# Patient Record
Sex: MALE | NOT HISPANIC OR LATINO
[De-identification: names, ages, dates, MRNs, and addresses within clinical notes are randomized per-mention and may not be internally consistent; named-entity substitution may affect disease eponyms.]

---

## 2017-01-03 ENCOUNTER — CHARTING TRANS (OUTPATIENT)
Dept: OTHER | Age: 37
End: 2017-01-03

## 2017-01-05 ENCOUNTER — CHARTING TRANS (OUTPATIENT)
Dept: OTHER | Age: 37
End: 2017-01-05

## 2017-01-06 ENCOUNTER — CHARTING TRANS (OUTPATIENT)
Dept: OTHER | Age: 37
End: 2017-01-06

## 2017-01-09 ENCOUNTER — CHARTING TRANS (OUTPATIENT)
Dept: OTHER | Age: 37
End: 2017-01-09

## 2017-01-10 ENCOUNTER — CHARTING TRANS (OUTPATIENT)
Dept: OTHER | Age: 37
End: 2017-01-10

## 2017-01-13 ENCOUNTER — CHARTING TRANS (OUTPATIENT)
Dept: OTHER | Age: 37
End: 2017-01-13

## 2017-01-16 ENCOUNTER — CHARTING TRANS (OUTPATIENT)
Dept: OTHER | Age: 37
End: 2017-01-16

## 2017-01-23 ENCOUNTER — CHARTING TRANS (OUTPATIENT)
Dept: OTHER | Age: 37
End: 2017-01-23

## 2017-01-24 ENCOUNTER — CHARTING TRANS (OUTPATIENT)
Dept: OTHER | Age: 37
End: 2017-01-24

## 2017-01-26 ENCOUNTER — CHARTING TRANS (OUTPATIENT)
Dept: OTHER | Age: 37
End: 2017-01-26

## 2017-01-30 ENCOUNTER — CHARTING TRANS (OUTPATIENT)
Dept: OTHER | Age: 37
End: 2017-01-30

## 2017-02-01 ENCOUNTER — CHARTING TRANS (OUTPATIENT)
Dept: OTHER | Age: 37
End: 2017-02-01

## 2017-02-06 ENCOUNTER — CHARTING TRANS (OUTPATIENT)
Dept: OTHER | Age: 37
End: 2017-02-06

## 2017-02-09 ENCOUNTER — CHARTING TRANS (OUTPATIENT)
Dept: OTHER | Age: 37
End: 2017-02-09

## 2017-02-13 ENCOUNTER — CHARTING TRANS (OUTPATIENT)
Dept: OTHER | Age: 37
End: 2017-02-13

## 2017-02-14 ENCOUNTER — CHARTING TRANS (OUTPATIENT)
Dept: ORTHOPEDICS | Age: 37
End: 2017-02-14

## 2017-02-14 ENCOUNTER — MYAURORA ACCOUNT LINK (OUTPATIENT)
Dept: OTHER | Age: 37
End: 2017-02-14

## 2017-03-06 ENCOUNTER — CHARTING TRANS (OUTPATIENT)
Dept: OTHER | Age: 37
End: 2017-03-06

## 2017-05-03 ENCOUNTER — OFFICE VISIT (OUTPATIENT)
Dept: FAMILY MEDICINE CLINIC | Facility: CLINIC | Age: 37
End: 2017-05-03

## 2017-05-03 VITALS
SYSTOLIC BLOOD PRESSURE: 118 MMHG | RESPIRATION RATE: 22 BRPM | OXYGEN SATURATION: 96 % | HEART RATE: 98 BPM | DIASTOLIC BLOOD PRESSURE: 78 MMHG

## 2017-05-03 DIAGNOSIS — Z02.9 ENCOUNTERS FOR ADMINISTRATIVE PURPOSES: Primary | ICD-10-CM

## 2017-05-03 PROCEDURE — 99213 OFFICE O/P EST LOW 20 MIN: CPT | Performed by: PHYSICIAN ASSISTANT

## 2017-05-03 NOTE — PROGRESS NOTES
S:  Pt immediately triaged back. Pt reports he was diagnosed with pericarditis and had an angioplasty last Thursday. Pt stayed 1 night in the hospital at [facility] and then was discharged home.  Pt reports his chest pain has improved and is now rated 6/10

## 2017-05-12 ENCOUNTER — CHARTING TRANS (OUTPATIENT)
Dept: OTHER | Age: 37
End: 2017-05-12

## 2017-07-10 ENCOUNTER — OFFICE VISIT (OUTPATIENT)
Dept: FAMILY MEDICINE CLINIC | Facility: CLINIC | Age: 37
End: 2017-07-10

## 2017-07-10 VITALS
BODY MASS INDEX: 30 KG/M2 | RESPIRATION RATE: 18 BRPM | OXYGEN SATURATION: 97 % | HEART RATE: 79 BPM | DIASTOLIC BLOOD PRESSURE: 70 MMHG | WEIGHT: 236 LBS | SYSTOLIC BLOOD PRESSURE: 138 MMHG | TEMPERATURE: 99 F

## 2017-07-10 DIAGNOSIS — J20.9 BRONCHITIS, ACUTE, WITH BRONCHOSPASM: Primary | ICD-10-CM

## 2017-07-10 PROCEDURE — 99213 OFFICE O/P EST LOW 20 MIN: CPT | Performed by: FAMILY MEDICINE

## 2017-07-10 RX ORDER — METOPROLOL SUCCINATE 25 MG/1
50 TABLET, EXTENDED RELEASE ORAL 2 TIMES DAILY
COMMUNITY
End: 2017-09-08

## 2017-07-10 RX ORDER — FLUOXETINE HYDROCHLORIDE 20 MG/1
20 CAPSULE ORAL DAILY
COMMUNITY
End: 2017-09-08 | Stop reason: DRUGHIGH

## 2017-07-10 RX ORDER — AZITHROMYCIN 500 MG/1
500 TABLET, FILM COATED ORAL DAILY
Qty: 7 TABLET | Refills: 0 | Status: SHIPPED | OUTPATIENT
Start: 2017-07-10 | End: 2017-07-17

## 2017-07-10 RX ORDER — LAMOTRIGINE 150 MG/1
150 TABLET ORAL DAILY
COMMUNITY
End: 2017-09-08

## 2017-07-10 NOTE — PROGRESS NOTES
HPI:   Neela Ceja is a 42year old male who presents for upper respiratory symptoms for  1  weeks.  Patient reports sore throat, congestion, clear and yellow colored nasal discharge, low grade fever, ear pain, OTC cold meds have not been helping, prior h Hypertension Paternal Grandmother    • Hypertension Paternal Grandfather    • Cancer Sister      uterine and ovarian   • Hypertension Sister    • Cancer Maternal Uncle      lung   • Cancer Paternal Aunt      throat   • Anxiety Maternal Aunt    • Depression Dispense: 7 tablet; Refill: 0    The patient indicates understanding of these issues and agrees to the plan. The patient is asked to return in 2 days, sooner if worsening symptoms. ER if acute respiratory distress.

## 2017-07-14 ENCOUNTER — APPOINTMENT (OUTPATIENT)
Dept: GENERAL RADIOLOGY | Age: 37
End: 2017-07-14
Attending: EMERGENCY MEDICINE
Payer: COMMERCIAL

## 2017-07-14 ENCOUNTER — OFFICE VISIT (OUTPATIENT)
Dept: FAMILY MEDICINE CLINIC | Facility: CLINIC | Age: 37
End: 2017-07-14

## 2017-07-14 ENCOUNTER — HOSPITAL ENCOUNTER (OUTPATIENT)
Age: 37
Discharge: HOME | End: 2017-07-14
Attending: EMERGENCY MEDICINE
Payer: COMMERCIAL

## 2017-07-14 VITALS
TEMPERATURE: 98 F | WEIGHT: 257 LBS | OXYGEN SATURATION: 98 % | SYSTOLIC BLOOD PRESSURE: 149 MMHG | RESPIRATION RATE: 18 BRPM | DIASTOLIC BLOOD PRESSURE: 95 MMHG | BODY MASS INDEX: 33 KG/M2 | HEART RATE: 67 BPM

## 2017-07-14 VITALS
HEART RATE: 70 BPM | OXYGEN SATURATION: 98 % | RESPIRATION RATE: 18 BRPM | SYSTOLIC BLOOD PRESSURE: 126 MMHG | TEMPERATURE: 99 F | BODY MASS INDEX: 33 KG/M2 | DIASTOLIC BLOOD PRESSURE: 86 MMHG | WEIGHT: 257 LBS

## 2017-07-14 DIAGNOSIS — J98.01 ACUTE BRONCHOSPASM: Primary | ICD-10-CM

## 2017-07-14 DIAGNOSIS — Z02.9 ENCOUNTER FOR ADMINISTRATIVE EXAMINATIONS: Primary | ICD-10-CM

## 2017-07-14 LAB
ATRIAL RATE: 64 BPM
P AXIS: 43 DEGREES
P-R INTERVAL: 194 MS
Q-T INTERVAL: 404 MS
QRS DURATION: 96 MS
QTC CALCULATION (BEZET): 416 MS
R AXIS: 28 DEGREES
T AXIS: 69 DEGREES
VENTRICULAR RATE: 64 BPM

## 2017-07-14 PROCEDURE — 93010 ELECTROCARDIOGRAM REPORT: CPT

## 2017-07-14 PROCEDURE — 94640 AIRWAY INHALATION TREATMENT: CPT

## 2017-07-14 PROCEDURE — 93010 ELECTROCARDIOGRAM REPORT: CPT | Performed by: INTERNAL MEDICINE

## 2017-07-14 PROCEDURE — 99214 OFFICE O/P EST MOD 30 MIN: CPT

## 2017-07-14 PROCEDURE — 71020 XR CHEST PA + LAT CHEST (CPT=71020): CPT | Performed by: EMERGENCY MEDICINE

## 2017-07-14 PROCEDURE — 93005 ELECTROCARDIOGRAM TRACING: CPT

## 2017-07-14 RX ORDER — PREDNISONE 20 MG/1
60 TABLET ORAL ONCE
Status: COMPLETED | OUTPATIENT
Start: 2017-07-14 | End: 2017-07-14

## 2017-07-14 RX ORDER — IPRATROPIUM BROMIDE AND ALBUTEROL SULFATE 2.5; .5 MG/3ML; MG/3ML
3 SOLUTION RESPIRATORY (INHALATION) ONCE
Status: COMPLETED | OUTPATIENT
Start: 2017-07-14 | End: 2017-07-14

## 2017-07-14 RX ORDER — PREDNISONE 20 MG/1
60 TABLET ORAL DAILY
Status: DISCONTINUED | OUTPATIENT
Start: 2017-07-14 | End: 2017-07-14

## 2017-07-14 RX ORDER — ALBUTEROL SULFATE 90 UG/1
2 AEROSOL, METERED RESPIRATORY (INHALATION) EVERY 6 HOURS PRN
Qty: 3.7 G | Refills: 0 | Status: SHIPPED | OUTPATIENT
Start: 2017-07-14 | End: 2017-09-08

## 2017-07-14 RX ORDER — AMLODIPINE BESYLATE 5 MG/1
5 TABLET ORAL 2 TIMES DAILY
Qty: 60 TABLET | Refills: 0 | Status: SHIPPED | OUTPATIENT
Start: 2017-07-14 | End: 2017-08-02

## 2017-07-14 RX ORDER — METOPROLOL TARTRATE 50 MG/1
50 TABLET, FILM COATED ORAL 2 TIMES DAILY
Qty: 60 TABLET | Refills: 0 | Status: SHIPPED | OUTPATIENT
Start: 2017-07-14 | End: 2017-08-13

## 2017-07-14 RX ORDER — BUDESONIDE AND FORMOTEROL FUMARATE DIHYDRATE 160; 4.5 UG/1; UG/1
AEROSOL RESPIRATORY (INHALATION)
Refills: 4 | COMMUNITY
Start: 2017-07-06 | End: 2019-06-10

## 2017-07-14 RX ORDER — PREDNISONE 20 MG/1
60 TABLET ORAL DAILY
Qty: 15 TABLET | Refills: 0 | Status: SHIPPED | OUTPATIENT
Start: 2017-07-14 | End: 2017-07-19

## 2017-07-14 NOTE — ED INITIAL ASSESSMENT (HPI)
Pt c/o SOB, cough, congestin x 1 week and progressively getting worse,pt reports hard to breathe on laying down. Pt reports clear to yellowish phlegm on coughing. Denies fever but reports chills and sweats.  Pt reports some pain in upper back but denies [employer]

## 2017-07-14 NOTE — ED PROVIDER NOTES
Patient Seen in: [facility]    History   Patient presents with:  Dyspnea LIZBETH SOB (respiratory)  Cough/URI    Stated Complaint: SOB x4 days    HPI    This is a 38-year-old male who presents with cold, cough, congestion that mouth.   FLUoxetine HCl 20 MG Oral Cap,  Take 20 mg by mouth daily. lamoTRIgine (LAMICTAL) 150 MG Oral Tab,  Take 150 mg by mouth daily. azithromycin 500 MG Oral Tab,  Take 1 tablet (500 mg total) by mouth daily.    AmLODIPine Besylate (NORVASC) 5 MG Or air)    Current:/81   Pulse 68   Temp 98.1 °F (36.7 °C) (Oral)   Resp 18   Wt 116.6 kg   SpO2 96%   BMI 32.98 kg/m²         Physical Exam    General: The patient is in no respiratory distress. HEENT: The patient has findings of a viral syndrome. Clinical Impression:  Acute bronchospasm  (primary encounter diagnosis)    Disposition:  There is no disposition on file for this visit.     Follow-up:  Marilyn Mckay, [address]  [phone]            Medications Prescr

## 2017-07-14 NOTE — PROGRESS NOTES
Patient presents to walk in clinic with complaint of shortness of breath and wheezing despite treatment. He was seen in the [facility] on 7/10/17 and given Zithromax.  He is currently taking Symbicort and using an Albuterol inhaler for rescue, last dose about 4 ho

## 2017-08-02 ENCOUNTER — OFFICE VISIT (OUTPATIENT)
Dept: FAMILY MEDICINE CLINIC | Facility: CLINIC | Age: 37
End: 2017-08-02

## 2017-08-02 VITALS
DIASTOLIC BLOOD PRESSURE: 84 MMHG | SYSTOLIC BLOOD PRESSURE: 132 MMHG | TEMPERATURE: 99 F | HEART RATE: 64 BPM | OXYGEN SATURATION: 98 % | BODY MASS INDEX: 33 KG/M2 | RESPIRATION RATE: 16 BRPM | WEIGHT: 257 LBS

## 2017-08-02 DIAGNOSIS — J01.40 ACUTE PANSINUSITIS, RECURRENCE NOT SPECIFIED: Primary | ICD-10-CM

## 2017-08-02 DIAGNOSIS — H65.193 ACUTE MEE (MIDDLE EAR EFFUSION), BILATERAL: ICD-10-CM

## 2017-08-02 DIAGNOSIS — F17.200 TOBACCO USE DISORDER: ICD-10-CM

## 2017-08-02 PROCEDURE — 99213 OFFICE O/P EST LOW 20 MIN: CPT | Performed by: NURSE PRACTITIONER

## 2017-08-02 RX ORDER — AMOXICILLIN AND CLAVULANATE POTASSIUM 875; 125 MG/1; MG/1
1 TABLET, FILM COATED ORAL 2 TIMES DAILY
Qty: 20 TABLET | Refills: 0 | Status: SHIPPED | OUTPATIENT
Start: 2017-08-02 | End: 2017-08-12

## 2017-08-02 RX ORDER — FLUTICASONE PROPIONATE 50 MCG
2 SPRAY, SUSPENSION (ML) NASAL DAILY
Qty: 1 BOTTLE | Refills: 0 | Status: SHIPPED | OUTPATIENT
Start: 2017-08-02 | End: 2017-09-08

## 2017-08-02 NOTE — PATIENT INSTRUCTIONS
Acute Sinusitis    Acute sinusitis is irritation and swelling of the sinuses. It is usually caused by a viral infection after a common cold. Your doctor can help you find relief. What is acute sinusitis?   Sinuses are air-filled spaces in the skull behin © 8718-7950 [address], [address] [place]. All rights reserved. This information is not intended as a substitute for professional medical care. Always follow your healthcare professional's instructions.

## 2017-08-02 NOTE — PROGRESS NOTES
CHIEF COMPLAINT:   Patient presents with:  Nasal Congestion: Body aches, neck and back, Ear pain. HPI:   Nicole Horton is a 42year old male who presents for upper respiratory symptoms for  5 days.  Patient reports sore throat, congestion, clear and Metoprolol Succinate ER 25 MG Oral Tablet 24 Hr Take 50 mg by mouth 2 (two) times daily.    Disp:  Rfl:       Past Medical History:   Diagnosis Date   • Bipolar 1 disorder ([facility] [address]    • Extrinsic asthma, unspecified    • Unspecified essential hypertension ASSESSMENT: Acute pansinusitis, recurrence not specified  (primary encounter diagnosis)  Tobacco use disorder  Acute mo (middle ear effusion), bilateral    PLAN: Meds as below.   Comfort care as described in Patient Instructions    Follow up with PCP next Cornell Monge will ask about your symptoms and health history. He or she will look at your ear, nose, and throat. You usually won't need to have X-rays taken.    The doctor may take a sample of mucus to check for bacteria.  If you have sinusitis that keeps co

## 2017-09-08 PROBLEM — I10 ESSENTIAL HYPERTENSION: Status: ACTIVE | Noted: 2017-09-08

## 2017-09-08 PROBLEM — J32.9 CHRONIC SINUSITIS, UNSPECIFIED LOCATION: Status: ACTIVE | Noted: 2017-09-08

## 2017-09-08 PROBLEM — F98.8 ATTENTION DEFICIT DISORDER (ADD) WITHOUT HYPERACTIVITY: Status: ACTIVE | Noted: 2017-09-08

## 2017-09-08 PROBLEM — F31.9 BIPOLAR 1 DISORDER (HCC): Status: ACTIVE | Noted: 2017-09-08

## 2017-09-08 PROBLEM — J45.30 MILD PERSISTENT ASTHMA WITHOUT COMPLICATION (HCC): Status: ACTIVE | Noted: 2017-09-08

## 2017-09-08 PROBLEM — J45.30 MILD PERSISTENT ASTHMA WITHOUT COMPLICATION: Status: ACTIVE | Noted: 2017-09-08

## 2017-09-08 PROBLEM — J30.89 CHRONIC NON-SEASONAL ALLERGIC RHINITIS, UNSPECIFIED TRIGGER: Status: ACTIVE | Noted: 2017-09-08

## 2017-10-31 ENCOUNTER — APPOINTMENT (OUTPATIENT)
Dept: OCCUPATIONAL MEDICINE | Age: 37
End: 2017-10-31
Attending: FAMILY MEDICINE

## 2018-01-04 ENCOUNTER — APPOINTMENT (OUTPATIENT)
Dept: CT IMAGING | Age: 38
End: 2018-01-04
Attending: EMERGENCY MEDICINE

## 2018-01-04 ENCOUNTER — OFFICE VISIT (OUTPATIENT)
Dept: FAMILY MEDICINE CLINIC | Facility: CLINIC | Age: 38
End: 2018-01-04

## 2018-01-04 ENCOUNTER — HOSPITAL ENCOUNTER (EMERGENCY)
Age: 38
Discharge: HOME | End: 2018-01-04
Attending: EMERGENCY MEDICINE

## 2018-01-04 VITALS
BODY MASS INDEX: 35.42 KG/M2 | HEIGHT: 74 IN | HEART RATE: 78 BPM | SYSTOLIC BLOOD PRESSURE: 156 MMHG | DIASTOLIC BLOOD PRESSURE: 106 MMHG | RESPIRATION RATE: 16 BRPM | WEIGHT: 276 LBS | OXYGEN SATURATION: 95 % | TEMPERATURE: 98 F

## 2018-01-04 VITALS
BODY MASS INDEX: 35 KG/M2 | SYSTOLIC BLOOD PRESSURE: 180 MMHG | HEART RATE: 91 BPM | DIASTOLIC BLOOD PRESSURE: 100 MMHG | OXYGEN SATURATION: 96 % | WEIGHT: 276 LBS | TEMPERATURE: 99 F | RESPIRATION RATE: 22 BRPM

## 2018-01-04 DIAGNOSIS — N17.9 AKI (ACUTE KIDNEY INJURY) (HCC): ICD-10-CM

## 2018-01-04 DIAGNOSIS — I10 HYPERTENSION, UNSPECIFIED TYPE: ICD-10-CM

## 2018-01-04 DIAGNOSIS — R51.9 RECURRENT HEADACHE: Primary | ICD-10-CM

## 2018-01-04 DIAGNOSIS — Z02.9 ADMINISTRATIVE ENCOUNTER: Primary | ICD-10-CM

## 2018-01-04 LAB
ATRIAL RATE: 79 BPM
BASOPHILS # BLD AUTO: 0.12 X10(3) UL (ref 0–0.1)
BASOPHILS NFR BLD AUTO: 1.3 %
BUN BLD-MCNC: 15 MG/DL (ref 8–20)
CALCIUM BLD-MCNC: 8.6 MG/DL (ref 8.3–10.3)
CHLORIDE: 107 MMOL/L (ref 101–111)
CO2: 24 MMOL/L (ref 22–32)
CREAT BLD-MCNC: 1.67 MG/DL (ref 0.7–1.3)
EOSINOPHIL # BLD AUTO: 0.2 X10(3) UL (ref 0–0.3)
EOSINOPHIL NFR BLD AUTO: 2.1 %
ERYTHROCYTE [DISTWIDTH] IN BLOOD BY AUTOMATED COUNT: 11.9 % (ref 11.5–16)
GLUCOSE BLD-MCNC: 120 MG/DL (ref 70–99)
HCT VFR BLD AUTO: 45.8 % (ref 37–53)
HGB BLD-MCNC: 16.5 G/DL (ref 13–17)
IMMATURE GRANULOCYTE COUNT: 0.05 X10(3) UL (ref 0–1)
IMMATURE GRANULOCYTE RATIO %: 0.5 %
LYMPHOCYTES # BLD AUTO: 2.82 X10(3) UL (ref 0.9–4)
LYMPHOCYTES NFR BLD AUTO: 29.7 %
MCH RBC QN AUTO: 31.7 PG (ref 27–33.2)
MCHC RBC AUTO-ENTMCNC: 36 G/DL (ref 31–37)
MCV RBC AUTO: 87.9 FL (ref 80–99)
MONOCYTES # BLD AUTO: 0.83 X10(3) UL (ref 0.1–0.6)
MONOCYTES NFR BLD AUTO: 8.8 %
NEUTROPHIL ABS PRELIM: 5.46 X10 (3) UL (ref 1.3–6.7)
NEUTROPHILS # BLD AUTO: 5.46 X10(3) UL (ref 1.3–6.7)
NEUTROPHILS NFR BLD AUTO: 57.6 %
P AXIS: 43 DEGREES
P-R INTERVAL: 190 MS
PLATELET # BLD AUTO: 264 10(3)UL (ref 150–450)
POTASSIUM SERPL-SCNC: 3.4 MMOL/L (ref 3.6–5.1)
Q-T INTERVAL: 384 MS
QRS DURATION: 94 MS
QTC CALCULATION (BEZET): 440 MS
R AXIS: 26 DEGREES
RBC # BLD AUTO: 5.21 X10(6)UL (ref 4.3–5.7)
RED CELL DISTRIBUTION WIDTH-SD: 38.2 FL (ref 35.1–46.3)
SODIUM SERPL-SCNC: 139 MMOL/L (ref 136–144)
T AXIS: 70 DEGREES
VENTRICULAR RATE: 79 BPM
WBC # BLD AUTO: 9.5 X10(3) UL (ref 4–13)

## 2018-01-04 PROCEDURE — 96374 THER/PROPH/DIAG INJ IV PUSH: CPT

## 2018-01-04 PROCEDURE — 70450 CT HEAD/BRAIN W/O DYE: CPT | Performed by: EMERGENCY MEDICINE

## 2018-01-04 PROCEDURE — 93005 ELECTROCARDIOGRAM TRACING: CPT

## 2018-01-04 PROCEDURE — 93010 ELECTROCARDIOGRAM REPORT: CPT

## 2018-01-04 PROCEDURE — 85025 COMPLETE CBC W/AUTO DIFF WBC: CPT | Performed by: EMERGENCY MEDICINE

## 2018-01-04 PROCEDURE — 80048 BASIC METABOLIC PNL TOTAL CA: CPT | Performed by: EMERGENCY MEDICINE

## 2018-01-04 PROCEDURE — 96375 TX/PRO/DX INJ NEW DRUG ADDON: CPT

## 2018-01-04 PROCEDURE — 99285 EMERGENCY DEPT VISIT HI MDM: CPT

## 2018-01-04 RX ORDER — METOCLOPRAMIDE HYDROCHLORIDE 5 MG/ML
10 INJECTION INTRAMUSCULAR; INTRAVENOUS ONCE
Status: COMPLETED | OUTPATIENT
Start: 2018-01-04 | End: 2018-01-04

## 2018-01-04 RX ORDER — RISPERIDONE 2 MG/1
2 TABLET, FILM COATED ORAL NIGHTLY
Qty: 30 TABLET | Refills: 0 | Status: SHIPPED | OUTPATIENT
Start: 2018-01-04 | End: 2018-02-03

## 2018-01-04 RX ORDER — DIPHENHYDRAMINE HYDROCHLORIDE 50 MG/ML
25 INJECTION INTRAMUSCULAR; INTRAVENOUS ONCE
Status: COMPLETED | OUTPATIENT
Start: 2018-01-04 | End: 2018-01-04

## 2018-01-04 RX ORDER — LAMOTRIGINE 150 MG/1
150 TABLET ORAL 2 TIMES DAILY
Qty: 60 TABLET | Refills: 0 | Status: SHIPPED | OUTPATIENT
Start: 2018-01-04 | End: 2018-02-03

## 2018-01-04 RX ORDER — FLUOXETINE 20 MG/1
40 TABLET, FILM COATED ORAL DAILY
Qty: 60 TABLET | Refills: 0 | Status: SHIPPED | OUTPATIENT
Start: 2018-01-04 | End: 2018-02-03

## 2018-01-04 NOTE — ED INITIAL ASSESSMENT (HPI)
PT STATES HE HAS HAD SIMILAR SYMPTOMS IN THE PAST WHE HIS BLOOD PRESSURE IS ELEVATED. STATES HE HAS BEEN TAKING ALL MEDS FOR HTN AS DIRECTED BUT IS OUT OF HIS ADDERALL, LAMICTAL, FLUOXETINE AND RISPERIDONE.

## 2018-01-04 NOTE — ED PROVIDER NOTES
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 Head: Normocephalic and atraumatic. Nose: Nose normal.   Eyes: EOM are normal. Pupils are equal, round, and reactive to light. Neck: Normal range of motion. Neck supple. No JVD present.    Cardiovascular: Nor ---------                               -----------         ------                     CBC W/ DIFFERENTIAL[[id number]]          Abnormal            Final result                 Please view results for these tests on the individual orders. unremarkable. Visualized portions of mastoid air cells are unremarkable. Visualized portions of orbits are unremarkable. IMPRESSION: Unremarkable CT head.     Dictated by: Bhupendra Donovan MD on 1/04/2018 at 15:59     Approved by: Bhupendra Donovan MD

## 2018-01-04 NOTE — ED INITIAL ASSESSMENT (HPI)
FOR THE PAST 2 DAYS PT C/O HEADACHE, DIZZINESS AND BLURRED VISION. PT SENT FROM [facility] FOR ELEVATED BLOOD PRESSURE.  DENIES CP/SOB

## 2018-01-04 NOTE — ED NOTES
Pt awake and alert, states feels \"much better\" states blurred vision and dizziness have resolved.  Rates pain 3/10

## 2018-01-04 NOTE — PROGRESS NOTES
Fareed Penn is a 42year old male who presents to [facility] with c/o dizziness, headache, blurry vision. Accompanied by: self  After triage, higher acuity of care was recommended to Fareed Penn today.    Rationale: Patient with elevated blood pressure, heada

## 2018-01-08 PROBLEM — N18.30 STAGE 3 CHRONIC KIDNEY DISEASE (HCC): Status: ACTIVE | Noted: 2018-01-08

## 2018-01-08 PROBLEM — R51.9 ACUTE NONINTRACTABLE HEADACHE, UNSPECIFIED HEADACHE TYPE: Status: ACTIVE | Noted: 2018-01-08

## 2018-01-08 PROBLEM — I10 UNCONTROLLED HYPERTENSION: Status: ACTIVE | Noted: 2018-01-08

## 2018-01-08 PROCEDURE — 81001 URINALYSIS AUTO W/SCOPE: CPT | Performed by: INTERNAL MEDICINE

## 2018-02-14 ENCOUNTER — HOSPITAL ENCOUNTER (OUTPATIENT)
Facility: HOSPITAL | Age: 38
Setting detail: OBSERVATION
Discharge: HOME | End: 2018-02-16
Attending: EMERGENCY MEDICINE | Admitting: INTERNAL MEDICINE
Payer: COMMERCIAL

## 2018-02-14 DIAGNOSIS — IMO0002 SELF-INFLICTED INJURY: Primary | ICD-10-CM

## 2018-02-14 DIAGNOSIS — F31.70 BIPOLAR DISORDER IN PARTIAL REMISSION, MOST RECENT EPISODE UNSPECIFIED TYPE (HCC): ICD-10-CM

## 2018-02-14 DIAGNOSIS — S09.93XA INJURY OF TONGUE, INITIAL ENCOUNTER: ICD-10-CM

## 2018-02-14 DIAGNOSIS — I10 HTN (HYPERTENSION): ICD-10-CM

## 2018-02-14 LAB
ALBUMIN SERPL-MCNC: 3.6 G/DL (ref 3.5–4.8)
ALP LIVER SERPL-CCNC: 141 U/L (ref 45–117)
ALT SERPL-CCNC: 56 U/L (ref 17–63)
AST SERPL-CCNC: 104 U/L (ref 15–41)
BASOPHILS # BLD AUTO: 0.07 X10(3) UL (ref 0–0.1)
BASOPHILS NFR BLD AUTO: 0.7 %
BILIRUB SERPL-MCNC: 0.6 MG/DL (ref 0.1–2)
BUN BLD-MCNC: 18 MG/DL (ref 8–20)
CALCIUM BLD-MCNC: 8.6 MG/DL (ref 8.3–10.3)
CHLORIDE: 109 MMOL/L (ref 101–111)
CO2: 26 MMOL/L (ref 22–32)
CREAT BLD-MCNC: 1.71 MG/DL (ref 0.7–1.3)
EOSINOPHIL # BLD AUTO: 0.11 X10(3) UL (ref 0–0.3)
EOSINOPHIL NFR BLD AUTO: 1.1 %
ERYTHROCYTE [DISTWIDTH] IN BLOOD BY AUTOMATED COUNT: 12.2 % (ref 11.5–16)
GLUCOSE BLD-MCNC: 90 MG/DL (ref 70–99)
HCT VFR BLD AUTO: 44.1 % (ref 37–53)
HGB BLD-MCNC: 15.7 G/DL (ref 13–17)
IMMATURE GRANULOCYTE COUNT: 0.01 X10(3) UL (ref 0–1)
IMMATURE GRANULOCYTE RATIO %: 0.1 %
LYMPHOCYTES # BLD AUTO: 2.7 X10(3) UL (ref 0.9–4)
LYMPHOCYTES NFR BLD AUTO: 28 %
M PROTEIN MFR SERPL ELPH: 7.3 G/DL (ref 6.1–8.3)
MCH RBC QN AUTO: 31 PG (ref 27–33.2)
MCHC RBC AUTO-ENTMCNC: 35.6 G/DL (ref 31–37)
MCV RBC AUTO: 87 FL (ref 80–99)
MONOCYTES # BLD AUTO: 1.04 X10(3) UL (ref 0.1–1)
MONOCYTES NFR BLD AUTO: 10.8 %
NEUTROPHIL ABS PRELIM: 5.72 X10 (3) UL (ref 1.3–6.7)
NEUTROPHILS # BLD AUTO: 5.72 X10(3) UL (ref 1.3–6.7)
NEUTROPHILS NFR BLD AUTO: 59.3 %
PLATELET # BLD AUTO: 246 10(3)UL (ref 150–450)
POTASSIUM SERPL-SCNC: 3.6 MMOL/L (ref 3.6–5.1)
RBC # BLD AUTO: 5.07 X10(6)UL (ref 4.3–5.7)
RED CELL DISTRIBUTION WIDTH-SD: 38.7 FL (ref 35.1–46.3)
SODIUM SERPL-SCNC: 142 MMOL/L (ref 136–144)
WBC # BLD AUTO: 9.7 X10(3) UL (ref 4–13)

## 2018-02-14 RX ORDER — LORAZEPAM 2 MG/ML
2 INJECTION INTRAMUSCULAR ONCE
Status: COMPLETED | OUTPATIENT
Start: 2018-02-14 | End: 2018-02-14

## 2018-02-14 RX ORDER — LAMOTRIGINE 100 MG/1
150 TABLET ORAL DAILY
Status: DISCONTINUED | OUTPATIENT
Start: 2018-02-14 | End: 2018-02-15

## 2018-02-14 RX ORDER — AMLODIPINE BESYLATE 5 MG/1
10 TABLET ORAL DAILY
Status: DISCONTINUED | OUTPATIENT
Start: 2018-02-15 | End: 2018-02-16

## 2018-02-14 RX ORDER — SODIUM CHLORIDE 9 MG/ML
INJECTION, SOLUTION INTRAVENOUS CONTINUOUS
Status: DISCONTINUED | OUTPATIENT
Start: 2018-02-14 | End: 2018-02-16

## 2018-02-14 RX ORDER — DEXTROAMPHETAMINE SACCHARATE, AMPHETAMINE ASPARTATE MONOHYDRATE, DEXTROAMPHETAMINE SULFATE AND AMPHETAMINE SULFATE 7.5; 7.5; 7.5; 7.5 MG/1; MG/1; MG/1; MG/1
30 CAPSULE, EXTENDED RELEASE ORAL EVERY MORNING
Status: DISCONTINUED | OUTPATIENT
Start: 2018-02-15 | End: 2018-02-14 | Stop reason: SDUPTHER

## 2018-02-14 RX ORDER — FLUOXETINE HYDROCHLORIDE 20 MG/1
40 CAPSULE ORAL DAILY
Status: DISCONTINUED | OUTPATIENT
Start: 2018-02-14 | End: 2018-02-15

## 2018-02-14 RX ORDER — ONDANSETRON 2 MG/ML
4 INJECTION INTRAMUSCULAR; INTRAVENOUS EVERY 6 HOURS PRN
Status: DISCONTINUED | OUTPATIENT
Start: 2018-02-14 | End: 2018-02-16

## 2018-02-14 RX ORDER — METOPROLOL SUCCINATE 50 MG/1
100 TABLET, EXTENDED RELEASE ORAL
Status: DISCONTINUED | OUTPATIENT
Start: 2018-02-15 | End: 2018-02-16

## 2018-02-14 RX ORDER — DOCUSATE SODIUM 100 MG/1
100 CAPSULE, LIQUID FILLED ORAL 2 TIMES DAILY
Status: DISCONTINUED | OUTPATIENT
Start: 2018-02-14 | End: 2018-02-16

## 2018-02-14 RX ORDER — POLYETHYLENE GLYCOL 3350 17 G/17G
17 POWDER, FOR SOLUTION ORAL DAILY PRN
Status: DISCONTINUED | OUTPATIENT
Start: 2018-02-14 | End: 2018-02-16

## 2018-02-14 RX ORDER — BISACODYL 10 MG
10 SUPPOSITORY, RECTAL RECTAL
Status: DISCONTINUED | OUTPATIENT
Start: 2018-02-14 | End: 2018-02-16

## 2018-02-14 RX ORDER — ACETAMINOPHEN 325 MG/1
650 TABLET ORAL EVERY 6 HOURS PRN
Status: DISCONTINUED | OUTPATIENT
Start: 2018-02-14 | End: 2018-02-16

## 2018-02-14 RX ORDER — FLUTICASONE PROPIONATE 50 MCG
2 SPRAY, SUSPENSION (ML) NASAL DAILY
Status: DISCONTINUED | OUTPATIENT
Start: 2018-02-14 | End: 2018-02-14

## 2018-02-14 RX ORDER — SODIUM PHOSPHATE, DIBASIC AND SODIUM PHOSPHATE, MONOBASIC 7; 19 G/133ML; G/133ML
1 ENEMA RECTAL ONCE AS NEEDED
Status: DISCONTINUED | OUTPATIENT
Start: 2018-02-14 | End: 2018-02-16

## 2018-02-14 RX ORDER — ALBUTEROL SULFATE 90 UG/1
2 AEROSOL, METERED RESPIRATORY (INHALATION) EVERY 6 HOURS PRN
Status: DISCONTINUED | OUTPATIENT
Start: 2018-02-14 | End: 2018-02-16

## 2018-02-14 RX ORDER — HALOPERIDOL 5 MG/ML
10 INJECTION INTRAMUSCULAR ONCE
Status: COMPLETED | OUTPATIENT
Start: 2018-02-14 | End: 2018-02-14

## 2018-02-14 NOTE — ED INITIAL ASSESSMENT (HPI)
Has bipolar. Has not been taking medication due to lack of insurance.   Pt states he has a hx of \"black outs\"  States his mom found him trying to cut off his tongue-- nail clippers, scissors and safety pins were found by him around 0400 yesterday morning-

## 2018-02-14 NOTE — ED PROVIDER NOTES
Patient Seen in: [facility] In [place]    History   Patient presents with:  Laceration Abrasion (integumentary)    Stated Complaint: tongue laceration    HPI    25-year-old male, medical history as noted below, here for evaluation of Ht 188 cm (6' 2\")   Wt 122.5 kg   SpO2 98%   BMI 34.67 kg/m²         Physical Exam    Constitutional: Pt is oriented to person, place, and time. Appears well-developed and well-nourished. Head: Normocephalic and atraumatic.    Nose: Nose normal.     Marina RAINBOW DRAW LAVENDER   RAINBOW DRAW LIGHT GREEN   RAINBOW DRAW GOLD       ED Course as of Feb 14 1808  ------------------------------------------------------------       MDM       Since the patient has harm himself significantly, I do not think he is sa Unknown

## 2018-02-15 LAB
BARBITURATES URINE: NEGATIVE
BASOPHILS # BLD AUTO: 0.07 X10(3) UL (ref 0–0.1)
BASOPHILS NFR BLD AUTO: 1.1 %
BUN BLD-MCNC: 16 MG/DL (ref 8–20)
CALCIUM BLD-MCNC: 9.1 MG/DL (ref 8.3–10.3)
CANNABINOID URINE: NEGATIVE
CHLORIDE: 110 MMOL/L (ref 101–111)
CO2: 26 MMOL/L (ref 22–32)
COCAINE URINE: NEGATIVE
CREAT BLD-MCNC: 1.53 MG/DL (ref 0.7–1.3)
EOSINOPHIL # BLD AUTO: 0.18 X10(3) UL (ref 0–0.3)
EOSINOPHIL NFR BLD AUTO: 2.8 %
ERYTHROCYTE [DISTWIDTH] IN BLOOD BY AUTOMATED COUNT: 12 % (ref 11.5–16)
ETHYL ALCOHOL, QUALITATIVE: NEGATIVE
FREE T4: 1 NG/DL (ref 0.9–1.8)
GLUCOSE BLD-MCNC: 116 MG/DL (ref 70–99)
HCT VFR BLD AUTO: 41.2 % (ref 37–53)
HGB BLD-MCNC: 14.1 G/DL (ref 13–17)
IMMATURE GRANULOCYTE COUNT: 0.01 X10(3) UL (ref 0–1)
IMMATURE GRANULOCYTE RATIO %: 0.2 %
LYMPHOCYTES # BLD AUTO: 2.87 X10(3) UL (ref 0.9–4)
LYMPHOCYTES NFR BLD AUTO: 44.1 %
MCH RBC QN AUTO: 30.2 PG (ref 27–33.2)
MCHC RBC AUTO-ENTMCNC: 34.2 G/DL (ref 31–37)
MCV RBC AUTO: 88.2 FL (ref 80–99)
MONOCYTES # BLD AUTO: 0.65 X10(3) UL (ref 0.1–1)
MONOCYTES NFR BLD AUTO: 10 %
NEUTROPHIL ABS PRELIM: 2.73 X10 (3) UL (ref 1.3–6.7)
NEUTROPHILS # BLD AUTO: 2.73 X10(3) UL (ref 1.3–6.7)
NEUTROPHILS NFR BLD AUTO: 41.8 %
PCP URINE: NEGATIVE
PLATELET # BLD AUTO: 221 10(3)UL (ref 150–450)
POTASSIUM SERPL-SCNC: 3.3 MMOL/L (ref 3.6–5.1)
RBC # BLD AUTO: 4.67 X10(6)UL (ref 4.3–5.7)
RED CELL DISTRIBUTION WIDTH-SD: 38.7 FL (ref 35.1–46.3)
SODIUM SERPL-SCNC: 142 MMOL/L (ref 136–144)
TSI SER-ACNC: 0.29 MIU/ML (ref 0.35–5.5)
WBC # BLD AUTO: 6.5 X10(3) UL (ref 4–13)

## 2018-02-15 PROCEDURE — 90792 PSYCH DIAG EVAL W/MED SRVCS: CPT | Performed by: OTHER

## 2018-02-15 RX ORDER — LAMOTRIGINE 100 MG/1
150 TABLET ORAL 2 TIMES DAILY
Status: DISCONTINUED | OUTPATIENT
Start: 2018-02-15 | End: 2018-02-16

## 2018-02-15 RX ORDER — FLUOXETINE HYDROCHLORIDE 20 MG/1
80 CAPSULE ORAL DAILY
Status: DISCONTINUED | OUTPATIENT
Start: 2018-02-16 | End: 2018-02-16

## 2018-02-15 RX ORDER — RISPERIDONE 1 MG/1
2 TABLET, FILM COATED ORAL NIGHTLY
Status: DISCONTINUED | OUTPATIENT
Start: 2018-02-15 | End: 2018-02-16

## 2018-02-15 RX ORDER — HALOPERIDOL 5 MG/ML
1 INJECTION INTRAMUSCULAR EVERY 6 HOURS PRN
Status: DISCONTINUED | OUTPATIENT
Start: 2018-02-15 | End: 2018-02-16

## 2018-02-15 NOTE — CONSULTS
42year old M admitted for self-mutilation of his tongue. Denies significant pain or bleeding. H/o psychosis. No cough, no change in voice, no dysphagia, no odynophagia.     PMH: H/o psychosis, bipolar depression    EXAM: Alert, comfortable, NAD  OC: L mid

## 2018-02-15 NOTE — ED NOTES
Pt remains calm and cooperative. No distress. Ambulance at the bedside for transport to [facility] for admit. Report given and care transferred.

## 2018-02-15 NOTE — H&P
[facility] Hospitalist History and Physical      Patient presents with:  Laceration Abrasion (integumentary)       PCP: Yulisa Crespo      History of Present Illness: Patient is a 42year old male with PMH sig for bipolar 1 w psychosis, asthma, HTN presents for e except for what is stated in HPI.       OBJECTIVE:  /95 (BP Location: Right arm)   Pulse 84   Temp 98 °F (36.7 °C) (Oral)   Resp 18   Ht 188 cm (6' 2\")   Wt 264 lb 1.8 oz (119.8 kg)   SpO2 99%   BMI 33.91 kg/m²   General:  Alert, no distress, appears adjusted. - needs IP psych    pulysubtance use  - as above      Outpatient records or previous hospital records reviewed. [facility] hospitalist to continue to follow patient while in house  A total of 75  minutes taken with patient and coordinating care.   G

## 2018-02-15 NOTE — CONSULTS
[facility]  Report of Psychiatric Consultation    Miguel Webbtune Patient Status:  Observation    1980 MRN [id number]   [facility] Attending Kristyn Menezes MD   Hosp Day # 0 PCP Veleta Cabot     Date of Admission:  in transition and started working 3 wks ago at [employer]. Off his meds, she noticed that about 3 wks ago, he was hyperactive, talking more, and not sleeping at night. On 1/4, he went to the [facility] ED c/o headache. He was thought to have HTN induced HA.  He wa Father    • Alcohol and Other Disorders Associated Father    • Substance Abuse Father      cocaine   • Diabetes Mother    • Cancer Mother      multiple myeloma   • Hypertension Mother    • Anxiety Maternal Aunt    • Depression Maternal Aunt    • Anxiety Ma influenza vaccine split quad (FLULAVAL) ages 7 months to 72 years inj 0.5ml, 0.5 mL, Intramuscular, Prior to discharge  •  amphetamine-dextroamphetamine (ADDERALL) tab 15 mg, 15 mg, Oral, BID AC    Review of Systems   Constitutional: Positive for malaise/f

## 2018-02-15 NOTE — PLAN OF CARE
Pt alert and oriented, drowsy. Pt asleep for much of shift; easily arouseable. C/o pain in tongue, 3/10; declined pain meds. SLP eval; no complications with chewing/swallowing. VSS. Afebrile. IVF.   K replaced per protocol, redraw tomorrow AM.  Thomas Rodriges

## 2018-02-15 NOTE — PROGRESS NOTES
[facility] Progress Note:  Automatic Substitution    Dextroamphetamine and amphetamine (Adderall) ER 30mg daily was changed to dextroamphetamine and amphetamine (Adderall) IR 15mg daily  per the P&T approved auto-substitution

## 2018-02-15 NOTE — PROGRESS NOTES
Patient received from  ED at [address]. Belongings placed in locker 0008. Young placed in patient chart.

## 2018-02-15 NOTE — CM/SW NOTE
SW noted self pay/no insurance status and referral sent to [address] to verify if pt is eligible for Medicaid.  SW notified pt is over income limit and does not qualify for Medicaid at this time and notified pt's new insurance is effective 02/19/201

## 2018-02-15 NOTE — SLP NOTE
ADULT SWALLOWING EVALUATION    ASSESSMENT    ASSESSMENT/OVERALL IMPRESSION:    Order received for bedside swallow evaluation. Pt is an 41 y/o male admitted d/t self inflicted tongue laceration; hx of bipolar disorder.  Pt found sleeping but easily awakened injury  Active Problems:    Injury of tongue, initial encounter    Bipolar disorder in partial remission, most recent episode unspecified type [facility])      Past Medical History  Past Medical History:   Diagnosis Date   • Bipolar 1 disorder ([facility] [address]    • Extrins swallow strategies independently over 1 session(s).     New Goal   Goal #3 The patient will utilize compensatory strategies as outlined by  BSSE (clinical evaluation) including Slow rate, Small bites, Small sips, Alternate liquids/solids, Upright 90 degrees

## 2018-02-15 NOTE — PROGRESS NOTES
NURSING ADMISSION NOTE      Patient admitted via Cart  Oriented to room. Safety precautions initiated. Bed in low position.   Call light in reach.    --------------------------------------  Admission navigator completed  Patient is A&Ox4 - too drowsy

## 2018-02-15 NOTE — ED NOTES
Pt rests calmly on the cart at this time. Pt was moved to room #7 where he is being watching continuously via camera. Pt is cooperative. No distress. Awaiting bed assignment for admit.

## 2018-02-16 VITALS
WEIGHT: 264.13 LBS | TEMPERATURE: 98 F | DIASTOLIC BLOOD PRESSURE: 90 MMHG | RESPIRATION RATE: 18 BRPM | OXYGEN SATURATION: 94 % | HEART RATE: 75 BPM | BODY MASS INDEX: 33.9 KG/M2 | HEIGHT: 74 IN | SYSTOLIC BLOOD PRESSURE: 134 MMHG

## 2018-02-16 LAB
BUN BLD-MCNC: 12 MG/DL (ref 8–20)
CALCIUM BLD-MCNC: 8.3 MG/DL (ref 8.3–10.3)
CHLORIDE: 111 MMOL/L (ref 101–111)
CO2: 26 MMOL/L (ref 22–32)
CREAT BLD-MCNC: 1.41 MG/DL (ref 0.7–1.3)
GLUCOSE BLD-MCNC: 107 MG/DL (ref 70–99)
POTASSIUM SERPL-SCNC: 3.3 MMOL/L (ref 3.6–5.1)
POTASSIUM SERPL-SCNC: 3.3 MMOL/L (ref 3.6–5.1)
SODIUM SERPL-SCNC: 145 MMOL/L (ref 136–144)

## 2018-02-16 PROCEDURE — 99225 SUBSEQUENT OBSERVATION CARE: CPT | Performed by: OTHER

## 2018-02-16 RX ORDER — AMLODIPINE BESYLATE 5 MG/1
10 TABLET ORAL 2 TIMES DAILY
Qty: 30 TABLET | Refills: 1 | Status: SHIPPED | OUTPATIENT
Start: 2018-02-16 | End: 2018-04-20

## 2018-02-16 RX ORDER — DEXTROAMPHETAMINE SACCHARATE, AMPHETAMINE ASPARTATE, DEXTROAMPHETAMINE SULFATE AND AMPHETAMINE SULFATE 2.5; 2.5; 2.5; 2.5 MG/1; MG/1; MG/1; MG/1
30 TABLET ORAL DAILY
Status: DISCONTINUED | OUTPATIENT
Start: 2018-02-16 | End: 2018-02-16

## 2018-02-16 RX ORDER — RISPERIDONE 2 MG/1
2 TABLET, FILM COATED ORAL NIGHTLY
Qty: 30 TABLET | Refills: 0 | Status: SHIPPED | OUTPATIENT
Start: 2018-02-16 | End: 2018-08-09

## 2018-02-16 NOTE — DISCHARGE SUMMARY
General Medicine Discharge Summary     Patient ID:  Kamla López  42year old  4/12/1980    Admit date: 2/14/2018    Discharge date and time:2/16/2018  Attending Physician: Rad Sage MD total) by mouth daily. Amphetamine-Dextroamphet ER 30 MG Oral Capsule SR 24 Hr  Take 1 capsule (30 mg total) by mouth every morning.     amphetamine-dextroamphetamine 10 MG Oral Tab  TK 1 T PO QD    Albuterol Sulfate  (90 Base) MCG/ACT Inhalation

## 2018-02-16 NOTE — PLAN OF CARE
Pt. Is Ax4, calm, and cooperative. 1:1 sitter for psychosis  Pt. Very drowsy most of shift but would awaken to voice. Safety tray maintained. VSS, will continue to monitor.     ANXIETY    • Will report anxiety at manageable levels Progressing        BEHA

## 2018-02-16 NOTE — CM/SW NOTE
SEFERINO met with pt and provided coupons from [facility] for Prozac, Lamictal and Risperdal totaling around $30.00 for a month's supply for the 3 medications.  Pt reported he is aware of [facility] and the discount medication programs, pt apparently chose not utilize re

## 2018-02-16 NOTE — PROGRESS NOTES
[facility]  Report of Psychiatric Progress Note     Date of Admission: 2/14/18  Date of Service: 2/16/18  Reason for Consultation: Psychosis, self-mutilation     Impression: He had acute psychosis on 2/12/18 either due to hydrocodone abuse (took sever DC Sitter. He is oriented x 4, alert, and organized in thinking. No hallucinations, paranoid ideation, or suicidal ideation. He is eating/drinking and walking without difficulty. 2) Monitor this afternoon. Mother is coming after work to see him.  If he himself.       He is drowsy this AM, but able to tell me that he doesn't remember cutting his tongue. He denies hearing any voices or having visions or any suicidal ideation. He feels tired and depressed.  He admits to being off his psych meds for \"at leas • Bipolar Disorder Maternal Aunt     • Diabetes Maternal Grandmother     • Hypertension Maternal Grandmother     • Cancer Maternal Grandfather         stomach cancer   • Diabetes Maternal Grandfather     • Hypertension Maternal Grandfather     • Alcohol

## 2018-02-16 NOTE — BH PROGRESS NOTE
Went to see the pt after talking with Dr. Brandon Krause, the psychiatrist.  The pt was given the number to the [facility] for f/u with a psychiatrist.  He was informed this dept can assist him with medication/ a program to help him pay for his medicati

## 2018-02-16 NOTE — SLP NOTE
SPEECH DAILY NOTE - INPATIENT    ASSESSMENT & PLAN   ASSESSMENT  Pt seen for dysphagia tx to assess tolerance with recommended diet, ensure appropriate utilization of aspiration precautions and provide pt/family education.   Pt found sitting up at side of b 0    Session: 1    If you have any questions, please contact Elisabeth Chinchilla

## 2018-02-17 NOTE — PROGRESS NOTES
NURSING DISCHARGE NOTE    Discharged Home via Ambulatory. Accompanied by Family member  Belongings Taken by patient/family. Discharge paperwork and prescriptions reviewed with pt.  Verbalized understanding   All questions answered  Pt did not want t

## 2018-02-20 NOTE — CM/SW NOTE
Pt d/c 02/16 home with resources.        02/20/18 0900   Discharge disposition   Discharged to: Home or Self   Discharge transportation Private car

## 2018-02-27 PROBLEM — R79.89 CREATININE ELEVATION: Status: ACTIVE | Noted: 2018-02-27

## 2018-02-27 PROBLEM — R35.89 POLYURIA: Status: ACTIVE | Noted: 2018-02-27

## 2018-02-27 PROBLEM — Z80.42 FAMILY HISTORY OF PROSTATE CANCER: Status: ACTIVE | Noted: 2018-02-27

## 2018-02-27 PROBLEM — IMO0002 CREATININE ELEVATION: Status: ACTIVE | Noted: 2018-02-27

## 2018-04-20 PROBLEM — M54.50 ACUTE MIDLINE LOW BACK PAIN WITHOUT SCIATICA: Status: ACTIVE | Noted: 2018-04-20

## 2018-04-20 PROBLEM — R53.83 FATIGUE, UNSPECIFIED TYPE: Status: ACTIVE | Noted: 2018-04-20

## 2018-04-20 PROBLEM — J30.89 CHRONIC NON-SEASONAL ALLERGIC RHINITIS: Status: ACTIVE | Noted: 2017-09-08

## 2018-04-20 PROCEDURE — 81001 URINALYSIS AUTO W/SCOPE: CPT | Performed by: INTERNAL MEDICINE

## 2018-05-16 NOTE — LETTER
[facility]  [address] [address]  [phone]  AUTHORIZATION FOR SURGICAL OPERATION OR PROCEDURE                                                           I hereby authorize Dr. Delgado, my physician and his/her assistants (if applicable), which may include medical students, residents, and/or fellows, to perform the following surgical operation/ procedure and administer such anesthesia as may be determined necessary by my physician:  Operation/Procedure name (s) Flexible Sigmoidoscopy On Godwin Fonseca.  2.   I recognize that during the surgical operation/procedure, unforeseen conditions may necessitate additional or different procedures than those listed above.  I, therefore, further authorize and request that the above-named surgeon, assistants, or designees perform such procedures as are, in their judgment, necessary and desirable.    3.   My surgeon/physician has discussed prior to my surgery the potential benefits, risks and side effects of this procedure; the likelihood of achieving goals; and potential problems that might occur during recuperation.  They also discussed reasonable alternatives to the procedure, including risks, benefits, and side effects related to the alternatives and risks related to not receiving this procedure.  I have had all my questions answered and I acknowledge that no guarantee has been made as to the result that may be obtained.    4.   Should the need arise during my operation/procedure, which includes change of level of care prior to discharge, I also consent to the administration of blood and/or blood products.  Further, I understand that despite careful testing and screening of blood or blood products by collecting agencies, I may still be subject to ill effects as a result of receiving a blood transfusion and/or blood products.  The following are some, but not all, of the potential risks that can occur: fever and allergic reactions,  hemolytic reactions, transmission of diseases such as Hepatitis, AIDS and Cytomegalovirus (CMV) and fluid overload.  In the event that I wish to have an autologous transfusion of my own blood, or a directed donor transfusion, I will discuss this with my physician.  Check only if Refusing Blood or Blood Products  I understand refusal of blood or blood products as deemed necessary by my physician may have serious consequences to my condition to include possible death. I hereby assume responsibility for my refusal and release the hospital, its personnel, and my physicians from any responsibility for the consequences of my refusal.          o  Refuse   5.   I authorize the use of any specimen, organs, tissues, body parts or foreign objects that may be removed from my body during the operation/procedure for diagnosis, research or teaching purposes and their subsequent disposal by hospital authorities.  I also authorize the release of specimen test results and/or written reports to my treating physician on the hospital medical staff or other referring or consulting physicians involved in my care, at the discretion of the Pathologist or my treating physician.    6.   I consent to the photographing or videotaping of the operations or procedures to be performed, including appropriate portions of my body for medical, scientific, or educational purposes, provided my identity is not revealed by the pictures or by descriptive texts accompanying them.  If the procedure has been photographed/videotaped, the surgeon will obtain the original picture, image, videotape or CD.  The hospital will not be responsible for storage, release or maintenance of the picture, image, tape or CD.    7.   I consent to the presence of a  or observers in the operating room as deemed necessary by my physician or their designees.    8.   I recognize that in the event my procedure results in extended X-Ray/fluoroscopy time, I may  develop a skin reaction.    9. If I have a Do Not Attempt Resuscitation (DNAR) order in place, that status will be suspended while in the operating room, procedural suite, and during the recovery period unless otherwise explicitly stated by me (or a person authorized to consent on my behalf). The surgeon or my attending physician will determine when the applicable recovery period ends for purposes of reinstating the DNAR order.  10. Patients having a sterilization procedure: I understand that if the procedure is successful the results will be permanent and it will therefore be impossible for me to inseminate, conceive, or bear children.  I also understand that the procedure is intended to result in sterility, although the result has not been guaranteed.   11. I acknowledge that my physician has explained sedation/analgesia administration to me including the risk and benefits I consent to the administration of sedation/analgesia as may be necessary or desirable in the judgment of my physician.    I CERTIFY THAT I HAVE READ AND FULLY UNDERSTAND THE ABOVE CONSENT TO OPERATION and/or OTHER PROCEDURE.     _________________________________________ _________________________________     ___________________________________  Signature of Patient     Signature of Responsible Person                   Printed Name of Responsible Person                              _________________________________________ ______________________________        ___________________________________  Signature of Witness         Date  Time         Relationship to Patient    Patient Name: Godwin Fonseca    : 1980   Printed: 3/24/2024      Medical Record #: [id number]                                              Page 1 of 1   ADELIA-teaching

## 2018-07-20 PROBLEM — E78.2 HYPERLIPIDEMIA, MIXED: Status: ACTIVE | Noted: 2018-07-20

## 2018-07-20 PROBLEM — R35.89 POLYURIA: Status: RESOLVED | Noted: 2018-02-27 | Resolved: 2018-07-20

## 2018-07-20 PROBLEM — R74.8 ALKALINE PHOSPHATASE ELEVATION: Status: ACTIVE | Noted: 2018-07-20

## 2018-07-20 PROBLEM — IMO0002 CREATININE ELEVATION: Status: RESOLVED | Noted: 2018-02-27 | Resolved: 2018-07-20

## 2018-07-20 PROBLEM — R74.8 LOW SERUM HDL: Status: ACTIVE | Noted: 2018-07-20

## 2018-07-20 PROBLEM — R79.89 CREATININE ELEVATION: Status: RESOLVED | Noted: 2018-02-27 | Resolved: 2018-07-20

## 2018-07-23 PROCEDURE — 84080 ASSAY ALKALINE PHOSPHATASES: CPT | Performed by: INTERNAL MEDICINE

## 2018-07-23 PROCEDURE — 84075 ASSAY ALKALINE PHOSPHATASE: CPT | Performed by: INTERNAL MEDICINE

## 2018-07-23 PROCEDURE — 36415 COLL VENOUS BLD VENIPUNCTURE: CPT | Performed by: INTERNAL MEDICINE

## 2018-08-13 PROCEDURE — 81001 URINALYSIS AUTO W/SCOPE: CPT | Performed by: INTERNAL MEDICINE

## 2018-08-23 PROBLEM — M54.16 LUMBAR RADICULOPATHY: Status: ACTIVE | Noted: 2018-08-23

## 2018-08-29 PROBLEM — S09.93XA: Status: RESOLVED | Noted: 2018-02-14 | Resolved: 2018-08-29

## 2018-08-29 PROBLEM — M51.26 PROLAPSED LUMBAR DISC: Status: ACTIVE | Noted: 2018-08-29

## 2018-08-29 PROBLEM — M48.062 SPINAL STENOSIS OF LUMBAR REGION WITH NEUROGENIC CLAUDICATION: Status: ACTIVE | Noted: 2018-08-29

## 2018-08-29 PROBLEM — M54.16 LUMBAR RADICULOPATHY: Status: RESOLVED | Noted: 2018-08-23 | Resolved: 2018-08-29

## 2018-08-29 PROBLEM — R51.9 ACUTE NONINTRACTABLE HEADACHE, UNSPECIFIED HEADACHE TYPE: Status: RESOLVED | Noted: 2018-01-08 | Resolved: 2018-08-29

## 2018-08-29 PROBLEM — M54.50 ACUTE MIDLINE LOW BACK PAIN WITHOUT SCIATICA: Status: RESOLVED | Noted: 2018-04-20 | Resolved: 2018-08-29

## 2018-09-04 ENCOUNTER — ANESTHESIA EVENT (OUTPATIENT)
Dept: SURGERY | Facility: HOSPITAL | Age: 38
End: 2018-09-04
Payer: COMMERCIAL

## 2018-09-06 ENCOUNTER — ANESTHESIA (OUTPATIENT)
Dept: SURGERY | Facility: HOSPITAL | Age: 38
End: 2018-09-06
Payer: COMMERCIAL

## 2018-09-06 ENCOUNTER — APPOINTMENT (OUTPATIENT)
Dept: GENERAL RADIOLOGY | Facility: HOSPITAL | Age: 38
End: 2018-09-06
Attending: ORTHOPAEDIC SURGERY
Payer: COMMERCIAL

## 2018-09-06 ENCOUNTER — HOSPITAL ENCOUNTER (OUTPATIENT)
Facility: HOSPITAL | Age: 38
Setting detail: HOSPITAL OUTPATIENT SURGERY
Discharge: HOME | End: 2018-09-06
Attending: ORTHOPAEDIC SURGERY | Admitting: ORTHOPAEDIC SURGERY
Payer: COMMERCIAL

## 2018-09-06 VITALS
WEIGHT: 279 LBS | RESPIRATION RATE: 20 BRPM | TEMPERATURE: 98 F | HEIGHT: 74 IN | OXYGEN SATURATION: 99 % | BODY MASS INDEX: 35.81 KG/M2 | DIASTOLIC BLOOD PRESSURE: 82 MMHG | HEART RATE: 76 BPM | SYSTOLIC BLOOD PRESSURE: 132 MMHG

## 2018-09-06 DIAGNOSIS — M21.372 BILATERAL FOOT-DROP: ICD-10-CM

## 2018-09-06 DIAGNOSIS — M48.061 SPINAL STENOSIS OF LUMBAR REGION, UNSPECIFIED WHETHER NEUROGENIC CLAUDICATION PRESENT: ICD-10-CM

## 2018-09-06 DIAGNOSIS — M21.371 BILATERAL FOOT-DROP: ICD-10-CM

## 2018-09-06 DIAGNOSIS — G83.4 CAUDA EQUINA SYNDROME (HCC): Primary | ICD-10-CM

## 2018-09-06 PROCEDURE — 88311 DECALCIFY TISSUE: CPT | Performed by: ORTHOPAEDIC SURGERY

## 2018-09-06 PROCEDURE — 01NB0ZZ RELEASE LUMBAR NERVE, OPEN APPROACH: ICD-10-PCS | Performed by: ORTHOPAEDIC SURGERY

## 2018-09-06 PROCEDURE — 88304 TISSUE EXAM BY PATHOLOGIST: CPT | Performed by: ORTHOPAEDIC SURGERY

## 2018-09-06 PROCEDURE — A4216 STERILE WATER/SALINE, 10 ML: HCPCS

## 2018-09-06 PROCEDURE — 72100 X-RAY EXAM L-S SPINE 2/3 VWS: CPT | Performed by: ORTHOPAEDIC SURGERY

## 2018-09-06 DEVICE — DURAGENXS MATRIX DURAL REGENERATION MATRIX IS AN ABSORBABLE IMPLANT FOR REPAIR OF DURAL DEFECTS. DURAGENXS MATRIX IS AN EASY TO HANDLE, SOFT, WHITE, PLIABLE, NONFRIABLE, POROUS COLLAGEN MATRIX. DURAGENXS MATRIX IS SUPPLIED STERILE, NONPYROGENIC, FOR SINGLE USE IN DOUBLE PEEL PACKAGES IN A VARIETY OF SIZES.
Type: IMPLANTABLE DEVICE | Site: BACK | Status: FUNCTIONAL
Brand: DURAGEN XS™ DURAL REGENERATION MATRIX

## 2018-09-06 RX ORDER — 0.9 % SODIUM CHLORIDE 0.9 %
VIAL (ML) INJECTION AS NEEDED
Status: DISCONTINUED | OUTPATIENT
Start: 2018-09-06 | End: 2018-09-06 | Stop reason: HOSPADM

## 2018-09-06 RX ORDER — SODIUM CHLORIDE, SODIUM LACTATE, POTASSIUM CHLORIDE, CALCIUM CHLORIDE 600; 310; 30; 20 MG/100ML; MG/100ML; MG/100ML; MG/100ML
INJECTION, SOLUTION INTRAVENOUS CONTINUOUS
Status: DISCONTINUED | OUTPATIENT
Start: 2018-09-06 | End: 2018-09-06

## 2018-09-06 RX ORDER — ACETAMINOPHEN 500 MG
1000 TABLET ORAL ONCE AS NEEDED
Status: DISCONTINUED | OUTPATIENT
Start: 2018-09-06 | End: 2018-09-06

## 2018-09-06 RX ORDER — MEPERIDINE HYDROCHLORIDE 25 MG/ML
12.5 INJECTION INTRAMUSCULAR; INTRAVENOUS; SUBCUTANEOUS AS NEEDED
Status: DISCONTINUED | OUTPATIENT
Start: 2018-09-06 | End: 2018-09-06

## 2018-09-06 RX ORDER — MORPHINE SULFATE 4 MG/ML
2 INJECTION, SOLUTION INTRAMUSCULAR; INTRAVENOUS EVERY 5 MIN PRN
Status: DISCONTINUED | OUTPATIENT
Start: 2018-09-06 | End: 2018-09-06

## 2018-09-06 RX ORDER — GABAPENTIN 600 MG/1
600 TABLET ORAL ONCE
Status: COMPLETED | OUTPATIENT
Start: 2018-09-06 | End: 2018-09-06

## 2018-09-06 RX ORDER — BACITRACIN 50000 [USP'U]/1
INJECTION, POWDER, LYOPHILIZED, FOR SOLUTION INTRAMUSCULAR AS NEEDED
Status: DISCONTINUED | OUTPATIENT
Start: 2018-09-06 | End: 2018-09-06 | Stop reason: HOSPADM

## 2018-09-06 RX ORDER — HYDROCODONE BITARTRATE AND ACETAMINOPHEN 5; 325 MG/1; MG/1
1 TABLET ORAL AS NEEDED
Status: COMPLETED | OUTPATIENT
Start: 2018-09-06 | End: 2018-09-06

## 2018-09-06 RX ORDER — HYDROCODONE BITARTRATE AND ACETAMINOPHEN 5; 325 MG/1; MG/1
2 TABLET ORAL AS NEEDED
Status: COMPLETED | OUTPATIENT
Start: 2018-09-06 | End: 2018-09-06

## 2018-09-06 RX ORDER — MORPHINE SULFATE 4 MG/ML
INJECTION, SOLUTION INTRAMUSCULAR; INTRAVENOUS
Status: COMPLETED
Start: 2018-09-06 | End: 2018-09-06

## 2018-09-06 RX ORDER — BUPIVACAINE HYDROCHLORIDE AND EPINEPHRINE 5; 5 MG/ML; UG/ML
INJECTION, SOLUTION EPIDURAL; INTRACAUDAL; PERINEURAL AS NEEDED
Status: DISCONTINUED | OUTPATIENT
Start: 2018-09-06 | End: 2018-09-06 | Stop reason: HOSPADM

## 2018-09-06 RX ORDER — ONDANSETRON 2 MG/ML
4 INJECTION INTRAMUSCULAR; INTRAVENOUS AS NEEDED
Status: DISCONTINUED | OUTPATIENT
Start: 2018-09-06 | End: 2018-09-06

## 2018-09-06 RX ORDER — ONDANSETRON 2 MG/ML
4 INJECTION INTRAMUSCULAR; INTRAVENOUS ONCE
Status: COMPLETED | OUTPATIENT
Start: 2018-09-06 | End: 2018-09-06

## 2018-09-06 RX ORDER — NALOXONE HYDROCHLORIDE 0.4 MG/ML
80 INJECTION, SOLUTION INTRAMUSCULAR; INTRAVENOUS; SUBCUTANEOUS AS NEEDED
Status: DISCONTINUED | OUTPATIENT
Start: 2018-09-06 | End: 2018-09-06

## 2018-09-06 RX ORDER — DEXAMETHASONE SODIUM PHOSPHATE 4 MG/ML
4 VIAL (ML) INJECTION AS NEEDED
Status: DISCONTINUED | OUTPATIENT
Start: 2018-09-06 | End: 2018-09-06

## 2018-09-06 RX ORDER — MIDAZOLAM HYDROCHLORIDE 1 MG/ML
1 INJECTION INTRAMUSCULAR; INTRAVENOUS EVERY 5 MIN PRN
Status: DISCONTINUED | OUTPATIENT
Start: 2018-09-06 | End: 2018-09-06

## 2018-09-06 RX ORDER — ACETAMINOPHEN 500 MG
1000 TABLET ORAL ONCE
Status: DISCONTINUED | OUTPATIENT
Start: 2018-09-06 | End: 2018-09-06 | Stop reason: HOSPADM

## 2018-09-06 NOTE — H&P
Bryant Wilson is a 34year old male.      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 Disp:  Rfl: 0   CloNIDine HCl 0.1 MG Oral Tab Take 1 tablet (0.1 mg total) by mouth 2 (two) times daily.  After 5 days if blood pressure not controlled, 140/90 or less, increa by: Sakshi Ortiz          Physical Exam:  Vitals:  /74 (BP Location: Left arm, Patient Position: Sitting, Cuff Size: large)   Pulse 66   Temp 98.4 °F (36.9 °C) (Oral)   Resp 16   Ht 6' 2\"   Wt 277 lb   SpO2 96%   BMI 35.56 kg/m²        GEN: HCl 40 MG Oral Cap TK 1 C PO QD Disp: 30 capsule Rfl: 0   AmLODIPine Besylate 10 MG Oral Tab Take 2 tablets (10 mg total) by mouth daily.  Disp: 30 tablet Rfl: 1   FLUTICASONE PROPIONATE 50 MCG/ACT Nasal Suspension USE 2 SPRAYS IN EACH NOSTRIL EVERY DAY Dis 8.3 - 10.3 mg/dL 8.6   TOTAL PROTEIN      6.1 - 8.3 g/dL 7.7   Albumin      3.5 - 4.8 g/dL 3.9   Total Bilirubin      0.10 - 2.00 mg/dL 0.52   ALKALINE PHOSPHATASE      45 - 117 U/L 160 (H)   AST (SGOT)      15 - 41 U/L 14 (L)   ALT (SGPT)      17 - 63 U/L changes have occurred in the patient's condition since the H&P was performed. Risks and benefits discussed in detail. Risks including but not limited to bleeding infection, spinal leaks, neurologic injury, paralysis and worsening of symptoms.   No gua

## 2018-09-06 NOTE — PROGRESS NOTES
S: He has minimal back pain no leg pain. No numbness    Inspection:  Awake alert No acute distress. No difficulty breathing     Blood pressure 127/77, pulse 80, temperature 98.4 °F (36.9 °C), temperature source Temporal, resp.  rate 20, height 6' 2\" (1.88

## 2018-09-06 NOTE — ANESTHESIA POSTPROCEDURE EVALUATION
[address] Patient Status:  Hospital Outpatient Surgery   Age/Gender 34year old male MRN [id number]   [facility] SURGERY Attending Tianna Vanessa MD   Hosp Day # 0 PCP Mariana Shay       Anesthesia Post-op Note    Proc

## 2018-09-06 NOTE — ANESTHESIA PREPROCEDURE EVALUATION
PRE-OP EVALUATION    Patient Name: Joseline Smyth    Pre-op Diagnosis: Cauda equina syndrome ([facility] [address] [G83.4]    Procedure(s):  LUMBAR 4-LUMBAR 5 DECOMPRESSION DISCECTOMY    Surgeon(s) and Role:     * Giulia Diaz MD - Primary    Pre-op vitals reviewed.   Te EVERY DAY Disp: 3 Bottle Rfl: 0   Metoprolol Succinate  MG Oral Tablet 24 Hr Take 1 tablet (100 mg total) by mouth daily. Disp: 30 tablet Rfl: 5   lamoTRIgine (LAMICTAL) 150 MG Oral Tab Take 1 tablet (150 mg total) by mouth daily.  Disp: 30 tablet Rfl 08/31/2018    (H) 08/31/2018   CA 9.0 07/02/2018       Lab Results  Component Value Date   INR 1.0 08/31/2018         Airway      Mallampati: I  Mouth opening: >3 FB  TM distance: > 6 cm  Neck ROM: full Cardiovascular    Cardiovascular exam normal.

## 2018-09-06 NOTE — BRIEF OP NOTE
Pre-Operative Diagnosis: Cauda equina syndrome (HCC) [G83.4]     Post-Operative Diagnosis: Cauda equina syndrome (HCC) [G83.4]      Procedure Performed:   Procedure(s):  LUMBAR 4-LUMBAR 5 DECOMPRESSION DISCECTOMY, REMOVAL OF EPIDURAL MASS L4-5    Surgeon(s

## 2018-09-12 PROBLEM — M51.16 LUMBAR DISC PROLAPSE WITH COMPRESSION RADICULOPATHY: Status: ACTIVE | Noted: 2018-09-12

## 2018-09-12 PROBLEM — Z98.890 STATUS POST LUMBAR SURGERY: Status: ACTIVE | Noted: 2018-09-12

## 2018-09-12 PROBLEM — G83.4 CAUDA EQUINA SYNDROME (HCC): Status: ACTIVE | Noted: 2018-09-12

## 2018-09-26 NOTE — OPERATIVE REPORT
[facility]    PATIENT'S NAME: Mahi Tamayoir   ATTENDING PHYSICIAN: Alicia Fonseca M.D. OPERATING PHYSICIAN: Alicia Fonseca M.D.    PATIENT ACCOUNT#:   [de-identified]    LOCATION:  [address]  MEDICAL RECORD #:   [id number]       DATE Bovie electrocautery was used for hemostasis. Dissection was taken down to the level of the fascia. Subperiosteal dissection was taken at all levels outlined above. Self-retaining retractors were applied.   A curet was used to gain access to the canal an reapproximated with figure-of-eight 1-0 Vicryl; 2-0 Vicryl was used for the subcutaneous tissues, and running 3-0 subcuticular stitches were applied. Steri-Strips were applied. Sterile dressings were applied.   Needle and Ray-Lou counts were correct at th

## 2018-11-29 VITALS — WEIGHT: 270 LBS | HEIGHT: 74 IN | BODY MASS INDEX: 34.65 KG/M2

## 2019-05-10 ENCOUNTER — EMPLOYEE HEALTH (OUTPATIENT)
Dept: OCCUPATIONAL MEDICINE | Age: 39
End: 2019-05-10
Attending: PHYSICIAN ASSISTANT

## 2019-05-10 DIAGNOSIS — Z11.1 SCREENING-PULMONARY TB: Primary | ICD-10-CM

## 2019-05-10 DIAGNOSIS — Z01.84 IMMUNITY STATUS TESTING: ICD-10-CM

## 2019-05-10 PROCEDURE — 86762 RUBELLA ANTIBODY: CPT

## 2019-05-10 PROCEDURE — 86480 TB TEST CELL IMMUN MEASURE: CPT

## 2019-05-10 PROCEDURE — 86765 RUBEOLA ANTIBODY: CPT

## 2019-05-10 PROCEDURE — 86787 VARICELLA-ZOSTER ANTIBODY: CPT

## 2019-05-10 PROCEDURE — 86735 MUMPS ANTIBODY: CPT

## 2019-05-14 DIAGNOSIS — G83.4 CAUDA EQUINA SYNDROME (HCC): ICD-10-CM

## 2019-05-14 PROCEDURE — 81001 URINALYSIS AUTO W/SCOPE: CPT | Performed by: INTERNAL MEDICINE

## 2019-06-01 ENCOUNTER — HOSPITAL ENCOUNTER (OUTPATIENT)
Dept: ULTRASOUND IMAGING | Age: 39
Discharge: HOME | End: 2019-06-01
Attending: INTERNAL MEDICINE
Payer: MEDICAID

## 2019-06-01 DIAGNOSIS — R74.8 ALKALINE PHOSPHATASE ELEVATION: ICD-10-CM

## 2019-06-01 DIAGNOSIS — R80.9 PROTEINURIA, UNSPECIFIED TYPE: ICD-10-CM

## 2019-06-01 DIAGNOSIS — N18.30 STAGE 3 CHRONIC KIDNEY DISEASE (HCC): ICD-10-CM

## 2019-06-01 PROCEDURE — 76700 US EXAM ABDOM COMPLETE: CPT | Performed by: INTERNAL MEDICINE

## 2019-06-04 ENCOUNTER — HOSPITAL ENCOUNTER (OUTPATIENT)
Dept: ULTRASOUND IMAGING | Age: 39
Discharge: HOME | End: 2019-06-04
Attending: INTERNAL MEDICINE
Payer: MEDICAID

## 2019-06-04 DIAGNOSIS — I10 UNCONTROLLED HYPERTENSION: ICD-10-CM

## 2019-06-04 PROCEDURE — 76775 US EXAM ABDO BACK WALL LIM: CPT | Performed by: INTERNAL MEDICINE

## 2019-06-04 PROCEDURE — 93975 VASCULAR STUDY: CPT | Performed by: INTERNAL MEDICINE

## 2019-06-10 ENCOUNTER — HOSPITAL ENCOUNTER (INPATIENT)
Facility: HOSPITAL | Age: 39
LOS: 2 days | Discharge: HOME | DRG: 641 | End: 2019-06-13
Attending: EMERGENCY MEDICINE | Admitting: HOSPITALIST
Payer: MEDICAID

## 2019-06-10 ENCOUNTER — APPOINTMENT (OUTPATIENT)
Dept: CV DIAGNOSTICS | Facility: HOSPITAL | Age: 39
DRG: 641 | End: 2019-06-10
Attending: INTERNAL MEDICINE
Payer: MEDICAID

## 2019-06-10 ENCOUNTER — APPOINTMENT (OUTPATIENT)
Dept: GENERAL RADIOLOGY | Facility: HOSPITAL | Age: 39
DRG: 641 | End: 2019-06-10
Attending: EMERGENCY MEDICINE
Payer: MEDICAID

## 2019-06-10 ENCOUNTER — APPOINTMENT (OUTPATIENT)
Dept: CT IMAGING | Facility: HOSPITAL | Age: 39
DRG: 641 | End: 2019-06-10
Attending: INTERNAL MEDICINE
Payer: MEDICAID

## 2019-06-10 DIAGNOSIS — R55 SYNCOPE AND COLLAPSE: Primary | ICD-10-CM

## 2019-06-10 DIAGNOSIS — E87.6 HYPOKALEMIA: ICD-10-CM

## 2019-06-10 DIAGNOSIS — N28.9 ACUTE RENAL INSUFFICIENCY: ICD-10-CM

## 2019-06-10 DIAGNOSIS — R77.8 ELEVATED TROPONIN: ICD-10-CM

## 2019-06-10 PROBLEM — R79.89 ELEVATED TROPONIN: Status: ACTIVE | Noted: 2019-06-10

## 2019-06-10 PROCEDURE — 70450 CT HEAD/BRAIN W/O DYE: CPT | Performed by: INTERNAL MEDICINE

## 2019-06-10 PROCEDURE — 93306 TTE W/DOPPLER COMPLETE: CPT | Performed by: INTERNAL MEDICINE

## 2019-06-10 PROCEDURE — 99223 1ST HOSP IP/OBS HIGH 75: CPT | Performed by: INTERNAL MEDICINE

## 2019-06-10 PROCEDURE — 71046 X-RAY EXAM CHEST 2 VIEWS: CPT | Performed by: EMERGENCY MEDICINE

## 2019-06-10 RX ORDER — POTASSIUM CHLORIDE 20 MEQ/1
40 TABLET, EXTENDED RELEASE ORAL ONCE
Status: COMPLETED | OUTPATIENT
Start: 2019-06-10 | End: 2019-06-10

## 2019-06-10 RX ORDER — HYDRALAZINE HYDROCHLORIDE 50 MG/1
50 TABLET, FILM COATED ORAL 2 TIMES DAILY
Status: DISCONTINUED | OUTPATIENT
Start: 2019-06-10 | End: 2019-06-10

## 2019-06-10 RX ORDER — HYDRALAZINE HYDROCHLORIDE 20 MG/ML
5 INJECTION INTRAMUSCULAR; INTRAVENOUS EVERY 4 HOURS PRN
Status: DISCONTINUED | OUTPATIENT
Start: 2019-06-10 | End: 2019-06-13

## 2019-06-10 RX ORDER — HEPARIN SODIUM 5000 [USP'U]/ML
5000 INJECTION, SOLUTION INTRAVENOUS; SUBCUTANEOUS EVERY 8 HOURS SCHEDULED
Status: DISCONTINUED | OUTPATIENT
Start: 2019-06-10 | End: 2019-06-10

## 2019-06-10 RX ORDER — SODIUM CHLORIDE 9 MG/ML
INJECTION, SOLUTION INTRAVENOUS CONTINUOUS
Status: DISCONTINUED | OUTPATIENT
Start: 2019-06-10 | End: 2019-06-11

## 2019-06-10 RX ORDER — BUDESONIDE AND FORMOTEROL FUMARATE DIHYDRATE 160; 4.5 UG/1; UG/1
2 AEROSOL RESPIRATORY (INHALATION) 2 TIMES DAILY
COMMUNITY
End: 2019-11-22

## 2019-06-10 RX ORDER — LOSARTAN POTASSIUM 50 MG/1
50 TABLET ORAL DAILY
Status: ON HOLD | COMMUNITY
End: 2019-06-13

## 2019-06-10 RX ORDER — FLUOXETINE HYDROCHLORIDE 40 MG/1
40 CAPSULE ORAL DAILY
COMMUNITY
End: 2020-02-25

## 2019-06-10 RX ORDER — LAMOTRIGINE 100 MG/1
150 TABLET ORAL DAILY
Status: DISCONTINUED | OUTPATIENT
Start: 2019-06-11 | End: 2019-06-13

## 2019-06-10 RX ORDER — METOPROLOL SUCCINATE 100 MG/1
100 TABLET, EXTENDED RELEASE ORAL
Status: DISCONTINUED | OUTPATIENT
Start: 2019-06-11 | End: 2019-06-13

## 2019-06-10 RX ORDER — AMLODIPINE BESYLATE 5 MG/1
10 TABLET ORAL DAILY
Status: DISCONTINUED | OUTPATIENT
Start: 2019-06-11 | End: 2019-06-10

## 2019-06-10 RX ORDER — ARIPIPRAZOLE 2 MG/1
5 TABLET ORAL DAILY
Status: DISCONTINUED | OUTPATIENT
Start: 2019-06-11 | End: 2019-06-13

## 2019-06-10 RX ORDER — FLUOXETINE HYDROCHLORIDE 20 MG/1
40 CAPSULE ORAL DAILY
Status: DISCONTINUED | OUTPATIENT
Start: 2019-06-11 | End: 2019-06-13

## 2019-06-10 NOTE — ED INITIAL ASSESSMENT (HPI)
A/o x3, pt reports syncope this morning at work, states he felt dizzy before syncope episode. Per EMS pt was in heart block Francies Monique) but converted on his own.

## 2019-06-10 NOTE — H&P
[facility] Hospitalist H&P       CC: Patient presents with:  Syncope (cardiovascular, neurologic)       PCP: Gail Sosa    History of Present Illness:  Pt is a 40y/o M with hx severe HTN, HLD, obesity, bipolar d/o, asthma, and CKD who presented to the ED aft Inhalation Aerosol Inhale 2 puffs into the lungs 2 (two) times daily. Disp:  Rfl:    FLUoxetine HCl 40 MG Oral Cap Take 40 mg by mouth daily. Disp:  Rfl:    hydrALAzine HCl 50 MG Oral Tab Take 1 tablet (50 mg total) by mouth 2 (two) times daily.  Disp: 180 Diabetes Maternal Grandfather    • Hypertension Maternal Grandfather    • Alcohol and Other Disorders Associated Maternal Grandfather    • Hypertension Paternal Grandmother    • Hypertension Paternal Grandfather    • Cancer Sister         uterine and ovari myocardial disease, electrolyte  imbalance, or drug effects  Abnormal ECG  When compared with ECG of 04-JAN-2018 14:28,  ST elevation now present in Anterior leads  T wave inversion now evident in Inferior leads  Confirmed by Jeremiah Wilson M.D., [facility] ([id number]) on bladder wall thickening. The gallbladder wall measures 2 mm. No Hull's sign was elicited during this examination. There is no pericholecystic fluid present. Pancreas: The neck and the body of the pancreas is visualized and appears within normal limits. RENAL ARTERY ORIGIN:    59 cm/s,  ratio = 0.5 PROXIMAL:  78 cm/s,  ratio = 0.6 MID:       43 cm/s,  ratio = 0.3 DISTAL:    41 cm/s,  ratio = 0.4  MAXIMUM ACCELERATION TIME:  0.01-0.03 seconds Segmental arterial resistive indices:  0.51-0.61  RENAL VEINS: spent in d/w pt/family, coordination of care, and/or d/w staff.      Tamir Templeton MD   [facility] IM Hospitalist  Pager: [phone]

## 2019-06-10 NOTE — PROGRESS NOTES
NURSING ADMISSION NOTE      Patient admitted via Cart  Oriented to room. Safety precautions initiated. Bed in low position. Call light in reach. Pt arrived to room at this time. Alert. Admission database completed. Falling star-door to remain open.

## 2019-06-10 NOTE — CONSULTS
[facility] Cardiology Consultation NotePrudy Darion CHO    The patient was interviewed, examined, the chart was reviewed and the consult was dictated. This is a 37year old male with a chief complaint of syncope.     Impression:  1.syncope, possibly vagal  2.possib

## 2019-06-10 NOTE — ED PROVIDER NOTES
Patient Seen in: [facility] Emergency Department    History   Patient presents with:  Syncope (cardiovascular, neurologic)    Stated Complaint: Syncope    HPI    24-year-old male presents since morning he was at work and got up to walk to the bathro (36.7 °C) (Temporal)   Resp 18   Ht 188 cm (6' 2\")   Wt 122.5 kg   SpO2 98%   BMI 34.67 kg/m²         Physical Exam      Vital signs reviewed  General appearance: Patient is alert and in no acute distress  HEENT: Pupils equal react to light extraocular mu Final result                 Please view results for these tests on the individual orders. RAINBOW DRAW BLUE   RAINBOW DRAW LAVENDER   RAINBOW DRAW LIGHT GREEN   RAINBOW DRAW GOLD     EKG    Rate, intervals and axes as noted on EKG Report.   Rate: 73 ICD-10-CM Noted POA    Syncope and collapse R55 6/10/2019 Unknown       73

## 2019-06-10 NOTE — CONSULTS
[facility]    PATIENT'S NAME: Byron Brito   ATTENDING PHYSICIAN: Ivett Friedman MD   CONSULTING PHYSICIAN: Tom Stone M.D.    PATIENT ACCOUNT#:   [de-identified]    LOCATION:  [address] [place] [address]  MEDICAL RECORD #:   [id number]       DATE OF BIRTH:  0 repair. MEDICATIONS:  Home medications include losartan. Also stated to be on irbesartan, potassium, inhaler, hydralazine, clonidine, amlodipine, metoprolol, Abilify, Lamictal.    ALLERGIES:  Codeine, hydrochlorothiazide; rash.     SOCIAL HISTORY:  The

## 2019-06-10 NOTE — CONSULTS
[facility]  Report of Consultation    Elsie Mcnamara Patient Status:  Observation    1980 MRN [id number]   [facility] 8NE-A Attending Pio Chambers MD   Hosp Day # 0 PCP Stella Wang     Reason for Consultation:  ROBERT/CKD    Hi Disorders Associated Father    • Substance Abuse Father         cocaine   • Diabetes Mother    • Cancer Mother         multiple myeloma   • Hypertension Mother    • Anxiety Maternal Aunt    • Depression Maternal Aunt    • Anxiety Maternal Aunt    • Depress systems reviewed and otherwise unremarkable. Physical Exam:  Vital signs: Blood pressure 130/89, pulse 74, temperature 98.3 °F (36.8 °C), temperature source Oral, resp. rate 18, height 74\", weight 265 lb 8 oz, SpO2 94 %. General: No acute distress.  Al fluids  - monitor on tele  2. ROBERT/CKD - CKD due to HTN; bland UA with dipstick proteinuria; today noted to have glucosuria as well  ROBERT due to pre-renal etiology   -check Hgb A1C ; quantify protiein  - monitor labs  - fluids  3.  Diarrhea- likely gastroente

## 2019-06-11 PROCEDURE — 99232 SBSQ HOSP IP/OBS MODERATE 35: CPT | Performed by: INTERNAL MEDICINE

## 2019-06-11 RX ORDER — AMLODIPINE BESYLATE 5 MG/1
10 TABLET ORAL DAILY
Status: DISCONTINUED | OUTPATIENT
Start: 2019-06-11 | End: 2019-06-13

## 2019-06-11 RX ORDER — LOSARTAN POTASSIUM 50 MG/1
50 TABLET ORAL DAILY
Status: DISCONTINUED | OUTPATIENT
Start: 2019-06-11 | End: 2019-06-12

## 2019-06-11 RX ORDER — ACETAMINOPHEN 325 MG/1
650 TABLET ORAL EVERY 6 HOURS PRN
Status: DISCONTINUED | OUTPATIENT
Start: 2019-06-11 | End: 2019-06-13

## 2019-06-11 RX ORDER — POTASSIUM CHLORIDE 1.5 G/1.77G
40 POWDER, FOR SOLUTION ORAL ONCE
Status: COMPLETED | OUTPATIENT
Start: 2019-06-11 | End: 2019-06-11

## 2019-06-11 RX ORDER — POTASSIUM CHLORIDE 20 MEQ/1
40 TABLET, EXTENDED RELEASE ORAL ONCE
Status: DISCONTINUED | OUTPATIENT
Start: 2019-06-11 | End: 2019-06-13

## 2019-06-11 RX ORDER — EPLERENONE 25 MG/1
50 TABLET, FILM COATED ORAL DAILY
Status: DISCONTINUED | OUTPATIENT
Start: 2019-06-11 | End: 2019-06-13

## 2019-06-11 NOTE — PLAN OF CARE
Assumed care of patient around 0730, alert and oriented X4, no c/o CP/SOB, SpO2 95 % on RA  Rhythm/HR: NSR 70s    0.9% NaCl at 100ml/hr  Troponin neg this morning       Problem: SAFETY ADULT - FALL  Goal: Free from fall injury  Description  INTERVENTIONS:

## 2019-06-11 NOTE — PLAN OF CARE
Pt AOx4, fatigued. On RA, denies SOB. NSR/SB on tele. Pt denies pain. Pt denies lightheadedness, just c/o fatigue. IVF infusing per order. Contact plus isolation maintained pending stool sample. Pt denies N/V. Afebrile.  Plan for labs and monitor orthostats

## 2019-06-11 NOTE — PROGRESS NOTES
06/11/19 0513 06/11/19 0515 06/11/19 0516   Vital Signs   Pulse 80 69 73   Heart Rate Source Monitor Monitor Monitor   Resp 20  --   --    BP (!) 168/109 (!) 145/92 (!) 155/95   BP Location Right arm Right arm Right arm   BP Method Automatic Automatic A

## 2019-06-11 NOTE — PROGRESS NOTES
[facility]  Progress Note    Cem Suresh Patient Status:  Observation    1980 MRN [id number]   [facility] 8NE-A Attending Stacia Aschoff, MD   Hosp Day # 0 PCP GRACE WHARTON     No acute issues overnight  Feels better today  N HGB 15.8 15.3   MCV 87.3 88.7   .0 221.0   INR 0.98  --        Recent Labs     06/10/19  0903 06/11/19  0457    144   K 3.1* 3.6    114*   CO2 24.0 23.0   BUN 22* 16   CREATSERUM 2.94* 2.02*   CA 9.1 8.5   MG  --  2.3       Recent Labs

## 2019-06-11 NOTE — PROGRESS NOTES
[facility] Cardiology Progress Note - Yamila Beaver Patient Status:  Observation    1980 MRN [id number]   [facility] 8NE-A Attending Monica Yun MD   Hosp Day # 0 PCP Tamiko García     Subjective:  Patient do Intake/Output Summary (Last 24 hours) at 6/11/2019 1214  Last data filed at 6/11/2019 1100  Gross per 24 hour   Intake 1410 ml   Output 90 ml   Net 1320 ml       Last 3 Weights  06/10/19 1354 : 265 lb 8 oz (120.4 kg)  06/10/19 0902 : 270 lb (122.5 kg) Oral Daily   eplerenone (INSPRA) tab 50 mg 50 mg Oral Daily   amLODIPine Besylate (NORVASC) tab 10 mg 10 mg Oral Daily   ARIPiprazole (ABILIFY) tab 5 mg 5 mg Oral Daily   Fluticasone Furoate-Vilanterol (BREO ELLIPTA) 100-25 MCG/INH inhaler 1 puff 1 puff In

## 2019-06-11 NOTE — PROGRESS NOTES
[facility] Hospitalist Progress Note     Teresa Rojas Patient Status:  Observation    1980 MRN [id number]   [facility] 8NE-A Attending Noelle Gibbs MD   Hosp Day # 0 PCP Jorge Valdez     CC: follow up    SUBJECTIVE:  Dave [address] 118 overnight  V Meds:   Scheduled Medication:  • Potassium Chloride ER  40 mEq Oral Once   • Losartan Potassium  50 mg Oral Daily   • eplerenone  50 mg Oral Daily   • amLODIPine Besylate  10 mg Oral Daily   • ARIpiprazole  5 mg Oral Daily   • Fluticasone Furoa Possible d/c tomorrow.        Plan was discussed with patient, RN, Dr. Dinh Radha records reviewed confirming patient's medical history and medications.      Time spent: greater than 35 minutes spent in d/w pt/family, coordination of care, and

## 2019-06-12 PROBLEM — N18.30 CKD (CHRONIC KIDNEY DISEASE) STAGE 3, GFR 30-59 ML/MIN (HCC): Status: ACTIVE | Noted: 2018-01-08

## 2019-06-12 PROCEDURE — 99232 SBSQ HOSP IP/OBS MODERATE 35: CPT | Performed by: INTERNAL MEDICINE

## 2019-06-12 RX ORDER — HYDRALAZINE HYDROCHLORIDE 50 MG/1
50 TABLET, FILM COATED ORAL 2 TIMES DAILY
Status: DISCONTINUED | OUTPATIENT
Start: 2019-06-12 | End: 2019-06-13

## 2019-06-12 RX ORDER — LOSARTAN POTASSIUM 100 MG/1
100 TABLET ORAL DAILY
Status: DISCONTINUED | OUTPATIENT
Start: 2019-06-13 | End: 2019-06-13

## 2019-06-12 RX ORDER — POTASSIUM CHLORIDE 20 MEQ/1
40 TABLET, EXTENDED RELEASE ORAL EVERY 4 HOURS
Status: COMPLETED | OUTPATIENT
Start: 2019-06-12 | End: 2019-06-12

## 2019-06-12 NOTE — PROGRESS NOTES
[facility] Hospitalist Progress Note     Kamla López Patient Status:  Inpatient    1980 MRN [id number]   [facility] [place] 8NE-A Attending Jodie Horton MD   Hosp Day # 1 PCP Karen Turner     CC: follow up    SUBJECTIVE:  OC overnight  BP puff Inhalation Daily   • FLUoxetine HCl  40 mg Oral Daily   • lamoTRIgine  150 mg Oral Daily   • Lisdexamfetamine Dimesylate  50 mg Oral QAM   • Metoprolol Succinate ER  100 mg Oral Daily Beta Blocker     Continuous Infusing Medication:  PRN Medication:ac

## 2019-06-12 NOTE — PROGRESS NOTES
[facility] Cardiology Progress Note - Lynnae Goodpasture Patient Status:  Inpatient    1980 MRN [id number]   [facility] 8NE-A Attending Gabriela Wolfe MD   Hosp Day # 1 PCP Phan Singh     Subjective:  Patient is f Acute renal insufficiency     Elevated troponin     Hypokalemia     Orthostatic hypotension     Secondary hypertension     ROBERT (acute kidney injury) ([address]      Objective:   Temp: 97.6 °F (36.4 °C)  Pulse: 58  Resp: 18  BP: 141/79    Intake/Output:     [place] <0.045       Allergies:     Codeine                 NAUSEA AND VOMITING  Hctz [Hydrochloroth*    RASH    Medications:    Current Facility-Administered Medications:  [START ON 6/13/2019] losartan (COZAAR) tab 100 mg 100 mg Oral Daily   hydrALAzine HCl (APRE

## 2019-06-12 NOTE — PROGRESS NOTES
06/12/19 0615 06/12/19 0636 06/12/19 0641   Vital Signs   Pulse 69 61 75   Heart Rate Source Monitor Monitor Monitor   Cardiac Rhythm NSR NSR NSR   Resp 18  --   --    BP (!) 156/101 (!) 164/106 (!) 165/115   BP Location Right arm Right arm Right arm

## 2019-06-12 NOTE — PROGRESS NOTES
AOx4.  Calm, cooperative. Sleeping between care. Independent. Up ad osvaldo. SB-NSR on cardiac monitor. Hr in 50's when sleeping. Adequate saturation on RA. Lung sounds clear. Denies sob, chest discomfort, n/v.  Denies pain.    Voiding without dif

## 2019-06-12 NOTE — PROGRESS NOTES
[facility]  Nephrology Progress Note    Bryant Odor Patient Status:  Inpatient    1980 MRN [id number]   [facility] 8NE-A Attending Santos Samuels MD   Hosp Day # 1 PCP Janina Nicholson       SUBJECTIVE:  Tired today, no acute ev hours.    Meds:     Current Facility-Administered Medications:  Potassium Chloride ER (K-DUR M20) CR tab 40 mEq 40 mEq Oral Q4H   Potassium Chloride ER (K-DUR M20) CR tab 40 mEq 40 mEq Oral Once   acetaminophen (TYLENOL) tab 650 mg 650 mg Oral Q6H PRN   lo

## 2019-06-12 NOTE — PLAN OF CARE
Rcv'd A/o/3  Orthos q shift  Denies pain or discomfort  Denies dizziness or lightheaded  On tele with SB  Elevated B/p  Lung sounds diminished bilateral  Isra/ doesn't use c-pap  Fall risk    Problem: SAFETY ADULT - FALL  Goal: Free from fall injury  Tj

## 2019-06-13 VITALS
SYSTOLIC BLOOD PRESSURE: 138 MMHG | RESPIRATION RATE: 18 BRPM | OXYGEN SATURATION: 97 % | TEMPERATURE: 98 F | DIASTOLIC BLOOD PRESSURE: 90 MMHG | HEIGHT: 74 IN | WEIGHT: 259.94 LBS | HEART RATE: 75 BPM | BODY MASS INDEX: 33.36 KG/M2

## 2019-06-13 PROCEDURE — 99232 SBSQ HOSP IP/OBS MODERATE 35: CPT | Performed by: INTERNAL MEDICINE

## 2019-06-13 RX ORDER — LOSARTAN POTASSIUM 100 MG/1
100 TABLET ORAL DAILY
Qty: 30 TABLET | Refills: 3 | Status: SHIPPED | OUTPATIENT
Start: 2019-06-14 | End: 2020-04-06

## 2019-06-13 RX ORDER — EPLERENONE 50 MG/1
50 TABLET, FILM COATED ORAL DAILY
Qty: 30 TABLET | Refills: 3 | Status: SHIPPED | OUTPATIENT
Start: 2019-06-14 | End: 2019-06-24

## 2019-06-13 RX ORDER — POTASSIUM CHLORIDE 20 MEQ/1
20 TABLET, EXTENDED RELEASE ORAL ONCE
Status: COMPLETED | OUTPATIENT
Start: 2019-06-13 | End: 2019-06-13

## 2019-06-13 NOTE — PLAN OF CARE
Assumed care of patient around 0730. Patient in bed, awake and alert. Denies chest pain, shortness of breath, dizziness, or headache. Room air. Normal sinus on tele. Possible discharge today. Will continue to monitor.        Problem: Patient/Family Goals  G

## 2019-06-13 NOTE — PHYSICAL THERAPY NOTE
PHYSICAL THERAPY QUICK EVALUATION - INPATIENT    Room Number: 5073/4888-S  Evaluation Date: 6/13/2019  Presenting Problem: Syncope  Physician Order: PT Eval and Treat    Problem List  Principal Problem:    Syncope and collapse  Active Problems:    CKD (chr sec indicates increased risk of falling]  Age appropriate norms for SLS: 61-77 y/o mean = 27.0 sec      70-78 y/o mean = 17.2 sec      80-98 y/o mean = 8.5 sec     Modified Barron Balance Test=28/28  Fall Risk: no    1.  Sitting to standing                    changes speed but has significant gait deviations, or changes speed but loses balance but is able to recover and continue walking.  (0)  Severe Impairment: Cannot change speeds, or loses balance and has to reach for wall or be caught.     3. Gait with horiz Climbing 3-5 steps with a railing?: None       AM-PAC Score:  Raw Score: 24   Approx Degree of Impairment: 0%   Standardized Score (AM-PAC Scale): 61.14   CMS Modifier (G-Code): CH      FUNCTIONAL ABILITY STATUS  Gait Assessment  Gait Assistance: Independe

## 2019-06-13 NOTE — PLAN OF CARE
Rcv'd A/o/3  Denies pain or discomfort  On tele with SR  Lung sounds clear bilateral   noted  Fall risk  Problem: SAFETY ADULT - FALL  Goal: Free from fall injury  Description  INTERVENTIONS:  - Assess pt frequently for physical needs  - Identify cognit

## 2019-06-13 NOTE — PROGRESS NOTES
[facility] Cardiology Progress Note - Marcus  Patient Status:  Inpatient    1980 MRN [id number]   [facility] 8NE-A Attending Stacia Aschoff, MD   Hosp Day # 2 PCP Lucero Yates     Subjective:  Patient feel Orthostatic hypotension     Secondary hypertension     ROBERT (acute kidney injury) ([facility] [address]      Objective:   Temp: 98.9 °F (37.2 °C)  Pulse: 61  Resp: 20  BP: 141/87    Intake/Output:     Intake/Output Summary (Last 24 hours) at 6/13/2019 1021  Last data filed AST 22   ALB 4.0       Recent Labs   Lab 06/10/19  0903 06/11/19  0457   TROP 0.046* <0.045       Allergies:     Codeine                 NAUSEA AND VOMITING  Hctz [Hydrochloroth*    RASH    Medications:    Current Facility-Administered Medications:  bradlya

## 2019-06-13 NOTE — PLAN OF CARE
NURSING DISCHARGE NOTE    Discharged Home via Wheelchair. Accompanied by Support staff  Belongings Taken by patient/family. Discharge instructions given. All follow up appointments discussed. New medications and side effects discussed.  Patient bethany

## 2019-06-13 NOTE — PROGRESS NOTES
[facility]  Nephrology Progress Note    Elsie Mcnamara Patient Status:  Inpatient    1980 MRN [id number]   [facility] 8NE-A Attending Pio Chambers MD   Hosp Day # 2 PCP GRACE WHARTON       SUBJECTIVE:  BP sig better past 24 ho Recent Labs   Lab 06/10/19  0903   ALT 38   AST 22   ALB 4.0       No results for input(s): PGLU in the last 168 hours.     Meds:     Current Facility-Administered Medications:  losartan (COZAAR) tab 100 mg 100 mg Oral Daily   hydrALAzine HCl (APRESOL

## 2019-06-13 NOTE — PROGRESS NOTES
[facility] hospitalist daily note  Patient was seen/examined on 6/13/19    S; no dizziness, no headache, no new vision changes, no new numbness/tingling, no chest pain, no SOB, no abd pain, no nausea/emesis.  Diarrhea resolved  Per pt he passed gas and urinating

## 2019-06-14 NOTE — DISCHARGE SUMMARY
[facility]  Discharge Summary    Angelica Red Patient Status:  Inpatient    1980 MRN [id number]   [facility] 8NE-A Attending No att. providers found   Hosp Day # 2 PCP Yulisa Crespo     Date of Admission: 6/10/2019    Date of Dis transient 2nd degree HB  Seen by cardiology, management per cardiology for this     # Asthma  No wheezing     # Bipolar  Med ordered        Discharge when ok with involved services    History of Present Illness: see EPIC    Consultations: Cardiology, renal Visits: Follow-up Information     Kimberley Tran In 1 week.     Specialty:  Internal Medicine  Why:  sooner if problems  Contact information:  [address]  [facility] [address] [phone]             Nancy Orellana MD.    Specialties:  Rogelio Cardenas

## 2019-08-19 PROBLEM — I30.9 ACUTE PERICARDITIS, UNSPECIFIED TYPE (HCC): Status: ACTIVE | Noted: 2019-08-19

## 2019-08-19 PROBLEM — Z86.79 HISTORY OF MITRAL VALVE STENOSIS: Status: ACTIVE | Noted: 2019-08-19

## 2019-08-19 PROBLEM — R63.4 WEIGHT LOSS: Status: ACTIVE | Noted: 2019-08-19

## 2019-08-19 PROBLEM — R07.9 CHEST PAIN, UNSPECIFIED TYPE: Status: ACTIVE | Noted: 2019-08-19

## 2019-08-19 PROBLEM — I30.9 ACUTE PERICARDITIS, UNSPECIFIED TYPE: Status: ACTIVE | Noted: 2019-08-19

## 2020-02-25 PROBLEM — N28.9 ACUTE RENAL INSUFFICIENCY: Status: RESOLVED | Noted: 2019-06-10 | Resolved: 2020-02-25

## 2020-02-25 PROBLEM — F31.9 BIPOLAR 1 DISORDER (HCC): Status: RESOLVED | Noted: 2017-09-08 | Resolved: 2020-02-25

## 2020-02-25 PROBLEM — R77.8 ELEVATED TROPONIN: Status: RESOLVED | Noted: 2019-06-10 | Resolved: 2020-02-25

## 2020-02-25 PROBLEM — R79.89 ELEVATED TROPONIN: Status: RESOLVED | Noted: 2019-06-10 | Resolved: 2020-02-25

## 2020-03-05 ENCOUNTER — APPOINTMENT (OUTPATIENT)
Dept: URGENT CARE | Age: 40
End: 2020-03-05

## 2020-04-06 RX ORDER — LOSARTAN POTASSIUM 100 MG/1
100 TABLET ORAL DAILY
Qty: 30 TABLET | Refills: 3 | OUTPATIENT
Start: 2020-04-06

## 2020-07-01 PROBLEM — I10 UNCONTROLLED HYPERTENSION: Status: RESOLVED | Noted: 2018-01-08 | Resolved: 2020-07-01

## 2020-07-01 PROBLEM — M54.12 CERVICAL RADICULOPATHY: Status: ACTIVE | Noted: 2020-07-01

## 2020-08-26 PROBLEM — R77.9 ELEVATED BLOOD PROTEIN: Status: ACTIVE | Noted: 2020-08-26

## 2020-08-26 PROBLEM — D47.2 IGG MONOCLONAL PROTEIN DISORDER: Status: ACTIVE | Noted: 2020-08-26

## 2020-09-28 ENCOUNTER — TELEPHONE (OUTPATIENT)
Dept: SCHEDULING | Age: 40
End: 2020-09-28

## 2020-09-28 ENCOUNTER — OFFICE VISIT (OUTPATIENT)
Dept: FAMILY MEDICINE CLINIC | Facility: CLINIC | Age: 40
End: 2020-09-28
Payer: MEDICAID

## 2020-09-28 VITALS
RESPIRATION RATE: 16 BRPM | TEMPERATURE: 98 F | BODY MASS INDEX: 33.32 KG/M2 | SYSTOLIC BLOOD PRESSURE: 180 MMHG | HEART RATE: 86 BPM | OXYGEN SATURATION: 96 % | HEIGHT: 74 IN | WEIGHT: 259.63 LBS | DIASTOLIC BLOOD PRESSURE: 92 MMHG

## 2020-09-28 DIAGNOSIS — R03.0 ELEVATED BLOOD PRESSURE READING: ICD-10-CM

## 2020-09-28 DIAGNOSIS — J06.9 VIRAL UPPER RESPIRATORY INFECTION: Primary | ICD-10-CM

## 2020-09-28 PROCEDURE — 3008F BODY MASS INDEX DOCD: CPT | Performed by: NURSE PRACTITIONER

## 2020-09-28 PROCEDURE — 3080F DIAST BP >= 90 MM HG: CPT | Performed by: NURSE PRACTITIONER

## 2020-09-28 PROCEDURE — 3077F SYST BP >= 140 MM HG: CPT | Performed by: NURSE PRACTITIONER

## 2020-09-28 PROCEDURE — 99213 OFFICE O/P EST LOW 20 MIN: CPT | Performed by: NURSE PRACTITIONER

## 2020-09-28 RX ORDER — FLUTICASONE PROPIONATE 50 MCG
2 SPRAY, SUSPENSION (ML) NASAL DAILY
Qty: 1 BOTTLE | Refills: 0 | Status: ON HOLD | OUTPATIENT
Start: 2020-09-28 | End: 2020-12-02

## 2020-09-28 NOTE — PATIENT INSTRUCTIONS
Coronavirus Disease 2019 (COVID-19)     [place] [address] is committed to the safety and well-being of our patients, members, employees, and communities.  As concerns arise about the new strain of coronavirus that causes COVID-19, [place] [address] 4. If you have a medical appointment, call the healthcare provider ahead of time and tell them that you have or may have COVID-19.  5. For medical emergencies, call 911 and notify the dispatch personnel that you have or may have COVID-19.   6. Cover your c · At least 10 days have passed since symptoms first appeared OR if asymptomatic patient or date of symptom onset is unclear then use 10 days post COVID test date.    · At least 20 days have passed for severe illness (requiring hospitalization) OR if you are *Some people will be required to have a repeat COVID-19 test in order to be eligible to donate. If you’re instructed by Pasha France that a repeat test is required, please contact the [facility] COVID-19 Nurse Triage Line at [phone].     Additional Inf · You may use acetaminophen or ibuprofen to control pain and fever, unless another medicine was prescribed. If you have chronic liver or kidney disease, have ever had a stomach ulcer or gastrointestinal bleeding, or are taking blood-thinning medicines, yenny © 6764-9340 [employer] [address] [facility], [facility] [place]. All rights reserved. This information is not intended as a substitute for professional medical care. Always follow your healthcare professional's instructions.

## 2020-09-28 NOTE — PROGRESS NOTES
CHIEF COMPLAINT:   Patient presents with:  Ear Problem: bl earache, congestion, sinus pressure, dizzy, x tuesday       HPI:   Pastora Neumann is a 41year old male who presents for sinus congestion for  6  days. Symptoms have been worsening since onset.  Sinu • cloNIDine HCl 0.2 MG Oral Tab Take 1 tablet (0.2 mg total) by mouth 2 (two) times daily. (Patient taking differently: Take 0.1 mg by mouth 2 (two) times daily.   ) 180 tablet 1   • lamoTRIgine (LAMICTAL) 150 MG Oral Tab Take 1 tablet (150 mg total) by mu • Alcohol and Other Disorders Associated Maternal Grandfather    • Hypertension Paternal Grandmother    • Hypertension Paternal Grandfather    • Cancer Sister         uterine and ovarian   • Hypertension Sister    • Cancer Maternal Uncle         lung   • C ASSESSMENT: Viral upper respiratory infection  (primary encounter diagnosis)  Elevated blood pressure reading   1. Viral upper respiratory infection  - Fluticasone Propionate 50 MCG/ACT Nasal Suspension; 2 sprays by Each Nare route daily.   Dispense: 1 Pari * You were within 6 feet of someone who has COVID-19 for at least 15 minutes  * You provided care at home to someone who is sick with COVID-19  * You had direct physical contact with the person (touched, hugged, or kissed them)  * You shared eating or drin Seek Further Care     If you are awaiting test results or are confirmed positive for COVID -19, and your symptoms worsen at home with symptoms such as: extreme weakness, difficult breathing, or unrelenting fevers greater than 100.4 degrees Fahrenheit, you Ayana Beck, in conjunction with Brandi Fontanez, is looking for patients who have recovered from COVID-19 and would be interested in donating plasma.     Convalescent plasma is a component of blood that, in people who have recovered from COVID-19, https://www.[employer]com/  https://www.cdc.gov/coronavirus/2019-ncov/    Viral Upper Respiratory Illness (Adult)    You have a viral upper respiratory illness (URI), which is another t · Over-the-counter cold medicines will not shorten the length of time you’re sick, but they may be helpful for the following symptoms: cough, sore throat, and nasal and sinus congestion.  If you take prescription medicines, ask your healthcare provider or p

## 2020-10-05 ENCOUNTER — APPOINTMENT (OUTPATIENT)
Dept: LAB | Facility: HOSPITAL | Age: 40
End: 2020-10-05
Attending: NURSE PRACTITIONER
Payer: MEDICAID

## 2020-10-05 DIAGNOSIS — J06.9 VIRAL UPPER RESPIRATORY INFECTION: ICD-10-CM

## 2020-11-12 PROBLEM — D47.2 MGUS (MONOCLONAL GAMMOPATHY OF UNKNOWN SIGNIFICANCE): Status: ACTIVE | Noted: 2020-11-12

## 2020-11-28 ENCOUNTER — HOSPITAL ENCOUNTER (OUTPATIENT)
Facility: HOSPITAL | Age: 40
Setting detail: OBSERVATION
Discharge: HOME | End: 2020-12-02
Attending: EMERGENCY MEDICINE | Admitting: HOSPITALIST
Payer: MEDICAID

## 2020-11-28 ENCOUNTER — APPOINTMENT (OUTPATIENT)
Dept: GENERAL RADIOLOGY | Facility: HOSPITAL | Age: 40
End: 2020-11-28
Attending: EMERGENCY MEDICINE
Payer: MEDICAID

## 2020-11-28 DIAGNOSIS — I16.0 HYPERTENSIVE URGENCY: Primary | ICD-10-CM

## 2020-11-28 PROCEDURE — 71045 X-RAY EXAM CHEST 1 VIEW: CPT | Performed by: EMERGENCY MEDICINE

## 2020-11-28 RX ORDER — LABETALOL HYDROCHLORIDE 5 MG/ML
20 INJECTION, SOLUTION INTRAVENOUS ONCE
Status: COMPLETED | OUTPATIENT
Start: 2020-11-28 | End: 2020-11-28

## 2020-11-28 RX ORDER — HYDRALAZINE HYDROCHLORIDE 50 MG/1
50 TABLET, FILM COATED ORAL ONCE
Status: COMPLETED | OUTPATIENT
Start: 2020-11-28 | End: 2020-11-28

## 2020-11-28 RX ORDER — HYDRALAZINE HYDROCHLORIDE 20 MG/ML
10 INJECTION INTRAMUSCULAR; INTRAVENOUS ONCE
Status: DISCONTINUED | OUTPATIENT
Start: 2020-11-28 | End: 2020-11-28

## 2020-11-29 ENCOUNTER — APPOINTMENT (OUTPATIENT)
Dept: CT IMAGING | Facility: HOSPITAL | Age: 40
End: 2020-11-29
Attending: HOSPITALIST
Payer: MEDICAID

## 2020-11-29 PROBLEM — I16.0 HYPERTENSIVE URGENCY: Status: ACTIVE | Noted: 2020-11-29

## 2020-11-29 PROCEDURE — 70450 CT HEAD/BRAIN W/O DYE: CPT | Performed by: HOSPITALIST

## 2020-11-29 RX ORDER — INDAPAMIDE 2.5 MG/1
2.5 TABLET, FILM COATED ORAL EVERY MORNING
Status: DISCONTINUED | OUTPATIENT
Start: 2020-11-29 | End: 2020-12-02

## 2020-11-29 RX ORDER — BACLOFEN 10 MG/1
10 TABLET ORAL EVERY 8 HOURS PRN
Status: DISCONTINUED | OUTPATIENT
Start: 2020-11-29 | End: 2020-12-02

## 2020-11-29 RX ORDER — ENOXAPARIN SODIUM 100 MG/ML
40 INJECTION SUBCUTANEOUS DAILY
Status: DISCONTINUED | OUTPATIENT
Start: 2020-11-30 | End: 2020-11-29

## 2020-11-29 RX ORDER — HEPARIN SODIUM 5000 [USP'U]/ML
5000 INJECTION, SOLUTION INTRAVENOUS; SUBCUTANEOUS EVERY 8 HOURS SCHEDULED
Status: DISCONTINUED | OUTPATIENT
Start: 2020-11-29 | End: 2020-12-02

## 2020-11-29 RX ORDER — ACETAMINOPHEN 325 MG/1
650 TABLET ORAL EVERY 6 HOURS PRN
Status: DISCONTINUED | OUTPATIENT
Start: 2020-11-29 | End: 2020-12-02

## 2020-11-29 RX ORDER — HYDRALAZINE HYDROCHLORIDE 25 MG/1
25 TABLET, FILM COATED ORAL 3 TIMES DAILY PRN
Status: DISCONTINUED | OUTPATIENT
Start: 2020-11-29 | End: 2020-12-02

## 2020-11-29 RX ORDER — METOPROLOL SUCCINATE 100 MG/1
100 TABLET, EXTENDED RELEASE ORAL
Status: DISCONTINUED | OUTPATIENT
Start: 2020-11-29 | End: 2020-12-02

## 2020-11-29 RX ORDER — FLUTICASONE PROPIONATE 50 MCG
1 SPRAY, SUSPENSION (ML) NASAL DAILY
Status: DISCONTINUED | OUTPATIENT
Start: 2020-11-29 | End: 2020-12-02

## 2020-11-29 RX ORDER — FLUOXETINE HYDROCHLORIDE 20 MG/1
40 CAPSULE ORAL DAILY
Status: DISCONTINUED | OUTPATIENT
Start: 2020-11-29 | End: 2020-12-02

## 2020-11-29 RX ORDER — HYDRALAZINE HYDROCHLORIDE 50 MG/1
50 TABLET, FILM COATED ORAL EVERY 8 HOURS SCHEDULED
Status: DISCONTINUED | OUTPATIENT
Start: 2020-11-29 | End: 2020-11-29

## 2020-11-29 RX ORDER — POTASSIUM CHLORIDE 20 MEQ/1
20 TABLET, EXTENDED RELEASE ORAL ONCE
Status: COMPLETED | OUTPATIENT
Start: 2020-11-29 | End: 2020-11-29

## 2020-11-29 RX ORDER — ERGOCALCIFEROL 1.25 MG/1
50000 CAPSULE ORAL WEEKLY
Status: DISCONTINUED | OUTPATIENT
Start: 2020-11-29 | End: 2020-12-02

## 2020-11-29 RX ORDER — LAMOTRIGINE 150 MG/1
150 TABLET ORAL DAILY
Status: DISCONTINUED | OUTPATIENT
Start: 2020-11-29 | End: 2020-12-01

## 2020-11-29 RX ORDER — AMLODIPINE BESYLATE 5 MG/1
10 TABLET ORAL DAILY
Status: DISCONTINUED | OUTPATIENT
Start: 2020-11-29 | End: 2020-12-02

## 2020-11-29 RX ORDER — LOSARTAN POTASSIUM 100 MG/1
100 TABLET ORAL DAILY
Status: DISCONTINUED | OUTPATIENT
Start: 2020-11-29 | End: 2020-12-02

## 2020-11-29 RX ORDER — EPLERENONE 25 MG/1
50 TABLET, FILM COATED ORAL DAILY
Status: DISCONTINUED | OUTPATIENT
Start: 2020-11-29 | End: 2020-11-30

## 2020-11-29 RX ORDER — ARIPIPRAZOLE 5 MG/1
5 TABLET ORAL DAILY
Status: DISCONTINUED | OUTPATIENT
Start: 2020-11-29 | End: 2020-12-02

## 2020-11-29 NOTE — PLAN OF CARE
Tired today, up all night because of testing. Sleeping most of the day but wakes easily for assessment and medication administration. NSR on telemetry. VSS. No c/o cardiac symptoms. On room air. SPO2 remaining >90%. No c/o shortness of breath.  Lung activity and pre-medicate as appropriate  11/29/2020 1459 by Cammy Bahena RN  Outcome: Progressing  11/29/2020 1454 by Cmamy Bahena RN  Outcome: Progressing     Problem: SAFETY ADULT - FALL  Goal: Free from fall injury  Description: INTERVENTIONS:  - Ass and administer replacement therapy as ordered  11/29/2020 1459 by Chencho Sosa RN  Outcome: Progressing  11/29/2020 1454 by Chencho Sosa RN  Outcome: Progressing     Problem: RESPIRATORY - ADULT  Goal: Achieves optimal ventilation and oxygenation  Descri

## 2020-11-29 NOTE — H&P
[facility] Hospitalist H&P       CC: HTN, HA    PCP: Main [place] MD    History of Present Illness: Pt is a 42 yo with mmp including but not limited to IgG monoclonal protein disorder/MGUS, HTN/HL, asthma, CKD, bipolar disorder, major depression in parti Rfl: 0    •  ARIpiprazole (ABILIFY) 5 MG Oral Tab, Take 1 tablet (5 mg total) by mouth daily. , Disp: 30 tablet, Rfl: 0    •  Fluticasone Propionate 50 MCG/ACT Nasal Suspension, 2 sprays by Each Nare route daily. , Disp: 1 Bottle, Rfl: 0    •  BACLOFEN 10 MG Depression Maternal Aunt    • Anxiety Maternal Aunt    • Depression Maternal Aunt    • Bipolar Disorder Maternal Aunt    • Diabetes Maternal Grandmother    • Hypertension Maternal Grandmother    • Cancer Maternal Grandfather         stomach cancer   • Diab 240.0       Recent Labs   Lab 11/27/20  1444 11/28/20  2138    139   K 3.33* 3.6    109   CO2 22.3 26.0   BUN 19.0 23*   CREATSERUM 2.08*  2.13* 2.07*   * 119*   CA 9.4  9.3 8.5   MG 2.00  --    PHOS 2.50  2.10*  --        Recent Labs recommendations pending patient's clinical course.   [facility] hospitalist to continue to follow patient while in house    Patient and/or patient's family given opportunity to ask questions and note understanding and agreeing with therapeutic plan as outlined

## 2020-11-29 NOTE — CONSULTS
[facility]    Report of Consultation    Miguel Alvarez Patient Status:  Observation    1980 MRN [id number]   [facility] 2NE-A Attending Max Morales, *   Hosp Day # 0 PCP Ward Huerta MD     Date of consult: 2020 Anxiety Maternal Aunt    • Depression Maternal Aunt    • Anxiety Maternal Aunt    • Depression Maternal Aunt    • Bipolar Disorder Maternal Aunt    • Diabetes Maternal Grandmother    • Hypertension Maternal Grandmother    • Cancer Maternal Grandfather tab 25 mg, 25 mg, Oral, TID PRN  •  Heparin Sodium (Porcine) 5000 UNIT/ML injection 5,000 Units, 5,000 Units, Subcutaneous, Q8H [facility]  No current outpatient medications on file.         PHYSICAL EXAM:     Vital Signs: BP (!) 145/95 (BP Location: Left arm)   P 11/29/2020     Lab Results   Component Value Date    WBC 7.6 11/29/2020    RBC 5.26 11/29/2020    HGB 16.1 11/29/2020    HCT 47.0 11/29/2020    .0 11/29/2020    MPV 11.5 12/14/2012    MCV 89.4 11/29/2020    MCH 30.6 11/29/2020    MCHC 34.3 11/29/202 was sent to the ED by oncology for further evaluation    HTN urgency -- today BPs improved to 145/95 currently. Continue home meds, but increase hydralazine to 75 TID. Check ECHO to ensure.  Check UA and UPCR     CKD3B -- avoid nsaids and other nephrotoxins

## 2020-11-29 NOTE — PROGRESS NOTES
11/29/20 0205 11/29/20 0207 11/29/20 0210   Vital Signs   BP (!) 163/113 (!) 177/120 (!) 167/111   MAP (mmHg)  --  132 123   BP Location Right arm Right arm Right arm   BP Method Automatic Automatic Automatic   Patient Position Lying Sitting Standing

## 2020-11-29 NOTE — PLAN OF CARE
Report received from [address] [place] [address] care of Pt. At 0200. Pt. Is Axox4 and on room air w/ an O2 sat of 96%. Pt. Is neurologically intact (see flowsheets). Pt.'s bilateral breath sounds are clear. Pt. Denies any shortness of breath.  Pt. Has NSR w/ a pain with activity and pre-medicate as appropriate  Outcome: Progressing     Problem: SAFETY ADULT - FALL  Goal: Free from fall injury  Description: INTERVENTIONS:  - Assess pt frequently for physical needs  - Identify cognitive and physical deficits and b Position to facilitate oxygenation and minimize respiratory effort  - Oxygen supplementation based on oxygen saturation or ABGs  - Provide Smoking Cessation handout, if applicable  - Encourage broncho-pulmonary hygiene including cough, deep breathe, Incent

## 2020-11-29 NOTE — ED PROVIDER NOTES
Patient Seen in: [facility] Emergency Department      History   Patient presents with:  Hypertension    Stated Complaint: HTN - 180/120    HPI    This is a pleasant 47-year-old male presenting to the emergency department for hypertension.   Patient h ED Triage Vitals   BP 11/28/20 2130 (!) 193/125   Pulse 11/28/20 2130 97   Resp 11/28/20 2130 14   Temp 11/28/20 2136 98.6 °F (37 °C)   Temp src 11/28/20 2136 Temporal   SpO2 11/28/20 2130 95 %   O2 Device 11/28/20 2136 None (Room air)       Current:BP perico here in the emergency department the heart rate has improved as well as a systolic blood pressure has moved has improved but the diastolic has not changed and remains in the 120 range.   I have discussed the patient with the hospitalist service as we

## 2020-11-29 NOTE — DIETARY NOTE
[facility]     Admitting diagnosis:  Hypertensive urgency [I16.0]    Ht: 188 cm (6' 2\")  Wt: 113.3 kg (249 lb 12.5 oz). This is 131 % of IBW  Body mass index is 32.07 kg/m².   IBW: 86.4 kg    Labs/Meds reviewed

## 2020-11-30 ENCOUNTER — APPOINTMENT (OUTPATIENT)
Dept: GENERAL RADIOLOGY | Facility: HOSPITAL | Age: 40
End: 2020-11-30
Attending: HOSPITALIST
Payer: MEDICAID

## 2020-11-30 ENCOUNTER — APPOINTMENT (OUTPATIENT)
Dept: CV DIAGNOSTICS | Facility: HOSPITAL | Age: 40
End: 2020-11-30
Attending: INTERNAL MEDICINE
Payer: MEDICAID

## 2020-11-30 PROCEDURE — 73590 X-RAY EXAM OF LOWER LEG: CPT | Performed by: HOSPITALIST

## 2020-11-30 PROCEDURE — 93306 TTE W/DOPPLER COMPLETE: CPT | Performed by: INTERNAL MEDICINE

## 2020-11-30 PROCEDURE — B246ZZZ ULTRASONOGRAPHY OF RIGHT AND LEFT HEART: ICD-10-PCS | Performed by: INTERNAL MEDICINE

## 2020-11-30 PROCEDURE — 73610 X-RAY EXAM OF ANKLE: CPT | Performed by: HOSPITALIST

## 2020-11-30 RX ORDER — EPLERENONE 25 MG/1
100 TABLET, FILM COATED ORAL DAILY
Status: DISCONTINUED | OUTPATIENT
Start: 2020-12-01 | End: 2020-12-02

## 2020-11-30 NOTE — PROGRESS NOTES
[facility]    Nephrology Progress Note    Pastora Neumann Attending:  Sincere Xiong,*       SUBJECTIVE:     Some stomach discomfort but tolerated breakfast  States he was taking eplerenone 100 mg daily at home   No cp, sob, leg swelling, urina 89.4 11/29/2020    MCH 30.6 11/29/2020    MCHC 34.3 11/29/2020    RDW 12.4 11/29/2020    NEPRELIM 3.14 11/29/2020    NEPERCENT 41.3 11/29/2020    LYPERCENT 45.3 11/29/2020    MOPERCENT 8.5 11/29/2020    EOPERCENT 3.5 11/29/2020    BAPERCENT 1.3 11/29/2020 inhaler 1 puff, 1 puff, Inhalation, QAM    •  acetaminophen (TYLENOL) tab 650 mg, 650 mg, Oral, Q6H PRN    •  hydrALAzine HCl (APRESOLINE) tab 25 mg, 25 mg, Oral, TID PRN    •  Heparin Sodium (Porcine) 5000 UNIT/ML injection 5,000 Units, 5,000 Units, Subcu

## 2020-11-30 NOTE — PLAN OF CARE
Pt denies c/o pain, malaise, or cardiac symptoms. A&Ox4, drowsy. Lungs clear bilaterally with equal expansion, on room air. Pt NSR on monitor with regular rate. Abdomen soft, round, and non-tender with active bowel sounds in all four quadrants.  Pt is misael limitations  - Instruct pt to call for assistance with activity based on assessment  - Modify environment to reduce risk of injury  - Provide assistive devices as appropriate  - Consider OT/PT consult to assist with strengthening/mobility  - Encourage toil Therapy support as indicated  - Manage/alleviate anxiety  - Monitor for signs/symptoms of CO2 retention  Outcome: Progressing     Problem: MUSCULOSKELETAL - ADULT  Goal: Return mobility to safest level of function  Description: INTERVENTIONS:  - Assess pat

## 2020-11-30 NOTE — PLAN OF CARE
Pt received this am in bed. Pt sleepy but easily aroused. Tele monitor NSR Denies CP or SOB. B/P improved. Meds given per order. Held Lamictal per MD instructions and check Lamictal level as possible source of drowsiness.  Pt with K 3.3 IV potassium ordered

## 2020-11-30 NOTE — PROGRESS NOTES
[facility] Hospitalist Progress Note                                                                   [address]  4/12/1980    CC: FU fatigue and HTN    Interval History:  - Doing OK, BP improv 5.26   HGB 16.8 17.2 16.1   HCT 47.7 48.2 47.0   MCV 87.7 87.0 89.4   MCH 30.9 31.0 30.6   MCHC 35.2 35.7 34.3   RDW 12.3 12.5 12.4   NEPRELIM  --  3.52 3.14   WBC 6.61 7.8 7.6    240.0 216.0       Recent Labs   Lab 11/27/20  1444 11/28/20  2138 11/ subq     Possible home later today with ongoing outpatient with PCP/psych for weight loss/fatigue.     Preston Bee MD  [facility] Hospitalist  Pager: [phone]

## 2020-12-01 PROBLEM — F31.9 BIPOLAR DEPRESSION (HCC): Status: ACTIVE | Noted: 2017-09-08

## 2020-12-01 PROCEDURE — 90792 PSYCH DIAG EVAL W/MED SRVCS: CPT | Performed by: OTHER

## 2020-12-01 RX ORDER — LAMOTRIGINE 200 MG/1
200 TABLET ORAL DAILY
Status: DISCONTINUED | OUTPATIENT
Start: 2020-12-02 | End: 2020-12-02

## 2020-12-01 NOTE — CONSULTS
[facility]  Report of Psychiatric Consultation    Jessenia Salazar Patient Status:  Observation    1980 MRN [id number]   [facility] 2NE-A Attending Octavio Li,*   Hosp Day # 0 PCP Ned Joshua MD     Date of Admission: hrs per day. He denies feeling \"sad\" or \"depressed\", but admits to feeling down at times. 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  He has to assist her to the bathroom and does all the household work (med SURGERY  09/06/2018    L4-L5 Decomp Discectomy ROEM L4-L5   • LUMBAR MICRODISCECTOMY 1 LEVEL N/A 9/6/2018    Performed by Paco Snowden MD at [facility] MAIN OR   • VALVE REPAIR  1994    mitral     Family History   Problem Relation Age of Onset   • Hypertension losartan (COZAAR) tab 100 mg, 100 mg, Oral, Daily  •  Lisdexamfetamine Dimesylate (VYVANSE) cap 60 mg, 60 mg, Oral, QAM  •  Metoprolol Succinate ER (Toprol XL) 24 hr tab 100 mg, 100 mg, Oral, Daily Beta Blocker  •  Fluticasone Furoate-Vilanterol (BREO JAIME Weight loss. COMPARISON STUDY: None available.   TECHNIQUE:  Axial images of the chest, abdomen and pelvis were performed from the lung apices  through the pubic symphysis after the injection of nonionic intravenous contrast. Oral contrast was  used for th adenopathy. PERITONEUM: There is no free air or significant free fluid. ABDOMINAL WALL: There is a fat-containing umbilical hernia.   OSSEOUS STRUCTURES: The visualized osseous structures are within normal limits, without significant  focal lytic or blast

## 2020-12-01 NOTE — PROGRESS NOTES
[facility]    Nephrology Progress Note    Neela Ceja Attending:  Diego May,*       SUBJECTIVE:     Headaches have improved  Eating ok  Still feels very tired    PHYSICAL EXAM:     Vital Signs: BP (!) 139/95 (BP Location: Left arm)   P 11/29/2020    NEPERCENT 41.3 11/29/2020    LYPERCENT 45.3 11/29/2020    MOPERCENT 8.5 11/29/2020    EOPERCENT 3.5 11/29/2020    BAPERCENT 1.3 11/29/2020    NE 3.14 11/29/2020    LYMABS 3.45 11/29/2020    MOABSO 0.65 11/29/2020    EOABSO 0.27 11/29/2020 (APRESOLINE) tab 25 mg, 25 mg, Oral, TID PRN    •  Heparin Sodium (Porcine) 5000 UNIT/ML injection 5,000 Units, 5,000 Units, Subcutaneous, Q8H [address]    •  hydrALAzine HCl (APRESOLINE) tab 75 mg, 75 mg, Oral, Q8H [address]    •  ergocalciferol (DRISDOL/VITAMIN D2)

## 2020-12-01 NOTE — PLAN OF CARE
Rec in bed pt appears to be in a low mood. When you enter the room he appears sleep but once you speak to him is awake and responds appropriately to verbal and tactile stimuli. Once medical intervenions complete pt goes right back into a somnolent state.  P

## 2020-12-01 NOTE — PROGRESS NOTES
[facility] Hospitalist Progress Note                                                                   [address]  4/12/1980    CC: FU fatigue and HTN    Interval History:  - BP OK  - Still very MCV 87.7 87.0 89.4   MCH 30.9 31.0 30.6   MCHC 35.2 35.7 34.3   RDW 12.3 12.5 12.4   NEPRELIM  --  3.52 3.14   WBC 6.61 7.8 7.6    240.0 216.0       Recent Labs   Lab 11/27/20  1444 11/27/20  1444 11/28/20  2138 11/29/20  0818 11/30/20  0717 12/01 elevated  - He is on multiple mediations for his bipolar/depression. He reports no change in these meds for several years.  I discussed with him by process of elimination that I think we need to address the possibility that his poor energy/appetite/weight l

## 2020-12-01 NOTE — PLAN OF CARE
Assumed care of pt around 1930. Patient in bed. Oriented x4, but drowsy. Denies any chest pain or SOB. On RA. NSR on tele. Up SBA. Bed alarm on. Call light within reach. Will continue to monitor.     Problem: Patient/Family Goals  Goal: Patient/Family Long reduce risk of injury  - Provide assistive devices as appropriate  - Consider OT/PT consult to assist with strengthening/mobility  - Encourage toileting schedule  Outcome: Progressing     Problem: CARDIOVASCULAR - ADULT  Goal: Maintains optimal cardiac out

## 2020-12-02 VITALS
TEMPERATURE: 98 F | DIASTOLIC BLOOD PRESSURE: 71 MMHG | BODY MASS INDEX: 32.06 KG/M2 | HEART RATE: 62 BPM | HEIGHT: 74 IN | SYSTOLIC BLOOD PRESSURE: 126 MMHG | RESPIRATION RATE: 18 BRPM | OXYGEN SATURATION: 98 % | WEIGHT: 249.81 LBS

## 2020-12-02 RX ORDER — LAMOTRIGINE 200 MG/1
200 TABLET ORAL DAILY
Qty: 30 TABLET | Refills: 0 | Status: SHIPPED | OUTPATIENT
Start: 2020-12-03 | End: 2021-02-10

## 2020-12-02 RX ORDER — EPLERENONE 50 MG/1
100 TABLET, FILM COATED ORAL DAILY
Qty: 180 TABLET | Refills: 0 | Status: SHIPPED | OUTPATIENT
Start: 2020-12-02 | End: 2021-03-15

## 2020-12-02 RX ORDER — HYDRALAZINE HYDROCHLORIDE 25 MG/1
75 TABLET, FILM COATED ORAL EVERY 8 HOURS SCHEDULED
Qty: 90 TABLET | Refills: 0 | Status: SHIPPED | OUTPATIENT
Start: 2020-12-02 | End: 2021-03-12

## 2020-12-02 NOTE — DISCHARGE SUMMARY
General Medicine Discharge Summary     Patient ID:  Martell Monsivais  41year old  4/12/1980    Admit date: 11/28/2020    Discharge date and time:  12/2/20    Attending Physician: Gem Godinez bipolar/depression. He reports no change in these meds for several years.  I discussed with him by process of elimination that I think we need to address the possibility that his poor energy/appetite/weight loss may be related to his bipolar or mediation ef daily.    [place] Ran 250-50 MCG/DOSE Inhalation Aerosol Powder, Breath Activated  INHALE 1 PUFF INTO THE LUNGS EVERY 12 HOURS    naproxen 500 MG Oral Tab  Take 1 tablet (500 mg total) by mouth 2 (two) times daily as needed (pain).  for 30 days    indapami

## 2020-12-02 NOTE — PLAN OF CARE
Assumed care of patient at 0730. Alert and oriented. Vital signs stable. Sinus rhythm on telemetry. Fatigued. Lamictal dose adjusted per psych. Sleeping most of shift. Denies pain. SBP 120s-130s. Plan of care discussed with patient.  Will cont to jasvir assistance with activity based on assessment  - Modify environment to reduce risk of injury  - Provide assistive devices as appropriate  - Consider OT/PT consult to assist with strengthening/mobility  - Encourage toileting schedule  Outcome: Progressing Manage/alleviate anxiety  - Monitor for signs/symptoms of CO2 retention  Outcome: Progressing     Problem: MUSCULOSKELETAL - ADULT  Goal: Return mobility to safest level of function  Description: INTERVENTIONS:  - Assess patient stability and activity tole

## 2020-12-02 NOTE — PLAN OF CARE
Alert and oriented  SB/SR on tele  No c/o headache  BP on SBP at 140's  Plan of care discussed with pt.  69[address] Dr Razo Bolus notified of consult    Problem: Patient/Family Goals  Goal: Patient/Family Long Term Goal  Description: Patient's Long Term Goal: better n

## 2020-12-02 NOTE — PROGRESS NOTES
Discharge instructions discussed and sent with patient. IV discontinued. Discharged Home via Wheelchair. Accompanied by Support staff  Belongings Taken by patient/family.

## 2020-12-02 NOTE — PROGRESS NOTES
[facility]    Nephrology Progress Note    Pastora Neumann Attending:  Sincere Xiong,*       SUBJECTIVE:     Still feels very tired but no urinary complaints, leg swelling, leg cramps    PHYSICAL EXAM:     Vital Signs: /71 (BP Location: R NEPRELIM 3.14 11/29/2020    NEPERCENT 41.3 11/29/2020    LYPERCENT 45.3 11/29/2020    MOPERCENT 8.5 11/29/2020    EOPERCENT 3.5 11/29/2020    BAPERCENT 1.3 11/29/2020    NE 3.14 11/29/2020    LYMABS 3.45 11/29/2020    MOABSO 0.65 11/29/2020    EOABSO 0. hydrALAzine HCl (APRESOLINE) tab 25 mg, 25 mg, Oral, TID PRN    •  Heparin Sodium (Porcine) 5000 UNIT/ML injection 5,000 Units, 5,000 Units, Subcutaneous, Q8H [address]    •  hydrALAzine HCl (APRESOLINE) tab 75 mg, 75 mg, Oral, Q8H [address]    •  ergocalciferol (DRIS

## 2021-02-10 PROBLEM — F90.0 ADHD (ATTENTION DEFICIT HYPERACTIVITY DISORDER), INATTENTIVE TYPE: Status: ACTIVE | Noted: 2017-09-08

## 2021-02-10 PROBLEM — F31.81 BIPOLAR 2 DISORDER (HCC): Status: ACTIVE | Noted: 2021-02-10

## 2021-02-10 PROBLEM — Z56.9 EMPLOYMENT PROBLEM: Status: ACTIVE | Noted: 2021-02-10

## 2021-02-10 PROBLEM — F41.9 ANXIETY DISORDER, UNSPECIFIED TYPE: Status: ACTIVE | Noted: 2021-02-10

## 2021-02-10 PROBLEM — F43.9 STRESS: Status: ACTIVE | Noted: 2021-02-10

## 2021-03-12 PROBLEM — R20.2 PARESTHESIA OF FOOT, BILATERAL: Status: ACTIVE | Noted: 2021-03-12

## 2021-03-12 PROBLEM — K62.5 RECTAL BLEEDING: Status: ACTIVE | Noted: 2021-03-12

## 2021-03-12 PROBLEM — R29.898 WEAKNESS OF LEFT LOWER EXTREMITY: Status: ACTIVE | Noted: 2021-03-12

## 2021-04-02 ENCOUNTER — IMMUNIZATION (OUTPATIENT)
Dept: LAB | Age: 41
End: 2021-04-02
Attending: HOSPITALIST
Payer: MEDICAID

## 2021-04-02 DIAGNOSIS — Z23 NEED FOR VACCINATION: Primary | ICD-10-CM

## 2021-04-02 PROCEDURE — 0001A SARSCOV2 VAC 30MCG/0.3ML IM: CPT

## 2021-04-23 ENCOUNTER — IMMUNIZATION (OUTPATIENT)
Dept: LAB | Age: 41
End: 2021-04-23
Attending: HOSPITALIST
Payer: MEDICAID

## 2021-04-23 DIAGNOSIS — Z23 NEED FOR VACCINATION: Primary | ICD-10-CM

## 2021-04-23 PROCEDURE — 0002A SARSCOV2 VAC 30MCG/0.3ML IM: CPT

## 2021-05-18 PROBLEM — G45.9 TIA (TRANSIENT ISCHEMIC ATTACK): Status: ACTIVE | Noted: 2021-05-18

## 2021-05-18 PROBLEM — E66.9 OBESITY (BMI 30-39.9): Status: ACTIVE | Noted: 2021-05-18

## 2021-06-16 PROBLEM — D14.0 INVERTED PAPILLOMA OF NASAL CAVITY: Status: ACTIVE | Noted: 2021-06-16

## 2021-06-16 PROBLEM — M26.609 TMJ DYSFUNCTION: Status: ACTIVE | Noted: 2021-06-16

## 2021-10-27 ENCOUNTER — LAB ENCOUNTER (OUTPATIENT)
Dept: LAB | Age: 41
End: 2021-10-27
Attending: OTOLARYNGOLOGY
Payer: MEDICAID

## 2021-10-27 ENCOUNTER — ANESTHESIA EVENT (OUTPATIENT)
Dept: SURGERY | Facility: HOSPITAL | Age: 41
End: 2021-10-27
Payer: MEDICAID

## 2021-10-27 DIAGNOSIS — Z00.00 HEALTHCARE MAINTENANCE: Primary | ICD-10-CM

## 2021-10-27 DIAGNOSIS — Z01.818 PRE-OP TESTING: ICD-10-CM

## 2021-10-29 ENCOUNTER — ANESTHESIA (OUTPATIENT)
Dept: SURGERY | Facility: HOSPITAL | Age: 41
End: 2021-10-29
Payer: MEDICAID

## 2021-10-29 ENCOUNTER — HOSPITAL ENCOUNTER (OUTPATIENT)
Facility: HOSPITAL | Age: 41
Setting detail: HOSPITAL OUTPATIENT SURGERY
Discharge: HOME | End: 2021-10-29
Attending: OTOLARYNGOLOGY | Admitting: OTOLARYNGOLOGY
Payer: MEDICAID

## 2021-10-29 VITALS
TEMPERATURE: 97 F | DIASTOLIC BLOOD PRESSURE: 98 MMHG | RESPIRATION RATE: 19 BRPM | WEIGHT: 277.31 LBS | HEART RATE: 68 BPM | BODY MASS INDEX: 35.59 KG/M2 | HEIGHT: 74 IN | SYSTOLIC BLOOD PRESSURE: 140 MMHG | OXYGEN SATURATION: 98 %

## 2021-10-29 DIAGNOSIS — Z01.818 PRE-OP TESTING: Primary | ICD-10-CM

## 2021-10-29 DIAGNOSIS — D14.0 BENIGN NEOPLASM OF CARTILAGE OF NOSE: ICD-10-CM

## 2021-10-29 PROCEDURE — 09BKXZZ EXCISION OF NASAL MUCOSA AND SOFT TISSUE, EXTERNAL APPROACH: ICD-10-PCS | Performed by: OTOLARYNGOLOGY

## 2021-10-29 PROCEDURE — 88305 TISSUE EXAM BY PATHOLOGIST: CPT | Performed by: OTOLARYNGOLOGY

## 2021-10-29 RX ORDER — HYDROMORPHONE HYDROCHLORIDE 1 MG/ML
INJECTION, SOLUTION INTRAMUSCULAR; INTRAVENOUS; SUBCUTANEOUS
Status: COMPLETED
Start: 2021-10-29 | End: 2021-10-29

## 2021-10-29 RX ORDER — DIPHENHYDRAMINE HYDROCHLORIDE 50 MG/ML
12.5 INJECTION INTRAMUSCULAR; INTRAVENOUS AS NEEDED
Status: DISCONTINUED | OUTPATIENT
Start: 2021-10-29 | End: 2021-10-29

## 2021-10-29 RX ORDER — ACETAMINOPHEN 500 MG
500 TABLET ORAL EVERY 6 HOURS PRN
COMMUNITY

## 2021-10-29 RX ORDER — HYDROCODONE BITARTRATE AND ACETAMINOPHEN 5; 325 MG/1; MG/1
1 TABLET ORAL AS NEEDED
Status: DISCONTINUED | OUTPATIENT
Start: 2021-10-29 | End: 2021-10-29

## 2021-10-29 RX ORDER — HYDROCODONE BITARTRATE AND ACETAMINOPHEN 5; 325 MG/1; MG/1
2 TABLET ORAL AS NEEDED
Status: DISCONTINUED | OUTPATIENT
Start: 2021-10-29 | End: 2021-10-29

## 2021-10-29 RX ORDER — ACETAMINOPHEN 500 MG
1000 TABLET ORAL ONCE
Status: DISCONTINUED | OUTPATIENT
Start: 2021-10-29 | End: 2021-10-29

## 2021-10-29 RX ORDER — SODIUM CHLORIDE, SODIUM LACTATE, POTASSIUM CHLORIDE, CALCIUM CHLORIDE 600; 310; 30; 20 MG/100ML; MG/100ML; MG/100ML; MG/100ML
INJECTION, SOLUTION INTRAVENOUS CONTINUOUS
Status: DISCONTINUED | OUTPATIENT
Start: 2021-10-29 | End: 2021-10-29

## 2021-10-29 RX ORDER — ONDANSETRON 2 MG/ML
INJECTION INTRAMUSCULAR; INTRAVENOUS AS NEEDED
Status: DISCONTINUED | OUTPATIENT
Start: 2021-10-29 | End: 2021-10-29 | Stop reason: SURG

## 2021-10-29 RX ORDER — METOCLOPRAMIDE HYDROCHLORIDE 5 MG/ML
INJECTION INTRAMUSCULAR; INTRAVENOUS AS NEEDED
Status: DISCONTINUED | OUTPATIENT
Start: 2021-10-29 | End: 2021-10-29 | Stop reason: SURG

## 2021-10-29 RX ORDER — LIDOCAINE HYDROCHLORIDE AND EPINEPHRINE 10; 10 MG/ML; UG/ML
INJECTION, SOLUTION INFILTRATION; PERINEURAL AS NEEDED
Status: DISCONTINUED | OUTPATIENT
Start: 2021-10-29 | End: 2021-10-29 | Stop reason: HOSPADM

## 2021-10-29 RX ORDER — PHENYLEPHRINE HCL 10 MG/ML
VIAL (ML) INJECTION AS NEEDED
Status: DISCONTINUED | OUTPATIENT
Start: 2021-10-29 | End: 2021-10-29 | Stop reason: SURG

## 2021-10-29 RX ORDER — HYDROMORPHONE HYDROCHLORIDE 1 MG/ML
0.4 INJECTION, SOLUTION INTRAMUSCULAR; INTRAVENOUS; SUBCUTANEOUS EVERY 5 MIN PRN
Status: DISCONTINUED | OUTPATIENT
Start: 2021-10-29 | End: 2021-10-29

## 2021-10-29 RX ORDER — LIDOCAINE HYDROCHLORIDE 10 MG/ML
INJECTION, SOLUTION EPIDURAL; INFILTRATION; INTRACAUDAL; PERINEURAL AS NEEDED
Status: DISCONTINUED | OUTPATIENT
Start: 2021-10-29 | End: 2021-10-29 | Stop reason: SURG

## 2021-10-29 RX ORDER — ACETAMINOPHEN 500 MG
1000 TABLET ORAL ONCE AS NEEDED
Status: COMPLETED | OUTPATIENT
Start: 2021-10-29 | End: 2021-10-29

## 2021-10-29 RX ORDER — LABETALOL HYDROCHLORIDE 5 MG/ML
5 INJECTION, SOLUTION INTRAVENOUS EVERY 5 MIN PRN
Status: DISCONTINUED | OUTPATIENT
Start: 2021-10-29 | End: 2021-10-29

## 2021-10-29 RX ORDER — MIDAZOLAM HYDROCHLORIDE 1 MG/ML
INJECTION INTRAMUSCULAR; INTRAVENOUS AS NEEDED
Status: DISCONTINUED | OUTPATIENT
Start: 2021-10-29 | End: 2021-10-29 | Stop reason: SURG

## 2021-10-29 RX ORDER — DEXAMETHASONE SODIUM PHOSPHATE 4 MG/ML
VIAL (ML) INJECTION AS NEEDED
Status: DISCONTINUED | OUTPATIENT
Start: 2021-10-29 | End: 2021-10-29 | Stop reason: SURG

## 2021-10-29 RX ORDER — ACETAMINOPHEN 500 MG
TABLET ORAL
Status: COMPLETED
Start: 2021-10-29 | End: 2021-10-29

## 2021-10-29 RX ORDER — ROCURONIUM BROMIDE 10 MG/ML
INJECTION, SOLUTION INTRAVENOUS AS NEEDED
Status: DISCONTINUED | OUTPATIENT
Start: 2021-10-29 | End: 2021-10-29 | Stop reason: SURG

## 2021-10-29 RX ORDER — ONDANSETRON 2 MG/ML
4 INJECTION INTRAMUSCULAR; INTRAVENOUS AS NEEDED
Status: DISCONTINUED | OUTPATIENT
Start: 2021-10-29 | End: 2021-10-29

## 2021-10-29 RX ORDER — NALOXONE HYDROCHLORIDE 0.4 MG/ML
80 INJECTION, SOLUTION INTRAMUSCULAR; INTRAVENOUS; SUBCUTANEOUS AS NEEDED
Status: DISCONTINUED | OUTPATIENT
Start: 2021-10-29 | End: 2021-10-29

## 2021-10-29 RX ADMIN — ONDANSETRON 4 MG: 2 INJECTION INTRAMUSCULAR; INTRAVENOUS at 14:43:00

## 2021-10-29 RX ADMIN — LIDOCAINE HYDROCHLORIDE 100 MG: 10 INJECTION, SOLUTION EPIDURAL; INFILTRATION; INTRACAUDAL; PERINEURAL at 14:31:00

## 2021-10-29 RX ADMIN — PHENYLEPHRINE HCL 50 MCG: 10 MG/ML VIAL (ML) INJECTION at 14:59:00

## 2021-10-29 RX ADMIN — MIDAZOLAM HYDROCHLORIDE 2 MG: 1 INJECTION INTRAMUSCULAR; INTRAVENOUS at 14:31:00

## 2021-10-29 RX ADMIN — DEXAMETHASONE SODIUM PHOSPHATE 8 MG: 4 MG/ML VIAL (ML) INJECTION at 14:43:00

## 2021-10-29 RX ADMIN — SODIUM CHLORIDE, SODIUM LACTATE, POTASSIUM CHLORIDE, CALCIUM CHLORIDE: 600; 310; 30; 20 INJECTION, SOLUTION INTRAVENOUS at 15:18:00

## 2021-10-29 RX ADMIN — PHENYLEPHRINE HCL 50 MCG: 10 MG/ML VIAL (ML) INJECTION at 14:52:00

## 2021-10-29 RX ADMIN — METOCLOPRAMIDE HYDROCHLORIDE 10 MG: 5 INJECTION INTRAMUSCULAR; INTRAVENOUS at 14:43:00

## 2021-10-29 RX ADMIN — ROCURONIUM BROMIDE 50 MG: 10 INJECTION, SOLUTION INTRAVENOUS at 14:37:00

## 2021-10-29 RX ADMIN — SODIUM CHLORIDE, SODIUM LACTATE, POTASSIUM CHLORIDE, CALCIUM CHLORIDE: 600; 310; 30; 20 INJECTION, SOLUTION INTRAVENOUS at 14:31:00

## 2021-10-29 RX ADMIN — PHENYLEPHRINE HCL 50 MCG: 10 MG/ML VIAL (ML) INJECTION at 14:55:00

## 2021-10-29 NOTE — ANESTHESIA PREPROCEDURE EVALUATION
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 (+) obesity  (+) hypertension       (+) past MI      (+) valvular problems/murmurs and MR                       Endo/Other    Negative endo/other ROS.                               Pulmonar surgeon and patient.     Comment: Discussed r/b/a of general anesthesia including PONV, sore throat and hoarseness from airway manipulation, post-operative pain/discomfort, dental /lip injury, pressure/nerve injuries from positioning, recall, and other seri

## 2021-10-29 NOTE — OPERATIVE REPORT
[facility]  Operative Report    Verenice Villalobos Location: OR   CSN [id number] MRN [id number]   Admission Date 10/29/2021 Operation Date 10/29/2021   Attending Physician Damian Finnegan MD Operating Physician Janette Oliver MD     Pre-Operative Diagnosis:

## 2021-10-29 NOTE — H&P
History and physical by Dr. Wendy Ramirez on 10/22 reviewed and up to date. No changes to H&P.     Plan is excision of left nasal papilloma

## 2021-10-29 NOTE — ANESTHESIA POSTPROCEDURE EVALUATION
[address] Patient Status:  Hospital Outpatient Surgery   Age/Gender 35year old male MRN [id number]   Location [facility] Attending Royce Michel MD   Hosp Day # 0 PCP Trenna Mangle, MD Burt Barthel

## 2021-10-29 NOTE — ANESTHESIA PROCEDURE NOTES
Airway  Date/Time: 10/29/2021 2:38 PM  Urgency: elective    Airway not difficult    General Information and Staff    Patient location during procedure: OR  Anesthesiologist: Lyudmila Peck MD  Performed: anesthesiologist     Indications and Patient

## 2021-11-01 NOTE — PROGRESS NOTES
Attempted to contact pt via [phone] but message stated phone does not receive incoming calls. Will send message via [employer].

## 2021-11-19 PROBLEM — E11.9 TYPE 2 DIABETES MELLITUS WITHOUT COMPLICATION, WITHOUT LONG-TERM CURRENT USE OF INSULIN (HCC): Status: ACTIVE | Noted: 2021-11-19

## 2022-03-07 ENCOUNTER — HOSPITAL ENCOUNTER (EMERGENCY)
Age: 42
Discharge: LEFT BEFORE DISCHARGE | End: 2022-03-07
Attending: EMERGENCY MEDICINE
Payer: MEDICAID

## 2022-03-07 ENCOUNTER — APPOINTMENT (OUTPATIENT)
Dept: GENERAL RADIOLOGY | Age: 42
End: 2022-03-07
Attending: EMERGENCY MEDICINE
Payer: MEDICAID

## 2022-03-07 ENCOUNTER — APPOINTMENT (OUTPATIENT)
Dept: CT IMAGING | Age: 42
End: 2022-03-07
Attending: EMERGENCY MEDICINE
Payer: MEDICAID

## 2022-03-07 ENCOUNTER — HOSPITAL ENCOUNTER (EMERGENCY)
Facility: HOSPITAL | Age: 42
Discharge: STILL ADMITTED OR ADMINISTRATIVE | End: 2022-03-07
Payer: MEDICAID

## 2022-03-07 VITALS
SYSTOLIC BLOOD PRESSURE: 165 MMHG | HEART RATE: 90 BPM | TEMPERATURE: 97 F | HEIGHT: 74 IN | DIASTOLIC BLOOD PRESSURE: 112 MMHG | WEIGHT: 288 LBS | OXYGEN SATURATION: 95 % | RESPIRATION RATE: 22 BRPM | BODY MASS INDEX: 36.96 KG/M2

## 2022-03-07 DIAGNOSIS — I10 ACCELERATED HYPERTENSION: Primary | ICD-10-CM

## 2022-03-07 DIAGNOSIS — N18.9 ACUTE ON CHRONIC RENAL INSUFFICIENCY: ICD-10-CM

## 2022-03-07 DIAGNOSIS — N28.9 ACUTE ON CHRONIC RENAL INSUFFICIENCY: ICD-10-CM

## 2022-03-07 PROBLEM — E11.22 TYPE 2 DIABETES MELLITUS WITH STAGE 3B CHRONIC KIDNEY DISEASE, WITHOUT LONG-TERM CURRENT USE OF INSULIN (HCC): Status: ACTIVE | Noted: 2021-11-19

## 2022-03-07 PROBLEM — N18.32 TYPE 2 DIABETES MELLITUS WITH STAGE 3B CHRONIC KIDNEY DISEASE, WITHOUT LONG-TERM CURRENT USE OF INSULIN (HCC): Status: ACTIVE | Noted: 2021-11-19

## 2022-03-07 LAB
ALBUMIN SERPL-MCNC: 3.6 G/DL (ref 3.4–5)
ALBUMIN/GLOB SERPL: 0.9 {RATIO} (ref 1–2)
ALP LIVER SERPL-CCNC: 114 U/L
ALT SERPL-CCNC: 20 U/L
AST SERPL-CCNC: 12 U/L (ref 15–37)
BASOPHILS # BLD AUTO: 0.13 X10(3) UL (ref 0–0.2)
BASOPHILS NFR BLD AUTO: 1.7 %
BILIRUB SERPL-MCNC: 0.4 MG/DL (ref 0.1–2)
BUN BLD-MCNC: 35 MG/DL (ref 7–18)
CALCIUM BLD-MCNC: 9 MG/DL (ref 8.5–10.1)
CHLORIDE SERPL-SCNC: 105 MMOL/L (ref 98–112)
CO2 SERPL-SCNC: 25 MMOL/L (ref 21–32)
CREAT BLD-MCNC: 2.96 MG/DL
EOSINOPHIL # BLD AUTO: 0.37 X10(3) UL (ref 0–0.7)
EOSINOPHIL NFR BLD AUTO: 4.7 %
ERYTHROCYTE [DISTWIDTH] IN BLOOD BY AUTOMATED COUNT: 12.6 %
GLOBULIN PLAS-MCNC: 3.9 G/DL (ref 2.8–4.4)
GLUCOSE BLD-MCNC: 128 MG/DL (ref 70–99)
HCT VFR BLD AUTO: 48.9 %
HGB BLD-MCNC: 16.6 G/DL
IMM GRANULOCYTES # BLD AUTO: 0.02 X10(3) UL (ref 0–1)
IMM GRANULOCYTES NFR BLD: 0.3 %
LIPASE SERPL-CCNC: 176 U/L (ref 73–393)
LYMPHOCYTES # BLD AUTO: 2.8 X10(3) UL (ref 1–4)
LYMPHOCYTES NFR BLD AUTO: 35.8 %
MCH RBC QN AUTO: 30.7 PG (ref 26–34)
MCHC RBC AUTO-ENTMCNC: 33.9 G/DL (ref 31–37)
MCV RBC AUTO: 90.6 FL
MONOCYTES # BLD AUTO: 0.82 X10(3) UL (ref 0.1–1)
MONOCYTES NFR BLD AUTO: 10.5 %
NEUTROPHILS # BLD AUTO: 3.69 X10 (3) UL (ref 1.5–7.7)
NEUTROPHILS # BLD AUTO: 3.69 X10(3) UL (ref 1.5–7.7)
NEUTROPHILS NFR BLD AUTO: 47 %
OSMOLALITY SERPL CALC.SUM OF ELEC: 298 MOSM/KG (ref 275–295)
PLATELET # BLD AUTO: 251 10(3)UL (ref 150–450)
POTASSIUM SERPL-SCNC: 4.3 MMOL/L (ref 3.5–5.1)
PROT SERPL-MCNC: 7.5 G/DL (ref 6.4–8.2)
RBC # BLD AUTO: 5.4 X10(6)UL
SODIUM SERPL-SCNC: 139 MMOL/L (ref 136–145)
TROPONIN I HIGH SENSITIVITY: 350 NG/L
WBC # BLD AUTO: 7.8 X10(3) UL (ref 4–11)

## 2022-03-07 PROCEDURE — 96375 TX/PRO/DX INJ NEW DRUG ADDON: CPT

## 2022-03-07 PROCEDURE — 99285 EMERGENCY DEPT VISIT HI MDM: CPT

## 2022-03-07 PROCEDURE — 84484 ASSAY OF TROPONIN QUANT: CPT | Performed by: EMERGENCY MEDICINE

## 2022-03-07 PROCEDURE — 96376 TX/PRO/DX INJ SAME DRUG ADON: CPT

## 2022-03-07 PROCEDURE — 80053 COMPREHEN METABOLIC PANEL: CPT | Performed by: EMERGENCY MEDICINE

## 2022-03-07 PROCEDURE — 71045 X-RAY EXAM CHEST 1 VIEW: CPT | Performed by: EMERGENCY MEDICINE

## 2022-03-07 PROCEDURE — 74176 CT ABD & PELVIS W/O CONTRAST: CPT | Performed by: EMERGENCY MEDICINE

## 2022-03-07 PROCEDURE — 96374 THER/PROPH/DIAG INJ IV PUSH: CPT

## 2022-03-07 PROCEDURE — 93010 ELECTROCARDIOGRAM REPORT: CPT

## 2022-03-07 PROCEDURE — 93005 ELECTROCARDIOGRAM TRACING: CPT

## 2022-03-07 PROCEDURE — 83690 ASSAY OF LIPASE: CPT | Performed by: EMERGENCY MEDICINE

## 2022-03-07 PROCEDURE — 85025 COMPLETE CBC W/AUTO DIFF WBC: CPT | Performed by: EMERGENCY MEDICINE

## 2022-03-07 RX ORDER — DICYCLOMINE HCL 20 MG
20 TABLET ORAL 4 TIMES DAILY PRN
Qty: 30 TABLET | Refills: 0 | Status: SHIPPED | OUTPATIENT
Start: 2022-03-07 | End: 2022-04-06

## 2022-03-07 RX ORDER — ONDANSETRON 2 MG/ML
4 INJECTION INTRAMUSCULAR; INTRAVENOUS ONCE
Status: COMPLETED | OUTPATIENT
Start: 2022-03-07 | End: 2022-03-07

## 2022-03-07 RX ORDER — ONDANSETRON 4 MG/1
4 TABLET, ORALLY DISINTEGRATING ORAL EVERY 4 HOURS PRN
Qty: 10 TABLET | Refills: 0 | Status: SHIPPED | OUTPATIENT
Start: 2022-03-07 | End: 2022-03-14

## 2022-03-07 RX ORDER — LABETALOL HYDROCHLORIDE 5 MG/ML
10 INJECTION, SOLUTION INTRAVENOUS ONCE
Status: COMPLETED | OUTPATIENT
Start: 2022-03-07 | End: 2022-03-07

## 2022-03-07 RX ORDER — MORPHINE SULFATE 4 MG/ML
4 INJECTION, SOLUTION INTRAMUSCULAR; INTRAVENOUS ONCE
Status: COMPLETED | OUTPATIENT
Start: 2022-03-07 | End: 2022-03-07

## 2022-03-08 LAB
ATRIAL RATE: 101 BPM
P AXIS: 47 DEGREES
P-R INTERVAL: 172 MS
Q-T INTERVAL: 366 MS
QRS DURATION: 80 MS
QTC CALCULATION (BEZET): 474 MS
R AXIS: 19 DEGREES
T AXIS: 78 DEGREES
VENTRICULAR RATE: 101 BPM

## 2022-04-16 ENCOUNTER — HOSPITAL ENCOUNTER (INPATIENT)
Facility: HOSPITAL | Age: 42
LOS: 5 days | Discharge: HOME | End: 2022-04-22
Attending: EMERGENCY MEDICINE | Admitting: INTERNAL MEDICINE
Payer: MEDICAID

## 2022-04-16 ENCOUNTER — APPOINTMENT (OUTPATIENT)
Dept: GENERAL RADIOLOGY | Age: 42
End: 2022-04-16
Attending: EMERGENCY MEDICINE
Payer: MEDICAID

## 2022-04-16 ENCOUNTER — APPOINTMENT (OUTPATIENT)
Dept: CT IMAGING | Age: 42
End: 2022-04-16
Attending: EMERGENCY MEDICINE
Payer: MEDICAID

## 2022-04-16 DIAGNOSIS — I16.0 HYPERTENSIVE URGENCY: Primary | ICD-10-CM

## 2022-04-16 DIAGNOSIS — R77.8 ELEVATED TROPONIN: ICD-10-CM

## 2022-04-16 DIAGNOSIS — N18.9 CHRONIC KIDNEY DISEASE, UNSPECIFIED CKD STAGE: ICD-10-CM

## 2022-04-16 DIAGNOSIS — J06.9 UPPER RESPIRATORY TRACT INFECTION, UNSPECIFIED TYPE: ICD-10-CM

## 2022-04-16 LAB
ALBUMIN SERPL-MCNC: 3 G/DL (ref 3.4–5)
ALBUMIN/GLOB SERPL: 0.8 {RATIO} (ref 1–2)
ALP LIVER SERPL-CCNC: 94 U/L
ALT SERPL-CCNC: 23 U/L
ANION GAP SERPL CALC-SCNC: 8 MMOL/L (ref 0–18)
AST SERPL-CCNC: 26 U/L (ref 15–37)
BASOPHILS # BLD AUTO: 0.09 X10(3) UL (ref 0–0.2)
BASOPHILS NFR BLD AUTO: 1 %
BILIRUB SERPL-MCNC: 0.5 MG/DL (ref 0.1–2)
BUN BLD-MCNC: 27 MG/DL (ref 7–18)
CALCIUM BLD-MCNC: 8.2 MG/DL (ref 8.5–10.1)
CHLORIDE SERPL-SCNC: 111 MMOL/L (ref 98–112)
CO2 SERPL-SCNC: 21 MMOL/L (ref 21–32)
CREAT BLD-MCNC: 2.93 MG/DL
D DIMER PPP FEU-MCNC: 0.82 UG/ML FEU (ref ?–0.5)
EOSINOPHIL # BLD AUTO: 0.3 X10(3) UL (ref 0–0.7)
EOSINOPHIL NFR BLD AUTO: 3.3 %
ERYTHROCYTE [DISTWIDTH] IN BLOOD BY AUTOMATED COUNT: 13.2 %
GLOBULIN PLAS-MCNC: 3.8 G/DL (ref 2.8–4.4)
GLUCOSE BLD-MCNC: 153 MG/DL (ref 70–99)
HCT VFR BLD AUTO: 43.2 %
HGB BLD-MCNC: 14.7 G/DL
IMM GRANULOCYTES # BLD AUTO: 0.03 X10(3) UL (ref 0–1)
IMM GRANULOCYTES NFR BLD: 0.3 %
LIPASE SERPL-CCNC: 120 U/L (ref 73–393)
LYMPHOCYTES # BLD AUTO: 2.1 X10(3) UL (ref 1–4)
LYMPHOCYTES NFR BLD AUTO: 22.8 %
MCH RBC QN AUTO: 30.1 PG (ref 26–34)
MCHC RBC AUTO-ENTMCNC: 34 G/DL (ref 31–37)
MCV RBC AUTO: 88.5 FL
MONOCYTES # BLD AUTO: 0.86 X10(3) UL (ref 0.1–1)
MONOCYTES NFR BLD AUTO: 9.3 %
NEUTROPHILS # BLD AUTO: 5.82 X10 (3) UL (ref 1.5–7.7)
NEUTROPHILS # BLD AUTO: 5.82 X10(3) UL (ref 1.5–7.7)
NEUTROPHILS NFR BLD AUTO: 63.3 %
NT-PROBNP SERPL-MCNC: 3536 PG/ML (ref ?–125)
OSMOLALITY SERPL CALC.SUM OF ELEC: 298 MOSM/KG (ref 275–295)
PLATELET # BLD AUTO: 231 10(3)UL (ref 150–450)
POTASSIUM SERPL-SCNC: 3.6 MMOL/L (ref 3.5–5.1)
PROT SERPL-MCNC: 6.8 G/DL (ref 6.4–8.2)
RBC # BLD AUTO: 4.88 X10(6)UL
SARS-COV-2 RNA RESP QL NAA+PROBE: NOT DETECTED
SODIUM SERPL-SCNC: 140 MMOL/L (ref 136–145)
TROPONIN I HIGH SENSITIVITY: 503 NG/L
WBC # BLD AUTO: 9.2 X10(3) UL (ref 4–11)

## 2022-04-16 PROCEDURE — 85025 COMPLETE CBC W/AUTO DIFF WBC: CPT

## 2022-04-16 PROCEDURE — 80061 LIPID PANEL: CPT | Performed by: EMERGENCY MEDICINE

## 2022-04-16 PROCEDURE — 96368 THER/DIAG CONCURRENT INF: CPT

## 2022-04-16 PROCEDURE — 85025 COMPLETE CBC W/AUTO DIFF WBC: CPT | Performed by: EMERGENCY MEDICINE

## 2022-04-16 PROCEDURE — 83880 ASSAY OF NATRIURETIC PEPTIDE: CPT | Performed by: EMERGENCY MEDICINE

## 2022-04-16 PROCEDURE — 99292 CRITICAL CARE ADDL 30 MIN: CPT

## 2022-04-16 PROCEDURE — 83690 ASSAY OF LIPASE: CPT | Performed by: EMERGENCY MEDICINE

## 2022-04-16 PROCEDURE — 84484 ASSAY OF TROPONIN QUANT: CPT | Performed by: EMERGENCY MEDICINE

## 2022-04-16 PROCEDURE — 96375 TX/PRO/DX INJ NEW DRUG ADDON: CPT

## 2022-04-16 PROCEDURE — 84484 ASSAY OF TROPONIN QUANT: CPT

## 2022-04-16 PROCEDURE — 85379 FIBRIN DEGRADATION QUANT: CPT | Performed by: EMERGENCY MEDICINE

## 2022-04-16 PROCEDURE — 99285 EMERGENCY DEPT VISIT HI MDM: CPT

## 2022-04-16 PROCEDURE — 71045 X-RAY EXAM CHEST 1 VIEW: CPT | Performed by: EMERGENCY MEDICINE

## 2022-04-16 PROCEDURE — 80053 COMPREHEN METABOLIC PANEL: CPT | Performed by: EMERGENCY MEDICINE

## 2022-04-16 PROCEDURE — 96365 THER/PROPH/DIAG IV INF INIT: CPT

## 2022-04-16 PROCEDURE — 99291 CRITICAL CARE FIRST HOUR: CPT

## 2022-04-16 PROCEDURE — 80053 COMPREHEN METABOLIC PANEL: CPT

## 2022-04-16 PROCEDURE — 93010 ELECTROCARDIOGRAM REPORT: CPT

## 2022-04-16 PROCEDURE — 93005 ELECTROCARDIOGRAM TRACING: CPT

## 2022-04-16 RX ORDER — ENALAPRILAT 2.5 MG/2ML
1.25 INJECTION INTRAVENOUS ONCE
Status: COMPLETED | OUTPATIENT
Start: 2022-04-16 | End: 2022-04-16

## 2022-04-17 ENCOUNTER — APPOINTMENT (OUTPATIENT)
Dept: CV DIAGNOSTICS | Facility: HOSPITAL | Age: 42
End: 2022-04-17
Attending: INTERNAL MEDICINE
Payer: MEDICAID

## 2022-04-17 ENCOUNTER — APPOINTMENT (OUTPATIENT)
Dept: CT IMAGING | Age: 42
End: 2022-04-17
Attending: EMERGENCY MEDICINE
Payer: MEDICAID

## 2022-04-17 PROBLEM — N18.9 CHRONIC KIDNEY DISEASE, UNSPECIFIED CKD STAGE: Status: ACTIVE | Noted: 2022-04-17

## 2022-04-17 PROBLEM — J06.9 UPPER RESPIRATORY TRACT INFECTION, UNSPECIFIED TYPE: Status: ACTIVE | Noted: 2022-04-17

## 2022-04-17 PROBLEM — N17.9 ACUTE KIDNEY INJURY (HCC): Status: ACTIVE | Noted: 2022-04-17

## 2022-04-17 PROBLEM — E87.20 METABOLIC ACIDOSIS: Status: ACTIVE | Noted: 2022-04-17

## 2022-04-17 PROBLEM — R73.9 HYPERGLYCEMIA: Status: ACTIVE | Noted: 2022-04-17

## 2022-04-17 PROBLEM — N17.9 ACUTE KIDNEY INJURY: Status: ACTIVE | Noted: 2022-04-17

## 2022-04-17 PROBLEM — E87.2 METABOLIC ACIDOSIS: Status: ACTIVE | Noted: 2022-04-17

## 2022-04-17 LAB
ADENOVIRUS PCR:: NOT DETECTED
ANION GAP SERPL CALC-SCNC: 11 MMOL/L (ref 0–18)
B PARAPERT DNA SPEC QL NAA+PROBE: NOT DETECTED
B PERT DNA SPEC QL NAA+PROBE: NOT DETECTED
BILIRUB UR QL STRIP.AUTO: NEGATIVE
BUN BLD-MCNC: 29 MG/DL (ref 7–18)
C PNEUM DNA SPEC QL NAA+PROBE: NOT DETECTED
CALCIUM BLD-MCNC: 8.7 MG/DL (ref 8.5–10.1)
CHLORIDE SERPL-SCNC: 106 MMOL/L (ref 98–112)
CHOLEST SERPL-MCNC: 215 MG/DL (ref ?–200)
CLARITY UR REFRACT.AUTO: CLEAR
CO2 SERPL-SCNC: 20 MMOL/L (ref 21–32)
COLOR UR AUTO: YELLOW
CORONAVIRUS 229E PCR:: NOT DETECTED
CORONAVIRUS HKU1 PCR:: NOT DETECTED
CORONAVIRUS NL63 PCR:: NOT DETECTED
CORONAVIRUS OC43 PCR:: NOT DETECTED
CREAT BLD-MCNC: 3.37 MG/DL
CREAT UR-SCNC: 113 MG/DL
FLUAV RNA SPEC QL NAA+PROBE: NOT DETECTED
FLUBV RNA SPEC QL NAA+PROBE: NOT DETECTED
GLUCOSE BLD-MCNC: 221 MG/DL (ref 70–99)
GLUCOSE BLD-MCNC: 254 MG/DL (ref 70–99)
GLUCOSE BLD-MCNC: 262 MG/DL (ref 70–99)
GLUCOSE BLD-MCNC: 312 MG/DL (ref 70–99)
GLUCOSE BLD-MCNC: 385 MG/DL (ref 70–99)
GLUCOSE BLD-MCNC: 389 MG/DL (ref 70–99)
GLUCOSE UR STRIP.AUTO-MCNC: >=500 MG/DL
HDLC SERPL-MCNC: 35 MG/DL (ref 40–59)
KETONES UR STRIP.AUTO-MCNC: NEGATIVE MG/DL
L PNEUMO AG UR QL: NEGATIVE
LDLC SERPL CALC-MCNC: 108 MG/DL (ref ?–100)
LEUKOCYTE ESTERASE UR QL STRIP.AUTO: NEGATIVE
METAPNEUMOVIRUS PCR:: NOT DETECTED
MYCOPLASMA PNEUMONIA PCR:: NOT DETECTED
NITRITE UR QL STRIP.AUTO: NEGATIVE
NONHDLC SERPL-MCNC: 180 MG/DL (ref ?–130)
OSMOLALITY SERPL CALC.SUM OF ELEC: 306 MOSM/KG (ref 275–295)
PARAINFLUENZA 1 PCR:: NOT DETECTED
PARAINFLUENZA 2 PCR:: NOT DETECTED
PARAINFLUENZA 3 PCR:: NOT DETECTED
PARAINFLUENZA 4 PCR:: NOT DETECTED
PH UR STRIP.AUTO: 6 [PH] (ref 5–8)
POCT INFLUENZA A: NEGATIVE
POCT INFLUENZA B: NEGATIVE
POTASSIUM SERPL-SCNC: 3.8 MMOL/L (ref 3.5–5.1)
PROCALCITONIN SERPL-MCNC: 0.27 NG/ML (ref ?–0.16)
PROT UR STRIP.AUTO-MCNC: >=500 MG/DL
RBC UR QL AUTO: NEGATIVE
RHINOVIRUS/ENTERO PCR:: NOT DETECTED
RSV RNA SPEC QL NAA+PROBE: NOT DETECTED
SARS-COV-2 RNA NPH QL NAA+NON-PROBE: NOT DETECTED
SODIUM SERPL-SCNC: 137 MMOL/L (ref 136–145)
SODIUM SERPL-SCNC: 17 MMOL/L
SP GR UR STRIP.AUTO: 1.03 (ref 1–1.03)
STREP PNEUMO ANTIGEN, URINE: NEGATIVE
TRIGL SERPL-MCNC: 416 MG/DL (ref 30–149)
TROPONIN I HIGH SENSITIVITY: 361 NG/L
UROBILINOGEN UR STRIP.AUTO-MCNC: <2 MG/DL
UUN UR-MCNC: 590 MG/DL
VLDLC SERPL CALC-MCNC: 73 MG/DL (ref 0–30)

## 2022-04-17 PROCEDURE — 87502 INFLUENZA DNA AMP PROBE: CPT | Performed by: EMERGENCY MEDICINE

## 2022-04-17 PROCEDURE — 82962 GLUCOSE BLOOD TEST: CPT

## 2022-04-17 PROCEDURE — 87637 SARSCOV2&INF A&B&RSV AMP PRB: CPT | Performed by: EMERGENCY MEDICINE

## 2022-04-17 PROCEDURE — 0202U NFCT DS 22 TRGT SARS-COV-2: CPT | Performed by: INTERNAL MEDICINE

## 2022-04-17 PROCEDURE — 80048 BASIC METABOLIC PNL TOTAL CA: CPT | Performed by: INTERNAL MEDICINE

## 2022-04-17 PROCEDURE — 84300 ASSAY OF URINE SODIUM: CPT | Performed by: INTERNAL MEDICINE

## 2022-04-17 PROCEDURE — 87449 NOS EACH ORGANISM AG IA: CPT | Performed by: INTERNAL MEDICINE

## 2022-04-17 PROCEDURE — 84540 ASSAY OF URINE/UREA-N: CPT | Performed by: INTERNAL MEDICINE

## 2022-04-17 PROCEDURE — 84484 ASSAY OF TROPONIN QUANT: CPT | Performed by: INTERNAL MEDICINE

## 2022-04-17 PROCEDURE — 81001 URINALYSIS AUTO W/SCOPE: CPT | Performed by: INTERNAL MEDICINE

## 2022-04-17 PROCEDURE — 84145 PROCALCITONIN (PCT): CPT | Performed by: INTERNAL MEDICINE

## 2022-04-17 PROCEDURE — 74177 CT ABD & PELVIS W/CONTRAST: CPT | Performed by: EMERGENCY MEDICINE

## 2022-04-17 PROCEDURE — 93306 TTE W/DOPPLER COMPLETE: CPT | Performed by: INTERNAL MEDICINE

## 2022-04-17 PROCEDURE — 94644 CONT INHLJ TX 1ST HOUR: CPT

## 2022-04-17 PROCEDURE — 71275 CT ANGIOGRAPHY CHEST: CPT | Performed by: EMERGENCY MEDICINE

## 2022-04-17 PROCEDURE — 82570 ASSAY OF URINE CREATININE: CPT | Performed by: INTERNAL MEDICINE

## 2022-04-17 RX ORDER — LAMOTRIGINE 150 MG/1
150 TABLET ORAL DAILY
Status: DISCONTINUED | OUTPATIENT
Start: 2022-04-17 | End: 2022-04-22

## 2022-04-17 RX ORDER — NIFEDIPINE 30 MG/1
60 TABLET, EXTENDED RELEASE ORAL DAILY
Status: DISCONTINUED | OUTPATIENT
Start: 2022-04-17 | End: 2022-04-22

## 2022-04-17 RX ORDER — IOHEXOL 350 MG/ML
100 INJECTION, SOLUTION INTRAVENOUS
Status: COMPLETED | OUTPATIENT
Start: 2022-04-17 | End: 2022-04-17

## 2022-04-17 RX ORDER — CLONIDINE 0.3 MG/24H
1 PATCH, EXTENDED RELEASE TRANSDERMAL WEEKLY
Status: DISCONTINUED | OUTPATIENT
Start: 2022-04-17 | End: 2022-04-22

## 2022-04-17 RX ORDER — FLUOXETINE 10 MG/1
50 TABLET, FILM COATED ORAL DAILY
Status: DISCONTINUED | OUTPATIENT
Start: 2022-04-17 | End: 2022-04-22

## 2022-04-17 RX ORDER — TRAMADOL HYDROCHLORIDE 50 MG/1
50 TABLET ORAL EVERY 6 HOURS PRN
Status: DISCONTINUED | OUTPATIENT
Start: 2022-04-17 | End: 2022-04-17

## 2022-04-17 RX ORDER — METHYLPREDNISOLONE SODIUM SUCCINATE 125 MG/2ML
125 INJECTION, POWDER, LYOPHILIZED, FOR SOLUTION INTRAMUSCULAR; INTRAVENOUS ONCE
Status: COMPLETED | OUTPATIENT
Start: 2022-04-17 | End: 2022-04-17

## 2022-04-17 RX ORDER — HYDRALAZINE HYDROCHLORIDE 50 MG/1
100 TABLET, FILM COATED ORAL 3 TIMES DAILY
Status: DISCONTINUED | OUTPATIENT
Start: 2022-04-17 | End: 2022-04-20

## 2022-04-17 RX ORDER — HYDRALAZINE HYDROCHLORIDE 20 MG/ML
10 INJECTION INTRAMUSCULAR; INTRAVENOUS
Status: DISCONTINUED | OUTPATIENT
Start: 2022-04-17 | End: 2022-04-22

## 2022-04-17 RX ORDER — METHYLPREDNISOLONE SODIUM SUCCINATE 125 MG/2ML
INJECTION, POWDER, LYOPHILIZED, FOR SOLUTION INTRAMUSCULAR; INTRAVENOUS
Status: COMPLETED
Start: 2022-04-17 | End: 2022-04-17

## 2022-04-17 RX ORDER — SODIUM BICARBONATE 325 MG/1
650 TABLET ORAL 3 TIMES DAILY
Status: DISCONTINUED | OUTPATIENT
Start: 2022-04-17 | End: 2022-04-19

## 2022-04-17 RX ORDER — LABETALOL HYDROCHLORIDE 5 MG/ML
10 INJECTION, SOLUTION INTRAVENOUS ONCE
Status: COMPLETED | OUTPATIENT
Start: 2022-04-17 | End: 2022-04-17

## 2022-04-17 RX ORDER — HEPARIN SODIUM 5000 [USP'U]/ML
5000 INJECTION, SOLUTION INTRAVENOUS; SUBCUTANEOUS EVERY 8 HOURS SCHEDULED
Status: DISCONTINUED | OUTPATIENT
Start: 2022-04-17 | End: 2022-04-22

## 2022-04-17 RX ORDER — FUROSEMIDE 10 MG/ML
40 INJECTION INTRAMUSCULAR; INTRAVENOUS ONCE
Status: COMPLETED | OUTPATIENT
Start: 2022-04-17 | End: 2022-04-17

## 2022-04-17 RX ORDER — FENOFIBRATE 134 MG/1
134 CAPSULE ORAL NIGHTLY
Status: DISCONTINUED | OUTPATIENT
Start: 2022-04-17 | End: 2022-04-22

## 2022-04-17 RX ORDER — ALBUTEROL SULFATE 90 UG/1
2 AEROSOL, METERED RESPIRATORY (INHALATION) EVERY 4 HOURS PRN
Status: DISCONTINUED | OUTPATIENT
Start: 2022-04-17 | End: 2022-04-22

## 2022-04-17 RX ORDER — METHYLPREDNISOLONE SODIUM SUCCINATE 125 MG/2ML
80 INJECTION, POWDER, LYOPHILIZED, FOR SOLUTION INTRAMUSCULAR; INTRAVENOUS EVERY 12 HOURS
Status: DISCONTINUED | OUTPATIENT
Start: 2022-04-17 | End: 2022-04-18

## 2022-04-17 RX ORDER — ARIPIPRAZOLE 5 MG/1
5 TABLET ORAL DAILY
Status: DISCONTINUED | OUTPATIENT
Start: 2022-04-17 | End: 2022-04-22

## 2022-04-17 NOTE — PROGRESS NOTES
[facility] Pharmacy Note:  Renal Dose Adjustment for Tramadol Coltisabelle Orourke    Stephane Ramos has been prescribed Tramadol (ULTRAM) 50 mg orally every 6 hours as needed for pain. Estimated Creatinine Clearance: 37.4 mL/min (A) (based on SCr of 2.93 mg/dL (H)). His calculated creatinine clearance is < 30 ml/min, therefore, the dose of Tramadol (ULTRAM) has been changed to 50 mg every 12 hours as needed for pain per P&T approved protocol. Pharmacy will continue to follow, and if renal function improves, will resume the original order.      Thank you,  Logan Stubbs, [facility]  4/17/2022 5:34 AM

## 2022-04-17 NOTE — PLAN OF CARE
Arrived by ambulance around 0400. SOB at rest on admission, improved since arrival, 02 7L/NC. Cardene gtt at 7.5 mg/hr infusing, SBP 160s. Notified pt's mother of arrival.  Paged hospitalist for orders and cardiology. Asleep at this time. ST HR 110s.

## 2022-04-17 NOTE — IMAGING NOTE
CT TECHNOLOGIST CALLED RADIOLOGIST DUE TO PATIENTS GFR RECOMMEND ONE LITER BEFORE AND AFTER EXAM TO HELP FLUSH KIDNEYS. EMERGENCY DOCTOR AND NURSE NOTIFIED AND GAVE FLUIDS BEFORE AND AFTER INJECTION OF CONTRAST.

## 2022-04-18 LAB
ALBUMIN SERPL-MCNC: 2.9 G/DL (ref 3.4–5)
ALBUMIN/GLOB SERPL: 0.7 {RATIO} (ref 1–2)
ALP LIVER SERPL-CCNC: 88 U/L
ALT SERPL-CCNC: 19 U/L
ANION GAP SERPL CALC-SCNC: 8 MMOL/L (ref 0–18)
AST SERPL-CCNC: 9 U/L (ref 15–37)
ATRIAL RATE: 93 BPM
BASOPHILS # BLD AUTO: 0.04 X10(3) UL (ref 0–0.2)
BASOPHILS NFR BLD AUTO: 0.2 %
BILIRUB SERPL-MCNC: 0.4 MG/DL (ref 0.1–2)
BUN BLD-MCNC: 38 MG/DL (ref 7–18)
CALCIUM BLD-MCNC: 9 MG/DL (ref 8.5–10.1)
CHLORIDE SERPL-SCNC: 110 MMOL/L (ref 98–112)
CO2 SERPL-SCNC: 22 MMOL/L (ref 21–32)
CREAT BLD-MCNC: 2.85 MG/DL
EOSINOPHIL # BLD AUTO: 0 X10(3) UL (ref 0–0.7)
EOSINOPHIL NFR BLD AUTO: 0 %
ERYTHROCYTE [DISTWIDTH] IN BLOOD BY AUTOMATED COUNT: 13.2 %
FLUAV + FLUBV RNA SPEC NAA+PROBE: NOT DETECTED
FLUAV + FLUBV RNA SPEC NAA+PROBE: NOT DETECTED
GLOBULIN PLAS-MCNC: 3.9 G/DL (ref 2.8–4.4)
GLUCOSE BLD-MCNC: 177 MG/DL (ref 70–99)
GLUCOSE BLD-MCNC: 193 MG/DL (ref 70–99)
GLUCOSE BLD-MCNC: 217 MG/DL (ref 70–99)
GLUCOSE BLD-MCNC: 231 MG/DL (ref 70–99)
GLUCOSE BLD-MCNC: 245 MG/DL (ref 70–99)
HCT VFR BLD AUTO: 41.2 %
HGB BLD-MCNC: 14 G/DL
IMM GRANULOCYTES # BLD AUTO: 0.19 X10(3) UL (ref 0–1)
IMM GRANULOCYTES NFR BLD: 0.8 %
LYMPHOCYTES # BLD AUTO: 1.29 X10(3) UL (ref 1–4)
LYMPHOCYTES NFR BLD AUTO: 5.6 %
MCH RBC QN AUTO: 31 PG (ref 26–34)
MCHC RBC AUTO-ENTMCNC: 34 G/DL (ref 31–37)
MCV RBC AUTO: 91.4 FL
MONOCYTES # BLD AUTO: 0.52 X10(3) UL (ref 0.1–1)
MONOCYTES NFR BLD AUTO: 2.3 %
NEUTROPHILS # BLD AUTO: 20.98 X10 (3) UL (ref 1.5–7.7)
NEUTROPHILS # BLD AUTO: 20.98 X10(3) UL (ref 1.5–7.7)
NEUTROPHILS NFR BLD AUTO: 91.1 %
OSMOLALITY SERPL CALC.SUM OF ELEC: 307 MOSM/KG (ref 275–295)
P AXIS: 33 DEGREES
P-R INTERVAL: 182 MS
PLATELET # BLD AUTO: 245 10(3)UL (ref 150–450)
POTASSIUM SERPL-SCNC: 3.8 MMOL/L (ref 3.5–5.1)
PROT SERPL-MCNC: 6.8 G/DL (ref 6.4–8.2)
Q-T INTERVAL: 398 MS
QRS DURATION: 84 MS
QTC CALCULATION (BEZET): 494 MS
R AXIS: 17 DEGREES
RBC # BLD AUTO: 4.51 X10(6)UL
RSV RNA SPEC NAA+PROBE: NOT DETECTED
SARS-COV-2 RNA RESP QL NAA+PROBE: NOT DETECTED
SODIUM SERPL-SCNC: 140 MMOL/L (ref 136–145)
T AXIS: 106 DEGREES
VENTRICULAR RATE: 93 BPM
WBC # BLD AUTO: 23 X10(3) UL (ref 4–11)

## 2022-04-18 PROCEDURE — 82962 GLUCOSE BLOOD TEST: CPT

## 2022-04-18 PROCEDURE — 80053 COMPREHEN METABOLIC PANEL: CPT | Performed by: INTERNAL MEDICINE

## 2022-04-18 PROCEDURE — 94660 CPAP INITIATION&MGMT: CPT

## 2022-04-18 PROCEDURE — 85025 COMPLETE CBC W/AUTO DIFF WBC: CPT | Performed by: INTERNAL MEDICINE

## 2022-04-18 RX ORDER — DOCUSATE SODIUM 100 MG/1
100 CAPSULE, LIQUID FILLED ORAL 2 TIMES DAILY
Status: DISCONTINUED | OUTPATIENT
Start: 2022-04-18 | End: 2022-04-22

## 2022-04-18 RX ORDER — SODIUM PHOSPHATE, DIBASIC AND SODIUM PHOSPHATE, MONOBASIC 7; 19 G/133ML; G/133ML
1 ENEMA RECTAL ONCE AS NEEDED
Status: DISCONTINUED | OUTPATIENT
Start: 2022-04-18 | End: 2022-04-22

## 2022-04-18 RX ORDER — BISACODYL 10 MG
10 SUPPOSITORY, RECTAL RECTAL
Status: DISCONTINUED | OUTPATIENT
Start: 2022-04-18 | End: 2022-04-22

## 2022-04-18 RX ORDER — TRAMADOL HYDROCHLORIDE 50 MG/1
50 TABLET ORAL EVERY 6 HOURS PRN
Status: DISCONTINUED | OUTPATIENT
Start: 2022-04-18 | End: 2022-04-22

## 2022-04-18 RX ORDER — CARVEDILOL 12.5 MG/1
12.5 TABLET ORAL 2 TIMES DAILY WITH MEALS
Status: DISCONTINUED | OUTPATIENT
Start: 2022-04-18 | End: 2022-04-22

## 2022-04-18 RX ORDER — PREDNISONE 20 MG/1
40 TABLET ORAL
Status: COMPLETED | OUTPATIENT
Start: 2022-04-18 | End: 2022-04-21

## 2022-04-18 RX ORDER — POLYETHYLENE GLYCOL 3350 17 G/17G
17 POWDER, FOR SOLUTION ORAL DAILY PRN
Status: DISCONTINUED | OUTPATIENT
Start: 2022-04-18 | End: 2022-04-22

## 2022-04-18 NOTE — PLAN OF CARE
Assumed care of pt. A & O x4. VSS - NSR. SBP goal to maintain <160, PRN hydralazine given. KONG. C/o some pain, denies medication. Tolerating PO intake. Voiding. Will continue to monitor closely.

## 2022-04-18 NOTE — PLAN OF CARE
Received patient at 07:30 awake and alert sitting up in chair. Complaint of neck pain 7 on scale medicated with tramadol. Complaints of generalized weakness. Prn bp medications. Watch diabetes video. Updated plan of care. Problem: Diabetes/Glucose Control  Goal: Glucose maintained within prescribed range  Description: INTERVENTIONS:  - Monitor Blood Glucose as ordered  - Assess for signs and symptoms of hyperglycemia and hypoglycemia  - Administer ordered medications to maintain glucose within target range  - Assess barriers to adequate nutritional intake and initiate nutrition consult as needed  - Instruct patient on self management of diabetes  Outcome: Progressing     Problem: CARDIOVASCULAR - ADULT  Goal: Maintains optimal cardiac output and hemodynamic stability  Description: INTERVENTIONS:  - Monitor vital signs, rhythm, and trends  - Monitor for bleeding, hypotension and signs of decreased cardiac output  - Evaluate effectiveness of vasoactive medications to optimize hemodynamic stability  - Monitor arterial and/or venous puncture sites for bleeding and/or hematoma  - Assess quality of pulses, skin color and temperature  - Assess for signs of decreased coronary artery perfusion - ex.  Angina  - Evaluate fluid balance, assess for edema, trend weights  Outcome: Progressing

## 2022-04-19 ENCOUNTER — APPOINTMENT (OUTPATIENT)
Dept: GENERAL RADIOLOGY | Facility: HOSPITAL | Age: 42
End: 2022-04-19
Attending: INTERNAL MEDICINE
Payer: MEDICAID

## 2022-04-19 LAB
ALBUMIN SERPL-MCNC: 2.7 G/DL (ref 3.4–5)
ANION GAP SERPL CALC-SCNC: 8 MMOL/L (ref 0–18)
BUN BLD-MCNC: 48 MG/DL (ref 7–18)
CALCIUM BLD-MCNC: 8.6 MG/DL (ref 8.5–10.1)
CHLORIDE SERPL-SCNC: 110 MMOL/L (ref 98–112)
CO2 SERPL-SCNC: 23 MMOL/L (ref 21–32)
CREAT BLD-MCNC: 2.83 MG/DL
GLUCOSE BLD-MCNC: 114 MG/DL (ref 70–99)
GLUCOSE BLD-MCNC: 126 MG/DL (ref 70–99)
GLUCOSE BLD-MCNC: 149 MG/DL (ref 70–99)
GLUCOSE BLD-MCNC: 161 MG/DL (ref 70–99)
GLUCOSE BLD-MCNC: 255 MG/DL (ref 70–99)
OSMOLALITY SERPL CALC.SUM OF ELEC: 306 MOSM/KG (ref 275–295)
PHOSPHATE SERPL-MCNC: 3 MG/DL (ref 2.5–4.9)
POTASSIUM SERPL-SCNC: 3.7 MMOL/L (ref 3.5–5.1)
PTH-INTACT SERPL-MCNC: 123.9 PG/ML (ref 18.5–88)
SODIUM SERPL-SCNC: 141 MMOL/L (ref 136–145)
VIT D+METAB SERPL-MCNC: 14.6 NG/ML (ref 30–100)

## 2022-04-19 PROCEDURE — 80069 RENAL FUNCTION PANEL: CPT | Performed by: INTERNAL MEDICINE

## 2022-04-19 PROCEDURE — 94640 AIRWAY INHALATION TREATMENT: CPT

## 2022-04-19 PROCEDURE — 82962 GLUCOSE BLOOD TEST: CPT

## 2022-04-19 PROCEDURE — 82306 VITAMIN D 25 HYDROXY: CPT | Performed by: INTERNAL MEDICINE

## 2022-04-19 PROCEDURE — 87205 SMEAR GRAM STAIN: CPT | Performed by: INTERNAL MEDICINE

## 2022-04-19 PROCEDURE — 83835 ASSAY OF METANEPHRINES: CPT | Performed by: INTERNAL MEDICINE

## 2022-04-19 PROCEDURE — 87070 CULTURE OTHR SPECIMN AEROBIC: CPT | Performed by: INTERNAL MEDICINE

## 2022-04-19 PROCEDURE — 83970 ASSAY OF PARATHORMONE: CPT | Performed by: INTERNAL MEDICINE

## 2022-04-19 PROCEDURE — 71045 X-RAY EXAM CHEST 1 VIEW: CPT | Performed by: INTERNAL MEDICINE

## 2022-04-19 RX ORDER — SODIUM BICARBONATE 325 MG/1
650 TABLET ORAL 2 TIMES DAILY
Status: DISCONTINUED | OUTPATIENT
Start: 2022-04-19 | End: 2022-04-20

## 2022-04-19 RX ORDER — ALBUTEROL SULFATE 2.5 MG/3ML
2.5 SOLUTION RESPIRATORY (INHALATION) ONCE
Status: COMPLETED | OUTPATIENT
Start: 2022-04-19 | End: 2022-04-19

## 2022-04-19 RX ORDER — ERGOCALCIFEROL 1.25 MG/1
50000 CAPSULE ORAL
Status: DISCONTINUED | OUTPATIENT
Start: 2022-04-19 | End: 2022-04-22

## 2022-04-19 RX ORDER — FUROSEMIDE 10 MG/ML
40 INJECTION INTRAMUSCULAR; INTRAVENOUS ONCE
Status: COMPLETED | OUTPATIENT
Start: 2022-04-19 | End: 2022-04-19

## 2022-04-19 NOTE — PLAN OF CARE
Assumed care at [address]. AOx4. C/o generalized discomfort. Tramadol given for pain management. Continue on 24 hours urine. Up with minimal assistance. Plan of care discussed with pt. Continue to monitor.

## 2022-04-20 LAB
ALBUMIN SERPL-MCNC: 2.8 G/DL (ref 3.4–5)
ANION GAP SERPL CALC-SCNC: 6 MMOL/L (ref 0–18)
BUN BLD-MCNC: 44 MG/DL (ref 7–18)
CALCIUM BLD-MCNC: 8.7 MG/DL (ref 8.5–10.1)
CHLORIDE SERPL-SCNC: 109 MMOL/L (ref 98–112)
CO2 SERPL-SCNC: 26 MMOL/L (ref 21–32)
CREAT BLD-MCNC: 2.75 MG/DL
GLUCOSE BLD-MCNC: 107 MG/DL (ref 70–99)
GLUCOSE BLD-MCNC: 110 MG/DL (ref 70–99)
GLUCOSE BLD-MCNC: 167 MG/DL (ref 70–99)
GLUCOSE BLD-MCNC: 197 MG/DL (ref 70–99)
GLUCOSE BLD-MCNC: 209 MG/DL (ref 70–99)
OSMOLALITY SERPL CALC.SUM OF ELEC: 304 MOSM/KG (ref 275–295)
PHOSPHATE SERPL-MCNC: 3.2 MG/DL (ref 2.5–4.9)
POTASSIUM SERPL-SCNC: 3.4 MMOL/L (ref 3.5–5.1)
PROCALCITONIN SERPL-MCNC: 0.21 NG/ML (ref ?–0.16)
SODIUM SERPL-SCNC: 141 MMOL/L (ref 136–145)

## 2022-04-20 PROCEDURE — 82962 GLUCOSE BLOOD TEST: CPT

## 2022-04-20 PROCEDURE — 84145 PROCALCITONIN (PCT): CPT | Performed by: INTERNAL MEDICINE

## 2022-04-20 PROCEDURE — 80069 RENAL FUNCTION PANEL: CPT | Performed by: INTERNAL MEDICINE

## 2022-04-20 RX ORDER — EPLERENONE 25 MG/1
25 TABLET, FILM COATED ORAL DAILY
Status: DISCONTINUED | OUTPATIENT
Start: 2022-04-20 | End: 2022-04-21

## 2022-04-20 RX ORDER — BENZONATATE 100 MG/1
100 CAPSULE ORAL 3 TIMES DAILY PRN
Status: DISCONTINUED | OUTPATIENT
Start: 2022-04-20 | End: 2022-04-22

## 2022-04-20 RX ORDER — HYDRALAZINE HYDROCHLORIDE 50 MG/1
50 TABLET, FILM COATED ORAL 3 TIMES DAILY
Status: DISCONTINUED | OUTPATIENT
Start: 2022-04-20 | End: 2022-04-22

## 2022-04-20 RX ORDER — GUAIFENESIN 600 MG
600 TABLET, EXTENDED RELEASE 12 HR ORAL 2 TIMES DAILY
Status: DISCONTINUED | OUTPATIENT
Start: 2022-04-20 | End: 2022-04-22

## 2022-04-20 NOTE — PLAN OF CARE
Pt is Aox4. Lung sounds diminished and crackles noted bilaterally. Currently on 6L HF NC, unable to wean at this time rt to sats in low 90s. Pt reports feeling sob. Declining cpap use this evening. Telemetry monitoring, in NSR, denies chest pain. Goal BP <160, PRNs available. Pt is continent of bowel and bladder, voids in urinal. Increased frequency rt one time lasix dose given per transferring RN. Chronic back and neck pain, prn tramadol available. Pt is up w/ SBA, updated on plan of care, call light within reach     Poc   QID   Wean O2 as tolerated           Problem: Diabetes/Glucose Control  Goal: Glucose maintained within prescribed range  Description: INTERVENTIONS:  - Monitor Blood Glucose as ordered  - Assess for signs and symptoms of hyperglycemia and hypoglycemia  - Administer ordered medications to maintain glucose within target range  - Assess barriers to adequate nutritional intake and initiate nutrition consult as needed  - Instruct patient on self management of diabetes  Outcome: Progressing     Problem: CARDIOVASCULAR - ADULT  Goal: Maintains optimal cardiac output and hemodynamic stability  Description: INTERVENTIONS:  - Monitor vital signs, rhythm, and trends  - Monitor for bleeding, hypotension and signs of decreased cardiac output  - Evaluate effectiveness of vasoactive medications to optimize hemodynamic stability  - Monitor arterial and/or venous puncture sites for bleeding and/or hematoma  - Assess quality of pulses, skin color and temperature  - Assess for signs of decreased coronary artery perfusion - ex.  Angina  - Evaluate fluid balance, assess for edema, trend weights  Outcome: Progressing     Problem: RESPIRATORY - ADULT  Goal: Achieves optimal ventilation and oxygenation  Description: INTERVENTIONS:  - Assess for changes in respiratory status  - Assess for changes in mentation and behavior  - Position to facilitate oxygenation and minimize respiratory effort  - Oxygen supplementation based on oxygen saturation or ABGs  - Provide Smoking Cessation handout, if applicable  - Encourage broncho-pulmonary hygiene including cough, deep breathe, Incentive Spirometry  - Assess the need for suctioning and perform as needed  - Assess and instruct to report SOB or any respiratory difficulty  - Respiratory Therapy support as indicated  - Manage/alleviate anxiety  - Monitor for signs/symptoms of CO2 retention  Outcome: Progressing     Problem: Patient/Family Goals  Goal: Patient/Family Long Term Goal  Description: Patient's Long Term Goal: to go home    Interventions:  - Report new or worsening condition to physician  - Take medications as prescribed   - diet and exercise as recommended   - Attend follow up appointments as recommended   - MD consults     - See additional Care Plan goals for specific interventions  Outcome: Progressing  Goal: Patient/Family Short Term Goal  Description: Patient's Short Term Goal: \"breathe better\"     Interventions:   - IV abx   -pulm consult   -nebs   - IS use promoted   - wean O2 as tolerated   - CXR  - See additional Care Plan goals for specific interventions  Outcome: Progressing

## 2022-04-20 NOTE — PLAN OF CARE
Assumed care at [address]. AOx4. Continue on O2 6L HF. O2 sat on low 90's. Denies any pain. Up in bathroom with assistance. Transfer to Rm 8600. Report given to JASON Hassan. Pt transported with wheelchair with stable condition. All personal belongings with pt.

## 2022-04-20 NOTE — PLAN OF CARE
Pt received at 0730 resting in bed. A&Ox4. On 5L O2 HFNC, will wean as able. Sinus rhythm on the monitor. Tramadol given PRN for chronic back pain. Up with standby assist. Oriented to room and call light. Updated on plan of care. Problem: RESPIRATORY - ADULT  Goal: Achieves optimal ventilation and oxygenation  Description: INTERVENTIONS:  - Assess for changes in respiratory status  - Assess for changes in mentation and behavior  - Position to facilitate oxygenation and minimize respiratory effort  - Oxygen supplementation based on oxygen saturation or ABGs  - Provide Smoking Cessation handout, if applicable  - Encourage broncho-pulmonary hygiene including cough, deep breathe, Incentive Spirometry  - Assess the need for suctioning and perform as needed  - Assess and instruct to report SOB or any respiratory difficulty  - Respiratory Therapy support as indicated  - Manage/alleviate anxiety  - Monitor for signs/symptoms of CO2 retention  Outcome: Progressing     Problem: CARDIOVASCULAR - ADULT  Goal: Maintains optimal cardiac output and hemodynamic stability  Description: INTERVENTIONS:  - Monitor vital signs, rhythm, and trends  - Monitor for bleeding, hypotension and signs of decreased cardiac output  - Evaluate effectiveness of vasoactive medications to optimize hemodynamic stability  - Monitor arterial and/or venous puncture sites for bleeding and/or hematoma  - Assess quality of pulses, skin color and temperature  - Assess for signs of decreased coronary artery perfusion - ex.  Angina  - Evaluate fluid balance, assess for edema, trend weights  Outcome: Progressing

## 2022-04-20 NOTE — PLAN OF CARE
A/Ox4, fatigued. Dyspnea at rest and with activity. Lungs diminished bilat posteriorly. Requiring 6L NC to maintain SPO2>90. CXR results called to Dr. Tremaine Henderson. 1200 increased dyspnea, bilat crackles posteriorly, sputum pink tinged, Wang Grant and Conchita Herring notified. Lasix given with good diuresis, dyspnea improved, unable to wean oxygen. Problem: Diabetes/Glucose Control  Goal: Glucose maintained within prescribed range  Description: INTERVENTIONS:  - Monitor Blood Glucose as ordered  - Assess for signs and symptoms of hyperglycemia and hypoglycemia  - Administer ordered medications to maintain glucose within target range  - Assess barriers to adequate nutritional intake and initiate nutrition consult as needed  - Instruct patient on self management of diabetes  Outcome: Progressing     Problem: CARDIOVASCULAR - ADULT  Goal: Maintains optimal cardiac output and hemodynamic stability  Description: INTERVENTIONS:  - Monitor vital signs, rhythm, and trends  - Monitor for bleeding, hypotension and signs of decreased cardiac output  - Evaluate effectiveness of vasoactive medications to optimize hemodynamic stability  - Monitor arterial and/or venous puncture sites for bleeding and/or hematoma  - Assess quality of pulses, skin color and temperature  - Assess for signs of decreased coronary artery perfusion - ex.  Angina  - Evaluate fluid balance, assess for edema, trend weights  Outcome: Progressing     Problem: RESPIRATORY - ADULT  Goal: Achieves optimal ventilation and oxygenation  Description: INTERVENTIONS:  - Assess for changes in respiratory status  - Assess for changes in mentation and behavior  - Position to facilitate oxygenation and minimize respiratory effort  - Oxygen supplementation based on oxygen saturation or ABGs  - Provide Smoking Cessation handout, if applicable  - Encourage broncho-pulmonary hygiene including cough, deep breathe, Incentive Spirometry  - Assess the need for suctioning and perform as needed  - Assess and instruct to report SOB or any respiratory difficulty  - Respiratory Therapy support as indicated  - Manage/alleviate anxiety  - Monitor for signs/symptoms of CO2 retention  Outcome: Progressing

## 2022-04-21 LAB
ALBUMIN SERPL-MCNC: 2.7 G/DL (ref 3.4–5)
ANION GAP SERPL CALC-SCNC: 5 MMOL/L (ref 0–18)
BUN BLD-MCNC: 39 MG/DL (ref 7–18)
CALCIUM BLD-MCNC: 8.6 MG/DL (ref 8.5–10.1)
CHLORIDE SERPL-SCNC: 112 MMOL/L (ref 98–112)
CO2 SERPL-SCNC: 24 MMOL/L (ref 21–32)
CREAT BLD-MCNC: 2.58 MG/DL
GLUCOSE BLD-MCNC: 157 MG/DL (ref 70–99)
GLUCOSE BLD-MCNC: 230 MG/DL (ref 70–99)
GLUCOSE BLD-MCNC: 79 MG/DL (ref 70–99)
GLUCOSE BLD-MCNC: 84 MG/DL (ref 70–99)
OSMOLALITY SERPL CALC.SUM OF ELEC: 300 MOSM/KG (ref 275–295)
PHOSPHATE SERPL-MCNC: 2.9 MG/DL (ref 2.5–4.9)
POTASSIUM SERPL-SCNC: 3.6 MMOL/L (ref 3.5–5.1)
SODIUM SERPL-SCNC: 141 MMOL/L (ref 136–145)

## 2022-04-21 PROCEDURE — 82962 GLUCOSE BLOOD TEST: CPT

## 2022-04-21 PROCEDURE — 80069 RENAL FUNCTION PANEL: CPT | Performed by: INTERNAL MEDICINE

## 2022-04-21 RX ORDER — EPLERENONE 25 MG/1
50 TABLET, FILM COATED ORAL DAILY
Status: DISCONTINUED | OUTPATIENT
Start: 2022-04-22 | End: 2022-04-21

## 2022-04-21 RX ORDER — EPLERENONE 25 MG/1
25 TABLET, FILM COATED ORAL ONCE
Status: COMPLETED | OUTPATIENT
Start: 2022-04-21 | End: 2022-04-21

## 2022-04-21 RX ORDER — EPLERENONE 25 MG/1
50 TABLET, FILM COATED ORAL 2 TIMES DAILY
Status: DISCONTINUED | OUTPATIENT
Start: 2022-04-21 | End: 2022-04-22

## 2022-04-21 NOTE — PLAN OF CARE
Received pt at 1930. A&Ox4. NSR on tele. Currently on RA. No complaints of pain. 1+ edema to BLE. Lungs diminished. QID. States breathing is better than this morning. Plan is IV abx. Pt updated on poc and resting comfortably in bed  Problem: Diabetes/Glucose Control  Goal: Glucose maintained within prescribed range  Description: INTERVENTIONS:  - Monitor Blood Glucose as ordered  - Assess for signs and symptoms of hyperglycemia and hypoglycemia  - Administer ordered medications to maintain glucose within target range  - Assess barriers to adequate nutritional intake and initiate nutrition consult as needed  - Instruct patient on self management of diabetes  Outcome: Progressing     Problem: CARDIOVASCULAR - ADULT  Goal: Maintains optimal cardiac output and hemodynamic stability  Description: INTERVENTIONS:  - Monitor vital signs, rhythm, and trends  - Monitor for bleeding, hypotension and signs of decreased cardiac output  - Evaluate effectiveness of vasoactive medications to optimize hemodynamic stability  - Monitor arterial and/or venous puncture sites for bleeding and/or hematoma  - Assess quality of pulses, skin color and temperature  - Assess for signs of decreased coronary artery perfusion - ex.  Angina  - Evaluate fluid balance, assess for edema, trend weights  Outcome: Progressing     Problem: RESPIRATORY - ADULT  Goal: Achieves optimal ventilation and oxygenation  Description: INTERVENTIONS:  - Assess for changes in respiratory status  - Assess for changes in mentation and behavior  - Position to facilitate oxygenation and minimize respiratory effort  - Oxygen supplementation based on oxygen saturation or ABGs  - Provide Smoking Cessation handout, if applicable  - Encourage broncho-pulmonary hygiene including cough, deep breathe, Incentive Spirometry  - Assess the need for suctioning and perform as needed  - Assess and instruct to report SOB or any respiratory difficulty  - Respiratory Therapy support as indicated  - Manage/alleviate anxiety  - Monitor for signs/symptoms of CO2 retention  Outcome: Progressing     Problem: Patient/Family Goals  Goal: Patient/Family Long Term Goal  Description: Patient's Long Term Goal: to go home    Interventions:  - Report new or worsening condition to physician  - Take medications as prescribed   - diet and exercise as recommended   - Attend follow up appointments as recommended   - MD consults     - See additional Care Plan goals for specific interventions  Outcome: Progressing  Goal: Patient/Family Short Term Goal  Description: Patient's Short Term Goal: \"breathe better\"     Interventions:   - IV abx   -pulm consult   -nebs   - IS use promoted   - wean O2 as tolerated   - CXR  - See additional Care Plan goals for specific interventions  Outcome: Progressing

## 2022-04-21 NOTE — PLAN OF CARE
Assumed care of pt at 0730  A&Ox4, up ad osvaldo with a steady gait, no complaints of pain  R/A, NSR, no chest pain, no shortness of breath, some crackles on R side  1+ pitting edema in bilateral lower extremities  Skin is clean, dry, and intact, no wounds present  Continent of bowel and bladder, last BM 4/19  Fall precautions in place, call light within reach, pt updated on plan of care, will continue to monitor                Problem: Diabetes/Glucose Control  Goal: Glucose maintained within prescribed range  Description: INTERVENTIONS:  - Monitor Blood Glucose as ordered  - Assess for signs and symptoms of hyperglycemia and hypoglycemia  - Administer ordered medications to maintain glucose within target range  - Assess barriers to adequate nutritional intake and initiate nutrition consult as needed  - Instruct patient on self management of diabetes  Outcome: Progressing     Problem: CARDIOVASCULAR - ADULT  Goal: Maintains optimal cardiac output and hemodynamic stability  Description: INTERVENTIONS:  - Monitor vital signs, rhythm, and trends  - Monitor for bleeding, hypotension and signs of decreased cardiac output  - Evaluate effectiveness of vasoactive medications to optimize hemodynamic stability  - Monitor arterial and/or venous puncture sites for bleeding and/or hematoma  - Assess quality of pulses, skin color and temperature  - Assess for signs of decreased coronary artery perfusion - ex.  Angina  - Evaluate fluid balance, assess for edema, trend weights  Outcome: Progressing     Problem: RESPIRATORY - ADULT  Goal: Achieves optimal ventilation and oxygenation  Description: INTERVENTIONS:  - Assess for changes in respiratory status  - Assess for changes in mentation and behavior  - Position to facilitate oxygenation and minimize respiratory effort  - Oxygen supplementation based on oxygen saturation or ABGs  - Provide Smoking Cessation handout, if applicable  - Encourage broncho-pulmonary hygiene including cough, deep breathe, Incentive Spirometry  - Assess the need for suctioning and perform as needed  - Assess and instruct to report SOB or any respiratory difficulty  - Respiratory Therapy support as indicated  - Manage/alleviate anxiety  - Monitor for signs/symptoms of CO2 retention  Outcome: Progressing     Problem: Patient/Family Goals  Goal: Patient/Family Long Term Goal  Description: Patient's Long Term Goal: to go home    Interventions:  - Report new or worsening condition to physician  - Take medications as prescribed   - diet and exercise as recommended   - Attend follow up appointments as recommended   - MD consults     - See additional Care Plan goals for specific interventions  Outcome: Progressing  Goal: Patient/Family Short Term Goal  Description: Patient's Short Term Goal: \"breathe better\"     Interventions:   - IV abx   -pulm consult   -nebs   - IS use promoted   - wean O2 as tolerated   - CXR  - See additional Care Plan goals for specific interventions  Outcome: Progressing

## 2022-04-21 NOTE — PROGRESS NOTES
04/21/22 1400   Mobility   O2 walk?  Yes   SPO2% on Room Air at Rest 98   SPO2% Ambulation on Room Air 94

## 2022-04-22 VITALS
OXYGEN SATURATION: 95 % | RESPIRATION RATE: 18 BRPM | HEART RATE: 77 BPM | SYSTOLIC BLOOD PRESSURE: 137 MMHG | TEMPERATURE: 97 F | DIASTOLIC BLOOD PRESSURE: 89 MMHG | HEIGHT: 74 IN | BODY MASS INDEX: 36.4 KG/M2 | WEIGHT: 283.63 LBS

## 2022-04-22 LAB
ALBUMIN SERPL-MCNC: 2.9 G/DL (ref 3.4–5)
ANION GAP SERPL CALC-SCNC: 6 MMOL/L (ref 0–18)
BUN BLD-MCNC: 40 MG/DL (ref 7–18)
CALCIUM BLD-MCNC: 8.8 MG/DL (ref 8.5–10.1)
CHLORIDE SERPL-SCNC: 110 MMOL/L (ref 98–112)
CO2 SERPL-SCNC: 22 MMOL/L (ref 21–32)
CREAT BLD-MCNC: 2.57 MG/DL
GLUCOSE BLD-MCNC: 101 MG/DL (ref 70–99)
GLUCOSE BLD-MCNC: 107 MG/DL (ref 70–99)
GLUCOSE BLD-MCNC: 116 MG/DL (ref 70–99)
GLUCOSE BLD-MCNC: 83 MG/DL (ref 70–99)
OSMOLALITY SERPL CALC.SUM OF ELEC: 296 MOSM/KG (ref 275–295)
PHOSPHATE SERPL-MCNC: 3.2 MG/DL (ref 2.5–4.9)
PLATELET # BLD AUTO: 264 10(3)UL (ref 150–450)
POTASSIUM SERPL-SCNC: 3.6 MMOL/L (ref 3.5–5.1)
SODIUM SERPL-SCNC: 138 MMOL/L (ref 136–145)

## 2022-04-22 PROCEDURE — 82962 GLUCOSE BLOOD TEST: CPT

## 2022-04-22 PROCEDURE — 80069 RENAL FUNCTION PANEL: CPT | Performed by: INTERNAL MEDICINE

## 2022-04-22 PROCEDURE — 85049 AUTOMATED PLATELET COUNT: CPT | Performed by: INTERNAL MEDICINE

## 2022-04-22 RX ORDER — CEFDINIR 300 MG/1
300 CAPSULE ORAL 2 TIMES DAILY
Qty: 8 CAPSULE | Refills: 0 | Status: SHIPPED | OUTPATIENT
Start: 2022-04-23 | End: 2022-04-27

## 2022-04-22 RX ORDER — NIFEDIPINE 30 MG/1
90 TABLET, EXTENDED RELEASE ORAL DAILY
Status: DISCONTINUED | OUTPATIENT
Start: 2022-04-23 | End: 2022-04-22

## 2022-04-22 RX ORDER — NIFEDIPINE 30 MG/1
30 TABLET, EXTENDED RELEASE ORAL ONCE
Status: COMPLETED | OUTPATIENT
Start: 2022-04-22 | End: 2022-04-22

## 2022-04-22 RX ORDER — CLONIDINE 0.1 MG/24H
1 PATCH, EXTENDED RELEASE TRANSDERMAL WEEKLY
Qty: 4 PATCH | Refills: 0 | Status: SHIPPED | OUTPATIENT
Start: 2022-04-22

## 2022-04-22 RX ORDER — HYDRALAZINE HYDROCHLORIDE 50 MG/1
50 TABLET, FILM COATED ORAL 3 TIMES DAILY
Qty: 90 TABLET | Refills: 1 | Status: SHIPPED | OUTPATIENT
Start: 2022-04-22 | End: 2022-04-22

## 2022-04-22 RX ORDER — ARIPIPRAZOLE 5 MG/1
5 TABLET ORAL DAILY
Qty: 30 TABLET | Refills: 0 | Status: SHIPPED | OUTPATIENT
Start: 2022-04-22

## 2022-04-22 RX ORDER — ERGOCALCIFEROL 1.25 MG/1
50000 CAPSULE ORAL
Qty: 11 CAPSULE | Refills: 0 | Status: SHIPPED | OUTPATIENT
Start: 2022-04-26

## 2022-04-22 RX ORDER — FLUOXETINE 10 MG/1
50 TABLET, FILM COATED ORAL DAILY
Qty: 70 TABLET | Refills: 0 | Status: SHIPPED | OUTPATIENT
Start: 2022-04-23 | End: 2022-05-07

## 2022-04-22 RX ORDER — CEFDINIR 300 MG/1
300 CAPSULE ORAL 2 TIMES DAILY
Status: DISCONTINUED | OUTPATIENT
Start: 2022-04-23 | End: 2022-04-22

## 2022-04-22 RX ORDER — CARVEDILOL 12.5 MG/1
12.5 TABLET ORAL 2 TIMES DAILY WITH MEALS
Qty: 60 TABLET | Refills: 1 | Status: SHIPPED | OUTPATIENT
Start: 2022-04-22

## 2022-04-22 RX ORDER — EPLERENONE 50 MG/1
50 TABLET, FILM COATED ORAL 2 TIMES DAILY
Qty: 60 TABLET | Refills: 1 | Status: SHIPPED | OUTPATIENT
Start: 2022-04-22

## 2022-04-22 RX ORDER — HYDRALAZINE HYDROCHLORIDE 100 MG/1
100 TABLET, FILM COATED ORAL 3 TIMES DAILY
Qty: 90 TABLET | Refills: 1 | Status: SHIPPED | OUTPATIENT
Start: 2022-04-22

## 2022-04-22 RX ORDER — HYDRALAZINE HYDROCHLORIDE 50 MG/1
100 TABLET, FILM COATED ORAL 3 TIMES DAILY
Status: DISCONTINUED | OUTPATIENT
Start: 2022-04-22 | End: 2022-04-22

## 2022-04-22 RX ORDER — NIFEDIPINE 90 MG/1
90 TABLET, FILM COATED, EXTENDED RELEASE ORAL DAILY
Qty: 30 TABLET | Refills: 1 | Status: SHIPPED | OUTPATIENT
Start: 2022-04-23

## 2022-04-22 RX ORDER — ACETAMINOPHEN 500 MG
1000 TABLET ORAL ONCE
Status: DISCONTINUED | OUTPATIENT
Start: 2022-04-22 | End: 2022-04-22

## 2022-04-22 NOTE — PLAN OF CARE
Assumed care of pt at 0730  A&Ox4, up ad osvaldo with a steady gait, no complaints of pain  R/A, NSR, no chest pain, no shortness of breath, BP remains 150s/100s  Skin is clean, dry, intact, no wounds present  Continent of bowel and bladder, last BM 4/22  Fall precautions in place, call light within reach, pt updated on plan of care, will continue to monitor  Plan for possible dc today    Discharge and medication education completed with pt  All questions answered regarding discharge, medications, and follow up appointments  Pt educated on the importance of monitoring blood pressure at home  Pt transported to [facility] by staff for discharge                  Problem: Diabetes/Glucose Control  Goal: Glucose maintained within prescribed range  Description: INTERVENTIONS:  - Monitor Blood Glucose as ordered  - Assess for signs and symptoms of hyperglycemia and hypoglycemia  - Administer ordered medications to maintain glucose within target range  - Assess barriers to adequate nutritional intake and initiate nutrition consult as needed  - Instruct patient on self management of diabetes  Outcome: Progressing     Problem: CARDIOVASCULAR - ADULT  Goal: Maintains optimal cardiac output and hemodynamic stability  Description: INTERVENTIONS:  - Monitor vital signs, rhythm, and trends  - Monitor for bleeding, hypotension and signs of decreased cardiac output  - Evaluate effectiveness of vasoactive medications to optimize hemodynamic stability  - Monitor arterial and/or venous puncture sites for bleeding and/or hematoma  - Assess quality of pulses, skin color and temperature  - Assess for signs of decreased coronary artery perfusion - ex.  Angina  - Evaluate fluid balance, assess for edema, trend weights  Outcome: Progressing     Problem: RESPIRATORY - ADULT  Goal: Achieves optimal ventilation and oxygenation  Description: INTERVENTIONS:  - Assess for changes in respiratory status  - Assess for changes in mentation and behavior  - Position to facilitate oxygenation and minimize respiratory effort  - Oxygen supplementation based on oxygen saturation or ABGs  - Provide Smoking Cessation handout, if applicable  - Encourage broncho-pulmonary hygiene including cough, deep breathe, Incentive Spirometry  - Assess the need for suctioning and perform as needed  - Assess and instruct to report SOB or any respiratory difficulty  - Respiratory Therapy support as indicated  - Manage/alleviate anxiety  - Monitor for signs/symptoms of CO2 retention  Outcome: Progressing     Problem: Patient/Family Goals  Goal: Patient/Family Long Term Goal  Description: Patient's Long Term Goal: to go home    Interventions:  - Report new or worsening condition to physician  - Take medications as prescribed   - diet and exercise as recommended   - Attend follow up appointments as recommended   - MD consults     - See additional Care Plan goals for specific interventions  Outcome: Progressing  Goal: Patient/Family Short Term Goal  Description: Patient's Short Term Goal: \"breathe better\"     Interventions:   - IV abx   -pulm consult   -nebs   - IS use promoted   - wean O2 as tolerated   - CXR  - See additional Care Plan goals for specific interventions  Outcome: Progressing

## 2022-04-22 NOTE — CM/SW NOTE
Script for inspra 50 mg to be taken twice daily. electronically sent to patient's [facility]'s.   To  Call and check cost (phone [phone])--with insurance, NO cost to the patient

## 2022-04-23 LAB
CREATININE, URINE - PER 24H: 2928 MG/D
CREATININE, URINE - PER VOLUME: 122 MG/DL
HOURS COLLECTED: 24 HR
METANEPHRINE, UR- RATIO TO CRT: 61 UG/G CRT
METANEPHRINE, URINE - PER 24H: 178 UG/D
METANEPHRINE, URINE-PER VOLUME: 74 UG/L
NORMETANEPHRINE, UR-PER VOLUME: 801 UG/L
NORMETANEPHRINE, URINE - RATIO: 657 UG/G CRT
NORMETANEPHRINE, URINE-PER 24H: 1922 UG/D
TOTAL VOLUME: 2400 ML

## 2022-04-28 ENCOUNTER — HOSPITAL ENCOUNTER (EMERGENCY)
Age: 42
Discharge: LEFT BEFORE DISCHARGE | End: 2022-04-28
Attending: EMERGENCY MEDICINE
Payer: MEDICAID

## 2022-04-28 VITALS
HEART RATE: 84 BPM | WEIGHT: 280 LBS | HEIGHT: 74 IN | TEMPERATURE: 97 F | OXYGEN SATURATION: 98 % | SYSTOLIC BLOOD PRESSURE: 174 MMHG | BODY MASS INDEX: 35.94 KG/M2 | DIASTOLIC BLOOD PRESSURE: 123 MMHG | RESPIRATION RATE: 16 BRPM

## 2022-04-28 DIAGNOSIS — R03.0 ELEVATED BLOOD PRESSURE READING: ICD-10-CM

## 2022-04-28 DIAGNOSIS — Z76.0 ENCOUNTER FOR MEDICATION REFILL: ICD-10-CM

## 2022-04-28 DIAGNOSIS — Z53.29 LEFT AGAINST MEDICAL ADVICE: Primary | ICD-10-CM

## 2022-04-28 LAB
ATRIAL RATE: 98 BPM
P AXIS: 28 DEGREES
P-R INTERVAL: 158 MS
Q-T INTERVAL: 398 MS
QRS DURATION: 92 MS
QTC CALCULATION (BEZET): 508 MS
R AXIS: 15 DEGREES
T AXIS: 86 DEGREES
VENTRICULAR RATE: 98 BPM

## 2022-04-28 PROCEDURE — 99284 EMERGENCY DEPT VISIT MOD MDM: CPT

## 2022-04-28 PROCEDURE — 93005 ELECTROCARDIOGRAM TRACING: CPT

## 2022-04-28 PROCEDURE — 96374 THER/PROPH/DIAG INJ IV PUSH: CPT

## 2022-04-28 PROCEDURE — 96375 TX/PRO/DX INJ NEW DRUG ADDON: CPT

## 2022-04-28 PROCEDURE — 93010 ELECTROCARDIOGRAM REPORT: CPT

## 2022-04-28 RX ORDER — LABETALOL HYDROCHLORIDE 5 MG/ML
20 INJECTION, SOLUTION INTRAVENOUS ONCE
Status: COMPLETED | OUTPATIENT
Start: 2022-04-28 | End: 2022-04-28

## 2022-04-28 RX ORDER — LAMOTRIGINE 150 MG/1
150 TABLET ORAL DAILY
Qty: 30 TABLET | Refills: 0 | Status: SHIPPED | OUTPATIENT
Start: 2022-04-28

## 2022-04-28 RX ORDER — ACETAMINOPHEN 500 MG
1000 TABLET ORAL ONCE
Status: COMPLETED | OUTPATIENT
Start: 2022-04-28 | End: 2022-04-28

## 2022-04-28 RX ORDER — ARIPIPRAZOLE 5 MG/1
5 TABLET ORAL DAILY
Qty: 30 TABLET | Refills: 0 | Status: SHIPPED | OUTPATIENT
Start: 2022-04-28 | End: 2022-05-28

## 2022-04-28 RX ORDER — HYDRALAZINE HYDROCHLORIDE 20 MG/ML
10 INJECTION INTRAMUSCULAR; INTRAVENOUS ONCE
Status: COMPLETED | OUTPATIENT
Start: 2022-04-28 | End: 2022-04-28

## 2022-04-28 RX ORDER — FLUOXETINE 10 MG/1
50 TABLET, FILM COATED ORAL DAILY
Qty: 70 TABLET | Refills: 0 | Status: SHIPPED | OUTPATIENT
Start: 2022-04-28 | End: 2022-05-12

## 2022-04-28 NOTE — ED QUICK NOTES
Pt refused any work up, he said he just want BP meds to bring down his blood pressure. Pt signed AMA. Informed that when he leave he may become sicker or even die. Verbalized of understanding.

## 2022-04-28 NOTE — ED INITIAL ASSESSMENT (HPI)
Pt states he has new insurance and had to switch PCP and ran out of some medications 2 wks ago, and was told to come to ED for refills. Pt states he needs lamictal, fluoxetine, abilify and vyvance.

## 2022-04-28 NOTE — ED NOTES
I reviewed that chart and discussed the case. I have examined the patient and noted patient is a 68-year-old male with extensive medical history including bipolar, hypertension, asthma, and high cholesterol as well as chronic kidney disease. Patient is here for refills of his medication including Lamictal, fluoxetine, Abilify, and Vyvanse which she ran out of a couple weeks ago. Patient was just recently in the hospital for hypertensive urgency. Patient does present to the emergency room today with elevated blood pressure and some mild headache. Patient states that he has not taken his blood pressure medication this morning and typically his blood pressure medication is this high when he wakes up in the morning. Patient denies history of any chest pain or shortness of breath. The patient denies history of any cough or fever. Patient denies history of any other somatic complaints or discomfort at this time. GENERAL: Well-developed, well-nourished male sitting up breathing easily in no apparent distress. Patient is nontoxic in appearance. HEENT: Head is normocephalic, atraumatic. Pupils are 4 mm equally round and reactive to light. Oropharynx is clear. Mucous membranes are moist.  NECK: There is no focal tenderness to palpation appreciated. There is no JVD. No meningeal signs or nuchal rigidity appreciated. No stridor. LUNGS: Clear to auscultation bilaterally with no wheeze. There is good equal air entry bilaterally. HEART: Regular rate and rhythm. Normal S1, S2 no S3, or S4. No murmur. ABDOMEN: There is no focal tenderness to palpation appreciated anywhere throughout the abdomen. There is no guarding, no rebound, no mass, and no organomegaly appreciated. There is normoactive bowel sounds. There is no hernia. EXTREMITIES: There is no cyanosis, clubbing, or edema appreciated. Pulses are 2+ and equal in all 4 extremities. NEURO: Patient is awake, alert and oriented to time place and person.  Motor strength is 5 over 5 in all 4 extremities. There are no gross motor or sensory deficits appreciated. Cranial nerves II through XII are intact. Patient answering all questions appropriately. EKG shows normal sinus rhythm heart rate of 98 evidence of left ventricular hypertrophy no acute ST elevation appreciated. No change from previous EKG. EKG    Rate, intervals and axes as noted on EKG Report. Rate: 98  Rhythm: Sinus Rhythm  Reading: LVH no acute ST elevation appreciated. I discussed with the patient when he came to the ER that I wanted to do some further testing as concerned about his significantly elevated blood pressure as well as he has headache at this time. The patient states again that his symptoms are identical to what is happened to him when he has not taken his blood pressure medication and he admits he did not take his blood pressure medication this morning. The patient is refusing all testing at this time. He did agree to being given blood pressure medication was given IV hydralazine, IV labetalol, and also oral Tylenol here in the ER. The patient is refusing any other testing at this time and also is refusing admission to the hospital at this time and wants to go home. Patient just wants a refill for his medication. The patient is aware of the risk of leaving at this time including worsening of symptoms or even death. Patient will be discharged home [address] at this time. Patient given prescriptions for his refills at this time instructed to continue his medication for blood pressure as per previous to monitor his blood pressure closely at home and return to the ER if any other problems arise. Patient discharged home [address] at this time. .      I agree with the following clinical impression(s):  Left against medical advice  (primary encounter diagnosis)  Elevated blood pressure reading  Encounter for medication refill.     I agree with the plan as noted.

## 2022-07-10 ENCOUNTER — APPOINTMENT (OUTPATIENT)
Dept: GENERAL RADIOLOGY | Age: 42
End: 2022-07-10
Attending: EMERGENCY MEDICINE
Payer: MEDICAID

## 2022-07-10 ENCOUNTER — APPOINTMENT (OUTPATIENT)
Dept: CT IMAGING | Age: 42
End: 2022-07-10
Attending: EMERGENCY MEDICINE
Payer: MEDICAID

## 2022-07-10 ENCOUNTER — HOSPITAL ENCOUNTER (INPATIENT)
Facility: HOSPITAL | Age: 42
LOS: 2 days | Discharge: HOME | End: 2022-07-12
Attending: EMERGENCY MEDICINE | Admitting: INTERNAL MEDICINE
Payer: MEDICAID

## 2022-07-10 DIAGNOSIS — R06.02 SOB (SHORTNESS OF BREATH): ICD-10-CM

## 2022-07-10 DIAGNOSIS — N18.9 CHRONIC KIDNEY DISEASE, UNSPECIFIED CKD STAGE: ICD-10-CM

## 2022-07-10 DIAGNOSIS — E87.2 METABOLIC ACIDOSIS: ICD-10-CM

## 2022-07-10 DIAGNOSIS — R14.0 ABDOMINAL DISTENSION: Primary | ICD-10-CM

## 2022-07-10 LAB
ALBUMIN SERPL-MCNC: 3.4 G/DL (ref 3.4–5)
ALBUMIN SERPL-MCNC: 3.4 G/DL (ref 3.4–5)
ALBUMIN/GLOB SERPL: 1 {RATIO} (ref 1–2)
ALP LIVER SERPL-CCNC: 115 U/L
ALT SERPL-CCNC: 28 U/L
ANION GAP SERPL CALC-SCNC: 10 MMOL/L (ref 0–18)
ANION GAP SERPL CALC-SCNC: 7 MMOL/L (ref 0–18)
ANION GAP SERPL CALC-SCNC: 7 MMOL/L (ref 0–18)
AST SERPL-CCNC: 16 U/L (ref 15–37)
ATRIAL RATE: 100 BPM
ATRIAL RATE: 107 BPM
BASOPHILS # BLD AUTO: 0.05 X10(3) UL (ref 0–0.2)
BASOPHILS NFR BLD AUTO: 0.6 %
BILIRUB SERPL-MCNC: 0.2 MG/DL (ref 0.1–2)
BILIRUB UR QL STRIP.AUTO: NEGATIVE
BUN BLD-MCNC: 22 MG/DL (ref 7–18)
BUN BLD-MCNC: 22 MG/DL (ref 7–18)
BUN BLD-MCNC: 26 MG/DL (ref 7–18)
CALCIUM BLD-MCNC: 8.6 MG/DL (ref 8.5–10.1)
CALCIUM BLD-MCNC: 8.8 MG/DL (ref 8.5–10.1)
CALCIUM BLD-MCNC: 8.8 MG/DL (ref 8.5–10.1)
CHLORIDE SERPL-SCNC: 110 MMOL/L (ref 98–112)
CHLORIDE SERPL-SCNC: 111 MMOL/L (ref 98–112)
CHLORIDE SERPL-SCNC: 111 MMOL/L (ref 98–112)
CHOLEST SERPL-MCNC: 248 MG/DL (ref ?–200)
CLARITY UR REFRACT.AUTO: CLEAR
CO2 SERPL-SCNC: 19 MMOL/L (ref 21–32)
CO2 SERPL-SCNC: 21 MMOL/L (ref 21–32)
CO2 SERPL-SCNC: 21 MMOL/L (ref 21–32)
COLOR UR AUTO: YELLOW
CREAT BLD-MCNC: 2.78 MG/DL
CREAT BLD-MCNC: 2.78 MG/DL
CREAT BLD-MCNC: 3.22 MG/DL
CREAT UR-SCNC: 123 MG/DL
CREAT UR-SCNC: 123 MG/DL
EOSINOPHIL # BLD AUTO: 0.26 X10(3) UL (ref 0–0.7)
EOSINOPHIL NFR BLD AUTO: 3.3 %
ERYTHROCYTE [DISTWIDTH] IN BLOOD BY AUTOMATED COUNT: 15.2 %
GLOBULIN PLAS-MCNC: 3.4 G/DL (ref 2.8–4.4)
GLUCOSE BLD-MCNC: 112 MG/DL (ref 70–99)
GLUCOSE BLD-MCNC: 147 MG/DL (ref 70–99)
GLUCOSE BLD-MCNC: 152 MG/DL (ref 70–99)
GLUCOSE BLD-MCNC: 170 MG/DL (ref 70–99)
GLUCOSE BLD-MCNC: 176 MG/DL (ref 70–99)
GLUCOSE BLD-MCNC: 176 MG/DL (ref 70–99)
GLUCOSE UR STRIP.AUTO-MCNC: NEGATIVE MG/DL
HCT VFR BLD AUTO: 34.7 %
HDLC SERPL-MCNC: 41 MG/DL (ref 40–59)
HGB BLD-MCNC: 11.4 G/DL
IMM GRANULOCYTES # BLD AUTO: 0.02 X10(3) UL (ref 0–1)
IMM GRANULOCYTES NFR BLD: 0.3 %
KETONES UR STRIP.AUTO-MCNC: NEGATIVE MG/DL
LDLC SERPL CALC-MCNC: 132 MG/DL (ref ?–100)
LEUKOCYTE ESTERASE UR QL STRIP.AUTO: NEGATIVE
LIPASE SERPL-CCNC: 173 U/L (ref 73–393)
LYMPHOCYTES # BLD AUTO: 2.27 X10(3) UL (ref 1–4)
LYMPHOCYTES NFR BLD AUTO: 28.7 %
MAGNESIUM SERPL-MCNC: 1.7 MG/DL (ref 1.6–2.6)
MAGNESIUM SERPL-MCNC: 1.9 MG/DL (ref 1.6–2.6)
MCH RBC QN AUTO: 31.1 PG (ref 26–34)
MCHC RBC AUTO-ENTMCNC: 32.9 G/DL (ref 31–37)
MCV RBC AUTO: 94.6 FL
MICROALBUMIN UR-MCNC: 47.6 MG/DL
MICROALBUMIN/CREAT 24H UR-RTO: 387 UG/MG (ref ?–30)
MONOCYTES # BLD AUTO: 0.73 X10(3) UL (ref 0.1–1)
MONOCYTES NFR BLD AUTO: 9.2 %
NEUTROPHILS # BLD AUTO: 4.59 X10 (3) UL (ref 1.5–7.7)
NEUTROPHILS # BLD AUTO: 4.59 X10(3) UL (ref 1.5–7.7)
NEUTROPHILS NFR BLD AUTO: 57.9 %
NITRITE UR QL STRIP.AUTO: NEGATIVE
NONHDLC SERPL-MCNC: 207 MG/DL (ref ?–130)
NT-PROBNP SERPL-MCNC: 4729 PG/ML (ref ?–125)
OSMOLALITY SERPL CALC.SUM OF ELEC: 296 MOSM/KG (ref 275–295)
P AXIS: 28 DEGREES
P AXIS: 29 DEGREES
P-R INTERVAL: 180 MS
P-R INTERVAL: 192 MS
PH UR STRIP.AUTO: 5.5 [PH] (ref 5–8)
PHOSPHATE SERPL-MCNC: 3.3 MG/DL (ref 2.5–4.9)
PLATELET # BLD AUTO: 186 10(3)UL (ref 150–450)
POTASSIUM SERPL-SCNC: 3.1 MMOL/L (ref 3.5–5.1)
POTASSIUM SERPL-SCNC: 3.3 MMOL/L (ref 3.5–5.1)
PROT SERPL-MCNC: 6.8 G/DL (ref 6.4–8.2)
PROT UR-MCNC: 83.8 MG/DL
PROT/CREAT UR-RTO: 0.68
Q-T INTERVAL: 370 MS
Q-T INTERVAL: 392 MS
QRS DURATION: 88 MS
QRS DURATION: 88 MS
QTC CALCULATION (BEZET): 493 MS
QTC CALCULATION (BEZET): 505 MS
R AXIS: 15 DEGREES
R AXIS: 30 DEGREES
RBC # BLD AUTO: 3.67 X10(6)UL
RBC UR QL AUTO: NEGATIVE
SARS-COV-2 RNA RESP QL NAA+PROBE: NOT DETECTED
SODIUM SERPL-SCNC: 139 MMOL/L (ref 136–145)
SODIUM SERPL-SCNC: 96 MMOL/L
SP GR UR STRIP.AUTO: 1.02 (ref 1–1.03)
T AXIS: 119 DEGREES
T AXIS: 133 DEGREES
TRIGL SERPL-MCNC: 414 MG/DL (ref 30–149)
TROPONIN I HIGH SENSITIVITY: 168 NG/L
TROPONIN I HIGH SENSITIVITY: 183 NG/L
UROBILINOGEN UR STRIP.AUTO-MCNC: 0.2 MG/DL
UUN UR-MCNC: 537 MG/DL
VENTRICULAR RATE: 100 BPM
VENTRICULAR RATE: 107 BPM
VLDLC SERPL CALC-MCNC: 78 MG/DL (ref 0–30)
WBC # BLD AUTO: 7.9 X10(3) UL (ref 4–11)

## 2022-07-10 PROCEDURE — 80053 COMPREHEN METABOLIC PANEL: CPT | Performed by: EMERGENCY MEDICINE

## 2022-07-10 PROCEDURE — 83880 ASSAY OF NATRIURETIC PEPTIDE: CPT | Performed by: EMERGENCY MEDICINE

## 2022-07-10 PROCEDURE — 83690 ASSAY OF LIPASE: CPT | Performed by: EMERGENCY MEDICINE

## 2022-07-10 PROCEDURE — 96374 THER/PROPH/DIAG INJ IV PUSH: CPT

## 2022-07-10 PROCEDURE — 93010 ELECTROCARDIOGRAM REPORT: CPT

## 2022-07-10 PROCEDURE — 82570 ASSAY OF URINE CREATININE: CPT | Performed by: INTERNAL MEDICINE

## 2022-07-10 PROCEDURE — 80061 LIPID PANEL: CPT | Performed by: EMERGENCY MEDICINE

## 2022-07-10 PROCEDURE — 99285 EMERGENCY DEPT VISIT HI MDM: CPT

## 2022-07-10 PROCEDURE — 93005 ELECTROCARDIOGRAM TRACING: CPT

## 2022-07-10 PROCEDURE — 82962 GLUCOSE BLOOD TEST: CPT

## 2022-07-10 PROCEDURE — 84100 ASSAY OF PHOSPHORUS: CPT | Performed by: EMERGENCY MEDICINE

## 2022-07-10 PROCEDURE — 84156 ASSAY OF PROTEIN URINE: CPT | Performed by: INTERNAL MEDICINE

## 2022-07-10 PROCEDURE — 85025 COMPLETE CBC W/AUTO DIFF WBC: CPT | Performed by: EMERGENCY MEDICINE

## 2022-07-10 PROCEDURE — 84132 ASSAY OF SERUM POTASSIUM: CPT | Performed by: INTERNAL MEDICINE

## 2022-07-10 PROCEDURE — 84540 ASSAY OF URINE/UREA-N: CPT | Performed by: INTERNAL MEDICINE

## 2022-07-10 PROCEDURE — 84484 ASSAY OF TROPONIN QUANT: CPT | Performed by: EMERGENCY MEDICINE

## 2022-07-10 PROCEDURE — 96376 TX/PRO/DX INJ SAME DRUG ADON: CPT

## 2022-07-10 PROCEDURE — 82043 UR ALBUMIN QUANTITATIVE: CPT | Performed by: INTERNAL MEDICINE

## 2022-07-10 PROCEDURE — 83735 ASSAY OF MAGNESIUM: CPT | Performed by: INTERNAL MEDICINE

## 2022-07-10 PROCEDURE — 74176 CT ABD & PELVIS W/O CONTRAST: CPT | Performed by: EMERGENCY MEDICINE

## 2022-07-10 PROCEDURE — 71045 X-RAY EXAM CHEST 1 VIEW: CPT | Performed by: EMERGENCY MEDICINE

## 2022-07-10 PROCEDURE — 81001 URINALYSIS AUTO W/SCOPE: CPT | Performed by: EMERGENCY MEDICINE

## 2022-07-10 PROCEDURE — 84300 ASSAY OF URINE SODIUM: CPT | Performed by: INTERNAL MEDICINE

## 2022-07-10 PROCEDURE — 83735 ASSAY OF MAGNESIUM: CPT | Performed by: HOSPITALIST

## 2022-07-10 RX ORDER — BUPROPION HYDROCHLORIDE 150 MG/1
150 TABLET, EXTENDED RELEASE ORAL 2 TIMES DAILY
Status: DISCONTINUED | OUTPATIENT
Start: 2022-07-10 | End: 2022-07-12

## 2022-07-10 RX ORDER — FENOFIBRATE 134 MG/1
134 CAPSULE ORAL NIGHTLY
Status: DISCONTINUED | OUTPATIENT
Start: 2022-07-10 | End: 2022-07-12

## 2022-07-10 RX ORDER — HYDROMORPHONE HYDROCHLORIDE 1 MG/ML
0.8 INJECTION, SOLUTION INTRAMUSCULAR; INTRAVENOUS; SUBCUTANEOUS EVERY 2 HOUR PRN
Status: DISCONTINUED | OUTPATIENT
Start: 2022-07-10 | End: 2022-07-12

## 2022-07-10 RX ORDER — POTASSIUM CHLORIDE 20 MEQ/1
40 TABLET, EXTENDED RELEASE ORAL ONCE
Status: COMPLETED | OUTPATIENT
Start: 2022-07-10 | End: 2022-07-10

## 2022-07-10 RX ORDER — ALBUTEROL SULFATE 90 UG/1
2 AEROSOL, METERED RESPIRATORY (INHALATION) EVERY 4 HOURS PRN
Status: DISCONTINUED | OUTPATIENT
Start: 2022-07-10 | End: 2022-07-12

## 2022-07-10 RX ORDER — EPLERENONE 25 MG/1
50 TABLET, FILM COATED ORAL DAILY
Status: DISCONTINUED | OUTPATIENT
Start: 2022-07-10 | End: 2022-07-12

## 2022-07-10 RX ORDER — DEXTROSE MONOHYDRATE 25 G/50ML
50 INJECTION, SOLUTION INTRAVENOUS
Status: DISCONTINUED | OUTPATIENT
Start: 2022-07-10 | End: 2022-07-12

## 2022-07-10 RX ORDER — LAMOTRIGINE 150 MG/1
150 TABLET ORAL DAILY
Status: DISCONTINUED | OUTPATIENT
Start: 2022-07-10 | End: 2022-07-12

## 2022-07-10 RX ORDER — ACETAMINOPHEN 500 MG
500 TABLET ORAL EVERY 6 HOURS PRN
Status: DISCONTINUED | OUTPATIENT
Start: 2022-07-10 | End: 2022-07-12

## 2022-07-10 RX ORDER — NICOTINE POLACRILEX 4 MG
15 LOZENGE BUCCAL
Status: DISCONTINUED | OUTPATIENT
Start: 2022-07-10 | End: 2022-07-12

## 2022-07-10 RX ORDER — HEPARIN SODIUM 5000 [USP'U]/ML
5000 INJECTION, SOLUTION INTRAVENOUS; SUBCUTANEOUS EVERY 8 HOURS SCHEDULED
Status: DISCONTINUED | OUTPATIENT
Start: 2022-07-10 | End: 2022-07-12

## 2022-07-10 RX ORDER — MORPHINE SULFATE 4 MG/ML
2 INJECTION, SOLUTION INTRAMUSCULAR; INTRAVENOUS ONCE
Status: COMPLETED | OUTPATIENT
Start: 2022-07-10 | End: 2022-07-10

## 2022-07-10 RX ORDER — ACETAMINOPHEN 500 MG
1000 TABLET ORAL EVERY 6 HOURS PRN
Status: DISCONTINUED | OUTPATIENT
Start: 2022-07-10 | End: 2022-07-12

## 2022-07-10 RX ORDER — METOPROLOL SUCCINATE 100 MG/1
100 TABLET, EXTENDED RELEASE ORAL
Status: DISCONTINUED | OUTPATIENT
Start: 2022-07-10 | End: 2022-07-12

## 2022-07-10 RX ORDER — FLUOXETINE HYDROCHLORIDE 20 MG/1
20 CAPSULE ORAL DAILY
COMMUNITY

## 2022-07-10 RX ORDER — HYDRALAZINE HYDROCHLORIDE 50 MG/1
100 TABLET, FILM COATED ORAL 3 TIMES DAILY
Status: DISCONTINUED | OUTPATIENT
Start: 2022-07-10 | End: 2022-07-12

## 2022-07-10 RX ORDER — HYDROMORPHONE HYDROCHLORIDE 1 MG/ML
0.4 INJECTION, SOLUTION INTRAMUSCULAR; INTRAVENOUS; SUBCUTANEOUS EVERY 2 HOUR PRN
Status: DISCONTINUED | OUTPATIENT
Start: 2022-07-10 | End: 2022-07-12

## 2022-07-10 RX ORDER — HYDROMORPHONE HYDROCHLORIDE 1 MG/ML
0.2 INJECTION, SOLUTION INTRAMUSCULAR; INTRAVENOUS; SUBCUTANEOUS EVERY 2 HOUR PRN
Status: DISCONTINUED | OUTPATIENT
Start: 2022-07-10 | End: 2022-07-12

## 2022-07-10 RX ORDER — MAGNESIUM OXIDE 400 MG/1
800 TABLET ORAL ONCE
Status: COMPLETED | OUTPATIENT
Start: 2022-07-10 | End: 2022-07-10

## 2022-07-10 RX ORDER — FLUTICASONE PROPIONATE 50 MCG
1 SPRAY, SUSPENSION (ML) NASAL 2 TIMES DAILY
Status: DISCONTINUED | OUTPATIENT
Start: 2022-07-10 | End: 2022-07-12

## 2022-07-10 RX ORDER — MORPHINE SULFATE 4 MG/ML
4 INJECTION, SOLUTION INTRAMUSCULAR; INTRAVENOUS ONCE
Status: COMPLETED | OUTPATIENT
Start: 2022-07-10 | End: 2022-07-10

## 2022-07-10 RX ORDER — METOPROLOL SUCCINATE 100 MG/1
100 TABLET, EXTENDED RELEASE ORAL DAILY
COMMUNITY

## 2022-07-10 RX ORDER — ONDANSETRON 2 MG/ML
4 INJECTION INTRAMUSCULAR; INTRAVENOUS EVERY 6 HOURS PRN
Status: DISCONTINUED | OUTPATIENT
Start: 2022-07-10 | End: 2022-07-12

## 2022-07-10 RX ORDER — NICOTINE POLACRILEX 4 MG
30 LOZENGE BUCCAL
Status: DISCONTINUED | OUTPATIENT
Start: 2022-07-10 | End: 2022-07-12

## 2022-07-10 RX ORDER — ARIPIPRAZOLE 10 MG/1
10 TABLET ORAL DAILY
Status: DISCONTINUED | OUTPATIENT
Start: 2022-07-10 | End: 2022-07-12

## 2022-07-10 RX ORDER — ISOSORBIDE MONONITRATE 30 MG/1
30 TABLET, EXTENDED RELEASE ORAL DAILY
Status: DISCONTINUED | OUTPATIENT
Start: 2022-07-10 | End: 2022-07-12

## 2022-07-10 RX ORDER — ARIPIPRAZOLE 5 MG/1
5 TABLET ORAL DAILY
Status: DISCONTINUED | OUTPATIENT
Start: 2022-07-10 | End: 2022-07-10

## 2022-07-10 RX ORDER — FLUOXETINE HYDROCHLORIDE 20 MG/1
20 CAPSULE ORAL DAILY
Status: DISCONTINUED | OUTPATIENT
Start: 2022-07-10 | End: 2022-07-12

## 2022-07-10 RX ORDER — BUPROPION HYDROCHLORIDE 150 MG/1
150 TABLET, EXTENDED RELEASE ORAL 2 TIMES DAILY
COMMUNITY

## 2022-07-10 RX ORDER — ISOSORBIDE MONONITRATE 30 MG/1
30 TABLET, EXTENDED RELEASE ORAL DAILY
COMMUNITY
Start: 2022-06-24

## 2022-07-10 RX ORDER — ACETAMINOPHEN 500 MG
500 TABLET ORAL EVERY 6 HOURS PRN
Status: DISCONTINUED | OUTPATIENT
Start: 2022-07-10 | End: 2022-07-10

## 2022-07-10 NOTE — PLAN OF CARE
Received patient at 0730. Alert and Oriented x4. Tele Rhythm NSR, ST with activity. Has 30 beat run of vtach this am, Dr Tejas Garcia notified and cardiology consult ordered. Will recheck labs and Echo ordered. O2 saturation 96% On room air. Breath sounds diminished but clear, shortness of breath noted with exertion. Bed is locked and in low position. Call light and personal items within reach. C/O abdominal tenderness/swelling, IV dilaudid with relief. BP elevated this am, improved after home medications ordered. Pt voiding with no issue. Nephrology following. Pt tolerating ambulation well with SBA. Reviewed plan of care and patient verbalizes understanding.       Problem: Diabetes/Glucose Control  Goal: Glucose maintained within prescribed range  Description: INTERVENTIONS:  - Monitor Blood Glucose as ordered  - Assess for signs and symptoms of hyperglycemia and hypoglycemia  - Administer ordered medications to maintain glucose within target range  - Assess barriers to adequate nutritional intake and initiate nutrition consult as needed  - Instruct patient on self management of diabetes  Outcome: Progressing     Problem: Patient/Family Goals  Goal: Patient/Family Long Term Goal  Description: Patient's Long Term Goal: to go home    Interventions:  - labs, echo, tele monitoring, increase activity, cardiac and renal consults    - See additional Care Plan goals for specific interventions  Outcome: Progressing  Goal: Patient/Family Short Term Goal  Description: Patient's Short Term Goal: feel better    Interventions:   - - labs, echo, tele monitoring, increase activity, cardiac and renal consults    - See additional Care Plan goals for specific interventions  Outcome: Progressing     Problem: CARDIOVASCULAR - ADULT  Goal: Maintains optimal cardiac output and hemodynamic stability  Description: INTERVENTIONS:  - Monitor vital signs, rhythm, and trends  - Monitor for bleeding, hypotension and signs of decreased cardiac output  - Evaluate effectiveness of vasoactive medications to optimize hemodynamic stability  - Monitor arterial and/or venous puncture sites for bleeding and/or hematoma  - Assess quality of pulses, skin color and temperature  - Assess for signs of decreased coronary artery perfusion - ex.  Angina  - Evaluate fluid balance, assess for edema, trend weights  Outcome: Progressing  Goal: Absence of cardiac arrhythmias or at baseline  Description: INTERVENTIONS:  - Continuous cardiac monitoring, monitor vital signs, obtain 12 lead EKG if indicated  - Evaluate effectiveness of antiarrhythmic and heart rate control medications as ordered  - Initiate emergency measures for life threatening arrhythmias  - Monitor electrolytes and administer replacement therapy as ordered  Outcome: Progressing     Problem: METABOLIC/FLUID AND ELECTROLYTES - ADULT  Goal: Glucose maintained within prescribed range  Description: INTERVENTIONS:  - Monitor Blood Glucose as ordered  - Assess for signs and symptoms of hyperglycemia and hypoglycemia  - Administer ordered medications to maintain glucose within target range  - Assess barriers to adequate nutritional intake and initiate nutrition consult as needed  - Instruct patient on self management of diabetes  Outcome: Progressing  Goal: Electrolytes maintained within normal limits  Description: INTERVENTIONS:  - Monitor labs and rhythm and assess patient for signs and symptoms of electrolyte imbalances  - Administer electrolyte replacement as ordered  - Monitor response to electrolyte replacements, including rhythm and repeat lab results as appropriate  - Fluid restriction as ordered  - Instruct patient on fluid and nutrition restrictions as appropriate  Outcome: Progressing  Goal: Hemodynamic stability and optimal renal function maintained  Description: INTERVENTIONS:  - Monitor labs and assess for signs and symptoms of volume excess or deficit  - Monitor intake, output and patient weight  - Monitor urine specific gravity, serum osmolarity and serum sodium as indicated or ordered  - Monitor response to interventions for patient's volume status, including labs, urine output, blood pressure (other measures as available)  - Encourage oral intake as appropriate  - Instruct patient on fluid and nutrition restrictions as appropriate  Outcome: Progressing

## 2022-07-10 NOTE — ED QUICK NOTES
Charge nurse Rodrigo Murphy notified that EMS is at bedside to transfer the patient over to [facility].

## 2022-07-10 NOTE — ED INITIAL ASSESSMENT (HPI)
Pt with hx of chf states feels like he is having fluid build up in his abd. Also states difficulty breathing and and pain. Was recently hospitalized twice for same reasons. Was taken off his diuretic r/t kidney problems.

## 2022-07-10 NOTE — PLAN OF CARE
Admitted pt at [phone] from  ED. Alert and oriented x4. On RA. Denies any chest pain. RAMSAY. NSR on tele. Continent of bowel and bladder. C/o abd pain, MD made aware. Up with SBA. Call light within reach.        Problem: Diabetes/Glucose Control  Goal: Glucose maintained within prescribed range  Description: INTERVENTIONS:  - Monitor Blood Glucose as ordered  - Assess for signs and symptoms of hyperglycemia and hypoglycemia  - Administer ordered medications to maintain glucose within target range  - Assess barriers to adequate nutritional intake and initiate nutrition consult as needed  - Instruct patient on self management of diabetes  Outcome: Progressing

## 2022-07-10 NOTE — PROGRESS NOTES
07/10/22 1538 07/10/22 1540 07/10/22 1542   Vital Signs   /83 128/87 129/79   MAP (mmHg) 93 96 88   BP Location Left arm Left arm Left arm   BP Method Automatic Automatic Automatic   Patient Position Lying Sitting Standing       Still slightly lightheaded when up. PT/OT consult ordered. Pt states he was scheduled for OP therapy since he was in bed hospitalized Erin 10-25.

## 2022-07-10 NOTE — ED QUICK NOTES
..Orders for admission, patient is aware of plan and ready to go upstairs. Any questions, please call ED RN Arslan Mukherjee  at extension [phone]. Vaccinated?  Yes  Type of COVID test sent: Rapid- negative  COVID Suspicion level: Low    Titratable drug(s) infusing:  Rate:  20G RAC  LOC at time of transport:  AO x3  Other pertinent information:    CIWA score=N/A  NIH score=N/A

## 2022-07-11 ENCOUNTER — APPOINTMENT (OUTPATIENT)
Dept: CV DIAGNOSTICS | Facility: HOSPITAL | Age: 42
End: 2022-07-11
Attending: NURSE PRACTITIONER
Payer: MEDICAID

## 2022-07-11 LAB
ALBUMIN SERPL-MCNC: 2.9 G/DL (ref 3.4–5)
ANION GAP SERPL CALC-SCNC: 6 MMOL/L (ref 0–18)
BASOPHILS # BLD AUTO: 0.06 X10(3) UL (ref 0–0.2)
BASOPHILS NFR BLD AUTO: 1.2 %
BUN BLD-MCNC: 22 MG/DL (ref 7–18)
CALCIUM BLD-MCNC: 8.9 MG/DL (ref 8.5–10.1)
CHLORIDE SERPL-SCNC: 115 MMOL/L (ref 98–112)
CO2 SERPL-SCNC: 21 MMOL/L (ref 21–32)
CREAT BLD-MCNC: 2.36 MG/DL
EOSINOPHIL # BLD AUTO: 0.39 X10(3) UL (ref 0–0.7)
EOSINOPHIL NFR BLD AUTO: 7.5 %
ERYTHROCYTE [DISTWIDTH] IN BLOOD BY AUTOMATED COUNT: 14.5 %
GLUCOSE BLD-MCNC: 126 MG/DL (ref 70–99)
GLUCOSE BLD-MCNC: 128 MG/DL (ref 70–99)
GLUCOSE BLD-MCNC: 147 MG/DL (ref 70–99)
GLUCOSE BLD-MCNC: 156 MG/DL (ref 70–99)
GLUCOSE BLD-MCNC: 156 MG/DL (ref 70–99)
HCT VFR BLD AUTO: 36 %
HGB BLD-MCNC: 11.7 G/DL
IMM GRANULOCYTES # BLD AUTO: 0.01 X10(3) UL (ref 0–1)
IMM GRANULOCYTES NFR BLD: 0.2 %
LYMPHOCYTES # BLD AUTO: 1.92 X10(3) UL (ref 1–4)
LYMPHOCYTES NFR BLD AUTO: 36.9 %
MAGNESIUM SERPL-MCNC: 2.1 MG/DL (ref 1.6–2.6)
MCH RBC QN AUTO: 31.7 PG (ref 26–34)
MCHC RBC AUTO-ENTMCNC: 32.5 G/DL (ref 31–37)
MCV RBC AUTO: 97.6 FL
MONOCYTES # BLD AUTO: 0.48 X10(3) UL (ref 0.1–1)
MONOCYTES NFR BLD AUTO: 9.2 %
NEUTROPHILS # BLD AUTO: 2.34 X10 (3) UL (ref 1.5–7.7)
NEUTROPHILS # BLD AUTO: 2.34 X10(3) UL (ref 1.5–7.7)
NEUTROPHILS NFR BLD AUTO: 45 %
OSMOLALITY SERPL CALC.SUM OF ELEC: 299 MOSM/KG (ref 275–295)
PHOSPHATE SERPL-MCNC: 2.6 MG/DL (ref 2.5–4.9)
PLATELET # BLD AUTO: 177 10(3)UL (ref 150–450)
POTASSIUM SERPL-SCNC: 3.6 MMOL/L (ref 3.5–5.1)
RBC # BLD AUTO: 3.69 X10(6)UL
SODIUM SERPL-SCNC: 142 MMOL/L (ref 136–145)
WBC # BLD AUTO: 5.2 X10(3) UL (ref 4–11)

## 2022-07-11 PROCEDURE — 82962 GLUCOSE BLOOD TEST: CPT

## 2022-07-11 PROCEDURE — 97161 PT EVAL LOW COMPLEX 20 MIN: CPT

## 2022-07-11 PROCEDURE — 97165 OT EVAL LOW COMPLEX 30 MIN: CPT

## 2022-07-11 PROCEDURE — 93306 TTE W/DOPPLER COMPLETE: CPT | Performed by: NURSE PRACTITIONER

## 2022-07-11 PROCEDURE — 85025 COMPLETE CBC W/AUTO DIFF WBC: CPT | Performed by: HOSPITALIST

## 2022-07-11 PROCEDURE — 83735 ASSAY OF MAGNESIUM: CPT | Performed by: INTERNAL MEDICINE

## 2022-07-11 PROCEDURE — 97110 THERAPEUTIC EXERCISES: CPT

## 2022-07-11 PROCEDURE — 80069 RENAL FUNCTION PANEL: CPT | Performed by: INTERNAL MEDICINE

## 2022-07-11 PROCEDURE — 94640 AIRWAY INHALATION TREATMENT: CPT

## 2022-07-11 RX ORDER — DEXTROAMPHETAMINE SACCHARATE, AMPHETAMINE ASPARTATE, DEXTROAMPHETAMINE SULFATE AND AMPHETAMINE SULFATE 2.5; 2.5; 2.5; 2.5 MG/1; MG/1; MG/1; MG/1
10 TABLET ORAL
Status: DISCONTINUED | OUTPATIENT
Start: 2022-07-11 | End: 2022-07-12

## 2022-07-11 RX ORDER — FUROSEMIDE 40 MG/1
40 TABLET ORAL DAILY
Status: DISCONTINUED | OUTPATIENT
Start: 2022-07-11 | End: 2022-07-12

## 2022-07-11 RX ORDER — DEXTROAMPHETAMINE SACCHARATE, AMPHETAMINE ASPARTATE, DEXTROAMPHETAMINE SULFATE AND AMPHETAMINE SULFATE 2.5; 2.5; 2.5; 2.5 MG/1; MG/1; MG/1; MG/1
10 TABLET ORAL DAILY
Refills: 0 | Status: DISCONTINUED | OUTPATIENT
Start: 2022-07-11 | End: 2022-07-11 | Stop reason: DRUGHIGH

## 2022-07-11 RX ORDER — POTASSIUM CHLORIDE 20 MEQ/1
40 TABLET, EXTENDED RELEASE ORAL ONCE
Status: COMPLETED | OUTPATIENT
Start: 2022-07-11 | End: 2022-07-11

## 2022-07-11 NOTE — PLAN OF CARE
Alert and oriented x4. On RA. Denies any SOB or chest pain. NSR on tele. Continent of bowel and bladder. Denies pain. Up with SBA. Call light within reach. Plan for Echo in the a.m. Problem: Diabetes/Glucose Control  Goal: Glucose maintained within prescribed range  Description: INTERVENTIONS:  - Monitor Blood Glucose as ordered  - Assess for signs and symptoms of hyperglycemia and hypoglycemia  - Administer ordered medications to maintain glucose within target range  - Assess barriers to adequate nutritional intake and initiate nutrition consult as needed  - Instruct patient on self management of diabetes  Outcome: Progressing     Problem: CARDIOVASCULAR - ADULT  Goal: Maintains optimal cardiac output and hemodynamic stability  Description: INTERVENTIONS:  - Monitor vital signs, rhythm, and trends  - Monitor for bleeding, hypotension and signs of decreased cardiac output  - Evaluate effectiveness of vasoactive medications to optimize hemodynamic stability  - Monitor arterial and/or venous puncture sites for bleeding and/or hematoma  - Assess quality of pulses, skin color and temperature  - Assess for signs of decreased coronary artery perfusion - ex.  Angina  - Evaluate fluid balance, assess for edema, trend weights  Outcome: Progressing  Goal: Absence of cardiac arrhythmias or at baseline  Description: INTERVENTIONS:  - Continuous cardiac monitoring, monitor vital signs, obtain 12 lead EKG if indicated  - Evaluate effectiveness of antiarrhythmic and heart rate control medications as ordered  - Initiate emergency measures for life threatening arrhythmias  - Monitor electrolytes and administer replacement therapy as ordered  Outcome: Progressing     Problem: METABOLIC/FLUID AND ELECTROLYTES - ADULT  Goal: Glucose maintained within prescribed range  Description: INTERVENTIONS:  - Monitor Blood Glucose as ordered  - Assess for signs and symptoms of hyperglycemia and hypoglycemia  - Administer ordered medications to maintain glucose within target range  - Assess barriers to adequate nutritional intake and initiate nutrition consult as needed  - Instruct patient on self management of diabetes  Outcome: Progressing  Goal: Electrolytes maintained within normal limits  Description: INTERVENTIONS:  - Monitor labs and rhythm and assess patient for signs and symptoms of electrolyte imbalances  - Administer electrolyte replacement as ordered  - Monitor response to electrolyte replacements, including rhythm and repeat lab results as appropriate  - Fluid restriction as ordered  - Instruct patient on fluid and nutrition restrictions as appropriate  Outcome: Progressing  Goal: Hemodynamic stability and optimal renal function maintained  Description: INTERVENTIONS:  - Monitor labs and assess for signs and symptoms of volume excess or deficit  - Monitor intake, output and patient weight  - Monitor urine specific gravity, serum osmolarity and serum sodium as indicated or ordered  - Monitor response to interventions for patient's volume status, including labs, urine output, blood pressure (other measures as available)  - Encourage oral intake as appropriate  - Instruct patient on fluid and nutrition restrictions as appropriate  Outcome: Progressing

## 2022-07-11 NOTE — PLAN OF CARE
Pt alert and oriented x4. Continuous tele monitoring in place. NSR on the monitor. Reports pain to RLQ that radiated to flank in am but denied pain later in the day. No chest pain or dizziness. Ambulated around unit x2. Reports SOB with exertion but no desaturations noted. Echo completed per  order. Potassium replaced per renal order. Adderall and lasix added. Educated regarding side effects of lasix. Verbalized understanding. Comfort and safety maintained. Will continue to monitor. Problem: Diabetes/Glucose Control  Goal: Glucose maintained within prescribed range  Description: INTERVENTIONS:  - Monitor Blood Glucose as ordered  - Assess for signs and symptoms of hyperglycemia and hypoglycemia  - Administer ordered medications to maintain glucose within target range  - Assess barriers to adequate nutritional intake and initiate nutrition consult as needed  - Instruct patient on self management of diabetes  Outcome: Progressing     Problem: CARDIOVASCULAR - ADULT  Goal: Maintains optimal cardiac output and hemodynamic stability  Description: INTERVENTIONS:  - Monitor vital signs, rhythm, and trends  - Monitor for bleeding, hypotension and signs of decreased cardiac output  - Evaluate effectiveness of vasoactive medications to optimize hemodynamic stability  - Monitor arterial and/or venous puncture sites for bleeding and/or hematoma  - Assess quality of pulses, skin color and temperature  - Assess for signs of decreased coronary artery perfusion - ex.  Angina  - Evaluate fluid balance, assess for edema, trend weights  Outcome: Progressing  Goal: Absence of cardiac arrhythmias or at baseline  Description: INTERVENTIONS:  - Continuous cardiac monitoring, monitor vital signs, obtain 12 lead EKG if indicated  - Evaluate effectiveness of antiarrhythmic and heart rate control medications as ordered  - Initiate emergency measures for life threatening arrhythmias  - Monitor electrolytes and administer replacement therapy as ordered  Outcome: Progressing     Problem: METABOLIC/FLUID AND ELECTROLYTES - ADULT  Goal: Glucose maintained within prescribed range  Description: INTERVENTIONS:  - Monitor Blood Glucose as ordered  - Assess for signs and symptoms of hyperglycemia and hypoglycemia  - Administer ordered medications to maintain glucose within target range  - Assess barriers to adequate nutritional intake and initiate nutrition consult as needed  - Instruct patient on self management of diabetes  Outcome: Progressing  Goal: Electrolytes maintained within normal limits  Description: INTERVENTIONS:  - Monitor labs and rhythm and assess patient for signs and symptoms of electrolyte imbalances  - Administer electrolyte replacement as ordered  - Monitor response to electrolyte replacements, including rhythm and repeat lab results as appropriate  - Fluid restriction as ordered  - Instruct patient on fluid and nutrition restrictions as appropriate  Outcome: Progressing  Goal: Hemodynamic stability and optimal renal function maintained  Description: INTERVENTIONS:  - Monitor labs and assess for signs and symptoms of volume excess or deficit  - Monitor intake, output and patient weight  - Monitor urine specific gravity, serum osmolarity and serum sodium as indicated or ordered  - Monitor response to interventions for patient's volume status, including labs, urine output, blood pressure (other measures as available)  - Encourage oral intake as appropriate  - Instruct patient on fluid and nutrition restrictions as appropriate  Outcome: Progressing

## 2022-07-12 VITALS
DIASTOLIC BLOOD PRESSURE: 102 MMHG | HEART RATE: 84 BPM | BODY MASS INDEX: 32.85 KG/M2 | RESPIRATION RATE: 18 BRPM | WEIGHT: 255.94 LBS | OXYGEN SATURATION: 96 % | TEMPERATURE: 98 F | HEIGHT: 74 IN | SYSTOLIC BLOOD PRESSURE: 144 MMHG

## 2022-07-12 LAB
ALBUMIN SERPL-MCNC: 3.1 G/DL (ref 3.4–5)
ANION GAP SERPL CALC-SCNC: 8 MMOL/L (ref 0–18)
BUN BLD-MCNC: 22 MG/DL (ref 7–18)
CALCIUM BLD-MCNC: 9.3 MG/DL (ref 8.5–10.1)
CHLORIDE SERPL-SCNC: 115 MMOL/L (ref 98–112)
CO2 SERPL-SCNC: 20 MMOL/L (ref 21–32)
CREAT BLD-MCNC: 2.49 MG/DL
GLUCOSE BLD-MCNC: 116 MG/DL (ref 70–99)
GLUCOSE BLD-MCNC: 128 MG/DL (ref 70–99)
GLUCOSE BLD-MCNC: 154 MG/DL (ref 70–99)
MAGNESIUM SERPL-MCNC: 2.3 MG/DL (ref 1.6–2.6)
OSMOLALITY SERPL CALC.SUM OF ELEC: 301 MOSM/KG (ref 275–295)
PHOSPHATE SERPL-MCNC: 2.7 MG/DL (ref 2.5–4.9)
POTASSIUM SERPL-SCNC: 3.4 MMOL/L (ref 3.5–5.1)
SODIUM SERPL-SCNC: 143 MMOL/L (ref 136–145)

## 2022-07-12 PROCEDURE — 83735 ASSAY OF MAGNESIUM: CPT | Performed by: INTERNAL MEDICINE

## 2022-07-12 PROCEDURE — 82962 GLUCOSE BLOOD TEST: CPT

## 2022-07-12 PROCEDURE — 80069 RENAL FUNCTION PANEL: CPT | Performed by: INTERNAL MEDICINE

## 2022-07-12 RX ORDER — EPLERENONE 50 MG/1
50 TABLET, FILM COATED ORAL DAILY
Qty: 30 TABLET | Refills: 2 | Status: SHIPPED | OUTPATIENT
Start: 2022-07-13 | End: 2022-07-12

## 2022-07-12 RX ORDER — EPLERENONE 50 MG/1
50 TABLET, FILM COATED ORAL DAILY
Qty: 30 TABLET | Refills: 2 | Status: SHIPPED | OUTPATIENT
Start: 2022-07-13

## 2022-07-12 RX ORDER — FUROSEMIDE 40 MG/1
40 TABLET ORAL DAILY
Qty: 60 TABLET | Refills: 1 | Status: SHIPPED | OUTPATIENT
Start: 2022-07-13

## 2022-07-12 RX ORDER — POTASSIUM CHLORIDE 20 MEQ/1
20 TABLET, EXTENDED RELEASE ORAL EVERY OTHER DAY
Qty: 30 TABLET | Refills: 0 | Status: SHIPPED | OUTPATIENT
Start: 2022-07-12

## 2022-07-12 NOTE — PLAN OF CARE
Pt alert and oriented x4. Continuous tele monitoring in place. NSR on the monitor. Denies pain, SOB and dizziness. Voiding without difficulty. Good appetite. Seen by cardiology, renal and hospitalist and cleared for dc by all clinicians. Pt agreeable to dc to home with all personal belongings via private vehicle. Will review dc paperwork including medication regimen and follow ups.

## 2022-07-20 ENCOUNTER — HOSPITAL ENCOUNTER (EMERGENCY)
Age: 42
Discharge: HOME | End: 2022-07-20
Payer: MEDICAID

## 2022-07-20 VITALS
BODY MASS INDEX: 34.27 KG/M2 | HEART RATE: 76 BPM | DIASTOLIC BLOOD PRESSURE: 88 MMHG | TEMPERATURE: 98 F | WEIGHT: 267 LBS | RESPIRATION RATE: 16 BRPM | HEIGHT: 74 IN | OXYGEN SATURATION: 98 % | SYSTOLIC BLOOD PRESSURE: 119 MMHG

## 2022-07-20 DIAGNOSIS — Z76.0 ENCOUNTER FOR MEDICATION REFILL: Primary | ICD-10-CM

## 2022-07-20 PROCEDURE — 99283 EMERGENCY DEPT VISIT LOW MDM: CPT

## 2022-07-20 RX ORDER — FLUOXETINE HYDROCHLORIDE 40 MG/1
40 CAPSULE ORAL DAILY
Qty: 30 CAPSULE | Refills: 0 | Status: SHIPPED | OUTPATIENT
Start: 2022-07-20

## 2022-07-20 RX ORDER — LISDEXAMFETAMINE DIMESYLATE 10 MG/1
5 TABLET, CHEWABLE ORAL DAILY
Qty: 14 TABLET | Refills: 0 | Status: SHIPPED | OUTPATIENT
Start: 2022-07-20 | End: 2022-08-19

## 2022-07-20 RX ORDER — LAMOTRIGINE 150 MG/1
150 TABLET ORAL DAILY
Qty: 30 TABLET | Refills: 0 | Status: SHIPPED | OUTPATIENT
Start: 2022-07-20

## 2022-07-20 NOTE — ED INITIAL ASSESSMENT (HPI)
Pt states he needs medication refill on lamictal 125 - fluoxetine 40 - vyvanse 5  Pt states his dr is on vacation until end of august

## 2022-08-06 ENCOUNTER — HOSPITAL ENCOUNTER (OUTPATIENT)
Facility: HOSPITAL | Age: 42
Setting detail: OBSERVATION
Discharge: HOME | End: 2022-08-07
Attending: EMERGENCY MEDICINE | Admitting: HOSPITALIST
Payer: MEDICAID

## 2022-08-06 ENCOUNTER — APPOINTMENT (OUTPATIENT)
Dept: GENERAL RADIOLOGY | Facility: HOSPITAL | Age: 42
End: 2022-08-06
Attending: EMERGENCY MEDICINE
Payer: MEDICAID

## 2022-08-06 DIAGNOSIS — I16.9 HYPERTENSIVE CRISIS: Primary | ICD-10-CM

## 2022-08-06 DIAGNOSIS — I50.9 ACUTE ON CHRONIC CONGESTIVE HEART FAILURE, UNSPECIFIED HEART FAILURE TYPE (HCC): ICD-10-CM

## 2022-08-06 LAB
ALBUMIN SERPL-MCNC: 3.4 G/DL (ref 3.4–5)
ALBUMIN/GLOB SERPL: 0.9 {RATIO} (ref 1–2)
ALP LIVER SERPL-CCNC: 125 U/L
ALT SERPL-CCNC: 23 U/L
ANION GAP SERPL CALC-SCNC: 10 MMOL/L (ref 0–18)
APTT PPP: 30.8 SECONDS (ref 23.3–35.6)
ARTERIAL PATENCY WRIST A: POSITIVE
AST SERPL-CCNC: 25 U/L (ref 15–37)
ATRIAL RATE: 85 BPM
ATRIAL RATE: 97 BPM
BASE EXCESS BLDA CALC-SCNC: -3.7 MMOL/L (ref ?–2)
BASOPHILS # BLD AUTO: 0.13 X10(3) UL (ref 0–0.2)
BASOPHILS NFR BLD AUTO: 1.3 %
BILIRUB SERPL-MCNC: 0.6 MG/DL (ref 0.1–2)
BODY TEMPERATURE: 98.6 F
BUN BLD-MCNC: 29 MG/DL (ref 7–18)
CA-I BLD-SCNC: 1.15 MMOL/L (ref 0.95–1.32)
CALCIUM BLD-MCNC: 8.7 MG/DL (ref 8.5–10.1)
CHLORIDE SERPL-SCNC: 112 MMOL/L (ref 98–112)
CHOLEST SERPL-MCNC: 240 MG/DL (ref ?–200)
CO2 SERPL-SCNC: 20 MMOL/L (ref 21–32)
COHGB MFR BLD: 2.9 % SAT (ref 0–3)
CREAT BLD-MCNC: 3.2 MG/DL
EOSINOPHIL # BLD AUTO: 0.49 X10(3) UL (ref 0–0.7)
EOSINOPHIL NFR BLD AUTO: 4.9 %
ERYTHROCYTE [DISTWIDTH] IN BLOOD BY AUTOMATED COUNT: 13.5 %
EST. AVERAGE GLUCOSE BLD GHB EST-MCNC: 108 MG/DL (ref 68–126)
GFR SERPLBLD BASED ON 1.73 SQ M-ARVRAT: 24 ML/MIN/1.73M2 (ref 60–?)
GLOBULIN PLAS-MCNC: 3.8 G/DL (ref 2.8–4.4)
GLUCOSE BLD-MCNC: 105 MG/DL (ref 70–99)
GLUCOSE BLD-MCNC: 121 MG/DL (ref 70–99)
GLUCOSE BLD-MCNC: 130 MG/DL (ref 70–99)
GLUCOSE BLD-MCNC: 147 MG/DL (ref 70–99)
HBA1C MFR BLD: 5.4 % (ref ?–5.7)
HCO3 BLDA-SCNC: 21.5 MEQ/L (ref 21–27)
HCT VFR BLD AUTO: 47 %
HDLC SERPL-MCNC: 38 MG/DL (ref 40–59)
HGB BLD-MCNC: 15.2 G/DL
HGB BLD-MCNC: 16 G/DL
IMM GRANULOCYTES # BLD AUTO: 0.02 X10(3) UL (ref 0–1)
IMM GRANULOCYTES NFR BLD: 0.2 %
INR BLD: 1.03 (ref 0.85–1.16)
LACTATE BLD-SCNC: 1.1 MMOL/L (ref 0.5–2)
LDLC SERPL CALC-MCNC: 161 MG/DL (ref ?–100)
LYMPHOCYTES # BLD AUTO: 2.32 X10(3) UL (ref 1–4)
LYMPHOCYTES NFR BLD AUTO: 23.3 %
MCH RBC QN AUTO: 30.7 PG (ref 26–34)
MCHC RBC AUTO-ENTMCNC: 34 G/DL (ref 31–37)
MCV RBC AUTO: 90 FL
METHGB MFR BLD: 0.5 % SAT (ref 0.4–1.5)
MONOCYTES # BLD AUTO: 1.12 X10(3) UL (ref 0.1–1)
MONOCYTES NFR BLD AUTO: 11.3 %
NEUTROPHILS # BLD AUTO: 5.87 X10 (3) UL (ref 1.5–7.7)
NEUTROPHILS # BLD AUTO: 5.87 X10(3) UL (ref 1.5–7.7)
NEUTROPHILS NFR BLD AUTO: 59 %
NONHDLC SERPL-MCNC: 202 MG/DL (ref ?–130)
NT-PROBNP SERPL-MCNC: 9055 PG/ML (ref ?–125)
OSMOLALITY SERPL CALC.SUM OF ELEC: 302 MOSM/KG (ref 275–295)
OXYHGB MFR BLDA: 78.7 % (ref 92–100)
P AXIS: 29 DEGREES
P AXIS: 33 DEGREES
P-R INTERVAL: 178 MS
P-R INTERVAL: 182 MS
PCO2 BLDA: 29 MM HG (ref 35–45)
PH BLDA: 7.43 [PH] (ref 7.35–7.45)
PLATELET # BLD AUTO: 242 10(3)UL (ref 150–450)
PO2 BLDA: 47 MM HG (ref 80–100)
POTASSIUM BLD-SCNC: 2.7 MMOL/L (ref 3.6–5.1)
POTASSIUM SERPL-SCNC: 3.2 MMOL/L (ref 3.5–5.1)
PROCALCITONIN SERPL-MCNC: 0.17 NG/ML (ref ?–0.16)
PROT SERPL-MCNC: 7.2 G/DL (ref 6.4–8.2)
PROTHROMBIN TIME: 13.6 SECONDS (ref 11.6–14.8)
Q-T INTERVAL: 386 MS
Q-T INTERVAL: 390 MS
QRS DURATION: 80 MS
QRS DURATION: 86 MS
QTC CALCULATION (BEZET): 459 MS
QTC CALCULATION (BEZET): 495 MS
R AXIS: 14 DEGREES
R AXIS: 17 DEGREES
RBC # BLD AUTO: 5.22 X10(6)UL
SARS-COV-2 RNA RESP QL NAA+PROBE: NOT DETECTED
SODIUM BLD-SCNC: 138 MMOL/L (ref 135–145)
SODIUM SERPL-SCNC: 142 MMOL/L (ref 136–145)
T AXIS: 23 DEGREES
T AXIS: 78 DEGREES
TRIGL SERPL-MCNC: 223 MG/DL (ref 30–149)
TROPONIN I HIGH SENSITIVITY: 403 NG/L
TROPONIN I HIGH SENSITIVITY: 474 NG/L
VENTRICULAR RATE: 85 BPM
VENTRICULAR RATE: 97 BPM
VLDLC SERPL CALC-MCNC: 44 MG/DL (ref 0–30)
WBC # BLD AUTO: 10 X10(3) UL (ref 4–11)

## 2022-08-06 PROCEDURE — 85018 HEMOGLOBIN: CPT | Performed by: EMERGENCY MEDICINE

## 2022-08-06 PROCEDURE — 82375 ASSAY CARBOXYHB QUANT: CPT | Performed by: EMERGENCY MEDICINE

## 2022-08-06 PROCEDURE — 84484 ASSAY OF TROPONIN QUANT: CPT | Performed by: INTERNAL MEDICINE

## 2022-08-06 PROCEDURE — 84295 ASSAY OF SERUM SODIUM: CPT | Performed by: EMERGENCY MEDICINE

## 2022-08-06 PROCEDURE — 80053 COMPREHEN METABOLIC PANEL: CPT | Performed by: EMERGENCY MEDICINE

## 2022-08-06 PROCEDURE — 84145 PROCALCITONIN (PCT): CPT | Performed by: HOSPITALIST

## 2022-08-06 PROCEDURE — 94640 AIRWAY INHALATION TREATMENT: CPT

## 2022-08-06 PROCEDURE — 96366 THER/PROPH/DIAG IV INF ADDON: CPT

## 2022-08-06 PROCEDURE — 84484 ASSAY OF TROPONIN QUANT: CPT | Performed by: EMERGENCY MEDICINE

## 2022-08-06 PROCEDURE — 83880 ASSAY OF NATRIURETIC PEPTIDE: CPT | Performed by: EMERGENCY MEDICINE

## 2022-08-06 PROCEDURE — 82803 BLOOD GASES ANY COMBINATION: CPT | Performed by: EMERGENCY MEDICINE

## 2022-08-06 PROCEDURE — 83605 ASSAY OF LACTIC ACID: CPT | Performed by: EMERGENCY MEDICINE

## 2022-08-06 PROCEDURE — 83050 HGB METHEMOGLOBIN QUAN: CPT | Performed by: EMERGENCY MEDICINE

## 2022-08-06 PROCEDURE — 96365 THER/PROPH/DIAG IV INF INIT: CPT

## 2022-08-06 PROCEDURE — 82962 GLUCOSE BLOOD TEST: CPT

## 2022-08-06 PROCEDURE — 84132 ASSAY OF SERUM POTASSIUM: CPT | Performed by: EMERGENCY MEDICINE

## 2022-08-06 PROCEDURE — 80061 LIPID PANEL: CPT | Performed by: EMERGENCY MEDICINE

## 2022-08-06 PROCEDURE — 93005 ELECTROCARDIOGRAM TRACING: CPT

## 2022-08-06 PROCEDURE — 93010 ELECTROCARDIOGRAM REPORT: CPT

## 2022-08-06 PROCEDURE — 36600 WITHDRAWAL OF ARTERIAL BLOOD: CPT | Performed by: EMERGENCY MEDICINE

## 2022-08-06 PROCEDURE — 99285 EMERGENCY DEPT VISIT HI MDM: CPT

## 2022-08-06 PROCEDURE — 85730 THROMBOPLASTIN TIME PARTIAL: CPT | Performed by: EMERGENCY MEDICINE

## 2022-08-06 PROCEDURE — 71045 X-RAY EXAM CHEST 1 VIEW: CPT | Performed by: EMERGENCY MEDICINE

## 2022-08-06 PROCEDURE — 85025 COMPLETE CBC W/AUTO DIFF WBC: CPT | Performed by: EMERGENCY MEDICINE

## 2022-08-06 PROCEDURE — 96375 TX/PRO/DX INJ NEW DRUG ADDON: CPT

## 2022-08-06 PROCEDURE — 83036 HEMOGLOBIN GLYCOSYLATED A1C: CPT | Performed by: HOSPITALIST

## 2022-08-06 PROCEDURE — 85610 PROTHROMBIN TIME: CPT | Performed by: EMERGENCY MEDICINE

## 2022-08-06 PROCEDURE — 82330 ASSAY OF CALCIUM: CPT | Performed by: EMERGENCY MEDICINE

## 2022-08-06 RX ORDER — FLUOXETINE HYDROCHLORIDE 20 MG/1
20 CAPSULE ORAL DAILY
Status: DISCONTINUED | OUTPATIENT
Start: 2022-08-06 | End: 2022-08-07

## 2022-08-06 RX ORDER — ALBUTEROL SULFATE 90 UG/1
2 AEROSOL, METERED RESPIRATORY (INHALATION) EVERY 6 HOURS PRN
Status: DISCONTINUED | OUTPATIENT
Start: 2022-08-06 | End: 2022-08-07

## 2022-08-06 RX ORDER — FUROSEMIDE 10 MG/ML
40 INJECTION INTRAMUSCULAR; INTRAVENOUS ONCE
Status: COMPLETED | OUTPATIENT
Start: 2022-08-06 | End: 2022-08-06

## 2022-08-06 RX ORDER — ISOSORBIDE MONONITRATE 30 MG/1
30 TABLET, EXTENDED RELEASE ORAL DAILY
Status: DISCONTINUED | OUTPATIENT
Start: 2022-08-06 | End: 2022-08-06

## 2022-08-06 RX ORDER — POTASSIUM CHLORIDE 20 MEQ/1
40 TABLET, EXTENDED RELEASE ORAL ONCE
Status: COMPLETED | OUTPATIENT
Start: 2022-08-06 | End: 2022-08-06

## 2022-08-06 RX ORDER — BUPROPION HYDROCHLORIDE 150 MG/1
150 TABLET, EXTENDED RELEASE ORAL 2 TIMES DAILY
Status: DISCONTINUED | OUTPATIENT
Start: 2022-08-06 | End: 2022-08-07

## 2022-08-06 RX ORDER — HYDRALAZINE HYDROCHLORIDE 20 MG/ML
10 INJECTION INTRAMUSCULAR; INTRAVENOUS ONCE
Status: COMPLETED | OUTPATIENT
Start: 2022-08-06 | End: 2022-08-06

## 2022-08-06 RX ORDER — ARIPIPRAZOLE 10 MG/1
10 TABLET ORAL DAILY
Status: DISCONTINUED | OUTPATIENT
Start: 2022-08-06 | End: 2022-08-07

## 2022-08-06 RX ORDER — NICOTINE POLACRILEX 4 MG
30 LOZENGE BUCCAL
Status: DISCONTINUED | OUTPATIENT
Start: 2022-08-06 | End: 2022-08-07

## 2022-08-06 RX ORDER — FUROSEMIDE 10 MG/ML
40 INJECTION INTRAMUSCULAR; INTRAVENOUS
Status: DISCONTINUED | OUTPATIENT
Start: 2022-08-06 | End: 2022-08-07

## 2022-08-06 RX ORDER — HYDRALAZINE HYDROCHLORIDE 20 MG/ML
20 INJECTION INTRAMUSCULAR; INTRAVENOUS ONCE
Status: DISCONTINUED | OUTPATIENT
Start: 2022-08-06 | End: 2022-08-06

## 2022-08-06 RX ORDER — ISOSORBIDE MONONITRATE 60 MG/1
60 TABLET, EXTENDED RELEASE ORAL DAILY
Status: DISCONTINUED | OUTPATIENT
Start: 2022-08-07 | End: 2022-08-07

## 2022-08-06 RX ORDER — METOPROLOL SUCCINATE 100 MG/1
100 TABLET, EXTENDED RELEASE ORAL
Status: DISCONTINUED | OUTPATIENT
Start: 2022-08-06 | End: 2022-08-07

## 2022-08-06 RX ORDER — NICOTINE POLACRILEX 4 MG
15 LOZENGE BUCCAL
Status: DISCONTINUED | OUTPATIENT
Start: 2022-08-06 | End: 2022-08-07

## 2022-08-06 RX ORDER — LAMOTRIGINE 150 MG/1
150 TABLET ORAL DAILY
Status: DISCONTINUED | OUTPATIENT
Start: 2022-08-06 | End: 2022-08-07

## 2022-08-06 RX ORDER — NITROGLYCERIN 20 MG/100ML
50 INJECTION INTRAVENOUS CONTINUOUS
Status: DISCONTINUED | OUTPATIENT
Start: 2022-08-06 | End: 2022-08-07

## 2022-08-06 RX ORDER — ACETAMINOPHEN 500 MG
500 TABLET ORAL EVERY 6 HOURS PRN
Status: DISCONTINUED | OUTPATIENT
Start: 2022-08-06 | End: 2022-08-07

## 2022-08-06 RX ORDER — MELATONIN
3 NIGHTLY PRN
Status: DISCONTINUED | OUTPATIENT
Start: 2022-08-06 | End: 2022-08-07

## 2022-08-06 RX ORDER — HYDRALAZINE HYDROCHLORIDE 50 MG/1
100 TABLET, FILM COATED ORAL 3 TIMES DAILY
Status: DISCONTINUED | OUTPATIENT
Start: 2022-08-06 | End: 2022-08-07

## 2022-08-06 RX ORDER — ONDANSETRON 2 MG/ML
4 INJECTION INTRAMUSCULAR; INTRAVENOUS EVERY 6 HOURS PRN
Status: DISCONTINUED | OUTPATIENT
Start: 2022-08-06 | End: 2022-08-07

## 2022-08-06 RX ORDER — DEXTROSE MONOHYDRATE 25 G/50ML
50 INJECTION, SOLUTION INTRAVENOUS
Status: DISCONTINUED | OUTPATIENT
Start: 2022-08-06 | End: 2022-08-07

## 2022-08-06 RX ORDER — HEPARIN SODIUM 5000 [USP'U]/ML
5000 INJECTION, SOLUTION INTRAVENOUS; SUBCUTANEOUS EVERY 8 HOURS SCHEDULED
Status: DISCONTINUED | OUTPATIENT
Start: 2022-08-06 | End: 2022-08-07

## 2022-08-06 RX ORDER — POLYETHYLENE GLYCOL 3350 17 G/17G
17 POWDER, FOR SOLUTION ORAL DAILY PRN
Status: DISCONTINUED | OUTPATIENT
Start: 2022-08-06 | End: 2022-08-07

## 2022-08-06 RX ORDER — SENNOSIDES 8.6 MG
17.2 TABLET ORAL NIGHTLY PRN
Status: DISCONTINUED | OUTPATIENT
Start: 2022-08-06 | End: 2022-08-07

## 2022-08-06 RX ORDER — ISOSORBIDE MONONITRATE 30 MG/1
30 TABLET, EXTENDED RELEASE ORAL ONCE
Status: COMPLETED | OUTPATIENT
Start: 2022-08-06 | End: 2022-08-06

## 2022-08-06 RX ORDER — BISACODYL 10 MG
10 SUPPOSITORY, RECTAL RECTAL
Status: DISCONTINUED | OUTPATIENT
Start: 2022-08-06 | End: 2022-08-07

## 2022-08-06 NOTE — PLAN OF CARE
Alert and oriented x4. On RA. Denies any chest pain. RAMSAY and at rest. B/P elevated, on nitroglycerin gtt. ST on tele. Continent of bowel and bladder. Denies pain at this time. Up with SBA. QID accucheck. Call light within reach. Cardiology to see.

## 2022-08-06 NOTE — ED INITIAL ASSESSMENT (HPI)
Pt arrived to ED with c/o SOB. Pt sts he thinks he is in fluid overload due to hx of CHF. Pt denies CP, dizziness, but c/o HA and blurred vision.  Pt is aox4

## 2022-08-06 NOTE — PLAN OF CARE
Pt has been sleeping most of the day, states he did not sleep well last night. BP decreased and back to normal parameters as the day progressed, nitroglycerin drip on hold. He voices no complaints. Calm and cooperative with care.

## 2022-08-07 VITALS
RESPIRATION RATE: 18 BRPM | SYSTOLIC BLOOD PRESSURE: 122 MMHG | BODY MASS INDEX: 33 KG/M2 | WEIGHT: 258.69 LBS | TEMPERATURE: 97 F | DIASTOLIC BLOOD PRESSURE: 74 MMHG | OXYGEN SATURATION: 92 % | HEART RATE: 82 BPM

## 2022-08-07 LAB
ALBUMIN SERPL-MCNC: 3.1 G/DL (ref 3.4–5)
ALBUMIN/GLOB SERPL: 0.9 {RATIO} (ref 1–2)
ALP LIVER SERPL-CCNC: 104 U/L
ALT SERPL-CCNC: 20 U/L
ANION GAP SERPL CALC-SCNC: 9 MMOL/L (ref 0–18)
AST SERPL-CCNC: 12 U/L (ref 15–37)
BASOPHILS # BLD AUTO: 0.12 X10(3) UL (ref 0–0.2)
BASOPHILS NFR BLD AUTO: 1.5 %
BILIRUB SERPL-MCNC: 0.5 MG/DL (ref 0.1–2)
BUN BLD-MCNC: 31 MG/DL (ref 7–18)
CALCIUM BLD-MCNC: 8.4 MG/DL (ref 8.5–10.1)
CHLORIDE SERPL-SCNC: 110 MMOL/L (ref 98–112)
CO2 SERPL-SCNC: 23 MMOL/L (ref 21–32)
CREAT BLD-MCNC: 2.87 MG/DL
EOSINOPHIL # BLD AUTO: 0.66 X10(3) UL (ref 0–0.7)
EOSINOPHIL NFR BLD AUTO: 8.2 %
ERYTHROCYTE [DISTWIDTH] IN BLOOD BY AUTOMATED COUNT: 13.2 %
GFR SERPLBLD BASED ON 1.73 SQ M-ARVRAT: 27 ML/MIN/1.73M2 (ref 60–?)
GLOBULIN PLAS-MCNC: 3.4 G/DL (ref 2.8–4.4)
GLUCOSE BLD-MCNC: 124 MG/DL (ref 70–99)
GLUCOSE BLD-MCNC: 130 MG/DL (ref 70–99)
GLUCOSE BLD-MCNC: 163 MG/DL (ref 70–99)
HCT VFR BLD AUTO: 44.1 %
HGB BLD-MCNC: 14.9 G/DL
IMM GRANULOCYTES # BLD AUTO: 0.02 X10(3) UL (ref 0–1)
IMM GRANULOCYTES NFR BLD: 0.2 %
LYMPHOCYTES # BLD AUTO: 2.65 X10(3) UL (ref 1–4)
LYMPHOCYTES NFR BLD AUTO: 32.9 %
MAGNESIUM SERPL-MCNC: 2 MG/DL (ref 1.6–2.6)
MCH RBC QN AUTO: 30.3 PG (ref 26–34)
MCHC RBC AUTO-ENTMCNC: 33.8 G/DL (ref 31–37)
MCV RBC AUTO: 89.8 FL
MONOCYTES # BLD AUTO: 0.74 X10(3) UL (ref 0.1–1)
MONOCYTES NFR BLD AUTO: 9.2 %
NEUTROPHILS # BLD AUTO: 3.87 X10 (3) UL (ref 1.5–7.7)
NEUTROPHILS # BLD AUTO: 3.87 X10(3) UL (ref 1.5–7.7)
NEUTROPHILS NFR BLD AUTO: 48 %
OSMOLALITY SERPL CALC.SUM OF ELEC: 302 MOSM/KG (ref 275–295)
PLATELET # BLD AUTO: 231 10(3)UL (ref 150–450)
POTASSIUM SERPL-SCNC: 2.6 MMOL/L (ref 3.5–5.1)
POTASSIUM SERPL-SCNC: 3.6 MMOL/L (ref 3.5–5.1)
PROT SERPL-MCNC: 6.5 G/DL (ref 6.4–8.2)
RBC # BLD AUTO: 4.91 X10(6)UL
SODIUM SERPL-SCNC: 142 MMOL/L (ref 136–145)
WBC # BLD AUTO: 8.1 X10(3) UL (ref 4–11)

## 2022-08-07 PROCEDURE — 84132 ASSAY OF SERUM POTASSIUM: CPT | Performed by: INTERNAL MEDICINE

## 2022-08-07 PROCEDURE — 80053 COMPREHEN METABOLIC PANEL: CPT | Performed by: HOSPITALIST

## 2022-08-07 PROCEDURE — 85025 COMPLETE CBC W/AUTO DIFF WBC: CPT | Performed by: HOSPITALIST

## 2022-08-07 PROCEDURE — 83735 ASSAY OF MAGNESIUM: CPT | Performed by: HOSPITALIST

## 2022-08-07 PROCEDURE — 82962 GLUCOSE BLOOD TEST: CPT

## 2022-08-07 RX ORDER — ISOSORBIDE MONONITRATE 60 MG/1
60 TABLET, EXTENDED RELEASE ORAL DAILY
Qty: 30 TABLET | Refills: 0 | Status: SHIPPED | OUTPATIENT
Start: 2022-08-08

## 2022-08-07 RX ORDER — EPLERENONE 25 MG/1
50 TABLET, FILM COATED ORAL DAILY
Status: DISCONTINUED | OUTPATIENT
Start: 2022-08-07 | End: 2022-08-07

## 2022-08-07 RX ORDER — POTASSIUM CHLORIDE 20 MEQ/1
40 TABLET, EXTENDED RELEASE ORAL ONCE
Status: COMPLETED | OUTPATIENT
Start: 2022-08-07 | End: 2022-08-07

## 2022-08-07 RX ORDER — FUROSEMIDE 40 MG/1
40 TABLET ORAL DAILY
Status: DISCONTINUED | OUTPATIENT
Start: 2022-08-08 | End: 2022-08-07

## 2022-08-07 NOTE — PLAN OF CARE
Assumed care of pt at 0730  A&Ox4, up ad osvaldo with a steady gait, no complaints of pain  R/A, NSR, no chest pain, pt does endorse dyspnea on exertion  Skin is clean, dry, and intact, no wounds present  Continent of bowel and bladder, last BM 8/5  Fall precautions in place, call light within reach, pt updated on plan of care, will continue to monitor    Discharge and medication education completed with pt   All questions answered regarding discharge, medications, and follow up appointments  Pt instructed to obtain BMP per hospitalist's orders at doctors appointment tomorrow  Pt transported to [facility] by staff for discharge home. Problem: CARDIOVASCULAR - ADULT  Goal: Maintains optimal cardiac output and hemodynamic stability  Description: INTERVENTIONS:  - Monitor vital signs, rhythm, and trends  - Monitor for bleeding, hypotension and signs of decreased cardiac output  - Evaluate effectiveness of vasoactive medications to optimize hemodynamic stability  - Monitor arterial and/or venous puncture sites for bleeding and/or hematoma  - Assess quality of pulses, skin color and temperature  - Assess for signs of decreased coronary artery perfusion - ex.  Angina  - Evaluate fluid balance, assess for edema, trend weights  Outcome: Progressing  Goal: Absence of cardiac arrhythmias or at baseline  Description: INTERVENTIONS:  - Continuous cardiac monitoring, monitor vital signs, obtain 12 lead EKG if indicated  - Evaluate effectiveness of antiarrhythmic and heart rate control medications as ordered  - Initiate emergency measures for life threatening arrhythmias  - Monitor electrolytes and administer replacement therapy as ordered  Outcome: Progressing     Problem: RESPIRATORY - ADULT  Goal: Achieves optimal ventilation and oxygenation  Description: INTERVENTIONS:  - Assess for changes in respiratory status  - Assess for changes in mentation and behavior  - Position to facilitate oxygenation and minimize respiratory effort  - Oxygen supplementation based on oxygen saturation or ABGs  - Provide Smoking Cessation handout, if applicable  - Encourage broncho-pulmonary hygiene including cough, deep breathe, Incentive Spirometry  - Assess the need for suctioning and perform as needed  - Assess and instruct to report SOB or any respiratory difficulty  - Respiratory Therapy support as indicated  - Manage/alleviate anxiety  - Monitor for signs/symptoms of CO2 retention  Outcome: Progressing

## 2022-08-07 NOTE — PROGRESS NOTES
/renal paged for K level 2.6 this morning. Awaiting for return call. Will endorse to incoming RN. Pt resting comfortably.

## 2022-08-07 NOTE — PROGRESS NOTES
Alert. Oriented. Sr/s.tach per tele. Denies pain, sob. Up ad osvaldo. ntg gtt was weaned off at 1600. sbp 130s. poc updated and discussed with pt. Voiced understanding. Cont. to monitor pt.

## 2022-08-16 ENCOUNTER — LAB ENCOUNTER (OUTPATIENT)
Dept: LAB | Facility: HOSPITAL | Age: 42
End: 2022-08-16
Attending: SURGERY
Payer: MEDICAID

## 2022-08-16 ENCOUNTER — ANESTHESIA EVENT (OUTPATIENT)
Dept: SURGERY | Facility: HOSPITAL | Age: 42
End: 2022-08-16
Payer: MEDICAID

## 2022-08-16 DIAGNOSIS — Z01.818 PRE-OP TESTING: ICD-10-CM

## 2022-08-16 LAB — SARS-COV-2 RNA RESP QL NAA+PROBE: NOT DETECTED

## 2022-08-17 ENCOUNTER — ANESTHESIA (OUTPATIENT)
Dept: SURGERY | Facility: HOSPITAL | Age: 42
End: 2022-08-17
Payer: MEDICAID

## 2022-08-17 ENCOUNTER — HOSPITAL ENCOUNTER (OUTPATIENT)
Facility: HOSPITAL | Age: 42
Setting detail: HOSPITAL OUTPATIENT SURGERY
Discharge: HOME | End: 2022-08-17
Attending: SURGERY | Admitting: SURGERY
Payer: MEDICAID

## 2022-08-17 VITALS
HEART RATE: 82 BPM | BODY MASS INDEX: 32.96 KG/M2 | WEIGHT: 256.81 LBS | HEIGHT: 74 IN | OXYGEN SATURATION: 93 % | DIASTOLIC BLOOD PRESSURE: 94 MMHG | TEMPERATURE: 98 F | SYSTOLIC BLOOD PRESSURE: 140 MMHG | RESPIRATION RATE: 16 BRPM

## 2022-08-17 DIAGNOSIS — Z01.818 PRE-OP TESTING: ICD-10-CM

## 2022-08-17 DIAGNOSIS — Z20.822 ENCOUNTER FOR PREOPERATIVE SCREENING LABORATORY TESTING FOR COVID-19 VIRUS: Primary | ICD-10-CM

## 2022-08-17 DIAGNOSIS — Z01.812 ENCOUNTER FOR PREOPERATIVE SCREENING LABORATORY TESTING FOR COVID-19 VIRUS: Primary | ICD-10-CM

## 2022-08-17 LAB
GLUCOSE BLD-MCNC: 145 MG/DL (ref 70–99)
GLUCOSE BLD-MCNC: 153 MG/DL (ref 70–99)

## 2022-08-17 PROCEDURE — 0WUF4JZ SUPPLEMENT ABDOMINAL WALL WITH SYNTHETIC SUBSTITUTE, PERCUTANEOUS ENDOSCOPIC APPROACH: ICD-10-PCS | Performed by: SURGERY

## 2022-08-17 PROCEDURE — 82962 GLUCOSE BLOOD TEST: CPT

## 2022-08-17 PROCEDURE — 8E0W4CZ ROBOTIC ASSISTED PROCEDURE OF TRUNK REGION, PERCUTANEOUS ENDOSCOPIC APPROACH: ICD-10-PCS | Performed by: SURGERY

## 2022-08-17 DEVICE — VENTRIO ST HERNIA PATCH
Type: IMPLANTABLE DEVICE | Site: ABDOMEN | Status: FUNCTIONAL
Brand: VENTRIO ST HERNIA PATCH

## 2022-08-17 RX ORDER — METOCLOPRAMIDE HYDROCHLORIDE 5 MG/ML
INJECTION INTRAMUSCULAR; INTRAVENOUS AS NEEDED
Status: DISCONTINUED | OUTPATIENT
Start: 2022-08-17 | End: 2022-08-17 | Stop reason: SURG

## 2022-08-17 RX ORDER — HYDROMORPHONE HYDROCHLORIDE 1 MG/ML
0.2 INJECTION, SOLUTION INTRAMUSCULAR; INTRAVENOUS; SUBCUTANEOUS EVERY 5 MIN PRN
Status: DISCONTINUED | OUTPATIENT
Start: 2022-08-17 | End: 2022-08-17

## 2022-08-17 RX ORDER — DIPHENHYDRAMINE HYDROCHLORIDE 50 MG/ML
12.5 INJECTION INTRAMUSCULAR; INTRAVENOUS AS NEEDED
Status: DISCONTINUED | OUTPATIENT
Start: 2022-08-17 | End: 2022-08-17

## 2022-08-17 RX ORDER — ONDANSETRON 2 MG/ML
4 INJECTION INTRAMUSCULAR; INTRAVENOUS EVERY 6 HOURS PRN
Status: DISCONTINUED | OUTPATIENT
Start: 2022-08-17 | End: 2022-08-17

## 2022-08-17 RX ORDER — LIDOCAINE HYDROCHLORIDE 10 MG/ML
INJECTION, SOLUTION INFILTRATION; PERINEURAL AS NEEDED
Status: DISCONTINUED | OUTPATIENT
Start: 2022-08-17 | End: 2022-08-17 | Stop reason: HOSPADM

## 2022-08-17 RX ORDER — TRAMADOL HYDROCHLORIDE 50 MG/1
50 TABLET ORAL EVERY 6 HOURS PRN
Qty: 30 TABLET | Refills: 0 | Status: SHIPPED | OUTPATIENT
Start: 2022-08-17 | End: 2022-08-27

## 2022-08-17 RX ORDER — DEXTROSE MONOHYDRATE 25 G/50ML
50 INJECTION, SOLUTION INTRAVENOUS
Status: DISCONTINUED | OUTPATIENT
Start: 2022-08-17 | End: 2022-08-17 | Stop reason: HOSPADM

## 2022-08-17 RX ORDER — SODIUM CHLORIDE, SODIUM LACTATE, POTASSIUM CHLORIDE, CALCIUM CHLORIDE 600; 310; 30; 20 MG/100ML; MG/100ML; MG/100ML; MG/100ML
INJECTION, SOLUTION INTRAVENOUS CONTINUOUS
Status: DISCONTINUED | OUTPATIENT
Start: 2022-08-17 | End: 2022-08-17

## 2022-08-17 RX ORDER — ALBUTEROL SULFATE 2.5 MG/3ML
2.5 SOLUTION RESPIRATORY (INHALATION) CONTINUOUS
OUTPATIENT
Start: 2022-08-17

## 2022-08-17 RX ORDER — HYDROMORPHONE HYDROCHLORIDE 1 MG/ML
0.6 INJECTION, SOLUTION INTRAMUSCULAR; INTRAVENOUS; SUBCUTANEOUS EVERY 5 MIN PRN
Status: DISCONTINUED | OUTPATIENT
Start: 2022-08-17 | End: 2022-08-17

## 2022-08-17 RX ORDER — HYDROCODONE BITARTRATE AND ACETAMINOPHEN 5; 325 MG/1; MG/1
1 TABLET ORAL ONCE AS NEEDED
Status: DISCONTINUED | OUTPATIENT
Start: 2022-08-17 | End: 2022-08-17

## 2022-08-17 RX ORDER — HYDROCODONE BITARTRATE AND ACETAMINOPHEN 5; 325 MG/1; MG/1
2 TABLET ORAL ONCE AS NEEDED
Status: DISCONTINUED | OUTPATIENT
Start: 2022-08-17 | End: 2022-08-17

## 2022-08-17 RX ORDER — PROCHLORPERAZINE EDISYLATE 5 MG/ML
5 INJECTION INTRAMUSCULAR; INTRAVENOUS EVERY 8 HOURS PRN
Status: DISCONTINUED | OUTPATIENT
Start: 2022-08-17 | End: 2022-08-17

## 2022-08-17 RX ORDER — MORPHINE SULFATE 4 MG/ML
1 INJECTION, SOLUTION INTRAMUSCULAR; INTRAVENOUS EVERY 2 HOUR PRN
Status: DISCONTINUED | OUTPATIENT
Start: 2022-08-17 | End: 2022-08-17

## 2022-08-17 RX ORDER — LABETALOL HYDROCHLORIDE 5 MG/ML
10 INJECTION, SOLUTION INTRAVENOUS ONCE
Status: DISCONTINUED | OUTPATIENT
Start: 2022-08-17 | End: 2022-08-17

## 2022-08-17 RX ORDER — NICOTINE POLACRILEX 4 MG
15 LOZENGE BUCCAL
Status: DISCONTINUED | OUTPATIENT
Start: 2022-08-17 | End: 2022-08-17 | Stop reason: HOSPADM

## 2022-08-17 RX ORDER — ACETAMINOPHEN 500 MG
1000 TABLET ORAL ONCE AS NEEDED
Status: DISCONTINUED | OUTPATIENT
Start: 2022-08-17 | End: 2022-08-17

## 2022-08-17 RX ORDER — ALBUTEROL SULFATE 2.5 MG/3ML
2.5 SOLUTION RESPIRATORY (INHALATION) ONCE
Status: COMPLETED | OUTPATIENT
Start: 2022-08-17 | End: 2022-08-17

## 2022-08-17 RX ORDER — MIDAZOLAM HYDROCHLORIDE 1 MG/ML
INJECTION INTRAMUSCULAR; INTRAVENOUS AS NEEDED
Status: DISCONTINUED | OUTPATIENT
Start: 2022-08-17 | End: 2022-08-17 | Stop reason: SURG

## 2022-08-17 RX ORDER — PHENYLEPHRINE HCL 10 MG/ML
VIAL (ML) INJECTION AS NEEDED
Status: DISCONTINUED | OUTPATIENT
Start: 2022-08-17 | End: 2022-08-17 | Stop reason: SURG

## 2022-08-17 RX ORDER — ALBUTEROL SULFATE 2.5 MG/3ML
SOLUTION RESPIRATORY (INHALATION)
Status: COMPLETED
Start: 2022-08-17 | End: 2022-08-17

## 2022-08-17 RX ORDER — BUPIVACAINE HYDROCHLORIDE 5 MG/ML
INJECTION, SOLUTION EPIDURAL; INTRACAUDAL AS NEEDED
Status: DISCONTINUED | OUTPATIENT
Start: 2022-08-17 | End: 2022-08-17 | Stop reason: HOSPADM

## 2022-08-17 RX ORDER — CEFAZOLIN SODIUM/WATER 2 G/20 ML
2 SYRINGE (ML) INTRAVENOUS ONCE
Status: DISCONTINUED | OUTPATIENT
Start: 2022-08-17 | End: 2022-08-17 | Stop reason: HOSPADM

## 2022-08-17 RX ORDER — MORPHINE SULFATE 4 MG/ML
2 INJECTION, SOLUTION INTRAMUSCULAR; INTRAVENOUS EVERY 2 HOUR PRN
Status: DISCONTINUED | OUTPATIENT
Start: 2022-08-17 | End: 2022-08-17

## 2022-08-17 RX ORDER — CEFAZOLIN SODIUM 1 G/3ML
INJECTION, POWDER, FOR SOLUTION INTRAMUSCULAR; INTRAVENOUS AS NEEDED
Status: DISCONTINUED | OUTPATIENT
Start: 2022-08-17 | End: 2022-08-17 | Stop reason: SURG

## 2022-08-17 RX ORDER — ROCURONIUM BROMIDE 10 MG/ML
INJECTION, SOLUTION INTRAVENOUS AS NEEDED
Status: DISCONTINUED | OUTPATIENT
Start: 2022-08-17 | End: 2022-08-17 | Stop reason: SURG

## 2022-08-17 RX ORDER — CEFAZOLIN SODIUM/WATER 2 G/20 ML
SYRINGE (ML) INTRAVENOUS
Status: DISCONTINUED
Start: 2022-08-17 | End: 2022-08-17

## 2022-08-17 RX ORDER — CEFAZOLIN SODIUM/WATER 2 G/20 ML
SYRINGE (ML) INTRAVENOUS AS NEEDED
Status: DISCONTINUED | OUTPATIENT
Start: 2022-08-17 | End: 2022-08-17 | Stop reason: SURG

## 2022-08-17 RX ORDER — SCOLOPAMINE TRANSDERMAL SYSTEM 1 MG/1
1 PATCH, EXTENDED RELEASE TRANSDERMAL ONCE
Status: DISCONTINUED | OUTPATIENT
Start: 2022-08-17 | End: 2022-08-17 | Stop reason: HOSPADM

## 2022-08-17 RX ORDER — NICOTINE POLACRILEX 4 MG
30 LOZENGE BUCCAL
Status: DISCONTINUED | OUTPATIENT
Start: 2022-08-17 | End: 2022-08-17 | Stop reason: HOSPADM

## 2022-08-17 RX ORDER — NALOXONE HYDROCHLORIDE 0.4 MG/ML
80 INJECTION, SOLUTION INTRAMUSCULAR; INTRAVENOUS; SUBCUTANEOUS AS NEEDED
Status: DISCONTINUED | OUTPATIENT
Start: 2022-08-17 | End: 2022-08-17

## 2022-08-17 RX ORDER — MORPHINE SULFATE 4 MG/ML
4 INJECTION, SOLUTION INTRAMUSCULAR; INTRAVENOUS EVERY 2 HOUR PRN
Status: DISCONTINUED | OUTPATIENT
Start: 2022-08-17 | End: 2022-08-17

## 2022-08-17 RX ORDER — MIDAZOLAM HYDROCHLORIDE 1 MG/ML
1 INJECTION INTRAMUSCULAR; INTRAVENOUS EVERY 5 MIN PRN
Status: DISCONTINUED | OUTPATIENT
Start: 2022-08-17 | End: 2022-08-17

## 2022-08-17 RX ORDER — HYDROMORPHONE HYDROCHLORIDE 1 MG/ML
0.4 INJECTION, SOLUTION INTRAMUSCULAR; INTRAVENOUS; SUBCUTANEOUS EVERY 5 MIN PRN
Status: DISCONTINUED | OUTPATIENT
Start: 2022-08-17 | End: 2022-08-17

## 2022-08-17 RX ORDER — ACETAMINOPHEN 500 MG
1000 TABLET ORAL ONCE
Status: DISCONTINUED | OUTPATIENT
Start: 2022-08-17 | End: 2022-08-17 | Stop reason: HOSPADM

## 2022-08-17 RX ORDER — MORPHINE SULFATE 4 MG/ML
INJECTION, SOLUTION INTRAMUSCULAR; INTRAVENOUS
Status: COMPLETED
Start: 2022-08-17 | End: 2022-08-17

## 2022-08-17 RX ORDER — ONDANSETRON 2 MG/ML
INJECTION INTRAMUSCULAR; INTRAVENOUS AS NEEDED
Status: DISCONTINUED | OUTPATIENT
Start: 2022-08-17 | End: 2022-08-17 | Stop reason: SURG

## 2022-08-17 RX ORDER — METOPROLOL TARTRATE 5 MG/5ML
2.5 INJECTION INTRAVENOUS ONCE
Status: DISCONTINUED | OUTPATIENT
Start: 2022-08-17 | End: 2022-08-17

## 2022-08-17 RX ADMIN — SODIUM CHLORIDE, SODIUM LACTATE, POTASSIUM CHLORIDE, CALCIUM CHLORIDE: 600; 310; 30; 20 INJECTION, SOLUTION INTRAVENOUS at 12:32:00

## 2022-08-17 RX ADMIN — MIDAZOLAM HYDROCHLORIDE 2 MG: 1 INJECTION INTRAMUSCULAR; INTRAVENOUS at 12:32:00

## 2022-08-17 RX ADMIN — CEFAZOLIN SODIUM/WATER 2 G: 2 G/20 ML SYRINGE (ML) INTRAVENOUS at 12:41:00

## 2022-08-17 RX ADMIN — ROCURONIUM BROMIDE 5 MG: 10 INJECTION, SOLUTION INTRAVENOUS at 13:14:00

## 2022-08-17 RX ADMIN — PHENYLEPHRINE HCL 100 MCG: 10 MG/ML VIAL (ML) INJECTION at 13:31:00

## 2022-08-17 RX ADMIN — ONDANSETRON 4 MG: 2 INJECTION INTRAMUSCULAR; INTRAVENOUS at 13:35:00

## 2022-08-17 RX ADMIN — METOCLOPRAMIDE HYDROCHLORIDE 20 MG: 5 INJECTION INTRAMUSCULAR; INTRAVENOUS at 12:59:00

## 2022-08-17 RX ADMIN — PHENYLEPHRINE HCL 100 MCG: 10 MG/ML VIAL (ML) INJECTION at 12:57:00

## 2022-08-17 RX ADMIN — ROCURONIUM BROMIDE 10 MG: 10 INJECTION, SOLUTION INTRAVENOUS at 12:43:00

## 2022-08-17 RX ADMIN — CEFAZOLIN SODIUM 1 G: 1 INJECTION, POWDER, FOR SOLUTION INTRAMUSCULAR; INTRAVENOUS at 12:43:00

## 2022-08-17 RX ADMIN — SODIUM CHLORIDE, SODIUM LACTATE, POTASSIUM CHLORIDE, CALCIUM CHLORIDE: 600; 310; 30; 20 INJECTION, SOLUTION INTRAVENOUS at 14:01:00

## 2022-08-17 RX ADMIN — ROCURONIUM BROMIDE 20 MG: 10 INJECTION, SOLUTION INTRAVENOUS at 12:48:00

## 2022-08-17 RX ADMIN — PHENYLEPHRINE HCL 100 MCG: 10 MG/ML VIAL (ML) INJECTION at 13:14:00

## 2022-08-17 RX ADMIN — PHENYLEPHRINE HCL 100 MCG: 10 MG/ML VIAL (ML) INJECTION at 12:59:00

## 2022-08-17 RX ADMIN — PHENYLEPHRINE HCL 100 MCG: 10 MG/ML VIAL (ML) INJECTION at 13:28:00

## 2022-08-17 RX ADMIN — SODIUM CHLORIDE, SODIUM LACTATE, POTASSIUM CHLORIDE, CALCIUM CHLORIDE: 600; 310; 30; 20 INJECTION, SOLUTION INTRAVENOUS at 13:29:00

## 2022-08-17 RX ADMIN — ROCURONIUM BROMIDE 5 MG: 10 INJECTION, SOLUTION INTRAVENOUS at 12:34:00

## 2022-08-17 NOTE — ANESTHESIA PROCEDURE NOTES
Airway  Date/Time: 8/17/2022 12:37 PM  Urgency: elective    Airway not difficult    General Information and Staff    Patient location during procedure: OR  Anesthesiologist: Bryson Stephenson MD  Performed: anesthesiologist     Indications and Patient Condition  Indications for airway management: anesthesia  Sedation level: deep  Preoxygenated: yes  Patient position: sniffing  Mask difficulty assessment: 2 - vent by mask + OA or adjuvant +/- NMBA    Final Airway Details  Final airway type: endotracheal airway      Successful airway: ETT  Cuffed: yes   Successful intubation technique: direct laryngoscopy  Endotracheal tube insertion site: oral  Blade: Samir  Blade size: #3  ETT size (mm): 7.5    Cormack-Lehane Classification: grade IIA - partial view of glottis  Placement verified by: chest auscultation and capnometry   Measured from: lips  ETT to lips (cm): 22  Number of attempts at approach: 1    Additional Comments   OETT placed without airway or dental trauma.

## 2022-08-17 NOTE — OPERATIVE REPORT
[facility]                                                         Operative Note    [facility] Location: [facility]   CSN [id number] MRN [id number]   Admission Date 8/17/2022 Procedure Date 8/17/2022   Attending Physician Carol Tong MD Procedure Physician Aniyah Oglesby MD     Pre-Operative Diagnosis: UMBILICAL HERNIA     Post-Operative Diagnosis: UMBILICAL HERNIA    Procedure Performed: XI ROBOT-ASSISTED LAPAROSCOPIC REPAIR UMBILICAL HERNIA WITH MESH    Surgeon: Aniyah Oglesby MD     Assistant(s):  Surgical Assistant.: YSABEL Baum       Anesthesia: General       Complications: none       Estimated Blood Loss: Blood Output: 5 mL (8/17/2022  1:39 PM)              Operative Summary: Patient was taken to the operating room and placed in a supine position. The OR table was flexed for positioning. The patient was placed under general anesthesia and prepped and draped. A Veress needle was inserted above the umbilicus and the abdomen was insufflated with 15 mm of carbon dioxide. 3 trochars were placed along the left side of the abdomen. The [employer] robot was brought into position and docked. Instrumentation was placed. The peritoneum was incised and reflected down exposing the anterior abdominal wall and the preperitoneal space. The hernia was appreciated involving the region near the umbilicus. The hernia contents were reduced exposing the fascial edges. The fascial edges were reapproximated with a O VLOCK suture. This was performed in 2 layers. A piece of 7.6 cm ventrio mesh was then introduced to the abdominal cavity and placed in position. It was secured to the anterior abdominal wall with additional O VOCK sutures. Once this was accomplished the peritoneum was reflected back over the mesh and reapproximated.   A bilateral trans-abdominus plane block was then performed by infusing 30 cc of a mixture of half percent Marcaine and 1% Xylocaine in the transabdominal plane bilaterally. The trochars were removed. The patient was awoken and taken to the recovery room in satisfactory condition. There were no complications during the case.           Yesy Bravo MD  8/17/2022  1:44 PM

## 2022-08-17 NOTE — ADDENDUM NOTE
Addendum  created 08/17/22 1430 by Nano Long MD    Intraprocedure Event edited, Intraprocedure Meds edited

## 2022-08-31 ENCOUNTER — HOSPITAL ENCOUNTER (INPATIENT)
Facility: HOSPITAL | Age: 42
LOS: 3 days | Discharge: HOME | End: 2022-09-03
Attending: EMERGENCY MEDICINE | Admitting: HOSPITALIST

## 2022-08-31 ENCOUNTER — HOSPITAL ENCOUNTER (INPATIENT)
Facility: HOSPITAL | Age: 42
LOS: 3 days | Discharge: HOME | DRG: 291 | End: 2022-09-03
Attending: EMERGENCY MEDICINE | Admitting: HOSPITALIST

## 2022-08-31 ENCOUNTER — APPOINTMENT (OUTPATIENT)
Dept: GENERAL RADIOLOGY | Age: 42
End: 2022-08-31
Attending: EMERGENCY MEDICINE

## 2022-08-31 ENCOUNTER — APPOINTMENT (OUTPATIENT)
Dept: GENERAL RADIOLOGY | Age: 42
DRG: 291 | End: 2022-08-31
Attending: EMERGENCY MEDICINE

## 2022-08-31 DIAGNOSIS — R77.8 ELEVATED TROPONIN: ICD-10-CM

## 2022-08-31 DIAGNOSIS — N18.9 CHRONIC KIDNEY DISEASE, UNSPECIFIED CKD STAGE: ICD-10-CM

## 2022-08-31 DIAGNOSIS — I50.9 ACUTE CONGESTIVE HEART FAILURE, UNSPECIFIED HEART FAILURE TYPE (HCC): Primary | ICD-10-CM

## 2022-08-31 DIAGNOSIS — I10 HYPERTENSION, UNSPECIFIED TYPE: ICD-10-CM

## 2022-08-31 LAB
ALBUMIN SERPL-MCNC: 3.1 G/DL (ref 3.4–5)
ALBUMIN/GLOB SERPL: 0.8 {RATIO} (ref 1–2)
ALP LIVER SERPL-CCNC: 122 U/L
ALT SERPL-CCNC: 27 U/L
ANION GAP SERPL CALC-SCNC: 9 MMOL/L (ref 0–18)
AST SERPL-CCNC: 16 U/L (ref 15–37)
BASOPHILS # BLD AUTO: 0.13 X10(3) UL (ref 0–0.2)
BASOPHILS NFR BLD AUTO: 1.1 %
BILIRUB SERPL-MCNC: 0.4 MG/DL (ref 0.1–2)
BUN BLD-MCNC: 39 MG/DL (ref 7–18)
CALCIUM BLD-MCNC: 9.1 MG/DL (ref 8.5–10.1)
CHLORIDE SERPL-SCNC: 107 MMOL/L (ref 98–112)
CHOLEST SERPL-MCNC: 224 MG/DL (ref ?–200)
CO2 SERPL-SCNC: 23 MMOL/L (ref 21–32)
CREAT BLD-MCNC: 3.11 MG/DL
EOSINOPHIL # BLD AUTO: 0.43 X10(3) UL (ref 0–0.7)
EOSINOPHIL NFR BLD AUTO: 3.7 %
ERYTHROCYTE [DISTWIDTH] IN BLOOD BY AUTOMATED COUNT: 13.3 %
GFR SERPLBLD BASED ON 1.73 SQ M-ARVRAT: 24 ML/MIN/1.73M2 (ref 60–?)
GLOBULIN PLAS-MCNC: 4 G/DL (ref 2.8–4.4)
GLUCOSE BLD-MCNC: 124 MG/DL (ref 70–99)
GLUCOSE BLD-MCNC: 125 MG/DL (ref 70–99)
GLUCOSE BLD-MCNC: 145 MG/DL (ref 70–99)
HCT VFR BLD AUTO: 42.7 %
HDLC SERPL-MCNC: 39 MG/DL (ref 40–59)
HGB BLD-MCNC: 14.3 G/DL
IMM GRANULOCYTES # BLD AUTO: 0.03 X10(3) UL (ref 0–1)
IMM GRANULOCYTES NFR BLD: 0.3 %
LDLC SERPL CALC-MCNC: 110 MG/DL (ref ?–100)
LYMPHOCYTES # BLD AUTO: 2.93 X10(3) UL (ref 1–4)
LYMPHOCYTES NFR BLD AUTO: 25.4 %
MCH RBC QN AUTO: 29.1 PG (ref 26–34)
MCHC RBC AUTO-ENTMCNC: 33.5 G/DL (ref 31–37)
MCV RBC AUTO: 87 FL
MONOCYTES # BLD AUTO: 0.88 X10(3) UL (ref 0.1–1)
MONOCYTES NFR BLD AUTO: 7.6 %
NEUTROPHILS # BLD AUTO: 7.14 X10 (3) UL (ref 1.5–7.7)
NEUTROPHILS # BLD AUTO: 7.14 X10(3) UL (ref 1.5–7.7)
NEUTROPHILS NFR BLD AUTO: 61.9 %
NONHDLC SERPL-MCNC: 185 MG/DL (ref ?–130)
NT-PROBNP SERPL-MCNC: 4439 PG/ML (ref ?–125)
OSMOLALITY SERPL CALC.SUM OF ELEC: 299 MOSM/KG (ref 275–295)
PLATELET # BLD AUTO: 343 10(3)UL (ref 150–450)
POTASSIUM SERPL-SCNC: 3.5 MMOL/L (ref 3.5–5.1)
PROCALCITONIN SERPL-MCNC: 0.22 NG/ML (ref ?–0.16)
PROT SERPL-MCNC: 7.1 G/DL (ref 6.4–8.2)
RBC # BLD AUTO: 4.91 X10(6)UL
SARS-COV-2 RNA RESP QL NAA+PROBE: NOT DETECTED
SARS-COV-2 RNA RESP QL NAA+PROBE: NOT DETECTED
SODIUM SERPL-SCNC: 139 MMOL/L (ref 136–145)
TRIGL SERPL-MCNC: 436 MG/DL (ref 30–149)
TROPONIN I HIGH SENSITIVITY: 579 NG/L
VLDLC SERPL CALC-MCNC: 77 MG/DL (ref 0–30)
WBC # BLD AUTO: 11.5 X10(3) UL (ref 4–11)

## 2022-08-31 PROCEDURE — 99285 EMERGENCY DEPT VISIT HI MDM: CPT

## 2022-08-31 PROCEDURE — 80061 LIPID PANEL: CPT | Performed by: EMERGENCY MEDICINE

## 2022-08-31 PROCEDURE — 93010 ELECTROCARDIOGRAM REPORT: CPT

## 2022-08-31 PROCEDURE — 83880 ASSAY OF NATRIURETIC PEPTIDE: CPT | Performed by: EMERGENCY MEDICINE

## 2022-08-31 PROCEDURE — 93005 ELECTROCARDIOGRAM TRACING: CPT

## 2022-08-31 PROCEDURE — 84145 PROCALCITONIN (PCT): CPT | Performed by: EMERGENCY MEDICINE

## 2022-08-31 PROCEDURE — 80053 COMPREHEN METABOLIC PANEL: CPT | Performed by: EMERGENCY MEDICINE

## 2022-08-31 PROCEDURE — 82962 GLUCOSE BLOOD TEST: CPT

## 2022-08-31 PROCEDURE — 85025 COMPLETE CBC W/AUTO DIFF WBC: CPT | Performed by: EMERGENCY MEDICINE

## 2022-08-31 PROCEDURE — 71045 X-RAY EXAM CHEST 1 VIEW: CPT | Performed by: EMERGENCY MEDICINE

## 2022-08-31 PROCEDURE — 96374 THER/PROPH/DIAG INJ IV PUSH: CPT

## 2022-08-31 PROCEDURE — 84484 ASSAY OF TROPONIN QUANT: CPT | Performed by: EMERGENCY MEDICINE

## 2022-08-31 RX ORDER — NICOTINE POLACRILEX 4 MG
15 LOZENGE BUCCAL
Status: DISCONTINUED | OUTPATIENT
Start: 2022-08-31 | End: 2022-09-03

## 2022-08-31 RX ORDER — FLUTICASONE PROPIONATE 50 MCG
1 SPRAY, SUSPENSION (ML) NASAL 2 TIMES DAILY PRN
Status: DISCONTINUED | OUTPATIENT
Start: 2022-08-31 | End: 2022-09-03

## 2022-08-31 RX ORDER — DEXTROSE MONOHYDRATE 25 G/50ML
50 INJECTION, SOLUTION INTRAVENOUS
Status: DISCONTINUED | OUTPATIENT
Start: 2022-08-31 | End: 2022-09-03

## 2022-08-31 RX ORDER — FLUOXETINE HYDROCHLORIDE 20 MG/1
20 CAPSULE ORAL DAILY
Status: DISCONTINUED | OUTPATIENT
Start: 2022-09-01 | End: 2022-09-03

## 2022-08-31 RX ORDER — ACETAMINOPHEN 500 MG
500 TABLET ORAL EVERY 4 HOURS PRN
Status: DISCONTINUED | OUTPATIENT
Start: 2022-08-31 | End: 2022-09-03

## 2022-08-31 RX ORDER — HEPARIN SODIUM 5000 [USP'U]/ML
5000 INJECTION, SOLUTION INTRAVENOUS; SUBCUTANEOUS EVERY 8 HOURS SCHEDULED
Status: DISCONTINUED | OUTPATIENT
Start: 2022-08-31 | End: 2022-09-03

## 2022-08-31 RX ORDER — LAMOTRIGINE 100 MG/1
100 TABLET ORAL DAILY
Status: DISCONTINUED | OUTPATIENT
Start: 2022-09-01 | End: 2022-08-31

## 2022-08-31 RX ORDER — FLUOXETINE HYDROCHLORIDE 20 MG/1
40 CAPSULE ORAL DAILY
Status: DISCONTINUED | OUTPATIENT
Start: 2022-09-01 | End: 2022-08-31

## 2022-08-31 RX ORDER — ISOSORBIDE MONONITRATE 60 MG/1
60 TABLET, EXTENDED RELEASE ORAL DAILY
Status: DISCONTINUED | OUTPATIENT
Start: 2022-09-01 | End: 2022-09-03

## 2022-08-31 RX ORDER — ALBUTEROL SULFATE 90 UG/1
2 AEROSOL, METERED RESPIRATORY (INHALATION) EVERY 6 HOURS PRN
Status: DISCONTINUED | OUTPATIENT
Start: 2022-08-31 | End: 2022-09-03

## 2022-08-31 RX ORDER — LAMOTRIGINE 150 MG/1
150 TABLET ORAL DAILY
Status: DISCONTINUED | OUTPATIENT
Start: 2022-09-01 | End: 2022-09-03

## 2022-08-31 RX ORDER — DEXTROAMPHETAMINE SACCHARATE, AMPHETAMINE ASPARTATE, DEXTROAMPHETAMINE SULFATE AND AMPHETAMINE SULFATE 2.5; 2.5; 2.5; 2.5 MG/1; MG/1; MG/1; MG/1
10 TABLET ORAL 2 TIMES DAILY
Status: DISCONTINUED | OUTPATIENT
Start: 2022-09-01 | End: 2022-09-03

## 2022-08-31 RX ORDER — METOPROLOL SUCCINATE 100 MG/1
100 TABLET, EXTENDED RELEASE ORAL DAILY
Status: DISCONTINUED | OUTPATIENT
Start: 2022-09-01 | End: 2022-08-31

## 2022-08-31 RX ORDER — NICOTINE POLACRILEX 4 MG
30 LOZENGE BUCCAL
Status: DISCONTINUED | OUTPATIENT
Start: 2022-08-31 | End: 2022-09-03

## 2022-08-31 RX ORDER — METOPROLOL SUCCINATE 100 MG/1
100 TABLET, EXTENDED RELEASE ORAL DAILY
Status: DISCONTINUED | OUTPATIENT
Start: 2022-08-31 | End: 2022-09-01

## 2022-08-31 RX ORDER — ONDANSETRON 2 MG/ML
4 INJECTION INTRAMUSCULAR; INTRAVENOUS EVERY 6 HOURS PRN
Status: DISCONTINUED | OUTPATIENT
Start: 2022-08-31 | End: 2022-09-03

## 2022-08-31 RX ORDER — ALBUTEROL SULFATE 2.5 MG/3ML
2.5 SOLUTION RESPIRATORY (INHALATION) CONTINUOUS
Status: DISCONTINUED | OUTPATIENT
Start: 2022-08-31 | End: 2022-08-31

## 2022-08-31 RX ORDER — ACETAMINOPHEN 500 MG
500 TABLET ORAL EVERY 6 HOURS PRN
Status: DISCONTINUED | OUTPATIENT
Start: 2022-08-31 | End: 2022-09-03

## 2022-08-31 RX ORDER — TRAMADOL HYDROCHLORIDE 50 MG/1
50 TABLET ORAL ONCE
Status: COMPLETED | OUTPATIENT
Start: 2022-08-31 | End: 2022-08-31

## 2022-08-31 RX ORDER — PROCHLORPERAZINE EDISYLATE 5 MG/ML
5 INJECTION INTRAMUSCULAR; INTRAVENOUS EVERY 8 HOURS PRN
Status: DISCONTINUED | OUTPATIENT
Start: 2022-08-31 | End: 2022-09-03

## 2022-08-31 RX ORDER — ARIPIPRAZOLE 10 MG/1
10 TABLET ORAL DAILY
Status: DISCONTINUED | OUTPATIENT
Start: 2022-09-01 | End: 2022-08-31

## 2022-08-31 RX ORDER — POTASSIUM CHLORIDE 20 MEQ/1
20 TABLET, EXTENDED RELEASE ORAL ONCE
Status: COMPLETED | OUTPATIENT
Start: 2022-08-31 | End: 2022-08-31

## 2022-08-31 RX ORDER — EPLERENONE 25 MG/1
50 TABLET, FILM COATED ORAL DAILY
Status: DISCONTINUED | OUTPATIENT
Start: 2022-09-01 | End: 2022-09-03

## 2022-08-31 RX ORDER — FENOFIBRATE 134 MG/1
134 CAPSULE ORAL NIGHTLY
Status: DISCONTINUED | OUTPATIENT
Start: 2022-08-31 | End: 2022-09-03

## 2022-08-31 RX ORDER — BUPROPION HYDROCHLORIDE 150 MG/1
150 TABLET, EXTENDED RELEASE ORAL 2 TIMES DAILY
Status: DISCONTINUED | OUTPATIENT
Start: 2022-08-31 | End: 2022-09-03

## 2022-08-31 RX ORDER — HYDRALAZINE HYDROCHLORIDE 50 MG/1
100 TABLET, FILM COATED ORAL 3 TIMES DAILY
Status: DISCONTINUED | OUTPATIENT
Start: 2022-08-31 | End: 2022-09-03

## 2022-08-31 RX ORDER — TRAMADOL HYDROCHLORIDE 50 MG/1
50 TABLET ORAL EVERY 6 HOURS PRN
Status: DISCONTINUED | OUTPATIENT
Start: 2022-08-31 | End: 2022-09-03

## 2022-08-31 RX ORDER — HYDRALAZINE HYDROCHLORIDE 20 MG/ML
10 INJECTION INTRAMUSCULAR; INTRAVENOUS
Status: DISCONTINUED | OUTPATIENT
Start: 2022-08-31 | End: 2022-09-03

## 2022-08-31 RX ORDER — FUROSEMIDE 10 MG/ML
40 INJECTION INTRAMUSCULAR; INTRAVENOUS ONCE
Status: COMPLETED | OUTPATIENT
Start: 2022-08-31 | End: 2022-08-31

## 2022-08-31 NOTE — ED QUICK NOTES
Orders for admission, patient is aware of plan and ready to go upstairs. Any questions, please call ED RN bruce at extension [phone].      Patient Covid vaccination status: Fully vaccinated     COVID Test Ordered in ED: Rapid SARS-CoV-2 by PCR    COVID Suspicion at Admission: Low clinical suspicion for COVID    Running Infusions:  None    Mental Status/LOC at time of transport: alert    Other pertinent information:   CIWA score: N/A   NIH score:  N/A

## 2022-08-31 NOTE — ED INITIAL ASSESSMENT (HPI)
Pt has gained 7 lbs in last 3 days started having sob last night did a couple neb tx yesterday still sob. Had hernia surgery 8/17.  Hx of chf copd

## 2022-09-01 ENCOUNTER — APPOINTMENT (OUTPATIENT)
Dept: CV DIAGNOSTICS | Facility: HOSPITAL | Age: 42
End: 2022-09-01
Attending: INTERNAL MEDICINE

## 2022-09-01 ENCOUNTER — APPOINTMENT (OUTPATIENT)
Dept: CV DIAGNOSTICS | Facility: HOSPITAL | Age: 42
DRG: 291 | End: 2022-09-01
Attending: INTERNAL MEDICINE

## 2022-09-01 LAB
ANION GAP SERPL CALC-SCNC: 6 MMOL/L (ref 0–18)
ATRIAL RATE: 95 BPM
BASOPHILS # BLD AUTO: 0.12 X10(3) UL (ref 0–0.2)
BASOPHILS NFR BLD AUTO: 1.4 %
BUN BLD-MCNC: 36 MG/DL (ref 7–18)
CALCIUM BLD-MCNC: 9.3 MG/DL (ref 8.5–10.1)
CHLORIDE SERPL-SCNC: 111 MMOL/L (ref 98–112)
CO2 SERPL-SCNC: 22 MMOL/L (ref 21–32)
CREAT BLD-MCNC: 2.92 MG/DL
EOSINOPHIL # BLD AUTO: 0.55 X10(3) UL (ref 0–0.7)
EOSINOPHIL NFR BLD AUTO: 6.2 %
ERYTHROCYTE [DISTWIDTH] IN BLOOD BY AUTOMATED COUNT: 13.3 %
GFR SERPLBLD BASED ON 1.73 SQ M-ARVRAT: 26 ML/MIN/1.73M2 (ref 60–?)
GLUCOSE BLD-MCNC: 105 MG/DL (ref 70–99)
GLUCOSE BLD-MCNC: 106 MG/DL (ref 70–99)
GLUCOSE BLD-MCNC: 111 MG/DL (ref 70–99)
GLUCOSE BLD-MCNC: 112 MG/DL (ref 70–99)
GLUCOSE BLD-MCNC: 156 MG/DL (ref 70–99)
HCT VFR BLD AUTO: 45 %
HGB BLD-MCNC: 14.7 G/DL
IMM GRANULOCYTES # BLD AUTO: 0.04 X10(3) UL (ref 0–1)
IMM GRANULOCYTES NFR BLD: 0.5 %
LYMPHOCYTES # BLD AUTO: 3.28 X10(3) UL (ref 1–4)
LYMPHOCYTES NFR BLD AUTO: 36.9 %
MCH RBC QN AUTO: 29.5 PG (ref 26–34)
MCHC RBC AUTO-ENTMCNC: 32.7 G/DL (ref 31–37)
MCV RBC AUTO: 90.2 FL
MONOCYTES # BLD AUTO: 0.97 X10(3) UL (ref 0.1–1)
MONOCYTES NFR BLD AUTO: 10.9 %
NEUTROPHILS # BLD AUTO: 3.92 X10 (3) UL (ref 1.5–7.7)
NEUTROPHILS # BLD AUTO: 3.92 X10(3) UL (ref 1.5–7.7)
NEUTROPHILS NFR BLD AUTO: 44.1 %
OSMOLALITY SERPL CALC.SUM OF ELEC: 297 MOSM/KG (ref 275–295)
P AXIS: 34 DEGREES
P-R INTERVAL: 180 MS
PLATELET # BLD AUTO: 336 10(3)UL (ref 150–450)
POTASSIUM SERPL-SCNC: 3.3 MMOL/L (ref 3.5–5.1)
POTASSIUM SERPL-SCNC: 3.8 MMOL/L (ref 3.5–5.1)
Q-T INTERVAL: 398 MS
QRS DURATION: 88 MS
QTC CALCULATION (BEZET): 500 MS
R AXIS: 17 DEGREES
RBC # BLD AUTO: 4.99 X10(6)UL
SODIUM SERPL-SCNC: 139 MMOL/L (ref 136–145)
T AXIS: 106 DEGREES
VENTRICULAR RATE: 95 BPM
WBC # BLD AUTO: 8.9 X10(3) UL (ref 4–11)

## 2022-09-01 PROCEDURE — 84132 ASSAY OF SERUM POTASSIUM: CPT | Performed by: HOSPITALIST

## 2022-09-01 PROCEDURE — 94640 AIRWAY INHALATION TREATMENT: CPT

## 2022-09-01 PROCEDURE — 93306 TTE W/DOPPLER COMPLETE: CPT | Performed by: INTERNAL MEDICINE

## 2022-09-01 PROCEDURE — 85025 COMPLETE CBC W/AUTO DIFF WBC: CPT | Performed by: HOSPITALIST

## 2022-09-01 PROCEDURE — 94799 UNLISTED PULMONARY SVC/PX: CPT

## 2022-09-01 PROCEDURE — 82962 GLUCOSE BLOOD TEST: CPT

## 2022-09-01 PROCEDURE — 80048 BASIC METABOLIC PNL TOTAL CA: CPT | Performed by: HOSPITALIST

## 2022-09-01 RX ORDER — FUROSEMIDE 40 MG/1
40 TABLET ORAL DAILY
Status: DISCONTINUED | OUTPATIENT
Start: 2022-09-01 | End: 2022-09-02

## 2022-09-01 RX ORDER — CARVEDILOL 12.5 MG/1
12.5 TABLET ORAL 2 TIMES DAILY WITH MEALS
Status: DISCONTINUED | OUTPATIENT
Start: 2022-09-01 | End: 2022-09-03

## 2022-09-01 RX ORDER — POTASSIUM CHLORIDE 20 MEQ/1
20 TABLET, EXTENDED RELEASE ORAL ONCE
Status: COMPLETED | OUTPATIENT
Start: 2022-09-01 | End: 2022-09-01

## 2022-09-01 NOTE — PLAN OF CARE
Per patient leblanc is resolve this afternoon  Seen by dr. Cielo Dai -did med changes pls see mar  2 d echo today  Nephro consult-per dr. Alex Rivera he will notify dr. Danitza acosta  Problem: CARDIOVASCULAR - ADULT  Goal: Absence of cardiac arrhythmias or at baseline  Description: INTERVENTIONS:  - Continuous cardiac monitoring, monitor vital signs, obtain 12 lead EKG if indicated  - Evaluate effectiveness of antiarrhythmic and heart rate control medications as ordered  - Initiate emergency measures for life threatening arrhythmias  - Monitor electrolytes and administer replacement therapy as ordered  Outcome: Not Progressing     Problem: Diabetes/Glucose Control  Goal: Glucose maintained within prescribed range  Description: INTERVENTIONS:  - Monitor Blood Glucose as ordered  - Assess for signs and symptoms of hyperglycemia and hypoglycemia  - Administer ordered medications to maintain glucose within target range  - Assess barriers to adequate nutritional intake and initiate nutrition consult as needed  - Instruct patient on self management of diabetes  Outcome: Progressing     Problem: METABOLIC/FLUID AND ELECTROLYTES - ADULT  Goal: Glucose maintained within prescribed range  Description: INTERVENTIONS:  - Monitor Blood Glucose as ordered  - Assess for signs and symptoms of hyperglycemia and hypoglycemia  - Administer ordered medications to maintain glucose within target range  - Assess barriers to adequate nutritional intake and initiate nutrition consult as needed  - Instruct patient on self management of diabetes  Outcome: Progressing

## 2022-09-01 NOTE — CM/SW NOTE
Received call from Hortencia Burnette ([phone]) RN Case Coordinator from [facility] who is able to provide assistance with discharge planning if necessary. Discussed patient with RN during rounds and no anticipated discharge needs at this time. RN to update SW if needs change. SW will remain available.     JOSH Burrell  Discharge Planner  [phone]

## 2022-09-01 NOTE — PLAN OF CARE
Patient alert, interactive, and cooperative with care. On tele he is sinus rhythm. Has been afebrile. No sign sof distress. Continue w/ ordered meds. Maintain on strict I&O, weight monitoring, and fluid restriction.  Falls,  isolation, and safety precaution maintained  Problem: CARDIOVASCULAR - ADULT  Goal: Absence of cardiac arrhythmias or at baseline  Description: INTERVENTIONS:  - Continuous cardiac monitoring, monitor vital signs, obtain 12 lead EKG if indicated  - Evaluate effectiveness of antiarrhythmic and heart rate control medications as ordered  - Initiate emergency measures for life threatening arrhythmias  - Monitor electrolytes and administer replacement therapy as ordered  Outcome: Progressing     Problem: METABOLIC/FLUID AND ELECTROLYTES - ADULT  Goal: Glucose maintained within prescribed range  Description: INTERVENTIONS:  - Monitor Blood Glucose as ordered  - Assess for signs and symptoms of hyperglycemia and hypoglycemia  - Administer ordered medications to maintain glucose within target range  - Assess barriers to adequate nutritional intake and initiate nutrition consult as needed  - Instruct patient on self management of diabetes  Outcome: Progressing  Goal: Electrolytes maintained within normal limits  Description: INTERVENTIONS:  - Monitor labs and rhythm and assess patient for signs and symptoms of electrolyte imbalances  - Administer electrolyte replacement as ordered  - Monitor response to electrolyte replacements, including rhythm and repeat lab results as appropriate  - Fluid restriction as ordered  - Instruct patient on fluid and nutrition restrictions as appropriate  Outcome: Progressing     Problem: Diabetes/Glucose Control  Goal: Glucose maintained within prescribed range  Description: INTERVENTIONS:  - Monitor Blood Glucose as ordered  - Assess for signs and symptoms of hyperglycemia and hypoglycemia  - Administer ordered medications to maintain glucose within target range  - Assess barriers to adequate nutritional intake and initiate nutrition consult as needed  - Instruct patient on self management of diabetes  Outcome: Progressing     Problem: Patient/Family Goals  Goal: Patient/Family Long Term Goal  Description: Patient's Long Term Goal: \" Ill be able to go home\"    Interventions:  -compliance to ordered tests and meds to meet discharge criteria  - educate pt on ordered meds, tests ordered,MD follow up and importance of healthy lifestyle and exercise  - administer due meds  -cardiology consult  Outcome: Progressing  Goal: Patient/Family Short Term Goal  Description: Patient's Short Term Goal: \" I'll mo be short of breath anymore\"    Interventions:   - monitor for signs of distress  -supplemental O2  - Diuretics  Outcome: Progressing

## 2022-09-01 NOTE — PROGRESS NOTES
Assumed pt care at 1700, admission marycarmen completed, oriented to room. A&Ox4, glasses. 1L for comfort, denies pain/SOB, lung sounds diminished, tachypneic, AAV-[id number]-pzpwb aware, nitroglyc paste on chest. NSR on tele. Voids. C/o abd pain, hosp aware, gen surgery consulted, recent hernia repair. Up with SBA. POC monitor BP, gen cards/surgery to see, wean o2, POC updated with pt, questions answered, verbalized understanding. Will continue to monitor.

## 2022-09-02 LAB
ALBUMIN SERPL-MCNC: 2.8 G/DL (ref 3.4–5)
ALBUMIN SERPL-MCNC: 2.8 G/DL (ref 3.4–5)
ANION GAP SERPL CALC-SCNC: 10 MMOL/L (ref 0–18)
ANION GAP SERPL CALC-SCNC: 9 MMOL/L (ref 0–18)
BASOPHILS # BLD AUTO: 0.13 X10(3) UL (ref 0–0.2)
BASOPHILS NFR BLD AUTO: 1.6 %
BILIRUB UR QL STRIP.AUTO: NEGATIVE
BUN BLD-MCNC: 37 MG/DL (ref 7–18)
BUN BLD-MCNC: 40 MG/DL (ref 7–18)
CALCIUM BLD-MCNC: 8.9 MG/DL (ref 8.5–10.1)
CALCIUM BLD-MCNC: 9 MG/DL (ref 8.5–10.1)
CHLORIDE SERPL-SCNC: 109 MMOL/L (ref 98–112)
CHLORIDE SERPL-SCNC: 114 MMOL/L (ref 98–112)
CLARITY UR REFRACT.AUTO: CLEAR
CO2 SERPL-SCNC: 17 MMOL/L (ref 21–32)
CO2 SERPL-SCNC: 24 MMOL/L (ref 21–32)
COLOR UR AUTO: YELLOW
CREAT BLD-MCNC: 3.34 MG/DL
CREAT BLD-MCNC: 3.47 MG/DL
CREAT UR-SCNC: 178 MG/DL
EOSINOPHIL # BLD AUTO: 0.49 X10(3) UL (ref 0–0.7)
EOSINOPHIL NFR BLD AUTO: 5.9 %
ERYTHROCYTE [DISTWIDTH] IN BLOOD BY AUTOMATED COUNT: 13.2 %
GFR SERPLBLD BASED ON 1.73 SQ M-ARVRAT: 21 ML/MIN/1.73M2 (ref 60–?)
GFR SERPLBLD BASED ON 1.73 SQ M-ARVRAT: 22 ML/MIN/1.73M2 (ref 60–?)
GLUCOSE BLD-MCNC: 104 MG/DL (ref 70–99)
GLUCOSE BLD-MCNC: 107 MG/DL (ref 70–99)
GLUCOSE BLD-MCNC: 109 MG/DL (ref 70–99)
GLUCOSE BLD-MCNC: 109 MG/DL (ref 70–99)
GLUCOSE BLD-MCNC: 155 MG/DL (ref 70–99)
GLUCOSE BLD-MCNC: 95 MG/DL (ref 70–99)
GLUCOSE UR STRIP.AUTO-MCNC: NEGATIVE MG/DL
HCT VFR BLD AUTO: 44 %
HGB BLD-MCNC: 14.5 G/DL
IMM GRANULOCYTES # BLD AUTO: 0.02 X10(3) UL (ref 0–1)
IMM GRANULOCYTES NFR BLD: 0.2 %
KETONES UR STRIP.AUTO-MCNC: NEGATIVE MG/DL
LEUKOCYTE ESTERASE UR QL STRIP.AUTO: NEGATIVE
LYMPHOCYTES # BLD AUTO: 2.95 X10(3) UL (ref 1–4)
LYMPHOCYTES NFR BLD AUTO: 35.7 %
MAGNESIUM SERPL-MCNC: 2.3 MG/DL (ref 1.6–2.6)
MCH RBC QN AUTO: 29.6 PG (ref 26–34)
MCHC RBC AUTO-ENTMCNC: 33 G/DL (ref 31–37)
MCV RBC AUTO: 89.8 FL
MONOCYTES # BLD AUTO: 0.58 X10(3) UL (ref 0.1–1)
MONOCYTES NFR BLD AUTO: 7 %
NEUTROPHILS # BLD AUTO: 4.1 X10 (3) UL (ref 1.5–7.7)
NEUTROPHILS # BLD AUTO: 4.1 X10(3) UL (ref 1.5–7.7)
NEUTROPHILS NFR BLD AUTO: 49.6 %
NITRITE UR QL STRIP.AUTO: NEGATIVE
OSMOLALITY SERPL CALC.SUM OF ELEC: 301 MOSM/KG (ref 275–295)
OSMOLALITY SERPL CALC.SUM OF ELEC: 307 MOSM/KG (ref 275–295)
PH UR STRIP.AUTO: 6 [PH] (ref 5–8)
PHOSPHATE SERPL-MCNC: 3 MG/DL (ref 2.5–4.9)
PHOSPHATE SERPL-MCNC: 3.7 MG/DL (ref 2.5–4.9)
PLATELET # BLD AUTO: 351 10(3)UL (ref 150–450)
POTASSIUM SERPL-SCNC: 3.7 MMOL/L (ref 3.5–5.1)
POTASSIUM SERPL-SCNC: 4 MMOL/L (ref 3.5–5.1)
POTASSIUM SERPL-SCNC: 4 MMOL/L (ref 3.5–5.1)
PROT UR STRIP.AUTO-MCNC: 100 MG/DL
RBC # BLD AUTO: 4.9 X10(6)UL
RBC UR QL AUTO: NEGATIVE
SODIUM SERPL-SCNC: 141 MMOL/L (ref 136–145)
SODIUM SERPL-SCNC: 142 MMOL/L (ref 136–145)
SODIUM SERPL-SCNC: 53 MMOL/L
SP GR UR STRIP.AUTO: 1.02 (ref 1–1.03)
UROBILINOGEN UR STRIP.AUTO-MCNC: <2 MG/DL
UUN UR-MCNC: 959 MG/DL
WBC # BLD AUTO: 8.3 X10(3) UL (ref 4–11)

## 2022-09-02 PROCEDURE — 82962 GLUCOSE BLOOD TEST: CPT

## 2022-09-02 PROCEDURE — 84560 ASSAY OF URINE/URIC ACID: CPT | Performed by: INTERNAL MEDICINE

## 2022-09-02 PROCEDURE — 84132 ASSAY OF SERUM POTASSIUM: CPT | Performed by: HOSPITALIST

## 2022-09-02 PROCEDURE — 80069 RENAL FUNCTION PANEL: CPT | Performed by: INTERNAL MEDICINE

## 2022-09-02 PROCEDURE — 82570 ASSAY OF URINE CREATININE: CPT | Performed by: INTERNAL MEDICINE

## 2022-09-02 PROCEDURE — 81001 URINALYSIS AUTO W/SCOPE: CPT | Performed by: INTERNAL MEDICINE

## 2022-09-02 PROCEDURE — 84300 ASSAY OF URINE SODIUM: CPT | Performed by: INTERNAL MEDICINE

## 2022-09-02 PROCEDURE — 84540 ASSAY OF URINE/UREA-N: CPT | Performed by: INTERNAL MEDICINE

## 2022-09-02 PROCEDURE — 83735 ASSAY OF MAGNESIUM: CPT | Performed by: INTERNAL MEDICINE

## 2022-09-02 PROCEDURE — 85025 COMPLETE CBC W/AUTO DIFF WBC: CPT | Performed by: HOSPITALIST

## 2022-09-02 RX ORDER — CARVEDILOL 12.5 MG/1
12.5 TABLET ORAL 2 TIMES DAILY WITH MEALS
Qty: 180 TABLET | Refills: 0 | Status: SHIPPED | OUTPATIENT
Start: 2022-09-02 | End: 2022-12-01

## 2022-09-02 NOTE — PLAN OF CARE
Patient alert and oriented. Denies pain. Room air with oxygen saturation 96%. Independent with ADL's. Strict intake and output. Daily weight. Kept clean and dry.     Problem: Diabetes/Glucose Control  Goal: Glucose maintained within prescribed range  Description: INTERVENTIONS:  - Monitor Blood Glucose as ordered  - Assess for signs and symptoms of hyperglycemia and hypoglycemia  - Administer ordered medications to maintain glucose within target range  - Assess barriers to adequate nutritional intake and initiate nutrition consult as needed  - Instruct patient on self management of diabetes  Outcome: Progressing     Problem: CARDIOVASCULAR - ADULT  Goal: Absence of cardiac arrhythmias or at baseline  Description: INTERVENTIONS:  - Continuous cardiac monitoring, monitor vital signs, obtain 12 lead EKG if indicated  - Evaluate effectiveness of antiarrhythmic and heart rate control medications as ordered  - Initiate emergency measures for life threatening arrhythmias  - Monitor electrolytes and administer replacement therapy as ordered  Outcome: Progressing     Problem: METABOLIC/FLUID AND ELECTROLYTES - ADULT  Goal: Glucose maintained within prescribed range  Description: INTERVENTIONS:  - Monitor Blood Glucose as ordered  - Assess for signs and symptoms of hyperglycemia and hypoglycemia  - Administer ordered medications to maintain glucose within target range  - Assess barriers to adequate nutritional intake and initiate nutrition consult as needed  - Instruct patient on self management of diabetes  Outcome: Progressing  Goal: Electrolytes maintained within normal limits  Description: INTERVENTIONS:  - Monitor labs and rhythm and assess patient for signs and symptoms of electrolyte imbalances  - Administer electrolyte replacement as ordered  - Monitor response to electrolyte replacements, including rhythm and repeat lab results as appropriate  - Fluid restriction as ordered  - Instruct patient on fluid and nutrition restrictions as appropriate  Outcome: Progressing

## 2022-09-02 NOTE — PLAN OF CARE
Assumed care of pt at 0730  A&Ox4, up ad osvaldo with a steady gait, no complaints of pain  R/A, NSR, no chest pain, pt endorses dyspnea on exertion  Skin is clean, dry, and intact, no wounds present  Continent of bowel and bladder, last BM 8/30, endorses some abdominal tenderness related to recent surgery  Fall precautions in place, call light within reach, pt updated on plan of care, will continue to monitor                    Problem: Diabetes/Glucose Control  Goal: Glucose maintained within prescribed range  Description: INTERVENTIONS:  - Monitor Blood Glucose as ordered  - Assess for signs and symptoms of hyperglycemia and hypoglycemia  - Administer ordered medications to maintain glucose within target range  - Assess barriers to adequate nutritional intake and initiate nutrition consult as needed  - Instruct patient on self management of diabetes  Outcome: Progressing     Problem: Patient/Family Goals  Goal: Patient/Family Long Term Goal  Description: Patient's Long Term Goal: \" Ill be able to go home\"    Interventions:  -compliance to ordered tests and meds to meet discharge criteria  - educate pt on ordered meds, tests ordered,MD follow up and importance of healthy lifestyle and exercise  - administer due meds  -cardiology consult  Outcome: Progressing  Goal: Patient/Family Short Term Goal  Description: Patient's Short Term Goal: \" I'll mo be short of breath anymore\"    Interventions:   - monitor for signs of distress  -supplemental O2  - Diuretics  Outcome: Progressing     Problem: CARDIOVASCULAR - ADULT  Goal: Absence of cardiac arrhythmias or at baseline  Description: INTERVENTIONS:  - Continuous cardiac monitoring, monitor vital signs, obtain 12 lead EKG if indicated  - Evaluate effectiveness of antiarrhythmic and heart rate control medications as ordered  - Initiate emergency measures for life threatening arrhythmias  - Monitor electrolytes and administer replacement therapy as ordered  Outcome: Progressing     Problem: METABOLIC/FLUID AND ELECTROLYTES - ADULT  Goal: Glucose maintained within prescribed range  Description: INTERVENTIONS:  - Monitor Blood Glucose as ordered  - Assess for signs and symptoms of hyperglycemia and hypoglycemia  - Administer ordered medications to maintain glucose within target range  - Assess barriers to adequate nutritional intake and initiate nutrition consult as needed  - Instruct patient on self management of diabetes  Outcome: Progressing  Goal: Electrolytes maintained within normal limits  Description: INTERVENTIONS:  - Monitor labs and rhythm and assess patient for signs and symptoms of electrolyte imbalances  - Administer electrolyte replacement as ordered  - Monitor response to electrolyte replacements, including rhythm and repeat lab results as appropriate  - Fluid restriction as ordered  - Instruct patient on fluid and nutrition restrictions as appropriate  Outcome: Progressing

## 2022-09-03 VITALS
RESPIRATION RATE: 17 BRPM | TEMPERATURE: 99 F | HEIGHT: 74 IN | OXYGEN SATURATION: 100 % | SYSTOLIC BLOOD PRESSURE: 140 MMHG | HEART RATE: 86 BPM | BODY MASS INDEX: 32.96 KG/M2 | WEIGHT: 256.81 LBS | DIASTOLIC BLOOD PRESSURE: 81 MMHG

## 2022-09-03 LAB
ALBUMIN SERPL-MCNC: 3 G/DL (ref 3.4–5)
ANION GAP SERPL CALC-SCNC: 8 MMOL/L (ref 0–18)
BASOPHILS # BLD AUTO: 0.14 X10(3) UL (ref 0–0.2)
BASOPHILS NFR BLD AUTO: 1.9 %
BUN BLD-MCNC: 42 MG/DL (ref 7–18)
CALCIUM BLD-MCNC: 9.1 MG/DL (ref 8.5–10.1)
CHLORIDE SERPL-SCNC: 112 MMOL/L (ref 98–112)
CO2 SERPL-SCNC: 21 MMOL/L (ref 21–32)
CREAT BLD-MCNC: 3.3 MG/DL
CREAT UR-SCNC: 127 MG/DL
EOSINOPHIL # BLD AUTO: 0.42 X10(3) UL (ref 0–0.7)
EOSINOPHIL NFR BLD AUTO: 5.7 %
ERYTHROCYTE [DISTWIDTH] IN BLOOD BY AUTOMATED COUNT: 13.2 %
GFR SERPLBLD BASED ON 1.73 SQ M-ARVRAT: 23 ML/MIN/1.73M2 (ref 60–?)
GLUCOSE BLD-MCNC: 113 MG/DL (ref 70–99)
GLUCOSE BLD-MCNC: 139 MG/DL (ref 70–99)
GLUCOSE BLD-MCNC: 91 MG/DL (ref 70–99)
HCT VFR BLD AUTO: 43.9 %
HGB BLD-MCNC: 14.3 G/DL
IMM GRANULOCYTES # BLD AUTO: 0.02 X10(3) UL (ref 0–1)
IMM GRANULOCYTES NFR BLD: 0.3 %
LYMPHOCYTES # BLD AUTO: 2.23 X10(3) UL (ref 1–4)
LYMPHOCYTES NFR BLD AUTO: 30.1 %
MAGNESIUM SERPL-MCNC: 2.5 MG/DL (ref 1.6–2.6)
MCH RBC QN AUTO: 29.5 PG (ref 26–34)
MCHC RBC AUTO-ENTMCNC: 32.6 G/DL (ref 31–37)
MCV RBC AUTO: 90.5 FL
MONOCYTES # BLD AUTO: 0.6 X10(3) UL (ref 0.1–1)
MONOCYTES NFR BLD AUTO: 8.1 %
NEUTROPHILS # BLD AUTO: 3.99 X10 (3) UL (ref 1.5–7.7)
NEUTROPHILS # BLD AUTO: 3.99 X10(3) UL (ref 1.5–7.7)
NEUTROPHILS NFR BLD AUTO: 53.9 %
OSMOLALITY SERPL CALC.SUM OF ELEC: 303 MOSM/KG (ref 275–295)
PHOSPHATE SERPL-MCNC: 3.1 MG/DL (ref 2.5–4.9)
PLATELET # BLD AUTO: 346 10(3)UL (ref 150–450)
POTASSIUM SERPL-SCNC: 3.7 MMOL/L (ref 3.5–5.1)
RBC # BLD AUTO: 4.85 X10(6)UL
SODIUM SERPL-SCNC: 141 MMOL/L (ref 136–145)
SODIUM SERPL-SCNC: 72 MMOL/L
URATE UR-MCNC: 46 MG/DL
WBC # BLD AUTO: 7.4 X10(3) UL (ref 4–11)

## 2022-09-03 PROCEDURE — 84300 ASSAY OF URINE SODIUM: CPT | Performed by: INTERNAL MEDICINE

## 2022-09-03 PROCEDURE — 83735 ASSAY OF MAGNESIUM: CPT | Performed by: INTERNAL MEDICINE

## 2022-09-03 PROCEDURE — 80069 RENAL FUNCTION PANEL: CPT | Performed by: INTERNAL MEDICINE

## 2022-09-03 PROCEDURE — 82570 ASSAY OF URINE CREATININE: CPT | Performed by: INTERNAL MEDICINE

## 2022-09-03 PROCEDURE — 84560 ASSAY OF URINE/URIC ACID: CPT | Performed by: INTERNAL MEDICINE

## 2022-09-03 PROCEDURE — 82962 GLUCOSE BLOOD TEST: CPT

## 2022-09-03 PROCEDURE — 85025 COMPLETE CBC W/AUTO DIFF WBC: CPT | Performed by: HOSPITALIST

## 2022-09-03 NOTE — PLAN OF CARE
Assumed care of pt at 0730  A&Ox4, up ad osvaldo with a steady gait, no complaints of pain  R/A, NSR, no chest pain, no shortness of breath  Skin is clean, dry and intact, no wounds present  Continent of bowel and bladder, last BM 9/2  Fall precautions in place, call light within reach, pt updated on plan of care, will continue to monitor    Discharge and medication education completed with pt  All questions answered regarding discharge, medications, and follow up appointments  Pt transported to [facility] by staff for discharge home                        Problem: Diabetes/Glucose Control  Goal: Glucose maintained within prescribed range  Description: INTERVENTIONS:  - Monitor Blood Glucose as ordered  - Assess for signs and symptoms of hyperglycemia and hypoglycemia  - Administer ordered medications to maintain glucose within target range  - Assess barriers to adequate nutritional intake and initiate nutrition consult as needed  - Instruct patient on self management of diabetes  Outcome: Progressing     Problem: Patient/Family Goals  Goal: Patient/Family Long Term Goal  Description: Patient's Long Term Goal: \" Ill be able to go home\"    Interventions:  -compliance to ordered tests and meds to meet discharge criteria  - educate pt on ordered meds, tests ordered,MD follow up and importance of healthy lifestyle and exercise  - administer due meds  -cardiology consult  Outcome: Progressing  Goal: Patient/Family Short Term Goal  Description: Patient's Short Term Goal: \" I'll mo be short of breath anymore\"    Interventions:   - monitor for signs of distress  -supplemental O2  - Diuretics  Outcome: Progressing     Problem: CARDIOVASCULAR - ADULT  Goal: Absence of cardiac arrhythmias or at baseline  Description: INTERVENTIONS:  - Continuous cardiac monitoring, monitor vital signs, obtain 12 lead EKG if indicated  - Evaluate effectiveness of antiarrhythmic and heart rate control medications as ordered  - Initiate emergency measures for life threatening arrhythmias  - Monitor electrolytes and administer replacement therapy as ordered  Outcome: Progressing     Problem: METABOLIC/FLUID AND ELECTROLYTES - ADULT  Goal: Glucose maintained within prescribed range  Description: INTERVENTIONS:  - Monitor Blood Glucose as ordered  - Assess for signs and symptoms of hyperglycemia and hypoglycemia  - Administer ordered medications to maintain glucose within target range  - Assess barriers to adequate nutritional intake and initiate nutrition consult as needed  - Instruct patient on self management of diabetes  Outcome: Progressing  Goal: Electrolytes maintained within normal limits  Description: INTERVENTIONS:  - Monitor labs and rhythm and assess patient for signs and symptoms of electrolyte imbalances  - Administer electrolyte replacement as ordered  - Monitor response to electrolyte replacements, including rhythm and repeat lab results as appropriate  - Fluid restriction as ordered  - Instruct patient on fluid and nutrition restrictions as appropriate  Outcome: Progressing

## 2022-09-03 NOTE — PLAN OF CARE
Negative orthostatic vitals. Alert and oriented. Pain to surgical site. Ultram given with relieve. No shortness of breath noted.  Kept clean and dry    Problem: Diabetes/Glucose Control  Goal: Glucose maintained within prescribed range  Description: INTERVENTIONS:  - Monitor Blood Glucose as ordered  - Assess for signs and symptoms of hyperglycemia and hypoglycemia  - Administer ordered medications to maintain glucose within target range  - Assess barriers to adequate nutritional intake and initiate nutrition consult as needed  - Instruct patient on self management of diabetes  Outcome: Progressing     Problem: Patient/Family Goals  Goal: Patient/Family Short Term Goal  Description: Patient's Short Term Goal: \" I'll mo be short of breath anymore\"    Interventions:   - monitor for signs of distress  -supplemental O2  - Diuretics  Outcome: Progressing     Problem: CARDIOVASCULAR - ADULT  Goal: Absence of cardiac arrhythmias or at baseline  Description: INTERVENTIONS:  - Continuous cardiac monitoring, monitor vital signs, obtain 12 lead EKG if indicated  - Evaluate effectiveness of antiarrhythmic and heart rate control medications as ordered  - Initiate emergency measures for life threatening arrhythmias  - Monitor electrolytes and administer replacement therapy as ordered  Outcome: Progressing     Problem: METABOLIC/FLUID AND ELECTROLYTES - ADULT  Goal: Glucose maintained within prescribed range  Description: INTERVENTIONS:  - Monitor Blood Glucose as ordered  - Assess for signs and symptoms of hyperglycemia and hypoglycemia  - Administer ordered medications to maintain glucose within target range  - Assess barriers to adequate nutritional intake and initiate nutrition consult as needed  - Instruct patient on self management of diabetes  Outcome: Progressing  Goal: Electrolytes maintained within normal limits  Description: INTERVENTIONS:  - Monitor labs and rhythm and assess patient for signs and symptoms of electrolyte imbalances  - Administer electrolyte replacement as ordered  - Monitor response to electrolyte replacements, including rhythm and repeat lab results as appropriate  - Fluid restriction as ordered  - Instruct patient on fluid and nutrition restrictions as appropriate  Outcome: Progressing

## 2022-09-04 NOTE — PAYOR COMM NOTE
Discharge Notification    Patient Name: Marly Araya  Payor: Suman Medellin #: [id number]  Authorization Number: [id number]  Admit Date/Time: 8/31/2022 12:14 PM  Discharge Date/Time: 9/3/2022 1:58 PM

## 2022-09-09 ENCOUNTER — HOSPITAL ENCOUNTER (EMERGENCY)
Age: 42
Discharge: LEFT BEFORE DISCHARGE | End: 2022-09-09
Payer: MEDICAID

## 2022-09-09 VITALS
OXYGEN SATURATION: 97 % | WEIGHT: 250 LBS | TEMPERATURE: 97 F | HEIGHT: 74 IN | HEART RATE: 109 BPM | BODY MASS INDEX: 32.08 KG/M2 | DIASTOLIC BLOOD PRESSURE: 88 MMHG | RESPIRATION RATE: 20 BRPM | SYSTOLIC BLOOD PRESSURE: 155 MMHG

## 2022-09-09 NOTE — ED INITIAL ASSESSMENT (HPI)
Pt needs refill on fluoxetine, lamictal, and vyvanse. Pt dr out of office for the next month.  Pt has been out of meds for last week

## 2022-09-22 NOTE — PROGRESS NOTES
06/10/19 1354 06/10/19 1356 06/10/19 1358   Vital Signs   /83 136/88 125/53  (patient complaining of lightheadedness upon standing)   BP Location Right arm Right arm Right arm   BP Method Automatic Automatic Automatic   Patient Position Lying Sitt Is This A New Presentation, Or A Follow-Up?: Growth How Severe Is Your Skin Lesion?: moderate Has Your Skin Lesion Been Treated?: not been treated

## 2022-10-08 ENCOUNTER — HOSPITAL ENCOUNTER (EMERGENCY)
Age: 42
Discharge: HOME | End: 2022-10-08
Payer: MEDICAID

## 2022-10-08 VITALS
DIASTOLIC BLOOD PRESSURE: 72 MMHG | RESPIRATION RATE: 16 BRPM | OXYGEN SATURATION: 97 % | BODY MASS INDEX: 34.52 KG/M2 | HEART RATE: 84 BPM | WEIGHT: 269 LBS | HEIGHT: 74 IN | SYSTOLIC BLOOD PRESSURE: 115 MMHG | TEMPERATURE: 97 F

## 2022-10-08 DIAGNOSIS — Z76.0 ENCOUNTER FOR MEDICATION REFILL: Primary | ICD-10-CM

## 2022-10-08 PROCEDURE — 99283 EMERGENCY DEPT VISIT LOW MDM: CPT

## 2022-10-08 RX ORDER — ARIPIPRAZOLE 5 MG/1
5 TABLET ORAL DAILY
Qty: 30 TABLET | Refills: 0 | Status: SHIPPED | OUTPATIENT
Start: 2022-10-08 | End: 2022-11-07

## 2022-10-08 RX ORDER — DEXTROAMPHETAMINE SACCHARATE, AMPHETAMINE ASPARTATE, DEXTROAMPHETAMINE SULFATE AND AMPHETAMINE SULFATE 5; 5; 5; 5 MG/1; MG/1; MG/1; MG/1
TABLET ORAL DAILY
COMMUNITY

## 2022-10-08 RX ORDER — FLUOXETINE HYDROCHLORIDE 40 MG/1
40 CAPSULE ORAL DAILY
Qty: 30 CAPSULE | Refills: 0 | Status: SHIPPED | OUTPATIENT
Start: 2022-10-08 | End: 2022-11-07

## 2022-10-08 RX ORDER — LAMOTRIGINE 150 MG/1
150 TABLET ORAL DAILY
Qty: 30 TABLET | Refills: 0 | Status: SHIPPED | OUTPATIENT
Start: 2022-10-08 | End: 2022-11-07

## 2022-11-03 ENCOUNTER — HOSPITAL ENCOUNTER (INPATIENT)
Facility: HOSPITAL | Age: 42
LOS: 3 days | Discharge: HOME | End: 2022-11-06
Attending: EMERGENCY MEDICINE | Admitting: HOSPITALIST
Payer: MEDICAID

## 2022-11-03 ENCOUNTER — APPOINTMENT (OUTPATIENT)
Dept: GENERAL RADIOLOGY | Age: 42
End: 2022-11-03
Attending: EMERGENCY MEDICINE
Payer: MEDICAID

## 2022-11-03 DIAGNOSIS — N18.4 STAGE 4 CHRONIC KIDNEY DISEASE (HCC): ICD-10-CM

## 2022-11-03 DIAGNOSIS — I50.33 ACUTE ON CHRONIC DIASTOLIC CONGESTIVE HEART FAILURE (HCC): ICD-10-CM

## 2022-11-03 DIAGNOSIS — R07.9 CHEST PAIN, UNSPECIFIED TYPE: Primary | ICD-10-CM

## 2022-11-03 DIAGNOSIS — R77.8 ELEVATED TROPONIN: ICD-10-CM

## 2022-11-03 LAB
ALBUMIN SERPL-MCNC: 3 G/DL (ref 3.4–5)
ALBUMIN/GLOB SERPL: 0.9 {RATIO} (ref 1–2)
ALP LIVER SERPL-CCNC: 110 U/L
ALT SERPL-CCNC: 36 U/L
ANION GAP SERPL CALC-SCNC: 9 MMOL/L (ref 0–18)
APTT PPP: 27.5 SECONDS (ref 23.3–35.6)
AST SERPL-CCNC: 22 U/L (ref 15–37)
ATRIAL RATE: 90 BPM
BASOPHILS # BLD AUTO: 0.12 X10(3) UL (ref 0–0.2)
BASOPHILS NFR BLD AUTO: 1.2 %
BILIRUB SERPL-MCNC: 0.3 MG/DL (ref 0.1–2)
BUN BLD-MCNC: 36 MG/DL (ref 7–18)
CALCIUM BLD-MCNC: 8.5 MG/DL (ref 8.5–10.1)
CHLORIDE SERPL-SCNC: 109 MMOL/L (ref 98–112)
CHOLEST SERPL-MCNC: 191 MG/DL (ref ?–200)
CO2 SERPL-SCNC: 20 MMOL/L (ref 21–32)
CREAT BLD-MCNC: 3.21 MG/DL
EOSINOPHIL # BLD AUTO: 0.25 X10(3) UL (ref 0–0.7)
EOSINOPHIL NFR BLD AUTO: 2.5 %
ERYTHROCYTE [DISTWIDTH] IN BLOOD BY AUTOMATED COUNT: 13.2 %
GFR SERPLBLD BASED ON 1.73 SQ M-ARVRAT: 23 ML/MIN/1.73M2 (ref 60–?)
GLOBULIN PLAS-MCNC: 3.4 G/DL (ref 2.8–4.4)
GLUCOSE BLD-MCNC: 153 MG/DL (ref 70–99)
GLUCOSE BLD-MCNC: 218 MG/DL (ref 70–99)
GLUCOSE BLD-MCNC: 231 MG/DL (ref 70–99)
HCT VFR BLD AUTO: 43 %
HDLC SERPL-MCNC: 28 MG/DL (ref 40–59)
HGB BLD-MCNC: 14.5 G/DL
IMM GRANULOCYTES # BLD AUTO: 0.04 X10(3) UL (ref 0–1)
IMM GRANULOCYTES NFR BLD: 0.4 %
INR BLD: 0.89 (ref 0.85–1.16)
LDLC SERPL CALC-MCNC: 76 MG/DL (ref ?–100)
LYMPHOCYTES # BLD AUTO: 2.76 X10(3) UL (ref 1–4)
LYMPHOCYTES NFR BLD AUTO: 27.2 %
MCH RBC QN AUTO: 29.8 PG (ref 26–34)
MCHC RBC AUTO-ENTMCNC: 33.7 G/DL (ref 31–37)
MCV RBC AUTO: 88.3 FL
MONOCYTES # BLD AUTO: 0.77 X10(3) UL (ref 0.1–1)
MONOCYTES NFR BLD AUTO: 7.6 %
NEUTROPHILS # BLD AUTO: 6.22 X10 (3) UL (ref 1.5–7.7)
NEUTROPHILS # BLD AUTO: 6.22 X10(3) UL (ref 1.5–7.7)
NEUTROPHILS NFR BLD AUTO: 61.1 %
NONHDLC SERPL-MCNC: 163 MG/DL (ref ?–130)
NT-PROBNP SERPL-MCNC: 5367 PG/ML (ref ?–125)
OSMOLALITY SERPL CALC.SUM OF ELEC: 302 MOSM/KG (ref 275–295)
P AXIS: 31 DEGREES
P-R INTERVAL: 202 MS
PLATELET # BLD AUTO: 222 10(3)UL (ref 150–450)
POTASSIUM SERPL-SCNC: 3.3 MMOL/L (ref 3.5–5.1)
PROT SERPL-MCNC: 6.4 G/DL (ref 6.4–8.2)
PROTHROMBIN TIME: 12.1 SECONDS (ref 11.6–14.8)
Q-T INTERVAL: 384 MS
QRS DURATION: 88 MS
QTC CALCULATION (BEZET): 469 MS
R AXIS: 17 DEGREES
RBC # BLD AUTO: 4.87 X10(6)UL
SARS-COV-2 RNA RESP QL NAA+PROBE: NOT DETECTED
SODIUM SERPL-SCNC: 138 MMOL/L (ref 136–145)
T AXIS: 144 DEGREES
TRIGL SERPL-MCNC: 549 MG/DL (ref 30–149)
TROPONIN I HIGH SENSITIVITY: 341 NG/L
VENTRICULAR RATE: 90 BPM
VLDLC SERPL CALC-MCNC: 87 MG/DL (ref 0–30)
WBC # BLD AUTO: 10.2 X10(3) UL (ref 4–11)

## 2022-11-03 PROCEDURE — 80061 LIPID PANEL: CPT | Performed by: EMERGENCY MEDICINE

## 2022-11-03 PROCEDURE — 85610 PROTHROMBIN TIME: CPT | Performed by: EMERGENCY MEDICINE

## 2022-11-03 PROCEDURE — 71045 X-RAY EXAM CHEST 1 VIEW: CPT | Performed by: EMERGENCY MEDICINE

## 2022-11-03 PROCEDURE — 96374 THER/PROPH/DIAG INJ IV PUSH: CPT

## 2022-11-03 PROCEDURE — 83880 ASSAY OF NATRIURETIC PEPTIDE: CPT | Performed by: EMERGENCY MEDICINE

## 2022-11-03 PROCEDURE — 80053 COMPREHEN METABOLIC PANEL: CPT | Performed by: EMERGENCY MEDICINE

## 2022-11-03 PROCEDURE — 85025 COMPLETE CBC W/AUTO DIFF WBC: CPT | Performed by: EMERGENCY MEDICINE

## 2022-11-03 PROCEDURE — 82962 GLUCOSE BLOOD TEST: CPT

## 2022-11-03 PROCEDURE — 93010 ELECTROCARDIOGRAM REPORT: CPT

## 2022-11-03 PROCEDURE — 93005 ELECTROCARDIOGRAM TRACING: CPT

## 2022-11-03 PROCEDURE — 99285 EMERGENCY DEPT VISIT HI MDM: CPT

## 2022-11-03 PROCEDURE — 84484 ASSAY OF TROPONIN QUANT: CPT | Performed by: EMERGENCY MEDICINE

## 2022-11-03 PROCEDURE — 85730 THROMBOPLASTIN TIME PARTIAL: CPT | Performed by: EMERGENCY MEDICINE

## 2022-11-03 RX ORDER — FUROSEMIDE 10 MG/ML
40 INJECTION INTRAMUSCULAR; INTRAVENOUS DAILY
Status: DISCONTINUED | OUTPATIENT
Start: 2022-11-04 | End: 2022-11-04

## 2022-11-03 RX ORDER — NIFEDIPINE 30 MG/1
90 TABLET, EXTENDED RELEASE ORAL DAILY
Status: DISCONTINUED | OUTPATIENT
Start: 2022-11-04 | End: 2022-11-06

## 2022-11-03 RX ORDER — SENNOSIDES 8.6 MG
17.2 TABLET ORAL NIGHTLY PRN
Status: DISCONTINUED | OUTPATIENT
Start: 2022-11-03 | End: 2022-11-06

## 2022-11-03 RX ORDER — ARIPIPRAZOLE 5 MG/1
15 TABLET ORAL DAILY
Status: DISCONTINUED | OUTPATIENT
Start: 2022-11-04 | End: 2022-11-06

## 2022-11-03 RX ORDER — ONDANSETRON 2 MG/ML
4 INJECTION INTRAMUSCULAR; INTRAVENOUS EVERY 6 HOURS PRN
Status: DISCONTINUED | OUTPATIENT
Start: 2022-11-03 | End: 2022-11-06

## 2022-11-03 RX ORDER — POTASSIUM CHLORIDE 20 MEQ/1
20 TABLET, EXTENDED RELEASE ORAL ONCE
Status: COMPLETED | OUTPATIENT
Start: 2022-11-03 | End: 2022-11-03

## 2022-11-03 RX ORDER — HEPARIN SODIUM 5000 [USP'U]/ML
5000 INJECTION, SOLUTION INTRAVENOUS; SUBCUTANEOUS EVERY 8 HOURS SCHEDULED
Status: DISCONTINUED | OUTPATIENT
Start: 2022-11-03 | End: 2022-11-06

## 2022-11-03 RX ORDER — ALBUTEROL SULFATE 90 UG/1
2 AEROSOL, METERED RESPIRATORY (INHALATION) EVERY 4 HOURS PRN
Status: DISCONTINUED | OUTPATIENT
Start: 2022-11-03 | End: 2022-11-06

## 2022-11-03 RX ORDER — LAMOTRIGINE 150 MG/1
150 TABLET ORAL DAILY
Status: DISCONTINUED | OUTPATIENT
Start: 2022-11-04 | End: 2022-11-06

## 2022-11-03 RX ORDER — HYDRALAZINE HYDROCHLORIDE 50 MG/1
100 TABLET, FILM COATED ORAL 3 TIMES DAILY
Status: DISCONTINUED | OUTPATIENT
Start: 2022-11-03 | End: 2022-11-06

## 2022-11-03 RX ORDER — BUPROPION HYDROCHLORIDE 150 MG/1
150 TABLET, EXTENDED RELEASE ORAL 2 TIMES DAILY
Status: DISCONTINUED | OUTPATIENT
Start: 2022-11-03 | End: 2022-11-06

## 2022-11-03 RX ORDER — LAMOTRIGINE 100 MG/1
100 TABLET ORAL DAILY
Status: DISCONTINUED | OUTPATIENT
Start: 2022-11-03 | End: 2022-11-03

## 2022-11-03 RX ORDER — BISACODYL 10 MG
10 SUPPOSITORY, RECTAL RECTAL
Status: DISCONTINUED | OUTPATIENT
Start: 2022-11-03 | End: 2022-11-06

## 2022-11-03 RX ORDER — ACETAMINOPHEN 500 MG
500 TABLET ORAL EVERY 6 HOURS PRN
Status: DISCONTINUED | OUTPATIENT
Start: 2022-11-03 | End: 2022-11-03

## 2022-11-03 RX ORDER — ARIPIPRAZOLE 10 MG/1
10 TABLET ORAL DAILY
Status: DISCONTINUED | OUTPATIENT
Start: 2022-11-03 | End: 2022-11-03

## 2022-11-03 RX ORDER — DEXTROAMPHETAMINE SACCHARATE, AMPHETAMINE ASPARTATE, DEXTROAMPHETAMINE SULFATE AND AMPHETAMINE SULFATE 2.5; 2.5; 2.5; 2.5 MG/1; MG/1; MG/1; MG/1
30 TABLET ORAL
Status: DISCONTINUED | OUTPATIENT
Start: 2022-11-04 | End: 2022-11-06

## 2022-11-03 RX ORDER — CLONIDINE HYDROCHLORIDE 0.3 MG/1
0.3 TABLET ORAL 3 TIMES DAILY
COMMUNITY

## 2022-11-03 RX ORDER — ISOSORBIDE MONONITRATE 30 MG/1
30 TABLET, EXTENDED RELEASE ORAL DAILY
Status: DISCONTINUED | OUTPATIENT
Start: 2022-11-04 | End: 2022-11-06

## 2022-11-03 RX ORDER — FUROSEMIDE 10 MG/ML
40 INJECTION INTRAMUSCULAR; INTRAVENOUS ONCE
Status: COMPLETED | OUTPATIENT
Start: 2022-11-03 | End: 2022-11-03

## 2022-11-03 RX ORDER — POLYETHYLENE GLYCOL 3350 17 G/17G
17 POWDER, FOR SOLUTION ORAL DAILY PRN
Status: DISCONTINUED | OUTPATIENT
Start: 2022-11-03 | End: 2022-11-06

## 2022-11-03 RX ORDER — FLUOXETINE HYDROCHLORIDE 20 MG/1
40 CAPSULE ORAL DAILY
Status: DISCONTINUED | OUTPATIENT
Start: 2022-11-04 | End: 2022-11-06

## 2022-11-03 RX ORDER — FENOFIBRATE 134 MG/1
134 CAPSULE ORAL NIGHTLY
Status: DISCONTINUED | OUTPATIENT
Start: 2022-11-03 | End: 2022-11-06

## 2022-11-03 RX ORDER — CARVEDILOL 12.5 MG/1
12.5 TABLET ORAL 2 TIMES DAILY WITH MEALS
Status: DISCONTINUED | OUTPATIENT
Start: 2022-11-03 | End: 2022-11-04

## 2022-11-03 RX ORDER — ACETAMINOPHEN 500 MG
500 TABLET ORAL EVERY 4 HOURS PRN
Status: DISCONTINUED | OUTPATIENT
Start: 2022-11-03 | End: 2022-11-06

## 2022-11-03 RX ORDER — HEPARIN SODIUM 5000 [USP'U]/ML
5000 INJECTION, SOLUTION INTRAVENOUS; SUBCUTANEOUS EVERY 8 HOURS SCHEDULED
Status: DISCONTINUED | OUTPATIENT
Start: 2022-11-03 | End: 2022-11-03

## 2022-11-03 RX ORDER — DEXTROAMPHETAMINE SACCHARATE, AMPHETAMINE ASPARTATE, DEXTROAMPHETAMINE SULFATE AND AMPHETAMINE SULFATE 2.5; 2.5; 2.5; 2.5 MG/1; MG/1; MG/1; MG/1
20 TABLET ORAL DAILY
Status: DISCONTINUED | OUTPATIENT
Start: 2022-11-03 | End: 2022-11-03

## 2022-11-03 RX ORDER — PROCHLORPERAZINE EDISYLATE 5 MG/ML
5 INJECTION INTRAMUSCULAR; INTRAVENOUS EVERY 8 HOURS PRN
Status: DISCONTINUED | OUTPATIENT
Start: 2022-11-03 | End: 2022-11-06

## 2022-11-03 NOTE — ED QUICK NOTES
Orders for admission, patient is aware of plan and ready to go upstairs. Any questions, please call ED RN Edna Bah at extension [phone].      Patient Covid vaccination status: Fully vaccinated     COVID Test Ordered in ED: Rapid SARS-CoV-2 by PCR    COVID Suspicion at Admission: No risk with negative rapid    Running Infusions:  None    Mental Status/LOC at time of transport: A/Ox3    Other pertinent information:   CIWA score: N/A   NIH score:  N/A

## 2022-11-03 NOTE — ED INITIAL ASSESSMENT (HPI)
Pt to ED with hx of mid chest pain and SOB states became worse over night and woke him out of his sleep. Pt has hx of CHF. States he has had increasing SOB in the past week.

## 2022-11-04 LAB
ALBUMIN SERPL-MCNC: 2.9 G/DL (ref 3.4–5)
ALBUMIN/GLOB SERPL: 1 {RATIO} (ref 1–2)
ALP LIVER SERPL-CCNC: 117 U/L
ALT SERPL-CCNC: 26 U/L
ANION GAP SERPL CALC-SCNC: 6 MMOL/L (ref 0–18)
AST SERPL-CCNC: 12 U/L (ref 15–37)
BASOPHILS # BLD AUTO: 0.06 X10(3) UL (ref 0–0.2)
BASOPHILS NFR BLD AUTO: 0.8 %
BILIRUB SERPL-MCNC: 0.4 MG/DL (ref 0.1–2)
BUN BLD-MCNC: 33 MG/DL (ref 7–18)
CALCIUM BLD-MCNC: 8.3 MG/DL (ref 8.5–10.1)
CHLORIDE SERPL-SCNC: 113 MMOL/L (ref 98–112)
CO2 SERPL-SCNC: 24 MMOL/L (ref 21–32)
CREAT BLD-MCNC: 3 MG/DL
EOSINOPHIL # BLD AUTO: 0.27 X10(3) UL (ref 0–0.7)
EOSINOPHIL NFR BLD AUTO: 3.8 %
ERYTHROCYTE [DISTWIDTH] IN BLOOD BY AUTOMATED COUNT: 12.8 %
GFR SERPLBLD BASED ON 1.73 SQ M-ARVRAT: 25 ML/MIN/1.73M2 (ref 60–?)
GLOBULIN PLAS-MCNC: 3 G/DL (ref 2.8–4.4)
GLUCOSE BLD-MCNC: 182 MG/DL (ref 70–99)
GLUCOSE BLD-MCNC: 185 MG/DL (ref 70–99)
GLUCOSE BLD-MCNC: 186 MG/DL (ref 70–99)
GLUCOSE BLD-MCNC: 222 MG/DL (ref 70–99)
HCT VFR BLD AUTO: 40.3 %
HGB BLD-MCNC: 14.3 G/DL
IMM GRANULOCYTES # BLD AUTO: 0.02 X10(3) UL (ref 0–1)
IMM GRANULOCYTES NFR BLD: 0.3 %
LYMPHOCYTES # BLD AUTO: 2.22 X10(3) UL (ref 1–4)
LYMPHOCYTES NFR BLD AUTO: 31 %
MAGNESIUM SERPL-MCNC: 2.1 MG/DL (ref 1.6–2.6)
MCH RBC QN AUTO: 30.4 PG (ref 26–34)
MCHC RBC AUTO-ENTMCNC: 35.5 G/DL (ref 31–37)
MCV RBC AUTO: 85.6 FL
MONOCYTES # BLD AUTO: 0.62 X10(3) UL (ref 0.1–1)
MONOCYTES NFR BLD AUTO: 8.7 %
NEUTROPHILS # BLD AUTO: 3.96 X10 (3) UL (ref 1.5–7.7)
NEUTROPHILS # BLD AUTO: 3.96 X10(3) UL (ref 1.5–7.7)
NEUTROPHILS NFR BLD AUTO: 55.4 %
OSMOLALITY SERPL CALC.SUM OF ELEC: 308 MOSM/KG (ref 275–295)
PLATELET # BLD AUTO: 228 10(3)UL (ref 150–450)
POTASSIUM SERPL-SCNC: 3.5 MMOL/L (ref 3.5–5.1)
POTASSIUM SERPL-SCNC: 3.6 MMOL/L (ref 3.5–5.1)
POTASSIUM SERPL-SCNC: 3.8 MMOL/L (ref 3.5–5.1)
PROT SERPL-MCNC: 5.9 G/DL (ref 6.4–8.2)
RBC # BLD AUTO: 4.71 X10(6)UL
SODIUM SERPL-SCNC: 143 MMOL/L (ref 136–145)
WBC # BLD AUTO: 7.2 X10(3) UL (ref 4–11)

## 2022-11-04 PROCEDURE — 84132 ASSAY OF SERUM POTASSIUM: CPT | Performed by: HOSPITALIST

## 2022-11-04 PROCEDURE — 80053 COMPREHEN METABOLIC PANEL: CPT | Performed by: HOSPITALIST

## 2022-11-04 PROCEDURE — 82962 GLUCOSE BLOOD TEST: CPT

## 2022-11-04 PROCEDURE — 84132 ASSAY OF SERUM POTASSIUM: CPT | Performed by: INTERNAL MEDICINE

## 2022-11-04 PROCEDURE — 83735 ASSAY OF MAGNESIUM: CPT | Performed by: HOSPITALIST

## 2022-11-04 PROCEDURE — 85025 COMPLETE CBC W/AUTO DIFF WBC: CPT | Performed by: HOSPITALIST

## 2022-11-04 RX ORDER — NICOTINE POLACRILEX 4 MG
15 LOZENGE BUCCAL
Status: DISCONTINUED | OUTPATIENT
Start: 2022-11-04 | End: 2022-11-06

## 2022-11-04 RX ORDER — EPLERENONE 25 MG/1
50 TABLET, FILM COATED ORAL DAILY
Status: DISCONTINUED | OUTPATIENT
Start: 2022-11-04 | End: 2022-11-06

## 2022-11-04 RX ORDER — CARVEDILOL 12.5 MG/1
25 TABLET ORAL ONCE
Status: DISCONTINUED | OUTPATIENT
Start: 2022-11-04 | End: 2022-11-06

## 2022-11-04 RX ORDER — DEXTROSE MONOHYDRATE 25 G/50ML
50 INJECTION, SOLUTION INTRAVENOUS
Status: DISCONTINUED | OUTPATIENT
Start: 2022-11-04 | End: 2022-11-06

## 2022-11-04 RX ORDER — LAMOTRIGINE 150 MG/1
150 TABLET ORAL DAILY
Status: DISCONTINUED | OUTPATIENT
Start: 2022-11-04 | End: 2022-11-04

## 2022-11-04 RX ORDER — TORSEMIDE 20 MG/1
20 TABLET ORAL
Status: DISCONTINUED | OUTPATIENT
Start: 2022-11-05 | End: 2022-11-06

## 2022-11-04 RX ORDER — FLUTICASONE PROPIONATE 50 MCG
1 SPRAY, SUSPENSION (ML) NASAL 2 TIMES DAILY PRN
Status: DISCONTINUED | OUTPATIENT
Start: 2022-11-04 | End: 2022-11-06

## 2022-11-04 RX ORDER — CARVEDILOL 12.5 MG/1
25 TABLET ORAL 2 TIMES DAILY WITH MEALS
Status: DISCONTINUED | OUTPATIENT
Start: 2022-11-04 | End: 2022-11-06

## 2022-11-04 RX ORDER — NICOTINE POLACRILEX 4 MG
30 LOZENGE BUCCAL
Status: DISCONTINUED | OUTPATIENT
Start: 2022-11-04 | End: 2022-11-06

## 2022-11-04 RX ORDER — POTASSIUM CHLORIDE 20 MEQ/1
20 TABLET, EXTENDED RELEASE ORAL ONCE
Status: COMPLETED | OUTPATIENT
Start: 2022-11-04 | End: 2022-11-04

## 2022-11-04 NOTE — BH RN ADMISSION NOTE
NURSING ADMISSION NOTE      Patient admitted via ambulance from [facility]. Oriented to room. Safety precautions initiated. Bed in low position. Call light in reach. Needs attended.

## 2022-11-04 NOTE — PLAN OF CARE
Problem: Patient/Family Goals  Goal: Patient/Family Long Term Goal  Description: Patient's Long Term Goal: will be able to go home without complication    Interventions:    - See additional Care Plan goals for specific interventions  Outcome: Progressing  Goal: Patient/Family Short Term Goal  Description: Patient's Short Term Goal: will have no more shortness of breath and chest pain    Interventions:   Routine vs, diuretic as ordered, o2 prn, weight monitoring,  - See additional Care Plan goals for specific interventions  Outcome: Progressing     Problem: CARDIOVASCULAR - ADULT  Goal: Maintains optimal cardiac output and hemodynamic stability  Description: INTERVENTIONS:  - Monitor vital signs, rhythm, and trends  - Monitor for bleeding, hypotension and signs of decreased cardiac output  - Evaluate effectiveness of vasoactive medications to optimize hemodynamic stability  - Monitor arterial and/or venous puncture sites for bleeding and/or hematoma  - Assess quality of pulses, skin color and temperature  - Assess for signs of decreased coronary artery perfusion - ex.  Angina  - Evaluate fluid balance, assess for edema, trend weights  Outcome: Progressing  Goal: Absence of cardiac arrhythmias or at baseline  Description: INTERVENTIONS:  - Continuous cardiac monitoring, monitor vital signs, obtain 12 lead EKG if indicated  - Evaluate effectiveness of antiarrhythmic and heart rate control medications as ordered  - Initiate emergency measures for life threatening arrhythmias  - Monitor electrolytes and administer replacement therapy as ordered  Outcome: Progressing     Problem: METABOLIC/FLUID AND ELECTROLYTES - ADULT  Goal: Glucose maintained within prescribed range  Description: INTERVENTIONS:  - Monitor Blood Glucose as ordered  - Assess for signs and symptoms of hyperglycemia and hypoglycemia  - Administer ordered medications to maintain glucose within target range  - Assess barriers to adequate nutritional intake and initiate nutrition consult as needed  - Instruct patient on self management of diabetes  Outcome: Progressing  Goal: Electrolytes maintained within normal limits  Description: INTERVENTIONS:  - Monitor labs and rhythm and assess patient for signs and symptoms of electrolyte imbalances  - Administer electrolyte replacement as ordered  - Monitor response to electrolyte replacements, including rhythm and repeat lab results as appropriate  - Fluid restriction as ordered  - Instruct patient on fluid and nutrition restrictions as appropriate  Outcome: Progressing  Goal: Hemodynamic stability and optimal renal function maintained  Description: INTERVENTIONS:  - Monitor labs and assess for signs and symptoms of volume excess or deficit  - Monitor intake, output and patient weight  - Monitor urine specific gravity, serum osmolarity and serum sodium as indicated or ordered  - Monitor response to interventions for patient's volume status, including labs, urine output, blood pressure (other measures as available)  - Encourage oral intake as appropriate  - Instruct patient on fluid and nutrition restrictions as appropriate  Outcome: Progressing

## 2022-11-04 NOTE — PLAN OF CARE
Assumed care at 0730. Pt is A&Ox4. Pt is on 2L NC, crackles bilateral lungs. NSR on tele, VSS. No complaints of cardiac symptoms. Continent of B&B. Denies pain at this time. Up independently, tolerating well. Plan of care reviewed with patient, verbalizes understanding, all needs addressed at this time. Call light at bedside.      Problem: METABOLIC/FLUID AND ELECTROLYTES - ADULT  Goal: Glucose maintained within prescribed range  Description: INTERVENTIONS:  - Monitor Blood Glucose as ordered  - Assess for signs and symptoms of hyperglycemia and hypoglycemia  - Administer ordered medications to maintain glucose within target range  - Assess barriers to adequate nutritional intake and initiate nutrition consult as needed  - Instruct patient on self management of diabetes  Outcome: Progressing  Goal: Electrolytes maintained within normal limits  Description: INTERVENTIONS:  - Monitor labs and rhythm and assess patient for signs and symptoms of electrolyte imbalances  - Administer electrolyte replacement as ordered  - Monitor response to electrolyte replacements, including rhythm and repeat lab results as appropriate  - Fluid restriction as ordered  - Instruct patient on fluid and nutrition restrictions as appropriate  Outcome: Progressing  Goal: Hemodynamic stability and optimal renal function maintained  Description: INTERVENTIONS:  - Monitor labs and assess for signs and symptoms of volume excess or deficit  - Monitor intake, output and patient weight  - Monitor urine specific gravity, serum osmolarity and serum sodium as indicated or ordered  - Monitor response to interventions for patient's volume status, including labs, urine output, blood pressure (other measures as available)  - Encourage oral intake as appropriate  - Instruct patient on fluid and nutrition restrictions as appropriate  Outcome: Progressing     Problem: CARDIOVASCULAR - ADULT  Goal: Maintains optimal cardiac output and hemodynamic stability  Description: INTERVENTIONS:  - Monitor vital signs, rhythm, and trends  - Monitor for bleeding, hypotension and signs of decreased cardiac output  - Evaluate effectiveness of vasoactive medications to optimize hemodynamic stability  - Monitor arterial and/or venous puncture sites for bleeding and/or hematoma  - Assess quality of pulses, skin color and temperature  - Assess for signs of decreased coronary artery perfusion - ex.  Angina  - Evaluate fluid balance, assess for edema, trend weights  Outcome: Progressing  Goal: Absence of cardiac arrhythmias or at baseline  Description: INTERVENTIONS:  - Continuous cardiac monitoring, monitor vital signs, obtain 12 lead EKG if indicated  - Evaluate effectiveness of antiarrhythmic and heart rate control medications as ordered  - Initiate emergency measures for life threatening arrhythmias  - Monitor electrolytes and administer replacement therapy as ordered  Outcome: Progressing

## 2022-11-05 LAB
ANION GAP SERPL CALC-SCNC: 8 MMOL/L (ref 0–18)
BUN BLD-MCNC: 30 MG/DL (ref 7–18)
CALCIUM BLD-MCNC: 8.8 MG/DL (ref 8.5–10.1)
CHLORIDE SERPL-SCNC: 109 MMOL/L (ref 98–112)
CO2 SERPL-SCNC: 23 MMOL/L (ref 21–32)
CREAT BLD-MCNC: 2.82 MG/DL
GFR SERPLBLD BASED ON 1.73 SQ M-ARVRAT: 27 ML/MIN/1.73M2 (ref 60–?)
GLUCOSE BLD-MCNC: 128 MG/DL (ref 70–99)
GLUCOSE BLD-MCNC: 133 MG/DL (ref 70–99)
GLUCOSE BLD-MCNC: 140 MG/DL (ref 70–99)
GLUCOSE BLD-MCNC: 144 MG/DL (ref 70–99)
GLUCOSE BLD-MCNC: 212 MG/DL (ref 70–99)
OSMOLALITY SERPL CALC.SUM OF ELEC: 298 MOSM/KG (ref 275–295)
POTASSIUM SERPL-SCNC: 3.2 MMOL/L (ref 3.5–5.1)
SODIUM SERPL-SCNC: 140 MMOL/L (ref 136–145)

## 2022-11-05 PROCEDURE — 94640 AIRWAY INHALATION TREATMENT: CPT

## 2022-11-05 PROCEDURE — 80048 BASIC METABOLIC PNL TOTAL CA: CPT | Performed by: INTERNAL MEDICINE

## 2022-11-05 PROCEDURE — 82962 GLUCOSE BLOOD TEST: CPT

## 2022-11-05 RX ORDER — POTASSIUM CHLORIDE 20 MEQ/1
40 TABLET, EXTENDED RELEASE ORAL ONCE
Status: COMPLETED | OUTPATIENT
Start: 2022-11-05 | End: 2022-11-05

## 2022-11-05 NOTE — PLAN OF CARE
Received patient, alert and oriented. Denied chest pain, denied SOB. Up ad osvaldo. Discussed POC. Due meds given. Monitored I&O and daily weight. Instructed to use urinal. Safety measures reinforced, call light within reach. Needs attended to. Will continue to monitor. Problem: Patient/Family Goals  Goal: Patient/Family Long Term Goal  Description: Patient's Long Term Goal: DC Home    Interventions:  - Monitor VS, tele, labs and test results  - Meds as ordered  - Seen by Consults  - See additional Care Plan goals for specific interventions  Outcome: Progressing  Goal: Patient/Family Short Term Goal  Description: Patient's Short Term Goal: More urine output    Interventions:   - Monitor VS, tele, labs and test results  - Meds as ordered  - Monitor I&O; daily weight  - Seen by Consults  - See additional Care Plan goals for specific interventions  Outcome: Progressing     Problem: CARDIOVASCULAR - ADULT  Goal: Maintains optimal cardiac output and hemodynamic stability  Description: INTERVENTIONS:  - Monitor vital signs, rhythm, and trends  - Monitor for bleeding, hypotension and signs of decreased cardiac output  - Evaluate effectiveness of vasoactive medications to optimize hemodynamic stability  - Monitor arterial and/or venous puncture sites for bleeding and/or hematoma  - Assess quality of pulses, skin color and temperature  - Assess for signs of decreased coronary artery perfusion - ex.  Angina  - Evaluate fluid balance, assess for edema, trend weights  Outcome: Progressing  Goal: Absence of cardiac arrhythmias or at baseline  Description: INTERVENTIONS:  - Continuous cardiac monitoring, monitor vital signs, obtain 12 lead EKG if indicated  - Evaluate effectiveness of antiarrhythmic and heart rate control medications as ordered  - Initiate emergency measures for life threatening arrhythmias  - Monitor electrolytes and administer replacement therapy as ordered  Outcome: Progressing     Problem: METABOLIC/FLUID AND ELECTROLYTES - ADULT  Goal: Glucose maintained within prescribed range  Description: INTERVENTIONS:  - Monitor Blood Glucose as ordered  - Assess for signs and symptoms of hyperglycemia and hypoglycemia  - Administer ordered medications to maintain glucose within target range  - Assess barriers to adequate nutritional intake and initiate nutrition consult as needed  - Instruct patient on self management of diabetes  Outcome: Progressing  Goal: Electrolytes maintained within normal limits  Description: INTERVENTIONS:  - Monitor labs and rhythm and assess patient for signs and symptoms of electrolyte imbalances  - Administer electrolyte replacement as ordered  - Monitor response to electrolyte replacements, including rhythm and repeat lab results as appropriate  - Fluid restriction as ordered  - Instruct patient on fluid and nutrition restrictions as appropriate  Outcome: Progressing  Goal: Hemodynamic stability and optimal renal function maintained  Description: INTERVENTIONS:  - Monitor labs and assess for signs and symptoms of volume excess or deficit  - Monitor intake, output and patient weight  - Monitor urine specific gravity, serum osmolarity and serum sodium as indicated or ordered  - Monitor response to interventions for patient's volume status, including labs, urine output, blood pressure (other measures as available)  - Encourage oral intake as appropriate  - Instruct patient on fluid and nutrition restrictions as appropriate  Outcome: Progressing

## 2022-11-05 NOTE — PLAN OF CARE
Assumed care at 0730. Pt is A&Ox4. Pt is on RA, lung sounds diminished bilaterally. NSR on tele, VSS. No complaints of cardiac symptoms. Continent of B&B. Denies pain at this time. Up independently, tolerating well. Plan of care reviewed with patient, verbalizes understanding, all needs addressed at this time. Call light at bedside. Will continue to give diuretics and manage BP. Problem: CARDIOVASCULAR - ADULT  Goal: Maintains optimal cardiac output and hemodynamic stability  Description: INTERVENTIONS:  - Monitor vital signs, rhythm, and trends  - Monitor for bleeding, hypotension and signs of decreased cardiac output  - Evaluate effectiveness of vasoactive medications to optimize hemodynamic stability  - Monitor arterial and/or venous puncture sites for bleeding and/or hematoma  - Assess quality of pulses, skin color and temperature  - Assess for signs of decreased coronary artery perfusion - ex.  Angina  - Evaluate fluid balance, assess for edema, trend weights  Outcome: Progressing  Goal: Absence of cardiac arrhythmias or at baseline  Description: INTERVENTIONS:  - Continuous cardiac monitoring, monitor vital signs, obtain 12 lead EKG if indicated  - Evaluate effectiveness of antiarrhythmic and heart rate control medications as ordered  - Initiate emergency measures for life threatening arrhythmias  - Monitor electrolytes and administer replacement therapy as ordered  Outcome: Progressing     Problem: METABOLIC/FLUID AND ELECTROLYTES - ADULT  Goal: Glucose maintained within prescribed range  Description: INTERVENTIONS:  - Monitor Blood Glucose as ordered  - Assess for signs and symptoms of hyperglycemia and hypoglycemia  - Administer ordered medications to maintain glucose within target range  - Assess barriers to adequate nutritional intake and initiate nutrition consult as needed  - Instruct patient on self management of diabetes  Outcome: Progressing  Goal: Electrolytes maintained within normal limits  Description: INTERVENTIONS:  - Monitor labs and rhythm and assess patient for signs and symptoms of electrolyte imbalances  - Administer electrolyte replacement as ordered  - Monitor response to electrolyte replacements, including rhythm and repeat lab results as appropriate  - Fluid restriction as ordered  - Instruct patient on fluid and nutrition restrictions as appropriate  Outcome: Progressing  Goal: Hemodynamic stability and optimal renal function maintained  Description: INTERVENTIONS:  - Monitor labs and assess for signs and symptoms of volume excess or deficit  - Monitor intake, output and patient weight  - Monitor urine specific gravity, serum osmolarity and serum sodium as indicated or ordered  - Monitor response to interventions for patient's volume status, including labs, urine output, blood pressure (other measures as available)  - Encourage oral intake as appropriate  - Instruct patient on fluid and nutrition restrictions as appropriate  Outcome: Progressing

## 2022-11-06 VITALS
HEIGHT: 74 IN | WEIGHT: 262.56 LBS | RESPIRATION RATE: 18 BRPM | SYSTOLIC BLOOD PRESSURE: 115 MMHG | BODY MASS INDEX: 33.7 KG/M2 | OXYGEN SATURATION: 91 % | DIASTOLIC BLOOD PRESSURE: 72 MMHG | TEMPERATURE: 98 F | HEART RATE: 74 BPM

## 2022-11-06 LAB
ANION GAP SERPL CALC-SCNC: 8 MMOL/L (ref 0–18)
BUN BLD-MCNC: 38 MG/DL (ref 7–18)
CALCIUM BLD-MCNC: 9.3 MG/DL (ref 8.5–10.1)
CHLORIDE SERPL-SCNC: 108 MMOL/L (ref 98–112)
CO2 SERPL-SCNC: 22 MMOL/L (ref 21–32)
CREAT BLD-MCNC: 3.15 MG/DL
GFR SERPLBLD BASED ON 1.73 SQ M-ARVRAT: 24 ML/MIN/1.73M2 (ref 60–?)
GLUCOSE BLD-MCNC: 143 MG/DL (ref 70–99)
GLUCOSE BLD-MCNC: 148 MG/DL (ref 70–99)
GLUCOSE BLD-MCNC: 170 MG/DL (ref 70–99)
OSMOLALITY SERPL CALC.SUM OF ELEC: 298 MOSM/KG (ref 275–295)
POTASSIUM SERPL-SCNC: 3.8 MMOL/L (ref 3.5–5.1)
SODIUM SERPL-SCNC: 138 MMOL/L (ref 136–145)

## 2022-11-06 PROCEDURE — 80048 BASIC METABOLIC PNL TOTAL CA: CPT | Performed by: INTERNAL MEDICINE

## 2022-11-06 PROCEDURE — 82962 GLUCOSE BLOOD TEST: CPT

## 2022-11-06 RX ORDER — TORSEMIDE 20 MG/1
20 TABLET ORAL
Qty: 60 TABLET | Refills: 0 | Status: SHIPPED | OUTPATIENT
Start: 2022-11-06

## 2022-11-06 RX ORDER — CARVEDILOL 25 MG/1
25 TABLET ORAL 2 TIMES DAILY WITH MEALS
Qty: 60 TABLET | Refills: 2
Start: 2022-11-06

## 2022-11-06 RX ORDER — NIFEDIPINE 90 MG/1
90 TABLET, FILM COATED, EXTENDED RELEASE ORAL DAILY
Qty: 30 TABLET | Refills: 1
Start: 2022-11-07

## 2022-11-06 RX ORDER — CLONIDINE HYDROCHLORIDE 0.3 MG/1
0.3 TABLET ORAL 3 TIMES DAILY
Qty: 90 TABLET | Refills: 1
Start: 2022-11-06

## 2022-11-06 RX ORDER — HYDRALAZINE HYDROCHLORIDE 100 MG/1
100 TABLET, FILM COATED ORAL 3 TIMES DAILY
Qty: 90 TABLET | Refills: 1
Start: 2022-11-06

## 2022-11-06 RX ORDER — ISOSORBIDE MONONITRATE 30 MG/1
30 TABLET, EXTENDED RELEASE ORAL DAILY
Qty: 30 TABLET | Refills: 1 | OUTPATIENT
Start: 2022-11-07

## 2022-11-06 RX ORDER — CARVEDILOL 25 MG/1
25 TABLET ORAL 2 TIMES DAILY WITH MEALS
Qty: 60 TABLET | Refills: 6 | Status: SHIPPED | OUTPATIENT
Start: 2022-11-06

## 2022-11-06 RX ORDER — ARIPIPRAZOLE 5 MG/1
15 TABLET ORAL DAILY
Status: SHIPPED | COMMUNITY
Start: 2022-11-06

## 2022-11-06 RX ORDER — DEXTROAMPHETAMINE SACCHARATE, AMPHETAMINE ASPARTATE, DEXTROAMPHETAMINE SULFATE AND AMPHETAMINE SULFATE 5; 5; 5; 5 MG/1; MG/1; MG/1; MG/1
20 TABLET ORAL DAILY
Qty: 15 TABLET | Refills: 0 | Status: SHIPPED | OUTPATIENT
Start: 2022-11-06

## 2022-11-06 RX ORDER — ISOSORBIDE MONONITRATE 30 MG/1
30 TABLET, EXTENDED RELEASE ORAL DAILY
Qty: 30 TABLET | Refills: 6 | Status: SHIPPED | OUTPATIENT
Start: 2022-11-07

## 2022-11-06 NOTE — PLAN OF CARE
NURSING DISCHARGE NOTE    Discharged Home via Wheelchair. Accompanied by Support staff  Belongings Taken by patient/family. PIV removed, tele removed. Medication changes reviewed with patient, discussed follow up appointments. Pt verbalized understanding. All questions answered. Paper script for adderall - noticed missing Dr Mindy Villaseñor, left room to double check with charge that it needed signature before sending with patient. Confirmed signature needed, paged hospitalist. Nurse returned to room 5 minutes later with script but patient had left. Nurse tried to catch patient at entrance before they left but was too late. Hospitalist notified of situation. Problem: CARDIOVASCULAR - ADULT  Goal: Maintains optimal cardiac output and hemodynamic stability  Description: INTERVENTIONS:  - Monitor vital signs, rhythm, and trends  - Monitor for bleeding, hypotension and signs of decreased cardiac output  - Evaluate effectiveness of vasoactive medications to optimize hemodynamic stability  - Monitor arterial and/or venous puncture sites for bleeding and/or hematoma  - Assess quality of pulses, skin color and temperature  - Assess for signs of decreased coronary artery perfusion - ex.  Angina  - Evaluate fluid balance, assess for edema, trend weights  Outcome: Adequate for Discharge  Goal: Absence of cardiac arrhythmias or at baseline  Description: INTERVENTIONS:  - Continuous cardiac monitoring, monitor vital signs, obtain 12 lead EKG if indicated  - Evaluate effectiveness of antiarrhythmic and heart rate control medications as ordered  - Initiate emergency measures for life threatening arrhythmias  - Monitor electrolytes and administer replacement therapy as ordered  Outcome: Adequate for Discharge     Problem: METABOLIC/FLUID AND ELECTROLYTES - ADULT  Goal: Glucose maintained within prescribed range  Description: INTERVENTIONS:  - Monitor Blood Glucose as ordered  - Assess for signs and symptoms of hyperglycemia and hypoglycemia  - Administer ordered medications to maintain glucose within target range  - Assess barriers to adequate nutritional intake and initiate nutrition consult as needed  - Instruct patient on self management of diabetes  Outcome: Adequate for Discharge  Goal: Electrolytes maintained within normal limits  Description: INTERVENTIONS:  - Monitor labs and rhythm and assess patient for signs and symptoms of electrolyte imbalances  - Administer electrolyte replacement as ordered  - Monitor response to electrolyte replacements, including rhythm and repeat lab results as appropriate  - Fluid restriction as ordered  - Instruct patient on fluid and nutrition restrictions as appropriate  Outcome: Adequate for Discharge  Goal: Hemodynamic stability and optimal renal function maintained  Description: INTERVENTIONS:  - Monitor labs and assess for signs and symptoms of volume excess or deficit  - Monitor intake, output and patient weight  - Monitor urine specific gravity, serum osmolarity and serum sodium as indicated or ordered  - Monitor response to interventions for patient's volume status, including labs, urine output, blood pressure (other measures as available)  - Encourage oral intake as appropriate  - Instruct patient on fluid and nutrition restrictions as appropriate  Outcome: Adequate for Discharge

## 2022-11-06 NOTE — PLAN OF CARE
Received patient, alert and oriented. Denied chest pain. Denied SOB. Claimed he feels better and urinated more. Up ad osvaldo. Discussed POC. Due meds given. Monitored I&O, reminded to use urinal. Safety measures reinforced, call light within reach. Needs attended to. Will continue to monitor. Problem: CARDIOVASCULAR - ADULT  Goal: Maintains optimal cardiac output and hemodynamic stability  Description: INTERVENTIONS:  - Monitor vital signs, rhythm, and trends  - Monitor for bleeding, hypotension and signs of decreased cardiac output  - Evaluate effectiveness of vasoactive medications to optimize hemodynamic stability  - Monitor arterial and/or venous puncture sites for bleeding and/or hematoma  - Assess quality of pulses, skin color and temperature  - Assess for signs of decreased coronary artery perfusion - ex.  Angina  - Evaluate fluid balance, assess for edema, trend weights  Outcome: Progressing  Goal: Absence of cardiac arrhythmias or at baseline  Description: INTERVENTIONS:  - Continuous cardiac monitoring, monitor vital signs, obtain 12 lead EKG if indicated  - Evaluate effectiveness of antiarrhythmic and heart rate control medications as ordered  - Initiate emergency measures for life threatening arrhythmias  - Monitor electrolytes and administer replacement therapy as ordered  Outcome: Progressing     Problem: METABOLIC/FLUID AND ELECTROLYTES - ADULT  Goal: Glucose maintained within prescribed range  Description: INTERVENTIONS:  - Monitor Blood Glucose as ordered  - Assess for signs and symptoms of hyperglycemia and hypoglycemia  - Administer ordered medications to maintain glucose within target range  - Assess barriers to adequate nutritional intake and initiate nutrition consult as needed  - Instruct patient on self management of diabetes  Outcome: Progressing  Goal: Electrolytes maintained within normal limits  Description: INTERVENTIONS:  - Monitor labs and rhythm and assess patient for signs and symptoms of electrolyte imbalances  - Administer electrolyte replacement as ordered  - Monitor response to electrolyte replacements, including rhythm and repeat lab results as appropriate  - Fluid restriction as ordered  - Instruct patient on fluid and nutrition restrictions as appropriate  Outcome: Progressing  Goal: Hemodynamic stability and optimal renal function maintained  Description: INTERVENTIONS:  - Monitor labs and assess for signs and symptoms of volume excess or deficit  - Monitor intake, output and patient weight  - Monitor urine specific gravity, serum osmolarity and serum sodium as indicated or ordered  - Monitor response to interventions for patient's volume status, including labs, urine output, blood pressure (other measures as available)  - Encourage oral intake as appropriate  - Instruct patient on fluid and nutrition restrictions as appropriate  Outcome: Progressing

## 2022-11-14 NOTE — PLAN OF CARE
Assumed care around 1930. A/Ox4, slightly drowsy. On RA w/ sats >90. Monitor shows NSR. Voids in BR. No reports of pain. Up ad osvaldo. Plan for possible discharge tomorrow. Safety precautions in place, call light within reach. Problem: Diabetes/Glucose Control  Goal: Glucose maintained within prescribed range  Description: INTERVENTIONS:  - Monitor Blood Glucose as ordered  - Assess for signs and symptoms of hyperglycemia and hypoglycemia  - Administer ordered medications to maintain glucose within target range  - Assess barriers to adequate nutritional intake and initiate nutrition consult as needed  - Instruct patient on self management of diabetes  Outcome: Progressing     Problem: CARDIOVASCULAR - ADULT  Goal: Maintains optimal cardiac output and hemodynamic stability  Description: INTERVENTIONS:  - Monitor vital signs, rhythm, and trends  - Monitor for bleeding, hypotension and signs of decreased cardiac output  - Evaluate effectiveness of vasoactive medications to optimize hemodynamic stability  - Monitor arterial and/or venous puncture sites for bleeding and/or hematoma  - Assess quality of pulses, skin color and temperature  - Assess for signs of decreased coronary artery perfusion - ex.  Angina  - Evaluate fluid balance, assess for edema, trend weights  Outcome: Progressing     Problem: RESPIRATORY - ADULT  Goal: Achieves optimal ventilation and oxygenation  Description: INTERVENTIONS:  - Assess for changes in respiratory status  - Assess for changes in mentation and behavior  - Position to facilitate oxygenation and minimize respiratory effort  - Oxygen supplementation based on oxygen saturation or ABGs  - Provide Smoking Cessation handout, if applicable  - Encourage broncho-pulmonary hygiene including cough, deep breathe, Incentive Spirometry  - Assess the need for suctioning and perform as needed  - Assess and instruct to report SOB or any respiratory difficulty  - Respiratory Therapy support as My chart message sent with MD recommendation indicated  - Manage/alleviate anxiety  - Monitor for signs/symptoms of CO2 retention  Outcome: Progressing     Problem: Patient/Family Goals  Goal: Patient/Family Long Term Goal  Description: Patient's Long Term Goal: to go home    Interventions:  - Report new or worsening condition to physician  - Take medications as prescribed   - diet and exercise as recommended   - Attend follow up appointments as recommended   - MD consults     - See additional Care Plan goals for specific interventions  Outcome: Progressing  Goal: Patient/Family Short Term Goal  Description: Patient's Short Term Goal: \"breathe better\"     Interventions:   - IV abx   -pulm consult   -nebs   - IS use promoted   - wean O2 as tolerated   - CXR  - See additional Care Plan goals for specific interventions  Outcome: Progressing

## 2023-02-02 ENCOUNTER — APPOINTMENT (OUTPATIENT)
Dept: CT IMAGING | Age: 43
End: 2023-02-02
Attending: EMERGENCY MEDICINE
Payer: MEDICAID

## 2023-02-02 ENCOUNTER — HOSPITAL ENCOUNTER (EMERGENCY)
Age: 43
Discharge: LEFT BEFORE DISCHARGE | End: 2023-02-02
Attending: EMERGENCY MEDICINE
Payer: MEDICAID

## 2023-02-02 VITALS
RESPIRATION RATE: 22 BRPM | HEART RATE: 108 BPM | WEIGHT: 250 LBS | DIASTOLIC BLOOD PRESSURE: 130 MMHG | HEIGHT: 74 IN | OXYGEN SATURATION: 97 % | TEMPERATURE: 98 F | SYSTOLIC BLOOD PRESSURE: 193 MMHG | BODY MASS INDEX: 32.08 KG/M2

## 2023-02-02 DIAGNOSIS — R10.9 ABDOMINAL PAIN OF UNKNOWN ETIOLOGY: ICD-10-CM

## 2023-02-02 DIAGNOSIS — I16.0 HYPERTENSIVE URGENCY: Primary | ICD-10-CM

## 2023-02-02 LAB
ALBUMIN SERPL-MCNC: 3.4 G/DL (ref 3.4–5)
ALBUMIN/GLOB SERPL: 0.9 {RATIO} (ref 1–2)
ALP LIVER SERPL-CCNC: 108 U/L
ALT SERPL-CCNC: 21 U/L
ANION GAP SERPL CALC-SCNC: 11 MMOL/L (ref 0–18)
AST SERPL-CCNC: 51 U/L (ref 15–37)
BASOPHILS # BLD AUTO: 0.13 X10(3) UL (ref 0–0.2)
BASOPHILS NFR BLD AUTO: 1.1 %
BILIRUB SERPL-MCNC: 0.7 MG/DL (ref 0.1–2)
BUN BLD-MCNC: 56 MG/DL (ref 7–18)
CALCIUM BLD-MCNC: 8.7 MG/DL (ref 8.5–10.1)
CHLORIDE SERPL-SCNC: 103 MMOL/L (ref 98–112)
CO2 SERPL-SCNC: 23 MMOL/L (ref 21–32)
CREAT BLD-MCNC: 4.88 MG/DL
EOSINOPHIL # BLD AUTO: 0.56 X10(3) UL (ref 0–0.7)
EOSINOPHIL NFR BLD AUTO: 4.9 %
ERYTHROCYTE [DISTWIDTH] IN BLOOD BY AUTOMATED COUNT: 14.3 %
GFR SERPLBLD BASED ON 1.73 SQ M-ARVRAT: 14 ML/MIN/1.73M2 (ref 60–?)
GLOBULIN PLAS-MCNC: 4 G/DL (ref 2.8–4.4)
GLUCOSE BLD-MCNC: 186 MG/DL (ref 70–99)
HCT VFR BLD AUTO: 32.2 %
HGB BLD-MCNC: 10.9 G/DL
IMM GRANULOCYTES # BLD AUTO: 0.08 X10(3) UL (ref 0–1)
IMM GRANULOCYTES NFR BLD: 0.7 %
LIPASE SERPL-CCNC: 106 U/L (ref 13–75)
LIPASE SERPL-CCNC: 420 U/L (ref 73–393)
LYMPHOCYTES # BLD AUTO: 2.01 X10(3) UL (ref 1–4)
LYMPHOCYTES NFR BLD AUTO: 17.7 %
MCH RBC QN AUTO: 30.6 PG (ref 26–34)
MCHC RBC AUTO-ENTMCNC: 33.9 G/DL (ref 31–37)
MCV RBC AUTO: 90.4 FL
MONOCYTES # BLD AUTO: 0.95 X10(3) UL (ref 0.1–1)
MONOCYTES NFR BLD AUTO: 8.4 %
NEUTROPHILS # BLD AUTO: 7.64 X10 (3) UL (ref 1.5–7.7)
NEUTROPHILS # BLD AUTO: 7.64 X10(3) UL (ref 1.5–7.7)
NEUTROPHILS NFR BLD AUTO: 67.2 %
OSMOLALITY SERPL CALC.SUM OF ELEC: 304 MOSM/KG (ref 275–295)
PLATELET # BLD AUTO: 288 10(3)UL (ref 150–450)
POTASSIUM SERPL-SCNC: 3.4 MMOL/L (ref 3.5–5.1)
PROT SERPL-MCNC: 7.4 G/DL (ref 6.4–8.2)
RBC # BLD AUTO: 3.56 X10(6)UL
SODIUM SERPL-SCNC: 137 MMOL/L (ref 136–145)
WBC # BLD AUTO: 11.4 X10(3) UL (ref 4–11)

## 2023-02-02 PROCEDURE — 99285 EMERGENCY DEPT VISIT HI MDM: CPT

## 2023-02-02 PROCEDURE — 99284 EMERGENCY DEPT VISIT MOD MDM: CPT

## 2023-02-02 PROCEDURE — 83690 ASSAY OF LIPASE: CPT | Performed by: EMERGENCY MEDICINE

## 2023-02-02 PROCEDURE — 80053 COMPREHEN METABOLIC PANEL: CPT | Performed by: EMERGENCY MEDICINE

## 2023-02-02 PROCEDURE — 74177 CT ABD & PELVIS W/CONTRAST: CPT | Performed by: EMERGENCY MEDICINE

## 2023-02-02 PROCEDURE — 36415 COLL VENOUS BLD VENIPUNCTURE: CPT

## 2023-02-02 PROCEDURE — 93005 ELECTROCARDIOGRAM TRACING: CPT

## 2023-02-02 PROCEDURE — 93010 ELECTROCARDIOGRAM REPORT: CPT

## 2023-02-02 PROCEDURE — 85025 COMPLETE CBC W/AUTO DIFF WBC: CPT | Performed by: EMERGENCY MEDICINE

## 2023-02-02 RX ORDER — FUROSEMIDE 40 MG/1
1 TABLET ORAL AS DIRECTED
COMMUNITY
Start: 2022-05-23

## 2023-02-02 RX ORDER — DICYCLOMINE HCL 20 MG
20 TABLET ORAL 3 TIMES DAILY
Qty: 20 TABLET | Refills: 0 | Status: SHIPPED | OUTPATIENT
Start: 2023-02-02

## 2023-02-02 NOTE — ED INITIAL ASSESSMENT (HPI)
37 y/o M arrives to ED with c/o intermittent lower abd. Pain since December. Patient reports pain started in hospital after a mitral valve repair at [facility]. Patient reports losing 36 pounds in the past month due to loss of appetite. Patient reports generalized weakness and legs give out.

## 2023-02-03 LAB
ATRIAL RATE: 112 BPM
P AXIS: 60 DEGREES
P-R INTERVAL: 180 MS
Q-T INTERVAL: 352 MS
QRS DURATION: 88 MS
QTC CALCULATION (BEZET): 480 MS
R AXIS: 16 DEGREES
T AXIS: 44 DEGREES
VENTRICULAR RATE: 112 BPM

## 2023-03-10 ENCOUNTER — ORDER TRANSCRIPTION (OUTPATIENT)
Dept: SLEEP CENTER | Age: 43
End: 2023-03-10

## 2023-03-10 DIAGNOSIS — G47.10 HYPERSOMNOLENCE: ICD-10-CM

## 2023-03-10 DIAGNOSIS — R06.83 SNORING: Primary | ICD-10-CM

## 2023-03-21 ENCOUNTER — APPOINTMENT (OUTPATIENT)
Dept: CT IMAGING | Age: 43
End: 2023-03-21
Attending: EMERGENCY MEDICINE
Payer: MEDICAID

## 2023-03-21 ENCOUNTER — HOSPITAL ENCOUNTER (EMERGENCY)
Age: 43
Discharge: HOME | End: 2023-03-21
Attending: EMERGENCY MEDICINE
Payer: MEDICAID

## 2023-03-21 VITALS
BODY MASS INDEX: 33.13 KG/M2 | DIASTOLIC BLOOD PRESSURE: 120 MMHG | HEIGHT: 73 IN | SYSTOLIC BLOOD PRESSURE: 181 MMHG | OXYGEN SATURATION: 97 % | TEMPERATURE: 98 F | HEART RATE: 91 BPM | WEIGHT: 250 LBS | RESPIRATION RATE: 18 BRPM

## 2023-03-21 DIAGNOSIS — Z76.0 ENCOUNTER FOR MEDICATION REFILL: ICD-10-CM

## 2023-03-21 DIAGNOSIS — R10.32 LEFT LOWER QUADRANT ABDOMINAL PAIN: Primary | ICD-10-CM

## 2023-03-21 LAB
ALBUMIN SERPL-MCNC: 3.3 G/DL (ref 3.4–5)
ALBUMIN/GLOB SERPL: 0.8 {RATIO} (ref 1–2)
ALP LIVER SERPL-CCNC: 107 U/L
ALT SERPL-CCNC: 32 U/L
ANION GAP SERPL CALC-SCNC: 7 MMOL/L (ref 0–18)
AST SERPL-CCNC: 49 U/L (ref 15–37)
BASOPHILS # BLD AUTO: 0.12 X10(3) UL (ref 0–0.2)
BASOPHILS NFR BLD AUTO: 1.8 %
BILIRUB SERPL-MCNC: 0.7 MG/DL (ref 0.1–2)
BILIRUB UR QL STRIP.AUTO: NEGATIVE
BUN BLD-MCNC: 31 MG/DL (ref 7–18)
CALCIUM BLD-MCNC: 8.6 MG/DL (ref 8.5–10.1)
CHLORIDE SERPL-SCNC: 109 MMOL/L (ref 98–112)
CLARITY UR REFRACT.AUTO: CLEAR
CO2 SERPL-SCNC: 21 MMOL/L (ref 21–32)
COLOR UR AUTO: YELLOW
CREAT BLD-MCNC: 3.1 MG/DL
EOSINOPHIL # BLD AUTO: 0.23 X10(3) UL (ref 0–0.7)
EOSINOPHIL NFR BLD AUTO: 3.5 %
ERYTHROCYTE [DISTWIDTH] IN BLOOD BY AUTOMATED COUNT: 15.3 %
GFR SERPLBLD BASED ON 1.73 SQ M-ARVRAT: 24 ML/MIN/1.73M2 (ref 60–?)
GLOBULIN PLAS-MCNC: 4.1 G/DL (ref 2.8–4.4)
GLUCOSE BLD-MCNC: 259 MG/DL (ref 70–99)
GLUCOSE UR STRIP.AUTO-MCNC: 500 MG/DL
HCT VFR BLD AUTO: 33.4 %
HGB BLD-MCNC: 11.3 G/DL
IMM GRANULOCYTES # BLD AUTO: 0.02 X10(3) UL (ref 0–1)
IMM GRANULOCYTES NFR BLD: 0.3 %
KETONES UR STRIP.AUTO-MCNC: NEGATIVE MG/DL
LEUKOCYTE ESTERASE UR QL STRIP.AUTO: NEGATIVE
LIPASE SERPL-CCNC: 117 U/L (ref 13–75)
LYMPHOCYTES # BLD AUTO: 1.57 X10(3) UL (ref 1–4)
LYMPHOCYTES NFR BLD AUTO: 24 %
MCH RBC QN AUTO: 30.7 PG (ref 26–34)
MCHC RBC AUTO-ENTMCNC: 33.8 G/DL (ref 31–37)
MCV RBC AUTO: 90.8 FL
MONOCYTES # BLD AUTO: 0.62 X10(3) UL (ref 0.1–1)
MONOCYTES NFR BLD AUTO: 9.5 %
NEUTROPHILS # BLD AUTO: 3.98 X10 (3) UL (ref 1.5–7.7)
NEUTROPHILS # BLD AUTO: 3.98 X10(3) UL (ref 1.5–7.7)
NEUTROPHILS NFR BLD AUTO: 60.9 %
NITRITE UR QL STRIP.AUTO: NEGATIVE
OSMOLALITY SERPL CALC.SUM OF ELEC: 299 MOSM/KG (ref 275–295)
PH UR STRIP.AUTO: 6 [PH] (ref 5–8)
PLATELET # BLD AUTO: 220 10(3)UL (ref 150–450)
POTASSIUM SERPL-SCNC: 4.5 MMOL/L (ref 3.5–5.1)
PROT SERPL-MCNC: 7.4 G/DL (ref 6.4–8.2)
PROT UR STRIP.AUTO-MCNC: >=300 MG/DL
RBC # BLD AUTO: 3.68 X10(6)UL
SODIUM SERPL-SCNC: 137 MMOL/L (ref 136–145)
SP GR UR STRIP.AUTO: 1.02 (ref 1–1.03)
UROBILINOGEN UR STRIP.AUTO-MCNC: 0.2 MG/DL
WBC # BLD AUTO: 6.5 X10(3) UL (ref 4–11)

## 2023-03-21 PROCEDURE — 96375 TX/PRO/DX INJ NEW DRUG ADDON: CPT

## 2023-03-21 PROCEDURE — 99285 EMERGENCY DEPT VISIT HI MDM: CPT

## 2023-03-21 PROCEDURE — 81001 URINALYSIS AUTO W/SCOPE: CPT | Performed by: EMERGENCY MEDICINE

## 2023-03-21 PROCEDURE — 74176 CT ABD & PELVIS W/O CONTRAST: CPT | Performed by: EMERGENCY MEDICINE

## 2023-03-21 PROCEDURE — 99284 EMERGENCY DEPT VISIT MOD MDM: CPT

## 2023-03-21 PROCEDURE — 80053 COMPREHEN METABOLIC PANEL: CPT | Performed by: EMERGENCY MEDICINE

## 2023-03-21 PROCEDURE — 85025 COMPLETE CBC W/AUTO DIFF WBC: CPT | Performed by: EMERGENCY MEDICINE

## 2023-03-21 PROCEDURE — 81015 MICROSCOPIC EXAM OF URINE: CPT | Performed by: EMERGENCY MEDICINE

## 2023-03-21 PROCEDURE — 96374 THER/PROPH/DIAG INJ IV PUSH: CPT

## 2023-03-21 PROCEDURE — 83690 ASSAY OF LIPASE: CPT | Performed by: EMERGENCY MEDICINE

## 2023-03-21 RX ORDER — ONDANSETRON 2 MG/ML
4 INJECTION INTRAMUSCULAR; INTRAVENOUS ONCE
Status: COMPLETED | OUTPATIENT
Start: 2023-03-21 | End: 2023-03-21

## 2023-03-21 RX ORDER — TRAMADOL HYDROCHLORIDE 50 MG/1
TABLET ORAL EVERY 6 HOURS PRN
Qty: 10 TABLET | Refills: 0 | Status: SHIPPED | OUTPATIENT
Start: 2023-03-21 | End: 2023-03-27

## 2023-03-21 RX ORDER — ARIPIPRAZOLE 5 MG/1
5 TABLET ORAL DAILY
Qty: 30 TABLET | Refills: 0 | Status: ON HOLD | OUTPATIENT
Start: 2023-03-21 | End: 2023-03-23

## 2023-03-21 RX ORDER — FLUOXETINE HYDROCHLORIDE 40 MG/1
40 CAPSULE ORAL DAILY
Qty: 30 CAPSULE | Refills: 0 | Status: SHIPPED | OUTPATIENT
Start: 2023-03-21 | End: 2023-04-20

## 2023-03-21 RX ORDER — LAMOTRIGINE 150 MG/1
150 TABLET ORAL DAILY
Qty: 30 TABLET | Refills: 0 | Status: SHIPPED | OUTPATIENT
Start: 2023-03-21 | End: 2023-04-20

## 2023-03-21 RX ORDER — HYDROMORPHONE HYDROCHLORIDE 1 MG/ML
0.5 INJECTION, SOLUTION INTRAMUSCULAR; INTRAVENOUS; SUBCUTANEOUS EVERY 30 MIN PRN
Status: DISCONTINUED | OUTPATIENT
Start: 2023-03-21 | End: 2023-03-21

## 2023-03-21 RX ORDER — DEXTROAMPHETAMINE SACCHARATE, AMPHETAMINE ASPARTATE, DEXTROAMPHETAMINE SULFATE AND AMPHETAMINE SULFATE 5; 5; 5; 5 MG/1; MG/1; MG/1; MG/1
20 TABLET ORAL DAILY
Qty: 30 TABLET | Refills: 0 | Status: SHIPPED | OUTPATIENT
Start: 2023-03-21 | End: 2023-04-20

## 2023-03-21 NOTE — DISCHARGE INSTRUCTIONS
Follow-up for further evaluation with primary physician for medication refills. Follow-up with your infectious disease doctor for reevaluation. Continue present medications. Return if fever again 100.3, increased pain, new or worse symptoms. Call tomorrow for an appointment.

## 2023-03-21 NOTE — ED INITIAL ASSESSMENT (HPI)
Here for refill of medications- also c/o l sided abd pain for a couple of weeks to area where dialysis port was recently removed (approx 2 wks ago)

## 2023-03-23 ENCOUNTER — APPOINTMENT (OUTPATIENT)
Dept: CT IMAGING | Age: 43
DRG: 391 | End: 2023-03-23
Attending: EMERGENCY MEDICINE
Payer: MEDICAID

## 2023-03-23 ENCOUNTER — HOSPITAL ENCOUNTER (OUTPATIENT)
Facility: HOSPITAL | Age: 43
Setting detail: OBSERVATION
LOS: 1 days | Discharge: HOME | DRG: 391 | End: 2023-03-27
Attending: EMERGENCY MEDICINE | Admitting: INTERNAL MEDICINE
Payer: MEDICAID

## 2023-03-23 ENCOUNTER — HOSPITAL ENCOUNTER (INPATIENT)
Facility: HOSPITAL | Age: 43
LOS: 4 days | Discharge: HOME | DRG: 391 | End: 2023-03-27
Attending: EMERGENCY MEDICINE | Admitting: INTERNAL MEDICINE
Payer: MEDICAID

## 2023-03-23 DIAGNOSIS — R10.32 ABDOMINAL PAIN, LEFT LOWER QUADRANT: ICD-10-CM

## 2023-03-23 DIAGNOSIS — N17.9 AKI (ACUTE KIDNEY INJURY) (HCC): Primary | ICD-10-CM

## 2023-03-23 DIAGNOSIS — I10 HYPERTENSION, UNSPECIFIED TYPE: ICD-10-CM

## 2023-03-23 LAB
ALBUMIN SERPL-MCNC: 3.3 G/DL (ref 3.4–5)
ALBUMIN/GLOB SERPL: 0.8 {RATIO} (ref 1–2)
ALP LIVER SERPL-CCNC: 117 U/L
ALT SERPL-CCNC: 29 U/L
ANION GAP SERPL CALC-SCNC: 11 MMOL/L (ref 0–18)
AST SERPL-CCNC: 32 U/L (ref 15–37)
BASOPHILS # BLD AUTO: 0.11 X10(3) UL (ref 0–0.2)
BASOPHILS NFR BLD AUTO: 1.6 %
BILIRUB SERPL-MCNC: 0.7 MG/DL (ref 0.1–2)
BILIRUB UR QL STRIP.AUTO: NEGATIVE
BUN BLD-MCNC: 42 MG/DL (ref 7–18)
CALCIUM BLD-MCNC: 8.6 MG/DL (ref 8.5–10.1)
CHLORIDE SERPL-SCNC: 103 MMOL/L (ref 98–112)
CLARITY UR REFRACT.AUTO: CLEAR
CO2 SERPL-SCNC: 21 MMOL/L (ref 21–32)
COLOR UR AUTO: YELLOW
CREAT BLD-MCNC: 4.21 MG/DL
EOSINOPHIL # BLD AUTO: 0.28 X10(3) UL (ref 0–0.7)
EOSINOPHIL NFR BLD AUTO: 4 %
ERYTHROCYTE [DISTWIDTH] IN BLOOD BY AUTOMATED COUNT: 15 %
GFR SERPLBLD BASED ON 1.73 SQ M-ARVRAT: 17 ML/MIN/1.73M2 (ref 60–?)
GLOBULIN PLAS-MCNC: 4.3 G/DL (ref 2.8–4.4)
GLUCOSE BLD-MCNC: 204 MG/DL (ref 70–99)
GLUCOSE UR STRIP.AUTO-MCNC: 100 MG/DL
HCT VFR BLD AUTO: 35.1 %
HGB BLD-MCNC: 11.8 G/DL
IMM GRANULOCYTES # BLD AUTO: 0.02 X10(3) UL (ref 0–1)
IMM GRANULOCYTES NFR BLD: 0.3 %
KETONES UR STRIP.AUTO-MCNC: NEGATIVE MG/DL
LEUKOCYTE ESTERASE UR QL STRIP.AUTO: NEGATIVE
LIPASE SERPL-CCNC: 74 U/L (ref 13–75)
LYMPHOCYTES # BLD AUTO: 1.94 X10(3) UL (ref 1–4)
LYMPHOCYTES NFR BLD AUTO: 27.5 %
MCH RBC QN AUTO: 30.3 PG (ref 26–34)
MCHC RBC AUTO-ENTMCNC: 33.6 G/DL (ref 31–37)
MCV RBC AUTO: 90 FL
MONOCYTES # BLD AUTO: 0.81 X10(3) UL (ref 0.1–1)
MONOCYTES NFR BLD AUTO: 11.5 %
NEUTROPHILS # BLD AUTO: 3.89 X10 (3) UL (ref 1.5–7.7)
NEUTROPHILS # BLD AUTO: 3.89 X10(3) UL (ref 1.5–7.7)
NEUTROPHILS NFR BLD AUTO: 55.1 %
NITRITE UR QL STRIP.AUTO: NEGATIVE
OSMOLALITY SERPL CALC.SUM OF ELEC: 296 MOSM/KG (ref 275–295)
PH UR STRIP.AUTO: 6.5 [PH] (ref 5–8)
PLATELET # BLD AUTO: 231 10(3)UL (ref 150–450)
POTASSIUM SERPL-SCNC: 4 MMOL/L (ref 3.5–5.1)
PROT SERPL-MCNC: 7.6 G/DL (ref 6.4–8.2)
PROT UR STRIP.AUTO-MCNC: >=300 MG/DL
RBC # BLD AUTO: 3.9 X10(6)UL
SARS-COV-2 RNA RESP QL NAA+PROBE: NOT DETECTED
SODIUM SERPL-SCNC: 135 MMOL/L (ref 136–145)
SP GR UR STRIP.AUTO: 1.02 (ref 1–1.03)
UROBILINOGEN UR STRIP.AUTO-MCNC: 0.2 MG/DL
WBC # BLD AUTO: 7.1 X10(3) UL (ref 4–11)

## 2023-03-23 PROCEDURE — 96374 THER/PROPH/DIAG INJ IV PUSH: CPT

## 2023-03-23 PROCEDURE — 96375 TX/PRO/DX INJ NEW DRUG ADDON: CPT

## 2023-03-23 PROCEDURE — 81015 MICROSCOPIC EXAM OF URINE: CPT | Performed by: EMERGENCY MEDICINE

## 2023-03-23 PROCEDURE — 81001 URINALYSIS AUTO W/SCOPE: CPT | Performed by: EMERGENCY MEDICINE

## 2023-03-23 PROCEDURE — 96376 TX/PRO/DX INJ SAME DRUG ADON: CPT

## 2023-03-23 PROCEDURE — 74176 CT ABD & PELVIS W/O CONTRAST: CPT | Performed by: EMERGENCY MEDICINE

## 2023-03-23 PROCEDURE — 99285 EMERGENCY DEPT VISIT HI MDM: CPT

## 2023-03-23 PROCEDURE — 83690 ASSAY OF LIPASE: CPT | Performed by: EMERGENCY MEDICINE

## 2023-03-23 PROCEDURE — 85025 COMPLETE CBC W/AUTO DIFF WBC: CPT | Performed by: EMERGENCY MEDICINE

## 2023-03-23 PROCEDURE — 80053 COMPREHEN METABOLIC PANEL: CPT | Performed by: EMERGENCY MEDICINE

## 2023-03-23 RX ORDER — MORPHINE SULFATE 4 MG/ML
4 INJECTION, SOLUTION INTRAMUSCULAR; INTRAVENOUS EVERY 30 MIN PRN
Status: DISCONTINUED | OUTPATIENT
Start: 2023-03-23 | End: 2023-03-23

## 2023-03-23 RX ORDER — DICYCLOMINE HCL 20 MG
20 TABLET ORAL 3 TIMES DAILY
Status: DISCONTINUED | OUTPATIENT
Start: 2023-03-23 | End: 2023-03-27

## 2023-03-23 RX ORDER — ARIPIPRAZOLE 5 MG/1
15 TABLET ORAL DAILY
Status: DISCONTINUED | OUTPATIENT
Start: 2023-03-24 | End: 2023-03-27

## 2023-03-23 RX ORDER — NIFEDIPINE 30 MG/1
90 TABLET, EXTENDED RELEASE ORAL DAILY
Status: DISCONTINUED | OUTPATIENT
Start: 2023-03-23 | End: 2023-03-27

## 2023-03-23 RX ORDER — HYDROMORPHONE HYDROCHLORIDE 1 MG/ML
0.2 INJECTION, SOLUTION INTRAMUSCULAR; INTRAVENOUS; SUBCUTANEOUS EVERY 4 HOURS PRN
Status: DISCONTINUED | OUTPATIENT
Start: 2023-03-23 | End: 2023-03-27

## 2023-03-23 RX ORDER — HYDRALAZINE HYDROCHLORIDE 20 MG/ML
10 INJECTION INTRAMUSCULAR; INTRAVENOUS EVERY 6 HOURS PRN
Status: DISCONTINUED | OUTPATIENT
Start: 2023-03-23 | End: 2023-03-27

## 2023-03-23 RX ORDER — ACETAMINOPHEN 325 MG/1
650 TABLET ORAL EVERY 6 HOURS PRN
Status: DISCONTINUED | OUTPATIENT
Start: 2023-03-23 | End: 2023-03-23

## 2023-03-23 RX ORDER — FENOFIBRATE 134 MG/1
134 CAPSULE ORAL NIGHTLY
Status: DISCONTINUED | OUTPATIENT
Start: 2023-03-23 | End: 2023-03-24

## 2023-03-23 RX ORDER — ISOSORBIDE MONONITRATE 30 MG/1
30 TABLET, EXTENDED RELEASE ORAL DAILY
Status: DISCONTINUED | OUTPATIENT
Start: 2023-03-23 | End: 2023-03-27

## 2023-03-23 RX ORDER — ONDANSETRON 2 MG/ML
4 INJECTION INTRAMUSCULAR; INTRAVENOUS EVERY 4 HOURS PRN
Status: DISCONTINUED | OUTPATIENT
Start: 2023-03-23 | End: 2023-03-23

## 2023-03-23 RX ORDER — ONDANSETRON 2 MG/ML
4 INJECTION INTRAMUSCULAR; INTRAVENOUS ONCE
Status: COMPLETED | OUTPATIENT
Start: 2023-03-23 | End: 2023-03-24

## 2023-03-23 RX ORDER — HYDROMORPHONE HYDROCHLORIDE 1 MG/ML
0.8 INJECTION, SOLUTION INTRAMUSCULAR; INTRAVENOUS; SUBCUTANEOUS EVERY 4 HOURS PRN
Status: DISCONTINUED | OUTPATIENT
Start: 2023-03-23 | End: 2023-03-27

## 2023-03-23 RX ORDER — ALBUTEROL SULFATE 90 UG/1
1 AEROSOL, METERED RESPIRATORY (INHALATION) EVERY 6 HOURS PRN
Status: DISCONTINUED | OUTPATIENT
Start: 2023-03-23 | End: 2023-03-27

## 2023-03-23 RX ORDER — DEXTROAMPHETAMINE SACCHARATE, AMPHETAMINE ASPARTATE, DEXTROAMPHETAMINE SULFATE AND AMPHETAMINE SULFATE 2.5; 2.5; 2.5; 2.5 MG/1; MG/1; MG/1; MG/1
20 TABLET ORAL DAILY
Status: DISCONTINUED | OUTPATIENT
Start: 2023-03-24 | End: 2023-03-27

## 2023-03-23 RX ORDER — ONDANSETRON 2 MG/ML
4 INJECTION INTRAMUSCULAR; INTRAVENOUS ONCE
Status: COMPLETED | OUTPATIENT
Start: 2023-03-23 | End: 2023-03-23

## 2023-03-23 RX ORDER — FLUOXETINE HYDROCHLORIDE 20 MG/1
40 CAPSULE ORAL DAILY
Status: DISCONTINUED | OUTPATIENT
Start: 2023-03-23 | End: 2023-03-27

## 2023-03-23 RX ORDER — ACETAMINOPHEN 325 MG/1
650 TABLET ORAL EVERY 6 HOURS PRN
Status: DISCONTINUED | OUTPATIENT
Start: 2023-03-23 | End: 2023-03-27

## 2023-03-23 RX ORDER — MORPHINE SULFATE 4 MG/ML
4 INJECTION, SOLUTION INTRAMUSCULAR; INTRAVENOUS ONCE
Status: COMPLETED | OUTPATIENT
Start: 2023-03-23 | End: 2023-03-23

## 2023-03-23 RX ORDER — CARVEDILOL 12.5 MG/1
25 TABLET ORAL 2 TIMES DAILY WITH MEALS
Status: DISCONTINUED | OUTPATIENT
Start: 2023-03-23 | End: 2023-03-27

## 2023-03-23 RX ORDER — HYDROMORPHONE HYDROCHLORIDE 1 MG/ML
0.4 INJECTION, SOLUTION INTRAMUSCULAR; INTRAVENOUS; SUBCUTANEOUS EVERY 4 HOURS PRN
Status: DISCONTINUED | OUTPATIENT
Start: 2023-03-23 | End: 2023-03-27

## 2023-03-23 NOTE — ED QUICK NOTES
Orders for admission, patient is aware of plan and ready to go upstairs. Any questions, please call ED RN Kristian Cantrell  at extension [phone]. Vaccinated? yes  Type of COVID test sent: RApid  COVID Suspicion level: Low      Titratable drug(s) infusing: none  Rate:n/a    LOC at time of transport: A+Ox3    Other pertinent information:n/a    CIWA score=n/a  NIH score=n/a

## 2023-03-24 ENCOUNTER — ANESTHESIA EVENT (OUTPATIENT)
Dept: ENDOSCOPY | Facility: HOSPITAL | Age: 43
End: 2023-03-24
Payer: MEDICAID

## 2023-03-24 LAB
ANION GAP SERPL CALC-SCNC: 11 MMOL/L (ref 0–18)
BASOPHILS # BLD AUTO: 0.08 X10(3) UL (ref 0–0.2)
BASOPHILS NFR BLD AUTO: 1.1 %
BUN BLD-MCNC: 42 MG/DL (ref 7–18)
CALCIUM BLD-MCNC: 8.6 MG/DL (ref 8.5–10.1)
CHLORIDE SERPL-SCNC: 105 MMOL/L (ref 98–112)
CO2 SERPL-SCNC: 23 MMOL/L (ref 21–32)
CREAT BLD-MCNC: 3.8 MG/DL
CRP SERPL-MCNC: <0.29 MG/DL (ref ?–0.3)
EOSINOPHIL # BLD AUTO: 0.3 X10(3) UL (ref 0–0.7)
EOSINOPHIL NFR BLD AUTO: 4.1 %
ERYTHROCYTE [DISTWIDTH] IN BLOOD BY AUTOMATED COUNT: 14.9 %
ERYTHROCYTE [SEDIMENTATION RATE] IN BLOOD: 31 MM/HR
GFR SERPLBLD BASED ON 1.73 SQ M-ARVRAT: 19 ML/MIN/1.73M2 (ref 60–?)
GLUCOSE BLD-MCNC: 143 MG/DL (ref 70–99)
GLUCOSE BLD-MCNC: 160 MG/DL (ref 70–99)
GLUCOSE BLD-MCNC: 171 MG/DL (ref 70–99)
GLUCOSE BLD-MCNC: 190 MG/DL (ref 70–99)
GLUCOSE BLD-MCNC: 215 MG/DL (ref 70–99)
HCT VFR BLD AUTO: 34.8 %
HGB BLD-MCNC: 11.6 G/DL
IMM GRANULOCYTES # BLD AUTO: 0.02 X10(3) UL (ref 0–1)
IMM GRANULOCYTES NFR BLD: 0.3 %
LYMPHOCYTES # BLD AUTO: 2.38 X10(3) UL (ref 1–4)
LYMPHOCYTES NFR BLD AUTO: 32.9 %
MCH RBC QN AUTO: 30.5 PG (ref 26–34)
MCHC RBC AUTO-ENTMCNC: 33.3 G/DL (ref 31–37)
MCV RBC AUTO: 91.6 FL
MONOCYTES # BLD AUTO: 0.94 X10(3) UL (ref 0.1–1)
MONOCYTES NFR BLD AUTO: 13 %
NEUTROPHILS # BLD AUTO: 3.52 X10 (3) UL (ref 1.5–7.7)
NEUTROPHILS # BLD AUTO: 3.52 X10(3) UL (ref 1.5–7.7)
NEUTROPHILS NFR BLD AUTO: 48.6 %
OSMOLALITY SERPL CALC.SUM OF ELEC: 302 MOSM/KG (ref 275–295)
PLATELET # BLD AUTO: 226 10(3)UL (ref 150–450)
POTASSIUM SERPL-SCNC: 3.6 MMOL/L (ref 3.5–5.1)
RBC # BLD AUTO: 3.8 X10(6)UL
SODIUM SERPL-SCNC: 139 MMOL/L (ref 136–145)
WBC # BLD AUTO: 7.2 X10(3) UL (ref 4–11)

## 2023-03-24 PROCEDURE — 97161 PT EVAL LOW COMPLEX 20 MIN: CPT

## 2023-03-24 PROCEDURE — 85652 RBC SED RATE AUTOMATED: CPT | Performed by: INTERNAL MEDICINE

## 2023-03-24 PROCEDURE — 82962 GLUCOSE BLOOD TEST: CPT

## 2023-03-24 PROCEDURE — 85025 COMPLETE CBC W/AUTO DIFF WBC: CPT | Performed by: INTERNAL MEDICINE

## 2023-03-24 PROCEDURE — 97116 GAIT TRAINING THERAPY: CPT

## 2023-03-24 PROCEDURE — 84156 ASSAY OF PROTEIN URINE: CPT | Performed by: STUDENT IN AN ORGANIZED HEALTH CARE EDUCATION/TRAINING PROGRAM

## 2023-03-24 PROCEDURE — 81001 URINALYSIS AUTO W/SCOPE: CPT | Performed by: STUDENT IN AN ORGANIZED HEALTH CARE EDUCATION/TRAINING PROGRAM

## 2023-03-24 PROCEDURE — 80048 BASIC METABOLIC PNL TOTAL CA: CPT | Performed by: INTERNAL MEDICINE

## 2023-03-24 PROCEDURE — 82570 ASSAY OF URINE CREATININE: CPT | Performed by: STUDENT IN AN ORGANIZED HEALTH CARE EDUCATION/TRAINING PROGRAM

## 2023-03-24 PROCEDURE — 82043 UR ALBUMIN QUANTITATIVE: CPT | Performed by: STUDENT IN AN ORGANIZED HEALTH CARE EDUCATION/TRAINING PROGRAM

## 2023-03-24 PROCEDURE — 86140 C-REACTIVE PROTEIN: CPT | Performed by: INTERNAL MEDICINE

## 2023-03-24 PROCEDURE — 94640 AIRWAY INHALATION TREATMENT: CPT

## 2023-03-24 RX ORDER — ATORVASTATIN CALCIUM 20 MG/1
20 TABLET, FILM COATED ORAL NIGHTLY
Status: DISCONTINUED | OUTPATIENT
Start: 2023-03-24 | End: 2023-03-27

## 2023-03-24 RX ORDER — DEXTROSE MONOHYDRATE 25 G/50ML
50 INJECTION, SOLUTION INTRAVENOUS
Status: DISCONTINUED | OUTPATIENT
Start: 2023-03-24 | End: 2023-03-27

## 2023-03-24 RX ORDER — MAGNESIUM CARB/ALUMINUM HYDROX 105-160MG
296 TABLET,CHEWABLE ORAL ONCE
Status: DISCONTINUED | OUTPATIENT
Start: 2023-03-24 | End: 2023-03-25

## 2023-03-24 RX ORDER — ATORVASTATIN CALCIUM 20 MG/1
TABLET, FILM COATED ORAL
COMMUNITY
End: 2023-05-09

## 2023-03-24 RX ORDER — SODIUM CHLORIDE 9 MG/ML
INJECTION, SOLUTION INTRAVENOUS CONTINUOUS
Status: ACTIVE | OUTPATIENT
Start: 2023-03-24 | End: 2023-03-24

## 2023-03-24 RX ORDER — TRAMADOL HYDROCHLORIDE 50 MG/1
50 TABLET ORAL EVERY 12 HOURS PRN
Status: DISCONTINUED | OUTPATIENT
Start: 2023-03-24 | End: 2023-03-27

## 2023-03-24 RX ORDER — NICOTINE POLACRILEX 4 MG
15 LOZENGE BUCCAL
Status: DISCONTINUED | OUTPATIENT
Start: 2023-03-24 | End: 2023-03-27

## 2023-03-24 RX ORDER — NICOTINE POLACRILEX 4 MG
30 LOZENGE BUCCAL
Status: DISCONTINUED | OUTPATIENT
Start: 2023-03-24 | End: 2023-03-27

## 2023-03-24 NOTE — PROGRESS NOTES
Bowel prep started at 1300. Pt states that it is not sitting right. Antiemetic offered but pt declined. Pt encouraged to drink prep and educated on the importance of having clear bowel movements in orders to have the scope tomorrow. Pt verbalized understanding and says he will keep trying. 1830 IVF started (pt was SL during the day). Per nephrology, pt limited to 1L of fluids. Will pass on to night shift to discontinue fluids after bag is complete.

## 2023-03-24 NOTE — PLAN OF CARE
PT HTN noted; other VSS. HTN managed with PRN hydralazine. Tele; NSR/ST (HR 's). Voids. No BM since admission; CDIFF Iso per pt report of diarrhea a home. Saline locked. Pt on room air; denies LIZBETH/SOB/lightheadedness. Up with SB, steady gait. Renal diet; denies nausea. Accuchecks starting today. Hypoglycemia protocol in. SCDs applied. 2 old incisions with stitches intact; pt reports he was supposed to get them removed in [place] today. Fall & safety precautions in place. POC discussed.      CARE PLAN:   Problem: Diabetes/Glucose Control  Goal: Glucose maintained within prescribed range  Description: INTERVENTIONS:  - Monitor Blood Glucose as ordered  - Assess for signs and symptoms of hyperglycemia and hypoglycemia  - Administer ordered medications to maintain glucose within target range  - Assess barriers to adequate nutritional intake and initiate nutrition consult as needed  - Instruct patient on self management of diabetes  Outcome: Progressing     Problem: GENITOURINARY  Goal: Maintain optimal urinary function  Description: Interventions:  - Assess ability to void  - Assess for bladder distention  - Monitor creatinine and BUN  - Monitor intake and output  - Insert urinary catheter as ordered  - Obtain appropriate imaging as ordered  Outcome: Progressing     Problem: Pre-existing medical condition  Goal: Maintenance of stable medical condition  Description: INTERVENTIONS:  - Administer medications and treatments as ordered  - Monitor for and notify internist of changes as needed  - Conduct history & physical assessment within 24 hours of admission  - Assess & evaluate for further medical treatment if indicated  Outcome: Progressing     Problem: SAFETY ADULT - FALL  Goal: Free from fall injury  Description: INTERVENTIONS AS NEEDED:  - Assess patient for physical needs  - Identify cognitive and physical deficits and behaviors that affect risk of falls  - [place] fall precautions as indicated by assessment  - Educate patient/family on patient safety including physical imitations  - Instruct patient to call for assistance with activity based on assessment  - Modify environment to reduce risk of injury  - Provide assistive devices as appropriate  - Consider occupational and/or physical therapy consult to assist with strengthening/mobility  Outcome: Progressing     Problem: Safety - Medical Bed  Goal: Safety Maintained While Utilizing Medical Bed  Description: INTERVENTIONS AS NEEDED:  - Assess patient for physical needs  - Modify environment to reduce risk of injury  Outcome: Progressing     Problem: Risk for Infection  Goal: Absence of fever/infection during anticipated neutropenic period  Description: INTERVENTIONS:  - Monitor WBC  - Implement neutropenic guidelines  Outcome: Progressing     Problem: CARDIOVASCULAR - ADULT  Goal: Maintains optimal cardiac output and hemodynamic stability  Description: INTERVENTIONS:  - Monitor vital signs, rhythm, and trends  - Monitor for bleeding, hypotension and signs of decreased cardiac output  - Evaluate effectiveness of vasoactive medications to optimize hemodynamic stability  - Monitor arterial and/or venous puncture sites for bleeding and/or hematoma  - Assess quality of pulses, skin color and temperature  - Assess for signs of decreased coronary artery perfusion - ex.  Angina  - Evaluate fluid balance, assess for edema, trend weights  Outcome: Progressing  Goal: Absence of cardiac arrhythmias or at baseline  Description: INTERVENTIONS:  - Continuous cardiac monitoring, monitor vital signs, obtain 12 lead EKG if indicated  - Evaluate effectiveness of antiarrhythmic and heart rate control medications as ordered  - Initiate emergency measures for life threatening arrhythmias  - Monitor electrolytes and administer replacement therapy as ordered  Outcome: Progressing     Problem: GASTROINTESTINAL - ADULT  Goal: Minimal or absence of nausea and vomiting  Description: INTERVENTIONS:  - Maintain adequate hydration with IV or PO as ordered and tolerated  - Nasogastric tube to low intermittent suction as ordered  - Evaluate effectiveness of ordered antiemetic medications  - Provide nonpharmacologic comfort measures as appropriate  - Advance diet as tolerated, if ordered  - Obtain nutritional consult as needed  - Evaluate fluid balance  Outcome: Progressing  Goal: Maintains or returns to baseline bowel function  Description: INTERVENTIONS:  - Assess bowel function  - Maintain adequate hydration with IV or PO as ordered and tolerated  - Evaluate effectiveness of GI medications  - Encourage mobilization and activity  - Obtain nutritional consult as needed  - Establish a toileting routine/schedule  - Consider collaborating with pharmacy to review patient's medication profile  Outcome: Progressing  Goal: Maintains adequate nutritional intake (undernourished)  Description: INTERVENTIONS:  - Monitor percentage of each meal consumed  - Identify factors contributing to decreased intake, treat as appropriate  - Assist with meals as needed  - Monitor I&O, WT and lab values  - Obtain nutritional consult as needed  - Optimize oral hygiene and moisture  - Encourage food from home; allow for food preferences  - Enhance eating environment  Outcome: Progressing  Goal: Achieves appropriate nutritional intake (bariatric)  Description: INTERVENTIONS:  - Monitor for over-consumption  - Identify factors contributing to increased intake, treat as appropriate  - Monitor I&O, WT and lab values  - Obtain nutritional consult as needed  - Evaluate psychosocial factors contributing to over-consumption  Outcome: Progressing

## 2023-03-24 NOTE — PROGRESS NOTES
NURSING ADMISSION NOTE      Patient admitted via stretcher by EMTs from  ER to EDW SCU. Oriented to room. Safety precautions initiated. Bed in low position. Call light in reach. PT HTN noted; treated w/ PRN hydralazine per orders. Other VSS. Pt reports pain managed with IV dilaudid per orders. Fall & safety precautions in place. MDs aware of pt admit to SCU; med rec reviewed by hospitalists and no new orders by nephrology who will see tomorrow.

## 2023-03-25 ENCOUNTER — ANESTHESIA (OUTPATIENT)
Dept: ENDOSCOPY | Facility: HOSPITAL | Age: 43
End: 2023-03-25
Payer: MEDICAID

## 2023-03-25 LAB
ALBUMIN SERPL-MCNC: 2.9 G/DL (ref 3.4–5)
ANION GAP SERPL CALC-SCNC: 8 MMOL/L (ref 0–18)
BASOPHILS # BLD AUTO: 0.07 X10(3) UL (ref 0–0.2)
BASOPHILS NFR BLD AUTO: 1.1 %
BILIRUB UR QL STRIP.AUTO: NEGATIVE
BUN BLD-MCNC: 41 MG/DL (ref 7–18)
CALCIUM BLD-MCNC: 8.3 MG/DL (ref 8.5–10.1)
CHLORIDE SERPL-SCNC: 107 MMOL/L (ref 98–112)
CLARITY UR REFRACT.AUTO: CLEAR
CO2 SERPL-SCNC: 28 MMOL/L (ref 21–32)
COLOR UR AUTO: YELLOW
CREAT BLD-MCNC: 3.25 MG/DL
CREAT UR-SCNC: 171 MG/DL
CREAT UR-SCNC: 176 MG/DL
CREAT UR-SCNC: 2.92 G/24 HR (ref 0.95–2.49)
EOSINOPHIL # BLD AUTO: 0.25 X10(3) UL (ref 0–0.7)
EOSINOPHIL NFR BLD AUTO: 3.9 %
ERYTHROCYTE [DISTWIDTH] IN BLOOD BY AUTOMATED COUNT: 14.5 %
GFR SERPLBLD BASED ON 1.73 SQ M-ARVRAT: 23 ML/MIN/1.73M2 (ref 60–?)
GLUCOSE BLD-MCNC: 120 MG/DL (ref 70–99)
GLUCOSE BLD-MCNC: 128 MG/DL (ref 70–99)
GLUCOSE BLD-MCNC: 137 MG/DL (ref 70–99)
GLUCOSE BLD-MCNC: 152 MG/DL (ref 70–99)
GLUCOSE BLD-MCNC: 201 MG/DL (ref 70–99)
GLUCOSE UR STRIP.AUTO-MCNC: 150 MG/DL
HCT VFR BLD AUTO: 30.6 %
HGB BLD-MCNC: 10.6 G/DL
IMM GRANULOCYTES # BLD AUTO: 0.01 X10(3) UL (ref 0–1)
IMM GRANULOCYTES NFR BLD: 0.2 %
KETONES UR STRIP.AUTO-MCNC: NEGATIVE MG/DL
LEUKOCYTE ESTERASE UR QL STRIP.AUTO: NEGATIVE
LYMPHOCYTES # BLD AUTO: 2.35 X10(3) UL (ref 1–4)
LYMPHOCYTES NFR BLD AUTO: 36.7 %
MCH RBC QN AUTO: 31.5 PG (ref 26–34)
MCHC RBC AUTO-ENTMCNC: 34.6 G/DL (ref 31–37)
MCV RBC AUTO: 90.8 FL
MICROALBUMIN UR-MCNC: 202 MG/DL
MICROALBUMIN/CREAT 24H UR-RTO: 1147.7 UG/MG (ref ?–30)
MONOCYTES # BLD AUTO: 0.62 X10(3) UL (ref 0.1–1)
MONOCYTES NFR BLD AUTO: 9.7 %
NEUTROPHILS # BLD AUTO: 3.1 X10 (3) UL (ref 1.5–7.7)
NEUTROPHILS # BLD AUTO: 3.1 X10(3) UL (ref 1.5–7.7)
NEUTROPHILS NFR BLD AUTO: 48.4 %
NITRITE UR QL STRIP.AUTO: NEGATIVE
OSMOLALITY SERPL CALC.SUM OF ELEC: 307 MOSM/KG (ref 275–295)
PH UR STRIP.AUTO: 6 [PH] (ref 5–8)
PHOSPHATE SERPL-MCNC: 3.1 MG/DL (ref 2.5–4.9)
PLATELET # BLD AUTO: 205 10(3)UL (ref 150–450)
POTASSIUM SERPL-SCNC: 3.2 MMOL/L (ref 3.5–5.1)
PROT UR STRIP.AUTO-MCNC: >=500 MG/DL
PROT UR-MCNC: 260.5 MG/DL
PROT/CREAT UR-RTO: 1.52
RBC # BLD AUTO: 3.37 X10(6)UL
RBC UR QL AUTO: NEGATIVE
SODIUM SERPL-SCNC: 143 MMOL/L (ref 136–145)
SP GR UR STRIP.AUTO: 1.02 (ref 1–1.03)
SPECIMEN VOL UR: 2450 ML
UROBILINOGEN UR STRIP.AUTO-MCNC: <2 MG/DL
WBC # BLD AUTO: 6.4 X10(3) UL (ref 4–11)

## 2023-03-25 PROCEDURE — 82962 GLUCOSE BLOOD TEST: CPT

## 2023-03-25 PROCEDURE — 80069 RENAL FUNCTION PANEL: CPT | Performed by: STUDENT IN AN ORGANIZED HEALTH CARE EDUCATION/TRAINING PROGRAM

## 2023-03-25 PROCEDURE — 82570 ASSAY OF URINE CREATININE: CPT | Performed by: STUDENT IN AN ORGANIZED HEALTH CARE EDUCATION/TRAINING PROGRAM

## 2023-03-25 PROCEDURE — 85025 COMPLETE CBC W/AUTO DIFF WBC: CPT | Performed by: INTERNAL MEDICINE

## 2023-03-25 RX ORDER — POTASSIUM CHLORIDE 14.9 MG/ML
20 INJECTION INTRAVENOUS ONCE
Status: COMPLETED | OUTPATIENT
Start: 2023-03-25 | End: 2023-03-25

## 2023-03-25 NOTE — PROGRESS NOTES
BRIEF DULY GI PROGRESS NOTE    Reviewed case w/ bedside RN over phone and Endo RN this morning. Significant challenges w/ prep overnight, still not running adequately clear despite more PEG taken since last night. Given the plan for diag MAC COL in setting of ongoing a/c (recent PE), would advise we continue strict CLD + prep today and plan for COL tomorrow instead. This may improve visualization + safety of exam + future procedure planning (particularly if lesions are found). Bedside RN will update pt accordingly in the meantime.     Cheng Story MD  [id number] [facility]  Department of Gastroenterology

## 2023-03-25 NOTE — PLAN OF CARE
Plan to finish the last 2L of bowel prep this morning and afternoon. Will assess stool when complete and update GI on progress. CLD today and NPO at midnight for scope tomorrow. IVSL. Continue to collect 24 hour urine sample. Will complete at 2000 tonight. Up ad osvadlo. Plan of care reviewed with pt. All questions answered. 1800 Pt has not yet finished the last 2L of [place] prep. Last stool was at 1400 Yellow cloudy liquid. Prep has been encouraged since early this morning but pt states it hasnt been sitting well. Educated on importance of finishing prep in order to see if stool is clear enough for 0830 scope tomorrow. Pt verbalized understanding and states he will try and finish it soon.      Problem: Diabetes/Glucose Control  Goal: Glucose maintained within prescribed range  Description: INTERVENTIONS:  - Monitor Blood Glucose as ordered  - Assess for signs and symptoms of hyperglycemia and hypoglycemia  - Administer ordered medications to maintain glucose within target range  - Assess barriers to adequate nutritional intake and initiate nutrition consult as needed  - Instruct patient on self management of diabetes  Outcome: Progressing     Problem: GENITOURINARY  Goal: Maintain optimal urinary function  Description: Interventions:  - Assess ability to void  - Assess for bladder distention  - Monitor creatinine and BUN  - Monitor intake and output  - Insert urinary catheter as ordered  - Obtain appropriate imaging as ordered  Outcome: Progressing     Problem: Pre-existing medical condition  Goal: Maintenance of stable medical condition  Description: INTERVENTIONS:  - Administer medications and treatments as ordered  - Monitor for and notify internist of changes as needed  - Conduct history & physical assessment within 24 hours of admission  - Assess & evaluate for further medical treatment if indicated  Outcome: Progressing     Problem: SAFETY ADULT - FALL  Goal: Free from fall injury  Description: INTERVENTIONS AS NEEDED:  - Assess patient for physical needs  - Identify cognitive and physical deficits and behaviors that affect risk of falls  - [place] fall precautions as indicated by assessment  - Educate patient/family on patient safety including physical imitations  - Instruct patient to call for assistance with activity based on assessment  - Modify environment to reduce risk of injury  - Provide assistive devices as appropriate  - Consider occupational and/or physical therapy consult to assist with strengthening/mobility  - Encourage toileting schedule     Problem: Risk for Infection  Goal: Absence of fever/infection during anticipated neutropenic period  Description: INTERVENTIONS:  - Monitor WBC  - Implement neutropenic guidelines  Outcome: Progressing     Problem: CARDIOVASCULAR - ADULT  Goal: Maintains optimal cardiac output and hemodynamic stability  Description: INTERVENTIONS:  - Monitor vital signs, rhythm, and trends  - Monitor for bleeding, hypotension and signs of decreased cardiac output  - Evaluate effectiveness of vasoactive medications to optimize hemodynamic stability  - Monitor arterial and/or venous puncture sites for bleeding and/or hematoma  - Assess quality of pulses, skin color and temperature  - Assess for signs of decreased coronary artery perfusion - ex.  Angina  - Evaluate fluid balance, assess for edema, trend weights  Outcome: Progressing  Goal: Absence of cardiac arrhythmias or at baseline  Description: INTERVENTIONS:  - Continuous cardiac monitoring, monitor vital signs, obtain 12 lead EKG if indicated  - Evaluate effectiveness of antiarrhythmic and heart rate control medications as ordered  - Initiate emergency measures for life threatening arrhythmias  - Monitor electrolytes and administer replacement therapy as ordered  Outcome: Progressing     Problem: GASTROINTESTINAL - ADULT  Goal: Minimal or absence of nausea and vomiting  Description: INTERVENTIONS:  - Maintain adequate hydration with IV or PO as ordered and tolerated  - Nasogastric tube to low intermittent suction as ordered  - Evaluate effectiveness of ordered antiemetic medications  - Provide nonpharmacologic comfort measures as appropriate  - Advance diet as tolerated, if ordered  - Obtain nutritional consult as needed  - Evaluate fluid balance  Outcome: Progressing  Goal: Maintains or returns to baseline bowel function  Description: INTERVENTIONS:  - Assess bowel function  - Maintain adequate hydration with IV or PO as ordered and tolerated  - Evaluate effectiveness of GI medications  - Encourage mobilization and activity  - Obtain nutritional consult as needed  - Establish a toileting routine/schedule  - Consider collaborating with pharmacy to review patient's medication profile  Outcome: Progressing  Goal: Maintains adequate nutritional intake (undernourished)  Description: INTERVENTIONS:  - Monitor percentage of each meal consumed  - Identify factors contributing to decreased intake, treat as appropriate  - Assist with meals as needed  - Monitor I&O, WT and lab values  - Obtain nutritional consult as needed  - Optimize oral hygiene and moisture  - Encourage food from home; allow for food preferences  - Enhance eating environment  Outcome: Progressing  Goal: Achieves appropriate nutritional intake (bariatric)  Description: INTERVENTIONS:  - Monitor for over-consumption  - Identify factors contributing to increased intake, treat as appropriate  - Monitor I&O, WT and lab values  - Obtain nutritional consult as needed  - Evaluate psychosocial factors contributing to over-consumption  Outcome: Progressing

## 2023-03-25 NOTE — PROGRESS NOTES
3/24/2023, 2342: Pt has almost completed GoLytely but has not been able to produce BM. Writing RN notified GI MD of this and discussed colonoscopy scheduled at 8 AM. MD orders to try 1-2 Mag Citrate; if still unsuccessful, give another 1/2 jug GoLytely prep. Needs to finish 4 hrs before procedure schedule time and be NPO. Orders placed. 3/25/2023 0000: 1st jug of golytely consumed (4000 ml). NATIONAL SHORTAGE OF MAG CITRATE; MED UNAVAILABLE. Pt started on 2nd go lytely order which is a 2000 ml GoLytely. Still no BM at this time time, bowel sounds very active. Pt reports he \"feels something\" and \"hopefully will have BM soon\". 3/25/2023 0340: In total patient has consumed 5000 ml golytely, produced three extra large, watery/liquid brown BMs. Not yet transparent. 3/25/2023 0400: 4th BM, XL, tan/brown/liquid. NPO, finished 2nd golytely order. 3/25/2023 0550: Pt 5th BM occurred at this time. This BM was tan/brown but more clear/transparent. Pt also voided 110 ml at this time, PVR 17 ml.

## 2023-03-25 NOTE — PLAN OF CARE
Pt Aox4, VSS. Tele continued; NSR/ST (HR 's). Voids, urine being collected for labs orders. Pt continued on Golytely. Pt on room air; denies LIZBETH/SOB/lightheadedness. Up with SB, steady gait. Pt denies nausea. Accuchecks continued. SCDs continued. 2 old incisions with stitches intact. Abdomen rounded. Bowel sounds present. Fall & safety precautions in place. Pain reported by pt in abdomen and back; managed w/ IV pain med per orders. POC discussed.      CARE PLAN:   Problem: Diabetes/Glucose Control  Goal: Glucose maintained within prescribed range  Description: INTERVENTIONS:  - Monitor Blood Glucose as ordered  - Assess for signs and symptoms of hyperglycemia and hypoglycemia  - Administer ordered medications to maintain glucose within target range  - Assess barriers to adequate nutritional intake and initiate nutrition consult as needed  - Instruct patient on self management of diabetes  3/25/2023 0336 by Sam Liu RN  Outcome: Progressing  3/25/2023 0336 by Sam Liu RN  Outcome: Progressing     Problem: GENITOURINARY  Goal: Maintain optimal urinary function  Description: Interventions:  - Assess ability to void  - Assess for bladder distention  - Monitor creatinine and BUN  - Monitor intake and output  - Insert urinary catheter as ordered  - Obtain appropriate imaging as ordered  3/25/2023 0336 by Sam Liu RN  Outcome: Progressing  3/25/2023 0336 by Sam Liu RN  Outcome: Progressing     Problem: Pre-existing medical condition  Goal: Maintenance of stable medical condition  Description: INTERVENTIONS:  - Administer medications and treatments as ordered  - Monitor for and notify internist of changes as needed      - Conduct history & physical assessment within 24 hours of admission    3/25/2023 0336 by Sam Liu RN  Outcome: Progressing  3/25/2023 0336 by Sam Liu RN  Outcome: Progressing     Problem: SAFETY ADULT - FALL  Goal: Free from fall injury  Description: INTERVENTIONS AS NEEDED:  - Assess patient for physical needs  - Identify cognitive and physical deficits and behaviors that affect risk of falls  - [place] fall precautions as indicated by assessment  - Educate patient/family on patient safety including physical imitations  - Instruct patient to call for assistance with activity based on assessment  - Modify environment to reduce risk of injury  - Provide assistive devices as appropriate  - Consider occupational and/or physical therapy consult to assist with strengthening/mobility  - Encourage toileting schedule    3/25/2023 0336 by Yrn Romo RN  Outcome: Progressing  3/25/2023 0336 by Yrn Romo RN  Outcome: Progressing     Problem: Safety - Medical Bed  Goal: Safety Maintained While Utilizing Medical Bed  Description: INTERVENTIONS AS NEEDED:  - Assess patient for physical needs  - Modify environment to reduce risk of injury  3/25/2023 0336 by Yrn Romo RN  Outcome: Progressing  3/25/2023 0336 by Yrn Romo RN  Outcome: Progressing     Problem: Risk for Infection  Goal: Absence of fever/infection during anticipated neutropenic period  Description: INTERVENTIONS:  - Monitor WBC  - Implement neutropenic guidelines  3/25/2023 0336 by Yrn Romo RN  Outcome: Progressing  3/25/2023 0336 by Yrn Romo RN  Outcome: Progressing     Problem: CARDIOVASCULAR - ADULT  Goal: Maintains optimal cardiac output and hemodynamic stability  Description: INTERVENTIONS:  - Monitor vital signs, rhythm, and trends  - Monitor for bleeding, hypotension and signs of decreased cardiac output  - Evaluate effectiveness of vasoactive medications to optimize hemodynamic stability  - Monitor arterial and/or venous puncture sites for bleeding and/or hematoma  - Assess quality of pulses, skin color and temperature  - Assess for signs of decreased coronary artery perfusion - ex.  Angina  - Evaluate fluid balance, assess for edema, trend weights  3/25/2023 0336 by Alin Stephen RN  Outcome: Progressing  3/25/2023 0336 by Alin Stephen RN  Outcome: Progressing  Goal: Absence of cardiac arrhythmias or at baseline  Description: INTERVENTIONS:  - Continuous cardiac monitoring, monitor vital signs, obtain 12 lead EKG if indicated  - Evaluate effectiveness of antiarrhythmic and heart rate control medications as ordered  - Initiate emergency measures for life threatening arrhythmias  - Monitor electrolytes and administer replacement therapy as ordered  3/25/2023 0336 by Alin Stephen RN  Outcome: Progressing  3/25/2023 0336 by Alin Stephen RN  Outcome: Progressing     Problem: GASTROINTESTINAL - ADULT  Goal: Minimal or absence of nausea and vomiting  Description: INTERVENTIONS:  - Maintain adequate hydration with IV or PO as ordered and tolerated  - Nasogastric tube to low intermittent suction as ordered  - Evaluate effectiveness of ordered antiemetic medications  - Provide nonpharmacologic comfort measures as appropriate  - Advance diet as tolerated, if ordered  - Obtain nutritional consult as needed  - Evaluate fluid balance  3/25/2023 0336 by Alin Stephen RN  Outcome: Progressing  3/25/2023 0336 by Alin Stephen RN  Outcome: Progressing  Goal: Maintains or returns to baseline bowel function  Description: INTERVENTIONS:  - Assess bowel function  - Maintain adequate hydration with IV or PO as ordered and tolerated  - Evaluate effectiveness of GI medications  - Encourage mobilization and activity  - Obtain nutritional consult as needed  - Establish a toileting routine/schedule  - Consider collaborating with pharmacy to review patient's medication profile  3/25/2023 0336 by Alin Stephen RN  Outcome: Progressing  3/25/2023 0336 by Alin Stephen RN  Outcome: Progressing  Goal: Maintains adequate nutritional intake (undernourished)  Description: INTERVENTIONS:  - Monitor percentage of each meal consumed  - Identify factors contributing to decreased intake, treat as appropriate  - Assist with meals as needed  - Monitor I&O, WT and lab values  - Obtain nutritional consult as needed  - Optimize oral hygiene and moisture  - Encourage food from home; allow for food preferences  - Enhance eating environment  3/25/2023 0336 by Tricia Smith RN  Outcome: Progressing  3/25/2023 0336 by Tricia Smith RN  Outcome: Progressing  Goal: Achieves appropriate nutritional intake (bariatric)  Description: INTERVENTIONS:  - Monitor for over-consumption  - Identify factors contributing to increased intake, treat as appropriate  - Monitor I&O, WT and lab values  - Obtain nutritional consult as needed  - Evaluate psychosocial factors contributing to over-consumption  3/25/2023 0336 by Tricia Smith RN  Outcome: Progressing  3/25/2023 0336 by Tricia Smith RN  Outcome: Progressing

## 2023-03-26 LAB
ALBUMIN SERPL-MCNC: 3 G/DL (ref 3.4–5)
ANION GAP SERPL CALC-SCNC: 7 MMOL/L (ref 0–18)
BUN BLD-MCNC: 25 MG/DL (ref 7–18)
CALCIUM BLD-MCNC: 8.7 MG/DL (ref 8.5–10.1)
CHLORIDE SERPL-SCNC: 108 MMOL/L (ref 98–112)
CO2 SERPL-SCNC: 28 MMOL/L (ref 21–32)
CREAT BLD-MCNC: 2.82 MG/DL
ERYTHROCYTE [DISTWIDTH] IN BLOOD BY AUTOMATED COUNT: 14.2 %
GFR SERPLBLD BASED ON 1.73 SQ M-ARVRAT: 27 ML/MIN/1.73M2 (ref 60–?)
GLUCOSE BLD-MCNC: 102 MG/DL (ref 70–99)
GLUCOSE BLD-MCNC: 107 MG/DL (ref 70–99)
GLUCOSE BLD-MCNC: 112 MG/DL (ref 70–99)
GLUCOSE BLD-MCNC: 218 MG/DL (ref 70–99)
GLUCOSE BLD-MCNC: 247 MG/DL (ref 70–99)
HCT VFR BLD AUTO: 31.5 %
HGB BLD-MCNC: 10.8 G/DL
MCH RBC QN AUTO: 30.8 PG (ref 26–34)
MCHC RBC AUTO-ENTMCNC: 34.3 G/DL (ref 31–37)
MCV RBC AUTO: 89.7 FL
OSMOLALITY SERPL CALC.SUM OF ELEC: 301 MOSM/KG (ref 275–295)
PHOSPHATE SERPL-MCNC: 2.9 MG/DL (ref 2.5–4.9)
PLATELET # BLD AUTO: 205 10(3)UL (ref 150–450)
POTASSIUM SERPL-SCNC: 3.1 MMOL/L (ref 3.5–5.1)
RBC # BLD AUTO: 3.51 X10(6)UL
SODIUM SERPL-SCNC: 143 MMOL/L (ref 136–145)
WBC # BLD AUTO: 5 X10(3) UL (ref 4–11)

## 2023-03-26 PROCEDURE — 82962 GLUCOSE BLOOD TEST: CPT

## 2023-03-26 PROCEDURE — 80069 RENAL FUNCTION PANEL: CPT | Performed by: STUDENT IN AN ORGANIZED HEALTH CARE EDUCATION/TRAINING PROGRAM

## 2023-03-26 PROCEDURE — 0DJD8ZZ INSPECTION OF LOWER INTESTINAL TRACT, VIA NATURAL OR ARTIFICIAL OPENING ENDOSCOPIC: ICD-10-PCS | Performed by: INTERNAL MEDICINE

## 2023-03-26 PROCEDURE — 85027 COMPLETE CBC AUTOMATED: CPT | Performed by: HOSPITALIST

## 2023-03-26 RX ORDER — POTASSIUM CHLORIDE 20 MEQ/1
40 TABLET, EXTENDED RELEASE ORAL ONCE
Status: COMPLETED | OUTPATIENT
Start: 2023-03-26 | End: 2023-03-26

## 2023-03-26 RX ORDER — NALOXONE HYDROCHLORIDE 0.4 MG/ML
80 INJECTION, SOLUTION INTRAMUSCULAR; INTRAVENOUS; SUBCUTANEOUS AS NEEDED
Status: DISCONTINUED | OUTPATIENT
Start: 2023-03-26 | End: 2023-03-26 | Stop reason: HOSPADM

## 2023-03-26 RX ORDER — SODIUM CHLORIDE, SODIUM LACTATE, POTASSIUM CHLORIDE, CALCIUM CHLORIDE 600; 310; 30; 20 MG/100ML; MG/100ML; MG/100ML; MG/100ML
INJECTION, SOLUTION INTRAVENOUS CONTINUOUS PRN
Status: DISCONTINUED | OUTPATIENT
Start: 2023-03-26 | End: 2023-03-26 | Stop reason: SURG

## 2023-03-26 RX ORDER — SODIUM CHLORIDE, SODIUM LACTATE, POTASSIUM CHLORIDE, CALCIUM CHLORIDE 600; 310; 30; 20 MG/100ML; MG/100ML; MG/100ML; MG/100ML
INJECTION, SOLUTION INTRAVENOUS CONTINUOUS
Status: DISCONTINUED | OUTPATIENT
Start: 2023-03-26 | End: 2023-03-26

## 2023-03-26 RX ORDER — LIDOCAINE HYDROCHLORIDE 10 MG/ML
INJECTION, SOLUTION EPIDURAL; INFILTRATION; INTRACAUDAL; PERINEURAL AS NEEDED
Status: DISCONTINUED | OUTPATIENT
Start: 2023-03-26 | End: 2023-03-26 | Stop reason: SURG

## 2023-03-26 RX ADMIN — LIDOCAINE HYDROCHLORIDE 30 MG: 10 INJECTION, SOLUTION EPIDURAL; INFILTRATION; INTRACAUDAL; PERINEURAL at 08:45:00

## 2023-03-26 RX ADMIN — SODIUM CHLORIDE, SODIUM LACTATE, POTASSIUM CHLORIDE, CALCIUM CHLORIDE: 600; 310; 30; 20 INJECTION, SOLUTION INTRAVENOUS at 08:41:00

## 2023-03-26 NOTE — ANESTHESIA POSTPROCEDURE EVALUATION
[address] Patient Status:  Inpatient   Age/Gender 38year old male MRN [id number]   Location [address] [facility] 28 Attending Shiv Dugan MD   [address] 3 PCP Esperanza Johnson MD       Anesthesia Post-op Note    COLONOSCOPY    Procedure Summary     Date: 03/26/23 Room / Location: [facility] ENDOSCOPY 02 / [facility] ENDOSCOPY    Anesthesia Start: Karey Herb Anesthesia Stop: 8054    Procedure: COLONOSCOPY Diagnosis: (Diverticulosis, polyps, hemorrhoids)    Surgeons: Ovidio Whitten MD Anesthesiologist: Fly Brizuela MD    Anesthesia Type: MAC ASA Status: 3          Anesthesia Type: MAC    Vitals Value Taken Time   /73 03/26/23 0926   Temp  03/26/23 0926   Pulse 85 03/26/23 0925   Resp 16 03/26/23 0926   SpO2 94 03/26/23 0925   Vitals shown include unvalidated device data. Patient Location: Endoscopy    Anesthesia Type: MAC    Airway Patency: patent    Postop Pain Control: adequate    Mental Status: mildly sedated but able to meaningfully participate in the post-anesthesia evaluation    Nausea/Vomiting: none    Cardiopulmonary/Hydration status: stable euvolemic    Complications: no apparent anesthesia related complications    Postop vital signs: stable    Dental Exam: Unchanged from Preop    Patient to be discharged from PACU when criteria met.

## 2023-03-26 NOTE — PLAN OF CARE
Pt returned from colonoscopy, GI signed off. Advanced diet to general (renal). IV SL. Voiding without difficulty. Denies pain at this time. Spoke with Dr. Brissa Andre regarding discharge planning. Plan to keep patient to monitor kidney function. Plan discussed with pt who verbalized understanding. All questions were answered.      Problem: Diabetes/Glucose Control  Goal: Glucose maintained within prescribed range  Description: INTERVENTIONS:  - Monitor Blood Glucose as ordered  - Assess for signs and symptoms of hyperglycemia and hypoglycemia  - Administer ordered medications to maintain glucose within target range  - Assess barriers to adequate nutritional intake and initiate nutrition consult as needed  - Instruct patient on self management of diabetes  Outcome: Progressing     Problem: GENITOURINARY  Goal: Maintain optimal urinary function  Description: Interventions:  - Assess ability to void  - Assess for bladder distention  - Monitor creatinine and BUN  - Monitor intake and output  - Insert urinary catheter as ordered  - Obtain appropriate imaging as ordered  Outcome: Progressing     Problem: Pre-existing medical condition  Goal: Maintenance of stable medical condition  Description: INTERVENTIONS:  - Administer medications and treatments as ordered  - Monitor for and notify internist of changes as needed  - Conduct history & physical assessment within 24 hours of admission  - Assess & evaluate for further medical treatment if indicated       Problem: SAFETY ADULT - FALL  Goal: Free from fall injury  Description: INTERVENTIONS AS NEEDED:  - Assess patient for physical needs  - Identify cognitive and physical deficits and behaviors that affect risk of falls  - [place] fall precautions as indicated by assessment  - Educate patient/family on patient safety including physical imitations  - Instruct patient to call for assistance with activity based on assessment  - Modify environment to reduce risk of injury  - Provide assistive devices as appropriate  - Consider occupational and/or physical therapy consult to assist with strengthening/mobility  - Encourage toileting schedule  - Evaluate and order additional treatment as indicated  Outcome: Progressing     Problem: Safety - Medical Bed  Goal: Safety Maintained While Utilizing Medical Bed  Description: INTERVENTIONS AS NEEDED:  - Assess patient for physical needs  - Modify environment to reduce risk of injury  Outcome: Progressing     Problem: Risk for Infection  Goal: Absence of fever/infection during anticipated neutropenic period  Description: INTERVENTIONS:  - Monitor WBC  - Implement neutropenic guidelines  Outcome: Progressing     Problem: CARDIOVASCULAR - ADULT  Goal: Maintains optimal cardiac output and hemodynamic stability  Description: INTERVENTIONS:  - Monitor vital signs, rhythm, and trends  - Monitor for bleeding, hypotension and signs of decreased cardiac output  - Evaluate effectiveness of vasoactive medications to optimize hemodynamic stability  - Monitor arterial and/or venous puncture sites for bleeding and/or hematoma  - Assess quality of pulses, skin color and temperature  - Assess for signs of decreased coronary artery perfusion - ex.  Angina  - Evaluate fluid balance, assess for edema, trend weights  Outcome: Progressing  Goal: Absence of cardiac arrhythmias or at baseline  Description: INTERVENTIONS:  - Continuous cardiac monitoring, monitor vital signs, obtain 12 lead EKG if indicated  - Evaluate effectiveness of antiarrhythmic and heart rate control medications as ordered  - Initiate emergency measures for life threatening arrhythmias  - Monitor electrolytes and administer replacement therapy as ordered  Outcome: Progressing     Problem: GASTROINTESTINAL - ADULT  Goal: Minimal or absence of nausea and vomiting  Description: INTERVENTIONS:  - Maintain adequate hydration with IV or PO as ordered and tolerated  - Nasogastric tube to low intermittent suction as ordered  - Evaluate effectiveness of ordered antiemetic medications  - Provide nonpharmacologic comfort measures as appropriate  - Advance diet as tolerated, if ordered  - Obtain nutritional consult as needed  - Evaluate fluid balance  Outcome: Progressing  Goal: Maintains or returns to baseline bowel function  Description: INTERVENTIONS:  - Assess bowel function  - Maintain adequate hydration with IV or PO as ordered and tolerated  - Evaluate effectiveness of GI medications  - Encourage mobilization and activity  - Obtain nutritional consult as needed  - Establish a toileting routine/schedule  - Consider collaborating with pharmacy to review patient's medication profile  Outcome: Progressing  Goal: Maintains adequate nutritional intake (undernourished)  Description: INTERVENTIONS:  - Monitor percentage of each meal consumed  - Identify factors contributing to decreased intake, treat as appropriate  - Assist with meals as needed  - Monitor I&O, WT and lab values  - Obtain nutritional consult as needed  - Optimize oral hygiene and moisture  - Encourage food from home; allow for food preferences  - Enhance eating environment  Outcome: Progressing  Goal: Achieves appropriate nutritional intake (bariatric)  Description: INTERVENTIONS:  - Monitor for over-consumption  - Identify factors contributing to increased intake, treat as appropriate  - Monitor I&O, WT and lab values  - Obtain nutritional consult as needed  - Evaluate psychosocial factors contributing to over-consumption  Outcome: Progressing

## 2023-03-26 NOTE — PLAN OF CARE
A/ox4. Tolerating RA. VSS. . Tele-NSR. Nonlabored respirations. Abdomen soft, tender. Pain being managed per PRN meds in MAR. Up ad osvaldo. Accuchecks TID. Scheduled colonoscopy 3/26/23. 24 hr urine pending. Npo. Left PIV-CDI and saline locked. GI notified of hazy brown/wilkinson stool and cleared for 3/26 procedure. Safety precautions in place. Patient updated on plan of care. Pt has no current questions or complaints.    Problem: Diabetes/Glucose Control  Goal: Glucose maintained within prescribed range  Description: INTERVENTIONS:  - Monitor Blood Glucose as ordered  - Assess for signs and symptoms of hyperglycemia and hypoglycemia  - Administer ordered medications to maintain glucose within target range  - Assess barriers to adequate nutritional intake and initiate nutrition consult as needed  - Instruct patient on self management of diabetes  Outcome: Progressing     Problem: GENITOURINARY  Goal: Maintain optimal urinary function  Description: Interventions:  - Assess ability to void  - Assess for bladder distention  - Monitor creatinine and BUN  - Monitor intake and output  - Insert urinary catheter as ordered  - Obtain appropriate imaging as ordered  Outcome: Progressing     Problem: Pre-existing medical condition  Goal: Maintenance of stable medical condition  Description: INTERVENTIONS:  - Administer medications and treatments as ordered  - Monitor for and notify internist of changes as needed  - Assess vital signs per unit routine and as needed      - Conduct history & physical assessment within 24 hours of admission  - Assess & evaluate for further medical treatment if indicated    Outcome: Progressing     Problem: SAFETY ADULT - FALL  Goal: Free from fall injury  Description: INTERVENTIONS AS NEEDED:  - Assess patient for physical needs  - Identify cognitive and physical deficits and behaviors that affect risk of falls  - [place] fall precautions as indicated by assessment  - Educate patient/family on patient safety including physical imitations  - Instruct patient to call for assistance with activity based on assessment  - Modify environment to reduce risk of injury  - Provide assistive devices as appropriate  - Consider occupational and/or physical therapy consult to assist with strengthening/mobility  - Encourage toileting schedule      - Evaluate and order additional treatment as indicated    Outcome: Progressing     Problem: Safety - Medical Bed  Goal: Safety Maintained While Utilizing Medical Bed  Description: INTERVENTIONS AS NEEDED:  - Assess patient for physical needs  - Modify environment to reduce risk of injury      - Evaluate and order additional treatment as indicated    Outcome: Progressing     Problem: Risk for Infection  Goal: Absence of fever/infection during anticipated neutropenic period  Description: INTERVENTIONS:  - Monitor WBC  - Implement neutropenic guidelines  Outcome: Progressing     Problem: CARDIOVASCULAR - ADULT  Goal: Maintains optimal cardiac output and hemodynamic stability  Description: INTERVENTIONS:  - Monitor vital signs, rhythm, and trends  - Monitor for bleeding, hypotension and signs of decreased cardiac output  - Evaluate effectiveness of vasoactive medications to optimize hemodynamic stability  - Monitor arterial and/or venous puncture sites for bleeding and/or hematoma  - Assess quality of pulses, skin color and temperature  - Assess for signs of decreased coronary artery perfusion - ex.  Angina  - Evaluate fluid balance, assess for edema, trend weights  Outcome: Progressing  Goal: Absence of cardiac arrhythmias or at baseline  Description: INTERVENTIONS:  - Continuous cardiac monitoring, monitor vital signs, obtain 12 lead EKG if indicated  - Evaluate effectiveness of antiarrhythmic and heart rate control medications as ordered  - Initiate emergency measures for life threatening arrhythmias  - Monitor electrolytes and administer replacement therapy as ordered  Outcome: Progressing     Problem: GASTROINTESTINAL - ADULT  Goal: Minimal or absence of nausea and vomiting  Description: INTERVENTIONS:  - Maintain adequate hydration with IV or PO as ordered and tolerated  - Nasogastric tube to low intermittent suction as ordered  - Evaluate effectiveness of ordered antiemetic medications  - Provide nonpharmacologic comfort measures as appropriate  - Advance diet as tolerated, if ordered  - Obtain nutritional consult as needed  - Evaluate fluid balance  Outcome: Progressing  Goal: Maintains or returns to baseline bowel function  Description: INTERVENTIONS:  - Assess bowel function  - Maintain adequate hydration with IV or PO as ordered and tolerated  - Evaluate effectiveness of GI medications  - Encourage mobilization and activity  - Obtain nutritional consult as needed  - Establish a toileting routine/schedule  - Consider collaborating with pharmacy to review patient's medication profile  Outcome: Progressing  Goal: Maintains adequate nutritional intake (undernourished)  Description: INTERVENTIONS:  - Monitor percentage of each meal consumed  - Identify factors contributing to decreased intake, treat as appropriate  - Assist with meals as needed  - Monitor I&O, WT and lab values  - Obtain nutritional consult as needed  - Optimize oral hygiene and moisture  - Encourage food from home; allow for food preferences  - Enhance eating environment  Outcome: Progressing  Goal: Achieves appropriate nutritional intake (bariatric)  Description: INTERVENTIONS:  - Monitor for over-consumption  - Identify factors contributing to increased intake, treat as appropriate  - Monitor I&O, WT and lab values  - Obtain nutritional consult as needed  - Evaluate psychosocial factors contributing to over-consumption  Outcome: Progressing

## 2023-03-26 NOTE — OPERATIVE REPORT
Colonoscopy Operative Report    Tulio Sorto Patient Status:  Inpatient    1980 MRN [id number]   Location [id number] [place] [address] Attending Gayle Tesfaye MD   Hosp Day #   3 PCP Santa Staley MD     Pre-Operative Diagnosis: LLQ abdominal pain    Post-Operative Diagnosis:  - Significant left-sided extrinsic fixation + marked pan-colonic tortuosity, likely due to diverticular burden + prior abdominal surgery/interventions. - Medium-sized non-bleeding internal hemorrhoids.  - Multiple scattered small and large diverticulosis in the sigmoid and descending colon. - At least two 5mm sessile polyps in the cecum and transverse colon noted on this exam. These were not resected in setting of ongoing anticoagulation.  - Colon otherwise normal in setting of fair bowel preparation + marked colonic tortuosity. No large lesions noted, no overt structural/inflammatory findings that would contribute to LLQ pain. Procedure Performed: COLONOSCOPY       Informed Consent: Informed consent for both the procedure and sedation were obtained from the patient. The potentially life-threatening complications of sedation, bleeding,  perforation, transfusion or repeat endoscopy  were reviewed along with the possible need for hospitalization, surgical management, transfusion or repeat endoscopy should one of these complications arise. The patient understands and is agreeable to proceed. Sedation Type: MAC. Patient received sedation with monitored anesthesia provided by an Anesthesiologist.  Moderate Sedation Time: None. Deep sedation provided by Anesthesia. Cecum Withdrawal Time:  9 minutes    Date of previous colonoscopy: n/a    Procedure Description: The patient was placed in the left lateral decubitus position.   After careful digital rectal examination, the Pediatric colonoscope was inserted into the rectum and advanced to the level of the cecum under direct visualization. The cecum was identified by landmarks, including the appendiceal orifice and ileoceccal valve. Careful examination of the entire colon was performed during withdrawal of the endoscope. The scope was withdrawn to the rectum and retroflexion was performed. The patient tolerated the procedure well with no immediate complications. The patient was transferred to the recovery area in stable condition. Quality of Preparation: Inadequate    Aronchick Bowel Prep Scale:  3    Complications:  No immediate complications noted. COL Findings:  - Significant left-sided extrinsic fixation + marked pan-colonic tortuosity, likely due to diverticular burden + prior abdominal surgery/interventions. - Medium-sized non-bleeding internal hemorrhoids.  - Multiple scattered small and large diverticulosis in the sigmoid and descending colon. - At least two 5mm sessile polyps in the cecum and transverse colon noted on this exam. These were not resected in setting of ongoing anticoagulation.  - Colon otherwise normal in setting of fair bowel preparation + marked colonic tortuosity. No large lesions noted, no overt structural/inflammatory findings that would contribute to LLQ pain. Recommendations:  - Return to hospital hook for further care. - Will require repeat MAC COL within the next 3-6 months to address the polyps noted on today's exam, once able to come off anticoagulation safely for the procedure. - Would strongly advise MAC sedation + 2-day bowel preparation prior to any future COL procedures. - Advise daily fiber supplementation given diverticular disease + hemorrhoids noted on exam today. - GI will sign off for now, call if questions/issues arise.  - Findings were discussed with the patient and requested family/acquaintance today following procedure. Discharge: The patient was given an after visit summary detailing the procedure, findings, recommendations and follow up plans.      Chanell Bains Nirav Bee MD  3/26/2023  9:23 AM

## 2023-03-27 VITALS
HEART RATE: 69 BPM | RESPIRATION RATE: 18 BRPM | TEMPERATURE: 98 F | OXYGEN SATURATION: 98 % | WEIGHT: 255.88 LBS | BODY MASS INDEX: 33.19 KG/M2 | HEIGHT: 73.5 IN | SYSTOLIC BLOOD PRESSURE: 124 MMHG | DIASTOLIC BLOOD PRESSURE: 78 MMHG

## 2023-03-27 LAB
ALBUMIN SERPL-MCNC: 3.1 G/DL (ref 3.4–5)
ANION GAP SERPL CALC-SCNC: 5 MMOL/L (ref 0–18)
BUN BLD-MCNC: 27 MG/DL (ref 7–18)
CALCIUM BLD-MCNC: 8.7 MG/DL (ref 8.5–10.1)
CHLORIDE SERPL-SCNC: 112 MMOL/L (ref 98–112)
CO2 SERPL-SCNC: 25 MMOL/L (ref 21–32)
CREAT BLD-MCNC: 3.01 MG/DL
ERYTHROCYTE [DISTWIDTH] IN BLOOD BY AUTOMATED COUNT: 14 %
GFR SERPLBLD BASED ON 1.73 SQ M-ARVRAT: 25 ML/MIN/1.73M2 (ref 60–?)
GLUCOSE BLD-MCNC: 120 MG/DL (ref 70–99)
GLUCOSE BLD-MCNC: 124 MG/DL (ref 70–99)
GLUCOSE BLD-MCNC: 149 MG/DL (ref 70–99)
HCT VFR BLD AUTO: 30.8 %
HGB BLD-MCNC: 10.6 G/DL
MCH RBC QN AUTO: 30.8 PG (ref 26–34)
MCHC RBC AUTO-ENTMCNC: 34.4 G/DL (ref 31–37)
MCV RBC AUTO: 89.5 FL
OSMOLALITY SERPL CALC.SUM OF ELEC: 300 MOSM/KG (ref 275–295)
PHOSPHATE SERPL-MCNC: 3.4 MG/DL (ref 2.5–4.9)
PLATELET # BLD AUTO: 201 10(3)UL (ref 150–450)
POTASSIUM SERPL-SCNC: 3.5 MMOL/L (ref 3.5–5.1)
RBC # BLD AUTO: 3.44 X10(6)UL
SODIUM SERPL-SCNC: 142 MMOL/L (ref 136–145)
WBC # BLD AUTO: 5.5 X10(3) UL (ref 4–11)

## 2023-03-27 PROCEDURE — 80069 RENAL FUNCTION PANEL: CPT | Performed by: STUDENT IN AN ORGANIZED HEALTH CARE EDUCATION/TRAINING PROGRAM

## 2023-03-27 PROCEDURE — 36415 COLL VENOUS BLD VENIPUNCTURE: CPT | Performed by: STUDENT IN AN ORGANIZED HEALTH CARE EDUCATION/TRAINING PROGRAM

## 2023-03-27 PROCEDURE — 82962 GLUCOSE BLOOD TEST: CPT

## 2023-03-27 PROCEDURE — 82610 CYSTATIN C: CPT | Performed by: STUDENT IN AN ORGANIZED HEALTH CARE EDUCATION/TRAINING PROGRAM

## 2023-03-27 PROCEDURE — 85027 COMPLETE CBC AUTOMATED: CPT | Performed by: HOSPITALIST

## 2023-03-27 RX ORDER — TRAMADOL HYDROCHLORIDE 50 MG/1
50 TABLET ORAL EVERY 12 HOURS PRN
Qty: 15 TABLET | Refills: 0 | Status: ON HOLD | OUTPATIENT
Start: 2023-03-27 | End: 2023-05-23

## 2023-03-27 NOTE — PROGRESS NOTES
Spoke with Nephrology and hospitalist, plan to discharge home today. Pt is to follow up with his nephrologist and surgeon. Pt verbalized understanding.  Urology follow up information included in AVS.

## 2023-03-27 NOTE — PLAN OF CARE
A/ox4. Tolerating RA. VSS. . Tele-NSR. Nonlabored respirations. Abdomen soft, tender. Denies any gas. Denies nausea. Pain being managed per PRN meds in MAR. Up ad osvaldo. Accuchecks TID. Tolerating diet well. Left PIV-CDI and saline locked. Safety precautions in place. Patient updated on plan of care. Pt has no current questions or complaints.    Problem: Diabetes/Glucose Control  Goal: Glucose maintained within prescribed range  Description: INTERVENTIONS:  - Monitor Blood Glucose as ordered  - Assess for signs and symptoms of hyperglycemia and hypoglycemia  - Administer ordered medications to maintain glucose within target range  - Assess barriers to adequate nutritional intake and initiate nutrition consult as needed  - Instruct patient on self management of diabetes  Outcome: Progressing     Problem: GENITOURINARY  Goal: Maintain optimal urinary function  Description: Interventions:  - Assess ability to void  - Assess for bladder distention  - Monitor creatinine and BUN  - Monitor intake and output  - Insert urinary catheter as ordered  - Obtain appropriate imaging as ordered  Outcome: Progressing     Problem: Pre-existing medical condition  Goal: Maintenance of stable medical condition  Description: INTERVENTIONS:  - Administer medications and treatments as ordered  - Monitor for and notify internist of changes as needed  - Assess vital signs per unit routine    - Conduct history & physical assessment within 24 hours of admission  - Assess & evaluate for further medical treatment if indicated    Outcome: Progressing     Problem: SAFETY ADULT - FALL  Goal: Free from fall injury  Description: INTERVENTIONS AS NEEDED:  - Assess patient for physical needs  - Identify cognitive and physical deficits and behaviors that affect risk of falls  - [place] fall precautions as indicated by assessment  - Educate patient/family on patient safety including physical imitations  - Instruct patient to call for assistance with activity based on assessment  - Modify environment to reduce risk of injury  - Provide assistive devices as appropriate  - Consider occupational and/or physical therapy consult to assist with strengthening/mobility  - Encourage toileting schedule      - Evaluate and order additional treatment as indicated    Outcome: Progressing     Problem: Safety - Medical Bed  Goal: Safety Maintained While Utilizing Medical Bed  Description: INTERVENTIONS AS NEEDED:  - Assess patient for physical needs  - Modify environment to reduce risk of injury      - Evaluate and order additional treatment as indicated    Outcome: Progressing     Problem: Risk for Infection  Goal: Absence of fever/infection during anticipated neutropenic period  Description: INTERVENTIONS:  - Monitor WBC  - Implement neutropenic guidelines  Outcome: Progressing     Problem: CARDIOVASCULAR - ADULT  Goal: Maintains optimal cardiac output and hemodynamic stability  Description: INTERVENTIONS:  - Monitor vital signs, rhythm, and trends  - Monitor for bleeding, hypotension and signs of decreased cardiac output  - Evaluate effectiveness of vasoactive medications to optimize hemodynamic stability  - Monitor arterial and/or venous puncture sites for bleeding and/or hematoma  - Assess quality of pulses, skin color and temperature  - Assess for signs of decreased coronary artery perfusion - ex.  Angina  - Evaluate fluid balance, assess for edema, trend weights  Outcome: Progressing  Goal: Absence of cardiac arrhythmias or at baseline  Description: INTERVENTIONS:  - Continuous cardiac monitoring, monitor vital signs, obtain 12 lead EKG if indicated  - Evaluate effectiveness of antiarrhythmic and heart rate control medications as ordered  - Initiate emergency measures for life threatening arrhythmias  - Monitor electrolytes and administer replacement therapy as ordered  Outcome: Progressing     Problem: GASTROINTESTINAL - ADULT  Goal: Minimal or absence of nausea and vomiting  Description: INTERVENTIONS:  - Maintain adequate hydration with IV or PO as ordered and tolerated  - Nasogastric tube to low intermittent suction as ordered  - Evaluate effectiveness of ordered antiemetic medications  - Provide nonpharmacologic comfort measures as appropriate  - Advance diet as tolerated, if ordered  - Obtain nutritional consult as needed  - Evaluate fluid balance  Outcome: Progressing  Goal: Maintains or returns to baseline bowel function  Description: INTERVENTIONS:  - Assess bowel function  - Maintain adequate hydration with IV or PO as ordered and tolerated  - Evaluate effectiveness of GI medications  - Encourage mobilization and activity  - Obtain nutritional consult as needed  - Establish a toileting routine/schedule  - Consider collaborating with pharmacy to review patient's medication profile  Outcome: Progressing  Goal: Maintains adequate nutritional intake (undernourished)  Description: INTERVENTIONS:  - Monitor percentage of each meal consumed  - Identify factors contributing to decreased intake, treat as appropriate  - Assist with meals as needed  - Monitor I&O, WT and lab values  - Obtain nutritional consult as needed  - Optimize oral hygiene and moisture  - Encourage food from home; allow for food preferences  - Enhance eating environment  Outcome: Progressing  Goal: Achieves appropriate nutritional intake (bariatric)  Description: INTERVENTIONS:  - Monitor for over-consumption  - Identify factors contributing to increased intake, treat as appropriate  - Monitor I&O, WT and lab values  - Obtain nutritional consult as needed  - Evaluate psychosocial factors contributing to over-consumption  Outcome: Progressing

## 2023-03-27 NOTE — PLAN OF CARE
Patient resting comfortably in bed. Alert and oriented X 4. VSS, on RA, TELE/ NSR.  in place. Non labored respirations. Lungs are clear bilaterally. Abdomen is soft and tender. Pain is being managed per scheduled pain medications per MAR. Up ad osvaldo. QID accuchecks. Safety precautions in place. Call light within reach.    Problem: Diabetes/Glucose Control  Goal: Glucose maintained within prescribed range  Description: INTERVENTIONS:  - Monitor Blood Glucose as ordered  - Assess for signs and symptoms of hyperglycemia and hypoglycemia  - Administer ordered medications to maintain glucose within target range  - Assess barriers to adequate nutritional intake and initiate nutrition consult as needed  - Instruct patient on self management of diabetes  Outcome: Progressing    Problem: GENITOURINARY  Goal: Maintain optimal urinary function  Description: Interventions:  - Assess ability to void  - Assess for bladder distention  - Monitor creatinine and BUN  - Monitor intake and output  - Insert urinary catheter as ordered  - Obtain appropriate imaging as ordered  Outcome: Progressing     Problem: Pre-existing medical condition  Goal: Maintenance of stable medical condition  Description: INTERVENTIONS:  - Administer medications and treatments as ordered  - Monitor for and notify internist of changes as needed  - Assess vital signs per unit routine  and as needed  - Responsible Professional: , RN    - Conduct history & physical assessment within 24 hours of admission  - Assess & evaluate for further medical treatment if indicated  - Responsible Professional: , MD  Outcome: Progressing     Problem: SAFETY ADULT - FALL  Goal: Free from fall injury  Description: INTERVENTIONS AS NEEDED:  - Assess patient for physical needs  - Identify cognitive and physical deficits and behaviors that affect risk of falls  - [place] fall precautions as indicated by assessment  - Educate patient/family on patient safety including physical imitations  - Instruct patient to call for assistance with activity based on assessment  - Modify environment to reduce risk of injury  - Provide assistive devices as appropriate  - Consider occupational and/or physical therapy consult to assist with strengthening/mobility  - Encourage toileting schedule  - Responsible Professional:  RN    - Evaluate and order additional treatment as indicated  - Responsible Professional:  MD  Outcome: Progressing     Problem: Safety - Medical Bed  Goal: Safety Maintained While Utilizing Medical Bed  Description: INTERVENTIONS AS NEEDED:  - Assess patient for physical needs  - Modify environment to reduce risk of injury  - Responsible Professional:  RN    - Evaluate and order additional treatment as indicated  - Responsible Professional: MD  Outcome: Progressing     Problem: CARDIOVASCULAR - ADULT  Goal: Maintains optimal cardiac output and hemodynamic stability  Description: INTERVENTIONS:  - Monitor vital signs, rhythm, and trends  - Monitor for bleeding, hypotension and signs of decreased cardiac output  - Evaluate effectiveness of vasoactive medications to optimize hemodynamic stability  - Monitor arterial and/or venous puncture sites for bleeding and/or hematoma  - Assess quality of pulses, skin color and temperature  - Assess for signs of decreased coronary artery perfusion - ex.  Angina  - Evaluate fluid balance, assess for edema, trend weights  Outcome: Progressing  Goal: Absence of cardiac arrhythmias or at baseline  Description: INTERVENTIONS:  - Continuous cardiac monitoring, monitor vital signs, obtain 12 lead EKG if indicated  - Evaluate effectiveness of antiarrhythmic and heart rate control medications as ordered  - Initiate emergency measures for life threatening arrhythmias  - Monitor electrolytes and administer replacement therapy as ordered  Outcome: Progressing     Problem: Risk for Infection  Goal: Absence of fever/infection during anticipated neutropenic period  Description: INTERVENTIONS:  - Monitor WBC  - Implement neutropenic guidelines  Outcome: Progressing     Problem: GASTROINTESTINAL - ADULT  Goal: Minimal or absence of nausea and vomiting  Description: INTERVENTIONS:  - Maintain adequate hydration with IV or PO as ordered and tolerated  - Nasogastric tube to low intermittent suction as ordered  - Evaluate effectiveness of ordered antiemetic medications  - Provide nonpharmacologic comfort measures as appropriate  - Advance diet as tolerated, if ordered  - Obtain nutritional consult as needed  - Evaluate fluid balance  Outcome: Progressing  Goal: Maintains or returns to baseline bowel function  Description: INTERVENTIONS:  - Assess bowel function  - Maintain adequate hydration with IV or PO as ordered and tolerated  - Evaluate effectiveness of GI medications  - Encourage mobilization and activity  - Obtain nutritional consult as needed  - Establish a toileting routine/schedule  - Consider collaborating with pharmacy to review patient's medication profile  Outcome: Progressing  Goal: Maintains adequate nutritional intake (undernourished)  Description: INTERVENTIONS:  - Monitor percentage of each meal consumed  - Identify factors contributing to decreased intake, treat as appropriate  - Assist with meals as needed  - Monitor I&O, WT and lab values  - Obtain nutritional consult as needed  - Optimize oral hygiene and moisture  - Encourage food from home; allow for food preferences  - Enhance eating environment  Outcome: Progressing  Goal: Achieves appropriate nutritional intake (bariatric)  Description: INTERVENTIONS:  - Monitor for over-consumption  - Identify factors contributing to increased intake, treat as appropriate  - Monitor I&O, WT and lab values  - Obtain nutritional consult as needed  - Evaluate psychosocial factors contributing to over-consumption  Outcome: Progressing

## 2023-03-28 LAB
CYSTATIN C, SERUM: 2.2 MG/L
EGFR BY CYSTATIN C: 29 ML/MIN/BSA

## 2023-03-28 NOTE — CM/SW NOTE
Call received from [facility] [place]. Pt is current with their services. Order placed for MODESTA. Clinicals sent via fax to company.  [phone], Fax [phone].     Nicole Deshpande, BSN, VIA [facility]    [id number]

## 2023-04-05 ENCOUNTER — HOSPITAL ENCOUNTER (INPATIENT)
Facility: HOSPITAL | Age: 43
LOS: 4 days | Discharge: HOME | End: 2023-04-09
Attending: EMERGENCY MEDICINE | Admitting: HOSPITALIST
Payer: MEDICAID

## 2023-04-05 ENCOUNTER — APPOINTMENT (OUTPATIENT)
Dept: CT IMAGING | Age: 43
End: 2023-04-05
Attending: EMERGENCY MEDICINE
Payer: MEDICAID

## 2023-04-05 ENCOUNTER — APPOINTMENT (OUTPATIENT)
Dept: GENERAL RADIOLOGY | Age: 43
End: 2023-04-05
Attending: EMERGENCY MEDICINE
Payer: MEDICAID

## 2023-04-05 DIAGNOSIS — N18.9 ACUTE ON CHRONIC RENAL INSUFFICIENCY: ICD-10-CM

## 2023-04-05 DIAGNOSIS — I16.1 HYPERTENSIVE EMERGENCY: Primary | ICD-10-CM

## 2023-04-05 DIAGNOSIS — G89.29 CHRONIC ABDOMINAL PAIN: ICD-10-CM

## 2023-04-05 DIAGNOSIS — N28.9 ACUTE ON CHRONIC RENAL INSUFFICIENCY: ICD-10-CM

## 2023-04-05 DIAGNOSIS — R07.9 ACUTE CHEST PAIN: ICD-10-CM

## 2023-04-05 DIAGNOSIS — R77.8 ELEVATED TROPONIN: ICD-10-CM

## 2023-04-05 DIAGNOSIS — R10.9 CHRONIC ABDOMINAL PAIN: ICD-10-CM

## 2023-04-05 LAB
ALBUMIN SERPL-MCNC: 3.1 G/DL (ref 3.4–5)
ALBUMIN/GLOB SERPL: 0.8 {RATIO} (ref 1–2)
ALP LIVER SERPL-CCNC: 101 U/L
ALT SERPL-CCNC: 38 U/L
ANION GAP SERPL CALC-SCNC: 8 MMOL/L (ref 0–18)
APTT PPP: 26.5 SECONDS (ref 23.3–35.6)
APTT PPP: 31.8 SECONDS (ref 23.3–35.6)
AST SERPL-CCNC: 107 U/L (ref 15–37)
BASOPHILS # BLD AUTO: 0.09 X10(3) UL (ref 0–0.2)
BASOPHILS NFR BLD AUTO: 1 %
BILIRUB SERPL-MCNC: 0.7 MG/DL (ref 0.1–2)
BILIRUB UR QL STRIP.AUTO: NEGATIVE
BUN BLD-MCNC: 39 MG/DL (ref 7–18)
CALCIUM BLD-MCNC: 8.6 MG/DL (ref 8.5–10.1)
CHLORIDE SERPL-SCNC: 108 MMOL/L (ref 98–112)
CHOLEST SERPL-MCNC: 185 MG/DL (ref ?–200)
CO2 SERPL-SCNC: 21 MMOL/L (ref 21–32)
COLOR UR AUTO: YELLOW
CREAT BLD-MCNC: 3.83 MG/DL
D DIMER PPP FEU-MCNC: 0.78 UG/ML FEU (ref ?–0.5)
EOSINOPHIL # BLD AUTO: 0.32 X10(3) UL (ref 0–0.7)
EOSINOPHIL NFR BLD AUTO: 3.7 %
ERYTHROCYTE [DISTWIDTH] IN BLOOD BY AUTOMATED COUNT: 14.8 %
GFR SERPLBLD BASED ON 1.73 SQ M-ARVRAT: 19 ML/MIN/1.73M2 (ref 60–?)
GLOBULIN PLAS-MCNC: 4 G/DL (ref 2.8–4.4)
GLUCOSE BLD-MCNC: 155 MG/DL (ref 70–99)
GLUCOSE BLD-MCNC: 302 MG/DL (ref 70–99)
GLUCOSE UR STRIP.AUTO-MCNC: 150 MG/DL
HCT VFR BLD AUTO: 31.6 %
HDLC SERPL-MCNC: 48 MG/DL (ref 40–59)
HGB BLD-MCNC: 10.9 G/DL
IMM GRANULOCYTES # BLD AUTO: 0.03 X10(3) UL (ref 0–1)
IMM GRANULOCYTES NFR BLD: 0.3 %
INR BLD: 0.91 (ref 0.85–1.16)
KETONES UR STRIP.AUTO-MCNC: NEGATIVE MG/DL
LDLC SERPL CALC-MCNC: 89 MG/DL (ref ?–100)
LEUKOCYTE ESTERASE UR QL STRIP.AUTO: NEGATIVE
LIPASE SERPL-CCNC: 85 U/L (ref 13–75)
LYMPHOCYTES # BLD AUTO: 1.53 X10(3) UL (ref 1–4)
LYMPHOCYTES NFR BLD AUTO: 17.5 %
MCH RBC QN AUTO: 31.7 PG (ref 26–34)
MCHC RBC AUTO-ENTMCNC: 34.5 G/DL (ref 31–37)
MCV RBC AUTO: 91.9 FL
MONOCYTES # BLD AUTO: 0.77 X10(3) UL (ref 0.1–1)
MONOCYTES NFR BLD AUTO: 8.8 %
NEUTROPHILS # BLD AUTO: 5.99 X10 (3) UL (ref 1.5–7.7)
NEUTROPHILS # BLD AUTO: 5.99 X10(3) UL (ref 1.5–7.7)
NEUTROPHILS NFR BLD AUTO: 68.7 %
NITRITE UR QL STRIP.AUTO: NEGATIVE
NONHDLC SERPL-MCNC: 137 MG/DL (ref ?–130)
NT-PROBNP SERPL-MCNC: ABNORMAL PG/ML (ref ?–125)
OSMOLALITY SERPL CALC.SUM OF ELEC: 305 MOSM/KG (ref 275–295)
PH UR STRIP.AUTO: 8 [PH] (ref 5–8)
PLATELET # BLD AUTO: 262 10(3)UL (ref 150–450)
POTASSIUM SERPL-SCNC: 4.4 MMOL/L (ref 3.5–5.1)
PROT SERPL-MCNC: 7.1 G/DL (ref 6.4–8.2)
PROT UR STRIP.AUTO-MCNC: >=500 MG/DL
PROTHROMBIN TIME: 12.3 SECONDS (ref 11.6–14.8)
RBC # BLD AUTO: 3.44 X10(6)UL
SARS-COV-2 RNA RESP QL NAA+PROBE: NOT DETECTED
SODIUM SERPL-SCNC: 137 MMOL/L (ref 136–145)
SP GR UR STRIP.AUTO: 1.01 (ref 1–1.03)
TRIGL SERPL-MCNC: 293 MG/DL (ref 30–149)
TROPONIN I HIGH SENSITIVITY: 628 NG/L
TROPONIN I HIGH SENSITIVITY: 656 NG/L
TROPONIN I HIGH SENSITIVITY: 716 NG/L
UROBILINOGEN UR STRIP.AUTO-MCNC: <2 MG/DL
VLDLC SERPL CALC-MCNC: 48 MG/DL (ref 0–30)
WBC # BLD AUTO: 8.7 X10(3) UL (ref 4–11)

## 2023-04-05 PROCEDURE — 84484 ASSAY OF TROPONIN QUANT: CPT | Performed by: INTERNAL MEDICINE

## 2023-04-05 PROCEDURE — 85025 COMPLETE CBC W/AUTO DIFF WBC: CPT | Performed by: EMERGENCY MEDICINE

## 2023-04-05 PROCEDURE — 81001 URINALYSIS AUTO W/SCOPE: CPT | Performed by: HOSPITALIST

## 2023-04-05 PROCEDURE — 83880 ASSAY OF NATRIURETIC PEPTIDE: CPT | Performed by: EMERGENCY MEDICINE

## 2023-04-05 PROCEDURE — 82962 GLUCOSE BLOOD TEST: CPT

## 2023-04-05 PROCEDURE — 96376 TX/PRO/DX INJ SAME DRUG ADON: CPT

## 2023-04-05 PROCEDURE — 96365 THER/PROPH/DIAG IV INF INIT: CPT

## 2023-04-05 PROCEDURE — 80061 LIPID PANEL: CPT | Performed by: EMERGENCY MEDICINE

## 2023-04-05 PROCEDURE — 74176 CT ABD & PELVIS W/O CONTRAST: CPT | Performed by: EMERGENCY MEDICINE

## 2023-04-05 PROCEDURE — 71045 X-RAY EXAM CHEST 1 VIEW: CPT | Performed by: EMERGENCY MEDICINE

## 2023-04-05 PROCEDURE — 85610 PROTHROMBIN TIME: CPT | Performed by: EMERGENCY MEDICINE

## 2023-04-05 PROCEDURE — 80053 COMPREHEN METABOLIC PANEL: CPT | Performed by: EMERGENCY MEDICINE

## 2023-04-05 PROCEDURE — 85730 THROMBOPLASTIN TIME PARTIAL: CPT | Performed by: INTERNAL MEDICINE

## 2023-04-05 PROCEDURE — 83690 ASSAY OF LIPASE: CPT | Performed by: EMERGENCY MEDICINE

## 2023-04-05 PROCEDURE — 99285 EMERGENCY DEPT VISIT HI MDM: CPT

## 2023-04-05 PROCEDURE — 85379 FIBRIN DEGRADATION QUANT: CPT | Performed by: EMERGENCY MEDICINE

## 2023-04-05 PROCEDURE — 93010 ELECTROCARDIOGRAM REPORT: CPT

## 2023-04-05 PROCEDURE — 96375 TX/PRO/DX INJ NEW DRUG ADDON: CPT

## 2023-04-05 PROCEDURE — 84484 ASSAY OF TROPONIN QUANT: CPT | Performed by: EMERGENCY MEDICINE

## 2023-04-05 PROCEDURE — 93005 ELECTROCARDIOGRAM TRACING: CPT

## 2023-04-05 PROCEDURE — 85730 THROMBOPLASTIN TIME PARTIAL: CPT | Performed by: EMERGENCY MEDICINE

## 2023-04-05 PROCEDURE — 93010 ELECTROCARDIOGRAM REPORT: CPT | Performed by: INTERNAL MEDICINE

## 2023-04-05 RX ORDER — FLUTICASONE PROPIONATE 50 MCG
1 SPRAY, SUSPENSION (ML) NASAL 2 TIMES DAILY PRN
Status: DISCONTINUED | OUTPATIENT
Start: 2023-04-05 | End: 2023-04-09

## 2023-04-05 RX ORDER — LABETALOL HYDROCHLORIDE 5 MG/ML
20 INJECTION, SOLUTION INTRAVENOUS ONCE
Status: COMPLETED | OUTPATIENT
Start: 2023-04-05 | End: 2023-04-05

## 2023-04-05 RX ORDER — MORPHINE SULFATE 4 MG/ML
2 INJECTION, SOLUTION INTRAMUSCULAR; INTRAVENOUS ONCE
Status: COMPLETED | OUTPATIENT
Start: 2023-04-05 | End: 2023-04-05

## 2023-04-05 RX ORDER — FENOFIBRATE 134 MG/1
134 CAPSULE ORAL NIGHTLY
Status: DISCONTINUED | OUTPATIENT
Start: 2023-04-05 | End: 2023-04-09

## 2023-04-05 RX ORDER — MORPHINE SULFATE 2 MG/ML
3 INJECTION, SOLUTION INTRAMUSCULAR; INTRAVENOUS EVERY 2 HOUR PRN
Status: DISCONTINUED | OUTPATIENT
Start: 2023-04-05 | End: 2023-04-08

## 2023-04-05 RX ORDER — HEPARIN SODIUM 1000 [USP'U]/ML
5000 INJECTION, SOLUTION INTRAVENOUS; SUBCUTANEOUS ONCE
Status: COMPLETED | OUTPATIENT
Start: 2023-04-05 | End: 2023-04-05

## 2023-04-05 RX ORDER — HEPARIN SODIUM AND DEXTROSE 10000; 5 [USP'U]/100ML; G/100ML
INJECTION INTRAVENOUS CONTINUOUS
Status: DISCONTINUED | OUTPATIENT
Start: 2023-04-06 | End: 2023-04-07

## 2023-04-05 RX ORDER — LOSARTAN POTASSIUM 50 MG/1
50 TABLET ORAL DAILY
COMMUNITY
End: 2023-04-21

## 2023-04-05 RX ORDER — LOSARTAN POTASSIUM 50 MG/1
50 TABLET ORAL DAILY
Status: DISCONTINUED | OUTPATIENT
Start: 2023-04-05 | End: 2023-04-09

## 2023-04-05 RX ORDER — FUROSEMIDE 40 MG/1
40 TABLET ORAL DAILY
Status: DISCONTINUED | OUTPATIENT
Start: 2023-04-05 | End: 2023-04-06

## 2023-04-05 RX ORDER — CARVEDILOL 12.5 MG/1
25 TABLET ORAL 2 TIMES DAILY WITH MEALS
Status: DISCONTINUED | OUTPATIENT
Start: 2023-04-05 | End: 2023-04-09

## 2023-04-05 RX ORDER — LAMOTRIGINE 150 MG/1
150 TABLET ORAL DAILY
Status: DISCONTINUED | OUTPATIENT
Start: 2023-04-06 | End: 2023-04-09

## 2023-04-05 RX ORDER — ERGOCALCIFEROL 1.25 MG/1
50000 CAPSULE ORAL
COMMUNITY

## 2023-04-05 RX ORDER — ISOSORBIDE MONONITRATE 30 MG/1
30 TABLET, EXTENDED RELEASE ORAL DAILY
Status: DISCONTINUED | OUTPATIENT
Start: 2023-04-06 | End: 2023-04-09

## 2023-04-05 RX ORDER — HEPARIN SODIUM AND DEXTROSE 10000; 5 [USP'U]/100ML; G/100ML
1000 INJECTION INTRAVENOUS ONCE
Status: COMPLETED | OUTPATIENT
Start: 2023-04-05 | End: 2023-04-05

## 2023-04-05 RX ORDER — MORPHINE SULFATE 2 MG/ML
2 INJECTION, SOLUTION INTRAMUSCULAR; INTRAVENOUS EVERY 2 HOUR PRN
Status: DISCONTINUED | OUTPATIENT
Start: 2023-04-05 | End: 2023-04-09

## 2023-04-05 RX ORDER — PROCHLORPERAZINE EDISYLATE 5 MG/ML
5 INJECTION INTRAMUSCULAR; INTRAVENOUS EVERY 8 HOURS PRN
Status: DISCONTINUED | OUTPATIENT
Start: 2023-04-05 | End: 2023-04-09

## 2023-04-05 RX ORDER — FLUOXETINE HYDROCHLORIDE 20 MG/1
40 CAPSULE ORAL DAILY
Status: DISCONTINUED | OUTPATIENT
Start: 2023-04-06 | End: 2023-04-09

## 2023-04-05 RX ORDER — ACETAMINOPHEN 500 MG
500 TABLET ORAL EVERY 6 HOURS PRN
Status: DISCONTINUED | OUTPATIENT
Start: 2023-04-05 | End: 2023-04-09

## 2023-04-05 RX ORDER — MORPHINE SULFATE 4 MG/ML
4 INJECTION, SOLUTION INTRAMUSCULAR; INTRAVENOUS ONCE
Status: COMPLETED | OUTPATIENT
Start: 2023-04-05 | End: 2023-04-05

## 2023-04-05 RX ORDER — MORPHINE SULFATE 4 MG/ML
4 INJECTION, SOLUTION INTRAMUSCULAR; INTRAVENOUS EVERY 2 HOUR PRN
Status: DISCONTINUED | OUTPATIENT
Start: 2023-04-05 | End: 2023-04-08

## 2023-04-05 RX ORDER — DICYCLOMINE HCL 20 MG
20 TABLET ORAL 3 TIMES DAILY
Status: DISCONTINUED | OUTPATIENT
Start: 2023-04-05 | End: 2023-04-09

## 2023-04-05 RX ORDER — ONDANSETRON 2 MG/ML
4 INJECTION INTRAMUSCULAR; INTRAVENOUS EVERY 6 HOURS PRN
Status: DISCONTINUED | OUTPATIENT
Start: 2023-04-05 | End: 2023-04-09

## 2023-04-05 RX ORDER — NIFEDIPINE 30 MG/1
30 TABLET, FILM COATED, EXTENDED RELEASE ORAL DAILY
Status: ON HOLD | COMMUNITY
End: 2023-04-21

## 2023-04-05 RX ORDER — KETOROLAC TROMETHAMINE 15 MG/ML
15 INJECTION, SOLUTION INTRAMUSCULAR; INTRAVENOUS ONCE
Status: COMPLETED | OUTPATIENT
Start: 2023-04-05 | End: 2023-04-05

## 2023-04-05 RX ORDER — HYDRALAZINE HYDROCHLORIDE 20 MG/ML
20 INJECTION INTRAMUSCULAR; INTRAVENOUS ONCE
Status: COMPLETED | OUTPATIENT
Start: 2023-04-05 | End: 2023-04-05

## 2023-04-05 RX ORDER — ALBUTEROL SULFATE 90 UG/1
1 AEROSOL, METERED RESPIRATORY (INHALATION) EVERY 6 HOURS PRN
Status: DISCONTINUED | OUTPATIENT
Start: 2023-04-05 | End: 2023-04-09

## 2023-04-05 RX ORDER — ATORVASTATIN CALCIUM 20 MG/1
20 TABLET, FILM COATED ORAL NIGHTLY
Status: DISCONTINUED | OUTPATIENT
Start: 2023-04-05 | End: 2023-04-09

## 2023-04-05 RX ORDER — LABETALOL HYDROCHLORIDE 5 MG/ML
20 INJECTION, SOLUTION INTRAVENOUS EVERY 4 HOURS PRN
Status: DISCONTINUED | OUTPATIENT
Start: 2023-04-05 | End: 2023-04-09

## 2023-04-05 NOTE — ED QUICK NOTES
Spoke with lab regarding pending BNP. States equipment is down and being worked on. Will notify RN when running and resulted.

## 2023-04-05 NOTE — ED INITIAL ASSESSMENT (HPI)
Pt presents with lower abd pain and dysuria x2 days, right sided chest pain and shoulder pain since yesterday. And dark colored urine starting today.

## 2023-04-05 NOTE — ED QUICK NOTES
Spoke with ED Physican regarding pt's resulted BNP. MD aware. No further orders at this time. Jose Davidson for pt to be transported to [facility].

## 2023-04-06 ENCOUNTER — APPOINTMENT (OUTPATIENT)
Dept: CV DIAGNOSTICS | Facility: HOSPITAL | Age: 43
End: 2023-04-06
Attending: INTERNAL MEDICINE
Payer: MEDICAID

## 2023-04-06 LAB
ANION GAP SERPL CALC-SCNC: 6 MMOL/L (ref 0–18)
APTT PPP: 38.9 SECONDS (ref 23.3–35.6)
APTT PPP: 42.6 SECONDS (ref 23.3–35.6)
APTT PPP: 52.4 SECONDS (ref 23.3–35.6)
ATRIAL RATE: 103 BPM
ATRIAL RATE: 113 BPM
BASOPHILS # BLD AUTO: 0.06 X10(3) UL (ref 0–0.2)
BASOPHILS NFR BLD AUTO: 0.8 %
BUN BLD-MCNC: 36 MG/DL (ref 7–18)
CALCIUM BLD-MCNC: 8.4 MG/DL (ref 8.5–10.1)
CHLORIDE SERPL-SCNC: 113 MMOL/L (ref 98–112)
CO2 SERPL-SCNC: 21 MMOL/L (ref 21–32)
CREAT BLD-MCNC: 3.3 MG/DL
EOSINOPHIL # BLD AUTO: 0.32 X10(3) UL (ref 0–0.7)
EOSINOPHIL NFR BLD AUTO: 4.2 %
ERYTHROCYTE [DISTWIDTH] IN BLOOD BY AUTOMATED COUNT: 15.2 %
ERYTHROCYTE [DISTWIDTH] IN BLOOD BY AUTOMATED COUNT: 15.4 %
EST. AVERAGE GLUCOSE BLD GHB EST-MCNC: 97 MG/DL (ref 68–126)
GFR SERPLBLD BASED ON 1.73 SQ M-ARVRAT: 23 ML/MIN/1.73M2 (ref 60–?)
GLUCOSE BLD-MCNC: 141 MG/DL (ref 70–99)
GLUCOSE BLD-MCNC: 145 MG/DL (ref 70–99)
GLUCOSE BLD-MCNC: 149 MG/DL (ref 70–99)
GLUCOSE BLD-MCNC: 161 MG/DL (ref 70–99)
GLUCOSE BLD-MCNC: 208 MG/DL (ref 70–99)
HBA1C MFR BLD: 5 % (ref ?–5.7)
HCT VFR BLD AUTO: 29.6 %
HCT VFR BLD AUTO: 31.9 %
HGB BLD-MCNC: 10.3 G/DL
HGB BLD-MCNC: 9.6 G/DL
IMM GRANULOCYTES # BLD AUTO: 0.03 X10(3) UL (ref 0–1)
IMM GRANULOCYTES NFR BLD: 0.4 %
LYMPHOCYTES # BLD AUTO: 1.85 X10(3) UL (ref 1–4)
LYMPHOCYTES NFR BLD AUTO: 24.3 %
MCH RBC QN AUTO: 30.7 PG (ref 26–34)
MCH RBC QN AUTO: 30.9 PG (ref 26–34)
MCHC RBC AUTO-ENTMCNC: 32.3 G/DL (ref 31–37)
MCHC RBC AUTO-ENTMCNC: 32.4 G/DL (ref 31–37)
MCV RBC AUTO: 94.9 FL
MCV RBC AUTO: 95.2 FL
MONOCYTES # BLD AUTO: 0.73 X10(3) UL (ref 0.1–1)
MONOCYTES NFR BLD AUTO: 9.6 %
NEUTROPHILS # BLD AUTO: 4.62 X10 (3) UL (ref 1.5–7.7)
NEUTROPHILS # BLD AUTO: 4.62 X10(3) UL (ref 1.5–7.7)
NEUTROPHILS NFR BLD AUTO: 60.7 %
OSMOLALITY SERPL CALC.SUM OF ELEC: 302 MOSM/KG (ref 275–295)
P AXIS: 44 DEGREES
P AXIS: 60 DEGREES
P-R INTERVAL: 170 MS
P-R INTERVAL: 172 MS
PLATELET # BLD AUTO: 262 10(3)UL (ref 150–450)
PLATELET # BLD AUTO: 264 10(3)UL (ref 150–450)
POTASSIUM SERPL-SCNC: 4 MMOL/L (ref 3.5–5.1)
Q-T INTERVAL: 350 MS
Q-T INTERVAL: 390 MS
QRS DURATION: 86 MS
QRS DURATION: 88 MS
QTC CALCULATION (BEZET): 480 MS
QTC CALCULATION (BEZET): 510 MS
R AXIS: 7 DEGREES
R AXIS: 8 DEGREES
RBC # BLD AUTO: 3.11 X10(6)UL
RBC # BLD AUTO: 3.36 X10(6)UL
SODIUM SERPL-SCNC: 140 MMOL/L (ref 136–145)
T AXIS: 29 DEGREES
T AXIS: 66 DEGREES
TROPONIN I HIGH SENSITIVITY: 486 NG/L
VENTRICULAR RATE: 103 BPM
VENTRICULAR RATE: 113 BPM
WBC # BLD AUTO: 6.9 X10(3) UL (ref 4–11)
WBC # BLD AUTO: 7.6 X10(3) UL (ref 4–11)

## 2023-04-06 PROCEDURE — 93306 TTE W/DOPPLER COMPLETE: CPT | Performed by: INTERNAL MEDICINE

## 2023-04-06 PROCEDURE — 80048 BASIC METABOLIC PNL TOTAL CA: CPT | Performed by: HOSPITALIST

## 2023-04-06 PROCEDURE — 82962 GLUCOSE BLOOD TEST: CPT

## 2023-04-06 PROCEDURE — 85027 COMPLETE CBC AUTOMATED: CPT | Performed by: HOSPITALIST

## 2023-04-06 PROCEDURE — 84484 ASSAY OF TROPONIN QUANT: CPT | Performed by: INTERNAL MEDICINE

## 2023-04-06 PROCEDURE — 85025 COMPLETE CBC W/AUTO DIFF WBC: CPT | Performed by: HOSPITALIST

## 2023-04-06 PROCEDURE — 83036 HEMOGLOBIN GLYCOSYLATED A1C: CPT | Performed by: HOSPITALIST

## 2023-04-06 PROCEDURE — 85730 THROMBOPLASTIN TIME PARTIAL: CPT | Performed by: INTERNAL MEDICINE

## 2023-04-06 PROCEDURE — 51701 INSERT BLADDER CATHETER: CPT

## 2023-04-06 RX ORDER — SODIUM CHLORIDE 9 MG/ML
INJECTION, SOLUTION INTRAVENOUS
Status: DISCONTINUED | OUTPATIENT
Start: 2023-04-07 | End: 2023-04-06

## 2023-04-06 RX ORDER — ASPIRIN 81 MG/1
81 TABLET, CHEWABLE ORAL ONCE
Status: COMPLETED | OUTPATIENT
Start: 2023-04-07 | End: 2023-04-07

## 2023-04-06 RX ORDER — NICOTINE POLACRILEX 4 MG
15 LOZENGE BUCCAL
Status: DISCONTINUED | OUTPATIENT
Start: 2023-04-06 | End: 2023-04-09

## 2023-04-06 RX ORDER — SODIUM CHLORIDE 9 MG/ML
INJECTION, SOLUTION INTRAVENOUS CONTINUOUS
Status: ACTIVE | OUTPATIENT
Start: 2023-04-07 | End: 2023-04-07

## 2023-04-06 RX ORDER — HEPARIN SODIUM 1000 [USP'U]/ML
3000 INJECTION, SOLUTION INTRAVENOUS; SUBCUTANEOUS ONCE
Status: COMPLETED | OUTPATIENT
Start: 2023-04-06 | End: 2023-04-06

## 2023-04-06 RX ORDER — DEXTROSE MONOHYDRATE 25 G/50ML
50 INJECTION, SOLUTION INTRAVENOUS
Status: DISCONTINUED | OUTPATIENT
Start: 2023-04-06 | End: 2023-04-09

## 2023-04-06 RX ORDER — NICOTINE POLACRILEX 4 MG
30 LOZENGE BUCCAL
Status: DISCONTINUED | OUTPATIENT
Start: 2023-04-06 | End: 2023-04-09

## 2023-04-06 NOTE — PLAN OF CARE
Assumed patient care at 0730. Patient resting in bed slightly drowsy, but oriented x4. Nasal cannula 2L. SOB at rest. Heparin gtt infusing per ACS protocol. Monitor shows sinus rhythm. SBP goal < 160 maintained. Cardene gtt weaned off. Tolerating diet. Dr. Eugenia Monroy notified of abdominal sutures from procedure 2/23. MD will remove tomorrow. Voiding in urinal. Patient complains of right shoulder pain, see MAR. Plan on cardiac cath tomorrow. Consent signed, placed in chart. NPO at midnight.

## 2023-04-06 NOTE — PLAN OF CARE
Assumed care of patient at [address]. Pt had arrived from [facility] ER approx 1850. Patient is AOX4, following commands, and KONG. Heparin drip infusing per ACS protocol and Cardene drip infusing for elevated SBP. Pt complaining of R shoulder pain that is radiating into R upper chest and neck area. Pain is worse when he lifts his arm or turns his head to the left. Dr. Keegan Jernigan paged by previous RN and called back. He was updated on patients pain and vital signs. Orders received and carried out. Pt tolerating po fluids and eating without difficulty. Morphine IVP given for pain. Pt sleeping on and off. POC discussed with patient. For complete assessment see E charting.

## 2023-04-07 ENCOUNTER — APPOINTMENT (OUTPATIENT)
Dept: INTERVENTIONAL RADIOLOGY/VASCULAR | Facility: HOSPITAL | Age: 43
End: 2023-04-07
Payer: MEDICAID

## 2023-04-07 LAB
ALBUMIN SERPL-MCNC: 2.7 G/DL (ref 3.4–5)
ANION GAP SERPL CALC-SCNC: 10 MMOL/L (ref 0–18)
APTT PPP: 58 SECONDS (ref 23.3–35.6)
BASOPHILS # BLD AUTO: 0.03 X10(3) UL (ref 0–0.2)
BASOPHILS NFR BLD AUTO: 0.5 %
BUN BLD-MCNC: 46 MG/DL (ref 7–18)
CALCIUM BLD-MCNC: 8.2 MG/DL (ref 8.5–10.1)
CHLORIDE SERPL-SCNC: 111 MMOL/L (ref 98–112)
CO2 SERPL-SCNC: 21 MMOL/L (ref 21–32)
CREAT BLD-MCNC: 4.13 MG/DL
EOSINOPHIL # BLD AUTO: 0.32 X10(3) UL (ref 0–0.7)
EOSINOPHIL NFR BLD AUTO: 5.5 %
ERYTHROCYTE [DISTWIDTH] IN BLOOD BY AUTOMATED COUNT: 14.8 %
GFR SERPLBLD BASED ON 1.73 SQ M-ARVRAT: 17 ML/MIN/1.73M2 (ref 60–?)
GLUCOSE BLD-MCNC: 106 MG/DL (ref 70–99)
GLUCOSE BLD-MCNC: 134 MG/DL (ref 70–99)
GLUCOSE BLD-MCNC: 145 MG/DL (ref 70–99)
GLUCOSE BLD-MCNC: 195 MG/DL (ref 70–99)
GLUCOSE BLD-MCNC: 207 MG/DL (ref 70–99)
HCT VFR BLD AUTO: 28.8 %
HGB BLD-MCNC: 9.2 G/DL
IMM GRANULOCYTES # BLD AUTO: 0.02 X10(3) UL (ref 0–1)
IMM GRANULOCYTES NFR BLD: 0.3 %
LYMPHOCYTES # BLD AUTO: 1.95 X10(3) UL (ref 1–4)
LYMPHOCYTES NFR BLD AUTO: 33.8 %
MCH RBC QN AUTO: 30.7 PG (ref 26–34)
MCHC RBC AUTO-ENTMCNC: 31.9 G/DL (ref 31–37)
MCV RBC AUTO: 96 FL
MONOCYTES # BLD AUTO: 0.57 X10(3) UL (ref 0.1–1)
MONOCYTES NFR BLD AUTO: 9.9 %
NEUTROPHILS # BLD AUTO: 2.88 X10 (3) UL (ref 1.5–7.7)
NEUTROPHILS # BLD AUTO: 2.88 X10(3) UL (ref 1.5–7.7)
NEUTROPHILS NFR BLD AUTO: 50 %
OSMOLALITY SERPL CALC.SUM OF ELEC: 308 MOSM/KG (ref 275–295)
PHOSPHATE SERPL-MCNC: 5.7 MG/DL (ref 2.5–4.9)
PLATELET # BLD AUTO: 235 10(3)UL (ref 150–450)
POTASSIUM SERPL-SCNC: 4.1 MMOL/L (ref 3.5–5.1)
RBC # BLD AUTO: 3 X10(6)UL
SODIUM SERPL-SCNC: 142 MMOL/L (ref 136–145)
WBC # BLD AUTO: 5.8 X10(3) UL (ref 4–11)

## 2023-04-07 PROCEDURE — 4A023N7 MEASUREMENT OF CARDIAC SAMPLING AND PRESSURE, LEFT HEART, PERCUTANEOUS APPROACH: ICD-10-PCS | Performed by: INTERNAL MEDICINE

## 2023-04-07 PROCEDURE — B2111ZZ FLUOROSCOPY OF MULTIPLE CORONARY ARTERIES USING LOW OSMOLAR CONTRAST: ICD-10-PCS | Performed by: INTERNAL MEDICINE

## 2023-04-07 PROCEDURE — 85730 THROMBOPLASTIN TIME PARTIAL: CPT | Performed by: INTERNAL MEDICINE

## 2023-04-07 PROCEDURE — 80069 RENAL FUNCTION PANEL: CPT | Performed by: HOSPITALIST

## 2023-04-07 PROCEDURE — 99152 MOD SED SAME PHYS/QHP 5/>YRS: CPT | Performed by: INTERNAL MEDICINE

## 2023-04-07 PROCEDURE — 93458 L HRT ARTERY/VENTRICLE ANGIO: CPT | Performed by: INTERNAL MEDICINE

## 2023-04-07 PROCEDURE — 82962 GLUCOSE BLOOD TEST: CPT

## 2023-04-07 PROCEDURE — 85025 COMPLETE CBC W/AUTO DIFF WBC: CPT | Performed by: HOSPITALIST

## 2023-04-07 RX ORDER — NITROGLYCERIN 20 MG/100ML
INJECTION INTRAVENOUS
Status: COMPLETED
Start: 2023-04-07 | End: 2023-04-07

## 2023-04-07 RX ORDER — IODIXANOL 320 MG/ML
100 INJECTION, SOLUTION INTRAVASCULAR
Status: COMPLETED | OUTPATIENT
Start: 2023-04-07 | End: 2023-04-07

## 2023-04-07 RX ORDER — VERAPAMIL HYDROCHLORIDE 2.5 MG/ML
INJECTION, SOLUTION INTRAVENOUS
Status: COMPLETED
Start: 2023-04-07 | End: 2023-04-07

## 2023-04-07 RX ORDER — LIDOCAINE HYDROCHLORIDE 10 MG/ML
INJECTION, SOLUTION EPIDURAL; INFILTRATION; INTRACAUDAL; PERINEURAL
Status: COMPLETED
Start: 2023-04-07 | End: 2023-04-07

## 2023-04-07 RX ORDER — NIFEDIPINE 30 MG/1
30 TABLET, EXTENDED RELEASE ORAL DAILY
Status: DISCONTINUED | OUTPATIENT
Start: 2023-04-07 | End: 2023-04-09

## 2023-04-07 RX ORDER — MIDAZOLAM HYDROCHLORIDE 1 MG/ML
INJECTION INTRAMUSCULAR; INTRAVENOUS
Status: COMPLETED
Start: 2023-04-07 | End: 2023-04-07

## 2023-04-07 RX ORDER — HEPARIN SODIUM 5000 [USP'U]/ML
INJECTION, SOLUTION INTRAVENOUS; SUBCUTANEOUS
Status: COMPLETED
Start: 2023-04-07 | End: 2023-04-07

## 2023-04-07 RX ORDER — SODIUM CHLORIDE 9 MG/ML
INJECTION, SOLUTION INTRAVENOUS CONTINUOUS
Status: ACTIVE | OUTPATIENT
Start: 2023-04-07 | End: 2023-04-07

## 2023-04-07 NOTE — PLAN OF CARE
Assumed patient care at 0730. Patient resting in bed, VSS, on 2L NC. NSR with BBB on monitor. Heparin infusing per ACS protocol. Patient requesting morphine this morning for R sided chest/shoulder pain, relief seen. Plan for cardiac cath today. Patient able to have \"treadmill breakfast\" per Dr. Christine Mendez, small sips with meds. Abdominal sutures removed by Dr. Arabella Green at bedside, site C/D/I, no drainage noted. Taken to procedure around 1400. Back around 1500 - R radial approach. TR band air removed per protocol. Site C/D/I, no hematoma, sensation and pulses intact. Eliquis to be restarted tonight. NS infusing for 6 hours post procedure. Will continue to monitor. Problem: CARDIOVASCULAR - ADULT  Goal: Maintains optimal cardiac output and hemodynamic stability  Description: INTERVENTIONS:  - Monitor vital signs, rhythm, and trends  - Monitor for bleeding, hypotension and signs of decreased cardiac output  - Evaluate effectiveness of vasoactive medications to optimize hemodynamic stability  - Monitor arterial and/or venous puncture sites for bleeding and/or hematoma  - Assess quality of pulses, skin color and temperature  - Assess for signs of decreased coronary artery perfusion - ex.  Angina  - Evaluate fluid balance, assess for edema, trend weights  Outcome: Progressing     Problem: CARDIOVASCULAR - ADULT  Goal: Absence of cardiac arrhythmias or at baseline  Description: INTERVENTIONS:  - Continuous cardiac monitoring, monitor vital signs, obtain 12 lead EKG if indicated  - Evaluate effectiveness of antiarrhythmic and heart rate control medications as ordered  - Initiate emergency measures for life threatening arrhythmias  - Monitor electrolytes and administer replacement therapy as ordered  Outcome: Progressing

## 2023-04-07 NOTE — PROGRESS NOTES
04/07/23 0958   Clinical Encounter Type   Visited With Patient; Health care provider   Routine Visit Introduction   Continue Visiting No   Referral From Verbal;   Referral To    Taxonomy   Intended Effects Demonstrate caring and concern   Methods Offer support; Offer emotional support   Interventions Acknowledge current situation; Active listening; Ask guided questions; Ask questions to bring forth feelings; Explain  role;Silent prayer     Discussion:   received verbal referral from .  met with Pt (Godwin). Qian Chino reported that he was feeling tired and stated that he was feeling \"okay\" about his procedure later in the day saying he had a similar procedure in November. Qian Chino did not have any spiritual needs at this time but is aware that chaplains are available at any time and spiritual care can be requested via RN. Spiritual Care support can be requested via an [facility] consult or ext. [phone].        Lilliana Caballero M.Div, Chaplain Resident  Ext. [phone]

## 2023-04-07 NOTE — PROCEDURES
[address] [facility] Location: Cath Lab    [facility] [id number] MRN [id number]   Admission Date 4/5/2023 Procedure Date 4/7/2023   Attending Physician Alfredo Garcia MD Procedure Physician Gato Zarate MD         CARDIAC CATHETERIZATION REPORT     PREOPERATIVE DIAGNOSIS:  NSTEMI, hypertensive urgency, advanced kidney disease  POSTOPERATIVE DIAGNOSIS:  same as above, non-obstructive coronary artery disesae. PROCEDURE PERFORMED:  left heart catheterization, selective coronary angiography      PROCEDURE:  The patient was brought to the cardiac catheterization lab in the fasting state. Informed consent was obtained. Moderate sedation was employed using a total of IV Versed 3mg and IV fentanyl 50mcg. I directly observed the patient from [id number] to 1423, for a total of 19 minutes, and an independent trained observer was present and assisted in the monitoring of the patient's level of consciousness and physiological status, watching the heart rate, blood pressure, oximetry, and rhythm, in addition to total moderation time. ACCESS/CATHETER PLACEMENT:   The right radial area was prepped and draped in a sterile manner and anesthetized with 2% lidocaine. The right radial artery was accessed, and a 6-Slovak, 11 cm sheath was placed. Left and right selective coronary angiography was performed using a 6F Tiger4. 0 catheter. The catheter was used to cross the aortic valve, measure left ventricular pressure and pulled across the valve to assess for aortic stenosis. At the conclusion of the study, the right radial artery hemostasis was performed using a radial band. FINDINGS:      1. Left heart catheterization:    Left ventricle: 124/24 mmHg  Aorta: 116/83/95 mmHg  Left ventriculogram not performed to minimize contrast exposure. There was no aortic stenosis upon pullback of the catheter.     2.  Selective coronary angiography:      Left main artery: The left main artery is a large, trifurcating vessel without significant angiographic disease. LAD:  The left anterior descending artery is a large caliber vessel that wraps around the apex and gives rise to two small diagonals. Mild luminal irregularities present. LCx: The left circumflex artery is a large size vessel that gives rise to a distal left PL brnach. The ramus is a large caliber branching vessel. Luminal irregularities. RCA:  The right coronary artery is a medium size, dominant vessel that gives rise to a medium, rPDA. Luminal irregularities. MEDICATIONS:  See nursing record. COMPLICATIONS:  No major complications were observed during this visit to the catheterization lab. IMPRESSION:    1. Left heart catheterization: LVEDP 24mmHg, no aortic stenosis. LV gram not performed to minimize contrast exposure.     2.  Coronary angiography:  right dominant system  - LM: no significant angiographic disease  - LAD: mild luminal irregularities  - LCx: mild luminal irregularities  - RCA: mild luminal irregularities     RECOMMENDATIONS: No angiographic indication for coronary revascularization

## 2023-04-07 NOTE — CM/SW NOTE
Confirmed that patient is current with nursing services with [facility] [place]  Phone  [phone]  GZX  [phone]    Resumption order and clinical updates faxed to [facility] agency

## 2023-04-07 NOTE — PLAN OF CARE
Assumed care of patient at 1. Pt sleeping in bed, but easy to arouse. orientated x4, following commands, and KONG. Denies dizziness or nausea. Morphine given as needed for right shoulder and right chest pain. Pt states right shoulder pain \"shoots\" down to his elbow. Tolerating diet. Great appetite. Pt  ate sandwich and crackers before midnight. Now NPO for cardiac cath. Heparin drip infusing per ACS protocol. Dr. Catalino Richter notified per Heparin ACS protocol regarding three consecutive PTT results requiring interventions. No orders obtained. POC discussed with patient. For complete assessment see E charting.

## 2023-04-08 ENCOUNTER — APPOINTMENT (OUTPATIENT)
Dept: GENERAL RADIOLOGY | Facility: HOSPITAL | Age: 43
End: 2023-04-08
Attending: INTERNAL MEDICINE
Payer: MEDICAID

## 2023-04-08 LAB
ALBUMIN SERPL-MCNC: 3 G/DL (ref 3.4–5)
ANION GAP SERPL CALC-SCNC: 8 MMOL/L (ref 0–18)
BASOPHILS # BLD AUTO: 0.04 X10(3) UL (ref 0–0.2)
BASOPHILS NFR BLD AUTO: 0.8 %
BUN BLD-MCNC: 42 MG/DL (ref 7–18)
CALCIUM BLD-MCNC: 8.2 MG/DL (ref 8.5–10.1)
CHLORIDE SERPL-SCNC: 112 MMOL/L (ref 98–112)
CO2 SERPL-SCNC: 21 MMOL/L (ref 21–32)
CREAT BLD-MCNC: 3.78 MG/DL
EOSINOPHIL # BLD AUTO: 0.3 X10(3) UL (ref 0–0.7)
EOSINOPHIL NFR BLD AUTO: 5.9 %
ERYTHROCYTE [DISTWIDTH] IN BLOOD BY AUTOMATED COUNT: 14.6 %
GFR SERPLBLD BASED ON 1.73 SQ M-ARVRAT: 19 ML/MIN/1.73M2 (ref 60–?)
GLUCOSE BLD-MCNC: 121 MG/DL (ref 70–99)
GLUCOSE BLD-MCNC: 148 MG/DL (ref 70–99)
GLUCOSE BLD-MCNC: 158 MG/DL (ref 70–99)
GLUCOSE BLD-MCNC: 172 MG/DL (ref 70–99)
HCT VFR BLD AUTO: 29.9 %
HGB BLD-MCNC: 9.4 G/DL
IMM GRANULOCYTES # BLD AUTO: 0.02 X10(3) UL (ref 0–1)
IMM GRANULOCYTES NFR BLD: 0.4 %
LYMPHOCYTES # BLD AUTO: 1.4 X10(3) UL (ref 1–4)
LYMPHOCYTES NFR BLD AUTO: 27.3 %
MCH RBC QN AUTO: 29.8 PG (ref 26–34)
MCHC RBC AUTO-ENTMCNC: 31.4 G/DL (ref 31–37)
MCV RBC AUTO: 94.9 FL
MONOCYTES # BLD AUTO: 0.58 X10(3) UL (ref 0.1–1)
MONOCYTES NFR BLD AUTO: 11.3 %
NEUTROPHILS # BLD AUTO: 2.78 X10 (3) UL (ref 1.5–7.7)
NEUTROPHILS # BLD AUTO: 2.78 X10(3) UL (ref 1.5–7.7)
NEUTROPHILS NFR BLD AUTO: 54.3 %
OSMOLALITY SERPL CALC.SUM OF ELEC: 304 MOSM/KG (ref 275–295)
PHOSPHATE SERPL-MCNC: 5.1 MG/DL (ref 2.5–4.9)
PLATELET # BLD AUTO: 259 10(3)UL (ref 150–450)
POTASSIUM SERPL-SCNC: 4.4 MMOL/L (ref 3.5–5.1)
RBC # BLD AUTO: 3.15 X10(6)UL
SODIUM SERPL-SCNC: 141 MMOL/L (ref 136–145)
WBC # BLD AUTO: 5.1 X10(3) UL (ref 4–11)

## 2023-04-08 PROCEDURE — 82962 GLUCOSE BLOOD TEST: CPT

## 2023-04-08 PROCEDURE — 85025 COMPLETE CBC W/AUTO DIFF WBC: CPT | Performed by: HOSPITALIST

## 2023-04-08 PROCEDURE — 80069 RENAL FUNCTION PANEL: CPT | Performed by: HOSPITALIST

## 2023-04-08 PROCEDURE — 74018 RADEX ABDOMEN 1 VIEW: CPT | Performed by: INTERNAL MEDICINE

## 2023-04-08 RX ORDER — DEXTROAMPHETAMINE SACCHARATE, AMPHETAMINE ASPARTATE, DEXTROAMPHETAMINE SULFATE AND AMPHETAMINE SULFATE 1.25; 1.25; 1.25; 1.25 MG/1; MG/1; MG/1; MG/1
20 TABLET ORAL 2 TIMES DAILY
Status: DISCONTINUED | OUTPATIENT
Start: 2023-04-08 | End: 2023-04-09

## 2023-04-08 RX ORDER — DOCUSATE SODIUM 100 MG/1
100 CAPSULE, LIQUID FILLED ORAL 2 TIMES DAILY
Status: DISCONTINUED | OUTPATIENT
Start: 2023-04-08 | End: 2023-04-09

## 2023-04-08 RX ORDER — BISACODYL 10 MG
10 SUPPOSITORY, RECTAL RECTAL
Status: DISCONTINUED | OUTPATIENT
Start: 2023-04-08 | End: 2023-04-09

## 2023-04-08 RX ORDER — POLYETHYLENE GLYCOL 3350 17 G/17G
17 POWDER, FOR SOLUTION ORAL DAILY PRN
Status: DISCONTINUED | OUTPATIENT
Start: 2023-04-08 | End: 2023-04-09

## 2023-04-08 RX ORDER — TORSEMIDE 20 MG/1
20 TABLET ORAL DAILY
Status: DISCONTINUED | OUTPATIENT
Start: 2023-04-08 | End: 2023-04-09

## 2023-04-08 RX ORDER — TRAMADOL HYDROCHLORIDE 50 MG/1
50 TABLET ORAL EVERY 12 HOURS PRN
Status: DISCONTINUED | OUTPATIENT
Start: 2023-04-08 | End: 2023-04-09

## 2023-04-08 NOTE — PLAN OF CARE
Assumed care of pt at [address] [place] [address]. Pt complains of right shoulder, right anterior chest and right \"lung\" pain. Pt has requested Morphine throughout the night with minimal stated relief. Warm pack applied to anterior chest with some relief. O2 sat >95% on 2 liters nasal cannula. Pt stated he occasionally feels short of breath; no dyspnea noted. Pt voided clear yellow urine per urinal. Meds given as ordered. SBP has remained within ordered parameters. NSR on monitor. Right radial cath site c/d/I. + CMS. POC reviewed with pt. See Epic for complete assessment.

## 2023-04-09 VITALS
RESPIRATION RATE: 18 BRPM | BODY MASS INDEX: 34.39 KG/M2 | WEIGHT: 259.5 LBS | DIASTOLIC BLOOD PRESSURE: 89 MMHG | HEIGHT: 73 IN | HEART RATE: 83 BPM | TEMPERATURE: 98 F | SYSTOLIC BLOOD PRESSURE: 143 MMHG | OXYGEN SATURATION: 100 %

## 2023-04-09 LAB
ALBUMIN SERPL-MCNC: 3.1 G/DL (ref 3.4–5)
ANION GAP SERPL CALC-SCNC: 8 MMOL/L (ref 0–18)
BASOPHILS # BLD AUTO: 0.06 X10(3) UL (ref 0–0.2)
BASOPHILS NFR BLD AUTO: 1.3 %
BUN BLD-MCNC: 43 MG/DL (ref 7–18)
CALCIUM BLD-MCNC: 8.8 MG/DL (ref 8.5–10.1)
CHLORIDE SERPL-SCNC: 112 MMOL/L (ref 98–112)
CO2 SERPL-SCNC: 21 MMOL/L (ref 21–32)
CREAT BLD-MCNC: 3.61 MG/DL
EOSINOPHIL # BLD AUTO: 0.36 X10(3) UL (ref 0–0.7)
EOSINOPHIL NFR BLD AUTO: 7.9 %
ERYTHROCYTE [DISTWIDTH] IN BLOOD BY AUTOMATED COUNT: 14.3 %
GFR SERPLBLD BASED ON 1.73 SQ M-ARVRAT: 20 ML/MIN/1.73M2 (ref 60–?)
GLUCOSE BLD-MCNC: 129 MG/DL (ref 70–99)
GLUCOSE BLD-MCNC: 169 MG/DL (ref 70–99)
GLUCOSE BLD-MCNC: 94 MG/DL (ref 70–99)
HCT VFR BLD AUTO: 31.2 %
HGB BLD-MCNC: 10.2 G/DL
IMM GRANULOCYTES # BLD AUTO: 0.01 X10(3) UL (ref 0–1)
IMM GRANULOCYTES NFR BLD: 0.2 %
LYMPHOCYTES # BLD AUTO: 1.61 X10(3) UL (ref 1–4)
LYMPHOCYTES NFR BLD AUTO: 35.3 %
MCH RBC QN AUTO: 31 PG (ref 26–34)
MCHC RBC AUTO-ENTMCNC: 32.7 G/DL (ref 31–37)
MCV RBC AUTO: 94.8 FL
MONOCYTES # BLD AUTO: 0.59 X10(3) UL (ref 0.1–1)
MONOCYTES NFR BLD AUTO: 12.9 %
NEUTROPHILS # BLD AUTO: 1.93 X10 (3) UL (ref 1.5–7.7)
NEUTROPHILS # BLD AUTO: 1.93 X10(3) UL (ref 1.5–7.7)
NEUTROPHILS NFR BLD AUTO: 42.4 %
OSMOLALITY SERPL CALC.SUM OF ELEC: 305 MOSM/KG (ref 275–295)
PHOSPHATE SERPL-MCNC: 4.7 MG/DL (ref 2.5–4.9)
PLATELET # BLD AUTO: 270 10(3)UL (ref 150–450)
POTASSIUM SERPL-SCNC: 4.2 MMOL/L (ref 3.5–5.1)
RBC # BLD AUTO: 3.29 X10(6)UL
SODIUM SERPL-SCNC: 141 MMOL/L (ref 136–145)
WBC # BLD AUTO: 4.6 X10(3) UL (ref 4–11)

## 2023-04-09 PROCEDURE — 80069 RENAL FUNCTION PANEL: CPT | Performed by: HOSPITALIST

## 2023-04-09 PROCEDURE — 85025 COMPLETE CBC W/AUTO DIFF WBC: CPT | Performed by: HOSPITALIST

## 2023-04-09 PROCEDURE — 82962 GLUCOSE BLOOD TEST: CPT

## 2023-04-09 RX ORDER — SODIUM BICARBONATE 650 MG/1
650 TABLET ORAL 3 TIMES DAILY
Qty: 90 TABLET | Refills: 0 | Status: SHIPPED | OUTPATIENT
Start: 2023-04-09 | End: 2023-05-09

## 2023-04-09 RX ORDER — DEXTROAMPHETAMINE SACCHARATE, AMPHETAMINE ASPARTATE, DEXTROAMPHETAMINE SULFATE AND AMPHETAMINE SULFATE 5; 5; 5; 5 MG/1; MG/1; MG/1; MG/1
20 TABLET ORAL 2 TIMES DAILY
Qty: 28 TABLET | Refills: 0 | Status: SHIPPED | OUTPATIENT
Start: 2023-04-09 | End: 2023-04-21

## 2023-04-09 RX ORDER — TORSEMIDE 20 MG/1
20 TABLET ORAL DAILY
Qty: 30 TABLET | Refills: 0 | Status: SHIPPED | OUTPATIENT
Start: 2023-04-10 | End: 2023-04-21

## 2023-04-09 RX ORDER — ARIPIPRAZOLE 5 MG/1
15 TABLET ORAL DAILY
Qty: 42 TABLET | Refills: 0 | Status: SHIPPED | OUTPATIENT
Start: 2023-04-09 | End: 2023-04-23

## 2023-04-09 RX ORDER — SODIUM BICARBONATE 325 MG/1
650 TABLET ORAL 3 TIMES DAILY
Status: DISCONTINUED | OUTPATIENT
Start: 2023-04-09 | End: 2023-04-09

## 2023-04-09 NOTE — PLAN OF CARE
Pt is ok to discharge per primary and consults. Discharge instructions including meds & follow ups given. Patient verbalizes understanding & questions answered. IV removed, tele monitor discontinued, all belongings taken with patient. Pt transported off unit via wheelchair to [facility].

## 2023-04-14 ENCOUNTER — APPOINTMENT (OUTPATIENT)
Dept: GENERAL RADIOLOGY | Age: 43
End: 2023-04-14
Attending: EMERGENCY MEDICINE
Payer: MEDICARE

## 2023-04-14 ENCOUNTER — HOSPITAL ENCOUNTER (INPATIENT)
Facility: HOSPITAL | Age: 43
LOS: 3 days | Discharge: HOME HEALTH | DRG: 305 | End: 2023-04-21
Attending: EMERGENCY MEDICINE | Admitting: HOSPITALIST
Payer: MEDICARE

## 2023-04-14 ENCOUNTER — HOSPITAL ENCOUNTER (INPATIENT)
Facility: HOSPITAL | Age: 43
LOS: 3 days | Discharge: HOME HEALTH | End: 2023-04-21
Attending: EMERGENCY MEDICINE | Admitting: HOSPITALIST
Payer: MEDICARE

## 2023-04-14 ENCOUNTER — APPOINTMENT (OUTPATIENT)
Dept: GENERAL RADIOLOGY | Age: 43
DRG: 305 | End: 2023-04-14
Attending: EMERGENCY MEDICINE
Payer: MEDICARE

## 2023-04-14 DIAGNOSIS — R06.02 SOB (SHORTNESS OF BREATH): ICD-10-CM

## 2023-04-14 DIAGNOSIS — R51.9 NONINTRACTABLE HEADACHE, UNSPECIFIED CHRONICITY PATTERN, UNSPECIFIED HEADACHE TYPE: ICD-10-CM

## 2023-04-14 DIAGNOSIS — M25.511 CHRONIC RIGHT SHOULDER PAIN: ICD-10-CM

## 2023-04-14 DIAGNOSIS — G89.29 CHRONIC RIGHT SHOULDER PAIN: ICD-10-CM

## 2023-04-14 DIAGNOSIS — I16.1 HYPERTENSIVE EMERGENCY: Primary | ICD-10-CM

## 2023-04-14 LAB
ALBUMIN SERPL-MCNC: 3.4 G/DL (ref 3.4–5)
ALBUMIN/GLOB SERPL: 0.8 {RATIO} (ref 1–2)
ALP LIVER SERPL-CCNC: 91 U/L
ALT SERPL-CCNC: 26 U/L
ANION GAP SERPL CALC-SCNC: 6 MMOL/L (ref 0–18)
AST SERPL-CCNC: 85 U/L (ref 15–37)
BASOPHILS # BLD AUTO: 0.14 X10(3) UL (ref 0–0.2)
BASOPHILS NFR BLD AUTO: 1.6 %
BILIRUB SERPL-MCNC: 0.9 MG/DL (ref 0.1–2)
BUN BLD-MCNC: 49 MG/DL (ref 7–18)
CALCIUM BLD-MCNC: 9 MG/DL (ref 8.5–10.1)
CHLORIDE SERPL-SCNC: 110 MMOL/L (ref 98–112)
CO2 SERPL-SCNC: 21 MMOL/L (ref 21–32)
CREAT BLD-MCNC: 4.38 MG/DL
EOSINOPHIL # BLD AUTO: 0.33 X10(3) UL (ref 0–0.7)
EOSINOPHIL NFR BLD AUTO: 3.7 %
ERYTHROCYTE [DISTWIDTH] IN BLOOD BY AUTOMATED COUNT: 14.9 %
GFR SERPLBLD BASED ON 1.73 SQ M-ARVRAT: 16 ML/MIN/1.73M2 (ref 60–?)
GLOBULIN PLAS-MCNC: 4.2 G/DL (ref 2.8–4.4)
GLUCOSE BLD-MCNC: 184 MG/DL (ref 70–99)
HCT VFR BLD AUTO: 31.5 %
HGB BLD-MCNC: 10.6 G/DL
IMM GRANULOCYTES # BLD AUTO: 0.03 X10(3) UL (ref 0–1)
IMM GRANULOCYTES NFR BLD: 0.3 %
LYMPHOCYTES # BLD AUTO: 1.63 X10(3) UL (ref 1–4)
LYMPHOCYTES NFR BLD AUTO: 18.1 %
MCH RBC QN AUTO: 31 PG (ref 26–34)
MCHC RBC AUTO-ENTMCNC: 33.7 G/DL (ref 31–37)
MCV RBC AUTO: 92.1 FL
MONOCYTES # BLD AUTO: 0.89 X10(3) UL (ref 0.1–1)
MONOCYTES NFR BLD AUTO: 9.9 %
NEUTROPHILS # BLD AUTO: 6.01 X10 (3) UL (ref 1.5–7.7)
NEUTROPHILS # BLD AUTO: 6.01 X10(3) UL (ref 1.5–7.7)
NEUTROPHILS NFR BLD AUTO: 66.4 %
NT-PROBNP SERPL-MCNC: ABNORMAL PG/ML (ref ?–125)
OSMOLALITY SERPL CALC.SUM OF ELEC: 302 MOSM/KG (ref 275–295)
PLATELET # BLD AUTO: 357 10(3)UL (ref 150–450)
POTASSIUM SERPL-SCNC: 4.7 MMOL/L (ref 3.5–5.1)
PROT SERPL-MCNC: 7.6 G/DL (ref 6.4–8.2)
RBC # BLD AUTO: 3.42 X10(6)UL
SARS-COV-2 RNA RESP QL NAA+PROBE: NOT DETECTED
SODIUM SERPL-SCNC: 137 MMOL/L (ref 136–145)
TROPONIN I HIGH SENSITIVITY: 519 NG/L
WBC # BLD AUTO: 9 X10(3) UL (ref 4–11)

## 2023-04-14 PROCEDURE — 80053 COMPREHEN METABOLIC PANEL: CPT | Performed by: EMERGENCY MEDICINE

## 2023-04-14 PROCEDURE — 96376 TX/PRO/DX INJ SAME DRUG ADON: CPT | Performed by: EMERGENCY MEDICINE

## 2023-04-14 PROCEDURE — 93005 ELECTROCARDIOGRAM TRACING: CPT

## 2023-04-14 PROCEDURE — 80061 LIPID PANEL: CPT | Performed by: EMERGENCY MEDICINE

## 2023-04-14 PROCEDURE — 99285 EMERGENCY DEPT VISIT HI MDM: CPT | Performed by: EMERGENCY MEDICINE

## 2023-04-14 PROCEDURE — 96374 THER/PROPH/DIAG INJ IV PUSH: CPT | Performed by: EMERGENCY MEDICINE

## 2023-04-14 PROCEDURE — 71045 X-RAY EXAM CHEST 1 VIEW: CPT | Performed by: EMERGENCY MEDICINE

## 2023-04-14 PROCEDURE — 96375 TX/PRO/DX INJ NEW DRUG ADDON: CPT | Performed by: EMERGENCY MEDICINE

## 2023-04-14 PROCEDURE — 84484 ASSAY OF TROPONIN QUANT: CPT | Performed by: EMERGENCY MEDICINE

## 2023-04-14 PROCEDURE — 83880 ASSAY OF NATRIURETIC PEPTIDE: CPT | Performed by: EMERGENCY MEDICINE

## 2023-04-14 PROCEDURE — 93010 ELECTROCARDIOGRAM REPORT: CPT | Performed by: EMERGENCY MEDICINE

## 2023-04-14 PROCEDURE — 85025 COMPLETE CBC W/AUTO DIFF WBC: CPT | Performed by: EMERGENCY MEDICINE

## 2023-04-14 RX ORDER — DIPHENHYDRAMINE HYDROCHLORIDE 50 MG/ML
25 INJECTION, SOLUTION INTRAMUSCULAR; INTRAVENOUS ONCE
Status: COMPLETED | OUTPATIENT
Start: 2023-04-14 | End: 2023-04-14

## 2023-04-14 RX ORDER — ACETAMINOPHEN 500 MG
1000 TABLET ORAL ONCE
Status: COMPLETED | OUTPATIENT
Start: 2023-04-14 | End: 2023-04-14

## 2023-04-14 RX ORDER — METOCLOPRAMIDE HYDROCHLORIDE 5 MG/ML
10 INJECTION INTRAMUSCULAR; INTRAVENOUS ONCE
Status: COMPLETED | OUTPATIENT
Start: 2023-04-14 | End: 2023-04-14

## 2023-04-14 RX ORDER — LABETALOL HYDROCHLORIDE 5 MG/ML
20 INJECTION, SOLUTION INTRAVENOUS ONCE
Status: COMPLETED | OUTPATIENT
Start: 2023-04-14 | End: 2023-04-14

## 2023-04-14 RX ORDER — LABETALOL HYDROCHLORIDE 5 MG/ML
40 INJECTION, SOLUTION INTRAVENOUS ONCE
Status: COMPLETED | OUTPATIENT
Start: 2023-04-14 | End: 2023-04-14

## 2023-04-15 PROBLEM — G89.29 CHRONIC RIGHT SHOULDER PAIN: Status: ACTIVE | Noted: 2023-04-15

## 2023-04-15 PROBLEM — R51.9 NONINTRACTABLE HEADACHE, UNSPECIFIED CHRONICITY PATTERN, UNSPECIFIED HEADACHE TYPE: Status: ACTIVE | Noted: 2023-04-15

## 2023-04-15 PROBLEM — M25.511 CHRONIC RIGHT SHOULDER PAIN: Status: ACTIVE | Noted: 2023-04-15

## 2023-04-15 LAB
ALBUMIN SERPL-MCNC: 3.2 G/DL (ref 3.4–5)
ANION GAP SERPL CALC-SCNC: 3 MMOL/L (ref 0–18)
ATRIAL RATE: 98 BPM
BUN BLD-MCNC: 50 MG/DL (ref 7–18)
CALCIUM BLD-MCNC: 9 MG/DL (ref 8.5–10.1)
CHLORIDE SERPL-SCNC: 112 MMOL/L (ref 98–112)
CHOLEST SERPL-MCNC: 166 MG/DL (ref ?–200)
CO2 SERPL-SCNC: 23 MMOL/L (ref 21–32)
CREAT BLD-MCNC: 4.15 MG/DL
GFR SERPLBLD BASED ON 1.73 SQ M-ARVRAT: 17 ML/MIN/1.73M2 (ref 60–?)
GLUCOSE BLD-MCNC: 130 MG/DL (ref 70–99)
GLUCOSE BLD-MCNC: 139 MG/DL (ref 70–99)
GLUCOSE BLD-MCNC: 152 MG/DL (ref 70–99)
GLUCOSE BLD-MCNC: 152 MG/DL (ref 70–99)
GLUCOSE BLD-MCNC: 167 MG/DL (ref 70–99)
GLUCOSE BLD-MCNC: 181 MG/DL (ref 70–99)
HDLC SERPL-MCNC: 56 MG/DL (ref 40–59)
LDLC SERPL CALC-MCNC: 79 MG/DL (ref ?–100)
MAGNESIUM SERPL-MCNC: 2.3 MG/DL (ref 1.6–2.6)
NONHDLC SERPL-MCNC: 110 MG/DL (ref ?–130)
OSMOLALITY SERPL CALC.SUM OF ELEC: 302 MOSM/KG (ref 275–295)
P AXIS: 38 DEGREES
P-R INTERVAL: 188 MS
PHOSPHATE SERPL-MCNC: 4.1 MG/DL (ref 2.5–4.9)
POTASSIUM SERPL-SCNC: 3.9 MMOL/L (ref 3.5–5.1)
Q-T INTERVAL: 382 MS
QRS DURATION: 88 MS
QTC CALCULATION (BEZET): 487 MS
R AXIS: 14 DEGREES
SODIUM SERPL-SCNC: 138 MMOL/L (ref 136–145)
T AXIS: 114 DEGREES
TRIGL SERPL-MCNC: 186 MG/DL (ref 30–149)
TROPONIN I HIGH SENSITIVITY: 484 NG/L
VENTRICULAR RATE: 98 BPM
VLDLC SERPL CALC-MCNC: 29 MG/DL (ref 0–30)

## 2023-04-15 PROCEDURE — 82962 GLUCOSE BLOOD TEST: CPT

## 2023-04-15 PROCEDURE — 83735 ASSAY OF MAGNESIUM: CPT | Performed by: INTERNAL MEDICINE

## 2023-04-15 PROCEDURE — 80069 RENAL FUNCTION PANEL: CPT | Performed by: INTERNAL MEDICINE

## 2023-04-15 PROCEDURE — 84484 ASSAY OF TROPONIN QUANT: CPT | Performed by: EMERGENCY MEDICINE

## 2023-04-15 RX ORDER — FENOFIBRATE 134 MG/1
134 CAPSULE ORAL NIGHTLY
Status: DISCONTINUED | OUTPATIENT
Start: 2023-04-15 | End: 2023-04-21

## 2023-04-15 RX ORDER — BUTALBITAL, ACETAMINOPHEN AND CAFFEINE 50; 325; 40 MG/1; MG/1; MG/1
1 TABLET ORAL EVERY 4 HOURS PRN
Status: DISCONTINUED | OUTPATIENT
Start: 2023-04-15 | End: 2023-04-21

## 2023-04-15 RX ORDER — NIFEDIPINE 30 MG/1
30 TABLET, EXTENDED RELEASE ORAL DAILY
Status: DISCONTINUED | OUTPATIENT
Start: 2023-04-15 | End: 2023-04-15

## 2023-04-15 RX ORDER — TORSEMIDE 20 MG/1
20 TABLET ORAL DAILY
Status: DISCONTINUED | OUTPATIENT
Start: 2023-04-15 | End: 2023-04-21

## 2023-04-15 RX ORDER — LAMOTRIGINE 150 MG/1
150 TABLET ORAL DAILY
Status: DISCONTINUED | OUTPATIENT
Start: 2023-04-15 | End: 2023-04-21

## 2023-04-15 RX ORDER — NIFEDIPINE 60 MG/1
60 TABLET, EXTENDED RELEASE ORAL DAILY
Status: DISCONTINUED | OUTPATIENT
Start: 2023-04-15 | End: 2023-04-21

## 2023-04-15 RX ORDER — LOSARTAN POTASSIUM 50 MG/1
50 TABLET ORAL DAILY
Status: DISCONTINUED | OUTPATIENT
Start: 2023-04-15 | End: 2023-04-16

## 2023-04-15 RX ORDER — NICOTINE POLACRILEX 4 MG
30 LOZENGE BUCCAL
Status: DISCONTINUED | OUTPATIENT
Start: 2023-04-15 | End: 2023-04-21

## 2023-04-15 RX ORDER — SODIUM BICARBONATE 325 MG/1
650 TABLET ORAL 3 TIMES DAILY
Status: DISCONTINUED | OUTPATIENT
Start: 2023-04-15 | End: 2023-04-21

## 2023-04-15 RX ORDER — HYDRALAZINE HYDROCHLORIDE 20 MG/ML
10 INJECTION INTRAMUSCULAR; INTRAVENOUS EVERY 6 HOURS PRN
Status: DISCONTINUED | OUTPATIENT
Start: 2023-04-15 | End: 2023-04-21

## 2023-04-15 RX ORDER — DICYCLOMINE HCL 20 MG
20 TABLET ORAL 3 TIMES DAILY
Status: DISCONTINUED | OUTPATIENT
Start: 2023-04-15 | End: 2023-04-21

## 2023-04-15 RX ORDER — ARIPIPRAZOLE 5 MG/1
15 TABLET ORAL DAILY
Status: DISCONTINUED | OUTPATIENT
Start: 2023-04-15 | End: 2023-04-21

## 2023-04-15 RX ORDER — DEXTROSE MONOHYDRATE 25 G/50ML
50 INJECTION, SOLUTION INTRAVENOUS
Status: DISCONTINUED | OUTPATIENT
Start: 2023-04-15 | End: 2023-04-21

## 2023-04-15 RX ORDER — ERGOCALCIFEROL 1.25 MG/1
50000 CAPSULE, LIQUID FILLED ORAL
Status: DISCONTINUED | OUTPATIENT
Start: 2023-04-17 | End: 2023-04-21

## 2023-04-15 RX ORDER — DEXTROAMPHETAMINE SACCHARATE, AMPHETAMINE ASPARTATE, DEXTROAMPHETAMINE SULFATE AND AMPHETAMINE SULFATE 2.5; 2.5; 2.5; 2.5 MG/1; MG/1; MG/1; MG/1
20 TABLET ORAL 2 TIMES DAILY
Status: DISCONTINUED | OUTPATIENT
Start: 2023-04-15 | End: 2023-04-15

## 2023-04-15 RX ORDER — ISOSORBIDE MONONITRATE 30 MG/1
30 TABLET, EXTENDED RELEASE ORAL DAILY
Status: DISCONTINUED | OUTPATIENT
Start: 2023-04-15 | End: 2023-04-21

## 2023-04-15 RX ORDER — ACETAMINOPHEN 500 MG
500 TABLET ORAL EVERY 6 HOURS PRN
Status: DISCONTINUED | OUTPATIENT
Start: 2023-04-15 | End: 2023-04-21

## 2023-04-15 RX ORDER — FLUTICASONE PROPIONATE 50 MCG
1 SPRAY, SUSPENSION (ML) NASAL DAILY
Status: DISCONTINUED | OUTPATIENT
Start: 2023-04-15 | End: 2023-04-21

## 2023-04-15 RX ORDER — ATORVASTATIN CALCIUM 20 MG/1
20 TABLET, FILM COATED ORAL NIGHTLY
Status: DISCONTINUED | OUTPATIENT
Start: 2023-04-15 | End: 2023-04-21

## 2023-04-15 RX ORDER — ALBUTEROL SULFATE 90 UG/1
2 INHALANT RESPIRATORY (INHALATION) EVERY 6 HOURS PRN
Status: DISCONTINUED | OUTPATIENT
Start: 2023-04-15 | End: 2023-04-21

## 2023-04-15 RX ORDER — TRAMADOL HYDROCHLORIDE 50 MG/1
50 TABLET ORAL EVERY 12 HOURS PRN
Status: DISCONTINUED | OUTPATIENT
Start: 2023-04-15 | End: 2023-04-21

## 2023-04-15 RX ORDER — NICOTINE POLACRILEX 4 MG
15 LOZENGE BUCCAL
Status: DISCONTINUED | OUTPATIENT
Start: 2023-04-15 | End: 2023-04-21

## 2023-04-15 RX ORDER — CARVEDILOL 12.5 MG/1
25 TABLET ORAL 2 TIMES DAILY WITH MEALS
Status: DISCONTINUED | OUTPATIENT
Start: 2023-04-15 | End: 2023-04-21

## 2023-04-15 NOTE — PLAN OF CARE
Received patient at 0730. Alert and oriented x4. Tele rhythm NSR/ST. O2 saturation 97% on room air. Breath sounds clear. Bed is locked and in low position. Call light and personal belongings in reach. No c/o chest pain or shortness of breath. Pt voiding with no issue. Headache treated with PRN Fioricet. Skin dry and intact. Care plan reviewed, pt verbalizes understanding.        Problem: Diabetes/Glucose Control  Goal: Glucose maintained within prescribed range  Description: INTERVENTIONS:  - Monitor Blood Glucose as ordered  - Assess for signs and symptoms of hyperglycemia and hypoglycemia  - Administer ordered medications to maintain glucose within target range  - Assess barriers to adequate nutritional intake and initiate nutrition consult as needed  - Instruct patient on self management of diabetes  Outcome: Progressing     Problem: Patient/Family Goals  Goal: Patient/Family Long Term Goal  Description: Patient's Long Term Goal: Go home    Interventions:  - Consults to see  - Take medication as prescribed  - Monitor tele  - Monitor labs  - See additional Care Plan goals for specific interventions  Outcome: Progressing  Goal: Patient/Family Short Term Goal  Description: Patient's Short Term Goal: Feel better    Interventions:   - Participate in plan of care  - Follow up with PCP after discharge  - See additional Care Plan goals for specific interventions  Outcome: Progressing     Problem: PAIN - ADULT  Goal: Verbalizes/displays adequate comfort level or patient's stated pain goal  Description: INTERVENTIONS:  - Encourage pt to monitor pain and request assistance  - Assess pain using appropriate pain scale  - Administer analgesics based on type and severity of pain and evaluate response  - Implement non-pharmacological measures as appropriate and evaluate response  - Consider cultural and social influences on pain and pain management  - Manage/alleviate anxiety  - Utilize distraction and/or relaxation techniques  - Monitor for opioid side effects  - Notify MD/LIP if interventions unsuccessful or patient reports new pain  - Anticipate increased pain with activity and pre-medicate as appropriate  Outcome: Progressing

## 2023-04-15 NOTE — ED INITIAL ASSESSMENT (HPI)
Pt to ed with c/o mid and lower abdominal pain. Also reports a headache and feeling short of breath. States his BP tonight was 200/133. States he was amitted a week ago for same complaints.

## 2023-04-15 NOTE — PLAN OF CARE
Patient received alert and oriented x 4. On RA. NSR on tele. BP elevated on arrival to unit, prn Hydralazine given. Complains of headache and shoulder, prn pain medication given as ordered. POC : Consults to see. Fall precautions in place. Call light within reach. [address] was given early per Dr. Pj Herring d/t elevated BP.       Problem: Diabetes/Glucose Control  Goal: Glucose maintained within prescribed range  Description: INTERVENTIONS:  - Monitor Blood Glucose as ordered  - Assess for signs and symptoms of hyperglycemia and hypoglycemia  - Administer ordered medications to maintain glucose within target range  - Assess barriers to adequate nutritional intake and initiate nutrition consult as needed  - Instruct patient on self management of diabetes  Outcome: Progressing     Problem: Patient/Family Goals  Goal: Patient/Family Long Term Goal  Description: Patient's Long Term Goal: Go home    Interventions:  - Consults to see  - Take medication as prescribed  - Monitor tele  - Monitor labs  - See additional Care Plan goals for specific interventions  Outcome: Progressing  Goal: Patient/Family Short Term Goal  Description: Patient's Short Term Goal: Feel better    Interventions:   - Participate in plan of care  - Follow up with PCP after discharge  - See additional Care Plan goals for specific interventions  Outcome: Progressing     Problem: PAIN - ADULT  Goal: Verbalizes/displays adequate comfort level or patient's stated pain goal  Description: INTERVENTIONS:  - Encourage pt to monitor pain and request assistance  - Assess pain using appropriate pain scale  - Administer analgesics based on type and severity of pain and evaluate response  - Implement non-pharmacological measures as appropriate and evaluate response  - Consider cultural and social influences on pain and pain management  - Manage/alleviate anxiety  - Utilize distraction and/or relaxation techniques  - Monitor for opioid side effects  - Notify MD/LIP if interventions unsuccessful or patient reports new pain  - Anticipate increased pain with activity and pre-medicate as appropriate  Outcome: Progressing

## 2023-04-15 NOTE — PHYSICAL THERAPY NOTE
Order received, chart reviewed, spoke to RN and pt. Pt reports being at baseline functional level, pt was admitted for hypertensive emergency and headache, and was evaluated by PT and had not acute PT needs during last admission in late March 2023. Will sign off on PT, pt agrees. Please reconsult PT if pt's mobility status changes.

## 2023-04-15 NOTE — PLAN OF CARE
Patient admitted from  ED via EMT  Oriented to room  Safety precautions initiated  Bed in low position  Call light within reach

## 2023-04-15 NOTE — ED QUICK NOTES
Orders for admission, patient is aware of plan and ready to go upstairs. Any questions, please call ED RN Edmond Mcnally at extension [phone].      Patient Covid vaccination status: Fully vaccinated     COVID Test Ordered in ED: Rapid SARS-CoV-2 by PCR    COVID Suspicion at Admission: Low clinical suspicion for COVID    Running Infusions:  None    Mental Status/LOC at time of transport: A/ox4    Other pertinent information:   CIWA score: N/A   NIH score:  N/A

## 2023-04-16 ENCOUNTER — APPOINTMENT (OUTPATIENT)
Dept: GENERAL RADIOLOGY | Facility: HOSPITAL | Age: 43
End: 2023-04-16
Attending: HOSPITALIST
Payer: MEDICARE

## 2023-04-16 ENCOUNTER — APPOINTMENT (OUTPATIENT)
Dept: ULTRASOUND IMAGING | Facility: HOSPITAL | Age: 43
DRG: 305 | End: 2023-04-16
Attending: INTERNAL MEDICINE
Payer: MEDICARE

## 2023-04-16 ENCOUNTER — APPOINTMENT (OUTPATIENT)
Dept: GENERAL RADIOLOGY | Facility: HOSPITAL | Age: 43
DRG: 305 | End: 2023-04-16
Attending: HOSPITALIST
Payer: MEDICARE

## 2023-04-16 ENCOUNTER — APPOINTMENT (OUTPATIENT)
Dept: ULTRASOUND IMAGING | Facility: HOSPITAL | Age: 43
End: 2023-04-16
Attending: INTERNAL MEDICINE
Payer: MEDICARE

## 2023-04-16 LAB
ALBUMIN SERPL-MCNC: 3.2 G/DL (ref 3.4–5)
ALBUMIN SERPL-MCNC: 3.2 G/DL (ref 3.4–5)
ALBUMIN/GLOB SERPL: 0.8 {RATIO} (ref 1–2)
ALP LIVER SERPL-CCNC: 88 U/L
ALT SERPL-CCNC: 20 U/L
ANION GAP SERPL CALC-SCNC: 6 MMOL/L (ref 0–18)
ANION GAP SERPL CALC-SCNC: 6 MMOL/L (ref 0–18)
AST SERPL-CCNC: 26 U/L (ref 15–37)
BASOPHILS # BLD AUTO: 0.13 X10(3) UL (ref 0–0.2)
BASOPHILS NFR BLD AUTO: 1.5 %
BILIRUB SERPL-MCNC: 0.4 MG/DL (ref 0.1–2)
BILIRUB UR QL STRIP.AUTO: NEGATIVE
BUN BLD-MCNC: 45 MG/DL (ref 7–18)
BUN BLD-MCNC: 45 MG/DL (ref 7–18)
C3 SERPL-MCNC: 120 MG/DL (ref 90–180)
C4 SERPL-MCNC: 40.1 MG/DL (ref 10–40)
CALCIUM BLD-MCNC: 8.9 MG/DL (ref 8.5–10.1)
CALCIUM BLD-MCNC: 8.9 MG/DL (ref 8.5–10.1)
CHLORIDE SERPL-SCNC: 113 MMOL/L (ref 98–112)
CHLORIDE SERPL-SCNC: 113 MMOL/L (ref 98–112)
CLARITY UR REFRACT.AUTO: CLEAR
CO2 SERPL-SCNC: 21 MMOL/L (ref 21–32)
CO2 SERPL-SCNC: 21 MMOL/L (ref 21–32)
COLOR UR AUTO: YELLOW
CREAT BLD-MCNC: 4.28 MG/DL
CREAT BLD-MCNC: 4.28 MG/DL
CREAT UR-SCNC: 133 MG/DL
DEPRECATED HBV CORE AB SER IA-ACNC: 318.7 NG/ML
EOSINOPHIL # BLD AUTO: 0.38 X10(3) UL (ref 0–0.7)
EOSINOPHIL NFR BLD AUTO: 4.4 %
ERYTHROCYTE [DISTWIDTH] IN BLOOD BY AUTOMATED COUNT: 15 %
GFR SERPLBLD BASED ON 1.73 SQ M-ARVRAT: 17 ML/MIN/1.73M2 (ref 60–?)
GFR SERPLBLD BASED ON 1.73 SQ M-ARVRAT: 17 ML/MIN/1.73M2 (ref 60–?)
GLOBULIN PLAS-MCNC: 3.9 G/DL (ref 2.8–4.4)
GLUCOSE BLD-MCNC: 134 MG/DL (ref 70–99)
GLUCOSE BLD-MCNC: 138 MG/DL (ref 70–99)
GLUCOSE BLD-MCNC: 138 MG/DL (ref 70–99)
GLUCOSE BLD-MCNC: 148 MG/DL (ref 70–99)
GLUCOSE BLD-MCNC: 159 MG/DL (ref 70–99)
GLUCOSE BLD-MCNC: 233 MG/DL (ref 70–99)
GLUCOSE UR STRIP.AUTO-MCNC: 50 MG/DL
HCT VFR BLD AUTO: 31.4 %
HGB BLD-MCNC: 10.4 G/DL
IMM GRANULOCYTES # BLD AUTO: 0.02 X10(3) UL (ref 0–1)
IMM GRANULOCYTES NFR BLD: 0.2 %
IRON SATN MFR SERPL: 15 %
IRON SERPL-MCNC: 43 UG/DL
KETONES UR STRIP.AUTO-MCNC: NEGATIVE MG/DL
LEUKOCYTE ESTERASE UR QL STRIP.AUTO: NEGATIVE
LYMPHOCYTES # BLD AUTO: 2.43 X10(3) UL (ref 1–4)
LYMPHOCYTES NFR BLD AUTO: 28.4 %
MAGNESIUM SERPL-MCNC: 2.3 MG/DL (ref 1.6–2.6)
MCH RBC QN AUTO: 31.3 PG (ref 26–34)
MCHC RBC AUTO-ENTMCNC: 33.1 G/DL (ref 31–37)
MCV RBC AUTO: 94.6 FL
MICROALBUMIN UR-MCNC: 161 MG/DL
MICROALBUMIN/CREAT 24H UR-RTO: 1210.5 UG/MG (ref ?–30)
MONOCYTES # BLD AUTO: 0.78 X10(3) UL (ref 0.1–1)
MONOCYTES NFR BLD AUTO: 9.1 %
NEUTROPHILS # BLD AUTO: 4.83 X10 (3) UL (ref 1.5–7.7)
NEUTROPHILS # BLD AUTO: 4.83 X10(3) UL (ref 1.5–7.7)
NEUTROPHILS NFR BLD AUTO: 56.4 %
NITRITE UR QL STRIP.AUTO: NEGATIVE
OSMOLALITY SERPL CALC.SUM OF ELEC: 304 MOSM/KG (ref 275–295)
OSMOLALITY SERPL CALC.SUM OF ELEC: 304 MOSM/KG (ref 275–295)
PH UR STRIP.AUTO: 6 [PH] (ref 5–8)
PHOSPHATE SERPL-MCNC: 4 MG/DL (ref 2.5–4.9)
PLATELET # BLD AUTO: 333 10(3)UL (ref 150–450)
POTASSIUM SERPL-SCNC: 3.8 MMOL/L (ref 3.5–5.1)
POTASSIUM SERPL-SCNC: 3.8 MMOL/L (ref 3.5–5.1)
PROT SERPL-MCNC: 7.1 G/DL (ref 6.4–8.2)
PROT UR STRIP.AUTO-MCNC: 100 MG/DL
PROT UR-MCNC: 213.6 MG/DL
PROT/CREAT UR-RTO: 1.61
RBC # BLD AUTO: 3.32 X10(6)UL
SODIUM SERPL-SCNC: 140 MMOL/L (ref 136–145)
SODIUM SERPL-SCNC: 140 MMOL/L (ref 136–145)
SODIUM SERPL-SCNC: 31 MMOL/L
SP GR UR STRIP.AUTO: 1.01 (ref 1–1.03)
TIBC SERPL-MCNC: 291 UG/DL (ref 240–450)
TRANSFERRIN SERPL-MCNC: 195 MG/DL (ref 200–360)
UROBILINOGEN UR STRIP.AUTO-MCNC: <2 MG/DL
UUN UR-MCNC: 723 MG/DL
WBC # BLD AUTO: 8.6 X10(3) UL (ref 4–11)

## 2023-04-16 PROCEDURE — 81001 URINALYSIS AUTO W/SCOPE: CPT | Performed by: INTERNAL MEDICINE

## 2023-04-16 PROCEDURE — 86038 ANTINUCLEAR ANTIBODIES: CPT | Performed by: INTERNAL MEDICINE

## 2023-04-16 PROCEDURE — 87340 HEPATITIS B SURFACE AG IA: CPT | Performed by: INTERNAL MEDICINE

## 2023-04-16 PROCEDURE — 86160 COMPLEMENT ANTIGEN: CPT | Performed by: INTERNAL MEDICINE

## 2023-04-16 PROCEDURE — 83550 IRON BINDING TEST: CPT | Performed by: INTERNAL MEDICINE

## 2023-04-16 PROCEDURE — 83540 ASSAY OF IRON: CPT | Performed by: INTERNAL MEDICINE

## 2023-04-16 PROCEDURE — 85025 COMPLETE CBC W/AUTO DIFF WBC: CPT | Performed by: HOSPITALIST

## 2023-04-16 PROCEDURE — 86225 DNA ANTIBODY NATIVE: CPT | Performed by: INTERNAL MEDICINE

## 2023-04-16 PROCEDURE — 82570 ASSAY OF URINE CREATININE: CPT | Performed by: INTERNAL MEDICINE

## 2023-04-16 PROCEDURE — 82043 UR ALBUMIN QUANTITATIVE: CPT | Performed by: INTERNAL MEDICINE

## 2023-04-16 PROCEDURE — 83521 IG LIGHT CHAINS FREE EACH: CPT | Performed by: INTERNAL MEDICINE

## 2023-04-16 PROCEDURE — 86706 HEP B SURFACE ANTIBODY: CPT | Performed by: INTERNAL MEDICINE

## 2023-04-16 PROCEDURE — 83735 ASSAY OF MAGNESIUM: CPT | Performed by: HOSPITALIST

## 2023-04-16 PROCEDURE — 74018 RADEX ABDOMEN 1 VIEW: CPT | Performed by: HOSPITALIST

## 2023-04-16 PROCEDURE — 84100 ASSAY OF PHOSPHORUS: CPT | Performed by: HOSPITALIST

## 2023-04-16 PROCEDURE — 84540 ASSAY OF URINE/UREA-N: CPT | Performed by: INTERNAL MEDICINE

## 2023-04-16 PROCEDURE — 80053 COMPREHEN METABOLIC PANEL: CPT | Performed by: HOSPITALIST

## 2023-04-16 PROCEDURE — 86334 IMMUNOFIX E-PHORESIS SERUM: CPT | Performed by: INTERNAL MEDICINE

## 2023-04-16 PROCEDURE — 84165 PROTEIN E-PHORESIS SERUM: CPT | Performed by: INTERNAL MEDICINE

## 2023-04-16 PROCEDURE — 76775 US EXAM ABDO BACK WALL LIM: CPT | Performed by: INTERNAL MEDICINE

## 2023-04-16 PROCEDURE — 93975 VASCULAR STUDY: CPT | Performed by: INTERNAL MEDICINE

## 2023-04-16 PROCEDURE — 86704 HEP B CORE ANTIBODY TOTAL: CPT | Performed by: INTERNAL MEDICINE

## 2023-04-16 PROCEDURE — 82962 GLUCOSE BLOOD TEST: CPT

## 2023-04-16 PROCEDURE — 87389 HIV-1 AG W/HIV-1&-2 AB AG IA: CPT | Performed by: INTERNAL MEDICINE

## 2023-04-16 PROCEDURE — 86803 HEPATITIS C AB TEST: CPT | Performed by: INTERNAL MEDICINE

## 2023-04-16 PROCEDURE — 84156 ASSAY OF PROTEIN URINE: CPT | Performed by: INTERNAL MEDICINE

## 2023-04-16 PROCEDURE — 82728 ASSAY OF FERRITIN: CPT | Performed by: INTERNAL MEDICINE

## 2023-04-16 PROCEDURE — 84300 ASSAY OF URINE SODIUM: CPT | Performed by: INTERNAL MEDICINE

## 2023-04-16 RX ORDER — POLYETHYLENE GLYCOL 3350 17 G/17G
17 POWDER, FOR SOLUTION ORAL DAILY
Status: DISCONTINUED | OUTPATIENT
Start: 2023-04-16 | End: 2023-04-21

## 2023-04-16 RX ORDER — DICYCLOMINE HYDROCHLORIDE 10 MG/1
10 CAPSULE ORAL ONCE AS NEEDED
Status: COMPLETED | OUTPATIENT
Start: 2023-04-16 | End: 2023-04-16

## 2023-04-16 RX ORDER — TRAMADOL HYDROCHLORIDE 50 MG/1
100 TABLET ORAL ONCE
Status: DISCONTINUED | OUTPATIENT
Start: 2023-04-16 | End: 2023-04-16

## 2023-04-16 RX ORDER — BISACODYL 10 MG
10 SUPPOSITORY, RECTAL RECTAL
Status: DISCONTINUED | OUTPATIENT
Start: 2023-04-16 | End: 2023-04-21

## 2023-04-16 RX ORDER — SIMETHICONE 80 MG
80 TABLET,CHEWABLE ORAL
Status: DISCONTINUED | OUTPATIENT
Start: 2023-04-16 | End: 2023-04-21

## 2023-04-16 RX ORDER — MORPHINE SULFATE 2 MG/ML
2 INJECTION, SOLUTION INTRAMUSCULAR; INTRAVENOUS ONCE
Status: COMPLETED | OUTPATIENT
Start: 2023-04-16 | End: 2023-04-16

## 2023-04-16 RX ORDER — ONDANSETRON 2 MG/ML
4 INJECTION INTRAMUSCULAR; INTRAVENOUS EVERY 6 HOURS PRN
Status: DISCONTINUED | OUTPATIENT
Start: 2023-04-16 | End: 2023-04-21

## 2023-04-16 RX ORDER — MORPHINE SULFATE 2 MG/ML
2 INJECTION, SOLUTION INTRAMUSCULAR; INTRAVENOUS ONCE AS NEEDED
Status: COMPLETED | OUTPATIENT
Start: 2023-04-16 | End: 2023-04-16

## 2023-04-16 NOTE — PLAN OF CARE
Patient alert and oriented x4. Up independently. NSR on tele. Maintaining o2 sats >90% on RA. Denies SOB. C/o severe abdominal pain, margie CHO. Pain meds given with relief. Patient continennt of both bowel/bladder. Updated patient on POC, verbalized understanding.     Problem: Diabetes/Glucose Control  Goal: Glucose maintained within prescribed range  Description: INTERVENTIONS:  - Monitor Blood Glucose as ordered  - Assess for signs and symptoms of hyperglycemia and hypoglycemia  - Administer ordered medications to maintain glucose within target range  - Assess barriers to adequate nutritional intake and initiate nutrition consult as needed  - Instruct patient on self management of diabetes  Outcome: Progressing     Problem: Patient/Family Goals  Goal: Patient/Family Long Term Goal  Description: Patient's Long Term Goal: Go home    Interventions:  - Consults to see  - Take medication as prescribed  - Monitor tele  - Monitor labs  - See additional Care Plan goals for specific interventions  Outcome: Progressing  Goal: Patient/Family Short Term Goal  Description: Patient's Short Term Goal: Feel better    Interventions:   - Participate in plan of care  - Follow up with PCP after discharge  - See additional Care Plan goals for specific interventions  Outcome: Progressing     Problem: PAIN - ADULT  Goal: Verbalizes/displays adequate comfort level or patient's stated pain goal  Description: INTERVENTIONS:  - Encourage pt to monitor pain and request assistance  - Assess pain using appropriate pain scale  - Administer analgesics based on type and severity of pain and evaluate response  - Implement non-pharmacological measures as appropriate and evaluate response  - Consider cultural and social influences on pain and pain management  - Manage/alleviate anxiety  - Utilize distraction and/or relaxation techniques  - Monitor for opioid side effects  - Notify MD/LIP if interventions unsuccessful or patient reports new pain  - Anticipate increased pain with activity and pre-medicate as appropriate  Outcome: Progressing

## 2023-04-16 NOTE — PLAN OF CARE
Received patient at 0730. Alert and oriented x4. Tele rhythm NSR/ST. O2 saturation 98% on room air. Breath sounds clear. Bed is locked and in low position. Call light and personal belongings in reach. No c/o chest pain or shortness of breath. Pt voiding with no issue. Pt ambulated in room with no issues. Skin dry and intact. BP more managed this AM. Care plan reviewed, pt verbalizes understanding.        Problem: Diabetes/Glucose Control  Goal: Glucose maintained within prescribed range  Description: INTERVENTIONS:  - Monitor Blood Glucose as ordered  - Assess for signs and symptoms of hyperglycemia and hypoglycemia  - Administer ordered medications to maintain glucose within target range  - Assess barriers to adequate nutritional intake and initiate nutrition consult as needed  - Instruct patient on self management of diabetes  Outcome: Progressing     Problem: Patient/Family Goals  Goal: Patient/Family Long Term Goal  Description: Patient's Long Term Goal: Go home    Interventions:  - Consults to see  - Take medication as prescribed  - Monitor tele  - Monitor labs  - See additional Care Plan goals for specific interventions  Outcome: Progressing  Goal: Patient/Family Short Term Goal  Description: Patient's Short Term Goal: Feel better    Interventions:   - Participate in plan of care  - Follow up with PCP after discharge  - See additional Care Plan goals for specific interventions  Outcome: Progressing     Problem: PAIN - ADULT  Goal: Verbalizes/displays adequate comfort level or patient's stated pain goal  Description: INTERVENTIONS:  - Encourage pt to monitor pain and request assistance  - Assess pain using appropriate pain scale  - Administer analgesics based on type and severity of pain and evaluate response  - Implement non-pharmacological measures as appropriate and evaluate response  - Consider cultural and social influences on pain and pain management  - Manage/alleviate anxiety  - Utilize distraction and/or relaxation techniques  - Monitor for opioid side effects  - Notify MD/LIP if interventions unsuccessful or patient reports new pain  - Anticipate increased pain with activity and pre-medicate as appropriate  Outcome: Progressing

## 2023-04-17 LAB
ALBUMIN SERPL-MCNC: 3.3 G/DL (ref 3.4–5)
ANION GAP SERPL CALC-SCNC: 5 MMOL/L (ref 0–18)
BUN BLD-MCNC: 44 MG/DL (ref 7–18)
CALCIUM BLD-MCNC: 8.6 MG/DL (ref 8.5–10.1)
CHLORIDE SERPL-SCNC: 113 MMOL/L (ref 98–112)
CO2 SERPL-SCNC: 22 MMOL/L (ref 21–32)
CREAT BLD-MCNC: 4.31 MG/DL
GFR SERPLBLD BASED ON 1.73 SQ M-ARVRAT: 16 ML/MIN/1.73M2 (ref 60–?)
GLUCOSE BLD-MCNC: 121 MG/DL (ref 70–99)
GLUCOSE BLD-MCNC: 127 MG/DL (ref 70–99)
GLUCOSE BLD-MCNC: 160 MG/DL (ref 70–99)
GLUCOSE BLD-MCNC: 204 MG/DL (ref 70–99)
GLUCOSE BLD-MCNC: 211 MG/DL (ref 70–99)
HBV CORE AB SERPL QL IA: NONREACTIVE
HBV SURFACE AB SER QL: REACTIVE
HBV SURFACE AB SERPL IA-ACNC: 35.74 MIU/ML
HBV SURFACE AG SER-ACNC: <2 [IU]/L
HBV SURFACE AG SERPL CFM-%: NONREACTIVE
HBV SURFACE AG SERPL QL IA: NONREACTIVE
HCV AB SERPL QL IA: NONREACTIVE
OSMOLALITY SERPL CALC.SUM OF ELEC: 303 MOSM/KG (ref 275–295)
PHOSPHATE SERPL-MCNC: 4 MG/DL (ref 2.5–4.9)
POTASSIUM SERPL-SCNC: 3.8 MMOL/L (ref 3.5–5.1)
SODIUM SERPL-SCNC: 140 MMOL/L (ref 136–145)

## 2023-04-17 PROCEDURE — 83521 IG LIGHT CHAINS FREE EACH: CPT | Performed by: INTERNAL MEDICINE

## 2023-04-17 PROCEDURE — 86334 IMMUNOFIX E-PHORESIS SERUM: CPT | Performed by: INTERNAL MEDICINE

## 2023-04-17 PROCEDURE — 84165 PROTEIN E-PHORESIS SERUM: CPT | Performed by: INTERNAL MEDICINE

## 2023-04-17 PROCEDURE — 82962 GLUCOSE BLOOD TEST: CPT

## 2023-04-17 PROCEDURE — 80069 RENAL FUNCTION PANEL: CPT | Performed by: INTERNAL MEDICINE

## 2023-04-17 RX ORDER — BISACODYL 10 MG
10 SUPPOSITORY, RECTAL RECTAL ONCE
Status: DISCONTINUED | OUTPATIENT
Start: 2023-04-17 | End: 2023-04-21

## 2023-04-17 RX ORDER — MORPHINE SULFATE 2 MG/ML
2 INJECTION, SOLUTION INTRAMUSCULAR; INTRAVENOUS EVERY 6 HOURS
Status: DISCONTINUED | OUTPATIENT
Start: 2023-04-17 | End: 2023-04-17

## 2023-04-17 RX ORDER — TAMSULOSIN HYDROCHLORIDE 0.4 MG/1
0.4 CAPSULE ORAL
Status: DISCONTINUED | OUTPATIENT
Start: 2023-04-18 | End: 2023-04-21

## 2023-04-17 RX ORDER — MORPHINE SULFATE 2 MG/ML
INJECTION, SOLUTION INTRAMUSCULAR; INTRAVENOUS
Status: DISPENSED
Start: 2023-04-17 | End: 2023-04-17

## 2023-04-17 RX ORDER — DICYCLOMINE HYDROCHLORIDE 10 MG/1
10 CAPSULE ORAL
Status: CANCELLED | OUTPATIENT
Start: 2023-04-17

## 2023-04-17 NOTE — CM/SW NOTE
Spoke with Wes Bear from [facility] who confirmed patient is current. Sent referral and MODESTA in aidin. Will keep them updated on discharge. SW/CM to remain available for support and/or discharge planning.     Gerald Haskins, [facility]  Discharge Planner  [phone] Alert and oriented to person, place and time

## 2023-04-17 NOTE — DISCHARGE INSTRUCTIONS
f/u with David Postal (cardiology office) on 4/27/23 at 130PM. [address] [facility], [address] [place] [id number].  [phone].     [address]  Phone  [phone]  XLT  [phone]

## 2023-04-17 NOTE — PLAN OF CARE
Assumed patient care at 0730. Vital signs stable. Patient alert and oriented x 4. Patient still with complaints of abdominal pain. Refused ordered suppository and said he has been having normal bowel movements daily.       Problem: Diabetes/Glucose Control  Goal: Glucose maintained within prescribed range  Description: INTERVENTIONS:  - Monitor Blood Glucose as ordered  - Assess for signs and symptoms of hyperglycemia and hypoglycemia  - Administer ordered medications to maintain glucose within target range  - Assess barriers to adequate nutritional intake and initiate nutrition consult as needed  - Instruct patient on self management of diabetes  Outcome: Progressing     Problem: PAIN - ADULT  Goal: Verbalizes/displays adequate comfort level or patient's stated pain goal  Description: INTERVENTIONS:  - Encourage pt to monitor pain and request assistance  - Assess pain using appropriate pain scale  - Administer analgesics based on type and severity of pain and evaluate response  - Implement non-pharmacological measures as appropriate and evaluate response  - Consider cultural and social influences on pain and pain management  - Manage/alleviate anxiety  - Utilize distraction and/or relaxation techniques  - Monitor for opioid side effects  - Notify MD/LIP if interventions unsuccessful or patient reports new pain  - Anticipate increased pain with activity and pre-medicate as appropriate  Outcome: Progressing

## 2023-04-17 NOTE — PLAN OF CARE
Approx 0200 patient awoke with abdominal pain of 9+, knees tucked to chest, and nauseous. Patient given zofran and received n/o for morphine 2 mg Q6. Patient still uncomfortable in his abdomin but able to extend his legs back down the bed and start to fall back to sleep.

## 2023-04-18 LAB
ALBUMIN SERPL-MCNC: 3.2 G/DL (ref 3.4–5)
ANION GAP SERPL CALC-SCNC: 4 MMOL/L (ref 0–18)
BASOPHILS # BLD AUTO: 0.13 X10(3) UL (ref 0–0.2)
BASOPHILS NFR BLD AUTO: 1.3 %
BUN BLD-MCNC: 42 MG/DL (ref 7–18)
CALCIUM BLD-MCNC: 8.4 MG/DL (ref 8.5–10.1)
CHLORIDE SERPL-SCNC: 114 MMOL/L (ref 98–112)
CO2 SERPL-SCNC: 21 MMOL/L (ref 21–32)
CREAT BLD-MCNC: 4.27 MG/DL
DSDNA IGG SERPL IA-ACNC: 0.9 IU/ML
ENA AB SER QL IA: 0.2 UG/L
ENA AB SER QL IA: NEGATIVE
EOSINOPHIL # BLD AUTO: 0.51 X10(3) UL (ref 0–0.7)
EOSINOPHIL NFR BLD AUTO: 5.2 %
ERYTHROCYTE [DISTWIDTH] IN BLOOD BY AUTOMATED COUNT: 15.2 %
GFR SERPLBLD BASED ON 1.73 SQ M-ARVRAT: 17 ML/MIN/1.73M2 (ref 60–?)
GLUCOSE BLD-MCNC: 106 MG/DL (ref 70–99)
GLUCOSE BLD-MCNC: 124 MG/DL (ref 70–99)
GLUCOSE BLD-MCNC: 171 MG/DL (ref 70–99)
GLUCOSE BLD-MCNC: 177 MG/DL (ref 70–99)
GLUCOSE BLD-MCNC: 246 MG/DL (ref 70–99)
HCT VFR BLD AUTO: 28.9 %
HGB BLD-MCNC: 9.7 G/DL
IMM GRANULOCYTES # BLD AUTO: 0.07 X10(3) UL (ref 0–1)
IMM GRANULOCYTES NFR BLD: 0.7 %
LYMPHOCYTES # BLD AUTO: 2.28 X10(3) UL (ref 1–4)
LYMPHOCYTES NFR BLD AUTO: 23.2 %
MCH RBC QN AUTO: 31.4 PG (ref 26–34)
MCHC RBC AUTO-ENTMCNC: 33.6 G/DL (ref 31–37)
MCV RBC AUTO: 93.5 FL
MONOCYTES # BLD AUTO: 0.91 X10(3) UL (ref 0.1–1)
MONOCYTES NFR BLD AUTO: 9.2 %
NEUTROPHILS # BLD AUTO: 5.94 X10 (3) UL (ref 1.5–7.7)
NEUTROPHILS # BLD AUTO: 5.94 X10(3) UL (ref 1.5–7.7)
NEUTROPHILS NFR BLD AUTO: 60.4 %
OSMOLALITY SERPL CALC.SUM OF ELEC: 299 MOSM/KG (ref 275–295)
PHOSPHATE SERPL-MCNC: 3.5 MG/DL (ref 2.5–4.9)
PLATELET # BLD AUTO: 348 10(3)UL (ref 150–450)
POTASSIUM SERPL-SCNC: 3.8 MMOL/L (ref 3.5–5.1)
RBC # BLD AUTO: 3.09 X10(6)UL
SODIUM SERPL-SCNC: 139 MMOL/L (ref 136–145)
WBC # BLD AUTO: 9.8 X10(3) UL (ref 4–11)

## 2023-04-18 PROCEDURE — 85025 COMPLETE CBC W/AUTO DIFF WBC: CPT | Performed by: HOSPITALIST

## 2023-04-18 PROCEDURE — 82962 GLUCOSE BLOOD TEST: CPT

## 2023-04-18 PROCEDURE — 80069 RENAL FUNCTION PANEL: CPT | Performed by: INTERNAL MEDICINE

## 2023-04-18 NOTE — PLAN OF CARE
Rec'd pt at 0730. A&O x 4. Tele shows NSR. O2 sats adequate on RA. Pt continent, up ad osvaldo. C/O abd pain - Tramadol given earlier this AM, and given Miralax with morning meds. Skin dry and intact. Bed locked and in low position, call light and personal items within reach. Will continue to monitor. POC - Monitor BP & abd pain.      Problem: Diabetes/Glucose Control  Goal: Glucose maintained within prescribed range  Description: INTERVENTIONS:  - Monitor Blood Glucose as ordered  - Assess for signs and symptoms of hyperglycemia and hypoglycemia  - Administer ordered medications to maintain glucose within target range  - Assess barriers to adequate nutritional intake and initiate nutrition consult as needed  - Instruct patient on self management of diabetes  4/18/2023 1023 by Fabian Drummond RN  Outcome: Progressing  4/18/2023 1023 by Fabian Drummond RN  Outcome: Progressing     Problem: Patient/Family Goals  Goal: Patient/Family Long Term Goal  Description: Patient's Long Term Goal: Go home    Interventions:  - Consults to see  - Take medication as prescribed  - Monitor tele  - Monitor labs  - See additional Care Plan goals for specific interventions  4/18/2023 1023 by Fabian Drummond RN  Outcome: Progressing  4/18/2023 1023 by Fabian Drummond RN  Outcome: Progressing  Goal: Patient/Family Short Term Goal  Description: Patient's Short Term Goal: Feel better    Interventions:   - Participate in plan of care  - Follow up with PCP after discharge  - See additional Care Plan goals for specific interventions  4/18/2023 1023 by Fabian Drummond RN  Outcome: Progressing  4/18/2023 1023 by Fabian Drummond RN  Outcome: Progressing     Problem: PAIN - ADULT  Goal: Verbalizes/displays adequate comfort level or patient's stated pain goal  Description: INTERVENTIONS:  - Encourage pt to monitor pain and request assistance  - Assess pain using appropriate pain scale  - Administer analgesics based on type and severity of pain and evaluate response  - Implement non-pharmacological measures as appropriate and evaluate response  - Consider cultural and social influences on pain and pain management  - Manage/alleviate anxiety  - Utilize distraction and/or relaxation techniques  - Monitor for opioid side effects  - Notify MD/LIP if interventions unsuccessful or patient reports new pain  - Anticipate increased pain with activity and pre-medicate as appropriate  4/18/2023 1023 by Hudson Cruz RN  Outcome: Progressing  4/18/2023 1023 by Hudson Cruz RN  Outcome: Progressing

## 2023-04-18 NOTE — PROGRESS NOTES
[facility] Note:  Renal Dose Adjustment for Methylnaltrexone (RELISTOR)    Shanique Jackson has been prescribed Methylnaltrexone (RELISTOR) 18 mg q24h. Estimated Creatinine Clearance: 24.5 mL/min (A) (based on SCr of 4.31 mg/dL (H)). Weight:  116.4 kg    Calculated creatinine clearance is < 60 ml/min, therefore, the dose of Methylnaltrexone (RELISTOR) has been changed to 6 mg per P&T approved protocol. Pharmacy will continue to follow, and if renal function improves, will resume the original order.      Thank you,  Burnice Mortimer, PharmD  4/17/2023 9:50 PM

## 2023-04-18 NOTE — PLAN OF CARE
Pt is a/ox4. On room air. Vitals stable. NSR on tele. C/o abdominal cramping sensation, given prn pain meds and using heat packs. Voiding. No BM. Bowel sounds present. Ambulating in the room. All needs met at this time.          Problem: Diabetes/Glucose Control  Goal: Glucose maintained within prescribed range  Description: INTERVENTIONS:  - Monitor Blood Glucose as ordered  - Assess for signs and symptoms of hyperglycemia and hypoglycemia  - Administer ordered medications to maintain glucose within target range  - Assess barriers to adequate nutritional intake and initiate nutrition consult as needed  - Instruct patient on self management of diabetes  Outcome: Progressing     Problem: Patient/Family Goals  Goal: Patient/Family Long Term Goal  Description: Patient's Long Term Goal: Go home    Interventions:  - Consults to see  - Take medication as prescribed  - Monitor tele  - Monitor labs  - See additional Care Plan goals for specific interventions  Outcome: Progressing  Goal: Patient/Family Short Term Goal  Description: Patient's Short Term Goal: Feel better    Interventions:   - Participate in plan of care  - Follow up with PCP after discharge  - See additional Care Plan goals for specific interventions  Outcome: Progressing     Problem: PAIN - ADULT  Goal: Verbalizes/displays adequate comfort level or patient's stated pain goal  Description: INTERVENTIONS:  - Encourage pt to monitor pain and request assistance  - Assess pain using appropriate pain scale  - Administer analgesics based on type and severity of pain and evaluate response  - Implement non-pharmacological measures as appropriate and evaluate response  - Consider cultural and social influences on pain and pain management  - Manage/alleviate anxiety  - Utilize distraction and/or relaxation techniques  - Monitor for opioid side effects  - Notify MD/LIP if interventions unsuccessful or patient reports new pain  - Anticipate increased pain with activity and pre-medicate as appropriate  Outcome: Progressing

## 2023-04-19 LAB
ALBUMIN SERPL-MCNC: 3.3 G/DL (ref 3.4–5)
ANION GAP SERPL CALC-SCNC: 1 MMOL/L (ref 0–18)
BASOPHILS # BLD AUTO: 0.13 X10(3) UL (ref 0–0.2)
BASOPHILS NFR BLD AUTO: 1.6 %
BUN BLD-MCNC: 42 MG/DL (ref 7–18)
CALCIUM BLD-MCNC: 8.5 MG/DL (ref 8.5–10.1)
CHLORIDE SERPL-SCNC: 116 MMOL/L (ref 98–112)
CO2 SERPL-SCNC: 23 MMOL/L (ref 21–32)
CREAT BLD-MCNC: 3.94 MG/DL
EOSINOPHIL # BLD AUTO: 0.39 X10(3) UL (ref 0–0.7)
EOSINOPHIL NFR BLD AUTO: 4.8 %
ERYTHROCYTE [DISTWIDTH] IN BLOOD BY AUTOMATED COUNT: 15.2 %
GFR SERPLBLD BASED ON 1.73 SQ M-ARVRAT: 18 ML/MIN/1.73M2 (ref 60–?)
GLUCOSE BLD-MCNC: 123 MG/DL (ref 70–99)
GLUCOSE BLD-MCNC: 153 MG/DL (ref 70–99)
GLUCOSE BLD-MCNC: 160 MG/DL (ref 70–99)
GLUCOSE BLD-MCNC: 163 MG/DL (ref 70–99)
GLUCOSE BLD-MCNC: 174 MG/DL (ref 70–99)
HCT VFR BLD AUTO: 32.1 %
HGB BLD-MCNC: 10.5 G/DL
IMM GRANULOCYTES # BLD AUTO: 0.05 X10(3) UL (ref 0–1)
IMM GRANULOCYTES NFR BLD: 0.6 %
LYMPHOCYTES # BLD AUTO: 1.84 X10(3) UL (ref 1–4)
LYMPHOCYTES NFR BLD AUTO: 22.7 %
MCH RBC QN AUTO: 32.1 PG (ref 26–34)
MCHC RBC AUTO-ENTMCNC: 32.7 G/DL (ref 31–37)
MCV RBC AUTO: 98.2 FL
MONOCYTES # BLD AUTO: 0.64 X10(3) UL (ref 0.1–1)
MONOCYTES NFR BLD AUTO: 7.9 %
NEUTROPHILS # BLD AUTO: 5.07 X10 (3) UL (ref 1.5–7.7)
NEUTROPHILS # BLD AUTO: 5.07 X10(3) UL (ref 1.5–7.7)
NEUTROPHILS NFR BLD AUTO: 62.4 %
OSMOLALITY SERPL CALC.SUM OF ELEC: 305 MOSM/KG (ref 275–295)
PHOSPHATE SERPL-MCNC: 3.7 MG/DL (ref 2.5–4.9)
PLATELET # BLD AUTO: 344 10(3)UL (ref 150–450)
POTASSIUM SERPL-SCNC: 4.1 MMOL/L (ref 3.5–5.1)
RBC # BLD AUTO: 3.27 X10(6)UL
SODIUM SERPL-SCNC: 140 MMOL/L (ref 136–145)
WBC # BLD AUTO: 8.1 X10(3) UL (ref 4–11)

## 2023-04-19 PROCEDURE — 80069 RENAL FUNCTION PANEL: CPT | Performed by: INTERNAL MEDICINE

## 2023-04-19 PROCEDURE — 85025 COMPLETE CBC W/AUTO DIFF WBC: CPT | Performed by: HOSPITALIST

## 2023-04-19 PROCEDURE — 82962 GLUCOSE BLOOD TEST: CPT

## 2023-04-19 RX ORDER — DEXTROAMPHETAMINE SACCHARATE, AMPHETAMINE ASPARTATE, DEXTROAMPHETAMINE SULFATE AND AMPHETAMINE SULFATE 2.5; 2.5; 2.5; 2.5 MG/1; MG/1; MG/1; MG/1
20 TABLET ORAL 2 TIMES DAILY
Status: DISCONTINUED | OUTPATIENT
Start: 2023-04-19 | End: 2023-04-21

## 2023-04-19 NOTE — PLAN OF CARE
Rec'd pt at 0730. A&O x 4. Tele shows NSR. O2 sats adequate on RA. Pt continent, up ad osvaldo. C/O abd pain/discomfort, tramadol given this AM by [facility] RN. Given laxative this AM. Skin dry and intact. Bed locked and in low position, call light and personal items within reach. Will continue to monitor.      Problem: Diabetes/Glucose Control  Goal: Glucose maintained within prescribed range  Description: INTERVENTIONS:  - Monitor Blood Glucose as ordered  - Assess for signs and symptoms of hyperglycemia and hypoglycemia  - Administer ordered medications to maintain glucose within target range  - Assess barriers to adequate nutritional intake and initiate nutrition consult as needed  - Instruct patient on self management of diabetes  Outcome: Progressing     Problem: Patient/Family Goals  Goal: Patient/Family Long Term Goal  Description: Patient's Long Term Goal: Go home    Interventions:  - Consults to see  - Take medication as prescribed  - Monitor tele  - Monitor labs  - See additional Care Plan goals for specific interventions  Outcome: Progressing  Goal: Patient/Family Short Term Goal  Description: Patient's Short Term Goal: Feel better    Interventions:   - Participate in plan of care  - Follow up with PCP after discharge  - See additional Care Plan goals for specific interventions  Outcome: Progressing     Problem: PAIN - ADULT  Goal: Verbalizes/displays adequate comfort level or patient's stated pain goal  Description: INTERVENTIONS:  - Encourage pt to monitor pain and request assistance  - Assess pain using appropriate pain scale  - Administer analgesics based on type and severity of pain and evaluate response  - Implement non-pharmacological measures as appropriate and evaluate response  - Consider cultural and social influences on pain and pain management  - Manage/alleviate anxiety  - Utilize distraction and/or relaxation techniques  - Monitor for opioid side effects  - Notify MD/LIP if interventions unsuccessful or patient reports new pain  - Anticipate increased pain with activity and pre-medicate as appropriate  Outcome: Progressing

## 2023-04-19 NOTE — PLAN OF CARE
A/ox4. Room air. Vitals stable. NSR on tele. Given prn pain medication for abdominal pain. Pt ordered food to room overnight. Pt is voiding. Per patient, had 2 small formed bowel movements this am but flushed before rn could see. Pt self administered suppository. All needs met at this time.       Problem: Diabetes/Glucose Control  Goal: Glucose maintained within prescribed range  Description: INTERVENTIONS:  - Monitor Blood Glucose as ordered  - Assess for signs and symptoms of hyperglycemia and hypoglycemia  - Administer ordered medications to maintain glucose within target range  - Assess barriers to adequate nutritional intake and initiate nutrition consult as needed  - Instruct patient on self management of diabetes  Outcome: Progressing     Problem: Patient/Family Goals  Goal: Patient/Family Long Term Goal  Description: Patient's Long Term Goal: Go home    Interventions:  - Consults to see  - Take medication as prescribed  - Monitor tele  - Monitor labs  - See additional Care Plan goals for specific interventions  Outcome: Progressing  Goal: Patient/Family Short Term Goal  Description: Patient's Short Term Goal: Feel better    Interventions:   - Participate in plan of care  - Follow up with PCP after discharge  - See additional Care Plan goals for specific interventions  Outcome: Progressing     Problem: PAIN - ADULT  Goal: Verbalizes/displays adequate comfort level or patient's stated pain goal  Description: INTERVENTIONS:  - Encourage pt to monitor pain and request assistance  - Assess pain using appropriate pain scale  - Administer analgesics based on type and severity of pain and evaluate response  - Implement non-pharmacological measures as appropriate and evaluate response  - Consider cultural and social influences on pain and pain management  - Manage/alleviate anxiety  - Utilize distraction and/or relaxation techniques  - Monitor for opioid side effects  - Notify MD/LIP if interventions unsuccessful or patient reports new pain  - Anticipate increased pain with activity and pre-medicate as appropriate  Outcome: Progressing

## 2023-04-20 LAB
ALBUMIN SERPL ELPH-MCNC: 3.88 G/DL (ref 3.75–5.21)
ALBUMIN SERPL-MCNC: 3.5 G/DL (ref 3.4–5)
ALBUMIN/GLOB SERPL: 1.33 {RATIO} (ref 1–2)
ALPHA1 GLOB SERPL ELPH-MCNC: 0.36 G/DL (ref 0.19–0.46)
ALPHA2 GLOB SERPL ELPH-MCNC: 0.54 G/DL (ref 0.48–1.05)
ANION GAP SERPL CALC-SCNC: 7 MMOL/L (ref 0–18)
B-GLOBULIN SERPL ELPH-MCNC: 0.76 G/DL (ref 0.68–1.23)
BASOPHILS # BLD AUTO: 0.18 X10(3) UL (ref 0–0.2)
BASOPHILS NFR BLD AUTO: 1.8 %
BUN BLD-MCNC: 38 MG/DL (ref 7–18)
CALCIUM BLD-MCNC: 8.6 MG/DL (ref 8.5–10.1)
CHLORIDE SERPL-SCNC: 116 MMOL/L (ref 98–112)
CO2 SERPL-SCNC: 19 MMOL/L (ref 21–32)
CREAT BLD-MCNC: 3.54 MG/DL
EOSINOPHIL # BLD AUTO: 0.6 X10(3) UL (ref 0–0.7)
EOSINOPHIL NFR BLD AUTO: 5.9 %
ERYTHROCYTE [DISTWIDTH] IN BLOOD BY AUTOMATED COUNT: 15.5 %
GAMMA GLOB SERPL ELPH-MCNC: 1.26 G/DL (ref 0.62–1.7)
GFR SERPLBLD BASED ON 1.73 SQ M-ARVRAT: 21 ML/MIN/1.73M2 (ref 60–?)
GLUCOSE BLD-MCNC: 101 MG/DL (ref 70–99)
GLUCOSE BLD-MCNC: 135 MG/DL (ref 70–99)
GLUCOSE BLD-MCNC: 150 MG/DL (ref 70–99)
GLUCOSE BLD-MCNC: 166 MG/DL (ref 70–99)
GLUCOSE BLD-MCNC: 210 MG/DL (ref 70–99)
HCT VFR BLD AUTO: 32 %
HGB BLD-MCNC: 10.6 G/DL
IMM GRANULOCYTES # BLD AUTO: 0.06 X10(3) UL (ref 0–1)
IMM GRANULOCYTES NFR BLD: 0.6 %
KAPPA LC FREE SER-MCNC: 8.2 MG/DL (ref 0.33–1.94)
KAPPA LC FREE/LAMBDA FREE SER NEPH: 1.23 {RATIO} (ref 0.26–1.65)
LAMBDA LC FREE SERPL-MCNC: 6.64 MG/DL (ref 0.57–2.63)
LYMPHOCYTES # BLD AUTO: 2.1 X10(3) UL (ref 1–4)
LYMPHOCYTES NFR BLD AUTO: 20.5 %
M PROTEIN 1 SERPL ELPH-MCNC: 0.25 G/DL (ref ?–0)
MCH RBC QN AUTO: 30.8 PG (ref 26–34)
MCHC RBC AUTO-ENTMCNC: 33.1 G/DL (ref 31–37)
MCV RBC AUTO: 93 FL
MONOCYTES # BLD AUTO: 0.94 X10(3) UL (ref 0.1–1)
MONOCYTES NFR BLD AUTO: 9.2 %
NEUTROPHILS # BLD AUTO: 6.35 X10 (3) UL (ref 1.5–7.7)
NEUTROPHILS # BLD AUTO: 6.35 X10(3) UL (ref 1.5–7.7)
NEUTROPHILS NFR BLD AUTO: 62 %
OSMOLALITY SERPL CALC.SUM OF ELEC: 303 MOSM/KG (ref 275–295)
PHOSPHATE SERPL-MCNC: 3.9 MG/DL (ref 2.5–4.9)
PLATELET # BLD AUTO: 330 10(3)UL (ref 150–450)
POTASSIUM SERPL-SCNC: 3.8 MMOL/L (ref 3.5–5.1)
PROT SERPL-MCNC: 6.8 G/DL (ref 6.4–8.2)
RBC # BLD AUTO: 3.44 X10(6)UL
SODIUM SERPL-SCNC: 142 MMOL/L (ref 136–145)
WBC # BLD AUTO: 10.2 X10(3) UL (ref 4–11)

## 2023-04-20 PROCEDURE — 85025 COMPLETE CBC W/AUTO DIFF WBC: CPT | Performed by: HOSPITALIST

## 2023-04-20 PROCEDURE — 80069 RENAL FUNCTION PANEL: CPT | Performed by: INTERNAL MEDICINE

## 2023-04-20 PROCEDURE — 82962 GLUCOSE BLOOD TEST: CPT

## 2023-04-20 RX ORDER — EPLERENONE 25 MG/1
12.5 TABLET ORAL DAILY
Status: DISCONTINUED | OUTPATIENT
Start: 2023-04-20 | End: 2023-04-21

## 2023-04-20 RX ORDER — LISDEXAMFETAMINE DIMESYLATE 50 MG/1
50 CAPSULE ORAL EVERY MORNING
Qty: 30 CAPSULE | Refills: 0 | Status: ON HOLD | OUTPATIENT
Start: 2023-04-20 | End: 2023-05-23

## 2023-04-20 RX ORDER — NIFEDIPINE 30 MG/1
30 TABLET, EXTENDED RELEASE ORAL DAILY
Status: DISCONTINUED | OUTPATIENT
Start: 2023-04-20 | End: 2023-04-21

## 2023-04-20 NOTE — PLAN OF CARE
Patient Aox4. Sating at 96% on room air. Lungs diminished. SR on tele monitor. Complaints of pain in RUE Tramadol and ice pack given to patient. Patient hypertensive. Morning meds given. Recheck BP still high after morning meds given. Inspra added. Will recheck in one hour. Patient up to chair. Up ad osvaldo. Call light within reach.              Problem: Diabetes/Glucose Control  Goal: Glucose maintained within prescribed range  Description: INTERVENTIONS:  - Monitor Blood Glucose as ordered  - Assess for signs and symptoms of hyperglycemia and hypoglycemia  - Administer ordered medications to maintain glucose within target range  - Assess barriers to adequate nutritional intake and initiate nutrition consult as needed  - Instruct patient on self management of diabetes  Outcome: Progressing     Problem: Patient/Family Goals  Goal: Patient/Family Long Term Goal  Description: Patient's Long Term Goal: Go home    Interventions:  - Consults to see  - Take medication as prescribed  - Monitor tele  - Monitor labs  - See additional Care Plan goals for specific interventions  Outcome: Progressing  Goal: Patient/Family Short Term Goal  Description: Patient's Short Term Goal: Feel better    Interventions:   - Participate in plan of care  - Follow up with PCP after discharge  - See additional Care Plan goals for specific interventions  Outcome: Progressing     Problem: PAIN - ADULT  Goal: Verbalizes/displays adequate comfort level or patient's stated pain goal  Description: INTERVENTIONS:  - Encourage pt to monitor pain and request assistance  - Assess pain using appropriate pain scale  - Administer analgesics based on type and severity of pain and evaluate response  - Implement non-pharmacological measures as appropriate and evaluate response  - Consider cultural and social influences on pain and pain management  - Manage/alleviate anxiety  - Utilize distraction and/or relaxation techniques  - Monitor for opioid side effects  - Notify MD/LIP if interventions unsuccessful or patient reports new pain  - Anticipate increased pain with activity and pre-medicate as appropriate  Outcome: Progressing

## 2023-04-20 NOTE — CM/SW NOTE
04/20/23 1300   CM/SW Referral Data   Referral Source    Reason for Referral Readmission   Informant Patient   Readmission Assessment   Factors that patient feels contributed to this readmission Acute/Chronic Clinical Presentation   Pt's living situation prior to admission? Home with family   Pt's level of independence at discharge? No assist/independent (minimal)   Pt. received education on diagnoses at time of discharge? Yes   Did any new symptoms or issues develop after you were discharged? Yes  (abdominal pain, hypertensive emergency)   Did you understand your discharge instructions? Yes   Were medications taken as indicated on discharge instructions? Yes   Was pt. discharged w/out services? No   Patient Info   Patient's Current Mental Status at Time of Assessment Alert;Oriented   Patient lives with Parent(s)   Patient Status Prior to Admission   Independent with ADLs and Mobility Yes   Services in place prior to admission [address] [facility] [address]   Discharge Needs   Anticipated D/C needs Home health care   Choice of Post-Acute Provider   Informed patient of right to choose their preferred provider Yes     CM self referred to case for discharge planning. Pt is a 37year old male with history of CAD, CKD 4, repeated admissions for hypertensive urgency/emergency who is admitted with abdominal pain, shortness of breath, hypertension. Pt with recent admissions 3/23-3/27/23 for abdominal pain, ROBERT and hypertension  and 4/5-4/9/2023 for NSTEMI, hypertensive urgency. Pt with recent hx ESRD on dialysis 12/2022-2/2023  discontinued in 2/2023 due to improved renal function. Met with patient at the bedside for eval.  He confirms he is current with [facility] nursing and that he takes all medications as ordered. Noted that pt was started on Inspra last year and now starting again.   Pt reports he was able to get medication covered, but that it was discontinued and now restarting. Pt reports improvement in abdominal and shoulder pain. Cardiology following and medications changed. / to remain available for support and/or discharge planning.      Mariza Portillo MBA MSN, RN CTL/  [id number]

## 2023-04-20 NOTE — PROGRESS NOTES
Pt is a/ox4. On room air. Vitals stable. NSR on tele. No complaints of abdominal pain overnight. Voiding. No BMs. Bowel sounds present. Ambulating in the room. All needs met at this time.

## 2023-04-20 NOTE — CM/SW NOTE
Sent clinical updates to [facility] in aidin. SW/CM to remain available for support and/or discharge planning.     JOSH Hopkins  Discharge Planner  [phone]

## 2023-04-21 VITALS
BODY MASS INDEX: 34.01 KG/M2 | WEIGHT: 256.63 LBS | SYSTOLIC BLOOD PRESSURE: 156 MMHG | OXYGEN SATURATION: 96 % | HEART RATE: 94 BPM | HEIGHT: 73 IN | TEMPERATURE: 98 F | RESPIRATION RATE: 16 BRPM | DIASTOLIC BLOOD PRESSURE: 90 MMHG

## 2023-04-21 LAB
ALBUMIN SERPL-MCNC: 3.5 G/DL (ref 3.4–5)
ANION GAP SERPL CALC-SCNC: 3 MMOL/L (ref 0–18)
BASOPHILS # BLD AUTO: 0.14 X10(3) UL (ref 0–0.2)
BASOPHILS NFR BLD AUTO: 1.4 %
BUN BLD-MCNC: 41 MG/DL (ref 7–18)
CALCIUM BLD-MCNC: 8.8 MG/DL (ref 8.5–10.1)
CHLORIDE SERPL-SCNC: 115 MMOL/L (ref 98–112)
CO2 SERPL-SCNC: 21 MMOL/L (ref 21–32)
CREAT BLD-MCNC: 3.72 MG/DL
EOSINOPHIL # BLD AUTO: 0.54 X10(3) UL (ref 0–0.7)
EOSINOPHIL NFR BLD AUTO: 5.4 %
ERYTHROCYTE [DISTWIDTH] IN BLOOD BY AUTOMATED COUNT: 15.1 %
GFR SERPLBLD BASED ON 1.73 SQ M-ARVRAT: 20 ML/MIN/1.73M2 (ref 60–?)
GLUCOSE BLD-MCNC: 122 MG/DL (ref 70–99)
GLUCOSE BLD-MCNC: 137 MG/DL (ref 70–99)
GLUCOSE BLD-MCNC: 164 MG/DL (ref 70–99)
HCT VFR BLD AUTO: 33.1 %
HGB BLD-MCNC: 10.9 G/DL
IMM GRANULOCYTES # BLD AUTO: 0.04 X10(3) UL (ref 0–1)
IMM GRANULOCYTES NFR BLD: 0.4 %
LYMPHOCYTES # BLD AUTO: 2.01 X10(3) UL (ref 1–4)
LYMPHOCYTES NFR BLD AUTO: 20.1 %
MCH RBC QN AUTO: 30.7 PG (ref 26–34)
MCHC RBC AUTO-ENTMCNC: 32.9 G/DL (ref 31–37)
MCV RBC AUTO: 93.2 FL
MONOCYTES # BLD AUTO: 0.94 X10(3) UL (ref 0.1–1)
MONOCYTES NFR BLD AUTO: 9.4 %
NEUTROPHILS # BLD AUTO: 6.31 X10 (3) UL (ref 1.5–7.7)
NEUTROPHILS # BLD AUTO: 6.31 X10(3) UL (ref 1.5–7.7)
NEUTROPHILS NFR BLD AUTO: 63.3 %
OSMOLALITY SERPL CALC.SUM OF ELEC: 299 MOSM/KG (ref 275–295)
PHOSPHATE SERPL-MCNC: 3.1 MG/DL (ref 2.5–4.9)
PLATELET # BLD AUTO: 326 10(3)UL (ref 150–450)
POTASSIUM SERPL-SCNC: 4 MMOL/L (ref 3.5–5.1)
RBC # BLD AUTO: 3.55 X10(6)UL
SODIUM SERPL-SCNC: 139 MMOL/L (ref 136–145)
WBC # BLD AUTO: 10 X10(3) UL (ref 4–11)

## 2023-04-21 PROCEDURE — 82962 GLUCOSE BLOOD TEST: CPT

## 2023-04-21 PROCEDURE — 85025 COMPLETE CBC W/AUTO DIFF WBC: CPT | Performed by: HOSPITALIST

## 2023-04-21 PROCEDURE — 80069 RENAL FUNCTION PANEL: CPT | Performed by: INTERNAL MEDICINE

## 2023-04-21 RX ORDER — NIFEDIPINE 30 MG
30 TABLET, EXTENDED RELEASE ORAL EVERY EVENING
Qty: 90 TABLET | Refills: 3 | Status: SHIPPED | OUTPATIENT
Start: 2023-04-21 | End: 2023-05-09

## 2023-04-21 RX ORDER — BISACODYL 10 MG
10 SUPPOSITORY, RECTAL RECTAL
Qty: 10 SUPPOSITORY | Refills: 1 | Status: SHIPPED | OUTPATIENT
Start: 2023-04-21 | End: 2023-05-09

## 2023-04-21 RX ORDER — ISOSORBIDE MONONITRATE 30 MG/1
30 TABLET, EXTENDED RELEASE ORAL DAILY
Qty: 90 TABLET | Refills: 3 | Status: SHIPPED | OUTPATIENT
Start: 2023-04-21 | End: 2023-05-09

## 2023-04-21 RX ORDER — LAMOTRIGINE 150 MG/1
150 TABLET ORAL DAILY
Qty: 30 TABLET | Refills: 0 | Status: ON HOLD | OUTPATIENT
Start: 2023-04-21 | End: 2023-11-14

## 2023-04-21 NOTE — PLAN OF CARE
Assumed care of patient at [facility]. Pt A/Ox 4. O2 sats maintained on room air. NSR on tele. Last BM 4/19. Voiding without difficulty. Pt reports chronic shoulder pain, managed with PRN Tramadol. Pt up ad osvaldo, declining bed alarm for the night. Pt updated on plan of care. Care needs met. Bed in lowest position, Call light within reach.      POC: monitor BP, pain control       Problem: Diabetes/Glucose Control  Goal: Glucose maintained within prescribed range  Description: INTERVENTIONS:  - Monitor Blood Glucose as ordered  - Assess for signs and symptoms of hyperglycemia and hypoglycemia  - Administer ordered medications to maintain glucose within target range  - Assess barriers to adequate nutritional intake and initiate nutrition consult as needed  - Instruct patient on self management of diabetes  Outcome: Progressing     Problem: Patient/Family Goals  Goal: Patient/Family Long Term Goal  Description: Patient's Long Term Goal: Go home    Interventions:  - Consults to see  - Take medication as prescribed  - Monitor tele  - Monitor labs  - See additional Care Plan goals for specific interventions  Outcome: Progressing  Goal: Patient/Family Short Term Goal  Description: Patient's Short Term Goal: Feel better    Interventions:   - Participate in plan of care  - Follow up with PCP after discharge  - See additional Care Plan goals for specific interventions  Outcome: Progressing     Problem: PAIN - ADULT  Goal: Verbalizes/displays adequate comfort level or patient's stated pain goal  Description: INTERVENTIONS:  - Encourage pt to monitor pain and request assistance  - Assess pain using appropriate pain scale  - Administer analgesics based on type and severity of pain and evaluate response  - Implement non-pharmacological measures as appropriate and evaluate response  - Consider cultural and social influences on pain and pain management  - Manage/alleviate anxiety  - Utilize distraction and/or relaxation techniques  - Monitor for opioid side effects  - Notify MD/LIP if interventions unsuccessful or patient reports new pain  - Anticipate increased pain with activity and pre-medicate as appropriate  Outcome: Progressing

## 2023-04-21 NOTE — PLAN OF CARE
Patient with orders for discharge. Instructions gone over with patient who vocalizes understanding. Paper script sent with patient with instructions on how to fill it. IV taken out. Patient taken down to [place] parking for drop off.

## 2023-04-21 NOTE — PLAN OF CARE
Patient Aox4. Sating at 96% on room air. SR on tele monitor. Continent of bowels and bladder. Complaints of pain in right shoulder. Pain rated as 7/10. Tramadol given per orders. IV flushing appropriately without pain. Patient up ad osvaldo. Hypertensive with BP medications due. Will continue to monitor. Call light within reach. Bed in lowest position. Patient in chair.              Problem: Diabetes/Glucose Control  Goal: Glucose maintained within prescribed range  Description: INTERVENTIONS:  - Monitor Blood Glucose as ordered  - Assess for signs and symptoms of hyperglycemia and hypoglycemia  - Administer ordered medications to maintain glucose within target range  - Assess barriers to adequate nutritional intake and initiate nutrition consult as needed  - Instruct patient on self management of diabetes  Outcome: Progressing     Problem: Patient/Family Goals  Goal: Patient/Family Long Term Goal  Description: Patient's Long Term Goal: Go home    Interventions:  - Consults to see  - Take medication as prescribed  - Monitor tele  - Monitor labs  - See additional Care Plan goals for specific interventions  Outcome: Progressing  Goal: Patient/Family Short Term Goal  Description: Patient's Short Term Goal: Feel better    Interventions:   - Participate in plan of care  - Follow up with PCP after discharge  - See additional Care Plan goals for specific interventions  Outcome: Progressing     Problem: PAIN - ADULT  Goal: Verbalizes/displays adequate comfort level or patient's stated pain goal  Description: INTERVENTIONS:  - Encourage pt to monitor pain and request assistance  - Assess pain using appropriate pain scale  - Administer analgesics based on type and severity of pain and evaluate response  - Implement non-pharmacological measures as appropriate and evaluate response  - Consider cultural and social influences on pain and pain management  - Manage/alleviate anxiety  - Utilize distraction and/or relaxation techniques  - Monitor for opioid side effects  - Notify MD/LIP if interventions unsuccessful or patient reports new pain  - Anticipate increased pain with activity and pre-medicate as appropriate  Outcome: Progressing

## 2023-04-21 NOTE — PAYOR COMM NOTE
--------------  4/21 CONTINUED STAY REVIEW    Payor: [employer] #:  [id number]  Authorization Number: [id number]      Plan for DC today.

## 2023-05-02 ENCOUNTER — APPOINTMENT (OUTPATIENT)
Dept: GENERAL RADIOLOGY | Age: 43
End: 2023-05-02
Attending: EMERGENCY MEDICINE
Payer: MEDICAID

## 2023-05-02 ENCOUNTER — HOSPITAL ENCOUNTER (INPATIENT)
Facility: HOSPITAL | Age: 43
LOS: 7 days | Discharge: HOME | End: 2023-05-09
Attending: EMERGENCY MEDICINE | Admitting: INTERNAL MEDICINE
Payer: MEDICAID

## 2023-05-02 DIAGNOSIS — J18.9 HCAP (HEALTHCARE-ASSOCIATED PNEUMONIA): Primary | ICD-10-CM

## 2023-05-02 DIAGNOSIS — R77.8 ELEVATED TROPONIN: ICD-10-CM

## 2023-05-02 DIAGNOSIS — N18.9 CHRONIC KIDNEY DISEASE, UNSPECIFIED CKD STAGE: ICD-10-CM

## 2023-05-02 LAB
ALBUMIN SERPL-MCNC: 3.5 G/DL (ref 3.4–5)
ALBUMIN/GLOB SERPL: 0.8 {RATIO} (ref 1–2)
ALP LIVER SERPL-CCNC: 103 U/L
ALT SERPL-CCNC: 33 U/L
ANION GAP SERPL CALC-SCNC: 8 MMOL/L (ref 0–18)
AST SERPL-CCNC: 82 U/L (ref 15–37)
ATRIAL RATE: 95 BPM
BASOPHILS # BLD AUTO: 0.12 X10(3) UL (ref 0–0.2)
BASOPHILS NFR BLD AUTO: 1.4 %
BILIRUB SERPL-MCNC: 0.7 MG/DL (ref 0.1–2)
BUN BLD-MCNC: 42 MG/DL (ref 7–18)
CALCIUM BLD-MCNC: 8.9 MG/DL (ref 8.5–10.1)
CHLORIDE SERPL-SCNC: 109 MMOL/L (ref 98–112)
CHOLEST SERPL-MCNC: 185 MG/DL (ref ?–200)
CO2 SERPL-SCNC: 22 MMOL/L (ref 21–32)
CREAT BLD-MCNC: 4.48 MG/DL
EOSINOPHIL # BLD AUTO: 0.68 X10(3) UL (ref 0–0.7)
EOSINOPHIL NFR BLD AUTO: 8 %
ERYTHROCYTE [DISTWIDTH] IN BLOOD BY AUTOMATED COUNT: 15.1 %
GFR SERPLBLD BASED ON 1.73 SQ M-ARVRAT: 16 ML/MIN/1.73M2 (ref 60–?)
GLOBULIN PLAS-MCNC: 4.2 G/DL (ref 2.8–4.4)
GLUCOSE BLD-MCNC: 155 MG/DL (ref 70–99)
GLUCOSE BLD-MCNC: 197 MG/DL (ref 70–99)
HCT VFR BLD AUTO: 33.6 %
HDLC SERPL-MCNC: 55 MG/DL (ref 40–59)
HGB BLD-MCNC: 11.3 G/DL
IMM GRANULOCYTES # BLD AUTO: 0.03 X10(3) UL (ref 0–1)
IMM GRANULOCYTES NFR BLD: 0.4 %
LACTATE SERPL-SCNC: 1.1 MMOL/L (ref 0.4–2)
LDLC SERPL CALC-MCNC: 94 MG/DL (ref ?–100)
LYMPHOCYTES # BLD AUTO: 1.67 X10(3) UL (ref 1–4)
LYMPHOCYTES NFR BLD AUTO: 19.6 %
MCH RBC QN AUTO: 31.4 PG (ref 26–34)
MCHC RBC AUTO-ENTMCNC: 33.6 G/DL (ref 31–37)
MCV RBC AUTO: 93.3 FL
MONOCYTES # BLD AUTO: 0.85 X10(3) UL (ref 0.1–1)
MONOCYTES NFR BLD AUTO: 10 %
NEUTROPHILS # BLD AUTO: 5.18 X10 (3) UL (ref 1.5–7.7)
NEUTROPHILS # BLD AUTO: 5.18 X10(3) UL (ref 1.5–7.7)
NEUTROPHILS NFR BLD AUTO: 60.6 %
NONHDLC SERPL-MCNC: 130 MG/DL (ref ?–130)
NT-PROBNP SERPL-MCNC: ABNORMAL PG/ML (ref ?–125)
OSMOLALITY SERPL CALC.SUM OF ELEC: 304 MOSM/KG (ref 275–295)
P AXIS: 19 DEGREES
P-R INTERVAL: 186 MS
PLATELET # BLD AUTO: 259 10(3)UL (ref 150–450)
POTASSIUM SERPL-SCNC: 4.5 MMOL/L (ref 3.5–5.1)
PROCALCITONIN SERPL-MCNC: 0.31 NG/ML (ref ?–0.16)
PROCALCITONIN SERPL-MCNC: 0.31 NG/ML (ref ?–0.16)
PROT SERPL-MCNC: 7.7 G/DL (ref 6.4–8.2)
Q-T INTERVAL: 374 MS
QRS DURATION: 84 MS
QTC CALCULATION (BEZET): 469 MS
R AXIS: 20 DEGREES
RBC # BLD AUTO: 3.6 X10(6)UL
SARS-COV-2 RNA RESP QL NAA+PROBE: NOT DETECTED
SODIUM SERPL-SCNC: 139 MMOL/L (ref 136–145)
T AXIS: 6 DEGREES
TRIGL SERPL-MCNC: 213 MG/DL (ref 30–149)
TROPONIN I HIGH SENSITIVITY: 647 NG/L
TROPONIN I HIGH SENSITIVITY: 866 NG/L
VENTRICULAR RATE: 95 BPM
VLDLC SERPL CALC-MCNC: 35 MG/DL (ref 0–30)
WBC # BLD AUTO: 8.5 X10(3) UL (ref 4–11)

## 2023-05-02 PROCEDURE — 96365 THER/PROPH/DIAG IV INF INIT: CPT

## 2023-05-02 PROCEDURE — 85025 COMPLETE CBC W/AUTO DIFF WBC: CPT | Performed by: EMERGENCY MEDICINE

## 2023-05-02 PROCEDURE — 71045 X-RAY EXAM CHEST 1 VIEW: CPT | Performed by: EMERGENCY MEDICINE

## 2023-05-02 PROCEDURE — 80053 COMPREHEN METABOLIC PANEL: CPT | Performed by: EMERGENCY MEDICINE

## 2023-05-02 PROCEDURE — 99285 EMERGENCY DEPT VISIT HI MDM: CPT

## 2023-05-02 PROCEDURE — 84484 ASSAY OF TROPONIN QUANT: CPT | Performed by: HOSPITALIST

## 2023-05-02 PROCEDURE — 87040 BLOOD CULTURE FOR BACTERIA: CPT | Performed by: EMERGENCY MEDICINE

## 2023-05-02 PROCEDURE — 84484 ASSAY OF TROPONIN QUANT: CPT | Performed by: EMERGENCY MEDICINE

## 2023-05-02 PROCEDURE — 82962 GLUCOSE BLOOD TEST: CPT

## 2023-05-02 PROCEDURE — 80061 LIPID PANEL: CPT | Performed by: EMERGENCY MEDICINE

## 2023-05-02 PROCEDURE — 83605 ASSAY OF LACTIC ACID: CPT | Performed by: EMERGENCY MEDICINE

## 2023-05-02 PROCEDURE — 93010 ELECTROCARDIOGRAM REPORT: CPT

## 2023-05-02 PROCEDURE — 84145 PROCALCITONIN (PCT): CPT | Performed by: EMERGENCY MEDICINE

## 2023-05-02 PROCEDURE — 83880 ASSAY OF NATRIURETIC PEPTIDE: CPT | Performed by: EMERGENCY MEDICINE

## 2023-05-02 PROCEDURE — 84145 PROCALCITONIN (PCT): CPT | Performed by: HOSPITALIST

## 2023-05-02 PROCEDURE — 93005 ELECTROCARDIOGRAM TRACING: CPT

## 2023-05-02 PROCEDURE — 36415 COLL VENOUS BLD VENIPUNCTURE: CPT

## 2023-05-02 RX ORDER — FLUOXETINE HYDROCHLORIDE 20 MG/1
40 CAPSULE ORAL DAILY
Status: DISCONTINUED | OUTPATIENT
Start: 2023-05-03 | End: 2023-05-09

## 2023-05-02 RX ORDER — DEXTROAMPHETAMINE SACCHARATE, AMPHETAMINE ASPARTATE, DEXTROAMPHETAMINE SULFATE AND AMPHETAMINE SULFATE 2.5; 2.5; 2.5; 2.5 MG/1; MG/1; MG/1; MG/1
10 TABLET ORAL
Status: DISCONTINUED | OUTPATIENT
Start: 2023-05-03 | End: 2023-05-09

## 2023-05-02 RX ORDER — TRAMADOL HYDROCHLORIDE 50 MG/1
50 TABLET ORAL EVERY 12 HOURS PRN
Status: DISCONTINUED | OUTPATIENT
Start: 2023-05-02 | End: 2023-05-09

## 2023-05-02 RX ORDER — FENOFIBRATE 134 MG/1
134 CAPSULE ORAL NIGHTLY
Status: DISCONTINUED | OUTPATIENT
Start: 2023-05-02 | End: 2023-05-09

## 2023-05-02 RX ORDER — DICYCLOMINE HCL 20 MG
20 TABLET ORAL 3 TIMES DAILY
Status: DISCONTINUED | OUTPATIENT
Start: 2023-05-02 | End: 2023-05-03

## 2023-05-02 RX ORDER — CARVEDILOL 12.5 MG/1
25 TABLET ORAL 2 TIMES DAILY WITH MEALS
Status: DISCONTINUED | OUTPATIENT
Start: 2023-05-02 | End: 2023-05-09

## 2023-05-02 RX ORDER — HYDROCODONE BITARTRATE AND ACETAMINOPHEN 5; 325 MG/1; MG/1
1 TABLET ORAL ONCE
Status: COMPLETED | OUTPATIENT
Start: 2023-05-02 | End: 2023-05-02

## 2023-05-02 RX ORDER — NIFEDIPINE 30 MG/1
60 TABLET, EXTENDED RELEASE ORAL EVERY MORNING
Status: DISCONTINUED | OUTPATIENT
Start: 2023-05-03 | End: 2023-05-09

## 2023-05-02 RX ORDER — ATORVASTATIN CALCIUM 20 MG/1
20 TABLET, FILM COATED ORAL NIGHTLY
Status: DISCONTINUED | OUTPATIENT
Start: 2023-05-02 | End: 2023-05-03

## 2023-05-02 RX ORDER — ACETAMINOPHEN 500 MG
500 TABLET ORAL EVERY 4 HOURS PRN
Status: DISCONTINUED | OUTPATIENT
Start: 2023-05-02 | End: 2023-05-09

## 2023-05-02 RX ORDER — SODIUM CHLORIDE 9 MG/ML
INJECTION, SOLUTION INTRAVENOUS ONCE
Status: DISCONTINUED | OUTPATIENT
Start: 2023-05-02 | End: 2023-05-03

## 2023-05-02 RX ORDER — ISOSORBIDE MONONITRATE 30 MG/1
30 TABLET, EXTENDED RELEASE ORAL DAILY
Status: DISCONTINUED | OUTPATIENT
Start: 2023-05-03 | End: 2023-05-06

## 2023-05-02 RX ORDER — SODIUM CHLORIDE 9 MG/ML
INJECTION, SOLUTION INTRAVENOUS CONTINUOUS
Status: DISCONTINUED | OUTPATIENT
Start: 2023-05-02 | End: 2023-05-05

## 2023-05-02 RX ORDER — ONDANSETRON 2 MG/ML
4 INJECTION INTRAMUSCULAR; INTRAVENOUS EVERY 4 HOURS PRN
Status: ACTIVE | OUTPATIENT
Start: 2023-05-02 | End: 2023-05-02

## 2023-05-02 RX ORDER — SODIUM BICARBONATE 325 MG/1
650 TABLET ORAL 3 TIMES DAILY
Status: DISCONTINUED | OUTPATIENT
Start: 2023-05-02 | End: 2023-05-09

## 2023-05-02 RX ORDER — ALBUTEROL SULFATE 90 UG/1
2 AEROSOL, METERED RESPIRATORY (INHALATION) EVERY 6 HOURS PRN
Status: DISCONTINUED | OUTPATIENT
Start: 2023-05-02 | End: 2023-05-09

## 2023-05-02 RX ORDER — NIFEDIPINE 30 MG/1
30 TABLET, EXTENDED RELEASE ORAL NIGHTLY
Status: DISCONTINUED | OUTPATIENT
Start: 2023-05-03 | End: 2023-05-06

## 2023-05-02 RX ORDER — LAMOTRIGINE 150 MG/1
150 TABLET ORAL DAILY
Status: DISCONTINUED | OUTPATIENT
Start: 2023-05-03 | End: 2023-05-09

## 2023-05-02 NOTE — ED INITIAL ASSESSMENT (HPI)
Pt states that he has been coughing for 3 days. Pt denies fevers. Pt states that his home health nurse told him that he has diminished lung sounds to the RLL. Pt states pain to the RUQ and back.

## 2023-05-02 NOTE — ED QUICK NOTES
Orders for admission, patient is aware of plan and ready to go upstairs. Any questions, please call ED RN Ema Juan  at extension [phone]. Vaccinated?  yes  Type of COVID test sent:rapid  COVID Suspicion level: Low      Titratable drug(s) infusing:none  Rate:    LOC at time of transport:a/o x 4    Other pertinent information:none    CIWA score=0  NIH score=0

## 2023-05-02 NOTE — PLAN OF CARE
Received patient at [phone] from  ED. Alert and oriented x4. Tele rhythm NSR/ST. O2 saturation is 94% on room air. Breath sounds with crackles in R lobe. Pt oriented to room and unit. Reviewed plan of care pt verbalizes understanding.

## 2023-05-03 ENCOUNTER — APPOINTMENT (OUTPATIENT)
Dept: GENERAL RADIOLOGY | Facility: HOSPITAL | Age: 43
End: 2023-05-03
Attending: HOSPITALIST
Payer: MEDICAID

## 2023-05-03 LAB
ALBUMIN SERPL-MCNC: 3 G/DL (ref 3.4–5)
ALBUMIN/GLOB SERPL: 0.9 {RATIO} (ref 1–2)
ALP LIVER SERPL-CCNC: 82 U/L
ALT SERPL-CCNC: 26 U/L
ANION GAP SERPL CALC-SCNC: 7 MMOL/L (ref 0–18)
AST SERPL-CCNC: 34 U/L (ref 15–37)
BILIRUB SERPL-MCNC: 0.5 MG/DL (ref 0.1–2)
BILIRUB UR QL STRIP.AUTO: NEGATIVE
BUN BLD-MCNC: 38 MG/DL (ref 7–18)
CALCIUM BLD-MCNC: 8.1 MG/DL (ref 8.5–10.1)
CHLORIDE SERPL-SCNC: 112 MMOL/L (ref 98–112)
CLARITY UR REFRACT.AUTO: CLEAR
CO2 SERPL-SCNC: 22 MMOL/L (ref 21–32)
COLOR UR AUTO: YELLOW
CREAT BLD-MCNC: 3.93 MG/DL
CREAT UR-SCNC: 116 MG/DL
ERYTHROCYTE [DISTWIDTH] IN BLOOD BY AUTOMATED COUNT: 15 %
GFR SERPLBLD BASED ON 1.73 SQ M-ARVRAT: 18 ML/MIN/1.73M2 (ref 60–?)
GLOBULIN PLAS-MCNC: 3.4 G/DL (ref 2.8–4.4)
GLUCOSE BLD-MCNC: 103 MG/DL (ref 70–99)
GLUCOSE BLD-MCNC: 139 MG/DL (ref 70–99)
GLUCOSE BLD-MCNC: 143 MG/DL (ref 70–99)
GLUCOSE BLD-MCNC: 147 MG/DL (ref 70–99)
GLUCOSE BLD-MCNC: 148 MG/DL (ref 70–99)
GLUCOSE UR STRIP.AUTO-MCNC: >=500 MG/DL
HCT VFR BLD AUTO: 29 %
HGB BLD-MCNC: 9.7 G/DL
KETONES UR STRIP.AUTO-MCNC: NEGATIVE MG/DL
L PNEUMO AG UR QL: NEGATIVE
LEUKOCYTE ESTERASE UR QL STRIP.AUTO: NEGATIVE
MAGNESIUM SERPL-MCNC: 2.3 MG/DL (ref 1.6–2.6)
MCH RBC QN AUTO: 30.8 PG (ref 26–34)
MCHC RBC AUTO-ENTMCNC: 33.4 G/DL (ref 31–37)
MCV RBC AUTO: 92.1 FL
NITRITE UR QL STRIP.AUTO: NEGATIVE
OSMOLALITY SERPL CALC.SUM OF ELEC: 303 MOSM/KG (ref 275–295)
OSMOLALITY UR: 462 MOSM/KG (ref 300–1300)
PH UR STRIP.AUTO: 7 [PH] (ref 5–8)
PLATELET # BLD AUTO: 216 10(3)UL (ref 150–450)
POTASSIUM SERPL-SCNC: 3.7 MMOL/L (ref 3.5–5.1)
PROT SERPL-MCNC: 6.4 G/DL (ref 6.4–8.2)
PROT UR STRIP.AUTO-MCNC: >=500 MG/DL
RBC # BLD AUTO: 3.15 X10(6)UL
SODIUM SERPL-SCNC: 141 MMOL/L (ref 136–145)
SODIUM SERPL-SCNC: 23 MMOL/L
SP GR UR STRIP.AUTO: 1.01 (ref 1–1.03)
STREP PNEUMO ANTIGEN, URINE: NEGATIVE
UROBILINOGEN UR STRIP.AUTO-MCNC: <2 MG/DL
WBC # BLD AUTO: 7.1 X10(3) UL (ref 4–11)

## 2023-05-03 PROCEDURE — 85027 COMPLETE CBC AUTOMATED: CPT | Performed by: HOSPITALIST

## 2023-05-03 PROCEDURE — 81001 URINALYSIS AUTO W/SCOPE: CPT | Performed by: HOSPITALIST

## 2023-05-03 PROCEDURE — 83735 ASSAY OF MAGNESIUM: CPT | Performed by: HOSPITALIST

## 2023-05-03 PROCEDURE — 83935 ASSAY OF URINE OSMOLALITY: CPT | Performed by: HOSPITALIST

## 2023-05-03 PROCEDURE — 74018 RADEX ABDOMEN 1 VIEW: CPT | Performed by: HOSPITALIST

## 2023-05-03 PROCEDURE — 82962 GLUCOSE BLOOD TEST: CPT

## 2023-05-03 PROCEDURE — 94640 AIRWAY INHALATION TREATMENT: CPT

## 2023-05-03 PROCEDURE — 87449 NOS EACH ORGANISM AG IA: CPT | Performed by: HOSPITALIST

## 2023-05-03 PROCEDURE — 82570 ASSAY OF URINE CREATININE: CPT | Performed by: HOSPITALIST

## 2023-05-03 PROCEDURE — 84300 ASSAY OF URINE SODIUM: CPT | Performed by: HOSPITALIST

## 2023-05-03 PROCEDURE — 80053 COMPREHEN METABOLIC PANEL: CPT | Performed by: HOSPITALIST

## 2023-05-03 RX ORDER — LABETALOL HYDROCHLORIDE 5 MG/ML
10 INJECTION, SOLUTION INTRAVENOUS EVERY 6 HOURS PRN
Status: DISCONTINUED | OUTPATIENT
Start: 2023-05-03 | End: 2023-05-09

## 2023-05-03 RX ORDER — HYDRALAZINE HYDROCHLORIDE 25 MG/1
25 TABLET, FILM COATED ORAL 3 TIMES DAILY PRN
Status: DISCONTINUED | OUTPATIENT
Start: 2023-05-03 | End: 2023-05-09

## 2023-05-03 NOTE — CM/SW NOTE
Patient is current with [facility] RN services prior to admission per chart review. CM faxed clinicals to [facility] at [phone]; will send signed order to resume care when received. Update:  1330: CM received call from [place] [facility] at [facility] for update; agency requesting new face to face certification for patient to resume care. CM to fax signed F2F order to agency when received. CM/SW to call [facility] at [phone] when patient is medically cleared for discharge. SW/CM to remain available for any further discharge planning needs.    Estella Aguilar, RN Case Manager [id number]

## 2023-05-03 NOTE — PLAN OF CARE
Patient is alert and oriented x 4. Maintaining O2 saturation WNL on room air. NSR on tele monitor. Patient continent of bladder and bowel. Complains of pain to the lower back, PRN Tramadol and lidocaine patch given. Remains NPO per MD order. Safety precautions in place, call light within reach. Will continue to monitor.      Problem: PAIN - ADULT  Goal: Verbalizes/displays adequate comfort level or patient's stated pain goal  Description: INTERVENTIONS:  - Encourage pt to monitor pain and request assistance  - Assess pain using appropriate pain scale  - Administer analgesics based on type and severity of pain and evaluate response  - Implement non-pharmacological measures as appropriate and evaluate response  - Consider cultural and social influences on pain and pain management  - Manage/alleviate anxiety  - Utilize distraction and/or relaxation techniques  - Monitor for opioid side effects  - Notify MD/LIP if interventions unsuccessful or patient reports new pain  - Anticipate increased pain with activity and pre-medicate as appropriate  Outcome: Progressing     Problem: RESPIRATORY - ADULT  Goal: Achieves optimal ventilation and oxygenation  Description: INTERVENTIONS:  - Assess for changes in respiratory status  - Assess for changes in mentation and behavior  - Position to facilitate oxygenation and minimize respiratory effort  - Oxygen supplementation based on oxygen saturation or ABGs  - Provide Smoking Cessation handout, if applicable  - Encourage broncho-pulmonary hygiene including cough, deep breathe, Incentive Spirometry  - Assess the need for suctioning and perform as needed  - Assess and instruct to report SOB or any respiratory difficulty  - Respiratory Therapy support as indicated  - Manage/alleviate anxiety  - Monitor for signs/symptoms of CO2 retention  Outcome: Progressing

## 2023-05-03 NOTE — PLAN OF CARE
Received patient at 0730. Alert and Oriented x4. Tele Rhythm NSR. Pt on RA. Breath sounds show crackles in R base. Bed is locked and in low position. Call light and personal items within reach. No C/O chest pain or shortness of breath. Pt complains of R lower back pain. Pt voiding with no issue. Pt ambulates with stand by assist. Skin dry and intact. Pt is NPO. X ray of abdomen ordered. IV zosyn infusing. IV NS. Reviewed plan of care and patient verbalizes understanding.      Plan:  XRAY abdomen  IV zosyn    Problem: PAIN - ADULT  Goal: Verbalizes/displays adequate comfort level or patient's stated pain goal  Description: INTERVENTIONS:  - Encourage pt to monitor pain and request assistance  - Assess pain using appropriate pain scale  - Administer analgesics based on type and severity of pain and evaluate response  - Implement non-pharmacological measures as appropriate and evaluate response  - Consider cultural and social influences on pain and pain management  - Manage/alleviate anxiety  - Utilize distraction and/or relaxation techniques  - Monitor for opioid side effects  - Notify MD/LIP if interventions unsuccessful or patient reports new pain  - Anticipate increased pain with activity and pre-medicate as appropriate  Outcome: Progressing     Problem: RESPIRATORY - ADULT  Goal: Achieves optimal ventilation and oxygenation  Description: INTERVENTIONS:  - Assess for changes in respiratory status  - Assess for changes in mentation and behavior  - Position to facilitate oxygenation and minimize respiratory effort  - Oxygen supplementation based on oxygen saturation or ABGs  - Provide Smoking Cessation handout, if applicable  - Encourage broncho-pulmonary hygiene including cough, deep breathe, Incentive Spirometry  - Assess the need for suctioning and perform as needed  - Assess and instruct to report SOB or any respiratory difficulty  - Respiratory Therapy support as indicated  - Manage/alleviate anxiety  - Monitor for signs/symptoms of CO2 retention  Outcome: Progressing

## 2023-05-04 ENCOUNTER — APPOINTMENT (OUTPATIENT)
Dept: CT IMAGING | Facility: HOSPITAL | Age: 43
End: 2023-05-04
Attending: HOSPITALIST
Payer: MEDICAID

## 2023-05-04 LAB
ALBUMIN SERPL-MCNC: 3.1 G/DL (ref 3.4–5)
ALBUMIN/GLOB SERPL: 0.9 {RATIO} (ref 1–2)
ALP LIVER SERPL-CCNC: 86 U/L
ALT SERPL-CCNC: 23 U/L
ANION GAP SERPL CALC-SCNC: 6 MMOL/L (ref 0–18)
AST SERPL-CCNC: 28 U/L (ref 15–37)
BILIRUB SERPL-MCNC: 0.6 MG/DL (ref 0.1–2)
BUN BLD-MCNC: 27 MG/DL (ref 7–18)
CALCIUM BLD-MCNC: 8.5 MG/DL (ref 8.5–10.1)
CHLORIDE SERPL-SCNC: 112 MMOL/L (ref 98–112)
CO2 SERPL-SCNC: 23 MMOL/L (ref 21–32)
CREAT BLD-MCNC: 3.65 MG/DL
ERYTHROCYTE [DISTWIDTH] IN BLOOD BY AUTOMATED COUNT: 14.6 %
GFR SERPLBLD BASED ON 1.73 SQ M-ARVRAT: 20 ML/MIN/1.73M2 (ref 60–?)
GLOBULIN PLAS-MCNC: 3.6 G/DL (ref 2.8–4.4)
GLUCOSE BLD-MCNC: 100 MG/DL (ref 70–99)
GLUCOSE BLD-MCNC: 124 MG/DL (ref 70–99)
GLUCOSE BLD-MCNC: 128 MG/DL (ref 70–99)
GLUCOSE BLD-MCNC: 131 MG/DL (ref 70–99)
GLUCOSE BLD-MCNC: 157 MG/DL (ref 70–99)
HCT VFR BLD AUTO: 30.7 %
HGB BLD-MCNC: 10.4 G/DL
MAGNESIUM SERPL-MCNC: 2.1 MG/DL (ref 1.6–2.6)
MCH RBC QN AUTO: 31 PG (ref 26–34)
MCHC RBC AUTO-ENTMCNC: 33.9 G/DL (ref 31–37)
MCV RBC AUTO: 91.4 FL
OSMOLALITY SERPL CALC.SUM OF ELEC: 299 MOSM/KG (ref 275–295)
PHOSPHATE SERPL-MCNC: 3.3 MG/DL (ref 2.5–4.9)
PLATELET # BLD AUTO: 224 10(3)UL (ref 150–450)
POTASSIUM SERPL-SCNC: 3.4 MMOL/L (ref 3.5–5.1)
PROT SERPL-MCNC: 6.7 G/DL (ref 6.4–8.2)
RBC # BLD AUTO: 3.36 X10(6)UL
SODIUM SERPL-SCNC: 141 MMOL/L (ref 136–145)
WBC # BLD AUTO: 7.3 X10(3) UL (ref 4–11)

## 2023-05-04 PROCEDURE — 84100 ASSAY OF PHOSPHORUS: CPT | Performed by: STUDENT IN AN ORGANIZED HEALTH CARE EDUCATION/TRAINING PROGRAM

## 2023-05-04 PROCEDURE — 74176 CT ABD & PELVIS W/O CONTRAST: CPT | Performed by: HOSPITALIST

## 2023-05-04 PROCEDURE — 80053 COMPREHEN METABOLIC PANEL: CPT | Performed by: HOSPITALIST

## 2023-05-04 PROCEDURE — 85027 COMPLETE CBC AUTOMATED: CPT | Performed by: HOSPITALIST

## 2023-05-04 PROCEDURE — 83735 ASSAY OF MAGNESIUM: CPT | Performed by: HOSPITALIST

## 2023-05-04 PROCEDURE — 82962 GLUCOSE BLOOD TEST: CPT

## 2023-05-04 RX ORDER — HYDRALAZINE HYDROCHLORIDE 25 MG/1
25 TABLET, FILM COATED ORAL EVERY 8 HOURS SCHEDULED
Status: DISCONTINUED | OUTPATIENT
Start: 2023-05-04 | End: 2023-05-06

## 2023-05-04 NOTE — PROGRESS NOTES
Pt received at the bedside, A&O X4. On RA. NSR on tele. Pt c/o lower abdominal pain (9/10) and diarrhea while on clear liquid diet. Hospitalist notified. Plan of care discussed w/t pt. Call light with in reach. Fall precaution in place. All needs met at this time.

## 2023-05-04 NOTE — PLAN OF CARE
Patient alert and oriented x4. Up with standby assist. NSR/ST on tele. Maintaining o2 sats >90% on RA. C/o pain, prn tramadol given with relief. IVF. IV abx given. Updated patient on POC, verbalized understanding. Safety precautions put in place, bed alarm on.     Problem: PAIN - ADULT  Goal: Verbalizes/displays adequate comfort level or patient's stated pain goal  Description: INTERVENTIONS:  - Encourage pt to monitor pain and request assistance  - Assess pain using appropriate pain scale  - Administer analgesics based on type and severity of pain and evaluate response  - Implement non-pharmacological measures as appropriate and evaluate response  - Consider cultural and social influences on pain and pain management  - Manage/alleviate anxiety  - Utilize distraction and/or relaxation techniques  - Monitor for opioid side effects  - Notify MD/LIP if interventions unsuccessful or patient reports new pain  - Anticipate increased pain with activity and pre-medicate as appropriate  Outcome: Progressing     Problem: RESPIRATORY - ADULT  Goal: Achieves optimal ventilation and oxygenation  Description: INTERVENTIONS:  - Assess for changes in respiratory status  - Assess for changes in mentation and behavior  - Position to facilitate oxygenation and minimize respiratory effort  - Oxygen supplementation based on oxygen saturation or ABGs  - Provide Smoking Cessation handout, if applicable  - Encourage broncho-pulmonary hygiene including cough, deep breathe, Incentive Spirometry  - Assess the need for suctioning and perform as needed  - Assess and instruct to report SOB or any respiratory difficulty  - Respiratory Therapy support as indicated  - Manage/alleviate anxiety  - Monitor for signs/symptoms of CO2 retention  Outcome: Progressing

## 2023-05-05 LAB
ALBUMIN SERPL-MCNC: 3.2 G/DL (ref 3.4–5)
ALBUMIN/GLOB SERPL: 0.8 {RATIO} (ref 1–2)
ALP LIVER SERPL-CCNC: 87 U/L
ALT SERPL-CCNC: 21 U/L
ANION GAP SERPL CALC-SCNC: 7 MMOL/L (ref 0–18)
AST SERPL-CCNC: 26 U/L (ref 15–37)
BILIRUB SERPL-MCNC: 0.5 MG/DL (ref 0.1–2)
BUN BLD-MCNC: 29 MG/DL (ref 7–18)
CALCIUM BLD-MCNC: 8.6 MG/DL (ref 8.5–10.1)
CHLORIDE SERPL-SCNC: 110 MMOL/L (ref 98–112)
CO2 SERPL-SCNC: 22 MMOL/L (ref 21–32)
CREAT BLD-MCNC: 4.2 MG/DL
ERYTHROCYTE [DISTWIDTH] IN BLOOD BY AUTOMATED COUNT: 14.5 %
GFR SERPLBLD BASED ON 1.73 SQ M-ARVRAT: 17 ML/MIN/1.73M2 (ref 60–?)
GLOBULIN PLAS-MCNC: 3.8 G/DL (ref 2.8–4.4)
GLUCOSE BLD-MCNC: 102 MG/DL (ref 70–99)
GLUCOSE BLD-MCNC: 139 MG/DL (ref 70–99)
GLUCOSE BLD-MCNC: 139 MG/DL (ref 70–99)
GLUCOSE BLD-MCNC: 185 MG/DL (ref 70–99)
HCT VFR BLD AUTO: 30.3 %
HGB BLD-MCNC: 10.2 G/DL
MAGNESIUM SERPL-MCNC: 2.2 MG/DL (ref 1.6–2.6)
MCH RBC QN AUTO: 30.9 PG (ref 26–34)
MCHC RBC AUTO-ENTMCNC: 33.7 G/DL (ref 31–37)
MCV RBC AUTO: 91.8 FL
OSMOLALITY SERPL CALC.SUM OF ELEC: 299 MOSM/KG (ref 275–295)
PLATELET # BLD AUTO: 225 10(3)UL (ref 150–450)
POTASSIUM SERPL-SCNC: 3.2 MMOL/L (ref 3.5–5.1)
PROT SERPL-MCNC: 7 G/DL (ref 6.4–8.2)
RBC # BLD AUTO: 3.3 X10(6)UL
SODIUM SERPL-SCNC: 139 MMOL/L (ref 136–145)
WBC # BLD AUTO: 7.7 X10(3) UL (ref 4–11)

## 2023-05-05 PROCEDURE — 82962 GLUCOSE BLOOD TEST: CPT

## 2023-05-05 PROCEDURE — 85027 COMPLETE CBC AUTOMATED: CPT | Performed by: HOSPITALIST

## 2023-05-05 PROCEDURE — 80053 COMPREHEN METABOLIC PANEL: CPT | Performed by: HOSPITALIST

## 2023-05-05 PROCEDURE — 83735 ASSAY OF MAGNESIUM: CPT | Performed by: HOSPITALIST

## 2023-05-05 RX ORDER — HYDRALAZINE HYDROCHLORIDE 20 MG/ML
10 INJECTION INTRAMUSCULAR; INTRAVENOUS ONCE
Status: COMPLETED | OUTPATIENT
Start: 2023-05-05 | End: 2023-05-05

## 2023-05-05 RX ORDER — HYDROMORPHONE HYDROCHLORIDE 1 MG/ML
0.5 INJECTION, SOLUTION INTRAMUSCULAR; INTRAVENOUS; SUBCUTANEOUS ONCE
Status: COMPLETED | OUTPATIENT
Start: 2023-05-05 | End: 2023-05-05

## 2023-05-05 RX ORDER — POTASSIUM CHLORIDE 20 MEQ/1
20 TABLET, EXTENDED RELEASE ORAL DAILY
Status: DISCONTINUED | OUTPATIENT
Start: 2023-05-05 | End: 2023-05-05

## 2023-05-05 RX ORDER — POTASSIUM CHLORIDE 20 MEQ/1
20 TABLET, EXTENDED RELEASE ORAL ONCE
Status: COMPLETED | OUTPATIENT
Start: 2023-05-05 | End: 2023-05-05

## 2023-05-05 RX ORDER — POTASSIUM CHLORIDE 20 MEQ/1
40 TABLET, EXTENDED RELEASE ORAL ONCE
Status: COMPLETED | OUTPATIENT
Start: 2023-05-05 | End: 2023-05-05

## 2023-05-05 NOTE — PLAN OF CARE
Received pt at 0700. Pt is A&Ox4, no complaints of pain- will monitor for ABD pain/nausea. Pt is on room air, lungs are diminished with crackles in LLL, no coughing. Pt is in NSR/ST, no complaints of chest pain. He is continent of B&B, active bowel sounds, abdomen non-tender, last BM 5-4. Pt updated with plan of care. Approx 1230- pt gets up in room by himself. Went to bathroom, exclaims that he sat on the toilet, had diarrhea, then his stomach started to hurt 10/10 and that he needed to lay down immediately so he laid down on the floor. RN came in, walked patient back to bed. Hospitalist notified. Pt exclaims he did not fall or hit head/body, that he needed to lay down asap. Tramadol given for LLQ pain radiating to RLQ- MD notified of pain/ med orders. IV dilaudid once ordered per MD. This RN explained to pt that she does not want pt getting up on his own now due to pain/medications- bed alarm on & call light within reach. Approx 1800- notified hospitalist that pt received PRN labatelol @ 1503 & PRN hydralazine @ 1630 as well as his scheduled coreg @ 1700 and pt BP still remain elevated at 166/105 and 170/101. Per MD pt to receive 10 mg IV hydralazine x1.          Problem: PAIN - ADULT  Goal: Verbalizes/displays adequate comfort level or patient's stated pain goal  Description: INTERVENTIONS:  - Encourage pt to monitor pain and request assistance  - Assess pain using appropriate pain scale  - Administer analgesics based on type and severity of pain and evaluate response  - Implement non-pharmacological measures as appropriate and evaluate response  - Consider cultural and social influences on pain and pain management  - Manage/alleviate anxiety  - Utilize distraction and/or relaxation techniques  - Monitor for opioid side effects  - Notify MD/LIP if interventions unsuccessful or patient reports new pain  - Anticipate increased pain with activity and pre-medicate as appropriate  Outcome: Progressing Problem: RESPIRATORY - ADULT  Goal: Achieves optimal ventilation and oxygenation  Description: INTERVENTIONS:  - Assess for changes in respiratory status  - Assess for changes in mentation and behavior  - Position to facilitate oxygenation and minimize respiratory effort  - Oxygen supplementation based on oxygen saturation or ABGs  - Provide Smoking Cessation handout, if applicable  - Encourage broncho-pulmonary hygiene including cough, deep breathe, Incentive Spirometry  - Assess the need for suctioning and perform as needed  - Assess and instruct to report SOB or any respiratory difficulty  - Respiratory Therapy support as indicated  - Manage/alleviate anxiety  - Monitor for signs/symptoms of CO2 retention  Outcome: Progressing     Problem: Patient/Family Goals  Goal: Patient/Family Long Term Goal  Description: Patient's Long Term Goal: stay out of hospital 5-5    Interventions:  - med compliance, follow ups  - monitor kidney function        - See additional Care Plan goals for specific interventions  Outcome: Progressing  Goal: Patient/Family Short Term Goal  Description: Patient's Short Term Goal: no pain 5-5    Interventions:   - PRN meds, hot/cold packs, tell nurse if in pain     - See additional Care Plan goals for specific interventions  Outcome: Progressing

## 2023-05-05 NOTE — PLAN OF CARE
Alert and oriented x4. On RA. Denies any SOB or chest pain. NSR on tele. Continent of bowel and bladder. Mild abd pain controlled with prn meds. Up at [address]. QID accucheck. Call light within reach.        Problem: PAIN - ADULT  Goal: Verbalizes/displays adequate comfort level or patient's stated pain goal  Description: INTERVENTIONS:  - Encourage pt to monitor pain and request assistance  - Assess pain using appropriate pain scale  - Administer analgesics based on type and severity of pain and evaluate response  - Implement non-pharmacological measures as appropriate and evaluate response  - Consider cultural and social influences on pain and pain management  - Manage/alleviate anxiety  - Utilize distraction and/or relaxation techniques  - Monitor for opioid side effects  - Notify MD/LIP if interventions unsuccessful or patient reports new pain  - Anticipate increased pain with activity and pre-medicate as appropriate  Outcome: Progressing     Problem: RESPIRATORY - ADULT  Goal: Achieves optimal ventilation and oxygenation  Description: INTERVENTIONS:  - Assess for changes in respiratory status  - Assess for changes in mentation and behavior  - Position to facilitate oxygenation and minimize respiratory effort  - Oxygen supplementation based on oxygen saturation or ABGs  - Provide Smoking Cessation handout, if applicable  - Encourage broncho-pulmonary hygiene including cough, deep breathe, Incentive Spirometry  - Assess the need for suctioning and perform as needed  - Assess and instruct to report SOB or any respiratory difficulty  - Respiratory Therapy support as indicated  - Manage/alleviate anxiety  - Monitor for signs/symptoms of CO2 retention  Outcome: Progressing

## 2023-05-05 NOTE — CM/SW NOTE
CM spoke to [facility] at [facility] [phone] for discharge status update and sent requested clinical documentation via [employer].      Casi Gaona RN Case Manager [id number]

## 2023-05-06 LAB
ADENOVIRUS F 40/41 PCR: NEGATIVE
ALBUMIN SERPL-MCNC: 3.2 G/DL (ref 3.4–5)
ALBUMIN/GLOB SERPL: 0.8 {RATIO} (ref 1–2)
ALP LIVER SERPL-CCNC: 81 U/L
ALT SERPL-CCNC: 18 U/L
ANION GAP SERPL CALC-SCNC: 9 MMOL/L (ref 0–18)
AST SERPL-CCNC: 28 U/L (ref 15–37)
ASTROVIRUS PCR: NEGATIVE
BILIRUB SERPL-MCNC: 0.4 MG/DL (ref 0.1–2)
BUN BLD-MCNC: 28 MG/DL (ref 7–18)
C CAYETANENSIS DNA SPEC QL NAA+PROBE: NEGATIVE
C DIFF TOX B STL QL: NEGATIVE
CALCIUM BLD-MCNC: 8.6 MG/DL (ref 8.5–10.1)
CAMPY SP DNA.DIARRHEA STL QL NAA+PROBE: NEGATIVE
CHLORIDE SERPL-SCNC: 111 MMOL/L (ref 98–112)
CO2 SERPL-SCNC: 22 MMOL/L (ref 21–32)
CREAT BLD-MCNC: 4.3 MG/DL
CRYPTOSP DNA SPEC QL NAA+PROBE: NEGATIVE
EAEC PAA PLAS AGGR+AATA ST NAA+NON-PRB: NEGATIVE
EC STX1+STX2 + H7 FLIC SPEC NAA+PROBE: NEGATIVE
ENTAMOEBA HISTOLYTICA PCR: NEGATIVE
EPEC EAE GENE STL QL NAA+NON-PROBE: NEGATIVE
ERYTHROCYTE [DISTWIDTH] IN BLOOD BY AUTOMATED COUNT: 14.3 %
ETEC LTA+ST1A+ST1B TOX ST NAA+NON-PROBE: NEGATIVE
GFR SERPLBLD BASED ON 1.73 SQ M-ARVRAT: 16 ML/MIN/1.73M2 (ref 60–?)
GIARDIA LAMBLIA PCR: NEGATIVE
GLOBULIN PLAS-MCNC: 3.9 G/DL (ref 2.8–4.4)
GLUCOSE BLD-MCNC: 160 MG/DL (ref 70–99)
GLUCOSE BLD-MCNC: 161 MG/DL (ref 70–99)
GLUCOSE BLD-MCNC: 162 MG/DL (ref 70–99)
GLUCOSE BLD-MCNC: 209 MG/DL (ref 70–99)
GLUCOSE BLD-MCNC: 85 MG/DL (ref 70–99)
HCT VFR BLD AUTO: 32.4 %
HGB BLD-MCNC: 10.8 G/DL
MAGNESIUM SERPL-MCNC: 2.2 MG/DL (ref 1.6–2.6)
MCH RBC QN AUTO: 30.9 PG (ref 26–34)
MCHC RBC AUTO-ENTMCNC: 33.3 G/DL (ref 31–37)
MCV RBC AUTO: 92.6 FL
NOROVIRUS GI/GII PCR: NEGATIVE
OSMOLALITY SERPL CALC.SUM OF ELEC: 303 MOSM/KG (ref 275–295)
P SHIGELLOIDES DNA STL QL NAA+PROBE: NEGATIVE
PLATELET # BLD AUTO: 252 10(3)UL (ref 150–450)
POTASSIUM SERPL-SCNC: 3.2 MMOL/L (ref 3.5–5.1)
PROT SERPL-MCNC: 7.1 G/DL (ref 6.4–8.2)
RBC # BLD AUTO: 3.5 X10(6)UL
ROTAVIRUS A PCR: NEGATIVE
SALMONELLA DNA SPEC QL NAA+PROBE: NEGATIVE
SAPOVIRUS PCR: NEGATIVE
SHIGELLA SP+EIEC IPAH ST NAA+NON-PROBE: NEGATIVE
SODIUM SERPL-SCNC: 142 MMOL/L (ref 136–145)
V CHOLERAE DNA SPEC QL NAA+PROBE: NEGATIVE
VIBRIO DNA SPEC NAA+PROBE: NEGATIVE
WBC # BLD AUTO: 7.4 X10(3) UL (ref 4–11)
YERSINIA DNA SPEC NAA+PROBE: NEGATIVE

## 2023-05-06 PROCEDURE — 80053 COMPREHEN METABOLIC PANEL: CPT | Performed by: HOSPITALIST

## 2023-05-06 PROCEDURE — 85027 COMPLETE CBC AUTOMATED: CPT | Performed by: HOSPITALIST

## 2023-05-06 PROCEDURE — 82962 GLUCOSE BLOOD TEST: CPT

## 2023-05-06 PROCEDURE — 87507 IADNA-DNA/RNA PROBE TQ 12-25: CPT | Performed by: INTERNAL MEDICINE

## 2023-05-06 PROCEDURE — 83735 ASSAY OF MAGNESIUM: CPT | Performed by: HOSPITALIST

## 2023-05-06 PROCEDURE — 87493 C DIFF AMPLIFIED PROBE: CPT | Performed by: HOSPITALIST

## 2023-05-06 RX ORDER — POTASSIUM CHLORIDE 20 MEQ/1
40 TABLET, EXTENDED RELEASE ORAL ONCE
Status: COMPLETED | OUTPATIENT
Start: 2023-05-06 | End: 2023-05-06

## 2023-05-06 RX ORDER — SIMETHICONE 80 MG
80 TABLET,CHEWABLE ORAL 4 TIMES DAILY PRN
Status: DISCONTINUED | OUTPATIENT
Start: 2023-05-06 | End: 2023-05-09

## 2023-05-06 RX ORDER — ISOSORBIDE MONONITRATE 60 MG/1
60 TABLET, EXTENDED RELEASE ORAL DAILY
Status: DISCONTINUED | OUTPATIENT
Start: 2023-05-07 | End: 2023-05-09

## 2023-05-06 RX ORDER — CLONIDINE HYDROCHLORIDE 0.1 MG/1
0.1 TABLET ORAL 2 TIMES DAILY
Status: DISCONTINUED | OUTPATIENT
Start: 2023-05-06 | End: 2023-05-09

## 2023-05-06 RX ORDER — MAGNESIUM HYDROXIDE/ALUMINUM HYDROXICE/SIMETHICONE 120; 1200; 1200 MG/30ML; MG/30ML; MG/30ML
30 SUSPENSION ORAL 4 TIMES DAILY PRN
Status: DISCONTINUED | OUTPATIENT
Start: 2023-05-06 | End: 2023-05-09

## 2023-05-06 RX ORDER — NIFEDIPINE 30 MG/1
60 TABLET, EXTENDED RELEASE ORAL NIGHTLY
Status: DISCONTINUED | OUTPATIENT
Start: 2023-05-06 | End: 2023-05-09

## 2023-05-06 RX ORDER — SIMETHICONE 80 MG
160 TABLET,CHEWABLE ORAL 4 TIMES DAILY PRN
Status: DISCONTINUED | OUTPATIENT
Start: 2023-05-06 | End: 2023-05-09

## 2023-05-06 NOTE — PLAN OF CARE
Assumed care for pt at 19:30 on 5/5    A&Ox4. Reporting no abdominal discomfort after eating jello. Ordered delivery of items in clear liquid diet category: three 20 oz soda, 2 packages gummy candies. VSS on RA. NSR on tele. Eliquis given. Voiding in urinal. Up with standby. Zosyn infusing. Plan: Daily weight, I&O, monitor BP, AM labs, Zosyn, MDs to advance diet as pt tolerates. Bed alarm on, call light in reach, able to make needs known.

## 2023-05-06 NOTE — PLAN OF CARE
Received pt at 0700. Pt is A&Ox4, no complaints of pain- monitor for ABD pain. Pt is on room air, lungs are diminished, no coughing. Pt is in NSR/ST, no complaints of chest pain. He is continent of B&B, diminished urine production, active bowel sounds, abdomen tender, last BM 5-5. Pt updated with plan of care.      POC  - monitor ABD pain  - monitor BP & kidney labs  - talk with MD's    Problem: PAIN - ADULT  Goal: Verbalizes/displays adequate comfort level or patient's stated pain goal  Description: INTERVENTIONS:  - Encourage pt to monitor pain and request assistance  - Assess pain using appropriate pain scale  - Administer analgesics based on type and severity of pain and evaluate response  - Implement non-pharmacological measures as appropriate and evaluate response  - Consider cultural and social influences on pain and pain management  - Manage/alleviate anxiety  - Utilize distraction and/or relaxation techniques  - Monitor for opioid side effects  - Notify MD/LIP if interventions unsuccessful or patient reports new pain  - Anticipate increased pain with activity and pre-medicate as appropriate  Outcome: Progressing     Problem: RESPIRATORY - ADULT  Goal: Achieves optimal ventilation and oxygenation  Description: INTERVENTIONS:  - Assess for changes in respiratory status  - Assess for changes in mentation and behavior  - Position to facilitate oxygenation and minimize respiratory effort  - Oxygen supplementation based on oxygen saturation or ABGs  - Provide Smoking Cessation handout, if applicable  - Encourage broncho-pulmonary hygiene including cough, deep breathe, Incentive Spirometry  - Assess the need for suctioning and perform as needed  - Assess and instruct to report SOB or any respiratory difficulty  - Respiratory Therapy support as indicated  - Manage/alleviate anxiety  - Monitor for signs/symptoms of CO2 retention  Outcome: Progressing     Problem: Patient/Family Goals  Goal: Patient/Family Long Term Goal  Description: Patient's Long Term Goal: stay out of hospital 5-5    Interventions:  - med compliance, follow ups  - monitor kidney function        - See additional Care Plan goals for specific interventions  Outcome: Progressing  Goal: Patient/Family Short Term Goal  Description: Patient's Short Term Goal: no pain 5-5  Talk with doctors about discharge 5-6    Interventions:   - PRN meds, hot/cold packs, tell nurse if in pain   - monitor BP/ kidney labs/ ABD pain     - See additional Care Plan goals for specific interventions  Outcome: Progressing

## 2023-05-07 LAB
ALBUMIN SERPL-MCNC: 3.4 G/DL (ref 3.4–5)
ALBUMIN/GLOB SERPL: 0.8 {RATIO} (ref 1–2)
ALP LIVER SERPL-CCNC: 84 U/L
ALT SERPL-CCNC: 21 U/L
ANION GAP SERPL CALC-SCNC: 7 MMOL/L (ref 0–18)
AST SERPL-CCNC: 21 U/L (ref 15–37)
BILIRUB SERPL-MCNC: 0.4 MG/DL (ref 0.1–2)
BUN BLD-MCNC: 28 MG/DL (ref 7–18)
CALCIUM BLD-MCNC: 8.4 MG/DL (ref 8.5–10.1)
CHLORIDE SERPL-SCNC: 114 MMOL/L (ref 98–112)
CO2 SERPL-SCNC: 20 MMOL/L (ref 21–32)
CREAT BLD-MCNC: 4.44 MG/DL
CREAT UR-SCNC: 104 MG/DL
ERYTHROCYTE [DISTWIDTH] IN BLOOD BY AUTOMATED COUNT: 14 %
GFR SERPLBLD BASED ON 1.73 SQ M-ARVRAT: 16 ML/MIN/1.73M2 (ref 60–?)
GLOBULIN PLAS-MCNC: 4.2 G/DL (ref 2.8–4.4)
GLUCOSE BLD-MCNC: 134 MG/DL (ref 70–99)
GLUCOSE BLD-MCNC: 141 MG/DL (ref 70–99)
GLUCOSE BLD-MCNC: 155 MG/DL (ref 70–99)
GLUCOSE BLD-MCNC: 191 MG/DL (ref 70–99)
GLUCOSE BLD-MCNC: 94 MG/DL (ref 70–99)
HCT VFR BLD AUTO: 33.6 %
HGB BLD-MCNC: 11.4 G/DL
MAGNESIUM SERPL-MCNC: 2.2 MG/DL (ref 1.6–2.6)
MCH RBC QN AUTO: 30.9 PG (ref 26–34)
MCHC RBC AUTO-ENTMCNC: 33.9 G/DL (ref 31–37)
MCV RBC AUTO: 91.1 FL
OSMOLALITY SERPL CALC.SUM OF ELEC: 300 MOSM/KG (ref 275–295)
PLATELET # BLD AUTO: 268 10(3)UL (ref 150–450)
POTASSIUM SERPL-SCNC: 3.4 MMOL/L (ref 3.5–5.1)
POTASSIUM UR-SCNC: 60 MMOL/L
PROT SERPL-MCNC: 7.6 G/DL (ref 6.4–8.2)
RBC # BLD AUTO: 3.69 X10(6)UL
SODIUM SERPL-SCNC: 141 MMOL/L (ref 136–145)
SODIUM SERPL-SCNC: 61 MMOL/L
WBC # BLD AUTO: 7.7 X10(3) UL (ref 4–11)

## 2023-05-07 PROCEDURE — 83735 ASSAY OF MAGNESIUM: CPT | Performed by: HOSPITALIST

## 2023-05-07 PROCEDURE — 84300 ASSAY OF URINE SODIUM: CPT | Performed by: STUDENT IN AN ORGANIZED HEALTH CARE EDUCATION/TRAINING PROGRAM

## 2023-05-07 PROCEDURE — 84133 ASSAY OF URINE POTASSIUM: CPT | Performed by: STUDENT IN AN ORGANIZED HEALTH CARE EDUCATION/TRAINING PROGRAM

## 2023-05-07 PROCEDURE — 80053 COMPREHEN METABOLIC PANEL: CPT | Performed by: HOSPITALIST

## 2023-05-07 PROCEDURE — 82570 ASSAY OF URINE CREATININE: CPT | Performed by: STUDENT IN AN ORGANIZED HEALTH CARE EDUCATION/TRAINING PROGRAM

## 2023-05-07 PROCEDURE — 85027 COMPLETE CBC AUTOMATED: CPT | Performed by: HOSPITALIST

## 2023-05-07 PROCEDURE — 82962 GLUCOSE BLOOD TEST: CPT

## 2023-05-07 RX ORDER — LOPERAMIDE HYDROCHLORIDE 2 MG/1
4 CAPSULE ORAL 3 TIMES DAILY
Status: DISCONTINUED | OUTPATIENT
Start: 2023-05-07 | End: 2023-05-09

## 2023-05-07 RX ORDER — FUROSEMIDE 10 MG/ML
60 INJECTION INTRAMUSCULAR; INTRAVENOUS ONCE
Status: COMPLETED | OUTPATIENT
Start: 2023-05-07 | End: 2023-05-07

## 2023-05-07 RX ORDER — POTASSIUM CHLORIDE 20 MEQ/1
40 TABLET, EXTENDED RELEASE ORAL ONCE
Status: COMPLETED | OUTPATIENT
Start: 2023-05-07 | End: 2023-05-07

## 2023-05-07 RX ORDER — MELATONIN
3 NIGHTLY
Status: DISCONTINUED | OUTPATIENT
Start: 2023-05-07 | End: 2023-05-09

## 2023-05-07 NOTE — PLAN OF CARE
Rec'd pt at 0730. A&O x 4. Tele shows NSR. O2 sats adequate on RA. Pt continent, up ad osvaldo. C/O rt shoulder pain, given prn tramadol. Skin dry and intact. Bed locked and in low position, call light and personal items within reach. Will continue to monitor. POC - GI to see, monitor pain, IV abx, monitor creatinine (4.44 today).      Problem: PAIN - ADULT  Goal: Verbalizes/displays adequate comfort level or patient's stated pain goal  Description: INTERVENTIONS:  - Encourage pt to monitor pain and request assistance  - Assess pain using appropriate pain scale  - Administer analgesics based on type and severity of pain and evaluate response  - Implement non-pharmacological measures as appropriate and evaluate response  - Consider cultural and social influences on pain and pain management  - Manage/alleviate anxiety  - Utilize distraction and/or relaxation techniques  - Monitor for opioid side effects  - Notify MD/LIP if interventions unsuccessful or patient reports new pain  - Anticipate increased pain with activity and pre-medicate as appropriate  Outcome: Progressing     Problem: RESPIRATORY - ADULT  Goal: Achieves optimal ventilation and oxygenation  Description: INTERVENTIONS:  - Assess for changes in respiratory status  - Assess for changes in mentation and behavior  - Position to facilitate oxygenation and minimize respiratory effort  - Oxygen supplementation based on oxygen saturation or ABGs  - Provide Smoking Cessation handout, if applicable  - Encourage broncho-pulmonary hygiene including cough, deep breathe, Incentive Spirometry  - Assess the need for suctioning and perform as needed  - Assess and instruct to report SOB or any respiratory difficulty  - Respiratory Therapy support as indicated  - Manage/alleviate anxiety  - Monitor for signs/symptoms of CO2 retention  Outcome: Progressing     Problem: Patient/Family Goals  Goal: Patient/Family Long Term Goal  Description: Patient's Long Term Goal: stay out of hospital 5-5    Interventions:  - med compliance, follow ups  - monitor kidney function        - See additional Care Plan goals for specific interventions  Outcome: Progressing  Goal: Patient/Family Short Term Goal  Description: Patient's Short Term Goal: no pain 5-5  Talk with doctors about discharge 5-6    Interventions:   - PRN meds, hot/cold packs, tell nurse if in pain   - monitor BP/ kidney labs/ ABD pain     - See additional Care Plan goals for specific interventions  Outcome: Progressing

## 2023-05-08 LAB
ALBUMIN SERPL-MCNC: 3.3 G/DL (ref 3.4–5)
ALBUMIN/GLOB SERPL: 0.8 {RATIO} (ref 1–2)
ALP LIVER SERPL-CCNC: 83 U/L
ALT SERPL-CCNC: 18 U/L
ANION GAP SERPL CALC-SCNC: 3 MMOL/L (ref 0–18)
AST SERPL-CCNC: 19 U/L (ref 15–37)
BASOPHILS # BLD AUTO: 0.11 X10(3) UL (ref 0–0.2)
BASOPHILS NFR BLD AUTO: 1.7 %
BILIRUB SERPL-MCNC: 0.5 MG/DL (ref 0.1–2)
BUN BLD-MCNC: 28 MG/DL (ref 7–18)
CALCIUM BLD-MCNC: 8.7 MG/DL (ref 8.5–10.1)
CHLORIDE SERPL-SCNC: 114 MMOL/L (ref 98–112)
CO2 SERPL-SCNC: 22 MMOL/L (ref 21–32)
CREAT BLD-MCNC: 4.11 MG/DL
EOSINOPHIL # BLD AUTO: 0.58 X10(3) UL (ref 0–0.7)
EOSINOPHIL NFR BLD AUTO: 8.9 %
ERYTHROCYTE [DISTWIDTH] IN BLOOD BY AUTOMATED COUNT: 13.9 %
ERYTHROCYTE [DISTWIDTH] IN BLOOD BY AUTOMATED COUNT: 13.9 %
GFR SERPLBLD BASED ON 1.73 SQ M-ARVRAT: 17 ML/MIN/1.73M2 (ref 60–?)
GLOBULIN PLAS-MCNC: 4.1 G/DL (ref 2.8–4.4)
GLUCOSE BLD-MCNC: 124 MG/DL (ref 70–99)
GLUCOSE BLD-MCNC: 140 MG/DL (ref 70–99)
GLUCOSE BLD-MCNC: 142 MG/DL (ref 70–99)
GLUCOSE BLD-MCNC: 157 MG/DL (ref 70–99)
GLUCOSE BLD-MCNC: 233 MG/DL (ref 70–99)
HCT VFR BLD AUTO: 33 %
HCT VFR BLD AUTO: 33 %
HGB BLD-MCNC: 10.8 G/DL
HGB BLD-MCNC: 10.8 G/DL
IMM GRANULOCYTES # BLD AUTO: 0.01 X10(3) UL (ref 0–1)
IMM GRANULOCYTES NFR BLD: 0.2 %
LYMPHOCYTES # BLD AUTO: 2.04 X10(3) UL (ref 1–4)
LYMPHOCYTES NFR BLD AUTO: 31.3 %
MAGNESIUM SERPL-MCNC: 2.2 MG/DL (ref 1.6–2.6)
MCH RBC QN AUTO: 30.7 PG (ref 26–34)
MCH RBC QN AUTO: 30.7 PG (ref 26–34)
MCHC RBC AUTO-ENTMCNC: 32.7 G/DL (ref 31–37)
MCHC RBC AUTO-ENTMCNC: 32.7 G/DL (ref 31–37)
MCV RBC AUTO: 93.8 FL
MCV RBC AUTO: 93.8 FL
MONOCYTES # BLD AUTO: 0.6 X10(3) UL (ref 0.1–1)
MONOCYTES NFR BLD AUTO: 9.2 %
NEUTROPHILS # BLD AUTO: 3.18 X10 (3) UL (ref 1.5–7.7)
NEUTROPHILS # BLD AUTO: 3.18 X10(3) UL (ref 1.5–7.7)
NEUTROPHILS NFR BLD AUTO: 48.7 %
OSMOLALITY SERPL CALC.SUM OF ELEC: 295 MOSM/KG (ref 275–295)
PLATELET # BLD AUTO: 234 10(3)UL (ref 150–450)
PLATELET # BLD AUTO: 234 10(3)UL (ref 150–450)
POTASSIUM SERPL-SCNC: 3.3 MMOL/L (ref 3.5–5.1)
PROT SERPL-MCNC: 7.4 G/DL (ref 6.4–8.2)
RBC # BLD AUTO: 3.52 X10(6)UL
RBC # BLD AUTO: 3.52 X10(6)UL
SODIUM SERPL-SCNC: 139 MMOL/L (ref 136–145)
WBC # BLD AUTO: 6.5 X10(3) UL (ref 4–11)
WBC # BLD AUTO: 6.5 X10(3) UL (ref 4–11)

## 2023-05-08 PROCEDURE — 82962 GLUCOSE BLOOD TEST: CPT

## 2023-05-08 PROCEDURE — 85027 COMPLETE CBC AUTOMATED: CPT | Performed by: HOSPITALIST

## 2023-05-08 PROCEDURE — 80053 COMPREHEN METABOLIC PANEL: CPT | Performed by: HOSPITALIST

## 2023-05-08 PROCEDURE — 83735 ASSAY OF MAGNESIUM: CPT | Performed by: HOSPITALIST

## 2023-05-08 PROCEDURE — 85025 COMPLETE CBC W/AUTO DIFF WBC: CPT | Performed by: STUDENT IN AN ORGANIZED HEALTH CARE EDUCATION/TRAINING PROGRAM

## 2023-05-08 RX ORDER — POTASSIUM CHLORIDE 20 MEQ/1
40 TABLET, EXTENDED RELEASE ORAL ONCE
Status: COMPLETED | OUTPATIENT
Start: 2023-05-08 | End: 2023-05-08

## 2023-05-08 NOTE — PLAN OF CARE
Rec'd pt at 0730. A&O x 4. Tele shows NSR. O2 sats adequate on RA. Pt continent, up ad osvaldo. No C/O pain or SOB. Skin dry and intact. Bed locked and in low position, call light and personal items within reach. Will continue to monitor. POC - IV abx, monitor renal function, pain management.      Problem: PAIN - ADULT  Goal: Verbalizes/displays adequate comfort level or patient's stated pain goal  Description: INTERVENTIONS:  - Encourage pt to monitor pain and request assistance  - Assess pain using appropriate pain scale  - Administer analgesics based on type and severity of pain and evaluate response  - Implement non-pharmacological measures as appropriate and evaluate response  - Consider cultural and social influences on pain and pain management  - Manage/alleviate anxiety  - Utilize distraction and/or relaxation techniques  - Monitor for opioid side effects  - Notify MD/LIP if interventions unsuccessful or patient reports new pain  - Anticipate increased pain with activity and pre-medicate as appropriate  Outcome: Progressing     Problem: RESPIRATORY - ADULT  Goal: Achieves optimal ventilation and oxygenation  Description: INTERVENTIONS:  - Assess for changes in respiratory status  - Assess for changes in mentation and behavior  - Position to facilitate oxygenation and minimize respiratory effort  - Oxygen supplementation based on oxygen saturation or ABGs  - Provide Smoking Cessation handout, if applicable  - Encourage broncho-pulmonary hygiene including cough, deep breathe, Incentive Spirometry  - Assess the need for suctioning and perform as needed  - Assess and instruct to report SOB or any respiratory difficulty  - Respiratory Therapy support as indicated  - Manage/alleviate anxiety  - Monitor for signs/symptoms of CO2 retention  Outcome: Progressing     Problem: Patient/Family Goals  Goal: Patient/Family Long Term Goal  Description: Patient's Long Term Goal: stay out of hospital 5-5    Interventions:  - med compliance, follow ups  - monitor kidney function        - See additional Care Plan goals for specific interventions  Outcome: Progressing  Goal: Patient/Family Short Term Goal  Description: Patient's Short Term Goal: no pain 5-5  Talk with doctors about discharge 5-6    Interventions:   - PRN meds, hot/cold packs, tell nurse if in pain   - monitor BP/ kidney labs/ ABD pain     - See additional Care Plan goals for specific interventions  Outcome: Progressing

## 2023-05-08 NOTE — PLAN OF CARE
Assumed care for pt at 19:30 on 5/7    A&Ox4. Denies abd pain. Requested melatonin for sleep. VSS on RA. NSR on tele. Continent, discussed using urinal for I&O tracking. Up ad osvaldo. Eliquis given. Plan: Daily weight, I&O, zosyn, monitor renal function    Call light in reach, able to make needs known.

## 2023-05-09 VITALS
SYSTOLIC BLOOD PRESSURE: 138 MMHG | TEMPERATURE: 98 F | DIASTOLIC BLOOD PRESSURE: 85 MMHG | WEIGHT: 261 LBS | HEART RATE: 78 BPM | OXYGEN SATURATION: 93 % | RESPIRATION RATE: 16 BRPM | BODY MASS INDEX: 34 KG/M2

## 2023-05-09 LAB
ALBUMIN SERPL-MCNC: 3.2 G/DL (ref 3.4–5)
ALBUMIN/GLOB SERPL: 0.8 {RATIO} (ref 1–2)
ALP LIVER SERPL-CCNC: 85 U/L
ALT SERPL-CCNC: 17 U/L
ANION GAP SERPL CALC-SCNC: 4 MMOL/L (ref 0–18)
AST SERPL-CCNC: 19 U/L (ref 15–37)
BILIRUB SERPL-MCNC: 0.4 MG/DL (ref 0.1–2)
BUN BLD-MCNC: 28 MG/DL (ref 7–18)
CALCIUM BLD-MCNC: 8.5 MG/DL (ref 8.5–10.1)
CHLORIDE SERPL-SCNC: 116 MMOL/L (ref 98–112)
CO2 SERPL-SCNC: 20 MMOL/L (ref 21–32)
CREAT BLD-MCNC: 3.74 MG/DL
ERYTHROCYTE [DISTWIDTH] IN BLOOD BY AUTOMATED COUNT: 13.7 %
GFR SERPLBLD BASED ON 1.73 SQ M-ARVRAT: 19 ML/MIN/1.73M2 (ref 60–?)
GLOBULIN PLAS-MCNC: 4 G/DL (ref 2.8–4.4)
GLUCOSE BLD-MCNC: 115 MG/DL (ref 70–99)
GLUCOSE BLD-MCNC: 91 MG/DL (ref 70–99)
GLUCOSE BLD-MCNC: 94 MG/DL (ref 70–99)
HCT VFR BLD AUTO: 33.3 %
HGB BLD-MCNC: 11.1 G/DL
MAGNESIUM SERPL-MCNC: 2.2 MG/DL (ref 1.6–2.6)
MCH RBC QN AUTO: 30.9 PG (ref 26–34)
MCHC RBC AUTO-ENTMCNC: 33.3 G/DL (ref 31–37)
MCV RBC AUTO: 92.8 FL
OSMOLALITY SERPL CALC.SUM OF ELEC: 295 MOSM/KG (ref 275–295)
PLATELET # BLD AUTO: 232 10(3)UL (ref 150–450)
POTASSIUM SERPL-SCNC: 3.6 MMOL/L (ref 3.5–5.1)
PROT SERPL-MCNC: 7.2 G/DL (ref 6.4–8.2)
RBC # BLD AUTO: 3.59 X10(6)UL
SODIUM SERPL-SCNC: 140 MMOL/L (ref 136–145)
WBC # BLD AUTO: 7.7 X10(3) UL (ref 4–11)

## 2023-05-09 PROCEDURE — 83735 ASSAY OF MAGNESIUM: CPT | Performed by: HOSPITALIST

## 2023-05-09 PROCEDURE — 80053 COMPREHEN METABOLIC PANEL: CPT | Performed by: HOSPITALIST

## 2023-05-09 PROCEDURE — 82962 GLUCOSE BLOOD TEST: CPT

## 2023-05-09 PROCEDURE — 85027 COMPLETE CBC AUTOMATED: CPT | Performed by: HOSPITALIST

## 2023-05-09 RX ORDER — ARIPIPRAZOLE 5 MG/1
15 TABLET ORAL DAILY
Qty: 42 TABLET | Refills: 0 | Status: SHIPPED | OUTPATIENT
Start: 2023-05-09 | End: 2023-05-23

## 2023-05-09 RX ORDER — TORSEMIDE 20 MG/1
20 TABLET ORAL DAILY PRN
Qty: 30 TABLET | Refills: 0 | Status: SHIPPED | OUTPATIENT
Start: 2023-05-09

## 2023-05-09 RX ORDER — NIFEDIPINE 60 MG/1
60 TABLET, FILM COATED, EXTENDED RELEASE ORAL NIGHTLY
Qty: 30 TABLET | Refills: 0 | Status: SHIPPED | OUTPATIENT
Start: 2023-05-09

## 2023-05-09 RX ORDER — HYDRALAZINE HYDROCHLORIDE 25 MG/1
25 TABLET, FILM COATED ORAL 3 TIMES DAILY PRN
Qty: 30 TABLET | Refills: 0 | Status: SHIPPED | OUTPATIENT
Start: 2023-05-09

## 2023-05-09 RX ORDER — ISOSORBIDE MONONITRATE 60 MG/1
60 TABLET, EXTENDED RELEASE ORAL DAILY
Qty: 30 TABLET | Refills: 0 | Status: SHIPPED | OUTPATIENT
Start: 2023-05-10

## 2023-05-09 RX ORDER — CLONIDINE HYDROCHLORIDE 0.1 MG/1
0.1 TABLET ORAL 2 TIMES DAILY
Qty: 60 TABLET | Refills: 0 | Status: SHIPPED | OUTPATIENT
Start: 2023-05-09

## 2023-05-09 NOTE — PLAN OF CARE
Pt declines complaints of pain, malaise, or cardiac symptoms. A&Ox4, lungs diminished with equal expansion, on rm air. Pt in NSR, on monitor. Abdomen soft and non-tender with active bowel sounds in all 4 quadrants, continent of B&B. Patient updated with plan of care. 1030: Dr. Marce Salinas paged about K+3.6 and BPA order electrolyte replacement pop up. Per Dr. Marce Salinas- no need to replace K+ at 3.6 today.      Problem: PAIN - ADULT  Goal: Verbalizes/displays adequate comfort level or patient's stated pain goal  Description: INTERVENTIONS:  - Encourage pt to monitor pain and request assistance  - Assess pain using appropriate pain scale  - Administer analgesics based on type and severity of pain and evaluate response  - Implement non-pharmacological measures as appropriate and evaluate response  - Consider cultural and social influences on pain and pain management  - Manage/alleviate anxiety  - Utilize distraction and/or relaxation techniques  - Monitor for opioid side effects  - Notify MD/LIP if interventions unsuccessful or patient reports new pain  - Anticipate increased pain with activity and pre-medicate as appropriate  Outcome: Progressing     Problem: RESPIRATORY - ADULT  Goal: Achieves optimal ventilation and oxygenation  Description: INTERVENTIONS:  - Assess for changes in respiratory status  - Assess for changes in mentation and behavior  - Position to facilitate oxygenation and minimize respiratory effort  - Oxygen supplementation based on oxygen saturation or ABGs  - Provide Smoking Cessation handout, if applicable  - Encourage broncho-pulmonary hygiene including cough, deep breathe, Incentive Spirometry  - Assess the need for suctioning and perform as needed  - Assess and instruct to report SOB or any respiratory difficulty  - Respiratory Therapy support as indicated  - Manage/alleviate anxiety  - Monitor for signs/symptoms of CO2 retention  Outcome: Progressing     Problem: Patient/Family Goals  Goal: Patient/Family Long Term Goal  Description: Patient's Long Term Goal: \"go home\"    Interventions:  -Mds to see  -Labs  - See additional Care Plan goals for specific interventions  Outcome: Progressing  Goal: Patient/Family Short Term Goal  Description: Patient's Short Term Goal: \"stay out of hospital\"    Interventions:   -Labs  -Med compliance  -Home health   - See additional Care Plan goals for specific interventions  Outcome: Progressing

## 2023-05-09 NOTE — PLAN OF CARE
Patient alert and oriented x 4. NSR on tele monitor. Maintaining O2 saturation WNL on room air. Patient is continent of bladder and bowel. Up and osvaldo. No complains of pain. Safety precautions in place, call light within reach. Will continue to monitor.        Problem: PAIN - ADULT  Goal: Verbalizes/displays adequate comfort level or patient's stated pain goal  Description: INTERVENTIONS:  - Encourage pt to monitor pain and request assistance  - Assess pain using appropriate pain scale  - Administer analgesics based on type and severity of pain and evaluate response  - Implement non-pharmacological measures as appropriate and evaluate response  - Consider cultural and social influences on pain and pain management  - Manage/alleviate anxiety  - Utilize distraction and/or relaxation techniques  - Monitor for opioid side effects  - Notify MD/LIP if interventions unsuccessful or patient reports new pain  - Anticipate increased pain with activity and pre-medicate as appropriate  Outcome: Progressing     Problem: RESPIRATORY - ADULT  Goal: Achieves optimal ventilation and oxygenation  Description: INTERVENTIONS:  - Assess for changes in respiratory status  - Assess for changes in mentation and behavior  - Position to facilitate oxygenation and minimize respiratory effort  - Oxygen supplementation based on oxygen saturation or ABGs  - Provide Smoking Cessation handout, if applicable  - Encourage broncho-pulmonary hygiene including cough, deep breathe, Incentive Spirometry  - Assess the need for suctioning and perform as needed  - Assess and instruct to report SOB or any respiratory difficulty  - Respiratory Therapy support as indicated  - Manage/alleviate anxiety  - Monitor for signs/symptoms of CO2 retention  Outcome: Progressing     Problem: Patient/Family Goals  Goal: Patient/Family Long Term Goal  Description: Patient's Long Term Goal: stay out of hospital 5-5    Interventions:  - med compliance, follow ups  - monitor kidney function        - See additional Care Plan goals for specific interventions  Outcome: Progressing  Goal: Patient/Family Short Term Goal  Description: Patient's Short Term Goal: no pain 5-5  Talk with doctors about discharge 5-6    Interventions:   - PRN meds, hot/cold packs, tell nurse if in pain   - monitor BP/ kidney labs/ ABD pain     - See additional Care Plan goals for specific interventions  Outcome: Progressing
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
## 2023-05-10 NOTE — PAYOR COMM NOTE
Discharge Notification    Patient Name: Robyn Medellin #: [id number]  Authorization Number: [id number]  Admit Date/Time: 5/2/2023 3:18 PM  Discharge Date/Time: 5/9/2023 5:30 PM

## 2023-05-12 NOTE — CM/SW NOTE
Received call from [facility]- notified them that pt was dc'd on 5/9. AVS sent to [facility].     Armando Encarnacion LCSW  /Discharge Planner

## 2023-05-17 ENCOUNTER — HOSPITAL ENCOUNTER (EMERGENCY)
Age: 43
Discharge: HOME | End: 2023-05-17
Attending: EMERGENCY MEDICINE
Payer: MEDICAID

## 2023-05-17 VITALS
SYSTOLIC BLOOD PRESSURE: 122 MMHG | BODY MASS INDEX: 32.73 KG/M2 | WEIGHT: 255 LBS | HEIGHT: 74 IN | DIASTOLIC BLOOD PRESSURE: 72 MMHG | RESPIRATION RATE: 18 BRPM | HEART RATE: 96 BPM | OXYGEN SATURATION: 98 % | TEMPERATURE: 99 F

## 2023-05-17 DIAGNOSIS — Z76.0 ENCOUNTER FOR MEDICATION REFILL: ICD-10-CM

## 2023-05-17 DIAGNOSIS — K57.92 ACUTE DIVERTICULITIS: Primary | ICD-10-CM

## 2023-05-17 PROCEDURE — 96374 THER/PROPH/DIAG INJ IV PUSH: CPT

## 2023-05-17 PROCEDURE — 99284 EMERGENCY DEPT VISIT MOD MDM: CPT

## 2023-05-17 RX ORDER — AMOXICILLIN AND CLAVULANATE POTASSIUM 875; 125 MG/1; MG/1
1 TABLET, FILM COATED ORAL 2 TIMES DAILY
Qty: 20 TABLET | Refills: 0 | Status: SHIPPED | OUTPATIENT
Start: 2023-05-17 | End: 2023-05-26

## 2023-05-17 RX ORDER — TRAMADOL HYDROCHLORIDE 50 MG/1
TABLET ORAL EVERY 6 HOURS PRN
Qty: 10 TABLET | Refills: 0 | Status: SHIPPED | OUTPATIENT
Start: 2023-05-17 | End: 2023-05-26

## 2023-05-17 RX ORDER — FLUOXETINE HYDROCHLORIDE 40 MG/1
40 CAPSULE ORAL DAILY
Qty: 20 CAPSULE | Refills: 0 | Status: ON HOLD | OUTPATIENT
Start: 2023-05-17 | End: 2023-05-23

## 2023-05-17 RX ORDER — ARIPIPRAZOLE 15 MG/1
45 TABLET, ORALLY DISINTEGRATING ORAL DAILY
Qty: 60 TABLET | Refills: 0 | Status: ON HOLD | OUTPATIENT
Start: 2023-05-17 | End: 2023-05-23

## 2023-05-17 RX ORDER — LAMOTRIGINE 150 MG/1
150 TABLET ORAL DAILY
Qty: 20 TABLET | Refills: 0 | Status: ON HOLD | OUTPATIENT
Start: 2023-05-17 | End: 2023-05-23

## 2023-05-17 RX ORDER — AMOXICILLIN 875 MG/1
875 TABLET, COATED ORAL 2 TIMES DAILY
Qty: 20 TABLET | Refills: 0 | Status: SHIPPED | OUTPATIENT
Start: 2023-05-17 | End: 2023-05-17

## 2023-05-17 RX ORDER — KETOROLAC TROMETHAMINE 30 MG/ML
30 INJECTION, SOLUTION INTRAMUSCULAR; INTRAVENOUS ONCE
Status: COMPLETED | OUTPATIENT
Start: 2023-05-17 | End: 2023-05-17

## 2023-05-17 RX ORDER — AMOXICILLIN AND CLAVULANATE POTASSIUM 875; 125 MG/1; MG/1
1 TABLET, FILM COATED ORAL ONCE
Status: COMPLETED | OUTPATIENT
Start: 2023-05-17 | End: 2023-05-17

## 2023-05-18 NOTE — DISCHARGE INSTRUCTIONS
Take 1000 mg acetaminophen (2 Tylenol tablets) every 6 hours as needed for pain. We cannot continue to refill your medications in the emergency department. Please follow-up at [facility] to be assigned a psychiatrist/psychologist.    Drink plenty of fluids - especially low-calorie sports drinks like Gatorade.

## 2023-05-20 NOTE — DISCHARGE SUMMARY
General Medicine Discharge Summary     Patient ID:  Godwin Fonseca  43 year old  4/12/1980    Admit date: 4/14/2023    Discharge date and time: 4/21/2023     Attending Physician: No att. providers found     Primary Care Physician: Jean Chung MD     Discharge Diagnoses: SOB (shortness of breath) [R06.02]  Chronic right shoulder pain [M25.511, G89.29]  Hypertensive emergency [I16.1]  Nonintractable headache, unspecified chronicity pattern, unspecified headache type [R51.9]    Primary Diagnosis at discharge from Hospital: Other: hypertensive urgency; no TCM follow-up needed    Please note that only [facility] DMG and EMG patients enrolled in the Medicare ACO, [facility] ACO and [facility] HMOs will be handled by the [facility] Care Management team.  For all other patients, please follow usual protocol for discharge care transition.    Secondary Diagnoses:    Acute on chronic abdominal pain  Hypertensive urgency  Troponin elevation  CAD  ROBERT on CKD 4  COPD  History of DVT/PE  Depression/bipolar disorder       Risk of readmission: Godwin Fonseca has High Risk of readmission after discharge from the hospital.    Discharge Condition: stable    Disposition: home      Important Follow up:  - PCP within   - Consults:     Ajay Samayoa MD  [address] [facility] [address]  [facility] [address]  [phone]    Follow up in 1 week(s)  Follow up in 1-2 weeks to review your blood tests    Jean Chung MD  [address] [facility] [address]  [phone]    Follow up in 1 week(s)      Albert Luong MD  [address]  [facility] [address]  [facility] [address]  [phone]    Follow up in 2 day(s)  Follow up within 2 weeks to review ADHD medication    Niesha Wilson APRN  [address]  [facility] [id number] [phone]    Go to  scheduled appotintment, Thursday, 4/27,2023 at 1 pm    Yeny Franks, DO  [address] [facility]  [address]  [facility] [address]  [phone]    Follow up in 2 week(s)      -  Labs: n/a  - Radiology: n/a    Hospital Course:      For further details, please see daily progress notes. In brief,      Godwin Fonseca Is a a 43 year old male who presents with hypertensive urgency     Problem List / Diagnoses     Acute on chronic abdominal pain  Hypertensive urgency  Troponin elevation  CAD  ROBERT on CKD 4  COPD  History of DVT/PE  Depression/bipolar disorder     Plan     Acute on chronic abdominal pain  Suspected opiate-induced constipation  - Patient reports intermittent cramping abdominal pain  - Assessment documented symptom for several months and has had extensive GI work-up with unremarkable findings  - CT abdomen performed 4/5/23, 3/23/23, 3/21/23, 7/10/2022 have all been performed for abdominal pain with no specific etiology for his abdominal pain identified  -I personally reviewed these images and 1, finding is significant stool burden  - Suspect opioid-induced constipation; continue MiraLAX, Colace.  Relistor started 4/17, continue  - bisacodyl suppository prn for constipation   - Avoid further opioids      Hypertensive urgency-resolved  -Improved following IV labetalol, continue as needed for SBP greater than 180 or DBP greater than 105 improved  - Cardiology, nephrology consulted  - Resume home antihypertensives, titration per cardiology save for losartan/torsemide (below)  -4/20: BP controlled on day of discahrge, d/w Cardiology & nephrology who are in agreement with discharge      CAD  History NSTEMI  Troponin elevation  -Likely demand ischemia secondary to uncontrolled BP in the setting of CKD  - Had angiogram last admission that showed nonobstructive CAD  -Cardiology following per above  -Resumed home atorvastatin, Coreg; losartan on hold given ROBERT     ROBERT on CKD 4 -- improving   Hyperaldosteronism  - Creatinine 4.4 on admission, recent baseline 2.8-3.6  - Suspect secondary to uncontrolled hypertension  -Renally dose all meds, avoid nephrotoxic agents  - Nephrology consulted, plan  discussed  --No significant improvement this admission despite holding ARB/diuretic & improved BP control   -Hold home losartan, torsemide for now, resume as able per nephrology  -- has been having some urinary retention, will start flomax  -- Cr improving 4/19-20, resumed eplerenone on discharge      COPD  -No evidence of exacerbation on exam, resume home Flonase, albuterol     History of DVT/PE  -Resume home apixaban     Depression/bipolar disorder  -Resume home fluoxetine, Lamictal, aripiprazole     Adult ADHD  -- will stop adderall and provide 30 day rx for vyvanse on discharge. Patient informed he must follow up with a psychiatrist for further refills, verbalizes understanding.       Consults: IP CONSULT TO CARDIOLOGY  IP CONSULT TO NEPHROLOGY  IP CONSULT TO SOCIAL WORK  IP CONSULT TO VASCULAR ACCESS TEAM    Operative Procedures:        Patient instructions:      All medications personally reviewed and reconciled on day of discharge.     Discharge Medication List as of 4/21/2023  2:36 PM    START taking these medications    bisacodyl 10 MG Rectal Suppos  Place 1 suppository (10 mg total) rectally daily as needed., Normal, Disp-10 suppository, R-1    lisdexamfetamine (VYVANSE) 50 MG Oral Cap  Take 1 capsule (50 mg total) by mouth every morning., Print Script, Disp-30 capsule, R-0      CONTINUE these medications which have CHANGED    !! NIFEdipine ER 60 MG Oral Tablet 24 Hr  Take 1 tablet (60 mg total) by mouth every morning., Normal, Disp-90 tablet, R-3    !! NIFEdipine ER 30 MG Oral Tablet 24 Hr  Take 1 tablet (30 mg total) by mouth every evening., Normal, Disp-90 tablet, R-3    isosorbide mononitrate ER 30 MG Oral Tablet 24 Hr  Take 1 tablet (30 mg total) by mouth daily., Normal, Disp-90 tablet, R-3    lamoTRIgine (LAMICTAL) 150 MG Oral Tab  Take 1 tablet (150 mg total) by mouth daily., Normal, Disp-30 tablet, R-0    !! - Potential duplicate medications found. Please discuss with provider.      CONTINUE these  medications which have NOT CHANGED    sodium bicarbonate 650 MG Oral Tab  Take 1 tablet (650 mg total) by mouth 3 (three) times daily., Print Script, Disp-90 tablet, R-0    ARIPiprazole 5 MG Oral Tab  Take 3 tablets (15 mg total) by mouth daily for 14 days., Print Script, Disp-42 tablet, R-0    ergocalciferol 1.25 MG (32819 UT) Oral Cap  Take 1 capsule (50,000 Units total) by mouth Every Monday., Historical    traMADol 50 MG Oral Tab  Take 1 tablet (50 mg total) by mouth every 12 (twelve) hours as needed., Print Script, Disp-15 tablet, R-0    atorvastatin 20 MG Oral Tab  atorvastatin 20 mg tablet   TAKE 1 TABLET BY MOUTH DAILY IN THE EVENING, Historical    apixaban (ELIQUIS) 5 MG Oral Tab  Take 1 tablet (5 mg total) by mouth 2 (two) times daily., Historical    dicyclomine 20 MG Oral Tab  Take 1 tablet (20 mg total) by mouth 3 (three) times daily., Normal, Disp-20 tablet, R-0    carvedilol 25 MG Oral Tab  Take 1 tablet (25 mg total) by mouth in the morning and 1 tablet (25 mg total) in the evening. Take with meals., Normal, Disp-60 tablet, R-6    FLUoxetine HCl 40 MG Oral Cap  Take 1 capsule (40 mg total) by mouth daily., Normal, Disp-30 capsule, R-0    Tiotropium Bromide Monohydrate (SPIRIVA HANDIHALER) 18 MCG Inhalation Cap  1 capsule by inhaling the contents of the capsule using the HandiHaler device, Historical    albuterol 108 (90 Base) MCG/ACT Inhalation Aero Soln  albuterol sulfate HFA 90 mcg/actuation aerosol inhaler   INHALE 1 TO 2 PUFFS BY MOUTH EVERY 4-6 HOURS AS NEEDED FOR COUGH OR WHEEZING, Historical    Fenofibrate 134 MG Oral Cap  Take 1 capsule by mouth nightly., Normal, Disp-90 capsule, R-1    TRULICITY 0.75 MG/0.5ML Subcutaneous Solution Pen-injector  once a week. EVERY MONDAY, Historical, SAMPSON    acetaminophen 500 MG Oral Tab  Take 1 tablet (500 mg total) by mouth every 6 (six) hours as needed for Pain., Historical    Fluticasone Propionate 50 MCG/ACT Nasal Suspension  SPRAY ONCE INTO EACH NOSTRIL  BID PRN, Normal, Disp-15.8 mL, R-0      STOP taking these medications    torsemide 20 MG Oral Tab    amphetamine-dextroamphetamine (ADDERALL) 20 MG Oral Tab    losartan 50 MG Oral Tab          Activity: activity as tolerated  Diet: diabetic diet and low salt diet  Wound Care: as directed  Code Status: Full Code      Exam on day of discharge:      04/21/23  1235   BP: 156/90   Pulse: 94   Resp: 16   Temp: 98.1 °F (36.7 °C)       General: nad, comfortable, nontoxic   Heart: RRR, no murmurs appreciated   Lungs: clear bilaterally, reg resp rate & effort, no wheezes/crackles   Abdomen: soft, ntnd, no guarding/rebound   Extremities: WWP, no PAMELA   Neuro: CN inact, no focal deficits      Total time coordinating care for discharge: 35 minutes    JEFF Lopez MD  [facility] Hospitalist  [phone]

## 2023-05-22 ENCOUNTER — APPOINTMENT (OUTPATIENT)
Dept: GENERAL RADIOLOGY | Age: 43
DRG: 281 | End: 2023-05-22
Attending: EMERGENCY MEDICINE
Payer: MEDICAID

## 2023-05-22 ENCOUNTER — APPOINTMENT (OUTPATIENT)
Dept: CT IMAGING | Age: 43
End: 2023-05-22
Attending: EMERGENCY MEDICINE
Payer: MEDICAID

## 2023-05-22 ENCOUNTER — APPOINTMENT (OUTPATIENT)
Dept: GENERAL RADIOLOGY | Age: 43
End: 2023-05-22
Attending: EMERGENCY MEDICINE
Payer: MEDICAID

## 2023-05-22 ENCOUNTER — HOSPITAL ENCOUNTER (INPATIENT)
Facility: HOSPITAL | Age: 43
LOS: 4 days | Discharge: HOME | End: 2023-05-26
Attending: EMERGENCY MEDICINE | Admitting: HOSPITALIST
Payer: MEDICAID

## 2023-05-22 ENCOUNTER — HOSPITAL ENCOUNTER (INPATIENT)
Facility: HOSPITAL | Age: 43
LOS: 4 days | Discharge: HOME | DRG: 281 | End: 2023-05-26
Attending: EMERGENCY MEDICINE | Admitting: HOSPITALIST
Payer: MEDICAID

## 2023-05-22 ENCOUNTER — APPOINTMENT (OUTPATIENT)
Dept: CT IMAGING | Age: 43
DRG: 281 | End: 2023-05-22
Attending: EMERGENCY MEDICINE
Payer: MEDICAID

## 2023-05-22 DIAGNOSIS — I16.1 HYPERTENSIVE EMERGENCY: Primary | ICD-10-CM

## 2023-05-22 LAB
ALBUMIN SERPL-MCNC: 3.3 G/DL (ref 3.4–5)
ALBUMIN/GLOB SERPL: 0.8 {RATIO} (ref 1–2)
ALP LIVER SERPL-CCNC: 106 U/L
ALT SERPL-CCNC: 31 U/L
ANION GAP SERPL CALC-SCNC: 6 MMOL/L (ref 0–18)
APTT PPP: 25 SECONDS (ref 23.3–35.6)
AST SERPL-CCNC: 111 U/L (ref 15–37)
BASOPHILS # BLD AUTO: 0.15 X10(3) UL (ref 0–0.2)
BASOPHILS NFR BLD AUTO: 1.3 %
BILIRUB SERPL-MCNC: 0.9 MG/DL (ref 0.1–2)
BUN BLD-MCNC: 54 MG/DL (ref 7–18)
CALCIUM BLD-MCNC: 8.8 MG/DL (ref 8.5–10.1)
CHLORIDE SERPL-SCNC: 109 MMOL/L (ref 98–112)
CHOLEST SERPL-MCNC: 219 MG/DL (ref ?–200)
CO2 SERPL-SCNC: 21 MMOL/L (ref 21–32)
CREAT BLD-MCNC: 5.42 MG/DL
EOSINOPHIL # BLD AUTO: 0.47 X10(3) UL (ref 0–0.7)
EOSINOPHIL NFR BLD AUTO: 4 %
ERYTHROCYTE [DISTWIDTH] IN BLOOD BY AUTOMATED COUNT: 15.1 %
GFR SERPLBLD BASED ON 1.73 SQ M-ARVRAT: 12 ML/MIN/1.73M2 (ref 60–?)
GLOBULIN PLAS-MCNC: 4.2 G/DL (ref 2.8–4.4)
GLUCOSE BLD-MCNC: 173 MG/DL (ref 70–99)
HCT VFR BLD AUTO: 31.7 %
HDLC SERPL-MCNC: 48 MG/DL (ref 40–59)
HGB BLD-MCNC: 10.8 G/DL
IMM GRANULOCYTES # BLD AUTO: 0.05 X10(3) UL (ref 0–1)
IMM GRANULOCYTES NFR BLD: 0.4 %
INR BLD: 0.97 (ref 0.85–1.16)
LACTATE SERPL-SCNC: 1.1 MMOL/L (ref 0.4–2)
LDLC SERPL CALC-MCNC: 118 MG/DL (ref ?–100)
LIPASE SERPL-CCNC: 72 U/L (ref 13–75)
LYMPHOCYTES # BLD AUTO: 1.23 X10(3) UL (ref 1–4)
LYMPHOCYTES NFR BLD AUTO: 10.5 %
MCH RBC QN AUTO: 31.4 PG (ref 26–34)
MCHC RBC AUTO-ENTMCNC: 34.1 G/DL (ref 31–37)
MCV RBC AUTO: 92.2 FL
MONOCYTES # BLD AUTO: 1.02 X10(3) UL (ref 0.1–1)
MONOCYTES NFR BLD AUTO: 8.7 %
NEUTROPHILS # BLD AUTO: 8.77 X10 (3) UL (ref 1.5–7.7)
NEUTROPHILS # BLD AUTO: 8.77 X10(3) UL (ref 1.5–7.7)
NEUTROPHILS NFR BLD AUTO: 75.1 %
NONHDLC SERPL-MCNC: 171 MG/DL (ref ?–130)
NT-PROBNP SERPL-MCNC: ABNORMAL PG/ML (ref ?–125)
OSMOLALITY SERPL CALC.SUM OF ELEC: 301 MOSM/KG (ref 275–295)
PLATELET # BLD AUTO: 306 10(3)UL (ref 150–450)
POTASSIUM SERPL-SCNC: 4.2 MMOL/L (ref 3.5–5.1)
PROCALCITONIN SERPL-MCNC: 0.42 NG/ML (ref ?–0.16)
PROT SERPL-MCNC: 7.5 G/DL (ref 6.4–8.2)
PROTHROMBIN TIME: 12.9 SECONDS (ref 11.6–14.8)
RBC # BLD AUTO: 3.44 X10(6)UL
SARS-COV-2 RNA RESP QL NAA+PROBE: NOT DETECTED
SODIUM SERPL-SCNC: 136 MMOL/L (ref 136–145)
TRIGL SERPL-MCNC: 304 MG/DL (ref 30–149)
TROPONIN I HIGH SENSITIVITY: 1329 NG/L
TROPONIN I HIGH SENSITIVITY: 1425 NG/L
VLDLC SERPL CALC-MCNC: 54 MG/DL (ref 0–30)
WBC # BLD AUTO: 11.7 X10(3) UL (ref 4–11)

## 2023-05-22 PROCEDURE — 70450 CT HEAD/BRAIN W/O DYE: CPT | Performed by: EMERGENCY MEDICINE

## 2023-05-22 PROCEDURE — 85730 THROMBOPLASTIN TIME PARTIAL: CPT | Performed by: EMERGENCY MEDICINE

## 2023-05-22 PROCEDURE — 96376 TX/PRO/DX INJ SAME DRUG ADON: CPT

## 2023-05-22 PROCEDURE — 80061 LIPID PANEL: CPT | Performed by: EMERGENCY MEDICINE

## 2023-05-22 PROCEDURE — 96375 TX/PRO/DX INJ NEW DRUG ADDON: CPT

## 2023-05-22 PROCEDURE — 87040 BLOOD CULTURE FOR BACTERIA: CPT | Performed by: EMERGENCY MEDICINE

## 2023-05-22 PROCEDURE — 85025 COMPLETE CBC W/AUTO DIFF WBC: CPT | Performed by: EMERGENCY MEDICINE

## 2023-05-22 PROCEDURE — 96368 THER/DIAG CONCURRENT INF: CPT

## 2023-05-22 PROCEDURE — 84484 ASSAY OF TROPONIN QUANT: CPT | Performed by: EMERGENCY MEDICINE

## 2023-05-22 PROCEDURE — 99291 CRITICAL CARE FIRST HOUR: CPT

## 2023-05-22 PROCEDURE — 36415 COLL VENOUS BLD VENIPUNCTURE: CPT

## 2023-05-22 PROCEDURE — 83880 ASSAY OF NATRIURETIC PEPTIDE: CPT | Performed by: EMERGENCY MEDICINE

## 2023-05-22 PROCEDURE — 93010 ELECTROCARDIOGRAM REPORT: CPT

## 2023-05-22 PROCEDURE — 85610 PROTHROMBIN TIME: CPT | Performed by: EMERGENCY MEDICINE

## 2023-05-22 PROCEDURE — 93005 ELECTROCARDIOGRAM TRACING: CPT

## 2023-05-22 PROCEDURE — 96365 THER/PROPH/DIAG IV INF INIT: CPT

## 2023-05-22 PROCEDURE — 99285 EMERGENCY DEPT VISIT HI MDM: CPT

## 2023-05-22 PROCEDURE — 83605 ASSAY OF LACTIC ACID: CPT | Performed by: EMERGENCY MEDICINE

## 2023-05-22 PROCEDURE — 84145 PROCALCITONIN (PCT): CPT | Performed by: EMERGENCY MEDICINE

## 2023-05-22 PROCEDURE — 80053 COMPREHEN METABOLIC PANEL: CPT | Performed by: EMERGENCY MEDICINE

## 2023-05-22 PROCEDURE — 96366 THER/PROPH/DIAG IV INF ADDON: CPT

## 2023-05-22 PROCEDURE — 83690 ASSAY OF LIPASE: CPT | Performed by: EMERGENCY MEDICINE

## 2023-05-22 PROCEDURE — 71045 X-RAY EXAM CHEST 1 VIEW: CPT | Performed by: EMERGENCY MEDICINE

## 2023-05-22 RX ORDER — CLONIDINE HYDROCHLORIDE 0.1 MG/1
0.1 TABLET ORAL ONCE
Status: COMPLETED | OUTPATIENT
Start: 2023-05-22 | End: 2023-05-22

## 2023-05-22 RX ORDER — NITROGLYCERIN 20 MG/100ML
INJECTION INTRAVENOUS
Status: DISCONTINUED | OUTPATIENT
Start: 2023-05-22 | End: 2023-05-23

## 2023-05-22 RX ORDER — NITROGLYCERIN 0.4 MG/1
0.4 TABLET SUBLINGUAL EVERY 5 MIN PRN
Status: DISCONTINUED | OUTPATIENT
Start: 2023-05-22 | End: 2023-05-26

## 2023-05-22 RX ORDER — ACETAMINOPHEN 325 MG/1
650 TABLET ORAL EVERY 6 HOURS PRN
Status: DISCONTINUED | OUTPATIENT
Start: 2023-05-22 | End: 2023-05-26

## 2023-05-22 RX ORDER — SENNOSIDES 8.6 MG
17.2 TABLET ORAL NIGHTLY PRN
Status: DISCONTINUED | OUTPATIENT
Start: 2023-05-22 | End: 2023-05-26

## 2023-05-22 RX ORDER — LORAZEPAM 2 MG/ML
INJECTION INTRAMUSCULAR EVERY 10 MIN PRN
Status: DISCONTINUED | OUTPATIENT
Start: 2023-05-22 | End: 2023-05-24

## 2023-05-22 RX ORDER — HYDRALAZINE HYDROCHLORIDE 20 MG/ML
INJECTION INTRAMUSCULAR; INTRAVENOUS ONCE
Status: COMPLETED | OUTPATIENT
Start: 2023-05-22 | End: 2023-05-22

## 2023-05-22 RX ORDER — BISACODYL 10 MG
10 SUPPOSITORY, RECTAL RECTAL
Status: DISCONTINUED | OUTPATIENT
Start: 2023-05-22 | End: 2023-05-26

## 2023-05-22 RX ORDER — POLYETHYLENE GLYCOL 3350 17 G/17G
17 POWDER, FOR SOLUTION ORAL DAILY PRN
Status: DISCONTINUED | OUTPATIENT
Start: 2023-05-22 | End: 2023-05-26

## 2023-05-22 RX ORDER — HYDRALAZINE HYDROCHLORIDE 20 MG/ML
10 INJECTION INTRAMUSCULAR; INTRAVENOUS ONCE
Status: COMPLETED | OUTPATIENT
Start: 2023-05-22 | End: 2023-05-22

## 2023-05-22 RX ORDER — NITROGLYCERIN 0.4 MG/1
0.4 TABLET SUBLINGUAL ONCE
Status: COMPLETED | OUTPATIENT
Start: 2023-05-22 | End: 2023-05-22

## 2023-05-22 RX ORDER — HYDROMORPHONE HYDROCHLORIDE 1 MG/ML
INJECTION, SOLUTION INTRAMUSCULAR; INTRAVENOUS; SUBCUTANEOUS EVERY 30 MIN PRN
Status: COMPLETED | OUTPATIENT
Start: 2023-05-22 | End: 2023-05-22

## 2023-05-22 RX ORDER — HYDROMORPHONE HYDROCHLORIDE 1 MG/ML
0.4 INJECTION, SOLUTION INTRAMUSCULAR; INTRAVENOUS; SUBCUTANEOUS EVERY 2 HOUR PRN
Status: DISCONTINUED | OUTPATIENT
Start: 2023-05-22 | End: 2023-05-24

## 2023-05-22 RX ORDER — HEPARIN SODIUM 5000 [USP'U]/ML
5000 INJECTION, SOLUTION INTRAVENOUS; SUBCUTANEOUS EVERY 8 HOURS SCHEDULED
Status: DISCONTINUED | OUTPATIENT
Start: 2023-05-22 | End: 2023-05-23

## 2023-05-22 RX ORDER — ONDANSETRON 2 MG/ML
4 INJECTION INTRAMUSCULAR; INTRAVENOUS ONCE
Status: COMPLETED | OUTPATIENT
Start: 2023-05-22 | End: 2023-05-22

## 2023-05-22 RX ORDER — ONDANSETRON 2 MG/ML
4 INJECTION INTRAMUSCULAR; INTRAVENOUS EVERY 6 HOURS PRN
Status: DISCONTINUED | OUTPATIENT
Start: 2023-05-22 | End: 2023-05-26

## 2023-05-22 RX ORDER — HYDROMORPHONE HYDROCHLORIDE 1 MG/ML
0.2 INJECTION, SOLUTION INTRAMUSCULAR; INTRAVENOUS; SUBCUTANEOUS EVERY 2 HOUR PRN
Status: DISCONTINUED | OUTPATIENT
Start: 2023-05-22 | End: 2023-05-24

## 2023-05-22 RX ORDER — HYDROMORPHONE HYDROCHLORIDE 1 MG/ML
0.8 INJECTION, SOLUTION INTRAMUSCULAR; INTRAVENOUS; SUBCUTANEOUS EVERY 2 HOUR PRN
Status: DISCONTINUED | OUTPATIENT
Start: 2023-05-22 | End: 2023-05-24

## 2023-05-22 RX ORDER — PROCHLORPERAZINE EDISYLATE 5 MG/ML
5 INJECTION INTRAMUSCULAR; INTRAVENOUS EVERY 8 HOURS PRN
Status: DISCONTINUED | OUTPATIENT
Start: 2023-05-22 | End: 2023-05-26

## 2023-05-22 NOTE — ED INITIAL ASSESSMENT (HPI)
Pt to ed with c/o perla when laying down, pt states he hears crackling, frontal lobe headache x 2 days. PT took his BP at home' 229/131, pt took all meds tody.    Hx of HTN   PT admitted to hospital 2 weeks ago for pneumonia and HTN

## 2023-05-23 ENCOUNTER — APPOINTMENT (OUTPATIENT)
Dept: CV DIAGNOSTICS | Facility: HOSPITAL | Age: 43
End: 2023-05-23
Attending: INTERNAL MEDICINE
Payer: MEDICAID

## 2023-05-23 ENCOUNTER — APPOINTMENT (OUTPATIENT)
Dept: CV DIAGNOSTICS | Facility: HOSPITAL | Age: 43
DRG: 281 | End: 2023-05-23
Attending: INTERNAL MEDICINE
Payer: MEDICAID

## 2023-05-23 LAB
ANION GAP SERPL CALC-SCNC: 7 MMOL/L (ref 0–18)
ATRIAL RATE: 95 BPM
BUN BLD-MCNC: 50 MG/DL (ref 7–18)
CALCIUM BLD-MCNC: 8.8 MG/DL (ref 8.5–10.1)
CHLORIDE SERPL-SCNC: 112 MMOL/L (ref 98–112)
CO2 SERPL-SCNC: 21 MMOL/L (ref 21–32)
CREAT BLD-MCNC: 4.59 MG/DL
CREAT UR-SCNC: 125 MG/DL
DEPRECATED HBV CORE AB SER IA-ACNC: 447.5 NG/ML
GFR SERPLBLD BASED ON 1.73 SQ M-ARVRAT: 15 ML/MIN/1.73M2 (ref 60–?)
GLUCOSE BLD-MCNC: 121 MG/DL (ref 70–99)
GLUCOSE BLD-MCNC: 136 MG/DL (ref 70–99)
GLUCOSE BLD-MCNC: 137 MG/DL (ref 70–99)
GLUCOSE BLD-MCNC: 139 MG/DL (ref 70–99)
GLUCOSE BLD-MCNC: 152 MG/DL (ref 70–99)
IRON SATN MFR SERPL: 13 %
IRON SERPL-MCNC: 28 UG/DL
OSMOLALITY SERPL CALC.SUM OF ELEC: 305 MOSM/KG (ref 275–295)
P AXIS: 38 DEGREES
P-R INTERVAL: 190 MS
POTASSIUM SERPL-SCNC: 4 MMOL/L (ref 3.5–5.1)
Q-T INTERVAL: 390 MS
QRS DURATION: 88 MS
QTC CALCULATION (BEZET): 490 MS
R AXIS: 14 DEGREES
SODIUM SERPL-SCNC: 140 MMOL/L (ref 136–145)
SODIUM SERPL-SCNC: 24 MMOL/L
T AXIS: 125 DEGREES
TIBC SERPL-MCNC: 221 UG/DL (ref 240–450)
TRANSFERRIN SERPL-MCNC: 148 MG/DL (ref 200–360)
TROPONIN I HIGH SENSITIVITY: 1178 NG/L
UUN UR-MCNC: 552 MG/DL
VENTRICULAR RATE: 95 BPM

## 2023-05-23 PROCEDURE — 82962 GLUCOSE BLOOD TEST: CPT

## 2023-05-23 PROCEDURE — 82570 ASSAY OF URINE CREATININE: CPT | Performed by: INTERNAL MEDICINE

## 2023-05-23 PROCEDURE — 93308 TTE F-UP OR LMTD: CPT | Performed by: INTERNAL MEDICINE

## 2023-05-23 PROCEDURE — 84300 ASSAY OF URINE SODIUM: CPT | Performed by: INTERNAL MEDICINE

## 2023-05-23 PROCEDURE — 84484 ASSAY OF TROPONIN QUANT: CPT | Performed by: INTERNAL MEDICINE

## 2023-05-23 PROCEDURE — 84540 ASSAY OF URINE/UREA-N: CPT | Performed by: INTERNAL MEDICINE

## 2023-05-23 PROCEDURE — 83540 ASSAY OF IRON: CPT | Performed by: INTERNAL MEDICINE

## 2023-05-23 PROCEDURE — 82728 ASSAY OF FERRITIN: CPT | Performed by: INTERNAL MEDICINE

## 2023-05-23 PROCEDURE — 83550 IRON BINDING TEST: CPT | Performed by: INTERNAL MEDICINE

## 2023-05-23 PROCEDURE — 80048 BASIC METABOLIC PNL TOTAL CA: CPT | Performed by: HOSPITALIST

## 2023-05-23 RX ORDER — CLONIDINE HYDROCHLORIDE 0.1 MG/1
0.1 TABLET ORAL 2 TIMES DAILY
Status: DISCONTINUED | OUTPATIENT
Start: 2023-05-23 | End: 2023-05-26

## 2023-05-23 RX ORDER — NIFEDIPINE 30 MG/1
30 TABLET, FILM COATED, EXTENDED RELEASE ORAL NIGHTLY
COMMUNITY

## 2023-05-23 RX ORDER — HYDRALAZINE HYDROCHLORIDE 50 MG/1
100 TABLET, FILM COATED ORAL EVERY 8 HOURS SCHEDULED
Status: DISCONTINUED | OUTPATIENT
Start: 2023-05-23 | End: 2023-05-26

## 2023-05-23 RX ORDER — DEXTROAMPHETAMINE SACCHARATE, AMPHETAMINE ASPARTATE, DEXTROAMPHETAMINE SULFATE AND AMPHETAMINE SULFATE 2.5; 2.5; 2.5; 2.5 MG/1; MG/1; MG/1; MG/1
10 TABLET ORAL
Status: DISCONTINUED | OUTPATIENT
Start: 2023-05-23 | End: 2023-05-26

## 2023-05-23 RX ORDER — NIFEDIPINE 30 MG/1
30 TABLET, EXTENDED RELEASE ORAL NIGHTLY
Status: DISCONTINUED | OUTPATIENT
Start: 2023-05-23 | End: 2023-05-26

## 2023-05-23 RX ORDER — LAMOTRIGINE 150 MG/1
150 TABLET ORAL DAILY
Status: DISCONTINUED | OUTPATIENT
Start: 2023-05-23 | End: 2023-05-26

## 2023-05-23 RX ORDER — DEXTROSE MONOHYDRATE 25 G/50ML
50 INJECTION, SOLUTION INTRAVENOUS
Status: DISCONTINUED | OUTPATIENT
Start: 2023-05-23 | End: 2023-05-26

## 2023-05-23 RX ORDER — NICOTINE POLACRILEX 4 MG
30 LOZENGE BUCCAL
Status: DISCONTINUED | OUTPATIENT
Start: 2023-05-23 | End: 2023-05-26

## 2023-05-23 RX ORDER — TRAMADOL HYDROCHLORIDE 50 MG/1
50 TABLET ORAL EVERY 12 HOURS PRN
Status: DISCONTINUED | OUTPATIENT
Start: 2023-05-23 | End: 2023-05-26

## 2023-05-23 RX ORDER — CARVEDILOL 12.5 MG/1
25 TABLET ORAL 2 TIMES DAILY WITH MEALS
Status: DISCONTINUED | OUTPATIENT
Start: 2023-05-23 | End: 2023-05-26

## 2023-05-23 RX ORDER — NIFEDIPINE 60 MG/1
60 TABLET, EXTENDED RELEASE ORAL NIGHTLY
Status: DISCONTINUED | OUTPATIENT
Start: 2023-05-23 | End: 2023-05-23

## 2023-05-23 RX ORDER — FENOFIBRATE 134 MG/1
134 CAPSULE ORAL NIGHTLY
Status: DISCONTINUED | OUTPATIENT
Start: 2023-05-23 | End: 2023-05-26

## 2023-05-23 RX ORDER — FUROSEMIDE 10 MG/ML
60 INJECTION INTRAMUSCULAR; INTRAVENOUS ONCE
Status: COMPLETED | OUTPATIENT
Start: 2023-05-23 | End: 2023-05-23

## 2023-05-23 RX ORDER — FLUOXETINE HYDROCHLORIDE 20 MG/1
40 CAPSULE ORAL DAILY
Status: DISCONTINUED | OUTPATIENT
Start: 2023-05-23 | End: 2023-05-26

## 2023-05-23 RX ORDER — NICOTINE POLACRILEX 4 MG
15 LOZENGE BUCCAL
Status: DISCONTINUED | OUTPATIENT
Start: 2023-05-23 | End: 2023-05-26

## 2023-05-23 RX ORDER — FLUTICASONE PROPIONATE 50 MCG
1 SPRAY, SUSPENSION (ML) NASAL 2 TIMES DAILY PRN
Status: DISCONTINUED | OUTPATIENT
Start: 2023-05-23 | End: 2023-05-26

## 2023-05-23 RX ORDER — NIFEDIPINE 30 MG/1
60 TABLET, EXTENDED RELEASE ORAL DAILY
Status: DISCONTINUED | OUTPATIENT
Start: 2023-05-23 | End: 2023-05-26

## 2023-05-23 RX ORDER — ISOSORBIDE MONONITRATE 30 MG/1
60 TABLET, EXTENDED RELEASE ORAL DAILY
Status: DISCONTINUED | OUTPATIENT
Start: 2023-05-23 | End: 2023-05-26

## 2023-05-23 RX ORDER — DEXTROAMPHETAMINE SACCHARATE, AMPHETAMINE ASPARTATE, DEXTROAMPHETAMINE SULFATE AND AMPHETAMINE SULFATE 2.5; 2.5; 2.5; 2.5 MG/1; MG/1; MG/1; MG/1
10 TABLET ORAL
Status: DISCONTINUED | OUTPATIENT
Start: 2023-05-23 | End: 2023-05-23

## 2023-05-23 RX ORDER — SODIUM BICARBONATE 325 MG/1
650 TABLET ORAL 3 TIMES DAILY
Status: DISCONTINUED | OUTPATIENT
Start: 2023-05-23 | End: 2023-05-23

## 2023-05-23 RX ORDER — NIFEDIPINE 60 MG/1
60 TABLET, FILM COATED, EXTENDED RELEASE ORAL EVERY MORNING
COMMUNITY

## 2023-05-23 NOTE — PLAN OF CARE
Patient received to CNICU at 2317 from  ED. Patient was lethargic, uncomfortable, and nauseous; currently on nitro gtt. Paged hospitalist and cardiologist for orders. Restarted at home meds. cardene gtt hung and titrated to maintain SBP <160. At 230, patient had a bout of emesis and was complaining of pain so compazine and dilaudid given. Patient able to void this AM.     Problem: CARDIOVASCULAR - ADULT  Goal: Maintains optimal cardiac output and hemodynamic stability  Description: INTERVENTIONS:  - Monitor vital signs, rhythm, and trends  - Monitor for bleeding, hypotension and signs of decreased cardiac output  - Evaluate effectiveness of vasoactive medications to optimize hemodynamic stability  - Monitor arterial and/or venous puncture sites for bleeding and/or hematoma  - Assess quality of pulses, skin color and temperature  - Assess for signs of decreased coronary artery perfusion - ex.  Angina  - Evaluate fluid balance, assess for edema, trend weights  Outcome: Progressing  Goal: Absence of cardiac arrhythmias or at baseline  Description: INTERVENTIONS:  - Continuous cardiac monitoring, monitor vital signs, obtain 12 lead EKG if indicated  - Evaluate effectiveness of antiarrhythmic and heart rate control medications as ordered  - Initiate emergency measures for life threatening arrhythmias  - Monitor electrolytes and administer replacement therapy as ordered  Outcome: Progressing

## 2023-05-23 NOTE — ED QUICK NOTES
Orders for admission, patient is aware of plan and ready to go upstairs. Any questions, please call ED RN Jonathon Haney  at extension [phone]. Vaccinated?  Yes  Type of COVID test sent:rapid  COVID Suspicion level: Low    Titratable drug(s) infusing:  Rate: Nitroglycerin at 10mcg/min  Zithromax 500mg at 250/hr    LOC at time of transport:  Ox3  Other pertinent information: Uncontrolled HTN for weeks; here several times for it; today worsening fatigue;some SOB; no cough;room air 97% sat; c/o headache primarily and some chest heaviness and minimal relief with Nitroglycerin better with Dilaudid    CIWA score=  NIH score=

## 2023-05-23 NOTE — PLAN OF CARE
Received patient at 0730 awake and alert. Complaints of headache 8-9 on pain scale. Gave tylenol without relief. Restarted po bp medications, weaned off NTG and cardene drips. Gave dilaudid for headache with good relief. Problem: CARDIOVASCULAR - ADULT  Goal: Maintains optimal cardiac output and hemodynamic stability  Description: INTERVENTIONS:  - Monitor vital signs, rhythm, and trends  - Monitor for bleeding, hypotension and signs of decreased cardiac output  - Evaluate effectiveness of vasoactive medications to optimize hemodynamic stability  - Monitor arterial and/or venous puncture sites for bleeding and/or hematoma  - Assess quality of pulses, skin color and temperature  - Assess for signs of decreased coronary artery perfusion - ex.  Angina  - Evaluate fluid balance, assess for edema, trend weights  Outcome: Progressing  Goal: Absence of cardiac arrhythmias or at baseline  Description: INTERVENTIONS:  - Continuous cardiac monitoring, monitor vital signs, obtain 12 lead EKG if indicated  - Evaluate effectiveness of antiarrhythmic and heart rate control medications as ordered  - Initiate emergency measures for life threatening arrhythmias  - Monitor electrolytes and administer replacement therapy as ordered  Outcome: Progressing

## 2023-05-24 ENCOUNTER — APPOINTMENT (OUTPATIENT)
Dept: ULTRASOUND IMAGING | Facility: HOSPITAL | Age: 43
DRG: 281 | End: 2023-05-24
Attending: INTERNAL MEDICINE
Payer: MEDICAID

## 2023-05-24 ENCOUNTER — APPOINTMENT (OUTPATIENT)
Dept: ULTRASOUND IMAGING | Facility: HOSPITAL | Age: 43
End: 2023-05-24
Attending: INTERNAL MEDICINE
Payer: MEDICAID

## 2023-05-24 LAB
ALBUMIN SERPL-MCNC: 2.7 G/DL (ref 3.4–5)
ANION GAP SERPL CALC-SCNC: 7 MMOL/L (ref 0–18)
ATRIAL RATE: 88 BPM
BASOPHILS # BLD AUTO: 0.08 X10(3) UL (ref 0–0.2)
BASOPHILS NFR BLD AUTO: 1.1 %
BILIRUB UR QL STRIP.AUTO: NEGATIVE
BUN BLD-MCNC: 49 MG/DL (ref 7–18)
CALCIUM BLD-MCNC: 7.9 MG/DL (ref 8.5–10.1)
CHLORIDE SERPL-SCNC: 111 MMOL/L (ref 98–112)
CLARITY UR REFRACT.AUTO: CLEAR
CO2 SERPL-SCNC: 21 MMOL/L (ref 21–32)
COLOR UR AUTO: YELLOW
CREAT BLD-MCNC: 4.96 MG/DL
CREAT UR-SCNC: 142 MG/DL
CREAT UR-SCNC: 142 MG/DL
EOSINOPHIL # BLD AUTO: 0.54 X10(3) UL (ref 0–0.7)
EOSINOPHIL NFR BLD AUTO: 7.3 %
ERYTHROCYTE [DISTWIDTH] IN BLOOD BY AUTOMATED COUNT: 15 %
GFR SERPLBLD BASED ON 1.73 SQ M-ARVRAT: 14 ML/MIN/1.73M2 (ref 60–?)
GLUCOSE BLD-MCNC: 137 MG/DL (ref 70–99)
GLUCOSE BLD-MCNC: 167 MG/DL (ref 70–99)
GLUCOSE BLD-MCNC: 168 MG/DL (ref 70–99)
GLUCOSE BLD-MCNC: 185 MG/DL (ref 70–99)
GLUCOSE BLD-MCNC: 197 MG/DL (ref 70–99)
GLUCOSE UR STRIP.AUTO-MCNC: 50 MG/DL
HCT VFR BLD AUTO: 25 %
HGB BLD-MCNC: 8.3 G/DL
IMM GRANULOCYTES # BLD AUTO: 0.04 X10(3) UL (ref 0–1)
IMM GRANULOCYTES NFR BLD: 0.5 %
KETONES UR STRIP.AUTO-MCNC: NEGATIVE MG/DL
LEUKOCYTE ESTERASE UR QL STRIP.AUTO: NEGATIVE
LYMPHOCYTES # BLD AUTO: 1.76 X10(3) UL (ref 1–4)
LYMPHOCYTES NFR BLD AUTO: 23.8 %
MAGNESIUM SERPL-MCNC: 2 MG/DL (ref 1.6–2.6)
MCH RBC QN AUTO: 31 PG (ref 26–34)
MCHC RBC AUTO-ENTMCNC: 33.2 G/DL (ref 31–37)
MCV RBC AUTO: 93.3 FL
MICROALBUMIN UR-MCNC: 167 MG/DL
MICROALBUMIN/CREAT 24H UR-RTO: 1176.1 UG/MG (ref ?–30)
MONOCYTES # BLD AUTO: 0.77 X10(3) UL (ref 0.1–1)
MONOCYTES NFR BLD AUTO: 10.4 %
NEUTROPHILS # BLD AUTO: 4.2 X10 (3) UL (ref 1.5–7.7)
NEUTROPHILS # BLD AUTO: 4.2 X10(3) UL (ref 1.5–7.7)
NEUTROPHILS NFR BLD AUTO: 56.9 %
NITRITE UR QL STRIP.AUTO: NEGATIVE
OSMOLALITY SERPL CALC.SUM OF ELEC: 306 MOSM/KG (ref 275–295)
P AXIS: 22 DEGREES
P-R INTERVAL: 192 MS
PH UR STRIP.AUTO: 6 [PH] (ref 5–8)
PHOSPHATE SERPL-MCNC: 5.1 MG/DL (ref 2.5–4.9)
PLATELET # BLD AUTO: 257 10(3)UL (ref 150–450)
POTASSIUM SERPL-SCNC: 3.6 MMOL/L (ref 3.5–5.1)
PROT UR STRIP.AUTO-MCNC: 100 MG/DL
PROT UR-MCNC: 236 MG/DL
PROT/CREAT UR-RTO: 1.66
Q-T INTERVAL: 402 MS
QRS DURATION: 86 MS
QTC CALCULATION (BEZET): 486 MS
R AXIS: 19 DEGREES
RBC # BLD AUTO: 2.68 X10(6)UL
SODIUM SERPL-SCNC: 139 MMOL/L (ref 136–145)
SP GR UR STRIP.AUTO: 1.01 (ref 1–1.03)
T AXIS: 189 DEGREES
UROBILINOGEN UR STRIP.AUTO-MCNC: <2 MG/DL
VENTRICULAR RATE: 88 BPM
WBC # BLD AUTO: 7.4 X10(3) UL (ref 4–11)

## 2023-05-24 PROCEDURE — 84156 ASSAY OF PROTEIN URINE: CPT | Performed by: INTERNAL MEDICINE

## 2023-05-24 PROCEDURE — 93010 ELECTROCARDIOGRAM REPORT: CPT | Performed by: INTERNAL MEDICINE

## 2023-05-24 PROCEDURE — 93005 ELECTROCARDIOGRAM TRACING: CPT

## 2023-05-24 PROCEDURE — 82570 ASSAY OF URINE CREATININE: CPT | Performed by: INTERNAL MEDICINE

## 2023-05-24 PROCEDURE — 76775 US EXAM ABDO BACK WALL LIM: CPT | Performed by: INTERNAL MEDICINE

## 2023-05-24 PROCEDURE — 81001 URINALYSIS AUTO W/SCOPE: CPT | Performed by: INTERNAL MEDICINE

## 2023-05-24 PROCEDURE — 82043 UR ALBUMIN QUANTITATIVE: CPT | Performed by: INTERNAL MEDICINE

## 2023-05-24 PROCEDURE — 82962 GLUCOSE BLOOD TEST: CPT

## 2023-05-24 PROCEDURE — 83735 ASSAY OF MAGNESIUM: CPT | Performed by: INTERNAL MEDICINE

## 2023-05-24 PROCEDURE — 85025 COMPLETE CBC W/AUTO DIFF WBC: CPT | Performed by: INTERNAL MEDICINE

## 2023-05-24 PROCEDURE — 80069 RENAL FUNCTION PANEL: CPT | Performed by: INTERNAL MEDICINE

## 2023-05-24 RX ORDER — BUTALBITAL, ACETAMINOPHEN AND CAFFEINE 50; 325; 40 MG/1; MG/1; MG/1
1 TABLET ORAL EVERY 6 HOURS PRN
Status: DISCONTINUED | OUTPATIENT
Start: 2023-05-24 | End: 2023-05-26

## 2023-05-24 NOTE — PLAN OF CARE
Assumed care of Godwin @ approximately 1920: awake, alert, oriented, pleasant, and cooperative with care; on room air; NSR on the monitor, normotensive; 8/10 left-sided headache pain controlled with hydromorphone IVP PRN per MAR x 2 doses this shift      Please see flow-sheets for full assessments and/or additional information        Problem: CARDIOVASCULAR - ADULT  Goal: Maintains optimal cardiac output and hemodynamic stability  Description: INTERVENTIONS:  - Monitor vital signs, rhythm, and trends  - Monitor for bleeding, hypotension and signs of decreased cardiac output  - Evaluate effectiveness of vasoactive medications to optimize hemodynamic stability  - Monitor arterial and/or venous puncture sites for bleeding and/or hematoma  - Assess quality of pulses, skin color and temperature  - Assess for signs of decreased coronary artery perfusion - ex.  Angina  - Evaluate fluid balance, assess for edema, trend weights  Outcome: Progressing  Goal: Absence of cardiac arrhythmias or at baseline  Description: INTERVENTIONS:  - Continuous cardiac monitoring, monitor vital signs, obtain 12 lead EKG if indicated  - Evaluate effectiveness of antiarrhythmic and heart rate control medications as ordered  - Initiate emergency measures for life threatening arrhythmias  - Monitor electrolytes and administer replacement therapy as ordered  Outcome: Progressing     Problem: PAIN - ADULT  Goal: Verbalizes/displays adequate comfort level or patient's stated pain goal  Description: INTERVENTIONS:  - Encourage pt to monitor pain and request assistance  - Assess pain using appropriate pain scale  - Administer analgesics based on type and severity of pain and evaluate response  - Implement non-pharmacological measures as appropriate and evaluate response  - Consider cultural and social influences on pain and pain management  - Manage/alleviate anxiety  - Utilize distraction and/or relaxation techniques  - Monitor for opioid side effects  - Notify MD/LIP if interventions unsuccessful or patient reports new pain  - Anticipate increased pain with activity and pre-medicate as appropriate  Outcome: Progressing     Problem: METABOLIC/FLUID AND ELECTROLYTES - ADULT  Goal: Glucose maintained within prescribed range  Description: INTERVENTIONS:  - Monitor Blood Glucose as ordered  - Assess for signs and symptoms of hyperglycemia and hypoglycemia  - Administer ordered medications to maintain glucose within target range  - Assess barriers to adequate nutritional intake and initiate nutrition consult as needed  - Instruct patient on self management of diabetes  Outcome: Progressing  Goal: Electrolytes maintained within normal limits  Description: INTERVENTIONS:  - Monitor labs and rhythm and assess patient for signs and symptoms of electrolyte imbalances  - Administer electrolyte replacement as ordered  - Monitor response to electrolyte replacements, including rhythm and repeat lab results as appropriate  - Fluid restriction as ordered  - Instruct patient on fluid and nutrition restrictions as appropriate  Outcome: Progressing

## 2023-05-24 NOTE — PHYSICAL THERAPY NOTE
Physical therapy consult requested. Pt is well known to therapy service from multiple previous admissions. Pt reports that when he was admitted he was off balance, but his deficits have resolved and he has been mobilizing within room independently. Pt declines inpatient thearpy services. Pt reports that he feels safe for discharge home, and he has assistance from his mother, who lives with him. Pt has historically been his mother's caregiver, but admits that as his kidney health has declined, he has required increased assistance from his mother. Pt also reports that he has been receiving home PT/OT services, which he would like to continue upon discharge. Patient discharged from inpatient physical therapy services at this time. Please reconsult if there is a change in status that would necessitate PT services.

## 2023-05-24 NOTE — OCCUPATIONAL THERAPY NOTE
Screened pt for skilled OT needs as he is known to this dept based on previous admissions. Pt reports being at his independent baseline. Pt reports that since last night, he has been going to the bathroom on his own. Pt feels that he has sufficient support at home. Pt denies strength or balance deficits. Will DC OT at this time, please re-order should a new functional limitation arise.      Thank you for allowing me to care for this patient,   Siria Zuñiga, OTR/L   Occupational Therapist   [facility]   Pager: [phone]

## 2023-05-24 NOTE — PLAN OF CARE
Resting in bed, remains off all gtts. EKG done. Fioricet for headaches. Kidney US done at bedside. PVR 0. Transfer orders received.

## 2023-05-25 LAB
ALBUMIN SERPL-MCNC: 2.9 G/DL (ref 3.4–5)
ANION GAP SERPL CALC-SCNC: 6 MMOL/L (ref 0–18)
BILIRUB UR QL STRIP.AUTO: NEGATIVE
BUN BLD-MCNC: 47 MG/DL (ref 7–18)
CALCIUM BLD-MCNC: 8.3 MG/DL (ref 8.5–10.1)
CHLORIDE SERPL-SCNC: 111 MMOL/L (ref 98–112)
CLARITY UR REFRACT.AUTO: CLEAR
CO2 SERPL-SCNC: 24 MMOL/L (ref 21–32)
COLOR UR AUTO: YELLOW
CREAT BLD-MCNC: 4.61 MG/DL
ERYTHROCYTE [DISTWIDTH] IN BLOOD BY AUTOMATED COUNT: 14.9 %
GFR SERPLBLD BASED ON 1.73 SQ M-ARVRAT: 15 ML/MIN/1.73M2 (ref 60–?)
GLUCOSE BLD-MCNC: 110 MG/DL (ref 70–99)
GLUCOSE BLD-MCNC: 129 MG/DL (ref 70–99)
GLUCOSE BLD-MCNC: 133 MG/DL (ref 70–99)
GLUCOSE BLD-MCNC: 140 MG/DL (ref 70–99)
GLUCOSE BLD-MCNC: 202 MG/DL (ref 70–99)
GLUCOSE UR STRIP.AUTO-MCNC: 50 MG/DL
HCT VFR BLD AUTO: 26.6 %
HGB BLD-MCNC: 8.8 G/DL
KETONES UR STRIP.AUTO-MCNC: NEGATIVE MG/DL
LEUKOCYTE ESTERASE UR QL STRIP.AUTO: NEGATIVE
MAGNESIUM SERPL-MCNC: 2.1 MG/DL (ref 1.6–2.6)
MCH RBC QN AUTO: 30.9 PG (ref 26–34)
MCHC RBC AUTO-ENTMCNC: 33.1 G/DL (ref 31–37)
MCV RBC AUTO: 93.3 FL
NITRITE UR QL STRIP.AUTO: NEGATIVE
OSMOLALITY SERPL CALC.SUM OF ELEC: 305 MOSM/KG (ref 275–295)
PH UR STRIP.AUTO: 6 [PH] (ref 5–8)
PHOSPHATE SERPL-MCNC: 3.7 MG/DL (ref 2.5–4.9)
PLATELET # BLD AUTO: 255 10(3)UL (ref 150–450)
POTASSIUM SERPL-SCNC: 3.5 MMOL/L (ref 3.5–5.1)
PROT UR STRIP.AUTO-MCNC: 100 MG/DL
RBC # BLD AUTO: 2.85 X10(6)UL
SODIUM SERPL-SCNC: 141 MMOL/L (ref 136–145)
SP GR UR STRIP.AUTO: 1.02 (ref 1–1.03)
UROBILINOGEN UR STRIP.AUTO-MCNC: <2 MG/DL
WBC # BLD AUTO: 8.2 X10(3) UL (ref 4–11)

## 2023-05-25 PROCEDURE — 85027 COMPLETE CBC AUTOMATED: CPT | Performed by: INTERNAL MEDICINE

## 2023-05-25 PROCEDURE — 80069 RENAL FUNCTION PANEL: CPT | Performed by: INTERNAL MEDICINE

## 2023-05-25 PROCEDURE — 82962 GLUCOSE BLOOD TEST: CPT

## 2023-05-25 PROCEDURE — 83735 ASSAY OF MAGNESIUM: CPT | Performed by: INTERNAL MEDICINE

## 2023-05-25 PROCEDURE — 81001 URINALYSIS AUTO W/SCOPE: CPT | Performed by: INTERNAL MEDICINE

## 2023-05-25 RX ORDER — HYDRALAZINE HYDROCHLORIDE 25 MG/1
100 TABLET, FILM COATED ORAL 3 TIMES DAILY PRN
Qty: 30 TABLET | Refills: 0 | Status: SHIPPED | COMMUNITY
Start: 2023-05-25

## 2023-05-25 NOTE — PROGRESS NOTES
Pulm/CC    - chart reviewed. No further pulm/cc issues. Pt stable for floor. Will follow peripherally. Please call with questions.   Ruth Camp MD

## 2023-05-25 NOTE — PROGRESS NOTES
Pt. Transferred from CNICU this afternoon to CTU 8    Pt. Alert and oriented times 4. NSR on tele. On RA. Up ad osvaldo. Denies pain. Complaining of some shortness of breath. Doctor notified. Said this may be baseline. Cardiology signed off and completed med req. Nephrology will follow. Pt. Updated on plan of care. Call light within reach.

## 2023-05-25 NOTE — PLAN OF CARE
Care assumed at approx 0730. Pt A&Ox4, VSS. NSR on tele. Saturating well on RA, supp O2 when sleeping. Up to BR, bathed, brushed teeth. Up in chair all morning. Cousin visiting at bedside. Bed available for transfer to [facility], room 8606. Report given to receiving JASON Santiago. All questions addressed.

## 2023-05-25 NOTE — PLAN OF CARE
Assumed care of Godwin @ approximately 1920: awake, alert, oriented, pleasant, and cooperative with care; on room air; NSR on the monitor, normotensive; left-sided headache pain controlled with Fioricet and tramadol for right shoulder pain. CTU orders in [facility] waiting for bed assignment        Please see flow-sheets for full assessments and/or additional information      Problem: CARDIOVASCULAR - ADULT  Goal: Maintains optimal cardiac output and hemodynamic stability  Description: INTERVENTIONS:  - Monitor vital signs, rhythm, and trends  - Monitor for bleeding, hypotension and signs of decreased cardiac output  - Evaluate effectiveness of vasoactive medications to optimize hemodynamic stability  - Monitor arterial and/or venous puncture sites for bleeding and/or hematoma  - Assess quality of pulses, skin color and temperature  - Assess for signs of decreased coronary artery perfusion - ex.  Angina  - Evaluate fluid balance, assess for edema, trend weights  Outcome: Progressing  Goal: Absence of cardiac arrhythmias or at baseline  Description: INTERVENTIONS:  - Continuous cardiac monitoring, monitor vital signs, obtain 12 lead EKG if indicated  - Evaluate effectiveness of antiarrhythmic and heart rate control medications as ordered  - Initiate emergency measures for life threatening arrhythmias  - Monitor electrolytes and administer replacement therapy as ordered  Outcome: Progressing     Problem: PAIN - ADULT  Goal: Verbalizes/displays adequate comfort level or patient's stated pain goal  Description: INTERVENTIONS:  - Encourage pt to monitor pain and request assistance  - Assess pain using appropriate pain scale  - Administer analgesics based on type and severity of pain and evaluate response  - Implement non-pharmacological measures as appropriate and evaluate response  - Consider cultural and social influences on pain and pain management  - Manage/alleviate anxiety  - Utilize distraction and/or relaxation techniques  - Monitor for opioid side effects  - Notify MD/LIP if interventions unsuccessful or patient reports new pain  - Anticipate increased pain with activity and pre-medicate as appropriate  Outcome: Progressing     Problem: METABOLIC/FLUID AND ELECTROLYTES - ADULT  Goal: Glucose maintained within prescribed range  Description: INTERVENTIONS:  - Monitor Blood Glucose as ordered  - Assess for signs and symptoms of hyperglycemia and hypoglycemia  - Administer ordered medications to maintain glucose within target range  - Assess barriers to adequate nutritional intake and initiate nutrition consult as needed  - Instruct patient on self management of diabetes  Outcome: Progressing  Goal: Electrolytes maintained within normal limits  Description: INTERVENTIONS:  - Monitor labs and rhythm and assess patient for signs and symptoms of electrolyte imbalances  - Administer electrolyte replacement as ordered  - Monitor response to electrolyte replacements, including rhythm and repeat lab results as appropriate  - Fluid restriction as ordered  - Instruct patient on fluid and nutrition restrictions as appropriate  Outcome: Progressing

## 2023-05-26 VITALS
BODY MASS INDEX: 34.21 KG/M2 | TEMPERATURE: 98 F | RESPIRATION RATE: 15 BRPM | OXYGEN SATURATION: 96 % | HEIGHT: 74 IN | HEART RATE: 86 BPM | DIASTOLIC BLOOD PRESSURE: 69 MMHG | SYSTOLIC BLOOD PRESSURE: 114 MMHG | WEIGHT: 266.56 LBS

## 2023-05-26 LAB
ALBUMIN SERPL-MCNC: 2.9 G/DL (ref 3.4–5)
ALBUMIN SERPL-MCNC: 3.1 G/DL (ref 3.4–5)
ANION GAP SERPL CALC-SCNC: 4 MMOL/L (ref 0–18)
ANION GAP SERPL CALC-SCNC: 7 MMOL/L (ref 0–18)
BUN BLD-MCNC: 45 MG/DL (ref 7–18)
BUN BLD-MCNC: 46 MG/DL (ref 7–18)
CALCIUM BLD-MCNC: 8.3 MG/DL (ref 8.5–10.1)
CALCIUM BLD-MCNC: 8.6 MG/DL (ref 8.5–10.1)
CHLORIDE SERPL-SCNC: 112 MMOL/L (ref 98–112)
CHLORIDE SERPL-SCNC: 113 MMOL/L (ref 98–112)
CO2 SERPL-SCNC: 21 MMOL/L (ref 21–32)
CO2 SERPL-SCNC: 21 MMOL/L (ref 21–32)
CREAT BLD-MCNC: 4.1 MG/DL
CREAT BLD-MCNC: 4.29 MG/DL
GFR SERPLBLD BASED ON 1.73 SQ M-ARVRAT: 16 ML/MIN/1.73M2 (ref 60–?)
GFR SERPLBLD BASED ON 1.73 SQ M-ARVRAT: 17 ML/MIN/1.73M2 (ref 60–?)
GLUCOSE BLD-MCNC: 119 MG/DL (ref 70–99)
GLUCOSE BLD-MCNC: 121 MG/DL (ref 70–99)
GLUCOSE BLD-MCNC: 182 MG/DL (ref 70–99)
MAGNESIUM SERPL-MCNC: 2 MG/DL (ref 1.6–2.6)
OSMOLALITY SERPL CALC.SUM OF ELEC: 299 MOSM/KG (ref 275–295)
OSMOLALITY SERPL CALC.SUM OF ELEC: 306 MOSM/KG (ref 275–295)
PHOSPHATE SERPL-MCNC: 3.3 MG/DL (ref 2.5–4.9)
PHOSPHATE SERPL-MCNC: 3.8 MG/DL (ref 2.5–4.9)
POTASSIUM SERPL-SCNC: 3.6 MMOL/L (ref 3.5–5.1)
POTASSIUM SERPL-SCNC: 3.6 MMOL/L (ref 3.5–5.1)
SODIUM SERPL-SCNC: 138 MMOL/L (ref 136–145)
SODIUM SERPL-SCNC: 140 MMOL/L (ref 136–145)

## 2023-05-26 PROCEDURE — 94640 AIRWAY INHALATION TREATMENT: CPT

## 2023-05-26 PROCEDURE — 82962 GLUCOSE BLOOD TEST: CPT

## 2023-05-26 PROCEDURE — 93010 ELECTROCARDIOGRAM REPORT: CPT | Performed by: INTERNAL MEDICINE

## 2023-05-26 PROCEDURE — 93005 ELECTROCARDIOGRAM TRACING: CPT

## 2023-05-26 PROCEDURE — 83735 ASSAY OF MAGNESIUM: CPT | Performed by: INTERNAL MEDICINE

## 2023-05-26 PROCEDURE — 80069 RENAL FUNCTION PANEL: CPT | Performed by: INTERNAL MEDICINE

## 2023-05-26 PROCEDURE — 94799 UNLISTED PULMONARY SVC/PX: CPT

## 2023-05-26 RX ORDER — NIFEDIPINE 30 MG/1
30 TABLET, FILM COATED, EXTENDED RELEASE ORAL NIGHTLY
Qty: 30 TABLET | Refills: 1 | Status: SHIPPED | OUTPATIENT
Start: 2023-05-26 | End: 2023-05-26

## 2023-05-26 RX ORDER — NIFEDIPINE 60 MG/1
60 TABLET, FILM COATED, EXTENDED RELEASE ORAL DAILY
Qty: 30 TABLET | Refills: 1 | Status: SHIPPED | OUTPATIENT
Start: 2023-05-27 | End: 2023-05-26

## 2023-05-26 RX ORDER — ALBUTEROL SULFATE 2.5 MG/3ML
2.5 SOLUTION RESPIRATORY (INHALATION) ONCE
Status: COMPLETED | OUTPATIENT
Start: 2023-05-26 | End: 2023-05-26

## 2023-05-26 RX ORDER — ARIPIPRAZOLE 5 MG/1
15 TABLET ORAL DAILY
Qty: 42 TABLET | Refills: 0 | Status: SHIPPED | OUTPATIENT
Start: 2023-05-26 | End: 2023-06-09

## 2023-05-26 NOTE — PLAN OF CARE
Patient alert and oriented x4. Up independently as tolerated. NSR/ST on tele. Maintaining o2 sats on RA. Denies pain. C/o SOB, 1x neb ordered overnight with relief. Updated patient on POC, verbalized understanding. Plan is for discharge today. Problem: CARDIOVASCULAR - ADULT  Goal: Maintains optimal cardiac output and hemodynamic stability  Description: INTERVENTIONS:  - Monitor vital signs, rhythm, and trends  - Monitor for bleeding, hypotension and signs of decreased cardiac output  - Evaluate effectiveness of vasoactive medications to optimize hemodynamic stability  - Monitor arterial and/or venous puncture sites for bleeding and/or hematoma  - Assess quality of pulses, skin color and temperature  - Assess for signs of decreased coronary artery perfusion - ex.  Angina  - Evaluate fluid balance, assess for edema, trend weights  Outcome: Progressing  Goal: Absence of cardiac arrhythmias or at baseline  Description: INTERVENTIONS:  - Continuous cardiac monitoring, monitor vital signs, obtain 12 lead EKG if indicated  - Evaluate effectiveness of antiarrhythmic and heart rate control medications as ordered  - Initiate emergency measures for life threatening arrhythmias  - Monitor electrolytes and administer replacement therapy as ordered  Outcome: Progressing     Problem: Patient/Family Goals  Goal: Patient/Family Long Term Goal  Description: Patient's Long Term Goal:     Interventins:  -   - See additional Care Plan goals for specific interventions  Outcome: Progressing  Goal: Patient/Family Short Term Go  Description: Patient's Short Term Goal:     Interventions:   -   - See additional Care Plan goals for specific interventions  Outcome: Progressing     Problem: PAIN - ADULT  Goal: Verbalizes/displays adequate comfort level or patient's stated pain goal  Description: INTERVENTIONS:  - Encourage pt to monitor pain and request assistance  - Assess pain using appropriate pain scale  - Administer analgesics based on type and severity of pain and evaluate response  - Implement non-pharmacological measures as appropriate and evaluate response  - Consider cultural and social influences on pain and pain management  - Manage/alleviate anxiety  - Utilize distraction and/or relaxation techniques  - Monitor for opioid side effects  - Notify MD/LIP if interventions unsuccessful or patient reports new pain  - Anticipate increased pain with activity and pre-medicate as appropriate  Outcome: Progressing     Problem: METABOLIC/FLUID AND ELECTROLYTES - ADULT  Goal: Glucose maintained within prescribed range  Description: INTERVENTIONS:  - Monitor Blood Glucose as ordered  - Assess for signs and symptoms of hyperglycemia and hypoglycemia  - Administer ordered medications to maintain glucose within target range  - Assess barriers to adequate nutritional intake and initiate nutrition consult as needed  - Instruct patient on self management of diabetes  Outcome: Progressing  Goal: Electrolytes maintained within normal limits  Description: INTERVENTIONS:  - Monitor labs and rhythm and assess patient for signs and symptoms of electrolyte imbalances  - Administer electrolyte replacement as ordered  - Monitor response to electrolyte replacements, including rhythm and repeat lab results as appropriate  - Fluid restriction as ordered  - Instruct patient on fluid and nutrition restrictions as appropriate  Outcome: Progressing

## 2023-05-26 NOTE — PLAN OF CARE
Assumed care of pt at 0730. A/ox4, rm air, SR on tele. No reports of pain. Breath sounds diminished upon auscultation. Reports productive cough w/ small amount of thin sputum. Denies shortness of breath. Discussed POC w/ pt. Problem: CARDIOVASCULAR - ADULT  Goal: Maintains optimal cardiac output and hemodynamic stability  Description: INTERVENTIONS:  - Monitor vital signs, rhythm, and trends  - Monitor for bleeding, hypotension and signs of decreased cardiac output  - Evaluate effectiveness of vasoactive medications to optimize hemodynamic stability  - Monitor arterial and/or venous puncture sites for bleeding and/or hematoma  - Assess quality of pulses, skin color and temperature  - Assess for signs of decreased coronary artery perfusion - ex.  Angina  - Evaluate fluid balance, assess for edema, trend weights  Outcome: Progressing  Goal: Absence of cardiac arrhythmias or at baseline  Description: INTERVENTIONS:  - Continuous cardiac monitoring, monitor vital signs, obtain 12 lead EKG if indicated  - Evaluate effectiveness of antiarrhythmic and heart rate control medications as ordered  - Initiate emergency measures for life threatening arrhythmias  - Monitor electrolytes and administer replacement therapy as ordered  Outcome: Progressing     Problem: Patient/Family Goals  Goal: Patient/Family Long Term Goal  Description: Patient's Long Term Goal: to go home    Interventions:  - MD rounding, medication management, monitor labs, monitor BP  - See additional Care Plan goals for specific interventions  Outcome: Progressing  Goal: Patient/Family Short Term Goal  Description: Patient's Short Term Goal: remain pain free    Interventions:   - pain assessments q4, pain meds PRN  - See additional Care Plan goals for specific interventions  Outcome: Progressing     Problem: PAIN - ADULT  Goal: Verbalizes/displays adequate comfort level or patient's stated pain goal  Description: INTERVENTIONS:  - Encourage pt to monitor pain and request assistance  - Assess pain using appropriate pain scale  - Administer analgesics based on type and severity of pain and evaluate response  - Implement non-pharmacological measures as appropriate and evaluate response  - Consider cultural and social influences on pain and pain management  - Manage/alleviate anxiety  - Utilize distraction and/or relaxation techniques  - Monitor for opioid side effects  - Notify MD/LIP if interventions unsuccessful or patient reports new pain  - Anticipate increased pain with activity and pre-medicate as appropriate  Outcome: Progressing     Problem: METABOLIC/FLUID AND ELECTROLYTES - ADULT  Goal: Glucose maintained within prescribed range  Description: INTERVENTIONS:  - Monitor Blood Glucose as ordered  - Assess for signs and symptoms of hyperglycemia and hypoglycemia  - Administer ordered medications to maintain glucose within target range  - Assess barriers to adequate nutritional intake and initiate nutrition consult as needed  - Instruct patient on self management of diabetes  Outcome: Progressing  Goal: Electrolytes maintained within normal limits  Description: INTERVENTIONS:  - Monitor labs and rhythm and assess patient for signs and symptoms of electrolyte imbalances  - Administer electrolyte replacement as ordered  - Monitor response to electrolyte replacements, including rhythm and repeat lab results as appropriate  - Fluid restriction as ordered  - Instruct patient on fluid and nutrition restrictions as appropriate  Outcome: Progressing

## 2023-05-27 LAB
ATRIAL RATE: 86 BPM
P AXIS: 25 DEGREES
P-R INTERVAL: 186 MS
Q-T INTERVAL: 402 MS
QRS DURATION: 98 MS
QTC CALCULATION (BEZET): 481 MS
R AXIS: 25 DEGREES
T AXIS: 162 DEGREES
VENTRICULAR RATE: 86 BPM

## 2023-06-01 NOTE — PAYOR COMM NOTE
Discharge Notification    Patient Name: Carlos Gamino  Payor: Suman Medellin #: [id number]  Authorization Number: [id number]  Admit Date/Time: 5/22/2023 6:10 PM  Discharge Date/Time: 5/26/2023 5:20 PM

## 2023-07-26 ENCOUNTER — APPOINTMENT (OUTPATIENT)
Dept: CT IMAGING | Age: 43
End: 2023-07-26
Attending: EMERGENCY MEDICINE
Payer: MEDICAID

## 2023-07-26 ENCOUNTER — HOSPITAL ENCOUNTER (INPATIENT)
Facility: HOSPITAL | Age: 43
LOS: 1 days | Discharge: HOME | End: 2023-07-28
Attending: EMERGENCY MEDICINE | Admitting: INTERNAL MEDICINE
Payer: MEDICAID

## 2023-07-26 ENCOUNTER — HOSPITAL ENCOUNTER (OUTPATIENT)
Facility: HOSPITAL | Age: 43
Setting detail: OBSERVATION
Discharge: HOME | End: 2023-07-28
Attending: EMERGENCY MEDICINE | Admitting: INTERNAL MEDICINE
Payer: MEDICAID

## 2023-07-26 DIAGNOSIS — R51.9 ACUTE INTRACTABLE HEADACHE, UNSPECIFIED HEADACHE TYPE: Primary | ICD-10-CM

## 2023-07-26 DIAGNOSIS — Z99.2 ESRD (END STAGE RENAL DISEASE) ON DIALYSIS (HCC): ICD-10-CM

## 2023-07-26 DIAGNOSIS — I10 PRIMARY HYPERTENSION: ICD-10-CM

## 2023-07-26 DIAGNOSIS — R19.7 DIARRHEA, UNSPECIFIED TYPE: ICD-10-CM

## 2023-07-26 DIAGNOSIS — N18.6 ESRD (END STAGE RENAL DISEASE) ON DIALYSIS (HCC): ICD-10-CM

## 2023-07-26 LAB
ALBUMIN SERPL-MCNC: 3.4 G/DL (ref 3.4–5)
ALBUMIN/GLOB SERPL: 0.8 {RATIO} (ref 1–2)
ALP LIVER SERPL-CCNC: 122 U/L
ALT SERPL-CCNC: 29 U/L
ANION GAP SERPL CALC-SCNC: 9 MMOL/L (ref 0–18)
AST SERPL-CCNC: 37 U/L (ref 15–37)
BASOPHILS # BLD AUTO: 0.08 X10(3) UL (ref 0–0.2)
BASOPHILS NFR BLD AUTO: 1.2 %
BILIRUB SERPL-MCNC: 0.4 MG/DL (ref 0.1–2)
BUN BLD-MCNC: 56 MG/DL (ref 7–18)
CALCIUM BLD-MCNC: 8 MG/DL (ref 8.5–10.1)
CHLORIDE SERPL-SCNC: 111 MMOL/L (ref 98–112)
CO2 SERPL-SCNC: 19 MMOL/L (ref 21–32)
CREAT BLD-MCNC: 8.09 MG/DL
EGFRCR SERPLBLD CKD-EPI 2021: 8 ML/MIN/1.73M2 (ref 60–?)
EOSINOPHIL # BLD AUTO: 0.35 X10(3) UL (ref 0–0.7)
EOSINOPHIL NFR BLD AUTO: 5.5 %
ERYTHROCYTE [DISTWIDTH] IN BLOOD BY AUTOMATED COUNT: 13 %
GLOBULIN PLAS-MCNC: 4.1 G/DL (ref 2.8–4.4)
GLUCOSE BLD-MCNC: 125 MG/DL (ref 70–99)
GLUCOSE BLD-MCNC: 133 MG/DL (ref 70–99)
HCT VFR BLD AUTO: 35.2 %
HGB BLD-MCNC: 12.2 G/DL
IMM GRANULOCYTES # BLD AUTO: 0.02 X10(3) UL (ref 0–1)
IMM GRANULOCYTES NFR BLD: 0.3 %
LYMPHOCYTES # BLD AUTO: 1.48 X10(3) UL (ref 1–4)
LYMPHOCYTES NFR BLD AUTO: 23.1 %
MCH RBC QN AUTO: 30.5 PG (ref 26–34)
MCHC RBC AUTO-ENTMCNC: 34.7 G/DL (ref 31–37)
MCV RBC AUTO: 88 FL
MONOCYTES # BLD AUTO: 0.67 X10(3) UL (ref 0.1–1)
MONOCYTES NFR BLD AUTO: 10.4 %
NEUTROPHILS # BLD AUTO: 3.82 X10 (3) UL (ref 1.5–7.7)
NEUTROPHILS # BLD AUTO: 3.82 X10(3) UL (ref 1.5–7.7)
NEUTROPHILS NFR BLD AUTO: 59.5 %
OSMOLALITY SERPL CALC.SUM OF ELEC: 305 MOSM/KG (ref 275–295)
PLATELET # BLD AUTO: 233 10(3)UL (ref 150–450)
POTASSIUM SERPL-SCNC: 3.7 MMOL/L (ref 3.5–5.1)
PROT SERPL-MCNC: 7.5 G/DL (ref 6.4–8.2)
RBC # BLD AUTO: 4 X10(6)UL
SODIUM SERPL-SCNC: 139 MMOL/L (ref 136–145)
WBC # BLD AUTO: 6.4 X10(3) UL (ref 4–11)

## 2023-07-26 PROCEDURE — 93010 ELECTROCARDIOGRAM REPORT: CPT

## 2023-07-26 PROCEDURE — 96375 TX/PRO/DX INJ NEW DRUG ADDON: CPT

## 2023-07-26 PROCEDURE — 93005 ELECTROCARDIOGRAM TRACING: CPT

## 2023-07-26 PROCEDURE — 80053 COMPREHEN METABOLIC PANEL: CPT | Performed by: EMERGENCY MEDICINE

## 2023-07-26 PROCEDURE — 85025 COMPLETE CBC W/AUTO DIFF WBC: CPT | Performed by: EMERGENCY MEDICINE

## 2023-07-26 PROCEDURE — 99285 EMERGENCY DEPT VISIT HI MDM: CPT

## 2023-07-26 PROCEDURE — 96376 TX/PRO/DX INJ SAME DRUG ADON: CPT

## 2023-07-26 PROCEDURE — 74176 CT ABD & PELVIS W/O CONTRAST: CPT | Performed by: EMERGENCY MEDICINE

## 2023-07-26 PROCEDURE — 82962 GLUCOSE BLOOD TEST: CPT

## 2023-07-26 PROCEDURE — 70450 CT HEAD/BRAIN W/O DYE: CPT | Performed by: EMERGENCY MEDICINE

## 2023-07-26 PROCEDURE — 96374 THER/PROPH/DIAG INJ IV PUSH: CPT

## 2023-07-26 RX ORDER — HYDROMORPHONE HYDROCHLORIDE 1 MG/ML
1 INJECTION, SOLUTION INTRAMUSCULAR; INTRAVENOUS; SUBCUTANEOUS ONCE
Status: COMPLETED | OUTPATIENT
Start: 2023-07-26 | End: 2023-07-26

## 2023-07-26 RX ORDER — KETOROLAC TROMETHAMINE 15 MG/ML
15 INJECTION, SOLUTION INTRAMUSCULAR; INTRAVENOUS ONCE
Status: COMPLETED | OUTPATIENT
Start: 2023-07-26 | End: 2023-07-26

## 2023-07-26 RX ORDER — METOCLOPRAMIDE HYDROCHLORIDE 5 MG/ML
5 INJECTION INTRAMUSCULAR; INTRAVENOUS ONCE
Status: COMPLETED | OUTPATIENT
Start: 2023-07-26 | End: 2023-07-26

## 2023-07-26 RX ORDER — CLONIDINE HYDROCHLORIDE 0.1 MG/1
0.2 TABLET ORAL ONCE
Status: COMPLETED | OUTPATIENT
Start: 2023-07-26 | End: 2023-07-26

## 2023-07-26 RX ORDER — DIPHENHYDRAMINE HYDROCHLORIDE 50 MG/ML
25 INJECTION INTRAMUSCULAR; INTRAVENOUS ONCE
Status: COMPLETED | OUTPATIENT
Start: 2023-07-26 | End: 2023-07-26

## 2023-07-26 RX ORDER — ONDANSETRON 2 MG/ML
4 INJECTION INTRAMUSCULAR; INTRAVENOUS ONCE
Status: COMPLETED | OUTPATIENT
Start: 2023-07-26 | End: 2023-07-26

## 2023-07-26 NOTE — ED INITIAL ASSESSMENT (HPI)
Here for shortness of breath, states he missed dialysis for 5 days due to diarrhea and not feeling well
Diane English

## 2023-07-27 PROBLEM — R19.7 DIARRHEA, UNSPECIFIED TYPE: Status: ACTIVE | Noted: 2023-07-27

## 2023-07-27 PROBLEM — Z99.2 ESRD (END STAGE RENAL DISEASE) ON DIALYSIS (HCC): Status: ACTIVE | Noted: 2023-07-27

## 2023-07-27 PROBLEM — I10 PRIMARY HYPERTENSION: Status: ACTIVE | Noted: 2023-07-27

## 2023-07-27 PROBLEM — N18.6 ESRD (END STAGE RENAL DISEASE) ON DIALYSIS (HCC): Status: ACTIVE | Noted: 2023-07-27

## 2023-07-27 LAB
ANION GAP SERPL CALC-SCNC: 7 MMOL/L (ref 0–18)
ATRIAL RATE: 88 BPM
BASOPHILS # BLD AUTO: 0.07 X10(3) UL (ref 0–0.2)
BASOPHILS NFR BLD AUTO: 1.2 %
BUN BLD-MCNC: 66 MG/DL (ref 7–18)
CALCIUM BLD-MCNC: 7.9 MG/DL (ref 8.5–10.1)
CHLORIDE SERPL-SCNC: 111 MMOL/L (ref 98–112)
CO2 SERPL-SCNC: 24 MMOL/L (ref 21–32)
CREAT BLD-MCNC: 8.13 MG/DL
EGFRCR SERPLBLD CKD-EPI 2021: 8 ML/MIN/1.73M2 (ref 60–?)
EOSINOPHIL # BLD AUTO: 0.31 X10(3) UL (ref 0–0.7)
EOSINOPHIL NFR BLD AUTO: 5.3 %
ERYTHROCYTE [DISTWIDTH] IN BLOOD BY AUTOMATED COUNT: 13 %
GLUCOSE BLD-MCNC: 138 MG/DL (ref 70–99)
HCT VFR BLD AUTO: 33.1 %
HGB BLD-MCNC: 11.7 G/DL
IMM GRANULOCYTES # BLD AUTO: 0.01 X10(3) UL (ref 0–1)
IMM GRANULOCYTES NFR BLD: 0.2 %
LYMPHOCYTES # BLD AUTO: 1.85 X10(3) UL (ref 1–4)
LYMPHOCYTES NFR BLD AUTO: 31.7 %
MCH RBC QN AUTO: 31 PG (ref 26–34)
MCHC RBC AUTO-ENTMCNC: 35.3 G/DL (ref 31–37)
MCV RBC AUTO: 87.8 FL
MONOCYTES # BLD AUTO: 0.7 X10(3) UL (ref 0.1–1)
MONOCYTES NFR BLD AUTO: 12 %
NEUTROPHILS # BLD AUTO: 2.89 X10 (3) UL (ref 1.5–7.7)
NEUTROPHILS # BLD AUTO: 2.89 X10(3) UL (ref 1.5–7.7)
NEUTROPHILS NFR BLD AUTO: 49.6 %
OSMOLALITY SERPL CALC.SUM OF ELEC: 315 MOSM/KG (ref 275–295)
P AXIS: 38 DEGREES
P-R INTERVAL: 192 MS
PLATELET # BLD AUTO: 207 10(3)UL (ref 150–450)
POTASSIUM SERPL-SCNC: 3.1 MMOL/L (ref 3.5–5.1)
Q-T INTERVAL: 406 MS
QRS DURATION: 98 MS
QTC CALCULATION (BEZET): 491 MS
R AXIS: 6 DEGREES
RBC # BLD AUTO: 3.77 X10(6)UL
SODIUM SERPL-SCNC: 142 MMOL/L (ref 136–145)
T AXIS: 41 DEGREES
VENTRICULAR RATE: 88 BPM
WBC # BLD AUTO: 5.8 X10(3) UL (ref 4–11)

## 2023-07-27 PROCEDURE — 80048 BASIC METABOLIC PNL TOTAL CA: CPT | Performed by: HOSPITALIST

## 2023-07-27 PROCEDURE — 90935 HEMODIALYSIS ONE EVALUATION: CPT

## 2023-07-27 PROCEDURE — 85025 COMPLETE CBC W/AUTO DIFF WBC: CPT | Performed by: HOSPITALIST

## 2023-07-27 RX ORDER — FENOFIBRATE 134 MG/1
134 CAPSULE ORAL NIGHTLY
Status: DISCONTINUED | OUTPATIENT
Start: 2023-07-27 | End: 2023-07-27

## 2023-07-27 RX ORDER — ISOSORBIDE MONONITRATE 30 MG/1
60 TABLET, EXTENDED RELEASE ORAL DAILY
Status: DISCONTINUED | OUTPATIENT
Start: 2023-07-27 | End: 2023-07-27

## 2023-07-27 RX ORDER — HYDRALAZINE HYDROCHLORIDE 50 MG/1
100 TABLET, FILM COATED ORAL 3 TIMES DAILY PRN
Status: DISCONTINUED | OUTPATIENT
Start: 2023-07-27 | End: 2023-07-28

## 2023-07-27 RX ORDER — DEXTROAMPHETAMINE SACCHARATE, AMPHETAMINE ASPARTATE, DEXTROAMPHETAMINE SULFATE AND AMPHETAMINE SULFATE 5; 5; 5; 5 MG/1; MG/1; MG/1; MG/1
20 TABLET ORAL 2 TIMES DAILY
COMMUNITY

## 2023-07-27 RX ORDER — HEPARIN SODIUM 1000 [USP'U]/ML
1.5 INJECTION, SOLUTION INTRAVENOUS; SUBCUTANEOUS ONCE
Status: DISCONTINUED | OUTPATIENT
Start: 2023-07-28 | End: 2023-07-28

## 2023-07-27 RX ORDER — ONDANSETRON 2 MG/ML
4 INJECTION INTRAMUSCULAR; INTRAVENOUS EVERY 6 HOURS PRN
Status: DISCONTINUED | OUTPATIENT
Start: 2023-07-27 | End: 2023-07-28

## 2023-07-27 RX ORDER — ONDANSETRON 2 MG/ML
4 INJECTION INTRAMUSCULAR; INTRAVENOUS EVERY 4 HOURS PRN
Status: ACTIVE | OUTPATIENT
Start: 2023-07-27 | End: 2023-07-27

## 2023-07-27 RX ORDER — ISOSORBIDE MONONITRATE 30 MG/1
30 TABLET, EXTENDED RELEASE ORAL DAILY
Status: DISCONTINUED | OUTPATIENT
Start: 2023-07-27 | End: 2023-07-28

## 2023-07-27 RX ORDER — HEPARIN SODIUM 5000 [USP'U]/ML
5000 INJECTION, SOLUTION INTRAVENOUS; SUBCUTANEOUS EVERY 8 HOURS SCHEDULED
Status: DISCONTINUED | OUTPATIENT
Start: 2023-07-27 | End: 2023-07-28

## 2023-07-27 RX ORDER — CLONIDINE HYDROCHLORIDE 0.1 MG/1
0.1 TABLET ORAL 2 TIMES DAILY
Status: DISCONTINUED | OUTPATIENT
Start: 2023-07-27 | End: 2023-07-28

## 2023-07-27 RX ORDER — LAMOTRIGINE 150 MG/1
150 TABLET ORAL DAILY
Status: DISCONTINUED | OUTPATIENT
Start: 2023-07-27 | End: 2023-07-28

## 2023-07-27 RX ORDER — FLUTICASONE PROPIONATE 50 MCG
1 SPRAY, SUSPENSION (ML) NASAL 2 TIMES DAILY
Status: DISCONTINUED | OUTPATIENT
Start: 2023-07-27 | End: 2023-07-28

## 2023-07-27 RX ORDER — BUTALBITAL, ACETAMINOPHEN AND CAFFEINE 50; 325; 40 MG/1; MG/1; MG/1
1 TABLET ORAL EVERY 4 HOURS PRN
Status: DISCONTINUED | OUTPATIENT
Start: 2023-07-27 | End: 2023-07-28

## 2023-07-27 RX ORDER — CARVEDILOL 12.5 MG/1
25 TABLET ORAL 2 TIMES DAILY WITH MEALS
Status: DISCONTINUED | OUTPATIENT
Start: 2023-07-27 | End: 2023-07-28

## 2023-07-27 RX ORDER — DICYCLOMINE HYDROCHLORIDE 10 MG/1
10 CAPSULE ORAL
Status: DISCONTINUED | OUTPATIENT
Start: 2023-07-27 | End: 2023-07-28

## 2023-07-27 RX ORDER — HEPARIN SODIUM 1000 [USP'U]/ML
1.5 INJECTION, SOLUTION INTRAVENOUS; SUBCUTANEOUS ONCE
Status: COMPLETED | OUTPATIENT
Start: 2023-07-27 | End: 2023-07-27

## 2023-07-27 RX ORDER — NIFEDIPINE 60 MG/1
60 TABLET, EXTENDED RELEASE ORAL EVERY MORNING
Status: DISCONTINUED | OUTPATIENT
Start: 2023-07-27 | End: 2023-07-28

## 2023-07-27 RX ORDER — BUTALBITAL, ACETAMINOPHEN AND CAFFEINE 50; 325; 40 MG/1; MG/1; MG/1
1 TABLET ORAL EVERY 4 HOURS PRN
Status: DISCONTINUED | OUTPATIENT
Start: 2023-07-27 | End: 2023-07-27

## 2023-07-27 RX ORDER — ALBUTEROL SULFATE 90 UG/1
1 AEROSOL, METERED RESPIRATORY (INHALATION) EVERY 4 HOURS PRN
Status: DISCONTINUED | OUTPATIENT
Start: 2023-07-27 | End: 2023-07-28

## 2023-07-27 RX ORDER — ACETAMINOPHEN 325 MG/1
650 TABLET ORAL EVERY 6 HOURS PRN
Status: DISCONTINUED | OUTPATIENT
Start: 2023-07-27 | End: 2023-07-28

## 2023-07-27 RX ORDER — HYDROMORPHONE HYDROCHLORIDE 1 MG/ML
0.5 INJECTION, SOLUTION INTRAMUSCULAR; INTRAVENOUS; SUBCUTANEOUS EVERY 30 MIN PRN
Status: DISPENSED | OUTPATIENT
Start: 2023-07-27 | End: 2023-07-27

## 2023-07-27 RX ORDER — FLUOXETINE HYDROCHLORIDE 20 MG/1
40 CAPSULE ORAL DAILY
Status: DISCONTINUED | OUTPATIENT
Start: 2023-07-27 | End: 2023-07-28

## 2023-07-27 RX ORDER — LABETALOL HYDROCHLORIDE 5 MG/ML
10 INJECTION, SOLUTION INTRAVENOUS ONCE
Status: COMPLETED | OUTPATIENT
Start: 2023-07-27 | End: 2023-07-27

## 2023-07-27 RX ORDER — NIFEDIPINE 30 MG/1
30 TABLET, EXTENDED RELEASE ORAL NIGHTLY
Status: DISCONTINUED | OUTPATIENT
Start: 2023-07-27 | End: 2023-07-28

## 2023-07-27 RX ORDER — ACETAMINOPHEN 10 MG/ML
1000 INJECTION, SOLUTION INTRAVENOUS EVERY 6 HOURS PRN
Status: DISCONTINUED | OUTPATIENT
Start: 2023-07-27 | End: 2023-07-27

## 2023-07-27 NOTE — PROGRESS NOTES
NURSING ADMISSION NOTE      Patient admitted via Ambulance  Oriented to room. Safety precautions initiated. Bed in low position. Call light in reach    Patient alert and oriented x 4, afebrile. Severe headache, CT head negative. BP elevated, patient on HD, missed HD appointments due to illness. BP meds given, Diluadid prn. Patient more comfortable, sbp<165.

## 2023-07-27 NOTE — PLAN OF CARE
Pt a/ox4. RA. Tele, SR. HD scheduled for tomorrow. Denies any pain during shift. Tolerating renal diet. Strict I&Os. Rt permacath. Pt updated on poc. Call light in reach. Safety precautions in place. All needs are met at this time.     Problem: Diabetes/Glucose Control  Goal: Glucose maintained within prescribed range  Description: INTERVENTIONS:  - Monitor Blood Glucose as ordered  - Assess for signs and symptoms of hyperglycemia and hypoglycemia  - Administer ordered medications to maintain glucose within target range  - Assess barriers to adequate nutritional intake and initiate nutrition consult as needed  - Instruct patient on self management of diabetes  Outcome: Progressing     Problem: Patient/Family Goals  Goal: Patient/Family Long Term Goal  Description: Patient's Long Term Goal: discharge home with adequate resources    Interventions:  - HD, BP meds, pain meds  - See additional Care Plan goals for specific interventions  Outcome: Progressing  Goal: Patient/Family Short Term Goal  Description: Patient's Short Term Goal: BP, headache under control    Interventions:   - HD, BP meds, pain meds  - See additional Care Plan goals for specific interventions  Outcome: Progressing

## 2023-07-27 NOTE — ED QUICK NOTES
Orders for admission, patient is aware of plan and ready to go upstairs. Any questions, please call ED RN Anoop Eli at extension [phone].      Patient Covid vaccination status: Fully vaccinated     COVID Test Ordered in ED: None    COVID Suspicion at Admission: N/A    Running Infusions:  None    Mental Status/LOC at time of transport: AOx4    Other pertinent information:   CIWA score: N/A   NIH score:  N/A

## 2023-07-28 VITALS
HEART RATE: 82 BPM | DIASTOLIC BLOOD PRESSURE: 71 MMHG | RESPIRATION RATE: 17 BRPM | WEIGHT: 254.69 LBS | TEMPERATURE: 98 F | HEIGHT: 74 IN | BODY MASS INDEX: 32.69 KG/M2 | SYSTOLIC BLOOD PRESSURE: 111 MMHG | OXYGEN SATURATION: 94 %

## 2023-07-28 LAB
ALBUMIN SERPL-MCNC: 3.5 G/DL (ref 3.4–5)
ANION GAP SERPL CALC-SCNC: 10 MMOL/L (ref 0–18)
BASOPHILS # BLD AUTO: 0.08 X10(3) UL (ref 0–0.2)
BASOPHILS NFR BLD AUTO: 1.3 %
BUN BLD-MCNC: 45 MG/DL (ref 7–18)
CALCIUM BLD-MCNC: 8.5 MG/DL (ref 8.5–10.1)
CHLORIDE SERPL-SCNC: 105 MMOL/L (ref 98–112)
CO2 SERPL-SCNC: 21 MMOL/L (ref 21–32)
CREAT BLD-MCNC: 6.35 MG/DL
EGFRCR SERPLBLD CKD-EPI 2021: 10 ML/MIN/1.73M2 (ref 60–?)
EOSINOPHIL # BLD AUTO: 0.31 X10(3) UL (ref 0–0.7)
EOSINOPHIL NFR BLD AUTO: 5.1 %
ERYTHROCYTE [DISTWIDTH] IN BLOOD BY AUTOMATED COUNT: 12.9 %
GLUCOSE BLD-MCNC: 129 MG/DL (ref 70–99)
HCT VFR BLD AUTO: 37.2 %
HGB BLD-MCNC: 13 G/DL
IMM GRANULOCYTES # BLD AUTO: 0.01 X10(3) UL (ref 0–1)
IMM GRANULOCYTES NFR BLD: 0.2 %
LYMPHOCYTES # BLD AUTO: 2.23 X10(3) UL (ref 1–4)
LYMPHOCYTES NFR BLD AUTO: 36.8 %
MCH RBC QN AUTO: 30 PG (ref 26–34)
MCHC RBC AUTO-ENTMCNC: 34.9 G/DL (ref 31–37)
MCV RBC AUTO: 85.9 FL
MONOCYTES # BLD AUTO: 0.61 X10(3) UL (ref 0.1–1)
MONOCYTES NFR BLD AUTO: 10.1 %
NEUTROPHILS # BLD AUTO: 2.82 X10 (3) UL (ref 1.5–7.7)
NEUTROPHILS # BLD AUTO: 2.82 X10(3) UL (ref 1.5–7.7)
NEUTROPHILS NFR BLD AUTO: 46.5 %
OSMOLALITY SERPL CALC.SUM OF ELEC: 295 MOSM/KG (ref 275–295)
PHOSPHATE SERPL-MCNC: 4.9 MG/DL (ref 2.5–4.9)
PLATELET # BLD AUTO: 263 10(3)UL (ref 150–450)
POTASSIUM SERPL-SCNC: 3 MMOL/L (ref 3.5–5.1)
RBC # BLD AUTO: 4.33 X10(6)UL
SODIUM SERPL-SCNC: 136 MMOL/L (ref 136–145)
WBC # BLD AUTO: 6.1 X10(3) UL (ref 4–11)

## 2023-07-28 PROCEDURE — 80069 RENAL FUNCTION PANEL: CPT | Performed by: INTERNAL MEDICINE

## 2023-07-28 PROCEDURE — 90935 HEMODIALYSIS ONE EVALUATION: CPT

## 2023-07-28 PROCEDURE — 85025 COMPLETE CBC W/AUTO DIFF WBC: CPT | Performed by: HOSPITALIST

## 2023-07-28 RX ORDER — ARIPIPRAZOLE 5 MG/1
15 TABLET ORAL DAILY
Qty: 42 TABLET | Refills: 0 | Status: SHIPPED | COMMUNITY
Start: 2023-07-28

## 2023-07-28 RX ORDER — HYDROCODONE BITARTRATE AND ACETAMINOPHEN 5; 325 MG/1; MG/1
2 TABLET ORAL EVERY 4 HOURS PRN
Status: DISCONTINUED | OUTPATIENT
Start: 2023-07-28 | End: 2023-07-28

## 2023-07-28 RX ORDER — POTASSIUM CHLORIDE 20 MEQ/1
40 TABLET, EXTENDED RELEASE ORAL ONCE
Status: COMPLETED | OUTPATIENT
Start: 2023-07-28 | End: 2023-07-28

## 2023-07-28 RX ORDER — HYDROCODONE BITARTRATE AND ACETAMINOPHEN 5; 325 MG/1; MG/1
1 TABLET ORAL EVERY 4 HOURS PRN
Status: DISCONTINUED | OUTPATIENT
Start: 2023-07-28 | End: 2023-07-28

## 2023-07-28 RX ORDER — BUTALBITAL, ACETAMINOPHEN AND CAFFEINE 50; 325; 40 MG/1; MG/1; MG/1
1 TABLET ORAL EVERY 6 HOURS PRN
Qty: 20 TABLET | Refills: 0 | Status: SHIPPED | OUTPATIENT
Start: 2023-07-28

## 2023-07-28 NOTE — PLAN OF CARE
Pt AOx4. VSS, afebrile. Spo2 >90% on RA. NSR on tele, heparin. C/o abd cramping, prn tylenol and bentyl given. C/o HA, prn meds given. Right permacath. No c/o n/v/d/c. HD pt. Pt updated on POC. Call light within reach, safety precautions in place. No further pt needs at this time.      Problem: Diabetes/Glucose Control  Goal: Glucose maintained within prescribed range  Description: INTERVENTIONS:  - Monitor Blood Glucose as ordered  - Assess for signs and symptoms of hyperglycemia and hypoglycemia  - Administer ordered medications to maintain glucose within target range  - Assess barriers to adequate nutritional intake and initiate nutrition consult as needed  - Instruct patient on self management of diabetes  Outcome: Progressing     Problem: Patient/Family Goals  Goal: Patient/Family Long Term Goal  Description: Patient's Long Term Goal: discharge home with adequate resources    Interventions:  - HD, BP meds, pain meds  - See additional Care Plan goals for specific interventions  Outcome: Progressing  Goal: Patient/Family Short Term Goal  Description: Patient's Short Term Goal: BP, headache under control    Interventions:   - HD, BP meds, pain meds  - See additional Care Plan goals for specific interventions  Outcome: Progressing

## 2023-07-28 NOTE — PLAN OF CARE
Problem: Diabetes/Glucose Control  Goal: Glucose maintained within prescribed range  Description: INTERVENTIONS:  - Monitor Blood Glucose as ordered  - Assess for signs and symptoms of hyperglycemia and hypoglycemia  - Administer ordered medications to maintain glucose within target range  - Assess barriers to adequate nutritional intake and initiate nutrition consult as needed  - Instruct patient on self management of diabetes  Outcome: Progressing     Problem: Patient/Family Goals  Goal: Patient/Family Long Term Goal  Description: Patient's Long Term Goal: discharge home with adequate resources    Interventions:  - HD, BP meds, pain meds  - See additional Care Plan goals for specific interventions  Outcome: Progressing  Goal: Patient/Family Short Term Goal  Description: Patient's Short Term Goal: BP, headache under control    Interventions:   - HD, BP meds, pain meds  - See additional Care Plan goals for specific interventions  Outcome: Progressing

## 2023-08-22 ENCOUNTER — APPOINTMENT (OUTPATIENT)
Dept: GENERAL RADIOLOGY | Age: 43
End: 2023-08-22
Attending: EMERGENCY MEDICINE
Payer: MEDICAID

## 2023-08-22 ENCOUNTER — HOSPITAL ENCOUNTER (INPATIENT)
Facility: HOSPITAL | Age: 43
LOS: 3 days | Discharge: HOME | End: 2023-08-25
Attending: EMERGENCY MEDICINE | Admitting: HOSPITALIST
Payer: MEDICAID

## 2023-08-22 DIAGNOSIS — E87.20 METABOLIC ACIDOSIS: ICD-10-CM

## 2023-08-22 DIAGNOSIS — R06.00 DYSPNEA, UNSPECIFIED TYPE: ICD-10-CM

## 2023-08-22 DIAGNOSIS — R51.9 ACUTE INTRACTABLE HEADACHE, UNSPECIFIED HEADACHE TYPE: ICD-10-CM

## 2023-08-22 DIAGNOSIS — R77.8 ELEVATED TROPONIN: ICD-10-CM

## 2023-08-22 DIAGNOSIS — I16.0 HYPERTENSIVE URGENCY: Primary | ICD-10-CM

## 2023-08-22 LAB
ALBUMIN SERPL-MCNC: 3.5 G/DL (ref 3.4–5)
ALBUMIN/GLOB SERPL: 0.9 {RATIO} (ref 1–2)
ALP LIVER SERPL-CCNC: 116 U/L
ALT SERPL-CCNC: 20 U/L
ANION GAP SERPL CALC-SCNC: 6 MMOL/L (ref 0–18)
AST SERPL-CCNC: 33 U/L (ref 15–37)
ATRIAL RATE: 96 BPM
BASOPHILS # BLD AUTO: 0.1 X10(3) UL (ref 0–0.2)
BASOPHILS NFR BLD AUTO: 1.4 %
BILIRUB SERPL-MCNC: 0.5 MG/DL (ref 0.1–2)
BUN BLD-MCNC: 41 MG/DL (ref 7–18)
CALCIUM BLD-MCNC: 8.6 MG/DL (ref 8.5–10.1)
CHLORIDE SERPL-SCNC: 115 MMOL/L (ref 98–112)
CHOLEST SERPL-MCNC: 229 MG/DL (ref ?–200)
CO2 SERPL-SCNC: 17 MMOL/L (ref 21–32)
CREAT BLD-MCNC: 5.08 MG/DL
EGFRCR SERPLBLD CKD-EPI 2021: 13 ML/MIN/1.73M2 (ref 60–?)
EOSINOPHIL # BLD AUTO: 0.28 X10(3) UL (ref 0–0.7)
EOSINOPHIL NFR BLD AUTO: 3.8 %
ERYTHROCYTE [DISTWIDTH] IN BLOOD BY AUTOMATED COUNT: 13.5 %
GLOBULIN PLAS-MCNC: 4.1 G/DL (ref 2.8–4.4)
GLUCOSE BLD-MCNC: 107 MG/DL (ref 70–99)
GLUCOSE BLD-MCNC: 139 MG/DL (ref 70–99)
GLUCOSE BLD-MCNC: 162 MG/DL (ref 70–99)
HCT VFR BLD AUTO: 33.7 %
HDLC SERPL-MCNC: 45 MG/DL (ref 40–59)
HGB BLD-MCNC: 11.4 G/DL
IMM GRANULOCYTES # BLD AUTO: 0.02 X10(3) UL (ref 0–1)
IMM GRANULOCYTES NFR BLD: 0.3 %
LDLC SERPL CALC-MCNC: 134 MG/DL (ref ?–100)
LYMPHOCYTES # BLD AUTO: 1.26 X10(3) UL (ref 1–4)
LYMPHOCYTES NFR BLD AUTO: 17.2 %
MAGNESIUM SERPL-MCNC: 2.1 MG/DL (ref 1.6–2.6)
MCH RBC QN AUTO: 30.6 PG (ref 26–34)
MCHC RBC AUTO-ENTMCNC: 33.8 G/DL (ref 31–37)
MCV RBC AUTO: 90.3 FL
MONOCYTES # BLD AUTO: 0.48 X10(3) UL (ref 0.1–1)
MONOCYTES NFR BLD AUTO: 6.5 %
NEUTROPHILS # BLD AUTO: 5.19 X10 (3) UL (ref 1.5–7.7)
NEUTROPHILS # BLD AUTO: 5.19 X10(3) UL (ref 1.5–7.7)
NEUTROPHILS NFR BLD AUTO: 70.8 %
NONHDLC SERPL-MCNC: 184 MG/DL (ref ?–130)
NT-PROBNP SERPL-MCNC: ABNORMAL PG/ML (ref ?–125)
OSMOLALITY SERPL CALC.SUM OF ELEC: 298 MOSM/KG (ref 275–295)
P AXIS: 41 DEGREES
P-R INTERVAL: 190 MS
PHOSPHATE SERPL-MCNC: 3.8 MG/DL (ref 2.5–4.9)
PLATELET # BLD AUTO: 254 10(3)UL (ref 150–450)
POTASSIUM SERPL-SCNC: 4 MMOL/L (ref 3.5–5.1)
PROT SERPL-MCNC: 7.6 G/DL (ref 6.4–8.2)
Q-T INTERVAL: 392 MS
QRS DURATION: 88 MS
QTC CALCULATION (BEZET): 495 MS
R AXIS: 6 DEGREES
RBC # BLD AUTO: 3.73 X10(6)UL
SARS-COV-2 RNA RESP QL NAA+PROBE: NOT DETECTED
SODIUM SERPL-SCNC: 138 MMOL/L (ref 136–145)
T AXIS: 105 DEGREES
TRIGL SERPL-MCNC: 278 MG/DL (ref 30–149)
TROPONIN I HIGH SENSITIVITY: 548 NG/L
TROPONIN I HIGH SENSITIVITY: 724 NG/L
VENTRICULAR RATE: 96 BPM
VLDLC SERPL CALC-MCNC: 52 MG/DL (ref 0–30)
WBC # BLD AUTO: 7.3 X10(3) UL (ref 4–11)

## 2023-08-22 PROCEDURE — 5A1D70Z PERFORMANCE OF URINARY FILTRATION, INTERMITTENT, LESS THAN 6 HOURS PER DAY: ICD-10-PCS | Performed by: INTERNAL MEDICINE

## 2023-08-22 PROCEDURE — 71045 X-RAY EXAM CHEST 1 VIEW: CPT | Performed by: EMERGENCY MEDICINE

## 2023-08-22 RX ORDER — LAMOTRIGINE 150 MG/1
150 TABLET ORAL DAILY
Status: DISCONTINUED | OUTPATIENT
Start: 2023-08-23 | End: 2023-08-25

## 2023-08-22 RX ORDER — ACETAMINOPHEN 500 MG
500 TABLET ORAL EVERY 4 HOURS PRN
Status: DISCONTINUED | OUTPATIENT
Start: 2023-08-22 | End: 2023-08-22

## 2023-08-22 RX ORDER — FUROSEMIDE 40 MG/1
80 TABLET ORAL 2 TIMES DAILY
Status: DISCONTINUED | OUTPATIENT
Start: 2023-08-22 | End: 2023-08-23

## 2023-08-22 RX ORDER — HYDRALAZINE HYDROCHLORIDE 25 MG/1
25 TABLET, FILM COATED ORAL 2 TIMES DAILY PRN
Status: DISCONTINUED | OUTPATIENT
Start: 2023-08-22 | End: 2023-08-25

## 2023-08-22 RX ORDER — ALBUTEROL SULFATE 90 UG/1
1 AEROSOL, METERED RESPIRATORY (INHALATION) EVERY 4 HOURS PRN
Status: DISCONTINUED | OUTPATIENT
Start: 2023-08-22 | End: 2023-08-25

## 2023-08-22 RX ORDER — CARVEDILOL 12.5 MG/1
25 TABLET ORAL 2 TIMES DAILY WITH MEALS
Status: DISCONTINUED | OUTPATIENT
Start: 2023-08-23 | End: 2023-08-23

## 2023-08-22 RX ORDER — ISOSORBIDE MONONITRATE 30 MG/1
60 TABLET, EXTENDED RELEASE ORAL DAILY
Status: DISCONTINUED | OUTPATIENT
Start: 2023-08-23 | End: 2023-08-25

## 2023-08-22 RX ORDER — FLUOXETINE HYDROCHLORIDE 20 MG/1
40 CAPSULE ORAL DAILY
Status: DISCONTINUED | OUTPATIENT
Start: 2023-08-23 | End: 2023-08-25

## 2023-08-22 RX ORDER — HYDROMORPHONE HYDROCHLORIDE 1 MG/ML
0.5 INJECTION, SOLUTION INTRAMUSCULAR; INTRAVENOUS; SUBCUTANEOUS EVERY 30 MIN PRN
Status: COMPLETED | OUTPATIENT
Start: 2023-08-22 | End: 2023-08-22

## 2023-08-22 RX ORDER — HEPARIN SODIUM 5000 [USP'U]/ML
5000 INJECTION, SOLUTION INTRAVENOUS; SUBCUTANEOUS EVERY 12 HOURS SCHEDULED
Status: DISCONTINUED | OUTPATIENT
Start: 2023-08-22 | End: 2023-08-25

## 2023-08-22 RX ORDER — FUROSEMIDE 80 MG
1 TABLET ORAL 2 TIMES DAILY
COMMUNITY
Start: 2023-06-08

## 2023-08-22 RX ORDER — BUTALBITAL, ACETAMINOPHEN AND CAFFEINE 50; 325; 40 MG/1; MG/1; MG/1
1 TABLET ORAL EVERY 6 HOURS PRN
Status: DISCONTINUED | OUTPATIENT
Start: 2023-08-22 | End: 2023-08-22

## 2023-08-22 RX ORDER — HEPARIN SODIUM 1000 [USP'U]/ML
1.5 INJECTION, SOLUTION INTRAVENOUS; SUBCUTANEOUS ONCE
Status: COMPLETED | OUTPATIENT
Start: 2023-08-22 | End: 2023-08-23

## 2023-08-22 RX ORDER — ONDANSETRON 2 MG/ML
4 INJECTION INTRAMUSCULAR; INTRAVENOUS ONCE
Status: COMPLETED | OUTPATIENT
Start: 2023-08-22 | End: 2023-08-22

## 2023-08-22 RX ORDER — LABETALOL HYDROCHLORIDE 5 MG/ML
10 INJECTION, SOLUTION INTRAVENOUS ONCE
Status: COMPLETED | OUTPATIENT
Start: 2023-08-22 | End: 2023-08-22

## 2023-08-22 RX ORDER — ACETAMINOPHEN 500 MG
500 TABLET ORAL EVERY 6 HOURS PRN
Status: DISCONTINUED | OUTPATIENT
Start: 2023-08-22 | End: 2023-08-23

## 2023-08-22 RX ORDER — PROCHLORPERAZINE EDISYLATE 5 MG/ML
5 INJECTION INTRAMUSCULAR; INTRAVENOUS EVERY 8 HOURS PRN
Status: DISCONTINUED | OUTPATIENT
Start: 2023-08-22 | End: 2023-08-25

## 2023-08-22 RX ORDER — NIFEDIPINE 30 MG/1
30 TABLET, EXTENDED RELEASE ORAL NIGHTLY
Status: DISCONTINUED | OUTPATIENT
Start: 2023-08-22 | End: 2023-08-25

## 2023-08-22 RX ORDER — NIFEDIPINE 30 MG/1
60 TABLET, EXTENDED RELEASE ORAL EVERY MORNING
Status: DISCONTINUED | OUTPATIENT
Start: 2023-08-23 | End: 2023-08-25

## 2023-08-22 RX ORDER — ONDANSETRON 2 MG/ML
4 INJECTION INTRAMUSCULAR; INTRAVENOUS EVERY 6 HOURS PRN
Status: DISCONTINUED | OUTPATIENT
Start: 2023-08-22 | End: 2023-08-25

## 2023-08-22 NOTE — ED QUICK NOTES
Orders for admission, patient is aware of plan and ready to go upstairs. Any questions, please call ED RN Asuncion Oliver at extension [phone].      Patient Covid vaccination status: Fully vaccinated     COVID Test Ordered in ED: Rapid SARS-CoV-2 by PCR    COVID Suspicion at Admission: N/A    Running Infusions:  None    Mental Status/LOC at time of transport: a/ox4    Other pertinent information:   CIWA score: N/A   NIH score:  N/A

## 2023-08-22 NOTE — ED INITIAL ASSESSMENT (HPI)
Pt states he missed Thursday, Saturday and today dialysis because he has not been feeling well for the past week with fatigue and diarrhea. Today pt also reports headache, SOB and htn.

## 2023-08-23 LAB
ALBUMIN SERPL-MCNC: 3.7 G/DL (ref 3.4–5)
ANION GAP SERPL CALC-SCNC: 6 MMOL/L (ref 0–18)
BUN BLD-MCNC: 28 MG/DL (ref 7–18)
CALCIUM BLD-MCNC: 8.6 MG/DL (ref 8.5–10.1)
CHLORIDE SERPL-SCNC: 108 MMOL/L (ref 98–112)
CO2 SERPL-SCNC: 26 MMOL/L (ref 21–32)
CREAT BLD-MCNC: 3.19 MG/DL
EGFRCR SERPLBLD CKD-EPI 2021: 24 ML/MIN/1.73M2 (ref 60–?)
GLUCOSE BLD-MCNC: 110 MG/DL (ref 70–99)
GLUCOSE BLD-MCNC: 130 MG/DL (ref 70–99)
GLUCOSE BLD-MCNC: 157 MG/DL (ref 70–99)
GLUCOSE BLD-MCNC: 95 MG/DL (ref 70–99)
GLUCOSE BLD-MCNC: 95 MG/DL (ref 70–99)
MAGNESIUM SERPL-MCNC: 1.9 MG/DL (ref 1.6–2.6)
OSMOLALITY SERPL CALC.SUM OF ELEC: 295 MOSM/KG (ref 275–295)
PHOSPHATE SERPL-MCNC: 2.7 MG/DL (ref 2.5–4.9)
POTASSIUM SERPL-SCNC: 3.4 MMOL/L (ref 3.5–5.1)
SODIUM SERPL-SCNC: 140 MMOL/L (ref 136–145)

## 2023-08-23 RX ORDER — CARVEDILOL 12.5 MG/1
25 TABLET ORAL 2 TIMES DAILY WITH MEALS
Status: DISCONTINUED | OUTPATIENT
Start: 2023-08-23 | End: 2023-08-25

## 2023-08-23 RX ORDER — DEXTROSE MONOHYDRATE 25 G/50ML
50 INJECTION, SOLUTION INTRAVENOUS
Status: DISCONTINUED | OUTPATIENT
Start: 2023-08-23 | End: 2023-08-25

## 2023-08-23 RX ORDER — NICOTINE POLACRILEX 4 MG
30 LOZENGE BUCCAL
Status: DISCONTINUED | OUTPATIENT
Start: 2023-08-23 | End: 2023-08-25

## 2023-08-23 RX ORDER — CLONIDINE HYDROCHLORIDE 0.1 MG/1
0.1 TABLET ORAL 2 TIMES DAILY
Status: DISCONTINUED | OUTPATIENT
Start: 2023-08-23 | End: 2023-08-25

## 2023-08-23 RX ORDER — NICOTINE POLACRILEX 4 MG
15 LOZENGE BUCCAL
Status: DISCONTINUED | OUTPATIENT
Start: 2023-08-23 | End: 2023-08-25

## 2023-08-23 RX ORDER — ACETAMINOPHEN 500 MG
1000 TABLET ORAL EVERY 8 HOURS PRN
Status: DISCONTINUED | OUTPATIENT
Start: 2023-08-23 | End: 2023-08-25

## 2023-08-23 NOTE — PROGRESS NOTES
NURSING ADMISSION NOTE      Patient admitted via Ambulance  Oriented to room. Safety precautions initiated. Bed in low position. Call light in reach. Received patient from [place] ED at 2015. Patient is alert and oriented x 4. Maintaining O2 saturation WNL on room air. No shortness of breath, chest pain or dizziness. Complaining of severe headache, PRN Dilaudid given. Pt missed 3 days of his hemodialysis due to diarrhea for several days and fatigue. Per patient last episode of diarrhea on Saturday. Hemodialysis done overnight 3L removed. BP slightly elevated during HD, PRN hydralazine given improved with HD. Per Dr. Angela Morrison Carvedilol given sooner. Patient up with SBA. Safety precautions in place, call light within reach, bed alarm on.

## 2023-08-23 NOTE — PLAN OF CARE
Pt AOx4. BP improved this am, still c/o headache. O2 sats maintained on RA, denies SOB. NSR on tele. Plan for Hospitalist and Nephrologist to see. Pt updated on plan of care. 17:06: /59. Pt reports that this is low for him and c/o dizziness. Denies headache. On call neph notified - no new orders just continue to closely monitor. Pt updated.     Problem: Patient/Family Goals  Goal: Patient/Family Long Term Goal  Description: Patient's Long Term Goal: to go home    Interventions:  - Hospitalist and Neph consult  - monitor BP  - pain management for headache  - See additional Care Plan goals for specific interventions  Outcome: Progressing  Goal: Patient/Family Short Term Goal  Description: Patient's Short Term Goal: relieve headache    Interventions:   - monitor and control BP  - analgesics  - See additional Care Plan goals for specific interventions  Outcome: Progressing

## 2023-08-24 ENCOUNTER — APPOINTMENT (OUTPATIENT)
Dept: MRI IMAGING | Facility: HOSPITAL | Age: 43
End: 2023-08-24
Attending: Other
Payer: MEDICAID

## 2023-08-24 LAB
ALBUMIN SERPL-MCNC: 3.3 G/DL (ref 3.4–5)
ANION GAP SERPL CALC-SCNC: 7 MMOL/L (ref 0–18)
BUN BLD-MCNC: 38 MG/DL (ref 7–18)
CALCIUM BLD-MCNC: 8.7 MG/DL (ref 8.5–10.1)
CHLORIDE SERPL-SCNC: 109 MMOL/L (ref 98–112)
CO2 SERPL-SCNC: 23 MMOL/L (ref 21–32)
CREAT BLD-MCNC: 4.74 MG/DL
EGFRCR SERPLBLD CKD-EPI 2021: 15 ML/MIN/1.73M2 (ref 60–?)
EST. AVERAGE GLUCOSE BLD GHB EST-MCNC: 108 MG/DL (ref 68–126)
GLUCOSE BLD-MCNC: 107 MG/DL (ref 70–99)
GLUCOSE BLD-MCNC: 148 MG/DL (ref 70–99)
GLUCOSE BLD-MCNC: 185 MG/DL (ref 70–99)
GLUCOSE BLD-MCNC: 98 MG/DL (ref 70–99)
GLUCOSE BLD-MCNC: 99 MG/DL (ref 70–99)
HBA1C MFR BLD: 5.4 % (ref ?–5.7)
MAGNESIUM SERPL-MCNC: 2.1 MG/DL (ref 1.6–2.6)
OSMOLALITY SERPL CALC.SUM OF ELEC: 297 MOSM/KG (ref 275–295)
PHOSPHATE SERPL-MCNC: 3.9 MG/DL (ref 2.5–4.9)
POTASSIUM SERPL-SCNC: 3.4 MMOL/L (ref 3.5–5.1)
SODIUM SERPL-SCNC: 139 MMOL/L (ref 136–145)

## 2023-08-24 PROCEDURE — 99222 1ST HOSP IP/OBS MODERATE 55: CPT | Performed by: OTHER

## 2023-08-24 PROCEDURE — 70551 MRI BRAIN STEM W/O DYE: CPT | Performed by: OTHER

## 2023-08-24 RX ORDER — HEPARIN SODIUM 1000 [USP'U]/ML
1.5 INJECTION, SOLUTION INTRAVENOUS; SUBCUTANEOUS ONCE
Status: COMPLETED | OUTPATIENT
Start: 2023-08-24 | End: 2023-08-24

## 2023-08-24 NOTE — PLAN OF CARE
Patient alert and oriented x 4. Pt complaining of headache, PRN Tylenol given. Maintaining O2 saturation WNL on room air. No shortness of breath or chest pain. NSR on tele monitor. Safety precautions in place, call light within reach, bed alarm on. Problem: PAIN - ADULT  Goal: Verbalizes/displays adequate comfort level or patient's stated pain goal  Description: INTERVENTIONS:  - Encourage pt to monitor pain and request assistance  - Assess pain using appropriate pain scale  - Administer analgesics based on type and severity of pain and evaluate response  - Implement non-pharmacological measures as appropriate and evaluate response  - Consider cultural and social influences on pain and pain management  - Manage/alleviate anxiety  - Utilize distraction and/or relaxation techniques  - Monitor for opioid side effects  - Notify MD/LIP if interventions unsuccessful or patient reports new pain  - Anticipate increased pain with activity and pre-medicate as appropriate  Outcome: Progressing     Problem: SAFETY ADULT - FALL  Goal: Free from fall injury  Description: INTERVENTIONS:  - Assess pt frequently for physical needs  - Identify cognitive and physical deficits and behaviors that affect risk of falls.   - [place] fall precautions as indicated by assessment.  - Educate pt/family on patient safety including physical limitations  - Instruct pt to call for assistance with activity based on assessment  - Modify environment to reduce risk of injury  - Provide assistive devices as appropriate  - Consider OT/PT consult to assist with strengthening/mobility  - Encourage toileting schedule  Outcome: Progressing     Problem: HEMATOLOGIC - ADULT  Goal: Maintains hematologic stability  Description: INTERVENTIONS  - Assess for signs and symptoms of bleeding or hemorrhage  - Monitor labs and vital signs for trends  - Administer supportive blood products/factors, fluids and medications as ordered and appropriate  - Administer supportive blood products/factors as ordered and appropriate  Outcome: Progressing

## 2023-08-24 NOTE — PLAN OF CARE
Patient is alert and orientated x4. Lung sounds are clear on room air. Normal sinus rhythm on tele. No complain of chest pain. Abdomen is soft, non-tendered, bowel sounds are active in all four quadrants. Bed in lower place, call light within reach.        Problem: Patient/Family Goals  Goal: Patient/Family Long Term Goal  Description: Patient's Long Term Goal: to go home    Interventions:  - Hospitalist and Neph consult  - monitor BP  - pain management for headache  - See additional Care Plan goals for specific interventions  Outcome: Progressing  Goal: Patient/Family Short Term Goal  Description: Patient's Short Term Goal: relieve headache    Interventions:   - monitor and control BP  - analgesics  - See additional Care Plan goals for specific interventions  Outcome: Progressing

## 2023-08-25 VITALS
HEIGHT: 74 IN | BODY MASS INDEX: 32.53 KG/M2 | RESPIRATION RATE: 18 BRPM | SYSTOLIC BLOOD PRESSURE: 103 MMHG | DIASTOLIC BLOOD PRESSURE: 66 MMHG | OXYGEN SATURATION: 100 % | TEMPERATURE: 98 F | WEIGHT: 253.5 LBS | HEART RATE: 87 BPM

## 2023-08-25 LAB
ALBUMIN SERPL-MCNC: 3.4 G/DL (ref 3.4–5)
ANION GAP SERPL CALC-SCNC: 7 MMOL/L (ref 0–18)
BUN BLD-MCNC: 33 MG/DL (ref 7–18)
CALCIUM BLD-MCNC: 9 MG/DL (ref 8.5–10.1)
CHLORIDE SERPL-SCNC: 106 MMOL/L (ref 98–112)
CO2 SERPL-SCNC: 24 MMOL/L (ref 21–32)
CREAT BLD-MCNC: 4.35 MG/DL
EGFRCR SERPLBLD CKD-EPI 2021: 16 ML/MIN/1.73M2 (ref 60–?)
GLUCOSE BLD-MCNC: 109 MG/DL (ref 70–99)
GLUCOSE BLD-MCNC: 110 MG/DL (ref 70–99)
GLUCOSE BLD-MCNC: 115 MG/DL (ref 70–99)
MAGNESIUM SERPL-MCNC: 2.2 MG/DL (ref 1.6–2.6)
OSMOLALITY SERPL CALC.SUM OF ELEC: 292 MOSM/KG (ref 275–295)
PHOSPHATE SERPL-MCNC: 3.7 MG/DL (ref 2.5–4.9)
POTASSIUM SERPL-SCNC: 3.5 MMOL/L (ref 3.5–5.1)
SODIUM SERPL-SCNC: 137 MMOL/L (ref 136–145)

## 2023-08-25 PROCEDURE — 99233 SBSQ HOSP IP/OBS HIGH 50: CPT | Performed by: OTHER

## 2023-08-25 RX ORDER — TRAMADOL HYDROCHLORIDE 50 MG/1
50 TABLET ORAL EVERY 12 HOURS PRN
Status: DISCONTINUED | OUTPATIENT
Start: 2023-08-25 | End: 2023-08-25

## 2023-08-25 RX ORDER — HEPARIN SODIUM 1000 [USP'U]/ML
1.5 INJECTION, SOLUTION INTRAVENOUS; SUBCUTANEOUS ONCE
Status: DISCONTINUED | OUTPATIENT
Start: 2023-08-26 | End: 2023-08-25

## 2023-08-25 NOTE — PLAN OF CARE
1100  Dialysis yestday  Plan for possible dc today  Anuric  C/o hand/ leg cramping  Ok dc per urology  Monitor bp

## 2023-08-25 NOTE — DISCHARGE INSTRUCTIONS
You have been advised to discuss this with his psychiatrist and to see if he could be switched from lamotrigine to valproic acid which would be beneficial for his headache also. In the meantime patient advised Tylenol as needed.

## 2023-08-25 NOTE — PROGRESS NOTES
[facility]acy Note:  Renal Dose Adjustment for Tramadol Niesha Bhat    Nelly Guadalupe has been prescribed Tramadol (ULTRAM) 50 mg orally every 6 hours as needed for pain. Estimated Creatinine Clearance: 22.9 mL/min (A) (based on SCr of 4.74 mg/dL (H)). His calculated creatinine clearance is < 30 ml/min, therefore, the dose of Tramadol (ULTRAM) has been changed to 50 mg every 12 hours as needed for pain per P&T approved protocol. Pharmacy will continue to follow, and if renal function improves, will resume the original order.      Thank you,  Kathryn Cunningham, PharmD  8/25/2023 12:17 AM

## 2023-08-25 NOTE — PLAN OF CARE
Assumed care of pt at 1930  A/Ox4, up w/ standby. Room air, NSR on tele. Pt denies shortness of breath and chest pain. Pt endorses slight headache w/ cramping to the legs and hands. Pt states he gets cramping after HD. PRN tylenol given. R perma cath in place and intact. Bed locked and in lowest position. Call light in reach. Pt updated on plan of care. 2325 - Notified MD of persistent cramping of legs and arms. No new orders at this time. 0005 - Notified MD of persistent cramping of legs and arms. PRN tramadol ordered.      Problem: Patient/Family Goals  Goal: Patient/Family Long Term Goal  Description: Patient's Long Term Goal: to go home    Interventions:  - Hospitalist and Neph consult  - monitor BP  - pain management for headache  - See additional Care Plan goals for specific interventions  Outcome: Progressing  Goal: Patient/Family Short Term Goal  Description: Patient's Short Term Goal: relieve headache    Interventions:   - monitor and control BP  - analgesics  - See additional Care Plan goals for specific interventions  Outcome: Progressing     Problem: PAIN - ADULT  Goal: Verbalizes/displays adequate comfort level or patient's stated pain goal  Description: INTERVENTIONS:  - Encourage pt to monitor pain and request assistance  - Assess pain using appropriate pain scale  - Administer analgesics based on type and severity of pain and evaluate response  - Implement non-pharmacological measures as appropriate and evaluate response  - Consider cultural and social influences on pain and pain management  - Manage/alleviate anxiety  - Utilize distraction and/or relaxation techniques  - Monitor for opioid side effects  - Notify MD/LIP if interventions unsuccessful or patient reports new pain  - Anticipate increased pain with activity and pre-medicate as appropriate  Outcome: Progressing     Problem: SAFETY ADULT - FALL  Goal: Free from fall injury  Description: INTERVENTIONS:  - Assess pt frequently for physical needs  - Identify cognitive and physical deficits and behaviors that affect risk of falls.   - [place] fall precautions as indicated by assessment.  - Educate pt/family on patient safety including physical limitations  - Instruct pt to call for assistance with activity based on assessment  - Modify environment to reduce risk of injury  - Provide assistive devices as appropriate  - Consider OT/PT consult to assist with strengthening/mobility  - Encourage toileting schedule  Outcome: Progressing     Problem: HEMATOLOGIC - ADULT  Goal: Maintains hematologic stability  Description: INTERVENTIONS  - Assess for signs and symptoms of bleeding or hemorrhage  - Monitor labs and vital signs for trends  - Administer supportive blood products/factors, fluids and medications as ordered and appropriate  - Administer supportive blood products/factors as ordered and appropriate  Outcome: Progressing

## 2023-08-28 NOTE — PAYOR COMM NOTE
--------------  DISCHARGE REVIEW    Payor: Suman Medellin #:  [id number]  Authorization Number: [id number]    Admit date: 8/22/23  Admit time:   7:54 PM  Discharge Date: 8/25/2023  4:00 PM     Admitting Physician: Teofilo Calderno MD  Attending Physician:  No att. providers found  Primary Care Physician: Vignesh Walter MD

## 2023-10-19 ENCOUNTER — APPOINTMENT (OUTPATIENT)
Dept: CT IMAGING | Age: 43
End: 2023-10-19
Attending: EMERGENCY MEDICINE
Payer: MEDICAID

## 2023-10-19 ENCOUNTER — HOSPITAL ENCOUNTER (EMERGENCY)
Age: 43
Discharge: HOME | End: 2023-10-19
Attending: EMERGENCY MEDICINE
Payer: MEDICAID

## 2023-10-19 VITALS
OXYGEN SATURATION: 95 % | BODY MASS INDEX: 33.37 KG/M2 | WEIGHT: 260 LBS | HEIGHT: 74 IN | DIASTOLIC BLOOD PRESSURE: 100 MMHG | SYSTOLIC BLOOD PRESSURE: 166 MMHG | TEMPERATURE: 99 F | RESPIRATION RATE: 22 BRPM | HEART RATE: 88 BPM

## 2023-10-19 DIAGNOSIS — J18.9 COMMUNITY ACQUIRED PNEUMONIA OF RIGHT LOWER LOBE OF LUNG: Primary | ICD-10-CM

## 2023-10-19 LAB
ALBUMIN SERPL-MCNC: 3 G/DL (ref 3.4–5)
ALBUMIN/GLOB SERPL: 0.8 {RATIO} (ref 1–2)
ALP LIVER SERPL-CCNC: 116 U/L
ALT SERPL-CCNC: 34 U/L
ANION GAP SERPL CALC-SCNC: 7 MMOL/L (ref 0–18)
AST SERPL-CCNC: 18 U/L (ref 15–37)
BASOPHILS # BLD AUTO: 0.07 X10(3) UL (ref 0–0.2)
BASOPHILS NFR BLD AUTO: 1.2 %
BILIRUB SERPL-MCNC: 0.4 MG/DL (ref 0.1–2)
BUN BLD-MCNC: 53 MG/DL (ref 7–18)
CALCIUM BLD-MCNC: 7.7 MG/DL (ref 8.5–10.1)
CHLORIDE SERPL-SCNC: 107 MMOL/L (ref 98–112)
CHOLEST SERPL-MCNC: 170 MG/DL (ref ?–200)
CO2 SERPL-SCNC: 27 MMOL/L (ref 21–32)
CREAT BLD-MCNC: 5.56 MG/DL
D DIMER PPP FEU-MCNC: 1.44 UG/ML FEU (ref ?–0.5)
EGFRCR SERPLBLD CKD-EPI 2021: 12 ML/MIN/1.73M2 (ref 60–?)
EOSINOPHIL # BLD AUTO: 0.47 X10(3) UL (ref 0–0.7)
EOSINOPHIL NFR BLD AUTO: 8.3 %
ERYTHROCYTE [DISTWIDTH] IN BLOOD BY AUTOMATED COUNT: 13.4 %
GLOBULIN PLAS-MCNC: 3.7 G/DL (ref 2.8–4.4)
GLUCOSE BLD-MCNC: 147 MG/DL (ref 70–99)
HCT VFR BLD AUTO: 30.1 %
HDLC SERPL-MCNC: 48 MG/DL (ref 40–59)
HGB BLD-MCNC: 10.2 G/DL
IMM GRANULOCYTES # BLD AUTO: 0.02 X10(3) UL (ref 0–1)
IMM GRANULOCYTES NFR BLD: 0.4 %
LDLC SERPL CALC-MCNC: 89 MG/DL (ref ?–100)
LYMPHOCYTES # BLD AUTO: 1.36 X10(3) UL (ref 1–4)
LYMPHOCYTES NFR BLD AUTO: 24.1 %
MCH RBC QN AUTO: 31.2 PG (ref 26–34)
MCHC RBC AUTO-ENTMCNC: 33.9 G/DL (ref 31–37)
MCV RBC AUTO: 92 FL
MONOCYTES # BLD AUTO: 0.82 X10(3) UL (ref 0.1–1)
MONOCYTES NFR BLD AUTO: 14.5 %
NEUTROPHILS # BLD AUTO: 2.91 X10 (3) UL (ref 1.5–7.7)
NEUTROPHILS # BLD AUTO: 2.91 X10(3) UL (ref 1.5–7.7)
NEUTROPHILS NFR BLD AUTO: 51.5 %
NONHDLC SERPL-MCNC: 122 MG/DL (ref ?–130)
NT-PROBNP SERPL-MCNC: 3926 PG/ML (ref ?–125)
OSMOLALITY SERPL CALC.SUM OF ELEC: 309 MOSM/KG (ref 275–295)
PLATELET # BLD AUTO: 224 10(3)UL (ref 150–450)
POCT INFLUENZA A: NEGATIVE
POCT INFLUENZA B: NEGATIVE
POTASSIUM SERPL-SCNC: 3.7 MMOL/L (ref 3.5–5.1)
PROT SERPL-MCNC: 6.7 G/DL (ref 6.4–8.2)
RBC # BLD AUTO: 3.27 X10(6)UL
SARS-COV-2 RNA RESP QL NAA+PROBE: NOT DETECTED
SODIUM SERPL-SCNC: 141 MMOL/L (ref 136–145)
TRIGL SERPL-MCNC: 192 MG/DL (ref 30–149)
TROPONIN I HIGH SENSITIVITY: 189 NG/L
VLDLC SERPL CALC-MCNC: 31 MG/DL (ref 0–30)
WBC # BLD AUTO: 5.7 X10(3) UL (ref 4–11)

## 2023-10-19 PROCEDURE — 93010 ELECTROCARDIOGRAM REPORT: CPT

## 2023-10-19 PROCEDURE — 99285 EMERGENCY DEPT VISIT HI MDM: CPT

## 2023-10-19 PROCEDURE — 93005 ELECTROCARDIOGRAM TRACING: CPT

## 2023-10-19 PROCEDURE — 84484 ASSAY OF TROPONIN QUANT: CPT | Performed by: EMERGENCY MEDICINE

## 2023-10-19 PROCEDURE — 87502 INFLUENZA DNA AMP PROBE: CPT | Performed by: EMERGENCY MEDICINE

## 2023-10-19 PROCEDURE — 83880 ASSAY OF NATRIURETIC PEPTIDE: CPT | Performed by: EMERGENCY MEDICINE

## 2023-10-19 PROCEDURE — 85379 FIBRIN DEGRADATION QUANT: CPT | Performed by: EMERGENCY MEDICINE

## 2023-10-19 PROCEDURE — 80053 COMPREHEN METABOLIC PANEL: CPT | Performed by: EMERGENCY MEDICINE

## 2023-10-19 PROCEDURE — 71275 CT ANGIOGRAPHY CHEST: CPT | Performed by: EMERGENCY MEDICINE

## 2023-10-19 PROCEDURE — 80061 LIPID PANEL: CPT | Performed by: EMERGENCY MEDICINE

## 2023-10-19 PROCEDURE — 85025 COMPLETE CBC W/AUTO DIFF WBC: CPT | Performed by: EMERGENCY MEDICINE

## 2023-10-19 PROCEDURE — 36415 COLL VENOUS BLD VENIPUNCTURE: CPT

## 2023-10-19 PROCEDURE — 99284 EMERGENCY DEPT VISIT MOD MDM: CPT

## 2023-10-19 RX ORDER — AZITHROMYCIN 250 MG/1
TABLET, FILM COATED ORAL
Qty: 6 TABLET | Refills: 0 | Status: SHIPPED | OUTPATIENT
Start: 2023-10-19 | End: 2023-10-25

## 2023-10-19 RX ORDER — ACETAMINOPHEN 500 MG
1000 TABLET ORAL ONCE
Status: DISCONTINUED | OUTPATIENT
Start: 2023-10-19 | End: 2023-10-19

## 2023-10-19 RX ORDER — ACETAMINOPHEN 500 MG
1000 TABLET ORAL ONCE
Status: COMPLETED | OUTPATIENT
Start: 2023-10-19 | End: 2023-10-19

## 2023-10-19 RX ORDER — NITROGLYCERIN 0.4 MG/1
0.4 TABLET SUBLINGUAL ONCE
Status: COMPLETED | OUTPATIENT
Start: 2023-10-19 | End: 2023-10-19

## 2023-10-19 RX ORDER — AMOXICILLIN AND CLAVULANATE POTASSIUM 875; 125 MG/1; MG/1
1 TABLET, FILM COATED ORAL 2 TIMES DAILY
Qty: 14 TABLET | Refills: 0 | Status: SHIPPED | OUTPATIENT
Start: 2023-10-19 | End: 2023-10-25

## 2023-10-19 NOTE — DISCHARGE INSTRUCTIONS
Recommend you follow-up with Dr. Corona Mason or your primary cardiologist    Begin antibiotics    Go to dialysis today

## 2023-10-19 NOTE — ED INITIAL ASSESSMENT (HPI)
Chest pain started yesterday, comes and goes, had dialysis yesterday and they told me if I was still having trouble breathing I need to come in to the ER, pain is right side neck radiating down to mid chest, short of breath, dizziness

## 2023-10-20 LAB
ATRIAL RATE: 91 BPM
P AXIS: 17 DEGREES
P-R INTERVAL: 184 MS
Q-T INTERVAL: 406 MS
QRS DURATION: 86 MS
QTC CALCULATION (BEZET): 499 MS
R AXIS: 11 DEGREES
T AXIS: 167 DEGREES
VENTRICULAR RATE: 91 BPM

## 2023-10-24 ENCOUNTER — HOSPITAL ENCOUNTER (OUTPATIENT)
Facility: HOSPITAL | Age: 43
Setting detail: OBSERVATION
Discharge: HOME | End: 2023-10-25
Attending: EMERGENCY MEDICINE | Admitting: HOSPITALIST

## 2023-10-24 ENCOUNTER — APPOINTMENT (OUTPATIENT)
Dept: CT IMAGING | Facility: HOSPITAL | Age: 43
End: 2023-10-24
Attending: EMERGENCY MEDICINE

## 2023-10-24 DIAGNOSIS — N18.6 ESRD ON DIALYSIS (HCC): ICD-10-CM

## 2023-10-24 DIAGNOSIS — K62.5 RECTAL BLEEDING: ICD-10-CM

## 2023-10-24 DIAGNOSIS — R10.9 ABDOMINAL PAIN, ACUTE: Primary | ICD-10-CM

## 2023-10-24 DIAGNOSIS — Z99.2 ESRD ON DIALYSIS (HCC): ICD-10-CM

## 2023-10-24 LAB
ALBUMIN SERPL-MCNC: 3.8 G/DL (ref 3.4–5)
ALBUMIN/GLOB SERPL: 0.8 {RATIO} (ref 1–2)
ALP LIVER SERPL-CCNC: 156 U/L
ALT SERPL-CCNC: 35 U/L
ANION GAP SERPL CALC-SCNC: 10 MMOL/L (ref 0–18)
AST SERPL-CCNC: 41 U/L (ref 15–37)
BASOPHILS # BLD AUTO: 0.12 X10(3) UL (ref 0–0.2)
BASOPHILS NFR BLD AUTO: 1.6 %
BILIRUB SERPL-MCNC: 0.6 MG/DL (ref 0.1–2)
BILIRUB UR QL STRIP.AUTO: NEGATIVE
BUN BLD-MCNC: 65 MG/DL (ref 7–18)
CALCIUM BLD-MCNC: 9.1 MG/DL (ref 8.5–10.1)
CHLORIDE SERPL-SCNC: 108 MMOL/L (ref 98–112)
CLARITY UR REFRACT.AUTO: CLEAR
CO2 SERPL-SCNC: 19 MMOL/L (ref 21–32)
CREAT BLD-MCNC: 7.89 MG/DL
EGFRCR SERPLBLD CKD-EPI 2021: 8 ML/MIN/1.73M2 (ref 60–?)
EOSINOPHIL # BLD AUTO: 0.37 X10(3) UL (ref 0–0.7)
EOSINOPHIL NFR BLD AUTO: 5 %
ERYTHROCYTE [DISTWIDTH] IN BLOOD BY AUTOMATED COUNT: 13 %
GLOBULIN PLAS-MCNC: 4.7 G/DL (ref 2.8–4.4)
GLUCOSE BLD-MCNC: 136 MG/DL (ref 70–99)
GLUCOSE BLD-MCNC: 148 MG/DL (ref 70–99)
GLUCOSE UR STRIP.AUTO-MCNC: NORMAL MG/DL
HCT VFR BLD AUTO: 36.6 %
HGB BLD-MCNC: 12.3 G/DL
IMM GRANULOCYTES # BLD AUTO: 0.01 X10(3) UL (ref 0–1)
IMM GRANULOCYTES NFR BLD: 0.1 %
KETONES UR STRIP.AUTO-MCNC: NEGATIVE MG/DL
LACTATE SERPL-SCNC: 1.1 MMOL/L (ref 0.4–2)
LEUKOCYTE ESTERASE UR QL STRIP.AUTO: NEGATIVE
LIPASE SERPL-CCNC: 92 U/L (ref 13–75)
LYMPHOCYTES # BLD AUTO: 1.92 X10(3) UL (ref 1–4)
LYMPHOCYTES NFR BLD AUTO: 26 %
MCH RBC QN AUTO: 30.5 PG (ref 26–34)
MCHC RBC AUTO-ENTMCNC: 33.6 G/DL (ref 31–37)
MCV RBC AUTO: 90.8 FL
MONOCYTES # BLD AUTO: 0.51 X10(3) UL (ref 0.1–1)
MONOCYTES NFR BLD AUTO: 6.9 %
NEUTROPHILS # BLD AUTO: 4.45 X10 (3) UL (ref 1.5–7.7)
NEUTROPHILS # BLD AUTO: 4.45 X10(3) UL (ref 1.5–7.7)
NEUTROPHILS NFR BLD AUTO: 60.4 %
NITRITE UR QL STRIP.AUTO: NEGATIVE
OSMOLALITY SERPL CALC.SUM OF ELEC: 305 MOSM/KG (ref 275–295)
PH UR STRIP.AUTO: 6.5 [PH] (ref 5–8)
PLATELET # BLD AUTO: 274 10(3)UL (ref 150–450)
POTASSIUM SERPL-SCNC: 3.9 MMOL/L (ref 3.5–5.1)
PROT SERPL-MCNC: 8.5 G/DL (ref 6.4–8.2)
PROT UR STRIP.AUTO-MCNC: 100 MG/DL
RBC # BLD AUTO: 4.03 X10(6)UL
SODIUM SERPL-SCNC: 137 MMOL/L (ref 136–145)
SP GR UR STRIP.AUTO: 1.01 (ref 1–1.03)
UROBILINOGEN UR STRIP.AUTO-MCNC: NORMAL MG/DL
WBC # BLD AUTO: 7.4 X10(3) UL (ref 4–11)

## 2023-10-24 PROCEDURE — 83690 ASSAY OF LIPASE: CPT | Performed by: EMERGENCY MEDICINE

## 2023-10-24 PROCEDURE — 81001 URINALYSIS AUTO W/SCOPE: CPT | Performed by: EMERGENCY MEDICINE

## 2023-10-24 PROCEDURE — 85025 COMPLETE CBC W/AUTO DIFF WBC: CPT | Performed by: EMERGENCY MEDICINE

## 2023-10-24 PROCEDURE — 80053 COMPREHEN METABOLIC PANEL: CPT

## 2023-10-24 PROCEDURE — 99285 EMERGENCY DEPT VISIT HI MDM: CPT

## 2023-10-24 PROCEDURE — 83605 ASSAY OF LACTIC ACID: CPT | Performed by: EMERGENCY MEDICINE

## 2023-10-24 PROCEDURE — 81001 URINALYSIS AUTO W/SCOPE: CPT

## 2023-10-24 PROCEDURE — 83690 ASSAY OF LIPASE: CPT

## 2023-10-24 PROCEDURE — 80053 COMPREHEN METABOLIC PANEL: CPT | Performed by: EMERGENCY MEDICINE

## 2023-10-24 PROCEDURE — 85025 COMPLETE CBC W/AUTO DIFF WBC: CPT

## 2023-10-24 PROCEDURE — 82962 GLUCOSE BLOOD TEST: CPT

## 2023-10-24 PROCEDURE — 96375 TX/PRO/DX INJ NEW DRUG ADDON: CPT

## 2023-10-24 PROCEDURE — 96374 THER/PROPH/DIAG INJ IV PUSH: CPT

## 2023-10-24 PROCEDURE — 74177 CT ABD & PELVIS W/CONTRAST: CPT | Performed by: EMERGENCY MEDICINE

## 2023-10-24 RX ORDER — ACETAMINOPHEN 500 MG
500 TABLET ORAL EVERY 4 HOURS PRN
Status: DISCONTINUED | OUTPATIENT
Start: 2023-10-24 | End: 2023-10-25 | Stop reason: DRUGHIGH

## 2023-10-24 RX ORDER — DEXTROSE MONOHYDRATE 25 G/50ML
50 INJECTION, SOLUTION INTRAVENOUS
Status: DISCONTINUED | OUTPATIENT
Start: 2023-10-24 | End: 2023-10-25

## 2023-10-24 RX ORDER — ONDANSETRON 2 MG/ML
4 INJECTION INTRAMUSCULAR; INTRAVENOUS ONCE
Status: COMPLETED | OUTPATIENT
Start: 2023-10-24 | End: 2023-10-24

## 2023-10-24 RX ORDER — LOSARTAN POTASSIUM 50 MG/1
1 TABLET ORAL DAILY
COMMUNITY
Start: 2023-08-29

## 2023-10-24 RX ORDER — HYDROMORPHONE HYDROCHLORIDE 1 MG/ML
0.5 INJECTION, SOLUTION INTRAMUSCULAR; INTRAVENOUS; SUBCUTANEOUS EVERY 30 MIN PRN
Status: ACTIVE | OUTPATIENT
Start: 2023-10-24 | End: 2023-10-24

## 2023-10-24 RX ORDER — NICOTINE POLACRILEX 4 MG
30 LOZENGE BUCCAL
Status: DISCONTINUED | OUTPATIENT
Start: 2023-10-24 | End: 2023-10-25

## 2023-10-24 RX ORDER — PROCHLORPERAZINE EDISYLATE 5 MG/ML
5 INJECTION INTRAMUSCULAR; INTRAVENOUS EVERY 8 HOURS PRN
Status: DISCONTINUED | OUTPATIENT
Start: 2023-10-24 | End: 2023-10-25

## 2023-10-24 RX ORDER — SEVELAMER CARBONATE 800 MG/1
1 TABLET, FILM COATED ORAL
COMMUNITY
Start: 2023-08-08

## 2023-10-24 RX ORDER — BISACODYL 10 MG
10 SUPPOSITORY, RECTAL RECTAL
Status: DISCONTINUED | OUTPATIENT
Start: 2023-10-24 | End: 2023-10-25

## 2023-10-24 RX ORDER — ONDANSETRON 2 MG/ML
4 INJECTION INTRAMUSCULAR; INTRAVENOUS EVERY 6 HOURS PRN
Status: DISCONTINUED | OUTPATIENT
Start: 2023-10-24 | End: 2023-10-25

## 2023-10-24 RX ORDER — ONDANSETRON 2 MG/ML
4 INJECTION INTRAMUSCULAR; INTRAVENOUS EVERY 4 HOURS PRN
Status: ACTIVE | OUTPATIENT
Start: 2023-10-24 | End: 2023-10-24

## 2023-10-24 RX ORDER — SENNOSIDES 8.6 MG
17.2 TABLET ORAL NIGHTLY PRN
Status: DISCONTINUED | OUTPATIENT
Start: 2023-10-24 | End: 2023-10-25

## 2023-10-24 RX ORDER — ENEMA 19; 7 G/133ML; G/133ML
1 ENEMA RECTAL ONCE AS NEEDED
Status: DISCONTINUED | OUTPATIENT
Start: 2023-10-24 | End: 2023-10-25

## 2023-10-24 RX ORDER — NICOTINE POLACRILEX 4 MG
15 LOZENGE BUCCAL
Status: DISCONTINUED | OUTPATIENT
Start: 2023-10-24 | End: 2023-10-25

## 2023-10-24 RX ORDER — POLYETHYLENE GLYCOL 3350 17 G/17G
17 POWDER, FOR SOLUTION ORAL DAILY PRN
Status: DISCONTINUED | OUTPATIENT
Start: 2023-10-24 | End: 2023-10-25

## 2023-10-24 RX ORDER — POTASSIUM CHLORIDE 20 MEQ/1
20 TABLET, EXTENDED RELEASE ORAL DAILY
COMMUNITY

## 2023-10-24 RX ORDER — HYOSCYAMINE SULFATE 0.125 MG
0.12 TABLET ORAL DAILY
COMMUNITY
Start: 2023-07-17

## 2023-10-24 RX ORDER — HYDROMORPHONE HYDROCHLORIDE 1 MG/ML
0.5 INJECTION, SOLUTION INTRAMUSCULAR; INTRAVENOUS; SUBCUTANEOUS EVERY 30 MIN PRN
Status: DISCONTINUED | OUTPATIENT
Start: 2023-10-24 | End: 2023-10-25

## 2023-10-24 NOTE — ED QUICK NOTES
Orders for admission, patient is aware of plan and ready to go upstairs. Any questions, please call ED RN saul at extension [phone].      Patient Covid vaccination status: Fully vaccinated     COVID Test Ordered in ED: None    COVID Suspicion at Admission: N/A    Running Infusions:  None    Mental Status/LOC at time of transport: aox4    Other pertinent information: pt has missed x2 dialysis treatments  CIWA score: N/A   NIH score:  N/A

## 2023-10-24 NOTE — ED INITIAL ASSESSMENT (HPI)
A&Ox3 patient p/w abdominal pain and rectal bleeding x weeks    Patient had hernia surgery at [address] [place] [address],[address] in August and reports has been having issues ever since    Seen by PMD today and referred to ED for further w/u \"he thinks the bleeding might be from my mesh\"    Endorses intermittent bright red blood in stool over the past few weeks and n/v when pain increases    Denies any cp/sob/n/v/d/c/f/LH/vision changes/urinary sx at this time    RR even/NL, speaking in full clear sentences, ambulatory w/ steady gait

## 2023-10-25 VITALS
TEMPERATURE: 98 F | SYSTOLIC BLOOD PRESSURE: 126 MMHG | DIASTOLIC BLOOD PRESSURE: 89 MMHG | HEART RATE: 85 BPM | RESPIRATION RATE: 18 BRPM | OXYGEN SATURATION: 97 %

## 2023-10-25 LAB
ALBUMIN SERPL-MCNC: 3.2 G/DL (ref 3.4–5)
ALBUMIN/GLOB SERPL: 0.9 {RATIO} (ref 1–2)
ALP LIVER SERPL-CCNC: 125 U/L
ALT SERPL-CCNC: 29 U/L
ANION GAP SERPL CALC-SCNC: 8 MMOL/L (ref 0–18)
AST SERPL-CCNC: 29 U/L (ref 15–37)
BASOPHILS # BLD AUTO: 0.08 X10(3) UL (ref 0–0.2)
BASOPHILS NFR BLD AUTO: 1.5 %
BILIRUB SERPL-MCNC: 0.6 MG/DL (ref 0.1–2)
BUN BLD-MCNC: 61 MG/DL (ref 7–18)
CALCIUM BLD-MCNC: 8.4 MG/DL (ref 8.5–10.1)
CHLORIDE SERPL-SCNC: 111 MMOL/L (ref 98–112)
CO2 SERPL-SCNC: 18 MMOL/L (ref 21–32)
CREAT BLD-MCNC: 7.01 MG/DL
EGFRCR SERPLBLD CKD-EPI 2021: 9 ML/MIN/1.73M2 (ref 60–?)
EOSINOPHIL # BLD AUTO: 0.38 X10(3) UL (ref 0–0.7)
EOSINOPHIL NFR BLD AUTO: 6.9 %
ERYTHROCYTE [DISTWIDTH] IN BLOOD BY AUTOMATED COUNT: 12.9 %
GLOBULIN PLAS-MCNC: 3.7 G/DL (ref 2.8–4.4)
GLUCOSE BLD-MCNC: 112 MG/DL (ref 70–99)
GLUCOSE BLD-MCNC: 124 MG/DL (ref 70–99)
GLUCOSE BLD-MCNC: 133 MG/DL (ref 70–99)
HBV SURFACE AG SER-ACNC: <0.1 [IU]/L
HBV SURFACE AG SERPL QL IA: NONREACTIVE
HCT VFR BLD AUTO: 32.5 %
HGB BLD-MCNC: 10.7 G/DL
IMM GRANULOCYTES # BLD AUTO: 0.01 X10(3) UL (ref 0–1)
IMM GRANULOCYTES NFR BLD: 0.2 %
LYMPHOCYTES # BLD AUTO: 1.43 X10(3) UL (ref 1–4)
LYMPHOCYTES NFR BLD AUTO: 26 %
MCH RBC QN AUTO: 30.6 PG (ref 26–34)
MCHC RBC AUTO-ENTMCNC: 32.9 G/DL (ref 31–37)
MCV RBC AUTO: 92.9 FL
MONOCYTES # BLD AUTO: 0.46 X10(3) UL (ref 0.1–1)
MONOCYTES NFR BLD AUTO: 8.4 %
NEUTROPHILS # BLD AUTO: 3.14 X10 (3) UL (ref 1.5–7.7)
NEUTROPHILS # BLD AUTO: 3.14 X10(3) UL (ref 1.5–7.7)
NEUTROPHILS NFR BLD AUTO: 57 %
OSMOLALITY SERPL CALC.SUM OF ELEC: 303 MOSM/KG (ref 275–295)
PLATELET # BLD AUTO: 248 10(3)UL (ref 150–450)
POTASSIUM SERPL-SCNC: 4 MMOL/L (ref 3.5–5.1)
PROT SERPL-MCNC: 6.9 G/DL (ref 6.4–8.2)
RBC # BLD AUTO: 3.5 X10(6)UL
SODIUM SERPL-SCNC: 137 MMOL/L (ref 136–145)
WBC # BLD AUTO: 5.5 X10(3) UL (ref 4–11)

## 2023-10-25 PROCEDURE — 5A1D70Z PERFORMANCE OF URINARY FILTRATION, INTERMITTENT, LESS THAN 6 HOURS PER DAY: ICD-10-PCS | Performed by: INTERNAL MEDICINE

## 2023-10-25 PROCEDURE — 87340 HEPATITIS B SURFACE AG IA: CPT | Performed by: INTERNAL MEDICINE

## 2023-10-25 PROCEDURE — 90935 HEMODIALYSIS ONE EVALUATION: CPT

## 2023-10-25 PROCEDURE — 80053 COMPREHEN METABOLIC PANEL: CPT | Performed by: STUDENT IN AN ORGANIZED HEALTH CARE EDUCATION/TRAINING PROGRAM

## 2023-10-25 PROCEDURE — 82962 GLUCOSE BLOOD TEST: CPT

## 2023-10-25 PROCEDURE — 85025 COMPLETE CBC W/AUTO DIFF WBC: CPT | Performed by: STUDENT IN AN ORGANIZED HEALTH CARE EDUCATION/TRAINING PROGRAM

## 2023-10-25 RX ORDER — DEXTROAMPHETAMINE SACCHARATE, AMPHETAMINE ASPARTATE, DEXTROAMPHETAMINE SULFATE AND AMPHETAMINE SULFATE 2.5; 2.5; 2.5; 2.5 MG/1; MG/1; MG/1; MG/1
20 TABLET ORAL 2 TIMES DAILY
Status: DISCONTINUED | OUTPATIENT
Start: 2023-10-25 | End: 2023-10-25

## 2023-10-25 RX ORDER — ACETAMINOPHEN 500 MG
500 TABLET ORAL EVERY 6 HOURS PRN
Status: DISCONTINUED | OUTPATIENT
Start: 2023-10-25 | End: 2023-10-25

## 2023-10-25 RX ORDER — ACETAMINOPHEN 500 MG
1000 TABLET ORAL EVERY 6 HOURS PRN
Status: DISCONTINUED | OUTPATIENT
Start: 2023-10-25 | End: 2023-10-25

## 2023-10-25 RX ORDER — CLONIDINE HYDROCHLORIDE 0.1 MG/1
0.1 TABLET ORAL 2 TIMES DAILY
Status: DISCONTINUED | OUTPATIENT
Start: 2023-10-25 | End: 2023-10-25

## 2023-10-25 RX ORDER — SEVELAMER CARBONATE 800 MG/1
800 TABLET, FILM COATED ORAL
Status: DISCONTINUED | OUTPATIENT
Start: 2023-10-25 | End: 2023-10-25

## 2023-10-25 RX ORDER — NIFEDIPINE 30 MG/1
60 TABLET, EXTENDED RELEASE ORAL EVERY MORNING
Status: DISCONTINUED | OUTPATIENT
Start: 2023-10-25 | End: 2023-10-25

## 2023-10-25 RX ORDER — ACETAMINOPHEN 500 MG
1000 TABLET ORAL EVERY 6 HOURS PRN
Status: DISCONTINUED | OUTPATIENT
Start: 2023-10-25 | End: 2023-10-25 | Stop reason: DRUGHIGH

## 2023-10-25 RX ORDER — ALBUTEROL SULFATE 90 UG/1
2 AEROSOL, METERED RESPIRATORY (INHALATION) EVERY 6 HOURS PRN
Status: DISCONTINUED | OUTPATIENT
Start: 2023-10-25 | End: 2023-10-25

## 2023-10-25 RX ORDER — ARIPIPRAZOLE 5 MG/1
15 TABLET ORAL DAILY
Status: DISCONTINUED | OUTPATIENT
Start: 2023-10-25 | End: 2023-10-25

## 2023-10-25 RX ORDER — NIFEDIPINE 30 MG/1
30 TABLET, EXTENDED RELEASE ORAL NIGHTLY
Status: DISCONTINUED | OUTPATIENT
Start: 2023-10-25 | End: 2023-10-25

## 2023-10-25 RX ORDER — CARVEDILOL 12.5 MG/1
25 TABLET ORAL 2 TIMES DAILY WITH MEALS
Status: DISCONTINUED | OUTPATIENT
Start: 2023-10-25 | End: 2023-10-25

## 2023-10-25 RX ORDER — FLUOXETINE HYDROCHLORIDE 20 MG/1
40 CAPSULE ORAL DAILY
Status: DISCONTINUED | OUTPATIENT
Start: 2023-10-25 | End: 2023-10-25

## 2023-10-25 NOTE — DISCHARGE INSTRUCTIONS
Therapy Providers that accept [facility]  [address] [place], [address] [place]  [address]   24 [facility] 4250 [facility]    [facility]  [facility] [address] [place], [address] [facility]  [address] [place], [address]  801 [facility]   Kevin tony, Merry Sick, [address], [place], 9 [facility]  [phone]    [facility] in [place] and [address]  [address], [address]  [phone]    [address], [address], [facility], [address] [facility]  [address] -or- [address] [facility]. [facility] [address], [place], [place]  [phone] -or- [phone] -or- [phone]    [address] [facility]  Multiple locations: [address], [address], [address] -or- [facility] -or- 2030 [facility]  [phone] -or- [phone] [facility]  [address] [facility] 100, [place]  [id number]      -----------------------------   Should resume your next dialysis session on 10/26 as per your regular schedule.

## 2023-10-25 NOTE — PROGRESS NOTES
NURSING ADMISSION NOTE      Patient admitted via Cart  Oriented to room. Safety precautions initiated. Bed in low position. Call light in reach. Patient alert and oriented x4. Glasses. 1800 ADA diet, ambulatory. still makes urine. reports loose stools/ Contact plus precaution per protocol- r/o cdiff. POC discussed with patient.

## 2023-10-25 NOTE — PROGRESS NOTES
NURSING DISCHARGE NOTE    Discharged Home via Wheelchair. Accompanied by Support staff  Belongings Taken by patient/family. VSS, tolerating diabetic diet, voiding, pain controlled, tolerating ambulation, dialysis completed. Discharge education provided. Reviewed medications and follow up appts. All questions answered and concerns addressed, pt verbalized understanding. IV removed. Pt dc in stable condition.  Patient left unit via wheelchair at 3888

## 2023-10-25 NOTE — PROGRESS NOTES
[facility]  placed referrals for psychiatry/psychotherapy in-network with pt's insurance in discharge summary.

## 2023-11-10 ENCOUNTER — HOSPITAL ENCOUNTER (OUTPATIENT)
Facility: HOSPITAL | Age: 43
Setting detail: OBSERVATION
Discharge: HOME | End: 2023-11-14
Attending: EMERGENCY MEDICINE | Admitting: HOSPITALIST
Payer: MEDICAID

## 2023-11-10 ENCOUNTER — APPOINTMENT (OUTPATIENT)
Dept: GENERAL RADIOLOGY | Age: 43
End: 2023-11-10
Attending: EMERGENCY MEDICINE
Payer: MEDICAID

## 2023-11-10 DIAGNOSIS — R07.9 ACUTE CHEST PAIN: Primary | ICD-10-CM

## 2023-11-10 DIAGNOSIS — I50.9 ACUTE ON CHRONIC CONGESTIVE HEART FAILURE, UNSPECIFIED HEART FAILURE TYPE (HCC): ICD-10-CM

## 2023-11-10 DIAGNOSIS — R79.89 ELEVATED TROPONIN: ICD-10-CM

## 2023-11-10 DIAGNOSIS — R10.9 ABDOMINAL PAIN, ACUTE: ICD-10-CM

## 2023-11-10 DIAGNOSIS — N18.9 CHRONIC RENAL FAILURE, UNSPECIFIED CKD STAGE: ICD-10-CM

## 2023-11-10 LAB
ALBUMIN SERPL-MCNC: 3.1 G/DL (ref 3.4–5)
ALBUMIN/GLOB SERPL: 0.8 {RATIO} (ref 1–2)
ALP LIVER SERPL-CCNC: 121 U/L
ALT SERPL-CCNC: 36 U/L
ANION GAP SERPL CALC-SCNC: 8 MMOL/L (ref 0–18)
AST SERPL-CCNC: 31 U/L (ref 15–37)
BASOPHILS # BLD AUTO: 0.08 X10(3) UL (ref 0–0.2)
BASOPHILS NFR BLD AUTO: 1.2 %
BILIRUB SERPL-MCNC: 0.5 MG/DL (ref 0.1–2)
BUN BLD-MCNC: 60 MG/DL (ref 9–23)
CALCIUM BLD-MCNC: 7.6 MG/DL (ref 8.5–10.1)
CHLORIDE SERPL-SCNC: 112 MMOL/L (ref 98–112)
CO2 SERPL-SCNC: 20 MMOL/L (ref 21–32)
CREAT BLD-MCNC: 5.22 MG/DL
EGFRCR SERPLBLD CKD-EPI 2021: 13 ML/MIN/1.73M2 (ref 60–?)
EOSINOPHIL # BLD AUTO: 0.23 X10(3) UL (ref 0–0.7)
EOSINOPHIL NFR BLD AUTO: 3.5 %
ERYTHROCYTE [DISTWIDTH] IN BLOOD BY AUTOMATED COUNT: 13.1 %
GLOBULIN PLAS-MCNC: 3.8 G/DL (ref 2.8–4.4)
GLUCOSE BLD-MCNC: 207 MG/DL (ref 70–99)
HCT VFR BLD AUTO: 30.2 %
HGB BLD-MCNC: 10.2 G/DL
IMM GRANULOCYTES # BLD AUTO: 0.01 X10(3) UL (ref 0–1)
IMM GRANULOCYTES NFR BLD: 0.2 %
LIPASE SERPL-CCNC: 127 U/L (ref 13–75)
LYMPHOCYTES # BLD AUTO: 1.29 X10(3) UL (ref 1–4)
LYMPHOCYTES NFR BLD AUTO: 19.8 %
MCH RBC QN AUTO: 30.6 PG (ref 26–34)
MCHC RBC AUTO-ENTMCNC: 33.8 G/DL (ref 31–37)
MCV RBC AUTO: 90.7 FL
MONOCYTES # BLD AUTO: 0.58 X10(3) UL (ref 0.1–1)
MONOCYTES NFR BLD AUTO: 8.9 %
NEUTROPHILS # BLD AUTO: 4.33 X10 (3) UL (ref 1.5–7.7)
NEUTROPHILS # BLD AUTO: 4.33 X10(3) UL (ref 1.5–7.7)
NEUTROPHILS NFR BLD AUTO: 66.4 %
NT-PROBNP SERPL-MCNC: 8243 PG/ML (ref ?–125)
OSMOLALITY SERPL CALC.SUM OF ELEC: 313 MOSM/KG (ref 275–295)
PLATELET # BLD AUTO: 235 10(3)UL (ref 150–450)
POTASSIUM SERPL-SCNC: 3.3 MMOL/L (ref 3.5–5.1)
PROT SERPL-MCNC: 6.9 G/DL (ref 6.4–8.2)
RBC # BLD AUTO: 3.33 X10(6)UL
SODIUM SERPL-SCNC: 140 MMOL/L (ref 136–145)
TROPONIN I SERPL HS-MCNC: 514 NG/L
WBC # BLD AUTO: 6.5 X10(3) UL (ref 4–11)

## 2023-11-10 PROCEDURE — 93010 ELECTROCARDIOGRAM REPORT: CPT

## 2023-11-10 PROCEDURE — 83880 ASSAY OF NATRIURETIC PEPTIDE: CPT | Performed by: EMERGENCY MEDICINE

## 2023-11-10 PROCEDURE — 71045 X-RAY EXAM CHEST 1 VIEW: CPT | Performed by: EMERGENCY MEDICINE

## 2023-11-10 PROCEDURE — 99285 EMERGENCY DEPT VISIT HI MDM: CPT

## 2023-11-10 PROCEDURE — 93005 ELECTROCARDIOGRAM TRACING: CPT

## 2023-11-10 PROCEDURE — 80061 LIPID PANEL: CPT | Performed by: EMERGENCY MEDICINE

## 2023-11-10 PROCEDURE — 85025 COMPLETE CBC W/AUTO DIFF WBC: CPT | Performed by: EMERGENCY MEDICINE

## 2023-11-10 PROCEDURE — 84484 ASSAY OF TROPONIN QUANT: CPT | Performed by: EMERGENCY MEDICINE

## 2023-11-10 PROCEDURE — 96376 TX/PRO/DX INJ SAME DRUG ADON: CPT

## 2023-11-10 PROCEDURE — 96374 THER/PROPH/DIAG INJ IV PUSH: CPT

## 2023-11-10 PROCEDURE — 83690 ASSAY OF LIPASE: CPT | Performed by: EMERGENCY MEDICINE

## 2023-11-10 PROCEDURE — 80053 COMPREHEN METABOLIC PANEL: CPT | Performed by: EMERGENCY MEDICINE

## 2023-11-10 RX ORDER — HYDROMORPHONE HYDROCHLORIDE 1 MG/ML
0.5 INJECTION, SOLUTION INTRAMUSCULAR; INTRAVENOUS; SUBCUTANEOUS EVERY 30 MIN PRN
Status: COMPLETED | OUTPATIENT
Start: 2023-11-10 | End: 2023-11-11

## 2023-11-10 RX ORDER — HYDROMORPHONE HYDROCHLORIDE 1 MG/ML
INJECTION, SOLUTION INTRAMUSCULAR; INTRAVENOUS; SUBCUTANEOUS
Status: COMPLETED
Start: 2023-11-10 | End: 2023-11-10

## 2023-11-10 RX ORDER — NITROGLYCERIN 0.4 MG/1
0.4 TABLET SUBLINGUAL ONCE
Status: COMPLETED | OUTPATIENT
Start: 2023-11-10 | End: 2023-11-10

## 2023-11-11 PROBLEM — N18.9 CHRONIC RENAL FAILURE, UNSPECIFIED CKD STAGE: Status: ACTIVE | Noted: 2023-11-11

## 2023-11-11 PROBLEM — E87.70 FLUID OVERLOAD: Status: ACTIVE | Noted: 2023-11-11

## 2023-11-11 LAB
CHOLEST SERPL-MCNC: 185 MG/DL (ref ?–200)
GLUCOSE BLD-MCNC: 103 MG/DL (ref 70–99)
GLUCOSE BLD-MCNC: 124 MG/DL (ref 70–99)
GLUCOSE BLD-MCNC: 169 MG/DL (ref 70–99)
GLUCOSE BLD-MCNC: 178 MG/DL (ref 70–99)
HDLC SERPL-MCNC: 41 MG/DL (ref 40–59)
LDLC SERPL CALC-MCNC: 106 MG/DL (ref ?–100)
NONHDLC SERPL-MCNC: 144 MG/DL (ref ?–130)
TRIGL SERPL-MCNC: 221 MG/DL (ref 30–149)
TROPONIN I SERPL HS-MCNC: 457 NG/L
VLDLC SERPL CALC-MCNC: 38 MG/DL (ref 0–30)

## 2023-11-11 PROCEDURE — 96376 TX/PRO/DX INJ SAME DRUG ADON: CPT

## 2023-11-11 PROCEDURE — 84484 ASSAY OF TROPONIN QUANT: CPT | Performed by: HOSPITALIST

## 2023-11-11 PROCEDURE — 90935 HEMODIALYSIS ONE EVALUATION: CPT | Performed by: STUDENT IN AN ORGANIZED HEALTH CARE EDUCATION/TRAINING PROGRAM

## 2023-11-11 PROCEDURE — 82962 GLUCOSE BLOOD TEST: CPT

## 2023-11-11 RX ORDER — DEXTROAMPHETAMINE SACCHARATE, AMPHETAMINE ASPARTATE, DEXTROAMPHETAMINE SULFATE AND AMPHETAMINE SULFATE 2.5; 2.5; 2.5; 2.5 MG/1; MG/1; MG/1; MG/1
20 TABLET ORAL 2 TIMES DAILY
Status: DISCONTINUED | OUTPATIENT
Start: 2023-11-11 | End: 2023-11-14

## 2023-11-11 RX ORDER — HEPARIN SODIUM 5000 [USP'U]/ML
5000 INJECTION, SOLUTION INTRAVENOUS; SUBCUTANEOUS EVERY 8 HOURS SCHEDULED
Status: DISCONTINUED | OUTPATIENT
Start: 2023-11-11 | End: 2023-11-14

## 2023-11-11 RX ORDER — NIFEDIPINE 30 MG/1
60 TABLET, EXTENDED RELEASE ORAL EVERY MORNING
Status: DISCONTINUED | OUTPATIENT
Start: 2023-11-11 | End: 2023-11-14

## 2023-11-11 RX ORDER — SENNOSIDES 8.6 MG
17.2 TABLET ORAL NIGHTLY PRN
Status: DISCONTINUED | OUTPATIENT
Start: 2023-11-11 | End: 2023-11-14

## 2023-11-11 RX ORDER — HYDRALAZINE HYDROCHLORIDE 25 MG/1
25 TABLET, FILM COATED ORAL 2 TIMES DAILY
Status: DISCONTINUED | OUTPATIENT
Start: 2023-11-11 | End: 2023-11-14

## 2023-11-11 RX ORDER — LAMOTRIGINE 150 MG/1
150 TABLET ORAL DAILY
Status: DISCONTINUED | OUTPATIENT
Start: 2023-11-11 | End: 2023-11-14

## 2023-11-11 RX ORDER — ISOSORBIDE MONONITRATE 30 MG/1
60 TABLET, EXTENDED RELEASE ORAL DAILY
Status: DISCONTINUED | OUTPATIENT
Start: 2023-11-11 | End: 2023-11-14

## 2023-11-11 RX ORDER — DEXTROSE MONOHYDRATE 25 G/50ML
50 INJECTION, SOLUTION INTRAVENOUS
Status: DISCONTINUED | OUTPATIENT
Start: 2023-11-11 | End: 2023-11-14

## 2023-11-11 RX ORDER — CARVEDILOL 12.5 MG/1
25 TABLET ORAL 2 TIMES DAILY WITH MEALS
Status: DISCONTINUED | OUTPATIENT
Start: 2023-11-11 | End: 2023-11-11

## 2023-11-11 RX ORDER — PROCHLORPERAZINE EDISYLATE 5 MG/ML
5 INJECTION INTRAMUSCULAR; INTRAVENOUS EVERY 8 HOURS PRN
Status: DISCONTINUED | OUTPATIENT
Start: 2023-11-11 | End: 2023-11-14

## 2023-11-11 RX ORDER — ONDANSETRON 2 MG/ML
4 INJECTION INTRAMUSCULAR; INTRAVENOUS EVERY 6 HOURS PRN
Status: DISCONTINUED | OUTPATIENT
Start: 2023-11-11 | End: 2023-11-14

## 2023-11-11 RX ORDER — ACETAMINOPHEN 500 MG
500 TABLET ORAL EVERY 4 HOURS PRN
Status: DISCONTINUED | OUTPATIENT
Start: 2023-11-11 | End: 2023-11-14

## 2023-11-11 RX ORDER — NITROGLYCERIN 0.4 MG/1
0.4 TABLET SUBLINGUAL ONCE
Status: COMPLETED | OUTPATIENT
Start: 2023-11-11 | End: 2023-11-11

## 2023-11-11 RX ORDER — NICOTINE POLACRILEX 4 MG
30 LOZENGE BUCCAL
Status: DISCONTINUED | OUTPATIENT
Start: 2023-11-11 | End: 2023-11-14

## 2023-11-11 RX ORDER — NIFEDIPINE 30 MG/1
30 TABLET, EXTENDED RELEASE ORAL NIGHTLY
Status: DISCONTINUED | OUTPATIENT
Start: 2023-11-11 | End: 2023-11-14

## 2023-11-11 RX ORDER — POLYETHYLENE GLYCOL 3350 17 G/17G
17 POWDER, FOR SOLUTION ORAL DAILY PRN
Status: DISCONTINUED | OUTPATIENT
Start: 2023-11-11 | End: 2023-11-14

## 2023-11-11 RX ORDER — NICOTINE POLACRILEX 4 MG
15 LOZENGE BUCCAL
Status: DISCONTINUED | OUTPATIENT
Start: 2023-11-11 | End: 2023-11-14

## 2023-11-11 RX ORDER — HYDROMORPHONE HYDROCHLORIDE 1 MG/ML
0.8 INJECTION, SOLUTION INTRAMUSCULAR; INTRAVENOUS; SUBCUTANEOUS EVERY 2 HOUR PRN
Status: DISCONTINUED | OUTPATIENT
Start: 2023-11-11 | End: 2023-11-13

## 2023-11-11 RX ORDER — HYDROMORPHONE HYDROCHLORIDE 1 MG/ML
0.4 INJECTION, SOLUTION INTRAMUSCULAR; INTRAVENOUS; SUBCUTANEOUS EVERY 2 HOUR PRN
Status: DISCONTINUED | OUTPATIENT
Start: 2023-11-11 | End: 2023-11-13

## 2023-11-11 RX ORDER — SEVELAMER CARBONATE 800 MG/1
800 TABLET, FILM COATED ORAL
Status: DISCONTINUED | OUTPATIENT
Start: 2023-11-11 | End: 2023-11-14

## 2023-11-11 RX ORDER — FLUOXETINE HYDROCHLORIDE 20 MG/1
40 CAPSULE ORAL DAILY
Status: DISCONTINUED | OUTPATIENT
Start: 2023-11-11 | End: 2023-11-14

## 2023-11-11 RX ORDER — CARVEDILOL 12.5 MG/1
25 TABLET ORAL 2 TIMES DAILY WITH MEALS
Status: DISCONTINUED | OUTPATIENT
Start: 2023-11-11 | End: 2023-11-14

## 2023-11-11 RX ORDER — HYDROMORPHONE HYDROCHLORIDE 1 MG/ML
0.2 INJECTION, SOLUTION INTRAMUSCULAR; INTRAVENOUS; SUBCUTANEOUS EVERY 2 HOUR PRN
Status: DISCONTINUED | OUTPATIENT
Start: 2023-11-11 | End: 2023-11-13

## 2023-11-11 RX ORDER — HEPARIN SODIUM 1000 [USP'U]/ML
1.5 INJECTION, SOLUTION INTRAVENOUS; SUBCUTANEOUS ONCE
Status: COMPLETED | OUTPATIENT
Start: 2023-11-11 | End: 2023-11-11

## 2023-11-11 RX ORDER — HYDROMORPHONE HYDROCHLORIDE 1 MG/ML
0.5 INJECTION, SOLUTION INTRAMUSCULAR; INTRAVENOUS; SUBCUTANEOUS ONCE
Status: DISCONTINUED | OUTPATIENT
Start: 2023-11-11 | End: 2023-11-11

## 2023-11-11 NOTE — PLAN OF CARE
Alert and oriented x4. On RA. Denies any SOB or chest pain at this time. NSR on tele. Elevated b/p's, evening medications given. Pain controlled with prn meds. Continent of bowel and bladder. Up SBA.      Nephrology on call Dr. An Llamas notified for Dialysis in the a.m: awaiting response     Hospitalist Dr. Roma Bolanos notified of high bp's

## 2023-11-11 NOTE — ED QUICK NOTES
Orders for admission, patient is aware of plan and ready to go upstairs. Any questions, please call ED RN Hermann Echeverria at extension [phone].      Patient Covid vaccination status: Fully vaccinated     COVID Test Ordered in ED: None    COVID Suspicion at Admission: N/A    Running Infusions:  None    Mental Status/LOC at time of transport: AAOx4    Other pertinent information:   CIWA score: N/A   NIH score:  N/A

## 2023-11-11 NOTE — PLAN OF CARE
Assumed care at 0730. Pt is A&Ox4. Pt is on RA, O2 sats WNL. NSR on tele, VSS. Continent of B&B. C/o pain in abdomen relieved w/ PRN medications. Up independently, tolerating well. Plan of care reviewed with patient, verbalizes understanding, all needs addressed at this time, pt seems to be resting comfortably. Call light within reach. POC: HD ordered today, pain control    Problem: CARDIOVASCULAR - ADULT  Goal: Maintains optimal cardiac output and hemodynamic stability  Description: INTERVENTIONS:  - Monitor vital signs, rhythm, and trends  - Monitor for bleeding, hypotension and signs of decreased cardiac output  - Evaluate effectiveness of vasoactive medications to optimize hemodynamic stability  - Monitor arterial and/or venous puncture sites for bleeding and/or hematoma  - Assess quality of pulses, skin color and temperature  - Assess for signs of decreased coronary artery perfusion - ex.  Angina  - Evaluate fluid balance, assess for edema, trend weights  Outcome: Progressing  Goal: Absence of cardiac arrhythmias or at baseline  Description: INTERVENTIONS:  - Continuous cardiac monitoring, monitor vital signs, obtain 12 lead EKG if indicated  - Evaluate effectiveness of antiarrhythmic and heart rate control medications as ordered  - Initiate emergency measures for life threatening arrhythmias  - Monitor electrolytes and administer replacement therapy as ordered  Outcome: Progressing     Problem: METABOLIC/FLUID AND ELECTROLYTES - ADULT  Goal: Glucose maintained within prescribed range  Description: INTERVENTIONS:  - Monitor Blood Glucose as ordered  - Assess for signs and symptoms of hyperglycemia and hypoglycemia  - Administer ordered medications to maintain glucose within target range  - Assess barriers to adequate nutritional intake and initiate nutrition consult as needed  - Instruct patient on self management of diabetes  Outcome: Progressing  Goal: Electrolytes maintained within normal limits  Description: INTERVENTIONS:  - Monitor labs and rhythm and assess patient for signs and symptoms of electrolyte imbalances  - Administer electrolyte replacement as ordered  - Monitor response to electrolyte replacements, including rhythm and repeat lab results as appropriate  - Fluid restriction as ordered  - Instruct patient on fluid and nutrition restrictions as appropriate  Outcome: Progressing  Goal: Hemodynamic stability and optimal renal function maintained  Description: INTERVENTIONS:  - Monitor labs and assess for signs and symptoms of volume excess or deficit  - Monitor intake, output and patient weight  - Monitor urine specific gravity, serum osmolarity and serum sodium as indicated or ordered  - Monitor response to interventions for patient's volume status, including labs, urine output, blood pressure (other measures as available)  - Encourage oral intake as appropriate  - Instruct patient on fluid and nutrition restrictions as appropriate  Outcome: Progressing     Problem: HEMATOLOGIC - ADULT  Goal: Free from bleeding injury  Description: (Example usage: patient with low platelets)  INTERVENTIONS:  - Avoid intramuscular injections, enemas and rectal medication administration  - Ensure safe mobilization of patient  - Hold pressure on venipuncture sites to achieve adequate hemostasis  - Assess for signs and symptoms of internal bleeding  - Monitor lab trends  - Patient is to report abnormal signs of bleeding to staff  - Avoid use of toothpicks and dental floss  - Use electric shaver for shaving  - Use soft bristle tooth brush  - Limit straining and forceful nose blowing  Outcome: Progressing

## 2023-11-12 LAB
ALBUMIN SERPL-MCNC: 3.1 G/DL (ref 3.4–5)
ALBUMIN/GLOB SERPL: 0.8 {RATIO} (ref 1–2)
ALP LIVER SERPL-CCNC: 120 U/L
ALT SERPL-CCNC: 25 U/L
ANION GAP SERPL CALC-SCNC: 7 MMOL/L (ref 0–18)
AST SERPL-CCNC: 17 U/L (ref 15–37)
ATRIAL RATE: 90 BPM
BASOPHILS # BLD AUTO: 0.07 X10(3) UL (ref 0–0.2)
BASOPHILS NFR BLD AUTO: 1.2 %
BILIRUB SERPL-MCNC: 0.4 MG/DL (ref 0.1–2)
BUN BLD-MCNC: 27 MG/DL (ref 9–23)
CALCIUM BLD-MCNC: 8.2 MG/DL (ref 8.5–10.1)
CHLORIDE SERPL-SCNC: 107 MMOL/L (ref 98–112)
CO2 SERPL-SCNC: 26 MMOL/L (ref 21–32)
CREAT BLD-MCNC: 3.16 MG/DL
EGFRCR SERPLBLD CKD-EPI 2021: 24 ML/MIN/1.73M2 (ref 60–?)
EOSINOPHIL # BLD AUTO: 0.46 X10(3) UL (ref 0–0.7)
EOSINOPHIL NFR BLD AUTO: 8 %
ERYTHROCYTE [DISTWIDTH] IN BLOOD BY AUTOMATED COUNT: 12.8 %
GLOBULIN PLAS-MCNC: 3.9 G/DL (ref 2.8–4.4)
GLUCOSE BLD-MCNC: 117 MG/DL (ref 70–99)
GLUCOSE BLD-MCNC: 123 MG/DL (ref 70–99)
GLUCOSE BLD-MCNC: 145 MG/DL (ref 70–99)
GLUCOSE BLD-MCNC: 184 MG/DL (ref 70–99)
GLUCOSE BLD-MCNC: 226 MG/DL (ref 70–99)
HCT VFR BLD AUTO: 31.3 %
HGB BLD-MCNC: 10.5 G/DL
IMM GRANULOCYTES # BLD AUTO: 0.01 X10(3) UL (ref 0–1)
IMM GRANULOCYTES NFR BLD: 0.2 %
LYMPHOCYTES # BLD AUTO: 1.58 X10(3) UL (ref 1–4)
LYMPHOCYTES NFR BLD AUTO: 27.4 %
MAGNESIUM SERPL-MCNC: 2.2 MG/DL (ref 1.6–2.6)
MCH RBC QN AUTO: 30.5 PG (ref 26–34)
MCHC RBC AUTO-ENTMCNC: 33.5 G/DL (ref 31–37)
MCV RBC AUTO: 91 FL
MONOCYTES # BLD AUTO: 0.64 X10(3) UL (ref 0.1–1)
MONOCYTES NFR BLD AUTO: 11.1 %
NEUTROPHILS # BLD AUTO: 3.01 X10 (3) UL (ref 1.5–7.7)
NEUTROPHILS # BLD AUTO: 3.01 X10(3) UL (ref 1.5–7.7)
NEUTROPHILS NFR BLD AUTO: 52.1 %
OSMOLALITY SERPL CALC.SUM OF ELEC: 296 MOSM/KG (ref 275–295)
P AXIS: 26 DEGREES
P-R INTERVAL: 192 MS
PHOSPHATE SERPL-MCNC: 2.3 MG/DL (ref 2.5–4.9)
PLATELET # BLD AUTO: 224 10(3)UL (ref 150–450)
POTASSIUM SERPL-SCNC: 3.1 MMOL/L (ref 3.5–5.1)
PROT SERPL-MCNC: 7 G/DL (ref 6.4–8.2)
Q-T INTERVAL: 410 MS
QRS DURATION: 88 MS
QTC CALCULATION (BEZET): 501 MS
R AXIS: 13 DEGREES
RBC # BLD AUTO: 3.44 X10(6)UL
SODIUM SERPL-SCNC: 140 MMOL/L (ref 136–145)
T AXIS: 115 DEGREES
TROPONIN I SERPL HS-MCNC: 323 NG/L
VENTRICULAR RATE: 90 BPM
WBC # BLD AUTO: 5.8 X10(3) UL (ref 4–11)

## 2023-11-12 PROCEDURE — 84484 ASSAY OF TROPONIN QUANT: CPT | Performed by: INTERNAL MEDICINE

## 2023-11-12 PROCEDURE — 85025 COMPLETE CBC W/AUTO DIFF WBC: CPT | Performed by: HOSPITALIST

## 2023-11-12 PROCEDURE — 82962 GLUCOSE BLOOD TEST: CPT

## 2023-11-12 PROCEDURE — 84100 ASSAY OF PHOSPHORUS: CPT | Performed by: STUDENT IN AN ORGANIZED HEALTH CARE EDUCATION/TRAINING PROGRAM

## 2023-11-12 PROCEDURE — 80053 COMPREHEN METABOLIC PANEL: CPT | Performed by: HOSPITALIST

## 2023-11-12 PROCEDURE — 83735 ASSAY OF MAGNESIUM: CPT | Performed by: HOSPITALIST

## 2023-11-12 PROCEDURE — 90935 HEMODIALYSIS ONE EVALUATION: CPT | Performed by: STUDENT IN AN ORGANIZED HEALTH CARE EDUCATION/TRAINING PROGRAM

## 2023-11-12 RX ORDER — HEPARIN SODIUM 1000 [USP'U]/ML
4000 INJECTION, SOLUTION INTRAVENOUS; SUBCUTANEOUS
Status: DISCONTINUED | OUTPATIENT
Start: 2023-11-12 | End: 2023-11-14

## 2023-11-12 NOTE — PLAN OF CARE
Assumed care of pt around 1930  -Dialysis tonight 1.4 L out  AxO x4, R/A, up independently  NSR, no cardiac symptoms  Pt endorses abd pain/ cramping, managing with prn IV diluadid  Continent of bladder and bowel  Fall precautions in place  Pt updated on plan of care, all needs met at this time          Problem: CARDIOVASCULAR - ADULT  Goal: Maintains optimal cardiac output and hemodynamic stability  Description: INTERVENTIONS:  - Monitor vital signs, rhythm, and trends  - Monitor for bleeding, hypotension and signs of decreased cardiac output  - Evaluate effectiveness of vasoactive medications to optimize hemodynamic stability  - Monitor arterial and/or venous puncture sites for bleeding and/or hematoma  - Assess quality of pulses, skin color and temperature  - Assess for signs of decreased coronary artery perfusion - ex.  Angina  - Evaluate fluid balance, assess for edema, trend weights  Outcome: Progressing  Goal: Absence of cardiac arrhythmias or at baseline  Description: INTERVENTIONS:  - Continuous cardiac monitoring, monitor vital signs, obtain 12 lead EKG if indicated  - Evaluate effectiveness of antiarrhythmic and heart rate control medications as ordered  - Initiate emergency measures for life threatening arrhythmias  - Monitor electrolytes and administer replacement therapy as ordered  Outcome: Progressing     Problem: METABOLIC/FLUID AND ELECTROLYTES - ADULT  Goal: Glucose maintained within prescribed range  Description: INTERVENTIONS:  - Monitor Blood Glucose as ordered  - Assess for signs and symptoms of hyperglycemia and hypoglycemia  - Administer ordered medications to maintain glucose within target range  - Assess barriers to adequate nutritional intake and initiate nutrition consult as needed  - Instruct patient on self management of diabetes  Outcome: Progressing  Goal: Electrolytes maintained within normal limits  Description: INTERVENTIONS:  - Monitor labs and rhythm and assess patient for signs and symptoms of electrolyte imbalances  - Administer electrolyte replacement as ordered  - Monitor response to electrolyte replacements, including rhythm and repeat lab results as appropriate  - Fluid restriction as ordered  - Instruct patient on fluid and nutrition restrictions as appropriate  Outcome: Progressing  Goal: Hemodynamic stability and optimal renal function maintained  Description: INTERVENTIONS:  - Monitor labs and assess for signs and symptoms of volume excess or deficit  - Monitor intake, output and patient weight  - Monitor urine specific gravity, serum osmolarity and serum sodium as indicated or ordered  - Monitor response to interventions for patient's volume status, including labs, urine output, blood pressure (other measures as available)  - Encourage oral intake as appropriate  - Instruct patient on fluid and nutrition restrictions as appropriate  Outcome: Progressing     Problem: HEMATOLOGIC - ADULT  Goal: Free from bleeding injury  Description: (Example usage: patient with low platelets)  INTERVENTIONS:  - Avoid intramuscular injections, enemas and rectal medication administration  - Ensure safe mobilization of patient  - Hold pressure on venipuncture sites to achieve adequate hemostasis  - Assess for signs and symptoms of internal bleeding  - Monitor lab trends  - Patient is to report abnormal signs of bleeding to staff  - Avoid use of toothpicks and dental floss  - Use electric shaver for shaving  - Use soft bristle tooth brush  - Limit straining and forceful nose blowing  Outcome: Progressing

## 2023-11-13 LAB
ALBUMIN SERPL-MCNC: 3.2 G/DL (ref 3.4–5)
ANION GAP SERPL CALC-SCNC: 9 MMOL/L (ref 0–18)
BUN BLD-MCNC: 29 MG/DL (ref 9–23)
CALCIUM BLD-MCNC: 8.4 MG/DL (ref 8.5–10.1)
CHLORIDE SERPL-SCNC: 102 MMOL/L (ref 98–112)
CO2 SERPL-SCNC: 27 MMOL/L (ref 21–32)
CREAT BLD-MCNC: 4.12 MG/DL
EGFRCR SERPLBLD CKD-EPI 2021: 17 ML/MIN/1.73M2 (ref 60–?)
GLUCOSE BLD-MCNC: 107 MG/DL (ref 70–99)
GLUCOSE BLD-MCNC: 114 MG/DL (ref 70–99)
GLUCOSE BLD-MCNC: 147 MG/DL (ref 70–99)
GLUCOSE BLD-MCNC: 177 MG/DL (ref 70–99)
GLUCOSE BLD-MCNC: 181 MG/DL (ref 70–99)
OSMOLALITY SERPL CALC.SUM OF ELEC: 296 MOSM/KG (ref 275–295)
PHOSPHATE SERPL-MCNC: 3.6 MG/DL (ref 2.5–4.9)
POTASSIUM SERPL-SCNC: 3.5 MMOL/L (ref 3.5–5.1)
SODIUM SERPL-SCNC: 138 MMOL/L (ref 136–145)

## 2023-11-13 PROCEDURE — 82962 GLUCOSE BLOOD TEST: CPT

## 2023-11-13 PROCEDURE — 80069 RENAL FUNCTION PANEL: CPT | Performed by: INTERNAL MEDICINE

## 2023-11-13 RX ORDER — HYDROCODONE BITARTRATE AND ACETAMINOPHEN 5; 325 MG/1; MG/1
1 TABLET ORAL EVERY 6 HOURS PRN
Status: DISCONTINUED | OUTPATIENT
Start: 2023-11-13 | End: 2023-11-14

## 2023-11-13 RX ORDER — HEPARIN SODIUM 1000 [USP'U]/ML
1.5 INJECTION, SOLUTION INTRAVENOUS; SUBCUTANEOUS ONCE
Status: COMPLETED | OUTPATIENT
Start: 2023-11-14 | End: 2023-11-14

## 2023-11-13 NOTE — PLAN OF CARE
Pt is received at 0700. A&O x 4. Complains about abdominal pain and received a pain medication. Lungs are clear diminished, RA. NSR on tele. Last BM 11/11, bowel sounds are active x 4, abdomen is soft and tender, continent of B&B. All needs are met. Pt is updated with plan of care. Problem: CARDIOVASCULAR - ADULT  Goal: Maintains optimal cardiac output and hemodynamic stability  Description: INTERVENTIONS:  - Monitor vital signs, rhythm, and trends  - Monitor for bleeding, hypotension and signs of decreased cardiac output  - Evaluate effectiveness of vasoactive medications to optimize hemodynamic stability  - Monitor arterial and/or venous puncture sites for bleeding and/or hematoma  - Assess quality of pulses, skin color and temperature  - Assess for signs of decreased coronary artery perfusion - ex.  Angina  - Evaluate fluid balance, assess for edema, trend weights  Outcome: Progressing  Goal: Absence of cardiac arrhythmias or at baseline  Description: INTERVENTIONS:  - Continuous cardiac monitoring, monitor vital signs, obtain 12 lead EKG if indicated  - Evaluate effectiveness of antiarrhythmic and heart rate control medications as ordered  - Initiate emergency measures for life threatening arrhythmias  - Monitor electrolytes and administer replacement therapy as ordered  Outcome: Progressing     Problem: METABOLIC/FLUID AND ELECTROLYTES - ADULT  Goal: Glucose maintained within prescribed range  Description: INTERVENTIONS:  - Monitor Blood Glucose as ordered  - Assess for signs and symptoms of hyperglycemia and hypoglycemia  - Administer ordered medications to maintain glucose within target range  - Assess barriers to adequate nutritional intake and initiate nutrition consult as needed  - Instruct patient on self management of diabetes  Outcome: Progressing  Goal: Electrolytes maintained within normal limits  Description: INTERVENTIONS:  - Monitor labs and rhythm and assess patient for signs and symptoms of electrolyte imbalances  - Administer electrolyte replacement as ordered  - Monitor response to electrolyte replacements, including rhythm and repeat lab results as appropriate  - Fluid restriction as ordered  - Instruct patient on fluid and nutrition restrictions as appropriate  Outcome: Progressing  Goal: Hemodynamic stability and optimal renal function maintained  Description: INTERVENTIONS:  - Monitor labs and assess for signs and symptoms of volume excess or deficit  - Monitor intake, output and patient weight  - Monitor urine specific gravity, serum osmolarity and serum sodium as indicated or ordered  - Monitor response to interventions for patient's volume status, including labs, urine output, blood pressure (other measures as available)  - Encourage oral intake as appropriate  - Instruct patient on fluid and nutrition restrictions as appropriate  Outcome: Progressing     Problem: HEMATOLOGIC - ADULT  Goal: Free from bleeding injury  Description: (Example usage: patient with low platelets)  INTERVENTIONS:  - Avoid intramuscular injections, enemas and rectal medication administration  - Ensure safe mobilization of patient  - Hold pressure on venipuncture sites to achieve adequate hemostasis  - Assess for signs and symptoms of internal bleeding  - Monitor lab trends  - Patient is to report abnormal signs of bleeding to staff  - Avoid use of toothpicks and dental floss  - Use electric shaver for shaving  - Use soft bristle tooth brush  - Limit straining and forceful nose blowing  Outcome: Progressing     Problem: Patient/Family Goals  Goal: Patient/Family Long Term Goal  Description: Patient's Long Term Goal: Stay out of the hospital    Interventions:  - appropriate consults, taking prescribed meds  -follow up with PCP  - See additional Care Plan goals for specific interventions  Outcome: Progressing  Goal: Patient/Family Short Term Goal  Description: Patient's Short Term Goal: Pain management    Interventions: - appropriate consults  -Pain medication  -Hourly rounding  -Assessment  -Hot/cold packs  - See additional Care Plan goals for specific interventions  Outcome: Progressing

## 2023-11-13 NOTE — PLAN OF CARE
Assumed care of patient at 1. Pt A/Ox 4. O2 sats maintained on room air. NSR on tele. Last BM 11/11. Voiding without difficulty. Pt reports abdominal pain, managed with PRN pain meds. Pt up independently, instructed to call with any changes. Pt updated on plan of care. Care needs met. Bed in lowest position, Call light within reach. Pt declining bed alarm for the night. POC: HD, pain control     Problem: CARDIOVASCULAR - ADULT  Goal: Maintains optimal cardiac output and hemodynamic stability  Description: INTERVENTIONS:  - Monitor vital signs, rhythm, and trends  - Monitor for bleeding, hypotension and signs of decreased cardiac output  - Evaluate effectiveness of vasoactive medications to optimize hemodynamic stability  - Monitor arterial and/or venous puncture sites for bleeding and/or hematoma  - Assess quality of pulses, skin color and temperature  - Assess for signs of decreased coronary artery perfusion - ex.  Angina  - Evaluate fluid balance, assess for edema, trend weights  Outcome: Progressing  Goal: Absence of cardiac arrhythmias or at baseline  Description: INTERVENTIONS:  - Continuous cardiac monitoring, monitor vital signs, obtain 12 lead EKG if indicated  - Evaluate effectiveness of antiarrhythmic and heart rate control medications as ordered  - Initiate emergency measures for life threatening arrhythmias  - Monitor electrolytes and administer replacement therapy as ordered  Outcome: Progressing     Problem: METABOLIC/FLUID AND ELECTROLYTES - ADULT  Goal: Glucose maintained within prescribed range  Description: INTERVENTIONS:  - Monitor Blood Glucose as ordered  - Assess for signs and symptoms of hyperglycemia and hypoglycemia  - Administer ordered medications to maintain glucose within target range  - Assess barriers to adequate nutritional intake and initiate nutrition consult as needed  - Instruct patient on self management of diabetes  Outcome: Progressing  Goal: Electrolytes maintained within normal limits  Description: INTERVENTIONS:  - Monitor labs and rhythm and assess patient for signs and symptoms of electrolyte imbalances  - Administer electrolyte replacement as ordered  - Monitor response to electrolyte replacements, including rhythm and repeat lab results as appropriate  - Fluid restriction as ordered  - Instruct patient on fluid and nutrition restrictions as appropriate  Outcome: Progressing  Goal: Hemodynamic stability and optimal renal function maintained  Description: INTERVENTIONS:  - Monitor labs and assess for signs and symptoms of volume excess or deficit  - Monitor intake, output and patient weight  - Monitor urine specific gravity, serum osmolarity and serum sodium as indicated or ordered  - Monitor response to interventions for patient's volume status, including labs, urine output, blood pressure (other measures as available)  - Encourage oral intake as appropriate  - Instruct patient on fluid and nutrition restrictions as appropriate  Outcome: Progressing     Problem: HEMATOLOGIC - ADULT  Goal: Free from bleeding injury  Description: (Example usage: patient with low platelets)  INTERVENTIONS:  - Avoid intramuscular injections, enemas and rectal medication administration  - Ensure safe mobilization of patient  - Hold pressure on venipuncture sites to achieve adequate hemostasis  - Assess for signs and symptoms of internal bleeding  - Monitor lab trends  - Patient is to report abnormal signs of bleeding to staff  - Avoid use of toothpicks and dental floss  - Use electric shaver for shaving  - Use soft bristle tooth brush  - Limit straining and forceful nose blowing  Outcome: Progressing     Problem: Patient/Family Goals  Goal: Patient/Family Long Term Goal  Description: Patient's Long Term Goal: discharge home    Interventions:  - appropriate consults, taking prescribed meds  - See additional Care Plan goals for specific interventions  Outcome: Progressing  Goal: Patient/Family Short Term Goal  Description: Patient's Short Term Goal: feel better    Interventions:   - appropriate consults, pain control, HD  - See additional Care Plan goals for specific interventions  Outcome: Progressing

## 2023-11-14 VITALS
HEIGHT: 74 IN | RESPIRATION RATE: 19 BRPM | DIASTOLIC BLOOD PRESSURE: 96 MMHG | OXYGEN SATURATION: 94 % | TEMPERATURE: 98 F | SYSTOLIC BLOOD PRESSURE: 135 MMHG | BODY MASS INDEX: 33.09 KG/M2 | WEIGHT: 257.81 LBS | HEART RATE: 81 BPM

## 2023-11-14 LAB
ALBUMIN SERPL-MCNC: 3.1 G/DL (ref 3.4–5)
ANION GAP SERPL CALC-SCNC: 9 MMOL/L (ref 0–18)
BUN BLD-MCNC: 45 MG/DL (ref 9–23)
CALCIUM BLD-MCNC: 9.2 MG/DL (ref 8.5–10.1)
CHLORIDE SERPL-SCNC: 107 MMOL/L (ref 98–112)
CO2 SERPL-SCNC: 19 MMOL/L (ref 21–32)
CREAT BLD-MCNC: 5.51 MG/DL
EGFRCR SERPLBLD CKD-EPI 2021: 12 ML/MIN/1.73M2 (ref 60–?)
GLUCOSE BLD-MCNC: 108 MG/DL (ref 70–99)
GLUCOSE BLD-MCNC: 117 MG/DL (ref 70–99)
GLUCOSE BLD-MCNC: 120 MG/DL (ref 70–99)
OSMOLALITY SERPL CALC.SUM OF ELEC: 293 MOSM/KG (ref 275–295)
PHOSPHATE SERPL-MCNC: 3.5 MG/DL (ref 2.5–4.9)
POTASSIUM SERPL-SCNC: 4.3 MMOL/L (ref 3.5–5.1)
SODIUM SERPL-SCNC: 135 MMOL/L (ref 136–145)

## 2023-11-14 PROCEDURE — 82962 GLUCOSE BLOOD TEST: CPT

## 2023-11-14 PROCEDURE — 90935 HEMODIALYSIS ONE EVALUATION: CPT | Performed by: INTERNAL MEDICINE

## 2023-11-14 PROCEDURE — 80069 RENAL FUNCTION PANEL: CPT | Performed by: INTERNAL MEDICINE

## 2023-11-14 RX ORDER — LISDEXAMFETAMINE DIMESYLATE CAPSULES 60 MG/1
60 CAPSULE ORAL EVERY MORNING
Status: ON HOLD | COMMUNITY
End: 2023-11-14

## 2023-11-14 RX ORDER — LAMOTRIGINE 150 MG/1
150 TABLET ORAL DAILY
Qty: 7 TABLET | Refills: 0 | Status: SHIPPED | OUTPATIENT
Start: 2023-11-14

## 2023-11-14 RX ORDER — LISDEXAMFETAMINE DIMESYLATE CAPSULES 60 MG/1
60 CAPSULE ORAL EVERY MORNING
Qty: 7 CAPSULE | Refills: 0 | Status: SHIPPED | OUTPATIENT
Start: 2023-11-14 | End: 2023-11-21

## 2023-11-14 RX ORDER — FLUOXETINE HYDROCHLORIDE 40 MG/1
40 CAPSULE ORAL DAILY
Qty: 7 CAPSULE | Refills: 0 | Status: SHIPPED | OUTPATIENT
Start: 2023-11-14

## 2023-11-14 RX ORDER — ARIPIPRAZOLE 5 MG/1
15 TABLET ORAL DAILY
Qty: 21 TABLET | Refills: 0 | Status: SHIPPED | OUTPATIENT
Start: 2023-11-14 | End: 2023-11-21

## 2023-11-14 NOTE — PROGRESS NOTES
Received report from JASON Olsen, did bedside report, answered patient's questions, updated plan of care. Bed in lower position, call light within reach. Will endorse plan of care to next shift.

## 2023-11-14 NOTE — PLAN OF CARE
Report received from Radha [facility]. Assumed care of this patient at 0730 AM. Patient is sleeping, but arouses easily. Patient is alert an oriented times four. Lungs with crackles half way down bilaterally. He is sinus rhythm on monitor. Patient still c/o abdominal pain: norco given. Plan for dialysis today. Imdur and procardia held as patient states that his blood pressure drops with dialysis.

## 2023-11-14 NOTE — PROGRESS NOTES
[address] [facility]  on consult due to PHQ-4 score. Upon evaluation, patient reported anxiety/depression directly related to medical concerns. Patient reports no suicidal ideation or thoughts of harming self. Patient reported working with psychiatry provider Stephanie Leyva, though is unable to get in for an appointment until the 21st of Nov. Writer messaged hospitalist to see if he would be able to prescribe patient's psychotropic medications until his apt with psych. No safety concerns at this time.

## 2023-11-14 NOTE — PLAN OF CARE
Assumed care of patient at 1. Pt A/Ox 4. O2 sats maintained on room air. NSR on tele. Last BM 11/11. Voiding without difficulty. Pt reports abdominal pain, managed with PRN pain meds. Pt up independently, instructed to call with any changes. Pt updated on plan of care. Care needs met. Bed in lowest position, Call light within reach. Pt declining bed alarm for the night. POC: HD, pain control     Problem: CARDIOVASCULAR - ADULT  Goal: Maintains optimal cardiac output and hemodynamic stability  Description: INTERVENTIONS:  - Monitor vital signs, rhythm, and trends  - Monitor for bleeding, hypotension and signs of decreased cardiac output  - Evaluate effectiveness of vasoactive medications to optimize hemodynamic stability  - Monitor arterial and/or venous puncture sites for bleeding and/or hematoma  - Assess quality of pulses, skin color and temperature  - Assess for signs of decreased coronary artery perfusion - ex.  Angina  - Evaluate fluid balance, assess for edema, trend weights  Outcome: Progressing  Goal: Absence of cardiac arrhythmias or at baseline  Description: INTERVENTIONS:  - Continuous cardiac monitoring, monitor vital signs, obtain 12 lead EKG if indicated  - Evaluate effectiveness of antiarrhythmic and heart rate control medications as ordered  - Initiate emergency measures for life threatening arrhythmias  - Monitor electrolytes and administer replacement therapy as ordered  Outcome: Progressing     Problem: METABOLIC/FLUID AND ELECTROLYTES - ADULT  Goal: Glucose maintained within prescribed range  Description: INTERVENTIONS:  - Monitor Blood Glucose as ordered  - Assess for signs and symptoms of hyperglycemia and hypoglycemia  - Administer ordered medications to maintain glucose within target range  - Assess barriers to adequate nutritional intake and initiate nutrition consult as needed  - Instruct patient on self management of diabetes  Outcome: Progressing  Goal: Electrolytes maintained within normal limits  Description: INTERVENTIONS:  - Monitor labs and rhythm and assess patient for signs and symptoms of electrolyte imbalances  - Administer electrolyte replacement as ordered  - Monitor response to electrolyte replacements, including rhythm and repeat lab results as appropriate  - Fluid restriction as ordered  - Instruct patient on fluid and nutrition restrictions as appropriate  Outcome: Progressing  Goal: Hemodynamic stability and optimal renal function maintained  Description: INTERVENTIONS:  - Monitor labs and assess for signs and symptoms of volume excess or deficit  - Monitor intake, output and patient weight  - Monitor urine specific gravity, serum osmolarity and serum sodium as indicated or ordered  - Monitor response to interventions for patient's volume status, including labs, urine output, blood pressure (other measures as available)  - Encourage oral intake as appropriate  - Instruct patient on fluid and nutrition restrictions as appropriate  Outcome: Progressing     Problem: HEMATOLOGIC - ADULT  Goal: Free from bleeding injury  Description: (Example usage: patient with low platelets)  INTERVENTIONS:  - Avoid intramuscular injections, enemas and rectal medication administration  - Ensure safe mobilization of patient  - Hold pressure on venipuncture sites to achieve adequate hemostasis  - Assess for signs and symptoms of internal bleeding  - Monitor lab trends  - Patient is to report abnormal signs of bleeding to staff  - Avoid use of toothpicks and dental floss  - Use electric shaver for shaving  - Use soft bristle tooth brush  - Limit straining and forceful nose blowing  Outcome: Progressing     Problem: Patient/Family Goals  Goal: Patient/Family Long Term Goal  Description: Patient's Long Term Goal: Stay out of the hospital    Interventions:  - appropriate consults, taking prescribed meds  -follow up with PCP  - See additional Care Plan goals for specific interventions  Outcome: Progressing  Goal: Patient/Family Short Term Goal  Description: Patient's Short Term Goal: Pain management    Interventions:   - appropriate consults  -Pain medication  -Hourly rounding  -Assessment  -Hot/cold packs  - See additional Care Plan goals for specific interventions  Outcome: Progressing

## 2023-11-15 NOTE — PLAN OF CARE
Problem: METABOLIC/FLUID AND ELECTROLYTES - ADULT  Goal: Glucose maintained within prescribed range  Description: INTERVENTIONS:  - Monitor Blood Glucose as ordered  - Assess for signs and symptoms of hyperglycemia and hypoglycemia  - Administer ordered medications to maintain glucose within target range  - Assess barriers to adequate nutritional intake and initiate nutrition consult as needed  - Instruct patient on self management of diabetes  Outcome: Progressing  Goal: Electrolytes maintained within normal limits  Description: INTERVENTIONS:  - Monitor labs and rhythm and assess patient for signs and symptoms of electrolyte imbalances  - Administer electrolyte replacement as ordered  - Monitor response to electrolyte replacements, including rhythm and repeat lab results as appropriate  - Fluid restriction as ordered  - Instruct patient on fluid and nutrition restrictions as appropriate  Outcome: Progressing  Goal: Hemodynamic stability and optimal renal function maintained  Description: INTERVENTIONS:  - Monitor labs and assess for signs and symptoms of volume excess or deficit  - Monitor intake, output and patient weight  - Monitor urine specific gravity, serum osmolarity and serum sodium as indicated or ordered  - Monitor response to interventions for patient's volume status, including labs, urine output, blood pressure (other measures as available)  - Encourage oral intake as appropriate  - Instruct patient on fluid and nutrition restrictions as appropriate  Outcome: Progressing     Problem: CARDIOVASCULAR - ADULT  Goal: Maintains optimal cardiac output and hemodynamic stability  Description: INTERVENTIONS:  - Monitor vital signs, rhythm, and trends  - Monitor for bleeding, hypotension and signs of decreased cardiac output  - Evaluate effectiveness of vasoactive medications to optimize hemodynamic stability  - Monitor arterial and/or venous puncture sites for bleeding and/or hematoma  - Assess quality of pulses, skin color and temperature  - Assess for signs of decreased coronary artery perfusion - ex. Angina  - Evaluate fluid balance, assess for edema, trend weights  Outcome: Progressing  Goal: Absence of cardiac arrhythmias or at baseline  Description: INTERVENTIONS:  - Continuous cardiac monitoring, monitor vital signs, obtain 12 lead EKG if indicated  - Evaluate effectiveness of antiarrhythmic and heart rate control medications as ordered  - Initiate emergency measures for life threatening arrhythmias  - Monitor electrolytes and administer replacement therapy as ordered  Outcome: Progressing  Pt discharged home as ordered. Dc instructions provided to pt, pt verbalized understanding of instructions. Tele monitor off and returned. Iv dc'd prior to dc home. Pt to [facility] via wheelchair, for [employer] home accompanied by [place].

## 2023-11-23 ENCOUNTER — HOSPITAL ENCOUNTER (INPATIENT)
Facility: HOSPITAL | Age: 43
LOS: 2 days | Discharge: HOME | End: 2023-11-25
Attending: EMERGENCY MEDICINE | Admitting: HOSPITALIST
Payer: MEDICARE

## 2023-11-23 ENCOUNTER — APPOINTMENT (OUTPATIENT)
Dept: GENERAL RADIOLOGY | Age: 43
End: 2023-11-23
Attending: EMERGENCY MEDICINE
Payer: MEDICARE

## 2023-11-23 ENCOUNTER — APPOINTMENT (OUTPATIENT)
Dept: CT IMAGING | Age: 43
End: 2023-11-23
Attending: EMERGENCY MEDICINE
Payer: MEDICARE

## 2023-11-23 DIAGNOSIS — N18.6 ESRD NEEDING DIALYSIS (HCC): ICD-10-CM

## 2023-11-23 DIAGNOSIS — R10.9 ABDOMINAL PAIN, ACUTE: ICD-10-CM

## 2023-11-23 DIAGNOSIS — R79.89 ELEVATED TROPONIN: ICD-10-CM

## 2023-11-23 DIAGNOSIS — I16.1 HYPERTENSIVE EMERGENCY: Primary | ICD-10-CM

## 2023-11-23 DIAGNOSIS — Z99.2 ESRD NEEDING DIALYSIS (HCC): ICD-10-CM

## 2023-11-23 LAB
ALBUMIN SERPL-MCNC: 3.1 G/DL (ref 3.4–5)
ALBUMIN/GLOB SERPL: 0.8 {RATIO} (ref 1–2)
ALP LIVER SERPL-CCNC: 135 U/L
ALT SERPL-CCNC: 32 U/L
ANION GAP SERPL CALC-SCNC: 9 MMOL/L (ref 0–18)
AST SERPL-CCNC: 36 U/L (ref 15–37)
BASOPHILS # BLD AUTO: 0.13 X10(3) UL (ref 0–0.2)
BASOPHILS NFR BLD AUTO: 1.6 %
BILIRUB SERPL-MCNC: 0.6 MG/DL (ref 0.1–2)
BUN BLD-MCNC: 44 MG/DL (ref 9–23)
CALCIUM BLD-MCNC: 8 MG/DL (ref 8.5–10.1)
CHLORIDE SERPL-SCNC: 110 MMOL/L (ref 98–112)
CHOLEST SERPL-MCNC: 193 MG/DL (ref ?–200)
CO2 SERPL-SCNC: 22 MMOL/L (ref 21–32)
CREAT BLD-MCNC: 5.43 MG/DL
EGFRCR SERPLBLD CKD-EPI 2021: 12 ML/MIN/1.73M2 (ref 60–?)
EOSINOPHIL # BLD AUTO: 0.3 X10(3) UL (ref 0–0.7)
EOSINOPHIL NFR BLD AUTO: 3.8 %
ERYTHROCYTE [DISTWIDTH] IN BLOOD BY AUTOMATED COUNT: 13.2 %
GLOBULIN PLAS-MCNC: 4 G/DL (ref 2.8–4.4)
GLUCOSE BLD-MCNC: 153 MG/DL (ref 70–99)
HCT VFR BLD AUTO: 31.5 %
HDLC SERPL-MCNC: 37 MG/DL (ref 40–59)
HGB BLD-MCNC: 10.8 G/DL
IMM GRANULOCYTES # BLD AUTO: 0.02 X10(3) UL (ref 0–1)
IMM GRANULOCYTES NFR BLD: 0.3 %
LDLC SERPL CALC-MCNC: 106 MG/DL (ref ?–100)
LIPASE SERPL-CCNC: 70 U/L (ref 13–75)
LYMPHOCYTES # BLD AUTO: 1.79 X10(3) UL (ref 1–4)
LYMPHOCYTES NFR BLD AUTO: 22.5 %
MCH RBC QN AUTO: 30.8 PG (ref 26–34)
MCHC RBC AUTO-ENTMCNC: 34.3 G/DL (ref 31–37)
MCV RBC AUTO: 89.7 FL
MONOCYTES # BLD AUTO: 0.72 X10(3) UL (ref 0.1–1)
MONOCYTES NFR BLD AUTO: 9.1 %
MRSA DNA SPEC QL NAA+PROBE: POSITIVE
NEUTROPHILS # BLD AUTO: 4.98 X10 (3) UL (ref 1.5–7.7)
NEUTROPHILS # BLD AUTO: 4.98 X10(3) UL (ref 1.5–7.7)
NEUTROPHILS NFR BLD AUTO: 62.7 %
NONHDLC SERPL-MCNC: 156 MG/DL (ref ?–130)
NT-PROBNP SERPL-MCNC: 5318 PG/ML (ref ?–125)
OSMOLALITY SERPL CALC.SUM OF ELEC: 306 MOSM/KG (ref 275–295)
PLATELET # BLD AUTO: 241 10(3)UL (ref 150–450)
POTASSIUM SERPL-SCNC: 3.8 MMOL/L (ref 3.5–5.1)
PROT SERPL-MCNC: 7.1 G/DL (ref 6.4–8.2)
RBC # BLD AUTO: 3.51 X10(6)UL
SARS-COV-2 RNA RESP QL NAA+PROBE: NOT DETECTED
SODIUM SERPL-SCNC: 141 MMOL/L (ref 136–145)
TRIGL SERPL-MCNC: 293 MG/DL (ref 30–149)
TROPONIN I SERPL HS-MCNC: 1956 NG/L
TROPONIN I SERPL HS-MCNC: 2245 NG/L
VLDLC SERPL CALC-MCNC: 50 MG/DL (ref 0–30)
WBC # BLD AUTO: 7.9 X10(3) UL (ref 4–11)

## 2023-11-23 PROCEDURE — 71275 CT ANGIOGRAPHY CHEST: CPT | Performed by: EMERGENCY MEDICINE

## 2023-11-23 PROCEDURE — 5A1D70Z PERFORMANCE OF URINARY FILTRATION, INTERMITTENT, LESS THAN 6 HOURS PER DAY: ICD-10-PCS | Performed by: INTERNAL MEDICINE

## 2023-11-23 PROCEDURE — 84484 ASSAY OF TROPONIN QUANT: CPT | Performed by: HOSPITALIST

## 2023-11-23 PROCEDURE — 84484 ASSAY OF TROPONIN QUANT: CPT | Performed by: EMERGENCY MEDICINE

## 2023-11-23 PROCEDURE — 90935 HEMODIALYSIS ONE EVALUATION: CPT | Performed by: INTERNAL MEDICINE

## 2023-11-23 PROCEDURE — 99285 EMERGENCY DEPT VISIT HI MDM: CPT

## 2023-11-23 PROCEDURE — 96365 THER/PROPH/DIAG IV INF INIT: CPT

## 2023-11-23 PROCEDURE — 74177 CT ABD & PELVIS W/CONTRAST: CPT | Performed by: EMERGENCY MEDICINE

## 2023-11-23 PROCEDURE — 83690 ASSAY OF LIPASE: CPT | Performed by: EMERGENCY MEDICINE

## 2023-11-23 PROCEDURE — 93005 ELECTROCARDIOGRAM TRACING: CPT

## 2023-11-23 PROCEDURE — 99291 CRITICAL CARE FIRST HOUR: CPT

## 2023-11-23 PROCEDURE — 80053 COMPREHEN METABOLIC PANEL: CPT | Performed by: EMERGENCY MEDICINE

## 2023-11-23 PROCEDURE — 96366 THER/PROPH/DIAG IV INF ADDON: CPT

## 2023-11-23 PROCEDURE — 71045 X-RAY EXAM CHEST 1 VIEW: CPT | Performed by: EMERGENCY MEDICINE

## 2023-11-23 PROCEDURE — 93010 ELECTROCARDIOGRAM REPORT: CPT

## 2023-11-23 PROCEDURE — 80061 LIPID PANEL: CPT | Performed by: EMERGENCY MEDICINE

## 2023-11-23 PROCEDURE — 99292 CRITICAL CARE ADDL 30 MIN: CPT

## 2023-11-23 PROCEDURE — 83880 ASSAY OF NATRIURETIC PEPTIDE: CPT | Performed by: EMERGENCY MEDICINE

## 2023-11-23 PROCEDURE — 96375 TX/PRO/DX INJ NEW DRUG ADDON: CPT

## 2023-11-23 PROCEDURE — 87641 MR-STAPH DNA AMP PROBE: CPT | Performed by: HOSPITALIST

## 2023-11-23 PROCEDURE — 96376 TX/PRO/DX INJ SAME DRUG ADON: CPT

## 2023-11-23 PROCEDURE — 85025 COMPLETE CBC W/AUTO DIFF WBC: CPT | Performed by: EMERGENCY MEDICINE

## 2023-11-23 RX ORDER — CLONIDINE HYDROCHLORIDE 0.1 MG/1
0.1 TABLET ORAL 2 TIMES DAILY
Status: DISCONTINUED | OUTPATIENT
Start: 2023-11-24 | End: 2023-11-25

## 2023-11-23 RX ORDER — SEVELAMER CARBONATE 800 MG/1
800 TABLET, FILM COATED ORAL
Status: DISCONTINUED | OUTPATIENT
Start: 2023-11-24 | End: 2023-11-25

## 2023-11-23 RX ORDER — ASPIRIN 81 MG/1
324 TABLET, CHEWABLE ORAL ONCE
Status: COMPLETED | OUTPATIENT
Start: 2023-11-23 | End: 2023-11-23

## 2023-11-23 RX ORDER — HYDROMORPHONE HYDROCHLORIDE 1 MG/ML
0.8 INJECTION, SOLUTION INTRAMUSCULAR; INTRAVENOUS; SUBCUTANEOUS EVERY 2 HOUR PRN
Status: DISCONTINUED | OUTPATIENT
Start: 2023-11-23 | End: 2023-11-24

## 2023-11-23 RX ORDER — FUROSEMIDE 40 MG/1
80 TABLET ORAL 2 TIMES DAILY
Status: DISCONTINUED | OUTPATIENT
Start: 2023-11-23 | End: 2023-11-25

## 2023-11-23 RX ORDER — LOSARTAN POTASSIUM 100 MG/1
100 TABLET ORAL DAILY
Status: DISCONTINUED | OUTPATIENT
Start: 2023-11-24 | End: 2023-11-25

## 2023-11-23 RX ORDER — ALBUTEROL SULFATE 90 UG/1
2 AEROSOL, METERED RESPIRATORY (INHALATION) EVERY 6 HOURS PRN
Status: DISCONTINUED | OUTPATIENT
Start: 2023-11-23 | End: 2023-11-25

## 2023-11-23 RX ORDER — ACETAMINOPHEN 500 MG
500 TABLET ORAL EVERY 6 HOURS PRN
Status: DISCONTINUED | OUTPATIENT
Start: 2023-11-23 | End: 2023-11-25

## 2023-11-23 RX ORDER — CLONIDINE HYDROCHLORIDE 0.1 MG/1
0.1 TABLET ORAL ONCE
Status: COMPLETED | OUTPATIENT
Start: 2023-11-23 | End: 2023-11-23

## 2023-11-23 RX ORDER — PROCHLORPERAZINE EDISYLATE 5 MG/ML
5 INJECTION INTRAMUSCULAR; INTRAVENOUS EVERY 6 HOURS PRN
Status: DISCONTINUED | OUTPATIENT
Start: 2023-11-23 | End: 2023-11-25

## 2023-11-23 RX ORDER — ONDANSETRON 4 MG/1
4 TABLET, ORALLY DISINTEGRATING ORAL EVERY 6 HOURS PRN
Status: DISCONTINUED | OUTPATIENT
Start: 2023-11-23 | End: 2023-11-25

## 2023-11-23 RX ORDER — FLUOXETINE HYDROCHLORIDE 20 MG/1
40 CAPSULE ORAL DAILY
Status: DISCONTINUED | OUTPATIENT
Start: 2023-11-24 | End: 2023-11-25

## 2023-11-23 RX ORDER — ONDANSETRON 2 MG/ML
INJECTION INTRAMUSCULAR; INTRAVENOUS
Status: COMPLETED
Start: 2023-11-23 | End: 2023-11-23

## 2023-11-23 RX ORDER — HYDROMORPHONE HYDROCHLORIDE 1 MG/ML
0.2 INJECTION, SOLUTION INTRAMUSCULAR; INTRAVENOUS; SUBCUTANEOUS EVERY 2 HOUR PRN
Status: DISCONTINUED | OUTPATIENT
Start: 2023-11-23 | End: 2023-11-25

## 2023-11-23 RX ORDER — HYDRALAZINE HYDROCHLORIDE 25 MG/1
25 TABLET, FILM COATED ORAL 2 TIMES DAILY
Status: DISCONTINUED | OUTPATIENT
Start: 2023-11-23 | End: 2023-11-25

## 2023-11-23 RX ORDER — CARVEDILOL 12.5 MG/1
25 TABLET ORAL 2 TIMES DAILY WITH MEALS
Status: DISCONTINUED | OUTPATIENT
Start: 2023-11-23 | End: 2023-11-25

## 2023-11-23 RX ORDER — NITROGLYCERIN 20 MG/100ML
INJECTION INTRAVENOUS
Status: DISCONTINUED | OUTPATIENT
Start: 2023-11-23 | End: 2023-11-23

## 2023-11-23 RX ORDER — NITROGLYCERIN 20 MG/100ML
INJECTION INTRAVENOUS CONTINUOUS
Status: DISCONTINUED | OUTPATIENT
Start: 2023-11-23 | End: 2023-11-25

## 2023-11-23 RX ORDER — HYDROMORPHONE HYDROCHLORIDE 1 MG/ML
1 INJECTION, SOLUTION INTRAMUSCULAR; INTRAVENOUS; SUBCUTANEOUS ONCE
Status: COMPLETED | OUTPATIENT
Start: 2023-11-23 | End: 2023-11-23

## 2023-11-23 RX ORDER — HYDRALAZINE HYDROCHLORIDE 20 MG/ML
10 INJECTION INTRAMUSCULAR; INTRAVENOUS ONCE
Status: COMPLETED | OUTPATIENT
Start: 2023-11-23 | End: 2023-11-23

## 2023-11-23 RX ORDER — LAMOTRIGINE 150 MG/1
150 TABLET ORAL DAILY
Status: DISCONTINUED | OUTPATIENT
Start: 2023-11-24 | End: 2023-11-25

## 2023-11-23 RX ORDER — HYDROMORPHONE HYDROCHLORIDE 1 MG/ML
0.4 INJECTION, SOLUTION INTRAMUSCULAR; INTRAVENOUS; SUBCUTANEOUS EVERY 2 HOUR PRN
Status: DISCONTINUED | OUTPATIENT
Start: 2023-11-23 | End: 2023-11-24

## 2023-11-23 RX ORDER — ONDANSETRON 2 MG/ML
4 INJECTION INTRAMUSCULAR; INTRAVENOUS EVERY 6 HOURS PRN
Status: DISCONTINUED | OUTPATIENT
Start: 2023-11-23 | End: 2023-11-25

## 2023-11-23 RX ORDER — HEPARIN SODIUM 5000 [USP'U]/ML
5000 INJECTION, SOLUTION INTRAVENOUS; SUBCUTANEOUS EVERY 8 HOURS SCHEDULED
Status: DISCONTINUED | OUTPATIENT
Start: 2023-11-23 | End: 2023-11-25

## 2023-11-23 RX ORDER — FLUTICASONE PROPIONATE 50 MCG
1 SPRAY, SUSPENSION (ML) NASAL DAILY
Status: DISCONTINUED | OUTPATIENT
Start: 2023-11-24 | End: 2023-11-25

## 2023-11-23 RX ORDER — PANTOPRAZOLE SODIUM 40 MG/1
40 TABLET, DELAYED RELEASE ORAL
Status: DISCONTINUED | OUTPATIENT
Start: 2023-11-23 | End: 2023-11-25

## 2023-11-23 RX ORDER — TRAMADOL HYDROCHLORIDE 50 MG/1
50 TABLET ORAL EVERY 12 HOURS PRN
Status: DISCONTINUED | OUTPATIENT
Start: 2023-11-23 | End: 2023-11-25

## 2023-11-23 RX ORDER — HYDRALAZINE HYDROCHLORIDE 20 MG/ML
5 INJECTION INTRAMUSCULAR; INTRAVENOUS ONCE
Status: COMPLETED | OUTPATIENT
Start: 2023-11-23 | End: 2023-11-23

## 2023-11-23 NOTE — ED QUICK NOTES
Orders for admission, patient is aware of plan and ready to go upstairs. Any questions, please call ED RN joel at extension [phone].      Patient Covid vaccination status: Fully vaccinated     COVID Test Ordered in ED: None    COVID Suspicion at Admission: N/A    Running Infusions:    nitroGLYCERIN in dextrose 5% 50 mcg/min (11/23/23 0556)        Mental Status/LOC at time of transport: A&Ox3    Other pertinent information:   CIWA score: N/A   NIH score:  N/A

## 2023-11-24 ENCOUNTER — APPOINTMENT (OUTPATIENT)
Dept: CV DIAGNOSTICS | Facility: HOSPITAL | Age: 43
End: 2023-11-24
Attending: INTERNAL MEDICINE
Payer: MEDICARE

## 2023-11-24 LAB
ANION GAP SERPL CALC-SCNC: 5 MMOL/L (ref 0–18)
ATRIAL RATE: 88 BPM
BASOPHILS # BLD AUTO: 0.13 X10(3) UL (ref 0–0.2)
BASOPHILS NFR BLD AUTO: 1.6 %
BUN BLD-MCNC: 20 MG/DL (ref 9–23)
CALCIUM BLD-MCNC: 8.6 MG/DL (ref 8.5–10.1)
CHLORIDE SERPL-SCNC: 107 MMOL/L (ref 98–112)
CO2 SERPL-SCNC: 28 MMOL/L (ref 21–32)
CREAT BLD-MCNC: 3.08 MG/DL
EGFRCR SERPLBLD CKD-EPI 2021: 25 ML/MIN/1.73M2 (ref 60–?)
EOSINOPHIL # BLD AUTO: 0.2 X10(3) UL (ref 0–0.7)
EOSINOPHIL NFR BLD AUTO: 2.4 %
ERYTHROCYTE [DISTWIDTH] IN BLOOD BY AUTOMATED COUNT: 13.1 %
GLUCOSE BLD-MCNC: 122 MG/DL (ref 70–99)
GLUCOSE BLD-MCNC: 255 MG/DL (ref 70–99)
HCT VFR BLD AUTO: 30.7 %
HGB BLD-MCNC: 10.4 G/DL
IMM GRANULOCYTES # BLD AUTO: 0.02 X10(3) UL (ref 0–1)
IMM GRANULOCYTES NFR BLD: 0.2 %
LYMPHOCYTES # BLD AUTO: 1.32 X10(3) UL (ref 1–4)
LYMPHOCYTES NFR BLD AUTO: 15.8 %
MCH RBC QN AUTO: 29.9 PG (ref 26–34)
MCHC RBC AUTO-ENTMCNC: 33.9 G/DL (ref 31–37)
MCV RBC AUTO: 88.2 FL
MONOCYTES # BLD AUTO: 0.67 X10(3) UL (ref 0.1–1)
MONOCYTES NFR BLD AUTO: 8 %
NEUTROPHILS # BLD AUTO: 6.01 X10 (3) UL (ref 1.5–7.7)
NEUTROPHILS # BLD AUTO: 6.01 X10(3) UL (ref 1.5–7.7)
NEUTROPHILS NFR BLD AUTO: 72 %
OSMOLALITY SERPL CALC.SUM OF ELEC: 294 MOSM/KG (ref 275–295)
P AXIS: 43 DEGREES
P-R INTERVAL: 190 MS
PHOSPHATE SERPL-MCNC: 4.1 MG/DL (ref 2.5–4.9)
PLATELET # BLD AUTO: 226 10(3)UL (ref 150–450)
POTASSIUM SERPL-SCNC: 3.5 MMOL/L (ref 3.5–5.1)
Q-T INTERVAL: 412 MS
QRS DURATION: 88 MS
QTC CALCULATION (BEZET): 498 MS
R AXIS: 19 DEGREES
RBC # BLD AUTO: 3.48 X10(6)UL
SODIUM SERPL-SCNC: 140 MMOL/L (ref 136–145)
T AXIS: 143 DEGREES
TROPONIN I SERPL HS-MCNC: 1757 NG/L
VENTRICULAR RATE: 88 BPM
WBC # BLD AUTO: 8.4 X10(3) UL (ref 4–11)

## 2023-11-24 PROCEDURE — 93306 TTE W/DOPPLER COMPLETE: CPT | Performed by: INTERNAL MEDICINE

## 2023-11-24 PROCEDURE — 84100 ASSAY OF PHOSPHORUS: CPT | Performed by: INTERNAL MEDICINE

## 2023-11-24 PROCEDURE — 85025 COMPLETE CBC W/AUTO DIFF WBC: CPT | Performed by: HOSPITALIST

## 2023-11-24 PROCEDURE — 84484 ASSAY OF TROPONIN QUANT: CPT | Performed by: HOSPITALIST

## 2023-11-24 PROCEDURE — 80048 BASIC METABOLIC PNL TOTAL CA: CPT | Performed by: HOSPITALIST

## 2023-11-24 PROCEDURE — 82962 GLUCOSE BLOOD TEST: CPT

## 2023-11-24 RX ORDER — ASPIRIN 81 MG/1
81 TABLET ORAL DAILY
Status: DISCONTINUED | OUTPATIENT
Start: 2023-11-24 | End: 2023-11-25

## 2023-11-24 RX ORDER — HEPARIN SODIUM 1000 [USP'U]/ML
5000 INJECTION, SOLUTION INTRAVENOUS; SUBCUTANEOUS
Status: DISCONTINUED | OUTPATIENT
Start: 2023-11-24 | End: 2023-11-25

## 2023-11-24 NOTE — PROGRESS NOTES
[facility] Note:  Renal Dose Adjustment for Tramadol [place] Civil)    Karen Mcdowell has been prescribed Tramadol (ULTRAM) 50 mg orally every 6 hours as needed for pain. Estimated Creatinine Clearance: 20 mL/min (A) (based on SCr of 5.43 mg/dL (H)). His calculated creatinine clearance is < 30 ml/min, therefore, the dose of Tramadol (ULTRAM) has been changed to 50 mg every 12 hours as needed for pain per P&T approved protocol. Pharmacy will continue to follow, and if renal function improves, will resume the original order.      Thank you,  Frantz Mclean, PharmD  11/23/2023 7:56 PM

## 2023-11-24 NOTE — PLAN OF CARE
Received pt at 0730. Pt alert and oriented x4. Pt on RA, lungs clear/diminished. Pt in NSR, SBP stable off nitroglycerin gtt. Pt voids in bathroom. Pt complains of abdominal pain, managed with IV dilaudid and PO tramadol. Tolerating diet, no N/V. HD scheduled for tomorrow, [facility] notified. Pt updated on plan of care.

## 2023-11-24 NOTE — CONSULTS
[facility] Cardiology  Report of Consultation    Sissy Brandon Patient Status:  Inpatient    1980 MRN [id number]   [facility] 6NE-A Attending [facility] Day # 1 PCP Solis Ellis MD     Reason for Consultation:   Elevated troponin, HTN, Hx MV repair    History of Present Illness:   Sissy Brandon is a(n) 37year old male with ESRD on HD and hx MVR repair x 2 with annuloplasty ring (at [facility]). Per his outpatient [facility] cardiologist:  \"37year-old male patient of Dr. Sheralyn Epley, past medical history CKD stage IV, hypertension, hyperlipidemia, monoclonal gammopathy, TIA, prior history of drug cocaine abuse (last was 2 years ago and none since then, per the patient), severe mitral valve regurgitation status postsurgical mitral valve repair (robotic repair redo with the placement of 32 mm semirigid posterior annuloplasty ring with a cleft repair of the anterior leaflet). \"    Presented with abdominal pain and SOB. Had missed HD yesterday due to illness. The patient has had abdominal pain off/on for the last year. He has had previous abdominal surgeries in the past (hernia a year ago approximately). Pain is \"crampy\" and non-exertional.  Denies any CP. In ER he was markedly hypertensive (835E systolic) and had elevated BNP and troponin and was admitted. Nephrology was contacted and he subsequently underwent HD. Presently comfortable. Breathing well. No O2. No CP. Some abdominal pain but improved.     Past Medical History:   Past Medical History:   Diagnosis Date    Asthma     Attention deficit hyperactivity disorder (ADHD)     Back problem     Bipolar 1 disorder (HCC)     Cancer (HCC)     CKD (chronic kidney disease) stage 3, GFR 30-59 ml/min (HCC)     Dr Jean Carlos Aguirre    Congestive heart disease ([facility] [address]     COPD (chronic obstructive pulmonary disease) ([facility])     Coronary atherosclerosis     Deep vein thrombosis (HCC)     at age 21 R/T cast    Depression Diabetes [facility])     Essential hypertension 2014    3/21 echo: severe concentric LVH with normal EF and no MR or pHTN    Extrinsic asthma, unspecified     Heart attack ([facility] [address]     2016- angiogram- no intervention    Heart valve disease     mitral valve repair in 1994/    High blood pressure     High cholesterol     History of mitral valve repair 12/2022    Hyperlipidemia     Low back pain     tight and stiff after sweeping and mopping    LVH (left ventricular hypertrophy)     Migraines     Mixed hyperlipidemia 03/2021     HDL 38 LDL 97 VLDL 57     Monoclonal gammopathy     IgG kappa     MVP (mitral valve prolapse)     Repair 1994 at [facility]; echoes as recently as 3/21 show mild or trivial MR and no stenosis    Neuropathy     Osteoarthritis     hip ,knees    Pneumonia due to organism     Pulmonary embolism ([address] 01/2023    Renal disorder     TIA (transient ischemic attack) 03/2021    Initial history of left-sided weakness and slurred speech. (+) cocaine. MRI of the brain, CT angiogram of the head and neck, and 2D echo are all unremarkable.      TMJ (dislocation of temporomandibular joint)     Troponin level elevated 03/2021    Trop 60 60 47 with TIA and no CP: Lexiscan negative with EF 51       Social History:   Smoking:  None  Alcohol:  None    Family History:   No family history of premature arthrosclerotic heart disease     Medications:   Scheduled:    ARIPiprazole  15 mg Oral Daily    carvedilol  25 mg Oral BID with meals    cloNIDine  0.1 mg Oral BID    FLUoxetine HCl  40 mg Oral Daily    fluticasone propionate  1 spray Each Nare Daily    furosemide  80 mg Oral BID    hydrALAZINE  25 mg Oral BID    lamoTRIgine  150 mg Oral Daily    losartan  100 mg Oral Daily    sevelamer carbonate  800 mg Oral TID CC    umeclidinium bromide  1 puff Inhalation Daily    heparin  5,000 Units Subcutaneous Q8H [facility]    pantoprazole  40 mg Oral QAM AC       Continuous Infusion:    nitroGLYCERIN in dextrose 5% Stopped (11/24/23 0000)       PRN Medications:   heparin, acetaminophen, albuterol, traMADol, HYDROmorphone **OR** [DISCONTINUED] HYDROmorphone **OR** [DISCONTINUED] HYDROmorphone, ondansetron **OR** ondansetron, prochlorperazine    Outpatient Medications:   Current Facility-Administered Medications on File Prior to Encounter   Medication Dose Route Frequency Provider Last Rate Last Admin    [COMPLETED] heparin (Porcine) 1000 UNIT/ML injection 1,500 Units  1.5 mL Intracatheter Once Bhupendra Hernandez MD   1,500 Units at 23 1400    [COMPLETED] nitroglycerin (Nitrostat) SL tab 0.4 mg  0.4 mg Sublingual Once Natalie Huang, DO   0.4 mg at 23 0130    [COMPLETED] heparin (Porcine) 1000 UNIT/ML injection 1,500 Units  1.5 mL Intracatheter Once Broderick Gregg, DO   1,500 Units at 23 2344    [COMPLETED] HYDROmorphone (Dilaudid) 1 MG/ML injection 0.5 mg  0.5 mg Intravenous Q30 Min PRN Mary Goldman MD   0.5 mg at 23 0013    [COMPLETED] nitroglycerin (Nitrostat) SL tab 0.4 mg  0.4 mg Sublingual Once Simeon Rock MD   0.4 mg at 11/10/23 1949    [COMPLETED] ondansetron (Zofran) 4 MG/2ML injection 4 mg  4 mg Intravenous Once Ruel Art MD   4 mg at 10/24/23 1806    [] HYDROmorphone (Dilaudid) 1 MG/ML injection 0.5 mg  0.5 mg Intravenous Q30 Min PRN Ruel Art MD        [] ondansetron (Zofran) 4 MG/2ML injection 4 mg  4 mg Intravenous Q4H PRN Ruel Art MD        [COMPLETED] iopamidol 76% (ISOVUE-370) injection for power injector  100 mL Intravenous ONCE PRN Ruel Art MD   100 mL at 10/24/23 1834    [COMPLETED] nitroglycerin (Nitrostat) SL tab 0.4 mg  0.4 mg Sublingual Once Bel Pickering MD   0.4 mg at 10/19/23 1017    [COMPLETED] acetaminophen (Tylenol Extra Strength) tab 1,000 mg  1,000 mg Oral Once Bel Pickering MD   1,000 mg at 10/19/23 1043    [COMPLETED] iopamidol 76% (ISOVUE-370) injection for power injector  100 mL Intravenous ONCE PRN Stephen Sanon Dorota Gutierres MD   100 mL at 10/19/23 1027     Current Outpatient Medications on File Prior to Encounter   Medication Sig Dispense Refill    lamoTRIgine (LAMICTAL) 150 MG Oral Tab Take 1 tablet (150 mg total) by mouth daily. 7 tablet 0    FLUoxetine HCl 40 MG Oral Cap Take 1 capsule (40 mg total) by mouth daily. 7 capsule 0    [] Lisdexamfetamine Dimesylate (VYVANSE) 60 MG Oral Cap Take 1 capsule (60 mg total) by mouth every morning for 7 days. 7 capsule 0    Hyoscyamine Sulfate 0.125 MG Oral Tab Take 1 tablet (0.125 mg total) by mouth daily. potassium chloride 20 MEQ Oral Tab CR Take 1 tablet (20 mEq total) by mouth daily. RENVELA 800 MG Oral Tab Take 1 tablet (800 mg total) by mouth.      losartan 100 MG Oral Tab Take 1 tablet (100 mg total) by mouth daily. furosemide 80 MG Oral Tab Take 1 tablet (80 mg total) by mouth 2 (two) times daily. hydrALAZINE 25 MG Oral Tab Take 1 tablet (25 mg total) by mouth in the morning and 1 tablet (25 mg total) before bedtime. 30 tablet 0    NIFEdipine ER 30 MG Oral Tablet 24 Hr Take 1 tablet (30 mg total) by mouth at bedtime. isosorbide mononitrate ER 60 MG Oral Tablet 24 Hr Take 1 tablet (60 mg total) by mouth daily. 30 tablet 0    cloNIDine 0.1 MG Oral Tab Take 1 tablet (0.1 mg total) by mouth 2 (two) times daily. 60 tablet 0    ergocalciferol 1.25 MG (89634 UT) Oral Cap Take 1 capsule (50,000 Units total) by mouth Every Monday. carvedilol 25 MG Oral Tab Take 1 tablet (25 mg total) by mouth in the morning and 1 tablet (25 mg total) in the evening. Take with meals.  60 tablet 6    Tiotropium Bromide Monohydrate (SPIRIVA HANDIHALER) 18 MCG Inhalation Cap 1 capsule by inhaling the contents of the capsule using the HandiHaler device      albuterol 108 (90 Base) MCG/ACT Inhalation Aero Soln albuterol sulfate HFA 90 mcg/actuation aerosol inhaler   INHALE 1 TO 2 PUFFS BY MOUTH EVERY 4-6 HOURS AS NEEDED FOR COUGH OR WHEEZING      Fenofibrate 134 MG Oral Cap Take 1 capsule by mouth nightly. 90 capsule 1    acetaminophen 500 MG Oral Tab Take 1 tablet (500 mg total) by mouth every 6 (six) hours as needed for Pain. Fluticasone Propionate 50 MCG/ACT Nasal Suspension SPRAY ONCE INTO EACH NOSTRIL BID PRN 15.8 mL 0    [] ARIPiprazole 5 MG Oral Tab Take 3 tablets (15 mg total) by mouth daily for 7 days. 21 tablet 0    NIFEdipine ER 60 MG Oral Tablet 24 Hr Take 1 tablet (60 mg total) by mouth every morning. TRULICITY 2.72 WL/5.7IK Subcutaneous Solution Pen-injector once a week. EVERY MONDAY         Allergies: Allergies   Allergen Reactions    Hydrochlorothiazide RASH and HIVES       Review of Systems:   No fevers, chills, change in weight or bowel habits. Ten point review of systems is otherwise negative or unremarkable. Physical Exam:   Vitals:    23 1000   BP: 120/78   Pulse: 77   Resp: 12   Temp:      Wt Readings from Last 3 Encounters:   23 253 lb 1.6 oz (114.8 kg)   23 257 lb 12.8 oz (116.9 kg)   10/19/23 260 lb (117.9 kg)           General: Well developed, well nourished male. Pt is in no acute distress. HEENT:   Normocephalic. Atraumatic. Eyes with no scleral icterus. Neck: Supple. No JVD. Carotids 2+ and equal in symmetric fashion. No bruits are noted. Cardiac: Regular rate and rhythm. There is a normal S1 and S2. No S3 or S4. No murmurs, rubs, or gallops. PMI is non-displaced with a normal apical impulse. Lungs: Clear to ascultation bilaterally. No focal rales, rhonchi, or wheezes. Good air movement is noted throughout all lung fields. Abdomen: Soft. Non-distended. Non-tender. Bowel sounds are present and normoactive. No guarding or rebound. Extremities: Extremities do not demonstrate any evidence of peripheral edema. No cyanosis or clubbing of the digits is appreciated. Femoral, Dorsalis Pedis, and Posterior Tibialis  pulses are 2+ and equal in a symmetric fashion.   Neurologic: Alert and oriented, normal affect. No gross deficit appreciated. Integument:  No visible rashes are appreciated. Laboratories and Data:   Labs:    Recent Labs   Lab 11/23/23  1526 11/24/23  0437   * 122*   BUN 44* 20   CREATSERUM 5.43* 3.08*   CA 8.0* 8.6   ALB 3.1*  --     140   K 3.8 3.5    107   CO2 22.0 28.0   ALKPHO 135*  --    AST 36  --    ALT 32  --    BILT 0.6  --    TP 7.1  --        Recent Labs   Lab 11/23/23  1526 11/24/23  0437   RBC 3.51* 3.48*   HGB 10.8* 10.4*   HCT 31.5* 30.7*   MCV 89.7 88.2   MCH 30.8 29.9   MCHC 34.3 33.9   RDW 13.2 13.1   NEPRELIM 4.98 6.01   WBC 7.9 8.4   .0 226.0       No results for input(s): \"PTP\", \"INR\" in the last 168 hours. No results for input(s): \"TROP\", \"CK\" in the last 168 hours. Diagnostics:   Tele: Sinus. EKG: Sinus. LVH ST.  Echo: today:  Conclusions:     1. Left ventricle: The cavity size was normal. Wall thickness was moderately      increased. Systolic function was normal. The estimated ejection fraction      was 55-60%. No diagnostic evidence for regional wall motion      abnormalities. Left ventricular diastolic function parameters were      normal.   2. Right ventricle: Systolic function was normal.   3. Left atrium: The atrium was moderately dilated. The left atrial volume      was moderately increased. 4. Mitral valve: An annular ring was present. Transvalvular velocity was      minimally increased. There was mild to moderate regurgitation. The mean      diastolic gradient was 4mm Hg. 5. Pericardium, extracardiac: There was no pericardial effusion. Impressions: This study is compared with previous dated 05-23-23: No major   changes noted. Assessment:  1. ESRD  2. HTN urgency  3. NSTEMI  4. Abdominal pain  5. Hx MV repair x 2  6. Hx PE      Plan:  1. NSTEMI: Peak trop at 2.2K. Downtrending. ASA. Likely \"type II\" due to HTN. No clear WMA by echo and must have had negative cath/ischemic eval 1 year ago prior to MV repair. Needs so ischemic eval before DC either cath or nuclear. Unable to find prior cath report in \"care everywhere\". May need cath Monday if we can't document prior angio. For now medical management and asymptomatic. 2. Hx MVR: Echo with 1-2+ MR, mild to at most moderate. Gradient acceptable. Follow. 3. ESRD: HD, nephrology. 4. Abdominal pain: Chronic. Does not appear to be cardiac. Follow.     Ciera Cruz MD  11/24/2023  11:04 AM

## 2023-11-25 VITALS
HEART RATE: 86 BPM | DIASTOLIC BLOOD PRESSURE: 64 MMHG | TEMPERATURE: 98 F | WEIGHT: 253.13 LBS | BODY MASS INDEX: 32.49 KG/M2 | HEIGHT: 74 IN | SYSTOLIC BLOOD PRESSURE: 119 MMHG | RESPIRATION RATE: 20 BRPM | OXYGEN SATURATION: 98 %

## 2023-11-25 LAB
ALBUMIN SERPL-MCNC: 3.1 G/DL (ref 3.4–5)
ALBUMIN/GLOB SERPL: 0.9 {RATIO} (ref 1–2)
ALP LIVER SERPL-CCNC: 115 U/L
ALT SERPL-CCNC: 22 U/L
ANION GAP SERPL CALC-SCNC: 7 MMOL/L (ref 0–18)
ANION GAP SERPL CALC-SCNC: 7 MMOL/L (ref 0–18)
AST SERPL-CCNC: 17 U/L (ref 15–37)
BASOPHILS # BLD AUTO: 0.11 X10(3) UL (ref 0–0.2)
BASOPHILS NFR BLD AUTO: 1.9 %
BILIRUB SERPL-MCNC: 0.5 MG/DL (ref 0.1–2)
BUN BLD-MCNC: 32 MG/DL (ref 9–23)
BUN BLD-MCNC: 32 MG/DL (ref 9–23)
CALCIUM BLD-MCNC: 8.4 MG/DL (ref 8.5–10.1)
CALCIUM BLD-MCNC: 8.4 MG/DL (ref 8.5–10.1)
CHLORIDE SERPL-SCNC: 107 MMOL/L (ref 98–112)
CHLORIDE SERPL-SCNC: 107 MMOL/L (ref 98–112)
CO2 SERPL-SCNC: 26 MMOL/L (ref 21–32)
CO2 SERPL-SCNC: 26 MMOL/L (ref 21–32)
CREAT BLD-MCNC: 4.73 MG/DL
CREAT BLD-MCNC: 4.73 MG/DL
EGFRCR SERPLBLD CKD-EPI 2021: 15 ML/MIN/1.73M2 (ref 60–?)
EGFRCR SERPLBLD CKD-EPI 2021: 15 ML/MIN/1.73M2 (ref 60–?)
EOSINOPHIL # BLD AUTO: 0.36 X10(3) UL (ref 0–0.7)
EOSINOPHIL NFR BLD AUTO: 6.3 %
ERYTHROCYTE [DISTWIDTH] IN BLOOD BY AUTOMATED COUNT: 13 %
GLOBULIN PLAS-MCNC: 3.6 G/DL (ref 2.8–4.4)
GLUCOSE BLD-MCNC: 112 MG/DL (ref 70–99)
GLUCOSE BLD-MCNC: 112 MG/DL (ref 70–99)
GLUCOSE BLD-MCNC: 114 MG/DL (ref 70–99)
GLUCOSE BLD-MCNC: 144 MG/DL (ref 70–99)
GLUCOSE BLD-MCNC: 94 MG/DL (ref 70–99)
HCT VFR BLD AUTO: 30.5 %
HGB BLD-MCNC: 10.4 G/DL
IMM GRANULOCYTES # BLD AUTO: 0.01 X10(3) UL (ref 0–1)
IMM GRANULOCYTES NFR BLD: 0.2 %
LYMPHOCYTES # BLD AUTO: 1.95 X10(3) UL (ref 1–4)
LYMPHOCYTES NFR BLD AUTO: 34.4 %
MCH RBC QN AUTO: 30.1 PG (ref 26–34)
MCHC RBC AUTO-ENTMCNC: 34.1 G/DL (ref 31–37)
MCV RBC AUTO: 88.4 FL
MONOCYTES # BLD AUTO: 0.52 X10(3) UL (ref 0.1–1)
MONOCYTES NFR BLD AUTO: 9.2 %
NEUTROPHILS # BLD AUTO: 2.72 X10 (3) UL (ref 1.5–7.7)
NEUTROPHILS # BLD AUTO: 2.72 X10(3) UL (ref 1.5–7.7)
NEUTROPHILS NFR BLD AUTO: 48 %
OSMOLALITY SERPL CALC.SUM OF ELEC: 298 MOSM/KG (ref 275–295)
OSMOLALITY SERPL CALC.SUM OF ELEC: 298 MOSM/KG (ref 275–295)
PLATELET # BLD AUTO: 220 10(3)UL (ref 150–450)
POTASSIUM SERPL-SCNC: 3.4 MMOL/L (ref 3.5–5.1)
POTASSIUM SERPL-SCNC: 3.4 MMOL/L (ref 3.5–5.1)
PROT SERPL-MCNC: 6.7 G/DL (ref 6.4–8.2)
RBC # BLD AUTO: 3.45 X10(6)UL
SODIUM SERPL-SCNC: 140 MMOL/L (ref 136–145)
SODIUM SERPL-SCNC: 140 MMOL/L (ref 136–145)
WBC # BLD AUTO: 5.7 X10(3) UL (ref 4–11)

## 2023-11-25 PROCEDURE — 90935 HEMODIALYSIS ONE EVALUATION: CPT | Performed by: INTERNAL MEDICINE

## 2023-11-25 PROCEDURE — 80053 COMPREHEN METABOLIC PANEL: CPT | Performed by: HOSPITALIST

## 2023-11-25 PROCEDURE — 85025 COMPLETE CBC W/AUTO DIFF WBC: CPT | Performed by: HOSPITALIST

## 2023-11-25 PROCEDURE — 82962 GLUCOSE BLOOD TEST: CPT

## 2023-11-25 RX ORDER — CLONIDINE HYDROCHLORIDE 0.1 MG/1
0.1 TABLET ORAL 2 TIMES DAILY PRN
Status: DISCONTINUED | OUTPATIENT
Start: 2023-11-25 | End: 2023-11-25

## 2023-11-25 RX ORDER — NIFEDIPINE 30 MG/1
30 TABLET, EXTENDED RELEASE ORAL EVERY EVENING
Status: DISCONTINUED | OUTPATIENT
Start: 2023-11-25 | End: 2023-11-25

## 2023-11-25 RX ORDER — NIFEDIPINE 30 MG/1
60 TABLET, EXTENDED RELEASE ORAL DAILY
Status: DISCONTINUED | OUTPATIENT
Start: 2023-11-25 | End: 2023-11-25

## 2023-11-25 RX ORDER — ISOSORBIDE MONONITRATE 60 MG/1
60 TABLET, EXTENDED RELEASE ORAL DAILY
Status: DISCONTINUED | OUTPATIENT
Start: 2023-11-25 | End: 2023-11-25

## 2023-11-25 NOTE — PLAN OF CARE
Pt. Is alert and oriented times 4. NSR on tele. Pt. Is on RA. Denies chest pain or shortness of breath. Pt. Is up with one assist. Continent of bowel and bladder. Pt. Complaining of abdominal pain. PRN dilaudid given. Despite dilaudid pt. Still complains of pain. MD notified. No new orders. Pt. Updated on possible discharge today. Call light within reach.    Problem: Patient/Family Goals  Goal: Patient/Family Long Term Goal  Description: Patient's Long Term Goal: Back home  Interventions:  - HD  - See additional Care Plan goals for specific interventions  Outcome: Progressing  Goal: Patient/Family Short Term Goal  Description: Patient's Short Term Goal: Feel better    Interventions:   - HD  - Pain management  - See additional Care Plan goals for specific interventions  Outcome: Progressing

## 2023-11-25 NOTE — PLAN OF CARE
Patient is alert & oriented x4. Pt ambulates w/ SBA. Breath sounds are clear bilateral. On room air. Normal sinus rhythm. Abdominal pain reported, pain meds given. Skin dry and intact. Plan for HD 11/25. Bed in low position and call light within reach. Reviewed plan of care and patient verbalizes understanding.         Problem: Patient/Family Goals  Goal: Patient/Family Long Term Goal  Description: Patient's Long Term Goal: Back home  Interventions:  - HD  - See additional Care Plan goals for specific interventions  Outcome: Progressing  Goal: Patient/Family Short Term Goal  Description: Patient's Short Term Goal: Feel better    Interventions:   - HD  - Pain management  - See additional Care Plan goals for specific interventions  Outcome: Progressing

## 2023-11-25 NOTE — DISCHARGE SUMMARY
[facility] Hospitalist Discharge Summary     Patient ID:  Sneha Márquez  37year old  4/12/1980    Admit date: 11/23/2023    Discharge date and time: 11/25/23     Attending Physician: Josse Strickland*     Primary Care Physician: Whitney Sainz MD     Discharge Diagnoses: Elevated troponin [R79.89]  Abdominal pain, acute [R10.9]  ESRD needing dialysis ([facility] [address] [N18.6, Z99.2]  Hypertensive emergency [I16.1]    Please note that only IHP DMG and EMG patients enrolled in the Medicare ACO, [facility] ACO and [address] [place] [address] will be handled by the [address] [place] [address]'S [facility] Management team.  For all other patients, please follow usual protocol for discharge care transition. Discharge Condition: stable    Disposition:  home    Important Follow up:  - PCP within 2 weeks              Hospital Course:        37year old male with PMH sig for end-stage renal disease requiring dialysis, diabetes mellitus type 2, heart failure with preserved ejection fraction, COPD/asthma, depression/bipolar presented with shortness of breath and abdominal pain. He missed his dialysis on Thursday because he was not feeling well because of his abdominal pain. His last dialysis was on Monday. His abdominal pain has been flaring up and is feeling worse. Also radiating in between his shoulder blades. Does report shortness of breath but denies any fevers or chills. Has 2 admissions in the past month for missed dialysis. In the ER he was hypertensive requiring nitroglycerin infusion. Troponin was 2200 which is higher than his typical.  CT of the chest and abdomen with no acute abnormalities. Admitted to cardiac ICU after consulting nephrology, cardiology.     Missed HD  ESRD  - nephrology consulted  - HD yesterday, plan for tomorrow      Troponin elevation  Demand ischemia vs. NSTEMI  HFpEF  - update echo - similar to prior, no RWMA   - cardiology consulted  - trend troponin      Hypertensive Urgency - resolved  - fluid removal with dialysis  - s/p NTG gtt per cardiology  - resume home coreg, clonidine, lasix, hydralazine, losartan - added nifedipine and imdur as PTA, cont on dc      Abdominal pain, lower quadrants  Unclear etiology  - Has had significant prior w/u,negative per chart  - prior ventral hernia repair  - prior CT abdomen pelvis without any acute finding  - Tylenol for pain at home  - has OP surgery f/u next month      Bipolar disorder  Major depressive disorder  ADHD  - Continue lamotrigine, fluoxetine, vyvanse sub     Type 2 diabetes with hyperglycemia  - Accu-Cheks, ISS, hold Trulicity     COPD  - Resume home inhalers      Consults: IP CONSULT TO CARDIOLOGY  IP CONSULT TO NEPHROLOGY  IP CONSULT TO HOSPITALIST    Operative Procedures:        Patient instructions:      I as the attending physician reconciled the current and discharge medications on day of discharge. Current Discharge Medication List        CONTINUE these medications which have NOT CHANGED    Details   lamoTRIgine (LAMICTAL) 150 MG Oral Tab Take 1 tablet (150 mg total) by mouth daily. FLUoxetine HCl 40 MG Oral Cap Take 1 capsule (40 mg total) by mouth daily. Hyoscyamine Sulfate 0.125 MG Oral Tab Take 1 tablet (0.125 mg total) by mouth daily. potassium chloride 20 MEQ Oral Tab CR Take 1 tablet (20 mEq total) by mouth daily. RENVELA 800 MG Oral Tab Take 1 tablet (800 mg total) by mouth.      losartan 100 MG Oral Tab Take 1 tablet (100 mg total) by mouth daily. furosemide 80 MG Oral Tab Take 1 tablet (80 mg total) by mouth 2 (two) times daily. hydrALAZINE 25 MG Oral Tab Take 1 tablet (25 mg total) by mouth in the morning and 1 tablet (25 mg total) before bedtime. !! NIFEdipine ER 30 MG Oral Tablet 24 Hr Take 1 tablet (30 mg total) by mouth at bedtime. isosorbide mononitrate ER 60 MG Oral Tablet 24 Hr Take 1 tablet (60 mg total) by mouth daily.       cloNIDine 0.1 MG Oral Tab Take 1 tablet (0.1 mg total) by mouth 2 (two) times daily. ergocalciferol 1.25 MG (53564 UT) Oral Cap Take 1 capsule (50,000 Units total) by mouth Every Monday. carvedilol 25 MG Oral Tab Take 1 tablet (25 mg total) by mouth in the morning and 1 tablet (25 mg total) in the evening. Take with meals. Tiotropium Bromide Monohydrate (SPIRIVA HANDIHALER) 18 MCG Inhalation Cap 1 capsule by inhaling the contents of the capsule using the HandiHaler device      albuterol 108 (90 Base) MCG/ACT Inhalation Aero Soln albuterol sulfate HFA 90 mcg/actuation aerosol inhaler   INHALE 1 TO 2 PUFFS BY MOUTH EVERY 4-6 HOURS AS NEEDED FOR COUGH OR WHEEZING      Fenofibrate 134 MG Oral Cap Take 1 capsule by mouth nightly. acetaminophen 500 MG Oral Tab Take 1 tablet (500 mg total) by mouth every 6 (six) hours as needed for Pain. Fluticasone Propionate 50 MCG/ACT Nasal Suspension SPRAY ONCE INTO EACH NOSTRIL BID PRN      !! NIFEdipine ER 60 MG Oral Tablet 24 Hr Take 1 tablet (60 mg total) by mouth every morning. TRULICITY 5.11 YF/9.6LX Subcutaneous Solution Pen-injector once a week. EVERY MONDAY       ! ! - Potential duplicate medications found. Please discuss with provider.         STOP taking these medications       Lisdexamfetamine Dimesylate (VYVANSE) 60 MG Oral Cap        ARIPiprazole 5 MG Oral Tab              Activity: activity as tolerated  Diet: regular diet  Wound Care: as directed  Code Status: Full Code      Discharge Exam:     General: no acute distress, alert and oriented x 3  Heart: RRR  Lungs: clear bilaterally, no active wheezing  Abdomen: nontender, nondistended, intact BS  Extremities: no pedal edema   Neuro: CN inact, no focal deficits      Total time coordinating care for discharge: Greater than 30 minutes    MD Adis VelasquezCincinnati VA Medical Center

## 2023-11-27 NOTE — PAYOR COMM NOTE
--------------  DISCHARGE REVIEW    Payor: Suman Medellin #:  [id number]  Authorization Number: [id number]    Admit date: 11/23/23  Admit time:   7:35 PM  Discharge Date: 11/25/2023  6:30 PM     Admitting Physician: Moni Plascencia MD  Attending Physician:  No att. providers found  Primary Care Physician: Ese Mcclendon MD          Discharge Summary Notes        Discharge Summary signed by Magali Sorto MD at 11/25/2023  1:31 PM       Author: Magali Sorto MD Specialty: Internal Medicine Author Type: Physician    Filed: 11/25/2023  1:31 PM Date of Service: 11/25/2023  1:22 PM Status: Signed    : Magali Sorto MD (Physician)                                                          [facility] Discharge Summary     Patient ID:  Esther Aguilar  37year old  4/12/1980    Admit date: 11/23/2023    Discharge date and time: 11/25/23     Attending Physician: Magali Sorto*     Primary Care Physician: Duc Edgar MD     Discharge Diagnoses: Elevated troponin [R79.89]  Abdominal pain, acute [R10.9]  ESRD needing dialysis ([facility] [address] [N18.6, Z99.2]  Hypertensive emergency [I16.1]    Please note that only [facility] and EMG patients enrolled in the Medicare ACO, [facility]CO and [address] [place] [address] will be handled by the [address] [place] [address] Management team.  For all other patients, please follow usual protocol for discharge care transition. Discharge Condition: stable    Disposition:  home    Important Follow up:  - PCP within 2 weeks              Hospital Course:        37year old male with PMH sig for end-stage renal disease requiring dialysis, diabetes mellitus type 2, heart failure with preserved ejection fraction, COPD/asthma, depression/bipolar presented with shortness of breath and abdominal pain. He missed his dialysis on Thursday because he was not feeling well because of his abdominal pain. His last dialysis was on Monday. His abdominal pain has been flaring up and is feeling worse. Also radiating in between his shoulder blades. Does report shortness of breath but denies any fevers or chills. Has 2 admissions in the past month for missed dialysis. In the ER he was hypertensive requiring nitroglycerin infusion. Troponin was 2200 which is higher than his typical.  CT of the chest and abdomen with no acute abnormalities. Admitted to cardiac ICU after consulting nephrology, cardiology. Missed HD  ESRD  - nephrology consulted  - HD yesterday, plan for tomorrow      Troponin elevation  Demand ischemia vs. NSTEMI  HFpEF  - update echo - similar to prior, no RWMA   - cardiology consulted  - trend troponin      Hypertensive Urgency - resolved  - fluid removal with dialysis  - s/p NTG gtt per cardiology  - resume home coreg, clonidine, lasix, hydralazine, losartan - added nifedipine and imdur as PTA, cont on dc      Abdominal pain, lower quadrants  Unclear etiology  - Has had significant prior w/u,negative per chart  - prior ventral hernia repair  - prior CT abdomen pelvis without any acute finding  - Tylenol for pain at home  - has OP surgery f/u next month      Bipolar disorder  Major depressive disorder  ADHD  - Continue lamotrigine, fluoxetine, vyvanse sub     Type 2 diabetes with hyperglycemia  - Accu-Cheks, ISS, hold Trulicity     COPD  - Resume home inhalers      Consults: IP CONSULT TO CARDIOLOGY  IP CONSULT TO NEPHROLOGY  IP CONSULT TO HOSPITALIST    Operative Procedures:        Patient instructions:      I as the attending physician reconciled the current and discharge medications on day of discharge. Current Discharge Medication List        CONTINUE these medications which have NOT CHANGED    Details   lamoTRIgine (LAMICTAL) 150 MG Oral Tab Take 1 tablet (150 mg total) by mouth daily. FLUoxetine HCl 40 MG Oral Cap Take 1 capsule (40 mg total) by mouth daily.       Hyoscyamine Sulfate 0.125 MG Oral Tab Take 1 tablet (0.125 mg total) by mouth daily. potassium chloride 20 MEQ Oral Tab CR Take 1 tablet (20 mEq total) by mouth daily. RENVELA 800 MG Oral Tab Take 1 tablet (800 mg total) by mouth.      losartan 100 MG Oral Tab Take 1 tablet (100 mg total) by mouth daily. furosemide 80 MG Oral Tab Take 1 tablet (80 mg total) by mouth 2 (two) times daily. hydrALAZINE 25 MG Oral Tab Take 1 tablet (25 mg total) by mouth in the morning and 1 tablet (25 mg total) before bedtime. !! NIFEdipine ER 30 MG Oral Tablet 24 Hr Take 1 tablet (30 mg total) by mouth at bedtime. isosorbide mononitrate ER 60 MG Oral Tablet 24 Hr Take 1 tablet (60 mg total) by mouth daily. cloNIDine 0.1 MG Oral Tab Take 1 tablet (0.1 mg total) by mouth 2 (two) times daily. ergocalciferol 1.25 MG (10168 UT) Oral Cap Take 1 capsule (50,000 Units total) by mouth Every Monday. carvedilol 25 MG Oral Tab Take 1 tablet (25 mg total) by mouth in the morning and 1 tablet (25 mg total) in the evening. Take with meals. Tiotropium Bromide Monohydrate (SPIRIVA HANDIHALER) 18 MCG Inhalation Cap 1 capsule by inhaling the contents of the capsule using the HandiHaler device      albuterol 108 (90 Base) MCG/ACT Inhalation Aero Soln albuterol sulfate HFA 90 mcg/actuation aerosol inhaler   INHALE 1 TO 2 PUFFS BY MOUTH EVERY 4-6 HOURS AS NEEDED FOR COUGH OR WHEEZING      Fenofibrate 134 MG Oral Cap Take 1 capsule by mouth nightly. acetaminophen 500 MG Oral Tab Take 1 tablet (500 mg total) by mouth every 6 (six) hours as needed for Pain. Fluticasone Propionate 50 MCG/ACT Nasal Suspension SPRAY ONCE INTO EACH NOSTRIL BID PRN      !! NIFEdipine ER 60 MG Oral Tablet 24 Hr Take 1 tablet (60 mg total) by mouth every morning. TRULICITY 9.54 XQ/8.1GV Subcutaneous Solution Pen-injector once a week. EVERY MONDAY       ! ! - Potential duplicate medications found. Please discuss with provider.         STOP taking these medications Lisdexamfetamine Dimesylate (VYVANSE) 60 MG Oral Cap        ARIPiprazole 5 MG Oral Tab              Activity: activity as tolerated  Diet: regular diet  Wound Care: as directed  Code Status: Full Code      Discharge Exam:     General: no acute distress, alert and oriented x 3  Heart: RRR  Lungs: clear bilaterally, no active wheezing  Abdomen: nontender, nondistended, intact BS  Extremities: no pedal edema   Neuro: CN inact, no focal deficits      Total time coordinating care for discharge: Greater than 30 minutes    Eri Pendleton MD  [address] [place] [address]        Electronically signed by Sree Fuller MD on 11/25/2023  1:31 PM         REVIEWER COMMENTS

## 2023-12-28 ENCOUNTER — APPOINTMENT (OUTPATIENT)
Dept: CT IMAGING | Age: 43
DRG: 393 | End: 2023-12-28
Attending: EMERGENCY MEDICINE
Payer: MEDICARE

## 2023-12-28 ENCOUNTER — HOSPITAL ENCOUNTER (INPATIENT)
Facility: HOSPITAL | Age: 43
LOS: 3 days | Discharge: HOME | DRG: 393 | End: 2023-12-31
Attending: EMERGENCY MEDICINE | Admitting: HOSPITALIST
Payer: MEDICARE

## 2023-12-28 DIAGNOSIS — K62.5 RECTAL BLEEDING: Primary | ICD-10-CM

## 2023-12-28 LAB
ALBUMIN SERPL-MCNC: 3.3 G/DL (ref 3.4–5)
ALBUMIN/GLOB SERPL: 0.8 {RATIO} (ref 1–2)
ALP LIVER SERPL-CCNC: 119 U/L
ALT SERPL-CCNC: 29 U/L
ANION GAP SERPL CALC-SCNC: 10 MMOL/L (ref 0–18)
ANTIBODY SCREEN: NEGATIVE
AST SERPL-CCNC: 30 U/L (ref 15–37)
ATRIAL RATE: 94 BPM
BASOPHILS # BLD AUTO: 0.08 X10(3) UL (ref 0–0.2)
BASOPHILS # BLD AUTO: 0.09 X10(3) UL (ref 0–0.2)
BASOPHILS NFR BLD AUTO: 1.3 %
BASOPHILS NFR BLD AUTO: 1.5 %
BILIRUB SERPL-MCNC: 0.4 MG/DL (ref 0.1–2)
BUN BLD-MCNC: 56 MG/DL (ref 9–23)
CALCIUM BLD-MCNC: 7.6 MG/DL (ref 8.5–10.1)
CHLORIDE SERPL-SCNC: 99 MMOL/L (ref 98–112)
CO2 SERPL-SCNC: 30 MMOL/L (ref 21–32)
CREAT BLD-MCNC: 7.97 MG/DL
EGFRCR SERPLBLD CKD-EPI 2021: 8 ML/MIN/1.73M2 (ref 60–?)
EOSINOPHIL # BLD AUTO: 0.21 X10(3) UL (ref 0–0.7)
EOSINOPHIL # BLD AUTO: 0.25 X10(3) UL (ref 0–0.7)
EOSINOPHIL NFR BLD AUTO: 3.6 %
EOSINOPHIL NFR BLD AUTO: 4 %
ERYTHROCYTE [DISTWIDTH] IN BLOOD BY AUTOMATED COUNT: 13.1 %
ERYTHROCYTE [DISTWIDTH] IN BLOOD BY AUTOMATED COUNT: 13.2 %
GLOBULIN PLAS-MCNC: 4.1 G/DL (ref 2.8–4.4)
GLUCOSE BLD-MCNC: 110 MG/DL (ref 70–99)
GLUCOSE BLD-MCNC: 94 MG/DL (ref 70–99)
GLUCOSE BLD-MCNC: 96 MG/DL (ref 70–99)
GLUCOSE BLD-MCNC: 97 MG/DL (ref 70–99)
HCT VFR BLD AUTO: 29.8 %
HCT VFR BLD AUTO: 30.9 %
HGB BLD-MCNC: 10.2 G/DL
HGB BLD-MCNC: 10.8 G/DL
IMM GRANULOCYTES # BLD AUTO: 0.01 X10(3) UL (ref 0–1)
IMM GRANULOCYTES # BLD AUTO: 0.01 X10(3) UL (ref 0–1)
IMM GRANULOCYTES NFR BLD: 0.1 %
IMM GRANULOCYTES NFR BLD: 0.2 %
LIPASE SERPL-CCNC: 68 U/L (ref 13–75)
LYMPHOCYTES # BLD AUTO: 1.76 X10(3) UL (ref 1–4)
LYMPHOCYTES # BLD AUTO: 2.08 X10(3) UL (ref 1–4)
LYMPHOCYTES NFR BLD AUTO: 25.5 %
LYMPHOCYTES NFR BLD AUTO: 39.6 %
MCH RBC QN AUTO: 31.5 PG (ref 26–34)
MCH RBC QN AUTO: 31.7 PG (ref 26–34)
MCHC RBC AUTO-ENTMCNC: 34.2 G/DL (ref 31–37)
MCHC RBC AUTO-ENTMCNC: 35 G/DL (ref 31–37)
MCV RBC AUTO: 90.6 FL
MCV RBC AUTO: 92 FL
MONOCYTES # BLD AUTO: 0.57 X10(3) UL (ref 0.1–1)
MONOCYTES # BLD AUTO: 0.79 X10(3) UL (ref 0.1–1)
MONOCYTES NFR BLD AUTO: 10.9 %
MONOCYTES NFR BLD AUTO: 11.4 %
NEUTROPHILS # BLD AUTO: 2.3 X10 (3) UL (ref 1.5–7.7)
NEUTROPHILS # BLD AUTO: 2.3 X10(3) UL (ref 1.5–7.7)
NEUTROPHILS # BLD AUTO: 4 X10 (3) UL (ref 1.5–7.7)
NEUTROPHILS # BLD AUTO: 4 X10(3) UL (ref 1.5–7.7)
NEUTROPHILS NFR BLD AUTO: 43.8 %
NEUTROPHILS NFR BLD AUTO: 58.1 %
OSMOLALITY SERPL CALC.SUM OF ELEC: 304 MOSM/KG (ref 275–295)
P AXIS: 28 DEGREES
P-R INTERVAL: 186 MS
PLATELET # BLD AUTO: 242 10(3)UL (ref 150–450)
PLATELET # BLD AUTO: 257 10(3)UL (ref 150–450)
POTASSIUM SERPL-SCNC: 3.5 MMOL/L (ref 3.5–5.1)
PROT SERPL-MCNC: 7.4 G/DL (ref 6.4–8.2)
Q-T INTERVAL: 422 MS
QRS DURATION: 100 MS
QTC CALCULATION (BEZET): 527 MS
R AXIS: 2 DEGREES
RBC # BLD AUTO: 3.24 X10(6)UL
RBC # BLD AUTO: 3.41 X10(6)UL
RH BLOOD TYPE: POSITIVE
SODIUM SERPL-SCNC: 139 MMOL/L (ref 136–145)
T AXIS: 123 DEGREES
VENTRICULAR RATE: 94 BPM
WBC # BLD AUTO: 5.3 X10(3) UL (ref 4–11)
WBC # BLD AUTO: 6.9 X10(3) UL (ref 4–11)

## 2023-12-28 PROCEDURE — 86900 BLOOD TYPING SEROLOGIC ABO: CPT | Performed by: EMERGENCY MEDICINE

## 2023-12-28 PROCEDURE — 80053 COMPREHEN METABOLIC PANEL: CPT

## 2023-12-28 PROCEDURE — 74176 CT ABD & PELVIS W/O CONTRAST: CPT | Performed by: EMERGENCY MEDICINE

## 2023-12-28 PROCEDURE — 99285 EMERGENCY DEPT VISIT HI MDM: CPT

## 2023-12-28 PROCEDURE — 5A1D70Z PERFORMANCE OF URINARY FILTRATION, INTERMITTENT, LESS THAN 6 HOURS PER DAY: ICD-10-PCS | Performed by: STUDENT IN AN ORGANIZED HEALTH CARE EDUCATION/TRAINING PROGRAM

## 2023-12-28 PROCEDURE — 85025 COMPLETE CBC W/AUTO DIFF WBC: CPT

## 2023-12-28 PROCEDURE — 96375 TX/PRO/DX INJ NEW DRUG ADDON: CPT

## 2023-12-28 PROCEDURE — 83690 ASSAY OF LIPASE: CPT

## 2023-12-28 PROCEDURE — 82962 GLUCOSE BLOOD TEST: CPT

## 2023-12-28 PROCEDURE — 96374 THER/PROPH/DIAG INJ IV PUSH: CPT

## 2023-12-28 PROCEDURE — 86850 RBC ANTIBODY SCREEN: CPT | Performed by: EMERGENCY MEDICINE

## 2023-12-28 PROCEDURE — 85025 COMPLETE CBC W/AUTO DIFF WBC: CPT | Performed by: EMERGENCY MEDICINE

## 2023-12-28 PROCEDURE — 93005 ELECTROCARDIOGRAM TRACING: CPT

## 2023-12-28 PROCEDURE — 93010 ELECTROCARDIOGRAM REPORT: CPT

## 2023-12-28 PROCEDURE — 96376 TX/PRO/DX INJ SAME DRUG ADON: CPT

## 2023-12-28 PROCEDURE — 86901 BLOOD TYPING SEROLOGIC RH(D): CPT | Performed by: EMERGENCY MEDICINE

## 2023-12-28 PROCEDURE — 90935 HEMODIALYSIS ONE EVALUATION: CPT | Performed by: STUDENT IN AN ORGANIZED HEALTH CARE EDUCATION/TRAINING PROGRAM

## 2023-12-28 PROCEDURE — 80053 COMPREHEN METABOLIC PANEL: CPT | Performed by: EMERGENCY MEDICINE

## 2023-12-28 PROCEDURE — 83690 ASSAY OF LIPASE: CPT | Performed by: EMERGENCY MEDICINE

## 2023-12-28 RX ORDER — HYDROCODONE BITARTRATE AND ACETAMINOPHEN 5; 325 MG/1; MG/1
1 TABLET ORAL EVERY 4 HOURS PRN
Status: DISCONTINUED | OUTPATIENT
Start: 2023-12-28 | End: 2023-12-31

## 2023-12-28 RX ORDER — LABETALOL HYDROCHLORIDE 5 MG/ML
20 INJECTION, SOLUTION INTRAVENOUS ONCE
Status: COMPLETED | OUTPATIENT
Start: 2023-12-28 | End: 2023-12-28

## 2023-12-28 RX ORDER — LAMOTRIGINE 150 MG/1
150 TABLET ORAL DAILY
Status: DISCONTINUED | OUTPATIENT
Start: 2023-12-29 | End: 2023-12-31

## 2023-12-28 RX ORDER — CARVEDILOL 12.5 MG/1
25 TABLET ORAL 2 TIMES DAILY WITH MEALS
Status: DISCONTINUED | OUTPATIENT
Start: 2023-12-28 | End: 2023-12-31

## 2023-12-28 RX ORDER — MORPHINE SULFATE 4 MG/ML
4 INJECTION, SOLUTION INTRAMUSCULAR; INTRAVENOUS EVERY 30 MIN PRN
Status: DISCONTINUED | OUTPATIENT
Start: 2023-12-28 | End: 2023-12-31

## 2023-12-28 RX ORDER — HYDROMORPHONE HYDROCHLORIDE 1 MG/ML
0.8 INJECTION, SOLUTION INTRAMUSCULAR; INTRAVENOUS; SUBCUTANEOUS EVERY 2 HOUR PRN
Status: DISCONTINUED | OUTPATIENT
Start: 2023-12-28 | End: 2023-12-31

## 2023-12-28 RX ORDER — ACETAMINOPHEN 325 MG/1
650 TABLET ORAL EVERY 4 HOURS PRN
Status: DISCONTINUED | OUTPATIENT
Start: 2023-12-28 | End: 2023-12-31

## 2023-12-28 RX ORDER — SODIUM CHLORIDE 9 MG/ML
INJECTION, SOLUTION INTRAVENOUS CONTINUOUS
Status: DISCONTINUED | OUTPATIENT
Start: 2023-12-28 | End: 2023-12-29

## 2023-12-28 RX ORDER — PROCHLORPERAZINE EDISYLATE 5 MG/ML
5 INJECTION INTRAMUSCULAR; INTRAVENOUS EVERY 8 HOURS PRN
Status: DISCONTINUED | OUTPATIENT
Start: 2023-12-28 | End: 2023-12-31

## 2023-12-28 RX ORDER — HYDROMORPHONE HYDROCHLORIDE 1 MG/ML
0.2 INJECTION, SOLUTION INTRAMUSCULAR; INTRAVENOUS; SUBCUTANEOUS EVERY 2 HOUR PRN
Status: DISCONTINUED | OUTPATIENT
Start: 2023-12-28 | End: 2023-12-31

## 2023-12-28 RX ORDER — HYDROMORPHONE HYDROCHLORIDE 1 MG/ML
0.4 INJECTION, SOLUTION INTRAMUSCULAR; INTRAVENOUS; SUBCUTANEOUS EVERY 2 HOUR PRN
Status: DISCONTINUED | OUTPATIENT
Start: 2023-12-28 | End: 2023-12-31

## 2023-12-28 RX ORDER — NICOTINE POLACRILEX 4 MG
15 LOZENGE BUCCAL
Status: DISCONTINUED | OUTPATIENT
Start: 2023-12-28 | End: 2023-12-31

## 2023-12-28 RX ORDER — FENOFIBRATE 134 MG/1
1 CAPSULE ORAL NIGHTLY
Status: DISCONTINUED | OUTPATIENT
Start: 2023-12-28 | End: 2023-12-28

## 2023-12-28 RX ORDER — ALBUTEROL SULFATE 90 UG/1
2 AEROSOL, METERED RESPIRATORY (INHALATION) EVERY 6 HOURS PRN
Status: DISCONTINUED | OUTPATIENT
Start: 2023-12-28 | End: 2023-12-31

## 2023-12-28 RX ORDER — CLONIDINE HYDROCHLORIDE 0.1 MG/1
0.1 TABLET ORAL ONCE
Status: COMPLETED | OUTPATIENT
Start: 2023-12-28 | End: 2023-12-28

## 2023-12-28 RX ORDER — NIFEDIPINE 30 MG/1
60 TABLET, EXTENDED RELEASE ORAL EVERY MORNING
Status: DISCONTINUED | OUTPATIENT
Start: 2023-12-28 | End: 2023-12-31

## 2023-12-28 RX ORDER — ONDANSETRON 2 MG/ML
4 INJECTION INTRAMUSCULAR; INTRAVENOUS EVERY 6 HOURS PRN
Status: DISCONTINUED | OUTPATIENT
Start: 2023-12-28 | End: 2023-12-28

## 2023-12-28 RX ORDER — DEXTROSE MONOHYDRATE 25 G/50ML
50 INJECTION, SOLUTION INTRAVENOUS
Status: DISCONTINUED | OUTPATIENT
Start: 2023-12-28 | End: 2023-12-31

## 2023-12-28 RX ORDER — MORPHINE SULFATE 4 MG/ML
4 INJECTION, SOLUTION INTRAMUSCULAR; INTRAVENOUS ONCE
Status: COMPLETED | OUTPATIENT
Start: 2023-12-28 | End: 2023-12-28

## 2023-12-28 RX ORDER — POLYETHYLENE GLYCOL 3350 17 G/17G
17 POWDER, FOR SOLUTION ORAL DAILY PRN
Status: DISCONTINUED | OUTPATIENT
Start: 2023-12-28 | End: 2023-12-31

## 2023-12-28 RX ORDER — SENNOSIDES 8.6 MG
17.2 TABLET ORAL NIGHTLY PRN
Status: DISCONTINUED | OUTPATIENT
Start: 2023-12-28 | End: 2023-12-31

## 2023-12-28 RX ORDER — HYOSCYAMINE SULFATE 0.125 MG
0.12 TABLET,DISINTEGRATING ORAL EVERY 6 HOURS PRN
Status: DISCONTINUED | OUTPATIENT
Start: 2023-12-29 | End: 2023-12-31

## 2023-12-28 RX ORDER — NIFEDIPINE 30 MG/1
30 TABLET, EXTENDED RELEASE ORAL NIGHTLY
Status: DISCONTINUED | OUTPATIENT
Start: 2023-12-28 | End: 2023-12-31

## 2023-12-28 RX ORDER — HYDROCODONE BITARTRATE AND ACETAMINOPHEN 5; 325 MG/1; MG/1
2 TABLET ORAL EVERY 4 HOURS PRN
Status: DISCONTINUED | OUTPATIENT
Start: 2023-12-28 | End: 2023-12-31

## 2023-12-28 RX ORDER — HYDRALAZINE HYDROCHLORIDE 25 MG/1
25 TABLET, FILM COATED ORAL 2 TIMES DAILY
Status: DISCONTINUED | OUTPATIENT
Start: 2023-12-28 | End: 2023-12-31

## 2023-12-28 RX ORDER — FUROSEMIDE 40 MG/1
80 TABLET ORAL 2 TIMES DAILY
Status: DISCONTINUED | OUTPATIENT
Start: 2023-12-28 | End: 2023-12-31

## 2023-12-28 RX ORDER — NICOTINE POLACRILEX 4 MG
30 LOZENGE BUCCAL
Status: DISCONTINUED | OUTPATIENT
Start: 2023-12-28 | End: 2023-12-31

## 2023-12-28 NOTE — ED QUICK NOTES
Orders for admission, patient is aware of plan and ready to go upstairs.  Any questions, please call ED RN Sona Headings at extension [phone]     Vaccinated? unk  Type of COVID test sent:n/a  COVID Suspicion level: Low      Titratable drug(s) infusing:  Rate:  none  LOC at time of transport:alert    Other pertinent information:rectal bleeding, had morphine for pain, labetolol, clonidine    CIWA score=n/a  NIH score= n/a

## 2023-12-29 ENCOUNTER — ANESTHESIA EVENT (OUTPATIENT)
Dept: ENDOSCOPY | Facility: HOSPITAL | Age: 43
DRG: 393 | End: 2023-12-29
Payer: MEDICARE

## 2023-12-29 ENCOUNTER — ANESTHESIA (OUTPATIENT)
Dept: ENDOSCOPY | Facility: HOSPITAL | Age: 43
DRG: 393 | End: 2023-12-29
Payer: MEDICARE

## 2023-12-29 LAB
ALBUMIN SERPL-MCNC: 3.7 G/DL (ref 3.4–5)
ALBUMIN/GLOB SERPL: 0.9 {RATIO} (ref 1–2)
ALP LIVER SERPL-CCNC: 137 U/L
ALT SERPL-CCNC: 25 U/L
ANION GAP SERPL CALC-SCNC: 11 MMOL/L (ref 0–18)
AST SERPL-CCNC: 30 U/L (ref 15–37)
BASOPHILS # BLD AUTO: 0.08 X10(3) UL (ref 0–0.2)
BASOPHILS NFR BLD AUTO: 1.5 %
BILIRUB SERPL-MCNC: 0.4 MG/DL (ref 0.1–2)
BUN BLD-MCNC: 25 MG/DL (ref 9–23)
CALCIUM BLD-MCNC: 8.8 MG/DL (ref 8.5–10.1)
CHLORIDE SERPL-SCNC: 102 MMOL/L (ref 98–112)
CO2 SERPL-SCNC: 27 MMOL/L (ref 21–32)
CREAT BLD-MCNC: 5.16 MG/DL
EGFRCR SERPLBLD CKD-EPI 2021: 13 ML/MIN/1.73M2 (ref 60–?)
EOSINOPHIL # BLD AUTO: 0.31 X10(3) UL (ref 0–0.7)
EOSINOPHIL NFR BLD AUTO: 5.7 %
ERYTHROCYTE [DISTWIDTH] IN BLOOD BY AUTOMATED COUNT: 12.5 %
GLOBULIN PLAS-MCNC: 3.9 G/DL (ref 2.8–4.4)
GLUCOSE BLD-MCNC: 102 MG/DL (ref 70–99)
GLUCOSE BLD-MCNC: 102 MG/DL (ref 70–99)
GLUCOSE BLD-MCNC: 119 MG/DL (ref 70–99)
GLUCOSE BLD-MCNC: 149 MG/DL (ref 70–99)
GLUCOSE BLD-MCNC: 97 MG/DL (ref 70–99)
HCT VFR BLD AUTO: 32.5 %
HGB BLD-MCNC: 10.9 G/DL
IMM GRANULOCYTES # BLD AUTO: 0.01 X10(3) UL (ref 0–1)
IMM GRANULOCYTES NFR BLD: 0.2 %
LYMPHOCYTES # BLD AUTO: 1.58 X10(3) UL (ref 1–4)
LYMPHOCYTES NFR BLD AUTO: 29.3 %
MAGNESIUM SERPL-MCNC: 2.2 MG/DL (ref 1.6–2.6)
MCH RBC QN AUTO: 30.7 PG (ref 26–34)
MCHC RBC AUTO-ENTMCNC: 33.5 G/DL (ref 31–37)
MCV RBC AUTO: 91.5 FL
MONOCYTES # BLD AUTO: 0.45 X10(3) UL (ref 0.1–1)
MONOCYTES NFR BLD AUTO: 8.3 %
NEUTROPHILS # BLD AUTO: 2.97 X10 (3) UL (ref 1.5–7.7)
NEUTROPHILS # BLD AUTO: 2.97 X10(3) UL (ref 1.5–7.7)
NEUTROPHILS NFR BLD AUTO: 55 %
OSMOLALITY SERPL CALC.SUM OF ELEC: 295 MOSM/KG (ref 275–295)
PLATELET # BLD AUTO: 256 10(3)UL (ref 150–450)
POTASSIUM SERPL-SCNC: 3.7 MMOL/L (ref 3.5–5.1)
PROT SERPL-MCNC: 7.6 G/DL (ref 6.4–8.2)
RBC # BLD AUTO: 3.55 X10(6)UL
SODIUM SERPL-SCNC: 140 MMOL/L (ref 136–145)
WBC # BLD AUTO: 5.4 X10(3) UL (ref 4–11)

## 2023-12-29 PROCEDURE — 83735 ASSAY OF MAGNESIUM: CPT | Performed by: HOSPITALIST

## 2023-12-29 PROCEDURE — 80053 COMPREHEN METABOLIC PANEL: CPT | Performed by: HOSPITALIST

## 2023-12-29 PROCEDURE — 82962 GLUCOSE BLOOD TEST: CPT

## 2023-12-29 PROCEDURE — 85025 COMPLETE CBC W/AUTO DIFF WBC: CPT | Performed by: HOSPITALIST

## 2023-12-29 NOTE — PLAN OF CARE
Patient alert and oriented x4. Hypertensive, medications given per MAR. Afebrile. Complains of 7-8/10 abdominal pain, medication given per [facility] with relief. Denies nausea or shortness of breath. Patient received dialysis this evening, tolerated well. IVF infusing per MAR. No overt bleeding noted. Golytely started around midnight, with second half due to start at 600. Safety precautions in place. Call light in reach. Patient did have large bout of emesis last night after initial round of Golytely prep.    0700 - With Golytely prep this morning patient states he is having rumbling but is unable to have a bowel movement. He states it is also becoming uncomfortable. Patient states last time he had a colonoscopy he needed an enema. MD paged.  No new orders at this time, patient to continue drinking Golytely and dayshift RN to update MD.     Problem: Diabetes/Glucose Control  Goal: Glucose maintained within prescribed range  Description: INTERVENTIONS:  - Monitor Blood Glucose as ordered  - Assess for signs and symptoms of hyperglycemia and hypoglycemia  - Administer ordered medications to maintain glucose within target range  - Assess barriers to adequate nutritional intake and initiate nutrition consult as needed  - Instruct patient on self management of diabetes  Outcome: Progressing

## 2023-12-29 NOTE — PLAN OF CARE
NURSING ADMISSION NOTE      Patient admitted via Ambulance  Oriented to room. Safety precautions initiated. Bed in low position. Call light in reach. Pt a/o x4. Admitted for GI bleed. Admission in navigator completed with patient. VSS, remained afebrile. Meds given per MAR. Dilaudid given with moderate relief for abdominal pain. [facility] called. C-diff stool sample still needed to be collected. Call light within reach.

## 2023-12-30 VITALS
TEMPERATURE: 98 F | SYSTOLIC BLOOD PRESSURE: 141 MMHG | WEIGHT: 254.88 LBS | HEART RATE: 91 BPM | RESPIRATION RATE: 20 BRPM | OXYGEN SATURATION: 100 % | DIASTOLIC BLOOD PRESSURE: 95 MMHG | BODY MASS INDEX: 33 KG/M2

## 2023-12-30 LAB
ANION GAP SERPL CALC-SCNC: 11 MMOL/L (ref 0–18)
BUN BLD-MCNC: 32 MG/DL (ref 9–23)
C DIFF TOX B STL QL: NEGATIVE
CALCIUM BLD-MCNC: 8.5 MG/DL (ref 8.5–10.1)
CHLORIDE SERPL-SCNC: 103 MMOL/L (ref 98–112)
CO2 SERPL-SCNC: 28 MMOL/L (ref 21–32)
CREAT BLD-MCNC: 6.27 MG/DL
EGFRCR SERPLBLD CKD-EPI 2021: 10 ML/MIN/1.73M2 (ref 60–?)
ERYTHROCYTE [DISTWIDTH] IN BLOOD BY AUTOMATED COUNT: 12.4 %
GLUCOSE BLD-MCNC: 120 MG/DL (ref 70–99)
GLUCOSE BLD-MCNC: 137 MG/DL (ref 70–99)
GLUCOSE BLD-MCNC: 78 MG/DL (ref 70–99)
GLUCOSE BLD-MCNC: 88 MG/DL (ref 70–99)
HCT VFR BLD AUTO: 29.8 %
HGB BLD-MCNC: 10.2 G/DL
MCH RBC QN AUTO: 31.1 PG (ref 26–34)
MCHC RBC AUTO-ENTMCNC: 34.2 G/DL (ref 31–37)
MCV RBC AUTO: 90.9 FL
OSMOLALITY SERPL CALC.SUM OF ELEC: 300 MOSM/KG (ref 275–295)
PLATELET # BLD AUTO: 229 10(3)UL (ref 150–450)
POTASSIUM SERPL-SCNC: 3.2 MMOL/L (ref 3.5–5.1)
RBC # BLD AUTO: 3.28 X10(6)UL
SODIUM SERPL-SCNC: 142 MMOL/L (ref 136–145)
WBC # BLD AUTO: 4.3 X10(3) UL (ref 4–11)

## 2023-12-30 PROCEDURE — 0DBN8ZX EXCISION OF SIGMOID COLON, VIA NATURAL OR ARTIFICIAL OPENING ENDOSCOPIC, DIAGNOSTIC: ICD-10-PCS | Performed by: INTERNAL MEDICINE

## 2023-12-30 PROCEDURE — 82962 GLUCOSE BLOOD TEST: CPT

## 2023-12-30 PROCEDURE — 87493 C DIFF AMPLIFIED PROBE: CPT | Performed by: HOSPITALIST

## 2023-12-30 PROCEDURE — 85027 COMPLETE CBC AUTOMATED: CPT | Performed by: HOSPITALIST

## 2023-12-30 PROCEDURE — 80048 BASIC METABOLIC PNL TOTAL CA: CPT | Performed by: HOSPITALIST

## 2023-12-30 PROCEDURE — 0DBL8ZX EXCISION OF TRANSVERSE COLON, VIA NATURAL OR ARTIFICIAL OPENING ENDOSCOPIC, DIAGNOSTIC: ICD-10-PCS | Performed by: INTERNAL MEDICINE

## 2023-12-30 PROCEDURE — 88305 TISSUE EXAM BY PATHOLOGIST: CPT | Performed by: INTERNAL MEDICINE

## 2023-12-30 PROCEDURE — 90935 HEMODIALYSIS ONE EVALUATION: CPT | Performed by: STUDENT IN AN ORGANIZED HEALTH CARE EDUCATION/TRAINING PROGRAM

## 2023-12-30 RX ORDER — BISACODYL 5 MG/1
15 TABLET, DELAYED RELEASE ORAL DAILY
Status: DISCONTINUED | OUTPATIENT
Start: 2023-12-30 | End: 2023-12-31

## 2023-12-30 RX ORDER — KETAMINE HYDROCHLORIDE 50 MG/ML
INJECTION, SOLUTION INTRAMUSCULAR; INTRAVENOUS AS NEEDED
Status: DISCONTINUED | OUTPATIENT
Start: 2023-12-30 | End: 2023-12-30 | Stop reason: SURG

## 2023-12-30 RX ORDER — NALOXONE HYDROCHLORIDE 0.4 MG/ML
0.08 INJECTION, SOLUTION INTRAMUSCULAR; INTRAVENOUS; SUBCUTANEOUS ONCE AS NEEDED
Status: DISCONTINUED | OUTPATIENT
Start: 2023-12-30 | End: 2023-12-30 | Stop reason: HOSPADM

## 2023-12-30 RX ORDER — SODIUM CHLORIDE 9 MG/ML
INJECTION, SOLUTION INTRAVENOUS CONTINUOUS PRN
Status: DISCONTINUED | OUTPATIENT
Start: 2023-12-30 | End: 2023-12-30 | Stop reason: SURG

## 2023-12-30 RX ORDER — HEPARIN SODIUM 1000 [USP'U]/ML
5000 INJECTION, SOLUTION INTRAVENOUS; SUBCUTANEOUS
Status: DISCONTINUED | OUTPATIENT
Start: 2023-12-30 | End: 2023-12-31

## 2023-12-30 RX ORDER — SODIUM CHLORIDE, SODIUM LACTATE, POTASSIUM CHLORIDE, CALCIUM CHLORIDE 600; 310; 30; 20 MG/100ML; MG/100ML; MG/100ML; MG/100ML
INJECTION, SOLUTION INTRAVENOUS CONTINUOUS
Status: DISCONTINUED | OUTPATIENT
Start: 2023-12-30 | End: 2023-12-31

## 2023-12-30 RX ORDER — BISACODYL 5 MG/1
15 TABLET, DELAYED RELEASE ORAL DAILY
Qty: 30 TABLET | Refills: 0 | Status: SHIPPED | OUTPATIENT
Start: 2023-12-30

## 2023-12-30 RX ORDER — POLYETHYLENE GLYCOL 3350 17 G/17G
17 POWDER, FOR SOLUTION ORAL 2 TIMES DAILY
Qty: 60 PACKET | Refills: 0 | Status: SHIPPED | OUTPATIENT
Start: 2023-12-30

## 2023-12-30 RX ORDER — LIDOCAINE HYDROCHLORIDE 10 MG/ML
INJECTION, SOLUTION EPIDURAL; INFILTRATION; INTRACAUDAL; PERINEURAL AS NEEDED
Status: DISCONTINUED | OUTPATIENT
Start: 2023-12-30 | End: 2023-12-30 | Stop reason: SURG

## 2023-12-30 RX ADMIN — KETAMINE HYDROCHLORIDE 10 MG: 50 INJECTION, SOLUTION INTRAMUSCULAR; INTRAVENOUS at 11:05:00

## 2023-12-30 RX ADMIN — SODIUM CHLORIDE: 9 INJECTION, SOLUTION INTRAVENOUS at 10:55:00

## 2023-12-30 RX ADMIN — LIDOCAINE HYDROCHLORIDE 50 MG: 10 INJECTION, SOLUTION EPIDURAL; INFILTRATION; INTRACAUDAL; PERINEURAL at 10:58:00

## 2023-12-30 NOTE — PLAN OF CARE
Pt A/O x4. VSS. Afebrile. Pt c/o abdominal pain during day, PRN pain medication admin per MAR. Pt reports some relief w/ pain medication. Pt had received prep last night per night RN. Pt denies having BM since prep started. Abdomen palpated, nondistended. Abdomen soft. GI updated. Per GI and order, pt to complete second round of prep today w/ goal of colonoscopy tomorrow. Pt receiving second round of prep per MAR. Pt ambulated safely in cook. R/O c. Diff, contact iso precautions maintained. Fall and safety precautions in place. Call light within reach.

## 2023-12-30 NOTE — PLAN OF CARE
A&Ox4, Ra, ad osvaldo, VSS & afebrile. Bowel prep completed @ 0130.    NPO  PRN IV dilaudid given @ 0200    Stool sample collected - pending result    Plan: colonoscopy and HD on 12/30    Problem: Diabetes/Glucose Control  Goal: Glucose maintained within prescribed range  Description: INTERVENTIONS:  - Monitor Blood Glucose as ordered  - Assess for signs and symptoms of hyperglycemia and hypoglycemia  - Administer ordered medications to maintain glucose within target range  - Assess barriers to adequate nutritional intake and initiate nutrition consult as needed  - Instruct patient on self management of diabetes  Outcome: Progressing     Problem: GASTROINTESTINAL - ADULT  Goal: Maintains or returns to baseline bowel function  Description: INTERVENTIONS:  - Assess bowel function  - Maintain adequate hydration with IV or PO as ordered and tolerated  - Evaluate effectiveness of GI medications  - Encourage mobilization and activity  - Obtain nutritional consult as needed  - Establish a toileting routine/schedule  - Consider collaborating with pharmacy to review patient's medication profile  Outcome: Progressing     Problem: PAIN - ADULT  Goal: Verbalizes/displays adequate comfort level or patient's stated pain goal  Description: INTERVENTIONS:  - Encourage pt to monitor pain and request assistance  - Assess pain using appropriate pain scale  - Administer analgesics based on type and severity of pain and evaluate response  - Implement non-pharmacological measures as appropriate and evaluate response  - Consider cultural and social influences on pain and pain management  - Manage/alleviate anxiety  - Utilize distraction and/or relaxation techniques  - Monitor for opioid side effects  - Notify MD/LIP if interventions unsuccessful or patient reports new pain  - Anticipate increased pain with activity and pre-medicate as appropriate  Outcome: Progressing

## 2023-12-30 NOTE — BRIEF OP NOTE
Pre-Operative Diagnosis: Rectal bleeding [K62.5]     Post-Operative Diagnosis: colon polyps, hemorrhoids      Procedure Performed:   COLONOSCOPY with cold snare polypectomy and forcep polypectomy    Surgeon(s) and Role:     * Bing Ortega MD - Primary    Assistant(s):        Surgical Findings: see above     Specimen: see op note     Estimated Blood Loss: No data recorded    Dictation Number:  none    Richa Alcala MD  12/30/2023  11:31 AM

## 2023-12-30 NOTE — OPERATIVE REPORT
PATIENT NAME: Karina Phelan  MRN: [id number]  DATE OF OPERATION:  12/30/23  REFERRING PHYSICIAN: Dr. Murlene Merlin  Medications:  [facility]  Date of last COLONOSCOPY:  2023  PREOPERATIVE DIAGNOSIS                Rectal bleeding       Chronic constipation  POSTOPERATIVE DIAGNOSIS:  7 mm transverse colon polyp removed via cold snare. 5 mm sigmoid colon polyps x 2 were removed via cold biopsy forceps. Diverticulosis were scattered throughout the colon. Enlarged internal hemorrhoids. PROCEDURE PERFORMED:               Colonoscopy with polypectomy via cold biopsy forceps and cold snare  Endoscopist:                                             Yu Arredondo MD     PROCEDURE AND FINDINGS: The patient was placed into the left lateral decubitus after informed consent was obtained. All questions were answered. An updated history and physical were performed and an ASA score of 3 was assigned. Informed consent was obtained prior to the procedure, with review of possible complications including bleeding, infection, and missed polyps and or cancer. MAC sedation was administered. A digital rectal exam was performed and was normal.  The video adult colonoscope was passed from anus to cecum. The ileocecal valve, appendiceal orfice, hepatic and splenic flexures were all well visualized. The bowel preparation was rated on the Aronchick bowel prep scale as 1. (1 - excellent > 95% mucosa seen; 2 - good - clear liquid up to 25% of the mucosa, 90% mucosa seen;  3 - fair - semisolid stool not suctioned, but 90% of the mucosa seen; 4 - poor - semisolid stool not suctioned, but < 90% mucosa seen; 5 - inadequate - repeat prep needed) . Findings included 7 mm transverse colon polyp removed via cold snare. 5 mm sigmoid colon polyps x 2 were removed via cold biopsy forceps. Diverticulosis were scattered throughout the colon. No blood was seen in the colon.   On retroflexion of the scope in the anorectum, normal internal hemorrhoids were noted.     The scope withdrawal from cecum to anus was 11 minutes. RECOMMENDATIONS         1. Resume general diet. 2. Recommendations regarding repeat colonoscopy will be made once the biopsy results are reviewed. 3. Miralax 1 dose twice daily and bisacodyl 15 mg per day. 4. Ok to discharge to home from the GI perspective.     Dany Johnson MD, Gastroenterologist  Cc: Dr. Umer Mccormick

## 2023-12-30 NOTE — PLAN OF CARE
Fred was called again to follow up ETA. Still hasn't heard from dialysis nurse. Patient is updated. He is anticipating dialysis will be done so late tonight that he will need to stay overnight. Updated Dr Meghan Sadler re this.

## 2023-12-30 NOTE — PLAN OF CARE
S/p colonoscopy. Returned to the unit awake & alert. Complaining for abdominal pain. Per endo report,may resume diet, 2 polyps noted 7 taken out, biopsies taken & no active bleeding noted. 1400  Patient was cleared for discharge after dialysis tratment today. RN called [facility] to follow up for ETA. Awaiting call back.

## 2023-12-30 NOTE — ANESTHESIA POSTPROCEDURE EVALUATION
[address] Patient Status:  Inpatient   Age/Gender 37year old male MRN [id number]   Location [address] [address] Attending Tammy Oreilly MD   [facility] Day # 2 PCP El Reynolds MD       Anesthesia Post-op Note    COLONOSCOPY with cold snare polypectomy and forcep polypectomy    Procedure Summary       Date: 12/30/23 Room / Location: [facility] ENDOSCOPY 02 / 1404 [facility] ENDOSCOPY    Anesthesia Start: 8314 Anesthesia Stop: 6538    Procedure: COLONOSCOPY with cold snare polypectomy and forcep polypectomy Diagnosis:       Rectal bleeding      (colon polyps, hemorrhoids)    Surgeons: Devora Wise MD Anesthesiologist: Cha Tran MD    Anesthesia Type: MAC ASA Status: 3            Anesthesia Type: No value filed. Vitals Value Taken Time   /77 12/30/23 1131   Temp  12/30/23 1131   Pulse 93 12/30/23 1131   Resp 57 12/30/23 1131   SpO2 98 % 12/30/23 1131   Vitals shown include unfiled device data. Patient Location: Endoscopy    Anesthesia Type: MAC    Airway Patency: patent    Postop Pain Control: adequate    Mental Status: preanesthetic baseline    Nausea/Vomiting: none    Cardiopulmonary/Hydration status: stable euvolemic    Complications: no apparent anesthesia related complications    Postop vital signs: stable    Dental Exam: Unchanged from Preop    Patient to be discharged from PACU when criteria met.

## 2023-12-30 NOTE — PLAN OF CARE
Resting in bed. NPO maintained for colonoscopy. Received call from endo that patient will come down at 0900 for procedure. Dialysis nurse here & he was notified patient has  a procedure. He came in without calling to notify his ETA. He was told patient has colonoscopy. He told RN he kayode come back when procedure is done.

## 2023-12-31 NOTE — PLAN OF CARE
Dialysis nurse arrived 1915 this evening. HE initiated dialysis & will be finished in 3.5 hours. Patient was asked if he is leaving tonight since he has a discharge order. He said he plans to go home after dialysis tonight. MD updated.

## 2023-12-31 NOTE — PLAN OF CARE
Received pt alert and oriented X4. Pt DC after dialysis. All paperwork provided. All questions answered. Pt wheeled to ED to take uber home. VSS. Pt got 2.5 during dialysis. Fall precautions in place, call light in reach. NURSING DISCHARGE NOTE     Discharged Home via Wheelchair. Accompanied by RN  Belongings Taken by patient/family.

## 2024-01-03 ENCOUNTER — HOSPITAL ENCOUNTER (OUTPATIENT)
Facility: HOSPITAL | Age: 44
Setting detail: OBSERVATION
Discharge: HOME | End: 2024-01-06
Attending: EMERGENCY MEDICINE | Admitting: HOSPITALIST
Payer: MEDICARE

## 2024-01-03 ENCOUNTER — APPOINTMENT (OUTPATIENT)
Dept: GENERAL RADIOLOGY | Age: 44
End: 2024-01-03
Attending: EMERGENCY MEDICINE
Payer: MEDICARE

## 2024-01-03 ENCOUNTER — OFFICE VISIT (OUTPATIENT)
Dept: FAMILY MEDICINE CLINIC | Facility: CLINIC | Age: 44
End: 2024-01-03
Payer: COMMERCIAL

## 2024-01-03 VITALS
RESPIRATION RATE: 16 BRPM | WEIGHT: 250 LBS | HEART RATE: 99 BPM | HEIGHT: 74 IN | DIASTOLIC BLOOD PRESSURE: 60 MMHG | OXYGEN SATURATION: 98 % | BODY MASS INDEX: 32.08 KG/M2 | TEMPERATURE: 99 F | SYSTOLIC BLOOD PRESSURE: 98 MMHG

## 2024-01-03 DIAGNOSIS — U07.1 COVID-19 VIRUS INFECTION: Primary | ICD-10-CM

## 2024-01-03 DIAGNOSIS — R06.02 SOB (SHORTNESS OF BREATH): ICD-10-CM

## 2024-01-03 DIAGNOSIS — I95.9 HYPOTENSION, UNSPECIFIED HYPOTENSION TYPE: ICD-10-CM

## 2024-01-03 DIAGNOSIS — R42 DIZZY: ICD-10-CM

## 2024-01-03 LAB
ALBUMIN SERPL-MCNC: 3.4 G/DL (ref 3.4–5)
ALBUMIN/GLOB SERPL: 0.9 {RATIO} (ref 1–2)
ALP LIVER SERPL-CCNC: 110 U/L
ALT SERPL-CCNC: 61 U/L
ANION GAP SERPL CALC-SCNC: 8 MMOL/L (ref 0–18)
AST SERPL-CCNC: 45 U/L (ref 15–37)
BASOPHILS # BLD AUTO: 0.06 X10(3) UL (ref 0–0.2)
BASOPHILS NFR BLD AUTO: 1.3 %
BILIRUB SERPL-MCNC: 0.3 MG/DL (ref 0.1–2)
BILIRUB UR QL STRIP.AUTO: NEGATIVE
BUN BLD-MCNC: 30 MG/DL (ref 9–23)
CALCIUM BLD-MCNC: 7.2 MG/DL (ref 8.5–10.1)
CHLORIDE SERPL-SCNC: 106 MMOL/L (ref 98–112)
CLARITY UR REFRACT.AUTO: CLEAR
CO2 SERPL-SCNC: 25 MMOL/L (ref 21–32)
COLOR UR AUTO: YELLOW
CREAT BLD-MCNC: 7.18 MG/DL
EGFRCR SERPLBLD CKD-EPI 2021: 9 ML/MIN/1.73M2 (ref 60–?)
EOSINOPHIL # BLD AUTO: 0.08 X10(3) UL (ref 0–0.7)
EOSINOPHIL NFR BLD AUTO: 1.7 %
ERYTHROCYTE [DISTWIDTH] IN BLOOD BY AUTOMATED COUNT: 12.6 %
GLOBULIN PLAS-MCNC: 3.9 G/DL (ref 2.8–4.4)
GLUCOSE BLD-MCNC: 103 MG/DL (ref 70–99)
GLUCOSE UR STRIP.AUTO-MCNC: NEGATIVE MG/DL
HCT VFR BLD AUTO: 27.9 %
HGB BLD-MCNC: 9.4 G/DL
IMM GRANULOCYTES # BLD AUTO: 0.01 X10(3) UL (ref 0–1)
IMM GRANULOCYTES NFR BLD: 0.2 %
LACTATE SERPL-SCNC: 1.5 MMOL/L (ref 0.4–2)
LEUKOCYTE ESTERASE UR QL STRIP.AUTO: NEGATIVE
LYMPHOCYTES # BLD AUTO: 0.95 X10(3) UL (ref 1–4)
LYMPHOCYTES NFR BLD AUTO: 20.5 %
MCH RBC QN AUTO: 30.8 PG (ref 26–34)
MCHC RBC AUTO-ENTMCNC: 33.7 G/DL (ref 31–37)
MCV RBC AUTO: 91.5 FL
MONOCYTES # BLD AUTO: 0.98 X10(3) UL (ref 0.1–1)
MONOCYTES NFR BLD AUTO: 21.1 %
NEUTROPHILS # BLD AUTO: 2.56 X10 (3) UL (ref 1.5–7.7)
NEUTROPHILS # BLD AUTO: 2.56 X10(3) UL (ref 1.5–7.7)
NEUTROPHILS NFR BLD AUTO: 55.2 %
NITRITE UR QL STRIP.AUTO: NEGATIVE
OPERATOR ID: ABNORMAL
OSMOLALITY SERPL CALC.SUM OF ELEC: 294 MOSM/KG (ref 275–295)
PH UR STRIP.AUTO: 5.5 [PH] (ref 5–8)
PLATELET # BLD AUTO: 164 10(3)UL (ref 150–450)
POTASSIUM SERPL-SCNC: 3.8 MMOL/L (ref 3.5–5.1)
PROT SERPL-MCNC: 7.3 G/DL (ref 6.4–8.2)
PROT UR STRIP.AUTO-MCNC: >=300 MG/DL
RAPID SARS-COV-2 BY PCR: DETECTED
RBC # BLD AUTO: 3.05 X10(6)UL
RBC UR QL AUTO: NEGATIVE
SODIUM SERPL-SCNC: 139 MMOL/L (ref 136–145)
SP GR UR STRIP.AUTO: 1.02 (ref 1–1.03)
TROPONIN I SERPL HS-MCNC: 275 NG/L
UROBILINOGEN UR STRIP.AUTO-MCNC: 0.2 MG/DL
WBC # BLD AUTO: 4.6 X10(3) UL (ref 4–11)

## 2024-01-03 PROCEDURE — 81015 MICROSCOPIC EXAM OF URINE: CPT | Performed by: EMERGENCY MEDICINE

## 2024-01-03 PROCEDURE — 3078F DIAST BP <80 MM HG: CPT | Performed by: NURSE PRACTITIONER

## 2024-01-03 PROCEDURE — 96361 HYDRATE IV INFUSION ADD-ON: CPT

## 2024-01-03 PROCEDURE — 36415 COLL VENOUS BLD VENIPUNCTURE: CPT

## 2024-01-03 PROCEDURE — 84484 ASSAY OF TROPONIN QUANT: CPT | Performed by: EMERGENCY MEDICINE

## 2024-01-03 PROCEDURE — 81001 URINALYSIS AUTO W/SCOPE: CPT | Performed by: EMERGENCY MEDICINE

## 2024-01-03 PROCEDURE — 93005 ELECTROCARDIOGRAM TRACING: CPT

## 2024-01-03 PROCEDURE — 83605 ASSAY OF LACTIC ACID: CPT | Performed by: EMERGENCY MEDICINE

## 2024-01-03 PROCEDURE — 3074F SYST BP LT 130 MM HG: CPT | Performed by: NURSE PRACTITIONER

## 2024-01-03 PROCEDURE — 3008F BODY MASS INDEX DOCD: CPT | Performed by: NURSE PRACTITIONER

## 2024-01-03 PROCEDURE — 99285 EMERGENCY DEPT VISIT HI MDM: CPT

## 2024-01-03 PROCEDURE — 71046 X-RAY EXAM CHEST 2 VIEWS: CPT | Performed by: EMERGENCY MEDICINE

## 2024-01-03 PROCEDURE — 99215 OFFICE O/P EST HI 40 MIN: CPT | Performed by: NURSE PRACTITIONER

## 2024-01-03 PROCEDURE — 93010 ELECTROCARDIOGRAM REPORT: CPT

## 2024-01-03 PROCEDURE — 85025 COMPLETE CBC W/AUTO DIFF WBC: CPT | Performed by: EMERGENCY MEDICINE

## 2024-01-03 PROCEDURE — 80061 LIPID PANEL: CPT | Performed by: EMERGENCY MEDICINE

## 2024-01-03 PROCEDURE — 87040 BLOOD CULTURE FOR BACTERIA: CPT | Performed by: EMERGENCY MEDICINE

## 2024-01-03 PROCEDURE — 80053 COMPREHEN METABOLIC PANEL: CPT | Performed by: EMERGENCY MEDICINE

## 2024-01-03 PROCEDURE — 96374 THER/PROPH/DIAG INJ IV PUSH: CPT

## 2024-01-03 PROCEDURE — U0002 COVID-19 LAB TEST NON-CDC: HCPCS | Performed by: NURSE PRACTITIONER

## 2024-01-03 RX ORDER — MORPHINE SULFATE 4 MG/ML
2 INJECTION, SOLUTION INTRAMUSCULAR; INTRAVENOUS ONCE
Status: COMPLETED | OUTPATIENT
Start: 2024-01-03 | End: 2024-01-03

## 2024-01-03 RX ORDER — SODIUM CHLORIDE 9 MG/ML
INJECTION, SOLUTION INTRAVENOUS CONTINUOUS
Status: DISCONTINUED | OUTPATIENT
Start: 2024-01-03 | End: 2024-01-03

## 2024-01-03 RX ORDER — ACETAMINOPHEN 500 MG
1000 TABLET ORAL ONCE
Status: COMPLETED | OUTPATIENT
Start: 2024-01-03 | End: 2024-01-03

## 2024-01-03 NOTE — PROGRESS NOTES
CHIEF COMPLAINT:     Chief Complaint   Patient presents with    Nasal Congestion     Chest congestion, s/s for 2 days.  No OTC meds taken.  Ear drainage and difficulties breathing.         HPI:   Godwin Fonseca is a 43 year old male who presents for ill symptoms for 2 days. Patient reports body aches, chest congestion, shortness of breath, dizzy, hard time breathing. Sent from nephrologist for low blood pressure. Reports normally runs 190's systolic. Reports was 80's in nephrologist office. Denies headache. Symptoms have been worsening since onset.  Treating symptoms with none.      Current Outpatient Medications   Medication Sig Dispense Refill    bisacodyl 5 MG Oral Tab EC Take 3 tablets (15 mg total) by mouth daily. 30 tablet 0    polyethylene glycol, PEG 3350, 17 g Oral Powd Pack Take 17 g by mouth in the morning and 17 g before bedtime. 60 packet 0    lamoTRIgine (LAMICTAL) 150 MG Oral Tab Take 1 tablet (150 mg total) by mouth daily. 7 tablet 0    FLUoxetine HCl 40 MG Oral Cap Take 1 capsule (40 mg total) by mouth daily. 7 capsule 0    Hyoscyamine Sulfate 0.125 MG Oral Tab Take 1 tablet (0.125 mg total) by mouth every 6 (six) hours as needed for Cramping.      potassium chloride 20 MEQ Oral Tab CR Take 1 tablet (20 mEq total) by mouth daily.      RENVELA 800 MG Oral Tab Take 1 tablet (800 mg total) by mouth.      losartan 100 MG Oral Tab Take 1 tablet (100 mg total) by mouth daily.      furosemide 80 MG Oral Tab Take 1 tablet (80 mg total) by mouth 2 (two) times daily.      hydrALAZINE 25 MG Oral Tab Take 1 tablet (25 mg total) by mouth in the morning and 1 tablet (25 mg total) before bedtime. 30 tablet 0    NIFEdipine ER 60 MG Oral Tablet 24 Hr Take 1 tablet (60 mg total) by mouth every morning.      NIFEdipine ER 30 MG Oral Tablet 24 Hr Take 1 tablet (30 mg total) by mouth at bedtime.      isosorbide mononitrate ER 60 MG Oral Tablet 24 Hr Take 1 tablet (60 mg total) by mouth daily. 30 tablet 0    cloNIDine 0.1  MG Oral Tab Take 1 tablet (0.1 mg total) by mouth 2 (two) times daily. 60 tablet 0    ergocalciferol 1.25 MG (90252 UT) Oral Cap Take 1 capsule (50,000 Units total) by mouth Every Monday.      carvedilol 25 MG Oral Tab Take 1 tablet (25 mg total) by mouth in the morning and 1 tablet (25 mg total) in the evening. Take with meals. 60 tablet 6    Tiotropium Bromide Monohydrate (SPIRIVA HANDIHALER) 18 MCG Inhalation Cap 1 capsule by inhaling the contents of the capsule using the HandiHaler device      albuterol 108 (90 Base) MCG/ACT Inhalation Aero Soln albuterol sulfate HFA 90 mcg/actuation aerosol inhaler   INHALE 1 TO 2 PUFFS BY MOUTH EVERY 4-6 HOURS AS NEEDED FOR COUGH OR WHEEZING      Fenofibrate 134 MG Oral Cap Take 1 capsule by mouth nightly. 90 capsule 1    TRULICITY 0.75 MG/0.5ML Subcutaneous Solution Pen-injector once a week. EVERY MONDAY      acetaminophen 500 MG Oral Tab Take 1 tablet (500 mg total) by mouth every 6 (six) hours as needed for Pain.      Fluticasone Propionate 50 MCG/ACT Nasal Suspension SPRAY ONCE INTO EACH NOSTRIL BID PRN 15.8 mL 0      Past Medical History:   Diagnosis Date    Asthma     Attention deficit hyperactivity disorder (ADHD)     Back problem     Bipolar 1 disorder ([facility])     Cancer ([facility])     CKD (chronic kidney disease) stage 3, GFR 30-59 ml/min ([facility])     Dr Meeks    Congestive heart disease ([facility])     COPD (chronic obstructive pulmonary disease) ([facility])     Coronary atherosclerosis     Deep vein thrombosis ([facility])     at age 17 R/T cast    Depression     Diabetes ([facility])     Essential hypertension 2014    3/21 echo: severe concentric LVH with normal EF and no MR or pHTN    Extrinsic asthma, unspecified     Heart attack ([facility])     2016- angiogram- no intervention    Heart valve disease     mitral valve repair in 1994/    High blood pressure     High cholesterol     History of mitral valve repair 12/2022    Hyperlipidemia     Low back pain     tight and stiff after sweeping and mopping     LVH (left ventricular hypertrophy)     Migraines     Mixed hyperlipidemia 2021     HDL 38 LDL 97 VLDL 57     Monoclonal gammopathy     IgG kappa     MVP (mitral valve prolapse)     Repair  at [facility]; echoes as recently as 3/21 show mild or trivial MR and no stenosis    Neuropathy     Osteoarthritis     hip ,knees    Pneumonia due to organism     Pulmonary embolism ([facility]) 2023    Renal disorder     TIA (transient ischemic attack) 2021    Initial history of left-sided weakness and slurred speech. (+) cocaine. MRI of the brain, CT angiogram of the head and neck, and 2D echo are all unremarkable.     TMJ (dislocation of temporomandibular joint)     Troponin level elevated 2021    Trop 60 60 47 with TIA and no CP: Lexiscan negative with EF 51      Past Surgical History:   Procedure Laterality Date    COLONOSCOPY N/A 2023    Procedure: COLONOSCOPY;  Surgeon: Heath Vu MD;  Location:  ENDOSCOPY    COLONOSCOPY N/A 2023    Procedure: COLONOSCOPY with cold snare polypectomy and forcep polypectomy;  Surgeon: Ousmane Suarez MD;  Location:  ENDOSCOPY    COLONOSCOPY & POLYPECTOMY  2019    EGD  2019    Duodenitis. Biopsied. EUS for weight loss was negative    HEART SURGERY      HERNIA SURGERY  2022    Dr Barnes    LAMINECTOMY,>2 SGMT,LUMBAR  2018    L4-L5 Decomp Discectomy ROEM L4-L5    MITRALPLASTY W CP BYPASS      [facility]: Repair    REPAIR ROTATOR CUFF,CHRONIC Left     torn and had a ruptured bicep    VALVE REPAIR      mitral valve         Social History     Socioeconomic History    Marital status:    Tobacco Use    Smoking status: Former     Packs/day: 1.00     Years: 27.00     Additional pack years: 0.00     Total pack years: 27.00     Types: Cigarettes     Quit date: 2022     Years since quittin.8    Smokeless tobacco: Never   Vaping Use    Vaping Use: Never used   Substance and Sexual Activity    Alcohol use: No    Drug use: No     Social  Determinants of Health     Food Insecurity: No Food Insecurity (12/28/2023)    Food Insecurity     Food Insecurity: Never true   Transportation Needs: No Transportation Needs (12/28/2023)    Transportation Needs     Lack of Transportation: No   Housing Stability: Low Risk  (12/28/2023)    Housing Stability     Housing Instability: No         REVIEW OF SYSTEMS:   GENERAL: feels well otherwise, good appetite  SKIN: no rashes or abnormal skin lesions  HEENT: See HPI  LUNGS: denies shortness of breath or wheezing, See HPI  CARDIOVASCULAR: denies chest pain or palpitations   GI: denies N/V/C or abdominal pain  NEURO: Denies headaches    EXAM:   BP 98/60   Pulse 99   Temp 98.5 °F (36.9 °C) (Oral)   Resp 16   Ht 6' 2\" (1.88 m)   Wt 250 lb (113.4 kg)   SpO2 98%   BMI 32.10 kg/m²   GENERAL: well developed, well nourished, in no apparent distress  SKIN: no rashes, no suspicious lesions  HEENT: atraumatic, normocephalic. conjunctiva clear. TM's gray, no bulging, no retraction, + fluid, bony landmarks intact. clear nasal discharge, nasal mucosa erythematous and swollen. Oral mucosa pink, moist. Posterior pharynx is not erythematous. no exudates. No sinus tenderness with palpation.   THROAT:NECK: Supple, non-tender  LUNGS: clear to auscultation   CARDIO: RRR without murmur  EXTREMITIES: no cyanosis, clubbing or edema  LYMP: bilateral anterior cervical lymphadenopathy.    ASSESSMENT AND PLAN:   Godwin Fonseca is a 43 year old male who presents with     Godwin was seen today for nasal congestion.    Diagnoses and all orders for this visit:    COVID-19 virus infection  -     Rapid Covid-19    Dizzy    SOB (shortness of breath)      Positive covid test.   Pt short of breath. Dizzy. Normal BP reported 190's. Symptomatic.   Refer to ER for labs, possible fluids, sob workup.     Accompanied by: friend will drive patient  After triage, a higher acuity of care was recommended to pt.  Discussed limitations of [place] and need for  further evaluation due to history of end stage renal, chf with +covid and patient dizzy and short of breath for further workup/labs, possible imaging.   Referred to: ER  Patient verbalized understanding of rationale for further evaluation and was stable upon discharge.  Declines medical transport.

## 2024-01-04 PROBLEM — I95.9 HYPOTENSION, UNSPECIFIED HYPOTENSION TYPE: Status: ACTIVE | Noted: 2024-01-04

## 2024-01-04 LAB
ATRIAL RATE: 87 BPM
CHOLEST SERPL-MCNC: 141 MG/DL (ref ?–200)
HDLC SERPL-MCNC: 40 MG/DL (ref 40–59)
LDLC SERPL CALC-MCNC: 57 MG/DL (ref ?–100)
NONHDLC SERPL-MCNC: 101 MG/DL (ref ?–130)
P AXIS: 36 DEGREES
P-R INTERVAL: 186 MS
Q-T INTERVAL: 424 MS
QRS DURATION: 106 MS
QTC CALCULATION (BEZET): 510 MS
R AXIS: 34 DEGREES
T AXIS: 58 DEGREES
TRIGL SERPL-MCNC: 281 MG/DL (ref 30–149)
VENTRICULAR RATE: 87 BPM
VLDLC SERPL CALC-MCNC: 41 MG/DL (ref 0–30)

## 2024-01-04 PROCEDURE — 90935 HEMODIALYSIS ONE EVALUATION: CPT | Performed by: STUDENT IN AN ORGANIZED HEALTH CARE EDUCATION/TRAINING PROGRAM

## 2024-01-04 PROCEDURE — 94640 AIRWAY INHALATION TREATMENT: CPT

## 2024-01-04 RX ORDER — HYDRALAZINE HYDROCHLORIDE 20 MG/ML
10 INJECTION INTRAMUSCULAR; INTRAVENOUS ONCE
Status: COMPLETED | OUTPATIENT
Start: 2024-01-04 | End: 2024-01-04

## 2024-01-04 RX ORDER — LAMOTRIGINE 150 MG/1
150 TABLET ORAL DAILY
Status: DISCONTINUED | OUTPATIENT
Start: 2024-01-04 | End: 2024-01-06

## 2024-01-04 RX ORDER — ACETAMINOPHEN 500 MG
TABLET ORAL
Status: COMPLETED
Start: 2024-01-04 | End: 2024-01-04

## 2024-01-04 RX ORDER — FLUOXETINE HYDROCHLORIDE 20 MG/1
40 CAPSULE ORAL DAILY
Status: DISCONTINUED | OUTPATIENT
Start: 2024-01-04 | End: 2024-01-06

## 2024-01-04 RX ORDER — LISDEXAMFETAMINE DIMESYLATE CAPSULES 60 MG/1
60 CAPSULE ORAL EVERY MORNING
COMMUNITY

## 2024-01-04 RX ORDER — TAMSULOSIN HYDROCHLORIDE 0.4 MG/1
0.4 CAPSULE ORAL DAILY
COMMUNITY

## 2024-01-04 RX ORDER — FENOFIBRATE 134 MG/1
134 CAPSULE ORAL NIGHTLY
Status: DISCONTINUED | OUTPATIENT
Start: 2024-01-04 | End: 2024-01-06

## 2024-01-04 RX ORDER — BUPROPION HYDROCHLORIDE 150 MG/1
150 TABLET ORAL DAILY
COMMUNITY

## 2024-01-04 RX ORDER — ARIPIPRAZOLE 15 MG/1
15 TABLET ORAL DAILY
COMMUNITY

## 2024-01-04 RX ORDER — HEPARIN SODIUM 1000 [USP'U]/ML
2000 INJECTION, SOLUTION INTRAVENOUS; SUBCUTANEOUS ONCE
Status: COMPLETED | OUTPATIENT
Start: 2024-01-04 | End: 2024-01-04

## 2024-01-04 RX ORDER — ACETAMINOPHEN 500 MG
500 TABLET ORAL EVERY 4 HOURS PRN
Status: DISCONTINUED | OUTPATIENT
Start: 2024-01-04 | End: 2024-01-06

## 2024-01-04 RX ORDER — ALBUTEROL SULFATE 90 UG/1
2 AEROSOL, METERED RESPIRATORY (INHALATION) EVERY 6 HOURS PRN
Status: DISCONTINUED | OUTPATIENT
Start: 2024-01-04 | End: 2024-01-06

## 2024-01-04 RX ORDER — ONDANSETRON 2 MG/ML
4 INJECTION INTRAMUSCULAR; INTRAVENOUS EVERY 6 HOURS PRN
Status: DISCONTINUED | OUTPATIENT
Start: 2024-01-04 | End: 2024-01-06

## 2024-01-04 RX ORDER — FLUTICASONE PROPIONATE 50 MCG
1 SPRAY, SUSPENSION (ML) NASAL DAILY
Status: DISCONTINUED | OUTPATIENT
Start: 2024-01-04 | End: 2024-01-06

## 2024-01-04 RX ORDER — HEPARIN SODIUM 5000 [USP'U]/ML
5000 INJECTION, SOLUTION INTRAVENOUS; SUBCUTANEOUS EVERY 8 HOURS SCHEDULED
Status: DISCONTINUED | OUTPATIENT
Start: 2024-01-04 | End: 2024-01-06

## 2024-01-04 RX ORDER — PROCHLORPERAZINE EDISYLATE 5 MG/ML
5 INJECTION INTRAMUSCULAR; INTRAVENOUS EVERY 8 HOURS PRN
Status: DISCONTINUED | OUTPATIENT
Start: 2024-01-04 | End: 2024-01-06

## 2024-01-04 RX ORDER — ALBUMIN (HUMAN) 12.5 G/50ML
25 SOLUTION INTRAVENOUS
Status: ACTIVE | OUTPATIENT
Start: 2024-01-04 | End: 2024-01-05

## 2024-01-04 NOTE — ED QUICK NOTES
Spoke with [facility] nursing supervisor, states no beds available at this time. Hospital is at peak census.   Dr. Bob notified.

## 2024-01-04 NOTE — ED INITIAL ASSESSMENT (HPI)
Pt states he was seen at [facility] today for body aches, body aches, fever, chills, sore throat and tested positive for covid. hospitals was sent here for low bp of 90/50

## 2024-01-04 NOTE — ED QUICK NOTES
Orders for admission, patient is aware of plan and ready to go upstairs. Any questions, please call ED RN Jennifer at extension [phone].     Patient Covid vaccination status: Fully vaccinated     COVID Test Ordered in ED: None    COVID Suspicion at Admission: N/A    Running Infusions:  None    Mental Status/LOC at time of transport: alert and oriented x4    Other pertinent information: Covid Positive. Never Hypotensive while in ED.   CIWA score: N/A   NIH score:  N/A

## 2024-01-04 NOTE — DISCHARGE SUMMARY
[facility] Hospitalist Discharge Summary     Patient ID:  Godwin Fonseca  43 year old  4/12/1980    Admit date: 1/3/2024    Discharge date and time: 01/06/24     Attending Physician: Karen Hall*     Primary Care Physician: Prieto Novak MD     Discharge Diagnoses: Hypotension, unspecified hypotension type [I95.9]  COVID-19 virus infection [U07.1]    Please note that only [facility] DMG and EMG patients enrolled in the Medicare ACO, [facility] ACO and [facility] HMOs will be handled by the [facility] Care Management team.  For all other patients, please follow usual protocol for discharge care transition.    Discharge Condition: stable    Disposition:  home    Important Follow up:  - PCP within 2 weeks       Follow-up Information       Prieto Novak MD Follow up in 2 week(s).    Specialty: Internal Medicine  Why: Follow up  Contact information:  [address]  [address]  [facility] [address]  [phone]                                 Hospital Course:        43 year old male with PMH sig for end-stage renal disease on HD, HFpEF, diabetes mellitus type 2, COPD, chronic abdominal pain and depression/bipolar/ADHD with multiple ER/hospital admissions presented with low blood pressure.     Hypovolemic Hypotension in setting of COVID URI  Hx Essential HTN  - BP's now stabilized (normal) although hasn't taken any antiHTN's this AM  - s/p gentle IVF bolus  - encourage ambulation to improve BP  - hold home coreg, losartan, hydralazine, nifedipine, imdur until BP's improve - ok to resume on dc   - no indication for steroid therapy for COVID - supportive cares, mucinex/tessalon dhillon, albuterol. No paxlovid because of esrd      Elevated troponin  - in setting of ESRD  - chronically elevated  - no CP/EKG changes  - no suspicion for ACS     ESRD on HD  - HD per nephrology     HFpEF - compensated, holding meds for low BP   DM type 2 - ISS/accucheks  COPD -  incruse  Hyperlipidemia - lofibra   Depression/bipolar/ADHD - resume home meds prozac, lamictal      Consults: IP CONSULT TO NEPHROLOGY  IP CONSULT TO HOSPITALIST  IP CONSULT TO NEPHROLOGY  IP CONSULT TO NEPHROLOGY    Operative Procedures:        Patient instructions:      I as the attending physician reconciled the current and discharge medications on day of discharge.     Current Discharge Medication List        START taking these medications    Details   benzonatate 100 MG Oral Cap Take 1 capsule (100 mg total) by mouth 3 (three) times daily as needed for cough.           CONTINUE these medications which have NOT CHANGED    Details   tamsulosin 0.4 MG Oral Cap Take 1 capsule (0.4 mg total) by mouth daily.      Lisdexamfetamine Dimesylate 60 MG Oral Cap Take 1 capsule (60 mg total) by mouth every morning.      buPROPion  MG Oral Tablet 24 Hr Take 1 tablet (150 mg total) by mouth daily.      ARIPiprazole 15 MG Oral Tab Take 1 tablet (15 mg total) by mouth daily.      bisacodyl 5 MG Oral Tab EC Take 3 tablets (15 mg total) by mouth daily.      polyethylene glycol, PEG 3350, 17 g Oral Powd Pack Take 17 g by mouth in the morning and 17 g before bedtime.      lamoTRIgine (LAMICTAL) 150 MG Oral Tab Take 1 tablet (150 mg total) by mouth daily.      FLUoxetine HCl 40 MG Oral Cap Take 1 capsule (40 mg total) by mouth daily.      Hyoscyamine Sulfate 0.125 MG Oral Tab Take 1 tablet (0.125 mg total) by mouth every 6 (six) hours as needed for Cramping.      potassium chloride 20 MEQ Oral Tab CR Take 1 tablet (20 mEq total) by mouth daily.      RENVELA 800 MG Oral Tab Take 1 tablet (800 mg total) by mouth 3 (three) times daily with meals.      losartan 100 MG Oral Tab Take 1 tablet (100 mg total) by mouth daily.      furosemide 80 MG Oral Tab Take 1 tablet (80 mg total) by mouth 2 (two) times daily.      hydrALAZINE 25 MG Oral Tab Take 1 tablet (25 mg total) by mouth in the morning and 1 tablet (25 mg total) before  bedtime.      !! NIFEdipine ER 60 MG Oral Tablet 24 Hr Take 1 tablet (60 mg total) by mouth every morning.      !! NIFEdipine ER 30 MG Oral Tablet 24 Hr Take 1 tablet (30 mg total) by mouth at bedtime.      isosorbide mononitrate ER 60 MG Oral Tablet 24 Hr Take 1 tablet (60 mg total) by mouth daily.      cloNIDine 0.1 MG Oral Tab Take 1 tablet (0.1 mg total) by mouth 2 (two) times daily.      ergocalciferol 1.25 MG (61202 UT) Oral Cap Take 1 capsule (50,000 Units total) by mouth Every Monday.      carvedilol 25 MG Oral Tab Take 1 tablet (25 mg total) by mouth in the morning and 1 tablet (25 mg total) in the evening. Take with meals.      Tiotropium Bromide Monohydrate (SPIRIVA HANDIHALER) 18 MCG Inhalation Cap 1 capsule (18 mcg total) daily.      albuterol 108 (90 Base) MCG/ACT Inhalation Aero Soln albuterol sulfate HFA 90 mcg/actuation aerosol inhaler   INHALE 1 TO 2 PUFFS BY MOUTH EVERY 4-6 HOURS AS NEEDED FOR COUGH OR WHEEZING      Fenofibrate 134 MG Oral Cap Take 1 capsule by mouth nightly.      TRULICITY 0.75 MG/0.5ML Subcutaneous Solution Pen-injector once a week. EVERY MONDAY      acetaminophen 500 MG Oral Tab Take 1 tablet (500 mg total) by mouth every 6 (six) hours as needed for Pain.      Fluticasone Propionate 50 MCG/ACT Nasal Suspension SPRAY ONCE INTO EACH NOSTRIL BID PRN       !! - Potential duplicate medications found. Please discuss with provider.          Activity: activity as tolerated  Diet: regular diet  Wound Care: as directed  Code Status: Full Code      Discharge Exam:     General: no acute distress, alert and oriented x 3  Heart: RRR  Lungs: clear bilaterally, no active wheezing  Abdomen: nontender, nondistended, intact BS  Extremities: no pedal edema   Neuro: CN inact, no focal deficits      Total time coordinating care for discharge: Greater than 30 minutes    Eder Hall MD  [facility] Hospitalist

## 2024-01-04 NOTE — PLAN OF CARE
Alert and oriented, feels tired and has no appetite, experiencing headache that radiates to his neck and shoulder, given Tylenol with very little relief.  Lungs are clear and diminished on basis, has dry cough, no hypoxia on RA, on Covid isolation.  Seen by nephrology, awaiting for HD treatment, possible discharge after dialysis.   1830 HD was completed, patient still feeling lousy,still has a headache, taking Tylenol and warm packs, states he in no condition to go home tonight, he feels weak and has not been able to get up to ambulate today.  MD was notified.             Problem: RESPIRATORY - ADULT  Goal: Achieves optimal ventilation and oxygenation  Description: INTERVENTIONS:  - Assess for changes in respiratory status  - Assess for changes in mentation and behavior  - Position to facilitate oxygenation and minimize respiratory effort  - Oxygen supplementation based on oxygen saturation or ABGs  - Provide Smoking Cessation handout, if applicable  - Encourage broncho-pulmonary hygiene including cough, deep breathe, Incentive Spirometry  - Assess the need for suctioning and perform as needed  - Assess and instruct to report SOB or any respiratory difficulty  - Respiratory Therapy support as indicated  - Manage/alleviate anxiety  - Monitor for signs/symptoms of CO2 retention  Outcome: Progressing     Problem: PAIN - ADULT  Goal: Verbalizes/displays adequate comfort level or patient's stated pain goal  Description: INTERVENTIONS:  - Encourage pt to monitor pain and request assistance  - Assess pain using appropriate pain scale  - Administer analgesics based on type and severity of pain and evaluate response  - Implement non-pharmacological measures as appropriate and evaluate response  - Consider cultural and social influences on pain and pain management  - Manage/alleviate anxiety  - Utilize distraction and/or relaxation techniques  - Monitor for opioid side effects  - Notify MD/LIP if interventions unsuccessful or  patient reports new pain  - Anticipate increased pain with activity and pre-medicate as appropriate  Outcome: Not Progressing

## 2024-01-04 NOTE — PROGRESS NOTES
NURSING ADMISSION NOTE      Patient admitted via Cart  Oriented to room.  Safety precautions initiated.  Bed in low position.  Call light in reach.  Received from [place]. Placed on Tele and . Vitals stable. Room air. Dry cough. Complained of body aches will give tylenol.  Dr Page notified of consult

## 2024-01-04 NOTE — ED PROVIDER NOTES
Patient Seen in: [facility] In [place]      History     Chief Complaint   Patient presents with    Hypotension    Covid    Dizziness     Stated Complaint: went to walk in clinic this morning and tested pos for covid, hypotensive, feel*    Subjective:   HPI    43-year-old -American male well-known to this emergency room for multiple visits presents to the emergency room today for complaint of low blood pressure.  Patient states he was seeing his nephrologist today at his office and they noticed his blood pressure as well as they told him to go to the walk-in clinic.  Patient states he went to the walk-in clinic his blood pressure was low there.  They tested him for COVID and apparently COVID-positive.  Patient states for the last few days has been having fever chills myalgias and arthralgias and generalized malaise.  He reports no vomiting or diarrhea.  He reports his blood pressure was low after dialysis yesterday as well.  Patient reports he gets dialysis Tuesday Thursdays and Saturdays.    Objective:   Past Medical History:   Diagnosis Date    Asthma     Attention deficit hyperactivity disorder (ADHD)     Back problem     Bipolar 1 disorder ([facility])     Cancer ([facility])     CKD (chronic kidney disease) stage 3, GFR 30-59 ml/min ([facility])     Dr Meeks    Congestive heart disease ([facility])     COPD (chronic obstructive pulmonary disease) ([facility])     Coronary atherosclerosis     Deep vein thrombosis ([facility])     at age 17 R/T cast    Depression     Diabetes ([facility])     Essential hypertension 2014    3/21 echo: severe concentric LVH with normal EF and no MR or pHTN    Extrinsic asthma, unspecified     Heart attack ([facility])     2016- angiogram- no intervention    Heart valve disease     mitral valve repair in 1994/    High blood pressure     High cholesterol     History of mitral valve repair 12/2022    Hyperlipidemia     Low back pain     tight and stiff after sweeping and mopping    LVH (left ventricular  hypertrophy)     Migraines     Mixed hyperlipidemia 2021     HDL 38 LDL 97 VLDL 57     Monoclonal gammopathy     IgG kappa     MVP (mitral valve prolapse)     Repair  at [place]; echoes as recently as 3/21 show mild or trivial MR and no stenosis    Neuropathy     Osteoarthritis     hip ,knees    Pneumonia due to organism     Pulmonary embolism (HCC) 2023    Renal disorder     TIA (transient ischemic attack) 2021    Initial history of left-sided weakness and slurred speech. (+) cocaine. MRI of the brain, CT angiogram of the head and neck, and 2D echo are all unremarkable.     TMJ (dislocation of temporomandibular joint)     Troponin level elevated 2021    Trop 60 60 47 with TIA and no CP: Lexiscan negative with EF 51              Past Surgical History:   Procedure Laterality Date    COLONOSCOPY N/A 2023    Procedure: COLONOSCOPY;  Surgeon: Heath Vu MD;  Location:  ENDOSCOPY    COLONOSCOPY N/A 2023    Procedure: COLONOSCOPY with cold snare polypectomy and forcep polypectomy;  Surgeon: Ousmane Suarez MD;  Location:  ENDOSCOPY    COLONOSCOPY & POLYPECTOMY  2019    EGD  2019    Duodenitis. Biopsied. EUS for weight loss was negative    HEART SURGERY      HERNIA SURGERY  2022    Dr Barnes    LAMINECTOMY,>2 SGMT,LUMBAR  2018    L4-L5 Decomp Discectomy ROEM L4-L5    MITRALPLASTY W CP BYPASS      [place]: Repair    REPAIR ROTATOR CUFF,CHRONIC Left     torn and had a ruptured bicep    VALVE REPAIR      mitral valve                Social History     Socioeconomic History    Marital status:    Tobacco Use    Smoking status: Former     Packs/day: 1.00     Years: 27.00     Additional pack years: 0.00     Total pack years: 27.00     Types: Cigarettes     Quit date: 2022     Years since quittin.8    Smokeless tobacco: Never   Vaping Use    Vaping Use: Never used   Substance and Sexual Activity    Alcohol use: No    Drug use: No     Social  Determinants of Health     Food Insecurity: No Food Insecurity (12/28/2023)    Food Insecurity     Food Insecurity: Never true   Transportation Needs: No Transportation Needs (12/28/2023)    Transportation Needs     Lack of Transportation: No   Housing Stability: Low Risk  (12/28/2023)    Housing Stability     Housing Instability: No              Review of Systems    Positive for stated complaint: went to walk in clinic this morning and tested pos for covid, hypotensive, feel*  Other systems are as noted in HPI.  Constitutional and vital signs reviewed.      All other systems reviewed and negative except as noted above.    Physical Exam     ED Triage Vitals   BP 01/03/24 1856 103/59   Pulse 01/03/24 1856 94   Resp 01/03/24 1856 18   Temp 01/03/24 1856 98.7 °F (37.1 °C)   Temp src 01/03/24 2017 Oral   SpO2 01/03/24 1856 97 %   O2 Device 01/03/24 1856 None (Room air)       Current:/84   Pulse 93   Temp 99.6 °F (37.6 °C) (Oral)   Resp 16   Ht 188 cm (6' 2\")   Wt 120.2 kg   SpO2 98%   BMI 34.02 kg/m²         Physical Exam    Well-developed well-nourished male who is sitting on the gurney, he is awake and alert and appears in no acute discomfort or distress.    Vital signs are stable he is afebrile    Head is normocephalic atraumatic conjunctiva is clear.  Sclerae anicteric.  Neck is supple.    Lungs are clear to auscultation bilaterally.  Heart is regular rate and rhythm without murmur gallop or rub    Abdomen is soft nondistended nontender to deep palpation there is no rebound or guarding noted no hepatosplenomegaly is noted.  No masses are noted.  No hernias are palpated.    Extremity exam reveals no clubbing cyanosis or edema.  Patient has good radial pulses noted bilaterally in the upper extremities .    There are no rashes noted on skin exam.      ED Course     Labs Reviewed   COMP METABOLIC PANEL (14) - Abnormal; Notable for the following components:       Result Value    Glucose 103 (*)     BUN 30  (*)     Creatinine 7.18 (*)     Calcium, Total 7.2 (*)     eGFR-Cr 9 (*)     AST 45 (*)     A/G Ratio 0.9 (*)     All other components within normal limits   URINALYSIS WITH CULTURE REFLEX - Abnormal; Notable for the following components:    Ketones Urine Trace (*)     Protein Urine >=300 (*)     All other components within normal limits   TROPONIN I HIGH SENSITIVITY - Abnormal; Notable for the following components:    Troponin I (High Sensitivity) 275 (*)     All other components within normal limits   UA MICROSCOPIC ONLY, URINE - Abnormal; Notable for the following components:    WBC Urine 6-10 (*)     Bacteria Urine Rare (*)     Squamous Epi. Cells Few (*)     All other components within normal limits   CBC W/ DIFFERENTIAL - Abnormal; Notable for the following components:    RBC 3.05 (*)     HGB 9.4 (*)     HCT 27.9 (*)     Lymphocyte Absolute 0.95 (*)     All other components within normal limits   LACTIC ACID, PLASMA - Normal   CBC WITH DIFFERENTIAL WITH PLATELET    Narrative:     The following orders were created for panel order CBC With Differential With Platelet.  Procedure                               Abnormality         Status                     ---------                               -----------         ------                     CBC W/ DIFFERENTIAL[[id number]]          Abnormal            Final result                 Please view results for these tests on the individual orders.   LIPID PANEL   BLOOD CULTURE   BLOOD CULTURE     EKG    Rate, intervals and axes as noted on EKG Report.  Rate: 87 bpm  Rhythm: Sinus Rhythm  Reading: Normal sinus rhythm with voltage criteria for LVH.  Diffuse T wave flattening is noted and there is slightly prolonged QT interval.  When compared to EKG dated 12/28/2023 no significant changes noted.                 Chest x-ray reveals:    FINDINGS:    Tunneled right hemodialysis catheter in place.  Normal heart size with valve prosthesis.  Normal pulmonary vascularity.  Right  pleural effusion and right basilar pleural parenchymal opacity appear improved.  No new pulmonary opacity.                  Impression  CONCLUSION:  No acute cardiopulmonary pathology.  Decreased right pleural effusion and right basilar opacity.         MDM      This is a patient who had low blood pressure at his nephrologist office today was then sent to an urgent care.  There his blood pressure was also low.  He tested positive for COVID there.  Differential for this patient's low blood pressure is that they took too much off his dialysis yesterday.  Also could have infectious process going on.  Will send labs off on the patient.  Will give him a small dose of normal saline here.  Will monitor him here.  Admission disposition: 1/3/2024 10:25 PM         Patient had an IV established in the emergency room laboratory workup revealed a CBC shows a hemoglobin 9.4 this is only slightly lower than previous at 10.2 on 30 December.  Chemistries reveal that labs are consistent with his renal failure with a BUN of 30 creatinine 7.18.  Urinalysis is generally unremarkable there is only 6-10 WBCs Parpart field and a few squamous epithelial cells might be contaminated.  Patient's EKG showed no ischemic changes his troponin was elevated to 75 but reviewing his troponins they seem to be always elevated probably related to his renal failure.  Chest x-ray which I reviewed myself shows no infiltrate but does show his chronic right pleural effusion which are improved overall.  Lactic acid is negative.  Patient's blood pressure improved after small amount of IV fluids.  I talked to the patient's nephrologist from [facility] we discussed the case.  They agreed patient.  Patient be admitted and observed and then dialyze tomorrow.  She contacted the nursing supervisor there they had no beds.  Patient be admitted to [facility].  I spoke with the [facility]ist they agreed admit the patient primarily.  Nephrology will be  consulted as well.  Patient will be admitted to medical floor for further workup treatment and evaluation for his transient hypotension.  Patient not get large amount of fluids due to his renal failure and these will be need to be monitored by the hospitalist and the nephrologist.                               Medical Decision Making      Disposition and Plan     Clinical Impression:  1. COVID-19 virus infection    2. Hypotension, unspecified hypotension type         Disposition:  Admit  1/3/2024 10:25 pm    Follow-up:  No follow-up provider specified.        Medications Prescribed:  Current Discharge Medication List                            Hospital Problems       Present on Admission  Date Reviewed: 1/3/2024            ICD-10-CM Noted POA    * (Principal) COVID-19 virus infection U07.1 1/3/2024 Unknown

## 2024-01-04 NOTE — CONSULTS
[facility] - Nephrology  Inpatient Consultation    Godwin Fonseca Patient Status:  Observation    1980 MRN [id number]   Location [facility] 4NW-A Attending Karen Hall*   Hosp Day # 0 PCP Prieto Novak MD     Reason for consult: ESRD on HD  Date of consultation: 2024     HISTORY OF PRESENT ILLNESS:     Godwin Fonseca is a 43 year old male with a medical history notable for ESRD on HD TTS, hypertension, diabetes, who presents with COVID. Nephrology consulted for HD management.    Patient recently admitted for rectal bleeding. Discharged home and noticed to have fevers, chills, body aches. Noticed to have low blood pressure and advised for further workup. Found to be COVID positive. Most recent dialysis on Tuesday per his TTS schedule. Admitted for further workup and management.    REVIEW OF SYSTEMS:     ROS as above, otherwise negative    HISTORY:     Past Medical History:   Diagnosis Date    Asthma     Attention deficit hyperactivity disorder (ADHD)     Back problem     Bipolar 1 disorder (HCC)     Cancer (HCC)     CKD (chronic kidney disease) stage 3, GFR 30-59 ml/min (HCC)     Dr Meeks    Congestive heart disease (HCC)     COPD (chronic obstructive pulmonary disease) (HCC)     Coronary atherosclerosis     Deep vein thrombosis (HCC)     at age 17 R/T cast    Depression     Diabetes (HCC)     Essential hypertension 2014    3/21 echo: severe concentric LVH with normal EF and no MR or pHTN    Extrinsic asthma, unspecified     Heart attack (HCC)     - angiogram- no intervention    Heart valve disease     mitral valve repair in /    High blood pressure     High cholesterol     History of mitral valve repair 2022    Hyperlipidemia     Low back pain     tight and stiff after sweeping and mopping    LVH (left ventricular hypertrophy)     Migraines     Mixed hyperlipidemia 2021     HDL 38 LDL 97 VLDL 57     Monoclonal gammopathy     IgG kappa     MVP (mitral  valve prolapse)     Repair 1994 at [place]; echoes as recently as 3/21 show mild or trivial MR and no stenosis    Neuropathy     Osteoarthritis     hip ,knees    Pneumonia due to organism     Pulmonary embolism (HCC) 01/2023    Renal disorder     TIA (transient ischemic attack) 03/2021    Initial history of left-sided weakness and slurred speech. (+) cocaine. MRI of the brain, CT angiogram of the head and neck, and 2D echo are all unremarkable.     TMJ (dislocation of temporomandibular joint)     Troponin level elevated 03/2021    Trop 60 60 47 with TIA and no CP: Lexiscan negative with EF 51     Past Surgical History:   Procedure Laterality Date    COLONOSCOPY N/A 03/26/2023    Procedure: COLONOSCOPY;  Surgeon: Heath Vu MD;  Location:  ENDOSCOPY    COLONOSCOPY N/A 12/30/2023    Procedure: COLONOSCOPY with cold snare polypectomy and forcep polypectomy;  Surgeon: Ousmane Suarez MD;  Location:  ENDOSCOPY    COLONOSCOPY & POLYPECTOMY  2019    EGD  2019    Duodenitis. Biopsied. EUS for weight loss was negative    HEART SURGERY      HERNIA SURGERY  08/17/2022    Dr Barnes    LAMINECTOMY,>2 SGMT,LUMBAR  09/06/2018    L4-L5 Decomp Discectomy ROEM L4-L5    MITRALPLASTY W CP BYPASS  1994    [place]: Repair    REPAIR ROTATOR CUFF,CHRONIC Left     torn and had a ruptured bicep    VALVE REPAIR  1994    mitral valve     Family History   Problem Relation Age of Onset    Hypertension Father     Alcohol and Other Disorders Associated Father     Substance Abuse Father         cocaine    Dementia Father     Cancer Father         lung    Diabetes Mother     Cancer Mother         multiple myeloma    Hypertension Mother     Anxiety Maternal Aunt     Depression Maternal Aunt     Anxiety Maternal Aunt     Depression Maternal Aunt     Bipolar Disorder Maternal Aunt     Diabetes Maternal Grandmother     Hypertension Maternal Grandmother     Cancer Maternal Grandfather         stomach cancer    Diabetes Maternal Grandfather      Hypertension Maternal Grandfather     Alcohol and Other Disorders Associated Maternal Grandfather     Hypertension Paternal Grandmother     Hypertension Paternal Grandfather     Cancer Sister         uterine and ovarian    Hypertension Sister     Cancer Maternal Uncle         lung    Cancer Paternal Aunt         throat      reports that he quit smoking about 22 months ago. His smoking use included cigarettes. He has a 27.00 pack-year smoking history. He has never used smokeless tobacco. He reports that he does not drink alcohol and does not use drugs.  Allergies   Allergen Reactions    Hydrochlorothiazide RASH and HIVES       MEDICATIONS:       Current Facility-Administered Medications:     albuterol (Ventolin HFA) 108 (90 Base) MCG/ACT inhaler 2 puff, 2 puff, Inhalation, Q6H PRN    fenofibrate micronized (Lofibra) cap 134 mg, 134 mg, Oral, Nightly    FLUoxetine (PROzac) cap 40 mg, 40 mg, Oral, Daily    umeclidinium bromide (Incruse Ellipta) 62.5 MCG/ACT inhaler 1 puff, 1 puff, Inhalation, Daily    lamoTRIgine (LaMICtal) tab 150 mg, 150 mg, Oral, Daily    heparin (Porcine) 5000 UNIT/ML injection 5,000 Units, 5,000 Units, Subcutaneous, Q8H MARTHA    acetaminophen (Tylenol Extra Strength) tab 500 mg, 500 mg, Oral, Q4H PRN    ondansetron (Zofran) 4 MG/2ML injection 4 mg, 4 mg, Intravenous, Q6H PRN    prochlorperazine (Compazine) 10 MG/2ML injection 5 mg, 5 mg, Intravenous, Q8H PRN    sodium chloride 0.9 % IV bolus 100 mL, 100 mL, Intravenous, Q30 Min PRN **AND** albumin human (Albumin) 25% injection 25 g, 25 g, Intravenous, PRN Dialysis    Medications Prior to Admission   Medication Sig Dispense Refill Last Dose    tamsulosin 0.4 MG Oral Cap Take 1 capsule (0.4 mg total) by mouth daily.       Lisdexamfetamine Dimesylate 60 MG Oral Cap Take 1 capsule (60 mg total) by mouth every morning.       buPROPion  MG Oral Tablet 24 Hr Take 1 tablet (150 mg total) by mouth daily.       ARIPiprazole 15 MG Oral Tab Take 1 tablet  (15 mg total) by mouth daily.       bisacodyl 5 MG Oral Tab EC Take 3 tablets (15 mg total) by mouth daily. 30 tablet 0 1/3/2024    polyethylene glycol, PEG 3350, 17 g Oral Powd Pack Take 17 g by mouth in the morning and 17 g before bedtime. 60 packet 0 1/3/2024    lamoTRIgine (LAMICTAL) 150 MG Oral Tab Take 1 tablet (150 mg total) by mouth daily. 7 tablet 0 1/3/2024    FLUoxetine HCl 40 MG Oral Cap Take 1 capsule (40 mg total) by mouth daily. 7 capsule 0 1/3/2024 at 0900    Hyoscyamine Sulfate 0.125 MG Oral Tab Take 1 tablet (0.125 mg total) by mouth every 6 (six) hours as needed for Cramping.   1/3/2024    potassium chloride 20 MEQ Oral Tab CR Take 1 tablet (20 mEq total) by mouth daily.   1/3/2024    RENVELA 800 MG Oral Tab Take 1 tablet (800 mg total) by mouth 3 (three) times daily with meals.   1/3/2024    losartan 100 MG Oral Tab Take 1 tablet (100 mg total) by mouth daily.   1/3/2024    furosemide 80 MG Oral Tab Take 1 tablet (80 mg total) by mouth 2 (two) times daily.   1/3/2024    hydrALAZINE 25 MG Oral Tab Take 1 tablet (25 mg total) by mouth in the morning and 1 tablet (25 mg total) before bedtime. 30 tablet 0 1/3/2024    NIFEdipine ER 60 MG Oral Tablet 24 Hr Take 1 tablet (60 mg total) by mouth every morning.   1/3/2024    NIFEdipine ER 30 MG Oral Tablet 24 Hr Take 1 tablet (30 mg total) by mouth at bedtime.   1/3/2024    isosorbide mononitrate ER 60 MG Oral Tablet 24 Hr Take 1 tablet (60 mg total) by mouth daily. 30 tablet 0 1/3/2024    cloNIDine 0.1 MG Oral Tab Take 1 tablet (0.1 mg total) by mouth 2 (two) times daily. 60 tablet 0 1/3/2024    ergocalciferol 1.25 MG (64327 UT) Oral Cap Take 1 capsule (50,000 Units total) by mouth Every Monday.   1/3/2024    carvedilol 25 MG Oral Tab Take 1 tablet (25 mg total) by mouth in the morning and 1 tablet (25 mg total) in the evening. Take with meals. 60 tablet 6 1/3/2024    Tiotropium Bromide Monohydrate (SPIRIVA HANDIHALER) 18 MCG Inhalation Cap 1 capsule (18  mcg total) daily.   1/3/2024    albuterol 108 (90 Base) MCG/ACT Inhalation Aero Soln albuterol sulfate HFA 90 mcg/actuation aerosol inhaler   INHALE 1 TO 2 PUFFS BY MOUTH EVERY 4-6 HOURS AS NEEDED FOR COUGH OR WHEEZING   1/3/2024    Fenofibrate 134 MG Oral Cap Take 1 capsule by mouth nightly. 90 capsule 1 1/3/2024    TRULICITY 0.75 MG/0.5ML Subcutaneous Solution Pen-injector once a week. EVERY MONDAY   Past Week    acetaminophen 500 MG Oral Tab Take 1 tablet (500 mg total) by mouth every 6 (six) hours as needed for Pain.   Past Week    Fluticasone Propionate 50 MCG/ACT Nasal Suspension SPRAY ONCE INTO EACH NOSTRIL BID PRN 15.8 mL 0 1/3/2024        PHYSICAL EXAM:     Vital Signs: /81 (BP Location: Left arm)   Pulse 92   Temp 98.5 °F (36.9 °C) (Oral)   Resp 22   Ht 6' 2\" (1.88 m)   Wt 258 lb 6.4 oz (117.2 kg)   SpO2 97%   BMI 33.18 kg/m²   Temp (24hrs), Av.9 °F (37.2 °C), Min:98.5 °F (36.9 °C), Max:99.6 °F (37.6 °C)       Intake/Output Summary (Last 24 hours) at 2024 1351  Last data filed at 2024 0700  Gross per 24 hour   Intake 250 ml   Output 800 ml   Net -550 ml     Wt Readings from Last 3 Encounters:   24 258 lb 6.4 oz (117.2 kg)   24 250 lb (113.4 kg)   23 254 lb 13.6 oz (115.6 kg)       General: no acute distress  HEENT: normocephalic, atraumatic  CV: RRR  Respiratory: no respiratory distress  Abdomen: soft, non-tender  Extremities: no edema bilaterally  Skin: no rashes on visible skin  Neuro: alert and oriented x3    LABORATORY DATA:     Lab Results   Component Value Date     (H) 2024    BUN 30 (H) 2024    BUNCREA 13.0 2022    CREATSERUM 7.18 (H) 2024    ANIONGAP 8 2024    GFR 59 (L) 2018    GFRNAA 30 (L) 2022    GFRAA 35 (L) 2022    CA 7.2 (L) 2024    OSMOCALC 294 2024    ALKPHO 110 2024    AST 45 (H) 2024    ALT 61 2024    BILT 0.3 2024    TP 7.3 2024    ALB 3.4  01/03/2024    GLOBULIN 3.9 01/03/2024     01/03/2024    K 3.8 01/03/2024     01/03/2024    CO2 25.0 01/03/2024     Lab Results   Component Value Date    WBC 4.6 01/03/2024    RBC 3.05 (L) 01/03/2024    HGB 9.4 (L) 01/03/2024    HCT 27.9 (L) 01/03/2024    .0 01/03/2024    MPV 11.5 12/14/2012    MCV 91.5 01/03/2024    MCH 30.8 01/03/2024    MCHC 33.7 01/03/2024    RDW 12.6 01/03/2024    NEPRELIM 2.56 01/03/2024    NEPERCENT 55.2 01/03/2024    LYPERCENT 20.5 01/03/2024    MOPERCENT 21.1 01/03/2024    EOPERCENT 1.7 01/03/2024    BAPERCENT 1.3 01/03/2024    NE 2.56 01/03/2024    LYMABS 0.95 (L) 01/03/2024    MOABSO 0.98 01/03/2024    EOABSO 0.08 01/03/2024    BAABSO 0.06 01/03/2024     Lab Results   Component Value Date    MALBP 167.00 05/24/2023    CREUR 142.00 05/24/2023    CREUR 142.00 05/24/2023     Lab Results   Component Value Date    COLORUR Yellow 01/03/2024    CLARITY Clear 01/03/2024    SPECGRAVITY 1.025 01/03/2024    GLUUR Negative 01/03/2024    BILUR Negative 01/03/2024    KETUR Trace (A) 01/03/2024    BLOODURINE Negative 01/03/2024    PHURINE 5.5 01/03/2024    PROUR >=300 (A) 01/03/2024    UROBILINOGEN 0.2 01/03/2024    NITRITE Negative 01/03/2024    LEUUR Negative 01/03/2024    WBCUR 6-10 (A) 01/03/2024    RBCUR 0-2 01/03/2024    EPIUR Few (A) 01/03/2024    BACUR Rare (A) 01/03/2024    CAOXUR Occasional (A) 04/20/2018    HYLUR Present (A) 05/14/2019       IMAGING:     Reviewed    ASSESSMENT:      # ESRD  -HD TTS  -[facility]  -Access: [employer]     # Hypertension/Volume status  - lbs  -Home medications: carvedilol, isosorbide, hydralazine, losartan, nifedipine, clonidine, furosemide     # Anemia of ESRD  # Acute blood loss anemia     # Secondary hyperparathyroidism     PLAN:      -HD TTS while inpatient  -UF with HD as tolerated  -Continue home BP medications  -Continue sevelamer 800mg TID  -Avoid nephrotoxins and renally dose medications for creatinine clearance  -Monitor  intake and output daily  -Daily weights         Okay for discharge from nephrology perspective after HD.    Thank you for allowing us to participate in the care of this patient.         Samantha Muñoz, DO  [facility] - Nephrology

## 2024-01-04 NOTE — H&P
[facility] History and Physical      Chief Complaint   Patient presents with    Hypotension    Covid    Dizziness        PCP: Prieto Novak MD      History of Present Illness: Patient is a 43 year old male with PMH sig for end-stage renal disease on HD, HFpEF, diabetes mellitus type 2, COPD, chronic abdominal pain and depression/bipolar/ADHD with multiple ER/hospital admissions presented with low blood pressure.  He was in nephrology clinic today where his pressures were in the 80s and he was sent into the walk-in clinic where he ultimately tested COVID-positive.  He is also been reporting myalgias, arthralgias, generalized malaise along with low-grade subjective fevers and chills.  His last dialysis was on Tuesday per schedule.  In the ER, his vitals were stable.  Labs at baseline including a chronically elevated troponin.  Chronic anemia.  Chest x-ray unchanged from prior with a decreased right pleural effusion.  Given a small bolus of IV fluids, nephrology consulted and admitted for further evaluation and treatment.    Past Medical History:   Diagnosis Date    Asthma     Attention deficit hyperactivity disorder (ADHD)     Back problem     Bipolar 1 disorder ([facility])     Cancer ([facility])     CKD (chronic kidney disease) stage 3, GFR 30-59 ml/min ([facility])     Dr Meeks    Congestive heart disease ([facility])     COPD (chronic obstructive pulmonary disease) ([facility])     Coronary atherosclerosis     Deep vein thrombosis ([facility])     at age 17 R/T cast    Depression     Diabetes ([facility])     Essential hypertension 2014    3/21 echo: severe concentric LVH with normal EF and no MR or pHTN    Extrinsic asthma, unspecified     Heart attack ([facility])     2016- angiogram- no intervention    Heart valve disease     mitral valve repair in 1994/    High blood pressure     High cholesterol     History of mitral valve repair 12/2022    Hyperlipidemia     Low back pain     tight and stiff after sweeping and mopping    LVH (left ventricular  hypertrophy)     Migraines     Mixed hyperlipidemia 2021     HDL 38 LDL 97 VLDL 57     Monoclonal gammopathy     IgG kappa     MVP (mitral valve prolapse)     Repair  at [place]; echoes as recently as 3/21 show mild or trivial MR and no stenosis    Neuropathy     Osteoarthritis     hip ,knees    Pneumonia due to organism     Pulmonary embolism (HCC) 2023    Renal disorder     TIA (transient ischemic attack) 2021    Initial history of left-sided weakness and slurred speech. (+) cocaine. MRI of the brain, CT angiogram of the head and neck, and 2D echo are all unremarkable.     TMJ (dislocation of temporomandibular joint)     Troponin level elevated 2021    Trop 60 60 47 with TIA and no CP: Lexiscan negative with EF 51      Past Surgical History:   Procedure Laterality Date    COLONOSCOPY N/A 2023    Procedure: COLONOSCOPY;  Surgeon: Heath Vu MD;  Location:  ENDOSCOPY    COLONOSCOPY N/A 2023    Procedure: COLONOSCOPY with cold snare polypectomy and forcep polypectomy;  Surgeon: Ousmane Suarez MD;  Location:  ENDOSCOPY    COLONOSCOPY & POLYPECTOMY  2019    EGD  2019    Duodenitis. Biopsied. EUS for weight loss was negative    HEART SURGERY      HERNIA SURGERY  2022    Dr Barnes    LAMINECTOMY,>2 SGMT,LUMBAR  2018    L4-L5 Decomp Discectomy ROEM L4-L5    MITRALPLASTY W CP BYPASS      [place]: Repair    REPAIR ROTATOR CUFF,CHRONIC Left     torn and had a ruptured bicep    VALVE REPAIR      mitral valve        ALL:  Allergies   Allergen Reactions    Hydrochlorothiazide RASH and HIVES        No current outpatient medications on file.       Social History     Tobacco Use    Smoking status: Former     Packs/day: 1.00     Years: 27.00     Additional pack years: 0.00     Total pack years: 27.00     Types: Cigarettes     Quit date: 2022     Years since quittin.8    Smokeless tobacco: Never   Substance Use Topics    Alcohol use: No        Fam  Hx  Family History   Problem Relation Age of Onset    Hypertension Father     Alcohol and Other Disorders Associated Father     Substance Abuse Father         cocaine    Dementia Father     Cancer Father         lung    Diabetes Mother     Cancer Mother         multiple myeloma    Hypertension Mother     Anxiety Maternal Aunt     Depression Maternal Aunt     Anxiety Maternal Aunt     Depression Maternal Aunt     Bipolar Disorder Maternal Aunt     Diabetes Maternal Grandmother     Hypertension Maternal Grandmother     Cancer Maternal Grandfather         stomach cancer    Diabetes Maternal Grandfather     Hypertension Maternal Grandfather     Alcohol and Other Disorders Associated Maternal Grandfather     Hypertension Paternal Grandmother     Hypertension Paternal Grandfather     Cancer Sister         uterine and ovarian    Hypertension Sister     Cancer Maternal Uncle         lung    Cancer Paternal Aunt         throat       Review of Systems  Comprehensive ROS reviewed and negative except for what is stated in HPI.      OBJECTIVE:  /81 (BP Location: Left arm)   Pulse 92   Temp 98.8 °F (37.1 °C) (Oral)   Resp 20   Ht 6' 2\" (1.88 m)   Wt 258 lb 6.4 oz (117.2 kg)   SpO2 97%   BMI 33.18 kg/m²   General:  Alert, no distress, appears stated age.    Head:  Normocephalic, without obvious abnormality, atraumatic.   Eyes:  Sclera anicteric, No conjunctival pallor, EOMs intact.    Nose: Nares normal. Septum midline. Mucosa normal. No drainage.   Throat: Lips, mucosa, and tongue normal. Teeth and gums normal.   Neck: Supple, symmetrical, trachea midline, no cervical or supraclavicular lymph adenopathy, thyroid: no enlargment/tenderness/nodules appreciated   Lungs:   Clear to auscultation bilaterally. Normal effort   Chest wall:  No tenderness or deformity.   Heart:  Regular rate and rhythm, S1, S2 normal, no murmur, rub or gallop appreciated   Abdomen:   Soft, non-tender. Bowel sounds normal. No masses,  No  organomegaly. Non distended   Extremities: Extremities normal, atraumatic, no cyanosis or edema.   Skin: Skin color, texture, turgor normal. No rashes or lesions.    Neurologic: Normal strength, no focal deficit appreciated     Data Review:    LABS:   Lab Results   Component Value Date    WBC 4.6 01/03/2024    HGB 9.4 01/03/2024    HCT 27.9 01/03/2024    .0 01/03/2024    CREATSERUM 7.18 01/03/2024    BUN 30 01/03/2024     01/03/2024    K 3.8 01/03/2024     01/03/2024    CO2 25.0 01/03/2024     01/03/2024    CA 7.2 01/03/2024    ALB 3.4 01/03/2024    ALKPHO 110 01/03/2024    BILT 0.3 01/03/2024    TP 7.3 01/03/2024    AST 45 01/03/2024    ALT 61 01/03/2024       CXR: All imaging personally reviewed.      Radiology: XR CHEST PA + LAT CHEST (CPT=71046)    Result Date: 1/3/2024  PROCEDURE:  XR CHEST PA + LAT CHEST (CPT=71046)  INDICATIONS:  went to walk in clinic this morning and tested pos for covid, hypotensive, feeling dizzy/lightheaded, sinus/chest congestion, neck and back pain  COMPARISON:  [facility], CT, CT ABDOMEN+PELVIS(CPT=74176), 12/28/2023, 7:56 AM.  [facility], XR, XR CHEST AP PORTABLE  (CPT=71045), 11/23/2023, 3:30 PM.  TECHNIQUE:  PA and lateral chest radiographs were obtained.  PATIENT STATED HISTORY: (As transcribed by Technologist)  Pt c/o mid anterior chest discomfort, body chills and dizzness that began today.    FINDINGS:  Tunneled right hemodialysis catheter in place.  Normal heart size with valve prosthesis.  Normal pulmonary vascularity.  Right pleural effusion and right basilar pleural parenchymal opacity appear improved.  No new pulmonary opacity.            CONCLUSION:  No acute cardiopulmonary pathology.  Decreased right pleural effusion and right basilar opacity.   LOCATION:  [facility]   Dictated by (CST): Ricki Zaragoza MD on 1/03/2024 at 9:43 PM     Finalized by (CST): Ricki Zaragoza MD on 1/03/2024 at 9:44 PM       CT ABDOMEN+PELVIS(CPT=74176)    Result Date:  12/28/2023  PROCEDURE:  CT ABDOMEN+PELVIS (CPT=74176)  COMPARISON:  [facility], CT, CTA CHEST + CT ABD (W) + CT PEL (W) SH(CPT=71275/41465), 11/23/2023, 4:31 PM.  INDICATIONS:  abdominal pain, bloated, rectal bleeding since yesterday  TECHNIQUE:  Unenhanced multislice CT scanning was performed from the dome of the diaphragm to the pubic symphysis.  Dose reduction techniques were used. Dose information is transmitted to the ACR (American College of Radiology) NRDR (National Radiology Data Registry) which includes the Dose Index Registry.  PATIENT STATED HISTORY: (As transcribed by Technologist)  Patient has had lower abdominal pain for 3 months and rectal bleeding for 2 days.    FINDINGS:  LUNG BASE:  Stable right pleural effusion with atelectasis/airspace disease in the right middle lobe and right lung base.. LIVER:  Unremarkable. BILIARY:  No biliary ductal dilatation. PANCREAS:  Unremarkable. SPLEEN:  Normal caliber. KIDNEYS:  No hydronephrosis or nephrolithiasis. ADRENALS:  Normal. AORTA/VASCULAR:  No aneurysm. RETROPERITONEUM:  No enlarged adenopathy. BOWEL/MESENTERY:  Normal caliber appendix. Uncomplicated colonic diverticulosis ABDOMINAL WALL:  Fat containing bilateral inguinal hernias.. PELVIC ORGANS:  Circumferential wall thickening of the urinary bladder has progressed since 11/23/2023.  This may be accentuated by incomplete distension.  BONES:  Disc space narrowing L5-S1.  Degenerative disc disease L4-5.  Mild degenerative changes in the lower lumbar facets.             CONCLUSION:  Circumferential wall thickening of the urinary bladder has progressed slightly since 11/23/2023.  Clinical correlation for cystitis is recommended.  Normal caliber appendix.  Colonic diverticulosis.  Stable right pleural effusion with atelectasis/airspace disease in the right lung base.   LOCATION:  [place]   Dictated by (CST): Arlyn Oglesby MD on 12/28/2023 at 8:18 AM     Finalized by (CST): Arlyn Oglesby MD on 12/28/2023  at 8:22 AM          Assessment/Plan:     43 year old male with PMH sig for end-stage renal disease on HD, HFpEF, diabetes mellitus type 2, COPD, chronic abdominal pain and depression/bipolar/ADHD with multiple ER/hospital admissions presented with low blood pressure.    Hypovolemic Hypotension in setting of COVID URI  Hx Essential HTN  - BP's now stabilized (normal) although hasn't taken any antiHTN's this AM  - s/p gentle IVF bolus  - encourage ambulation to improve BP  - hold home coreg, losartan, hydralazine, nifedipine, imdur until BP's improve - ok to resume on dc   - no indication for steroid therapy for COVID     Elevated troponin  - in setting of ESRD  - chronically elevated  - no CP/EKG changes  - no suspicion for ACS    ESRD on HD  - HD per nephrology    HFpEF - compensated, holding meds for low BP   DM type 2 - ISS/accucheks  COPD - incruse  Hyperlipidemia - lofibra   Depression/bipolar/ADHD - resume home meds prozac, lamictal    FEN: regular diet  Proph: SCDs, heparin  Code status: Full code     Outpatient records or previous hospital records reviewed.   [facility] hospitalist to continue to follow patient while in house    Dispo - ok to dc after HD, d/w nephrology     Eder Hall MD  [facility]  Hospitalist  Message over [employer]  Pager: [phone]

## 2024-01-05 LAB
ANION GAP SERPL CALC-SCNC: 8 MMOL/L (ref 0–18)
BASOPHILS # BLD AUTO: 0.03 X10(3) UL (ref 0–0.2)
BASOPHILS NFR BLD AUTO: 1.2 %
BUN BLD-MCNC: 22 MG/DL (ref 9–23)
CALCIUM BLD-MCNC: 8.5 MG/DL (ref 8.5–10.1)
CHLORIDE SERPL-SCNC: 106 MMOL/L (ref 98–112)
CO2 SERPL-SCNC: 23 MMOL/L (ref 21–32)
CREAT BLD-MCNC: 5.01 MG/DL
EGFRCR SERPLBLD CKD-EPI 2021: 14 ML/MIN/1.73M2 (ref 60–?)
EOSINOPHIL # BLD AUTO: 0.02 X10(3) UL (ref 0–0.7)
EOSINOPHIL NFR BLD AUTO: 0.8 %
ERYTHROCYTE [DISTWIDTH] IN BLOOD BY AUTOMATED COUNT: 11.9 %
GLUCOSE BLD-MCNC: 104 MG/DL (ref 70–99)
GLUCOSE BLD-MCNC: 117 MG/DL (ref 70–99)
GLUCOSE BLD-MCNC: 142 MG/DL (ref 70–99)
HCT VFR BLD AUTO: 32.1 %
HGB BLD-MCNC: 10.5 G/DL
IMM GRANULOCYTES # BLD AUTO: 0.01 X10(3) UL (ref 0–1)
IMM GRANULOCYTES NFR BLD: 0.4 %
LYMPHOCYTES # BLD AUTO: 0.71 X10(3) UL (ref 1–4)
LYMPHOCYTES NFR BLD AUTO: 27.6 %
MAGNESIUM SERPL-MCNC: 1.9 MG/DL (ref 1.6–2.6)
MCH RBC QN AUTO: 30.2 PG (ref 26–34)
MCHC RBC AUTO-ENTMCNC: 32.7 G/DL (ref 31–37)
MCV RBC AUTO: 92.2 FL
MONOCYTES # BLD AUTO: 0.69 X10(3) UL (ref 0.1–1)
MONOCYTES NFR BLD AUTO: 26.8 %
NEUTROPHILS # BLD AUTO: 1.11 X10 (3) UL (ref 1.5–7.7)
NEUTROPHILS # BLD AUTO: 1.11 X10(3) UL (ref 1.5–7.7)
NEUTROPHILS NFR BLD AUTO: 43.2 %
OSMOLALITY SERPL CALC.SUM OF ELEC: 288 MOSM/KG (ref 275–295)
PLATELET # BLD AUTO: 162 10(3)UL (ref 150–450)
POTASSIUM SERPL-SCNC: 3.5 MMOL/L (ref 3.5–5.1)
RBC # BLD AUTO: 3.48 X10(6)UL
SODIUM SERPL-SCNC: 137 MMOL/L (ref 136–145)
WBC # BLD AUTO: 2.6 X10(3) UL (ref 4–11)

## 2024-01-05 PROCEDURE — 83735 ASSAY OF MAGNESIUM: CPT | Performed by: HOSPITALIST

## 2024-01-05 PROCEDURE — 80048 BASIC METABOLIC PNL TOTAL CA: CPT | Performed by: HOSPITALIST

## 2024-01-05 PROCEDURE — 82962 GLUCOSE BLOOD TEST: CPT

## 2024-01-05 PROCEDURE — 85025 COMPLETE CBC W/AUTO DIFF WBC: CPT | Performed by: HOSPITALIST

## 2024-01-05 RX ORDER — NIFEDIPINE 30 MG/1
30 TABLET, EXTENDED RELEASE ORAL NIGHTLY
Status: DISCONTINUED | OUTPATIENT
Start: 2024-01-05 | End: 2024-01-06

## 2024-01-05 RX ORDER — SEVELAMER CARBONATE 800 MG/1
800 TABLET, FILM COATED ORAL
Status: DISCONTINUED | OUTPATIENT
Start: 2024-01-05 | End: 2024-01-06

## 2024-01-05 RX ORDER — NIFEDIPINE 30 MG/1
60 TABLET, EXTENDED RELEASE ORAL EVERY MORNING
Status: DISCONTINUED | OUTPATIENT
Start: 2024-01-05 | End: 2024-01-06

## 2024-01-05 RX ORDER — BENZONATATE 100 MG/1
100 CAPSULE ORAL 3 TIMES DAILY PRN
Status: DISCONTINUED | OUTPATIENT
Start: 2024-01-05 | End: 2024-01-06

## 2024-01-05 RX ORDER — CLONIDINE HYDROCHLORIDE 0.1 MG/1
0.1 TABLET ORAL 2 TIMES DAILY
Status: DISCONTINUED | OUTPATIENT
Start: 2024-01-05 | End: 2024-01-06

## 2024-01-05 RX ORDER — ARIPIPRAZOLE 5 MG/1
15 TABLET ORAL DAILY
Status: DISCONTINUED | OUTPATIENT
Start: 2024-01-05 | End: 2024-01-06

## 2024-01-05 RX ORDER — POTASSIUM CHLORIDE 20 MEQ/1
20 TABLET, EXTENDED RELEASE ORAL DAILY
Status: DISCONTINUED | OUTPATIENT
Start: 2024-01-05 | End: 2024-01-06

## 2024-01-05 RX ORDER — BUPROPION HYDROCHLORIDE 150 MG/1
150 TABLET ORAL DAILY
Status: DISCONTINUED | OUTPATIENT
Start: 2024-01-05 | End: 2024-01-06

## 2024-01-05 RX ORDER — HYDRALAZINE HYDROCHLORIDE 25 MG/1
25 TABLET, FILM COATED ORAL 2 TIMES DAILY
Status: DISCONTINUED | OUTPATIENT
Start: 2024-01-05 | End: 2024-01-06

## 2024-01-05 RX ORDER — BUTALBITAL, ACETAMINOPHEN AND CAFFEINE 50; 325; 40 MG/1; MG/1; MG/1
1 TABLET ORAL ONCE
Status: COMPLETED | OUTPATIENT
Start: 2024-01-05 | End: 2024-01-05

## 2024-01-05 RX ORDER — FUROSEMIDE 40 MG/1
80 TABLET ORAL
Status: DISCONTINUED | OUTPATIENT
Start: 2024-01-05 | End: 2024-01-06

## 2024-01-05 RX ORDER — LOSARTAN POTASSIUM 100 MG/1
100 TABLET ORAL DAILY
Status: DISCONTINUED | OUTPATIENT
Start: 2024-01-05 | End: 2024-01-06

## 2024-01-05 RX ORDER — CARVEDILOL 12.5 MG/1
25 TABLET ORAL 2 TIMES DAILY WITH MEALS
Status: DISCONTINUED | OUTPATIENT
Start: 2024-01-05 | End: 2024-01-06

## 2024-01-05 RX ORDER — ISOSORBIDE MONONITRATE 30 MG/1
60 TABLET, EXTENDED RELEASE ORAL DAILY
Status: DISCONTINUED | OUTPATIENT
Start: 2024-01-05 | End: 2024-01-06

## 2024-01-05 RX ORDER — ALBUTEROL SULFATE 90 UG/1
2 AEROSOL, METERED RESPIRATORY (INHALATION) 4 TIMES DAILY
Status: DISCONTINUED | OUTPATIENT
Start: 2024-01-05 | End: 2024-01-06

## 2024-01-05 RX ORDER — GUAIFENESIN 600 MG/1
1200 TABLET, EXTENDED RELEASE ORAL 2 TIMES DAILY
Status: DISCONTINUED | OUTPATIENT
Start: 2024-01-05 | End: 2024-01-06

## 2024-01-05 RX ORDER — BENZONATATE 100 MG/1
100 CAPSULE ORAL 3 TIMES DAILY PRN
Qty: 30 CAPSULE | Refills: 0 | Status: SHIPPED | OUTPATIENT
Start: 2024-01-05

## 2024-01-05 RX ORDER — TAMSULOSIN HYDROCHLORIDE 0.4 MG/1
0.4 CAPSULE ORAL DAILY
Status: DISCONTINUED | OUTPATIENT
Start: 2024-01-05 | End: 2024-01-06

## 2024-01-05 RX ORDER — LABETALOL HYDROCHLORIDE 5 MG/ML
10 INJECTION, SOLUTION INTRAVENOUS ONCE
Status: COMPLETED | OUTPATIENT
Start: 2024-01-05 | End: 2024-01-05

## 2024-01-05 NOTE — PROGRESS NOTES
[facility] - Nephrology  Inpatient Follow-up    Godwin Fonseca Patient Status:  Observation    1980 MRN [id number]   [facility] 4NW-A Attending Karen Hall*   Hosp Day # 0 PCP Prieto Novak MD       SUBJECTIVE:     Patient seen and examined at bedside.     MEDICATIONS:     Current Facility-Administered Medications   Medication Dose Route Frequency    tamsulosin (Flomax) cap 0.4 mg  0.4 mg Oral Daily    sevelamer carbonate (Renvela) tab 800 mg  800 mg Oral TID CC    potassium chloride (K-Dur) tab 20 mEq  20 mEq Oral Daily    NIFEdipine ER (Procardia-XL) 24 hr tab 60 mg  60 mg Oral QAM    NIFEdipine ER (Procardia-XL) 24 hr tab 30 mg  30 mg Oral Nightly    losartan (Cozaar) tab 100 mg  100 mg Oral Daily    isosorbide mononitrate ER (Imdur) 24 hr tab 60 mg  60 mg Oral Daily    hydrALAZINE (Apresoline) tab 25 mg  25 mg Oral BID    furosemide (Lasix) tab 80 mg  80 mg Oral BID (Diuretic)    cloNIDine (Catapres) tab 0.1 mg  0.1 mg Oral BID    carvedilol (Coreg) tab 25 mg  25 mg Oral BID with meals    buPROPion ER (Wellbutrin XL) 24 hr tab 150 mg  150 mg Oral Daily    ARIPiprazole (Abilify) tab 15 mg  15 mg Oral Daily    guaiFENesin ER (Mucinex) 12 hr tab 1,200 mg  1,200 mg Oral BID    benzonatate (Tessalon) cap 100 mg  100 mg Oral TID PRN    albuterol (Ventolin HFA) 108 (90 Base) MCG/ACT inhaler 2 puff  2 puff Inhalation QID    albuterol (Ventolin HFA) 108 (90 Base) MCG/ACT inhaler 2 puff  2 puff Inhalation Q6H PRN    fenofibrate micronized (Lofibra) cap 134 mg  134 mg Oral Nightly    FLUoxetine (PROzac) cap 40 mg  40 mg Oral Daily    umeclidinium bromide (Incruse Ellipta) 62.5 MCG/ACT inhaler 1 puff  1 puff Inhalation Daily    lamoTRIgine (LaMICtal) tab 150 mg  150 mg Oral Daily    heparin (Porcine) 5000 UNIT/ML injection 5,000 Units  5,000 Units Subcutaneous Q8H MARTHA    acetaminophen (Tylenol Extra Strength) tab 500 mg  500 mg Oral Q4H PRN    ondansetron (Zofran) 4 MG/2ML  injection 4 mg  4 mg Intravenous Q6H PRN    prochlorperazine (Compazine) 10 MG/2ML injection 5 mg  5 mg Intravenous Q8H PRN    lidocaine-menthol 4-1 % patch 1 patch  1 patch Transdermal Daily    fluticasone propionate (Flonase) 50 MCG/ACT nasal suspension 1 spray  1 spray Each Nare Daily       PHYSICAL EXAM:     Vital Signs: BP (!) 149/97 (BP Location: Left arm)   Pulse 92   Temp 98.3 °F (36.8 °C) (Oral)   Resp 20   Ht 6' 2\" (1.88 m)   Wt 250 lb (113.4 kg)   SpO2 100%   BMI 32.10 kg/m²   Temp (24hrs), Av.8 °F (37.1 °C), Min:98.3 °F (36.8 °C), Max:99.5 °F (37.5 °C)       Intake/Output Summary (Last 24 hours) at 2024 1500  Last data filed at 2024 0130  Gross per 24 hour   Intake 360 ml   Output 3150 ml   Net -2790 ml     Wt Readings from Last 3 Encounters:   24 250 lb (113.4 kg)   24 250 lb (113.4 kg)   23 254 lb 13.6 oz (115.6 kg)       General: no acute distress  HEENT: normocephalic, atraumatic  CV: RRR  Respiratory: no distress, clear breath sounds bilaterally  Abdomen: soft, non-tender  Extremities: no edema bilaterally  Skin: no rashes on visible skin  Neuro: alert and oriented x3, no focal deficits     LABORATORY DATA:     Lab Results   Component Value Date     (H) 2024    BUN 22 2024    BUNCREA 13.0 2022    CREATSERUM 5.01 (H) 2024    ANIONGAP 8 2024    GFR 59 (L) 2018    GFRNAA 30 (L) 2022    GFRAA 35 (L) 2022    CA 8.5 2024    OSMOCALC 288 2024    ALKPHO 110 2024    AST 45 (H) 2024    ALT 61 2024    BILT 0.3 2024    TP 7.3 2024    ALB 3.4 2024    GLOBULIN 3.9 2024     2024    K 3.5 2024     2024    CO2 23.0 2024     Lab Results   Component Value Date    WBC 2.6 (L) 2024    RBC 3.48 (L) 2024    HGB 10.5 (L) 2024    HCT 32.1 (L) 2024    .0 2024    MPV 11.5 2012    MCV 92.2 2024     MCH 30.2 01/05/2024    MCHC 32.7 01/05/2024    RDW 11.9 01/05/2024    NEPRELIM 1.11 (L) 01/05/2024    NEPERCENT 43.2 01/05/2024    LYPERCENT 27.6 01/05/2024    MOPERCENT 26.8 01/05/2024    EOPERCENT 0.8 01/05/2024    BAPERCENT 1.2 01/05/2024    NE 1.11 (L) 01/05/2024    LYMABS 0.71 (L) 01/05/2024    MOABSO 0.69 01/05/2024    EOABSO 0.02 01/05/2024    BAABSO 0.03 01/05/2024     Lab Results   Component Value Date    MALBP 167.00 05/24/2023    CREUR 142.00 05/24/2023    CREUR 142.00 05/24/2023     Lab Results   Component Value Date    COLORUR Yellow 01/03/2024    CLARITY Clear 01/03/2024    SPECGRAVITY 1.025 01/03/2024    GLUUR Negative 01/03/2024    BILUR Negative 01/03/2024    KETUR Trace (A) 01/03/2024    BLOODURINE Negative 01/03/2024    PHURINE 5.5 01/03/2024    PROUR >=300 (A) 01/03/2024    UROBILINOGEN 0.2 01/03/2024    NITRITE Negative 01/03/2024    LEUUR Negative 01/03/2024    WBCUR 6-10 (A) 01/03/2024    RBCUR 0-2 01/03/2024    EPIUR Few (A) 01/03/2024    BACUR Rare (A) 01/03/2024    CAOXUR Occasional (A) 04/20/2018    HYLUR Present (A) 05/14/2019       IMAGING:     Reviewed    ASSESSMENT:      # ESRD  -HD [facility]  -[facility]  -Access: [facility]     # Hypertension/Volume status  - lbs  -Home medications: carvedilol, isosorbide, hydralazine, losartan, nifedipine, clonidine, furosemide     # Anemia of ESRD  # Acute blood loss anemia     # Secondary hyperparathyroidism     PLAN:      -HD TTS while inpatient  -UF with HD as tolerated  -Continue home BP medications  -Continue sevelamer 800mg TID  -Avoid nephrotoxins and renally dose medications for creatinine clearance  -Monitor intake and output daily  -Daily weights           We will continue to follow.    Thank you for allowing us to participate in the care of this patient.       Samantha Muñoz DO   [facility] - Nephrology

## 2024-01-05 NOTE — PROGRESS NOTES
Still continues to have headaches and elevated blood pressures.  Hydralazine did not impact blood pressure and tylenol providing no relief of headache.  Gave labatelol 10mg and fioricet.  Heart rate 90-100and regular.  Saturating above 95% on room air.

## 2024-01-06 VITALS
WEIGHT: 250 LBS | SYSTOLIC BLOOD PRESSURE: 99 MMHG | HEIGHT: 74 IN | RESPIRATION RATE: 20 BRPM | OXYGEN SATURATION: 100 % | HEART RATE: 86 BPM | TEMPERATURE: 98 F | DIASTOLIC BLOOD PRESSURE: 58 MMHG | BODY MASS INDEX: 32.08 KG/M2

## 2024-01-06 NOTE — PLAN OF CARE
Patient with occasional productive cough, respirations unlabored, lungs sound clear, O2 sat 99% on room air. 's systolic.125- BP 99/58, patient denies dizziness Dr. Melendez and Dr.. Irizarry notified. Dr. Melendez ok with discharge.

## 2024-01-06 NOTE — PROGRESS NOTES
[facility]  Hospitalist Progress Note                                                                     [facility]        Godwin Fonseca  4/12/1980    SUBJECTIVE:  Patient seen and examined.  Mild cough and some diarrhea.  Feeling tired.  Denies CP/SOB  NAD.       OBJECTIVE:  Temp:  [97.2 °F (36.2 °C)-98.6 °F (37 °C)] 97.5 °F (36.4 °C)  Pulse:  [85-96] 86  Resp:  [18-22] 20  BP: ()/(58-97) 99/58  SpO2:  [98 %-100 %] 100 %  Exam  Gen: No acute distress, alert and oriented x3, no focal neurologic deficits  Pulm: Lungs clear bilaterally, normal respiratory effort  CV: Heart with regular rate and rhythm, no murmur.  Normal PMI.    Abd: Abdomen soft, nontender, nondistended, no organomegaly, bowel sounds present  MSK: Full range of motion in extremities, no clubbing, no cyanosis  Skin: no rashes or lesions    Labs:   Recent Labs   Lab 01/03/24 2044 01/05/24  0731   WBC 4.6 2.6*   HGB 9.4* 10.5*   MCV 91.5 92.2   .0 162.0       Recent Labs   Lab 01/03/24 2044 01/05/24  0731    137   K 3.8 3.5    106   CO2 25.0 23.0   BUN 30* 22   CREATSERUM 7.18* 5.01*   CA 7.2* 8.5   MG  --  1.9   * 104*       Recent Labs   Lab 01/03/24 2044   ALT 61   AST 45*   ALB 3.4       Recent Labs   Lab 12/30/23  1253 12/30/23  1557 01/05/24  1136 01/05/24  1640   PGLU 137* 120* 117* 142*       Meds:   Scheduled:    tamsulosin  0.4 mg Oral Daily    sevelamer carbonate  800 mg Oral TID CC    potassium chloride  20 mEq Oral Daily    NIFEdipine ER  60 mg Oral QAM    NIFEdipine ER  30 mg Oral Nightly    losartan  100 mg Oral Daily    isosorbide mononitrate ER  60 mg Oral Daily    hydrALAZINE  25 mg Oral BID    furosemide  80 mg Oral BID (Diuretic)    cloNIDine  0.1 mg Oral BID    carvedilol  25 mg Oral BID with meals    buPROPion ER  150 mg Oral Daily    ARIPiprazole  15 mg Oral Daily    guaiFENesin ER  1,200 mg Oral BID    albuterol  2 puff Inhalation  QID    fenofibrate micronized  134 mg Oral Nightly    FLUoxetine HCl  40 mg Oral Daily    umeclidinium bromide  1 puff Inhalation Daily    lamoTRIgine  150 mg Oral Daily    heparin  5,000 Units Subcutaneous Q8H MARTHA    lidocaine-menthol  1 patch Transdermal Daily    fluticasone propionate  1 spray Each Nare Daily     Continuous Infusions:   PRN: benzonatate, albuterol, acetaminophen, ondansetron, prochlorperazine    Assessment/Plan:  Principal Problem:    COVID-19 virus infection  Active Problems:    Hypotension, unspecified hypotension type    43 year old male with PMH sig for end-stage renal disease on HD, HFpEF, diabetes mellitus type 2, COPD, chronic abdominal pain and depression/bipolar/ADHD with multiple ER/hospital admissions presented with low blood pressure.     Hypovolemic Hypotension in setting of COVID URI  Hx Essential HTN  - BP's now stabilized (normal) although hasn't taken any antiHTN's this AM  - s/p gentle IVF bolus  - encourage ambulation to improve BP  - hold home coreg, losartan, hydralazine, nifedipine, imdur until BP's improve - ok to resume   - no indication for steroid therapy for COVID - supportive cares, mucinex/tessalon dhillon, albuterol. No paxlovid because of esrd      Elevated troponin  - in setting of ESRD  - chronically elevated  - no CP/EKG changes  - no suspicion for ACS     ESRD on HD  - HD per nephrology     HFpEF - compensated, holding meds for low BP   DM type 2 - ISS/accucheks  COPD - incruse  Hyperlipidemia - lofibra   Depression/bipolar/ADHD - resume home meds prozac, lamictal    Dc if BP stable       Eder Hall MD  [facility]  Message over [employer]  Pager: [phone]

## 2024-01-06 NOTE — PROGRESS NOTES
NURSING DISCHARGE NOTE    Discharged Home via Wheelchair.  Accompanied by  self  Belongings Taken by patient/family.

## 2024-01-06 NOTE — PLAN OF CARE
Pt a/ox4. VSS. Afebrile. Pt c/o inner ear & neck pain, PRN tylenol & heat packs given. Medication admin per MAR. Denies dizziness. Dry cough. Isolation precautions maintained. Call light within reach. Safety precautions in place.     Problem: PAIN - ADULT  Goal: Verbalizes/displays adequate comfort level or patient's stated pain goal  Description: INTERVENTIONS:  - Encourage pt to monitor pain and request assistance  - Assess pain using appropriate pain scale  - Administer analgesics based on type and severity of pain and evaluate response  - Implement non-pharmacological measures as appropriate and evaluate response  - Consider cultural and social influences on pain and pain management  - Manage/alleviate anxiety  - Utilize distraction and/or relaxation techniques  - Monitor for opioid side effects  - Notify MD/LIP if interventions unsuccessful or patient reports new pain  - Anticipate increased pain with activity and pre-medicate as appropriate  Outcome: Progressing     Problem: RESPIRATORY - ADULT  Goal: Achieves optimal ventilation and oxygenation  Description: INTERVENTIONS:  - Assess for changes in respiratory status  - Assess for changes in mentation and behavior  - Position to facilitate oxygenation and minimize respiratory effort  - Oxygen supplementation based on oxygen saturation or ABGs  - Provide Smoking Cessation handout, if applicable  - Encourage broncho-pulmonary hygiene including cough, deep breathe, Incentive Spirometry  - Assess the need for suctioning and perform as needed  - Assess and instruct to report SOB or any respiratory difficulty  - Respiratory Therapy support as indicated  - Manage/alleviate anxiety  - Monitor for signs/symptoms of CO2 retention  Outcome: Progressing

## 2024-01-06 NOTE — PLAN OF CARE
Was sitting on the chair, still experiencing headache, had Tylenol from the last shift, has had 2 diarrhea stools, denies any nausea, has fair appetite.  Plan discharge today.  Patient agrees with plan of care.  Started back on blood pressure medications, blood pressures were in the 140's and 150's.  1300 complained of being dizzy, held some of his blood pressure medications, blood pressure 116/80. Appetite is better, ate 75% on his lunch.    1800 still not feeling well, decline to go home, states still having diarrhea, 4-5 times total this shift.  MD was notified. Hospitalist requested to notify nephrology, secured messaging done.   Had good appetite for dinner.   Problem: PAIN - ADULT  Goal: Verbalizes/displays adequate comfort level or patient's stated pain goal  Description: INTERVENTIONS:  - Encourage pt to monitor pain and request assistance  - Assess pain using appropriate pain scale  - Administer analgesics based on type and severity of pain and evaluate response  - Implement non-pharmacological measures as appropriate and evaluate response  - Consider cultural and social influences on pain and pain management  - Manage/alleviate anxiety  - Utilize distraction and/or relaxation techniques  - Monitor for opioid side effects  - Notify MD/LIP if interventions unsuccessful or patient reports new pain  - Anticipate increased pain with activity and pre-medicate as appropriate  Outcome: Progressing     Problem: RESPIRATORY - ADULT  Goal: Achieves optimal ventilation and oxygenation  Description: INTERVENTIONS:  - Assess for changes in respiratory status  - Assess for changes in mentation and behavior  - Position to facilitate oxygenation and minimize respiratory effort  - Oxygen supplementation based on oxygen saturation or ABGs  - Provide Smoking Cessation handout, if applicable  - Encourage broncho-pulmonary hygiene including cough, deep breathe, Incentive Spirometry  - Assess the need for suctioning and perform  as needed  - Assess and instruct to report SOB or any respiratory difficulty  - Respiratory Therapy support as indicated  - Manage/alleviate anxiety  - Monitor for signs/symptoms of CO2 retention  Outcome: Progressing     Problem: GASTROINTESTINAL - ADULT  Goal: Maintains or returns to baseline bowel function  Description: INTERVENTIONS:  - Assess bowel function  - Maintain adequate hydration with IV or PO as ordered and tolerated  - Evaluate effectiveness of GI medications  - Encourage mobilization and activity  - Obtain nutritional consult as needed  - Establish a toileting routine/schedule  - Consider collaborating with pharmacy to review patient's medication profile  Outcome: Not Progressing

## 2024-03-08 ENCOUNTER — APPOINTMENT (OUTPATIENT)
Dept: GENERAL RADIOLOGY | Age: 44
End: 2024-03-08
Attending: EMERGENCY MEDICINE
Payer: MEDICARE

## 2024-03-08 ENCOUNTER — HOSPITAL ENCOUNTER (INPATIENT)
Facility: HOSPITAL | Age: 44
LOS: 2 days | Discharge: HOME | End: 2024-03-10
Attending: EMERGENCY MEDICINE | Admitting: INTERNAL MEDICINE
Payer: MEDICARE

## 2024-03-08 DIAGNOSIS — I16.0 HYPERTENSIVE URGENCY: ICD-10-CM

## 2024-03-08 DIAGNOSIS — M54.2 NECK PAIN: ICD-10-CM

## 2024-03-08 DIAGNOSIS — R79.89 ELEVATED TROPONIN: Primary | ICD-10-CM

## 2024-03-08 PROBLEM — N17.9 ACUTE RENAL FAILURE (ARF): Status: ACTIVE | Noted: 2024-03-08

## 2024-03-08 PROBLEM — D64.9 ANEMIA: Status: ACTIVE | Noted: 2024-03-08

## 2024-03-08 PROBLEM — N17.9 ACUTE RENAL FAILURE (ARF) (HCC): Status: ACTIVE | Noted: 2024-03-08

## 2024-03-08 LAB
ALBUMIN SERPL-MCNC: 3.4 G/DL (ref 3.4–5)
ALBUMIN/GLOB SERPL: 0.9 {RATIO} (ref 1–2)
ALP LIVER SERPL-CCNC: 92 U/L
ALT SERPL-CCNC: 30 U/L
ANION GAP SERPL CALC-SCNC: 8 MMOL/L (ref 0–18)
AST SERPL-CCNC: 47 U/L (ref 15–37)
ATRIAL RATE: 93 BPM
BASOPHILS # BLD AUTO: 0.1 X10(3) UL (ref 0–0.2)
BASOPHILS NFR BLD AUTO: 1 %
BILIRUB SERPL-MCNC: 0.6 MG/DL (ref 0.1–2)
BUN BLD-MCNC: 80 MG/DL (ref 9–23)
CALCIUM BLD-MCNC: 7.4 MG/DL (ref 8.5–10.1)
CHLORIDE SERPL-SCNC: 111 MMOL/L (ref 98–112)
CHOLEST SERPL-MCNC: 136 MG/DL (ref ?–200)
CO2 SERPL-SCNC: 21 MMOL/L (ref 21–32)
CREAT BLD-MCNC: 7.79 MG/DL
EGFRCR SERPLBLD CKD-EPI 2021: 8 ML/MIN/1.73M2 (ref 60–?)
EOSINOPHIL # BLD AUTO: 0.27 X10(3) UL (ref 0–0.7)
EOSINOPHIL NFR BLD AUTO: 2.8 %
ERYTHROCYTE [DISTWIDTH] IN BLOOD BY AUTOMATED COUNT: 13.9 %
GLOBULIN PLAS-MCNC: 3.8 G/DL (ref 2.8–4.4)
GLUCOSE BLD-MCNC: 123 MG/DL (ref 70–99)
GLUCOSE BLD-MCNC: 189 MG/DL (ref 70–99)
GLUCOSE BLD-MCNC: 98 MG/DL (ref 70–99)
HCT VFR BLD AUTO: 29.2 %
HDLC SERPL-MCNC: 67 MG/DL (ref 40–59)
HGB BLD-MCNC: 9.8 G/DL
IMM GRANULOCYTES # BLD AUTO: 0.03 X10(3) UL (ref 0–1)
IMM GRANULOCYTES NFR BLD: 0.3 %
LDLC SERPL CALC-MCNC: 49 MG/DL (ref ?–100)
LYMPHOCYTES # BLD AUTO: 1.12 X10(3) UL (ref 1–4)
LYMPHOCYTES NFR BLD AUTO: 11.7 %
MCH RBC QN AUTO: 30.8 PG (ref 26–34)
MCHC RBC AUTO-ENTMCNC: 33.6 G/DL (ref 31–37)
MCV RBC AUTO: 91.8 FL
MONOCYTES # BLD AUTO: 0.67 X10(3) UL (ref 0.1–1)
MONOCYTES NFR BLD AUTO: 7 %
NEUTROPHILS # BLD AUTO: 7.38 X10 (3) UL (ref 1.5–7.7)
NEUTROPHILS # BLD AUTO: 7.38 X10(3) UL (ref 1.5–7.7)
NEUTROPHILS NFR BLD AUTO: 77.2 %
NONHDLC SERPL-MCNC: 69 MG/DL (ref ?–130)
NT-PROBNP SERPL-MCNC: ABNORMAL PG/ML (ref ?–125)
OSMOLALITY SERPL CALC.SUM OF ELEC: 319 MOSM/KG (ref 275–295)
P AXIS: 40 DEGREES
P-R INTERVAL: 182 MS
PLATELET # BLD AUTO: 215 10(3)UL (ref 150–450)
POCT INFLUENZA A: NEGATIVE
POCT INFLUENZA B: NEGATIVE
POTASSIUM SERPL-SCNC: 4.1 MMOL/L (ref 3.5–5.1)
PROT SERPL-MCNC: 7.2 G/DL (ref 6.4–8.2)
Q-T INTERVAL: 388 MS
QRS DURATION: 96 MS
QTC CALCULATION (BEZET): 482 MS
R AXIS: 14 DEGREES
RBC # BLD AUTO: 3.18 X10(6)UL
SARS-COV-2 RNA RESP QL NAA+PROBE: NOT DETECTED
SODIUM SERPL-SCNC: 140 MMOL/L (ref 136–145)
T AXIS: 44 DEGREES
TRIGL SERPL-MCNC: 113 MG/DL (ref 30–149)
TROPONIN I SERPL HS-MCNC: 345 NG/L
VENTRICULAR RATE: 93 BPM
VLDLC SERPL CALC-MCNC: 16 MG/DL (ref 0–30)
WBC # BLD AUTO: 9.6 X10(3) UL (ref 4–11)

## 2024-03-08 PROCEDURE — 80061 LIPID PANEL: CPT | Performed by: EMERGENCY MEDICINE

## 2024-03-08 PROCEDURE — 99285 EMERGENCY DEPT VISIT HI MDM: CPT

## 2024-03-08 PROCEDURE — 87502 INFLUENZA DNA AMP PROBE: CPT | Performed by: EMERGENCY MEDICINE

## 2024-03-08 PROCEDURE — 85025 COMPLETE CBC W/AUTO DIFF WBC: CPT | Performed by: EMERGENCY MEDICINE

## 2024-03-08 PROCEDURE — 5A1D70Z PERFORMANCE OF URINARY FILTRATION, INTERMITTENT, LESS THAN 6 HOURS PER DAY: ICD-10-PCS | Performed by: INTERNAL MEDICINE

## 2024-03-08 PROCEDURE — 96376 TX/PRO/DX INJ SAME DRUG ADON: CPT

## 2024-03-08 PROCEDURE — 84484 ASSAY OF TROPONIN QUANT: CPT | Performed by: EMERGENCY MEDICINE

## 2024-03-08 PROCEDURE — 96375 TX/PRO/DX INJ NEW DRUG ADDON: CPT

## 2024-03-08 PROCEDURE — 71045 X-RAY EXAM CHEST 1 VIEW: CPT | Performed by: EMERGENCY MEDICINE

## 2024-03-08 PROCEDURE — 96374 THER/PROPH/DIAG INJ IV PUSH: CPT

## 2024-03-08 PROCEDURE — 83880 ASSAY OF NATRIURETIC PEPTIDE: CPT | Performed by: EMERGENCY MEDICINE

## 2024-03-08 PROCEDURE — 90935 HEMODIALYSIS ONE EVALUATION: CPT | Performed by: INTERNAL MEDICINE

## 2024-03-08 PROCEDURE — 83036 HEMOGLOBIN GLYCOSYLATED A1C: CPT | Performed by: HOSPITALIST

## 2024-03-08 PROCEDURE — 93010 ELECTROCARDIOGRAM REPORT: CPT

## 2024-03-08 PROCEDURE — 80053 COMPREHEN METABOLIC PANEL: CPT | Performed by: EMERGENCY MEDICINE

## 2024-03-08 PROCEDURE — 93005 ELECTROCARDIOGRAM TRACING: CPT

## 2024-03-08 PROCEDURE — 82962 GLUCOSE BLOOD TEST: CPT

## 2024-03-08 RX ORDER — HYDROCODONE BITARTRATE AND ACETAMINOPHEN 5; 325 MG/1; MG/1
2 TABLET ORAL EVERY 4 HOURS PRN
Status: DISCONTINUED | OUTPATIENT
Start: 2024-03-08 | End: 2024-03-08

## 2024-03-08 RX ORDER — NIFEDIPINE 60 MG/1
60 TABLET, EXTENDED RELEASE ORAL 2 TIMES DAILY
Status: DISCONTINUED | OUTPATIENT
Start: 2024-03-09 | End: 2024-03-10

## 2024-03-08 RX ORDER — LOSARTAN POTASSIUM 100 MG/1
100 TABLET ORAL DAILY
Status: DISCONTINUED | OUTPATIENT
Start: 2024-03-09 | End: 2024-03-10

## 2024-03-08 RX ORDER — ONDANSETRON 2 MG/ML
4 INJECTION INTRAMUSCULAR; INTRAVENOUS EVERY 6 HOURS PRN
Status: DISCONTINUED | OUTPATIENT
Start: 2024-03-08 | End: 2024-03-10

## 2024-03-08 RX ORDER — HYDROMORPHONE HYDROCHLORIDE 1 MG/ML
1 INJECTION, SOLUTION INTRAMUSCULAR; INTRAVENOUS; SUBCUTANEOUS ONCE
Status: COMPLETED | OUTPATIENT
Start: 2024-03-08 | End: 2024-03-08

## 2024-03-08 RX ORDER — BISACODYL 10 MG
10 SUPPOSITORY, RECTAL RECTAL
Status: DISCONTINUED | OUTPATIENT
Start: 2024-03-08 | End: 2024-03-10

## 2024-03-08 RX ORDER — SENNOSIDES 8.6 MG
17.2 TABLET ORAL NIGHTLY PRN
Status: DISCONTINUED | OUTPATIENT
Start: 2024-03-08 | End: 2024-03-10

## 2024-03-08 RX ORDER — HYDROCODONE BITARTRATE AND ACETAMINOPHEN 10; 325 MG/1; MG/1
1 TABLET ORAL EVERY 4 HOURS PRN
Status: DISCONTINUED | OUTPATIENT
Start: 2024-03-08 | End: 2024-03-10

## 2024-03-08 RX ORDER — CLONIDINE HYDROCHLORIDE 0.1 MG/1
0.1 TABLET ORAL 2 TIMES DAILY
Status: DISCONTINUED | OUTPATIENT
Start: 2024-03-08 | End: 2024-03-10

## 2024-03-08 RX ORDER — HYDROMORPHONE HYDROCHLORIDE 1 MG/ML
0.8 INJECTION, SOLUTION INTRAMUSCULAR; INTRAVENOUS; SUBCUTANEOUS EVERY 2 HOUR PRN
Status: DISCONTINUED | OUTPATIENT
Start: 2024-03-08 | End: 2024-03-10

## 2024-03-08 RX ORDER — TAMSULOSIN HYDROCHLORIDE 0.4 MG/1
0.4 CAPSULE ORAL
Status: DISCONTINUED | OUTPATIENT
Start: 2024-03-09 | End: 2024-03-10

## 2024-03-08 RX ORDER — HEPARIN SODIUM 1000 [USP'U]/ML
1.5 INJECTION, SOLUTION INTRAVENOUS; SUBCUTANEOUS
Status: COMPLETED | OUTPATIENT
Start: 2024-03-08 | End: 2024-03-08

## 2024-03-08 RX ORDER — ISOSORBIDE MONONITRATE 30 MG/1
30 TABLET, EXTENDED RELEASE ORAL DAILY
COMMUNITY

## 2024-03-08 RX ORDER — HEPARIN SODIUM 5000 [USP'U]/ML
5000 INJECTION, SOLUTION INTRAVENOUS; SUBCUTANEOUS EVERY 8 HOURS SCHEDULED
Status: DISCONTINUED | OUTPATIENT
Start: 2024-03-08 | End: 2024-03-10

## 2024-03-08 RX ORDER — DEXTROAMPHETAMINE SACCHARATE, AMPHETAMINE ASPARTATE, DEXTROAMPHETAMINE SULFATE AND AMPHETAMINE SULFATE 1.25; 1.25; 1.25; 1.25 MG/1; MG/1; MG/1; MG/1
10 TABLET ORAL
Status: DISCONTINUED | OUTPATIENT
Start: 2024-03-09 | End: 2024-03-10

## 2024-03-08 RX ORDER — ISOSORBIDE MONONITRATE 30 MG/1
30 TABLET, EXTENDED RELEASE ORAL DAILY
Status: DISCONTINUED | OUTPATIENT
Start: 2024-03-09 | End: 2024-03-10

## 2024-03-08 RX ORDER — NICOTINE POLACRILEX 4 MG
15 LOZENGE BUCCAL
Status: DISCONTINUED | OUTPATIENT
Start: 2024-03-08 | End: 2024-03-10

## 2024-03-08 RX ORDER — HYDRALAZINE HYDROCHLORIDE 25 MG/1
50 TABLET, FILM COATED ORAL 2 TIMES DAILY
Status: DISCONTINUED | OUTPATIENT
Start: 2024-03-08 | End: 2024-03-10

## 2024-03-08 RX ORDER — LABETALOL HYDROCHLORIDE 5 MG/ML
20 INJECTION, SOLUTION INTRAVENOUS ONCE
Status: COMPLETED | OUTPATIENT
Start: 2024-03-08 | End: 2024-03-08

## 2024-03-08 RX ORDER — LABETALOL HYDROCHLORIDE 5 MG/ML
40 INJECTION, SOLUTION INTRAVENOUS ONCE
Status: COMPLETED | OUTPATIENT
Start: 2024-03-08 | End: 2024-03-08

## 2024-03-08 RX ORDER — FLUOXETINE HYDROCHLORIDE 20 MG/1
40 CAPSULE ORAL DAILY
Status: DISCONTINUED | OUTPATIENT
Start: 2024-03-09 | End: 2024-03-10

## 2024-03-08 RX ORDER — HYDROMORPHONE HYDROCHLORIDE 1 MG/ML
0.2 INJECTION, SOLUTION INTRAMUSCULAR; INTRAVENOUS; SUBCUTANEOUS EVERY 2 HOUR PRN
Status: DISCONTINUED | OUTPATIENT
Start: 2024-03-08 | End: 2024-03-10

## 2024-03-08 RX ORDER — FUROSEMIDE 40 MG/1
80 TABLET ORAL
Status: DISCONTINUED | OUTPATIENT
Start: 2024-03-08 | End: 2024-03-10

## 2024-03-08 RX ORDER — HYDROCODONE BITARTRATE AND ACETAMINOPHEN 5; 325 MG/1; MG/1
1 TABLET ORAL EVERY 4 HOURS PRN
Status: DISCONTINUED | OUTPATIENT
Start: 2024-03-08 | End: 2024-03-08

## 2024-03-08 RX ORDER — NICOTINE POLACRILEX 4 MG
30 LOZENGE BUCCAL
Status: DISCONTINUED | OUTPATIENT
Start: 2024-03-08 | End: 2024-03-10

## 2024-03-08 RX ORDER — POLYETHYLENE GLYCOL 3350 17 G/17G
17 POWDER, FOR SOLUTION ORAL DAILY PRN
Status: DISCONTINUED | OUTPATIENT
Start: 2024-03-08 | End: 2024-03-10

## 2024-03-08 RX ORDER — FENOFIBRATE 134 MG/1
1 CAPSULE ORAL NIGHTLY
Status: DISCONTINUED | OUTPATIENT
Start: 2024-03-08 | End: 2024-03-08

## 2024-03-08 RX ORDER — HYDROMORPHONE HYDROCHLORIDE 1 MG/ML
0.4 INJECTION, SOLUTION INTRAMUSCULAR; INTRAVENOUS; SUBCUTANEOUS EVERY 2 HOUR PRN
Status: DISCONTINUED | OUTPATIENT
Start: 2024-03-08 | End: 2024-03-10

## 2024-03-08 RX ORDER — BUPROPION HYDROCHLORIDE 150 MG/1
150 TABLET ORAL DAILY
Status: DISCONTINUED | OUTPATIENT
Start: 2024-03-09 | End: 2024-03-10

## 2024-03-08 RX ORDER — HYDRALAZINE HYDROCHLORIDE 20 MG/ML
20 INJECTION INTRAMUSCULAR; INTRAVENOUS ONCE
Status: COMPLETED | OUTPATIENT
Start: 2024-03-08 | End: 2024-03-08

## 2024-03-08 RX ORDER — LAMOTRIGINE 150 MG/1
150 TABLET ORAL DAILY
Status: DISCONTINUED | OUTPATIENT
Start: 2024-03-09 | End: 2024-03-10

## 2024-03-08 RX ORDER — SEVELAMER CARBONATE 800 MG/1
800 TABLET, FILM COATED ORAL
Status: DISCONTINUED | OUTPATIENT
Start: 2024-03-09 | End: 2024-03-10

## 2024-03-08 RX ORDER — PROCHLORPERAZINE EDISYLATE 5 MG/ML
5 INJECTION INTRAMUSCULAR; INTRAVENOUS EVERY 8 HOURS PRN
Status: DISCONTINUED | OUTPATIENT
Start: 2024-03-08 | End: 2024-03-10

## 2024-03-08 RX ORDER — DEXTROSE MONOHYDRATE 25 G/50ML
50 INJECTION, SOLUTION INTRAVENOUS
Status: DISCONTINUED | OUTPATIENT
Start: 2024-03-08 | End: 2024-03-10

## 2024-03-08 RX ORDER — ACETAMINOPHEN 325 MG/1
650 TABLET ORAL EVERY 4 HOURS PRN
Status: DISCONTINUED | OUTPATIENT
Start: 2024-03-08 | End: 2024-03-10

## 2024-03-08 RX ORDER — CARVEDILOL 12.5 MG/1
25 TABLET ORAL 2 TIMES DAILY WITH MEALS
Status: DISCONTINUED | OUTPATIENT
Start: 2024-03-08 | End: 2024-03-10

## 2024-03-08 NOTE — CONSULTS
[facility] [place]    Report of Consultation    Godwin Fonseca Patient Status:  Emergency    1980 MRN [id number]   Location [facility] [place] Attending Terry Lew MD   Hosp Day # 0 PCP Prieto Novak MD     Date of consult: 3/8/2024    REASON FOR CONSULT:     ESRD    HISTORY OF PRESENT ILLNESS:     Godwin Fonseca is a a(n) 43 year old male w ESRD on HD TThS, bipolar, HTN, dCHF, COPD, ho DVT, sp MV repair who presents with SOB and neck pain. Notes last HD Saturday. Notes they usually take 4+ L off. Goes to [facility]. BPs elevated in ED, sp IV meds. Got HD overnight.     REVIEW OF SYSTEMS:     Please see HPI for pertinent positives. 10 point review of systems otherwise reviewed and negative.     HISTORY:     Past Medical History:   Diagnosis Date    Asthma (HCC)     Attention deficit hyperactivity disorder (ADHD)     Back problem     Bipolar 1 disorder (HCC)     Cancer (HCC)     CKD (chronic kidney disease) stage 3, GFR 30-59 ml/min ([facility])     Dr Meeks    Congestive heart disease (HCC)     COPD (chronic obstructive pulmonary disease) (HCC)     Coronary atherosclerosis     Deep vein thrombosis (HCC)     at age 17 R/T cast    Depression     Diabetes (HCC)     Essential hypertension 2014    3/21 echo: severe concentric LVH with normal EF and no MR or pHTN    Extrinsic asthma, unspecified     Heart attack (HCC)     - angiogram- no intervention    Heart valve disease     mitral valve repair in /    High blood pressure     High cholesterol     History of mitral valve repair 2022    Hyperlipidemia     Low back pain     tight and stiff after sweeping and mopping    LVH (left ventricular hypertrophy)     Migraines     Mixed hyperlipidemia 2021     HDL 38 LDL 97 VLDL 57     Monoclonal gammopathy     IgG kappa     MVP (mitral valve prolapse)     Repair  at [facility]; echoes as recently as 3/21 show mild or trivial MR and no stenosis     Neuropathy     Osteoarthritis     hip ,knees    Pneumonia due to organism     Pulmonary embolism (HCC) 01/2023    Renal disorder     TIA (transient ischemic attack) 03/2021    Initial history of left-sided weakness and slurred speech. (+) cocaine. MRI of the brain, CT angiogram of the head and neck, and 2D echo are all unremarkable.     TMJ (dislocation of temporomandibular joint)     Troponin level elevated 03/2021    Trop 60 60 47 with TIA and no CP: Lexiscan negative with EF 51     Past Surgical History:   Procedure Laterality Date    COLONOSCOPY N/A 03/26/2023    Procedure: COLONOSCOPY;  Surgeon: Heath Vu MD;  Location:  ENDOSCOPY    COLONOSCOPY N/A 12/30/2023    Procedure: COLONOSCOPY with cold snare polypectomy and forcep polypectomy;  Surgeon: Ousmane Suarez MD;  Location:  ENDOSCOPY    COLONOSCOPY & POLYPECTOMY  2019    EGD  2019    Duodenitis. Biopsied. EUS for weight loss was negative    HEART SURGERY      HERNIA SURGERY  08/17/2022    Dr Barnes    LAMINECTOMY,>2 SGMT,LUMBAR  09/06/2018    L4-L5 Decomp Discectomy ROEM L4-L5    MITRALPLASTY W CP BYPASS  1994    Christiano: Repair    REPAIR ROTATOR CUFF,CHRONIC Left     torn and had a ruptured bicep    VALVE REPAIR  1994    mitral valve     Family History   Problem Relation Age of Onset    Hypertension Father     Alcohol and Other Disorders Associated Father     Substance Abuse Father         cocaine    Dementia Father     Cancer Father         lung    Diabetes Mother     Cancer Mother         multiple myeloma    Hypertension Mother     Anxiety Maternal Aunt     Depression Maternal Aunt     Anxiety Maternal Aunt     Depression Maternal Aunt     Bipolar Disorder Maternal Aunt     Diabetes Maternal Grandmother     Hypertension Maternal Grandmother     Cancer Maternal Grandfather         stomach cancer    Diabetes Maternal Grandfather     Hypertension Maternal Grandfather     Alcohol and Other Disorders Associated Maternal Grandfather     Hypertension  Paternal Grandmother     Hypertension Paternal Grandfather     Cancer Sister         uterine and ovarian    Hypertension Sister     Cancer Maternal Uncle         lung    Cancer Paternal Aunt         throat      reports that he quit smoking about 2 years ago. His smoking use included cigarettes. He has a 27 pack-year smoking history. He has never used smokeless tobacco. He reports that he does not drink alcohol and does not use drugs.    ALLERGIES:     Allergies   Allergen Reactions    Hydrochlorothiazide RASH and HIVES       MEDICATIONS:     No current facility-administered medications for this encounter.  Prior to Admission Medications   Medication Sig    benzonatate 100 MG Oral Cap Take 1 capsule (100 mg total) by mouth 3 (three) times daily as needed for cough.    tamsulosin 0.4 MG Oral Cap Take 1 capsule (0.4 mg total) by mouth daily.    Lisdexamfetamine Dimesylate 60 MG Oral Cap Take 1 capsule (60 mg total) by mouth every morning.    buPROPion  MG Oral Tablet 24 Hr Take 1 tablet (150 mg total) by mouth daily.    ARIPiprazole 15 MG Oral Tab Take 1 tablet (15 mg total) by mouth daily.    bisacodyl 5 MG Oral Tab EC Take 3 tablets (15 mg total) by mouth daily.    polyethylene glycol, PEG 3350, 17 g Oral Powd Pack Take 17 g by mouth in the morning and 17 g before bedtime.    lamoTRIgine (LAMICTAL) 150 MG Oral Tab Take 1 tablet (150 mg total) by mouth daily.    FLUoxetine HCl 40 MG Oral Cap Take 1 capsule (40 mg total) by mouth daily.    Hyoscyamine Sulfate 0.125 MG Oral Tab Take 1 tablet (0.125 mg total) by mouth every 6 (six) hours as needed for Cramping.    potassium chloride 20 MEQ Oral Tab CR Take 1 tablet (20 mEq total) by mouth daily.    RENVELA 800 MG Oral Tab Take 1 tablet (800 mg total) by mouth 3 (three) times daily with meals.    losartan 100 MG Oral Tab Take 1 tablet (100 mg total) by mouth daily.    furosemide 80 MG Oral Tab Take 1 tablet (80 mg total) by mouth 2 (two) times daily.    hydrALAZINE  25 MG Oral Tab Take 1 tablet (25 mg total) by mouth in the morning and 1 tablet (25 mg total) before bedtime.    NIFEdipine ER 60 MG Oral Tablet 24 Hr Take 1 tablet (60 mg total) by mouth in the morning and 1 tablet (60 mg total) before bedtime.    isosorbide mononitrate ER 60 MG Oral Tablet 24 Hr Take 1 tablet (60 mg total) by mouth daily.    cloNIDine 0.1 MG Oral Tab Take 1 tablet (0.1 mg total) by mouth 2 (two) times daily.    ergocalciferol 1.25 MG (11064 UT) Oral Cap Take 1 capsule (50,000 Units total) by mouth Every Monday.    carvedilol 25 MG Oral Tab Take 1 tablet (25 mg total) by mouth in the morning and 1 tablet (25 mg total) in the evening. Take with meals.    Tiotropium Bromide Monohydrate (SPIRIVA HANDIHALER) 18 MCG Inhalation Cap 1 capsule (18 mcg total) daily.    albuterol 108 (90 Base) MCG/ACT Inhalation Aero Soln albuterol sulfate HFA 90 mcg/actuation aerosol inhaler   INHALE 1 TO 2 PUFFS BY MOUTH EVERY 4-6 HOURS AS NEEDED FOR COUGH OR WHEEZING    Fenofibrate 134 MG Oral Cap Take 1 capsule by mouth nightly.    TRULICITY 0.75 MG/0.5ML Subcutaneous Solution Pen-injector once a week. EVERY MONDAY    acetaminophen 500 MG Oral Tab Take 1 tablet (500 mg total) by mouth every 6 (six) hours as needed for Pain.    Fluticasone Propionate 50 MCG/ACT Nasal Suspension SPRAY ONCE INTO EACH NOSTRIL BID PRN    NIFEdipine ER 30 MG Oral Tablet 24 Hr Take 1 tablet (30 mg total) by mouth at bedtime. (Patient not taking: Reported on 3/8/2024)         PHYSICAL EXAM:     Vital Signs: BP (!) 143/100   Pulse 90   Temp 97.5 °F (36.4 °C)   Resp 20   Ht 6' 2\" (1.88 m)   Wt 250 lb (113.4 kg)   SpO2 97%   BMI 32.10 kg/m²   Temp (24hrs), Av.5 °F (36.4 °C), Min:97.5 °F (36.4 °C), Max:97.5 °F (36.4 °C)     No intake or output data in the 24 hours ending 24 1527  Wt Readings from Last 3 Encounters:   24 250 lb (113.4 kg)   24 250 lb (113.4 kg)   24 250 lb (113.4 kg)       General: NAD  HEENT: NCAT,  MMM, EOMI  Neck: Supple   Cardiac: Regular rate and rhythm   Lungs: CTAB   Abdomen: Soft, non-tender, non-distended   : No CVA tenderness  Extremities: no leg edema  Neurologic/Psych: mentating well, no asterixis  Skin: No rashes    LABORATORY DATA:       Lab Results   Component Value Date     (H) 03/08/2024    BUN 80 (H) 03/08/2024    BUNCREA 13.0 03/07/2022    CREATSERUM 7.79 (H) 03/08/2024    ANIONGAP 8 03/08/2024    GFR 59 (L) 01/04/2018    GFRNAA 30 (L) 07/12/2022    GFRAA 35 (L) 07/12/2022    CA 7.4 (L) 03/08/2024    OSMOCALC 319 (H) 03/08/2024    ALKPHO 92 03/08/2024    AST 47 (H) 03/08/2024    ALT 30 03/08/2024    BILT 0.6 03/08/2024    TP 7.2 03/08/2024    ALB 3.4 03/08/2024    GLOBULIN 3.8 03/08/2024     03/08/2024    K 4.1 03/08/2024     03/08/2024    CO2 21.0 03/08/2024     Lab Results   Component Value Date    WBC 9.6 03/08/2024    RBC 3.18 (L) 03/08/2024    HGB 9.8 (L) 03/08/2024    HCT 29.2 (L) 03/08/2024    .0 03/08/2024    MPV 11.5 12/14/2012    MCV 91.8 03/08/2024    MCH 30.8 03/08/2024    MCHC 33.6 03/08/2024    RDW 13.9 03/08/2024    NEPRELIM 7.38 03/08/2024    NEPERCENT 77.2 03/08/2024    LYPERCENT 11.7 03/08/2024    MOPERCENT 7.0 03/08/2024    EOPERCENT 2.8 03/08/2024    BAPERCENT 1.0 03/08/2024    NE 7.38 03/08/2024    LYMABS 1.12 03/08/2024    MOABSO 0.67 03/08/2024    EOABSO 0.27 03/08/2024    BAABSO 0.10 03/08/2024     Lab Results   Component Value Date    MALBP 167.00 05/24/2023    CREUR 142.00 05/24/2023    CREUR 142.00 05/24/2023     Lab Results   Component Value Date    COLORUR Yellow 01/03/2024    CLARITY Clear 01/03/2024    SPECGRAVITY 1.025 01/03/2024    GLUUR Negative 01/03/2024    BILUR Negative 01/03/2024    KETUR Trace (A) 01/03/2024    BLOODURINE Negative 01/03/2024    PHURINE 5.5 01/03/2024    PROUR >=300 (A) 01/03/2024    UROBILINOGEN 0.2 01/03/2024    NITRITE Negative 01/03/2024    LEUUR Negative 01/03/2024    WBCUR 6-10 (A) 01/03/2024    RBCUR 0-2  01/03/2024    EPIUR Few (A) 01/03/2024    BACUR Rare (A) 01/03/2024    CAOXUR Occasional (A) 04/20/2018    HYLUR Present (A) 05/14/2019         IMAGING:     All imaging studies personally reviewed.    XR CHEST AP PORTABLE  (CPT=71045)    Result Date: 3/8/2024  CONCLUSION:  Small right pleural effusion with associated atelectasis.   LOCATION:  [facility]      Dictated by (CST): Duy De La Rosa MD on 3/08/2024 at 1:19 PM     Finalized by (CST): Duy De La Rosa MD on 3/08/2024 at 1:20 PM          ASSESSMENT/PLAN:   Godwin Fonseca is a a(n) 43 year old male w ESRD on HD TThS, bipolar, HTN, dCHF, COPD, ho DVT, sp MV repair who presents with SOB and neck pain.     ESRD  -- schedule TThS  -- access: perm cath   -- center [facility]  -- missed HD x 2 PTA. Per nephrology notes states poor compliance with dialysis    -- sp HD overnight w 3K, UF 3L. May need additional HD Sun or Mon given missed sessions and BP if remains in house  -- avoid morphine and pavan. Renally dose meds     Anemia  -- transfusion prn. Epo for hgb < 10 if BPs acceptable     HTN  -- UF w HD. Continue home BP meds. Cards on consult. Getting ECHO   -- per outpatient nephrology notes -- notes BP better controlled in hospital and concerned re compliance w meds. Per notes was on = coreg 25 BID, clonidine 0.1 at bedtime, lasix 80 daily, hydralazine 50 BID, imdur 30 daily, losartan 100 daily, nifedipine ER 60 BID    Hyperphosphatemia  -- sevelamer     D/w Dr Lew    Thank you for allowing me to participate in the care of your patient. Please do not hesitate to contact me with concerns or questions.    Samaria Nava MD  [facility]

## 2024-03-08 NOTE — ED PROVIDER NOTES
Patient Seen in: [facility] In [place]      History     Chief Complaint   Patient presents with    Difficulty Breathing     Stated Complaint: difficulty breathing. missed 2 dialysis days    Subjective:   HPI    43-year-old male complaining shortness of breath patient describes she has had some neck pain somewhat chronic he had some injections a few weeks ago and that seem to worsen the last few days is also had some sinus congestion bilateral earache slight cough and the last couple days feeling short of breath.  He denies any chest pain no vomiting or diarrhea is a dialysis patient he missed his last 2 dialysis his last dialysis was 6 days ago.  He has a history of asthma bipolar disorder COPD    Objective:   Past Medical History:   Diagnosis Date    Asthma ([facility])     Attention deficit hyperactivity disorder (ADHD)     Back problem     Bipolar 1 disorder ([facility])     Cancer ([facility])     CKD (chronic kidney disease) stage 3, GFR 30-59 ml/min ([facility])     Dr Meeks    Congestive heart disease ([facility])     COPD (chronic obstructive pulmonary disease) ([facility])     Coronary atherosclerosis     Deep vein thrombosis ([facility])     at age 17 R/T cast    Depression     Diabetes ([facility])     Essential hypertension 2014    3/21 echo: severe concentric LVH with normal EF and no MR or pHTN    Extrinsic asthma, unspecified     Heart attack ([facility])     2016- angiogram- no intervention    Heart valve disease     mitral valve repair in 1994/    High blood pressure     High cholesterol     History of mitral valve repair 12/2022    Hyperlipidemia     Low back pain     tight and stiff after sweeping and mopping    LVH (left ventricular hypertrophy)     Migraines     Mixed hyperlipidemia 03/2021     HDL 38 LDL 97 VLDL 57     Monoclonal gammopathy     IgG kappa     MVP (mitral valve prolapse)     Repair 1994 at [facility]; echoes as recently as 3/21 show mild or trivial MR and no stenosis    Neuropathy     Osteoarthritis     hip  ,knees    Pneumonia due to organism     Pulmonary embolism (HCC) 2023    Renal disorder     TIA (transient ischemic attack) 2021    Initial history of left-sided weakness and slurred speech. (+) cocaine. MRI of the brain, CT angiogram of the head and neck, and 2D echo are all unremarkable.     TMJ (dislocation of temporomandibular joint)     Troponin level elevated 2021    Trop 60 60 47 with TIA and no CP: Lexiscan negative with EF 51              Past Surgical History:   Procedure Laterality Date    COLONOSCOPY N/A 2023    Procedure: COLONOSCOPY;  Surgeon: Heath Vu MD;  Location:  ENDOSCOPY    COLONOSCOPY N/A 2023    Procedure: COLONOSCOPY with cold snare polypectomy and forcep polypectomy;  Surgeon: Ousmane Suarez MD;  Location:  ENDOSCOPY    COLONOSCOPY & POLYPECTOMY  2019    EGD  2019    Duodenitis. Biopsied. EUS for weight loss was negative    HEART SURGERY      HERNIA SURGERY  2022    Dr Barnes    LAMINECTOMY,>2 SGMT,LUMBAR  2018    L4-L5 Decomp Discectomy ROEM L4-L5    MITRALPLASTY W CP BYPASS      [facility]: Repair    REPAIR ROTATOR CUFF,CHRONIC Left     torn and had a ruptured bicep    VALVE REPAIR      mitral valve                Social History     Socioeconomic History    Marital status:    Tobacco Use    Smoking status: Former     Packs/day: 1.00     Years: 27.00     Additional pack years: 0.00     Total pack years: 27.00     Types: Cigarettes     Quit date: 2022     Years since quittin.0    Smokeless tobacco: Never   Vaping Use    Vaping Use: Never used   Substance and Sexual Activity    Alcohol use: No    Drug use: No     Social Determinants of Health     Food Insecurity: No Food Insecurity (3/8/2024)    Food Insecurity     Food Insecurity: Never true   Transportation Needs: No Transportation Needs (3/8/2024)    Transportation Needs     Lack of Transportation: No   Housing Stability: Low Risk  (3/8/2024)    Housing Stability     Housing  Instability: No              Review of Systems    Positive for stated complaint: difficulty breathing. missed 2 dialysis days  Other systems are as noted in HPI.  Constitutional and vital signs reviewed.      All other systems reviewed and negative except as noted above.    Physical Exam     ED Triage Vitals   BP 03/08/24 1237 (!) 188/123   Pulse 03/08/24 1237 98   Resp 03/08/24 1237 22   Temp 03/08/24 1237 97.5 °F (36.4 °C)   Temp src 03/08/24 1726 Oral   SpO2 03/08/24 1237 99 %   O2 Device 03/08/24 1237 None (Room air)       Current:BP (!) 161/112 (BP Location: Left arm)   Pulse 98   Temp 97.8 °F (36.6 °C) (Oral)   Resp 25   Ht 188 cm (6' 2\")   Wt 106.8 kg   SpO2 95%   BMI 30.23 kg/m²         Physical Exam    Patient is alert orient x 3 appears uncomfortable HEENT exam pupils are equal round react light extract muscles are intact oropharynx clear tympanic memories are on the neck is supple no nuchal rigidity lymphadenopathy there is some tenderness in the posterior aspect of the neck there is no nuchal rigidity lungs are clear cardiovascular exam shows regular rate and rhythm without murmurs abdomen is soft and nontender extremities there is trace edema  Neuroexam    ED Course     Labs Reviewed   COMP METABOLIC PANEL (14) - Abnormal; Notable for the following components:       Result Value    Glucose 189 (*)     BUN 80 (*)     Creatinine 7.79 (*)     Calcium, Total 7.4 (*)     Calculated Osmolality 319 (*)     eGFR-Cr 8 (*)     AST 47 (*)     A/G Ratio 0.9 (*)     All other components within normal limits   TROPONIN I HIGH SENSITIVITY - Abnormal; Notable for the following components:    Troponin I (High Sensitivity) 345 (*)     All other components within normal limits   PRO BETA NATRIURETIC PEPTIDE - Abnormal; Notable for the following components:    Pro-Beta Natriuretic Peptide 14,247 (*)     All other components within normal limits   CBC W/ DIFFERENTIAL - Abnormal; Notable for the following components:     RBC 3.18 (*)     HGB 9.8 (*)     HCT 29.2 (*)     All other components within normal limits   POCT GLUCOSE - Normal   POCT FLU TEST - Normal    Narrative:     This assay is a rapid molecular in vitro test utilizing nucleic acid amplification of influenza A and B viral RNA.   RAPID SARS-COV-2 BY PCR - Normal   CBC WITH DIFFERENTIAL WITH PLATELET    Narrative:     The following orders were created for panel order CBC With Differential With Platelet.  Procedure                               Abnormality         Status                     ---------                               -----------         ------                     CBC W/ DIFFERENTIAL[[id number]]          Abnormal            Final result                 Please view results for these tests on the individual orders.   LIPID PANEL   RAINBOW DRAW BLUE     EKG    Rate, intervals and axes as noted on EKG Report.  Rate: 93  Rhythm: Sinus Rhythm  Reading: Left ventricular hypertrophy prolonged QT this is an abnormal EKG               XR CHEST AP PORTABLE  (CPT=71045)    Result Date: 3/8/2024  CONCLUSION:  Small right pleural effusion with associated atelectasis.   LOCATION:  [facility]      Dictated by (CST): Duy De La Rosa MD on 3/08/2024 at 1:19 PM     Finalized by (CST): Duy De La Rosa MD on 3/08/2024 at 1:20 PM      Images independently reviewed no pneumonia.  Labs showed elevated troponin of approximately 345 the proBNP is 14,000 BUN 80 creatinine 7.79 hemoglobin was 9.8         MDM      Initial differential diagnosis considered but not limited to includes ACS CHF pneumonia renal failure fluid overload hypertensive emergency    Patient has significant elevated blood pressure emergency department was treated with 2 doses labetalol as well as hydralazine chest x-ray did not show any fluid overload however it is concerning that he has not had dialysis for several days Case was discussed with cardiology as well as nephrology and the hospitalist the patient had previously  elevated troponins and had a relatively normal cardiac cath a year or so ago.  Patient was admitted for further evaluation dialysis and evaluation by cardiology.  Admission disposition: 3/8/2024  3:23 PM         A total of 35 minutes of critical care time (exclusive of billable procedures) was administered to manage the patient's unstable vital signs due to his elevated blood pressure elevated troponin.  This involved direct patient intervention, complex decision making, and/or extensive discussions with the patient, family, and clinical staff.                                 Medical Decision Making      Disposition and Plan     Clinical Impression:  1. Elevated troponin    2. Hypertensive urgency    3. Neck pain         Disposition:  Admit  3/8/2024  3:23 pm    Follow-up:  No follow-up provider specified.        Medications Prescribed:  Current Discharge Medication List                            Hospital Problems       Present on Admission  Date Reviewed: 1/3/2024            ICD-10-CM Noted POA    * (Principal) Elevated troponin R79.89 6/10/2019 Unknown    Acute renal failure (ARF) (HCC) N17.9 3/8/2024 Yes    Anemia D64.9 3/8/2024 Yes    Hypertensive urgency I16.0 11/29/2020 Unknown    Neck pain M54.2 3/8/2024 Unknown

## 2024-03-08 NOTE — ED QUICK NOTES
Orders for admission, patient is aware of plan and ready to go upstairs. Any questions, please call ED JASON Alexander at extension [phone].     Patient Covid vaccination status: Fully vaccinated     COVID Test Ordered in ED: Rapid SARS-CoV-2 by PCR    COVID Suspicion at Admission: N/A    Running Infusions:  None    Mental Status/LOC at time of transport: AOx3    Other pertinent information:   CIWA score: N/A   NIH score:  N/A
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
## 2024-03-08 NOTE — H&P
[facility] History and Physical     Godwin Fonseca Patient Status:  Inpatient    1980 MRN [id number]   [facility] 2NE-A Attending Velma Goodman, *   Hosp Day # 0 PCP Prieto Novak MD     Chief Complaint:  sob    Subjective:    Godwin Fonseca is a 43 year old male with mmp including but not limited to end-stage renal disease on HD, HFpEF, diabetes mellitus type 2, COPD, chronic abdominal pain and depression/bipolar/ADHD with multiple ER/hospital admissions, is admitted for sob. Also noting neck,top of head pain which started yesterday- says had cervical nerve block at OSH recently and thinks worn off. No n/v/f/c.     History/Other:        History/Other:    Past Medical History:  Past Medical History:   Diagnosis Date    Asthma (HCC)     Attention deficit hyperactivity disorder (ADHD)     Back problem     Bipolar 1 disorder (HCC)     Cancer (HCC)     CKD (chronic kidney disease) stage 3, GFR 30-59 ml/min (HCC)     Dr Meeks    Congestive heart disease (HCC)     COPD (chronic obstructive pulmonary disease) (HCC)     Coronary atherosclerosis     Deep vein thrombosis (HCC)     at age 17 R/T cast    Depression     Diabetes (HCC)     Essential hypertension 2014    3/21 echo: severe concentric LVH with normal EF and no MR or pHTN    Extrinsic asthma, unspecified     Heart attack (HCC)     - angiogram- no intervention    Heart valve disease     mitral valve repair in /    High blood pressure     High cholesterol     History of mitral valve repair 2022    Hyperlipidemia     Low back pain     tight and stiff after sweeping and mopping    LVH (left ventricular hypertrophy)     Migraines     Mixed hyperlipidemia 2021     HDL 38 LDL 97 VLDL 57     Monoclonal gammopathy     IgG kappa     MVP (mitral valve prolapse)     Repair  at [place]; echoes as recently as 3/21 show mild or trivial MR and no stenosis    Neuropathy     Osteoarthritis     hip ,knees     Pneumonia due to organism     Pulmonary embolism (HCC) 01/2023    Renal disorder     TIA (transient ischemic attack) 03/2021    Initial history of left-sided weakness and slurred speech. (+) cocaine. MRI of the brain, CT angiogram of the head and neck, and 2D echo are all unremarkable.     TMJ (dislocation of temporomandibular joint)     Troponin level elevated 03/2021    Trop 60 60 47 with TIA and no CP: Lexiscan negative with EF 51     Past Surgical History:   Past Surgical History:   Procedure Laterality Date    COLONOSCOPY N/A 03/26/2023    Procedure: COLONOSCOPY;  Surgeon: Heath Vu MD;  Location:  ENDOSCOPY    COLONOSCOPY N/A 12/30/2023    Procedure: COLONOSCOPY with cold snare polypectomy and forcep polypectomy;  Surgeon: Ousmane Suarez MD;  Location:  ENDOSCOPY    COLONOSCOPY & POLYPECTOMY  2019    EGD  2019    Duodenitis. Biopsied. EUS for weight loss was negative    HEART SURGERY      HERNIA SURGERY  08/17/2022    Dr Barnes    LAMINECTOMY,>2 SGMT,LUMBAR  09/06/2018    L4-L5 Decomp Discectomy ROEM L4-L5    MITRALPLASTY W CP BYPASS  1994    [place]: Repair    REPAIR ROTATOR CUFF,CHRONIC Left     torn and had a ruptured bicep    VALVE REPAIR  1994    mitral valve      Family History:   Family History   Problem Relation Age of Onset    Hypertension Father     Alcohol and Other Disorders Associated Father     Substance Abuse Father         cocaine    Dementia Father     Cancer Father         lung    Diabetes Mother     Cancer Mother         multiple myeloma    Hypertension Mother     Anxiety Maternal Aunt     Depression Maternal Aunt     Anxiety Maternal Aunt     Depression Maternal Aunt     Bipolar Disorder Maternal Aunt     Diabetes Maternal Grandmother     Hypertension Maternal Grandmother     Cancer Maternal Grandfather         stomach cancer    Diabetes Maternal Grandfather     Hypertension Maternal Grandfather     Alcohol and Other Disorders Associated Maternal Grandfather     Hypertension  Paternal Grandmother     Hypertension Paternal Grandfather     Cancer Sister         uterine and ovarian    Hypertension Sister     Cancer Maternal Uncle         lung    Cancer Paternal Aunt         throat     Social History:    reports that he quit smoking about 2 years ago. His smoking use included cigarettes. He has a 27 pack-year smoking history. He has never used smokeless tobacco. He reports that he does not drink alcohol and does not use drugs.       Allergies:   Allergies   Allergen Reactions    Hydrochlorothiazide RASH and HIVES       Medications:    Current Facility-Administered Medications on File Prior to Encounter   Medication Dose Route Frequency Provider Last Rate Last Admin    [COMPLETED] labetalol (Trandate) 5 mg/mL injection 10 mg  10 mg Intravenous Once Jhonny Rebolledo MD   10 mg at 24 0357    [COMPLETED] butalbital-acetaminophen-caffeine (Fioricet) -40 MG per tab 1 tablet  1 tablet Oral Once Jhonny Rebolledo MD   1 tablet at 24 0245    [COMPLETED] acetaminophen (Tylenol Extra Strength) 500 MG tab        Given at 24 0300    [] sodium chloride 0.9 % IV bolus 100 mL  100 mL Intravenous Q30 Min PRN Toni Ali, DO        And    [] albumin human (Albumin) 25% injection 25 g  25 g Intravenous PRN [facility] Toni, Ali, DO        [COMPLETED] heparin (Porcine) 1000 UNIT/ML injection 2,000 Units  2,000 Units Intravenous Once Toni Ali, DO   2,000 Units at 24 1820    [COMPLETED] hydrALAzine (Apresoline) 20 mg/mL injection 10 mg  10 mg Intravenous Once Jhonny Rebolledo MD   10 mg at 24 2225    [COMPLETED] sodium chloride 0.9 % IV bolus 500 mL  500 mL Intravenous Once Davis Bob MD   Stopped at 24 2137    [COMPLETED] acetaminophen (Tylenol Extra Strength) tab 1,000 mg  1,000 mg Oral Once Davis Bob MD   1,000 mg at 24 2300    [COMPLETED] morphINE PF 4 MG/ML injection 2 mg  2 mg Intravenous Once Davis Bob MD   2 mg at 24  2300    [COMPLETED] polyethylene glycol-electrolyte (Golytely) 236 g oral solution 4,000 mL  4,000 mL Oral Once Ousmane Suarez MD   4,000 mL at 12/29/23 1220    [COMPLETED] morphINE PF 4 MG/ML injection 4 mg  4 mg Intravenous Once Davis Bob MD   4 mg at 12/28/23 0841    [COMPLETED] cloNIDine (Catapres) tab 0.1 mg  0.1 mg Oral Once Davis Bob MD   0.1 mg at 12/28/23 1157    [COMPLETED] labetalol (Trandate) 5 mg/mL injection 20 mg  20 mg Intravenous Once Davis Bob MD   20 mg at 12/28/23 1039    [COMPLETED] polyethylene glycol-electrolyte (Golytely) 236 g oral solution 4,000 mL  4,000 mL Oral Once Ousmane Suarez MD   2,000 mL at 12/29/23 0534     Current Outpatient Medications on File Prior to Encounter   Medication Sig Dispense Refill    benzonatate 100 MG Oral Cap Take 1 capsule (100 mg total) by mouth 3 (three) times daily as needed for cough. 30 capsule 0    tamsulosin 0.4 MG Oral Cap Take 1 capsule (0.4 mg total) by mouth daily.      Lisdexamfetamine Dimesylate 60 MG Oral Cap Take 1 capsule (60 mg total) by mouth every morning.      buPROPion  MG Oral Tablet 24 Hr Take 1 tablet (150 mg total) by mouth daily.      ARIPiprazole 15 MG Oral Tab Take 1 tablet (15 mg total) by mouth daily.      bisacodyl 5 MG Oral Tab EC Take 3 tablets (15 mg total) by mouth daily. 30 tablet 0    polyethylene glycol, PEG 3350, 17 g Oral Powd Pack Take 17 g by mouth in the morning and 17 g before bedtime. 60 packet 0    lamoTRIgine (LAMICTAL) 150 MG Oral Tab Take 1 tablet (150 mg total) by mouth daily. 7 tablet 0    FLUoxetine HCl 40 MG Oral Cap Take 1 capsule (40 mg total) by mouth daily. 7 capsule 0    Hyoscyamine Sulfate 0.125 MG Oral Tab Take 1 tablet (0.125 mg total) by mouth every 6 (six) hours as needed for Cramping.      potassium chloride 20 MEQ Oral Tab CR Take 1 tablet (20 mEq total) by mouth daily.      RENVELA 800 MG Oral Tab Take 1 tablet (800 mg total) by mouth 3 (three) times daily with  meals.      losartan 100 MG Oral Tab Take 1 tablet (100 mg total) by mouth daily.      furosemide 80 MG Oral Tab Take 1 tablet (80 mg total) by mouth 2 (two) times daily.      hydrALAZINE 25 MG Oral Tab Take 1 tablet (25 mg total) by mouth in the morning and 1 tablet (25 mg total) before bedtime. 30 tablet 0    NIFEdipine ER 60 MG Oral Tablet 24 Hr Take 1 tablet (60 mg total) by mouth in the morning and 1 tablet (60 mg total) before bedtime.      isosorbide mononitrate ER 60 MG Oral Tablet 24 Hr Take 1 tablet (60 mg total) by mouth daily. 30 tablet 0    cloNIDine 0.1 MG Oral Tab Take 1 tablet (0.1 mg total) by mouth 2 (two) times daily. 60 tablet 0    ergocalciferol 1.25 MG (26875 UT) Oral Cap Take 1 capsule (50,000 Units total) by mouth Every Monday.      carvedilol 25 MG Oral Tab Take 1 tablet (25 mg total) by mouth in the morning and 1 tablet (25 mg total) in the evening. Take with meals. 60 tablet 6    Tiotropium Bromide Monohydrate (SPIRIVA HANDIHALER) 18 MCG Inhalation Cap 1 capsule (18 mcg total) daily.      albuterol 108 (90 Base) MCG/ACT Inhalation Aero Soln albuterol sulfate HFA 90 mcg/actuation aerosol inhaler   INHALE 1 TO 2 PUFFS BY MOUTH EVERY 4-6 HOURS AS NEEDED FOR COUGH OR WHEEZING      Fenofibrate 134 MG Oral Cap Take 1 capsule by mouth nightly. 90 capsule 1    TRULICITY 0.75 MG/0.5ML Subcutaneous Solution Pen-injector once a week. EVERY MONDAY      acetaminophen 500 MG Oral Tab Take 1 tablet (500 mg total) by mouth every 6 (six) hours as needed for Pain.      Fluticasone Propionate 50 MCG/ACT Nasal Suspension SPRAY ONCE INTO EACH NOSTRIL BID PRN 15.8 mL 0    NIFEdipine ER 30 MG Oral Tablet 24 Hr Take 1 tablet (30 mg total) by mouth at bedtime. (Patient not taking: Reported on 3/8/2024)         Review of Systems:   A comprehensive 14 point review of systems was completed.    Pertinent positives and negatives noted in the HPI.    Objective:     BP (!) 143/100   Pulse 90   Temp 97.5 °F (36.4 °C)    Resp 20   Ht 6' 2\" (1.88 m)   Wt 250 lb (113.4 kg)   SpO2 97%   BMI 32.10 kg/m²      General:  Alert, uncomfortable with pain- asking for pain med, appears stated age, no accessory m use,   Head:  Normocephalic, without obvious abnormality, atraumatic.   Eyes:  Sclera anicteric,  EOMs intact. Lids wnl.    Ears, nose, throat:  external ears and nose within normal limits, hearing intact       Neck: Supple, symmetrical   Lungs:   Diminished, ok effort   Chest wall:  No tenderness or deformity.   Heart:  Regular rate and rhythm, S1, S2 normal,no LE edema   Abdomen:   Soft, non-tender. Bowel sounds normal. . Non distended, no peritoneal signs   Extremities: Extremities normal, atraumatic, no edema.   Skin: Skin color, texture, turgor normal. No visible rashes or lesions.    Neurologic:  Psychiatric: No facial droop   appropriate affect,  answering questions ok       Results:         CBC/Chem  Recent Labs   Lab 03/08/24  1302   WBC 9.6   HGB 9.8*   MCV 91.8   .0       Recent Labs   Lab 03/08/24  1302      K 4.1      CO2 21.0   BUN 80*   CREATSERUM 7.79*   *   CA 7.4*       Recent Labs   Lab 03/08/24  1302   ALT 30   AST 47*   ALB 3.4          Additional Diagnostics: ECG:  NSR HR 93    CXR: image personally reviewed  agree with radiologist read    Radiology: XR CHEST AP PORTABLE  (CPT=71045)    Result Date: 3/8/2024   Small right pleural effusion with associated atelectasis         COVID-19  Lab Results   Component Value Date    COVID19 Not Detected 03/08/2024    COVID19 Detected (A) 01/03/2024    COVID19 Not Detected 11/23/2023       Cardiac  Recent Labs   Lab 03/08/24  1302   PBNP 14,247*       Assessment & Plan:        43 year old male with mmp including but not limited to end-stage renal disease on HD, HFpEF, diabetes mellitus type 2, COPD, chronic abdominal pain and depression/bipolar/ADHD with multiple ER/hospital admissions, is admitted for sob.      **sob  **acute on chronic HFpEF in  setting of   **ESRD on HD, missed a few sessions of HD  -cardiology and renal on consult, appreciate  -HD per renal  -covid neg, bnp 14k. Trop elevated but with esrd    **hypertensive urgency- continue home meds. On array of antihypertensives already. Suspect neck pain exacerbating    **chronic neck pain, top of head sp cervical nerve block  -follows with pain clinic outpatient  -IV dilaudid prn, norco prn, antiemetics, bowel regimen       DM type 2 - ISS/accucheks  COPD -home med  Hyperlipidemia -home med  Depression/bipolar/ADHD - home meds     PPx-heparin subcutaneous    Dw pt and ED MD    Thank You,  Velma Goodman MD    [facility] Hospitalist  Internal Medicine  Answering Service number: [phone]    3/8/2024    Outpatient records or previous hospital records reviewed.     Further recommendations pending patient's clinical course.  [facility] hospitalist to continue to follow patient while in house    Patient and/or patient's family given opportunity to ask questions and note understanding and agreeing with therapeutic plan as outlined    Supplementary Documentation:

## 2024-03-09 LAB
ADENOVIRUS PCR:: NOT DETECTED
ALBUMIN SERPL-MCNC: 3.6 G/DL (ref 3.4–5)
ANION GAP SERPL CALC-SCNC: 7 MMOL/L (ref 0–18)
B PARAPERT DNA SPEC QL NAA+PROBE: NOT DETECTED
B PERT DNA SPEC QL NAA+PROBE: NOT DETECTED
BASOPHILS # BLD AUTO: 0.1 X10(3) UL (ref 0–0.2)
BASOPHILS NFR BLD AUTO: 0.9 %
BUN BLD-MCNC: 40 MG/DL (ref 9–23)
C PNEUM DNA SPEC QL NAA+PROBE: NOT DETECTED
CALCIUM BLD-MCNC: 8.8 MG/DL (ref 8.5–10.1)
CHLORIDE SERPL-SCNC: 104 MMOL/L (ref 98–112)
CO2 SERPL-SCNC: 25 MMOL/L (ref 21–32)
CORONAVIRUS 229E PCR:: NOT DETECTED
CORONAVIRUS HKU1 PCR:: NOT DETECTED
CORONAVIRUS NL63 PCR:: NOT DETECTED
CORONAVIRUS OC43 PCR:: NOT DETECTED
CREAT BLD-MCNC: 4.97 MG/DL
EGFRCR SERPLBLD CKD-EPI 2021: 14 ML/MIN/1.73M2 (ref 60–?)
EOSINOPHIL # BLD AUTO: 0.34 X10(3) UL (ref 0–0.7)
EOSINOPHIL NFR BLD AUTO: 3.2 %
ERYTHROCYTE [DISTWIDTH] IN BLOOD BY AUTOMATED COUNT: 13.8 %
EST. AVERAGE GLUCOSE BLD GHB EST-MCNC: 85 MG/DL (ref 68–126)
FLUAV RNA SPEC QL NAA+PROBE: NOT DETECTED
FLUBV RNA SPEC QL NAA+PROBE: NOT DETECTED
GLUCOSE BLD-MCNC: 114 MG/DL (ref 70–99)
GLUCOSE BLD-MCNC: 125 MG/DL (ref 70–99)
GLUCOSE BLD-MCNC: 128 MG/DL (ref 70–99)
GLUCOSE BLD-MCNC: 139 MG/DL (ref 70–99)
GLUCOSE BLD-MCNC: 163 MG/DL (ref 70–99)
HBA1C MFR BLD: 4.6 % (ref ?–5.7)
HCT VFR BLD AUTO: 29.8 %
HGB BLD-MCNC: 9.9 G/DL
IMM GRANULOCYTES # BLD AUTO: 0.03 X10(3) UL (ref 0–1)
IMM GRANULOCYTES NFR BLD: 0.3 %
LYMPHOCYTES # BLD AUTO: 1.14 X10(3) UL (ref 1–4)
LYMPHOCYTES NFR BLD AUTO: 10.8 %
MAGNESIUM SERPL-MCNC: 2 MG/DL (ref 1.6–2.6)
MCH RBC QN AUTO: 30 PG (ref 26–34)
MCHC RBC AUTO-ENTMCNC: 33.2 G/DL (ref 31–37)
MCV RBC AUTO: 90.3 FL
METAPNEUMOVIRUS PCR:: NOT DETECTED
MONOCYTES # BLD AUTO: 0.8 X10(3) UL (ref 0.1–1)
MONOCYTES NFR BLD AUTO: 7.6 %
MYCOPLASMA PNEUMONIA PCR:: NOT DETECTED
NEUTROPHILS # BLD AUTO: 8.17 X10 (3) UL (ref 1.5–7.7)
NEUTROPHILS # BLD AUTO: 8.17 X10(3) UL (ref 1.5–7.7)
NEUTROPHILS NFR BLD AUTO: 77.2 %
OSMOLALITY SERPL CALC.SUM OF ELEC: 293 MOSM/KG (ref 275–295)
PARAINFLUENZA 1 PCR:: NOT DETECTED
PARAINFLUENZA 2 PCR:: NOT DETECTED
PARAINFLUENZA 3 PCR:: NOT DETECTED
PARAINFLUENZA 4 PCR:: NOT DETECTED
PHOSPHATE SERPL-MCNC: 4.1 MG/DL (ref 2.5–4.9)
PLATELET # BLD AUTO: 212 10(3)UL (ref 150–450)
POTASSIUM SERPL-SCNC: 3.6 MMOL/L (ref 3.5–5.1)
RBC # BLD AUTO: 3.3 X10(6)UL
RHINOVIRUS/ENTERO PCR:: NOT DETECTED
RSV RNA SPEC QL NAA+PROBE: NOT DETECTED
SARS-COV-2 RNA NPH QL NAA+NON-PROBE: NOT DETECTED
SODIUM SERPL-SCNC: 136 MMOL/L (ref 136–145)
TROPONIN I SERPL HS-MCNC: 252 NG/L
WBC # BLD AUTO: 10.6 X10(3) UL (ref 4–11)

## 2024-03-09 PROCEDURE — 85025 COMPLETE CBC W/AUTO DIFF WBC: CPT | Performed by: HOSPITALIST

## 2024-03-09 PROCEDURE — 94799 UNLISTED PULMONARY SVC/PX: CPT

## 2024-03-09 PROCEDURE — 80069 RENAL FUNCTION PANEL: CPT | Performed by: INTERNAL MEDICINE

## 2024-03-09 PROCEDURE — 82962 GLUCOSE BLOOD TEST: CPT

## 2024-03-09 PROCEDURE — 84484 ASSAY OF TROPONIN QUANT: CPT

## 2024-03-09 PROCEDURE — 83735 ASSAY OF MAGNESIUM: CPT | Performed by: INTERNAL MEDICINE

## 2024-03-09 PROCEDURE — 94640 AIRWAY INHALATION TREATMENT: CPT

## 2024-03-09 PROCEDURE — 0202U NFCT DS 22 TRGT SARS-COV-2: CPT | Performed by: HOSPITALIST

## 2024-03-09 RX ORDER — HEPARIN SODIUM 1000 [USP'U]/ML
1.5 INJECTION, SOLUTION INTRAVENOUS; SUBCUTANEOUS
Status: DISCONTINUED | OUTPATIENT
Start: 2024-03-10 | End: 2024-03-10

## 2024-03-09 NOTE — PROGRESS NOTES
[facility] note    PCP: Prieto Novak MD    Chief Complaint:  F/u sob, ha, htn    SUBJECTIVE:  Bp improved  Headache still present. Has sore throat as well  Had 3L removed with HD overnight. Still feels a bit sob.    OBJECTIVE:  Temp:  [96.8 °F (36 °C)-98.2 °F (36.8 °C)] 98.2 °F (36.8 °C)  Pulse:  [] 94  Resp:  [20-25] 21  BP: (143-188)/() 155/98  SpO2:  [93 %-99 %] 94 %    Intake/Output:    Intake/Output Summary (Last 24 hours) at 3/9/2024 1220  Last data filed at 3/9/2024 0922  Gross per 24 hour   Intake 118 ml   Output 3600 ml   Net -3482 ml       Last 3 Weights   03/09/24 0345 243 lb 6.2 oz (110.4 kg)   03/08/24 1730 235 lb 7.2 oz (106.8 kg)   03/08/24 1237 250 lb (113.4 kg)   01/05/24 0640 250 lb (113.4 kg)   01/04/24 0200 258 lb 6.4 oz (117.2 kg)   01/03/24 1856 265 lb (120.2 kg)   01/03/24 1652 250 lb (113.4 kg)       Exam  Gen: No acute distress  HEENT: anicteric sclera, MMM  Pulm: Lungs clear, normal respiratory effort  CV: Heart with regular rate and rhythm, no peripheral edema  Abd: Abdomen soft, nontender, nondistended, no organomegaly, bowel sounds present  MSK: Full range of motion in extremities, no clubbing, no cyanosis  Skin: no rashes or lesions  Neuro: A&OX3, no focal deficits    Data Review:         Labs:     Recent Labs   Lab 03/08/24  1302 03/09/24  0616   WBC 9.6 10.6   HGB 9.8* 9.9*   MCV 91.8 90.3   .0 212.0   NE 7.38 8.17*   LYMABS 1.12 1.14       Recent Labs   Lab 03/08/24  1302 03/09/24  0616    136   K 4.1 3.6    104   CO2 21.0 25.0   BUN 80* 40*   CREATSERUM 7.79* 4.97*   CA 7.4* 8.8   MG  --  2.0   PHOS  --  4.1   * 125*       Recent Labs   Lab 03/08/24  1302 03/09/24  0616   ALT 30  --    AST 47*  --    ALB 3.4 3.6       Recent Labs   Lab 03/08/24  1302   PBNP 14,247*       No results for input(s): \"CRP\", \"HARJEET\", \"LDH\", \"DDIMER\" in the last 168 hours.    Recent Labs   Lab 03/08/24  1732 03/08/24  2302 03/09/24  0609   PGLU 98 123* 114*        Meds:   Scheduled Medication:   heparin  5,000 Units Subcutaneous Q8H MARTHA    ARIPiprazole  15 mg Oral Daily    buPROPion ER  150 mg Oral Daily    carvedilol  25 mg Oral BID with meals    cloNIDine  0.1 mg Oral BID    FLUoxetine HCl  40 mg Oral Daily    furosemide  80 mg Oral BID (Diuretic)    hydrALAZINE  50 mg Oral BID    isosorbide mononitrate ER  30 mg Oral Daily    lamoTRIgine  150 mg Oral Daily    amphetamine-dextroamphetamine  10 mg Oral BID AC    losartan  100 mg Oral Daily    NIFEdipine ER  60 mg Oral BID    sevelamer carbonate  800 mg Oral TID CC    tamsulosin  0.4 mg Oral Daily @ 0700    umeclidinium bromide  1 puff Inhalation Daily    insulin aspart  1-5 Units Subcutaneous TID AC and HS     Continuous Infusing Medication:  PRN Medication:acetaminophen **OR** [DISCONTINUED] HYDROcodone-acetaminophen **OR** [DISCONTINUED] HYDROcodone-acetaminophen, polyethylene glycol (PEG 3350), sennosides, bisacodyl, ondansetron, prochlorperazine, HYDROmorphone **OR** HYDROmorphone **OR** HYDROmorphone, HYDROcodone-acetaminophen, glucose **OR** glucose **OR** glucose-vitamin C **OR** dextrose **OR** glucose **OR** glucose **OR** glucose-vitamin C       Microbiology:    No results found for this visit on 03/08/24.    Lab Results   Component Value Date    COVID19 Not Detected 03/08/2024    COVID19 Detected (A) 01/03/2024    COVID19 Not Detected 11/23/2023        Assessment/Plan:     43 year old male with mmp including but not limited to end-stage renal disease on HD, HFpEF, diabetes mellitus type 2, COPD, chronic abdominal pain and depression/bipolar/ADHD with multiple ER/hospital admissions, is admitted for sob.      **sob  **acute on chronic HFpEF in setting of   **ESRD on HD, missed HD  **?viral syndrome  -cardiology and renal on consult, appreciate  -echo pending  -will send resp viral panel  -plan for additional HD tomorrow.     **hypertensive urgency- significantly improving, may have been exacerbated by  headache/neck pain.     **chronic neck pain, top of head sp cervical nerve block  -follows with pain clinic outpatient  -prn pain meds (has mostly received norco), antiemetics, bowel regimen  -r/o viral syndrome given other sx such as sore throat        DM type 2 - ISS/accucheks  COPD -home med  Hyperlipidemia -home med  Depression/bipolar/ADHD - home meds      PPx-heparin subcutaneous           Laila Ryder MD  On license of [facility] Hospitalist  Pager: [phone]

## 2024-03-09 NOTE — PLAN OF CARE
Assumed care of pt at 1930.  Pt A&Ox 4. On RA. NSR on tele; no c/o cardiac symptoms. Pt oliguric on HD. Pt denies chest pain but c.o discomfort; PRN pain medicine given as ordered, seems to be resting comfortably at this time. Right permacath CDI.    POC HD tonight. Cards eval.       Plan of care reviewed with pt; all questions answered at this time. Bed in lowest position; call light within reach. High fall risk precautions are in place per unit protocol.     2130 HD arrives     Problem: PAIN - ADULT  Goal: Verbalizes/displays adequate comfort level or patient's stated pain goal  Description: INTERVENTIONS:  - Encourage pt to monitor pain and request assistance  - Assess pain using appropriate pain scale  - Administer analgesics based on type and severity of pain and evaluate response  - Implement non-pharmacological measures as appropriate and evaluate response  - Consider cultural and social influences on pain and pain management  - Manage/alleviate anxiety  - Utilize distraction and/or relaxation techniques  - Monitor for opioid side effects  - Notify MD/LIP if interventions unsuccessful or patient reports new pain  - Anticipate increased pain with activity and pre-medicate as appropriate  Outcome: Progressing     Problem: RISK FOR INFECTION - ADULT  Goal: Absence of fever/infection during anticipated neutropenic period  Description: INTERVENTIONS  - Monitor WBC  - Administer growth factors as ordered  - Implement neutropenic guidelines  Outcome: Progressing     Problem: SAFETY ADULT - FALL  Goal: Free from fall injury  Description: INTERVENTIONS:  - Assess pt frequently for physical needs  - Identify cognitive and physical deficits and behaviors that affect risk of falls.  - [place] fall precautions as indicated by assessment.  - Educate pt/family on patient safety including physical limitations  - Instruct pt to call for assistance with activity based on assessment  - Modify environment to reduce risk of  injury  - Provide assistive devices as appropriate  - Consider OT/PT consult to assist with strengthening/mobility  - Encourage toileting schedule  Outcome: Progressing     Problem: DISCHARGE PLANNING  Goal: Discharge to home or other facility with appropriate resources  Description: INTERVENTIONS:  - Identify barriers to discharge w/pt and caregiver  - Include patient/family/discharge partner in discharge planning  - Arrange for needed discharge resources and transportation as appropriate  - Identify discharge learning needs (meds, wound care, etc)  - Arrange for interpreters to assist at discharge as needed  - Consider post-discharge preferences of patient/family/discharge partner  - Complete POLST form as appropriate  - Assess patient's ability to be responsible for managing their own health  - Refer to Case Management Department for coordinating discharge planning if the patient needs post-hospital services based on physician/LIP order or complex needs related to functional status, cognitive ability or social support system  Outcome: Progressing     Problem: CARDIOVASCULAR - ADULT  Goal: Maintains optimal cardiac output and hemodynamic stability  Description: INTERVENTIONS:  - Monitor vital signs, rhythm, and trends  - Monitor for bleeding, hypotension and signs of decreased cardiac output  - Evaluate effectiveness of vasoactive medications to optimize hemodynamic stability  - Monitor arterial and/or venous puncture sites for bleeding and/or hematoma  - Assess quality of pulses, skin color and temperature  - Assess for signs of decreased coronary artery perfusion - ex. Angina  - Evaluate fluid balance, assess for edema, trend weights  Outcome: Progressing  Goal: Absence of cardiac arrhythmias or at baseline  Description: INTERVENTIONS:  - Continuous cardiac monitoring, monitor vital signs, obtain 12 lead EKG if indicated  - Evaluate effectiveness of antiarrhythmic and heart rate control medications as ordered  -  Initiate emergency measures for life threatening arrhythmias  - Monitor electrolytes and administer replacement therapy as ordered  Outcome: Progressing     Problem: RESPIRATORY - ADULT  Goal: Achieves optimal ventilation and oxygenation  Description: INTERVENTIONS:  - Assess for changes in respiratory status  - Assess for changes in mentation and behavior  - Position to facilitate oxygenation and minimize respiratory effort  - Oxygen supplementation based on oxygen saturation or ABGs  - Provide Smoking Cessation handout, if applicable  - Encourage broncho-pulmonary hygiene including cough, deep breathe, Incentive Spirometry  - Assess the need for suctioning and perform as needed  - Assess and instruct to report SOB or any respiratory difficulty  - Respiratory Therapy support as indicated  - Manage/alleviate anxiety  - Monitor for signs/symptoms of CO2 retention  Outcome: Progressing     Problem: METABOLIC/FLUID AND ELECTROLYTES - ADULT  Goal: Glucose maintained within prescribed range  Description: INTERVENTIONS:  - Monitor Blood Glucose as ordered  - Assess for signs and symptoms of hyperglycemia and hypoglycemia  - Administer ordered medications to maintain glucose within target range  - Assess barriers to adequate nutritional intake and initiate nutrition consult as needed  - Instruct patient on self management of diabetes  Outcome: Progressing  Goal: Electrolytes maintained within normal limits  Description: INTERVENTIONS:  - Monitor labs and rhythm and assess patient for signs and symptoms of electrolyte imbalances  - Administer electrolyte replacement as ordered  - Monitor response to electrolyte replacements, including rhythm and repeat lab results as appropriate  - Fluid restriction as ordered  - Instruct patient on fluid and nutrition restrictions as appropriate  Outcome: Progressing

## 2024-03-09 NOTE — PLAN OF CARE
Awake,alert  Denies chest pain, breathing better,room air 94%  C/o neck pain, Norco given, little help per pt  Seen by Dr Reyes, 2D echo ordered  Right internal jugular Perma cath clean dry and intact.  HD finished around 4 am, 3L off  Continue to monitor bp on high side.      Problem: PAIN - ADULT  Goal: Verbalizes/displays adequate comfort level or patient's stated pain goal  Description: INTERVENTIONS:  - Encourage pt to monitor pain and request assistance  - Assess pain using appropriate pain scale  - Administer analgesics based on type and severity of pain and evaluate response  - Implement non-pharmacological measures as appropriate and evaluate response  - Consider cultural and social influences on pain and pain management  - Manage/alleviate anxiety  - Utilize distraction and/or relaxation techniques  - Monitor for opioid side effects  - Notify MD/LIP if interventions unsuccessful or patient reports new pain  - Anticipate increased pain with activity and pre-medicate as appropriate  3/9/2024 1114 by Lisha Govea RN  Outcome: Progressing  3/9/2024 1114 by Lisha Govea RN  Outcome: Progressing     Problem: CARDIOVASCULAR - ADULT  Goal: Maintains optimal cardiac output and hemodynamic stability  Description: INTERVENTIONS:  - Monitor vital signs, rhythm, and trends  - Monitor for bleeding, hypotension and signs of decreased cardiac output  - Evaluate effectiveness of vasoactive medications to optimize hemodynamic stability  - Monitor arterial and/or venous puncture sites for bleeding and/or hematoma  - Assess quality of pulses, skin color and temperature  - Assess for signs of decreased coronary artery perfusion - ex. Angina  - Evaluate fluid balance, assess for edema, trend weights  3/9/2024 1114 by Lisha Govea, RN  Outcome: Progressing  3/9/2024 1114 by Lisha Govea, RN  Outcome: Progressing  Goal: Absence of cardiac arrhythmias or at baseline  Description: INTERVENTIONS:  - Continuous cardiac  monitoring, monitor vital signs, obtain 12 lead EKG if indicated  - Evaluate effectiveness of antiarrhythmic and heart rate control medications as ordered  - Initiate emergency measures for life threatening arrhythmias  - Monitor electrolytes and administer replacement therapy as ordered  3/9/2024 1114 by Lisha Govea RN  Outcome: Progressing  3/9/2024 1114 by Lisha Govea RN  Outcome: Progressing     Problem: METABOLIC/FLUID AND ELECTROLYTES - ADULT  Goal: Glucose maintained within prescribed range  Description: INTERVENTIONS:  - Monitor Blood Glucose as ordered  - Assess for signs and symptoms of hyperglycemia and hypoglycemia  - Administer ordered medications to maintain glucose within target range  - Assess barriers to adequate nutritional intake and initiate nutrition consult as needed  - Instruct patient on self management of diabetes  3/9/2024 1114 by Lisha Govea RN  Outcome: Progressing  3/9/2024 1114 by Lisha Govea RN  Outcome: Progressing  Goal: Hemodynamic stability and optimal renal function maintained  Description: INTERVENTIONS:  - Monitor labs and assess for signs and symptoms of volume excess or deficit  - Monitor intake, output and patient weight  - Monitor urine specific gravity, serum osmolarity and serum sodium as indicated or ordered  - Monitor response to interventions for patient's volume status, including labs, urine output, blood pressure (other measures as available)  - Encourage oral intake as appropriate  - Instruct patient on fluid and nutrition restrictions as appropriate  Outcome: Progressing

## 2024-03-09 NOTE — PLAN OF CARE
Rec'd pt from ED at ~1700. Oriented to room, admission navigator completed. A&O x 4. Tele shows NSR. O2 sats adequate on RA. Pt continent, up ad osvaldo. C/O severe pain in jaw/throat/ear, MD aware. PRN norco and hot packs given. Skin dry and intact, right sided permacath present. Skin assessed w/ Annie PCT. Bed locked and in low position, call light and personal items within reach. Will continue to monitor. POC - HD tonight, Cardiology to see, pain control.    Problem: PAIN - ADULT  Goal: Verbalizes/displays adequate comfort level or patient's stated pain goal  Description: INTERVENTIONS:  - Encourage pt to monitor pain and request assistance  - Assess pain using appropriate pain scale  - Administer analgesics based on type and severity of pain and evaluate response  - Implement non-pharmacological measures as appropriate and evaluate response  - Consider cultural and social influences on pain and pain management  - Manage/alleviate anxiety  - Utilize distraction and/or relaxation techniques  - Monitor for opioid side effects  - Notify MD/LIP if interventions unsuccessful or patient reports new pain  - Anticipate increased pain with activity and pre-medicate as appropriate  Outcome: Progressing

## 2024-03-09 NOTE — CONSULTS
[facility] Cardiology  Report of Consultation    Godwin Fonseca Patient Status:  Inpatient    1980 MRN [id number]   [facility] 2NE-A Attending Laila Ryder MD   Hosp Day # 1 PCP Prieto Novak MD     Reason for Consultation:   Elevated troponin.    History of Present Illness:   Godwin Fonseca is a(n) 43 year old male with MV repair , ESRD on HD, HTN, DM2, COPD, depression, chronic abdominal pain who is known to me.  He is admitted for SOB after missing HD.  He also had a /120 which prompted him to come to the ER.  Has a new headache.    He denies any chest pain or pressure.  He does get some palpitations.  He feels a \"fluttering in his chest\" which he thinks is his mitral valve.  At baseline when getting regular HD he does not have SOB or angina with moderate activity.    He is currently CP free and breathing normally.  Just woke up.  Comfortable.    Past Medical History:   Past Medical History:   Diagnosis Date    Asthma (Formerly [facility])     Attention deficit hyperactivity disorder (ADHD)     Back problem     Bipolar 1 disorder (HCC)     Cancer (HCC)     CKD (chronic kidney disease) stage 3, GFR 30-59 ml/min (HCC)     Dr Meeks    Congestive heart disease (HCC)     COPD (chronic obstructive pulmonary disease) (Formerly [facility])     Coronary atherosclerosis     Deep vein thrombosis (Formerly [facility])     at age 17 R/T cast    Depression     Diabetes (Formerly [facility])     Essential hypertension 2014    3/21 echo: severe concentric LVH with normal EF and no MR or pHTN    Extrinsic asthma, unspecified     Heart attack (HCC)     2016- angiogram- no intervention    Heart valve disease     mitral valve repair in /    High blood pressure     High cholesterol     History of mitral valve repair 2022    Hyperlipidemia     Low back pain     tight and stiff after sweeping and mopping    LVH (left ventricular hypertrophy)     Migraines     Mixed hyperlipidemia 2021     HDL 38 LDL 97 VLDL 57     Monoclonal  gammopathy     IgG kappa     MVP (mitral valve prolapse)     Repair 1994 at [place]; echoes as recently as 3/21 show mild or trivial MR and no stenosis    Neuropathy     Osteoarthritis     hip ,knees    Pneumonia due to organism     Pulmonary embolism (HCC) 01/2023    Renal disorder     TIA (transient ischemic attack) 03/2021    Initial history of left-sided weakness and slurred speech. (+) cocaine. MRI of the brain, CT angiogram of the head and neck, and 2D echo are all unremarkable.     TMJ (dislocation of temporomandibular joint)     Troponin level elevated 03/2021    Trop 60 60 47 with TIA and no CP: Lexiscan negative with EF 51       Social History:   Smoking:  None  Alcohol:  None    Family History:   No family history of premature arthrosclerotic heart disease     Medications:   Scheduled:    heparin  5,000 Units Subcutaneous Q8H MARTHA    ARIPiprazole  15 mg Oral Daily    buPROPion ER  150 mg Oral Daily    carvedilol  25 mg Oral BID with meals    cloNIDine  0.1 mg Oral BID    FLUoxetine HCl  40 mg Oral Daily    furosemide  80 mg Oral BID (Diuretic)    hydrALAZINE  50 mg Oral BID    isosorbide mononitrate ER  30 mg Oral Daily    lamoTRIgine  150 mg Oral Daily    amphetamine-dextroamphetamine  10 mg Oral BID AC    losartan  100 mg Oral Daily    NIFEdipine ER  60 mg Oral BID    sevelamer carbonate  800 mg Oral TID CC    tamsulosin  0.4 mg Oral Daily @ 0700    umeclidinium bromide  1 puff Inhalation Daily    insulin aspart  1-5 Units Subcutaneous TID AC and HS       Continuous Infusion:       PRN Medications:   acetaminophen **OR** [DISCONTINUED] HYDROcodone-acetaminophen **OR** [DISCONTINUED] HYDROcodone-acetaminophen, polyethylene glycol (PEG 3350), sennosides, bisacodyl, ondansetron, prochlorperazine, HYDROmorphone **OR** HYDROmorphone **OR** HYDROmorphone, HYDROcodone-acetaminophen, glucose **OR** glucose **OR** glucose-vitamin C **OR** dextrose **OR** glucose **OR** glucose **OR** glucose-vitamin  C    Outpatient Medications:   Current Facility-Administered Medications on File Prior to Encounter   Medication Dose Route Frequency Provider Last Rate Last Admin    [COMPLETED] labetalol (Trandate) 5 mg/mL injection 10 mg  10 mg Intravenous Once Jhonny Rebolledo MD   10 mg at 24 0357    [COMPLETED] butalbital-acetaminophen-caffeine (Fioricet) -40 MG per tab 1 tablet  1 tablet Oral Once Jhonny Rebolledo MD   1 tablet at 24 0245    [COMPLETED] acetaminophen (Tylenol Extra Strength) 500 MG tab        Given at 24 0300    [] sodium chloride 0.9 % IV bolus 100 mL  100 mL Intravenous Q30 Min PRN Toni, Ali, DO        And    [] albumin human (Albumin) 25% injection 25 g  25 g Intravenous PRN Dialysis Toni, Ali, DO        [COMPLETED] heparin (Porcine) 1000 UNIT/ML injection 2,000 Units  2,000 Units Intravenous Once Toni, Ali, DO   2,000 Units at 24 1820    [COMPLETED] hydrALAzine (Apresoline) 20 mg/mL injection 10 mg  10 mg Intravenous Once Jhonny Rebolledo MD   10 mg at 24 2225    [COMPLETED] sodium chloride 0.9 % IV bolus 500 mL  500 mL Intravenous Once Davis Bob MD   Stopped at 24 2137    [COMPLETED] acetaminophen (Tylenol Extra Strength) tab 1,000 mg  1,000 mg Oral Once Davis Bob MD   1,000 mg at 24 2300    [COMPLETED] morphINE PF 4 MG/ML injection 2 mg  2 mg Intravenous Once Davis Bob MD   2 mg at 24 2300    [COMPLETED] polyethylene glycol-electrolyte (Golytely) 236 g oral solution 4,000 mL  4,000 mL Oral Once Ousmane Suarez MD   4,000 mL at 23 1220    [COMPLETED] morphINE PF 4 MG/ML injection 4 mg  4 mg Intravenous Once Davis Bob MD   4 mg at 23 0841    [COMPLETED] cloNIDine (Catapres) tab 0.1 mg  0.1 mg Oral Once Davis Bob MD   0.1 mg at 23 1157    [COMPLETED] labetalol (Trandate) 5 mg/mL injection 20 mg  20 mg Intravenous Once Davis Bob MD   20 mg at 23 1039    [COMPLETED]  polyethylene glycol-electrolyte (Golytely) 236 g oral solution 4,000 mL  4,000 mL Oral Once Ousmane Suarez MD   2,000 mL at 12/29/23 0662     Current Outpatient Medications on File Prior to Encounter   Medication Sig Dispense Refill    isosorbide mononitrate ER 30 MG Oral Tablet 24 Hr Take 1 tablet (30 mg total) by mouth daily.      tamsulosin 0.4 MG Oral Cap Take 1 capsule (0.4 mg total) by mouth daily.      Lisdexamfetamine Dimesylate 60 MG Oral Cap Take 1 capsule (60 mg total) by mouth every morning.      buPROPion  MG Oral Tablet 24 Hr Take 1 tablet (150 mg total) by mouth daily.      ARIPiprazole 15 MG Oral Tab Take 1 tablet (15 mg total) by mouth daily.      bisacodyl 5 MG Oral Tab EC Take 3 tablets (15 mg total) by mouth daily. 30 tablet 0    polyethylene glycol, PEG 3350, 17 g Oral Powd Pack Take 17 g by mouth in the morning and 17 g before bedtime. 60 packet 0    lamoTRIgine (LAMICTAL) 150 MG Oral Tab Take 1 tablet (150 mg total) by mouth daily. 7 tablet 0    FLUoxetine HCl 40 MG Oral Cap Take 1 capsule (40 mg total) by mouth daily. 7 capsule 0    potassium chloride 20 MEQ Oral Tab CR Take 1 tablet (20 mEq total) by mouth daily.      RENVELA 800 MG Oral Tab Take 1 tablet (800 mg total) by mouth 3 (three) times daily with meals.      losartan 100 MG Oral Tab Take 1 tablet (100 mg total) by mouth daily.      furosemide 80 MG Oral Tab Take 1 tablet (80 mg total) by mouth 2 (two) times daily.      hydrALAZINE 50 MG Oral Tab Take 1 tablet (50 mg total) by mouth in the morning and 1 tablet (50 mg total) before bedtime. 30 tablet 0    NIFEdipine ER 60 MG Oral Tablet 24 Hr Take 1 tablet (60 mg total) by mouth in the morning and 1 tablet (60 mg total) before bedtime.      cloNIDine 0.1 MG Oral Tab Take 1 tablet (0.1 mg total) by mouth 2 (two) times daily. 60 tablet 0    ergocalciferol 1.25 MG (19649 UT) Oral Cap Take 1 capsule (50,000 Units total) by mouth Every Monday.      carvedilol 25 MG Oral Tab Take 1  tablet (25 mg total) by mouth in the morning and 1 tablet (25 mg total) in the evening. Take with meals. 60 tablet 6    Tiotropium Bromide Monohydrate (SPIRIVA HANDIHALER) 18 MCG Inhalation Cap 1 capsule (18 mcg total) daily.      Fenofibrate 134 MG Oral Cap Take 1 capsule by mouth nightly. 90 capsule 1    TRULICITY 0.75 MG/0.5ML Subcutaneous Solution Pen-injector once a week. EVERY MONDAY      benzonatate 100 MG Oral Cap Take 1 capsule (100 mg total) by mouth 3 (three) times daily as needed for cough. 30 capsule 0    Hyoscyamine Sulfate 0.125 MG Oral Tab Take 1 tablet (0.125 mg total) by mouth every 6 (six) hours as needed for Cramping.      albuterol 108 (90 Base) MCG/ACT Inhalation Aero Soln albuterol sulfate HFA 90 mcg/actuation aerosol inhaler   INHALE 1 TO 2 PUFFS BY MOUTH EVERY 4-6 HOURS AS NEEDED FOR COUGH OR WHEEZING      acetaminophen 500 MG Oral Tab Take 1 tablet (500 mg total) by mouth every 6 (six) hours as needed for Pain.      Fluticasone Propionate 50 MCG/ACT Nasal Suspension SPRAY ONCE INTO EACH NOSTRIL BID PRN 15.8 mL 0       Allergies:   Allergies   Allergen Reactions    Hydrochlorothiazide RASH and HIVES       Review of Systems:   No fevers, chills, change in weight or bowel habits.  Ten point review of systems is otherwise negative or unremarkable.    Physical Exam:   Vitals:    03/09/24 0759   BP: (!) 155/98   Pulse: 94   Resp: 21   Temp: 98.2 °F (36.8 °C)     Wt Readings from Last 3 Encounters:   03/09/24 243 lb 6.2 oz (110.4 kg)   01/05/24 250 lb (113.4 kg)   01/03/24 250 lb (113.4 kg)           General: Well developed, well nourished male.  Pt is in no acute distress.  HEENT:   Normocephalic.  Atraumatic.  Eyes with no scleral icterus.  Neck: Supple.  No JVD.  Carotids 2+ and equal in symmetric fashion.  No bruits are noted.  Cardiac: Regular rate and rhythm.   There is a normal S1 and S2.  No S3 or S4.  No murmurs, rubs, or gallops.  PMI is non-displaced with a normal apical  impulse.  Lungs: Clear to ascultation bilaterally.  No focal rales, rhonchi, or wheezes.  Good air movement is noted throughout all lung fields.  Abdomen: Soft.  Non-distended.  Non-tender.  Bowel sounds are present and normoactive.  No guarding or rebound.   Extremities: Extremities do not demonstrate any evidence of peripheral edema.   No cyanosis or clubbing of the digits is appreciated.  Femoral, Dorsalis Pedis, and Posterior Tibialis  pulses are 2+ and equal in a symmetric fashion.  Neurologic: Alert and oriented, normal affect.  No gross deficit appreciated.  Integument:  No visible rashes are appreciated.      Laboratories and Data:   Labs:    Recent Labs   Lab 03/08/24  1302 03/09/24  0616   * 125*   BUN 80* 40*   CREATSERUM 7.79* 4.97*   CA 7.4* 8.8   ALB 3.4 3.6    136   K 4.1 3.6    104   CO2 21.0 25.0   ALKPHO 92  --    AST 47*  --    ALT 30  --    BILT 0.6  --    TP 7.2  --        Recent Labs   Lab 03/08/24  1302 03/09/24  0616   RBC 3.18* 3.30*   HGB 9.8* 9.9*   HCT 29.2* 29.8*   MCV 91.8 90.3   MCH 30.8 30.0   MCHC 33.6 33.2   RDW 13.9 13.8   NEPRELIM 7.38 8.17*   WBC 9.6 10.6   .0 212.0       No results for input(s): \"PTP\", \"INR\" in the last 168 hours.    No results for input(s): \"TROP\", \"CK\" in the last 168 hours.    Diagnostics:   Tele: Sinus.  EKG: Sinus.  LVH by voltage.  Echo: 11/2023  Conclusions:   1. Left ventricle: The cavity size was normal. Wall thickness was moderately      increased. Systolic function was normal. The estimated ejection fraction      was 55-60%. No diagnostic evidence for regional wall motion      abnormalities. Left ventricular diastolic function parameters were      normal.   2. Right ventricle: Systolic function was normal.   3. Left atrium: The atrium was moderately dilated. The left atrial volume      was moderately increased.   4. Mitral valve: An annular ring was present. Transvalvular velocity was      minimally increased. There was mild  to moderate regurgitation. The mean      diastolic gradient was 4mm Hg.   5. Pericardium, extracardiac: There was no pericardial effusion.   Cath: 4/2023  Coronary angiography:  right dominant system  - LM: no significant angiographic disease  - LAD: mild luminal irregularities  - LCx: mild luminal irregularities  - RCA: mild luminal irregularities      Assessment:  1. Hx MV repair  2. ESRD, HD dependent, missed HD  3. DM  4. HTN  5. Dyslipidemia  6. LVH      Plan:  1. HD per renal.  2. Echo, if EF normal with no WMA would ignore troponin.  3. Ok to DC home from CV perspective if echo stable.    Benja Reyes MD  3/9/2024  10:56 AM

## 2024-03-10 ENCOUNTER — APPOINTMENT (OUTPATIENT)
Dept: CV DIAGNOSTICS | Facility: HOSPITAL | Age: 44
End: 2024-03-10
Attending: INTERNAL MEDICINE
Payer: MEDICARE

## 2024-03-10 VITALS
OXYGEN SATURATION: 98 % | HEART RATE: 97 BPM | WEIGHT: 246.94 LBS | TEMPERATURE: 98 F | BODY MASS INDEX: 31.69 KG/M2 | RESPIRATION RATE: 20 BRPM | HEIGHT: 74 IN | DIASTOLIC BLOOD PRESSURE: 76 MMHG | SYSTOLIC BLOOD PRESSURE: 110 MMHG

## 2024-03-10 LAB
ALBUMIN SERPL-MCNC: 3.4 G/DL (ref 3.4–5)
ANION GAP SERPL CALC-SCNC: 5 MMOL/L (ref 0–18)
BUN BLD-MCNC: 63 MG/DL (ref 9–23)
CALCIUM BLD-MCNC: 8 MG/DL (ref 8.5–10.1)
CHLORIDE SERPL-SCNC: 107 MMOL/L (ref 98–112)
CO2 SERPL-SCNC: 24 MMOL/L (ref 21–32)
CREAT BLD-MCNC: 6.79 MG/DL
EGFRCR SERPLBLD CKD-EPI 2021: 9 ML/MIN/1.73M2 (ref 60–?)
GLUCOSE BLD-MCNC: 102 MG/DL (ref 70–99)
GLUCOSE BLD-MCNC: 110 MG/DL (ref 70–99)
GLUCOSE BLD-MCNC: 117 MG/DL (ref 70–99)
GLUCOSE BLD-MCNC: 124 MG/DL (ref 70–99)
GLUCOSE BLD-MCNC: 360 MG/DL (ref 70–99)
MAGNESIUM SERPL-MCNC: 2.6 MG/DL (ref 1.6–2.6)
OSMOLALITY SERPL CALC.SUM OF ELEC: 301 MOSM/KG (ref 275–295)
PHOSPHATE SERPL-MCNC: 5.3 MG/DL (ref 2.5–4.9)
POTASSIUM SERPL-SCNC: 3.8 MMOL/L (ref 3.5–5.1)
SODIUM SERPL-SCNC: 136 MMOL/L (ref 136–145)

## 2024-03-10 PROCEDURE — 83735 ASSAY OF MAGNESIUM: CPT | Performed by: INTERNAL MEDICINE

## 2024-03-10 PROCEDURE — 90935 HEMODIALYSIS ONE EVALUATION: CPT | Performed by: INTERNAL MEDICINE

## 2024-03-10 PROCEDURE — 93308 TTE F-UP OR LMTD: CPT | Performed by: INTERNAL MEDICINE

## 2024-03-10 PROCEDURE — 82962 GLUCOSE BLOOD TEST: CPT

## 2024-03-10 PROCEDURE — 80069 RENAL FUNCTION PANEL: CPT | Performed by: INTERNAL MEDICINE

## 2024-03-10 NOTE — PROGRESS NOTES
[facility] Cardiology  Report of Consultation    Godwin Fonseca Patient Status:  Inpatient    1980 MRN [id number]   [facility] 2NE-A Attending Laila Ryder MD   Hosp Day # 2 PCP Prieto Novak MD     Reason for Consultation:   Elevated troponin.    History of Present Illness:   Godwin Fonseca is a(n) 43 year old male with MV repair , ESRD on HD, HTN, DM2, COPD, depression, chronic abdominal pain who is known to me.  He is admitted for SOB after missing HD.  He also had a /120 which prompted him to come to the ER.  Has a new headache.    He denies any chest pain or pressure.  He does get some palpitations.  He feels a \"fluttering in his chest\" which he thinks is his mitral valve.  At baseline when getting regular HD he does not have SOB or angina with moderate activity.    He is currently CP free and breathing normally.  Just woke up.  Comfortable.    Subjective:  No events overnight.  Getting HD.  No CP or SOB.  Overall comfortable.    Past Medical History:   Past Medical History:   Diagnosis Date    Asthma ([facility])     Attention deficit hyperactivity disorder (ADHD)     Back problem     Bipolar 1 disorder (HCC)     Cancer ([facility])     CKD (chronic kidney disease) stage 3, GFR 30-59 ml/min ([facility])     Dr Meeks    Congestive heart disease ([facility])     COPD (chronic obstructive pulmonary disease) ([facility])     Coronary atherosclerosis     Deep vein thrombosis ([facility])     at age 17 R/T cast    Depression     Diabetes ([facility])     Essential hypertension 2014    3/21 echo: severe concentric LVH with normal EF and no MR or pHTN    Extrinsic asthma, unspecified     Heart attack (HCC)     - angiogram- no intervention    Heart valve disease     mitral valve repair in /    High blood pressure     High cholesterol     History of mitral valve repair 2022    Hyperlipidemia     Low back pain     tight and stiff after sweeping and mopping    LVH (left ventricular hypertrophy)     Migraines      Mixed hyperlipidemia 03/2021     HDL 38 LDL 97 VLDL 57     Monoclonal gammopathy     IgG kappa     MVP (mitral valve prolapse)     Repair 1994 at [place]; echoes as recently as 3/21 show mild or trivial MR and no stenosis    Neuropathy     Osteoarthritis     hip ,knees    Pneumonia due to organism     Pulmonary embolism (HCC) 01/2023    Renal disorder     TIA (transient ischemic attack) 03/2021    Initial history of left-sided weakness and slurred speech. (+) cocaine. MRI of the brain, CT angiogram of the head and neck, and 2D echo are all unremarkable.     TMJ (dislocation of temporomandibular joint)     Troponin level elevated 03/2021    Trop 60 60 47 with TIA and no CP: Lexiscan negative with EF 51       Social History:   Smoking:  None  Alcohol:  None    Family History:   No family history of premature arthrosclerotic heart disease     Medications:   Scheduled:    heparin  5,000 Units Subcutaneous Q8H MARTHA    ARIPiprazole  15 mg Oral Daily    buPROPion ER  150 mg Oral Daily    carvedilol  25 mg Oral BID with meals    cloNIDine  0.1 mg Oral BID    FLUoxetine HCl  40 mg Oral Daily    furosemide  80 mg Oral BID (Diuretic)    hydrALAZINE  50 mg Oral BID    isosorbide mononitrate ER  30 mg Oral Daily    lamoTRIgine  150 mg Oral Daily    amphetamine-dextroamphetamine  10 mg Oral BID AC    losartan  100 mg Oral Daily    NIFEdipine ER  60 mg Oral BID    sevelamer carbonate  800 mg Oral TID CC    tamsulosin  0.4 mg Oral Daily @ 0700    umeclidinium bromide  1 puff Inhalation Daily    insulin aspart  1-5 Units Subcutaneous TID AC and HS       Continuous Infusion:       PRN Medications:   heparin, benzocaine-menthol, acetaminophen **OR** [DISCONTINUED] HYDROcodone-acetaminophen **OR** [DISCONTINUED] HYDROcodone-acetaminophen, polyethylene glycol (PEG 3350), sennosides, bisacodyl, ondansetron, prochlorperazine, HYDROmorphone **OR** HYDROmorphone **OR** HYDROmorphone, HYDROcodone-acetaminophen, glucose **OR**  glucose **OR** glucose-vitamin C **OR** dextrose **OR** glucose **OR** glucose **OR** glucose-vitamin C    Outpatient Medications:   Current Facility-Administered Medications on File Prior to Encounter   Medication Dose Route Frequency Provider Last Rate Last Admin    [COMPLETED] labetalol (Trandate) 5 mg/mL injection 10 mg  10 mg Intravenous Once Jhonny Rebolledo MD   10 mg at 24 0357    [COMPLETED] butalbital-acetaminophen-caffeine (Fioricet) -40 MG per tab 1 tablet  1 tablet Oral Once Jhonny Rebolledo MD   1 tablet at 24 0245    [COMPLETED] acetaminophen (Tylenol Extra Strength) 500 MG tab        Given at 24 0300    [] sodium chloride 0.9 % IV bolus 100 mL  100 mL Intravenous Q30 Min PRN Toni, Ali, DO        And    [] albumin human (Albumin) 25% injection 25 g  25 g Intravenous PRN Dialysis Toni, Ali, DO        [COMPLETED] heparin (Porcine) 1000 UNIT/ML injection 2,000 Units  2,000 Units Intravenous Once Toni, Ali, DO   2,000 Units at 24 1820    [COMPLETED] hydrALAzine (Apresoline) 20 mg/mL injection 10 mg  10 mg Intravenous Once Jhonny Rebolledo MD   10 mg at 24 2225    [COMPLETED] sodium chloride 0.9 % IV bolus 500 mL  500 mL Intravenous Once Davis Bob MD   Stopped at 24 2137    [COMPLETED] acetaminophen (Tylenol Extra Strength) tab 1,000 mg  1,000 mg Oral Once Davis Bob MD   1,000 mg at 24 2300    [COMPLETED] morphINE PF 4 MG/ML injection 2 mg  2 mg Intravenous Once Davis Bob MD   2 mg at 24 2300    [COMPLETED] polyethylene glycol-electrolyte (Golytely) 236 g oral solution 4,000 mL  4,000 mL Oral Once Ousmane Suarez MD   4,000 mL at 23 1220    [COMPLETED] morphINE PF 4 MG/ML injection 4 mg  4 mg Intravenous Once Davis Bob MD   4 mg at 23 0841    [COMPLETED] cloNIDine (Catapres) tab 0.1 mg  0.1 mg Oral Once Davis Bob MD   0.1 mg at 23 1157    [COMPLETED] labetalol (Trandate) 5 mg/mL  injection 20 mg  20 mg Intravenous Once Davis Bob MD   20 mg at 12/28/23 1039    [COMPLETED] polyethylene glycol-electrolyte (Golytely) 236 g oral solution 4,000 mL  4,000 mL Oral Once Ousmane Suarez MD   2,000 mL at 12/29/23 0573     Current Outpatient Medications on File Prior to Encounter   Medication Sig Dispense Refill    isosorbide mononitrate ER 30 MG Oral Tablet 24 Hr Take 1 tablet (30 mg total) by mouth daily.      tamsulosin 0.4 MG Oral Cap Take 1 capsule (0.4 mg total) by mouth daily.      Lisdexamfetamine Dimesylate 60 MG Oral Cap Take 1 capsule (60 mg total) by mouth every morning.      buPROPion  MG Oral Tablet 24 Hr Take 1 tablet (150 mg total) by mouth daily.      ARIPiprazole 15 MG Oral Tab Take 1 tablet (15 mg total) by mouth daily.      bisacodyl 5 MG Oral Tab EC Take 3 tablets (15 mg total) by mouth daily. 30 tablet 0    polyethylene glycol, PEG 3350, 17 g Oral Powd Pack Take 17 g by mouth in the morning and 17 g before bedtime. 60 packet 0    lamoTRIgine (LAMICTAL) 150 MG Oral Tab Take 1 tablet (150 mg total) by mouth daily. 7 tablet 0    FLUoxetine HCl 40 MG Oral Cap Take 1 capsule (40 mg total) by mouth daily. 7 capsule 0    potassium chloride 20 MEQ Oral Tab CR Take 1 tablet (20 mEq total) by mouth daily.      RENVELA 800 MG Oral Tab Take 1 tablet (800 mg total) by mouth 3 (three) times daily with meals.      losartan 100 MG Oral Tab Take 1 tablet (100 mg total) by mouth daily.      furosemide 80 MG Oral Tab Take 1 tablet (80 mg total) by mouth 2 (two) times daily.      hydrALAZINE 50 MG Oral Tab Take 1 tablet (50 mg total) by mouth in the morning and 1 tablet (50 mg total) before bedtime. 30 tablet 0    NIFEdipine ER 60 MG Oral Tablet 24 Hr Take 1 tablet (60 mg total) by mouth in the morning and 1 tablet (60 mg total) before bedtime.      cloNIDine 0.1 MG Oral Tab Take 1 tablet (0.1 mg total) by mouth 2 (two) times daily. 60 tablet 0    ergocalciferol 1.25 MG (85885 UT) Oral  Cap Take 1 capsule (50,000 Units total) by mouth Every Monday.      carvedilol 25 MG Oral Tab Take 1 tablet (25 mg total) by mouth in the morning and 1 tablet (25 mg total) in the evening. Take with meals. 60 tablet 6    Tiotropium Bromide Monohydrate (SPIRIVA HANDIHALER) 18 MCG Inhalation Cap 1 capsule (18 mcg total) daily.      Fenofibrate 134 MG Oral Cap Take 1 capsule by mouth nightly. 90 capsule 1    TRULICITY 0.75 MG/0.5ML Subcutaneous Solution Pen-injector once a week. EVERY MONDAY      benzonatate 100 MG Oral Cap Take 1 capsule (100 mg total) by mouth 3 (three) times daily as needed for cough. 30 capsule 0    Hyoscyamine Sulfate 0.125 MG Oral Tab Take 1 tablet (0.125 mg total) by mouth every 6 (six) hours as needed for Cramping.      albuterol 108 (90 Base) MCG/ACT Inhalation Aero Soln albuterol sulfate HFA 90 mcg/actuation aerosol inhaler   INHALE 1 TO 2 PUFFS BY MOUTH EVERY 4-6 HOURS AS NEEDED FOR COUGH OR WHEEZING      acetaminophen 500 MG Oral Tab Take 1 tablet (500 mg total) by mouth every 6 (six) hours as needed for Pain.      Fluticasone Propionate 50 MCG/ACT Nasal Suspension SPRAY ONCE INTO EACH NOSTRIL BID PRN 15.8 mL 0       Allergies:   Allergies   Allergen Reactions    Hydrochlorothiazide RASH and HIVES       Review of Systems:   No fevers, chills, change in weight or bowel habits.  Ten point review of systems is otherwise negative or unremarkable.    Physical Exam:   Vitals:    03/10/24 0851   BP: 101/67   Pulse: 93   Resp: 20   Temp: 98 °F (36.7 °C)     Wt Readings from Last 3 Encounters:   03/10/24 246 lb 14.6 oz (112 kg)   01/05/24 250 lb (113.4 kg)   01/03/24 250 lb (113.4 kg)           General: Well developed, well nourished male.  Pt is in no acute distress.  HEENT:   Normocephalic.  Atraumatic.  Eyes with no scleral icterus.  Neck: Supple.  No JVD.  Carotids 2+ and equal in symmetric fashion.  No bruits are noted.  Cardiac: Regular rate and rhythm.   There is a normal S1 and S2.  No S3 or  S4.  No murmurs, rubs, or gallops.  PMI is non-displaced with a normal apical impulse.  Lungs: Clear to ascultation bilaterally.  No focal rales, rhonchi, or wheezes.  Good air movement is noted throughout all lung fields.  Abdomen: Soft.  Non-distended.  Non-tender.  Bowel sounds are present and normoactive.  No guarding or rebound.   Extremities: Extremities do not demonstrate any evidence of peripheral edema.   No cyanosis or clubbing of the digits is appreciated.  Femoral, Dorsalis Pedis, and Posterior Tibialis  pulses are 2+ and equal in a symmetric fashion.  Neurologic: Alert and oriented, normal affect.  No gross deficit appreciated.  Integument:  No visible rashes are appreciated.      Laboratories and Data:   Labs:    Recent Labs   Lab 03/08/24  1302 03/09/24  0616 03/10/24  0722   * 125* 110*   BUN 80* 40* 63*   CREATSERUM 7.79* 4.97* 6.79*   CA 7.4* 8.8 8.0*   ALB 3.4 3.6 3.4    136 136   K 4.1 3.6 3.8    104 107   CO2 21.0 25.0 24.0   ALKPHO 92  --   --    AST 47*  --   --    ALT 30  --   --    BILT 0.6  --   --    TP 7.2  --   --        Recent Labs   Lab 03/08/24  1302 03/09/24  0616   RBC 3.18* 3.30*   HGB 9.8* 9.9*   HCT 29.2* 29.8*   MCV 91.8 90.3   MCH 30.8 30.0   MCHC 33.6 33.2   RDW 13.9 13.8   NEPRELIM 7.38 8.17*   WBC 9.6 10.6   .0 212.0       No results for input(s): \"PTP\", \"INR\" in the last 168 hours.    No results for input(s): \"TROP\", \"CK\" in the last 168 hours.    Diagnostics:   Tele: Sinus.  EKG: Sinus.  LVH by voltage.  Echo: 11/2023  Conclusions:   1. Left ventricle: The cavity size was normal. Wall thickness was moderately      increased. Systolic function was normal. The estimated ejection fraction      was 55-60%. No diagnostic evidence for regional wall motion      abnormalities. Left ventricular diastolic function parameters were      normal.   2. Right ventricle: Systolic function was normal.   3. Left atrium: The atrium was moderately dilated. The left  atrial volume      was moderately increased.   4. Mitral valve: An annular ring was present. Transvalvular velocity was      minimally increased. There was mild to moderate regurgitation. The mean      diastolic gradient was 4mm Hg.   5. Pericardium, extracardiac: There was no pericardial effusion.   Cath: 4/2023  Coronary angiography:  right dominant system  - LM: no significant angiographic disease  - LAD: mild luminal irregularities  - LCx: mild luminal irregularities  - RCA: mild luminal irregularities      Assessment:  1. Hx MV repair  2. ESRD, HD dependent, missed HD  3. DM  4. HTN  5. Dyslipidemia  6. LVH      Plan:  1. HD per renal.  2. Echo, if EF normal with no WMA would ignore troponin.  Will read ASAP.  3. Ok to DC home from CV perspective if echo stable.    Benja Reyes MD

## 2024-03-10 NOTE — PLAN OF CARE
Shift Note:  Assumed care of patient. Patient alert and oriented x4.   Patient on room air, denies difficulty breathing, lung sounds clear.   Runny nose, moist cough, PRN cough drops.   Denies any cardiac symptoms, NSR on tele. Echo completed this morning.   Headache this morning, pain medications as ordered.   Continent of bowel and bladder. R permacath clean, dry and intact.   Ambulates independently, call light within reach, tolerating care well.     POC:  - HD today, soft BP, held all morning BP medications   - DC planning

## 2024-03-10 NOTE — PROGRESS NOTES
Discharge Note:     Patient tele discontinued, IV discontinued with catheter intact.  Patient discharge instructions reviewed with patient, verbalize understanding.   Patient escorted via wheelchair to the [facility] by this RN.

## 2024-03-10 NOTE — PROGRESS NOTES
[facility]   [facility] of [facility]    Nephrology Progress Note    Godwin Fonseca Attending:  Laila Ryder MD     Cc: esrd    SUBJECTIVE     Seen and examined on HD, tolerating well. No complaints. BPs soft (BP meds held this am by RN, appreciated)    PHYSICAL EXAM   Vital signs: /67 (BP Location: Right arm)   Pulse 93   Temp 98 °F (36.7 °C) (Oral)   Resp 20   Ht 6' 2\" (1.88 m)   Wt 246 lb 14.6 oz (112 kg)   SpO2 96%   BMI 31.70 kg/m²   Temp (24hrs), Av.8 °F (36.6 °C), Min:97.4 °F (36.3 °C), Max:98 °F (36.7 °C)       Intake/Output Summary (Last 24 hours) at 3/10/2024 1145  Last data filed at 3/10/2024 1000  Gross per 24 hour   Intake 780 ml   Output --   Net 780 ml     Wt Readings from Last 3 Encounters:   03/10/24 246 lb 14.6 oz (112 kg)   24 250 lb (113.4 kg)   24 250 lb (113.4 kg)     General: NAD  HEENT: NCAT, EOMI, MMM  Neck: Supple   Cardiac: Regular rate and rhythm   Lungs: CTAB  Abdomen: Soft, non-tender, nondistended   Extremities: No edema  Neurologic: No asterxis  Skin: Warm and dry, no rashes     MEDS     Current Facility-Administered Medications   Medication Dose Route Frequency    heparin (Porcine) 1000 UNIT/ML injection 1,500 Units  1.5 mL Intracatheter PRN Dialysis    benzocaine-menthol (Cepacol) lozenge 1 lozenge  1 lozenge Oral PRN    heparin (Porcine) 5000 UNIT/ML injection 5,000 Units  5,000 Units Subcutaneous Q8H MARTHA    acetaminophen (Tylenol) tab 650 mg  650 mg Oral Q4H PRN    polyethylene glycol (PEG 3350) (Miralax) 17 g oral packet 17 g  17 g Oral Daily PRN    sennosides (Senokot) tab 17.2 mg  17.2 mg Oral Nightly PRN    bisacodyl (Dulcolax) 10 MG rectal suppository 10 mg  10 mg Rectal Daily PRN    ondansetron (Zofran) 4 MG/2ML injection 4 mg  4 mg Intravenous Q6H PRN    prochlorperazine (Compazine) 10 MG/2ML injection 5 mg  5 mg Intravenous Q8H PRN    HYDROmorphone (Dilaudid) 1 MG/ML injection 0.2 mg  0.2 mg Intravenous Q2H PRN    Or    HYDROmorphone  (Dilaudid) 1 MG/ML injection 0.4 mg  0.4 mg Intravenous Q2H PRN    Or    HYDROmorphone (Dilaudid) 1 MG/ML injection 0.8 mg  0.8 mg Intravenous Q2H PRN    HYDROcodone-acetaminophen (Norco)  MG per tab 1 tablet  1 tablet Oral Q4H PRN    ARIPiprazole (Abilify) tab 15 mg  15 mg Oral Daily    buPROPion ER (Wellbutrin XL) 24 hr tab 150 mg  150 mg Oral Daily    carvedilol (Coreg) tab 25 mg  25 mg Oral BID with meals    cloNIDine (Catapres) tab 0.1 mg  0.1 mg Oral BID    FLUoxetine (PROzac) cap 40 mg  40 mg Oral Daily    furosemide (Lasix) tab 80 mg  80 mg Oral BID (Diuretic)    hydrALAZINE (Apresoline) tab 50 mg  50 mg Oral BID    isosorbide mononitrate ER (Imdur) 24 hr tab 30 mg  30 mg Oral Daily    lamoTRIgine (LaMICtal) tab 150 mg  150 mg Oral Daily    amphetamine-dextroamphetamine (Adderall) tab 10 mg  10 mg Oral BID AC    losartan (Cozaar) tab 100 mg  100 mg Oral Daily    NIFEdipine ER (Procardia-XL) 24 hr tab 60 mg  60 mg Oral BID    sevelamer carbonate (Renvela) tab 800 mg  800 mg Oral TID CC    tamsulosin (Flomax) cap 0.4 mg  0.4 mg Oral Daily @ 0700    umeclidinium bromide (Incruse Ellipta) 62.5 MCG/ACT inhaler 1 puff  1 puff Inhalation Daily    glucose (Dex4) 15 GM/59ML oral liquid 15 g  15 g Oral Q15 Min PRN    Or    glucose (Glutose) 40% oral gel 15 g  15 g Oral Q15 Min PRN    Or    glucose-vitamin C (Dex-4) chewable tab 4 tablet  4 tablet Oral Q15 Min PRN    Or    dextrose 50% injection 50 mL  50 mL Intravenous Q15 Min PRN    Or    glucose (Dex4) 15 GM/59ML oral liquid 30 g  30 g Oral Q15 Min PRN    Or    glucose (Glutose) 40% oral gel 30 g  30 g Oral Q15 Min PRN    Or    glucose-vitamin C (Dex-4) chewable tab 8 tablet  8 tablet Oral Q15 Min PRN    insulin aspart (NovoLOG) 100 Units/mL FlexPen 1-5 Units  1-5 Units Subcutaneous TID AC and HS       LABS     Lab Results   Component Value Date    CREATSERUM 6.79 03/10/2024    BUN 63 03/10/2024     03/10/2024    K 3.8 03/10/2024     03/10/2024     CO2 24.0 03/10/2024     03/10/2024    CA 8.0 03/10/2024    ALB 3.4 03/10/2024    MG 2.6 03/10/2024    PHOS 5.3 03/10/2024    PGLU 124 03/10/2024       IMAGING   All imaging studies personally reviewed.    XR CHEST AP PORTABLE  (CPT=71045)   Final Result   PROCEDURE:  XR CHEST AP PORTABLE  (CPT=71045)       TECHNIQUE:  AP chest radiograph was obtained.       COMPARISON:  [facility], XR, XR CHEST PA + LAT CHEST (CPT=71046),    1/03/2024, 9:01 PM.  [facility], XR, XR CHEST AP PORTABLE  (CPT=71045),    11/23/2023, 3:30 PM.       INDICATIONS:  difficulty breathing. missed 2 dialysis days       PATIENT STATED HISTORY: (As transcribed by Technologist)  Sudden onset    severe short of breath and slight tightness across chest. Symptoms started    yesterday.            FINDINGS:  Small right pleural effusion and associated atelectasis,    similar to prior.  The left lung remains clear.  No left pleural effusion.     No pneumothorax.  There is a right chest tunneled dialysis catheter in    place.  Cardiac silhouette is    unchanged.  No acute osseous findings.                         =====   CONCLUSION:  Small right pleural effusion with associated atelectasis.           LOCATION:  [facility]                       Dictated by (CST): Duy De La Rosa MD on 3/08/2024 at 1:19 PM        Finalized by (CST): Duy De La Rosa MD on 3/08/2024 at 1:20 PM               ASSESSMENT & PLAN    43 year old male w ESRD on HD TThS, bipolar, HTN, dCHF, COPD, ho DVT, sp MV repair who presents with SOB and neck pain.      ESRD  -- schedule TThS  -- access: perm cath   -- center [facility]  -- missed HD x 2 PTA. Per nephrology notes states poor compliance with dialysis    -- sp HD Friday overnight w 3K, UF 3L. Getting additional HD today due to pt stating feeling overloaded/sob   -- avoid morphine and pavan. Renally dose meds      Anemia  -- transfusion prn. Epo for hgb < 10 if BPs acceptable      HTN  -- UF w HD. Continue home BP meds. Cards on  consult. Getting ECHO   -- per outpatient nephrology notes -- notes BP better controlled in hospital and concerned re compliance w meds, would agree with this, today pts BP in 100s and had to hold all his BP meds, so really unclear what compliance is. Would recommend pt monitoring home BPs closely and holding BP meds if < 120. Per notes was on = coreg 25 BID, clonidine 0.1 at bedtime, lasix 80 daily, hydralazine 50 BID, imdur 30 daily, losartan 100 daily, nifedipine ER 60 BID     Hyperphosphatemia  -- sevelamer      D/w Dr Reyes and RN and dialysis RN    Thank you for allowing me to participate in the care of this patient. Please do not hesitate to call with questions or concerns.       Samaria Nava MD  [facility] Nephrology

## 2024-03-10 NOTE — PLAN OF CARE
Problem: PAIN - ADULT  Complains of  headache, reports pain is over eyes, denies dizziness of blurred vision  Medicated with Dilaudid for 6-7/10 headache  Goal: Verbalizes/displays adequate comfort level or patient's stated pain goal  Patient reports Norco does not relieve pain.   Description: INTERVENTIONS:  - Encourage pt to monitor pain and request assistance  - Assess pain using appropriate pain scale  - Administer analgesics based on type and severity of pain and evaluate response  - Implement non-pharmacological measures as appropriate and evaluate response  - Consider cultural and social influences on pain and pain management  - Manage/alleviate anxiety  - Utilize distraction and/or relaxation techniques  - Monitor for opioid side effects  - Notify MD/LIP if interventions unsuccessful or patient reports new pain  - Anticipate increased pain with activity and pre-medicate as appropriate  Outcome: Progressing     Problem: RISK FOR INFECTION - ADULT  Afebrile  Goal: Absence of fever/infection during anticipated neutropenic period  Description: INTERVENTIONS  - Monitor WBC  - Administer growth factors as ordered  - Implement neutropenic guidelines  Outcome: Progressing     Problem: SAFETY ADULT - FALL  Fall precautions in progress  Goal: Free from fall injury  Description: INTERVENTIONS:  - Assess pt frequently for physical needs  - Identify cognitive and physical deficits and behaviors that affect risk of falls.  - [place] fall precautions as indicated by assessment.  - Educate pt/family on patient safety including physical limitations  - Instruct pt to call for assistance with activity based on assessment  - Modify environment to reduce risk of injury  - Provide assistive devices as appropriate  - Consider OT/PT consult to assist with strengthening/mobility  - Encourage toileting schedule  Outcome: Progressing     Problem: DISCHARGE PLANNING  Discharge planning in progress,   Possible on 3/10 after  HD  Goal: Discharge to home or other facility with appropriate resources  Description: INTERVENTIONS:  - Identify barriers to discharge w/pt and caregiver  - Include patient/family/discharge partner in discharge planning  - Arrange for needed discharge resources and transportation as appropriate  - Identify discharge learning needs (meds, wound care, etc)  - Arrange for interpreters to assist at discharge as needed  - Consider post-discharge preferences of patient/family/discharge partner  - Complete POLST form as appropriate  - Assess patient's ability to be responsible for managing their own health  - Refer to Case Management Department for coordinating discharge planning if the patient needs post-hospital services based on physician/LIP order or complex needs related to functional status, cognitive ability or social support system  Outcome: Progressing     Problem: CARDIOVASCULAR - ADULT  Vital signs are stable  Denies chest pain or shortness of breath    Telemetry monitoring in progress  NSR   Goal: Maintains optimal cardiac output and hemodynamic stability  Description: INTERVENTIONS:  - Monitor vital signs, rhythm, and trends  - Monitor for bleeding, hypotension and signs of decreased cardiac output  - Evaluate effectiveness of vasoactive medications to optimize hemodynamic stability  - Monitor arterial and/or venous puncture sites for bleeding and/or hematoma  - Assess quality of pulses, skin color and temperature  - Assess for signs of decreased coronary artery perfusion - ex. Angina  - Evaluate fluid balance, assess for edema, trend weights  Outcome: Progressing  Goal: Absence of cardiac arrhythmias or at baseline  Description: INTERVENTIONS:  - Continuous cardiac monitoring, monitor vital signs, obtain 12 lead EKG if indicated  - Evaluate effectiveness of antiarrhythmic and heart rate control medications as ordered  - Initiate emergency measures for life threatening arrhythmias  - Monitor electrolytes and  administer replacement therapy as ordered  Outcome: Progressing     Problem: RESPIRATORY - ADULT  Complains of sore throat, difficulty talking due to sore throat  Cepacol lozenge given   Encouraged to gargle  Goal: Achieves optimal ventilation and oxygenation  Continuous pulse oximetry in progress  Oxygen saturation 95% on room air  Description: INTERVENTIONS:  - Assess for changes in respiratory status  - Assess for changes in mentation and behavior  - Position to facilitate oxygenation and minimize respiratory effort  - Oxygen supplementation based on oxygen saturation or ABGs  - Provide Smoking Cessation handout, if applicable  - Encourage broncho-pulmonary hygiene including cough, deep breathe, Incentive Spirometry  - Assess the need for suctioning and perform as needed  - Assess and instruct to report SOB or any respiratory difficulty  - Respiratory Therapy support as indicated  - Manage/alleviate anxiety  - Monitor for signs/symptoms of CO2 retention  Outcome: Progressing     Problem: METABOLIC/FLUID AND ELECTROLYTES - ADULT  K improving with HD, Electrolyte replacement per Nephrology  Goal: Glucose maintained within prescribed range  Type II diabetes , POC  testing QID  Description: INTERVENTIONS:  - Monitor Blood Glucose as ordered  - Assess for signs and symptoms of hyperglycemia and hypoglycemia  - Administer ordered medications to maintain glucose within target range  - Assess barriers to adequate nutritional intake and initiate nutrition consult as needed  - Instruct patient on self management of diabetes  Outcome: Progressing  Goal: Electrolytes maintained within normal limits  Description: INTERVENTIONS:  - Monitor labs and rhythm and assess patient for signs and symptoms of electrolyte imbalances  - Administer electrolyte replacement as ordered  - Monitor response to electrolyte replacements, including rhythm and repeat lab results as appropriate  - Fluid restriction as ordered  - Instruct patient on  fluid and nutrition restrictions as appropriate  Outcome: Progressing  Goal: Hemodynamic stability and optimal renal function maintained  Description: INTERVENTIONS:  - Monitor labs and assess for signs and symptoms of volume excess or deficit  - Monitor intake, output and patient weight  - Monitor urine specific gravity, serum osmolarity and serum sodium as indicated or ordered  - Monitor response to interventions for patient's volume status, including labs, urine output, blood pressure (other measures as available)  - Encourage oral intake as appropriate  - Instruct patient on fluid and nutrition restrictions as appropriate  Outcome: Progressing

## 2024-03-11 NOTE — PAYOR COMM NOTE
--------------  DISCHARGE REVIEW    Payor: [employer]  Subscriber #:  [id number]  Authorization Number: [id number]    Admit date: 3/8/24  Admit time:   4:52 PM  Discharge Date: 3/10/2024  6:20 PM         .[facility] Internal Medicine Discharge Summary    Patient ID:  Godwin Fonseca  [id number]  43 year old  4/12/1980    Admit date: 3/8/2024  Discharge date and time: 3/10/2024  Attending Physician: Laila Ryder MD  Primary Care Physician: Prieto Novak MD     Admit Dx: Neck pain [M54.2]  Elevated troponin [R79.89]  Hypertensive urgency [I16.0]    Primary Diagnosis at discharge from Hospital: Other: dyspnea; no TCM follow-up needed     Secondary Diagnoses:  Esrd on hd  Chronic pain/headache  htn    Risk of readmission: Godwin Fonseca has Moderate Risk of readmission after discharge from the hospital.    Discharged Condition: good    Disposition: home    Important follow up:  Benja Reyes MD  [address] [place] [address]  [facility] [id number]  [phone]    Follow up          Reason for admission and hospital course:   Pt was admitted for sob in setting of missing dialysis for esrd. Also with h/o hfpef. He received additional hd. Echo okay. Bp meds restarted with improvement in bp and breathing. Headache persisted and is chronic per pt- pt to f/u with pain clinic, c-spine injections being done with some relief.     Day of discharge Exam  Vitals:    03/10/24 1717   BP: 110/76   Pulse: 97   Resp: 20   Temp: 98.2 °F (36.8 °C)     Exam on day of discharge:  Gen: No acute distress, alert and oriented  CV: RRR, +s1/s2  Lungs: CTAB, good respiratory effort  Abdomen: s/nt/nd  Ext: Moves all 4 extremities, no c/c/e  Neuro: CN Intact, no focal deficits    Discharge meds     Medication List        CONTINUE taking these medications      acetaminophen 500 MG Tabs  Commonly known as: Tylenol Extra Strength     albuterol 108 (90 Base) MCG/ACT Aers  Commonly known as: Ventolin HFA     ARIPiprazole 15 MG Tabs  Commonly known as:  Abilify     benzonatate 100 MG Caps  Commonly known as: Tessalon  Take 1 capsule (100 mg total) by mouth 3 (three) times daily as needed for cough.     bisacodyl 5 MG Tbec  Commonly known as: Dulcolax  Take 3 tablets (15 mg total) by mouth daily.     buPROPion  MG Tb24  Commonly known as: Wellbutrin XL     carvedilol 25 MG Tabs  Commonly known as: Coreg  Take 1 tablet (25 mg total) by mouth in the morning and 1 tablet (25 mg total) in the evening. Take with meals.     cloNIDine 0.1 MG Tabs  Commonly known as: Catapres  Take 1 tablet (0.1 mg total) by mouth 2 (two) times daily.     ergocalciferol 1.25 MG (28291 UT) Caps  Commonly known as: ERGOCALCIFEROL     Fenofibrate 134 MG Caps  Take 1 capsule by mouth nightly.     FLUoxetine HCl 40 MG Caps  Commonly known as: PROZAC  Take 1 capsule (40 mg total) by mouth daily.     fluticasone propionate 50 MCG/ACT Susp  Commonly known as: Flonase  SPRAY ONCE INTO EACH NOSTRIL BID PRN     furosemide 80 MG Tabs  Commonly known as: Lasix     hydrALAZINE 50 MG Tabs  Commonly known as: Apresoline     Hyoscyamine Sulfate 0.125 MG Tabs  Commonly known as: LEVSIN     isosorbide mononitrate ER 30 MG Tb24  Commonly known as: Imdur     lamoTRIgine 150 MG Tabs  Commonly known as: LaMICtal  Take 1 tablet (150 mg total) by mouth daily.     Lisdexamfetamine Dimesylate 60 MG Caps  Commonly known as: VYVANSE     losartan 100 MG Tabs  Commonly known as: Cozaar     NIFEdipine ER 60 MG Tb24  Commonly known as: ADALAT CC     Polyethylene Glycol 3350 17 g Pack  Commonly known as: MIRALAX  Take 17 g by mouth in the morning and 17 g before bedtime.     potassium chloride 20 MEQ Tbcr  Commonly known as: K-Dur     Renvela 800 MG Tabs  Generic drug: sevelamer carbonate     Spiriva HandiHaler 18 MCG Caps  Generic drug: tiotropium     tamsulosin 0.4 MG Caps  Commonly known as: Flomax     Trulicity 0.75 MG/0.5ML Sopn  Generic drug: Dulaglutide            Consults: IP CONSULT TO CARDIOLOGY  IP CONSULT  TO NEPHROLOGY  Radiology: CARD ECHO LIMITED (CPT=93308)    Result Date: 3/10/2024  Transthoracic Echocardiogram Name:Godwin Fonseca Date: 03/10/2024 :  1980 Ht:  (74in)  BP: 109 / 57 MRN:  [id number]    Age:  43years    Wt:  (246lb) HR: 85bpm Loc:  [facility]       Gndr: M          BSA: 2.37m^2 Sonographer: Evelyne GIBBS Ordering:    Benja Reyes MD Consulting:  Samaria Nava ---------------------------------------------------------------------------- History/Indications:   Troponinemia.  Mitral Valve Repair.  Shortness od breath.  Risk factors:  Hypertension.  Coronary artery bypass grafting. ---------------------------------------------------------------------------- Procedure information:  A transthoracic echocardiogram, limited study was performed. Additional evaluation included M-mode and limited 2D.  Patient status:  Inpatient.  Location:  Bedside.    Comparison was made to the study of 2023.    This was a routine study. Transthoracic echocardiography for ventricular function evaluation and assessment of valvular function. Image quality was adequate. ECG rhythm:   Normal sinus ---------------------------------------------------------------------------- Conclusions: 1. Left ventricle: The cavity size was normal. There is moderarte concentric    left ventricular hypertrophy Systolic function was normal. The estimated    ejection fraction was 55-60%, by visual assessment. Wall motion is    normal; there are no regional wall motion abnormalities. 2. Mitral valve: There was mild to moderate regurgitation. There is history    of annular ring repair The mean diastolic gradient was 2mm Hg. Impressions:  This study is compared with previous dated 23: No significant change since prior study. * ---------------------------------------------------------------------------- * Findings: Left ventricle:  The cavity size was normal. There is moderarte concentric left ventricular hypertrophy Systolic function was  normal. The estimated ejection fraction was 55-60%, by visual assessment. Wall motion is normal; there are no regional wall motion abnormalities. Mitral valve:  An annular ring was present. Leaflet separation was normal. Doppler:  Transvalvular velocity was within the normal range. There was no evidence for stenosis. There was mild to moderate regurgitation.    The mean diastolic gradient was 2mm Hg. Pericardium:   There was no pericardial effusion. Pleura:  No evidence of pleural fluid accumulation. ---------------------------------------------------------------------------- Measurements  Left ventricle          Value       Ref       11/24/2023  IVS thickness, ED,  (H) 1.8   cm    0.6 - 1.0 1.3  PLAX  LV ID, ED, PLAX         4.3   cm    4.2 - 5.8 5.1  LV ID, ES, PLAX         2.9   cm    2.5 - 4.0 3.5  LV PW thickness,    (H) 1.5   cm    0.6 - 1.0 1.3  ED, PLAX  IVS/LV PW ratio,        1.20        --------- 1.01  ED, PLAX  LV PW/LV ID ratio,      0.36        --------- 0.25  ED, PLAX  LV ejection             61    %     52 - 72   60  fraction  Mitral valve            Value       Ref       11/24/2023  Mitral mean             2     mm Hg --------- 4  gradient, D  Mitral peak             4     mm Hg --------- 7  gradient, D  Mitral regurg VTI,      24.8  cm    --------- 31.2  PISA Legend: (L)  and  (H)  fahad values outside specified reference range. ---------------------------------------------------------------------------- Prepared and electronically signed by Yesenia Rubin MD 03/10/2024 12:39     XR CHEST AP PORTABLE  (CPT=71045)    Result Date: 3/8/2024  PROCEDURE:  XR CHEST AP PORTABLE  (CPT=71045)  TECHNIQUE:  AP chest radiograph was obtained.  COMPARISON:  [facility], XR, XR CHEST PA + LAT CHEST (TJU=[id number]), 1/03/2024, 9:01 PM.  [facility], XR, XR CHEST AP PORTABLE  (CPT=71045), 11/23/2023, 3:30 PM.  INDICATIONS:  difficulty breathing. missed 2 dialysis days  PATIENT STATED HISTORY: (As transcribed by  Technologist)  Sudden onset severe short of breath and slight tightness across chest. Symptoms started yesterday.    FINDINGS:  Small right pleural effusion and associated atelectasis, similar to prior.  The left lung remains clear.  No left pleural effusion.  No pneumothorax.  There is a right chest tunneled dialysis catheter in place.  Cardiac silhouette is unchanged.  No acute osseous findings.            CONCLUSION:  Small right pleural effusion with associated atelectasis.   LOCATION:  [facility]      Dictated by (CST): Duy De La Rosa MD on 3/08/2024 at 1:19 PM     Finalized by (CST): Duy De La Rosa MD on 3/08/2024 at 1:20 PM       Operative Procedures:   Activity: activity as tolerated  Diet: regular diet  Wound Care: none needed  Code Status: Full Code  O2: none  Total Time Coordinating Care: 35 minutes Patient had opportunity to ask questions and state understand and agree with therapeutic plan as outlined    I reconciled current and discharge medications on day of discharge.        Electronically signed by Laila Ryder MD on 3/11/2024 10:32 AM         REVIEWER COMMENTS

## 2024-03-11 NOTE — PAYOR COMM NOTE
--------------  ADMISSION REVIEW     Payor: [employer]  Subscriber #:  [id number]  Authorization Number: [id number]    Admit date: 3/8/24  Admit time:  4:52 PM       Patient Seen in: [facility] [place]    History     Stated Complaint: difficulty breathing. missed 2 dialysis days    43-year-old male complaining shortness of breath patient describes she has had some neck pain somewhat chronic he had some injections a few weeks ago and that seem to worsen the last few days is also had some sinus congestion bilateral earache slight cough and the last couple days feeling short of breath.  He denies any chest pain no vomiting or diarrhea is a dialysis patient he missed his last 2 dialysis his last dialysis was 6 days ago.  He has a history of asthma bipolar disorder COPD    Objective:   Past Medical History:   Diagnosis Date    Asthma ([facility])     Attention deficit hyperactivity disorder (ADHD)     Back problem     Bipolar 1 disorder ([facility])     Cancer ([facility])     CKD (chronic kidney disease) stage 3, GFR 30-59 ml/min ([facility])     Dr Meeks    Congestive heart disease ([facility])     COPD (chronic obstructive pulmonary disease) ([facility])     Coronary atherosclerosis     Deep vein thrombosis ([facility])     at age 17 R/T cast    Depression     Diabetes ([facility])     Essential hypertension 2014    3/21 echo: severe concentric LVH with normal EF and no MR or pHTN    Extrinsic asthma, unspecified     Heart attack ([facility])     2016- angiogram- no intervention    Heart valve disease     mitral valve repair in 1994/    High blood pressure     High cholesterol     History of mitral valve repair 12/2022    Hyperlipidemia     Low back pain     tight and stiff after sweeping and mopping    LVH (left ventricular hypertrophy)     Migraines     Mixed hyperlipidemia 03/2021     HDL 38 LDL 97 VLDL 57     Monoclonal gammopathy     IgG kappa     MVP (mitral valve prolapse)     Repair 1994 at [place]; echoes as recently as  3/21 show mild or trivial MR and no stenosis    Neuropathy     Osteoarthritis     hip ,knees    Pneumonia due to organism     Pulmonary embolism (HCC) 01/2023    Renal disorder     TIA (transient ischemic attack) 03/2021    Initial history of left-sided weakness and slurred speech. (+) cocaine. MRI of the brain, CT angiogram of the head and neck, and 2D echo are all unremarkable.     TMJ (dislocation of temporomandibular joint)     Troponin level elevated 03/2021    Trop 60 60 47 with TIA and no CP: Lexiscan negative with EF 51     Past Surgical History:   Procedure Laterality Date    COLONOSCOPY N/A 03/26/2023    Procedure: COLONOSCOPY;  Surgeon: Heath Vu MD;  Location:  ENDOSCOPY    COLONOSCOPY N/A 12/30/2023    Procedure: COLONOSCOPY with cold snare polypectomy and forcep polypectomy;  Surgeon: Ousmane Suarez MD;  Location:  ENDOSCOPY    COLONOSCOPY & POLYPECTOMY  2019    EGD  2019    Duodenitis. Biopsied. EUS for weight loss was negative    HEART SURGERY      HERNIA SURGERY  08/17/2022    Dr Barnes    LAMINECTOMY,>2 SGMT,LUMBAR  09/06/2018    L4-L5 Decomp Discectomy ROEM L4-L5    MITRALPLASTY W CP BYPASS  1994    [facility]: Repair    REPAIR ROTATOR CUFF,CHRONIC Left     torn and had a ruptured bicep    VALVE REPAIR  1994    mitral valve       Physical Exam     ED Triage Vitals   BP 03/08/24 1237 (!) 188/123   Pulse 03/08/24 1237 98   Resp 03/08/24 1237 22   Temp 03/08/24 1237 97.5 °F (36.4 °C)   Temp src 03/08/24 1726 Oral   SpO2 03/08/24 1237 99 %   O2 Device 03/08/24 1237 None (Room air)     Current:BP (!) 161/112 (BP Location: Left arm)   Pulse 98   Temp 97.8 °F (36.6 °C) (Oral)   Resp 25   Ht 188 cm (6' 2\")   Wt 106.8 kg   SpO2 95%   BMI 30.23 kg/m²     Physical Exam    Patient is alert orient x 3 appears uncomfortable HEENT exam pupils are equal round react light extract muscles are intact oropharynx clear tympanic memories are on the neck is supple no nuchal rigidity lymphadenopathy there  is some tenderness in the posterior aspect of the neck there is no nuchal rigidity lungs are clear cardiovascular exam shows regular rate and rhythm without murmurs abdomen is soft and nontender extremities there is trace edema    Labs Reviewed   COMP METABOLIC PANEL (14) - Abnormal; Notable for the following components:       Result Value    Glucose 189 (*)     BUN 80 (*)     Creatinine 7.79 (*)     Calcium, Total 7.4 (*)     Calculated Osmolality 319 (*)     eGFR-Cr 8 (*)     AST 47 (*)     A/G Ratio 0.9 (*)     All other components within normal limits   TROPONIN I HIGH SENSITIVITY - Abnormal; Notable for the following components:    Troponin I (High Sensitivity) 345 (*)     All other components within normal limits   PRO BETA NATRIURETIC PEPTIDE - Abnormal; Notable for the following components:    Pro-Beta Natriuretic Peptide 14,247 (*)     All other components within normal limits   CBC W/ DIFFERENTIAL - Abnormal; Notable for the following components:    RBC 3.18 (*)     HGB 9.8 (*)     HCT 29.2 (*)     All other components within normal limits   POCT GLUCOSE - Normal   POCT FLU TEST - Normal   RAPID SARS-COV-2 BY PCR - Normal     EKG 93  Sinus Rhythm  Reading: Left ventricular hypertrophy prolonged QT this is an abnormal EKG     XR CHEST AP PORTABLE   Small right pleural effusion with associated atelectasis.     Images independently reviewed no pneumonia.  Labs showed elevated troponin of approximately 345 the proBNP is 14,000 BUN 80 creatinine 7.79 hemoglobin was 9.8    MDM    Patient has significant elevated blood pressure emergency department was treated with 2 doses labetalol as well as hydralazine chest x-ray did not show any fluid overload however it is concerning that he has not had dialysis for several days Case was discussed with cardiology as well as nephrology and the hospitalist the patient had previously elevated troponins and had a relatively normal cardiac cath a year or so ago.  Patient was  admitted for further evaluation dialysis and evaluation by cardiology.    Disposition and Plan     Clinical Impression:  1. Elevated troponin    2. Hypertensive urgency    3. Neck pain       Hospital Problems       Present on Admission  Date Reviewed: 1/3/2024            ICD-10-CM Noted POA    * (Principal) Elevated troponin R79.89 6/10/2019 Unknown    Acute renal failure (ARF) (HCC) N17.9 3/8/2024 Yes    Anemia D64.9 3/8/2024 Yes    Hypertensive urgency I16.0 11/29/2020 Unknown    Neck pain M54.2 3/8/2024 Unknown           History and Physical      Godwin Fonseca is a 43 year old male with mmp including but not limited to end-stage renal disease on HD, HFpEF, diabetes mellitus type 2, COPD, chronic abdominal pain and depression/bipolar/ADHD with multiple ER/hospital admissions, is admitted for sob. Also noting neck,top of head pain which started yesterday- says had cervical nerve block at OSH recently and thinks worn off. No n/v/f/c.      BP (!) 143/100   Pulse 90   Temp 97.5 °F (36.4 °C)   Resp 20   Ht 6' 2\" (1.88 m)   Wt 250 lb (113.4 kg)   SpO2 97%   BMI 32.10 kg/m²      General:  Alert, uncomfortable with pain- asking for pain med, appears stated age, no accessory m use,   Head:  Normocephalic, without obvious abnormality, atraumatic.   Eyes:  Sclera anicteric,  EOMs intact. Lids wnl.    Ears, nose, throat:  external ears and nose within normal limits, hearing intact         Neck: Supple, symmetrical   Lungs:   Diminished, ok effort   Chest wall:  No tenderness or deformity.   Heart:  Regular rate and rhythm, S1, S2 normal,no LE edema   Abdomen:   Soft, non-tender. Bowel sounds normal. . Non distended, no peritoneal signs   Extremities: Extremities normal, atraumatic, no edema.   Skin: Skin color, texture, turgor normal. No visible rashes or lesions.    Neurologic:  Psychiatric: No facial droop   appropriate affect,  answering questions ok     Assessment & Plan:   43 year old male with mmp including but  not limited to end-stage renal disease on HD, HFpEF, diabetes mellitus type 2, COPD, chronic abdominal pain and depression/bipolar/ADHD with multiple ER/hospital admissions, is admitted for sob.      **sob  **acute on chronic HFpEF in setting of   **ESRD on HD, missed a few sessions of HD  -cardiology and renal on consult, appreciate  -HD per renal  -covid neg, bnp 14k. Trop elevated but with esrd     **hypertensive urgency- continue home meds. On array of antihypertensives already. Suspect neck pain exacerbating     **chronic neck pain, top of head sp cervical nerve block  -follows with pain clinic outpatient  -IV dilaudid prn, norco prn, antiemetics, bowel regimen        DM type 2 - ISS/accucheks  COPD -home med  Hyperlipidemia -home med  Depression/bipolar/ADHD - home meds      PPx-heparin subcutaneous    3/9:    CARDS:    Reason for Consultation:   Elevated troponin.     History of Present Illness:   Godwin Fonseca is a(n) 43 year old male with MV repair 1994, ESRD on HD, HTN, DM2, COPD, depression, chronic abdominal pain who is known to me.  He is admitted for SOB after missing HD.  He also had a /120 which prompted him to come to the ER.  Has a new headache.     He denies any chest pain or pressure.  He does get some palpitations.  He feels a \"fluttering in his chest\" which he thinks is his mitral valve.  At baseline when getting regular HD he does not have SOB or angina with moderate activity.     He is currently CP free and breathing normally.  Just woke up.  Comfortable.    Medications:   Scheduled:    heparin  5,000 Units Subcutaneous Q8H [facility]    ARIPiprazole  15 mg Oral Daily    buPROPion ER  150 mg Oral Daily    carvedilol  25 mg Oral BID with meals    cloNIDine  0.1 mg Oral BID    FLUoxetine HCl  40 mg Oral Daily    furosemide  80 mg Oral BID (Diuretic)    hydrALAZINE  50 mg Oral BID    isosorbide mononitrate ER  30 mg Oral Daily    lamoTRIgine  150 mg Oral Daily    amphetamine-dextroamphetamine  10  mg Oral BID AC    losartan  100 mg Oral Daily    NIFEdipine ER  60 mg Oral BID    sevelamer carbonate  800 mg Oral TID CC    tamsulosin  0.4 mg Oral Daily @ 0700    umeclidinium bromide  1 puff Inhalation Daily    insulin aspart  1-5 Units Subcutaneous TID AC and HS             03/09/24 0759   BP: (!) 155/98   Pulse: 94   Resp: 21   Temp: 98.2 °F (36.8 °C)          Wt Readings from Last 3 Encounters:   03/09/24 243 lb 6.2 oz (110.4 kg)   01/05/24 250 lb (113.4 kg)   01/03/24 250 lb (113.4 kg)              General: Well developed, well nourished male.  Pt is in no acute distress.  HEENT:   Normocephalic.  Atraumatic.  Eyes with no scleral icterus.  Neck: Supple.  No JVD.  Carotids 2+ and equal in symmetric fashion.  No bruits are noted.  Cardiac: Regular rate and rhythm.   There is a normal S1 and S2.  No S3 or S4.  No murmurs, rubs, or gallops.  PMI is non-displaced with a normal apical impulse.  Lungs: Clear to ascultation bilaterally.  No focal rales, rhonchi, or wheezes.  Good air movement is noted throughout all lung fields.  Abdomen: Soft.  Non-distended.  Non-tender.  Bowel sounds are present and normoactive.  No guarding or rebound.   Extremities: Extremities do not demonstrate any evidence of peripheral edema.   No cyanosis or clubbing of the digits is appreciated.  Femoral, Dorsalis Pedis, and Posterior Tibialis  pulses are 2+ and equal in a symmetric fashion.  Neurologic: Alert and oriented, normal affect.  No gross deficit appreciated.  Integument:  No visible rashes are appreciated.        Lab 03/08/24  1302 03/09/24  0616   * 125*   BUN 80* 40*   CREATSERUM 7.79* 4.97*   CA 7.4* 8.8   ALB 3.4 3.6    136   K 4.1 3.6    104   CO2 21.0 25.0     RBC 3.18* 3.30*   HGB 9.8* 9.9*   HCT 29.2* 29.8*   MCV 91.8 90.3   MCH 30.8 30.0   MCHC 33.6 33.2   RDW 13.9 13.8   NEPRELIM 7.38 8.17*   WBC 9.6 10.6   .0 212.0        Assessment:  1. Hx MV repair  2. ESRD, HD dependent, missed HD  3.  DM  4. HTN  5. Dyslipidemia  6. LVH        Plan:  1. HD per renal.  2. Echo, if EF normal with no WMA would ignore troponin.      RENAL:    Godwin Fonseca is a a(n) 43 year old male w ESRD on HD TThS, bipolar, HTN, dCHF, COPD, ho DVT, sp MV repair who presents with SOB and neck pain. Notes last HD Saturday. Notes they usually take 4+ L off. Goes to [facility]. BPs elevated in ED, sp IV meds. Got HD overnight.       ASSESSMENT/PLAN:   Godwin Fonseca is a a(n) 43 year old male w ESRD on HD TThS, bipolar, HTN, dCHF, COPD, ho DVT, sp MV repair who presents with SOB and neck pain.      ESRD  -- schedule TThS  -- access: perm cath   -- center [facility]  -- missed HD x 2 PTA. Per nephrology notes states poor compliance with dialysis    -- sp HD overnight w 3K, UF 3L. May need additional HD Sun or Mon given missed sessions and BP if remains in house  -- avoid morphine and pavan. Renally dose meds      Anemia  -- transfusion prn. Epo for hgb < 10 if BPs acceptable      HTN  -- UF w HD. Continue home BP meds. Cards on consult. Getting ECHO   -- per outpatient nephrology notes -- notes BP better controlled in hospital and concerned re compliance w meds. Per notes was on = coreg 25 BID, clonidine 0.1 at bedtime, lasix 80 daily, hydralazine 50 BID, imdur 30 daily, losartan 100 daily, nifedipine ER 60 BID     Hyperphosphatemia  -- sevelamer       HOSPITALIST:    Bp improved  Headache still present. Has sore throat as well  Had 3L removed with HD overnight. Still feels a bit sob.        Assessment/Plan:      43 year old male with mmp including but not limited to end-stage renal disease on HD, HFpEF, diabetes mellitus type 2, COPD, chronic abdominal pain and depression/bipolar/ADHD with multiple ER/hospital admissions, is admitted for sob.      **sob  **acute on chronic HFpEF in setting of   **ESRD on HD, missed HD  **?viral syndrome  -cardiology and renal on consult, appreciate  -echo pending  -will send resp viral  panel  -plan for additional HD tomorrow.     **hypertensive urgency- significantly improving, may have been exacerbated by headache/neck pain.     **chronic neck pain, top of head sp cervical nerve block  -follows with pain clinic outpatient  -prn pain meds (has mostly received norco), antiemetics, bowel regimen  -r/o viral syndrome given other sx such as sore throat        DM type 2 - ISS/accucheks  COPD -home med  Hyperlipidemia -home med  Depression/bipolar/ADHD - home meds      PPx-heparin subcutaneous        NURSING:  Complains of  headache, reports pain is over eyes, denies dizziness of blurred vision  Medicated with Dilaudid for 6-7/10 headache      MEDICATIONS ADMINISTERED IN LAST 1 DAY:    HYDROmorphone (Dilaudid) 1 MG/ML injection 0.4 mg       Date Action Dose Route User    Discharged on 3/10/2024    3/10/2024 1507 Given 0.4 mg Intravenous Joslyn Johnson, RN          Vitals (last day) before discharge       Date/Time Temp Pulse Resp BP SpO2 Weight O2 Device O2 Flow Rate (L/min) [facility]    03/10/24 1717 98.2 °F (36.8 °C) 97 20 110/76 98 % -- None (Room air) -- OSCAR    03/10/24 1300 98 °F (36.7 °C) 88 18 128/75 97 % -- None (Room air) -- NB    03/10/24 1100 -- 92 -- 111/68 95 % -- -- -- NB    03/10/24 0851 98 °F (36.7 °C) 93 20 101/67 96 % -- None (Room air) -- OSCAR    03/10/24 0516 97.5 °F (36.4 °C) 92 20 109/57 96 % 246 lb 14.6 oz None (Room air) 0 L/min MM    03/09/24 2338 98 °F (36.7 °C) 85 18 99/75 93 % -- None (Room air) 0 L/min MM    03/09/24 1926 97.6 °F (36.4 °C) 92 20 95/61 90 % -- None (Room air) 0 L/min MM    03/09/24 1645 97.4 °F (36.3 °C) 98 19 111/62 89 % -- None (Room air) --     03/09/24 1220 98 °F (36.7 °C) 97 20 115/78 91 % -- None (Room air) --     03/09/24 0759 98.2 °F (36.8 °C) 94 21 155/98 94 % -- Nasal cannula --     03/09/24 0345 -- -- -- -- -- 243 lb 6.2 oz -- --     03/09/24 0339 96.8 °F (36 °C) -- 22 -- -- -- Nasal cannula --     03/09/24 0330 -- 104 -- 173/117 99 % -- -- --      03/09/24 0300 -- 101 -- 166/106 96 % -- -- --

## 2024-03-11 NOTE — DISCHARGE SUMMARY
.[facility] Discharge Summary    Patient ID:  Godwin Fonseca  [id number]  43 year old  4/12/1980    Admit date: 3/8/2024  Discharge date and time: 3/10/2024  Attending Physician: Laila Ryder MD  Primary Care Physician: Prieto Novak MD     Admit Dx: Neck pain [M54.2]  Elevated troponin [R79.89]  Hypertensive urgency [I16.0]    Primary Diagnosis at discharge from Hospital: Other: dyspnea; no TCM follow-up needed     Secondary Diagnoses:  Esrd on hd  Chronic pain/headache  htn    Risk of readmission: Godwin Fonseca has Moderate Risk of readmission after discharge from the hospital.    Discharged Condition: good    Disposition: home    Important follow up:  Benja Reyes MD  [address]  [facility] [address] [phone]    Follow up          Reason for admission and hospital course:   Pt was admitted for sob in setting of missing dialysis for esrd. Also with h/o hfpef. He received additional hd. Echo okay. Bp meds restarted with improvement in bp and breathing. Headache persisted and is chronic per pt- pt to f/u with pain clinic, c-spine injections being done with some relief.     Day of discharge Exam  Vitals:    03/10/24 1717   BP: 110/76   Pulse: 97   Resp: 20   Temp: 98.2 °F (36.8 °C)     Exam on day of discharge:  Gen: No acute distress, alert and oriented  CV: RRR, +s1/s2  Lungs: CTAB, good respiratory effort  Abdomen: s/nt/nd  Ext: Moves all 4 extremities, no c/c/e  Neuro: CN Intact, no focal deficits    Discharge meds     Medication List        CONTINUE taking these medications      acetaminophen 500 MG Tabs  Commonly known as: Tylenol Extra Strength     albuterol 108 (90 Base) MCG/ACT Aers  Commonly known as: Ventolin HFA     ARIPiprazole 15 MG Tabs  Commonly known as: Abilify     benzonatate 100 MG Caps  Commonly known as: Tessalon  Take 1 capsule (100 mg total) by mouth 3 (three) times daily as needed for cough.     bisacodyl 5 MG Tbec  Commonly known as: Dulcolax  Take 3 tablets (15  mg total) by mouth daily.     buPROPion  MG Tb24  Commonly known as: Wellbutrin XL     carvedilol 25 MG Tabs  Commonly known as: Coreg  Take 1 tablet (25 mg total) by mouth in the morning and 1 tablet (25 mg total) in the evening. Take with meals.     cloNIDine 0.1 MG Tabs  Commonly known as: Catapres  Take 1 tablet (0.1 mg total) by mouth 2 (two) times daily.     ergocalciferol 1.25 MG (89543 UT) Caps  Commonly known as: ERGOCALCIFEROL     Fenofibrate 134 MG Caps  Take 1 capsule by mouth nightly.     FLUoxetine HCl 40 MG Caps  Commonly known as: PROZAC  Take 1 capsule (40 mg total) by mouth daily.     fluticasone propionate 50 MCG/ACT Susp  Commonly known as: Flonase  SPRAY ONCE INTO EACH NOSTRIL BID PRN     furosemide 80 MG Tabs  Commonly known as: Lasix     hydrALAZINE 50 MG Tabs  Commonly known as: Apresoline     Hyoscyamine Sulfate 0.125 MG Tabs  Commonly known as: LEVSIN     isosorbide mononitrate ER 30 MG Tb24  Commonly known as: Imdur     lamoTRIgine 150 MG Tabs  Commonly known as: LaMICtal  Take 1 tablet (150 mg total) by mouth daily.     Lisdexamfetamine Dimesylate 60 MG Caps  Commonly known as: VYVANSE     losartan 100 MG Tabs  Commonly known as: Cozaar     NIFEdipine ER 60 MG Tb24  Commonly known as: ADALAT CC     Polyethylene Glycol 3350 17 g Pack  Commonly known as: MIRALAX  Take 17 g by mouth in the morning and 17 g before bedtime.     potassium chloride 20 MEQ Tbcr  Commonly known as: K-Dur     Renvela 800 MG Tabs  Generic drug: sevelamer carbonate     Spiriva HandiHaler 18 MCG Caps  Generic drug: tiotropium     tamsulosin 0.4 MG Caps  Commonly known as: Flomax     Trulicity 0.75 MG/0.5ML Sopn  Generic drug: Dulaglutide            Consults: IP CONSULT TO CARDIOLOGY  IP CONSULT TO NEPHROLOGY  Radiology: CARD ECHO LIMITED (CPT=93308)    Result Date: 3/10/2024  Transthoracic Echocardiogram Name:Godwin Fonseca Date: 03/10/2024 :  1980 Ht:  (74in)  BP: 109 / 57 MRN:  [id number]    Age:   43years    Wt:  (246lb) HR: 85bpm Loc:  [facility]       Gndr: M          BSA: 2.37m^2 Sonographer: Evelyne GIBBS Ordering:    Benja Reyes MD Consulting:  Samaria Nava ---------------------------------------------------------------------------- History/Indications:   Troponinemia.  Mitral Valve Repair.  Shortness od breath.  Risk factors:  Hypertension.  Coronary artery bypass grafting. ---------------------------------------------------------------------------- Procedure information:  A transthoracic echocardiogram, limited study was performed. Additional evaluation included M-mode and limited 2D.  Patient status:  Inpatient.  Location:  Bedside.    Comparison was made to the study of 11/24/2023.    This was a routine study. Transthoracic echocardiography for ventricular function evaluation and assessment of valvular function. Image quality was adequate. ECG rhythm:   Normal sinus ---------------------------------------------------------------------------- Conclusions: 1. Left ventricle: The cavity size was normal. There is moderarte concentric    left ventricular hypertrophy Systolic function was normal. The estimated    ejection fraction was 55-60%, by visual assessment. Wall motion is    normal; there are no regional wall motion abnormalities. 2. Mitral valve: There was mild to moderate regurgitation. There is history    of annular ring repair The mean diastolic gradient was 2mm Hg. Impressions:  This study is compared with previous dated 11/24/23: No significant change since prior study. * ---------------------------------------------------------------------------- * Findings: Left ventricle:  The cavity size was normal. There is moderarte concentric left ventricular hypertrophy Systolic function was normal. The estimated ejection fraction was 55-60%, by visual assessment. Wall motion is normal; there are no regional wall motion abnormalities. Mitral valve:  An annular ring was present. Leaflet separation was  normal. Doppler:  Transvalvular velocity was within the normal range. There was no evidence for stenosis. There was mild to moderate regurgitation.    The mean diastolic gradient was 2mm Hg. Pericardium:   There was no pericardial effusion. Pleura:  No evidence of pleural fluid accumulation. ---------------------------------------------------------------------------- Measurements  Left ventricle          Value       Ref       11/24/2023  IVS thickness, ED,  (H) 1.8   cm    0.6 - 1.0 1.3  PLAX  LV ID, ED, PLAX         4.3   cm    4.2 - 5.8 5.1  LV ID, ES, PLAX         2.9   cm    2.5 - 4.0 3.5  LV PW thickness,    (H) 1.5   cm    0.6 - 1.0 1.3  ED, PLAX  IVS/LV PW ratio,        1.20        --------- 1.01  ED, PLAX  LV PW/LV ID ratio,      0.36        --------- 0.25  ED, PLAX  LV ejection             61    %     52 - 72   60  fraction  Mitral valve            Value       Ref       11/24/2023  Mitral mean             2     mm Hg --------- 4  gradient, D  Mitral peak             4     mm Hg --------- 7  gradient, D  Mitral regurg VTI,      24.8  cm    --------- 31.2  PISA Legend: (L)  and  (H)  fahad values outside specified reference range. ---------------------------------------------------------------------------- Prepared and electronically signed by Yesenia Rubin MD 03/10/2024 12:39     XR CHEST AP PORTABLE  (CPT=71045)    Result Date: 3/8/2024  PROCEDURE:  XR CHEST AP PORTABLE  (CPT=71045)  TECHNIQUE:  AP chest radiograph was obtained.  COMPARISON:  [facility], XR, XR CHEST PA + LAT CHEST (WLJ=64390), 1/03/2024, 9:01 PM.  [facility], XR, XR CHEST AP PORTABLE  (CPT=71045), 11/23/2023, 3:30 PM.  INDICATIONS:  difficulty breathing. missed 2 dialysis days  PATIENT STATED HISTORY: (As transcribed by Technologist)  Sudden onset severe short of breath and slight tightness across chest. Symptoms started yesterday.    FINDINGS:  Small right pleural effusion and associated atelectasis, similar to prior.  The left lung  remains clear.  No left pleural effusion.  No pneumothorax.  There is a right chest tunneled dialysis catheter in place.  Cardiac silhouette is unchanged.  No acute osseous findings.            CONCLUSION:  Small right pleural effusion with associated atelectasis.   LOCATION:  [facility]      Dictated by (CST): Duy De La Rosa MD on 3/08/2024 at 1:19 PM     Finalized by (CST): Duy De La Rosa MD on 3/08/2024 at 1:20 PM       Operative Procedures:   Activity: activity as tolerated  Diet: regular diet  Wound Care: none needed  Code Status: Full Code  O2: none  Total Time Coordinating Care: 35 minutes Patient had opportunity to ask questions and state understand and agree with therapeutic plan as outlined    I reconciled current and discharge medications on day of discharge.

## 2024-03-15 PROCEDURE — 93010 ELECTROCARDIOGRAM REPORT: CPT

## 2024-03-15 PROCEDURE — 99291 CRITICAL CARE FIRST HOUR: CPT

## 2024-03-16 ENCOUNTER — HOSPITAL ENCOUNTER (INPATIENT)
Facility: HOSPITAL | Age: 44
LOS: 1 days | Discharge: HOME | End: 2024-03-17
Attending: EMERGENCY MEDICINE | Admitting: INTERNAL MEDICINE
Payer: MEDICARE

## 2024-03-16 ENCOUNTER — APPOINTMENT (OUTPATIENT)
Dept: GENERAL RADIOLOGY | Age: 44
End: 2024-03-16
Attending: EMERGENCY MEDICINE
Payer: MEDICARE

## 2024-03-16 ENCOUNTER — APPOINTMENT (OUTPATIENT)
Dept: CT IMAGING | Age: 44
End: 2024-03-16
Attending: EMERGENCY MEDICINE
Payer: MEDICARE

## 2024-03-16 DIAGNOSIS — Z99.2 ESRD NEEDING DIALYSIS (HCC): Primary | ICD-10-CM

## 2024-03-16 DIAGNOSIS — R51.9 ACUTE NONINTRACTABLE HEADACHE, UNSPECIFIED HEADACHE TYPE: ICD-10-CM

## 2024-03-16 DIAGNOSIS — I16.1 HYPERTENSIVE EMERGENCY: ICD-10-CM

## 2024-03-16 DIAGNOSIS — N18.6 ESRD NEEDING DIALYSIS (HCC): Primary | ICD-10-CM

## 2024-03-16 DIAGNOSIS — R79.89 ELEVATED TROPONIN: ICD-10-CM

## 2024-03-16 LAB
ALBUMIN SERPL-MCNC: 3.4 G/DL (ref 3.4–5)
ALBUMIN/GLOB SERPL: 0.9 {RATIO} (ref 1–2)
ALP LIVER SERPL-CCNC: 102 U/L
ALT SERPL-CCNC: 32 U/L
ANION GAP SERPL CALC-SCNC: 11 MMOL/L (ref 0–18)
AST SERPL-CCNC: 50 U/L (ref 15–37)
BASOPHILS # BLD AUTO: 0.09 X10(3) UL (ref 0–0.2)
BASOPHILS NFR BLD AUTO: 1.2 %
BILIRUB SERPL-MCNC: 0.5 MG/DL (ref 0.1–2)
BUN BLD-MCNC: 60 MG/DL (ref 9–23)
CALCIUM BLD-MCNC: 7.9 MG/DL (ref 8.5–10.1)
CHLORIDE SERPL-SCNC: 112 MMOL/L (ref 98–112)
CHOLEST SERPL-MCNC: 165 MG/DL (ref ?–200)
CO2 SERPL-SCNC: 20 MMOL/L (ref 21–32)
CREAT BLD-MCNC: 6.24 MG/DL
EGFRCR SERPLBLD CKD-EPI 2021: 11 ML/MIN/1.73M2 (ref 60–?)
EOSINOPHIL # BLD AUTO: 0.11 X10(3) UL (ref 0–0.7)
EOSINOPHIL NFR BLD AUTO: 1.4 %
ERYTHROCYTE [DISTWIDTH] IN BLOOD BY AUTOMATED COUNT: 14.4 %
GLOBULIN PLAS-MCNC: 3.9 G/DL (ref 2.8–4.4)
GLUCOSE BLD-MCNC: 96 MG/DL (ref 70–99)
GLUCOSE BLD-MCNC: 98 MG/DL (ref 70–99)
HCT VFR BLD AUTO: 27.6 %
HDLC SERPL-MCNC: 52 MG/DL (ref 40–59)
HGB BLD-MCNC: 9.6 G/DL
IMM GRANULOCYTES # BLD AUTO: 0.03 X10(3) UL (ref 0–1)
IMM GRANULOCYTES NFR BLD: 0.4 %
LDLC SERPL CALC-MCNC: 84 MG/DL (ref ?–100)
LYMPHOCYTES # BLD AUTO: 1.4 X10(3) UL (ref 1–4)
LYMPHOCYTES NFR BLD AUTO: 18.2 %
MCH RBC QN AUTO: 31 PG (ref 26–34)
MCHC RBC AUTO-ENTMCNC: 34.8 G/DL (ref 31–37)
MCV RBC AUTO: 89 FL
MONOCYTES # BLD AUTO: 0.98 X10(3) UL (ref 0.1–1)
MONOCYTES NFR BLD AUTO: 12.7 %
MRSA DNA SPEC QL NAA+PROBE: NEGATIVE
NEUTROPHILS # BLD AUTO: 5.08 X10 (3) UL (ref 1.5–7.7)
NEUTROPHILS # BLD AUTO: 5.08 X10(3) UL (ref 1.5–7.7)
NEUTROPHILS NFR BLD AUTO: 66.1 %
NONHDLC SERPL-MCNC: 113 MG/DL (ref ?–130)
NT-PROBNP SERPL-MCNC: ABNORMAL PG/ML (ref ?–125)
OSMOLALITY SERPL CALC.SUM OF ELEC: 313 MOSM/KG (ref 275–295)
PLATELET # BLD AUTO: 250 10(3)UL (ref 150–450)
POCT INFLUENZA A: NEGATIVE
POCT INFLUENZA B: NEGATIVE
POTASSIUM SERPL-SCNC: 4.3 MMOL/L (ref 3.5–5.1)
PROT SERPL-MCNC: 7.3 G/DL (ref 6.4–8.2)
RBC # BLD AUTO: 3.1 X10(6)UL
SARS-COV-2 RNA RESP QL NAA+PROBE: NOT DETECTED
SODIUM SERPL-SCNC: 143 MMOL/L (ref 136–145)
TRIGL SERPL-MCNC: 169 MG/DL (ref 30–149)
TROPONIN I SERPL HS-MCNC: 400 NG/L
VLDLC SERPL CALC-MCNC: 27 MG/DL (ref 0–30)
WBC # BLD AUTO: 7.7 X10(3) UL (ref 4–11)

## 2024-03-16 PROCEDURE — 5A1D70Z PERFORMANCE OF URINARY FILTRATION, INTERMITTENT, LESS THAN 6 HOURS PER DAY: ICD-10-PCS | Performed by: INTERNAL MEDICINE

## 2024-03-16 PROCEDURE — 70450 CT HEAD/BRAIN W/O DYE: CPT | Performed by: EMERGENCY MEDICINE

## 2024-03-16 PROCEDURE — 93005 ELECTROCARDIOGRAM TRACING: CPT

## 2024-03-16 PROCEDURE — 80053 COMPREHEN METABOLIC PANEL: CPT | Performed by: EMERGENCY MEDICINE

## 2024-03-16 PROCEDURE — 90935 HEMODIALYSIS ONE EVALUATION: CPT | Performed by: INTERNAL MEDICINE

## 2024-03-16 PROCEDURE — 80061 LIPID PANEL: CPT | Performed by: EMERGENCY MEDICINE

## 2024-03-16 PROCEDURE — 87502 INFLUENZA DNA AMP PROBE: CPT | Performed by: EMERGENCY MEDICINE

## 2024-03-16 PROCEDURE — 87641 MR-STAPH DNA AMP PROBE: CPT | Performed by: STUDENT IN AN ORGANIZED HEALTH CARE EDUCATION/TRAINING PROGRAM

## 2024-03-16 PROCEDURE — 96375 TX/PRO/DX INJ NEW DRUG ADDON: CPT

## 2024-03-16 PROCEDURE — 96376 TX/PRO/DX INJ SAME DRUG ADON: CPT

## 2024-03-16 PROCEDURE — 96365 THER/PROPH/DIAG IV INF INIT: CPT

## 2024-03-16 PROCEDURE — 84484 ASSAY OF TROPONIN QUANT: CPT | Performed by: EMERGENCY MEDICINE

## 2024-03-16 PROCEDURE — 83880 ASSAY OF NATRIURETIC PEPTIDE: CPT | Performed by: EMERGENCY MEDICINE

## 2024-03-16 PROCEDURE — 82962 GLUCOSE BLOOD TEST: CPT

## 2024-03-16 PROCEDURE — 85025 COMPLETE CBC W/AUTO DIFF WBC: CPT | Performed by: EMERGENCY MEDICINE

## 2024-03-16 PROCEDURE — 71045 X-RAY EXAM CHEST 1 VIEW: CPT | Performed by: EMERGENCY MEDICINE

## 2024-03-16 PROCEDURE — 96366 THER/PROPH/DIAG IV INF ADDON: CPT

## 2024-03-16 RX ORDER — HEPARIN SODIUM 1000 [USP'U]/ML
1.5 INJECTION, SOLUTION INTRAVENOUS; SUBCUTANEOUS AS NEEDED
Status: DISCONTINUED | OUTPATIENT
Start: 2024-03-16 | End: 2024-03-17

## 2024-03-16 RX ORDER — LAMOTRIGINE 150 MG/1
150 TABLET ORAL DAILY
Status: DISCONTINUED | OUTPATIENT
Start: 2024-03-16 | End: 2024-03-17

## 2024-03-16 RX ORDER — NITROGLYCERIN 20 MG/100ML
INJECTION INTRAVENOUS
Status: DISCONTINUED | OUTPATIENT
Start: 2024-03-16 | End: 2024-03-16

## 2024-03-16 RX ORDER — PROCHLORPERAZINE EDISYLATE 5 MG/ML
5 INJECTION INTRAMUSCULAR; INTRAVENOUS EVERY 8 HOURS PRN
Status: DISCONTINUED | OUTPATIENT
Start: 2024-03-16 | End: 2024-03-17

## 2024-03-16 RX ORDER — ALBUMIN (HUMAN) 12.5 G/50ML
25 SOLUTION INTRAVENOUS
Status: DISCONTINUED | OUTPATIENT
Start: 2024-03-16 | End: 2024-03-17

## 2024-03-16 RX ORDER — CARVEDILOL 12.5 MG/1
25 TABLET ORAL 2 TIMES DAILY WITH MEALS
Status: DISCONTINUED | OUTPATIENT
Start: 2024-03-16 | End: 2024-03-17

## 2024-03-16 RX ORDER — ONDANSETRON 2 MG/ML
4 INJECTION INTRAMUSCULAR; INTRAVENOUS EVERY 6 HOURS PRN
Status: DISCONTINUED | OUTPATIENT
Start: 2024-03-16 | End: 2024-03-17

## 2024-03-16 RX ORDER — HYDROCODONE BITARTRATE AND ACETAMINOPHEN 5; 325 MG/1; MG/1
1 TABLET ORAL EVERY 4 HOURS PRN
Status: DISCONTINUED | OUTPATIENT
Start: 2024-03-16 | End: 2024-03-17

## 2024-03-16 RX ORDER — FUROSEMIDE 40 MG/1
80 TABLET ORAL
Status: DISCONTINUED | OUTPATIENT
Start: 2024-03-16 | End: 2024-03-17

## 2024-03-16 RX ORDER — CLONIDINE HYDROCHLORIDE 0.1 MG/1
0.1 TABLET ORAL ONCE
Status: COMPLETED | OUTPATIENT
Start: 2024-03-16 | End: 2024-03-16

## 2024-03-16 RX ORDER — FLUOXETINE HYDROCHLORIDE 20 MG/1
40 CAPSULE ORAL DAILY
Status: DISCONTINUED | OUTPATIENT
Start: 2024-03-16 | End: 2024-03-17

## 2024-03-16 RX ORDER — HEPARIN SODIUM 5000 [USP'U]/ML
5000 INJECTION, SOLUTION INTRAVENOUS; SUBCUTANEOUS EVERY 8 HOURS SCHEDULED
Status: DISCONTINUED | OUTPATIENT
Start: 2024-03-16 | End: 2024-03-17

## 2024-03-16 RX ORDER — SEVELAMER CARBONATE 800 MG/1
800 TABLET, FILM COATED ORAL
Status: DISCONTINUED | OUTPATIENT
Start: 2024-03-16 | End: 2024-03-17

## 2024-03-16 RX ORDER — BENZONATATE 100 MG/1
100 CAPSULE ORAL 3 TIMES DAILY PRN
Status: DISCONTINUED | OUTPATIENT
Start: 2024-03-16 | End: 2024-03-17

## 2024-03-16 RX ORDER — HYDRALAZINE HYDROCHLORIDE 20 MG/ML
5 INJECTION INTRAMUSCULAR; INTRAVENOUS ONCE
Status: COMPLETED | OUTPATIENT
Start: 2024-03-16 | End: 2024-03-16

## 2024-03-16 RX ORDER — HYDROCODONE BITARTRATE AND ACETAMINOPHEN 5; 325 MG/1; MG/1
2 TABLET ORAL EVERY 4 HOURS PRN
Status: DISCONTINUED | OUTPATIENT
Start: 2024-03-16 | End: 2024-03-17

## 2024-03-16 RX ORDER — HYDROMORPHONE HYDROCHLORIDE 1 MG/ML
1 INJECTION, SOLUTION INTRAMUSCULAR; INTRAVENOUS; SUBCUTANEOUS ONCE
Status: COMPLETED | OUTPATIENT
Start: 2024-03-16 | End: 2024-03-16

## 2024-03-16 RX ORDER — ACETAMINOPHEN 500 MG
500 TABLET ORAL EVERY 6 HOURS PRN
Status: DISCONTINUED | OUTPATIENT
Start: 2024-03-16 | End: 2024-03-17

## 2024-03-16 RX ORDER — ALBUTEROL SULFATE 90 UG/1
1 AEROSOL, METERED RESPIRATORY (INHALATION) EVERY 4 HOURS PRN
Status: DISCONTINUED | OUTPATIENT
Start: 2024-03-16 | End: 2024-03-17

## 2024-03-16 RX ORDER — BUPROPION HYDROCHLORIDE 150 MG/1
150 TABLET ORAL DAILY
Status: DISCONTINUED | OUTPATIENT
Start: 2024-03-16 | End: 2024-03-17

## 2024-03-16 RX ORDER — ISOSORBIDE MONONITRATE 30 MG/1
30 TABLET, EXTENDED RELEASE ORAL DAILY
Status: DISCONTINUED | OUTPATIENT
Start: 2024-03-16 | End: 2024-03-17

## 2024-03-16 RX ORDER — HYDROMORPHONE HYDROCHLORIDE 1 MG/ML
0.5 INJECTION, SOLUTION INTRAMUSCULAR; INTRAVENOUS; SUBCUTANEOUS ONCE
Status: COMPLETED | OUTPATIENT
Start: 2024-03-16 | End: 2024-03-16

## 2024-03-16 RX ORDER — CLONIDINE HYDROCHLORIDE 0.1 MG/1
0.1 TABLET ORAL 2 TIMES DAILY
Status: DISCONTINUED | OUTPATIENT
Start: 2024-03-16 | End: 2024-03-17

## 2024-03-16 RX ORDER — HYDRALAZINE HYDROCHLORIDE 50 MG/1
50 TABLET, FILM COATED ORAL 2 TIMES DAILY
Status: DISCONTINUED | OUTPATIENT
Start: 2024-03-16 | End: 2024-03-17

## 2024-03-16 RX ORDER — NITROGLYCERIN 20 MG/100ML
INJECTION INTRAVENOUS
Status: DISCONTINUED | OUTPATIENT
Start: 2024-03-16 | End: 2024-03-17

## 2024-03-16 RX ORDER — NIFEDIPINE 60 MG/1
60 TABLET, EXTENDED RELEASE ORAL 2 TIMES DAILY
Status: DISCONTINUED | OUTPATIENT
Start: 2024-03-16 | End: 2024-03-17

## 2024-03-16 RX ORDER — HYDRALAZINE HYDROCHLORIDE 20 MG/ML
10 INJECTION INTRAMUSCULAR; INTRAVENOUS ONCE
Status: COMPLETED | OUTPATIENT
Start: 2024-03-16 | End: 2024-03-16

## 2024-03-16 NOTE — ED QUICK NOTES
Lab called with critical glucose. Accucheck performed per verbal order from Dr Ray. Result 98. Verbal order to reassess in approximately 30minutes.

## 2024-03-16 NOTE — ED PROVIDER NOTES
Patient Seen in: [facility] In [place]      History     Chief Complaint   Patient presents with    Difficulty Breathing     Stated Complaint: sob, abd pain. head and neck pain. hx of compression fx of the neck.    Subjective:   HPI    43-year-old male with history of end-stage renal disease on dialysis Tuesdays, Thursday, Saturday, hypertension, presents emergency room for evaluation of shortness of breath.  Patient dates he has chronic neck pain that radiates into the head, states he was post go to pain clinic yesterday but missed his appointment.  Patient also states he missed his dialysis appointment yesterday as well.  States that he beast began having shortness of breath this afternoon that occurs with exertion, lower extremities have been getting swollen.  Patient denies any fevers or chills and denies coughing.  Denies abdominal pain.  Denies blurred vision or double vision.  States he does have dull headache, denies thunderclap type headache, denies worsening of life.  Patient requesting pain medication for his chronic neck pain.  Denies numbness tingling weakness the extremities.    Objective:   Past Medical History:   Diagnosis Date    Asthma ([facility])     Attention deficit hyperactivity disorder (ADHD)     Back problem     Bipolar 1 disorder ([facility])     Cancer ([facility])     CKD (chronic kidney disease) stage 3, GFR 30-59 ml/min ([facility])     Dr Meeks    Congestive heart disease ([facility])     COPD (chronic obstructive pulmonary disease) ([facility])     Coronary atherosclerosis     Deep vein thrombosis ([facility])     at age 17 R/T cast    Depression     Diabetes ([facility])     Essential hypertension 2014    3/21 echo: severe concentric LVH with normal EF and no MR or pHTN    Extrinsic asthma, unspecified     Heart attack ([facility])     2016- angiogram- no intervention    Heart valve disease     mitral valve repair in 1994/    High blood pressure     High cholesterol     History of mitral valve repair 12/2022     Hyperlipidemia     Low back pain     tight and stiff after sweeping and mopping    LVH (left ventricular hypertrophy)     Migraines     Mixed hyperlipidemia 2021     HDL 38 LDL 97 VLDL 57     Monoclonal gammopathy     IgG kappa     MVP (mitral valve prolapse)     Repair  at [place]; echoes as recently as 3/21 show mild or trivial MR and no stenosis    Neuropathy     Osteoarthritis     hip ,knees    Pneumonia due to organism     Pulmonary embolism (HCC) 2023    Renal disorder     TIA (transient ischemic attack) 2021    Initial history of left-sided weakness and slurred speech. (+) cocaine. MRI of the brain, CT angiogram of the head and neck, and 2D echo are all unremarkable.     TMJ (dislocation of temporomandibular joint)     Troponin level elevated 2021    Trop 60 60 47 with TIA and no CP: Lexiscan negative with EF 51              Past Surgical History:   Procedure Laterality Date    COLONOSCOPY N/A 2023    Procedure: COLONOSCOPY;  Surgeon: Heath Vu MD;  Location:  ENDOSCOPY    COLONOSCOPY N/A 2023    Procedure: COLONOSCOPY with cold snare polypectomy and forcep polypectomy;  Surgeon: Ousmane Suarez MD;  Location:  ENDOSCOPY    COLONOSCOPY & POLYPECTOMY  2019    EGD  2019    Duodenitis. Biopsied. EUS for weight loss was negative    HEART SURGERY      HERNIA SURGERY  2022    Dr Barnes    LAMINECTOMY,>2 SGMT,LUMBAR  2018    L4-L5 Decomp Discectomy ROEM L4-L5    MITRALPLASTY W CP BYPASS      [place]: Repair    REPAIR ROTATOR CUFF,CHRONIC Left     torn and had a ruptured bicep    VALVE REPAIR      mitral valve                Social History     Socioeconomic History    Marital status:    Tobacco Use    Smoking status: Former     Packs/day: 1.00     Years: 27.00     Additional pack years: 0.00     Total pack years: 27.00     Types: Cigarettes     Quit date: 2022     Years since quittin.0    Smokeless tobacco: Never   Vaping Use     Vaping Use: Never used   Substance and Sexual Activity    Alcohol use: No    Drug use: No     Social Determinants of Health     Food Insecurity: No Food Insecurity (3/8/2024)    Food Insecurity     Food Insecurity: Never true   Transportation Needs: No Transportation Needs (3/8/2024)    Transportation Needs     Lack of Transportation: No   Housing Stability: Low Risk  (3/8/2024)    Housing Stability     Housing Instability: No              Review of Systems    Positive for stated complaint: sob, abd pain. head and neck pain. hx of compression fx of the neck.  Other systems are as noted in HPI.  Constitutional and vital signs reviewed.      All other systems reviewed and negative except as noted above.    Physical Exam     ED Triage Vitals [03/15/24 2352]   BP (!) 214/133   Pulse 100   Resp 18   Temp 99.5 °F (37.5 °C)   Temp src Temporal   SpO2 100 %   O2 Device None (Room air)       Current:BP (!) 185/122   Pulse 106   Temp 99.5 °F (37.5 °C) (Temporal)   Resp 20   Ht 188 cm (6' 2\")   Wt 111.1 kg   SpO2 98%   BMI 31.46 kg/m²         Physical Exam    GENERAL: Patient is awake, alert,  HEENT: Pupils equal round reactive to light, extraocular muscles are intact, there is no scleral icterus.   Scalp is atraumatic.  NECK: Neck is supple, there is no nuchal rigidity.   HEART: Regular rate and rhythm, no murmurs.  LUNGS: Clear to auscultation bilaterally.  No Rales, no rhonchi, no wheezing, no stridor.  ABDOMEN: Soft, nondistended,non tender,  EXTREMITIES: Trace bilateral pretibial edema, no calf tenderness, dorsal pedal pulses present and equal bilaterally.  SKIN: Warm, dry, intact, no rashes.  NEUROLOGIC EXAM: Tongue midline, no facial drooping, no ptosis, muscle strength +5/5 bilateral upper and lower extremities cerebellar finger to nose intact, no pronator drift, sensation intact.        ED Course     Labs Reviewed   COMP METABOLIC PANEL (14) - Abnormal; Notable for the following components:       Result Value     CO2 20.0 (*)     BUN 60 (*)     Creatinine 6.24 (*)     Calcium, Total 7.9 (*)     Calculated Osmolality 313 (*)     eGFR-Cr 11 (*)     AST 50 (*)     A/G Ratio 0.9 (*)     All other components within normal limits   TROPONIN I HIGH SENSITIVITY - Abnormal; Notable for the following components:    Troponin I (High Sensitivity) 400 (*)     All other components within normal limits   PRO BETA NATRIURETIC PEPTIDE - Abnormal; Notable for the following components:    Pro-Beta Natriuretic Peptide 34,292 (*)     All other components within normal limits   CBC W/ DIFFERENTIAL - Abnormal; Notable for the following components:    RBC 3.10 (*)     HGB 9.6 (*)     HCT 27.6 (*)     All other components within normal limits   POCT GLUCOSE - Normal   RAPID SARS-COV-2 BY PCR - Normal   POCT FLU TEST - Normal    Narrative:     This assay is a rapid molecular in vitro test utilizing nucleic acid amplification of influenza A and B viral RNA.   CBC WITH DIFFERENTIAL WITH PLATELET    Narrative:     The following orders were created for panel order CBC With Differential With Platelet.  Procedure                               Abnormality         Status                     ---------                               -----------         ------                     CBC W/ DIFFERENTIAL[[id number]]          Abnormal            Final result                 Please view results for these tests on the individual orders.   LIPID PANEL     EKG    Rate, intervals and axes as noted on EKG Report.  Rate: 93  Rhythm: Sinus Rhythm  Reading: Normal sinus rhythm.  No ST elevation.               AP CHEST RADIOGRAPH at 12:50 AM    Comparison: 3/8/24    IMPRESSION    The cardiac silhouette is borderline in size.   Tricuspid annuloplasty again noted.  Right-sided indwelling large bore catheter at the caval atrial junction.    Mild right lower lobe and middle lobe atelectasis.  No consolidation, groundglass opacities or pulmonary edema.   Small right effusion, not  substantially changed.   No pneumothorax.    Osseous structures do not demonstrate any displaced fractures.       CT HEAD WITHOUT IV CONTRAST        IMPRESSION:  CT HEAD:  -No evidence of acute intracranial abnormality.  -No acute calvarial fracture.  -Visualized paranasal sinuses appear clear.      A total of 45 minutes of critical care time (exclusive of billable procedures) was administered to manage the patient's unstable vital signs and cardiovascular instability due to his hypertensive emergency.  This involved direct patient intervention, complex decision making, and/or extensive discussions with the patient, family, and clinical staff.    MDM        Differential diagnosis before testing includes but not limited to hypertensive emergency, hypertensive urgency, intracranial hemorrhage, pulmonary edema, electrolyte abnormality, which is a medical condition that poses a threat to life/function    Past Medical History/comorbidities-end-stage renal disease on hemodialysis        Radiographic images  I personally reviewed the radiographs and my individual interpretation shows chest x-ray no pneumothorax  I also reviewed the official reports that showed CT brain no intracranial hemorrhage, chest x-ray no pulmonary edema or pneumothorax    Discussion of management (consult/physicians, social work, pharmacy,ect) cardiology , nephrology Dr. Gamez, [facility] hospitalist physician    Medications Provided: IV hydralazine, clonidine, IV nitroglycerin    Course of Events during Emergency Room Visit include IV established patient placed on cardiac monitor and pulse ox.  Antihypertensives were given, CT the brain without acute findings.  Chest x-ray unremarkable.  Chemistry sodium 143 potassium 4.3 BUN 60 creatinine 6.24 glucose 96.  Troponin was elevated at 400.  BNP 34,000 292.  Viral testing negative.  CBC white count 7.7 hemoglobin 9.6.  Patient is still markedly hypertensive, is requiring dialysis, discussed with  cardiology, nephrology and hospitalist.  Patient be transferred from [place] and admitted to [facility] CCU.  Discussed all results with patient he agrees with plan  Shared decision making was utilized           Disposition:    Admission  I have discussed with the patient the results of test, differential diagnosis, and treatment plan. They expressed clear understanding of these instructions and agrees to the plan provided.       Note to patient: The 21st Century Cures Act makes medical notes like these available to patients in the interest of transparency. However, this is a medical document intended as peer to peer communication. It is written in medical language and may contain abbreviations or verbiage that are unfamiliar. It may appear blunt or direct. Medical documents are intended to carry relevant information, facts as evident, and the clinical opinion of the practitioner.           Admission disposition: 3/16/2024  3:46 AM                                        Medical Decision Making      Disposition and Plan     Clinical Impression:  1. ESRD needing dialysis (HCC)    2. Elevated troponin    3. Hypertensive emergency    4. Acute nonintractable headache, unspecified headache type         Disposition:  Admit  3/16/2024  3:46 am    Follow-up:  No follow-up provider specified.        Medications Prescribed:  Current Discharge Medication List                            Hospital Problems       Present on Admission  Date Reviewed: 1/3/2024            ICD-10-CM Noted POA    * (Principal) Hypertensive urgency I16.0 11/29/2020 Unknown

## 2024-03-16 NOTE — CONSULTS
[facility] Cardiology  Report of Consultation    Godwin Fonseca Patient Status:  Inpatient    1980 MRN [id number]   Location [facility] 6NE-A Attending Minna Costello, DO   Hosp Day # 0 PCP Prieto Novak MD     Reason for Consultation:   SOB    History of Present Illness:   Godwin Fonseca is a(n) 43 year old male with ESRD (HD dependent) and hx MV repair as a young man who presented with HTN and SOB.    He notes he missed HD last week at least once.    He notes he's had a cough.  It has been mildly productive.  He also notes he has been SOB.  He has had RAMSAY with mild exertion.  He denies any CP.  He denies LE edema.      No other systemic symptoms noted outside of chronic neck pain, for which he sees the pain clinic.    No PND, palpitations, or syncope.    In the ER his BP was >200 systolic.  He was treated with IV hydralizine and IV NTG gtt.  His BNP was 35K and he was admitted to the CCU.    His troponin was mildly elevated.  Cardiology consultation was obtained.    Dialysis just started and he is already feeling better.  Breathing has improved.    Past Medical History:   Past Medical History:   Diagnosis Date    Asthma ([facility])     Attention deficit hyperactivity disorder (ADHD)     Back problem     Bipolar 1 disorder (HCC)     Cancer ([facility])     CKD (chronic kidney disease) stage 3, GFR 30-59 ml/min ([facility])     Dr Meeks    Congestive heart disease ([facility])     COPD (chronic obstructive pulmonary disease) ([facility])     Coronary atherosclerosis     Deep vein thrombosis ([facility])     at age 17 R/T cast    Depression     Diabetes ([facility])     Essential hypertension 2014    3/21 echo: severe concentric LVH with normal EF and no MR or pHTN    Extrinsic asthma, unspecified     Heart attack (HCC)     2016- angiogram- no intervention    Heart valve disease     mitral valve repair in /    High blood pressure     High cholesterol     History of mitral valve repair 2022    Hyperlipidemia     Low back pain      tight and stiff after sweeping and mopping    LVH (left ventricular hypertrophy)     Migraines     Mixed hyperlipidemia 03/2021     HDL 38 LDL 97 VLDL 57     Monoclonal gammopathy     IgG kappa     MVP (mitral valve prolapse)     Repair 1994 at [facility]; echoes as recently as 3/21 show mild or trivial MR and no stenosis    Neuropathy     Osteoarthritis     hip ,knees    Pneumonia due to organism     Pulmonary embolism (HCC) 01/2023    Renal disorder     TIA (transient ischemic attack) 03/2021    Initial history of left-sided weakness and slurred speech. (+) cocaine. MRI of the brain, CT angiogram of the head and neck, and 2D echo are all unremarkable.     TMJ (dislocation of temporomandibular joint)     Troponin level elevated 03/2021    Trop 60 60 47 with TIA and no CP: Lexiscan negative with EF 51       Social History:   Smoking:  None  Alcohol:  None    Family History:   No family history of premature arthrosclerotic heart disease     Medications:   Scheduled:    ARIPiprazole  15 mg Oral Daily    buPROPion ER  150 mg Oral Daily    carvedilol  25 mg Oral BID with meals    cloNIDine  0.1 mg Oral BID    FLUoxetine HCl  40 mg Oral Daily    furosemide  80 mg Oral BID (Diuretic)    hydrALAZINE  50 mg Oral BID    isosorbide mononitrate ER  30 mg Oral Daily    lamoTRIgine  150 mg Oral Daily    NIFEdipine ER  60 mg Oral BID    sevelamer carbonate  800 mg Oral TID CC    umeclidinium bromide  1 puff Inhalation Daily    heparin  5,000 Units Subcutaneous Q8H MARTHA       Continuous Infusion:    nitroGLYCERIN in dextrose 5% 25 mcg/min (03/16/24 0830)       PRN Medications:   sodium chloride **AND** albumin human, heparin, acetaminophen, albuterol, benzonatate, ondansetron, prochlorperazine, HYDROcodone-acetaminophen **OR** HYDROcodone-acetaminophen    Outpatient Medications:   Current Facility-Administered Medications on File Prior to Encounter   Medication Dose Route Frequency Provider Last Rate Last Admin     [COMPLETED] HYDROmorphone (Dilaudid) 1 MG/ML injection 1 mg  1 mg Intravenous Once Terry Lew MD   1 mg at 24 1320    [COMPLETED] labetalol (Trandate) 5 mg/mL injection 20 mg  20 mg Intravenous Once Terry Lew MD   20 mg at 24 1320    [COMPLETED] hydrALAzine (Apresoline) 20 mg/mL injection 20 mg  20 mg Intravenous Once Terry Lew MD   20 mg at 24 1417    [COMPLETED] labetalol (Trandate) 5 mg/mL injection 40 mg  40 mg Intravenous Once Terry Lew MD   40 mg at 24 1449    [COMPLETED] heparin (Porcine) 1000 UNIT/ML injection 1,500 Units  1.5 mL Intracatheter Once Samaria Nava MD   1,500 Units at 24 2100    [COMPLETED] labetalol (Trandate) 5 mg/mL injection 10 mg  10 mg Intravenous Once Jhonny Rebolledo MD   10 mg at 24 0357    [COMPLETED] butalbital-acetaminophen-caffeine (Fioricet) -40 MG per tab 1 tablet  1 tablet Oral Once Jhonny Rebolledo MD   1 tablet at 24 0245    [COMPLETED] acetaminophen (Tylenol Extra Strength) 500 MG tab        Given at 24 0300    [] sodium chloride 0.9 % IV bolus 100 mL  100 mL Intravenous Q30 Min PRN Toni, Ali, DO        And    [] albumin human (Albumin) 25% injection 25 g  25 g Intravenous PRN Dialysis Toni, Ali, DO        [COMPLETED] heparin (Porcine) 1000 UNIT/ML injection 2,000 Units  2,000 Units Intravenous Once Toni Ali, DO   2,000 Units at 24 1820    [COMPLETED] hydrALAzine (Apresoline) 20 mg/mL injection 10 mg  10 mg Intravenous Once Jhonny Rebolledo MD   10 mg at 24 2225    [COMPLETED] sodium chloride 0.9 % IV bolus 500 mL  500 mL Intravenous Once Davis Bob MD   Stopped at 24 2137    [COMPLETED] acetaminophen (Tylenol Extra Strength) tab 1,000 mg  1,000 mg Oral Once Davsi Bob MD   1,000 mg at 24    [COMPLETED] morphINE PF 4 MG/ML injection 2 mg  2 mg Intravenous Once Davis Bob MD   2 mg at 24    [COMPLETED] polyethylene  glycol-electrolyte (Golytely) 236 g oral solution 4,000 mL  4,000 mL Oral Once Ousmane Suarez MD   4,000 mL at 12/29/23 1220    [COMPLETED] morphINE PF 4 MG/ML injection 4 mg  4 mg Intravenous Once Davis Bob MD   4 mg at 12/28/23 0841    [COMPLETED] cloNIDine (Catapres) tab 0.1 mg  0.1 mg Oral Once Davis Bob MD   0.1 mg at 12/28/23 1157    [COMPLETED] labetalol (Trandate) 5 mg/mL injection 20 mg  20 mg Intravenous Once Davis Bob MD   20 mg at 12/28/23 1039    [COMPLETED] polyethylene glycol-electrolyte (Golytely) 236 g oral solution 4,000 mL  4,000 mL Oral Once Ousmane Suarez MD   2,000 mL at 12/29/23 0534     Current Outpatient Medications on File Prior to Encounter   Medication Sig Dispense Refill    isosorbide mononitrate ER 30 MG Oral Tablet 24 Hr Take 1 tablet (30 mg total) by mouth daily. Hold if systolic blood pressure <130      tamsulosin 0.4 MG Oral Cap Take 1 capsule (0.4 mg total) by mouth daily.      Lisdexamfetamine Dimesylate 60 MG Oral Cap Take 1 capsule (60 mg total) by mouth every morning.      buPROPion  MG Oral Tablet 24 Hr Take 1 tablet (150 mg total) by mouth daily.      polyethylene glycol, PEG 3350, 17 g Oral Powd Pack Take 17 g by mouth in the morning and 17 g before bedtime. 60 packet 0    lamoTRIgine (LAMICTAL) 150 MG Oral Tab Take 1 tablet (150 mg total) by mouth daily. 7 tablet 0    FLUoxetine HCl 40 MG Oral Cap Take 1 capsule (40 mg total) by mouth daily. 7 capsule 0    Hyoscyamine Sulfate 0.125 MG Oral Tab Take 1 tablet (0.125 mg total) by mouth every 6 (six) hours as needed for Cramping.      potassium chloride 20 MEQ Oral Tab CR Take 1 tablet (20 mEq total) by mouth daily.      RENVELA 800 MG Oral Tab Take 1 tablet (800 mg total) by mouth 3 (three) times daily with meals.      losartan 100 MG Oral Tab Take 1 tablet (100 mg total) by mouth daily. Hold if systolic blood pressure <130      furosemide 80 MG Oral Tab Take 1 tablet (80 mg total) by mouth 2  (two) times daily. Hold if systolic blood pressure <130      hydrALAZINE 50 MG Oral Tab Take 1 tablet (50 mg total) by mouth in the morning and 1 tablet (50 mg total) before bedtime. Hold if systolic blood pressure <130. 30 tablet 0    NIFEdipine ER 60 MG Oral Tablet 24 Hr Take 1 tablet (60 mg total) by mouth in the morning and 1 tablet (60 mg total) before bedtime. Hold if systolic blood pressure <130.      cloNIDine 0.1 MG Oral Tab Take 1 tablet (0.1 mg total) by mouth 2 (two) times daily. 60 tablet 0    ergocalciferol 1.25 MG (16923 UT) Oral Cap Take 1 capsule (50,000 Units total) by mouth Every Monday.      carvedilol 25 MG Oral Tab Take 1 tablet (25 mg total) by mouth in the morning and 1 tablet (25 mg total) in the evening. Take with meals. 60 tablet 6    Tiotropium Bromide Monohydrate (SPIRIVA HANDIHALER) 18 MCG Inhalation Cap 1 capsule (18 mcg total) daily.      Fenofibrate 134 MG Oral Cap Take 1 capsule by mouth nightly. 90 capsule 1    TRULICITY 0.75 MG/0.5ML Subcutaneous Solution Pen-injector once a week. EVERY MONDAY      Fluticasone Propionate 50 MCG/ACT Nasal Suspension SPRAY ONCE INTO EACH NOSTRIL BID PRN 15.8 mL 0    benzonatate 100 MG Oral Cap Take 1 capsule (100 mg total) by mouth 3 (three) times daily as needed for cough. 30 capsule 0    ARIPiprazole 15 MG Oral Tab Take 1 tablet (15 mg total) by mouth daily.      bisacodyl 5 MG Oral Tab EC Take 3 tablets (15 mg total) by mouth daily. 30 tablet 0    albuterol 108 (90 Base) MCG/ACT Inhalation Aero Soln albuterol sulfate HFA 90 mcg/actuation aerosol inhaler   INHALE 1 TO 2 PUFFS BY MOUTH EVERY 4-6 HOURS AS NEEDED FOR COUGH OR WHEEZING      acetaminophen 500 MG Oral Tab Take 1 tablet (500 mg total) by mouth every 6 (six) hours as needed for Pain.         Allergies:   Allergies   Allergen Reactions    Hydrochlorothiazide RASH and HIVES       Review of Systems:   No fevers, chills, change in weight or bowel habits.  Ten point review of systems is  otherwise negative or unremarkable.    Physical Exam:   Vitals:    03/16/24 0900   BP: 133/90   Pulse: 88   Resp: 17   Temp:      Wt Readings from Last 3 Encounters:   03/16/24 245 lb (111.1 kg)   03/10/24 246 lb 14.6 oz (112 kg)   01/05/24 250 lb (113.4 kg)           General: Well developed, well nourished male.  Pt is in no acute distress.  HEENT:   Normocephalic.  Atraumatic.  Eyes with no scleral icterus.  Neck: Supple.  No JVD.  Carotids 2+ and equal in symmetric fashion.  No bruits are noted.  Cardiac: Regular rate and rhythm.   There is a normal S1 and S2.  No S3 or S4.  No murmurs, rubs, or gallops.  PMI is non-displaced with a normal apical impulse.  Lungs: Clear to ascultation bilaterally.  No focal rales, rhonchi, or wheezes.  Good air movement is noted throughout all lung fields.  Abdomen: Soft.  Non-distended.  Non-tender.  Bowel sounds are present and normoactive.  No guarding or rebound.   Extremities: Extremities do not demonstrate any evidence of peripheral edema.   No cyanosis or clubbing of the digits is appreciated.  Femoral, Dorsalis Pedis, and Posterior Tibialis  pulses are 2+ and equal in a symmetric fashion.  Neurologic: Alert and oriented, normal affect.  No gross deficit appreciated.  Integument:  No visible rashes are appreciated.      Laboratories and Data:   Labs:    Recent Labs   Lab 03/10/24  0722 03/16/24  0059   * 96   BUN 63* 60*   CREATSERUM 6.79* 6.24*   CA 8.0* 7.9*   ALB 3.4 3.4    143   K 3.8 4.3    112   CO2 24.0 20.0*   ALKPHO  --  102   AST  --  50*   ALT  --  32   BILT  --  0.5   TP  --  7.3       Recent Labs   Lab 03/16/24  0059   RBC 3.10*   HGB 9.6*   HCT 27.6*   MCV 89.0   MCH 31.0   MCHC 34.8   RDW 14.4   NEPRELIM 5.08   WBC 7.7   .0       No results for input(s): \"PTP\", \"INR\" in the last 168 hours.    No results for input(s): \"TROP\", \"CK\" in the last 168 hours.    Diagnostics:   Tele: NSR.  EKG: Sinus.  LVH strain.  Echo: 3/2024  Conclusions:    1. Left ventricle: The cavity size was normal. There is moderarte concentric      left ventricular hypertrophy Systolic function was normal. The estimated      ejection fraction was 55-60%, by visual assessment. Wall motion is      normal; there are no regional wall motion abnormalities.   2. Mitral valve: There was mild to moderate regurgitation. There is history      of annular ring repair The mean diastolic gradient was 2mm Hg.         Assessment:  1. HTN urgency/emergency  2. ESRD, HD dependent  3. Hx MV repair  4. Anemia        Plan:  1. HD initiated.  Suspect it will improve SOB and HTN dramatically.  Will give baseline meds.  Follow BP, wean NTG drip to off.  Nephrology following and directing HD.  2. MV repair years ago and last echo (a week ago) showed mild to moderate MR.  Follow for now.  3. Lipids: Last panel from last week looked good.  Does not appear to need a statin on the basis of cholesterol level.    >35 min critical care time spent on this CCU patient given acuity and medical complexity.    Benja Reyes MD  3/16/2024  9:04 AM

## 2024-03-16 NOTE — PLAN OF CARE
Resting in bed.  Patient received HD this AM, 4.5L off-tolerated well.  SR on monitors. Nitro gtt weaned off early this AM.  Neck pain noted, PRN norcos given with some relief.

## 2024-03-16 NOTE — H&P
[facility] Hospitalist H&P       CC:   Chief Complaint   Patient presents with    Difficulty Breathing        PCP: Prieto Novak MD    History of Present Illness: Patient is a 43 year old male with PMH sig for ESRD on hemodialysis, HFpEF, type 2 diabetes, COPD, chronic abdominal pain, depression, bipolar/ADHD with multiple ER and hospital admissions presented to the hospital for difficulty breathing and chest discomfort.  Patient states that his last dialysis was on Tuesday.  He was feeling ill since then he experienced fevers and chills and felt very fatigued to go to his dialysis session on Thursday.  States that his fevers have resolved but he still feels very weak and have not been able to walk without shortness of breath.  In the ER, vital signs significant for tachycardia, blood pressure of 214/133, breathing on room air.  Labs significant for elevated troponin in the setting of ESRD.  Patient was started on nitroglycerin drip and sent to the ICU for further management.  When I evaluated the patient he was getting hemodialysis.      PMH  Past Medical History:   Diagnosis Date    Asthma ([facility])     Attention deficit hyperactivity disorder (ADHD)     Back problem     Bipolar 1 disorder (HCC)     Cancer (HCC)     CKD (chronic kidney disease) stage 3, GFR 30-59 ml/min ([facility])     Dr Meeks    Congestive heart disease (HCC)     COPD (chronic obstructive pulmonary disease) ([facility])     Coronary atherosclerosis     Deep vein thrombosis ([facility])     at age 17 R/T cast    Depression     Diabetes ([facility])     Essential hypertension 2014    3/21 echo: severe concentric LVH with normal EF and no MR or pHTN    Extrinsic asthma, unspecified     Heart attack (HCC)     2016- angiogram- no intervention    Heart valve disease     mitral valve repair in 1994/    High blood pressure     High cholesterol     History of mitral valve repair 12/2022    Hyperlipidemia     Low back pain     tight and stiff after sweeping and mopping    LVH (left  ventricular hypertrophy)     Migraines     Mixed hyperlipidemia 03/2021     HDL 38 LDL 97 VLDL 57     Monoclonal gammopathy     IgG kappa     MVP (mitral valve prolapse)     Repair 1994 at [place]; echoes as recently as 3/21 show mild or trivial MR and no stenosis    Neuropathy     Osteoarthritis     hip ,knees    Pneumonia due to organism     Pulmonary embolism (HCC) 01/2023    Renal disorder     TIA (transient ischemic attack) 03/2021    Initial history of left-sided weakness and slurred speech. (+) cocaine. MRI of the brain, CT angiogram of the head and neck, and 2D echo are all unremarkable.     TMJ (dislocation of temporomandibular joint)     Troponin level elevated 03/2021    Trop 60 60 47 with TIA and no CP: Lexiscan negative with EF 51        PSH  Past Surgical History:   Procedure Laterality Date    COLONOSCOPY N/A 03/26/2023    Procedure: COLONOSCOPY;  Surgeon: Heath Vu MD;  Location:  ENDOSCOPY    COLONOSCOPY N/A 12/30/2023    Procedure: COLONOSCOPY with cold snare polypectomy and forcep polypectomy;  Surgeon: Ousmane Suarez MD;  Location:  ENDOSCOPY    COLONOSCOPY & POLYPECTOMY  2019    EGD  2019    Duodenitis. Biopsied. EUS for weight loss was negative    HEART SURGERY      HERNIA SURGERY  08/17/2022    Dr Barnes    LAMINECTOMY,>2 SGMT,LUMBAR  09/06/2018    L4-L5 Decomp Discectomy ROEM L4-L5    MITRALPLASTY W CP BYPASS  1994    [place]: Repair    REPAIR ROTATOR CUFF,CHRONIC Left     torn and had a ruptured bicep    VALVE REPAIR  1994    mitral valve        ALL:  Allergies   Allergen Reactions    Hydrochlorothiazide RASH and HIVES        Home Medications:  Outpatient Medications Marked as Taking for the 3/16/24 encounter (Hospital Encounter)   Medication Sig Dispense Refill    isosorbide mononitrate ER 30 MG Oral Tablet 24 Hr Take 1 tablet (30 mg total) by mouth daily. Hold if systolic blood pressure <130      tamsulosin 0.4 MG Oral Cap Take 1 capsule (0.4 mg total) by mouth daily.       Lisdexamfetamine Dimesylate 60 MG Oral Cap Take 1 capsule (60 mg total) by mouth every morning.      buPROPion  MG Oral Tablet 24 Hr Take 1 tablet (150 mg total) by mouth daily.      polyethylene glycol, PEG 3350, 17 g Oral Powd Pack Take 17 g by mouth in the morning and 17 g before bedtime. 60 packet 0    lamoTRIgine (LAMICTAL) 150 MG Oral Tab Take 1 tablet (150 mg total) by mouth daily. 7 tablet 0    FLUoxetine HCl 40 MG Oral Cap Take 1 capsule (40 mg total) by mouth daily. 7 capsule 0    Hyoscyamine Sulfate 0.125 MG Oral Tab Take 1 tablet (0.125 mg total) by mouth every 6 (six) hours as needed for Cramping.      potassium chloride 20 MEQ Oral Tab CR Take 1 tablet (20 mEq total) by mouth daily.      RENVELA 800 MG Oral Tab Take 1 tablet (800 mg total) by mouth 3 (three) times daily with meals.      losartan 100 MG Oral Tab Take 1 tablet (100 mg total) by mouth daily. Hold if systolic blood pressure <130      furosemide 80 MG Oral Tab Take 1 tablet (80 mg total) by mouth 2 (two) times daily. Hold if systolic blood pressure <130      hydrALAZINE 50 MG Oral Tab Take 1 tablet (50 mg total) by mouth in the morning and 1 tablet (50 mg total) before bedtime. Hold if systolic blood pressure <130. 30 tablet 0    NIFEdipine ER 60 MG Oral Tablet 24 Hr Take 1 tablet (60 mg total) by mouth in the morning and 1 tablet (60 mg total) before bedtime. Hold if systolic blood pressure <130.      cloNIDine 0.1 MG Oral Tab Take 1 tablet (0.1 mg total) by mouth 2 (two) times daily. 60 tablet 0    ergocalciferol 1.25 MG (87597 UT) Oral Cap Take 1 capsule (50,000 Units total) by mouth Every Monday.      carvedilol 25 MG Oral Tab Take 1 tablet (25 mg total) by mouth in the morning and 1 tablet (25 mg total) in the evening. Take with meals. 60 tablet 6    Tiotropium Bromide Monohydrate (SPIRIVA HANDIHALER) 18 MCG Inhalation Cap 1 capsule (18 mcg total) daily.      Fenofibrate 134 MG Oral Cap Take 1 capsule by mouth nightly. 90  capsule 1    TRULICITY 0.75 MG/0.5ML Subcutaneous Solution Pen-injector once a week. EVERY MONDAY      Fluticasone Propionate 50 MCG/ACT Nasal Suspension SPRAY ONCE INTO EACH NOSTRIL BID PRN 15.8 mL 0         Soc Hx  Social History     Tobacco Use    Smoking status: Former     Packs/day: 1.00     Years: 27.00     Additional pack years: 0.00     Total pack years: 27.00     Types: Cigarettes     Quit date: 2022     Years since quittin.0    Smokeless tobacco: Never   Substance Use Topics    Alcohol use: No        Fam Hx  Family History   Problem Relation Age of Onset    Hypertension Father     Alcohol and Other Disorders Associated Father     Substance Abuse Father         cocaine    Dementia Father     Cancer Father         lung    Diabetes Mother     Cancer Mother         multiple myeloma    Hypertension Mother     Anxiety Maternal Aunt     Depression Maternal Aunt     Anxiety Maternal Aunt     Depression Maternal Aunt     Bipolar Disorder Maternal Aunt     Diabetes Maternal Grandmother     Hypertension Maternal Grandmother     Cancer Maternal Grandfather         stomach cancer    Diabetes Maternal Grandfather     Hypertension Maternal Grandfather     Alcohol and Other Disorders Associated Maternal Grandfather     Hypertension Paternal Grandmother     Hypertension Paternal Grandfather     Cancer Sister         uterine and ovarian    Hypertension Sister     Cancer Maternal Uncle         lung    Cancer Paternal Aunt         throat       Review of Systems  Comprehensive ROS reviewed and negative except for what's stated above.     OBJECTIVE:  /90   Pulse 88   Temp 98.6 °F (37 °C) (Temporal)   Resp 17   Ht 6' 2.02\" (1.88 m)   Wt 245 lb (111.1 kg)   SpO2 97%   BMI 31.44 kg/m²   General:  Alert, no distress, appears weak   Head:  Normocephalic, without obvious abnormality, atraumatic.   Eyes:  Sclera anicteric, No conjunctival pallor, EOMs intact.    Nose: Nares normal. Septum midline. Mucosa normal.  No drainage.   Throat: Lips, mucosa, and tongue normal   Neck: Supple, symmetrical, trachea midline,   Lungs:   Clear to auscultation bilaterally. Normal effort   Chest wall:  No tenderness or deformity.   Heart:  Regular rate and rhythm,   Abdomen:   Soft, non-tender. Bowel sounds normal. No masses,  No organomegaly. Non distended   Extremities: Extremities normal, atraumatic, no cyanosis or edema.   Skin: Skin color, texture, turgor normal. No rashes or lesions.    Neurologic: Normal strength, no focal deficit appreciated     Diagnostic Data:    CBC/Chem  Recent Labs   Lab 03/16/24 0059   WBC 7.7   HGB 9.6*   MCV 89.0   .0       Recent Labs   Lab 03/10/24  0722 03/16/24  0059    143   K 3.8 4.3    112   CO2 24.0 20.0*   BUN 63* 60*   CREATSERUM 6.79* 6.24*   * 96   CA 8.0* 7.9*   MG 2.6  --    PHOS 5.3*  --        Recent Labs   Lab 03/10/24  0722 03/16/24  0059   ALT  --  32   AST  --  50*   ALB 3.4 3.4       No results for input(s): \"TROP\" in the last 168 hours.    CXR: image personally reviewed     Radiology: XR CHEST AP PORTABLE  (CPT=71045)    Result Date: 3/16/2024  CONCLUSION:  See above.   LOCATION:  [facility]      Dictated by (CST): Smith Randle MD on 3/16/2024 at 7:03 AM     Finalized by (CST): Smith Randle MD on 3/16/2024 at 7:05 AM       CT BRAIN OR HEAD (49975)    Result Date: 3/16/2024  CONCLUSION:  Negative for acute intracranial process.    LOCATION:  [facility]   Dictated by (CST): Claude Singh MD on 3/16/2024 at 6:53 AM     Finalized by (CST): Claude Singh MD on 3/16/2024 at 6:53 AM          ASSESSMENT / PLAN:     Patient is a 43 year old male with PMH sig for ESRD on hemodialysis, HFpEF, type 2 diabetes, COPD, chronic abdominal pain, depression, bipolar/ADHD with multiple ER and hospital admissions presented to the hospital for difficulty breathing and chest discomfort.     Dyspnea  Hypertensive emergency  - Started on nitroglycerin drip, now off  - Cardiology following,  recommendations reviewed  - Fluid removal with HD  - Resume home medications-Coreg, clonidine, hydralazine, furosemide, isosorbide, nifedipine  -Monitored in ICU, if BP stable off nitro drip    Elevated troponin  - In the setting of ESRD and hypertensive emergency  - No acute intervention at this time  - Cardiology following, not concerning for ACS    ESRD on hemodialysis  - Stat dialysis this morning  - TTS schedule  - Renal consult, recommendations reviewed  - Social work consult-multiple missed dialysis sessions    Type 2 diabetes with hyperglycemia  - Accu-Cheks, ISS    COPD, not in exacerbation  - Resume home inhalers    Major depressive disorder/bipolar/ADHD  - Resume fluoxetine, bupropion, Abilify    Chronic pain  - Follows with pain clinic outpatient  - Resume home pain medications    FN:  - IVF: None  - Diet: Renal    DVT Prophy: SCDs, heparin subcu  Atrophy: Ambulate as tolerated  Lines: PIV    Dispo: pending clinical course    Outpatient records or previous hospital records reviewed.     Further recommendations pending patient's clinical course.  [facility] hospitalist to continue to follow patient while in house    Patient and/or patient's family given opportunity to ask questions and note understanding and agreeing with therapeutic plan as outlined    Thank You,  DO Kevin Pritchard Hospitalist  Answering Service number: [phone]

## 2024-03-16 NOTE — ED INITIAL ASSESSMENT (HPI)
Patient with shortness of breath and neck pain. Patient has chronic pain and was supposed to go to pain clinic for injection yesterday but missed his appointment. Shortness of breath began this afternoon. Discharged from the hospital last weekend.

## 2024-03-16 NOTE — ED QUICK NOTES
Lab called stating they reran the glucose on previous specimen. Result updated in chart, 96. Discussed with Dr Ray, repeat glucose not longer needed at this time.

## 2024-03-16 NOTE — CM/SW NOTE
03/16/24 1500   CM/SW Referral Data   Referral Source Social Work (self-referral)   Reason for Referral Discharge planning   Informant Patient   Readmission Assessment   Factors that patient feels contributed to this readmission Acute/Chronic Clinical Presentation;Psychosocial/Socioeconomic Issue   Pt's living situation prior to admission? Home with family   Medical Hx   Does patient have an established PCP? Yes   Significant Past Medical/Mental Health Hx ESRD on HD   Patient Info   Patient's Current Mental Status at Time of Assessment Alert;Oriented   Patient Status Prior to Admission   Services in place prior to admission Dialysis   Dialysis Clinic [facility]   Dialysis scheduled time 1400   Discharge Needs   Anticipated D/C needs To be determined       Received order to address pt's missed HD sessions. Spoke with pt regarding this. Pt voiced that he was sick this past week and missed 2 HD sessions. Pt has expressed interest in having an earlier HD chair time, but it has been a challenge. SW encouraged pt to continue to work with the SW at [facility] as they are there as a support to the pt. Pt understanding. Pt states that he drives himself to/from dialysis. Pt denies any other needs at this time. Support provided.     Raven Hyman, MSW, LSW  Discharge Planner

## 2024-03-16 NOTE — CONSULTS
[facility] - Nephrology  Report of Consultation    Godwin Fonseca Patient Status:  Inpatient    1980 MRN [id number]   Location [facility] 6NE-A Attending Minna Costello DO   Hosp Day # 0 PCP Prieto Novak MD     Reason for consult: ESRD  Requesting physician: Minna Costello DO   Date of consultation: 3/16/2024     HISTORY OF PRESENT ILLNESS:     Godwin Fonseca is a 43 year old male with past medical history significant for ESRD on HD TTS, hypertension, diastolic CHF, COPD, history of mitral valve repair who presented to the hospital with hypertension and acute shortness of breath.  Patient notes his last dialysis treatment to be on Tuesday.  He missed his Thursday session.  Patient was recent admitted on 3/8 with similar complaints.  Historically patient has high intradialytic weight gain.    REVIEW OF SYSTEMS:     Please see HPI for pertinent positives. 10 point review of systems otherwise reviewed and negative.     HISTORY:     Past Medical History:   Diagnosis Date    Asthma (HCC)     Attention deficit hyperactivity disorder (ADHD)     Back problem     Bipolar 1 disorder (HCC)     Cancer (HCC)     CKD (chronic kidney disease) stage 3, GFR 30-59 ml/min (HCC)     Dr Meeks    Congestive heart disease (HCC)     COPD (chronic obstructive pulmonary disease) (HCC)     Coronary atherosclerosis     Deep vein thrombosis (HCC)     at age 17 R/T cast    Depression     Diabetes (HCC)     Essential hypertension 2014    3/21 echo: severe concentric LVH with normal EF and no MR or pHTN    Extrinsic asthma, unspecified     Heart attack (HCC)     2016- angiogram- no intervention    Heart valve disease     mitral valve repair in /    High blood pressure     High cholesterol     History of mitral valve repair 2022    Hyperlipidemia     Low back pain     tight and stiff after sweeping and mopping    LVH (left ventricular hypertrophy)     Migraines     Mixed hyperlipidemia 2021     HDL 38  LDL 97 VLDL 57     Monoclonal gammopathy     IgG kappa     MVP (mitral valve prolapse)     Repair 1994 at [place]; echoes as recently as 3/21 show mild or trivial MR and no stenosis    Neuropathy     Osteoarthritis     hip ,knees    Pneumonia due to organism     Pulmonary embolism (HCC) 01/2023    Renal disorder     TIA (transient ischemic attack) 03/2021    Initial history of left-sided weakness and slurred speech. (+) cocaine. MRI of the brain, CT angiogram of the head and neck, and 2D echo are all unremarkable.     TMJ (dislocation of temporomandibular joint)     Troponin level elevated 03/2021    Trop 60 60 47 with TIA and no CP: Lexiscan negative with EF 51     Past Surgical History:   Procedure Laterality Date    COLONOSCOPY N/A 03/26/2023    Procedure: COLONOSCOPY;  Surgeon: Heath Vu MD;  Location:  ENDOSCOPY    COLONOSCOPY N/A 12/30/2023    Procedure: COLONOSCOPY with cold snare polypectomy and forcep polypectomy;  Surgeon: Ousmane Suarez MD;  Location:  ENDOSCOPY    COLONOSCOPY & POLYPECTOMY  2019    EGD  2019    Duodenitis. Biopsied. EUS for weight loss was negative    HEART SURGERY      HERNIA SURGERY  08/17/2022    Dr Barnes    LAMINECTOMY,>2 SGMT,LUMBAR  09/06/2018    L4-L5 Decomp Discectomy ROEM L4-L5    MITRALPLASTY W CP BYPASS  1994    [place]: Repair    REPAIR ROTATOR CUFF,CHRONIC Left     torn and had a ruptured bicep    VALVE REPAIR  1994    mitral valve     Family History   Problem Relation Age of Onset    Hypertension Father     Alcohol and Other Disorders Associated Father     Substance Abuse Father         cocaine    Dementia Father     Cancer Father         lung    Diabetes Mother     Cancer Mother         multiple myeloma    Hypertension Mother     Anxiety Maternal Aunt     Depression Maternal Aunt     Anxiety Maternal Aunt     Depression Maternal Aunt     Bipolar Disorder Maternal Aunt     Diabetes Maternal Grandmother     Hypertension Maternal Grandmother     Cancer  Maternal Grandfather         stomach cancer    Diabetes Maternal Grandfather     Hypertension Maternal Grandfather     Alcohol and Other Disorders Associated Maternal Grandfather     Hypertension Paternal Grandmother     Hypertension Paternal Grandfather     Cancer Sister         uterine and ovarian    Hypertension Sister     Cancer Maternal Uncle         lung    Cancer Paternal Aunt         throat      reports that he quit smoking about 2 years ago. His smoking use included cigarettes. He has a 27 pack-year smoking history. He has never used smokeless tobacco. He reports that he does not drink alcohol and does not use drugs.    ALLERGIES:     Allergies   Allergen Reactions    Hydrochlorothiazide RASH and HIVES       MEDICATIONS:       Current Facility-Administered Medications:     sodium chloride 0.9 % IV bolus 100 mL, 100 mL, Intravenous, Q30 Min PRN **AND** albumin human (Albumin) 25% injection 25 g, 25 g, Intravenous, PRN Dialysis    heparin (Porcine) 1000 UNIT/ML injection 1,500 Units, 1.5 mL, Intracatheter, PRN    acetaminophen (Tylenol Extra Strength) tab 500 mg, 500 mg, Oral, Q6H PRN    albuterol (Ventolin HFA) 108 (90 Base) MCG/ACT inhaler 1 puff, 1 puff, Inhalation, Q4H PRN    ARIPiprazole (Abilify) tab 15 mg, 15 mg, Oral, Daily    benzonatate (Tessalon) cap 100 mg, 100 mg, Oral, TID PRN    buPROPion ER (Wellbutrin XL) 24 hr tab 150 mg, 150 mg, Oral, Daily    carvedilol (Coreg) tab 25 mg, 25 mg, Oral, BID with meals    cloNIDine (Catapres) tab 0.1 mg, 0.1 mg, Oral, BID    FLUoxetine (PROzac) cap 40 mg, 40 mg, Oral, Daily    furosemide (Lasix) tab 80 mg, 80 mg, Oral, BID (Diuretic)    hydrALAZINE (Apresoline) tab 50 mg, 50 mg, Oral, BID    isosorbide mononitrate ER (Imdur) 24 hr tab 30 mg, 30 mg, Oral, Daily    lamoTRIgine (LaMICtal) tab 150 mg, 150 mg, Oral, Daily    NIFEdipine ER (Procardia-XL) 24 hr tab 60 mg, 60 mg, Oral, BID    sevelamer carbonate (Renvela) tab 800 mg, 800 mg, Oral, TID CC     umeclidinium bromide (Incruse Ellipta) 62.5 MCG/ACT inhaler 1 puff, 1 puff, Inhalation, Daily    heparin (Porcine) 5000 UNIT/ML injection 5,000 Units, 5,000 Units, Subcutaneous, Q8H MARTHA    ondansetron (Zofran) 4 MG/2ML injection 4 mg, 4 mg, Intravenous, Q6H PRN    prochlorperazine (Compazine) 10 MG/2ML injection 5 mg, 5 mg, Intravenous, Q8H PRN    nitroGLYCERIN in dextrose 5% 50 mg/250mL infusion premix, 5-20 mcg/min, Intravenous, Titrated    HYDROcodone-acetaminophen (Norco) 5-325 MG per tab 1 tablet, 1 tablet, Oral, Q4H PRN **OR** HYDROcodone-acetaminophen (Norco) 5-325 MG per tab 2 tablet, 2 tablet, Oral, Q4H PRN    Medications Prior to Admission   Medication Sig Dispense Refill Last Dose    isosorbide mononitrate ER 30 MG Oral Tablet 24 Hr Take 1 tablet (30 mg total) by mouth daily. Hold if systolic blood pressure <130   Past Month    tamsulosin 0.4 MG Oral Cap Take 1 capsule (0.4 mg total) by mouth daily.   Past Month    Lisdexamfetamine Dimesylate 60 MG Oral Cap Take 1 capsule (60 mg total) by mouth every morning.   Past Month    buPROPion  MG Oral Tablet 24 Hr Take 1 tablet (150 mg total) by mouth daily.   Past Month    polyethylene glycol, PEG 3350, 17 g Oral Powd Pack Take 17 g by mouth in the morning and 17 g before bedtime. 60 packet 0 Past Month    lamoTRIgine (LAMICTAL) 150 MG Oral Tab Take 1 tablet (150 mg total) by mouth daily. 7 tablet 0 Past Month    FLUoxetine HCl 40 MG Oral Cap Take 1 capsule (40 mg total) by mouth daily. 7 capsule 0 Past Month    Hyoscyamine Sulfate 0.125 MG Oral Tab Take 1 tablet (0.125 mg total) by mouth every 6 (six) hours as needed for Cramping.   Past Month    potassium chloride 20 MEQ Oral Tab CR Take 1 tablet (20 mEq total) by mouth daily.   Past Month    RENVELA 800 MG Oral Tab Take 1 tablet (800 mg total) by mouth 3 (three) times daily with meals.   Past Month    losartan 100 MG Oral Tab Take 1 tablet (100 mg total) by mouth daily. Hold if systolic blood pressure  <130   Past Month    furosemide 80 MG Oral Tab Take 1 tablet (80 mg total) by mouth 2 (two) times daily. Hold if systolic blood pressure <130   Past Month    hydrALAZINE 50 MG Oral Tab Take 1 tablet (50 mg total) by mouth in the morning and 1 tablet (50 mg total) before bedtime. Hold if systolic blood pressure <130. 30 tablet 0 Past Month    NIFEdipine ER 60 MG Oral Tablet 24 Hr Take 1 tablet (60 mg total) by mouth in the morning and 1 tablet (60 mg total) before bedtime. Hold if systolic blood pressure <130.   Past Month    cloNIDine 0.1 MG Oral Tab Take 1 tablet (0.1 mg total) by mouth 2 (two) times daily. 60 tablet 0 Past Month    ergocalciferol 1.25 MG (70753 UT) Oral Cap Take 1 capsule (50,000 Units total) by mouth Every Monday.   Past Month    carvedilol 25 MG Oral Tab Take 1 tablet (25 mg total) by mouth in the morning and 1 tablet (25 mg total) in the evening. Take with meals. 60 tablet 6 Past Month    Tiotropium Bromide Monohydrate (SPIRIVA HANDIHALER) 18 MCG Inhalation Cap 1 capsule (18 mcg total) daily.   Past Month    Fenofibrate 134 MG Oral Cap Take 1 capsule by mouth nightly. 90 capsule 1 Past Month    TRULICITY 0.75 MG/0.5ML Subcutaneous Solution Pen-injector once a week. EVERY MONDAY   Past Month    Fluticasone Propionate 50 MCG/ACT Nasal Suspension SPRAY ONCE INTO EACH NOSTRIL BID PRN 15.8 mL 0 Past Month    benzonatate 100 MG Oral Cap Take 1 capsule (100 mg total) by mouth 3 (three) times daily as needed for cough. 30 capsule 0 Unknown    ARIPiprazole 15 MG Oral Tab Take 1 tablet (15 mg total) by mouth daily.   Unknown    bisacodyl 5 MG Oral Tab EC Take 3 tablets (15 mg total) by mouth daily. 30 tablet 0 Unknown    albuterol 108 (90 Base) MCG/ACT Inhalation Aero Soln albuterol sulfate HFA 90 mcg/actuation aerosol inhaler   INHALE 1 TO 2 PUFFS BY MOUTH EVERY 4-6 HOURS AS NEEDED FOR COUGH OR WHEEZING   Unknown    acetaminophen 500 MG Oral Tab Take 1 tablet (500 mg total) by mouth every 6 (six) hours  as needed for Pain.   Unknown          PHYSICAL EXAM:     Vital Signs: BP (!) 123/100   Pulse 85   Temp 98.5 °F (36.9 °C) (Temporal)   Resp 13   Ht 6' 2.02\" (1.88 m)   Wt 245 lb (111.1 kg)   SpO2 96%   BMI 31.44 kg/m²   Temp (24hrs), Av.8 °F (37.1 °C), Min:98.5 °F (36.9 °C), Max:99.5 °F (37.5 °C)       Intake/Output Summary (Last 24 hours) at 3/16/2024 1251  Last data filed at 3/16/2024 0900  Gross per 24 hour   Intake 480 ml   Output --   Net 480 ml     Wt Readings from Last 3 Encounters:   24 245 lb (111.1 kg)   03/10/24 246 lb 14.6 oz (112 kg)   24 250 lb (113.4 kg)       General: NAD  HEENT: NCAT  Neck: Supple  Cardiac: Regular rate and rhythm, no murmurs  Lungs: CTAB, no crackles   Abdomen: Soft, non-tender, non-distended  Extremities: no LE edema   Neurologic/Psych: mentating well    LABORATORY DATA:       Lab Results   Component Value Date    GLU 96 2024    BUN 60 (H) 2024    BUNCREA 13.0 2022    CREATSERUM 6.24 (H) 2024    ANIONGAP 11 2024    GFR 59 (L) 2018    GFRNAA 30 (L) 2022    GFRAA 35 (L) 2022    CA 7.9 (L) 2024    OSMOCALC 313 (H) 2024    ALKPHO 102 2024    AST 50 (H) 2024    ALT 32 2024    BILT 0.5 2024    TP 7.3 2024    ALB 3.4 2024    GLOBULIN 3.9 2024     2024    K 4.3 2024     2024    CO2 20.0 (L) 2024     Lab Results   Component Value Date    WBC 7.7 2024    RBC 3.10 (L) 2024    HGB 9.6 (L) 2024    HCT 27.6 (L) 2024    .0 2024    MPV 11.5 2012    MCV 89.0 2024    MCH 31.0 2024    MCHC 34.8 2024    RDW 14.4 2024    NEPRELIM 5.08 2024    NEPERCENT 66.1 2024    LYPERCENT 18.2 2024    MOPERCENT 12.7 2024    EOPERCENT 1.4 2024    BAPERCENT 1.2 2024    NE 5.08 2024    LYMABS 1.40 2024    MOABSO 0.98 2024    EOABSO 0.11  03/16/2024    BAABSO 0.09 03/16/2024     Lab Results   Component Value Date    MALBP 167.00 05/24/2023    CREUR 142.00 05/24/2023    CREUR 142.00 05/24/2023     Lab Results   Component Value Date    COLORUR Yellow 01/03/2024    CLARITY Clear 01/03/2024    SPECGRAVITY 1.025 01/03/2024    GLUUR Negative 01/03/2024    BILUR Negative 01/03/2024    KETUR Trace (A) 01/03/2024    BLOODURINE Negative 01/03/2024    PHURINE 5.5 01/03/2024    PROUR >=300 (A) 01/03/2024    UROBILINOGEN 0.2 01/03/2024    NITRITE Negative 01/03/2024    LEUUR Negative 01/03/2024    WBCUR 6-10 (A) 01/03/2024    RBCUR 0-2 01/03/2024    EPIUR Few (A) 01/03/2024    BACUR Rare (A) 01/03/2024    CAOXUR Occasional (A) 04/20/2018    HYLUR Present (A) 05/14/2019         IMPRESSION/RECOMMENDATIONS:     ESRD  Hypertension  -PTA regimen:coreg 25 BID, clonidine 0.1 at bedtime, lasix 80 daily, hydralazine 50 BID, imdur 30 daily, losartan 100 daily, nifedipine ER 60 BID   Anemia  Hyperphosphatemia    HD today, 4 L UF.  HD directly managed.  Blood pressure improved with dialysis.  Continue home medications.  No OWEN with HD today due to severe hypertension.  Renal diet.    DC per DO Kevin Dotson [facility]   Nephrology

## 2024-03-17 VITALS
BODY MASS INDEX: 31.44 KG/M2 | WEIGHT: 245 LBS | SYSTOLIC BLOOD PRESSURE: 107 MMHG | TEMPERATURE: 97 F | HEART RATE: 83 BPM | HEIGHT: 74.02 IN | RESPIRATION RATE: 18 BRPM | DIASTOLIC BLOOD PRESSURE: 63 MMHG | OXYGEN SATURATION: 98 %

## 2024-03-17 LAB
ALBUMIN SERPL-MCNC: 3.5 G/DL (ref 3.4–5)
ANION GAP SERPL CALC-SCNC: 6 MMOL/L (ref 0–18)
BASOPHILS # BLD AUTO: 0.09 X10(3) UL (ref 0–0.2)
BASOPHILS NFR BLD AUTO: 1.3 %
BUN BLD-MCNC: 35 MG/DL (ref 9–23)
CALCIUM BLD-MCNC: 8.6 MG/DL (ref 8.5–10.1)
CHLORIDE SERPL-SCNC: 110 MMOL/L (ref 98–112)
CO2 SERPL-SCNC: 23 MMOL/L (ref 21–32)
CREAT BLD-MCNC: 4.62 MG/DL
EGFRCR SERPLBLD CKD-EPI 2021: 15 ML/MIN/1.73M2 (ref 60–?)
EOSINOPHIL # BLD AUTO: 0.15 X10(3) UL (ref 0–0.7)
EOSINOPHIL NFR BLD AUTO: 2.1 %
ERYTHROCYTE [DISTWIDTH] IN BLOOD BY AUTOMATED COUNT: 14.3 %
GLUCOSE BLD-MCNC: 115 MG/DL (ref 70–99)
HCT VFR BLD AUTO: 30.8 %
HGB BLD-MCNC: 10.8 G/DL
IMM GRANULOCYTES # BLD AUTO: 0.02 X10(3) UL (ref 0–1)
IMM GRANULOCYTES NFR BLD: 0.3 %
LYMPHOCYTES # BLD AUTO: 2.06 X10(3) UL (ref 1–4)
LYMPHOCYTES NFR BLD AUTO: 29.4 %
MAGNESIUM SERPL-MCNC: 2.2 MG/DL (ref 1.6–2.6)
MCH RBC QN AUTO: 31.1 PG (ref 26–34)
MCHC RBC AUTO-ENTMCNC: 35.1 G/DL (ref 31–37)
MCV RBC AUTO: 88.8 FL
MONOCYTES # BLD AUTO: 0.75 X10(3) UL (ref 0.1–1)
MONOCYTES NFR BLD AUTO: 10.7 %
NEUTROPHILS # BLD AUTO: 3.94 X10 (3) UL (ref 1.5–7.7)
NEUTROPHILS # BLD AUTO: 3.94 X10(3) UL (ref 1.5–7.7)
NEUTROPHILS NFR BLD AUTO: 56.2 %
OSMOLALITY SERPL CALC.SUM OF ELEC: 297 MOSM/KG (ref 275–295)
PHOSPHATE SERPL-MCNC: 3.8 MG/DL (ref 2.5–4.9)
PLATELET # BLD AUTO: 243 10(3)UL (ref 150–450)
POTASSIUM SERPL-SCNC: 3.8 MMOL/L (ref 3.5–5.1)
RBC # BLD AUTO: 3.47 X10(6)UL
SODIUM SERPL-SCNC: 139 MMOL/L (ref 136–145)
WBC # BLD AUTO: 7 X10(3) UL (ref 4–11)

## 2024-03-17 PROCEDURE — 83735 ASSAY OF MAGNESIUM: CPT | Performed by: STUDENT IN AN ORGANIZED HEALTH CARE EDUCATION/TRAINING PROGRAM

## 2024-03-17 PROCEDURE — 80069 RENAL FUNCTION PANEL: CPT | Performed by: STUDENT IN AN ORGANIZED HEALTH CARE EDUCATION/TRAINING PROGRAM

## 2024-03-17 PROCEDURE — 85025 COMPLETE CBC W/AUTO DIFF WBC: CPT | Performed by: STUDENT IN AN ORGANIZED HEALTH CARE EDUCATION/TRAINING PROGRAM

## 2024-03-17 RX ORDER — DEXTROAMPHETAMINE SACCHARATE, AMPHETAMINE ASPARTATE, DEXTROAMPHETAMINE SULFATE AND AMPHETAMINE SULFATE 2.5; 2.5; 2.5; 2.5 MG/1; MG/1; MG/1; MG/1
10 TABLET ORAL
Status: DISCONTINUED | OUTPATIENT
Start: 2024-03-17 | End: 2024-03-17

## 2024-03-17 NOTE — PLAN OF CARE
Assumed care of pt @ 1930.  Rec'd pt in bed, resting.  Pt. Asymptomatic @ this time.  VSS, afebrile.  CTU orders noted.

## 2024-03-17 NOTE — PROGRESS NOTES
[facility]  Report of Consultation    Godwin Fonseca Patient Status:  Inpatient    1980 MRN [id number]   Location [facility] 6NE-A Attending Minna Costello, DO   Hosp Day # 1 PCP Prieto Novak MD     Reason for Consultation:   SOB    History of Present Illness:   Godwin Fonseca is a(n) 43 year old male with ESRD (HD dependent) and hx MV repair as a young man who presented with HTN and SOB.    He notes he missed HD last week at least once.    Subjective:  -No overnight events.  -Notes breathing markedly improved.  -No CP.    -Feels ready to leave.      Past Medical History:   Past Medical History:   Diagnosis Date    Asthma ([facility])     Attention deficit hyperactivity disorder (ADHD)     Back problem     Bipolar 1 disorder ([facility])     Cancer ([facility])     CKD (chronic kidney disease) stage 3, GFR 30-59 ml/min ([facility])     Dr Meeks    Congestive heart disease ([facility])     COPD (chronic obstructive pulmonary disease) ([facility])     Coronary atherosclerosis     Deep vein thrombosis ([facility])     at age 17 R/T cast    Depression     Diabetes ([facility])     Essential hypertension 2014    3/21 echo: severe concentric LVH with normal EF and no MR or pHTN    Extrinsic asthma, unspecified     Heart attack ([facility])     - angiogram- no intervention    Heart valve disease     mitral valve repair in /    High blood pressure     High cholesterol     History of mitral valve repair 2022    Hyperlipidemia     Low back pain     tight and stiff after sweeping and mopping    LVH (left ventricular hypertrophy)     Migraines     Mixed hyperlipidemia 2021     HDL 38 LDL 97 VLDL 57     Monoclonal gammopathy     IgG kappa     MVP (mitral valve prolapse)     Repair  at [place]; echoes as recently as 3/21 show mild or trivial MR and no stenosis    Neuropathy     Osteoarthritis     hip ,knees    Pneumonia due to organism     Pulmonary embolism (HCC) 2023    Renal disorder     TIA (transient ischemic attack)  03/2021    Initial history of left-sided weakness and slurred speech. (+) cocaine. MRI of the brain, CT angiogram of the head and neck, and 2D echo are all unremarkable.     TMJ (dislocation of temporomandibular joint)     Troponin level elevated 03/2021    Trop 60 60 47 with TIA and no CP: Lexiscan negative with EF 51       Social History:   Smoking:  None  Alcohol:  None    Family History:   No family history of premature arthrosclerotic heart disease     Medications:   Scheduled:    ARIPiprazole  15 mg Oral Daily    buPROPion ER  150 mg Oral Daily    carvedilol  25 mg Oral BID with meals    cloNIDine  0.1 mg Oral BID    FLUoxetine HCl  40 mg Oral Daily    furosemide  80 mg Oral BID (Diuretic)    hydrALAZINE  50 mg Oral BID    isosorbide mononitrate ER  30 mg Oral Daily    lamoTRIgine  150 mg Oral Daily    NIFEdipine ER  60 mg Oral BID    sevelamer carbonate  800 mg Oral TID CC    umeclidinium bromide  1 puff Inhalation Daily    heparin  5,000 Units Subcutaneous Q8H MARTHA       Continuous Infusion:    nitroGLYCERIN in dextrose 5% Stopped (03/16/24 0900)       PRN Medications:   sodium chloride **AND** albumin human, heparin, acetaminophen, albuterol, benzonatate, ondansetron, prochlorperazine, HYDROcodone-acetaminophen **OR** HYDROcodone-acetaminophen    Outpatient Medications:   Current Facility-Administered Medications on File Prior to Encounter   Medication Dose Route Frequency Provider Last Rate Last Admin    [COMPLETED] HYDROmorphone (Dilaudid) 1 MG/ML injection 1 mg  1 mg Intravenous Once Terry Lew MD   1 mg at 03/08/24 1320    [COMPLETED] labetalol (Trandate) 5 mg/mL injection 20 mg  20 mg Intravenous Once Terry Lew MD   20 mg at 03/08/24 1320    [COMPLETED] hydrALAzine (Apresoline) 20 mg/mL injection 20 mg  20 mg Intravenous Once Terry Lew MD   20 mg at 03/08/24 1417    [COMPLETED] labetalol (Trandate) 5 mg/mL injection 40 mg  40 mg Intravenous Once Terry Lew MD   40 mg at  24 1449    [COMPLETED] heparin (Porcine) 1000 UNIT/ML injection 1,500 Units  1.5 mL Intracatheter Once Samaria Nava MD   1,500 Units at 24 2100    [COMPLETED] labetalol (Trandate) 5 mg/mL injection 10 mg  10 mg Intravenous Once Jhonny Rebolledo MD   10 mg at 24 0357    [COMPLETED] butalbital-acetaminophen-caffeine (Fioricet) -40 MG per tab 1 tablet  1 tablet Oral Once Jhonny Rebolledo MD   1 tablet at 24 0245    [COMPLETED] acetaminophen (Tylenol Extra Strength) 500 MG tab        Given at 24 0300    [] sodium chloride 0.9 % IV bolus 100 mL  100 mL Intravenous Q30 Min PRN Toni, Ali, DO        And    [] albumin human (Albumin) 25% injection 25 g  25 g Intravenous PRN Dialysis Toni, Ali, DO        [COMPLETED] heparin (Porcine) 1000 UNIT/ML injection 2,000 Units  2,000 Units Intravenous Once Toni, Ali, DO   2,000 Units at 24 1820    [COMPLETED] hydrALAzine (Apresoline) 20 mg/mL injection 10 mg  10 mg Intravenous Once Jhonny Rebolledo MD   10 mg at 24 2225    [COMPLETED] sodium chloride 0.9 % IV bolus 500 mL  500 mL Intravenous Once Davis Bob MD   Stopped at 24 2137    [COMPLETED] acetaminophen (Tylenol Extra Strength) tab 1,000 mg  1,000 mg Oral Once Davis Bob MD   1,000 mg at 24 2300    [COMPLETED] morphINE PF 4 MG/ML injection 2 mg  2 mg Intravenous Once Davis Bob MD   2 mg at 24 2300    [COMPLETED] polyethylene glycol-electrolyte (Golytely) 236 g oral solution 4,000 mL  4,000 mL Oral Once Ousmane Suarez MD   4,000 mL at 23 1220    [COMPLETED] morphINE PF 4 MG/ML injection 4 mg  4 mg Intravenous Once Davis Bob MD   4 mg at 23 0841    [COMPLETED] cloNIDine (Catapres) tab 0.1 mg  0.1 mg Oral Once Davis Bob MD   0.1 mg at 23 1157    [COMPLETED] labetalol (Trandate) 5 mg/mL injection 20 mg  20 mg Intravenous Once Davis Bob MD   20 mg at 23 1039    [COMPLETED]  polyethylene glycol-electrolyte (Golytely) 236 g oral solution 4,000 mL  4,000 mL Oral Once Ousmane Suarez MD   2,000 mL at 12/29/23 1736     Current Outpatient Medications on File Prior to Encounter   Medication Sig Dispense Refill    isosorbide mononitrate ER 30 MG Oral Tablet 24 Hr Take 1 tablet (30 mg total) by mouth daily. Hold if systolic blood pressure <130      tamsulosin 0.4 MG Oral Cap Take 1 capsule (0.4 mg total) by mouth daily.      Lisdexamfetamine Dimesylate 60 MG Oral Cap Take 1 capsule (60 mg total) by mouth every morning.      buPROPion  MG Oral Tablet 24 Hr Take 1 tablet (150 mg total) by mouth daily.      polyethylene glycol, PEG 3350, 17 g Oral Powd Pack Take 17 g by mouth in the morning and 17 g before bedtime. 60 packet 0    lamoTRIgine (LAMICTAL) 150 MG Oral Tab Take 1 tablet (150 mg total) by mouth daily. 7 tablet 0    FLUoxetine HCl 40 MG Oral Cap Take 1 capsule (40 mg total) by mouth daily. 7 capsule 0    Hyoscyamine Sulfate 0.125 MG Oral Tab Take 1 tablet (0.125 mg total) by mouth every 6 (six) hours as needed for Cramping.      potassium chloride 20 MEQ Oral Tab CR Take 1 tablet (20 mEq total) by mouth daily.      RENVELA 800 MG Oral Tab Take 1 tablet (800 mg total) by mouth 3 (three) times daily with meals.      losartan 100 MG Oral Tab Take 1 tablet (100 mg total) by mouth daily. Hold if systolic blood pressure <130      furosemide 80 MG Oral Tab Take 1 tablet (80 mg total) by mouth 2 (two) times daily. Hold if systolic blood pressure <130      hydrALAZINE 50 MG Oral Tab Take 1 tablet (50 mg total) by mouth in the morning and 1 tablet (50 mg total) before bedtime. Hold if systolic blood pressure <130. 30 tablet 0    NIFEdipine ER 60 MG Oral Tablet 24 Hr Take 1 tablet (60 mg total) by mouth in the morning and 1 tablet (60 mg total) before bedtime. Hold if systolic blood pressure <130.      cloNIDine 0.1 MG Oral Tab Take 1 tablet (0.1 mg total) by mouth 2 (two) times daily. 60  tablet 0    ergocalciferol 1.25 MG (36861 UT) Oral Cap Take 1 capsule (50,000 Units total) by mouth Every Monday.      carvedilol 25 MG Oral Tab Take 1 tablet (25 mg total) by mouth in the morning and 1 tablet (25 mg total) in the evening. Take with meals. 60 tablet 6    Tiotropium Bromide Monohydrate (SPIRIVA HANDIHALER) 18 MCG Inhalation Cap 1 capsule (18 mcg total) daily.      Fenofibrate 134 MG Oral Cap Take 1 capsule by mouth nightly. 90 capsule 1    TRULICITY 0.75 MG/0.5ML Subcutaneous Solution Pen-injector once a week. EVERY MONDAY      Fluticasone Propionate 50 MCG/ACT Nasal Suspension SPRAY ONCE INTO EACH NOSTRIL BID PRN 15.8 mL 0    benzonatate 100 MG Oral Cap Take 1 capsule (100 mg total) by mouth 3 (three) times daily as needed for cough. 30 capsule 0    ARIPiprazole 15 MG Oral Tab Take 1 tablet (15 mg total) by mouth daily.      bisacodyl 5 MG Oral Tab EC Take 3 tablets (15 mg total) by mouth daily. 30 tablet 0    albuterol 108 (90 Base) MCG/ACT Inhalation Aero Soln albuterol sulfate HFA 90 mcg/actuation aerosol inhaler   INHALE 1 TO 2 PUFFS BY MOUTH EVERY 4-6 HOURS AS NEEDED FOR COUGH OR WHEEZING      acetaminophen 500 MG Oral Tab Take 1 tablet (500 mg total) by mouth every 6 (six) hours as needed for Pain.         Allergies:   Allergies   Allergen Reactions    Hydrochlorothiazide RASH and HIVES       Review of Systems:   No fevers, chills, change in weight or bowel habits.  Ten point review of systems is otherwise negative or unremarkable.    Physical Exam:   Vitals:    03/17/24 0800   BP: (!) 161/111   Pulse: 84   Resp: 20   Temp: 98.3 °F (36.8 °C)     Wt Readings from Last 3 Encounters:   03/16/24 245 lb (111.1 kg)   03/10/24 246 lb 14.6 oz (112 kg)   01/05/24 250 lb (113.4 kg)           General: Well developed, well nourished male.  Pt is in no acute distress.  HEENT:   Normocephalic.  Atraumatic.  Eyes with no scleral icterus.  Neck: Supple.  No JVD.  Carotids 2+ and equal in symmetric fashion.   No bruits are noted.  Cardiac: Regular rate and rhythm.   There is a normal S1 and S2.  No S3 or S4.  No murmurs, rubs, or gallops.  PMI is non-displaced with a normal apical impulse.  Lungs: Clear to ascultation bilaterally.  No focal rales, rhonchi, or wheezes.  Good air movement is noted throughout all lung fields.  Abdomen: Soft.  Non-distended.  Non-tender.  Bowel sounds are present and normoactive.  No guarding or rebound.   Extremities: Extremities do not demonstrate any evidence of peripheral edema.   No cyanosis or clubbing of the digits is appreciated.  Femoral, Dorsalis Pedis, and Posterior Tibialis  pulses are 2+ and equal in a symmetric fashion.  Neurologic: Alert and oriented, normal affect.  No gross deficit appreciated.  Integument:  No visible rashes are appreciated.      Laboratories and Data:   Labs:    Recent Labs   Lab 03/16/24 0059 03/17/24  0505   GLU 96 115*   BUN 60* 35*   CREATSERUM 6.24* 4.62*   CA 7.9* 8.6   ALB 3.4 3.5    139   K 4.3 3.8    110   CO2 20.0* 23.0   ALKPHO 102  --    AST 50*  --    ALT 32  --    BILT 0.5  --    TP 7.3  --        Recent Labs   Lab 03/16/24 0059 03/17/24  0505   RBC 3.10* 3.47*   HGB 9.6* 10.8*   HCT 27.6* 30.8*   MCV 89.0 88.8   MCH 31.0 31.1   MCHC 34.8 35.1   RDW 14.4 14.3   NEPRELIM 5.08 3.94   WBC 7.7 7.0   .0 243.0       No results for input(s): \"PTP\", \"INR\" in the last 168 hours.    No results for input(s): \"TROP\", \"CK\" in the last 168 hours.    Diagnostics:   Tele: NSR.  EKG: Sinus.  LVH strain.  Echo: 3/2024  Conclusions:   1. Left ventricle: The cavity size was normal. There is moderarte concentric      left ventricular hypertrophy Systolic function was normal. The estimated      ejection fraction was 55-60%, by visual assessment. Wall motion is      normal; there are no regional wall motion abnormalities.   2. Mitral valve: There was mild to moderate regurgitation. There is history      of annular ring repair The mean diastolic  gradient was 2mm Hg.         Assessment:  1. HTN urgency/emergency  2. ESRD, HD dependent  3. Hx MV repair  4. Anemia        Plan:  1. HD initiated.  Suspect it will improve SOB and HTN dramatically.  Will give baseline meds.   Nephrology following and directing HD.  BP appears appropriate overall.  2. MV repair years ago and last echo (a week ago) showed mild to moderate MR.  Follow for now.  3. Lipids: Last panel from last week looked good.  Does not appear to need a statin on the basis of cholesterol level.  Does need outpatient FU for CV risk management.    Ok to DC home from CV perspective.  No changes in home meds from baseline.  Would suggest FU in approximately 4 weeks with Cardiology.  Can FU with me or APN.    >35 min critical care time spent on this CCU patient given acuity and medical complexity.    Benja Reyes MD  3/16/2024  9:04 AM

## 2024-03-17 NOTE — PROGRESS NOTES
Nephrology Progress Note    Godwin Fonseca Attending:  Minna Costello DO       SUBJECTIVE:     Blood pressure better  Off oxygen    PHYSICAL EXAM:     Vital Signs: /79   Pulse 88   Temp 98.3 °F (36.8 °C) (Temporal)   Resp 18   Ht 6' 2.02\" (1.88 m)   Wt 245 lb (111.1 kg)   SpO2 97%   BMI 31.44 kg/m²   Temp (24hrs), Av.3 °F (36.8 °C), Min:97.8 °F (36.6 °C), Max:98.8 °F (37.1 °C)       Intake/Output Summary (Last 24 hours) at 3/17/2024 0907  Last data filed at 3/17/2024 0800  Gross per 24 hour   Intake 1200 ml   Output 4800 ml   Net -3600 ml     Wt Readings from Last 3 Encounters:   24 245 lb (111.1 kg)   03/10/24 246 lb 14.6 oz (112 kg)   24 250 lb (113.4 kg)       General: pleasant, well appearing  HEENT: NCAT  Neck: Supple  Cardiac: Regular rate and rhythm, no murmurs  Lungs: CTAB  Abdomen: Soft, non-tender, non-distended  Extremities: no LE edema  Neurologic/Psych: mentating well       LABORATORY DATA:     Lab Results   Component Value Date     (H) 2024    BUN 35 (H) 2024    BUNCREA 13.0 2022    CREATSERUM 4.62 (H) 2024    ANIONGAP 6 2024    GFR 59 (L) 2018    GFRNAA 30 (L) 2022    GFRAA 35 (L) 2022    CA 8.6 2024    OSMOCALC 297 (H) 2024    ALKPHO 102 2024    AST 50 (H) 2024    ALT 32 2024    BILT 0.5 2024    TP 7.3 2024    ALB 3.5 2024    GLOBULIN 3.9 2024     2024    K 3.8 2024     2024    CO2 23.0 2024     Lab Results   Component Value Date    WBC 7.0 2024    RBC 3.47 (L) 2024    HGB 10.8 (L) 2024    HCT 30.8 (L) 2024    .0 2024    MPV 11.5 2012    MCV 88.8 2024    MCH 31.1 2024    MCHC 35.1 2024    RDW 14.3 2024    NEPRELIM 3.94 2024    NEPERCENT 56.2 2024    LYPERCENT 29.4 2024    MOPERCENT 10.7 2024    EOPERCENT 2.1 2024    BAPERCENT 1.3  03/17/2024    NE 3.94 03/17/2024    LYMABS 2.06 03/17/2024    MOABSO 0.75 03/17/2024    EOABSO 0.15 03/17/2024    BAABSO 0.09 03/17/2024     Lab Results   Component Value Date    MALBP 167.00 05/24/2023    CREUR 142.00 05/24/2023    CREUR 142.00 05/24/2023     Lab Results   Component Value Date    COLORUR Yellow 01/03/2024    CLARITY Clear 01/03/2024    SPECGRAVITY 1.025 01/03/2024    GLUUR Negative 01/03/2024    BILUR Negative 01/03/2024    KETUR Trace (A) 01/03/2024    BLOODURINE Negative 01/03/2024    PHURINE 5.5 01/03/2024    PROUR >=300 (A) 01/03/2024    UROBILINOGEN 0.2 01/03/2024    NITRITE Negative 01/03/2024    LEUUR Negative 01/03/2024    WBCUR 6-10 (A) 01/03/2024    RBCUR 0-2 01/03/2024    EPIUR Few (A) 01/03/2024    BACUR Rare (A) 01/03/2024    CAOXUR Occasional (A) 04/20/2018    HYLUR Present (A) 05/14/2019         IMAGING:     New studies reviewed    MEDICATIONS:       Current Facility-Administered Medications   Medication Dose Route Frequency    sodium chloride 0.9 % IV bolus 100 mL  100 mL Intravenous Q30 Min PRN    And    albumin human (Albumin) 25% injection 25 g  25 g Intravenous PRN Dialysis    heparin (Porcine) 1000 UNIT/ML injection 1,500 Units  1.5 mL Intracatheter PRN    acetaminophen (Tylenol Extra Strength) tab 500 mg  500 mg Oral Q6H PRN    albuterol (Ventolin HFA) 108 (90 Base) MCG/ACT inhaler 1 puff  1 puff Inhalation Q4H PRN    ARIPiprazole (Abilify) tab 15 mg  15 mg Oral Daily    benzonatate (Tessalon) cap 100 mg  100 mg Oral TID PRN    buPROPion ER (Wellbutrin XL) 24 hr tab 150 mg  150 mg Oral Daily    carvedilol (Coreg) tab 25 mg  25 mg Oral BID with meals    cloNIDine (Catapres) tab 0.1 mg  0.1 mg Oral BID    FLUoxetine (PROzac) cap 40 mg  40 mg Oral Daily    furosemide (Lasix) tab 80 mg  80 mg Oral BID (Diuretic)    hydrALAZINE (Apresoline) tab 50 mg  50 mg Oral BID    isosorbide mononitrate ER (Imdur) 24 hr tab 30 mg  30 mg Oral Daily    lamoTRIgine (LaMICtal) tab 150 mg  150 mg  Oral Daily    NIFEdipine ER (Procardia-XL) 24 hr tab 60 mg  60 mg Oral BID    sevelamer carbonate (Renvela) tab 800 mg  800 mg Oral TID CC    umeclidinium bromide (Incruse Ellipta) 62.5 MCG/ACT inhaler 1 puff  1 puff Inhalation Daily    heparin (Porcine) 5000 UNIT/ML injection 5,000 Units  5,000 Units Subcutaneous Q8H MARTHA    ondansetron (Zofran) 4 MG/2ML injection 4 mg  4 mg Intravenous Q6H PRN    prochlorperazine (Compazine) 10 MG/2ML injection 5 mg  5 mg Intravenous Q8H PRN    nitroGLYCERIN in dextrose 5% 50 mg/250mL infusion premix  5-20 mcg/min Intravenous Titrated    HYDROcodone-acetaminophen (Norco) 5-325 MG per tab 1 tablet  1 tablet Oral Q4H PRN    Or    HYDROcodone-acetaminophen (Norco) 5-325 MG per tab 2 tablet  2 tablet Oral Q4H PRN       ASSESSMENT/PLAN:     ESRD  Hypertension  -PTA regimen:coreg 25 BID, clonidine 0.1 at bedtime, lasix 80 daily, hydralazine 50 BID, imdur 30 daily, losartan 100 daily, nifedipine ER 60 BID   Anemia  Hyperphosphatemia     Status post HD 3/16.  No acute indication for dialysis today.  Blood pressure improved with dialysis.  Continue home medications.  OWEN with HD  Renal diet.     Okay for discharge today.  Needs to stop missing his dialysis treatments and reduce fluid intake. D/w pt.       Tal Saul DO  [facility]

## 2024-03-17 NOTE — PLAN OF CARE
Received patient resting in bed.  Denies SOB, CP, and says breathing and cough are better.  Ok to discharge home per consults.    Discharged home with all belongings. VSS. All questions answered. Plan to attend dialysis as scheduled-next appointment on Tuesday.

## 2024-03-18 LAB
ATRIAL RATE: 93 BPM
P AXIS: 30 DEGREES
P-R INTERVAL: 196 MS
Q-T INTERVAL: 382 MS
QRS DURATION: 94 MS
QTC CALCULATION (BEZET): 474 MS
R AXIS: 13 DEGREES
T AXIS: 121 DEGREES
VENTRICULAR RATE: 93 BPM

## 2024-03-18 NOTE — PAYOR COMM NOTE
Discharge Notification    Patient Name: CHU VACA  Payor: [employer]  Subscriber #: [id number]  Authorization Number: [id number]  Admit Date/Time: 3/16/2024 12:18 AM  Discharge Date/Time: 3/17/2024 1:54 PM

## 2024-03-21 PROBLEM — J06.9 UPPER RESPIRATORY TRACT INFECTION, UNSPECIFIED TYPE: Status: RESOLVED | Noted: 2022-04-17 | Resolved: 2024-03-21

## 2024-03-23 ENCOUNTER — APPOINTMENT (OUTPATIENT)
Dept: CT IMAGING | Facility: HOSPITAL | Age: 44
End: 2024-03-23
Attending: STUDENT IN AN ORGANIZED HEALTH CARE EDUCATION/TRAINING PROGRAM
Payer: MEDICARE

## 2024-03-23 ENCOUNTER — HOSPITAL ENCOUNTER (OUTPATIENT)
Facility: HOSPITAL | Age: 44
Discharge: HOME | End: 2024-03-25
Attending: STUDENT IN AN ORGANIZED HEALTH CARE EDUCATION/TRAINING PROGRAM | Admitting: INTERNAL MEDICINE
Payer: MEDICARE

## 2024-03-23 DIAGNOSIS — N18.6 CHRONIC KIDNEY DISEASE WITH END STAGE RENAL FAILURE ON DIALYSIS (HCC): ICD-10-CM

## 2024-03-23 DIAGNOSIS — K92.2 GASTROINTESTINAL HEMORRHAGE, UNSPECIFIED GASTROINTESTINAL HEMORRHAGE TYPE: Primary | ICD-10-CM

## 2024-03-23 DIAGNOSIS — Z99.2 CHRONIC KIDNEY DISEASE WITH END STAGE RENAL FAILURE ON DIALYSIS (HCC): ICD-10-CM

## 2024-03-23 LAB
ALBUMIN SERPL-MCNC: 3.3 G/DL (ref 3.4–5)
ALBUMIN/GLOB SERPL: 1 {RATIO} (ref 1–2)
ALP LIVER SERPL-CCNC: 92 U/L
ALT SERPL-CCNC: 31 U/L
ANION GAP SERPL CALC-SCNC: 8 MMOL/L (ref 0–18)
ANTIBODY SCREEN: NEGATIVE
APTT PPP: 27 SECONDS (ref 23.3–35.6)
AST SERPL-CCNC: 14 U/L (ref 15–37)
ATRIAL RATE: 86 BPM
BASOPHILS # BLD AUTO: 0.06 X10(3) UL (ref 0–0.2)
BASOPHILS NFR BLD AUTO: 0.9 %
BILIRUB SERPL-MCNC: 0.4 MG/DL (ref 0.1–2)
BUN BLD-MCNC: 86 MG/DL (ref 9–23)
CALCIUM BLD-MCNC: 7 MG/DL (ref 8.5–10.1)
CHLORIDE SERPL-SCNC: 109 MMOL/L (ref 98–112)
CO2 SERPL-SCNC: 21 MMOL/L (ref 21–32)
CREAT BLD-MCNC: 7.86 MG/DL
EGFRCR SERPLBLD CKD-EPI 2021: 8 ML/MIN/1.73M2 (ref 60–?)
EOSINOPHIL # BLD AUTO: 0.21 X10(3) UL (ref 0–0.7)
EOSINOPHIL NFR BLD AUTO: 3.1 %
ERYTHROCYTE [DISTWIDTH] IN BLOOD BY AUTOMATED COUNT: 15.3 %
GLOBULIN PLAS-MCNC: 3.4 G/DL (ref 2.8–4.4)
GLUCOSE BLD-MCNC: 156 MG/DL (ref 70–99)
HCT VFR BLD AUTO: 27.2 %
HCT VFR BLD AUTO: 27.7 %
HGB BLD-MCNC: 9 G/DL
HGB BLD-MCNC: 9.1 G/DL
IMM GRANULOCYTES # BLD AUTO: 0.03 X10(3) UL (ref 0–1)
IMM GRANULOCYTES NFR BLD: 0.4 %
INR BLD: 1.03 (ref 0.8–1.2)
LYMPHOCYTES # BLD AUTO: 1.14 X10(3) UL (ref 1–4)
LYMPHOCYTES NFR BLD AUTO: 17.1 %
MCH RBC QN AUTO: 31.3 PG (ref 26–34)
MCHC RBC AUTO-ENTMCNC: 33.5 G/DL (ref 31–37)
MCV RBC AUTO: 93.5 FL
MONOCYTES # BLD AUTO: 0.7 X10(3) UL (ref 0.1–1)
MONOCYTES NFR BLD AUTO: 10.5 %
NEUTROPHILS # BLD AUTO: 4.53 X10 (3) UL (ref 1.5–7.7)
NEUTROPHILS # BLD AUTO: 4.53 X10(3) UL (ref 1.5–7.7)
NEUTROPHILS NFR BLD AUTO: 68 %
OSMOLALITY SERPL CALC.SUM OF ELEC: 315 MOSM/KG (ref 275–295)
P AXIS: 16 DEGREES
P-R INTERVAL: 194 MS
PLATELET # BLD AUTO: 223 10(3)UL (ref 150–450)
POTASSIUM SERPL-SCNC: 4.5 MMOL/L (ref 3.5–5.1)
PROT SERPL-MCNC: 6.7 G/DL (ref 6.4–8.2)
PROTHROMBIN TIME: 13.5 SECONDS (ref 11.6–14.8)
Q-T INTERVAL: 408 MS
QRS DURATION: 94 MS
QTC CALCULATION (BEZET): 488 MS
R AXIS: 7 DEGREES
RBC # BLD AUTO: 2.91 X10(6)UL
RH BLOOD TYPE: POSITIVE
SODIUM SERPL-SCNC: 138 MMOL/L (ref 136–145)
T AXIS: 118 DEGREES
VENTRICULAR RATE: 86 BPM
WBC # BLD AUTO: 6.7 X10(3) UL (ref 4–11)

## 2024-03-23 PROCEDURE — 74177 CT ABD & PELVIS W/CONTRAST: CPT | Performed by: STUDENT IN AN ORGANIZED HEALTH CARE EDUCATION/TRAINING PROGRAM

## 2024-03-23 PROCEDURE — 80053 COMPREHEN METABOLIC PANEL: CPT | Performed by: STUDENT IN AN ORGANIZED HEALTH CARE EDUCATION/TRAINING PROGRAM

## 2024-03-23 PROCEDURE — C9113 INJ PANTOPRAZOLE SODIUM, VIA: HCPCS | Performed by: STUDENT IN AN ORGANIZED HEALTH CARE EDUCATION/TRAINING PROGRAM

## 2024-03-23 PROCEDURE — 99285 EMERGENCY DEPT VISIT HI MDM: CPT

## 2024-03-23 PROCEDURE — 85018 HEMOGLOBIN: CPT | Performed by: INTERNAL MEDICINE

## 2024-03-23 PROCEDURE — 86901 BLOOD TYPING SEROLOGIC RH(D): CPT | Performed by: STUDENT IN AN ORGANIZED HEALTH CARE EDUCATION/TRAINING PROGRAM

## 2024-03-23 PROCEDURE — 93005 ELECTROCARDIOGRAM TRACING: CPT

## 2024-03-23 PROCEDURE — 85610 PROTHROMBIN TIME: CPT | Performed by: STUDENT IN AN ORGANIZED HEALTH CARE EDUCATION/TRAINING PROGRAM

## 2024-03-23 PROCEDURE — 86900 BLOOD TYPING SEROLOGIC ABO: CPT | Performed by: STUDENT IN AN ORGANIZED HEALTH CARE EDUCATION/TRAINING PROGRAM

## 2024-03-23 PROCEDURE — C9113 INJ PANTOPRAZOLE SODIUM, VIA: HCPCS | Performed by: INTERNAL MEDICINE

## 2024-03-23 PROCEDURE — 85014 HEMATOCRIT: CPT | Performed by: INTERNAL MEDICINE

## 2024-03-23 PROCEDURE — 85730 THROMBOPLASTIN TIME PARTIAL: CPT | Performed by: STUDENT IN AN ORGANIZED HEALTH CARE EDUCATION/TRAINING PROGRAM

## 2024-03-23 PROCEDURE — 86850 RBC ANTIBODY SCREEN: CPT | Performed by: STUDENT IN AN ORGANIZED HEALTH CARE EDUCATION/TRAINING PROGRAM

## 2024-03-23 PROCEDURE — 96374 THER/PROPH/DIAG INJ IV PUSH: CPT

## 2024-03-23 PROCEDURE — 96375 TX/PRO/DX INJ NEW DRUG ADDON: CPT

## 2024-03-23 PROCEDURE — 85025 COMPLETE CBC W/AUTO DIFF WBC: CPT | Performed by: STUDENT IN AN ORGANIZED HEALTH CARE EDUCATION/TRAINING PROGRAM

## 2024-03-23 PROCEDURE — 90935 HEMODIALYSIS ONE EVALUATION: CPT | Performed by: INTERNAL MEDICINE

## 2024-03-23 PROCEDURE — 93010 ELECTROCARDIOGRAM REPORT: CPT

## 2024-03-23 RX ORDER — ISOSORBIDE MONONITRATE 30 MG/1
30 TABLET, EXTENDED RELEASE ORAL DAILY
Status: DISCONTINUED | OUTPATIENT
Start: 2024-03-23 | End: 2024-03-23

## 2024-03-23 RX ORDER — FLUOXETINE HYDROCHLORIDE 20 MG/1
40 CAPSULE ORAL DAILY
Status: DISCONTINUED | OUTPATIENT
Start: 2024-03-24 | End: 2024-03-25

## 2024-03-23 RX ORDER — HYDROCODONE BITARTRATE AND ACETAMINOPHEN 5; 325 MG/1; MG/1
1 TABLET ORAL EVERY 4 HOURS PRN
Status: DISCONTINUED | OUTPATIENT
Start: 2024-03-23 | End: 2024-03-25

## 2024-03-23 RX ORDER — ALBUMIN (HUMAN) 12.5 G/50ML
25 SOLUTION INTRAVENOUS
Status: ACTIVE | OUTPATIENT
Start: 2024-03-23 | End: 2024-03-25

## 2024-03-23 RX ORDER — MELATONIN
3 NIGHTLY PRN
Status: DISCONTINUED | OUTPATIENT
Start: 2024-03-23 | End: 2024-03-25

## 2024-03-23 RX ORDER — NIFEDIPINE 60 MG/1
60 TABLET, EXTENDED RELEASE ORAL 2 TIMES DAILY
Status: DISCONTINUED | OUTPATIENT
Start: 2024-03-23 | End: 2024-03-25

## 2024-03-23 RX ORDER — ACETAMINOPHEN 325 MG/1
650 TABLET ORAL EVERY 4 HOURS PRN
Status: DISCONTINUED | OUTPATIENT
Start: 2024-03-23 | End: 2024-03-25

## 2024-03-23 RX ORDER — TAMSULOSIN HYDROCHLORIDE 0.4 MG/1
0.4 CAPSULE ORAL DAILY
Status: DISCONTINUED | OUTPATIENT
Start: 2024-03-24 | End: 2024-03-25

## 2024-03-23 RX ORDER — DEXTROAMPHETAMINE SACCHARATE, AMPHETAMINE ASPARTATE, DEXTROAMPHETAMINE SULFATE AND AMPHETAMINE SULFATE 2.5; 2.5; 2.5; 2.5 MG/1; MG/1; MG/1; MG/1
10 TABLET ORAL
Status: DISCONTINUED | OUTPATIENT
Start: 2024-03-24 | End: 2024-03-25

## 2024-03-23 RX ORDER — FENOFIBRATE 134 MG/1
134 CAPSULE ORAL
Status: DISCONTINUED | OUTPATIENT
Start: 2024-03-24 | End: 2024-03-25

## 2024-03-23 RX ORDER — ONDANSETRON 2 MG/ML
4 INJECTION INTRAMUSCULAR; INTRAVENOUS EVERY 6 HOURS PRN
Status: DISCONTINUED | OUTPATIENT
Start: 2024-03-23 | End: 2024-03-25

## 2024-03-23 RX ORDER — HYDRALAZINE HYDROCHLORIDE 50 MG/1
50 TABLET, FILM COATED ORAL 2 TIMES DAILY
Status: DISCONTINUED | OUTPATIENT
Start: 2024-03-23 | End: 2024-03-25

## 2024-03-23 RX ORDER — ISOSORBIDE MONONITRATE 30 MG/1
30 TABLET, EXTENDED RELEASE ORAL DAILY
Status: DISCONTINUED | OUTPATIENT
Start: 2024-03-23 | End: 2024-03-25

## 2024-03-23 RX ORDER — PROCHLORPERAZINE EDISYLATE 5 MG/ML
5 INJECTION INTRAMUSCULAR; INTRAVENOUS EVERY 8 HOURS PRN
Status: DISCONTINUED | OUTPATIENT
Start: 2024-03-23 | End: 2024-03-25

## 2024-03-23 RX ORDER — ACETAMINOPHEN 500 MG
500 TABLET ORAL EVERY 4 HOURS PRN
Status: DISCONTINUED | OUTPATIENT
Start: 2024-03-23 | End: 2024-03-25

## 2024-03-23 RX ORDER — SEVELAMER CARBONATE 800 MG/1
800 TABLET, FILM COATED ORAL
Status: DISCONTINUED | OUTPATIENT
Start: 2024-03-23 | End: 2024-03-25

## 2024-03-23 RX ORDER — LAMOTRIGINE 150 MG/1
150 TABLET ORAL DAILY
Status: DISCONTINUED | OUTPATIENT
Start: 2024-03-24 | End: 2024-03-25

## 2024-03-23 RX ORDER — BUPROPION HYDROCHLORIDE 150 MG/1
150 TABLET ORAL DAILY
Status: DISCONTINUED | OUTPATIENT
Start: 2024-03-24 | End: 2024-03-25

## 2024-03-23 RX ORDER — CARVEDILOL 12.5 MG/1
25 TABLET ORAL 2 TIMES DAILY WITH MEALS
Status: DISCONTINUED | OUTPATIENT
Start: 2024-03-23 | End: 2024-03-25

## 2024-03-23 RX ORDER — LOSARTAN POTASSIUM 100 MG/1
100 TABLET ORAL DAILY
Status: DISCONTINUED | OUTPATIENT
Start: 2024-03-24 | End: 2024-03-25

## 2024-03-23 RX ORDER — BENZONATATE 100 MG/1
100 CAPSULE ORAL 3 TIMES DAILY PRN
Status: DISCONTINUED | OUTPATIENT
Start: 2024-03-23 | End: 2024-03-25

## 2024-03-23 RX ORDER — HYDROCODONE BITARTRATE AND ACETAMINOPHEN 5; 325 MG/1; MG/1
2 TABLET ORAL EVERY 4 HOURS PRN
Status: DISCONTINUED | OUTPATIENT
Start: 2024-03-23 | End: 2024-03-25

## 2024-03-23 RX ORDER — CLONIDINE HYDROCHLORIDE 0.1 MG/1
0.1 TABLET ORAL 2 TIMES DAILY
Status: DISCONTINUED | OUTPATIENT
Start: 2024-03-23 | End: 2024-03-25

## 2024-03-23 RX ORDER — ONDANSETRON 2 MG/ML
4 INJECTION INTRAMUSCULAR; INTRAVENOUS ONCE
Status: COMPLETED | OUTPATIENT
Start: 2024-03-23 | End: 2024-03-23

## 2024-03-23 RX ORDER — SODIUM CHLORIDE 9 MG/ML
125 INJECTION, SOLUTION INTRAVENOUS CONTINUOUS
Status: DISCONTINUED | OUTPATIENT
Start: 2024-03-23 | End: 2024-03-23

## 2024-03-23 NOTE — ED PROVIDER NOTES
Patient Seen in: [facility] In [place]      History     Chief Complaint   Patient presents with    GI Bleeding     Stated Complaint: Abd pain, GI bleed. States all blood coming out, no stool. Symptoms started yes*    Subjective:   HPI    Patient is a 43-year-old male presenting to emergency department for evaluation of abdominal pain and GI bleeding.  He has had episodes of bleeding in the past but colonoscopy did not identify source.  Today he had 2 episodes of large amount of blood coming out of her rectum when he had the urge to defecate.  He did not pass any stool.  There was both numerous tiny clots as well as moderate amount of bright red blood present in the toilet.  Not feeling faint, no fever, no other complaints.  Patient does have history of chronic renal failure and had his last dialysis performed on Thursday.  No other complaints.    Objective:   Past Medical History:   Diagnosis Date    Asthma ([facility])     Attention deficit hyperactivity disorder (ADHD)     Back problem     Bipolar 1 disorder ([facility])     Cancer ([facility])     CKD (chronic kidney disease) stage 3, GFR 30-59 ml/min ([facility])     Dr Meeks    Congestive heart disease ([facility])     COPD (chronic obstructive pulmonary disease) ([facility])     Coronary atherosclerosis     Deep vein thrombosis ([facility])     at age 17 R/T cast    Depression     Diabetes ([facility])     Essential hypertension 2014    3/21 echo: severe concentric LVH with normal EF and no MR or pHTN    Extrinsic asthma, unspecified     Heart attack ([facility])     2016- angiogram- no intervention    Heart valve disease     mitral valve repair in 1994/    High blood pressure     High cholesterol     History of mitral valve repair 12/2022    Hyperlipidemia     Low back pain     tight and stiff after sweeping and mopping    LVH (left ventricular hypertrophy)     Migraines     Mixed hyperlipidemia 03/2021     HDL 38 LDL 97 VLDL 57     Monoclonal gammopathy     IgG kappa     MVP (mitral  valve prolapse)     Repair  at [place]; echoes as recently as 3/21 show mild or trivial MR and no stenosis    Neuropathy     Osteoarthritis     hip ,knees    Pneumonia due to organism     Pulmonary embolism (HCC) 2023    Renal disorder     TIA (transient ischemic attack) 2021    Initial history of left-sided weakness and slurred speech. (+) cocaine. MRI of the brain, CT angiogram of the head and neck, and 2D echo are all unremarkable.     TMJ (dislocation of temporomandibular joint)     Troponin level elevated 2021    Trop 60 60 47 with TIA and no CP: Lexiscan negative with EF 51              Past Surgical History:   Procedure Laterality Date    COLONOSCOPY N/A 2023    Procedure: COLONOSCOPY;  Surgeon: Heath Vu MD;  Location:  ENDOSCOPY    COLONOSCOPY N/A 2023    Procedure: COLONOSCOPY with cold snare polypectomy and forcep polypectomy;  Surgeon: Ousmane Suarez MD;  Location:  ENDOSCOPY    COLONOSCOPY & POLYPECTOMY  2019    EGD  2019    Duodenitis. Biopsied. EUS for weight loss was negative    HEART SURGERY      HERNIA SURGERY  2022    Dr Barnes    LAMINECTOMY,>2 SGMT,LUMBAR  2018    L4-L5 Decomp Discectomy ROEM L4-L5    MITRALPLASTY W CP BYPASS      [place]: Repair    REPAIR ROTATOR CUFF,CHRONIC Left     torn and had a ruptured bicep    VALVE REPAIR      mitral valve                Social History     Socioeconomic History    Marital status:    Tobacco Use    Smoking status: Former     Packs/day: 1.00     Years: 27.00     Additional pack years: 0.00     Total pack years: 27.00     Types: Cigarettes     Quit date: 2022     Years since quittin.0    Smokeless tobacco: Never   Vaping Use    Vaping Use: Never used   Substance and Sexual Activity    Alcohol use: No    Drug use: No     Social Determinants of Health     Food Insecurity: No Food Insecurity (3/16/2024)    Food Insecurity     Food Insecurity: Never true   Transportation Needs: No  Transportation Needs (3/16/2024)    Transportation Needs     Lack of Transportation: No   Housing Stability: Low Risk  (3/16/2024)    Housing Stability     Housing Instability: No              Review of Systems    Positive for stated complaint: Abd pain, GI bleed. States all blood coming out, no stool. Symptoms started yes*  Other systems are as noted in HPI.  Constitutional and vital signs reviewed.      All other systems reviewed and negative except as noted above.    Physical Exam     ED Triage Vitals [03/23/24 0649]   BP (!) 159/95   Pulse 94   Resp 18   Temp 97.7 °F (36.5 °C)   Temp src Oral   SpO2 99 %   O2 Device None (Room air)       Current:BP (!) 133/93   Pulse 93   Temp 97.7 °F (36.5 °C) (Oral)   Resp 22   Ht 188 cm (6' 2\")   Wt 108.9 kg   SpO2 98%   BMI 30.81 kg/m²         Physical Exam    Constitutional: No apparent distress  Eyes: No scleral icterus  Heart: regular rate rhythm, no murmurs  Lungs: Clear to auscultation bilaterally  Abdomen: Soft, no tenderness elicited.  No guarding, no rebound.  Skin: No rash  Neuro: Alert and oriented ×3  Rectal exam: Hemorrhoids are present, they are nontender, no active bleeding noted from the hemorrhoids.  Small amount of blood noted on the rectal exam.          ED Course/ My interpretations:     Labs Reviewed   COMP METABOLIC PANEL (14) - Abnormal; Notable for the following components:       Result Value    Glucose 156 (*)     BUN 86 (*)     Creatinine 7.86 (*)     Calcium, Total 7.0 (*)     Calculated Osmolality 315 (*)     eGFR-Cr 8 (*)     AST 14 (*)     Albumin 3.3 (*)     All other components within normal limits   CBC W/ DIFFERENTIAL - Abnormal; Notable for the following components:    RBC 2.91 (*)     HGB 9.1 (*)     HCT 27.2 (*)     All other components within normal limits   PROTHROMBIN TIME (PT) - Normal   PTT, ACTIVATED - Normal   CBC WITH DIFFERENTIAL WITH PLATELET    Narrative:     The following orders were created for panel order CBC With  Differential With Platelet.  Procedure                               Abnormality         Status                     ---------                               -----------         ------                     CBC W/ DIFFERENTIAL[[id number]]          Abnormal            Final result                 Please view results for these tests on the individual orders.   TYPE AND SCREEN    Narrative:     The following orders were created for panel order Type and screen.  Procedure                               Abnormality         Status                     ---------                               -----------         ------                     ABORH (Blood Type)[[id number]]                               Final result               Antibody Screen[[id number]]                                  Final result                 Please view results for these tests on the individual orders.   ABORH (BLOOD TYPE)   ANTIBODY SCREEN     EKG    Rate, intervals and axes as noted on EKG Report.  Rate: 86  Rhythm: Sinus Rhythm  Reading: Normal sinus rhythm, nonspecific T wave abnormality noted.  No significant change from prior EKG performed on March 8, 2024.           Patient's abdomen reveals no tenderness at this time.  All available radiology reports for this visit reviewed.      Medications given:  Orders Placed This Encounter    Comp Metabolic Panel (14)    CBC With Differential With Platelet    Prothrombin Time (PT)    PTT, Activated    ondansetron (Zofran) 4 MG/2ML injection 4 mg    DISCONTD: pantoprazole (Protonix) 80 mg in sodium chloride 0.9% PF 10 mL IV push    pantoprazole (Protonix) 40 mg in sodium chloride 0.9% PF 10 mL IV push                      MDM      Extensive differential was considered including diverticulitis, AVM, colonic mass, hemorrhoidal bleed, cholecystitis, appendicitis, colitis, gastroenteritis, bowel obstruction, and other gi, infectious, vascular, malignant, rheumatologic and other pathology.   Patient is known to have  extensive diverticuli as well as hemorrhoids.  Case discussed with the gastroenterologist on-call who is familiar with the patient.  He advised patient have a CT of abdomen pelvis and will see him in consultation.  Symptoms were addressed with iv fluids and iv medications.  Given severity of patient's symptoms and ED findings decision was made to admit the patient for further treatment and evaluation.  Patient remained hemodynamically stable throughout their emergency department period of observation with no adverse events or symproms reported by the patient.  The case was discussed in detail with the admitting physician who agreed with the emergency department management and disposition of the patient.     Admission disposition: 3/23/2024 10:58 AM           Disposition and Plan     Clinical Impression:  1. Gastrointestinal hemorrhage, unspecified gastrointestinal hemorrhage type    2. Chronic kidney disease with end stage renal failure on dialysis (HCC)         Disposition:  Admit  3/23/2024 10:58 am    Follow-up:  No follow-up provider specified.        Medications Prescribed:  Current Discharge Medication List                            Hospital Problems       Present on Admission  Date Reviewed: 1/3/2024            ICD-10-CM Noted POA    GI bleed K92.2 3/23/2024 Unknown           Documentation created with the aid of Dragon voice recognition software.  Although efforts were made to ensure the accuracy of the note, some inaccuracies may persist.

## 2024-03-23 NOTE — CONSULTS
[facility]   part of [facility]    Report of Consultation    Godwin Fonseca Patient Status:  Inpatient    1980 MRN [id number]   Location [facility] 5NW-A Attending Ria Gutierrez MD   Hosp Day # 0 PCP Prieto Novak MD       REASON FOR CONSULT:     ESRD    HISTORY OF PRESENT ILLNESS:     44 yo M with history of ESRD TTS via RIJ tunnelled CVC ([facility]-Dr. Singletary), hyperaldosteronism with history of hypertensive urgency, DM, bipolar disorder, asthma presented with abdominal pain and BRBPR. Nephrology consulted for ESRD management.     He states he has not eaten the past 1.5 days. Ate an omelet on the floor and so far, tolerating. He had 2 episodes of BRBPR last night and burning lower abdominal pain, similar to prior admissions. Last HD on Thursday per patient was without incident. He has no leg swelling or shortness of breath. He still makes urine 1-2 times per day.    REVIEW OF SYSTEMS:     Please see HPI for pertinent positives. 10 point review of systems otherwise reviewed and negative.     HISTORY:     Past Medical History:   Diagnosis Date    Asthma ([facility])     Attention deficit hyperactivity disorder (ADHD)     Back problem     Bipolar 1 disorder (HCC)     Cancer (HCC)     CKD (chronic kidney disease) stage 3, GFR 30-59 ml/min ([facility])     Dr Meeks    Congestive heart disease ([facility])     COPD (chronic obstructive pulmonary disease) ([facility])     Coronary atherosclerosis     Deep vein thrombosis ([facility])     at age 17 R/T cast    Depression     Diabetes (HCC)     Essential hypertension 2014    3/21 echo: severe concentric LVH with normal EF and no MR or pHTN    Extrinsic asthma, unspecified     Heart attack (HCC)     - angiogram- no intervention    Heart valve disease     mitral valve repair in /    High blood pressure     High cholesterol     History of mitral valve repair 2022    Hyperlipidemia     Low back pain     tight and stiff after sweeping and mopping    LVH (left  ventricular hypertrophy)     Migraines     Mixed hyperlipidemia 03/2021     HDL 38 LDL 97 VLDL 57     Monoclonal gammopathy     IgG kappa     MVP (mitral valve prolapse)     Repair 1994 at [place]; echoes as recently as 3/21 show mild or trivial MR and no stenosis    Neuropathy     Osteoarthritis     hip ,knees    Pneumonia due to organism     Pulmonary embolism (HCC) 01/2023    Renal disorder     TIA (transient ischemic attack) 03/2021    Initial history of left-sided weakness and slurred speech. (+) cocaine. MRI of the brain, CT angiogram of the head and neck, and 2D echo are all unremarkable.     TMJ (dislocation of temporomandibular joint)     Troponin level elevated 03/2021    Trop 60 60 47 with TIA and no CP: Lexiscan negative with EF 51     Past Surgical History:   Procedure Laterality Date    COLONOSCOPY N/A 03/26/2023    Procedure: COLONOSCOPY;  Surgeon: Heath Vu MD;  Location:  ENDOSCOPY    COLONOSCOPY N/A 12/30/2023    Procedure: COLONOSCOPY with cold snare polypectomy and forcep polypectomy;  Surgeon: Ousmane Suarez MD;  Location:  ENDOSCOPY    COLONOSCOPY & POLYPECTOMY  2019    EGD  2019    Duodenitis. Biopsied. EUS for weight loss was negative    HEART SURGERY      HERNIA SURGERY  08/17/2022    Dr Barnes    LAMINECTOMY,>2 SGMT,LUMBAR  09/06/2018    L4-L5 Decomp Discectomy ROEM L4-L5    MITRALPLASTY W CP BYPASS  1994    [place]: Repair    REPAIR ROTATOR CUFF,CHRONIC Left     torn and had a ruptured bicep    VALVE REPAIR  1994    mitral valve     Family History   Problem Relation Age of Onset    Hypertension Father     Alcohol and Other Disorders Associated Father     Substance Abuse Father         cocaine    Dementia Father     Cancer Father         lung    Diabetes Mother     Cancer Mother         multiple myeloma    Hypertension Mother     Anxiety Maternal Aunt     Depression Maternal Aunt     Anxiety Maternal Aunt     Depression Maternal Aunt     Bipolar Disorder Maternal Aunt      Diabetes Maternal Grandmother     Hypertension Maternal Grandmother     Cancer Maternal Grandfather         stomach cancer    Diabetes Maternal Grandfather     Hypertension Maternal Grandfather     Alcohol and Other Disorders Associated Maternal Grandfather     Hypertension Paternal Grandmother     Hypertension Paternal Grandfather     Cancer Sister         uterine and ovarian    Hypertension Sister     Cancer Maternal Uncle         lung    Cancer Paternal Aunt         throat      reports that he quit smoking about 2 years ago. His smoking use included cigarettes. He has a 27 pack-year smoking history. He has never used smokeless tobacco. He reports that he does not drink alcohol and does not use drugs.    ALLERGIES:     Allergies   Allergen Reactions    Hydrochlorothiazide RASH and HIVES       MEDICATIONS:       Current Facility-Administered Medications:     acetaminophen (Tylenol Extra Strength) tab 500 mg, 500 mg, Oral, Q4H PRN    acetaminophen (Tylenol) tab 650 mg, 650 mg, Oral, Q4H PRN **OR** HYDROcodone-acetaminophen (Norco) 5-325 MG per tab 1 tablet, 1 tablet, Oral, Q4H PRN **OR** HYDROcodone-acetaminophen (Norco) 5-325 MG per tab 2 tablet, 2 tablet, Oral, Q4H PRN    melatonin tab 3 mg, 3 mg, Oral, Nightly PRN    ondansetron (Zofran) 4 MG/2ML injection 4 mg, 4 mg, Intravenous, Q6H PRN    prochlorperazine (Compazine) 10 MG/2ML injection 5 mg, 5 mg, Intravenous, Q8H PRN    pantoprazole (Protonix) 40 mg in sodium chloride 0.9% PF 10 mL IV push, 40 mg, Intravenous, Q12H    sodium chloride 0.9 % IV bolus 100 mL, 100 mL, Intravenous, Q30 Min PRN **AND** albumin human (Albumin) 25% injection 25 g, 25 g, Intravenous, PRN Dialysis  No current outpatient medications on file.         PHYSICAL EXAM:     Vital Signs: /88 (BP Location: Right arm)   Pulse 91   Temp 97.8 °F (36.6 °C) (Oral)   Resp 18   Ht 188 cm (6' 2\")   Wt 229 lb 4.5 oz (104 kg)   SpO2 100%   BMI 29.44 kg/m²   Temp (24hrs), Av.8 °F  (36.6 °C), Min:97.7 °F (36.5 °C), Max:97.8 °F (36.6 °C)       Intake/Output Summary (Last 24 hours) at 3/23/2024 1443  Last data filed at 3/23/2024 1300  Gross per 24 hour   Intake 240 ml   Output --   Net 240 ml     Wt Readings from Last 3 Encounters:   03/23/24 229 lb 4.5 oz (104 kg)   03/16/24 245 lb (111.1 kg)   03/10/24 246 lb 14.6 oz (112 kg)       General: pleasant, well appearing  Skin: no visible rashes  HEENT: NCAT  Cardiac: Regular rate and rhythm, no murmur/gallop or rub  Lungs: CTAB, no wheeze, no rale, no rhonchi  Abdomen: Soft, NTND  Extremities: warm, well perfused, no leg edema  Neurologic/Psych: mentating well  RIJ tunnelled CVC without ttp    LABORATORY DATA:       Lab Results   Component Value Date     (H) 03/23/2024    BUN 86 (H) 03/23/2024    BUNCREA 13.0 03/07/2022    CREATSERUM 7.86 (H) 03/23/2024    ANIONGAP 8 03/23/2024    GFR 59 (L) 01/04/2018    GFRNAA 30 (L) 07/12/2022    GFRAA 35 (L) 07/12/2022    CA 7.0 (L) 03/23/2024    OSMOCALC 315 (H) 03/23/2024    ALKPHO 92 03/23/2024    AST 14 (L) 03/23/2024    ALT 31 03/23/2024    BILT 0.4 03/23/2024    TP 6.7 03/23/2024    ALB 3.3 (L) 03/23/2024    GLOBULIN 3.4 03/23/2024     03/23/2024    K 4.5 03/23/2024     03/23/2024    CO2 21.0 03/23/2024     Lab Results   Component Value Date    WBC 6.7 03/23/2024    RBC 2.91 (L) 03/23/2024    HGB 9.1 (L) 03/23/2024    HCT 27.2 (L) 03/23/2024    .0 03/23/2024    MPV 11.5 12/14/2012    MCV 93.5 03/23/2024    MCH 31.3 03/23/2024    MCHC 33.5 03/23/2024    RDW 15.3 03/23/2024    NEPRELIM 4.53 03/23/2024    NEPERCENT 68.0 03/23/2024    LYPERCENT 17.1 03/23/2024    MOPERCENT 10.5 03/23/2024    EOPERCENT 3.1 03/23/2024    BAPERCENT 0.9 03/23/2024    NE 4.53 03/23/2024    LYMABS 1.14 03/23/2024    MOABSO 0.70 03/23/2024    EOABSO 0.21 03/23/2024    BAABSO 0.06 03/23/2024     Lab Results   Component Value Date    MALBP 167.00 05/24/2023    CREUR 142.00 05/24/2023    CREUR 142.00  05/24/2023     Lab Results   Component Value Date    COLORUR Yellow 01/03/2024    CLARITY Clear 01/03/2024    SPECGRAVITY 1.025 01/03/2024    GLUUR Negative 01/03/2024    BILUR Negative 01/03/2024    KETUR Trace (A) 01/03/2024    BLOODURINE Negative 01/03/2024    PHURINE 5.5 01/03/2024    PROUR >=300 (A) 01/03/2024    UROBILINOGEN 0.2 01/03/2024    NITRITE Negative 01/03/2024    LEUUR Negative 01/03/2024    WBCUR 6-10 (A) 01/03/2024    RBCUR 0-2 01/03/2024    EPIUR Few (A) 01/03/2024    BACUR Rare (A) 01/03/2024    CAOXUR Occasional (A) 04/20/2018    HYLUR Present (A) 05/14/2019         IMAGING:     Reviewed.      ASSESSMENT/PLAN:       44 yo M with history of ESRD TTS via RIJ tunnelled CVC ([facility]-Dr. Singletary), hyperaldosteronism with history of hypertensive urgency, DM, bipolar disorder, asthma presented with abdominal pain and BRBPR. Nephrology consulted for ESRD management.     ESRD:  -- HD today per outpatient schedule  -- stop IV fluids    Anemia in ESRD:  -- also with GI bleeding  -- epo with HD    MBD:  -- check phos with AM labs    HTN:  -- resume home meds    GI bleeding:  -- work up pending; plan for CT noted    D/w RN. Will follow.    Thank you for allowing me to participate in the care of your patient. Please do not hesitate to contact me with concerns or questions.    Lenka Bond MD  [facility]  [address] [place] [id number]    3/23/2024  2:43 PM

## 2024-03-23 NOTE — H&P
[facility] HOSPITALIST  History and Physical     Godwin Fonseca Patient Status:  Inpatient    1980 MRN [id number]   Location [facility] 5NW-A Attending Ria Gutierrez MD   Hosp Day # 0 PCP Prieto Novak MD     Chief Complaint:   GI bleeding    History of Present Illness: Godwin Fonseca is a 43 year old male with PMH significant for ESRD on HD, bipolar, depression, DM 2, HTN, DL, CAD, COPD, HFpEF, chronic abdominal pain presents to the ED abdominal pain and GI bleeding.  Patient states he had a few episodes of blood in his stool, most recently at 3 AM this morning.  He describes it as large quantity, with clots.  He states it is bright red.  No nausea or vomiting.  No hematemesis or melena.  No fever or chills.  Patient has a history of diverticular bleeding, most recent colonoscopy was in 2023.  Hemoglobin in the ED is found to be 9.1.  Was given IV Protonix, GI was consulted and decision was made to admit for further care and management.    Past Medical History:  Past Medical History:   Diagnosis Date    Asthma (HCC)     Attention deficit hyperactivity disorder (ADHD)     Back problem     Bipolar 1 disorder (HCC)     Cancer (HCC)     CKD (chronic kidney disease) stage 3, GFR 30-59 ml/min (HCC)     Dr Meeks    Congestive heart disease (HCC)     COPD (chronic obstructive pulmonary disease) (HCC)     Coronary atherosclerosis     Deep vein thrombosis (HCC)     at age 17 R/T cast    Depression     Diabetes (HCC)     Essential hypertension 2014    3/21 echo: severe concentric LVH with normal EF and no MR or pHTN    Extrinsic asthma, unspecified     Heart attack (HCC)     2016- angiogram- no intervention    Heart valve disease     mitral valve repair in /    High blood pressure     High cholesterol     History of mitral valve repair 2022    Hyperlipidemia     Low back pain     tight and stiff after sweeping and mopping    LVH (left ventricular hypertrophy)     Migraines     Mixed hyperlipidemia  03/2021     HDL 38 LDL 97 VLDL 57     Monoclonal gammopathy     IgG kappa     MVP (mitral valve prolapse)     Repair 1994 at [place]; echoes as recently as 3/21 show mild or trivial MR and no stenosis    Neuropathy     Osteoarthritis     hip ,knees    Pneumonia due to organism     Pulmonary embolism (HCC) 01/2023    Renal disorder     TIA (transient ischemic attack) 03/2021    Initial history of left-sided weakness and slurred speech. (+) cocaine. MRI of the brain, CT angiogram of the head and neck, and 2D echo are all unremarkable.     TMJ (dislocation of temporomandibular joint)     Troponin level elevated 03/2021    Trop 60 60 47 with TIA and no CP: Lexiscan negative with EF 51        Past Surgical History:   Past Surgical History:   Procedure Laterality Date    COLONOSCOPY N/A 03/26/2023    Procedure: COLONOSCOPY;  Surgeon: Heath Vu MD;  Location:  ENDOSCOPY    COLONOSCOPY N/A 12/30/2023    Procedure: COLONOSCOPY with cold snare polypectomy and forcep polypectomy;  Surgeon: Ousmane Suarez MD;  Location:  ENDOSCOPY    COLONOSCOPY & POLYPECTOMY  2019    EGD  2019    Duodenitis. Biopsied. EUS for weight loss was negative    HEART SURGERY      HERNIA SURGERY  08/17/2022    Dr Barnes    LAMINECTOMY,>2 SGMT,LUMBAR  09/06/2018    L4-L5 Decomp Discectomy ROEM L4-L5    MITRALPLASTY W CP BYPASS  1994    [place]: Repair    REPAIR ROTATOR CUFF,CHRONIC Left     torn and had a ruptured bicep    VALVE REPAIR  1994    mitral valve       Social History:  reports that he quit smoking about 2 years ago. His smoking use included cigarettes. He has a 27 pack-year smoking history. He has never used smokeless tobacco. He reports that he does not drink alcohol and does not use drugs.    Family History:   Family History   Problem Relation Age of Onset    Hypertension Father     Alcohol and Other Disorders Associated Father     Substance Abuse Father         cocaine    Dementia Father     Cancer Father         lung     Diabetes Mother     Cancer Mother         multiple myeloma    Hypertension Mother     Anxiety Maternal Aunt     Depression Maternal Aunt     Anxiety Maternal Aunt     Depression Maternal Aunt     Bipolar Disorder Maternal Aunt     Diabetes Maternal Grandmother     Hypertension Maternal Grandmother     Cancer Maternal Grandfather         stomach cancer    Diabetes Maternal Grandfather     Hypertension Maternal Grandfather     Alcohol and Other Disorders Associated Maternal Grandfather     Hypertension Paternal Grandmother     Hypertension Paternal Grandfather     Cancer Sister         uterine and ovarian    Hypertension Sister     Cancer Maternal Uncle         lung    Cancer Paternal Aunt         throat        Allergies:   Allergies   Allergen Reactions    Hydrochlorothiazide RASH and HIVES       Medications:    Current Facility-Administered Medications on File Prior to Encounter   Medication Dose Route Frequency Provider Last Rate Last Admin    [COMPLETED] hydrALAzine (Apresoline) 20 mg/mL injection 5 mg  5 mg Intravenous Once Gama Ray, DO   5 mg at 03/16/24 0109    [COMPLETED] cloNIDine (Catapres) tab 0.1 mg  0.1 mg Oral Once Gama Ray, DO   0.1 mg at 03/16/24 0209    [COMPLETED] HYDROmorphone (Dilaudid) 1 MG/ML injection 0.5 mg  0.5 mg Intravenous Once Gama Ray, DO   0.5 mg at 03/16/24 0236    [COMPLETED] hydrALAzine (Apresoline) 20 mg/mL injection 10 mg  10 mg Intravenous Once Gama Ray, DO   10 mg at 03/16/24 0316    [COMPLETED] HYDROmorphone (Dilaudid) 1 MG/ML injection 1 mg  1 mg Intravenous Once Gama Ray, DO   1 mg at 03/16/24 0348    [COMPLETED] HYDROmorphone (Dilaudid) 1 MG/ML injection 1 mg  1 mg Intravenous Once Terry Lew MD   1 mg at 03/08/24 1320    [COMPLETED] labetalol (Trandate) 5 mg/mL injection 20 mg  20 mg Intravenous Once Terry Lew MD   20 mg at 03/08/24 1320    [COMPLETED] hydrALAzine (Apresoline) 20 mg/mL injection 20 mg  20 mg Intravenous Once Terry Lew MD    20 mg at 24 1417    [COMPLETED] labetalol (Trandate) 5 mg/mL injection 40 mg  40 mg Intravenous Once Terry Lew MD   40 mg at 24 1449    [COMPLETED] heparin (Porcine) 1000 UNIT/ML injection 1,500 Units  1.5 mL Intracatheter Once Samaria Nava MD   1,500 Units at 24 2100    [COMPLETED] labetalol (Trandate) 5 mg/mL injection 10 mg  10 mg Intravenous Once Jhonny Rebolledo MD   10 mg at 24 0357    [COMPLETED] butalbital-acetaminophen-caffeine (Fioricet) -40 MG per tab 1 tablet  1 tablet Oral Once Jhonny Rebolledo MD   1 tablet at 24 0245    [COMPLETED] acetaminophen (Tylenol Extra Strength) 500 MG tab        Given at 24 0300    [] sodium chloride 0.9 % IV bolus 100 mL  100 mL Intravenous Q30 Min PRN Toni, Ali, DO        And    [] albumin human (Albumin) 25% injection 25 g  25 g Intravenous PRN Dialysis Toni, Ali, DO        [COMPLETED] heparin (Porcine) 1000 UNIT/ML injection 2,000 Units  2,000 Units Intravenous Once Toni, Ali, DO   2,000 Units at 24 1820    [COMPLETED] hydrALAzine (Apresoline) 20 mg/mL injection 10 mg  10 mg Intravenous Once Jhonny Rebolledo MD   10 mg at 24 2225    [COMPLETED] sodium chloride 0.9 % IV bolus 500 mL  500 mL Intravenous Once Davis Bob MD   Stopped at 24 2137    [COMPLETED] acetaminophen (Tylenol Extra Strength) tab 1,000 mg  1,000 mg Oral Once Davis Bob MD   1,000 mg at 24 2300    [COMPLETED] morphINE PF 4 MG/ML injection 2 mg  2 mg Intravenous Once Davis Bob MD   2 mg at 24 2300    [COMPLETED] polyethylene glycol-electrolyte (Golytely) 236 g oral solution 4,000 mL  4,000 mL Oral Once Ousmane Suarez MD   4,000 mL at 23 1220    [COMPLETED] morphINE PF 4 MG/ML injection 4 mg  4 mg Intravenous Once Davis Bob MD   4 mg at 23 0861    [COMPLETED] cloNIDine (Catapres) tab 0.1 mg  0.1 mg Oral Once Davis Bob MD   0.1 mg at 23 5896     [COMPLETED] labetalol (Trandate) 5 mg/mL injection 20 mg  20 mg Intravenous Once Davis Bob MD   20 mg at 12/28/23 1039    [COMPLETED] polyethylene glycol-electrolyte (Golytely) 236 g oral solution 4,000 mL  4,000 mL Oral Once Ousmane Suarez MD   2,000 mL at 12/29/23 0534     Current Outpatient Medications on File Prior to Encounter   Medication Sig Dispense Refill    isosorbide mononitrate ER 30 MG Oral Tablet 24 Hr Take 1 tablet (30 mg total) by mouth daily. Hold if systolic blood pressure <130      benzonatate 100 MG Oral Cap Take 1 capsule (100 mg total) by mouth 3 (three) times daily as needed for cough. 30 capsule 0    tamsulosin 0.4 MG Oral Cap Take 1 capsule (0.4 mg total) by mouth daily.      Lisdexamfetamine Dimesylate 60 MG Oral Cap Take 1 capsule (60 mg total) by mouth every morning.      buPROPion  MG Oral Tablet 24 Hr Take 1 tablet (150 mg total) by mouth daily.      ARIPiprazole 15 MG Oral Tab Take 1 tablet (15 mg total) by mouth daily.      bisacodyl 5 MG Oral Tab EC Take 3 tablets (15 mg total) by mouth daily. 30 tablet 0    polyethylene glycol, PEG 3350, 17 g Oral Powd Pack Take 17 g by mouth in the morning and 17 g before bedtime. 60 packet 0    lamoTRIgine (LAMICTAL) 150 MG Oral Tab Take 1 tablet (150 mg total) by mouth daily. 7 tablet 0    FLUoxetine HCl 40 MG Oral Cap Take 1 capsule (40 mg total) by mouth daily. 7 capsule 0    Hyoscyamine Sulfate 0.125 MG Oral Tab Take 1 tablet (0.125 mg total) by mouth every 6 (six) hours as needed for Cramping.      potassium chloride 20 MEQ Oral Tab CR Take 1 tablet (20 mEq total) by mouth daily.      RENVELA 800 MG Oral Tab Take 1 tablet (800 mg total) by mouth 3 (three) times daily with meals.      losartan 100 MG Oral Tab Take 1 tablet (100 mg total) by mouth daily. Hold if systolic blood pressure <130      furosemide 80 MG Oral Tab Take 1 tablet (80 mg total) by mouth 2 (two) times daily. Hold if systolic blood pressure <130       hydrALAZINE 50 MG Oral Tab Take 1 tablet (50 mg total) by mouth in the morning and 1 tablet (50 mg total) before bedtime. Hold if systolic blood pressure <130. 30 tablet 0    NIFEdipine ER 60 MG Oral Tablet 24 Hr Take 1 tablet (60 mg total) by mouth in the morning and 1 tablet (60 mg total) before bedtime. Hold if systolic blood pressure <130.      cloNIDine 0.1 MG Oral Tab Take 1 tablet (0.1 mg total) by mouth 2 (two) times daily. 60 tablet 0    ergocalciferol 1.25 MG (23640 UT) Oral Cap Take 1 capsule (50,000 Units total) by mouth Every Monday.      carvedilol 25 MG Oral Tab Take 1 tablet (25 mg total) by mouth in the morning and 1 tablet (25 mg total) in the evening. Take with meals. 60 tablet 6    Tiotropium Bromide Monohydrate (SPIRIVA HANDIHALER) 18 MCG Inhalation Cap 1 capsule (18 mcg total) daily.      albuterol 108 (90 Base) MCG/ACT Inhalation Aero Soln albuterol sulfate HFA 90 mcg/actuation aerosol inhaler   INHALE 1 TO 2 PUFFS BY MOUTH EVERY 4-6 HOURS AS NEEDED FOR COUGH OR WHEEZING      Fenofibrate 134 MG Oral Cap Take 1 capsule by mouth nightly. 90 capsule 1    TRULICITY 0.75 MG/0.5ML Subcutaneous Solution Pen-injector once a week. EVERY MONDAY      acetaminophen 500 MG Oral Tab Take 1 tablet (500 mg total) by mouth every 6 (six) hours as needed for Pain.      Fluticasone Propionate 50 MCG/ACT Nasal Suspension SPRAY ONCE INTO EACH NOSTRIL BID PRN 15.8 mL 0       Review of Systems:   A comprehensive 14 point review of systems was completed.    Pertinent positives and negatives noted in the HPI.    Physical Exam:    BP (!) 124/91   Pulse 91   Temp 97.7 °F (36.5 °C) (Oral)   Resp 20   Ht 6' 2\" (1.88 m)   Wt 240 lb (108.9 kg)   SpO2 99%   BMI 30.81 kg/m²   General: No acute distress. Alert and oriented x 3.  HEENT: Normocephalic atraumatic. Moist mucous membranes. EOM-I. PERRLA. Anicteric.  Neck: No lymphadenopathy. No JVD. No carotid bruits.  Respiratory: Clear to auscultation bilaterally. No  wheezes. No rhonchi.  Cardiovascular: S1, S2. Regular rate and rhythm. No murmurs, rubs or gallops. Equal pulses.   Chest and Back: No tenderness or deformity.  Abdomen: ttp in epig and lower abd regions  Neurologic: No focal neurological deficits. CNII-XII grossly intact.  Musculoskeletal: Moves all extremities.  Extremities: No edema or cyanosis.  Integument: No rashes or lesions.   Psychiatric: Appropriate mood and affect.      Diagnostic Data:      Labs:  Recent Labs   Lab 03/17/24  0505 03/23/24  0725   WBC 7.0 6.7   HGB 10.8* 9.1*   MCV 88.8 93.5   .0 223.0   INR  --  1.03       Recent Labs   Lab 03/17/24  0505 03/23/24  0725   * 156*   BUN 35* 86*   CREATSERUM 4.62* 7.86*   CA 8.6 7.0*   ALB 3.5 3.3*    138   K 3.8 4.5    109   CO2 23.0 21.0   ALKPHO  --  92   AST  --  14*   ALT  --  31   BILT  --  0.4   TP  --  6.7       Estimated Creatinine Clearance: 13.8 mL/min (A) (based on SCr of 7.86 mg/dL (H)).    Recent Labs   Lab 03/23/24  0725   PTP 13.5   INR 1.03       COVID-19 Lab Results    COVID-19  Lab Results   Component Value Date    COVID19 Not Detected 03/16/2024    COVID19 Not Detected 03/09/2024    COVID19 Not Detected 03/08/2024       Pro-Calcitonin  No results for input(s): \"PCT\" in the last 168 hours.    Cardiac  No results for input(s): \"TROP\", \"PBNP\" in the last 168 hours.    Creatinine Kinase  No results for input(s): \"CK\" in the last 168 hours.    Inflammatory Markers  No results for input(s): \"CRP\", \"HARJEET\", \"LDH\", \"DDIMER\" in the last 168 hours.    No results for input(s): \"TROP\", \"TROPHS\", \"CK\" in the last 168 hours.    Imaging: Imaging data reviewed in Epic.      ASSESSMENT / PLAN:   Godwin Fonseca is a 43 year old male with PMH significant for ESRD on HD, bipolar, depression, DM 2, HTN, DL, CAD, COPD, HFpEF, chronic abdominal pain presents to the ED abdominal pain and GI bleeding.    BRBPR/GI bleeding  Chronic abd pain  -most likely diverticular  -monitor hgb >> 9.1 on  admission, repeat H&H pending  -IV PPI BID  -recheck labs in the morning  -GI consulted >> dw Dr Vu  -CT a/p pending  -possible scope on Monday  -pain meds prn  -supportive care    HTN  DL  CAD  HFpEF  -resume home meds    DM2  -hold po meds  -ISS +accuchecks    ESRD on HD  -HD T/TH/Sa  -nephrology consulted    COPD  -no evidence of acute exacerbation  -resume home inhalers    MDD  Bipolar   -resume home meds    Chronic pain  -Follows with pain clinic outpatient  -Resume home pain medication.      Quality:  DVT Prophylaxis: scds, dvt ppx on hold due to gi bleeding  CODE status: full code  Abbott: no  If COVID testing is negative, may discontinue isolation: yes     Plan of care discussed with patient and all questions answered.        Ria Gutierrez MD  [facility] Hospitalist  Pager [phone]  Answering Service number: [phone]          **Certification      PHYSICIAN Certification of Need for Inpatient Hospitalization - Initial Certification    Patient will require inpatient services that will reasonably be expected to span two midnight's based on the clinical documentation in H+P.   Based on patients current state of illness, I anticipate that, after discharge, patient will require TBD.

## 2024-03-23 NOTE — ED INITIAL ASSESSMENT (HPI)
Dialysis pt here for lower abd pain and 2 episodes of bloody stool since 1am. +Nausea, but no vomiting.

## 2024-03-23 NOTE — PLAN OF CARE
NURSING ADMISSION NOTE      Patient admitted via Cart.  Oriented to room.  Safety precautions initiated.  Bed in low position. Call light within reach.   Patient return demonstrated use of call light.  Admission navigator completed with the patient.    Patient is A&Ox4. Vitals signs WNL. ST on tele. Reports 2+ episodes of GI bleeding, occasional lightheadedness, and burning pain in the abdomen when he feels \"full\" and has the urge to make a BM. No BM so far. Denies any SOB, lightheadedness or weakness. Per patient, he has lost 60 lbs in 4 months, not been eating in the last few months. Dietician consulted. GI and renal consults to see the patient. Ambulatory, fall precautions in place. Patient updated on the plan of care. Questions and concerns addressed. Will continue to monitor.     Problem: CARDIOVASCULAR - ADULT  Goal: Maintains optimal cardiac output and hemodynamic stability  Description: INTERVENTIONS:  - Monitor vital signs, rhythm, and trends  - Monitor for bleeding, hypotension and signs of decreased cardiac output  - Evaluate effectiveness of vasoactive medications to optimize hemodynamic stability  - Monitor arterial and/or venous puncture sites for bleeding and/or hematoma  - Assess quality of pulses, skin color and temperature  - Assess for signs of decreased coronary artery perfusion - ex. Angina  - Evaluate fluid balance, assess for edema, trend weights  Outcome: Progressing  Goal: Absence of cardiac arrhythmias or at baseline  Description: INTERVENTIONS:  - Continuous cardiac monitoring, monitor vital signs, obtain 12 lead EKG if indicated  - Evaluate effectiveness of antiarrhythmic and heart rate control medications as ordered  - Initiate emergency measures for life threatening arrhythmias  - Monitor electrolytes and administer replacement therapy as ordered  Outcome: Progressing     Problem: GASTROINTESTINAL - ADULT  Goal: Maintains adequate nutritional intake (undernourished)  Description:  INTERVENTIONS:  - Monitor percentage of each meal consumed  - Identify factors contributing to decreased intake, treat as appropriate  - Assist with meals as needed  - Monitor I&O, WT and lab values  - Obtain nutritional consult as needed  - Optimize oral hygiene and moisture  - Encourage food from home; allow for food preferences  - Enhance eating environment  Outcome: Progressing     Problem: METABOLIC/FLUID AND ELECTROLYTES - ADULT  Goal: Electrolytes maintained within normal limits  Description: INTERVENTIONS:  - Monitor labs and rhythm and assess patient for signs and symptoms of electrolyte imbalances  - Administer electrolyte replacement as ordered  - Monitor response to electrolyte replacements, including rhythm and repeat lab results as appropriate  - Fluid restriction as ordered  - Instruct patient on fluid and nutrition restrictions as appropriate  Outcome: Progressing  Goal: Hemodynamic stability and optimal renal function maintained  Description: INTERVENTIONS:  - Monitor labs and assess for signs and symptoms of volume excess or deficit  - Monitor intake, output and patient weight  - Monitor urine specific gravity, serum osmolarity and serum sodium as indicated or ordered  - Monitor response to interventions for patient's volume status, including labs, urine output, blood pressure (other measures as available)  - Encourage oral intake as appropriate  - Instruct patient on fluid and nutrition restrictions as appropriate  Outcome: Progressing     Problem: HEMATOLOGIC - ADULT  Goal: Maintains hematologic stability  Description: INTERVENTIONS  - Assess for signs and symptoms of bleeding or hemorrhage  - Monitor labs and vital signs for trends  - Administer supportive blood products/factors, fluids and medications as ordered and appropriate  - Administer supportive blood products/factors as ordered and appropriate  Outcome: Progressing  Goal: Free from bleeding injury  Description: (Example usage: patient  with low platelets)  INTERVENTIONS:  - Avoid intramuscular injections, enemas and rectal medication administration  - Ensure safe mobilization of patient  - Hold pressure on venipuncture sites to achieve adequate hemostasis  - Assess for signs and symptoms of internal bleeding  - Monitor lab trends  - Patient is to report abnormal signs of bleeding to staff  - Avoid use of toothpicks and dental floss  - Use electric shaver for shaving  - Use soft bristle tooth brush  - Limit straining and forceful nose blowing  Outcome: Progressing     Problem: Patient/Family Goals  Goal: Patient/Family Long Term Goal  Description: Patient's Long Term Goal: to discharge with adequate resources    Interventions:  - follow plan of care  - collaborate with healthcare team  - See additional Care Plan goals for specific interventions  Outcome: Progressing  Goal: Patient/Family Short Term Goal  Description: Patient's Short Term Goal:   3/23 am: decreased bleeding    Interventions:   - GI consult  - renal consult  - See additional Care Plan goals for specific interventions  Outcome: Progressing

## 2024-03-24 DIAGNOSIS — K57.92 DIVERTICULITIS: Primary | ICD-10-CM

## 2024-03-24 LAB
ALBUMIN SERPL-MCNC: 3.2 G/DL (ref 3.4–5)
ANION GAP SERPL CALC-SCNC: 4 MMOL/L (ref 0–18)
BASOPHILS # BLD AUTO: 0.07 X10(3) UL (ref 0–0.2)
BASOPHILS NFR BLD AUTO: 1.4 %
BUN BLD-MCNC: 37 MG/DL (ref 9–23)
CALCIUM BLD-MCNC: 8.1 MG/DL (ref 8.5–10.1)
CHLORIDE SERPL-SCNC: 109 MMOL/L (ref 98–112)
CO2 SERPL-SCNC: 26 MMOL/L (ref 21–32)
CREAT BLD-MCNC: 4.67 MG/DL
EGFRCR SERPLBLD CKD-EPI 2021: 15 ML/MIN/1.73M2 (ref 60–?)
EOSINOPHIL # BLD AUTO: 0.2 X10(3) UL (ref 0–0.7)
EOSINOPHIL NFR BLD AUTO: 4 %
ERYTHROCYTE [DISTWIDTH] IN BLOOD BY AUTOMATED COUNT: 14.8 %
GLUCOSE BLD-MCNC: 101 MG/DL (ref 70–99)
HCT VFR BLD AUTO: 27.3 %
HGB BLD-MCNC: 9.4 G/DL
IMM GRANULOCYTES # BLD AUTO: 0.02 X10(3) UL (ref 0–1)
IMM GRANULOCYTES NFR BLD: 0.4 %
LYMPHOCYTES # BLD AUTO: 1.29 X10(3) UL (ref 1–4)
LYMPHOCYTES NFR BLD AUTO: 26 %
MCH RBC QN AUTO: 31.2 PG (ref 26–34)
MCHC RBC AUTO-ENTMCNC: 34.4 G/DL (ref 31–37)
MCV RBC AUTO: 90.7 FL
MONOCYTES # BLD AUTO: 0.44 X10(3) UL (ref 0.1–1)
MONOCYTES NFR BLD AUTO: 8.9 %
NEUTROPHILS # BLD AUTO: 2.94 X10 (3) UL (ref 1.5–7.7)
NEUTROPHILS # BLD AUTO: 2.94 X10(3) UL (ref 1.5–7.7)
NEUTROPHILS NFR BLD AUTO: 59.3 %
OSMOLALITY SERPL CALC.SUM OF ELEC: 297 MOSM/KG (ref 275–295)
PHOSPHATE SERPL-MCNC: 3.8 MG/DL (ref 2.5–4.9)
PLATELET # BLD AUTO: 203 10(3)UL (ref 150–450)
POTASSIUM SERPL-SCNC: 4 MMOL/L (ref 3.5–5.1)
RBC # BLD AUTO: 3.01 X10(6)UL
SODIUM SERPL-SCNC: 139 MMOL/L (ref 136–145)
WBC # BLD AUTO: 5 X10(3) UL (ref 4–11)

## 2024-03-24 PROCEDURE — 85025 COMPLETE CBC W/AUTO DIFF WBC: CPT | Performed by: INTERNAL MEDICINE

## 2024-03-24 PROCEDURE — C9113 INJ PANTOPRAZOLE SODIUM, VIA: HCPCS | Performed by: INTERNAL MEDICINE

## 2024-03-24 PROCEDURE — 80069 RENAL FUNCTION PANEL: CPT | Performed by: INTERNAL MEDICINE

## 2024-03-24 NOTE — PLAN OF CARE
Patient is A&Ox4. Vitals signs WNL. NSR on tele this shift. No evidence of GI bleeding today, no BM since yesterday morning. Remains to have lightheadedness and mild burning pain in the abdomen. See MAR for pain management. Has chronic neck/back pain. Good PO intake, appetite improving. 2L fluid restriction, strict intake and output. Advanced to soft diet today per GI. Plans for NPO after midnight and flex sig tomorrow. Dietician consulted to see the patient for supplement recs.  Ambulatory, fall precautions in place. Patient updated on the plan of care. Questions and concerns addressed. Will continue to monitor.     Problem: CARDIOVASCULAR - ADULT  Goal: Maintains optimal cardiac output and hemodynamic stability  Description: INTERVENTIONS:  - Monitor vital signs, rhythm, and trends  - Monitor for bleeding, hypotension and signs of decreased cardiac output  - Evaluate effectiveness of vasoactive medications to optimize hemodynamic stability  - Monitor arterial and/or venous puncture sites for bleeding and/or hematoma  - Assess quality of pulses, skin color and temperature  - Assess for signs of decreased coronary artery perfusion - ex. Angina  - Evaluate fluid balance, assess for edema, trend weights  Outcome: Progressing  Goal: Absence of cardiac arrhythmias or at baseline  Description: INTERVENTIONS:  - Continuous cardiac monitoring, monitor vital signs, obtain 12 lead EKG if indicated  - Evaluate effectiveness of antiarrhythmic and heart rate control medications as ordered  - Initiate emergency measures for life threatening arrhythmias  - Monitor electrolytes and administer replacement therapy as ordered  Outcome: Progressing     Problem: GASTROINTESTINAL - ADULT  Goal: Maintains adequate nutritional intake (undernourished)  Description: INTERVENTIONS:  - Monitor percentage of each meal consumed  - Identify factors contributing to decreased intake, treat as appropriate  - Assist with meals as needed  - Monitor  I&O, WT and lab values  - Obtain nutritional consult as needed  - Optimize oral hygiene and moisture  - Encourage food from home; allow for food preferences  - Enhance eating environment  Outcome: Progressing     Problem: METABOLIC/FLUID AND ELECTROLYTES - ADULT  Goal: Electrolytes maintained within normal limits  Description: INTERVENTIONS:  - Monitor labs and rhythm and assess patient for signs and symptoms of electrolyte imbalances  - Administer electrolyte replacement as ordered  - Monitor response to electrolyte replacements, including rhythm and repeat lab results as appropriate  - Fluid restriction as ordered  - Instruct patient on fluid and nutrition restrictions as appropriate  Outcome: Progressing  Goal: Hemodynamic stability and optimal renal function maintained  Description: INTERVENTIONS:  - Monitor labs and assess for signs and symptoms of volume excess or deficit  - Monitor intake, output and patient weight  - Monitor urine specific gravity, serum osmolarity and serum sodium as indicated or ordered  - Monitor response to interventions for patient's volume status, including labs, urine output, blood pressure (other measures as available)  - Encourage oral intake as appropriate  - Instruct patient on fluid and nutrition restrictions as appropriate  Outcome: Progressing     Problem: HEMATOLOGIC - ADULT  Goal: Maintains hematologic stability  Description: INTERVENTIONS  - Assess for signs and symptoms of bleeding or hemorrhage  - Monitor labs and vital signs for trends  - Administer supportive blood products/factors, fluids and medications as ordered and appropriate  - Administer supportive blood products/factors as ordered and appropriate  Outcome: Progressing  Goal: Free from bleeding injury  Description: (Example usage: patient with low platelets)  INTERVENTIONS:  - Avoid intramuscular injections, enemas and rectal medication administration  - Ensure safe mobilization of patient  - Hold pressure on  venipuncture sites to achieve adequate hemostasis  - Assess for signs and symptoms of internal bleeding  - Monitor lab trends  - Patient is to report abnormal signs of bleeding to staff  - Avoid use of toothpicks and dental floss  - Use electric shaver for shaving  - Use soft bristle tooth brush  - Limit straining and forceful nose blowing  Outcome: Progressing     Problem: Patient/Family Goals  Goal: Patient/Family Long Term Goal  Description: Patient's Long Term Goal: to discharge with adequate resources    Interventions:  - follow plan of care  - collaborate with healthcare team  - See additional Care Plan goals for specific interventions  Outcome: Progressing  Goal: Patient/Family Short Term Goal  Description: Patient's Short Term Goal:   3/23 am: decreased bleeding  03/23/2024 - control abdominal pain  3/24 am: resolve bleeding    Interventions:   - GI consult  - renal consult  - norco as ordered  - See additional Care Plan goals for specific interventions  Outcome: Progressing

## 2024-03-24 NOTE — PROGRESS NOTES
[facility]   part of [facility]    Nephrology Progress Note    Godwin Fonseca Attending:  Ria Gutierrez MD       SUBJECTIVE:     Underwent HD yesterday without issue -denies cramping during treatment.    PHYSICAL EXAM:     Vital Signs: /85 (BP Location: Left arm)   Pulse 89   Temp 98.2 °F (36.8 °C) (Oral)   Resp 16   Ht 188 cm (6' 2\")   Wt 229 lb 4.5 oz (104 kg)   SpO2 99%   BMI 29.44 kg/m²   Temp (24hrs), Av °F (36.7 °C), Min:97.6 °F (36.4 °C), Max:98.3 °F (36.8 °C)       Intake/Output Summary (Last 24 hours) at 3/24/2024 1059  Last data filed at 3/23/2024 1300  Gross per 24 hour   Intake 615 ml   Output --   Net 615 ml     Wt Readings from Last 3 Encounters:   24 229 lb 4.5 oz (104 kg)   24 245 lb (111.1 kg)   03/10/24 246 lb 14.6 oz (112 kg)       General: pleasant, well appearing  Skin: no visible rashes  HEENT: NCAT  Cardiac: Regular rate and rhythm, no murmur/gallop or rub  Lungs: CTAB, no wheeze, no rale, no rhonchi  Abdomen: Soft, NTND  Extremities: warm, well perfused, no leg edema  Neurologic/Psych: mentating well  RIJ tunnelled CVC without ttp    LABORATORY DATA:     Lab Results   Component Value Date     (H) 2024    BUN 37 (H) 2024    BUNCREA 13.0 2022    CREATSERUM 4.67 (H) 2024    ANIONGAP 4 2024    GFR 59 (L) 2018    GFRNAA 30 (L) 2022    GFRAA 35 (L) 2022    CA 8.1 (L) 2024    OSMOCALC 297 (H) 2024    ALKPHO 92 2024    AST 14 (L) 2024    ALT 31 2024    BILT 0.4 2024    TP 6.7 2024    ALB 3.2 (L) 2024    GLOBULIN 3.4 2024     2024    K 4.0 2024     2024    CO2 26.0 2024     Lab Results   Component Value Date    WBC 5.0 2024    RBC 3.01 (L) 2024    HGB 9.4 (L) 2024    HCT 27.3 (L) 2024    .0 2024    MPV 11.5 2012    MCV 90.7 2024    MCH 31.2 2024    MCHC 34.4  03/24/2024    RDW 14.8 03/24/2024    NEPRELIM 2.94 03/24/2024    NEPERCENT 59.3 03/24/2024    LYPERCENT 26.0 03/24/2024    MOPERCENT 8.9 03/24/2024    EOPERCENT 4.0 03/24/2024    BAPERCENT 1.4 03/24/2024    NE 2.94 03/24/2024    LYMABS 1.29 03/24/2024    MOABSO 0.44 03/24/2024    EOABSO 0.20 03/24/2024    BAABSO 0.07 03/24/2024     Lab Results   Component Value Date    MALBP 167.00 05/24/2023    CREUR 142.00 05/24/2023    CREUR 142.00 05/24/2023     Lab Results   Component Value Date    COLORUR Yellow 01/03/2024    CLARITY Clear 01/03/2024    SPECGRAVITY 1.025 01/03/2024    GLUUR Negative 01/03/2024    BILUR Negative 01/03/2024    KETUR Trace (A) 01/03/2024    BLOODURINE Negative 01/03/2024    PHURINE 5.5 01/03/2024    PROUR >=300 (A) 01/03/2024    UROBILINOGEN 0.2 01/03/2024    NITRITE Negative 01/03/2024    LEUUR Negative 01/03/2024    WBCUR 6-10 (A) 01/03/2024    RBCUR 0-2 01/03/2024    EPIUR Few (A) 01/03/2024    BACUR Rare (A) 01/03/2024    CAOXUR Occasional (A) 04/20/2018    HYLUR Present (A) 05/14/2019         IMAGING:     Reviewed.    MEDICATIONS:       Current Facility-Administered Medications   Medication Dose Route Frequency    acetaminophen (Tylenol Extra Strength) tab 500 mg  500 mg Oral Q4H PRN    acetaminophen (Tylenol) tab 650 mg  650 mg Oral Q4H PRN    Or    HYDROcodone-acetaminophen (Norco) 5-325 MG per tab 1 tablet  1 tablet Oral Q4H PRN    Or    HYDROcodone-acetaminophen (Norco) 5-325 MG per tab 2 tablet  2 tablet Oral Q4H PRN    melatonin tab 3 mg  3 mg Oral Nightly PRN    ondansetron (Zofran) 4 MG/2ML injection 4 mg  4 mg Intravenous Q6H PRN    prochlorperazine (Compazine) 10 MG/2ML injection 5 mg  5 mg Intravenous Q8H PRN    pantoprazole (Protonix) 40 mg in sodium chloride 0.9% PF 10 mL IV push  40 mg Intravenous Q12H    sodium chloride 0.9 % IV bolus 100 mL  100 mL Intravenous Q30 Min PRN    And    albumin human (Albumin) 25% injection 25 g  25 g Intravenous PRN Dialysis    carvedilol (Coreg)  tab 25 mg  25 mg Oral BID with meals    cloNIDine (Catapres) tab 0.1 mg  0.1 mg Oral BID    hydrALAZINE (Apresoline) tab 50 mg  50 mg Oral BID    NIFEdipine ER (Procardia-XL) 24 hr tab 60 mg  60 mg Oral BID    losartan (Cozaar) tab 100 mg  100 mg Oral Daily    ARIPiprazole (Abilify) tab 15 mg  15 mg Oral Daily    benzonatate (Tessalon) cap 100 mg  100 mg Oral TID PRN    buPROPion ER (Wellbutrin XL) 24 hr tab 150 mg  150 mg Oral Daily    fenofibrate micronized (Lofibra) cap 134 mg  134 mg Oral Daily with breakfast    FLUoxetine (PROzac) cap 40 mg  40 mg Oral Daily    isosorbide mononitrate ER (Imdur) 24 hr tab 30 mg  30 mg Oral Daily    lamoTRIgine (LaMICtal) tab 150 mg  150 mg Oral Daily    amphetamine-dextroamphetamine (Adderall) tab 10 mg  10 mg Oral BID@0800,1200    sevelamer carbonate (Renvela) tab 800 mg  800 mg Oral TID CC    tamsulosin (Flomax) cap 0.4 mg  0.4 mg Oral Daily    umeclidinium bromide (Incruse Ellipta) 62.5 MCG/ACT inhaler 1 puff  1 puff Inhalation Daily       ASSESSMENT/PLAN:     44 yo M with history of ESRD TTS via RIJ tunnelled CVC ([facility]-Dr. Singletary), hyperaldosteronism with history of hypertensive urgency, DM, bipolar disorder, asthma presented with abdominal pain and BRBPR. Nephrology consulted for ESRD management.      ESRD:  -- HD TTS per outpatient schedule     Anemia in ESRD:  -- also with GI bleeding  -- epo with HD     MBD:  -- phos acceptable     HTN:  -- resume home meds     GI bleeding:  -- d/w Dr. Vu, possibly due to hemorrhoids     D/w RN. Will follow.    Thank you for allowing me to participate in the care of this patient. Please do not hesitate to call with questions or concerns.        Lenka Bond MD  [facility] Nephrology  [address] [place] [id number]    3/24/2024  10:59 AM

## 2024-03-24 NOTE — PROGRESS NOTES
03/24/24 1609 03/24/24 1610 03/24/24 1611   Vital Signs   Pulse 84 90 96   Heart Rate Source Monitor Monitor Monitor   /82 126/79 113/61   MAP (mmHg) 92 88 73   BP Location Left arm Left arm Left arm   BP Method Automatic Automatic Automatic   Patient Position Lying Sitting Standing     Patient complaining of lightheadedness especially with position changes. Orthostatic BP measurements above. Encouraged to take slower movements and give time for rest in between position changes to improve lightheadedness. Encouraged to call for staff assistance when getting up.

## 2024-03-24 NOTE — PROGRESS NOTES
[facility]   part of [facility] Hospitalist Progress Note     Gowdin Fonseca Patient Status:  Inpatient    1980 MRN [id number]   Location [facility] 5NW-A Attending Ria Gutierrez MD   Hosp Day # 1 PCP Prieto Novak MD     Chief Complaint:   FU: GI bleeding    Subjective:     Patient seen and examined.   No bm  No abd pain  Says he feels better  Afebrile      Objective:    Review of Systems:   10 point ROS completed and was negative, except for pertinent positive and negatives stated in subjective.    Vital signs:  Temp:  [97.6 °F (36.4 °C)-98.3 °F (36.8 °C)] 98.3 °F (36.8 °C)  Pulse:  [79-93] 79  Resp:  [16-22] 16  BP: (124-147)/(73-95) 137/95  SpO2:  [96 %-100 %] 97 %    Physical Exam:    General: No acute distress.   HEENT:  EOMI, PERRLA, OP clear  Respiratory: Clear to auscultation bilaterally. No wheezes. No rhonchi.  Cardiovascular: S1, S2. Regular rate and rhythm. No murmurs.  Abdomen: ttp in epig and lower abd regions   Extremities: No edema.  Neuro:  Grossly non focal, no motor deficits.        Diagnostic Data:    Labs:  Recent Labs   Lab 24  0725 24  1845 24  0640   WBC 6.7  --  5.0   HGB 9.1* 9.0* 9.4*   MCV 93.5  --  90.7   .0  --  203.0   INR 1.03  --   --        Recent Labs   Lab 24  0725 24  0640   * 101*   BUN 86* 37*   CREATSERUM 7.86* 4.67*   CA 7.0* 8.1*   ALB 3.3* 3.2*    139   K 4.5 4.0    109   CO2 21.0 26.0   ALKPHO 92  --    AST 14*  --    ALT 31  --    BILT 0.4  --    TP 6.7  --        Estimated Creatinine Clearance: 23.2 mL/min (A) (based on SCr of 4.67 mg/dL (H)).    Recent Labs   Lab 24  0725   PTP 13.5   INR 1.03            COVID-19 Lab Results    COVID-19  Lab Results   Component Value Date    COVID19 Not Detected 2024    COVID19 Not Detected 2024    COVID19 Not Detected 2024       Pro-Calcitonin  No results for input(s): \"PCT\" in the last 168  hours.    Cardiac  No results for input(s): \"TROP\", \"PBNP\" in the last 168 hours.    Creatinine Kinase  No results for input(s): \"CK\" in the last 168 hours.    Inflammatory Markers  No results for input(s): \"CRP\", \"HARJEET\", \"LDH\", \"DDIMER\" in the last 168 hours.    Imaging: Imaging data reviewed in Epic.    Medications:    pantoprazole  40 mg Intravenous Q12H    carvedilol  25 mg Oral BID with meals    cloNIDine  0.1 mg Oral BID    hydrALAZINE  50 mg Oral BID    NIFEdipine ER  60 mg Oral BID    losartan  100 mg Oral Daily    ARIPiprazole  15 mg Oral Daily    buPROPion ER  150 mg Oral Daily    fenofibrate micronized  134 mg Oral Daily with breakfast    FLUoxetine HCl  40 mg Oral Daily    isosorbide mononitrate ER  30 mg Oral Daily    lamoTRIgine  150 mg Oral Daily    amphetamine-dextroamphetamine  10 mg Oral BID@0800,1200    sevelamer carbonate  800 mg Oral TID CC    tamsulosin  0.4 mg Oral Daily    umeclidinium bromide  1 puff Inhalation Daily       Assessment & Plan:    Godwin Fonseca is a 43 year old male with PMH significant for ESRD on HD, bipolar, depression, DM 2, HTN, DL, CAD, COPD, HFpEF, chronic abdominal pain presents to the ED abdominal pain and GI bleeding.     BRBPR/GI bleeding  Chronic abd pain  -most likely diverticular  -monitor hgb >> 9.1 on admission  -hgb 9.4 this a  -IV PPI BID  -recheck labs in the morning  -GI consulted >> dw Dr Vu >> flex sig tomorrow  -NPO after MN  -CT a/p reviewed - rectal wall thickening most likely related to know hemorrhoids  -pain meds prn  -supportive care     HTN  DL  CAD  HFpEF  -resume home meds     DM2  -hold po meds  -ISS +accuchecks     ESRD on HD  -HD T/TH/Sa  -nephrology consulted     COPD  -no evidence of acute exacerbation  -resume home inhalers     MDD  Bipolar   -resume home meds     Chronic pain  -Follows with pain clinic outpatient  -Resume home pain medication.        Quality:  DVT Prophylaxis: scds, dvt ppx on hold due to gi bleeding  CODE status: full  code  Abbott: no  If COVID testing is negative, may discontinue isolation: yes      Plan of care discussed with patient and all questions answered.           Ria Gutierrez MD  [facility]  Pager [phone]  Answering Service number: [phone]

## 2024-03-24 NOTE — PROGRESS NOTES
Patient s/p dialysis.  Dialysis RN reported he tolerated it well. He is sleeping but easily aroused.  VSS.  Denies pain/discomfort.  He refused his night time dose of Procardia XL, Apresoline and Clonidine.  He said he doesn't take these medications after dialysis.   He has no complaints at this time.  Will follow.

## 2024-03-24 NOTE — CONSULTS
[place] IP Report of Consultation Gastroenterology    3/24/2024    Godwin Fonseca  male   Heath Vu MD     [id number]  4/12/1980 Primary Care Physician  Prieto Novak MD     Reason for Consultation: BRBPR    HPI: 43M well-known to [facility] from prior care. ESRD on HD, extensive diverticular disease + hemorrhoids, h/o colon polyps, chronic abd pain, h/o PE (off a/c), among multiple other issues.    Presents to ED w/ abd pain + recurrent BRBPR x1 days, no further BRBPR noted since PF ED transfer to EDW admission. Hgb stable for known baseline, tolerating PO intake w/o difficulty. No F/C or other worrisome clinical alarm features noted.    Pt reports persistent intermittent BRB over the last 6 months (see COL 12/30/23 w/ good bowel prep for findings below).    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 (HCC)    Chronic non-seasonal allergic rhinitis    Chronic sinusitis, unspecified location    ADHD (attention deficit hyperactivity disorder), inattentive type    Bipolar depression (HCC)    Essential hypertension    Mild persistent asthma without complication (HCC)    CKD (chronic kidney disease) stage 3, GFR 30-59 ml/min (HCC)    Self-inflicted injury    Bipolar disorder in partial remission, most recent episode unspecified type (HCC)    Family history of prostate cancer    Fatigue, unspecified type    Alkaline phosphatase elevation    Hyperlipidemia, mixed    Low serum HDL    Spinal stenosis of lumbar region with neurogenic claudication    Prolapsed lumbar disc    Status post lumbar surgery    Cauda equina syndrome (HCC)    Lumbar disc prolapse with compression radiculopathy    Syncope and collapse    Elevated troponin    Hypokalemia    Orthostatic hypotension    Hypertension, unspecified type    Weight loss    Chest pain, unspecified type    History of mitral valve stenosis    Acute pericarditis, unspecified type (HCC)     Cervical radiculopathy    Elevated blood protein    IgG monoclonal protein disorder    MGUS (monoclonal gammopathy of unknown significance)    Hypertensive urgency    Bipolar 2 disorder ([facility])    Employment problem    Stress    Anxiety disorder, unspecified type    Weakness of left lower extremity    Rectal bleeding    Paresthesia of foot, bilateral    Obesity (BMI 30-39.9)    TIA (transient ischemic attack)    TMJ dysfunction    Inverted papilloma of nasal cavity    Type 2 diabetes mellitus with stage 3b chronic kidney disease, without long-term current use of insulin ([facility])    Acute kidney injury ([facility])    Metabolic acidosis    Hyperglycemia    Chronic kidney disease, unspecified CKD stage    Abdominal distension    SOB (shortness of breath)    Hypertensive crisis    Acute on chronic congestive heart failure, unspecified heart failure type ([facility])    Acute congestive heart failure ([facility])    Acute congestive heart failure, unspecified heart failure type ([facility])    Acute on chronic diastolic congestive heart failure ([facility])    Stage 4 chronic kidney disease ([facility])    ROBERT (acute kidney injury) ([facility])    Abdominal pain, left lower quadrant    Hypertensive emergency    Acute chest pain    Acute on chronic renal insufficiency    Chronic abdominal pain    Nonintractable headache, unspecified chronicity pattern, unspecified headache type    Chronic right shoulder pain    HCAP (healthcare-associated pneumonia)    Acute intractable headache, unspecified headache type    ESRD (end stage renal disease) on dialysis ([facility])    Diarrhea, unspecified type    Primary hypertension    Dyspnea, unspecified type    Abdominal pain, acute    ESRD on dialysis ([facility])    Chronic renal failure, unspecified CKD stage    Fluid overload    ESRD needing dialysis ([facility])    COVID-19 virus infection    Hypotension, unspecified hypotension type    Anemia    Acute renal failure (ARF) ([facility])    Neck pain    Acute nonintractable headache, unspecified headache type     GI bleed    Gastrointestinal hemorrhage, unspecified gastrointestinal hemorrhage type    Chronic kidney disease with end stage renal failure on dialysis (HCC)       PATIENT HISTORY:     Past Medical History:   Diagnosis Date    Asthma (HCC)     Attention deficit hyperactivity disorder (ADHD)     Back problem     Bipolar 1 disorder (HCC)     Cancer (HCC)     CKD (chronic kidney disease) stage 3, GFR 30-59 ml/min ([facility])     Dr Meeks    Congestive heart disease (HCC)     COPD (chronic obstructive pulmonary disease) ([facility])     Coronary atherosclerosis     Deep vein thrombosis ([facility])     at age 17 R/T cast    Depression     Diabetes ([facility])     Essential hypertension 2014    3/21 echo: severe concentric LVH with normal EF and no MR or pHTN    Extrinsic asthma, unspecified     Heart attack (HCC)     2016- angiogram- no intervention    Heart valve disease     mitral valve repair in 1994/    High blood pressure     High cholesterol     History of mitral valve repair 12/2022    Hyperlipidemia     Low back pain     tight and stiff after sweeping and mopping    LVH (left ventricular hypertrophy)     Migraines     Mixed hyperlipidemia 03/2021     HDL 38 LDL 97 VLDL 57     Monoclonal gammopathy     IgG kappa     MVP (mitral valve prolapse)     Repair 1994 at [place]; echoes as recently as 3/21 show mild or trivial MR and no stenosis    Neuropathy     Osteoarthritis     hip ,knees    Pneumonia due to organism     Pulmonary embolism ([facility]) 01/2023    Renal disorder     TIA (transient ischemic attack) 03/2021    Initial history of left-sided weakness and slurred speech. (+) cocaine. MRI of the brain, CT angiogram of the head and neck, and 2D echo are all unremarkable.     TMJ (dislocation of temporomandibular joint)     Troponin level elevated 03/2021    Trop 60 60 47 with TIA and no CP: Lexiscan negative with EF 51      Past Surgical History:   Procedure Laterality Date    COLONOSCOPY N/A 03/26/2023    Procedure:  COLONOSCOPY;  Surgeon: Heath Vu MD;  Location:  ENDOSCOPY    COLONOSCOPY N/A 2023    Procedure: COLONOSCOPY with cold snare polypectomy and forcep polypectomy;  Surgeon: Ousmane Suarez MD;  Location:  ENDOSCOPY    COLONOSCOPY & POLYPECTOMY  2019    EGD  2019    Duodenitis. Biopsied. EUS for weight loss was negative    HEART SURGERY      HERNIA SURGERY  2022    Dr Barnes    LAMINECTOMY,>2 SGMT,LUMBAR  2018    L4-L5 Decomp Discectomy ROEM L4-L5    MITRALPLASTY W CP BYPASS      Christiano: Repair    REPAIR ROTATOR CUFF,CHRONIC Left     torn and had a ruptured bicep    VALVE REPAIR      mitral valve      Family History   Problem Relation Age of Onset    Hypertension Father     Alcohol and Other Disorders Associated Father     Substance Abuse Father         cocaine    Dementia Father     Cancer Father         lung    Diabetes Mother     Cancer Mother         multiple myeloma    Hypertension Mother     Anxiety Maternal Aunt     Depression Maternal Aunt     Anxiety Maternal Aunt     Depression Maternal Aunt     Bipolar Disorder Maternal Aunt     Diabetes Maternal Grandmother     Hypertension Maternal Grandmother     Cancer Maternal Grandfather         stomach cancer    Diabetes Maternal Grandfather     Hypertension Maternal Grandfather     Alcohol and Other Disorders Associated Maternal Grandfather     Hypertension Paternal Grandmother     Hypertension Paternal Grandfather     Cancer Sister         uterine and ovarian    Hypertension Sister     Cancer Maternal Uncle         lung    Cancer Paternal Aunt         throat      Social History     Socioeconomic History    Marital status:    Tobacco Use    Smoking status: Former     Packs/day: 1.00     Years: 27.00     Additional pack years: 0.00     Total pack years: 27.00     Types: Cigarettes     Quit date: 2022     Years since quittin.0    Smokeless tobacco: Never   Vaping Use    Vaping Use: Never used   Substance and Sexual  Activity    Alcohol use: No    Drug use: No     Social Determinants of Health     Food Insecurity: No Food Insecurity (3/23/2024)    Food Insecurity     Food Insecurity: Never true   Transportation Needs: No Transportation Needs (3/23/2024)    Transportation Needs     Lack of Transportation: No   Housing Stability: Low Risk  (3/23/2024)    Housing Stability     Housing Instability: No        Allergies;  Allergies   Allergen Reactions    Hydrochlorothiazide RASH and HIVES        Medications:    Current Facility-Administered Medications:     acetaminophen (Tylenol Extra Strength) tab 500 mg, 500 mg, Oral, Q4H PRN    acetaminophen (Tylenol) tab 650 mg, 650 mg, Oral, Q4H PRN **OR** HYDROcodone-acetaminophen (Norco) 5-325 MG per tab 1 tablet, 1 tablet, Oral, Q4H PRN **OR** HYDROcodone-acetaminophen (Norco) 5-325 MG per tab 2 tablet, 2 tablet, Oral, Q4H PRN    melatonin tab 3 mg, 3 mg, Oral, Nightly PRN    ondansetron (Zofran) 4 MG/2ML injection 4 mg, 4 mg, Intravenous, Q6H PRN    prochlorperazine (Compazine) 10 MG/2ML injection 5 mg, 5 mg, Intravenous, Q8H PRN    pantoprazole (Protonix) 40 mg in sodium chloride 0.9% PF 10 mL IV push, 40 mg, Intravenous, Q12H    sodium chloride 0.9 % IV bolus 100 mL, 100 mL, Intravenous, Q30 Min PRN **AND** albumin human (Albumin) 25% injection 25 g, 25 g, Intravenous, PRN Dialysis    carvedilol (Coreg) tab 25 mg, 25 mg, Oral, BID with meals    cloNIDine (Catapres) tab 0.1 mg, 0.1 mg, Oral, BID    hydrALAZINE (Apresoline) tab 50 mg, 50 mg, Oral, BID    NIFEdipine ER (Procardia-XL) 24 hr tab 60 mg, 60 mg, Oral, BID    losartan (Cozaar) tab 100 mg, 100 mg, Oral, Daily    ARIPiprazole (Abilify) tab 15 mg, 15 mg, Oral, Daily    benzonatate (Tessalon) cap 100 mg, 100 mg, Oral, TID PRN    buPROPion ER (Wellbutrin XL) 24 hr tab 150 mg, 150 mg, Oral, Daily    fenofibrate micronized (Lofibra) cap 134 mg, 134 mg, Oral, Daily with breakfast    FLUoxetine (PROzac) cap 40 mg, 40 mg, Oral, Daily     isosorbide mononitrate ER (Imdur) 24 hr tab 30 mg, 30 mg, Oral, Daily    lamoTRIgine (LaMICtal) tab 150 mg, 150 mg, Oral, Daily    amphetamine-dextroamphetamine (Adderall) tab 10 mg, 10 mg, Oral, BID@0800,1200    sevelamer carbonate (Renvela) tab 800 mg, 800 mg, Oral, TID CC    tamsulosin (Flomax) cap 0.4 mg, 0.4 mg, Oral, Daily    umeclidinium bromide (Incruse Ellipta) 62.5 MCG/ACT inhaler 1 puff, 1 puff, Inhalation, Daily     REVIEW OF SYSTEMS:   10pt ROS performed and o/w negative, see HPI for pertinent details.      EXAM:   BP (!) 137/95 (BP Location: Left arm)   Pulse 79   Temp 98.3 °F (36.8 °C) (Oral)   Resp 16   Ht 6' 2\" (1.88 m)   Wt 229 lb 4.5 oz (104 kg)   SpO2 97%   BMI 29.44 kg/m²  Body mass index is 29.44 kg/m².  Gen: cooperative, pleasant, not diaphoretic, nad   HEENT: ncat, normal neck ROM, normal op w/o ulcer/exudate, anicteric, mmm   Resp/Chest: normal respiratory effort, not dyspneic, no incr WOB/cough  CV: no reported palpitations or syncope  Abd: nt, nd, no clinical features suggestive of peritoneal s/s noted  Ext: no c/c/e   Skin: warm, perfused, no jaundice, no pallor  Neuro: aaox3, grossly intact, no asterixis noted, normal speech       LAB/IMAGING RESULTS:     Lab Results   Component Value Date    WBC 5.0 03/24/2024    HGB 9.4 03/24/2024    HCT 27.3 03/24/2024    .0 03/24/2024     [unfilled]  Lab Results   Component Value Date    WBC 5.0 03/24/2024    HGB 9.4 03/24/2024    HCT 27.3 03/24/2024    .0 03/24/2024    CREATSERUM 4.67 03/24/2024    BUN 37 03/24/2024     03/24/2024    K 4.0 03/24/2024     03/24/2024    CO2 26.0 03/24/2024     03/24/2024    CA 8.1 03/24/2024    ALB 3.2 03/24/2024    PHOS 3.8 03/24/2024     Inpt EDW COL 12/30/23 (Similar sxs, good prep. Images reviewed, indurated non-bleeding IH at that time.)  PREOPERATIVE DIAGNOSIS                Rectal bleeding                 Chronic constipation  POSTOPERATIVE DIAGNOSIS:  7 mm transverse  colon polyp removed via cold snare.  5 mm sigmoid colon polyps x 2 were removed via cold biopsy forceps.  Diverticulosis were scattered throughout the colon.  Enlarged internal hemorrhoids.     Contrast CT AP 3/23  CONCLUSION:  There is an irregular appearance of the right posterior lateral rectal wall.  This could be related to blood products or stool with wall thickening possible but felt to be less likely.  Correlation with direct visualization is suggested.  No    free fluid is seen within the abdomen or pelvis.     ASSESSMENT AND PLAN:   #Recurrent BRBPR in setting of known extensive colonic diverticular burden + indurated internal hemorrhoids (since resolved)  #ESRD on HD  #Abnormal CT of abdomen (rectal wall thickening most likely corresponding to known hemorrhoid noted on 12/2023 COL)    Likely recurrent MIGUEL from known hemorrhoids, recently evaluated w/ good preparation 12/2023.  Bleeding has since stopped, no evidence of hemodynamic compromise or significant Hgb change from baseline.  Will move forward w/ diag MAC FS tomorrow per pt's request, may help risk-stratify next steps.  Soft diet for now, TWE x2 tomorrow morning for FS.  NPO after MN.  Gentle perianal cares reviewed again today.  Counseled re: role for Surgery consultation (inpt or outpt) to discuss surgical options for hemorrhoids.  Counseled on RBA's of surgical mgmt, and discussed pt's multiple medical comorbidities that might portend challenges for surgical candidacy. Conservative mgmt was advised for now.  Will continue to follow w/ you.  Case discussed w/ pt + RN this morning.    The patient indicates understanding of these issues and agrees to the plan.      A total of 60 minutes was spent in direct patient care + assessment, chart review, and care coordination today.    Heath Vu MD  Department of Gastroenterology  [facility]  3/24/2024  8:27 AM

## 2024-03-24 NOTE — PLAN OF CARE
Problem: CARDIOVASCULAR - ADULT  Goal: Maintains optimal cardiac output and hemodynamic stability  Description: INTERVENTIONS:  - Monitor vital signs, rhythm, and trends  - Monitor for bleeding, hypotension and signs of decreased cardiac output  - Evaluate effectiveness of vasoactive medications to optimize hemodynamic stability  - Monitor arterial and/or venous puncture sites for bleeding and/or hematoma  - Assess quality of pulses, skin color and temperature  - Assess for signs of decreased coronary artery perfusion - ex. Angina  - Evaluate fluid balance, assess for edema, trend weights  Outcome: Progressing  Goal: Absence of cardiac arrhythmias or at baseline  Description: INTERVENTIONS:  - Continuous cardiac monitoring, monitor vital signs, obtain 12 lead EKG if indicated  - Evaluate effectiveness of antiarrhythmic and heart rate control medications as ordered  - Initiate emergency measures for life threatening arrhythmias  - Monitor electrolytes and administer replacement therapy as ordered  Outcome: Progressing     Problem: GASTROINTESTINAL - ADULT  Goal: Maintains adequate nutritional intake (undernourished)  Description: INTERVENTIONS:  - Monitor percentage of each meal consumed  - Identify factors contributing to decreased intake, treat as appropriate  - Assist with meals as needed  - Monitor I&O, WT and lab values  - Obtain nutritional consult as needed  - Optimize oral hygiene and moisture  - Encourage food from home; allow for food preferences  - Enhance eating environment  Outcome: Progressing     Problem: METABOLIC/FLUID AND ELECTROLYTES - ADULT  Goal: Electrolytes maintained within normal limits  Description: INTERVENTIONS:  - Monitor labs and rhythm and assess patient for signs and symptoms of electrolyte imbalances  - Administer electrolyte replacement as ordered  - Monitor response to electrolyte replacements, including rhythm and repeat lab results as appropriate  - Fluid restriction as  ordered  - Instruct patient on fluid and nutrition restrictions as appropriate  Outcome: Progressing  Goal: Hemodynamic stability and optimal renal function maintained  Description: INTERVENTIONS:  - Monitor labs and assess for signs and symptoms of volume excess or deficit  - Monitor intake, output and patient weight  - Monitor urine specific gravity, serum osmolarity and serum sodium as indicated or ordered  - Monitor response to interventions for patient's volume status, including labs, urine output, blood pressure (other measures as available)  - Encourage oral intake as appropriate  - Instruct patient on fluid and nutrition restrictions as appropriate  Outcome: Progressing     Problem: HEMATOLOGIC - ADULT  Goal: Maintains hematologic stability  Description: INTERVENTIONS  - Assess for signs and symptoms of bleeding or hemorrhage  - Monitor labs and vital signs for trends  - Administer supportive blood products/factors, fluids and medications as ordered and appropriate  - Administer supportive blood products/factors as ordered and appropriate  Outcome: Progressing  Goal: Free from bleeding injury  Description: (Example usage: patient with low platelets)  INTERVENTIONS:  - Avoid intramuscular injections, enemas and rectal medication administration  - Ensure safe mobilization of patient  - Hold pressure on venipuncture sites to achieve adequate hemostasis  - Assess for signs and symptoms of internal bleeding  - Monitor lab trends  - Patient is to report abnormal signs of bleeding to staff  - Avoid use of toothpicks and dental floss  - Use electric shaver for shaving  - Use soft bristle tooth brush  - Limit straining and forceful nose blowing  Outcome: Progressing     Problem: Patient/Family Goals  Goal: Patient/Family Long Term Goal  Description: Patient's Long Term Goal: to discharge with adequate resources    Interventions:  - follow plan of care  - collaborate with healthcare team  - See additional Care Plan  goals for specific interventions  Outcome: Progressing  Goal: Patient/Family Short Term Goal  Description: Patient's Short Term Goal:   3/23 am: decreased bleeding  03/23/2024 - control abdominal pain    Interventions:   - GI consult  - renal consult  - norco as ordered  - See additional Care Plan goals for specific interventions  Outcome: Progressing

## 2024-03-25 ENCOUNTER — ANESTHESIA EVENT (OUTPATIENT)
Dept: ENDOSCOPY | Facility: HOSPITAL | Age: 44
End: 2024-03-25
Payer: MEDICARE

## 2024-03-25 ENCOUNTER — ANESTHESIA (OUTPATIENT)
Dept: ENDOSCOPY | Facility: HOSPITAL | Age: 44
End: 2024-03-25
Payer: MEDICARE

## 2024-03-25 VITALS
BODY MASS INDEX: 29.42 KG/M2 | WEIGHT: 229.25 LBS | DIASTOLIC BLOOD PRESSURE: 94 MMHG | HEIGHT: 74 IN | SYSTOLIC BLOOD PRESSURE: 135 MMHG | RESPIRATION RATE: 15 BRPM | HEART RATE: 72 BPM | TEMPERATURE: 98 F | OXYGEN SATURATION: 95 %

## 2024-03-25 LAB
ALBUMIN SERPL-MCNC: 3.3 G/DL (ref 3.4–5)
ANION GAP SERPL CALC-SCNC: 8 MMOL/L (ref 0–18)
BUN BLD-MCNC: 48 MG/DL (ref 9–23)
CALCIUM BLD-MCNC: 7.9 MG/DL (ref 8.5–10.1)
CHLORIDE SERPL-SCNC: 110 MMOL/L (ref 98–112)
CO2 SERPL-SCNC: 22 MMOL/L (ref 21–32)
CREAT BLD-MCNC: 5.58 MG/DL
EGFRCR SERPLBLD CKD-EPI 2021: 12 ML/MIN/1.73M2 (ref 60–?)
GLUCOSE BLD-MCNC: 98 MG/DL (ref 70–99)
OSMOLALITY SERPL CALC.SUM OF ELEC: 303 MOSM/KG (ref 275–295)
PHOSPHATE SERPL-MCNC: 3.9 MG/DL (ref 2.5–4.9)
POTASSIUM SERPL-SCNC: 4.3 MMOL/L (ref 3.5–5.1)
SODIUM SERPL-SCNC: 140 MMOL/L (ref 136–145)

## 2024-03-25 PROCEDURE — 80069 RENAL FUNCTION PANEL: CPT | Performed by: INTERNAL MEDICINE

## 2024-03-25 PROCEDURE — C9113 INJ PANTOPRAZOLE SODIUM, VIA: HCPCS | Performed by: INTERNAL MEDICINE

## 2024-03-25 PROCEDURE — 94640 AIRWAY INHALATION TREATMENT: CPT

## 2024-03-25 PROCEDURE — 0DJD8ZZ INSPECTION OF LOWER INTESTINAL TRACT, VIA NATURAL OR ARTIFICIAL OPENING ENDOSCOPIC: ICD-10-PCS | Performed by: INTERNAL MEDICINE

## 2024-03-25 RX ORDER — MORPHINE SULFATE 2 MG/ML
0.5 INJECTION, SOLUTION INTRAMUSCULAR; INTRAVENOUS EVERY 2 HOUR PRN
Status: DISCONTINUED | OUTPATIENT
Start: 2024-03-25 | End: 2024-03-25

## 2024-03-25 RX ORDER — MORPHINE SULFATE 2 MG/ML
1 INJECTION, SOLUTION INTRAMUSCULAR; INTRAVENOUS EVERY 2 HOUR PRN
Status: DISCONTINUED | OUTPATIENT
Start: 2024-03-25 | End: 2024-03-25

## 2024-03-25 RX ORDER — SODIUM CHLORIDE, SODIUM LACTATE, POTASSIUM CHLORIDE, CALCIUM CHLORIDE 600; 310; 30; 20 MG/100ML; MG/100ML; MG/100ML; MG/100ML
INJECTION, SOLUTION INTRAVENOUS CONTINUOUS
Status: DISCONTINUED | OUTPATIENT
Start: 2024-03-25 | End: 2024-03-25

## 2024-03-25 RX ORDER — MORPHINE SULFATE 2 MG/ML
2 INJECTION, SOLUTION INTRAMUSCULAR; INTRAVENOUS EVERY 2 HOUR PRN
Status: DISCONTINUED | OUTPATIENT
Start: 2024-03-25 | End: 2024-03-25

## 2024-03-25 RX ORDER — HYDROCORTISONE ACETATE 25 MG/1
25 SUPPOSITORY RECTAL EVERY EVENING
Status: DISCONTINUED | OUTPATIENT
Start: 2024-03-25 | End: 2024-03-25

## 2024-03-25 RX ORDER — HYDROCORTISONE ACETATE 25 MG/1
SUPPOSITORY RECTAL
Qty: 7 SUPPOSITORY | Refills: 4 | Status: SHIPPED | OUTPATIENT
Start: 2024-03-25

## 2024-03-25 RX ORDER — MULTIVITAMIN WITH IRON
1 TABLET ORAL DAILY
Status: DISCONTINUED | OUTPATIENT
Start: 2024-03-25 | End: 2024-03-25

## 2024-03-25 RX ORDER — LIDOCAINE HYDROCHLORIDE 10 MG/ML
INJECTION, SOLUTION EPIDURAL; INFILTRATION; INTRACAUDAL; PERINEURAL AS NEEDED
Status: DISCONTINUED | OUTPATIENT
Start: 2024-03-25 | End: 2024-03-25 | Stop reason: SURG

## 2024-03-25 RX ORDER — NALOXONE HYDROCHLORIDE 0.4 MG/ML
0.08 INJECTION, SOLUTION INTRAMUSCULAR; INTRAVENOUS; SUBCUTANEOUS ONCE AS NEEDED
Status: DISCONTINUED | OUTPATIENT
Start: 2024-03-25 | End: 2024-03-25 | Stop reason: HOSPADM

## 2024-03-25 RX ADMIN — LIDOCAINE HYDROCHLORIDE 100 MG: 10 INJECTION, SOLUTION EPIDURAL; INFILTRATION; INTRACAUDAL; PERINEURAL at 13:31:00

## 2024-03-25 NOTE — PLAN OF CARE

## 2024-03-25 NOTE — PROGRESS NOTES
[facility]   part of [facility]    Nephrology Progress Note    Godwin Fonseca Attending:  Ria Gutierrez MD       SUBJECTIVE:     No acute complaints.     PHYSICAL EXAM:     Vital Signs: BP (!) 135/94   Pulse 72   Temp 97.6 °F (36.4 °C) (Oral)   Resp 15   Ht 6' 2\" (1.88 m)   Wt 229 lb 4.5 oz (104 kg)   SpO2 95%   BMI 29.44 kg/m²   Temp (24hrs), Av.6 °F (36.4 °C), Min:97.2 °F (36.2 °C), Max:98 °F (36.7 °C)       Intake/Output Summary (Last 24 hours) at 3/25/2024 1528  Last data filed at 3/25/2024 1403  Gross per 24 hour   Intake 480 ml   Output 1950 ml   Net -1470 ml     Wt Readings from Last 3 Encounters:   24 229 lb 4.5 oz (104 kg)   24 245 lb (111.1 kg)   03/10/24 246 lb 14.6 oz (112 kg)       General: pleasant, well appearing  Skin: no visible rashes  HEENT: NCAT  Cardiac: RRR  Lungs: no distress  Abdomen: Soft, NTND  Extremities: warm, well perfused, no leg edema  Neurologic/Psych: mentating well  RIJ tunnelled CVC    LABORATORY DATA:     Lab Results   Component Value Date    GLU 98 2024    BUN 48 (H) 2024    BUNCREA 13.0 2022    CREATSERUM 5.58 (H) 2024    ANIONGAP 8 2024    GFR 59 (L) 2018    GFRNAA 30 (L) 2022    GFRAA 35 (L) 2022    CA 7.9 (L) 2024    OSMOCALC 303 (H) 2024    ALKPHO 92 2024    AST 14 (L) 2024    ALT 31 2024    BILT 0.4 2024    TP 6.7 2024    ALB 3.3 (L) 2024    GLOBULIN 3.4 2024     2024    K 4.3 2024     2024    CO2 22.0 2024     Lab Results   Component Value Date    WBC 5.0 2024    RBC 3.01 (L) 2024    HGB 9.4 (L) 2024    HCT 27.3 (L) 2024    .0 2024    MPV 11.5 2012    MCV 90.7 2024    MCH 31.2 2024    MCHC 34.4 2024    RDW 14.8 2024    NEPRELIM 2.94 2024    NEPERCENT 59.3 2024    LYPERCENT 26.0 2024    MOPERCENT 8.9 2024     EOPERCENT 4.0 03/24/2024    BAPERCENT 1.4 03/24/2024    NE 2.94 03/24/2024    LYMABS 1.29 03/24/2024    MOABSO 0.44 03/24/2024    EOABSO 0.20 03/24/2024    BAABSO 0.07 03/24/2024     Lab Results   Component Value Date    MALBP 167.00 05/24/2023    CREUR 142.00 05/24/2023    CREUR 142.00 05/24/2023     Lab Results   Component Value Date    COLORUR Yellow 01/03/2024    CLARITY Clear 01/03/2024    SPECGRAVITY 1.025 01/03/2024    GLUUR Negative 01/03/2024    BILUR Negative 01/03/2024    KETUR Trace (A) 01/03/2024    BLOODURINE Negative 01/03/2024    PHURINE 5.5 01/03/2024    PROUR >=300 (A) 01/03/2024    UROBILINOGEN 0.2 01/03/2024    NITRITE Negative 01/03/2024    LEUUR Negative 01/03/2024    WBCUR 6-10 (A) 01/03/2024    RBCUR 0-2 01/03/2024    EPIUR Few (A) 01/03/2024    BACUR Rare (A) 01/03/2024    CAOXUR Occasional (A) 04/20/2018    HYLUR Present (A) 05/14/2019         IMAGING:     Reviewed    ASSESSMENT/PLAN:     42 yo M with history of ESRD TTS via RIJ tunnelled CVC ([facility]-Dr. Singletary), hyperaldosteronism with history of hypertensive urgency, DM, bipolar disorder, asthma presented with abdominal pain and BRBPR. Nephrology consulted for ESRD management.      ESRD:  -- HD TTS per outpatient schedule     Anemia in ESRD:  -- also with GI bleeding  -- epo with HD     MBD:  -- phos acceptable     HTN:  -- resume home meds     GI bleeding:  -- possibly due to hemorrhoids     Will follow.    Thank you for allowing me to participate in the care of this patient. Please do not hesitate to call with questions or concerns.        Samantha Muñoz, DO  [facility] - Nephrology

## 2024-03-25 NOTE — ANESTHESIA POSTPROCEDURE EVALUATION
[facility]    Godwin Fonseca Patient Status:  Inpatient   Age/Gender 43 year old male MRN [id number]   Location [facility] Attending Minna Costello DO   Hosp Day # 2 PCP Prieto Novak MD       Anesthesia Post-op Note    FLEXIBLE SIGMOIDOSCOPY    Procedure Summary       Date: 03/25/24 Room / Location:  ENDOSCOPY 03 / [facility] END[facility]COPY    Anesthesia Start: 1328 Anesthesia Stop: 1341    Procedure: FLEXIBLE SIGMOIDOSCOPY Diagnosis:       Diverticulitis      (hemorrhoids)    Surgeons: Kam Delgado MD Anesthesiologist: Sotero Pierce MD    Anesthesia Type: MAC ASA Status: 3            Anesthesia Type: MAC    Vitals Value Taken Time   /77 03/25/24 1345   Temp  03/25/24 1345   Pulse 82 03/25/24 1345   Resp 16 03/25/24 1345   SpO2 96 03/25/24 1345       Patient Location: Endoscopy    Anesthesia Type: MAC    Airway Patency: patent    Postop Pain Control: adequate    Mental Status: preanesthetic baseline    Nausea/Vomiting: none    Cardiopulmonary/Hydration status: stable euvolemic    Complications: no apparent anesthesia related complications    Postop vital signs: stable    Dental Exam: Unchanged from Preop

## 2024-03-25 NOTE — PROGRESS NOTES
Patient A&Ox4.  Denies discomfort.  Saturating above 90% on room air.  NSR on telemetry.  No cough, no fever.  He denies nausea, no vomiting, no stool.  He has not voided so far this shift.  Plan is for him to be NPO after midnight tonight  and receive tap water enema 2x in am in preparation for a  Flex Sig.  Plan of care reviewed with him and he verbalized understanding.

## 2024-03-25 NOTE — ANESTHESIA PREPROCEDURE EVALUATION
PRE-OP EVALUATION    Patient Name: Godwin Fonseca    Admit Diagnosis: Rectal bleeding [K62.5]    Pre-op Diagnosis: Rectal bleeding [K62.5]    FLEXIBLE SIGMOIDOSCOPY    Anesthesia Procedure: FLEXIBLE SIGMOIDOSCOPY    Surgeon(s) and Role:     Danny CHO    Pre-op vitals reviewed.  Temp: 97.6 °F (36.4 °C)  Pulse: 86  Resp: 20  BP: 165/96  SpO2: 97 %  Body mass index is 29.44 kg/m².    Current medications reviewed.  Hospital Medications:   morphINE PF 2 MG/ML injection 0.5 mg  0.5 mg Intravenous Q2H PRN    Or    morphINE PF 2 MG/ML injection 1 mg  1 mg Intravenous Q2H PRN    Or    morphINE PF 2 MG/ML injection 2 mg  2 mg Intravenous Q2H PRN    [COMPLETED] ondansetron (Zofran) 4 MG/2ML injection 4 mg  4 mg Intravenous Once    [COMPLETED] pantoprazole (Protonix) 40 mg in sodium chloride 0.9% PF 10 mL IV push  40 mg Intravenous Once    acetaminophen (Tylenol Extra Strength) tab 500 mg  500 mg Oral Q4H PRN    acetaminophen (Tylenol) tab 650 mg  650 mg Oral Q4H PRN    Or    HYDROcodone-acetaminophen (Norco) 5-325 MG per tab 1 tablet  1 tablet Oral Q4H PRN    Or    HYDROcodone-acetaminophen (Norco) 5-325 MG per tab 2 tablet  2 tablet Oral Q4H PRN    melatonin tab 3 mg  3 mg Oral Nightly PRN    ondansetron (Zofran) 4 MG/2ML injection 4 mg  4 mg Intravenous Q6H PRN    prochlorperazine (Compazine) 10 MG/2ML injection 5 mg  5 mg Intravenous Q8H PRN    pantoprazole (Protonix) 40 mg in sodium chloride 0.9% PF 10 mL IV push  40 mg Intravenous Q12H    sodium chloride 0.9 % IV bolus 100 mL  100 mL Intravenous Q30 Min PRN    And    albumin human (Albumin) 25% injection 25 g  25 g Intravenous PRN Dialysis    carvedilol (Coreg) tab 25 mg  25 mg Oral BID with meals    cloNIDine (Catapres) tab 0.1 mg  0.1 mg Oral BID    hydrALAZINE (Apresoline) tab 50 mg  50 mg Oral BID    NIFEdipine ER (Procardia-XL) 24 hr tab 60 mg  60 mg Oral BID    losartan (Cozaar) tab 100 mg  100 mg Oral Daily    ARIPiprazole (Abilify) tab 15 mg  15 mg Oral Daily     benzonatate (Tessalon) cap 100 mg  100 mg Oral TID PRN    buPROPion ER (Wellbutrin XL) 24 hr tab 150 mg  150 mg Oral Daily    fenofibrate micronized (Lofibra) cap 134 mg  134 mg Oral Daily with breakfast    FLUoxetine (PROzac) cap 40 mg  40 mg Oral Daily    isosorbide mononitrate ER (Imdur) 24 hr tab 30 mg  30 mg Oral Daily    lamoTRIgine (LaMICtal) tab 150 mg  150 mg Oral Daily    amphetamine-dextroamphetamine (Adderall) tab 10 mg  10 mg Oral BID@0800,1200    sevelamer carbonate (Renvela) tab 800 mg  800 mg Oral TID CC    tamsulosin (Flomax) cap 0.4 mg  0.4 mg Oral Daily    umeclidinium bromide (Incruse Ellipta) 62.5 MCG/ACT inhaler 1 puff  1 puff Inhalation Daily    [COMPLETED] iopamidol 76% (ISOVUE-370) injection for power injector  100 mL Intravenous ONCE PRN       Outpatient Medications:     Medications Prior to Admission   Medication Sig Dispense Refill Last Dose    isosorbide mononitrate ER 30 MG Oral Tablet 24 Hr Take 1 tablet (30 mg total) by mouth daily. Hold if systolic blood pressure <130   3/23/2024 at 0600    tamsulosin 0.4 MG Oral Cap Take 1 capsule (0.4 mg total) by mouth daily.   3/23/2024 at 0600    Lisdexamfetamine Dimesylate 60 MG Oral Cap Take 1 capsule (60 mg total) by mouth every morning.   3/23/2024 at 0600    buPROPion  MG Oral Tablet 24 Hr Take 1 tablet (150 mg total) by mouth daily.   3/23/2024 at 0600    ARIPiprazole 15 MG Oral Tab Take 1 tablet (15 mg total) by mouth daily.   3/23/2024 at 0600    bisacodyl 5 MG Oral Tab EC Take 3 tablets (15 mg total) by mouth daily. 30 tablet 0 3/23/2024    polyethylene glycol, PEG 3350, 17 g Oral Powd Pack Take 17 g by mouth in the morning and 17 g before bedtime. 60 packet 0 3/23/2024    lamoTRIgine (LAMICTAL) 150 MG Oral Tab Take 1 tablet (150 mg total) by mouth daily. 7 tablet 0 3/23/2024 at 0600    FLUoxetine HCl 40 MG Oral Cap Take 1 capsule (40 mg total) by mouth daily. 7 capsule 0 3/23/2024 at 0600    RENVELA 800 MG Oral Tab Take 1 tablet  (800 mg total) by mouth 3 (three) times daily with meals.   3/23/2024 at 0600    losartan 100 MG Oral Tab Take 1 tablet (100 mg total) by mouth daily. Hold if systolic blood pressure <130   3/23/2024 at 0600    furosemide 80 MG Oral Tab Take 1 tablet (80 mg total) by mouth 2 (two) times daily. Hold if systolic blood pressure <130   3/23/2024 at 0600    hydrALAZINE 50 MG Oral Tab Take 1 tablet (50 mg total) by mouth in the morning and 1 tablet (50 mg total) before bedtime. Hold if systolic blood pressure <130. 30 tablet 0 3/23/2024 at 0600    NIFEdipine ER 60 MG Oral Tablet 24 Hr Take 1 tablet (60 mg total) by mouth in the morning and 1 tablet (60 mg total) before bedtime. Hold if systolic blood pressure <130.   3/23/2024 at 0600    cloNIDine 0.1 MG Oral Tab Take 1 tablet (0.1 mg total) by mouth 2 (two) times daily. 60 tablet 0 3/23/2024 at 0600    carvedilol 25 MG Oral Tab Take 1 tablet (25 mg total) by mouth in the morning and 1 tablet (25 mg total) in the evening. Take with meals. 60 tablet 6 3/23/2024 at 0600    Tiotropium Bromide Monohydrate (SPIRIVA HANDIHALER) 18 MCG Inhalation Cap 1 capsule (18 mcg total) daily.   3/23/2024    Fenofibrate 134 MG Oral Cap Take 1 capsule by mouth nightly. 90 capsule 1 3/23/2024 at 0600    Fluticasone Propionate 50 MCG/ACT Nasal Suspension SPRAY ONCE INTO EACH NOSTRIL BID PRN 15.8 mL 0 3/22/2024    benzonatate 100 MG Oral Cap Take 1 capsule (100 mg total) by mouth 3 (three) times daily as needed for cough. 30 capsule 0     Hyoscyamine Sulfate 0.125 MG Oral Tab Take 1 tablet (0.125 mg total) by mouth every 6 (six) hours as needed for Cramping.       potassium chloride 20 MEQ Oral Tab CR Take 1 tablet (20 mEq total) by mouth daily.       ergocalciferol 1.25 MG (13915 UT) Oral Cap Take 1 capsule (50,000 Units total) by mouth Every Monday.       albuterol 108 (90 Base) MCG/ACT Inhalation Aero Soln albuterol sulfate HFA 90 mcg/actuation aerosol inhaler   INHALE 1 TO 2 PUFFS BY MOUTH  EVERY 4-6 HOURS AS NEEDED FOR COUGH OR WHEEZING       TRULICITY 0.75 MG/0.5ML Subcutaneous Solution Pen-injector once a week. EVERY MONDAY       acetaminophen 500 MG Oral Tab Take 1 tablet (500 mg total) by mouth every 6 (six) hours as needed for Pain.          Allergies: Hydrochlorothiazide      Anesthesia Evaluation    Patient summary reviewed.    Anesthetic Complications  (-) history of anesthetic complications         GI/Hepatic/Renal             (+) chronic renal disease (stage 4 CKD) and CRI and hemodialysis                   Cardiovascular  Comment: Echo 9/22  1. Left ventricle: The cavity size was normal. Wall thickness was mildly      increased. There was concentric hypertrophy. Systolic function was      normal. The estimated ejection fraction was 55-60%. No regional wall      motion abnormalities. Features are consistent with a pseudonormal left      ventricular filling pattern, with concomitant abnormal relaxation and      increased filling pressure - grade 2 diastolic dysfunction.   2. Mitral valve: s/p mitral valve repair in 1994. There was      moderate,posteriorly directed mitral regurgitation Due to the eccentric      jet, the mitral regurgitation is likely underestimated   3. Left atrium: The left atrium was moderately dilated.  Negative cardiovascular ROS.  ECG reviewed.  Exercise tolerance: poor     MET: <=4    (+) obesity  (+) hypertension and well controlled  (+) hyperlipidemia  (+) CAD        (+) valvular problems/murmurs (MVR in 94 but still with mod regurg) and MR       (+) CHF                Endo/Other      (+) diabetes and well controlled,  not using insulin      (+) anemia                   Pulmonary      (+) asthma  (+) COPD                   Neuro/Psych      (+) depression  (+) anxiety     (+) TIA      (+) headaches  (+) psychiatric history (bipolar disorder)                 Past Surgical History:   Procedure Laterality Date    COLONOSCOPY N/A 03/26/2023    Procedure: COLONOSCOPY;   Surgeon: Heath Vu MD;  Location:  ENDOSCOPY    COLONOSCOPY N/A 2023    Procedure: COLONOSCOPY with cold snare polypectomy and forcep polypectomy;  Surgeon: Ousmane Suarez MD;  Location:  ENDOSCOPY    COLONOSCOPY & POLYPECTOMY  2019    EGD  2019    Duodenitis. Biopsied. EUS for weight loss was negative    HEART SURGERY      HERNIA SURGERY  2022    Dr Barnes    LAMINECTOMY,>2 SGMT,LUMBAR  2018    L4-L5 Decomp Discectomy ROEM L4-L5    MITRALPLASTY W CP BYPASS      [place]: Repair    REPAIR ROTATOR CUFF,CHRONIC Left     torn and had a ruptured bicep    VALVE REPAIR      mitral valve     Social History     Socioeconomic History    Marital status:    Tobacco Use    Smoking status: Former     Packs/day: 1.00     Years: 27.00     Additional pack years: 0.00     Total pack years: 27.00     Types: Cigarettes     Quit date: 2022     Years since quittin.0    Smokeless tobacco: Never   Vaping Use    Vaping Use: Never used   Substance and Sexual Activity    Alcohol use: No    Drug use: No     History   Drug Use No     Available pre-op labs reviewed.  Lab Results   Component Value Date    WBC 5.0 2024    RBC 3.01 (L) 2024    HGB 9.4 (L) 2024    HCT 27.3 (L) 2024    MCV 90.7 2024    MCH 31.2 2024    MCHC 34.4 2024    RDW 14.8 2024    .0 2024     Lab Results   Component Value Date     2024    K 4.3 2024     2024    CO2 22.0 2024    BUN 48 (H) 2024    CREATSERUM 5.58 (H) 2024    GLU 98 2024    CA 7.9 (L) 2024     Lab Results   Component Value Date    INR 1.03 2024         Airway      Mallampati: II  Mouth opening: >3 FB  TM distance: > 6 cm  Neck ROM: full Cardiovascular    Cardiovascular exam normal.         Dental    Dentition appears grossly intact         Pulmonary    Pulmonary exam normal.                 Other findings            ASA: 3   Plan:  MAC  NPO status verified and patient meets guidelines.  Patient has taken beta blockers in last 24 hours.        Plan/risks discussed with: patient              Present on Admission:  **None**

## 2024-03-25 NOTE — OPERATIVE REPORT
[facility] Patient Status:  Inpatient    1980 MRN [id number]   Location [facility] ENDOSCOPY [facility] Attending Minna Costello DO   Hosp Day # 2 PCP Prieto Novak MD         PATIENT NAME: Godwin Fonseca  DATE OF OPERATION: 3/25/2024    PREOPERATIVE DIAGNOSIS:  Rectal bleeding.  Colonoscopy 3/23,   POSTOPERATIVE DIAGNOSIS:  Large internal hemorrhoids, punctate fresh blood  Normal sigmoid to 40 cm. No blood seen     PROCEDURE PERFORMED: Sigmoidoscopy to 40 cm with MAC sedation  SURGEON: Kam Delgado MD   MEDICATIONS: MAC IV in divided doses under the supervision of Anesthesia.  PROCEDURE AND FINDINGS: The patient was placed into the left lateral decubitus position after informed consent was obtained.  All questions were answered.  MAC IV sedation was administered.  A rectal exam was performed which was normal.  The Olympus video colonoscope was then introduced through the rectum and advanced through the sigmoid colon at 40 cm.  The colonoscope was then withdrawn and the mucosa was further carefully inspected.  No blood was seen.  The remainder of the entire examined colon was otherwise normal.  Large internal hemorrhoids with fresh blood were noted upon retroflexion in the rectum, the colonoscope was straightened and removed and the procedure was completed. The patient tolerated the procedure well. There were no implants placed nor significant blood loss. There were no immediate apparent complications.   Moderate sedation time:  None.  Deep sedation provided by anesthesia    RECOMMENDATIONS   Anusol HC 25 mg supp x 1 week.  Outpt surgery eval if bleeding persists  DC home      Kam Delgado MD

## 2024-03-25 NOTE — DIETARY NOTE
[facility]   part of [facility]    NUTRITION ASSESSMENT    Pt meets severe malnutrition criteria at this time.    CRITERIA FOR MALNUTRITION DIAGNOSIS:  Criteria for severe malnutrition diagnosis: chronic illness related to energy intake less than 75% for greater than 1 month and Wt loss of 9.4 kg, 8%, over 3 months.      NUTRITION INTERVENTION:    RD nutrition Care Plan- See RD nutrition assessment for additional recommendations  Meal and Snacks - Monitor and encourage adequate PO intake once medically appropriate. Will liberalize diet.   Medical Food Supplements - strawberry Ensure Plus High Protein BID once diet advances. Rationale/use for oral supplements discussed.  Vitamin and Mineral Supplements - adding Multivitamin with minerals and Iron  Coordination of Nutrition Care - GI/Surgery consult for nutrition support/diet advancement       PATIENT STATUS: 03/25/24 43 year old male admitted w/ Gastrointestinal hemorrhage. PMH includes ESRD on HD, bipolar, depression, DM 2, HTN, DL, CAD, COPD, HFpEF, chronic abdominal. Pt seen for MST 4 an dietary consult for loss of appetite and wt loss. Pt NPO earlier today for FLEXIBLE SIGMOIDOSCOPY. PTA pt reports poor appetite for the past month d/t taste changes, early satiety and difficulty swallowing. Pt agreeable to try strawberry Ensure Plus HP BID to maximize nutrition. Pt reports wt loss of 20 lbs this past month. Per EMR noted Wt loss of 9.4 kg, 8%, over 3 months. Significant per standards. No wasting noted. Last BM 3/24. All questions answered at this time. Will continue to monitor.       ANTHROPOMETRICS:  Ht: 188 cm (6' 2\")  Wt: 104 kg (229 lb 4.5 oz).   BMI: Body mass index is 29.44 kg/m².  IBW: 86 kg      WEIGHT HISTORY:   Weight loss: Yes, Severe Wt loss of 9.4 kg, 8%, over 3 months     Wt Readings from Last 10 Encounters:   03/23/24 104 kg (229 lb 4.5 oz)   03/16/24 111.1 kg (245 lb)   03/10/24 112 kg (246 lb 14.6 oz)   01/05/24 113.4 kg (250 lb)    01/03/24 113.4 kg (250 lb)   12/28/23 115.6 kg (254 lb 13.6 oz)   11/24/23 114.8 kg (253 lb 1.6 oz)   11/14/23 116.9 kg (257 lb 12.8 oz)   10/19/23 117.9 kg (260 lb)   08/25/23 115 kg (253 lb 8.5 oz)        NUTRITION:  Diet:       Procedures    Regular/General diet Calorie Restriction/Carb Controlled: 1800 kcal/60 grams; Fluid Restriction: 2000 ml; Is Patient on Accuchecks? No; Misc Restriction: Low Fiber/Soft      Food Allergies: No  Cultural/Ethnic/Zoroastrian Preferences Addressed: Yes    Percent Meals Eaten (last 3 days)       None            GI system review:  rectal bleeding  Last BM: 3/24  Skin and wounds: WNL    NUTRITION RELATED PHYSICAL FINDINGS:     1. Body Fat/Muscle Mass: no wasting noted     2. Fluid Accumulation: none per RN documentation     NUTRITION PRESCRIPTION: 104 kg  Calories: 0296-4245 calories/day (25-30 kcal/kg)  Protein: 104-156 grams protein/day (1.0-1.5 grams protein per kg)  Fluid: ~1 ml/kcal or per MD discretion    NUTRITION DIAGNOSIS/PROBLEM:  Inadequate energy intake related to physiological causes as evidenced by documented/reported insufficient oral intake and documented/reported unintentional weight loss      MONITOR AND EVALUATE/NUTRITION GOALS:  PO intake of 75% of meals TID - New  PO intake of 75% of oral nutrition supplement/s - New  Weight stable within 1 to 2 lbs during admission - New      MEDICATIONS:  Protonix, renvela, flomax    LABS:  Reviewed    Pt is at High nutrition risk    Dionicio Alanis  Dietetic intern

## 2024-03-25 NOTE — PLAN OF CARE
NURSING DISCHARGE NOTE    Discharged Home via Wheelchair.  Accompanied by Support staff  Belongings Taken by patient/family.    Pt a/o x4. VSS, remained afebrile. Meds given per MAR. Flex sig performed today. Tolerated regular diet. Denies any N/V. C/o back pain, norco and morphine given with relief. PIV removed prior to discharge. AVS reviewed with patient.     Problem: GASTROINTESTINAL - ADULT  Goal: Maintains adequate nutritional intake (undernourished)  Description: INTERVENTIONS:  - Monitor percentage of each meal consumed  - Identify factors contributing to decreased intake, treat as appropriate  - Assist with meals as needed  - Monitor I&O, WT and lab values  - Obtain nutritional consult as needed  - Optimize oral hygiene and moisture  - Encourage food from home; allow for food preferences  - Enhance eating environment  Outcome: Adequate for Discharge     Problem: HEMATOLOGIC - ADULT  Goal: Maintains hematologic stability  Description: INTERVENTIONS  - Assess for signs and symptoms of bleeding or hemorrhage  - Monitor labs and vital signs for trends  - Administer supportive blood products/factors, fluids and medications as ordered and appropriate  - Administer supportive blood products/factors as ordered and appropriate  Outcome: Adequate for Discharge  Goal: Free from bleeding injury  Description: (Example usage: patient with low platelets)  INTERVENTIONS:  - Avoid intramuscular injections, enemas and rectal medication administration  - Ensure safe mobilization of patient  - Hold pressure on venipuncture sites to achieve adequate hemostasis  - Assess for signs and symptoms of internal bleeding  - Monitor lab trends  - Patient is to report abnormal signs of bleeding to staff  - Avoid use of toothpicks and dental floss  - Use electric shaver for shaving  - Use soft bristle tooth brush  - Limit straining and forceful nose blowing  Outcome: Adequate for Discharge     Problem: CARDIOVASCULAR - ADULT  Goal:  Maintains optimal cardiac output and hemodynamic stability  Description: INTERVENTIONS:  - Monitor vital signs, rhythm, and trends  - Monitor for bleeding, hypotension and signs of decreased cardiac output  - Evaluate effectiveness of vasoactive medications to optimize hemodynamic stability  - Monitor arterial and/or venous puncture sites for bleeding and/or hematoma  - Assess quality of pulses, skin color and temperature  - Assess for signs of decreased coronary artery perfusion - ex. Angina  - Evaluate fluid balance, assess for edema, trend weights  Outcome: Adequate for Discharge  Goal: Absence of cardiac arrhythmias or at baseline  Description: INTERVENTIONS:  - Continuous cardiac monitoring, monitor vital signs, obtain 12 lead EKG if indicated  - Evaluate effectiveness of antiarrhythmic and heart rate control medications as ordered  - Initiate emergency measures for life threatening arrhythmias  - Monitor electrolytes and administer replacement therapy as ordered  Outcome: Adequate for Discharge

## 2024-03-26 NOTE — PAYOR COMM NOTE
Discharge Notification    Patient Name: CHU VACA  Payor: [employer]  Subscriber #: [id number]  Authorization Number: [id number]  Admit Date/Time: 3/23/2024 6:37 AM  Discharge Date/Time: 3/25/2024 4:11 PM        Patient is OPIB  Please disregard inpatient aut request.

## 2024-04-02 ENCOUNTER — HOSPITAL ENCOUNTER (INPATIENT)
Facility: HOSPITAL | Age: 44
LOS: 2 days | Discharge: HOME | End: 2024-04-04
Attending: EMERGENCY MEDICINE | Admitting: INTERNAL MEDICINE
Payer: MEDICARE

## 2024-04-02 ENCOUNTER — APPOINTMENT (OUTPATIENT)
Dept: CT IMAGING | Age: 44
End: 2024-04-02
Attending: EMERGENCY MEDICINE
Payer: MEDICARE

## 2024-04-02 ENCOUNTER — APPOINTMENT (OUTPATIENT)
Dept: GENERAL RADIOLOGY | Age: 44
End: 2024-04-02
Attending: EMERGENCY MEDICINE
Payer: MEDICARE

## 2024-04-02 DIAGNOSIS — R10.30 LOWER ABDOMINAL PAIN: ICD-10-CM

## 2024-04-02 DIAGNOSIS — R06.00 DYSPNEA, UNSPECIFIED TYPE: ICD-10-CM

## 2024-04-02 DIAGNOSIS — I16.0 HYPERTENSIVE URGENCY: Primary | ICD-10-CM

## 2024-04-02 DIAGNOSIS — Z99.2 ESRD (END STAGE RENAL DISEASE) ON DIALYSIS (HCC): ICD-10-CM

## 2024-04-02 DIAGNOSIS — N18.6 ESRD (END STAGE RENAL DISEASE) ON DIALYSIS (HCC): ICD-10-CM

## 2024-04-02 LAB
ALBUMIN SERPL-MCNC: 3.7 G/DL (ref 3.4–5)
ALBUMIN/GLOB SERPL: 0.9 {RATIO} (ref 1–2)
ALP LIVER SERPL-CCNC: 104 U/L
ALT SERPL-CCNC: 25 U/L
ANION GAP SERPL CALC-SCNC: 7 MMOL/L (ref 0–18)
AST SERPL-CCNC: 50 U/L (ref 15–37)
BASOPHILS # BLD AUTO: 0.1 X10(3) UL (ref 0–0.2)
BASOPHILS NFR BLD AUTO: 1 %
BILIRUB SERPL-MCNC: 0.6 MG/DL (ref 0.1–2)
BUN BLD-MCNC: 65 MG/DL (ref 9–23)
CALCIUM BLD-MCNC: 7.5 MG/DL (ref 8.5–10.1)
CHLORIDE SERPL-SCNC: 108 MMOL/L (ref 98–112)
CO2 SERPL-SCNC: 23 MMOL/L (ref 21–32)
CREAT BLD-MCNC: 9.43 MG/DL
EGFRCR SERPLBLD CKD-EPI 2021: 6 ML/MIN/1.73M2 (ref 60–?)
EOSINOPHIL # BLD AUTO: 0.23 X10(3) UL (ref 0–0.7)
EOSINOPHIL NFR BLD AUTO: 2.3 %
ERYTHROCYTE [DISTWIDTH] IN BLOOD BY AUTOMATED COUNT: 14.5 %
GLOBULIN PLAS-MCNC: 3.9 G/DL (ref 2.8–4.4)
GLUCOSE BLD-MCNC: 160 MG/DL (ref 70–99)
HCT VFR BLD AUTO: 32.8 %
HGB BLD-MCNC: 10.9 G/DL
IMM GRANULOCYTES # BLD AUTO: 0.03 X10(3) UL (ref 0–1)
IMM GRANULOCYTES NFR BLD: 0.3 %
LYMPHOCYTES # BLD AUTO: 1.23 X10(3) UL (ref 1–4)
LYMPHOCYTES NFR BLD AUTO: 12.4 %
MCH RBC QN AUTO: 31.4 PG (ref 26–34)
MCHC RBC AUTO-ENTMCNC: 33.2 G/DL (ref 31–37)
MCV RBC AUTO: 94.5 FL
MONOCYTES # BLD AUTO: 0.83 X10(3) UL (ref 0.1–1)
MONOCYTES NFR BLD AUTO: 8.3 %
NEUTROPHILS # BLD AUTO: 7.53 X10 (3) UL (ref 1.5–7.7)
NEUTROPHILS # BLD AUTO: 7.53 X10(3) UL (ref 1.5–7.7)
NEUTROPHILS NFR BLD AUTO: 75.7 %
OSMOLALITY SERPL CALC.SUM OF ELEC: 308 MOSM/KG (ref 275–295)
PLATELET # BLD AUTO: 247 10(3)UL (ref 150–450)
POTASSIUM SERPL-SCNC: 5.1 MMOL/L (ref 3.5–5.1)
POTASSIUM SERPL-SCNC: 5.5 MMOL/L (ref 3.5–5.1)
PROT SERPL-MCNC: 7.6 G/DL (ref 6.4–8.2)
RBC # BLD AUTO: 3.47 X10(6)UL
SODIUM SERPL-SCNC: 138 MMOL/L (ref 136–145)
WBC # BLD AUTO: 10 X10(3) UL (ref 4–11)

## 2024-04-02 PROCEDURE — 85025 COMPLETE CBC W/AUTO DIFF WBC: CPT | Performed by: EMERGENCY MEDICINE

## 2024-04-02 PROCEDURE — 99285 EMERGENCY DEPT VISIT HI MDM: CPT

## 2024-04-02 PROCEDURE — 96365 THER/PROPH/DIAG IV INF INIT: CPT

## 2024-04-02 PROCEDURE — 90935 HEMODIALYSIS ONE EVALUATION: CPT | Performed by: INTERNAL MEDICINE

## 2024-04-02 PROCEDURE — 71045 X-RAY EXAM CHEST 1 VIEW: CPT | Performed by: EMERGENCY MEDICINE

## 2024-04-02 PROCEDURE — 84132 ASSAY OF SERUM POTASSIUM: CPT | Performed by: EMERGENCY MEDICINE

## 2024-04-02 PROCEDURE — 74176 CT ABD & PELVIS W/O CONTRAST: CPT | Performed by: EMERGENCY MEDICINE

## 2024-04-02 PROCEDURE — 80053 COMPREHEN METABOLIC PANEL: CPT | Performed by: EMERGENCY MEDICINE

## 2024-04-02 PROCEDURE — 5A1D70Z PERFORMANCE OF URINARY FILTRATION, INTERMITTENT, LESS THAN 6 HOURS PER DAY: ICD-10-PCS | Performed by: STUDENT IN AN ORGANIZED HEALTH CARE EDUCATION/TRAINING PROGRAM

## 2024-04-02 PROCEDURE — 96375 TX/PRO/DX INJ NEW DRUG ADDON: CPT

## 2024-04-02 RX ORDER — MORPHINE SULFATE 4 MG/ML
4 INJECTION, SOLUTION INTRAMUSCULAR; INTRAVENOUS ONCE
Status: COMPLETED | OUTPATIENT
Start: 2024-04-02 | End: 2024-04-02

## 2024-04-02 RX ORDER — BUPROPION HYDROCHLORIDE 150 MG/1
150 TABLET ORAL DAILY
Status: DISCONTINUED | OUTPATIENT
Start: 2024-04-03 | End: 2024-04-04

## 2024-04-02 RX ORDER — KETOROLAC TROMETHAMINE 15 MG/ML
15 INJECTION, SOLUTION INTRAMUSCULAR; INTRAVENOUS ONCE
Status: COMPLETED | OUTPATIENT
Start: 2024-04-02 | End: 2024-04-02

## 2024-04-02 RX ORDER — HYDRALAZINE HYDROCHLORIDE 20 MG/ML
20 INJECTION INTRAMUSCULAR; INTRAVENOUS ONCE
Status: COMPLETED | OUTPATIENT
Start: 2024-04-02 | End: 2024-04-02

## 2024-04-02 RX ORDER — ONDANSETRON 2 MG/ML
4 INJECTION INTRAMUSCULAR; INTRAVENOUS EVERY 6 HOURS PRN
Status: DISCONTINUED | OUTPATIENT
Start: 2024-04-02 | End: 2024-04-04

## 2024-04-02 RX ORDER — CARVEDILOL 12.5 MG/1
25 TABLET ORAL 2 TIMES DAILY WITH MEALS
Status: DISCONTINUED | OUTPATIENT
Start: 2024-04-02 | End: 2024-04-04

## 2024-04-02 RX ORDER — ALBUMIN (HUMAN) 12.5 G/50ML
25 SOLUTION INTRAVENOUS
Status: DISCONTINUED | OUTPATIENT
Start: 2024-04-02 | End: 2024-04-04

## 2024-04-02 RX ORDER — CLONIDINE HYDROCHLORIDE 0.1 MG/1
0.1 TABLET ORAL 2 TIMES DAILY
Status: DISCONTINUED | OUTPATIENT
Start: 2024-04-02 | End: 2024-04-04

## 2024-04-02 RX ORDER — MELATONIN
3 NIGHTLY PRN
Status: DISCONTINUED | OUTPATIENT
Start: 2024-04-02 | End: 2024-04-04

## 2024-04-02 RX ORDER — ACETAMINOPHEN 325 MG/1
650 TABLET ORAL EVERY 4 HOURS PRN
Status: DISCONTINUED | OUTPATIENT
Start: 2024-04-02 | End: 2024-04-04

## 2024-04-02 RX ORDER — FLUOXETINE HYDROCHLORIDE 20 MG/1
40 CAPSULE ORAL DAILY
Status: DISCONTINUED | OUTPATIENT
Start: 2024-04-03 | End: 2024-04-04

## 2024-04-02 RX ORDER — ACETAMINOPHEN 500 MG
500 TABLET ORAL EVERY 4 HOURS PRN
Status: DISCONTINUED | OUTPATIENT
Start: 2024-04-02 | End: 2024-04-04

## 2024-04-02 RX ORDER — LOSARTAN POTASSIUM 100 MG/1
100 TABLET ORAL DAILY
Status: DISCONTINUED | OUTPATIENT
Start: 2024-04-03 | End: 2024-04-04

## 2024-04-02 RX ORDER — HYDROMORPHONE HYDROCHLORIDE 1 MG/ML
0.2 INJECTION, SOLUTION INTRAMUSCULAR; INTRAVENOUS; SUBCUTANEOUS EVERY 2 HOUR PRN
Status: DISCONTINUED | OUTPATIENT
Start: 2024-04-02 | End: 2024-04-03

## 2024-04-02 RX ORDER — PROCHLORPERAZINE EDISYLATE 5 MG/ML
5 INJECTION INTRAMUSCULAR; INTRAVENOUS EVERY 8 HOURS PRN
Status: DISCONTINUED | OUTPATIENT
Start: 2024-04-02 | End: 2024-04-04

## 2024-04-02 RX ORDER — NITROGLYCERIN 20 MG/100ML
INJECTION INTRAVENOUS
Status: DISCONTINUED | OUTPATIENT
Start: 2024-04-02 | End: 2024-04-03

## 2024-04-02 RX ORDER — DEXTROAMPHETAMINE SACCHARATE, AMPHETAMINE ASPARTATE, DEXTROAMPHETAMINE SULFATE AND AMPHETAMINE SULFATE 2.5; 2.5; 2.5; 2.5 MG/1; MG/1; MG/1; MG/1
10 TABLET ORAL
Status: DISCONTINUED | OUTPATIENT
Start: 2024-04-03 | End: 2024-04-04

## 2024-04-02 RX ORDER — HYDRALAZINE HYDROCHLORIDE 50 MG/1
50 TABLET, FILM COATED ORAL 2 TIMES DAILY
Status: DISCONTINUED | OUTPATIENT
Start: 2024-04-02 | End: 2024-04-04

## 2024-04-02 RX ORDER — HEPARIN SODIUM 5000 [USP'U]/ML
5000 INJECTION, SOLUTION INTRAVENOUS; SUBCUTANEOUS EVERY 8 HOURS SCHEDULED
Status: DISCONTINUED | OUTPATIENT
Start: 2024-04-02 | End: 2024-04-04

## 2024-04-02 RX ORDER — FUROSEMIDE 40 MG/1
80 TABLET ORAL
Status: DISCONTINUED | OUTPATIENT
Start: 2024-04-02 | End: 2024-04-04

## 2024-04-02 RX ORDER — HYDROMORPHONE HYDROCHLORIDE 1 MG/ML
0.4 INJECTION, SOLUTION INTRAMUSCULAR; INTRAVENOUS; SUBCUTANEOUS EVERY 2 HOUR PRN
Status: DISCONTINUED | OUTPATIENT
Start: 2024-04-02 | End: 2024-04-03

## 2024-04-02 RX ORDER — HYDROMORPHONE HYDROCHLORIDE 1 MG/ML
0.8 INJECTION, SOLUTION INTRAMUSCULAR; INTRAVENOUS; SUBCUTANEOUS EVERY 2 HOUR PRN
Status: DISCONTINUED | OUTPATIENT
Start: 2024-04-02 | End: 2024-04-03

## 2024-04-02 RX ORDER — HYDROCODONE BITARTRATE AND ACETAMINOPHEN 5; 325 MG/1; MG/1
1 TABLET ORAL EVERY 4 HOURS PRN
Status: DISCONTINUED | OUTPATIENT
Start: 2024-04-02 | End: 2024-04-04

## 2024-04-02 RX ORDER — SEVELAMER CARBONATE 800 MG/1
800 TABLET, FILM COATED ORAL
Status: DISCONTINUED | OUTPATIENT
Start: 2024-04-03 | End: 2024-04-04

## 2024-04-02 RX ORDER — NIFEDIPINE 60 MG/1
60 TABLET, EXTENDED RELEASE ORAL 2 TIMES DAILY
Status: DISCONTINUED | OUTPATIENT
Start: 2024-04-02 | End: 2024-04-04

## 2024-04-02 RX ORDER — HEPARIN SODIUM 1000 [USP'U]/ML
1.5 INJECTION, SOLUTION INTRAVENOUS; SUBCUTANEOUS
Status: DISCONTINUED | OUTPATIENT
Start: 2024-04-02 | End: 2024-04-04

## 2024-04-02 RX ORDER — HYDROCODONE BITARTRATE AND ACETAMINOPHEN 5; 325 MG/1; MG/1
2 TABLET ORAL EVERY 4 HOURS PRN
Status: DISCONTINUED | OUTPATIENT
Start: 2024-04-02 | End: 2024-04-04

## 2024-04-02 RX ORDER — LABETALOL HYDROCHLORIDE 5 MG/ML
20 INJECTION, SOLUTION INTRAVENOUS ONCE
Status: COMPLETED | OUTPATIENT
Start: 2024-04-02 | End: 2024-04-02

## 2024-04-02 RX ORDER — FENOFIBRATE 134 MG/1
134 CAPSULE ORAL NIGHTLY
Status: DISCONTINUED | OUTPATIENT
Start: 2024-04-03 | End: 2024-04-04

## 2024-04-02 NOTE — ED QUICK NOTES
Orders for admission, patient is aware of plan and ready to go upstairs. Any questions, please call ED RN Tiara at extension [phone].     Patient Covid vaccination status: Fully vaccinated     COVID Test Ordered in ED: None    COVID Suspicion at Admission: N/A    Running Infusions:    nitroGLYCERIN in dextrose 5% 5 mcg/min (04/02/24 3716)        Mental Status/LOC at time of transport: A&OX3    Other pertinent information: DULY NEPHROLOGIST MADE AWARE OF CONSULT BUT WOULD LIKE TO BE PAGED WHEN PATIENT ARRIVES PER MD GARRIDOWA score: N/A   NIH score:  N/A

## 2024-04-02 NOTE — ED PROVIDER NOTES
Patient Seen in: [facility] In [place]      History     Chief Complaint   Patient presents with    Abdomen/Flank Pain     Stated Complaint: abd pain, rectal bleeding    Subjective:   HPI    Patient's chief complaint is lower abdominal pain going on for the last day as well as recurrent rectal bleeding.  Of note he was admitted recently for rectal bleeding was found to have internal hemorrhoids.    He did not go to dialysis today and states he can be rescheduled.    States she started to feel mildly short of breath while has been in the ER for his evaluation.  No chest pain.    Objective:   Past Medical History:   Diagnosis Date    Asthma ([facility])     Attention deficit hyperactivity disorder (ADHD)     Back problem     Bipolar 1 disorder ([facility])     Cancer ([facility])     CKD (chronic kidney disease) stage 3, GFR 30-59 ml/min ([facility])     Dr Meeks    Congestive heart disease ([facility])     COPD (chronic obstructive pulmonary disease) ([facility])     Coronary atherosclerosis     Deep vein thrombosis ([facility])     at age 17 R/T cast    Depression     Diabetes ([facility])     Essential hypertension 2014    3/21 echo: severe concentric LVH with normal EF and no MR or pHTN    Extrinsic asthma, unspecified     Heart attack ([facility])     2016- angiogram- no intervention    Heart valve disease     mitral valve repair in 1994/    High blood pressure     High cholesterol     History of mitral valve repair 12/2022    Hyperlipidemia     Low back pain     tight and stiff after sweeping and mopping    LVH (left ventricular hypertrophy)     Migraines     Mixed hyperlipidemia 03/2021     HDL 38 LDL 97 VLDL 57     Monoclonal gammopathy     IgG kappa     MVP (mitral valve prolapse)     Repair 1994 at [place]; echoes as recently as 3/21 show mild or trivial MR and no stenosis    Neuropathy     Osteoarthritis     hip ,knees    Pneumonia due to organism     Pulmonary embolism ([facility]) 01/2023    Renal disorder     TIA (transient ischemic attack)  2021    Initial history of left-sided weakness and slurred speech. (+) cocaine. MRI of the brain, CT angiogram of the head and neck, and 2D echo are all unremarkable.     TMJ (dislocation of temporomandibular joint)     Troponin level elevated 2021    Trop 60 60 47 with TIA and no CP: Lexiscan negative with EF 51              Past Surgical History:   Procedure Laterality Date    COLONOSCOPY N/A 2023    Procedure: COLONOSCOPY;  Surgeon: Heath Vu MD;  Location:  ENDOSCOPY    COLONOSCOPY N/A 2023    Procedure: COLONOSCOPY with cold snare polypectomy and forcep polypectomy;  Surgeon: Ousmane Suarez MD;  Location:  ENDOSCOPY    COLONOSCOPY & POLYPECTOMY  2019    EGD  2019    Duodenitis. Biopsied. EUS for weight loss was negative    HEART SURGERY      HERNIA SURGERY  2022    Dr Barnes    LAMINECTOMY,>2 SGMT,LUMBAR  2018    L4-L5 Decomp Discectomy ROEM L4-L5    MITRALPLASTY W CP BYPASS      [facility]: Repair    REPAIR ROTATOR CUFF,CHRONIC Left     torn and had a ruptured bicep    VALVE REPAIR      mitral valve                Social History     Socioeconomic History    Marital status:    Tobacco Use    Smoking status: Former     Packs/day: 1.00     Years: 27.00     Additional pack years: 0.00     Total pack years: 27.00     Types: Cigarettes     Quit date: 2022     Years since quittin.0    Smokeless tobacco: Never   Vaping Use    Vaping Use: Never used   Substance and Sexual Activity    Alcohol use: No    Drug use: No     Social Determinants of Health     Food Insecurity: No Food Insecurity (3/23/2024)    Food Insecurity     Food Insecurity: Never true   Transportation Needs: No Transportation Needs (3/23/2024)    Transportation Needs     Lack of Transportation: No   Housing Stability: Low Risk  (3/23/2024)    Housing Stability     Housing Instability: No              Review of Systems    Positive for stated complaint: abd pain, rectal bleeding  Other systems  are as noted in HPI.  Constitutional and vital signs reviewed.      All other systems reviewed and negative except as noted above.    Physical Exam     ED Triage Vitals   BP 04/02/24 1351 (!) 185/117   Pulse 04/02/24 1351 103   Resp 04/02/24 1351 22   Temp 04/02/24 1351 98.6 °F (37 °C)   Temp src 04/02/24 1351 Temporal   SpO2 04/02/24 1351 97 %   O2 Device 04/02/24 1533 None (Room air)       Current:BP (!) 175/124   Pulse 99   Temp 98.4 °F (36.9 °C) (Temporal)   Resp 24   Ht 188 cm (6' 2\")   Wt 108.9 kg   SpO2 94%   BMI 30.81 kg/m²         Physical Exam    Physical Exam   Constitutional: Awake, alert, well appearing  Head: Normocephalic and atraumatic.   Eyes: Conjunctivae are normal. Pupils are equal, round, and reactive to light.   Neck: Normal range of motion. Neck supple.   Cardiovascular: Normal rate, regular rhythm  Pulmonary/Chest: Mildly tachypneic, crackles bilateral bases  Abdominal: Soft. Bowel sounds are normal.   Neurological: Pt is alert and oriented to person, place, and time. no cranial nerve deficits  Skin: Skin is warm and dry.    Belly benign multiple exams    ED Course     Labs Reviewed   COMP METABOLIC PANEL (14) - Abnormal; Notable for the following components:       Result Value    Glucose 160 (*)     Potassium 5.5 (*)     BUN 65 (*)     Creatinine 9.43 (*)     Calcium, Total 7.5 (*)     Calculated Osmolality 308 (*)     eGFR-Cr 6 (*)     AST 50 (*)     A/G Ratio 0.9 (*)     All other components within normal limits   CBC W/ DIFFERENTIAL - Abnormal; Notable for the following components:    RBC 3.47 (*)     HGB 10.9 (*)     HCT 32.8 (*)     All other components within normal limits   CBC WITH DIFFERENTIAL WITH PLATELET    Narrative:     The following orders were created for panel order CBC With Differential With Platelet.  Procedure                               Abnormality         Status                     ---------                               -----------         ------                      CBC W/ DIFFERENTIAL[[id number]]          Abnormal            Final result                 Please view results for these tests on the individual orders.   POTASSIUM   RAINBOW DRAW LAVENDER   RAINBOW DRAW LIGHT GREEN   RAINBOW DRAW BLUE             XR CHEST AP PORTABLE  (CPT=71045)    Result Date: 4/2/2024  CONCLUSION:  Mild pulmonary vascular congestion.  There is a small right pleural effusion that is likely stable.   LOCATION:  [place] [address]      Dictated by (CST): Ryan Hernández MD on 4/02/2024 at 5:01 PM     Finalized by (CST): Ryan Hernández MD on 4/02/2024 at 5:02 PM       CT ABDOMEN+PELVIS(CPT=74176)    Result Date: 4/2/2024  CONCLUSION:   Diverticulosis of the colon without evidence of acute diverticulitis.  The appendix is normal.  No evidence of an acute inflammatory process.  Small right pleural effusion with atelectasis.   LOCATION:  [id number]   Dictated by (CST): Ryan Hernández MD on 4/02/2024 at 4:18 PM     Finalized by (CST): Ryan Hernández MD on 4/02/2024 at 4:21 PM                 MDM          Differential diagnoses considered: Diverticulitis, diverticulosis, constipation, hypertensive urgency hypertensive urgency, pulmonary edema      -Unclear etiology as abdominal pain, CT scan and abdominal exam benign.    -Patient remained persistently hypertensive while here and has become increasingly short of breath.  Has not needed oxygen but has crackles on exam and chest x-ray seems overloaded.  -Has required multiple doses of IV antihypertensives, I am going to put him on a nitro drip and he will need admission for dialysis.    -Discussed with Dr. Seipp, nephrology, request to be contacted once the patient arrives to the the [facility] hospital.    Patient will be admitted primarily to the \[facility] [place] [facility]\""ist.    Care has been discussed with the admitting physician.      *My independent interpretation of radiographs: Vascular congestion    *Discussion of ongoing management of this patient's care  included: Nephrology, admitting service  *Comorbidities contributing to the complexity of decision making: ESRD on dialysis   *External charts reviewed: Matt operative report from GI service on 3/25/2024 reviewed.  He had his flex sig done and it showed internal hemorrhoids  *Additional sources of history: n/a    Shared decision making was done by: patient, myself.        Admission disposition: 4/2/2024  4:48 PM               A total of 45 minutes of critical care time (exclusive of billable procedures) was administered to manage the patient's unstable vital signs, respiratory instability, and cardiovascular instability due to his hypertensive urgency necessitating multiple IV hypertensives as well as a nitro drip, need for urgent dialysis tonight.  This involved direct patient intervention, complex decision making, and/or extensive discussions with the patient, family, and clinical staff.                           Medical Decision Making      Disposition and Plan     Clinical Impression:  1. Hypertensive urgency    2. ESRD (end stage renal disease) on dialysis (HCC)    3. Dyspnea, unspecified type    4. Lower abdominal pain         Disposition:  Admit  4/2/2024  4:48 pm    Follow-up:  No follow-up provider specified.        Medications Prescribed:  Current Discharge Medication List                            Hospital Problems       Present on Admission  Date Reviewed: 1/3/2024            ICD-10-CM Noted POA    * (Principal) Hypertensive urgency I16.0 11/29/2020 Unknown                    [Negative] : Heme/Lymph

## 2024-04-02 NOTE — ED INITIAL ASSESSMENT (HPI)
Pt to ed with c/o rectal bleeding and lower abdominal pain x 2 days. Blood is bright red. PT was recently admitted for same sx

## 2024-04-02 NOTE — ED QUICK NOTES
Prisca RN aware of patient departure from PED at this time. RN aware of patient elevated BP readings and the need for nephrologist is to be paged when patient arrives for dialysis today per ED MD. Medics at bedside, pt remains alert and oriented.

## 2024-04-03 LAB
ANION GAP SERPL CALC-SCNC: 8 MMOL/L (ref 0–18)
BASOPHILS # BLD AUTO: 0.11 X10(3) UL (ref 0–0.2)
BASOPHILS NFR BLD AUTO: 1.2 %
BUN BLD-MCNC: 27 MG/DL (ref 9–23)
CALCIUM BLD-MCNC: 8.9 MG/DL (ref 8.5–10.1)
CHLORIDE SERPL-SCNC: 106 MMOL/L (ref 98–112)
CO2 SERPL-SCNC: 23 MMOL/L (ref 21–32)
CREAT BLD-MCNC: 5.41 MG/DL
EGFRCR SERPLBLD CKD-EPI 2021: 12 ML/MIN/1.73M2 (ref 60–?)
EOSINOPHIL # BLD AUTO: 0.3 X10(3) UL (ref 0–0.7)
EOSINOPHIL NFR BLD AUTO: 3.4 %
ERYTHROCYTE [DISTWIDTH] IN BLOOD BY AUTOMATED COUNT: 14.6 %
GLUCOSE BLD-MCNC: 159 MG/DL (ref 70–99)
HCT VFR BLD AUTO: 34.6 %
HGB BLD-MCNC: 11.6 G/DL
IMM GRANULOCYTES # BLD AUTO: 0.03 X10(3) UL (ref 0–1)
IMM GRANULOCYTES NFR BLD: 0.3 %
LYMPHOCYTES # BLD AUTO: 0.88 X10(3) UL (ref 1–4)
LYMPHOCYTES NFR BLD AUTO: 9.8 %
MCH RBC QN AUTO: 31.2 PG (ref 26–34)
MCHC RBC AUTO-ENTMCNC: 33.5 G/DL (ref 31–37)
MCV RBC AUTO: 93 FL
MONOCYTES # BLD AUTO: 0.92 X10(3) UL (ref 0.1–1)
MONOCYTES NFR BLD AUTO: 10.3 %
NEUTROPHILS # BLD AUTO: 6.7 X10 (3) UL (ref 1.5–7.7)
NEUTROPHILS # BLD AUTO: 6.7 X10(3) UL (ref 1.5–7.7)
NEUTROPHILS NFR BLD AUTO: 75 %
OSMOLALITY SERPL CALC.SUM OF ELEC: 292 MOSM/KG (ref 275–295)
PLATELET # BLD AUTO: 231 10(3)UL (ref 150–450)
POTASSIUM SERPL-SCNC: 4.7 MMOL/L (ref 3.5–5.1)
RBC # BLD AUTO: 3.72 X10(6)UL
SODIUM SERPL-SCNC: 137 MMOL/L (ref 136–145)
WBC # BLD AUTO: 8.9 X10(3) UL (ref 4–11)

## 2024-04-03 PROCEDURE — 94640 AIRWAY INHALATION TREATMENT: CPT

## 2024-04-03 PROCEDURE — 80048 BASIC METABOLIC PNL TOTAL CA: CPT | Performed by: INTERNAL MEDICINE

## 2024-04-03 PROCEDURE — 85025 COMPLETE CBC W/AUTO DIFF WBC: CPT | Performed by: INTERNAL MEDICINE

## 2024-04-03 RX ORDER — DICYCLOMINE HYDROCHLORIDE 10 MG/1
10 CAPSULE ORAL 3 TIMES DAILY PRN
Qty: 20 CAPSULE | Refills: 0 | Status: SHIPPED | OUTPATIENT
Start: 2024-04-03

## 2024-04-03 RX ORDER — DICYCLOMINE HYDROCHLORIDE 10 MG/1
10 CAPSULE ORAL 3 TIMES DAILY PRN
Status: COMPLETED | OUTPATIENT
Start: 2024-04-03 | End: 2024-04-04

## 2024-04-03 RX ORDER — HYDRALAZINE HYDROCHLORIDE 20 MG/ML
10 INJECTION INTRAMUSCULAR; INTRAVENOUS EVERY 6 HOURS PRN
Status: DISCONTINUED | OUTPATIENT
Start: 2024-04-03 | End: 2024-04-04

## 2024-04-03 NOTE — CONSULTS
[facility]  Report of Consultation    Godwin Fonseca Patient Status:  Inpatient    1980 MRN [id number]   Location [facility] 7NE-A Attending Jhonny Rebolledo MD   Hosp Day # 1 PCP Prieto Novak MD     Reason for Consultation:  Rectal bleeding    Godwin Fonseca is a a(n) 43 year old male.     Exctensive w/u in the past.  Has chronic rectal bleeding and intermittent lower abd pain.  Had cscope w/ Dr Vu and then by Dr Suarez last year and then 3/25/24 flex sig w/ Dr Delgado showing hemorrhoids and no bleeding through to the sigmoid    States chronic constipation improves w/ stool softeners and miralax, states he is happy w/ this regimen    States rectal bleeding unchnaged    States no exac/remitting factors for the episodes of pain in lower abd, he wants to try bentyl    States no n/v, f/c/s, change in the llq intermittent pain or bleeding symptoms.    States now having back pain chronically, on tramadol for this, risk constipation discussed w/ pt    Pt does not want endoscopy or JACOB exam, wants to try bentyl    He wants to eat    Risk endoscopy reviewed w/ him    States no bleeding since yesterday. 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 (HCC)    Chronic non-seasonal allergic rhinitis    Chronic sinusitis, unspecified location    ADHD (attention deficit hyperactivity disorder), inattentive type    Bipolar depression (HCC)    Essential hypertension    Mild persistent asthma without complication (HCC)    CKD (chronic kidney disease) stage 3, GFR 30-59 ml/min (HCC)    Self-inflicted injury    Bipolar disorder in partial remission, most recent episode unspecified type (HCC)    Family history of prostate cancer    Fatigue, unspecified type    Alkaline phosphatase elevation    Hyperlipidemia, mixed    Low serum HDL    Spinal stenosis of lumbar  region with neurogenic claudication    Prolapsed lumbar disc    Status post lumbar surgery    Cauda equina syndrome (HCC)    Lumbar disc prolapse with compression radiculopathy    Syncope and collapse    Elevated troponin    Hypokalemia    Orthostatic hypotension    Hypertension, unspecified type    Weight loss    Chest pain, unspecified type    History of mitral valve stenosis    Acute pericarditis, unspecified type (HCC)    Cervical radiculopathy    Elevated blood protein    IgG monoclonal protein disorder    MGUS (monoclonal gammopathy of unknown significance)    Hypertensive urgency    Bipolar 2 disorder (HCC)    Employment problem    Stress    Anxiety disorder, unspecified type    Weakness of left lower extremity    Rectal bleeding    Paresthesia of foot, bilateral    Obesity (BMI 30-39.9)    TIA (transient ischemic attack)    TMJ dysfunction    Inverted papilloma of nasal cavity    Type 2 diabetes mellitus with stage 3b chronic kidney disease, without long-term current use of insulin (HCC)    Acute kidney injury (HCC)    Metabolic acidosis    Hyperglycemia    Chronic kidney disease, unspecified CKD stage    Abdominal distension    SOB (shortness of breath)    Hypertensive crisis    Acute on chronic congestive heart failure, unspecified heart failure type (HCC)    Acute congestive heart failure (HCC)    Acute congestive heart failure, unspecified heart failure type (HCC)    Acute on chronic diastolic congestive heart failure (HCC)    Stage 4 chronic kidney disease (HCC)    ROBERT (acute kidney injury) (HCC)    Abdominal pain, left lower quadrant    Hypertensive emergency    Acute chest pain    Acute on chronic renal insufficiency    Chronic abdominal pain    Nonintractable headache, unspecified chronicity pattern, unspecified headache type    Chronic right shoulder pain    HCAP (healthcare-associated pneumonia)    Acute intractable headache, unspecified headache type    ESRD (end stage renal disease) on dialysis  (HCC)    Diarrhea, unspecified type    Primary hypertension    Dyspnea, unspecified type    Abdominal pain, acute    ESRD on dialysis (HCC)    Chronic renal failure, unspecified CKD stage    Fluid overload    ESRD needing dialysis (HCC)    COVID-19 virus infection    Hypotension, unspecified hypotension type    Anemia    Acute renal failure (ARF) (Formerly [facility])    Neck pain    Acute nonintractable headache, unspecified headache type    GI bleed    Gastrointestinal hemorrhage, unspecified gastrointestinal hemorrhage type    Chronic kidney disease with end stage renal failure on dialysis (HCC)    Lower abdominal pain         History:  Past Medical History:   Diagnosis Date    Asthma (Formerly [facility])     Attention deficit hyperactivity disorder (ADHD)     Back problem     Bipolar 1 disorder (HCC)     Cancer (HCC)     CKD (chronic kidney disease) stage 3, GFR 30-59 ml/min (Formerly [facility])     Dr Meeks    Congestive heart disease (Formerly [facility])     COPD (chronic obstructive pulmonary disease) (Formerly [facility])     Coronary atherosclerosis     Deep vein thrombosis (Formerly [facility])     at age 17 R/T cast    Depression     Diabetes (Formerly [facility])     Essential hypertension 2014    3/21 echo: severe concentric LVH with normal EF and no MR or pHTN    Extrinsic asthma, unspecified     Heart attack (Formerly [facility])     2016- angiogram- no intervention    Heart valve disease     mitral valve repair in 1994/    High blood pressure     High cholesterol     History of mitral valve repair 12/2022    Hyperlipidemia     Low back pain     tight and stiff after sweeping and mopping    LVH (left ventricular hypertrophy)     Migraines     Mixed hyperlipidemia 03/2021     HDL 38 LDL 97 VLDL 57     Monoclonal gammopathy     IgG kappa     MVP (mitral valve prolapse)     Repair 1994 at [place]; echoes as recently as 3/21 show mild or trivial MR and no stenosis    Neuropathy     Osteoarthritis     hip ,knees    Pneumonia due to organism     Pulmonary embolism (HCC) 01/2023    Renal disorder     TIA (transient  ischemic attack) 03/2021    Initial history of left-sided weakness and slurred speech. (+) cocaine. MRI of the brain, CT angiogram of the head and neck, and 2D echo are all unremarkable.     TMJ (dislocation of temporomandibular joint)     Troponin level elevated 03/2021    Trop 60 60 47 with TIA and no CP: Lexiscan negative with EF 51       Past Surgical History:   Procedure Laterality Date    COLONOSCOPY N/A 03/26/2023    Procedure: COLONOSCOPY;  Surgeon: Heath Vu MD;  Location:  ENDOSCOPY    COLONOSCOPY N/A 12/30/2023    Procedure: COLONOSCOPY with cold snare polypectomy and forcep polypectomy;  Surgeon: Ousmane Suarez MD;  Location:  ENDOSCOPY    COLONOSCOPY & POLYPECTOMY  2019    EGD  2019    Duodenitis. Biopsied. EUS for weight loss was negative    HEART SURGERY      HERNIA SURGERY  08/17/2022    Dr Barnes    LAMINECTOMY,>2 SGMT,LUMBAR  09/06/2018    L4-L5 Decomp Discectomy ROEM L4-L5    MITRALPLASTY W CP BYPASS  1994    [place]: Repair    REPAIR ROTATOR CUFF,CHRONIC Left     torn and had a ruptured bicep    VALVE REPAIR  1994    mitral valve       Family History   Problem Relation Age of Onset    Hypertension Father     Alcohol and Other Disorders Associated Father     Substance Abuse Father         cocaine    Dementia Father     Cancer Father         lung    Diabetes Mother     Cancer Mother         multiple myeloma    Hypertension Mother     Anxiety Maternal Aunt     Depression Maternal Aunt     Anxiety Maternal Aunt     Depression Maternal Aunt     Bipolar Disorder Maternal Aunt     Diabetes Maternal Grandmother     Hypertension Maternal Grandmother     Cancer Maternal Grandfather         stomach cancer    Diabetes Maternal Grandfather     Hypertension Maternal Grandfather     Alcohol and Other Disorders Associated Maternal Grandfather     Hypertension Paternal Grandmother     Hypertension Paternal Grandfather     Cancer Sister         uterine and ovarian    Hypertension Sister     Cancer  Maternal Uncle         lung    Cancer Paternal Aunt         throat        reports that he quit smoking about 2 years ago. His smoking use included cigarettes. He has a 27 pack-year smoking history. He has never used smokeless tobacco. He reports that he does not drink alcohol and does not use drugs.    Allergies:  Allergies   Allergen Reactions    Hydrochlorothiazide RASH and HIVES       Medications:  Current Facility-Administered Medications   Medication Dose Route Frequency    hydrALAzine (Apresoline) 20 mg/mL injection 10 mg  10 mg Intravenous Q6H PRN    heparin (Porcine) 5000 UNIT/ML injection 5,000 Units  5,000 Units Subcutaneous Q8H MARTHA    acetaminophen (Tylenol Extra Strength) tab 500 mg  500 mg Oral Q4H PRN    acetaminophen (Tylenol) tab 650 mg  650 mg Oral Q4H PRN    Or    HYDROcodone-acetaminophen (Norco) 5-325 MG per tab 1 tablet  1 tablet Oral Q4H PRN    Or    HYDROcodone-acetaminophen (Norco) 5-325 MG per tab 2 tablet  2 tablet Oral Q4H PRN    melatonin tab 3 mg  3 mg Oral Nightly PRN    ondansetron (Zofran) 4 MG/2ML injection 4 mg  4 mg Intravenous Q6H PRN    prochlorperazine (Compazine) 10 MG/2ML injection 5 mg  5 mg Intravenous Q8H PRN    sodium chloride 0.9 % IV bolus 100 mL  100 mL Intravenous Q30 Min PRN    And    albumin human (Albumin) 25% injection 25 g  25 g Intravenous PRN Dialysis    heparin (Porcine) 1000 UNIT/ML injection 1,500 Units  1.5 mL Intracatheter PRN Dialysis    ARIPiprazole (Abilify) tab 15 mg  15 mg Oral Daily    buPROPion ER (Wellbutrin XL) 24 hr tab 150 mg  150 mg Oral Daily    carvedilol (Coreg) tab 25 mg  25 mg Oral BID with meals    cloNIDine (Catapres) tab 0.1 mg  0.1 mg Oral BID    fenofibrate micronized (Lofibra) cap 134 mg  134 mg Oral Nightly    FLUoxetine (PROzac) cap 40 mg  40 mg Oral Daily    furosemide (Lasix) tab 80 mg  80 mg Oral BID (Diuretic)    hydrALAZINE (Apresoline) tab 50 mg  50 mg Oral BID    lamoTRIgine (LaMICtal) tab 150 mg  150 mg Oral Daily     amphetamine-dextroamphetamine (Adderall) tab 10 mg  10 mg Oral BID AC    losartan (Cozaar) tab 100 mg  100 mg Oral Daily    NIFEdipine ER (Procardia-XL) 24 hr tab 60 mg  60 mg Oral BID    sevelamer carbonate (Renvela) tab 800 mg  800 mg Oral TID CC    umeclidinium bromide (Incruse Ellipta) 62.5 MCG/ACT inhaler 1 puff  1 puff Inhalation Daily    HYDROmorphone (Dilaudid) 1 MG/ML injection 0.2 mg  0.2 mg Intravenous Q2H PRN    Or    HYDROmorphone (Dilaudid) 1 MG/ML injection 0.4 mg  0.4 mg Intravenous Q2H PRN    Or    HYDROmorphone (Dilaudid) 1 MG/ML injection 0.8 mg  0.8 mg Intravenous Q2H PRN       Current Facility-Administered Medications on File Prior to Encounter   Medication Dose Route Frequency Provider Last Rate Last Admin    [COMPLETED] ondansetron (Zofran) 4 MG/2ML injection 4 mg  4 mg Intravenous Once Livia Marx MD   4 mg at 24 0749    [COMPLETED] pantoprazole (Protonix) 40 mg in sodium chloride 0.9% PF 10 mL IV push  40 mg Intravenous Once Livia Marx MD   40 mg at 24 0749    [] sodium chloride 0.9 % IV bolus 100 mL  100 mL Intravenous Q30 Min PRN Lenka Bond MD        And    [] albumin human (Albumin) 25% injection 25 g  25 g Intravenous PRN Dialysis Lenka Bond MD        [COMPLETED] iopamidol 76% (ISOVUE-370) injection for power injector  100 mL Intravenous ONCE PRN Ria Gutierrez MD   100 mL at 24 1659    [COMPLETED] hydrALAzine (Apresoline) 20 mg/mL injection 5 mg  5 mg Intravenous Once Gama Ray, DO   5 mg at 24 0109    [COMPLETED] cloNIDine (Catapres) tab 0.1 mg  0.1 mg Oral Once Gama Ray, DO   0.1 mg at 24 0209    [COMPLETED] HYDROmorphone (Dilaudid) 1 MG/ML injection 0.5 mg  0.5 mg Intravenous Once Gama Ray, DO   0.5 mg at 24 0236    [COMPLETED] hydrALAzine (Apresoline) 20 mg/mL injection 10 mg  10 mg Intravenous Once Gama Ray DO   10 mg at 24 0316    [COMPLETED] HYDROmorphone (Dilaudid) 1 MG/ML injection 1 mg  1  mg Intravenous Once Gama Ray DO   1 mg at 24 0348    [COMPLETED] HYDROmorphone (Dilaudid) 1 MG/ML injection 1 mg  1 mg Intravenous Once Terry Lew MD   1 mg at 24 1320    [COMPLETED] labetalol (Trandate) 5 mg/mL injection 20 mg  20 mg Intravenous Once Terry Lew MD   20 mg at 24 1320    [COMPLETED] hydrALAzine (Apresoline) 20 mg/mL injection 20 mg  20 mg Intravenous Once Terry Lew MD   20 mg at 24 1417    [COMPLETED] labetalol (Trandate) 5 mg/mL injection 40 mg  40 mg Intravenous Once Terry Lew MD   40 mg at 24 1449    [COMPLETED] heparin (Porcine) 1000 UNIT/ML injection 1,500 Units  1.5 mL Intracatheter Once Samaria Nava MD   1,500 Units at 24 2100    [COMPLETED] labetalol (Trandate) 5 mg/mL injection 10 mg  10 mg Intravenous Once Jhonny Rebolledo MD   10 mg at 24 0357    [COMPLETED] butalbital-acetaminophen-caffeine (Fioricet) -40 MG per tab 1 tablet  1 tablet Oral Once Jhonny Rebolledo MD   1 tablet at 24 0245    [] sodium chloride 0.9 % IV bolus 100 mL  100 mL Intravenous Q30 Min PRN Samantha Muñoz DO        And    [] albumin human (Albumin) 25% injection 25 g  25 g Intravenous PRN Dialysis Samantha Muñoz DO        [COMPLETED] heparin (Porcine) 1000 UNIT/ML injection 2,000 Units  2,000 Units Intravenous Once Samantha Muñoz DO   2,000 Units at 24 1820    [COMPLETED] hydrALAzine (Apresoline) 20 mg/mL injection 10 mg  10 mg Intravenous Once Jhonny Rebolledo MD   10 mg at 24 2225     Current Outpatient Medications on File Prior to Encounter   Medication Sig Dispense Refill    hydrocortisone (ANUSOL-HC) 25 MG Rectal Suppos 1 tab per rectum at night x 1week 7 suppository 4    isosorbide mononitrate ER 30 MG Oral Tablet 24 Hr Take 1 tablet (30 mg total) by mouth daily. Hold if systolic blood pressure <130      tamsulosin 0.4 MG Oral Cap Take 1 capsule (0.4 mg total) by mouth daily.      Lisdexamfetamine Dimesylate  60 MG Oral Cap Take 1 capsule (60 mg total) by mouth every morning.      buPROPion  MG Oral Tablet 24 Hr Take 1 tablet (150 mg total) by mouth daily.      ARIPiprazole 15 MG Oral Tab Take 1 tablet (15 mg total) by mouth daily.      bisacodyl 5 MG Oral Tab EC Take 3 tablets (15 mg total) by mouth daily. 30 tablet 0    polyethylene glycol, PEG 3350, 17 g Oral Powd Pack Take 17 g by mouth in the morning and 17 g before bedtime. 60 packet 0    lamoTRIgine (LAMICTAL) 150 MG Oral Tab Take 1 tablet (150 mg total) by mouth daily. 7 tablet 0    FLUoxetine HCl 40 MG Oral Cap Take 1 capsule (40 mg total) by mouth daily. 7 capsule 0    potassium chloride 20 MEQ Oral Tab CR Take 1 tablet (20 mEq total) by mouth daily.      RENVELA 800 MG Oral Tab Take 1 tablet (800 mg total) by mouth 3 (three) times daily with meals.      losartan 100 MG Oral Tab Take 1 tablet (100 mg total) by mouth daily. Hold if systolic blood pressure <130      furosemide 80 MG Oral Tab Take 1 tablet (80 mg total) by mouth 2 (two) times daily. Hold if systolic blood pressure <130      hydrALAZINE 50 MG Oral Tab Take 1 tablet (50 mg total) by mouth in the morning and 1 tablet (50 mg total) before bedtime. Hold if systolic blood pressure <130. 30 tablet 0    NIFEdipine ER 60 MG Oral Tablet 24 Hr Take 1 tablet (60 mg total) by mouth in the morning and 1 tablet (60 mg total) before bedtime. Hold if systolic blood pressure <130.      cloNIDine 0.1 MG Oral Tab Take 1 tablet (0.1 mg total) by mouth 2 (two) times daily. 60 tablet 0    ergocalciferol 1.25 MG (41217 UT) Oral Cap Take 1 capsule (50,000 Units total) by mouth Every Monday.      carvedilol 25 MG Oral Tab Take 1 tablet (25 mg total) by mouth in the morning and 1 tablet (25 mg total) in the evening. Take with meals. 60 tablet 6    Tiotropium Bromide Monohydrate (SPIRIVA HANDIHALER) 18 MCG Inhalation Cap 1 capsule (18 mcg total) daily.      albuterol 108 (90 Base) MCG/ACT Inhalation Aero Soln albuterol  sulfate HFA 90 mcg/actuation aerosol inhaler   INHALE 1 TO 2 PUFFS BY MOUTH EVERY 4-6 HOURS AS NEEDED FOR COUGH OR WHEEZING      Fenofibrate 134 MG Oral Cap Take 1 capsule by mouth nightly. 90 capsule 1    acetaminophen 500 MG Oral Tab Take 1 tablet (500 mg total) by mouth every 6 (six) hours as needed for Pain.      Fluticasone Propionate 50 MCG/ACT Nasal Suspension SPRAY ONCE INTO EACH NOSTRIL BID PRN 15.8 mL 0    benzonatate 100 MG Oral Cap Take 1 capsule (100 mg total) by mouth 3 (three) times daily as needed for cough. 30 capsule 0         Review of Systems:  GENERAL HEALTH: otherwise feels well, no change in energy level  SKIN: denies any unusual skin lesions or rashes  EYES: denies new visual complaints or deficits   RESPIRATORY: denies new/changing shortness of breath    CARDIOVASCULAR: denies chest pain or RAMSAY; no palpitations   GI: as above  GENITAL//GYN: on hd  MUSCULOSKELETAL: chronci back pain he states  NEURO: denies new sensory or motor complaint  PSYCHE: mood is unchanged a except as stated above  HEMATOLOGY: denies bruising or excessive bleeding except as stated  ENDOCRINE: denies unexpected wt gain or wt loss except as stated  ALLERGY/IMM.:medication allergies as above        PHYSICAL EXAM   /82 (BP Location: Left arm)   Pulse 82   Temp 97.5 °F (36.4 °C) (Oral)   Resp 15   Ht 6' 2\" (1.88 m)   Wt 240 lb (108.9 kg)   SpO2 93%   BMI 30.81 kg/m²     GENERAL: well developed, well nourished, in no apparent distress, comfortable appearing  HEENT: normocephalic, mucous membranes moist.  EYES: eomi    NECK:non tender, supple  RESPIRATORY: clear to percussion and auscultation  CARDIOVASCULAR: reg rate     ABDOMEN: normal, active bowel sounds, soft, nondistended , minimal tenderness w/ deep palpation but abd still soft and no dory/guard/rigidity in all quandrats  RECTAL: pt refused exam    EXTREMITIES: no le edema  NEURO:  Oriented x 3   BACK: no CVA tenderness  PSYCH: appropriate for the  exam          LAB/IMAGING RESULTS:        Recent Labs   Lab 04/02/24  1409 04/02/24  1735 04/03/24  0521   *  --  159*   BUN 65*  --  27*   CREATSERUM 9.43*  --  5.41*   CA 7.5*  --  8.9     --  137   K 5.5* 5.1 4.7     --  106   CO2 23.0  --  23.0       Recent Labs   Lab 04/02/24  1409 04/03/24  0521   RBC 3.47* 3.72*   HGB 10.9* 11.6*   HCT 32.8* 34.6*   MCV 94.5 93.0   MCH 31.4 31.2   MCHC 33.2 33.5   RDW 14.5 14.6   NEPRELIM 7.53 6.70   WBC 10.0 8.9   .0 231.0       Recent Labs   Lab 04/02/24  1409   ALKPHO 104   BILT 0.6   TP 7.6   ALB 3.7   ALT 25   AST 50*       No results for input(s): \"JAMIL\", \"LIP\" in the last 168 hours.        ASSESSMENT AND PLAN:   1 rectal bleeding, intermittent llq pain , constipation -- risk of med induced constipation reviewed.  Has chronic hemorrhodis, states bleeding and other symptoms are unchanged from baseline    He has not had bleeding for over 24 hrs and no bm for over 24 hrs    We discussed options, he does not want other eval, reviewed risk of false (-) labs, imaging (ct w/out iv contrast), endoscopy, laverne etc w/ him.    He did want trial of bentyl. He is not sure if he wants surg c/s for hemorrhoids      Multiple options for how to proceed were discussed in detail with patient, the patient was agreeable to the following plan ...      --he will decide if he wants to see surg, advised he f/u w/ surgery if he wants this  --he wants to cont prn miralax/softeners as outpt  --can try 3 doses bentyl prn to see if helps w/ his abd pain but currently he is complaining more of back pain than abd pain  --he wants to eat          Will sign off for now, pls call w/ questions      pt demonstrated understanding of risks of morbidity/mortality if diagnoses and/or treatments were delayed balanced against risks of further investigation and/or treatment.       --the pt demonstrated understanding of the results and recommendations and options and the  risk/benefit/limitations and alternatives to the plan.  All of their questions and concerns were addressed and were agreeable to the plan as listed      Bakari Noguera MD  4/3/2024  1:18 PM

## 2024-04-03 NOTE — PLAN OF CARE
NURSING ADMISSION NOTE    Patient admitted via Cart  Oriented to room.  Safety precautions initiated.  Bed in low position.  Call light in reach.    Assumed care at approximately 2000.   A & O x 4.   4 L O2.   NSR/ ST on tele.   C/o moderate to severe abdominal pain, managed with PRN Norco and dilaudid.   BP elevated overnight, home BP meds resumed and PRN hydralazine given,   Nitroglycerin gtt infusing,   Dialysis completed overnight. 4 L out.   Call light within reach.   Patient updated on plan of care.

## 2024-04-04 VITALS
RESPIRATION RATE: 22 BRPM | DIASTOLIC BLOOD PRESSURE: 79 MMHG | OXYGEN SATURATION: 97 % | HEIGHT: 74 IN | SYSTOLIC BLOOD PRESSURE: 135 MMHG | BODY MASS INDEX: 30.8 KG/M2 | HEART RATE: 88 BPM | WEIGHT: 240 LBS | TEMPERATURE: 98 F

## 2024-04-04 LAB
ANION GAP SERPL CALC-SCNC: 7 MMOL/L (ref 0–18)
BASOPHILS # BLD AUTO: 0.07 X10(3) UL (ref 0–0.2)
BASOPHILS NFR BLD AUTO: 1.2 %
BUN BLD-MCNC: 43 MG/DL (ref 9–23)
CALCIUM BLD-MCNC: 8.8 MG/DL (ref 8.5–10.1)
CHLORIDE SERPL-SCNC: 108 MMOL/L (ref 98–112)
CO2 SERPL-SCNC: 23 MMOL/L (ref 21–32)
CREAT BLD-MCNC: 7.37 MG/DL
EGFRCR SERPLBLD CKD-EPI 2021: 9 ML/MIN/1.73M2 (ref 60–?)
EOSINOPHIL # BLD AUTO: 0.38 X10(3) UL (ref 0–0.7)
EOSINOPHIL NFR BLD AUTO: 6.6 %
ERYTHROCYTE [DISTWIDTH] IN BLOOD BY AUTOMATED COUNT: 14.5 %
GLUCOSE BLD-MCNC: 114 MG/DL (ref 70–99)
HCT VFR BLD AUTO: 32.7 %
HGB BLD-MCNC: 10.9 G/DL
IMM GRANULOCYTES # BLD AUTO: 0.01 X10(3) UL (ref 0–1)
IMM GRANULOCYTES NFR BLD: 0.2 %
LYMPHOCYTES # BLD AUTO: 1.78 X10(3) UL (ref 1–4)
LYMPHOCYTES NFR BLD AUTO: 30.8 %
MAGNESIUM SERPL-MCNC: 2.5 MG/DL (ref 1.6–2.6)
MCH RBC QN AUTO: 31.5 PG (ref 26–34)
MCHC RBC AUTO-ENTMCNC: 33.3 G/DL (ref 31–37)
MCV RBC AUTO: 94.5 FL
MONOCYTES # BLD AUTO: 0.65 X10(3) UL (ref 0.1–1)
MONOCYTES NFR BLD AUTO: 11.3 %
NEUTROPHILS # BLD AUTO: 2.88 X10 (3) UL (ref 1.5–7.7)
NEUTROPHILS # BLD AUTO: 2.88 X10(3) UL (ref 1.5–7.7)
NEUTROPHILS NFR BLD AUTO: 49.9 %
OSMOLALITY SERPL CALC.SUM OF ELEC: 298 MOSM/KG (ref 275–295)
PHOSPHATE SERPL-MCNC: 5.9 MG/DL (ref 2.5–4.9)
PLATELET # BLD AUTO: 221 10(3)UL (ref 150–450)
POTASSIUM SERPL-SCNC: 4.6 MMOL/L (ref 3.5–5.1)
RBC # BLD AUTO: 3.46 X10(6)UL
SODIUM SERPL-SCNC: 138 MMOL/L (ref 136–145)
WBC # BLD AUTO: 5.8 X10(3) UL (ref 4–11)

## 2024-04-04 PROCEDURE — 84100 ASSAY OF PHOSPHORUS: CPT | Performed by: STUDENT IN AN ORGANIZED HEALTH CARE EDUCATION/TRAINING PROGRAM

## 2024-04-04 PROCEDURE — 83735 ASSAY OF MAGNESIUM: CPT | Performed by: HOSPITALIST

## 2024-04-04 PROCEDURE — 80048 BASIC METABOLIC PNL TOTAL CA: CPT | Performed by: HOSPITALIST

## 2024-04-04 PROCEDURE — 85025 COMPLETE CBC W/AUTO DIFF WBC: CPT | Performed by: HOSPITALIST

## 2024-04-04 NOTE — PLAN OF CARE
Assumed care at approximately 1930.  A & O x 4.   RA.  NSR on tele.   C/o moderate abdominal pain, managed with PRN Bentyl and Norco.   Call light within reach.   Patient updated on plan of care.

## 2024-04-04 NOTE — PLAN OF CARE
Pt discharged per wc per pct. No c/o and in no apparent distress. Reviewed AVS in detail with pt. Had HD this afternoon at 1400. He will go to that appt and  make futher f/u appts as instructed.

## 2024-04-04 NOTE — PLAN OF CARE
Order put in for pt by renal. Pt has been dc'd. Called pt to inform him of med change. Also let Dr iSngh from renal know.

## 2024-04-05 ENCOUNTER — PATIENT OUTREACH (OUTPATIENT)
Dept: CASE MANAGEMENT | Age: 44
End: 2024-04-05

## 2024-04-05 NOTE — PAYOR COMM NOTE
--------------  ADMISSION REVIEW     Payor: [employer]  Subscriber #:  [id number]  Authorization Number: [id number]    Admit date: 4/2/24  Admit time:  7:51 PM       REVIEW DOCUMENTATION:    ED Provider Notes signed by Stacy Shane MD at 4/2/2024  5:46 PM        Patient Seen in: Only Emergency Department In [place]    History     Chief Complaint   Patient presents with    Abdomen/Flank Pain     Stated Complaint: abd pain, rectal bleeding    Subjective:   HPI    Patient's chief complaint is lower abdominal pain going on for the last day as well as recurrent rectal bleeding.  Of note he was admitted recently for rectal bleeding was found to have internal hemorrhoids.    He did not go to dialysis today and states he can be rescheduled.    States she started to feel mildly short of breath while has been in the ER for his evaluation.  No chest pain.    Objective:   Past Medical History:   Diagnosis Date    Asthma ([facility])     Attention deficit hyperactivity disorder (ADHD)     Back problem     Bipolar 1 disorder ([facility])     Cancer ([facility])     CKD (chronic kidney disease) stage 3, GFR 30-59 ml/min ([facility])     Dr Meeks    Congestive heart disease ([facility])     COPD (chronic obstructive pulmonary disease) ([facility])     Coronary atherosclerosis     Deep vein thrombosis ([facility])     at age 17 R/T cast    Depression     Diabetes ([facility])     Essential hypertension 2014    3/21 echo: severe concentric LVH with normal EF and no MR or pHTN    Extrinsic asthma, unspecified     Heart attack ([facility])     2016- angiogram- no intervention    Heart valve disease     mitral valve repair in 1994/    High blood pressure     High cholesterol     History of mitral valve repair 12/2022    Hyperlipidemia     Low back pain     tight and stiff after sweeping and mopping    LVH (left ventricular hypertrophy)     Migraines     Mixed hyperlipidemia 03/2021     HDL 38 LDL 97 VLDL 57     Monoclonal gammopathy     IgG kappa     MVP (mitral  valve prolapse)     Repair 1994 at [place]; echoes as recently as 3/21 show mild or trivial MR and no stenosis    Neuropathy     Osteoarthritis     hip ,knees    Pneumonia due to organism     Pulmonary embolism (HCC) 01/2023    Renal disorder     TIA (transient ischemic attack) 03/2021    Initial history of left-sided weakness and slurred speech. (+) cocaine. MRI of the brain, CT angiogram of the head and neck, and 2D echo are all unremarkable.     TMJ (dislocation of temporomandibular joint)     Troponin level elevated 03/2021    Trop 60 60 47 with TIA and no CP: Lexiscan negative with EF 51     Positive for stated complaint: abd pain, rectal bleeding  Other systems are as noted in HPI.  Constitutional and vital signs reviewed.      All other systems reviewed and negative except as noted above.    Physical Exam     ED Triage Vitals   BP 04/02/24 1351 (!) 185/117   Pulse 04/02/24 1351 103   Resp 04/02/24 1351 22   Temp 04/02/24 1351 98.6 °F (37 °C)   Temp src 04/02/24 1351 Temporal   SpO2 04/02/24 1351 97 %   O2 Device 04/02/24 1533 None (Room air)       Current:BP (!) 175/124   Pulse 99   Temp 98.4 °F (36.9 °C) (Temporal)   Resp 24   Ht 188 cm (6' 2\")   Wt 108.9 kg   SpO2 94%   BMI 30.81 kg/m²     Physical Exam   Constitutional: Awake, alert, well appearing  Head: Normocephalic and atraumatic.   Eyes: Conjunctivae are normal. Pupils are equal, round, and reactive to light.   Neck: Normal range of motion. Neck supple.   Cardiovascular: Normal rate, regular rhythm  Pulmonary/Chest: Mildly tachypneic, crackles bilateral bases  Abdominal: Soft. Bowel sounds are normal.   Neurological: Pt is alert and oriented to person, place, and time. no cranial nerve deficits  Skin: Skin is warm and dry.    Belly benign multiple exams    ED Course     Labs Reviewed   COMP METABOLIC PANEL (14) - Abnormal; Notable for the following components:       Result Value    Glucose 160 (*)     Potassium 5.5 (*)     BUN 65 (*)      Creatinine 9.43 (*)     Calcium, Total 7.5 (*)     Calculated Osmolality 308 (*)     eGFR-Cr 6 (*)     AST 50 (*)     A/G Ratio 0.9 (*)     All other components within normal limits   CBC W/ DIFFERENTIAL - Abnormal; Notable for the following components:    RBC 3.47 (*)     HGB 10.9 (*)     HCT 32.8 (*)     All other components within normal limits   CBC WITH DIFFERENTIAL WITH PLATELET    Narrative:     The following orders were created for panel order CBC With Differential With Platelet.  Procedure                               Abnormality         Status                     ---------                               -----------         ------                     CBC W/ DIFFERENTIAL[[id number]]          Abnormal            Final result                 Please view results for these tests on the individual orders.   POTASSIUM   RAINBOW DRAW LAVENDER   RAINBOW DRAW LIGHT GREEN   RAINBOW DRAW BLUE         XR CHEST AP PORTABLE  (CPT=71045)    Result Date: 4/2/2024  CONCLUSION:  Mild pulmonary vascular congestion.  There is a small right pleural effusion that is likely stable.   LOCATION:  [id number]      Dictated by ([facility]): Ryan Hernández MD on 4/02/2024 at 5:01 PM     Finalized by (CST): Ryan Hernández MD on 4/02/2024 at 5:02 PM       CT ABDOMEN+PELVIS(CPT=74176)    Result Date: 4/2/2024  CONCLUSION:   Diverticulosis of the colon without evidence of acute diverticulitis.  The appendix is normal.  No evidence of an acute inflammatory process.  Small right pleural effusion with atelectasis.   LOCATION:  [id number]   Dictated by ([facility]): Ryan Hernández MD on 4/02/2024 at 4:18 PM     Finalized by (CST): Ryan Hernández MD on 4/02/2024 at 4:21 PM          Differential diagnoses considered: Diverticulitis, diverticulosis, constipation, hypertensive urgency hypertensive urgency, pulmonary edema      -Unclear etiology as abdominal pain, CT scan and abdominal exam benign.    -Patient remained persistently hypertensive while  here and has become increasingly short of breath.  Has not needed oxygen but has crackles on exam and chest x-ray seems overloaded.  -Has required multiple doses of IV antihypertensives, I am going to put him on a nitro drip and he will need admission for dialysis.    -Discussed with Dr. Seipp, nephrology, request to be contacted once the patient arrives to the the main hospital.    Patient will be admitted primarily to the [facility] hospitalist.    Care has been discussed with the admitting physician.      *My independent interpretation of radiographs: Vascular congestion    *Discussion of ongoing management of this patient's care included: Nephrology, admitting service  *Comorbidities contributing to the complexity of decision making: ESRD on dialysis   *External charts reviewed: Matt operative report from GI service on 3/25/2024 reviewed.  He had his flex sig done and it showed internal hemorrhoids  *Additional sources of history: n/a    Shared decision making was done by: patient, myself.        Admission disposition: 4/2/2024  4:48 PM    A total of 45 minutes of critical care time (exclusive of billable procedures) was administered to manage the patient's unstable vital signs, respiratory instability, and cardiovascular instability due to his hypertensive urgency necessitating multiple IV hypertensives as well as a nitro drip, need for urgent dialysis tonight.  This involved direct patient intervention, complex decision making, and/or extensive discussions with the patient, family, and clinical staff.        Disposition and Plan     Clinical Impression:  1. Hypertensive urgency    2. ESRD (end stage renal disease) on dialysis (HCC)    3. Dyspnea, unspecified type    4. Lower abdominal pain       Disposition:  Admit  4/2/2024  4:48 pm    Hospital Problems       Present on Admission  Date Reviewed: 1/3/2024            ICD-10-CM Noted POA    * (Principal) Hypertensive urgency I16.0 11/29/2020 Unknown                  4/3 H&P    Chief Complaint: Bloody BM     History of Present Illness: Godwin Fonseca is a 43 year old male with history of asthma, ADD, bipolar 1, end-stage renal disease on dialysis, COPD, CHF, coronary disease, history of DVT and PE, depression, type 2 diabetes, hypertension, hyperlipidemia, migraines, neuropathy presenting with bloody bowel movement.  Patient was just recently admitted for GI bleed.  Patient has not had an evaluation did not show any active bleeding.  Patient was likely having GI bleed from internal hemorrhoids.  Patient was discharged on Anusol to follow-up with general surgery as an outpatient.  Patient had recurrent bleeding after discharge.  Patient's last bloody bowel was 2 days ago.  Patient has been having chronic abdominal pain.  Patient says that there is no change in his chronic abdominal pain.  Patient denies any nausea vomiting poor oral intake.  Due to patient's bloody bowel once he finally decided come to emergency room for further ration treatment.  Patient denies any other positive review of systems at this time.    General: No acute distress. Alert and oriented   HEENT: Normocephalic atraumatic. Moist mucous membranes. EOM-I.   Neck: No JVD. No carotid bruits.  Respiratory: Clear to auscultation bilaterally. No wheezes. No crackles  Cardiovascular: S1, S2. Regular rate and rhythm. No murmurs  Chest and Back: No tenderness or deformity.  Abdomen: Soft, chronic tenderness of the left lower abdomen, nondistended.  Positive bowel sounds. No rebound, guarding  Neurologic: No focal neurological deficits. CNII-XII grossly intact. Sensation and strength intact  Musculoskeletal: Moves all extremities.  Extremities: No edema or tenderness of the LE  Integument: No new rashes or lesions.   Psychiatric: Appropriate mood and affect.       ab 04/02/24  1409 04/03/24  0521   WBC 10.0 8.9   HGB 10.9* 11.6*   MCV 94.5 93.0   .0 231.0               Recent Labs   Lab 04/02/24  1409  04/02/24  1735 04/03/24  0521   *  --  159*   BUN 65*  --  27*   CREATSERUM 9.43*  --  5.41*   CA 7.5*  --  8.9   ALB 3.7  --   --      --  137   K 5.5* 5.1 4.7     --  106   CO2 23.0  --  23.0   ALKPHO 104  --   --    AST 50*  --   --    ALT 25  --   --    BILT 0.6  --   --    TP 7.6  --   --      43 year old male with history of asthma, ADD, bipolar 1, end-stage renal disease on dialysis, COPD, CHF, coronary disease, history of DVT and PE, depression, type 2 diabetes, hypertension, hyperlipidemia, migraines, neuropathy presenting with bloody bowel movement.      GI bleed  -no active bleed in the last 2 days  -hgb currently stable  -GI consult  -no transfusion at this time  -monitor hgb, repeat in am      Chronic Abdominal pain  -no acute component   -bentyl prn  -norco po prn  -dc iv pain meds     ESRD  -HD per renal  -renal     ADD  Bipolar  Depression  -adderall  -abilify  Wellbutrin  Fluoxetine  -lamictal     Asthma  COPD  -continue inhalers     Type 2 DM  -hold home oral meds  -insulin sliding scale     HTN  -sbp stable  -nifedipine  -losartan  -hydralazine  -clonidine  -carvedilol             4/3 GI  Reason for Consultation:  Rectal bleeding     Godwin Fonseca is a a(n) 43 year old male.      Exctensive w/u in the past.  Has chronic rectal bleeding and intermittent lower abd pain.  Had cscope w/ Dr Vu and then by Dr Suarez last year and then 3/25/24 flex sig w/ Dr Delgado showing hemorrhoids and no bleeding through to the sigmoid     States chronic constipation improves w/ stool softeners and miralax, states he is happy w/ this regimen     States rectal bleeding unchnaged     States no exac/remitting factors for the episodes of pain in lower abd, he wants to try bentyl     States no n/v, f/c/s, change in the llq intermittent pain or bleeding symptoms.     States now having back pain chronically, on tramadol for this, risk constipation discussed w/ pt     Pt does not want endoscopy or  LAVERNE exam, wants to try bentyl     He wants to eat     Risk endoscopy reviewed w/ him     States no bleeding since yesterday. States feels well otherwise     D/w Dr Rebolledo today as well     Reviewed labs w/ pt as well    ASSESSMENT AND PLAN:   1 rectal bleeding, intermittent llq pain , constipation -- risk of med induced constipation reviewed.  Has chronic hemorrhodis, states bleeding and other symptoms are unchanged from baseline     He has not had bleeding for over 24 hrs and no bm for over 24 hrs     We discussed options, he does not want other eval, reviewed risk of false (-) labs, imaging (ct w/out iv contrast), endoscopy, laverne etc w/ him.     He did want trial of bentyl. He is not sure if he wants surg c/s for hemorrhoids        Multiple options for how to proceed were discussed in detail with patient, the patient was agreeable to the following plan ...        --he will decide if he wants to see surg, advised he f/u w/ surgery if he wants this  --he wants to cont prn miralax/softeners as outpt  --can try 3 doses bentyl prn to see if helps w/ his abd pain but currently he is complaining more of back pain than abd pain  --he wants to eat            4/3 Nephrology  HISTORY OF PRESENT ILLNESS:      Godwin Fonseca is a 43 year old male with a medical history notable for ESRD on HD TTS who presents for abdominal pain and recurrent rectal bleeding. Nephrology consulted for HD management.     Patient missed dialysis yesterday. Normal TTS schedule. Noted to be short of breath and hypertensive. HD overnight with 4L UF. Feeling better today. No acute concerns or complaints outside of rectal bleeding.      ASSESSMENT:      # ESRD on HD  -TTS schedule outpatient  -[facility]  -[facility]     # HTN/Volume Status  -Fluid overloaded  -Hypertensive urgency     # Anemia  -Rectal bleeding     # Secondary Hyperparathyroidism     PLAN:      -Continue HD per TTS schedule  -UF with HD as tolerated  -Continue home BP  medications  -Rectal bleeding per GI  -Continue sevelamer, will check phosphorus levels  -Avoid nephrotoxins and renally dose medications for creatinine clearance  -Monitor intake and output daily  -Daily weights        Medications 04/02/24 04/03/24 04/04/24   amphetamine-dextroamphetamine (Adderall) tab 10 mg  Dose: 10 mg  Freq: 2 times daily before meals Route: OR  Start: 04/03/24 0700 End: 04/04/24 1356   Admin Instructions:   To avoid insomnia, late evening doses should be avoided.     1011 KJ-Given   1755 KJ-Given       0944 KJ-Given   1356-D/C'd       hydrALAzine (Apresoline) 20 mg/mL injection 20 mg  Dose: 20 mg  Freq: Once Route: IV  Start: 04/02/24 1641 End: 04/02/24 1717   Admin Instructions:   once    1717 CP-Given            ketorolac (Toradol) 15 MG/ML injection 15 mg  Dose: 15 mg  Freq: Once Route: IV  Start: 04/02/24 1450 End: 04/02/24 1455    1455 DE-Given            labetalol (Trandate) 5 mg/mL injection 20 mg  Dose: 20 mg  Freq: Once Route: IV  Start: 04/02/24 1515 End: 04/02/24 1534   Admin Instructions:   once    1534 CP-Given [C]            NIFEdipine ER (Procardia-XL) 24 hr tab 60 mg  Dose: 60 mg  Freq: 2 times daily Route: OR  Start: 04/02/24 2200 End: 04/04/24 1356   Admin Instructions:   Do not crush    2201 HM-Given        0850 KJ-Given   2003 HM-Given       0944 KJ-Given   1356-D/C'd               Medications 04/02/24 04/03/24 04/04/24   nitroGLYCERIN in dextrose 5% 50 mg/250mL infusion premix  Rate: 1.5-6 mL/hr Dose: 5-20 mcg/min  Freq: Titrated Route: IV  Last Dose: Stopped (04/03/24 1141)  Start: 04/02/24 1641 End: 04/03/24 1136   Admin Instructions:   Start at 5 mcg/min and increase by 5 mcg/min every 3 to 5 minutes up to 20 mcg/min prn chest symptoms and SBP greater than 100 mmHg.   Order specific questions:       1755 CP-Rate/Dose Change   1842 CP-Handoff       1136-D/C'd  1141 KJ-Stopped                     Medications 04/02/24 04/03/24 04/04/24   Or  HYDROcodone-acetaminophen  (Norco) 5-325 MG per tab 1 tablet  Dose: 1 tablet  Freq: Every 4 hours PRN Route: OR  PRN Reason: moderate pain  Start: 04/02/24 1951 End: 04/04/24 1356   Admin Instructions:   Use PRN reason as a guide and follow range order policy               2244 HM-Given       1356-D/C'd        Or  HYDROcodone-acetaminophen (Norco) 5-325 MG per tab 2 tablet  Dose: 2 tablet  Freq: Every 4 hours PRN Route: OR  PRN Reason: severe pain  Start: 04/02/24 1951 End: 04/04/24 1356   Admin Instructions:   Use PRN reason as a guide and follow range order policy    2006 HM-Given        (0007 HM)-Not Given          1356-D/C'd        dicyclomine (Bentyl) cap 10 mg  Dose: 10 mg  Freq: 3 times daily PRN Route: OR  PRN Reason: Cramping  Start: 04/03/24 1334 End: 04/04/24 0944     1718 KJ-Given   2007 HM-Given       0944 KJ-Given          heparin (Porcine) 1000 UNIT/ML injection 1,500 Units  Dose: 1.5 mL  Freq: As needed with Dialysis Route: IF  PRN Reason: Line care  Start: 04/02/24 1950 End: 04/04/24 1356   Admin Instructions:   Instill 1.5 ml in each port unless otherwise indicated on catheter     0200 HM-Given [C]        1356-D/C'd        hydrALAzine (Apresoline) 20 mg/mL injection 10 mg  Dose: 10 mg  Freq: Every 6 hours PRN Route: IV  PRN Reason: Increased blood pressure  Start: 04/03/24 0038 End: 04/04/24 1356   Admin Instructions:   SBP>160     0052 HM-Given        1356-D/C'd        Or  HYDROmorphone (Dilaudid) 1 MG/ML injection 0.4 mg  Dose: 0.4 mg  Freq: Every 2 hour PRN Route: IV  PRN Reason: moderate pain  Start: 04/02/24 2153 End: 04/03/24 1333   Admin Instructions:   Use PRN reason as a guide and follow range order policy. If oral pain meds are ordered and patient can tolerate oral intake, start with PRN oral pain medications first.    2210 HM-Given                         1333-D/C'd       Or  HYDROmorphone (Dilaudid) 1 MG/ML injection 0.8 mg  Dose: 0.8 mg  Freq: Every 2 hour PRN Route: IV  PRN Reason: severe pain  Start: 04/02/24  2153 End: 04/03/24 1333   Admin Instructions:   Use PRN reason as a guide and follow range order policy. If oral pain meds are ordered and patient can tolerate oral intake, start with PRN oral pain medications first.            0011 HM-Given   0305 HM-Given   0539 HM-Given     0745 HM-Given   1204 KJ-Given   1333-D/C'd             04/04/24 1100 -- 88 -- -- 97 % -- None (Room air) -- KJ   04/04/24 0730 97.6 °F (36.4 °C) 98 22 135/79 94 % -- None (Room air) -- ZC   04/04/24 0500 98.3 °F (36.8 °C) 86 15 115/63 96 % -- None (Room air) --    04/04/24 0000 98.3 °F (36.8 °C) 83 20 115/71 94 % -- None (Room air) -- EDIN   04/03/24 2012 97.7 °F (36.5 °C) 83 17 112/72 95 % -- None (Room air) --    04/03/24 1700 -- -- -- -- 97 % -- None (Room air) -- KJ   04/03/24 1600 -- -- -- -- -- -- None (Room air) -- KJ   04/03/24 1600 98.2 °F (36.8 °C) 92 20 117/68 93 % -- -- --    04/03/24 1200 97.5 °F (36.4 °C) 82 15 138/82 93 % -- Nasal cannula 2 L/min    04/03/24 0900 -- 109 -- -- 97 % -- -- --    04/03/24 0800 97.7 °F (36.5 °C) 108 18 145/101 Abnormal  96 % -- Nasal cannula 2 L/min    04/03/24 0700 -- 104 16 149/102 Abnormal  96 % -- -- --    04/03/24 0500 98.1 °F (36.7 °C) 108 18 150/91 Abnormal  96 % -- Nasal cannula -- JL   04/03/24 0500 -- -- -- -- -- -- -- 2 L/min    04/03/24 0318 -- 98 -- 165/115 Abnormal  -- -- -- --    04/03/24 0300 -- 96 -- 180/117 Abnormal  100 % -- -- --    04/03/24 0200 -- 97 -- 182/122 Abnormal  98 % -- -- --    04/03/24 0145 -- 96 -- 188/128 Abnormal  98 % -- -- --    04/03/24 0132 -- 95 -- -- 99 % -- -- --    04/03/24 0131 -- 94 -- 189/124 Abnormal  -- -- -- --    04/03/24 0100 -- 91 -- 194/139 Abnormal  99 % -- -- --    04/03/24 0045 -- 94 -- 204/133 Abnormal  98 % -- -- --    04/03/24 0030 98 °F (36.7 °C) -- 18 -- -- -- Nasal cannula 4 L/min    04/03/24 0030 -- 95 -- 195/138 Abnormal  99 % -- -- --    04/03/24 0015 -- 98 -- 201/139 Abnormal  100 % -- -- --     04/02/24 2245 -- 102 -- 195/139 Abnormal  97 % -- -- --    04/02/24 2130 -- 105 14 192/121 Abnormal  93 % -- -- --    04/02/24 2129 -- 111 14 -- 97 % -- -- --    04/02/24 2100 97.8 °F (36.6 °C) 106 18 197/123 Abnormal  97 % -- None (Room air) -- JL   04/02/24 1952 -- -- -- -- -- 240 lb -- --    04/02/24 1827 -- 95 26 188/129 Abnormal  99 % -- None (Room air) -- CP   04/02/24 1758 -- 98 15 180/112 Abnormal  96 % -- None (Room air) -- CP   04/02/24 1753 -- 100 21 172/123 Abnormal  96 % -- None (Room air) --    04/02/24 1737 98.4 °F (36.9 °C) 99 24 175/124 Abnormal  94 % -- None (Room air) -- CP   04/02/24 1730 -- 103 25 181/129 Abnormal  94 % -- None (Room air) --    04/02/24 1715 -- 99 26 181/143 Abnormal  95 % -- None (Room air) --    04/02/24 1533 98.2 °F (36.8 °C) 105 14 180/136 Abnormal  95 % -- None (Room air) -- CP   04/02/24 1500 -- -- -- 200/136 Abnormal  -- -- -- -- DE   04/02/24 1455 -- 104 20 -- -- -- -- -- DE   04/02/24 1351 98.6 °F (37 °C) 103 22 185/117 Abnormal  97 % 240 lb -- -- AB                 --------------  DISCHARGE REVIEW    Payor: [employer]  Subscriber #:  [id number]  Authorization Number: [id number]    Admit date: 4/2/24  Admit time:   7:51 PM  Discharge Date: 4/4/2024 11:56 AM     Admitting Physician: Lemuel Paige MD  Attending Physician:  No att. providers found  Primary Care Physician: Prieto Novak MD

## 2024-04-05 NOTE — PROGRESS NOTES
Hospital follow up.    Prieto Novak  Specialty: Internal Medicine  [facility]  [address]  [address]  [place] [address]  [phone]  1 week    Heath Vu  Specialty: GASTROENTEROLOGY  [facility]  [address]  [address]  [facility] [address] [phone]  1-4 weeks    Patient will schedule appointments.  Confirmed with patient.    Closing encounter.

## 2024-04-22 ENCOUNTER — APPOINTMENT (OUTPATIENT)
Dept: CT IMAGING | Facility: HOSPITAL | Age: 44
End: 2024-04-22
Attending: EMERGENCY MEDICINE
Payer: MEDICARE

## 2024-04-22 ENCOUNTER — HOSPITAL ENCOUNTER (EMERGENCY)
Facility: HOSPITAL | Age: 44
Discharge: HOME | End: 2024-04-22
Attending: EMERGENCY MEDICINE
Payer: MEDICARE

## 2024-04-22 VITALS
DIASTOLIC BLOOD PRESSURE: 112 MMHG | HEART RATE: 85 BPM | BODY MASS INDEX: 35 KG/M2 | SYSTOLIC BLOOD PRESSURE: 166 MMHG | RESPIRATION RATE: 10 BRPM | WEIGHT: 269 LBS | OXYGEN SATURATION: 99 % | TEMPERATURE: 98 F

## 2024-04-22 DIAGNOSIS — N18.6 ESRD ON HEMODIALYSIS (HCC): ICD-10-CM

## 2024-04-22 DIAGNOSIS — Z99.2 ESRD ON HEMODIALYSIS (HCC): ICD-10-CM

## 2024-04-22 DIAGNOSIS — K62.5 RECTAL BLEEDING: Primary | ICD-10-CM

## 2024-04-22 DIAGNOSIS — D64.9 ANEMIA, UNSPECIFIED TYPE: ICD-10-CM

## 2024-04-22 DIAGNOSIS — I10 HYPERTENSION, UNSPECIFIED TYPE: ICD-10-CM

## 2024-04-22 LAB
ALBUMIN SERPL-MCNC: 3.7 G/DL (ref 3.4–5)
ALBUMIN/GLOB SERPL: 0.9 {RATIO} (ref 1–2)
ALP LIVER SERPL-CCNC: 109 U/L
ALT SERPL-CCNC: 31 U/L
ANION GAP SERPL CALC-SCNC: 12 MMOL/L (ref 0–18)
ANTIBODY SCREEN: NEGATIVE
APTT PPP: 28.6 SECONDS (ref 23.3–35.6)
AST SERPL-CCNC: 36 U/L (ref 15–37)
BASOPHILS # BLD AUTO: 0.05 X10(3) UL (ref 0–0.2)
BASOPHILS NFR BLD AUTO: 0.7 %
BILIRUB SERPL-MCNC: 0.4 MG/DL (ref 0.1–2)
BUN BLD-MCNC: 89 MG/DL (ref 9–23)
CALCIUM BLD-MCNC: 7.6 MG/DL (ref 8.5–10.1)
CHLORIDE SERPL-SCNC: 105 MMOL/L (ref 98–112)
CO2 SERPL-SCNC: 21 MMOL/L (ref 21–32)
CREAT BLD-MCNC: 10.3 MG/DL
EGFRCR SERPLBLD CKD-EPI 2021: 6 ML/MIN/1.73M2 (ref 60–?)
EOSINOPHIL # BLD AUTO: 0.32 X10(3) UL (ref 0–0.7)
EOSINOPHIL NFR BLD AUTO: 4.7 %
ERYTHROCYTE [DISTWIDTH] IN BLOOD BY AUTOMATED COUNT: 13.8 %
GLOBULIN PLAS-MCNC: 4 G/DL (ref 2.8–4.4)
GLUCOSE BLD-MCNC: 138 MG/DL (ref 70–99)
HCT VFR BLD AUTO: 30 %
HGB BLD-MCNC: 10.4 G/DL
IMM GRANULOCYTES # BLD AUTO: 0.02 X10(3) UL (ref 0–1)
IMM GRANULOCYTES NFR BLD: 0.3 %
INR BLD: 1.01 (ref 0.8–1.2)
LYMPHOCYTES # BLD AUTO: 1 X10(3) UL (ref 1–4)
LYMPHOCYTES NFR BLD AUTO: 14.7 %
MCH RBC QN AUTO: 31.8 PG (ref 26–34)
MCHC RBC AUTO-ENTMCNC: 34.7 G/DL (ref 31–37)
MCV RBC AUTO: 91.7 FL
MONOCYTES # BLD AUTO: 0.71 X10(3) UL (ref 0.1–1)
MONOCYTES NFR BLD AUTO: 10.4 %
NEUTROPHILS # BLD AUTO: 4.7 X10 (3) UL (ref 1.5–7.7)
NEUTROPHILS # BLD AUTO: 4.7 X10(3) UL (ref 1.5–7.7)
NEUTROPHILS NFR BLD AUTO: 69.2 %
OSMOLALITY SERPL CALC.SUM OF ELEC: 315 MOSM/KG (ref 275–295)
PLATELET # BLD AUTO: 265 10(3)UL (ref 150–450)
POTASSIUM SERPL-SCNC: 3.5 MMOL/L (ref 3.5–5.1)
PROT SERPL-MCNC: 7.7 G/DL (ref 6.4–8.2)
PROTHROMBIN TIME: 13.3 SECONDS (ref 11.6–14.8)
RBC # BLD AUTO: 3.27 X10(6)UL
RH BLOOD TYPE: POSITIVE
SODIUM SERPL-SCNC: 138 MMOL/L (ref 136–145)
WBC # BLD AUTO: 6.8 X10(3) UL (ref 4–11)

## 2024-04-22 PROCEDURE — 96375 TX/PRO/DX INJ NEW DRUG ADDON: CPT

## 2024-04-22 PROCEDURE — 85730 THROMBOPLASTIN TIME PARTIAL: CPT | Performed by: EMERGENCY MEDICINE

## 2024-04-22 PROCEDURE — 86900 BLOOD TYPING SEROLOGIC ABO: CPT | Performed by: EMERGENCY MEDICINE

## 2024-04-22 PROCEDURE — 80053 COMPREHEN METABOLIC PANEL: CPT | Performed by: EMERGENCY MEDICINE

## 2024-04-22 PROCEDURE — 86901 BLOOD TYPING SEROLOGIC RH(D): CPT | Performed by: EMERGENCY MEDICINE

## 2024-04-22 PROCEDURE — 99285 EMERGENCY DEPT VISIT HI MDM: CPT

## 2024-04-22 PROCEDURE — 74177 CT ABD & PELVIS W/CONTRAST: CPT | Performed by: EMERGENCY MEDICINE

## 2024-04-22 PROCEDURE — 85025 COMPLETE CBC W/AUTO DIFF WBC: CPT | Performed by: EMERGENCY MEDICINE

## 2024-04-22 PROCEDURE — 85610 PROTHROMBIN TIME: CPT | Performed by: EMERGENCY MEDICINE

## 2024-04-22 PROCEDURE — 96374 THER/PROPH/DIAG INJ IV PUSH: CPT

## 2024-04-22 PROCEDURE — 96361 HYDRATE IV INFUSION ADD-ON: CPT

## 2024-04-22 PROCEDURE — 99284 EMERGENCY DEPT VISIT MOD MDM: CPT

## 2024-04-22 PROCEDURE — 86850 RBC ANTIBODY SCREEN: CPT | Performed by: EMERGENCY MEDICINE

## 2024-04-22 RX ORDER — CLONIDINE HYDROCHLORIDE 0.1 MG/1
0.1 TABLET ORAL ONCE
Status: COMPLETED | OUTPATIENT
Start: 2024-04-22 | End: 2024-04-22

## 2024-04-22 RX ORDER — HYDROCORTISONE 25 MG/G
1 CREAM TOPICAL 2 TIMES DAILY
Qty: 28 G | Refills: 2 | Status: SHIPPED | OUTPATIENT
Start: 2024-04-22

## 2024-04-22 RX ORDER — HYDROMORPHONE HYDROCHLORIDE 1 MG/ML
1 INJECTION, SOLUTION INTRAMUSCULAR; INTRAVENOUS; SUBCUTANEOUS ONCE
Status: COMPLETED | OUTPATIENT
Start: 2024-04-22 | End: 2024-04-22

## 2024-04-22 RX ORDER — DOCUSATE SODIUM 100 MG/1
100 CAPSULE, LIQUID FILLED ORAL 2 TIMES DAILY PRN
Qty: 60 CAPSULE | Refills: 0 | Status: SHIPPED | OUTPATIENT
Start: 2024-04-22 | End: 2024-05-22

## 2024-04-22 RX ORDER — ONDANSETRON 2 MG/ML
4 INJECTION INTRAMUSCULAR; INTRAVENOUS ONCE
Status: COMPLETED | OUTPATIENT
Start: 2024-04-22 | End: 2024-04-22

## 2024-04-23 NOTE — ED PROVIDER NOTES
Patient Seen in: [facility] Emergency Department      History     Chief Complaint   Patient presents with    GI Bleeding     Stated Complaint: GIB    Subjective:   Patient 44-year-old male dialysis patient who receives dialysis on  and  who presents for chronic abdominal pain for last year.  Patient reports abdominal pain with rectal bleeding.  He is scheduled to see surgeon on Friday regarding known hemorrhoids and colon polyps.  Patient reports having bloody bowel movements.  He reports stool is red and dark black as well.  Patient reports no fevers no vomiting.  He appears to be uncomfortable.  He has no family with him currently but he says he can get a ride home.    The history is provided by the patient.           Objective:   No pertinent past medical history.            Past Surgical History:   Procedure Laterality Date    Colonoscopy N/A 2023    Procedure: COLONOSCOPY;  Surgeon: Heath Vu MD;  Location:  ENDOSCOPY    Colonoscopy N/A 2023    Procedure: COLONOSCOPY with cold snare polypectomy and forcep polypectomy;  Surgeon: Ousmane Suarez MD;  Location:  ENDOSCOPY    Colonoscopy & polypectomy  2019    Egd  2019    Duodenitis. Biopsied. EUS for weight loss was negative    Heart surgery      Hernia surgery  2022    Dr Barnes    Laminectomy,>2 sgmt,lumbar  2018    L4-L5 Decomp Discectomy ROEM L4-L5    Mitralplasty w cp bypass      Christiano: Repair    Repair rotator cuff,chronic Left     torn and had a ruptured bicep    Valve repair      mitral valve                Social History     Socioeconomic History    Marital status:    Tobacco Use    Smoking status: Former     Current packs/day: 0.00     Average packs/day: 1 pack/day for 27.0 years (27.0 ttl pk-yrs)     Types: Cigarettes     Start date: 1995     Quit date: 2022     Years since quittin.1    Smokeless tobacco: Never   Vaping Use    Vaping status: Never Used    Substance and Sexual Activity    Alcohol use: No    Drug use: No     Social Determinants of Health     Food Insecurity: No Food Insecurity (4/2/2024)    Food Insecurity     Food Insecurity: Never true   Transportation Needs: No Transportation Needs (4/2/2024)    Transportation Needs     Lack of Transportation: No   Housing Stability: Low Risk  (4/2/2024)    Housing Stability     Housing Instability: No              Review of Systems   Constitutional:  Negative for fever.   Gastrointestinal:  Positive for abdominal pain, anal bleeding and blood in stool.       Positive for stated complaint: GIB  Other systems are as noted in HPI.  Constitutional and vital signs reviewed.      All other systems reviewed and negative except as noted above.    Physical Exam     ED Triage Vitals [04/22/24 2201]   BP (!) 164/111   Pulse 92   Resp 10   Temp 98.1 °F (36.7 °C)   Temp src Temporal   SpO2 98 %   O2 Device None (Room air)       Current:BP (!) 166/112   Pulse 85   Temp 98.1 °F (36.7 °C) (Temporal)   Resp 10   Wt 122 kg   SpO2 99%   BMI 34.54 kg/m²         Physical Exam  Vitals and nursing note reviewed.   Constitutional:       Appearance: He is obese. He is not ill-appearing, toxic-appearing or diaphoretic.      Comments: Adult male appears uncomfortable   HENT:      Head: Normocephalic and atraumatic.   Eyes:      Extraocular Movements: Extraocular movements intact.      Pupils: Pupils are equal, round, and reactive to light.   Cardiovascular:      Rate and Rhythm: Normal rate and regular rhythm.      Pulses: Normal pulses.      Heart sounds: Normal heart sounds.   Pulmonary:      Effort: No respiratory distress.      Breath sounds: Normal breath sounds. No wheezing.      Comments: Hyperventilating  Abdominal:      General: Bowel sounds are normal. There is no distension.      Palpations: Abdomen is soft.      Tenderness: There is abdominal tenderness. There is no guarding or rebound.      Comments: Generalized even  with light touch   Musculoskeletal:         General: Normal range of motion.   Skin:     General: Skin is warm.      Capillary Refill: Capillary refill takes less than 2 seconds.   Neurological:      General: No focal deficit present.      Mental Status: He is oriented to person, place, and time.      Cranial Nerves: No cranial nerve deficit.      Motor: No weakness.   Psychiatric:         Mood and Affect: Mood normal.         Behavior: Behavior normal.               ED Course     Labs Reviewed   COMP METABOLIC PANEL (14) - Abnormal; Notable for the following components:       Result Value    Glucose 138 (*)     BUN 89 (*)     Creatinine 10.30 (*)     Calcium, Total 7.6 (*)     Calculated Osmolality 315 (*)     eGFR-Cr 6 (*)     A/G Ratio 0.9 (*)     All other components within normal limits   CBC W/ DIFFERENTIAL - Abnormal; Notable for the following components:    RBC 3.27 (*)     HGB 10.4 (*)     HCT 30.0 (*)     All other components within normal limits   PROTHROMBIN TIME (PT) - Normal   PTT, ACTIVATED - Normal   CBC WITH DIFFERENTIAL WITH PLATELET    Narrative:     The following orders were created for panel order CBC With Differential With Platelet.  Procedure                               Abnormality         Status                     ---------                               -----------         ------                     CBC W/ DIFFERENTIAL[[id number]]          Abnormal            Final result                 Please view results for these tests on the individual orders.   TYPE AND SCREEN    Narrative:     The following orders were created for panel order Type and screen.  Procedure                               Abnormality         Status                     ---------                               -----------         ------                     ABORH (Blood Type)[[id number]]                               Final result               Antibody Screen[[id number]]                                  Final result                  Please view results for these tests on the individual orders.   ABORH (BLOOD TYPE)   ANTIBODY SCREEN             CT ABDOMEN+PELVIS(CONTRAST ONLY)(CPT=74177)    Result Date: 4/22/2024  PROCEDURE:  CT ABDOMEN+PELVIS (CONTRAST ONLY) (CPT=74177)  COMPARISON:  [facility], CT, CT ABDOMEN+PELVIS(CPT=74176), 4/02/2024, 3:50 PM.  INDICATIONS:  GIB  TECHNIQUE:  CT scanning was performed from the dome of the diaphragm to the pubic symphysis with non-ionic intravenous contrast material. Post contrast coronal MPR imaging was performed.  Dose reduction techniques were used. Dose information is transmitted to the ACR (American College of Radiology) NRDR (National Radiology Data Registry) which includes the Dose Index Registry.  PATIENT STATED HISTORY:(As transcribed by Technologist)  Patient reports abdominal pain with rectal bleeding.   CONTRAST USED:  100cc of Isovue 370  FINDINGS:  LIVER:  No enlargement, atrophy, abnormal density, or significant focal lesion.  BILIARY:  No visible dilatation or calcification.  PANCREAS:  No lesion, fluid collection, ductal dilatation, or atrophy.  SPLEEN:  No enlargement or focal lesion.  KIDNEYS:  No mass, obstruction, or calcification.  ADRENALS:  No mass or enlargement.  AORTA/VASCULAR:  No aneurysm or dissection.  RETROPERITONEUM:  No mass or adenopathy.  BOWEL/MESENTERY:  No visible mass, obstruction, or bowel wall thickening.  Pancolonic diverticulosis without evidence of acute diverticulitis.  Normal appendix.  ABDOMINAL WALL:  No mass or hernia.  URINARY BLADDER:  Bladder wall likely accentuated by under distention. PELVIC NODES:  No adenopathy.  PELVIC ORGANS:  No visible mass.  Pelvic organs appropriate for patient age.  BONES:  No bony lesion or fracture.  Moderate disc and endplate degenerative change of the lumbosacral junction.  LUNG BASES:  Trace right pleural effusion. OTHER:  Negative.             CONCLUSION:   1. No acute process of the abdomen or pelvis.  2. Chronic  small right pleural effusion.   LOCATION:  [facility]   Dictated by (CST): Claude Singh MD on 4/22/2024 at 11:23 PM     Finalized by (CST): Claude Singh MD on 4/22/2024 at 11:29 PM       XR CHEST AP PORTABLE  (CPT=71045)    Result Date: 4/2/2024  PROCEDURE:  XR CHEST AP PORTABLE  (CPT=71045)  TECHNIQUE:  AP chest radiograph was obtained.  COMPARISON:  [facility], XR, XR CHEST AP PORTABLE  (CPT=71045), 3/16/2024, 0:50 AM.  INDICATIONS:  abd pain, rectal bleeding  PATIENT STATED HISTORY: (As transcribed by Technologist)  Sudden onset right side chest pain, short of breath and dizziness.    FINDINGS:   No sided pheresis catheter with tip at the SVC/right atrium.  Sequelae of valve replacement is noted.  Mild apically redistribution of pulmonary vasculature.  Blunting of the right costophrenic angle with small right pleural effusion is again suggested.  No pneumothorax.            CONCLUSION:  Mild pulmonary vascular congestion.  There is a small right pleural effusion that is likely stable.   LOCATION:  [id number]      Dictated by (CST): Ryan Hernández MD on 4/02/2024 at 5:01 PM     Finalized by (CST): Ryan Hernández MD on 4/02/2024 at 5:02 PM       CT ABDOMEN+PELVIS(CPT=74176)    Result Date: 4/2/2024  PROCEDURE:  CT ABDOMEN+PELVIS (CPT=74176)  COMPARISON:  [facility] , CT, CT ABDOMEN+PELVIS(CONTRAST ONLY)(CPT=74177), 3/23/2024, 4:48 PM.  INDICATIONS:  abd pain, rectal bleeding  TECHNIQUE:  Unenhanced multislice CT scanning was performed from the dome of the diaphragm to the pubic symphysis.  Dose reduction techniques were used. Dose information is transmitted to the ACR (American College of Radiology) NRDR (National Radiology Data Registry) which includes the Dose Index Registry.  PATIENT STATED HISTORY: (As transcribed by Technologist)  abd pain, rectal bleeding.    FINDINGS:  LIVER:  No enlargement, atrophy, abnormal density, or significant focal lesion.  BILIARY:  No visible dilatation or calcification.  PANCREAS:  No  lesion, fluid collection, ductal dilatation, or atrophy.  SPLEEN:  No enlargement or focal lesion.  KIDNEYS:  No mass, obstruction, or calcification.  ADRENALS:  No mass or enlargement.  AORTA/VASCULAR:    Unremarkable as seen on non-contrast imaging. RETROPERITONEUM:  No mass or adenopathy.  BOWEL/MESENTERY:  Diverticulosis of the colon without evidence of acute diverticulitis.  The appendix is normal. ABDOMINAL WALL:  No mass or hernia.  URINARY BLADDER:  No visible focal wall thickening, lesion, or calculus.  PELVIC NODES:  No adenopathy.  PELVIC ORGANS:  No visible mass.  Pelvic organs appropriate for patient age.  BONES:  Marked loss of L5-S1 disc space with endplate osteoarthritic changes. LUNG BASES:  Small right pleural effusion.  Associated atelectasis. OTHER:  Negative.             CONCLUSION:   Diverticulosis of the colon without evidence of acute diverticulitis.  The appendix is normal.  No evidence of an acute inflammatory process.  Small right pleural effusion with atelectasis.   LOCATION:  [place] [id number]   Dictated by (CST): Ryan Hernández MD on 4/02/2024 at 4:18 PM     Finalized by (CST): Ryan Hernández MD on 4/02/2024 at 4:21 PM               MDM      Social -negative tobacco, negative etoh, negative drugs  Family History-noncontributory  Past Medical History-bipolar disorder,  asthma, chronic kidney disease, diabetes, high cholesterol congestive heart disease, pulmonary embolism, TIA, coronary disease, COPD, depression, high blood pressure, end-stage renal disease on hemodialysis chronic rectal bleeding    Differential diagnosis before testing included hemorrhoids, colon polyps, diverticulitis, diverticulosis, bowel obstruction    Co-morbidities that add to the complexity of management include: History of chronic rectal bleeding, end-stage renal disease on hemodialysis    Testing ordered during this visit included CT scan of the abdomen with contrast to evaluate for rectal bleeding patient is  scheduled for dialysis tomorrow.  To baseline labs    Radiographic images  I personally reviewed the radiographs and my individual interpretation shows diverticulosis without evidence of diverticulitis  I also reviewed the official reports that showed CT ABDOMEN+PELVIS(CONTRAST ONLY)(CPT=74177)    Result Date: 4/22/2024  PROCEDURE:  CT ABDOMEN+PELVIS (CONTRAST ONLY) (CPT=74177)  COMPARISON:  [facility], CT, CT ABDOMEN+PELVIS(CPT=74176), 4/02/2024, 3:50 PM.  INDICATIONS:  GIB  TECHNIQUE:  CT scanning was performed from the dome of the diaphragm to the pubic symphysis with non-ionic intravenous contrast material. Post contrast coronal MPR imaging was performed.  Dose reduction techniques were used. Dose information is transmitted to the ACR (American College of Radiology) NRDR (National Radiology Data Registry) which includes the Dose Index Registry.  PATIENT STATED HISTORY:(As transcribed by Technologist)  Patient reports abdominal pain with rectal bleeding.   CONTRAST USED:  100cc of Isovue 370  FINDINGS:  LIVER:  No enlargement, atrophy, abnormal density, or significant focal lesion.  BILIARY:  No visible dilatation or calcification.  PANCREAS:  No lesion, fluid collection, ductal dilatation, or atrophy.  SPLEEN:  No enlargement or focal lesion.  KIDNEYS:  No mass, obstruction, or calcification.  ADRENALS:  No mass or enlargement.  AORTA/VASCULAR:  No aneurysm or dissection.  RETROPERITONEUM:  No mass or adenopathy.  BOWEL/MESENTERY:  No visible mass, obstruction, or bowel wall thickening.  Pancolonic diverticulosis without evidence of acute diverticulitis.  Normal appendix.  ABDOMINAL WALL:  No mass or hernia.  URINARY BLADDER:  Bladder wall likely accentuated by under distention. PELVIC NODES:  No adenopathy.  PELVIC ORGANS:  No visible mass.  Pelvic organs appropriate for patient age.  BONES:  No bony lesion or fracture.  Moderate disc and endplate degenerative change of the lumbosacral junction.  LUNG BASES:   Trace right pleural effusion. OTHER:  Negative.             CONCLUSION:   1. No acute process of the abdomen or pelvis.  2. Chronic small right pleural effusion.   LOCATION:  [facility]   Dictated by (CST): Claude Singh MD on 4/22/2024 at 11:23 PM     Finalized by (CST): Claude Singh MD on 4/22/2024 at 11:29 PM       XR CHEST AP PORTABLE  (CPT=71045)    Result Date: 4/2/2024  PROCEDURE:  XR CHEST AP PORTABLE  (CPT=71045)  TECHNIQUE:  AP chest radiograph was obtained.  COMPARISON:  [facility], XR, XR CHEST AP PORTABLE  (CPT=71045), 3/16/2024, 0:50 AM.  INDICATIONS:  abd pain, rectal bleeding  PATIENT STATED HISTORY: (As transcribed by Technologist)  Sudden onset right side chest pain, short of breath and dizziness.    FINDINGS:   No sided pheresis catheter with tip at the SVC/right atrium.  Sequelae of valve replacement is noted.  Mild apically redistribution of pulmonary vasculature.  Blunting of the right costophrenic angle with small right pleural effusion is again suggested.  No pneumothorax.            CONCLUSION:  Mild pulmonary vascular congestion.  There is a small right pleural effusion that is likely stable.   LOCATION:  [id number]      Dictated by (CST): Ryan Hernández MD on 4/02/2024 at 5:01 PM     Finalized by (CST): Ryan Hernández MD on 4/02/2024 at 5:02 PM       CT ABDOMEN+PELVIS(CPT=74176)    Result Date: 4/2/2024  PROCEDURE:  CT ABDOMEN+PELVIS (CPT=74176)  COMPARISON:  [facility] , CT, CT ABDOMEN+PELVIS(CONTRAST ONLY)(CPT=74177), 3/23/2024, 4:48 PM.  INDICATIONS:  abd pain, rectal bleeding  TECHNIQUE:  Unenhanced multislice CT scanning was performed from the dome of the diaphragm to the pubic symphysis.  Dose reduction techniques were used. Dose information is transmitted to the ACR (American College of Radiology) NRDR (National Radiology Data Registry) which includes the Dose Index Registry.  PATIENT STATED HISTORY: (As transcribed by Technologist)  abd pain, rectal bleeding.    FINDINGS:  LIVER:  No  enlargement, atrophy, abnormal density, or significant focal lesion.  BILIARY:  No visible dilatation or calcification.  PANCREAS:  No lesion, fluid collection, ductal dilatation, or atrophy.  SPLEEN:  No enlargement or focal lesion.  KIDNEYS:  No mass, obstruction, or calcification.  ADRENALS:  No mass or enlargement.  AORTA/VASCULAR:    Unremarkable as seen on non-contrast imaging. RETROPERITONEUM:  No mass or adenopathy.  BOWEL/MESENTERY:  Diverticulosis of the colon without evidence of acute diverticulitis.  The appendix is normal. ABDOMINAL WALL:  No mass or hernia.  URINARY BLADDER:  No visible focal wall thickening, lesion, or calculus.  PELVIC NODES:  No adenopathy.  PELVIC ORGANS:  No visible mass.  Pelvic organs appropriate for patient age.  BONES:  Marked loss of L5-S1 disc space with endplate osteoarthritic changes. LUNG BASES:  Small right pleural effusion.  Associated atelectasis. OTHER:  Negative.             CONCLUSION:   Diverticulosis of the colon without evidence of acute diverticulitis.  The appendix is normal.  No evidence of an acute inflammatory process.  Small right pleural effusion with atelectasis.   LOCATION:  [place] [address]   Dictated by (CST): Ryan Hernández MD on 4/02/2024 at 4:18 PM     Finalized by (CST): Ryan Hernández MD on 4/02/2024 at 4:21 PM          External chart review showed review of care everywhere in epic system shows no blood thinners patient is on dialysis receives dialysis Tuesday Thursday Saturdays.  His most recent hemoglobin level was 11.6 on April 3.    History obtained by an independent source included from patient    Discussion of management with patient    Social determinants of health that affect care include patient is scheduled to see surgery on Friday for patient for evaluation of this chronic condition      Medications Provided: IV fluids, Dilaudid    Course of Events during Emergency Room Visit include 44-year-old male presents emergency room for  evaluation of rectal bleeding.  The patient reports has been going on for years he is scheduled to see surgery regarding this on Friday.  He has had a colonoscopy as recently as 3 weeks ago.  Patient has stable vital signs at this time we will check baseline labs to get a CT scan he will need his dialysis as scheduled tomorrow.  Will give IV pain medications here he states he can get a ride home if he is able to be discharged tonight.  Patient be followed up with primary care physician and surgery as scheduled    Patient's hemoglobin level is normal his vital signs are stable    Patient CT scan shows no acute process other than panic diverticulosis without evidence of diverticulitis.  He is following up with general surgery this week with Dr. Barnes.  Patient states he still having abdominal pain I recommended Bentyl he says it does not work for him.  At this time I would not recommend narcotics as it was slow transit admission through the abdomen I will also give him a prescription for Colace along with Tucks pads and Anusol cream to help with hemorrhoids.  Patient to follow-up with primary care physician and general surgery as scheduled  Disposition:        Discharge  I have discussed with the patient the results of test, differential diagnosis, treatment plan, warning signs and symptoms which should prompt immediate return.  They expressed understanding of these instructions and agrees to the following plan provided.  They were given written discharge instructions and agrees to return for any concerns and voiced understanding and all questions were answered.                                      Medical Decision Making      Disposition and Plan     Clinical Impression:  1. Rectal bleeding    2. ESRD on hemodialysis (HCC)    3. Hypertension, unspecified type    4. Anemia, unspecified type         Disposition:  Discharge  4/22/2024 11:35 pm    Follow-up:  Prieto Novak MD  [address]  [address]  [facility]  [address]  [phone]    Schedule an appointment as soon as possible for a visit      Kam Delgado MD  [address] [place] [address]  [facility] [address]  [phone]    Schedule an appointment as soon as possible for a visit            Medications Prescribed:  Current Discharge Medication List        START taking these medications    Details   hydrocortisone 2.5 % External Cream Place 1 Application rectally 2 (two) times daily. Apply topically to negin-rectal area  Qty: 28 g, Refills: 2      witch hazel-glycerin External Pads Place 1 Application rectally as needed for Pain or Hemmorrhoids.  Qty: 30 each, Refills: 3      docusate sodium 100 MG Oral Cap Take 1 capsule (100 mg total) by mouth 2 (two) times daily as needed for constipation.  Qty: 60 capsule, Refills: 0

## 2024-05-05 ENCOUNTER — HOSPITAL ENCOUNTER (INPATIENT)
Facility: HOSPITAL | Age: 44
LOS: 4 days | Discharge: HOME | DRG: 699 | End: 2024-05-09
Attending: EMERGENCY MEDICINE | Admitting: HOSPITALIST
Payer: MEDICARE

## 2024-05-05 ENCOUNTER — APPOINTMENT (OUTPATIENT)
Dept: CT IMAGING | Facility: HOSPITAL | Age: 44
DRG: 699 | End: 2024-05-05
Attending: EMERGENCY MEDICINE
Payer: MEDICARE

## 2024-05-05 DIAGNOSIS — R10.32 ABDOMINAL PAIN, LEFT LOWER QUADRANT: Primary | ICD-10-CM

## 2024-05-05 DIAGNOSIS — I1A.0 RESISTANT HYPERTENSION: ICD-10-CM

## 2024-05-05 DIAGNOSIS — N18.6 ESRD (END STAGE RENAL DISEASE) (HCC): ICD-10-CM

## 2024-05-05 DIAGNOSIS — R10.30 LOWER ABDOMINAL PAIN: ICD-10-CM

## 2024-05-05 LAB
ALBUMIN SERPL-MCNC: 4 G/DL (ref 3.4–5)
ALBUMIN/GLOB SERPL: 1 {RATIO} (ref 1–2)
ALP LIVER SERPL-CCNC: 110 U/L
ALT SERPL-CCNC: 21 U/L
ANION GAP SERPL CALC-SCNC: 9 MMOL/L (ref 0–18)
AST SERPL-CCNC: 32 U/L (ref 15–37)
BASOPHILS # BLD AUTO: 0.1 X10(3) UL (ref 0–0.2)
BASOPHILS NFR BLD AUTO: 1.1 %
BILIRUB SERPL-MCNC: 0.5 MG/DL (ref 0.1–2)
BUN BLD-MCNC: 64 MG/DL (ref 9–23)
CALCIUM BLD-MCNC: 8.5 MG/DL (ref 8.5–10.1)
CHLORIDE SERPL-SCNC: 106 MMOL/L (ref 98–112)
CO2 SERPL-SCNC: 26 MMOL/L (ref 21–32)
CREAT BLD-MCNC: 9.29 MG/DL
EGFRCR SERPLBLD CKD-EPI 2021: 6 ML/MIN/1.73M2 (ref 60–?)
EOSINOPHIL # BLD AUTO: 0.3 X10(3) UL (ref 0–0.7)
EOSINOPHIL NFR BLD AUTO: 3.4 %
ERYTHROCYTE [DISTWIDTH] IN BLOOD BY AUTOMATED COUNT: 13 %
GLOBULIN PLAS-MCNC: 4 G/DL (ref 2.8–4.4)
GLUCOSE BLD-MCNC: 122 MG/DL (ref 70–99)
GLUCOSE BLD-MCNC: 129 MG/DL (ref 70–99)
GLUCOSE BLD-MCNC: 150 MG/DL (ref 70–99)
GLUCOSE BLD-MCNC: 153 MG/DL (ref 70–99)
HCT VFR BLD AUTO: 30.7 %
HGB BLD-MCNC: 10.8 G/DL
IMM GRANULOCYTES # BLD AUTO: 0.04 X10(3) UL (ref 0–1)
IMM GRANULOCYTES NFR BLD: 0.5 %
LIPASE SERPL-CCNC: 137 U/L (ref 13–75)
LYMPHOCYTES # BLD AUTO: 1.47 X10(3) UL (ref 1–4)
LYMPHOCYTES NFR BLD AUTO: 16.6 %
MCH RBC QN AUTO: 32 PG (ref 26–34)
MCHC RBC AUTO-ENTMCNC: 35.2 G/DL (ref 31–37)
MCV RBC AUTO: 91.1 FL
MONOCYTES # BLD AUTO: 0.78 X10(3) UL (ref 0.1–1)
MONOCYTES NFR BLD AUTO: 8.8 %
NEUTROPHILS # BLD AUTO: 6.14 X10 (3) UL (ref 1.5–7.7)
NEUTROPHILS # BLD AUTO: 6.14 X10(3) UL (ref 1.5–7.7)
NEUTROPHILS NFR BLD AUTO: 69.6 %
OSMOLALITY SERPL CALC.SUM OF ELEC: 313 MOSM/KG (ref 275–295)
PLATELET # BLD AUTO: 256 10(3)UL (ref 150–450)
POTASSIUM SERPL-SCNC: 4.3 MMOL/L (ref 3.5–5.1)
PROT SERPL-MCNC: 8 G/DL (ref 6.4–8.2)
RBC # BLD AUTO: 3.37 X10(6)UL
SODIUM SERPL-SCNC: 141 MMOL/L (ref 136–145)
WBC # BLD AUTO: 8.8 X10(3) UL (ref 4–11)

## 2024-05-05 PROCEDURE — 99285 EMERGENCY DEPT VISIT HI MDM: CPT

## 2024-05-05 PROCEDURE — 5A1D70Z PERFORMANCE OF URINARY FILTRATION, INTERMITTENT, LESS THAN 6 HOURS PER DAY: ICD-10-PCS | Performed by: INTERNAL MEDICINE

## 2024-05-05 PROCEDURE — 96374 THER/PROPH/DIAG INJ IV PUSH: CPT

## 2024-05-05 PROCEDURE — 80053 COMPREHEN METABOLIC PANEL: CPT | Performed by: EMERGENCY MEDICINE

## 2024-05-05 PROCEDURE — 83690 ASSAY OF LIPASE: CPT

## 2024-05-05 PROCEDURE — 96376 TX/PRO/DX INJ SAME DRUG ADON: CPT

## 2024-05-05 PROCEDURE — 96375 TX/PRO/DX INJ NEW DRUG ADDON: CPT

## 2024-05-05 PROCEDURE — 90935 HEMODIALYSIS ONE EVALUATION: CPT | Performed by: INTERNAL MEDICINE

## 2024-05-05 PROCEDURE — 85025 COMPLETE CBC W/AUTO DIFF WBC: CPT | Performed by: EMERGENCY MEDICINE

## 2024-05-05 PROCEDURE — 85025 COMPLETE CBC W/AUTO DIFF WBC: CPT

## 2024-05-05 PROCEDURE — 74176 CT ABD & PELVIS W/O CONTRAST: CPT | Performed by: EMERGENCY MEDICINE

## 2024-05-05 PROCEDURE — 83690 ASSAY OF LIPASE: CPT | Performed by: EMERGENCY MEDICINE

## 2024-05-05 PROCEDURE — 82962 GLUCOSE BLOOD TEST: CPT

## 2024-05-05 PROCEDURE — 80053 COMPREHEN METABOLIC PANEL: CPT

## 2024-05-05 RX ORDER — BISACODYL 5 MG/1
15 TABLET, DELAYED RELEASE ORAL DAILY
Status: DISCONTINUED | OUTPATIENT
Start: 2024-05-05 | End: 2024-05-09

## 2024-05-05 RX ORDER — DEXTROSE MONOHYDRATE 25 G/50ML
50 INJECTION, SOLUTION INTRAVENOUS
Status: DISCONTINUED | OUTPATIENT
Start: 2024-05-05 | End: 2024-05-09

## 2024-05-05 RX ORDER — HYDRALAZINE HYDROCHLORIDE 20 MG/ML
20 INJECTION INTRAMUSCULAR; INTRAVENOUS ONCE
Status: COMPLETED | OUTPATIENT
Start: 2024-05-05 | End: 2024-05-05

## 2024-05-05 RX ORDER — ARIPIPRAZOLE 5 MG/1
15 TABLET ORAL DAILY
Status: DISCONTINUED | OUTPATIENT
Start: 2024-05-05 | End: 2024-05-05

## 2024-05-05 RX ORDER — ARIPIPRAZOLE 5 MG/1
5 TABLET ORAL DAILY
COMMUNITY

## 2024-05-05 RX ORDER — NICOTINE POLACRILEX 4 MG
30 LOZENGE BUCCAL
Status: DISCONTINUED | OUTPATIENT
Start: 2024-05-05 | End: 2024-05-09

## 2024-05-05 RX ORDER — DICYCLOMINE HYDROCHLORIDE 10 MG/1
10 CAPSULE ORAL 3 TIMES DAILY PRN
Status: DISCONTINUED | OUTPATIENT
Start: 2024-05-05 | End: 2024-05-06

## 2024-05-05 RX ORDER — ISOSORBIDE MONONITRATE 30 MG/1
30 TABLET, EXTENDED RELEASE ORAL DAILY
Status: DISCONTINUED | OUTPATIENT
Start: 2024-05-05 | End: 2024-05-09

## 2024-05-05 RX ORDER — LABETALOL HYDROCHLORIDE 5 MG/ML
20 INJECTION, SOLUTION INTRAVENOUS ONCE
Status: COMPLETED | OUTPATIENT
Start: 2024-05-05 | End: 2024-05-05

## 2024-05-05 RX ORDER — ONDANSETRON 2 MG/ML
4 INJECTION INTRAMUSCULAR; INTRAVENOUS EVERY 6 HOURS PRN
Status: DISCONTINUED | OUTPATIENT
Start: 2024-05-05 | End: 2024-05-09

## 2024-05-05 RX ORDER — HYDRALAZINE HYDROCHLORIDE 50 MG/1
50 TABLET, FILM COATED ORAL 2 TIMES DAILY
Status: DISCONTINUED | OUTPATIENT
Start: 2024-05-05 | End: 2024-05-09

## 2024-05-05 RX ORDER — LABETALOL HYDROCHLORIDE 5 MG/ML
20 INJECTION, SOLUTION INTRAVENOUS EVERY 4 HOURS PRN
Status: DISCONTINUED | OUTPATIENT
Start: 2024-05-05 | End: 2024-05-09

## 2024-05-05 RX ORDER — HYDROMORPHONE HYDROCHLORIDE 1 MG/ML
0.5 INJECTION, SOLUTION INTRAMUSCULAR; INTRAVENOUS; SUBCUTANEOUS EVERY 30 MIN PRN
Status: DISCONTINUED | OUTPATIENT
Start: 2024-05-05 | End: 2024-05-05

## 2024-05-05 RX ORDER — HEPARIN SODIUM 1000 [USP'U]/ML
5000 INJECTION, SOLUTION INTRAVENOUS; SUBCUTANEOUS
Status: DISCONTINUED | OUTPATIENT
Start: 2024-05-05 | End: 2024-05-09

## 2024-05-05 RX ORDER — NIFEDIPINE 30 MG/1
60 TABLET, EXTENDED RELEASE ORAL 2 TIMES DAILY
Status: DISCONTINUED | OUTPATIENT
Start: 2024-05-05 | End: 2024-05-09

## 2024-05-05 RX ORDER — CLONIDINE HYDROCHLORIDE 0.1 MG/1
0.1 TABLET ORAL DAILY
Status: DISCONTINUED | OUTPATIENT
Start: 2024-05-05 | End: 2024-05-09

## 2024-05-05 RX ORDER — ACETAMINOPHEN 500 MG
500 TABLET ORAL EVERY 4 HOURS PRN
Status: DISCONTINUED | OUTPATIENT
Start: 2024-05-05 | End: 2024-05-09

## 2024-05-05 RX ORDER — ENOXAPARIN SODIUM 100 MG/ML
40 INJECTION SUBCUTANEOUS DAILY
Status: DISCONTINUED | OUTPATIENT
Start: 2024-05-05 | End: 2024-05-05 | Stop reason: ALTCHOICE

## 2024-05-05 RX ORDER — SENNOSIDES 8.6 MG
17.2 TABLET ORAL NIGHTLY PRN
Status: DISCONTINUED | OUTPATIENT
Start: 2024-05-05 | End: 2024-05-09

## 2024-05-05 RX ORDER — NICOTINE POLACRILEX 4 MG
15 LOZENGE BUCCAL
Status: DISCONTINUED | OUTPATIENT
Start: 2024-05-05 | End: 2024-05-09

## 2024-05-05 RX ORDER — ALBUTEROL SULFATE 90 UG/1
2 AEROSOL, METERED RESPIRATORY (INHALATION) EVERY 4 HOURS PRN
Status: DISCONTINUED | OUTPATIENT
Start: 2024-05-05 | End: 2024-05-09

## 2024-05-05 RX ORDER — DOCUSATE SODIUM 100 MG/1
100 CAPSULE, LIQUID FILLED ORAL 2 TIMES DAILY PRN
Status: DISCONTINUED | OUTPATIENT
Start: 2024-05-05 | End: 2024-05-09

## 2024-05-05 RX ORDER — ARIPIPRAZOLE 5 MG/1
5 TABLET ORAL DAILY
Status: DISCONTINUED | OUTPATIENT
Start: 2024-05-06 | End: 2024-05-09

## 2024-05-05 RX ORDER — HYDROMORPHONE HYDROCHLORIDE 1 MG/ML
0.8 INJECTION, SOLUTION INTRAMUSCULAR; INTRAVENOUS; SUBCUTANEOUS EVERY 2 HOUR PRN
Status: DISCONTINUED | OUTPATIENT
Start: 2024-05-05 | End: 2024-05-09

## 2024-05-05 RX ORDER — FLUTICASONE PROPIONATE 50 MCG
1 SPRAY, SUSPENSION (ML) NASAL 2 TIMES DAILY
Status: DISCONTINUED | OUTPATIENT
Start: 2024-05-05 | End: 2024-05-09

## 2024-05-05 RX ORDER — BISACODYL 10 MG
10 SUPPOSITORY, RECTAL RECTAL
Status: DISCONTINUED | OUTPATIENT
Start: 2024-05-05 | End: 2024-05-09

## 2024-05-05 RX ORDER — DEXTROAMPHETAMINE SACCHARATE, AMPHETAMINE ASPARTATE, DEXTROAMPHETAMINE SULFATE AND AMPHETAMINE SULFATE 1.25; 1.25; 1.25; 1.25 MG/1; MG/1; MG/1; MG/1
10 TABLET ORAL
Status: DISCONTINUED | OUTPATIENT
Start: 2024-05-05 | End: 2024-05-09

## 2024-05-05 RX ORDER — DIPHENHYDRAMINE HCL 25 MG
25 CAPSULE ORAL EVERY 4 HOURS PRN
Status: DISCONTINUED | OUTPATIENT
Start: 2024-05-05 | End: 2024-05-09

## 2024-05-05 RX ORDER — HEPARIN SODIUM 5000 [USP'U]/ML
5000 INJECTION, SOLUTION INTRAVENOUS; SUBCUTANEOUS EVERY 8 HOURS SCHEDULED
Status: DISCONTINUED | OUTPATIENT
Start: 2024-05-05 | End: 2024-05-09

## 2024-05-05 RX ORDER — CARVEDILOL 12.5 MG/1
25 TABLET ORAL 2 TIMES DAILY WITH MEALS
Status: DISCONTINUED | OUTPATIENT
Start: 2024-05-05 | End: 2024-05-09

## 2024-05-05 RX ORDER — POLYETHYLENE GLYCOL 3350 17 G/17G
17 POWDER, FOR SOLUTION ORAL 2 TIMES DAILY
Status: DISCONTINUED | OUTPATIENT
Start: 2024-05-05 | End: 2024-05-09

## 2024-05-05 RX ORDER — HYDROMORPHONE HYDROCHLORIDE 1 MG/ML
1.2 INJECTION, SOLUTION INTRAMUSCULAR; INTRAVENOUS; SUBCUTANEOUS EVERY 2 HOUR PRN
Status: DISCONTINUED | OUTPATIENT
Start: 2024-05-05 | End: 2024-05-09

## 2024-05-05 RX ORDER — LOSARTAN POTASSIUM 100 MG/1
100 TABLET ORAL DAILY
Status: DISCONTINUED | OUTPATIENT
Start: 2024-05-05 | End: 2024-05-09

## 2024-05-05 RX ORDER — HYDRALAZINE HYDROCHLORIDE 20 MG/ML
10 INJECTION INTRAMUSCULAR; INTRAVENOUS EVERY 4 HOURS PRN
Status: DISCONTINUED | OUTPATIENT
Start: 2024-05-05 | End: 2024-05-09

## 2024-05-05 RX ORDER — FENOFIBRATE 134 MG/1
1 CAPSULE ORAL NIGHTLY
Status: DISCONTINUED | OUTPATIENT
Start: 2024-05-05 | End: 2024-05-05

## 2024-05-05 RX ORDER — PROCHLORPERAZINE EDISYLATE 5 MG/ML
5 INJECTION INTRAMUSCULAR; INTRAVENOUS EVERY 8 HOURS PRN
Status: DISCONTINUED | OUTPATIENT
Start: 2024-05-05 | End: 2024-05-09

## 2024-05-05 RX ORDER — DIPHENHYDRAMINE HYDROCHLORIDE 50 MG/ML
12.5 INJECTION INTRAMUSCULAR; INTRAVENOUS EVERY 4 HOURS PRN
Status: DISCONTINUED | OUTPATIENT
Start: 2024-05-05 | End: 2024-05-09

## 2024-05-05 RX ORDER — SEVELAMER CARBONATE 800 MG/1
800 TABLET, FILM COATED ORAL
Status: DISCONTINUED | OUTPATIENT
Start: 2024-05-05 | End: 2024-05-09

## 2024-05-05 RX ORDER — FLUOXETINE HYDROCHLORIDE 20 MG/1
40 CAPSULE ORAL DAILY
Status: DISCONTINUED | OUTPATIENT
Start: 2024-05-05 | End: 2024-05-09

## 2024-05-05 RX ORDER — POLYETHYLENE GLYCOL 3350 17 G/17G
17 POWDER, FOR SOLUTION ORAL DAILY PRN
Status: DISCONTINUED | OUTPATIENT
Start: 2024-05-05 | End: 2024-05-05

## 2024-05-05 RX ORDER — BUPROPION HYDROCHLORIDE 150 MG/1
150 TABLET ORAL DAILY
Status: DISCONTINUED | OUTPATIENT
Start: 2024-05-05 | End: 2024-05-09

## 2024-05-05 RX ORDER — ONDANSETRON 2 MG/ML
4 INJECTION INTRAMUSCULAR; INTRAVENOUS ONCE
Status: COMPLETED | OUTPATIENT
Start: 2024-05-05 | End: 2024-05-05

## 2024-05-05 RX ORDER — HYDROMORPHONE HYDROCHLORIDE 1 MG/ML
0.4 INJECTION, SOLUTION INTRAMUSCULAR; INTRAVENOUS; SUBCUTANEOUS EVERY 2 HOUR PRN
Status: DISCONTINUED | OUTPATIENT
Start: 2024-05-05 | End: 2024-05-09

## 2024-05-05 NOTE — ED QUICK NOTES
Orders for admission, patient is aware of plan and ready to go upstairs. Any questions, please call ED RN Geremias at extension [phone].     Patient Covid vaccination status: Fully vaccinated     COVID Test Ordered in ED: None    COVID Suspicion at Admission: N/A    Running Infusions:  None    Mental Status/LOC at time of transport: A&Ox4/4    Other pertinent information:   CIWA score: N/A   NIH score:  N/A

## 2024-05-05 NOTE — PROGRESS NOTES
A&Ox4. VSS. RA. .  Telemetry: NSR  GI: Abdomen soft, nondistended with tenderness in LLQ.  Nausea controlled with Zofran.  : Voids, dialysis  Pain controlled with PRN pain medications  Independent  Diet: General  Saline locked.  All appropriate safety measures in place. All questions and concerns addressed.

## 2024-05-05 NOTE — ED PROVIDER NOTES
Patient Seen in: [facility] Emergency Department      History     Chief Complaint   Patient presents with    Abdomen/Flank Pain    GI Bleeding     Stated Complaint: Abdominal pain and rectal bleeding. States that it has been ongoing. +N/V    Subjective:   HPI    44-year-old male with history of end-stage renal disease presents for evaluation of recurrent left lower quadrant abdominal pain flaring over the past 2 days.  Patient has a history of same recurrently in the past related to diverticular disease.  He has been admitted several times in recent months for the same.  However usually he is told that the diverticular disease does not require antibiotics because it is not inflamed and that he may be having some minor GI bleeding related to internal hemorrhoids.  Patient continues to have some streaks of blood in his stool since previous admission, but not worse.  Patient missed his dialysis yesterday at [facility] due to his pain    Objective:   Past Medical History:    Asthma ([facility])    Attention deficit hyperactivity disorder (ADHD)    Back problem    Bipolar 1 disorder ([facility])    Cancer ([facility])    CKD (chronic kidney disease) stage 3, GFR 30-59 ml/min ([facility])    Dr Meeks    Congestive heart disease ([facility])    COPD (chronic obstructive pulmonary disease) ([facility])    Coronary atherosclerosis    Deep vein thrombosis ([facility])    at age 17 R/T cast    Depression    Diabetes ([facility])    Essential hypertension    3/21 echo: severe concentric LVH with normal EF and no MR or pHTN    Extrinsic asthma, unspecified    Heart attack ([facility])    2016- angiogram- no intervention    Heart valve disease    mitral valve repair in 1994/    High blood pressure    High cholesterol    History of mitral valve repair    Hyperlipidemia    Low back pain    tight and stiff after sweeping and mopping    LVH (left ventricular hypertrophy)    Migraines    Mixed hyperlipidemia     HDL 38 LDL 97 VLDL 57     Monoclonal gammopathy    IgG  kappa     MVP (mitral valve prolapse)    Repair  at [place]; echoes as recently as 3/21 show mild or trivial MR and no stenosis    Neuropathy    Osteoarthritis    hip ,knees    Pneumonia due to organism    Pulmonary embolism (HCC)    Renal disorder    TIA (transient ischemic attack)    Initial history of left-sided weakness and slurred speech. (+) cocaine. MRI of the brain, CT angiogram of the head and neck, and 2D echo are all unremarkable.     TMJ (dislocation of temporomandibular joint)    Troponin level elevated    Trop 60 60 47 with TIA and no CP: Lexiscan negative with EF 51              Past Surgical History:   Procedure Laterality Date    Colonoscopy N/A 2023    Procedure: COLONOSCOPY;  Surgeon: Heath Vu MD;  Location:  ENDOSCOPY    Colonoscopy N/A 2023    Procedure: COLONOSCOPY with cold snare polypectomy and forcep polypectomy;  Surgeon: Ousmane Suarez MD;  Location:  ENDOSCOPY    Colonoscopy & polypectomy      [place]  2019    Duodenitis. Biopsied. EUS for weight loss was negative    Heart surgery      Hernia surgery  2022    Dr Barnes    Laminectomy,>2 sgmt,lumbar  2018    L4-L5 Decomp Discectomy ROEM L4-L5    Mitralplasty w cp bypass      [place]: Repair    Repair rotator cuff,chronic Left     torn and had a ruptured bicep    Valve repair      mitral valve                Social History     Socioeconomic History    Marital status:    Tobacco Use    Smoking status: Former     Current packs/day: 0.00     Average packs/day: 1 pack/day for 27.0 years (27.0 ttl pk-yrs)     Types: Cigarettes     Start date: 1995     Quit date: 2022     Years since quittin.1    Smokeless tobacco: Never   Vaping Use    Vaping status: Never Used   Substance and Sexual Activity    Alcohol use: No    Drug use: No     Social Determinants of Health     Food Insecurity: No Food Insecurity (2024)    Food Insecurity     Food Insecurity: Never true   Transportation  Needs: No Transportation Needs (4/2/2024)    Transportation Needs     Lack of Transportation: No   Housing Stability: Low Risk  (4/2/2024)    Housing Stability     Housing Instability: No              Review of Systems    Positive for stated complaint: Abdominal pain and rectal bleeding. States that it has been ongoing. +N/V  Other systems are as noted in HPI.  Constitutional and vital signs reviewed.      All other systems reviewed and negative except as noted above.    Physical Exam     ED Triage Vitals [05/05/24 0352]   BP (S) (!) 196/120   Pulse 99   Resp 20   Temp 98.1 °F (36.7 °C)   Temp src Temporal   SpO2 99 %   O2 Device None (Room air)       Current:BP (S) (!) 195/120   Pulse 86   Temp 98.1 °F (36.7 °C) (Temporal)   Resp 19   Ht 188 cm (6' 2\")   Wt 108 kg   SpO2 99%   BMI 30.56 kg/m²         Physical Exam    General: Alert, oriented, no apparent distress  HEENT: Atraumatic, normocephalic.  Pupils equal reactive.  Extraocular motions intact.  Neck: Supple  Lungs: Clear to auscultation bilaterally.  Heart: Regular rate and rhythm.  Abdomen: Soft, nontender.   Skin: No rash.  No edema.  Neurologic: No focal neurologic deficits.  Normal speech pattern  Musculoskeletal: No tenderness or deformity noted.  Full range of motion throughout.        ED Course     Labs Reviewed   COMP METABOLIC PANEL (14) - Abnormal; Notable for the following components:       Result Value    Glucose 153 (*)     BUN 64 (*)     Creatinine 9.29 (*)     Calculated Osmolality 313 (*)     eGFR-Cr 6 (*)     All other components within normal limits   LIPASE - Abnormal; Notable for the following components:    Lipase 137 (*)     All other components within normal limits   CBC W/ DIFFERENTIAL - Abnormal; Notable for the following components:    RBC 3.37 (*)     HGB 10.8 (*)     HCT 30.7 (*)     All other components within normal limits   CBC WITH DIFFERENTIAL WITH PLATELET    Narrative:     The following orders were created for panel  order CBC With Differential With Platelet.  Procedure                               Abnormality         Status                     ---------                               -----------         ------                     CBC W/ DIFFERENTIAL[[id number]]          Abnormal            Final result                 Please view results for these tests on the individual orders.   URINALYSIS WITH CULTURE REFLEX   RAINBOW DRAW LAVENDER   RAINBOW DRAW LIGHT GREEN   RAINBOW DRAW BLUE                      MDM        Differential diagnosis includes diverticulitis, colitis, small bowel obstruction      Chemistry with normal electrolytes and chronic kidney disease.    CBC with hemoglobin of 10.8 which is baseline for the patient        Medications   HYDROmorphone (Dilaudid) 1 MG/ML injection 0.5 mg (0.5 mg Intravenous Given 5/5/24 4547)   hydrALAzine (Apresoline) 20 mg/mL injection 20 mg (has no administration in time range)   ondansetron (Zofran) 4 MG/2ML injection 4 mg (4 mg Intravenous Given 5/5/24 4653)   labetalol (Trandate) 5 mg/mL injection 20 mg (20 mg Intravenous Given 5/5/24 5319)     CT scan shows no appendicitis, diverticulitis, bowel obstruction or AAA.      Case management and then primary nephrologist in [place] called to get the patient an appointment with [facility] later today or tomorrow as he missed his appointment yesterday       No ability to get the patient an appointment prior to Tuesday.  Spoke with Dr. Page who will admit here for dialysis     Spoke with Dr Ryder for admission       Medical Decision Making  Amount and/or Complexity of Data Reviewed  External Data Reviewed: notes.     Details: Previous admissions from earlier this year and recent ER visit from 4/22        Disposition and Plan     Clinical Impression:  1. Abdominal pain, left lower quadrant    2. ESRD (end stage renal disease) (HCC)    3. Resistant hypertension         Disposition:  There is no disposition on file for this visit.  There is no  disposition time on file for this visit.    Follow-up:  Prieto Novak MD  [id number]  [address]  [facility] [id number] [phone]    Call in 1 day(s)  As needed          Medications Prescribed:  Current Discharge Medication List

## 2024-05-05 NOTE — ED INITIAL ASSESSMENT (HPI)
Pt drove himself to the ED today for c/o LLQ x 1 wk. Pt also c/o rectal bleeding, N/V-3x. Pt states he missed his hemodialysis yesterday due to his symptoms. HD schedule: Tue/Thu/Sat.

## 2024-05-05 NOTE — CM/SW NOTE
Per TERRENCE, patient is a dialysis patient with [facility] in [place] on Tues/Thurs/Sat and missed his dialysis yesterday, Saturday 5/4/24, due to his abdominal pain.  TERRENCE requesting that EDCM assist patient in scheduling dialysis for today, Sunday 5/5/24.    EDCM called [facility] at [phone] and voicemail stated that their hours are Mon - Fri 8am-8pm and Sat 10a-2p.  They are not open on a Sunday.    EDCM also called the [facility] help line at [phone] and received same voicemail as above with hours for week days but not Sunday.    EDCM spoke with Annie GOMEZ at [place] and informed her of above.   Annie GOMEZ also stated that patient said he would not be able to reschedule an appt from Tuesday to Monday.  EDCM would not be able to confirm this without speaking with someone at the outpatient clinic, and that would not be until tomorrow, Monday 5/6/24.      RN will update EDCM with plan of care if any other assistance is needed.

## 2024-05-05 NOTE — H&P
[facility]   part of [facility]    History and Physical     Godwin Fonseca Patient Status:  Inpatient    1980 MRN [id number]   Location [facility] 7NE-A Attending Jhonny Rebolledo MD   Hosp Day # 0 PCP Prieto Novak MD     Chief Complaint: missed HD    History of Present Illness: Godwin Fonseca is a 44 year old male with history of asthma, ADD, bipolar 1, end-stage renal disease on dialysis, COPD, CHF, coronary disease, history of DVT and PE, depression, type 2 diabetes, hypertension, hyperlipidemia, migraines, neuropathy who presented with missed dialysis.  Normally is dialyzed on  and missed yesterday session.  Emergency room was not able to coordinate outpatient dialysis otherwise over the weekend, therefore patient is getting admitted.  His blood pressure also noted to be significantly elevated.  He did not take his blood pressure medicine this morning although did take it last night.    Patient has had a history of persistent recurrent abdominal pain that he reports has occurred ever since his peritoneal dialysis catheter was placed a year and a half ago and then removed.  He reports that he has intermittent bloody bowel movements where the toilet water is red and that when he wipes he gets dark clots.  Does not seem to be mixed in with the stool.  He also reports having early satiety and some difficulty swallowing, does note that he was told in the past he had a Schatzki's ring.  Patient typically takes tramadol for pain at home but feels that this does nothing.  He reports also taking some medications for abdominal spasm but does not feel like these help.  He has had multiple evaluations for this abdominal pain including imaging studies and endoscopies but these have been nonrevealing.    During his last hospitalization he was noted to have bloody bowel movement prior to admission and was seen by a GI and was instructed to follow-up with surgery for hemorrhoid  management.  He does have a appointment with general surgery this upcoming week.  He had a colonoscopy last year and had most recently of flex sigmoidoscopy March 25 of this year showing hemorrhoids and no bleeding through to the sigmoid.  GI notes do indicate a component of chronic constipation that improves with stool softeners and MiraLAX    Past Medical History:  Past Medical History:    Asthma ([facility])    Attention deficit hyperactivity disorder (ADHD)    Back problem    Bipolar 1 disorder ([facility])    Cancer ([facility])    CKD (chronic kidney disease) stage 3, GFR 30-59 ml/min ([facility])    Dr Meeks    Congestive heart disease ([facility])    COPD (chronic obstructive pulmonary disease) ([facility])    Coronary atherosclerosis    Deep vein thrombosis ([facility])    at age 17 R/T cast    Depression    Diabetes ([facility])    Essential hypertension    3/21 echo: severe concentric LVH with normal EF and no MR or pHTN    Extrinsic asthma, unspecified    Heart attack ([facility])    2016- angiogram- no intervention    Heart valve disease    mitral valve repair in 1994/    High blood pressure    High cholesterol    History of mitral valve repair    Hyperlipidemia    Low back pain    tight and stiff after sweeping and mopping    LVH (left ventricular hypertrophy)    Migraines    Mixed hyperlipidemia     HDL 38 LDL 97 VLDL 57     Monoclonal gammopathy    IgG kappa     MVP (mitral valve prolapse)    Repair 1994 at [place]; echoes as recently as 3/21 show mild or trivial MR and no stenosis    Neuropathy    Osteoarthritis    hip ,knees    Pneumonia due to organism    Pulmonary embolism ([facility])    Renal disorder    TIA (transient ischemic attack)    Initial history of left-sided weakness and slurred speech. (+) cocaine. MRI of the brain, CT angiogram of the head and neck, and 2D echo are all unremarkable.     TMJ (dislocation of temporomandibular joint)    Troponin level elevated    Trop 60 60 47 with TIA and no CP: Lexiscan negative with EF 51        Past  Surgical History:   Past Surgical History:   Procedure Laterality Date    Colonoscopy N/A 03/26/2023    Procedure: COLONOSCOPY;  Surgeon: Heath Vu MD;  Location:  ENDOSCOPY    Colonoscopy N/A 12/30/2023    Procedure: COLONOSCOPY with cold snare polypectomy and forcep polypectomy;  Surgeon: Ousmane Suarez MD;  Location:  ENDOSCOPY    Colonoscopy & polypectomy  2019    Egd  2019    Duodenitis. Biopsied. EUS for weight loss was negative    Heart surgery      Hernia surgery  08/17/2022    Dr Barnes    Laminectomy,>2 sgmt,lumbar  09/06/2018    L4-L5 Decomp Discectomy ROEM L4-L5    Mitralplasty w cp bypass  1994    [place]: Repair    Repair rotator cuff,chronic Left     torn and had a ruptured bicep    Valve repair  1994    mitral valve       Social History:  reports that he quit smoking about 2 years ago. His smoking use included cigarettes. He started smoking about 29 years ago. He has a 27 pack-year smoking history. He has never used smokeless tobacco. He reports that he does not drink alcohol and does not use drugs.No illegal drugs, , 0 children, not working, no cane or walker    Family History:   Family History   Problem Relation Age of Onset    Hypertension Father     Alcohol and Other Disorders Associated Father     Substance Abuse Father         cocaine    Dementia Father     Cancer Father         lung    Diabetes Mother     Cancer Mother         multiple myeloma    Hypertension Mother     Anxiety Maternal Aunt     Depression Maternal Aunt     Anxiety Maternal Aunt     Depression Maternal Aunt     Bipolar Disorder Maternal Aunt     Diabetes Maternal Grandmother     Hypertension Maternal Grandmother     Cancer Maternal Grandfather         stomach cancer    Diabetes Maternal Grandfather     Hypertension Maternal Grandfather     Alcohol and Other Disorders Associated Maternal Grandfather     Hypertension Paternal Grandmother     Hypertension Paternal Grandfather     Cancer Sister         uterine and  ovarian    Hypertension Sister     Cancer Maternal Uncle         lung    Cancer Paternal Aunt         throat   Mother living  Father passed  2 sisters living  1 brother living     Allergies:   Allergies   Allergen Reactions    Hydrochlorothiazide RASH and HIVES       Medications:    Current Facility-Administered Medications on File Prior to Encounter   Medication Dose Route Frequency Provider Last Rate Last Admin    [COMPLETED] sodium chloride 0.9 % IV bolus 1,000 mL  1,000 mL Intravenous Once Francia, Mary Lou, DO   Stopped at 24 2337    [COMPLETED] HYDROmorphone (Dilaudid) 1 MG/ML injection 1 mg  1 mg Intravenous Once Francia, Mary Lou, DO   1 mg at 24 2225    [COMPLETED] ondansetron (Zofran) 4 MG/2ML injection 4 mg  4 mg Intravenous Once Francia, Mary Lou, DO   4 mg at 24 2226    [COMPLETED] iopamidol 76% (ISOVUE-370) injection for power injector  100 mL Intravenous ONCE PRN Francia, Mary Lou, DO   100 mL at 24 2253    [COMPLETED] cloNIDine (Catapres) tab 0.1 mg  0.1 mg Oral Once Francia, Mary Lou, DO   0.1 mg at 24 2320    [COMPLETED] dicyclomine (Bentyl) cap 10 mg  10 mg Oral TID PRN Bakari Noguera MD   10 mg at 24 0944    [COMPLETED] ketorolac (Toradol) 15 MG/ML injection 15 mg  15 mg Intravenous Once Stacy Shane MD   15 mg at 24 1455    [COMPLETED] labetalol (Trandate) 5 mg/mL injection 20 mg  20 mg Intravenous Once Stacy Shane MD   20 mg at 24 1534    [COMPLETED] morphINE PF 4 MG/ML injection 4 mg  4 mg Intravenous Once Stacy Shane MD   4 mg at 24 1659    [COMPLETED] hydrALAzine (Apresoline) 20 mg/mL injection 20 mg  20 mg Intravenous Once Stacy Shane MD   20 mg at 24 1717    [COMPLETED] ondansetron (Zofran) 4 MG/2ML injection 4 mg  4 mg Intravenous Once Livia Marx MD   4 mg at 24 0749    [COMPLETED] pantoprazole (Protonix) 40 mg in sodium chloride 0.9% PF 10 mL IV push  40 mg Intravenous Once Livia Marx MD   40 mg at 24 0749    []  sodium chloride 0.9 % IV bolus 100 mL  100 mL Intravenous Q30 Min PRN Lenka Bond MD        And    [] albumin human (Albumin) 25% injection 25 g  25 g Intravenous PRN Dialysis Lenka Bond MD        [COMPLETED] iopamidol 76% (ISOVUE-370) injection for power injector  100 mL Intravenous ONCE PRN Ria Gutierrez MD   100 mL at 24 1659    [COMPLETED] hydrALAzine (Apresoline) 20 mg/mL injection 5 mg  5 mg Intravenous Once Gama aRy DO   5 mg at 24 0109    [COMPLETED] cloNIDine (Catapres) tab 0.1 mg  0.1 mg Oral Once Gama Ray DO   0.1 mg at 24 0209    [COMPLETED] HYDROmorphone (Dilaudid) 1 MG/ML injection 0.5 mg  0.5 mg Intravenous Once Gama Ray DO   0.5 mg at 24 0236    [COMPLETED] hydrALAzine (Apresoline) 20 mg/mL injection 10 mg  10 mg Intravenous Once Gama Ray DO   10 mg at 24 0316    [COMPLETED] HYDROmorphone (Dilaudid) 1 MG/ML injection 1 mg  1 mg Intravenous Once Gama Ray DO   1 mg at 24 0348    [COMPLETED] HYDROmorphone (Dilaudid) 1 MG/ML injection 1 mg  1 mg Intravenous Once Terry Lew MD   1 mg at 24 1320    [COMPLETED] labetalol (Trandate) 5 mg/mL injection 20 mg  20 mg Intravenous Once Terry Lew MD   20 mg at 24 1320    [COMPLETED] hydrALAzine (Apresoline) 20 mg/mL injection 20 mg  20 mg Intravenous Once Terry Lew MD   20 mg at 24 1417    [COMPLETED] labetalol (Trandate) 5 mg/mL injection 40 mg  40 mg Intravenous Once Terry Lew MD   40 mg at 24 1449    [COMPLETED] heparin (Porcine) 1000 UNIT/ML injection 1,500 Units  1.5 mL Intracatheter Once Samaria Nava MD   1,500 Units at 24 2100     Current Outpatient Medications on File Prior to Encounter   Medication Sig Dispense Refill    docusate sodium 100 MG Oral Cap Take 1 capsule (100 mg total) by mouth 2 (two) times daily as needed for constipation. 60 capsule 0    dicyclomine 10 MG Oral Cap Take 1 capsule (10 mg total) by mouth 3  (three) times daily as needed. 20 capsule 0    isosorbide mononitrate ER 30 MG Oral Tablet 24 Hr Take 1 tablet (30 mg total) by mouth daily. Hold if systolic blood pressure <130      Lisdexamfetamine Dimesylate 60 MG Oral Cap Take 1 capsule (60 mg total) by mouth every morning.      buPROPion  MG Oral Tablet 24 Hr Take 1 tablet (150 mg total) by mouth daily.      ARIPiprazole 15 MG Oral Tab Take 1 tablet (15 mg total) by mouth daily.      bisacodyl 5 MG Oral Tab EC Take 3 tablets (15 mg total) by mouth daily. 30 tablet 0    polyethylene glycol, PEG 3350, 17 g Oral Powd Pack Take 17 g by mouth in the morning and 17 g before bedtime. 60 packet 0    lamoTRIgine (LAMICTAL) 150 MG Oral Tab Take 1 tablet (150 mg total) by mouth daily. 7 tablet 0    FLUoxetine HCl 40 MG Oral Cap Take 1 capsule (40 mg total) by mouth daily. 7 capsule 0    RENVELA 800 MG Oral Tab Take 1 tablet (800 mg total) by mouth 3 (three) times daily with meals.      losartan 100 MG Oral Tab Take 1 tablet (100 mg total) by mouth daily. Hold if systolic blood pressure <130      hydrALAZINE 50 MG Oral Tab Take 1 tablet (50 mg total) by mouth in the morning and 1 tablet (50 mg total) before bedtime. Hold if systolic blood pressure <130. 30 tablet 0    NIFEdipine ER 60 MG Oral Tablet 24 Hr Take 1 tablet (60 mg total) by mouth in the morning and 1 tablet (60 mg total) before bedtime. Hold if systolic blood pressure <130.      cloNIDine 0.1 MG Oral Tab Take 1 tablet (0.1 mg total) by mouth 2 (two) times daily. (Patient taking differently: Take 1 tablet (0.1 mg total) by mouth daily.) 60 tablet 0    carvedilol 25 MG Oral Tab Take 1 tablet (25 mg total) by mouth in the morning and 1 tablet (25 mg total) in the evening. Take with meals. 60 tablet 6    Tiotropium Bromide Monohydrate (SPIRIVA HANDIHALER) 18 MCG Inhalation Cap 1 capsule (18 mcg total) daily.      albuterol 108 (90 Base) MCG/ACT Inhalation Aero Soln albuterol sulfate HFA 90 mcg/actuation  aerosol inhaler   INHALE 1 TO 2 PUFFS BY MOUTH EVERY 4-6 HOURS AS NEEDED FOR COUGH OR WHEEZING      Fenofibrate 134 MG Oral Cap Take 1 capsule by mouth nightly. 90 capsule 1    Fluticasone Propionate 50 MCG/ACT Nasal Suspension SPRAY ONCE INTO EACH NOSTRIL BID PRN 15.8 mL 0       Review of Systems:   A comprehensive 14 point review of systems was completed.    Pertinent positives and negatives noted in the HPI.    Physical Exam:    BP (!) 168/115 (BP Location: Left arm)   Pulse 96   Temp 97.7 °F (36.5 °C) (Oral)   Resp (!) 28   Ht 6' 2\" (1.88 m)   Wt 238 lb (108 kg)   SpO2 100%   BMI 30.56 kg/m²   General: No acute distress. Alert and oriented   HEENT: Normocephalic atraumatic. Moist mucous membranes. EOM-I.   Neck: No JVD. No carotid bruits.  Respiratory: Clear to auscultation bilaterally. No wheezes. No crackles  Cardiovascular: S1, S2. Regular rate and rhythm. No murmurs  Chest and Back: No tenderness or deformity.  Abdomen: Soft, chronic tenderness of the left lower abdomen, nondistended.  Positive bowel sounds. No rebound, guarding  Neurologic: No focal neurological deficits. CNII-XII grossly intact. Sensation and strength intact  Musculoskeletal: Moves all extremities.  Extremities: No edema or tenderness of the LE  Integument: No new rashes or lesions.   Psychiatric: Appropriate mood and affect.      Diagnostic Data:      Labs:  Recent Labs   Lab 05/05/24  0424   WBC 8.8   HGB 10.8*   MCV 91.1   .0       Recent Labs   Lab 05/05/24  0425   *   BUN 64*   CREATSERUM 9.29*   CA 8.5   ALB 4.0      K 4.3      CO2 26.0   ALKPHO 110   AST 32   ALT 21   BILT 0.5   TP 8.0       Estimated Creatinine Clearance: 11.6 mL/min (A) (based on SCr of 9.29 mg/dL (H)).    No results for input(s): \"PTP\", \"INR\" in the last 168 hours.    No results for input(s): \"TROP\", \"CK\" in the last 168 hours.    Imaging: Imaging data reviewed in Epic.      ASSESSMENT / PLAN:   43 year old male with history of  asthma, ADD, bipolar 1, end-stage renal disease on dialysis, COPD, CHF, coronary disease, history of DVT and PE, depression, type 2 diabetes, hypertension, hyperlipidemia, migraines, neuropathy who presented after missing HD with severe BP elevation and continuation of abd pain.       Esrd on hd, missed hd  - HD today, discussed with Dr. Page    Uncontrolled htn  - resume home meds, getting hd today as well. Prn hydralazine and labetalol iv    Chronic abd pain, gi bleeding, recurrent  - has had extensive w/u with imaging and endoscopies.   - he today also has complaint of early satiety and feeling like food getting stuck- per review of previous notes, he had an EGD at [facility] with ?schatzki's ring  - he is to see gen surg for rectal bleeding/internal hemorrhoids. May be also helpful to discuss with them if there could be scar tissue contributing from previous PD placement. C-scope 3/23 mentioned sig left sided extrinsic fixation, diverticula, polyps. The latter was also noted during subsequent c-scope 12/23.     ADD  Bipolar  Depression  - cont home regimen    Asthma  COPD  -continue inhalers        Quality:  DVT Prophylaxis: heparin sq  CODE status: Full per chart  Abbott: none    Plan of care discussed with patient and staff    Dispo: no discharge    Laila Ryder MD  [facility] \[facility] [place] [facility]\""  [phone]

## 2024-05-06 LAB
ALBUMIN SERPL-MCNC: 3.8 G/DL (ref 3.4–5)
ANION GAP SERPL CALC-SCNC: 8 MMOL/L (ref 0–18)
BUN BLD-MCNC: 39 MG/DL (ref 9–23)
CALCIUM BLD-MCNC: 8.9 MG/DL (ref 8.5–10.1)
CHLORIDE SERPL-SCNC: 106 MMOL/L (ref 98–112)
CO2 SERPL-SCNC: 23 MMOL/L (ref 21–32)
CREAT BLD-MCNC: 6.82 MG/DL
EGFRCR SERPLBLD CKD-EPI 2021: 9 ML/MIN/1.73M2 (ref 60–?)
GLUCOSE BLD-MCNC: 115 MG/DL (ref 70–99)
GLUCOSE BLD-MCNC: 138 MG/DL (ref 70–99)
GLUCOSE BLD-MCNC: 147 MG/DL (ref 70–99)
GLUCOSE BLD-MCNC: 172 MG/DL (ref 70–99)
GLUCOSE BLD-MCNC: 178 MG/DL (ref 70–99)
MAGNESIUM SERPL-MCNC: 2.6 MG/DL (ref 1.6–2.6)
OSMOLALITY SERPL CALC.SUM OF ELEC: 298 MOSM/KG (ref 275–295)
PHOSPHATE SERPL-MCNC: 4.9 MG/DL (ref 2.5–4.9)
POTASSIUM SERPL-SCNC: 4.4 MMOL/L (ref 3.5–5.1)
SODIUM SERPL-SCNC: 137 MMOL/L (ref 136–145)

## 2024-05-06 PROCEDURE — 80069 RENAL FUNCTION PANEL: CPT | Performed by: INTERNAL MEDICINE

## 2024-05-06 PROCEDURE — 94640 AIRWAY INHALATION TREATMENT: CPT

## 2024-05-06 PROCEDURE — 82962 GLUCOSE BLOOD TEST: CPT

## 2024-05-06 PROCEDURE — 83735 ASSAY OF MAGNESIUM: CPT | Performed by: INTERNAL MEDICINE

## 2024-05-06 RX ORDER — HYOSCYAMINE SULFATE 0.125 MG
0.12 TABLET,DISINTEGRATING ORAL 3 TIMES DAILY
Status: DISCONTINUED | OUTPATIENT
Start: 2024-05-06 | End: 2024-05-09

## 2024-05-06 RX ORDER — DICYCLOMINE HYDROCHLORIDE 10 MG/1
10 CAPSULE ORAL 4 TIMES DAILY
Status: DISCONTINUED | OUTPATIENT
Start: 2024-05-06 | End: 2024-05-09

## 2024-05-06 RX ORDER — HYDROCODONE BITARTRATE AND ACETAMINOPHEN 5; 325 MG/1; MG/1
1 TABLET ORAL EVERY 6 HOURS PRN
Status: DISCONTINUED | OUTPATIENT
Start: 2024-05-06 | End: 2024-05-08

## 2024-05-06 RX ORDER — CYCLOBENZAPRINE HCL 5 MG
5 TABLET ORAL 3 TIMES DAILY
Status: DISCONTINUED | OUTPATIENT
Start: 2024-05-06 | End: 2024-05-09

## 2024-05-06 RX ORDER — ARIPIPRAZOLE 5 MG/1
5 TABLET ORAL DAILY
Status: DISCONTINUED | OUTPATIENT
Start: 2024-05-06 | End: 2024-05-06

## 2024-05-06 NOTE — PROGRESS NOTES
Pt is A/Ox4, on RA, w/  and tele monitoring.  VSS, tolerating regular diet well, up ad osvaldo, saline locked.  QID accucheks.  Pain being managed with prn IV dilauded, starting to advance to PO norco.  Hemodialysis scheduled for tomorrow.  Pt updated on plan of care, call light and belongings within reach, all questions and concerns addressed at this time.

## 2024-05-06 NOTE — PAYOR COMM NOTE
--------------  ADMISSION REVIEW     Payor: [employer]  Subscriber #:  [id number]  Authorization Number: [id number]    Admit date: 5/5/24  Admit time:  9:26 AM       Patient Seen in: [facility] Emergency Department    History     Stated Complaint: Abdominal pain and rectal bleeding. States that it has been ongoing. +N/V    44-year-old male with history of end-stage renal disease presents for evaluation of recurrent left lower quadrant abdominal pain flaring over the past 2 days.  Patient has a history of same recurrently in the past related to diverticular disease.  He has been admitted several times in recent months for the same.  However usually he is told that the diverticular disease does not require antibiotics because it is not inflamed and that he may be having some minor GI bleeding related to internal hemorrhoids.  Patient continues to have some streaks of blood in his stool since previous admission, but not worse.  Patient missed his dialysis yesterday at [facility] due to his pain    Objective:   Past Medical History:    Asthma (Formerly [facility])    Attention deficit hyperactivity disorder (ADHD)    Back problem    Bipolar 1 disorder (Formerly [facility])    Cancer (Formerly [facility])    CKD (chronic kidney disease) stage 3, GFR 30-59 ml/min (Formerly [facility])    Dr Meeks    Congestive heart disease (Formerly [facility])    COPD (chronic obstructive pulmonary disease) (Formerly [facility])    Coronary atherosclerosis    Deep vein thrombosis (Formerly [facility])    at age 17 R/T cast    Depression    Diabetes (Formerly [facility])    Essential hypertension    3/21 echo: severe concentric LVH with normal EF and no MR or pHTN    Extrinsic asthma, unspecified    Heart attack (Formerly [facility])    2016- angiogram- no intervention    Heart valve disease    mitral valve repair in 1994/    High blood pressure    High cholesterol    History of mitral valve repair    Hyperlipidemia    Low back pain    tight and stiff after sweeping and mopping    LVH (left ventricular hypertrophy)    Migraines    Mixed  hyperlipidemia     HDL 38 LDL 97 VLDL 57     Monoclonal gammopathy    IgG kappa     MVP (mitral valve prolapse)    Repair 1994 at [place]; echoes as recently as 3/21 show mild or trivial MR and no stenosis    Neuropathy    Osteoarthritis    hip ,knees    Pneumonia due to organism    Pulmonary embolism (HCC)    Renal disorder    TIA (transient ischemic attack)    Initial history of left-sided weakness and slurred speech. (+) cocaine. MRI of the brain, CT angiogram of the head and neck, and 2D echo are all unremarkable.     TMJ (dislocation of temporomandibular joint)    Troponin level elevated    Trop 60 60 47 with TIA and no CP: Lexiscan negative with EF 51     Past Surgical History:   Procedure Laterality Date    Colonoscopy N/A 03/26/2023    Procedure: COLONOSCOPY;  Surgeon: Heath Vu MD;  Location:  ENDOSCOPY    Colonoscopy N/A 12/30/2023    Procedure: COLONOSCOPY with cold snare polypectomy and forcep polypectomy;  Surgeon: Ousmane Suarez MD;  Location:  ENDOSCOPY    Colonoscopy & polypectomy  2019    Egd  2019    Duodenitis. Biopsied. EUS for weight loss was negative    Heart surgery      Hernia surgery  08/17/2022    Dr Barnes    Laminectomy,>2 sgmt,lumbar  09/06/2018    L4-L5 Decomp Discectomy ROEM L4-L5    Mitralplasty w cp bypass  1994    [facility]: Repair    Repair rotator cuff,chronic Left     torn and had a ruptured bicep    Valve repair  1994    mitral valve     Physical Exam     ED Triage Vitals [05/05/24 0352]   BP (S) (!) 196/120   Pulse 99   Resp 20   Temp 98.1 °F (36.7 °C)   Temp src Temporal   SpO2 99 %   O2 Device None (Room air)     Current:BP (S) (!) 195/120   Pulse 86   Temp 98.1 °F (36.7 °C) (Temporal)   Resp 19   Ht 188 cm (6' 2\")   Wt 108 kg   SpO2 99%   BMI 30.56 kg/m²     Physical Exam    General: Alert, oriented, no apparent distress  HEENT: Atraumatic, normocephalic.  Pupils equal reactive.  Extraocular motions intact.  Neck: Supple  Lungs: Clear to  auscultation bilaterally.  Heart: Regular rate and rhythm.  Abdomen: Soft, nontender.   Skin: No rash.  No edema.  Neurologic: No focal neurologic deficits.  Normal speech pattern  Musculoskeletal: No tenderness or deformity noted.  Full range of motion throughout.    Labs Reviewed   COMP METABOLIC PANEL (14) - Abnormal; Notable for the following components:       Result Value    Glucose 153 (*)     BUN 64 (*)     Creatinine 9.29 (*)     Calculated Osmolality 313 (*)     eGFR-Cr 6 (*)     All other components within normal limits   LIPASE - Abnormal; Notable for the following components:    Lipase 137 (*)     All other components within normal limits   CBC W/ DIFFERENTIAL - Abnormal; Notable for the following components:    RBC 3.37 (*)     HGB 10.8 (*)     HCT 30.7 (*)     All other components within normal limits   URINALYSIS WITH CULTURE REFLEX     MDM      Differential diagnosis includes diverticulitis, colitis, small bowel obstruction    Chemistry with normal electrolytes and chronic kidney disease.    CBC with hemoglobin of 10.8 which is baseline for the patient        Medications   HYDROmorphone (Dilaudid) 1 MG/ML injection 0.5 mg (0.5 mg Intravenous Given 5/5/24 0642)   hydrALAzine (Apresoline) 20 mg/mL injection 20 mg (has no administration in time range)   ondansetron (Zofran) 4 MG/2ML injection 4 mg (4 mg Intravenous Given 5/5/24 0517)   labetalol (Trandate) 5 mg/mL injection 20 mg (20 mg Intravenous Given 5/5/24 0519)     CT scan shows no appendicitis, diverticulitis, bowel obstruction or AAA.    Case management and then primary nephrologist in [place] called to get the patient an appointment with [facility] later today or tomorrow as he missed his appointment yesterday       No ability to get the patient an appointment prior to Tuesday.  Spoke with Dr. Page who will admit here for dialysis      Disposition and Plan     Clinical Impression:  1. Abdominal pain, left lower quadrant    2. ESRD (end stage  renal disease) (HCC)    3. Resistant hypertension            [facility]   part of [facility]     History and Physical            Godwin Fonseca Patient Status:  Inpatient    1980 MRN [id number]   Location [facility] 7NE-A Attending Jhonny Rebolledo MD   Hosp Day # 0 PCP Prieto Novak MD      Chief Complaint: missed HD     History of Present Illness: Godwin Fonseca is a 44 year old male with history of asthma, ADD, bipolar 1, end-stage renal disease on dialysis, COPD, CHF, coronary disease, history of DVT and PE, depression, type 2 diabetes, hypertension, hyperlipidemia, migraines, neuropathy who presented with missed dialysis.  Normally is dialyzed on  and missed yesterday session.  Emergency room was not able to coordinate outpatient dialysis otherwise over the weekend, therefore patient is getting admitted.  His blood pressure also noted to be significantly elevated.  He did not take his blood pressure medicine this morning although did take it last night.     Patient has had a history of persistent recurrent abdominal pain that he reports has occurred ever since his peritoneal dialysis catheter was placed a year and a half ago and then removed.  He reports that he has intermittent bloody bowel movements where the toilet water is red and that when he wipes he gets dark clots.  Does not seem to be mixed in with the stool.  He also reports having early satiety and some difficulty swallowing, does note that he was told in the past he had a Schatzki's ring.  Patient typically takes tramadol for pain at home but feels that this does nothing.  He reports also taking some medications for abdominal spasm but does not feel like these help.  He has had multiple evaluations for this abdominal pain including imaging studies and endoscopies but these have been nonrevealing.     During his last hospitalization he was noted to have bloody bowel movement prior to admission and was  seen by a GI and was instructed to follow-up with surgery for hemorrhoid management.  He does have a appointment with general surgery this upcoming week.  He had a colonoscopy last year and had most recently of flex sigmoidoscopy March 25 of this year showing hemorrhoids and no bleeding through to the sigmoid.  GI notes do indicate a component of chronic constipation that improves with stool softeners and MiraLAX      Physical Exam:    BP (!) 168/115 (BP Location: Left arm)   Pulse 96   Temp 97.7 °F (36.5 °C) (Oral)   Resp (!) 28   Ht 6' 2\" (1.88 m)   Wt 238 lb (108 kg)   SpO2 100%   BMI 30.56 kg/m²   General: No acute distress. Alert and oriented   HEENT: Normocephalic atraumatic. Moist mucous membranes. EOM-I.   Neck: No JVD. No carotid bruits.  Respiratory: Clear to auscultation bilaterally. No wheezes. No crackles  Cardiovascular: S1, S2. Regular rate and rhythm. No murmurs  Chest and Back: No tenderness or deformity.  Abdomen: Soft, chronic tenderness of the left lower abdomen, nondistended.  Positive bowel sounds. No rebound, guarding  Neurologic: No focal neurological deficits. CNII-XII grossly intact. Sensation and strength intact  Musculoskeletal: Moves all extremities.  Extremities: No edema or tenderness of the LE  Integument: No new rashes or lesions.   Psychiatric: Appropriate mood and affect.       ASSESSMENT / PLAN:   43 year old male with history of asthma, ADD, bipolar 1, end-stage renal disease on dialysis, COPD, CHF, coronary disease, history of DVT and PE, depression, type 2 diabetes, hypertension, hyperlipidemia, migraines, neuropathy who presented after missing HD with severe BP elevation and continuation of abd pain.         Esrd on hd, missed hd  - HD today, discussed with Dr. Page     Uncontrolled htn  - resume home meds, getting hd today as well. Prn hydralazine and labetalol iv     Chronic abd pain, gi bleeding, recurrent  - has had extensive w/u with imaging and endoscopies.   - he  today also has complaint of early satiety and feeling like food getting stuck- per review of previous notes, he had an EGD at [facility] with ?michel's ring  - he is to see gen surg for rectal bleeding/internal hemorrhoids. May be also helpful to discuss with them if there could be scar tissue contributing from previous PD placement. C-scope 3/23 mentioned sig left sided extrinsic fixation, diverticula, polyps. The latter was also noted during subsequent c-scope 12/23.      ADD  Bipolar  Depression  - cont home regimen     Asthma  COPD  -continue inhalers        RENAL:    Patient is a 44 year old male with PMH of Asthma, ADD, CHF COPD, CAD, DVT, PE, DM2, HTN, HL, ESRD on HD TTS here with abdominal pain were consulted to resume iHD while inhouse     Patient reports having abdominal pain since he haed his PD catheter placed a year and a half ago and has been having intermittent abdominal pain ever since on and off, over the past couple of days this had worsened the reason he came to the hospital.    Blood pressure (!) 172/112, pulse 98, temperature 98.1 °F (36.7 °C), temperature source Temporal, resp. rate (!) 28, height 6' 2\" (1.88 m), weight 238 lb (108 kg), SpO2 99%.       5/6:    HOSPITALIST:    SUBJECTIVE:  Reports that he had episode last night of intense cramping in left lower quadrant where he noted that his abd muscles had spasm on that side  He states that other times he has pain after he eats.      OBJECTIVE:  Temp:  [97.6 °F (36.4 °C)-98.4 °F (36.9 °C)] 98 °F (36.7 °C)  Pulse:  [] 90  Resp:  [18-20] 20  BP: ()/() 99/58  SpO2:  [95 %-100 %] 100 %    Gen: No acute distress  HEENT: anicteric sclera, MMM  Pulm: Lungs clear, normal respiratory effort  CV: Heart with regular rate and rhythm, no peripheral edema  Abd: Abdomen soft, nontender, nondistended, no organomegaly, bowel sounds present  MSK: Full range of motion in extremities, no clubbing, no cyanosis  Skin: no rashes or  lesions  Neuro: A&OX3, no focal deficits    Lab 05/05/24  0425 05/06/24  0542    137   K 4.3 4.4    106   CO2 26.0 23.0   BUN 64* 39*   CREATSERUM 9.29* 6.82*   CA 8.5 8.9   MG  --  2.6   PHOS  --  4.9   * 178*      ALT 21  --    AST 32  --    ALB 4.0 3.8        Assessment/Plan:   43 year old male with history of asthma, ADD, bipolar 1, end-stage renal disease on dialysis, COPD, CHF, coronary disease, history of DVT and PE, depression, type 2 diabetes, hypertension, hyperlipidemia, migraines, neuropathy who presented after missing HD with severe BP elevation and continuation of abd pain.         Esrd on hd, missed hd  - underwent HD 5/5, nephro following     Uncontrolled htn  - resume home meds, better after HD yesterday     Chronic abd pain, gi bleeding, recurrent  - has had extensive w/u with imaging and endoscopies.   - he today also has complaint of early satiety and feeling like food getting stuck- per review of previous notes, he had an EGD at [facility] with ?schatzki's ring  - he is to see gen surg for rectal bleeding/internal hemorrhoids. May be also helpful to discuss with them if there could be scar tissue contributing from previous PD placement. C-scope 3/23 mentioned sig left sided extrinsic fixation, diverticula, polyps. The latter was also noted during subsequent c-scope 12/23.   - he has been on meds for bowel spasm including levsin and bentyl  - today he reports that he had almost a muscle spasm like sensation in LLQ. As we are trying to transition off of IV dilaudid, will give norco and try flexeril. If no further recs forthcoming from gen surg, he should f/u with his chronic pain specialist (he sees for cervical spine issues)     ADD  Bipolar  Depression  - cont home regimen     Asthma  COPD  -continue inhalers        RENAL:    Diarrhea, vomiting yesterday  No other complaints      ASSESSMENT & PLAN   44 year old male with PMH of Asthma, ADD, CHF COPD, CAD, DVT, PE, DM2, HTN, HL,  ESRD on HD TTS here with abdominal pain were consulted to resume iHD while inhouse:     ESRD on HD:  -- TTS schedule last HD on Thursday unable to go to HD because of abdominal pain.  -- sp HD Sunday. UF 3L per pt. Notes usual UF 4-5L/ However with vomiting and diarrhea will plan for less UF again tomorrow  -- plan for HD tomorrow, 3K, UF as tolerated. HD directly managed   -- avoid nephrotoxins and renally adjust medications     Anemia in ESRD:  -- epo if hgb < 10      Hyperphosphatemia/CKD-MBD:  -- resume renvela 800mg PO TID with meals.  -- check phos daily.     HTN:  -- improved sp HD. Continue home meds      Abdominal pain:  -- per primary      NURSING:    Pain being managed with prn IV dilauded, starting to advance to PO norco. Hemodialysis scheduled for tomorrow.     MEDICATIONS ADMINISTERED IN LAST 1 DAY:      heparin (Porcine) 1000 UNIT/ML injection 5,000 Units       Date Action Dose Route User    5/5/2024 1827 Given 5,000 Units Intravenous Sonja Parr RN          heparin (Porcine) 5000 UNIT/ML injection 5,000 Units       Date Action Dose Route User    5/6/2024 1342 Given 5,000 Units Subcutaneous (Left Lower Abdomen) Ethan Whitehead RN    5/6/2024 0659 Given 5,000 Units Subcutaneous (Right Lower Abdomen) Kt Rock RN    5/5/2024 2206 Given 5,000 Units Subcutaneous (Left Lower Abdomen) Kt Rock RN    5/5/2024 1527 Given 5,000 Units Subcutaneous (Right Lower Abdomen) Sonja Parr RN          hydrALAzine (Apresoline) 20 mg/mL injection 10 mg       Date Action Dose Route User    5/5/2024 1852 Given 10 mg Intravenous Sonja Parr RN          hydrALAZINE (Apresoline) tab 50 mg       Date Action Dose Route User    5/6/2024 0829 Given 50 mg Oral Ethan Whitehead RN    5/5/2024 2157 Given 50 mg Oral Kt Rock RN          HYDROcodone-acetaminophen (Norco) 5-325 MG per tab 1 tablet       Date Action Dose Route User    5/6/2024 1342 Given 1 tablet Oral Charley  JASON Long          HYDROmorphone (Dilaudid) 1 MG/ML injection 0.8 mg       Date Action Dose Route User    5/6/2024 1124 Given 0.8 mg Intravenous Ethan Whitehead RN    5/6/2024 0822 Given 0.8 mg Intravenous Ethan Whitehead RN    5/6/2024 0407 Given 0.8 mg Intravenous Kt Rock RN    5/5/2024 2203 Given 0.8 mg Intravenous Kt Rock RN    5/5/2024 1556 Given 0.8 mg Intravenous Sonja Parr RN          isosorbide mononitrate ER (Imdur) 24 hr tab 30 mg       Date Action Dose Route User    5/6/2024 0829 Given 30 mg Oral Ethan Whitehead RN          losartan (Cozaar) tab 100 mg       Date Action Dose Route User    5/6/2024 0825 Given 100 mg Oral Ethan Whitehead RN          NIFEdipine ER (Procardia-XL) 24 hr tab 60 mg       Date Action Dose Route User    5/6/2024 0827 Given 60 mg Oral Ethan Whitehead RN    5/5/2024 2157 Given 60 mg Oral Kt Rock RN       1 Day 3 Days 7 Days 10 Days < Today >   Legend:       Medications 05/05/24 05/06/24 05/07/24         User Information   Initial Name Initial Name   AG Jazz Horan RN  [id number]] BS Geremias Keen RN  [[id number]]   CC Sonja Parr RN  [id number]] DG Ethan Whitehead RN  [[id number]]   EM Mark Ray, RCP  [id number]] JM Annie Figueroa RN  [id number]]   KS Karey Dykes, RCP  [id number]] LJ Heike Herrera RN  [id number]]   MG Kt Rock RN  [[id number]] Mayte Barrios  [[id number]]               PRN Meds Sorted by Name  for Godwin Fonseca as of 5/5/24 through 5/7/24    1 Day 3 Days 7 Days 10 Days < Today >   Legend:       Medications 05/05/24 05/06/24 05/07/24    HYDROmorphone (Dilaudid) 1 MG/ML injection 0.4 mg  Dose: 0.4 mg  Freq: Every 2 hour PRN Route: IV  PRN Reason: mild pain  Start: 05/05/24 1021   Admin Instructions:   Use PRN reason as a guide and follow range order policy. If oral pain meds are ordered and patient can tolerate oral intake, start with PRN oral pain medications first.                           1850  DG-Given        0236 AG-Given [C]   0509 AG-Given [C]   0931 DG-Given               Or   HYDROmorphone (Dilaudid) 1 MG/ML injection 0.8 mg  Dose: 0.8 mg  Freq: Every 2 hour PRN Route: IV  PRN Reason: moderate pain  Start: 05/05/24 1021   Admin Instructions:   Use PRN reason as a guide and follow range order policy. If oral pain meds are ordered and patient can tolerate oral intake, start with PRN oral pain medications first.    1055 CC-Given   1556 CC-Given   2203 MG-Given      0407 MG-Given   0822 DG-Given   1124 DG-Given                        1140 DG-Given          HYDROmorphone (Dilaudid) 1 MG/ML injection 0.5 mg  Dose: 0.5 mg  Freq: Every 30 min PRN Route: IV  PRN Reason: severe pain  Start: 05/05/24 0512 End: 05/05/24 1025    0518 JM-Given   0642 JM-Given   0820 BS-Given     1025-D/C'd          labetalol (Trandate) 5 mg/mL injection 20 mg  Dose: 20 mg  Freq: Every 4 hours PRN Route: IV  PRN Reason: Increased blood pressure  Start: 05/05/24 1022    1056 CC-Given            ondansetron (Zofran) 4 MG/2ML injection 4 mg  Dose: 4 mg  Freq: Every 6 hours PRN Route: IV  PRN Reasons: Nausea,vomiting  Start: 05/05/24 1025   Admin Instructions:   Default antiemetic sequence (unless otherwise preferred by patient):  1. ondansetron (Zofran)  2. prochlorperazine (Compazine). Wait 15 minutes before proceeding to next medication in sequence.  Follow therapeutic duplication policy.    1109 CC-Given            Legend:              Vitals (last day)      05/06/24 2049 -- -- -- 93/52 -- -- -- -- AG   05/06/24 1847 -- -- -- 92/46 -- -- -- -- JS   05/06/24 1705 -- -- -- 92/63 -- -- -- -- DG   05/06/24 1704 -- -- -- 81/51 Abnormal  -- -- -- -- DG   05/06/24 1615 -- -- -- 84/47 Abnormal  -- -- -- --    05/06/24 1556 -- -- -- 76/43 Abnormal  -- -- -- --    05/06/24 1555 97.3 °F (36.3 °C) 85 20 68/53 Abnormal  98 % -- None (Room air) --       Date/Time Temp Pulse Resp BP SpO2 Weight O2 Device O2 Flow Rate (L/min) [facility]    05/06/24  1134 98 °F (36.7 °C) 90 20 99/58 -- -- None (Room air) --     05/06/24 0846 97.8 °F (36.6 °C) 105 20 138/84 -- -- None (Room air) --     05/06/24 0520 98.4 °F (36.9 °C) 103 18 157/93 100 % -- None (Room air) -- [place]    05/06/24 0040 97.6 °F (36.4 °C) 97 18 166/98 95 % -- None (Room air) -- [place]    05/05/24 2040 98.1 °F (36.7 °C) 93 18 160/75 100 % -- None (Room air) -- [place]    05/05/24 1753 98 °F (36.7 °C) 89 -- 188/102 99 % -- None (Room air) 0 L/min     05/05/24 1327 -- -- -- -- -- 238 lb -- --     05/05/24 1159 97.7 °F (36.5 °C) 96 -- 168/115 100 % -- None (Room air) 0 L/min     05/05/24 1012 98.1 °F (36.7 °C) 93 -- 183/120 100 % -- None (Room air) 0 L/min     05/05/24 0900 -- 98 28 172/112 99 % -- None (Room air) --     05/05/24 0830 -- 95 25 176/104 98 % -- None (Room air) --     05/05/24 0800 -- 86 19 195/120  99 % -- -- --     05/05/24 0522 -- -- -- -- -- -- None (Room air) -- [facility]    05/05/24 0354 -- -- -- 178/124  -- -- -- --     05/05/24 0352 98.1 °F (36.7 °C) 99 20 196/120  99 % 238 lb None (Room air) --

## 2024-05-06 NOTE — PROGRESS NOTES
Pt is alert and oriented, on room air w/o LIZBETH or SOB. BP is better after HD and scheduled BP meds. HD catheter at right subclavian, 2L clamped, dressing CDI.   Pt reports RLQ severe abdominal pain and some itchiness, prn meds offered as per mar. Up ad osvaldo, poc discussed, call light in reach

## 2024-05-06 NOTE — PROGRESS NOTES
.[facility] note    PCP: Prieto Novak MD    Chief Complaint:  Abd pain, htn    SUBJECTIVE:  Reports that he had episode last night of intense cramping in left lower quadrant where he noted that his abd muscles had spasm on that side  He states that other times he has pain after he eats.     OBJECTIVE:  Temp:  [97.6 °F (36.4 °C)-98.4 °F (36.9 °C)] 98 °F (36.7 °C)  Pulse:  [] 90  Resp:  [18-20] 20  BP: ()/() 99/58  SpO2:  [95 %-100 %] 100 %    Intake/Output:    Intake/Output Summary (Last 24 hours) at 5/6/2024 1446  Last data filed at 5/6/2024 1414  Gross per 24 hour   Intake 1200 ml   Output 0 ml   Net 1200 ml       Last 3 Weights   05/05/24 1327 238 lb (108 kg)   05/05/24 0352 238 lb (108 kg)   04/22/24 2201 269 lb (122 kg)   04/02/24 1952 240 lb (108.9 kg)   04/02/24 1351 240 lb (108.9 kg)       Exam  Gen: No acute distress  HEENT: anicteric sclera, MMM  Pulm: Lungs clear, normal respiratory effort  CV: Heart with regular rate and rhythm, no peripheral edema  Abd: Abdomen soft, nontender, nondistended, no organomegaly, bowel sounds present  MSK: Full range of motion in extremities, no clubbing, no cyanosis  Skin: no rashes or lesions  Neuro: A&OX3, no focal deficits    Data Review:         Labs:     Recent Labs   Lab 05/05/24 0424   WBC 8.8   HGB 10.8*   MCV 91.1   .0   NE 6.14   LYMABS 1.47       Recent Labs   Lab 05/05/24 0425 05/06/24  0542    137   K 4.3 4.4    106   CO2 26.0 23.0   BUN 64* 39*   CREATSERUM 9.29* 6.82*   CA 8.5 8.9   MG  --  2.6   PHOS  --  4.9   * 178*       Recent Labs   Lab 05/05/24 0425 05/06/24  0542   ALT 21  --    AST 32  --    ALB 4.0 3.8       No results for input(s): \"TROP\", \"CK\", \"PBNP\", \"PCT\" in the last 168 hours.    No results for input(s): \"CRP\", \"HARJEET\", \"LDH\", \"DDIMER\" in the last 168 hours.    Recent Labs   Lab 05/05/24  1157 05/05/24  1724 05/05/24  2105 05/06/24  0524 05/06/24  1101   PGLU 129* 122* 150* 147* 138*        Meds:   Scheduled Medication:   cyclobenzaprine  5 mg Oral TID    ARIPiprazole  5 mg Oral Daily    heparin  5,000 Units Subcutaneous Q8H MARTHA    bisacodyl  15 mg Oral Daily    carvedilol  25 mg Oral BID with meals    cloNIDine  0.1 mg Oral Daily    FLUoxetine HCl  40 mg Oral Daily    fluticasone propionate  1 spray Each Nare BID    lamoTRIgine  150 mg Oral Daily    polyethylene glycol (PEG 3350)  17 g Oral BID    buPROPion ER  150 mg Oral Daily    hydrALAZINE  50 mg Oral BID    isosorbide mononitrate ER  30 mg Oral Daily    amphetamine-dextroamphetamine  10 mg Oral BID AC    losartan  100 mg Oral Daily    NIFEdipine ER  60 mg Oral BID    sevelamer carbonate  800 mg Oral TID CC    umeclidinium bromide  1 puff Inhalation Daily    insulin aspart  1-5 Units Subcutaneous TID AC and HS    ARIPiprazole  5 mg Oral Daily     Continuous Infusing Medication:  PRN Medication:  HYDROcodone-acetaminophen    HYDROmorphone **OR** HYDROmorphone **OR** HYDROmorphone    hydrALAzine    labetalol    acetaminophen    sennosides    bisacodyl    ondansetron    prochlorperazine    dicyclomine    docusate sodium    albuterol    glucose **OR** glucose **OR** glucose-vitamin C **OR** dextrose **OR** glucose **OR** glucose **OR** glucose-vitamin C    heparin    diphenhydrAMINE **OR** diphenhydrAMINE       Microbiology:    No results found for this visit on 05/05/24.    Lab Results   Component Value Date    COVID19 Not Detected 03/16/2024    COVID19 Not Detected 03/09/2024    COVID19 Not Detected 03/08/2024        Assessment/Plan:   43 year old male with history of asthma, ADD, bipolar 1, end-stage renal disease on dialysis, COPD, CHF, coronary disease, history of DVT and PE, depression, type 2 diabetes, hypertension, hyperlipidemia, migraines, neuropathy who presented after missing HD with severe BP elevation and continuation of abd pain.         Esrd on hd, missed hd  - underwent HD 5/5, nephro following     Uncontrolled htn  - resume  home meds, better after HD yesterday     Chronic abd pain, gi bleeding, recurrent  - has had extensive w/u with imaging and endoscopies.   - he today also has complaint of early satiety and feeling like food getting stuck- per review of previous notes, he had an EGD at [facility] with ?schatzki's ring  - he is to see gen surg for rectal bleeding/internal hemorrhoids. May be also helpful to discuss with them if there could be scar tissue contributing from previous PD placement. C-scope 3/23 mentioned sig left sided extrinsic fixation, diverticula, polyps. The latter was also noted during subsequent c-scope 12/23.   - he has been on meds for bowel spasm including levsin and bentyl  - today he reports that he had almost a muscle spasm like sensation in LLQ. As we are trying to transition off of IV dilaudid, will give norco and try flexeril. If no further recs forthcoming from gen surg, he should f/u with his chronic pain specialist (he sees for cervical spine issues)     ADD  Bipolar  Depression  - cont home regimen     Asthma  COPD  -continue inhalers           Quality:  DVT Prophylaxis: heparin sq  CODE status: Full per chart  Abbott: none          Laila Ryder MD  [facility]  Pager: [phone]

## 2024-05-06 NOTE — PROGRESS NOTES
[facility]   part of [facility]    Nephrology Progress Note    Godwin Fonseca Attending:  Laila Ryder MD     Cc: ESRD    SUBJECTIVE     Diarrhea, vomiting yesterday  No other complaints     PHYSICAL EXAM   Vital signs: BP 99/58 (BP Location: Right arm)   Pulse 90   Temp 98 °F (36.7 °C) (Oral)   Resp 20   Ht 6' 2\" (1.88 m)   Wt 238 lb (108 kg)   SpO2 100%   BMI 30.56 kg/m²   Temp (24hrs), Av °F (36.7 °C), Min:97.6 °F (36.4 °C), Max:98.4 °F (36.9 °C)       Intake/Output Summary (Last 24 hours) at 2024 1514  Last data filed at 2024 1414  Gross per 24 hour   Intake 1200 ml   Output 0 ml   Net 1200 ml     Wt Readings from Last 3 Encounters:   24 238 lb (108 kg)   24 269 lb (122 kg)   24 240 lb (108.9 kg)     General: NAD  HEENT: NCAT, EOMI, MMM  Neck: Supple   Cardiac: Regular rate and rhythm   Lungs: CTAB  Abdomen: Soft, non-tender, nondistended   Extremities: No edema  Neurologic: No asterxis  Skin: Warm and dry, no rashes     MEDS     Current Facility-Administered Medications   Medication Dose Route Frequency    cyclobenzaprine (Flexeril) tab 5 mg  5 mg Oral TID    HYDROcodone-acetaminophen (Norco) 5-325 MG per tab 1 tablet  1 tablet Oral Q6H PRN    ARIPiprazole (Abilify) tab 5 mg  5 mg Oral Daily    hyoscyamine sulfate (LEVSIN) disintegrating tab 0.125 mg  0.125 mg Sublingual TID    dicyclomine (Bentyl) cap 10 mg  10 mg Oral QID    HYDROmorphone (Dilaudid) 1 MG/ML injection 0.4 mg  0.4 mg Intravenous Q2H PRN    Or    HYDROmorphone (Dilaudid) 1 MG/ML injection 0.8 mg  0.8 mg Intravenous Q2H PRN    Or    HYDROmorphone (Dilaudid) 1 MG/ML injection 1.2 mg  1.2 mg Intravenous Q2H PRN    hydrALAzine (Apresoline) 20 mg/mL injection 10 mg  10 mg Intravenous Q4H PRN    labetalol (Trandate) 5 mg/mL injection 20 mg  20 mg Intravenous Q4H PRN    acetaminophen (Tylenol Extra Strength) tab 500 mg  500 mg Oral Q4H PRN    sennosides (Senokot) tab 17.2 mg  17.2 mg Oral Nightly PRN     bisacodyl (Dulcolax) 10 MG rectal suppository 10 mg  10 mg Rectal Daily PRN    ondansetron (Zofran) 4 MG/2ML injection 4 mg  4 mg Intravenous Q6H PRN    prochlorperazine (Compazine) 10 MG/2ML injection 5 mg  5 mg Intravenous Q8H PRN    heparin (Porcine) 5000 UNIT/ML injection 5,000 Units  5,000 Units Subcutaneous Q8H MARTHA    bisacodyl (Dulcolax) DR tab 15 mg  15 mg Oral Daily    carvedilol (Coreg) tab 25 mg  25 mg Oral BID with meals    cloNIDine (Catapres) tab 0.1 mg  0.1 mg Oral Daily    docusate sodium (Colace) cap 100 mg  100 mg Oral BID PRN    FLUoxetine (PROzac) cap 40 mg  40 mg Oral Daily    fluticasone propionate (Flonase) 50 MCG/ACT nasal suspension 1 spray  1 spray Each Nare BID    lamoTRIgine (LaMICtal) tab 150 mg  150 mg Oral Daily    polyethylene glycol (PEG 3350) (Miralax) 17 g oral packet 17 g  17 g Oral BID    albuterol (Ventolin HFA) 108 (90 Base) MCG/ACT inhaler 2 puff  2 puff Inhalation Q4H PRN    buPROPion ER (Wellbutrin XL) 24 hr tab 150 mg  150 mg Oral Daily    hydrALAZINE (Apresoline) tab 50 mg  50 mg Oral BID    isosorbide mononitrate ER (Imdur) 24 hr tab 30 mg  30 mg Oral Daily    amphetamine-dextroamphetamine (Adderall) tab 10 mg  10 mg Oral BID AC    losartan (Cozaar) tab 100 mg  100 mg Oral Daily    NIFEdipine ER (Procardia-XL) 24 hr tab 60 mg  60 mg Oral BID    sevelamer carbonate (Renvela) tab 800 mg  800 mg Oral TID CC    umeclidinium bromide (Incruse Ellipta) 62.5 MCG/ACT inhaler 1 puff  1 puff Inhalation Daily    glucose (Dex4) 15 GM/59ML oral liquid 15 g  15 g Oral Q15 Min PRN    Or    glucose (Glutose) 40% oral gel 15 g  15 g Oral Q15 Min PRN    Or    glucose-vitamin C (Dex-4) chewable tab 4 tablet  4 tablet Oral Q15 Min PRN    Or    dextrose 50% injection 50 mL  50 mL Intravenous Q15 Min PRN    Or    glucose (Dex4) 15 GM/59ML oral liquid 30 g  30 g Oral Q15 Min PRN    Or    glucose (Glutose) 40% oral gel 30 g  30 g Oral Q15 Min PRN    Or    glucose-vitamin C (Dex-4) chewable tab 8  tablet  8 tablet Oral Q15 Min PRN    insulin aspart (NovoLOG) 100 Units/mL FlexPen 1-5 Units  1-5 Units Subcutaneous TID AC and HS    heparin (Porcine) 1000 UNIT/ML injection 5,000 Units  5,000 Units Intravenous PRN Dialysis    diphenhydrAMINE (Benadryl) 50 mg/mL  injection 12.5 mg  12.5 mg Intravenous Q4H PRN    Or    diphenhydrAMINE (Benadryl) cap/tab 25 mg  25 mg Oral Q4H PRN    ARIPiprazole (Abilify) tab 5 mg  5 mg Oral Daily       LABS     Lab Results   Component Value Date    CREATSERUM 6.82 05/06/2024    BUN 39 05/06/2024     05/06/2024    K 4.4 05/06/2024     05/06/2024    CO2 23.0 05/06/2024     05/06/2024    CA 8.9 05/06/2024    ALB 3.8 05/06/2024    MG 2.6 05/06/2024    PHOS 4.9 05/06/2024    PGLU 138 05/06/2024       IMAGING   All imaging studies personally reviewed.    CT ABDOMEN+PELVIS(CPT=74176)   Final Result   PROCEDURE:  CT ABDOMEN+PELVIS (CPT=74176)       COMPARISON:  [facility] , CT, CT ABDOMEN+PELVIS(CONTRAST ONLY)(CPT=74177),    4/22/2024, 10:41 PM.       INDICATIONS:  Abdominal pain and rectal bleeding. States that it has been    ongoing. +N/V       TECHNIQUE:  Unenhanced multislice CT scanning was performed from the dome    of the diaphragm to the pubic symphysis.  Dose reduction techniques were    used. Dose information is transmitted to the ACR (American College of    Radiology) NRDR (National Radiology    Data Registry) which includes the Dose Index Registry.       PATIENT STATED HISTORY: (As transcribed by Technologist)  Abdominal pain    and rectal bleeding            FINDINGS:     LUNG BASE:  Pleural parenchymal opacity in small effusion in the right    lung base.  Linear and discoid atelectasis right lower lobe.  No    significant change..   LIVER:  Unremarkable   BILIARY:  No biliary ductal dilatation.   PANCREAS:  Unremarkable    SPLEEN:  Normal caliber.   KIDNEYS:  No hydronephrosis or cholelithiasis.   ADRENALS:  Normal.   AORTA/VASCULAR:  No aneurysm.    RETROPERITONEUM:  No enlarged adenopathy.   BOWEL/MESENTERY:  Normal caliber appendix. Uncomplicated colonic    diverticulosis.  There is a large amount of stool in the colon.  Stomach    is mildly distended with food.   ABDOMINAL WALL:  No ventral wall hernia.   PELVIC ORGANS:  Circumferential wall thickening of the urinary bladder.     Recommend clinical correlation to exclude cystitis.     BONES:  Degenerative disc disease L4-5 and L5-S1 levels disc space    narrowing.  Mild degenerative changes in the lower lumbar facets.                          =====   CONCLUSION:  Normal caliber appendix.       Small right pleural effusion with pleural parenchymal scarring/atelectasis    in the right lung base unchanged.       Circumferential wall thickening of the urinary bladder.  This may be seen    with cystitis.  Clinical correlation recommended.       LOCATION:  [id number]           Dictated by (CST): Arlyn Oglesby MD on 5/05/2024 at 8:18 AM        Finalized by (CST): Arlyn Oglesby MD on 5/05/2024 at 8:22 AM               ASSESSMENT & PLAN   44 year old male with PMH of Asthma, ADD, CHF COPD, CAD, DVT, PE, DM2, HTN, HL, ESRD on HD TTS here with abdominal pain were consulted to resume iHD while inhouse:     ESRD on HD:  -- TTS schedule last HD on Thursday unable to go to HD because of abdominal pain.  -- sp HD Sunday. UF 3L per pt. Notes usual UF 4-5L/ However with vomiting and diarrhea will plan for less UF again tomorrow  -- plan for HD tomorrow, 3K, UF as tolerated. HD directly managed   -- avoid nephrotoxins and renally adjust medications     Anemia in ESRD:  -- epo if hgb < 10      Hyperphosphatemia/CKD-MBD:  -- resume renvela 800mg PO TID with meals.  -- check phos daily.     HTN:  -- improved sp HD. Continue home meds      Abdominal pain:  -- per primary    D/w RN    Thank you for allowing me to participate in the care of this patient. Please do not hesitate to call with questions or concerns.       Samaria  MD Brandy  [facility]

## 2024-05-06 NOTE — PROGRESS NOTES
1535: Called [facility] to schedule inpatient hemodialysis for tomorrow.    1616:  Dr Ryder paged regarding pts low blood pressures.  Awaiting response.  1623: No new orders, will continue to monitor BP, and will notify nephrology if BP continues to run low.

## 2024-05-07 LAB
ALBUMIN SERPL-MCNC: 3.4 G/DL (ref 3.4–5)
ANION GAP SERPL CALC-SCNC: 10 MMOL/L (ref 0–18)
BUN BLD-MCNC: 56 MG/DL (ref 9–23)
CALCIUM BLD-MCNC: 8.1 MG/DL (ref 8.5–10.1)
CHLORIDE SERPL-SCNC: 105 MMOL/L (ref 98–112)
CO2 SERPL-SCNC: 23 MMOL/L (ref 21–32)
CREAT BLD-MCNC: 8.88 MG/DL
EGFRCR SERPLBLD CKD-EPI 2021: 7 ML/MIN/1.73M2 (ref 60–?)
ERYTHROCYTE [DISTWIDTH] IN BLOOD BY AUTOMATED COUNT: 13.1 %
GLUCOSE BLD-MCNC: 100 MG/DL (ref 70–99)
GLUCOSE BLD-MCNC: 106 MG/DL (ref 70–99)
GLUCOSE BLD-MCNC: 109 MG/DL (ref 70–99)
GLUCOSE BLD-MCNC: 123 MG/DL (ref 70–99)
GLUCOSE BLD-MCNC: 187 MG/DL (ref 70–99)
HCT VFR BLD AUTO: 30.7 %
HGB BLD-MCNC: 9.9 G/DL
MAGNESIUM SERPL-MCNC: 2.6 MG/DL (ref 1.6–2.6)
MCH RBC QN AUTO: 31.5 PG (ref 26–34)
MCHC RBC AUTO-ENTMCNC: 32.2 G/DL (ref 31–37)
MCV RBC AUTO: 97.8 FL
OSMOLALITY SERPL CALC.SUM OF ELEC: 306 MOSM/KG (ref 275–295)
PHOSPHATE SERPL-MCNC: 6.1 MG/DL (ref 2.5–4.9)
PLATELET # BLD AUTO: 221 10(3)UL (ref 150–450)
PLATELETS.RETICULATED NFR BLD AUTO: 3.4 % (ref 0–7)
POTASSIUM SERPL-SCNC: 4.4 MMOL/L (ref 3.5–5.1)
RBC # BLD AUTO: 3.14 X10(6)UL
SODIUM SERPL-SCNC: 138 MMOL/L (ref 136–145)
WBC # BLD AUTO: 6.5 X10(3) UL (ref 4–11)

## 2024-05-07 PROCEDURE — 82962 GLUCOSE BLOOD TEST: CPT

## 2024-05-07 PROCEDURE — 90935 HEMODIALYSIS ONE EVALUATION: CPT | Performed by: INTERNAL MEDICINE

## 2024-05-07 PROCEDURE — 80069 RENAL FUNCTION PANEL: CPT | Performed by: INTERNAL MEDICINE

## 2024-05-07 PROCEDURE — 83735 ASSAY OF MAGNESIUM: CPT | Performed by: INTERNAL MEDICINE

## 2024-05-07 PROCEDURE — 85027 COMPLETE CBC AUTOMATED: CPT | Performed by: INTERNAL MEDICINE

## 2024-05-07 NOTE — PROGRESS NOTES
[facility]   part of [facility]    Nephrology Progress Note    Godwin Fonseca Attending:  Laila Ryder MD     Cc: ESRD    SUBJECTIVE     No current diarrhea or vomiting   Notes BPs low yesterday, meds held   Notes as outpatient they stopped using heparin due to rectal bleeding he had   No other complaints     PHYSICAL EXAM   Vital signs: BP (!) 141/97 (BP Location: Right arm)   Pulse 92   Temp 97.5 °F (36.4 °C) (Oral)   Resp 22   Ht 6' 2\" (1.88 m)   Wt 238 lb (108 kg)   SpO2 93%   BMI 30.56 kg/m²   Temp (24hrs), Av.6 °F (36.4 °C), Min:97.3 °F (36.3 °C), Max:98.3 °F (36.8 °C)       Intake/Output Summary (Last 24 hours) at 2024 1458  Last data filed at 2024 1132  Gross per 24 hour   Intake 1200 ml   Output 600 ml   Net 600 ml     Wt Readings from Last 3 Encounters:   24 238 lb (108 kg)   24 269 lb (122 kg)   24 240 lb (108.9 kg)     General: NAD  HEENT: NCAT, EOMI, MMM  Neck: Supple   Cardiac: Regular rate and rhythm   Lungs: CTAB  Abdomen: Soft, non-tender, nondistended   Extremities: No edema  Neurologic: No asterxis  Skin: Warm and dry, no rashes     MEDS     Current Facility-Administered Medications   Medication Dose Route Frequency    cyclobenzaprine (Flexeril) tab 5 mg  5 mg Oral TID    HYDROcodone-acetaminophen (Norco) 5-325 MG per tab 1 tablet  1 tablet Oral Q6H PRN    hyoscyamine sulfate (LEVSIN) disintegrating tab 0.125 mg  0.125 mg Sublingual TID    dicyclomine (Bentyl) cap 10 mg  10 mg Oral QID    HYDROmorphone (Dilaudid) 1 MG/ML injection 0.4 mg  0.4 mg Intravenous Q2H PRN    Or    HYDROmorphone (Dilaudid) 1 MG/ML injection 0.8 mg  0.8 mg Intravenous Q2H PRN    Or    HYDROmorphone (Dilaudid) 1 MG/ML injection 1.2 mg  1.2 mg Intravenous Q2H PRN    hydrALAzine (Apresoline) 20 mg/mL injection 10 mg  10 mg Intravenous Q4H PRN    labetalol (Trandate) 5 mg/mL injection 20 mg  20 mg Intravenous Q4H PRN    acetaminophen (Tylenol Extra Strength) tab 500 mg  500 mg  Oral Q4H PRN    sennosides (Senokot) tab 17.2 mg  17.2 mg Oral Nightly PRN    bisacodyl (Dulcolax) 10 MG rectal suppository 10 mg  10 mg Rectal Daily PRN    ondansetron (Zofran) 4 MG/2ML injection 4 mg  4 mg Intravenous Q6H PRN    prochlorperazine (Compazine) 10 MG/2ML injection 5 mg  5 mg Intravenous Q8H PRN    heparin (Porcine) 5000 UNIT/ML injection 5,000 Units  5,000 Units Subcutaneous Q8H MARTHA    bisacodyl (Dulcolax) DR tab 15 mg  15 mg Oral Daily    carvedilol (Coreg) tab 25 mg  25 mg Oral BID with meals    cloNIDine (Catapres) tab 0.1 mg  0.1 mg Oral Daily    docusate sodium (Colace) cap 100 mg  100 mg Oral BID PRN    FLUoxetine (PROzac) cap 40 mg  40 mg Oral Daily    fluticasone propionate (Flonase) 50 MCG/ACT nasal suspension 1 spray  1 spray Each Nare BID    lamoTRIgine (LaMICtal) tab 150 mg  150 mg Oral Daily    polyethylene glycol (PEG 3350) (Miralax) 17 g oral packet 17 g  17 g Oral BID    albuterol (Ventolin HFA) 108 (90 Base) MCG/ACT inhaler 2 puff  2 puff Inhalation Q4H PRN    buPROPion ER (Wellbutrin XL) 24 hr tab 150 mg  150 mg Oral Daily    hydrALAZINE (Apresoline) tab 50 mg  50 mg Oral BID    isosorbide mononitrate ER (Imdur) 24 hr tab 30 mg  30 mg Oral Daily    amphetamine-dextroamphetamine (Adderall) tab 10 mg  10 mg Oral BID AC    losartan (Cozaar) tab 100 mg  100 mg Oral Daily    NIFEdipine ER (Procardia-XL) 24 hr tab 60 mg  60 mg Oral BID    sevelamer carbonate (Renvela) tab 800 mg  800 mg Oral TID CC    umeclidinium bromide (Incruse Ellipta) 62.5 MCG/ACT inhaler 1 puff  1 puff Inhalation Daily    glucose (Dex4) 15 GM/59ML oral liquid 15 g  15 g Oral Q15 Min PRN    Or    glucose (Glutose) 40% oral gel 15 g  15 g Oral Q15 Min PRN    Or    glucose-vitamin C (Dex-4) chewable tab 4 tablet  4 tablet Oral Q15 Min PRN    Or    dextrose 50% injection 50 mL  50 mL Intravenous Q15 Min PRN    Or    glucose (Dex4) 15 GM/59ML oral liquid 30 g  30 g Oral Q15 Min PRN    Or    glucose (Glutose) 40% oral gel  30 g  30 g Oral Q15 Min PRN    Or    glucose-vitamin C (Dex-4) chewable tab 8 tablet  8 tablet Oral Q15 Min PRN    insulin aspart (NovoLOG) 100 Units/mL FlexPen 1-5 Units  1-5 Units Subcutaneous TID AC and HS    heparin (Porcine) 1000 UNIT/ML injection 5,000 Units  5,000 Units Intravenous PRN Dialysis    diphenhydrAMINE (Benadryl) 50 mg/mL  injection 12.5 mg  12.5 mg Intravenous Q4H PRN    Or    diphenhydrAMINE (Benadryl) cap/tab 25 mg  25 mg Oral Q4H PRN    ARIPiprazole (Abilify) tab 5 mg  5 mg Oral Daily       LABS     Lab Results   Component Value Date    WBC 6.5 05/07/2024    HGB 9.9 05/07/2024    HCT 30.7 05/07/2024    .0 05/07/2024    CREATSERUM 8.88 05/07/2024    BUN 56 05/07/2024     05/07/2024    K 4.4 05/07/2024     05/07/2024    CO2 23.0 05/07/2024     05/07/2024    CA 8.1 05/07/2024    ALB 3.4 05/07/2024    MG 2.6 05/07/2024    PHOS 6.1 05/07/2024    PGLU 109 05/07/2024       IMAGING   All imaging studies personally reviewed.    CT ABDOMEN+PELVIS(CPT=74176)   Final Result   PROCEDURE:  CT ABDOMEN+PELVIS (CPT=74176)       COMPARISON:  [facility] , CT, CT ABDOMEN+PELVIS(CONTRAST ONLY)(CPT=74177),    4/22/2024, 10:41 PM.       INDICATIONS:  Abdominal pain and rectal bleeding. States that it has been    ongoing. +N/V       TECHNIQUE:  Unenhanced multislice CT scanning was performed from the dome    of the diaphragm to the pubic symphysis.  Dose reduction techniques were    used. Dose information is transmitted to the ACR (American College of    Radiology) NRDR (National Radiology    Data Registry) which includes the Dose Index Registry.       PATIENT STATED HISTORY: (As transcribed by Technologist)  Abdominal pain    and rectal bleeding            FINDINGS:     LUNG BASE:  Pleural parenchymal opacity in small effusion in the right    lung base.  Linear and discoid atelectasis right lower lobe.  No    significant change..   LIVER:  Unremarkable   BILIARY:  No biliary ductal dilatation.    PANCREAS:  Unremarkable    SPLEEN:  Normal caliber.   KIDNEYS:  No hydronephrosis or cholelithiasis.   ADRENALS:  Normal.   AORTA/VASCULAR:  No aneurysm.   RETROPERITONEUM:  No enlarged adenopathy.   BOWEL/MESENTERY:  Normal caliber appendix. Uncomplicated colonic    diverticulosis.  There is a large amount of stool in the colon.  Stomach    is mildly distended with food.   ABDOMINAL WALL:  No ventral wall hernia.   PELVIC ORGANS:  Circumferential wall thickening of the urinary bladder.     Recommend clinical correlation to exclude cystitis.     BONES:  Degenerative disc disease L4-5 and L5-S1 levels disc space    narrowing.  Mild degenerative changes in the lower lumbar facets.                          =====   CONCLUSION:  Normal caliber appendix.       Small right pleural effusion with pleural parenchymal scarring/atelectasis    in the right lung base unchanged.       Circumferential wall thickening of the urinary bladder.  This may be seen    with cystitis.  Clinical correlation recommended.       [facility]ATION:  [facility]           Dictated by (CST): Arlyn Oglesby MD on 5/05/2024 at 8:18 AM        Finalized by (CST): Arlyn Oglesby MD on 5/05/2024 at 8:22 AM               ASSESSMENT & PLAN   44 year old male with PMH of Asthma, ADD, CHF COPD, CAD, DVT, PE, DM2, HTN, HL, ESRD on HD TTS here with abdominal pain were consulted to resume iHD while inhouse:     ESRD on HD:  -- TTS schedule last HD on Thursday unable to go to HD because of abdominal pain.  -- sp HD Sunday. UF 3L per pt. Notes usual UF 4-5L/ However with vomiting and diarrhea will plan for less UF again tomorrow  -- plan for HD today, 3K, UF as tolerated. HD directly managed   -- avoid nephrotoxins and renally adjust medications     Anemia in ESRD:  -- epo if hgb < 10      Hyperphosphatemia/CKD-MBD:  -- resume renvela 800mg PO TID with meals.  -- check phos daily.     HTN:  -- improved sp HD. Continue home meds w hold parameters to hold if < 130 given low  BPs, likely due to illness/etc      Abdominal pain:  -- per primary    D/w RN    Thank you for allowing me to participate in the care of this patient. Please do not hesitate to call with questions or concerns.       Samaria Nava MD  [facility]

## 2024-05-07 NOTE — PLAN OF CARE
A&Ox4. Bps low, nephro paged, orders to hold bp meds. RA. Denies chest pain and SOB.   Telemetry: NSR.  GI: Abdomen soft, round, tender. Passing gas.   Denies nausea.   : low urine output, HD patient  Attempting to control pain with po pain medications, bp noted to drop after iv pain med administration.   Up with standby assist.   Diet: tolerating regular diet  IV SL   All appropriate safety measures in place. All questions and concerns addressed

## 2024-05-07 NOTE — PROGRESS NOTES
.[facility] note    PCP: Prieto Novak MD    Chief Complaint:  Abd pain, htn    SUBJECTIVE:  Had hypotension yesterday evening and felt LH at that time, states this does happen with HD sometimes and he is told to hold his bp meds   No n/v, did eat something today. Feels that combo of oral pain meds are helping but still asking for iv pain meds prn    OBJECTIVE:  Temp:  [97.3 °F (36.3 °C)-98.3 °F (36.8 °C)] 97.5 °F (36.4 °C)  Pulse:  [76-96] 92  Resp:  [18-22] 22  BP: ()/(46-97) 141/97  SpO2:  [92 %-98 %] 93 %    Intake/Output:    Intake/Output Summary (Last 24 hours) at 5/7/2024 1614  Last data filed at 5/7/2024 1132  Gross per 24 hour   Intake 1200 ml   Output 600 ml   Net 600 ml       Last 3 Weights   05/05/24 1327 238 lb (108 kg)   05/05/24 0352 238 lb (108 kg)   04/22/24 2201 269 lb (122 kg)   04/02/24 1952 240 lb (108.9 kg)   04/02/24 1351 240 lb (108.9 kg)       Exam  Gen: No acute distress  HEENT: anicteric sclera, MMM  Pulm: Lungs clear, normal respiratory effort  CV: Heart with regular rate and rhythm, no peripheral edema  Abd: Abdomen soft, nontender, nondistended, no organomegaly, bowel sounds present  MSK: Full range of motion in extremities, no clubbing, no cyanosis  Skin: no rashes or lesions  Neuro: A&OX3, no focal deficits    Data Review:         Labs:     Recent Labs   Lab 05/05/24  0424 05/07/24  0636   WBC 8.8 6.5   HGB 10.8* 9.9*   MCV 91.1 97.8   .0 221.0   NE 6.14  --    LYMABS 1.47  --        Recent Labs   Lab 05/05/24  0425 05/06/24  0542 05/07/24  0636    137 138   K 4.3 4.4 4.4    106 105   CO2 26.0 23.0 23.0   BUN 64* 39* 56*   CREATSERUM 9.29* 6.82* 8.88*   CA 8.5 8.9 8.1*   MG  --  2.6 2.6   PHOS  --  4.9 6.1*   * 178* 187*       Recent Labs   Lab 05/05/24  0425 05/06/24  0542 05/07/24  0636   ALT 21  --   --    AST 32  --   --    ALB 4.0 3.8 3.4       No results for input(s): \"TROP\", \"CK\", \"PBNP\", \"PCT\" in the last 168 hours.    No results for  input(s): \"CRP\", \"HARJEET\", \"LDH\", \"DDIMER\" in the last 168 hours.    Recent Labs   Lab 05/06/24  1101 05/06/24  1824 05/06/24  2045 05/07/24  0509 05/07/24  1132   PGLU 138* 115* 172* 100* 109*       Meds:   Scheduled Medication:   cyclobenzaprine  5 mg Oral TID    hyoscyamine sulfate  0.125 mg Sublingual TID    dicyclomine  10 mg Oral QID    heparin  5,000 Units Subcutaneous Q8H MARTHA    bisacodyl  15 mg Oral Daily    carvedilol  25 mg Oral BID with meals    cloNIDine  0.1 mg Oral Daily    FLUoxetine HCl  40 mg Oral Daily    fluticasone propionate  1 spray Each Nare BID    lamoTRIgine  150 mg Oral Daily    polyethylene glycol (PEG 3350)  17 g Oral BID    buPROPion ER  150 mg Oral Daily    hydrALAZINE  50 mg Oral BID    isosorbide mononitrate ER  30 mg Oral Daily    amphetamine-dextroamphetamine  10 mg Oral BID AC    losartan  100 mg Oral Daily    NIFEdipine ER  60 mg Oral BID    sevelamer carbonate  800 mg Oral TID CC    umeclidinium bromide  1 puff Inhalation Daily    insulin aspart  1-5 Units Subcutaneous TID AC and HS    ARIPiprazole  5 mg Oral Daily     Continuous Infusing Medication:  PRN Medication:  HYDROcodone-acetaminophen    HYDROmorphone **OR** HYDROmorphone **OR** HYDROmorphone    hydrALAzine    labetalol    acetaminophen    sennosides    bisacodyl    ondansetron    prochlorperazine    docusate sodium    albuterol    glucose **OR** glucose **OR** glucose-vitamin C **OR** dextrose **OR** glucose **OR** glucose **OR** glucose-vitamin C    heparin    diphenhydrAMINE **OR** diphenhydrAMINE       Microbiology:    No results found for this visit on 05/05/24.    Lab Results   Component Value Date    COVID19 Not Detected 03/16/2024    COVID19 Not Detected 03/09/2024    COVID19 Not Detected 03/08/2024        Assessment/Plan:   43 year old male with history of asthma, ADD, bipolar 1, end-stage renal disease on dialysis, COPD, CHF, coronary disease, history of DVT and PE, depression, type 2 diabetes, hypertension,  hyperlipidemia, migraines, neuropathy who presented after missing HD with severe BP elevation and continuation of abd pain.         Esrd on hd, missed hd  - underwent HD 5/5 and again today, nephro following     Uncontrolled htn  Intermittent hypotension  - getting HD today and bp better  - occ has to hold his bp meds when he runs low  - will assess how he does after today's session     Chronic abd pain, gi bleeding, recurrent  - has had extensive w/u with imaging and endoscopies.   - he today also has complaint of early satiety and feeling like food getting stuck- per review of previous notes, he had an EGD at [facility] with ?schatzki's ring  - he is to see gen surg for rectal bleeding/internal hemorrhoids, appt now moved to 5/15. May be also helpful to discuss with them if there could be scar tissue contributing from previous PD placement. C-scope 3/23 mentioned sig left sided extrinsic fixation, diverticula, polyps. The latter was also noted during subsequent c-scope 12/23.   - he has been on meds for bowel spasm including levsin and bentyl  - he reports that he also has almost a muscle spasm like sensation in LLQ. Seems to have better pain control on norco and flexeril. He still asks for iv dilaudid periodically because it works more quickly but understands he will not receive this on dc.   - If no further recs forthcoming from gen surg, he should f/u with his chronic pain specialist (he sees for cervical spine issues)     ADD  Bipolar  Depression  - cont home regimen     Asthma  COPD  -continue inhalers           Quality:  DVT Prophylaxis: heparin sq  CODE status: Full per chart  Abbott: none          Laila Ryder MD  [facility]  Pager: [phone]

## 2024-05-07 NOTE — DIETARY NOTE
[facility]   part of [facility]   CLINICAL NUTRITION    Godwin Fonseca     Admitting diagnosis:  Abdominal pain, left lower quadrant [R10.32]  ESRD (end stage renal disease) (HCC) [N18.6]  Resistant hypertension [I1A.0]    Ht: 188 cm (6' 2\")  Wt: 108 kg (238 lb).   Body mass index is 30.56 kg/m².  IBW: 86 kg    Wt Readings from Last 6 Encounters:   05/05/24 108 kg (238 lb)   04/22/24 122 kg (269 lb)   04/02/24 108.9 kg (240 lb)   03/23/24 104 kg (229 lb 4.5 oz)   03/16/24 111.1 kg (245 lb)   03/10/24 112 kg (246 lb 14.6 oz)        Labs/Meds reviewed    Diet:       Procedures    Regular/General diet Is Patient on Accuchecks? No     Percent Meals Eaten (last 3 days)       Date/Time Percent Meals Eaten (%)    05/06/24 0834 90 %    05/06/24 1414 100 %    05/06/24 1848 100 %    05/07/24 1132 100 %              Pt chart reviewed d/t +MST  44 year old male with here with abdominal pain.  Nursing notes reports Percent Meals Eaten (%): 100 % intake for last meal.  Tolerating po diet without diarrhea, emesis, or constipation.   No new wt since admission.     PMH of Asthma, CHF COPD, CAD, DVT, PE, DM2, HTN, HL, ESRD on HD TTS    Patient is at low nutrition risk at this time.    Please consult if patient status changes or nutrition issues arise.    Radha Santoyo RD, LDN  Clinical Nutrition  [id number]

## 2024-05-07 NOTE — PROGRESS NOTES
A/Ox4, RA, tele monitoring, tolerating regular diet well.  Low urine output- HD patient.  Pain managed with IV dilauded and PO norco - trying to advance to PO only.  Hemodialysis today.  Saline locked.  Call light and belongings within reach, pt updated on plan of care, all questions and concerns addressed at this time.

## 2024-05-08 LAB
ALBUMIN SERPL-MCNC: 3.7 G/DL (ref 3.4–5)
ANION GAP SERPL CALC-SCNC: 7 MMOL/L (ref 0–18)
BUN BLD-MCNC: 35 MG/DL (ref 9–23)
CALCIUM BLD-MCNC: 8.6 MG/DL (ref 8.5–10.1)
CHLORIDE SERPL-SCNC: 104 MMOL/L (ref 98–112)
CO2 SERPL-SCNC: 26 MMOL/L (ref 21–32)
CREAT BLD-MCNC: 6.97 MG/DL
EGFRCR SERPLBLD CKD-EPI 2021: 9 ML/MIN/1.73M2 (ref 60–?)
GLUCOSE BLD-MCNC: 108 MG/DL (ref 70–99)
GLUCOSE BLD-MCNC: 135 MG/DL (ref 70–99)
GLUCOSE BLD-MCNC: 153 MG/DL (ref 70–99)
GLUCOSE BLD-MCNC: 162 MG/DL (ref 70–99)
GLUCOSE BLD-MCNC: 192 MG/DL (ref 70–99)
MAGNESIUM SERPL-MCNC: 2.2 MG/DL (ref 1.6–2.6)
OSMOLALITY SERPL CALC.SUM OF ELEC: 296 MOSM/KG (ref 275–295)
PHOSPHATE SERPL-MCNC: 5.5 MG/DL (ref 2.5–4.9)
POTASSIUM SERPL-SCNC: 4.2 MMOL/L (ref 3.5–5.1)
SODIUM SERPL-SCNC: 137 MMOL/L (ref 136–145)

## 2024-05-08 PROCEDURE — 82962 GLUCOSE BLOOD TEST: CPT

## 2024-05-08 PROCEDURE — 80069 RENAL FUNCTION PANEL: CPT | Performed by: INTERNAL MEDICINE

## 2024-05-08 PROCEDURE — 83735 ASSAY OF MAGNESIUM: CPT | Performed by: INTERNAL MEDICINE

## 2024-05-08 RX ORDER — HYDROCODONE BITARTRATE AND ACETAMINOPHEN 5; 325 MG/1; MG/1
1 TABLET ORAL EVERY 4 HOURS PRN
Status: DISCONTINUED | OUTPATIENT
Start: 2024-05-08 | End: 2024-05-09

## 2024-05-08 RX ORDER — HYDROCODONE BITARTRATE AND ACETAMINOPHEN 5; 325 MG/1; MG/1
2 TABLET ORAL EVERY 4 HOURS PRN
Status: DISCONTINUED | OUTPATIENT
Start: 2024-05-08 | End: 2024-05-09

## 2024-05-08 NOTE — PROGRESS NOTES
[facility]   part of [facility]    Nephrology Progress Note    Godwin Fonseca Attending:  Laila Ryder MD     Cc: ESRD    SUBJECTIVE     No current diarrhea or vomiting   Was getting IV dilaudid for pain   Notes some cramping after HD but not during   Notes BP dropping yesterday after HD     PHYSICAL EXAM   Vital signs: /68 (BP Location: Right arm)   Pulse 93   Temp 98.2 °F (36.8 °C) (Oral)   Resp 18   Ht 6' 2\" (1.88 m)   Wt 238 lb (108 kg)   SpO2 96%   BMI 30.56 kg/m²   Temp (24hrs), Av.9 °F (36.6 °C), Min:97.4 °F (36.3 °C), Max:98.5 °F (36.9 °C)       Intake/Output Summary (Last 24 hours) at 2024 1550  Last data filed at 2024 1356  Gross per 24 hour   Intake 1605 ml   Output 2700 ml   Net -1095 ml     Wt Readings from Last 3 Encounters:   24 238 lb (108 kg)   24 269 lb (122 kg)   24 240 lb (108.9 kg)     General: NAD  HEENT: NCAT, EOMI, MMM  Neck: Supple   Cardiac: Regular rate and rhythm   Lungs: CTAB  Abdomen: Soft, non-tender, nondistended   Extremities: No edema  Neurologic: No asterxis  Skin: Warm and dry, no rashes     MEDS     Current Facility-Administered Medications   Medication Dose Route Frequency    HYDROcodone-acetaminophen (Norco) 5-325 MG per tab 1 tablet  1 tablet Oral Q4H PRN    Or    HYDROcodone-acetaminophen (Norco) 5-325 MG per tab 2 tablet  2 tablet Oral Q4H PRN    cyclobenzaprine (Flexeril) tab 5 mg  5 mg Oral TID    hyoscyamine sulfate (LEVSIN) disintegrating tab 0.125 mg  0.125 mg Sublingual TID    dicyclomine (Bentyl) cap 10 mg  10 mg Oral QID    HYDROmorphone (Dilaudid) 1 MG/ML injection 0.4 mg  0.4 mg Intravenous Q2H PRN    Or    HYDROmorphone (Dilaudid) 1 MG/ML injection 0.8 mg  0.8 mg Intravenous Q2H PRN    Or    HYDROmorphone (Dilaudid) 1 MG/ML injection 1.2 mg  1.2 mg Intravenous Q2H PRN    hydrALAzine (Apresoline) 20 mg/mL injection 10 mg  10 mg Intravenous Q4H PRN    labetalol (Trandate) 5 mg/mL injection 20 mg  20 mg Intravenous  Q4H PRN    acetaminophen (Tylenol Extra Strength) tab 500 mg  500 mg Oral Q4H PRN    sennosides (Senokot) tab 17.2 mg  17.2 mg Oral Nightly PRN    bisacodyl (Dulcolax) 10 MG rectal suppository 10 mg  10 mg Rectal Daily PRN    ondansetron (Zofran) 4 MG/2ML injection 4 mg  4 mg Intravenous Q6H PRN    prochlorperazine (Compazine) 10 MG/2ML injection 5 mg  5 mg Intravenous Q8H PRN    heparin (Porcine) 5000 UNIT/ML injection 5,000 Units  5,000 Units Subcutaneous Q8H MARTHA    bisacodyl (Dulcolax) DR tab 15 mg  15 mg Oral Daily    carvedilol (Coreg) tab 25 mg  25 mg Oral BID with meals    cloNIDine (Catapres) tab 0.1 mg  0.1 mg Oral Daily    docusate sodium (Colace) cap 100 mg  100 mg Oral BID PRN    FLUoxetine (PROzac) cap 40 mg  40 mg Oral Daily    fluticasone propionate (Flonase) 50 MCG/ACT nasal suspension 1 spray  1 spray Each Nare BID    lamoTRIgine (LaMICtal) tab 150 mg  150 mg Oral Daily    polyethylene glycol (PEG 3350) (Miralax) 17 g oral packet 17 g  17 g Oral BID    albuterol (Ventolin HFA) 108 (90 Base) MCG/ACT inhaler 2 puff  2 puff Inhalation Q4H PRN    buPROPion ER (Wellbutrin XL) 24 hr tab 150 mg  150 mg Oral Daily    hydrALAZINE (Apresoline) tab 50 mg  50 mg Oral BID    isosorbide mononitrate ER (Imdur) 24 hr tab 30 mg  30 mg Oral Daily    amphetamine-dextroamphetamine (Adderall) tab 10 mg  10 mg Oral BID AC    losartan (Cozaar) tab 100 mg  100 mg Oral Daily    NIFEdipine ER (Procardia-XL) 24 hr tab 60 mg  60 mg Oral BID    sevelamer carbonate (Renvela) tab 800 mg  800 mg Oral TID CC    umeclidinium bromide (Incruse Ellipta) 62.5 MCG/ACT inhaler 1 puff  1 puff Inhalation Daily    glucose (Dex4) 15 GM/59ML oral liquid 15 g  15 g Oral Q15 Min PRN    Or    glucose (Glutose) 40% oral gel 15 g  15 g Oral Q15 Min PRN    Or    glucose-vitamin C (Dex-4) chewable tab 4 tablet  4 tablet Oral Q15 Min PRN    Or    dextrose 50% injection 50 mL  50 mL Intravenous Q15 Min PRN    Or    glucose (Dex4) 15 GM/59ML oral liquid  30 g  30 g Oral Q15 Min PRN    Or    glucose (Glutose) 40% oral gel 30 g  30 g Oral Q15 Min PRN    Or    glucose-vitamin C (Dex-4) chewable tab 8 tablet  8 tablet Oral Q15 Min PRN    insulin aspart (NovoLOG) 100 Units/mL FlexPen 1-5 Units  1-5 Units Subcutaneous TID AC and HS    heparin (Porcine) 1000 UNIT/ML injection 5,000 Units  5,000 Units Intravenous PRN Dialysis    diphenhydrAMINE (Benadryl) 50 mg/mL  injection 12.5 mg  12.5 mg Intravenous Q4H PRN    Or    diphenhydrAMINE (Benadryl) cap/tab 25 mg  25 mg Oral Q4H PRN    ARIPiprazole (Abilify) tab 5 mg  5 mg Oral Daily       LABS     Lab Results   Component Value Date    CREATSERUM 6.97 05/08/2024    BUN 35 05/08/2024     05/08/2024    K 4.2 05/08/2024     05/08/2024    CO2 26.0 05/08/2024     05/08/2024    CA 8.6 05/08/2024    ALB 3.7 05/08/2024    MG 2.2 05/08/2024    PHOS 5.5 05/08/2024    PGLU 135 05/08/2024       IMAGING   All imaging studies personally reviewed.    CT ABDOMEN+PELVIS(CPT=74176)   Final Result   PROCEDURE:  CT ABDOMEN+PELVIS (CPT=74176)       COMPARISON:  [facility] , CT, CT ABDOMEN+PELVIS(CONTRAST ONLY)(CPT=74177),    4/22/2024, 10:41 PM.       INDICATIONS:  Abdominal pain and rectal bleeding. States that it has been    ongoing. +N/V       TECHNIQUE:  Unenhanced multislice CT scanning was performed from the dome    of the diaphragm to the pubic symphysis.  Dose reduction techniques were    used. Dose information is transmitted to the ACR (American College of    Radiology) NRDR (National Radiology    Data Registry) which includes the Dose Index Registry.       PATIENT STATED HISTORY: (As transcribed by Technologist)  Abdominal pain    and rectal bleeding            FINDINGS:     LUNG BASE:  Pleural parenchymal opacity in small effusion in the right    lung base.  Linear and discoid atelectasis right lower lobe.  No    significant change..   LIVER:  Unremarkable   BILIARY:  No biliary ductal dilatation.   PANCREAS:   Unremarkable    SPLEEN:  Normal caliber.   KIDNEYS:  No hydronephrosis or cholelithiasis.   ADRENALS:  Normal.   AORTA/VASCULAR:  No aneurysm.   RETROPERITONEUM:  No enlarged adenopathy.   BOWEL/MESENTERY:  Normal caliber appendix. Uncomplicated colonic    diverticulosis.  There is a large amount of stool in the colon.  Stomach    is mildly distended with food.   ABDOMINAL WALL:  No ventral wall hernia.   PELVIC ORGANS:  Circumferential wall thickening of the urinary bladder.     Recommend clinical correlation to exclude cystitis.     BONES:  Degenerative disc disease L4-5 and L5-S1 levels disc space    narrowing.  Mild degenerative changes in the lower lumbar facets.                          =====   CONCLUSION:  Normal caliber appendix.       Small right pleural effusion with pleural parenchymal scarring/atelectasis    in the right lung base unchanged.       Circumferential wall thickening of the urinary bladder.  This may be seen    with cystitis.  Clinical correlation recommended.       LOCATION:  [facility]           Dictated by (CST): Arlyn Oglesby MD on 5/05/2024 at 8:18 AM        Finalized by (CST): Arlyn Oglesby MD on 5/05/2024 at 8:22 AM               ASSESSMENT & PLAN   44 year old male with PMH of Asthma, ADD, CHF COPD, CAD, DVT, PE, DM2, HTN, HL, ESRD on HD TTS here with abdominal pain were consulted to resume iHD while inhouse:     ESRD on HD:  -- TTS schedule last HD on Thursday unable to go to HD because of abdominal pain.  -- sp HD Sunday. UF 3L per pt. Notes usual UF 4-5L/ However with vomiting and diarrhea will plan for less UF again tomorrow  -- plan for HD tomorrow, 3K, UF less given hypotension/cramping, as tolerated. HD directly managed   -- avoid nephrotoxins and renally adjust medications     Anemia in ESRD:  -- epo if hgb < 10      Hyperphosphatemia/CKD-MBD:  -- resume renvela 800mg PO TID with meals.  -- check phos daily.     HTN:  -- improved sp HD. Continue home meds w hold parameters to  hold if < 130 given low BPs, likely due to illness/etc   -- hold BP meds morning of HD      Abdominal pain:  -- per primary    D/w Dr Goodman    Thank you for allowing me to participate in the care of this patient. Please do not hesitate to call with questions or concerns.       Samaria Nava MD  [facility]

## 2024-05-08 NOTE — PLAN OF CARE
A&Ox4. VSS. RA. Denies chest pain and SOB.   Telemetry: NSR-ST  GI: Abdomen soft, rounded, tender. Passing gas.   Denies nausea.   : low urine output at baseline HD patient   Pain difficult to control with PRN pain medications.   Up with standby assist.  HD cath clamped  Diet: tolerating regular diet  IV SL per order.   All appropriate safety measures in place. All questions and concerns addressed

## 2024-05-08 NOTE — PROGRESS NOTES
.[facility] note    PCP: Prieto Novak MD    Chief Complaint:  Abd pain, htn    SUBJECTIVE:  Having his chronic abdominal pain- says pain meds help but don't last long enough.  Required IV dilaudid this AM. No n/v/f/c    OBJECTIVE:  Temp:  [97.4 °F (36.3 °C)-98.5 °F (36.9 °C)] 97.4 °F (36.3 °C)  Pulse:  [] 99  Resp:  [18-20] 18  BP: (135-149)/(74-92) 149/74  SpO2:  [97 %-100 %] 98 %    Intake/Output:    Intake/Output Summary (Last 24 hours) at 5/8/2024 1159  Last data filed at 5/8/2024 1144  Gross per 24 hour   Intake 1145 ml   Output 2700 ml   Net -1555 ml       Last 3 Weights   05/05/24 1327 238 lb (108 kg)   05/05/24 0352 238 lb (108 kg)   04/22/24 2201 269 lb (122 kg)   04/02/24 1952 240 lb (108.9 kg)   04/02/24 1351 240 lb (108.9 kg)       Exam  Gen: sitting up in bed, occasionally grimacing--saying sometimes he gets spasms to abdomen  Pulm: Lungs clear, normal respiratory effort  CV: Heart with regular rate and rhythm, no peripheral edema  Abd: Abdomen soft, nontender, nondistended, no organomegaly, bowel sounds present  MSK:  no clubbing, no cyanosis  Skin: no visible rashes   Neuro: no facial droop or dysarthria      Data Review:         Labs:     Recent Labs   Lab 05/05/24 0424 05/07/24  0636   WBC 8.8 6.5   HGB 10.8* 9.9*   MCV 91.1 97.8   .0 221.0   NE 6.14  --    LYMABS 1.47  --        Recent Labs   Lab 05/05/24  0425 05/06/24  0542 05/07/24  0636 05/08/24  0627    137 138 137   K 4.3 4.4 4.4 4.2    106 105 104   CO2 26.0 23.0 23.0 26.0   BUN 64* 39* 56* 35*   CREATSERUM 9.29* 6.82* 8.88* 6.97*   CA 8.5 8.9 8.1* 8.6   MG  --  2.6 2.6 2.2   PHOS  --  4.9 6.1* 5.5*   * 178* 187* 162*       Recent Labs   Lab 05/05/24  0425 05/06/24  0542 05/07/24  0636 05/08/24  0627   ALT 21  --   --   --    AST 32  --   --   --    ALB 4.0 3.8 3.4 3.7          Recent Labs   Lab 05/07/24  1132 05/07/24  1701 05/07/24  2105 05/08/24  0626 05/08/24  1123   PGLU 109* 106* 123* 153*  135*       Meds:   Scheduled Medication:   cyclobenzaprine  5 mg Oral TID    hyoscyamine sulfate  0.125 mg Sublingual TID    dicyclomine  10 mg Oral QID    heparin  5,000 Units Subcutaneous Q8H MARTHA    bisacodyl  15 mg Oral Daily    carvedilol  25 mg Oral BID with meals    cloNIDine  0.1 mg Oral Daily    FLUoxetine HCl  40 mg Oral Daily    fluticasone propionate  1 spray Each Nare BID    lamoTRIgine  150 mg Oral Daily    polyethylene glycol (PEG 3350)  17 g Oral BID    buPROPion ER  150 mg Oral Daily    hydrALAZINE  50 mg Oral BID    isosorbide mononitrate ER  30 mg Oral Daily    amphetamine-dextroamphetamine  10 mg Oral BID AC    losartan  100 mg Oral Daily    NIFEdipine ER  60 mg Oral BID    sevelamer carbonate  800 mg Oral TID CC    umeclidinium bromide  1 puff Inhalation Daily    insulin aspart  1-5 Units Subcutaneous TID AC and HS    ARIPiprazole  5 mg Oral Daily     Continuous Infusing Medication:  PRN Medication:  HYDROcodone-acetaminophen **OR** HYDROcodone-acetaminophen    HYDROmorphone **OR** HYDROmorphone **OR** HYDROmorphone    hydrALAzine    labetalol    acetaminophen    sennosides    bisacodyl    ondansetron    prochlorperazine    docusate sodium    albuterol    glucose **OR** glucose **OR** glucose-vitamin C **OR** dextrose **OR** glucose **OR** glucose **OR** glucose-vitamin C    heparin    diphenhydrAMINE **OR** diphenhydrAMINE       Microbiology:    No results found for this visit on 05/05/24.    Lab Results   Component Value Date    COVID19 Not Detected 03/16/2024    COVID19 Not Detected 03/09/2024    COVID19 Not Detected 03/08/2024        Assessment/Plan:   43 year old male with history of asthma, ADD, bipolar 1, end-stage renal disease on dialysis, COPD, CHF, coronary disease, history of DVT and PE, depression, type 2 diabetes, hypertension, hyperlipidemia, migraines, neuropathy who presented after missing HD with severe BP elevation and continuation of abd pain.         Esrd on hd, missed  hd  -nephrology on, appreciate- HD per nephrology     Uncontrolled htn  Intermittent hypotension  -  bp better with HD  - occ has to hold his bp meds when he runs low     Chronic abd pain, gi bleeding, recurrent  - has had extensive w/u with imaging and endoscopies.   -  has complaint of early satiety and feeling like food getting stuck- per review of previous notes, he had an EGD at [facility] with ?schatzki's ring. F/u outpatient  - he is to see gen surg for rectal bleeding/internal hemorrhoids, appt now moved to 5/15. May be also helpful to discuss with them if there could be scar tissue contributing from previous PD placement. C-scope 3/23 mentioned sig left sided extrinsic fixation, diverticula, polyps. The latter was also noted during subsequent c-scope 12/23.   - he has been on meds for bowel spasm including levsin and bentyl  - he reports that he also has almost a muscle spasm like sensation in LLQ. Seems to have better pain control on norco and flexeril. He still asks for iv dilaudid periodically because it works more quickly but understands he will not receive this on dc.   - If no further recs forthcoming from gen surg when he sees outpatient, he should f/u with his chronic pain specialist (he sees for cervical spine issues)     ADD  Bipolar  Depression  - cont home regimen     Asthma  COPD  -continue inhalers       Chronic anemia- monitor        Quality:  DVT Prophylaxis: heparin sq  CODE status: Full per chart  Abbott: none    Dw pt and RN        Thank You,  Velma Goodman MD    [facility]  Internal Medicine  Answering Service number: [phone]

## 2024-05-08 NOTE — PLAN OF CARE
Patient is alert, c/o abdominal pain and itching - PRNs per MAR. Fred notified of HD order for 5/9. Educated patient on plan to trial PO pain medication in preparation for discharge, patient states he would like to try at bedtime. Call light within reach.

## 2024-05-09 VITALS
HEIGHT: 74 IN | SYSTOLIC BLOOD PRESSURE: 133 MMHG | BODY MASS INDEX: 32.08 KG/M2 | TEMPERATURE: 98 F | RESPIRATION RATE: 18 BRPM | OXYGEN SATURATION: 100 % | WEIGHT: 250 LBS | DIASTOLIC BLOOD PRESSURE: 75 MMHG | HEART RATE: 80 BPM

## 2024-05-09 LAB
ALBUMIN SERPL-MCNC: 3.6 G/DL (ref 3.4–5)
ANION GAP SERPL CALC-SCNC: 9 MMOL/L (ref 0–18)
BUN BLD-MCNC: 50 MG/DL (ref 9–23)
CALCIUM BLD-MCNC: 8.6 MG/DL (ref 8.5–10.1)
CHLORIDE SERPL-SCNC: 105 MMOL/L (ref 98–112)
CO2 SERPL-SCNC: 23 MMOL/L (ref 21–32)
CREAT BLD-MCNC: 9.58 MG/DL
EGFRCR SERPLBLD CKD-EPI 2021: 6 ML/MIN/1.73M2 (ref 60–?)
ERYTHROCYTE [DISTWIDTH] IN BLOOD BY AUTOMATED COUNT: 12.6 %
GLUCOSE BLD-MCNC: 87 MG/DL (ref 70–99)
GLUCOSE BLD-MCNC: 92 MG/DL (ref 70–99)
GLUCOSE BLD-MCNC: 94 MG/DL (ref 70–99)
HCT VFR BLD AUTO: 29.8 %
HGB BLD-MCNC: 10.2 G/DL
MAGNESIUM SERPL-MCNC: 2.4 MG/DL (ref 1.6–2.6)
MCH RBC QN AUTO: 31.8 PG (ref 26–34)
MCHC RBC AUTO-ENTMCNC: 34.2 G/DL (ref 31–37)
MCV RBC AUTO: 92.8 FL
OSMOLALITY SERPL CALC.SUM OF ELEC: 297 MOSM/KG (ref 275–295)
PHOSPHATE SERPL-MCNC: 6.6 MG/DL (ref 2.5–4.9)
PLATELET # BLD AUTO: 213 10(3)UL (ref 150–450)
POTASSIUM SERPL-SCNC: 4.2 MMOL/L (ref 3.5–5.1)
RBC # BLD AUTO: 3.21 X10(6)UL
SODIUM SERPL-SCNC: 137 MMOL/L (ref 136–145)
WBC # BLD AUTO: 5.3 X10(3) UL (ref 4–11)

## 2024-05-09 PROCEDURE — 83735 ASSAY OF MAGNESIUM: CPT | Performed by: INTERNAL MEDICINE

## 2024-05-09 PROCEDURE — 80069 RENAL FUNCTION PANEL: CPT | Performed by: INTERNAL MEDICINE

## 2024-05-09 PROCEDURE — 82962 GLUCOSE BLOOD TEST: CPT

## 2024-05-09 PROCEDURE — 85027 COMPLETE CBC AUTOMATED: CPT | Performed by: HOSPITALIST

## 2024-05-09 RX ORDER — HYDROCODONE BITARTRATE AND ACETAMINOPHEN 5; 325 MG/1; MG/1
2 TABLET ORAL EVERY 4 HOURS PRN
Qty: 20 TABLET | Refills: 0 | Status: SHIPPED | OUTPATIENT
Start: 2024-05-09

## 2024-05-09 RX ORDER — HYOSCYAMINE SULFATE 0.125 MG
0.12 TABLET,DISINTEGRATING ORAL 3 TIMES DAILY
Qty: 30 TABLET | Refills: 0 | Status: SHIPPED | OUTPATIENT
Start: 2024-05-09

## 2024-05-09 RX ORDER — CLONIDINE HYDROCHLORIDE 0.1 MG/1
0.1 TABLET ORAL
COMMUNITY

## 2024-05-09 RX ORDER — DOCUSATE SODIUM 100 MG/1
100 CAPSULE, LIQUID FILLED ORAL 2 TIMES DAILY PRN
Qty: 30 CAPSULE | Refills: 0 | Status: SHIPPED | OUTPATIENT
Start: 2024-05-09

## 2024-05-09 NOTE — PLAN OF CARE
Received pt at 1930. Pt is A&O x 4; follows commands, immediately asking for pain medicine despite receiving it one hour prior. Pt is on RA, VSS, reg diet. Pt is continent of bowel and bladder. Pt's pain is stated severe whenever asked and ambulates with standby assist. IV access is patent currently SL; HD cath for TTS HD CDI. Shift assessment performed, HS meds given. NOC safety plan in place; bed in low position, call light and personal items within reach, pt encouraged to call staff for assistance.  POC:  Re-educated and enforced doctor's plan to get pt off IV pain meds. Though pt was resistant, PO Norco was given for pain control.

## 2024-05-09 NOTE — PROGRESS NOTES
Pt seen and examined.    Stable for dc from IM standpoint    Full note to follow    Dw pt and RN Toreyina

## 2024-05-09 NOTE — PLAN OF CARE
A&Ox4. VSS. RA. Denies chest pain and SOB.   Telemetry: NSR.   GI: Abdomen soft, nondistended. Passing gas. Belching present.   Intermittent nausea present r/t pain   : Voids.   Pain controlled with PRN pain medications.   Up independently   Drains: None  Incisions: None  Diet: General diet  Saline locked - tolerating PO intake. All appropriate safety measures in place. All questions and concerns addressed.

## 2024-05-09 NOTE — DISCHARGE SUMMARY
General Medicine Discharge Summary     Patient ID:  Godwin Fonseca  44 year old  [id number]  4/12/1980    Admit date: 5/5/2024    Discharge date and time:  5/9/24    Attending Physician: Velma Goodman, *     Primary Care Physician: Prieto Novak MD     Reason for admission: missed HD, abdominal pain      Risk of readmission: Godwin Fonseca has Moderate Risk of readmission after discharge from the hospital.    Hospital Discharge Diagnoses:  as above    Lace+ Score: 76  59-90 High Risk  29-58 Medium Risk  0-28   Low Risk.    TCM Follow-Up Recommendation:  LACE 29-58: Moderate Risk of readmission after discharge from the hospital.          Discharge Condition: alive    Important follow up  -PCP Prieto Novak MD see appointments listed below    -Labs: prn  -Radiology:  prn    Pain manageable with norco. Pt reports tramadol does not work for his pain- instructed him to bring it back to pharmacy to dispose and not to take with norco. He expressed understanding. Currently undergoing HD. No n/v/f/c  Vitals:    05/09/24 1059   BP: 133/75   Pulse: 80   Resp: 18   Temp: 97.6 °F (36.4 °C)       No acute distress, alert and oriented, no accessory m use  RR  Clear lungs, ok effort  Abdomen Benign  Moves all 4 extremities       Hospital Course:    43 year old male with history of asthma, ADD, bipolar 1, end-stage renal disease on dialysis, COPD, CHF, coronary disease, history of DVT and PE, depression, type 2 diabetes, hypertension, hyperlipidemia, migraines, neuropathy who presented after missing HD with severe BP elevation and continuation of abd pain.         Esrd on hd, missed hd  -nephrology on, appreciate- HD per nephrology     Uncontrolled htn  Intermittent hypotension  -  bp better with HD  - occ has to hold his bp meds when he runs low     Chronic abd pain, gi bleeding, recurrent  - has had extensive w/u with imaging and endoscopies.   -  has complaint of early satiety and feeling like food getting  harry- per review of previous notes, he had an EGD at [facility] with ?schatzki's ring. F/u outpatient  - he is to see gen surg for rectal bleeding/internal hemorrhoids, appt now moved to 5/15. May be also helpful to discuss with them if there could be scar tissue contributing from previous PD placement. C-scope 3/23 mentioned sig left sided extrinsic fixation, diverticula, polyps. The latter was also noted during subsequent c-scope 12/23.   - he has been on meds for bowel spasm including levsin and bentyl  - he reports that he also has almost a muscle spasm like sensation in LLQ. Seems to have better pain control on norco and flexeril. He still asks for iv dilaudid periodically because it works more quickly but understands he will not receive this on dc.   - If no further recs forthcoming from gen surg when he sees outpatient, he should f/u with his chronic pain specialist (he sees for cervical spine issues)     ADD  Bipolar  Depression  - cont home regimen     Asthma  COPD  -continue inhalers  Consults: IP CONSULT TO NEPHROLOGY  IP CONSULT TO HOSPITALIST  NURSING CONSULT TO DIETITIAN    Radiology:  CT ABDOMEN+PELVIS(CPT=74176)    Result Date: 5/5/2024  PROCEDURE:  CT ABDOMEN+PELVIS (CPT=74176)  COMPARISON:  [facility] , CT, CT ABDOMEN+PELVIS(CONTRAST ONLY)(CPT=74177), 4/22/2024, 10:41 PM.  INDICATIONS:  Abdominal pain and rectal bleeding. States that it has been ongoing. +N/V  TECHNIQUE:  Unenhanced multislice CT scanning was performed from the dome of the diaphragm to the pubic symphysis.  Dose reduction techniques were used. Dose information is transmitted to the ACR (American College of Radiology) NRDR (National Radiology Data Registry) which includes the Dose Index Registry.  PATIENT STATED HISTORY: (As transcribed by Technologist)  Abdominal pain and rectal bleeding    FINDINGS:  LUNG BASE:  Pleural parenchymal opacity in small effusion in the right lung base.  Linear and discoid atelectasis right lower lobe.  No  significant change.. LIVER:  Unremarkable BILIARY:  No biliary ductal dilatation. PANCREAS:  Unremarkable SPLEEN:  Normal caliber. KIDNEYS:  No hydronephrosis or cholelithiasis. ADRENALS:  Normal. AORTA/VASCULAR:  No aneurysm. RETROPERITONEUM:  No enlarged adenopathy. BOWEL/MESENTERY:  Normal caliber appendix. Uncomplicated colonic diverticulosis.  There is a large amount of stool in the colon.  Stomach is mildly distended with food. ABDOMINAL WALL:  No ventral wall hernia. PELVIC ORGANS:  Circumferential wall thickening of the urinary bladder.  Recommend clinical correlation to exclude cystitis.  BONES:  Degenerative disc disease L4-5 and L5-S1 levels disc space narrowing.  Mild degenerative changes in the lower lumbar facets.             CONCLUSION:  Normal caliber appendix.  Small right pleural effusion with pleural parenchymal scarring/atelectasis in the right lung base unchanged.  Circumferential wall thickening of the urinary bladder.  This may be seen with cystitis.  Clinical correlation recommended.  LOCATION:  [facility]   Dictated by (CST): Arlyn Oglesby MD on 5/05/2024 at 8:18 AM     Finalized by (CST): Arlyn Oglesby MD on 5/05/2024 at 8:22 AM       CT ABDOMEN+PELVIS(CONTRAST ONLY)(CPT=74177)    Result Date: 4/22/2024  PROCEDURE:  CT ABDOMEN+PELVIS (CONTRAST ONLY) (CPT=74177)  COMPARISON:  [facility], CT, CT ABDOMEN+PELVIS(CPT=74176), 4/02/2024, 3:50 PM.  INDICATIONS:  GIB  TECHNIQUE:  CT scanning was performed from the dome of the diaphragm to the pubic symphysis with non-ionic intravenous contrast material. Post contrast coronal MPR imaging was performed.  Dose reduction techniques were used. Dose information is transmitted to the ACR (American College of Radiology) NRDR (National Radiology Data Registry) which includes the Dose Index Registry.  PATIENT STATED HISTORY:(As transcribed by Technologist)  Patient reports abdominal pain with rectal bleeding.   CONTRAST USED:  100cc of Isovue 370  FINDINGS:   LIVER:  No enlargement, atrophy, abnormal density, or significant focal lesion.  BILIARY:  No visible dilatation or calcification.  PANCREAS:  No lesion, fluid collection, ductal dilatation, or atrophy.  SPLEEN:  No enlargement or focal lesion.  KIDNEYS:  No mass, obstruction, or calcification.  ADRENALS:  No mass or enlargement.  AORTA/VASCULAR:  No aneurysm or dissection.  RETROPERITONEUM:  No mass or adenopathy.  BOWEL/MESENTERY:  No visible mass, obstruction, or bowel wall thickening.  Pancolonic diverticulosis without evidence of acute diverticulitis.  Normal appendix.  ABDOMINAL WALL:  No mass or hernia.  URINARY BLADDER:  Bladder wall likely accentuated by under distention. PELVIC NODES:  No adenopathy.  PELVIC ORGANS:  No visible mass.  Pelvic organs appropriate for patient age.  BONES:  No bony lesion or fracture.  Moderate disc and endplate degenerative change of the lumbosacral junction.  LUNG BASES:  Trace right pleural effusion. OTHER:  Negative.             CONCLUSION:   1. No acute process of the abdomen or pelvis.  2. Chronic small right pleural effusion.   LOCATION:  [facility]   Dictated by (CST): Claude Singh MD on 4/22/2024 at 11:23 PM     Finalized by (CST): Claude Singh MD on 4/22/2024 at 11:29 PM         Operative Procedures:      Disposition: alive    Patient Instructions: Current and discharge medications reviewed with patient  Current Discharge Medication List        START taking these medications    Details   hyoscyamine sulfate 0.125 MG Oral Tablet Dispersible Place 1 tablet (0.125 mg total) under the tongue in the morning, at noon, and at bedtime.      !! docusate sodium 100 MG Oral Cap Take 1 capsule (100 mg total) by mouth 2 (two) times daily as needed for constipation.      HYDROcodone-acetaminophen 5-325 MG Oral Tab Take 2 tablets by mouth every 4 (four) hours as needed.       !! - Potential duplicate medications found. Please discuss with provider.        CONTINUE these medications which  have NOT CHANGED    Details   cloNIDine 0.1 MG Oral Tab Take 1 tablet (0.1 mg total) by mouth daily with food.      ARIPiprazole 5 MG Oral Tab Take 1 tablet (5 mg total) by mouth daily.      !! docusate sodium 100 MG Oral Cap Take 1 capsule (100 mg total) by mouth 2 (two) times daily as needed for constipation.      dicyclomine 10 MG Oral Cap Take 1 capsule (10 mg total) by mouth 3 (three) times daily as needed.      isosorbide mononitrate ER 30 MG Oral Tablet 24 Hr Take 1 tablet (30 mg total) by mouth daily. Hold if systolic blood pressure <130      Lisdexamfetamine Dimesylate 60 MG Oral Cap Take 1 capsule (60 mg total) by mouth every morning.      buPROPion  MG Oral Tablet 24 Hr Take 1 tablet (150 mg total) by mouth daily.      bisacodyl 5 MG Oral Tab EC Take 3 tablets (15 mg total) by mouth daily.      polyethylene glycol, PEG 3350, 17 g Oral Powd Pack Take 17 g by mouth in the morning and 17 g before bedtime.      lamoTRIgine (LAMICTAL) 150 MG Oral Tab Take 1 tablet (150 mg total) by mouth daily.      FLUoxetine HCl 40 MG Oral Cap Take 1 capsule (40 mg total) by mouth daily.      RENVELA 800 MG Oral Tab Take 1 tablet (800 mg total) by mouth 3 (three) times daily with meals.      losartan 100 MG Oral Tab Take 1 tablet (100 mg total) by mouth daily. Hold if systolic blood pressure <130      hydrALAZINE 50 MG Oral Tab Take 1 tablet (50 mg total) by mouth in the morning and 1 tablet (50 mg total) before bedtime. Hold if systolic blood pressure <130.      NIFEdipine ER 60 MG Oral Tablet 24 Hr Take 1 tablet (60 mg total) by mouth in the morning and 1 tablet (60 mg total) before bedtime. Hold if systolic blood pressure <130.      carvedilol 25 MG Oral Tab Take 1 tablet (25 mg total) by mouth in the morning and 1 tablet (25 mg total) in the evening. Take with meals.      Tiotropium Bromide Monohydrate (SPIRIVA HANDIHALER) 18 MCG Inhalation Cap 1 capsule (18 mcg total) daily.      albuterol 108 (90 Base) MCG/ACT  Inhalation Aero Soln albuterol sulfate HFA 90 mcg/actuation aerosol inhaler   INHALE 1 TO 2 PUFFS BY MOUTH EVERY 4-6 HOURS AS NEEDED FOR COUGH OR WHEEZING      Fenofibrate 134 MG Oral Cap Take 1 capsule by mouth nightly.      Fluticasone Propionate 50 MCG/ACT Nasal Suspension SPRAY ONCE INTO EACH NOSTRIL BID PRN       !! - Potential duplicate medications found. Please discuss with provider.          Home Medication Changes:     Activity: as directed  Diet: as directed  Wound Care:  prn  Code Status: Full Code  O2: prn    Follow-up with       Call Prieto Novak  Specialty: Internal Medicine  coordinator to call to schedule appt [facility]  [address]  [address]  [facility] [address]  [phone]           Total Time Coordinating Care: 35 minutes    Patient had opportunity to ask questions and state understand and agree with therapeutic plan as outlined    Thank You,  Velma Goodman MD    [facility] Hospitalist  Internal Medicine  Answering Service number: [phone]

## 2024-05-09 NOTE — DISCHARGE PLANNING
IV removed with no difficulties   Went over AVS in detail   Printed scripts provided to pt   Pt to follow up with primary care MD   Nephro/hospitalist have signed off     No future appointments.

## 2024-05-09 NOTE — PROGRESS NOTES
[facility]   part of [facility]    Nephrology Progress Note    Godwin Fonseca Attending:  Laila Ryder MD     Cc: ESRD    SUBJECTIVE     Seen and examined on HD, tolerating well. No complaints    PHYSICAL EXAM   Vital signs: /75 (BP Location: Right arm)   Pulse 80   Temp 97.6 °F (36.4 °C) (Oral)   Resp 18   Ht 6' 2\" (1.88 m)   Wt 250 lb (113.4 kg)   SpO2 100%   BMI 32.10 kg/m²   Temp (24hrs), Av.8 °F (36.6 °C), Min:97.6 °F (36.4 °C), Max:98.1 °F (36.7 °C)       Intake/Output Summary (Last 24 hours) at 2024 1831  Last data filed at 2024 1059  Gross per 24 hour   Intake 972 ml   Output 325 ml   Net 647 ml     Wt Readings from Last 3 Encounters:   24 250 lb (113.4 kg)   24 269 lb (122 kg)   24 240 lb (108.9 kg)     General: NAD  HEENT: NCAT, EOMI, MMM  Neck: Supple   Cardiac: Regular rate and rhythm   Lungs: CTAB  Abdomen: Soft, non-tender, nondistended   Extremities: No edema  Neurologic: No asterxis  Skin: Warm and dry, no rashes     MEDS     Current Facility-Administered Medications   Medication Dose Route Frequency    HYDROcodone-acetaminophen (Norco) 5-325 MG per tab 1 tablet  1 tablet Oral Q4H PRN    Or    HYDROcodone-acetaminophen (Norco) 5-325 MG per tab 2 tablet  2 tablet Oral Q4H PRN    cyclobenzaprine (Flexeril) tab 5 mg  5 mg Oral TID    hyoscyamine sulfate (LEVSIN) disintegrating tab 0.125 mg  0.125 mg Sublingual TID    dicyclomine (Bentyl) cap 10 mg  10 mg Oral QID    HYDROmorphone (Dilaudid) 1 MG/ML injection 0.4 mg  0.4 mg Intravenous Q2H PRN    Or    HYDROmorphone (Dilaudid) 1 MG/ML injection 0.8 mg  0.8 mg Intravenous Q2H PRN    Or    HYDROmorphone (Dilaudid) 1 MG/ML injection 1.2 mg  1.2 mg Intravenous Q2H PRN    hydrALAzine (Apresoline) 20 mg/mL injection 10 mg  10 mg Intravenous Q4H PRN    labetalol (Trandate) 5 mg/mL injection 20 mg  20 mg Intravenous Q4H PRN    acetaminophen (Tylenol Extra Strength) tab 500 mg  500 mg Oral Q4H PRN    sennosides  (Senokot) tab 17.2 mg  17.2 mg Oral Nightly PRN    bisacodyl (Dulcolax) 10 MG rectal suppository 10 mg  10 mg Rectal Daily PRN    ondansetron (Zofran) 4 MG/2ML injection 4 mg  4 mg Intravenous Q6H PRN    prochlorperazine (Compazine) 10 MG/2ML injection 5 mg  5 mg Intravenous Q8H PRN    heparin (Porcine) 5000 UNIT/ML injection 5,000 Units  5,000 Units Subcutaneous Q8H MARTHA    bisacodyl (Dulcolax) DR tab 15 mg  15 mg Oral Daily    carvedilol (Coreg) tab 25 mg  25 mg Oral BID with meals    cloNIDine (Catapres) tab 0.1 mg  0.1 mg Oral Daily    docusate sodium (Colace) cap 100 mg  100 mg Oral BID PRN    FLUoxetine (PROzac) cap 40 mg  40 mg Oral Daily    fluticasone propionate (Flonase) 50 MCG/ACT nasal suspension 1 spray  1 spray Each Nare BID    lamoTRIgine (LaMICtal) tab 150 mg  150 mg Oral Daily    polyethylene glycol (PEG 3350) (Miralax) 17 g oral packet 17 g  17 g Oral BID    albuterol (Ventolin HFA) 108 (90 Base) MCG/ACT inhaler 2 puff  2 puff Inhalation Q4H PRN    buPROPion ER (Wellbutrin XL) 24 hr tab 150 mg  150 mg Oral Daily    hydrALAZINE (Apresoline) tab 50 mg  50 mg Oral BID    isosorbide mononitrate ER (Imdur) 24 hr tab 30 mg  30 mg Oral Daily    amphetamine-dextroamphetamine (Adderall) tab 10 mg  10 mg Oral BID AC    losartan (Cozaar) tab 100 mg  100 mg Oral Daily    NIFEdipine ER (Procardia-XL) 24 hr tab 60 mg  60 mg Oral BID    sevelamer carbonate (Renvela) tab 800 mg  800 mg Oral TID CC    umeclidinium bromide (Incruse Ellipta) 62.5 MCG/ACT inhaler 1 puff  1 puff Inhalation Daily    glucose (Dex4) 15 GM/59ML oral liquid 15 g  15 g Oral Q15 Min PRN    Or    glucose (Glutose) 40% oral gel 15 g  15 g Oral Q15 Min PRN    Or    glucose-vitamin C (Dex-4) chewable tab 4 tablet  4 tablet Oral Q15 Min PRN    Or    dextrose 50% injection 50 mL  50 mL Intravenous Q15 Min PRN    Or    glucose (Dex4) 15 GM/59ML oral liquid 30 g  30 g Oral Q15 Min PRN    Or    glucose (Glutose) 40% oral gel 30 g  30 g Oral Q15 Min PRN     Or    glucose-vitamin C (Dex-4) chewable tab 8 tablet  8 tablet Oral Q15 Min PRN    insulin aspart (NovoLOG) 100 Units/mL FlexPen 1-5 Units  1-5 Units Subcutaneous TID AC and HS    heparin (Porcine) 1000 UNIT/ML injection 5,000 Units  5,000 Units Intravenous PRN Dialysis    diphenhydrAMINE (Benadryl) 50 mg/mL  injection 12.5 mg  12.5 mg Intravenous Q4H PRN    Or    diphenhydrAMINE (Benadryl) cap/tab 25 mg  25 mg Oral Q4H PRN    ARIPiprazole (Abilify) tab 5 mg  5 mg Oral Daily       LABS     Lab Results   Component Value Date    WBC 5.3 05/09/2024    HGB 10.2 05/09/2024    HCT 29.8 05/09/2024    .0 05/09/2024    CREATSERUM 9.58 05/09/2024    BUN 50 05/09/2024     05/09/2024    K 4.2 05/09/2024     05/09/2024    CO2 23.0 05/09/2024    GLU 87 05/09/2024    CA 8.6 05/09/2024    ALB 3.6 05/09/2024    MG 2.4 05/09/2024    PHOS 6.6 05/09/2024    PGLU 92 05/09/2024       IMAGING   All imaging studies personally reviewed.    CT ABDOMEN+PELVIS(CPT=74176)   Final Result   PROCEDURE:  CT ABDOMEN+PELVIS (CPT=74176)       COMPARISON:  [facility] , CT, CT ABDOMEN+PELVIS(CONTRAST ONLY)(CPT=74177),    4/22/2024, 10:41 PM.       INDICATIONS:  Abdominal pain and rectal bleeding. States that it has been    ongoing. +N/V       TECHNIQUE:  Unenhanced multislice CT scanning was performed from the dome    of the diaphragm to the pubic symphysis.  Dose reduction techniques were    used. Dose information is transmitted to the ACR (American College of    Radiology) NRDR (National Radiology    Data Registry) which includes the Dose Index Registry.       PATIENT STATED HISTORY: (As transcribed by Technologist)  Abdominal pain    and rectal bleeding            FINDINGS:     LUNG BASE:  Pleural parenchymal opacity in small effusion in the right    lung base.  Linear and discoid atelectasis right lower lobe.  No    significant change..   LIVER:  Unremarkable   BILIARY:  No biliary ductal dilatation.   PANCREAS:  Unremarkable     SPLEEN:  Normal caliber.   KIDNEYS:  No hydronephrosis or cholelithiasis.   ADRENALS:  Normal.   AORTA/VASCULAR:  No aneurysm.   RETROPERITONEUM:  No enlarged adenopathy.   BOWEL/MESENTERY:  Normal caliber appendix. Uncomplicated colonic    diverticulosis.  There is a large amount of stool in the colon.  Stomach    is mildly distended with food.   ABDOMINAL WALL:  No ventral wall hernia.   PELVIC ORGANS:  Circumferential wall thickening of the urinary bladder.     Recommend clinical correlation to exclude cystitis.     BONES:  Degenerative disc disease L4-5 and L5-S1 levels disc space    narrowing.  Mild degenerative changes in the lower lumbar facets.                          =====   CONCLUSION:  Normal caliber appendix.       Small right pleural effusion with pleural parenchymal scarring/atelectasis    in the right lung base unchanged.       Circumferential wall thickening of the urinary bladder.  This may be seen    with cystitis.  Clinical correlation recommended.       LOCATION:  [facility]           Dictated by (CST): Arlyn Oglesby MD on 5/05/2024 at 8:18 AM        Finalized by (CST): Arlyn Oglesby MD on 5/05/2024 at 8:22 AM               ASSESSMENT & PLAN   44 year old male with PMH of Asthma, ADD, CHF COPD, CAD, DVT, PE, DM2, HTN, HL, ESRD on HD TTS here with abdominal pain were consulted to resume iHD while inhouse:     ESRD on HD:  -- TTS schedule last HD on Thursday unable to go to HD because of abdominal pain.  -- sp HD Sunday. UF 3L per pt. Notes usual UF 4-5L/ However with vomiting and diarrhea will plan for less UF again tomorrow  -- getting HD today, seen and examined on HD , 3K, UF less given hypotension/cramping, as tolerated. HD directly managed   -- avoid nephrotoxins and renally adjust medications     Anemia in ESRD:  -- epo if hgb < 10      Hyperphosphatemia/CKD-MBD:  -- resume renvela 800mg PO TID with meals.  -- check phos daily.     HTN:  -- improved sp HD. Continue home meds w hold  parameters to hold if < 130 given low BPs, likely due to illness/etc   -- hold BP meds morning of HD      Abdominal pain:  -- per primary     D./w dialysis RN    Thank you for allowing me to participate in the care of this patient. Please do not hesitate to call with questions or concerns.       Samaria Nava MD  [facility] Nephrology

## 2024-05-13 ENCOUNTER — HOSPITAL ENCOUNTER (EMERGENCY)
Age: 44
Discharge: HOME | DRG: 193 | End: 2024-05-13
Attending: EMERGENCY MEDICINE

## 2024-05-13 ENCOUNTER — OFFICE VISIT (OUTPATIENT)
Dept: FAMILY MEDICINE CLINIC | Facility: CLINIC | Age: 44
End: 2024-05-13
Payer: COMMERCIAL

## 2024-05-13 VITALS
DIASTOLIC BLOOD PRESSURE: 124 MMHG | RESPIRATION RATE: 22 BRPM | OXYGEN SATURATION: 96 % | SYSTOLIC BLOOD PRESSURE: 174 MMHG | BODY MASS INDEX: 30.54 KG/M2 | HEIGHT: 74 IN | HEART RATE: 96 BPM | WEIGHT: 238 LBS | TEMPERATURE: 98 F

## 2024-05-13 VITALS
WEIGHT: 250 LBS | SYSTOLIC BLOOD PRESSURE: 180 MMHG | HEART RATE: 103 BPM | TEMPERATURE: 98 F | DIASTOLIC BLOOD PRESSURE: 110 MMHG | RESPIRATION RATE: 20 BRPM | BODY MASS INDEX: 32.08 KG/M2 | OXYGEN SATURATION: 97 % | HEIGHT: 74 IN

## 2024-05-13 DIAGNOSIS — N18.6 ESRD (END STAGE RENAL DISEASE) ON DIALYSIS (HCC): ICD-10-CM

## 2024-05-13 DIAGNOSIS — J02.9 SORE THROAT: Primary | ICD-10-CM

## 2024-05-13 DIAGNOSIS — Z99.2 ESRD (END STAGE RENAL DISEASE) ON DIALYSIS (HCC): ICD-10-CM

## 2024-05-13 DIAGNOSIS — R03.0 ELEVATED BLOOD PRESSURE READING: ICD-10-CM

## 2024-05-13 DIAGNOSIS — B34.9 VIRAL SYNDROME: Primary | ICD-10-CM

## 2024-05-13 DIAGNOSIS — R13.10 DYSPHAGIA, UNSPECIFIED TYPE: ICD-10-CM

## 2024-05-13 LAB
CONTROL LINE PRESENT WITH A CLEAR BACKGROUND (YES/NO): YES YES/NO
KIT LOT #: NORMAL NUMERIC
STREP GRP A CUL-SCR: NEGATIVE

## 2024-05-13 PROCEDURE — 99282 EMERGENCY DEPT VISIT SF MDM: CPT

## 2024-05-13 PROCEDURE — 99283 EMERGENCY DEPT VISIT LOW MDM: CPT

## 2024-05-13 NOTE — PROGRESS NOTES
CHIEF COMPLAINT:     Chief Complaint   Patient presents with    Sore Throat     Left side of neck pain, left ear pain, more tired and left side of throat pain s/s for 3 days. No otc meds taken.          HPI:   Godwin Fonseca is a 44 year old male with ESR  presents to clinic with c/o 3 day history of Left sided HA/facial pain, sore throat, and L ear pain accompanied with dysphagia and muffled voice.  Progressively worsening over course . Reports unable to swallow food last night and having difficulty tolerating own secretions.  (+) chills/sweats overnight, clear rhinorrhea.   Denies cough, no fever, no SOB/RAMSAY, no lh/dizziness, no rash.     Admitted from 5/5/24 though 5/9/24 for missed HD/abd pain.     Pt reports he is holding his antiHTN medications until after dialysis, scheduled for 1 pm today.     Current Outpatient Medications   Medication Sig Dispense Refill    cloNIDine 0.1 MG Oral Tab Take 1 tablet (0.1 mg total) by mouth daily with food.      hyoscyamine sulfate 0.125 MG Oral Tablet Dispersible Place 1 tablet (0.125 mg total) under the tongue in the morning, at noon, and at bedtime. 30 tablet 0    docusate sodium 100 MG Oral Cap Take 1 capsule (100 mg total) by mouth 2 (two) times daily as needed for constipation. 30 capsule 0    HYDROcodone-acetaminophen 5-325 MG Oral Tab Take 2 tablets by mouth every 4 (four) hours as needed. 20 tablet 0    ARIPiprazole 5 MG Oral Tab Take 1 tablet (5 mg total) by mouth daily.      docusate sodium 100 MG Oral Cap Take 1 capsule (100 mg total) by mouth 2 (two) times daily as needed for constipation. 60 capsule 0    dicyclomine 10 MG Oral Cap Take 1 capsule (10 mg total) by mouth 3 (three) times daily as needed. 20 capsule 0    isosorbide mononitrate ER 30 MG Oral Tablet 24 Hr Take 1 tablet (30 mg total) by mouth daily. Hold if systolic blood pressure <130      Lisdexamfetamine Dimesylate 60 MG Oral Cap Take 1 capsule (60 mg total) by mouth every morning.      buPROPion ER  150 MG Oral Tablet 24 Hr Take 1 tablet (150 mg total) by mouth daily.      bisacodyl 5 MG Oral Tab EC Take 3 tablets (15 mg total) by mouth daily. 30 tablet 0    polyethylene glycol, PEG 3350, 17 g Oral Powd Pack Take 17 g by mouth in the morning and 17 g before bedtime. 60 packet 0    lamoTRIgine (LAMICTAL) 150 MG Oral Tab Take 1 tablet (150 mg total) by mouth daily. 7 tablet 0    FLUoxetine HCl 40 MG Oral Cap Take 1 capsule (40 mg total) by mouth daily. 7 capsule 0    RENVELA 800 MG Oral Tab Take 1 tablet (800 mg total) by mouth 3 (three) times daily with meals.      losartan 100 MG Oral Tab Take 1 tablet (100 mg total) by mouth daily. Hold if systolic blood pressure <130      hydrALAZINE 50 MG Oral Tab Take 1 tablet (50 mg total) by mouth in the morning and 1 tablet (50 mg total) before bedtime. Hold if systolic blood pressure <130. 30 tablet 0    NIFEdipine ER 60 MG Oral Tablet 24 Hr Take 1 tablet (60 mg total) by mouth in the morning and 1 tablet (60 mg total) before bedtime. Hold if systolic blood pressure <130.      carvedilol 25 MG Oral Tab Take 1 tablet (25 mg total) by mouth in the morning and 1 tablet (25 mg total) in the evening. Take with meals. 60 tablet 6    Tiotropium Bromide Monohydrate (SPIRIVA HANDIHALER) 18 MCG Inhalation Cap 1 capsule (18 mcg total) daily.      albuterol 108 (90 Base) MCG/ACT Inhalation Aero Soln albuterol sulfate HFA 90 mcg/actuation aerosol inhaler   INHALE 1 TO 2 PUFFS BY MOUTH EVERY 4-6 HOURS AS NEEDED FOR COUGH OR WHEEZING      Fenofibrate 134 MG Oral Cap Take 1 capsule by mouth nightly. 90 capsule 1    Fluticasone Propionate 50 MCG/ACT Nasal Suspension SPRAY ONCE INTO EACH NOSTRIL BID PRN 15.8 mL 0      Past Medical History:    Asthma (HCC)    Attention deficit hyperactivity disorder (ADHD)    Back problem    Bipolar 1 disorder (HCC)    Cancer (HCC)    CKD (chronic kidney disease) stage 3, GFR 30-59 ml/min ([facility])    Dr Meeks    Congestive heart disease ([facility])    COPD  (chronic obstructive pulmonary disease) (HCC)    Coronary atherosclerosis    Deep vein thrombosis (HCC)    at age 17 R/T cast    Depression    Diabetes (HCC)    Essential hypertension    3/21 echo: severe concentric LVH with normal EF and no MR or pHTN    Extrinsic asthma, unspecified    Heart attack (HCC)    2016- angiogram- no intervention    Heart valve disease    mitral valve repair in /    High blood pressure    High cholesterol    History of mitral valve repair    Hyperlipidemia    Low back pain    tight and stiff after sweeping and mopping    LVH (left ventricular hypertrophy)    Migraines    Mixed hyperlipidemia     HDL 38 LDL 97 VLDL 57     Monoclonal gammopathy    IgG kappa     MVP (mitral valve prolapse)    Repair  at [place]; echoes as recently as 3/21 show mild or trivial MR and no stenosis    Neuropathy    Osteoarthritis    hip ,knees    Pneumonia due to organism    Pulmonary embolism (HCC)    Renal disorder    TIA (transient ischemic attack)    Initial history of left-sided weakness and slurred speech. (+) cocaine. MRI of the brain, CT angiogram of the head and neck, and 2D echo are all unremarkable.     TMJ (dislocation of temporomandibular joint)    Troponin level elevated    Trop 60 60 47 with TIA and no CP: Lexiscan negative with EF 51      Social History:  Social History     Socioeconomic History    Marital status:    Tobacco Use    Smoking status: Former     Current packs/day: 0.00     Average packs/day: 1 pack/day for 27.0 years (27.0 ttl pk-yrs)     Types: Cigarettes     Start date: 1995     Quit date: 2022     Years since quittin.2    Smokeless tobacco: Never   Vaping Use    Vaping status: Never Used   Substance and Sexual Activity    Alcohol use: No    Drug use: No     Social Determinants of Health     Financial Resource Strain: Medium Risk (2024)    Received from [facility]    Overall Financial Resource Strain (CARDIA)     Difficulty of  Paying Living Expenses: Somewhat hard   Food Insecurity: No Food Insecurity (5/7/2024)    Received from [facility]    Hunger Vital Sign     Worried About Running Out of Food in the Last Year: Never true     Ran Out of Food in the Last Year: Never true   Transportation Needs: No Transportation Needs (5/7/2024)    Received from [facility]    PRAPARE - Transportation     Lack of Transportation (Medical): No     Lack of Transportation (Non-Medical): No   Physical Activity: Inactive (5/7/2024)    Received from [facility]    Exercise Vital Sign     Days of Exercise per Week: 0 days     Minutes of Exercise per Session: 0 min   Stress: Stress Concern Present (5/7/2024)    Received from [facility]    [place] [employer] - Occupational Stress Questionnaire     Feeling of Stress : Rather much   Social Connections: Socially Isolated (5/7/2024)    Received from [facility]    Social Connection and Isolation Panel [NHANES]     Frequency of Communication with Friends and Family: More than three times a week     Frequency of Social Gatherings with Friends and Family: More than three times a week     Attends Faith Services: Never     Active Member of Clubs or Organizations: No     Attends Club or Organization Meetings: Never     Marital Status: Patient unable to answer   Housing Stability: Low Risk  (5/7/2024)    Received from [facility]    Housing Stability Vital Sign     Unable to Pay for Housing in the Last Year: No     Number of Places Lived in the Last Year: 1     In the last 12 months, was there a time when you did not have a steady place to sleep or slept in a shelter (including now)?: No        REVIEW OF SYSTEMS:   GENERAL HEALTH: decreased appetite  SKIN: Per HPI  HEENT: denies ear pain, See HPI  RESPIRATORY: denies shortness of breath or wheezing  CARDIOVASCULAR: denies chest pain or palpitations   GI: denies vomiting or diarrhea  NEURO:  denies dizziness or lightheadedness    EXAM:   BP (!) 180/110   Pulse 103   Temp 97.8 °F (36.6 °C) (Oral)   Resp 20   Ht 6' 2\" (1.88 m)   Wt 250 lb (113.4 kg)   SpO2 97%   BMI 32.10 kg/m²   GENERAL: well developed, well nourished,in no apparent distress  SKIN: no rashes,no suspicious lesions  HEAD: atraumatic, normocephalic  EYES: conjunctiva clear, EOM intact  EARS: TM's clear, non-injected, no bulging, retraction, or fluid bilaterally  NOSE: nostrils patent, clear nasal discharge, nasal mucosa pink/moist  THROAT: oral mucosa pink, moist.  Pt reports difficulty opening mouth due to pain, post op pink without visible erythema/edema or exudates, uvula midline, denies dental/gingival ttp.   NECK: supple. Marked ttp along left side of neck with slight fullness, no palpable masses.   No stridor, trachea midline.   LUNGS:Garbled/muffled speech initially, pt reports difficulty speaking due to throat pain/discomfort, Prolonged speaking with noted improvement in quality of voice.  Lungs CTA, no w/r/r, normal effort. No retractions or accessory muscle use.   CARDIO: RRR without murmur    Recent Results (from the past 24 hour(s))   Rapid Strep    Collection Time: 05/13/24 11:20 AM   Result Value Ref Range    Strep Grp A Screen Negative Negative    Control Line Present with a clear background (yes/no) Yes Yes/No    Kit Lot # [id number] Numeric    Kit Expiration Date 5/21/2025 Date         ASSESSMENT AND PLAN:   Assessment:   Encounter Diagnoses   Name Primary?    Sore throat Yes    Dysphagia, unspecified type     Elevated blood pressure reading     ESRD (end stage renal disease) on dialysis (HCC)        Plan  Pt reports dysphagia with c/o muffled voice with neck ttp/swelling.  Reports unable to swallow solids and tolerate own secretions, exam unremarkable, rapid strep negative.  Given above c/c and limitations of WIC, pt referred to ED for further work up beyond scope of WIC.  Pt v/u and agreement with plan of care,  declines EMS transport, AMA form signed.     Angie Armstrong PA-C

## 2024-05-13 NOTE — ED PROVIDER NOTES
Patient Seen in: [facility] [place]      History     Chief Complaint   Patient presents with    Sore Throat     Stated Complaint: swelling to lt side of throat sent from IC to r/o peritonsilar abscess    Subjective:     HPI    44-year-old male with renal failure and on hemodialysis (Tuesday/Thursday/Saturday) who comes in with 2 days of sore throat.  He went to a walk-in clinic earlier today and was tested for flu and COVID, both of which are negative.  He was advised to go to the emergency department for possible peritonsillar abscess.  Patient has myalgias, chills, and generalized weakness.  No fever.  For chills.  To use Tylenol/ibuprofen    Objective:   Past Medical History:    Asthma ([facility])    Attention deficit hyperactivity disorder (ADHD)    Back problem    Bipolar 1 disorder (HCC)    Cancer (HCC)    CKD (chronic kidney disease) stage 3, GFR 30-59 ml/min ([facility])    Dr Meeks    Congestive heart disease ([facility])    COPD (chronic obstructive pulmonary disease) ([facility])    Coronary atherosclerosis    Deep vein thrombosis ([facility])    at age 17 R/T cast    Depression    Diabetes ([facility])    Essential hypertension    3/21 echo: severe concentric LVH with normal EF and no MR or pHTN    Extrinsic asthma, unspecified    Heart attack ([facility])    2016- angiogram- no intervention    Heart valve disease    mitral valve repair in 1994/    High blood pressure    High cholesterol    History of mitral valve repair    Hyperlipidemia    Low back pain    tight and stiff after sweeping and mopping    LVH (left ventricular hypertrophy)    Migraines    Mixed hyperlipidemia     HDL 38 LDL 97 VLDL 57     Monoclonal gammopathy    IgG kappa     MVP (mitral valve prolapse)    Repair 1994 at [place]; echoes as recently as 3/21 show mild or trivial MR and no stenosis    Neuropathy    Osteoarthritis    hip ,knees    Pneumonia due to organism    Pulmonary embolism (HCC)    Renal disorder    TIA (transient ischemic attack)     Initial history of left-sided weakness and slurred speech. (+) cocaine. MRI of the brain, CT angiogram of the head and neck, and 2D echo are all unremarkable.     TMJ (dislocation of temporomandibular joint)    Troponin level elevated    Trop 60 60 47 with TIA and no CP: Lexiscan negative with EF 51              Past Surgical History:   Procedure Laterality Date    Colonoscopy N/A 2023    Procedure: COLONOSCOPY;  Surgeon: Heath Vu MD;  Location:  ENDOSCOPY    Colonoscopy N/A 2023    Procedure: COLONOSCOPY with cold snare polypectomy and forcep polypectomy;  Surgeon: Ousmane Suarez MD;  Location:  ENDOSCOPY    Colonoscopy & polypectomy  2019    Egd  2019    Duodenitis. Biopsied. EUS for weight loss was negative    Heart surgery      Hernia surgery  2022    Dr Barnes    Laminectomy,>2 sgmt,lumbar  2018    L4-L5 Decomp Discectomy ROEM L4-L5    Mitralplasty w cp bypass      [place]: Repair    Repair rotator cuff,chronic Left     torn and had a ruptured bicep    Valve repair      mitral valve                Social History     Socioeconomic History    Marital status:    Tobacco Use    Smoking status: Former     Current packs/day: 0.00     Average packs/day: 1 pack/day for 27.0 years (27.0 ttl pk-yrs)     Types: Cigarettes     Start date: 1995     Quit date: 2022     Years since quittin.2    Smokeless tobacco: Never   Vaping Use    Vaping status: Never Used   Substance and Sexual Activity    Alcohol use: No    Drug use: No     Social Determinants of Health     Financial Resource Strain: Medium Risk (2024)    Received from [facility]    Overall Financial Resource Strain (CARDIA)     Difficulty of Paying Living Expenses: Somewhat hard   Food Insecurity: No Food Insecurity (2024)    Received from [facility]    Hunger Vital Sign     Worried About Running Out of Food in the Last Year: Never true     Ran Out of Food in the Last Year:  Never true   Transportation Needs: No Transportation Needs (5/7/2024)    Received from [facility]    PRAPARE - Transportation     Lack of Transportation (Medical): No     Lack of Transportation (Non-Medical): No   Physical Activity: Inactive (5/7/2024)    Received from [facility]    Exercise Vital Sign     Days of Exercise per Week: 0 days     Minutes of Exercise per Session: 0 min   Stress: Stress Concern Present (5/7/2024)    Received from [facility]    [employer] [place] [employer] - Occupational Stress Questionnaire     Feeling of Stress : Rather much   Social Connections: Socially Isolated (5/7/2024)    Received from [facility]    Social Connection and Isolation Panel [NHANES]     Frequency of Communication with Friends and Family: More than three times a week     Frequency of Social Gatherings with Friends and Family: More than three times a week     Attends Cheondoism Services: Never     Active Member of Clubs or Organizations: No     Attends Club or Organization Meetings: Never     Marital Status: Patient unable to answer   Housing Stability: Low Risk  (5/7/2024)    Received from [facility]    Housing Stability Vital Sign     Unable to Pay for Housing in the Last Year: No     Number of Places Lived in the Last Year: 1     In the last 12 months, was there a time when you did not have a steady place to sleep or slept in a shelter (including now)?: No              Review of Systems    Positive for stated complaint: swelling to lt side of throat sent from IC to r/o peritonsilar abscess  Other systems are as noted in HPI.  Constitutional and vital signs reviewed.      All other systems reviewed and negative except as noted above.    Physical Exam     ED Triage Vitals [05/13/24 1220]   BP (!) 188/128   Pulse 101   Resp 22   Temp 98.4 °F (36.9 °C)   Temp src Oral   SpO2 96 %   O2 Device None (Room air)       Current:BP (!) 174/124   Pulse 96   Temp 98.4  °F (36.9 °C) (Oral)   Resp 22   Ht 188 cm (6' 2\")   Wt 108 kg   SpO2 96%   BMI 30.56 kg/m²         General:  Vitals as listed.  No acute distress   HEENT: Sclerae anicteric.  Conjunctivae show no pallor.  Oropharynx clearwithout exudate.  tonsillar asymmetry.  Palpation reveals no fluctuant mass in the peritonsillar area.  Mucous membranes dry No  Lungs: good air exchange  Extremities: no edema, normal peripheral pulses   Neuro: Alert oriented and nonfocal    ED COURSE and MDM     Patient has viral syndrome.  To use Tylenol/ibuprofen for discomfort.  Drink plenty of fluids.  =    I have discussed with the patient the results of testing, differential diagnosis, and treatment plan. They expressed clear understanding of these instructions and agrees to the plan provided.    Disposition and Plan     Clinical Impression:  1. Viral syndrome         Disposition:  Discharge  5/13/2024  1:36 pm    Follow-up:  Prieto Novak MD  [id number]  [address]  [facility] [id number]  [phone]    Schedule an appointment as soon as possible for a visit in 3 day(s)  As needed        Medications Prescribed:  Current Discharge Medication List

## 2024-05-13 NOTE — ED INITIAL ASSESSMENT (HPI)
Pt reports sore throat x 2 days worse on lt side with body aches, chills, neck and ear pain. Pt sent from walk in clinic for possible tonsillar abscess.

## 2024-05-14 ENCOUNTER — APPOINTMENT (OUTPATIENT)
Dept: GENERAL RADIOLOGY | Age: 44
DRG: 193 | End: 2024-05-14
Attending: EMERGENCY MEDICINE

## 2024-05-14 ENCOUNTER — APPOINTMENT (OUTPATIENT)
Dept: CT IMAGING | Age: 44
DRG: 193 | End: 2024-05-14
Attending: EMERGENCY MEDICINE

## 2024-05-14 ENCOUNTER — HOSPITAL ENCOUNTER (INPATIENT)
Facility: HOSPITAL | Age: 44
LOS: 3 days | Discharge: HOME | DRG: 193 | End: 2024-05-17
Attending: EMERGENCY MEDICINE | Admitting: INTERNAL MEDICINE

## 2024-05-14 DIAGNOSIS — N17.1 ACUTE RENAL FAILURE WITH ACUTE RENAL CORTICAL NECROSIS SUPERIMPOSED ON STAGE 4 CHRONIC KIDNEY DISEASE (HCC): ICD-10-CM

## 2024-05-14 DIAGNOSIS — N18.4 ACUTE RENAL FAILURE WITH ACUTE RENAL CORTICAL NECROSIS SUPERIMPOSED ON STAGE 4 CHRONIC KIDNEY DISEASE (HCC): ICD-10-CM

## 2024-05-14 DIAGNOSIS — J18.9 COMMUNITY ACQUIRED PNEUMONIA OF RIGHT LOWER LOBE OF LUNG: Primary | ICD-10-CM

## 2024-05-14 LAB
ALBUMIN SERPL-MCNC: 3.3 G/DL (ref 3.4–5)
ALBUMIN/GLOB SERPL: 0.8 {RATIO} (ref 1–2)
ALP LIVER SERPL-CCNC: 99 U/L
ALT SERPL-CCNC: 18 U/L
ANION GAP SERPL CALC-SCNC: 8 MMOL/L (ref 0–18)
AST SERPL-CCNC: 31 U/L (ref 15–37)
BASOPHILS # BLD AUTO: 0.07 X10(3) UL (ref 0–0.2)
BASOPHILS NFR BLD AUTO: 0.8 %
BILIRUB SERPL-MCNC: 0.5 MG/DL (ref 0.1–2)
BUN BLD-MCNC: 67 MG/DL (ref 9–23)
CALCIUM BLD-MCNC: 8.2 MG/DL (ref 8.5–10.1)
CHLORIDE SERPL-SCNC: 110 MMOL/L (ref 98–112)
CO2 SERPL-SCNC: 19 MMOL/L (ref 21–32)
CREAT BLD-MCNC: 10.2 MG/DL
EGFRCR SERPLBLD CKD-EPI 2021: 6 ML/MIN/1.73M2 (ref 60–?)
EOSINOPHIL # BLD AUTO: 0.27 X10(3) UL (ref 0–0.7)
EOSINOPHIL NFR BLD AUTO: 3.3 %
ERYTHROCYTE [DISTWIDTH] IN BLOOD BY AUTOMATED COUNT: 12.8 %
GLOBULIN PLAS-MCNC: 3.9 G/DL (ref 2.8–4.4)
GLUCOSE BLD-MCNC: 104 MG/DL (ref 70–99)
GLUCOSE BLD-MCNC: 91 MG/DL (ref 70–99)
HCT VFR BLD AUTO: 27.1 %
HGB BLD-MCNC: 9.4 G/DL
IMM GRANULOCYTES # BLD AUTO: 0.03 X10(3) UL (ref 0–1)
IMM GRANULOCYTES NFR BLD: 0.4 %
LACTATE SERPL-SCNC: 0.9 MMOL/L (ref 0.4–2)
LYMPHOCYTES # BLD AUTO: 1.07 X10(3) UL (ref 1–4)
LYMPHOCYTES NFR BLD AUTO: 12.9 %
MCH RBC QN AUTO: 32.1 PG (ref 26–34)
MCHC RBC AUTO-ENTMCNC: 34.7 G/DL (ref 31–37)
MCV RBC AUTO: 92.5 FL
MONOCYTES # BLD AUTO: 1.05 X10(3) UL (ref 0.1–1)
MONOCYTES NFR BLD AUTO: 12.7 %
NEUTROPHILS # BLD AUTO: 5.8 X10 (3) UL (ref 1.5–7.7)
NEUTROPHILS # BLD AUTO: 5.8 X10(3) UL (ref 1.5–7.7)
NEUTROPHILS NFR BLD AUTO: 69.9 %
OSMOLALITY SERPL CALC.SUM OF ELEC: 304 MOSM/KG (ref 275–295)
PLATELET # BLD AUTO: 191 10(3)UL (ref 150–450)
POTASSIUM SERPL-SCNC: 4.7 MMOL/L (ref 3.5–5.1)
PROT SERPL-MCNC: 7.2 G/DL (ref 6.4–8.2)
RBC # BLD AUTO: 2.93 X10(6)UL
SODIUM SERPL-SCNC: 137 MMOL/L (ref 136–145)
WBC # BLD AUTO: 8.3 X10(3) UL (ref 4–11)

## 2024-05-14 PROCEDURE — 85025 COMPLETE CBC W/AUTO DIFF WBC: CPT | Performed by: EMERGENCY MEDICINE

## 2024-05-14 PROCEDURE — 36415 COLL VENOUS BLD VENIPUNCTURE: CPT

## 2024-05-14 PROCEDURE — 99285 EMERGENCY DEPT VISIT HI MDM: CPT

## 2024-05-14 PROCEDURE — 80053 COMPREHEN METABOLIC PANEL: CPT | Performed by: EMERGENCY MEDICINE

## 2024-05-14 PROCEDURE — 70360 X-RAY EXAM OF NECK: CPT | Performed by: EMERGENCY MEDICINE

## 2024-05-14 PROCEDURE — 82962 GLUCOSE BLOOD TEST: CPT

## 2024-05-14 PROCEDURE — 70490 CT SOFT TISSUE NECK W/O DYE: CPT | Performed by: EMERGENCY MEDICINE

## 2024-05-14 PROCEDURE — 96367 TX/PROPH/DG ADDL SEQ IV INF: CPT

## 2024-05-14 PROCEDURE — 87040 BLOOD CULTURE FOR BACTERIA: CPT | Performed by: EMERGENCY MEDICINE

## 2024-05-14 PROCEDURE — 71045 X-RAY EXAM CHEST 1 VIEW: CPT | Performed by: EMERGENCY MEDICINE

## 2024-05-14 PROCEDURE — 83605 ASSAY OF LACTIC ACID: CPT | Performed by: EMERGENCY MEDICINE

## 2024-05-14 PROCEDURE — 96365 THER/PROPH/DIAG IV INF INIT: CPT

## 2024-05-14 RX ORDER — HYDROCODONE BITARTRATE AND ACETAMINOPHEN 5; 325 MG/1; MG/1
2 TABLET ORAL EVERY 4 HOURS PRN
Status: DISCONTINUED | OUTPATIENT
Start: 2024-05-14 | End: 2024-05-17

## 2024-05-14 RX ORDER — DEXTROSE MONOHYDRATE 25 G/50ML
50 INJECTION, SOLUTION INTRAVENOUS
Status: DISCONTINUED | OUTPATIENT
Start: 2024-05-14 | End: 2024-05-17

## 2024-05-14 RX ORDER — CARVEDILOL 12.5 MG/1
25 TABLET ORAL 2 TIMES DAILY WITH MEALS
Status: DISCONTINUED | OUTPATIENT
Start: 2024-05-14 | End: 2024-05-17

## 2024-05-14 RX ORDER — PROCHLORPERAZINE EDISYLATE 5 MG/ML
5 INJECTION INTRAMUSCULAR; INTRAVENOUS EVERY 8 HOURS PRN
Status: DISCONTINUED | OUTPATIENT
Start: 2024-05-14 | End: 2024-05-17

## 2024-05-14 RX ORDER — FLUTICASONE PROPIONATE 50 MCG
1 SPRAY, SUSPENSION (ML) NASAL DAILY
Status: DISCONTINUED | OUTPATIENT
Start: 2024-05-14 | End: 2024-05-17

## 2024-05-14 RX ORDER — NICOTINE POLACRILEX 4 MG
15 LOZENGE BUCCAL
Status: DISCONTINUED | OUTPATIENT
Start: 2024-05-14 | End: 2024-05-17

## 2024-05-14 RX ORDER — HYDROMORPHONE HYDROCHLORIDE 1 MG/ML
0.2 INJECTION, SOLUTION INTRAMUSCULAR; INTRAVENOUS; SUBCUTANEOUS EVERY 2 HOUR PRN
Status: DISCONTINUED | OUTPATIENT
Start: 2024-05-14 | End: 2024-05-16

## 2024-05-14 RX ORDER — ONDANSETRON 2 MG/ML
4 INJECTION INTRAMUSCULAR; INTRAVENOUS EVERY 6 HOURS PRN
Status: DISCONTINUED | OUTPATIENT
Start: 2024-05-14 | End: 2024-05-17

## 2024-05-14 RX ORDER — BENZONATATE 200 MG/1
200 CAPSULE ORAL 3 TIMES DAILY PRN
Status: DISCONTINUED | OUTPATIENT
Start: 2024-05-14 | End: 2024-05-17

## 2024-05-14 RX ORDER — HYDROMORPHONE HYDROCHLORIDE 1 MG/ML
0.8 INJECTION, SOLUTION INTRAMUSCULAR; INTRAVENOUS; SUBCUTANEOUS EVERY 2 HOUR PRN
Status: DISCONTINUED | OUTPATIENT
Start: 2024-05-14 | End: 2024-05-16

## 2024-05-14 RX ORDER — ACETAMINOPHEN 500 MG
500 TABLET ORAL EVERY 4 HOURS PRN
Status: DISCONTINUED | OUTPATIENT
Start: 2024-05-14 | End: 2024-05-17

## 2024-05-14 RX ORDER — ARIPIPRAZOLE 5 MG/1
5 TABLET ORAL DAILY
Status: DISCONTINUED | OUTPATIENT
Start: 2024-05-14 | End: 2024-05-17

## 2024-05-14 RX ORDER — HYDROMORPHONE HYDROCHLORIDE 1 MG/ML
0.5 INJECTION, SOLUTION INTRAMUSCULAR; INTRAVENOUS; SUBCUTANEOUS EVERY 30 MIN PRN
Status: DISCONTINUED | OUTPATIENT
Start: 2024-05-14 | End: 2024-05-14

## 2024-05-14 RX ORDER — ACETAMINOPHEN 325 MG/1
650 TABLET ORAL EVERY 4 HOURS PRN
Status: DISCONTINUED | OUTPATIENT
Start: 2024-05-14 | End: 2024-05-17

## 2024-05-14 RX ORDER — ISOSORBIDE MONONITRATE 30 MG/1
30 TABLET, EXTENDED RELEASE ORAL DAILY
Status: DISCONTINUED | OUTPATIENT
Start: 2024-05-14 | End: 2024-05-17

## 2024-05-14 RX ORDER — MELATONIN
3 NIGHTLY PRN
Status: DISCONTINUED | OUTPATIENT
Start: 2024-05-14 | End: 2024-05-17

## 2024-05-14 RX ORDER — SENNOSIDES 8.6 MG
17.2 TABLET ORAL NIGHTLY PRN
Status: DISCONTINUED | OUTPATIENT
Start: 2024-05-14 | End: 2024-05-17

## 2024-05-14 RX ORDER — ONDANSETRON 2 MG/ML
4 INJECTION INTRAMUSCULAR; INTRAVENOUS EVERY 4 HOURS PRN
Status: DISCONTINUED | OUTPATIENT
Start: 2024-05-14 | End: 2024-05-14

## 2024-05-14 RX ORDER — HEPARIN SODIUM 5000 [USP'U]/ML
5000 INJECTION, SOLUTION INTRAVENOUS; SUBCUTANEOUS EVERY 8 HOURS SCHEDULED
Status: DISCONTINUED | OUTPATIENT
Start: 2024-05-14 | End: 2024-05-17

## 2024-05-14 RX ORDER — BISACODYL 10 MG
10 SUPPOSITORY, RECTAL RECTAL
Status: DISCONTINUED | OUTPATIENT
Start: 2024-05-14 | End: 2024-05-17

## 2024-05-14 RX ORDER — HYOSCYAMINE SULFATE 0.125 MG
0.12 TABLET,DISINTEGRATING ORAL 3 TIMES DAILY
Status: DISCONTINUED | OUTPATIENT
Start: 2024-05-14 | End: 2024-05-17

## 2024-05-14 RX ORDER — CLONIDINE HYDROCHLORIDE 0.1 MG/1
0.1 TABLET ORAL
Status: DISCONTINUED | OUTPATIENT
Start: 2024-05-15 | End: 2024-05-17

## 2024-05-14 RX ORDER — NICOTINE POLACRILEX 4 MG
30 LOZENGE BUCCAL
Status: DISCONTINUED | OUTPATIENT
Start: 2024-05-14 | End: 2024-05-17

## 2024-05-14 RX ORDER — FLUOXETINE HYDROCHLORIDE 20 MG/1
40 CAPSULE ORAL DAILY
Status: DISCONTINUED | OUTPATIENT
Start: 2024-05-14 | End: 2024-05-17

## 2024-05-14 RX ORDER — HYDROMORPHONE HYDROCHLORIDE 1 MG/ML
0.4 INJECTION, SOLUTION INTRAMUSCULAR; INTRAVENOUS; SUBCUTANEOUS EVERY 2 HOUR PRN
Status: DISCONTINUED | OUTPATIENT
Start: 2024-05-14 | End: 2024-05-16

## 2024-05-14 RX ORDER — POLYETHYLENE GLYCOL 3350 17 G/17G
17 POWDER, FOR SOLUTION ORAL DAILY PRN
Status: DISCONTINUED | OUTPATIENT
Start: 2024-05-14 | End: 2024-05-17

## 2024-05-14 RX ORDER — SODIUM CHLORIDE 9 MG/ML
INJECTION, SOLUTION INTRAVENOUS CONTINUOUS
Status: DISCONTINUED | OUTPATIENT
Start: 2024-05-14 | End: 2024-05-15

## 2024-05-14 RX ORDER — SEVELAMER CARBONATE 800 MG/1
800 TABLET, FILM COATED ORAL
Status: DISCONTINUED | OUTPATIENT
Start: 2024-05-15 | End: 2024-05-17

## 2024-05-14 RX ORDER — HYDRALAZINE HYDROCHLORIDE 20 MG/ML
10 INJECTION INTRAMUSCULAR; INTRAVENOUS EVERY 6 HOURS PRN
Status: DISCONTINUED | OUTPATIENT
Start: 2024-05-14 | End: 2024-05-15

## 2024-05-14 RX ORDER — LOSARTAN POTASSIUM 100 MG/1
100 TABLET ORAL DAILY
Status: DISCONTINUED | OUTPATIENT
Start: 2024-05-14 | End: 2024-05-17

## 2024-05-14 RX ORDER — LAMOTRIGINE 150 MG/1
150 TABLET ORAL DAILY
Status: DISCONTINUED | OUTPATIENT
Start: 2024-05-14 | End: 2024-05-17

## 2024-05-14 RX ORDER — BUPROPION HYDROCHLORIDE 150 MG/1
150 TABLET ORAL DAILY
Status: DISCONTINUED | OUTPATIENT
Start: 2024-05-14 | End: 2024-05-17

## 2024-05-14 RX ORDER — FENOFIBRATE 134 MG/1
134 CAPSULE ORAL NIGHTLY
Status: DISCONTINUED | OUTPATIENT
Start: 2024-05-14 | End: 2024-05-17

## 2024-05-14 RX ORDER — HYDROCODONE BITARTRATE AND ACETAMINOPHEN 5; 325 MG/1; MG/1
1 TABLET ORAL EVERY 4 HOURS PRN
Status: DISCONTINUED | OUTPATIENT
Start: 2024-05-14 | End: 2024-05-17

## 2024-05-14 RX ORDER — DEXTROAMPHETAMINE SACCHARATE, AMPHETAMINE ASPARTATE, DEXTROAMPHETAMINE SULFATE AND AMPHETAMINE SULFATE 2.5; 2.5; 2.5; 2.5 MG/1; MG/1; MG/1; MG/1
10 TABLET ORAL
Status: DISCONTINUED | OUTPATIENT
Start: 2024-05-15 | End: 2024-05-17

## 2024-05-14 RX ORDER — HYDRALAZINE HYDROCHLORIDE 50 MG/1
50 TABLET, FILM COATED ORAL 2 TIMES DAILY
Status: DISCONTINUED | OUTPATIENT
Start: 2024-05-14 | End: 2024-05-17

## 2024-05-14 NOTE — ED QUICK NOTES
Orders for admission, patient is aware of plan and ready to go upstairs. Any questions, please call ED JASON Lamar  at extension [phone].     Vaccinated? yes  Type of COVID test sent: n/a  COVID Suspicion level: Low      Titratable drug(s) infusing: n/a  Rate:Zithromax 250cc/hr    LOC at time of transport:alert    Other pertinent information: n/a    CIWA score= n/a  NIH score=  n/a

## 2024-05-14 NOTE — ED INITIAL ASSESSMENT (HPI)
Pt to ed after missing dialysis yesterday d/t being ER or SOB. Today with difficulty swallowing and LIZBETH  Tongue appropriate size

## 2024-05-14 NOTE — ED PROVIDER NOTES
Patient Seen in: [facility] [place]      History     Chief Complaint   Patient presents with    Difficulty Breathing     Stated Complaint: missed dialysis yesterday, now with difficulty swallowing and pelra    Subjective:   HPI    44-year-old male who presents here to the emergency department complaining of having cough, shortness of breath, left-sided tongue pain and throat discomfort.  Reviewing his records he was seen here yesterday on 5/13/2024 for the similar complaint and diagnosed with viral syndrome.  He says that he has had continued pain in his throat.  No fevers but he was sweating all night.  No difficulty handling his secretions.  He said he missed his dialysis yesterday though he is usually scheduled for Tuesdays Thursdays and Saturdays he did have a scheduled dialysis on Monday that he missed.  He said he is noticed some rattling in his chest on the right side.  He had a cough that been nonproductive.    Objective:   Past Medical History:    Asthma ([facility])    Attention deficit hyperactivity disorder (ADHD)    Back problem    Bipolar 1 disorder ([facility])    Cancer ([facility])    CKD (chronic kidney disease) stage 3, GFR 30-59 ml/min ([facility])    Dr Meeks    Congestive heart disease ([facility])    COPD (chronic obstructive pulmonary disease) ([facility])    Coronary atherosclerosis    Deep vein thrombosis ([facility])    at age 17 R/T cast    Depression    Diabetes ([facility])    Essential hypertension    3/21 echo: severe concentric LVH with normal EF and no MR or pHTN    Extrinsic asthma, unspecified    Heart attack ([facility])    2016- angiogram- no intervention    Heart valve disease    mitral valve repair in 1994/    High blood pressure    High cholesterol    History of mitral valve repair    Hyperlipidemia    Low back pain    tight and stiff after sweeping and mopping    LVH (left ventricular hypertrophy)    Migraines    Mixed hyperlipidemia     HDL 38 LDL 97 VLDL 57     Monoclonal gammopathy    IgG kappa      MVP (mitral valve prolapse)    Repair  at [facility]; echoes as recently as 3/21 show mild or trivial MR and no stenosis    Neuropathy    Osteoarthritis    hip ,knees    Pneumonia due to organism    Pulmonary embolism (HCC)    Renal disorder    TIA (transient ischemic attack)    Initial history of left-sided weakness and slurred speech. (+) cocaine. MRI of the brain, CT angiogram of the head and neck, and 2D echo are all unremarkable.     TMJ (dislocation of temporomandibular joint)    Troponin level elevated    Trop 60 60 47 with TIA and no CP: Lexiscan negative with EF 51              Past Surgical History:   Procedure Laterality Date    Colonoscopy N/A 2023    Procedure: COLONOSCOPY;  Surgeon: Heath Vu MD;  Location:  ENDOSCOPY    Colonoscopy N/A 2023    Procedure: COLONOSCOPY with cold snare polypectomy and forcep polypectomy;  Surgeon: Ousmane Suarez MD;  Location:  ENDOSCOPY    Colonoscopy & polypectomy      [facility]  2019    Duodenitis. Biopsied. EUS for weight loss was negative    Heart surgery      Hernia surgery  2022    Dr Barnes    Laminectomy,>2 sgmt,lumbar  2018    L4-L5 Decomp Discectomy ROEM L4-L5    Mitralplasty w cp bypass      [facility]: Repair    Repair rotator cuff,chronic Left     torn and had a ruptured bicep    Valve repair      mitral valve                Social History     Socioeconomic History    Marital status:    Tobacco Use    Smoking status: Former     Current packs/day: 0.00     Average packs/day: 1 pack/day for 27.0 years (27.0 ttl pk-yrs)     Types: Cigarettes     Start date: 1995     Quit date: 2022     Years since quittin.2    Smokeless tobacco: Never   Vaping Use    Vaping status: Never Used   Substance and Sexual Activity    Alcohol use: No    Drug use: No     Social Determinants of Health     Financial Resource Strain: Medium Risk (2024)    Received from [facility]    Overall Financial Resource Strain  (CARDIA)     Difficulty of Paying Living Expenses: Somewhat hard   Food Insecurity: No Food Insecurity (5/7/2024)    Received from [facility]    Hunger Vital Sign     Worried About Running Out of Food in the Last Year: Never true     Ran Out of Food in the Last Year: Never true   Transportation Needs: No Transportation Needs (5/7/2024)    Received from [facility]    PRAPARE - Transportation     Lack of Transportation (Medical): No     Lack of Transportation (Non-Medical): No   Physical Activity: Inactive (5/7/2024)    Received from [facility]    Exercise Vital Sign     Days of Exercise per Week: 0 days     Minutes of Exercise per Session: 0 min   Stress: Stress Concern Present (5/7/2024)    Received from [facility]    [place] [employer] - Occupational Stress Questionnaire     Feeling of Stress : Rather much   Social Connections: Socially Isolated (5/7/2024)    Received from [facility]    Social Connection and Isolation Panel [NHANES]     Frequency of Communication with Friends and Family: More than three times a week     Frequency of Social Gatherings with Friends and Family: More than three times a week     Attends Shinto Services: Never     Active Member of Clubs or Organizations: No     Attends Club or Organization Meetings: Never     Marital Status: Patient unable to answer   Housing Stability: Low Risk  (5/7/2024)    Received from [facility]    Housing Stability Vital Sign     Unable to Pay for Housing in the Last Year: No     Number of Places Lived in the Last Year: 1     In the last 12 months, was there a time when you did not have a steady place to sleep or slept in a shelter (including now)?: No              Review of Systems    Positive for stated complaint: missed dialysis yesterday, now with difficulty swallowing and perla  Other systems are as noted in HPI.  Constitutional and vital signs reviewed.      All other systems  reviewed and negative except as noted above.    Physical Exam     ED Triage Vitals   BP 05/14/24 1232 (!) 188/107   Pulse 05/14/24 1230 107   Resp 05/14/24 1230 20   Temp 05/14/24 1230 98.8 °F (37.1 °C)   Temp src 05/14/24 1230 Temporal   SpO2 05/14/24 1230 97 %   O2 Device 05/14/24 1230 None (Room air)       Current Vitals:   Vital Signs  BP: (!) 173/111  Pulse: 102  Resp: 24  Temp: 98.6 °F (37 °C)  Temp src: Oral    Oxygen Therapy  SpO2: 95 %  O2 Device: None (Room air)            Physical Exam  General: This a pleasant but ill-appearing 44-year-old male who has a slight hot potato voice.  No drooling.    HEENT: Pupils are equal reactive to light.  Extra ocular motions are intact.  No scleral icterus or conjunctival pallor.  Posterior pharynx with no erythema uvula is midline there is no swelling of the posterior pharynx.  No exudates.  Tongue is midline.  Mucosa slightly dry.  He has some pain on palpation along the angle of the jaw without swelling as well as in the submandibular area.  Tympanic membrane's are intact and clear bilaterally.  No bulging.  No stridorous breathing.    Lungs: Rhonchi and rales noted in the right lung base.    Cardiac: Heart rate of 110 normal S1 and 2 without murmurs or ectopy appreciated  Abdomen: Soft on examination without tenderness to deep palpation or to percussion.  No masses appreciated.  Bowel sounds are normoactive.  No CVA tenderness.  Extremities: No cyanosis, no edema or clubbing.  Pulses are +2.  Full range of motion is noted of the extremities without deformities.  No tenderness.  Neurologically intact.       ED Course     Labs Reviewed   COMP METABOLIC PANEL (14) - Abnormal; Notable for the following components:       Result Value    Glucose 104 (*)     CO2 19.0 (*)     BUN 67 (*)     Creatinine 10.20 (*)     Calcium, Total 8.2 (*)     Calculated Osmolality 304 (*)     eGFR-Cr 6 (*)     Albumin 3.3 (*)     A/G Ratio 0.8 (*)     All other components within normal  limits   CBC W/ DIFFERENTIAL - Abnormal; Notable for the following components:    RBC 2.93 (*)     HGB 9.4 (*)     HCT 27.1 (*)     Monocyte Absolute 1.05 (*)     All other components within normal limits   LACTIC ACID, PLASMA - Normal   CBC WITH DIFFERENTIAL WITH PLATELET    Narrative:     The following orders were created for panel order CBC With Differential With Platelet.  Procedure                               Abnormality         Status                     ---------                               -----------         ------                     CBC W/ DIFFERENTIAL[[id number]]          Abnormal            Final result                 Please view results for these tests on the individual orders.   BLOOD CULTURE   BLOOD CULTURE     Narrative  PROCEDURE:  XR CHEST AP PORTABLE  (CPT=71045)     TECHNIQUE:  AP chest radiograph was obtained.     COMPARISON:  [facility], XR, XR CHEST AP PORTABLE  (CPT=71045), 4/02/2024, 4:53 PM.     INDICATIONS:  missed dialysis yesterday, now with difficulty swallowing and perla     PATIENT STATED HISTORY: (As transcribed by Technologist)  missed dialysis yesterday, now with difficulty swallowing and perla         FINDINGS:  There is a tunneled right internal jugular hemodialysis catheter with the tip at the SVC right atrial junction.  There is cardiomegaly with a prosthetic heart valve.  There is mild vascular prominence.  There is diffuse right lung interstitial   and to a lesser extent airspace infiltrates seen in the upper, mid lower lung fields.  There is blunting the right costophrenic angle which was present previously and is consistent with a stable small right pleural effusion.  There is no pneumothorax.                  Impression  CONCLUSION:    1. New mixed interstitial and airspace infiltrates in the right lung could reflect pneumonia in the proper clinical setting.  Aspiration pneumonitis is also possible.    2. Cardiomegaly.  Status post heart valve surgery.  Mild vascular  congestion.  3. Stable small right pleural effusion.          LOCATION:  [facility]                 Dictated by (CST): Wilton Sharp MD on 5/14/2024 at 2:16 PM      Finalized by (CST): Wilton Sharp MD on 5/14/2024 at 2:17 PM          Narrative  PROCEDURE:  XR SOFT TISSUE NECK (CPT=70360)     COMPARISON:  None.     INDICATIONS:  missed dialysis yesterday, now with difficulty swallowing and perla     PATIENT STATED HISTORY: (As transcribed by Technologist)  missed dialysis yesterday, now with difficulty swallowing and perla.         FINDINGS:  There is some lucency seen in the prominent submandibular soft tissues which could reflect either edema or gas.  CT recommended for further evaluation.     The epiglottis does not appear thickened.  No prevertebral soft tissue swelling is appreciated.  No gross adenoidal hypertrophy is seen.     No acute osseous abnormality is identified.  Facet arthropathy is seen in the cervical spine bilaterally.                        Impression  CONCLUSION:  Vague lucency seen in the submandibular soft tissues which could reflect either edema or gas.  Correlate with clinical exam and CT recommended for further evaluation if indicated clinically.        LOCATION:  [facility]           Dictated by (CST): Wilton Sharp MD on 5/14/2024 at 2:14 PM      Finalized by (CST): Wilton Sharp MD on 5/14/2024 at 2:15 PM                  Narrative  PROCEDURE:  CT SOFT TISSUE OF NECK (CPT=70490)     COMPARISON:  [facility], XR, XR CHEST AP PORTABLE  (CPT=71045), 5/14/2024, 1:46 PM.  [facility] , CT, CT ABDOMEN+PELVIS(CPT=74176), 5/05/2024, 6:00 AM.     INDICATIONS:  missed dialysis yesterday, now with difficulty swallowing and perla     TECHNIQUE:  Noncontrast CT scanning is performed through the neck soft tissues.  Dose reduction techniques were used. Dose information is transmitted to the ACR (American College of Radiology) NRDR (National Radiology Data Registry) which includes the  Dose Index  Registry.     PATIENT STATED HISTORY: (As transcribed by Technologist)  Patient complains of difficulty swallowing and difficulty breathing.         FINDINGS:  Views of the paranasal sinuses show no significant sinus disease. . The nasopharynx and oropharynx are unremarkable. The parotid glands are normal in appearance bilaterally. The hypopharynx and the larynx are unremarkable. The submandibular glands are  within normal limits bilaterally. No significant lymphadenopathy is appreciated.  No significant vasculature abnormalities are appreciated.  Dialysis catheter noted with right IJ approach.  Ground-glass opacities within the right lung.                  Impression  CONCLUSION:    1. No findings to correlate with patient's dysphagia.  2. Ground-glass opacities within the right upper lobe suggestive of pneumonia although underlying edema cannot be excluded.                   LOCATION:  [facility]           Dictated by (CST): Quinn Bernal MD on 5/14/2024 at 3:28 PM      Finalized by (CST): Quinn Bernal MD on 5/14/2024 at 3:31 PM             MDM    Differential diagnosis includes but is not limited to pneumonia, pharyngitis, sepsis from infectious source such as pneumonia, fluid overload, electrolyte abnormality.  Admission disposition: 5/14/2024  4:06 PM       The patient's hemoglobin is 9.4 hematocrit 27.1.  Lactic acid 0.9.  The patient is not septic.  Glucose of 104 CO2 of 19 BUN of 67 creatinine of 10.2.  Calcium of 8.2.  I reviewed the x-rays and agree with the radiologist report that showed  Chest x-ray showed new mixed interstitial and airspace infiltrates in the right lung that could reflect pneumonia in the proper clinical setting.  Aspiration pneumonitis is also possible etiology.  Cardiomegaly status post heart valve surgery.  Mild vascular congestion.  Small stable right pleural effusion.  X-ray of the soft tissue of the neck showed vague lucency seen in the submandibular soft tissues which could  either reflect either edema or gas.  A CT scan was then performed.  CT scan did not show any findings to correlate with the patient's dysphagia.  Groundglass opacities within the right upper lobe of the lung suggestive of pneumonia.    I explained to the patient I did not have any specific etiology to account for his dysphagia and glottic sensation.  He did however have pneumonia and he had missed his dialysis.  I did have the hospitalist service contacted to [facility] and will speak to the renal service to arrange the patient's dialysis.  He received ceftriaxone and azithromycin intravenously.  He will be admitted to the hospital for continued care.  He will need transfer via ambulance for continued oxygen and cardiac monitoring.                     Medical Decision Making      Disposition and Plan     Clinical Impression:  1. Community acquired pneumonia of right lower lobe of lung    2. Acute renal failure with acute renal cortical necrosis superimposed on stage 4 chronic kidney disease (HCC)         Disposition:  Admit  5/14/2024  4:06 pm    Follow-up:  No follow-up provider specified.        Medications Prescribed:  Current Discharge Medication List                            Hospital Problems       Present on Admission  Date Reviewed: 5/13/2024            ICD-10-CM Noted POA    * (Principal) Community acquired pneumonia of right lower lobe of lung J18.9 5/14/2024 Unknown

## 2024-05-15 LAB
ADENOVIRUS PCR:: NOT DETECTED
ANION GAP SERPL CALC-SCNC: 11 MMOL/L (ref 0–18)
B PARAPERT DNA SPEC QL NAA+PROBE: NOT DETECTED
B PERT DNA SPEC QL NAA+PROBE: NOT DETECTED
BASOPHILS # BLD AUTO: 0.07 X10(3) UL (ref 0–0.2)
BASOPHILS NFR BLD AUTO: 1 %
BUN BLD-MCNC: 61 MG/DL (ref 9–23)
C PNEUM DNA SPEC QL NAA+PROBE: NOT DETECTED
CALCIUM BLD-MCNC: 8.1 MG/DL (ref 8.5–10.1)
CHLORIDE SERPL-SCNC: 112 MMOL/L (ref 98–112)
CO2 SERPL-SCNC: 16 MMOL/L (ref 21–32)
CORONAVIRUS 229E PCR:: NOT DETECTED
CORONAVIRUS HKU1 PCR:: NOT DETECTED
CORONAVIRUS NL63 PCR:: NOT DETECTED
CORONAVIRUS OC43 PCR:: NOT DETECTED
CREAT BLD-MCNC: 9.4 MG/DL
EGFRCR SERPLBLD CKD-EPI 2021: 6 ML/MIN/1.73M2 (ref 60–?)
EOSINOPHIL # BLD AUTO: 0.3 X10(3) UL (ref 0–0.7)
EOSINOPHIL NFR BLD AUTO: 4.4 %
ERYTHROCYTE [DISTWIDTH] IN BLOOD BY AUTOMATED COUNT: 12.6 %
FLUAV RNA SPEC QL NAA+PROBE: NOT DETECTED
FLUBV RNA SPEC QL NAA+PROBE: NOT DETECTED
GLUCOSE BLD-MCNC: 110 MG/DL (ref 70–99)
GLUCOSE BLD-MCNC: 113 MG/DL (ref 70–99)
GLUCOSE BLD-MCNC: 85 MG/DL (ref 70–99)
GLUCOSE BLD-MCNC: 87 MG/DL (ref 70–99)
GLUCOSE BLD-MCNC: 88 MG/DL (ref 70–99)
HBV SURFACE AB SER QL: REACTIVE
HBV SURFACE AB SERPL IA-ACNC: 26.83 MIU/ML
HBV SURFACE AG SER-ACNC: 0.13 [IU]/L
HBV SURFACE AG SERPL QL IA: NONREACTIVE
HCT VFR BLD AUTO: 27.9 %
HGB BLD-MCNC: 9.3 G/DL
IMM GRANULOCYTES # BLD AUTO: 0.01 X10(3) UL (ref 0–1)
IMM GRANULOCYTES NFR BLD: 0.1 %
LYMPHOCYTES # BLD AUTO: 1.22 X10(3) UL (ref 1–4)
LYMPHOCYTES NFR BLD AUTO: 17.9 %
MCH RBC QN AUTO: 31.8 PG (ref 26–34)
MCHC RBC AUTO-ENTMCNC: 33.3 G/DL (ref 31–37)
MCV RBC AUTO: 95.5 FL
METAPNEUMOVIRUS PCR:: NOT DETECTED
MONOCYTES # BLD AUTO: 1 X10(3) UL (ref 0.1–1)
MONOCYTES NFR BLD AUTO: 14.7 %
MYCOPLASMA PNEUMONIA PCR:: NOT DETECTED
NEUTROPHILS # BLD AUTO: 4.2 X10 (3) UL (ref 1.5–7.7)
NEUTROPHILS # BLD AUTO: 4.2 X10(3) UL (ref 1.5–7.7)
NEUTROPHILS NFR BLD AUTO: 61.9 %
OSMOLALITY SERPL CALC.SUM OF ELEC: 305 MOSM/KG (ref 275–295)
PARAINFLUENZA 1 PCR:: NOT DETECTED
PARAINFLUENZA 2 PCR:: NOT DETECTED
PARAINFLUENZA 3 PCR:: NOT DETECTED
PARAINFLUENZA 4 PCR:: NOT DETECTED
PLATELET # BLD AUTO: 175 10(3)UL (ref 150–450)
POTASSIUM SERPL-SCNC: 4.1 MMOL/L (ref 3.5–5.1)
RBC # BLD AUTO: 2.92 X10(6)UL
RHINOVIRUS/ENTERO PCR:: NOT DETECTED
RSV RNA SPEC QL NAA+PROBE: NOT DETECTED
SARS-COV-2 RNA NPH QL NAA+NON-PROBE: NOT DETECTED
SODIUM SERPL-SCNC: 139 MMOL/L (ref 136–145)
WBC # BLD AUTO: 6.8 X10(3) UL (ref 4–11)

## 2024-05-15 PROCEDURE — 90935 HEMODIALYSIS ONE EVALUATION: CPT | Performed by: STUDENT IN AN ORGANIZED HEALTH CARE EDUCATION/TRAINING PROGRAM

## 2024-05-15 PROCEDURE — 82962 GLUCOSE BLOOD TEST: CPT

## 2024-05-15 PROCEDURE — 86706 HEP B SURFACE ANTIBODY: CPT | Performed by: INTERNAL MEDICINE

## 2024-05-15 PROCEDURE — 5A1D70Z PERFORMANCE OF URINARY FILTRATION, INTERMITTENT, LESS THAN 6 HOURS PER DAY: ICD-10-PCS | Performed by: STUDENT IN AN ORGANIZED HEALTH CARE EDUCATION/TRAINING PROGRAM

## 2024-05-15 PROCEDURE — 0202U NFCT DS 22 TRGT SARS-COV-2: CPT | Performed by: INTERNAL MEDICINE

## 2024-05-15 PROCEDURE — 80048 BASIC METABOLIC PNL TOTAL CA: CPT | Performed by: INTERNAL MEDICINE

## 2024-05-15 PROCEDURE — 85025 COMPLETE CBC W/AUTO DIFF WBC: CPT | Performed by: INTERNAL MEDICINE

## 2024-05-15 PROCEDURE — 94799 UNLISTED PULMONARY SVC/PX: CPT

## 2024-05-15 PROCEDURE — 87340 HEPATITIS B SURFACE AG IA: CPT | Performed by: INTERNAL MEDICINE

## 2024-05-15 PROCEDURE — 92610 EVALUATE SWALLOWING FUNCTION: CPT

## 2024-05-15 PROCEDURE — 87430 STREP A AG IA: CPT | Performed by: INTERNAL MEDICINE

## 2024-05-15 PROCEDURE — 94640 AIRWAY INHALATION TREATMENT: CPT

## 2024-05-15 RX ORDER — ASCORBIC ACID, THIAMINE, RIBOFLAVIN, NIACINAMIDE, PYRIDOXINE, FOLIC ACID, COBALAMIN, BIOTIN, PANTOTHENIC ACID 100; 1.5; 1.7; 20; 10; 1; 6; 300; 1 MG/1; MG/1; MG/1; MG/1; MG/1; MG/1; UG/1; UG/1; MG/1
1 TABLET, COATED ORAL DAILY
Status: DISCONTINUED | OUTPATIENT
Start: 2024-05-16 | End: 2024-05-17

## 2024-05-15 RX ORDER — HEPARIN SODIUM 1000 [USP'U]/ML
1.5 INJECTION, SOLUTION INTRAVENOUS; SUBCUTANEOUS
Status: COMPLETED | OUTPATIENT
Start: 2024-05-15 | End: 2024-05-15

## 2024-05-15 RX ORDER — LABETALOL HYDROCHLORIDE 5 MG/ML
10 INJECTION, SOLUTION INTRAVENOUS EVERY 6 HOURS PRN
Status: DISCONTINUED | OUTPATIENT
Start: 2024-05-15 | End: 2024-05-17

## 2024-05-15 RX ORDER — HYDRALAZINE HYDROCHLORIDE 20 MG/ML
20 INJECTION INTRAMUSCULAR; INTRAVENOUS EVERY 6 HOURS PRN
Status: DISCONTINUED | OUTPATIENT
Start: 2024-05-15 | End: 2024-05-17

## 2024-05-15 NOTE — PROGRESS NOTES
Problem:  PNA     Data:  Alert and oriented. Room air. Tele. Monitor Bp's they have been on the higher end. PRN Bp meds given. Accuchecks QID. Dialysis. IV abx. Covid and strep (-). Patient complains of tongue and mouth swelling/pain. CT neck (-). Speech saw patient today and cleared for diet. Nephrology consulted.     Action: Keep patient comfortable and continue to monitor every 2 hours.     Education: POC for the day.    Response: Verbalized understanding.

## 2024-05-15 NOTE — CONSULTS
[facility] - Nephrology  Inpatient Consultation    Godwin Fonseca Patient Status:  Inpatient    1980 MRN [id number]   [facility] 5NW-A Attending Ria Gutierrez MD   Hosp Day # 1 PCP Prieto Novak MD     Reason for consult: ESRD on HD  Date of consultation: 5/15/2024     HISTORY OF PRESENT ILLNESS:     Godwin Fonseca is a 44 year old male with a medical history notable for ESRD on HD TTS who presents with pneumonia. Nephrology consulted for ESRD management.    Missed his dialysis session last Saturday and Tuesday. Has not had dialysis since last Thursday, now about 6 days prior. Denies any shortness of breath, but does have a cough with sore throat. CXR concern for pneumonia. Nephrology consulted for HD management.    REVIEW OF SYSTEMS:     ROS as above, otherwise negative    HISTORY:     Past Medical History:    Asthma ([facility])    Attention deficit hyperactivity disorder (ADHD)    Back problem    Bipolar 1 disorder ([facility])    CKD (chronic kidney disease) stage 3, GFR 30-59 ml/min ([facility])    Dr Meeks    Congestive heart disease ([facility])    COPD (chronic obstructive pulmonary disease) ([facility])    Coronary atherosclerosis    Deep vein thrombosis ([facility])    at age 17 R/T cast    Depression    Diabetes ([facility])    Essential hypertension    3/21 echo: severe concentric LVH with normal EF and no MR or pHTN    Extrinsic asthma, unspecified    Heart attack (HCC)    - angiogram- no intervention    Heart valve disease    mitral valve repair in /    High blood pressure    High cholesterol    History of mitral valve repair    Hyperlipidemia    Low back pain    tight and stiff after sweeping and mopping    LVH (left ventricular hypertrophy)    Migraines    Mixed hyperlipidemia     HDL 38 LDL 97 VLDL 57     Monoclonal gammopathy    IgG kappa     MVP (mitral valve prolapse)    Repair  at [place]; echoes as recently as 3/21 show mild or trivial MR and no stenosis    Neuropathy    Osteoarthritis     hip ,knees    Pneumonia due to organism    Pulmonary embolism (HCC)    Renal disorder    Stroke (HCC)    TIA (transient ischemic attack)    Initial history of left-sided weakness and slurred speech. (+) cocaine. MRI of the brain, CT angiogram of the head and neck, and 2D echo are all unremarkable.     TMJ (dislocation of temporomandibular joint)    Troponin level elevated    Trop 60 60 47 with TIA and no CP: Lexiscan negative with EF 51     Past Surgical History:   Procedure Laterality Date    Colonoscopy N/A 03/26/2023    Procedure: COLONOSCOPY;  Surgeon: Heath Vu MD;  Location:  ENDOSCOPY    Colonoscopy N/A 12/30/2023    Procedure: COLONOSCOPY with cold snare polypectomy and forcep polypectomy;  Surgeon: Ousmane Suarez MD;  Location:  ENDOSCOPY    Colonoscopy & polypectomy  2019    Egd  2019    Duodenitis. Biopsied. EUS for weight loss was negative    Heart surgery      Hernia surgery  08/17/2022    Dr Barnes    Laminectomy,>2 sgmt,lumbar  09/06/2018    L4-L5 Decomp Discectomy ROEM L4-L5    Mitralplasty w cp bypass  1994    [place]: Repair    Repair rotator cuff,chronic Left     torn and had a ruptured bicep    Valve repair  1994    mitral valve     Family History   Problem Relation Age of Onset    Hypertension Father     Alcohol and Other Disorders Associated Father     Substance Abuse Father         cocaine    Dementia Father     Cancer Father         lung    Diabetes Mother     Cancer Mother         multiple myeloma    Hypertension Mother     Anxiety Maternal Aunt     Depression Maternal Aunt     Anxiety Maternal Aunt     Depression Maternal Aunt     Bipolar Disorder Maternal Aunt     Diabetes Maternal Grandmother     Hypertension Maternal Grandmother     Cancer Maternal Grandfather         stomach cancer    Diabetes Maternal Grandfather     Hypertension Maternal Grandfather     Alcohol and Other Disorders Associated Maternal Grandfather     Hypertension Paternal Grandmother     Hypertension  Paternal Grandfather     Cancer Sister         uterine and ovarian    Hypertension Sister     Cancer Maternal Uncle         lung    Cancer Paternal Aunt         throat      reports that he quit smoking about 2 years ago. His smoking use included cigarettes. He started smoking about 29 years ago. He has a 27 pack-year smoking history. He has never used smokeless tobacco. He reports that he does not drink alcohol and does not use drugs.  Allergies   Allergen Reactions    Hydrochlorothiazide RASH and HIVES       MEDICATIONS:       Current Facility-Administered Medications:     hydrALAzine (Apresoline) 20 mg/mL injection 20 mg, 20 mg, Intravenous, Q6H PRN    labetalol (Trandate) 5 mg/mL injection 10 mg, 10 mg, Intravenous, Q6H PRN    benzocaine-menthol (Cepacol) lozenge 1 lozenge, 1 lozenge, Oral, PRN    phenol (Chloraseptic) 1.4 % oral liquid spray, , Oral, Q2H PRN    heparin (Porcine) 1000 UNIT/ML injection 1,500 Units, 1.5 mL, Intracatheter, Once    [START ON 5/16/2024] multivitamin (Dialyvite - Renal) tab 1 tablet, 1 tablet, Oral, Daily    heparin (Porcine) 5000 UNIT/ML injection 5,000 Units, 5,000 Units, Subcutaneous, Q8H MARTHA    acetaminophen (Tylenol Extra Strength) tab 500 mg, 500 mg, Oral, Q4H PRN    melatonin tab 3 mg, 3 mg, Oral, Nightly PRN    acetaminophen (Tylenol) tab 650 mg, 650 mg, Oral, Q4H PRN **OR** HYDROcodone-acetaminophen (Norco) 5-325 MG per tab 1 tablet, 1 tablet, Oral, Q4H PRN **OR** HYDROcodone-acetaminophen (Norco) 5-325 MG per tab 2 tablet, 2 tablet, Oral, Q4H PRN    benzonatate (Tessalon) cap 200 mg, 200 mg, Oral, TID PRN    guaiFENesin (Robitussin) 100 MG/5 ML oral liquid 200 mg, 200 mg, Oral, Q4H PRN    ondansetron (Zofran) 4 MG/2ML injection 4 mg, 4 mg, Intravenous, Q6H PRN    prochlorperazine (Compazine) 10 MG/2ML injection 5 mg, 5 mg, Intravenous, Q8H PRN    polyethylene glycol (PEG 3350) (Miralax) 17 g oral packet 17 g, 17 g, Oral, Daily PRN    sennosides (Senokot) tab 17.2 mg, 17.2  mg, Oral, Nightly PRN    bisacodyl (Dulcolax) 10 MG rectal suppository 10 mg, 10 mg, Rectal, Daily PRN    glucose (Dex4) 15 GM/59ML oral liquid 15 g, 15 g, Oral, Q15 Min PRN **OR** glucose (Glutose) 40% oral gel 15 g, 15 g, Oral, Q15 Min PRN **OR** glucose-vitamin C (Dex-4) chewable tab 4 tablet, 4 tablet, Oral, Q15 Min PRN **OR** dextrose 50% injection 50 mL, 50 mL, Intravenous, Q15 Min PRN **OR** glucose (Dex4) 15 GM/59ML oral liquid 30 g, 30 g, Oral, Q15 Min PRN **OR** glucose (Glutose) 40% oral gel 30 g, 30 g, Oral, Q15 Min PRN **OR** glucose-vitamin C (Dex-4) chewable tab 8 tablet, 8 tablet, Oral, Q15 Min PRN    insulin aspart (NovoLOG) 100 Units/mL FlexPen 1-10 Units, 1-10 Units, Subcutaneous, TID AC and HS    azithromycin (Zithromax) 500 mg in sodium chloride 0.9% 250 mL IVPB, 500 mg, Intravenous, Q24H    cefTRIAXone (Rocephin) 2 g in D5W 100 mL IVPB-ADD, 2 g, Intravenous, Q24H    HYDROmorphone (Dilaudid) 1 MG/ML injection 0.2 mg, 0.2 mg, Intravenous, Q2H PRN **OR** HYDROmorphone (Dilaudid) 1 MG/ML injection 0.4 mg, 0.4 mg, Intravenous, Q2H PRN **OR** HYDROmorphone (Dilaudid) 1 MG/ML injection 0.8 mg, 0.8 mg, Intravenous, Q2H PRN    ARIPiprazole (Abilify) tab 5 mg, 5 mg, Oral, Daily    buPROPion ER (Wellbutrin XL) 24 hr tab 150 mg, 150 mg, Oral, Daily    carvedilol (Coreg) tab 25 mg, 25 mg, Oral, BID with meals    cloNIDine (Catapres) tab 0.1 mg, 0.1 mg, Oral, Daily with food    fenofibrate micronized (Lofibra) cap 134 mg, 134 mg, Oral, Nightly    FLUoxetine (PROzac) cap 40 mg, 40 mg, Oral, Daily    fluticasone propionate (Flonase) 50 MCG/ACT nasal suspension 1 spray, 1 spray, Each Nare, Daily    hydrALAZINE (Apresoline) tab 50 mg, 50 mg, Oral, BID    hyoscyamine sulfate (LEVSIN) disintegrating tab 0.125 mg, 0.125 mg, Sublingual, TID    isosorbide mononitrate ER (Imdur) 24 hr tab 30 mg, 30 mg, Oral, Daily    lamoTRIgine (LaMICtal) tab 150 mg, 150 mg, Oral, Daily    amphetamine-dextroamphetamine (Adderall) tab  10 mg, 10 mg, Oral, BID AC    losartan (Cozaar) tab 100 mg, 100 mg, Oral, Daily    sevelamer carbonate (Renvela) tab 800 mg, 800 mg, Oral, TID CC    umeclidinium bromide (Incruse Ellipta) 62.5 MCG/ACT inhaler 1 puff, 1 puff, Inhalation, Daily    Medications Prior to Admission   Medication Sig Dispense Refill Last Dose    cloNIDine 0.1 MG Oral Tab Take 1 tablet (0.1 mg total) by mouth daily with food.   5/14/2024    hyoscyamine sulfate 0.125 MG Oral Tablet Dispersible Place 1 tablet (0.125 mg total) under the tongue in the morning, at noon, and at bedtime. 30 tablet 0 5/13/2024    docusate sodium 100 MG Oral Cap Take 1 capsule (100 mg total) by mouth 2 (two) times daily as needed for constipation. 30 capsule 0 5/14/2024    ARIPiprazole 5 MG Oral Tab Take 1 tablet (5 mg total) by mouth daily.   5/14/2024    docusate sodium 100 MG Oral Cap Take 1 capsule (100 mg total) by mouth 2 (two) times daily as needed for constipation. 60 capsule 0 5/14/2024    dicyclomine 10 MG Oral Cap Take 1 capsule (10 mg total) by mouth 3 (three) times daily as needed. 20 capsule 0 5/14/2024    isosorbide mononitrate ER 30 MG Oral Tablet 24 Hr Take 1 tablet (30 mg total) by mouth daily. Hold if systolic blood pressure <130   5/14/2024    Lisdexamfetamine Dimesylate 60 MG Oral Cap Take 1 capsule (60 mg total) by mouth every morning.   5/14/2024    buPROPion  MG Oral Tablet 24 Hr Take 1 tablet (150 mg total) by mouth daily.   5/14/2024    bisacodyl 5 MG Oral Tab EC Take 3 tablets (15 mg total) by mouth daily. 30 tablet 0 5/14/2024    polyethylene glycol, PEG 3350, 17 g Oral Powd Pack Take 17 g by mouth in the morning and 17 g before bedtime. 60 packet 0 5/13/2024    lamoTRIgine (LAMICTAL) 150 MG Oral Tab Take 1 tablet (150 mg total) by mouth daily. 7 tablet 0 5/14/2024    FLUoxetine HCl 40 MG Oral Cap Take 1 capsule (40 mg total) by mouth daily. 7 capsule 0 5/14/2024    RENVELA 800 MG Oral Tab Take 1 tablet (800 mg total) by mouth 3  (three) times daily with meals.   2024    losartan 100 MG Oral Tab Take 1 tablet (100 mg total) by mouth daily. Hold if systolic blood pressure <130   2024    hydrALAZINE 50 MG Oral Tab Take 1 tablet (50 mg total) by mouth in the morning and 1 tablet (50 mg total) before bedtime. Hold if systolic blood pressure <130. 30 tablet 0 2024    NIFEdipine ER 60 MG Oral Tablet 24 Hr Take 1 tablet (60 mg total) by mouth in the morning and 1 tablet (60 mg total) before bedtime. Hold if systolic blood pressure <130.   2024    carvedilol 25 MG Oral Tab Take 1 tablet (25 mg total) by mouth in the morning and 1 tablet (25 mg total) in the evening. Take with meals. 60 tablet 6 2024    Fenofibrate 134 MG Oral Cap Take 1 capsule by mouth nightly. 90 capsule 1 2024    Fluticasone Propionate 50 MCG/ACT Nasal Suspension SPRAY ONCE INTO EACH NOSTRIL BID PRN 15.8 mL 0 2024    HYDROcodone-acetaminophen 5-325 MG Oral Tab Take 2 tablets by mouth every 4 (four) hours as needed. 20 tablet 0     Tiotropium Bromide Monohydrate (SPIRIVA HANDIHALER) 18 MCG Inhalation Cap 1 capsule (18 mcg total) daily.       albuterol 108 (90 Base) MCG/ACT Inhalation Aero Soln albuterol sulfate HFA 90 mcg/actuation aerosol inhaler   INHALE 1 TO 2 PUFFS BY MOUTH EVERY 4-6 HOURS AS NEEDED FOR COUGH OR WHEEZING           PHYSICAL EXAM:     Vital Signs: BP (!) 138/92 (BP Location: Right arm)   Pulse 82   Temp 98.2 °F (36.8 °C) (Oral)   Resp 18   Ht 6' 2\" (1.88 m)   Wt 238 lb (108 kg)   SpO2 92%   BMI 30.56 kg/m²   Temp (24hrs), Av.4 °F (36.9 °C), Min:98.1 °F (36.7 °C), Max:98.7 °F (37.1 °C)       Intake/Output Summary (Last 24 hours) at 5/15/2024 1436  Last data filed at 5/15/2024 1023  Gross per 24 hour   Intake --   Output 600 ml   Net -600 ml     Wt Readings from Last 3 Encounters:   24 238 lb (108 kg)   24 238 lb (108 kg)   24 250 lb (113.4 kg)       General: no acute distress  HEENT: normocephalic,  atraumatic  CV: RRR  Respiratory: no respiratory distress  Abdomen: soft  Extremities: no edema bilaterally  Skin: no rashes on visible skin  Neuro: alert and oriented x3    LABORATORY DATA:     Lab Results   Component Value Date    GLU 87 05/15/2024    BUN 61 (H) 05/15/2024    BUNCREA 13.0 03/07/2022    CREATSERUM 9.40 (H) 05/15/2024    ANIONGAP 11 05/15/2024    GFR 59 (L) 01/04/2018    GFRNAA 30 (L) 07/12/2022    GFRAA 35 (L) 07/12/2022    CA 8.1 (L) 05/15/2024    OSMOCALC 305 (H) 05/15/2024    ALKPHO 99 05/14/2024    AST 31 05/14/2024    ALT 18 05/14/2024    BILT 0.5 05/14/2024    TP 7.2 05/14/2024    ALB 3.3 (L) 05/14/2024    GLOBULIN 3.9 05/14/2024     05/15/2024    K 4.1 05/15/2024     05/15/2024    CO2 16.0 (L) 05/15/2024     Lab Results   Component Value Date    WBC 6.8 05/15/2024    RBC 2.92 (L) 05/15/2024    HGB 9.3 (L) 05/15/2024    HCT 27.9 (L) 05/15/2024    .0 05/15/2024    MPV 11.5 12/14/2012    MCV 95.5 05/15/2024    MCH 31.8 05/15/2024    MCHC 33.3 05/15/2024    RDW 12.6 05/15/2024    NEPRELIM 4.20 05/15/2024    NEPERCENT 61.9 05/15/2024    LYPERCENT 17.9 05/15/2024    MOPERCENT 14.7 05/15/2024    EOPERCENT 4.4 05/15/2024    BAPERCENT 1.0 05/15/2024    NE 4.20 05/15/2024    LYMABS 1.22 05/15/2024    MOABSO 1.00 05/15/2024    EOABSO 0.30 05/15/2024    BAABSO 0.07 05/15/2024     Lab Results   Component Value Date    MALBP 167.00 05/24/2023    CREUR 142.00 05/24/2023    CREUR 142.00 05/24/2023     Lab Results   Component Value Date    COLORUR Yellow 01/03/2024    CLARITY Clear 01/03/2024    SPECGRAVITY 1.025 01/03/2024    GLUUR Negative 01/03/2024    BILUR Negative 01/03/2024    KETUR Trace (A) 01/03/2024    BLOODURINE Negative 01/03/2024    PHURINE 5.5 01/03/2024    PROUR >=300 (A) 01/03/2024    UROBILINOGEN 0.2 01/03/2024    NITRITE Negative 01/03/2024    LEUUR Negative 01/03/2024    WBCUR 6-10 (A) 01/03/2024    RBCUR 0-2 01/03/2024    EPIUR Few (A) 01/03/2024    BACUR Rare (A)  01/03/2024    CAOXUR Occasional (A) 04/20/2018    HYLUR Present (A) 05/14/2019       IMAGING:     Reviewed    ASSESSMENT:      # ESRD on HD  -TTS schedule outpatient  -[facility]     # HTN/Volume Status  -Hypertensive urgency     # Metabolic acidosis    # Anemia     # Secondary Hyperparathyroidism     PLAN:      -HD today, will resume TTS schedule tomorrow  -UF with HD as tolerated  -Continue home BP medications  -Avoid IV iron in setting of infection, will consider EPO with HD  -Continue sevelamer, will check phosphorus levels  -Avoid nephrotoxins and renally dose medications for creatinine clearance  -Monitor intake and output daily  -Daily weights                We will continue to follow.    Thank you for allowing us to participate in the care of this patient.         DO Kevin Hallman [facility] - Nephrology

## 2024-05-15 NOTE — ED QUICK NOTES
EMS crew questioning patient's hypertension, wanted to speak with the MD before transferring the patient. Patient cleared for transfer by Dr. Tai, EMS will take patient over to [place] for transport.

## 2024-05-15 NOTE — PAYOR COMM NOTE
--------------  ADMISSION REVIEW     Payor: [employer]  Subscriber #:  [id number]  Authorization Number: [id number]    Admit date: 5/14/24  Admit time:  8:35 PM       Patient Seen in: [facility] [place]    History     Stated Complaint: missed dialysis yesterday, now with difficulty swallowing and perla    44-year-old male who presents here to the emergency department complaining of having cough, shortness of breath, left-sided tongue pain and throat discomfort.  Reviewing his records he was seen here yesterday on 5/13/2024 for the similar complaint and diagnosed with viral syndrome.  He says that he has had continued pain in his throat.  No fevers but he was sweating all night.  No difficulty handling his secretions.  He said he missed his dialysis yesterday though he is usually scheduled for Tuesdays Thursdays and Saturdays he did have a scheduled dialysis on Monday that he missed.  He said he is noticed some rattling in his chest on the right side.  He had a cough that been nonproductive.    Objective:   Past Medical History:    Asthma ([facility])    Attention deficit hyperactivity disorder (ADHD)    Back problem    Bipolar 1 disorder ([facility])    Cancer ([facility])    CKD (chronic kidney disease) stage 3, GFR 30-59 ml/min ([facility])    Dr Meeks    Congestive heart disease ([facility])    COPD (chronic obstructive pulmonary disease) ([facility])    Coronary atherosclerosis    Deep vein thrombosis ([facility])    at age 17 R/T cast    Depression    Diabetes ([facility])    Essential hypertension    3/21 echo: severe concentric LVH with normal EF and no MR or pHTN    Extrinsic asthma, unspecified    Heart attack ([facility])    2016- angiogram- no intervention    Heart valve disease    mitral valve repair in 1994/    High blood pressure    High cholesterol    History of mitral valve repair    Hyperlipidemia    Low back pain    tight and stiff after sweeping and mopping    LVH (left ventricular hypertrophy)    Migraines    Mixed  hyperlipidemia     HDL 38 LDL 97 VLDL 57     Monoclonal gammopathy    IgG kappa     MVP (mitral valve prolapse)    Repair 1994 at [facility]; echoes as recently as 3/21 show mild or trivial MR and no stenosis    Neuropathy    Osteoarthritis    hip ,knees    Pneumonia due to organism    Pulmonary embolism (HCC)    Renal disorder    TIA (transient ischemic attack)    Initial history of left-sided weakness and slurred speech. (+) cocaine. MRI of the brain, CT angiogram of the head and neck, and 2D echo are all unremarkable.     TMJ (dislocation of temporomandibular joint)    Troponin level elevated    Trop 60 60 47 with TIA and no CP: Lexiscan negative with EF 51     Past Surgical History:   Procedure Laterality Date    Colonoscopy N/A 03/26/2023    Procedure: COLONOSCOPY;  Surgeon: Heath Vu MD;  Location:  ENDOSCOPY    Colonoscopy N/A 12/30/2023    Procedure: COLONOSCOPY with cold snare polypectomy and forcep polypectomy;  Surgeon: Ousmane Suarez MD;  Location:  ENDOSCOPY    Colonoscopy & polypectomy  2019    Egd  2019    Duodenitis. Biopsied. EUS for weight loss was negative    Heart surgery      Hernia surgery  08/17/2022    Dr Barnes    Laminectomy,>2 sgmt,lumbar  09/06/2018    L4-L5 Decomp Discectomy ROEM L4-L5    Mitralplasty w cp bypass  1994    [facility]: Repair    Repair rotator cuff,chronic Left     torn and had a ruptured bicep    Valve repair  1994    mitral valve     Physical Exam     ED Triage Vitals   BP 05/14/24 1232 (!) 188/107   Pulse 05/14/24 1230 107   Resp 05/14/24 1230 20   Temp 05/14/24 1230 98.8 °F (37.1 °C)   Temp src 05/14/24 1230 Temporal   SpO2 05/14/24 1230 97 %   O2 Device 05/14/24 1230 None (Room air)     Current Vitals:   Vital Signs  BP: (!) 173/111  Pulse: 102  Resp: 24  Temp: 98.6 °F (37 °C)  Temp src: Oral    Physical Exam  General: This a pleasant but ill-appearing 44-year-old male who has a slight hot potato voice.  No drooling.    HEENT: Pupils are equal reactive  to light.  Extra ocular motions are intact.  No scleral icterus or conjunctival pallor.  Posterior pharynx with no erythema uvula is midline there is no swelling of the posterior pharynx.  No exudates.  Tongue is midline.  Mucosa slightly dry.  He has some pain on palpation along the angle of the jaw without swelling as well as in the submandibular area.  Tympanic membrane's are intact and clear bilaterally.  No bulging.  No stridorous breathing.    Lungs: Rhonchi and rales noted in the right lung base.    Cardiac: Heart rate of 110 normal S1 and 2 without murmurs or ectopy appreciated  Abdomen: Soft on examination without tenderness to deep palpation or to percussion.  No masses appreciated.  Bowel sounds are normoactive.  No CVA tenderness.  Extremities: No cyanosis, no edema or clubbing.  Pulses are +2.  Full range of motion is noted of the extremities without deformities.  No tenderness.  Neurologically intact.     Labs Reviewed   COMP METABOLIC PANEL (14) - Abnormal; Notable for the following components:       Result Value    Glucose 104 (*)     CO2 19.0 (*)     BUN 67 (*)     Creatinine 10.20 (*)     Calcium, Total 8.2 (*)     Calculated Osmolality 304 (*)     eGFR-Cr 6 (*)     Albumin 3.3 (*)     A/G Ratio 0.8 (*)     All other components within normal limits   CBC W/ DIFFERENTIAL - Abnormal; Notable for the following components:    RBC 2.93 (*)     HGB 9.4 (*)     HCT 27.1 (*)     Monocyte Absolute 1.05 (*)     All other components within normal limits   LACTIC ACID, PLASMA - Normal   BLOOD CULTURE   BLOOD CULTURE     XR CHEST AP PORTABLE   1. New mixed interstitial and airspace infiltrates in the right lung could reflect pneumonia in the proper clinical setting.  Aspiration pneumonitis is also possible.    2. Cardiomegaly.  Status post heart valve surgery.  Mild vascular congestion.  3. Stable small right pleural effusion.        XR SOFT TISSUE NECK    Vague lucency seen in the submandibular soft tissues  which could reflect either edema or gas.  Correlate with clinical exam and CT recommended for further evaluation if indicated clinically.        CT SOFT TISSUE OF NECK      1. No findings to correlate with patient's dysphagia.  2. Ground-glass opacities within the right upper lobe suggestive of pneumonia although underlying edema cannot be excluded.         MDM    Differential diagnosis includes but is not limited to pneumonia, pharyngitis, sepsis from infectious source such as pneumonia, fluid overload, electrolyte abnormality.  The patient's hemoglobin is 9.4 hematocrit 27.1.  Lactic acid 0.9.  The patient is not septic.  Glucose of 104 CO2 of 19 BUN of 67 creatinine of 10.2.  Calcium of 8.2.    I reviewed the x-rays and agree with the radiologist report that showed  Chest x-ray showed new mixed interstitial and airspace infiltrates in the right lung that could reflect pneumonia in the proper clinical setting.  Aspiration pneumonitis is also possible etiology.  Cardiomegaly status post heart valve surgery.  Mild vascular congestion.  Small stable right pleural effusion.    X-ray of the soft tissue of the neck showed vague lucency seen in the submandibular soft tissues which could either reflect either edema or gas.  A CT scan was then performed.  CT scan did not show any findings to correlate with the patient's dysphagia.  Groundglass opacities within the right upper lobe of the lung suggestive of pneumonia.    I explained to the patient I did not have any specific etiology to account for his dysphagia and glottic sensation.  He did however have pneumonia and he had missed his dialysis.  [facility] and will speak to the renal service to arrange the patient's dialysis.  He received ceftriaxone and azithromycin intravenously.  He will be admitted to the hospital for continued care.  He will need transfer via ambulance for continued oxygen and cardiac  monitoring.    Disposition and Plan     Clinical Impression:  1. Community acquired pneumonia of right lower lobe of lung    2. Acute renal failure with acute renal cortical necrosis superimposed on stage 4 chronic kidney disease (HCC)       Hospital Problems       Present on Admission  Date Reviewed: 5/13/2024            ICD-10-CM Noted POA    * (Principal) Community acquired pneumonia of right lower lobe of lung J18.9 5/14/2024 Unknown           5/15:    History and Physical      Chief Complaint:   Difficulty breathing, missed HD     History of Present Illness: Godwin Fonseca is a 44 year old male with PMH significant for ESRD on HD, bipolar, depression, DM 2, HTN, DL, CAD, COPD, HFpEF, chronic abdominal pain presents to the ED w/ missed HD sessions and sob w/ cough and left sided tongue pain/throat discomfort. Pt was diagnosed with viral syndrome yest but states his throat is very painful. No fevers or chills. He noticed rattling in his chest and a prod cough. In the ED cxr interstitial airspace infiltrates in right lung, cw PNA. CT was done of neck due to pts c/o dysphagia - no abn detected. He was started on iv abx and admitte for further care and management.        Physical Exam:    BP (!) 200/118   Pulse 107   Temp 98.4 °F (36.9 °C) (Oral)   Resp 18   Ht 6' 2\" (1.88 m)   Wt 238 lb (108 kg)   SpO2 92%   BMI 30.56 kg/m²   General: No acute distress. Alert and oriented x 3.  HEENT: Normocephalic atraumatic. Moist mucous membranes. EOM-I. PERRLA. Anicteric.  Neck: No lymphadenopathy. No JVD. No carotid bruits.  Respiratory: Clear to auscultation bilaterally. No wheezes. No rhonchi.  Cardiovascular: S1, S2. Regular rate and rhythm. No murmurs, rubs or gallops. Equal pulses.   Chest and Back: No tenderness or deformity.  Abdomen: Soft, nontender, nondistended.  Positive bowel sounds. No rebound, guarding or organomegaly.  Neurologic: No focal neurological deficits. CNII-XII grossly intact.  Musculoskeletal:  Moves all extremities.  Extremities: No edema or cyanosis.  Integument: No rashes or lesions.   Psychiatric: Appropriate mood and affect.      Lab 05/09/24  0644 05/14/24  1332 05/15/24  0707   WBC 5.3 8.3 6.8   HGB 10.2* 9.4* 9.3*   MCV 92.8 92.5 95.5   .0 191.0 175.0       ASSESSMENT / PLAN:   Godwin Fonseca is a 44 year old male with PMH significant for ESRD on HD, bipolar, depression, DM 2, HTN, DL, CAD, COPD, HFpEF, chronic abdominal pain presents to the ED w/ missed HD sessions and sob w/ cough and left sided tongue pain/throat discomfort.     Comm acquired pneumonia  -cxr reviewed  -iv abx  -supportive care  -monitor O2 sats  -expanded viral panel ordered  -rapid strep ordered  -cepacol  Chloraseptic spray     Dysphagia   -likely related to throat pain and viral syndrome  -CT neck without abscess  -NPO for now  -Formal ST eval     Anemia  -chronic  -hx of GI bleed     HTN  DL  CAD  HFpEF  -resume home meds     DM2  -hold po meds  -ISS +accuchecks     ESRD on HD  -HD T/TH/Sa  -nephrology consulted     COPD  -no evidence of acute exacerbation  -resume home inhalers     MDD  Bipolar   -resume home meds     Chronic pain  -Follows with pain clinic outpatient  -Resume home pain medication.      RENAL:    Godwin Fonseca is a 44 year old male with a medical history notable for ESRD on HD TTS who presents with pneumonia. Nephrology consulted for ESRD management.     Missed his dialysis session last Saturday and Tuesday. Has not had dialysis since last Thursday, now about 6 days prior. Denies any shortness of breath, but does have a cough with sore throat. CXR concern for pneumonia. Nephrology consulted for HD management.    ASSESSMENT:      # ESRD on HD  -TTS schedule outpatient  -[facility]  - [facility]     # HTN/Volume Status  -Hypertensive urgency     # Metabolic acidosis     # Anemia     # Secondary Hyperparathyroidism     PLAN:      -HD today, will resume TTS schedule tomorrow  -UF with HD as  tolerated  -Continue home BP medications  -Avoid IV iron in setting of infection, will consider EPO with HD  -Continue sevelamer, will check phosphorus levels  -Avoid nephrotoxins and renally dose medications for creatinine clearance  -Monitor intake and output daily  -Daily weights        MEDICATIONS ADMINISTERED IN LAST 1 DAY:  azithromycin (Zithromax) 500 mg in sodium chloride 0.9% 250 mL IVPB       Date Action Dose Route User    5/14/2024 1608 New Bag 500 mg Intravenous Brianda Mcnally RN          cefTRIAXone (Rocephin) 1 g in D5W 100 mL IVPB-ADD       Date Action Dose Route User    5/14/2024 1534 New Bag 1 g Intravenous Brianda Mcanlly RN          heparin (Porcine) 5000 UNIT/ML injection 5,000 Units       Date Action Dose Route User    5/15/2024 0452 Given 5,000 Units Subcutaneous (Left Lower Abdomen) Veronica Cool RN    5/14/2024 2131 Given 5,000 Units Subcutaneous (Left Lower Abdomen) Veronica Cool RN          hydrALAzine (Apresoline) 20 mg/mL injection 10 mg       Date Action Dose Route User    5/15/2024 0451 Given 10 mg Intravenous Veronica Cool RN    5/14/2024 2217 Given 10 mg Intravenous Veronica Cool RN          HYDROmorphone (Dilaudid) 1 MG/ML injection 0.8 mg       Date Action Dose Route User    5/15/2024 0634 Given 0.8 mg Intravenous Veronica Cool RN    5/15/2024 0231 Given 0.8 mg Intravenous Veronica Cool RN    5/14/2024 2329 Given 0.8 mg Intravenous Veronica Cool RN    5/14/2024 2130 Given 0.8 mg Intravenous Veronica Cool RN          labetalol (Trandate) 5 mg/mL injection 10 mg       Date Action Dose Route User    5/15/2024 0812 Given 10 mg Intravenous Mary Lou Mooyd RN          ondansetron (Zofran) 4 MG/2ML injection 4 mg       Date Action Dose Route User    5/14/2024 2154 Given 4 mg Intravenous Veronica Cool RN          sodium chloride 0.9% infusion       Date Action Dose Route User    5/14/2024 2139 New Bag (none) Intravenous Veronica Cool RN             Vitals (last day)       Date/Time Temp Pulse Resp BP SpO2 Weight O2 Device O2 Flow Rate (L/min) [facility]    05/15/24 0754 98.2 °F (36.8 °C) -- 20 -- -- -- None (Room air) -- TL    05/15/24 0754 -- 107 -- 204/135 -- -- -- -- SC    05/15/24 0545 -- 107 -- 200/118 -- -- -- -- TC    05/15/24 0440 98.4 °F (36.9 °C) 109 18 181/115 92 % -- None (Room air) 0 L/min MA    05/15/24 0028 98.1 °F (36.7 °C) 107 18 193/101 90 % -- None (Room air) -- TC    05/14/24 2255 -- 109 -- 189/117 96 % -- -- -- TC    05/14/24 2144 -- -- -- -- -- 238 lb -- -- TC    05/14/24 2141 -- 106 -- 208/127 91 % -- -- -- TC    05/14/24 2025 98.7 °F (37.1 °C) 103 20 192/121 98 % -- None (Room air) 0 L/min MA    05/14/24 1934 -- 79 22 181/125 98 % -- None (Room air) -- EB    05/14/24 1752 98.6 °F (37 °C) 106 24 162/106 -- -- -- -- CM    05/14/24 1721 -- 106 22 162/108 97 % -- None (Room air) -- CM    05/14/24 1608 -- 102 24 157/100 95 % -- None (Room air) -- CM    05/14/24 1500 98.6 °F (37 °C) -- -- -- -- -- -- -- RS    05/14/24 1443 -- 108 28 173/111 94 % -- None (Room air) -- DL    05/14/24 1347 -- -- -- -- -- -- None (Room air) -- CM    05/14/24 1232 -- -- -- 188/107 -- -- -- -- AB    05/14/24 1230 98.8 °F (37.1 °C) 107 20 -- 97 % 238 lb None (Room air) -- AB

## 2024-05-15 NOTE — H&P
[facility]  HOSPITALIST  History and Physical     Godwin Fonseca Patient Status:  Inpatient    1980 MRN [id number]   Location [facility] 5NW-A Attending Ria Gutierrez MD   Hosp Day # 1 PCP Prieto Novak MD     Chief Complaint:   Difficulty breathing, missed HD    History of Present Illness: Godwin Fonseca is a 44 year old male with PMH significant for ESRD on HD, bipolar, depression, DM 2, HTN, DL, CAD, COPD, HFpEF, chronic abdominal pain presents to the ED w/ missed HD sessions and sob w/ cough and left sided tongue pain/throat discomfort. Pt was diagnosed with viral syndrome yest but states his throat is very painful. No fevers or chills. He noticed rattling in his chest and a prod cough. In the ED cxr interstitial airspace infiltrates in right lung, cw PNA. CT was done of neck due to pts c/o dysphagia - no abn detected. He was started on iv abx and admitte for further care and management.     Past Medical History:  Past Medical History:    Asthma (HCC)    Attention deficit hyperactivity disorder (ADHD)    Back problem    Bipolar 1 disorder (HCC)    CKD (chronic kidney disease) stage 3, GFR 30-59 ml/min (HCC)    Dr Meeks    Congestive heart disease (HCC)    COPD (chronic obstructive pulmonary disease) (HCC)    Coronary atherosclerosis    Deep vein thrombosis (HCC)    at age 17 R/T cast    Depression    Diabetes (HCC)    Essential hypertension    3/21 echo: severe concentric LVH with normal EF and no MR or pHTN    Extrinsic asthma, unspecified    Heart attack (HCC)    - angiogram- no intervention    Heart valve disease    mitral valve repair in /    High blood pressure    High cholesterol    History of mitral valve repair    Hyperlipidemia    Low back pain    tight and stiff after sweeping and mopping    LVH (left ventricular hypertrophy)    Migraines    Mixed hyperlipidemia     HDL 38 LDL 97 VLDL 57     Monoclonal gammopathy    IgG kappa     MVP (mitral valve prolapse)    Repair   at [facility]; echoes as recently as 3/21 show mild or trivial MR and no stenosis    Neuropathy    Osteoarthritis    hip ,knees    Pneumonia due to organism    Pulmonary embolism (HCC)    Renal disorder    Stroke (HCC)    TIA (transient ischemic attack)    Initial history of left-sided weakness and slurred speech. (+) cocaine. MRI of the brain, CT angiogram of the head and neck, and 2D echo are all unremarkable.     TMJ (dislocation of temporomandibular joint)    Troponin level elevated    Trop 60 60 47 with TIA and no CP: Lexiscan negative with EF 51        Past Surgical History:   Past Surgical History:   Procedure Laterality Date    Colonoscopy N/A 03/26/2023    Procedure: COLONOSCOPY;  Surgeon: Heath Vu MD;  Location:  ENDOSCOPY    Colonoscopy N/A 12/30/2023    Procedure: COLONOSCOPY with cold snare polypectomy and forcep polypectomy;  Surgeon: Ousmane Suarez MD;  Location:  ENDOSCOPY    Colonoscopy & polypectomy  2019    Egd  2019    Duodenitis. Biopsied. EUS for weight loss was negative    Heart surgery      Hernia surgery  08/17/2022    Dr Barnes    Laminectomy,>2 sgmt,lumbar  09/06/2018    L4-L5 Decomp Discectomy ROEM L4-L5    Mitralplasty w cp bypass  1994    [facility]: Repair    Repair rotator cuff,chronic Left     torn and had a ruptured bicep    Valve repair  1994    mitral valve       Social History:  reports that he quit smoking about 2 years ago. His smoking use included cigarettes. He started smoking about 29 years ago. He has a 27 pack-year smoking history. He has never used smokeless tobacco. He reports that he does not drink alcohol and does not use drugs.    Family History:   Family History   Problem Relation Age of Onset    Hypertension Father     Alcohol and Other Disorders Associated Father     Substance Abuse Father         cocaine    Dementia Father     Cancer Father         lung    Diabetes Mother     Cancer Mother         multiple myeloma    Hypertension Mother     Anxiety Maternal  Aunt     Depression Maternal Aunt     Anxiety Maternal Aunt     Depression Maternal Aunt     Bipolar Disorder Maternal Aunt     Diabetes Maternal Grandmother     Hypertension Maternal Grandmother     Cancer Maternal Grandfather         stomach cancer    Diabetes Maternal Grandfather     Hypertension Maternal Grandfather     Alcohol and Other Disorders Associated Maternal Grandfather     Hypertension Paternal Grandmother     Hypertension Paternal Grandfather     Cancer Sister         uterine and ovarian    Hypertension Sister     Cancer Maternal Uncle         lung    Cancer Paternal Aunt         throat        Allergies:   Allergies   Allergen Reactions    Hydrochlorothiazide RASH and HIVES       Medications:    Current Facility-Administered Medications on File Prior to Encounter   Medication Dose Route Frequency Provider Last Rate Last Admin    [COMPLETED] ondansetron (Zofran) 4 MG/2ML injection 4 mg  4 mg Intravenous Once MelvinGavi MD   4 mg at 05/05/24 0517    [COMPLETED] labetalol (Trandate) 5 mg/mL injection 20 mg  20 mg Intravenous Once MelvinGavi MD   20 mg at 05/05/24 0519    [COMPLETED] hydrALAzine (Apresoline) 20 mg/mL injection 20 mg  20 mg Intravenous Once MelvinGavi rashid MD   20 mg at 05/05/24 0820    [COMPLETED] sodium chloride 0.9 % IV bolus 1,000 mL  1,000 mL Intravenous Once Francia, Mary Lou, DO   Stopped at 04/22/24 2337    [COMPLETED] HYDROmorphone (Dilaudid) 1 MG/ML injection 1 mg  1 mg Intravenous Once Francia, Mary Lou, DO   1 mg at 04/22/24 2225    [COMPLETED] ondansetron (Zofran) 4 MG/2ML injection 4 mg  4 mg Intravenous Once Francia, Mary Lou, DO   4 mg at 04/22/24 2226    [COMPLETED] iopamidol 76% (ISOVUE-370) injection for power injector  100 mL Intravenous ONCE PRN Francia, Mary Lou, DO   100 mL at 04/22/24 2253    [COMPLETED] cloNIDine (Catapres) tab 0.1 mg  0.1 mg Oral Once Francia, Mary Lou, DO   0.1 mg at 04/22/24 2320    [COMPLETED] dicyclomine (Bentyl) cap 10 mg  10 mg Oral  TID PRN Bakari Noguera MD   10 mg at 24 0944    [COMPLETED] ketorolac (Toradol) 15 MG/ML injection 15 mg  15 mg Intravenous Once Stacy Shane MD   15 mg at 24 1455    [COMPLETED] labetalol (Trandate) 5 mg/mL injection 20 mg  20 mg Intravenous Once Stacy Shane MD   20 mg at 24 1534    [COMPLETED] morphINE PF 4 MG/ML injection 4 mg  4 mg Intravenous Once Stacy Shane MD   4 mg at 24 1659    [COMPLETED] hydrALAzine (Apresoline) 20 mg/mL injection 20 mg  20 mg Intravenous Once Stacy Shane MD   20 mg at 24 1717    [COMPLETED] ondansetron (Zofran) 4 MG/2ML injection 4 mg  4 mg Intravenous Once Livia Marx MD   4 mg at 24 0749    [COMPLETED] pantoprazole (Protonix) 40 mg in sodium chloride 0.9% PF 10 mL IV push  40 mg Intravenous Once Livia Marx MD   40 mg at 24 0749    [] sodium chloride 0.9 % IV bolus 100 mL  100 mL Intravenous Q30 Min PRN Lenka Bond MD        And    [] albumin human (Albumin) 25% injection 25 g  25 g Intravenous PRN Dialysis Lenka Bond MD        [COMPLETED] iopamidol 76% (ISOVUE-370) injection for power injector  100 mL Intravenous ONCE PRN Ria Gutierrez MD   100 mL at 24 1659    [COMPLETED] hydrALAzine (Apresoline) 20 mg/mL injection 5 mg  5 mg Intravenous Once Gaam Ray, DO   5 mg at 24 0109    [COMPLETED] cloNIDine (Catapres) tab 0.1 mg  0.1 mg Oral Once Gama Ray, DO   0.1 mg at 24 0209    [COMPLETED] HYDROmorphone (Dilaudid) 1 MG/ML injection 0.5 mg  0.5 mg Intravenous Once Gama Ray, DO   0.5 mg at 24 0236    [COMPLETED] hydrALAzine (Apresoline) 20 mg/mL injection 10 mg  10 mg Intravenous Once Gama Ray DO   10 mg at 24 0316    [COMPLETED] HYDROmorphone (Dilaudid) 1 MG/ML injection 1 mg  1 mg Intravenous Once Gama Ray DO   1 mg at 24 0348    [COMPLETED] HYDROmorphone (Dilaudid) 1 MG/ML injection 1 mg  1 mg Intravenous Once Terry Lew MD   1 mg at 24 1320     [COMPLETED] labetalol (Trandate) 5 mg/mL injection 20 mg  20 mg Intravenous Once Terry Lew MD   20 mg at 03/08/24 1320    [COMPLETED] hydrALAzine (Apresoline) 20 mg/mL injection 20 mg  20 mg Intravenous Once Terry Lew MD   20 mg at 03/08/24 1417    [COMPLETED] labetalol (Trandate) 5 mg/mL injection 40 mg  40 mg Intravenous Once Terry Lew MD   40 mg at 03/08/24 1449    [COMPLETED] heparin (Porcine) 1000 UNIT/ML injection 1,500 Units  1.5 mL Intracatheter Once Samaria Nava MD   1,500 Units at 03/08/24 2100     Current Outpatient Medications on File Prior to Encounter   Medication Sig Dispense Refill    cloNIDine 0.1 MG Oral Tab Take 1 tablet (0.1 mg total) by mouth daily with food.      hyoscyamine sulfate 0.125 MG Oral Tablet Dispersible Place 1 tablet (0.125 mg total) under the tongue in the morning, at noon, and at bedtime. 30 tablet 0    docusate sodium 100 MG Oral Cap Take 1 capsule (100 mg total) by mouth 2 (two) times daily as needed for constipation. 30 capsule 0    ARIPiprazole 5 MG Oral Tab Take 1 tablet (5 mg total) by mouth daily.      docusate sodium 100 MG Oral Cap Take 1 capsule (100 mg total) by mouth 2 (two) times daily as needed for constipation. 60 capsule 0    dicyclomine 10 MG Oral Cap Take 1 capsule (10 mg total) by mouth 3 (three) times daily as needed. 20 capsule 0    isosorbide mononitrate ER 30 MG Oral Tablet 24 Hr Take 1 tablet (30 mg total) by mouth daily. Hold if systolic blood pressure <130      Lisdexamfetamine Dimesylate 60 MG Oral Cap Take 1 capsule (60 mg total) by mouth every morning.      buPROPion  MG Oral Tablet 24 Hr Take 1 tablet (150 mg total) by mouth daily.      bisacodyl 5 MG Oral Tab EC Take 3 tablets (15 mg total) by mouth daily. 30 tablet 0    polyethylene glycol, PEG 3350, 17 g Oral Powd Pack Take 17 g by mouth in the morning and 17 g before bedtime. 60 packet 0    lamoTRIgine (LAMICTAL) 150 MG Oral Tab Take 1 tablet (150 mg total) by  mouth daily. 7 tablet 0    FLUoxetine HCl 40 MG Oral Cap Take 1 capsule (40 mg total) by mouth daily. 7 capsule 0    RENVELA 800 MG Oral Tab Take 1 tablet (800 mg total) by mouth 3 (three) times daily with meals.      losartan 100 MG Oral Tab Take 1 tablet (100 mg total) by mouth daily. Hold if systolic blood pressure <130      hydrALAZINE 50 MG Oral Tab Take 1 tablet (50 mg total) by mouth in the morning and 1 tablet (50 mg total) before bedtime. Hold if systolic blood pressure <130. 30 tablet 0    NIFEdipine ER 60 MG Oral Tablet 24 Hr Take 1 tablet (60 mg total) by mouth in the morning and 1 tablet (60 mg total) before bedtime. Hold if systolic blood pressure <130.      carvedilol 25 MG Oral Tab Take 1 tablet (25 mg total) by mouth in the morning and 1 tablet (25 mg total) in the evening. Take with meals. 60 tablet 6    Fenofibrate 134 MG Oral Cap Take 1 capsule by mouth nightly. 90 capsule 1    Fluticasone Propionate 50 MCG/ACT Nasal Suspension SPRAY ONCE INTO EACH NOSTRIL BID PRN 15.8 mL 0    HYDROcodone-acetaminophen 5-325 MG Oral Tab Take 2 tablets by mouth every 4 (four) hours as needed. 20 tablet 0    Tiotropium Bromide Monohydrate (SPIRIVA HANDIHALER) 18 MCG Inhalation Cap 1 capsule (18 mcg total) daily.      albuterol 108 (90 Base) MCG/ACT Inhalation Aero Soln albuterol sulfate HFA 90 mcg/actuation aerosol inhaler   INHALE 1 TO 2 PUFFS BY MOUTH EVERY 4-6 HOURS AS NEEDED FOR COUGH OR WHEEZING         Review of Systems:   A comprehensive 14 point review of systems was completed.    Pertinent positives and negatives noted in the HPI.    Physical Exam:    BP (!) 200/118   Pulse 107   Temp 98.4 °F (36.9 °C) (Oral)   Resp 18   Ht 6' 2\" (1.88 m)   Wt 238 lb (108 kg)   SpO2 92%   BMI 30.56 kg/m²   General: No acute distress. Alert and oriented x 3.  HEENT: Normocephalic atraumatic. Moist mucous membranes. EOM-I. PERRLA. Anicteric.  Neck: No lymphadenopathy. No JVD. No carotid bruits.  Respiratory: Clear to  auscultation bilaterally. No wheezes. No rhonchi.  Cardiovascular: S1, S2. Regular rate and rhythm. No murmurs, rubs or gallops. Equal pulses.   Chest and Back: No tenderness or deformity.  Abdomen: Soft, nontender, nondistended.  Positive bowel sounds. No rebound, guarding or organomegaly.  Neurologic: No focal neurological deficits. CNII-XII grossly intact.  Musculoskeletal: Moves all extremities.  Extremities: No edema or cyanosis.  Integument: No rashes or lesions.   Psychiatric: Appropriate mood and affect.      Diagnostic Data:      Labs:  Recent Labs   Lab 05/09/24  0644 05/14/24  1332 05/15/24  0707   WBC 5.3 8.3 6.8   HGB 10.2* 9.4* 9.3*   MCV 92.8 92.5 95.5   .0 191.0 175.0       Recent Labs   Lab 05/09/24 0644 05/14/24  1332   GLU 87 104*   BUN 50* 67*   CREATSERUM 9.58* 10.20*   CA 8.6 8.2*   ALB 3.6 3.3*    137   K 4.2 4.7    110   CO2 23.0 19.0*   ALKPHO  --  99   AST  --  31   ALT  --  18   BILT  --  0.5   TP  --  7.2       Estimated Creatinine Clearance: 10.5 mL/min (A) (based on SCr of 10.2 mg/dL (H)).    No results for input(s): \"PTP\", \"INR\" in the last 168 hours.    COVID-19 Lab Results    COVID-19  Lab Results   Component Value Date    COVID19 Not Detected 03/16/2024    COVID19 Not Detected 03/09/2024    COVID19 Not Detected 03/08/2024       Pro-Calcitonin  No results for input(s): \"PCT\" in the last 168 hours.    Cardiac  No results for input(s): \"TROP\", \"PBNP\" in the last 168 hours.    Creatinine Kinase  No results for input(s): \"CK\" in the last 168 hours.    Inflammatory Markers  No results for input(s): \"CRP\", \"HARJEET\", \"LDH\", \"DDIMER\" in the last 168 hours.    No results for input(s): \"TROP\", \"TROPHS\", \"CK\" in the last 168 hours.    Imaging: Imaging data reviewed in Epic.      ASSESSMENT / PLAN:   Godwin Fonseca is a 44 year old male with PMH significant for ESRD on HD, bipolar, depression, DM 2, HTN, DL, CAD, COPD, HFpEF, chronic abdominal pain presents to the ED w/ missed HD  sessions and sob w/ cough and left sided tongue pain/throat discomfort.    Comm acquired pneumonia  -cxr reviewed  -iv abx  -supportive care  -monitor O2 sats  -expanded viral panel ordered  -rapid strep ordered  -cepacol  Chloraseptic spray    Dysphagia   -likely related to throat pain and viral syndrome  -CT neck without abscess  -NPO for now  -Formal ST eval    Anemia  -chronic  -hx of GI bleed    HTN  DL  CAD  HFpEF  -resume home meds     DM2  -hold po meds  -ISS +accuchecks     ESRD on HD  -HD T/TH/Sa  -nephrology consulted     COPD  -no evidence of acute exacerbation  -resume home inhalers     MDD  Bipolar   -resume home meds     Chronic pain  -Follows with pain clinic outpatient  -Resume home pain medication.      Quality:  DVT Prophylaxis: heparin  CODE status: full code  Abbott: no  If COVID testing is negative, may discontinue isolation: yes     Plan of care discussed with pt and rn and all questions answered.        Ria Gutierrez MD  [facility] Hospitalist  Pager [phone]  Answering Service number: [phone]              **Certification      PHYSICIAN Certification of Need for Inpatient Hospitalization - Initial Certification    Patient will require inpatient services that will reasonably be expected to span two midnight's based on the clinical documentation in H+P.   Based on patients current state of illness, I anticipate that, after discharge, patient will require TBD.

## 2024-05-15 NOTE — DIETARY NOTE
[facility]   part of [facility]  NUTRITION ASSESSMENT    Pt does not meet malnutrition criteria at this time.    NUTRITION INTERVENTION:    Meal and Snacks - Continue Pureed Renal diet as tolerated; monitor patient po intake. Encourage adequate po of appropriate diet.  Medical Food Supplements - RD added Nepro vanilla BID. Rationale/use for oral supplements discussed.  Vitamin and Mineral Supplements - Recommend adding Dialyvite  Coordination of Nutrition Care - Recommend SLP consult prior to diet advancement.    PATIENT STATUS: 44 year old male admitted on 5/14 presents with cough, SOB, L sided tongue and roof of mouth pain and throat discomfort. Pt screened d/t MST score 2. Visited pt at bedside. Pt reports decreased appetite and early satiety over the past 2-3 months. Reports having nausea every other day and diarrhea 1-2x/week with last BM yesterday (not taking any meds to help with either). Reports his UBW was ~260 lbs and currently weighs 238 lbs. SLP evaluated d/t failed bedside swallow eval and recommends pureed solids and thin liquids. Pt reports drinking vanilla Ensure daily at home and agreeable to receiving BID during admit. All questions answered at this time.    PMH: Asthma, ADHD, Bipolar 1 disorder, Congestive heart disease, COPD, Coronary atherosclerosis, Deep vein thrombosis, Depression, Diabetes, Essential hypertension, Heart attack, Hyperlipidemia, Migraines, Neuropathy, Osteoarthritis, Pneumonia due to organism, Pulmonary embolism, Renal disorder, Stroke    ANTHROPOMETRICS:  Ht: 188 cm (6' 2\")  Wt: 108 kg (238 lb).   BMI: Body mass index is 30.56 kg/m².  IBW: 86.4 kg    WEIGHT HISTORY: Per chart, pt with ~19 lb wt loss x 6 months (7%, not significant per standards).  Patient Weight(s) for the past 336 hrs:   Weight   05/14/24 2144 108 kg (238 lb)   05/14/24 1230 108 kg (238 lb)       Wt Readings from Last 20 Encounters:   05/14/24 108 kg (238 lb)   05/13/24 108 kg (238 lb)   05/13/24  113.4 kg (250 lb)   05/09/24 113.4 kg (250 lb)   04/22/24 122 kg (269 lb)   04/02/24 108.9 kg (240 lb)   03/23/24 104 kg (229 lb 4.5 oz)   03/16/24 111.1 kg (245 lb)   03/10/24 112 kg (246 lb 14.6 oz)   01/05/24 113.4 kg (250 lb)   01/03/24 113.4 kg (250 lb)   12/28/23 115.6 kg (254 lb 13.6 oz)   11/24/23 114.8 kg (253 lb 1.6 oz)   11/14/23 116.9 kg (257 lb 12.8 oz)   10/19/23 117.9 kg (260 lb)   08/25/23 115 kg (253 lb 8.5 oz)   07/27/23 115.5 kg (254 lb 11.2 oz)   05/25/23 120.9 kg (266 lb 8.6 oz)   05/17/23 115.7 kg (255 lb)   05/09/23 118.4 kg (261 lb)        NUTRITION:  Diet:       Procedures    Regular/General diet Calorie Restriction/Carb Controlled: 1800 kcal/60 grams; Fluid Consistency: Thin Liquids ; Texture Consistency: Pureed; Is Patient on Accuchecks? Yes; Misc Restriction: Renal        Percent Meals Eaten (last 3 days)       None            Food Allergies: No  Cultural/Ethnic/Roman [employer] Preferences Addressed: Yes    GI SYSTEM REVIEW: diarrhea and nausea; last BM 5/14 per pt  Skin/Wounds: WNL    NUTRITION RELATED PHYSICAL FINDINGS:     1. Body Fat/Muscle Mass: no wasting noted     2. Fluid Accumulation: none per RN documentation     NUTRITION PRESCRIPTION: 86.4 kg IBW  Calories: 5486-3063 calories/day (25-30 kcal/kg)  Protein: 130-173 grams protein/day (1.5-2.0 gm/kg)  Fluid: ~1 ml/kcal or per MD discretion    NUTRITION DIAGNOSIS/PROBLEM:  Inadequate oral intake related to insufficient appetite resulting in inadequate nutrition intake as evidenced by documented/reported insufficient oral intake and documented/reported unintentional weight loss    MONITOR AND EVALUATE/NUTRITION GOALS:  PO intake of 75% of meals TID - New  PO intake of 75% of oral nutrition supplement/s - New  Weight stable within 1 to 2 lbs during admission - New      MEDICATIONS:  Abx, novolog, renvela    LABS:  Reviewed     Pt is at Moderate nutrition risk    Deanna Portillo RD, LDN, CNSC  Clinical Dietitian  Spectra: [id number]

## 2024-05-15 NOTE — PLAN OF CARE
Pt is A&Ox4. Wears glasses. BP elevated- MD notified, PRN given. Afebrile. SPO2 maintained on RA. Tele, ST. Heparin. Last BM 5/14. NPO- failed bedside swallow eval. Pt feels like his tongue is swollen, chokes on liquid. SLP to eval. HD- TThS. Permacath in right subclavian. C/o neck/ear/jaw pain- PRN given. PIV, IVF. No further needs at this time, continue POC. Safety precautions in place.     Problem: Patient/Family Goals  Goal: Patient/Family Long Term Goal  Description: Patient's Long Term Goal: discharge back home    Interventions:  - follow and update POC  - See additional Care Plan goals for specific interventions  Outcome: Progressing  Goal: Patient/Family Short Term Goal  Description: Patient's Short Term Goal:   5/14 NOC: sleep    Interventions:   - follow and update POC  - See additional Care Plan goals for specific interventions  Outcome: Progressing

## 2024-05-15 NOTE — PROGRESS NOTES
NURSING ADMISSION NOTE      Patient admitted via Cart  Oriented to room.  Safety precautions initiated.  Bed in low position.  Call light in reach.  Admitted to room 511    Pt arrived alert and oriented x4, on RA. Admission navigator, med rec, and flowsheets complete. Educated pt on POC, verbalizes understanding, no further questions.

## 2024-05-15 NOTE — CM/SW NOTE
05/15/24 1300   CM/SW Referral Data   Referral Source    Reason for Referral Discharge planning;Readmission   Informant Patient   Readmission Assessment   Factors that patient feels contributed to this readmission Acute/Chronic Clinical Presentation   Was full assessment completed by SW/CM on prior admission? No (comment)   Patient Status Prior to Admission   Services in place prior to admission Dialysis   Dialysis Clinic [facility]   Scheduled Dialysis days T-TH-SAT   Discharge Needs   Anticipated D/C needs No anticipated discharge needs     CM self referred to case for discharge planning and readmission.    Pt is a 44 year old male who presented to  to ER with shortness of breath, throat discomfort had missed his HD appointment yesterday.  Noted he presented to ER the day before with same complaints .  Now admitted as he missed HD due to not feeling well.  Pt with frequent admissions and ED visits with c/o abdominal pain and rectal bleeding, hypertension, missed HD visits,     Met w/pt at the bedside and he confirmed he has no discharge needs.  He drives himself to HD appointments.     / to remain available for support and/or discharge planning.     Heike HANDA MSN, RN CTL/  [phone]

## 2024-05-16 LAB
DEPRECATED HBV CORE AB SER IA-ACNC: 527.8 NG/ML
GLUCOSE BLD-MCNC: 103 MG/DL (ref 70–99)
GLUCOSE BLD-MCNC: 105 MG/DL (ref 70–99)
GLUCOSE BLD-MCNC: 162 MG/DL (ref 70–99)
GLUCOSE BLD-MCNC: 221 MG/DL (ref 70–99)
GLUCOSE BLD-MCNC: 396 MG/DL (ref 70–99)
IRON SATN MFR SERPL: 18 %
IRON SERPL-MCNC: 36 UG/DL
PHOSPHATE SERPL-MCNC: 4.6 MG/DL (ref 2.5–4.9)
TIBC SERPL-MCNC: 203 UG/DL (ref 240–450)
TRANSFERRIN SERPL-MCNC: 136 MG/DL (ref 200–360)

## 2024-05-16 PROCEDURE — 84100 ASSAY OF PHOSPHORUS: CPT | Performed by: STUDENT IN AN ORGANIZED HEALTH CARE EDUCATION/TRAINING PROGRAM

## 2024-05-16 PROCEDURE — 83550 IRON BINDING TEST: CPT | Performed by: STUDENT IN AN ORGANIZED HEALTH CARE EDUCATION/TRAINING PROGRAM

## 2024-05-16 PROCEDURE — 82728 ASSAY OF FERRITIN: CPT | Performed by: STUDENT IN AN ORGANIZED HEALTH CARE EDUCATION/TRAINING PROGRAM

## 2024-05-16 PROCEDURE — 82962 GLUCOSE BLOOD TEST: CPT

## 2024-05-16 PROCEDURE — 90935 HEMODIALYSIS ONE EVALUATION: CPT | Performed by: STUDENT IN AN ORGANIZED HEALTH CARE EDUCATION/TRAINING PROGRAM

## 2024-05-16 PROCEDURE — 83540 ASSAY OF IRON: CPT | Performed by: STUDENT IN AN ORGANIZED HEALTH CARE EDUCATION/TRAINING PROGRAM

## 2024-05-16 RX ORDER — AZITHROMYCIN 250 MG/1
500 TABLET, FILM COATED ORAL
Status: COMPLETED | OUTPATIENT
Start: 2024-05-16 | End: 2024-05-16

## 2024-05-16 RX ORDER — HEPARIN SODIUM 1000 [USP'U]/ML
1.5 INJECTION, SOLUTION INTRAVENOUS; SUBCUTANEOUS
Status: DISCONTINUED | OUTPATIENT
Start: 2024-05-16 | End: 2024-05-17

## 2024-05-16 RX ORDER — HYDROMORPHONE HYDROCHLORIDE 1 MG/ML
0.3 INJECTION, SOLUTION INTRAMUSCULAR; INTRAVENOUS; SUBCUTANEOUS ONCE
Status: COMPLETED | OUTPATIENT
Start: 2024-05-16 | End: 2024-05-16

## 2024-05-16 NOTE — PLAN OF CARE
Pt AOx4. VSS on Ra. Tele. Voids. C/o neck, throat and abdominal pain. See MAR. Up SBA. IV ABX. Qid ACCU CHECKS. Tolerating regular diet. HD today. Updated on poc.         Problem: Patient/Family Goals  Goal: Patient/Family Long Term Goal  Description: Patient's Long Term Goal: discharge back home    Interventions:  - follow and update POC  - See additional Care Plan goals for specific interventions  Outcome: Progressing  Goal: Patient/Family Short Term Goal  Description: Patient's Short Term Goal:   5/14 NOC: sleep  5/16 AM: manage pain  Interventions:   - follow and update POC  - See additional Care Plan goals for specific interventions  Outcome: Progressing

## 2024-05-16 NOTE — PLAN OF CARE
Patient A&O x4. Tele/Pulse ox. NSR. VS stable. Dialysis done during shift. Tolerated well. QID Accu-check. Diet: Pureed/thin liquid. PRN pain meds. PIV Left Hand. IV ABX.   Consults Hosp/Nephro. All questions answered. Patient updated on plan of care     Problem: Patient/Family Goals  Goal: Patient/Family Long Term Goal  Description: Patient's Long Term Goal: discharge back home    Interventions:  - follow and update POC  - See additional Care Plan goals for specific interventions  Outcome: Progressing  Goal: Patient/Family Short Term Goal  Description: Patient's Short Term Goal:   5/14 NOC: sleep  5/15 NOC: Rest   Interventions:   - follow and update POC  - See additional Care Plan goals for specific interventions  Outcome: Progressing

## 2024-05-16 NOTE — PROGRESS NOTES
[facility] Nephrology  Inpatient Follow-up    Godwin Fonseca Patient Status:  Inpatient    1980 MRN [id number]   [facility] 5NW-A Attending Ria Gutierrez MD   Hosp Day # 2 PCP Prieto Novak MD       SUBJECTIVE:     Patient seen and examined at bedside. No acute complaints today. HD yday. Plan for HD again today.    MEDICATIONS:     Current Facility-Administered Medications   Medication Dose Route Frequency    heparin (Porcine) 1000 UNIT/ML injection 1,500 Units  1.5 mL Intracatheter PRN Dialysis    HYDROmorphone (Dilaudid) 1 MG/ML injection 0.3 mg  0.3 mg Intravenous Once    hydrALAzine (Apresoline) 20 mg/mL injection 20 mg  20 mg Intravenous Q6H PRN    labetalol (Trandate) 5 mg/mL injection 10 mg  10 mg Intravenous Q6H PRN    benzocaine-menthol (Cepacol) lozenge 1 lozenge  1 lozenge Oral PRN    phenol (Chloraseptic) 1.4 % oral liquid spray   Oral Q2H PRN    multivitamin (Dialyvite - Renal) tab 1 tablet  1 tablet Oral Daily    heparin (Porcine) 5000 UNIT/ML injection 5,000 Units  5,000 Units Subcutaneous Q8H MARTHA    acetaminophen (Tylenol Extra Strength) tab 500 mg  500 mg Oral Q4H PRN    melatonin tab 3 mg  3 mg Oral Nightly PRN    acetaminophen (Tylenol) tab 650 mg  650 mg Oral Q4H PRN    Or    HYDROcodone-acetaminophen (Norco) 5-325 MG per tab 1 tablet  1 tablet Oral Q4H PRN    Or    HYDROcodone-acetaminophen (Norco) 5-325 MG per tab 2 tablet  2 tablet Oral Q4H PRN    benzonatate (Tessalon) cap 200 mg  200 mg Oral TID PRN    guaiFENesin (Robitussin) 100 MG/5 ML oral liquid 200 mg  200 mg Oral Q4H PRN    ondansetron (Zofran) 4 MG/2ML injection 4 mg  4 mg Intravenous Q6H PRN    prochlorperazine (Compazine) 10 MG/2ML injection 5 mg  5 mg Intravenous Q8H PRN    polyethylene glycol (PEG 3350) (Miralax) 17 g oral packet 17 g  17 g Oral Daily PRN    sennosides (Senokot) tab 17.2 mg  17.2 mg Oral Nightly PRN    bisacodyl (Dulcolax) 10 MG rectal suppository 10 mg  10 mg Rectal Daily PRN     glucose (Dex4) 15 GM/59ML oral liquid 15 g  15 g Oral Q15 Min PRN    Or    glucose (Glutose) 40% oral gel 15 g  15 g Oral Q15 Min PRN    Or    glucose-vitamin C (Dex-4) chewable tab 4 tablet  4 tablet Oral Q15 Min PRN    Or    dextrose 50% injection 50 mL  50 mL Intravenous Q15 Min PRN    Or    glucose (Dex4) 15 GM/59ML oral liquid 30 g  30 g Oral Q15 Min PRN    Or    glucose (Glutose) 40% oral gel 30 g  30 g Oral Q15 Min PRN    Or    glucose-vitamin C (Dex-4) chewable tab 8 tablet  8 tablet Oral Q15 Min PRN    insulin aspart (NovoLOG) 100 Units/mL FlexPen 1-10 Units  1-10 Units Subcutaneous TID AC and HS    azithromycin (Zithromax) 500 mg in sodium chloride 0.9% 250 mL IVPB  500 mg Intravenous Q24H    cefTRIAXone (Rocephin) 2 g in D5W 100 mL IVPB-ADD  2 g Intravenous Q24H    ARIPiprazole (Abilify) tab 5 mg  5 mg Oral Daily    buPROPion ER (Wellbutrin XL) 24 hr tab 150 mg  150 mg Oral Daily    carvedilol (Coreg) tab 25 mg  25 mg Oral BID with meals    cloNIDine (Catapres) tab 0.1 mg  0.1 mg Oral Daily with food    fenofibrate micronized (Lofibra) cap 134 mg  134 mg Oral Nightly    FLUoxetine (PROzac) cap 40 mg  40 mg Oral Daily    fluticasone propionate (Flonase) 50 MCG/ACT nasal suspension 1 spray  1 spray Each Nare Daily    hydrALAZINE (Apresoline) tab 50 mg  50 mg Oral BID    hyoscyamine sulfate (LEVSIN) disintegrating tab 0.125 mg  0.125 mg Sublingual TID    isosorbide mononitrate ER (Imdur) 24 hr tab 30 mg  30 mg Oral Daily    lamoTRIgine (LaMICtal) tab 150 mg  150 mg Oral Daily    amphetamine-dextroamphetamine (Adderall) tab 10 mg  10 mg Oral BID AC    losartan (Cozaar) tab 100 mg  100 mg Oral Daily    sevelamer carbonate (Renvela) tab 800 mg  800 mg Oral TID CC    umeclidinium bromide (Incruse Ellipta) 62.5 MCG/ACT inhaler 1 puff  1 puff Inhalation Daily       PHYSICAL EXAM:     Vital Signs: /71 (BP Location: Right arm)   Pulse 95   Temp 98.3 °F (36.8 °C) (Oral)   Resp 20   Ht 6' 2\" (1.88 m)    Wt 238 lb (108 kg)   SpO2 92%   BMI 30.56 kg/m²   Temp (24hrs), Av.4 °F (36.9 °C), Min:98.2 °F (36.8 °C), Max:98.7 °F (37.1 °C)       Intake/Output Summary (Last 24 hours) at 2024 1406  Last data filed at 2024 0900  Gross per 24 hour   Intake --   Output 600 ml   Net -600 ml     Wt Readings from Last 3 Encounters:   24 238 lb (108 kg)   24 238 lb (108 kg)   24 250 lb (113.4 kg)       General: no acute distress  HEENT: normocephalic, atraumatic  CV: RRR  Respiratory: no distress  Abdomen: soft, non-tender  Extremities: no edema bilaterally  Skin: no rashes on visible skin  Neuro: alert and oriented x3     LABORATORY DATA:     Lab Results   Component Value Date    GLU 87 05/15/2024    BUN 61 (H) 05/15/2024    BUNCREA 13.0 2022    CREATSERUM 9.40 (H) 05/15/2024    ANIONGAP 11 05/15/2024    GFR 59 (L) 2018    GFRNAA 30 (L) 2022    GFRAA 35 (L) 2022    CA 8.1 (L) 05/15/2024    OSMOCALC 305 (H) 05/15/2024    ALKPHO 99 2024    AST 31 2024    ALT 18 2024    BILT 0.5 2024    TP 7.2 2024    ALB 3.3 (L) 2024    GLOBULIN 3.9 2024     05/15/2024    K 4.1 05/15/2024     05/15/2024    CO2 16.0 (L) 05/15/2024     Lab Results   Component Value Date    WBC 6.8 05/15/2024    RBC 2.92 (L) 05/15/2024    HGB 9.3 (L) 05/15/2024    HCT 27.9 (L) 05/15/2024    .0 05/15/2024    MPV 11.5 2012    MCV 95.5 05/15/2024    MCH 31.8 05/15/2024    MCHC 33.3 05/15/2024    RDW 12.6 05/15/2024    NEPRELIM 4.20 05/15/2024    NEPERCENT 61.9 05/15/2024    LYPERCENT 17.9 05/15/2024    MOPERCENT 14.7 05/15/2024    EOPERCENT 4.4 05/15/2024    BAPERCENT 1.0 05/15/2024    NE 4.20 05/15/2024    LYMABS 1.22 05/15/2024    MOABSO 1.00 05/15/2024    EOABSO 0.30 05/15/2024    BAABSO 0.07 05/15/2024     Lab Results   Component Value Date    MALBP 167.00 2023    CREUR 142.00 2023    CREUR 142.00 2023     Lab Results   Component  Value Date    COLORUR Yellow 01/03/2024    CLARITY Clear 01/03/2024    SPECGRAVITY 1.025 01/03/2024    GLUUR Negative 01/03/2024    BILUR Negative 01/03/2024    KETUR Trace (A) 01/03/2024    BLOODURINE Negative 01/03/2024    PHURINE 5.5 01/03/2024    PROUR >=300 (A) 01/03/2024    UROBILINOGEN 0.2 01/03/2024    NITRITE Negative 01/03/2024    LEUUR Negative 01/03/2024    WBCUR 6-10 (A) 01/03/2024    RBCUR 0-2 01/03/2024    EPIUR Few (A) 01/03/2024    BACUR Rare (A) 01/03/2024    CAOXUR Occasional (A) 04/20/2018    HYLUR Present (A) 05/14/2019       IMAGING:     Reviewed    ASSESSMENT:      # ESRD on HD  -TTS schedule outpatient  -[facility]     # HTN/Volume Status  -Hypertensive urgency     # Metabolic acidosis     # Anemia     # Secondary Hyperparathyroidism     PLAN:      -HD per TTS schedule  -UF with HD as tolerated  -Continue home BP medications  -Avoid IV iron in setting of infection, will consider EPO with HD  -Continue sevelamer  -Avoid nephrotoxins and renally dose medications for creatinine clearance  -Monitor intake and output daily  -Daily weights     We will continue to follow.    Thank you for allowing us to participate in the care of this patient.       DO Kevin Hallman [facility] - Nephrology

## 2024-05-16 NOTE — PROGRESS NOTES
[facility]   part of [facility] Hospitalist Progress Note     Godwin Fonseca Patient Status:  Inpatient    1980 MRN [id number]   Location [facility] 5NW-A Attending Ria Gutierrez MD   Hosp Day # 2 PCP Prieto Novak MD     Chief Complaint:   Fu: Difficulty breathing, missed HD    Subjective:     Patient seen and examined.  Pain is better today  Less tongue pain and swelling  Having some pain at back of head  Afebrile  Plan for HD today       Objective:    Review of Systems:   10 point ROS completed and was negative, except for pertinent positive and negatives stated in subjective.    Vital signs:  Temp:  [98.2 °F (36.8 °C)-98.7 °F (37.1 °C)] 98.7 °F (37.1 °C)  Pulse:  [] 95  Resp:  [18-20] 18  BP: (138-169)/() 148/27  SpO2:  [92 %-97 %] 92 %    Physical Exam:    General: No acute distress.   HEENT:  EOMI, PERRLA, OP clear  Respiratory: Clear to auscultation bilaterally. No wheezes. No rhonchi.  Cardiovascular: S1, S2. Regular rate and rhythm. No murmurs.  Abdomen: Soft, nontender, nondistended.  Positive bowel sounds. No rebound or guarding.  Extremities: No edema.  Neuro:  Grossly non focal, no motor deficits.        Diagnostic Data:    Labs:  Recent Labs   Lab 24  1332 05/15/24  0707   WBC 8.3 6.8   HGB 9.4* 9.3*   MCV 92.5 95.5   .0 175.0       Recent Labs   Lab 24  1332 05/15/24  0707   * 87   BUN 67* 61*   CREATSERUM 10.20* 9.40*   CA 8.2* 8.1*   ALB 3.3*  --     139   K 4.7 4.1    112   CO2 19.0* 16.0*   ALKPHO 99  --    AST 31  --    ALT 18  --    BILT 0.5  --    TP 7.2  --        Estimated Creatinine Clearance: 11.4 mL/min (A) (based on SCr of 9.4 mg/dL (H)).    No results for input(s): \"PTP\", \"INR\" in the last 168 hours.         COVID-19 Lab Results    COVID-19  Lab Results   Component Value Date    COVID19 Not Detected 05/15/2024    COVID19 Not Detected 2024    COVID19 Not Detected 2024        Pro-Calcitonin  No results for input(s): \"PCT\" in the last 168 hours.    Cardiac  No results for input(s): \"TROP\", \"PBNP\" in the last 168 hours.    Creatinine Kinase  No results for input(s): \"CK\" in the last 168 hours.    Inflammatory Markers  No results for input(s): \"CRP\", \"HARJEET\", \"LDH\", \"DDIMER\" in the last 168 hours.    Imaging: Imaging data reviewed in Epic.    Medications:    multivitamin  1 tablet Oral Daily    heparin  5,000 Units Subcutaneous Q8H AMRTHA    insulin aspart  1-10 Units Subcutaneous TID AC and HS    azithromycin (Zithromax) 500 mg in sodium chloride 0.9% 250 mL IVPB  500 mg Intravenous Q24H    cefTRIAXone  2 g Intravenous Q24H    ARIPiprazole  5 mg Oral Daily    buPROPion ER  150 mg Oral Daily    carvedilol  25 mg Oral BID with meals    cloNIDine  0.1 mg Oral Daily with food    fenofibrate micronized  134 mg Oral Nightly    FLUoxetine HCl  40 mg Oral Daily    fluticasone propionate  1 spray Each Nare Daily    hydrALAZINE  50 mg Oral BID    hyoscyamine sulfate  0.125 mg Sublingual TID    isosorbide mononitrate ER  30 mg Oral Daily    lamoTRIgine  150 mg Oral Daily    amphetamine-dextroamphetamine  10 mg Oral BID AC    losartan  100 mg Oral Daily    sevelamer carbonate  800 mg Oral TID CC    umeclidinium bromide  1 puff Inhalation Daily       Assessment & Plan:    Godwin Fonseca is a 44 year old male with PMH significant for ESRD on HD, bipolar, depression, DM 2, HTN, DL, CAD, COPD, HFpEF, chronic abdominal pain presents to the ED w/ missed HD sessions and sob w/ cough and left sided tongue pain/throat discomfort.     Comm acquired pneumonia  -cxr reviewed  -iv abx >> will convert to po when pt oral pain better  -supportive care  -monitor O2 sats  -expanded viral panel NEG  -rapid strep NEG  -cepacol  -Chloraseptic spray     Dysphagia   -likely related to throat pain and viral syndrome  -CT neck without abscess  -CLD >> adv as tolerated   -Formal ST eval >> rec pureed diet for now      Anemia  -chronic  -hx of GI bleed     HTN  DL  CAD  HFpEF  -resume home meds     DM2  -hold po meds  -ISS +accuchecks     ESRD on HD  -HD T/TH/Sa  -nephrology consulted     COPD  -no evidence of acute exacerbation  -resume home inhalers     MDD  Bipolar   -resume home meds     Chronic pain  Possible drug use dependance  -Follows with pain clinic outpatient  -Resume home pain medication.  -iv pain meds stopped        Quality:  DVT Prophylaxis: heparin  CODE status: full code  Abbott: no  If COVID testing is negative, may discontinue isolation: yes      DISPO:  Dc planning in process  All iv pain meds stopped  Pt has relationship/contract with outpt pain doctor  No pain meds will be prescribed on discharge  Likely dc tomorrow if stable overnight    Plan of care discussed with pt and rn and all questions answered.           Ria Domínguezy Hospitalist  Pager [phone]  Answering Service number: [phone]

## 2024-05-17 VITALS
TEMPERATURE: 98 F | OXYGEN SATURATION: 93 % | RESPIRATION RATE: 18 BRPM | BODY MASS INDEX: 30.54 KG/M2 | HEIGHT: 74 IN | HEART RATE: 105 BPM | WEIGHT: 238 LBS | SYSTOLIC BLOOD PRESSURE: 132 MMHG | DIASTOLIC BLOOD PRESSURE: 88 MMHG

## 2024-05-17 LAB
GLUCOSE BLD-MCNC: 117 MG/DL (ref 70–99)
GLUCOSE BLD-MCNC: 155 MG/DL (ref 70–99)

## 2024-05-17 PROCEDURE — 92526 ORAL FUNCTION THERAPY: CPT

## 2024-05-17 PROCEDURE — 82962 GLUCOSE BLOOD TEST: CPT

## 2024-05-17 RX ORDER — AMOXICILLIN AND CLAVULANATE POTASSIUM 500; 125 MG/1; MG/1
1 TABLET, FILM COATED ORAL EVERY MORNING
Qty: 7 TABLET | Refills: 0 | Status: SHIPPED | OUTPATIENT
Start: 2024-05-17 | End: 2024-05-24

## 2024-05-17 RX ORDER — DICYCLOMINE HYDROCHLORIDE 10 MG/1
10 CAPSULE ORAL
Status: DISCONTINUED | OUTPATIENT
Start: 2024-05-17 | End: 2024-05-17

## 2024-05-17 RX ORDER — BENZONATATE 200 MG/1
200 CAPSULE ORAL 3 TIMES DAILY PRN
Qty: 20 CAPSULE | Refills: 0 | Status: SHIPPED | OUTPATIENT
Start: 2024-05-17

## 2024-05-17 NOTE — PROGRESS NOTES
NURSING DISCHARGE NOTE    Discharged Home via Wheelchair.  Accompanied by Support staff  Belongings Taken by patient/family.      Discharge instructions explained to patient at bedside. IV removed. Permacath c/d/I. Patient's belongings taken by patient. No further questions at this time.

## 2024-05-17 NOTE — SLP NOTE
SPEECH DAILY NOTE - INPATIENT    ASSESSMENT & PLAN   ASSESSMENT  Pt seen for dysphagia tx to assess tolerance with recommended diet, ensure appropriate utilization of aspiration precautions and provide pt/family education. Pt found lying down in bed sleeping; easily awakened and participated well in therapeutic tasks. Pt's diet advanced to regular with thin liquids and patient reported good tolerance with meals. Head of bed elevated to 90 degrees and pt observed feeding himself thin liquids via straw and grapes. Independent utilization of aspiration precautions noted and no overt clinical s/s of aspiration observed. Reviewed and reiterated importance of aspiration precautions with good understanding reported. Will d/c from services at this time.     Diet Recommendations - Solids: Regular  Diet Recommendations - Liquids: Thin Liquids    Compensatory Strategies Recommended: No straws;Slow rate;Alternate consistencies;Small bites and sips;Extra sauce/gravy  Aspiration Precautions: Upright position;Slow rate;Small bites and sips;No straw  Medication Administration Recommendations: Whole in puree;Crushed in puree    Patient Experiencing Pain: No  Pain Rating: 10  Pain Location: back L side of tongue and upper roof of mouth  Pain Control: No medications (awaiting results of swallow evaluation to administer po meds)  Nursing Aware of Pain?: Yes    Treatment Plan  Treatment Plan/Recommendations: Dysphagia therapy;Aspiration precautions             GOALS  Goal #1 The patient will tolerate pureed consistency and thin/small, single sips liquids without overt signs or symptoms of aspiration with 95 % accuracy over 2 session(s). Met   Goal #2 The patient/family/caregiver will demonstrate understanding and implementation of aspiration precautions and swallow strategies independently over 2 session(s).    Met   Goal #3 The patient will utilize compensatory strategies as outlined by  BSSE (clinical evaluation) including Slow rate,  Small bites, Small sips, Alternate liquids/solids, Upright 90 degrees with as needed assistance 100 % of the time across 2 sessions. Met             FOLLOW UP  Follow Up Needed (Documentation Required): No  SLP Follow-up Date: 05/16/24  Number of Visits to Meet Established Goals: 2    Session: 1    If you have any questions, please contact JUN Mccloud MA, CCC-SLP  Pager [phone]

## 2024-05-17 NOTE — PROGRESS NOTES
[facility]   part of [facility] Hospitalist Progress Note     Godwin Fonseca Patient Status:  Inpatient    1980 MRN [id number]   Location [facility] 5NW-A Attending Ria Gutierrez MD   Hosp Day # 3 PCP Prieto Novak MD     Chief Complaint:   Fu: Difficulty breathing, missed HD    Subjective:     Patient seen and examined.  Pain is better today  Says he had a lot of pain during HD  Less tongue pain and swelling  Afebrile         Objective:    Review of Systems:   10 point ROS completed and was negative, except for pertinent positive and negatives stated in subjective.    Vital signs:  Temp:  [98.2 °F (36.8 °C)-98.7 °F (37.1 °C)] 98.7 °F (37.1 °C)  Pulse:  [] 105  Resp:  [18-20] 18  BP: ()/() 132/88  SpO2:  [93 %-100 %] 93 %    Physical Exam:    General: No acute distress.   HEENT:  EOMI, PERRLA, OP clear  Respiratory: Clear to auscultation bilaterally. No wheezes. No rhonchi.  Cardiovascular: S1, S2. Regular rate and rhythm. No murmurs.  Abdomen: Soft, nontender, nondistended.  Positive bowel sounds. No rebound or guarding.  Extremities: No edema.  Neuro:  Grossly non focal, no motor deficits.        Diagnostic Data:    Labs:  Recent Labs   Lab 24  1332 05/15/24  0707   WBC 8.3 6.8   HGB 9.4* 9.3*   MCV 92.5 95.5   .0 175.0       Recent Labs   Lab 24  1332 05/15/24  0707   * 87   BUN 67* 61*   CREATSERUM 10.20* 9.40*   CA 8.2* 8.1*   ALB 3.3*  --     139   K 4.7 4.1    112   CO2 19.0* 16.0*   ALKPHO 99  --    AST 31  --    ALT 18  --    BILT 0.5  --    TP 7.2  --        Estimated Creatinine Clearance: 11.4 mL/min (A) (based on SCr of 9.4 mg/dL (H)).    No results for input(s): \"PTP\", \"INR\" in the last 168 hours.         COVID-19 Lab Results    COVID-19  Lab Results   Component Value Date    COVID19 Not Detected 05/15/2024    COVID19 Not Detected 2024    COVID19 Not Detected 2024        Pro-Calcitonin  No results for input(s): \"PCT\" in the last 168 hours.    Cardiac  No results for input(s): \"TROP\", \"PBNP\" in the last 168 hours.    Creatinine Kinase  No results for input(s): \"CK\" in the last 168 hours.    Inflammatory Markers  Recent Labs   Lab 05/16/24  0857   HARJEET 527.8*       Imaging: Imaging data reviewed in Epic.    Medications:    multivitamin  1 tablet Oral Daily    heparin  5,000 Units Subcutaneous Q8H MARTHA    insulin aspart  1-10 Units Subcutaneous TID AC and HS    cefTRIAXone  2 g Intravenous Q24H    ARIPiprazole  5 mg Oral Daily    buPROPion ER  150 mg Oral Daily    carvedilol  25 mg Oral BID with meals    cloNIDine  0.1 mg Oral Daily with food    fenofibrate micronized  134 mg Oral Nightly    FLUoxetine HCl  40 mg Oral Daily    fluticasone propionate  1 spray Each Nare Daily    hydrALAZINE  50 mg Oral BID    hyoscyamine sulfate  0.125 mg Sublingual TID    isosorbide mononitrate ER  30 mg Oral Daily    lamoTRIgine  150 mg Oral Daily    amphetamine-dextroamphetamine  10 mg Oral BID AC    losartan  100 mg Oral Daily    sevelamer carbonate  800 mg Oral TID CC    umeclidinium bromide  1 puff Inhalation Daily       Assessment & Plan:    Godwin Fonseca is a 44 year old male with PMH significant for ESRD on HD, bipolar, depression, DM 2, HTN, DL, CAD, COPD, HFpEF, chronic abdominal pain presents to the ED w/ missed HD sessions and sob w/ cough and left sided tongue pain/throat discomfort.     Comm acquired pneumonia  -cxr reviewed  -iv abx >> will convert to po when pt oral pain better  -supportive care  -monitor O2 sats  -expanded viral panel NEG  -rapid strep NEG  -cepacol  -Chloraseptic spray     Dysphagia   -likely related to throat pain and viral syndrome  -CT neck without abscess  -CLD >> adv as tolerated   -Formal ST eval >> rec pureed diet for now     Anemia  -chronic  -hx of GI bleed     HTN  DL  CAD  HFpEF  -resume home meds     DM2  -hold po meds  -ISS +accuchecks     ESRD on  HD  -HD T/TH/Sa  -nephrology consulted     COPD  -no evidence of acute exacerbation  -resume home inhalers     MDD  Bipolar   -resume home meds     Chronic pain  Possible drug use dependance  -Follows with pain clinic outpatient  -Resume home pain medication.  -iv pain meds stopped        Quality:  DVT Prophylaxis: heparin  CODE status: full code  Abbott: no  If COVID testing is negative, may discontinue isolation: yes      DISPO:  Dc planning in process  All iv pain meds stopped  Pt has relationship/contract with outpt pain doctor  No pain meds will be prescribed on discharge  Dc today if clinically better  Po abx on dc     Plan of care discussed with pt and rn and all questions answered.           Ria Gutierrez MD  [facility] Hospitalist  Pager [phone]  Answering Service number: [phone]

## 2024-05-17 NOTE — PLAN OF CARE
Pt A&OX4. On RA sating WNL. . On tele. NSR/ST. C/o pain to head. Heat packs provided with relief. PIV SL. QID accuchecks. HD T,TH,SAT. R permacath. Voids. BRP. Up standby. Pt updated on plan of care and verbalized understanding. Call light within reach. All needs mets at this time. Will follow.     Problem: Patient/Family Goals  Goal: Patient/Family Long Term Goal  Description: Patient's Long Term Goal: discharge back home    Interventions:  - follow and update POC  - See additional Care Plan goals for specific interventions  5/16/2024 2248 by Laurel Brian, RN  Outcome: Progressing  5/16/2024 2247 by Laurel Brian, RN  Outcome: Progressing  Goal: Patient/Family Short Term Goal  Description: Patient's Short Term Goal:   5/14 NOC: sleep  5/16 AM: manage pain  5/16 NOC: pain control  Interventions:   - follow and update POC  - See additional Care Plan goals for specific interventions  5/16/2024 2248 by Laurel Brian, RN  Outcome: Progressing  5/16/2024 2247 by Laurel Brian, RN  Outcome: Progressing     Problem: PAIN - ADULT  Goal: Verbalizes/displays adequate comfort level or patient's stated pain goal  Description: INTERVENTIONS:  - Encourage pt to monitor pain and request assistance  - Assess pain using appropriate pain scale  - Administer analgesics based on type and severity of pain and evaluate response  - Implement non-pharmacological measures as appropriate and evaluate response  - Consider cultural and social influences on pain and pain management  - Manage/alleviate anxiety  - Utilize distraction and/or relaxation techniques  - Monitor for opioid side effects  - Notify MD/LIP if interventions unsuccessful or patient reports new pain  - Anticipate increased pain with activity and pre-medicate as appropriate  Outcome: Progressing     Problem: RESPIRATORY - ADULT  Goal: Achieves optimal ventilation and oxygenation  Description: INTERVENTIONS:  - Assess for changes in respiratory status  - Assess for  changes in mentation and behavior  - Position to facilitate oxygenation and minimize respiratory effort  - Oxygen supplementation based on oxygen saturation or ABGs  - Provide Smoking Cessation handout, if applicable  - Encourage broncho-pulmonary hygiene including cough, deep breathe, Incentive Spirometry  - Assess the need for suctioning and perform as needed  - Assess and instruct to report SOB or any respiratory difficulty  - Respiratory Therapy support as indicated  - Manage/alleviate anxiety  - Monitor for signs/symptoms of CO2 retention  Outcome: Progressing

## 2024-05-17 NOTE — PROGRESS NOTES
[facility] Nephrology  Inpatient Follow-up    Godwin Fonseca Patient Status:  Inpatient    1980 MRN [id number]   [facility] 5NW-A Attending Ria Gutierrez MD   Hosp Day # 3 PCP Prieto Novak MD       SUBJECTIVE:     Patient seen and examined at bedside. No acute complaints today.    MEDICATIONS:     Current Facility-Administered Medications   Medication Dose Route Frequency    dicyclomine (Bentyl) cap 10 mg  10 mg Oral TID AC&HS    heparin (Porcine) 1000 UNIT/ML injection 1,500 Units  1.5 mL Intracatheter PRN Dialysis    hydrALAzine (Apresoline) 20 mg/mL injection 20 mg  20 mg Intravenous Q6H PRN    labetalol (Trandate) 5 mg/mL injection 10 mg  10 mg Intravenous Q6H PRN    benzocaine-menthol (Cepacol) lozenge 1 lozenge  1 lozenge Oral PRN    phenol (Chloraseptic) 1.4 % oral liquid spray   Oral Q2H PRN    multivitamin (Dialyvite - Renal) tab 1 tablet  1 tablet Oral Daily    heparin (Porcine) 5000 UNIT/ML injection 5,000 Units  5,000 Units Subcutaneous Q8H MARTHA    acetaminophen (Tylenol Extra Strength) tab 500 mg  500 mg Oral Q4H PRN    melatonin tab 3 mg  3 mg Oral Nightly PRN    acetaminophen (Tylenol) tab 650 mg  650 mg Oral Q4H PRN    Or    HYDROcodone-acetaminophen (Norco) 5-325 MG per tab 1 tablet  1 tablet Oral Q4H PRN    Or    HYDROcodone-acetaminophen (Norco) 5-325 MG per tab 2 tablet  2 tablet Oral Q4H PRN    benzonatate (Tessalon) cap 200 mg  200 mg Oral TID PRN    guaiFENesin (Robitussin) 100 MG/5 ML oral liquid 200 mg  200 mg Oral Q4H PRN    ondansetron (Zofran) 4 MG/2ML injection 4 mg  4 mg Intravenous Q6H PRN    prochlorperazine (Compazine) 10 MG/2ML injection 5 mg  5 mg Intravenous Q8H PRN    polyethylene glycol (PEG 3350) (Miralax) 17 g oral packet 17 g  17 g Oral Daily PRN    sennosides (Senokot) tab 17.2 mg  17.2 mg Oral Nightly PRN    bisacodyl (Dulcolax) 10 MG rectal suppository 10 mg  10 mg Rectal Daily PRN    glucose (Dex4) 15 GM/59ML oral liquid 15 g  15 g Oral  Q15 Min PRN    Or    glucose (Glutose) 40% oral gel 15 g  15 g Oral Q15 Min PRN    Or    glucose-vitamin C (Dex-4) chewable tab 4 tablet  4 tablet Oral Q15 Min PRN    Or    dextrose 50% injection 50 mL  50 mL Intravenous Q15 Min PRN    Or    glucose (Dex4) 15 GM/59ML oral liquid 30 g  30 g Oral Q15 Min PRN    Or    glucose (Glutose) 40% oral gel 30 g  30 g Oral Q15 Min PRN    Or    glucose-vitamin C (Dex-4) chewable tab 8 tablet  8 tablet Oral Q15 Min PRN    insulin aspart (NovoLOG) 100 Units/mL FlexPen 1-10 Units  1-10 Units Subcutaneous TID AC and HS    cefTRIAXone (Rocephin) 2 g in D5W 100 mL IVPB-ADD  2 g Intravenous Q24H    ARIPiprazole (Abilify) tab 5 mg  5 mg Oral Daily    buPROPion ER (Wellbutrin XL) 24 hr tab 150 mg  150 mg Oral Daily    carvedilol (Coreg) tab 25 mg  25 mg Oral BID with meals    cloNIDine (Catapres) tab 0.1 mg  0.1 mg Oral Daily with food    fenofibrate micronized (Lofibra) cap 134 mg  134 mg Oral Nightly    FLUoxetine (PROzac) cap 40 mg  40 mg Oral Daily    fluticasone propionate (Flonase) 50 MCG/ACT nasal suspension 1 spray  1 spray Each Nare Daily    hydrALAZINE (Apresoline) tab 50 mg  50 mg Oral BID    hyoscyamine sulfate (LEVSIN) disintegrating tab 0.125 mg  0.125 mg Sublingual TID    isosorbide mononitrate ER (Imdur) 24 hr tab 30 mg  30 mg Oral Daily    lamoTRIgine (LaMICtal) tab 150 mg  150 mg Oral Daily    amphetamine-dextroamphetamine (Adderall) tab 10 mg  10 mg Oral BID AC    losartan (Cozaar) tab 100 mg  100 mg Oral Daily    sevelamer carbonate (Renvela) tab 800 mg  800 mg Oral TID CC    umeclidinium bromide (Incruse Ellipta) 62.5 MCG/ACT inhaler 1 puff  1 puff Inhalation Daily       PHYSICAL EXAM:     Vital Signs: /88 (BP Location: Right arm)   Pulse 105   Temp 98.3 °F (36.8 °C) (Oral)   Resp 18   Ht 6' 2\" (1.88 m)   Wt 238 lb (108 kg)   SpO2 93%   BMI 30.56 kg/m²   Temp (24hrs), Av.4 °F (36.9 °C), Min:98.2 °F (36.8 °C), Max:98.7 °F (37.1 °C)       Intake/Output  Summary (Last 24 hours) at 5/17/2024 1600  Last data filed at 5/17/2024 1300  Gross per 24 hour   Intake 480 ml   Output --   Net 480 ml     Wt Readings from Last 3 Encounters:   05/14/24 238 lb (108 kg)   05/13/24 238 lb (108 kg)   05/13/24 250 lb (113.4 kg)       General: no acute distress  HEENT: normocephalic, atraumatic  CV: RRR  Respiratory: no distress  Abdomen: soft, non-tender  Extremities: no edema bilaterally  Skin: no rashes on visible skin  Neuro: alert and oriented x3     LABORATORY DATA:     Lab Results   Component Value Date    GLU 87 05/15/2024    BUN 61 (H) 05/15/2024    BUNCREA 13.0 03/07/2022    CREATSERUM 9.40 (H) 05/15/2024    ANIONGAP 11 05/15/2024    GFR 59 (L) 01/04/2018    GFRNAA 30 (L) 07/12/2022    GFRAA 35 (L) 07/12/2022    CA 8.1 (L) 05/15/2024    OSMOCALC 305 (H) 05/15/2024    ALKPHO 99 05/14/2024    AST 31 05/14/2024    ALT 18 05/14/2024    BILT 0.5 05/14/2024    TP 7.2 05/14/2024    ALB 3.3 (L) 05/14/2024    GLOBULIN 3.9 05/14/2024     05/15/2024    K 4.1 05/15/2024     05/15/2024    CO2 16.0 (L) 05/15/2024     Lab Results   Component Value Date    WBC 6.8 05/15/2024    RBC 2.92 (L) 05/15/2024    HGB 9.3 (L) 05/15/2024    HCT 27.9 (L) 05/15/2024    .0 05/15/2024    MPV 11.5 12/14/2012    MCV 95.5 05/15/2024    MCH 31.8 05/15/2024    MCHC 33.3 05/15/2024    RDW 12.6 05/15/2024    NEPRELIM 4.20 05/15/2024    NEPERCENT 61.9 05/15/2024    LYPERCENT 17.9 05/15/2024    MOPERCENT 14.7 05/15/2024    EOPERCENT 4.4 05/15/2024    BAPERCENT 1.0 05/15/2024    NE 4.20 05/15/2024    LYMABS 1.22 05/15/2024    MOABSO 1.00 05/15/2024    EOABSO 0.30 05/15/2024    BAABSO 0.07 05/15/2024     Lab Results   Component Value Date    MALBP 167.00 05/24/2023    CREUR 142.00 05/24/2023    CREUR 142.00 05/24/2023     Lab Results   Component Value Date    COLORUR Yellow 01/03/2024    CLARITY Clear 01/03/2024    SPECGRAVITY 1.025 01/03/2024    GLUUR Negative 01/03/2024    BILUR Negative  01/03/2024    KETUR Trace (A) 01/03/2024    BLOODURINE Negative 01/03/2024    PHURINE 5.5 01/03/2024    PROUR >=300 (A) 01/03/2024    UROBILINOGEN 0.2 01/03/2024    NITRITE Negative 01/03/2024    LEUUR Negative 01/03/2024    WBCUR 6-10 (A) 01/03/2024    RBCUR 0-2 01/03/2024    EPIUR Few (A) 01/03/2024    BACUR Rare (A) 01/03/2024    CAOXUR Occasional (A) 04/20/2018    HYLUR Present (A) 05/14/2019       IMAGING:     Reviewed    ASSESSMENT:      # ESRD on HD  -TTS schedule outpatient  -[facility]     # HTN/Volume Status  -Hypertensive urgency     # Metabolic acidosis     # Anemia     # Secondary Hyperparathyroidism     PLAN:      -HD per TTS schedule  -UF with HD as tolerated  -Continue home BP medications  -Avoid IV iron in setting of infection, will consider EPO with HD  -Continue sevelamer  -Avoid nephrotoxins and renally dose medications for creatinine clearance  -Monitor intake and output daily  -Daily weights     Okay for discharge from nephrology perspective. Plan to go to HD tomorrow at outpatient center.    Thank you for allowing us to participate in the care of this patient.       DO Kevin Hallman [facility] - Nephrology

## 2024-05-20 NOTE — DISCHARGE SUMMARY
[facility]  HOSPITALIST  DISCHARGE SUMMARY     Godwin Fonseca Patient Status:  Inpatient    1980 MRN [id number]   Location [facility] 5NW-A Attending No att. providers found   Hosp Day # 3 PCP Prieto Novak MD     Date of Admission: 2024  Date of Discharge: 2024  Discharge Disposition: Home or Self Care    Discharge Diagnosis:   Comm acquired pneumonia  Dysphagia   Anemia  HTN  DL  CAD  HFpEF  DM2  ESRD on HD  COPD  MDD  Bipolar   Chronic pain  Possible drug use dependance      History of Present Illness:   Godwin Fonseca is a 44 year old male with PMH significant for ESRD on HD, bipolar, depression, DM 2, HTN, DL, CAD, COPD, HFpEF, chronic abdominal pain presents to the ED w/ missed HD sessions and sob w/ cough and left sided tongue pain/throat discomfort. Pt was diagnosed with viral syndrome yest but states his throat is very painful. No fevers or chills. He noticed rattling in his chest and a prod cough. In the ED cxr interstitial airspace infiltrates in right lung, cw PNA. CT was done of neck due to pts c/o dysphagia - no abn detected. He was started on iv abx and admitte for further care and management.     Brief Synopsis:     Comm acquired pneumonia  -cxr reviewed  -iv abx >> will convert to po when pt oral pain better  -supportive care  -monitor O2 sats  -expanded viral panel NEG  -rapid strep NEG  -cepacol  -Chloraseptic spray     Dysphagia   -likely related to throat pain and viral syndrome  -CT neck without abscess  -CLD >> adv as tolerated   -Formal ST eval >> rec pureed diet for now     Anemia  -chronic  -hx of GI bleed     HTN  DL  CAD  HFpEF  -resume home meds     DM2  -hold po meds  -ISS +accuchecks     ESRD on HD  -HD T//  -nephrology consulted     COPD  -no evidence of acute exacerbation  -resume home inhalers     MDD  Bipolar   -resume home meds     Chronic pain  Possible drug use dependance  -Follows with pain clinic outpatient  -Resume home pain medication.  -iv pain meds  stopped      DC INSTRUCTIONS  Dc planning in process  All iv pain meds stopped  Pt has relationship/contract with outpt pain doctor  No pain meds will be prescribed on discharge  Dc today if clinically better  Po abx on dc      Plan of care discussed with pt and rn and all questions answered.          Lace+ Score: 75  59-90 High Risk  29-58 Medium Risk  0-28   Low Risk       TCM Follow-Up Recommendation:  LACE > 58: High Risk of readmission after discharge from the hospital.    Procedures during hospitalization:   none    Incidental or significant findings and recommendations (brief descriptions):  none    Lab/Test results pending at Discharge:   none    Consultants:  renal    Discharge Medication List:     Discharge Medications        START taking these medications        Instructions Prescription details   amoxicillin clavulanate 500-125 MG Tabs  Commonly known as: Augmentin      Take 1 tablet by mouth every morning for 7 days.   Stop taking on: May 24, 2024  Quantity: 7 tablet  Refills: 0     benzonatate 200 MG Caps  Commonly known as: Tessalon      Take 1 capsule (200 mg total) by mouth 3 (three) times daily as needed for cough.   Quantity: 20 capsule  Refills: 0            CONTINUE taking these medications        Instructions Prescription details   ARIPiprazole 5 MG Tabs  Commonly known as: Abilify      Take 1 tablet (5 mg total) by mouth daily.   Refills: 0     bisacodyl 5 MG Tbec  Commonly known as: Dulcolax      Take 3 tablets (15 mg total) by mouth daily.   Quantity: 30 tablet  Refills: 0     buPROPion  MG Tb24  Commonly known as: Wellbutrin XL      Take 1 tablet (150 mg total) by mouth daily.   Refills: 0     carvedilol 25 MG Tabs  Commonly known as: Coreg      Take 1 tablet (25 mg total) by mouth in the morning and 1 tablet (25 mg total) in the evening. Take with meals.   Quantity: 60 tablet  Refills: 6     cloNIDine 0.1 MG Tabs  Commonly known as: Catapres      Take 1 tablet (0.1 mg total) by mouth  daily with food.   Refills: 0     dicyclomine 10 MG Caps  Commonly known as: Bentyl      Take 1 capsule (10 mg total) by mouth 3 (three) times daily as needed.   Quantity: 20 capsule  Refills: 0     docusate sodium 100 MG Caps  Commonly known as: Colace      Take 1 capsule (100 mg total) by mouth 2 (two) times daily as needed for constipation.   Stop taking on: May 22, 2024  Quantity: 60 capsule  Refills: 0     docusate sodium 100 MG Caps  Commonly known as: Colace      Take 1 capsule (100 mg total) by mouth 2 (two) times daily as needed for constipation.   Quantity: 30 capsule  Refills: 0     Fenofibrate 134 MG Caps      Take 1 capsule by mouth nightly.   Quantity: 90 capsule  Refills: 1     FLUoxetine HCl 40 MG Caps  Commonly known as: PROZAC      Take 1 capsule (40 mg total) by mouth daily.   Quantity: 7 capsule  Refills: 0     fluticasone propionate 50 MCG/ACT Susp  Commonly known as: Flonase      SPRAY ONCE INTO EACH NOSTRIL BID PRN   Quantity: 15.8 mL  Refills: 0     hydrALAZINE 50 MG Tabs  Commonly known as: Apresoline      Take 1 tablet (50 mg total) by mouth in the morning and 1 tablet (50 mg total) before bedtime. Hold if systolic blood pressure <130.   Quantity: 30 tablet  Refills: 0     HYDROcodone-acetaminophen 5-325 MG Tabs  Commonly known as: Norco      Take 2 tablets by mouth every 4 (four) hours as needed.   Quantity: 20 tablet  Refills: 0     hyoscyamine sulfate 0.125 MG Tbdp  Commonly known as: LEVSIN      Place 1 tablet (0.125 mg total) under the tongue in the morning, at noon, and at bedtime.   Quantity: 30 tablet  Refills: 0     isosorbide mononitrate ER 30 MG Tb24  Commonly known as: Imdur      Take 1 tablet (30 mg total) by mouth daily. Hold if systolic blood pressure <130   Refills: 0     lamoTRIgine 150 MG Tabs  Commonly known as: LaMICtal      Take 1 tablet (150 mg total) by mouth daily.   Quantity: 7 tablet  Refills: 0     Lisdexamfetamine Dimesylate 60 MG Caps  Commonly known as:  VYVANSE      Take 1 capsule (60 mg total) by mouth every morning.   Refills: 0     losartan 100 MG Tabs  Commonly known as: Cozaar      Take 1 tablet (100 mg total) by mouth daily. Hold if systolic blood pressure <130   Refills: 0     NIFEdipine ER 60 MG Tb24  Commonly known as: ADALAT CC      Take 1 tablet (60 mg total) by mouth in the morning and 1 tablet (60 mg total) before bedtime. Hold if systolic blood pressure <130.   Refills: 0     Polyethylene Glycol 3350 17 g Pack  Commonly known as: MIRALAX      Take 17 g by mouth in the morning and 17 g before bedtime.   Quantity: 60 packet  Refills: 0     Renvela 800 MG Tabs  Generic drug: sevelamer carbonate      Take 1 tablet (800 mg total) by mouth 3 (three) times daily with meals.   Refills: 0               Where to Get Your Medications        These medications were sent to [id number] - [place], [place] - [address] [phone], [phone]  [address] [address]      Phone: [phone]   amoxicillin clavulanate 500-125 MG Tabs  benzonatate 200 MG Caps         [place]   [place]    Follow-up appointment:   Prieto Novak MD  [address]  [address]  [facility] [id number] [phone]    Follow up in 2 week(s)      Appointments for Next 30 Days 5/19/2024 - 6/18/2024      None            Vital signs:       Vital signs:  Temp:  [98.2 °F (36.8 °C)-98.7 °F (37.1 °C)] 98.7 °F (37.1 °C)  Pulse:  [] 105  Resp:  [18-20] 18  BP: ()/() 132/88  SpO2:  [93 %-100 %] 93 %     Physical Exam:    General: No acute distress.   HEENT:  EOMI, PERRLA, OP clear  Respiratory: Clear to auscultation bilaterally. No wheezes. No rhonchi.  Cardiovascular: S1, S2. Regular rate and rhythm. No murmurs.  Abdomen: Soft, nontender, nondistended.  Positive bowel sounds. No rebound or guarding.  Extremities: No edema.  Neuro:  Grossly non focal, no motor deficits.        -----------------------------------------------------------------------------------------------  PATIENT DISCHARGE INSTRUCTIONS: See electronic chart    Ria Gutierrez MD    Time spent:  > 30 minutes

## 2024-06-14 ENCOUNTER — APPOINTMENT (OUTPATIENT)
Dept: GENERAL RADIOLOGY | Age: 44
DRG: 291 | End: 2024-06-14
Attending: EMERGENCY MEDICINE

## 2024-06-14 ENCOUNTER — HOSPITAL ENCOUNTER (INPATIENT)
Facility: HOSPITAL | Age: 44
LOS: 3 days | Discharge: HOME | DRG: 291 | End: 2024-06-17
Attending: EMERGENCY MEDICINE | Admitting: HOSPITALIST

## 2024-06-14 DIAGNOSIS — N17.9 ACUTE RENAL FAILURE, UNSPECIFIED ACUTE RENAL FAILURE TYPE (HCC): ICD-10-CM

## 2024-06-14 DIAGNOSIS — E87.20 METABOLIC ACIDOSIS: Primary | ICD-10-CM

## 2024-06-14 DIAGNOSIS — I50.9 ACUTE ON CHRONIC CONGESTIVE HEART FAILURE, UNSPECIFIED HEART FAILURE TYPE (HCC): ICD-10-CM

## 2024-06-14 LAB
ALBUMIN SERPL-MCNC: 3 G/DL (ref 3.4–5)
ALBUMIN/GLOB SERPL: 0.8 {RATIO} (ref 1–2)
ALP LIVER SERPL-CCNC: 86 U/L
ALT SERPL-CCNC: 38 U/L
ANION GAP SERPL CALC-SCNC: 12 MMOL/L (ref 0–18)
AST SERPL-CCNC: 50 U/L (ref 15–37)
BASOPHILS # BLD AUTO: 0.1 X10(3) UL (ref 0–0.2)
BASOPHILS NFR BLD AUTO: 1.3 %
BILIRUB SERPL-MCNC: 0.3 MG/DL (ref 0.1–2)
BUN BLD-MCNC: 95 MG/DL (ref 9–23)
CALCIUM BLD-MCNC: 7.3 MG/DL (ref 8.5–10.1)
CHLORIDE SERPL-SCNC: 112 MMOL/L (ref 98–112)
CO2 SERPL-SCNC: 15 MMOL/L (ref 21–32)
CREAT BLD-MCNC: 9.6 MG/DL
EGFRCR SERPLBLD CKD-EPI 2021: 6 ML/MIN/1.73M2 (ref 60–?)
EOSINOPHIL # BLD AUTO: 0.26 X10(3) UL (ref 0–0.7)
EOSINOPHIL NFR BLD AUTO: 3.3 %
ERYTHROCYTE [DISTWIDTH] IN BLOOD BY AUTOMATED COUNT: 13.3 %
GLOBULIN PLAS-MCNC: 3.9 G/DL (ref 2.8–4.4)
GLUCOSE BLD-MCNC: 175 MG/DL (ref 70–99)
HCT VFR BLD AUTO: 26.5 %
HGB BLD-MCNC: 8.8 G/DL
IMM GRANULOCYTES # BLD AUTO: 0.02 X10(3) UL (ref 0–1)
IMM GRANULOCYTES NFR BLD: 0.3 %
LYMPHOCYTES # BLD AUTO: 0.96 X10(3) UL (ref 1–4)
LYMPHOCYTES NFR BLD AUTO: 12.3 %
MCH RBC QN AUTO: 32.1 PG (ref 26–34)
MCHC RBC AUTO-ENTMCNC: 33.2 G/DL (ref 31–37)
MCV RBC AUTO: 96.7 FL
MONOCYTES # BLD AUTO: 0.82 X10(3) UL (ref 0.1–1)
MONOCYTES NFR BLD AUTO: 10.5 %
NEUTROPHILS # BLD AUTO: 5.66 X10 (3) UL (ref 1.5–7.7)
NEUTROPHILS # BLD AUTO: 5.66 X10(3) UL (ref 1.5–7.7)
NEUTROPHILS NFR BLD AUTO: 72.3 %
NT-PROBNP SERPL-MCNC: ABNORMAL PG/ML (ref ?–125)
OSMOLALITY SERPL CALC.SUM OF ELEC: 322 MOSM/KG (ref 275–295)
PLATELET # BLD AUTO: 257 10(3)UL (ref 150–450)
POTASSIUM SERPL-SCNC: 4.4 MMOL/L (ref 3.5–5.1)
PROT SERPL-MCNC: 6.9 G/DL (ref 6.4–8.2)
RBC # BLD AUTO: 2.74 X10(6)UL
SODIUM SERPL-SCNC: 139 MMOL/L (ref 136–145)
WBC # BLD AUTO: 7.8 X10(3) UL (ref 4–11)

## 2024-06-14 PROCEDURE — 90935 HEMODIALYSIS ONE EVALUATION: CPT | Performed by: INTERNAL MEDICINE

## 2024-06-14 PROCEDURE — 5A1D70Z PERFORMANCE OF URINARY FILTRATION, INTERMITTENT, LESS THAN 6 HOURS PER DAY: ICD-10-PCS | Performed by: INTERNAL MEDICINE

## 2024-06-14 PROCEDURE — 96376 TX/PRO/DX INJ SAME DRUG ADON: CPT

## 2024-06-14 PROCEDURE — 80053 COMPREHEN METABOLIC PANEL: CPT | Performed by: EMERGENCY MEDICINE

## 2024-06-14 PROCEDURE — 71045 X-RAY EXAM CHEST 1 VIEW: CPT | Performed by: EMERGENCY MEDICINE

## 2024-06-14 PROCEDURE — 96374 THER/PROPH/DIAG INJ IV PUSH: CPT

## 2024-06-14 PROCEDURE — 83880 ASSAY OF NATRIURETIC PEPTIDE: CPT | Performed by: EMERGENCY MEDICINE

## 2024-06-14 PROCEDURE — 96375 TX/PRO/DX INJ NEW DRUG ADDON: CPT

## 2024-06-14 PROCEDURE — 85025 COMPLETE CBC W/AUTO DIFF WBC: CPT | Performed by: EMERGENCY MEDICINE

## 2024-06-14 PROCEDURE — 99285 EMERGENCY DEPT VISIT HI MDM: CPT

## 2024-06-14 RX ORDER — HYDRALAZINE HYDROCHLORIDE 20 MG/ML
10 INJECTION INTRAMUSCULAR; INTRAVENOUS ONCE
Status: COMPLETED | OUTPATIENT
Start: 2024-06-14 | End: 2024-06-14

## 2024-06-14 RX ORDER — CARVEDILOL 12.5 MG/1
25 TABLET ORAL 2 TIMES DAILY WITH MEALS
Status: DISCONTINUED | OUTPATIENT
Start: 2024-06-15 | End: 2024-06-17

## 2024-06-14 RX ORDER — ALBUMIN (HUMAN) 12.5 G/50ML
25 SOLUTION INTRAVENOUS
Status: DISCONTINUED | OUTPATIENT
Start: 2024-06-14 | End: 2024-06-16

## 2024-06-14 RX ORDER — ARIPIPRAZOLE 5 MG/1
5 TABLET ORAL DAILY
Status: DISCONTINUED | OUTPATIENT
Start: 2024-06-15 | End: 2024-06-17

## 2024-06-14 RX ORDER — PROCHLORPERAZINE EDISYLATE 5 MG/ML
5 INJECTION INTRAMUSCULAR; INTRAVENOUS EVERY 8 HOURS PRN
Status: DISCONTINUED | OUTPATIENT
Start: 2024-06-14 | End: 2024-06-16 | Stop reason: DRUGHIGH

## 2024-06-14 RX ORDER — HYDROMORPHONE HYDROCHLORIDE 1 MG/ML
1 INJECTION, SOLUTION INTRAMUSCULAR; INTRAVENOUS; SUBCUTANEOUS ONCE
Status: COMPLETED | OUTPATIENT
Start: 2024-06-14 | End: 2024-06-14

## 2024-06-14 RX ORDER — ISOSORBIDE MONONITRATE 30 MG/1
30 TABLET, EXTENDED RELEASE ORAL DAILY
Status: DISCONTINUED | OUTPATIENT
Start: 2024-06-15 | End: 2024-06-17

## 2024-06-14 RX ORDER — FLUOXETINE HYDROCHLORIDE 20 MG/1
40 CAPSULE ORAL DAILY
Status: DISCONTINUED | OUTPATIENT
Start: 2024-06-15 | End: 2024-06-17

## 2024-06-14 RX ORDER — BUPROPION HYDROCHLORIDE 150 MG/1
150 TABLET ORAL DAILY
Status: DISCONTINUED | OUTPATIENT
Start: 2024-06-15 | End: 2024-06-17

## 2024-06-14 RX ORDER — HYDROMORPHONE HYDROCHLORIDE 1 MG/ML
0.5 INJECTION, SOLUTION INTRAMUSCULAR; INTRAVENOUS; SUBCUTANEOUS ONCE
Status: COMPLETED | OUTPATIENT
Start: 2024-06-14 | End: 2024-06-14

## 2024-06-14 RX ORDER — ONDANSETRON 2 MG/ML
4 INJECTION INTRAMUSCULAR; INTRAVENOUS EVERY 4 HOURS PRN
Status: DISCONTINUED | OUTPATIENT
Start: 2024-06-14 | End: 2024-06-14

## 2024-06-14 RX ORDER — HYDROMORPHONE HYDROCHLORIDE 1 MG/ML
0.5 INJECTION, SOLUTION INTRAMUSCULAR; INTRAVENOUS; SUBCUTANEOUS EVERY 30 MIN PRN
Status: DISPENSED | OUTPATIENT
Start: 2024-06-14 | End: 2024-06-15

## 2024-06-14 RX ORDER — SENNOSIDES 8.6 MG
17.2 TABLET ORAL NIGHTLY PRN
Status: DISCONTINUED | OUTPATIENT
Start: 2024-06-14 | End: 2024-06-17

## 2024-06-14 RX ORDER — CLONIDINE HYDROCHLORIDE 0.1 MG/1
0.1 TABLET ORAL
Status: DISCONTINUED | OUTPATIENT
Start: 2024-06-15 | End: 2024-06-17

## 2024-06-14 RX ORDER — MELATONIN
3 NIGHTLY PRN
Status: DISCONTINUED | OUTPATIENT
Start: 2024-06-14 | End: 2024-06-17

## 2024-06-14 RX ORDER — HEPARIN SODIUM 5000 [USP'U]/ML
5000 INJECTION, SOLUTION INTRAVENOUS; SUBCUTANEOUS EVERY 8 HOURS SCHEDULED
Status: DISCONTINUED | OUTPATIENT
Start: 2024-06-14 | End: 2024-06-17

## 2024-06-14 RX ORDER — DICYCLOMINE HYDROCHLORIDE 10 MG/1
10 CAPSULE ORAL 3 TIMES DAILY PRN
Status: DISCONTINUED | OUTPATIENT
Start: 2024-06-14 | End: 2024-06-17

## 2024-06-14 RX ORDER — NIFEDIPINE 30 MG/1
60 TABLET, EXTENDED RELEASE ORAL 2 TIMES DAILY
Status: DISCONTINUED | OUTPATIENT
Start: 2024-06-14 | End: 2024-06-17

## 2024-06-14 RX ORDER — FUROSEMIDE 10 MG/ML
40 INJECTION INTRAMUSCULAR; INTRAVENOUS ONCE
Status: COMPLETED | OUTPATIENT
Start: 2024-06-14 | End: 2024-06-14

## 2024-06-14 RX ORDER — SEVELAMER CARBONATE 800 MG/1
800 TABLET, FILM COATED ORAL
Status: DISCONTINUED | OUTPATIENT
Start: 2024-06-15 | End: 2024-06-17

## 2024-06-14 RX ORDER — ONDANSETRON 2 MG/ML
4 INJECTION INTRAMUSCULAR; INTRAVENOUS EVERY 6 HOURS PRN
Status: DISCONTINUED | OUTPATIENT
Start: 2024-06-14 | End: 2024-06-17

## 2024-06-14 RX ORDER — BISACODYL 10 MG
10 SUPPOSITORY, RECTAL RECTAL
Status: DISCONTINUED | OUTPATIENT
Start: 2024-06-14 | End: 2024-06-17

## 2024-06-14 RX ORDER — POLYETHYLENE GLYCOL 3350 17 G/17G
17 POWDER, FOR SOLUTION ORAL DAILY PRN
Status: DISCONTINUED | OUTPATIENT
Start: 2024-06-14 | End: 2024-06-17

## 2024-06-14 RX ORDER — ACETAMINOPHEN 500 MG
500 TABLET ORAL EVERY 4 HOURS PRN
Status: DISCONTINUED | OUTPATIENT
Start: 2024-06-14 | End: 2024-06-17

## 2024-06-14 RX ORDER — HYDRALAZINE HYDROCHLORIDE 50 MG/1
50 TABLET, FILM COATED ORAL 2 TIMES DAILY
Status: DISCONTINUED | OUTPATIENT
Start: 2024-06-14 | End: 2024-06-17

## 2024-06-14 RX ORDER — LOSARTAN POTASSIUM 100 MG/1
100 TABLET ORAL DAILY
Status: DISCONTINUED | OUTPATIENT
Start: 2024-06-15 | End: 2024-06-17

## 2024-06-14 RX ORDER — LAMOTRIGINE 150 MG/1
150 TABLET ORAL DAILY
Status: DISCONTINUED | OUTPATIENT
Start: 2024-06-15 | End: 2024-06-17

## 2024-06-14 RX ORDER — ONDANSETRON 2 MG/ML
4 INJECTION INTRAMUSCULAR; INTRAVENOUS ONCE
Status: COMPLETED | OUTPATIENT
Start: 2024-06-14 | End: 2024-06-14

## 2024-06-14 NOTE — ED INITIAL ASSESSMENT (HPI)
Pt recently admitted to [facility] for pneumonia last week and fistula surgery last week for left arm fistula.

## 2024-06-14 NOTE — ED PROVIDER NOTES
Patient Seen in: [facility] In [place]      History     Chief Complaint   Patient presents with    Difficulty Breathing    Back Pain     Stated Complaint: Pt with c/o difficulty breathing that started last night, but became worse this*    Subjective:   HPI    44-year-old male who has a known history of end-stage renal disease on dialysis, congestive heart failure, COPD as well as history of DVT in the remote past presents with shortness of breath.  He denies any fevers or chills.  He states he is also having some back pain which is chronic for him.  He states he was recently discharged from [facility] and was told at that time he had a pneumonia on the right side.  Patient is to be dialyzed on Tuesdays Thursdays and Saturdays but did not get dialysis yesterday.  He did not contact his nephrologist to change dialysis date or make any attempts to go today.    Objective:   Past Medical History:    Asthma ([facility])    Attention deficit hyperactivity disorder (ADHD)    Back problem    Bipolar 1 disorder ([facility])    CKD (chronic kidney disease) stage 3, GFR 30-59 ml/min ([facility])    Dr Meeks    Congestive heart disease ([facility])    COPD (chronic obstructive pulmonary disease) ([facility])    Coronary atherosclerosis    Deep vein thrombosis ([facility])    at age 17 R/T cast    Depression    Diabetes ([facility])    Essential hypertension    3/21 echo: severe concentric LVH with normal EF and no MR or pHTN    Extrinsic asthma, unspecified    Heart attack ([facility])    2016- angiogram- no intervention    Heart valve disease    mitral valve repair in 1994/    High blood pressure    High cholesterol    History of mitral valve repair    Hyperlipidemia    Low back pain    tight and stiff after sweeping and mopping    LVH (left ventricular hypertrophy)    Migraines    Mixed hyperlipidemia     HDL 38 LDL 97 VLDL 57     Monoclonal gammopathy    IgG kappa     MVP (mitral valve prolapse)    Repair 1994 at [place]; echoes as  recently as 3/21 show mild or trivial MR and no stenosis    Neuropathy    Osteoarthritis    hip ,knees    Pneumonia due to organism    Pulmonary embolism (HCC)    Renal disorder    Stroke (HCC)    TIA (transient ischemic attack)    Initial history of left-sided weakness and slurred speech. (+) cocaine. MRI of the brain, CT angiogram of the head and neck, and 2D echo are all unremarkable.     TMJ (dislocation of temporomandibular joint)    Troponin level elevated    Trop 60 60 47 with TIA and no CP: Lexiscan negative with EF 51              Past Surgical History:   Procedure Laterality Date    Av fistula revision, open Left     Colonoscopy N/A 2023    Procedure: COLONOSCOPY;  Surgeon: Heath Vu MD;  Location:  ENDOSCOPY    Colonoscopy N/A 2023    Procedure: COLONOSCOPY with cold snare polypectomy and forcep polypectomy;  Surgeon: Ousmane Suarez MD;  Location:  ENDOSCOPY    Colonoscopy & polypectomy      Egd  2019    Duodenitis. Biopsied. EUS for weight loss was negative    Heart surgery      Hernia surgery  2022    Dr Barnes    Laminectomy,>2 sgmt,lumbar  2018    L4-L5 Decomp Discectomy ROEM L4-L5    Mitralplasty w cp bypass      [place]: Repair    Repair rotator cuff,chronic Left     torn and had a ruptured bicep    Valve repair      mitral valve                Social History     Socioeconomic History    Marital status:    Tobacco Use    Smoking status: Former     Current packs/day: 0.00     Average packs/day: 1 pack/day for 27.0 years (27.0 ttl pk-yrs)     Types: Cigarettes     Start date: 1995     Quit date: 2022     Years since quittin.2    Smokeless tobacco: Never   Vaping Use    Vaping status: Never Used   Substance and Sexual Activity    Alcohol use: No    Drug use: No     Social Determinants of Health     Financial Resource Strain: Medium Risk (2024)    Received from [facility]    Overall Financial Resource Strain (CARDIA)      Difficulty of Paying Living Expenses: Somewhat hard   Food Insecurity: No Food Insecurity (5/14/2024)    Food Insecurity     Food Insecurity: Never true   Transportation Needs: No Transportation Needs (5/14/2024)    Transportation Needs     Lack of Transportation: No   Physical Activity: Inactive (5/7/2024)    Received from [facility]    Exercise Vital Sign     Days of Exercise per Week: 0 days     Minutes of Exercise per Session: 0 min   Stress: Stress Concern Present (5/7/2024)    Received from [facility]    [place] [employer] - Occupational Stress Questionnaire     Feeling of Stress : Rather much   Social Connections: Socially Isolated (5/7/2024)    Received from [facility]    Social Connection and Isolation Panel [NHANES]     Frequency of Communication with Friends and Family: More than three times a week     Frequency of Social Gatherings with Friends and Family: More than three times a week     Attends Alevism Services: Never     Active Member of Clubs or Organizations: No     Attends Club or Organization Meetings: Never     Marital Status: Patient unable to answer   Housing Stability: Low Risk  (5/14/2024)    Housing Stability     Housing Instability: No              Review of Systems   All other systems reviewed and are negative.      Positive for stated complaint: Pt with c/o difficulty breathing that started last night, but became worse this*  Other systems are as noted in HPI.  Constitutional and vital signs reviewed.      All other systems reviewed and negative except as noted above.    Physical Exam     ED Triage Vitals   BP 06/14/24 1621 (!) 183/121   Pulse 06/14/24 1621 85   Resp 06/14/24 1621 21   Temp 06/14/24 1628 98.3 °F (36.8 °C)   Temp src 06/14/24 1628 Oral   SpO2 06/14/24 1621 94 %   O2 Device 06/14/24 1621 None (Room air)       Current Vitals:   Vital Signs  BP: (!) 184/125  Pulse: 88  Resp: (!) 30  Temp: 97.7 °F (36.5 °C)  Temp src:  Temporal    Oxygen Therapy  SpO2: 94 %  O2 Device: Nasal cannula  O2 Flow Rate (L/min): 2 L/min            Physical Exam  Vitals and nursing note reviewed.   Constitutional:       General: He is not in acute distress.     Appearance: Normal appearance. He is well-developed.      Comments: Appears comfortable lying on his side and using his phone until medical staff walked in the room and then will complain of pain   HENT:      Head: Normocephalic and atraumatic.   Cardiovascular:      Rate and Rhythm: Normal rate and regular rhythm.      Pulses: Normal pulses.      Heart sounds: Normal heart sounds.   Pulmonary:      Breath sounds: No stridor. Rales present.      Comments: Tachypneic, diminished with some rales in the bases  Abdominal:      General: Bowel sounds are normal.      Palpations: Abdomen is soft.   Musculoskeletal:         General: Normal range of motion.      Cervical back: Normal range of motion and neck supple.      Right lower leg: No edema.      Left lower leg: No edema.   Lymphadenopathy:      Cervical: No cervical adenopathy.   Skin:     General: Skin is warm and dry.      Capillary Refill: Capillary refill takes less than 2 seconds.   Neurological:      General: No focal deficit present.      Mental Status: He is alert and oriented to person, place, and time.              ED Course     Labs Reviewed   COMP METABOLIC PANEL (14) - Abnormal; Notable for the following components:       Result Value    Glucose 175 (*)     CO2 15.0 (*)     BUN 95 (*)     Creatinine 9.60 (*)     Calcium, Total 7.3 (*)     Calculated Osmolality 322 (*)     eGFR-Cr 6 (*)     AST 50 (*)     Albumin 3.0 (*)     A/G Ratio 0.8 (*)     All other components within normal limits   PRO BETA NATRIURETIC PEPTIDE - Abnormal; Notable for the following components:    Pro-Beta Natriuretic Peptide 23,236 (*)     All other components within normal limits   CBC W/ DIFFERENTIAL - Abnormal; Notable for the following components:    RBC 2.74  (*)     HGB 8.8 (*)     HCT 26.5 (*)     Lymphocyte Absolute 0.96 (*)     All other components within normal limits   CBC WITH DIFFERENTIAL WITH PLATELET    Narrative:     The following orders were created for panel order CBC With Differential With Platelet.  Procedure                               Abnormality         Status                     ---------                               -----------         ------                     CBC W/ DIFFERENTIAL[[id number]]          Abnormal            Final result                 Please view results for these tests on the individual orders.             XR CHEST AP PORTABLE  (CPT=71045)    Result Date: 6/14/2024  PROCEDURE:  XR CHEST AP PORTABLE  (CPT=71045)  TECHNIQUE:  AP chest radiograph was obtained.  COMPARISON:  [facility], XR, XR CHEST AP PORTABLE  (CPT=71045), 5/14/2024, 1:46 PM.  INDICATIONS:  Pt with c/o difficulty breathing that started last night, but became worse this morning.  PATIENT STATED HISTORY: (As transcribed by Technologist)  Patient complains of shortness of breath, lower back pain and right sided chest and abdominal pain X 2 weeks.  The symptoms became more severe today.  He states he can feel crackling/popping to his right lung when he breaths.  He has a history of two open heart surgeries, the most recent being in December 2022.    FINDINGS:             CONCLUSION:   Right-sided pleural effusion and right basilar consolidation is mildly decreased.  Right-sided pheresis catheter with tip in the SVC.  Sequelae of valve replacement is noted.  No pneumothorax.   LOCATION:  [id number]      Dictated by (CST): Ryan Hernández MD on 6/14/2024 at 5:26 PM     Finalized by (CST): Ryan Hernández MD on 6/14/2024 at 5:27 PM              MDM      Patient had IV established and blood work obtained.  He was placed on the monitor.  His blood pressure is noted to be elevated.  Patient has responded well to hydralazine in the past and was given a dose of hydralazine he  states he does make some urine and was given a dose of Lasix.  He had a chest x-ray that personally reviewed he does have a pleural effusion on the right side but no pneumothorax or any other obvious abnormalities.  I reviewed radiology report they had compared it to previous and actually stated that he has an improvement of his pleural effusion.  Patient's O2 saturation overall is good.  Currently he is not wheezing.  No clear signs of infection.  He was given a dose of Dilaudid.  With the Dilaudid he did become drowsy and he does have a history of sleep apnea and did transiently drop his O2 saturation.  He was found on blood work to have an acute metabolic acidosis.  Most likely this is the etiology of his shortness of breath.  Currently is not hyperkalemic.  He is chronically having creatinines in the eights and nines however his BUN is significantly elevated and he may have more symptoms consistent with uremia as well.  At this time I do feel that he requires dialysis I did contact his nephrologist and there is currently no outpatient dialysis center is open for him to have urgent dialysis.  Subsequently he will be admitted.  Patient very much prefers to go to [facility] and subsequently was transferred to [facility] at his request.  He is part of the [facility] system and I contacted the hospitalist as well as nephrology.  Patient did have a repeat increase of his blood pressure just prior to leaving and was given another dose of hydralazine.  He does have a history of noncompliance with his medications and these will need to be restarted as well.  Further care and treatment per his admitting team    A total of 35 minutes of critical care time (exclusive of billable procedures) was administered to manage the patient's critical lab values and elevated blood pressure due to his noncompliance with his dialysis and blood pressure medications causing subsequent worsening renal failure and metabolic acidosis.   This involved direct patient intervention, complex decision making, and/or extensive discussions with the patient, family, and clinical staff.    Admission disposition: 6/14/2024  6:38 PM                                        Medical Decision Making      Disposition and Plan     Clinical Impression:  1. Metabolic acidosis    2. Acute renal failure, unspecified acute renal failure type (HCC)    3. Acute on chronic congestive heart failure, unspecified heart failure type (HCC)         Disposition:  Admit  6/14/2024  6:38 pm    Follow-up:  No follow-up provider specified.        Medications Prescribed:  Current Discharge Medication List                            Hospital Problems       Present on Admission  Date Reviewed: 6/14/2024            ICD-10-CM Noted POA    * (Principal) Metabolic acidosis E87.20 4/17/2022 Unknown

## 2024-06-15 LAB
ALBUMIN SERPL-MCNC: 3.4 G/DL (ref 3.4–5)
ANION GAP SERPL CALC-SCNC: 8 MMOL/L (ref 0–18)
BASOPHILS # BLD AUTO: 0.09 X10(3) UL (ref 0–0.2)
BASOPHILS NFR BLD AUTO: 1.5 %
BUN BLD-MCNC: 39 MG/DL (ref 9–23)
CALCIUM BLD-MCNC: 8.4 MG/DL (ref 8.5–10.1)
CHLORIDE SERPL-SCNC: 105 MMOL/L (ref 98–112)
CO2 SERPL-SCNC: 28 MMOL/L (ref 21–32)
CREAT BLD-MCNC: 5.74 MG/DL
EGFRCR SERPLBLD CKD-EPI 2021: 12 ML/MIN/1.73M2 (ref 60–?)
EOSINOPHIL # BLD AUTO: 0.24 X10(3) UL (ref 0–0.7)
EOSINOPHIL NFR BLD AUTO: 4.1 %
ERYTHROCYTE [DISTWIDTH] IN BLOOD BY AUTOMATED COUNT: 13.2 %
GLUCOSE BLD-MCNC: 115 MG/DL (ref 70–99)
GLUCOSE BLD-MCNC: 145 MG/DL (ref 70–99)
GLUCOSE BLD-MCNC: 245 MG/DL (ref 70–99)
HCT VFR BLD AUTO: 29 %
HGB BLD-MCNC: 10.3 G/DL
IMM GRANULOCYTES # BLD AUTO: 0.01 X10(3) UL (ref 0–1)
IMM GRANULOCYTES NFR BLD: 0.2 %
LYMPHOCYTES # BLD AUTO: 1.09 X10(3) UL (ref 1–4)
LYMPHOCYTES NFR BLD AUTO: 18.4 %
MCH RBC QN AUTO: 32.4 PG (ref 26–34)
MCHC RBC AUTO-ENTMCNC: 35.5 G/DL (ref 31–37)
MCV RBC AUTO: 91.2 FL
MONOCYTES # BLD AUTO: 0.78 X10(3) UL (ref 0.1–1)
MONOCYTES NFR BLD AUTO: 13.2 %
NEUTROPHILS # BLD AUTO: 3.7 X10 (3) UL (ref 1.5–7.7)
NEUTROPHILS # BLD AUTO: 3.7 X10(3) UL (ref 1.5–7.7)
NEUTROPHILS NFR BLD AUTO: 62.6 %
OSMOLALITY SERPL CALC.SUM OF ELEC: 302 MOSM/KG (ref 275–295)
PHOSPHATE SERPL-MCNC: 4.6 MG/DL (ref 2.5–4.9)
PLATELET # BLD AUTO: 273 10(3)UL (ref 150–450)
POTASSIUM SERPL-SCNC: 3.3 MMOL/L (ref 3.5–5.1)
PROCALCITONIN SERPL-MCNC: 0.5 NG/ML (ref ?–0.16)
PTH-INTACT SERPL-MCNC: 734.8 PG/ML (ref 18.5–88)
RBC # BLD AUTO: 3.18 X10(6)UL
SODIUM SERPL-SCNC: 141 MMOL/L (ref 136–145)
WBC # BLD AUTO: 5.9 X10(3) UL (ref 4–11)

## 2024-06-15 PROCEDURE — 83970 ASSAY OF PARATHORMONE: CPT | Performed by: INTERNAL MEDICINE

## 2024-06-15 PROCEDURE — 82962 GLUCOSE BLOOD TEST: CPT

## 2024-06-15 PROCEDURE — 80069 RENAL FUNCTION PANEL: CPT | Performed by: INTERNAL MEDICINE

## 2024-06-15 PROCEDURE — 85025 COMPLETE CBC W/AUTO DIFF WBC: CPT | Performed by: INTERNAL MEDICINE

## 2024-06-15 PROCEDURE — 84145 PROCALCITONIN (PCT): CPT | Performed by: INTERNAL MEDICINE

## 2024-06-15 RX ORDER — HYDROCODONE BITARTRATE AND ACETAMINOPHEN 5; 325 MG/1; MG/1
1 TABLET ORAL EVERY 4 HOURS PRN
Status: DISCONTINUED | OUTPATIENT
Start: 2024-06-15 | End: 2024-06-17

## 2024-06-15 RX ORDER — DEXTROAMPHETAMINE SACCHARATE, AMPHETAMINE ASPARTATE, DEXTROAMPHETAMINE SULFATE AND AMPHETAMINE SULFATE 2.5; 2.5; 2.5; 2.5 MG/1; MG/1; MG/1; MG/1
10 TABLET ORAL
Status: DISCONTINUED | OUTPATIENT
Start: 2024-06-15 | End: 2024-06-16

## 2024-06-15 RX ORDER — DOCUSATE SODIUM 100 MG/1
100 CAPSULE, LIQUID FILLED ORAL 2 TIMES DAILY PRN
Status: DISCONTINUED | OUTPATIENT
Start: 2024-06-15 | End: 2024-06-17

## 2024-06-15 RX ORDER — HYDROMORPHONE HYDROCHLORIDE 1 MG/ML
0.5 INJECTION, SOLUTION INTRAMUSCULAR; INTRAVENOUS; SUBCUTANEOUS EVERY 4 HOURS PRN
Status: DISCONTINUED | OUTPATIENT
Start: 2024-06-15 | End: 2024-06-15

## 2024-06-15 RX ORDER — FENOFIBRATE 134 MG/1
134 CAPSULE ORAL NIGHTLY
Status: DISCONTINUED | OUTPATIENT
Start: 2024-06-15 | End: 2024-06-17

## 2024-06-15 RX ORDER — POLYETHYLENE GLYCOL 3350 17 G/17G
17 POWDER, FOR SOLUTION ORAL 2 TIMES DAILY
Status: DISCONTINUED | OUTPATIENT
Start: 2024-06-15 | End: 2024-06-17

## 2024-06-15 RX ORDER — FLUTICASONE PROPIONATE 50 MCG
1 SPRAY, SUSPENSION (ML) NASAL DAILY
Status: DISCONTINUED | OUTPATIENT
Start: 2024-06-15 | End: 2024-06-17

## 2024-06-15 RX ORDER — HEPARIN SODIUM 1000 [USP'U]/ML
3600 INJECTION, SOLUTION INTRAVENOUS; SUBCUTANEOUS
Status: DISCONTINUED | OUTPATIENT
Start: 2024-06-15 | End: 2024-06-17

## 2024-06-15 RX ORDER — BISACODYL 5 MG/1
15 TABLET, DELAYED RELEASE ORAL DAILY
Status: DISCONTINUED | OUTPATIENT
Start: 2024-06-15 | End: 2024-06-17

## 2024-06-15 RX ORDER — HYDROCODONE BITARTRATE AND ACETAMINOPHEN 5; 325 MG/1; MG/1
2 TABLET ORAL EVERY 4 HOURS PRN
Status: DISCONTINUED | OUTPATIENT
Start: 2024-06-15 | End: 2024-06-17

## 2024-06-15 NOTE — PLAN OF CARE
Patient alert and oriented x 4. On 2l Oxygen support. Sinus  on cardiac monitor. Patient denies any chest pain or chest discomfort at this time.   Patient voids 750 ml out put , received iv lasix 40mg from ER, last BM 6/14. Admitted with metabolic acidosis, LIZBETH &Back pain, Rib cage pain . Pain mgt with dilaudid comfortable plus warm compress,     s/p stat Hemodialysis 3L off , RT internal  jugular catheter and lt AV fistula intact  and + yvette and thrill ,no bm here ,at home few time loose Bm, r/o C IDIFF ordered ant contact isolation precaution maintained ,Plan of care updated, all questions answered. Safety precautions in place. Bed alarm on. Will continue to monitor tele/labs/vital signs closely.     Plan:   Nephrology to see, monitor electrolytes, daily weight ,Strict&O, HD TTS        Problem: CARDIOVASCULAR - ADULT  Goal: Maintains optimal cardiac output and hemodynamic stability  Description: INTERVENTIONS:  - Monitor vital signs, rhythm, and trends  - Monitor for bleeding, hypotension and signs of decreased cardiac output  - Evaluate effectiveness of vasoactive medications to optimize hemodynamic stability  - Monitor arterial and/or venous puncture sites for bleeding and/or hematoma  - Assess quality of pulses, skin color and temperature  - Assess for signs of decreased coronary artery perfusion - ex. Angina  - Evaluate fluid balance, assess for edema, trend weights  Outcome: Progressing     Problem: GENITOURINARY - ADULT  Goal: Absence of urinary retention  Description: INTERVENTIONS:  - Assess patient’s ability to void and empty bladder  - Monitor intake/output and perform bladder scan as needed  - Follow urinary retention protocol/standard of care  - Consider collaborating with pharmacy to review patient's medication profile  - Implement strategies to promote bladder emptying  Outcome: Progressing     Problem: METABOLIC/FLUID AND ELECTROLYTES - ADULT  Goal: Glucose maintained within prescribed  range  Description: INTERVENTIONS:  - Monitor Blood Glucose as ordered  - Assess for signs and symptoms of hyperglycemia and hypoglycemia  - Administer ordered medications to maintain glucose within target range  - Assess barriers to adequate nutritional intake and initiate nutrition consult as needed  - Instruct patient on self management of diabetes  Outcome: Progressing

## 2024-06-15 NOTE — ED QUICK NOTES
Orders for admission, patient is aware of plan and ready to go upstairs. Any questions, please call ED JASON Nava at extension [phone].     Patient Covid vaccination status: Fully vaccinated     COVID Test Ordered in ED: None    COVID Suspicion at Admission: N/A    Running Infusions:  None    Mental Status/LOC at time of transport: A&Ox4    Other pertinent information: 2LO2 NC  CIWA score: N/A   NIH score:  N/A

## 2024-06-15 NOTE — CONSULTS
[facility]   part of [facility]    Report of Consultation    Godwin Fonseca Patient Status:  Inpatient    1980 MRN [id number]   Location [facility] 8NE-A Attending Ria Gutierrez MD   Hosp Day # 1 PCP Prieto Novak MD       REASON FOR CONSULT:     ESRD    HISTORY OF PRESENT ILLNESS:   43 yo M with history of ESRD TTS via RIJ tunnelled CVC ([facility]-Dr. Singletary), LUE AVF placement last week, hyperaldosteronism with history of hypertensive urgency, DM, bipolar disorder, asthma, chronic abd pain presented with shortness of breath. Did not undergo HD on Thursday. Was recently hospitalized with pna at another hospital. Nephrology consulted for ESRD management. Underwent HD overnight - tolerated 3L UF. Still has some R sided lower flank/chest discomfort. Also complaining of diarrhea x 2 d.        REVIEW OF SYSTEMS:     Please see HPI for pertinent positives. 10 point review of systems otherwise reviewed and negative.     HISTORY:     Past Medical History:    Asthma ([facility])    Attention deficit hyperactivity disorder (ADHD)    Back problem    Bipolar 1 disorder (HCC)    CKD (chronic kidney disease) stage 3, GFR 30-59 ml/min ([facility])    Dr Meeks    Congestive heart disease ([facility])    COPD (chronic obstructive pulmonary disease) ([facility])    Coronary atherosclerosis    Deep vein thrombosis ([facility])    at age 17 R/T cast    Depression    Diabetes (HCC)    Essential hypertension    3/21 echo: severe concentric LVH with normal EF and no MR or pHTN    Extrinsic asthma, unspecified    Heart attack (HCC)    - angiogram- no intervention    Heart valve disease    mitral valve repair in /    High blood pressure    High cholesterol    History of mitral valve repair    Hyperlipidemia    Low back pain    tight and stiff after sweeping and mopping    LVH (left ventricular hypertrophy)    Migraines    Mixed hyperlipidemia     HDL 38 LDL 97 VLDL 57     Monoclonal gammopathy    IgG kappa     MVP  (mitral valve prolapse)    Repair 1994 at [facility]; echoes as recently as 3/21 show mild or trivial MR and no stenosis    Neuropathy    Osteoarthritis    hip ,knees    Pneumonia due to organism    Pulmonary embolism (HCC)    Renal disorder    Stroke (HCC)    TIA (transient ischemic attack)    Initial history of left-sided weakness and slurred speech. (+) cocaine. MRI of the brain, CT angiogram of the head and neck, and 2D echo are all unremarkable.     TMJ (dislocation of temporomandibular joint)    Troponin level elevated    Trop 60 60 47 with TIA and no CP: Lexiscan negative with EF 51     Past Surgical History:   Procedure Laterality Date    Av fistula revision, open Left     Colonoscopy N/A 03/26/2023    Procedure: COLONOSCOPY;  Surgeon: Heath Vu MD;  Location:  ENDOSCOPY    Colonoscopy N/A 12/30/2023    Procedure: COLONOSCOPY with cold snare polypectomy and forcep polypectomy;  Surgeon: Ousmane Suarez MD;  Location:  ENDOSCOPY    Colonoscopy & polypectomy  2019    Egd  2019    Duodenitis. Biopsied. EUS for weight loss was negative    Heart surgery      Hernia surgery  08/17/2022    Dr Barnes    Laminectomy,>2 sgmt,lumbar  09/06/2018    L4-L5 Decomp Discectomy ROEM L4-L5    Mitralplasty w cp bypass  1994    [facility]: Repair    Repair rotator cuff,chronic Left     torn and had a ruptured bicep    Valve repair  1994    mitral valve     Family History   Problem Relation Age of Onset    Hypertension Father     Alcohol and Other Disorders Associated Father     Substance Abuse Father         cocaine    Dementia Father     Cancer Father         lung    Diabetes Mother     Cancer Mother         multiple myeloma    Hypertension Mother     Anxiety Maternal Aunt     Depression Maternal Aunt     Anxiety Maternal Aunt     Depression Maternal Aunt     Bipolar Disorder Maternal Aunt     Diabetes Maternal Grandmother     Hypertension Maternal Grandmother     Cancer Maternal Grandfather         stomach cancer    Diabetes  Maternal Grandfather     Hypertension Maternal Grandfather     Alcohol and Other Disorders Associated Maternal Grandfather     Hypertension Paternal Grandmother     Hypertension Paternal Grandfather     Cancer Sister         uterine and ovarian    Hypertension Sister     Cancer Maternal Uncle         lung    Cancer Paternal Aunt         throat      reports that he quit smoking about 2 years ago. His smoking use included cigarettes. He started smoking about 29 years ago. He has a 27 pack-year smoking history. He has never used smokeless tobacco. He reports that he does not drink alcohol and does not use drugs.    ALLERGIES:     Allergies   Allergen Reactions    Hydrochlorothiazide RASH and HIVES       MEDICATIONS:       Current Facility-Administered Medications:     heparin (Porcine) 1000 UNIT/ML injection 3,600 Units, 3,600 Units, Intravenous, PRN Dialysis    HYDROcodone-acetaminophen (Norco) 5-325 MG per tab 1 tablet, 1 tablet, Oral, Q4H PRN **OR** HYDROcodone-acetaminophen (Norco) 5-325 MG per tab 2 tablet, 2 tablet, Oral, Q4H PRN    bisacodyl (Dulcolax) DR tab 15 mg, 15 mg, Oral, Daily    docusate sodium (Colace) cap 100 mg, 100 mg, Oral, BID PRN    fenofibrate micronized (Lofibra) cap 134 mg, 134 mg, Oral, Nightly    fluticasone propionate (Flonase) 50 MCG/ACT nasal suspension 1 spray, 1 spray, Each Nare, Daily    amphetamine-dextroamphetamine (Adderall) tab 10 mg, 10 mg, Oral, BID AC    polyethylene glycol (PEG 3350) (Miralax) 17 g oral packet 17 g, 17 g, Oral, BID    lidocaine-menthol 4-1 % patch 1 patch, 1 patch, Transdermal, Daily    heparin (Porcine) 5000 UNIT/ML injection 5,000 Units, 5,000 Units, Subcutaneous, Q8H MARTHA    acetaminophen (Tylenol Extra Strength) tab 500 mg, 500 mg, Oral, Q4H PRN    ondansetron (Zofran) 4 MG/2ML injection 4 mg, 4 mg, Intravenous, Q6H PRN    prochlorperazine (Compazine) 10 MG/2ML injection 5 mg, 5 mg, Intravenous, Q8H PRN    polyethylene glycol (PEG 3350) (Miralax) 17 g oral  packet 17 g, 17 g, Oral, Daily PRN    sennosides (Senokot) tab 17.2 mg, 17.2 mg, Oral, Nightly PRN    bisacodyl (Dulcolax) 10 MG rectal suppository 10 mg, 10 mg, Rectal, Daily PRN    melatonin tab 3 mg, 3 mg, Oral, Nightly PRN    sodium chloride 0.9 % IV bolus 100 mL, 100 mL, Intravenous, Q30 Min PRN **AND** albumin human (Albumin) 25% injection 25 g, 25 g, Intravenous, PRN Dialysis    ARIPiprazole (Abilify) tab 5 mg, 5 mg, Oral, Daily    buPROPion ER (Wellbutrin XL) 24 hr tab 150 mg, 150 mg, Oral, Daily    carvedilol (Coreg) tab 25 mg, 25 mg, Oral, BID with meals    cloNIDine (Catapres) tab 0.1 mg, 0.1 mg, Oral, Daily with food    dicyclomine (Bentyl) cap 10 mg, 10 mg, Oral, TID PRN    FLUoxetine (PROzac) cap 40 mg, 40 mg, Oral, Daily    hydrALAZINE (Apresoline) tab 50 mg, 50 mg, Oral, BID    isosorbide mononitrate ER (Imdur) 24 hr tab 30 mg, 30 mg, Oral, Daily    lamoTRIgine (LaMICtal) tab 150 mg, 150 mg, Oral, Daily    losartan (Cozaar) tab 100 mg, 100 mg, Oral, Daily    NIFEdipine ER (Procardia-XL) 24 hr tab 60 mg, 60 mg, Oral, BID    sevelamer carbonate (Renvela) tab 800 mg, 800 mg, Oral, TID CC  No current outpatient medications on file.         PHYSICAL EXAM:     Vital Signs: /53 (BP Location: Right arm)   Pulse 83   Temp 98.3 °F (36.8 °C) (Oral)   Resp 17   Ht 188 cm (6' 2\")   Wt 241 lb 8 oz (109.5 kg)   SpO2 94%   BMI 31.01 kg/m²   Temp (24hrs), Av.2 °F (36.8 °C), Min:97.7 °F (36.5 °C), Max:98.7 °F (37.1 °C)       Intake/Output Summary (Last 24 hours) at 6/15/2024 1234  Last data filed at 6/15/2024 0442  Gross per 24 hour   Intake 180 ml   Output 3750 ml   Net -3570 ml     Wt Readings from Last 3 Encounters:   06/15/24 241 lb 8 oz (109.5 kg)   24 238 lb (108 kg)   24 238 lb (108 kg)       General: pleasant, well appearing  Skin: no visible rashes  HEENT: NCAT  Cardiac: Regular rate and rhythm, no murmur/gallop or rub  Lungs: crackles in R lung base  Abdomen: Soft,  NTND  Extremities: warm, well perfused, no leg edema  Neurologic/Psych: mentating well,  RIJ CVC  LUE AVF + thrill and bruit    LABORATORY DATA:       Lab Results   Component Value Date     (H) 06/15/2024    BUN 39 (H) 06/15/2024    BUNCREA 13.0 03/07/2022    CREATSERUM 5.74 (H) 06/15/2024    ANIONGAP 8 06/15/2024    GFR 59 (L) 01/04/2018    GFRNAA 30 (L) 07/12/2022    GFRAA 35 (L) 07/12/2022    CA 8.4 (L) 06/15/2024    OSMOCALC 302 (H) 06/15/2024    ALKPHO 86 06/14/2024    AST 50 (H) 06/14/2024    ALT 38 06/14/2024    BILT 0.3 06/14/2024    TP 6.9 06/14/2024    ALB 3.4 06/15/2024    GLOBULIN 3.9 06/14/2024     06/15/2024    K 3.3 (L) 06/15/2024     06/15/2024    CO2 28.0 06/15/2024     Lab Results   Component Value Date    WBC 5.9 06/15/2024    RBC 3.18 (L) 06/15/2024    HGB 10.3 (L) 06/15/2024    HCT 29.0 (L) 06/15/2024    .0 06/15/2024    MPV 11.5 12/14/2012    MCV 91.2 06/15/2024    MCH 32.4 06/15/2024    MCHC 35.5 06/15/2024    RDW 13.2 06/15/2024    NEPRELIM 3.70 06/15/2024    NEPERCENT 62.6 06/15/2024    LYPERCENT 18.4 06/15/2024    MOPERCENT 13.2 06/15/2024    EOPERCENT 4.1 06/15/2024    BAPERCENT 1.5 06/15/2024    NE 3.70 06/15/2024    LYMABS 1.09 06/15/2024    MOABSO 0.78 06/15/2024    EOABSO 0.24 06/15/2024    BAABSO 0.09 06/15/2024     Lab Results   Component Value Date    MALBP 167.00 05/24/2023    CREUR 142.00 05/24/2023    CREUR 142.00 05/24/2023     Lab Results   Component Value Date    COLORUR Yellow 01/03/2024    CLARITY Clear 01/03/2024    SPECGRAVITY 1.025 01/03/2024    GLUUR Negative 01/03/2024    BILUR Negative 01/03/2024    KETUR Trace (A) 01/03/2024    BLOODURINE Negative 01/03/2024    PHURINE 5.5 01/03/2024    PROUR >=300 (A) 01/03/2024    UROBILINOGEN 0.2 01/03/2024    NITRITE Negative 01/03/2024    LEUUR Negative 01/03/2024    WBCUR 6-10 (A) 01/03/2024    RBCUR 0-2 01/03/2024    EPIUR Few (A) 01/03/2024    BACUR Rare (A) 01/03/2024    CAOXUR Occasional (A)  04/20/2018    HYLUR Present (A) 05/14/2019         IMAGING:     Reviewed.      ASSESSMENT/PLAN:     43 yo M with history of ESRD TTS via RIJ tunnelled CVC ([facility]-Dr. Singletary), LUE AVF placement last week, hyperaldosteronism with history of hypertensive urgency, DM, bipolar disorder, asthma, chronic abd pain presented with shortness of breath in the setting of missed HD treatment and recent pna. Also presenting with diarrhea.    ESRD:  -- s/p HD Friday  -- hold HD today, consider HD tentatively Sunday    LUE AVF:  -- L arm precautions  -- not ready to use    Anemia:  -- defer OWEN until BP more stable and Hb < 10    Ssecondary hyperparathyroidism, hypocalcemia:  -- start calcitriol 0.25 every other day    HTN:  -- resumed home meds    Diarrhea:  -- work up per primary    Recent pna, lower chest pain:  --defer further imaging to primary    Will follow.    Thank you for allowing me to participate in the care of your patient. Please do not hesitate to contact me with concerns or questions.    Lenka Bond MD  [facility]  [address] [place] [id number]    6/15/2024  12:34 PM

## 2024-06-15 NOTE — PLAN OF CARE
Assumed care of patient @ 0730 patient resting in bed, AOX4, reports moderate to severe headache, back pain, prn medication provided. Lung sounds clear, diminished bilaterally, O2 saturation adequate on 2L nasal cannula. No complaints of shortness of breath or chest pain at this time, pt states breathing has improved since admission. Sinus rhythm on tele, S1-S2 present, no adventitious sounds noted. Bowel sounds present and active in all quadrants, last bowel movement 6/14/2024 per pt. Patient voiding with decreased frequency due to hemodialysis. Non-pitting edema to BLE.    Plan of Care: HD per renal. BP control. Pain control.     Discussed plan of care with patient, verbalized understanding. Call light within reach.     Problem: Patient/Family Goals  Goal: Patient/Family Long Term Goal  Description: Patient's Long Term Goal: Stay out of the hospital    Interventions:  - Attend follow up appointments  - Follow HD schedule outpatient  - Take medications as ordered  - See additional Care Plan goals for specific interventions  Outcome: Progressing  Goal: Patient/Family Short Term Goal  Description: Patient's Short Term Goal: Pain control, breathe better    Interventions:   - Labs, tele, conults  - PRN pain medicine  - Resume home medications  - See additional Care Plan goals for specific interventions  Outcome: Progressing     Problem: CARDIOVASCULAR - ADULT  Goal: Maintains optimal cardiac output and hemodynamic stability  Description: INTERVENTIONS:  - Monitor vital signs, rhythm, and trends  - Monitor for bleeding, hypotension and signs of decreased cardiac output  - Evaluate effectiveness of vasoactive medications to optimize hemodynamic stability  - Monitor arterial and/or venous puncture sites for bleeding and/or hematoma  - Assess quality of pulses, skin color and temperature  - Assess for signs of decreased coronary artery perfusion - ex. Angina  - Evaluate fluid balance, assess for edema, trend  weights  Outcome: Progressing  Goal: Absence of cardiac arrhythmias or at baseline  Description: INTERVENTIONS:  - Continuous cardiac monitoring, monitor vital signs, obtain 12 lead EKG if indicated  - Evaluate effectiveness of antiarrhythmic and heart rate control medications as ordered  - Initiate emergency measures for life threatening arrhythmias  - Monitor electrolytes and administer replacement therapy as ordered  Outcome: Progressing     Problem: RESPIRATORY - ADULT  Goal: Achieves optimal ventilation and oxygenation  Description: INTERVENTIONS:  - Assess for changes in respiratory status  - Assess for changes in mentation and behavior  - Position to facilitate oxygenation and minimize respiratory effort  - Oxygen supplementation based on oxygen saturation or ABGs  - Provide Smoking Cessation handout, if applicable  - Encourage broncho-pulmonary hygiene including cough, deep breathe, Incentive Spirometry  - Assess the need for suctioning and perform as needed  - Assess and instruct to report SOB or any respiratory difficulty  - Respiratory Therapy support as indicated  - Manage/alleviate anxiety  - Monitor for signs/symptoms of CO2 retention  Outcome: Progressing     Problem: GASTROINTESTINAL - ADULT  Goal: Minimal or absence of nausea and vomiting  Description: INTERVENTIONS:  - Maintain adequate hydration with IV or PO as ordered and tolerated  - Nasogastric tube to low intermittent suction as ordered  - Evaluate effectiveness of ordered antiemetic medications  - Provide nonpharmacologic comfort measures as appropriate  - Advance diet as tolerated, if ordered  - Obtain nutritional consult as needed  - Evaluate fluid balance  Outcome: Progressing  Goal: Maintains or returns to baseline bowel function  Description: INTERVENTIONS:  - Assess bowel function  - Maintain adequate hydration with IV or PO as ordered and tolerated  - Evaluate effectiveness of GI medications  - Encourage mobilization and activity  -  Obtain nutritional consult as needed  - Establish a toileting routine/schedule  - Consider collaborating with pharmacy to review patient's medication profile  Outcome: Progressing  Goal: Maintains adequate nutritional intake (undernourished)  Description: INTERVENTIONS:  - Monitor percentage of each meal consumed  - Identify factors contributing to decreased intake, treat as appropriate  - Assist with meals as needed  - Monitor I&O, WT and lab values  - Obtain nutritional consult as needed  - Optimize oral hygiene and moisture  - Encourage food from home; allow for food preferences  - Enhance eating environment  Outcome: Progressing  Goal: Achieves appropriate nutritional intake (bariatric)  Description: INTERVENTIONS:  - Monitor for over-consumption  - Identify factors contributing to increased intake, treat as appropriate  - Monitor I&O, WT and lab values  - Obtain nutritional consult as needed  - Evaluate psychosocial factors contributing to over-consumption  Outcome: Progressing     Problem: GENITOURINARY - ADULT  Goal: Absence of urinary retention  Description: INTERVENTIONS:  - Assess patient’s ability to void and empty bladder  - Monitor intake/output and perform bladder scan as needed  - Follow urinary retention protocol/standard of care  - Consider collaborating with pharmacy to review patient's medication profile  - Implement strategies to promote bladder emptying  Outcome: Progressing     Problem: METABOLIC/FLUID AND ELECTROLYTES - ADULT  Goal: Glucose maintained within prescribed range  Description: INTERVENTIONS:  - Monitor Blood Glucose as ordered  - Assess for signs and symptoms of hyperglycemia and hypoglycemia  - Administer ordered medications to maintain glucose within target range  - Assess barriers to adequate nutritional intake and initiate nutrition consult as needed  - Instruct patient on self management of diabetes  Outcome: Progressing  Goal: Electrolytes maintained within normal  limits  Description: INTERVENTIONS:  - Monitor labs and rhythm and assess patient for signs and symptoms of electrolyte imbalances  - Administer electrolyte replacement as ordered  - Monitor response to electrolyte replacements, including rhythm and repeat lab results as appropriate  - Fluid restriction as ordered  - Instruct patient on fluid and nutrition restrictions as appropriate  Outcome: Progressing  Goal: Hemodynamic stability and optimal renal function maintained  Description: INTERVENTIONS:  - Monitor labs and assess for signs and symptoms of volume excess or deficit  - Monitor intake, output and patient weight  - Monitor urine specific gravity, serum osmolarity and serum sodium as indicated or ordered  - Monitor response to interventions for patient's volume status, including labs, urine output, blood pressure (other measures as available)  - Encourage oral intake as appropriate  - Instruct patient on fluid and nutrition restrictions as appropriate  Outcome: Progressing     Problem: HEMATOLOGIC - ADULT  Goal: Maintains hematologic stability  Description: INTERVENTIONS  - Assess for signs and symptoms of bleeding or hemorrhage  - Monitor labs and vital signs for trends  - Administer supportive blood products/factors, fluids and medications as ordered and appropriate  - Administer supportive blood products/factors as ordered and appropriate  Outcome: Progressing     Problem: MUSCULOSKELETAL - ADULT  Goal: Return mobility to safest level of function  Description: INTERVENTIONS:  - Assess patient stability and activity tolerance for standing, transferring and ambulating w/ or w/o assistive devices  - Assist with transfers and ambulation using safe patient handling equipment as needed  - Ensure adequate protection for wounds/incisions during mobilization  - Obtain PT/OT consults as needed  - Advance activity as appropriate  - Communicate ordered activity level and limitations with patient/family  Outcome:  Progressing     Problem: Impaired Functional Mobility  Goal: Achieve highest/safest level of mobility/gait  Description: Interventions:  - Assess patient's functional ability and stability  - Promote increasing activity/tolerance for mobility and gait  - Educate and engage patient/family in tolerated activity level and precautions  Outcome: Progressing

## 2024-06-16 LAB
ALBUMIN SERPL-MCNC: 3.3 G/DL (ref 3.4–5)
ANION GAP SERPL CALC-SCNC: 8 MMOL/L (ref 0–18)
BUN BLD-MCNC: 49 MG/DL (ref 9–23)
CALCIUM BLD-MCNC: 8.2 MG/DL (ref 8.5–10.1)
CHLORIDE SERPL-SCNC: 105 MMOL/L (ref 98–112)
CO2 SERPL-SCNC: 25 MMOL/L (ref 21–32)
CREAT BLD-MCNC: 6.87 MG/DL
EGFRCR SERPLBLD CKD-EPI 2021: 9 ML/MIN/1.73M2 (ref 60–?)
GLUCOSE BLD-MCNC: 126 MG/DL (ref 70–99)
OSMOLALITY SERPL CALC.SUM OF ELEC: 301 MOSM/KG (ref 275–295)
PHOSPHATE SERPL-MCNC: 4.4 MG/DL (ref 2.5–4.9)
POTASSIUM SERPL-SCNC: 3.5 MMOL/L (ref 3.5–5.1)
SODIUM SERPL-SCNC: 138 MMOL/L (ref 136–145)

## 2024-06-16 PROCEDURE — 94640 AIRWAY INHALATION TREATMENT: CPT

## 2024-06-16 PROCEDURE — 80069 RENAL FUNCTION PANEL: CPT | Performed by: INTERNAL MEDICINE

## 2024-06-16 PROCEDURE — 90935 HEMODIALYSIS ONE EVALUATION: CPT | Performed by: INTERNAL MEDICINE

## 2024-06-16 RX ORDER — ALBUTEROL SULFATE 2.5 MG/3ML
2.5 SOLUTION RESPIRATORY (INHALATION) ONCE
Status: COMPLETED | OUTPATIENT
Start: 2024-06-16 | End: 2024-06-16

## 2024-06-16 RX ORDER — HEPARIN SODIUM 1000 [USP'U]/ML
1.5 INJECTION, SOLUTION INTRAVENOUS; SUBCUTANEOUS
Status: DISCONTINUED | OUTPATIENT
Start: 2024-06-16 | End: 2024-06-17

## 2024-06-16 RX ORDER — ALBUTEROL SULFATE 2.5 MG/3ML
2.5 SOLUTION RESPIRATORY (INHALATION) EVERY 6 HOURS PRN
Status: DISCONTINUED | OUTPATIENT
Start: 2024-06-16 | End: 2024-06-17

## 2024-06-16 RX ORDER — ALBUMIN (HUMAN) 12.5 G/50ML
25 SOLUTION INTRAVENOUS
Status: DISCONTINUED | OUTPATIENT
Start: 2024-06-16 | End: 2024-06-17

## 2024-06-16 RX ORDER — DEXTROAMPHETAMINE SACCHARATE, AMPHETAMINE ASPARTATE, DEXTROAMPHETAMINE SULFATE AND AMPHETAMINE SULFATE 2.5; 2.5; 2.5; 2.5 MG/1; MG/1; MG/1; MG/1
20 TABLET ORAL DAILY
Status: DISCONTINUED | OUTPATIENT
Start: 2024-06-17 | End: 2024-06-17

## 2024-06-16 RX ORDER — PROCHLORPERAZINE EDISYLATE 5 MG/ML
10 INJECTION INTRAMUSCULAR; INTRAVENOUS EVERY 6 HOURS PRN
Status: DISCONTINUED | OUTPATIENT
Start: 2024-06-16 | End: 2024-06-17

## 2024-06-16 NOTE — PROGRESS NOTES
[facility]   part of [facility] Hospitalist Progress Note     Godwin Fonseca Patient Status:  Inpatient    1980 MRN [id number]   Location [facility] 8NE-A Attending Ria Gutierrez MD   Hosp Day # 2 PCP Prieto Novak MD     Subjective:     Patient seen and examined.   Feels better  Less pain  Sleepy  Plan to get HD today    Objective:    Review of Systems:   10 point ROS completed and was negative, except for pertinent positive and negatives stated in subjective.    Vital signs:  Temp:  [97.9 °F (36.6 °C)-98.7 °F (37.1 °C)] 98.2 °F (36.8 °C)  Pulse:  [76-89] 89  Resp:  [17-21] 20  BP: (100-149)/(53-92) 137/74  SpO2:  [93 %-100 %] 95 %    Physical Exam:    General: No acute distress.   HEENT:  EOMI, PERRLA, OP clear  Respiratory: Clear to auscultation bilaterally. No wheezes. No rhonchi.  Cardiovascular: S1, S2. Regular rate and rhythm. No murmurs.  Abdomen: Soft, nontender, nondistended.  Positive bowel sounds. No rebound or guarding.  Extremities: No edema.  Neuro:  Grossly non focal, no motor deficits.        Diagnostic Data:    Labs:  Recent Labs   Lab 06/14/24  1659 06/15/24  0417   WBC 7.8 5.9   HGB 8.8* 10.3*   MCV 96.7 91.2   .0 273.0       Recent Labs   Lab 24  1659 06/15/24  0918 24  0554   * 115* 126*   BUN 95* 39* 49*   CREATSERUM 9.60* 5.74* 6.87*   CA 7.3* 8.4* 8.2*   ALB 3.0* 3.4 3.3*    141 138   K 4.4 3.3* 3.5    105 105   CO2 15.0* 28.0 25.0   ALKPHO 86  --   --    AST 50*  --   --    ALT 38  --   --    BILT 0.3  --   --    TP 6.9  --   --        Estimated Creatinine Clearance: 15.6 mL/min (A) (based on SCr of 6.87 mg/dL (H)).    No results for input(s): \"PTP\", \"INR\" in the last 168 hours.         COVID-19 Lab Results    COVID-19  Lab Results   Component Value Date    COVID19 Not Detected 05/15/2024    COVID19 Not Detected 2024    COVID19 Not Detected 2024       Pro-Calcitonin  Recent Labs   Lab  06/15/24  0918   PCT 0.50*       Cardiac  Recent Labs   Lab 06/14/24  1659   PBNP 23,236*       Creatinine Kinase  No results for input(s): \"CK\" in the last 168 hours.    Inflammatory Markers  No results for input(s): \"CRP\", \"HARJEET\", \"LDH\", \"DDIMER\" in the last 168 hours.    Imaging: Imaging data reviewed in Epic.    Medications:    bisacodyl  15 mg Oral Daily    fenofibrate micronized  134 mg Oral Nightly    fluticasone propionate  1 spray Each Nare Daily    amphetamine-dextroamphetamine  10 mg Oral BID AC    polyethylene glycol (PEG 3350)  17 g Oral BID    lidocaine-menthol  1 patch Transdermal Daily    heparin  5,000 Units Subcutaneous Q8H MARTHA    ARIPiprazole  5 mg Oral Daily    buPROPion ER  150 mg Oral Daily    carvedilol  25 mg Oral BID with meals    cloNIDine  0.1 mg Oral Daily with food    FLUoxetine HCl  40 mg Oral Daily    hydrALAZINE  50 mg Oral BID    isosorbide mononitrate ER  30 mg Oral Daily    lamoTRIgine  150 mg Oral Daily    losartan  100 mg Oral Daily    NIFEdipine ER  60 mg Oral BID    sevelamer carbonate  800 mg Oral TID CC       Assessment & Plan:    Godwin Fonseca is a 44 year old male with PMH significant for ESRD on HD, bipolar, depression, DM 2, HTN, DL, CAD, COPD, HFpEF, chronic abdominal pain presents to the ED w/ missed HD sessions and pain.         Missed HD sessions  ESRD on HD  -HD T/TH/Sa  -nephrology consulted  -HD yest and again today     Recent Pneumonia  -completed abx  -on O2 here >> will attempt to wean off, unclear if pt was hypoxic  -cxr shows improved r sided infiltrate  -procal slightly elevated,  but HD pt, labs normal, lungs CTAB, pt not coughing  -hold off on abx for now  -lidocaine patch to right rib cage    Chronic pain  Likely drug use dependance  -Follows with pain clinic outpatient  -Resume home pain medication.  -iv pain meds stopped    Anemia  -chronic - hgb 10.3 this am  -hx of GI bleed     HTN  DL  CAD  HFpEF  -resume home meds     DM2  -hold po meds  -ISS  +accuchecks     COPD  -no evidence of acute exacerbation  -resume home inhalers     MDD  Bipolar   -resume home meds           Quality:  DVT Prophylaxis: heparin  CODE status: full code  Abbott: no  If COVID testing is negative, may discontinue isolation: yes      DISPO:  Dc planning in process  Plan for HD again today  Prob dc tomorrow    Plan of care discussed with pt and rn and all questions answered.           Ria Gutierrez MD  [facility]  Pager [phone]  Answering Service number: [phone]

## 2024-06-16 NOTE — PLAN OF CARE
Assumed care at change of shift at 1930.    Patient alert and orientated x 4. Maintaining oxygen saturations on 3L via nasal cannula. Continent of bowel/bladder. Last bowel movement 6/14. Normal sinus with first degree AV block and BBB on tele monitor. Complaints of pain- PRN Katy given per order. Up with standby assist. Patient updated with plan of care.     Bed locked in lowest position with bed alarm on. Call light and personal belongings within reach.    Problem: Patient/Family Goals  Goal: Patient/Family Long Term Goal  Description: Patient's Long Term Goal: Stay out of the hospital    Interventions:  - Attend follow up appointments  - Follow HD schedule outpatient  - Take medications as ordered  - See additional Care Plan goals for specific interventions  Outcome: Progressing  Goal: Patient/Family Short Term Goal  Description: Patient's Short Term Goal: Pain control, breathe better    Interventions:   - Labs, tele, conults  - PRN pain medicine  - Resume home medications  - See additional Care Plan goals for specific interventions  Outcome: Progressing     Problem: CARDIOVASCULAR - ADULT  Goal: Maintains optimal cardiac output and hemodynamic stability  Description: INTERVENTIONS:  - Monitor vital signs, rhythm, and trends  - Monitor for bleeding, hypotension and signs of decreased cardiac output  - Evaluate effectiveness of vasoactive medications to optimize hemodynamic stability  - Monitor arterial and/or venous puncture sites for bleeding and/or hematoma  - Assess quality of pulses, skin color and temperature  - Assess for signs of decreased coronary artery perfusion - ex. Angina  - Evaluate fluid balance, assess for edema, trend weights  Outcome: Progressing  Goal: Absence of cardiac arrhythmias or at baseline  Description: INTERVENTIONS:  - Continuous cardiac monitoring, monitor vital signs, obtain 12 lead EKG if indicated  - Evaluate effectiveness of antiarrhythmic and heart rate control medications as  ordered  - Initiate emergency measures for life threatening arrhythmias  - Monitor electrolytes and administer replacement therapy as ordered  Outcome: Progressing     Problem: RESPIRATORY - ADULT  Goal: Achieves optimal ventilation and oxygenation  Description: INTERVENTIONS:  - Assess for changes in respiratory status  - Assess for changes in mentation and behavior  - Position to facilitate oxygenation and minimize respiratory effort  - Oxygen supplementation based on oxygen saturation or ABGs  - Provide Smoking Cessation handout, if applicable  - Encourage broncho-pulmonary hygiene including cough, deep breathe, Incentive Spirometry  - Assess the need for suctioning and perform as needed  - Assess and instruct to report SOB or any respiratory difficulty  - Respiratory Therapy support as indicated  - Manage/alleviate anxiety  - Monitor for signs/symptoms of CO2 retention  Outcome: Progressing     Problem: GASTROINTESTINAL - ADULT  Goal: Minimal or absence of nausea and vomiting  Description: INTERVENTIONS:  - Maintain adequate hydration with IV or PO as ordered and tolerated  - Nasogastric tube to low intermittent suction as ordered  - Evaluate effectiveness of ordered antiemetic medications  - Provide nonpharmacologic comfort measures as appropriate  - Advance diet as tolerated, if ordered  - Obtain nutritional consult as needed  - Evaluate fluid balance  Outcome: Progressing  Goal: Maintains or returns to baseline bowel function  Description: INTERVENTIONS:  - Assess bowel function  - Maintain adequate hydration with IV or PO as ordered and tolerated  - Evaluate effectiveness of GI medications  - Encourage mobilization and activity  - Obtain nutritional consult as needed  - Establish a toileting routine/schedule  - Consider collaborating with pharmacy to review patient's medication profile  Outcome: Progressing  Goal: Maintains adequate nutritional intake (undernourished)  Description: INTERVENTIONS:  - Monitor  percentage of each meal consumed  - Identify factors contributing to decreased intake, treat as appropriate  - Assist with meals as needed  - Monitor I&O, WT and lab values  - Obtain nutritional consult as needed  - Optimize oral hygiene and moisture  - Encourage food from home; allow for food preferences  - Enhance eating environment  Outcome: Progressing  Goal: Achieves appropriate nutritional intake (bariatric)  Description: INTERVENTIONS:  - Monitor for over-consumption  - Identify factors contributing to increased intake, treat as appropriate  - Monitor I&O, WT and lab values  - Obtain nutritional consult as needed  - Evaluate psychosocial factors contributing to over-consumption  Outcome: Progressing     Problem: GENITOURINARY - ADULT  Goal: Absence of urinary retention  Description: INTERVENTIONS:  - Assess patient’s ability to void and empty bladder  - Monitor intake/output and perform bladder scan as needed  - Follow urinary retention protocol/standard of care  - Consider collaborating with pharmacy to review patient's medication profile  - Implement strategies to promote bladder emptying  Outcome: Progressing     Problem: METABOLIC/FLUID AND ELECTROLYTES - ADULT  Goal: Glucose maintained within prescribed range  Description: INTERVENTIONS:  - Monitor Blood Glucose as ordered  - Assess for signs and symptoms of hyperglycemia and hypoglycemia  - Administer ordered medications to maintain glucose within target range  - Assess barriers to adequate nutritional intake and initiate nutrition consult as needed  - Instruct patient on self management of diabetes  Outcome: Progressing  Goal: Electrolytes maintained within normal limits  Description: INTERVENTIONS:  - Monitor labs and rhythm and assess patient for signs and symptoms of electrolyte imbalances  - Administer electrolyte replacement as ordered  - Monitor response to electrolyte replacements, including rhythm and repeat lab results as appropriate  - Fluid  restriction as ordered  - Instruct patient on fluid and nutrition restrictions as appropriate  Outcome: Progressing  Goal: Hemodynamic stability and optimal renal function maintained  Description: INTERVENTIONS:  - Monitor labs and assess for signs and symptoms of volume excess or deficit  - Monitor intake, output and patient weight  - Monitor urine specific gravity, serum osmolarity and serum sodium as indicated or ordered  - Monitor response to interventions for patient's volume status, including labs, urine output, blood pressure (other measures as available)  - Encourage oral intake as appropriate  - Instruct patient on fluid and nutrition restrictions as appropriate  Outcome: Progressing     Problem: HEMATOLOGIC - ADULT  Goal: Maintains hematologic stability  Description: INTERVENTIONS  - Assess for signs and symptoms of bleeding or hemorrhage  - Monitor labs and vital signs for trends  - Administer supportive blood products/factors, fluids and medications as ordered and appropriate  - Administer supportive blood products/factors as ordered and appropriate  Outcome: Progressing     Problem: MUSCULOSKELETAL - ADULT  Goal: Return mobility to safest level of function  Description: INTERVENTIONS:  - Assess patient stability and activity tolerance for standing, transferring and ambulating w/ or w/o assistive devices  - Assist with transfers and ambulation using safe patient handling equipment as needed  - Ensure adequate protection for wounds/incisions during mobilization  - Obtain PT/OT consults as needed  - Advance activity as appropriate  - Communicate ordered activity level and limitations with patient/family  Outcome: Progressing     Problem: Impaired Functional Mobility  Goal: Achieve highest/safest level of mobility/gait  Description: Interventions:  - Assess patient's functional ability and stability  - Promote increasing activity/tolerance for mobility and gait  - Educate and engage patient/family in  tolerated activity level and precautions

## 2024-06-16 NOTE — PROGRESS NOTES
[facility]   part of [facility]    Nephrology Progress Note    Godwin Fonseca Attending:  Ria Gutierrez MD       SUBJECTIVE:     Feels pain is overall improving.    PHYSICAL EXAM:     Vital Signs: /65 (BP Location: Right arm)   Pulse 87   Temp 97.6 °F (36.4 °C) (Oral)   Resp 16   Ht 188 cm (6' 2\")   Wt 246 lb 7.6 oz (111.8 kg)   SpO2 91%   BMI 31.65 kg/m²   Temp (24hrs), Av.1 °F (36.7 °C), Min:97.6 °F (36.4 °C), Max:98.7 °F (37.1 °C)       Intake/Output Summary (Last 24 hours) at 2024 1430  Last data filed at 2024 0900  Gross per 24 hour   Intake 540 ml   Output 350 ml   Net 190 ml     Wt Readings from Last 3 Encounters:   24 246 lb 7.6 oz (111.8 kg)   24 238 lb (108 kg)   24 238 lb (108 kg)       General: pleasant, well appearing  Skin: no visible rashes  HEENT: NCAT  Cardiac: Regular rate and rhythm, no murmur/gallop or rub  Lungs: ctab  Abdomen: Soft, NTND  Extremities: warm, well perfused, no leg edema  Neurologic/Psych: mentating well,  RIJ CVC  LUE AVF + thrill and bruit       LABORATORY DATA:     Lab Results   Component Value Date     (H) 2024    BUN 49 (H) 2024    BUNCREA 13.0 2022    CREATSERUM 6.87 (H) 2024    ANIONGAP 8 2024    GFR 59 (L) 2018    GFRNAA 30 (L) 2022    GFRAA 35 (L) 2022    CA 8.2 (L) 2024    OSMOCALC 301 (H) 2024    ALKPHO 86 2024    AST 50 (H) 2024    ALT 38 2024    BILT 0.3 2024    TP 6.9 2024    ALB 3.3 (L) 2024    GLOBULIN 3.9 2024     2024    K 3.5 2024     2024    CO2 25.0 2024     Lab Results   Component Value Date    WBC 5.9 06/15/2024    RBC 3.18 (L) 06/15/2024    HGB 10.3 (L) 06/15/2024    HCT 29.0 (L) 06/15/2024    .0 06/15/2024    MPV 11.5 2012    MCV 91.2 06/15/2024    MCH 32.4 06/15/2024    MCHC 35.5 06/15/2024    RDW 13.2 06/15/2024    NEPRELIM 3.70 06/15/2024     NEPERCENT 62.6 06/15/2024    LYPERCENT 18.4 06/15/2024    MOPERCENT 13.2 06/15/2024    EOPERCENT 4.1 06/15/2024    BAPERCENT 1.5 06/15/2024    NE 3.70 06/15/2024    LYMABS 1.09 06/15/2024    MOABSO 0.78 06/15/2024    EOABSO 0.24 06/15/2024    BAABSO 0.09 06/15/2024     Lab Results   Component Value Date    MALBP 167.00 05/24/2023    CREUR 142.00 05/24/2023    CREUR 142.00 05/24/2023     Lab Results   Component Value Date    COLORUR Yellow 01/03/2024    CLARITY Clear 01/03/2024    SPECGRAVITY 1.025 01/03/2024    GLUUR Negative 01/03/2024    BILUR Negative 01/03/2024    KETUR Trace (A) 01/03/2024    BLOODURINE Negative 01/03/2024    PHURINE 5.5 01/03/2024    PROUR >=300 (A) 01/03/2024    UROBILINOGEN 0.2 01/03/2024    NITRITE Negative 01/03/2024    LEUUR Negative 01/03/2024    WBCUR 6-10 (A) 01/03/2024    RBCUR 0-2 01/03/2024    EPIUR Few (A) 01/03/2024    BACUR Rare (A) 01/03/2024    CAOXUR Occasional (A) 04/20/2018    HYLUR Present (A) 05/14/2019         IMAGING:     Reviewed.    MEDICATIONS:       Current Facility-Administered Medications   Medication Dose Route Frequency    albuterol (Ventolin) (2.5 MG/3ML) 0.083% nebulizer solution 2.5 mg  2.5 mg Nebulization Q6H PRN    sodium chloride 0.9 % IV bolus 100 mL  100 mL Intravenous Q30 Min PRN    And    albumin human (Albumin) 25% injection 25 g  25 g Intravenous PRN Dialysis    heparin (Porcine) 1000 UNIT/ML injection 1,500 Units  1.5 mL Intracatheter Once    heparin (Porcine) 1000 UNIT/ML injection 3,600 Units  3,600 Units Intravenous PRN Dialysis    HYDROcodone-acetaminophen (Norco) 5-325 MG per tab 1 tablet  1 tablet Oral Q4H PRN    Or    HYDROcodone-acetaminophen (Norco) 5-325 MG per tab 2 tablet  2 tablet Oral Q4H PRN    bisacodyl (Dulcolax) DR tab 15 mg  15 mg Oral Daily    docusate sodium (Colace) cap 100 mg  100 mg Oral BID PRN    fenofibrate micronized (Lofibra) cap 134 mg  134 mg Oral Nightly    fluticasone propionate (Flonase) 50 MCG/ACT nasal suspension 1  spray  1 spray Each Nare Daily    amphetamine-dextroamphetamine (Adderall) tab 10 mg  10 mg Oral BID AC    polyethylene glycol (PEG 3350) (Miralax) 17 g oral packet 17 g  17 g Oral BID    lidocaine-menthol 4-1 % patch 1 patch  1 patch Transdermal Daily    heparin (Porcine) 5000 UNIT/ML injection 5,000 Units  5,000 Units Subcutaneous Q8H MARTHA    acetaminophen (Tylenol Extra Strength) tab 500 mg  500 mg Oral Q4H PRN    ondansetron (Zofran) 4 MG/2ML injection 4 mg  4 mg Intravenous Q6H PRN    prochlorperazine (Compazine) 10 MG/2ML injection 5 mg  5 mg Intravenous Q8H PRN    polyethylene glycol (PEG 3350) (Miralax) 17 g oral packet 17 g  17 g Oral Daily PRN    sennosides (Senokot) tab 17.2 mg  17.2 mg Oral Nightly PRN    bisacodyl (Dulcolax) 10 MG rectal suppository 10 mg  10 mg Rectal Daily PRN    melatonin tab 3 mg  3 mg Oral Nightly PRN    ARIPiprazole (Abilify) tab 5 mg  5 mg Oral Daily    buPROPion ER (Wellbutrin XL) 24 hr tab 150 mg  150 mg Oral Daily    carvedilol (Coreg) tab 25 mg  25 mg Oral BID with meals    cloNIDine (Catapres) tab 0.1 mg  0.1 mg Oral Daily with food    dicyclomine (Bentyl) cap 10 mg  10 mg Oral TID PRN    FLUoxetine (PROzac) cap 40 mg  40 mg Oral Daily    hydrALAZINE (Apresoline) tab 50 mg  50 mg Oral BID    isosorbide mononitrate ER (Imdur) 24 hr tab 30 mg  30 mg Oral Daily    lamoTRIgine (LaMICtal) tab 150 mg  150 mg Oral Daily    losartan (Cozaar) tab 100 mg  100 mg Oral Daily    NIFEdipine ER (Procardia-XL) 24 hr tab 60 mg  60 mg Oral BID    sevelamer carbonate (Renvela) tab 800 mg  800 mg Oral TID CC       ASSESSMENT/PLAN:     45 yo M with history of ESRD TTS via RIJ tunnelled CVC ([facility]-Dr. Singletary), LUE AVF placement last week, hyperaldosteronism with history of hypertensive urgency, DM, bipolar disorder, asthma, chronic abd pain presented with shortness of breath in the setting of missed HD treatment and recent pna. Also presenting with diarrhea.     ESRD:  -- s/p HD  Friday  -- HD today then resume TTS schedule      LUE AVF:  -- L arm precautions  -- not ready to use     Anemia:  -- defer OWEN until BP more stable and Hb < 10     Ssecondary hyperparathyroidism, hypocalcemia:  -- start calcitriol 0.25 every other day     HTN:  -- resumed home meds     Diarrhea:  -- work up per primary     Recent pna, lower chest pain:  --defer further imaging to primary     Will follow.        Thank you for allowing me to participate in the care of this patient. Please do not hesitate to call with questions or concerns.        Lenka Bond MD  [facility]  [address] [place] [id number]    6/16/2024  2:30 PM

## 2024-06-16 NOTE — PLAN OF CARE
Assumed care of patient @ 0730 patient resting in bed, AOX4, reports moderate to severe headache, back pain, prn medication provided. Lung sounds clear, diminished bilaterally, O2 saturation adequate on room air. No complaints of shortness of breath or chest pain at this time, pt states breathing has improved since admission. Sinus rhythm on tele, S1-S2 present, no adventitious sounds noted. Bowel sounds present and active in all quadrants, last bowel movement 6/14/2024 per pt. Patient voiding with decreased frequency due to hemodialysis. Non-pitting edema to BLE. Isolation discontinued today due to lack of BM >24 hours.     Plan of Care: HD per renal. BP control. Pain control.      Discussed plan of care with patient, verbalized understanding. Call light within reach.    Problem: Patient/Family Goals  Goal: Patient/Family Long Term Goal  Description: Patient's Long Term Goal: Stay out of the hospital    Interventions:  - Attend follow up appointments  - Follow HD schedule outpatient  - Take medications as ordered  - See additional Care Plan goals for specific interventions  Outcome: Progressing  Goal: Patient/Family Short Term Goal  Description: Patient's Short Term Goal: Pain control, breathe better    Interventions:   - Labs, tele, conults  - PRN pain medicine  - Resume home medications  - See additional Care Plan goals for specific interventions  Outcome: Progressing     Problem: CARDIOVASCULAR - ADULT  Goal: Maintains optimal cardiac output and hemodynamic stability  Description: INTERVENTIONS:  - Monitor vital signs, rhythm, and trends  - Monitor for bleeding, hypotension and signs of decreased cardiac output  - Evaluate effectiveness of vasoactive medications to optimize hemodynamic stability  - Monitor arterial and/or venous puncture sites for bleeding and/or hematoma  - Assess quality of pulses, skin color and temperature  - Assess for signs of decreased coronary artery perfusion - ex. Angina  - Evaluate  fluid balance, assess for edema, trend weights  Outcome: Progressing  Goal: Absence of cardiac arrhythmias or at baseline  Description: INTERVENTIONS:  - Continuous cardiac monitoring, monitor vital signs, obtain 12 lead EKG if indicated  - Evaluate effectiveness of antiarrhythmic and heart rate control medications as ordered  - Initiate emergency measures for life threatening arrhythmias  - Monitor electrolytes and administer replacement therapy as ordered  Outcome: Progressing     Problem: RESPIRATORY - ADULT  Goal: Achieves optimal ventilation and oxygenation  Description: INTERVENTIONS:  - Assess for changes in respiratory status  - Assess for changes in mentation and behavior  - Position to facilitate oxygenation and minimize respiratory effort  - Oxygen supplementation based on oxygen saturation or ABGs  - Provide Smoking Cessation handout, if applicable  - Encourage broncho-pulmonary hygiene including cough, deep breathe, Incentive Spirometry  - Assess the need for suctioning and perform as needed  - Assess and instruct to report SOB or any respiratory difficulty  - Respiratory Therapy support as indicated  - Manage/alleviate anxiety  - Monitor for signs/symptoms of CO2 retention  Outcome: Progressing     Problem: GASTROINTESTINAL - ADULT  Goal: Minimal or absence of nausea and vomiting  Description: INTERVENTIONS:  - Maintain adequate hydration with IV or PO as ordered and tolerated  - Nasogastric tube to low intermittent suction as ordered  - Evaluate effectiveness of ordered antiemetic medications  - Provide nonpharmacologic comfort measures as appropriate  - Advance diet as tolerated, if ordered  - Obtain nutritional consult as needed  - Evaluate fluid balance  Outcome: Progressing  Goal: Maintains or returns to baseline bowel function  Description: INTERVENTIONS:  - Assess bowel function  - Maintain adequate hydration with IV or PO as ordered and tolerated  - Evaluate effectiveness of GI medications  -  Encourage mobilization and activity  - Obtain nutritional consult as needed  - Establish a toileting routine/schedule  - Consider collaborating with pharmacy to review patient's medication profile  Outcome: Progressing  Goal: Maintains adequate nutritional intake (undernourished)  Description: INTERVENTIONS:  - Monitor percentage of each meal consumed  - Identify factors contributing to decreased intake, treat as appropriate  - Assist with meals as needed  - Monitor I&O, WT and lab values  - Obtain nutritional consult as needed  - Optimize oral hygiene and moisture  - Encourage food from home; allow for food preferences  - Enhance eating environment  Outcome: Progressing  Goal: Achieves appropriate nutritional intake (bariatric)  Description: INTERVENTIONS:  - Monitor for over-consumption  - Identify factors contributing to increased intake, treat as appropriate  - Monitor I&O, WT and lab values  - Obtain nutritional consult as needed  - Evaluate psychosocial factors contributing to over-consumption  Outcome: Progressing     Problem: GENITOURINARY - ADULT  Goal: Absence of urinary retention  Description: INTERVENTIONS:  - Assess patient’s ability to void and empty bladder  - Monitor intake/output and perform bladder scan as needed  - Follow urinary retention protocol/standard of care  - Consider collaborating with pharmacy to review patient's medication profile  - Implement strategies to promote bladder emptying  Outcome: Progressing     Problem: METABOLIC/FLUID AND ELECTROLYTES - ADULT  Goal: Glucose maintained within prescribed range  Description: INTERVENTIONS:  - Monitor Blood Glucose as ordered  - Assess for signs and symptoms of hyperglycemia and hypoglycemia  - Administer ordered medications to maintain glucose within target range  - Assess barriers to adequate nutritional intake and initiate nutrition consult as needed  - Instruct patient on self management of diabetes  Outcome: Progressing  Goal:  Electrolytes maintained within normal limits  Description: INTERVENTIONS:  - Monitor labs and rhythm and assess patient for signs and symptoms of electrolyte imbalances  - Administer electrolyte replacement as ordered  - Monitor response to electrolyte replacements, including rhythm and repeat lab results as appropriate  - Fluid restriction as ordered  - Instruct patient on fluid and nutrition restrictions as appropriate  Outcome: Progressing  Goal: Hemodynamic stability and optimal renal function maintained  Description: INTERVENTIONS:  - Monitor labs and assess for signs and symptoms of volume excess or deficit  - Monitor intake, output and patient weight  - Monitor urine specific gravity, serum osmolarity and serum sodium as indicated or ordered  - Monitor response to interventions for patient's volume status, including labs, urine output, blood pressure (other measures as available)  - Encourage oral intake as appropriate  - Instruct patient on fluid and nutrition restrictions as appropriate  Outcome: Progressing     Problem: HEMATOLOGIC - ADULT  Goal: Maintains hematologic stability  Description: INTERVENTIONS  - Assess for signs and symptoms of bleeding or hemorrhage  - Monitor labs and vital signs for trends  - Administer supportive blood products/factors, fluids and medications as ordered and appropriate  - Administer supportive blood products/factors as ordered and appropriate  Outcome: Progressing     Problem: MUSCULOSKELETAL - ADULT  Goal: Return mobility to safest level of function  Description: INTERVENTIONS:  - Assess patient stability and activity tolerance for standing, transferring and ambulating w/ or w/o assistive devices  - Assist with transfers and ambulation using safe patient handling equipment as needed  - Ensure adequate protection for wounds/incisions during mobilization  - Obtain PT/OT consults as needed  - Advance activity as appropriate  - Communicate ordered activity level and  limitations with patient/family  Outcome: Progressing     Problem: Impaired Functional Mobility  Goal: Achieve highest/safest level of mobility/gait  Description: Interventions:  - Assess patient's functional ability and stability  - Promote increasing activity/tolerance for mobility and gait  - Educate and engage patient/family in tolerated activity level and precautions  Outcome: Progressing

## 2024-06-17 VITALS
HEIGHT: 74 IN | HEART RATE: 90 BPM | RESPIRATION RATE: 16 BRPM | OXYGEN SATURATION: 94 % | SYSTOLIC BLOOD PRESSURE: 118 MMHG | WEIGHT: 246.5 LBS | DIASTOLIC BLOOD PRESSURE: 73 MMHG | TEMPERATURE: 98 F | BODY MASS INDEX: 31.63 KG/M2

## 2024-06-17 LAB
ALBUMIN SERPL-MCNC: 3.1 G/DL (ref 3.4–5)
ANION GAP SERPL CALC-SCNC: 7 MMOL/L (ref 0–18)
BUN BLD-MCNC: 28 MG/DL (ref 9–23)
CALCIUM BLD-MCNC: 8.2 MG/DL (ref 8.5–10.1)
CHLORIDE SERPL-SCNC: 103 MMOL/L (ref 98–112)
CO2 SERPL-SCNC: 28 MMOL/L (ref 21–32)
CREAT BLD-MCNC: 4.75 MG/DL
EGFRCR SERPLBLD CKD-EPI 2021: 14 ML/MIN/1.73M2 (ref 60–?)
GLUCOSE BLD-MCNC: 158 MG/DL (ref 70–99)
OSMOLALITY SERPL CALC.SUM OF ELEC: 295 MOSM/KG (ref 275–295)
PHOSPHATE SERPL-MCNC: 3.8 MG/DL (ref 2.5–4.9)
POTASSIUM SERPL-SCNC: 3.5 MMOL/L (ref 3.5–5.1)
SODIUM SERPL-SCNC: 138 MMOL/L (ref 136–145)

## 2024-06-17 PROCEDURE — 80069 RENAL FUNCTION PANEL: CPT | Performed by: INTERNAL MEDICINE

## 2024-06-17 NOTE — PAYOR COMM NOTE
--------------  ADMISSION REVIEW     Payor: [employer]  Subscriber #:  [id number]  Authorization Number: [id number]    Admit date: 6/14/24  Admit time:  9:31 PM       REVIEW DOCUMENTATION:     Patient Seen in: [facility] In [place]      History     Chief Complaint   Patient presents with    Difficulty Breathing    Back Pain     Stated Complaint: Pt with c/o difficulty breathing that started last night, but became worse     Subjective:   HPI    44-year-old male who has a known history of end-stage renal disease on dialysis, congestive heart failure, COPD as well as history of DVT in the remote past presents with shortness of breath.  He denies any fevers or chills.  He states he is also having some back pain which is chronic for him.  He states he was recently discharged from [facility] and was told at that time he had a pneumonia on the right side.  Patient is to be dialyzed on Tuesdays Thursdays and Saturdays but did not get dialysis yesterday.  He did not contact his nephrologist to change dialysis date or make any attempts to go today.    Objective:   Past Medical History:    Asthma ([facility])    Attention deficit hyperactivity disorder (ADHD)    Back problem    Bipolar 1 disorder (HCC)    CKD (chronic kidney disease) stage 3, GFR 30-59 ml/min ([facility])    Dr Meeks    Congestive heart disease ([facility])    COPD (chronic obstructive pulmonary disease) ([facility])    Coronary atherosclerosis    Deep vein thrombosis ([facility])    at age 17 R/T cast    Depression    Diabetes ([facility])    Essential hypertension    3/21 echo: severe concentric LVH with normal EF and no MR or pHTN    Extrinsic asthma, unspecified    Heart attack ([facility])    2016- angiogram- no intervention    Heart valve disease    mitral valve repair in 1994/    High blood pressure    High cholesterol    History of mitral valve repair    Hyperlipidemia    Low back pain    tight and stiff after sweeping and mopping    LVH (left  ventricular hypertrophy)    Migraines    Mixed hyperlipidemia     HDL 38 LDL 97 VLDL 57     Monoclonal gammopathy    IgG kappa     MVP (mitral valve prolapse)    Repair 1994 at [place]; echoes as recently as 3/21 show mild or trivial MR and no stenosis    Neuropathy    Osteoarthritis    hip ,knees    Pneumonia due to organism    Pulmonary embolism (HCC)    Renal disorder    Stroke (HCC)    TIA (transient ischemic attack)    Initial history of left-sided weakness and slurred speech. (+) cocaine. MRI of the brain, CT angiogram of the head and neck, and 2D echo are all unremarkable.     TMJ (dislocation of temporomandibular joint)    Troponin level elevated    Trop 60 60 47 with TIA and no CP: Lexiscan negative with EF 51       Past Surgical History:   Procedure Laterality Date    Av fistula revision, open Left     Colonoscopy N/A 03/26/2023    Procedure: COLONOSCOPY;  Surgeon: Heath Vu MD;  Location:  ENDOSCOPY    Colonoscopy N/A 12/30/2023    Procedure: COLONOSCOPY with cold snare polypectomy and forcep polypectomy;  Surgeon: Ousmane Suarez MD;  Location:  ENDOSCOPY    Colonoscopy & polypectomy  2019    Egd  2019    Duodenitis. Biopsied. EUS for weight loss was negative    Heart surgery      Hernia surgery  08/17/2022    Dr Barnes    Laminectomy,>2 sgmt,lumbar  09/06/2018    L4-L5 Decomp Discectomy ROEM L4-L5    Mitralplasty w cp bypass  1994    [place]: Repair    Repair rotator cuff,chronic Left     torn and had a ruptured bicep    Valve repair  1994    mitral valve     Physical Exam     ED Triage Vitals   BP 06/14/24 1621 (!) 183/121   Pulse 06/14/24 1621 85   Resp 06/14/24 1621 21   Temp 06/14/24 1628 98.3 °F (36.8 °C)   Temp src 06/14/24 1628 Oral   SpO2 06/14/24 1621 94 %   O2 Device 06/14/24 1621 None (Room air)       Current Vitals:   Vital Signs  BP: (!) 184/125  Pulse: 88  Resp: (!) 30  Temp: 97.7 °F (36.5 °C)  Temp src: Temporal    Oxygen Therapy  SpO2: 94 %  O2 Device: Nasal  cannula  O2 Flow Rate (L/min): 2 L/min    Physical Exam  Vitals and nursing note reviewed.   Constitutional:       General: He is not in acute distress.     Appearance: Normal appearance. He is well-developed.      Comments: Appears comfortable lying on his side and using his phone until medical staff walked in the room and then will complain of pain   HENT:      Head: Normocephalic and atraumatic.   Cardiovascular:      Rate and Rhythm: Normal rate and regular rhythm.      Pulses: Normal pulses.      Heart sounds: Normal heart sounds.   Pulmonary:      Breath sounds: No stridor. Rales present.      Comments: Tachypneic, diminished with some rales in the bases  Abdominal:      General: Bowel sounds are normal.      Palpations: Abdomen is soft.   Musculoskeletal:         General: Normal range of motion.      Cervical back: Normal range of motion and neck supple.      Right lower leg: No edema.      Left lower leg: No edema.   Lymphadenopathy:      Cervical: No cervical adenopathy.   Skin:     General: Skin is warm and dry.      Capillary Refill: Capillary refill takes less than 2 seconds.   Neurological:      General: No focal deficit present.      Mental Status: He is alert and oriented to person, place, and time.       ED Course     Labs Reviewed   COMP METABOLIC PANEL (14) - Abnormal; Notable for the following components:       Result Value    Glucose 175 (*)     CO2 15.0 (*)     BUN 95 (*)     Creatinine 9.60 (*)     Calcium, Total 7.3 (*)     Calculated Osmolality 322 (*)     eGFR-Cr 6 (*)     AST 50 (*)     Albumin 3.0 (*)     A/G Ratio 0.8 (*)     All other components within normal limits   PRO BETA NATRIURETIC PEPTIDE - Abnormal; Notable for the following components:    Pro-Beta Natriuretic Peptide 23,236 (*)     All other components within normal limits   CBC W/ DIFFERENTIAL - Abnormal; Notable for the following components:    RBC 2.74 (*)     HGB 8.8 (*)     HCT 26.5 (*)     Lymphocyte Absolute 0.96 (*)      All other components within normal limits      XR CHEST AP PORTABLE  (CPT=71045)    Result Date: 6/14/2024  PROCEDURE:  XR CHEST AP PORTABLE    CONCLUSION:   Right-sided pleural effusion and right basilar consolidation is mildly decreased.  Right-sided pheresis catheter with tip in the SVC.  Sequelae of valve replacement is noted.  No pneumothorax.   LOCATION:  [id number]      Dictated by (CST): Ryan Hernández MD on 6/14/2024 at 5:26 PM     Finalized by (CST): Ryan Hernández MD on 6/14/2024 at 5:27 PM           Disposition and Plan     Clinical Impression:  1. Metabolic acidosis    2. Acute renal failure, unspecified acute renal failure type (HCC)    3. Acute on chronic congestive heart failure, unspecified heart failure type (HCC)         Disposition:  Admit  6/14/2024  6:38 pm    Signed by Elena Ortega MD on 6/14/2024  8:55 PM         HISTORY AND PHYSICAL  6-15-24     History of Present Illness: Godwin Fonseca is a 44 year old male with PMH significant for ESRD on HD, bipolar, depression, DM 2, HTN, DL, CAD, COPD, HFpEF, chronic abdominal pain presents to the ED w/ missed HD sessions and pain. Pt was at [facility] and [facility] in last 2 wks. States he still has pneumonia but not on any abx. Having right sided rib and back/chest pain. Says he hears gurgling sound. Lung cta on exam.     Physical Exam:    BP (!) 162/96 (BP Location: Right arm)   Pulse 90   Temp 98.5 °F (36.9 °C) (Oral)   Resp (!) 31   Ht 6' 2\" (1.88 m)   Wt 241 lb 8 oz (109.5 kg)   SpO2 90%   BMI 31.01 kg/m²   General: No acute distress. Alert and oriented x 3.  HEENT: Normocephalic atraumatic. Moist mucous membranes. EOM-I. PERRLA. Anicteric.  Neck: No lymphadenopathy. No JVD. No carotid bruits.  Respiratory: Clear to auscultation bilaterally. No wheezes. No rhonchi.  Cardiovascular: S1, S2. Regular rate and rhythm. No murmurs, rubs or gallops. Equal pulses.   Chest and Back: No tenderness or deformity.  Abdomen: Soft, nontender,  nondistended.  Positive bowel sounds. No rebound, guarding or organomegaly.  Neurologic: No focal neurological deficits. CNII-XII grossly intact.  Musculoskeletal: Moves all extremities.  Extremities: No edema or cyanosis.  Integument: No rashes or lesions.   Psychiatric: Appropriate mood and affect.        Diagnostic Data:       Labs:       Recent Labs   Lab 06/14/24  1659 06/15/24  0417   WBC 7.8 5.9   HGB 8.8* 10.3*   MCV 96.7 91.2   .0 273.0      ASSESSMENT / PLAN:   Godwin Fonseca is a 44 year old male with PMH significant for ESRD on HD, bipolar, depression, DM 2, HTN, DL, CAD, COPD, HFpEF, chronic abdominal pain presents to the ED w/ missed HD sessions and pain.         Missed HD sessions  ESRD on HD  -HD T/TH/Sa  -nephrology consulted     Recent Pneumonia  -completed abx  -on O2 here >> will attempt to wean off, unclear if pt was hypoxic  -cxr shows improved r sided infiltrate  -check procal, labs normal, lungs CTAB, pt not coughing  -hold off on abx for now  -lidocaine patch to right rib cage    Chronic pain  Likely drug use dependance  -Follows with pain clinic outpatient  -Resume home pain medication.  -iv pain meds stopped    Anemia  -chronic - hgb 10.3 this am  -hx of GI bleed     HTN  DL  CAD  HFpEF  -resume home meds     DM2  -hold po meds  -ISS +accuchecks     COPD  -no evidence of acute exacerbation  -resume home inhalers     MDD  Bipolar   -resume home meds        NEPHROLOGY CONSULT      Underwent HD overnight - tolerated 3L UF. Still has some R sided lower flank/chest discomfort. Also complaining of diarrhea x 2 d.      ASSESSMENT/PLAN:      45 yo M with history of ESRD TTS via RIJ tunnelled CVC ([facility]-Dr. Singletary), LUE AVF placement last week, hyperaldosteronism with history of hypertensive urgency, DM, bipolar disorder, asthma, chronic abd pain presented with shortness of breath in the setting of missed HD treatment and recent pna. Also presenting with diarrhea.     ESRD:  --  s/p HD Friday  -- hold HD today, consider HD tentatively Sunday     NINOSKA AVF:  -- L arm precautions  -- not ready to use     Anemia:  -- defer OWEN until BP more stable and Hb < 10     Ssecondary hyperparathyroidism, hypocalcemia:  -- start calcitriol 0.25 every other day     HTN:  -- resumed home meds     Diarrhea:  -- work up per primary     Recent pna, lower chest pain:  --defer further imaging to primary      Component  Ref Range & Units 6/15/24 0918   Glucose  70 - 99 mg/dL 115 High    Sodium  136 - 145 mmol/L 141   Potassium  3.5 - 5.1 mmol/L 3.3 Low    Chloride  98 - 112 mmol/L 105   CO2  21.0 - 32.0 mmol/L 28.0   Anion Gap  0 - 18 mmol/L 8   BUN  9 - 23 mg/dL 39 High    Creatinine  0.70 - 1.30 mg/dL 5.74 High    Calcium, Total  8.5 - 10.1 mg/dL 8.4 Low    Calculated Osmolality  275 - 295 mOsm/kg 302 High    eGFR-Cr  >=60 mL/min/1.73m2 12 Low    Albumin  3.4 - 5.0 g/dL 3.4   Phosphorus  2.5 - 4.9 mg/dL 4.6     Component  Ref Range & Units 6/15/24 0417   Pth Intact  18.5 - 88.0 pg/mL            Component  Ref Range & Units 6/15/24 0918   Procalcitonin  <0.16 ng/mL 0.50 High         furosemide (Lasix) 10 mg/mL injection 40 mg  Dose: 40 mg  Freq: Once Route: IV  Start: 06/14/24 1646 End: 06/14/24 1719  heparin (Porcine) 5000 UNIT/ML injection 5,000 Units  Dose: 5,000 Units  Freq: Every 8 hours scheduled Route: SC  Start: 06/14/24 2215  hydrALAzine (Apresoline) 20 mg/mL injection 10 mg  Dose: 10 mg  Freq: Once Route: IV  Start: 06/14/24 2036 End: 06/14/24 2038  hydrALAzine (Apresoline) 20 mg/mL injection 10 mg  Dose: 10 mg  Freq: Once Route: IV  Start: 06/14/24 1646 End: 06/14/24 1718  HYDROmorphone (Dilaudid) 1 MG/ML injection 0.5 mg  Dose: 0.5 mg  Freq: Once Route: IV  Start: 06/14/24 2001 End: 06/14/24 2023  HYDROmorphone (Dilaudid) 1 MG/ML injection 1 mg  Dose: 1 mg  Freq: Once Route: IV  Start: 06/14/24 1705 End: 06/14/24 1708  ondansetron (Zofran) 4 MG/2ML injection 4 mg  Dose: 4 mg  Freq: Once Route:  IV  Start: 06/14/24 1713 End: 06/14/24 1714  HYDROmorphone (Dilaudid) 1 MG/ML injection 0.5 mg  Dose: 0.5 mg  Freq: Every 4 hours PRN Route: IV  PRN Reason: mild pain  Start: 06/15/24 0334 End: 0 X 3 6-15   HYDROmorphone (Dilaudid) 1 MG/ML injection 0.5 mg  Dose: 0.5 mg  Freq: Every 30 min PRN Route: IV X 1 6-14       6/14/24 2022 97.7 °F (36.5 °C) 90 27 Abnormal  181/128 Abnormal  93 % -- Nasal cannula 2 L/min DR   06/14/24 1940 -- 93 23 155/100 Abnormal  95 % -- Nasal cannula 2 L/min DR   06/14/24 1828 -- 84 16 147/98 Abnormal  95 % -- Nasal cannula 2 L/min DR   06/14/24 1759 -- 83 16 164/100 Abnormal  93 % -- Nasal cannula 2 L/min RS   06/14/24 1727 -- -- -- -- 95 % -- Nasal cannula 2 L/min DR   06/14/24 1726 -- 87 26 172/106 Abnormal  92 % -- None (Room air) -- DR     06/15/24 1100 -- 79 21 104/65 -- -- -- -- MS   06/15/24 0800 -- -- -- -- -- -- Nasal cannula 2 L/min DL   06/15/24 0747 98.5 °F (36.9 °C) 90 31 Abnormal  162/96 Abnormal  90 % -- Nasal cannula 2 L/min LS   06/15/24 0340 98.7 °F (37.1 °C) 88 19 157/101 Abnormal  98 % 241 lb 8 oz Nasal cannula 2 L/min JH   06/15/24 0250 -- 83 21 151/91 Abnormal  100 % -- -- -- SC   06/15/24 0230 -- 86 18 153/93 Abnormal  100 % -- -- -- SC   06/15/24 0200 -- 81 19 176/95 Abnormal  100 % -- -- -- SC   06/15/24 0100 -- 78 20 169/97 Abnormal  100 % -- -- -- SC   06/15/24 0050 --  22 162/124 Abnormal  100 % -- -- -- SC

## 2024-06-17 NOTE — PLAN OF CARE
Assumed care at change of shift at 1930.    Patient alert and orientated x 4. Maintaining oxygen saturations on room air. Active bowel sounds. Continent of bowel/bladder. Oliguric due to HD. No complaints of loose stools since admission. Contact isolation discontinued. Normal sinus with first degree heart block on tele monitor. Patient endorsed nausea and abdominal pain- PRN Compazine given per order. Patient updated with plan of care. Bed locked in lowest position with bed alarm on. Call light and personal belongings within reach.    2300: HD complete. Offered patient scheduled medications- Hydralazine and Nifedipine. Patient refused stating his blood pressure was too low. Blood pressure 110/70. Education provided.    0000: Patient's blood pressure 161/96. Re-offered patient's missed medications. Patient complied.     0043: Blood pressure rechecked 126/85.    Problem: Patient/Family Goals  Goal: Patient/Family Long Term Goal  Description: Patient's Long Term Goal: Stay out of the hospital    Interventions:  - Attend follow up appointments  - Follow HD schedule outpatient  - Take medications as ordered  - See additional Care Plan goals for specific interventions  Outcome: Progressing  Goal: Patient/Family Short Term Goal  Description: Patient's Short Term Goal: Pain control, breathe better    Interventions:   - Labs, tele, conults  - PRN pain medicine  - Resume home medications  - See additional Care Plan goals for specific interventions  Outcome: Progressing

## 2024-06-17 NOTE — PAYOR COMM NOTE
--------------  CONTINUED STAY REVIEW    Payor: [employer]  Subscriber #:  [id number]  Authorization Number: [id number]    Admit date: 6/14/24  Admit time:  9:31 PM    REVIEW DOCUMENTATION:   6-16-24     Component  Ref Range & Units 6/16/24 0554   Glucose  70 - 99 mg/dL 126 High    Sodium  136 - 145 mmol/L 138   Potassium  3.5 - 5.1 mmol/L 3.5   Chloride  98 - 112 mmol/L 105   CO2  21.0 - 32.0 mmol/L 25.0   Anion Gap  0 - 18 mmol/L 8   BUN  9 - 23 mg/dL 49 High    Creatinine  0.70 - 1.30 mg/dL 6.87 High    Calcium, Total  8.5 - 10.1 mg/dL 8.2 Low    Calculated Osmolality  275 - 295 mOsm/kg 301 High    eGFR-Cr  >=60 mL/min/1.73m2 9 Low    Albumin  3.4 - 5.0 g/dL 3.3 Low    Phosphorus  2.5 - 4.9 mg/dL 4.4     Physical Exam:    General: No acute distress.   HEENT:  EOMI, PERRLA, OP clear  Respiratory: Clear to auscultation bilaterally. No wheezes. No rhonchi.  Cardiovascular: S1, S2. Regular rate and rhythm. No murmurs.  Abdomen: Soft, nontender, nondistended.  Positive bowel sounds. No rebound or guarding.  Extremities: No edema.  Neuro:  Grossly non focal, no motor deficits.      Assessment & Plan:  Godwin Fonseca is a 44 year old male with PMH significant for ESRD on HD, bipolar, depression, DM 2, HTN, DL, CAD, COPD, HFpEF, chronic abdominal pain presents to the ED w/ missed HD sessions and pain.         Missed HD sessions  ESRD on HD  -HD T/TH/Sa  -nephrology consulted  -HD yest and again today     Recent Pneumonia  -completed abx  -on O2 here >> will attempt to wean off, unclear if pt was hypoxic  -cxr shows improved r sided infiltrate  -procal slightly elevated,  but HD pt, labs normal, lungs CTAB, pt not coughing  -hold off on abx for now  -lidocaine patch to right rib cage    Chronic pain  Likely drug use dependance  -Follows with pain clinic outpatient  -Resume home pain medication.  -iv pain meds stopped    Anemia  -chronic - hgb 10.3 this am  -hx of GI bleed     HTN  DL  CAD  HFpEF  -resume  home meds     DM2  -hold po meds  -ISS +accuchecks     COPD  -no evidence of acute exacerbation  -resume home inhalers     MDD  Bipolar   -resume home meds               MEDICATIONS ADMINISTERED IN LAST 1 DAY:  amphetamine-dextroamphetamine (Adderall) tab 20 mg       Date Action Dose Route User    6/17/2024 0820 Given 20 mg Oral Susan Vega RN          ARIPiprazole (Abilify) tab 5 mg       Date Action Dose Route User    6/17/2024 0821 Given 5 mg Oral Susan Vega RN          bisacodyl (Dulcolax) DR tab 15 mg       Date Action Dose Route User    6/17/2024 0821 Given 15 mg Oral Susan Vega RN          buPROPion ER (Wellbutrin XL) 24 hr tab 150 mg       Date Action Dose Route User    6/17/2024 0821 Given 150 mg Oral Susan Vega RN          carvedilol (Coreg) tab 25 mg       Date Action Dose Route User    6/17/2024 0821 Given 25 mg Oral Susan Vega RN    6/16/2024 2302 Given 25 mg Oral Tequila Park RN          cloNIDine (Catapres) tab 0.1 mg       Date Action Dose Route User    6/17/2024 0820 Given 0.1 mg Oral Susan Vega RN          fenofibrate micronized (Lofibra) cap 134 mg       Date Action Dose Route User    6/16/2024 2302 Given 134 mg Oral Tequila Park RN          FLUoxetine (PROzac) cap 40 mg       Date Action Dose Route User    6/17/2024 0821 Given 40 mg Oral Susan Vega RN          heparin (Porcine) 1000 UNIT/ML injection 3,600 Units       Date Action Dose Route User    6/16/2024 2217 Given 3,600 Units Intravenous (Central Line) Tequila Park RN          heparin (Porcine) 5000 UNIT/ML injection 5,000 Units       Date Action Dose Route User    6/17/2024 0532 Given 5,000 Units Subcutaneous (Right Lower Abdomen) Tequila Park RN    6/16/2024 2302 Given 5,000 Units Subcutaneous (Left Lower Abdomen) Tequila Park RN    6/16/2024 1312 Given 5,000 Units Subcutaneous (Right Lower Abdomen) Strezo, Debra, RN          hydrALAZINE (Apresoline) tab 50  mg       Date Action Dose Route User    6/17/2024 0821 Given 50 mg Oral Susan Vega RN    6/17/2024 0001 Given 50 mg Oral Tequila Park RN          HYDROcodone-acetaminophen (Norco) 5-325 MG per tab 2 tablet       Date Action Dose Route User    6/17/2024 0653 Given 2 tablet Oral Tequila Park RN          isosorbide mononitrate ER (Imdur) 24 hr tab 30 mg       Date Action Dose Route User    6/17/2024 0821 Given 30 mg Oral Susan Vega RN          lamoTRIgine (LaMICtal) tab 150 mg       Date Action Dose Route User    6/17/2024 0820 Given 150 mg Oral Susan Vega RN          lidocaine-menthol 4-1 % patch 1 patch       Date Action Dose Route User    6/17/2024 0820 Patch Applied 1 patch Transdermal (Left Lower Back) Susan Vega RN          losartan (Cozaar) tab 100 mg       Date Action Dose Route User    6/17/2024 0820 Given 100 mg Oral Susan Vega RN          NIFEdipine ER (Procardia-XL) 24 hr tab 60 mg       Date Action Dose Route User    6/17/2024 0821 Given 60 mg Oral Susan Vega RN    6/17/2024 0002 Given 60 mg Oral Tequila Park RN          polyethylene glycol (PEG 3350) (Miralax) 17 g oral packet 17 g       Date Action Dose Route User    6/17/2024 0820 Given 17 g Oral Susan Vega RN          prochlorperazine (Compazine) 10 MG/2ML injection 10 mg       Date Action Dose Route User    6/16/2024 2252 Given 10 mg Intravenous Tequila Park RN          sevelamer carbonate (Renvela) tab 800 mg       Date Action Dose Route User    6/17/2024 0820 Given 800 mg Oral Ssuan Vega RN    6/16/2024 2302 Given 800 mg Oral Tequila Park RN    6/16/2024 1311 Given 800 mg Oral Debra Hutchins RN            Vitals (last day)       Date/Time Temp Pulse Resp BP SpO2 Weight O2 Device O2 Flow Rate (L/min) [facility]    06/17/24 0345 98.5 °F (36.9 °C) -- 20 -- -- -- -- -- BR    06/17/24 0043 -- 85 -- 126/85 94 % -- None (Room air) -- PB    06/17/24 0000 -- 79 -- 161/96  87 % -- -- -- PB    06/16/24 1800 -- 87 -- 115/91 95 % -- -- -- PB    06/16/24 1644 98.1 °F (36.7 °C) 82 16 118/74 92 % -- None (Room air) -- MS    06/16/24 1237 97.6 °F (36.4 °C) 87 16 112/65 91 % 246 lb 7.6 oz None (Room air) -- LS    06/16/24 0733 98.2 °F (36.8 °C) 89 20 137/74 95 % -- None (Room air) -- LS    06/16/24 0415 98 °F (36.7 °C) 83 18 149/92 99 % -- Nasal cannula 1 L/min SS    06/16/24 0100 -- 77 -- -- 96 % -- Nasal cannula 1 L/min PB    06/16/24 0035 98.3 °F (36.8 °C) 80 18 142/82 96 % -- Nasal cannula 3 L/min SS

## 2024-06-17 NOTE — PROGRESS NOTES
[facility]   part of [facility]    Nephrology Progress Note    Godwin Fonseca Attending:  Ria Gutierrez MD       SUBJECTIVE:     Feels better, wants to go home  Sp HD yesterday w UF 1.5L    PHYSICAL EXAM:     Vital Signs: /73 (BP Location: Right arm)   Pulse 90   Temp 98.2 °F (36.8 °C) (Oral)   Resp 16   Ht 6' 2\" (1.88 m)   Wt 246 lb 7.6 oz (111.8 kg)   SpO2 94%   BMI 31.65 kg/m²   Temp (24hrs), Av.2 °F (36.8 °C), Min:97.9 °F (36.6 °C), Max:98.5 °F (36.9 °C)       Intake/Output Summary (Last 24 hours) at 2024 1743  Last data filed at 2024 1200  Gross per 24 hour   Intake 1104 ml   Output 2100 ml   Net -996 ml     Wt Readings from Last 3 Encounters:   24 246 lb 7.6 oz (111.8 kg)   24 238 lb (108 kg)   24 238 lb (108 kg)       General: pleasant, well appearing  Skin: no visible rashes  HEENT: NCAT  Cardiac: Regular rate and rhythm, no murmur/gallop or rub  Lungs: ctab  Abdomen: Soft, NTND  Extremities: warm, well perfused, no leg edema  Neurologic/Psych: mentating well,  RIJ CVC  LUE AVF + thrill and bruit       LABORATORY DATA:     Lab Results   Component Value Date     (H) 2024    BUN 28 (H) 2024    BUNCREA 13.0 2022    CREATSERUM 4.75 (H) 2024    ANIONGAP 7 2024    GFR 59 (L) 2018    GFRNAA 30 (L) 2022    GFRAA 35 (L) 2022    CA 8.2 (L) 2024    OSMOCALC 295 2024    ALKPHO 86 2024    AST 50 (H) 2024    ALT 38 2024    BILT 0.3 2024    TP 6.9 2024    ALB 3.1 (L) 2024    GLOBULIN 3.9 2024     2024    K 3.5 2024     2024    CO2 28.0 2024     Lab Results   Component Value Date    WBC 5.9 06/15/2024    RBC 3.18 (L) 06/15/2024    HGB 10.3 (L) 06/15/2024    HCT 29.0 (L) 06/15/2024    .0 06/15/2024    MPV 11.5 2012    MCV 91.2 06/15/2024    MCH 32.4 06/15/2024    MCHC 35.5 06/15/2024    RDW 13.2 06/15/2024     NEPRELIM 3.70 06/15/2024    NEPERCENT 62.6 06/15/2024    LYPERCENT 18.4 06/15/2024    MOPERCENT 13.2 06/15/2024    EOPERCENT 4.1 06/15/2024    BAPERCENT 1.5 06/15/2024    NE 3.70 06/15/2024    LYMABS 1.09 06/15/2024    MOABSO 0.78 06/15/2024    EOABSO 0.24 06/15/2024    BAABSO 0.09 06/15/2024     Lab Results   Component Value Date    MALBP 167.00 05/24/2023    CREUR 142.00 05/24/2023    CREUR 142.00 05/24/2023     Lab Results   Component Value Date    COLORUR Yellow 01/03/2024    CLARITY Clear 01/03/2024    SPECGRAVITY 1.025 01/03/2024    GLUUR Negative 01/03/2024    BILUR Negative 01/03/2024    KETUR Trace (A) 01/03/2024    BLOODURINE Negative 01/03/2024    PHURINE 5.5 01/03/2024    PROUR >=300 (A) 01/03/2024    UROBILINOGEN 0.2 01/03/2024    NITRITE Negative 01/03/2024    LEUUR Negative 01/03/2024    WBCUR 6-10 (A) 01/03/2024    RBCUR 0-2 01/03/2024    EPIUR Few (A) 01/03/2024    BACUR Rare (A) 01/03/2024    CAOXUR Occasional (A) 04/20/2018    HYLUR Present (A) 05/14/2019         IMAGING:     Reviewed.    MEDICATIONS:       Current Facility-Administered Medications   Medication Dose Route Frequency    albuterol (Ventolin) (2.5 MG/3ML) 0.083% nebulizer solution 2.5 mg  2.5 mg Nebulization Q6H PRN    sodium chloride 0.9 % IV bolus 100 mL  100 mL Intravenous Q30 Min PRN    And    albumin human (Albumin) 25% injection 25 g  25 g Intravenous PRN Dialysis    heparin (Porcine) 1000 UNIT/ML injection 1,500 Units  1.5 mL Intracatheter Once    amphetamine-dextroamphetamine (Adderall) tab 20 mg  20 mg Oral Daily    prochlorperazine (Compazine) 10 MG/2ML injection 10 mg  10 mg Intravenous Q6H PRN    heparin (Porcine) 1000 UNIT/ML injection 3,600 Units  3,600 Units Intravenous PRN Dialysis    HYDROcodone-acetaminophen (Norco) 5-325 MG per tab 1 tablet  1 tablet Oral Q4H PRN    Or    HYDROcodone-acetaminophen (Norco) 5-325 MG per tab 2 tablet  2 tablet Oral Q4H PRN    bisacodyl (Dulcolax) DR tab 15 mg  15 mg Oral Daily     docusate sodium (Colace) cap 100 mg  100 mg Oral BID PRN    fenofibrate micronized (Lofibra) cap 134 mg  134 mg Oral Nightly    fluticasone propionate (Flonase) 50 MCG/ACT nasal suspension 1 spray  1 spray Each Nare Daily    polyethylene glycol (PEG 3350) (Miralax) 17 g oral packet 17 g  17 g Oral BID    lidocaine-menthol 4-1 % patch 1 patch  1 patch Transdermal Daily    heparin (Porcine) 5000 UNIT/ML injection 5,000 Units  5,000 Units Subcutaneous Q8H MARTHA    acetaminophen (Tylenol Extra Strength) tab 500 mg  500 mg Oral Q4H PRN    ondansetron (Zofran) 4 MG/2ML injection 4 mg  4 mg Intravenous Q6H PRN    polyethylene glycol (PEG 3350) (Miralax) 17 g oral packet 17 g  17 g Oral Daily PRN    sennosides (Senokot) tab 17.2 mg  17.2 mg Oral Nightly PRN    bisacodyl (Dulcolax) 10 MG rectal suppository 10 mg  10 mg Rectal Daily PRN    melatonin tab 3 mg  3 mg Oral Nightly PRN    ARIPiprazole (Abilify) tab 5 mg  5 mg Oral Daily    buPROPion ER (Wellbutrin XL) 24 hr tab 150 mg  150 mg Oral Daily    carvedilol (Coreg) tab 25 mg  25 mg Oral BID with meals    cloNIDine (Catapres) tab 0.1 mg  0.1 mg Oral Daily with food    dicyclomine (Bentyl) cap 10 mg  10 mg Oral TID PRN    FLUoxetine (PROzac) cap 40 mg  40 mg Oral Daily    hydrALAZINE (Apresoline) tab 50 mg  50 mg Oral BID    isosorbide mononitrate ER (Imdur) 24 hr tab 30 mg  30 mg Oral Daily    lamoTRIgine (LaMICtal) tab 150 mg  150 mg Oral Daily    losartan (Cozaar) tab 100 mg  100 mg Oral Daily    NIFEdipine ER (Procardia-XL) 24 hr tab 60 mg  60 mg Oral BID    sevelamer carbonate (Renvela) tab 800 mg  800 mg Oral TID CC       ASSESSMENT/PLAN:     45 yo M with history of ESRD TTS via RIJ tunnelled CVC ([facility]-Dr. Singletary), LUE AVF placement last week, hyperaldosteronism with history of hypertensive urgency, DM, bipolar disorder, asthma, chronic abd pain presented with shortness of breath in the setting of missed HD treatment and recent pna. Also presenting with  diarrhea.     ESRD:  -- s/p HD Friday  -- HD Sunday w UF 1/5L  -- now resume TTS schedule      LUE AVF:  -- L arm precautions  -- not ready to use     Anemia:  -- defer OWEN until BP more stable and Hb < 10     Ssecondary hyperparathyroidism, hypocalcemia:  -- started calcitriol 0.25 every other day, pt notes gets at HD unit     HTN:  -- resumed home meds     Diarrhea:  -- work up per primary     Recent pna, lower chest pain:  --defer further imaging to primary    Ok to discharge from nephrology   D/w RN    Thank you for allowing me to participate in the care of this patient. Please do not hesitate to call with questions or concerns.    Samaria Nava MD  [facility] Neph[facility]  [address] [place] [id number]

## 2024-06-17 NOTE — PLAN OF CARE
Received patient at 0730. Alert and Oriented x4. Tele Rhythm NSR. Pt on RA. Bed is locked and in low position. Call light and personal items within reach. Pt has minial urine output, HD patient. Pt ambulates with SBA. R internal jugular cath. L arm Av fistula. Reviewed plan of care and patient verbalizes understanding.     Plan:  Awaiting mds    Problem: Patient/Family Goals  Goal: Patient/Family Long Term Goal  Description: Patient's Long Term Goal: Stay out of the hospital    Interventions:  - Attend follow up appointments  - Follow HD schedule outpatient  - Take medications as ordered  - See additional Care Plan goals for specific interventions  Outcome: Progressing  Goal: Patient/Family Short Term Goal  Description: Patient's Short Term Goal: Pain control, breathe better    Interventions:   - Labs, tele, conults  - PRN pain medicine  - Resume home medications  - See additional Care Plan goals for specific interventions  Outcome: Progressing     Problem: CARDIOVASCULAR - ADULT  Goal: Maintains optimal cardiac output and hemodynamic stability  Description: INTERVENTIONS:  - Monitor vital signs, rhythm, and trends  - Monitor for bleeding, hypotension and signs of decreased cardiac output  - Evaluate effectiveness of vasoactive medications to optimize hemodynamic stability  - Monitor arterial and/or venous puncture sites for bleeding and/or hematoma  - Assess quality of pulses, skin color and temperature  - Assess for signs of decreased coronary artery perfusion - ex. Angina  - Evaluate fluid balance, assess for edema, trend weights  Outcome: Progressing  Goal: Absence of cardiac arrhythmias or at baseline  Description: INTERVENTIONS:  - Continuous cardiac monitoring, monitor vital signs, obtain 12 lead EKG if indicated  - Evaluate effectiveness of antiarrhythmic and heart rate control medications as ordered  - Initiate emergency measures for life threatening arrhythmias  - Monitor electrolytes and administer  replacement therapy as ordered  Outcome: Progressing     Problem: RESPIRATORY - ADULT  Goal: Achieves optimal ventilation and oxygenation  Description: INTERVENTIONS:  - Assess for changes in respiratory status  - Assess for changes in mentation and behavior  - Position to facilitate oxygenation and minimize respiratory effort  - Oxygen supplementation based on oxygen saturation or ABGs  - Provide Smoking Cessation handout, if applicable  - Encourage broncho-pulmonary hygiene including cough, deep breathe, Incentive Spirometry  - Assess the need for suctioning and perform as needed  - Assess and instruct to report SOB or any respiratory difficulty  - Respiratory Therapy support as indicated  - Manage/alleviate anxiety  - Monitor for signs/symptoms of CO2 retention  Outcome: Progressing     Problem: GASTROINTESTINAL - ADULT  Goal: Minimal or absence of nausea and vomiting  Description: INTERVENTIONS:  - Maintain adequate hydration with IV or PO as ordered and tolerated  - Nasogastric tube to low intermittent suction as ordered  - Evaluate effectiveness of ordered antiemetic medications  - Provide nonpharmacologic comfort measures as appropriate  - Advance diet as tolerated, if ordered  - Obtain nutritional consult as needed  - Evaluate fluid balance  Outcome: Progressing  Goal: Maintains or returns to baseline bowel function  Description: INTERVENTIONS:  - Assess bowel function  - Maintain adequate hydration with IV or PO as ordered and tolerated  - Evaluate effectiveness of GI medications  - Encourage mobilization and activity  - Obtain nutritional consult as needed  - Establish a toileting routine/schedule  - Consider collaborating with pharmacy to review patient's medication profile  Outcome: Progressing  Goal: Maintains adequate nutritional intake (undernourished)  Description: INTERVENTIONS:  - Monitor percentage of each meal consumed  - Identify factors contributing to decreased intake, treat as appropriate  -  Assist with meals as needed  - Monitor I&O, WT and lab values  - Obtain nutritional consult as needed  - Optimize oral hygiene and moisture  - Encourage food from home; allow for food preferences  - Enhance eating environment  Outcome: Progressing  Goal: Achieves appropriate nutritional intake (bariatric)  Description: INTERVENTIONS:  - Monitor for over-consumption  - Identify factors contributing to increased intake, treat as appropriate  - Monitor I&O, WT and lab values  - Obtain nutritional consult as needed  - Evaluate psychosocial factors contributing to over-consumption  Outcome: Progressing     Problem: GENITOURINARY - ADULT  Goal: Absence of urinary retention  Description: INTERVENTIONS:  - Assess patient’s ability to void and empty bladder  - Monitor intake/output and perform bladder scan as needed  - Follow urinary retention protocol/standard of care  - Consider collaborating with pharmacy to review patient's medication profile  - Implement strategies to promote bladder emptying  Outcome: Progressing     Problem: METABOLIC/FLUID AND ELECTROLYTES - ADULT  Goal: Glucose maintained within prescribed range  Description: INTERVENTIONS:  - Monitor Blood Glucose as ordered  - Assess for signs and symptoms of hyperglycemia and hypoglycemia  - Administer ordered medications to maintain glucose within target range  - Assess barriers to adequate nutritional intake and initiate nutrition consult as needed  - Instruct patient on self management of diabetes  Outcome: Progressing  Goal: Electrolytes maintained within normal limits  Description: INTERVENTIONS:  - Monitor labs and rhythm and assess patient for signs and symptoms of electrolyte imbalances  - Administer electrolyte replacement as ordered  - Monitor response to electrolyte replacements, including rhythm and repeat lab results as appropriate  - Fluid restriction as ordered  - Instruct patient on fluid and nutrition restrictions as appropriate  Outcome:  Progressing  Goal: Hemodynamic stability and optimal renal function maintained  Description: INTERVENTIONS:  - Monitor labs and assess for signs and symptoms of volume excess or deficit  - Monitor intake, output and patient weight  - Monitor urine specific gravity, serum osmolarity and serum sodium as indicated or ordered  - Monitor response to interventions for patient's volume status, including labs, urine output, blood pressure (other measures as available)  - Encourage oral intake as appropriate  - Instruct patient on fluid and nutrition restrictions as appropriate  Outcome: Progressing     Problem: HEMATOLOGIC - ADULT  Goal: Maintains hematologic stability  Description: INTERVENTIONS  - Assess for signs and symptoms of bleeding or hemorrhage  - Monitor labs and vital signs for trends  - Administer supportive blood products/factors, fluids and medications as ordered and appropriate  - Administer supportive blood products/factors as ordered and appropriate  Outcome: Progressing     Problem: MUSCULOSKELETAL - ADULT  Goal: Return mobility to safest level of function  Description: INTERVENTIONS:  - Assess patient stability and activity tolerance for standing, transferring and ambulating w/ or w/o assistive devices  - Assist with transfers and ambulation using safe patient handling equipment as needed  - Ensure adequate protection for wounds/incisions during mobilization  - Obtain PT/OT consults as needed  - Advance activity as appropriate  - Communicate ordered activity level and limitations with patient/family  Outcome: Progressing     Problem: Impaired Functional Mobility  Goal: Achieve highest/safest level of mobility/gait  Description: Interventions:  - Assess patient's functional ability and stability  - Promote increasing activity/tolerance for mobility and gait  - Educate and engage patient/family in tolerated activity level and precautions  - Recommend use of sit-stand lift for transfers  Outcome:  Progressing

## 2024-06-17 NOTE — PROGRESS NOTES
[facility]   part of [facility] Hospitalist Progress Note     Godwin Fonseca Patient Status:  Inpatient    1980 MRN [id number]   Location [facility] 8NE-A Attending Ria Gutierrez MD   Hosp Day # 3 PCP Prieto Novak MD     Subjective:     Patient seen and examined.   Feels better  Less pain  Sleepy    Objective:    Review of Systems:   10 point ROS completed and was negative, except for pertinent positive and negatives stated in subjective.    Vital signs:  Temp:  [97.6 °F (36.4 °C)-98.5 °F (36.9 °C)] 98.2 °F (36.8 °C)  Pulse:  [79-90] 81  Resp:  [16-20] 20  BP: (110-161)/() 122/72  SpO2:  [87 %-96 %] 92 %    Physical Exam:    General: No acute distress.   HEENT:  EOMI, PERRLA, OP clear  Respiratory: Clear to auscultation bilaterally. No wheezes. No rhonchi.  Cardiovascular: S1, S2. Regular rate and rhythm. No murmurs.  Abdomen: Soft, nontender, nondistended.  Positive bowel sounds. No rebound or guarding.  Extremities: No edema.  Neuro:  Grossly non focal, no motor deficits.        Diagnostic Data:    Labs:  Recent Labs   Lab 06/14/24  1659 06/15/24  0417   WBC 7.8 5.9   HGB 8.8* 10.3*   MCV 96.7 91.2   .0 273.0       Recent Labs   Lab 24  1659 06/15/24  0918 24  0554 24  0428   * 115* 126* 158*   BUN 95* 39* 49* 28*   CREATSERUM 9.60* 5.74* 6.87* 4.75*   CA 7.3* 8.4* 8.2* 8.2*   ALB 3.0* 3.4 3.3* 3.1*    141 138 138   K 4.4 3.3* 3.5 3.5    105 105 103   CO2 15.0* 28.0 25.0 28.0   ALKPHO 86  --   --   --    AST 50*  --   --   --    ALT 38  --   --   --    BILT 0.3  --   --   --    TP 6.9  --   --   --        Estimated Creatinine Clearance: 22.6 mL/min (A) (based on SCr of 4.75 mg/dL (H)).    No results for input(s): \"PTP\", \"INR\" in the last 168 hours.         COVID-19 Lab Results    COVID-19  Lab Results   Component Value Date    COVID19 Not Detected 05/15/2024    COVID19 Not Detected 2024    COVID19 Not  Detected 03/09/2024       Pro-Calcitonin  Recent Labs   Lab 06/15/24  0918   PCT 0.50*       Cardiac  Recent Labs   Lab 06/14/24  1659   PBNP 23,236*       Creatinine Kinase  No results for input(s): \"CK\" in the last 168 hours.    Inflammatory Markers  No results for input(s): \"CRP\", \"HARJEET\", \"LDH\", \"DDIMER\" in the last 168 hours.    Imaging: Imaging data reviewed in Epic.    Medications:    heparin  1.5 mL Intracatheter Once    amphetamine-dextroamphetamine  20 mg Oral Daily    bisacodyl  15 mg Oral Daily    fenofibrate micronized  134 mg Oral Nightly    fluticasone propionate  1 spray Each Nare Daily    polyethylene glycol (PEG 3350)  17 g Oral BID    lidocaine-menthol  1 patch Transdermal Daily    heparin  5,000 Units Subcutaneous Q8H MARTHA    ARIPiprazole  5 mg Oral Daily    buPROPion ER  150 mg Oral Daily    carvedilol  25 mg Oral BID with meals    cloNIDine  0.1 mg Oral Daily with food    FLUoxetine HCl  40 mg Oral Daily    hydrALAZINE  50 mg Oral BID    isosorbide mononitrate ER  30 mg Oral Daily    lamoTRIgine  150 mg Oral Daily    losartan  100 mg Oral Daily    NIFEdipine ER  60 mg Oral BID    sevelamer carbonate  800 mg Oral TID CC       Assessment & Plan:    Godwin Fonseca is a 44 year old male with PMH significant for ESRD on HD, bipolar, depression, DM 2, HTN, DL, CAD, COPD, HFpEF, chronic abdominal pain presents to the ED w/ missed HD sessions and pain.         Missed HD sessions  ESRD on HD  -HD T/TH/Sa  -nephrology consulted  -had 2 back to back HD sessions >> HD per reg schedule now     Recent Pneumonia  -completed abx  -on O2 here >> will attempt to wean off, unclear if pt was hypoxic  -cxr shows improved r sided infiltrate  -procal slightly elevated,  but HD pt, labs normal, lungs CTAB, pt not coughing  -hold off on abx for now  -lidocaine patch to right rib cage    Chronic pain  Likely drug use dependance  -Follows with pain clinic outpatient  -Resume home pain medication.  -iv pain meds  stopped    Anemia  -chronic - hgb 10.3 this am  -hx of GI bleed     HTN  DL  CAD  HFpEF  -resume home meds     DM2  -hold po meds  -ISS +accuchecks     COPD  -no evidence of acute exacerbation  -resume home inhalers     MDD  Bipolar   -resume home meds           Quality:  DVT Prophylaxis: heparin  CODE status: full code  Abbott: no  If COVID testing is negative, may discontinue isolation: yes      DISPO:  Dc planning in process  Plan for dc today if cleared bynephmehran Pierce with pcp as outpt    Plan of care discussed with pt and rn and all questions answered.           Ria Gutierrez MD  [facility]  Pager [phone]  Answering Service number: [phone]

## 2024-06-17 NOTE — CDS QUERY
CLINICAL DOCUMENTATION CLARIFICATION FORM  Dear Dr. Gutierrez:   Please further clarify the documented diagnosis of Diastolic Congestive Heart Failure (CHF):   ( X  ) Chronic Diastolic CHF                             (   )  Acute on Chronic Diastolic CHF   (  ) Other (please specify):     CLINICAL INFORMATION FROM THE MEDICAL RECORD  Clinical Indicators/potential risk:   ___44M to ER with LIZBETH, recently completed treatment for PNA   ___proBNP 44975, CXR Right-sided pleural effusion and right basilar consolidation is mildly decreased.  ___ER Provider Note: I reviewed radiology report they had compared it to previous and actually stated that he has an  improvement of his pleural effusion. . Clinical Impression: Acute on Chronic Heart Failure.   ___H&P: HfpEF   ___11/2023 Historical Echo Systolic function was normal. The estimated ejection fraction    was 55-60%. No diagnostic evidence for regional wall motion abnormalities. Left ventricular diastolic function parameters were normal.  Treatment: CXR, PTA Coreg, Cozaar continued on admission. HD, 40mg IV Lasix 6/14          Use of terms such as suspected, possible, or probable (associated with a specific diagnosis that is being evaluated, monitored, or treated as if it exists) are acceptable and can be coded in the inpatient setting, when documented at the time of discharge.     Please add any additional documentation to your progress note and continue to document this through discharge.    Thank you. For questions regarding this query, please contact Clinical : Karey Milian [phone]  This document is a permanent part of the medical record

## 2024-06-23 ENCOUNTER — APPOINTMENT (OUTPATIENT)
Dept: CT IMAGING | Age: 44
End: 2024-06-23
Attending: EMERGENCY MEDICINE

## 2024-06-23 ENCOUNTER — HOSPITAL ENCOUNTER (EMERGENCY)
Age: 44
Discharge: HOME | End: 2024-06-23
Attending: EMERGENCY MEDICINE

## 2024-06-23 VITALS
RESPIRATION RATE: 20 BRPM | SYSTOLIC BLOOD PRESSURE: 189 MMHG | WEIGHT: 240 LBS | DIASTOLIC BLOOD PRESSURE: 129 MMHG | OXYGEN SATURATION: 98 % | TEMPERATURE: 99 F | BODY MASS INDEX: 30.8 KG/M2 | HEIGHT: 74 IN | HEART RATE: 99 BPM

## 2024-06-23 DIAGNOSIS — R10.9 CHRONIC ABDOMINAL PAIN: Primary | ICD-10-CM

## 2024-06-23 DIAGNOSIS — G89.29 CHRONIC ABDOMINAL PAIN: Primary | ICD-10-CM

## 2024-06-23 LAB
ALBUMIN SERPL-MCNC: 3.7 G/DL (ref 3.4–5)
ALBUMIN/GLOB SERPL: 0.9 {RATIO} (ref 1–2)
ALP LIVER SERPL-CCNC: 91 U/L
ALT SERPL-CCNC: 28 U/L
ANION GAP SERPL CALC-SCNC: 11 MMOL/L (ref 0–18)
AST SERPL-CCNC: 38 U/L (ref 15–37)
BASOPHILS # BLD AUTO: 0.1 X10(3) UL (ref 0–0.2)
BASOPHILS NFR BLD AUTO: 1.2 %
BILIRUB SERPL-MCNC: 0.4 MG/DL (ref 0.1–2)
BUN BLD-MCNC: 61 MG/DL (ref 9–23)
CALCIUM BLD-MCNC: 7.7 MG/DL (ref 8.5–10.1)
CHLORIDE SERPL-SCNC: 97 MMOL/L (ref 98–112)
CO2 SERPL-SCNC: 29 MMOL/L (ref 21–32)
CREAT BLD-MCNC: 8.82 MG/DL
EGFRCR SERPLBLD CKD-EPI 2021: 7 ML/MIN/1.73M2 (ref 60–?)
EOSINOPHIL # BLD AUTO: 0.1 X10(3) UL (ref 0–0.7)
EOSINOPHIL NFR BLD AUTO: 1.2 %
ERYTHROCYTE [DISTWIDTH] IN BLOOD BY AUTOMATED COUNT: 12.9 %
GLOBULIN PLAS-MCNC: 3.9 G/DL (ref 2.8–4.4)
GLUCOSE BLD-MCNC: 100 MG/DL (ref 70–99)
HCT VFR BLD AUTO: 28.5 %
HGB BLD-MCNC: 9.8 G/DL
IMM GRANULOCYTES # BLD AUTO: 0.02 X10(3) UL (ref 0–1)
IMM GRANULOCYTES NFR BLD: 0.2 %
LIPASE SERPL-CCNC: 88 U/L (ref 13–75)
LYMPHOCYTES # BLD AUTO: 1.66 X10(3) UL (ref 1–4)
LYMPHOCYTES NFR BLD AUTO: 20.4 %
MCH RBC QN AUTO: 31.6 PG (ref 26–34)
MCHC RBC AUTO-ENTMCNC: 34.4 G/DL (ref 31–37)
MCV RBC AUTO: 91.9 FL
MONOCYTES # BLD AUTO: 1.16 X10(3) UL (ref 0.1–1)
MONOCYTES NFR BLD AUTO: 14.3 %
NEUTROPHILS # BLD AUTO: 5.1 X10 (3) UL (ref 1.5–7.7)
NEUTROPHILS # BLD AUTO: 5.1 X10(3) UL (ref 1.5–7.7)
NEUTROPHILS NFR BLD AUTO: 62.7 %
OSMOLALITY SERPL CALC.SUM OF ELEC: 301 MOSM/KG (ref 275–295)
PLATELET # BLD AUTO: 262 10(3)UL (ref 150–450)
POTASSIUM SERPL-SCNC: 3.4 MMOL/L (ref 3.5–5.1)
PROT SERPL-MCNC: 7.6 G/DL (ref 6.4–8.2)
RBC # BLD AUTO: 3.1 X10(6)UL
SODIUM SERPL-SCNC: 137 MMOL/L (ref 136–145)
WBC # BLD AUTO: 8.1 X10(3) UL (ref 4–11)

## 2024-06-23 PROCEDURE — 96361 HYDRATE IV INFUSION ADD-ON: CPT

## 2024-06-23 PROCEDURE — 80053 COMPREHEN METABOLIC PANEL: CPT | Performed by: EMERGENCY MEDICINE

## 2024-06-23 PROCEDURE — 74176 CT ABD & PELVIS W/O CONTRAST: CPT | Performed by: EMERGENCY MEDICINE

## 2024-06-23 PROCEDURE — 99285 EMERGENCY DEPT VISIT HI MDM: CPT

## 2024-06-23 PROCEDURE — 96375 TX/PRO/DX INJ NEW DRUG ADDON: CPT

## 2024-06-23 PROCEDURE — 83690 ASSAY OF LIPASE: CPT | Performed by: EMERGENCY MEDICINE

## 2024-06-23 PROCEDURE — 99284 EMERGENCY DEPT VISIT MOD MDM: CPT

## 2024-06-23 PROCEDURE — 96376 TX/PRO/DX INJ SAME DRUG ADON: CPT

## 2024-06-23 PROCEDURE — 96374 THER/PROPH/DIAG INJ IV PUSH: CPT

## 2024-06-23 PROCEDURE — 85025 COMPLETE CBC W/AUTO DIFF WBC: CPT | Performed by: EMERGENCY MEDICINE

## 2024-06-23 RX ORDER — HALOPERIDOL 5 MG/ML
5 INJECTION INTRAMUSCULAR ONCE
Status: DISCONTINUED | OUTPATIENT
Start: 2024-06-23 | End: 2024-06-23

## 2024-06-23 RX ORDER — HYDROMORPHONE HYDROCHLORIDE 1 MG/ML
INJECTION, SOLUTION INTRAMUSCULAR; INTRAVENOUS; SUBCUTANEOUS
Status: COMPLETED
Start: 2024-06-23 | End: 2024-06-23

## 2024-06-23 RX ORDER — HYDROMORPHONE HYDROCHLORIDE 1 MG/ML
0.5 INJECTION, SOLUTION INTRAMUSCULAR; INTRAVENOUS; SUBCUTANEOUS ONCE
Status: COMPLETED | OUTPATIENT
Start: 2024-06-23 | End: 2024-06-23

## 2024-06-23 RX ORDER — SODIUM CHLORIDE 9 MG/ML
125 INJECTION, SOLUTION INTRAVENOUS CONTINUOUS
Status: DISCONTINUED | OUTPATIENT
Start: 2024-06-23 | End: 2024-06-23

## 2024-06-23 RX ORDER — ONDANSETRON 2 MG/ML
4 INJECTION INTRAMUSCULAR; INTRAVENOUS ONCE
Status: COMPLETED | OUTPATIENT
Start: 2024-06-23 | End: 2024-06-23

## 2024-06-23 NOTE — ED PROVIDER NOTES
Patient Seen in: [facility] In [place]      History     Chief Complaint   Patient presents with    Abdominal Pain     Stated Complaint: abd pain for over a year and groin pain - started 2 days    Subjective:   HPI    44-year-old male with history of end-stage renal disease on hemodialysis, coronary disease COPD chronic recurrent abdominal pain presents to ED for evaluation for recurrent abdominal pain.  This is a chronic recurrent issue for more than a year and a half.  Started couple days ago again.  Right flank radiating right abdomen.  Associate nausea vomiting.  No blood per orifice.  No fevers or chills.  Patient states she has had thorough outpatient workup including EGD colonoscopy multiple CT scans of without any definitive etiology.  No other complaints.    Objective:   Past Medical History:    Asthma (Formerly [facility])    Attention deficit hyperactivity disorder (ADHD)    Back problem    Bipolar 1 disorder (Formerly [facility])    CKD (chronic kidney disease) stage 3, GFR 30-59 ml/min (Formerly [facility])    Dr Meeks    Congestive heart disease (Formerly [facility])    COPD (chronic obstructive pulmonary disease) (Formerly [facility])    Coronary atherosclerosis    Deep vein thrombosis (Formerly [facility])    at age 17 R/T cast    Depression    Diabetes (Formerly [facility])    Essential hypertension    3/21 echo: severe concentric LVH with normal EF and no MR or pHTN    Extrinsic asthma, unspecified    Heart attack (Formerly [facility])    2016- angiogram- no intervention    Heart valve disease    mitral valve repair in 1994/    High blood pressure    High cholesterol    History of mitral valve repair    Hyperlipidemia    Low back pain    tight and stiff after sweeping and mopping    LVH (left ventricular hypertrophy)    Migraines    Mixed hyperlipidemia     HDL 38 LDL 97 VLDL 57     Monoclonal gammopathy    IgG kappa     MVP (mitral valve prolapse)    Repair 1994 at [facility]; echoes as recently as 3/21 show mild or trivial MR and no stenosis    Neuropathy    Osteoarthritis    hip ,knees     Pneumonia due to organism    Pulmonary embolism (HCC)    Renal disorder    Stroke (HCC)    TIA (transient ischemic attack)    Initial history of left-sided weakness and slurred speech. (+) cocaine. MRI of the brain, CT angiogram of the head and neck, and 2D echo are all unremarkable.     TMJ (dislocation of temporomandibular joint)    Troponin level elevated    Trop 60 60 47 with TIA and no CP: Lexiscan negative with EF 51              Past Surgical History:   Procedure Laterality Date    Av fistula revision, open Left     Colonoscopy N/A 2023    Procedure: COLONOSCOPY;  Surgeon: Heath Vu MD;  Location:  ENDOSCOPY    Colonoscopy N/A 2023    Procedure: COLONOSCOPY with cold snare polypectomy and forcep polypectomy;  Surgeon: Ousmane Suarez MD;  Location:  ENDOSCOPY    Colonoscopy & polypectomy      Egd  2019    Duodenitis. Biopsied. EUS for weight loss was negative    Heart surgery      Hernia surgery  2022    Dr Barnes    Laminectomy,>2 sgmt,lumbar  2018    L4-L5 Decomp Discectomy ROEM L4-L5    Mitralplasty w cp bypass      [place]: Repair    Repair rotator cuff,chronic Left     torn and had a ruptured bicep    Valve repair      mitral valve                Social History     Socioeconomic History    Marital status:    Tobacco Use    Smoking status: Former     Current packs/day: 0.00     Average packs/day: 1 pack/day for 27.0 years (27.0 ttl pk-yrs)     Types: Cigarettes     Start date: 1995     Quit date: 2022     Years since quittin.3    Smokeless tobacco: Never   Vaping Use    Vaping status: Never Used   Substance and Sexual Activity    Alcohol use: No    Drug use: No     Social Determinants of Health     Financial Resource Strain: Medium Risk (2024)    Received from [facility]    Overall Financial Resource Strain (CARDIA)     Difficulty of Paying Living Expenses: Somewhat hard   Food Insecurity: No Food Insecurity (2024)     Food Insecurity     Food Insecurity: Never true   Transportation Needs: No Transportation Needs (6/14/2024)    Transportation Needs     Lack of Transportation: No   Physical Activity: Inactive (5/7/2024)    Received from [facility]    Exercise Vital Sign     Days of Exercise per Week: 0 days     Minutes of Exercise per Session: 0 min   Stress: Stress Concern Present (5/7/2024)    Received from [facility]    [employer] - Occupational Stress Questionnaire     Feeling of Stress : Rather much   Social Connections: Unknown (5/7/2024)    Received from [facility]    Social Connection and Isolation Panel [NHANES]     Frequency of Communication with Friends and Family: More than three times a week     Frequency of Social Gatherings with Friends and Family: More than three times a week     Attends Congregational Services: Never     Active Member of Clubs or Organizations: No     Attends Club or Organization Meetings: Never     Marital Status: Patient unable to answer   Housing Stability: Low Risk  (6/14/2024)    Housing Stability     Housing Instability: No              Review of Systems    Positive for stated complaint: abd pain for over a year and groin pain - started 2 days  Other systems are as noted in HPI.  Constitutional and vital signs reviewed.      All other systems reviewed and negative except as noted above.    Physical Exam     ED Triage Vitals [06/23/24 0844]   BP (!) 189/126   Pulse 109   Resp 20   Temp 98.7 °F (37.1 °C)   Temp src Oral   SpO2 99 %   O2 Device None (Room air)       Current Vitals:   Vital Signs  BP: (!) 186/123  Pulse: 97  Resp: 20  Temp: 98.7 °F (37.1 °C)  Temp src: Oral    Oxygen Therapy  SpO2: 98 %  O2 Device: None (Room air)            Physical Exam  Vitals and nursing note reviewed.   Constitutional:       General: He is not in acute distress.     Appearance: He is well-developed. He is not toxic-appearing.   HENT:      Head:  Normocephalic and atraumatic.   Eyes:      General: No scleral icterus.     Conjunctiva/sclera: Conjunctivae normal.   Cardiovascular:      Rate and Rhythm: Normal rate.   Pulmonary:      Effort: Pulmonary effort is normal. No respiratory distress.   Abdominal:      General: There is no distension.      Tenderness: There is abdominal tenderness. There is no guarding or rebound.   Musculoskeletal:         General: No tenderness. Normal range of motion.      Cervical back: Normal range of motion and neck supple.   Skin:     General: Skin is warm and dry.      Findings: No rash.   Neurological:      Mental Status: He is alert and oriented to person, place, and time.      Motor: No abnormal muscle tone.      Coordination: Coordination normal.   Psychiatric:         Behavior: Behavior normal.              ED Course     Labs Reviewed   COMP METABOLIC PANEL (14) - Abnormal; Notable for the following components:       Result Value    Glucose 100 (*)     Potassium 3.4 (*)     Chloride 97 (*)     BUN 61 (*)     Creatinine 8.82 (*)     Calcium, Total 7.7 (*)     Calculated Osmolality 301 (*)     eGFR-Cr 7 (*)     AST 38 (*)     A/G Ratio 0.9 (*)     All other components within normal limits   LIPASE - Abnormal; Notable for the following components:    Lipase 88 (*)     All other components within normal limits   CBC W/ DIFFERENTIAL - Abnormal; Notable for the following components:    RBC 3.10 (*)     HGB 9.8 (*)     HCT 28.5 (*)     Monocyte Absolute 1.16 (*)     All other components within normal limits   CBC WITH DIFFERENTIAL WITH PLATELET    Narrative:     The following orders were created for panel order CBC With Differential With Platelet.  Procedure                               Abnormality         Status                     ---------                               -----------         ------                     CBC W/ DIFFERENTIAL[[id number]]          Abnormal            Final result                 Please view results for  these tests on the individual orders.   RAINBOW DRAW LAVENDER   RAINBOW DRAW LIGHT GREEN                       MDM           -Tracing on cardiac monitor and pulse oximetry was reviewed by myself.   -The cardiac monitor revealed normal sinus rhythm as interpreted by me. The cardiac monitor was ordered to monitor the patient for dysrhythmia  -Pulse oximetry was interpreted by me and was normal.  Pulse oximeter was ordered to monitor patient for hypoxia.             -Comorbidities did add complexity to the management are mentioned in the HPI above        -I personally reviewed the prior external notes and the medical record to obtain additional history.  Reviewed gastroenterology note April 2024, he has had multiple colonoscopies flex sig without any significant pathology.        -DDX: Includes but not limited to chronic abdominal pain, obstructive-uropathy, appendicitis        -I personally reviewed the CT findings and it shows Diverticulosis.     Please refer to radiology report for official interpretation    CT ABDOMEN+PELVIS KIDNEYSTONE 2D RNDR(NO IV,NO ORAL)(CPT=74176)   Final Result   PROCEDURE:  CT ABDOMEN+PELVIS KIDNEYSTONE 2D RNDR(NO IV,NO    ORAL)(CPT=74176)       COMPARISON:  JANY ANGUIANO, CT ABDOMEN+PELVIS(CPT=74176), 5/05/2024, 6:00    AM.       INDICATIONS:  abd pain for over a year and groin pain - started 2 days       TECHNIQUE:  Unenhanced multislice CT scanning from above the kidneys to    below the urinary bladder.  2D rendering are generated on the CT scanner    workstation to localize potential stones in the cranio-caudal plane.  Dose    reduction techniques were used.    Dose information is transmitted to the ACR (American College of Radiology)    NRDR (National Radiology Data Registry) which includes the Dose Index    Registry.       PATIENT STATED HISTORY: (As transcribed by Technologist)  Right groin and    lower abdomen pain.            FINDINGS:     KIDNEYS:  No mass, obstruction, or  calcification.    BLADDER:  Mild to moderate bladder wall thickening.   ADRENALS:  No mass or enlargement.     LIVER:  No enlargement, atrophy, abnormal density, or significant focal    lesion.     BILIARY:  No visible dilatation or calcification.     PANCREAS:  No lesion, fluid collection, ductal dilatation, or atrophy.     SPLEEN:  No enlargement or focal lesion.     AORTA/VASCULAR:  No aneurysm.     RETROPERITONEUM:  No mass or adenopathy.     BOWEL/MESENTERY:  The appendix is normal.  There is diverticulosis of the    colon without evidence of acute diverticulitis.   ABDOMINAL WALL:  No mass or hernia.     BONES:  No bony lesion or fracture.   PELVIC ORGANS:  Normal for age.     LUNG BASES:  Peripheral interstitial reticular densities at the right lung    base with atelectasis is noted.  Several bullae are noted at the lung    bases.   OTHER:  Negative.                           =====   CONCLUSION:         There is diverticulosis of the colon without evidence of acute    diverticulitis.  The appendix is normal.       No calculi in the kidneys.  No hydronephrosis.               LOCATION:  [facility]           Dictated by (CST): Ryan Hernández MD on 6/23/2024 at 10:18 AM        Finalized by (CST): Ryan Hernández MD on 6/23/2024 at 10:21 AM             Labs Reviewed   COMP METABOLIC PANEL (14) - Abnormal; Notable for the following components:       Result Value    Glucose 100 (*)     Potassium 3.4 (*)     Chloride 97 (*)     BUN 61 (*)     Creatinine 8.82 (*)     Calcium, Total 7.7 (*)     Calculated Osmolality 301 (*)     eGFR-Cr 7 (*)     AST 38 (*)     A/G Ratio 0.9 (*)     All other components within normal limits   LIPASE - Abnormal; Notable for the following components:    Lipase 88 (*)     All other components within normal limits   CBC W/ DIFFERENTIAL - Abnormal; Notable for the following components:    RBC 3.10 (*)     HGB 9.8 (*)     HCT 28.5 (*)     Monocyte Absolute 1.16 (*)     All other components  within normal limits   CBC WITH DIFFERENTIAL WITH PLATELET    Narrative:     The following orders were created for panel order CBC With Differential With Platelet.  Procedure                               Abnormality         Status                     ---------                               -----------         ------                     CBC W/ DIFFERENTIAL[[id number]]          Abnormal            Final result                 Please view results for these tests on the individual orders.   RAINBOW DRAW LAVENDER   RAINBOW DRAW LIGHT GREEN     Medications   sodium chloride 0.9% infusion (125 mL/hr Intravenous New Bag 6/23/24 0859)   HYDROmorphone (Dilaudid) 1 MG/ML injection 0.5 mg (has no administration in time range)   ondansetron (Zofran) 4 MG/2ML injection 4 mg (4 mg Intravenous Given 6/23/24 0858)   HYDROmorphone (Dilaudid) 1 MG/ML injection 0.5 mg (0.5 mg Intravenous Given 6/23/24 0858)   HYDROmorphone (Dilaudid) 1 MG/ML injection 0.5 mg (0.5 mg Intravenous Given 6/23/24 0950)     Patient was given pain control.  No significant abnormalities in CBC lipase is 88 not significantly elevated.  Glucose 100 potassium 3.4.  He was given IV pain control.  CT scan is negative for any acute pathology.  Pain is chronic recurrent.  I will have him follow-up with his gastroenterologist.  Return to ER if any concerns or problems.  Discharged stable condition                                          Medical Decision Making      Disposition and Plan     Clinical Impression:  1. Chronic abdominal pain         Disposition:  Discharge  6/23/2024 10:52 am    Follow-up:  Heath Vu MD  [facility] [place] [address] [address]  [phone]    Schedule an appointment as soon as possible for a visit            Medications Prescribed:  Current Discharge Medication List

## 2024-06-23 NOTE — ED INITIAL ASSESSMENT (HPI)
Pt to ed with c/o abdominal pain with radiation char right groin with swelling to testicles x 2 days, + vomiting.   Dialysis yesterday

## 2024-06-25 ENCOUNTER — APPOINTMENT (OUTPATIENT)
Dept: GENERAL RADIOLOGY | Age: 44
End: 2024-06-25
Attending: EMERGENCY MEDICINE

## 2024-06-25 ENCOUNTER — HOSPITAL ENCOUNTER (EMERGENCY)
Age: 44
Discharge: HOME | End: 2024-06-25
Attending: EMERGENCY MEDICINE

## 2024-06-25 ENCOUNTER — APPOINTMENT (OUTPATIENT)
Dept: CT IMAGING | Age: 44
End: 2024-06-25
Attending: EMERGENCY MEDICINE

## 2024-06-25 VITALS
TEMPERATURE: 98 F | OXYGEN SATURATION: 92 % | HEIGHT: 74 IN | RESPIRATION RATE: 22 BRPM | HEART RATE: 105 BPM | WEIGHT: 240 LBS | BODY MASS INDEX: 30.8 KG/M2 | SYSTOLIC BLOOD PRESSURE: 189 MMHG | DIASTOLIC BLOOD PRESSURE: 131 MMHG

## 2024-06-25 DIAGNOSIS — J44.1 COPD EXACERBATION (HCC): ICD-10-CM

## 2024-06-25 DIAGNOSIS — K86.1 CHRONIC PANCREATITIS, UNSPECIFIED PANCREATITIS TYPE (HCC): ICD-10-CM

## 2024-06-25 DIAGNOSIS — Z99.2 STAGE 5 CHRONIC KIDNEY DISEASE ON CHRONIC DIALYSIS (HCC): ICD-10-CM

## 2024-06-25 DIAGNOSIS — G89.29 CHRONIC ABDOMINAL PAIN: Primary | ICD-10-CM

## 2024-06-25 DIAGNOSIS — N18.6 STAGE 5 CHRONIC KIDNEY DISEASE ON CHRONIC DIALYSIS (HCC): ICD-10-CM

## 2024-06-25 DIAGNOSIS — R10.9 CHRONIC ABDOMINAL PAIN: Primary | ICD-10-CM

## 2024-06-25 LAB
ALBUMIN SERPL-MCNC: 3.1 G/DL (ref 3.4–5)
ALBUMIN/GLOB SERPL: 0.8 {RATIO} (ref 1–2)
ALP LIVER SERPL-CCNC: 83 U/L
ALT SERPL-CCNC: 28 U/L
ANION GAP SERPL CALC-SCNC: 10 MMOL/L (ref 0–18)
AST SERPL-CCNC: 45 U/L (ref 15–37)
BASE EXCESS BLD CALC-SCNC: -4 MMOL/L
BASOPHILS # BLD AUTO: 0.08 X10(3) UL (ref 0–0.2)
BASOPHILS NFR BLD AUTO: 0.9 %
BILIRUB SERPL-MCNC: 0.4 MG/DL (ref 0.1–2)
BUN BLD-MCNC: 81 MG/DL (ref 9–23)
CALCIUM BLD-MCNC: 6.7 MG/DL (ref 8.5–10.1)
CHLORIDE SERPL-SCNC: 106 MMOL/L (ref 98–112)
CO2 BLD-SCNC: 22 MMOL/L (ref 22–32)
CO2 SERPL-SCNC: 22 MMOL/L (ref 21–32)
CREAT BLD-MCNC: 9.52 MG/DL
EGFRCR SERPLBLD CKD-EPI 2021: 6 ML/MIN/1.73M2 (ref 60–?)
EOSINOPHIL # BLD AUTO: 0.25 X10(3) UL (ref 0–0.7)
EOSINOPHIL NFR BLD AUTO: 3 %
ERYTHROCYTE [DISTWIDTH] IN BLOOD BY AUTOMATED COUNT: 13.1 %
GLOBULIN PLAS-MCNC: 3.7 G/DL (ref 2.8–4.4)
GLUCOSE BLD-MCNC: 161 MG/DL (ref 70–99)
HCO3 BLD-SCNC: 20.7 MEQ/L
HCT VFR BLD AUTO: 25.1 %
HGB BLD-MCNC: 8.6 G/DL
IMM GRANULOCYTES # BLD AUTO: 0.04 X10(3) UL (ref 0–1)
IMM GRANULOCYTES NFR BLD: 0.5 %
LIPASE SERPL-CCNC: 118 U/L (ref 13–75)
LYMPHOCYTES # BLD AUTO: 1.05 X10(3) UL (ref 1–4)
LYMPHOCYTES NFR BLD AUTO: 12.4 %
MCH RBC QN AUTO: 31.9 PG (ref 26–34)
MCHC RBC AUTO-ENTMCNC: 34.3 G/DL (ref 31–37)
MCV RBC AUTO: 93 FL
MONOCYTES # BLD AUTO: 0.81 X10(3) UL (ref 0.1–1)
MONOCYTES NFR BLD AUTO: 9.6 %
NEUTROPHILS # BLD AUTO: 6.22 X10 (3) UL (ref 1.5–7.7)
NEUTROPHILS # BLD AUTO: 6.22 X10(3) UL (ref 1.5–7.7)
NEUTROPHILS NFR BLD AUTO: 73.6 %
OSMOLALITY SERPL CALC.SUM OF ELEC: 314 MOSM/KG (ref 275–295)
PCO2 BLD: 33.7 MMHG
PH BLD: 7.4 [PH]
PLATELET # BLD AUTO: 231 10(3)UL (ref 150–450)
PO2 BLD: 28 MMHG
POTASSIUM SERPL-SCNC: 3.3 MMOL/L (ref 3.5–5.1)
PROT SERPL-MCNC: 6.8 G/DL (ref 6.4–8.2)
RBC # BLD AUTO: 2.7 X10(6)UL
SAO2 % BLD: 54 %
SODIUM SERPL-SCNC: 138 MMOL/L (ref 136–145)
WBC # BLD AUTO: 8.5 X10(3) UL (ref 4–11)

## 2024-06-25 PROCEDURE — 71045 X-RAY EXAM CHEST 1 VIEW: CPT | Performed by: EMERGENCY MEDICINE

## 2024-06-25 PROCEDURE — 83690 ASSAY OF LIPASE: CPT | Performed by: EMERGENCY MEDICINE

## 2024-06-25 PROCEDURE — 99285 EMERGENCY DEPT VISIT HI MDM: CPT

## 2024-06-25 PROCEDURE — 80053 COMPREHEN METABOLIC PANEL: CPT | Performed by: EMERGENCY MEDICINE

## 2024-06-25 PROCEDURE — 96375 TX/PRO/DX INJ NEW DRUG ADDON: CPT

## 2024-06-25 PROCEDURE — 96374 THER/PROPH/DIAG INJ IV PUSH: CPT

## 2024-06-25 PROCEDURE — 74177 CT ABD & PELVIS W/CONTRAST: CPT | Performed by: EMERGENCY MEDICINE

## 2024-06-25 PROCEDURE — 85025 COMPLETE CBC W/AUTO DIFF WBC: CPT | Performed by: EMERGENCY MEDICINE

## 2024-06-25 PROCEDURE — 82803 BLOOD GASES ANY COMBINATION: CPT

## 2024-06-25 PROCEDURE — 99284 EMERGENCY DEPT VISIT MOD MDM: CPT

## 2024-06-25 RX ORDER — KETOROLAC TROMETHAMINE 15 MG/ML
15 INJECTION, SOLUTION INTRAMUSCULAR; INTRAVENOUS ONCE
Status: COMPLETED | OUTPATIENT
Start: 2024-06-25 | End: 2024-06-25

## 2024-06-25 RX ORDER — DIPHENHYDRAMINE HYDROCHLORIDE 50 MG/ML
50 INJECTION INTRAMUSCULAR; INTRAVENOUS ONCE
Status: COMPLETED | OUTPATIENT
Start: 2024-06-25 | End: 2024-06-25

## 2024-06-25 RX ORDER — ALBUTEROL SULFATE 90 UG/1
8 AEROSOL, METERED RESPIRATORY (INHALATION) ONCE
Status: COMPLETED | OUTPATIENT
Start: 2024-06-25 | End: 2024-06-25

## 2024-06-25 RX ORDER — ONDANSETRON 2 MG/ML
4 INJECTION INTRAMUSCULAR; INTRAVENOUS ONCE
Status: COMPLETED | OUTPATIENT
Start: 2024-06-25 | End: 2024-06-25

## 2024-06-25 RX ORDER — TAMSULOSIN HYDROCHLORIDE 0.4 MG/1
0.4 CAPSULE ORAL DAILY
COMMUNITY
Start: 2024-05-28

## 2024-06-25 RX ORDER — METOCLOPRAMIDE HYDROCHLORIDE 5 MG/ML
10 INJECTION INTRAMUSCULAR; INTRAVENOUS ONCE
Status: COMPLETED | OUTPATIENT
Start: 2024-06-25 | End: 2024-06-25

## 2024-06-25 NOTE — DISCHARGE INSTRUCTIONS
Take 1000 mg acetaminophen (2 Tylenol tablets) and/or 600 mg ibuprofen (3 Advil tablets) every 6 hours as needed for pain or fever.    Keep your dialysis appointment today

## 2024-06-25 NOTE — ED INITIAL ASSESSMENT (HPI)
PT to the ED for evaluation of lower abdominal pain and groin pain. Pt reports this is a chronic issue, but it flared up last week and he was seen in the ED for it againa couple of days ago. PT states they did tests but \"didn't find anything and thinks maybe it was a kidney stone.\"

## 2024-06-25 NOTE — ED PROVIDER NOTES
Patient Seen in: [facility] In [place]      History     Chief Complaint   Patient presents with    Abdomen/Flank Pain    Nausea/Vomiting/Diarrhea     Stated Complaint: SOB, abdominal RLQ pain with N/V    Subjective:     HPI    44-year-old male with bipolar 1 disorder (Lamictal, bupropion,Ariprazole, fluoxitene), hypertension (clonidine, carvedilol), COPD, CHF (isosorbide), diabetes, CAD, stroke who presented with a possible recurrence of pancreatitis, as suggested by the presence of abdominal pain. The pain has been ongoing for approximately two years. The patient also reported feeling nauseated with episodes of vomitingand no hematemesis.  He did report occasionally noticing blood in their stool. The patient did not report having a fever. The pain was described as being most severe in the lower abdomen, with radiation upwards and around the body. The patient did not indicate a preference for leaning to the right or left to alleviate the pain. Upon physical x. The patient reported adherence to their prescribed medications. They live with someone, but it was not clarified if this person was present during the consultation or would be coming later.     Objective:   Past Medical History:    Asthma (HCC)    Attention deficit hyperactivity disorder (ADHD)    Back problem    Bipolar 1 disorder (HCC)    CKD (chronic kidney disease) stage 3, GFR 30-59 ml/min (HCC)    Dr Meesk    Congestive heart disease (HCC)    COPD (chronic obstructive pulmonary disease) (HCC)    Coronary atherosclerosis    Deep vein thrombosis (HCC)    at age 17 R/T cast    Depression    Diabetes (HCC)    Essential hypertension    3/21 echo: severe concentric LVH with normal EF and no MR or pHTN    Extrinsic asthma, unspecified    Heart attack (HCC)    2016- angiogram- no intervention    Heart valve disease    mitral valve repair in 1994/    High blood pressure    High cholesterol    History of mitral valve repair    Hyperlipidemia     Low back pain    tight and stiff after sweeping and mopping    LVH (left ventricular hypertrophy)    Migraines    Mixed hyperlipidemia     HDL 38 LDL 97 VLDL 57     Monoclonal gammopathy    IgG kappa     MVP (mitral valve prolapse)    Repair  at [facility]; echoes as recently as 3/21 show mild or trivial MR and no stenosis    Neuropathy    Osteoarthritis    hip ,knees    Pneumonia due to organism    Pulmonary embolism (HCC)    Renal disorder    Stroke (HCC)    TIA (transient ischemic attack)    Initial history of left-sided weakness and slurred speech. (+) cocaine. MRI of the brain, CT angiogram of the head and neck, and 2D echo are all unremarkable.     TMJ (dislocation of temporomandibular joint)    Troponin level elevated    Trop 60 60 47 with TIA and no CP: Lexiscan negative with EF 51              Past Surgical History:   Procedure Laterality Date    Av fistula revision, open Left     Colonoscopy N/A 2023    Procedure: COLONOSCOPY;  Surgeon: Heath Vu MD;  Location:  ENDOSCOPY    Colonoscopy N/A 2023    Procedure: COLONOSCOPY with cold snare polypectomy and forcep polypectomy;  Surgeon: Ousmane Suarez MD;  Location:  ENDOSCOPY    Colonoscopy & polypectomy  2019    [facility]  2019    Duodenitis. Biopsied. EUS for weight loss was negative    Heart surgery      Hernia surgery  2022    Dr Barnes    Laminectomy,>2 sgmt,lumbar  2018    L4-L5 Decomp Discectomy ROEM L4-L5    Mitralplasty w cp bypass      [facility]: Repair    Repair rotator cuff,chronic Left     torn and had a ruptured bicep    Valve repair      mitral valve                Social History     Socioeconomic History    Marital status:    Tobacco Use    Smoking status: Former     Current packs/day: 0.00     Average packs/day: 1 pack/day for 27.0 years (27.0 ttl pk-yrs)     Types: Cigarettes     Start date: 1995     Quit date: 2022     Years since quittin.3     Passive exposure: Never     Smokeless tobacco: Never   Vaping Use    Vaping status: Never Used   Substance and Sexual Activity    Alcohol use: No    Drug use: No     Social Determinants of Health     Financial Resource Strain: Medium Risk (5/7/2024)    Received from [facility]    Overall Financial Resource Strain (CARDIA)     Difficulty of Paying Living Expenses: Somewhat hard   Food Insecurity: No Food Insecurity (6/14/2024)    Food Insecurity     Food Insecurity: Never true   Transportation Needs: No Transportation Needs (6/14/2024)    Transportation Needs     Lack of Transportation: No   Physical Activity: Inactive (5/7/2024)    Received from [facility]    Exercise Vital Sign     Days of Exercise per Week: 0 days     Minutes of Exercise per Session: 0 min   Stress: Stress Concern Present (5/7/2024)    Received from [facility]    [place] [place] [employer] - Occupational Stress Questionnaire     Feeling of Stress : Rather much   Social Connections: Unknown (5/7/2024)    Received from [facility]    Social Connection and Isolation Panel [NHANES]     Frequency of Communication with Friends and Family: More than three times a week     Frequency of Social Gatherings with Friends and Family: More than three times a week     Attends Catholic Services: Never     Active Member of Clubs or Organizations: No     Attends Club or Organization Meetings: Never     Marital Status: Patient unable to answer   Housing Stability: Low Risk  (6/14/2024)    Housing Stability     Housing Instability: No              Review of Systems    Positive for stated complaint: SOB, abdominal RLQ pain with N/V  Other systems are as noted in HPI.  Constitutional and vital signs reviewed.      All other systems reviewed and negative except as noted above.    Physical Exam     ED Triage Vitals   BP 06/25/24 0531 (!) 191/125   Pulse 06/25/24 0531 104   Resp 06/25/24 0531 22   Temp 06/25/24 0626 97.7 °F (36.5 °C)   Temp src  06/25/24 0626 Temporal   SpO2 06/25/24 0531 93 %   O2 Device 06/25/24 0531 None (Room air)       Current:BP (!) 189/131   Pulse 105   Temp 97.7 °F (36.5 °C) (Temporal)   Resp 22   Ht 188 cm (6' 2\")   Wt 108.9 kg   SpO2 92%   BMI 30.81 kg/m²     General:  Vitals as listed.  No acute distress.  Anxious appearing  HEENT: Sclerae anicteric.  Conjunctivae show no pallor.  Oropharynx clear, mucous membranes moist   Lungs: good air exchange   Abdomen: Lower abdominal tenderness  Extremities: no edema, normal peripheral pulses   Neuro: Alert oriented and nonfocal      ED Course     Labs Reviewed   COMP METABOLIC PANEL (14) - Abnormal; Notable for the following components:       Result Value    Glucose 161 (*)     Potassium 3.3 (*)     BUN 81 (*)     Creatinine 9.52 (*)     Calcium, Total 6.7 (*)     Calculated Osmolality 314 (*)     eGFR-Cr 6 (*)     AST 45 (*)     Albumin 3.1 (*)     A/G Ratio 0.8 (*)     All other components within normal limits   LIPASE - Abnormal; Notable for the following components:    Lipase 118 (*)     All other components within normal limits   CBC W/ DIFFERENTIAL - Abnormal; Notable for the following components:    RBC 2.70 (*)     HGB 8.6 (*)     HCT 25.1 (*)     All other components within normal limits   CBC WITH DIFFERENTIAL WITH PLATELET    Narrative:     The following orders were created for panel order CBC With Differential With Platelet.  Procedure                               Abnormality         Status                     ---------                               -----------         ------                     CBC W/ DIFFERENTIAL[[id number]]          Abnormal            Final result                 Please view results for these tests on the individual orders.   POCT ISTAT G4 CARTRIDGE     XR CHEST AP PORTABLE  (CPT=71045)    Result Date: 6/25/2024  CONCLUSION:  Bilateral interstitial prominence, which may be related to edema versus infectious/inflammatory process.  Small right pleural  effusion, similar to prior.   LOCATION:  [facility]      Dictated by (CST): Duy De La Rosa MD on 6/25/2024 at 9:07 AM     Finalized by (CST): Duy De La Rosa MD on 6/25/2024 at 9:08 AM       CT ABDOMEN+PELVIS(CONTRAST ONLY)(CPT=74177)    Result Date: 6/25/2024  CONCLUSION:  1. Circumferential urinary bladder wall thickening, correlate for cystitis. 2. Colonic diverticulosis, without evidence of acute diverticulitis. 3. Ground-glass opacity within the lungs with dependent interlobular septal thickening, findings may be related to pulmonary edema versus infectious/inflammatory process.  There is a small right pleural effusion, which is slightly increased when compared  to 6/23/2024.   LOCATION:  [facility]   Dictated by (CST): Duy De La Rosa MD on 6/25/2024 at 7:37 AM     Finalized by (CST): Duy De La Rosa MD on 6/25/2024 at 7:41 AM        TODAY      6/14/24    ED COURSE and MDM     Differential diagnosis before testing included abdominal pain caused by a chronic process versus ruptured abdominal aortic aneurysm, a medical condition that poses a threat to life.    I reviewed prior external notes including abdominal pelvic CT scan done 6/23/2024 that showed diverticulosis of the colon and no other abnormalities.    Patient unable to produce urine sample.    Given Zofran followed by Reglan/Benadryl for control of his nausea.  Given Toradol for pain control.  To use Tylenol/ibuprofen for discomfort.    Patient did have episodes of saturation below 90%.  This occurred mostly when needed started sleeping.  This was documented in his old medical records.  I attributed it to COPD.    I have discussed with the patient the results of testing, differential diagnosis, and treatment plan. They expressed clear understanding of these instructions and agrees to the plan provided.    Disposition and Plan     Clinical Impression:  1. Chronic abdominal pain    2. COPD exacerbation (HCC)    3. Stage 5 chronic kidney disease on chronic dialysis  (HCC)    4. Chronic pancreatitis, unspecified pancreatitis type (HCC)         Disposition:  Discharge  6/25/2024 10:47 am    Follow-up:  Prieto Novak MD  [address]  [address]  [facility] [address]  [phone]    Schedule an appointment as soon as possible for a visit in 3 day(s)          Medications Prescribed:  Discharge Medication List as of 6/25/2024 11:01 AM

## 2024-08-08 ENCOUNTER — OFFICE VISIT (OUTPATIENT)
Dept: FAMILY MEDICINE CLINIC | Facility: CLINIC | Age: 44
End: 2024-08-08
Payer: COMMERCIAL

## 2024-08-08 ENCOUNTER — APPOINTMENT (OUTPATIENT)
Dept: GENERAL RADIOLOGY | Age: 44
End: 2024-08-08
Attending: EMERGENCY MEDICINE
Payer: MEDICARE

## 2024-08-08 ENCOUNTER — HOSPITAL ENCOUNTER (EMERGENCY)
Age: 44
Discharge: HOME | End: 2024-08-08
Attending: EMERGENCY MEDICINE
Payer: MEDICARE

## 2024-08-08 VITALS — HEART RATE: 92 BPM | TEMPERATURE: 97 F | OXYGEN SATURATION: 96 %

## 2024-08-08 VITALS
WEIGHT: 240 LBS | SYSTOLIC BLOOD PRESSURE: 174 MMHG | TEMPERATURE: 99 F | RESPIRATION RATE: 22 BRPM | BODY MASS INDEX: 30.8 KG/M2 | DIASTOLIC BLOOD PRESSURE: 130 MMHG | OXYGEN SATURATION: 97 % | HEIGHT: 74 IN | HEART RATE: 92 BPM

## 2024-08-08 DIAGNOSIS — J18.9 PNEUMONIA OF RIGHT LOWER LOBE DUE TO INFECTIOUS ORGANISM: Primary | ICD-10-CM

## 2024-08-08 DIAGNOSIS — R06.02 SOB (SHORTNESS OF BREATH): Primary | ICD-10-CM

## 2024-08-08 LAB
ALBUMIN SERPL-MCNC: 3.4 G/DL (ref 3.4–5)
ALBUMIN/GLOB SERPL: 0.9 {RATIO} (ref 1–2)
ALP LIVER SERPL-CCNC: 100 U/L
ALT SERPL-CCNC: 32 U/L
ANION GAP SERPL CALC-SCNC: 9 MMOL/L (ref 0–18)
AST SERPL-CCNC: 25 U/L (ref 15–37)
BASOPHILS # BLD AUTO: 0.08 X10(3) UL (ref 0–0.2)
BASOPHILS NFR BLD AUTO: 1.6 %
BILIRUB SERPL-MCNC: 0.4 MG/DL (ref 0.1–2)
BUN BLD-MCNC: 42 MG/DL (ref 9–23)
CALCIUM BLD-MCNC: 7.9 MG/DL (ref 8.5–10.1)
CHLORIDE SERPL-SCNC: 101 MMOL/L (ref 98–112)
CO2 SERPL-SCNC: 27 MMOL/L (ref 21–32)
CREAT BLD-MCNC: 8.3 MG/DL
EGFRCR SERPLBLD CKD-EPI 2021: 7 ML/MIN/1.73M2 (ref 60–?)
EOSINOPHIL # BLD AUTO: 0.16 X10(3) UL (ref 0–0.7)
EOSINOPHIL NFR BLD AUTO: 3.1 %
ERYTHROCYTE [DISTWIDTH] IN BLOOD BY AUTOMATED COUNT: 13.6 %
GLOBULIN PLAS-MCNC: 3.7 G/DL (ref 2.8–4.4)
GLUCOSE BLD-MCNC: 179 MG/DL (ref 70–99)
HCT VFR BLD AUTO: 32.4 %
HGB BLD-MCNC: 10.8 G/DL
IMM GRANULOCYTES # BLD AUTO: 0.01 X10(3) UL (ref 0–1)
IMM GRANULOCYTES NFR BLD: 0.2 %
LACTATE SERPL-SCNC: 2.6 MMOL/L (ref 0.4–2)
LYMPHOCYTES # BLD AUTO: 1.21 X10(3) UL (ref 1–4)
LYMPHOCYTES NFR BLD AUTO: 23.8 %
MCH RBC QN AUTO: 31.9 PG (ref 26–34)
MCHC RBC AUTO-ENTMCNC: 33.3 G/DL (ref 31–37)
MCV RBC AUTO: 95.6 FL
MONOCYTES # BLD AUTO: 0.66 X10(3) UL (ref 0.1–1)
MONOCYTES NFR BLD AUTO: 13 %
NEUTROPHILS # BLD AUTO: 2.97 X10 (3) UL (ref 1.5–7.7)
NEUTROPHILS # BLD AUTO: 2.97 X10(3) UL (ref 1.5–7.7)
NEUTROPHILS NFR BLD AUTO: 58.3 %
OSMOLALITY SERPL CALC.SUM OF ELEC: 299 MOSM/KG (ref 275–295)
PLATELET # BLD AUTO: 225 10(3)UL (ref 150–450)
POTASSIUM SERPL-SCNC: 3.4 MMOL/L (ref 3.5–5.1)
PROT SERPL-MCNC: 7.1 G/DL (ref 6.4–8.2)
RBC # BLD AUTO: 3.39 X10(6)UL
SODIUM SERPL-SCNC: 137 MMOL/L (ref 136–145)
WBC # BLD AUTO: 5.1 X10(3) UL (ref 4–11)

## 2024-08-08 PROCEDURE — 87040 BLOOD CULTURE FOR BACTERIA: CPT | Performed by: EMERGENCY MEDICINE

## 2024-08-08 PROCEDURE — 99285 EMERGENCY DEPT VISIT HI MDM: CPT

## 2024-08-08 PROCEDURE — 96365 THER/PROPH/DIAG IV INF INIT: CPT

## 2024-08-08 PROCEDURE — 93010 ELECTROCARDIOGRAM REPORT: CPT

## 2024-08-08 PROCEDURE — 99284 EMERGENCY DEPT VISIT MOD MDM: CPT

## 2024-08-08 PROCEDURE — 83605 ASSAY OF LACTIC ACID: CPT | Performed by: EMERGENCY MEDICINE

## 2024-08-08 PROCEDURE — 36415 COLL VENOUS BLD VENIPUNCTURE: CPT

## 2024-08-08 PROCEDURE — 71045 X-RAY EXAM CHEST 1 VIEW: CPT | Performed by: EMERGENCY MEDICINE

## 2024-08-08 PROCEDURE — 93005 ELECTROCARDIOGRAM TRACING: CPT

## 2024-08-08 PROCEDURE — 80053 COMPREHEN METABOLIC PANEL: CPT | Performed by: EMERGENCY MEDICINE

## 2024-08-08 PROCEDURE — 99215 OFFICE O/P EST HI 40 MIN: CPT | Performed by: NURSE PRACTITIONER

## 2024-08-08 PROCEDURE — 85025 COMPLETE CBC W/AUTO DIFF WBC: CPT | Performed by: EMERGENCY MEDICINE

## 2024-08-08 RX ORDER — ACETAMINOPHEN 500 MG
1000 TABLET ORAL ONCE
Status: COMPLETED | OUTPATIENT
Start: 2024-08-08 | End: 2024-08-08

## 2024-08-08 RX ORDER — HYDROCODONE BITARTRATE AND ACETAMINOPHEN 5; 325 MG/1; MG/1
1-2 TABLET ORAL EVERY 6 HOURS PRN
Qty: 20 TABLET | Refills: 0 | Status: SHIPPED | OUTPATIENT
Start: 2024-08-08 | End: 2024-08-13

## 2024-08-08 RX ORDER — AMOXICILLIN 500 MG/1
1000 TABLET, FILM COATED ORAL 3 TIMES DAILY
Qty: 42 TABLET | Refills: 0 | Status: SHIPPED | OUTPATIENT
Start: 2024-08-08 | End: 2024-08-15

## 2024-08-08 RX ORDER — AZITHROMYCIN 250 MG/1
TABLET, FILM COATED ORAL
Qty: 6 TABLET | Refills: 0 | Status: SHIPPED | OUTPATIENT
Start: 2024-08-08 | End: 2024-08-13

## 2024-08-08 NOTE — ED INITIAL ASSESSMENT (HPI)
Patient presents with worsening shortness of breath, body aches, chills, and \"crackling\" in his lungs.

## 2024-08-08 NOTE — PROGRESS NOTES
Pt presented with shortness of breath.  Patient reports it started last night and has felt diaphoretic.  Patient has significant medical history and was hospitalized last week for similar symptoms.      Pulse 92   Temp 97 °F (36.1 °C)   SpO2 96%     Accompanied by: self  After triage, a higher level of care was recommended to pt.  Discussed limitations of WIC and need for further evaluation due to needing further evaluation  Referred to: ED, patient prefers to drive to PED  Patient verbalized understanding of rationale for further evaluation and was stable upon discharge.

## 2024-08-08 NOTE — ED PROVIDER NOTES
Patient Seen in: [facility] In [place]      History     Chief Complaint   Patient presents with    Shortness Of Breath     Stated Complaint: From [facility]- shortness of breath since last night    Subjective:   HPI    44-year-old male with a complicated medical history including end-stage renal disease on dialysis, diabetes mellitus, hypertension and coronary artery disease presents to the emergency department complaining of pleuritic right-sided chest pain with congestion for the past 2 days.  Sweats and chills but no documented fever.  Eels like pneumonia which she has experienced in the past.  He went to dialysis yesterday and symptoms were not improved after treatment.  No GI complaints.  No analgesic use.    Objective:   Past Medical History:    Asthma ([facility])    Attention deficit hyperactivity disorder (ADHD)    Back problem    Bipolar 1 disorder ([facility])    CKD (chronic kidney disease) stage 3, GFR 30-59 ml/min ([facility])    Dr Meeks    Congestive heart disease ([facility])    COPD (chronic obstructive pulmonary disease) ([facility])    Coronary atherosclerosis    Deep vein thrombosis ([facility])    at age 17 R/T cast    Depression    Diabetes ([facility])    Essential hypertension    3/21 echo: severe concentric LVH with normal EF and no MR or pHTN    Extrinsic asthma, unspecified    Heart attack ([facility])    2016- angiogram- no intervention    Heart valve disease    mitral valve repair in 1994/    High blood pressure    High cholesterol    History of mitral valve repair    Hyperlipidemia    Low back pain    tight and stiff after sweeping and mopping    LVH (left ventricular hypertrophy)    Migraines    Mixed hyperlipidemia     HDL 38 LDL 97 VLDL 57     Monoclonal gammopathy    IgG kappa     MVP (mitral valve prolapse)    Repair 1994 at [facility]; echoes as recently as 3/21 show mild or trivial MR and no stenosis    Neuropathy    Osteoarthritis    hip ,knees    Pneumonia due to organism    Pulmonary embolism (HCC)    Renal  disorder    Stroke (HCC)    TIA (transient ischemic attack)    Initial history of left-sided weakness and slurred speech. (+) cocaine. MRI of the brain, CT angiogram of the head and neck, and 2D echo are all unremarkable.     TMJ (dislocation of temporomandibular joint)    Troponin level elevated    Trop 60 60 47 with TIA and no CP: Lexiscan negative with EF 51              Past Surgical History:   Procedure Laterality Date    Av fistula revision, open Left     Colonoscopy N/A 2023    Procedure: COLONOSCOPY;  Surgeon: Heath Vu MD;  Location:  ENDOSCOPY    Colonoscopy N/A 2023    Procedure: COLONOSCOPY with cold snare polypectomy and forcep polypectomy;  Surgeon: Ousmane Suarez MD;  Location:  ENDOSCOPY    Colonoscopy & polypectomy      [facility]  2019    Duodenitis. Biopsied. EUS for weight loss was negative    Heart surgery      Hernia surgery  2022    Dr Barnes    Laminectomy,>2 sgmt,lumbar  2018    L4-L5 Decomp Discectomy [facility] L4-L5    Mitralplasty w cp bypass      [facility]: Repair    Repair rotator cuff,chronic Left     torn and had a ruptured bicep    Valve repair      mitral valve                Social History     Socioeconomic History    Marital status:    Tobacco Use    Smoking status: Former     Current packs/day: 0.00     Average packs/day: 1 pack/day for 27.0 years (27.0 ttl pk-yrs)     Types: Cigarettes     Start date: 1995     Quit date: 2022     Years since quittin.4     Passive exposure: Never    Smokeless tobacco: Never   Vaping Use    Vaping status: Never Used   Substance and Sexual Activity    Alcohol use: No    Drug use: No     Social Determinants of Health     Financial Resource Strain: Medium Risk (2024)    Received from [facility]    Overall Financial Resource Strain (CARDIA)     Difficulty of Paying Living Expenses: Somewhat hard   Food Insecurity: Low Risk  (2024)    Received from [facility] Food Security      Within the past 12 months, the food you bought just didn't last and you didn't have money to get more.: 3     Within the past 12 months, you worried that your food would run out before you got money to buy more.: 3   Transportation Needs: Not At Risk (7/30/2024)    Received from [facility] Transportation Needs     In the past 12 months, has lack of reliable transportation kept you from medical appointments, meetings, work or from getting things needed for daily living?: No   Physical Activity: Inactive (5/7/2024)    Received from [facility]    Exercise Vital Sign     Days of Exercise per Week: 0 days     Minutes of Exercise per Session: 0 min   Stress: Stress Concern Present (5/7/2024)    Received from [facility]    [place] of Occupational Health - Occupational Stress Questionnaire     Feeling of Stress : Rather much   Social Connections: Unknown (5/7/2024)    Received from [facility]    Social Connection and Isolation Panel [NHANES]     Frequency of Communication with Friends and Family: More than three times a week     Frequency of Social Gatherings with Friends and Family: More than three times a week     Attends Pentecostalism Services: Never     Active Member of Clubs or Organizations: No     Attends Club or Organization Meetings: Never     Marital Status: Patient unable to answer   Housing Stability: Not At Risk (7/30/2024)    Received from [facility] Housing     What is your living situation today?: I have a steady place to live     Think about the place you live. Do you have problems with any of the following?: None of the above              Review of Systems    Positive for stated Chief Complaint: Shortness Of Breath    Other systems are as noted in HPI.  Constitutional and vital signs reviewed.      All other systems reviewed and negative except as noted above.    Physical Exam     ED Triage Vitals [08/08/24 1614]   BP (!) 192/119   Pulse 95   Resp  24   Temp 98.8 °F (37.1 °C)   Temp src Oral   SpO2 95 %   O2 Device None (Room air)       Current Vitals:   Vital Signs  BP: (!) 192/119  Pulse: 95  Resp: 24  Temp: 98.8 °F (37.1 °C)  Temp src: Oral    Oxygen Therapy  SpO2: 95 %  O2 Device: None (Room air)            Physical Exam    General Appearance: This is a middle-aged male sitting on a gurney.  He appears to be in mild pain.  Vital signs were reviewed per nurses notes.  Respirations are 24.  Pulse oximetry is 95% on room air.  Monitor reveals a sinus rhythm rate in the 90s.  Initial blood pressure was 192/119.  HEENT: Normocephalic atraumatic.  Anicteric sclera.  Oral mucosa is moist.  Oropharynx is normal.  Neck: No adenopathy or thyromegaly.  Lungs: Rales in the right lung base.  Good air exchange..  Heart exam: Normal S1-S2 without extra sounds or murmurs.  Regular rate and rhythm.  Abdomen is nontender.  Extremities are atraumatic.  Skin is dry without rashes or lesions.  Neuroexam: Awake, conversive and moving all 4 extremities well.    ED Course     Labs Reviewed   CBC WITH DIFFERENTIAL WITH PLATELET - Abnormal; Notable for the following components:       Result Value    RBC 3.39 (*)     HGB 10.8 (*)     HCT 32.4 (*)     All other components within normal limits   COMP METABOLIC PANEL (14) - Abnormal; Notable for the following components:    Glucose 179 (*)     Potassium 3.4 (*)     BUN 42 (*)     Creatinine 8.30 (*)     Calcium, Total 7.9 (*)     Calculated Osmolality 299 (*)     eGFR-Cr 7 (*)     A/G Ratio 0.9 (*)     All other components within normal limits   LACTIC ACID, PLASMA - Abnormal; Notable for the following components:    Lactic Acid 2.6 (*)     All other components within normal limits   LACTIC ACID REFLEX POST POSTIVE   RAINBOW DRAW BLUE   BLOOD CULTURE   BLOOD CULTURE     EKG    Rate, intervals and axes as noted on EKG Report.  Rate: 88  Rhythm: Sinus Rhythm  Reading: LVH.  Left atrial enlargement.  ST and T wave abnormality.  General  surgery prolonged QT.  Agree with EKG report.                 Intravenous access was obtained.  Laboratory studies were drawn including blood cultures and a lactic acid.  Patient has no cough at this time and no sputum production.  Acetaminophen was given for pain.    XR CHEST AP PORTABLE  (CPT=71045)    Result Date: 8/8/2024  PROCEDURE:  XR CHEST AP PORTABLE  (CPT=71045)  TECHNIQUE:  AP chest radiograph was obtained.  COMPARISON:  [facility], XR, XR CHEST AP PORTABLE  (CPT=71045), 6/25/2024, 8:53 AM.  [facility], XR, XR CHEST AP PORTABLE  (CPT=71045), 6/14/2024, 5:12 PM.  INDICATIONS:  From [facility]- shortness of breath since last night  PATIENT STATED HISTORY: (As transcribed by Technologist)  Day 3 right side chest pain and short of breath. Port-a-cath insertion 2yrs ago.    FINDINGS:  Right-sided dialysis catheter is stable.  Cardiac size within normal limits.  Small right-sided pleural effusion with right basilar opacity.  No pneumothorax.            CONCLUSION:  Small right-sided pleural effusion with right basilar atelectasis/infiltrates.   LOCATION:  [facility]      Dictated by (CST): Quinn Bernal MD on 8/08/2024 at 5:20 PM     Finalized by (CST): Quinn Bernal MD on 8/08/2024 at 5:20 PM       I personally reviewed the images myself and went over results with patient.    I viewed the chest x-ray film myself and there is a small right pleural effusion with atelectasis versus infiltrate.  Given the patient's symptoms this is likely an infectious process.  Intravenous Unasyn was administered.    Test results and treatment plan were discussed prior to disposition.  Criteria for return to the emergency department outlined.         MDM      #1.  Right sided chest pain secondary to right lower lobe pneumonia.  Patient has a complex medical history and a recent hospitalization.  Discharged to home on high-dose amoxicillin and  Z-Declan.  White blood cell count is within normal range.  Lactic acid level is modestly  elevated however the patient has chronic renal failure.  No hemodynamically instability, fever or tachycardia to suggest septic picture.                                   Medical Decision Making      Disposition and Plan     Clinical Impression:  1. Pneumonia of right lower lobe due to infectious organism         Disposition:  Discharge  8/8/2024  5:30 pm    Follow-up:  Prieto Novak MD  [address]  [address]  [facility] [address]  [phone]    Call in 1 day(s)            Medications Prescribed:  Current Discharge Medication List        START taking these medications    Details   HYDROcodone-acetaminophen 5-325 MG Oral Tab Take 1-2 tablets by mouth every 6 (six) hours as needed for Pain.  Qty: 20 tablet, Refills: 0    Associated Diagnoses: Pneumonia of right lower lobe due to infectious organism      amoxicillin 500 MG Oral Tab Take 2 tablets (1,000 mg total) by mouth 3 (three) times daily for 7 days.  Qty: 42 tablet, Refills: 0      azithromycin (ZITHROMAX Z-ANA MARIA) 250 MG Oral Tab 500 mg once followed by 250 mg daily x 4 days  Qty: 6 tablet, Refills: 0

## 2024-08-09 LAB
ATRIAL RATE: 88 BPM
P AXIS: 29 DEGREES
P-R INTERVAL: 196 MS
Q-T INTERVAL: 418 MS
QRS DURATION: 106 MS
QTC CALCULATION (BEZET): 505 MS
R AXIS: 33 DEGREES
T AXIS: 111 DEGREES
VENTRICULAR RATE: 88 BPM

## 2024-08-11 ENCOUNTER — HOSPITAL ENCOUNTER (EMERGENCY)
Age: 44
Discharge: HOME | End: 2024-08-11
Attending: EMERGENCY MEDICINE
Payer: MEDICARE

## 2024-08-11 ENCOUNTER — APPOINTMENT (OUTPATIENT)
Dept: GENERAL RADIOLOGY | Age: 44
End: 2024-08-11
Attending: EMERGENCY MEDICINE
Payer: MEDICARE

## 2024-08-11 VITALS
TEMPERATURE: 99 F | RESPIRATION RATE: 18 BRPM | WEIGHT: 240 LBS | BODY MASS INDEX: 30.8 KG/M2 | OXYGEN SATURATION: 97 % | HEIGHT: 74 IN | DIASTOLIC BLOOD PRESSURE: 111 MMHG | HEART RATE: 90 BPM | SYSTOLIC BLOOD PRESSURE: 180 MMHG

## 2024-08-11 DIAGNOSIS — R07.81 PLEURITIC CHEST PAIN: ICD-10-CM

## 2024-08-11 DIAGNOSIS — J18.9 COMMUNITY ACQUIRED PNEUMONIA OF RIGHT LOWER LOBE OF LUNG: Primary | ICD-10-CM

## 2024-08-11 LAB — SARS-COV-2 RNA RESP QL NAA+PROBE: NOT DETECTED

## 2024-08-11 PROCEDURE — 99284 EMERGENCY DEPT VISIT MOD MDM: CPT

## 2024-08-11 PROCEDURE — 71046 X-RAY EXAM CHEST 2 VIEWS: CPT | Performed by: EMERGENCY MEDICINE

## 2024-08-11 RX ORDER — PREDNISONE 20 MG/1
40 TABLET ORAL DAILY
Qty: 10 TABLET | Refills: 0 | Status: SHIPPED | OUTPATIENT
Start: 2024-08-11 | End: 2024-08-16

## 2024-08-11 RX ORDER — TRAMADOL HYDROCHLORIDE 50 MG/1
TABLET ORAL EVERY 6 HOURS PRN
Qty: 10 TABLET | Refills: 0 | Status: SHIPPED | OUTPATIENT
Start: 2024-08-11 | End: 2024-08-16

## 2024-08-11 RX ORDER — PREDNISONE 20 MG/1
40 TABLET ORAL ONCE
Status: COMPLETED | OUTPATIENT
Start: 2024-08-11 | End: 2024-08-11

## 2024-08-11 NOTE — ED INITIAL ASSESSMENT (HPI)
States here and dx with pnuemonia two days ago. States has noted increasing perla and upper back discomfort. States cannot take a deep breath without pain

## 2024-08-11 NOTE — ED PROVIDER NOTES
Patient Seen in: [facility] [place]      History     Chief Complaint   Patient presents with    Difficulty Breathing    Back Pain     Stated Complaint: seen two days ago dx with pnuemonia, increased perla and pain in back and abdomen    Subjective:     HPI    44-year-old male with ESRD/HD (M/W/F though had an additional session yesterday), insulin and diabetes, hypertension, CAD, BP1.  Was diagnosed with possible pneumonia in the right base 3 days ago and put on a Z-Declan and amoxicillin. Reported experiencing symptoms for approximately one week. Raymundo also reported experiencing sweating, a potential symptom of fever, but did not confirm if he had a fever. He also mentioned occasional vomiting.    Objective:   Past Medical History:    Asthma ([facility])    Attention deficit hyperactivity disorder (ADHD)    Back problem    Bipolar 1 disorder ([facility])    CKD (chronic kidney disease) stage 3, GFR 30-59 ml/min ([facility])    Dr Meeks    Congestive heart disease ([facility])    COPD (chronic obstructive pulmonary disease) ([facility])    Coronary atherosclerosis    Deep vein thrombosis ([facility])    at age 17 R/T cast    Depression    Diabetes ([facility])    Essential hypertension    3/21 echo: severe concentric LVH with normal EF and no MR or pHTN    Extrinsic asthma, unspecified    Heart attack ([facility])    2016- angiogram- no intervention    Heart valve disease    mitral valve repair in 1994/    High blood pressure    High cholesterol    History of mitral valve repair    Hyperlipidemia    Low back pain    tight and stiff after sweeping and mopping    LVH (left ventricular hypertrophy)    Migraines    Mixed hyperlipidemia     HDL 38 LDL 97 VLDL 57     Monoclonal gammopathy    IgG kappa     MVP (mitral valve prolapse)    Repair 1994 at [place]; echoes as recently as 3/21 show mild or trivial MR and no stenosis    Neuropathy    Osteoarthritis    hip ,knees    Pneumonia due to organism    Pulmonary embolism (HCC)    Renal disorder     Stroke (HCC)    TIA (transient ischemic attack)    Initial history of left-sided weakness and slurred speech. (+) cocaine. MRI of the brain, CT angiogram of the head and neck, and 2D echo are all unremarkable.     TMJ (dislocation of temporomandibular joint)    Troponin level elevated    Trop 60 60 47 with TIA and no CP: Lexiscan negative with EF 51              Past Surgical History:   Procedure Laterality Date    Av fistula revision, open Left     Colonoscopy N/A 2023    Procedure: COLONOSCOPY;  Surgeon: Heath Vu MD;  Location:  ENDOSCOPY    Colonoscopy N/A 2023    Procedure: COLONOSCOPY with cold snare polypectomy and forcep polypectomy;  Surgeon: Ousmane Suarez MD;  Location:  ENDOSCOPY    Colonoscopy & polypectomy  2019    Egd  2019    Duodenitis. Biopsied. EUS for weight loss was negative    Heart surgery      Hernia surgery  2022    Dr Barnes    Laminectomy,>2 sgmt,lumbar  2018    L4-L5 Decomp Discectomy [facility] L4-L5    Mitralplasty w cp bypass      [place]: Repair    Repair rotator cuff,chronic Left     torn and had a ruptured bicep    Valve repair      mitral valve                Social History     Socioeconomic History    Marital status:    Tobacco Use    Smoking status: Former     Current packs/day: 0.00     Average packs/day: 1 pack/day for 27.0 years (27.0 ttl pk-yrs)     Types: Cigarettes     Start date: 1995     Quit date: 2022     Years since quittin.4     Passive exposure: Never    Smokeless tobacco: Never   Vaping Use    Vaping status: Never Used   Substance and Sexual Activity    Alcohol use: No    Drug use: No     Social Determinants of Health     Financial Resource Strain: Medium Risk (2024)    Received from [facility]    Overall Financial Resource Strain (CARDIA)     Difficulty of Paying Living Expenses: Somewhat hard   Food Insecurity: Low Risk  (2024)    Received from [facility]      Within the past 12 months, the food you bought just didn't last and you didn't have money to get more.: 3     Within the past 12 months, you worried that your food would run out before you got money to buy more.: 3   Transportation Needs: Not At Risk (7/30/2024)    Received from [facility] Transportation Needs     In the past 12 months, has lack of reliable transportation kept you from medical appointments, meetings, work or from getting things needed for daily living?: No   Physical Activity: Inactive (5/7/2024)    Received from [facility]    Exercise Vital Sign     Days of Exercise per Week: 0 days     Minutes of Exercise per Session: 0 min   Stress: Stress Concern Present (5/7/2024)    Received from [facility]    [employer] [place] [employer] - Occupational Stress Questionnaire     Feeling of Stress : Rather much   Social Connections: Unknown (5/7/2024)    Received from [facility]    Social Connection and Isolation Panel [NHANES]     Frequency of Communication with Friends and Family: More than three times a week     Frequency of Social Gatherings with Friends and Family: More than three times a week     Attends Oriental orthodox Services: Never     Active Member of Clubs or Organizations: No     Attends Club or Organization Meetings: Never     Marital Status: Patient unable to answer   Housing Stability: Not At Risk (7/30/2024)    Received from [facility]     What is your living situation today?: I have a steady place to live     Think about the place you live. Do you have problems with any of the following?: None of the above              Review of Systems    Positive for stated complaint: seen two days ago dx with pnuemonia, increased perla and pain in back and abdomen  Other systems are as noted in HPI.  Constitutional and vital signs reviewed.      All other systems reviewed and negative except as noted above.    Physical Exam     ED Triage Vitals  [08/11/24 1701]   BP (!) 181/110   Pulse 90   Resp 18   Temp 98.5 °F (36.9 °C)   Temp src    SpO2 98 %   O2 Device None (Room air)       Current:BP (!) 180/111   Pulse 90   Temp 98.5 °F (36.9 °C)   Resp 18   Ht 188 cm (6' 2\")   Wt 108.9 kg   SpO2 97%   BMI 30.81 kg/m²         General:  Vitals as listed.  No acute distress   HEENT: Sclerae anicteric.  Conjunctivae show no pallor.  Oropharynx clear, mucous membranes moist   Lungs: good air exchange and clear   Heart: regular rate rhythm and no murmur   Extremities: no edema, normal peripheral pulses   Neuro: Alert oriented and nonfocal      ED Course     Labs Reviewed   RAPID SARS-COV-2 BY PCR - Normal     XR CHEST PA + LAT CHEST (CPT=71046)    Result Date: 8/11/2024  CONCLUSION:  Worsening consolidation and effusion in the right lung base.  Central venous catheter tip SVC.  Normal heart size and pulmonary vascularity.  No pneumothorax.   LOCATION:  [facility]   Dictated by (CST): Arlyn Oglesby MD on 8/11/2024 at 6:30 PM     Finalized by (CST): Arlyn Oglesby MD on 8/11/2024 at 6:30 PM        I independently read the radiographs and appreciate the right pleural effusion and infiltrate.        ED COURSE and MDM     Differential diagnosis before testing included pneumonia versus severe sepsis, a medical condition that poses a threat to life.    I reviewed prior external notes including chest x-ray from 8/8/2024, 3 days ago, that showed small right-sided pleural effusion with right basilar atelectasis versus infiltrate    Patient has recently started antibiotics and so unclear whether they will be effective.  He is not sick appearing.  No fever.     Patient to continue the antibiotics.  He is a follow-up with his primary care physician or nephrologist this week.  He can return if worse.    I have discussed with the patient the results of testing, differential diagnosis, and treatment plan. They expressed clear understanding of these instructions and agrees to the  plan provided.    Disposition and Plan     Clinical Impression:  1. Community acquired pneumonia of right lower lobe of lung    2. Pleuritic chest pain         Disposition:  Discharge  8/11/2024  7:04 pm    Follow-up:  Prieto Novak MD  [address]  [facility] [address]  [phone]    Schedule an appointment as soon as possible for a visit in 3 day(s)          Medications Prescribed:  Discharge Medication List as of 8/11/2024  7:06 PM        START taking these medications    Details   predniSONE 20 MG Oral Tab Take 2 tablets (40 mg total) by mouth daily for 5 days., Normal, Disp-10 tablet, R-0      traMADol 50 MG Oral Tab Take 1-2 tablets ( mg total) by mouth every 6 (six) hours as needed for Pain., Normal, Disp-10 tablet, R-0

## 2024-08-12 NOTE — DISCHARGE INSTRUCTIONS
Take 1000 mg acetaminophen (2 Tylenol tablets) every 6 hours as needed.  May also take tramadol for severe pain

## 2024-08-30 ENCOUNTER — HOSPITAL ENCOUNTER (EMERGENCY)
Age: 44
Discharge: HOME | End: 2024-08-30
Attending: EMERGENCY MEDICINE
Payer: MEDICARE

## 2024-08-30 ENCOUNTER — APPOINTMENT (OUTPATIENT)
Dept: CT IMAGING | Age: 44
End: 2024-08-30
Attending: EMERGENCY MEDICINE
Payer: MEDICARE

## 2024-08-30 ENCOUNTER — APPOINTMENT (OUTPATIENT)
Dept: GENERAL RADIOLOGY | Age: 44
End: 2024-08-30
Attending: EMERGENCY MEDICINE
Payer: MEDICARE

## 2024-08-30 ENCOUNTER — APPOINTMENT (OUTPATIENT)
Dept: GENERAL RADIOLOGY | Facility: HOSPITAL | Age: 44
End: 2024-08-30
Attending: EMERGENCY MEDICINE
Payer: MEDICARE

## 2024-08-30 ENCOUNTER — HOSPITAL ENCOUNTER (INPATIENT)
Facility: HOSPITAL | Age: 44
LOS: 2 days | Discharge: HOME | End: 2024-09-02
Attending: EMERGENCY MEDICINE | Admitting: INTERNAL MEDICINE
Payer: MEDICARE

## 2024-08-30 VITALS
HEIGHT: 74 IN | WEIGHT: 250 LBS | HEART RATE: 70 BPM | OXYGEN SATURATION: 96 % | SYSTOLIC BLOOD PRESSURE: 141 MMHG | TEMPERATURE: 98 F | BODY MASS INDEX: 32.08 KG/M2 | RESPIRATION RATE: 20 BRPM | DIASTOLIC BLOOD PRESSURE: 81 MMHG

## 2024-08-30 DIAGNOSIS — Z99.2 END STAGE RENAL DISEASE ON DIALYSIS (HCC): ICD-10-CM

## 2024-08-30 DIAGNOSIS — N18.6 ESRD (END STAGE RENAL DISEASE) (HCC): ICD-10-CM

## 2024-08-30 DIAGNOSIS — K92.2 LOWER GI BLEED: Primary | ICD-10-CM

## 2024-08-30 DIAGNOSIS — N18.6 END STAGE RENAL DISEASE ON DIALYSIS (HCC): ICD-10-CM

## 2024-08-30 DIAGNOSIS — J96.01 ACUTE RESPIRATORY FAILURE WITH HYPOXIA (HCC): ICD-10-CM

## 2024-08-30 DIAGNOSIS — K92.2 GASTROINTESTINAL HEMORRHAGE, UNSPECIFIED GASTROINTESTINAL HEMORRHAGE TYPE: ICD-10-CM

## 2024-08-30 DIAGNOSIS — J90 PLEURAL EFFUSION: ICD-10-CM

## 2024-08-30 DIAGNOSIS — E87.70 HYPERVOLEMIA, UNSPECIFIED HYPERVOLEMIA TYPE: Primary | ICD-10-CM

## 2024-08-30 DIAGNOSIS — D64.9 ANEMIA, UNSPECIFIED TYPE: ICD-10-CM

## 2024-08-30 LAB
ALBUMIN SERPL-MCNC: 3.5 G/DL (ref 3.4–5)
ALBUMIN SERPL-MCNC: 4.3 G/DL (ref 3.2–4.8)
ALBUMIN/GLOB SERPL: 1 {RATIO} (ref 1–2)
ALBUMIN/GLOB SERPL: 1.4 {RATIO} (ref 1–2)
ALP LIVER SERPL-CCNC: 86 U/L
ALP LIVER SERPL-CCNC: 99 U/L
ALT SERPL-CCNC: 23 U/L
ALT SERPL-CCNC: 25 U/L
ANION GAP SERPL CALC-SCNC: 12 MMOL/L (ref 0–18)
ANION GAP SERPL CALC-SCNC: 6 MMOL/L (ref 0–18)
APTT PPP: 29.5 SECONDS (ref 23–36)
AST SERPL-CCNC: 17 U/L (ref 15–37)
AST SERPL-CCNC: 31 U/L (ref ?–34)
BASOPHILS # BLD AUTO: 0.07 X10(3) UL (ref 0–0.2)
BASOPHILS # BLD AUTO: 0.09 X10(3) UL (ref 0–0.2)
BASOPHILS NFR BLD AUTO: 0.8 %
BASOPHILS NFR BLD AUTO: 1.2 %
BILIRUB SERPL-MCNC: 0.3 MG/DL (ref 0.1–2)
BILIRUB SERPL-MCNC: 0.3 MG/DL (ref 0.3–1.2)
BUN BLD-MCNC: 101 MG/DL (ref 9–23)
BUN BLD-MCNC: 102 MG/DL (ref 9–23)
CALCIUM BLD-MCNC: 8 MG/DL (ref 8.5–10.1)
CALCIUM BLD-MCNC: 8.4 MG/DL (ref 8.7–10.4)
CHLORIDE SERPL-SCNC: 107 MMOL/L (ref 98–112)
CHLORIDE SERPL-SCNC: 110 MMOL/L (ref 98–112)
CO2 SERPL-SCNC: 19 MMOL/L (ref 21–32)
CO2 SERPL-SCNC: 25 MMOL/L (ref 21–32)
CREAT BLD-MCNC: 10.44 MG/DL
CREAT BLD-MCNC: 11.4 MG/DL
EGFRCR SERPLBLD CKD-EPI 2021: 5 ML/MIN/1.73M2 (ref 60–?)
EGFRCR SERPLBLD CKD-EPI 2021: 6 ML/MIN/1.73M2 (ref 60–?)
EOSINOPHIL # BLD AUTO: 0.22 X10(3) UL (ref 0–0.7)
EOSINOPHIL # BLD AUTO: 0.28 X10(3) UL (ref 0–0.7)
EOSINOPHIL NFR BLD AUTO: 2.8 %
EOSINOPHIL NFR BLD AUTO: 3.1 %
ERYTHROCYTE [DISTWIDTH] IN BLOOD BY AUTOMATED COUNT: 17.9 %
ERYTHROCYTE [DISTWIDTH] IN BLOOD BY AUTOMATED COUNT: 18 %
GLOBULIN PLAS-MCNC: 3.1 G/DL (ref 2–3.5)
GLOBULIN PLAS-MCNC: 3.6 G/DL (ref 2.8–4.4)
GLUCOSE BLD-MCNC: 102 MG/DL (ref 70–99)
GLUCOSE BLD-MCNC: 90 MG/DL (ref 70–99)
HCT VFR BLD AUTO: 25.6 %
HCT VFR BLD AUTO: 27.5 %
HGB BLD-MCNC: 8.1 G/DL
HGB BLD-MCNC: 8.7 G/DL
IMM GRANULOCYTES # BLD AUTO: 0.02 X10(3) UL (ref 0–1)
IMM GRANULOCYTES # BLD AUTO: 0.04 X10(3) UL (ref 0–1)
IMM GRANULOCYTES NFR BLD: 0.3 %
IMM GRANULOCYTES NFR BLD: 0.4 %
INR BLD: 1.05 (ref 0.8–1.2)
LIPASE SERPL-CCNC: 33 U/L (ref 12–53)
LYMPHOCYTES # BLD AUTO: 1.34 X10(3) UL (ref 1–4)
LYMPHOCYTES # BLD AUTO: 1.43 X10(3) UL (ref 1–4)
LYMPHOCYTES NFR BLD AUTO: 14.7 %
LYMPHOCYTES NFR BLD AUTO: 18.4 %
MAGNESIUM SERPL-MCNC: 2.8 MG/DL (ref 1.6–2.6)
MCH RBC QN AUTO: 32 PG (ref 26–34)
MCH RBC QN AUTO: 32.4 PG (ref 26–34)
MCHC RBC AUTO-ENTMCNC: 31.6 G/DL (ref 31–37)
MCHC RBC AUTO-ENTMCNC: 31.6 G/DL (ref 31–37)
MCV RBC AUTO: 101.1 FL
MCV RBC AUTO: 102.4 FL
MONOCYTES # BLD AUTO: 0.78 X10(3) UL (ref 0.1–1)
MONOCYTES # BLD AUTO: 0.87 X10(3) UL (ref 0.1–1)
MONOCYTES NFR BLD AUTO: 10 %
MONOCYTES NFR BLD AUTO: 9.5 %
NEUTROPHILS # BLD AUTO: 5.24 X10 (3) UL (ref 1.5–7.7)
NEUTROPHILS # BLD AUTO: 5.24 X10(3) UL (ref 1.5–7.7)
NEUTROPHILS # BLD AUTO: 6.52 X10 (3) UL (ref 1.5–7.7)
NEUTROPHILS # BLD AUTO: 6.52 X10(3) UL (ref 1.5–7.7)
NEUTROPHILS NFR BLD AUTO: 67.3 %
NEUTROPHILS NFR BLD AUTO: 71.5 %
NT-PROBNP SERPL-MCNC: ABNORMAL PG/ML (ref ?–125)
OSMOLALITY SERPL CALC.SUM OF ELEC: 318 MOSM/KG (ref 275–295)
OSMOLALITY SERPL CALC.SUM OF ELEC: 323 MOSM/KG (ref 275–295)
PLATELET # BLD AUTO: 243 10(3)UL (ref 150–450)
PLATELET # BLD AUTO: 256 10(3)UL (ref 150–450)
POTASSIUM SERPL-SCNC: 4.7 MMOL/L (ref 3.5–5.1)
POTASSIUM SERPL-SCNC: 5.6 MMOL/L (ref 3.5–5.1)
PROT SERPL-MCNC: 7.1 G/DL (ref 6.4–8.2)
PROT SERPL-MCNC: 7.4 G/DL (ref 5.7–8.2)
PROTHROMBIN TIME: 13.7 SECONDS (ref 11.6–14.8)
RBC # BLD AUTO: 2.5 X10(6)UL
RBC # BLD AUTO: 2.72 X10(6)UL
SODIUM SERPL-SCNC: 138 MMOL/L (ref 136–145)
SODIUM SERPL-SCNC: 141 MMOL/L (ref 136–145)
WBC # BLD AUTO: 7.8 X10(3) UL (ref 4–11)
WBC # BLD AUTO: 9.1 X10(3) UL (ref 4–11)

## 2024-08-30 PROCEDURE — 71045 X-RAY EXAM CHEST 1 VIEW: CPT | Performed by: EMERGENCY MEDICINE

## 2024-08-30 PROCEDURE — 93005 ELECTROCARDIOGRAM TRACING: CPT

## 2024-08-30 PROCEDURE — 86901 BLOOD TYPING SEROLOGIC RH(D): CPT | Performed by: EMERGENCY MEDICINE

## 2024-08-30 PROCEDURE — 99291 CRITICAL CARE FIRST HOUR: CPT

## 2024-08-30 PROCEDURE — 86900 BLOOD TYPING SEROLOGIC ABO: CPT | Performed by: EMERGENCY MEDICINE

## 2024-08-30 PROCEDURE — 80053 COMPREHEN METABOLIC PANEL: CPT | Performed by: EMERGENCY MEDICINE

## 2024-08-30 PROCEDURE — 86850 RBC ANTIBODY SCREEN: CPT | Performed by: EMERGENCY MEDICINE

## 2024-08-30 PROCEDURE — 99285 EMERGENCY DEPT VISIT HI MDM: CPT

## 2024-08-30 PROCEDURE — 96374 THER/PROPH/DIAG INJ IV PUSH: CPT

## 2024-08-30 PROCEDURE — 83690 ASSAY OF LIPASE: CPT | Performed by: EMERGENCY MEDICINE

## 2024-08-30 PROCEDURE — 85610 PROTHROMBIN TIME: CPT | Performed by: EMERGENCY MEDICINE

## 2024-08-30 PROCEDURE — 83735 ASSAY OF MAGNESIUM: CPT | Performed by: EMERGENCY MEDICINE

## 2024-08-30 PROCEDURE — 93010 ELECTROCARDIOGRAM REPORT: CPT

## 2024-08-30 PROCEDURE — 85025 COMPLETE CBC W/AUTO DIFF WBC: CPT | Performed by: EMERGENCY MEDICINE

## 2024-08-30 PROCEDURE — 96375 TX/PRO/DX INJ NEW DRUG ADDON: CPT

## 2024-08-30 PROCEDURE — 96376 TX/PRO/DX INJ SAME DRUG ADON: CPT

## 2024-08-30 PROCEDURE — 82272 OCCULT BLD FECES 1-3 TESTS: CPT

## 2024-08-30 PROCEDURE — 85730 THROMBOPLASTIN TIME PARTIAL: CPT | Performed by: EMERGENCY MEDICINE

## 2024-08-30 PROCEDURE — 94002 VENT MGMT INPAT INIT DAY: CPT

## 2024-08-30 PROCEDURE — 83880 ASSAY OF NATRIURETIC PEPTIDE: CPT | Performed by: EMERGENCY MEDICINE

## 2024-08-30 PROCEDURE — 74176 CT ABD & PELVIS W/O CONTRAST: CPT | Performed by: EMERGENCY MEDICINE

## 2024-08-30 RX ORDER — HYDROMORPHONE HYDROCHLORIDE 1 MG/ML
0.5 INJECTION, SOLUTION INTRAMUSCULAR; INTRAVENOUS; SUBCUTANEOUS ONCE
Status: COMPLETED | OUTPATIENT
Start: 2024-08-30 | End: 2024-08-30

## 2024-08-30 RX ORDER — HYDROMORPHONE HYDROCHLORIDE 1 MG/ML
1 INJECTION, SOLUTION INTRAMUSCULAR; INTRAVENOUS; SUBCUTANEOUS ONCE
Status: COMPLETED | OUTPATIENT
Start: 2024-08-30 | End: 2024-08-30

## 2024-08-30 RX ORDER — HYDROMORPHONE HYDROCHLORIDE 1 MG/ML
0.5 INJECTION, SOLUTION INTRAMUSCULAR; INTRAVENOUS; SUBCUTANEOUS ONCE
Status: COMPLETED | OUTPATIENT
Start: 2024-08-30 | End: 2024-08-31

## 2024-08-30 RX ORDER — FUROSEMIDE 10 MG/ML
40 INJECTION INTRAMUSCULAR; INTRAVENOUS ONCE
Status: COMPLETED | OUTPATIENT
Start: 2024-08-30 | End: 2024-08-30

## 2024-08-30 NOTE — ED PROVIDER NOTES
Patient Seen in: [facility] In [place]      History     Chief Complaint   Patient presents with    GI Bleeding     Stated Complaint: rectal bleeding, SOB    Subjective:   HPI  Patient is a 45 yo M with a history of anemia, ESRD on HD MWF, recurrent rectal bleeding, who presents to ED for evaluation of GI bleeding and SOB.  Patient recently underwent EGD and colonsocopy at OSH by Dr. Gallardo and was recommended outpatient capsule endoscopy of small bowel.  Reports that this morning he had 1 episode of bright red blood per rectum with a large amount of blood in the toilet.  He also has some right sided crampy abdominal pain in the right side of his abdomen.  Denies any fevers, chills. No vomiting. Patient's last dialysis was on Wednesday.  Patient is not on any blood thinners. He states he has had intermittent rectal bleeding for 2 years with unclear source. He has had similar R sided crampy abdominal pain with the bleeding. He also has some mild SOB. Patient denies any frequent NSAID use. Denies any alcohol use.     From chart review, patient left AMA on Wednesday from OSH where he was admitted for anemia 2/2 GI bleed.  He states that he left AMA because he was not going to get dialysis until  the afternoon as inpatient so he left for dialysis appointment as outpatient and for another appointment.  Patient was cleared from a GI perspective for outpatient f/u with capsule endoscopy prior to leaving AMA.     Objective:   Past Medical History:    Asthma (HCC)    Attention deficit hyperactivity disorder (ADHD)    Back problem    Bipolar 1 disorder (HCC)    CKD (chronic kidney disease) stage 3, GFR 30-59 ml/min ([facility])    Dr Meeks    Congestive heart disease (HCC)    COPD (chronic obstructive pulmonary disease) ([facility])    Coronary atherosclerosis    Deep vein thrombosis ([facility])    at age 17 R/T cast    Depression    Diabetes ([facility])    Essential hypertension    3/21 echo: severe concentric LVH with normal EF  and no MR or pHTN    Extrinsic asthma, unspecified    Heart attack (HCC)    2016- angiogram- no intervention    Heart valve disease    mitral valve repair in 1994/    High blood pressure    High cholesterol    History of mitral valve repair    Hyperlipidemia    Low back pain    tight and stiff after sweeping and mopping    LVH (left ventricular hypertrophy)    Migraines    Mixed hyperlipidemia     HDL 38 LDL 97 VLDL 57     Monoclonal gammopathy    IgG kappa     MVP (mitral valve prolapse)    Repair 1994 at [place]; echoes as recently as 3/21 show mild or trivial MR and no stenosis    Neuropathy    Osteoarthritis    hip ,knees    Pneumonia due to organism    Pulmonary embolism (HCC)    Renal disorder    Stroke (HCC)    TIA (transient ischemic attack)    Initial history of left-sided weakness and slurred speech. (+) cocaine. MRI of the brain, CT angiogram of the head and neck, and 2D echo are all unremarkable.     TMJ (dislocation of temporomandibular joint)    Troponin level elevated    Trop 60 60 47 with TIA and no CP: Lexiscan negative with EF 51              Past Surgical History:   Procedure Laterality Date    Av fistula revision, open Left     Colonoscopy N/A 03/26/2023    Procedure: COLONOSCOPY;  Surgeon: Heath Vu MD;  Location:  ENDOSCOPY    Colonoscopy N/A 12/30/2023    Procedure: COLONOSCOPY with cold snare polypectomy and forcep polypectomy;  Surgeon: Ousmane Suarez MD;  Location:  ENDOSCOPY    Colonoscopy & polypectomy  2019    Egd  2019    Duodenitis. Biopsied. EUS for weight loss was negative    Heart surgery      Hernia surgery  08/17/2022    Dr Barnes    Laminectomy,>2 sgmt,lumbar  09/06/2018    L4-L5 Decomp Discectomy [facility] L4-L5    Mitralplasty w cp bypass  1994    [place]: Repair    Repair rotator cuff,chronic Left     torn and had a ruptured bicep    Valve repair  1994    mitral valve                Social History     Socioeconomic History    Marital status:    Tobacco  Use    Smoking status: Former     Current packs/day: 0.00     Average packs/day: 1 pack/day for 27.0 years (27.0 ttl pk-yrs)     Types: Cigarettes     Start date: 1995     Quit date: 2022     Years since quittin.5     Passive exposure: Never    Smokeless tobacco: Never   Vaping Use    Vaping status: Never Used   Substance and Sexual Activity    Alcohol use: No    Drug use: No     Social Determinants of Health     Financial Resource Strain: Medium Risk (2024)    Received from [facility]    Overall Financial Resource Strain (CARDIA)     Difficulty of Paying Living Expenses: Somewhat hard   Food Insecurity: Low Risk  (2024)    Received from [facility]     Within the past 12 months, the food you bought just didn't last and you didn't have money to get more.: 3     Within the past 12 months, you worried that your food would run out before you got money to buy more.: 3   Transportation Needs: Not At Risk (2024)    Received from [facility] Transportation Needs     In the past 12 months, has lack of reliable transportation kept you from medical appointments, meetings, work or from getting things needed for daily living?: No   Physical Activity: Inactive (2024)    Received from [facility]    Exercise Vital Sign     Days of Exercise per Week: 0 days     Minutes of Exercise per Session: 0 min   Stress: Stress Concern Present (2024)    Received from [facility]    [employer] [place] [employer] - Occupational Stress Questionnaire     Feeling of Stress : Rather much   Social Connections: Unknown (2024)    Received from [facility]    Social Connection and Isolation Panel [NHANES]     Frequency of Communication with Friends and Family: More than three times a week     Frequency of Social Gatherings with Friends and Family: More than three times a week     Attends Anabaptist Services: Never     Active Member of  Clubs or Organizations: No     Attends Club or Organization Meetings: Never     Marital Status: Patient unable to answer   Housing Stability: Not At Risk (7/30/2024)    Received from [facility]     What is your living situation today?: I have a steady place to live     Think about the place you live. Do you have problems with any of the following?: None of the above              Review of Systems    Positive for stated Chief Complaint: GI Bleeding    Other systems are as noted in HPI.  Constitutional and vital signs reviewed.      All other systems reviewed and negative except as noted above.    Physical Exam     ED Triage Vitals   BP 08/30/24 0829 118/77   Pulse 08/30/24 0829 69   Resp 08/30/24 0829 (!) 28   Temp 08/30/24 0829 97.8 °F (36.6 °C)   Temp src --    SpO2 08/30/24 0829 96 %   O2 Device 08/30/24 0950 None (Room air)       Current Vitals:   Vital Signs  BP: 141/81  Pulse: 70  Resp: 20  Temp: 97.8 °F (36.6 °C)    Oxygen Therapy  SpO2: 96 %  O2 Device: None (Room air)            Physical Exam  Vitals and nursing note reviewed.   Constitutional:       General: He is not in acute distress.  HENT:      Head: Normocephalic and atraumatic.      Nose: Nose normal.      Mouth/Throat:      Mouth: Mucous membranes are moist.   Eyes:      Extraocular Movements: Extraocular movements intact.      Pupils: Pupils are equal, round, and reactive to light.   Cardiovascular:      Rate and Rhythm: Normal rate and regular rhythm.      Pulses: Normal pulses.   Pulmonary:      Effort: Pulmonary effort is normal. No respiratory distress.      Breath sounds: No wheezing.   Abdominal:      General: There is no distension.      Palpations: Abdomen is soft.      Tenderness: There is abdominal tenderness. There is no guarding.      Comments: TTP of R side of abdomen (RUQ/RLQ)   Genitourinary:     Comments: No significant blood in rectal vault. Clear, hemoccult negative stool  No hemorrhoids or  fissures  Musculoskeletal:         General: No swelling. Normal range of motion.      Cervical back: Normal range of motion.   Skin:     General: Skin is warm and dry.      Capillary Refill: Capillary refill takes less than 2 seconds.   Neurological:      General: No focal deficit present.      Mental Status: He is alert and oriented to person, place, and time.   Psychiatric:         Mood and Affect: Mood normal.             ED Course     Labs Reviewed   CBC WITH DIFFERENTIAL WITH PLATELET - Abnormal; Notable for the following components:       Result Value    RBC 2.50 (*)     HGB 8.1 (*)     HCT 25.6 (*)     .4 (*)     All other components within normal limits   COMP METABOLIC PANEL (14) - Abnormal; Notable for the following components:    Glucose 102 (*)     CO2 19.0 (*)      (*)     Creatinine 11.40 (*)     Calcium, Total 8.0 (*)     Calculated Osmolality 318 (*)     eGFR-Cr 5 (*)     All other components within normal limits   MAGNESIUM - Abnormal; Notable for the following components:    Magnesium 2.8 (*)     All other components within normal limits   PRO BETA NATRIURETIC PEPTIDE - Abnormal; Notable for the following components:    Pro-Beta Natriuretic Peptide 24,072 (*)     All other components within normal limits   PTT, ACTIVATED - Normal   PROTHROMBIN TIME (PT) - Normal   LIPASE - Normal   RAINBOW DRAW LAVENDER   RAINBOW DRAW LIGHT GREEN   RAINBOW DRAW BLUE            MDM      Patient is a 43 yo M with a history of anemia, ESRD on HD MWF, recurrent rectal bleeding, who presents to ED for evaluation of GI bleeding and SOB.  Differential is broad and includes but not limited to hemorrhoids, diverticular bleeding, fissure, upper GI bleed.     Patient is afebrile, hemodynamically stable and satting well room air.  On exam patient is nontoxic-appearing.  He has right sided upper and lower abdominal tenderness but no peritoneal signs. No obvious hemorrhoids or anal fissures on exam. Will plan to  obtain labs, CT abdomen/pelvis, CXR. KG is nonischemic.  Patient requesting pain medications as well.     ED Course:   - CBC shows hgb of 8.1 (stable from 8.6 on Wednesday prior to DC). Plts wnl, no leukocytosis. CMP c/w ESRD. Potassium 4.7. Lipase wnl  - CT abd/pelvis shows no acute intra-abd pathology.   - Discussed with [facility] on call. Patient was cleared from GI perspective during recent admission.  [facility] on call recommended outpatient f/u with Dr. Gallardo for further work up with capsule endoscopy given hgb is stable from recent labs and patient is hemodynamically stable, not on any blood thinners and has not had any further episodes of bleeding in ED.   - CXR shows mild interstitial edema. Pt is satting well on RA. No resp distress. Discussed with nephro. If pt can arrange for outpatient HD tomorrow pt is ok to be discharged. Pt called HD center to arrange for appt tomorrow prior to DC.   - Pt has small R sided pleural effusion on CXR seen on prior, which was treated with antibiotics. Pt states antibiotics did not help and he still has cough. Denies any fevers or change to cough. Pt is afebrile with no leukocytosis. Provided with pulm for f/u for this as well.     Patient provided with strict return precautions. Plan for outpatient f/u. Patient verbalized understanding and agreement of plan.         Medical Decision Making      Disposition and Plan     Clinical Impression:  1. Lower GI bleed    2. ESRD (end stage renal disease) (HCC)    3. Anemia, unspecified type    4. Pleural effusion         Disposition:  Discharge  8/30/2024  1:46 pm    Follow-up:  Prieto Novak MD  [address]  [address]  [facility] [address] [phone]    Schedule an appointment as soon as possible for a visit in 1 day(s)      Follow up with Dr. Gallardo.    Schedule an appointment as soon as possible for a visit      Heath Vu MD  [address]  [address]  [facility] [address] [phone]    Schedule an appointment as  soon as possible for a visit  As needed    Alejandro Donis MD  [address]   [address]  [facility] [address]  [phone]    Schedule an appointment as soon as possible for a visit            Medications Prescribed:  Discharge Medication List as of 8/30/2024  1:47 PM

## 2024-08-30 NOTE — DISCHARGE INSTRUCTIONS
You were seen in the emergency department for a lower GI bleed. Go to your appointment tomorrow for your dialysis.  Return to the emergency department if develop severe weakness, chest pain or shortness of breath, increased bleeding, or any new concerns or worsening symptoms symptoms.  Please up closely with your primary care physician as well.    You also have a small pleural effusion on the right side. Please follow up with a pulmonologist or your primary care physician for this as well. Return to the ER if you develop fevers, worsening cough, or difficulty breathing.

## 2024-08-31 PROBLEM — J96.01 ACUTE RESPIRATORY FAILURE WITH HYPOXIA (HCC): Status: ACTIVE | Noted: 2024-08-31

## 2024-08-31 PROBLEM — N18.6 END STAGE RENAL DISEASE ON DIALYSIS (HCC): Status: ACTIVE | Noted: 2024-08-31

## 2024-08-31 PROBLEM — Z99.2 END STAGE RENAL DISEASE ON DIALYSIS (HCC): Status: ACTIVE | Noted: 2024-08-31

## 2024-08-31 LAB
ANION GAP SERPL CALC-SCNC: 12 MMOL/L (ref 0–18)
ANTIBODY SCREEN: NEGATIVE
ATRIAL RATE: 68 BPM
BUN BLD-MCNC: 67 MG/DL (ref 9–23)
CALCIUM BLD-MCNC: 8.7 MG/DL (ref 8.7–10.4)
CHLORIDE SERPL-SCNC: 106 MMOL/L (ref 98–112)
CO2 SERPL-SCNC: 24 MMOL/L (ref 21–32)
CREAT BLD-MCNC: 6.77 MG/DL
EGFRCR SERPLBLD CKD-EPI 2021: 9 ML/MIN/1.73M2 (ref 60–?)
ERYTHROCYTE [DISTWIDTH] IN BLOOD BY AUTOMATED COUNT: 17.5 %
GLUCOSE BLD-MCNC: 107 MG/DL (ref 70–99)
GLUCOSE BLD-MCNC: 82 MG/DL (ref 70–99)
GLUCOSE BLD-MCNC: 98 MG/DL (ref 70–99)
HCT VFR BLD AUTO: 28.3 %
HGB BLD-MCNC: 9.3 G/DL
MCH RBC QN AUTO: 33.2 PG (ref 26–34)
MCHC RBC AUTO-ENTMCNC: 32.9 G/DL (ref 31–37)
MCV RBC AUTO: 101.1 FL
OSMOLALITY SERPL CALC.SUM OF ELEC: 312 MOSM/KG (ref 275–295)
P AXIS: 3 DEGREES
P-R INTERVAL: 206 MS
PLATELET # BLD AUTO: 261 10(3)UL (ref 150–450)
POTASSIUM SERPL-SCNC: 4 MMOL/L (ref 3.5–5.1)
Q-T INTERVAL: 452 MS
QRS DURATION: 106 MS
QTC CALCULATION (BEZET): 480 MS
R AXIS: 26 DEGREES
RBC # BLD AUTO: 2.8 X10(6)UL
RH BLOOD TYPE: POSITIVE
SODIUM SERPL-SCNC: 142 MMOL/L (ref 136–145)
T AXIS: 83 DEGREES
VENTRICULAR RATE: 68 BPM
WBC # BLD AUTO: 11.2 X10(3) UL (ref 4–11)

## 2024-08-31 PROCEDURE — 85027 COMPLETE CBC AUTOMATED: CPT | Performed by: INTERNAL MEDICINE

## 2024-08-31 PROCEDURE — 90935 HEMODIALYSIS ONE EVALUATION: CPT | Performed by: INTERNAL MEDICINE

## 2024-08-31 PROCEDURE — 80048 BASIC METABOLIC PNL TOTAL CA: CPT | Performed by: INTERNAL MEDICINE

## 2024-08-31 PROCEDURE — 82962 GLUCOSE BLOOD TEST: CPT

## 2024-08-31 PROCEDURE — 94640 AIRWAY INHALATION TREATMENT: CPT

## 2024-08-31 PROCEDURE — 94003 VENT MGMT INPAT SUBQ DAY: CPT

## 2024-08-31 PROCEDURE — 5A09357 ASSISTANCE WITH RESPIRATORY VENTILATION, LESS THAN 24 CONSECUTIVE HOURS, CONTINUOUS POSITIVE AIRWAY PRESSURE: ICD-10-PCS | Performed by: INTERNAL MEDICINE

## 2024-08-31 RX ORDER — DEXTROAMPHETAMINE SACCHARATE, AMPHETAMINE ASPARTATE, DEXTROAMPHETAMINE SULFATE AND AMPHETAMINE SULFATE 1.25; 1.25; 1.25; 1.25 MG/1; MG/1; MG/1; MG/1
10 TABLET ORAL
Status: DISCONTINUED | OUTPATIENT
Start: 2024-08-31 | End: 2024-09-02

## 2024-08-31 RX ORDER — MELATONIN
3 NIGHTLY PRN
Status: DISCONTINUED | OUTPATIENT
Start: 2024-08-31 | End: 2024-09-02

## 2024-08-31 RX ORDER — HYDROMORPHONE HYDROCHLORIDE 1 MG/ML
0.2 INJECTION, SOLUTION INTRAMUSCULAR; INTRAVENOUS; SUBCUTANEOUS EVERY 6 HOURS PRN
Status: DISCONTINUED | OUTPATIENT
Start: 2024-08-31 | End: 2024-09-02

## 2024-08-31 RX ORDER — ALBUMIN (HUMAN) 12.5 G/50ML
25 SOLUTION INTRAVENOUS
Status: ACTIVE | OUTPATIENT
Start: 2024-08-31 | End: 2024-09-02

## 2024-08-31 RX ORDER — HYDROCORTISONE ACETATE 25 MG/1
25 SUPPOSITORY RECTAL 2 TIMES DAILY
Status: DISCONTINUED | OUTPATIENT
Start: 2024-08-31 | End: 2024-09-02

## 2024-08-31 RX ORDER — ACETAMINOPHEN 500 MG
500 TABLET ORAL EVERY 4 HOURS PRN
Status: DISCONTINUED | OUTPATIENT
Start: 2024-08-31 | End: 2024-09-02

## 2024-08-31 RX ORDER — HYDROMORPHONE HYDROCHLORIDE 1 MG/ML
0.1 INJECTION, SOLUTION INTRAMUSCULAR; INTRAVENOUS; SUBCUTANEOUS EVERY 2 HOUR PRN
Status: DISCONTINUED | OUTPATIENT
Start: 2024-08-31 | End: 2024-08-31

## 2024-08-31 RX ORDER — CLONIDINE HYDROCHLORIDE 0.1 MG/1
0.1 TABLET ORAL
Status: DISCONTINUED | OUTPATIENT
Start: 2024-08-31 | End: 2024-08-31

## 2024-08-31 RX ORDER — SEVELAMER CARBONATE 800 MG/1
800 TABLET, FILM COATED ORAL
Status: DISCONTINUED | OUTPATIENT
Start: 2024-08-31 | End: 2024-09-01

## 2024-08-31 RX ORDER — HYDROMORPHONE HYDROCHLORIDE 1 MG/ML
0.1 INJECTION, SOLUTION INTRAMUSCULAR; INTRAVENOUS; SUBCUTANEOUS EVERY 6 HOURS PRN
Status: DISCONTINUED | OUTPATIENT
Start: 2024-08-31 | End: 2024-09-02

## 2024-08-31 RX ORDER — NICOTINE POLACRILEX 4 MG
30 LOZENGE BUCCAL
Status: DISCONTINUED | OUTPATIENT
Start: 2024-08-31 | End: 2024-09-02

## 2024-08-31 RX ORDER — HYDROMORPHONE HYDROCHLORIDE 1 MG/ML
0.4 INJECTION, SOLUTION INTRAMUSCULAR; INTRAVENOUS; SUBCUTANEOUS EVERY 2 HOUR PRN
Status: DISCONTINUED | OUTPATIENT
Start: 2024-08-31 | End: 2024-08-31

## 2024-08-31 RX ORDER — ARIPIPRAZOLE 5 MG/1
5 TABLET ORAL DAILY
Status: DISCONTINUED | OUTPATIENT
Start: 2024-08-31 | End: 2024-09-02

## 2024-08-31 RX ORDER — HYDROCODONE BITARTRATE AND ACETAMINOPHEN 5; 325 MG/1; MG/1
1 TABLET ORAL EVERY 4 HOURS PRN
Status: DISCONTINUED | OUTPATIENT
Start: 2024-08-31 | End: 2024-09-02

## 2024-08-31 RX ORDER — PROCHLORPERAZINE EDISYLATE 5 MG/ML
5 INJECTION INTRAMUSCULAR; INTRAVENOUS EVERY 8 HOURS PRN
Status: DISCONTINUED | OUTPATIENT
Start: 2024-08-31 | End: 2024-09-02

## 2024-08-31 RX ORDER — LOSARTAN POTASSIUM 100 MG/1
100 TABLET ORAL DAILY
Status: DISCONTINUED | OUTPATIENT
Start: 2024-08-31 | End: 2024-09-02

## 2024-08-31 RX ORDER — HYDROCODONE BITARTRATE AND ACETAMINOPHEN 5; 325 MG/1; MG/1
2 TABLET ORAL EVERY 4 HOURS PRN
Status: DISCONTINUED | OUTPATIENT
Start: 2024-08-31 | End: 2024-09-02

## 2024-08-31 RX ORDER — FENOFIBRATE 134 MG/1
134 CAPSULE ORAL NIGHTLY
Status: DISCONTINUED | OUTPATIENT
Start: 2024-08-31 | End: 2024-09-02

## 2024-08-31 RX ORDER — CLONIDINE HYDROCHLORIDE 0.1 MG/1
0.1 TABLET ORAL 2 TIMES DAILY
Status: DISCONTINUED | OUTPATIENT
Start: 2024-08-31 | End: 2024-09-02

## 2024-08-31 RX ORDER — NICOTINE POLACRILEX 4 MG
15 LOZENGE BUCCAL
Status: DISCONTINUED | OUTPATIENT
Start: 2024-08-31 | End: 2024-09-02

## 2024-08-31 RX ORDER — CARVEDILOL 12.5 MG/1
25 TABLET ORAL 2 TIMES DAILY WITH MEALS
Status: DISCONTINUED | OUTPATIENT
Start: 2024-08-31 | End: 2024-09-02

## 2024-08-31 RX ORDER — ACETAMINOPHEN 325 MG/1
650 TABLET ORAL EVERY 4 HOURS PRN
Status: DISCONTINUED | OUTPATIENT
Start: 2024-08-31 | End: 2024-09-02

## 2024-08-31 RX ORDER — HYDROCORTISONE ACETATE 25 MG/1
25 SUPPOSITORY RECTAL 2 TIMES DAILY
Qty: 60 SUPPOSITORY | Refills: 0 | Status: SHIPPED | OUTPATIENT
Start: 2024-08-31

## 2024-08-31 RX ORDER — BUPROPION HYDROCHLORIDE 150 MG/1
150 TABLET ORAL DAILY
Status: DISCONTINUED | OUTPATIENT
Start: 2024-08-31 | End: 2024-09-02

## 2024-08-31 RX ORDER — HEPARIN SODIUM 5000 [USP'U]/ML
5000 INJECTION, SOLUTION INTRAVENOUS; SUBCUTANEOUS EVERY 8 HOURS SCHEDULED
Status: DISCONTINUED | OUTPATIENT
Start: 2024-08-31 | End: 2024-09-02

## 2024-08-31 RX ORDER — NIFEDIPINE 60 MG/1
60 TABLET, EXTENDED RELEASE ORAL 2 TIMES DAILY
Status: DISCONTINUED | OUTPATIENT
Start: 2024-08-31 | End: 2024-09-02

## 2024-08-31 RX ORDER — LAMOTRIGINE 150 MG/1
150 TABLET ORAL DAILY
Status: DISCONTINUED | OUTPATIENT
Start: 2024-08-31 | End: 2024-09-02

## 2024-08-31 RX ORDER — IPRATROPIUM BROMIDE AND ALBUTEROL SULFATE 2.5; .5 MG/3ML; MG/3ML
3 SOLUTION RESPIRATORY (INHALATION) EVERY 6 HOURS PRN
Status: DISCONTINUED | OUTPATIENT
Start: 2024-08-31 | End: 2024-09-02

## 2024-08-31 RX ORDER — DEXTROSE MONOHYDRATE 25 G/50ML
50 INJECTION, SOLUTION INTRAVENOUS
Status: DISCONTINUED | OUTPATIENT
Start: 2024-08-31 | End: 2024-09-02

## 2024-08-31 RX ORDER — HYDROMORPHONE HYDROCHLORIDE 1 MG/ML
0.4 INJECTION, SOLUTION INTRAMUSCULAR; INTRAVENOUS; SUBCUTANEOUS EVERY 6 HOURS PRN
Status: DISCONTINUED | OUTPATIENT
Start: 2024-08-31 | End: 2024-09-02

## 2024-08-31 RX ORDER — ISOSORBIDE MONONITRATE 30 MG/1
30 TABLET, EXTENDED RELEASE ORAL DAILY
Status: DISCONTINUED | OUTPATIENT
Start: 2024-08-31 | End: 2024-09-02

## 2024-08-31 RX ORDER — ONDANSETRON 2 MG/ML
4 INJECTION INTRAMUSCULAR; INTRAVENOUS EVERY 6 HOURS PRN
Status: DISCONTINUED | OUTPATIENT
Start: 2024-08-31 | End: 2024-09-02

## 2024-08-31 RX ORDER — HYDROMORPHONE HYDROCHLORIDE 1 MG/ML
0.2 INJECTION, SOLUTION INTRAMUSCULAR; INTRAVENOUS; SUBCUTANEOUS EVERY 2 HOUR PRN
Status: DISCONTINUED | OUTPATIENT
Start: 2024-08-31 | End: 2024-08-31

## 2024-08-31 NOTE — ED PROVIDER NOTES
Patient Seen in: [facility] Emergency Department      History     Chief Complaint   Patient presents with    GI Bleeding    Difficulty Breathing     Missed Friday dialysis  feels  overloaded     Stated Complaint: rectal bleeding and chest pain    Subjective:   HPI    Patient is a 44-year-old male presenting to the ED stating that he is having continued GI bleeding as well as \"fluid overload.\"  The patient was seen earlier today at [place] ED for an episode of GI bleeding with a hemoglobin of 8.1.  The patient had 1 other episode this afternoon prompting him to return to the ED for repeat evaluation.  He does feel short of breath.  He missed his dialysis today because he went to the ER.  He typically receives in on Monday, Wednesday, Friday.  He completed it last on Wednesday.  He is not typically on any oxygen.  No complaints of chest pain.  He has had some right sided abdominal pain with his symptoms which was present earlier today.  He did have a CT scan of the abdomen and pelvis performed at that time.  No fevers or chills.  No new cough or URI symptoms.  He does make urine, about 2 or 3 episodes per day.  No anticoagulation.  Patient is able to show me to photos of his bloody stool in the toilet.  There is blood in the toilet water with what appears to be brown or dark-colored stool present.    Objective:   Past Medical History:    Asthma ([facility])    Attention deficit hyperactivity disorder (ADHD)    Back problem    Bipolar 1 disorder ([facility])    CKD (chronic kidney disease) stage 3, GFR 30-59 ml/min ([facility])    Dr Meeks    Congestive heart disease (HCC)    COPD (chronic obstructive pulmonary disease) ([facility])    Coronary atherosclerosis    Deep vein thrombosis ([facility])    at age 17 R/T cast    Depression    Diabetes ([facility])    Essential hypertension    3/21 echo: severe concentric LVH with normal EF and no MR or pHTN    Extrinsic asthma, unspecified    Heart attack ([facility])    2016- angiogram- no intervention    Heart  valve disease    mitral valve repair in 1994/    High blood pressure    High cholesterol    History of mitral valve repair    Hyperlipidemia    Low back pain    tight and stiff after sweeping and mopping    LVH (left ventricular hypertrophy)    Migraines    Mixed hyperlipidemia     HDL 38 LDL 97 VLDL 57     Monoclonal gammopathy    IgG kappa     MVP (mitral valve prolapse)    Repair 1994 at [place]; echoes as recently as 3/21 show mild or trivial MR and no stenosis    Neuropathy    Osteoarthritis    hip ,knees    Pneumonia due to organism    Pulmonary embolism (HCC)    Renal disorder    Stroke (HCC)    TIA (transient ischemic attack)    Initial history of left-sided weakness and slurred speech. (+) cocaine. MRI of the brain, CT angiogram of the head and neck, and 2D echo are all unremarkable.     TMJ (dislocation of temporomandibular joint)    Troponin level elevated    Trop 60 60 47 with TIA and no CP: Lexiscan negative with EF 51              Past Surgical History:   Procedure Laterality Date    Av fistula revision, open Left     Colonoscopy N/A 03/26/2023    Procedure: COLONOSCOPY;  Surgeon: Heath Vu MD;  Location:  ENDOSCOPY    Colonoscopy N/A 12/30/2023    Procedure: COLONOSCOPY with cold snare polypectomy and forcep polypectomy;  Surgeon: Ousmane Suarez MD;  Location:  ENDOSCOPY    Colonoscopy & polypectomy  2019    Egd  2019    Duodenitis. Biopsied. EUS for weight loss was negative    Heart surgery      Hernia surgery  08/17/2022    Dr Barnes    Laminectomy,>2 sgmt,lumbar  09/06/2018    L4-L5 Decomp Discectomy ROEM L4-L5    Mitralplasty w cp bypass  1994    [place]: Repair    Repair rotator cuff,chronic Left     torn and had a ruptured bicep    Valve repair  1994    mitral valve                Social History     Socioeconomic History    Marital status:    Tobacco Use    Smoking status: Former     Current packs/day: 0.00     Average packs/day: 1 pack/day for 27.0 years (27.0 ttl  pk-yrs)     Types: Cigarettes     Start date: 1995     Quit date: 2022     Years since quittin.5     Passive exposure: Never    Smokeless tobacco: Never   Vaping Use    Vaping status: Never Used   Substance and Sexual Activity    Alcohol use: No    Drug use: No     Social Determinants of Health     Financial Resource Strain: Medium Risk (2024)    Received from [facility]    Overall Financial Resource Strain (CARDIA)     Difficulty of Paying Living Expenses: Somewhat hard   Food Insecurity: Low Risk  (2024)    Received from [facility] Food Security     Within the past 12 months, the food you bought just didn't last and you didn't have money to get more.: 3     Within the past 12 months, you worried that your food would run out before you got money to buy more.: 3   Transportation Needs: Not At Risk (2024)    Received from [facility] Transportation Needs     In the past 12 months, has lack of reliable transportation kept you from medical appointments, meetings, work or from getting things needed for daily living?: No   Physical Activity: Inactive (2024)    Received from [facility]    Exercise Vital Sign     Days of Exercise per Week: 0 days     Minutes of Exercise per Session: 0 min   Stress: Stress Concern Present (2024)    Received from [facility]    [employer] [place] [employer] - Occupational Stress Questionnaire     Feeling of Stress : Rather much   Social Connections: Unknown (2024)    Received from [facility]    Social Connection and Isolation Panel [NHANES]     Frequency of Communication with Friends and Family: More than three times a week     Frequency of Social Gatherings with Friends and Family: More than three times a week     Attends Yarsani Services: Never     Active Member of Clubs or Organizations: No     Attends Club or Organization Meetings: Never     Marital Status: Patient unable to  answer   Housing Stability: Not At Risk (7/30/2024)    Received from [facility]     What is your living situation today?: I have a steady place to live     Think about the place you live. Do you have problems with any of the following?: None of the above              Review of Systems    Positive for stated Chief Complaint: GI Bleeding and Difficulty Breathing (Missed Friday dialysis  feels  overloaded)    Other systems are as noted in HPI.  Constitutional and vital signs reviewed.      All other systems reviewed and negative except as noted above.    Physical Exam     ED Triage Vitals [08/30/24 2032]   /83   Pulse 78   Resp 24   Temp 97.8 °F (36.6 °C)   Temp src Temporal   SpO2 96 %   O2 Device None (Room air)       Current Vitals:   Vital Signs  BP: (!) 151/103  Pulse: 64  Resp: 25  Temp: 97.8 °F (36.6 °C)  Temp src: Temporal  MAP (mmHg): (!) 112    Oxygen Therapy  SpO2: 96 %  O2 Device: Bi-PAP  Mode: Spontaneous/Timed            Physical Exam  Vitals and nursing note reviewed.   Constitutional:       General: He is in acute distress.      Appearance: He is well-developed. He is ill-appearing.      Comments: Patient appears tachypneic and short of breath.  O2 saturation 88 to 99% on room air.  Initially placed on O2 nasal cannula however BiPAP was ordered as patient was breathing through his mouth and remained at approximately 89 to 90%.   HENT:      Head: Normocephalic and atraumatic.   Eyes:      Comments: Normal conjunctiva   Cardiovascular:      Rate and Rhythm: Normal rate and regular rhythm.   Pulmonary:      Effort: Respiratory distress present.      Breath sounds: Rales present. No rhonchi.   Abdominal:      General: Abdomen is flat. Bowel sounds are normal. There is no distension.      Palpations: Abdomen is soft.      Tenderness: There is abdominal tenderness. There is no guarding.      Comments: Minimal tenderness right upper and lower quadrant.   Musculoskeletal:         General:  Normal range of motion.      Right lower leg: No edema.      Left lower leg: No edema.   Skin:     General: Skin is warm and dry.      Capillary Refill: Capillary refill takes less than 2 seconds.      Findings: No rash.   Neurological:      General: No focal deficit present.      Mental Status: He is alert and oriented to person, place, and time.   Psychiatric:         Mood and Affect: Mood normal.         Behavior: Behavior normal.               ED Course     Labs Reviewed   COMP METABOLIC PANEL (14) - Abnormal; Notable for the following components:       Result Value    Potassium 5.6 (*)      (*)     Creatinine 10.44 (*)     Calcium, Total 8.4 (*)     Calculated Osmolality 323 (*)     eGFR-Cr 6 (*)     All other components within normal limits   CBC WITH DIFFERENTIAL WITH PLATELET - Abnormal; Notable for the following components:    RBC 2.72 (*)     HGB 8.7 (*)     HCT 27.5 (*)     .1 (*)     All other components within normal limits   TYPE AND SCREEN    Narrative:     The following orders were created for panel order Type and screen.  Procedure                               Abnormality         Status                     ---------                               -----------         ------                     ABORH (Blood Type)[[id number]]                               Final result               Antibody Screen[[id number]]                                  Final result                 Please view results for these tests on the individual orders.   ABORH (BLOOD TYPE)   ANTIBODY SCREEN     EKG    Rate, intervals and axes as noted on EKG Report.  Rate: 71  Rhythm: Sinus Rhythm  Reading: No significant change when compared to EKG that was performed earlier today at 8:57 AM.                          MDM      History obtained from patient.     Differential diagnosis includes fluid overload secondary to missed dialysis, CHF, anemia with or without high-output failure, patient has known history of diverticulosis,  possible diverticular bleeding.  No evidence of appendicitis or cholecystitis on previous CT scan.  No findings to explain patient's pain.  He states this pain was present earlier today as well.  No fevers or chills.    Previous records reviewed including CT scan of the abdomen pelvis performed earlier this morning.  Results as noted below.        CT ABDOMEN+PELVIS(CPT=74176)    Result Date: 8/30/2024  PROCEDURE:  CT ABDOMEN+PELVIS (CPT=74176)  COMPARISON:  [facility], CT, CT ABDOMEN+PELVIS(CONTRAST ONLY)(CPT=74177), 6/25/2024, 7:21 AM.  INDICATIONS:  rectal bleeding, SOB  TECHNIQUE:  Unenhanced multislice CT scanning was performed from the dome of the diaphragm to the pubic symphysis.  Dose reduction techniques were used. Dose information is transmitted to the ACR (American College of Radiology) NRDR (National Radiology Data Registry) which includes the Dose Index Registry.  PATIENT STATED HISTORY: (As transcribed by Technologist)  Patient has rectal bleeding and shortness of breath.    FINDINGS:  LIVER:  No enlargement, atrophy, abnormal density, or significant focal lesion.  BILIARY:  No visible dilatation or calcification.  PANCREAS:  No lesion, fluid collection, ductal dilatation, or atrophy.  SPLEEN:  No enlargement or focal lesion.  KIDNEYS:  No mass, obstruction, or calcification.  ADRENALS:  No mass or enlargement.  AORTA/VASCULAR:    Unremarkable as seen on non-contrast imaging. RETROPERITONEUM:  No mass or adenopathy.  BOWEL/MESENTERY:  No visible mass, obstruction, or bowel wall thickening.  Appendix is normal. ABDOMINAL WALL:  Fat containing bilateral inguinal hernias are noted.  URINARY BLADDER:  No visible focal wall thickening, lesion, or calculus.  PELVIC NODES:  No adenopathy.  PELVIC ORGANS:  No visible mass.  Pelvic organs appropriate for patient age.  BONES:  No bony lesion or fracture.  LUNG BASES:  Small right effusion has increased in size compared to the prior exam.  Loculated effusion along  the anterior aspect of the right hemithorax appears progressed from the prior exam.  Right basilar atelectatic changes noted.  Mildly prominent cardiophrenic lymph nodes are noted which are nonspecific. OTHER:  Negative.             CONCLUSION:  1. Small right pleural effusion is increased in size compared to the prior examination.  The right effusion appears partially loculated.  Right basilar atelectatic changes present.   LOCATION:  [facility]   Dictated by (CST): Duy Fitzpatrick MD on 8/30/2024 at 10:55 AM     Finalized by (CST): Duy Fitzpatrick MD on 8/30/2024 at 11:01 AM               Testing considered and ordered includes EKG, chest x-ray, CBC, CMP, type and screen.  Coagulation studies were performed earlier today.  These were reviewed and were normal.    I reviewed all results.  CMP reviewed with a potassium of 5.6.  BUN at 102 and a creatinine of 10.44.  Calcium 8.4.  GFR 6.  LFTs are normal.  CBC reviewed.  Hemoglobin has increased from 8.1 earlier today to 8.7.  WBC at 7.8 without neutrophilic shift.  Patient is afebrile.  No complaints of URI symptoms.  I do not suspect pneumonia.  No additional episodes of GI bleeding in the ED.    I personally reviewed the radiographs and my individual interpretation shows enlarging right pleural effusion.  Pulmonary vascular congestion.  Cardiomegaly.    I also reviewed the official report which shows   XR CHEST AP PORTABLE  (CPT=71045)    Result Date: 8/30/2024  PROCEDURE:  XR CHEST AP PORTABLE  (CPT=71045)  TECHNIQUE:  AP chest radiograph was obtained.  COMPARISON:  [facility], XR, XR CHEST AP PORTABLE  (CPT=71045), 8/30/2024, 8:57 AM.  INDICATIONS:  rectal bleeding and chest pain  PATIENT STATED HISTORY: (As transcribed by Technologist)  rectal bleeding and chest pain.    FINDINGS:  Tunneled right internal jugular hemodialysis catheter remains in place.  Stable cardiomegaly with valve prosthesis.  Mild pulmonary vascular congestion and perihilar interstitial edema.   Increased right pleural effusion and right basilar consolidation/atelectasis.            CONCLUSION:  1. Increased right pleural effusion and right basilar atelectasis/consolidation. 2. Stable vascular congestion interstitial edema suggestive of CHF or fluid overload.  LOCATION:  [facility]      Dictated by (CST): Ricki Zaragoza MD on 8/30/2024 at 11:43 PM     Finalized by (CST): Ricki Zaragoza MD on 8/30/2024 at 11:44 PM         Others who assisted in patient's care included duly hospitalist.  Will notify nephrology of consultation as patient will require emergent dialysis given presence of hypoxic respiratory failure requiring BiPAP at this time.  He is stable on BiPAP.  Potassium is also slightly elevated.  This should improve with dialysis.  No acute EKG changes.    Case was discussed with nephrology from the ED.    Interventions in care included Lasix 40 mg and Dilaudid.  BiPAP.    A total of 34 minutes of critical care time (exclusive of billable procedures) was administered to manage the patient's respiratory instability due to his acute hypoxic respiratory failure due to fluid overload with patient requiring dialysis.  This involved direct patient intervention, complex decision making, and/or extensive discussions with the patient, family, and clinical staff.      Admission disposition: 8/30/2024 11:57 PM                                        Medical Decision Making      Disposition and Plan     Clinical Impression:  1. Hypervolemia, unspecified hypervolemia type    2. End stage renal disease on dialysis (HCC)    3. Gastrointestinal hemorrhage, unspecified gastrointestinal hemorrhage type    4. Acute respiratory failure with hypoxia (HCC)         Disposition:  Admit  8/30/2024 11:57 pm    Follow-up:  No follow-up provider specified.        Medications Prescribed:  Current Discharge Medication List                            Hospital Problems       Present on Admission  Date Reviewed: 8/8/2024            ICD-10-CM  Noted POA    * (Principal) Hypervolemia, unspecified hypervolemia type E87.70 8/30/2024 Unknown

## 2024-08-31 NOTE — H&P
General Medicine H&P     Chief Complaint   Patient presents with    GI Bleeding    Difficulty Breathing     Missed Friday dialysis  feels  overloaded        PCP: Prieto Novak MD    History of Present Illness: Patient is a 44 year old male with PMH significant for ESRD on HD, bipolar, depression, DM 2, HTN, DL, CAD, COPD, HFpEF, chronic abdominal pain presents to the ED c sob and GIB.     Pt currently on bipap and getting HD. Feels knot in his abd. Reports dark stool/clots. 144/93.  Tolerating HD.     Admission: 6/14/2024- 6/17/2024 adfer missed HD       Past Medical History:    Asthma ([facility])    Attention deficit hyperactivity disorder (ADHD)    Back problem    Bipolar 1 disorder (HCC)    CKD (chronic kidney disease) stage 3, GFR 30-59 ml/min ([facility])    Dr Meeks    Congestive heart disease ([facility])    COPD (chronic obstructive pulmonary disease) ([facility])    Coronary atherosclerosis    Deep vein thrombosis ([facility])    at age 17 R/T cast    Depression    Diabetes ([facility])    Essential hypertension    3/21 echo: severe concentric LVH with normal EF and no MR or pHTN    Extrinsic asthma, unspecified    Heart attack (HCC)    2016- angiogram- no intervention    Heart valve disease    mitral valve repair in 1994/    High blood pressure    High cholesterol    History of blood transfusion    History of mitral valve repair    Hyperlipidemia    Low back pain    tight and stiff after sweeping and mopping    LVH (left ventricular hypertrophy)    Migraines    Mixed hyperlipidemia     HDL 38 LDL 97 VLDL 57     Monoclonal gammopathy    IgG kappa     MVP (mitral valve prolapse)    Repair 1994 at [place]; echoes as recently as 3/21 show mild or trivial MR and no stenosis    Neuropathy    Osteoarthritis    hip ,knees    Pneumonia due to organism    Pulmonary embolism (HCC)    Renal disorder    Stroke (HCC)    TIA (transient ischemic attack)    Initial history of left-sided weakness and slurred speech. (+) cocaine. MRI of the brain, CT  angiogram of the head and neck, and 2D echo are all unremarkable.     TMJ (dislocation of temporomandibular joint)    Troponin level elevated    Trop 60 60 47 with TIA and no CP: Lexiscan negative with EF 51      Past Surgical History:   Procedure Laterality Date    Av fistula revision, open Left     Colonoscopy N/A 2023    Procedure: COLONOSCOPY;  Surgeon: Heath Vu MD;  Location:  ENDOSCOPY    Colonoscopy N/A 2023    Procedure: COLONOSCOPY with cold snare polypectomy and forcep polypectomy;  Surgeon: Ousmane Suarez MD;  Location:  ENDOSCOPY    Colonoscopy & polypectomy  2019    Egd  2019    Duodenitis. Biopsied. EUS for weight loss was negative    Heart surgery      Hernia surgery  2022    Dr Barnes    Laminectomy,>2 sgmt,lumbar  2018    L4-L5 Decomp Discectomy ROEM L4-L5    Mitralplasty w cp bypass      [facility]: Repair    Repair rotator cuff,chronic Left     torn and had a ruptured bicep    Valve repair      mitral valve        ALL:  Allergies   Allergen Reactions    Hydrochlorothiazide RASH and HIVES        heparin, 5,000 Units, Q8H MARTHA  ARIPiprazole, 5 mg, Daily  buPROPion ER, 150 mg, Daily  carvedilol, 25 mg, BID with meals  fenofibrate micronized, 134 mg, Nightly  FLUoxetine HCl, 40 mg, Daily  isosorbide mononitrate ER, 30 mg, Daily  lamoTRIgine, 150 mg, Daily  NIFEdipine ER, 60 mg, BID  losartan, 100 mg, Daily  amphetamine-dextroamphetamine, 10 mg, BID AC  sevelamer carbonate, 800 mg, TID CC  piperacillin-tazobactam, 3.375 g, Q12H  hydrocortisone, 25 mg, BID  cloNIDine, 0.1 mg, BID        Social History     Tobacco Use    Smoking status: Former     Current packs/day: 0.00     Average packs/day: 1 pack/day for 27.0 years (27.0 ttl pk-yrs)     Types: Cigarettes     Start date: 1995     Quit date: 2022     Years since quittin.5     Passive exposure: Never    Smokeless tobacco: Never   Substance Use Topics    Alcohol use: No        Fam Hx  Family History    Problem Relation Age of Onset    Hypertension Father     Alcohol and Other Disorders Associated Father     Substance Abuse Father         cocaine    Dementia Father     Cancer Father         lung    Diabetes Mother     Cancer Mother         multiple myeloma    Hypertension Mother     Anxiety Maternal Aunt     Depression Maternal Aunt     Anxiety Maternal Aunt     Depression Maternal Aunt     Bipolar Disorder Maternal Aunt     Diabetes Maternal Grandmother     Hypertension Maternal Grandmother     Cancer Maternal Grandfather         stomach cancer    Diabetes Maternal Grandfather     Hypertension Maternal Grandfather     Alcohol and Other Disorders Associated Maternal Grandfather     Hypertension Paternal Grandmother     Hypertension Paternal Grandfather     Cancer Sister         uterine and ovarian    Hypertension Sister     Cancer Maternal Uncle         lung    Cancer Paternal Aunt         throat       Review of Systems  Comprehensive ROS reviewed and negative except for what's stated above. \    OBJECTIVE:  BP (!) 144/115 (BP Location: Left arm)   Pulse 75   Temp 98.1 °F (36.7 °C) (Axillary)   Resp 19   Ht 6' 2\" (1.88 m)   Wt 258 lb 13.1 oz (117.4 kg)   SpO2 95%   BMI 33.23 kg/m²     General:  Alert, no distress, appears stated age.    Head:  Normocephalic, without obvious abnormality, atraumatic.   Eyes:  Sclera anicteric, EOMs intact.    Nose: Nares normal,  Mucosa normal    Throat: Lips normal   Neck: Supple, symmetrical, trachea midline   Lungs:   Clear to auscultation bilaterally. Normal effort   Chest wall:  No tenderness or deformity   Heart:  Regular rate and rhythm, S1, S2 normal, no murmur, rub or gallop appreciated   Abdomen:   Soft, NT/ND, Bowel sounds normal. No masses,  No organomegaly.    Extremities/MSK: Extremities normal/normal movement, atraumatic, no cyanosis  or edema.   Skin: Skin color, texture, turgor normal. No rashes or lesions.    Neurologic: Moving all extremities  spontaneously, no focal deficit appreciated          LABS:   Lab Results   Component Value Date    WBC 11.2 08/31/2024    HGB 9.3 08/31/2024    HCT 28.3 08/31/2024    .0 08/31/2024    CREATSERUM 6.77 08/31/2024    BUN 67 08/31/2024     08/31/2024    K 4.0 08/31/2024     08/31/2024    CO2 24.0 08/31/2024    GLU 82 08/31/2024    CA 8.7 08/31/2024    ALB 4.3 08/30/2024    ALKPHO 99 08/30/2024    BILT 0.3 08/30/2024    TP 7.4 08/30/2024    AST 31 08/30/2024    ALT 23 08/30/2024    PGLU 107 08/31/2024       Radiology: XR CHEST AP PORTABLE  (CPT=71045)    Result Date: 8/30/2024  PROCEDURE:  XR CHEST AP PORTABLE  (CPT=71045)  TECHNIQUE:  AP chest radiograph was obtained.  COMPARISON:  [facility], XR, XR CHEST AP PORTABLE  (CPT=71045), 8/30/2024, 8:57 AM.  INDICATIONS:  rectal bleeding and chest pain  PATIENT STATED HISTORY: (As transcribed by Technologist)  rectal bleeding and chest pain.    FINDINGS:  Tunneled right internal jugular hemodialysis catheter remains in place.  Stable cardiomegaly with valve prosthesis.  Mild pulmonary vascular congestion and perihilar interstitial edema.  Increased right pleural effusion and right basilar consolidation/atelectasis.            CONCLUSION:  1. Increased right pleural effusion and right basilar atelectasis/consolidation. 2. Stable vascular congestion interstitial edema suggestive of CHF or fluid overload.  LOCATION:  [facility]      Dictated by (CST): Ricki Zaragoza MD on 8/30/2024 at 11:43 PM     Finalized by (CST): Ricki Zaragoza MD on 8/30/2024 at 11:44 PM       CT ABDOMEN+PELVIS(CPT=74176)    Result Date: 8/30/2024  PROCEDURE:  CT ABDOMEN+PELVIS (CPT=74176)  COMPARISON:  [facility], CT, CT ABDOMEN+PELVIS(CONTRAST ONLY)(CPT=74177), 6/25/2024, 7:21 AM.  INDICATIONS:  rectal bleeding, SOB  TECHNIQUE:  Unenhanced multislice CT scanning was performed from the dome of the diaphragm to the pubic symphysis.  Dose reduction techniques were used. Dose information is  transmitted to the ACR (American College of Radiology) NRDR (National Radiology Data Registry) which includes the Dose Index Registry.  PATIENT STATED HISTORY: (As transcribed by Technologist)  Patient has rectal bleeding and shortness of breath.    FINDINGS:  LIVER:  No enlargement, atrophy, abnormal density, or significant focal lesion.  BILIARY:  No visible dilatation or calcification.  PANCREAS:  No lesion, fluid collection, ductal dilatation, or atrophy.  SPLEEN:  No enlargement or focal lesion.  KIDNEYS:  No mass, obstruction, or calcification.  ADRENALS:  No mass or enlargement.  AORTA/VASCULAR:    Unremarkable as seen on non-contrast imaging. RETROPERITONEUM:  No mass or adenopathy.  BOWEL/MESENTERY:  No visible mass, obstruction, or bowel wall thickening.  Appendix is normal. ABDOMINAL WALL:  Fat containing bilateral inguinal hernias are noted.  URINARY BLADDER:  No visible focal wall thickening, lesion, or calculus.  PELVIC NODES:  No adenopathy.  PELVIC ORGANS:  No visible mass.  Pelvic organs appropriate for patient age.  BONES:  No bony lesion or fracture.  LUNG BASES:  Small right effusion has increased in size compared to the prior exam.  Loculated effusion along the anterior aspect of the right hemithorax appears progressed from the prior exam.  Right basilar atelectatic changes noted.  Mildly prominent cardiophrenic lymph nodes are noted which are nonspecific. OTHER:  Negative.             CONCLUSION:  1. Small right pleural effusion is increased in size compared to the prior examination.  The right effusion appears partially loculated.  Right basilar atelectatic changes present.   LOCATION:  [facility]   Dictated by (CST): Duy Fitzpatrick MD on 8/30/2024 at 10:55 AM     Finalized by (CST): Duy Fitzpatrick MD on 8/30/2024 at 11:01 AM       XR CHEST AP PORTABLE  (CPT=71045)    Result Date: 8/30/2024  PROCEDURE:  XR CHEST AP PORTABLE  (CPT=71045)  TECHNIQUE:  AP chest radiograph was obtained.  COMPARISON:   [facility], XR, XR CHEST PA + LAT CHEST (CPT=71046), 8/11/2024, 5:57 PM.  [facility], XR, XR CHEST AP PORTABLE  (CPT=71045), 8/08/2024, 5:00 PM.  INDICATIONS:  rectal bleeding, SOB  PATIENT STATED HISTORY: (As transcribed by Technologist)  Patient had onset of right sided abdominal pain with bloating/swelling to the abdomen, on 9/29/24.  He also complains of shortness of breath.  He denies any nausea, vomiting or specific chest pain.  Patient has a history of mitrovalve surgery in 2022.    FINDINGS:  Stable right-sided dialysis catheter.  Cardiomegaly.  Cardiac valve prosthesis.  Interstitial opacities bilaterally.  Small right-sided pleural effusion right basilar atelectasis/infiltrates.  No pneumothorax.            CONCLUSION:  1. Cardiomegaly with interstitial opacities likely representing edema. 2. Small right-sided pleural effusion with right basilar atelectasis/infiltrates.    LOCATION:  [facility]      Dictated by (CST): Quinn Bernal MD on 8/30/2024 at 9:16 AM     Finalized by (CST): Quinn Bernal MD on 8/30/2024 at 9:22 AM       XR CHEST PA + LAT CHEST (CPT=71046)    Result Date: 8/11/2024  PROCEDURE:  XR CHEST PA + LAT CHEST (CPT=71046)  INDICATIONS:  seen two days ago dx with pnuemonia, increased perla and pain in back and abdomen  COMPARISON:  [facility], XR, XR CHEST AP PORTABLE  (CPT=71045), 8/08/2024, 5:00 PM.  TECHNIQUE:  PA and lateral chest radiographs were obtained.  PATIENT STATED HISTORY: (As transcribed by Technologist)  Patient shares he has right-sided chest pain and pain with breathing. Recent diagnosis of pneumonia.               CONCLUSION:  Worsening consolidation and effusion in the right lung base.  Central venous catheter tip SVC.  Normal heart size and pulmonary vascularity.  No pneumothorax.   LOCATION:  [facility]   Dictated by (CST): Arlyn Oglesby MD on 8/11/2024 at 6:30 PM     Finalized by (CST): Arlyn Oglesby MD on 8/11/2024 at 6:30 PM       XR CHEST AP PORTABLE   (CPT=71045)    Result Date: 8/8/2024  PROCEDURE:  XR CHEST AP PORTABLE  (CPT=71045)  TECHNIQUE:  AP chest radiograph was obtained.  COMPARISON:  [facility], XR, XR CHEST AP PORTABLE  (CPT=71045), 6/25/2024, 8:53 AM.  [facility], XR, XR CHEST AP PORTABLE  (CPT=71045), 6/14/2024, 5:12 PM.  INDICATIONS:  From [facility]- shortness of breath since last night  PATIENT STATED HISTORY: (As transcribed by Technologist)  Day 3 right side chest pain and short of breath. Port-a-cath insertion 2yrs ago.    FINDINGS:  Right-sided dialysis catheter is stable.  Cardiac size within normal limits.  Small right-sided pleural effusion with right basilar opacity.  No pneumothorax.            CONCLUSION:  Small right-sided pleural effusion with right basilar atelectasis/infiltrates.   LOCATION:  [facility]      Dictated by (CST): Quinn Bernal MD on 8/08/2024 at 5:20 PM     Finalized by (CST): Quinn Bernal MD on 8/08/2024 at 5:20 PM            ASSESSMENT / PLAN:    Patient is a 44 year old male with PMH significant for ESRD on HD, bipolar, depression, DM 2, HTN, DL, CAD, COPD, HFpEF, chronic abdominal pain presents to the ED c sob and GIB.     Dark stool  - apprec GI recs  - hgb stable, EGD and colonoscopy reportedly normal earlier this month  - outpatient capsule endoscopy and f/u with Dr. Heath Vu   -anusol suppository bid prn for bleeding   - follow sxs and hgb     Acute respiratory failure  -likely 2/2 pulm edema  - apprec pulm recs  - HD  - empiric zosyn   - consider CTA, tx ICU if worsens     ESRD on HD  -HD T/TH/Sa  -nephrology consulted     Chronic pain  Likely drug use dependance  -Follows with pain clinic outpatient  -Resume home pain medication.  -minimize iv pain meds     Anemia  -chronic - hgb 8.7 to 9.3   -hx of GI bleed     HTN  DL  CAD  HFpEF  -resume home meds     DM2  -hold po meds  -ISS +accuchecks     COPD  -no evidence of acute exacerbation  -resume home inhalers     MDD  Bipolar   -resume home meds      Leukocytosis  -follow, 7.8 to 11.2         Outpatient records or previous hospital records reviewed. [facility] hospitalist to continue to follow patient while in house. A total of 75 minutes taken.  Greater than 50% face to face encounter.  D/w RN     Yuriy Forrest MD  [facility] Hospitalist  Pager: [phone]  8/31/2024  3:11 PM

## 2024-08-31 NOTE — CONSULTS
[facility]   part of [facility]    Report of Consultation    Godwin Fonseca Patient Status:  Inpatient    1980 MRN [id number]   Location [facility] 2NE-A Attending Yuriy Forrest MD   Hosp Day # 0 PCP Prieto Novak MD       REASON FOR CONSULT:     ESRD    HISTORY OF PRESENT ILLNESS:     45 yo M with history of ESRD on iHD MWF via RIJ CVC, hyperaldosteronism, DM, biopolar disorder presented with abd pain, BRBPR, sob.    Last HD Wednesday.  Hypertensive and wearing BIPAP. Being transferred to ICU.   No leg swelling.  Seen on iHD - tolerating, no cramping.      REVIEW OF SYSTEMS:     Please see HPI for pertinent positives. 10 point review of systems otherwise reviewed and negative.     HISTORY:     Past Medical History:    Asthma ([facility])    Attention deficit hyperactivity disorder (ADHD)    Back problem    Bipolar 1 disorder ([facility])    CKD (chronic kidney disease) stage 3, GFR 30-59 ml/min ([facility])    Dr Meeks    Congestive heart disease ([facility])    COPD (chronic obstructive pulmonary disease) ([facility])    Coronary atherosclerosis    Deep vein thrombosis ([facility])    at age 17 R/T cast    Depression    Diabetes ([facility])    Essential hypertension    3/21 echo: severe concentric LVH with normal EF and no MR or pHTN    Extrinsic asthma, unspecified    Heart attack (HCC)    - angiogram- no intervention    Heart valve disease    mitral valve repair in /    High blood pressure    High cholesterol    History of blood transfusion    History of mitral valve repair    Hyperlipidemia    Low back pain    tight and stiff after sweeping and mopping    LVH (left ventricular hypertrophy)    Migraines    Mixed hyperlipidemia     HDL 38 LDL 97 VLDL 57     Monoclonal gammopathy    IgG kappa     MVP (mitral valve prolapse)    Repair  at [place]; echoes as recently as 3/21 show mild or trivial MR and no stenosis    Neuropathy    Osteoarthritis    hip ,knees    Pneumonia due to organism    Pulmonary embolism  (HCC)    Renal disorder    Stroke (HCC)    TIA (transient ischemic attack)    Initial history of left-sided weakness and slurred speech. (+) cocaine. MRI of the brain, CT angiogram of the head and neck, and 2D echo are all unremarkable.     TMJ (dislocation of temporomandibular joint)    Troponin level elevated    Trop 60 60 47 with TIA and no CP: Lexiscan negative with EF 51     Past Surgical History:   Procedure Laterality Date    Av fistula revision, open Left     Colonoscopy N/A 03/26/2023    Procedure: COLONOSCOPY;  Surgeon: Heath Vu MD;  Location:  ENDOSCOPY    Colonoscopy N/A 12/30/2023    Procedure: COLONOSCOPY with cold snare polypectomy and forcep polypectomy;  Surgeon: Ousmane Suarez MD;  Location:  ENDOSCOPY    Colonoscopy & polypectomy  2019    Egd  2019    Duodenitis. Biopsied. EUS for weight loss was negative    Heart surgery      Hernia surgery  08/17/2022    Dr Barnes    Laminectomy,>2 sgmt,lumbar  09/06/2018    L4-L5 Decomp Discectomy ROEM L4-L5    Mitralplasty w cp bypass  1994    [facility]: Repair    Repair rotator cuff,chronic Left     torn and had a ruptured bicep    Valve repair  1994    mitral valve     Family History   Problem Relation Age of Onset    Hypertension Father     Alcohol and Other Disorders Associated Father     Substance Abuse Father         cocaine    Dementia Father     Cancer Father         lung    Diabetes Mother     Cancer Mother         multiple myeloma    Hypertension Mother     Anxiety Maternal Aunt     Depression Maternal Aunt     Anxiety Maternal Aunt     Depression Maternal Aunt     Bipolar Disorder Maternal Aunt     Diabetes Maternal Grandmother     Hypertension Maternal Grandmother     Cancer Maternal Grandfather         stomach cancer    Diabetes Maternal Grandfather     Hypertension Maternal Grandfather     Alcohol and Other Disorders Associated Maternal Grandfather     Hypertension Paternal Grandmother     Hypertension Paternal Grandfather     Cancer  Sister         uterine and ovarian    Hypertension Sister     Cancer Maternal Uncle         lung    Cancer Paternal Aunt         throat      reports that he quit smoking about 2 years ago. His smoking use included cigarettes. He started smoking about 29 years ago. He has a 27 pack-year smoking history. He has never been exposed to tobacco smoke. He has never used smokeless tobacco. He reports that he does not drink alcohol and does not use drugs.    ALLERGIES:     Allergies   Allergen Reactions    Hydrochlorothiazide RASH and HIVES       MEDICATIONS:       Current Facility-Administered Medications:     heparin (Porcine) 5000 UNIT/ML injection 5,000 Units, 5,000 Units, Subcutaneous, Q8H MARTHA    acetaminophen (Tylenol Extra Strength) tab 500 mg, 500 mg, Oral, Q4H PRN    acetaminophen (Tylenol) tab 650 mg, 650 mg, Oral, Q4H PRN **OR** HYDROcodone-acetaminophen (Norco) 5-325 MG per tab 1 tablet, 1 tablet, Oral, Q4H PRN **OR** HYDROcodone-acetaminophen (Norco) 5-325 MG per tab 2 tablet, 2 tablet, Oral, Q4H PRN    melatonin tab 3 mg, 3 mg, Oral, Nightly PRN    ondansetron (Zofran) 4 MG/2ML injection 4 mg, 4 mg, Intravenous, Q6H PRN    prochlorperazine (Compazine) 10 MG/2ML injection 5 mg, 5 mg, Intravenous, Q8H PRN    ARIPiprazole (Abilify) tab 5 mg, 5 mg, Oral, Daily    buPROPion ER (Wellbutrin XL) 24 hr tab 150 mg, 150 mg, Oral, Daily    carvedilol (Coreg) tab 25 mg, 25 mg, Oral, BID with meals    cloNIDine (Catapres) tab 0.1 mg, 0.1 mg, Oral, Daily with food    fenofibrate micronized (Lofibra) cap 134 mg, 134 mg, Oral, Nightly    FLUoxetine (PROzac) cap 40 mg, 40 mg, Oral, Daily    isosorbide mononitrate ER (Imdur) 24 hr tab 30 mg, 30 mg, Oral, Daily    lamoTRIgine (LaMICtal) tab 150 mg, 150 mg, Oral, Daily    NIFEdipine ER (Procardia-XL) 24 hr tab 60 mg, 60 mg, Oral, BID    losartan (Cozaar) tab 100 mg, 100 mg, Oral, Daily    amphetamine-dextroamphetamine (Adderall) tab 10 mg, 10 mg, Oral, BID AC    sevelamer carbonate  (Renvela) tab 800 mg, 800 mg, Oral, TID CC    ipratropium-albuterol (Duoneb) 0.5-2.5 (3) MG/3ML inhalation solution 3 mL, 3 mL, Nebulization, Q6H PRN    sodium chloride 0.9 % IV bolus 100 mL, 100 mL, Intravenous, Q30 Min PRN **AND** albumin human (Albumin) 25% injection 25 g, 25 g, Intravenous, PRN Dialysis    HYDROmorphone (Dilaudid) 1 MG/ML injection 0.1 mg, 0.1 mg, Intravenous, Q2H PRN **OR** HYDROmorphone (Dilaudid) 1 MG/ML injection 0.2 mg, 0.2 mg, Intravenous, Q2H PRN **OR** HYDROmorphone (Dilaudid) 1 MG/ML injection 0.4 mg, 0.4 mg, Intravenous, Q2H PRN    piperacillin-tazobactam (Zosyn) 3.375 g in dextrose 5% 100 mL IVPB-ADDV, 3.375 g, Intravenous, Q12H    hydrocortisone (Anusol-HC) 25 MG rectal suppository 25 mg, 25 mg, Rectal, BID  Prior to Admission Medications   Medication Sig    hydrocortisone 25 MG Rectal Suppos Place 1 suppository (25 mg total) rectally 2 (two) times daily.         PHYSICAL EXAM:     Vital Signs: BP (!) 144/115 (BP Location: Left arm)   Pulse 75   Temp 98.1 °F (36.7 °C) (Axillary)   Resp 19   Ht 188 cm (6' 2\")   Wt 258 lb 13.1 oz (117.4 kg)   SpO2 95%   BMI 33.23 kg/m²   Temp (24hrs), Av.9 °F (36.6 °C), Min:97 °F (36.1 °C), Max:98.2 °F (36.8 °C)       Intake/Output Summary (Last 24 hours) at 2024 1240  Last data filed at 2024 1232  Gross per 24 hour   Intake --   Output 4400 ml   Net -4400 ml     Wt Readings from Last 3 Encounters:   24 258 lb 13.1 oz (117.4 kg)   24 250 lb (113.4 kg)   24 240 lb (108.9 kg)       General: pleasant, well appearing  Skin: no visible rashes  HEENT: NCAT  Cardiac: Regular rate and rhythm, no murmur/gallop or rub  Lungs: CTAB, no wheeze, no rale, no rhonchi  Abdomen: Soft, NTND  Extremities: warm, well perfused, doughy edema in lower legs  Neurologic/Psych: mentating well  RI CVC    LABORATORY DATA:       Lab Results   Component Value Date    GLU 82 2024    BUN 67 (H) 2024    BUNCREA 13.0 2022     CREATSERUM 6.77 (H) 08/31/2024    ANIONGAP 12 08/31/2024    GFR 59 (L) 01/04/2018    GFRNAA 30 (L) 07/12/2022    GFRAA 35 (L) 07/12/2022    CA 8.7 08/31/2024    OSMOCALC 312 (H) 08/31/2024    ALKPHO 99 08/30/2024    AST 31 08/30/2024    ALT 23 08/30/2024    BILT 0.3 08/30/2024    TP 7.4 08/30/2024    ALB 4.3 08/30/2024    GLOBULIN 3.1 08/30/2024     08/31/2024    K 4.0 08/31/2024     08/31/2024    CO2 24.0 08/31/2024     Lab Results   Component Value Date    WBC 11.2 (H) 08/31/2024    RBC 2.80 (L) 08/31/2024    HGB 9.3 (L) 08/31/2024    HCT 28.3 (L) 08/31/2024    .0 08/31/2024    MPV 11.5 12/14/2012    .1 (H) 08/31/2024    MCH 33.2 08/31/2024    MCHC 32.9 08/31/2024    RDW 17.5 08/31/2024    NEPRELIM 5.24 08/30/2024    NEPERCENT 67.3 08/30/2024    LYPERCENT 18.4 08/30/2024    MOPERCENT 10.0 08/30/2024    EOPERCENT 2.8 08/30/2024    BAPERCENT 1.2 08/30/2024    NE 5.24 08/30/2024    LYMABS 1.43 08/30/2024    MOABSO 0.78 08/30/2024    EOABSO 0.22 08/30/2024    BAABSO 0.09 08/30/2024     Lab Results   Component Value Date    MALBP 167.00 05/24/2023    CREUR 142.00 05/24/2023    CREUR 142.00 05/24/2023     Lab Results   Component Value Date    COLORUR Yellow 01/03/2024    CLARITY Clear 01/03/2024    SPECGRAVITY 1.025 01/03/2024    GLUUR Negative 01/03/2024    BILUR Negative 01/03/2024    KETUR Trace (A) 01/03/2024    BLOODURINE Negative 01/03/2024    PHURINE 5.5 01/03/2024    PROUR >=300 (A) 01/03/2024    UROBILINOGEN 0.2 01/03/2024    NITRITE Negative 01/03/2024    LEUUR Negative 01/03/2024    WBCUR 6-10 (A) 01/03/2024    RBCUR 0-2 01/03/2024    EPIUR Few (A) 01/03/2024    BACUR Rare (A) 01/03/2024    CAOXUR Occasional (A) 04/20/2018    HYLUR Present (A) 05/14/2019         IMAGING:     Reviewed.      ASSESSMENT/PLAN:     45 yo M with history of ESRD on iHD MWF via RIJ CVC, hyperaldosteronism, DM, biopolar disorder presented with abd pain, BRBPR, sob.    ESRD:  -- HD today with 4L UF  --  possible isolated UF tomorrow pending respiratory status    Anemia in ESRD:  -- defer OWEN with high BP    MBD:  -- phos with AM labs  -- resumed sevelamer    Hyperkalemia:  -- 2K bath    HTN:  -- restarted coreg 35 mg BID, imdur 30 mg/d, losartan 100 mg/d, nifedipine XL 60 mg/d  -- change clonidine from daily dosing to BID dosing to avoid rebound  -- consider spironolactone pending BP trend    Hypoxia:  -- CXR with pleural effusions and interstitial edema  -- UF per above    GI bleeding:  -- plan for outpatient capsule endoscopy per notes    Will follow.      Thank you for allowing me to participate in the care of your patient. Please do not hesitate to contact me with concerns or questions.    Lenka Bond MD  [facility]  [address] [place] [id number]    8/31/2024  12:40 PM

## 2024-08-31 NOTE — PROGRESS NOTES
Patient seen and examined.  He is now on HD and is doing better after 1 liter removal.  Will continue to monitor in the floor as I expect that he will be much improved after removal of 3 liters.  If any worsening then will transfer.  Discussed with staff RN who agrees

## 2024-08-31 NOTE — PROGRESS NOTES
NURSING ADMISSION NOTE      Patient admitted via Cart  Oriented to room.  Safety precautions initiated.  Bed in low position.  Call light in reach.      Pt Oriented to room. Call light in reach. Menu in room. Tele monitor on. VS taken. Head to Toe complete. Admission navigators complete including PTA medications. Consults aware of proper medications and of new patient.     Assumed patient at 0300. Alert and oriented x 4 on BIPAP. Lung sounds diminished bilaterally. Patient on BIPAP. Abdomen distended, tender. Skin chest done with PCP and RN - skin intact. Continent of bowel and bladder. Up standby.  Patient updated on plan of care and verbalized understanding.     0521 - patient complained of pain 8/10 PRN medication given    0623 - Patient complained of nausea. PRN medication given.     Plan of care: GI to see

## 2024-08-31 NOTE — CONSULTS
Pulmonary / Critical Care H&P/Consult       NAME: Godwin Fonseca - ROOM: [facility] - MRN: [id number] - Age: 44 year old - :  1980    Date of Admission: 2024 10:01 PM  Admission Diagnosis: End stage renal disease on dialysis (HCC) [N18.6, Z99.2]  Acute respiratory failure with hypoxia (HCC) [J96.01]  Gastrointestinal hemorrhage, unspecified gastrointestinal hemorrhage type [K92.2]  Hypervolemia, unspecified hypervolemia type [E87.70]    Reason for consult: resp failure       History of Present Illness: 43 yo M with h/o esrd on HD, h/o recurrent gi bleeding who presented with rectal bleeding and dyspnea.  Recent endoscopy as outpatient, was recommended to have capsul endoscopy. Had left osh ama because dialysis was being delayed.  On exam pt is tachynpeic despite bipap.     Past Medical History:    Asthma (Formerly [facility])    Attention deficit hyperactivity disorder (ADHD)    Back problem    Bipolar 1 disorder (Formerly [facility])    CKD (chronic kidney disease) stage 3, GFR 30-59 ml/min (Formerly [facility])    Dr Meeks    Congestive heart disease (Formerly [facility])    COPD (chronic obstructive pulmonary disease) (Formerly [facility])    Coronary atherosclerosis    Deep vein thrombosis (Formerly [facility])    at age 17 R/T cast    Depression    Diabetes (Formerly [facility])    Essential hypertension    3/21 echo: severe concentric LVH with normal EF and no MR or pHTN    Extrinsic asthma, unspecified    Heart attack (HCC)    - angiogram- no intervention    Heart valve disease    mitral valve repair in /    High blood pressure    High cholesterol    History of blood transfusion    History of mitral valve repair    Hyperlipidemia    Low back pain    tight and stiff after sweeping and mopping    LVH (left ventricular hypertrophy)    Migraines    Mixed hyperlipidemia     HDL 38 LDL 97 VLDL 57     Monoclonal gammopathy    IgG kappa     MVP (mitral valve prolapse)    Repair  at [place]; echoes as recently as 3/21 show mild or trivial MR and no stenosis    Neuropathy    Osteoarthritis     hip ,knees    Pneumonia due to organism    Pulmonary embolism (HCC)    Renal disorder    Stroke (HCC)    TIA (transient ischemic attack)    Initial history of left-sided weakness and slurred speech. (+) cocaine. MRI of the brain, CT angiogram of the head and neck, and 2D echo are all unremarkable.     TMJ (dislocation of temporomandibular joint)    Troponin level elevated    Trop 60 60 47 with TIA and no CP: Lexiscan negative with EF 51      Past Surgical History:   Procedure Laterality Date    Av fistula revision, open Left     Colonoscopy N/A 2023    Procedure: COLONOSCOPY;  Surgeon: Heath Vu MD;  Location:  ENDOSCOPY    Colonoscopy N/A 2023    Procedure: COLONOSCOPY with cold snare polypectomy and forcep polypectomy;  Surgeon: Ousmane Suarez MD;  Location:  ENDOSCOPY    Colonoscopy & polypectomy      Egd  2019    Duodenitis. Biopsied. EUS for weight loss was negative    Heart surgery      Hernia surgery  2022    Dr Barnes    Laminectomy,>2 sgmt,lumbar  2018    L4-L5 Decomp Discectomy ROEM L4-L5    Mitralplasty w cp bypass      [facility]: Repair    Repair rotator cuff,chronic Left     torn and had a ruptured bicep    Valve repair      mitral valve        Allergies:  Allergies   Allergen Reactions    Hydrochlorothiazide RASH and HIVES       Social History:  Social History     Socioeconomic History    Marital status:      Spouse name: Not on file    Number of children: Not on file    Years of education: Not on file    Highest education level: Not on file   Occupational History    Not on file   Tobacco Use    Smoking status: Former     Current packs/day: 0.00     Average packs/day: 1 pack/day for 27.0 years (27.0 ttl pk-yrs)     Types: Cigarettes     Start date: 1995     Quit date: 2022     Years since quittin.5     Passive exposure: Never    Smokeless tobacco: Never   Vaping Use    Vaping status: Never Used   Substance and Sexual Activity    Alcohol  use: No    Drug use: No    Sexual activity: Not on file   Other Topics Concern    Caffeine Concern Not Asked    Exercise Not Asked    Seat Belt Not Asked    Special Diet Not Asked    Stress Concern Not Asked    Weight Concern Not Asked   Social History Narrative    Not on file     Social Determinants of Health     Financial Resource Strain: Medium Risk (5/7/2024)    Received from [facility]    Overall Financial Resource Strain (CARDIA)     Difficulty of Paying Living Expenses: Somewhat hard   Food Insecurity: No Food Insecurity (8/31/2024)    Food Insecurity     Food Insecurity: Never true   Transportation Needs: No Transportation Needs (8/31/2024)    Transportation Needs     Lack of Transportation: No     Car Seat: Not on file   Physical Activity: Inactive (5/7/2024)    Received from [facility]    Exercise Vital Sign     Days of Exercise per Week: 0 days     Minutes of Exercise per Session: 0 min   Stress: Stress Concern Present (5/7/2024)    Received from [facility]    [employer] - Occupational Stress Questionnaire     Feeling of Stress : Rather much   Social Connections: Unknown (5/7/2024)    Received from [facility]    Social Connection and Isolation Panel [NHANES]     Frequency of Communication with Friends and Family: More than three times a week     Frequency of Social Gatherings with Friends and Family: More than three times a week     Attends Hinduism Services: Never     Active Member of Clubs or Organizations: No     Attends Club or Organization Meetings: Never     Marital Status: Patient unable to answer   Housing Stability: Low Risk  (8/31/2024)    Housing Stability     Housing Instability: No     Housing Instability Emergency: Not on file     Crib or [place]: Not on file        Family History:  Family History   Problem Relation Age of Onset    Hypertension Father     Alcohol and Other Disorders Associated Father     Substance  Abuse Father         cocaine    Dementia Father     Cancer Father         lung    Diabetes Mother     Cancer Mother         multiple myeloma    Hypertension Mother     Anxiety Maternal Aunt     Depression Maternal Aunt     Anxiety Maternal Aunt     Depression Maternal Aunt     Bipolar Disorder Maternal Aunt     Diabetes Maternal Grandmother     Hypertension Maternal Grandmother     Cancer Maternal Grandfather         stomach cancer    Diabetes Maternal Grandfather     Hypertension Maternal Grandfather     Alcohol and Other Disorders Associated Maternal Grandfather     Hypertension Paternal Grandmother     Hypertension Paternal Grandfather     Cancer Sister         uterine and ovarian    Hypertension Sister     Cancer Maternal Uncle         lung    Cancer Paternal Aunt         throat        Home Medications:  Outpatient Medications Marked as Taking for the 8/30/24 encounter (Hospital Encounter)   Medication Sig Dispense Refill    tamsulosin 0.4 MG Oral Cap Take 1 capsule (0.4 mg total) by mouth daily.      cloNIDine 0.1 MG Oral Tab Take 1 tablet (0.1 mg total) by mouth daily with food.      ARIPiprazole 5 MG Oral Tab Take 1 tablet (5 mg total) by mouth daily.      dicyclomine 10 MG Oral Cap Take 1 capsule (10 mg total) by mouth 3 (three) times daily as needed. 20 capsule 0    isosorbide mononitrate ER 30 MG Oral Tablet 24 Hr Take 1 tablet (30 mg total) by mouth daily. Hold if systolic blood pressure <130      Lisdexamfetamine Dimesylate 60 MG Oral Cap Take 1 capsule (60 mg total) by mouth every morning.      buPROPion  MG Oral Tablet 24 Hr Take 1 tablet (150 mg total) by mouth daily.      lamoTRIgine (LAMICTAL) 150 MG Oral Tab Take 1 tablet (150 mg total) by mouth daily. 7 tablet 0    FLUoxetine HCl 40 MG Oral Cap Take 1 capsule (40 mg total) by mouth daily. 7 capsule 0    RENVELA 800 MG Oral Tab Take 1 tablet (800 mg total) by mouth 3 (three) times daily with meals.      losartan 100 MG Oral Tab Take 1 tablet  (100 mg total) by mouth daily. Hold if systolic blood pressure <130      hydrALAZINE 50 MG Oral Tab Take 1 tablet (50 mg total) by mouth in the morning and 1 tablet (50 mg total) before bedtime. Hold if systolic blood pressure <130. 30 tablet 0    NIFEdipine ER 60 MG Oral Tablet 24 Hr Take 1 tablet (60 mg total) by mouth in the morning and 1 tablet (60 mg total) before bedtime. Hold if systolic blood pressure <130.      carvedilol 25 MG Oral Tab Take 1 tablet (25 mg total) by mouth in the morning and 1 tablet (25 mg total) in the evening. Take with meals. 60 tablet 6    Fenofibrate 134 MG Oral Cap Take 1 capsule by mouth nightly. 90 capsule 1    Fluticasone Propionate 50 MCG/ACT Nasal Suspension SPRAY ONCE INTO EACH NOSTRIL BID PRN 15.8 mL 0       Scheduled Medication:   heparin  5,000 Units Subcutaneous Q8H MARTHA    ARIPiprazole  5 mg Oral Daily    buPROPion ER  150 mg Oral Daily    carvedilol  25 mg Oral BID with meals    cloNIDine  0.1 mg Oral Daily with food    fenofibrate micronized  134 mg Oral Nightly    FLUoxetine HCl  40 mg Oral Daily    isosorbide mononitrate ER  30 mg Oral Daily    lamoTRIgine  150 mg Oral Daily    NIFEdipine ER  60 mg Oral BID    losartan  100 mg Oral Daily    amphetamine-dextroamphetamine  10 mg Oral BID AC    sevelamer carbonate  800 mg Oral TID CC     Continuous Infusing Medication:  PRN Medication:  acetaminophen    acetaminophen **OR** HYDROcodone-acetaminophen **OR** HYDROcodone-acetaminophen    melatonin    ondansetron    prochlorperazine    ipratropium-albuterol    sodium chloride **AND** albumin human     REVIEW OF SYSTEMS:   Reviewed and negative except per HPI    OBJECTIVE:  Vitals:    08/31/24 0230 08/31/24 0247 08/31/24 0346 08/31/24 0400   BP: (!) 155/112   (!) 151/114   BP Location: Right arm   Left arm   Pulse: 73  74 74   Resp: (!) 29   (!) 27   Temp: 98.2 °F (36.8 °C)   98.2 °F (36.8 °C)   TempSrc: Axillary   Axillary   SpO2: 96%  98% 95%   Weight: 258 lb 13.1 oz (117.4  kg) 258 lb 13.1 oz (117.4 kg)     Height:         O2: on bipap               Wt Readings from Last 3 Encounters:   08/31/24 258 lb 13.1 oz (117.4 kg)   08/30/24 250 lb (113.4 kg)   08/11/24 240 lb (108.9 kg)         Intake/Output Summary (Last 24 hours) at 8/31/2024 0920  Last data filed at 8/31/2024 0430  Gross per 24 hour   Intake --   Output 400 ml   Net -400 ml       BP (!) 151/114 (BP Location: Left arm)   Pulse 74   Temp 98.2 °F (36.8 °C) (Axillary)   Resp (!) 27   Ht 6' 2\" (1.88 m)   Wt 258 lb 13.1 oz (117.4 kg)   SpO2 95%   BMI 33.23 kg/m²     General Appearance:    Awake, tachypneic, on bipap    Head:    Normocephalic, without obvious abnormality, atraumatic   Eyes:    PERRL, conjunctiva/corneas clear   Neck:   Supple, symmetrical, trachea midline, no adenopathy;        thyroid:  No enlargement/tenderness/nodules; no carotid    bruit or JVD   Back:     Symmetric, no curvature, ROM normal, no CVA tenderness   Lungs:     Crackles bilat    Chest wall:    No tenderness or deformity   Heart:    Regular rate and rhythm, S1 and S2 normal, no murmur, rub   or gallop   Abdomen:     Soft, non-tender, bowel sounds active all four quadrants,     no masses, no organomegaly   Extremities:   Extremities normal, atraumatic, no cyanosis or edema   Pulses:   2+ and symmetric all extremities   Skin:   Skin color, texture, turgor normal, no rashes or lesions       Recent Labs   Lab 08/30/24  0840 08/30/24  2310   WBC 9.1 7.8   HGB 8.1* 8.7*   HCT 25.6* 27.5*   .0 256.0     Recent Labs   Lab 08/30/24  0840   INR 1.05       Recent Labs   Lab 08/30/24  0840 08/30/24  2310    141   K 4.7 5.6*    110   CO2 19.0* 25.0   * 102*   CREATSERUM 11.40* 10.44*   * 90   CA 8.0* 8.4*   AST 17 31   ALT 25 23   ALKPHO 86 99   BILT 0.3 0.3   ALB 3.5 4.3   TP 7.1 7.4         CREATININE (mg/dL)   Date Value   12/14/2012 1.1     Creatinine (mg/dL)   Date Value   08/30/2024 10.44 (H)   08/30/2024 11.40 (H)    08/08/2024 8.30 (H)   03/07/2022 2.87 (H)   10/11/2021 2.54 (H)   08/30/2021 2.72 (H)   ]        Imaging: cxr: c/w pulm edema and small R effusion     ASSESSMENT/PLAN:    Acute resp failure: suspect due to pulm edema. At risk for PE given recent hospitalizations as well as hap   - will start empiric zosyn   - dialysis per renal   - may require intubation given current resp status. Will transfer to medical icu, intensivist notified.   - consider ct angio when stabilized.   Esrd:   - hd per renal   Gi bleed; recurrent rectal bleeding, has had scopes recently without clear etiology   - gi consulted.   - follow hgb, transfuse for < 7   Fen: npo   Dispo: transfer to icu. D/w intensivist and floor nurse.     Critical Care Time greater than: 35 min              Anna Waller M.D.  [facility]  Pulmonary / Critical care  8/31/2024

## 2024-08-31 NOTE — PLAN OF CARE
Assumed pt care at approximately 0730. A&Ox4. BiPAP, tachypnea. Pt denies any pain or shortness of breath, vital signs stable, NSR on tele. Voids. Up with x1.     Plan of care: dialysis, bipap, possible tx to ICU-- reassess after HD     Plan of care updated with patient. Questions answered, pt verbalized understanding. Call light is within reach, all needs met at this time.      Problem: PAIN - ADULT  Goal: Verbalizes/displays adequate comfort level or patient's stated pain goal  Description: INTERVENTIONS:  - Encourage pt to monitor pain and request assistance  - Assess pain using appropriate pain scale  - Administer analgesics based on type and severity of pain and evaluate response  - Implement non-pharmacological measures as appropriate and evaluate response  - Consider cultural and social influences on pain and pain management  - Manage/alleviate anxiety  - Utilize distraction and/or relaxation techniques  - Monitor for opioid side effects  - Notify MD/LIP if interventions unsuccessful or patient reports new pain  - Anticipate increased pain with activity and pre-medicate as appropriate  Outcome: Progressing     Problem: RISK FOR INFECTION - ADULT  Goal: Absence of fever/infection during anticipated neutropenic period  Description: INTERVENTIONS  - Monitor WBC  - Administer growth factors as ordered  - Implement neutropenic guidelines  Outcome: Progressing     Problem: SAFETY ADULT - FALL  Goal: Free from fall injury  Description: INTERVENTIONS:  - Assess pt frequently for physical needs  - Identify cognitive and physical deficits and behaviors that affect risk of falls.  - Hamer fall precautions as indicated by assessment.  - Educate pt/family on patient safety including physical limitations  - Instruct pt to call for assistance with activity based on assessment  - Modify environment to reduce risk of injury  - Provide assistive devices as appropriate  - Consider OT/PT consult to assist with  strengthening/mobility  - Encourage toileting schedule  Outcome: Progressing     Problem: DISCHARGE PLANNING  Goal: Discharge to home or other facility with appropriate resources  Description: INTERVENTIONS:  - Identify barriers to discharge w/pt and caregiver  - Include patient/family/discharge partner in discharge planning  - Arrange for needed discharge resources and transportation as appropriate  - Identify discharge learning needs (meds, wound care, etc)  - Arrange for interpreters to assist at discharge as needed  - Consider post-discharge preferences of patient/family/discharge partner  - Complete POLST form as appropriate  - Assess patient's ability to be responsible for managing their own health  - Refer to Case Management Department for coordinating discharge planning if the patient needs post-hospital services based on physician/LIP order or complex needs related to functional status, cognitive ability or social support system  Outcome: Progressing

## 2024-08-31 NOTE — ED INITIAL ASSESSMENT (HPI)
Patient reports to ED for GI bleeding that has worsened since leaving [facility]ED this morning, was informed to come if bleeding worsened. Hgb was 8.1. Reports increased SOB and fatigue. Missed Dialysis today while at PED ER.

## 2024-08-31 NOTE — ED QUICK NOTES
Orders for admission, patient is aware of plan and ready to go upstairs. Any questions, please call ED RN daniel at extension b pod.     Patient Covid vaccination status: Fully vaccinated     COVID Test Ordered in ED: None    COVID Suspicion at Admission: N/A    Running Infusions:  None    Mental Status/LOC at time of transport  = a/ox4    Other pertinent information: dialysis mon wed fri-  Missed Friday dialysis d/t visit to [facility]=  \"Feels overloaded\" still makes urine  CIWA score: N/A   NIH score:  N/A

## 2024-08-31 NOTE — CONSULTS
[facility] IP Report of Consultation Gastroenterology    8/31/2024    Godwin Fonseca  male   Chung Alfaro MD     [id number]  4/12/1980 Primary Care Physician  Prieto Novak MD     Reason for Consultation: brbpr    HPI    43 yo M with CAD, CHF, COPD, DM2, ESRD on HD, MGUS here for rectal bleeding.    Bleeding only occurs with BM.    No BM or brbpr overnight.    Hgb baseline 8-0-at baseline.    Recent admission earlier August for same. EGD and colonoscopy reportedly normal. Plan for outpatient capsule endoscopy.    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 (HCC)    Chronic non-seasonal allergic rhinitis    Chronic sinusitis, unspecified location    ADHD (attention deficit hyperactivity disorder), inattentive type    Bipolar depression (HCC)    Essential hypertension    Mild persistent asthma without complication (HCC)    CKD (chronic kidney disease) stage 3, GFR 30-59 ml/min (HCC)    Self-inflicted injury    Bipolar disorder in partial remission, most recent episode unspecified type (HCC)    Family history of prostate cancer    Fatigue, unspecified type    Alkaline phosphatase elevation    Hyperlipidemia, mixed    Low serum HDL    Spinal stenosis of lumbar region with neurogenic claudication    Prolapsed lumbar disc    Status post lumbar surgery    Cauda equina syndrome (HCC)    Lumbar disc prolapse with compression radiculopathy    Syncope and collapse    Elevated troponin    Hypokalemia    Orthostatic hypotension    Hypertension, unspecified type    Weight loss    Chest pain, unspecified type    History of mitral valve stenosis    Acute pericarditis, unspecified type (HCC)    Cervical radiculopathy    Elevated blood protein    IgG monoclonal protein disorder    MGUS (monoclonal gammopathy of unknown significance)    Hypertensive urgency    Bipolar 2 disorder (HCC)    Employment problem    Stress    Anxiety disorder, unspecified type     Weakness of left lower extremity    Rectal bleeding    Paresthesia of foot, bilateral    Obesity (BMI 30-39.9)    TIA (transient ischemic attack)    TMJ dysfunction    Inverted papilloma of nasal cavity    Type 2 diabetes mellitus with stage 3b chronic kidney disease, without long-term current use of insulin (HCC)    Acute kidney injury (HCC)    Metabolic acidosis    Hyperglycemia    Chronic kidney disease, unspecified CKD stage    Abdominal distension    SOB (shortness of breath)    Hypertensive crisis    Acute on chronic congestive heart failure, unspecified heart failure type (HCC)    Acute congestive heart failure (HCC)    Acute congestive heart failure, unspecified heart failure type (HCC)    Acute on chronic diastolic congestive heart failure (HCC)    Stage 4 chronic kidney disease (HCC)    ROBERT (acute kidney injury) ([facility])    Abdominal pain, left lower quadrant    Hypertensive emergency    Acute chest pain    Acute on chronic renal insufficiency    Chronic abdominal pain    Nonintractable headache, unspecified chronicity pattern, unspecified headache type    Chronic right shoulder pain    HCAP (healthcare-associated pneumonia)    Acute intractable headache, unspecified headache type    ESRD (end stage renal disease) on dialysis ([facility])    Diarrhea, unspecified type    Primary hypertension    Dyspnea, unspecified type    Abdominal pain, acute    ESRD on dialysis (HCC)    Chronic renal failure, unspecified CKD stage    Fluid overload    ESRD needing dialysis (HCC)    COVID-19 virus infection    Hypotension, unspecified hypotension type    Anemia    Acute renal failure (ARF) (HCC)    Neck pain    Acute nonintractable headache, unspecified headache type    GI bleed    Gastrointestinal hemorrhage, unspecified gastrointestinal hemorrhage type    Chronic kidney disease with end stage renal failure on dialysis (HCC)    Lower abdominal pain    ESRD (end stage renal disease) ([facility])    Resistant hypertension    Community  acquired pneumonia of right lower lobe of lung    Acute renal failure with acute renal cortical necrosis superimposed on stage 4 chronic kidney disease (HCC)    Acute renal failure, unspecified acute renal failure type (HCC)    Hypervolemia, unspecified hypervolemia type    End stage renal disease on dialysis (HCC)    Acute respiratory failure with hypoxia (HCC)       PATIENT HISTORY:     Past Medical History:    Asthma (HCC)    Attention deficit hyperactivity disorder (ADHD)    Back problem    Bipolar 1 disorder (HCC)    CKD (chronic kidney disease) stage 3, GFR 30-59 ml/min ([facility])    Dr Meeks    Congestive heart disease ([facility])    COPD (chronic obstructive pulmonary disease) ([facility])    Coronary atherosclerosis    Deep vein thrombosis ([facility])    at age 17 R/T cast    Depression    Diabetes ([facility])    Essential hypertension    3/21 echo: severe concentric LVH with normal EF and no MR or pHTN    Extrinsic asthma, unspecified    Heart attack (HCC)    2016- angiogram- no intervention    Heart valve disease    mitral valve repair in 1994/    High blood pressure    High cholesterol    History of blood transfusion    History of mitral valve repair    Hyperlipidemia    Low back pain    tight and stiff after sweeping and mopping    LVH (left ventricular hypertrophy)    Migraines    Mixed hyperlipidemia     HDL 38 LDL 97 VLDL 57     Monoclonal gammopathy    IgG kappa     MVP (mitral valve prolapse)    Repair 1994 at [place]; echoes as recently as 3/21 show mild or trivial MR and no stenosis    Neuropathy    Osteoarthritis    hip ,knees    Pneumonia due to organism    Pulmonary embolism (HCC)    Renal disorder    Stroke (HCC)    TIA (transient ischemic attack)    Initial history of left-sided weakness and slurred speech. (+) cocaine. MRI of the brain, CT angiogram of the head and neck, and 2D echo are all unremarkable.     TMJ (dislocation of temporomandibular joint)    Troponin level elevated    Trop 60 60 47 with TIA and  no CP: Lexiscan negative with EF 51      Past Surgical History:   Procedure Laterality Date    Av fistula revision, open Left     Colonoscopy N/A 03/26/2023    Procedure: COLONOSCOPY;  Surgeon: Heath Vu MD;  Location:  ENDOSCOPY    Colonoscopy N/A 12/30/2023    Procedure: COLONOSCOPY with cold snare polypectomy and forcep polypectomy;  Surgeon: Ousmane Suarez MD;  Location:  ENDOSCOPY    Colonoscopy & polypectomy  2019    Egd  2019    Duodenitis. Biopsied. EUS for weight loss was negative    Heart surgery      Hernia surgery  08/17/2022    Dr Barnes    Laminectomy,>2 sgmt,lumbar  09/06/2018    L4-L5 Decomp Discectomy ROEM L4-L5    Mitralplasty w cp bypass  1994    [facility]: Repair    Repair rotator cuff,chronic Left     torn and had a ruptured bicep    Valve repair  1994    mitral valve      Family History   Problem Relation Age of Onset    Hypertension Father     Alcohol and Other Disorders Associated Father     Substance Abuse Father         cocaine    Dementia Father     Cancer Father         lung    Diabetes Mother     Cancer Mother         multiple myeloma    Hypertension Mother     Anxiety Maternal Aunt     Depression Maternal Aunt     Anxiety Maternal Aunt     Depression Maternal Aunt     Bipolar Disorder Maternal Aunt     Diabetes Maternal Grandmother     Hypertension Maternal Grandmother     Cancer Maternal Grandfather         stomach cancer    Diabetes Maternal Grandfather     Hypertension Maternal Grandfather     Alcohol and Other Disorders Associated Maternal Grandfather     Hypertension Paternal Grandmother     Hypertension Paternal Grandfather     Cancer Sister         uterine and ovarian    Hypertension Sister     Cancer Maternal Uncle         lung    Cancer Paternal Aunt         throat      Social History     Socioeconomic History    Marital status:    Tobacco Use    Smoking status: Former     Current packs/day: 0.00     Average packs/day: 1 pack/day for 27.0 years (27.0 ttl pk-yrs)      Types: Cigarettes     Start date: 1995     Quit date: 2022     Years since quittin.5     Passive exposure: Never    Smokeless tobacco: Never   Vaping Use    Vaping status: Never Used   Substance and Sexual Activity    Alcohol use: No    Drug use: No     Social Determinants of Health     Financial Resource Strain: Medium Risk (2024)    Received from [facility]    Overall Financial Resource Strain (CARDIA)     Difficulty of Paying Living Expenses: Somewhat hard   Food Insecurity: No Food Insecurity (2024)    Food Insecurity     Food Insecurity: Never true   Transportation Needs: No Transportation Needs (2024)    Transportation Needs     Lack of Transportation: No   Physical Activity: Inactive (2024)    Received from [facility]    Exercise Vital Sign     Days of Exercise per Week: 0 days     Minutes of Exercise per Session: 0 min   Stress: Stress Concern Present (2024)    Received from [facility]    [employer] - Occupational Stress Questionnaire     Feeling of Stress : Rather much   Social Connections: Unknown (2024)    Received from [facility]    Social Connection and Isolation Panel [NHANES]     Frequency of Communication with Friends and Family: More than three times a week     Frequency of Social Gatherings with Friends and Family: More than three times a week     Attends [employer] Services: Never     Active Member of Clubs or Organizations: No     Attends Club or Organization Meetings: Never     Marital Status: Patient unable to answer   Housing Stability: Low Risk  (2024)    Housing Stability     Housing Instability: No        Allergies;  Allergies   Allergen Reactions    Hydrochlorothiazide RASH and HIVES        Medications:    Current Facility-Administered Medications:     heparin (Porcine) 5000 UNIT/ML injection 5,000 Units, 5,000 Units, Subcutaneous, Q8H MARTHA    acetaminophen (Tylenol Extra  Strength) tab 500 mg, 500 mg, Oral, Q4H PRN    acetaminophen (Tylenol) tab 650 mg, 650 mg, Oral, Q4H PRN **OR** HYDROcodone-acetaminophen (Norco) 5-325 MG per tab 1 tablet, 1 tablet, Oral, Q4H PRN **OR** HYDROcodone-acetaminophen (Norco) 5-325 MG per tab 2 tablet, 2 tablet, Oral, Q4H PRN    melatonin tab 3 mg, 3 mg, Oral, Nightly PRN    ondansetron (Zofran) 4 MG/2ML injection 4 mg, 4 mg, Intravenous, Q6H PRN    prochlorperazine (Compazine) 10 MG/2ML injection 5 mg, 5 mg, Intravenous, Q8H PRN    ARIPiprazole (Abilify) tab 5 mg, 5 mg, Oral, Daily    buPROPion ER (Wellbutrin XL) 24 hr tab 150 mg, 150 mg, Oral, Daily    carvedilol (Coreg) tab 25 mg, 25 mg, Oral, BID with meals    cloNIDine (Catapres) tab 0.1 mg, 0.1 mg, Oral, Daily with food    fenofibrate micronized (Lofibra) cap 134 mg, 134 mg, Oral, Nightly    FLUoxetine (PROzac) cap 40 mg, 40 mg, Oral, Daily    isosorbide mononitrate ER (Imdur) 24 hr tab 30 mg, 30 mg, Oral, Daily    lamoTRIgine (LaMICtal) tab 150 mg, 150 mg, Oral, Daily    NIFEdipine ER (Procardia-XL) 24 hr tab 60 mg, 60 mg, Oral, BID    losartan (Cozaar) tab 100 mg, 100 mg, Oral, Daily    amphetamine-dextroamphetamine (Adderall) tab 10 mg, 10 mg, Oral, BID AC    sevelamer carbonate (Renvela) tab 800 mg, 800 mg, Oral, TID CC    ipratropium-albuterol (Duoneb) 0.5-2.5 (3) MG/3ML inhalation solution 3 mL, 3 mL, Nebulization, Q6H PRN    sodium chloride 0.9 % IV bolus 100 mL, 100 mL, Intravenous, Q30 Min PRN **AND** albumin human (Albumin) 25% injection 25 g, 25 g, Intravenous, PRN Dialysis    HYDROmorphone (Dilaudid) 1 MG/ML injection 0.1 mg, 0.1 mg, Intravenous, Q2H PRN **OR** HYDROmorphone (Dilaudid) 1 MG/ML injection 0.2 mg, 0.2 mg, Intravenous, Q2H PRN **OR** HYDROmorphone (Dilaudid) 1 MG/ML injection 0.4 mg, 0.4 mg, Intravenous, Q2H PRN     REVIEW OF SYSTEMS:   10 point ros reviewed. Pertinent positive per HPI.        EXAM:   BP (!) 151/114 (BP Location: Left arm)   Pulse 74   Temp 98.2 °F (36.8  °C) (Axillary)   Resp (!) 27   Ht 6' 2\" (1.88 m)   Wt 258 lb 13.1 oz (117.4 kg)   SpO2 95%   BMI 33.23 kg/m²  Body mass index is 33.23 kg/m².  GENERAL: Well developed, well nourished, in no obvious distress.   CV: RRR  PULM: CTAB  ABDOMEN: Bowel sounds normoactive. Soft, no organomegaly or masses appreciated. Nontender.   EXTREMITIES: No peripheral edema appreciated.   NEURO: Alert         LAB/IMAGING RESULTS:     Lab Results   Component Value Date    WBC 7.8 08/30/2024    HGB 8.7 08/30/2024    HCT 27.5 08/30/2024    .0 08/30/2024     Lab Results   Component Value Date    WBC 7.8 08/30/2024    HGB 8.7 08/30/2024    HCT 27.5 08/30/2024    .0 08/30/2024    CREATSERUM 10.44 08/30/2024     08/30/2024     08/30/2024    K 5.6 08/30/2024     08/30/2024    CO2 25.0 08/30/2024    GLU 90 08/30/2024    CA 8.4 08/30/2024    ALB 4.3 08/30/2024    ALKPHO 99 08/30/2024    BILT 0.3 08/30/2024    TP 7.4 08/30/2024    AST 31 08/30/2024    ALT 23 08/30/2024    PGLU 107 08/31/2024         ASSESSMENT AND PLAN:   Hematochezia  Chronic anemia    Hgb stable. No bleeding o/n. EGD and colonoscopy reportedly normal earlier this month. Suspect anemia is 2/2 MGUS and chronic kidney disease and bleeding due to internal hemorrhoids.    - regular diet  - discharge, outpatient capsule endoscopy and f/u with Dr. Heath Vu  - anusol suppository bid prn for bleeding    Will sign off at this time.    Chung Alfaro MD  8/31/2024  9:22 AM

## 2024-09-01 LAB
ALBUMIN SERPL-MCNC: 4.2 G/DL (ref 3.2–4.8)
ANION GAP SERPL CALC-SCNC: 12 MMOL/L (ref 0–18)
BASOPHILS # BLD AUTO: 0.06 X10(3) UL (ref 0–0.2)
BASOPHILS NFR BLD AUTO: 1.1 %
BUN BLD-MCNC: 58 MG/DL (ref 9–23)
CALCIUM BLD-MCNC: 8.3 MG/DL (ref 8.7–10.4)
CHLORIDE SERPL-SCNC: 104 MMOL/L (ref 98–112)
CO2 SERPL-SCNC: 25 MMOL/L (ref 21–32)
CREAT BLD-MCNC: 7.38 MG/DL
EGFRCR SERPLBLD CKD-EPI 2021: 9 ML/MIN/1.73M2 (ref 60–?)
EOSINOPHIL # BLD AUTO: 0.31 X10(3) UL (ref 0–0.7)
EOSINOPHIL NFR BLD AUTO: 5.5 %
ERYTHROCYTE [DISTWIDTH] IN BLOOD BY AUTOMATED COUNT: 16.4 %
GLUCOSE BLD-MCNC: 112 MG/DL (ref 70–99)
GLUCOSE BLD-MCNC: 127 MG/DL (ref 70–99)
GLUCOSE BLD-MCNC: 133 MG/DL (ref 70–99)
GLUCOSE BLD-MCNC: 138 MG/DL (ref 70–99)
GLUCOSE BLD-MCNC: 88 MG/DL (ref 70–99)
HCT VFR BLD AUTO: 28.3 %
HGB BLD-MCNC: 9.2 G/DL
IMM GRANULOCYTES # BLD AUTO: 0.01 X10(3) UL (ref 0–1)
IMM GRANULOCYTES NFR BLD: 0.2 %
LYMPHOCYTES # BLD AUTO: 1.03 X10(3) UL (ref 1–4)
LYMPHOCYTES NFR BLD AUTO: 18.4 %
MAGNESIUM SERPL-MCNC: 2.4 MG/DL (ref 1.6–2.6)
MCH RBC QN AUTO: 32.3 PG (ref 26–34)
MCHC RBC AUTO-ENTMCNC: 32.5 G/DL (ref 31–37)
MCV RBC AUTO: 99.3 FL
MONOCYTES # BLD AUTO: 0.64 X10(3) UL (ref 0.1–1)
MONOCYTES NFR BLD AUTO: 11.4 %
NEUTROPHILS # BLD AUTO: 3.56 X10 (3) UL (ref 1.5–7.7)
NEUTROPHILS # BLD AUTO: 3.56 X10(3) UL (ref 1.5–7.7)
NEUTROPHILS NFR BLD AUTO: 63.4 %
OSMOLALITY SERPL CALC.SUM OF ELEC: 310 MOSM/KG (ref 275–295)
PHOSPHATE SERPL-MCNC: 7.5 MG/DL (ref 2.4–5.1)
PLATELET # BLD AUTO: 230 10(3)UL (ref 150–450)
POTASSIUM SERPL-SCNC: 4 MMOL/L (ref 3.5–5.1)
RBC # BLD AUTO: 2.85 X10(6)UL
SODIUM SERPL-SCNC: 141 MMOL/L (ref 136–145)
WBC # BLD AUTO: 5.6 X10(3) UL (ref 4–11)

## 2024-09-01 PROCEDURE — 85025 COMPLETE CBC W/AUTO DIFF WBC: CPT | Performed by: INTERNAL MEDICINE

## 2024-09-01 PROCEDURE — 82962 GLUCOSE BLOOD TEST: CPT

## 2024-09-01 PROCEDURE — 80069 RENAL FUNCTION PANEL: CPT | Performed by: INTERNAL MEDICINE

## 2024-09-01 PROCEDURE — 83735 ASSAY OF MAGNESIUM: CPT | Performed by: INTERNAL MEDICINE

## 2024-09-01 RX ORDER — ALBUMIN (HUMAN) 12.5 G/50ML
25 SOLUTION INTRAVENOUS
Status: DISCONTINUED | OUTPATIENT
Start: 2024-09-01 | End: 2024-09-02

## 2024-09-01 RX ORDER — SEVELAMER CARBONATE 800 MG/1
1600 TABLET, FILM COATED ORAL
Status: DISCONTINUED | OUTPATIENT
Start: 2024-09-01 | End: 2024-09-02

## 2024-09-01 NOTE — PROGRESS NOTES
[facility]   part of formerly [facility]    Nephrology Progress Note    Godwin Fonseca Attending:  Yuriy Forrest MD       SUBJECTIVE:     Tolerated HD with 4L UF yesterday  Breathing better and BP better  No leg swelling  Remains on supplemental O2    PHYSICAL EXAM:     Vital Signs: /67 (BP Location: Right arm)   Pulse 79   Temp 97.7 °F (36.5 °C) (Oral)   Resp 19   Ht 188 cm (6' 2\")   Wt 257 lb 8 oz (116.8 kg)   SpO2 92%   BMI 33.06 kg/m²   Temp (24hrs), Av °F (36.7 °C), Min:97.7 °F (36.5 °C), Max:98.1 °F (36.7 °C)       Intake/Output Summary (Last 24 hours) at 2024 1055  Last data filed at 2024 0900  Gross per 24 hour   Intake 240 ml   Output 5100 ml   Net -4860 ml     Wt Readings from Last 3 Encounters:   24 257 lb 8 oz (116.8 kg)   24 250 lb (113.4 kg)   24 240 lb (108.9 kg)       General: pleasant, well appearing  Skin: no visible rashes  HEENT: NCAT  Cardiac: Regular rate and rhythm, no murmur/gallop or rub  Lungs: CTAB, no wheeze, no rale, no rhonchi  Abdomen: Soft, NTND  Extremities: warm, well perfused, no leg edema  Neurologic/Psych: mentating well  RI CVC       LABORATORY DATA:     Lab Results   Component Value Date     (H) 2024    BUN 58 (H) 2024    BUNCREA 13.0 2022    CREATSERUM 7.38 (H) 2024    ANIONGAP 12 2024    GFR 59 (L) 2018    GFRNAA 30 (L) 2022    GFRAA 35 (L) 2022    CA 8.3 (L) 2024    OSMOCALC 310 (H) 2024    ALKPHO 99 2024    AST 31 2024    ALT 23 2024    BILT 0.3 2024    TP 7.4 2024    ALB 4.2 2024    GLOBULIN 3.1 2024     2024    K 4.0 2024     2024    CO2 25.0 2024     Lab Results   Component Value Date    WBC 5.6 2024    RBC 2.85 (L) 2024    HGB 9.2 (L) 2024    HCT 28.3 (L) 2024    .0 2024    MPV 11.5 2012    MCV 99.3 2024    MCH 32.3 2024     MCHC 32.5 09/01/2024    RDW 16.4 09/01/2024    NEPRELIM 3.56 09/01/2024    NEPERCENT 63.4 09/01/2024    LYPERCENT 18.4 09/01/2024    MOPERCENT 11.4 09/01/2024    EOPERCENT 5.5 09/01/2024    BAPERCENT 1.1 09/01/2024    NE 3.56 09/01/2024    LYMABS 1.03 09/01/2024    MOABSO 0.64 09/01/2024    EOABSO 0.31 09/01/2024    BAABSO 0.06 09/01/2024     Lab Results   Component Value Date    MALBP 167.00 05/24/2023    CREUR 142.00 05/24/2023    CREUR 142.00 05/24/2023     Lab Results   Component Value Date    COLORUR Yellow 01/03/2024    CLARITY Clear 01/03/2024    SPECGRAVITY 1.025 01/03/2024    GLUUR Negative 01/03/2024    BILUR Negative 01/03/2024    KETUR Trace (A) 01/03/2024    BLOODURINE Negative 01/03/2024    PHURINE 5.5 01/03/2024    PROUR >=300 (A) 01/03/2024    UROBILINOGEN 0.2 01/03/2024    NITRITE Negative 01/03/2024    LEUUR Negative 01/03/2024    WBCUR 6-10 (A) 01/03/2024    RBCUR 0-2 01/03/2024    EPIUR Few (A) 01/03/2024    BACUR Rare (A) 01/03/2024    CAOXUR Occasional (A) 04/20/2018    HYLUR Present (A) 05/14/2019         IMAGING:     Reviewed.    MEDICATIONS:       Current Facility-Administered Medications   Medication Dose Route Frequency    heparin (Porcine) 5000 UNIT/ML injection 5,000 Units  5,000 Units Subcutaneous Q8H MARTHA    acetaminophen (Tylenol Extra Strength) tab 500 mg  500 mg Oral Q4H PRN    acetaminophen (Tylenol) tab 650 mg  650 mg Oral Q4H PRN    Or    HYDROcodone-acetaminophen (Norco) 5-325 MG per tab 1 tablet  1 tablet Oral Q4H PRN    Or    HYDROcodone-acetaminophen (Norco) 5-325 MG per tab 2 tablet  2 tablet Oral Q4H PRN    melatonin tab 3 mg  3 mg Oral Nightly PRN    ondansetron (Zofran) 4 MG/2ML injection 4 mg  4 mg Intravenous Q6H PRN    prochlorperazine (Compazine) 10 MG/2ML injection 5 mg  5 mg Intravenous Q8H PRN    ARIPiprazole (Abilify) tab 5 mg  5 mg Oral Daily    buPROPion ER (Wellbutrin XL) 24 hr tab 150 mg  150 mg Oral Daily    carvedilol (Coreg) tab 25 mg  25 mg Oral BID with  meals    fenofibrate micronized (Lofibra) cap 134 mg  134 mg Oral Nightly    FLUoxetine (PROzac) cap 40 mg  40 mg Oral Daily    isosorbide mononitrate ER (Imdur) 24 hr tab 30 mg  30 mg Oral Daily    lamoTRIgine (LaMICtal) tab 150 mg  150 mg Oral Daily    NIFEdipine ER (Procardia-XL) 24 hr tab 60 mg  60 mg Oral BID    losartan (Cozaar) tab 100 mg  100 mg Oral Daily    amphetamine-dextroamphetamine (Adderall) tab 10 mg  10 mg Oral BID AC    sevelamer carbonate (Renvela) tab 800 mg  800 mg Oral TID CC    ipratropium-albuterol (Duoneb) 0.5-2.5 (3) MG/3ML inhalation solution 3 mL  3 mL Nebulization Q6H PRN    sodium chloride 0.9 % IV bolus 100 mL  100 mL Intravenous Q30 Min PRN    And    albumin human (Albumin) 25% injection 25 g  25 g Intravenous PRN Dialysis    piperacillin-tazobactam (Zosyn) 3.375 g in dextrose 5% 100 mL IVPB-ADDV  3.375 g Intravenous Q12H    hydrocortisone (Anusol-HC) 25 MG rectal suppository 25 mg  25 mg Rectal BID    cloNIDine (Catapres) tab 0.1 mg  0.1 mg Oral BID    HYDROmorphone (Dilaudid) 1 MG/ML injection 0.1 mg  0.1 mg Intravenous Q6H PRN    Or    HYDROmorphone (Dilaudid) 1 MG/ML injection 0.2 mg  0.2 mg Intravenous Q6H PRN    Or    HYDROmorphone (Dilaudid) 1 MG/ML injection 0.4 mg  0.4 mg Intravenous Q6H PRN    glucose (Dex4) 15 GM/59ML oral liquid 15 g  15 g Oral Q15 Min PRN    Or    glucose (Glutose) 40% oral gel 15 g  15 g Oral Q15 Min PRN    Or    glucose-vitamin C (Dex-4) chewable tab 4 tablet  4 tablet Oral Q15 Min PRN    Or    dextrose 50% injection 50 mL  50 mL Intravenous Q15 Min PRN    Or    glucose (Dex4) 15 GM/59ML oral liquid 30 g  30 g Oral Q15 Min PRN    Or    glucose (Glutose) 40% oral gel 30 g  30 g Oral Q15 Min PRN    Or    glucose-vitamin C (Dex-4) chewable tab 8 tablet  8 tablet Oral Q15 Min PRN    insulin aspart (NovoLOG) 100 Units/mL FlexPen 1-5 Units  1-5 Units Subcutaneous TID AC and HS       ASSESSMENT/PLAN:     43 yo M with history of ESRD on iHD MWF via RIJ CVC,  hyperaldosteronism, DM, biopolar disorder presented with abd pain, BRBPR, sob.     ESRD:  -- HD tomorrow per outpatient schedule, 3-4L UF as tolerated     Anemia in ESRD:  -- OWEN with HD tomorrow if BP acccepable     MBD:  -- hyperphosphatemic -> increase sevelamer     Hyperkalemia:  -- resolved     HTN:  -- restarted coreg 35 mg BID, imdur 30 mg/d, losartan 100 mg/d, nifedipine XL 60 mg/d  -- change clonidine from daily dosing to BID dosing to avoid rebound  -- consider spironolactone pending BP trend     Hypoxia:  -- CXR with pleural effusions and interstitial edema  -- UF per above     GI bleeding:  -- plan for outpatient capsule endoscopy per notes     Will follow.    Thank you for allowing me to participate in the care of this patient. Please do not hesitate to call with questions or concerns.        Lenka Bond MD  [facility] Nephrology  [address] [place] [id number]    9/1/2024  10:55 AM

## 2024-09-01 NOTE — PROGRESS NOTES
[facility] Hospitalist Progress Note     PCP: Prieto Novak MD    Chief Complaint: follow-up   Follow up for: The primary encounter diagnosis was Hypervolemia, unspecified hypervolemia type. Diagnoses of End stage renal disease on dialysis (HCC), Gastrointestinal hemorrhage, unspecified gastrointestinal hemorrhage type, and Acute respiratory failure with hypoxia (HCC) were also pertinent to this visit.    Overnight/Interim Events:      SUBJECTIVE:  On 5L. Napping. Pain in abd still. Dec appetite for a few months. Denies any missed HD.     OBJECTIVE:  Temp:  [97.7 °F (36.5 °C)-98.1 °F (36.7 °C)] 98 °F (36.7 °C)  Pulse:  [72-84] 79  Resp:  [16-20] 19  BP: (114-144)/(64-96) 114/82  SpO2:  [90 %-95 %] 95 %    Intake/Output:    Intake/Output Summary (Last 24 hours) at 9/1/2024 1527  Last data filed at 9/1/2024 1303  Gross per 24 hour   Intake 240 ml   Output 1100 ml   Net -860 ml       Last 3 Weights   09/01/24 0425 257 lb 8 oz (116.8 kg)   08/31/24 0247 258 lb 13.1 oz (117.4 kg)   08/31/24 0230 258 lb 13.1 oz (117.4 kg)   08/30/24 2032 250 lb (113.4 kg)   08/30/24 0829 250 lb (113.4 kg)   08/11/24 1701 240 lb (108.9 kg)       Exam    General: Alert, no distress, appears stated age.     Head:  Normocephalic, without obvious abnormality, atraumatic.   Eyes:  Sclera anicteric, EOMs intact.    Nose: Nares normal,  Mucosa normal    Throat: Lips normal   Neck: Supple, symmetrical, trachea midline   Lungs:   Clear to auscultation bilaterally. Normal effort   Chest wall:  No tenderness or deformity   Heart:  Regular rate and rhythm, S1, S2 normal, no murmur, rub or gallop appreciated   Abdomen:   Soft, NT/ND, Bowel sounds normal. No masses,  No organomegaly.    Extremities: Extremities normal, atraumatic, no cyanosis or LE edema.   Skin: Skin color, texture, turgor normal. No rashes or lesions.    Neurologic: Moving all extremities spontaneously, no focal deficit appreciated      Data Review:       Labs:     Recent Labs   Lab  08/30/24  0840 08/30/24  2310 08/31/24  1017 09/01/24  0719   WBC 9.1 7.8 11.2* 5.6   HGB 8.1* 8.7* 9.3* 9.2*   .4* 101.1* 101.1* 99.3   .0 256.0 261.0 230.0   INR 1.05  --   --   --        Recent Labs   Lab 08/30/24  0840 08/30/24 2310 08/31/24  1017 09/01/24  0719    141 142 141   K 4.7 5.6* 4.0 4.0    110 106 104   CO2 19.0* 25.0 24.0 25.0   * 102* 67* 58*   CREATSERUM 11.40* 10.44* 6.77* 7.38*   CA 8.0* 8.4* 8.7 8.3*   MG 2.8*  --   --  2.4   PHOS  --   --   --  7.5*   * 90 82 133*       Recent Labs   Lab 08/30/24  0840 08/30/24 2310 09/01/24  0719   ALT 25 23  --    AST 17 31  --    ALB 3.5 4.3 4.2       Recent Labs   Lab 08/31/24  0354 08/31/24  2119 09/01/24  0617 09/01/24  1303   PGLU 107* 98 127* 112*       No results for input(s): \"TROP\" in the last 168 hours.      Meds:      sevelamer carbonate  1,600 mg Oral TID CC    heparin  5,000 Units Subcutaneous Q8H MARTHA    ARIPiprazole  5 mg Oral Daily    buPROPion ER  150 mg Oral Daily    carvedilol  25 mg Oral BID with meals    fenofibrate micronized  134 mg Oral Nightly    FLUoxetine HCl  40 mg Oral Daily    isosorbide mononitrate ER  30 mg Oral Daily    lamoTRIgine  150 mg Oral Daily    NIFEdipine ER  60 mg Oral BID    losartan  100 mg Oral Daily    amphetamine-dextroamphetamine  10 mg Oral BID AC    piperacillin-tazobactam  3.375 g Intravenous Q12H    hydrocortisone  25 mg Rectal BID    cloNIDine  0.1 mg Oral BID    insulin aspart  1-5 Units Subcutaneous TID AC and HS         sodium chloride **AND** albumin human    acetaminophen    acetaminophen **OR** HYDROcodone-acetaminophen **OR** HYDROcodone-acetaminophen    melatonin    ondansetron    prochlorperazine    ipratropium-albuterol    sodium chloride **AND** albumin human    HYDROmorphone **OR** HYDROmorphone **OR** HYDROmorphone    glucose **OR** glucose **OR** glucose-vitamin C **OR** dextrose **OR** glucose **OR** glucose **OR** glucose-vitamin C        Assessment/Plan:      44 year old male with PMH significant for ESRD on HD, bipolar, depression, DM 2, HTN, DL, CAD, COPD, HFpEF, chronic abdominal pain presents to the ED c sob and GIB.      Dark stool  - apprec GI recs  - hgb stable, EGD and colonoscopy reportedly normal earlier this month  - outpatient capsule endoscopy and f/u with Dr. Heath Vu   -anusol suppository bid prn for bleeding   - follow sxs and hgb      Acute respiratory failure  -likely 2/2 pulm edema  - apprec pulm recs  - HD  - empiric zosyn, repeat CXR tmrw, if improved, stop abx  - consider CTA, tx ICU if worsens   - was on BIPAP, now 5L     ESRD on HD  -HD T/TH/Sa  -nephrology consulted     Chronic pain  Likely drug use dependance  -Follows with pain clinic outpatient  -Resume home pain medication.  -minimize iv pain meds     Anemia  -chronic - hgb 8.7 to 9.3 to 9.2   -hx of GI bleed     HTN  DL  CAD  HFpEF  -resume home meds     DM2  -hold po meds  -ISS +accuchecks     COPD  -no evidence of acute exacerbation  -resume home inhalers     MDD  Bipolar   -resume home meds     Leukocytosis  -follow, 7.8 to 11.2 to 5.6    Dispo: CTU    Questions/concerns were discussed with patient by bedside.     Total Time spent with patient and coordinating care:  55 minutes    Yuriy Forrest MD  [facility] Hospitalist  [phone]  9/1/2024  3:27 PM

## 2024-09-01 NOTE — PLAN OF CARE
Assumed patient at 1930. Alert and oriented x 4 on 5L nasal canula. Lung sounds diminished bilaterally. Normal Sinus on tele. Abdomen tender, with pain. Continent of bowel and bladder. Up standby. Patient updated on plan of care and verbalized understanding.     Plan of care: suppositories, wean 02 as tolerated.       Problem: Patient/Family Goals  Goal: Patient/Family Long Term Goal  Description: Patient's Long Term Goal:     Interventions:  - See additional Care Plan goals for specific interventions  Outcome: Progressing  Goal: Patient/Family Short Term Goal  Description: Patient's Short Term Goal:     Interventions:   - See additional Care Plan goals for specific interventions  Outcome: Progressing     Problem: PAIN - ADULT  Goal: Verbalizes/displays adequate comfort level or patient's stated pain goal  Description: INTERVENTIONS:  - Encourage pt to monitor pain and request assistance  - Assess pain using appropriate pain scale  - Administer analgesics based on type and severity of pain and evaluate response  - Implement non-pharmacological measures as appropriate and evaluate response  - Consider cultural and social influences on pain and pain management  - Manage/alleviate anxiety  - Utilize distraction and/or relaxation techniques  - Monitor for opioid side effects  - Notify MD/LIP if interventions unsuccessful or patient reports new pain  - Anticipate increased pain with activity and pre-medicate as appropriate  Outcome: Progressing     Problem: RISK FOR INFECTION - ADULT  Goal: Absence of fever/infection during anticipated neutropenic period  Description: INTERVENTIONS  - Monitor WBC  - Administer growth factors as ordered  - Implement neutropenic guidelines  Outcome: Progressing     Problem: SAFETY ADULT - FALL  Goal: Free from fall injury  Description: INTERVENTIONS:  - Assess pt frequently for physical needs  - Identify cognitive and physical deficits and behaviors that affect risk of falls.  - [place]  fall precautions as indicated by assessment.  - Educate pt/family on patient safety including physical limitations  - Instruct pt to call for assistance with activity based on assessment  - Modify environment to reduce risk of injury  - Provide assistive devices as appropriate  - Consider OT/PT consult to assist with strengthening/mobility  - Encourage toileting schedule  Outcome: Progressing     Problem: DISCHARGE PLANNING  Goal: Discharge to home or other facility with appropriate resources  Description: INTERVENTIONS:  - Identify barriers to discharge w/pt and caregiver  - Include patient/family/discharge partner in discharge planning  - Arrange for needed discharge resources and transportation as appropriate  - Identify discharge learning needs (meds, wound care, etc)  - Arrange for interpreters to assist at discharge as needed  - Consider post-discharge preferences of patient/family/discharge partner  - Complete POLST form as appropriate  - Assess patient's ability to be responsible for managing their own health  - Refer to Case Management Department for coordinating discharge planning if the patient needs post-hospital services based on physician/LIP order or complex needs related to functional status, cognitive ability or social support system  Outcome: Progressing

## 2024-09-01 NOTE — PROGRESS NOTES
[facility]    Godwin Fonseca Patient Status:  Inpatient    1980 MRN [id number]   Location [facility] 2NE-A Attending Yuriy Forrest MD   Hosp Day # 1 PCP Prieto Novak MD     Pulm / Critical Care Progress Note     S: much better after dialysis yesterday       Scheduled Medication:   heparin  5,000 Units Subcutaneous Q8H MARTHA    ARIPiprazole  5 mg Oral Daily    buPROPion ER  150 mg Oral Daily    carvedilol  25 mg Oral BID with meals    fenofibrate micronized  134 mg Oral Nightly    FLUoxetine HCl  40 mg Oral Daily    isosorbide mononitrate ER  30 mg Oral Daily    lamoTRIgine  150 mg Oral Daily    NIFEdipine ER  60 mg Oral BID    losartan  100 mg Oral Daily    amphetamine-dextroamphetamine  10 mg Oral BID AC    sevelamer carbonate  800 mg Oral TID CC    piperacillin-tazobactam  3.375 g Intravenous Q12H    hydrocortisone  25 mg Rectal BID    cloNIDine  0.1 mg Oral BID    insulin aspart  1-5 Units Subcutaneous TID AC and HS     Continuous Infusing Medication:  PRN Medication:  acetaminophen    acetaminophen **OR** HYDROcodone-acetaminophen **OR** HYDROcodone-acetaminophen    melatonin    ondansetron    prochlorperazine    ipratropium-albuterol    sodium chloride **AND** albumin human    HYDROmorphone **OR** HYDROmorphone **OR** HYDROmorphone    glucose **OR** glucose **OR** glucose-vitamin C **OR** dextrose **OR** glucose **OR** glucose **OR** glucose-vitamin C       OBJECTIVE:  Vitals:    24 2330 24 0425 24 0728 24 0802   BP: (!) 144/96 133/64  120/67   BP Location: Right arm Radial  Right arm   Pulse: 72 78 84 79   Resp: 16    Temp: 97.9 °F (36.6 °C) 98 °F (36.7 °C)  97.7 °F (36.5 °C)   TempSrc: Oral Oral  Oral   SpO2: 94% 93% 93% 92%   Weight:  257 lb 8 oz (116.8 kg)     Height:            O2: 5L nc       Wt Readings from Last 3 Encounters:   24 257 lb 8 oz (116.8 kg)   24 250 lb (113.4 kg)   24 240 lb (108.9 kg)        I/O last 3 completed  shifts:  In: -   Out: 4800 [Urine:800; Other:4000]  I/O this shift:  In: -   Out: 700 [Urine:700]     Physical Exam:   General: alert, cooperative,  No respiratory distress.   Head: Normocephalic, without obvious abnormality, atraumatic.   Lungs: ctab   Chest wall: No tenderness or deformity.   Heart: Regular rate and rhythm, normal S1S2, no murmur.   Abdomen: soft, non-tender, non-distended, positive BS.   Extremity: no edema     Recent Labs   Lab 08/30/24 2310 08/31/24  1017 09/01/24  0719   WBC 7.8 11.2* 5.6   HGB 8.7* 9.3* 9.2*   HCT 27.5* 28.3* 28.3*   .0 261.0 230.0     Recent Labs   Lab 08/30/24  0840   INR 1.05         Recent Labs   Lab 08/30/24 0840 08/30/24 2310 08/31/24 1017 09/01/24  0719    141 142 141   K 4.7 5.6* 4.0 4.0    110 106 104   CO2 19.0* 25.0 24.0 25.0   * 102* 67* 58*   CREATSERUM 11.40* 10.44* 6.77* 7.38*   * 90 82 133*   CA 8.0* 8.4* 8.7 8.3*   AST 17 31  --   --    ALT 25 23  --   --    ALKPHO 86 99  --   --    BILT 0.3 0.3  --   --    ALB 3.5 4.3  --  4.2   TP 7.1 7.4  --   --        CREATININE (mg/dL)   Date Value   12/14/2012 1.1     Creatinine (mg/dL)   Date Value   09/01/2024 7.38 (H)   08/31/2024 6.77 (H)   08/30/2024 10.44 (H)   03/07/2022 2.87 (H)   10/11/2021 2.54 (H)   08/30/2021 2.72 (H)   ]         ASSESSMENT/PLAN:    Acute resp failure: suspect due to pulm edema. At risk for PE given recent hospitalizations as well as hap; given improvement with dialysis fluid is the most likely explanation   -  started empiric zosyn 8/31  - dialysis per renal   - repeat cxr tomorrow; if notably improved then would stop abx.   Esrd:   - hd per renal   Gi bleed; recurrent rectal bleeding, has had scopes recently without clear etiology   - gi consulted.   - follow hgb, transfuse for < 7   Fen: npo   Dispo: ctu      Anna Waller M.D.  [facility]  Pulmonary / Critical care  9/1/2024  8:32 AM

## 2024-09-01 NOTE — PLAN OF CARE
Assumed pt care at approximately 0730. A&Ox4. 5L NC, pt reports R side abdominal pain, PRN pain medication given as ordered (see MAR). No shortness of breath, vital signs stable, NSR on tele. Voids. Per patient-- no further rectal bleeding or loose stools. Up with standby.      Plan of care: wean O2 as tolerated, CXR, IV abx     Plan of care updated with patient. Questions answered, pt verbalized understanding. Call light is within reach, all needs met at this time.    Problem: PAIN - ADULT  Goal: Verbalizes/displays adequate comfort level or patient's stated pain goal  Description: INTERVENTIONS:  - Encourage pt to monitor pain and request assistance  - Assess pain using appropriate pain scale  - Administer analgesics based on type and severity of pain and evaluate response  - Implement non-pharmacological measures as appropriate and evaluate response  - Consider cultural and social influences on pain and pain management  - Manage/alleviate anxiety  - Utilize distraction and/or relaxation techniques  - Monitor for opioid side effects  - Notify MD/LIP if interventions unsuccessful or patient reports new pain  - Anticipate increased pain with activity and pre-medicate as appropriate  Outcome: Progressing     Problem: RISK FOR INFECTION - ADULT  Goal: Absence of fever/infection during anticipated neutropenic period  Description: INTERVENTIONS  - Monitor WBC  - Administer growth factors as ordered  - Implement neutropenic guidelines  Outcome: Progressing     Problem: DISCHARGE PLANNING  Goal: Discharge to home or other facility with appropriate resources  Description: INTERVENTIONS:  - Identify barriers to discharge w/pt and caregiver  - Include patient/family/discharge partner in discharge planning  - Arrange for needed discharge resources and transportation as appropriate  - Identify discharge learning needs (meds, wound care, etc)  - Arrange for interpreters to assist at discharge as needed  - Consider  post-discharge preferences of patient/family/discharge partner  - Complete POLST form as appropriate  - Assess patient's ability to be responsible for managing their own health  - Refer to Case Management Department for coordinating discharge planning if the patient needs post-hospital services based on physician/LIP order or complex needs related to functional status, cognitive ability or social support system  Outcome: Progressing

## 2024-09-02 ENCOUNTER — APPOINTMENT (OUTPATIENT)
Dept: GENERAL RADIOLOGY | Facility: HOSPITAL | Age: 44
End: 2024-09-02
Attending: INTERNAL MEDICINE
Payer: MEDICARE

## 2024-09-02 VITALS
HEART RATE: 83 BPM | RESPIRATION RATE: 20 BRPM | WEIGHT: 248.5 LBS | TEMPERATURE: 98 F | DIASTOLIC BLOOD PRESSURE: 100 MMHG | BODY MASS INDEX: 31.89 KG/M2 | HEIGHT: 74 IN | OXYGEN SATURATION: 98 % | SYSTOLIC BLOOD PRESSURE: 156 MMHG

## 2024-09-02 LAB
ALBUMIN SERPL-MCNC: 3.8 G/DL (ref 3.2–4.8)
ANION GAP SERPL CALC-SCNC: 11 MMOL/L (ref 0–18)
BASOPHILS # BLD AUTO: 0.05 X10(3) UL (ref 0–0.2)
BASOPHILS NFR BLD AUTO: 1.1 %
BUN BLD-MCNC: 66 MG/DL (ref 9–23)
CALCIUM BLD-MCNC: 8.1 MG/DL (ref 8.7–10.4)
CHLORIDE SERPL-SCNC: 107 MMOL/L (ref 98–112)
CO2 SERPL-SCNC: 23 MMOL/L (ref 21–32)
CREAT BLD-MCNC: 7.76 MG/DL
EGFRCR SERPLBLD CKD-EPI 2021: 8 ML/MIN/1.73M2 (ref 60–?)
EOSINOPHIL # BLD AUTO: 0.31 X10(3) UL (ref 0–0.7)
EOSINOPHIL NFR BLD AUTO: 6.9 %
ERYTHROCYTE [DISTWIDTH] IN BLOOD BY AUTOMATED COUNT: 15.9 %
GLUCOSE BLD-MCNC: 102 MG/DL (ref 70–99)
GLUCOSE BLD-MCNC: 95 MG/DL (ref 70–99)
GLUCOSE BLD-MCNC: 97 MG/DL (ref 70–99)
GLUCOSE BLD-MCNC: 99 MG/DL (ref 70–99)
HBV SURFACE AG SER-ACNC: <0.1 [IU]/L
HBV SURFACE AG SERPL QL IA: NONREACTIVE
HCT VFR BLD AUTO: 24.4 %
HGB BLD-MCNC: 8.3 G/DL
IMM GRANULOCYTES # BLD AUTO: 0.02 X10(3) UL (ref 0–1)
IMM GRANULOCYTES NFR BLD: 0.4 %
LYMPHOCYTES # BLD AUTO: 0.84 X10(3) UL (ref 1–4)
LYMPHOCYTES NFR BLD AUTO: 18.7 %
MCH RBC QN AUTO: 32.9 PG (ref 26–34)
MCHC RBC AUTO-ENTMCNC: 34 G/DL (ref 31–37)
MCV RBC AUTO: 96.8 FL
MONOCYTES # BLD AUTO: 0.49 X10(3) UL (ref 0.1–1)
MONOCYTES NFR BLD AUTO: 10.9 %
NEUTROPHILS # BLD AUTO: 2.78 X10 (3) UL (ref 1.5–7.7)
NEUTROPHILS # BLD AUTO: 2.78 X10(3) UL (ref 1.5–7.7)
NEUTROPHILS NFR BLD AUTO: 62 %
OSMOLALITY SERPL CALC.SUM OF ELEC: 311 MOSM/KG (ref 275–295)
PHOSPHATE SERPL-MCNC: 6.3 MG/DL (ref 2.4–5.1)
PLATELET # BLD AUTO: 194 10(3)UL (ref 150–450)
POTASSIUM SERPL-SCNC: 3.9 MMOL/L (ref 3.5–5.1)
RBC # BLD AUTO: 2.52 X10(6)UL
SODIUM SERPL-SCNC: 141 MMOL/L (ref 136–145)
WBC # BLD AUTO: 4.5 X10(3) UL (ref 4–11)

## 2024-09-02 PROCEDURE — 85025 COMPLETE CBC W/AUTO DIFF WBC: CPT | Performed by: INTERNAL MEDICINE

## 2024-09-02 PROCEDURE — 71046 X-RAY EXAM CHEST 2 VIEWS: CPT | Performed by: INTERNAL MEDICINE

## 2024-09-02 PROCEDURE — 5A1D70Z PERFORMANCE OF URINARY FILTRATION, INTERMITTENT, LESS THAN 6 HOURS PER DAY: ICD-10-PCS | Performed by: INTERNAL MEDICINE

## 2024-09-02 PROCEDURE — 90935 HEMODIALYSIS ONE EVALUATION: CPT | Performed by: INTERNAL MEDICINE

## 2024-09-02 PROCEDURE — 82962 GLUCOSE BLOOD TEST: CPT

## 2024-09-02 PROCEDURE — 80069 RENAL FUNCTION PANEL: CPT | Performed by: INTERNAL MEDICINE

## 2024-09-02 PROCEDURE — 87340 HEPATITIS B SURFACE AG IA: CPT | Performed by: INTERNAL MEDICINE

## 2024-09-02 RX ORDER — HEPARIN SODIUM 1000 [USP'U]/ML
1500 INJECTION, SOLUTION INTRAVENOUS; SUBCUTANEOUS
Status: DISCONTINUED | OUTPATIENT
Start: 2024-09-02 | End: 2024-09-02

## 2024-09-02 RX ORDER — ASCORBIC ACID, THIAMINE, RIBOFLAVIN, NIACINAMIDE, PYRIDOXINE, FOLIC ACID, COBALAMIN, BIOTIN, PANTOTHENIC ACID 100; 1.5; 1.7; 20; 10; 1; 6; 300; 1 MG/1; MG/1; MG/1; MG/1; MG/1; MG/1; UG/1; UG/1; MG/1
1 TABLET, COATED ORAL DAILY
Status: DISCONTINUED | OUTPATIENT
Start: 2024-09-02 | End: 2024-09-02

## 2024-09-02 NOTE — DISCHARGE SUMMARY
[facility] Internal Medicine Discharge Summary   Patient ID:  Godwin Fonseca  [id number]  44 year old  4/12/1980    Admit date: 8/30/2024    Discharge date and time: 9/2/2024     Attending Physician: Yuriy Forrest MD     Primary Care Physician: Prieto Novak MD     Admit Dx: End stage renal disease on dialysis (HCC) [N18.6, Z99.2]  Acute respiratory failure with hypoxia (HCC) [J96.01]  Gastrointestinal hemorrhage, unspecified gastrointestinal hemorrhage type [K92.2]  Hypervolemia, unspecified hypervolemia type [E87.70]    Hospital Discharge Diagnoses:  ARF    Disposition: home    Important follow up:  -PCP in [x] within 7 days [] within 14 days [] other    Heath Vu MD  [address]  [address]  [facility] [address] [phone]    Follow up  capsule endoscopy      Hospital Course:   44 year old male with PMH significant for ESRD on HD, bipolar, depression, DM 2, HTN, DL, CAD, COPD, HFpEF, chronic abdominal pain presents to the ED c sob and GIB.      Dark stool  - apprec GI recs  - hgb stable, EGD and colonoscopy reportedly normal earlier this month  - outpatient capsule endoscopy and f/u with Dr. Heath Vu   -anusol suppository bid prn for bleeding   - follow sxs and hgb      Acute respiratory failure  -likely 2/2 pulm edema  - apprec pulm recs  - HD  - empiric zosyn, repeat CXR noted, stopped abx per pulm   - consider CTA if worsens   - was on BIPAP, then 5L, now RA     ESRD on HD  -HD T/TH/Sa  -nephrology consulted     Chronic pain  Likely drug use dependance  -Follows with pain clinic outpatient  -Resume home pain medication.  -minimize iv pain meds     Anemia  -chronic - hgb 8.7 to 9.3 to 9.2 to 8.3   -hx of GI bleed  - follow as o/p      HTN  DL  CAD  HFpEF  -resume home meds     DM2  -hold po meds  -ISS +accuchecks     COPD  -no evidence of acute exacerbation  -resume home inhalers     MDD  Bipolar   -resume home meds     Leukocytosis  -follow, 7.8 to 11.2 to 5.6     Day of discharge Exam    Vitals:    09/02/24 1220   BP: (!) 137/92   Pulse: 85   Resp: 18   Temp: 97.8 °F (36.6 °C)       Exam on day of discharge:  Feeling lot better. Pain same. No n/v. Sitting up in chair, hasn't walked.     Gen: No acute distress, alert and oriented  CV: RRR, +s1/s2  Lungs: CTAB, good respiratory effort  Abdomen: s/nt/nd  Ext: Moves all 4 extremities, no c/c/e  Neuro: CN Intact, no focal deficits      Discharge meds     Medication List        START taking these medications      hydrocortisone 25 MG Supp  Commonly known as: Anusol-HC  Place 1 suppository (25 mg total) rectally 2 (two) times daily.            CONTINUE taking these medications      ARIPiprazole 5 MG Tabs  Commonly known as: Abilify     buPROPion  MG Tb24  Commonly known as: Wellbutrin XL     carvedilol 25 MG Tabs  Commonly known as: Coreg  Take 1 tablet (25 mg total) by mouth in the morning and 1 tablet (25 mg total) in the evening. Take with meals.     cloNIDine 0.1 MG Tabs  Commonly known as: Catapres     dicyclomine 10 MG Caps  Commonly known as: Bentyl  Take 1 capsule (10 mg total) by mouth 3 (three) times daily as needed.     Fenofibrate 134 MG Caps  Take 1 capsule by mouth nightly.     FLUoxetine HCl 40 MG Caps  Commonly known as: PROZAC  Take 1 capsule (40 mg total) by mouth daily.     fluticasone propionate 50 MCG/ACT Susp  Commonly known as: Flonase  SPRAY ONCE INTO EACH NOSTRIL BID PRN     hydrALAZINE 50 MG Tabs  Commonly known as: Apresoline     isosorbide mononitrate ER 30 MG Tb24  Commonly known as: Imdur     lamoTRIgine 150 MG Tabs  Commonly known as: LaMICtal  Take 1 tablet (150 mg total) by mouth daily.     Lisdexamfetamine Dimesylate 60 MG Caps  Commonly known as: VYVANSE     losartan 100 MG Tabs  Commonly known as: Cozaar     NIFEdipine ER 60 MG Tb24  Commonly known as: ADALAT CC     Renvela 800 MG Tabs  Generic drug: sevelamer carbonate     tamsulosin 0.4 MG Caps  Commonly known as: Flomax            STOP taking these medications       amoxicillin 500 MG Tabs  Commonly known as: AMOXIL     azithromycin 250 MG Tabs  Commonly known as: Zithromax Z-Declan     bisacodyl 5 MG Tbec  Commonly known as: Dulcolax     docusate sodium 100 MG Caps  Commonly known as: Colace     HYDROcodone-acetaminophen 5-325 MG Tabs  Commonly known as: Norco     lidocaine-menthol 4-1 % Ptch     Polyethylene Glycol 3350 17 g Pack  Commonly known as: MIRALAX     predniSONE 20 MG Tabs  Commonly known as: Deltasone     traMADol 50 MG Tabs  Commonly known as: Ultram               Where to Get Your Medications        These medications were sent to [id number] - [place], [place] - [address] [phone], [phone]  [address], [facility] [address]      Phone: [phone]   hydrocortisone 25 MG Supp         Consults: IP CONSULT TO HOSPITALIST  IP CONSULT TO HOSPITALIST  IP CONSULT TO GASTROENTEROLOGY  IP CONSULT TO NEPHROLOGY  IP CONSULT TO PULMONOLOGY    Radiology: XR CHEST PA + LAT CHEST (CPT=71046)    Result Date: 9/2/2024  PROCEDURE:  XR CHEST PA + LAT CHEST (CPT=71046)  INDICATIONS:  hypoxia  COMPARISON:  [facility], CT, CT ABDOMEN+PELVIS(CPT=74176), 8/30/2024, 9:57 AM.  [facility] , XR, XR CHEST AP PORTABLE  (CPT=71045), 8/30/2024, 11:23 PM.  [facility], XR, XR CHEST AP PORTABLE  (CPT=71045), 8/30/2024, 8:57 AM.  TECHNIQUE:  PA and lateral chest radiographs were obtained.  PATIENT STATED HISTORY: (As transcribed by Technologist)  Patient offered no additional history at this time.               CONCLUSION:  The patient is status post mitral valve replacement.  The heart size is within normal limits without CHF.  A right central venous catheter/Rito catheter is noted with tip in the distal SVC. There is decrease in the right lower lobe and right perihilar consolidation with slight decrease in the right effusion.  Stable slight thickening of the horizontal fissure.  The left lung is clear.  No pneumothorax.  Bolus changes are noted in the right lung base  laterally/right middle lobe.   LOCATION:  [facility]   Dictated by (CST): Albert Faith MD on 9/02/2024 at 8:09 AM     Finalized by (CST): Albert Faith MD on 9/02/2024 at 8:11 AM       XR CHEST AP PORTABLE  (CPT=71045)    Result Date: 8/30/2024  PROCEDURE:  XR CHEST AP PORTABLE  (CPT=71045)  TECHNIQUE:  AP chest radiograph was obtained.  COMPARISON:  [facility], XR, XR CHEST AP PORTABLE  (CPT=71045), 8/30/2024, 8:57 AM.  INDICATIONS:  rectal bleeding and chest pain  PATIENT STATED HISTORY: (As transcribed by Technologist)  rectal bleeding and chest pain.    FINDINGS:  Tunneled right internal jugular hemodialysis catheter remains in place.  Stable cardiomegaly with valve prosthesis.  Mild pulmonary vascular congestion and perihilar interstitial edema.  Increased right pleural effusion and right basilar consolidation/atelectasis.            CONCLUSION:  1. Increased right pleural effusion and right basilar atelectasis/consolidation. 2. Stable vascular congestion interstitial edema suggestive of CHF or fluid overload.  LOCATION:  [facility]      Dictated by (CST): Ricki Zaragoza MD on 8/30/2024 at 11:43 PM     Finalized by (CST): Ricki Zaragoza MD on 8/30/2024 at 11:44 PM       CT ABDOMEN+PELVIS(CPT=74176)    Result Date: 8/30/2024  PROCEDURE:  CT ABDOMEN+PELVIS (CPT=74176)  COMPARISON:  [facility], CT, CT ABDOMEN+PELVIS(CONTRAST ONLY)(CPT=74177), 6/25/2024, 7:21 AM.  INDICATIONS:  rectal bleeding, SOB  TECHNIQUE:  Unenhanced multislice CT scanning was performed from the dome of the diaphragm to the pubic symphysis.  Dose reduction techniques were used. Dose information is transmitted to the ACR (American College of Radiology) NRDR (National Radiology Data Registry) which includes the Dose Index Registry.  PATIENT STATED HISTORY: (As transcribed by Technologist)  Patient has rectal bleeding and shortness of breath.    FINDINGS:  LIVER:  No enlargement, atrophy, abnormal density, or significant focal lesion.  BILIARY:   No visible dilatation or calcification.  PANCREAS:  No lesion, fluid collection, ductal dilatation, or atrophy.  SPLEEN:  No enlargement or focal lesion.  KIDNEYS:  No mass, obstruction, or calcification.  ADRENALS:  No mass or enlargement.  AORTA/VASCULAR:    Unremarkable as seen on non-contrast imaging. RETROPERITONEUM:  No mass or adenopathy.  BOWEL/MESENTERY:  No visible mass, obstruction, or bowel wall thickening.  Appendix is normal. ABDOMINAL WALL:  Fat containing bilateral inguinal hernias are noted.  URINARY BLADDER:  No visible focal wall thickening, lesion, or calculus.  PELVIC NODES:  No adenopathy.  PELVIC ORGANS:  No visible mass.  Pelvic organs appropriate for patient age.  BONES:  No bony lesion or fracture.  LUNG BASES:  Small right effusion has increased in size compared to the prior exam.  Loculated effusion along the anterior aspect of the right hemithorax appears progressed from the prior exam.  Right basilar atelectatic changes noted.  Mildly prominent cardiophrenic lymph nodes are noted which are nonspecific. OTHER:  Negative.             CONCLUSION:  1. Small right pleural effusion is increased in size compared to the prior examination.  The right effusion appears partially loculated.  Right basilar atelectatic changes present.   LOCATION:  [facility]   Dictated by (CST): Duy Fitzpatrick MD on 8/30/2024 at 10:55 AM     Finalized by (CST): Duy Fitzpatrick MD on 8/30/2024 at 11:01 AM       XR CHEST AP PORTABLE  (CPT=71045)    Result Date: 8/30/2024  PROCEDURE:  XR CHEST AP PORTABLE  (CPT=71045)  TECHNIQUE:  AP chest radiograph was obtained.  COMPARISON:  [facility], XR, XR CHEST PA + LAT CHEST (CPT=71046), 8/11/2024, 5:57 PM.  [facility], XR, XR CHEST AP PORTABLE  (CPT=71045), 8/08/2024, 5:00 PM.  INDICATIONS:  rectal bleeding, SOB  PATIENT STATED HISTORY: (As transcribed by Technologist)  Patient had onset of right sided abdominal pain with bloating/swelling to the abdomen, on 9/29/24.  He also  complains of shortness of breath.  He denies any nausea, vomiting or specific chest pain.  Patient has a history of mitrovalve surgery in 2022.    FINDINGS:  Stable right-sided dialysis catheter.  Cardiomegaly.  Cardiac valve prosthesis.  Interstitial opacities bilaterally.  Small right-sided pleural effusion right basilar atelectasis/infiltrates.  No pneumothorax.            CONCLUSION:  1. Cardiomegaly with interstitial opacities likely representing edema. 2. Small right-sided pleural effusion with right basilar atelectasis/infiltrates.    LOCATION:  [facility]      Dictated by (CST): Quinn Bernal MD on 8/30/2024 at 9:16 AM     Finalized by (CST): Quinn Bernal MD on 8/30/2024 at 9:22 AM       XR CHEST PA + LAT CHEST (CPT=71046)    Result Date: 8/11/2024  PROCEDURE:  XR CHEST PA + LAT CHEST (CPT=71046)  INDICATIONS:  seen two days ago dx with pnuemonia, increased perla and pain in back and abdomen  COMPARISON:  [facility], XR, XR CHEST AP PORTABLE  (CPT=71045), 8/08/2024, 5:00 PM.  TECHNIQUE:  PA and lateral chest radiographs were obtained.  PATIENT STATED HISTORY: (As transcribed by Technologist)  Patient shares he has right-sided chest pain and pain with breathing. Recent diagnosis of pneumonia.               CONCLUSION:  Worsening consolidation and effusion in the right lung base.  Central venous catheter tip SVC.  Normal heart size and pulmonary vascularity.  No pneumothorax.   LOCATION:  [facility]   Dictated by ([facility]): Arlyn Oglesby MD on 8/11/2024 at 6:30 PM     Finalized by (CST): Arlyn Oglesby MD on 8/11/2024 at 6:30 PM       XR CHEST AP PORTABLE  (CPT=71045)    Result Date: 8/8/2024  PROCEDURE:  XR CHEST AP PORTABLE  (CPT=71045)  TECHNIQUE:  AP chest radiograph was obtained.  COMPARISON:  [facility], XR, XR CHEST AP PORTABLE  (CPT=71045), 6/25/2024, 8:53 AM.  [facility], XR, XR CHEST AP PORTABLE  (CPT=71045), 6/14/2024, 5:12 PM.  INDICATIONS:  From [facility]- shortness of breath since last night  PATIENT STATED  HISTORY: (As transcribed by Technologist)  Day 3 right side chest pain and short of breath. Port-a-cath insertion 2yrs ago.    FINDINGS:  Right-sided dialysis catheter is stable.  Cardiac size within normal limits.  Small right-sided pleural effusion with right basilar opacity.  No pneumothorax.            CONCLUSION:  Small right-sided pleural effusion with right basilar atelectasis/infiltrates.   LOCATION:  [facility]      Dictated by (CST): Quinn Bernal MD on 8/08/2024 at 5:20 PM     Finalized by (CST): Quinn Bernal MD on 8/08/2024 at 5:20 PM          Operative Procedures:      Code Status: Full Code    Total Time Coordinating Care: Greater than 30 minutes    Patient had opportunity to ask questions and state understand and agree with therapeutic plan as outlined. I reviewed and reconciled current and discharge medications on day of discharge and discussed meds with patient. D/w RN     Yuriy Forrest MD  [facility] Hospital[facility]  [phone]  9/2/2024  2:08 PM

## 2024-09-02 NOTE — PROGRESS NOTES
[facility]   part of [facility]    Nephrology Progress Note    Godwin Fonseca Attending:  Yuriy Forrest MD       SUBJECTIVE:     Off supplemental O2.  Breathing well laying flat.  No leg swelling.    PHYSICAL EXAM:     Vital Signs: /69 (BP Location: Right arm)   Pulse 75   Temp 97.7 °F (36.5 °C) (Oral)   Resp 19   Ht 188 cm (6' 2\")   Wt 248 lb 8 oz (112.7 kg)   SpO2 96%   BMI 31.91 kg/m²   Temp (24hrs), Av.4 °F (36.3 °C), Min:96.7 °F (35.9 °C), Max:98 °F (36.7 °C)       Intake/Output Summary (Last 24 hours) at 2024 1020  Last data filed at 2024 0900  Gross per 24 hour   Intake 694 ml   Output 1000 ml   Net -306 ml     Wt Readings from Last 3 Encounters:   24 248 lb 8 oz (112.7 kg)   24 250 lb (113.4 kg)   24 240 lb (108.9 kg)       General: pleasant, well appearing  Skin: no visible rashes  HEENT: NCAT  Cardiac: Regular rate and rhythm, no murmur/gallop or rub  Lungs: CTAB, no wheeze, no rale, no rhonchi  Abdomen: Soft, NTND  Extremities: warm, well perfused, no leg edema  Neurologic/Psych: mentating well  [facility] CVC    LABORATORY DATA:     Lab Results   Component Value Date     (H) 2024    BUN 66 (H) 2024    BUNCREA 13.0 2022    CREATSERUM 7.76 (H) 2024    ANIONGAP 11 2024    GFR 59 (L) 2018    GFRNAA 30 (L) 2022    GFRAA 35 (L) 2022    CA 8.1 (L) 2024    OSMOCALC 311 (H) 2024    ALKPHO 99 2024    AST 31 2024    ALT 23 2024    BILT 0.3 2024    TP 7.4 2024    ALB 3.8 2024    GLOBULIN 3.1 2024     2024    K 3.9 2024     2024    CO2 23.0 2024     Lab Results   Component Value Date    WBC 5.6 2024    RBC 2.85 (L) 2024    HGB 9.2 (L) 2024    HCT 28.3 (L) 2024    .0 2024    MPV 11.5 2012    MCV 99.3 2024    MCH 32.3 2024    MCHC 32.5 2024    RDW 16.4 2024     NEPRELIM 3.56 09/01/2024    NEPERCENT 63.4 09/01/2024    LYPERCENT 18.4 09/01/2024    MOPERCENT 11.4 09/01/2024    EOPERCENT 5.5 09/01/2024    BAPERCENT 1.1 09/01/2024    NE 3.56 09/01/2024    LYMABS 1.03 09/01/2024    MOABSO 0.64 09/01/2024    EOABSO 0.31 09/01/2024    BAABSO 0.06 09/01/2024     Lab Results   Component Value Date    MALBP 167.00 05/24/2023    CREUR 142.00 05/24/2023    CREUR 142.00 05/24/2023     Lab Results   Component Value Date    COLORUR Yellow 01/03/2024    CLARITY Clear 01/03/2024    SPECGRAVITY 1.025 01/03/2024    GLUUR Negative 01/03/2024    BILUR Negative 01/03/2024    KETUR Trace (A) 01/03/2024    BLOODURINE Negative 01/03/2024    PHURINE 5.5 01/03/2024    PROUR >=300 (A) 01/03/2024    UROBILINOGEN 0.2 01/03/2024    NITRITE Negative 01/03/2024    LEUUR Negative 01/03/2024    WBCUR 6-10 (A) 01/03/2024    RBCUR 0-2 01/03/2024    EPIUR Few (A) 01/03/2024    BACUR Rare (A) 01/03/2024    CAOXUR Occasional (A) 04/20/2018    HYLUR Present (A) 05/14/2019         IMAGING:     Reviewed.    MEDICATIONS:       Current Facility-Administered Medications   Medication Dose Route Frequency    epoetin sylvia (Epogen, Procrit) 01791 UNIT/ML injection 10,000 Units  10,000 Units Intravenous Once    sevelamer carbonate (Renvela) tab 1,600 mg  1,600 mg Oral TID CC    sodium chloride 0.9 % IV bolus 100 mL  100 mL Intravenous Q30 Min PRN    And    albumin human (Albumin) 25% injection 25 g  25 g Intravenous PRN Dialysis    heparin (Porcine) 5000 UNIT/ML injection 5,000 Units  5,000 Units Subcutaneous Q8H MARTHA    acetaminophen (Tylenol Extra Strength) tab 500 mg  500 mg Oral Q4H PRN    acetaminophen (Tylenol) tab 650 mg  650 mg Oral Q4H PRN    Or    HYDROcodone-acetaminophen (Norco) 5-325 MG per tab 1 tablet  1 tablet Oral Q4H PRN    Or    HYDROcodone-acetaminophen (Norco) 5-325 MG per tab 2 tablet  2 tablet Oral Q4H PRN    melatonin tab 3 mg  3 mg Oral Nightly PRN    ondansetron (Zofran) 4 MG/2ML injection 4 mg  4  mg Intravenous Q6H PRN    prochlorperazine (Compazine) 10 MG/2ML injection 5 mg  5 mg Intravenous Q8H PRN    ARIPiprazole (Abilify) tab 5 mg  5 mg Oral Daily    buPROPion ER (Wellbutrin XL) 24 hr tab 150 mg  150 mg Oral Daily    carvedilol (Coreg) tab 25 mg  25 mg Oral BID with meals    fenofibrate micronized (Lofibra) cap 134 mg  134 mg Oral Nightly    FLUoxetine (PROzac) cap 40 mg  40 mg Oral Daily    isosorbide mononitrate ER (Imdur) 24 hr tab 30 mg  30 mg Oral Daily    lamoTRIgine (LaMICtal) tab 150 mg  150 mg Oral Daily    NIFEdipine ER (Procardia-XL) 24 hr tab 60 mg  60 mg Oral BID    losartan (Cozaar) tab 100 mg  100 mg Oral Daily    amphetamine-dextroamphetamine (Adderall) tab 10 mg  10 mg Oral BID AC    ipratropium-albuterol (Duoneb) 0.5-2.5 (3) MG/3ML inhalation solution 3 mL  3 mL Nebulization Q6H PRN    hydrocortisone (Anusol-HC) 25 MG rectal suppository 25 mg  25 mg Rectal BID    cloNIDine (Catapres) tab 0.1 mg  0.1 mg Oral BID    HYDROmorphone (Dilaudid) 1 MG/ML injection 0.1 mg  0.1 mg Intravenous Q6H PRN    Or    HYDROmorphone (Dilaudid) 1 MG/ML injection 0.2 mg  0.2 mg Intravenous Q6H PRN    Or    HYDROmorphone (Dilaudid) 1 MG/ML injection 0.4 mg  0.4 mg Intravenous Q6H PRN    glucose (Dex4) 15 GM/59ML oral liquid 15 g  15 g Oral Q15 Min PRN    Or    glucose (Glutose) 40% oral gel 15 g  15 g Oral Q15 Min PRN    Or    glucose-vitamin C (Dex-4) chewable tab 4 tablet  4 tablet Oral Q15 Min PRN    Or    dextrose 50% injection 50 mL  50 mL Intravenous Q15 Min PRN    Or    glucose (Dex4) 15 GM/59ML oral liquid 30 g  30 g Oral Q15 Min PRN    Or    glucose (Glutose) 40% oral gel 30 g  30 g Oral Q15 Min PRN    Or    glucose-vitamin C (Dex-4) chewable tab 8 tablet  8 tablet Oral Q15 Min PRN    insulin aspart (NovoLOG) 100 Units/mL FlexPen 1-5 Units  1-5 Units Subcutaneous TID AC and HS       ASSESSMENT/PLAN:     45 yo M with history of ESRD on iHD MWF via [facility] CVC, hyperaldosteronism, DM, biopolar disorder  presented with abd pain, BRBPR, sob.     ESRD:  -- HD today per outpatient schedule, 2-3L UF as tolerated     Anemia in ESRD:  -- OWEN with HD today     MBD:  -- hyperphosphatemic -> increase sevelamer to 1600 TID AC     Hyperkalemia:  -- resolved     HTN:  -- restarted coreg 25 mg BID, imdur 30 mg/d, losartan 100 mg/d, nifedipine XL 60 mg/d  -- changed clonidine from daily dosing to BID dosing to avoid rebound     Hypoxia:  -- CXR with pleural effusions and interstitial edema  -- UF per above     GI bleeding:  -- plan for outpatient capsule endoscopy per notes     Ok to discharge following HD today from my perspective.    Thank you for allowing me to participate in the care of this patient. Please do not hesitate to call with questions or concerns.        Lenka Bond MD  [facility] Nephrology  [address] [place] [id number]    9/2/2024  10:20 AM

## 2024-09-02 NOTE — PLAN OF CARE
Patient alert and oriented. Complains of same pain to right lower abdomen. Refuses norco. Prefers dilaudid. Dilaudid q 6hours PRN. Great urine output. IV antibiotics. Vitals stable. Room air all through night with SPO2>94%. Needs met. Kept clean and dry.       Problem: PAIN - ADULT  Goal: Verbalizes/displays adequate comfort level or patient's stated pain goal  Description: INTERVENTIONS:  - Encourage pt to monitor pain and request assistance  - Assess pain using appropriate pain scale  - Administer analgesics based on type and severity of pain and evaluate response  - Implement non-pharmacological measures as appropriate and evaluate response  - Consider cultural and social influences on pain and pain management  - Manage/alleviate anxiety  - Utilize distraction and/or relaxation techniques  - Monitor for opioid side effects  - Notify MD/LIP if interventions unsuccessful or patient reports new pain  - Anticipate increased pain with activity and pre-medicate as appropriate  Outcome: Progressing     Problem: RISK FOR INFECTION - ADULT  Goal: Absence of fever/infection during anticipated neutropenic period  Description: INTERVENTIONS  - Monitor WBC  - Administer growth factors as ordered  - Implement neutropenic guidelines  Outcome: Progressing     Problem: SAFETY ADULT - FALL  Goal: Free from fall injury  Description: INTERVENTIONS:  - Assess pt frequently for physical needs  - Identify cognitive and physical deficits and behaviors that affect risk of falls.  - [place] fall precautions as indicated by assessment.  - Educate pt/family on patient safety including physical limitations  - Instruct pt to call for assistance with activity based on assessment  - Modify environment to reduce risk of injury  - Provide assistive devices as appropriate  - Consider OT/PT consult to assist with strengthening/mobility  - Encourage toileting schedule  Outcome: Progressing     Problem: DISCHARGE PLANNING  Goal: Discharge to home  or other facility with appropriate resources  Description: INTERVENTIONS:  - Identify barriers to discharge w/pt and caregiver  - Include patient/family/discharge partner in discharge planning  - Arrange for needed discharge resources and transportation as appropriate  - Identify discharge learning needs (meds, wound care, etc)  - Arrange for interpreters to assist at discharge as needed  - Consider post-discharge preferences of patient/family/discharge partner  - Complete POLST form as appropriate  - Assess patient's ability to be responsible for managing their own health  - Refer to Case Management Department for coordinating discharge planning if the patient needs post-hospital services based on physician/LIP order or complex needs related to functional status, cognitive ability or social support system  Outcome: Progressing

## 2024-09-02 NOTE — DIETARY NOTE
[facility]   part of [facility]    NUTRITION ASSESSMENT    Unable to diagnose malnutrition criteria at this time.        NUTRITION INTERVENTION:    Meal and Snacks - Monitor and encourage adequate PO intake. Carb controlled, 2 g Na restriction  Vitamin and Mineral Supplements - adding Dialyvite      PATIENT STATUS: 09/02/24 44/M admitted d/t GI bleed with rectal bleeding and SOB. RD consulted d/t MST. Colonoscopy 1 month ago did not reveal any notable issues. Pt is waiting for capsul endoscopy. Pt is currently receiving HD M/W/F and reports feeling better after session that was given on Saturday. Currently back to normal scheduled HD.Pt reports dark stools with clots with last BM being on 8/31. Pt reports chronic pain in low abd and ascending colon for a few months. Pt also reported 45 lb wt loss within 2 months but EMR does not confirm this information. Pt eats 2x meals/day at home on regular diet. During admission pt has been eating 3x meals/day finishing %. Pt does not want any ONS at this time. Pt also mentioned becoming full easily and was recommended to eat more slowly, eat foods first then consume liquids, and do not consume too much high fiber foods at once.     PMH: ESRD on HD, bipolar, depression, DM 2, HTN, DL, CAD, COPD, HFpEF, chronic abdominal pain     ANTHROPOMETRICS:  Ht: 188 cm (6' 2\")  Wt: 112.7 kg (248 lb 8 oz).   BMI: Body mass index is 31.91 kg/m².  IBW: 80.9 kg      WEIGHT HISTORY:   Weight loss: Per pt recall: Yes, Severe Wt loss of 45 lbs, 18%, over 2 months     Wt Readings from Last 10 Encounters:   09/02/24 112.7 kg (248 lb 8 oz)   08/30/24 113.4 kg (250 lb)   08/11/24 108.9 kg (240 lb)   08/08/24 108.9 kg (240 lb)   06/25/24 108.9 kg (240 lb)   06/23/24 108.9 kg (240 lb)   06/16/24 111.8 kg (246 lb 7.6 oz)   05/14/24 108 kg (238 lb)   05/13/24 108 kg (238 lb)   05/13/24 113.4 kg (250 lb)        NUTRITION:  Diet:       Procedures    Sodium restricted diet Sodium Restriction:  2 GM NA; Calorie Restriction/Carb Controlled: 1800 kcal/60 grams; Is Patient on Accuchecks? Yes      Food Allergies: No  Cultural/Ethnic/Druze Preferences Addressed: Yes    Percent Meals Eaten (last 3 days)       Date/Time Percent Meals Eaten (%)    09/01/24 0900 100 %    09/01/24 1500 100 %    09/02/24 0900 100 %            GI system review: abdominal pain, rectal bleeding Last BM: 9/31  Skin and wounds: skin intact    NUTRITION RELATED PHYSICAL FINDINGS:     1. Body Fat/Muscle Mass: no wasting noted     2. Fluid Accumulation: none per RN documentation     NUTRITION PRESCRIPTION: 80.9 kg  Calories: 4008-7894 calories/day (22-25 kcal/kg)  Protein:  grams protein/day (1.0-1.5 grams protein per kg)  Fluid: ~1 ml/kcal or per MD discretion    NUTRITION DIAGNOSIS/PROBLEM:  Other Unintentional wt loss per pt recall  related to insufficient appetite resulting in inadequate nutrition intake as evidenced by documented/reported unintentional weight loss      MONITOR AND EVALUATE/NUTRITION GOALS:  PO intake of 75% of meals TID - New  Weight stable within 1 to 2 lbs during admission - New      MEDICATIONS:  Adderall, Dilaudid    LABS:  Glu 102, BUN 66, Creatinine 7.76, GFR 8, Phos 6.3    Pt is at Moderate nutrition risk    James Titus  Dietetic Intern  [id number]

## 2024-09-02 NOTE — PROGRESS NOTES
[facility]    Godwin Fonseca Patient Status:  Inpatient    1980 MRN [id number]   Location [facility] 2NE-A Attending Yuriy Forrest MD   Hosp Day # 2 PCP Prieto Novak MD     Pulm / Critical Care Progress Note     S: looks and feels better      Scheduled Medication:   epoetin sylvia  10,000 Units Intravenous Once    sevelamer carbonate  1,600 mg Oral TID CC    heparin  5,000 Units Subcutaneous Q8H MARTHA    ARIPiprazole  5 mg Oral Daily    buPROPion ER  150 mg Oral Daily    carvedilol  25 mg Oral BID with meals    fenofibrate micronized  134 mg Oral Nightly    FLUoxetine HCl  40 mg Oral Daily    isosorbide mononitrate ER  30 mg Oral Daily    lamoTRIgine  150 mg Oral Daily    NIFEdipine ER  60 mg Oral BID    losartan  100 mg Oral Daily    amphetamine-dextroamphetamine  10 mg Oral BID AC    piperacillin-tazobactam  3.375 g Intravenous Q12H    hydrocortisone  25 mg Rectal BID    cloNIDine  0.1 mg Oral BID    insulin aspart  1-5 Units Subcutaneous TID AC and HS     Continuous Infusing Medication:  PRN Medication:  sodium chloride **AND** albumin human    acetaminophen    acetaminophen **OR** HYDROcodone-acetaminophen **OR** HYDROcodone-acetaminophen    melatonin    ondansetron    prochlorperazine    ipratropium-albuterol    HYDROmorphone **OR** HYDROmorphone **OR** HYDROmorphone    glucose **OR** glucose **OR** glucose-vitamin C **OR** dextrose **OR** glucose **OR** glucose **OR** glucose-vitamin C       OBJECTIVE:  Vitals:    24 0006 24 0100 24 0512 24 0836   BP: 120/81  127/89 128/69   BP Location: Right arm  Right arm Right arm   Pulse: 73 71 82 75   Resp: 20  22 19   Temp: 96.7 °F (35.9 °C)  97.1 °F (36.2 °C) 97.7 °F (36.5 °C)   TempSrc: Axillary  Axillary Oral   SpO2: 97% 93% 94% 96%   Weight:   248 lb 8 oz (112.7 kg)    Height:         O2: ra       Wt Readings from Last 3 Encounters:   24 248 lb 8 oz (112.7 kg)   24 250 lb (113.4 kg)   24 240 lb (108.9  kg)        I/O last 3 completed shifts:  In: 580 [P.O.:480; IV PIGGYBACK:100]  Out: 1700 [Urine:1700]  No intake/output data recorded.     Physical Exam:   General: alert, cooperative,  No respiratory distress.   Head: Normocephalic, without obvious abnormality, atraumatic.   Lungs: ctab   Chest wall: No tenderness or deformity.   Heart: Regular rate and rhythm, normal S1S2, no murmur.   Abdomen: soft, non-tender, non-distended, positive BS.   Extremity: no edema     Recent Labs   Lab 08/30/24  2310 08/31/24  1017 09/01/24  0719   WBC 7.8 11.2* 5.6   HGB 8.7* 9.3* 9.2*   HCT 27.5* 28.3* 28.3*   .0 261.0 230.0     Recent Labs   Lab 08/30/24  0840   INR 1.05         Recent Labs   Lab 08/30/24  0840 08/30/24  2310 08/31/24  1017 09/01/24  0719 09/02/24  0531    141 142 141 141   K 4.7 5.6* 4.0 4.0 3.9    110 106 104 107   CO2 19.0* 25.0 24.0 25.0 23.0   * 102* 67* 58* 66*   CREATSERUM 11.40* 10.44* 6.77* 7.38* 7.76*   * 90 82 133* 102*   CA 8.0* 8.4* 8.7 8.3* 8.1*   AST 17 31  --   --   --    ALT 25 23  --   --   --    ALKPHO 86 99  --   --   --    BILT 0.3 0.3  --   --   --    ALB 3.5 4.3  --  4.2 3.8   TP 7.1 7.4  --   --   --        CREATININE (mg/dL)   Date Value   12/14/2012 1.1     Creatinine (mg/dL)   Date Value   09/02/2024 7.76 (H)   09/01/2024 7.38 (H)   08/31/2024 6.77 (H)   03/07/2022 2.87 (H)   10/11/2021 2.54 (H)   08/30/2021 2.72 (H)   ]       Imaging: cxr: improved.      ASSESSMENT/PLAN:    Acute resp failure: suspect due to pulm edema. At risk for PE given recent hospitalizations as well as hap; given improvement with dialysis fluid is the most likely explanation   - stop abx  - dialysis per renal   Esrd:   - hd per renal   Gi bleed; recurrent rectal bleeding, has had scopes recently without clear etiology   - gi consulted.   - follow hgb, transfuse for < 7     Will sign off      Anna Waller M.D.  [facility]  Pulmonary / Critical care  9/2/2024  8:51  AM

## 2024-09-02 NOTE — PLAN OF CARE
NURSING DISCHARGE NOTE    Discharged Home via Ambulatory.  Accompanied by Support staff  Belongings Taken by patient/family.    Patient is stable to discharge home. Medically cleared by all consults and the hospitalist. Reviewed discharge instructions about medications and post-discharge follow up visits with the patient. Patient verbalized understanding. Questions and concerns addressed. PIV line dc'ed, pressure and dressing applied. Clean/dry/intact. Perma HD cath left in place with clean and dry dressing. Discharge navigator completed. Discharge AVS printed out and given to patient. Tele box removed, cleaned, and returned to cabinet. All patient's belongings sent with patient.    Problem: Patient/Family Goals  Goal: Patient/Family Long Term Goal  Description: Patient's Long Term Goal: Discharge home    Interventions:  - Follow plan of care  - Pain management  - See additional Care Plan goals for specific interventions  Outcome: Completed  Goal: Patient/Family Short Term Goal  Description: Patient's Short Term Goal: Manage pain    Interventions:   - Prn meds per mar  - HD  - See additional Care Plan goals for specific interventions  Outcome: Completed     Problem: PAIN - ADULT  Goal: Verbalizes/displays adequate comfort level or patient's stated pain goal  Description: INTERVENTIONS:  - Encourage pt to monitor pain and request assistance  - Assess pain using appropriate pain scale  - Administer analgesics based on type and severity of pain and evaluate response  - Implement non-pharmacological measures as appropriate and evaluate response  - Consider cultural and social influences on pain and pain management  - Manage/alleviate anxiety  - Utilize distraction and/or relaxation techniques  - Monitor for opioid side effects  - Notify MD/LIP if interventions unsuccessful or patient reports new pain  - Anticipate increased pain with activity and pre-medicate as appropriate  Outcome: Completed     Problem: RISK FOR  INFECTION - ADULT  Goal: Absence of fever/infection during anticipated neutropenic period  Description: INTERVENTIONS  - Monitor WBC  - Administer growth factors as ordered  - Implement neutropenic guidelines  Outcome: Completed     Problem: SAFETY ADULT - FALL  Goal: Free from fall injury  Description: INTERVENTIONS:  - Assess pt frequently for physical needs  - Identify cognitive and physical deficits and behaviors that affect risk of falls.  - Hooper fall precautions as indicated by assessment.  - Educate pt/family on patient safety including physical limitations  - Instruct pt to call for assistance with activity based on assessment  - Modify environment to reduce risk of injury  - Provide assistive devices as appropriate  - Consider OT/PT consult to assist with strengthening/mobility  - Encourage toileting schedule  Outcome: Completed     Problem: DISCHARGE PLANNING  Goal: Discharge to home or other facility with appropriate resources  Description: INTERVENTIONS:  - Identify barriers to discharge w/pt and caregiver  - Include patient/family/discharge partner in discharge planning  - Arrange for needed discharge resources and transportation as appropriate  - Identify discharge learning needs (meds, wound care, etc)  - Arrange for interpreters to assist at discharge as needed  - Consider post-discharge preferences of patient/family/discharge partner  - Complete POLST form as appropriate  - Assess patient's ability to be responsible for managing their own health  - Refer to Case Management Department for coordinating discharge planning if the patient needs post-hospital services based on physician/LIP order or complex needs related to functional status, cognitive ability or social support system  Outcome: Completed

## 2024-09-03 LAB
ATRIAL RATE: 71 BPM
P AXIS: 24 DEGREES
P-R INTERVAL: 204 MS
Q-T INTERVAL: 430 MS
QRS DURATION: 104 MS
QTC CALCULATION (BEZET): 467 MS
R AXIS: 25 DEGREES
T AXIS: 76 DEGREES
VENTRICULAR RATE: 71 BPM

## 2024-09-04 NOTE — PAYOR COMM NOTE
--------------  DISCHARGE REVIEW    Payor: [employer]  Subscriber #:  [id number]  Authorization Number: [id number]    Admit date: 8/31/24  Admit time:   2:16 AM  Discharge Date: 9/2/2024  6:10 PM     Admitting Physician: Yuriy Forrest MD  Attending Physician:  No att. providers found  Primary Care Physician: Prieto Novak MD          Discharge Summary Notes        Discharge Summary signed by Yuriy Forrest MD at 9/2/2024  2:13 PM       Author: Yuriy Forrest MD Specialty: HOSPITALIST, Internal Medicine Author Type: Physician    Filed: 9/2/2024  2:13 PM Date of Service: 9/2/2024 12:08 PM Status: Addendum    : Yuriy Forrest MD (Physician)    Related Notes: Original Note by Yuriy Forrest MD (Physician) filed at 9/2/2024  2:13 PM           [facility] Internal Medicine Discharge Summary   Patient ID:  Godwin Fonseca  [id number]  44 year old  4/12/1980    Admit date: 8/30/2024    Discharge date and time: 9/2/2024     Attending Physician: Yuriy Forrest MD     Primary Care Physician: Prieto Novak MD     Admit Dx: End stage renal disease on dialysis (HCC) [N18.6, Z99.2]  Acute respiratory failure with hypoxia (HCC) [J96.01]  Gastrointestinal hemorrhage, unspecified gastrointestinal hemorrhage type [K92.2]  Hypervolemia, unspecified hypervolemia type [E87.70]    Hospital Discharge Diagnoses:  ARF    Disposition: home    Important follow up:  -PCP in [x] within 7 days [] within 14 days [] other    Heath Vu MD  [address]  [address]  [place] [place] [address] [phone]    Follow up  capsule endoscopy      Hospital Course:   44 year old male with PMH significant for ESRD on HD, bipolar, depression, DM 2, HTN, DL, CAD, COPD, HFpEF, chronic abdominal pain presents to the ED c sob and GIB.      Dark stool  - apprec GI recs  - hgb stable, EGD and colonoscopy reportedly normal earlier this month  - outpatient capsule endoscopy and f/u with Dr. Heath Vu    -anusol suppository bid prn for bleeding   - follow sxs and hgb      Acute respiratory failure  -likely 2/2 pulm edema  - apprec pulm recs  - HD  - empiric zosyn, repeat CXR noted, stopped abx per pulm   - consider CTA if worsens   - was on BIPAP, then 5L, now RA     ESRD on HD  -HD T/TH/Sa  -nephrology consulted     Chronic pain  Likely drug use dependance  -Follows with pain clinic outpatient  -Resume home pain medication.  -minimize iv pain meds     Anemia  -chronic - hgb 8.7 to 9.3 to 9.2 to 8.3   -hx of GI bleed  - follow as o/p      HTN  DL  CAD  HFpEF  -resume home meds     DM2  -hold po meds  -ISS +accuchecks     COPD  -no evidence of acute exacerbation  -resume home inhalers     MDD  Bipolar   -resume home meds     Leukocytosis  -follow, 7.8 to 11.2 to 5.6     Day of discharge Exam   Vitals:    09/02/24 1220   BP: (!) 137/92   Pulse: 85   Resp: 18   Temp: 97.8 °F (36.6 °C)       Exam on day of discharge:  Feeling lot better. Pain same. No n/v. Sitting up in chair, hasn't walked.     Gen: No acute distress, alert and oriented  CV: RRR, +s1/s2  Lungs: CTAB, good respiratory effort  Abdomen: s/nt/nd  Ext: Moves all 4 extremities, no c/c/e  Neuro: CN Intact, no focal deficits      Discharge meds     Medication List        START taking these medications      hydrocortisone 25 MG Supp  Commonly known as: Anusol-HC  Place 1 suppository (25 mg total) rectally 2 (two) times daily.            CONTINUE taking these medications      ARIPiprazole 5 MG Tabs  Commonly known as: Abilify     buPROPion  MG Tb24  Commonly known as: Wellbutrin XL     carvedilol 25 MG Tabs  Commonly known as: Coreg  Take 1 tablet (25 mg total) by mouth in the morning and 1 tablet (25 mg total) in the evening. Take with meals.     cloNIDine 0.1 MG Tabs  Commonly known as: Catapres     dicyclomine 10 MG Caps  Commonly known as: Bentyl  Take 1 capsule (10 mg total) by mouth 3 (three) times daily as needed.     Fenofibrate 134 MG Caps  Take  1 capsule by mouth nightly.     FLUoxetine HCl 40 MG Caps  Commonly known as: PROZAC  Take 1 capsule (40 mg total) by mouth daily.     fluticasone propionate 50 MCG/ACT Susp  Commonly known as: Flonase  SPRAY ONCE INTO EACH NOSTRIL BID PRN     hydrALAZINE 50 MG Tabs  Commonly known as: Apresoline     isosorbide mononitrate ER 30 MG Tb24  Commonly known as: Imdur     lamoTRIgine 150 MG Tabs  Commonly known as: LaMICtal  Take 1 tablet (150 mg total) by mouth daily.     Lisdexamfetamine Dimesylate 60 MG Caps  Commonly known as: VYVANSE     losartan 100 MG Tabs  Commonly known as: Cozaar     NIFEdipine ER 60 MG Tb24  Commonly known as: ADALAT CC     Renvela 800 MG Tabs  Generic drug: sevelamer carbonate     tamsulosin 0.4 MG Caps  Commonly known as: Flomax            STOP taking these medications      amoxicillin 500 MG Tabs  Commonly known as: AMOXIL     azithromycin 250 MG Tabs  Commonly known as: Zithromax Z-Declan     bisacodyl 5 MG Tbec  Commonly known as: Dulcolax     docusate sodium 100 MG Caps  Commonly known as: Colace     HYDROcodone-acetaminophen 5-325 MG Tabs  Commonly known as: Norco     lidocaine-menthol 4-1 % Ptch     Polyethylene Glycol 3350 17 g Pack  Commonly known as: MIRALAX     predniSONE 20 MG Tabs  Commonly known as: Deltasone     traMADol 50 MG Tabs  Commonly known as: Ultram               Where to Get Your Medications        These medications were sent to [id number] - [place], [place] - [address]  [phone], [phone]  [address] [place] [address]      Phone: [phone]   hydrocortisone 25 MG Supp         Consults: IP CONSULT TO HOSPITALIST  IP CONSULT TO HOSPITALIST  IP CONSULT TO GASTROENTEROLOGY  IP CONSULT TO NEPHROLOGY  IP CONSULT TO PULMONOLOGY    Radiology: XR CHEST PA + LAT CHEST (CPT=71046)    Result Date: 9/2/2024  PROCEDURE:  XR CHEST PA + LAT CHEST (CPT=71046)  INDICATIONS:  hypoxia  COMPARISON:  [place], CT, CT ABDOMEN+PELVIS(CPT=74176), 8/30/2024, 9:57 AM.   [facility] , XR, XR CHEST AP PORTABLE  (CPT=71045), 8/30/2024, 11:23 PM.  [facility], XR, XR CHEST AP PORTABLE  (CPT=71045), 8/30/2024, 8:57 AM.  TECHNIQUE:  PA and lateral chest radiographs were obtained.  PATIENT STATED HISTORY: (As transcribed by Technologist)  Patient offered no additional history at this time.               CONCLUSION:  The patient is status post mitral valve replacement.  The heart size is within normal limits without CHF.  A right central venous catheter/Rito catheter is noted with tip in the distal SVC. There is decrease in the right lower lobe and right perihilar consolidation with slight decrease in the right effusion.  Stable slight thickening of the horizontal fissure.  The left lung is clear.  No pneumothorax.  Bolus changes are noted in the right lung base laterally/right middle lobe.   LOCATION:  [facility]   Dictated by (CST): Albert Faith MD on 9/02/2024 at 8:09 AM     Finalized by (CST): Albert Faith MD on 9/02/2024 at 8:11 AM       XR CHEST AP PORTABLE  (CPT=71045)    Result Date: 8/30/2024  PROCEDURE:  XR CHEST AP PORTABLE  (CPT=71045)  TECHNIQUE:  AP chest radiograph was obtained.  COMPARISON:  [facility], XR, XR CHEST AP PORTABLE  (CPT=71045), 8/30/2024, 8:57 AM.  INDICATIONS:  rectal bleeding and chest pain  PATIENT STATED HISTORY: (As transcribed by Technologist)  rectal bleeding and chest pain.    FINDINGS:  Tunneled right internal jugular hemodialysis catheter remains in place.  Stable cardiomegaly with valve prosthesis.  Mild pulmonary vascular congestion and perihilar interstitial edema.  Increased right pleural effusion and right basilar consolidation/atelectasis.            CONCLUSION:  1. Increased right pleural effusion and right basilar atelectasis/consolidation. 2. Stable vascular congestion interstitial edema suggestive of CHF or fluid overload.  LOCATION:  [facility]      Dictated by (CST): Ricki Zaragoza MD on 8/30/2024 at 11:43 PM     Finalized by (CST):  Ricki Zaragoza MD on 8/30/2024 at 11:44 PM       CT ABDOMEN+PELVIS(CPT=74176)    Result Date: 8/30/2024  PROCEDURE:  CT ABDOMEN+PELVIS (CPT=74176)  COMPARISON:  [facility], CT, CT ABDOMEN+PELVIS(CONTRAST ONLY)(CPT=74177), 6/25/2024, 7:21 AM.  INDICATIONS:  rectal bleeding, SOB  TECHNIQUE:  Unenhanced multislice CT scanning was performed from the dome of the diaphragm to the pubic symphysis.  Dose reduction techniques were used. Dose information is transmitted to the ACR (American College of Radiology) NRDR (National Radiology Data Registry) which includes the Dose Index Registry.  PATIENT STATED HISTORY: (As transcribed by Technologist)  Patient has rectal bleeding and shortness of breath.    FINDINGS:  LIVER:  No enlargement, atrophy, abnormal density, or significant focal lesion.  BILIARY:  No visible dilatation or calcification.  PANCREAS:  No lesion, fluid collection, ductal dilatation, or atrophy.  SPLEEN:  No enlargement or focal lesion.  KIDNEYS:  No mass, obstruction, or calcification.  ADRENALS:  No mass or enlargement.  AORTA/VASCULAR:    Unremarkable as seen on non-contrast imaging. RETROPERITONEUM:  No mass or adenopathy.  BOWEL/MESENTERY:  No visible mass, obstruction, or bowel wall thickening.  Appendix is normal. ABDOMINAL WALL:  Fat containing bilateral inguinal hernias are noted.  URINARY BLADDER:  No visible focal wall thickening, lesion, or calculus.  PELVIC NODES:  No adenopathy.  PELVIC ORGANS:  No visible mass.  Pelvic organs appropriate for patient age.  BONES:  No bony lesion or fracture.  LUNG BASES:  Small right effusion has increased in size compared to the prior exam.  Loculated effusion along the anterior aspect of the right hemithorax appears progressed from the prior exam.  Right basilar atelectatic changes noted.  Mildly prominent cardiophrenic lymph nodes are noted which are nonspecific. OTHER:  Negative.             CONCLUSION:  1. Small right pleural effusion is increased in size  compared to the prior examination.  The right effusion appears partially loculated.  Right basilar atelectatic changes present.   LOCATION:  [facility]   Dictated by (CST): Duy Fitzpatrick MD on 8/30/2024 at 10:55 AM     Finalized by (CST): Duy Fitzpatrick MD on 8/30/2024 at 11:01 AM       XR CHEST AP PORTABLE  (CPT=71045)    Result Date: 8/30/2024  PROCEDURE:  XR CHEST AP PORTABLE  (CPT=71045)  TECHNIQUE:  AP chest radiograph was obtained.  COMPARISON:  [facility], XR, XR CHEST PA + LAT CHEST (CPT=71046), 8/11/2024, 5:57 PM.  [facility], XR, XR CHEST AP PORTABLE  (CPT=71045), 8/08/2024, 5:00 PM.  INDICATIONS:  rectal bleeding, SOB  PATIENT STATED HISTORY: (As transcribed by Technologist)  Patient had onset of right sided abdominal pain with bloating/swelling to the abdomen, on 9/29/24.  He also complains of shortness of breath.  He denies any nausea, vomiting or specific chest pain.  Patient has a history of mitrovalve surgery in 2022.    FINDINGS:  Stable right-sided dialysis catheter.  Cardiomegaly.  Cardiac valve prosthesis.  Interstitial opacities bilaterally.  Small right-sided pleural effusion right basilar atelectasis/infiltrates.  No pneumothorax.            CONCLUSION:  1. Cardiomegaly with interstitial opacities likely representing edema. 2. Small right-sided pleural effusion with right basilar atelectasis/infiltrates.    LOCATION:  [facility]      Dictated by (CST): Quinn Bernal MD on 8/30/2024 at 9:16 AM     Finalized by (CST): Quinn Bernal MD on 8/30/2024 at 9:22 AM       XR CHEST PA + LAT CHEST (CPT=71046)    Result Date: 8/11/2024  PROCEDURE:  XR CHEST PA + LAT CHEST (CPT=71046)  INDICATIONS:  seen two days ago dx with pnuemonia, increased perla and pain in back and abdomen  COMPARISON:  [facility], XR, XR CHEST AP PORTABLE  (CPT=71045), 8/08/2024, 5:00 PM.  TECHNIQUE:  PA and lateral chest radiographs were obtained.  PATIENT STATED HISTORY: (As transcribed by Technologist)  Patient shares he has  right-sided chest pain and pain with breathing. Recent diagnosis of pneumonia.               CONCLUSION:  Worsening consolidation and effusion in the right lung base.  Central venous catheter tip SVC.  Normal heart size and pulmonary vascularity.  No pneumothorax.   LOCATION:  [facility]   Dictated by (CST): Arlyn Oglesby MD on 8/11/2024 at 6:30 PM     Finalized by (CST): Arlyn Oglesby MD on 8/11/2024 at 6:30 PM       XR CHEST AP PORTABLE  (CPT=71045)    Result Date: 8/8/2024  PROCEDURE:  XR CHEST AP PORTABLE  (CPT=71045)  TECHNIQUE:  AP chest radiograph was obtained.  COMPARISON:  [facility], XR, XR CHEST AP PORTABLE  (CPT=71045), 6/25/2024, 8:53 AM.  [facility], XR, XR CHEST AP PORTABLE  (CPT=71045), 6/14/2024, 5:12 PM.  INDICATIONS:  From [facility]- shortness of breath since last night  PATIENT STATED HISTORY: (As transcribed by Technologist)  Day 3 right side chest pain and short of breath. Port-a-cath insertion 2yrs ago.    FINDINGS:  Right-sided dialysis catheter is stable.  Cardiac size within normal limits.  Small right-sided pleural effusion with right basilar opacity.  No pneumothorax.            CONCLUSION:  Small right-sided pleural effusion with right basilar atelectasis/infiltrates.   LOCATION:  [facility]      Dictated by (CST): Quinn Bernal MD on 8/08/2024 at 5:20 PM     Finalized by (CST): Quinn Bernal MD on 8/08/2024 at 5:20 PM          Operative Procedures:      Code Status: Full Code    Total Time Coordinating Care: Greater than 30 minutes    Patient had opportunity to ask questions and state understand and agree with therapeutic plan as outlined. I reviewed and reconciled current and discharge medications on day of discharge and discussed meds with patient. D/w RN     Yuriy Forrest MD  [facility] Hospitalist  [phone]  9/2/2024  2:08 PM        Electronically signed by Yuriy Forrest MD on 9/2/2024  2:13 PM         REVIEWER COMMENTS

## 2024-09-04 NOTE — PAYOR COMM NOTE
--------------  ADMISSION REVIEW     Payor: [employer]  Subscriber #:  [id number]  Authorization Number: [id number]    Admit date: 8/31/24  Admit time:  2:16 AM       REVIEW DOCUMENTATION:     ED Provider Notes        ED Provider Notes signed by Kerry Sotomayor DO at 8/31/2024  1:57 AM       Author: Kerry Sotomayor DO Service: -- Author Type: Physician    Filed: 8/31/2024  1:57 AM Date of Service: 8/31/2024 12:32 AM Status: Signed    : Kerry Sotomayor DO (Physician)           Patient Seen in: [facility] Emergency Department      History     Chief Complaint   Patient presents with    GI Bleeding    Difficulty Breathing     Missed Friday dialysis  feels  overloaded     Stated Complaint: rectal bleeding and chest pain    Subjective:   HPI    Patient is a 44-year-old male presenting to the ED stating that he is having continued GI bleeding as well as \"fluid overload.\"  The patient was seen earlier today at [facility] for an episode of GI bleeding with a hemoglobin of 8.1.  The patient had 1 other episode this afternoon prompting him to return to the ED for repeat evaluation.  He does feel short of breath.  He missed his dialysis today because he went to the ER.  He typically receives in on Monday, Wednesday, Friday.  He completed it last on Wednesday.  He is not typically on any oxygen.  No complaints of chest pain.  He has had some right sided abdominal pain with his symptoms which was present earlier today.  He did have a CT scan of the abdomen and pelvis performed at that time.  No fevers or chills.  No new cough or URI symptoms.  He does make urine, about 2 or 3 episodes per day.  No anticoagulation.  Patient is able to show me to photos of his bloody stool in the toilet.  There is blood in the toilet water with what appears to be brown or dark-colored stool present.    Objective:   Past Medical History:    Asthma (HCC)    Attention deficit hyperactivity disorder (ADHD)     Back problem    Bipolar 1 disorder (HCC)    CKD (chronic kidney disease) stage 3, GFR 30-59 ml/min (HCC)    Dr Meeks    Congestive heart disease (HCC)    COPD (chronic obstructive pulmonary disease) (HCC)    Coronary atherosclerosis    Deep vein thrombosis (HCC)    at age 17 R/T cast    Depression    Diabetes (HCC)    Essential hypertension    3/21 echo: severe concentric LVH with normal EF and no MR or pHTN    Extrinsic asthma, unspecified    Heart attack (HCC)    2016- angiogram- no intervention    Heart valve disease    mitral valve repair in 1994/    High blood pressure    High cholesterol    History of mitral valve repair    Hyperlipidemia    Low back pain    tight and stiff after sweeping and mopping    LVH (left ventricular hypertrophy)    Migraines    Mixed hyperlipidemia     HDL 38 LDL 97 VLDL 57     Monoclonal gammopathy    IgG kappa     MVP (mitral valve prolapse)    Repair 1994 at [place]; echoes as recently as 3/21 show mild or trivial MR and no stenosis    Neuropathy    Osteoarthritis    hip ,knees    Pneumonia due to organism    Pulmonary embolism (HCC)    Renal disorder    Stroke (HCC)    TIA (transient ischemic attack)    Initial history of left-sided weakness and slurred speech. (+) cocaine. MRI of the brain, CT angiogram of the head and neck, and 2D echo are all unremarkable.     TMJ (dislocation of temporomandibular joint)    Troponin level elevated    Trop 60 60 47 with TIA and no CP: Lexiscan negative with EF 51              Past Surgical History:   Procedure Laterality Date    Av fistula revision, open Left     Colonoscopy N/A 03/26/2023    Procedure: COLONOSCOPY;  Surgeon: Heath Vu MD;  Location:  ENDOSCOPY    Colonoscopy N/A 12/30/2023    Procedure: COLONOSCOPY with cold snare polypectomy and forcep polypectomy;  Surgeon: Ousmane Suarez MD;  Location:  ENDOSCOPY    Colonoscopy & polypectomy  2019    Egd  2019    Duodenitis. Biopsied. EUS for weight loss was negative     Heart surgery      Hernia surgery  08/17/2022    Dr Barnes    Laminectomy,>2 sgmt,lumbar  09/06/2018    L4-L5 Decomp Discectomy ROEM L4-L5    Mitralplasty w cp bypass  1994    [place]: Repair    Repair rotator cuff,chronic Left     torn and had a ruptured bicep    Valve repair  1994    mitral valve           Review of Systems    Positive for stated Chief Complaint: GI Bleeding and Difficulty Breathing (Missed Friday dialysis  feels  overloaded)    Other systems are as noted in HPI.  Constitutional and vital signs reviewed.      All other systems reviewed and negative except as noted above.    Physical Exam     ED Triage Vitals [08/30/24 2032]   /83   Pulse 78   Resp 24   Temp 97.8 °F (36.6 °C)   Temp src Temporal   SpO2 96 %   O2 Device None (Room air)       Current Vitals:   Vital Signs  BP: (!) 151/103  Pulse: 64  Resp: 25  Temp: 97.8 °F (36.6 °C)  Temp src: Temporal  MAP (mmHg): (!) 112    Oxygen Therapy  SpO2: 96 %  O2 Device: Bi-PAP  Mode: Spontaneous/Timed        Physical Exam  Vitals and nursing note reviewed.   Constitutional:       General: He is in acute distress.      Appearance: He is well-developed. He is ill-appearing.      Comments: Patient appears tachypneic and short of breath.  O2 saturation 88 to 99% on room air.  Initially placed on O2 nasal cannula however BiPAP was ordered as patient was breathing through his mouth and remained at approximately 89 to 90%.   HENT:      Head: Normocephalic and atraumatic.   Eyes:      Comments: Normal conjunctiva   Cardiovascular:      Rate and Rhythm: Normal rate and regular rhythm.   Pulmonary:      Effort: Respiratory distress present.      Breath sounds: Rales present. No rhonchi.   Abdominal:      General: Abdomen is flat. Bowel sounds are normal. There is no distension.      Palpations: Abdomen is soft.      Tenderness: There is abdominal tenderness. There is no guarding.      Comments: Minimal tenderness right upper and lower quadrant.    Musculoskeletal:         General: Normal range of motion.      Right lower leg: No edema.      Left lower leg: No edema.   Skin:     General: Skin is warm and dry.      Capillary Refill: Capillary refill takes less than 2 seconds.      Findings: No rash.   Neurological:      General: No focal deficit present.      Mental Status: He is alert and oriented to person, place, and time.   Psychiatric:         Mood and Affect: Mood normal.         Behavior: Behavior normal.         ED Course     Labs Reviewed   COMP METABOLIC PANEL (14) - Abnormal; Notable for the following components:       Result Value    Potassium 5.6 (*)      (*)     Creatinine 10.44 (*)     Calcium, Total 8.4 (*)     Calculated Osmolality 323 (*)     eGFR-Cr 6 (*)     All other components within normal limits   CBC WITH DIFFERENTIAL WITH PLATELET - Abnormal; Notable for the following components:    RBC 2.72 (*)     HGB 8.7 (*)     HCT 27.5 (*)     .1 (*)                         EKG    Rate, intervals and axes as noted on EKG Report.  Rate: 71  Rhythm: Sinus Rhythm  Reading: No significant change when compared to EKG that was performed earlier today at 8:57 AM.           [facility]        CT ABDOMEN+PELVIS(CPT=74176)    Result Date: 8/30/2024  PROCEDURE:  CT ABDOMEN+PELVIS (CPT=74176)  COMPARISON:  [facility], CT, CT ABDOMEN+PELVIS(CONTRAST ONLY)(CPT=74177), 6/25/2024, 7:21 AM.  INDICATIONS:  rectal bleeding, SOB  TECHNIQUE:  Unenhanced multislice CT scanning was performed from the dome of the diaphragm to the pubic symphysis.  Dose reduction techniques were used. Dose information is transmitted to the ACR (American College of Radiology) NRDR (National Radiology Data Registry) which includes the Dose Index Registry.  PATIENT STATED HISTORY: (As transcribed by Technologist)  Patient has rectal bleeding and shortness of breath.    FINDINGS:  LIVER:  No enlargement, atrophy, abnormal density, or significant focal lesion.  BILIARY:  No visible  dilatation or calcification.  PANCREAS:  No lesion, fluid collection, ductal dilatation, or atrophy.  SPLEEN:  No enlargement or focal lesion.  KIDNEYS:  No mass, obstruction, or calcification.  ADRENALS:  No mass or enlargement.  AORTA/VASCULAR:    Unremarkable as seen on non-contrast imaging. RETROPERITONEUM:  No mass or adenopathy.  BOWEL/MESENTERY:  No visible mass, obstruction, or bowel wall thickening.  Appendix is normal. ABDOMINAL WALL:  Fat containing bilateral inguinal hernias are noted.  URINARY BLADDER:  No visible focal wall thickening, lesion, or calculus.  PELVIC NODES:  No adenopathy.  PELVIC ORGANS:  No visible mass.  Pelvic organs appropriate for patient age.  BONES:  No bony lesion or fracture.  LUNG BASES:  Small right effusion has increased in size compared to the prior exam.  Loculated effusion along the anterior aspect of the right hemithorax appears progressed from the prior exam.  Right basilar atelectatic changes noted.  Mildly prominent cardiophrenic lymph nodes are noted which are nonspecific. OTHER:  Negative.             CONCLUSION:  1. Small right pleural effusion is increased in size compared to the prior examination.  The right effusion appears partially loculated.  Right basilar atelectatic changes present.   LOCATION:  [facility]   Dictated by (CST): Duy Fitzpatrick MD on 8/30/2024 at 10:55 AM     Finalized by (CST): Duy Fitzpatrick MD on 8/30/2024 at 11:01 AM       I reviewed all results.  CMP reviewed with a potassium of 5.6.  BUN at 102 and a creatinine of 10.44.  Calcium 8.4.  GFR 6.  LFTs are normal.  CBC reviewed.  Hemoglobin has increased from 8.1 earlier today to 8.7.  WBC at 7.8 without neutrophilic shift.  Patient is afebrile.  No complaints of URI symptoms.  I do not suspect pneumonia.  No additional episodes of GI bleeding in the ED.    I personally reviewed the radiographs and my individual interpretation shows enlarging right pleural effusion.  Pulmonary vascular congestion.   Cardiomegaly.    I also reviewed the official report which shows   XR CHEST AP PORTABLE  (CPT=71045)    Result Date: 8/30/2024  PROCEDURE:  XR CHEST AP PORTABLE  (CPT=71045)  TECHNIQUE:  AP chest radiograph was obtained.  COMPARISON:  [facility], XR, XR CHEST AP PORTABLE  (CPT=71045), 8/30/2024, 8:57 AM.  INDICATIONS:  rectal bleeding and chest pain  PATIENT STATED HISTORY: (As transcribed by Technologist)  rectal bleeding and chest pain.    FINDINGS:  Tunneled right internal jugular hemodialysis catheter remains in place.  Stable cardiomegaly with valve prosthesis.  Mild pulmonary vascular congestion and perihilar interstitial edema.  Increased right pleural effusion and right basilar consolidation/atelectasis.            CONCLUSION:  1. Increased right pleural effusion and right basilar atelectasis/consolidation. 2. Stable vascular congestion interstitial edema suggestive of CHF or fluid overload.  LOCATION:  [facility]      Dictated by (CST): Ricki Zaragoza MD on 8/30/2024 at 11:43 PM     Finalized by (CST): Ricki Zaragoza MD on 8/30/2024 at 11:44 PM       Case was discussed with nephrology from the ED.    Interventions in care included Lasix 40 mg and Dilaudid.  BiPAP.      Admission disposition: 8/30/2024 11:57 PM         Medical Decision Making      Disposition and Plan     Clinical Impression:  1. Hypervolemia, unspecified hypervolemia type    2. End stage renal disease on dialysis (HCC)    3. Gastrointestinal hemorrhage, unspecified gastrointestinal hemorrhage type    4. Acute respiratory failure with hypoxia (HCC)         Disposition:  Admit  8/30/2024 11:57 pm      Hospital Problems       Present on Admission  Date Reviewed: 8/8/2024            ICD-10-CM Noted POA    * (Principal) Hypervolemia, unspecified hypervolemia type E87.70 8/30/2024 Unknown           8/31 H&P           Chief Complaint   Patient presents with    GI Bleeding    Difficulty Breathing       Missed Friday dialysis  feels  overloaded          PCP: Prieto Novak MD     History of Present Illness: Patient is a 44 year old male with PMH significant for ESRD on HD, bipolar, depression, DM 2, HTN, DL, CAD, COPD, HFpEF, chronic abdominal pain presents to the ED c sob and GIB.      Pt currently on bipap and getting HD. Feels knot in his abd. Reports dark stool/clots. 144/93.  Tolerating HD.      Admission: 6/14/2024- 6/17/2024 adfer missed HD                       Scheduled Meds   heparin, 5,000 Units, Q8H MARTHA  ARIPiprazole, 5 mg, Daily  buPROPion ER, 150 mg, Daily  carvedilol, 25 mg, BID with meals  fenofibrate micronized, 134 mg, Nightly  FLUoxetine HCl, 40 mg, Daily  isosorbide mononitrate ER, 30 mg, Daily  lamoTRIgine, 150 mg, Daily  NIFEdipine ER, 60 mg, BID  losartan, 100 mg, Daily  amphetamine-dextroamphetamine, 10 mg, BID AC  sevelamer carbonate, 800 mg, TID CC  piperacillin-tazobactam, 3.375 g, Q12H  hydrocortisone, 25 mg, BID  cloNIDine, 0.1 mg, BID                      Review of Systems  Comprehensive ROS reviewed and negative except for what's stated above. \     OBJECTIVE:  BP (!) 144/115 (BP Location: Left arm)   Pulse 75   Temp 98.1 °F (36.7 °C) (Axillary)   Resp 19   Ht 6' 2\" (1.88 m)   Wt 258 lb 13.1 oz (117.4 kg)   SpO2 95%   BMI 33.23 kg/m²      General:  Alert, no distress, appears stated age.    Head:  Normocephalic, without obvious abnormality, atraumatic.   Eyes:  Sclera anicteric, EOMs intact.    Nose: Nares normal,  Mucosa normal    Throat: Lips normal   Neck: Supple, symmetrical, trachea midline   Lungs:   Clear to auscultation bilaterally. Normal effort   Chest wall:  No tenderness or deformity   Heart:  Regular rate and rhythm, S1, S2 normal, no murmur, rub or gallop appreciated   Abdomen:   Soft, NT/ND, Bowel sounds normal. No masses,  No organomegaly.    Extremities/MSK: Extremities normal/normal movement, atraumatic, no cyanosis  or edema.   Skin: Skin color, texture, turgor normal. No rashes or lesions.    Neurologic:  Moving all extremities spontaneously, no focal deficit appreciated            LABS:         Lab Results   Component Value Date     WBC 11.2 2024     HGB 9.3 2024     HCT 28.3 2024     .0 2024     CREATSERUM 6.77 2024     BUN 67 2024      2024     K 4.0 2024      2024     CO2 24.0 2024     GLU 82 2024     CA 8.7 2024     ALB 4.3 2024     ALKPHO 99 2024     BILT 0.3 2024     TP 7.4 2024     AST 31 2024     ALT 23 2024     PGLU 107 2024            ASSESSMENT / PLAN:     Patient is a 44 year old male with PMH significant for ESRD on HD, bipolar, depression, DM 2, HTN, DL, CAD, COPD, HFpEF, chronic abdominal pain presents to the ED c sob and GIB.      Dark stool  - apprec GI recs  - hgb stable, EGD and colonoscopy reportedly normal earlier this month  - outpatient capsule endoscopy and f/u with Dr. Heath Vu   -anusol suppository bid prn for bleeding   - follow sxs and hgb      Acute respiratory failure  -likely 2/2 pulm edema  - apprec pulm recs  - HD  - empiric zosyn   - consider CTA, tx ICU if worsens      ESRD on HD  -HD T//  -nephrology consulted     Chronic pain  Likely drug use dependance  -Follows with pain clinic outpatient  -Resume home pain medication.  -minimize iv pain meds     Anemia  -chronic - hgb 8.7 to 9.3   -hx of GI bleed     HTN  DL  CAD  HFpEF  -resume home meds     DM2  -hold po meds  -ISS +accuchecks     COPD  -no evidence of acute exacerbation  -resume home inhalers     MDD  Bipolar   -resume home meds     Leukocytosis  -follow, 7.8 to 11.2          Pulmonary consult      Signed       Expand All Collapse All    Pulmonary / Critical Care H&P/Consult         NAME: Godwin Fonseca - ROOM: 2620/2620-A - MRN: [id number] - Age: 44 year old - :  1980     Date of Admission: 2024 10:01 PM  Admission Diagnosis: End stage renal disease on dialysis (HCC)  [N18.6, Z99.2]  Acute respiratory failure with hypoxia ([facility]) [J96.01]  Gastrointestinal hemorrhage, unspecified gastrointestinal hemorrhage type [K92.2]  Hypervolemia, unspecified hypervolemia type [E87.70]     Reason for consult: resp failure         History of Present Illness: 43 yo M with h/o esrd on HD, h/o recurrent gi bleeding who presented with rectal bleeding and dyspnea.  Recent endoscopy as outpatient, was recommended to have capsul endoscopy. Had left osh ama because dialysis was being delayed.  On exam pt is tachynpeic despite bipap.      Past Medical History       Past Medical History:    Asthma ([facility])    Attention deficit hyperactivity disorder (ADHD)    Back problem    Bipolar 1 disorder ([facility])    CKD (chronic kidney disease) stage 3, GFR 30-59 ml/min ([facility])     Dr Meeks    Congestive heart disease ([facility])    COPD (chronic obstructive pulmonary disease) ([facility])    Coronary atherosclerosis    Deep vein thrombosis ([facility])     at age 17 R/T cast    Depression    Diabetes ([facility])    Essential hypertension     3/21 echo: severe concentric LVH with normal EF and no MR or pHTN    Extrinsic asthma, unspecified    Heart attack ([facility])     2016- angiogram- no intervention    Heart valve disease     mitral valve repair in 1994/    High blood pressure    High cholesterol    History of blood transfusion    History of mitral valve repair    Hyperlipidemia    Low back pain     tight and stiff after sweeping and mopping    LVH (left ventricular hypertrophy)    Migraines    Mixed hyperlipidemia      HDL 38 LDL 97 VLDL 57     Monoclonal gammopathy     IgG kappa     MVP (mitral valve prolapse)     Repair 1994 at [place]; echoes as recently as 3/21 show mild or trivial MR and no stenosis    Neuropathy    Osteoarthritis     hip ,knees    Pneumonia due to organism    Pulmonary embolism (HCC)    Renal disorder    Stroke ([facility])    TIA (transient ischemic attack)     Initial history of left-sided weakness and slurred speech.  (+) cocaine. MRI of the brain, CT angiogram of the head and neck, and 2D echo are all unremarkable.     TMJ (dislocation of temporomandibular joint)    Troponin level elevated     Trop 60 60 47 with TIA and no CP: Lexiscan negative with EF 51         Past Surgical History         Past Surgical History:   Procedure Laterality Date    Av fistula revision, open Left      Colonoscopy N/A 2023     Procedure: COLONOSCOPY;  Surgeon: Heath Vu MD;  Location:  ENDOSCOPY    Colonoscopy N/A 2023     Procedure: COLONOSCOPY with cold snare polypectomy and forcep polypectomy;  Surgeon: Ousmane Suarez MD;  Location:  ENDOSCOPY    Colonoscopy & polypectomy       Egd   2019     Duodenitis. Biopsied. EUS for weight loss was negative    Heart surgery        Hernia surgery   2022     Dr Barnes    Laminectomy,>2 sgmt,lumbar   2018     L4-L5 Decomp Discectomy ROEM L4-L5    Mitralplasty w cp bypass        [place]: Repair    Repair rotator cuff,chronic Left       torn and had a ruptured bicep    Valve repair        mitral valve            Allergies:  Allergies        Allergies   Allergen Reactions    Hydrochlorothiazide RASH and HIVES            Social History:  Social History               Socioeconomic History    Marital status:        Spouse name: Not on file    Number of children: Not on file    Years of education: Not on file    Highest education level: Not on file   Occupational History    Not on file   Tobacco Use    Smoking status: Former       Current packs/day: 0.00       Average packs/day: 1 pack/day for 27.0 years (27.0 ttl pk-yrs)       Types: Cigarettes       Start date: 1995       Quit date: 2022       Years since quittin.5       Passive exposure: Never    Smokeless tobacco: Never   Vaping Use    Vaping status: Never Used   Substance and Sexual Activity    Alcohol use: No    Drug use: No    Sexual activity: Not on file   Other Topics Concern    Caffeine  Concern Not Asked    Exercise Not Asked    Seat Belt Not Asked    Special Diet Not Asked    Stress Concern Not Asked    Weight Concern Not Asked   Social History Narrative    Not on file      Social Determinants of Health           Financial Resource Strain: Medium Risk (5/7/2024)     Received from [facility]     Overall Financial Resource Strain (CARDIA)      Difficulty of Paying Living Expenses: Somewhat hard   Food Insecurity: No Food Insecurity (8/31/2024)     Food Insecurity      Food Insecurity: Never true   Transportation Needs: No Transportation Needs (8/31/2024)     Transportation Needs      Lack of Transportation: No      Car Seat: Not on file   Physical Activity: Inactive (5/7/2024)     Received from [facility]     Exercise Vital Sign      Days of Exercise per Week: 0 days      Minutes of Exercise per Session: 0 min   Stress: Stress Concern Present (5/7/2024)     Received from [facility]     [employer] - Occupational Stress Questionnaire      Feeling of Stress : Rather much   Social Connections: Unknown (5/7/2024)     Received from [facility]     Social Connection and Isolation Panel [NHANES]      Frequency of Communication with Friends and Family: More than three times a week      Frequency of Social Gatherings with Friends and Family: More than three times a week      Attends Quaker Services: Never      Active Member of Clubs or Organizations: No      Attends Club or Organization Meetings: Never      Marital Status: Patient unable to answer   Housing Stability: Low Risk  (8/31/2024)     Housing Stability      Housing Instability: No      Housing Instability Emergency: Not on file      Crib or [place]: Not on file            Family History:  Family History         Family History   Problem Relation Age of Onset    Hypertension Father      Alcohol and Other Disorders Associated Father      Substance Abuse Father           cocaine     Dementia Father      Cancer Father           lung    Diabetes Mother      Cancer Mother           multiple myeloma    Hypertension Mother      Anxiety Maternal Aunt      Depression Maternal Aunt      Anxiety Maternal Aunt      Depression Maternal Aunt      Bipolar Disorder Maternal Aunt      Diabetes Maternal Grandmother      Hypertension Maternal Grandmother      Cancer Maternal Grandfather           stomach cancer    Diabetes Maternal Grandfather      Hypertension Maternal Grandfather      Alcohol and Other Disorders Associated Maternal Grandfather      Hypertension Paternal Grandmother      Hypertension Paternal Grandfather      Cancer Sister           uterine and ovarian    Hypertension Sister      Cancer Maternal Uncle           lung    Cancer Paternal Aunt           throat            Home Medications:  Medications Taking          Outpatient Medications Marked as Taking for the 8/30/24 encounter (Hospital Encounter)   Medication Sig Dispense Refill    tamsulosin 0.4 MG Oral Cap Take 1 capsule (0.4 mg total) by mouth daily.        cloNIDine 0.1 MG Oral Tab Take 1 tablet (0.1 mg total) by mouth daily with food.        ARIPiprazole 5 MG Oral Tab Take 1 tablet (5 mg total) by mouth daily.        dicyclomine 10 MG Oral Cap Take 1 capsule (10 mg total) by mouth 3 (three) times daily as needed. 20 capsule 0    isosorbide mononitrate ER 30 MG Oral Tablet 24 Hr Take 1 tablet (30 mg total) by mouth daily. Hold if systolic blood pressure <130        Lisdexamfetamine Dimesylate 60 MG Oral Cap Take 1 capsule (60 mg total) by mouth every morning.        buPROPion  MG Oral Tablet 24 Hr Take 1 tablet (150 mg total) by mouth daily.        lamoTRIgine (LAMICTAL) 150 MG Oral Tab Take 1 tablet (150 mg total) by mouth daily. 7 tablet 0    FLUoxetine HCl 40 MG Oral Cap Take 1 capsule (40 mg total) by mouth daily. 7 capsule 0    RENVELA 800 MG Oral Tab Take 1 tablet (800 mg total) by mouth 3 (three) times daily with meals.         losartan 100 MG Oral Tab Take 1 tablet (100 mg total) by mouth daily. Hold if systolic blood pressure <130        hydrALAZINE 50 MG Oral Tab Take 1 tablet (50 mg total) by mouth in the morning and 1 tablet (50 mg total) before bedtime. Hold if systolic blood pressure <130. 30 tablet 0    NIFEdipine ER 60 MG Oral Tablet 24 Hr Take 1 tablet (60 mg total) by mouth in the morning and 1 tablet (60 mg total) before bedtime. Hold if systolic blood pressure <130.        carvedilol 25 MG Oral Tab Take 1 tablet (25 mg total) by mouth in the morning and 1 tablet (25 mg total) in the evening. Take with meals. 60 tablet 6    Fenofibrate 134 MG Oral Cap Take 1 capsule by mouth nightly. 90 capsule 1    Fluticasone Propionate 50 MCG/ACT Nasal Suspension SPRAY ONCE INTO EACH NOSTRIL BID PRN 15.8 mL 0            Scheduled Medication:  Scheduled Medications    heparin  5,000 Units Subcutaneous Q8H MARTHA    ARIPiprazole  5 mg Oral Daily    buPROPion ER  150 mg Oral Daily    carvedilol  25 mg Oral BID with meals    cloNIDine  0.1 mg Oral Daily with food    fenofibrate micronized  134 mg Oral Nightly    FLUoxetine HCl  40 mg Oral Daily    isosorbide mononitrate ER  30 mg Oral Daily    lamoTRIgine  150 mg Oral Daily    NIFEdipine ER  60 mg Oral BID    losartan  100 mg Oral Daily    amphetamine-dextroamphetamine  10 mg Oral BID AC    sevelamer carbonate  800 mg Oral TID CC         Continuous Infusing Medication:  Medication Infusions        PRN Medication:  PRN Medications     acetaminophen    acetaminophen **OR** HYDROcodone-acetaminophen **OR** HYDROcodone-acetaminophen    melatonin    ondansetron    prochlorperazine    ipratropium-albuterol    sodium chloride **AND** albumin human         REVIEW OF SYSTEMS:   Reviewed and negative except per HPI     OBJECTIVE:  Vitals          Vitals:     08/31/24 0230 08/31/24 0247 08/31/24 0346 08/31/24 0400   BP: (!) 155/112     (!) 151/114   BP Location: Right arm     Left arm   Pulse: 73   74 74    Resp: (!) 29     (!) 27   Temp: 98.2 °F (36.8 °C)     98.2 °F (36.8 °C)   TempSrc: Axillary     Axillary   SpO2: 96%   98% 95%   Weight: 258 lb 13.1 oz (117.4 kg) 258 lb 13.1 oz (117.4 kg)       Height:                 O2: on bipap                   Wt Readings from Last 3 Encounters:   08/31/24 258 lb 13.1 oz (117.4 kg)   08/30/24 250 lb (113.4 kg)   08/11/24 240 lb (108.9 kg)            Intake/Output Summary (Last 24 hours) at 8/31/2024 0920  Last data filed at 8/31/2024 0430      Gross per 24 hour   Intake --   Output 400 ml   Net -400 ml         BP (!) 151/114 (BP Location: Left arm)   Pulse 74   Temp 98.2 °F (36.8 °C) (Axillary)   Resp (!) 27   Ht 6' 2\" (1.88 m)   Wt 258 lb 13.1 oz (117.4 kg)   SpO2 95%   BMI 33.23 kg/m²      General Appearance:    Awake, tachypneic, on bipap    Head:    Normocephalic, without obvious abnormality, atraumatic   Eyes:    PERRL, conjunctiva/corneas clear   Neck:   Supple, symmetrical, trachea midline, no adenopathy;        thyroid:  No enlargement/tenderness/nodules; no carotid    bruit or JVD   Back:     Symmetric, no curvature, ROM normal, no CVA tenderness   Lungs:     Crackles bilat    Chest wall:    No tenderness or deformity   Heart:    Regular rate and rhythm, S1 and S2 normal, no murmur, rub   or gallop   Abdomen:     Soft, non-tender, bowel sounds active all four quadrants,     no masses, no organomegaly   Extremities:   Extremities normal, atraumatic, no cyanosis or edema   Pulses:   2+ and symmetric all extremities   Skin:   Skin color, texture, turgor normal, no rashes or lesions              Recent Labs   Lab 08/30/24  0840 08/30/24  2310   WBC 9.1 7.8   HGB 8.1* 8.7*   HCT 25.6* 27.5*   .0 256.0          Recent Labs   Lab 08/30/24  0840   INR 1.05              Recent Labs   Lab 08/30/24  0840 08/30/24  2310    141   K 4.7 5.6*    110   CO2 19.0* 25.0   * 102*   CREATSERUM 11.40* 10.44*   * 90   CA 8.0* 8.4*   AST 17 31   ALT  25 23   ALKPHO 86 99   BILT 0.3 0.3   ALB 3.5 4.3   TP 7.1 7.4                CREATININE (mg/dL)   Date Value   12/14/2012 1.1          Creatinine (mg/dL)   Date Value   08/30/2024 10.44 (H)   08/30/2024 11.40 (H)   08/08/2024 8.30 (H)   03/07/2022 2.87 (H)   10/11/2021 2.54 (H)   08/30/2021 2.72 (H)   ]           Imaging: cxr: c/w pulm edema and small R effusion      ASSESSMENT/PLAN:     Acute resp failure: suspect due to pulm edema. At risk for PE given recent hospitalizations as well as hap   - will start empiric zosyn   - dialysis per renal   - may require intubation given current resp status. Will transfer to medical icu, intensivist notified.   - consider ct angio when stabilized.   Esrd:   - hd per renal   Gi bleed; recurrent rectal bleeding, has had scopes recently without clear etiology   - gi consulted.   - follow hgb, transfuse for < 7   Fen: npo   Dispo: transfer to icu. D/w intensivist and floor nurse.                      9/1 IM      Chief Complaint: follow-up   Follow up for: The primary encounter diagnosis was Hypervolemia, unspecified hypervolemia type. Diagnoses of End stage renal disease on dialysis (HCC), Gastrointestinal hemorrhage, unspecified gastrointestinal hemorrhage type, and Acute respiratory failure with hypoxia (HCC) were also pertinent to this visit.     Overnight/Interim Events:        SUBJECTIVE:  On 5L. Napping. Pain in abd still. Dec appetite for a few months. Denies any missed HD.      OBJECTIVE:  Temp:  [97.7 °F (36.5 °C)-98.1 °F (36.7 °C)] 98 °F (36.7 °C)  Pulse:  [72-84] 79  Resp:  [16-20] 19  BP: (114-144)/(64-96) 114/82  SpO2:  [90 %-95 %] 95 %     Intake/Output:     Intake/Output Summary (Last 24 hours) at 9/1/2024 1527  Last data filed at 9/1/2024 1303      Gross per 24 hour   Intake 240 ml   Output 1100 ml   Net -860 ml              Last 3 Weights   09/01/24 0425 257 lb 8 oz (116.8 kg)   08/31/24 0247 258 lb 13.1 oz (117.4 kg)   08/31/24 0230 258 lb 13.1 oz (117.4 kg)    08/30/24 2032 250 lb (113.4 kg)   08/30/24 0829 250 lb (113.4 kg)   08/11/24 1701 240 lb (108.9 kg)         Exam     General: Alert, no distress, appears stated age.     Head:  Normocephalic, without obvious abnormality, atraumatic.   Eyes:  Sclera anicteric, EOMs intact.    Nose: Nares normal,  Mucosa normal    Throat: Lips normal   Neck: Supple, symmetrical, trachea midline   Lungs:   Clear to auscultation bilaterally. Normal effort   Chest wall:  No tenderness or deformity   Heart:  Regular rate and rhythm, S1, S2 normal, no murmur, rub or gallop appreciated   Abdomen:   Soft, NT/ND, Bowel sounds normal. No masses,  No organomegaly.    Extremities: Extremities normal, atraumatic, no cyanosis or LE edema.   Skin: Skin color, texture, turgor normal. No rashes or lesions.    Neurologic: Moving all extremities spontaneously, no focal deficit appreciated      Data Review:       Labs:             Recent Labs   Lab 08/30/24 0840 08/30/24 2310 08/31/24 1017 09/01/24  0719   WBC 9.1 7.8 11.2* 5.6   HGB 8.1* 8.7* 9.3* 9.2*   .4* 101.1* 101.1* 99.3   .0 256.0 261.0 230.0   INR 1.05  --   --   --                 Recent Labs   Lab 08/30/24 0840 08/30/24 2310 08/31/24 1017 09/01/24  0719    141 142 141   K 4.7 5.6* 4.0 4.0    110 106 104   CO2 19.0* 25.0 24.0 25.0   * 102* 67* 58*   CREATSERUM 11.40* 10.44* 6.77* 7.38*   CA 8.0* 8.4* 8.7 8.3*   MG 2.8*  --   --  2.4   PHOS  --   --   --  7.5*   * 90 82 133*               Recent Labs   Lab 08/30/24 0840 08/30/24 2310 09/01/24  0719   ALT 25 23  --    AST 17 31  --    ALB 3.5 4.3 4.2                Recent Labs   Lab 08/31/24  0354 08/31/24  2119 09/01/24  0617 09/01/24  1303   PGLU 107* 98 127* 112*         No results for input(s): \"TROP\" in the last 168 hours.        Meds:      Scheduled Medications    sevelamer carbonate  1,600 mg Oral TID CC    heparin  5,000 Units Subcutaneous Q8H MARTHA    ARIPiprazole  5 mg Oral Daily     buPROPion ER  150 mg Oral Daily    carvedilol  25 mg Oral BID with meals    fenofibrate micronized  134 mg Oral Nightly    FLUoxetine HCl  40 mg Oral Daily    isosorbide mononitrate ER  30 mg Oral Daily    lamoTRIgine  150 mg Oral Daily    NIFEdipine ER  60 mg Oral BID    losartan  100 mg Oral Daily    amphetamine-dextroamphetamine  10 mg Oral BID AC    piperacillin-tazobactam  3.375 g Intravenous Q12H    hydrocortisone  25 mg Rectal BID    cloNIDine  0.1 mg Oral BID    insulin aspart  1-5 Units Subcutaneous TID AC and HS         Medication Infusions           PRN Medications     sodium chloride **AND** albumin human    acetaminophen    acetaminophen **OR** HYDROcodone-acetaminophen **OR** HYDROcodone-acetaminophen    melatonin    ondansetron    prochlorperazine    ipratropium-albuterol    sodium chloride **AND** albumin human    HYDROmorphone **OR** HYDROmorphone **OR** HYDROmorphone    glucose **OR** glucose **OR** glucose-vitamin C **OR** dextrose **OR** glucose **OR** glucose **OR** glucose-vitamin C            Assessment/Plan:       44 year old male with PMH significant for ESRD on HD, bipolar, depression, DM 2, HTN, DL, CAD, COPD, HFpEF, chronic abdominal pain presents to the ED c sob and GIB.      Dark stool  - apprec GI recs  - hgb stable, EGD and colonoscopy reportedly normal earlier this month  - outpatient capsule endoscopy and f/u with Dr. Heath Vu   -anusol suppository bid prn for bleeding   - follow sxs and hgb      Acute respiratory failure  -likely 2/2 pulm edema  - apprec pulm recs  - HD  - empiric zosyn, repeat CXR tmrw, if improved, stop abx  - consider CTA, tx ICU if worsens   - was on BIPAP, now 5L     ESRD on HD  -HD T/TH/Sa  -nephrology consulted     Chronic pain  Likely drug use dependance  -Follows with pain clinic outpatient  -Resume home pain medication.  -minimize iv pain meds     Anemia  -chronic - hgb 8.7 to 9.3 to 9.2   -hx of GI bleed     HTN  DL  CAD  HFpEF  -resume home meds      DM2  -hold po meds  -ISS +accuchecks     COPD  -no evidence of acute exacerbation  -resume home inhalers     MDD  Bipolar   -resume home meds     Leukocytosis  -follow, 7.8 to 11.2 to 5.6     9/1 Pulmonary    Pulm / Critical Care Progress Note     S: much better after dialysis yesterday       Scheduled Medication:  Scheduled Medications    heparin  5,000 Units Subcutaneous Q8H MARTHA    ARIPiprazole  5 mg Oral Daily    buPROPion ER  150 mg Oral Daily    carvedilol  25 mg Oral BID with meals    fenofibrate micronized  134 mg Oral Nightly    FLUoxetine HCl  40 mg Oral Daily    isosorbide mononitrate ER  30 mg Oral Daily    lamoTRIgine  150 mg Oral Daily    NIFEdipine ER  60 mg Oral BID    losartan  100 mg Oral Daily    amphetamine-dextroamphetamine  10 mg Oral BID AC    sevelamer carbonate  800 mg Oral TID CC    piperacillin-tazobactam  3.375 g Intravenous Q12H    hydrocortisone  25 mg Rectal BID    cloNIDine  0.1 mg Oral BID    insulin aspart  1-5 Units Subcutaneous TID AC and HS         Continuous Infusing Medication:  Medication Infusions        PRN Medication:  PRN Medications     acetaminophen    acetaminophen **OR** HYDROcodone-acetaminophen **OR** HYDROcodone-acetaminophen    melatonin    ondansetron    prochlorperazine    ipratropium-albuterol    sodium chloride **AND** albumin human    HYDROmorphone **OR** HYDROmorphone **OR** HYDROmorphone    glucose **OR** glucose **OR** glucose-vitamin C **OR** dextrose **OR** glucose **OR** glucose **OR** glucose-vitamin C            OBJECTIVE:  Vitals          Vitals:     08/31/24 2330 09/01/24 0425 09/01/24 0728 09/01/24 0802   BP: (!) 144/96 133/64   120/67   BP Location: Right arm Radial   Right arm   Pulse: 72 78 84 79   Resp: 16 20   19   Temp: 97.9 °F (36.6 °C) 98 °F (36.7 °C)   97.7 °F (36.5 °C)   TempSrc: Oral Oral   Oral   SpO2: 94% 93% 93% 92%   Weight:   257 lb 8 oz (116.8 kg)       Height:                    O2: 5L nc           Wt Readings from Last 3  Encounters:   09/01/24 257 lb 8 oz (116.8 kg)   08/30/24 250 lb (113.4 kg)   08/11/24 240 lb (108.9 kg)         I/O last 3 completed shifts:  In: -   Out: 4800 [Urine:800; Other:4000]  I/O this shift:  In: -   Out: 700 [Urine:700]     Physical Exam:               General: alert, cooperative,  No respiratory distress.               Head: Normocephalic, without obvious abnormality, atraumatic.               Lungs: ctab               Chest wall: No tenderness or deformity.               Heart: Regular rate and rhythm, normal S1S2, no murmur.               Abdomen: soft, non-tender, non-distended, positive BS.               Extremity: no edema           Recent Labs   Lab 08/30/24 2310 08/31/24  1017 09/01/24  0719   WBC 7.8 11.2* 5.6   HGB 8.7* 9.3* 9.2*   HCT 27.5* 28.3* 28.3*   .0 261.0 230.0          Recent Labs   Lab 08/30/24  0840   INR 1.05                   Recent Labs   Lab 08/30/24  0840 08/30/24  2310 08/31/24  1017 09/01/24  0719    141 142 141   K 4.7 5.6* 4.0 4.0    110 106 104   CO2 19.0* 25.0 24.0 25.0   * 102* 67* 58*   CREATSERUM 11.40* 10.44* 6.77* 7.38*   * 90 82 133*   CA 8.0* 8.4* 8.7 8.3*   AST 17 31  --   --    ALT 25 23  --   --    ALKPHO 86 99  --   --    BILT 0.3 0.3  --   --    ALB 3.5 4.3  --  4.2   TP 7.1 7.4  --   --              CREATININE (mg/dL)   Date Value   12/14/2012 1.1          Creatinine (mg/dL)   Date Value   09/01/2024 7.38 (H)   08/31/2024 6.77 (H)   08/30/2024 10.44 (H)   03/07/2022 2.87 (H)   10/11/2021 2.54 (H)   08/30/2021 2.72 (H)   ]         ASSESSMENT/PLAN:     Acute resp failure: suspect due to pulm edema. At risk for PE given recent hospitalizations as well as hap; given improvement with dialysis fluid is the most likely explanation   -  started empiric zosyn 8/31  - dialysis per renal   - repeat cxr tomorrow; if notably improved then would stop abx.   Esrd:   - hd per renal   Gi bleed; recurrent rectal bleeding, has had scopes  recently without clear etiology   - gi consulted.   - follow hgb, transfuse for < 7   Fen: npo   Dispo: ctu      MEDICATIONS ADMINISTERED IN LAST 1 DAY:  Administration History        HYDROmorphone (Dilaudid) 1 MG/ML injection 0.5 mg  Dose: 0.5 mg  Freq: Once Route: IV  Start: 08/30/24 2359 End: 08/31/24 0014    0014 EW-Given                 piperacillin-tazobactam (Zosyn) 3.375 g in dextrose 5% 100 mL IVPB-ADDV  Dose: 3.375 g  Freq: Every 12 hours Route: IV  Last Dose: 3.375 g (09/01/24 2155)  Start: 08/31/24 0945 End: 09/02/24 0853   Admin Instructions:   Incompatible with Lactated Ringers (LR)   Order specific questions:       1409 AC-New Bag   2041 AW-New Bag       0857 AC-New Bag   2155 ML-New Bag       0853-D/C'd            Or   HYDROcodone-acetaminophen (Norco) 5-325 MG per tab 2 tablet  Dose: 2 tablet  Freq: Every 4 hours PRN Route: OR  PRN Reason: severe pain  Start: 08/31/24 0228 End: 09/02/24 2033   Admin Instructions:   Use PRN reason as a guide and follow range order policy    0521 AW-Given         0909 KD-Given   2033-D/C'd           Or   HYDROmorphone (Dilaudid) 1 MG/ML injection 0.4 mg  Dose: 0.4 mg  Freq: Every 6 hours PRN Route: IV  PRN Reason: severe pain  Start: 08/31/24 1524 End: 09/02/24 2033   Admin Instructions:   Use PRN reason as a guide and follow range order policy. If oral pain meds are ordered and patient can tolerate oral intake, start with PRN oral pain medications first.       2038 AW-Given       0214 AW-Given   0857 AC-Given   1555 AC-Given     2154 ML-Given        0643 ML-Given   1219 KD-Given   2033-D/C'd          Or   HYDROmorphone (Dilaudid) 1 MG/ML injection 0.4 mg  Dose: 0.4 mg  Freq: Every 2 hour PRN Route: IV  PRN Reason: severe pain  Start: 08/31/24 0921 End: 08/31/24 1524   Admin Instructions:   Use PRN reason as a guide and follow range order policy. If oral pain meds are ordered and patient can tolerate oral intake, start with PRN oral pain medications first.    3264  AC-Given   1410 AC-Given   1524-D/C'd             ondansetron (Zofran) 4 MG/2ML injection 4 mg  Dose: 4 mg  Freq: Every 6 hours PRN Route: IV  PRN Reasons: Nausea,vomiting  Start: 08/31/24 0228 End: 09/02/24 2033   Admin Instructions:   Default antiemetic sequence (unless otherwise preferred by patient):  1. ondansetron (Zofran)  2. prochlorperazine (Compazine). Wait 15 minutes before proceeding to next medication in sequence.  Follow therapeutic duplication policy.    0623 AW-Given         2033-D/C'd                     Vitals (last day) before discharge       Date/Time Temp Pulse Resp BP SpO2 Weight O2 Device O2 Flow Rate (L/min) Who    09/02/24 1615 98 °F (36.7 °C) 83 20 156/100 98 % -- None (Room air) -- NC    09/02/24 1220 97.8 °F (36.6 °C) 85 18 137/92 95 % -- None (Room air) 0 L/min KD    09/02/24 0836 97.7 °F (36.5 °C) 75 19 128/69 96 % -- None (Room air) -- CM    09/02/24 0512 97.1 °F (36.2 °C) 82 22 127/89 94 % 248 lb 8 oz (112.7 kg) -- -- AA    09/02/24 0512 -- -- -- -- -- -- None (Room air) -- ML    09/02/24 0100 -- 71 -- -- 93 % -- None (Room air) -- ML    09/02/24 0006 96.7 °F (35.9 °C) 73 20 120/81 97 % -- Nasal cannula 4 L/min AA    09/01/24 1944 97.2 °F (36.2 °C) 73 18 120/49 97 % -- Nasal cannula 4 L/min ML    09/01/24 1624 97.5 °F (36.4 °C) 75 -- 102/60 92 % -- Nasal cannula 5 L/min SZ    09/01/24 1303 98 °F (36.7 °C) 79 19 114/82 95 % -- Nasal cannula 5 L/min NN    09/01/24 0802 97.7 °F (36.5 °C) 79 19 120/67 92 % -- Nasal cannula 5 L/min     09/01/24 0728 -- 84 -- -- 93 % -- -- --     09/01/24 0425 98 °F (36.7 °C) 78 20 133/64 93 % 257 lb 8 oz (116.8 kg) Nasal cannula 5 L/min             08/31/24 2330 97.9 °F (36.6 °C) 72 16 144/96 Abnormal  94 % -- Nasal cannula 5 L/min    08/31/24 1924 98.1 °F (36.7 °C) 79 20 134/93 Abnormal  94 % -- Nasal cannula 5 L/min    08/31/24 1643 98.1 °F (36.7 °C) 81 19 129/72 90 % -- Nasal cannula 5 L/min    08/31/24 1624 -- -- -- -- -- -- Nasal cannula 5  L/min KT   08/31/24 1211 98.1 °F (36.7 °C) 75 19 144/115 Abnormal  95 % -- Bi-PAP -- NN   08/31/24 1209 -- 77 -- -- 95 % -- -- -- EI   08/31/24 0926 97 °F (36.1 °C) 77 20 160/103 Abnormal  93 % -- Bi-PAP -- NN   08/31/24 0906 -- -- -- -- 94 % -- -- -- EI   08/31/24 0400 98.2 °F (36.8 °C) 74 27 Abnormal  151/114 Abnormal  95 % -- Bi-PAP -- KB   08/31/24 0346 -- 74 -- -- 98 % -- -- -- SB   08/31/24 0247 -- -- -- -- -- 258 lb 13.1 oz (117.4 kg) -- -- AW   08/31/24 0230 98.2 °F (36.8 °C) 73 29 Abnormal  155/112 Abnormal  96 % 258 lb 13.1 oz (117.4 kg) Bi-PAP -- KB            08/30/24 2330 -- 72 25 151/103 Abnormal  100 % -- Bi-PAP -- MM   08/30/24 2320 -- -- -- -- -- -- None (Room air) -- MM   08/30/24 2315 -- 72 35 Abnormal  -- 92 % -- None (Room air) -- MM   08/30/24 2245 -- 71 29 Abnormal  141/100 Abnormal  91 % -- None (Room air) -- MM   08/30/24 2032 97.8 °F (36.6 °C) 78 24 144/83 96 % 250 lb (113.4 kg) None (Room air) -- CARMINE      Latest Reference Range & Units 09/01/24 07:19 09/02/24 05:31   Glucose 70 - 99 mg/dL 133 (H) 102 (H)   Sodium 136 - 145 mmol/L 141 141   Potassium 3.5 - 5.1 mmol/L 4.0 3.9   Chloride 98 - 112 mmol/L 104 107   Carbon Dioxide, Total 21.0 - 32.0 mmol/L 25.0 23.0   BUN 9 - 23 mg/dL 58 (H) 66 (H)   CREATININE 0.70 - 1.30 mg/dL 7.38 (H) 7.76 (H)   CALCIUM 8.7 - 10.4 mg/dL 8.3 (L) 8.1 (L)   EGFR >=60 mL/min/1.73m2 9 (L) 8 (L)   ANION GAP 0 - 18 mmol/L 12 11   CALCULATED OSMOLALITY 275 - 295 mOsm/kg 310 (H) 311 (H)      Latest Reference Range & Units 09/01/24 07:19 09/02/24 05:31   PHOSPHORUS 2.4 - 5.1 mg/dL 7.5 (H) 6.3 (H)   (H): Data is abnormally high     Latest Reference Range & Units 09/01/24 07:19 09/02/24 12:56   WBC 4.0 - 11.0 x10(3) uL 5.6 4.5   Hemoglobin 13.0 - 17.5 g/dL 9.2 (L) 8.3 (L)   Hematocrit 39.0 - 53.0 % 28.3 (L) 24.4 (L)   Platelet Count 150.0 - 450.0 10(3)uL 230.0 194.0   RBC 4.30 - 5.70 x10(6)uL 2.85 (L) 2.52 (L)   MCH 26.0 - 34.0 pg 32.3 32.9   MCHC 31.0 - 37.0 g/dL 32.5  34.0   MCV 80.0 - 100.0 fL 99.3 96.8   RDW % 16.4 15.9   Prelim Neutrophil Abs 1.50 - 7.70 x10 (3) uL 3.56 2.78   Neutrophils Absolute 1.50 - 7.70 x10(3) uL 3.56 2.78   (L): Data is abnormally low

## 2024-09-05 NOTE — CDS QUERY
Dear Dr. Forrest,     Can the diagnosis of pulmonary edema be further specified?     [   x] Acute Pulmonary Edema resulting in exacerbation of HFpEF  [   ] Acute Pulmonary Edema without exacerbation of HFpEF  [   ] Other (please specify):     Clinical Indicators:     8/31 ED Provider: Differential diagnosis includes fluid overload secondary to missed dialysis, CHF, anemia with or without high-output failure.  Missed dialysis today, feels overloaded.     8/31 H&P: Acute respiratory failure - likely 2/2 pulm edema    8/31 Pulmonology Consult: Acute resp failure: suspect due to pulm edema     8/31 Nephrology Consult: ESRD:  -- HD today with 4L UF  -- possible isolated UF tomorrow pending respiratory status  Hypoxia:  -- CXR with pleural effusions and interstitial edema    9/2 Discharge Summary: Acute respiratory failure - likely 2/2 pulm edema      Risk Factors: ESRD on HD.  Chronic HFpEF.  Anemia.  DM2.  COPD     Treatment: Hemodialysis     For questions regarding this query, please contact Lead Clinical :   Ermelinda Larose RN, CCDS   Cell# [phone]   Thank you!                                                              THIS FORM IS A PERMANENT PART OF THE MEDICAL RECORD   normal performance

## 2024-09-15 ENCOUNTER — HOSPITAL ENCOUNTER (INPATIENT)
Facility: HOSPITAL | Age: 44
LOS: 5 days | Discharge: HOME | End: 2024-09-20
Attending: EMERGENCY MEDICINE | Admitting: INTERNAL MEDICINE
Payer: MEDICARE

## 2024-09-15 ENCOUNTER — APPOINTMENT (OUTPATIENT)
Dept: GENERAL RADIOLOGY | Age: 44
End: 2024-09-15
Attending: EMERGENCY MEDICINE
Payer: MEDICARE

## 2024-09-15 ENCOUNTER — APPOINTMENT (OUTPATIENT)
Dept: CT IMAGING | Age: 44
End: 2024-09-15
Attending: EMERGENCY MEDICINE
Payer: MEDICARE

## 2024-09-15 DIAGNOSIS — R10.9 ABDOMINAL PAIN, ACUTE: ICD-10-CM

## 2024-09-15 DIAGNOSIS — R79.89 ELEVATED TROPONIN: ICD-10-CM

## 2024-09-15 DIAGNOSIS — I50.9 ACUTE ON CHRONIC CONGESTIVE HEART FAILURE, UNSPECIFIED HEART FAILURE TYPE (HCC): ICD-10-CM

## 2024-09-15 DIAGNOSIS — N18.6 STAGE 5 CHRONIC KIDNEY DISEASE ON CHRONIC DIALYSIS (HCC): Primary | ICD-10-CM

## 2024-09-15 DIAGNOSIS — Z99.2 STAGE 5 CHRONIC KIDNEY DISEASE ON CHRONIC DIALYSIS (HCC): Primary | ICD-10-CM

## 2024-09-15 DIAGNOSIS — M51.16 LUMBAR DISC PROLAPSE WITH COMPRESSION RADICULOPATHY: ICD-10-CM

## 2024-09-15 DIAGNOSIS — D64.9 ANEMIA, UNSPECIFIED TYPE: ICD-10-CM

## 2024-09-15 DIAGNOSIS — E87.5 HYPERKALEMIA: ICD-10-CM

## 2024-09-15 LAB
ALBUMIN SERPL-MCNC: 3.5 G/DL (ref 3.4–5)
ALBUMIN/GLOB SERPL: 1.1 {RATIO} (ref 1–2)
ALP LIVER SERPL-CCNC: 66 U/L
ALT SERPL-CCNC: 25 U/L
ANION GAP SERPL CALC-SCNC: 9 MMOL/L (ref 0–18)
APTT PPP: 27.8 SECONDS (ref 23–36)
AST SERPL-CCNC: 55 U/L (ref 15–37)
BASOPHILS # BLD AUTO: 0.07 X10(3) UL (ref 0–0.2)
BASOPHILS NFR BLD AUTO: 0.9 %
BILIRUB SERPL-MCNC: 0.6 MG/DL (ref 0.1–2)
BUN BLD-MCNC: 104 MG/DL (ref 9–23)
CALCIUM BLD-MCNC: 8.5 MG/DL (ref 8.5–10.1)
CHLORIDE SERPL-SCNC: 111 MMOL/L (ref 98–112)
CO2 SERPL-SCNC: 19 MMOL/L (ref 21–32)
CREAT BLD-MCNC: 11.4 MG/DL
EGFRCR SERPLBLD CKD-EPI 2021: 5 ML/MIN/1.73M2 (ref 60–?)
EOSINOPHIL # BLD AUTO: 0.1 X10(3) UL (ref 0–0.7)
EOSINOPHIL NFR BLD AUTO: 1.3 %
ERYTHROCYTE [DISTWIDTH] IN BLOOD BY AUTOMATED COUNT: 15.4 %
GLOBULIN PLAS-MCNC: 3.3 G/DL (ref 2.8–4.4)
GLUCOSE BLD-MCNC: 98 MG/DL (ref 70–99)
HCT VFR BLD AUTO: 27.5 %
HGB BLD-MCNC: 9.2 G/DL
IMM GRANULOCYTES # BLD AUTO: 0.01 X10(3) UL (ref 0–1)
IMM GRANULOCYTES NFR BLD: 0.1 %
INR BLD: 1.11 (ref 0.8–1.2)
LIPASE SERPL-CCNC: 135 U/L (ref 12–53)
LYMPHOCYTES # BLD AUTO: 1.15 X10(3) UL (ref 1–4)
LYMPHOCYTES NFR BLD AUTO: 14.5 %
MCH RBC QN AUTO: 31.9 PG (ref 26–34)
MCHC RBC AUTO-ENTMCNC: 33.5 G/DL (ref 31–37)
MCV RBC AUTO: 95.5 FL
MONOCYTES # BLD AUTO: 0.74 X10(3) UL (ref 0.1–1)
MONOCYTES NFR BLD AUTO: 9.3 %
NEUTROPHILS # BLD AUTO: 5.85 X10 (3) UL (ref 1.5–7.7)
NEUTROPHILS # BLD AUTO: 5.85 X10(3) UL (ref 1.5–7.7)
NEUTROPHILS NFR BLD AUTO: 73.9 %
NT-PROBNP SERPL-MCNC: ABNORMAL PG/ML (ref ?–125)
OSMOLALITY SERPL CALC.SUM OF ELEC: 321 MOSM/KG (ref 275–295)
PLATELET # BLD AUTO: 231 10(3)UL (ref 150–450)
POTASSIUM SERPL-SCNC: 5.6 MMOL/L (ref 3.5–5.1)
PROT SERPL-MCNC: 6.8 G/DL (ref 6.4–8.2)
PROTHROMBIN TIME: 14.3 SECONDS (ref 11.6–14.8)
RBC # BLD AUTO: 2.88 X10(6)UL
SARS-COV-2 RNA RESP QL NAA+PROBE: NOT DETECTED
SODIUM SERPL-SCNC: 139 MMOL/L (ref 136–145)
TROPONIN I SERPL HS-MCNC: 552 NG/L
WBC # BLD AUTO: 7.9 X10(3) UL (ref 4–11)

## 2024-09-15 PROCEDURE — 83880 ASSAY OF NATRIURETIC PEPTIDE: CPT | Performed by: EMERGENCY MEDICINE

## 2024-09-15 PROCEDURE — 93005 ELECTROCARDIOGRAM TRACING: CPT

## 2024-09-15 PROCEDURE — 83690 ASSAY OF LIPASE: CPT | Performed by: EMERGENCY MEDICINE

## 2024-09-15 PROCEDURE — 74176 CT ABD & PELVIS W/O CONTRAST: CPT | Performed by: EMERGENCY MEDICINE

## 2024-09-15 PROCEDURE — 99285 EMERGENCY DEPT VISIT HI MDM: CPT

## 2024-09-15 PROCEDURE — 96374 THER/PROPH/DIAG INJ IV PUSH: CPT

## 2024-09-15 PROCEDURE — 71045 X-RAY EXAM CHEST 1 VIEW: CPT | Performed by: EMERGENCY MEDICINE

## 2024-09-15 PROCEDURE — 96376 TX/PRO/DX INJ SAME DRUG ADON: CPT

## 2024-09-15 PROCEDURE — 85025 COMPLETE CBC W/AUTO DIFF WBC: CPT | Performed by: EMERGENCY MEDICINE

## 2024-09-15 PROCEDURE — 84484 ASSAY OF TROPONIN QUANT: CPT | Performed by: HOSPITALIST

## 2024-09-15 PROCEDURE — 84484 ASSAY OF TROPONIN QUANT: CPT | Performed by: EMERGENCY MEDICINE

## 2024-09-15 PROCEDURE — 80053 COMPREHEN METABOLIC PANEL: CPT | Performed by: EMERGENCY MEDICINE

## 2024-09-15 PROCEDURE — 85730 THROMBOPLASTIN TIME PARTIAL: CPT | Performed by: EMERGENCY MEDICINE

## 2024-09-15 PROCEDURE — 85610 PROTHROMBIN TIME: CPT | Performed by: EMERGENCY MEDICINE

## 2024-09-15 PROCEDURE — 93010 ELECTROCARDIOGRAM REPORT: CPT

## 2024-09-15 PROCEDURE — 80061 LIPID PANEL: CPT | Performed by: EMERGENCY MEDICINE

## 2024-09-15 PROCEDURE — 96375 TX/PRO/DX INJ NEW DRUG ADDON: CPT

## 2024-09-15 RX ORDER — SENNOSIDES 8.6 MG
17.2 TABLET ORAL NIGHTLY PRN
Status: DISCONTINUED | OUTPATIENT
Start: 2024-09-15 | End: 2024-09-20

## 2024-09-15 RX ORDER — ALBUTEROL SULFATE 90 UG/1
2 INHALANT RESPIRATORY (INHALATION) EVERY 6 HOURS PRN
Status: ON HOLD | COMMUNITY

## 2024-09-15 RX ORDER — ONDANSETRON 2 MG/ML
4 INJECTION INTRAMUSCULAR; INTRAVENOUS EVERY 4 HOURS PRN
Status: DISPENSED | OUTPATIENT
Start: 2024-09-15 | End: 2024-09-16

## 2024-09-15 RX ORDER — ISOSORBIDE MONONITRATE 30 MG/1
30 TABLET, EXTENDED RELEASE ORAL DAILY
Status: DISCONTINUED | OUTPATIENT
Start: 2024-09-15 | End: 2024-09-20

## 2024-09-15 RX ORDER — DICYCLOMINE HYDROCHLORIDE 10 MG/1
10 CAPSULE ORAL 3 TIMES DAILY PRN
Status: DISCONTINUED | OUTPATIENT
Start: 2024-09-15 | End: 2024-09-20

## 2024-09-15 RX ORDER — HYDROMORPHONE HYDROCHLORIDE 1 MG/ML
0.5 INJECTION, SOLUTION INTRAMUSCULAR; INTRAVENOUS; SUBCUTANEOUS EVERY 30 MIN PRN
Status: COMPLETED | OUTPATIENT
Start: 2024-09-15 | End: 2024-09-15

## 2024-09-15 RX ORDER — LEVOFLOXACIN 250 MG/1
TABLET, FILM COATED ORAL
Status: ON HOLD | COMMUNITY
Start: 2024-09-05

## 2024-09-15 RX ORDER — HEPARIN SODIUM 5000 [USP'U]/ML
5000 INJECTION, SOLUTION INTRAVENOUS; SUBCUTANEOUS EVERY 8 HOURS SCHEDULED
Status: DISCONTINUED | OUTPATIENT
Start: 2024-09-15 | End: 2024-09-20

## 2024-09-15 RX ORDER — ARIPIPRAZOLE 5 MG/1
5 TABLET ORAL DAILY
Status: DISCONTINUED | OUTPATIENT
Start: 2024-09-15 | End: 2024-09-16

## 2024-09-15 RX ORDER — CLONIDINE HYDROCHLORIDE 0.1 MG/1
0.1 TABLET ORAL ONCE
Status: COMPLETED | OUTPATIENT
Start: 2024-09-15 | End: 2024-09-15

## 2024-09-15 RX ORDER — TIOTROPIUM BROMIDE 18 UG/1
18 CAPSULE ORAL; RESPIRATORY (INHALATION) DAILY
Status: ON HOLD | COMMUNITY

## 2024-09-15 RX ORDER — LISDEXAMFETAMINE DIMESYLATE 30 MG/1
60 CAPSULE ORAL EVERY MORNING
Status: DISCONTINUED | OUTPATIENT
Start: 2024-09-16 | End: 2024-09-16

## 2024-09-15 RX ORDER — HYDROMORPHONE HYDROCHLORIDE 1 MG/ML
INJECTION, SOLUTION INTRAMUSCULAR; INTRAVENOUS; SUBCUTANEOUS
Status: COMPLETED
Start: 2024-09-15 | End: 2024-09-15

## 2024-09-15 RX ORDER — CLONIDINE HYDROCHLORIDE 0.1 MG/1
0.1 TABLET ORAL
Status: DISCONTINUED | OUTPATIENT
Start: 2024-09-16 | End: 2024-09-16

## 2024-09-15 RX ORDER — BISACODYL 10 MG
10 SUPPOSITORY, RECTAL RECTAL
Status: DISCONTINUED | OUTPATIENT
Start: 2024-09-15 | End: 2024-09-20

## 2024-09-15 RX ORDER — HYDRALAZINE HYDROCHLORIDE 50 MG/1
50 TABLET, FILM COATED ORAL 2 TIMES DAILY
Status: DISCONTINUED | OUTPATIENT
Start: 2024-09-15 | End: 2024-09-20

## 2024-09-15 RX ORDER — HYDROCODONE BITARTRATE AND ACETAMINOPHEN 5; 325 MG/1; MG/1
1 TABLET ORAL EVERY 4 HOURS PRN
Status: DISCONTINUED | OUTPATIENT
Start: 2024-09-15 | End: 2024-09-20

## 2024-09-15 RX ORDER — NIFEDIPINE 60 MG/1
60 TABLET, EXTENDED RELEASE ORAL 2 TIMES DAILY
Status: DISCONTINUED | OUTPATIENT
Start: 2024-09-15 | End: 2024-09-20

## 2024-09-15 RX ORDER — FUROSEMIDE 10 MG/ML
40 INJECTION INTRAMUSCULAR; INTRAVENOUS ONCE
Status: COMPLETED | OUTPATIENT
Start: 2024-09-15 | End: 2024-09-15

## 2024-09-15 RX ORDER — CARVEDILOL 12.5 MG/1
25 TABLET ORAL 2 TIMES DAILY WITH MEALS
Status: DISCONTINUED | OUTPATIENT
Start: 2024-09-16 | End: 2024-09-20

## 2024-09-15 RX ORDER — LOSARTAN POTASSIUM 100 MG/1
100 TABLET ORAL DAILY
Status: DISCONTINUED | OUTPATIENT
Start: 2024-09-15 | End: 2024-09-20

## 2024-09-15 RX ORDER — FLUTICASONE PROPIONATE 50 MCG
1 SPRAY, SUSPENSION (ML) NASAL 2 TIMES DAILY
Status: DISCONTINUED | OUTPATIENT
Start: 2024-09-15 | End: 2024-09-20

## 2024-09-15 RX ORDER — LEVOFLOXACIN 250 MG/1
250 TABLET, FILM COATED ORAL
Status: DISCONTINUED | OUTPATIENT
Start: 2024-09-16 | End: 2024-09-20

## 2024-09-15 RX ORDER — HYDROCODONE BITARTRATE AND ACETAMINOPHEN 5; 325 MG/1; MG/1
2 TABLET ORAL EVERY 4 HOURS PRN
Status: DISCONTINUED | OUTPATIENT
Start: 2024-09-15 | End: 2024-09-20

## 2024-09-15 RX ORDER — ACETAMINOPHEN 500 MG
500 TABLET ORAL EVERY 4 HOURS PRN
Status: DISCONTINUED | OUTPATIENT
Start: 2024-09-15 | End: 2024-09-20

## 2024-09-15 RX ORDER — BUPROPION HYDROCHLORIDE 150 MG/1
150 TABLET ORAL DAILY
Status: DISCONTINUED | OUTPATIENT
Start: 2024-09-15 | End: 2024-09-20

## 2024-09-15 RX ORDER — FENOFIBRATE 134 MG/1
134 CAPSULE ORAL NIGHTLY
Status: DISCONTINUED | OUTPATIENT
Start: 2024-09-15 | End: 2024-09-20

## 2024-09-15 RX ORDER — ONDANSETRON 2 MG/ML
4 INJECTION INTRAMUSCULAR; INTRAVENOUS EVERY 6 HOURS PRN
Status: DISCONTINUED | OUTPATIENT
Start: 2024-09-15 | End: 2024-09-20

## 2024-09-15 RX ORDER — PROCHLORPERAZINE EDISYLATE 5 MG/ML
5 INJECTION INTRAMUSCULAR; INTRAVENOUS EVERY 8 HOURS PRN
Status: DISCONTINUED | OUTPATIENT
Start: 2024-09-15 | End: 2024-09-20

## 2024-09-15 RX ORDER — ACETAMINOPHEN 325 MG/1
650 TABLET ORAL EVERY 4 HOURS PRN
Status: DISCONTINUED | OUTPATIENT
Start: 2024-09-15 | End: 2024-09-20

## 2024-09-15 RX ORDER — SEVELAMER CARBONATE 800 MG/1
800 TABLET, FILM COATED ORAL
Status: DISCONTINUED | OUTPATIENT
Start: 2024-09-16 | End: 2024-09-20

## 2024-09-15 RX ORDER — HYDROMORPHONE HYDROCHLORIDE 1 MG/ML
0.5 INJECTION, SOLUTION INTRAMUSCULAR; INTRAVENOUS; SUBCUTANEOUS EVERY 30 MIN PRN
Status: ACTIVE | OUTPATIENT
Start: 2024-09-15 | End: 2024-09-16

## 2024-09-15 RX ORDER — POLYETHYLENE GLYCOL 3350 17 G/17G
17 POWDER, FOR SOLUTION ORAL DAILY PRN
Status: DISCONTINUED | OUTPATIENT
Start: 2024-09-15 | End: 2024-09-20

## 2024-09-15 RX ORDER — TAMSULOSIN HYDROCHLORIDE 0.4 MG/1
0.4 CAPSULE ORAL DAILY
Status: DISCONTINUED | OUTPATIENT
Start: 2024-09-15 | End: 2024-09-20

## 2024-09-16 LAB
ANION GAP SERPL CALC-SCNC: 13 MMOL/L (ref 0–18)
ATRIAL RATE: 90 BPM
BASOPHILS # BLD AUTO: 0.09 X10(3) UL (ref 0–0.2)
BASOPHILS NFR BLD AUTO: 1.2 %
BILIRUB UR QL STRIP.AUTO: NEGATIVE
BUN BLD-MCNC: 102 MG/DL (ref 9–23)
CALCIUM BLD-MCNC: 8.9 MG/DL (ref 8.7–10.4)
CHLORIDE SERPL-SCNC: 109 MMOL/L (ref 98–112)
CHOLEST SERPL-MCNC: 133 MG/DL (ref ?–200)
CLARITY UR REFRACT.AUTO: CLEAR
CO2 SERPL-SCNC: 18 MMOL/L (ref 21–32)
COLOR UR AUTO: COLORLESS
CREAT BLD-MCNC: 10.05 MG/DL
EGFRCR SERPLBLD CKD-EPI 2021: 6 ML/MIN/1.73M2 (ref 60–?)
EOSINOPHIL # BLD AUTO: 0.27 X10(3) UL (ref 0–0.7)
EOSINOPHIL NFR BLD AUTO: 3.5 %
ERYTHROCYTE [DISTWIDTH] IN BLOOD BY AUTOMATED COUNT: 15.1 %
GLUCOSE BLD-MCNC: 101 MG/DL (ref 70–99)
GLUCOSE UR STRIP.AUTO-MCNC: NORMAL MG/DL
HCT VFR BLD AUTO: 27.9 %
HDLC SERPL-MCNC: 44 MG/DL (ref 40–59)
HGB BLD-MCNC: 9.3 G/DL
IMM GRANULOCYTES # BLD AUTO: 0.01 X10(3) UL (ref 0–1)
IMM GRANULOCYTES NFR BLD: 0.1 %
KETONES UR STRIP.AUTO-MCNC: NEGATIVE MG/DL
LDLC SERPL CALC-MCNC: 71 MG/DL (ref ?–100)
LEUKOCYTE ESTERASE UR QL STRIP.AUTO: NEGATIVE
LYMPHOCYTES # BLD AUTO: 1.52 X10(3) UL (ref 1–4)
LYMPHOCYTES NFR BLD AUTO: 19.8 %
MCH RBC QN AUTO: 32.3 PG (ref 26–34)
MCHC RBC AUTO-ENTMCNC: 33.3 G/DL (ref 31–37)
MCV RBC AUTO: 96.9 FL
MONOCYTES # BLD AUTO: 0.73 X10(3) UL (ref 0.1–1)
MONOCYTES NFR BLD AUTO: 9.5 %
NEUTROPHILS # BLD AUTO: 5.06 X10 (3) UL (ref 1.5–7.7)
NEUTROPHILS # BLD AUTO: 5.06 X10(3) UL (ref 1.5–7.7)
NEUTROPHILS NFR BLD AUTO: 65.9 %
NITRITE UR QL STRIP.AUTO: NEGATIVE
NONHDLC SERPL-MCNC: 89 MG/DL (ref ?–130)
OSMOLALITY SERPL CALC.SUM OF ELEC: 322 MOSM/KG (ref 275–295)
P AXIS: 34 DEGREES
P-R INTERVAL: 192 MS
PH UR STRIP.AUTO: 6 [PH] (ref 5–8)
PLATELET # BLD AUTO: 211 10(3)UL (ref 150–450)
POTASSIUM SERPL-SCNC: 5 MMOL/L (ref 3.5–5.1)
PROT UR STRIP.AUTO-MCNC: 50 MG/DL
Q-T INTERVAL: 384 MS
QRS DURATION: 88 MS
QTC CALCULATION (BEZET): 469 MS
R AXIS: 24 DEGREES
RBC # BLD AUTO: 2.88 X10(6)UL
SODIUM SERPL-SCNC: 140 MMOL/L (ref 136–145)
SP GR UR STRIP.AUTO: 1.01 (ref 1–1.03)
T AXIS: 33 DEGREES
TRIGL SERPL-MCNC: 92 MG/DL (ref 30–149)
TROPONIN I SERPL HS-MCNC: 469 NG/L
TROPONIN I SERPL HS-MCNC: 505 NG/L
UROBILINOGEN UR STRIP.AUTO-MCNC: NORMAL MG/DL
VENTRICULAR RATE: 90 BPM
VLDLC SERPL CALC-MCNC: 14 MG/DL (ref 0–30)
WBC # BLD AUTO: 7.7 X10(3) UL (ref 4–11)

## 2024-09-16 PROCEDURE — 97161 PT EVAL LOW COMPLEX 20 MIN: CPT

## 2024-09-16 PROCEDURE — P9047 ALBUMIN (HUMAN), 25%, 50ML: HCPCS | Performed by: STUDENT IN AN ORGANIZED HEALTH CARE EDUCATION/TRAINING PROGRAM

## 2024-09-16 PROCEDURE — 5A1D70Z PERFORMANCE OF URINARY FILTRATION, INTERMITTENT, LESS THAN 6 HOURS PER DAY: ICD-10-PCS | Performed by: INTERNAL MEDICINE

## 2024-09-16 PROCEDURE — 97530 THERAPEUTIC ACTIVITIES: CPT

## 2024-09-16 PROCEDURE — 80048 BASIC METABOLIC PNL TOTAL CA: CPT | Performed by: HOSPITALIST

## 2024-09-16 PROCEDURE — 85025 COMPLETE CBC W/AUTO DIFF WBC: CPT | Performed by: HOSPITALIST

## 2024-09-16 PROCEDURE — 84484 ASSAY OF TROPONIN QUANT: CPT | Performed by: HOSPITALIST

## 2024-09-16 PROCEDURE — 94640 AIRWAY INHALATION TREATMENT: CPT

## 2024-09-16 PROCEDURE — 81001 URINALYSIS AUTO W/SCOPE: CPT | Performed by: INTERNAL MEDICINE

## 2024-09-16 PROCEDURE — 90935 HEMODIALYSIS ONE EVALUATION: CPT | Performed by: STUDENT IN AN ORGANIZED HEALTH CARE EDUCATION/TRAINING PROGRAM

## 2024-09-16 RX ORDER — ALBUMIN (HUMAN) 12.5 G/50ML
25 SOLUTION INTRAVENOUS
Status: DISPENSED | OUTPATIENT
Start: 2024-09-16 | End: 2024-09-18

## 2024-09-16 RX ORDER — ALBUTEROL SULFATE 90 UG/1
2 INHALANT RESPIRATORY (INHALATION) EVERY 6 HOURS PRN
Status: DISCONTINUED | OUTPATIENT
Start: 2024-09-16 | End: 2024-09-20

## 2024-09-16 RX ORDER — HYDRALAZINE HYDROCHLORIDE 20 MG/ML
10 INJECTION INTRAMUSCULAR; INTRAVENOUS EVERY 6 HOURS PRN
Status: DISCONTINUED | OUTPATIENT
Start: 2024-09-16 | End: 2024-09-20

## 2024-09-16 RX ORDER — HEPARIN SODIUM 1000 [USP'U]/ML
1.5 INJECTION, SOLUTION INTRAVENOUS; SUBCUTANEOUS
Status: DISCONTINUED | OUTPATIENT
Start: 2024-09-16 | End: 2024-09-20

## 2024-09-16 RX ORDER — CLONIDINE HYDROCHLORIDE 0.1 MG/1
0.1 TABLET ORAL 2 TIMES DAILY
Status: DISCONTINUED | OUTPATIENT
Start: 2024-09-16 | End: 2024-09-20

## 2024-09-16 RX ORDER — HYDROMORPHONE HYDROCHLORIDE 1 MG/ML
0.5 INJECTION, SOLUTION INTRAMUSCULAR; INTRAVENOUS; SUBCUTANEOUS EVERY 6 HOURS PRN
Status: DISCONTINUED | OUTPATIENT
Start: 2024-09-16 | End: 2024-09-20

## 2024-09-16 NOTE — PLAN OF CARE
Assumed care at 8:00 AM  Patient is A&Ox4, NSR, RA  Up to chair and hallway  Tolerating pain management   Renal MD consulted and hemodialysis completed  Patient has no concerns at this time and call light within reach    Plan for blood pressure, pain, and fluid management

## 2024-09-16 NOTE — PROGRESS NOTES
NURSING ADMISSION NOTE      Patient admitted via Cart  Oriented to room.  Safety precautions initiated.  Bed in low position.  Call light in reach.    Pt admitted from [facility] to [id number].  Pt A/O  x 4. Reports severe lower abd pain.  Denies any urine output since Lasix given in ED.  BP still elevated. NSR/ST on tele. O2 sats > 94% on RA.   Admission navigator completed. Hospitalist  notified that med rec was updated and completed.   Meds modified per orders and meds given,  Renal consult in place for HD in am.   Pt updated on poc. All questions and concerns addressed. Will cont to monitor.

## 2024-09-16 NOTE — CONSULTS
[facility] - Nephrology  Inpatient Consultation    Godwin Fonseca Patient Status:  Inpatient    1980 MRN [id number]   [facility] 7NE-A Attending Phong Freedman, DO   Hosp Day # 1 PCP Prieto Novak MD     Reason for consult: ESRD on HD  Date of consultation: 2024     HISTORY OF PRESENT ILLNESS:     oGdwin Fonseca is a 44 year old male with a medical history notable for ESRD on HD MWF, diabetes, hypertension, who presented with nausea and vomiting with shortness of breath. Diagnosed with prostatitis and currently on antibiotics. Missed his normal dialysis session on Friday due to illness. Feeling fluid overloaded. On admission, no acute indication for dialysis. Nephrology consulted for further workup and management.    REVIEW OF SYSTEMS:     ROS as above, otherwise negative    HISTORY:     Past Medical History:    Asthma (HCC)    Attention deficit hyperactivity disorder (ADHD)    Back problem    Bipolar 1 disorder (HCC)    CKD (chronic kidney disease) stage 3, GFR 30-59 ml/min (HCC)    Dr Meeks    Congestive heart disease (HCC)    COPD (chronic obstructive pulmonary disease) (HCC)    Coronary atherosclerosis    Deep vein thrombosis ([facility])    at age 17 R/T cast    Depression    Diabetes (HCC)    Essential hypertension    3/21 echo: severe concentric LVH with normal EF and no MR or pHTN    Extrinsic asthma, unspecified    Heart attack (HCC)    - angiogram- no intervention    Heart valve disease    mitral valve repair in /    High blood pressure    High cholesterol    History of blood transfusion    History of mitral valve repair    Hyperlipidemia    Low back pain    tight and stiff after sweeping and mopping    LVH (left ventricular hypertrophy)    Migraines    Mixed hyperlipidemia     HDL 38 LDL 97 VLDL 57     Monoclonal gammopathy    IgG kappa     MVP (mitral valve prolapse)    Repair  at [facility]; echoes as recently as 3/21 show mild or trivial MR and no  stenosis    Neuropathy    Osteoarthritis    hip ,knees    Pneumonia due to organism    Pulmonary embolism (HCC)    Renal disorder    Stroke (HCC)    TIA (transient ischemic attack)    Initial history of left-sided weakness and slurred speech. (+) cocaine. MRI of the brain, CT angiogram of the head and neck, and 2D echo are all unremarkable.     TMJ (dislocation of temporomandibular joint)    Troponin level elevated    Trop 60 60 47 with TIA and no CP: Lexiscan negative with EF 51     Past Surgical History:   Procedure Laterality Date    Av fistula revision, open Left     Colonoscopy N/A 03/26/2023    Procedure: COLONOSCOPY;  Surgeon: Heath Vu MD;  Location:  ENDOSCOPY    Colonoscopy N/A 12/30/2023    Procedure: COLONOSCOPY with cold snare polypectomy and forcep polypectomy;  Surgeon: Ousmane Suarez MD;  Location:  ENDOSCOPY    Colonoscopy & polypectomy  2019    Egd  2019    Duodenitis. Biopsied. EUS for weight loss was negative    Heart surgery      Hernia surgery  08/17/2022    Dr Barnes    Laminectomy,>2 sgmt,lumbar  09/06/2018    L4-L5 Decomp Discectomy ROEM L4-L5    Mitralplasty w cp bypass  1994    [place]: Repair    Repair rotator cuff,chronic Left     torn and had a ruptured bicep    Valve repair  1994    mitral valve     Family History   Problem Relation Age of Onset    Hypertension Father     Alcohol and Other Disorders Associated Father     Substance Abuse Father         cocaine    Dementia Father     Cancer Father         lung    Diabetes Mother     Cancer Mother         multiple myeloma    Hypertension Mother     Anxiety Maternal Aunt     Depression Maternal Aunt     Anxiety Maternal Aunt     Depression Maternal Aunt     Bipolar Disorder Maternal Aunt     Diabetes Maternal Grandmother     Hypertension Maternal Grandmother     Cancer Maternal Grandfather         stomach cancer    Diabetes Maternal Grandfather     Hypertension Maternal Grandfather     Alcohol and Other Disorders Associated  Maternal Grandfather     Hypertension Paternal Grandmother     Hypertension Paternal Grandfather     Cancer Sister         uterine and ovarian    Hypertension Sister     Cancer Maternal Uncle         lung    Cancer Paternal Aunt         throat      reports that he quit smoking about 2 years ago. His smoking use included cigarettes. He started smoking about 29 years ago. He has a 27 pack-year smoking history. He has never been exposed to tobacco smoke. He has never used smokeless tobacco. He reports that he does not drink alcohol and does not use drugs.  Allergies   Allergen Reactions    Hydrochlorothiazide RASH and HIVES       MEDICATIONS:       Current Facility-Administered Medications:     ARIPiprazole (Abilify) tab 15 mg, 15 mg, Oral, Daily    albuterol (Ventolin HFA) 108 (90 Base) MCG/ACT inhaler 2 puff, 2 puff, Inhalation, Q6H PRN    umeclidinium bromide (Incruse Ellipta) 62.5 MCG/ACT inhaler 1 puff, 1 puff, Inhalation, Daily    hydrALAzine (Apresoline) 20 mg/mL injection 10 mg, 10 mg, Intravenous, Q6H PRN    amphetamine-dextroamphetamine (Adderall) tab 15 mg, 15 mg, Oral, BID AC    HYDROmorphone (Dilaudid) 1 MG/ML injection 0.5 mg, 0.5 mg, Intravenous, Q6H PRN    sodium chloride 0.9 % IV bolus 100 mL, 100 mL, Intravenous, Q30 Min PRN **AND** albumin human (Albumin) 25% injection 25 g, 25 g, Intravenous, PRN Dialysis    heparin (Porcine) 1000 UNIT/ML injection 1,500 Units, 1.5 mL, Intracatheter, PRN Dialysis    cloNIDine (Catapres) tab 0.1 mg, 0.1 mg, Oral, Daily with food    buPROPion ER (Wellbutrin XL) 24 hr tab 150 mg, 150 mg, Oral, Daily    carvedilol (Coreg) tab 25 mg, 25 mg, Oral, BID with meals    dicyclomine (Bentyl) cap 10 mg, 10 mg, Oral, TID PRN    fenofibrate micronized (Lofibra) cap 134 mg, 134 mg, Oral, Nightly    FLUoxetine (PROzac) cap 40 mg, 40 mg, Oral, Daily    fluticasone propionate (Flonase) 50 MCG/ACT nasal suspension 1 spray, 1 spray, Each Nare, BID    hydrALAZINE (Apresoline) tab 50 mg,  50 mg, Oral, BID    isosorbide mononitrate ER (Imdur) 24 hr tab 30 mg, 30 mg, Oral, Daily    lamoTRIgine (LaMICtal) tab 150 mg, 150 mg, Oral, Daily    levoFLOXacin (Levaquin) tab 250 mg, 250 mg, Oral, Q48HR @ 0700    losartan (Cozaar) tab 100 mg, 100 mg, Oral, Daily    NIFEdipine ER (Procardia-XL) 24 hr tab 60 mg, 60 mg, Oral, BID    sevelamer carbonate (Renvela) tab 800 mg, 800 mg, Oral, TID CC    tamsulosin (Flomax) cap 0.4 mg, 0.4 mg, Oral, Daily    heparin (Porcine) 5000 UNIT/ML injection 5,000 Units, 5,000 Units, Subcutaneous, Q8H MARTHA    acetaminophen (Tylenol Extra Strength) tab 500 mg, 500 mg, Oral, Q4H PRN    acetaminophen (Tylenol) tab 650 mg, 650 mg, Oral, Q4H PRN **OR** HYDROcodone-acetaminophen (Norco) 5-325 MG per tab 1 tablet, 1 tablet, Oral, Q4H PRN **OR** HYDROcodone-acetaminophen (Norco) 5-325 MG per tab 2 tablet, 2 tablet, Oral, Q4H PRN    polyethylene glycol (PEG 3350) (Miralax) 17 g oral packet 17 g, 17 g, Oral, Daily PRN    sennosides (Senokot) tab 17.2 mg, 17.2 mg, Oral, Nightly PRN    bisacodyl (Dulcolax) 10 MG rectal suppository 10 mg, 10 mg, Rectal, Daily PRN    ondansetron (Zofran) 4 MG/2ML injection 4 mg, 4 mg, Intravenous, Q6H PRN    prochlorperazine (Compazine) 10 MG/2ML injection 5 mg, 5 mg, Intravenous, Q8H PRN    Medications Prior to Admission   Medication Sig Dispense Refill Last Dose    levoFLOXacin 250 MG Oral Tab TAKE 2 TABLETs BY MOUTH one time then take 1 tablet every 48 hours for 30 days   9/14/2024 at 0600    albuterol 108 (90 Base) MCG/ACT Inhalation Aero Soln Inhale 2 puffs into the lungs every 6 (six) hours as needed for Wheezing.       tiotropium 18 MCG Inhalation Cap Inhale 1 capsule (18 mcg total) into the lungs daily.   9/15/2024 at 0600    tamsulosin 0.4 MG Oral Cap Take 1 capsule (0.4 mg total) by mouth daily.   9/14/2024 at 2100    cloNIDine 0.1 MG Oral Tab Take 1 tablet (0.1 mg total) by mouth daily with food.   9/15/2024 at 0600    ARIPiprazole 5 MG Oral Tab Take 3  tablets (15 mg total) by mouth daily.   9/15/2024 at 0600    dicyclomine 10 MG Oral Cap Take 1 capsule (10 mg total) by mouth 3 (three) times daily as needed. 20 capsule 0 Taking    isosorbide mononitrate ER 30 MG Oral Tablet 24 Hr Take 1 tablet (30 mg total) by mouth daily. Hold if systolic blood pressure <130   9/15/2024 at 0600    Lisdexamfetamine Dimesylate 60 MG Oral Cap Take 70 mg by mouth every morning.   9/15/2024 at 0600    buPROPion  MG Oral Tablet 24 Hr Take 1 tablet (150 mg total) by mouth daily.   9/15/2024 at 0600    lamoTRIgine (LAMICTAL) 150 MG Oral Tab Take 1 tablet (150 mg total) by mouth daily. 7 tablet 0 9/15/2024 at 0600    FLUoxetine HCl 40 MG Oral Cap Take 1 capsule (40 mg total) by mouth daily. 7 capsule 0 9/15/2024 at 0600    RENVELA 800 MG Oral Tab Take 1 tablet (800 mg total) by mouth 3 (three) times daily with meals.   9/15/2024 at 0600    losartan 100 MG Oral Tab Take 1 tablet (100 mg total) by mouth daily. Hold if systolic blood pressure <130   9/15/2024 at 0600    hydrALAZINE 50 MG Oral Tab Take 1 tablet (50 mg total) by mouth in the morning and 1 tablet (50 mg total) before bedtime. Hold if systolic blood pressure <130. 30 tablet 0 9/15/2024 at 0600    NIFEdipine ER 60 MG Oral Tablet 24 Hr Take 1 tablet (60 mg total) by mouth in the morning and 1 tablet (60 mg total) before bedtime. Hold if systolic blood pressure <130.   9/15/2024 at 0600    carvedilol 25 MG Oral Tab Take 1 tablet (25 mg total) by mouth in the morning and 1 tablet (25 mg total) in the evening. Take with meals. 60 tablet 6 9/15/2024 at 0600    Fenofibrate 134 MG Oral Cap Take 1 capsule by mouth nightly. 90 capsule 1 9/14/2024 at 2100    Fluticasone Propionate 50 MCG/ACT Nasal Suspension SPRAY ONCE INTO EACH NOSTRIL BID PRN 15.8 mL 0 Taking    hydrocortisone 25 MG Rectal Suppos Place 1 suppository (25 mg total) rectally 2 (two) times daily. 60 suppository 0         PHYSICAL EXAM:     Vital Signs: /74 (BP  Location: Right arm)   Pulse 85   Temp 97.8 °F (36.6 °C) (Oral)   Resp 17   Ht 6' 2\" (1.88 m)   Wt 245 lb (111.1 kg)   SpO2 92%   BMI 31.46 kg/m²   Temp (24hrs), Av °F (36.7 °C), Min:97.3 °F (36.3 °C), Max:98.8 °F (37.1 °C)       Intake/Output Summary (Last 24 hours) at 2024 1612  Last data filed at 2024 1200  Gross per 24 hour   Intake 960 ml   Output 1450 ml   Net -490 ml     Wt Readings from Last 3 Encounters:   09/15/24 245 lb (111.1 kg)   24 248 lb 8 oz (112.7 kg)   24 250 lb (113.4 kg)       General: no acute distress  HEENT: normocephalic, atraumatic  CV: RRR  Respiratory: no respiratory distress  Abdomen: soft, non-tender  Extremities: no edema bilaterally  Skin: warm, dry  Neuro: awake, alert    LABORATORY DATA:     Lab Results   Component Value Date     (H) 2024     (H) 2024    BUNCREA 13.0 2022    CREATSERUM 10.05 (H) 2024    ANIONGAP 13 2024    GFR 59 (L) 2018    GFRNAA 30 (L) 2022    GFRAA 35 (L) 2022    CA 8.9 2024    OSMOCALC 322 (H) 2024    ALKPHO 66 09/15/2024    AST 55 (H) 09/15/2024    ALT 25 09/15/2024    BILT 0.6 09/15/2024    TP 6.8 09/15/2024    ALB 3.5 09/15/2024    GLOBULIN 3.3 09/15/2024     2024    K 5.0 2024     2024    CO2 18.0 (L) 2024     Lab Results   Component Value Date    WBC 7.7 2024    RBC 2.88 (L) 2024    HGB 9.3 (L) 2024    HCT 27.9 (L) 2024    .0 2024    MPV 11.5 2012    MCV 96.9 2024    MCH 32.3 2024    MCHC 33.3 2024    RDW 15.1 2024    NEPRELIM 5.06 2024    NEPERCENT 65.9 2024    LYPERCENT 19.8 2024    MOPERCENT 9.5 2024    EOPERCENT 3.5 2024    BAPERCENT 1.2 2024    NE 5.06 2024    LYMABS 1.52 2024    MOABSO 0.73 2024    EOABSO 0.27 2024    BAABSO 0.09 2024     Lab Results   Component Value Date     MALBP 167.00 05/24/2023    CREUR 142.00 05/24/2023    CREUR 142.00 05/24/2023     Lab Results   Component Value Date    COLORUR Colorless (A) 09/16/2024    CLARITY Clear 09/16/2024    SPECGRAVITY 1.012 09/16/2024    GLUUR Normal 09/16/2024    BILUR Negative 09/16/2024    KETUR Negative 09/16/2024    BLOODURINE 3+ (A) 09/16/2024    PHURINE 6.0 09/16/2024    PROUR 50 (A) 09/16/2024    UROBILINOGEN Normal 09/16/2024    NITRITE Negative 09/16/2024    LEUUR Negative 09/16/2024    WBCUR 1-5 09/16/2024    RBCUR 0-2 09/16/2024    EPIUR Few (A) 09/16/2024    BACUR None Seen 09/16/2024    CAOXUR Occasional (A) 04/20/2018    HYLUR Present (A) 05/14/2019       IMAGING:     Reviewed    ASSESSMENT:      # ESRD on HD  [facility]  [facility]     # HTN/Volume Status    # Hyperkalemia     # Metabolic acidosis     # Anemia     # Secondary Hyperparathyroidism     PLAN:      -HD today per MWF schedule, may need additional session tomorrow - will evaluate tomorrow  -UF with HD as tolerated  -Continue home BP medications - change clonidine to 0.1mg BID to avoid rebound  -Defer OWEN in setting of high BPs  -Continue sevelamer, will check phosphorus levels  -Avoid nephrotoxins and renally dose medications for creatinine clearance  -Monitor intake and output daily  -Daily weights            We will continue to follow.    Thank you for allowing us to participate in the care of this patient.         Samantha Muñoz DO  [facility] - Nephrology

## 2024-09-16 NOTE — DIETARY NOTE
[facility]   part of [facility]    NUTRITION ASSESSMENT    Pt does not meet malnutrition criteria at this time.      NUTRITION INTERVENTION:    Meal and Snacks - Monitor and encourage adequate PO intake. Liberalized to 2 gm cardiac diet  Medical Food Supplements - Nepro BID vanilla. Rationale/use for oral supplements discussed.    PATIENT STATUS: 09/16/24 44/M admitted d/t SOB, rectal bleeding, and abd pain. Pt screened d/t at risk consult and MST. Pt reported appetite being low for a few months,  only eating 1-2 meals/day. Stated he lost 10 lbs over the last few weeks with dry wt normally being 245-250 lbs. States he is experiencing N/D. Breakfast was delivered at time of visit. Offered Nepro BID and accepted. Pt diet liberalized to encourage more intake.     PMH: ESRD on HD (M/W/F), bipolar, depression, DM 2, HTN, DL, CAD, COPD, HF       ANTHROPOMETRICS:  Ht: 188 cm (6' 2\")  Wt: 111.1 kg (245 lb).   BMI: Body mass index is 31.46 kg/m².  IBW: 86.3 kg      WEIGHT HISTORY:   Weight loss: Pt reported 10 lb wt loss over the last few weeks, but states he is fluid overloaded right now.Dry wt normally 245-250 lbs.    Wt Readings from Last 10 Encounters:   09/15/24 111.1 kg (245 lb)   09/02/24 112.7 kg (248 lb 8 oz)   08/30/24 113.4 kg (250 lb)   08/11/24 108.9 kg (240 lb)   08/08/24 108.9 kg (240 lb)   06/25/24 108.9 kg (240 lb)   06/23/24 108.9 kg (240 lb)   06/16/24 111.8 kg (246 lb 7.6 oz)   05/14/24 108 kg (238 lb)   05/13/24 108 kg (238 lb)        NUTRITION:  Diet:       Procedures    Renal diet Renal; Is Patient on Accuchecks? No      Food Allergies: No  Cultural/Ethnic/Advent Preferences Addressed: Yes    Percent Meals Eaten (last 3 days)       None            GI system review: diarrhea and nausea Last BM Date: 09/15/24  Skin and wounds: skin intact    NUTRITION RELATED PHYSICAL FINDINGS:     1. Body Fat/Muscle Mass: no wasting noted / well nourished     2. Fluid Accumulation:  fluid overloaded on HD       NUTRITION PRESCRIPTION: 86.3 kg (IBW)  Calories: 9830-5388 calories/day (25-30 kcal/kg)  Protein: 104-112 grams protein/day ( 1.2-1.3  grams protein per kg)  Fluid: ~1 ml/kcal or per MD discretion    NUTRITION DIAGNOSIS/PROBLEM:  Inadequate energy intake related to insufficient appetite resulting in inadequate nutrition intake as evidenced by documented/reported insufficient oral intake and documented/reported unintentional weight loss      MONITOR AND EVALUATE/NUTRITION GOALS:  PO intake of 75% of meals TID - New  PO intake of 75% of oral nutrition supplement/s - New  Achieve and maintain dry wt +/- 1 to 2 lbs - New      MEDICATIONS:  Flomax, Zofran    LABS  , Creatinine 10.05, GFR 6    Pt is at Moderate nutrition risk    [facility] CastroLanc[facility] Community Hospital  Dietetic Intern  [id number]

## 2024-09-16 NOTE — ED PROVIDER NOTES
Patient Seen in: [facility] In [place]      History     Chief Complaint   Patient presents with    Difficulty Breathing     Stated Complaint: perla, abd pain    Subjective:   HPI    Patient a 44-year-old male has a history of renal failure and gets dialysis Monday Wednesday Friday.  Patient states he was diagnosed with prostatitis last week started Levaquin.  Patient also states he has had chronic GI bleeding has seen GI had a scope was supposed to have a capsule study which he missed on Tuesday.  Patient states he did have some vomiting diarrhea and missed his dialysis on Friday.  Patient states yesterday started getting short of breath.  Patient states he had significant abdominal pain as well.  Patient states he feels like he is fluid overloaded.  Patient denies chest pain, no pain with deep breaths.  Patient remainder of review of systems negative.    Objective:   Past Medical History:    Asthma ([facility])    Attention deficit hyperactivity disorder (ADHD)    Back problem    Bipolar 1 disorder ([facility])    CKD (chronic kidney disease) stage 3, GFR 30-59 ml/min ([facility])    Dr Meeks    Congestive heart disease ([facility])    COPD (chronic obstructive pulmonary disease) ([facility])    Coronary atherosclerosis    Deep vein thrombosis ([facility])    at age 17 R/T cast    Depression    Diabetes ([facility])    Essential hypertension    3/21 echo: severe concentric LVH with normal EF and no MR or pHTN    Extrinsic asthma, unspecified    Heart attack ([facility])    2016- angiogram- no intervention    Heart valve disease    mitral valve repair in 1994/    High blood pressure    High cholesterol    History of blood transfusion    History of mitral valve repair    Hyperlipidemia    Low back pain    tight and stiff after sweeping and mopping    LVH (left ventricular hypertrophy)    Migraines    Mixed hyperlipidemia     HDL 38 LDL 97 VLDL 57     Monoclonal gammopathy    IgG kappa     MVP (mitral valve prolapse)    Repair 1994 at  [facility]; echoes as recently as 3/21 show mild or trivial MR and no stenosis    Neuropathy    Osteoarthritis    hip ,knees    Pneumonia due to organism    Pulmonary embolism (HCC)    Renal disorder    Stroke (HCC)    TIA (transient ischemic attack)    Initial history of left-sided weakness and slurred speech. (+) cocaine. MRI of the brain, CT angiogram of the head and neck, and 2D echo are all unremarkable.     TMJ (dislocation of temporomandibular joint)    Troponin level elevated    Trop 60 60 47 with TIA and no CP: Lexiscan negative with EF 51              Past Surgical History:   Procedure Laterality Date    Av fistula revision, open Left     Colonoscopy N/A 2023    Procedure: COLONOSCOPY;  Surgeon: Heath Vu MD;  Location:  ENDOSCOPY    Colonoscopy N/A 2023    Procedure: COLONOSCOPY with cold snare polypectomy and forcep polypectomy;  Surgeon: Ousmane Suarez MD;  Location:  ENDOSCOPY    Colonoscopy & polypectomy      Egd  2019    Duodenitis. Biopsied. EUS for weight loss was negative    Heart surgery      Hernia surgery  2022    Dr Barnes    Laminectomy,>2 sgmt,lumbar  2018    L4-L5 Decomp Discectomy ROEM L4-L5    Mitralplasty w cp bypass      [facility]: Repair    Repair rotator cuff,chronic Left     torn and had a ruptured bicep    Valve repair      mitral valve                Social History     Socioeconomic History    Marital status:    Tobacco Use    Smoking status: Former     Current packs/day: 0.00     Average packs/day: 1 pack/day for 27.0 years (27.0 ttl pk-yrs)     Types: Cigarettes     Start date: 1995     Quit date: 2022     Years since quittin.5     Passive exposure: Never    Smokeless tobacco: Never   Vaping Use    Vaping status: Never Used   Substance and Sexual Activity    Alcohol use: No    Drug use: No     Social Determinants of Health     Financial Resource Strain: Medium Risk (2024)    Received from [facility]     Overall Financial Resource Strain (CARDIA)     Difficulty of Paying Living Expenses: Somewhat hard   Food Insecurity: No Food Insecurity (8/31/2024)    Food Insecurity     Food Insecurity: Never true   Transportation Needs: No Transportation Needs (8/31/2024)    Transportation Needs     Lack of Transportation: No   Physical Activity: Inactive (5/7/2024)    Received from [facility]    Exercise Vital Sign     Days of Exercise per Week: 0 days     Minutes of Exercise per Session: 0 min   Stress: Stress Concern Present (5/7/2024)    Received from [facility]    [place] [employer] - Occupational Stress Questionnaire     Feeling of Stress : Rather much   Social Connections: Unknown (5/7/2024)    Received from [facility]    Social Connection and Isolation Panel [NHANES]     Frequency of Communication with Friends and Family: More than three times a week     Frequency of Social Gatherings with Friends and Family: More than three times a week     Attends [employer] Services: Never     Active Member of Clubs or Organizations: No     Attends Club or Organization Meetings: Never     Marital Status: Patient unable to answer   Housing Stability: Low Risk  (8/31/2024)    Housing Stability     Housing Instability: No          Former smoker 30 years quit 2 years ago.  No alcohol or drugs    Review of Systems    Positive for stated Chief Complaint: Difficulty Breathing    Other systems are as noted in HPI.  Constitutional and vital signs reviewed.      All other systems reviewed and negative except as noted above.    Physical Exam     ED Triage Vitals   BP 09/15/24 1905 (!) 193/130   Pulse 09/15/24 1905 91   Resp 09/15/24 1905 (!) 28   Temp 09/15/24 1905 98.8 °F (37.1 °C)   Temp src 09/15/24 2224 Temporal   SpO2 09/15/24 1905 98 %   O2 Device 09/15/24 1905 None (Room air)       Current Vitals:   Vital Signs  BP: (!) 192/142  Pulse: 92  Resp: 20  Temp: 98.2 °F (36.8 °C)  Temp src:  Temporal    Oxygen Therapy  SpO2: 95 %  O2 Device: None (Room air)            Physical Exam  GENERAL: Patient resting  no calf tenderness or edema on the cart in no acute distress.  HEENT: Extraocular muscles intact, pupils equal round reactive to light and accommodation.  Mouth normal, neck supple, no meningismus.  LUNGS: Lungs clear to auscultation bilaterally.  CARDIOVASCULAR: + S1-S2, regular rate and rhythm, no murmurs.  BACK: No CVA tenderness, no midline bony tenderness.  ABDOMEN: + Bowel sounds, soft, moderate tenderness periumbilical, nondistended.  No rebound, no guarding, no hepatosplenomegaly.'  Rectal exam Hemoccult negative brown stool  EXTREMITIES: Full range of motion, no tenderness, good capillary refill.  No calf tenderness or edema  SKIN: No rash, good turgor.  NEURO: Patient answers questions appropriately.  No focal deficits appreciated.  Conversant           ED Course     Labs Reviewed   COMP METABOLIC PANEL (14) - Abnormal; Notable for the following components:       Result Value    Potassium 5.6 (*)     CO2 19.0 (*)      (*)     Creatinine 11.40 (*)     Calculated Osmolality 321 (*)     eGFR-Cr 5 (*)     AST 55 (*)     All other components within normal limits   CBC WITH DIFFERENTIAL WITH PLATELET - Abnormal; Notable for the following components:    RBC 2.88 (*)     HGB 9.2 (*)     HCT 27.5 (*)     All other components within normal limits   LIPASE - Abnormal; Notable for the following components:    Lipase 135 (*)     All other components within normal limits   TROPONIN I HIGH SENSITIVITY - Abnormal; Notable for the following components:    Troponin I (High Sensitivity) 552 (*)     All other components within normal limits   PRO BETA NATRIURETIC PEPTIDE - Abnormal; Notable for the following components:    Pro-Beta Natriuretic Peptide 28,618 (*)     All other components within normal limits   PROTHROMBIN TIME (PT) - Normal   PTT, ACTIVATED - Normal   RAPID SARS-COV-2 BY PCR - Normal    URINALYSIS WITH CULTURE REFLEX   LIPID PANEL     EKG    Rate, intervals and axes as noted on EKG Report.  Rate: 90  Rhythm: Sinus Rhythm  Reading: Normal sinus rhythm, nonspecific ST changes                 Chest x-ray1. There is interval removal of right tunneled dialysis catheter.   2. There is improvement in opacity right lung base consistent with approved atelectasis or pneumonia.    Independent reviewed by myself, no pneumothorax    CT abdomen pel1. A specific etiology for abdominal pain is not evident from this study.   2. There is diverticulosis of colon without CT evidence of diverticulitis.   3. Small right pleural effusion has decreased since prior study.             MDM      Patient was given pain medicines for his abdominal pain.  Patient was given Lasix as he states he still makes urine.  Patient was given clonidine for his elevated blood pressure.  Patient hemoglobin but stable.  With patient's pain fluid overload need for dialysis will be admitted for further evaluation.  I did speak with hospitalist.  I did speak with nephrology who recommended additional Lasix continue clonidine for blood pressure and patient will be dialyzed in the hospital.  I did consider renal failure, CHF, diverticulitis GI bleed, anemia  Admission disposition: 9/15/2024 10:18 PM                                        Medical Decision Making      Disposition and Plan     Clinical Impression:  1. Stage 5 chronic kidney disease on chronic dialysis (HCC)    2. Abdominal pain, acute    3. Anemia, unspecified type    4. Acute on chronic congestive heart failure, unspecified heart failure type (HCC)    5. Elevated troponin    6. Hyperkalemia         Disposition:  Admit  9/15/2024 10:18 pm    Follow-up:  No follow-up provider specified.        Medications Prescribed:  Current Discharge Medication List                            Hospital Problems       Present on Admission  Date Reviewed: 8/8/2024            ICD-10-CM Noted POA    *  (Principal) Stage 5 chronic kidney disease on chronic dialysis (HCC) N18.6, Z99.2 9/15/2024 Unknown

## 2024-09-16 NOTE — H&P
[facility] Hospitalist History and Physical      Chief Complaint   Patient presents with    Difficulty Breathing        PCP: Prieto Novak MD      History of Present Illness: Patient is a 44 year old male with PMH sig for ESRD on HD (M/W/F), bipolar, depression, DM 2, HTN, DL, CAD, COPD, HFpEF, and chronic abdominal pain who presented to the ED for evaluation of shortness of breath.  He states he was diagnosed with prostatitis last week, was started on levaquin.  He also has chronic abd pain and GI bleeding, follows with GI, was to have an outpt capsule study which he missed on Tuesday.  Pt reports vomiting and diarrhea on Friday so he missed his HD session.  Yesterday he started to feel SOB, feels fluid overloaded.   No F/C, or bright red blood per rectum.  Denies sick contacts.      In the ED, BUN/Cr 104/11.40, K 5.6 .  Trop 552.  Hgb stable at 9.3.  CT a/p neg for acute pathology.      On my evaluation, pt denies feeling SOB at rest, c/o lower abd pain which he thinks he related to his prostatitis.      Past Medical History:    Asthma (Formerly [facility])    Attention deficit hyperactivity disorder (ADHD)    Back problem    Bipolar 1 disorder (HCC)    CKD (chronic kidney disease) stage 3, GFR 30-59 ml/min (Formerly [facility])    Dr Meeks    Congestive heart disease (Formerly [facility])    COPD (chronic obstructive pulmonary disease) (Formerly [facility])    Coronary atherosclerosis    Deep vein thrombosis (Formerly [facility])    at age 17 R/T cast    Depression    Diabetes (Formerly [facility])    Essential hypertension    3/21 echo: severe concentric LVH with normal EF and no MR or pHTN    Extrinsic asthma, unspecified    Heart attack (Formerly [facility])    2016- angiogram- no intervention    Heart valve disease    mitral valve repair in 1994/    High blood pressure    High cholesterol    History of blood transfusion    History of mitral valve repair    Hyperlipidemia    Low back pain    tight and stiff after sweeping and mopping    LVH (left ventricular hypertrophy)    Migraines    Mixed  hyperlipidemia     HDL 38 LDL 97 VLDL 57     Monoclonal gammopathy    IgG kappa     MVP (mitral valve prolapse)    Repair  at [place]; echoes as recently as 3/21 show mild or trivial MR and no stenosis    Neuropathy    Osteoarthritis    hip ,knees    Pneumonia due to organism    Pulmonary embolism (HCC)    Renal disorder    Stroke (HCC)    TIA (transient ischemic attack)    Initial history of left-sided weakness and slurred speech. (+) cocaine. MRI of the brain, CT angiogram of the head and neck, and 2D echo are all unremarkable.     TMJ (dislocation of temporomandibular joint)    Troponin level elevated    Trop 60 60 47 with TIA and no CP: Lexiscan negative with EF 51      Past Surgical History:   Procedure Laterality Date    Av fistula revision, open Left     Colonoscopy N/A 2023    Procedure: COLONOSCOPY;  Surgeon: Heath Vu MD;  Location:  ENDOSCOPY    Colonoscopy N/A 2023    Procedure: COLONOSCOPY with cold snare polypectomy and forcep polypectomy;  Surgeon: Ousmane Suarez MD;  Location:  ENDOSCOPY    Colonoscopy & polypectomy      [facility]  2019    Duodenitis. Biopsied. EUS for weight loss was negative    Heart surgery      Hernia surgery  2022    Dr Barnes    Laminectomy,>2 sgmt,lumbar  2018    L4-L5 Decomp Discectomy ROEM L4-L5    Mitralplasty w cp bypass      [place]: Repair    Repair rotator cuff,chronic Left     torn and had a ruptured bicep    Valve repair      mitral valve        ALL:  Allergies   Allergen Reactions    Hydrochlorothiazide RASH and HIVES      Current Facility-Administered Medications on File Prior to Encounter   Medication Dose Route Frequency Provider Last Rate Last Admin    [COMPLETED] epoetin sylvia (Epogen, Procrit) 86950 UNIT/ML injection 10,000 Units  10,000 Units Intravenous Once Lenka Bond MD   10,000 Units at 24 1219    [] sodium chloride 0.9 % IV bolus 100 mL  100 mL Intravenous Q30 Min PRN Lenka Bond MD         And    [] albumin human (Albumin) 25% injection 25 g  25 g Intravenous PRN Dialysis Lenka Bond MD        [COMPLETED] HYDROmorphone (Dilaudid) 1 MG/ML injection 0.5 mg  0.5 mg Intravenous Once Lew, Meera, DO   0.5 mg at 24 0907    [COMPLETED] HYDROmorphone (Dilaudid) 1 MG/ML injection 1 mg  1 mg Intravenous Once Lew, Meera, DO   1 mg at 24 0938    [COMPLETED] HYDROmorphone (Dilaudid) 1 MG/ML injection 1 mg  1 mg Intravenous Once Lew Meera, DO   1 mg at 24 1109    [COMPLETED] furosemide (Lasix) 10 mg/mL injection 40 mg  40 mg Intravenous Once PeksaKerry Herrera DO   40 mg at 24 2325    [COMPLETED] HYDROmorphone (Dilaudid) 1 MG/ML injection 0.5 mg  0.5 mg Intravenous Once Neala-Kerry Bryant, DO   0.5 mg at 24 0014    [COMPLETED] predniSONE (Deltasone) tab 40 mg  40 mg Oral Once Del Cordova MD   40 mg at 24 1858    [COMPLETED] acetaminophen (Tylenol Extra Strength) tab 1,000 mg  1,000 mg Oral Once Camille Jones MD   1,000 mg at 24 1713    [COMPLETED] ampicillin-sulbactam (Unasyn) 3 g in sodium chloride 0.9% 100mL IVPB-ADD  3 g Intravenous Once Camille Jones MD   Stopped at 24 1743    [COMPLETED] ketorolac (Toradol) 15 MG/ML injection 15 mg  15 mg Intravenous Once Mary Lou Feldman DO   15 mg at 24 0555    [COMPLETED] ondansetron (Zofran) 4 MG/2ML injection 4 mg  4 mg Intravenous Once Mary Lou Feldman DO   4 mg at 24 0555    [COMPLETED] metoclopramide (Reglan) 5 mg/mL injection 10 mg  10 mg Intravenous Once Del Cordova MD   10 mg at 24 0618    [COMPLETED] diphenhydrAMINE (Benadryl) 50 mg/mL  injection 50 mg  50 mg Intravenous Once Del Cordova MD   50 mg at 24 0618    [COMPLETED] iopamidol 76% (ISOVUE-370) injection for power injector  100 mL Intravenous ONCE PRN Del Cordova MD   100 mL at 24 0725    [COMPLETED] albuterol (Ventolin HFA) 108 (90 Base) MCG/ACT inhaler 8 puff  8 puff  Inhalation Once Del Cordova MD   8 puff at 06/25/24 0835    [COMPLETED] ondansetron (Zofran) 4 MG/2ML injection 4 mg  4 mg Intravenous Once Sharron Leon MD   4 mg at 06/23/24 0858    [COMPLETED] HYDROmorphone (Dilaudid) 1 MG/ML injection 0.5 mg  0.5 mg Intravenous Once Sharron Leon MD   0.5 mg at 06/23/24 0858    [COMPLETED] HYDROmorphone (Dilaudid) 1 MG/ML injection 0.5 mg  0.5 mg Intravenous Once Sharron Leon MD   0.5 mg at 06/23/24 0950    [COMPLETED] HYDROmorphone (Dilaudid) 1 MG/ML injection 0.5 mg  0.5 mg Intravenous Once Sharron Leon MD   0.5 mg at 06/23/24 1104     Current Outpatient Medications on File Prior to Encounter   Medication Sig Dispense Refill    levoFLOXacin 250 MG Oral Tab TAKE 2 TABLETs BY MOUTH one time then take 1 tablet every 48 hours for 30 days      albuterol 108 (90 Base) MCG/ACT Inhalation Aero Soln Inhale 2 puffs into the lungs every 6 (six) hours as needed for Wheezing.      tiotropium 18 MCG Inhalation Cap Inhale 1 capsule (18 mcg total) into the lungs daily.      tamsulosin 0.4 MG Oral Cap Take 1 capsule (0.4 mg total) by mouth daily.      cloNIDine 0.1 MG Oral Tab Take 1 tablet (0.1 mg total) by mouth daily with food.      ARIPiprazole 5 MG Oral Tab Take 3 tablets (15 mg total) by mouth daily.      dicyclomine 10 MG Oral Cap Take 1 capsule (10 mg total) by mouth 3 (three) times daily as needed. 20 capsule 0    isosorbide mononitrate ER 30 MG Oral Tablet 24 Hr Take 1 tablet (30 mg total) by mouth daily. Hold if systolic blood pressure <130      Lisdexamfetamine Dimesylate 60 MG Oral Cap Take 70 mg by mouth every morning.      buPROPion  MG Oral Tablet 24 Hr Take 1 tablet (150 mg total) by mouth daily.      lamoTRIgine (LAMICTAL) 150 MG Oral Tab Take 1 tablet (150 mg total) by mouth daily. 7 tablet 0    FLUoxetine HCl 40 MG Oral Cap Take 1 capsule (40 mg total) by mouth daily. 7 capsule 0    RENVELA 800 MG Oral Tab Take 1 tablet (800 mg total) by mouth 3  (three) times daily with meals.      losartan 100 MG Oral Tab Take 1 tablet (100 mg total) by mouth daily. Hold if systolic blood pressure <130      hydrALAZINE 50 MG Oral Tab Take 1 tablet (50 mg total) by mouth in the morning and 1 tablet (50 mg total) before bedtime. Hold if systolic blood pressure <130. 30 tablet 0    NIFEdipine ER 60 MG Oral Tablet 24 Hr Take 1 tablet (60 mg total) by mouth in the morning and 1 tablet (60 mg total) before bedtime. Hold if systolic blood pressure <130.      carvedilol 25 MG Oral Tab Take 1 tablet (25 mg total) by mouth in the morning and 1 tablet (25 mg total) in the evening. Take with meals. 60 tablet 6    Fenofibrate 134 MG Oral Cap Take 1 capsule by mouth nightly. 90 capsule 1    Fluticasone Propionate 50 MCG/ACT Nasal Suspension SPRAY ONCE INTO EACH NOSTRIL BID PRN 15.8 mL 0    hydrocortisone 25 MG Rectal Suppos Place 1 suppository (25 mg total) rectally 2 (two) times daily. 60 suppository 0         Social History     Tobacco Use    Smoking status: Former     Current packs/day: 0.00     Average packs/day: 1 pack/day for 27.0 years (27.0 ttl pk-yrs)     Types: Cigarettes     Start date: 1995     Quit date: 2022     Years since quittin.5     Passive exposure: Never    Smokeless tobacco: Never   Substance Use Topics    Alcohol use: No        Fam Hx  Family History   Problem Relation Age of Onset    Hypertension Father     Alcohol and Other Disorders Associated Father     Substance Abuse Father         cocaine    Dementia Father     Cancer Father         lung    Diabetes Mother     Cancer Mother         multiple myeloma    Hypertension Mother     Anxiety Maternal Aunt     Depression Maternal Aunt     Anxiety Maternal Aunt     Depression Maternal Aunt     Bipolar Disorder Maternal Aunt     Diabetes Maternal Grandmother     Hypertension Maternal Grandmother     Cancer Maternal Grandfather         stomach cancer    Diabetes Maternal Grandfather     Hypertension  Maternal Grandfather     Alcohol and Other Disorders Associated Maternal Grandfather     Hypertension Paternal Grandmother     Hypertension Paternal Grandfather     Cancer Sister         uterine and ovarian    Hypertension Sister     Cancer Maternal Uncle         lung    Cancer Paternal Aunt         throat       Review of Systems  Comprehensive ROS reviewed and negative except for what is stated in HPI.      OBJECTIVE:  BP (!) 168/110 (BP Location: Right arm)   Pulse 115   Temp 98.4 °F (36.9 °C) (Oral)   Resp (!) 35   Ht 6' 2\" (1.88 m)   Wt 245 lb (111.1 kg)   SpO2 97%   BMI 31.46 kg/m²   Gen: No acute distress, alert and oriented x3, no focal neurologic deficits  HEENT:  EOMI, PERRLA, OP clear, MMM  Pulm:Diminished at the bases , normal respiratory effort  CV: Tachy, reg, no murmur.  Normal PMI.    Abd: Abdomen soft, nontender, nondistended, no organomegaly, bowel sounds present  MSK: Full range of motion in extremities, no clubbing, no cyanosis  Skin: no rashes or lesions  Neuro:  Grossly intact, no focal deficits      Data Review:    LABS:   Lab Results   Component Value Date    WBC 7.7 09/16/2024    HGB 9.3 09/16/2024    HCT 27.9 09/16/2024    .0 09/16/2024    CREATSERUM 10.05 09/16/2024     09/16/2024     09/16/2024    K 5.0 09/16/2024     09/16/2024    CO2 18.0 09/16/2024     09/16/2024    CA 8.9 09/16/2024    ALB 3.5 09/15/2024    ALKPHO 66 09/15/2024    BILT 0.6 09/15/2024    TP 6.8 09/15/2024    AST 55 09/15/2024    ALT 25 09/15/2024    PTT 27.8 09/15/2024    INR 1.11 09/15/2024    PTP 14.3 09/15/2024     09/15/2024       CXR: image personally reviewed.      Radiology: CT ABDOMEN+PELVIS(CPT=74176)    Result Date: 9/15/2024  PROCEDURE:  CT ABDOMEN+PELVIS (CPT=74176)  COMPARISON:  [facility], CT, CT ABDOMEN+PELVIS(CPT=74176), 8/30/2024, 9:57 AM.  INDICATIONS:  perla, abd pain  TECHNIQUE:  Unenhanced multislice CT scanning was performed from the dome of the  diaphragm to the pubic symphysis.  Dose reduction techniques were used. Dose information is transmitted to the ACR (American College of Radiology) NRDR (National Radiology Data Registry) which includes the Dose Index Registry.  PATIENT STATED HISTORY: (As transcribed by Technologist)     FINDINGS:  LIVER:  No enlargement, atrophy, abnormal density, or significant focal lesion.  BILIARY:  No visible dilatation or calcification.  PANCREAS:  No lesion, fluid collection, ductal dilatation, or atrophy.  SPLEEN:  No enlargement or focal lesion.  KIDNEYS:  No mass, obstruction, or calcification.  ADRENALS:  No mass or enlargement.  AORTA/VASCULAR:    Unremarkable as seen on non-contrast imaging. RETROPERITONEUM:  No mass or adenopathy.  BOWEL/MESENTERY:  There is diffuse diverticulosis of the colon.  There is no CT evidence of diverticulitis. ABDOMINAL WALL:  No mass or hernia.  URINARY BLADDER:  No visible focal wall thickening, lesion, or calculus.  PELVIC NODES:  No adenopathy.  PELVIC ORGANS:  No visible mass.  Pelvic organs appropriate for patient age.  BONES:  There is advanced degenerative disc disease in the lumbar spine. LUNG BASES:  Dependent atelectasis right lower lobe is noted.  There is a small right pleural effusion. OTHER:  Negative.             CONCLUSION:  1. A specific etiology for abdominal pain is not evident from this study. 2. There is diverticulosis of colon without CT evidence of diverticulitis. 3. Small right pleural effusion has decreased since prior study.    LOCATION:  [facility]   Dictated by (CST): Adrian Blevins MD on 9/15/2024 at 10:12 PM     Finalized by (CST): Adrian Blevins MD on 9/15/2024 at 10:15 PM       XR CHEST AP PORTABLE  (CPT=71045)    Result Date: 9/15/2024  PROCEDURE:  XR CHEST AP PORTABLE  (CPT=71045)  TECHNIQUE:  AP chest radiograph was obtained.  COMPARISON:  [facility] , XR, XR CHEST PA + LAT CHEST (KPM=83919), 9/02/2024, 7:58 AM.  [facility] , XR, XR CHEST AP PORTABLE  (CPT=71045),  8/30/2024, 11:23 PM.  INDICATIONS:  perla, abd pain  PATIENT STATED HISTORY: (As transcribed by Technologist)  Patient states he has difficulty in breathing and productive cough for 2 days. History of asthma and pneumonia.    FINDINGS:  There is interval improvement in consolidation right lower lobe consistent with improved right lower lobe pneumonia or atelectasis.  The remainder of the lungs is clear.  Heart size is within normal limits.  Mediastinum and elenita are unremarkable.  Right internal jugular tunneled dialysis catheter has been removed.            CONCLUSION:  1. There is interval removal of right tunneled dialysis catheter. 2. There is improvement in opacity right lung base consistent with approved atelectasis or pneumonia.    LOCATION:  [facility]      Dictated by (CST): Adrian Blevins MD on 9/15/2024 at 8:23 PM     Finalized by (CST): Adrian Blevins MD on 9/15/2024 at 8:24 PM       XR CHEST PA + LAT CHEST (CPT=71046)    Result Date: 9/2/2024  PROCEDURE:  XR CHEST PA + LAT CHEST (CPT=71046)  INDICATIONS:  hypoxia  COMPARISON:  [facility], CT, CT ABDOMEN+PELVIS(CPT=74176), 8/30/2024, 9:57 AM.  [facility] , XR, XR CHEST AP PORTABLE  (CPT=71045), 8/30/2024, 11:23 PM.  [facility], XR, XR CHEST AP PORTABLE  (CPT=71045), 8/30/2024, 8:57 AM.  TECHNIQUE:  PA and lateral chest radiographs were obtained.  PATIENT STATED HISTORY: (As transcribed by Technologist)  Patient offered no additional history at this time.               CONCLUSION:  The patient is status post mitral valve replacement.  The heart size is within normal limits without CHF.  A right central venous catheter/Rito catheter is noted with tip in the distal SVC. There is decrease in the right lower lobe and right perihilar consolidation with slight decrease in the right effusion.  Stable slight thickening of the horizontal fissure.  The left lung is clear.  No pneumothorax.  Bolus changes are noted in the right lung base laterally/right middle lobe.    LOCATION:  [facility]   Dictated by (CST): Albert Faith MD on 9/02/2024 at 8:09 AM     Finalized by (CST): Albert Faith MD on 9/02/2024 at 8:11 AM       XR CHEST AP PORTABLE  (CPT=71045)    Result Date: 8/30/2024  PROCEDURE:  XR CHEST AP PORTABLE  (CPT=71045)  TECHNIQUE:  AP chest radiograph was obtained.  COMPARISON:  [facility], XR, XR CHEST AP PORTABLE  (CPT=71045), 8/30/2024, 8:57 AM.  INDICATIONS:  rectal bleeding and chest pain  PATIENT STATED HISTORY: (As transcribed by Technologist)  rectal bleeding and chest pain.    FINDINGS:  Tunneled right internal jugular hemodialysis catheter remains in place.  Stable cardiomegaly with valve prosthesis.  Mild pulmonary vascular congestion and perihilar interstitial edema.  Increased right pleural effusion and right basilar consolidation/atelectasis.            CONCLUSION:  1. Increased right pleural effusion and right basilar atelectasis/consolidation. 2. Stable vascular congestion interstitial edema suggestive of CHF or fluid overload.  LOCATION:  [facility]      Dictated by (CST): Ricki Zaragoza MD on 8/30/2024 at 11:43 PM     Finalized by (CST): Ricki Zaragoza MD on 8/30/2024 at 11:44 PM       CT ABDOMEN+PELVIS(CPT=74176)    Result Date: 8/30/2024  PROCEDURE:  CT ABDOMEN+PELVIS (CPT=74176)  COMPARISON:  [facility], CT, CT ABDOMEN+PELVIS(CONTRAST ONLY)(CPT=74177), 6/25/2024, 7:21 AM.  INDICATIONS:  rectal bleeding, SOB  TECHNIQUE:  Unenhanced multislice CT scanning was performed from the dome of the diaphragm to the pubic symphysis.  Dose reduction techniques were used. Dose information is transmitted to the ACR (American College of Radiology) NRDR (National Radiology Data Registry) which includes the Dose Index Registry.  PATIENT STATED HISTORY: (As transcribed by Technologist)  Patient has rectal bleeding and shortness of breath.    FINDINGS:  LIVER:  No enlargement, atrophy, abnormal density, or significant focal lesion.  BILIARY:  No visible dilatation or  calcification.  PANCREAS:  No lesion, fluid collection, ductal dilatation, or atrophy.  SPLEEN:  No enlargement or focal lesion.  KIDNEYS:  No mass, obstruction, or calcification.  ADRENALS:  No mass or enlargement.  AORTA/VASCULAR:    Unremarkable as seen on non-contrast imaging. RETROPERITONEUM:  No mass or adenopathy.  BOWEL/MESENTERY:  No visible mass, obstruction, or bowel wall thickening.  Appendix is normal. ABDOMINAL WALL:  Fat containing bilateral inguinal hernias are noted.  URINARY BLADDER:  No visible focal wall thickening, lesion, or calculus.  PELVIC NODES:  No adenopathy.  PELVIC ORGANS:  No visible mass.  Pelvic organs appropriate for patient age.  BONES:  No bony lesion or fracture.  LUNG BASES:  Small right effusion has increased in size compared to the prior exam.  Loculated effusion along the anterior aspect of the right hemithorax appears progressed from the prior exam.  Right basilar atelectatic changes noted.  Mildly prominent cardiophrenic lymph nodes are noted which are nonspecific. OTHER:  Negative.             CONCLUSION:  1. Small right pleural effusion is increased in size compared to the prior examination.  The right effusion appears partially loculated.  Right basilar atelectatic changes present.   LOCATION:  [facility]   Dictated by (CST): Duy Fitzpatrick MD on 8/30/2024 at 10:55 AM     Finalized by (CST): Duy Fitzpatrick MD on 8/30/2024 at 11:01 AM       XR CHEST AP PORTABLE  (CPT=71045)    Result Date: 8/30/2024  PROCEDURE:  XR CHEST AP PORTABLE  (CPT=71045)  TECHNIQUE:  AP chest radiograph was obtained.  COMPARISON:  [facility], XR, XR CHEST PA + LAT CHEST (CPT=71046), 8/11/2024, 5:57 PM.  [facility], XR, XR CHEST AP PORTABLE  (CPT=71045), 8/08/2024, 5:00 PM.  INDICATIONS:  rectal bleeding, SOB  PATIENT STATED HISTORY: (As transcribed by Technologist)  Patient had onset of right sided abdominal pain with bloating/swelling to the abdomen, on 9/29/24.  He also complains of shortness of  breath.  He denies any nausea, vomiting or specific chest pain.  Patient has a history of mitrovalve surgery in 2022.    FINDINGS:  Stable right-sided dialysis catheter.  Cardiomegaly.  Cardiac valve prosthesis.  Interstitial opacities bilaterally.  Small right-sided pleural effusion right basilar atelectasis/infiltrates.  No pneumothorax.            CONCLUSION:  1. Cardiomegaly with interstitial opacities likely representing edema. 2. Small right-sided pleural effusion with right basilar atelectasis/infiltrates.    LOCATION:  [facility]      Dictated by (CST): Quinn Bernal MD on 8/30/2024 at 9:16 AM     Finalized by (CST): Quinn Bernal MD on 8/30/2024 at 9:22 AM          Assessment/Plan:     44 yr old male with PMH sig for ESRD on HD (M/W/F), bipolar, depression, DM 2, HTN, DL, CAD, COPD, HFpEF, and chronic abdominal pain who presented to the ED for evaluation of shortness of breath.     # Stage 5 CKD with fluid overload   # Hyperkalemia   - suspect due to missed HD  - renal c/s  - HD per renal  - monitor K    # Acute on chronic diastolic HF  # Elevated troponin   # CAD  - suspect demand ischemia due to missed HD  - trop down trending   - last TTE 3/10/24 with intact LVEF  - cards c/s    # Anemia of chronic disease   # Hx of GI bleeding  - pt following with GI as outpt   - plan outpt capsule study   - monitor hgb    # Chronic abd pain  # Opioid dependence   - follows with pain clinic as outpt   - resume home pain medications   - minimize IV narcotics     # Essential HTN  - BP elevated on admit  - resume home BP meds    # HLD  - cont statin    # COPD  - no evidence of acute exacerbation   - resume home inhalers     # Major depression, recurrent   # Bipolar d/o  - stable  - resume home medications     DVT prophy - hep subcutaneous   Dispo: intp care.  POC d/w pt who agrees.     Outpatient records or previous hospital records reviewed.   [facility] hospitalist to continue to follow patient while in house  A total of 76  minutes taken with patient and coordinating care.  Greater than 50% face to face encounter.      Phong Freedman DO  [facility] ERist

## 2024-09-16 NOTE — PHYSICAL THERAPY NOTE
PHYSICAL THERAPY EVALUATION - INPATIENT     Room Number: 7609/7609-A  Evaluation Date: 9/16/2024  Type of Evaluation: Initial  Physician Order: PT Eval and Treat    Presenting Problem: SOB, rectal bleeding  Co-Morbidities : Chronic GIB, pt reports C5-6 compression fx, CKD, DM2, HTN, CAD, COPD  Reason for Therapy: Mobility Dysfunction and Discharge Planning    PHYSICAL THERAPY ASSESSMENT   Patient is a 44 year old male admitted 9/15/2024 for SOB, rectal bleeding.   Patient is currently functioning at baseline with bed mobility, transfers, and gait. Prior to admission, patient's baseline is independent.     Given the patient is functioning near baseline level do not anticipate skilled therapy needs at discharge .    PLAN  Patient has been evaluated and presents with no skilled Physical Therapy needs at this time.  Patient discharged from Physical Therapy services.  Please re-order if a new functional limitation presents during this admission.    GOALS  Patient was able to achieve the following goals ...    Patient was able to transfer At previous, functional level   Patient able to ambulate on level surfaces At previous, functional level         HOME SITUATION  Type of Home: House   Home Layout: Two level  Stairs to Enter : 2     Stairs to Bedroom: 14  Railing: Yes    Lives With: Parent(s)  Drives: Yes  Patient Owned Equipment: None  Patient Regularly Uses: Reading glasses;Hearing aides    Prior Level of [place]: Pt reports that he is typically indep with ADLs and mobility. Reports living with his mom who had a previous stroke with R sided weakness so he assists her with laundry and stairs. Pt reports a previous TIA with residual R sided weakness as well Leg>arm, reports limited with long distance ambulation and stairs, takes a break. Reports most recent fall was a few months ago when he was at [facility] when he got light headed in the bathroom. Pt reports a compression fx C5-6 which pt reports contributes to HA  and LUE pain, but pt reports no restrictions or brace, was supposed to have surgery on the 6th but has been canceled.     SUBJECTIVE  \"The lights make it worse\"      OBJECTIVE  Precautions: Spine  Fall Risk: Standard fall risk    WEIGHT BEARING RESTRICTION  Weight Bearing Restriction: None                PAIN ASSESSMENT  Ratin  Location: headache  Management Techniques: Relaxation    COGNITION  Overall Cognitive Status:  WFL - within functional limits    RANGE OF MOTION AND STRENGTH ASSESSMENT  Upper extremity ROM and strength are within functional limits     Lower extremity ROM is within functional limits     Lower extremity strength is within functional limits except for the following:    LLE 5/5  RLE quad and HS 4/5     BALANCE  Static Sitting: Good  Dynamic Sitting: Good  Static Standing: Fair +  Dynamic Standing: Fair    ADDITIONAL TESTS                                    ACTIVITY TOLERANCE  Pulse: 107  Heart Rate Source: Monitor                   O2 WALK  Oxygen Therapy  SPO2% on Room Air at Rest: 97  SPO2% Ambulation on Oxygen: 94    NEUROLOGICAL FINDINGS  Neurological Findings: Coordination - Heel to Shin;Coordination - Finger to Nose;Coordination - Rapid Alternating Movement  Coordination - Finger to Nose: Symmetrical  Coordination - Heel to Shin: Symmetrical  Coordination - Rapid Alternating Movement: Symmetrical            AM-PAC '6-Clicks' INPATIENT SHORT FORM - BASIC MOBILITY  How much difficulty does the patient currently have...  Patient Difficulty: Turning over in bed (including adjusting bedclothes, sheets and blankets)?: None   Patient Difficulty: Sitting down on and standing up from a chair with arms (e.g., wheelchair, bedside commode, etc.): None   Patient Difficulty: Moving from lying on back to sitting on the side of the bed?: None   How much help from another person does the patient currently need...   Help from Another: Moving to and from a bed to a chair (including a wheelchair)?: None    Help from Another: Need to walk in hospital room?: None   Help from Another: Climbing 3-5 steps with a railing?: A Little       AM-PAC Score:  Raw Score: 23   Approx Degree of Impairment: 11.2%   Standardized Score (AM-PAC Scale): 56.93   CMS Modifier (G-Code): CI    FUNCTIONAL ABILITY STATUS  Gait Assessment   Functional Mobility/Gait Assessment  Gait Assistance: Supervision;Independent  Distance (ft): 150  Assistive Device: None  Pattern: Within Functional Limits  Stairs:  (Pt demo sufficient strength and balance to ascend and descend flight of stairs with ind-supervision)    Skilled Therapy Provided     Bed Mobility:  Rolling: NT  Supine to sit: ind   Sit to supine: NT     Transfer Mobility:  Sit to stand: ind   Stand to sit: ind  Gait = supervision-ind    Therapist's comments:RN cleared for session. Pt agreeable for therapy, received supine. Pt with audible SOB throughout session, SpO2 WNL, rated 5/10 on JAMAAL. Pt with 9/10 HA, reports from cervical compression fx. Pt able to lay socks indep but with inc pain with forward flexion. Pt able to toilet indep. Pt reports L calf pain. Communicated with RN following session. Instructed to call for nursing staff for any needs and OOB mobility.       Exercise/Education Provided:  Bed mobility  Body mechanics  Energy conservation  Functional activity tolerated  Neuromuscular re-educate  Posture  Transfer training    Patient End of Session: Up in chair;Needs met;Call light within reach;RN aware of session/findings;All patient questions and concerns addressed;Alarm set;Discussed recommendations with /    Patient Evaluation Complexity Level:  History High - 3 or more personal factors and/or co-morbidities   Examination of body systems Low -  addressing 1-2 elements   Clinical Presentation  Moderate - Evolving   Clinical Decision Making Low Complexity       PT Session Time: 25 minutes  Gait Trainin minutes  Therapeutic Activity: 10 minutes

## 2024-09-16 NOTE — ED QUICK NOTES
Orders for admission, patient is aware of plan and ready to go upstairs. Any questions, please call ED RN Adrian at extension [phone].     Patient Covid vaccination status: Fully vaccinated     COVID Test Ordered in ED: Rapid SARS-CoV-2 by PCR    COVID Suspicion at Admission: N/A    Running Infusions:  None    Mental Status/LOC at time of transport: a&o*4/4    Other pertinent information: NA  CIWA score: N/A   NIH score:  N/A

## 2024-09-16 NOTE — ED INITIAL ASSESSMENT (HPI)
States he is having trouble breathing, has rectal bleeding, abd pain. States he missed one dialysis and feels fluid overloaded. States he was supposed to have a capsule study done Tuesday but was not feeling well enough.

## 2024-09-16 NOTE — CONSULTS
[facility] Cardiology  Consultation Note      Godwin Fonseca Patient Status:  Inpatient    1980 MRN [id number]   Location [facility] 7NE-A Attending Phong Freedman, DO   Hosp Day # 1 PCP Prieto Novak MD     Reason for consult: Troponin    Primary cardiologist: Devan Rankin MD     History of Present Illness:  Godwin Fonseca is a 44 year old male with ESRD on HD, H/o DVT/PE  s/p Eliquis x 6 mo, \"NSTEMI\"  with cath showing no sig CAD, h/o severe MR s/p MVR , redo complex robotically assisted MVR (2022), chronic HFpEF, h/o TIA, HTN, bipolar disease, who presented to [facility] on 9/15/2024 with SOB. Missed HD last Friday due to abdominal pain, vomiting and diarrhea. Since then has been SOB. He is at \"dry wt\" ~ 245-250 lbs. Denies LE edema. No CP. No fevers. BP has been high 180-190s. Last week had lower abd pain and dx'd with prostatitis, was taking levaquin. We are consulted for troponin ~ 500s. Again denies CP. Had cath  for + troponin that showed no sig CAD.     Medications:  Current Facility-Administered Medications   Medication Dose Route Frequency    ARIPiprazole (Abilify) tab 15 mg  15 mg Oral Daily    albuterol (Ventolin HFA) 108 (90 Base) MCG/ACT inhaler 2 puff  2 puff Inhalation Q6H PRN    umeclidinium bromide (Incruse Ellipta) 62.5 MCG/ACT inhaler 1 puff  1 puff Inhalation Daily    hydrALAzine (Apresoline) 20 mg/mL injection 10 mg  10 mg Intravenous Q6H PRN    amphetamine-dextroamphetamine (Adderall) tab 15 mg  15 mg Oral BID AC    HYDROmorphone (Dilaudid) 1 MG/ML injection 0.5 mg  0.5 mg Intravenous Q6H PRN    sodium chloride 0.9 % IV bolus 100 mL  100 mL Intravenous Q30 Min PRN    And    albumin human (Albumin) 25% injection 25 g  25 g Intravenous PRN Dialysis    heparin (Porcine) 1000 UNIT/ML injection 1,500 Units  1.5 mL Intracatheter PRN Dialysis    cloNIDine (Catapres) tab 0.1 mg  0.1 mg Oral Daily with food    buPROPion ER (Wellbutrin XL) 24  hr tab 150 mg  150 mg Oral Daily    carvedilol (Coreg) tab 25 mg  25 mg Oral BID with meals    dicyclomine (Bentyl) cap 10 mg  10 mg Oral TID PRN    fenofibrate micronized (Lofibra) cap 134 mg  134 mg Oral Nightly    FLUoxetine (PROzac) cap 40 mg  40 mg Oral Daily    fluticasone propionate (Flonase) 50 MCG/ACT nasal suspension 1 spray  1 spray Each Nare BID    hydrALAZINE (Apresoline) tab 50 mg  50 mg Oral BID    isosorbide mononitrate ER (Imdur) 24 hr tab 30 mg  30 mg Oral Daily    lamoTRIgine (LaMICtal) tab 150 mg  150 mg Oral Daily    levoFLOXacin (Levaquin) tab 250 mg  250 mg Oral Q48HR @ 0700    losartan (Cozaar) tab 100 mg  100 mg Oral Daily    NIFEdipine ER (Procardia-XL) 24 hr tab 60 mg  60 mg Oral BID    sevelamer carbonate (Renvela) tab 800 mg  800 mg Oral TID CC    tamsulosin (Flomax) cap 0.4 mg  0.4 mg Oral Daily    heparin (Porcine) 5000 UNIT/ML injection 5,000 Units  5,000 Units Subcutaneous Q8H MARTHA    acetaminophen (Tylenol Extra Strength) tab 500 mg  500 mg Oral Q4H PRN    acetaminophen (Tylenol) tab 650 mg  650 mg Oral Q4H PRN    Or    HYDROcodone-acetaminophen (Norco) 5-325 MG per tab 1 tablet  1 tablet Oral Q4H PRN    Or    HYDROcodone-acetaminophen (Norco) 5-325 MG per tab 2 tablet  2 tablet Oral Q4H PRN    polyethylene glycol (PEG 3350) (Miralax) 17 g oral packet 17 g  17 g Oral Daily PRN    sennosides (Senokot) tab 17.2 mg  17.2 mg Oral Nightly PRN    bisacodyl (Dulcolax) 10 MG rectal suppository 10 mg  10 mg Rectal Daily PRN    ondansetron (Zofran) 4 MG/2ML injection 4 mg  4 mg Intravenous Q6H PRN    prochlorperazine (Compazine) 10 MG/2ML injection 5 mg  5 mg Intravenous Q8H PRN       Past Medical History:    Asthma (HCC)    Attention deficit hyperactivity disorder (ADHD)    Back problem    Bipolar 1 disorder (HCC)    CKD (chronic kidney disease) stage 3, GFR 30-59 ml/min ([facility])    Dr Alausa    Congestive heart disease (HCC)    COPD (chronic obstructive pulmonary disease) (HCC)    Coronary  atherosclerosis    Deep vein thrombosis (HCC)    at age 17 R/T cast    Depression    Diabetes (HCC)    Essential hypertension    3/21 echo: severe concentric LVH with normal EF and no MR or pHTN    Extrinsic asthma, unspecified    Heart attack (HCC)    2016- angiogram- no intervention    Heart valve disease    mitral valve repair in 1994/    High blood pressure    High cholesterol    History of blood transfusion    History of mitral valve repair    Hyperlipidemia    Low back pain    tight and stiff after sweeping and mopping    LVH (left ventricular hypertrophy)    Migraines    Mixed hyperlipidemia     HDL 38 LDL 97 VLDL 57     Monoclonal gammopathy    IgG kappa     MVP (mitral valve prolapse)    Repair 1994 at [facility]; echoes as recently as 3/21 show mild or trivial MR and no stenosis    Neuropathy    Osteoarthritis    hip ,knees    Pneumonia due to organism    Pulmonary embolism (HCC)    Renal disorder    Stroke (HCC)    TIA (transient ischemic attack)    Initial history of left-sided weakness and slurred speech. (+) cocaine. MRI of the brain, CT angiogram of the head and neck, and 2D echo are all unremarkable.     TMJ (dislocation of temporomandibular joint)    Troponin level elevated    Trop 60 60 47 with TIA and no CP: Lexiscan negative with EF 51       Past Surgical History:   Procedure Laterality Date    Av fistula revision, open Left     Colonoscopy N/A 03/26/2023    Procedure: COLONOSCOPY;  Surgeon: Heath Vu MD;  Location:  ENDOSCOPY    Colonoscopy N/A 12/30/2023    Procedure: COLONOSCOPY with cold snare polypectomy and forcep polypectomy;  Surgeon: Ousmane Suarez MD;  Location:  ENDOSCOPY    Colonoscopy & polypectomy  2019    Egd  2019    Duodenitis. Biopsied. EUS for weight loss was negative    Heart surgery      Hernia surgery  08/17/2022    Dr Barnes    Laminectomy,>2 sgmt,lumbar  09/06/2018    L4-L5 Decomp Discectomy ROEM L4-L5    Mitralplasty w cp bypass  1994    [facility]: Repair     Repair rotator cuff,chronic Left     torn and had a ruptured bicep    Valve repair  1994    mitral valve       Family History  family history includes Alcohol and Other Disorders Associated in his father and maternal grandfather; Anxiety in his maternal aunt and maternal aunt; Bipolar Disorder in his maternal aunt; Cancer in his father, maternal grandfather, maternal uncle, mother, paternal aunt, and sister; Dementia in his father; Depression in his maternal aunt and maternal aunt; Diabetes in his maternal grandfather, maternal grandmother, and mother; Hypertension in his father, maternal grandfather, maternal grandmother, mother, paternal grandfather, paternal grandmother, and sister; Substance Abuse in his father.    Social History   reports that he quit smoking about 2 years ago. His smoking use included cigarettes. He started smoking about 29 years ago. He has a 27 pack-year smoking history. He has never been exposed to tobacco smoke. He has never used smokeless tobacco. He reports that he does not drink alcohol and does not use drugs.     Allergies  Allergies   Allergen Reactions    Hydrochlorothiazide RASH and HIVES       Review of Systems:  As per HPI, otherwise 10 point ROS is negative in detail.    Physical Exam:  Blood pressure (!) 169/113, pulse 107, temperature 98 °F (36.7 °C), temperature source Oral, resp. rate 17, height 74\", weight 245 lb (111.1 kg), SpO2 95%.  Temp (24hrs), Av.1 °F (36.7 °C), Min:97.3 °F (36.3 °C), Max:98.8 °F (37.1 °C)    Wt Readings from Last 3 Encounters:   09/15/24 245 lb (111.1 kg)   24 248 lb 8 oz (112.7 kg)   24 250 lb (113.4 kg)       General: Awake and alert; in no acute distress  HEENT: Extraocular movements are intact; sclerae are anicteric; scalp is atraumatic  Neck: Supple; no JVD; no carotid bruits  Cardiac: Regular rate and regular rhythm; normal S1 and S2, 2/6 HSM apex, no rubs, or gallops are appreciated  Lungs: Clear to auscultation bilaterally;  no accessory muscle use is noted, no wheezes, rhonci or rales  Abdomen: Soft, non-distended, non-tender; bowel sounds are normoactive  Extremities: Warm, no edema, clubbing or cyanosis; moves all 4 extremities normally, distal pulses intact and equal  Psychiatric: Normal mood and affect; answers questions appropriately  Dermatologic: No rashes; normal skin turgor    Diagnostic testing:    Labs:   Lab Results   Component Value Date    PT 13.9 12/14/2012    INR 1.11 09/15/2024    INR 1.05 08/30/2024     Lab Results   Component Value Date    LDL 71 09/15/2024    HDL 44 09/15/2024    TRIG 92 09/15/2024    VLDL 14 09/15/2024     Lab Results   Component Value Date    WBC 7.7 09/16/2024    HGB 9.3 09/16/2024    HCT 27.9 09/16/2024    .0 09/16/2024    CREATSERUM 10.05 09/16/2024     09/16/2024     09/16/2024    K 5.0 09/16/2024     09/16/2024    CO2 18.0 09/16/2024     09/16/2024    CA 8.9 09/16/2024    ALB 3.5 09/15/2024    ALKPHO 66 09/15/2024    BILT 0.6 09/15/2024    TP 6.8 09/15/2024    AST 55 09/15/2024    ALT 25 09/15/2024    PTT 27.8 09/15/2024    INR 1.11 09/15/2024    PTP 14.3 09/15/2024     09/15/2024       Cardiac diagnostics:    CXR 9/15/24  There is interval improvement in consolidation right lower lobe consistent with improved right lower lobe pneumonia or atelectasis.  The remainder of the lungs is clear.  Heart size is within normal limits.  Mediastinum and elenita are   unremarkable.  Right internal jugular tunneled dialysis catheter has been removed.     EKG 9/15/2024:   Normal sinus rhythm  Minimal voltage criteria for LVH, may be normal variant ( Sokolow-Rojas )  Borderline ECG     Echo 3/10/24:  1. Left ventricle: The cavity size was normal. There is moderarte concentric      left ventricular hypertrophy Systolic function was normal. The estimated      ejection fraction was 55-60%, by visual assessment. Wall motion is      normal; there are no regional wall motion  abnormalities.   2. Mitral valve: There was mild to moderate regurgitation. There is history      of annular ring repair The mean diastolic gradient was 2mm Hg.     Impression:  44 year old male presenting with SOB after missing HD session (last 5 days PTA)  Mild troponin elevation - chronic, does not reflect ACS  \"NSTEMI\" 4/23 with cath showing no sig CAD  H/o severe MR s/p MVR 1994, redo complex robotically assisted MVR ([place] 2022)  Chronic HFpEF  ESRD on HD  H/o DVT/PE 1/23 s/p Eliquis x 6 mo  Primary hyperaldosteronism  HTN - not controlled  H/o TIA  Bipolar disease  H/o cocaine use  H/o RLL PNA (8/8/24) treated with Abx, prednisone  Melena - C-scope, EGD no source, planned for capsule endoscopy  Chronic anemia    Recommendations:  HD per renal for volume and BP management  Continue usual home antihypertensive regimen for now with IV prn   Not on ASA due to recent GIB    Thank you for allowing our practice to participate in the care of your patient. Please do not hesitate to contact me if you have any questions.    Micheal Campos MD  Interventional Cardiology  [facility]  Office: [phone]

## 2024-09-17 LAB
ANION GAP SERPL CALC-SCNC: 13 MMOL/L (ref 0–18)
BASOPHILS # BLD AUTO: 0.07 X10(3) UL (ref 0–0.2)
BASOPHILS NFR BLD AUTO: 1.3 %
BUN BLD-MCNC: 51 MG/DL (ref 9–23)
CALCIUM BLD-MCNC: 8.4 MG/DL (ref 8.7–10.4)
CHLORIDE SERPL-SCNC: 103 MMOL/L (ref 98–112)
CO2 SERPL-SCNC: 23 MMOL/L (ref 21–32)
CREAT BLD-MCNC: 7.24 MG/DL
EGFRCR SERPLBLD CKD-EPI 2021: 9 ML/MIN/1.73M2 (ref 60–?)
EOSINOPHIL # BLD AUTO: 0.18 X10(3) UL (ref 0–0.7)
EOSINOPHIL NFR BLD AUTO: 3.2 %
ERYTHROCYTE [DISTWIDTH] IN BLOOD BY AUTOMATED COUNT: 15.1 %
GLUCOSE BLD-MCNC: 98 MG/DL (ref 70–99)
HCT VFR BLD AUTO: 28.6 %
HGB BLD-MCNC: 9.5 G/DL
IMM GRANULOCYTES # BLD AUTO: 0.01 X10(3) UL (ref 0–1)
IMM GRANULOCYTES NFR BLD: 0.2 %
LYMPHOCYTES # BLD AUTO: 1.13 X10(3) UL (ref 1–4)
LYMPHOCYTES NFR BLD AUTO: 20.4 %
MCH RBC QN AUTO: 31.5 PG (ref 26–34)
MCHC RBC AUTO-ENTMCNC: 33.2 G/DL (ref 31–37)
MCV RBC AUTO: 94.7 FL
MONOCYTES # BLD AUTO: 0.64 X10(3) UL (ref 0.1–1)
MONOCYTES NFR BLD AUTO: 11.5 %
NEUTROPHILS # BLD AUTO: 3.52 X10 (3) UL (ref 1.5–7.7)
NEUTROPHILS # BLD AUTO: 3.52 X10(3) UL (ref 1.5–7.7)
NEUTROPHILS NFR BLD AUTO: 63.4 %
OSMOLALITY SERPL CALC.SUM OF ELEC: 302 MOSM/KG (ref 275–295)
PLATELET # BLD AUTO: 228 10(3)UL (ref 150–450)
POTASSIUM SERPL-SCNC: 4.4 MMOL/L (ref 3.5–5.1)
RBC # BLD AUTO: 3.02 X10(6)UL
SODIUM SERPL-SCNC: 139 MMOL/L (ref 136–145)
WBC # BLD AUTO: 5.6 X10(3) UL (ref 4–11)

## 2024-09-17 PROCEDURE — 90935 HEMODIALYSIS ONE EVALUATION: CPT | Performed by: STUDENT IN AN ORGANIZED HEALTH CARE EDUCATION/TRAINING PROGRAM

## 2024-09-17 PROCEDURE — 85025 COMPLETE CBC W/AUTO DIFF WBC: CPT | Performed by: INTERNAL MEDICINE

## 2024-09-17 PROCEDURE — 80048 BASIC METABOLIC PNL TOTAL CA: CPT | Performed by: INTERNAL MEDICINE

## 2024-09-17 NOTE — PROGRESS NOTES
[facility]   part of [facility] Hospitalist Progress Note     Godwin Fonseca Patient Status:  Inpatient    1980 MRN [id number]   Location [facility] 7NE-A Attending Phong Freedman, DO   Hosp Day # 2 PCP Prieto Novak MD     Follow Up:  The primary encounter diagnosis was Stage 5 chronic kidney disease on chronic dialysis (HCC). Diagnoses of Abdominal pain, acute, Anemia, unspecified type, Acute on chronic congestive heart failure, unspecified heart failure type (HCC), Elevated troponin, and Hyperkalemia were also pertinent to this visit.    Subjective:     Patient seen and examined.   States he is feeling a little better but still weak.  Pt still c/o lower abd pain.  No F/C, N/V.     Objective:    Review of Systems:   10 point ROS completed and was negative, except for pertinent positive and negatives stated in subjective.    Vital signs:  Temp:  [97.6 °F (36.4 °C)-98 °F (36.7 °C)] 98 °F (36.7 °C)  Pulse:  [] 100  Resp:  [16-18] 18  BP: (105-120)/(58-80) 120/74  SpO2:  [91 %-94 %] 93 %    Physical Exam:    General: No acute distress.   HEENT:  EOMI, PERRLA, OP clear  Respiratory: Clear to auscultation bilaterally. No wheezes. No rhonchi.  Cardiovascular: S1, S2. Regular rate and rhythm. No murmurs.  Abdomen: Soft, nontender, nondistended.  Positive bowel sounds. No rebound or guarding.  Extremities: No edema.  Neuro:  Grossly non focal, no motor deficits.        Diagnostic Data:    Labs:  Recent Labs   Lab 09/15/24  1939 09/16/24  0645 24  0539   WBC 7.9 7.7 5.6   HGB 9.2* 9.3* 9.5*   MCV 95.5 96.9 94.7   .0 211.0 228.0   INR 1.11  --   --        Recent Labs   Lab 09/15/24  1939 09/16/24  0645 24  0539   GLU 98 101* 98   * 102* 51*   CREATSERUM 11.40* 10.05* 7.24*   CA 8.5 8.9 8.4*   ALB 3.5  --   --     140 139   K 5.6* 5.0 4.4    109 103   CO2 19.0* 18.0* 23.0   ALKPHO 66  --   --    AST 55*  --   --    ALT 25  --    --    BILT 0.6  --   --    TP 6.8  --   --        Estimated Creatinine Clearance: 14.8 mL/min (A) (based on SCr of 7.24 mg/dL (H)).    Recent Labs   Lab 09/15/24  1939   PTP 14.3   INR 1.11            COVID-19 Lab Results    COVID-19  Lab Results   Component Value Date    COVID19 Not Detected 09/15/2024    COVID19 Not Detected 08/11/2024    COVID19 Not Detected 05/15/2024       Pro-Calcitonin  No results for input(s): \"PCT\" in the last 168 hours.    Cardiac  Recent Labs   Lab 09/15/24  1939   PBNP 28,618*       Creatinine Kinase  No results for input(s): \"CK\" in the last 168 hours.    Inflammatory Markers  No results for input(s): \"CRP\", \"HARJEET\", \"LDH\", \"DDIMER\" in the last 168 hours.    Imaging: Imaging data reviewed in Epic.    Medications:    ARIPiprazole  15 mg Oral Daily    umeclidinium bromide  1 puff Inhalation Daily    amphetamine-dextroamphetamine  15 mg Oral BID AC    cloNIDine  0.1 mg Oral BID    buPROPion ER  150 mg Oral Daily    carvedilol  25 mg Oral BID with meals    fenofibrate micronized  134 mg Oral Nightly    FLUoxetine HCl  40 mg Oral Daily    fluticasone propionate  1 spray Each Nare BID    hydrALAZINE  50 mg Oral BID    isosorbide mononitrate ER  30 mg Oral Daily    lamoTRIgine  150 mg Oral Daily    levoFLOXacin  250 mg Oral Q48HR @ 0700    losartan  100 mg Oral Daily    NIFEdipine ER  60 mg Oral BID    sevelamer carbonate  800 mg Oral TID CC    tamsulosin  0.4 mg Oral Daily    heparin  5,000 Units Subcutaneous Q8H [facility]       Assessment & Plan:      44 yr old male with PMH sig for ESRD on HD (M/W/F), bipolar, depression, DM 2, HTN, DL, CAD, COPD, HFpEF, and chronic abdominal pain who presented to the ED for evaluation of shortness of breath.      # Stage 5 CKD with fluid overload   # Hyperkalemia   - suspect due to missed HD  - renal c/s  - HD per renal  - monitor K     # Acute on chronic diastolic HF  # Elevated troponin   # CAD  - suspect demand ischemia due to missed HD  - trop down trending    - last TTE 3/10/24 with intact LVEF  - cards c/s appreciated      # Anemia of chronic disease   # Hx of GI bleeding  - pt following with GI as outpt   - plan outpt capsule study   - monitor hgb     # Chronic abd pain  # Opioid dependence   - follows with pain clinic as outpt   - resume home pain medications   - minimize IV narcotics      # Prostatitis  - cont po levaquin     # Essential HTN  - BP elevated on admit  - resume home BP meds     # HLD  - cont statin     # COPD  - no evidence of acute exacerbation   - resume home inhalers      # Major depression, recurrent   # Bipolar d/o  - stable  - resume home medications     Plan of care: inpt care    Plan of care discussed with patient or family at bedside.    Phong Freedman, DO    Supplementary Documentation:     Quality:  DVT Prophylaxis: hep subq  CODE status: FULL  Abbott: no  Central line: no  If COVID testing is negative, may discontinue isolation: yes     Estimated date of discharge: later today vs tomorrow   Discharge is dependent on: clinical course   At this point Mr. Fonseca is expected to be discharge to: home    Plan of care discussed with pt

## 2024-09-17 NOTE — PROGRESS NOTES
[facility] Nephrology  Inpatient Follow-up    Godwin Fonseca Patient Status:  Inpatient    1980 MRN [id number]   [facility] 7NE-A Attending Phong Freedman, DO   Hosp Day # 2 PCP Prieto Novak MD       SUBJECTIVE:     Patient seen and examined at bedside. No acute complaints today.     MEDICATIONS:     Current Facility-Administered Medications   Medication Dose Route Frequency    ARIPiprazole (Abilify) tab 15 mg  15 mg Oral Daily    albuterol (Ventolin HFA) 108 (90 Base) MCG/ACT inhaler 2 puff  2 puff Inhalation Q6H PRN    umeclidinium bromide (Incruse Ellipta) 62.5 MCG/ACT inhaler 1 puff  1 puff Inhalation Daily    hydrALAzine (Apresoline) 20 mg/mL injection 10 mg  10 mg Intravenous Q6H PRN    amphetamine-dextroamphetamine (Adderall) tab 15 mg  15 mg Oral BID AC    HYDROmorphone (Dilaudid) 1 MG/ML injection 0.5 mg  0.5 mg Intravenous Q6H PRN    sodium chloride 0.9 % IV bolus 100 mL  100 mL Intravenous Q30 Min PRN    And    albumin human (Albumin) 25% injection 25 g  25 g Intravenous PRN Dialysis    heparin (Porcine) 1000 UNIT/ML injection 1,500 Units  1.5 mL Intracatheter PRN Dialysis    cloNIDine (Catapres) tab 0.1 mg  0.1 mg Oral BID    buPROPion ER (Wellbutrin XL) 24 hr tab 150 mg  150 mg Oral Daily    carvedilol (Coreg) tab 25 mg  25 mg Oral BID with meals    dicyclomine (Bentyl) cap 10 mg  10 mg Oral TID PRN    fenofibrate micronized (Lofibra) cap 134 mg  134 mg Oral Nightly    FLUoxetine (PROzac) cap 40 mg  40 mg Oral Daily    fluticasone propionate (Flonase) 50 MCG/ACT nasal suspension 1 spray  1 spray Each Nare BID    hydrALAZINE (Apresoline) tab 50 mg  50 mg Oral BID    isosorbide mononitrate ER (Imdur) 24 hr tab 30 mg  30 mg Oral Daily    lamoTRIgine (LaMICtal) tab 150 mg  150 mg Oral Daily    levoFLOXacin (Levaquin) tab 250 mg  250 mg Oral Q48HR @ 0700    losartan (Cozaar) tab 100 mg  100 mg Oral Daily    NIFEdipine ER (Procardia-XL) 24 hr tab 60 mg  60 mg Oral BID     sevelamer carbonate (Renvela) tab 800 mg  800 mg Oral TID CC    tamsulosin (Flomax) cap 0.4 mg  0.4 mg Oral Daily    heparin (Porcine) 5000 UNIT/ML injection 5,000 Units  5,000 Units Subcutaneous Q8H MARTHA    acetaminophen (Tylenol Extra Strength) tab 500 mg  500 mg Oral Q4H PRN    acetaminophen (Tylenol) tab 650 mg  650 mg Oral Q4H PRN    Or    HYDROcodone-acetaminophen (Norco) 5-325 MG per tab 1 tablet  1 tablet Oral Q4H PRN    Or    HYDROcodone-acetaminophen (Norco) 5-325 MG per tab 2 tablet  2 tablet Oral Q4H PRN    polyethylene glycol (PEG 3350) (Miralax) 17 g oral packet 17 g  17 g Oral Daily PRN    sennosides (Senokot) tab 17.2 mg  17.2 mg Oral Nightly PRN    bisacodyl (Dulcolax) 10 MG rectal suppository 10 mg  10 mg Rectal Daily PRN    ondansetron (Zofran) 4 MG/2ML injection 4 mg  4 mg Intravenous Q6H PRN    prochlorperazine (Compazine) 10 MG/2ML injection 5 mg  5 mg Intravenous Q8H PRN       PHYSICAL EXAM:     Vital Signs: /74 (BP Location: Right arm)   Pulse 100   Temp 98 °F (36.7 °C) (Oral)   Resp 18   Ht 6' 2\" (1.88 m)   Wt 245 lb (111.1 kg)   SpO2 93%   BMI 31.46 kg/m²   Temp (24hrs), Av.8 °F (36.6 °C), Min:97.6 °F (36.4 °C), Max:98 °F (36.7 °C)       Intake/Output Summary (Last 24 hours) at 2024 1333  Last data filed at 2024 0400  Gross per 24 hour   Intake 360 ml   Output 3200 ml   Net -2840 ml     Wt Readings from Last 3 Encounters:   24 245 lb (111.1 kg)   24 248 lb 8 oz (112.7 kg)   24 250 lb (113.4 kg)       General: no acute distress  HEENT: normocephalic, atraumatic  CV: RRR  Respiratory: no distress  Abdomen: soft, non-tender  Extremities: no edema bilaterally  Skin: warm, dry  Neuro: awake, alert     LABORATORY DATA:     Lab Results   Component Value Date    GLU 98 2024    BUN 51 (H) 2024    BUNCREA 13.0 2022    CREATSERUM 7.24 (H) 2024    ANIONGAP 13 2024    GFR 59 (L) 2018    GFRNAA 30 (L) 2022    GFRAA  35 (L) 07/12/2022    CA 8.4 (L) 09/17/2024    OSMOCALC 302 (H) 09/17/2024    ALKPHO 66 09/15/2024    AST 55 (H) 09/15/2024    ALT 25 09/15/2024    BILT 0.6 09/15/2024    TP 6.8 09/15/2024    ALB 3.5 09/15/2024    GLOBULIN 3.3 09/15/2024     09/17/2024    K 4.4 09/17/2024     09/17/2024    CO2 23.0 09/17/2024     Lab Results   Component Value Date    WBC 5.6 09/17/2024    RBC 3.02 (L) 09/17/2024    HGB 9.5 (L) 09/17/2024    HCT 28.6 (L) 09/17/2024    .0 09/17/2024    MPV 11.5 12/14/2012    MCV 94.7 09/17/2024    MCH 31.5 09/17/2024    MCHC 33.2 09/17/2024    RDW 15.1 09/17/2024    NEPRELIM 3.52 09/17/2024    NEPERCENT 63.4 09/17/2024    LYPERCENT 20.4 09/17/2024    MOPERCENT 11.5 09/17/2024    EOPERCENT 3.2 09/17/2024    BAPERCENT 1.3 09/17/2024    NE 3.52 09/17/2024    LYMABS 1.13 09/17/2024    MOABSO 0.64 09/17/2024    EOABSO 0.18 09/17/2024    BAABSO 0.07 09/17/2024     Lab Results   Component Value Date    MALBP 167.00 05/24/2023    CREUR 142.00 05/24/2023    CREUR 142.00 05/24/2023     Lab Results   Component Value Date    COLORUR Colorless (A) 09/16/2024    CLARITY Clear 09/16/2024    SPECGRAVITY 1.012 09/16/2024    GLUUR Normal 09/16/2024    BILUR Negative 09/16/2024    KETUR Negative 09/16/2024    BLOODURINE 3+ (A) 09/16/2024    PHURINE 6.0 09/16/2024    PROUR 50 (A) 09/16/2024    UROBILINOGEN Normal 09/16/2024    NITRITE Negative 09/16/2024    LEUUR Negative 09/16/2024    WBCUR 1-5 09/16/2024    RBCUR 0-2 09/16/2024    EPIUR Few (A) 09/16/2024    BACUR None Seen 09/16/2024    CAOXUR Occasional (A) 04/20/2018    HYLUR Present (A) 05/14/2019       IMAGING:     Reviewed    ASSESSMENT:      # ESRD on HD  -MWF schedule outpatient  -[facility]  -L AVF     # HTN/Volume Status     # Hyperkalemia     # Metabolic acidosis     # Anemia     # Secondary Hyperparathyroidism     PLAN:      -HD today per MWF schedule  -Additional session 9/17  -UF with HD as tolerated  -Continue home BP  medications - change clonidine to 0.1mg BID to avoid rebound  -Defer OWEN in setting of high BPs  -Continue sevelamer, will check phosphorus levels  -Avoid nephrotoxins and renally dose medications for creatinine clearance  -Monitor intake and output daily  -Daily weights        Okay for discharge after dialysis today. If still admitted, will dialyze tomorrow per normal schedule.     Thank you for allowing us to participate in the care of this patient.       Samantha Muñoz DO   [facility] [place] - Nephrology

## 2024-09-17 NOTE — PLAN OF CARE
Patient is A&Ox4, NSR, RA  Ultrasound of LLE still pending  HD completed today. 3L of fluids removed  Comfort and safety rounds done. Call light in reach  C/O severe pain managed with prn medications  Patient updated on care plan and discharge plan

## 2024-09-17 NOTE — DISCHARGE SUMMARY
[facility] Internal Medicine Hospitalist Discharge Summary     Patient ID:  Godwin Fonseca  44 year old  4/12/1980    Admit date: 9/15/2024    Discharge date and time: 9/20/2024    Attending Physician: Phong Freedman DO     Primary Care Physician: Prieto Novak MD     Discharge Diagnoses:   Stage 5 CKD with fulid overload   LUE AVF stenosis  Hyperkalemia   Chronic diastolic HF  Elevated trop  CAD  Anemia of chronic disease   Chronic abd pain  HTN  COPD  HLD  Depression  Bipolar d/o    Please note that only IHP DMG and EMG patients enrolled in the Medicare ACO, [facility] ACO and [facility] HMOs will be handled by the [facility] Care Management team.  For all other patients, please follow usual protocol for discharge care transition.    Discharge Condition: stable    Disposition:  Home    Important Follow up:  - PCP: 3-5 days  - Consults: Cards, renal     Follow Up Items:  None    Hospital Course:      44 yr old male with PMH sig for ESRD on HD (M/W/F), bipolar, depression, DM 2, HTN, DL, CAD, COPD, HFpEF, and chronic abdominal pain who presented to the ED for evaluation of shortness of breath.      # Stage 5 CKD with fluid overload   # Hyperkalemia   # Stenosis of LUE AVF  - suspect due to missed HD  - renal c/s  - HD per renal  - monitor K  - 9/20 permcath placed  - vascular c/s, recommend rest of AVF for 1 month, then see outside vascular surgeon     # Acute on chronic diastolic HF  # Elevated troponin   # CAD  - suspect demand ischemia due to missed HD  - trop down trending   - last TTE 3/10/24 with intact LVEF  - cards c/s appreciated      # Anemia of chronic disease   # Hx of GI bleeding  - pt following with GI as outpt   - plan outpt capsule study   - monitor hgb     # Chronic abd pain  # Opioid dependence   - follows with pain clinic as outpt   - resume home pain medications   - minimize IV narcotics      # Prostatitis  - cont po levaquin      # L calf pain  -  LLE venous dopplers neg for DVT     # Essential HTN  - BP elevated on admit  - resume home BP meds     # HLD  - cont statin     # COPD  - no evidence of acute exacerbation   - resume home inhalers      # Major depression, recurrent   # Bipolar d/o  - stable  - resume home medications     Stable for discharge home.    Consults: IP CONSULT TO NEPHROLOGY  IP CONSULT TO HOSPITALIST  IP CONSULT TO SOCIAL WORK  NURSING CONSULT TO DIETITIAN  IP CONSULT TO CARDIOLOGY    Operative Procedures:  None      Patient instructions:      I as the attending physician reconciled the current and discharge medications on day of discharge.        Discharge Medications        START taking these medications        Instructions Prescription details   HYDROcodone-acetaminophen  MG Tabs  Commonly known as: Norco      Take 1 tablet by mouth every 6 (six) hours as needed for Pain.   Quantity: 20 tablet  Refills: 0            CHANGE how you take these medications        Instructions Prescription details   cloNIDine 0.1 MG Tabs  Commonly known as: Catapres  What changed: when to take this      Take 1 tablet (0.1 mg total) by mouth 2 (two) times daily.   Quantity: 60 tablet  Refills: 0            CONTINUE taking these medications        Instructions Prescription details   albuterol 108 (90 Base) MCG/ACT Aers  Commonly known as: Ventolin HFA      Inhale 2 puffs into the lungs every 6 (six) hours as needed for Wheezing.   Refills: 0     ARIPiprazole 5 MG Tabs  Commonly known as: Abilify      Take 3 tablets (15 mg total) by mouth daily.   Refills: 0     buPROPion  MG Tb24  Commonly known as: Wellbutrin XL      Take 1 tablet (150 mg total) by mouth daily.   Refills: 0     carvedilol 25 MG Tabs  Commonly known as: Coreg      Take 1 tablet (25 mg total) by mouth in the morning and 1 tablet (25 mg total) in the evening. Take with meals.   Quantity: 60 tablet  Refills: 6     dicyclomine 10 MG Caps  Commonly known as: Bentyl      Take 1 capsule  (10 mg total) by mouth 3 (three) times daily as needed.   Quantity: 20 capsule  Refills: 0     Fenofibrate 134 MG Caps      Take 1 capsule by mouth nightly.   Quantity: 90 capsule  Refills: 1     FLUoxetine HCl 40 MG Caps  Commonly known as: PROZAC      Take 1 capsule (40 mg total) by mouth daily.   Quantity: 7 capsule  Refills: 0     fluticasone propionate 50 MCG/ACT Susp  Commonly known as: Flonase      SPRAY ONCE INTO EACH NOSTRIL BID PRN   Quantity: 15.8 mL  Refills: 0     hydrALAZINE 50 MG Tabs  Commonly known as: Apresoline      Take 1 tablet (50 mg total) by mouth in the morning and 1 tablet (50 mg total) before bedtime. Hold if systolic blood pressure <130.   Quantity: 30 tablet  Refills: 0     isosorbide mononitrate ER 30 MG Tb24  Commonly known as: Imdur      Take 1 tablet (30 mg total) by mouth daily. Hold if systolic blood pressure <130   Refills: 0     lamoTRIgine 150 MG Tabs  Commonly known as: LaMICtal      Take 1 tablet (150 mg total) by mouth daily.   Quantity: 7 tablet  Refills: 0     levoFLOXacin 250 MG Tabs  Commonly known as: Levaquin      TAKE 2 TABLETs BY MOUTH one time then take 1 tablet every 48 hours for 30 days   Refills: 0     Lisdexamfetamine Dimesylate 60 MG Caps  Commonly known as: VYVANSE      Take 70 mg by mouth every morning.   Refills: 0     losartan 100 MG Tabs  Commonly known as: Cozaar      Take 1 tablet (100 mg total) by mouth daily. Hold if systolic blood pressure <130   Refills: 0     NIFEdipine ER 60 MG Tb24  Commonly known as: ADALAT CC      Take 1 tablet (60 mg total) by mouth in the morning and 1 tablet (60 mg total) before bedtime. Hold if systolic blood pressure <130.   Refills: 0     Renvela 800 MG Tabs  Generic drug: sevelamer carbonate      Take 1 tablet (800 mg total) by mouth 3 (three) times daily with meals.   Refills: 0     tamsulosin 0.4 MG Caps  Commonly known as: Flomax      Take 1 capsule (0.4 mg total) by mouth daily.   Refills: 0     tiotropium 18 MCG  Caps  Commonly known as: Spiriva Handihaler      Inhale 1 capsule (18 mcg total) into the lungs daily.   Refills: 0            STOP taking these medications      hydrocortisone 25 MG Supp  Commonly known as: Anusol-HC                  Where to Get Your Medications        These medications were sent to [id number] - [place], [place] - [address] [phone], [phone]  [address], [facility] [address]      Phone: [phone]   cloNIDine 0.1 MG Tabs  HYDROcodone-acetaminophen  MG Tabs          Activity: activity as tolerated  Diet: renal diet  Wound Care: as directed  Code Status: Full Code      Exam on day of discharge:     Vitals:    09/17/24 0740   BP: 120/74   Pulse: 100   Resp: 18   Temp: 98 °F (36.7 °C)       Gen: No acute distress, alert and oriented x3, no focal neurologic deficits  HEENT:  EOMI, PERRLA, OP clear, MMM  Pulm:Diminished at the bases , normal respiratory effort  CV: Tachy, reg, no murmur.  Normal PMI.    Abd: Abdomen soft, nontender, nondistended, no organomegaly, bowel sounds present  MSK: Full range of motion in extremities, no clubbing, no cyanosis  Skin: no rashes or lesions  Neuro:  Grossly intact, no focal deficits  Lines:  R chest permcath in place    Total time coordinating care for discharge: Greater than 30 minutes    Phong Freedman DO  [facility] Hospitalist

## 2024-09-17 NOTE — PAYOR COMM NOTE
ADMISSION REVIEW     Payor: [employer]  Subscriber #:  [id number]  Authorization Number: [id number]    Admit date: 9/15/24  Admit time: 11:02 PM       REVIEW DOCUMENTATION:     ED Provider Notes        ED Provider Notes signed by Mckinley Saunders MD at 9/15/2024 10:32 PM       Author: Mckinley Saunders MD Service: -- Author Type: Physician    Filed: 9/15/2024 10:32 PM Date of Service: 9/15/2024  7:19 PM Status: Signed    : Mckinley Saunders MD (Physician)           Patient Seen in: [facility] In [place]      History     Chief Complaint   Patient presents with    Difficulty Breathing     Stated Complaint: perla, abd pain    Subjective:   HPI    Patient a 44-year-old male has a history of renal failure and gets dialysis Monday Wednesday Friday.  Patient states he was diagnosed with prostatitis last week started Levaquin.  Patient also states he has had chronic GI bleeding has seen GI had a scope was supposed to have a capsule study which he missed on Tuesday.  Patient states he did have some vomiting diarrhea and missed his dialysis on Friday.  Patient states yesterday started getting short of breath.  Patient states he had significant abdominal pain as well.  Patient states he feels like he is fluid overloaded.  Patient denies chest pain, no pain with deep breaths.  Patient remainder of review of systems negative.    Objective:   Past Medical History:    Asthma (HCC)    Attention deficit hyperactivity disorder (ADHD)    Back problem    Bipolar 1 disorder (HCC)    CKD (chronic kidney disease) stage 3, GFR 30-59 ml/min ([facility])    Dr Meeks    Congestive heart disease ([facility])    COPD (chronic obstructive pulmonary disease) ([facility])    Coronary atherosclerosis    Deep vein thrombosis ([facility])    at age 17 R/T cast    Depression    Diabetes ([facility])    Essential hypertension    3/21 echo: severe concentric LVH with normal EF and no MR or pHTN    Extrinsic asthma, unspecified    Heart attack ([facility])    2016-  angiogram- no intervention    Heart valve disease    mitral valve repair in 1994/    High blood pressure    High cholesterol    History of blood transfusion    History of mitral valve repair    Hyperlipidemia    Low back pain    tight and stiff after sweeping and mopping    LVH (left ventricular hypertrophy)    Migraines    Mixed hyperlipidemia     HDL 38 LDL 97 VLDL 57     Monoclonal gammopathy    IgG kappa     MVP (mitral valve prolapse)    Repair 1994 at [place]; echoes as recently as 3/21 show mild or trivial MR and no stenosis    Neuropathy    Osteoarthritis    hip ,knees    Pneumonia due to organism    Pulmonary embolism (HCC)    Renal disorder    Stroke (HCC)    TIA (transient ischemic attack)    Initial history of left-sided weakness and slurred speech. (+) cocaine. MRI of the brain, CT angiogram of the head and neck, and 2D echo are all unremarkable.     TMJ (dislocation of temporomandibular joint)    Troponin level elevated    Trop 60 60 47 with TIA and no CP: Lexiscan negative with EF 51              Past Surgical History:   Procedure Laterality Date    Av fistula revision, open Left     Colonoscopy N/A 03/26/2023    Procedure: COLONOSCOPY;  Surgeon: Heath Vu MD;  Location:  ENDOSCOPY    Colonoscopy N/A 12/30/2023    Procedure: COLONOSCOPY with cold snare polypectomy and forcep polypectomy;  Surgeon: Ousmane Suarez MD;  Location:  ENDOSCOPY    Colonoscopy & polypectomy  2019    [facility]  2019    Duodenitis. Biopsied. EUS for weight loss was negative    Heart surgery      Hernia surgery  08/17/2022    Dr Barnes    Laminectomy,>2 sgmt,lumbar  09/06/2018    L4-L5 Decomp Discectomy ROEM L4-L5    Mitralplasty w cp bypass  1994    [place]: Repair    Repair rotator cuff,chronic Left     torn and had a ruptured bicep    Valve repair  1994    mitral valve                Social History     Socioeconomic History    Marital status:    Tobacco Use    Smoking status: Former     Current  packs/day: 0.00     Average packs/day: 1 pack/day for 27.0 years (27.0 ttl pk-yrs)     Types: Cigarettes     Start date: 1995     Quit date: 2022     Years since quittin.5     Passive exposure: Never    Smokeless tobacco: Never   Vaping Use    Vaping status: Never Used   Substance and Sexual Activity    Alcohol use: No    Drug use: No     Social Determinants of Health     Financial Resource Strain: Medium Risk (2024)    Received from [facility]    Overall Financial Resource Strain (CARDIA)     Difficulty of Paying Living Expenses: Somewhat hard   Food Insecurity: No Food Insecurity (2024)    Food Insecurity     Food Insecurity: Never true   Transportation Needs: No Transportation Needs (2024)    Transportation Needs     Lack of Transportation: No   Physical Activity: Inactive (2024)    Received from [facility]    Exercise Vital Sign     Days of Exercise per Week: 0 days     Minutes of Exercise per Session: 0 min   Stress: Stress Concern Present (2024)    Received from [facility]    [employer] [place] [employer] - Occupational Stress Questionnaire     Feeling of Stress : Rather much   Social Connections: Unknown (2024)    Received from [facility]    Social Connection and Isolation Panel [NHANES]     Frequency of Communication with Friends and Family: More than three times a week     Frequency of Social Gatherings with Friends and Family: More than three times a week     Attends Protestant Services: Never     Active Member of Clubs or Organizations: No     Attends Club or Organization Meetings: Never     Marital Status: Patient unable to answer   Housing Stability: Low Risk  (2024)    Housing Stability     Housing Instability: No          Former smoker 30 years quit 2 years ago.  No alcohol or drugs    Review of Systems    Positive for stated Chief Complaint: Difficulty Breathing    Other systems are as noted in  HPI.  Constitutional and vital signs reviewed.      All other systems reviewed and negative except as noted above.    Physical Exam     ED Triage Vitals   BP 09/15/24 1905 (!) 193/130   Pulse 09/15/24 1905 91   Resp 09/15/24 1905 (!) 28   Temp 09/15/24 1905 98.8 °F (37.1 °C)   Temp src 09/15/24 2224 Temporal   SpO2 09/15/24 1905 98 %   O2 Device 09/15/24 1905 None (Room air)       Current Vitals:   Vital Signs  BP: (!) 192/142  Pulse: 92  Resp: 20  Temp: 98.2 °F (36.8 °C)  Temp src: Temporal    Oxygen Therapy  SpO2: 95 %  O2 Device: None (Room air)            Physical Exam  GENERAL: Patient resting  no calf tenderness or edema on the cart in no acute distress.  HEENT: Extraocular muscles intact, pupils equal round reactive to light and accommodation.  Mouth normal, neck supple, no meningismus.  LUNGS: Lungs clear to auscultation bilaterally.  CARDIOVASCULAR: + S1-S2, regular rate and rhythm, no murmurs.  BACK: No CVA tenderness, no midline bony tenderness.  ABDOMEN: + Bowel sounds, soft, moderate tenderness periumbilical, nondistended.  No rebound, no guarding, no hepatosplenomegaly.'  Rectal exam Hemoccult negative brown stool  EXTREMITIES: Full range of motion, no tenderness, good capillary refill.  No calf tenderness or edema  SKIN: No rash, good turgor.  NEURO: Patient answers questions appropriately.  No focal deficits appreciated.  Conversant           ED Course     Labs Reviewed   COMP METABOLIC PANEL (14) - Abnormal; Notable for the following components:       Result Value    Potassium 5.6 (*)     CO2 19.0 (*)      (*)     Creatinine 11.40 (*)     Calculated Osmolality 321 (*)     eGFR-Cr 5 (*)     AST 55 (*)     All other components within normal limits   CBC WITH DIFFERENTIAL WITH PLATELET - Abnormal; Notable for the following components:    RBC 2.88 (*)     HGB 9.2 (*)     HCT 27.5 (*)     All other components within normal limits   LIPASE - Abnormal; Notable for the following components:    Lipase  135 (*)     All other components within normal limits   TROPONIN I HIGH SENSITIVITY - Abnormal; Notable for the following components:    Troponin I (High Sensitivity) 552 (*)     All other components within normal limits   PRO BETA NATRIURETIC PEPTIDE - Abnormal; Notable for the following components:    Pro-Beta Natriuretic Peptide 28,618 (*)     All other components within normal limits   PROTHROMBIN TIME (PT) - Normal   PTT, ACTIVATED - Normal   RAPID SARS-COV-2 BY PCR - Normal   URINALYSIS WITH CULTURE REFLEX   LIPID PANEL     EKG    Rate, intervals and axes as noted on EKG Report.  Rate: 90  Rhythm: Sinus Rhythm  Reading: Normal sinus rhythm, nonspecific ST changes                 Chest x-ray1. There is interval removal of right tunneled dialysis catheter.   2. There is improvement in opacity right lung base consistent with approved atelectasis or pneumonia.    Independent reviewed by myself, no pneumothorax    CT abdomen pel1. A specific etiology for abdominal pain is not evident from this study.   2. There is diverticulosis of colon without CT evidence of diverticulitis.   3. Small right pleural effusion has decreased since prior study.            MDM      Patient was given pain medicines for his abdominal pain.  Patient was given Lasix as he states he still makes urine.  Patient was given clonidine for his elevated blood pressure.  Patient hemoglobin but stable.  With patient's pain fluid overload need for dialysis will be admitted for further evaluation.  I did speak with hospitalist.  I did speak with nephrology who recommended additional Lasix continue clonidine for blood pressure and patient will be dialyzed in the hospital.  I did consider renal failure, CHF, diverticulitis GI bleed, anemia  Admission disposition: 9/15/2024 10:18 PM      Medical Decision Making      Disposition and Plan     Clinical Impression:  1. Stage 5 chronic kidney disease on chronic dialysis (HCC)    2. Abdominal pain, acute    3.  Anemia, unspecified type    4. Acute on chronic congestive heart failure, unspecified heart failure type (HCC)    5. Elevated troponin    6. Hyperkalemia         Disposition:  Admit  9/15/2024 10:18 pm      Hospital Problems       Present on Admission  Date Reviewed: 8/8/2024            ICD-10-CM Noted POA    * (Principal) Stage 5 chronic kidney disease on chronic dialysis (HCC) N18.6, Z99.2 9/15/2024 Unknown           Signed by Mckinley Saunders MD on 9/15/2024 10:32 PM         MEDICATIONS ADMINISTERED IN LAST 1 DAY:  albumin human (Albumin) 25% injection 25 g       Date Action Dose Route User    9/16/2024 1553 New Bag 25 g Intravenous Lizzy Ingram RN          buPROPion ER (Wellbutrin XL) 24 hr tab 150 mg       Date Action Dose Route User    9/17/2024 0826 Given 150 mg Oral Александр Winston RN          carvedilol (Coreg) tab 25 mg       Date Action Dose Route User    9/17/2024 0743 Given 25 mg Oral Mary Lou Mckeon RN    9/16/2024 1900 Given 25 mg Oral Adrian Herzog RN          cloNIDine (Catapres) tab 0.1 mg       Date Action Dose Route User    9/17/2024 0826 Given 0.1 mg Oral Александр Winston RN    9/16/2024 2344 Given 0.1 mg Oral Mary Lou Mckeon RN          fenofibrate micronized (Lofibra) cap 134 mg       Date Action Dose Route User    9/16/2024 2029 Given 134 mg Oral Mary Lou Mckeon RN          FLUoxetine (PROzac) cap 40 mg       Date Action Dose Route User    9/17/2024 0825 Given 40 mg Oral Александр Winston RN          fluticasone propionate (Flonase) 50 MCG/ACT nasal suspension 1 spray       Date Action Dose Route User    9/17/2024 0825 Given 1 spray Each Nare Александр Winston RN          heparin (Porcine) 5000 UNIT/ML injection 5,000 Units       Date Action Dose Route User    9/17/2024 1320 Given 5,000 Units Subcutaneous (Left Lower Abdomen) Александр Winston RN    9/17/2024 0742 Given 5,000 Units Subcutaneous (Left Lower Abdomen) Mary Lou Mckeon RN    9/16/2024 2204 Given 5,000 Units Subcutaneous (Left Lower  Abdomen) Mary Lou Mckeon RN          hydrALAZINE (Apresoline) tab 50 mg       Date Action Dose Route User    9/17/2024 0826 Given 50 mg Oral Александр Winston RN    9/16/2024 2029 Given 50 mg Oral Mary Lou Mckeon RN          HYDROcodone-acetaminophen (Norco) 5-325 MG per tab 2 tablet       Date Action Dose Route User    9/17/2024 0844 Given 2 tablet Oral Александр Winston RN    9/17/2024 0402 Given 2 tablet Oral Mary Lou Mckeon RN    9/16/2024 1900 Given 2 tablet Oral Adrian Herzog RN          HYDROmorphone (Dilaudid) 1 MG/ML injection 0.5 mg       Date Action Dose Route User    9/17/2024 1321 Given 0.5 mg Intravenous Александр Winston RN    9/17/2024 0742 Given 0.5 mg Intravenous Mary Lou Mckeon RN    9/16/2024 2235 Given 0.5 mg Intravenous Mary Lou Mckeon RN    9/16/2024 1637 Given 0.5 mg Intravenous Adrian Herzog RN          isosorbide mononitrate ER (Imdur) 24 hr tab 30 mg       Date Action Dose Route User    9/17/2024 0826 Given 30 mg Oral Александр Winston RN          losartan (Cozaar) tab 100 mg       Date Action Dose Route User    9/17/2024 0826 Given 100 mg Oral Александр Winston RN          NIFEdipine ER (Procardia-XL) 24 hr tab 60 mg       Date Action Dose Route User    9/17/2024 0826 Given 60 mg Oral Александр Winston RN    9/16/2024 2204 Given 60 mg Oral Mary Lou Mckeon RN          ondansetron (Zofran) 4 MG/2ML injection 4 mg       Date Action Dose Route User    9/17/2024 0844 Given 4 mg Intravenous Александр Winston RN    9/16/2024 1550 Given 4 mg Intravenous Lizzy Ingram RN          sevelamer carbonate (Renvela) tab 800 mg       Date Action Dose Route User    9/17/2024 1320 Given 800 mg Oral Александр Winston RN    9/17/2024 0825 Given 800 mg Oral Александр Winston RN    9/16/2024 1900 Given 800 mg Oral Adrian Herzog RN          tamsulosin (Flomax) cap 0.4 mg       Date Action Dose Route User    9/17/2024 0826 Given 0.4 mg Oral Александр Winston RN          umeclidinium bromide (Incruse Ellipta) 62.5 MCG/ACT  inhaler 1 puff       Date Action Dose Route User    9/17/2024 0825 Given 1 puff Inhalation Александр Winston RN          lamoTRIgine (LaMICtal) tab 150 mg       Date Action Dose Route User    9/17/2024 0825 Given 150 mg Oral Александр Winston RN          ARIPiprazole (Abilify) tab 15 mg       Date Action Dose Route User    9/17/2024 0827 Given 15 mg Oral Александр Winston RN          amphetamine-dextroamphetamine (Adderall) tab 15 mg       Date Action Dose Route User    9/17/2024 0743 Given 15 mg Oral Mary Lou Mckeon RN            Vitals (last day)       Date/Time Temp Pulse Resp BP SpO2 Weight O2 Device O2 Flow Rate (L/min) [facility]    09/17/24 1200 97.8 °F (36.6 °C) 85 20 101/66 94 % -- None (Room air) --     09/17/24 0740 98 °F (36.7 °C) 100 18 120/74 93 % -- None (Room air) -- AT    09/17/24 0400 97.7 °F (36.5 °C) 88 -- 105/58 94 % -- None (Room air) --     09/17/24 0000 97.9 °F (36.6 °C) 97 -- 110/79 91 % -- None (Room air) --     09/16/24 2330 -- -- -- 110/79 -- -- -- --     09/16/24 2230 -- -- -- 108/69 -- -- -- --     09/16/24 2200 -- 93 -- 107/69 93 % -- None (Room air) --     09/16/24 2030 -- 92 17 107/80 93 % -- None (Room air) --     09/16/24 2015 97.6 °F (36.4 °C) 94 16 -- 94 % -- None (Room air) --     09/16/24 2015 -- -- -- 111/71 -- -- -- --     09/16/24 1751 -- -- -- -- -- 245 lb (111.1 kg) -- --     09/16/24 1500 97.8 °F (36.6 °C) 85 17 115/74 92 % -- None (Room air) --     09/16/24 1200 97.6 °F (36.4 °C) 89 16 122/77 95 % -- None (Room air) --     09/16/24 0919 -- 107 -- -- -- -- -- -- NC    09/16/24 0830 98 °F (36.7 °C) 102 17 169/113 95 % -- None (Room air) --     09/16/24 0735 -- 115 35 -- 97 % -- -- --     09/16/24 0638 -- 92 -- 168/110 96 % -- None (Room air) -- SK    09/16/24 0400 98.4 °F (36.9 °C) 90 13 184/122 93 % -- None (Room air) -- EDIN         [facility]2024 [facility]           Chief Complaint   Patient presents with     Difficulty Breathing         PCP: Prieto Novak MD        History of Present Illness: Patient is a 44 year old male with PMH sig for ESRD on HD (M/W/F), bipolar, depression, DM 2, HTN, DL, CAD, COPD, HFpEF, and chronic abdominal pain who presented to the ED for evaluation of shortness of breath.  He states he was diagnosed with prostatitis last week, was started on levaquin.  He also has chronic abd pain and GI bleeding, follows with GI, was to have an outpt capsule study which he missed on Tuesday.  Pt reports vomiting and diarrhea on Friday so he missed his HD session.  Yesterday he started to feel SOB, feels fluid overloaded.   No F/C, or bright red blood per rectum.  Denies sick contacts.       In the ED, BUN/Cr 104/11.40, K 5.6 .  Trop 552.  Hgb stable at 9.3.  CT a/p neg for acute pathology.       On my evaluation, pt denies feeling SOB at rest, c/o lower abd pain which he thinks he related to his prostatitis.       Past Medical History       Past Medical History:    Asthma (HCC)    Attention deficit hyperactivity disorder (ADHD)    Back problem    Bipolar 1 disorder (HCC)    CKD (chronic kidney disease) stage 3, GFR 30-59 ml/min (HCC)     Dr Meeks    Congestive heart disease (HCC)    COPD (chronic obstructive pulmonary disease) (HCC)    Coronary atherosclerosis    Deep vein thrombosis (HCC)     at age 17 R/T cast    Depression    Diabetes (HCC)    Essential hypertension     3/21 echo: severe concentric LVH with normal EF and no MR or pHTN    Extrinsic asthma, unspecified    Heart attack (HCC)     2016- angiogram- no intervention    Heart valve disease     mitral valve repair in 1994/    High blood pressure    High cholesterol    History of blood transfusion    History of mitral valve repair    Hyperlipidemia    Low back pain     tight and stiff after sweeping and mopping    LVH (left ventricular hypertrophy)    Migraines    Mixed hyperlipidemia      HDL 38 LDL 97 VLDL 57     Monoclonal  gammopathy     IgG kappa     MVP (mitral valve prolapse)     Repair  at [place]; echoes as recently as 3/21 show mild or trivial MR and no stenosis    Neuropathy    Osteoarthritis     hip ,knees    Pneumonia due to organism    Pulmonary embolism (HCC)    Renal disorder    Stroke (HCC)    TIA (transient ischemic attack)     Initial history of left-sided weakness and slurred speech. (+) cocaine. MRI of the brain, CT angiogram of the head and neck, and 2D echo are all unremarkable.     TMJ (dislocation of temporomandibular joint)    Troponin level elevated     Trop 60 60 47 with TIA and no CP: Lexiscan negative with EF 51         Past Surgical History         Past Surgical History:   Procedure Laterality Date    Av fistula revision, open Left      Colonoscopy N/A 2023     Procedure: COLONOSCOPY;  Surgeon: Heath Vu MD;  Location:  ENDOSCOPY    Colonoscopy N/A 2023     Procedure: COLONOSCOPY with cold snare polypectomy and forcep polypectomy;  Surgeon: Ousmane Suarez MD;  Location:  ENDOSCOPY    Colonoscopy & polypectomy   2019    Egd   2019     Duodenitis. Biopsied. EUS for weight loss was negative    Heart surgery        Hernia surgery   2022     Dr Barnes    Laminectomy,>2 sgmt,lumbar   2018     L4-L5 Decomp Discectomy RO[facility] L4-L5    Mitralplasty w cp bypass        [place]: Repair    Repair rotator cuff,chronic Left       torn and had a ruptured bicep    Valve repair        mitral valve            ALL:  Allergies        Allergies   Allergen Reactions    Hydrochlorothiazide RASH and HIVES         Medications Ordered Prior to Encounter             Current Facility-Administered Medications on File Prior to Encounter   Medication Dose Route Frequency Provider Last Rate Last Admin    [COMPLETED] epoetin sylvia (Epogen, Procrit) 54920 UNIT/ML injection 10,000 Units  10,000 Units Intravenous Once Lenka Bond MD   10,000 Units at 24 1219    [] sodium chloride 0.9 % IV  bolus 100 mL  100 mL Intravenous Q30 Min PRN Lenka Bond MD         And    [] albumin human (Albumin) 25% injection 25 g  25 g Intravenous PRN Dialysis Lenka Bond MD        [COMPLETED] HYDROmorphone (Dilaudid) 1 MG/ML injection 0.5 mg  0.5 mg Intravenous Once Lew, Meera, DO   0.5 mg at 24 0907    [COMPLETED] HYDROmorphone (Dilaudid) 1 MG/ML injection 1 mg  1 mg Intravenous Once Lew, Meera, DO   1 mg at 24 0938    [COMPLETED] HYDROmorphone (Dilaudid) 1 MG/ML injection 1 mg  1 mg Intravenous Once Lew, Meera, DO   1 mg at 24 1109    [COMPLETED] furosemide (Lasix) 10 mg/mL injection 40 mg  40 mg Intravenous Once Peksa-Sink, Kerry L, DO   40 mg at 24 2325    [COMPLETED] HYDROmorphone (Dilaudid) 1 MG/ML injection 0.5 mg  0.5 mg Intravenous Once Peksa-Sink, Kerry L, DO   0.5 mg at 24 0014    [COMPLETED] predniSONE (Deltasone) tab 40 mg  40 mg Oral Once Del Cordova MD   40 mg at 24 1858    [COMPLETED] acetaminophen (Tylenol Extra Strength) tab 1,000 mg  1,000 mg Oral Once Camille Jones MD   1,000 mg at 24 1713    [COMPLETED] ampicillin-sulbactam (Unasyn) 3 g in sodium chloride 0.9% 100mL IVPB-ADD  3 g Intravenous Once Camille Jones MD   Stopped at 24 1743    [COMPLETED] ketorolac (Toradol) 15 MG/ML injection 15 mg  15 mg Intravenous Once Mary Lou Feldman DO   15 mg at 24 0555    [COMPLETED] ondansetron (Zofran) 4 MG/2ML injection 4 mg  4 mg Intravenous Once Mary Lou Feldman DO   4 mg at 24 0555    [COMPLETED] metoclopramide (Reglan) 5 mg/mL injection 10 mg  10 mg Intravenous Once Del Cordova MD   10 mg at 24    [COMPLETED] diphenhydrAMINE (Benadryl) 50 mg/mL  injection 50 mg  50 mg Intravenous Once Del Cordova MD   50 mg at 24    [COMPLETED] iopamidol 76% (ISOVUE-370) injection for power injector  100 mL Intravenous ONCE PRN Del Cordova MD   100 mL at 24 07    [COMPLETED] albuterol  (Ventolin HFA) 108 (90 Base) MCG/ACT inhaler 8 puff  8 puff Inhalation Once Del Cordova MD   8 puff at 06/25/24 0835    [COMPLETED] ondansetron (Zofran) 4 MG/2ML injection 4 mg  4 mg Intravenous Once Sharron Leon MD   4 mg at 06/23/24 0858    [COMPLETED] HYDROmorphone (Dilaudid) 1 MG/ML injection 0.5 mg  0.5 mg Intravenous Once Sharron Leon MD   0.5 mg at 06/23/24 0858    [COMPLETED] HYDROmorphone (Dilaudid) 1 MG/ML injection 0.5 mg  0.5 mg Intravenous Once Sharron Leon MD   0.5 mg at 06/23/24 0950    [COMPLETED] HYDROmorphone (Dilaudid) 1 MG/ML injection 0.5 mg  0.5 mg Intravenous Once Sharron Leon MD   0.5 mg at 06/23/24 1104             Current Outpatient Medications on File Prior to Encounter   Medication Sig Dispense Refill    levoFLOXacin 250 MG Oral Tab TAKE 2 TABLETs BY MOUTH one time then take 1 tablet every 48 hours for 30 days        albuterol 108 (90 Base) MCG/ACT Inhalation Aero Soln Inhale 2 puffs into the lungs every 6 (six) hours as needed for Wheezing.        tiotropium 18 MCG Inhalation Cap Inhale 1 capsule (18 mcg total) into the lungs daily.        tamsulosin 0.4 MG Oral Cap Take 1 capsule (0.4 mg total) by mouth daily.        cloNIDine 0.1 MG Oral Tab Take 1 tablet (0.1 mg total) by mouth daily with food.        ARIPiprazole 5 MG Oral Tab Take 3 tablets (15 mg total) by mouth daily.        dicyclomine 10 MG Oral Cap Take 1 capsule (10 mg total) by mouth 3 (three) times daily as needed. 20 capsule 0    isosorbide mononitrate ER 30 MG Oral Tablet 24 Hr Take 1 tablet (30 mg total) by mouth daily. Hold if systolic blood pressure <130        Lisdexamfetamine Dimesylate 60 MG Oral Cap Take 70 mg by mouth every morning.        buPROPion  MG Oral Tablet 24 Hr Take 1 tablet (150 mg total) by mouth daily.        lamoTRIgine (LAMICTAL) 150 MG Oral Tab Take 1 tablet (150 mg total) by mouth daily. 7 tablet 0    FLUoxetine HCl 40 MG Oral Cap Take 1 capsule (40 mg total) by mouth daily.  7 capsule 0    RENVELA 800 MG Oral Tab Take 1 tablet (800 mg total) by mouth 3 (three) times daily with meals.        losartan 100 MG Oral Tab Take 1 tablet (100 mg total) by mouth daily. Hold if systolic blood pressure <130        hydrALAZINE 50 MG Oral Tab Take 1 tablet (50 mg total) by mouth in the morning and 1 tablet (50 mg total) before bedtime. Hold if systolic blood pressure <130. 30 tablet 0    NIFEdipine ER 60 MG Oral Tablet 24 Hr Take 1 tablet (60 mg total) by mouth in the morning and 1 tablet (60 mg total) before bedtime. Hold if systolic blood pressure <130.        carvedilol 25 MG Oral Tab Take 1 tablet (25 mg total) by mouth in the morning and 1 tablet (25 mg total) in the evening. Take with meals. 60 tablet 6    Fenofibrate 134 MG Oral Cap Take 1 capsule by mouth nightly. 90 capsule 1    Fluticasone Propionate 50 MCG/ACT Nasal Suspension SPRAY ONCE INTO EACH NOSTRIL BID PRN 15.8 mL 0    hydrocortisone 25 MG Rectal Suppos Place 1 suppository (25 mg total) rectally 2 (two) times daily. 60 suppository 0               Social History            Tobacco Use    Smoking status: Former       Current packs/day: 0.00       Average packs/day: 1 pack/day for 27.0 years (27.0 ttl pk-yrs)       Types: Cigarettes       Start date: 1995       Quit date: 2022       Years since quittin.5       Passive exposure: Never    Smokeless tobacco: Never   Substance Use Topics    Alcohol use: No         Fam Hx  Family History         Family History   Problem Relation Age of Onset    Hypertension Father      Alcohol and Other Disorders Associated Father      Substance Abuse Father           cocaine    Dementia Father      Cancer Father           lung    Diabetes Mother      Cancer Mother           multiple myeloma    Hypertension Mother      Anxiety Maternal Aunt      Depression Maternal Aunt      Anxiety Maternal Aunt      Depression Maternal Aunt      Bipolar Disorder Maternal Aunt      Diabetes Maternal  Grandmother      Hypertension Maternal Grandmother      Cancer Maternal Grandfather           stomach cancer    Diabetes Maternal Grandfather      Hypertension Maternal Grandfather      Alcohol and Other Disorders Associated Maternal Grandfather      Hypertension Paternal Grandmother      Hypertension Paternal Grandfather      Cancer Sister           uterine and ovarian    Hypertension Sister      Cancer Maternal Uncle           lung    Cancer Paternal Aunt           throat            Review of Systems  Comprehensive ROS reviewed and negative except for what is stated in HPI.       OBJECTIVE:  BP (!) 168/110 (BP Location: Right arm)   Pulse 115   Temp 98.4 °F (36.9 °C) (Oral)   Resp (!) 35   Ht 6' 2\" (1.88 m)   Wt 245 lb (111.1 kg)   SpO2 97%   BMI 31.46 kg/m²   Gen: No acute distress, alert and oriented x3, no focal neurologic deficits  HEENT:  EOMI, PERRLA, OP clear, MMM  Pulm:Diminished at the bases , normal respiratory effort  CV: Tachy, reg, no murmur.  Normal PMI.    Abd: Abdomen soft, nontender, nondistended, no organomegaly, bowel sounds present  MSK: Full range of motion in extremities, no clubbing, no cyanosis  Skin: no rashes or lesions  Neuro:  Grossly intact, no focal deficits        Data Review:    LABS:         Lab Results   Component Value Date     WBC 7.7 09/16/2024     HGB 9.3 09/16/2024     HCT 27.9 09/16/2024     .0 09/16/2024     CREATSERUM 10.05 09/16/2024      09/16/2024      09/16/2024     K 5.0 09/16/2024      09/16/2024     CO2 18.0 09/16/2024      09/16/2024     CA 8.9 09/16/2024     ALB 3.5 09/15/2024     ALKPHO 66 09/15/2024     BILT 0.6 09/15/2024     TP 6.8 09/15/2024     AST 55 09/15/2024     ALT 25 09/15/2024     PTT 27.8 09/15/2024     INR 1.11 09/15/2024     PTP 14.3 09/15/2024      09/15/2024         CXR: image personally reviewed.       Radiology: CT ABDOMEN+PELVIS(CPT=74176)     Result Date: 9/15/2024  PROCEDURE:  CT ABDOMEN+PELVIS  (VHD=26102)  COMPARISON:  [facility], CT, CT ABDOMEN+PELVIS(CPT=74176), 8/30/2024, 9:57 AM.  INDICATIONS:  perla, abd pain  TECHNIQUE:  Unenhanced multislice CT scanning was performed from the dome of the diaphragm to the pubic symphysis.  Dose reduction techniques were used. Dose information is transmitted to the ACR (American College of Radiology) NRDR (National Radiology Data Registry) which includes the Dose Index Registry.  PATIENT STATED HISTORY: (As transcribed by Technologist)     FINDINGS:  LIVER:  No enlargement, atrophy, abnormal density, or significant focal lesion.  BILIARY:  No visible dilatation or calcification.  PANCREAS:  No lesion, fluid collection, ductal dilatation, or atrophy.  SPLEEN:  No enlargement or focal lesion.  KIDNEYS:  No mass, obstruction, or calcification.  ADRENALS:  No mass or enlargement.  AORTA/VASCULAR:    Unremarkable as seen on non-contrast imaging. RETROPERITONEUM:  No mass or adenopathy.  BOWEL/MESENTERY:  There is diffuse diverticulosis of the colon.  There is no CT evidence of diverticulitis. ABDOMINAL WALL:  No mass or hernia.  URINARY BLADDER:  No visible focal wall thickening, lesion, or calculus.  PELVIC NODES:  No adenopathy.  PELVIC ORGANS:  No visible mass.  Pelvic organs appropriate for patient age.  BONES:  There is advanced degenerative disc disease in the lumbar spine. LUNG BASES:  Dependent atelectasis right lower lobe is noted.  There is a small right pleural effusion. OTHER:  Negative.              CONCLUSION:  1. A specific etiology for abdominal pain is not evident from this study. 2. There is diverticulosis of colon without CT evidence of diverticulitis. 3. Small right pleural effusion has decreased since prior study.    LOCATION:  [facility]   Dictated by (CST): Adrian Blevins MD on 9/15/2024 at 10:12 PM     Finalized by (CST): Adrian Blevins MD on 9/15/2024 at 10:15 PM        XR CHEST AP PORTABLE  (CPT=71045)     Result Date: 9/15/2024  PROCEDURE:  XR CHEST AP  PORTABLE  (CPT=71045)  TECHNIQUE:  AP chest radiograph was obtained.  COMPARISON:  [facility] , XR, XR CHEST PA + LAT CHEST (CDJ=49616), 9/02/2024, 7:58 AM.  [facility] , XR, XR CHEST AP PORTABLE  (CPT=71045), 8/30/2024, 11:23 PM.  INDICATIONS:  perla, abd pain  PATIENT STATED HISTORY: (As transcribed by Technologist)  Patient states he has difficulty in breathing and productive cough for 2 days. History of asthma and pneumonia.    FINDINGS:  There is interval improvement in consolidation right lower lobe consistent with improved right lower lobe pneumonia or atelectasis.  The remainder of the lungs is clear.  Heart size is within normal limits.  Mediastinum and elenita are unremarkable.  Right internal jugular tunneled dialysis catheter has been removed.             CONCLUSION:  1. There is interval removal of right tunneled dialysis catheter. 2. There is improvement in opacity right lung base consistent with approved atelectasis or pneumonia.    LOCATION:  [facility]      Dictated by (CST): Adrian Blevins MD on 9/15/2024 at 8:23 PM     Finalized by (CST): Adrian Blevins MD on 9/15/2024 at 8:24 PM        XR CHEST PA + LAT CHEST (CPT=71046)     Result Date: 9/2/2024  PROCEDURE:  XR CHEST PA + LAT CHEST (CPT=71046)  INDICATIONS:  hypoxia  COMPARISON:  [facility], CT, CT ABDOMEN+PELVIS(CPT=74176), 8/30/2024, 9:57 AM.  [facility] , XR, XR CHEST AP PORTABLE  (CPT=71045), 8/30/2024, 11:23 PM.  [facility], XR, XR CHEST AP PORTABLE  (CPT=71045), 8/30/2024, 8:57 AM.  TECHNIQUE:  PA and lateral chest radiographs were obtained.  PATIENT STATED HISTORY: (As transcribed by Technologist)  Patient offered no additional history at this time.                CONCLUSION:  The patient is status post mitral valve replacement.  The heart size is within normal limits without CHF.  A right central venous catheter/Rito catheter is noted with tip in the distal SVC. There is decrease in the right lower lobe and right perihilar consolidation with slight decrease  in the right effusion.  Stable slight thickening of the horizontal fissure.  The left lung is clear.  No pneumothorax.  Bolus changes are noted in the right lung base laterally/right middle lobe.   LOCATION:  [facility]   Dictated by (CST): Albert Faith MD on 9/02/2024 at 8:09 AM     Finalized by (CST): Albert Faith MD on 9/02/2024 at 8:11 AM        XR CHEST AP PORTABLE  (CPT=71045)     Result Date: 8/30/2024  PROCEDURE:  XR CHEST AP PORTABLE  (CPT=71045)  TECHNIQUE:  AP chest radiograph was obtained.  COMPARISON:  [facility], XR, XR CHEST AP PORTABLE  (CPT=71045), 8/30/2024, 8:57 AM.  INDICATIONS:  rectal bleeding and chest pain  PATIENT STATED HISTORY: (As transcribed by Technologist)  rectal bleeding and chest pain.    FINDINGS:  Tunneled right internal jugular hemodialysis catheter remains in place.  Stable cardiomegaly with valve prosthesis.  Mild pulmonary vascular congestion and perihilar interstitial edema.  Increased right pleural effusion and right basilar consolidation/atelectasis.             CONCLUSION:  1. Increased right pleural effusion and right basilar atelectasis/consolidation. 2. Stable vascular congestion interstitial edema suggestive of CHF or fluid overload.  LOCATION:  [facility]      Dictated by (CST): Ricki Zaragoza MD on 8/30/2024 at 11:43 PM     Finalized by (CST): Ricki Zaragoza MD on 8/30/2024 at 11:44 PM        CT ABDOMEN+PELVIS(CPT=74176)     Result Date: 8/30/2024  PROCEDURE:  CT ABDOMEN+PELVIS (CPT=74176)  COMPARISON:  [facility], CT, CT ABDOMEN+PELVIS(CONTRAST ONLY)(CPT=74177), 6/25/2024, 7:21 AM.  INDICATIONS:  rectal bleeding, SOB  TECHNIQUE:  Unenhanced multislice CT scanning was performed from the dome of the diaphragm to the pubic symphysis.  Dose reduction techniques were used. Dose information is transmitted to the ACR (American College of Radiology) NRDR (National Radiology Data Registry) which includes the Dose Index Registry.  PATIENT STATED HISTORY: (As transcribed by  Technologist)  Patient has rectal bleeding and shortness of breath.    FINDINGS:  LIVER:  No enlargement, atrophy, abnormal density, or significant focal lesion.  BILIARY:  No visible dilatation or calcification.  PANCREAS:  No lesion, fluid collection, ductal dilatation, or atrophy.  SPLEEN:  No enlargement or focal lesion.  KIDNEYS:  No mass, obstruction, or calcification.  ADRENALS:  No mass or enlargement.  AORTA/VASCULAR:    Unremarkable as seen on non-contrast imaging. RETROPERITONEUM:  No mass or adenopathy.  BOWEL/MESENTERY:  No visible mass, obstruction, or bowel wall thickening.  Appendix is normal. ABDOMINAL WALL:  Fat containing bilateral inguinal hernias are noted.  URINARY BLADDER:  No visible focal wall thickening, lesion, or calculus.  PELVIC NODES:  No adenopathy.  PELVIC ORGANS:  No visible mass.  Pelvic organs appropriate for patient age.  BONES:  No bony lesion or fracture.  LUNG BASES:  Small right effusion has increased in size compared to the prior exam.  Loculated effusion along the anterior aspect of the right hemithorax appears progressed from the prior exam.  Right basilar atelectatic changes noted.  Mildly prominent cardiophrenic lymph nodes are noted which are nonspecific. OTHER:  Negative.              CONCLUSION:  1. Small right pleural effusion is increased in size compared to the prior examination.  The right effusion appears partially loculated.  Right basilar atelectatic changes present.   LOCATION:  [facility]   Dictated by (CST): Duy Fitzpatrick MD on 8/30/2024 at 10:55 AM     Finalized by (CST): Duy Fitzpatrick MD on 8/30/2024 at 11:01 AM        XR CHEST AP PORTABLE  (CPT=71045)     Result Date: 8/30/2024  PROCEDURE:  XR CHEST AP PORTABLE  (CPT=71045)  TECHNIQUE:  AP chest radiograph was obtained.  COMPARISON:  [facility], XR, XR CHEST PA + LAT CHEST (CPT=71046), 8/11/2024, 5:57 PM.  [facility], XR, XR CHEST AP PORTABLE  (CPT=71045), 8/08/2024, 5:00 PM.  INDICATIONS:  rectal bleeding,  SOB  PATIENT STATED HISTORY: (As transcribed by Technologist)  Patient had onset of right sided abdominal pain with bloating/swelling to the abdomen, on 9/29/24.  He also complains of shortness of breath.  He denies any nausea, vomiting or specific chest pain.  Patient has a history of mitrovalve surgery in 2022.    FINDINGS:  Stable right-sided dialysis catheter.  Cardiomegaly.  Cardiac valve prosthesis.  Interstitial opacities bilaterally.  Small right-sided pleural effusion right basilar atelectasis/infiltrates.  No pneumothorax.             CONCLUSION:  1. Cardiomegaly with interstitial opacities likely representing edema. 2. Small right-sided pleural effusion with right basilar atelectasis/infiltrates.    LOCATION:  [facility]      Dictated by (CST): Quinn Bernal MD on 8/30/2024 at 9:16 AM     Finalized by (CST): Quinn Bernal MD on 8/30/2024 at 9:22 AM           Assessment/Plan:      44 yr old male with PMH sig for ESRD on HD (M/W/F), bipolar, depression, DM 2, HTN, DL, CAD, COPD, HFpEF, and chronic abdominal pain who presented to the ED for evaluation of shortness of breath.      # Stage 5 CKD with fluid overload   # Hyperkalemia   - suspect due to missed HD  - renal c/s  - HD per renal  - monitor K     # Acute on chronic diastolic HF  # Elevated troponin   # CAD  - suspect demand ischemia due to missed HD  - trop down trending   - last TTE 3/10/24 with intact LVEF  - cards c/s     # Anemia of chronic disease   # Hx of GI bleeding  - pt following with GI as outpt   - plan outpt capsule study   - monitor hgb     # Chronic abd pain  # Opioid dependence   - follows with pain clinic as outpt   - resume home pain medications   - minimize IV narcotics      # Essential HTN  - BP elevated on admit  - resume home BP meds     # HLD  - cont statin     # COPD  - no evidence of acute exacerbation   - resume home inhalers      # Major depression, recurrent   # Bipolar d/o  - stable  - resume home medications      DVT  prophy - hep subcutaneous   Dispo: intp care.  POC d/w pt who agrees.      Outpatient records or previous hospital records reviewed.   [facility] hospitalist to continue to follow patient while in house  A total of 76 minutes taken with patient and coordinating care.  Greater than 50% face to face encounter.       Phong Freedman DO  [facility] Hospitalist                 9/16/2024 Cardiology Consult  Reason for consult: Troponin     Primary cardiologist: Devan Rankin MD      History of Present Illness:  Godwin Fonseca is a 44 year old male with ESRD on HD, H/o DVT/PE 1/23 s/p Eliquis x 6 mo, \"NSTEMI\" 4/23 with cath showing no sig CAD, h/o severe MR s/p MVR 1994, redo complex robotically assisted MVR ([facility] 2022), chronic HFpEF, h/o TIA, HTN, bipolar disease, who presented to [facility] on 9/15/2024 with SOB. Missed HD last Friday due to abdominal pain, vomiting and diarrhea. Since then has been SOB. He is at \"dry wt\" ~ 245-250 lbs. Denies LE edema. No CP. No fevers. BP has been high 180-190s. Last week had lower abd pain and dx'd with prostatitis, was taking levaquin. We are consulted for troponin ~ 500s. Again denies CP. Had cath 4/23 for + troponin that showed no sig CAD.      Medications:  Current Hospital Medications          Current Facility-Administered Medications   Medication Dose Route Frequency    ARIPiprazole (Abilify) tab 15 mg  15 mg Oral Daily    albuterol (Ventolin HFA) 108 (90 Base) MCG/ACT inhaler 2 puff  2 puff Inhalation Q6H PRN    umeclidinium bromide (Incruse Ellipta) 62.5 MCG/ACT inhaler 1 puff  1 puff Inhalation Daily    hydrALAzine (Apresoline) 20 mg/mL injection 10 mg  10 mg Intravenous Q6H PRN    amphetamine-dextroamphetamine (Adderall) tab 15 mg  15 mg Oral BID AC    HYDROmorphone (Dilaudid) 1 MG/ML injection 0.5 mg  0.5 mg Intravenous Q6H PRN    sodium chloride 0.9 % IV bolus 100 mL  100 mL Intravenous Q30 Min PRN     And    albumin human (Albumin) 25% injection 25 g  25 g  Intravenous PRN Dialysis    heparin (Porcine) 1000 UNIT/ML injection 1,500 Units  1.5 mL Intracatheter PRN Dialysis    cloNIDine (Catapres) tab 0.1 mg  0.1 mg Oral Daily with food    buPROPion ER (Wellbutrin XL) 24 hr tab 150 mg  150 mg Oral Daily    carvedilol (Coreg) tab 25 mg  25 mg Oral BID with meals    dicyclomine (Bentyl) cap 10 mg  10 mg Oral TID PRN    fenofibrate micronized (Lofibra) cap 134 mg  134 mg Oral Nightly    FLUoxetine (PROzac) cap 40 mg  40 mg Oral Daily    fluticasone propionate (Flonase) 50 MCG/ACT nasal suspension 1 spray  1 spray Each Nare BID    hydrALAZINE (Apresoline) tab 50 mg  50 mg Oral BID    isosorbide mononitrate ER (Imdur) 24 hr tab 30 mg  30 mg Oral Daily    lamoTRIgine (LaMICtal) tab 150 mg  150 mg Oral Daily    levoFLOXacin (Levaquin) tab 250 mg  250 mg Oral Q48HR @ 0700    losartan (Cozaar) tab 100 mg  100 mg Oral Daily    NIFEdipine ER (Procardia-XL) 24 hr tab 60 mg  60 mg Oral BID    sevelamer carbonate (Renvela) tab 800 mg  800 mg Oral TID CC    tamsulosin (Flomax) cap 0.4 mg  0.4 mg Oral Daily    heparin (Porcine) 5000 UNIT/ML injection 5,000 Units  5,000 Units Subcutaneous Q8H MARTHA    acetaminophen (Tylenol Extra Strength) tab 500 mg  500 mg Oral Q4H PRN    acetaminophen (Tylenol) tab 650 mg  650 mg Oral Q4H PRN     Or    HYDROcodone-acetaminophen (Norco) 5-325 MG per tab 1 tablet  1 tablet Oral Q4H PRN     Or    HYDROcodone-acetaminophen (Norco) 5-325 MG per tab 2 tablet  2 tablet Oral Q4H PRN    polyethylene glycol (PEG 3350) (Miralax) 17 g oral packet 17 g  17 g Oral Daily PRN    sennosides (Senokot) tab 17.2 mg  17.2 mg Oral Nightly PRN    bisacodyl (Dulcolax) 10 MG rectal suppository 10 mg  10 mg Rectal Daily PRN    ondansetron (Zofran) 4 MG/2ML injection 4 mg  4 mg Intravenous Q6H PRN    prochlorperazine (Compazine) 10 MG/2ML injection 5 mg  5 mg Intravenous Q8H PRN            Past Medical History       Past Medical History:    Asthma (HCC)    Attention deficit  hyperactivity disorder (ADHD)    Back problem    Bipolar 1 disorder (HCC)    CKD (chronic kidney disease) stage 3, GFR 30-59 ml/min (HCC)     Dr Meeks    Congestive heart disease (HCC)    COPD (chronic obstructive pulmonary disease) ([facility])    Coronary atherosclerosis    Deep vein thrombosis ([facility])     at age 17 R/T cast    Depression    Diabetes (HCC)    Essential hypertension     3/21 echo: severe concentric LVH with normal EF and no MR or pHTN    Extrinsic asthma, unspecified    Heart attack (HCC)     2016- angiogram- no intervention    Heart valve disease     mitral valve repair in 1994/    High blood pressure    High cholesterol    History of blood transfusion    History of mitral valve repair    Hyperlipidemia    Low back pain     tight and stiff after sweeping and mopping    LVH (left ventricular hypertrophy)    Migraines    Mixed hyperlipidemia      HDL 38 LDL 97 VLDL 57     Monoclonal gammopathy     IgG kappa     MVP (mitral valve prolapse)     Repair 1994 at [place]; echoes as recently as 3/21 show mild or trivial MR and no stenosis    Neuropathy    Osteoarthritis     hip ,knees    Pneumonia due to organism    Pulmonary embolism (HCC)    Renal disorder    Stroke (HCC)    TIA (transient ischemic attack)     Initial history of left-sided weakness and slurred speech. (+) cocaine. MRI of the brain, CT angiogram of the head and neck, and 2D echo are all unremarkable.     TMJ (dislocation of temporomandibular joint)    Troponin level elevated     Trop 60 60 47 with TIA and no CP: Lexiscan negative with EF 51            Past Surgical History         Past Surgical History:   Procedure Laterality Date    Av fistula revision, open Left      Colonoscopy N/A 03/26/2023     Procedure: COLONOSCOPY;  Surgeon: Heath Vu MD;  Location:  ENDOSCOPY    Colonoscopy N/A 12/30/2023     Procedure: COLONOSCOPY with cold snare polypectomy and forcep polypectomy;  Surgeon: Ousmane Suarez MD;  Location:   ENDOSCOPY    Colonoscopy & polypectomy   2019    [facility]   2019     Duodenitis. Biopsied. EUS for weight loss was negative    Heart surgery        Hernia surgery   2022     Dr Barnes    Laminectomy,>2 sgmt,lumbar   2018     L4-L5 Decomp Discectomy [facility] L4-L5    Mitralplasty w cp bypass        [place]: Repair    Repair rotator cuff,chronic Left       torn and had a ruptured bicep    Valve repair        mitral valve            Family History  family history includes Alcohol and Other Disorders Associated in his father and maternal grandfather; Anxiety in his maternal aunt and maternal aunt; Bipolar Disorder in his maternal aunt; Cancer in his father, maternal grandfather, maternal uncle, mother, paternal aunt, and sister; Dementia in his father; Depression in his maternal aunt and maternal aunt; Diabetes in his maternal grandfather, maternal grandmother, and mother; Hypertension in his father, maternal grandfather, maternal grandmother, mother, paternal grandfather, paternal grandmother, and sister; Substance Abuse in his father.     Social History   reports that he quit smoking about 2 years ago. His smoking use included cigarettes. He started smoking about 29 years ago. He has a 27 pack-year smoking history. He has never been exposed to tobacco smoke. He has never used smokeless tobacco. He reports that he does not drink alcohol and does not use drugs.      Allergies  Allergies        Allergies   Allergen Reactions    Hydrochlorothiazide RASH and HIVES            Review of Systems:  As per HPI, otherwise 10 point ROS is negative in detail.     Physical Exam:  Blood pressure (!) 169/113, pulse 107, temperature 98 °F (36.7 °C), temperature source Oral, resp. rate 17, height 74\", weight 245 lb (111.1 kg), SpO2 95%.  Temp (24hrs), Av.1 °F (36.7 °C), Min:97.3 °F (36.3 °C), Max:98.8 °F (37.1 °C)         Wt Readings from Last 3 Encounters:   09/15/24 245 lb (111.1 kg)   24 248 lb 8 oz (112.7 kg)    08/30/24 250 lb (113.4 kg)         General: Awake and alert; in no acute distress  HEENT: Extraocular movements are intact; sclerae are anicteric; scalp is atraumatic  Neck: Supple; no JVD; no carotid bruits  Cardiac: Regular rate and regular rhythm; normal S1 and S2, 2/6 HSM apex, no rubs, or gallops are appreciated  Lungs: Clear to auscultation bilaterally; no accessory muscle use is noted, no wheezes, rhonci or rales  Abdomen: Soft, non-distended, non-tender; bowel sounds are normoactive  Extremities: Warm, no edema, clubbing or cyanosis; moves all 4 extremities normally, distal pulses intact and equal  Psychiatric: Normal mood and affect; answers questions appropriately  Dermatologic: No rashes; normal skin turgor     Diagnostic testing:     Labs:         Lab Results   Component Value Date     PT 13.9 12/14/2012     INR 1.11 09/15/2024     INR 1.05 08/30/2024            Lab Results   Component Value Date     LDL 71 09/15/2024     HDL 44 09/15/2024     TRIG 92 09/15/2024     VLDL 14 09/15/2024            Lab Results   Component Value Date     WBC 7.7 09/16/2024     HGB 9.3 09/16/2024     HCT 27.9 09/16/2024     .0 09/16/2024     CREATSERUM 10.05 09/16/2024      09/16/2024      09/16/2024     K 5.0 09/16/2024      09/16/2024     CO2 18.0 09/16/2024      09/16/2024     CA 8.9 09/16/2024     ALB 3.5 09/15/2024     ALKPHO 66 09/15/2024     BILT 0.6 09/15/2024     TP 6.8 09/15/2024     AST 55 09/15/2024     ALT 25 09/15/2024     PTT 27.8 09/15/2024     INR 1.11 09/15/2024     PTP 14.3 09/15/2024      09/15/2024         Cardiac diagnostics:     CXR 9/15/24  There is interval improvement in consolidation right lower lobe consistent with improved right lower lobe pneumonia or atelectasis.  The remainder of the lungs is clear.  Heart size is within normal limits.  Mediastinum and elenita are   unremarkable.  Right internal jugular tunneled dialysis catheter has been removed.      EKG  9/15/2024:   Normal sinus rhythm  Minimal voltage criteria for LVH, may be normal variant ( Sokolow-Rojas )  Borderline ECG      Echo 3/10/24:  1. Left ventricle: The cavity size was normal. There is moderarte concentric      left ventricular hypertrophy Systolic function was normal. The estimated      ejection fraction was 55-60%, by visual assessment. Wall motion is      normal; there are no regional wall motion abnormalities.   2. Mitral valve: There was mild to moderate regurgitation. There is history      of annular ring repair The mean diastolic gradient was 2mm Hg.      Impression:  44 year old male presenting with SOB after missing HD session (last 5 days PTA)  Mild troponin elevation - chronic, does not reflect ACS  \"NSTEMI\" 4/23 with cath showing no sig CAD  H/o severe MR s/p MVR 1994, redo complex robotically assisted MVR ([facility] 2022)  Chronic HFpEF  ESRD on HD  H/o DVT/PE 1/23 s/p Eliquis x 6 mo  Primary hyperaldosteronism  HTN - not controlled  H/o TIA  Bipolar disease  H/o cocaine use  H/o RLL PNA (8/8/24) treated with Abx, prednisone  Melena - C-scope, EGD no source, planned for capsule endoscopy  Chronic anemia     Recommendations:  HD per renal for volume and BP management  Continue usual home antihypertensive regimen for now with IV prn   Not on ASA due to recent GIB     Thank you for allowing our practice to participate in the care of your patient. Please do not hesitate to contact me if you have any questions.     Micheal Campos MD  Interventional Cardiology  [facility]       9/17/2024 Hospitalist Progress Note    Follow Up:  The primary encounter diagnosis was Stage 5 chronic kidney disease on chronic dialysis (HCC). Diagnoses of Abdominal pain, acute, Anemia, unspecified type, Acute on chronic congestive heart failure, unspecified heart failure type (HCC), Elevated troponin, and Hyperkalemia were also pertinent to this visit.        Subjective:  Patient seen and examined.   States he is  feeling a little better but still weak.  Pt still c/o lower abd pain.  No F/C, N/V.            Objective:  Review of Systems:   10 point ROS completed and was negative, except for pertinent positive and negatives stated in subjective.     Vital signs:  Temp:  [97.6 °F (36.4 °C)-98 °F (36.7 °C)] 98 °F (36.7 °C)  Pulse:  [] 100  Resp:  [16-18] 18  BP: (105-120)/(58-80) 120/74  SpO2:  [91 %-94 %] 93 %     Physical Exam:    General: No acute distress.   HEENT:  EOMI, PERRLA, OP clear  Respiratory: Clear to auscultation bilaterally. No wheezes. No rhonchi.  Cardiovascular: S1, S2. Regular rate and rhythm. No murmurs.  Abdomen: Soft, nontender, nondistended.  Positive bowel sounds. No rebound or guarding.  Extremities: No edema.  Neuro:  Grossly non focal, no motor deficits.          Diagnostic Data:    Labs:        Recent Labs   Lab 09/15/24  1939 09/16/24  0645 09/17/24  0539   WBC 7.9 7.7 5.6   HGB 9.2* 9.3* 9.5*   MCV 95.5 96.9 94.7   .0 211.0 228.0   INR 1.11  --   --                Recent Labs   Lab 09/15/24  1939 09/16/24  0645 09/17/24  0539   GLU 98 101* 98   * 102* 51*   CREATSERUM 11.40* 10.05* 7.24*   CA 8.5 8.9 8.4*   ALB 3.5  --   --     140 139   K 5.6* 5.0 4.4    109 103   CO2 19.0* 18.0* 23.0   ALKPHO 66  --   --    AST 55*  --   --    ALT 25  --   --    BILT 0.6  --   --    TP 6.8  --   --          Estimated Creatinine Clearance: 14.8 mL/min (A) (based on SCr of 7.24 mg/dL (H)).         Recent Labs   Lab 09/15/24  1939   PTP 14.3   INR 1.11            COVID-19 Lab Results     COVID-19        Lab Results   Component Value Date     COVID19 Not Detected 09/15/2024     COVID19 Not Detected 08/11/2024     COVID19 Not Detected 05/15/2024         Pro-Calcitonin  No results for input(s): \"PCT\" in the last 168 hours.     Cardiac      Recent Labs   Lab 09/15/24  1939   PBNP 28,618*         Creatinine Kinase  No results for input(s): \"CK\" in the last 168 hours.     Inflammatory  Markers  No results for input(s): \"CRP\", \"HARJEET\", \"LDH\", \"DDIMER\" in the last 168 hours.     Imaging: Imaging data reviewed in Epic.     Medications:   Scheduled Medications    ARIPiprazole  15 mg Oral Daily    umeclidinium bromide  1 puff Inhalation Daily    amphetamine-dextroamphetamine  15 mg Oral BID AC    cloNIDine  0.1 mg Oral BID    buPROPion ER  150 mg Oral Daily    carvedilol  25 mg Oral BID with meals    fenofibrate micronized  134 mg Oral Nightly    FLUoxetine HCl  40 mg Oral Daily    fluticasone propionate  1 spray Each Nare BID    hydrALAZINE  50 mg Oral BID    isosorbide mononitrate ER  30 mg Oral Daily    lamoTRIgine  150 mg Oral Daily    levoFLOXacin  250 mg Oral Q48HR @ 0700    losartan  100 mg Oral Daily    NIFEdipine ER  60 mg Oral BID    sevelamer carbonate  800 mg Oral TID CC    tamsulosin  0.4 mg Oral Daily    heparin  5,000 Units Subcutaneous Q8H MARTHA                  Assessment & Plan:  44 yr old male with PMH sig for ESRD on HD (M/W/F), bipolar, depression, DM 2, HTN, DL, CAD, COPD, HFpEF, and chronic abdominal pain who presented to the ED for evaluation of shortness of breath.      # Stage 5 CKD with fluid overload   # Hyperkalemia   - suspect due to missed HD  - renal c/s  - HD per renal  - monitor K     # Acute on chronic diastolic HF  # Elevated troponin   # CAD  - suspect demand ischemia due to missed HD  - trop down trending   - last TTE 3/10/24 with intact LVEF  - cards c/s appreciated      # Anemia of chronic disease   # Hx of GI bleeding  - pt following with GI as outpt   - plan outpt capsule study   - monitor hgb     # Chronic abd pain  # Opioid dependence   - follows with pain clinic as outpt   - resume home pain medications   - minimize IV narcotics      # Prostatitis  - cont po levaquin      # Essential HTN  - BP elevated on admit  - resume home BP meds     # HLD  - cont statin     # COPD  - no evidence of acute exacerbation   - resume home inhalers      # Major depression,  recurrent   # Bipolar d/o  - stable  - resume home medications      Plan of care: inpt care     Plan of care discussed with patient or family at bedside.     Quality:  DVT Prophylaxis: hep subq  CODE status: FULL  Phong Freedman,

## 2024-09-17 NOTE — PLAN OF CARE
Assumed care 1930  Patient alert, oriented x4  Dilaudid and norco for abdominal pain  BP improving

## 2024-09-18 ENCOUNTER — APPOINTMENT (OUTPATIENT)
Dept: ULTRASOUND IMAGING | Facility: HOSPITAL | Age: 44
End: 2024-09-18
Attending: INTERNAL MEDICINE
Payer: MEDICARE

## 2024-09-18 PROCEDURE — 90935 HEMODIALYSIS ONE EVALUATION: CPT | Performed by: STUDENT IN AN ORGANIZED HEALTH CARE EDUCATION/TRAINING PROGRAM

## 2024-09-18 PROCEDURE — 93990 DOPPLER FLOW TESTING: CPT | Performed by: INTERNAL MEDICINE

## 2024-09-18 RX ORDER — CLONIDINE HYDROCHLORIDE 0.1 MG/1
0.1 TABLET ORAL 2 TIMES DAILY
Qty: 60 TABLET | Refills: 0 | Status: ON HOLD | OUTPATIENT
Start: 2024-09-18

## 2024-09-18 NOTE — PROGRESS NOTES
[facility] Nephrology  Inpatient Follow-up    Godwin Fonseca Patient Status:  Inpatient    1980 MRN [id number]   Formerly [facility] 7NE-A Attending Phong Freedman, DO   Hosp Day # 3 PCP Prieto Novak MD       SUBJECTIVE:     Unable to perform HD today due to issues with access.     MEDICATIONS:     Current Facility-Administered Medications   Medication Dose Route Frequency    ARIPiprazole (Abilify) tab 15 mg  15 mg Oral Daily    albuterol (Ventolin HFA) 108 (90 Base) MCG/ACT inhaler 2 puff  2 puff Inhalation Q6H PRN    umeclidinium bromide (Incruse Ellipta) 62.5 MCG/ACT inhaler 1 puff  1 puff Inhalation Daily    hydrALAzine (Apresoline) 20 mg/mL injection 10 mg  10 mg Intravenous Q6H PRN    amphetamine-dextroamphetamine (Adderall) tab 15 mg  15 mg Oral BID AC    HYDROmorphone (Dilaudid) 1 MG/ML injection 0.5 mg  0.5 mg Intravenous Q6H PRN    heparin (Porcine) 1000 UNIT/ML injection 1,500 Units  1.5 mL Intracatheter PRN Dialysis    cloNIDine (Catapres) tab 0.1 mg  0.1 mg Oral BID    buPROPion ER (Wellbutrin XL) 24 hr tab 150 mg  150 mg Oral Daily    carvedilol (Coreg) tab 25 mg  25 mg Oral BID with meals    dicyclomine (Bentyl) cap 10 mg  10 mg Oral TID PRN    fenofibrate micronized (Lofibra) cap 134 mg  134 mg Oral Nightly    FLUoxetine (PROzac) cap 40 mg  40 mg Oral Daily    fluticasone propionate (Flonase) 50 MCG/ACT nasal suspension 1 spray  1 spray Each Nare BID    hydrALAZINE (Apresoline) tab 50 mg  50 mg Oral BID    isosorbide mononitrate ER (Imdur) 24 hr tab 30 mg  30 mg Oral Daily    lamoTRIgine (LaMICtal) tab 150 mg  150 mg Oral Daily    levoFLOXacin (Levaquin) tab 250 mg  250 mg Oral Q48HR @ 0700    losartan (Cozaar) tab 100 mg  100 mg Oral Daily    NIFEdipine ER (Procardia-XL) 24 hr tab 60 mg  60 mg Oral BID    sevelamer carbonate (Renvela) tab 800 mg  800 mg Oral TID CC    tamsulosin (Flomax) cap 0.4 mg  0.4 mg Oral Daily    heparin (Porcine) 5000 UNIT/ML injection 5,000  Units  5,000 Units Subcutaneous Q8H [facility]    acetaminophen (Tylenol Extra Strength) tab 500 mg  500 mg Oral Q4H PRN    acetaminophen (Tylenol) tab 650 mg  650 mg Oral Q4H PRN    Or    HYDROcodone-acetaminophen (Norco) 5-325 MG per tab 1 tablet  1 tablet Oral Q4H PRN    Or    HYDROcodone-acetaminophen (Norco) 5-325 MG per tab 2 tablet  2 tablet Oral Q4H PRN    polyethylene glycol (PEG 3350) (Miralax) 17 g oral packet 17 g  17 g Oral Daily PRN    sennosides (Senokot) tab 17.2 mg  17.2 mg Oral Nightly PRN    bisacodyl (Dulcolax) 10 MG rectal suppository 10 mg  10 mg Rectal Daily PRN    ondansetron (Zofran) 4 MG/2ML injection 4 mg  4 mg Intravenous Q6H PRN    prochlorperazine (Compazine) 10 MG/2ML injection 5 mg  5 mg Intravenous Q8H PRN       PHYSICAL EXAM:     Vital Signs: /88 (BP Location: Right arm)   Pulse 98   Temp 98 °F (36.7 °C) (Oral)   Resp 14   Ht 6' 2\" (1.88 m)   Wt 245 lb (111.1 kg)   SpO2 97%   BMI 31.46 kg/m²   Temp (24hrs), Av.2 °F (36.8 °C), Min:97.4 °F (36.3 °C), Max:98.5 °F (36.9 °C)       Intake/Output Summary (Last 24 hours) at 2024 1230  Last data filed at 2024 1615  Gross per 24 hour   Intake --   Output 3000 ml   Net -3000 ml     Wt Readings from Last 3 Encounters:   24 245 lb (111.1 kg)   24 248 lb 8 oz (112.7 kg)   24 250 lb (113.4 kg)       General: no acute distress  HEENT: normocephalic, atraumatic  CV: RRR  Respiratory: no distress  Abdomen: soft, non-tender  Extremities: no edema bilaterally  Skin: warm, dry  Neuro: awake, alert     LABORATORY DATA:     Lab Results   Component Value Date    GLU 98 2024    BUN 51 (H) 2024    BUNCREA 13.0 2022    CREATSERUM 7.24 (H) 2024    ANIONGAP 13 2024    GFR 59 (L) 2018    GFRNAA 30 (L) 2022    GFRAA 35 (L) 2022    CA 8.4 (L) 2024    OSMOCALC 302 (H) 2024    ALKPHO 66 09/15/2024    AST 55 (H) 09/15/2024    ALT 25 09/15/2024    BILT 0.6 09/15/2024     TP 6.8 09/15/2024    ALB 3.5 09/15/2024    GLOBULIN 3.3 09/15/2024     09/17/2024    K 4.4 09/17/2024     09/17/2024    CO2 23.0 09/17/2024     Lab Results   Component Value Date    WBC 5.6 09/17/2024    RBC 3.02 (L) 09/17/2024    HGB 9.5 (L) 09/17/2024    HCT 28.6 (L) 09/17/2024    .0 09/17/2024    MPV 11.5 12/14/2012    MCV 94.7 09/17/2024    MCH 31.5 09/17/2024    MCHC 33.2 09/17/2024    RDW 15.1 09/17/2024    NEPRELIM 3.52 09/17/2024    NEPERCENT 63.4 09/17/2024    LYPERCENT 20.4 09/17/2024    MOPERCENT 11.5 09/17/2024    EOPERCENT 3.2 09/17/2024    BAPERCENT 1.3 09/17/2024    NE 3.52 09/17/2024    LYMABS 1.13 09/17/2024    MOABSO 0.64 09/17/2024    EOABSO 0.18 09/17/2024    BAABSO 0.07 09/17/2024     Lab Results   Component Value Date    MALBP 167.00 05/24/2023    CREUR 142.00 05/24/2023    CREUR 142.00 05/24/2023     Lab Results   Component Value Date    COLORUR Colorless (A) 09/16/2024    CLARITY Clear 09/16/2024    SPECGRAVITY 1.012 09/16/2024    GLUUR Normal 09/16/2024    BILUR Negative 09/16/2024    KETUR Negative 09/16/2024    BLOODURINE 3+ (A) 09/16/2024    PHURINE 6.0 09/16/2024    PROUR 50 (A) 09/16/2024    UROBILINOGEN Normal 09/16/2024    NITRITE Negative 09/16/2024    LEUUR Negative 09/16/2024    WBCUR 1-5 09/16/2024    RBCUR 0-2 09/16/2024    EPIUR Few (A) 09/16/2024    BACUR None Seen 09/16/2024    CAOXUR Occasional (A) 04/20/2018    HYLUR Present (A) 05/14/2019       IMAGING:     Reviewed    ASSESSMENT:      # ESRD on HD  -MWF schedule outpatient  -[facility]  -L AVF      # HTN/Volume Status     # Hyperkalemia     # Metabolic acidosis     # Anemia     # Secondary Hyperparathyroidism      PLAN:      -Issues with L AVF - feel it is not mature yet (placed about 6-8 weeks ago??), may also have small hematoma. Likely needs rest. Would have vascular surgery evaluate and if needs rest, then will plan for tunnel catheter placement.  -HD per MWF schedule - will reschedule  today HD to tomorrow pending vascular surgery recommendations  -UF with HD as tolerated  -Continue home BP medications - change clonidine to 0.1mg BID to avoid rebound  -Defer OWEN in setting of high BPs  -Continue sevelamer, will check phosphorus levels  -Avoid nephrotoxins and renally dose medications for creatinine clearance  -Monitor intake and output daily  -Daily weights        We will continue to follow    Thank you for allowing us to participate in the care of this patient.       DO Catrachita Hallmany [facility] - Nephrology

## 2024-09-18 NOTE — PROGRESS NOTES
Vascular surgery note    Consult received.  Spoke with hospitalist.  Ultrasound hemodialysis duplex ordered.  Will await results.  If needs imminent dialysis will need temporary catheter placement.  Will await the results of the ultrasound.

## 2024-09-18 NOTE — PLAN OF CARE
Assumed care at 1930  Alert and oriented x4   NSR/ST on tele and on RA  Pain in abd, dilaudid & norco w/ zofran given  Called Fred for HD  Plan for US of LLE & HD   Call light w/in reach

## 2024-09-18 NOTE — PROGRESS NOTES
[facility]   part of [facility] Hospitalist Progress Note     Godwin Fonseca Patient Status:  Inpatient    1980 MRN [id number]   Location [facility] 7NE-A Attending Phong Freedman, DO   Hosp Day # 3 PCP Prieto Novak MD     Follow Up:  The primary encounter diagnosis was Stage 5 chronic kidney disease on chronic dialysis (HCC). Diagnoses of Abdominal pain, acute, Anemia, unspecified type, Acute on chronic congestive heart failure, unspecified heart failure type (HCC), Elevated troponin, and Hyperkalemia were also pertinent to this visit.    Subjective:     Patient seen and examined.   Having issues with HD access .  Still c/o lower abd pain and L calf pain while walking.  No F/C, N/V.     Objective:    Review of Systems:   10 point ROS completed and was negative, except for pertinent positive and negatives stated in subjective.    Vital signs:  Temp:  [97.4 °F (36.3 °C)-98.5 °F (36.9 °C)] 98 °F (36.7 °C)  Pulse:  [] 98  Resp:  [14-20] 14  BP: ()/(63-88) 123/88  SpO2:  [90 %-97 %] 97 %    Physical Exam:    Gen: No acute distress, alert and oriented x3, no focal neurologic deficits  HEENT:  EOMI, PERRLA, OP clear, MMM  Pulm:Diminished at the bases , normal respiratory effort  CV: Tachy, reg, no murmur.  Normal PMI.    Abd: Abdomen soft, nontender, nondistended, no organomegaly, bowel sounds present  MSK: Full range of motion in extremities, no clubbing, no cyanosis  Skin: no rashes or lesions  Neuro:  Grossly intact, no focal deficits      Diagnostic Data:    Labs:  Recent Labs   Lab 09/15/24  1939 09/16/24  0645 24  0539   WBC 7.9 7.7 5.6   HGB 9.2* 9.3* 9.5*   MCV 95.5 96.9 94.7   .0 211.0 228.0   INR 1.11  --   --        Recent Labs   Lab 09/15/24  1939 09/16/24  0645 24  0539   GLU 98 101* 98   * 102* 51*   CREATSERUM 11.40* 10.05* 7.24*   CA 8.5 8.9 8.4*   ALB 3.5  --   --     140 139   K 5.6* 5.0 4.4    109  103   CO2 19.0* 18.0* 23.0   ALKPHO 66  --   --    AST 55*  --   --    ALT 25  --   --    BILT 0.6  --   --    TP 6.8  --   --        Estimated Creatinine Clearance: 14.8 mL/min (A) (based on SCr of 7.24 mg/dL (H)).    Recent Labs   Lab 09/15/24  1939   PTP 14.3   INR 1.11            COVID-19 Lab Results    COVID-19  Lab Results   Component Value Date    COVID19 Not Detected 09/15/2024    COVID19 Not Detected 08/11/2024    COVID19 Not Detected 05/15/2024       Pro-Calcitonin  No results for input(s): \"PCT\" in the last 168 hours.    Cardiac  Recent Labs   Lab 09/15/24  1939   PBNP 28,618*       Creatinine Kinase  No results for input(s): \"CK\" in the last 168 hours.    Inflammatory Markers  No results for input(s): \"CRP\", \"HARJEET\", \"LDH\", \"DDIMER\" in the last 168 hours.    Imaging: Imaging data reviewed in Epic.    Medications:    ARIPiprazole  15 mg Oral Daily    umeclidinium bromide  1 puff Inhalation Daily    amphetamine-dextroamphetamine  15 mg Oral BID AC    cloNIDine  0.1 mg Oral BID    buPROPion ER  150 mg Oral Daily    carvedilol  25 mg Oral BID with meals    fenofibrate micronized  134 mg Oral Nightly    FLUoxetine HCl  40 mg Oral Daily    fluticasone propionate  1 spray Each Nare BID    hydrALAZINE  50 mg Oral BID    isosorbide mononitrate ER  30 mg Oral Daily    lamoTRIgine  150 mg Oral Daily    levoFLOXacin  250 mg Oral Q48HR @ 0700    losartan  100 mg Oral Daily    NIFEdipine ER  60 mg Oral BID    sevelamer carbonate  800 mg Oral TID CC    tamsulosin  0.4 mg Oral Daily    heparin  5,000 Units Subcutaneous Q8H [facility]       Assessment & Plan:      44 yr old male with PMH sig for ESRD on HD (M/W/F), bipolar, depression, DM 2, HTN, DL, CAD, COPD, HFpEF, and chronic abdominal pain who presented to the ED for evaluation of shortness of breath.      # Stage 5 CKD with fluid overload   # Hyperkalemia   - suspect due to missed HD  - renal c/s  - HD per renal  - monitor K     # Acute on chronic diastolic HF  # Elevated  troponin   # CAD  - suspect demand ischemia due to missed HD  - trop down trending   - last TTE 3/10/24 with intact LVEF  - cards c/s appreciated      # Anemia of chronic disease   # Hx of GI bleeding  - pt following with GI as outpt   - plan outpt capsule study   - monitor hgb     # Chronic abd pain  # Opioid dependence   - follows with pain clinic as outpt   - resume home pain medications   - minimize IV narcotics      # Prostatitis  - cont po levaquin     # L calf pain  - await LLE venous dopplers     # Essential HTN  - BP elevated on admit  - resume home BP meds     # HLD  - cont statin     # COPD  - no evidence of acute exacerbation   - resume home inhalers      # Major depression, recurrent   # Bipolar d/o  - stable  - resume home medications     Plan of care: inpt care    Plan of care discussed with patient or family at bedside.    Phong Freedman, DO    Supplementary Documentation:     Quality:  DVT Prophylaxis: hep subq  CODE status: FULL  Abbott: no  Central line: no  If COVID testing is negative, may discontinue isolation: yes     Estimated date of discharge: later today vs tomorrow   Discharge is dependent on: clinical course and renal clearance   At this point Mr. Fonseca is expected to be discharge to: home    Plan of care discussed with pt

## 2024-09-18 NOTE — PROGRESS NOTES
[facility]  Consultation Note      Godwin Fonseca Patient Status:  Inpatient    1980 MRN [id number]   Location [facility] 7NE-A Attending Phong Freedman, DO   Hosp Day # 3 PCP Prieto Novak MD     Reason for consult: Troponin    Subjective: No CP. SOB improved with HD.    Primary cardiologist: Devan Rankin MD     History of Present Illness:  Godwin Fonseca is a 44 year old male with ESRD on HD, H/o DVT/PE  s/p Eliquis x 6 mo, \"NSTEMI\"  with cath showing no sig CAD, h/o severe MR s/p MVR , redo complex robotically assisted MVR (2022), chronic HFpEF, h/o TIA, HTN, bipolar disease, who presented to [facility] on 9/15/2024 with SOB. Missed HD last Friday due to abdominal pain, vomiting and diarrhea. Since then has been SOB. He is at \"dry wt\" ~ 245-250 lbs. Denies LE edema. No CP. No fevers. BP has been high 180-190s. Last week had lower abd pain and dx'd with prostatitis, was taking levaquin. We are consulted for troponin ~ 500s. Again denies CP. Had cath  for + troponin that showed no sig CAD.     Medications:  Current Facility-Administered Medications   Medication Dose Route Frequency    ARIPiprazole (Abilify) tab 15 mg  15 mg Oral Daily    albuterol (Ventolin HFA) 108 (90 Base) MCG/ACT inhaler 2 puff  2 puff Inhalation Q6H PRN    umeclidinium bromide (Incruse Ellipta) 62.5 MCG/ACT inhaler 1 puff  1 puff Inhalation Daily    hydrALAzine (Apresoline) 20 mg/mL injection 10 mg  10 mg Intravenous Q6H PRN    amphetamine-dextroamphetamine (Adderall) tab 15 mg  15 mg Oral BID AC    HYDROmorphone (Dilaudid) 1 MG/ML injection 0.5 mg  0.5 mg Intravenous Q6H PRN    heparin (Porcine) 1000 UNIT/ML injection 1,500 Units  1.5 mL Intracatheter PRN Dialysis    cloNIDine (Catapres) tab 0.1 mg  0.1 mg Oral BID    buPROPion ER (Wellbutrin XL) 24 hr tab 150 mg  150 mg Oral Daily    carvedilol (Coreg) tab 25 mg  25 mg Oral BID with meals    dicyclomine (Bentyl) cap 10 mg   10 mg Oral TID PRN    fenofibrate micronized (Lofibra) cap 134 mg  134 mg Oral Nightly    FLUoxetine (PROzac) cap 40 mg  40 mg Oral Daily    fluticasone propionate (Flonase) 50 MCG/ACT nasal suspension 1 spray  1 spray Each Nare BID    hydrALAZINE (Apresoline) tab 50 mg  50 mg Oral BID    isosorbide mononitrate ER (Imdur) 24 hr tab 30 mg  30 mg Oral Daily    lamoTRIgine (LaMICtal) tab 150 mg  150 mg Oral Daily    levoFLOXacin (Levaquin) tab 250 mg  250 mg Oral Q48HR @ 0700    losartan (Cozaar) tab 100 mg  100 mg Oral Daily    NIFEdipine ER (Procardia-XL) 24 hr tab 60 mg  60 mg Oral BID    sevelamer carbonate (Renvela) tab 800 mg  800 mg Oral TID CC    tamsulosin (Flomax) cap 0.4 mg  0.4 mg Oral Daily    heparin (Porcine) 5000 UNIT/ML injection 5,000 Units  5,000 Units Subcutaneous Q8H MARTHA    acetaminophen (Tylenol Extra Strength) tab 500 mg  500 mg Oral Q4H PRN    acetaminophen (Tylenol) tab 650 mg  650 mg Oral Q4H PRN    Or    HYDROcodone-acetaminophen (Norco) 5-325 MG per tab 1 tablet  1 tablet Oral Q4H PRN    Or    HYDROcodone-acetaminophen (Norco) 5-325 MG per tab 2 tablet  2 tablet Oral Q4H PRN    polyethylene glycol (PEG 3350) (Miralax) 17 g oral packet 17 g  17 g Oral Daily PRN    sennosides (Senokot) tab 17.2 mg  17.2 mg Oral Nightly PRN    bisacodyl (Dulcolax) 10 MG rectal suppository 10 mg  10 mg Rectal Daily PRN    ondansetron (Zofran) 4 MG/2ML injection 4 mg  4 mg Intravenous Q6H PRN    prochlorperazine (Compazine) 10 MG/2ML injection 5 mg  5 mg Intravenous Q8H PRN       Past Medical History:    Asthma (HCC)    Attention deficit hyperactivity disorder (ADHD)    Back problem    Bipolar 1 disorder (HCC)    CKD (chronic kidney disease) stage 3, GFR 30-59 ml/min ([facility])    Dr Meeks    Congestive heart disease (HCC)    COPD (chronic obstructive pulmonary disease) (HCC)    Coronary atherosclerosis    Deep vein thrombosis (HCC)    at age 17 R/T cast    Depression    Diabetes (HCC)    Essential hypertension     3/21 echo: severe concentric LVH with normal EF and no MR or pHTN    Extrinsic asthma, unspecified    Heart attack (HCC)    2016- angiogram- no intervention    Heart valve disease    mitral valve repair in 1994/    High blood pressure    High cholesterol    History of blood transfusion    History of mitral valve repair    Hyperlipidemia    Low back pain    tight and stiff after sweeping and mopping    LVH (left ventricular hypertrophy)    Migraines    Mixed hyperlipidemia     HDL 38 LDL 97 VLDL 57     Monoclonal gammopathy    IgG kappa     MVP (mitral valve prolapse)    Repair 1994 at [place]; echoes as recently as 3/21 show mild or trivial MR and no stenosis    Neuropathy    Osteoarthritis    hip ,knees    Pneumonia due to organism    Pulmonary embolism (HCC)    Renal disorder    Stroke (HCC)    TIA (transient ischemic attack)    Initial history of left-sided weakness and slurred speech. (+) cocaine. MRI of the brain, CT angiogram of the head and neck, and 2D echo are all unremarkable.     TMJ (dislocation of temporomandibular joint)    Troponin level elevated    Trop 60 60 47 with TIA and no CP: Lexiscan negative with EF 51       Past Surgical History:   Procedure Laterality Date    Av fistula revision, open Left     Colonoscopy N/A 03/26/2023    Procedure: COLONOSCOPY;  Surgeon: Heath Vu MD;  Location:  ENDOSCOPY    Colonoscopy N/A 12/30/2023    Procedure: COLONOSCOPY with cold snare polypectomy and forcep polypectomy;  Surgeon: Ousmane Suarez MD;  Location:  ENDOSCOPY    Colonoscopy & polypectomy  2019    Egd  2019    Duodenitis. Biopsied. EUS for weight loss was negative    Heart surgery      Hernia surgery  08/17/2022    Dr Barnes    Laminectomy,>2 sgmt,lumbar  09/06/2018    L4-L5 Decomp Discectomy ROEM L4-L5    Mitralplasty w cp bypass  1994    [place]: Repair    Repair rotator cuff,chronic Left     torn and had a ruptured bicep    Valve repair  1994    mitral valve       Family  History  family history includes Alcohol and Other Disorders Associated in his father and maternal grandfather; Anxiety in his maternal aunt and maternal aunt; Bipolar Disorder in his maternal aunt; Cancer in his father, maternal grandfather, maternal uncle, mother, paternal aunt, and sister; Dementia in his father; Depression in his maternal aunt and maternal aunt; Diabetes in his maternal grandfather, maternal grandmother, and mother; Hypertension in his father, maternal grandfather, maternal grandmother, mother, paternal grandfather, paternal grandmother, and sister; Substance Abuse in his father.    Social History   reports that he quit smoking about 2 years ago. His smoking use included cigarettes. He started smoking about 29 years ago. He has a 27 pack-year smoking history. He has never been exposed to tobacco smoke. He has never used smokeless tobacco. He reports that he does not drink alcohol and does not use drugs.     Allergies  Allergies   Allergen Reactions    Hydrochlorothiazide RASH and HIVES       Review of Systems:  As per HPI, otherwise 10 point ROS is negative in detail.    Physical Exam:  Blood pressure 100/60, pulse 97, temperature 98 °F (36.7 °C), temperature source Oral, resp. rate 16, height 74\", weight 245 lb (111.1 kg), SpO2 97%.  Temp (24hrs), Av.3 °F (36.8 °C), Min:98 °F (36.7 °C), Max:98.5 °F (36.9 °C)    Wt Readings from Last 3 Encounters:   24 245 lb (111.1 kg)   24 248 lb 8 oz (112.7 kg)   24 250 lb (113.4 kg)       General: Awake and alert; in no acute distress  HEENT: Extraocular movements are intact; sclerae are anicteric; scalp is atraumatic  Neck: Supple; no JVD; no carotid bruits  Cardiac: Regular rate and regular rhythm; normal S1 and S2, 2/6 HSM apex, no rubs, or gallops are appreciated  Lungs: Clear to auscultation bilaterally; no accessory muscle use is noted, no wheezes, rhonci or rales  Abdomen: Soft, non-distended, non-tender; bowel sounds are  normoactive  Extremities: Warm, no edema, clubbing or cyanosis; moves all 4 extremities normally, distal pulses intact and equal  Psychiatric: Normal mood and affect; answers questions appropriately  Dermatologic: No rashes; normal skin turgor    Diagnostic testing:    Labs:   Lab Results   Component Value Date    PT 13.9 12/14/2012    INR 1.11 09/15/2024    INR 1.05 08/30/2024                 Cardiac diagnostics:    Telemetry: Predominantly sinus rhythm, no malignant arrhythmias     CXR 9/15/24  There is interval improvement in consolidation right lower lobe consistent with improved right lower lobe pneumonia or atelectasis.  The remainder of the lungs is clear.  Heart size is within normal limits.  Mediastinum and elenita are   unremarkable.  Right internal jugular tunneled dialysis catheter has been removed.     EKG 9/15/2024:   Normal sinus rhythm  Minimal voltage criteria for LVH, may be normal variant ( Sokolow-Rojas )  Borderline ECG     Echo 3/10/24:  1. Left ventricle: The cavity size was normal. There is moderarte concentric      left ventricular hypertrophy Systolic function was normal. The estimated      ejection fraction was 55-60%, by visual assessment. Wall motion is      normal; there are no regional wall motion abnormalities.   2. Mitral valve: There was mild to moderate regurgitation. There is history      of annular ring repair The mean diastolic gradient was 2mm Hg.     Impression:  44 year old male presenting with SOB after missing HD session (last 5 days PTA)  Mild troponin elevation - chronic, does not reflect ACS  \"NSTEMI\" 4/23 with cath showing no sig CAD  H/o severe MR s/p MVR 1994, redo complex robotically assisted MVR ([place] 2022)  Chronic HFpEF  ESRD on HD  H/o DVT/PE 1/23 s/p Eliquis x 6 mo  Primary hyperaldosteronism  HTN - not controlled  H/o TIA  Bipolar disease  H/o cocaine use  H/o RLL PNA (8/8/24) treated with Abx, prednisone  Melena - C-scope, EGD no source, planned for capsule  endoscopy  Chronic anemia    Recommendations:  Issues with L AVF, vascular to evalutae   HD per renal for volume and BP management  Continue usual home antihypertensive regimen for now with IV prn   Not on ASA due to recent GIB    Thank you for allowing our practice to participate in the care of your patient. Please do not hesitate to contact me if you have any questions.    Micheal Campos MD  Interventional Cardiology  [facility]  Office: [phone]

## 2024-09-18 NOTE — PROGRESS NOTES
[facility]  Consultation Note      Godwin Fonseca Patient Status:  Inpatient    1980 MRN [id number]   Location [facility] 7NE-A Attending Phong Freedman, DO   Hosp Day # 2 PCP Prieto Novak MD     Reason for consult: Troponin    Subjective: No CP. SOB improved with HD.    Primary cardiologist: Devan Rankin MD     History of Present Illness:  Godwin Fonseca is a 44 year old male with ESRD on HD, H/o DVT/PE  s/p Eliquis x 6 mo, \"NSTEMI\"  with cath showing no sig CAD, h/o severe MR s/p MVR , redo complex robotically assisted MVR (2022), chronic HFpEF, h/o TIA, HTN, bipolar disease, who presented to [facility] on 9/15/2024 with SOB. Missed HD last Friday due to abdominal pain, vomiting and diarrhea. Since then has been SOB. He is at \"dry wt\" ~ 245-250 lbs. Denies LE edema. No CP. No fevers. BP has been high 180-190s. Last week had lower abd pain and dx'd with prostatitis, was taking levaquin. We are consulted for troponin ~ 500s. Again denies CP. Had cath  for + troponin that showed no sig CAD.     Medications:  Current Facility-Administered Medications   Medication Dose Route Frequency    ARIPiprazole (Abilify) tab 15 mg  15 mg Oral Daily    albuterol (Ventolin HFA) 108 (90 Base) MCG/ACT inhaler 2 puff  2 puff Inhalation Q6H PRN    umeclidinium bromide (Incruse Ellipta) 62.5 MCG/ACT inhaler 1 puff  1 puff Inhalation Daily    hydrALAzine (Apresoline) 20 mg/mL injection 10 mg  10 mg Intravenous Q6H PRN    amphetamine-dextroamphetamine (Adderall) tab 15 mg  15 mg Oral BID AC    HYDROmorphone (Dilaudid) 1 MG/ML injection 0.5 mg  0.5 mg Intravenous Q6H PRN    sodium chloride 0.9 % IV bolus 100 mL  100 mL Intravenous Q30 Min PRN    And    albumin human (Albumin) 25% injection 25 g  25 g Intravenous PRN Dialysis    heparin (Porcine) 1000 UNIT/ML injection 1,500 Units  1.5 mL Intracatheter PRN Dialysis    cloNIDine (Catapres) tab 0.1 mg  0.1 mg Oral BID     buPROPion ER (Wellbutrin XL) 24 hr tab 150 mg  150 mg Oral Daily    carvedilol (Coreg) tab 25 mg  25 mg Oral BID with meals    dicyclomine (Bentyl) cap 10 mg  10 mg Oral TID PRN    fenofibrate micronized (Lofibra) cap 134 mg  134 mg Oral Nightly    FLUoxetine (PROzac) cap 40 mg  40 mg Oral Daily    fluticasone propionate (Flonase) 50 MCG/ACT nasal suspension 1 spray  1 spray Each Nare BID    hydrALAZINE (Apresoline) tab 50 mg  50 mg Oral BID    isosorbide mononitrate ER (Imdur) 24 hr tab 30 mg  30 mg Oral Daily    lamoTRIgine (LaMICtal) tab 150 mg  150 mg Oral Daily    levoFLOXacin (Levaquin) tab 250 mg  250 mg Oral Q48HR @ 0700    losartan (Cozaar) tab 100 mg  100 mg Oral Daily    NIFEdipine ER (Procardia-XL) 24 hr tab 60 mg  60 mg Oral BID    sevelamer carbonate (Renvela) tab 800 mg  800 mg Oral TID CC    tamsulosin (Flomax) cap 0.4 mg  0.4 mg Oral Daily    heparin (Porcine) 5000 UNIT/ML injection 5,000 Units  5,000 Units Subcutaneous Q8H MARTHA    acetaminophen (Tylenol Extra Strength) tab 500 mg  500 mg Oral Q4H PRN    acetaminophen (Tylenol) tab 650 mg  650 mg Oral Q4H PRN    Or    HYDROcodone-acetaminophen (Norco) 5-325 MG per tab 1 tablet  1 tablet Oral Q4H PRN    Or    HYDROcodone-acetaminophen (Norco) 5-325 MG per tab 2 tablet  2 tablet Oral Q4H PRN    polyethylene glycol (PEG 3350) (Miralax) 17 g oral packet 17 g  17 g Oral Daily PRN    sennosides (Senokot) tab 17.2 mg  17.2 mg Oral Nightly PRN    bisacodyl (Dulcolax) 10 MG rectal suppository 10 mg  10 mg Rectal Daily PRN    ondansetron (Zofran) 4 MG/2ML injection 4 mg  4 mg Intravenous Q6H PRN    prochlorperazine (Compazine) 10 MG/2ML injection 5 mg  5 mg Intravenous Q8H PRN       Past Medical History:    Asthma (HCC)    Attention deficit hyperactivity disorder (ADHD)    Back problem    Bipolar 1 disorder (HCC)    CKD (chronic kidney disease) stage 3, GFR 30-59 ml/min (HCC)    Dr Meeks    Congestive heart disease (HCC)    COPD (chronic obstructive pulmonary  disease) (HCC)    Coronary atherosclerosis    Deep vein thrombosis (HCC)    at age 17 R/T cast    Depression    Diabetes (HCC)    Essential hypertension    3/21 echo: severe concentric LVH with normal EF and no MR or pHTN    Extrinsic asthma, unspecified    Heart attack (HCC)    2016- angiogram- no intervention    Heart valve disease    mitral valve repair in 1994/    High blood pressure    High cholesterol    History of blood transfusion    History of mitral valve repair    Hyperlipidemia    Low back pain    tight and stiff after sweeping and mopping    LVH (left ventricular hypertrophy)    Migraines    Mixed hyperlipidemia     HDL 38 LDL 97 VLDL 57     Monoclonal gammopathy    IgG kappa     MVP (mitral valve prolapse)    Repair 1994 at [place]; echoes as recently as 3/21 show mild or trivial MR and no stenosis    Neuropathy    Osteoarthritis    hip ,knees    Pneumonia due to organism    Pulmonary embolism (HCC)    Renal disorder    Stroke (HCC)    TIA (transient ischemic attack)    Initial history of left-sided weakness and slurred speech. (+) cocaine. MRI of the brain, CT angiogram of the head and neck, and 2D echo are all unremarkable.     TMJ (dislocation of temporomandibular joint)    Troponin level elevated    Trop 60 60 47 with TIA and no CP: Lexiscan negative with EF 51       Past Surgical History:   Procedure Laterality Date    Av fistula revision, open Left     Colonoscopy N/A 03/26/2023    Procedure: COLONOSCOPY;  Surgeon: Heath Vu MD;  Location:  ENDOSCOPY    Colonoscopy N/A 12/30/2023    Procedure: COLONOSCOPY with cold snare polypectomy and forcep polypectomy;  Surgeon: Ousmane Suarez MD;  Location:  ENDOSCOPY    Colonoscopy & polypectomy  2019    Egd  2019    Duodenitis. Biopsied. EUS for weight loss was negative    Heart surgery      Hernia surgery  08/17/2022    Dr Barnes    Laminectomy,>2 sgmt,lumbar  09/06/2018    L4-L5 Decomp Discectomy ROEM L4-L5    Mitralplasty w cp  bypass      [place]: Repair    Repair rotator cuff,chronic Left     torn and had a ruptured bicep    Valve repair      mitral valve       Family History  family history includes Alcohol and Other Disorders Associated in his father and maternal grandfather; Anxiety in his maternal aunt and maternal aunt; Bipolar Disorder in his maternal aunt; Cancer in his father, maternal grandfather, maternal uncle, mother, paternal aunt, and sister; Dementia in his father; Depression in his maternal aunt and maternal aunt; Diabetes in his maternal grandfather, maternal grandmother, and mother; Hypertension in his father, maternal grandfather, maternal grandmother, mother, paternal grandfather, paternal grandmother, and sister; Substance Abuse in his father.    Social History   reports that he quit smoking about 2 years ago. His smoking use included cigarettes. He started smoking about 29 years ago. He has a 27 pack-year smoking history. He has never been exposed to tobacco smoke. He has never used smokeless tobacco. He reports that he does not drink alcohol and does not use drugs.     Allergies  Allergies   Allergen Reactions    Hydrochlorothiazide RASH and HIVES       Review of Systems:  As per HPI, otherwise 10 point ROS is negative in detail.    Physical Exam:  Blood pressure 109/68, pulse 104, temperature 98.5 °F (36.9 °C), temperature source Oral, resp. rate 14, height 74\", weight 245 lb (111.1 kg), SpO2 93%.  Temp (24hrs), Av.9 °F (36.6 °C), Min:97.4 °F (36.3 °C), Max:98.5 °F (36.9 °C)    Wt Readings from Last 3 Encounters:   24 245 lb (111.1 kg)   24 248 lb 8 oz (112.7 kg)   24 250 lb (113.4 kg)       General: Awake and alert; in no acute distress  HEENT: Extraocular movements are intact; sclerae are anicteric; scalp is atraumatic  Neck: Supple; no JVD; no carotid bruits  Cardiac: Regular rate and regular rhythm; normal S1 and S2, 2/6 HSM apex, no rubs, or gallops are appreciated  Lungs: Clear  to auscultation bilaterally; no accessory muscle use is noted, no wheezes, rhonci or rales  Abdomen: Soft, non-distended, non-tender; bowel sounds are normoactive  Extremities: Warm, no edema, clubbing or cyanosis; moves all 4 extremities normally, distal pulses intact and equal  Psychiatric: Normal mood and affect; answers questions appropriately  Dermatologic: No rashes; normal skin turgor    Diagnostic testing:    Labs:   Lab Results   Component Value Date    PT 13.9 12/14/2012    INR 1.11 09/15/2024    INR 1.05 08/30/2024          Lab Results   Component Value Date    WBC 5.6 09/17/2024    HGB 9.5 09/17/2024    HCT 28.6 09/17/2024    .0 09/17/2024    CREATSERUM 7.24 09/17/2024    BUN 51 09/17/2024     09/17/2024    K 4.4 09/17/2024     09/17/2024    CO2 23.0 09/17/2024    GLU 98 09/17/2024    CA 8.4 09/17/2024       Cardiac diagnostics:    Telemetry: Predominantly sinus rhythm, no malignant arrhythmias     CXR 9/15/24  There is interval improvement in consolidation right lower lobe consistent with improved right lower lobe pneumonia or atelectasis.  The remainder of the lungs is clear.  Heart size is within normal limits.  Mediastinum and elenita are   unremarkable.  Right internal jugular tunneled dialysis catheter has been removed.     EKG 9/15/2024:   Normal sinus rhythm  Minimal voltage criteria for LVH, may be normal variant ( Sokolow-Rojas )  Borderline ECG     Echo 3/10/24:  1. Left ventricle: The cavity size was normal. There is moderarte concentric      left ventricular hypertrophy Systolic function was normal. The estimated      ejection fraction was 55-60%, by visual assessment. Wall motion is      normal; there are no regional wall motion abnormalities.   2. Mitral valve: There was mild to moderate regurgitation. There is history      of annular ring repair The mean diastolic gradient was 2mm Hg.     Impression:  44 year old male presenting with SOB after missing HD session (last 5  days PTA)  Mild troponin elevation - chronic, does not reflect ACS  \"NSTEMI\" 4/23 with cath showing no sig CAD  H/o severe MR s/p MVR 1994, redo complex robotically assisted MVR ([place] 2022)  Chronic HFpEF  ESRD on HD  H/o DVT/PE 1/23 s/p Eliquis x 6 mo  Primary hyperaldosteronism  HTN - not controlled  H/o TIA  Bipolar disease  H/o cocaine use  H/o RLL PNA (8/8/24) treated with Abx, prednisone  Melena - C-scope, EGD no source, planned for capsule endoscopy  Chronic anemia    Recommendations:  Extra session HD today, per renal for volume and BP management  Continue usual home antihypertensive regimen for now with IV prn   Not on ASA due to recent GIB    Thank you for allowing our practice to participate in the care of your patient. Please do not hesitate to contact me if you have any questions.    Micheal Campos MD  Interventional Cardiology  [facility]  Office: [phone]

## 2024-09-19 ENCOUNTER — APPOINTMENT (OUTPATIENT)
Dept: ULTRASOUND IMAGING | Facility: HOSPITAL | Age: 44
End: 2024-09-19
Attending: INTERNAL MEDICINE
Payer: MEDICARE

## 2024-09-19 PROCEDURE — 93971 EXTREMITY STUDY: CPT | Performed by: INTERNAL MEDICINE

## 2024-09-19 NOTE — PROGRESS NOTES
[facility] Nephrology  Inpatient Follow-up    Godwin Fonseca Patient Status:  Inpatient    1980 MRN [id number]   [facility] 7NE-A Attending Phong Freedman, DO   Hosp Day # 4 PCP Prieto Novak MD       SUBJECTIVE:     Plan for tunnel catheter placement today, HD tomorrow.    MEDICATIONS:     Current Facility-Administered Medications   Medication Dose Route Frequency    ARIPiprazole (Abilify) tab 15 mg  15 mg Oral Daily    albuterol (Ventolin HFA) 108 (90 Base) MCG/ACT inhaler 2 puff  2 puff Inhalation Q6H PRN    umeclidinium bromide (Incruse Ellipta) 62.5 MCG/ACT inhaler 1 puff  1 puff Inhalation Daily    hydrALAzine (Apresoline) 20 mg/mL injection 10 mg  10 mg Intravenous Q6H PRN    amphetamine-dextroamphetamine (Adderall) tab 15 mg  15 mg Oral BID AC    HYDROmorphone (Dilaudid) 1 MG/ML injection 0.5 mg  0.5 mg Intravenous Q6H PRN    heparin (Porcine) 1000 UNIT/ML injection 1,500 Units  1.5 mL Intracatheter PRN Dialysis    cloNIDine (Catapres) tab 0.1 mg  0.1 mg Oral BID    buPROPion ER (Wellbutrin XL) 24 hr tab 150 mg  150 mg Oral Daily    carvedilol (Coreg) tab 25 mg  25 mg Oral BID with meals    dicyclomine (Bentyl) cap 10 mg  10 mg Oral TID PRN    fenofibrate micronized (Lofibra) cap 134 mg  134 mg Oral Nightly    FLUoxetine (PROzac) cap 40 mg  40 mg Oral Daily    fluticasone propionate (Flonase) 50 MCG/ACT nasal suspension 1 spray  1 spray Each Nare BID    hydrALAZINE (Apresoline) tab 50 mg  50 mg Oral BID    isosorbide mononitrate ER (Imdur) 24 hr tab 30 mg  30 mg Oral Daily    lamoTRIgine (LaMICtal) tab 150 mg  150 mg Oral Daily    levoFLOXacin (Levaquin) tab 250 mg  250 mg Oral Q48HR @ 0700    losartan (Cozaar) tab 100 mg  100 mg Oral Daily    NIFEdipine ER (Procardia-XL) 24 hr tab 60 mg  60 mg Oral BID    sevelamer carbonate (Renvela) tab 800 mg  800 mg Oral TID CC    tamsulosin (Flomax) cap 0.4 mg  0.4 mg Oral Daily    heparin (Porcine) 5000 UNIT/ML injection 5,000  Units  5,000 Units Subcutaneous Q8H [facility]    acetaminophen (Tylenol Extra Strength) tab 500 mg  500 mg Oral Q4H PRN    acetaminophen (Tylenol) tab 650 mg  650 mg Oral Q4H PRN    Or    HYDROcodone-acetaminophen (Norco) 5-325 MG per tab 1 tablet  1 tablet Oral Q4H PRN    Or    HYDROcodone-acetaminophen (Norco) 5-325 MG per tab 2 tablet  2 tablet Oral Q4H PRN    polyethylene glycol (PEG 3350) (Miralax) 17 g oral packet 17 g  17 g Oral Daily PRN    sennosides (Senokot) tab 17.2 mg  17.2 mg Oral Nightly PRN    bisacodyl (Dulcolax) 10 MG rectal suppository 10 mg  10 mg Rectal Daily PRN    ondansetron (Zofran) 4 MG/2ML injection 4 mg  4 mg Intravenous Q6H PRN    prochlorperazine (Compazine) 10 MG/2ML injection 5 mg  5 mg Intravenous Q8H PRN       PHYSICAL EXAM:     Vital Signs: /82 (BP Location: Right arm)   Pulse 93   Temp 97.7 °F (36.5 °C) (Temporal)   Resp 16   Ht 6' 2\" (1.88 m)   Wt 245 lb (111.1 kg)   SpO2 94%   BMI 31.46 kg/m²   Temp (24hrs), Av.2 °F (36.8 °C), Min:97.6 °F (36.4 °C), Max:98.6 °F (37 °C)       Intake/Output Summary (Last 24 hours) at 2024 1354  Last data filed at 2024 0745  Gross per 24 hour   Intake 240 ml   Output --   Net 240 ml     Wt Readings from Last 3 Encounters:   24 245 lb (111.1 kg)   24 248 lb 8 oz (112.7 kg)   24 250 lb (113.4 kg)       General: no acute distress  HEENT: normocephalic, atraumatic  CV: RRR  Respiratory: no distress  Abdomen: soft, non-tender  Extremities: no edema bilaterally  Skin: warm, dry  Neuro: awake, alert     LABORATORY DATA:     Lab Results   Component Value Date    GLU 98 2024    BUN 51 (H) 2024    BUNCREA 13.0 2022    CREATSERUM 7.24 (H) 2024    ANIONGAP 13 2024    GFR 59 (L) 2018    GFRNAA 30 (L) 2022    GFRAA 35 (L) 2022    CA 8.4 (L) 2024    OSMOCALC 302 (H) 2024    ALKPHO 66 09/15/2024    AST 55 (H) 09/15/2024    ALT 25 09/15/2024    BILT 0.6 09/15/2024     TP 6.8 09/15/2024    ALB 3.5 09/15/2024    GLOBULIN 3.3 09/15/2024     09/17/2024    K 4.4 09/17/2024     09/17/2024    CO2 23.0 09/17/2024     Lab Results   Component Value Date    WBC 5.6 09/17/2024    RBC 3.02 (L) 09/17/2024    HGB 9.5 (L) 09/17/2024    HCT 28.6 (L) 09/17/2024    .0 09/17/2024    MPV 11.5 12/14/2012    MCV 94.7 09/17/2024    MCH 31.5 09/17/2024    MCHC 33.2 09/17/2024    RDW 15.1 09/17/2024    NEPRELIM 3.52 09/17/2024    NEPERCENT 63.4 09/17/2024    LYPERCENT 20.4 09/17/2024    MOPERCENT 11.5 09/17/2024    EOPERCENT 3.2 09/17/2024    BAPERCENT 1.3 09/17/2024    NE 3.52 09/17/2024    LYMABS 1.13 09/17/2024    MOABSO 0.64 09/17/2024    EOABSO 0.18 09/17/2024    BAABSO 0.07 09/17/2024     Lab Results   Component Value Date    MALBP 167.00 05/24/2023    CREUR 142.00 05/24/2023    CREUR 142.00 05/24/2023     Lab Results   Component Value Date    COLORUR Colorless (A) 09/16/2024    CLARITY Clear 09/16/2024    SPECGRAVITY 1.012 09/16/2024    GLUUR Normal 09/16/2024    BILUR Negative 09/16/2024    KETUR Negative 09/16/2024    BLOODURINE 3+ (A) 09/16/2024    PHURINE 6.0 09/16/2024    PROUR 50 (A) 09/16/2024    UROBILINOGEN Normal 09/16/2024    NITRITE Negative 09/16/2024    LEUUR Negative 09/16/2024    WBCUR 1-5 09/16/2024    RBCUR 0-2 09/16/2024    EPIUR Few (A) 09/16/2024    BACUR None Seen 09/16/2024    CAOXUR Occasional (A) 04/20/2018    HYLUR Present (A) 05/14/2019       IMAGING:     Reviewed    ASSESSMENT:      # ESRD on HD  -MWF schedule outpatient  -[facility]  -L AVF      # HTN/Volume Status     # Hyperkalemia     # Metabolic acidosis     # Anemia     # Secondary Hyperparathyroidism    PLAN:      -Issues with L AVF - needs rest per Vascular surgery.   -Will plan for tunnel catheter placement - appreciate IR  -HD per MWF schedule - HD tomorrow per schedule after tunnel catheter placement  -UF with HD as tolerated  -Continue home BP medications - change clonidine to  0.1mg BID to avoid rebound  -Defer OWEN in setting of high BPs  -Continue sevelamer, will check phosphorus levels  -Avoid nephrotoxins and renally dose medications for creatinine clearance  -Monitor intake and output daily  -Daily weights        We will continue to follow    Thank you for allowing us to participate in the care of this patient.       DO Kevin Hallman [facility] [place] - Nephrology

## 2024-09-19 NOTE — PLAN OF CARE
Assumed care at 0700  Alert and oriented x4   NSR/ST on tele and on RA  Pain in abd, dilaudid & norco given  Plan for temp cath placed tomorrow  9/20 AM  Call light w/in reach

## 2024-09-19 NOTE — PLAN OF CARE
Assumed care at 1930  Alert and oriented x4   NSR/ST on tele and on RA  Pain in abd, norco w/ zofran given  Plan for US of LLE  Call light w/in reach  Report given to Heather GOMEZ

## 2024-09-19 NOTE — PROGRESS NOTES
[facility] Cardiology  Consultation Note      Godwin Fonseca Patient Status:  Inpatient    1980 MRN [id number]   Location [facility] 7NE-A Attending Phong Freedman, DO   Hosp Day # 4 PCP Prieto Novak MD     Reason for consult: Troponin    Subjective: OC, no reports CP or SOB.     Primary cardiologist: Devan Rankin MD     History of Present Illness:  Godwin Fonseca is a 44 year old male with ESRD on HD, H/o DVT/PE  s/p Eliquis x 6 mo, \"NSTEMI\"  with cath showing no sig CAD, h/o severe MR s/p MVR , redo complex robotically assisted MVR (2022), chronic HFpEF, h/o TIA, HTN, bipolar disease, who presented to [facility] on 9/15/2024 with SOB. Missed HD last Friday due to abdominal pain, vomiting and diarrhea. Since then has been SOB. He is at \"dry wt\" ~ 245-250 lbs. Denies LE edema. No CP. No fevers. BP has been high 180-190s. Last week had lower abd pain and dx'd with prostatitis, was taking levaquin. We are consulted for troponin ~ 500s. Again denies CP. Had cath  for + troponin that showed no sig CAD.     Medications:  Current Facility-Administered Medications   Medication Dose Route Frequency    ARIPiprazole (Abilify) tab 15 mg  15 mg Oral Daily    albuterol (Ventolin HFA) 108 (90 Base) MCG/ACT inhaler 2 puff  2 puff Inhalation Q6H PRN    umeclidinium bromide (Incruse Ellipta) 62.5 MCG/ACT inhaler 1 puff  1 puff Inhalation Daily    hydrALAzine (Apresoline) 20 mg/mL injection 10 mg  10 mg Intravenous Q6H PRN    amphetamine-dextroamphetamine (Adderall) tab 15 mg  15 mg Oral BID AC    HYDROmorphone (Dilaudid) 1 MG/ML injection 0.5 mg  0.5 mg Intravenous Q6H PRN    heparin (Porcine) 1000 UNIT/ML injection 1,500 Units  1.5 mL Intracatheter PRN Dialysis    cloNIDine (Catapres) tab 0.1 mg  0.1 mg Oral BID    buPROPion ER (Wellbutrin XL) 24 hr tab 150 mg  150 mg Oral Daily    carvedilol (Coreg) tab 25 mg  25 mg Oral BID with meals    dicyclomine (Bentyl) cap 10 mg   10 mg Oral TID PRN    fenofibrate micronized (Lofibra) cap 134 mg  134 mg Oral Nightly    FLUoxetine (PROzac) cap 40 mg  40 mg Oral Daily    fluticasone propionate (Flonase) 50 MCG/ACT nasal suspension 1 spray  1 spray Each Nare BID    hydrALAZINE (Apresoline) tab 50 mg  50 mg Oral BID    isosorbide mononitrate ER (Imdur) 24 hr tab 30 mg  30 mg Oral Daily    lamoTRIgine (LaMICtal) tab 150 mg  150 mg Oral Daily    levoFLOXacin (Levaquin) tab 250 mg  250 mg Oral Q48HR @ 0700    losartan (Cozaar) tab 100 mg  100 mg Oral Daily    NIFEdipine ER (Procardia-XL) 24 hr tab 60 mg  60 mg Oral BID    sevelamer carbonate (Renvela) tab 800 mg  800 mg Oral TID CC    tamsulosin (Flomax) cap 0.4 mg  0.4 mg Oral Daily    heparin (Porcine) 5000 UNIT/ML injection 5,000 Units  5,000 Units Subcutaneous Q8H [facility]    acetaminophen (Tylenol Extra Strength) tab 500 mg  500 mg Oral Q4H PRN    acetaminophen (Tylenol) tab 650 mg  650 mg Oral Q4H PRN    Or    HYDROcodone-acetaminophen (Norco) 5-325 MG per tab 1 tablet  1 tablet Oral Q4H PRN    Or    HYDROcodone-acetaminophen (Norco) 5-325 MG per tab 2 tablet  2 tablet Oral Q4H PRN    polyethylene glycol (PEG 3350) (Miralax) 17 g oral packet 17 g  17 g Oral Daily PRN    sennosides (Senokot) tab 17.2 mg  17.2 mg Oral Nightly PRN    bisacodyl (Dulcolax) 10 MG rectal suppository 10 mg  10 mg Rectal Daily PRN    ondansetron (Zofran) 4 MG/2ML injection 4 mg  4 mg Intravenous Q6H PRN    prochlorperazine (Compazine) 10 MG/2ML injection 5 mg  5 mg Intravenous Q8H PRN       Past Medical History:    Asthma (HCC)    Attention deficit hyperactivity disorder (ADHD)    Back problem    Bipolar 1 disorder (HCC)    CKD (chronic kidney disease) stage 3, GFR 30-59 ml/min ([facility])    Dr Meeks    Congestive heart disease (HCC)    COPD (chronic obstructive pulmonary disease) (HCC)    Coronary atherosclerosis    Deep vein thrombosis (HCC)    at age 17 R/T cast    Depression    Diabetes (HCC)    Essential hypertension     3/21 echo: severe concentric LVH with normal EF and no MR or pHTN    Extrinsic asthma, unspecified    Heart attack (HCC)    2016- angiogram- no intervention    Heart valve disease    mitral valve repair in 1994/    High blood pressure    High cholesterol    History of blood transfusion    History of mitral valve repair    Hyperlipidemia    Low back pain    tight and stiff after sweeping and mopping    LVH (left ventricular hypertrophy)    Migraines    Mixed hyperlipidemia     HDL 38 LDL 97 VLDL 57     Monoclonal gammopathy    IgG kappa     MVP (mitral valve prolapse)    Repair 1994 at [place]; echoes as recently as 3/21 show mild or trivial MR and no stenosis    Neuropathy    Osteoarthritis    hip ,knees    Pneumonia due to organism    Pulmonary embolism (HCC)    Renal disorder    Stroke (HCC)    TIA (transient ischemic attack)    Initial history of left-sided weakness and slurred speech. (+) cocaine. MRI of the brain, CT angiogram of the head and neck, and 2D echo are all unremarkable.     TMJ (dislocation of temporomandibular joint)    Troponin level elevated    Trop 60 60 47 with TIA and no CP: Lexiscan negative with EF 51       Past Surgical History:   Procedure Laterality Date    Av fistula revision, open Left     Colonoscopy N/A 03/26/2023    Procedure: COLONOSCOPY;  Surgeon: Heath Vu MD;  Location:  ENDOSCOPY    Colonoscopy N/A 12/30/2023    Procedure: COLONOSCOPY with cold snare polypectomy and forcep polypectomy;  Surgeon: Ousmane Suarez MD;  Location:  ENDOSCOPY    Colonoscopy & polypectomy  2019    Egd  2019    Duodenitis. Biopsied. EUS for weight loss was negative    Heart surgery      Hernia surgery  08/17/2022    Dr Barnes    Laminectomy,>2 sgmt,lumbar  09/06/2018    L4-L5 Decomp Discectomy ROEM L4-L5    Mitralplasty w cp bypass  1994    [place]: Repair    Repair rotator cuff,chronic Left     torn and had a ruptured bicep    Valve repair  1994    mitral valve       Family  History  family history includes Alcohol and Other Disorders Associated in his father and maternal grandfather; Anxiety in his maternal aunt and maternal aunt; Bipolar Disorder in his maternal aunt; Cancer in his father, maternal grandfather, maternal uncle, mother, paternal aunt, and sister; Dementia in his father; Depression in his maternal aunt and maternal aunt; Diabetes in his maternal grandfather, maternal grandmother, and mother; Hypertension in his father, maternal grandfather, maternal grandmother, mother, paternal grandfather, paternal grandmother, and sister; Substance Abuse in his father.    Social History   reports that he quit smoking about 2 years ago. His smoking use included cigarettes. He started smoking about 29 years ago. He has a 27 pack-year smoking history. He has never been exposed to tobacco smoke. He has never used smokeless tobacco. He reports that he does not drink alcohol and does not use drugs.     Allergies  Allergies   Allergen Reactions    Hydrochlorothiazide RASH and HIVES       Review of Systems:  As per HPI, otherwise 10 point ROS is negative in detail.    Physical Exam:  Blood pressure 129/75, pulse 96, temperature 97.6 °F (36.4 °C), temperature source Temporal, resp. rate 16, height 74\", weight 245 lb (111.1 kg), SpO2 93%.  Temp (24hrs), Av.2 °F (36.8 °C), Min:97.6 °F (36.4 °C), Max:98.6 °F (37 °C)    Wt Readings from Last 3 Encounters:   24 245 lb (111.1 kg)   24 248 lb 8 oz (112.7 kg)   24 250 lb (113.4 kg)       General: Awake and alert; in no acute distress  HEENT: Extraocular movements are intact; sclerae are anicteric; scalp is atraumatic  Neck: Supple; no JVD; no carotid bruits  Cardiac: Regular rate and regular rhythm; normal S1 and S2, 2/6 HSM apex, no rubs, or gallops are appreciated  Lungs: Clear to auscultation bilaterally; no accessory muscle use is noted, no wheezes, rhonci or rales  Abdomen: Soft, non-distended, non-tender; bowel sounds are  normoactive  Extremities: Warm, no edema, clubbing or cyanosis; moves all 4 extremities normally, distal pulses intact and equal  Psychiatric: Normal mood and affect; answers questions appropriately  Dermatologic: No rashes; normal skin turgor    Diagnostic testing:    Labs:   Lab Results   Component Value Date    PT 13.9 12/14/2012    INR 1.11 09/15/2024    INR 1.05 08/30/2024                 Cardiac diagnostics:    Telemetry: Predominantly sinus rhythm, no malignant arrhythmias     CXR 9/15/24  There is interval improvement in consolidation right lower lobe consistent with improved right lower lobe pneumonia or atelectasis.  The remainder of the lungs is clear.  Heart size is within normal limits.  Mediastinum and elenita are   unremarkable.  Right internal jugular tunneled dialysis catheter has been removed.     EKG 9/15/2024:   Normal sinus rhythm  Minimal voltage criteria for LVH, may be normal variant ( Sokolow-Rojas )  Borderline ECG     Echo 3/10/24:  1. Left ventricle: The cavity size was normal. There is moderarte concentric      left ventricular hypertrophy Systolic function was normal. The estimated      ejection fraction was 55-60%, by visual assessment. Wall motion is      normal; there are no regional wall motion abnormalities.   2. Mitral valve: There was mild to moderate regurgitation. There is history      of annular ring repair The mean diastolic gradient was 2mm Hg.     Impression:  44 year old male presenting with SOB after missing HD session (last 5 days PTA)  Mild troponin elevation - chronic, does not reflect ACS  \"NSTEMI\" 4/23 with cath showing no sig CAD  H/o severe MR s/p MVR 1994, redo complex robotically assisted MVR ([place] 2022)  Chronic HFpEF  ESRD on HD  H/o DVT/PE 1/23 s/p Eliquis x 6 mo  Primary hyperaldosteronism  HTN - not controlled  H/o TIA  Bipolar disease  H/o cocaine use  H/o RLL PNA (8/8/24) treated with Abx, prednisone  Melena - C-scope, EGD no source, planned for capsule  endoscopy  Chronic anemia    Recommendations:  Issues with L AVF, vascular to evaluate   HD per renal for volume and BP management, currently controlled  Continue usual home antihypertensive regimen for now with IV prn   Not on ASA due to recent GIB    Thank you for allowing our practice to participate in the care of your patient. Please do not hesitate to contact me if you have any questions.    Micheal Campos MD  Interventional Cardiology  [facility]  Office: [phone]

## 2024-09-19 NOTE — PROGRESS NOTES
[facility]   part of [facility] Hospitalist Progress Note     Godwin Fonseca Patient Status:  Inpatient    1980 MRN [id number]   Location [facility] 7NE-A Attending Phong Freedman, DO   Hosp Day # 4 PCP Prieto Novak MD     Follow Up:  The primary encounter diagnosis was Stage 5 chronic kidney disease on chronic dialysis (HCC). Diagnoses of Abdominal pain, acute, Anemia, unspecified type, Acute on chronic congestive heart failure, unspecified heart failure type (HCC), Elevated troponin, and Hyperkalemia were also pertinent to this visit.    Subjective:     Patient seen and examined.  Yesterday had problems with HD access in L forearm AVF.  US of the AVF showed stenosis.  No new complaints today.      Objective:    Review of Systems:   10 point ROS completed and was negative, except for pertinent positive and negatives stated in subjective.    Vital signs:  Temp:  [97.6 °F (36.4 °C)-98.6 °F (37 °C)] 97.6 °F (36.4 °C)  Pulse:  [] 96  Resp:  [14-18] 16  BP: (100-129)/(60-86) 129/75  SpO2:  [93 %-98 %] 93 %    Physical Exam:    Gen: No acute distress, alert and oriented x3, no focal neurologic deficits  HEENT:  EOMI, PERRLA, OP clear, MMM  Pulm:Diminished at the bases , normal respiratory effort  CV: Tachy, reg, no murmur.  Normal PMI.    Abd: Abdomen soft, nontender, nondistended, no organomegaly, bowel sounds present  MSK: Full range of motion in extremities, no clubbing, no cyanosis  Skin: no rashes or lesions  Neuro:  Grossly intact, no focal deficits      Diagnostic Data:    Labs:  Recent Labs   Lab 09/15/24  1939 09/16/24  0645 24  0539   WBC 7.9 7.7 5.6   HGB 9.2* 9.3* 9.5*   MCV 95.5 96.9 94.7   .0 211.0 228.0   INR 1.11  --   --        Recent Labs   Lab 09/15/24  1939 09/16/24  0645 24  0539   GLU 98 101* 98   * 102* 51*   CREATSERUM 11.40* 10.05* 7.24*   CA 8.5 8.9 8.4*   ALB 3.5  --   --     140 139   K 5.6* 5.0 4.4     109 103   CO2 19.0* 18.0* 23.0   ALKPHO 66  --   --    AST 55*  --   --    ALT 25  --   --    BILT 0.6  --   --    TP 6.8  --   --        Estimated Creatinine Clearance: 14.8 mL/min (A) (based on SCr of 7.24 mg/dL (H)).    Recent Labs   Lab 09/15/24  1939   PTP 14.3   INR 1.11            COVID-19 Lab Results    COVID-19  Lab Results   Component Value Date    COVID19 Not Detected 09/15/2024    COVID19 Not Detected 08/11/2024    COVID19 Not Detected 05/15/2024       Pro-Calcitonin  No results for input(s): \"PCT\" in the last 168 hours.    Cardiac  Recent Labs   Lab 09/15/24  1939   PBNP 28,618*       Creatinine Kinase  No results for input(s): \"CK\" in the last 168 hours.    Inflammatory Markers  No results for input(s): \"CRP\", \"HARJEET\", \"LDH\", \"DDIMER\" in the last 168 hours.    Imaging: Imaging data reviewed in Epic.    Medications:    ARIPiprazole  15 mg Oral Daily    umeclidinium bromide  1 puff Inhalation Daily    amphetamine-dextroamphetamine  15 mg Oral BID AC    cloNIDine  0.1 mg Oral BID    buPROPion ER  150 mg Oral Daily    carvedilol  25 mg Oral BID with meals    fenofibrate micronized  134 mg Oral Nightly    FLUoxetine HCl  40 mg Oral Daily    fluticasone propionate  1 spray Each Nare BID    hydrALAZINE  50 mg Oral BID    isosorbide mononitrate ER  30 mg Oral Daily    lamoTRIgine  150 mg Oral Daily    levoFLOXacin  250 mg Oral Q48HR @ 0700    losartan  100 mg Oral Daily    NIFEdipine ER  60 mg Oral BID    sevelamer carbonate  800 mg Oral TID CC    tamsulosin  0.4 mg Oral Daily    heparin  5,000 Units Subcutaneous Q8H [facility]       Assessment & Plan:      44 yr old male with PMH sig for ESRD on HD (M/W/F), bipolar, depression, DM 2, HTN, DL, CAD, COPD, HFpEF, and chronic abdominal pain who presented to the ED for evaluation of shortness of breath.      # Stage 5 CKD with fluid overload   # Hyperkalemia   # Stenosis of LUE AVF  - suspect due to missed HD  - renal c/s  - HD per renal  - monitor K  - vascular  c/s, await further recs regarding management of LUE AVF     # Acute on chronic diastolic HF  # Elevated troponin   # CAD  - suspect demand ischemia due to missed HD  - trop down trending   - last TTE 3/10/24 with intact LVEF  - cards c/s appreciated      # Anemia of chronic disease   # Hx of GI bleeding  - pt following with GI as outpt   - plan outpt capsule study   - monitor hgb     # Chronic abd pain  # Opioid dependence   - follows with pain clinic as outpt   - resume home pain medications   - minimize IV narcotics      # Prostatitis  - cont po levaquin     # L calf pain  - await LLE venous dopplers     # Essential HTN  - BP elevated on admit  - resume home BP meds     # HLD  - cont statin     # COPD  - no evidence of acute exacerbation   - resume home inhalers      # Major depression, recurrent   # Bipolar d/o  - stable  - resume home medications     Plan of care: inpt care    Plan of care discussed with patient or family at bedside.    Phong Freedman, DO    Supplementary Documentation:     Quality:  DVT Prophylaxis: hep subq  CODE status: FULL  Abbott: no  Central line: no  If COVID testing is negative, may discontinue isolation: yes     Estimated date of discharge: TBD  Discharge is dependent on: clinical course and L AVF management  At this point Mr. Fonseca is expected to be discharge to: home    Plan of care discussed with pt

## 2024-09-19 NOTE — PLAN OF CARE
Assume care @ 2300  AO X4, Verbally responsive, RA  NSR on Tele  C/o of chronic ABD pain. PRN meds administered as ordered.   Continent. Up ad osvaldo  L fistula.   Heparin Sub Q  Good appetite meals.   Fall and safety precautions in place    Problem: NEUROLOGICAL - ADULT  Goal: Achieves stable or improved neurological status  Description: INTERVENTIONS  - Assess for and report changes in neurological status  - Initiate measures to prevent increased intracranial pressure  - Maintain blood pressure and fluid volume within ordered parameters to optimize cerebral perfusion and minimize risk of hemorrhage  - Monitor temperature, glucose, and sodium. Initiate appropriate interventions as ordered  Outcome: Progressing  Goal: Absence of seizures  Description: INTERVENTIONS  - Monitor for seizure activity  - Administer anti-seizure medications as ordered  - Monitor neurological status  Outcome: Progressing  Goal: Remains free of injury related to seizure activity  Description: INTERVENTIONS:  - Maintain airway, patient safety  and administer oxygen as ordered  - Monitor patient for seizure activity, document and report duration and description of seizure to MD/LIP  - If seizure occurs, turn patient to side and suction secretions as needed  - Reorient patient post seizure  - Seizure pads on all 4 side rails  - Instruct patient/family to notify RN of any seizure activity  - Instruct patient/family to call for assistance with activity based on assessment  Outcome: Progressing  Goal: Achieves maximal functionality and self care  Description: INTERVENTIONS  - Monitor swallowing and airway patency with patient fatigue and changes in neurological status  - Encourage and assist patient to increase activity and self care with guidance from PT/OT  - Encourage visually impaired, hearing impaired and aphasic patients to use assistive/communication devices  Outcome: Progressing     Problem: PAIN - ADULT  Goal: Verbalizes/displays adequate  comfort level or patient's stated pain goal  Description: INTERVENTIONS:  - Encourage pt to monitor pain and request assistance  - Assess pain using appropriate pain scale  - Administer analgesics based on type and severity of pain and evaluate response  - Implement non-pharmacological measures as appropriate and evaluate response  - Consider cultural and social influences on pain and pain management  - Manage/alleviate anxiety  - Utilize distraction and/or relaxation techniques  - Monitor for opioid side effects  - Notify MD/LIP if interventions unsuccessful or patient reports new pain  - Anticipate increased pain with activity and pre-medicate as appropriate  Outcome: Progressing

## 2024-09-19 NOTE — CONSULTS
Vascular Surgery Consultation    Godwin Fonseca Patient Status:  Inpatient    1980 MRN [id number]   Location [facility] 7NE-A Attending Phong Freedman, DO   Hosp Day # 4 PCP Prieto Novak MD     Reason for Consultation:      History of Present Illness:  Godwin Fonseca is a a(n) 44 year old male who presents with ***      Review of Imaging: ***    History:  Past Medical History:    Asthma ([facility])    Attention deficit hyperactivity disorder (ADHD)    Back problem    Bipolar 1 disorder (HCC)    CKD (chronic kidney disease) stage 3, GFR 30-59 ml/min (HCC)    Dr Meeks    Congestive heart disease ([facility])    COPD (chronic obstructive pulmonary disease) ([facility])    Coronary atherosclerosis    Deep vein thrombosis ([facility])    at age 17 R/T cast    Depression    Diabetes ([facility])    Essential hypertension    3/21 echo: severe concentric LVH with normal EF and no MR or pHTN    Extrinsic asthma, unspecified    Heart attack ([facility])    - angiogram- no intervention    Heart valve disease    mitral valve repair in /    High blood pressure    High cholesterol    History of blood transfusion    History of mitral valve repair    Hyperlipidemia    Low back pain    tight and stiff after sweeping and mopping    LVH (left ventricular hypertrophy)    Migraines    Mixed hyperlipidemia     HDL 38 LDL 97 VLDL 57     Monoclonal gammopathy    IgG kappa     MVP (mitral valve prolapse)    Repair  at [place]; echoes as recently as 3/21 show mild or trivial MR and no stenosis    Neuropathy    Osteoarthritis    hip ,knees    Pneumonia due to organism    Pulmonary embolism (HCC)    Renal disorder    Stroke (HCC)    TIA (transient ischemic attack)    Initial history of left-sided weakness and slurred speech. (+) cocaine. MRI of the brain, CT angiogram of the head and neck, and 2D echo are all unremarkable.     TMJ (dislocation of temporomandibular joint)    Troponin level elevated    Trop 60 60 47 with TIA and no CP:  Lexiscan negative with EF 51     Past Surgical History:   Procedure Laterality Date    Av fistula revision, open Left     Colonoscopy N/A 03/26/2023    Procedure: COLONOSCOPY;  Surgeon: Heath Vu MD;  Location:  ENDOSCOPY    Colonoscopy N/A 12/30/2023    Procedure: COLONOSCOPY with cold snare polypectomy and forcep polypectomy;  Surgeon: Ousmane Suarez MD;  Location:  ENDOSCOPY    Colonoscopy & polypectomy  2019    Egd  2019    Duodenitis. Biopsied. EUS for weight loss was negative    Heart surgery      Hernia surgery  08/17/2022    Dr Barnes    Laminectomy,>2 sgmt,lumbar  09/06/2018    L4-L5 Decomp Discectomy ROEM L4-L5    Mitralplasty w cp bypass  1994    [facility]: Repair    Repair rotator cuff,chronic Left     torn and had a ruptured bicep    Valve repair  1994    mitral valve     Family History   Problem Relation Age of Onset    Hypertension Father     Alcohol and Other Disorders Associated Father     Substance Abuse Father         cocaine    Dementia Father     Cancer Father         lung    Diabetes Mother     Cancer Mother         multiple myeloma    Hypertension Mother     Anxiety Maternal Aunt     Depression Maternal Aunt     Anxiety Maternal Aunt     Depression Maternal Aunt     Bipolar Disorder Maternal Aunt     Diabetes Maternal Grandmother     Hypertension Maternal Grandmother     Cancer Maternal Grandfather         stomach cancer    Diabetes Maternal Grandfather     Hypertension Maternal Grandfather     Alcohol and Other Disorders Associated Maternal Grandfather     Hypertension Paternal Grandmother     Hypertension Paternal Grandfather     Cancer Sister         uterine and ovarian    Hypertension Sister     Cancer Maternal Uncle         lung    Cancer Paternal Aunt         throat      reports that he quit smoking about 2 years ago. His smoking use included cigarettes. He started smoking about 29 years ago. He has a 27 pack-year smoking history. He has never been exposed to tobacco smoke. He  has never used smokeless tobacco. He reports that he does not drink alcohol and does not use drugs.    Allergies:  Allergies   Allergen Reactions    Hydrochlorothiazide RASH and HIVES       Medications:    Current Facility-Administered Medications:     ARIPiprazole (Abilify) tab 15 mg, 15 mg, Oral, Daily    albuterol (Ventolin HFA) 108 (90 Base) MCG/ACT inhaler 2 puff, 2 puff, Inhalation, Q6H PRN    umeclidinium bromide (Incruse Ellipta) 62.5 MCG/ACT inhaler 1 puff, 1 puff, Inhalation, Daily    hydrALAzine (Apresoline) 20 mg/mL injection 10 mg, 10 mg, Intravenous, Q6H PRN    amphetamine-dextroamphetamine (Adderall) tab 15 mg, 15 mg, Oral, BID AC    HYDROmorphone (Dilaudid) 1 MG/ML injection 0.5 mg, 0.5 mg, Intravenous, Q6H PRN    heparin (Porcine) 1000 UNIT/ML injection 1,500 Units, 1.5 mL, Intracatheter, PRN Dialysis    cloNIDine (Catapres) tab 0.1 mg, 0.1 mg, Oral, BID    buPROPion ER (Wellbutrin XL) 24 hr tab 150 mg, 150 mg, Oral, Daily    carvedilol (Coreg) tab 25 mg, 25 mg, Oral, BID with meals    dicyclomine (Bentyl) cap 10 mg, 10 mg, Oral, TID PRN    fenofibrate micronized (Lofibra) cap 134 mg, 134 mg, Oral, Nightly    FLUoxetine (PROzac) cap 40 mg, 40 mg, Oral, Daily    fluticasone propionate (Flonase) 50 MCG/ACT nasal suspension 1 spray, 1 spray, Each Nare, BID    hydrALAZINE (Apresoline) tab 50 mg, 50 mg, Oral, BID    isosorbide mononitrate ER (Imdur) 24 hr tab 30 mg, 30 mg, Oral, Daily    lamoTRIgine (LaMICtal) tab 150 mg, 150 mg, Oral, Daily    levoFLOXacin (Levaquin) tab 250 mg, 250 mg, Oral, Q48HR @ 0700    losartan (Cozaar) tab 100 mg, 100 mg, Oral, Daily    NIFEdipine ER (Procardia-XL) 24 hr tab 60 mg, 60 mg, Oral, BID    sevelamer carbonate (Renvela) tab 800 mg, 800 mg, Oral, TID CC    tamsulosin (Flomax) cap 0.4 mg, 0.4 mg, Oral, Daily    heparin (Porcine) 5000 UNIT/ML injection 5,000 Units, 5,000 Units, Subcutaneous, Q8H MARTHA    acetaminophen (Tylenol Extra Strength) tab 500 mg, 500 mg, Oral, Q4H  PRN    acetaminophen (Tylenol) tab 650 mg, 650 mg, Oral, Q4H PRN **OR** HYDROcodone-acetaminophen (Norco) 5-325 MG per tab 1 tablet, 1 tablet, Oral, Q4H PRN **OR** HYDROcodone-acetaminophen (Norco) 5-325 MG per tab 2 tablet, 2 tablet, Oral, Q4H PRN    polyethylene glycol (PEG 3350) (Miralax) 17 g oral packet 17 g, 17 g, Oral, Daily PRN    sennosides (Senokot) tab 17.2 mg, 17.2 mg, Oral, Nightly PRN    bisacodyl (Dulcolax) 10 MG rectal suppository 10 mg, 10 mg, Rectal, Daily PRN    ondansetron (Zofran) 4 MG/2ML injection 4 mg, 4 mg, Intravenous, Q6H PRN    prochlorperazine (Compazine) 10 MG/2ML injection 5 mg, 5 mg, Intravenous, Q8H PRN    Review of Systems: ***  CONSTITUTIONAL: denies fever, chills  ENT: denies sore throat, nasal drainage/congestion  CHEST/CVS: denies cough, sputum, trouble breathing/SOB, chest pain, RMASAY  GI: denies abdominal pain, heartburn, diarrhea, blood in bm, tarry bm, constipation,    : denies difficulty urinating, pain, blood in urine, or frequency  SKIN: denies any unusual skin lesions or rashes  MUSCULOSKELETAL: denies back pain, joint pain, leg swelling  NEURO/EYES: denies headaches, passing out, motor dysfunction, difficulty walking, difficulty with speech, temporary blindness, double vision, confusion    Physical Exam:  /82 (BP Location: Right arm)   Pulse 93   Temp 97.7 °F (36.5 °C) (Temporal)   Resp 16   Ht 6' 2\" (1.88 m)   Wt 245 lb (111.1 kg)   SpO2 94%   BMI 31.46 kg/m²   GENERAL: alert and orientated X 3, well developed, well nourished, in no apparent distress  HEENT: ears and throat are clear  NECK: supple, no lymphadenopathy, thyroid wnl  CAROTID: {Yes/No, Default No:1768::\"No\"} bruits  RESPIRATORY: no rales, rhonchi, or wheezes B  CARDIO: RRR without murmur, no murmur, no gallop   ABDOMEN: soft, non-tender with no palpable aneurysm or masses  BACK: normal, no tenderness  SKIN: no rashes, no suspicious lesions, warm and dry  EXTREMITIES: no edema, full range of  motion, no tenderness  NEURO/PSYCH: orientated x3, normal mood and affect, no sensory or motor deficit    ARTERIAL VASCULAR EXAM    LOWER EXTREMITY     FEMORAL POPLITEAL POST TIB ANT TIB PERONEAL   RIGHT {MY_LIST_SINGLE:3086::\"4\",\"3\",\"2\",\"1\",\"doppler\"} {MY_LIST_SINGLE:3086::\"4\",\"3\",\"2\",\"1\",\"doppler\"} {MY_LIST_SINGLE:3086::\"4\",\"3\",\"2\",\"1\",\"doppler\"} {MY_LIST_SINGLE:3086::\"4\",\"3\",\"2\",\"1\",\"doppler\"} {MY_LIST_SINGLE:3086::\"4\",\"3\",\"2\",\"1\",\"doppler\"}   LEFT {MY_LIST_SINGLE:3086::\"4\",\"3\",\"2\",\"1\",\"doppler\"} {MY_LIST_SINGLE:3086::\"4\",\"3\",\"2\",\"1\",\"doppler\"} {MY_LIST_SINGLE:3086::\"4\",\"3\",\"2\",\"1\",\"doppler\"} {MY_LIST_SINGLE:3086::\"4\",\"3\",\"2\",\"1\",\"doppler\"} {MY_LIST_SINGLE:3086::\"4\",\"3\",\"2\",\"1\",\"doppler\"}       ***    Laboratory Data:       Impression and Plan:    ***     Thank you for allowing me to participate in the care of your patient.    Albert Coombs MD  9/19/2024  1:42 PM

## 2024-09-19 NOTE — DIETARY NOTE
[facility]   part of [facility]    NUTRITION ASSESSMENT    Pt does not meet malnutrition criteria at this time.      NUTRITION INTERVENTION:    Meal and Snacks - Monitor and encourage adequate PO intake. Liberalized to 2 gm cardiac diet  Medical Food Supplements - Nepro BID vanilla. Rationale/use for oral supplements discussed.    PATIENT STATUS:   9/19: Pt stated he is feeling much better today and wants to go home. No N/V/D. Stated his appetite is getting better but still not great. Trying to eat 3x meals/day. Has not had BM in 2 days. Compliant with Nepro. Pt asked about how to get appetite back- discussed more frequent meals/snack and setting a timer as a reminder to eat. No complaints at this time, all questions answered.    09/16/24 44/M admitted d/t SOB, rectal bleeding, and abd pain. Pt screened d/t at risk consult and MST. Pt reported appetite being low for a few months,  only eating 1-2 meals/day. Stated he lost 10 lbs over the last few weeks with dry wt normally being 245-250 lbs. States he is experiencing N/D. Breakfast was delivered at time of visit. Offered Nepro BID and accepted. Pt diet liberalized to encourage more intake.     PMH: ESRD on HD (M/W/F), bipolar, depression, DM 2, HTN, DL, CAD, COPD, HF       ANTHROPOMETRICS:  Ht: 188 cm (6' 2\")  Wt: 111.1 kg (245 lb).   BMI: Body mass index is 31.46 kg/m².  IBW: 86.3 kg      WEIGHT HISTORY:   Weight loss: Pt reported 10 lb wt loss over the last few weeks, but states he is fluid overloaded right now.Dry wt normally 245-250 lbs.    Wt Readings from Last 10 Encounters:   09/16/24 111.1 kg (245 lb)   09/02/24 112.7 kg (248 lb 8 oz)   08/30/24 113.4 kg (250 lb)   08/11/24 108.9 kg (240 lb)   08/08/24 108.9 kg (240 lb)   06/25/24 108.9 kg (240 lb)   06/23/24 108.9 kg (240 lb)   06/16/24 111.8 kg (246 lb 7.6 oz)   05/14/24 108 kg (238 lb)   05/13/24 108 kg (238 lb)        NUTRITION:  Diet:       Procedures    Sodium restricted diet Sodium  Restriction: 2 GM NA; Is Patient on Accuchecks? No      Food Allergies: No  Cultural/Ethnic/Anabaptism Preferences Addressed: Yes    Percent Meals Eaten (last 3 days)       Date/Time Percent Meals Eaten (%)    09/16/24 1200 100 %    09/16/24 1900 100 %    09/19/24 0745 100 %            GI system review: WNL BM: 9/17  Skin and wounds: skin intact    NUTRITION RELATED PHYSICAL FINDINGS:     1. Body Fat/Muscle Mass: no wasting noted / well nourished     2. Fluid Accumulation:  fluid overloaded on HD      NUTRITION PRESCRIPTION: 86.3 kg (IBW)  Calories: 0068-6244 calories/day (25-30 kcal/kg)  Protein: 104-112 grams protein/day ( 1.2-1.3  grams protein per kg)  Fluid: ~1 ml/kcal or per MD discretion    NUTRITION DIAGNOSIS/PROBLEM:  Inadequate energy intake related to insufficient appetite resulting in inadequate nutrition intake as evidenced by documented/reported insufficient oral intake and documented/reported unintentional weight loss      MONITOR AND EVALUATE/NUTRITION GOALS:  PO intake of 75% of meals TID - Met, continues  PO intake of 75% of oral nutrition supplement/s - Met, continues  Achieve and maintain dry wt +/- 1 to 2 lbs - Ongoing      MEDICATIONS:  Flomax    LABS  BUN 51, Creatinine 7.24, GFR 9    Pt is at Low nutrition risk    James Titus  Dietetic Intern  [id number]

## 2024-09-20 ENCOUNTER — APPOINTMENT (OUTPATIENT)
Dept: INTERVENTIONAL RADIOLOGY/VASCULAR | Facility: HOSPITAL | Age: 44
End: 2024-09-20
Attending: STUDENT IN AN ORGANIZED HEALTH CARE EDUCATION/TRAINING PROGRAM
Payer: MEDICARE

## 2024-09-20 VITALS
HEIGHT: 74 IN | DIASTOLIC BLOOD PRESSURE: 87 MMHG | SYSTOLIC BLOOD PRESSURE: 138 MMHG | RESPIRATION RATE: 16 BRPM | HEART RATE: 97 BPM | TEMPERATURE: 98 F | WEIGHT: 245 LBS | BODY MASS INDEX: 31.44 KG/M2 | OXYGEN SATURATION: 98 %

## 2024-09-20 LAB
ANION GAP SERPL CALC-SCNC: 13 MMOL/L (ref 0–18)
BUN BLD-MCNC: 83 MG/DL (ref 9–23)
CALCIUM BLD-MCNC: 8.5 MG/DL (ref 8.7–10.4)
CHLORIDE SERPL-SCNC: 103 MMOL/L (ref 98–112)
CO2 SERPL-SCNC: 23 MMOL/L (ref 21–32)
CREAT BLD-MCNC: 10.94 MG/DL
EGFRCR SERPLBLD CKD-EPI 2021: 5 ML/MIN/1.73M2 (ref 60–?)
GLUCOSE BLD-MCNC: 103 MG/DL (ref 70–99)
OSMOLALITY SERPL CALC.SUM OF ELEC: 313 MOSM/KG (ref 275–295)
PHOSPHATE SERPL-MCNC: 8.3 MG/DL (ref 2.4–5.1)
POTASSIUM SERPL-SCNC: 4.6 MMOL/L (ref 3.5–5.1)
SODIUM SERPL-SCNC: 139 MMOL/L (ref 136–145)

## 2024-09-20 PROCEDURE — 77001 FLUOROGUIDE FOR VEIN DEVICE: CPT | Performed by: RADIOLOGY

## 2024-09-20 PROCEDURE — 90935 HEMODIALYSIS ONE EVALUATION: CPT | Performed by: STUDENT IN AN ORGANIZED HEALTH CARE EDUCATION/TRAINING PROGRAM

## 2024-09-20 PROCEDURE — 76937 US GUIDE VASCULAR ACCESS: CPT | Performed by: RADIOLOGY

## 2024-09-20 PROCEDURE — 0JH63XZ INSERTION OF TUNNELED VASCULAR ACCESS DEVICE INTO CHEST SUBCUTANEOUS TISSUE AND FASCIA, PERCUTANEOUS APPROACH: ICD-10-PCS | Performed by: RADIOLOGY

## 2024-09-20 PROCEDURE — 84100 ASSAY OF PHOSPHORUS: CPT | Performed by: STUDENT IN AN ORGANIZED HEALTH CARE EDUCATION/TRAINING PROGRAM

## 2024-09-20 PROCEDURE — 99153 MOD SED SAME PHYS/QHP EA: CPT | Performed by: RADIOLOGY

## 2024-09-20 PROCEDURE — 36558 INSERT TUNNELED CV CATH: CPT | Performed by: RADIOLOGY

## 2024-09-20 PROCEDURE — 02HV33Z INSERTION OF INFUSION DEVICE INTO SUPERIOR VENA CAVA, PERCUTANEOUS APPROACH: ICD-10-PCS | Performed by: RADIOLOGY

## 2024-09-20 PROCEDURE — 99152 MOD SED SAME PHYS/QHP 5/>YRS: CPT | Performed by: RADIOLOGY

## 2024-09-20 PROCEDURE — 80048 BASIC METABOLIC PNL TOTAL CA: CPT | Performed by: STUDENT IN AN ORGANIZED HEALTH CARE EDUCATION/TRAINING PROGRAM

## 2024-09-20 RX ORDER — LIDOCAINE HYDROCHLORIDE 10 MG/ML
INJECTION, SOLUTION INFILTRATION; PERINEURAL
Status: COMPLETED
Start: 2024-09-20 | End: 2024-09-20

## 2024-09-20 RX ORDER — MIDAZOLAM HYDROCHLORIDE 1 MG/ML
INJECTION INTRAMUSCULAR; INTRAVENOUS
Status: COMPLETED
Start: 2024-09-20 | End: 2024-09-20

## 2024-09-20 RX ORDER — HYDROCODONE BITARTRATE AND ACETAMINOPHEN 10; 325 MG/1; MG/1
1 TABLET ORAL EVERY 6 HOURS PRN
Qty: 20 TABLET | Refills: 0 | Status: ON HOLD | OUTPATIENT
Start: 2024-09-20

## 2024-09-20 RX ORDER — HEPARIN SODIUM 5000 [USP'U]/ML
INJECTION, SOLUTION INTRAVENOUS; SUBCUTANEOUS
Status: COMPLETED
Start: 2024-09-20 | End: 2024-09-20

## 2024-09-20 RX ORDER — LIDOCAINE HYDROCHLORIDE AND EPINEPHRINE BITARTRATE 20; .01 MG/ML; MG/ML
INJECTION, SOLUTION SUBCUTANEOUS
Status: COMPLETED
Start: 2024-09-20 | End: 2024-09-20

## 2024-09-20 RX ORDER — CEFAZOLIN SODIUM 1 G/3ML
INJECTION, POWDER, FOR SOLUTION INTRAMUSCULAR; INTRAVENOUS
Status: COMPLETED
Start: 2024-09-20 | End: 2024-09-20

## 2024-09-20 NOTE — PLAN OF CARE
Assumed care at 0700  Alert and oriented x4   NSR/ST on tele and on RA  Pt dialysis catheter placed and dialysis completed with 3.5L taken out per dialysis nurse.  Pain in abd, dilaudid & norco given  Discharge paperwork reviewed IV removed, tele removed. Pt discharged home via wheelchair.  Will follow up with vascular surgeon

## 2024-09-20 NOTE — PAYOR COMM NOTE
CONTINUED STAY REVIEW    Payor: [employer]  Subscriber #:  [id number]  Authorization Number: [id number]    Admit date: 9/15/24  Admit time: 11:02 PM    REVIEW DOCUMENTATION: 9/18-9/19 9/18/2024 Hospitalist Progress Note    Follow Up:  The primary encounter diagnosis was Stage 5 chronic kidney disease on chronic dialysis (HCC). Diagnoses of Abdominal pain, acute, Anemia, unspecified type, Acute on chronic congestive heart failure, unspecified heart failure type (HCC), Elevated troponin, and Hyperkalemia were also pertinent to this visit.        Subjective:  Patient seen and examined.   Having issues with HD access .  Still c/o lower abd pain and L calf pain while walking.  No F/C, N/V.            Objective:  Review of Systems:   10 point ROS completed and was negative, except for pertinent positive and negatives stated in subjective.     Vital signs:  Temp:  [97.4 °F (36.3 °C)-98.5 °F (36.9 °C)] 98 °F (36.7 °C)  Pulse:  [] 98  Resp:  [14-20] 14  BP: ()/(63-88) 123/88  SpO2:  [90 %-97 %] 97 %     Physical Exam:    Gen: No acute distress, alert and oriented x3, no focal neurologic deficits  HEENT:  EOMI, PERRLA, OP clear, MMM  Pulm:Diminished at the bases , normal respiratory effort  CV: Tachy, reg, no murmur.  Normal PMI.    Abd: Abdomen soft, nontender, nondistended, no organomegaly, bowel sounds present  MSK: Full range of motion in extremities, no clubbing, no cyanosis  Skin: no rashes or lesions  Neuro:  Grossly intact, no focal deficits        Diagnostic Data:    Labs:        Recent Labs   Lab 09/15/24  1939 09/16/24  0645 09/17/24  0539   WBC 7.9 7.7 5.6   HGB 9.2* 9.3* 9.5*   MCV 95.5 96.9 94.7   .0 211.0 228.0   INR 1.11  --   --                Recent Labs   Lab 09/15/24  1939 09/16/24  0645 09/17/24  0539   GLU 98 101* 98   * 102* 51*   CREATSERUM 11.40* 10.05* 7.24*   CA 8.5 8.9 8.4*   ALB 3.5  --   --     140 139   K 5.6* 5.0 4.4    109 103   CO2  19.0* 18.0* 23.0   ALKPHO 66  --   --    AST 55*  --   --    ALT 25  --   --    BILT 0.6  --   --    TP 6.8  --   --          Estimated Creatinine Clearance: 14.8 mL/min (A) (based on SCr of 7.24 mg/dL (H)).         Recent Labs   Lab 09/15/24  1939   PTP 14.3   INR 1.11            COVID-19 Lab Results     COVID-19        Lab Results   Component Value Date     COVID19 Not Detected 09/15/2024     COVID19 Not Detected 08/11/2024     COVID19 Not Detected 05/15/2024         Pro-Calcitonin  No results for input(s): \"PCT\" in the last 168 hours.     Cardiac      Recent Labs   Lab 09/15/24  1939   PBNP 28,618*         Creatinine Kinase  No results for input(s): \"CK\" in the last 168 hours.     Inflammatory Markers  No results for input(s): \"CRP\", \"HARJEET\", \"LDH\", \"DDIMER\" in the last 168 hours.     Imaging: Imaging data reviewed in Epic.     Medications:   Scheduled Medications    ARIPiprazole  15 mg Oral Daily    umeclidinium bromide  1 puff Inhalation Daily    amphetamine-dextroamphetamine  15 mg Oral BID AC    cloNIDine  0.1 mg Oral BID    buPROPion ER  150 mg Oral Daily    carvedilol  25 mg Oral BID with meals    fenofibrate micronized  134 mg Oral Nightly    FLUoxetine HCl  40 mg Oral Daily    fluticasone propionate  1 spray Each Nare BID    hydrALAZINE  50 mg Oral BID    isosorbide mononitrate ER  30 mg Oral Daily    lamoTRIgine  150 mg Oral Daily    levoFLOXacin  250 mg Oral Q48HR @ 0700    losartan  100 mg Oral Daily    NIFEdipine ER  60 mg Oral BID    sevelamer carbonate  800 mg Oral TID CC    tamsulosin  0.4 mg Oral Daily    heparin  5,000 Units Subcutaneous Q8H [facility]                  Assessment & Plan:  44 yr old male with PMH sig for ESRD on HD (M/W/F), bipolar, depression, DM 2, HTN, DL, CAD, COPD, HFpEF, and chronic abdominal pain who presented to the ED for evaluation of shortness of breath.      # Stage 5 CKD with fluid overload   # Hyperkalemia   - suspect due to missed HD  - renal c/s  - HD per renal  - monitor  K     # Acute on chronic diastolic HF  # Elevated troponin   # CAD  - suspect demand ischemia due to missed HD  - trop down trending   - last TTE 3/10/24 with intact LVEF  - cards c/s appreciated      # Anemia of chronic disease   # Hx of GI bleeding  - pt following with GI as outpt   - plan outpt capsule study   - monitor hgb     # Chronic abd pain  # Opioid dependence   - follows with pain clinic as outpt   - resume home pain medications   - minimize IV narcotics      # Prostatitis  - cont po levaquin      # L calf pain  - await LLE venous dopplers      # Essential HTN  - BP elevated on admit  - resume home BP meds     # HLD  - cont statin     # COPD  - no evidence of acute exacerbation   - resume home inhalers      # Major depression, recurrent   # Bipolar d/o  - stable  - resume home medications      Plan of care: inpt care     Plan of care discussed with patient or family at bedside.     Phong Freedman, DO     9/18/2024 Nephrology Progress Note    SUBJECTIVE:      Unable to perform HD today due to issues with access.      MEDICATIONS:      Current Hospital Medications          Current Facility-Administered Medications   Medication Dose Route Frequency    ARIPiprazole (Abilify) tab 15 mg  15 mg Oral Daily    albuterol (Ventolin HFA) 108 (90 Base) MCG/ACT inhaler 2 puff  2 puff Inhalation Q6H PRN    umeclidinium bromide (Incruse Ellipta) 62.5 MCG/ACT inhaler 1 puff  1 puff Inhalation Daily    hydrALAzine (Apresoline) 20 mg/mL injection 10 mg  10 mg Intravenous Q6H PRN    amphetamine-dextroamphetamine (Adderall) tab 15 mg  15 mg Oral BID AC    HYDROmorphone (Dilaudid) 1 MG/ML injection 0.5 mg  0.5 mg Intravenous Q6H PRN    heparin (Porcine) 1000 UNIT/ML injection 1,500 Units  1.5 mL Intracatheter PRN D[facility]    cloNIDine (Catapres) tab 0.1 mg  0.1 mg Oral BID    buPROPion ER (Wellbutrin XL) 24 hr tab 150 mg  150 mg Oral Daily    carvedilol (Coreg) tab 25 mg  25 mg Oral BID with meals    dicyclomine (Bentyl)  cap 10 mg  10 mg Oral TID PRN    fenofibrate micronized (Lofibra) cap 134 mg  134 mg Oral Nightly    FLUoxetine (PROzac) cap 40 mg  40 mg Oral Daily    fluticasone propionate (Flonase) 50 MCG/ACT nasal suspension 1 spray  1 spray Each Nare BID    hydrALAZINE (Apresoline) tab 50 mg  50 mg Oral BID    isosorbide mononitrate ER (Imdur) 24 hr tab 30 mg  30 mg Oral Daily    lamoTRIgine (LaMICtal) tab 150 mg  150 mg Oral Daily    levoFLOXacin (Levaquin) tab 250 mg  250 mg Oral Q48HR @ 0700    losartan (Cozaar) tab 100 mg  100 mg Oral Daily    NIFEdipine ER (Procardia-XL) 24 hr tab 60 mg  60 mg Oral BID    sevelamer carbonate (Renvela) tab 800 mg  800 mg Oral TID CC    tamsulosin (Flomax) cap 0.4 mg  0.4 mg Oral Daily    heparin (Porcine) 5000 UNIT/ML injection 5,000 Units  5,000 Units Subcutaneous Q8H [facility]    acetaminophen (Tylenol Extra Strength) tab 500 mg  500 mg Oral Q4H PRN    acetaminophen (Tylenol) tab 650 mg  650 mg Oral Q4H PRN     Or    HYDROcodone-acetaminophen (Norco) 5-325 MG per tab 1 tablet  1 tablet Oral Q4H PRN     Or    HYDROcodone-acetaminophen (Norco) 5-325 MG per tab 2 tablet  2 tablet Oral Q4H PRN    polyethylene glycol (PEG 3350) (Miralax) 17 g oral packet 17 g  17 g Oral Daily PRN    sennosides (Senokot) tab 17.2 mg  17.2 mg Oral Nightly PRN    bisacodyl (Dulcolax) 10 MG rectal suppository 10 mg  10 mg Rectal Daily PRN    ondansetron (Zofran) 4 MG/2ML injection 4 mg  4 mg Intravenous Q6H PRN    prochlorperazine (Compazine) 10 MG/2ML injection 5 mg  5 mg Intravenous Q8H PRN            PHYSICAL EXAM:      Vital Signs: /88 (BP Location: Right arm)   Pulse 98   Temp 98 °F (36.7 °C) (Oral)   Resp 14   Ht 6' 2\" (1.88 m)   Wt 245 lb (111.1 kg)   SpO2 97%   BMI 31.46 kg/m²   Temp (24hrs), Av.2 °F (36.8 °C), Min:97.4 °F (36.3 °C), Max:98.5 °F (36.9 °C)        Intake/Output Summary (Last 24 hours) at 2024 1230  Last data filed at 2024 1615      Gross per 24 hour   Intake --   Output  3000 ml   Net -3000 ml          Wt Readings from Last 3 Encounters:   09/16/24 245 lb (111.1 kg)   09/02/24 248 lb 8 oz (112.7 kg)   08/30/24 250 lb (113.4 kg)         General: no acute distress  HEENT: normocephalic, atraumatic  CV: RRR  Respiratory: no distress  Abdomen: soft, non-tender  Extremities: no edema bilaterally  Skin: warm, dry  Neuro: awake, alert     LABORATORY DATA:            Lab Results   Component Value Date     GLU 98 09/17/2024     BUN 51 (H) 09/17/2024     BUNCREA 13.0 03/07/2022     CREATSERUM 7.24 (H) 09/17/2024     ANIONGAP 13 09/17/2024     GFR 59 (L) 01/04/2018     GFRNAA 30 (L) 07/12/2022     GFRAA 35 (L) 07/12/2022     CA 8.4 (L) 09/17/2024     OSMOCALC 302 (H) 09/17/2024     ALKPHO 66 09/15/2024     AST 55 (H) 09/15/2024     ALT 25 09/15/2024     BILT 0.6 09/15/2024     TP 6.8 09/15/2024     ALB 3.5 09/15/2024     GLOBULIN 3.3 09/15/2024      09/17/2024     K 4.4 09/17/2024      09/17/2024     CO2 23.0 09/17/2024            Lab Results   Component Value Date     WBC 5.6 09/17/2024     RBC 3.02 (L) 09/17/2024     HGB 9.5 (L) 09/17/2024     HCT 28.6 (L) 09/17/2024     .0 09/17/2024     MPV 11.5 12/14/2012     MCV 94.7 09/17/2024     MCH 31.5 09/17/2024     MCHC 33.2 09/17/2024     RDW 15.1 09/17/2024     NEPRELIM 3.52 09/17/2024     NEPERCENT 63.4 09/17/2024     LYPERCENT 20.4 09/17/2024     MOPERCENT 11.5 09/17/2024     EOPERCENT 3.2 09/17/2024     BAPERCENT 1.3 09/17/2024     NE 3.52 09/17/2024     LYMABS 1.13 09/17/2024     MOABSO 0.64 09/17/2024     EOABSO 0.18 09/17/2024     BAABSO 0.07 09/17/2024            Lab Results   Component Value Date     MALBP 167.00 05/24/2023     CREUR 142.00 05/24/2023     CREUR 142.00 05/24/2023            Lab Results   Component Value Date     COLORUR Colorless (A) 09/16/2024     CLARITY Clear 09/16/2024     SPECGRAVITY 1.012 09/16/2024     GLUUR Normal 09/16/2024     BILUR Negative 09/16/2024     KETUR Negative 09/16/2024      BLOODURINE 3+ (A) 09/16/2024     PHURINE 6.0 09/16/2024     PROUR 50 (A) 09/16/2024     UROBILINOGEN Normal 09/16/2024     NITRITE Negative 09/16/2024     LEUUR Negative 09/16/2024     WBCUR 1-5 09/16/2024     RBCUR 0-2 09/16/2024     EPIUR Few (A) 09/16/2024     BACUR None Seen 09/16/2024     CAOXUR Occasional (A) 04/20/2018     HYLUR Present (A) 05/14/2019         IMAGING:      Reviewed     ASSESSMENT:      # ESRD on HD  -MWF schedule outpatient  -[facility]  -L AVF      # HTN/Volume Status     # Hyperkalemia     # Metabolic acidosis     # Anemia     # Secondary Hyperparathyroidism        PLAN:      -Issues with L AVF - feel it is not mature yet (placed about 6-8 weeks ago??), may also have small hematoma. Likely needs rest. Would have vascular surgery evaluate and if needs rest, then will plan for tunnel catheter placement.  -HD per MWF schedule - will reschedule today HD to tomorrow pending vascular surgery recommendations  -UF with HD as tolerated  -Continue home BP medications - change clonidine to 0.1mg BID to avoid rebound  -Defer OWEN in setting of high BPs  -Continue sevelamer, will check phosphorus levels  -Avoid nephrotoxins and renally dose medications for creatinine clearance  -Monitor intake and output daily  -Daily weights        We will continue to follow     Thank you for allowing us to participate in the care of this patient.         [facility] - Nephrology                       9/19/2024 Hospitalist Progress Note    Follow Up:  The primary encounter diagnosis was Stage 5 chronic kidney disease on chronic dialysis (HCC). Diagnoses of Abdominal pain, acute, Anemia, unspecified type, Acute on chronic congestive heart failure, unspecified heart failure type (HCC), Elevated troponin, and Hyperkalemia were also pertinent to this visit.        Subjective:  Patient seen and examined.  Yesterday had problems with HD access in L forearm AVF.  US of the AVF showed  stenosis.  No new complaints today.             Objective:  Review of Systems:   10 point ROS completed and was negative, except for pertinent positive and negatives stated in subjective.     Vital signs:  Temp:  [97.6 °F (36.4 °C)-98.6 °F (37 °C)] 97.6 °F (36.4 °C)  Pulse:  [] 96  Resp:  [14-18] 16  BP: (100-129)/(60-86) 129/75  SpO2:  [93 %-98 %] 93 %     Physical Exam:    Gen: No acute distress, alert and oriented x3, no focal neurologic deficits  HEENT:  EOMI, PERRLA, OP clear, MMM  Pulm:Diminished at the bases , normal respiratory effort  CV: Tachy, reg, no murmur.  Normal PMI.    Abd: Abdomen soft, nontender, nondistended, no organomegaly, bowel sounds present  MSK: Full range of motion in extremities, no clubbing, no cyanosis  Skin: no rashes or lesions  Neuro:  Grossly intact, no focal deficits        Diagnostic Data:    Labs:        Recent Labs   Lab 09/15/24  1939 09/16/24  0645 09/17/24  0539   WBC 7.9 7.7 5.6   HGB 9.2* 9.3* 9.5*   MCV 95.5 96.9 94.7   .0 211.0 228.0   INR 1.11  --   --                Recent Labs   Lab 09/15/24  1939 09/16/24  0645 09/17/24  0539   GLU 98 101* 98   * 102* 51*   CREATSERUM 11.40* 10.05* 7.24*   CA 8.5 8.9 8.4*   ALB 3.5  --   --     140 139   K 5.6* 5.0 4.4    109 103   CO2 19.0* 18.0* 23.0   ALKPHO 66  --   --    AST 55*  --   --    ALT 25  --   --    BILT 0.6  --   --    TP 6.8  --   --          Estimated Creatinine Clearance: 14.8 mL/min (A) (based on SCr of 7.24 mg/dL (H)).         Recent Labs   Lab 09/15/24  1939   PTP 14.3   INR 1.11            COVID-19 Lab Results     COVID-19        Lab Results   Component Value Date     COVID19 Not Detected 09/15/2024     COVID19 Not Detected 08/11/2024     COVID19 Not Detected 05/15/2024         Pro-Calcitonin  No results for input(s): \"PCT\" in the last 168 hours.     Cardiac      Recent Labs   Lab 09/15/24  1939   PBNP 28,618*         Creatinine Kinase  No results for input(s): \"CK\" in the last  168 hours.     Inflammatory Markers  No results for input(s): \"CRP\", \"HARJEET\", \"LDH\", \"DDIMER\" in the last 168 hours.     Imaging: Imaging data reviewed in Epic.     Medications:   Scheduled Medications    ARIPiprazole  15 mg Oral Daily    umeclidinium bromide  1 puff Inhalation Daily    amphetamine-dextroamphetamine  15 mg Oral BID AC    cloNIDine  0.1 mg Oral BID    buPROPion ER  150 mg Oral Daily    carvedilol  25 mg Oral BID with meals    fenofibrate micronized  134 mg Oral Nightly    FLUoxetine HCl  40 mg Oral Daily    fluticasone propionate  1 spray Each Nare BID    hydrALAZINE  50 mg Oral BID    isosorbide mononitrate ER  30 mg Oral Daily    lamoTRIgine  150 mg Oral Daily    levoFLOXacin  250 mg Oral Q48HR @ 0700    losartan  100 mg Oral Daily    NIFEdipine ER  60 mg Oral BID    sevelamer carbonate  800 mg Oral TID CC    tamsulosin  0.4 mg Oral Daily    heparin  5,000 Units Subcutaneous Q8H [facility]                  Assessment & Plan:  44 yr old male with PMH sig for ESRD on HD (M/W/F), bipolar, depression, DM 2, HTN, DL, CAD, COPD, HFpEF, and chronic abdominal pain who presented to the ED for evaluation of shortness of breath.      # Stage 5 CKD with fluid overload   # Hyperkalemia   # Stenosis of LUE AVF  - suspect due to missed HD  - renal c/s  - HD per renal  - monitor K  - vascular c/s, await further recs regarding management of LUE AVF     # Acute on chronic diastolic HF  # Elevated troponin   # CAD  - suspect demand ischemia due to missed HD  - trop down trending   - last TTE 3/10/24 with intact LVEF  - cards c/s appreciated      # Anemia of chronic disease   # Hx of GI bleeding  - pt following with GI as outpt   - plan outpt capsule study   - monitor hgb     # Chronic abd pain  # Opioid dependence   - follows with pain clinic as outpt   - resume home pain medications   - minimize IV narcotics      # Prostatitis  - cont po levaquin      # L calf pain  - await LLE venous dopplers      # Essential HTN  - BP  elevated on admit  - resume home BP meds     # HLD  - cont statin     # COPD  - no evidence of acute exacerbation   - resume home inhalers      # Major depression, recurrent   # Bipolar d/o  - stable  - resume home medications      Plan of care: inpt care     Plan of care discussed with patient or family at bedside.     Phong Freedman,            MEDICATIONS ADMINISTERED IN LAST 1 DAY:  buPROPion ER (Wellbutrin XL) 24 hr tab 150 mg       Date Action Dose Route User    9/19/2024 1032 Given 150 mg Oral Viktoriya Johnson RN          carvedilol (Coreg) tab 25 mg       Date Action Dose Route User    9/19/2024 1809 Given 25 mg Oral Viktoriya Johnson RN    9/19/2024 1056 Given 25 mg Oral Viktoriya Johnson RN          cloNIDine (Catapres) tab 0.1 mg       Date Action Dose Route User    9/19/2024 2019 Given 0.1 mg Oral Julia Joel RN    9/19/2024 1032 Given 0.1 mg Oral Viktoriya Johnson RN          fenofibrate micronized (Lofibra) cap 134 mg       Date Action Dose Route User    9/19/2024 2019 Given 134 mg Oral Julia Joel RN          FLUoxetine (PROzac) cap 40 mg       Date Action Dose Route User    9/19/2024 1032 Given 40 mg Oral Viktoriya Johnson RN          fluticasone propionate (Flonase) 50 MCG/ACT nasal suspension 1 spray       Date Action Dose Route User    9/19/2024 1037 Given 1 spray Each Nare Viktoriya Johnson RN          heparin (Porcine) 5000 UNIT/ML injection 5,000 Units       Date Action Dose Route User    9/19/2024 1511 Given 5,000 Units Subcutaneous (Left Lower Abdomen) Viktoriya Johnson RN          hydrALAZINE (Apresoline) tab 50 mg       Date Action Dose Route User    9/19/2024 2019 Given 50 mg Oral Julia Joel RN    9/19/2024 1032 Given 50 mg Oral Viktoriya Johnson RN          HYDROcodone-acetaminophen (Norco) 5-325 MG per tab 2 tablet       Date Action Dose Route User    9/19/2024 1552 Given 2 tablet Oral Eusebio Echeverria RN          HYDROmorphone (Dilaudid) 1 MG/ML injection 0.5  mg       Date Action Dose Route User    9/20/2024 0558 Given 0.5 mg Intravenous Julia Joel RN    9/20/2024 0045 Given 0.5 mg Intravenous Julia Joel RN    9/19/2024 1809 Given 0.5 mg Intravenous Viktoriya Johnson RN    9/19/2024 1050 Given 0.5 mg Intravenous Viktoriya Johnson RN          isosorbide mononitrate ER (Imdur) 24 hr tab 30 mg       Date Action Dose Route User    9/19/2024 1032 Given 30 mg Oral Viktoriya Johnson RN          losartan (Cozaar) tab 100 mg       Date Action Dose Route User    9/19/2024 1032 Given 100 mg Oral Viktoriya Johnson RN          NIFEdipine ER (Procardia-XL) 24 hr tab 60 mg       Date Action Dose Route User    9/19/2024 2019 Given 60 mg Oral Julia Joel RN    9/19/2024 1032 Given 60 mg Oral Viktoriya Johnson RN          ondansetron (Zofran) 4 MG/2ML injection 4 mg       Date Action Dose Route User    9/20/2024 0558 Given 4 mg Intravenous Julia Joel RN          sevelamer carbonate (Renvela) tab 800 mg       Date Action Dose Route User    9/19/2024 1809 Given 800 mg Oral Viktoriya Johnson RN    9/19/2024 1207 Given 800 mg Oral Viktoriya Johnson RN          tamsulosin (Flomax) cap 0.4 mg       Date Action Dose Route User    9/19/2024 1032 Given 0.4 mg Oral Viktoriya Johnson RN          umeclidinium bromide (Incruse Ellipta) 62.5 MCG/ACT inhaler 1 puff       Date Action Dose Route User    9/19/2024 1033 Given 1 puff Inhalation Viktoriya Johnson RN          lamoTRIgine (LaMICtal) tab 150 mg       Date Action Dose Route User    9/19/2024 1032 Given 150 mg Oral Viktoriya Johnson RN          ARIPiprazole (Abilify) tab 15 mg       Date Action Dose Route User    9/19/2024 1033 Given 15 mg Oral Viktoriya Johnson RN          amphetamine-dextroamphetamine (Adderall) tab 15 mg       Date Action Dose Route User    9/19/2024 5656 Given 15 mg Oral Viktoriya Johnson, RN            Vitals (last day)       Date/Time Temp Pulse Resp BP SpO2 Weight O2 Device O2 Flow Rate (L/min)  [facility]    09/20/24 0400 97.9 °F (36.6 °C) 97 -- 105/74 -- -- None (Room air) --     09/20/24 0000 97.8 °F (36.6 °C) 93 -- 119/67 -- -- None (Room air) --     09/19/24 2000 97.8 °F (36.6 °C) 91 -- 120/85 -- -- None (Room air) --     09/19/24 1115 97.7 °F (36.5 °C) 93 16 123/82 94 % -- None (Room air) -- NM    09/19/24 0745 97.6 °F (36.4 °C) 96 16 129/75 93 % -- None (Room air) -- NM    09/19/24 0400 98.5 °F (36.9 °C) 89 18 109/75 98 % -- None (Room air) -- EDIN

## 2024-09-20 NOTE — PROCEDURES
[facility]   part of [facility]  Procedure Note    Godwin Fonseca Patient Status:  Inpatient    1980 MRN [id number]   Location [facility] INTERVENTIONAL SUITES Attending Phong Freedman, DO   Hosp Day # 5 PCP Prieto Novak MD     Procedure: Tunneled dialysis catheter placement    Pre-Procedure Diagnosis:  ESRD    Post-Procedure Diagnosis: Same    Anesthesia:  Sedation    Findings:  Patent right external jugular vein. Internal jugular vein is occluded    Specimens: None    Blood Loss:  < 5 cc    Tourniquet Time: None  Complications:  None  Drains:  None    Secondary Diagnosis:  n/A    Quinn Bernal MD  2024

## 2024-09-20 NOTE — PROGRESS NOTES
[facility] Nephrology  Inpatient Follow-up    Godwin Fonseca Patient Status:  Inpatient    1980 MRN [id number]   formerly [facility] 7NE-A Attending Phong Freedman, DO   Hosp Day # 5 PCP Prieto Novak MD       SUBJECTIVE:     TDC today, dialysis today.     MEDICATIONS:     Current Facility-Administered Medications   Medication Dose Route Frequency    ARIPiprazole (Abilify) tab 15 mg  15 mg Oral Daily    albuterol (Ventolin HFA) 108 (90 Base) MCG/ACT inhaler 2 puff  2 puff Inhalation Q6H PRN    umeclidinium bromide (Incruse Ellipta) 62.5 MCG/ACT inhaler 1 puff  1 puff Inhalation Daily    hydrALAzine (Apresoline) 20 mg/mL injection 10 mg  10 mg Intravenous Q6H PRN    amphetamine-dextroamphetamine (Adderall) tab 15 mg  15 mg Oral BID AC    HYDROmorphone (Dilaudid) 1 MG/ML injection 0.5 mg  0.5 mg Intravenous Q6H PRN    heparin (Porcine) 1000 UNIT/ML injection 1,500 Units  1.5 mL Intracatheter PRN Dialysis    cloNIDine (Catapres) tab 0.1 mg  0.1 mg Oral BID    buPROPion ER (Wellbutrin XL) 24 hr tab 150 mg  150 mg Oral Daily    carvedilol (Coreg) tab 25 mg  25 mg Oral BID with meals    dicyclomine (Bentyl) cap 10 mg  10 mg Oral TID PRN    fenofibrate micronized (Lofibra) cap 134 mg  134 mg Oral Nightly    FLUoxetine (PROzac) cap 40 mg  40 mg Oral Daily    fluticasone propionate (Flonase) 50 MCG/ACT nasal suspension 1 spray  1 spray Each Nare BID    hydrALAZINE (Apresoline) tab 50 mg  50 mg Oral BID    isosorbide mononitrate ER (Imdur) 24 hr tab 30 mg  30 mg Oral Daily    lamoTRIgine (LaMICtal) tab 150 mg  150 mg Oral Daily    levoFLOXacin (Levaquin) tab 250 mg  250 mg Oral Q48HR @ 0700    losartan (Cozaar) tab 100 mg  100 mg Oral Daily    NIFEdipine ER (Procardia-XL) 24 hr tab 60 mg  60 mg Oral BID    sevelamer carbonate (Renvela) tab 800 mg  800 mg Oral TID CC    tamsulosin (Flomax) cap 0.4 mg  0.4 mg Oral Daily    [Held by provider] heparin (Porcine) 5000 UNIT/ML injection 5,000 Units   5,000 Units Subcutaneous Q8H MARTHA    acetaminophen (Tylenol Extra Strength) tab 500 mg  500 mg Oral Q4H PRN    acetaminophen (Tylenol) tab 650 mg  650 mg Oral Q4H PRN    Or    HYDROcodone-acetaminophen (Norco) 5-325 MG per tab 1 tablet  1 tablet Oral Q4H PRN    Or    HYDROcodone-acetaminophen (Norco) 5-325 MG per tab 2 tablet  2 tablet Oral Q4H PRN    polyethylene glycol (PEG 3350) (Miralax) 17 g oral packet 17 g  17 g Oral Daily PRN    sennosides (Senokot) tab 17.2 mg  17.2 mg Oral Nightly PRN    bisacodyl (Dulcolax) 10 MG rectal suppository 10 mg  10 mg Rectal Daily PRN    ondansetron (Zofran) 4 MG/2ML injection 4 mg  4 mg Intravenous Q6H PRN    prochlorperazine (Compazine) 10 MG/2ML injection 5 mg  5 mg Intravenous Q8H PRN       PHYSICAL EXAM:     Vital Signs: /87 (BP Location: Right arm)   Pulse 97   Temp 97.8 °F (36.6 °C) (Oral)   Resp 16   Ht 6' 2\" (1.88 m)   Wt 245 lb (111.1 kg)   SpO2 98%   BMI 31.46 kg/m²   Temp (24hrs), Av.8 °F (36.6 °C), Min:97.8 °F (36.6 °C), Max:97.9 °F (36.6 °C)       Intake/Output Summary (Last 24 hours) at 2024 1145  Last data filed at 2024 0953  Gross per 24 hour   Intake 480 ml   Output 600 ml   Net -120 ml     Wt Readings from Last 3 Encounters:   24 245 lb (111.1 kg)   24 248 lb 8 oz (112.7 kg)   24 250 lb (113.4 kg)       General: no acute distress  HEENT: normocephalic, atraumatic  CV: RRR  Respiratory: no distress  Abdomen: soft, non-tender  Extremities: no edema bilaterally  Skin: warm, dry  Neuro: awake, alert     LABORATORY DATA:     Lab Results   Component Value Date     (H) 2024    BUN 83 (H) 2024    BUNCREA 13.0 2022    CREATSERUM 10.94 (H) 2024    ANIONGAP 13 2024    GFR 59 (L) 2018    GFRNAA 30 (L) 2022    GFRAA 35 (L) 2022    CA 8.5 (L) 2024    OSMOCALC 313 (H) 2024    ALKPHO 66 09/15/2024    AST 55 (H) 09/15/2024    ALT 25 09/15/2024    BILT 0.6 09/15/2024     TP 6.8 09/15/2024    ALB 3.5 09/15/2024    GLOBULIN 3.3 09/15/2024     09/20/2024    K 4.6 09/20/2024     09/20/2024    CO2 23.0 09/20/2024     Lab Results   Component Value Date    WBC 5.6 09/17/2024    RBC 3.02 (L) 09/17/2024    HGB 9.5 (L) 09/17/2024    HCT 28.6 (L) 09/17/2024    .0 09/17/2024    MPV 11.5 12/14/2012    MCV 94.7 09/17/2024    MCH 31.5 09/17/2024    MCHC 33.2 09/17/2024    RDW 15.1 09/17/2024    NEPRELIM 3.52 09/17/2024    NEPERCENT 63.4 09/17/2024    LYPERCENT 20.4 09/17/2024    MOPERCENT 11.5 09/17/2024    EOPERCENT 3.2 09/17/2024    BAPERCENT 1.3 09/17/2024    NE 3.52 09/17/2024    LYMABS 1.13 09/17/2024    MOABSO 0.64 09/17/2024    EOABSO 0.18 09/17/2024    BAABSO 0.07 09/17/2024     Lab Results   Component Value Date    MALBP 167.00 05/24/2023    CREUR 142.00 05/24/2023    CREUR 142.00 05/24/2023     Lab Results   Component Value Date    COLORUR Colorless (A) 09/16/2024    CLARITY Clear 09/16/2024    SPECGRAVITY 1.012 09/16/2024    GLUUR Normal 09/16/2024    BILUR Negative 09/16/2024    KETUR Negative 09/16/2024    BLOODURINE 3+ (A) 09/16/2024    PHURINE 6.0 09/16/2024    PROUR 50 (A) 09/16/2024    UROBILINOGEN Normal 09/16/2024    NITRITE Negative 09/16/2024    LEUUR Negative 09/16/2024    WBCUR 1-5 09/16/2024    RBCUR 0-2 09/16/2024    EPIUR Few (A) 09/16/2024    BACUR None Seen 09/16/2024    CAOXUR Occasional (A) 04/20/2018    HYLUR Present (A) 05/14/2019       IMAGING:     Reviewed    ASSESSMENT:      # ESRD on HD  -MWF schedule outpatient  -[facility]  -L AVF - needs to mature  -R TDC placed 9/20/2024     # HTN/Volume Status     # Hyperkalemia     # Metabolic acidosis     # Anemia     # Secondary Hyperparathyroidism    PLAN:      -Issues with L AVF - needs rest per Vascular surgery. Follow-up outpatient  -S/p TDC 9/20 per IR  -HD per MWF schedule   -UF with HD as tolerated  -Continue home BP medications - change clonidine to 0.1mg BID to avoid  rebound  -Defer OWEN in setting of high BPs  -Continue sevelamer  -Avoid nephrotoxins and renally dose medications for creatinine clearance  -Monitor intake and output daily  -Daily weights        Okay for discharge from nephrology perspective    Thank you for allowing us to participate in the care of this patient.       Samantha Muñoz,    [facility] - Nephrology

## 2024-09-20 NOTE — PLAN OF CARE
Received pt at 1930  Pt AOx4, NSR/ST, RA, VSS  PRN Dilaudid for pain & heat packs provided  NPO @[place]  D/c Planning: Permacath placement & HD 9/20  Call light within reach.  Needs currently met      Problem: NEUROLOGICAL - ADULT  Goal: Achieves stable or improved neurological status  Description: INTERVENTIONS  - Assess for and report changes in neurological status  - Initiate measures to prevent increased intracranial pressure  - Maintain blood pressure and fluid volume within ordered parameters to optimize cerebral perfusion and minimize risk of hemorrhage  - Monitor temperature, glucose, and sodium. Initiate appropriate interventions as ordered  Outcome: Progressing  Goal: Absence of seizures  Description: INTERVENTIONS  - Monitor for seizure activity  - Administer anti-seizure medications as ordered  - Monitor neurological status  Outcome: Progressing  Goal: Remains free of injury related to seizure activity  Description: INTERVENTIONS:  - Maintain airway, patient safety  and administer oxygen as ordered  - Monitor patient for seizure activity, document and report duration and description of seizure to MD/LIP  - If seizure occurs, turn patient to side and suction secretions as needed  - Reorient patient post seizure  - Seizure pads on all 4 side rails  - Instruct patient/family to notify RN of any seizure activity  - Instruct patient/family to call for assistance with activity based on assessment  Outcome: Progressing  Goal: Achieves maximal functionality and self care  Description: INTERVENTIONS  - Monitor swallowing and airway patency with patient fatigue and changes in neurological status  - Encourage and assist patient to increase activity and self care with guidance from PT/OT  - Encourage visually impaired, hearing impaired and aphasic patients to use assistive/communication devices  Outcome: Progressing     Problem: PAIN - ADULT  Goal: Verbalizes/displays adequate comfort level or patient's stated pain  goal  Description: INTERVENTIONS:  - Encourage pt to monitor pain and request assistance  - Assess pain using appropriate pain scale  - Administer analgesics based on type and severity of pain and evaluate response  - Implement non-pharmacological measures as appropriate and evaluate response  - Consider cultural and social influences on pain and pain management  - Manage/alleviate anxiety  - Utilize distraction and/or relaxation techniques  - Monitor for opioid side effects  - Notify MD/LIP if interventions unsuccessful or patient reports new pain  - Anticipate increased pain with activity and pre-medicate as appropriate  Outcome: Progressing

## 2024-09-20 NOTE — PROGRESS NOTES
[facility] Cardiology  Consultation Note      Godwin Fonseca Patient Status:  Inpatient    1980 MRN [id number]   Location [facility] 7NE-A Attending Phong Freedman, DO   Hosp Day # 5 PCP Prieto Novak MD     Reason for consult: Troponin    Subjective: OC, no reports CP or SOB.     Primary cardiologist: Devan Rankin MD     History of Present Illness:  Godwin Fonseca is a 44 year old male with ESRD on HD, H/o DVT/PE  s/p Eliquis x 6 mo, \"NSTEMI\"  with cath showing no sig CAD, h/o severe MR s/p MVR , redo complex robotically assisted MVR (2022), chronic HFpEF, h/o TIA, HTN, bipolar disease, who presented to [facility] on 9/15/2024 with SOB. Missed HD last Friday due to abdominal pain, vomiting and diarrhea. Since then has been SOB. He is at \"dry wt\" ~ 245-250 lbs. Denies LE edema. No CP. No fevers. BP has been high 180-190s. Last week had lower abd pain and dx'd with prostatitis, was taking levaquin. We are consulted for troponin ~ 500s. Again denies CP. Had cath  for + troponin that showed no sig CAD.     Medications:  Current Facility-Administered Medications   Medication Dose Route Frequency    ARIPiprazole (Abilify) tab 15 mg  15 mg Oral Daily    albuterol (Ventolin HFA) 108 (90 Base) MCG/ACT inhaler 2 puff  2 puff Inhalation Q6H PRN    umeclidinium bromide (Incruse Ellipta) 62.5 MCG/ACT inhaler 1 puff  1 puff Inhalation Daily    hydrALAzine (Apresoline) 20 mg/mL injection 10 mg  10 mg Intravenous Q6H PRN    amphetamine-dextroamphetamine (Adderall) tab 15 mg  15 mg Oral BID AC    HYDROmorphone (Dilaudid) 1 MG/ML injection 0.5 mg  0.5 mg Intravenous Q6H PRN    heparin (Porcine) 1000 UNIT/ML injection 1,500 Units  1.5 mL Intracatheter PRN Dialysis    cloNIDine (Catapres) tab 0.1 mg  0.1 mg Oral BID    buPROPion ER (Wellbutrin XL) 24 hr tab 150 mg  150 mg Oral Daily    carvedilol (Coreg) tab 25 mg  25 mg Oral BID with meals    dicyclomine (Bentyl) cap 10 mg   10 mg Oral TID PRN    fenofibrate micronized (Lofibra) cap 134 mg  134 mg Oral Nightly    FLUoxetine (PROzac) cap 40 mg  40 mg Oral Daily    fluticasone propionate (Flonase) 50 MCG/ACT nasal suspension 1 spray  1 spray Each Nare BID    hydrALAZINE (Apresoline) tab 50 mg  50 mg Oral BID    isosorbide mononitrate ER (Imdur) 24 hr tab 30 mg  30 mg Oral Daily    lamoTRIgine (LaMICtal) tab 150 mg  150 mg Oral Daily    levoFLOXacin (Levaquin) tab 250 mg  250 mg Oral Q48HR @ 0700    losartan (Cozaar) tab 100 mg  100 mg Oral Daily    NIFEdipine ER (Procardia-XL) 24 hr tab 60 mg  60 mg Oral BID    sevelamer carbonate (Renvela) tab 800 mg  800 mg Oral TID CC    tamsulosin (Flomax) cap 0.4 mg  0.4 mg Oral Daily    [Held by provider] heparin (Porcine) 5000 UNIT/ML injection 5,000 Units  5,000 Units Subcutaneous Q8H MARTHA    acetaminophen (Tylenol Extra Strength) tab 500 mg  500 mg Oral Q4H PRN    acetaminophen (Tylenol) tab 650 mg  650 mg Oral Q4H PRN    Or    HYDROcodone-acetaminophen (Norco) 5-325 MG per tab 1 tablet  1 tablet Oral Q4H PRN    Or    HYDROcodone-acetaminophen (Norco) 5-325 MG per tab 2 tablet  2 tablet Oral Q4H PRN    polyethylene glycol (PEG 3350) (Miralax) 17 g oral packet 17 g  17 g Oral Daily PRN    sennosides (Senokot) tab 17.2 mg  17.2 mg Oral Nightly PRN    bisacodyl (Dulcolax) 10 MG rectal suppository 10 mg  10 mg Rectal Daily PRN    ondansetron (Zofran) 4 MG/2ML injection 4 mg  4 mg Intravenous Q6H PRN    prochlorperazine (Compazine) 10 MG/2ML injection 5 mg  5 mg Intravenous Q8H PRN       Past Medical History:    Asthma (HCC)    Attention deficit hyperactivity disorder (ADHD)    Back problem    Bipolar 1 disorder (HCC)    CKD (chronic kidney disease) stage 3, GFR 30-59 ml/min (HCC)    Dr Meeks    Congestive heart disease (HCC)    COPD (chronic obstructive pulmonary disease) (HCC)    Coronary atherosclerosis    Deep vein thrombosis (HCC)    at age 17 R/T cast    Depression    Diabetes (HCC)    Essential  hypertension    3/21 echo: severe concentric LVH with normal EF and no MR or pHTN    Extrinsic asthma, unspecified    Heart attack (HCC)    2016- angiogram- no intervention    Heart valve disease    mitral valve repair in 1994/    High blood pressure    High cholesterol    History of blood transfusion    History of mitral valve repair    Hyperlipidemia    Low back pain    tight and stiff after sweeping and mopping    LVH (left ventricular hypertrophy)    Migraines    Mixed hyperlipidemia     HDL 38 LDL 97 VLDL 57     Monoclonal gammopathy    IgG kappa     MVP (mitral valve prolapse)    Repair 1994 at [place]; echoes as recently as 3/21 show mild or trivial MR and no stenosis    Neuropathy    Osteoarthritis    hip ,knees    Pneumonia due to organism    Pulmonary embolism (HCC)    Renal disorder    Stroke (HCC)    TIA (transient ischemic attack)    Initial history of left-sided weakness and slurred speech. (+) cocaine. MRI of the brain, CT angiogram of the head and neck, and 2D echo are all unremarkable.     TMJ (dislocation of temporomandibular joint)    Troponin level elevated    Trop 60 60 47 with TIA and no CP: Lexiscan negative with EF 51       Past Surgical History:   Procedure Laterality Date    Av fistula revision, open Left     Colonoscopy N/A 03/26/2023    Procedure: COLONOSCOPY;  Surgeon: Heath Vu MD;  Location:  ENDOSCOPY    Colonoscopy N/A 12/30/2023    Procedure: COLONOSCOPY with cold snare polypectomy and forcep polypectomy;  Surgeon: Ousmane Suarez MD;  Location:  ENDOSCOPY    Colonoscopy & polypectomy  2019    Egd  2019    Duodenitis. Biopsied. EUS for weight loss was negative    Heart surgery      Hernia surgery  08/17/2022    Dr Barnes    Laminectomy,>2 sgmt,lumbar  09/06/2018    L4-L5 Decomp Discectomy ROEM L4-L5    Mitralplasty w cp bypass  1994    [facility]: Repair    Repair rotator cuff,chronic Left     torn and had a ruptured bicep    Valve repair  1994    mitral valve        Family History  family history includes Alcohol and Other Disorders Associated in his father and maternal grandfather; Anxiety in his maternal aunt and maternal aunt; Bipolar Disorder in his maternal aunt; Cancer in his father, maternal grandfather, maternal uncle, mother, paternal aunt, and sister; Dementia in his father; Depression in his maternal aunt and maternal aunt; Diabetes in his maternal grandfather, maternal grandmother, and mother; Hypertension in his father, maternal grandfather, maternal grandmother, mother, paternal grandfather, paternal grandmother, and sister; Substance Abuse in his father.    Social History   reports that he quit smoking about 2 years ago. His smoking use included cigarettes. He started smoking about 29 years ago. He has a 27 pack-year smoking history. He has never been exposed to tobacco smoke. He has never used smokeless tobacco. He reports that he does not drink alcohol and does not use drugs.     Allergies  Allergies   Allergen Reactions    Hydrochlorothiazide RASH and HIVES       Review of Systems:  As per HPI, otherwise 10 point ROS is negative in detail.    Physical Exam:  Blood pressure 138/87, pulse 97, temperature 97.8 °F (36.6 °C), temperature source Oral, resp. rate 16, height 74\", weight 245 lb (111.1 kg), SpO2 98%.  Temp (24hrs), Av.8 °F (36.6 °C), Min:97.8 °F (36.6 °C), Max:97.9 °F (36.6 °C)    Wt Readings from Last 3 Encounters:   24 245 lb (111.1 kg)   24 248 lb 8 oz (112.7 kg)   24 250 lb (113.4 kg)       General: Awake and alert; in no acute distress  HEENT: Extraocular movements are intact; sclerae are anicteric; scalp is atraumatic  Neck: Supple; no JVD; no carotid bruits  Cardiac: Regular rate and regular rhythm; normal S1 and S2, 2/6 HSM apex, no rubs, or gallops are appreciated  Lungs: Clear to auscultation bilaterally; no accessory muscle use is noted, no wheezes, rhonci or rales  Abdomen: Soft, non-distended, non-tender; bowel  sounds are normoactive  Extremities: Warm, no edema, clubbing or cyanosis; moves all 4 extremities normally, distal pulses intact and equal  Psychiatric: Normal mood and affect; answers questions appropriately  Dermatologic: No rashes; normal skin turgor    Diagnostic testing:    Labs:   Lab Results   Component Value Date    PT 13.9 12/14/2012    INR 1.11 09/15/2024    INR 1.05 08/30/2024          Lab Results   Component Value Date    CREATSERUM 10.94 09/20/2024    BUN 83 09/20/2024     09/20/2024    K 4.6 09/20/2024     09/20/2024    CO2 23.0 09/20/2024     09/20/2024    CA 8.5 09/20/2024    PHOS 8.3 09/20/2024         Cardiac diagnostics:    Telemetry: Predominantly sinus rhythm, no malignant arrhythmias     CXR 9/15/24  There is interval improvement in consolidation right lower lobe consistent with improved right lower lobe pneumonia or atelectasis.  The remainder of the lungs is clear.  Heart size is within normal limits.  Mediastinum and eleinta are   unremarkable.  Right internal jugular tunneled dialysis catheter has been removed.     EKG 9/15/2024:   Normal sinus rhythm  Minimal voltage criteria for LVH, may be normal variant ( Sokolow-Rojas )  Borderline ECG     Echo 3/10/24:  1. Left ventricle: The cavity size was normal. There is moderarte concentric      left ventricular hypertrophy Systolic function was normal. The estimated      ejection fraction was 55-60%, by visual assessment. Wall motion is      normal; there are no regional wall motion abnormalities.   2. Mitral valve: There was mild to moderate regurgitation. There is history      of annular ring repair The mean diastolic gradient was 2mm Hg.     Impression:  44 year old male presenting with SOB after missing HD session (last 5 days PTA)  Mild troponin elevation - chronic, does not reflect ACS  \"NSTEMI\" 4/23 with cath showing no sig CAD  H/o severe MR s/p MVR 1994, redo complex robotically assisted MVR ([place] 2022)  Chronic  HFpEF  ESRD on HD  H/o DVT/PE 1/23 s/p Eliquis x 6 mo  Primary hyperaldosteronism  HTN - not controlled  H/o TIA  Bipolar disease  H/o cocaine use  H/o RLL PNA (8/8/24) treated with Abx, prednisone  Melena - C-scope, EGD no source, planned for capsule endoscopy  Chronic anemia    Recommendations:  L AVF not yet mature. TDC placed today.   HD per renal for volume and BP management, currently controlled  Continue usual home antihypertensive regimen for now with IV prn   Not on ASA due to recent GIB  OK for dc    Thank you for allowing our practice to participate in the care of your patient. Please do not hesitate to contact me if you have any questions.    Micheal Campos MD  Interventional Cardiology  [facility]  Office: [phone]

## 2024-09-23 NOTE — PAYOR COMM NOTE
--------------  DISCHARGE REVIEW    Payor: [employer]  Subscriber #:  [id number]  Authorization Number: [id number]    Admit date: 9/15/24  Admit time:  11:02 PM  Discharge Date: 9/20/2024  6:00 PM     Admitting Physician: Yuriy Forrest MD  Attending Physician:  No att. providers found  Primary Care Physician: Prieto Novak MD          Discharge Summary Notes        Discharge Summary signed by Phong Freedman DO at 9/20/2024  1:52 PM       Author: Phong Freedman DO Specialty: Internal Medicine Author Type: Physician    Filed: 9/20/2024  1:52 PM Date of Service: 9/20/2024 12:19 PM Status: Signed    : Phong Freedman DO (Physician)                                                          [facility] Internal Medicine Hospitalist Discharge Summary     Patient ID:  Godwin Fonseca  44 year old  4/12/1980    Admit date: 9/15/2024    Discharge date and time: 9/20/2024    Attending Physician: Phong Freedman DO     Primary Care Physician: Prieto Novak MD     Discharge Diagnoses:   Stage 5 CKD with fulid overload   LUE AVF stenosis  Hyperkalemia   Chronic diastolic HF  Elevated trop  CAD  Anemia of chronic disease   Chronic abd pain  HTN  COPD  HLD  Depression  Bipolar d/o    Please note that only [facility] DMG and EMG patients enrolled in the Medicare ACO, [facility] ACO and [facility] HMOs will be handled by the [facility] Care Management team.  For all other patients, please follow usual protocol for discharge care transition.    Discharge Condition: stable    Disposition:  Home    Important Follow up:  - PCP: 3-5 days  - Consults: Cards, renal     Follow Up Items:  None    Hospital Course:      44 yr old male with PMH sig for ESRD on HD (M/W/F), bipolar, depression, DM 2, HTN, DL, CAD, COPD, HFpEF, and chronic abdominal pain who presented to the ED for evaluation of shortness of breath.      # Stage 5 CKD with fluid overload   # Hyperkalemia   # Stenosis of LUE AVF  - suspect due to  missed HD  - renal c/s  - HD per renal  - monitor K  - 9/20 permcath placed  - vascular c/s, recommend rest of AVF for 1 month, then see outside vascular surgeon     # Acute on chronic diastolic HF  # Elevated troponin   # CAD  - suspect demand ischemia due to missed HD  - trop down trending   - last TTE 3/10/24 with intact LVEF  - cards c/s appreciated      # Anemia of chronic disease   # Hx of GI bleeding  - pt following with GI as outpt   - plan outpt capsule study   - monitor hgb     # Chronic abd pain  # Opioid dependence   - follows with pain clinic as outpt   - resume home pain medications   - minimize IV narcotics      # Prostatitis  - cont po levaquin      # L calf pain  - LLE venous dopplers neg for DVT     # Essential HTN  - BP elevated on admit  - resume home BP meds     # HLD  - cont statin     # COPD  - no evidence of acute exacerbation   - resume home inhalers      # Major depression, recurrent   # Bipolar d/o  - stable  - resume home medications     Stable for discharge home.    Consults: IP CONSULT TO NEPHROLOGY  IP CONSULT TO HOSPITALIST  IP CONSULT TO SOCIAL WORK  NURSING CONSULT TO DIETITIAN  IP CONSULT TO CARDIOLOGY    Operative Procedures:  None      Patient instructions:      I as the attending physician reconciled the current and discharge medications on day of discharge.        Discharge Medications        START taking these medications        Instructions Prescription details   HYDROcodone-acetaminophen  MG Tabs  Commonly known as: Norco      Take 1 tablet by mouth every 6 (six) hours as needed for Pain.   Quantity: 20 tablet  Refills: 0            CHANGE how you take these medications        Instructions Prescription details   cloNIDine 0.1 MG Tabs  Commonly known as: Catapres  What changed: when to take this      Take 1 tablet (0.1 mg total) by mouth 2 (two) times daily.   Quantity: 60 tablet  Refills: 0            CONTINUE taking these medications        Instructions Prescription  details   albuterol 108 (90 Base) MCG/ACT Aers  Commonly known as: Ventolin HFA      Inhale 2 puffs into the lungs every 6 (six) hours as needed for Wheezing.   Refills: 0     ARIPiprazole 5 MG Tabs  Commonly known as: Abilify      Take 3 tablets (15 mg total) by mouth daily.   Refills: 0     buPROPion  MG Tb24  Commonly known as: Wellbutrin XL      Take 1 tablet (150 mg total) by mouth daily.   Refills: 0     carvedilol 25 MG Tabs  Commonly known as: Coreg      Take 1 tablet (25 mg total) by mouth in the morning and 1 tablet (25 mg total) in the evening. Take with meals.   Quantity: 60 tablet  Refills: 6     dicyclomine 10 MG Caps  Commonly known as: Bentyl      Take 1 capsule (10 mg total) by mouth 3 (three) times daily as needed.   Quantity: 20 capsule  Refills: 0     Fenofibrate 134 MG Caps      Take 1 capsule by mouth nightly.   Quantity: 90 capsule  Refills: 1     FLUoxetine HCl 40 MG Caps  Commonly known as: PROZAC      Take 1 capsule (40 mg total) by mouth daily.   Quantity: 7 capsule  Refills: 0     fluticasone propionate 50 MCG/ACT Susp  Commonly known as: Flonase      SPRAY ONCE INTO EACH NOSTRIL BID PRN   Quantity: 15.8 mL  Refills: 0     hydrALAZINE 50 MG Tabs  Commonly known as: Apresoline      Take 1 tablet (50 mg total) by mouth in the morning and 1 tablet (50 mg total) before bedtime. Hold if systolic blood pressure <130.   Quantity: 30 tablet  Refills: 0     isosorbide mononitrate ER 30 MG Tb24  Commonly known as: Imdur      Take 1 tablet (30 mg total) by mouth daily. Hold if systolic blood pressure <130   Refills: 0     lamoTRIgine 150 MG Tabs  Commonly known as: LaMICtal      Take 1 tablet (150 mg total) by mouth daily.   Quantity: 7 tablet  Refills: 0     levoFLOXacin 250 MG Tabs  Commonly known as: Levaquin      TAKE 2 TABLETs BY MOUTH one time then take 1 tablet every 48 hours for 30 days   Refills: 0     Lisdexamfetamine Dimesylate 60 MG Caps  Commonly known as: VYVANSE      Take 70 mg  by mouth every morning.   Refills: 0     losartan 100 MG Tabs  Commonly known as: Cozaar      Take 1 tablet (100 mg total) by mouth daily. Hold if systolic blood pressure <130   Refills: 0     NIFEdipine ER 60 MG Tb24  Commonly known as: ADALAT CC      Take 1 tablet (60 mg total) by mouth in the morning and 1 tablet (60 mg total) before bedtime. Hold if systolic blood pressure <130.   Refills: 0     Renvela 800 MG Tabs  Generic drug: sevelamer carbonate      Take 1 tablet (800 mg total) by mouth 3 (three) times daily with meals.   Refills: 0     tamsulosin 0.4 MG Caps  Commonly known as: Flomax      Take 1 capsule (0.4 mg total) by mouth daily.   Refills: 0     tiotropium 18 MCG Caps  Commonly known as: Spiriva Handihaler      Inhale 1 capsule (18 mcg total) into the lungs daily.   Refills: 0            STOP taking these medications      hydrocortisone 25 MG Supp  Commonly known as: Anusol-HC                  Where to Get Your Medications        These medications were sent to [id number] - [place], [place] - [address] [phone], [phone]  [address] [place] [id number]      Phone: [phone]   cloNIDine 0.1 MG Tabs  HYDROcodone-acetaminophen  MG Tabs          Activity: activity as tolerated  Diet: renal diet  Wound Care: as directed  Code Status: Full Code      Exam on day of discharge:     Vitals:    09/17/24 0740   BP: 120/74   Pulse: 100   Resp: 18   Temp: 98 °F (36.7 °C)       Gen: No acute distress, alert and oriented x3, no focal neurologic deficits  HEENT:  EOMI, PERRLA, OP clear, MMM  Pulm:Diminished at the bases , normal respiratory effort  CV: Tachy, reg, no murmur.  Normal PMI.    Abd: Abdomen soft, nontender, nondistended, no organomegaly, bowel sounds present  MSK: Full range of motion in extremities, no clubbing, no cyanosis  Skin: no rashes or lesions  Neuro:  Grossly intact, no focal deficits  Lines:  R chest permcath in place    Total time coordinating care for  discharge: Greater than 30 minutes    Phong Freedman DO  [facility]       Electronically signed by Phong Freedman DO on 9/20/2024  1:52 PM         REVIEWER COMMENTS

## 2024-09-28 ENCOUNTER — HOSPITAL ENCOUNTER (EMERGENCY)
Age: 44
Discharge: HOME | End: 2024-09-28
Attending: EMERGENCY MEDICINE
Payer: MEDICARE

## 2024-09-28 VITALS
HEIGHT: 74 IN | HEART RATE: 94 BPM | OXYGEN SATURATION: 98 % | TEMPERATURE: 98 F | RESPIRATION RATE: 20 BRPM | WEIGHT: 245 LBS | BODY MASS INDEX: 31.44 KG/M2 | DIASTOLIC BLOOD PRESSURE: 78 MMHG | SYSTOLIC BLOOD PRESSURE: 121 MMHG

## 2024-09-28 DIAGNOSIS — Z99.2 END-STAGE RENAL DISEASE ON HEMODIALYSIS (HCC): Primary | ICD-10-CM

## 2024-09-28 DIAGNOSIS — R11.2 NAUSEA AND VOMITING IN ADULT: ICD-10-CM

## 2024-09-28 DIAGNOSIS — N18.6 END-STAGE RENAL DISEASE ON HEMODIALYSIS (HCC): Primary | ICD-10-CM

## 2024-09-28 DIAGNOSIS — I10 HYPERTENSION, UNSPECIFIED TYPE: ICD-10-CM

## 2024-09-28 LAB
ALBUMIN SERPL-MCNC: 3.9 G/DL (ref 3.4–5)
ALBUMIN/GLOB SERPL: 1 {RATIO} (ref 1–2)
ALP LIVER SERPL-CCNC: 71 U/L
ALT SERPL-CCNC: 12 U/L
ANION GAP SERPL CALC-SCNC: 10 MMOL/L (ref 0–18)
AST SERPL-CCNC: 64 U/L (ref 15–37)
BASOPHILS # BLD AUTO: 0.1 X10(3) UL (ref 0–0.2)
BASOPHILS NFR BLD AUTO: 1.4 %
BILIRUB SERPL-MCNC: 0.3 MG/DL (ref 0.1–2)
BUN BLD-MCNC: 81 MG/DL (ref 9–23)
CALCIUM BLD-MCNC: 8.5 MG/DL (ref 8.5–10.1)
CHLORIDE SERPL-SCNC: 105 MMOL/L (ref 98–112)
CO2 SERPL-SCNC: 20 MMOL/L (ref 21–32)
CREAT BLD-MCNC: 9.94 MG/DL
EGFRCR SERPLBLD CKD-EPI 2021: 6 ML/MIN/1.73M2 (ref 60–?)
EOSINOPHIL # BLD AUTO: 0.15 X10(3) UL (ref 0–0.7)
EOSINOPHIL NFR BLD AUTO: 2.1 %
ERYTHROCYTE [DISTWIDTH] IN BLOOD BY AUTOMATED COUNT: 13.5 %
GLOBULIN PLAS-MCNC: 4 G/DL (ref 2.8–4.4)
GLUCOSE BLD-MCNC: 183 MG/DL (ref 70–99)
HCT VFR BLD AUTO: 31.3 %
HGB BLD-MCNC: 10.8 G/DL
IMM GRANULOCYTES # BLD AUTO: 0.02 X10(3) UL (ref 0–1)
IMM GRANULOCYTES NFR BLD: 0.3 %
LYMPHOCYTES # BLD AUTO: 1.08 X10(3) UL (ref 1–4)
LYMPHOCYTES NFR BLD AUTO: 15.1 %
MCH RBC QN AUTO: 32 PG (ref 26–34)
MCHC RBC AUTO-ENTMCNC: 34.5 G/DL (ref 31–37)
MCV RBC AUTO: 92.6 FL
MONOCYTES # BLD AUTO: 0.68 X10(3) UL (ref 0.1–1)
MONOCYTES NFR BLD AUTO: 9.5 %
NEUTROPHILS # BLD AUTO: 5.12 X10 (3) UL (ref 1.5–7.7)
NEUTROPHILS # BLD AUTO: 5.12 X10(3) UL (ref 1.5–7.7)
NEUTROPHILS NFR BLD AUTO: 71.6 %
OSMOLALITY SERPL CALC.SUM OF ELEC: 309 MOSM/KG (ref 275–295)
PLATELET # BLD AUTO: 269 10(3)UL (ref 150–450)
POCT INFLUENZA A: NEGATIVE
POCT INFLUENZA B: NEGATIVE
POTASSIUM SERPL-SCNC: 4.9 MMOL/L (ref 3.5–5.1)
PROT SERPL-MCNC: 7.9 G/DL (ref 6.4–8.2)
RBC # BLD AUTO: 3.38 X10(6)UL
SARS-COV-2 RNA RESP QL NAA+PROBE: NOT DETECTED
SODIUM SERPL-SCNC: 135 MMOL/L (ref 136–145)
WBC # BLD AUTO: 7.2 X10(3) UL (ref 4–11)

## 2024-09-28 PROCEDURE — 96376 TX/PRO/DX INJ SAME DRUG ADON: CPT

## 2024-09-28 PROCEDURE — 85025 COMPLETE CBC W/AUTO DIFF WBC: CPT | Performed by: EMERGENCY MEDICINE

## 2024-09-28 PROCEDURE — 96375 TX/PRO/DX INJ NEW DRUG ADDON: CPT

## 2024-09-28 PROCEDURE — 80053 COMPREHEN METABOLIC PANEL: CPT | Performed by: EMERGENCY MEDICINE

## 2024-09-28 PROCEDURE — 87502 INFLUENZA DNA AMP PROBE: CPT | Performed by: EMERGENCY MEDICINE

## 2024-09-28 PROCEDURE — 99284 EMERGENCY DEPT VISIT MOD MDM: CPT

## 2024-09-28 PROCEDURE — 96374 THER/PROPH/DIAG INJ IV PUSH: CPT

## 2024-09-28 PROCEDURE — 99285 EMERGENCY DEPT VISIT HI MDM: CPT

## 2024-09-28 PROCEDURE — 96361 HYDRATE IV INFUSION ADD-ON: CPT

## 2024-09-28 RX ORDER — ONDANSETRON 2 MG/ML
4 INJECTION INTRAMUSCULAR; INTRAVENOUS ONCE
Status: COMPLETED | OUTPATIENT
Start: 2024-09-28 | End: 2024-09-28

## 2024-09-28 RX ORDER — SODIUM CHLORIDE 9 MG/ML
125 INJECTION, SOLUTION INTRAVENOUS CONTINUOUS
Status: DISCONTINUED | OUTPATIENT
Start: 2024-09-28 | End: 2024-09-28

## 2024-09-28 RX ORDER — HYDROMORPHONE HYDROCHLORIDE 1 MG/ML
0.5 INJECTION, SOLUTION INTRAMUSCULAR; INTRAVENOUS; SUBCUTANEOUS ONCE
Status: COMPLETED | OUTPATIENT
Start: 2024-09-28 | End: 2024-09-28

## 2024-09-28 RX ORDER — ONDANSETRON 4 MG/1
4 TABLET, ORALLY DISINTEGRATING ORAL EVERY 4 HOURS PRN
Qty: 10 TABLET | Refills: 0 | Status: SHIPPED | OUTPATIENT
Start: 2024-09-28 | End: 2024-10-08

## 2024-09-28 RX ORDER — CLONIDINE HYDROCHLORIDE 0.1 MG/1
0.1 TABLET ORAL ONCE
Status: COMPLETED | OUTPATIENT
Start: 2024-09-28 | End: 2024-09-28

## 2024-09-28 RX ORDER — HYDRALAZINE HYDROCHLORIDE 20 MG/ML
10 INJECTION INTRAMUSCULAR; INTRAVENOUS ONCE
Status: COMPLETED | OUTPATIENT
Start: 2024-09-28 | End: 2024-09-28

## 2024-09-28 NOTE — ED INITIAL ASSESSMENT (HPI)
Dialysis pt to ER because he hasn't felt well the past 2 days. He c/o NVD, fever, sore throat, body aches, and a headache. He missed Friday's dialysis session.

## 2024-09-28 NOTE — ED PROVIDER NOTES
Patient Seen in: [facility] [place]      History     Chief Complaint   Patient presents with    Cough/URI    Fever    Nausea/Vomiting/Diarrhea     Stated Complaint: flu like symptoms for 2 days    Subjective:   HPI    Patient is a 44-year-old male with fairly extensive medical history including incisional disease on hemodialysis, hypertension, diabetes presenting with 3 days of subjective fevers and chills, nausea and vomiting.  Has been able to keep his medications down occasionally but not consistently although he did tolerate them this morning.  He missed dialysis yesterday because of his illness.  Also reports exacerbation of his chronic neck pain.  No cough or shortness of breath.    Objective:   Past Medical History:    Asthma ([facility])    Attention deficit hyperactivity disorder (ADHD)    Back problem    Bipolar 1 disorder ([facility])    CKD (chronic kidney disease) stage 3, GFR 30-59 ml/min ([facility])    Dr Meeks    Congestive heart disease ([facility])    COPD (chronic obstructive pulmonary disease) ([facility])    Coronary atherosclerosis    Deep vein thrombosis ([facility])    at age 17 R/T cast    Depression    Diabetes ([facility])    Essential hypertension    3/21 echo: severe concentric LVH with normal EF and no MR or pHTN    Extrinsic asthma, unspecified    Heart attack ([facility])    2016- angiogram- no intervention    Heart valve disease    mitral valve repair in 1994/    High blood pressure    High cholesterol    History of blood transfusion    History of mitral valve repair    Hyperlipidemia    Low back pain    tight and stiff after sweeping and mopping    LVH (left ventricular hypertrophy)    Migraines    Mixed hyperlipidemia     HDL 38 LDL 97 VLDL 57     Monoclonal gammopathy    IgG kappa     MVP (mitral valve prolapse)    Repair 1994 at [place]; echoes as recently as 3/21 show mild or trivial MR and no stenosis    Neuropathy    Osteoarthritis    hip ,knees    Pneumonia due to organism    Pulmonary  embolism (HCC)    Renal disorder    Stroke (HCC)    TIA (transient ischemic attack)    Initial history of left-sided weakness and slurred speech. (+) cocaine. MRI of the brain, CT angiogram of the head and neck, and 2D echo are all unremarkable.     TMJ (dislocation of temporomandibular joint)    Troponin level elevated    Trop 60 60 47 with TIA and no CP: Lexiscan negative with EF 51              Past Surgical History:   Procedure Laterality Date    Av fistula revision, open Left     Colonoscopy N/A 2023    Procedure: COLONOSCOPY;  Surgeon: Heath Vu MD;  Location:  ENDOSCOPY    Colonoscopy N/A 2023    Procedure: COLONOSCOPY with cold snare polypectomy and forcep polypectomy;  Surgeon: Ousmane Suarez MD;  Location:  ENDOSCOPY    Colonoscopy & polypectomy      Egd  2019    Duodenitis. Biopsied. EUS for weight loss was negative    Heart surgery      Hernia surgery  2022    Dr Barnes    Laminectomy,>2 sgmt,lumbar  2018    L4-L5 Decomp Discectomy ROEM L4-L5    Mitralplasty w cp bypass      Lutz: Repair    Repair rotator cuff,chronic Left     torn and had a ruptured bicep    Valve repair      mitral valve                Social History     Socioeconomic History    Marital status:    Tobacco Use    Smoking status: Former     Current packs/day: 0.00     Average packs/day: 1 pack/day for 27.0 years (27.0 ttl pk-yrs)     Types: Cigarettes     Start date: 1995     Quit date: 2022     Years since quittin.5     Passive exposure: Never    Smokeless tobacco: Never   Vaping Use    Vaping status: Never Used   Substance and Sexual Activity    Alcohol use: No    Drug use: No     Social Determinants of Health     Financial Resource Strain: Medium Risk (2024)    Received from [facility]    Overall Financial Resource Strain (CARDIA)     Difficulty of Paying Living Expenses: Somewhat hard   Food Insecurity: No Food Insecurity (9/15/2024)    Food  Insecurity     Food Insecurity: Never true   Transportation Needs: No Transportation Needs (9/15/2024)    Transportation Needs     Lack of Transportation: No   Physical Activity: Inactive (5/7/2024)    Received from [facility]    Exercise Vital Sign     Days of Exercise per Week: 0 days     Minutes of Exercise per Session: 0 min   Stress: Stress Concern Present (5/7/2024)    Received from [facility]    [employer] [place] - Occupational Stress Questionnaire     Feeling of Stress : Rather much   Social Connections: Unknown (5/7/2024)    Received from [facility]    Social Connection and Isolation Panel [NHANES]     Frequency of Communication with Friends and Family: More than three times a week     Frequency of Social Gatherings with Friends and Family: More than three times a week     Attends Confucianist Services: Never     Active Member of Clubs or Organizations: No     Attends Club or Organization Meetings: Never     Marital Status: Patient unable to answer   Housing Stability: Low Risk  (9/15/2024)    Housing Stability     Housing Instability: No              Review of Systems    Positive for stated Chief Complaint: Cough/URI, Fever, and Nausea/Vomiting/Diarrhea    Other systems are as noted in HPI.  Constitutional and vital signs reviewed.      All other systems reviewed and negative except as noted above.    Physical Exam     ED Triage Vitals   BP 09/28/24 0639 (!) 182/136   Pulse 09/28/24 0639 113   Resp 09/28/24 0639 20   Temp 09/28/24 0639 97.5 °F (36.4 °C)   Temp src 09/28/24 0639 Oral   SpO2 09/28/24 0639 99 %   O2 Device 09/28/24 0714 None (Room air)       Current Vitals:   Vital Signs  BP: 123/84  Pulse: 92  Resp: 20  Temp: 97.5 °F (36.4 °C)  Temp src: Oral    Oxygen Therapy  SpO2: 97 %  O2 Device: None (Room air)            Physical Exam  Vitals and nursing note reviewed.   Constitutional:       Appearance: He is well-developed.   HENT:      Head:  Normocephalic and atraumatic.   Eyes:      Conjunctiva/sclera: Conjunctivae normal.      Pupils: Pupils are equal, round, and reactive to light.   Cardiovascular:      Rate and Rhythm: Normal rate and regular rhythm.      Heart sounds: Normal heart sounds.   Pulmonary:      Effort: Pulmonary effort is normal.      Breath sounds: Normal breath sounds.   Abdominal:      General: Bowel sounds are normal.      Palpations: Abdomen is soft.      Comments: Mild diffuse tenderness.  Nondistended.  No rebound or guarding.   Musculoskeletal:         General: Normal range of motion.      Cervical back: Normal range of motion and neck supple.   Skin:     General: Skin is warm and dry.   Neurological:      Mental Status: He is alert and oriented to person, place, and time.               ED Course     Labs Reviewed   COMP METABOLIC PANEL (14) - Abnormal; Notable for the following components:       Result Value    Glucose 183 (*)     Sodium 135 (*)     CO2 20.0 (*)     BUN 81 (*)     Creatinine 9.94 (*)     Calculated Osmolality 309 (*)     eGFR-Cr 6 (*)     AST 64 (*)     ALT 12 (*)     All other components within normal limits   CBC WITH DIFFERENTIAL WITH PLATELET - Abnormal; Notable for the following components:    RBC 3.38 (*)     HGB 10.8 (*)     HCT 31.3 (*)     All other components within normal limits   RAPID SARS-COV-2 BY PCR - Normal   POCT FLU TEST - Normal    Narrative:     This assay is a rapid molecular in vitro test utilizing nucleic acid amplification of influenza A and B viral RNA.                      MDM      44-year-old male with medical history as detailed above presenting with 3 days of what sounds like a viral syndrome with myalgias, subjective fevers and chills, nausea and vomiting.  Has not been able to keep much down although occasionally tolerates his meds and did this morning.  He is quite hypertensive on arrival and he did miss dialysis yesterday which is likely contributing to that.  Will medicate him  for pain and hydrate him gently, will check labs to evaluate for acidosis, hyperkalemia, other electrolyte abnormality.      Update at 9 AM.  Patient has mild acidosis but is not hyperkalemic, no other electrolyte abnormalities.  He does not need to be admitted for emergent dialysis.  Chronic anemia at baseline or better.  Flu and COVID are negative.  Patient was given dose of Dilaudid as well as initially 10 of hydralazine followed by 0.1 of clonidine and 10 of hydralazine.  Blood pressure is now 123/84.  Patient states his neck still hurts but this is chronic pain and he has pain meds at home.        Past Medical History-end-stage renal disease, hypertension, hemodialysis    Differential diagnosis before testing included acidosis, hyperkalemia      Testing ordered during this visit included labs      External chart review showed reviewed recent discharge summary      Medications Provided: Hydralazine, Zofran, Dilaudid, clonidine            Disposition:          Discharge  I have discussed with the patient the results of test, differential diagnosis, treatment plan, warning signs and symptoms which should prompt immediate return.  They expressed understanding of these instructions and agrees to the following plan provided.  They were given written discharge instructions and agrees to return for any concerns and voiced understanding and all questions were answered.                           Medical Decision Making      Disposition and Plan     Clinical Impression:  1. End-stage renal disease on hemodialysis (HCC)    2. Hypertension, unspecified type    3. Nausea and vomiting in adult         Disposition:  Discharge  9/28/2024  9:04 am    Follow-up:  Adrian Arce MD  [address] [facility] [address]  [phone]    Follow up            Medications Prescribed:  Current Discharge Medication List        START taking these medications    Details   ondansetron 4 MG Oral Tablet Dispersible Take 1 tablet (4 mg total) by  mouth every 4 (four) hours as needed for Nausea.  Qty: 10 tablet, Refills: 0

## 2024-09-29 ENCOUNTER — APPOINTMENT (OUTPATIENT)
Dept: GENERAL RADIOLOGY | Age: 44
End: 2024-09-29
Attending: EMERGENCY MEDICINE
Payer: MEDICARE

## 2024-09-29 ENCOUNTER — HOSPITAL ENCOUNTER (INPATIENT)
Facility: HOSPITAL | Age: 44
LOS: 2 days | Discharge: HOME | End: 2024-10-01
Attending: EMERGENCY MEDICINE | Admitting: HOSPITALIST
Payer: MEDICARE

## 2024-09-29 ENCOUNTER — APPOINTMENT (OUTPATIENT)
Dept: CT IMAGING | Age: 44
End: 2024-09-29
Attending: EMERGENCY MEDICINE
Payer: MEDICARE

## 2024-09-29 DIAGNOSIS — T82.7XXA HEMODIALYSIS CATHETER INFECTION, INITIAL ENCOUNTER (HCC): Primary | ICD-10-CM

## 2024-09-29 LAB
ALBUMIN SERPL-MCNC: 3.7 G/DL (ref 3.4–5)
ALBUMIN/GLOB SERPL: 1.1 {RATIO} (ref 1–2)
ALP LIVER SERPL-CCNC: 71 U/L
ALT SERPL-CCNC: 9 U/L
ANION GAP SERPL CALC-SCNC: 11 MMOL/L (ref 0–18)
AST SERPL-CCNC: 14 U/L (ref 15–37)
BASOPHILS # BLD AUTO: 0.09 X10(3) UL (ref 0–0.2)
BASOPHILS NFR BLD AUTO: 1.3 %
BILIRUB SERPL-MCNC: 0.3 MG/DL (ref 0.1–2)
BUN BLD-MCNC: 78 MG/DL (ref 9–23)
CALCIUM BLD-MCNC: 7.8 MG/DL (ref 8.5–10.1)
CHLORIDE SERPL-SCNC: 106 MMOL/L (ref 98–112)
CHOLEST SERPL-MCNC: 110 MG/DL (ref ?–200)
CO2 SERPL-SCNC: 22 MMOL/L (ref 21–32)
CREAT BLD-MCNC: 9.38 MG/DL
EGFRCR SERPLBLD CKD-EPI 2021: 6 ML/MIN/1.73M2 (ref 60–?)
EOSINOPHIL # BLD AUTO: 0.18 X10(3) UL (ref 0–0.7)
EOSINOPHIL NFR BLD AUTO: 2.6 %
ERYTHROCYTE [DISTWIDTH] IN BLOOD BY AUTOMATED COUNT: 13.5 %
GLOBULIN PLAS-MCNC: 3.5 G/DL (ref 2.8–4.4)
GLUCOSE BLD-MCNC: 120 MG/DL (ref 70–99)
HCT VFR BLD AUTO: 27.8 %
HDLC SERPL-MCNC: 42 MG/DL (ref 40–59)
HGB BLD-MCNC: 9.2 G/DL
IMM GRANULOCYTES # BLD AUTO: 0.02 X10(3) UL (ref 0–1)
IMM GRANULOCYTES NFR BLD: 0.3 %
INR BLD: 1.06 (ref 0.8–1.2)
LDLC SERPL CALC-MCNC: 48 MG/DL (ref ?–100)
LYMPHOCYTES # BLD AUTO: 1.14 X10(3) UL (ref 1–4)
LYMPHOCYTES NFR BLD AUTO: 16.6 %
MCH RBC QN AUTO: 31.4 PG (ref 26–34)
MCHC RBC AUTO-ENTMCNC: 33.1 G/DL (ref 31–37)
MCV RBC AUTO: 94.9 FL
MONOCYTES # BLD AUTO: 0.65 X10(3) UL (ref 0.1–1)
MONOCYTES NFR BLD AUTO: 9.4 %
NEUTROPHILS # BLD AUTO: 4.8 X10 (3) UL (ref 1.5–7.7)
NEUTROPHILS # BLD AUTO: 4.8 X10(3) UL (ref 1.5–7.7)
NEUTROPHILS NFR BLD AUTO: 69.8 %
NONHDLC SERPL-MCNC: 68 MG/DL (ref ?–130)
NT-PROBNP SERPL-MCNC: 5701 PG/ML (ref ?–125)
OSMOLALITY SERPL CALC.SUM OF ELEC: 313 MOSM/KG (ref 275–295)
PLATELET # BLD AUTO: 235 10(3)UL (ref 150–450)
POTASSIUM SERPL-SCNC: 4 MMOL/L (ref 3.5–5.1)
PROT SERPL-MCNC: 7.2 G/DL (ref 6.4–8.2)
PROTHROMBIN TIME: 13.6 SECONDS (ref 11.6–14.8)
RBC # BLD AUTO: 2.93 X10(6)UL
SODIUM SERPL-SCNC: 139 MMOL/L (ref 136–145)
TRIGL SERPL-MCNC: 110 MG/DL (ref 30–149)
TROPONIN I SERPL HS-MCNC: 207 NG/L
VLDLC SERPL CALC-MCNC: 16 MG/DL (ref 0–30)
WBC # BLD AUTO: 6.9 X10(3) UL (ref 4–11)

## 2024-09-29 PROCEDURE — 87040 BLOOD CULTURE FOR BACTERIA: CPT | Performed by: HOSPITALIST

## 2024-09-29 PROCEDURE — 85610 PROTHROMBIN TIME: CPT | Performed by: EMERGENCY MEDICINE

## 2024-09-29 PROCEDURE — 85025 COMPLETE CBC W/AUTO DIFF WBC: CPT | Performed by: EMERGENCY MEDICINE

## 2024-09-29 PROCEDURE — 71045 X-RAY EXAM CHEST 1 VIEW: CPT | Performed by: EMERGENCY MEDICINE

## 2024-09-29 PROCEDURE — 36415 COLL VENOUS BLD VENIPUNCTURE: CPT

## 2024-09-29 PROCEDURE — 84484 ASSAY OF TROPONIN QUANT: CPT | Performed by: EMERGENCY MEDICINE

## 2024-09-29 PROCEDURE — 87040 BLOOD CULTURE FOR BACTERIA: CPT | Performed by: EMERGENCY MEDICINE

## 2024-09-29 PROCEDURE — 93005 ELECTROCARDIOGRAM TRACING: CPT

## 2024-09-29 PROCEDURE — 99285 EMERGENCY DEPT VISIT HI MDM: CPT

## 2024-09-29 PROCEDURE — 96365 THER/PROPH/DIAG IV INF INIT: CPT

## 2024-09-29 PROCEDURE — 83880 ASSAY OF NATRIURETIC PEPTIDE: CPT | Performed by: EMERGENCY MEDICINE

## 2024-09-29 PROCEDURE — 93010 ELECTROCARDIOGRAM REPORT: CPT

## 2024-09-29 PROCEDURE — 80061 LIPID PANEL: CPT | Performed by: EMERGENCY MEDICINE

## 2024-09-29 PROCEDURE — 71260 CT THORAX DX C+: CPT | Performed by: EMERGENCY MEDICINE

## 2024-09-29 PROCEDURE — 80053 COMPREHEN METABOLIC PANEL: CPT | Performed by: EMERGENCY MEDICINE

## 2024-09-29 RX ORDER — DICYCLOMINE HYDROCHLORIDE 10 MG/1
10 CAPSULE ORAL 3 TIMES DAILY PRN
Status: DISCONTINUED | OUTPATIENT
Start: 2024-09-29 | End: 2024-10-01

## 2024-09-29 RX ORDER — ONDANSETRON 2 MG/ML
4 INJECTION INTRAMUSCULAR; INTRAVENOUS EVERY 6 HOURS PRN
Status: DISCONTINUED | OUTPATIENT
Start: 2024-09-29 | End: 2024-10-01

## 2024-09-29 RX ORDER — ALBUMIN (HUMAN) 12.5 G/50ML
25 SOLUTION INTRAVENOUS
Status: DISCONTINUED | OUTPATIENT
Start: 2024-09-29 | End: 2024-10-01

## 2024-09-29 RX ORDER — ALBUTEROL SULFATE 90 UG/1
2 INHALANT RESPIRATORY (INHALATION) EVERY 6 HOURS PRN
Status: DISCONTINUED | OUTPATIENT
Start: 2024-09-29 | End: 2024-10-01

## 2024-09-29 RX ORDER — CARVEDILOL 12.5 MG/1
25 TABLET ORAL 2 TIMES DAILY WITH MEALS
Status: DISCONTINUED | OUTPATIENT
Start: 2024-09-29 | End: 2024-10-01

## 2024-09-29 RX ORDER — ARIPIPRAZOLE 5 MG/1
15 TABLET ORAL DAILY
Status: DISCONTINUED | OUTPATIENT
Start: 2024-09-30 | End: 2024-10-01

## 2024-09-29 RX ORDER — HYDROCODONE BITARTRATE AND ACETAMINOPHEN 10; 325 MG/1; MG/1
1 TABLET ORAL EVERY 4 HOURS PRN
Status: DISCONTINUED | OUTPATIENT
Start: 2024-09-29 | End: 2024-10-01

## 2024-09-29 RX ORDER — PROCHLORPERAZINE EDISYLATE 5 MG/ML
5 INJECTION INTRAMUSCULAR; INTRAVENOUS EVERY 8 HOURS PRN
Status: DISCONTINUED | OUTPATIENT
Start: 2024-09-29 | End: 2024-10-01

## 2024-09-29 RX ORDER — SENNOSIDES 8.6 MG
17.2 TABLET ORAL NIGHTLY PRN
Status: DISCONTINUED | OUTPATIENT
Start: 2024-09-29 | End: 2024-10-01

## 2024-09-29 RX ORDER — ACETAMINOPHEN 500 MG
500 TABLET ORAL EVERY 4 HOURS PRN
Status: DISCONTINUED | OUTPATIENT
Start: 2024-09-29 | End: 2024-10-01

## 2024-09-29 RX ORDER — ONDANSETRON 4 MG/1
4 TABLET, ORALLY DISINTEGRATING ORAL EVERY 4 HOURS PRN
Status: DISCONTINUED | OUTPATIENT
Start: 2024-09-29 | End: 2024-10-01

## 2024-09-29 RX ORDER — LOSARTAN POTASSIUM 100 MG/1
100 TABLET ORAL DAILY
Status: DISCONTINUED | OUTPATIENT
Start: 2024-09-30 | End: 2024-10-01

## 2024-09-29 RX ORDER — BUPROPION HYDROCHLORIDE 150 MG/1
150 TABLET ORAL DAILY
Status: DISCONTINUED | OUTPATIENT
Start: 2024-09-30 | End: 2024-10-01

## 2024-09-29 RX ORDER — TAMSULOSIN HYDROCHLORIDE 0.4 MG/1
0.4 CAPSULE ORAL DAILY
Status: DISCONTINUED | OUTPATIENT
Start: 2024-09-29 | End: 2024-10-01

## 2024-09-29 RX ORDER — HEPARIN SODIUM 5000 [USP'U]/ML
5000 INJECTION, SOLUTION INTRAVENOUS; SUBCUTANEOUS EVERY 8 HOURS SCHEDULED
Status: DISCONTINUED | OUTPATIENT
Start: 2024-09-29 | End: 2024-10-01

## 2024-09-29 RX ORDER — BISACODYL 10 MG
10 SUPPOSITORY, RECTAL RECTAL
Status: DISCONTINUED | OUTPATIENT
Start: 2024-09-29 | End: 2024-10-01

## 2024-09-29 RX ORDER — POLYETHYLENE GLYCOL 3350 17 G/17G
17 POWDER, FOR SOLUTION ORAL DAILY PRN
Status: DISCONTINUED | OUTPATIENT
Start: 2024-09-29 | End: 2024-10-01

## 2024-09-29 RX ORDER — ISOSORBIDE MONONITRATE 30 MG/1
30 TABLET, EXTENDED RELEASE ORAL DAILY
Status: DISCONTINUED | OUTPATIENT
Start: 2024-09-30 | End: 2024-10-01

## 2024-09-29 RX ORDER — HYDRALAZINE HYDROCHLORIDE 50 MG/1
50 TABLET, FILM COATED ORAL 2 TIMES DAILY
Status: DISCONTINUED | OUTPATIENT
Start: 2024-09-29 | End: 2024-10-01

## 2024-09-29 RX ORDER — HYDROCODONE BITARTRATE AND ACETAMINOPHEN 5; 325 MG/1; MG/1
2 TABLET ORAL ONCE
Status: COMPLETED | OUTPATIENT
Start: 2024-09-29 | End: 2024-09-29

## 2024-09-29 RX ORDER — SEVELAMER CARBONATE 800 MG/1
800 TABLET, FILM COATED ORAL
Status: DISCONTINUED | OUTPATIENT
Start: 2024-09-29 | End: 2024-10-01

## 2024-09-29 RX ORDER — CLONIDINE HYDROCHLORIDE 0.1 MG/1
0.1 TABLET ORAL 2 TIMES DAILY
Status: DISCONTINUED | OUTPATIENT
Start: 2024-09-29 | End: 2024-10-01

## 2024-09-29 RX ORDER — LAMOTRIGINE 150 MG/1
150 TABLET ORAL DAILY
Status: DISCONTINUED | OUTPATIENT
Start: 2024-09-30 | End: 2024-10-01

## 2024-09-29 RX ORDER — NIFEDIPINE 60 MG/1
60 TABLET, EXTENDED RELEASE ORAL 2 TIMES DAILY
Status: DISCONTINUED | OUTPATIENT
Start: 2024-09-29 | End: 2024-10-01

## 2024-09-29 NOTE — PROGRESS NOTES
NURSING ADMISSION NOTE      Patient admitted via Ambulance  Oriented to room.  Safety precautions initiated.  Bed in low position.  Call light in reach.      A&OX4.   Right Permacath catheter in place. Needs dressing changed.   R forearm PIV.   Left arm precautions for AV fistula.

## 2024-09-29 NOTE — ED QUICK NOTES
Orders for admission, patient is aware of plan and ready to go upstairs. Any questions, please call ED RN juan at extension [phone].     Patient Covid vaccination status: Fully vaccinated     COVID Test Ordered in ED: None    COVID Suspicion at Admission: N/A    Running Infusions:      Mental Status/LOC at time of transport: a and ox3    Other pertinent information:   CIWA score: N/A   NIH score:  N/A

## 2024-09-29 NOTE — ED PROVIDER NOTES
Patient Seen in: [facility] In [place]      History     Chief Complaint   Patient presents with    Fatigue    Rash Skin Problem     Stated Complaint: fatigue,pain and oozing from port nausea and vomiting . seen yesterday for same*    Subjective:   HPI    44-year-old gentleman history of ESRD on Monday Wednesday Friday dialysis, here for evaluation pain and some drainage from his tunneled subclavian catheter site.  States this been in place for many months, over the past few days has noticed an increase in pain and swelling in the area.  States today he noted some foul appearing discharge on the antibiotic disc over the site itself.  States he has chills at home denies any fevers any specific cough or cold symptoms.  States he missed dialysis on Friday 2 days ago because he was not feeling well.  Did have some vomiting intermittently over the past few days but has tolerated p.o. since last episode.  Was seen in the ER yesterday, had unremarkable labs, states he continues to feel poorly.  No other acute complaints.    Objective:   Past Medical History:    Asthma ([facility])    Attention deficit hyperactivity disorder (ADHD)    Back problem    Bipolar 1 disorder ([facility])    CKD (chronic kidney disease) stage 3, GFR 30-59 ml/min ([facility])    Dr Meeks    Congestive heart disease ([facility])    COPD (chronic obstructive pulmonary disease) ([facility])    Coronary atherosclerosis    Deep vein thrombosis ([facility])    at age 17 R/T cast    Depression    Diabetes ([facility])    Essential hypertension    3/21 echo: severe concentric LVH with normal EF and no MR or pHTN    Extrinsic asthma, unspecified    Heart attack ([facility])    2016- angiogram- no intervention    Heart valve disease    mitral valve repair in 1994/    High blood pressure    High cholesterol    History of blood transfusion    History of mitral valve repair    Hyperlipidemia    Low back pain    tight and stiff after sweeping and mopping    LVH (left ventricular hypertrophy)     Migraines    Mixed hyperlipidemia     HDL 38 LDL 97 VLDL 57     Monoclonal gammopathy    IgG kappa     MVP (mitral valve prolapse)    Repair  at [facility]; echoes as recently as 3/21 show mild or trivial MR and no stenosis    Neuropathy    Osteoarthritis    hip ,knees    Pneumonia due to organism    Pulmonary embolism ([facility])    Renal disorder    Stroke ([facility])    TIA (transient ischemic attack)    Initial history of left-sided weakness and slurred speech. (+) cocaine. MRI of the brain, CT angiogram of the head and neck, and 2D echo are all unremarkable.     TMJ (dislocation of temporomandibular joint)    Troponin level elevated    Trop 60 60 47 with TIA and no CP: Lexiscan negative with EF 51              Past Surgical History:   Procedure Laterality Date    Av fistula revision, open Left     Colonoscopy N/A 2023    Procedure: COLONOSCOPY;  Surgeon: Heath Vu MD;  Location:  ENDOSCOPY    Colonoscopy N/A 2023    Procedure: COLONOSCOPY with cold snare polypectomy and forcep polypectomy;  Surgeon: Ousmane Suarez MD;  Location:  ENDOSCOPY    Colonoscopy & polypectomy      [facility]  2019    Duodenitis. Biopsied. EUS for weight loss was negative    Heart surgery      Hernia surgery  2022    Dr Barnes    Laminectomy,>2 sgmt,lumbar  2018    L4-L5 Decomp Discectomy RO[facility] L4-L5    Mitralplasty w cp bypass      [facility]: Repair    Repair rotator cuff,chronic Left     torn and had a ruptured bicep    Valve repair      mitral valve                Social History     Socioeconomic History    Marital status:    Tobacco Use    Smoking status: Former     Current packs/day: 0.00     Average packs/day: 1 pack/day for 27.0 years (27.0 ttl pk-yrs)     Types: Cigarettes     Start date: 1995     Quit date: 2022     Years since quittin.5     Passive exposure: Never    Smokeless tobacco: Never   Vaping Use    Vaping status: Never Used   Substance and Sexual Activity     Alcohol use: No    Drug use: No     Social Determinants of Health     Financial Resource Strain: Medium Risk (5/7/2024)    Received from [facility]    Overall Financial Resource Strain (CARDIA)     Difficulty of Paying Living Expenses: Somewhat hard   Food Insecurity: No Food Insecurity (9/29/2024)    Food Insecurity     Food Insecurity: Never true   Transportation Needs: No Transportation Needs (9/29/2024)    Transportation Needs     Lack of Transportation: No   Physical Activity: Inactive (5/7/2024)    Received from [facility]    Exercise Vital Sign     Days of Exercise per Week: 0 days     Minutes of Exercise per Session: 0 min   Stress: Stress Concern Present (5/7/2024)    Received from [facility]    [employer] [place] - Occupational Stress Questionnaire     Feeling of Stress : Rather much   Social Connections: Unknown (5/7/2024)    Received from [facility]    Social Connection and Isolation Panel [NHANES]     Frequency of Communication with Friends and Family: More than three times a week     Frequency of Social Gatherings with Friends and Family: More than three times a week     Attends Religion Services: Never     Active Member of Clubs or Organizations: No     Attends Club or Organization Meetings: Never     Marital Status: Patient unable to answer   Housing Stability: Low Risk  (9/29/2024)    Housing Stability     Housing Instability: No              Review of Systems    Positive for stated Chief Complaint: Fatigue and Rash Skin Problem    Other systems are as noted in HPI.  Constitutional and vital signs reviewed.      All other systems reviewed and negative except as noted above.    Physical Exam     ED Triage Vitals [09/29/24 1231]   /87   Pulse 85   Resp (!) 28   Temp 98.6 °F (37 °C)   Temp src Temporal   SpO2 98 %   O2 Device None (Room air)       Current Vitals:   Vital Signs  BP: (!) 167/103  Pulse: 79  Resp: 22  Temp: 98.5 °F (36.9  °C)  Temp src: Oral  MAP (mmHg): (!) 120    Oxygen Therapy  SpO2: 99 %  O2 Device: None (Room air)  Pulse Oximetry Type: Continuous  Oximetry Probe Site Changed: No  Pulse Ox Probe Location: Left hand            Physical Exam        Physical Exam  Vitals signs and nursing note reviewed.   General:  Patient laying supine in the bed in no acute distress  Head: Normocephalic and atraumatic.   HEENT:  Mucous membranes are moist.   Cardiovascular:  Normal rate and regular rhythm.  No Edema  Chest wall: Right subclavian tunneled catheter site with diffuse tenderness, no significant erythema or edema noted, no drainage appreciated on the overlying dressing  Pulmonary:  Pulmonary effort is normal.  Normal breath sounds. No wheezing, rhonchi or rales.   Abdominal: Soft nontender nondistended, normal bowel sounds, no guarding no rebound tenderness  Skin: Warm and dry  Neurological: Awake alert, speech is normal        ED Course     Labs Reviewed   COMP METABOLIC PANEL (14) - Abnormal; Notable for the following components:       Result Value    Glucose 120 (*)     BUN 78 (*)     Creatinine 9.38 (*)     Calcium, Total 7.8 (*)     Calculated Osmolality 313 (*)     eGFR-Cr 6 (*)     AST 14 (*)     ALT 9 (*)     All other components within normal limits   CBC WITH DIFFERENTIAL WITH PLATELET - Abnormal; Notable for the following components:    RBC 2.93 (*)     HGB 9.2 (*)     HCT 27.8 (*)     All other components within normal limits   TROPONIN I HIGH SENSITIVITY - Abnormal; Notable for the following components:    Troponin I (High Sensitivity) 207 (*)     All other components within normal limits   PRO BETA NATRIURETIC PEPTIDE - Abnormal; Notable for the following components:    Pro-Beta Natriuretic Peptide 5,701 (*)     All other components within normal limits   PROTHROMBIN TIME (PT) - Normal   LIPID PANEL - Normal   BLOOD CULTURE   BLOOD CULTURE   BLOOD CULTURE   BLOOD CULTURE     EKG    Rate, intervals and axes as noted on EKG  Report.  Rate: 85  Rhythm: Sinus Rhythm  Reading: Prolonged QT at 490 nonspecific changes no acute ischemic changes              CT CHEST(CONTRAST ONLY) (CPT=71260)    Result Date: 9/29/2024  PROCEDURE:  CT CHEST(CONTRAST ONLY) (CPT=71260)  COMPARISON:  [facility], CT, CTA CHEST + CT ABD (W) + CT PEL (W) SH(CPT=71275/97984), 11/23/2023, 4:31 PM.  INDICATIONS:  ID request - possible tunneled R subclavian catheter infection.  TECHNIQUE:  CT images were obtained with non-ionic intravenous contrast material. Dose reduction techniques were used. Dose information is transmitted to the ACR (American College of Radiology) NRDR (National Radiology Data Registry) which includes the Dose Index Registry.  PATIENT STATED HISTORY:(As transcribed by Technologist)   body aches, chill, night sweats- noticed drainage from dialysis cath this am   CONTRAST USED:  75cc of Isovue 370  FINDINGS:  LUNGS:  Multifocal dependent wedge-shaped areas of consolidation in lower lobes are noted and appears similar to previous study likely indicating chronic postinflammatory scar and atelectasis.  There is emphysematous change noted in upper lobes. VASCULATURE:  No visible pulmonary arterial thrombus or attenuation.  BASIL:  Right hilar lymph node measures 2.8 x 1.7 cm (previously 2.5 x 1.5 cm). MEDIASTINUM:  Low right paratracheal lymph node series 2, image 41 measures 2.2 x 1.4 cm (previously 2.2 x 1.5 cm). CARDIAC:  Metallic densities within the mitral valve are presumably from prior surgical reconstruction or replacement of the valve. PLEURA:  No mass or effusion.  THORACIC AORTA:  No aneurysm.  CHEST WALL:  Right internal jugular tunneled dialysis catheter is noted.  The tip of this is in SVC.  The entirety of the catheter is not included on the chest CT.  There is no acute abnormality associated with the catheter within the limits of this study.  LIMITED ABDOMEN:  Limited images of the upper abdomen are unremarkable.  BONES:  No bony lesion or  fracture.             CONCLUSION:  1. Right hilar and right mediastinal lymph nodes have increased slightly in size and are probably reactive. 2. Wedge-shaped areas of density in the right lung are unchanged and probably a combination of chronic scar and atelectasis. 3. Residual prior mitral valve surgery is noted. 4. There is otherwise no acute CT finding.   LOCATION:  [facility]   Dictated by (CST): Adrian Blevins MD on 9/29/2024 at 5:05 PM     Finalized by (CST): Adrian Blevins MD on 9/29/2024 at 5:09 PM       XR CHEST AP PORTABLE  (CPT=71045)    Result Date: 9/29/2024  PROCEDURE:  XR CHEST AP PORTABLE  (CPT=71045)  TECHNIQUE:  AP chest radiograph was obtained.  COMPARISON:  [facility], XR, XR CHEST AP PORTABLE  (CPT=71045), 9/15/2024, 8:01 PM.  INDICATIONS:  fatigue,pain and oozing from port nausea and vomiting . seen yesterday for same symptoms  PATIENT STATED HISTORY: (As transcribed by Technologist)  Pt c/o fatigue and nausea. Hx of heart surgery.    FINDINGS:  Valve prosthesis noted.  Cardiomegaly with normal pulmonary vascularity.  Small right pleural effusion.  Right dialysis catheter tips in the SVC.  No evidence of pneumothorax.            CONCLUSION:  No lobar pneumonia or overt congestive failure.  Small right pleural effusion.   LOCATION:  [id number]      Dictated by (CST): Jj Conley MD on 9/29/2024 at 2:44 PM     Finalized by (CST): Jj Conley MD on 9/29/2024 at 2:44 PM       IR CENTRAL VENOUS ACCESS    Result Date: 9/20/2024            PROCEDURE:  IR CENTRAL VENOUS CATHETER INSERTION  COMPARISON:  None.  INDICATIONS:  Dialysis, end-stage renal disease  TECHNIQUE:  Prior to the procedure, I discussed with the patient and/or legal representative the potential benefits, risks, and side effects of this procedure, the likelihood of the patient achieving goals; and the potential problems that might occur during recuperation.  I discussed reasonable alternatives to the procedure, including risks,  benefits, steps to prevent infection and side effects related to the alternatives, and risks related to not receiving this procedure. A witnessed verbal and signed consent was obtained and documented in the patient's chart.  IV was checked and maintained by the nurse. Moderate conscious sedation was performed under continuous pulse oximetry and cardiac monitoring under my direct supervision.  The radiology nurse was in attendance throughout the exam. Moderate conscious sedation of 4 mg Versed and 200 mcg of Fentanyl was given.  Patient was assessed and monitoring of oxygen saturation, heart rate, and blood pressure by the nursing staff and myself during the exam for a total intraservice time of 30 minutes of conscious sedation time from 8:22 a.m. to 8:52 a.m..  2 grams of Ancef were given pre-procedurally via existing IV.  The patient was placed supine on the angiographic table and the right chest and neck was prepped and covered with a full body drape in the usual sterile fashion.  All operators present for the case performed standard pre-procedural prep including hand washing, sterile gloves, gown, mask, and cap.  All aspects of the maximum sterile barrier technique were followed.  A preprocedural time out was performed with all physicians, technologists, and nurses involved with the procedure. Sonography of the right  neck demonstrated included right internal jugular vein and a patent right external jugular vein and a permanent image was obtained.  Under sonographic guidance, the right external jugular vein was accessed using a micropuncture system.  A guidewire was advanced into the IVC under fluoroscopic guidance.  Using blunt dissection a dual lumen tunneled catheter was placed via a peel-away sheath.  Both lumens flushed and returned easily.  The catheter was secured and heparinized.  A small amount of Dermabond was placed at the neck venotomy site.   Sterile gauze and transparent dressing was applied.  The  patient tolerated the procedure well and there were no immediate complications. Routine post tunneled catheter insertion instructions were communicated to the patient.  ESTIMATED BLOOD LOSS: Less than 5cc.  FLUORO TIME/DOSE: 0.2 minutes  AIR KERMA: 1.13 mGy  IMPRESSION: Successful placement of right external jugular vein tunneled dialysis catheter ( 23 cm tip to cuff Bard catheter).  The right internal jugular vein was occluded more centrally.  PLAN: The patient will follow-up with nephrology and at dialysis.  Catheter is ready for use, routine post-catheter care.  LOCATION:  [facility]    Dictated by (CST): Quinn Bernal MD on 9/20/2024 at 9:43 AM     Finalized by (CST): Quinn Bernal MD on 9/20/2024 at 9:44 AM       US VENOUS DOPPLER LEG LEFT - DIAG IMG (CPT=93971)    Result Date: 9/19/2024  PROCEDURE:  US VENOUS DOPPLER LEG LEFT - DIAG IMG (CPT=93971)  COMPARISON:  [facility], US DUPLEX SCAN HEMODIALYSIS ACCESS  (CPT=93990), 9/18/2024, 2:38 PM.  INDICATIONS:  Left calf pain  TECHNIQUE:  Real time, grey scale, and duplex ultrasound was used to evaluate the lower extremity venous system. B-mode two-dimensional images of the vascular structures, Doppler spectral analysis, and color flow.  Doppler imaging were performed.  The following veins were imaged:  Common, deep, and superficial femoral, popliteal, sapheno-femoral junction, posterior tibial veins, and the contralateral common femoral vein.  PATIENT STATED HISTORY: (As transcribed by Technologist)     FINDINGS:  EXTREMITY EXAMINED:  Left lower extremity SAPHENOFEMORAL JUNCTION:  No reflux. THROMBI:  None visible. COMPRESSION:  Normal compressibility, phasicity, and augmentation. OTHER:  Negative.            CONCLUSION:  Negative for deep venous thrombosis of the left lower extremity   LOCATION:  [facility]    Dictated by (CST): Claude Singh MD on 9/19/2024 at 10:06 AM     Finalized by (CST): Claude Singh MD on 9/19/2024 at 10:06 AM       US DUPLEX SCAN HEMODIALYSIS  ACCESS  (CPT=93990)    Result Date: 9/18/2024  PROCEDURE:  US DUPLEX SCAN HEMODIALYSIS ACCESS  (CPT=93990)  COMPARISON:  None.  INDICATIONS:  LUE AVF access issue  TECHNIQUE:  Real time gray scale and duplex color Doppler sonography was used to evaluate the left upper extremity arterial and venous system. Being noted two-dimensional images of the vascular structures, Doppler spectral analysis, and color Doppler imaging were performed  PATIENT STATED HISTORY: (As transcribed by Technologist)     FINDINGS:   Left UPPER EXTREMITY ARTERIAL: Proximal brachial: diameter 8 mm, velocity 109 centimeters/sec Mid brachial: diameter 7 mm, velocity 77 centimeters/sec Distal brachial: diameter 7 mm, velocity 83 centimeters/sec Proximal radial: diameter 6 mm, velocity 95 centimeters/sec Mid radial: diameter 6 mm, velocity 89 centimeters/sec Distal radial: diameter 6 mm, velocity 89 centimeters/sec Fistula/graft arterial anastomosis: diameter 7 mm, velocity 345 centimeters/sec Proximal graft: diameter 3 mm, velocity 380 centimeters/sec Mid graft: diameter 4 mm, velocity 74 centimeters/sec Distal graft: diameter 67 mm, velocity 4 centimeters/sec  Left UPPER EXTREMITY VENOUS: Proximal arm cephalic vein: diameter 2 mm, velocity 6 centimeters/sec Mid arm cephalic vein:  diameter 2 mm, velocity 9 centimeters/sec Distal arm cephalic vein: diameter 2 mm, velocity 5 centimeters/sec Prox basilic vein: diameter 7 mm, velocity 24 centimeters/sec Mid basilic vein: diameter 6 mm, velocity 44 centimeters/sec Distal basilic vein: diameter 6 mm, velocity 29 centimeters/sec            CONCLUSION:  Elevated velocity of the left upper extremity fistula at the anastomosis and proximal venous outflow is suggestive of stenosis in this region.   LOCATION:  [id number]    Dictated by (CST): Smith Randle MD on 9/18/2024 at 4:07 PM     Finalized by (CST): Smith Randle MD on 9/18/2024 at 4:12 PM       CT ABDOMEN+PELVIS(CPT=74176)    Result Date:  9/15/2024  PROCEDURE:  CT ABDOMEN+PELVIS (CPT=74176)  COMPARISON:  [facility], CT, CT ABDOMEN+PELVIS(CPT=74176), 8/30/2024, 9:57 AM.  INDICATIONS:  perla, abd pain  TECHNIQUE:  Unenhanced multislice CT scanning was performed from the dome of the diaphragm to the pubic symphysis.  Dose reduction techniques were used. Dose information is transmitted to the ACR (American College of Radiology) NRDR (National Radiology Data Registry) which includes the Dose Index Registry.  PATIENT STATED HISTORY: (As transcribed by Technologist)     FINDINGS:  LIVER:  No enlargement, atrophy, abnormal density, or significant focal lesion.  BILIARY:  No visible dilatation or calcification.  PANCREAS:  No lesion, fluid collection, ductal dilatation, or atrophy.  SPLEEN:  No enlargement or focal lesion.  KIDNEYS:  No mass, obstruction, or calcification.  ADRENALS:  No mass or enlargement.  AORTA/VASCULAR:    Unremarkable as seen on non-contrast imaging. RETROPERITONEUM:  No mass or adenopathy.  BOWEL/MESENTERY:  There is diffuse diverticulosis of the colon.  There is no CT evidence of diverticulitis. ABDOMINAL WALL:  No mass or hernia.  URINARY BLADDER:  No visible focal wall thickening, lesion, or calculus.  PELVIC NODES:  No adenopathy.  PELVIC ORGANS:  No visible mass.  Pelvic organs appropriate for patient age.  BONES:  There is advanced degenerative disc disease in the lumbar spine. LUNG BASES:  Dependent atelectasis right lower lobe is noted.  There is a small right pleural effusion. OTHER:  Negative.             CONCLUSION:  1. A specific etiology for abdominal pain is not evident from this study. 2. There is diverticulosis of colon without CT evidence of diverticulitis. 3. Small right pleural effusion has decreased since prior study.    LOCATION:  [facility]   Dictated by (CST): Adrian Blevins MD on 9/15/2024 at 10:12 PM     Finalized by (CST): Adrian Blevins MD on 9/15/2024 at 10:15 PM       XR CHEST AP PORTABLE  (CPT=71045)    Result  Date: 9/15/2024  PROCEDURE:  XR CHEST AP PORTABLE  (CPT=71045)  TECHNIQUE:  AP chest radiograph was obtained.  COMPARISON:  [facility] , XR, XR CHEST PA + LAT CHEST (TFE=88986), 9/02/2024, 7:58 AM.  [facility] , XR, XR CHEST AP PORTABLE  (CPT=71045), 8/30/2024, 11:23 PM.  INDICATIONS:  perla, abd pain  PATIENT STATED HISTORY: (As transcribed by Technologist)  Patient states he has difficulty in breathing and productive cough for 2 days. History of asthma and pneumonia.    FINDINGS:  There is interval improvement in consolidation right lower lobe consistent with improved right lower lobe pneumonia or atelectasis.  The remainder of the lungs is clear.  Heart size is within normal limits.  Mediastinum and elenita are unremarkable.  Right internal jugular tunneled dialysis catheter has been removed.            CONCLUSION:  1. There is interval removal of right tunneled dialysis catheter. 2. There is improvement in opacity right lung base consistent with approved atelectasis or pneumonia.    LOCATION:  [facility]      Dictated by (CST): Adrian Blevins MD on 9/15/2024 at 8:23 PM     Finalized by (CST): Adrian Blevins MD on 9/15/2024 at 8:24 PM       XR CHEST PA + LAT CHEST (CPT=71046)    Result Date: 9/2/2024  PROCEDURE:  XR CHEST PA + LAT CHEST (CPT=71046)  INDICATIONS:  hypoxia  COMPARISON:  [facility], CT, CT ABDOMEN+PELVIS(CPT=74176), 8/30/2024, 9:57 AM.  [facility] , XR, XR CHEST AP PORTABLE  (CPT=71045), 8/30/2024, 11:23 PM.  [facility], XR, XR CHEST AP PORTABLE  (CPT=71045), 8/30/2024, 8:57 AM.  TECHNIQUE:  PA and lateral chest radiographs were obtained.  PATIENT STATED HISTORY: (As transcribed by Technologist)  Patient offered no additional history at this time.               CONCLUSION:  The patient is status post mitral valve replacement.  The heart size is within normal limits without CHF.  A right central venous catheter/Rito catheter is noted with tip in the distal SVC. There is decrease in the right lower lobe and right  perihilar consolidation with slight decrease in the right effusion.  Stable slight thickening of the horizontal fissure.  The left lung is clear.  No pneumothorax.  Bolus changes are noted in the right lung base laterally/right middle lobe.   LOCATION:  [facility]   Dictated by (CST): Albert Faith MD on 9/02/2024 at 8:09 AM     Finalized by (CST): Albert Faith MD on 9/02/2024 at 8:11 AM       XR CHEST AP PORTABLE  (CPT=71045)    Result Date: 8/30/2024  PROCEDURE:  XR CHEST AP PORTABLE  (CPT=71045)  TECHNIQUE:  AP chest radiograph was obtained.  COMPARISON:  [facility], XR, XR CHEST AP PORTABLE  (CPT=71045), 8/30/2024, 8:57 AM.  INDICATIONS:  rectal bleeding and chest pain  PATIENT STATED HISTORY: (As transcribed by Technologist)  rectal bleeding and chest pain.    FINDINGS:  Tunneled right internal jugular hemodialysis catheter remains in place.  Stable cardiomegaly with valve prosthesis.  Mild pulmonary vascular congestion and perihilar interstitial edema.  Increased right pleural effusion and right basilar consolidation/atelectasis.            CONCLUSION:  1. Increased right pleural effusion and right basilar atelectasis/consolidation. 2. Stable vascular congestion interstitial edema suggestive of CHF or fluid overload.  LOCATION:  [facility]      Dictated by (CST): Ricki Zaragoza MD on 8/30/2024 at 11:43 PM     Finalized by (CST): Ricki Zaragoza MD on 8/30/2024 at 11:44 PM               MDM      44-year-old gentleman here for evaluation of generally not feeling well over the past few days, missed dialysis 2 days ago.  Also complains of pain around his tunneled subclavian catheter site.  Differential include viral syndrome, line infection, electrolyte abnormality.  Patient is afebrile hemodynamically stable, does not appear volume overloaded, electrolytes normal, chest x-ray with no acute abnormality.  Discussed patient's symptoms with nephrology on-call for patient's nephrologist, agrees with plan for coverage  with IV vancomycin, and admitted for further monitoring evaluation blood cultures obtained.  Discussed the case with infectious disease Dr. Carballo,, request CT of the chest with IV contrast to evaluate for infection around the catheter.  Patient covered with IV vancomycin will be transferred over to [facility] for further monitoring evaluation, is tolerating p.o. here, case endorsed to [facility] hospitalist who agrees with plan for admission  Admission disposition: 9/29/2024  3:42 PM                                        Medical Decision Making      Disposition and Plan     Clinical Impression:  1. Hemodialysis catheter infection, initial encounter ([facility])         Disposition:  Admit  9/29/2024  3:42 pm    Follow-up:  No follow-up provider specified.        Medications Prescribed:  Current Discharge Medication List                            Hospital Problems       Present on Admission  Date Reviewed: 8/8/2024            ICD-10-CM Noted POA    * (Principal) Hemodialysis catheter infection, initial encounter ([facility]) T82.7XXA 9/29/2024 Unknown

## 2024-09-29 NOTE — ED INITIAL ASSESSMENT (HPI)
Seen here yesterday- states feeling worse today- c/o body aches, chill, night sweats- noticed drainage from dialysis cath this am

## 2024-09-30 LAB
ALBUMIN SERPL-MCNC: 4 G/DL (ref 3.2–4.8)
ANION GAP SERPL CALC-SCNC: 13 MMOL/L (ref 0–18)
ATRIAL RATE: 85 BPM
BASOPHILS # BLD AUTO: 0.06 X10(3) UL (ref 0–0.2)
BASOPHILS NFR BLD AUTO: 1.1 %
BUN BLD-MCNC: 74 MG/DL (ref 9–23)
CALCIUM BLD-MCNC: 8.4 MG/DL (ref 8.7–10.4)
CHLORIDE SERPL-SCNC: 108 MMOL/L (ref 98–112)
CO2 SERPL-SCNC: 20 MMOL/L (ref 21–32)
CREAT BLD-MCNC: 8.28 MG/DL
EGFRCR SERPLBLD CKD-EPI 2021: 7 ML/MIN/1.73M2 (ref 60–?)
EOSINOPHIL # BLD AUTO: 0.21 X10(3) UL (ref 0–0.7)
EOSINOPHIL NFR BLD AUTO: 3.7 %
ERYTHROCYTE [DISTWIDTH] IN BLOOD BY AUTOMATED COUNT: 13.2 %
GLUCOSE BLD-MCNC: 155 MG/DL (ref 70–99)
HBV SURFACE AB SER QL: REACTIVE
HBV SURFACE AB SERPL IA-ACNC: 55.57 MIU/ML
HBV SURFACE AG SER-ACNC: <0.1 [IU]/L
HBV SURFACE AG SERPL QL IA: NONREACTIVE
HCT VFR BLD AUTO: 24.9 %
HGB BLD-MCNC: 8.7 G/DL
IMM GRANULOCYTES # BLD AUTO: 0.02 X10(3) UL (ref 0–1)
IMM GRANULOCYTES NFR BLD: 0.4 %
LYMPHOCYTES # BLD AUTO: 1.12 X10(3) UL (ref 1–4)
LYMPHOCYTES NFR BLD AUTO: 19.9 %
MCH RBC QN AUTO: 31.6 PG (ref 26–34)
MCHC RBC AUTO-ENTMCNC: 34.9 G/DL (ref 31–37)
MCV RBC AUTO: 90.5 FL
MONOCYTES # BLD AUTO: 0.56 X10(3) UL (ref 0.1–1)
MONOCYTES NFR BLD AUTO: 9.9 %
NEUTROPHILS # BLD AUTO: 3.67 X10 (3) UL (ref 1.5–7.7)
NEUTROPHILS # BLD AUTO: 3.67 X10(3) UL (ref 1.5–7.7)
NEUTROPHILS NFR BLD AUTO: 65 %
OSMOLALITY SERPL CALC.SUM OF ELEC: 317 MOSM/KG (ref 275–295)
P AXIS: 45 DEGREES
P-R INTERVAL: 196 MS
PHOSPHATE SERPL-MCNC: 6.9 MG/DL (ref 2.4–5.1)
PLATELET # BLD AUTO: 188 10(3)UL (ref 150–450)
POTASSIUM SERPL-SCNC: 4.2 MMOL/L (ref 3.5–5.1)
Q-T INTERVAL: 420 MS
QRS DURATION: 90 MS
QTC CALCULATION (BEZET): 499 MS
R AXIS: 49 DEGREES
RBC # BLD AUTO: 2.75 X10(6)UL
SODIUM SERPL-SCNC: 141 MMOL/L (ref 136–145)
T AXIS: 50 DEGREES
VENTRICULAR RATE: 85 BPM
WBC # BLD AUTO: 5.6 X10(3) UL (ref 4–11)

## 2024-09-30 PROCEDURE — 86706 HEP B SURFACE ANTIBODY: CPT | Performed by: HOSPITALIST

## 2024-09-30 PROCEDURE — 94640 AIRWAY INHALATION TREATMENT: CPT

## 2024-09-30 PROCEDURE — 90935 HEMODIALYSIS ONE EVALUATION: CPT | Performed by: INTERNAL MEDICINE

## 2024-09-30 PROCEDURE — 87340 HEPATITIS B SURFACE AG IA: CPT | Performed by: HOSPITALIST

## 2024-09-30 PROCEDURE — 80069 RENAL FUNCTION PANEL: CPT | Performed by: INTERNAL MEDICINE

## 2024-09-30 PROCEDURE — 5A1D70Z PERFORMANCE OF URINARY FILTRATION, INTERMITTENT, LESS THAN 6 HOURS PER DAY: ICD-10-PCS | Performed by: INTERNAL MEDICINE

## 2024-09-30 PROCEDURE — 85025 COMPLETE CBC W/AUTO DIFF WBC: CPT | Performed by: HOSPITALIST

## 2024-09-30 RX ORDER — HEPARIN SODIUM 1000 [USP'U]/ML
4000 INJECTION, SOLUTION INTRAVENOUS; SUBCUTANEOUS ONCE
Status: COMPLETED | OUTPATIENT
Start: 2024-09-30 | End: 2024-09-30

## 2024-09-30 RX ORDER — HEPARIN SODIUM 1000 [USP'U]/ML
1.5 INJECTION, SOLUTION INTRAVENOUS; SUBCUTANEOUS
Status: DISCONTINUED | OUTPATIENT
Start: 2024-09-30 | End: 2024-10-01

## 2024-09-30 NOTE — CONSULTS
Infectious Disease Initial Consultation      Date of admission: 9/29/2024  1:23 PM     Date of service: 09/30/24 10:08 AM    Consult requested by: Laila Ryder MD     Reason for consult: Dialysis line infection    Chief complaint: Dialysis line infection    History of present illness: Godwin Fonseca is a 44 year old male with history of end-stage renal disease on hemodialysis through hemodialysis  , Presenting to the emergency room with worsening pain and swelling in that area.  He also reported some foul-smelling discharge surrounding his line.  No other constitutional symptoms.  He missed dialysis on Friday because he was not feeling well.  No fevers or chills.    In the emergency room, the patient was afebrile, hemodynamically stable.  Labs revealed BMP consistent with his end stage renal disease.  LFTs were unremarkable.  CBC showed anemia but otherwise was unremarkable.  2 sets of blood cultures obtained, remain negative to date.  Case discussed with infectious disease, the patient was given a dose of IV vancomycin.  He was also given cefazolin.  A CT of the chest was done that did not show any evidence of a tunneled infection or abscesses.    Review of systems:  All other components of the review of systems are negative, except those described in the history of present illness.     Past Medical History:    Asthma ([facility])    Attention deficit hyperactivity disorder (ADHD)    Back problem    Bipolar 1 disorder ([facility])    CKD (chronic kidney disease) stage 3, GFR 30-59 ml/min ([facility])    Dr Meeks    Congestive heart disease ([facility])    COPD (chronic obstructive pulmonary disease) ([facility])    Coronary atherosclerosis    Deep vein thrombosis ([facility])    at age 17 R/T cast    Depression    Diabetes ([facility])    Essential hypertension    3/21 echo: severe concentric LVH with normal EF and no MR or pHTN    Extrinsic asthma, unspecified    Heart attack ([facility])    2016- angiogram- no intervention    Heart valve disease    mitral valve  repair in 1994/    High blood pressure    High cholesterol    History of blood transfusion    History of mitral valve repair    Hyperlipidemia    Low back pain    tight and stiff after sweeping and mopping    LVH (left ventricular hypertrophy)    Migraines    Mixed hyperlipidemia     HDL 38 LDL 97 VLDL 57     Monoclonal gammopathy    IgG kappa     MVP (mitral valve prolapse)    Repair 1994 at [place]; echoes as recently as 3/21 show mild or trivial MR and no stenosis    Neuropathy    Osteoarthritis    hip ,knees    Pneumonia due to organism    Pulmonary embolism (HCC)    Renal disorder    Stroke (HCC)    TIA (transient ischemic attack)    Initial history of left-sided weakness and slurred speech. (+) cocaine. MRI of the brain, CT angiogram of the head and neck, and 2D echo are all unremarkable.     TMJ (dislocation of temporomandibular joint)    Troponin level elevated    Trop 60 60 47 with TIA and no CP: Lexiscan negative with EF 51     Past Surgical History:   Procedure Laterality Date    Av fistula revision, open Left     Colonoscopy N/A 03/26/2023    Procedure: COLONOSCOPY;  Surgeon: Heath Vu MD;  Location:  ENDOSCOPY    Colonoscopy N/A 12/30/2023    Procedure: COLONOSCOPY with cold snare polypectomy and forcep polypectomy;  Surgeon: Ousmane Suarez MD;  Location:  ENDOSCOPY    Colonoscopy & polypectomy  2019    Egd  2019    Duodenitis. Biopsied. EUS for weight loss was negative    Heart surgery      Hernia surgery  08/17/2022    Dr Barnes    Laminectomy,>2 sgmt,lumbar  09/06/2018    L4-L5 Decomp Discectomy ROEM L4-L5    Mitralplasty w cp bypass  1994    [place]: Repair    Repair rotator cuff,chronic Left     torn and had a ruptured bicep    Valve repair  1994    mitral valve     Social History     Socioeconomic History    Marital status:    Tobacco Use    Smoking status: Former     Current packs/day: 0.00     Average packs/day: 1 pack/day for 27.0 years (27.0 ttl pk-yrs)     Types:  Cigarettes     Start date: 1995     Quit date: 2022     Years since quittin.5     Passive exposure: Never    Smokeless tobacco: Never   Vaping Use    Vaping status: Never Used   Substance and Sexual Activity    Alcohol use: No    Drug use: No     Social Determinants of Health     Financial Resource Strain: Medium Risk (2024)    Received from [facility]    Overall Financial Resource Strain (CARDIA)     Difficulty of Paying Living Expenses: Somewhat hard   Food Insecurity: No Food Insecurity (2024)    Food Insecurity     Food Insecurity: Never true   Transportation Needs: No Transportation Needs (2024)    Transportation Needs     Lack of Transportation: No   Physical Activity: Inactive (2024)    Received from [facility]    Exercise Vital Sign     Days of Exercise per Week: 0 days     Minutes of Exercise per Session: 0 min   Stress: Stress Concern Present (2024)    Received from [facility]    [employer] - Occupational Stress Questionnaire     Feeling of Stress : Rather much   Social Connections: Unknown (2024)    Received from [facility]    Social Connection and Isolation Panel [NHANES]     Frequency of Communication with Friends and Family: More than three times a week     Frequency of Social Gatherings with Friends and Family: More than three times a week     Attends Mandaeism Services: Never     Active Member of Clubs or Organizations: No     Attends Club or Organization Meetings: Never     Marital Status: Patient unable to answer   Housing Stability: Low Risk  (2024)    Housing Stability     Housing Instability: No     Family History   Problem Relation Age of Onset    Hypertension Father     Alcohol and Other Disorders Associated Father     Substance Abuse Father         cocaine    Dementia Father     Cancer Father         lung    Diabetes Mother     Cancer Mother         multiple myeloma     Hypertension Mother     Anxiety Maternal Aunt     Depression Maternal Aunt     Anxiety Maternal Aunt     Depression Maternal Aunt     Bipolar Disorder Maternal Aunt     Diabetes Maternal Grandmother     Hypertension Maternal Grandmother     Cancer Maternal Grandfather         stomach cancer    Diabetes Maternal Grandfather     Hypertension Maternal Grandfather     Alcohol and Other Disorders Associated Maternal Grandfather     Hypertension Paternal Grandmother     Hypertension Paternal Grandfather     Cancer Sister         uterine and ovarian    Hypertension Sister     Cancer Maternal Uncle         lung    Cancer Paternal Aunt         throat     Reviewed, see above    Medications:    sodium chloride **AND** albumin human    albuterol    ARIPiprazole    buPROPion ER    carvedilol    cloNIDine    dicyclomine    FLUoxetine HCl    hydrALAZINE    isosorbide mononitrate ER    lamoTRIgine    losartan    NIFEdipine ER    ondansetron    sevelamer carbonate    tamsulosin    umeclidinium bromide    heparin    acetaminophen    polyethylene glycol (PEG 3350)    sennosides    bisacodyl    ondansetron    prochlorperazine    ceFAZolin    HYDROcodone-acetaminophen     Allergies:  Allergies   Allergen Reactions    Hydrochlorothiazide RASH and HIVES       Physical Exam:  Vitals:    09/30/24 0800   BP: 120/76   Pulse: 79   Resp: 19   Temp: 97.8 °F (36.6 °C)     Vitals signs and nursing note reviewed.   Constitutional:       Appearance: Normal appearance.   HENT:      Head: Normocephalic and atraumatic.      Mouth: Mucous membranes are moist.   Neck:      Musculoskeletal: Neck supple.   Cardiovascular:      Rate and Rhythm: Normal rate.      Heart sounds: Normal heart sounds. No murmur. No friction rub. No gallop.    Pulmonary:      Effort: Pulmonary effort is normal. No respiratory distress.      Breath sounds: Normal breath sounds. No stridor. No wheezing, rhonchi or rales.   Chest:      Chest wall: Mild tenderness around his dialysis  line; however, redness and drainage have resolved at this point.  Abdominal:      General: Abdomen is flat. There is no distension.      Palpations: Abdomen is soft. There is no mass.      Tenderness: There is no tenderness. There is no guarding or rebound.      Hernia: No hernia is present.   Musculoskeletal:      Right lower leg: No edema.      Left lower leg: No edema.   Skin:     General: Skin is warm and dry.   Neurological:      General: No focal deficit present.      Mental Status: Alert and oriented to person, place, and time.     Laboratory data:  I have independently reviewed all lab results; including old microbiological results.  Lab Results   Component Value Date    WBC 5.6 09/30/2024    HGB 8.7 09/30/2024    HCT 24.9 09/30/2024    .0 09/30/2024    CREATSERUM 8.28 09/30/2024    BUN 74 09/30/2024     09/30/2024    K 4.2 09/30/2024     09/30/2024    CO2 20.0 09/30/2024     09/30/2024    CA 8.4 09/30/2024    ALB 4.0 09/30/2024    ALKPHO 71 09/29/2024    BILT 0.3 09/29/2024    TP 7.2 09/29/2024    AST 14 09/29/2024    ALT 9 09/29/2024    INR 1.06 09/29/2024    PHOS 6.9 09/30/2024    TROPHS 207 09/29/2024        Recent Labs   Lab 09/30/24  0813   RBC 2.75*   HGB 8.7*   HCT 24.9*   MCV 90.5   MCH 31.6   MCHC 34.9   RDW 13.2   NEPRELIM 3.67   WBC 5.6   .0       Microbiology data:  No results found for this visit on 09/29/24.      Radiology:  I have reviewed all imaging data available independently.   CT chest with IV contrast:  No evidence of tunneled line infection or deeper collections    Impression:  Godwin Fonseca is a 44 year old male with     Hemodialysis line site infection, with worsening redness and swelling and drainage  Drainage stopped since starting antibiotics.  Status post vancomycin and cefazolin.  CT scan of the chest without evidence of tunneled infection or deeper collections  End-stage renal disease  On hemodialysis  Antibiotics will be renally  adjusted    Recommendations:     Continue IV vancomycin, pharmacy to dose  Continue cefazolin 1 g daily  Continue to follow-up on blood cultures  Continue to monitor daily labs for antibiotic toxicity  Further recommendations will depend on the above work-up and clinical progress     The plan of care was discussed with the primary hospital team, Laila Ryder MD     Recommendations were also discussed with the patient; all questions were answered.     Thank you for this consultation. Please don't hesitate to call the ID team for questions or any acute changes in patient's clinical condition.    Please note that this report has been produced using speech recognition software and may contain errors related to that system including, but not limited to, errors in grammar, punctuation, and spelling, as well as words and phrases that possibly may have been recognized inappropriately.  If there are any questions or concerns, contact the dictating provider for clarification.    The 21st Century Cures Act makes medical notes like these available to patients in the interest of transparency. Please be advised this is a medical document. Medical documents are intended to carry relevant information, facts as evident, and the clinical opinion of the practitioner. The medical note is intended as peer to peer communication and may appear blunt or direct. It is written in medical language and may contain abbreviations or verbiage that are unfamiliar.     Selam Jesus MD  [facility]. Tel: [phone]. Fax: [phone].     Godwin Fonseca : 1980 MRN: [id number] [facility]: [id number]

## 2024-09-30 NOTE — DIETARY NOTE
[facility]   part of [facility]   CLINICAL NUTRITION    Godwin Fonseca     Admitting diagnosis:  Hemodialysis catheter infection, initial encounter (HCC) [T82.7XXA]    Ht: 188 cm (6' 2\")  Wt: 111.1 kg (245 lb).   Body mass index is 31.46 kg/m².    Wt Readings from Last 6 Encounters:   09/29/24 111.1 kg (245 lb)   09/28/24 111.1 kg (245 lb)   09/16/24 111.1 kg (245 lb)   09/02/24 112.7 kg (248 lb 8 oz)   08/30/24 113.4 kg (250 lb)   08/11/24 108.9 kg (240 lb)      Labs/Meds reviewed    Diet:       Procedures    Regular/General diet Is Patient on Accuchecks? No     Percent Meals Eaten (last 3 days)       None          Pt chart reviewed d/t MST, however pt with stable wt per EMR. Pt sleeping upon visit getting HD. RD will add Nepro daily to max intakes.  Patient reports poor appetite at this time.  Nursing notes reports   intake for last meal.  Tolerating po diet without diarrhea, emesis, or constipation.   No significant weight changes noted.     Patient is at low nutrition risk at this time.    Please consult if patient status changes or nutrition issues arise.    Lacie Reyna RD, LDN  Clinical Dietitian

## 2024-09-30 NOTE — H&P
.CC:   Chief Complaint   Patient presents with    Fatigue    Rash Skin Problem        PCP: Adrian Small MD    History of Present Illness: Patient is a 44 year old male with PMH sig for esrd on HD, htn who presented with feeling poorly and noting problem from dialysis catheter site.     He had noticed drainage from tunneled subclavian catheter site, increased pain and swelling for past several days. Foul appearing dc at home. Missed HD on 9/27 b/c not feeling well. +fevers/chills at home.     PMH  Past Medical History:    Asthma ([facility])    Attention deficit hyperactivity disorder (ADHD)    Back problem    Bipolar 1 disorder (HCC)    CKD (chronic kidney disease) stage 3, GFR 30-59 ml/min ([facility])    Dr Meeks    Congestive heart disease ([facility])    COPD (chronic obstructive pulmonary disease) ([facility])    Coronary atherosclerosis    Deep vein thrombosis ([facility])    at age 17 R/T cast    Depression    Diabetes ([facility])    Essential hypertension    3/21 echo: severe concentric LVH with normal EF and no MR or pHTN    Extrinsic asthma, unspecified    Heart attack (HCC)    2016- angiogram- no intervention    Heart valve disease    mitral valve repair in 1994/    High blood pressure    High cholesterol    History of blood transfusion    History of mitral valve repair    Hyperlipidemia    Low back pain    tight and stiff after sweeping and mopping    LVH (left ventricular hypertrophy)    Migraines    Mixed hyperlipidemia     HDL 38 LDL 97 VLDL 57     Monoclonal gammopathy    IgG kappa     MVP (mitral valve prolapse)    Repair 1994 at [place]; echoes as recently as 3/21 show mild or trivial MR and no stenosis    Neuropathy    Osteoarthritis    hip ,knees    Pneumonia due to organism    Pulmonary embolism (HCC)    Renal disorder    Stroke (HCC)    TIA (transient ischemic attack)    Initial history of left-sided weakness and slurred speech. (+) cocaine. MRI of the brain, CT angiogram of the head and neck, and 2D echo are all  unremarkable.     TMJ (dislocation of temporomandibular joint)    Troponin level elevated    Trop 60 60 47 with TIA and no CP: Lexiscan negative with EF 51        PSH  Past Surgical History:   Procedure Laterality Date    Av fistula revision, open Left     Colonoscopy N/A 03/26/2023    Procedure: COLONOSCOPY;  Surgeon: Heath Vu MD;  Location:  ENDOSCOPY    Colonoscopy N/A 12/30/2023    Procedure: COLONOSCOPY with cold snare polypectomy and forcep polypectomy;  Surgeon: Ousmane Suarez MD;  Location:  ENDOSCOPY    Colonoscopy & polypectomy  2019    Egd  2019    Duodenitis. Biopsied. EUS for weight loss was negative    Heart surgery      Hernia surgery  08/17/2022    Dr Barnes    Laminectomy,>2 sgmt,lumbar  09/06/2018    L4-L5 Decomp Discectomy ROEM L4-L5    Mitralplasty w cp bypass  1994    [place]: Repair    Repair rotator cuff,chronic Left     torn and had a ruptured bicep    Valve repair  1994    mitral valve        ALL:  Allergies   Allergen Reactions    Hydrochlorothiazide RASH and HIVES        Home Medications:  Outpatient Medications Marked as Taking for the 9/29/24 encounter (Hospital Encounter)   Medication Sig Dispense Refill    ondansetron 4 MG Oral Tablet Dispersible Take 1 tablet (4 mg total) by mouth every 4 (four) hours as needed for Nausea. 10 tablet 0    HYDROcodone-acetaminophen (NORCO)  MG Oral Tab Take 1 tablet by mouth every 6 (six) hours as needed for Pain. 20 tablet 0    cloNIDine 0.1 MG Oral Tab Take 1 tablet (0.1 mg total) by mouth 2 (two) times daily. 60 tablet 0    levoFLOXacin 250 MG Oral Tab TAKE 2 TABLETs BY MOUTH one time then take 1 tablet every 48 hours for 30 days      albuterol 108 (90 Base) MCG/ACT Inhalation Aero Soln Inhale 2 puffs into the lungs every 6 (six) hours as needed for Wheezing.      tiotropium 18 MCG Inhalation Cap Inhale 1 capsule (18 mcg total) into the lungs daily.      tamsulosin 0.4 MG Oral Cap Take 1 capsule (0.4 mg total) by mouth daily.       ARIPiprazole 15 MG Oral Tab Take 1 tablet (15 mg total) by mouth daily.      isosorbide mononitrate ER 30 MG Oral Tablet 24 Hr Take 1 tablet (30 mg total) by mouth daily. Hold if systolic blood pressure <130      Lisdexamfetamine Dimesylate 60 MG Oral Cap Take 70 mg by mouth every morning.      buPROPion  MG Oral Tablet 24 Hr Take 1 tablet (150 mg total) by mouth daily.      lamoTRIgine (LAMICTAL) 150 MG Oral Tab Take 1 tablet (150 mg total) by mouth daily. 7 tablet 0    FLUoxetine HCl 40 MG Oral Cap Take 1 capsule (40 mg total) by mouth daily. 7 capsule 0    RENVELA 800 MG Oral Tab Take 1 tablet (800 mg total) by mouth 3 (three) times daily with meals.      losartan 100 MG Oral Tab Take 1 tablet (100 mg total) by mouth daily. Hold if systolic blood pressure <130      hydrALAZINE 50 MG Oral Tab Take 1 tablet (50 mg total) by mouth in the morning and 1 tablet (50 mg total) before bedtime. Hold if systolic blood pressure <130. 30 tablet 0    NIFEdipine ER 60 MG Oral Tablet 24 Hr Take 1 tablet (60 mg total) by mouth in the morning and 1 tablet (60 mg total) before bedtime. Hold if systolic blood pressure <130.      carvedilol 25 MG Oral Tab Take 1 tablet (25 mg total) by mouth in the morning and 1 tablet (25 mg total) in the evening. Take with meals. 60 tablet 6    Fenofibrate 134 MG Oral Cap Take 1 capsule by mouth nightly. 90 capsule 1    Fluticasone Propionate 50 MCG/ACT Nasal Suspension SPRAY ONCE INTO EACH NOSTRIL BID PRN 15.8 mL 0         Soc Hx  Social History     Tobacco Use    Smoking status: Former     Current packs/day: 0.00     Average packs/day: 1 pack/day for 27.0 years (27.0 ttl pk-yrs)     Types: Cigarettes     Start date: 1995     Quit date: 2022     Years since quittin.5     Passive exposure: Never    Smokeless tobacco: Never   Substance Use Topics    Alcohol use: No        Fam Hx  Family History   Problem Relation Age of Onset    Hypertension Father     Alcohol and Other Disorders  Associated Father     Substance Abuse Father         cocaine    Dementia Father     Cancer Father         lung    Diabetes Mother     Cancer Mother         multiple myeloma    Hypertension Mother     Anxiety Maternal Aunt     Depression Maternal Aunt     Anxiety Maternal Aunt     Depression Maternal Aunt     Bipolar Disorder Maternal Aunt     Diabetes Maternal Grandmother     Hypertension Maternal Grandmother     Cancer Maternal Grandfather         stomach cancer    Diabetes Maternal Grandfather     Hypertension Maternal Grandfather     Alcohol and Other Disorders Associated Maternal Grandfather     Hypertension Paternal Grandmother     Hypertension Paternal Grandfather     Cancer Sister         uterine and ovarian    Hypertension Sister     Cancer Maternal Uncle         lung    Cancer Paternal Aunt         throat       Review of Systems  Comprehensive ROS reviewed and negative except for what's stated above.       OBJECTIVE:  /76 (BP Location: Right arm)   Pulse 79   Temp 97.8 °F (36.6 °C) (Oral)   Resp 19   Ht 6' 2\" (1.88 m)   Wt 245 lb (111.1 kg)   SpO2 96%   BMI 31.46 kg/m²     Gen- NAD, appears stated age  HEENT- NCAT, anicteric sclera, MMM, OP clear  Lymph- no cervical LAD  CV- RRR no murmurs. No PAMELA  Lungs- CTAB, good respiratory effort  Abd- soft, ntnd, no organomegaly, BS+  Derm- ttp along subclavian site but no specific induration/swelling/drainage seen currently.  MSK- good muscle strength and tone, no joint swelling  Neuro- A&OX3, no focal deficits      Diagnostic Data:    CBC/Chem  Recent Labs   Lab 09/28/24  0637 09/29/24  1339 09/30/24  0813   WBC 7.2 6.9 5.6   HGB 10.8* 9.2* 8.7*   MCV 92.6 94.9 90.5   .0 235.0 188.0   INR  --  1.06  --        Recent Labs   Lab 09/28/24  0637 09/29/24  1339 09/30/24  0813   * 139 141   K 4.9 4.0 4.2    106 108   CO2 20.0* 22.0 20.0*   BUN 81* 78* 74*   CREATSERUM 9.94* 9.38* 8.28*   * 120* 155*   CA 8.5 7.8* 8.4*   PHOS  --   --   6.9*       Recent Labs   Lab 09/28/24  0637 09/29/24  1339 09/30/24  0813   ALT 12* 9*  --    AST 64* 14*  --    ALB 3.9 3.7 4.0       No results for input(s): \"TROP\" in the last 168 hours.    Additional Diagnostics: personally reviewed EKG- nsr, no st/t abn    CXR: image personally reviewed- clear    Radiology: CT CHEST(CONTRAST ONLY) (CPT=71260)    Result Date: 9/29/2024  PROCEDURE:  CT CHEST(CONTRAST ONLY) (CPT=71260)  COMPARISON:  [facility], CT, CTA CHEST + CT ABD (W) + CT PEL (W) SH(CPT=71275/53455), 11/23/2023, 4:31 PM.  INDICATIONS:  ID request - possible tunneled R subclavian catheter infection.  TECHNIQUE:  CT images were obtained with non-ionic intravenous contrast material. Dose reduction techniques were used. Dose information is transmitted to the ACR (American College of Radiology) NRDR (National Radiology Data Registry) which includes the Dose Index Registry.  PATIENT STATED HISTORY:(As transcribed by Technologist)   body aches, chill, night sweats- noticed drainage from dialysis cath this am   CONTRAST USED:  75cc of Isovue 370  FINDINGS:  LUNGS:  Multifocal dependent wedge-shaped areas of consolidation in lower lobes are noted and appears similar to previous study likely indicating chronic postinflammatory scar and atelectasis.  There is emphysematous change noted in upper lobes. VASCULATURE:  No visible pulmonary arterial thrombus or attenuation.  BASIL:  Right hilar lymph node measures 2.8 x 1.7 cm (previously 2.5 x 1.5 cm). MEDIASTINUM:  Low right paratracheal lymph node series 2, image 41 measures 2.2 x 1.4 cm (previously 2.2 x 1.5 cm). CARDIAC:  Metallic densities within the mitral valve are presumably from prior surgical reconstruction or replacement of the valve. PLEURA:  No mass or effusion.  THORACIC AORTA:  No aneurysm.  CHEST WALL:  Right internal jugular tunneled dialysis catheter is noted.  The tip of this is in SVC.  The entirety of the catheter is not included on the chest CT.  There  is no acute abnormality associated with the catheter within the limits of this study.  LIMITED ABDOMEN:  Limited images of the upper abdomen are unremarkable.  BONES:  No bony lesion or fracture.             CONCLUSION:  1. Right hilar and right mediastinal lymph nodes have increased slightly in size and are probably reactive. 2. Wedge-shaped areas of density in the right lung are unchanged and probably a combination of chronic scar and atelectasis. 3. Residual prior mitral valve surgery is noted. 4. There is otherwise no acute CT finding.   LOCATION:  [facility]   Dictated by (CST): Adrian Blevins MD on 9/29/2024 at 5:05 PM     Finalized by (CST): Adrian Blevins MD on 9/29/2024 at 5:09 PM       XR CHEST AP PORTABLE  (CPT=71045)    Result Date: 9/29/2024  PROCEDURE:  XR CHEST AP PORTABLE  (CPT=71045)  TECHNIQUE:  AP chest radiograph was obtained.  COMPARISON:  [facility], XR, XR CHEST AP PORTABLE  (CPT=71045), 9/15/2024, 8:01 PM.  INDICATIONS:  fatigue,pain and oozing from port nausea and vomiting . seen yesterday for same symptoms  PATIENT STATED HISTORY: (As transcribed by Technologist)  Pt c/o fatigue and nausea. Hx of heart surgery.    FINDINGS:  Valve prosthesis noted.  Cardiomegaly with normal pulmonary vascularity.  Small right pleural effusion.  Right dialysis catheter tips in the SVC.  No evidence of pneumothorax.            CONCLUSION:  No lobar pneumonia or overt congestive failure.  Small right pleural effusion.   LOCATION:  [id number]      Dictated by (CST): Jj Conley MD on 9/29/2024 at 2:44 PM     Finalized by (CST): Jj Conley MD on 9/29/2024 at 2:44 PM         ASSESSMENT / PLAN:     44-year-old man with history of ESRD on HD who presented with feeling poorly and concern for dialysis catheter infection.    Concern dialysis catheter infection  - Cultures taken from site cultures also pending  - Seen by infectious disease, started on empiric antibiotics  -- CT chest without evidence of deeper  infection    Hypertension  Chronic diastolic HF  CAD  - Continue Coreg hydralazine nifedipine clonidine imdur    Anxiety/depression  --cont abilify, wellbutrin, prozac    Subcutaneous heparin, sCDs    **Certification      PHYSICIAN Certification of Need for Inpatient Hospitalization - Initial Certification    Patient will require inpatient services that will reasonably be expected to span two midnight's based on the clinical documentation in H+P.   Based on patients current state of illness, I anticipate that, after discharge, patient will require TBD.      Laila Ryder MD  [facility]  Pager [phone]  Answering Service number: [phone]

## 2024-09-30 NOTE — CONSULTS
[facility]   part of [facility]    Report of Consultation    Godwin Fonseca Patient Status:  Inpatient    1980 MRN [id number]   Location [facility] 3NE-A Attending Laila Ryder MD   Hosp Day # 1 PCP Adrian Small MD     Date of consult: 2024    REASON FOR CONSULT:     ESRD    HISTORY OF PRESENT ILLNESS:     Godwin Fonseca is a a(n) 44 year old male w ho ESRD on HD MWF, hyperaldosteronism, DM, bipolar disorder who presented with pain and discharge at dialysis catheter site. Notes had HD wed, but missed Friday. Used to follow w Dr Long. At [facility] [place], [place]     Had L AVF but not mature yet per notes needs rest per vascular, fu as outpt. R TDC placed 24 lasty admit    REVIEW OF SYSTEMS:     Please see HPI for pertinent positives. 10 point review of systems otherwise reviewed and negative.     HISTORY:     Past Medical History:    Asthma (HCC)    Attention deficit hyperactivity disorder (ADHD)    Back problem    Bipolar 1 disorder (HCC)    CKD (chronic kidney disease) stage 3, GFR 30-59 ml/min ([facility])    Dr Meeks    Congestive heart disease (HCC)    COPD (chronic obstructive pulmonary disease) (HCC)    Coronary atherosclerosis    Deep vein thrombosis ([facility])    at age 17 R/T cast    Depression    Diabetes (HCC)    Essential hypertension    3/21 echo: severe concentric LVH with normal EF and no MR or pHTN    Extrinsic asthma, unspecified    Heart attack (HCC)    - angiogram- no intervention    Heart valve disease    mitral valve repair in /    High blood pressure    High cholesterol    History of blood transfusion    History of mitral valve repair    Hyperlipidemia    Low back pain    tight and stiff after sweeping and mopping    LVH (left ventricular hypertrophy)    Migraines    Mixed hyperlipidemia     HDL 38 LDL 97 VLDL 57     Monoclonal gammopathy    IgG kappa     MVP (mitral valve prolapse)    Repair  at [place]; echoes as recently as 3/21 show mild or trivial MR and no  stenosis    Neuropathy    Osteoarthritis    hip ,knees    Pneumonia due to organism    Pulmonary embolism (HCC)    Renal disorder    Stroke (HCC)    TIA (transient ischemic attack)    Initial history of left-sided weakness and slurred speech. (+) cocaine. MRI of the brain, CT angiogram of the head and neck, and 2D echo are all unremarkable.     TMJ (dislocation of temporomandibular joint)    Troponin level elevated    Trop 60 60 47 with TIA and no CP: Lexiscan negative with EF 51     Past Surgical History:   Procedure Laterality Date    Av fistula revision, open Left     Colonoscopy N/A 03/26/2023    Procedure: COLONOSCOPY;  Surgeon: Heath Vu MD;  Location:  ENDOSCOPY    Colonoscopy N/A 12/30/2023    Procedure: COLONOSCOPY with cold snare polypectomy and forcep polypectomy;  Surgeon: Ousmane Suarez MD;  Location:  ENDOSCOPY    Colonoscopy & polypectomy  2019    Egd  2019    Duodenitis. Biopsied. EUS for weight loss was negative    Heart surgery      Hernia surgery  08/17/2022    Dr Barnes    Laminectomy,>2 sgmt,lumbar  09/06/2018    L4-L5 Decomp Discectomy RO[facility] L4-L5    Mitralplasty w cp bypass  1994    [place]: Repair    Repair rotator cuff,chronic Left     torn and had a ruptured bicep    Valve repair  1994    mitral valve     Family History   Problem Relation Age of Onset    Hypertension Father     Alcohol and Other Disorders Associated Father     Substance Abuse Father         cocaine    Dementia Father     Cancer Father         lung    Diabetes Mother     Cancer Mother         multiple myeloma    Hypertension Mother     Anxiety Maternal Aunt     Depression Maternal Aunt     Anxiety Maternal Aunt     Depression Maternal Aunt     Bipolar Disorder Maternal Aunt     Diabetes Maternal Grandmother     Hypertension Maternal Grandmother     Cancer Maternal Grandfather         stomach cancer    Diabetes Maternal Grandfather     Hypertension Maternal Grandfather     Alcohol and Other Disorders Associated  Maternal Grandfather     Hypertension Paternal Grandmother     Hypertension Paternal Grandfather     Cancer Sister         uterine and ovarian    Hypertension Sister     Cancer Maternal Uncle         lung    Cancer Paternal Aunt         throat      reports that he quit smoking about 2 years ago. His smoking use included cigarettes. He started smoking about 29 years ago. He has a 27 pack-year smoking history. He has never been exposed to tobacco smoke. He has never used smokeless tobacco. He reports that he does not drink alcohol and does not use drugs.    ALLERGIES:     Allergies   Allergen Reactions    Hydrochlorothiazide RASH and HIVES       MEDICATIONS:       Current Facility-Administered Medications:     vancomycin (Vancocin) 2.25 g in sodium chloride 0.9% 500 mL IVPB premix, 25 mg/kg, Intravenous, Once    GIVE POST-DIALYSIS vancomycin (Vancocin) 1,000 mg in sodium chloride 0.9% 250 mL IVPB-ADDV, 10 mg/kg, Intravenous, See Admin Instructions (RX holding)    heparin (Porcine) 1000 UNIT/ML injection 1,500 Units, 1.5 mL, Intracatheter, Once    sodium chloride 0.9 % IV bolus 100 mL, 100 mL, Intravenous, Q30 Min PRN **AND** albumin human (Albumin) 25% injection 25 g, 25 g, Intravenous, PRN Dialysis    albuterol (Ventolin HFA) 108 (90 Base) MCG/ACT inhaler 2 puff, 2 puff, Inhalation, Q6H PRN    ARIPiprazole (Abilify) tab 15 mg, 15 mg, Oral, Daily    buPROPion ER (Wellbutrin XL) 24 hr tab 150 mg, 150 mg, Oral, Daily    carvedilol (Coreg) tab 25 mg, 25 mg, Oral, BID with meals    cloNIDine (Catapres) tab 0.1 mg, 0.1 mg, Oral, BID    dicyclomine (Bentyl) cap 10 mg, 10 mg, Oral, TID PRN    FLUoxetine (PROzac) cap 40 mg, 40 mg, Oral, Daily    hydrALAZINE (Apresoline) tab 50 mg, 50 mg, Oral, BID    isosorbide mononitrate ER (Imdur) 24 hr tab 30 mg, 30 mg, Oral, Daily    lamoTRIgine (LaMICtal) tab 150 mg, 150 mg, Oral, Daily    losartan (Cozaar) tab 100 mg, 100 mg, Oral, Daily    NIFEdipine ER (Procardia-XL) 24 hr tab 60 mg,  60 mg, Oral, BID    ondansetron (Zofran-ODT) disintegrating tab 4 mg, 4 mg, Oral, Q4H PRN    sevelamer carbonate (Renvela) tab 800 mg, 800 mg, Oral, TID CC    tamsulosin (Flomax) cap 0.4 mg, 0.4 mg, Oral, Daily    umeclidinium bromide (Incruse Ellipta) 62.5 MCG/ACT inhaler 1 puff, 1 puff, Inhalation, Daily    heparin (Porcine) 5000 UNIT/ML injection 5,000 Units, 5,000 Units, Subcutaneous, Q8H MARTHA    acetaminophen (Tylenol Extra Strength) tab 500 mg, 500 mg, Oral, Q4H PRN    polyethylene glycol (PEG 3350) (Miralax) 17 g oral packet 17 g, 17 g, Oral, Daily PRN    sennosides (Senokot) tab 17.2 mg, 17.2 mg, Oral, Nightly PRN    bisacodyl (Dulcolax) 10 MG rectal suppository 10 mg, 10 mg, Rectal, Daily PRN    ondansetron (Zofran) 4 MG/2ML injection 4 mg, 4 mg, Intravenous, Q6H PRN    prochlorperazine (Compazine) 10 MG/2ML injection 5 mg, 5 mg, Intravenous, Q8H PRN    ceFAZolin (Ancef) 1 g in dextrose 5% 100mL IVPB-ADD, 1 g, Intravenous, Q24H    HYDROcodone-acetaminophen (Norco)  MG per tab 1 tablet, 1 tablet, Oral, Q4H PRN  No current outpatient medications on file.         PHYSICAL EXAM:     Vital Signs: BP (!) 159/98 (BP Location: Right arm)   Pulse 85   Temp 98.2 °F (36.8 °C) (Oral)   Resp 20   Ht 6' 2\" (1.88 m)   Wt 245 lb (111.1 kg)   SpO2 99%   BMI 31.46 kg/m²   Temp (24hrs), Av.4 °F (36.9 °C), Min:97.8 °F (36.6 °C), Max:98.8 °F (37.1 °C)       Intake/Output Summary (Last 24 hours) at 2024 1554  Last data filed at 2024 0650  Gross per 24 hour   Intake --   Output 700 ml   Net -700 ml     Wt Readings from Last 3 Encounters:   24 245 lb (111.1 kg)   24 245 lb (111.1 kg)   24 245 lb (111.1 kg)       General: NAD  HEENT: NCAT, MMM, EOMI  Neck: Supple   Cardiac: Regular rate and rhythm   Lungs: CTAB   Abdomen: Soft, non-tender, non-distended   : No CVA tenderness  Extremities: no leg edema  Neurologic/Psych: mentating well, no asterixis  Skin: No rashes    LABORATORY DATA:        Lab Results   Component Value Date     (H) 09/30/2024    BUN 74 (H) 09/30/2024    BUNCREA 13.0 03/07/2022    CREATSERUM 8.28 (H) 09/30/2024    ANIONGAP 13 09/30/2024    GFR 59 (L) 01/04/2018    GFRNAA 30 (L) 07/12/2022    GFRAA 35 (L) 07/12/2022    CA 8.4 (L) 09/30/2024    OSMOCALC 317 (H) 09/30/2024    ALKPHO 71 09/29/2024    AST 14 (L) 09/29/2024    ALT 9 (L) 09/29/2024    BILT 0.3 09/29/2024    TP 7.2 09/29/2024    ALB 4.0 09/30/2024    GLOBULIN 3.5 09/29/2024     09/30/2024    K 4.2 09/30/2024     09/30/2024    CO2 20.0 (L) 09/30/2024     Lab Results   Component Value Date    WBC 5.6 09/30/2024    RBC 2.75 (L) 09/30/2024    HGB 8.7 (L) 09/30/2024    HCT 24.9 (L) 09/30/2024    .0 09/30/2024    MPV 11.5 12/14/2012    MCV 90.5 09/30/2024    MCH 31.6 09/30/2024    MCHC 34.9 09/30/2024    RDW 13.2 09/30/2024    NEPRELIM 3.67 09/30/2024    NEPERCENT 65.0 09/30/2024    LYPERCENT 19.9 09/30/2024    MOPERCENT 9.9 09/30/2024    EOPERCENT 3.7 09/30/2024    BAPERCENT 1.1 09/30/2024    NE 3.67 09/30/2024    LYMABS 1.12 09/30/2024    MOABSO 0.56 09/30/2024    EOABSO 0.21 09/30/2024    BAABSO 0.06 09/30/2024     Lab Results   Component Value Date    MALBP 167.00 05/24/2023    CREUR 142.00 05/24/2023    CREUR 142.00 05/24/2023     Lab Results   Component Value Date    COLORUR Colorless (A) 09/16/2024    CLARITY Clear 09/16/2024    SPECGRAVITY 1.012 09/16/2024    GLUUR Normal 09/16/2024    BILUR Negative 09/16/2024    KETUR Negative 09/16/2024    BLOODURINE 3+ (A) 09/16/2024    PHURINE 6.0 09/16/2024    PROUR 50 (A) 09/16/2024    UROBILINOGEN Normal 09/16/2024    NITRITE Negative 09/16/2024    LEUUR Negative 09/16/2024    WBCUR 1-5 09/16/2024    RBCUR 0-2 09/16/2024    EPIUR Few (A) 09/16/2024    BACUR None Seen 09/16/2024    CAOXUR Occasional (A) 04/20/2018    HYLUR Present (A) 05/14/2019         IMAGING:     All imaging studies personally reviewed.    CT CHEST(CONTRAST ONLY)  (CPT=71260)    Result Date: 9/29/2024  CONCLUSION:  1. Right hilar and right mediastinal lymph nodes have increased slightly in size and are probably reactive. 2. Wedge-shaped areas of density in the right lung are unchanged and probably a combination of chronic scar and atelectasis. 3. Residual prior mitral valve surgery is noted. 4. There is otherwise no acute CT finding.   LOCATION:  [facility]   Dictated by (CST): Adrian Blevins MD on 9/29/2024 at 5:05 PM     Finalized by (CST): Adrian Blevins MD on 9/29/2024 at 5:09 PM          ASSESSMENT/PLAN:   Godwin Fonseca is a a(n) 44 year old male w ho ESRD on HD MWF, hyperaldosteronism, DM, bipolar disorder who presented with pain and discharge at dialysis catheter site.    ESRD on HD  -- on HD MWF, but missed Friday   -- plan for HD today. 3K, 2.5Ca, UF as tolerated. Per RN had high arterial pressures that did not resolve w switching the lines, ran at -375.   -- renally dose meds. Avoid morphine    Anemia in ESRD  -- epo if BPs ok and hemoglobin < 10     Hyperphosphatemia  -- sevelamer     HTN  -- continue home meds    HD catheter ?infection/discharge  -- sp CT chest without evidence of tunneled infection or deeper collection. On abx. Sp cultures. per ID appreciated     D/w RN and Dr Jesus     Thank you for allowing me to participate in the care of your patient. Please do not hesitate to contact me with concerns or questions.    Samaria Nava MD  [facility]ph[facility]

## 2024-09-30 NOTE — PHYSICAL THERAPY NOTE
Reviewed pt's chart and discuss pt with RN. At this time pt is undergoing dialysis. PT will re-attempt when appropriate.

## 2024-09-30 NOTE — PROGRESS NOTES
[facility] Pharmacy Dosing Service      Initial Pharmacokinetic Consult for Vancomycin Dosing     Godwin Fonseca is a 44 year old male who is being initiated on vancomycin therapy for bacteremia.  Pharmacy has been asked to dose vancomycin by Dr. Jesus.  The initial treatment and monitoring approach will be non-AUC strategy.        Weight and Temperature:    Wt Readings from Last 1 Encounters:   24 111.1 kg (245 lb)        Temp Readings from Last 1 Encounters:   24 97.8 °F (36.6 °C) (Oral)      Labs:   Recent Labs   Lab 24  0637 24  1339 24  0813   CREATSERUM 9.94* 9.38* 8.28*      Estimated Creatinine Clearance: 13 mL/min (A) (based on SCr of 8.28 mg/dL (H)).     Recent Labs   Lab 24  0637 24  1339 24  0813   WBC 7.2 6.9 5.6          The Pharmacokinetic Target is:    Trough/random 15-20 mg/L (applies to IV dosing in HD and IP dosing in APD)    Renal Dosing Considerations:    Home Regimen: , ,      Assessment/Plan:   Initial/Loading dose: Will receive 2250 mg IV (25 mg/kg, capped at 2250 mg) x 1 loading dose.      Maintenance dose: Pharmacy will dose vancomycin at 1000 mg IV after each dialysis    Monitorin) Plan for vancomycin trough to be obtained prior to the 3rd HD session    2) Pharmacy will order SCr as clinically indicated to assess renal function.    3) Pharmacy will monitor for toxicity and efficacy, adjust vancomycin dose and/or frequency, and order vancomycin levels as appropriate per the Pharmacy and Therapeutics Committee approved protocol until discontinuation of the medication.       We appreciate the opportunity to assist in the care of this patient.     Leah Terry, PharmD  2024  10:35 AM  [facility]  Pharmacy Extension: [phone]

## 2024-09-30 NOTE — PLAN OF CARE
Alert and oriented x4. VSS. On RA. . Telemetry monitoring. Tolerating diet. Voiding without difficulty. Pain controlled with PRN medication. Gauze dressing to right permacath, C/D/I. IV abx. Pt receives dialysis M,W,F. Plan for dialysis tomorrow.     no

## 2024-09-30 NOTE — PLAN OF CARE
Assumed pt care this morning at 0730.   Pt is A&OX4.   On room air, lungs sounds clear. VSS.  Tele: NSR  Pt had dialysis today, 2L removed. Prior to starting dialysis the nephrologist and infectious diseases MD approved for dialysis. Per ID to collect culture at site if discharge present, site was clean and no drainage present.   L arm precautions for AV fistula.   R tunneled cath dressing changed by dialysis RN. Dressing C/D/I.  Heparin subcutaneous for VTE prophylaxis.    On regular diet.   SBA to the bathroom. Continent of bladder and bowel.   PT to see pt.    R AC PIV infusing antibiotics.   Bed in lowest position, call light within reach, bed alarm on.    Problem: PAIN - ADULT  Goal: Verbalizes/displays adequate comfort level or patient's stated pain goal  Description: INTERVENTIONS:  - Encourage pt to monitor pain and request assistance  - Assess pain using appropriate pain scale  - Administer analgesics based on type and severity of pain and evaluate response  - Implement non-pharmacological measures as appropriate and evaluate response  - Consider cultural and social influences on pain and pain management  - Manage/alleviate anxiety  - Utilize distraction and/or relaxation techniques  - Monitor for opioid side effects  - Notify MD/LIP if interventions unsuccessful or patient reports new pain  - Anticipate increased pain with activity and pre-medicate as appropriate  Outcome: Progressing     Problem: SAFETY ADULT - FALL  Goal: Free from fall injury  Description: INTERVENTIONS:  - Assess pt frequently for physical needs  - Identify cognitive and physical deficits and behaviors that affect risk of falls.  - [place] fall precautions as indicated by assessment.  - Educate pt/family on patient safety including physical limitations  - Instruct pt to call for assistance with activity based on assessment  - Modify environment to reduce risk of injury  - Provide assistive devices as appropriate  - Consider OT/PT  consult to assist with strengthening/mobility  - Encourage toileting schedule  Outcome: Progressing     Problem: DISCHARGE PLANNING  Goal: Discharge to home or other facility with appropriate resources  Description: INTERVENTIONS:  - Identify barriers to discharge w/pt and caregiver  - Include patient/family/discharge partner in discharge planning  - Arrange for needed discharge resources and transportation as appropriate  - Identify discharge learning needs (meds, wound care, etc)  - Arrange for interpreters to assist at discharge as needed  - Consider post-discharge preferences of patient/family/discharge partner  - Complete POLST form as appropriate  - Assess patient's ability to be responsible for managing their own health  - Refer to Case Management Department for coordinating discharge planning if the patient needs post-hospital services based on physician/LIP order or complex needs related to functional status, cognitive ability or social support system  Outcome: Progressing

## 2024-10-01 VITALS
BODY MASS INDEX: 31.44 KG/M2 | SYSTOLIC BLOOD PRESSURE: 135 MMHG | HEIGHT: 74 IN | HEART RATE: 89 BPM | RESPIRATION RATE: 19 BRPM | OXYGEN SATURATION: 94 % | WEIGHT: 245 LBS | DIASTOLIC BLOOD PRESSURE: 95 MMHG | TEMPERATURE: 98 F

## 2024-10-01 LAB — VANCOMYCIN SERPL-MCNC: 40.3 UG/ML (ref ?–40)

## 2024-10-01 PROCEDURE — 80202 ASSAY OF VANCOMYCIN: CPT | Performed by: INTERNAL MEDICINE

## 2024-10-01 RX ORDER — CEFAZOLIN 2 G/1
2 INJECTION, POWDER, FOR SOLUTION INTRAVENOUS
Qty: 4 G | Refills: 0 | Status: SHIPPED | OUTPATIENT
Start: 2024-10-02 | End: 2024-10-10

## 2024-10-01 RX ORDER — LISDEXAMFETAMINE DIMESYLATE 70 MG/1
70 CAPSULE ORAL EVERY MORNING
COMMUNITY
Start: 2024-09-19

## 2024-10-01 RX ORDER — VANCOMYCIN/0.9 % SOD CHLORIDE 1 G/100 ML
1 PLASTIC BAG, INJECTION (ML) INTRAVENOUS
Qty: 6 EACH | Refills: 0 | Status: SHIPPED | OUTPATIENT
Start: 2024-10-02 | End: 2024-10-16

## 2024-10-01 RX ORDER — MEMANTINE HYDROCHLORIDE 5 MG/1
5 TABLET ORAL 2 TIMES DAILY
COMMUNITY
Start: 2024-09-24

## 2024-10-01 RX ORDER — CEFAZOLIN 2 G/1
2 INJECTION, POWDER, FOR SOLUTION INTRAVENOUS
Qty: 4 G | Refills: 0 | Status: SHIPPED | OUTPATIENT
Start: 2024-10-07 | End: 2024-10-15

## 2024-10-01 NOTE — PAYOR COMM NOTE
ADMISSION REVIEW     Payor: [employer]  Subscriber #:  [id number]  Authorization Number: [id number]    Admit date: 9/29/24  Admit time:  5:45 PM       REVIEW DOCUMENTATION:     ED Provider Notes        ED Provider Notes signed by Mckinley Macedo MD at 9/29/2024  7:35 PM       Author: Mckinley Macedo MD Service: -- Author Type: Physician    Filed: 9/29/2024  7:35 PM Date of Service: 9/29/2024  3:42 PM Status: Signed    : Mckinley Macedo MD (Physician)           Patient Seen in: [facility] [place]      History     Chief Complaint   Patient presents with    Fatigue    Rash Skin Problem     Stated Complaint: fatigue,pain and oozing from port nausea and vomiting . seen yesterday for same*    Subjective:   HPI    44-year-old gentleman history of ESRD on Monday Wednesday Friday dialysis, here for evaluation pain and some drainage from his tunneled subclavian catheter site.  States this been in place for many months, over the past few days has noticed an increase in pain and swelling in the area.  States today he noted some foul appearing discharge on the antibiotic disc over the site itself.  States he has chills at home denies any fevers any specific cough or cold symptoms.  States he missed dialysis on Friday 2 days ago because he was not feeling well.  Did have some vomiting intermittently over the past few days but has tolerated p.o. since last episode.  Was seen in the ER yesterday, had unremarkable labs, states he continues to feel poorly.  No other acute complaints.    Objective:   Past Medical History:    Asthma ([facility])    Attention deficit hyperactivity disorder (ADHD)    Back problem    Bipolar 1 disorder ([facility])    CKD (chronic kidney disease) stage 3, GFR 30-59 ml/min ([facility])    Dr Meeks    Congestive heart disease (HCC)    COPD (chronic obstructive pulmonary disease) ([facility])    Coronary atherosclerosis    Deep vein thrombosis ([facility])    at age 17 R/T cast    Depression     Diabetes (HCC)    Essential hypertension    3/21 echo: severe concentric LVH with normal EF and no MR or pHTN    Extrinsic asthma, unspecified    Heart attack (HCC)    2016- angiogram- no intervention    Heart valve disease    mitral valve repair in 1994/    High blood pressure    High cholesterol    History of blood transfusion    History of mitral valve repair    Hyperlipidemia    Low back pain    tight and stiff after sweeping and mopping    LVH (left ventricular hypertrophy)    Migraines    Mixed hyperlipidemia     HDL 38 LDL 97 VLDL 57     Monoclonal gammopathy    IgG kappa     MVP (mitral valve prolapse)    Repair 1994 at [facility]; echoes as recently as 3/21 show mild or trivial MR and no stenosis    Neuropathy    Osteoarthritis    hip ,knees    Pneumonia due to organism    Pulmonary embolism (HCC)    Renal disorder    Stroke (HCC)    TIA (transient ischemic attack)    Initial history of left-sided weakness and slurred speech. (+) cocaine. MRI of the brain, CT angiogram of the head and neck, and 2D echo are all unremarkable.     TMJ (dislocation of temporomandibular joint)    Troponin level elevated    Trop 60 60 47 with TIA and no CP: Lexiscan negative with EF 51       Past Surgical History:   Procedure Laterality Date    Av fistula revision, open Left     Colonoscopy N/A 03/26/2023    Procedure: COLONOSCOPY;  Surgeon: Heath Vu MD;  Location:  ENDOSCOPY    Colonoscopy N/A 12/30/2023    Procedure: COLONOSCOPY with cold snare polypectomy and forcep polypectomy;  Surgeon: Ousmane Suarez MD;  Location:  ENDOSCOPY    Colonoscopy & polypectomy  2019    [facility]  2019    Duodenitis. Biopsied. EUS for weight loss was negative    Heart surgery      Hernia surgery  08/17/2022    Dr Barnes    Laminectomy,>2 sgmt,lumbar  09/06/2018    L4-L5 Decomp Discectomy [facility] L4-L5    Mitralplasty w cp bypass  1994    [facility]: Repair    Repair rotator cuff,chronic Left     torn and had a ruptured bicep    Valve repair       mitral valve     Social History     Socioeconomic History    Marital status:    Tobacco Use    Smoking status: Former     Current packs/day: 0.00     Average packs/day: 1 pack/day for 27.0 years (27.0 ttl pk-yrs)     Types: Cigarettes     Start date: 1995     Quit date: 2022     Years since quittin.5     Passive exposure: Never    Smokeless tobacco: Never   Vaping Use    Vaping status: Never Used   Substance and Sexual Activity    Alcohol use: No    Drug use: No     Social Determinants of Health     Financial Resource Strain: Medium Risk (2024)    Received from [facility]    Overall Financial Resource Strain (CARDIA)     Difficulty of Paying Living Expenses: Somewhat hard   Food Insecurity: No Food Insecurity (2024)    Food Insecurity     Food Insecurity: Never true   Transportation Needs: No Transportation Needs (2024)    Transportation Needs     Lack of Transportation: No   Physical Activity: Inactive (2024)    Received from [facility]    Exercise Vital Sign     Days of Exercise per Week: 0 days     Minutes of Exercise per Session: 0 min   Stress: Stress Concern Present (2024)    Received from [facility]    [employer] - Occupational Stress Questionnaire     Feeling of Stress : Rather much   Social Connections: Unknown (2024)    Received from [facility]    Social Connection and Isolation Panel [NHANES]     Frequency of Communication with Friends and Family: More than three times a week     Frequency of Social Gatherings with Friends and Family: More than three times a week     Attends [employer] Services: Never     Active Member of Clubs or Organizations: No     Attends Club or Organization Meetings: Never     Marital Status: Patient unable to answer   Housing Stability: Low Risk  (2024)    Housing Stability     Housing Instability: No     Review of Systems    Positive for stated Chief  Complaint: Fatigue and Rash Skin Problem    Other systems are as noted in HPI.  Constitutional and vital signs reviewed.      All other systems reviewed and negative except as noted above.    Physical Exam     ED Triage Vitals [09/29/24 1231]   /87   Pulse 85   Resp (!) 28   Temp 98.6 °F (37 °C)   Temp src Temporal   SpO2 98 %   O2 Device None (Room air)       Current Vitals:   Vital Signs  BP: (!) 167/103  Pulse: 79  Resp: 22  Temp: 98.5 °F (36.9 °C)  Temp src: Oral  MAP (mmHg): (!) 120    Oxygen Therapy  SpO2: 99 %  O2 Device: None (Room air)  Pulse Oximetry Type: Continuous  Oximetry Probe Site Changed: No  Pulse Ox Probe Location: Left hand      Physical Exam    Physical Exam  Vitals signs and nursing note reviewed.   General:  Patient laying supine in the bed in no acute distress  Head: Normocephalic and atraumatic.   HEENT:  Mucous membranes are moist.   Cardiovascular:  Normal rate and regular rhythm.  No Edema  Chest wall: Right subclavian tunneled catheter site with diffuse tenderness, no significant erythema or edema noted, no drainage appreciated on the overlying dressing  Pulmonary:  Pulmonary effort is normal.  Normal breath sounds. No wheezing, rhonchi or rales.   Abdominal: Soft nontender nondistended, normal bowel sounds, no guarding no rebound tenderness  Skin: Warm and dry  Neurological: Awake alert, speech is normal      ED Course     Labs Reviewed   COMP METABOLIC PANEL (14) - Abnormal; Notable for the following components:       Result Value    Glucose 120 (*)     BUN 78 (*)     Creatinine 9.38 (*)     Calcium, Total 7.8 (*)     Calculated Osmolality 313 (*)     eGFR-Cr 6 (*)     AST 14 (*)     ALT 9 (*)     All other components within normal limits   CBC WITH DIFFERENTIAL WITH PLATELET - Abnormal; Notable for the following components:    RBC 2.93 (*)     HGB 9.2 (*)     HCT 27.8 (*)     All other components within normal limits   TROPONIN I HIGH SENSITIVITY - Abnormal; Notable for the  following components:    Troponin I (High Sensitivity) 207 (*)     All other components within normal limits   PRO BETA NATRIURETIC PEPTIDE - Abnormal; Notable for the following components:    Pro-Beta Natriuretic Peptide 5,701 (*)     All other components within normal limits   PROTHROMBIN TIME (PT) - Normal   LIPID PANEL - Normal   BLOOD CULTURE   BLOOD CULTURE   BLOOD CULTURE   BLOOD CULTURE     EKG    Rate, intervals and axes as noted on EKG Report.  Rate: 85  Rhythm: Sinus Rhythm  Reading: Prolonged QT at 490 nonspecific changes no acute ischemic changes    CT CHEST(CONTRAST ONLY) (CPT=71260)    Result Date: 9/29/2024  PROCEDURE:  CT CHEST(CONTRAST ONLY) (CPT=71260)  COMPARISON:  [facility], CT, CTA CHEST + CT ABD (W) + CT PEL (W) SH(CPT=71275/62913), 11/23/2023, 4:31 PM.  INDICATIONS:  ID request - possible tunneled R subclavian catheter infection.  TECHNIQUE:  CT images were obtained with non-ionic intravenous contrast material. Dose reduction techniques were used. Dose information is transmitted to the ACR (American College of Radiology) NRDR (National Radiology Data Registry) which includes the Dose Index Registry.  PATIENT STATED HISTORY:(As transcribed by Technologist)   body aches, chill, night sweats- noticed drainage from dialysis cath this am   CONTRAST USED:  75cc of Isovue 370  FINDINGS:  LUNGS:  Multifocal dependent wedge-shaped areas of consolidation in lower lobes are noted and appears similar to previous study likely indicating chronic postinflammatory scar and atelectasis.  There is emphysematous change noted in upper lobes. VASCULATURE:  No visible pulmonary arterial thrombus or attenuation.  BASIL:  Right hilar lymph node measures 2.8 x 1.7 cm (previously 2.5 x 1.5 cm). MEDIASTINUM:  Low right paratracheal lymph node series 2, image 41 measures 2.2 x 1.4 cm (previously 2.2 x 1.5 cm). CARDIAC:  Metallic densities within the mitral valve are presumably from prior surgical reconstruction or  replacement of the valve. PLEURA:  No mass or effusion.  THORACIC AORTA:  No aneurysm.  CHEST WALL:  Right internal jugular tunneled dialysis catheter is noted.  The tip of this is in SVC.  The entirety of the catheter is not included on the chest CT.  There is no acute abnormality associated with the catheter within the limits of this study.  LIMITED ABDOMEN:  Limited images of the upper abdomen are unremarkable.  BONES:  No bony lesion or fracture.             CONCLUSION:  1. Right hilar and right mediastinal lymph nodes have increased slightly in size and are probably reactive. 2. Wedge-shaped areas of density in the right lung are unchanged and probably a combination of chronic scar and atelectasis. 3. Residual prior mitral valve surgery is noted. 4. There is otherwise no acute CT finding.   LOCATION:  [facility]   Dictated by ([facility]): Adrian Blevins MD on 9/29/2024 at 5:05 PM     Finalized by (CST): Adrian Blevins MD on 9/29/2024 at 5:09 PM       XR CHEST AP PORTABLE  (CPT=71045)    Result Date: 9/29/2024  PROCEDURE:  XR CHEST AP PORTABLE  (CPT=71045)  TECHNIQUE:  AP chest radiograph was obtained.  COMPARISON:  [facility], XR, XR CHEST AP PORTABLE  (CPT=71045), 9/15/2024, 8:01 PM.  INDICATIONS:  fatigue,pain and oozing from port nausea and vomiting . seen yesterday for same symptoms  PATIENT STATED HISTORY: (As transcribed by Technologist)  Pt c/o fatigue and nausea. Hx of heart surgery.    FINDINGS:  Valve prosthesis noted.  Cardiomegaly with normal pulmonary vascularity.  Small right pleural effusion.  Right dialysis catheter tips in the SVC.  No evidence of pneumothorax.            CONCLUSION:  No lobar pneumonia or overt congestive failure.  Small right pleural effusion.   LOCATION:  [id number]      Dictated by (CST): Jj Conley MD on 9/29/2024 at 2:44 PM     Finalized by (CST): Jj Conley MD on 9/29/2024 at 2:44 PM       IR CENTRAL VENOUS ACCESS    Result Date: 9/20/2024            PROCEDURE:  IR CENTRAL  VENOUS CATHETER INSERTION  COMPARISON:  None.  INDICATIONS:  Dialysis, end-stage renal disease  TECHNIQUE:  Prior to the procedure, I discussed with the patient and/or legal representative the potential benefits, risks, and side effects of this procedure, the likelihood of the patient achieving goals; and the potential problems that might occur during recuperation.  I discussed reasonable alternatives to the procedure, including risks, benefits, steps to prevent infection and side effects related to the alternatives, and risks related to not receiving this procedure. A witnessed verbal and signed consent was obtained and documented in the patient's chart.  IV was checked and maintained by the nurse. Moderate conscious sedation was performed under continuous pulse oximetry and cardiac monitoring under my direct supervision.  The radiology nurse was in attendance throughout the exam. Moderate conscious sedation of 4 mg Versed and 200 mcg of Fentanyl was given.  Patient was assessed and monitoring of oxygen saturation, heart rate, and blood pressure by the nursing staff and myself during the exam for a total intraservice time of 30 minutes of conscious sedation time from 8:22 a.m. to 8:52 a.m..  2 grams of Ancef were given pre-procedurally via existing IV.  The patient was placed supine on the angiographic table and the right chest and neck was prepped and covered with a full body drape in the usual sterile fashion.  All operators present for the case performed standard pre-procedural prep including hand washing, sterile gloves, gown, mask, and cap.  All aspects of the maximum sterile barrier technique were followed.  A preprocedural time out was performed with all physicians, technologists, and nurses involved with the procedure. Sonography of the right  neck demonstrated included right internal jugular vein and a patent right external jugular vein and a permanent image was obtained.  Under sonographic guidance, the  right external jugular vein was accessed using a micropuncture system.  A guidewire was advanced into the IVC under fluoroscopic guidance.  Using blunt dissection a dual lumen tunneled catheter was placed via a peel-away sheath.  Both lumens flushed and returned easily.  The catheter was secured and heparinized.  A small amount of Dermabond was placed at the neck venotomy site.   Sterile gauze and transparent dressing was applied.  The patient tolerated the procedure well and there were no immediate complications. Routine post tunneled catheter insertion instructions were communicated to the patient.  ESTIMATED BLOOD LOSS: Less than 5cc.  FLUORO TIME/DOSE: 0.2 minutes  AIR KERMA: 1.13 mGy  IMPRESSION: Successful placement of right external jugular vein tunneled dialysis catheter ( 23 cm tip to cuff Bard catheter).  The right internal jugular vein was occluded more centrally.  PLAN: The patient will follow-up with nephrology and at dialysis.  Catheter is ready for use, routine post-catheter care.  LOCATION:  [facility]    Dictated by (CST): Quinn Bernal MD on 9/20/2024 at 9:43 AM     Finalized by (CST): Quinn Bernal MD on 9/20/2024 at 9:44 AM       US VENOUS DOPPLER LEG LEFT - DIAG IMG (CPT=93971)    Result Date: 9/19/2024  PROCEDURE:  US VENOUS DOPPLER LEG LEFT - DIAG IMG (CPT=93971)  COMPARISON:  [facility], US DUPLEX SCAN HEMODIALYSIS ACCESS  (CPT=93990), 9/18/2024, 2:38 PM.  INDICATIONS:  Left calf pain  TECHNIQUE:  Real time, grey scale, and duplex ultrasound was used to evaluate the lower extremity venous system. B-mode two-dimensional images of the vascular structures, Doppler spectral analysis, and color flow.  Doppler imaging were performed.  The following veins were imaged:  Common, deep, and superficial femoral, popliteal, sapheno-femoral junction, posterior tibial veins, and the contralateral common femoral vein.  PATIENT STATED HISTORY: (As transcribed by Technologist)     FINDINGS:  EXTREMITY  EXAMINED:  Left lower extremity SAPHENOFEMORAL JUNCTION:  No reflux. THROMBI:  None visible. COMPRESSION:  Normal compressibility, phasicity, and augmentation. OTHER:  Negative.            CONCLUSION:  Negative for deep venous thrombosis of the left lower extremity   LOCATION:  [facility]    Dictated by (CST): Claude Singh MD on 9/19/2024 at 10:06 AM     Finalized by (CST): Claude Singh MD on 9/19/2024 at 10:06 AM       US DUPLEX SCAN HEMODIALYSIS ACCESS  (CPT=93990)    Result Date: 9/18/2024  PROCEDURE:  US DUPLEX SCAN HEMODIALYSIS ACCESS  (CPT=93990)  COMPARISON:  None.  INDICATIONS:  LUE AVF access issue  TECHNIQUE:  Real time gray scale and duplex color Doppler sonography was used to evaluate the left upper extremity arterial and venous system. Being noted two-dimensional images of the vascular structures, Doppler spectral analysis, and color Doppler imaging were performed  PATIENT STATED HISTORY: (As transcribed by Technologist)     FINDINGS:   Left UPPER EXTREMITY ARTERIAL: Proximal brachial: diameter 8 mm, velocity 109 centimeters/sec Mid brachial: diameter 7 mm, velocity 77 centimeters/sec Distal brachial: diameter 7 mm, velocity 83 centimeters/sec Proximal radial: diameter 6 mm, velocity 95 centimeters/sec Mid radial: diameter 6 mm, velocity 89 centimeters/sec Distal radial: diameter 6 mm, velocity 89 centimeters/sec Fistula/graft arterial anastomosis: diameter 7 mm, velocity 345 centimeters/sec Proximal graft: diameter 3 mm, velocity 380 centimeters/sec Mid graft: diameter 4 mm, velocity 74 centimeters/sec Distal graft: diameter 67 mm, velocity 4 centimeters/sec  Left UPPER EXTREMITY VENOUS: Proximal arm cephalic vein: diameter 2 mm, velocity 6 centimeters/sec Mid arm cephalic vein:  diameter 2 mm, velocity 9 centimeters/sec Distal arm cephalic vein: diameter 2 mm, velocity 5 centimeters/sec Prox basilic vein: diameter 7 mm, velocity 24 centimeters/sec Mid basilic vein: diameter 6 mm, velocity 44 centimeters/sec  Distal basilic vein: diameter 6 mm, velocity 29 centimeters/sec            CONCLUSION:  Elevated velocity of the left upper extremity fistula at the anastomosis and proximal venous outflow is suggestive of stenosis in this region.   LOCATION:  [id number]    Dictated by (CST): Smith Randle MD on 9/18/2024 at 4:07 PM     Finalized by (CST): Smith Randle MD on 9/18/2024 at 4:12 PM       CT ABDOMEN+PELVIS(CPT=74176)    Result Date: 9/15/2024  PROCEDURE:  CT ABDOMEN+PELVIS (CPT=74176)  COMPARISON:  [facility], CT, CT ABDOMEN+PELVIS(CPT=74176), 8/30/2024, 9:57 AM.  INDICATIONS:  perla, abd pain  TECHNIQUE:  Unenhanced multislice CT scanning was performed from the dome of the diaphragm to the pubic symphysis.  Dose reduction techniques were used. Dose information is transmitted to the ACR (American College of Radiology) NRDR (National Radiology Data Registry) which includes the Dose Index Registry.  PATIENT STATED HISTORY: (As transcribed by Technologist)     FINDINGS:  LIVER:  No enlargement, atrophy, abnormal density, or significant focal lesion.  BILIARY:  No visible dilatation or calcification.  PANCREAS:  No lesion, fluid collection, ductal dilatation, or atrophy.  SPLEEN:  No enlargement or focal lesion.  KIDNEYS:  No mass, obstruction, or calcification.  ADRENALS:  No mass or enlargement.  AORTA/VASCULAR:    Unremarkable as seen on non-contrast imaging. RETROPERITONEUM:  No mass or adenopathy.  BOWEL/MESENTERY:  There is diffuse diverticulosis of the colon.  There is no CT evidence of diverticulitis. ABDOMINAL WALL:  No mass or hernia.  URINARY BLADDER:  No visible focal wall thickening, lesion, or calculus.  PELVIC NODES:  No adenopathy.  PELVIC ORGANS:  No visible mass.  Pelvic organs appropriate for patient age.  BONES:  There is advanced degenerative disc disease in the lumbar spine. LUNG BASES:  Dependent atelectasis right lower lobe is noted.  There is a small right pleural effusion. OTHER:  Negative.              CONCLUSION:  1. A specific etiology for abdominal pain is not evident from this study. 2. There is diverticulosis of colon without CT evidence of diverticulitis. 3. Small right pleural effusion has decreased since prior study.    LOCATION:  [facility]   Dictated by (CST): Adrian Blevins MD on 9/15/2024 at 10:12 PM     Finalized by (CST): Adrian Blevins MD on 9/15/2024 at 10:15 PM       XR CHEST AP PORTABLE  (CPT=71045)    Result Date: 9/15/2024  PROCEDURE:  XR CHEST AP PORTABLE  (CPT=71045)  TECHNIQUE:  AP chest radiograph was obtained.  COMPARISON:  [facility] , XR, XR CHEST PA + LAT CHEST (CCA=29814), 9/02/2024, 7:58 AM.  [facility] , XR, XR CHEST AP PORTABLE  (CPT=71045), 8/30/2024, 11:23 PM.  INDICATIONS:  perla, abd pain  PATIENT STATED HISTORY: (As transcribed by Technologist)  Patient states he has difficulty in breathing and productive cough for 2 days. History of asthma and pneumonia.    FINDINGS:  There is interval improvement in consolidation right lower lobe consistent with improved right lower lobe pneumonia or atelectasis.  The remainder of the lungs is clear.  Heart size is within normal limits.  Mediastinum and elenita are unremarkable.  Right internal jugular tunneled dialysis catheter has been removed.            CONCLUSION:  1. There is interval removal of right tunneled dialysis catheter. 2. There is improvement in opacity right lung base consistent with approved atelectasis or pneumonia.    LOCATION:  [facility]      Dictated by (CST): Adrian Blevins MD on 9/15/2024 at 8:23 PM     Finalized by (CST): Adrian Blevins MD on 9/15/2024 at 8:24 PM       XR CHEST PA + LAT CHEST (CPT=71046)    Result Date: 9/2/2024  PROCEDURE:  XR CHEST PA + LAT CHEST (CPT=71046)  INDICATIONS:  hypoxia  COMPARISON:  [facility], CT, CT ABDOMEN+PELVIS(CPT=74176), 8/30/2024, 9:57 AM.  [facility] , XR, XR CHEST AP PORTABLE  (CPT=71045), 8/30/2024, 11:23 PM.  [facility], XR, XR CHEST AP PORTABLE  (CPT=71045), 8/30/2024, 8:57 AM.  TECHNIQUE:  PA and  lateral chest radiographs were obtained.  PATIENT STATED HISTORY: (As transcribed by Technologist)  Patient offered no additional history at this time.               CONCLUSION:  The patient is status post mitral valve replacement.  The heart size is within normal limits without CHF.  A right central venous catheter/Rito catheter is noted with tip in the distal SVC. There is decrease in the right lower lobe and right perihilar consolidation with slight decrease in the right effusion.  Stable slight thickening of the horizontal fissure.  The left lung is clear.  No pneumothorax.  Bolus changes are noted in the right lung base laterally/right middle lobe.   LOCATION:  [facility]   Dictated by (CST): Albert Faith MD on 9/02/2024 at 8:09 AM     Finalized by (CST): Albert Faith MD on 9/02/2024 at 8:11 AM       XR CHEST AP PORTABLE  (CPT=71045)    Result Date: 8/30/2024  PROCEDURE:  XR CHEST AP PORTABLE  (CPT=71045)  TECHNIQUE:  AP chest radiograph was obtained.  COMPARISON:  [facility], XR, XR CHEST AP PORTABLE  (CPT=71045), 8/30/2024, 8:57 AM.  INDICATIONS:  rectal bleeding and chest pain  PATIENT STATED HISTORY: (As transcribed by Technologist)  rectal bleeding and chest pain.    FINDINGS:  Tunneled right internal jugular hemodialysis catheter remains in place.  Stable cardiomegaly with valve prosthesis.  Mild pulmonary vascular congestion and perihilar interstitial edema.  Increased right pleural effusion and right basilar consolidation/atelectasis.            CONCLUSION:  1. Increased right pleural effusion and right basilar atelectasis/consolidation. 2. Stable vascular congestion interstitial edema suggestive of CHF or fluid overload.  LOCATION:  [facility]      Dictated by (CST): Ricki Zaragoza MD on 8/30/2024 at 11:43 PM     Finalized by (CST): Ricki Zaragoza MD on 8/30/2024 at 11:44 PM          MDM      44-year-old gentleman here for evaluation of generally not feeling well over the past few days, missed  dialysis 2 days ago.  Also complains of pain around his tunneled subclavian catheter site.  Differential include viral syndrome, line infection, electrolyte abnormality.  Patient is afebrile hemodynamically stable, does not appear volume overloaded, electrolytes normal, chest x-ray with no acute abnormality.  Discussed patient's symptoms with nephrology on-call for patient's nephrologist, agrees with plan for coverage with IV vancomycin, and admitted for further monitoring evaluation blood cultures obtained.  Discussed the case with infectious disease Dr. Carballo,, request CT of the chest with IV contrast to evaluate for infection around the catheter.  Patient covered with IV vancomycin will be transferred over to [facility] for further monitoring evaluation, is tolerating p.o. here, case endorsed to [facility] hospitalist who agrees with plan for admission  Admission disposition: 9/29/2024  3:42 PM      Medical Decision Making      Disposition and Plan     Clinical Impression:  1. Hemodialysis catheter infection, initial encounter (HCC)         Disposition:  Admit  9/29/2024  3:42 pm      Hospital Problems       Present on Admission  Date Reviewed: 8/8/2024            ICD-10-CM Noted POA    * (Principal) Hemodialysis catheter infection, initial encounter (HCC) T82.7XXA 9/29/2024 Unknown             Signed by Mckinley Macedo MD on 9/29/2024  7:35 PM         MEDICATIONS ADMINISTERED IN LAST 1 DAY:  ARIPiprazole (Abilify) tab 15 mg       Date Action Dose Route User    9/30/2024 1426 Given 15 mg Oral Jaleesa Poe RN          buPROPion ER (Wellbutrin XL) 24 hr tab 150 mg       Date Action Dose Route User    9/30/2024 1426 Given 150 mg Oral Jaleesa Poe RN          carvedilol (Coreg) tab 25 mg       Date Action Dose Route User    9/30/2024 1821 Given 25 mg Oral Jaleesa Poe RN          ceFAZolin (Ancef) 1 g in dextrose 5% 100mL IVPB-ADD       Date Action Dose Route User    9/30/2024 1822 New Bag 1 g Intravenous  Jaleesa Poe RN          cloNIDine (Catapres) tab 0.1 mg       Date Action Dose Route User    9/30/2024 2025 Given 0.1 mg Oral Alice Vanegas RN lic pend          FLUoxetine (PROzac) cap 40 mg       Date Action Dose Route User    9/30/2024 1426 Given 40 mg Oral Jaleesa Poe RN          heparin (Porcine) 1000 UNIT/ML injection 4,000 Units       Date Action Dose Route User    9/30/2024 1311 Given by Other 4,000 Units Intravenous Jaleesa Poe RN          heparin (Porcine) 5000 UNIT/ML injection 5,000 Units       Date Action Dose Route User    10/1/2024 0543 Given 5,000 Units Subcutaneous (Right Lower Abdomen) Alice Vanegas RN lic pend    9/30/2024 2027 Given 5,000 Units Subcutaneous (Left Lower Abdomen) Alice Vanegas RN lic pend    9/30/2024 1339 Given 5,000 Units Subcutaneous (Left Lower Abdomen) Jaleesa Poe RN          hydrALAZINE (Apresoline) tab 50 mg       Date Action Dose Route User    9/30/2024 2026 Given 50 mg Oral Alice Vanegas RN lic pend    9/30/2024 1426 Given 50 mg Oral Jaleesa Poe RN          HYDROcodone-acetaminophen (Norco)  MG per tab 1 tablet       Date Action Dose Route User    9/30/2024 1339 Given 1 tablet Oral Jaleesa Poe RN          lamoTRIgine (LaMICtal) tab 150 mg       Date Action Dose Route User    9/30/2024 1430 Given 150 mg Oral Jaleesa Poe RN          NIFEdipine ER (Procardia-XL) 24 hr tab 60 mg       Date Action Dose Route User    9/30/2024 2025 Given 60 mg Oral Alice Vanegas RN lic pend          sevelamer carbonate (Renvela) tab 800 mg       Date Action Dose Route User    9/30/2024 1821 Given 800 mg Oral Jaleesa Poe RN    9/30/2024 1426 Given 800 mg Oral Jaleesa Poe RN          tamsulosin (Flomax) cap 0.4 mg       Date Action Dose Route User    9/30/2024 1426 Given 0.4 mg Oral Jaleesa Poe RN          umeclidinium bromide (Incruse Ellipta) 62.5 MCG/ACT inhaler 1 puff       Date Action Dose Route User    9/30/2024 0976 Given 1 puff Inhalation Stout,  Deanna MOY RCP          vancomycin (Vancocin) 2.25 g in sodium chloride 0.9% 500 mL IVPB premix       Date Action Dose Route User    9/30/2024 1344 New Bag 2,250 mg Intravenous Jaleesa Poe, RN            Vitals (last day)       Date/Time Temp Pulse Resp BP SpO2 Weight O2 Device O2 Flow Rate (L/min) [facility]    10/01/24 0500 98.2 °F (36.8 °C) 92 20 147/93 94 % -- None (Room air) -- AM    09/30/24 2345 98.1 °F (36.7 °C) 86 20 151/89 97 % -- None (Room air) -- AM    09/30/24 2000 98.1 °F (36.7 °C) 88 20 148/102 98 % -- None (Room air) -- AM    09/30/24 1600 97.5 °F (36.4 °C) 96 20 147/93 97 % -- None (Room air) 0 L/min     09/30/24 1316 -- 85 -- 159/98 99 % -- -- -- JM    09/30/24 1200 98.2 °F (36.8 °C) 82 20 163/106 98 % -- None (Room air) 0 L/min     09/30/24 0800 97.8 °F (36.6 °C) 79 19 120/76 96 % -- None (Room air) 0 L/min     09/30/24 0530 98.5 °F (36.9 °C) 77 18 133/82 98 % -- None (Room air) -- AM    09/30/24 0051 -- 79 -- 156/100 99 % -- -- -- XO    09/30/24 0000 -- 82 -- 157/112 95 % -- -- -- XO         9/30/2024 H&P  Date of Service: 9/30/2024  9:28 AM     Signed       Expand All Collapse All    .CC:       Chief Complaint   Patient presents with    Fatigue    Rash Skin Problem         PCP: Adrian Small MD     History of Present Illness: Patient is a 44 year old male with PMH sig for esrd on HD, htn who presented with feeling poorly and noting problem from dialysis catheter site.      He had noticed drainage from tunneled subclavian catheter site, increased pain and swelling for past several days. Foul appearing dc at home. Missed HD on 9/27 b/c not feeling well. +fevers/chills at home.      PMH  Past Medical History       Past Medical History:    Asthma (HCC)    Attention deficit hyperactivity disorder (ADHD)    Back problem    Bipolar 1 disorder (HCC)    CKD (chronic kidney disease) stage 3, GFR 30-59 ml/min ([facility])     Dr Meeks    Congestive heart disease (HCC)    COPD (chronic obstructive pulmonary  disease) (HCC)    Coronary atherosclerosis    Deep vein thrombosis (HCC)     at age 17 R/T cast    Depression    Diabetes (HCC)    Essential hypertension     3/21 echo: severe concentric LVH with normal EF and no MR or pHTN    Extrinsic asthma, unspecified    Heart attack (HCC)     2016- angiogram- no intervention    Heart valve disease     mitral valve repair in 1994/    High blood pressure    High cholesterol    History of blood transfusion    History of mitral valve repair    Hyperlipidemia    Low back pain     tight and stiff after sweeping and mopping    LVH (left ventricular hypertrophy)    Migraines    Mixed hyperlipidemia      HDL 38 LDL 97 VLDL 57     Monoclonal gammopathy     IgG kappa     MVP (mitral valve prolapse)     Repair 1994 at [place]; echoes as recently as 3/21 show mild or trivial MR and no stenosis    Neuropathy    Osteoarthritis     hip ,knees    Pneumonia due to organism    Pulmonary embolism (HCC)    Renal disorder    Stroke (HCC)    TIA (transient ischemic attack)     Initial history of left-sided weakness and slurred speech. (+) cocaine. MRI of the brain, CT angiogram of the head and neck, and 2D echo are all unremarkable.     TMJ (dislocation of temporomandibular joint)    Troponin level elevated     Trop 60 60 47 with TIA and no CP: Lexiscan negative with EF 51            PSH  Past Surgical History         Past Surgical History:   Procedure Laterality Date    Av fistula revision, open Left      Colonoscopy N/A 03/26/2023     Procedure: COLONOSCOPY;  Surgeon: Heath Vu MD;  Location:  ENDOSCOPY    Colonoscopy N/A 12/30/2023     Procedure: COLONOSCOPY with cold snare polypectomy and forcep polypectomy;  Surgeon: Ousmane Suarez MD;  Location:  ENDOSCOPY    Colonoscopy & polypectomy   2019    Egd   2019     Duodenitis. Biopsied. EUS for weight loss was negative    Heart surgery        Hernia surgery   08/17/2022     Dr Barnes    Laminectomy,>2 sgmt,lumbar   09/06/2018      L4-L5 Decomp Discectomy ROEM L4-L5    Mitralplasty w cp bypass   1994     [facility]: Repair    Repair rotator cuff,chronic Left       torn and had a ruptured bicep    Valve repair   1994     mitral valve            ALL:  Allergies        Allergies   Allergen Reactions    Hydrochlorothiazide RASH and HIVES            Home Medications:  Medications Taking          Outpatient Medications Marked as Taking for the 9/29/24 encounter (Hospital Encounter)   Medication Sig Dispense Refill    ondansetron 4 MG Oral Tablet Dispersible Take 1 tablet (4 mg total) by mouth every 4 (four) hours as needed for Nausea. 10 tablet 0    HYDROcodone-acetaminophen (NORCO)  MG Oral Tab Take 1 tablet by mouth every 6 (six) hours as needed for Pain. 20 tablet 0    cloNIDine 0.1 MG Oral Tab Take 1 tablet (0.1 mg total) by mouth 2 (two) times daily. 60 tablet 0    levoFLOXacin 250 MG Oral Tab TAKE 2 TABLETs BY MOUTH one time then take 1 tablet every 48 hours for 30 days        albuterol 108 (90 Base) MCG/ACT Inhalation Aero Soln Inhale 2 puffs into the lungs every 6 (six) hours as needed for Wheezing.        tiotropium 18 MCG Inhalation Cap Inhale 1 capsule (18 mcg total) into the lungs daily.        tamsulosin 0.4 MG Oral Cap Take 1 capsule (0.4 mg total) by mouth daily.        ARIPiprazole 15 MG Oral Tab Take 1 tablet (15 mg total) by mouth daily.        isosorbide mononitrate ER 30 MG Oral Tablet 24 Hr Take 1 tablet (30 mg total) by mouth daily. Hold if systolic blood pressure <130        Lisdexamfetamine Dimesylate 60 MG Oral Cap Take 70 mg by mouth every morning.        buPROPion  MG Oral Tablet 24 Hr Take 1 tablet (150 mg total) by mouth daily.        lamoTRIgine (LAMICTAL) 150 MG Oral Tab Take 1 tablet (150 mg total) by mouth daily. 7 tablet 0    FLUoxetine HCl 40 MG Oral Cap Take 1 capsule (40 mg total) by mouth daily. 7 capsule 0    RENVELA 800 MG Oral Tab Take 1 tablet (800 mg total) by mouth 3 (three) times daily with  meals.        losartan 100 MG Oral Tab Take 1 tablet (100 mg total) by mouth daily. Hold if systolic blood pressure <130        hydrALAZINE 50 MG Oral Tab Take 1 tablet (50 mg total) by mouth in the morning and 1 tablet (50 mg total) before bedtime. Hold if systolic blood pressure <130. 30 tablet 0    NIFEdipine ER 60 MG Oral Tablet 24 Hr Take 1 tablet (60 mg total) by mouth in the morning and 1 tablet (60 mg total) before bedtime. Hold if systolic blood pressure <130.        carvedilol 25 MG Oral Tab Take 1 tablet (25 mg total) by mouth in the morning and 1 tablet (25 mg total) in the evening. Take with meals. 60 tablet 6    Fenofibrate 134 MG Oral Cap Take 1 capsule by mouth nightly. 90 capsule 1    Fluticasone Propionate 50 MCG/ACT Nasal Suspension SPRAY ONCE INTO EACH NOSTRIL BID PRN 15.8 mL 0               Soc Hx  Social History            Tobacco Use    Smoking status: Former       Current packs/day: 0.00       Average packs/day: 1 pack/day for 27.0 years (27.0 ttl pk-yrs)       Types: Cigarettes       Start date: 1995       Quit date: 2022       Years since quittin.5       Passive exposure: Never    Smokeless tobacco: Never   Substance Use Topics    Alcohol use: No         Fam Hx  Family History         Family History   Problem Relation Age of Onset    Hypertension Father      Alcohol and Other Disorders Associated Father      Substance Abuse Father           cocaine    Dementia Father      Cancer Father           lung    Diabetes Mother      Cancer Mother           multiple myeloma    Hypertension Mother      Anxiety Maternal Aunt      Depression Maternal Aunt      Anxiety Maternal Aunt      Depression Maternal Aunt      Bipolar Disorder Maternal Aunt      Diabetes Maternal Grandmother      Hypertension Maternal Grandmother      Cancer Maternal Grandfather           stomach cancer    Diabetes Maternal Grandfather      Hypertension Maternal Grandfather      Alcohol and Other Disorders Associated  Maternal Grandfather      Hypertension Paternal Grandmother      Hypertension Paternal Grandfather      Cancer Sister           uterine and ovarian    Hypertension Sister      Cancer Maternal Uncle           lung    Cancer Paternal Aunt           throat            Review of Systems  Comprehensive ROS reviewed and negative except for what's stated above.         OBJECTIVE:  /76 (BP Location: Right arm)   Pulse 79   Temp 97.8 °F (36.6 °C) (Oral)   Resp 19   Ht 6' 2\" (1.88 m)   Wt 245 lb (111.1 kg)   SpO2 96%   BMI 31.46 kg/m²      Gen- NAD, appears stated age  HEENT- NCAT, anicteric sclera, MMM, OP clear  Lymph- no cervical LAD  CV- RRR no murmurs. No PAMELA  Lungs- CTAB, good respiratory effort  Abd- soft, ntnd, no organomegaly, BS+  Derm- ttp along subclavian site but no specific induration/swelling/drainage seen currently.  MSK- good muscle strength and tone, no joint swelling  Neuro- A&OX3, no focal deficits        Diagnostic Data:    CBC/Chem        Recent Labs   Lab 09/28/24 0637 09/29/24 1339 09/30/24  0813   WBC 7.2 6.9 5.6   HGB 10.8* 9.2* 8.7*   MCV 92.6 94.9 90.5   .0 235.0 188.0   INR  --  1.06  --                Recent Labs   Lab 09/28/24 0637 09/29/24 1339 09/30/24  0813   * 139 141   K 4.9 4.0 4.2    106 108   CO2 20.0* 22.0 20.0*   BUN 81* 78* 74*   CREATSERUM 9.94* 9.38* 8.28*   * 120* 155*   CA 8.5 7.8* 8.4*   PHOS  --   --  6.9*               Recent Labs   Lab 09/28/24 0637 09/29/24 1339 09/30/24  0813   ALT 12* 9*  --    AST 64* 14*  --    ALB 3.9 3.7 4.0         No results for input(s): \"TROP\" in the last 168 hours.     Additional Diagnostics: personally reviewed EKG- nsr, no st/t abn     CXR: image personally reviewed- clear     Radiology: CT CHEST(CONTRAST ONLY) (CPT=71260)     Result Date: 9/29/2024  PROCEDURE:  CT CHEST(CONTRAST ONLY) (CPT=71260)  COMPARISON:  [facility], CT, CTA CHEST + CT ABD (W) + CT PEL (W) (CPT=71275/97466), 11/23/2023, 4:31 PM.   INDICATIONS:  ID request - possible tunneled R subclavian catheter infection.  TECHNIQUE:  CT images were obtained with non-ionic intravenous contrast material. Dose reduction techniques were used. Dose information is transmitted to the ACR (American College of Radiology) NRDR (National Radiology Data Registry) which includes the Dose Index Registry.  PATIENT STATED HISTORY:(As transcribed by Technologist)   body aches, chill, night sweats- noticed drainage from dialysis cath this am   CONTRAST USED:  75cc of Isovue 370  FINDINGS:  LUNGS:  Multifocal dependent wedge-shaped areas of consolidation in lower lobes are noted and appears similar to previous study likely indicating chronic postinflammatory scar and atelectasis.  There is emphysematous change noted in upper lobes. VASCULATURE:  No visible pulmonary arterial thrombus or attenuation.  BASIL:  Right hilar lymph node measures 2.8 x 1.7 cm (previously 2.5 x 1.5 cm). MEDIASTINUM:  Low right paratracheal lymph node series 2, image 41 measures 2.2 x 1.4 cm (previously 2.2 x 1.5 cm). CARDIAC:  Metallic densities within the mitral valve are presumably from prior surgical reconstruction or replacement of the valve. PLEURA:  No mass or effusion.  THORACIC AORTA:  No aneurysm.  CHEST WALL:  Right internal jugular tunneled dialysis catheter is noted.  The tip of this is in SVC.  The entirety of the catheter is not included on the chest CT.  There is no acute abnormality associated with the catheter within the limits of this study.  LIMITED ABDOMEN:  Limited images of the upper abdomen are unremarkable.  BONES:  No bony lesion or fracture.              CONCLUSION:  1. Right hilar and right mediastinal lymph nodes have increased slightly in size and are probably reactive. 2. Wedge-shaped areas of density in the right lung are unchanged and probably a combination of chronic scar and atelectasis. 3. Residual prior mitral valve surgery is noted. 4. There is otherwise no acute  CT finding.   LOCATION:  [facility]   Dictated by (CST): Adrian Blevins MD on 9/29/2024 at 5:05 PM     Finalized by (CST): Adrian Blevins MD on 9/29/2024 at 5:09 PM        XR CHEST AP PORTABLE  (CPT=71045)     Result Date: 9/29/2024  PROCEDURE:  XR CHEST AP PORTABLE  (CPT=71045)  TECHNIQUE:  AP chest radiograph was obtained.  COMPARISON:  [facility], XR, XR CHEST AP PORTABLE  (CPT=71045), 9/15/2024, 8:01 PM.  INDICATIONS:  fatigue,pain and oozing from port nausea and vomiting . seen yesterday for same symptoms  PATIENT STATED HISTORY: (As transcribed by Technologist)  Pt c/o fatigue and nausea. Hx of heart surgery.    FINDINGS:  Valve prosthesis noted.  Cardiomegaly with normal pulmonary vascularity.  Small right pleural effusion.  Right dialysis catheter tips in the SVC.  No evidence of pneumothorax.             CONCLUSION:  No lobar pneumonia or overt congestive failure.  Small right pleural effusion.   LOCATION:  [id number]      Dictated by (CST): Jj Conley MD on 9/29/2024 at 2:44 PM     Finalized by (CST): Jj Conley MD on 9/29/2024 at 2:44 PM           ASSESSMENT / PLAN:      44-year-old man with history of ESRD on HD who presented with feeling poorly and concern for dialysis catheter infection.     Concern dialysis catheter infection  - Cultures taken from site cultures also pending  - Seen by infectious disease, started on empiric antibiotics  -- CT chest without evidence of deeper infection     Hypertension  Chronic diastolic HF  CAD  - Continue Coreg hydralazine nifedipine clonidine imdur     Anxiety/depression  --cont abilify, wellbutrin, prozac     Subcutaneous heparin, sCDs     **Certification        PHYSICIAN Certification of Need for Inpatient Hospitalization - Initial Certification     Patient will require inpatient services that will reasonably be expected to span two midnight's based on the clinical documentation in H+P.   Based on patients current state of illness, I anticipate that, after  discharge, patient will require TBD.        Laila Ryder MD  [facility] Hospitalist                Selam Jesus MD   Physician  Infectious Disease     Consults     Signed     Date of Service: 9/30/2024 10:08 AM  Consult Orders   ED Consult to Infectious Disease [[id number]] ordered by Mckinley Macedo MD at 09/29/24 1542          Signed       Expand All Collapse All      Infectious Disease Initial Consultation        Date of admission: 9/29/2024  1:23 PM      Date of service: 09/30/24 10:08 AM     Consult requested by: Laila Ryder MD      Reason for consult: Dialysis line infection     Chief complaint: Dialysis line infection     History of present illness: Godwin Fonseca is a 44 year old male with history of end-stage renal disease on hemodialysis through hemodialysis  , Presenting to the emergency room with worsening pain and swelling in that area.  He also reported some foul-smelling discharge surrounding his line.  No other constitutional symptoms.  He missed dialysis on Friday because he was not feeling well.  No fevers or chills.     In the emergency room, the patient was afebrile, hemodynamically stable.  Labs revealed BMP consistent with his end stage renal disease.  LFTs were unremarkable.  CBC showed anemia but otherwise was unremarkable.  2 sets of blood cultures obtained, remain negative to date.  Case discussed with infectious disease, the patient was given a dose of IV vancomycin.  He was also given cefazolin.  A CT of the chest was done that did not show any evidence of a tunneled infection or abscesses.     Review of systems:  All other components of the review of systems are negative, except those described in the history of present illness.      Past Medical History       Past Medical History:    Asthma ([facility])    Attention deficit hyperactivity disorder (ADHD)    Back problem    Bipolar 1 disorder ([facility])    CKD (chronic kidney disease) stage 3, GFR 30-59 ml/min ([facility])     Dr Meeks    Congestive heart  disease (HCC)    COPD (chronic obstructive pulmonary disease) (HCC)    Coronary atherosclerosis    Deep vein thrombosis (HCC)     at age 17 R/T cast    Depression    Diabetes (HCC)    Essential hypertension     3/21 echo: severe concentric LVH with normal EF and no MR or pHTN    Extrinsic asthma, unspecified    Heart attack (HCC)     2016- angiogram- no intervention    Heart valve disease     mitral valve repair in 1994/    High blood pressure    High cholesterol    History of blood transfusion    History of mitral valve repair    Hyperlipidemia    Low back pain     tight and stiff after sweeping and mopping    LVH (left ventricular hypertrophy)    Migraines    Mixed hyperlipidemia      HDL 38 LDL 97 VLDL 57     Monoclonal gammopathy     IgG kappa     MVP (mitral valve prolapse)     Repair 1994 at [place]; echoes as recently as 3/21 show mild or trivial MR and no stenosis    Neuropathy    Osteoarthritis     hip ,knees    Pneumonia due to organism    Pulmonary embolism (HCC)    Renal disorder    Stroke (HCC)    TIA (transient ischemic attack)     Initial history of left-sided weakness and slurred speech. (+) cocaine. MRI of the brain, CT angiogram of the head and neck, and 2D echo are all unremarkable.     TMJ (dislocation of temporomandibular joint)    Troponin level elevated     Trop 60 60 47 with TIA and no CP: Lexiscan negative with EF 51         Past Surgical History         Past Surgical History:   Procedure Laterality Date    Av fistula revision, open Left      Colonoscopy N/A 03/26/2023     Procedure: COLONOSCOPY;  Surgeon: Heath Vu MD;  Location:  ENDOSCOPY    Colonoscopy N/A 12/30/2023     Procedure: COLONOSCOPY with cold snare polypectomy and forcep polypectomy;  Surgeon: Ousmane Suarez MD;  Location:  ENDOSCOPY    Colonoscopy & polypectomy   2019    Egd   2019     Duodenitis. Biopsied. EUS for weight loss was negative    Heart surgery        Hernia surgery   08/17/2022     Dr Barnes     Laminectomy,>2 sgmt,lumbar   2018     L4-L5 Decomp Discectomy ROEM L4-L5    Mitralplasty w cp bypass        [facility]: Repair    Repair rotator cuff,chronic Left       torn and had a ruptured bicep    Valve repair        mitral valve         Short Social Hx on File   Social History            Socioeconomic History    Marital status:    Tobacco Use    Smoking status: Former       Current packs/day: 0.00       Average packs/day: 1 pack/day for 27.0 years (27.0 ttl pk-yrs)       Types: Cigarettes       Start date: 1995       Quit date: 2022       Years since quittin.5       Passive exposure: Never    Smokeless tobacco: Never   Vaping Use    Vaping status: Never Used   Substance and Sexual Activity    Alcohol use: No    Drug use: No      Social Determinants of Health           Financial Resource Strain: Medium Risk (2024)     Received from [facility]     Overall Financial Resource Strain (CARDIA)      Difficulty of Paying Living Expenses: Somewhat hard   Food Insecurity: No Food Insecurity (2024)     Food Insecurity      Food Insecurity: Never true   Transportation Needs: No Transportation Needs (2024)     Transportation Needs      Lack of Transportation: No   Physical Activity: Inactive (2024)     Received from [facility]     Exercise Vital Sign      Days of Exercise per Week: 0 days      Minutes of Exercise per Session: 0 min   Stress: Stress Concern Present (2024)     Received from [facility]     [employer] [place] [employer] - Occupational Stress Questionnaire      Feeling of Stress : Rather much   Social Connections: Unknown (2024)     Received from [facility]     Social Connection and Isolation Panel [NHANES]      Frequency of Communication with Friends and Family: More than three times a week      Frequency of Social Gatherings with Friends and Family: More than three times a week      Attends Yazidism  Services: Never      Active Member of Clubs or Organizations: No      Attends Club or Organization Meetings: Never      Marital Status: Patient unable to answer   Housing Stability: Low Risk  (9/29/2024)     Housing Stability      Housing Instability: No         Family History         Family History   Problem Relation Age of Onset    Hypertension Father      Alcohol and Other Disorders Associated Father      Substance Abuse Father           cocaine    Dementia Father      Cancer Father           lung    Diabetes Mother      Cancer Mother           multiple myeloma    Hypertension Mother      Anxiety Maternal Aunt      Depression Maternal Aunt      Anxiety Maternal Aunt      Depression Maternal Aunt      Bipolar Disorder Maternal Aunt      Diabetes Maternal Grandmother      Hypertension Maternal Grandmother      Cancer Maternal Grandfather           stomach cancer    Diabetes Maternal Grandfather      Hypertension Maternal Grandfather      Alcohol and Other Disorders Associated Maternal Grandfather      Hypertension Paternal Grandmother      Hypertension Paternal Grandfather      Cancer Sister           uterine and ovarian    Hypertension Sister      Cancer Maternal Uncle           lung    Cancer Paternal Aunt           throat         Reviewed, see above     Medications:    Current Hospital Medications     sodium chloride **AND** albumin human    albuterol    ARIPiprazole    buPROPion ER    carvedilol    cloNIDine    dicyclomine    FLUoxetine HCl    hydrALAZINE    isosorbide mononitrate ER    lamoTRIgine    losartan    NIFEdipine ER    ondansetron    sevelamer carbonate    tamsulosin    umeclidinium bromide    heparin    acetaminophen    polyethylene glycol (PEG 3350)    sennosides    bisacodyl    ondansetron    prochlorperazine    ceFAZolin    HYDROcodone-acetaminophen         Allergies:  Allergies        Allergies   Allergen Reactions    Hydrochlorothiazide RASH and HIVES            Physical Exam:      Vitals:      09/30/24 0800   BP: 120/76   Pulse: 79   Resp: 19   Temp: 97.8 °F (36.6 °C)      Vitals signs and nursing note reviewed.   Constitutional:       Appearance: Normal appearance.   HENT:      Head: Normocephalic and atraumatic.      Mouth: Mucous membranes are moist.   Neck:      Musculoskeletal: Neck supple.   Cardiovascular:      Rate and Rhythm: Normal rate.      Heart sounds: Normal heart sounds. No murmur. No friction rub. No gallop.    Pulmonary:      Effort: Pulmonary effort is normal. No respiratory distress.      Breath sounds: Normal breath sounds. No stridor. No wheezing, rhonchi or rales.   Chest:      Chest wall: Mild tenderness around his dialysis line; however, redness and drainage have resolved at this point.  Abdominal:      General: Abdomen is flat. There is no distension.      Palpations: Abdomen is soft. There is no mass.      Tenderness: There is no tenderness. There is no guarding or rebound.      Hernia: No hernia is present.   Musculoskeletal:      Right lower leg: No edema.      Left lower leg: No edema.   Skin:     General: Skin is warm and dry.   Neurological:      General: No focal deficit present.      Mental Status: Alert and oriented to person, place, and time.      Laboratory data:  I have independently reviewed all lab results; including old microbiological results.        Lab Results   Component Value Date     WBC 5.6 09/30/2024     HGB 8.7 09/30/2024     HCT 24.9 09/30/2024     .0 09/30/2024     CREATSERUM 8.28 09/30/2024     BUN 74 09/30/2024      09/30/2024     K 4.2 09/30/2024      09/30/2024     CO2 20.0 09/30/2024      09/30/2024     CA 8.4 09/30/2024     ALB 4.0 09/30/2024     ALKPHO 71 09/29/2024     BILT 0.3 09/29/2024     TP 7.2 09/29/2024     AST 14 09/29/2024     ALT 9 09/29/2024     INR 1.06 09/29/2024     PHOS 6.9 09/30/2024     TROPHS 207 09/29/2024             Recent Labs   Lab 09/30/24  0813   RBC 2.75*   HGB 8.7*   HCT 24.9*   MCV 90.5   MCH  31.6   MCHC 34.9   RDW 13.2   NEPRELIM 3.67   WBC 5.6   .0         Microbiology data:  No results found for this visit on 24.        Radiology:  I have reviewed all imaging data available independently.   CT chest with IV contrast:  No evidence of tunneled line infection or deeper collections     Impression:  Godwin Fonseca is a 44 year old male with      Hemodialysis line site infection, with worsening redness and swelling and drainage  Drainage stopped since starting antibiotics.  Status post vancomycin and cefazolin.  CT scan of the chest without evidence of tunneled infection or deeper collections  End-stage renal disease  On hemodialysis  Antibiotics will be renally adjusted     Recommendations:      Continue IV vancomycin, pharmacy to dose  Continue cefazolin 1 g daily  Continue to follow-up on blood cultures  Continue to monitor daily labs for antibiotic toxicity  Further recommendations will depend on the above work-up and clinical progress      The plan of care was discussed with the primary hospital team, Laila Ryder MD Delaney, Kristie, MD  Physician  Nephrology     Consults     Signed     Date of Service: 2024  3:53 PM  Consult Orders   ED Consult to Nephrology [[id number]] ordered by Mckinley Macedo MD at 24 1459          Signed       [facility]   part of [facility]     Report of Consultation           Godwin Fonseca Patient Status:  Inpatient    1980 MRN [id number]   [facility] 3NE-A Attending Laila Ryder MD   Hosp Day # 1 PCP Adrian Small MD      Date of consult: 2024     REASON FOR CONSULT:      ESRD     HISTORY OF PRESENT ILLNESS:      Godwin Fonseca is a a(n) 44 year old male w ho ESRD on HD MWF, hyperaldosteronism, DM, bipolar disorder who presented with pain and discharge at dialysis catheter site. Notes had HD wed, but missed Friday. Used to follow w Dr Long. At [facility]      Had L AVF but not  mature yet per notes needs rest per vascular, fu as outpt. R TDC placed 9/20/24 lasty admit     REVIEW OF SYSTEMS:      Please see HPI for pertinent positives. 10 point review of systems otherwise reviewed and negative.      HISTORY:      Past Medical History       Past Medical History:    Asthma (HCC)    Attention deficit hyperactivity disorder (ADHD)    Back problem    Bipolar 1 disorder (HCC)    CKD (chronic kidney disease) stage 3, GFR 30-59 ml/min ([facility])     Dr Meeks    Congestive heart disease ([facility])    COPD (chronic obstructive pulmonary disease) ([facility])    Coronary atherosclerosis    Deep vein thrombosis ([facility])     at age 17 R/T cast    Depression    Diabetes ([facility])    Essential hypertension     3/21 echo: severe concentric LVH with normal EF and no MR or pHTN    Extrinsic asthma, unspecified    Heart attack ([facility])     2016- angiogram- no intervention    Heart valve disease     mitral valve repair in 1994/    High blood pressure    High cholesterol    History of blood transfusion    History of mitral valve repair    Hyperlipidemia    Low back pain     tight and stiff after sweeping and mopping    LVH (left ventricular hypertrophy)    Migraines    Mixed hyperlipidemia      HDL 38 LDL 97 VLDL 57     Monoclonal gammopathy     IgG kappa     MVP (mitral valve prolapse)     Repair 1994 at [place]; echoes as recently as 3/21 show mild or trivial MR and no stenosis    Neuropathy    Osteoarthritis     hip ,knees    Pneumonia due to organism    Pulmonary embolism ([facility])    Renal disorder    Stroke ([facility])    TIA (transient ischemic attack)     Initial history of left-sided weakness and slurred speech. (+) cocaine. MRI of the brain, CT angiogram of the head and neck, and 2D echo are all unremarkable.     TMJ (dislocation of temporomandibular joint)    Troponin level elevated     Trop 60 60 47 with TIA and no CP: Lexiscan negative with EF 51         Past Surgical History         Past Surgical History:   Procedure  Laterality Date    Av fistula revision, open Left      Colonoscopy N/A 03/26/2023     Procedure: COLONOSCOPY;  Surgeon: Heath Vu MD;  Location:  ENDOSCOPY    Colonoscopy N/A 12/30/2023     Procedure: COLONOSCOPY with cold snare polypectomy and forcep polypectomy;  Surgeon: Ousmane Suarez MD;  Location:  ENDOSCOPY    Colonoscopy & polypectomy   2019    Egd   2019     Duodenitis. Biopsied. EUS for weight loss was negative    Heart surgery        Hernia surgery   08/17/2022     Dr Barnes    Laminectomy,>2 sgmt,lumbar   09/06/2018     L4-L5 Decomp Discectomy ROEM L4-L5    Mitralplasty w cp bypass   1994     Christiano: Repair    Repair rotator cuff,chronic Left       torn and had a ruptured bicep    Valve repair   1994     mitral valve         Family History         Family History   Problem Relation Age of Onset    Hypertension Father      Alcohol and Other Disorders Associated Father      Substance Abuse Father           cocaine    Dementia Father      Cancer Father           lung    Diabetes Mother      Cancer Mother           multiple myeloma    Hypertension Mother      Anxiety Maternal Aunt      Depression Maternal Aunt      Anxiety Maternal Aunt      Depression Maternal Aunt      Bipolar Disorder Maternal Aunt      Diabetes Maternal Grandmother      Hypertension Maternal Grandmother      Cancer Maternal Grandfather           stomach cancer    Diabetes Maternal Grandfather      Hypertension Maternal Grandfather      Alcohol and Other Disorders Associated Maternal Grandfather      Hypertension Paternal Grandmother      Hypertension Paternal Grandfather      Cancer Sister           uterine and ovarian    Hypertension Sister      Cancer Maternal Uncle           lung    Cancer Paternal Aunt           throat          reports that he quit smoking about 2 years ago. His smoking use included cigarettes. He started smoking about 29 years ago. He has a 27 pack-year smoking history. He has never been exposed to tobacco  smoke. He has never used smokeless tobacco. He reports that he does not drink alcohol and does not use drugs.     ALLERGIES:      Allergies        Allergies   Allergen Reactions    Hydrochlorothiazide RASH and HIVES            MEDICATIONS:        Current Hospital Medications      Current Facility-Administered Medications:     vancomycin (Vancocin) 2.25 g in sodium chloride 0.9% 500 mL IVPB premix, 25 mg/kg, Intravenous, Once    GIVE POST-DIALYSIS vancomycin (Vancocin) 1,000 mg in sodium chloride 0.9% 250 mL IVPB-ADDV, 10 mg/kg, Intravenous, See Admin Instructions (RX holding)    heparin (Porcine) 1000 UNIT/ML injection 1,500 Units, 1.5 mL, Intracatheter, Once    sodium chloride 0.9 % IV bolus 100 mL, 100 mL, Intravenous, Q30 Min PRN **AND** albumin human (Albumin) 25% injection 25 g, 25 g, Intravenous, PRN Dialysis    albuterol (Ventolin HFA) 108 (90 Base) MCG/ACT inhaler 2 puff, 2 puff, Inhalation, Q6H PRN    ARIPiprazole (Abilify) tab 15 mg, 15 mg, Oral, Daily    buPROPion ER (Wellbutrin XL) 24 hr tab 150 mg, 150 mg, Oral, Daily    carvedilol (Coreg) tab 25 mg, 25 mg, Oral, BID with meals    cloNIDine (Catapres) tab 0.1 mg, 0.1 mg, Oral, BID    dicyclomine (Bentyl) cap 10 mg, 10 mg, Oral, TID PRN    FLUoxetine (PROzac) cap 40 mg, 40 mg, Oral, Daily    hydrALAZINE (Apresoline) tab 50 mg, 50 mg, Oral, BID    isosorbide mononitrate ER (Imdur) 24 hr tab 30 mg, 30 mg, Oral, Daily    lamoTRIgine (LaMICtal) tab 150 mg, 150 mg, Oral, Daily    losartan (Cozaar) tab 100 mg, 100 mg, Oral, Daily    NIFEdipine ER (Procardia-XL) 24 hr tab 60 mg, 60 mg, Oral, BID    ondansetron (Zofran-ODT) disintegrating tab 4 mg, 4 mg, Oral, Q4H PRN    sevelamer carbonate (Renvela) tab 800 mg, 800 mg, Oral, TID CC    tamsulosin (Flomax) cap 0.4 mg, 0.4 mg, Oral, Daily    umeclidinium bromide (Incruse Ellipta) 62.5 MCG/ACT inhaler 1 puff, 1 puff, Inhalation, Daily    heparin (Porcine) 5000 UNIT/ML injection 5,000 Units, 5,000 Units,  Subcutaneous, Q8H MARTHA    acetaminophen (Tylenol Extra Strength) tab 500 mg, 500 mg, Oral, Q4H PRN    polyethylene glycol (PEG 3350) (Miralax) 17 g oral packet 17 g, 17 g, Oral, Daily PRN    sennosides (Senokot) tab 17.2 mg, 17.2 mg, Oral, Nightly PRN    bisacodyl (Dulcolax) 10 MG rectal suppository 10 mg, 10 mg, Rectal, Daily PRN    ondansetron (Zofran) 4 MG/2ML injection 4 mg, 4 mg, Intravenous, Q6H PRN    prochlorperazine (Compazine) 10 MG/2ML injection 5 mg, 5 mg, Intravenous, Q8H PRN    ceFAZolin (Ancef) 1 g in dextrose 5% 100mL IVPB-ADD, 1 g, Intravenous, Q24H    HYDROcodone-acetaminophen (Norco)  MG per tab 1 tablet, 1 tablet, Oral, Q4H PRN     Prior to Admission Medications   No current outpatient medications on file.               PHYSICAL EXAM:      Vital Signs: BP (!) 159/98 (BP Location: Right arm)   Pulse 85   Temp 98.2 °F (36.8 °C) (Oral)   Resp 20   Ht 6' 2\" (1.88 m)   Wt 245 lb (111.1 kg)   SpO2 99%   BMI 31.46 kg/m²   Temp (24hrs), Av.4 °F (36.9 °C), Min:97.8 °F (36.6 °C), Max:98.8 °F (37.1 °C)        Intake/Output Summary (Last 24 hours) at 2024 1554  Last data filed at 2024 0650      Gross per 24 hour   Intake --   Output 700 ml   Net -700 ml          Wt Readings from Last 3 Encounters:   24 245 lb (111.1 kg)   24 245 lb (111.1 kg)   24 245 lb (111.1 kg)         General: NAD  HEENT: NCAT, MMM, EOMI  Neck: Supple   Cardiac: Regular rate and rhythm   Lungs: CTAB   Abdomen: Soft, non-tender, non-distended   : No CVA tenderness  Extremities: no leg edema  Neurologic/Psych: mentating well, no asterixis  Skin: No rashes     LABORATORY DATA:               Lab Results   Component Value Date      (H) 2024     BUN 74 (H) 2024     BUNCREA 13.0 2022     CREATSERUM 8.28 (H) 2024     ANIONGAP 13 2024     GFR 59 (L) 2018     GFRNAA 30 (L) 2022     GFRAA 35 (L) 2022     CA 8.4 (L) 2024     OSMOCALC 317 (H)  09/30/2024     ALKPHO 71 09/29/2024     AST 14 (L) 09/29/2024     ALT 9 (L) 09/29/2024     BILT 0.3 09/29/2024     TP 7.2 09/29/2024     ALB 4.0 09/30/2024     GLOBULIN 3.5 09/29/2024      09/30/2024     K 4.2 09/30/2024      09/30/2024     CO2 20.0 (L) 09/30/2024            Lab Results   Component Value Date     WBC 5.6 09/30/2024     RBC 2.75 (L) 09/30/2024     HGB 8.7 (L) 09/30/2024     HCT 24.9 (L) 09/30/2024     .0 09/30/2024     MPV 11.5 12/14/2012     MCV 90.5 09/30/2024     MCH 31.6 09/30/2024     MCHC 34.9 09/30/2024     RDW 13.2 09/30/2024     NEPRELIM 3.67 09/30/2024     NEPERCENT 65.0 09/30/2024     LYPERCENT 19.9 09/30/2024     MOPERCENT 9.9 09/30/2024     EOPERCENT 3.7 09/30/2024     BAPERCENT 1.1 09/30/2024     NE 3.67 09/30/2024     LYMABS 1.12 09/30/2024     MOABSO 0.56 09/30/2024     EOABSO 0.21 09/30/2024     BAABSO 0.06 09/30/2024            Lab Results   Component Value Date     MALBP 167.00 05/24/2023     CREUR 142.00 05/24/2023     CREUR 142.00 05/24/2023            Lab Results   Component Value Date     COLORUR Colorless (A) 09/16/2024     CLARITY Clear 09/16/2024     SPECGRAVITY 1.012 09/16/2024     GLUUR Normal 09/16/2024     BILUR Negative 09/16/2024     KETUR Negative 09/16/2024     BLOODURINE 3+ (A) 09/16/2024     PHURINE 6.0 09/16/2024     PROUR 50 (A) 09/16/2024     UROBILINOGEN Normal 09/16/2024     NITRITE Negative 09/16/2024     LEUUR Negative 09/16/2024     WBCUR 1-5 09/16/2024     RBCUR 0-2 09/16/2024     EPIUR Few (A) 09/16/2024     BACUR None Seen 09/16/2024     CAOXUR Occasional (A) 04/20/2018     HYLUR Present (A) 05/14/2019            IMAGING:      All imaging studies personally reviewed.     CT CHEST(CONTRAST ONLY) (CPT=71260)     Result Date: 9/29/2024  CONCLUSION:  1. Right hilar and right mediastinal lymph nodes have increased slightly in size and are probably reactive. 2. Wedge-shaped areas of density in the right lung are unchanged and probably a  combination of chronic scar and atelectasis. 3. Residual prior mitral valve surgery is noted. 4. There is otherwise no acute CT finding.   LOCATION:  [facility]   Dictated by (CST): Adrian Blevins MD on 9/29/2024 at 5:05 PM     Finalized by (CST): Adrian Blevins MD on 9/29/2024 at 5:09 PM            ASSESSMENT/PLAN:   Godwin Fonseca is a a(n) 44 year old male w ho ESRD on HD MWF, hyperaldosteronism, DM, bipolar disorder who presented with pain and discharge at dialysis catheter site.     ESRD on HD  -- on HD MWF, but missed Friday   -- plan for HD today. 3K, 2.5Ca, UF as tolerated. Per RN had high arterial pressures that did not resolve w switching the lines, ran at -375.   -- renally dose meds. Avoid morphine     Anemia in ESRD  -- epo if BPs ok and hemoglobin < 10      Hyperphosphatemia  -- sevelamer      HTN  -- continue home meds     HD catheter ?infection/discharge  -- sp CT chest without evidence of tunneled infection or deeper collection. On abx. Sp cultures. per ID appreciated      D/w RN and Dr Jesus      Thank you for allowing me to participate in the care of your patient. Please do not hesitate to contact me with concerns or questions.     Samaria Nava MD  [facility] Nephrology                    Medications 09/29/24 09/30/24 10/01/24   ARIPiprazole (Abilify) tab 15 mg  Dose: 15 mg  Freq: Daily Route: OR  Start: 09/30/24 0930     1426 JM-Given        0930          buPROPion ER (Wellbutrin XL) 24 hr tab 150 mg  Dose: 150 mg  Freq: Daily Route: OR  Start: 09/30/24 0930   Admin Instructions:   DO NOT CRUSH, SPLIT, OR CHEW TABLET     1426 JM-Given        0930          carvedilol (Coreg) tab 25 mg  Dose: 25 mg  Freq: 2 times daily with meals Route: OR  Start: 09/29/24 1830 2001 JM-Given        (0800 JM)-Not Given [C]   1821 JM-Given       0800   1800         ceFAZolin (Ancef) 1 g in dextrose 5% 100mL IVPB-ADD  Dose: 1 g  Freq: Every 24 hours Route: IV  Last Dose: 1 g (09/30/24 5995)  Start:  09/29/24 1830   Order specific questions:       2002 JM-New Bag        1822 JM-New Bag        1830          cloNIDine (Catapres) tab 0.1 mg  Dose: 0.1 mg  Freq: 2 times daily Route: OR  Start: 09/29/24 2100 2045 XO-Given        (0900 JM)-Not Given   2025 VC-Given       0900 2100         FLUoxetine (PROzac) cap 40 mg  Dose: 40 mg  Freq: Daily Route: OR  Start: 09/30/24 0930     1426 JM-Given                  heparin (Porcine) 1000 UNIT/ML injection 4,000 Units  Dose: 4,000 Units  Freq: Once Route: IV  Start: 09/30/24 1245 End: 09/30/24 1311   Admin Instructions:   To lock HD catheter.     1311 JM-Given by Other [C]           heparin (Porcine) 5000 UNIT/ML injection 5,000 Units  Dose: 5,000 Units  Freq: Every 8 hours scheduled Route: SC  Start: 09/29/24 2200 2045 XO-Given        0511 XO-Given   1339 JM-Given   2027 VC-Given      0543 VC-Given   1400 2200        hydrALAZINE (Apresoline) tab 50 mg  Dose: 50 mg  Freq: 2 times daily Route: OR  Start: 09/29/24 2100 2045 XO-Given        1426 JM-Given   2026 VC-Given       0900 2100         HYDROcodone-acetaminophen (Norco) 5-325 MG per tab 2 tablet  Dose: 2 tablet  Freq: Once Route: OR  Start: 09/29/24 1445 End: 09/29/24 1447    1447 JJ-Given              vancomycin (Vancocin) 2.25 g in sodium chloride 0.9% 500 mL IVPB premix  Dose: 25 mg/kg  Weight Dosing Info: 111.1 kg  Freq: Once Route: IV  Last Dose: 2,250 mg (09/30/24 1344)  Start: 09/30/24 1045 End: 09/30/24 1614      HYDROcodone-acetaminophen (Norco)  MG per tab 1 tablet  Dose: 1 tablet  Freq: Every 4 hours PRN Route: OR  PRN Reason: moderate pain  Start: 09/29/24 2021 2047 XO-Given        0047 XO-Given   (0509 XO)-Not Given   0639 XO-Given     1339 JM-Given   (2019 VC)-Not Given

## 2024-10-01 NOTE — PLAN OF CARE
Assumed care at 0730  A/Ox4. KATHIA VASQUEZR on tele  C/o neck pain- Norco given with relief  PIV SL   Urinal at bedside   Up SBA   Safety precautions in place. All needs met at this time     Problem: PAIN - ADULT  Goal: Verbalizes/displays adequate comfort level or patient's stated pain goal  Description: INTERVENTIONS:  - Encourage pt to monitor pain and request assistance  - Assess pain using appropriate pain scale  - Administer analgesics based on type and severity of pain and evaluate response  - Implement non-pharmacological measures as appropriate and evaluate response  - Consider cultural and social influences on pain and pain management  - Manage/alleviate anxiety  - Utilize distraction and/or relaxation techniques  - Monitor for opioid side effects  - Notify MD/LIP if interventions unsuccessful or patient reports new pain  - Anticipate increased pain with activity and pre-medicate as appropriate  Outcome: Progressing     Problem: SAFETY ADULT - FALL  Goal: Free from fall injury  Description: INTERVENTIONS:  - Assess pt frequently for physical needs  - Identify cognitive and physical deficits and behaviors that affect risk of falls.  - [place] fall precautions as indicated by assessment.  - Educate pt/family on patient safety including physical limitations  - Instruct pt to call for assistance with activity based on assessment  - Modify environment to reduce risk of injury  - Provide assistive devices as appropriate  - Consider OT/PT consult to assist with strengthening/mobility  - Encourage toileting schedule  Outcome: Progressing     Problem: DISCHARGE PLANNING  Goal: Discharge to home or other facility with appropriate resources  Description: INTERVENTIONS:  - Identify barriers to discharge w/pt and caregiver  - Include patient/family/discharge partner in discharge planning  - Arrange for needed discharge resources and transportation as appropriate  - Identify discharge learning needs (meds, wound care,  etc)  - Arrange for interpreters to assist at discharge as needed  - Consider post-discharge preferences of patient/family/discharge partner  - Complete POLST form as appropriate  - Assess patient's ability to be responsible for managing their own health  - Refer to Case Management Department for coordinating discharge planning if the patient needs post-hospital services based on physician/LIP order or complex needs related to functional status, cognitive ability or social support system  Outcome: Progressing

## 2024-10-01 NOTE — PHYSICAL THERAPY NOTE
Reviewed pt's chart and discuss pt with RN. At this time pt, reports that at this time does not have any issues with  balance or gait. PT will sign off. Please re-order if needed.

## 2024-10-01 NOTE — CM/SW NOTE
Noted DC order placed. SEFERINO received order for outpatient antibiotics stating 'pt will need IV ABX with HD on dc x 2 weeks. I put in scripts. There's one for vancomycin. But there's 3 for cefazolin.' Chart reviewed, pt has three cefazolin scripts (one for Monday, one for Wednesday, one for Friday) and one script for Vanco (needed MWF).     SEFERINO called [facility] [phone] and spoke with Savannah. Savannah stated pt receives HD at [facility] on [address] in [place]. Savannah provided this writer with contact # for [facility] ([phone]). Previous records show pt has HD at [facility].    SEFERINO called [facility] ([phone]) and spoke with JASON Graham. Gladys stated the [facility] clinic is able to administer the IV antibiotics. Gladys stated the [facility] clinic has the cefazolin and vanco in stock. SEFERINO informed Gladys pt will discharge today and will start HD w/IV antibiotics at [facility] tomorrow 10/2. Gladys requested IV antibiotic scripts and dc summary faxed to her at [phone].     SEFERINO faxed IV cefazolin and IV Vanco scripts to [facility] at [phone].     SEFERINO met with pt at bedside and updated him on above information. Pt confirmed he has HD MWF at [facility]. SEFERINO informed pt he will start receiving IV antibiotics with HD for two weeks starting tomorrow 10/2/24.     SEFERINO called [facility] ([phone]) and spoke with Yamini who confirmed the clinic received the scripts. Updated RN.     Addendum (2:40pm) - AVS faxed to [facility] at [phone].     JOSH Hammond  Discharge Planner

## 2024-10-01 NOTE — PROGRESS NOTES
[facility]   part of [facility]    Nephrology Progress Note    Godwin Fonseca Attending:  Laila Ryder MD     Cc: esrd    SUBJECTIVE     No complaints    PHYSICAL EXAM   Vital signs: BP (!) 135/95 (BP Location: Right arm)   Pulse 89   Temp 98 °F (36.7 °C) (Oral)   Resp 19   Ht 6' 2\" (1.88 m)   Wt 245 lb (111.1 kg)   SpO2 94%   BMI 31.46 kg/m²   Temp (24hrs), Av °F (36.7 °C), Min:97.5 °F (36.4 °C), Max:98.2 °F (36.8 °C)       Intake/Output Summary (Last 24 hours) at 10/1/2024 1301  Last data filed at 2024 1330  Gross per 24 hour   Intake --   Output 2000 ml   Net -2000 ml     Wt Readings from Last 3 Encounters:   24 245 lb (111.1 kg)   24 245 lb (111.1 kg)   24 245 lb (111.1 kg)     General: NAD  HEENT: NCAT, EOMI, MMM  Neck: Supple   Cardiac: Regular rate and rhythm   Lungs: CTAB  Abdomen: Soft, non-tender, nondistended   Extremities: No edema  Neurologic: No asterxis  Skin: Warm and dry, no rashes     MEDS     Current Facility-Administered Medications   Medication Dose Route Frequency    Vancomycin: PHARMACY DOSING  1 each Intravenous See Admin Instructions (RX holding)    heparin (Porcine) 1000 UNIT/ML injection 1,500 Units  1.5 mL Intracatheter Once    sodium chloride 0.9 % IV bolus 100 mL  100 mL Intravenous Q30 Min PRN    And    albumin human (Albumin) 25% injection 25 g  25 g Intravenous PRN Dialysis    albuterol (Ventolin HFA) 108 (90 Base) MCG/ACT inhaler 2 puff  2 puff Inhalation Q6H PRN    ARIPiprazole (Abilify) tab 15 mg  15 mg Oral Daily    buPROPion ER (Wellbutrin XL) 24 hr tab 150 mg  150 mg Oral Daily    carvedilol (Coreg) tab 25 mg  25 mg Oral BID with meals    cloNIDine (Catapres) tab 0.1 mg  0.1 mg Oral BID    dicyclomine (Bentyl) cap 10 mg  10 mg Oral TID PRN    FLUoxetine (PROzac) cap 40 mg  40 mg Oral Daily    hydrALAZINE (Apresoline) tab 50 mg  50 mg Oral BID    isosorbide mononitrate ER (Imdur) 24 hr tab 30 mg  30 mg Oral Daily    lamoTRIgine  (LaMICtal) tab 150 mg  150 mg Oral Daily    losartan (Cozaar) tab 100 mg  100 mg Oral Daily    NIFEdipine ER (Procardia-XL) 24 hr tab 60 mg  60 mg Oral BID    ondansetron (Zofran-ODT) disintegrating tab 4 mg  4 mg Oral Q4H PRN    sevelamer carbonate (Renvela) tab 800 mg  800 mg Oral TID CC    tamsulosin (Flomax) cap 0.4 mg  0.4 mg Oral Daily    umeclidinium bromide (Incruse Ellipta) 62.5 MCG/ACT inhaler 1 puff  1 puff Inhalation Daily    heparin (Porcine) 5000 UNIT/ML injection 5,000 Units  5,000 Units Subcutaneous Q8H MARTHA    acetaminophen (Tylenol Extra Strength) tab 500 mg  500 mg Oral Q4H PRN    polyethylene glycol (PEG 3350) (Miralax) 17 g oral packet 17 g  17 g Oral Daily PRN    sennosides (Senokot) tab 17.2 mg  17.2 mg Oral Nightly PRN    bisacodyl (Dulcolax) 10 MG rectal suppository 10 mg  10 mg Rectal Daily PRN    ondansetron (Zofran) 4 MG/2ML injection 4 mg  4 mg Intravenous Q6H PRN    prochlorperazine (Compazine) 10 MG/2ML injection 5 mg  5 mg Intravenous Q8H PRN    ceFAZolin (Ancef) 1 g in dextrose 5% 100mL IVPB-ADD  1 g Intravenous Q24H    HYDROcodone-acetaminophen (Norco)  MG per tab 1 tablet  1 tablet Oral Q4H PRN       LABS        IMAGING   All imaging studies personally reviewed.    CT CHEST(CONTRAST ONLY) (CPT=71260)   Final Result   PROCEDURE:  CT CHEST(CONTRAST ONLY) (CPT=71260)       COMPARISON:  [facility], CT, CTA CHEST + CT ABD (W) + CT PEL (W)    SH(CPT=71275/19191), 11/23/2023, 4:31 PM.       INDICATIONS:  ID request - possible tunneled R subclavian catheter    infection.       TECHNIQUE:  CT images were obtained with non-ionic intravenous contrast    material. Dose reduction techniques were used. Dose information is    transmitted to the ACR (American College of Radiology) NRDR (National    Radiology Data Registry) which includes the    Dose Index Registry.       PATIENT STATED HISTORY:(As transcribed by Technologist)   body aches,    chill, night sweats- noticed drainage from  dialysis cath this am        CONTRAST USED:  75cc of Isovue 370       FINDINGS:     LUNGS:  Multifocal dependent wedge-shaped areas of consolidation in lower    lobes are noted and appears similar to previous study likely indicating    chronic postinflammatory scar and atelectasis.  There is emphysematous    change noted in upper lobes.   VASCULATURE:  No visible pulmonary arterial thrombus or attenuation.     BASIL:  Right hilar lymph node measures 2.8 x 1.7 cm (previously 2.5 x 1.5    cm).    MEDIASTINUM:  Low right paratracheal lymph node series 2, image 41    measures 2.2 x 1.4 cm (previously 2.2 x 1.5 cm).   CARDIAC:  Metallic densities within the mitral valve are presumably from    prior surgical reconstruction or replacement of the valve.   PLEURA:  No mass or effusion.     THORACIC AORTA:  No aneurysm.     CHEST WALL:  Right internal jugular tunneled dialysis catheter is noted.     The tip of this is in SVC.  The entirety of the catheter is not included    on the chest CT.  There is no acute abnormality associated with the    catheter within the limits of this    study.     LIMITED ABDOMEN:  Limited images of the upper abdomen are unremarkable.     BONES:  No bony lesion or fracture.                           =====   CONCLUSION:     1. Right hilar and right mediastinal lymph nodes have increased slightly    in size and are probably reactive.   2. Wedge-shaped areas of density in the right lung are unchanged and    probably a combination of chronic scar and atelectasis.   3. Residual prior mitral valve surgery is noted.   4. There is otherwise no acute CT finding.           LOCATION:  [place]           Dictated by (CST): Adrian Blevins MD on 9/29/2024 at 5:05 PM        Finalized by (CST): Adrian Blevins MD on 9/29/2024 at 5:09 PM         XR CHEST AP PORTABLE  (CPT=71045)   Final Result   PROCEDURE:  XR CHEST AP PORTABLE  (CPT=71045)       TECHNIQUE:  AP chest radiograph was obtained.       COMPARISON:   [facility], XR, XR CHEST AP PORTABLE  (CPT=71045), 9/15/2024,    8:01 PM.       INDICATIONS:  fatigue,pain and oozing from port nausea and vomiting . seen    yesterday for same symptoms       PATIENT STATED HISTORY: (As transcribed by Technologist)  Pt c/o fatigue    and nausea. Hx of heart surgery.            FINDINGS:  Valve prosthesis noted.  Cardiomegaly with normal pulmonary    vascularity.  Small right pleural effusion.  Right dialysis catheter tips    in the SVC.  No evidence of pneumothorax.                         =====   CONCLUSION:  No lobar pneumonia or overt congestive failure.  Small right    pleural effusion.           LOCATION:  [id number]                       Dictated by (CST): Jj Conley MD on 9/29/2024 at 2:44 PM        Finalized by (CST): Jj Conley MD on 9/29/2024 at 2:44 PM               ASSESSMENT & PLAN    44 year old male w ho ESRD on HD MWF, hyperaldosteronism, DM, bipolar disorder who presented with pain and discharge at dialysis catheter site.     ESRD on HD  -- on HD MWF, but missed Friday pta  -- sp HD yesterday, if remains in house plan for HD tomorrow. 3K, 2.5Ca, UF as tolerated.   -- renally dose meds. Avoid morphine     Anemia in ESRD  -- epo if BPs ok and hemoglobin < 10      Hyperphosphatemia  -- sevelamer      HTN  -- continue home meds     HD catheter ?infection/discharge  -- sp CT chest without evidence of tunneled infection or deeper collection. On abx. Sp cultures. per ID appreciated     SW to confirm HD unit has the abx needed from ID team prior to discharge     D/w Dr Ryder and the ID team     Thank you for allowing me to participate in the care of this patient. Please do not hesitate to call with questions or concerns.       Samaria Nava MD  [facility] Nephrology

## 2024-10-01 NOTE — PAYOR COMM NOTE
Discharge Notification    Patient Name: CHU VACA  Payor: [employer]  Subscriber #: [id number]  Authorization Number: [id number]  Admit Date/Time: 9/29/2024 1:23 PM  Discharge Date/Time: 10/1/2024 2:34 PM

## 2024-10-01 NOTE — PROGRESS NOTES
[facility] Pharmacy Dosing Service      Follow Up Pharmacokinetic Consult for Vancomycin Dosing     Godwin Fonseca is a 44 year old male who is receiving vancomycin therapy for bacteremia. Patient is on day 1 of vancomycin and is currently receiving     per levels. The current treatment and monitoring approach is non-AUC strategy.        Weight and Temperature:    Wt Readings from Last 1 Encounters:   24 111.1 kg (245 lb)         Temp Readings from Last 1 Encounters:   10/01/24 97.9 °F (36.6 °C) (Oral)      Labs:   Recent Labs   Lab 24  0637 24  1339 24  0813   CREATSERUM 9.94* 9.38* 8.28*      Estimated Creatinine Clearance: 13 mL/min (A) (based on SCr of 8.28 mg/dL (H)).     Recent Labs   Lab 24  0637 24  1339 24  0813   WBC 7.2 6.9 5.6        Vancomycin Levels:  Lab Results   Component Value Date/Time    VANCR 40.3 (H) 10/01/2024 07:39 AM       Corresponding 24 h-AUC: N/A     The Pharmacokinetic Target is:    Trough/random 15-20 mg/L (applies to IV dosing in HD and IP dosing in APD)    Renal Dosing Considerations:    HD: Home regimen: MWF     Assessment/Plan:   Maintenance Regimen:  no vancomycin today as random level=40.3    Monitorin) Plan for vancomycin random level to be obtained  w/AM labs on 10/02    2) Pharmacy will order SCr as clinically indicated to assess renal function.    3) Pharmacy will monitor for toxicity and efficacy, adjust vancomycin dose and/or frequency, and order vancomycin levels as appropriate per the Pharmacy and Therapeutics Committee approved protocol until discontinuation of the medication.       We appreciate the opportunity to assist in the care of this patient.     Viktoriya DONOHUE RPH  10/1/2024  10:02 AM  [facility]  Pharmacy Extension: [phone]

## 2024-10-01 NOTE — PROGRESS NOTES
[facility]   part of [facility] Infectious Disease Progress Note    Godwin Fonseca Patient Status:  Inpatient    1980 MRN [id number]   [facility] 3NE-A Attending Laila Ryder MD   Hosp Day # 2 PCP Adrian Small MD     Subjective:  Pt state HD cath site is improving.     Objective:    Allergies:  Allergies   Allergen Reactions    Hydrochlorothiazide RASH and HIVES       Medications:    Current Facility-Administered Medications:     Vancomycin: PHARMACY DOSING, 1 each, Intravenous, See Admin Instructions (RX holding)    heparin (Porcine) 1000 UNIT/ML injection 1,500 Units, 1.5 mL, Intracatheter, Once    sodium chloride 0.9 % IV bolus 100 mL, 100 mL, Intravenous, Q30 Min PRN **AND** albumin human (Albumin) 25% injection 25 g, 25 g, Intravenous, PRN Dialysis    albuterol (Ventolin HFA) 108 (90 Base) MCG/ACT inhaler 2 puff, 2 puff, Inhalation, Q6H PRN    ARIPiprazole (Abilify) tab 15 mg, 15 mg, Oral, Daily    buPROPion ER (Wellbutrin XL) 24 hr tab 150 mg, 150 mg, Oral, Daily    carvedilol (Coreg) tab 25 mg, 25 mg, Oral, BID with meals    cloNIDine (Catapres) tab 0.1 mg, 0.1 mg, Oral, BID    dicyclomine (Bentyl) cap 10 mg, 10 mg, Oral, TID PRN    FLUoxetine (PROzac) cap 40 mg, 40 mg, Oral, Daily    hydrALAZINE (Apresoline) tab 50 mg, 50 mg, Oral, BID    isosorbide mononitrate ER (Imdur) 24 hr tab 30 mg, 30 mg, Oral, Daily    lamoTRIgine (LaMICtal) tab 150 mg, 150 mg, Oral, Daily    losartan (Cozaar) tab 100 mg, 100 mg, Oral, Daily    NIFEdipine ER (Procardia-XL) 24 hr tab 60 mg, 60 mg, Oral, BID    ondansetron (Zofran-ODT) disintegrating tab 4 mg, 4 mg, Oral, Q4H PRN    sevelamer carbonate (Renvela) tab 800 mg, 800 mg, Oral, TID CC    tamsulosin (Flomax) cap 0.4 mg, 0.4 mg, Oral, Daily    umeclidinium bromide (Incruse Ellipta) 62.5 MCG/ACT inhaler 1 puff, 1 puff, Inhalation, Daily    heparin (Porcine) 5000 UNIT/ML injection 5,000 Units, 5,000 Units, Subcutaneous, Q8H MARTHA     acetaminophen (Tylenol Extra Strength) tab 500 mg, 500 mg, Oral, Q4H PRN    polyethylene glycol (PEG 3350) (Miralax) 17 g oral packet 17 g, 17 g, Oral, Daily PRN    sennosides (Senokot) tab 17.2 mg, 17.2 mg, Oral, Nightly PRN    bisacodyl (Dulcolax) 10 MG rectal suppository 10 mg, 10 mg, Rectal, Daily PRN    ondansetron (Zofran) 4 MG/2ML injection 4 mg, 4 mg, Intravenous, Q6H PRN    prochlorperazine (Compazine) 10 MG/2ML injection 5 mg, 5 mg, Intravenous, Q8H PRN    ceFAZolin (Ancef) 1 g in dextrose 5% 100mL IVPB-ADD, 1 g, Intravenous, Q24H    HYDROcodone-acetaminophen (Norco)  MG per tab 1 tablet, 1 tablet, Oral, Q4H PRN    Physical Exam:  General: Alert, orientated x3.  Cooperative.  No apparent distress.  Vital Signs:  Blood pressure (!) 135/95, pulse 89, temperature 98 °F (36.7 °C), temperature source Oral, resp. rate 19, height 188 cm (6' 2\"), weight 245 lb (111.1 kg), SpO2 94%.   Temp (24hrs), Av °F (36.7 °C), Min:97.5 °F (36.4 °C), Max:98.2 °F (36.8 °C)      HEENT: Exam is unremarkable.  Without scleral icterus.  Mucous membranes are moist. PERRLA.  Oropharynx is clear.  Neck: No tenderness to palpitation.  Full range of motion to flexion and extension, lateral rotation and lateral flexion of cervical spine.  No JVD. Supple.   Lungs: Clear to auscultation bilaterally.  Cardiac: Regular rate and rhythm. No murmur.  Abdomen:  Soft, non-distended, non-tender, with no rebound or guarding.   Extremities:  No lower extremity edema noted.  Without clubbing or cyanosis.    Skin: Normal texture and turgor.  Neurologic: Cranial nerves are grossly intact.  Motor strength and sensory examination is grossly normal.  No focal neurologic deficit.    Assessment/Plan:    1.  HD line site infection/cellulitis  -blood cultures NGTD  -on IV vancomycin and cefazolin  2.  ESRD on HD  3.  Dispo  -IV vancomycin and cefazolin with HD x 2 weeks, rx in chart, order for SW placed    If you have any questions or concerns please  call [facility] Infectious Disease at [phone].     Gurvinder Young, APRN

## 2024-10-01 NOTE — PLAN OF CARE
Assumed care at 1930. A&Ox4. RA. NSR on tele. L Arm precautions, fistula in place. PIV R AC saline locked. Renal diet. Urinal at the bedside. Pt denies any pain at this time. Up stand by. Heparin for VTE. Call light within reach, bed in lowest position. Will continue with plan of care.      Problem: PAIN - ADULT  Goal: Verbalizes/displays adequate comfort level or patient's stated pain goal  Description: INTERVENTIONS:  - Encourage pt to monitor pain and request assistance  - Assess pain using appropriate pain scale  - Administer analgesics based on type and severity of pain and evaluate response  - Implement non-pharmacological measures as appropriate and evaluate response  - Consider cultural and social influences on pain and pain management  - Manage/alleviate anxiety  - Utilize distraction and/or relaxation techniques  - Monitor for opioid side effects  - Notify MD/LIP if interventions unsuccessful or patient reports new pain  - Anticipate increased pain with activity and pre-medicate as appropriate  Outcome: Progressing     Problem: SAFETY ADULT - FALL  Goal: Free from fall injury  Description: INTERVENTIONS:  - Assess pt frequently for physical needs  - Identify cognitive and physical deficits and behaviors that affect risk of falls.  - [place] fall precautions as indicated by assessment.  - Educate pt/family on patient safety including physical limitations  - Instruct pt to call for assistance with activity based on assessment  - Modify environment to reduce risk of injury  - Provide assistive devices as appropriate  - Consider OT/PT consult to assist with strengthening/mobility  - Encourage toileting schedule  Outcome: Progressing     Problem: DISCHARGE PLANNING  Goal: Discharge to home or other facility with appropriate resources  Description: INTERVENTIONS:  - Identify barriers to discharge w/pt and caregiver  - Include patient/family/discharge partner in discharge planning  - Arrange for needed  discharge resources and transportation as appropriate  - Identify discharge learning needs (meds, wound care, etc)  - Arrange for interpreters to assist at discharge as needed  - Consider post-discharge preferences of patient/family/discharge partner  - Complete POLST form as appropriate  - Assess patient's ability to be responsible for managing their own health  - Refer to Case Management Department for coordinating discharge planning if the patient needs post-hospital services based on physician/LIP order or complex needs related to functional status, cognitive ability or social support system  Outcome: Progressing     Problem: Patient/Family Goals  Goal: Patient/Family Long Term Goal  Description: Patient's Long Term Goal: discharge    Interventions:  - follow plan of care  - See additional Care Plan goals for specific interventions  Outcome: Progressing  Goal: Patient/Family Short Term Goal  Description: Patient's Short Term Goal: pain management    Interventions:   - take PRN pain medication  - See additional Care Plan goals for specific interventions  Outcome: Progressing

## 2024-10-03 NOTE — PAYOR COMM NOTE
RECONSIDERATION      CONTINUED STAY REVIEW    Payor: [employer]  Subscriber #:  [id number]  Authorization Number: [id number]    Admit date: 24  Admit time:  5:45 PM    REVIEW DOCUMENTATION 10/1/2024       Gurvinder Young APRN   Nurse Practitioner  Infectious Disease     Progress Notes     Signed     Date of Service: 10/1/2024 12:53 PM     Signed       [facility]   part of [facility] Infectious Disease Progress Note           Godwin Fonseca Patient Status:  Inpatient    1980 MRN [id number]   Location [facility] 3NE-A Attending Laila Ryder MD   Hosp Day # 2 PCP Adrian Small MD      Subjective:  Pt state HD cath site is improving.      Objective:     Allergies:  Allergies        Allergies   Allergen Reactions    Hydrochlorothiazide RASH and HIVES            Medications:    Current Hospital Medications      Current Facility-Administered Medications:     Vancomycin: PHARMACY DOSING, 1 each, Intravenous, See Admin Instructions (RX holding)    heparin (Porcine) 1000 UNIT/ML injection 1,500 Units, 1.5 mL, Intracatheter, Once    sodium chloride 0.9 % IV bolus 100 mL, 100 mL, Intravenous, Q30 Min PRN **AND** albumin human (Albumin) 25% injection 25 g, 25 g, Intravenous, PRN Dialysis    albuterol (Ventolin HFA) 108 (90 Base) MCG/ACT inhaler 2 puff, 2 puff, Inhalation, Q6H PRN    ARIPiprazole (Abilify) tab 15 mg, 15 mg, Oral, Daily    buPROPion ER (Wellbutrin XL) 24 hr tab 150 mg, 150 mg, Oral, Daily    carvedilol (Coreg) tab 25 mg, 25 mg, Oral, BID with meals    cloNIDine (Catapres) tab 0.1 mg, 0.1 mg, Oral, BID    dicyclomine (Bentyl) cap 10 mg, 10 mg, Oral, TID PRN    FLUoxetine (PROzac) cap 40 mg, 40 mg, Oral, Daily    hydrALAZINE (Apresoline) tab 50 mg, 50 mg, Oral, BID    isosorbide mononitrate ER (Imdur) 24 hr tab 30 mg, 30 mg, Oral, Daily    lamoTRIgine (LaMICtal) tab 150 mg, 150 mg, Oral, Daily    losartan (Cozaar) tab 100 mg, 100 mg, Oral,  Daily    NIFEdipine ER (Procardia-XL) 24 hr tab 60 mg, 60 mg, Oral, BID    ondansetron (Zofran-ODT) disintegrating tab 4 mg, 4 mg, Oral, Q4H PRN    sevelamer carbonate (Renvela) tab 800 mg, 800 mg, Oral, TID CC    tamsulosin (Flomax) cap 0.4 mg, 0.4 mg, Oral, Daily    umeclidinium bromide (Incruse Ellipta) 62.5 MCG/ACT inhaler 1 puff, 1 puff, Inhalation, Daily    heparin (Porcine) 5000 UNIT/ML injection 5,000 Units, 5,000 Units, Subcutaneous, Q8H MARTHA    acetaminophen (Tylenol Extra Strength) tab 500 mg, 500 mg, Oral, Q4H PRN    polyethylene glycol (PEG 3350) (Miralax) 17 g oral packet 17 g, 17 g, Oral, Daily PRN    sennosides (Senokot) tab 17.2 mg, 17.2 mg, Oral, Nightly PRN    bisacodyl (Dulcolax) 10 MG rectal suppository 10 mg, 10 mg, Rectal, Daily PRN    ondansetron (Zofran) 4 MG/2ML injection 4 mg, 4 mg, Intravenous, Q6H PRN    prochlorperazine (Compazine) 10 MG/2ML injection 5 mg, 5 mg, Intravenous, Q8H PRN    ceFAZolin (Ancef) 1 g in dextrose 5% 100mL IVPB-ADD, 1 g, Intravenous, Q24H    HYDROcodone-acetaminophen (Norco)  MG per tab 1 tablet, 1 tablet, Oral, Q4H PRN        Physical Exam:  General: Alert, orientated x3.  Cooperative.  No apparent distress.  Vital Signs:  Blood pressure (!) 135/95, pulse 89, temperature 98 °F (36.7 °C), temperature source Oral, resp. rate 19, height 188 cm (6' 2\"), weight 245 lb (111.1 kg), SpO2 94%.   Temp (24hrs), Av °F (36.7 °C), Min:97.5 °F (36.4 °C), Max:98.2 °F (36.8 °C)        HEENT: Exam is unremarkable.  Without scleral icterus.  Mucous membranes are moist. PERRLA.  Oropharynx is clear.  Neck: No tenderness to palpitation.  Full range of motion to flexion and extension, lateral rotation and lateral flexion of cervical spine.  No JVD. Supple.   Lungs: Clear to auscultation bilaterally.  Cardiac: Regular rate and rhythm. No murmur.  Abdomen:  Soft, non-distended, non-tender, with no rebound or guarding.   Extremities:  No lower extremity edema noted.  Without  clubbing or cyanosis.    Skin: Normal texture and turgor.  Neurologic: Cranial nerves are grossly intact.  Motor strength and sensory examination is grossly normal.  No focal neurologic deficit.     Assessment/Plan:     1.  HD line site infection/cellulitis  -blood cultures NGTD  -on IV vancomycin and cefazolin  2.  ESRD on HD  3.  Dispo  -IV vancomycin and cefazolin with HD x 2 weeks, rx in chart, order for SW placed     If you have any questions or concerns please call [facility] Infectious Disease at [phone].      Gurvinder Young, PILAR                   10/1/2024 Nephrology Progress Note    [facility]   part of [facility]     Nephrology Progress Note     Godwinlawanda Fonseca Attending:  Laila Ryder MD      Cc: esrd     SUBJECTIVE      No complaints     PHYSICAL EXAM   Vital signs: BP (!) 135/95 (BP Location: Right arm)   Pulse 89   Temp 98 °F (36.7 °C) (Oral)   Resp 19   Ht 6' 2\" (1.88 m)   Wt 245 lb (111.1 kg)   SpO2 94%   BMI 31.46 kg/m²   Temp (24hrs), Av °F (36.7 °C), Min:97.5 °F (36.4 °C), Max:98.2 °F (36.8 °C)        Intake/Output Summary (Last 24 hours) at 10/1/2024 1301  Last data filed at 2024 1330      Gross per 24 hour   Intake --   Output 2000 ml   Net -2000 ml          Wt Readings from Last 3 Encounters:   24 245 lb (111.1 kg)   24 245 lb (111.1 kg)   24 245 lb (111.1 kg)      General: NAD  HEENT: NCAT, EOMI, MMM  Neck: Supple   Cardiac: Regular rate and rhythm   Lungs: CTAB  Abdomen: Soft, non-tender, nondistended   Extremities: No edema  Neurologic: No asterxis  Skin: Warm and dry, no rashes     MEDS      Current Hospital Medications          Current Facility-Administered Medications   Medication Dose Route Frequency    Vancomycin: PHARMACY DOSING  1 each Intravenous See Admin Instructions (RX holding)    heparin (Porcine) 1000 UNIT/ML injection 1,500 Units  1.5 mL Intracatheter Once    sodium chloride 0.9 % IV bolus 100 mL  100 mL Intravenous Q30  Min PRN     And    albumin human (Albumin) 25% injection 25 g  25 g Intravenous PRN Dialysis    albuterol (Ventolin HFA) 108 (90 Base) MCG/ACT inhaler 2 puff  2 puff Inhalation Q6H PRN    ARIPiprazole (Abilify) tab 15 mg  15 mg Oral Daily    buPROPion ER (Wellbutrin XL) 24 hr tab 150 mg  150 mg Oral Daily    carvedilol (Coreg) tab 25 mg  25 mg Oral BID with meals    cloNIDine (Catapres) tab 0.1 mg  0.1 mg Oral BID    dicyclomine (Bentyl) cap 10 mg  10 mg Oral TID PRN    FLUoxetine (PROzac) cap 40 mg  40 mg Oral Daily    hydrALAZINE (Apresoline) tab 50 mg  50 mg Oral BID    isosorbide mononitrate ER (Imdur) 24 hr tab 30 mg  30 mg Oral Daily    lamoTRIgine (LaMICtal) tab 150 mg  150 mg Oral Daily    losartan (Cozaar) tab 100 mg  100 mg Oral Daily    NIFEdipine ER (Procardia-XL) 24 hr tab 60 mg  60 mg Oral BID    ondansetron (Zofran-ODT) disintegrating tab 4 mg  4 mg Oral Q4H PRN    sevelamer carbonate (Renvela) tab 800 mg  800 mg Oral TID CC    tamsulosin (Flomax) cap 0.4 mg  0.4 mg Oral Daily    umeclidinium bromide (Incruse Ellipta) 62.5 MCG/ACT inhaler 1 puff  1 puff Inhalation Daily    heparin (Porcine) 5000 UNIT/ML injection 5,000 Units  5,000 Units Subcutaneous Q8H MARTHA    acetaminophen (Tylenol Extra Strength) tab 500 mg  500 mg Oral Q4H PRN    polyethylene glycol (PEG 3350) (Miralax) 17 g oral packet 17 g  17 g Oral Daily PRN    sennosides (Senokot) tab 17.2 mg  17.2 mg Oral Nightly PRN    bisacodyl (Dulcolax) 10 MG rectal suppository 10 mg  10 mg Rectal Daily PRN    ondansetron (Zofran) 4 MG/2ML injection 4 mg  4 mg Intravenous Q6H PRN    prochlorperazine (Compazine) 10 MG/2ML injection 5 mg  5 mg Intravenous Q8H PRN    ceFAZolin (Ancef) 1 g in dextrose 5% 100mL IVPB-ADD  1 g Intravenous Q24H    HYDROcodone-acetaminophen (Norco)  MG per tab 1 tablet  1 tablet Oral Q4H PRN            LABS      IMAGING   All imaging studies personally reviewed.     CT CHEST(CONTRAST ONLY) (CPT=71260)   Final Result    PROCEDURE:  CT CHEST(CONTRAST ONLY) (CPT=71260)       COMPARISON:  [facility], CT, CTA CHEST + CT ABD (W) + CT PEL (W)    SH(CPT=71275/45420), 11/23/2023, 4:31 PM.       INDICATIONS:  ID request - possible tunneled R subclavian catheter    infection.       TECHNIQUE:  CT images were obtained with non-ionic intravenous contrast    material. Dose reduction techniques were used. Dose information is    transmitted to the ACR (American College of Radiology) NRDR (National    Radiology Data Registry) which includes the    Dose Index Registry.       PATIENT STATED HISTORY:(As transcribed by Technologist)   body aches,    chill, night sweats- noticed drainage from dialysis cath this am        CONTRAST USED:  75cc of Isovue 370       FINDINGS:     LUNGS:  Multifocal dependent wedge-shaped areas of consolidation in lower    lobes are noted and appears similar to previous study likely indicating    chronic postinflammatory scar and atelectasis.  There is emphysematous    change noted in upper lobes.   VASCULATURE:  No visible pulmonary arterial thrombus or attenuation.     BASIL:  Right hilar lymph node measures 2.8 x 1.7 cm (previously 2.5 x 1.5    cm).    MEDIASTINUM:  Low right paratracheal lymph node series 2, image 41    measures 2.2 x 1.4 cm (previously 2.2 x 1.5 cm).   CARDIAC:  Metallic densities within the mitral valve are presumably from    prior surgical reconstruction or replacement of the valve.   PLEURA:  No mass or effusion.     THORACIC AORTA:  No aneurysm.     CHEST WALL:  Right internal jugular tunneled dialysis catheter is noted.     The tip of this is in SVC.  The entirety of the catheter is not included    on the chest CT.  There is no acute abnormality associated with the    catheter within the limits of this    study.     LIMITED ABDOMEN:  Limited images of the upper abdomen are unremarkable.     BONES:  No bony lesion or fracture.                           =====   CONCLUSION:     1. Right hilar and  right mediastinal lymph nodes have increased slightly    in size and are probably reactive.   2. Wedge-shaped areas of density in the right lung are unchanged and    probably a combination of chronic scar and atelectasis.   3. Residual prior mitral valve surgery is noted.   4. There is otherwise no acute CT finding.           LOCATION:  [facility]           Dictated by (CST): Adrian Blevins MD on 9/29/2024 at 5:05 PM        Finalized by (CST): Adrian Blevins MD on 9/29/2024 at 5:09 PM          XR CHEST AP PORTABLE  (CPT=71045)   Final Result   PROCEDURE:  XR CHEST AP PORTABLE  (CPT=71045)       TECHNIQUE:  AP chest radiograph was obtained.       COMPARISON:  [facility], XR, XR CHEST AP PORTABLE  (CPT=71045), 9/15/2024,    8:01 PM.       INDICATIONS:  fatigue,pain and oozing from port nausea and vomiting . seen    yesterday for same symptoms       PATIENT STATED HISTORY: (As transcribed by Technologist)  Pt c/o fatigue    and nausea. Hx of heart surgery.            FINDINGS:  Valve prosthesis noted.  Cardiomegaly with normal pulmonary    vascularity.  Small right pleural effusion.  Right dialysis catheter tips    in the SVC.  No evidence of pneumothorax.                         =====   CONCLUSION:  No lobar pneumonia or overt congestive failure.  Small right    pleural effusion.           LOCATION:  [id number]                       Dictated by (CST): Jj Conley MD on 9/29/2024 at 2:44 PM        Finalized by (CST): Jj Conley MD on 9/29/2024 at 2:44 PM                   ASSESSMENT & PLAN    44 year old male w ho ESRD on HD MWF, hyperaldosteronism, DM, bipolar disorder who presented with pain and discharge at dialysis catheter site.     ESRD on HD  -- on HD MWF, but missed Friday pta  -- sp HD yesterday, if remains in house plan for HD tomorrow. 3K, 2.5Ca, UF as tolerated.   -- renally dose meds. Avoid morphine     Anemia in ESRD  -- epo if BPs ok and hemoglobin < 10      Hyperphosphatemia  -- sevelamer      HTN  --  continue home meds     HD catheter ?infection/discharge  -- sp CT chest without evidence of tunneled infection or deeper collection. On abx. Sp cultures. per ID appreciated      SW to confirm HD unit has the abx needed from ID team prior to discharge      D/w Dr Ryder and the ID team      Thank you for allowing me to participate in the care of this patient. Please do not hesitate to call with questions or concerns.         Samaria Nava MD  [facility] Nephrology         MEDICATIONS ADMINISTERED IN LAST 1 DAY:  Administration History       None            Vitals (last day) before discharge       Date/Time Temp Pulse Resp BP SpO2 Weight O2 Device O2 Flow Rate (L/min) [facility]    10/01/24 1130 98 °F (36.7 °C) 89 19 135/95 94 % -- None (Room air) 0 L/min     10/01/24 0800 97.9 °F (36.6 °C) 87 20 156/96 96 % -- None (Room air) 0 L/min     10/01/24 0500 98.2 °F (36.8 °C) 92 20 147/93 94 % -- None (Room air) -- AM    09/30/24 2345 98.1 °F (36.7 °C) 86 20 151/89 97 % -- None (Room air) -- AM    09/30/24 2000 98.1 °F (36.7 °C) 88 20 148/102 98 % -- None (Room air) -- AM    09/30/24 1600 97.5 °F (36.4 °C) 96 20 147/93 97 % -- None (Room air) 0 L/min     09/30/24 1316 -- 85 -- 159/98 99 % -- -- -- JM    09/30/24 1200 98.2 °F (36.8 °C) 82 20 163/106 98 % -- None (Room air) 0 L/min     09/30/24 0800 97.8 °F (36.6 °C) 79 19 120/76 96 % -- None (Room air) 0 L/min     09/30/24 0530 98.5 °F (36.9 °C) 77 18 133/82 98 % -- None (Room air) -- AM    09/30/24 0051 -- 79 -- 156/100 99 % -- -- -- XO    09/30/24 0000 -- 82 -- 157/112 95 % -- -- -- XO          CIWA Scores (last 3 days) before discharge       None

## 2024-10-04 NOTE — PAYOR COMM NOTE
Discharge Notification    Patient Name: CHU VACA  Payor: [employer]  Subscriber #: [id number]  Authorization Number: [id number]  Admit Date/Time: 9/29/2024 1:23 PM  Discharge Date/Time: 10/1/2024 2:34 PM

## 2024-10-06 ENCOUNTER — HOSPITAL ENCOUNTER (OUTPATIENT)
Facility: HOSPITAL | Age: 44
Setting detail: OBSERVATION
LOS: 1 days | Discharge: HOME | End: 2024-10-08
Attending: EMERGENCY MEDICINE | Admitting: STUDENT IN AN ORGANIZED HEALTH CARE EDUCATION/TRAINING PROGRAM
Payer: MEDICARE

## 2024-10-06 ENCOUNTER — APPOINTMENT (OUTPATIENT)
Dept: CT IMAGING | Age: 44
End: 2024-10-06
Attending: EMERGENCY MEDICINE
Payer: MEDICARE

## 2024-10-06 DIAGNOSIS — N18.6 TYPE 2 DIABETES MELLITUS WITH CHRONIC KIDNEY DISEASE ON CHRONIC DIALYSIS, WITHOUT LONG-TERM CURRENT USE OF INSULIN (HCC): ICD-10-CM

## 2024-10-06 DIAGNOSIS — E11.22 TYPE 2 DIABETES MELLITUS WITH CHRONIC KIDNEY DISEASE ON CHRONIC DIALYSIS, WITHOUT LONG-TERM CURRENT USE OF INSULIN (HCC): ICD-10-CM

## 2024-10-06 DIAGNOSIS — R10.9 ABDOMINAL PAIN, ACUTE: ICD-10-CM

## 2024-10-06 DIAGNOSIS — K57.31 DIVERTICULOSIS OF COLON WITH HEMORRHAGE: Primary | ICD-10-CM

## 2024-10-06 DIAGNOSIS — Z99.2 TYPE 2 DIABETES MELLITUS WITH CHRONIC KIDNEY DISEASE ON CHRONIC DIALYSIS, WITHOUT LONG-TERM CURRENT USE OF INSULIN (HCC): ICD-10-CM

## 2024-10-06 DIAGNOSIS — N18.6 ESRD (END STAGE RENAL DISEASE) (HCC): ICD-10-CM

## 2024-10-06 DIAGNOSIS — M54.2 NECK PAIN: ICD-10-CM

## 2024-10-06 DIAGNOSIS — I25.10 CORONARY ARTERY DISEASE INVOLVING NATIVE CORONARY ARTERY OF NATIVE HEART WITHOUT ANGINA PECTORIS: ICD-10-CM

## 2024-10-06 LAB
ALBUMIN SERPL-MCNC: 3.6 G/DL (ref 3.4–5)
ALBUMIN/GLOB SERPL: 0.9 {RATIO} (ref 1–2)
ALP LIVER SERPL-CCNC: 67 U/L
ALT SERPL-CCNC: 7 U/L
ANION GAP SERPL CALC-SCNC: 10 MMOL/L (ref 0–18)
ANTIBODY SCREEN: NEGATIVE
AST SERPL-CCNC: 27 U/L (ref 15–37)
BASOPHILS # BLD AUTO: 0.07 X10(3) UL (ref 0–0.2)
BASOPHILS NFR BLD AUTO: 0.9 %
BILIRUB SERPL-MCNC: 0.4 MG/DL (ref 0.1–2)
BUN BLD-MCNC: 67 MG/DL (ref 9–23)
CALCIUM BLD-MCNC: 7.7 MG/DL (ref 8.5–10.1)
CHLORIDE SERPL-SCNC: 102 MMOL/L (ref 98–112)
CO2 SERPL-SCNC: 25 MMOL/L (ref 21–32)
CREAT BLD-MCNC: 9.13 MG/DL
EGFRCR SERPLBLD CKD-EPI 2021: 7 ML/MIN/1.73M2 (ref 60–?)
EOSINOPHIL # BLD AUTO: 0.17 X10(3) UL (ref 0–0.7)
EOSINOPHIL NFR BLD AUTO: 2.3 %
ERYTHROCYTE [DISTWIDTH] IN BLOOD BY AUTOMATED COUNT: 14.6 %
GLOBULIN PLAS-MCNC: 3.8 G/DL (ref 2.8–4.4)
GLUCOSE BLD-MCNC: 207 MG/DL (ref 70–99)
HCT VFR BLD AUTO: 26.9 %
HGB BLD-MCNC: 9 G/DL
IMM GRANULOCYTES # BLD AUTO: 0.02 X10(3) UL (ref 0–1)
IMM GRANULOCYTES NFR BLD: 0.3 %
LYMPHOCYTES # BLD AUTO: 1.11 X10(3) UL (ref 1–4)
LYMPHOCYTES NFR BLD AUTO: 14.9 %
MCH RBC QN AUTO: 32.1 PG (ref 26–34)
MCHC RBC AUTO-ENTMCNC: 33.5 G/DL (ref 31–37)
MCV RBC AUTO: 96.1 FL
MONOCYTES # BLD AUTO: 1.04 X10(3) UL (ref 0.1–1)
MONOCYTES NFR BLD AUTO: 14 %
NEUTROPHILS # BLD AUTO: 5.02 X10 (3) UL (ref 1.5–7.7)
NEUTROPHILS # BLD AUTO: 5.02 X10(3) UL (ref 1.5–7.7)
NEUTROPHILS NFR BLD AUTO: 67.6 %
OSMOLALITY SERPL CALC.SUM OF ELEC: 309 MOSM/KG (ref 275–295)
PLATELET # BLD AUTO: 192 10(3)UL (ref 150–450)
POTASSIUM SERPL-SCNC: 3.7 MMOL/L (ref 3.5–5.1)
PROT SERPL-MCNC: 7.4 G/DL (ref 6.4–8.2)
RBC # BLD AUTO: 2.8 X10(6)UL
RH BLOOD TYPE: POSITIVE
SODIUM SERPL-SCNC: 137 MMOL/L (ref 136–145)
WBC # BLD AUTO: 7.4 X10(3) UL (ref 4–11)

## 2024-10-06 PROCEDURE — 99285 EMERGENCY DEPT VISIT HI MDM: CPT

## 2024-10-06 PROCEDURE — 80053 COMPREHEN METABOLIC PANEL: CPT | Performed by: EMERGENCY MEDICINE

## 2024-10-06 PROCEDURE — 96376 TX/PRO/DX INJ SAME DRUG ADON: CPT

## 2024-10-06 PROCEDURE — 96375 TX/PRO/DX INJ NEW DRUG ADDON: CPT

## 2024-10-06 PROCEDURE — 85025 COMPLETE CBC W/AUTO DIFF WBC: CPT | Performed by: EMERGENCY MEDICINE

## 2024-10-06 PROCEDURE — 86900 BLOOD TYPING SEROLOGIC ABO: CPT | Performed by: EMERGENCY MEDICINE

## 2024-10-06 PROCEDURE — 96361 HYDRATE IV INFUSION ADD-ON: CPT

## 2024-10-06 PROCEDURE — 74177 CT ABD & PELVIS W/CONTRAST: CPT | Performed by: EMERGENCY MEDICINE

## 2024-10-06 PROCEDURE — 96374 THER/PROPH/DIAG INJ IV PUSH: CPT

## 2024-10-06 PROCEDURE — 86850 RBC ANTIBODY SCREEN: CPT | Performed by: EMERGENCY MEDICINE

## 2024-10-06 PROCEDURE — 86901 BLOOD TYPING SEROLOGIC RH(D): CPT | Performed by: EMERGENCY MEDICINE

## 2024-10-06 RX ORDER — HYDROMORPHONE HYDROCHLORIDE 1 MG/ML
0.5 INJECTION, SOLUTION INTRAMUSCULAR; INTRAVENOUS; SUBCUTANEOUS ONCE
Status: DISCONTINUED | OUTPATIENT
Start: 2024-10-06 | End: 2024-10-06

## 2024-10-06 RX ORDER — HYDROMORPHONE HYDROCHLORIDE 1 MG/ML
0.5 INJECTION, SOLUTION INTRAMUSCULAR; INTRAVENOUS; SUBCUTANEOUS ONCE
Status: COMPLETED | OUTPATIENT
Start: 2024-10-06 | End: 2024-10-06

## 2024-10-06 RX ORDER — ONDANSETRON 2 MG/ML
4 INJECTION INTRAMUSCULAR; INTRAVENOUS ONCE
Status: COMPLETED | OUTPATIENT
Start: 2024-10-06 | End: 2024-10-06

## 2024-10-07 PROBLEM — I25.10 CORONARY ARTERY DISEASE INVOLVING NATIVE CORONARY ARTERY OF NATIVE HEART WITHOUT ANGINA PECTORIS: Status: ACTIVE | Noted: 2024-10-07

## 2024-10-07 PROBLEM — E11.22 TYPE 2 DIABETES MELLITUS WITH CHRONIC KIDNEY DISEASE ON CHRONIC DIALYSIS, WITHOUT LONG-TERM CURRENT USE OF INSULIN (HCC): Status: ACTIVE | Noted: 2024-10-07

## 2024-10-07 PROBLEM — N18.6 TYPE 2 DIABETES MELLITUS WITH CHRONIC KIDNEY DISEASE ON CHRONIC DIALYSIS, WITHOUT LONG-TERM CURRENT USE OF INSULIN (HCC): Status: ACTIVE | Noted: 2024-10-07

## 2024-10-07 PROBLEM — Z99.2 TYPE 2 DIABETES MELLITUS WITH CHRONIC KIDNEY DISEASE ON CHRONIC DIALYSIS, WITHOUT LONG-TERM CURRENT USE OF INSULIN (HCC): Status: ACTIVE | Noted: 2024-10-07

## 2024-10-07 LAB
ALBUMIN SERPL-MCNC: 4.3 G/DL (ref 3.2–4.8)
ANION GAP SERPL CALC-SCNC: 13 MMOL/L (ref 0–18)
BASOPHILS # BLD AUTO: 0.07 X10(3) UL (ref 0–0.2)
BASOPHILS NFR BLD AUTO: 1 %
BUN BLD-MCNC: 63 MG/DL (ref 9–23)
CALCIUM BLD-MCNC: 8.5 MG/DL (ref 8.7–10.4)
CHLORIDE SERPL-SCNC: 102 MMOL/L (ref 98–112)
CO2 SERPL-SCNC: 25 MMOL/L (ref 21–32)
CREAT BLD-MCNC: 8.35 MG/DL
EGFRCR SERPLBLD CKD-EPI 2021: 7 ML/MIN/1.73M2 (ref 60–?)
EOSINOPHIL # BLD AUTO: 0.23 X10(3) UL (ref 0–0.7)
EOSINOPHIL NFR BLD AUTO: 3.1 %
ERYTHROCYTE [DISTWIDTH] IN BLOOD BY AUTOMATED COUNT: 14.4 %
GLUCOSE BLD-MCNC: 95 MG/DL (ref 70–99)
HCT VFR BLD AUTO: 25.9 %
HGB BLD-MCNC: 8.5 G/DL
IMM GRANULOCYTES # BLD AUTO: 0.02 X10(3) UL (ref 0–1)
IMM GRANULOCYTES NFR BLD: 0.3 %
LYMPHOCYTES # BLD AUTO: 1.73 X10(3) UL (ref 1–4)
LYMPHOCYTES NFR BLD AUTO: 23.6 %
MAGNESIUM SERPL-MCNC: 2 MG/DL (ref 1.6–2.6)
MCH RBC QN AUTO: 31.4 PG (ref 26–34)
MCHC RBC AUTO-ENTMCNC: 32.8 G/DL (ref 31–37)
MCV RBC AUTO: 95.6 FL
MONOCYTES # BLD AUTO: 1.08 X10(3) UL (ref 0.1–1)
MONOCYTES NFR BLD AUTO: 14.7 %
NEUTROPHILS # BLD AUTO: 4.2 X10 (3) UL (ref 1.5–7.7)
NEUTROPHILS # BLD AUTO: 4.2 X10(3) UL (ref 1.5–7.7)
NEUTROPHILS NFR BLD AUTO: 57.3 %
OSMOLALITY SERPL CALC.SUM OF ELEC: 308 MOSM/KG (ref 275–295)
PHOSPHATE SERPL-MCNC: 6.6 MG/DL (ref 2.4–5.1)
PLATELET # BLD AUTO: 181 10(3)UL (ref 150–450)
POTASSIUM SERPL-SCNC: 3.8 MMOL/L (ref 3.5–5.1)
RBC # BLD AUTO: 2.71 X10(6)UL
SODIUM SERPL-SCNC: 140 MMOL/L (ref 136–145)
VANCOMYCIN SERPL-MCNC: 9.5 UG/ML (ref ?–40)
WBC # BLD AUTO: 7.3 X10(3) UL (ref 4–11)

## 2024-10-07 PROCEDURE — 83735 ASSAY OF MAGNESIUM: CPT | Performed by: INTERNAL MEDICINE

## 2024-10-07 PROCEDURE — 85025 COMPLETE CBC W/AUTO DIFF WBC: CPT | Performed by: INTERNAL MEDICINE

## 2024-10-07 PROCEDURE — 94640 AIRWAY INHALATION TREATMENT: CPT

## 2024-10-07 PROCEDURE — 90935 HEMODIALYSIS ONE EVALUATION: CPT | Performed by: INTERNAL MEDICINE

## 2024-10-07 PROCEDURE — 80069 RENAL FUNCTION PANEL: CPT | Performed by: INTERNAL MEDICINE

## 2024-10-07 PROCEDURE — 80202 ASSAY OF VANCOMYCIN: CPT | Performed by: INTERNAL MEDICINE

## 2024-10-07 RX ORDER — HYDROMORPHONE HYDROCHLORIDE 1 MG/ML
0.4 INJECTION, SOLUTION INTRAMUSCULAR; INTRAVENOUS; SUBCUTANEOUS EVERY 2 HOUR PRN
Status: DISCONTINUED | OUTPATIENT
Start: 2024-10-07 | End: 2024-10-08

## 2024-10-07 RX ORDER — HEPARIN SODIUM 1000 [USP'U]/ML
1.5 INJECTION, SOLUTION INTRAVENOUS; SUBCUTANEOUS
Status: COMPLETED | OUTPATIENT
Start: 2024-10-07 | End: 2024-10-07

## 2024-10-07 RX ORDER — ALBUTEROL SULFATE 90 UG/1
2 INHALANT RESPIRATORY (INHALATION) EVERY 6 HOURS PRN
Status: DISCONTINUED | OUTPATIENT
Start: 2024-10-07 | End: 2024-10-08

## 2024-10-07 RX ORDER — HYDRALAZINE HYDROCHLORIDE 20 MG/ML
10 INJECTION INTRAMUSCULAR; INTRAVENOUS EVERY 6 HOURS PRN
Status: DISCONTINUED | OUTPATIENT
Start: 2024-10-07 | End: 2024-10-08

## 2024-10-07 RX ORDER — CEFAZOLIN 2 G/1
2 INJECTION, POWDER, FOR SOLUTION INTRAVENOUS
Status: DISCONTINUED | OUTPATIENT
Start: 2024-10-07 | End: 2024-10-07 | Stop reason: DRUGHIGH

## 2024-10-07 RX ORDER — LEVOFLOXACIN 500 MG/1
500 TABLET, FILM COATED ORAL
Status: DISCONTINUED | OUTPATIENT
Start: 2024-10-07 | End: 2024-10-08

## 2024-10-07 RX ORDER — ALBUMIN (HUMAN) 12.5 G/50ML
25 SOLUTION INTRAVENOUS
Status: DISCONTINUED | OUTPATIENT
Start: 2024-10-07 | End: 2024-10-08

## 2024-10-07 RX ORDER — LOSARTAN POTASSIUM 100 MG/1
100 TABLET ORAL DAILY
Status: DISCONTINUED | OUTPATIENT
Start: 2024-10-07 | End: 2024-10-08

## 2024-10-07 RX ORDER — VANCOMYCIN/0.9 % SOD CHLORIDE 1 G/100 ML
1 PLASTIC BAG, INJECTION (ML) INTRAVENOUS
Status: DISCONTINUED | OUTPATIENT
Start: 2024-10-07 | End: 2024-10-07

## 2024-10-07 RX ORDER — HYDROCODONE BITARTRATE AND ACETAMINOPHEN 10; 325 MG/1; MG/1
1 TABLET ORAL EVERY 6 HOURS PRN
Status: DISCONTINUED | OUTPATIENT
Start: 2024-10-07 | End: 2024-10-08

## 2024-10-07 RX ORDER — CARVEDILOL 12.5 MG/1
25 TABLET ORAL 2 TIMES DAILY WITH MEALS
Status: DISCONTINUED | OUTPATIENT
Start: 2024-10-07 | End: 2024-10-08

## 2024-10-07 RX ORDER — ACETAMINOPHEN 325 MG/1
650 TABLET ORAL EVERY 6 HOURS PRN
Status: DISCONTINUED | OUTPATIENT
Start: 2024-10-07 | End: 2024-10-08

## 2024-10-07 RX ORDER — CLONIDINE HYDROCHLORIDE 0.1 MG/1
0.1 TABLET ORAL EVERY EVENING
Status: DISCONTINUED | OUTPATIENT
Start: 2024-10-07 | End: 2024-10-07

## 2024-10-07 RX ORDER — MEMANTINE HYDROCHLORIDE 5 MG/1
5 TABLET ORAL 2 TIMES DAILY
Status: DISCONTINUED | OUTPATIENT
Start: 2024-10-07 | End: 2024-10-08

## 2024-10-07 RX ORDER — ONDANSETRON 2 MG/ML
4 INJECTION INTRAMUSCULAR; INTRAVENOUS EVERY 6 HOURS PRN
Status: DISCONTINUED | OUTPATIENT
Start: 2024-10-07 | End: 2024-10-08

## 2024-10-07 RX ORDER — HYDRALAZINE HYDROCHLORIDE 50 MG/1
50 TABLET, FILM COATED ORAL 2 TIMES DAILY
Status: DISCONTINUED | OUTPATIENT
Start: 2024-10-07 | End: 2024-10-08

## 2024-10-07 RX ORDER — NIFEDIPINE 60 MG/1
60 TABLET, EXTENDED RELEASE ORAL 2 TIMES DAILY
Status: DISCONTINUED | OUTPATIENT
Start: 2024-10-07 | End: 2024-10-08

## 2024-10-07 RX ORDER — TAMSULOSIN HYDROCHLORIDE 0.4 MG/1
0.4 CAPSULE ORAL DAILY
Status: DISCONTINUED | OUTPATIENT
Start: 2024-10-07 | End: 2024-10-08

## 2024-10-07 RX ORDER — ISOSORBIDE MONONITRATE 30 MG/1
30 TABLET, EXTENDED RELEASE ORAL DAILY
Status: DISCONTINUED | OUTPATIENT
Start: 2024-10-07 | End: 2024-10-08

## 2024-10-07 RX ORDER — BUPROPION HYDROCHLORIDE 150 MG/1
150 TABLET ORAL DAILY
Status: DISCONTINUED | OUTPATIENT
Start: 2024-10-07 | End: 2024-10-08

## 2024-10-07 RX ORDER — HYDROMORPHONE HYDROCHLORIDE 1 MG/ML
0.2 INJECTION, SOLUTION INTRAMUSCULAR; INTRAVENOUS; SUBCUTANEOUS EVERY 4 HOURS PRN
Status: DISCONTINUED | OUTPATIENT
Start: 2024-10-07 | End: 2024-10-07

## 2024-10-07 RX ORDER — CLONIDINE HYDROCHLORIDE 0.1 MG/1
0.1 TABLET ORAL 2 TIMES DAILY
Status: DISCONTINUED | OUTPATIENT
Start: 2024-10-07 | End: 2024-10-08

## 2024-10-07 RX ORDER — HYDROMORPHONE HYDROCHLORIDE 1 MG/ML
0.8 INJECTION, SOLUTION INTRAMUSCULAR; INTRAVENOUS; SUBCUTANEOUS EVERY 2 HOUR PRN
Status: DISCONTINUED | OUTPATIENT
Start: 2024-10-07 | End: 2024-10-08

## 2024-10-07 RX ORDER — FENOFIBRATE 134 MG/1
134 CAPSULE ORAL NIGHTLY
Status: DISCONTINUED | OUTPATIENT
Start: 2024-10-07 | End: 2024-10-08

## 2024-10-07 RX ORDER — SEVELAMER CARBONATE 800 MG/1
800 TABLET, FILM COATED ORAL
Status: DISCONTINUED | OUTPATIENT
Start: 2024-10-07 | End: 2024-10-08

## 2024-10-07 RX ORDER — HYDROMORPHONE HYDROCHLORIDE 1 MG/ML
0.2 INJECTION, SOLUTION INTRAMUSCULAR; INTRAVENOUS; SUBCUTANEOUS EVERY 2 HOUR PRN
Status: DISCONTINUED | OUTPATIENT
Start: 2024-10-07 | End: 2024-10-08

## 2024-10-07 RX ORDER — DEXTROAMPHETAMINE SACCHARATE, AMPHETAMINE ASPARTATE, DEXTROAMPHETAMINE SULFATE AND AMPHETAMINE SULFATE 1.25; 1.25; 1.25; 1.25 MG/1; MG/1; MG/1; MG/1
15 TABLET ORAL
Status: DISCONTINUED | OUTPATIENT
Start: 2024-10-07 | End: 2024-10-08

## 2024-10-07 NOTE — CONSULTS
[facility] - Nephrology  Inpatient Consultation    Godwin Fonseca Patient Status:  Inpatient    1980 MRN [id number]   Location [facility] 7NE-A Attending Vicky Weiss, DO   Hosp Day # 0 PCP Adrian Small MD     Reason for consult: ESRD on HD  Date of consultation: 10/7/2024     HISTORY OF PRESENT ILLNESS:     Godwin Fonseca is a 44 year old male with a medical history notable for ESRD on HD MWF who presents for evaluation of bleeding. Chronic issue, has been worked up in the past and is scheduled for follow-up outpatient for capsule endoscopy. GI consulted for further management of bleeding. Nephrology consulted for HD management.     REVIEW OF SYSTEMS:     ROS as above, otherwise negative    HISTORY:     Past Medical History:    Asthma (HCC)    Attention deficit hyperactivity disorder (ADHD)    Back problem    Bipolar 1 disorder (HCC)    CKD (chronic kidney disease) stage 3, GFR 30-59 ml/min (HCC)    Dr Meeks    Congestive heart disease (HCC)    COPD (chronic obstructive pulmonary disease) (HCC)    Coronary atherosclerosis    Deep vein thrombosis (HCC)    at age 17 R/T cast    Depression    Diabetes (HCC)    Essential hypertension    3/21 echo: severe concentric LVH with normal EF and no MR or pHTN    Extrinsic asthma, unspecified    Heart attack (HCC)    - angiogram- no intervention    Heart valve disease    mitral valve repair in /    High blood pressure    High cholesterol    History of blood transfusion    History of mitral valve repair    Hyperlipidemia    Low back pain    tight and stiff after sweeping and mopping    LVH (left ventricular hypertrophy)    Migraines    Mixed hyperlipidemia     HDL 38 LDL 97 VLDL 57     Monoclonal gammopathy    IgG kappa     MVP (mitral valve prolapse)    Repair  at [place]; echoes as recently as 3/21 show mild or trivial MR and no stenosis    Neuropathy    Osteoarthritis    hip ,knees    Pneumonia due to organism    Pulmonary  embolism (HCC)    Renal disorder    Stroke (HCC)    TIA (transient ischemic attack)    Initial history of left-sided weakness and slurred speech. (+) cocaine. MRI of the brain, CT angiogram of the head and neck, and 2D echo are all unremarkable.     TMJ (dislocation of temporomandibular joint)    Troponin level elevated    Trop 60 60 47 with TIA and no CP: Lexiscan negative with EF 51     Past Surgical History:   Procedure Laterality Date    Av fistula revision, open Left     Colonoscopy N/A 03/26/2023    Procedure: COLONOSCOPY;  Surgeon: Heath Vu MD;  Location:  ENDOSCOPY    Colonoscopy N/A 12/30/2023    Procedure: COLONOSCOPY with cold snare polypectomy and forcep polypectomy;  Surgeon: Ousmane Suarez MD;  Location:  ENDOSCOPY    Colonoscopy & polypectomy  2019    Egd  2019    Duodenitis. Biopsied. EUS for weight loss was negative    Heart surgery      Hernia surgery  08/17/2022    Dr Barnes    Laminectomy,>2 sgmt,lumbar  09/06/2018    L4-L5 Decomp Discectomy ROEM L4-L5    Mitralplasty w cp bypass  1994    [place]: Repair    Repair rotator cuff,chronic Left     torn and had a ruptured bicep    Valve repair  1994    mitral valve     Family History   Problem Relation Age of Onset    Hypertension Father     Alcohol and Other Disorders Associated Father     Substance Abuse Father         cocaine    Dementia Father     Cancer Father         lung    Diabetes Mother     Cancer Mother         multiple myeloma    Hypertension Mother     Anxiety Maternal Aunt     Depression Maternal Aunt     Anxiety Maternal Aunt     Depression Maternal Aunt     Bipolar Disorder Maternal Aunt     Diabetes Maternal Grandmother     Hypertension Maternal Grandmother     Cancer Maternal Grandfather         stomach cancer    Diabetes Maternal Grandfather     Hypertension Maternal Grandfather     Alcohol and Other Disorders Associated Maternal Grandfather     Hypertension Paternal Grandmother     Hypertension Paternal Grandfather      Cancer Sister         uterine and ovarian    Hypertension Sister     Cancer Maternal Uncle         lung    Cancer Paternal Aunt         throat      reports that he quit smoking about 2 years ago. His smoking use included cigarettes. He started smoking about 29 years ago. He has a 27 pack-year smoking history. He has never been exposed to tobacco smoke. He has never used smokeless tobacco. He reports that he does not drink alcohol and does not use drugs.  Allergies   Allergen Reactions    Hydrochlorothiazide RASH and HIVES       MEDICATIONS:       Current Facility-Administered Medications:     albuterol (Ventolin HFA) 108 (90 Base) MCG/ACT inhaler 2 puff, 2 puff, Inhalation, Q6H PRN    ARIPiprazole (Abilify) tab 15 mg, 15 mg, Oral, Daily    buPROPion ER (Wellbutrin XL) 24 hr tab 150 mg, 150 mg, Oral, Daily    carvedilol (Coreg) tab 25 mg, 25 mg, Oral, BID with meals    cloNIDine (Catapres) tab 0.1 mg, 0.1 mg, Oral, QPM    fenofibrate micronized (Lofibra) cap 134 mg, 134 mg, Oral, Nightly    FLUoxetine (PROzac) cap 40 mg, 40 mg, Oral, Daily    hydrALAZINE (Apresoline) tab 50 mg, 50 mg, Oral, BID    HYDROcodone-acetaminophen (Norco)  MG per tab 1 tablet, 1 tablet, Oral, Q6H PRN    isosorbide mononitrate ER (Imdur) 24 hr tab 30 mg, 30 mg, Oral, Daily    lamoTRIgine (LaMICtal) tab 150 mg, 150 mg, Oral, Daily    levoFLOXacin (Levaquin) tab 500 mg, 500 mg, Oral, Q48HR @ 0700    amphetamine-dextroamphetamine (Adderall) tab 15 mg, 15 mg, Oral, BID@0800,1200    losartan (Cozaar) tab 100 mg, 100 mg, Oral, Daily    memantine (Namenda) tab 5 mg, 5 mg, Oral, BID    NIFEdipine ER (Procardia-XL) 24 hr tab 60 mg, 60 mg, Oral, BID    sevelamer carbonate (Renvela) tab 800 mg, 800 mg, Oral, TID CC    tamsulosin (Flomax) cap 0.4 mg, 0.4 mg, Oral, Daily    umeclidinium bromide (Incruse Ellipta) 62.5 MCG/ACT inhaler 1 puff, 1 puff, Inhalation, Daily    acetaminophen (Tylenol) tab 650 mg, 650 mg, Oral, Q6H PRN    ondansetron (Zofran)  4 MG/2ML injection 4 mg, 4 mg, Intravenous, Q6H PRN    pantoprazole (Protonix) 40 mg in sodium chloride 0.9% PF 10 mL IV push, 40 mg, Intravenous, Q12H    sodium chloride 0.9 % IV bolus 100 mL, 100 mL, Intravenous, Q30 Min PRN **AND** albumin human (Albumin) 25% injection 25 g, 25 g, Intravenous, PRN Dialysis    hydrALAzine (Apresoline) 20 mg/mL injection 10 mg, 10 mg, Intravenous, Q6H PRN    Vancomycin: PHARMACY DOSING, 1 each, Intravenous, See Admin Instructions (RX holding)    ceFAZolin (Ancef) 1 g in dextrose 5% 100mL IVPB-ADD, 1 g, Intravenous, Q24H    HYDROmorphone (Dilaudid) 1 MG/ML injection 0.2 mg, 0.2 mg, Intravenous, Q2H PRN **OR** HYDROmorphone (Dilaudid) 1 MG/ML injection 0.4 mg, 0.4 mg, Intravenous, Q2H PRN **OR** HYDROmorphone (Dilaudid) 1 MG/ML injection 0.8 mg, 0.8 mg, Intravenous, Q2H PRN    vancomycin (Vancocin) 1,000 mg in sodium chloride 0.9% 250 mL IVPB-ADDV, 10 mg/kg, Intravenous, Once    Vancomycin: PHARMACY DOSING, 1 each, Intravenous, See Admin Instructions (RX holding)    Medications Prior to Admission   Medication Sig Dispense Refill Last Dose    memantine 5 MG Oral Tab Take 1 tablet (5 mg total) by mouth 2 (two) times daily.   10/6/2024    Lisdexamfetamine Dimesylate 70 MG Oral Cap Take 1 capsule (70 mg total) by mouth every morning.   10/6/2024    Vancomycin HCl in NaCl 1-0.9 GM/250ML-% Intravenous Solution Inject 250 mL (1 g total) into the vein Every Monday, Wednesday, and Friday for 14 days. Pharmacy to dose, trough goals 15-20. 6 each 0 10/4/2024    ceFAZolin in sodium chloride 0.9% 3 g/100mL Intravenous Solution Inject 100 mL (3 g total) into the vein Every Friday for 2 doses. 200 mL 0 10/6/2024    ceFAZolin Sodium 2 g Intravenous Recon Soln Inject 2 g into the vein Every Monday for 2 doses. 4 g 0 Past Week    ceFAZolin Sodium 2 g Intravenous Recon Soln Inject 2 g into the vein Every Wednesday for 2 doses. 4 g 0 Past Week    HYDROcodone-acetaminophen (NORCO)  MG Oral Tab Take  1 tablet by mouth every 6 (six) hours as needed for Pain. 20 tablet 0 Past Month    cloNIDine 0.1 MG Oral Tab Take 1 tablet (0.1 mg total) by mouth 2 (two) times daily. (Patient taking differently: Take 1 tablet (0.1 mg total) by mouth every evening.) 60 tablet 0 10/6/2024    levoFLOXacin 250 MG Oral Tab TAKE 2 TABLETs BY MOUTH one time then take 1 tablet every 48 hours for 30 days   Past Week    albuterol 108 (90 Base) MCG/ACT Inhalation Aero Soln Inhale 2 puffs into the lungs every 6 (six) hours as needed for Wheezing.   10/6/2024    tiotropium 18 MCG Inhalation Cap Inhale 1 capsule (18 mcg total) into the lungs daily.   Past Week    tamsulosin 0.4 MG Oral Cap Take 1 capsule (0.4 mg total) by mouth daily.   10/6/2024    ARIPiprazole 15 MG Oral Tab Take 1 tablet (15 mg total) by mouth daily.   10/6/2024    isosorbide mononitrate ER 30 MG Oral Tablet 24 Hr Take 1 tablet (30 mg total) by mouth daily. Hold if systolic blood pressure <130   10/6/2024    buPROPion  MG Oral Tablet 24 Hr Take 1 tablet (150 mg total) by mouth daily.   10/6/2024    lamoTRIgine (LAMICTAL) 150 MG Oral Tab Take 1 tablet (150 mg total) by mouth daily. 7 tablet 0 10/6/2024    FLUoxetine HCl 40 MG Oral Cap Take 1 capsule (40 mg total) by mouth daily. 7 capsule 0 10/6/2024    RENVELA 800 MG Oral Tab Take 1 tablet (800 mg total) by mouth 3 (three) times daily with meals.   10/6/2024    losartan 100 MG Oral Tab Take 1 tablet (100 mg total) by mouth daily. Hold if systolic blood pressure <130   10/6/2024    hydrALAZINE 50 MG Oral Tab Take 1 tablet (50 mg total) by mouth in the morning and 1 tablet (50 mg total) before bedtime. Hold if systolic blood pressure <130. 30 tablet 0 10/6/2024    NIFEdipine ER 60 MG Oral Tablet 24 Hr Take 1 tablet (60 mg total) by mouth in the morning and 1 tablet (60 mg total) before bedtime. Hold if systolic blood pressure <130.   10/6/2024    carvedilol 25 MG Oral Tab Take 1 tablet (25 mg total) by mouth in the  morning and 1 tablet (25 mg total) in the evening. Take with meals. 60 tablet 6 10/6/2024    Fenofibrate 134 MG Oral Cap Take 1 capsule by mouth nightly. 90 capsule 1 10/6/2024    Fluticasone Propionate 50 MCG/ACT Nasal Suspension SPRAY ONCE INTO EACH NOSTRIL BID PRN 15.8 mL 0 10/6/2024    [] ondansetron 4 MG Oral Tablet Dispersible Take 1 tablet (4 mg total) by mouth every 4 (four) hours as needed for Nausea. 10 tablet 0         PHYSICAL EXAM:     Vital Signs: BP (!) 167/109 (BP Location: Right arm)   Pulse 89   Temp 97.6 °F (36.4 °C) (Oral)   Resp 15   Ht 6' 2\" (1.88 m)   Wt 240 lb (108.9 kg)   SpO2 99%   BMI 30.81 kg/m²   Temp (24hrs), Av.8 °F (36.6 °C), Min:97.6 °F (36.4 °C), Max:98.1 °F (36.7 °C)       Intake/Output Summary (Last 24 hours) at 10/7/2024 1656  Last data filed at 10/7/2024 1542  Gross per 24 hour   Intake 2080 ml   Output 3100 ml   Net -1020 ml     Wt Readings from Last 3 Encounters:   10/07/24 240 lb (108.9 kg)   24 245 lb (111.1 kg)   24 245 lb (111.1 kg)       General: no acute distress  HEENT: normocephalic, atraumatic  CV: RRR  Respiratory: no respiratory distress  Abdomen: soft, non-tender  Extremities: no edema bilaterally  Skin: warm, dry  Neuro: awake, alert    LABORATORY DATA:     Lab Results   Component Value Date    GLU 95 10/07/2024    BUN 63 (H) 10/07/2024    BUNCREA 13.0 2022    CREATSERUM 8.35 (H) 10/07/2024    ANIONGAP 13 10/07/2024    GFR 59 (L) 2018    GFRNAA 30 (L) 2022    GFRAA 35 (L) 2022    CA 8.5 (L) 10/07/2024    OSMOCALC 308 (H) 10/07/2024    ALKPHO 67 10/06/2024    AST 27 10/06/2024    ALT 7 (L) 10/06/2024    BILT 0.4 10/06/2024    TP 7.4 10/06/2024    ALB 4.3 10/07/2024    GLOBULIN 3.8 10/06/2024     10/07/2024    K 3.8 10/07/2024     10/07/2024    CO2 25.0 10/07/2024     Lab Results   Component Value Date    WBC 7.3 10/07/2024    RBC 2.71 (L) 10/07/2024    HGB 8.5 (L) 10/07/2024    HCT 25.9 (L)  10/07/2024    .0 10/07/2024    MPV 11.5 12/14/2012    MCV 95.6 10/07/2024    MCH 31.4 10/07/2024    MCHC 32.8 10/07/2024    RDW 14.4 10/07/2024    NEPRELIM 4.20 10/07/2024    NEPERCENT 57.3 10/07/2024    LYPERCENT 23.6 10/07/2024    MOPERCENT 14.7 10/07/2024    EOPERCENT 3.1 10/07/2024    BAPERCENT 1.0 10/07/2024    NE 4.20 10/07/2024    LYMABS 1.73 10/07/2024    MOABSO 1.08 (H) 10/07/2024    EOABSO 0.23 10/07/2024    BAABSO 0.07 10/07/2024     Lab Results   Component Value Date    MALBP 167.00 05/24/2023    CREUR 142.00 05/24/2023    CREUR 142.00 05/24/2023     Lab Results   Component Value Date    COLORUR Colorless (A) 09/16/2024    CLARITY Clear 09/16/2024    SPECGRAVITY 1.012 09/16/2024    GLUUR Normal 09/16/2024    BILUR Negative 09/16/2024    KETUR Negative 09/16/2024    BLOODURINE 3+ (A) 09/16/2024    PHURINE 6.0 09/16/2024    PROUR 50 (A) 09/16/2024    UROBILINOGEN Normal 09/16/2024    NITRITE Negative 09/16/2024    LEUUR Negative 09/16/2024    WBCUR 1-5 09/16/2024    RBCUR 0-2 09/16/2024    EPIUR Few (A) 09/16/2024    BACUR None Seen 09/16/2024    CAOXUR Occasional (A) 04/20/2018    HYLUR Present (A) 05/14/2019       IMAGING:     Reviewed    ASSESSMENT:     # ESRD on HD  -MWF schedule outpatient  -[facility]  -L AVF (not mature)  -R TDC (placed 9/20/2024)     # HTN/Volume Status     # Anemia     # Secondary Hyperparathyroidism    PLAN:     -HD per MWF schedule   -UF with HD as tolerated  -Continue home BP medications - change clonidine to 0.1mg BID to avoid rebound  -Defer OWEN in setting of high BPs  -Continue sevelamer  -GI recommendations for bleeding.   -Continue antibiotics for catheter associated infection per ID  -Avoid nephrotoxins and renally dose medications for creatinine clearance  -Monitor intake and output daily  -Daily weights            We will continue to follow.    Thank you for allowing us to participate in the care of this patient.         Samantha Muñoz, [facility]  [facility] - Nephrology

## 2024-10-07 NOTE — PROGRESS NOTES
[facility] Pharmacy Dosing Service      Initial Pharmacokinetic Consult for Vancomycin Dosing     Godwin Fonseca is a 44 year old male who is on vancomycin therapy for  infected hemodialysis catheter .  Pt has been on Vancomycin prior to admission and it is being continued. Pharmacy has been asked to dose vancomycin by Dr. Little.  The treatment and monitoring approach will be non-AUC strategy.        Weight and Temperature:    Wt Readings from Last 1 Encounters:   10/06/24 108.9 kg (240 lb)        Temp Readings from Last 1 Encounters:   10/07/24 97.7 °F (36.5 °C) (Oral)      Labs:   Recent Labs   Lab 09/30/24  0813 10/06/24  2036   CREATSERUM 8.28* 9.13*      Estimated Creatinine Clearance: 11.8 mL/min (A) (based on SCr of 9.13 mg/dL (H)).     Recent Labs   Lab 09/30/24  0813 10/06/24  2037   WBC 5.6 7.4          The Pharmacokinetic Target is:      Trough/random 15-20 mg/L (applies to IV dosing in HD and IP dosing in APD)    Renal Dosing Considerations:      HD: Home regimen: Monday, Wednesday, Friday      Assessment/Plan:       Maintenance dose: Pharmacy will dose vancomycin per levels.    Monitorin) Plan for vancomycin random level to be obtained today, 10/7/24 with am labs.    2) Pharmacy will monitor for toxicity and efficacy, adjust vancomycin dose and/or frequency, and order vancomycin levels as appropriate per the Pharmacy and Therapeutics Committee approved protocol until discontinuation of the medication.       We appreciate the opportunity to assist in the care of this patient.     Vanessa Carpenter PharmD  10/7/2024  4:31 AM  [facility]  Pharmacy Extension: [phone]

## 2024-10-07 NOTE — PROGRESS NOTES
[facility] Pharmacy Dosing Service      Follow Up Pharmacokinetic Consult for Vancomycin Dosing     Godwin Fonseca is a 44 year old male who is receiving vancomycin therapy for  infected HD catheter . Patient was started on vancomycin 10/1/2024 by Kevin GARCIA and is currently receiving 1000 mg IV  Mon, Wed, Fri after hemodialysis . The current treatment and monitoring approach is non-AUC strategy.        Weight and Temperature:    Wt Readings from Last 1 Encounters:   10/07/24 108.9 kg (240 lb)         Temp Readings from Last 1 Encounters:   10/07/24 97.9 °F (36.6 °C) (Oral)      Labs:   Recent Labs   Lab 10/06/24  2036 10/07/24  0537   CREATSERUM 9.13* 8.35*      Estimated Creatinine Clearance: 12.9 mL/min (A) (based on SCr of 8.35 mg/dL (H)).     Recent Labs   Lab 10/06/24  2037 10/07/24  0537   WBC 7.4 7.3        Vancomycin Levels:  Lab Results   Component Value Date/Time    VANCR 9.5 10/07/2024 05:37 AM       The Pharmacokinetic Target is:    Trough/random 15-20 mg/L (applies to IV dosing in HD and IP dosing in APD)    Renal Dosing Considerations:    HD: Home regimen: Mon, Wed, Fri     Assessment/Plan:   Maintenance Regimen:  Give Vanco 1gm IV x1 dose today after HD    Monitorin) Plan for vancomycin random level to be obtained in approximately 48 hours    2) Pharmacy will order SCr as clinically indicated to assess renal function.    3) Pharmacy will monitor for toxicity and efficacy, adjust vancomycin dose and/or frequency, and order vancomycin levels as appropriate per the Pharmacy and Therapeutics Committee approved protocol until discontinuation of the medication.       We appreciate the opportunity to assist in the care of this patient.     Keiry Flores, PharmD  10/7/2024  9:22 AM  [facility]  Pharmacy Extension: [phone]

## 2024-10-07 NOTE — ED INITIAL ASSESSMENT (HPI)
Pt c/o blood in stool. Also states vomiting all day. Denies blood in vomit. C/O of feeling bloated And having abd pain. Pt states feel over loaded.

## 2024-10-07 NOTE — ED PROVIDER NOTES
Patient Seen in: [facility] [place]      History     Chief Complaint   Patient presents with    GI Bleeding    Difficulty Breathing     Stated Complaint: Rectal bleeding, sob    Subjective:   HPI      At [facility] last month with diverticular bleedingan dneeded 3 units of blood/ may be 4- rectal bleeding started again yesterday and left sided abdominal pain started today  Not on blood thnners  Hospitalized beginning of this month for infected dialysis catheter and was hospitalized - still on antibotics  Dialysis as outpatiet with antbiotics as well   Lightheaded, no chest pain  Diarrhea since hospitalized on the antibiotics      Objective:     Past Medical History:    Asthma ([facility])    Attention deficit hyperactivity disorder (ADHD)    Back problem    Bipolar 1 disorder ([facility])    CKD (chronic kidney disease) stage 3, GFR 30-59 ml/min ([facility])    Dr Meeks    Congestive heart disease ([facility])    COPD (chronic obstructive pulmonary disease) ([facility])    Coronary atherosclerosis    Deep vein thrombosis ([facility])    at age 17 R/T cast    Depression    Diabetes ([facility])    Essential hypertension    3/21 echo: severe concentric LVH with normal EF and no MR or pHTN    Extrinsic asthma, unspecified    Heart attack ([facility])    2016- angiogram- no intervention    Heart valve disease    mitral valve repair in 1994/    High blood pressure    High cholesterol    History of blood transfusion    History of mitral valve repair    Hyperlipidemia    Low back pain    tight and stiff after sweeping and mopping    LVH (left ventricular hypertrophy)    Migraines    Mixed hyperlipidemia     HDL 38 LDL 97 VLDL 57     Monoclonal gammopathy    IgG kappa     MVP (mitral valve prolapse)    Repair 1994 at [place]; echoes as recently as 3/21 show mild or trivial MR and no stenosis    Neuropathy    Osteoarthritis    hip ,knees    Pneumonia due to organism    Pulmonary embolism ([facility])    Renal disorder    Stroke ([facility])    TIA (transient  ischemic attack)    Initial history of left-sided weakness and slurred speech. (+) cocaine. MRI of the brain, CT angiogram of the head and neck, and 2D echo are all unremarkable.     TMJ (dislocation of temporomandibular joint)    Troponin level elevated    Trop 60 60 47 with TIA and no CP: Lexiscan negative with EF 51              Past Surgical History:   Procedure Laterality Date    Av fistula revision, open Left     Colonoscopy N/A 2023    Procedure: COLONOSCOPY;  Surgeon: Heath Vu MD;  Location:  ENDOSCOPY    Colonoscopy N/A 2023    Procedure: COLONOSCOPY with cold snare polypectomy and forcep polypectomy;  Surgeon: Ousmane Suarez MD;  Location:  ENDOSCOPY    Colonoscopy & polypectomy  2019    Egd  2019    Duodenitis. Biopsied. EUS for weight loss was negative    Heart surgery      Hernia surgery  2022    Dr Barnes    Laminectomy,>2 sgmt,lumbar  2018    L4-L5 Decomp Discectomy ROEM L4-L5    Mitralplasty w cp bypass      [place]: Repair    Repair rotator cuff,chronic Left     torn and had a ruptured bicep    Valve repair      mitral valve                Social History     Socioeconomic History    Marital status:    Tobacco Use    Smoking status: Former     Current packs/day: 0.00     Average packs/day: 1 pack/day for 27.0 years (27.0 ttl pk-yrs)     Types: Cigarettes     Start date: 1995     Quit date: 2022     Years since quittin.6     Passive exposure: Never    Smokeless tobacco: Never   Vaping Use    Vaping status: Never Used   Substance and Sexual Activity    Alcohol use: No    Drug use: No     Social Determinants of Health     Financial Resource Strain: Medium Risk (2024)    Received from [facility]    Overall Financial Resource Strain (CARDIA)     Difficulty of Paying Living Expenses: Somewhat hard   Food Insecurity: No Food Insecurity (2024)    Food Insecurity     Food Insecurity: Never true   Transportation Needs: No  Transportation Needs (9/29/2024)    Transportation Needs     Lack of Transportation: No   Physical Activity: Inactive (5/7/2024)    Received from [facility]    Exercise Vital Sign     Days of Exercise per Week: 0 days     Minutes of Exercise per Session: 0 min   Stress: Stress Concern Present (5/7/2024)    Received from [facility]    [employer] [place] [employer] - Occupational Stress Questionnaire     Feeling of Stress : Rather much   Social Connections: Unknown (5/7/2024)    Received from [facility]    Social Connection and Isolation Panel [NHANES]     Frequency of Communication with Friends and Family: More than three times a week     Frequency of Social Gatherings with Friends and Family: More than three times a week     Attends Mandaeism Services: Never     Active Member of Clubs or Organizations: No     Attends Club or Organization Meetings: Never     Marital Status: Patient unable to answer   Housing Stability: Low Risk  (9/29/2024)    Housing Stability     Housing Instability: No                  Physical Exam     ED Triage Vitals [10/06/24 2006]   BP (!) 177/95   Pulse 101   Resp 22   Temp 98.1 °F (36.7 °C)   Temp src    SpO2 97 %   O2 Device None (Room air)       Current Vitals:   Vital Signs  BP: (!) 200/127  Pulse: 97  Resp: 21  Temp: 98.1 °F (36.7 °C)    Oxygen Therapy  SpO2: 99 %  O2 Device: None (Room air)        Physical Exam  Vitals and nursing note reviewed.   Constitutional:       General: He is not in acute distress.     Appearance: He is well-developed. He is obese. He is not toxic-appearing or diaphoretic.      Comments: Rubbing his left side of his abdomen intermittently moaning stating that he has severe pain in his abdomen.   HENT:      Head: Atraumatic.      Right Ear: External ear normal.      Left Ear: External ear normal.      Nose: Nose normal.   Eyes:      General: No scleral icterus.        Right eye: No discharge.         Left eye: No  discharge.      Conjunctiva/sclera: Conjunctivae normal.      Pupils: Pupils are equal, round, and reactive to light.   Neck:      Vascular: No JVD.   Cardiovascular:      Rate and Rhythm: Normal rate and regular rhythm.      Heart sounds: Normal heart sounds. No murmur heard.     No friction rub. No gallop.   Pulmonary:      Effort: Pulmonary effort is normal. No respiratory distress.      Breath sounds: Normal breath sounds. No stridor. No wheezing.   Chest:      Chest wall: No tenderness.   Abdominal:      General: Bowel sounds are normal. There is no distension.      Palpations: Abdomen is soft. There is no mass.      Tenderness: There is abdominal tenderness. There is no guarding or rebound.      Comments: Left abdomen   Genitourinary:     Rectum: Guaiac result negative.      Comments: Possibly just trace positive but no BRB noted at all on glove ad no stool in the vault  Musculoskeletal:         General: No tenderness or deformity. Normal range of motion.      Cervical back: Normal range of motion and neck supple.   Lymphadenopathy:      Cervical: No cervical adenopathy.   Skin:     General: Skin is warm and dry.      Coloration: Skin is not pale.      Findings: No erythema or rash.   Neurological:      General: No focal deficit present.      Mental Status: He is alert and oriented to person, place, and time.      Cranial Nerves: No cranial nerve deficit.      Coordination: Coordination normal.   Psychiatric:         Behavior: Behavior normal.         Judgment: Judgment normal.             ED Course     Labs Reviewed   CBC WITH DIFFERENTIAL WITH PLATELET - Abnormal; Notable for the following components:       Result Value    RBC 2.80 (*)     HGB 9.0 (*)     HCT 26.9 (*)     Monocyte Absolute 1.04 (*)     All other components within normal limits   COMP METABOLIC PANEL (14) - Abnormal; Notable for the following components:    Glucose 207 (*)     BUN 67 (*)     Creatinine 9.13 (*)     Calcium, Total 7.7 (*)      Calculated Osmolality 309 (*)     eGFR-Cr 7 (*)     ALT 7 (*)     A/G Ratio 0.9 (*)     All other components within normal limits   TYPE AND SCREEN    Narrative:     The following orders were created for panel order Type and screen.  Procedure                               Abnormality         Status                     ---------                               -----------         ------                     ABORH (Blood Type)[[id number]]                               Final result               Antibody Screen[[id number]]                                  Final result                 Please view results for these tests on the individual orders.   ABORH (BLOOD TYPE)   ANTIBODY SCREEN   RAINBOW DRAW BLUE   C. DIFFICILE(TOXIGENIC)PCR   OCCULT BLOOD, STOOL   STOOL CULTURE W/SHIGATOXIN       ED Course as of 10/07/24 0012  ------------------------------------------------------------  Time: 10/06 2053  Value: Hemoglobin(!): 9.0  Comment: (Reviewed)  ------------------------------------------------------------  Time: 10/06 2102  Comment: Capsule study is not until peggy- says a GI doctor told him his colon had pockets all over his colon- Like an 77 yo Man       CT ABDOMEN+PELVIS(CONTRAST ONLY)(CPT=74177)   Final Result   PROCEDURE:  CT ABDOMEN+PELVIS (CONTRAST ONLY) (CPT=74177)       COMPARISON:  [place], CT, CT ABDOMEN+PELVIS(CPT=74176), 9/15/2024, 9:10    PM.  [place], CT, CT ABDOMEN+PELVIS(CPT=74176), 8/30/2024, 9:57 AM.     [place], CT, CT ABDOMEN+PELVIS(CONTRAST ONLY)(CPT=74177), 6/25/2024,    7:21 AM.  [place], CT, CT    ABDOMEN+PELVIS KIDNEYSTONE 2D RNDR(NO IV,NO ORAL)(CPT=74176), 6/23/2024,    9:21 AM.       INDICATIONS:  Rectal bleeding, sob       TECHNIQUE:  CT scanning was performed from the dome of the diaphragm to    the pubic symphysis with non-ionic intravenous contrast material. Post    contrast coronal MPR imaging was performed.  Dose reduction techniques    were used. Dose information is     transmitted to the ACR (American College of Radiology) NRDR (National    Radiology Data Registry) which includes the Dose Index Registry.       PATIENT STATED HISTORY:(As transcribed by Technologist)  Rectal bleeding,    sob        CONTRAST USED:  100cc of Isovue 370       FINDINGS:     LIVER:  No enlargement, atrophy, abnormal density, or significant focal    lesion.     BILIARY:  No visible dilatation or calcification.     PANCREAS:  No lesion, fluid collection, ductal dilatation, or atrophy.     SPLEEN:  No enlargement or focal lesion.     KIDNEYS:  No mass, obstruction, or calcification.  Tiny cyst in the right    and left midpole of the kidney measures 4 mm.   ADRENALS:  No mass or enlargement.     AORTA/VASCULAR:  No aneurysm or dissection.     RETROPERITONEUM:  No mass or adenopathy.     BOWEL/MESENTERY:  No visible mass, obstruction, or bowel wall thickening.     There are scattered diverticula throughout the colon without evidence of    acute diverticulitis.  The appendix is normal.  Visualized small bowel is    unremarkable.   ABDOMINAL WALL:  No mass or hernia.     URINARY BLADDER:  There is diffuse mild wall thickening of the urinary    bladder which can be seen with infectious cystitis.   PELVIC NODES:  No adenopathy.     PELVIC ORGANS:  No visible mass.  Pelvic organs appropriate for patient    age.     BONES:  No bony lesion or fracture.  Moderate to severe degenerative disc    disease at L4-5 and L5-S1.   LUNG BASES:  Decreased size of right pleural effusion with subsegmental    atelectasis at the right lung base.     OTHER:  Negative.                           =====   CONCLUSION:         1. Diverticulosis of the colon without ends of diverticulitis.         2. Bladder wall thickening can be seen with cystitis.             LOCATION:  [facility]           Dictated by (CST): Jaycob Cassidy MD on 10/06/2024 at 10:14 PM        Finalized by (CST): Jaycob Cassidy MD on 10/06/2024 at 10:19 PM                   MDM        Records from previous encounters were reviewed from : Hospital patient most recently admitted and treated for infected dialysis catheter but patient has presented multiple times in the past with rectal bleeding and It was noted that patient has not yet had capsule study or colonoscopy.  He has been noncompliant with medications.  Differential diagnosis included : Diverticulosis, hemorrhoids, chronic abdominal pain, patient with chronic anemia at baseline with a hemoglobin of 9.0 right now it was 8.6 recently.  I cannot see the records from [facility] from late August into September when patient reportedly had 4 units of packed RBCs infused.  Significant history that contributed to medical decision making :  history of rectal bleeding chronic anemia feeling lightheaded  Management of patient was discussed with : Dr. Freedman and patient    Medications considered but not administered : Blood products, PPI drip. Medication was not administered because patient is actually hypertensive he is not hemodynamically unstable while he was tachycardic on arrival he was never hypotensive..    Hospitalization was considered : Patient will be hospitalized given his past medical history with end-stage renal disease malignant hypertension rectal bleeding chronic anemia because he is tachycardic and lightheaded and reporting large-volume bright red blood per rectum dripping into the toilet he also reports that he has had diverticular bleeding in the past and has needed 4 units of blood transfused and nephrology will consult to discuss the case with nephrology and the hospitalist gastroenterology consulted for tomorrow patient will just simply be monitored for any rectal bleeding with serial hemoglobins  Admission disposition: 10/7/2024 12:00 AM           Medical Decision Making      Disposition and Plan     Clinical Impression:  1. Diverticulosis of colon with hemorrhage    2. ESRD (end stage renal disease) (HCC)    3.  Type 2 diabetes mellitus with chronic kidney disease on chronic dialysis, without long-term current use of insulin (HCC)    4. Coronary artery disease involving native coronary artery of native heart without angina pectoris         Disposition:  Admit  10/7/2024 12:00 am    Follow-up:  No follow-up provider specified.        Medications Prescribed:  Current Discharge Medication List              Supplementary Documentation:         Hospital Problems       Present on Admission  Date Reviewed: 8/8/2024            ICD-10-CM Noted POA    * (Principal) Diverticulosis of colon with hemorrhage K57.31 10/6/2024 Unknown

## 2024-10-07 NOTE — CONSULTS
[facility] IP Report of Consultation Gastroenterology    10/7/2024    Godwin Fonseca  male   Elliott Shane DO     [id number]  4/12/1980 Primary Care Physician  Adrian Small MD     Reason for Consultation: rectal bleeding     HPI: Pleasant 44-year-old man with acute on chronic anemia, end-stage renal disease on hemodialysis, and recurrent rectal bleeding. Recently underwent EGD and colonoscopy at outside hospital by Dr. Leigh Gallardo, was recommended outpatient capsule endoscopy of the small bowel. Currently in need of hemodialysis as he typically gets it on Monday Wednesday and Friday in his due today. Currently feels okay denies any abdominal pain vomiting or rectal bleeding at this time.     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 (HCC)    Chronic non-seasonal allergic rhinitis    Chronic sinusitis, unspecified location    ADHD (attention deficit hyperactivity disorder), inattentive type    Bipolar depression (HCC)    Essential hypertension    Mild persistent asthma without complication (HCC)    CKD (chronic kidney disease) stage 3, GFR 30-59 ml/min (HCC)    Self-inflicted injury    Bipolar disorder in partial remission, most recent episode unspecified type (HCC)    Family history of prostate cancer    Fatigue, unspecified type    Alkaline phosphatase elevation    Hyperlipidemia, mixed    Low serum HDL    Spinal stenosis of lumbar region with neurogenic claudication    Prolapsed lumbar disc    Status post lumbar surgery    Cauda equina syndrome (HCC)    Lumbar disc prolapse with compression radiculopathy    Syncope and collapse    Elevated troponin    Hypokalemia    Orthostatic hypotension    Hypertension, unspecified type    Weight loss    Chest pain, unspecified type    History of mitral valve stenosis    Acute pericarditis, unspecified type (HCC)    Cervical radiculopathy    Elevated blood protein    IgG monoclonal protein  disorder    MGUS (monoclonal gammopathy of unknown significance)    Hypertensive urgency    Bipolar 2 disorder ([facility])    Employment problem    Stress    Anxiety disorder, unspecified type    Weakness of left lower extremity    Rectal bleeding    Paresthesia of foot, bilateral    Obesity (BMI 30-39.9)    TIA (transient ischemic attack)    TMJ dysfunction    Inverted papilloma of nasal cavity    Type 2 diabetes mellitus with stage 3b chronic kidney disease, without long-term current use of insulin ([facility])    Acute kidney injury ([facility])    Metabolic acidosis    Hyperglycemia    Chronic kidney disease, unspecified CKD stage    Abdominal distension    SOB (shortness of breath)    Hypertensive crisis    Acute on chronic congestive heart failure, unspecified heart failure type ([facility])    Acute congestive heart failure ([facility])    Acute congestive heart failure, unspecified heart failure type ([facility])    Acute on chronic diastolic congestive heart failure ([facility])    Stage 4 chronic kidney disease ([facility])    ROBERT (acute kidney injury) ([facility])    Abdominal pain, left lower quadrant    Hypertensive emergency    Acute chest pain    Acute on chronic renal insufficiency    Chronic abdominal pain    Nonintractable headache, unspecified chronicity pattern, unspecified headache type    Chronic right shoulder pain    HCAP (healthcare-associated pneumonia)    Acute intractable headache, unspecified headache type    ESRD (end stage renal disease) on dialysis ([facility])    Diarrhea, unspecified type    Primary hypertension    Dyspnea, unspecified type    Abdominal pain, acute    ESRD on dialysis ([facility])    Chronic renal failure, unspecified CKD stage    Fluid overload    ESRD needing dialysis ([facility])    COVID-19 virus infection    Hypotension, unspecified hypotension type    Anemia    Acute renal failure (ARF) ([facility])    Neck pain    Acute nonintractable headache, unspecified headache type    GI bleed    Gastrointestinal hemorrhage, unspecified gastrointestinal  hemorrhage type    Chronic kidney disease with end stage renal failure on dialysis ([facility])    Lower abdominal pain    ESRD (end stage renal disease) ([facility])    Resistant hypertension    Community acquired pneumonia of right lower lobe of lung    Acute renal failure with acute renal cortical necrosis superimposed on stage 4 chronic kidney disease (HCC)    Acute renal failure, unspecified acute renal failure type (HCC)    Hypervolemia, unspecified hypervolemia type    End stage renal disease on dialysis (HCC)    Acute respiratory failure with hypoxia ([facility])    Stage 5 chronic kidney disease on chronic dialysis ([facility])    Anemia, unspecified type    Hyperkalemia    Hemodialysis catheter infection, initial encounter ([facility])    Diverticulosis of colon with hemorrhage    Type 2 diabetes mellitus with chronic kidney disease on chronic dialysis, without long-term current use of insulin ([facility])    Coronary artery disease involving native coronary artery of native heart without angina pectoris       PATIENT HISTORY:     Past Medical History:    Asthma ([facility])    Attention deficit hyperactivity disorder (ADHD)    Back problem    Bipolar 1 disorder ([facility])    CKD (chronic kidney disease) stage 3, GFR 30-59 ml/min ([facility])    Dr Meeks    Congestive heart disease ([facility])    COPD (chronic obstructive pulmonary disease) ([facility])    Coronary atherosclerosis    Deep vein thrombosis ([facility])    at age 17 R/T cast    Depression    Diabetes ([facility])    Essential hypertension    3/21 echo: severe concentric LVH with normal EF and no MR or pHTN    Extrinsic asthma, unspecified    Heart attack ([facility])    2016- angiogram- no intervention    Heart valve disease    mitral valve repair in 1994/    High blood pressure    High cholesterol    History of blood transfusion    History of mitral valve repair    Hyperlipidemia    Low back pain    tight and stiff after sweeping and mopping    LVH (left ventricular hypertrophy)    Migraines    Mixed hyperlipidemia     HDL 38  LDL 97 VLDL 57     Monoclonal gammopathy    IgG kappa     MVP (mitral valve prolapse)    Repair 1994 at [place]; echoes as recently as 3/21 show mild or trivial MR and no stenosis    Neuropathy    Osteoarthritis    hip ,knees    Pneumonia due to organism    Pulmonary embolism (HCC)    Renal disorder    Stroke (HCC)    TIA (transient ischemic attack)    Initial history of left-sided weakness and slurred speech. (+) cocaine. MRI of the brain, CT angiogram of the head and neck, and 2D echo are all unremarkable.     TMJ (dislocation of temporomandibular joint)    Troponin level elevated    Trop 60 60 47 with TIA and no CP: Lexiscan negative with EF 51      Past Surgical History:   Procedure Laterality Date    Av fistula revision, open Left     Colonoscopy N/A 03/26/2023    Procedure: COLONOSCOPY;  Surgeon: Heath Vu MD;  Location:  ENDOSCOPY    Colonoscopy N/A 12/30/2023    Procedure: COLONOSCOPY with cold snare polypectomy and forcep polypectomy;  Surgeon: Ousmane Suarez MD;  Location:  ENDOSCOPY    Colonoscopy & polypectomy  2019    Egd  2019    Duodenitis. Biopsied. EUS for weight loss was negative    Heart surgery      Hernia surgery  08/17/2022    Dr Barnes    Laminectomy,>2 sgmt,lumbar  09/06/2018    L4-L5 Decomp Discectomy ROEM L4-L5    Mitralplasty w cp bypass  1994    [facility]: Repair    Repair rotator cuff,chronic Left     torn and had a ruptured bicep    Valve repair  1994    mitral valve      Family History   Problem Relation Age of Onset    Hypertension Father     Alcohol and Other Disorders Associated Father     Substance Abuse Father         cocaine    Dementia Father     Cancer Father         lung    Diabetes Mother     Cancer Mother         multiple myeloma    Hypertension Mother     Anxiety Maternal Aunt     Depression Maternal Aunt     Anxiety Maternal Aunt     Depression Maternal Aunt     Bipolar Disorder Maternal Aunt     Diabetes Maternal Grandmother     Hypertension Maternal  Grandmother     Cancer Maternal Grandfather         stomach cancer    Diabetes Maternal Grandfather     Hypertension Maternal Grandfather     Alcohol and Other Disorders Associated Maternal Grandfather     Hypertension Paternal Grandmother     Hypertension Paternal Grandfather     Cancer Sister         uterine and ovarian    Hypertension Sister     Cancer Maternal Uncle         lung    Cancer Paternal Aunt         throat      Social History     Socioeconomic History    Marital status:    Tobacco Use    Smoking status: Former     Current packs/day: 0.00     Average packs/day: 1 pack/day for 27.0 years (27.0 ttl pk-yrs)     Types: Cigarettes     Start date: 1995     Quit date: 2022     Years since quittin.6     Passive exposure: Never    Smokeless tobacco: Never   Vaping Use    Vaping status: Never Used   Substance and Sexual Activity    Alcohol use: No    Drug use: No     Social Determinants of Health     Financial Resource Strain: Medium Risk (2024)    Received from [facility]    Overall Financial Resource Strain (CARDIA)     Difficulty of Paying Living Expenses: Somewhat hard   Food Insecurity: No Food Insecurity (10/7/2024)    Food Insecurity     Food Insecurity: Never true   Transportation Needs: No Transportation Needs (10/7/2024)    Transportation Needs     Lack of Transportation: No   Physical Activity: Inactive (2024)    Received from [facility]    Exercise Vital Sign     Days of Exercise per Week: 0 days     Minutes of Exercise per Session: 0 min   Stress: Stress Concern Present (2024)    Received from [facility]    [employer] [place] [employer] - Occupational Stress Questionnaire     Feeling of Stress : Rather much   Social Connections: Unknown (2024)    Received from [facility]    Social Connection and Isolation Panel [NHANES]     Frequency of Communication with Friends and Family: More than three times a week      Frequency of Social Gatherings with Friends and Family: More than three times a week     Attends [employer] Services: Never     Active Member of Clubs or Organizations: No     Attends Club or Organization Meetings: Never     Marital Status: Patient unable to answer   Housing Stability: Low Risk  (10/7/2024)    Housing Stability     Housing Instability: No        Allergies;  Allergies   Allergen Reactions    Hydrochlorothiazide RASH and HIVES        Medications:    Current Facility-Administered Medications:     albuterol (Ventolin HFA) 108 (90 Base) MCG/ACT inhaler 2 puff, 2 puff, Inhalation, Q6H PRN    ARIPiprazole (Abilify) tab 15 mg, 15 mg, Oral, Daily    buPROPion ER (Wellbutrin XL) 24 hr tab 150 mg, 150 mg, Oral, Daily    carvedilol (Coreg) tab 25 mg, 25 mg, Oral, BID with meals    cloNIDine (Catapres) tab 0.1 mg, 0.1 mg, Oral, QPM    fenofibrate micronized (Lofibra) cap 134 mg, 134 mg, Oral, Nightly    FLUoxetine (PROzac) cap 40 mg, 40 mg, Oral, Daily    hydrALAZINE (Apresoline) tab 50 mg, 50 mg, Oral, BID    HYDROcodone-acetaminophen (Norco)  MG per tab 1 tablet, 1 tablet, Oral, Q6H PRN    isosorbide mononitrate ER (Imdur) 24 hr tab 30 mg, 30 mg, Oral, Daily    lamoTRIgine (LaMICtal) tab 150 mg, 150 mg, Oral, Daily    levoFLOXacin (Levaquin) tab 500 mg, 500 mg, Oral, Q48HR @ 0700    amphetamine-dextroamphetamine (Adderall) tab 15 mg, 15 mg, Oral, BID@0800,1200    losartan (Cozaar) tab 100 mg, 100 mg, Oral, Daily    memantine (Namenda) tab 5 mg, 5 mg, Oral, BID    NIFEdipine ER (Procardia-XL) 24 hr tab 60 mg, 60 mg, Oral, BID    sevelamer carbonate (Renvela) tab 800 mg, 800 mg, Oral, TID CC    tamsulosin (Flomax) cap 0.4 mg, 0.4 mg, Oral, Daily    umeclidinium bromide (Incruse Ellipta) 62.5 MCG/ACT inhaler 1 puff, 1 puff, Inhalation, Daily    acetaminophen (Tylenol) tab 650 mg, 650 mg, Oral, Q6H PRN    ondansetron (Zofran) 4 MG/2ML injection 4 mg, 4 mg, Intravenous, Q6H PRN    pantoprazole (Protonix) 40 mg  in sodium chloride 0.9% PF 10 mL IV push, 40 mg, Intravenous, Q12H    sodium chloride 0.9 % IV bolus 100 mL, 100 mL, Intravenous, Q30 Min PRN **AND** albumin human (Albumin) 25% injection 25 g, 25 g, Intravenous, PRN Dialysis    heparin (Porcine) 1000 UNIT/ML injection 1,500 Units, 1.5 mL, Intracatheter, Once    hydrALAzine (Apresoline) 20 mg/mL injection 10 mg, 10 mg, Intravenous, Q6H PRN    Vancomycin: PHARMACY DOSING, 1 each, Intravenous, See Admin Instructions (RX holding)    ceFAZolin (Ancef) 1 g in dextrose 5% 100mL IVPB-ADD, 1 g, Intravenous, Q24H    HYDROmorphone (Dilaudid) 1 MG/ML injection 0.2 mg, 0.2 mg, Intravenous, Q2H PRN **OR** HYDROmorphone (Dilaudid) 1 MG/ML injection 0.4 mg, 0.4 mg, Intravenous, Q2H PRN **OR** HYDROmorphone (Dilaudid) 1 MG/ML injection 0.8 mg, 0.8 mg, Intravenous, Q2H PRN    vancomycin (Vancocin) 1,000 mg in sodium chloride 0.9% 250 mL IVPB-ADDV, 10 mg/kg, Intravenous, Once    Vancomycin: PHARMACY DOSING, 1 each, Intravenous, See Admin Instructions (RX holding)     REVIEW OF SYSTEMS:   GENERAL: well developed, well nourished, in no apparent distress  HEENT: normocephalic; normal nose, pharynx and TM's  EYES: PERRLA, EOMI, sclera anicteric, conjunctiva normal; fundi normal  NECK: supple, FROM, no nodes, no JVD, no thyromegaly, no carotid bruits  RESPIRATORY: clear to percussion and auscultation  CARDIOVASCULAR: S1, S2 normal, RRR; no S3, no S4; no click; murmur negative  ABDOMEN: normal, active bowel sounds, no masses, HSM or tenderness  RECTAL: ---  EXTREMITIES: no cyanosis, clubbing or edema, peripheral pulses intact  PSYCHIATRIC: alert, oriented times 3        EXAM:   BP (!) 189/121 (BP Location: Right arm)   Pulse 98   Temp 97.9 °F (36.6 °C) (Oral)   Resp 13   Ht 6' 2\" (1.88 m)   Wt 240 lb (108.9 kg)   SpO2 93%   BMI 30.81 kg/m²  Body mass index is 30.81 kg/m².  GENERAL: Well developed, well nourished, in no obvious distress.   ABDOMEN: Bowel sounds normoactive. Soft, no  organomegaly or masses appreciated. Nontender.   EXTREMITIES: Without cyanosis. No peripheral edema appreciated.   RECTAL: Deferred.   NEURO: Motor and Gait grossly intact. Alert and Oriented x 3.        LAB/IMAGING RESULTS:     Lab Results   Component Value Date    WBC 7.3 10/07/2024    HGB 8.5 10/07/2024    HCT 25.9 10/07/2024    .0 10/07/2024     [unfilled]  Lab Results   Component Value Date    WBC 7.3 10/07/2024    HGB 8.5 10/07/2024    HCT 25.9 10/07/2024    .0 10/07/2024    CREATSERUM 8.35 10/07/2024    BUN 63 10/07/2024     10/07/2024    K 3.8 10/07/2024     10/07/2024    CO2 25.0 10/07/2024    GLU 95 10/07/2024    CA 8.5 10/07/2024    ALB 4.3 10/07/2024    ALKPHO 67 10/06/2024    BILT 0.4 10/06/2024    TP 7.4 10/06/2024    AST 27 10/06/2024    ALT 7 10/06/2024    MG 2.0 10/07/2024    PHOS 6.6 10/07/2024         ASSESSMENT AND PLAN:   Acute on chronic anemia  Chronic rectal bleeding  End-stage renal disease on hemodialysis     Recommendations  - No active bleeding at this time, recommend follow up with the established gastroenterologist for outpatient capsule endoscopy of the small bowel ( Dr Leigh Gallardo [facility])  - Recommend outpatient follow-up with surgery for hemorrhoidectomy   - regular diet   - already had EGD and colonoscopy with Dr Gallardo. No plans for repeat EGD and colonoscopy     The patient indicates understanding of these issues and agrees to the plan.  Elliott Shane DO  10/7/2024  12:06 PM

## 2024-10-07 NOTE — H&P
[facility] Hospitalist History and Physical      Chief Complaint   Patient presents with    GI Bleeding    Difficulty Breathing        PCP: Adrian Small MD      History of Present Illness: Patient is a 44 year old male with PMH sig for Diverticulosis, ESRD on HD, diastolic HF, HTN who presents for rectal bleeding.  Patient states he developed rectal bleeding yesterday which progressively worsened.  States he had 3 bloody bowel movements over the past day.  He also endorses left lower quadrant abdominal pain.  He has a history of diverticulosis and rectal bleeding requiring transfusion in the past.  He was scheduled for capsule endoscopy about a month ago however was not able to complete this.  States he has not had a bowel movement since being in the hospital.  Denies chest pain, shortness of breath, fever, chills.     Past Medical History:    Asthma ([facility])    Attention deficit hyperactivity disorder (ADHD)    Back problem    Bipolar 1 disorder ([facility])    CKD (chronic kidney disease) stage 3, GFR 30-59 ml/min ([facility])    Dr Meeks    Congestive heart disease ([facility])    COPD (chronic obstructive pulmonary disease) ([facility])    Coronary atherosclerosis    Deep vein thrombosis ([facility])    at age 17 R/T cast    Depression    Diabetes ([facility])    Essential hypertension    3/21 echo: severe concentric LVH with normal EF and no MR or pHTN    Extrinsic asthma, unspecified    Heart attack (HCC)    2016- angiogram- no intervention    Heart valve disease    mitral valve repair in 1994/    High blood pressure    High cholesterol    History of blood transfusion    History of mitral valve repair    Hyperlipidemia    Low back pain    tight and stiff after sweeping and mopping    LVH (left ventricular hypertrophy)    Migraines    Mixed hyperlipidemia     HDL 38 LDL 97 VLDL 57     Monoclonal gammopathy    IgG kappa     MVP (mitral valve prolapse)    Repair 1994 at [place]; echoes as recently as 3/21 show mild or trivial MR and  no stenosis    Neuropathy    Osteoarthritis    hip ,knees    Pneumonia due to organism    Pulmonary embolism (HCC)    Renal disorder    Stroke (HCC)    TIA (transient ischemic attack)    Initial history of left-sided weakness and slurred speech. (+) cocaine. MRI of the brain, CT angiogram of the head and neck, and 2D echo are all unremarkable.     TMJ (dislocation of temporomandibular joint)    Troponin level elevated    Trop 60 60 47 with TIA and no CP: Lexiscan negative with EF 51      Past Surgical History:   Procedure Laterality Date    Av fistula revision, open Left     Colonoscopy N/A 2023    Procedure: COLONOSCOPY;  Surgeon: Heath Vu MD;  Location:  ENDOSCOPY    Colonoscopy N/A 2023    Procedure: COLONOSCOPY with cold snare polypectomy and forcep polypectomy;  Surgeon: Ousmane Suarez MD;  Location:  ENDOSCOPY    Colonoscopy & polypectomy      [facility]  2019    Duodenitis. Biopsied. EUS for weight loss was negative    Heart surgery      Hernia surgery  2022    Dr Barnes    Laminectomy,>2 sgmt,lumbar  2018    L4-L5 Decomp Discectomy ROEM L4-L5    Mitralplasty w cp bypass      [facility]: Repair    Repair rotator cuff,chronic Left     torn and had a ruptured bicep    Valve repair      mitral valve        ALL:  Allergies   Allergen Reactions    Hydrochlorothiazide RASH and HIVES        No current outpatient medications on file.       Social History     Tobacco Use    Smoking status: Former     Current packs/day: 0.00     Average packs/day: 1 pack/day for 27.0 years (27.0 ttl pk-yrs)     Types: Cigarettes     Start date: 1995     Quit date: 2022     Years since quittin.6     Passive exposure: Never    Smokeless tobacco: Never   Substance Use Topics    Alcohol use: No        Fam Hx  Family History   Problem Relation Age of Onset    Hypertension Father     Alcohol and Other Disorders Associated Father     Substance Abuse Father         cocaine    Dementia Father      Cancer Father         lung    Diabetes Mother     Cancer Mother         multiple myeloma    Hypertension Mother     Anxiety Maternal Aunt     Depression Maternal Aunt     Anxiety Maternal Aunt     Depression Maternal Aunt     Bipolar Disorder Maternal Aunt     Diabetes Maternal Grandmother     Hypertension Maternal Grandmother     Cancer Maternal Grandfather         stomach cancer    Diabetes Maternal Grandfather     Hypertension Maternal Grandfather     Alcohol and Other Disorders Associated Maternal Grandfather     Hypertension Paternal Grandmother     Hypertension Paternal Grandfather     Cancer Sister         uterine and ovarian    Hypertension Sister     Cancer Maternal Uncle         lung    Cancer Paternal Aunt         throat       Review of Systems  Comprehensive ROS reviewed and negative except for what is stated in HPI.      OBJECTIVE:  BP (!) 189/121 (BP Location: Right arm)   Pulse 98   Temp 97.9 °F (36.6 °C) (Oral)   Resp 13   Ht 6' 2\" (1.88 m)   Wt 240 lb (108.9 kg)   SpO2 93%   BMI 30.81 kg/m²   Physical Exam:  General: Alert, awake, cooperative.  HEENT:  Normocephalic, atraumatic.  Neck:  Trachea midline.  No JVD.   Chest:  Clear to auscultation bilaterally. No wheezes, rales, or rhonchi.  Right HD cath.  CV:  Regular rate and rhythm.  Positive S1/S2.  GI: Bowel sounds present in all four quadrants, abdomen is soft, tender to palpation.  Extremities:  No lower extremity edema or cyanosis.  Neurological:  AAOx4. No focal deficits.  Skin:  Warm and dry.        Data Review:    LABS:   Lab Results   Component Value Date    WBC 7.3 10/07/2024    HGB 8.5 10/07/2024    HCT 25.9 10/07/2024    .0 10/07/2024    CREATSERUM 8.35 10/07/2024    BUN 63 10/07/2024     10/07/2024    K 3.8 10/07/2024     10/07/2024    CO2 25.0 10/07/2024    GLU 95 10/07/2024    CA 8.5 10/07/2024    ALB 4.3 10/07/2024    ALKPHO 67 10/06/2024    BILT 0.4 10/06/2024    TP 7.4 10/06/2024    AST 27 10/06/2024     ALT 7 10/06/2024    MG 2.0 10/07/2024    PHOS 6.6 10/07/2024           Radiology: CT ABDOMEN+PELVIS(CONTRAST ONLY)(CPT=74177)    Result Date: 10/6/2024  PROCEDURE:  CT ABDOMEN+PELVIS (CONTRAST ONLY) (CPT=74177)  COMPARISON:  [facility], CT, CT ABDOMEN+PELVIS(CPT=74176), 9/15/2024, 9:10 PM.  [facility], CT, CT ABDOMEN+PELVIS(CPT=74176), 8/30/2024, 9:57 AM.  [facility], CT, CT ABDOMEN+PELVIS(CONTRAST ONLY)(CPT=74177), 6/25/2024, 7:21 AM.  [facility], CT, CT ABDOMEN+PELVIS KIDNEYSTONE 2D RNDR(NO IV,NO ORAL)(CPT=74176), 6/23/2024, 9:21 AM.  INDICATIONS:  Rectal bleeding, sob  TECHNIQUE:  CT scanning was performed from the dome of the diaphragm to the pubic symphysis with non-ionic intravenous contrast material. Post contrast coronal MPR imaging was performed.  Dose reduction techniques were used. Dose information is transmitted to the ACR (American College of Radiology) NRDR (National Radiology Data Registry) which includes the Dose Index Registry.  PATIENT STATED HISTORY:(As transcribed by Technologist)  Rectal bleeding, sob   CONTRAST USED:  100cc of Isovue 370  FINDINGS:  LIVER:  No enlargement, atrophy, abnormal density, or significant focal lesion.  BILIARY:  No visible dilatation or calcification.  PANCREAS:  No lesion, fluid collection, ductal dilatation, or atrophy.  SPLEEN:  No enlargement or focal lesion.  KIDNEYS:  No mass, obstruction, or calcification.  Tiny cyst in the right and left midpole of the kidney measures 4 mm. ADRENALS:  No mass or enlargement.  AORTA/VASCULAR:  No aneurysm or dissection.  RETROPERITONEUM:  No mass or adenopathy.  BOWEL/MESENTERY:  No visible mass, obstruction, or bowel wall thickening.  There are scattered diverticula throughout the colon without evidence of acute diverticulitis.  The appendix is normal.  Visualized small bowel is unremarkable. ABDOMINAL WALL:  No mass or hernia.  URINARY BLADDER:  There is diffuse mild wall thickening of the urinary bladder which can be seen  with infectious cystitis. PELVIC NODES:  No adenopathy.  PELVIC ORGANS:  No visible mass.  Pelvic organs appropriate for patient age.  BONES:  No bony lesion or fracture.  Moderate to severe degenerative disc disease at L4-5 and L5-S1. LUNG BASES:  Decreased size of right pleural effusion with subsegmental atelectasis at the right lung base.  OTHER:  Negative.             CONCLUSION:   1. Diverticulosis of the colon without ends of diverticulitis.   2. Bladder wall thickening can be seen with cystitis.    LOCATION:  [facility]   Dictated by (CST): Jaycob Cassidy MD on 10/06/2024 at 10:14 PM     Finalized by (CST): Jaycob Cassidy MD on 10/06/2024 at 10:19 PM       CT CHEST(CONTRAST ONLY) (CPT=71260)    Result Date: 9/29/2024  PROCEDURE:  CT CHEST(CONTRAST ONLY) (CPT=71260)  COMPARISON:  [facility], CT, CTA CHEST + CT ABD (W) + CT PEL (W) SH(CPT=71275/41685), 11/23/2023, 4:31 PM.  INDICATIONS:  ID request - possible tunneled R subclavian catheter infection.  TECHNIQUE:  CT images were obtained with non-ionic intravenous contrast material. Dose reduction techniques were used. Dose information is transmitted to the ACR (American College of Radiology) NRDR (National Radiology Data Registry) which includes the Dose Index Registry.  PATIENT STATED HISTORY:(As transcribed by Technologist)   body aches, chill, night sweats- noticed drainage from dialysis cath this am   CONTRAST USED:  75cc of Isovue 370  FINDINGS:  LUNGS:  Multifocal dependent wedge-shaped areas of consolidation in lower lobes are noted and appears similar to previous study likely indicating chronic postinflammatory scar and atelectasis.  There is emphysematous change noted in upper lobes. VASCULATURE:  No visible pulmonary arterial thrombus or attenuation.  BASIL:  Right hilar lymph node measures 2.8 x 1.7 cm (previously 2.5 x 1.5 cm). MEDIASTINUM:  Low right paratracheal lymph node series 2, image 41 measures 2.2 x 1.4 cm (previously 2.2 x 1.5 cm). CARDIAC:   Metallic densities within the mitral valve are presumably from prior surgical reconstruction or replacement of the valve. PLEURA:  No mass or effusion.  THORACIC AORTA:  No aneurysm.  CHEST WALL:  Right internal jugular tunneled dialysis catheter is noted.  The tip of this is in SVC.  The entirety of the catheter is not included on the chest CT.  There is no acute abnormality associated with the catheter within the limits of this study.  LIMITED ABDOMEN:  Limited images of the upper abdomen are unremarkable.  BONES:  No bony lesion or fracture.             CONCLUSION:  1. Right hilar and right mediastinal lymph nodes have increased slightly in size and are probably reactive. 2. Wedge-shaped areas of density in the right lung are unchanged and probably a combination of chronic scar and atelectasis. 3. Residual prior mitral valve surgery is noted. 4. There is otherwise no acute CT finding.   LOCATION:  [facility]   Dictated by (CST): Adrian Blevins MD on 9/29/2024 at 5:05 PM     Finalized by (CST): Adrian Blevins MD on 9/29/2024 at 5:09 PM       XR CHEST AP PORTABLE  (CPT=71045)    Result Date: 9/29/2024  PROCEDURE:  XR CHEST AP PORTABLE  (CPT=71045)  TECHNIQUE:  AP chest radiograph was obtained.  COMPARISON:  [facility], XR, XR CHEST AP PORTABLE  (CPT=71045), 9/15/2024, 8:01 PM.  INDICATIONS:  fatigue,pain and oozing from port nausea and vomiting . seen yesterday for same symptoms  PATIENT STATED HISTORY: (As transcribed by Technologist)  Pt c/o fatigue and nausea. Hx of heart surgery.    FINDINGS:  Valve prosthesis noted.  Cardiomegaly with normal pulmonary vascularity.  Small right pleural effusion.  Right dialysis catheter tips in the SVC.  No evidence of pneumothorax.            CONCLUSION:  No lobar pneumonia or overt congestive failure.  Small right pleural effusion.   LOCATION:  [id number]      Dictated by (CST): Jj Conley MD on 9/29/2024 at 2:44 PM     Finalized by (CST): Jj Conley MD on 9/29/2024 at 2:44  PM       IR CENTRAL VENOUS ACCESS    Result Date: 9/20/2024            PROCEDURE:  IR CENTRAL VENOUS CATHETER INSERTION  COMPARISON:  None.  INDICATIONS:  Dialysis, end-stage renal disease  TECHNIQUE:  Prior to the procedure, I discussed with the patient and/or legal representative the potential benefits, risks, and side effects of this procedure, the likelihood of the patient achieving goals; and the potential problems that might occur during recuperation.  I discussed reasonable alternatives to the procedure, including risks, benefits, steps to prevent infection and side effects related to the alternatives, and risks related to not receiving this procedure. A witnessed verbal and signed consent was obtained and documented in the patient's chart.  IV was checked and maintained by the nurse. Moderate conscious sedation was performed under continuous pulse oximetry and cardiac monitoring under my direct supervision.  The radiology nurse was in attendance throughout the exam. Moderate conscious sedation of 4 mg Versed and 200 mcg of Fentanyl was given.  Patient was assessed and monitoring of oxygen saturation, heart rate, and blood pressure by the nursing staff and myself during the exam for a total intraservice time of 30 minutes of conscious sedation time from 8:22 a.m. to 8:52 a.m..  2 grams of Ancef were given pre-procedurally via existing IV.  The patient was placed supine on the angiographic table and the right chest and neck was prepped and covered with a full body drape in the usual sterile fashion.  All operators present for the case performed standard pre-procedural prep including hand washing, sterile gloves, gown, mask, and cap.  All aspects of the maximum sterile barrier technique were followed.  A preprocedural time out was performed with all physicians, technologists, and nurses involved with the procedure. Sonography of the right  neck demonstrated included right internal jugular vein and a patent  right external jugular vein and a permanent image was obtained.  Under sonographic guidance, the right external jugular vein was accessed using a micropuncture system.  A guidewire was advanced into the IVC under fluoroscopic guidance.  Using blunt dissection a dual lumen tunneled catheter was placed via a peel-away sheath.  Both lumens flushed and returned easily.  The catheter was secured and heparinized.  A small amount of Dermabond was placed at the neck venotomy site.   Sterile gauze and transparent dressing was applied.  The patient tolerated the procedure well and there were no immediate complications. Routine post tunneled catheter insertion instructions were communicated to the patient.  ESTIMATED BLOOD LOSS: Less than 5cc.  FLUORO TIME/DOSE: 0.2 minutes  AIR KERMA: 1.13 mGy  IMPRESSION: Successful placement of right external jugular vein tunneled dialysis catheter ( 23 cm tip to cuff Bard catheter).  The right internal jugular vein was occluded more centrally.  PLAN: The patient will follow-up with nephrology and at dialysis.  Catheter is ready for use, routine post-catheter care.  LOCATION:  [facility]    Dictated by (CST): Quinn Bernal MD on 9/20/2024 at 9:43 AM     Finalized by (CST): Quinn Bernal MD on 9/20/2024 at 9:44 AM       US VENOUS DOPPLER LEG LEFT - DIAG IMG (CPT=93971)    Result Date: 9/19/2024  PROCEDURE:  US VENOUS DOPPLER LEG LEFT - DIAG IMG (CPT=93971)  COMPARISON:  [facility], US DUPLEX SCAN HEMODIALYSIS ACCESS  (CPT=93990), 9/18/2024, 2:38 PM.  INDICATIONS:  Left calf pain  TECHNIQUE:  Real time, grey scale, and duplex ultrasound was used to evaluate the lower extremity venous system. B-mode two-dimensional images of the vascular structures, Doppler spectral analysis, and color flow.  Doppler imaging were performed.  The following veins were imaged:  Common, deep, and superficial femoral, popliteal, sapheno-femoral junction, posterior tibial veins, and the contralateral common  femoral vein.  PATIENT STATED HISTORY: (As transcribed by Technologist)     FINDINGS:  EXTREMITY EXAMINED:  Left lower extremity SAPHENOFEMORAL JUNCTION:  No reflux. THROMBI:  None visible. COMPRESSION:  Normal compressibility, phasicity, and augmentation. OTHER:  Negative.            CONCLUSION:  Negative for deep venous thrombosis of the left lower extremity   LOCATION:  [facility]    Dictated by (CST): Claude Singh MD on 9/19/2024 at 10:06 AM     Finalized by (CST): Claude Singh MD on 9/19/2024 at 10:06 AM       US DUPLEX SCAN HEMODIALYSIS ACCESS  (CPT=93990)    Result Date: 9/18/2024  PROCEDURE:  US DUPLEX SCAN HEMODIALYSIS ACCESS  (CPT=93990)  COMPARISON:  None.  INDICATIONS:  LUE AVF access issue  TECHNIQUE:  Real time gray scale and duplex color Doppler sonography was used to evaluate the left upper extremity arterial and venous system. Being noted two-dimensional images of the vascular structures, Doppler spectral analysis, and color Doppler imaging were performed  PATIENT STATED HISTORY: (As transcribed by Technologist)     FINDINGS:   Left UPPER EXTREMITY ARTERIAL: Proximal brachial: diameter 8 mm, velocity 109 centimeters/sec Mid brachial: diameter 7 mm, velocity 77 centimeters/sec Distal brachial: diameter 7 mm, velocity 83 centimeters/sec Proximal radial: diameter 6 mm, velocity 95 centimeters/sec Mid radial: diameter 6 mm, velocity 89 centimeters/sec Distal radial: diameter 6 mm, velocity 89 centimeters/sec Fistula/graft arterial anastomosis: diameter 7 mm, velocity 345 centimeters/sec Proximal graft: diameter 3 mm, velocity 380 centimeters/sec Mid graft: diameter 4 mm, velocity 74 centimeters/sec Distal graft: diameter 67 mm, velocity 4 centimeters/sec  Left UPPER EXTREMITY VENOUS: Proximal arm cephalic vein: diameter 2 mm, velocity 6 centimeters/sec Mid arm cephalic vein:  diameter 2 mm, velocity 9 centimeters/sec Distal arm cephalic vein: diameter 2 mm, velocity 5 centimeters/sec Prox basilic vein:  diameter 7 mm, velocity 24 centimeters/sec Mid basilic vein: diameter 6 mm, velocity 44 centimeters/sec Distal basilic vein: diameter 6 mm, velocity 29 centimeters/sec            CONCLUSION:  Elevated velocity of the left upper extremity fistula at the anastomosis and proximal venous outflow is suggestive of stenosis in this region.   LOCATION:  [id number]    Dictated by (CST): Smith Randle MD on 9/18/2024 at 4:07 PM     Finalized by (CST): Simth Randle MD on 9/18/2024 at 4:12 PM       CT ABDOMEN+PELVIS(CPT=74176)    Result Date: 9/15/2024  PROCEDURE:  CT ABDOMEN+PELVIS (CPT=74176)  COMPARISON:  [facility], CT, CT ABDOMEN+PELVIS(CPT=74176), 8/30/2024, 9:57 AM.  INDICATIONS:  perla, abd pain  TECHNIQUE:  Unenhanced multislice CT scanning was performed from the dome of the diaphragm to the pubic symphysis.  Dose reduction techniques were used. Dose information is transmitted to the ACR (American College of Radiology) NRDR (National Radiology Data Registry) which includes the Dose Index Registry.  PATIENT STATED HISTORY: (As transcribed by Technologist)     FINDINGS:  LIVER:  No enlargement, atrophy, abnormal density, or significant focal lesion.  BILIARY:  No visible dilatation or calcification.  PANCREAS:  No lesion, fluid collection, ductal dilatation, or atrophy.  SPLEEN:  No enlargement or focal lesion.  KIDNEYS:  No mass, obstruction, or calcification.  ADRENALS:  No mass or enlargement.  AORTA/VASCULAR:    Unremarkable as seen on non-contrast imaging. RETROPERITONEUM:  No mass or adenopathy.  BOWEL/MESENTERY:  There is diffuse diverticulosis of the colon.  There is no CT evidence of diverticulitis. ABDOMINAL WALL:  No mass or hernia.  URINARY BLADDER:  No visible focal wall thickening, lesion, or calculus.  PELVIC NODES:  No adenopathy.  PELVIC ORGANS:  No visible mass.  Pelvic organs appropriate for patient age.  BONES:  There is advanced degenerative disc disease in the lumbar spine. LUNG BASES:  Dependent atelectasis  right lower lobe is noted.  There is a small right pleural effusion. OTHER:  Negative.             CONCLUSION:  1. A specific etiology for abdominal pain is not evident from this study. 2. There is diverticulosis of colon without CT evidence of diverticulitis. 3. Small right pleural effusion has decreased since prior study.    LOCATION:  [facility]   Dictated by (CST): Adrian Blevins MD on 9/15/2024 at 10:12 PM     Finalized by (CST): Adrian Blevins MD on 9/15/2024 at 10:15 PM       XR CHEST AP PORTABLE  (CPT=71045)    Result Date: 9/15/2024  PROCEDURE:  XR CHEST AP PORTABLE  (CPT=71045)  TECHNIQUE:  AP chest radiograph was obtained.  COMPARISON:  [facility] , XR, XR CHEST PA + LAT CHEST (SIF=59857), 9/02/2024, 7:58 AM.  [facility] , XR, XR CHEST AP PORTABLE  (CPT=71045), 8/30/2024, 11:23 PM.  INDICATIONS:  perla, abd pain  PATIENT STATED HISTORY: (As transcribed by Technologist)  Patient states he has difficulty in breathing and productive cough for 2 days. History of asthma and pneumonia.    FINDINGS:  There is interval improvement in consolidation right lower lobe consistent with improved right lower lobe pneumonia or atelectasis.  The remainder of the lungs is clear.  Heart size is within normal limits.  Mediastinum and elenita are unremarkable.  Right internal jugular tunneled dialysis catheter has been removed.            CONCLUSION:  1. There is interval removal of right tunneled dialysis catheter. 2. There is improvement in opacity right lung base consistent with approved atelectasis or pneumonia.    LOCATION:  [facility]      Dictated by (CST): Adrian Blevins MD on 9/15/2024 at 8:23 PM     Finalized by (CST): Adrian Blevins MD on 9/15/2024 at 8:24 PM          Assessment/Plan:   44 year old male with PMH sig for Diverticulosis, ESRD on HD, diastolic HF, HTN who presents for rectal bleeding.    # Diverticular bleed suspected  # Diverticulosis  # Acute on chronic anemia  -CT A/P significant for colonic diverticulosis without  diverticulitis.  -Hemoglobin 8.5 on admission, baseline ~9-10.  Trend CBC, transfuse as needed.  -Pain control as needed, noted to possibly have SUDD component of diverticular disease in previous admission  -Keep n.p.o.for now  -GI consulted    # HD catheter line site infection  -Cont IV vanc/ ancef as per ID from previous encounter for cellulitis at HD cath site    #Prostatitis  -Cont Levaquin course for previously diagnosed prostatitis     # ESRD on HD  # Chronic diastolic heart failure  -Nephrology consulted   -Optimize volume status with hemodialysis    # Hypertension  -Continue losartan, Imdur, nifedipine, Coreg, clonidine    #Depression  #Bipolar disorder  -Continue bupropion, Abilify fluoxetine, Lamictal    DVT ppx: SCDs  Code Status: Full Code      Outpatient records or previous hospital records reviewed.   [facility] hospitalist to continue to follow patient while in house  A total of 76 minutes taken with patient and coordinating care.  Greater than 50% face to face encounter.      Vicky Weiss DO  [facility] Hospitalist      **Certification      PHYSICIAN Certification of Need for Inpatient Hospitalization - Initial Certification    Patient will require inpatient services that will reasonably be expected to span two midnight's based on the clinical documentation in H+P.   Based on patients current state of illness, I anticipate that, after discharge, patient will require TBD.

## 2024-10-07 NOTE — PLAN OF CARE
Assumed care at approx 0130.  A/Ox4, RA, NSR on tele; VSS.  BP elevated; PRN hydralazine given.   Reporting acute left sided abdominal pain, worse w/ palpation; addressed w/ PRN meds and hot packs. Abdomen rounded and soft. Bowel sounds present.  No rectal bleeding present during shift.  Plan for GI c/s this AM.  Hemodialysis M/W/F; [facility] notified this AM to schedule.  Safety and comfort measures in place.  Pt updated on POC.

## 2024-10-08 VITALS
BODY MASS INDEX: 30.8 KG/M2 | HEIGHT: 74 IN | HEART RATE: 91 BPM | WEIGHT: 240 LBS | OXYGEN SATURATION: 95 % | RESPIRATION RATE: 20 BRPM | DIASTOLIC BLOOD PRESSURE: 72 MMHG | SYSTOLIC BLOOD PRESSURE: 110 MMHG | TEMPERATURE: 98 F

## 2024-10-08 PROBLEM — K57.31 DIVERTICULOSIS OF COLON WITH HEMORRHAGE: Status: RESOLVED | Noted: 2024-10-06 | Resolved: 2024-10-08

## 2024-10-08 LAB
ALBUMIN SERPL-MCNC: 4.5 G/DL (ref 3.2–4.8)
ANION GAP SERPL CALC-SCNC: 11 MMOL/L (ref 0–18)
BASOPHILS # BLD AUTO: 0.05 X10(3) UL (ref 0–0.2)
BASOPHILS NFR BLD AUTO: 0.9 %
BUN BLD-MCNC: 42 MG/DL (ref 9–23)
CALCIUM BLD-MCNC: 9.1 MG/DL (ref 8.7–10.4)
CHLORIDE SERPL-SCNC: 101 MMOL/L (ref 98–112)
CO2 SERPL-SCNC: 23 MMOL/L (ref 21–32)
CREAT BLD-MCNC: 7.45 MG/DL
EGFRCR SERPLBLD CKD-EPI 2021: 8 ML/MIN/1.73M2 (ref 60–?)
EOSINOPHIL # BLD AUTO: 0.23 X10(3) UL (ref 0–0.7)
EOSINOPHIL NFR BLD AUTO: 4 %
ERYTHROCYTE [DISTWIDTH] IN BLOOD BY AUTOMATED COUNT: 14.2 %
GLUCOSE BLD-MCNC: 164 MG/DL (ref 70–99)
HCT VFR BLD AUTO: 28.3 %
HGB BLD-MCNC: 9.4 G/DL
IMM GRANULOCYTES # BLD AUTO: 0.02 X10(3) UL (ref 0–1)
IMM GRANULOCYTES NFR BLD: 0.3 %
LYMPHOCYTES # BLD AUTO: 1.03 X10(3) UL (ref 1–4)
LYMPHOCYTES NFR BLD AUTO: 17.9 %
MAGNESIUM SERPL-MCNC: 2.2 MG/DL (ref 1.6–2.6)
MCH RBC QN AUTO: 31.9 PG (ref 26–34)
MCHC RBC AUTO-ENTMCNC: 33.2 G/DL (ref 31–37)
MCV RBC AUTO: 95.9 FL
MONOCYTES # BLD AUTO: 0.75 X10(3) UL (ref 0.1–1)
MONOCYTES NFR BLD AUTO: 13 %
NEUTROPHILS # BLD AUTO: 3.67 X10 (3) UL (ref 1.5–7.7)
NEUTROPHILS # BLD AUTO: 3.67 X10(3) UL (ref 1.5–7.7)
NEUTROPHILS NFR BLD AUTO: 63.9 %
OSMOLALITY SERPL CALC.SUM OF ELEC: 294 MOSM/KG (ref 275–295)
PHOSPHATE SERPL-MCNC: 7.6 MG/DL (ref 2.4–5.1)
PLATELET # BLD AUTO: 217 10(3)UL (ref 150–450)
POTASSIUM SERPL-SCNC: 4.2 MMOL/L (ref 3.5–5.1)
RBC # BLD AUTO: 2.95 X10(6)UL
SODIUM SERPL-SCNC: 135 MMOL/L (ref 136–145)
WBC # BLD AUTO: 5.8 X10(3) UL (ref 4–11)

## 2024-10-08 PROCEDURE — 90471 IMMUNIZATION ADMIN: CPT

## 2024-10-08 PROCEDURE — 80069 RENAL FUNCTION PANEL: CPT | Performed by: INTERNAL MEDICINE

## 2024-10-08 PROCEDURE — 85025 COMPLETE CBC W/AUTO DIFF WBC: CPT | Performed by: STUDENT IN AN ORGANIZED HEALTH CARE EDUCATION/TRAINING PROGRAM

## 2024-10-08 PROCEDURE — 83735 ASSAY OF MAGNESIUM: CPT | Performed by: INTERNAL MEDICINE

## 2024-10-08 PROCEDURE — 36415 COLL VENOUS BLD VENIPUNCTURE: CPT | Performed by: INTERNAL MEDICINE

## 2024-10-08 RX ORDER — HYDROCODONE BITARTRATE AND ACETAMINOPHEN 5; 325 MG/1; MG/1
1-2 TABLET ORAL EVERY 4 HOURS PRN
Qty: 15 TABLET | Refills: 0 | Status: SHIPPED | OUTPATIENT
Start: 2024-10-08 | End: 2024-10-11

## 2024-10-08 RX ORDER — HYDROCORTISONE ACETATE 25 MG/1
25 SUPPOSITORY RECTAL 2 TIMES DAILY
Status: DISCONTINUED | OUTPATIENT
Start: 2024-10-08 | End: 2024-10-08

## 2024-10-08 NOTE — DISCHARGE SUMMARY
.DM Internal Medicine Discharge Summary    Patient ID:  Godwin Fonseca  [id number]  44 year old  4/12/1980    Admit date: 9/29/2024  Discharge date and time: 10/1/2024  Attending Physician: Laila Ryder MD  Primary Care Physician: Adrian Small MD     Admit Dx: Hemodialysis catheter infection, initial encounter (HCC) [T82.7XXA]    Primary Diagnosis at discharge from Hospital: Other: HD site infection; no TCM follow-up needed     Secondary Diagnoses:  Esrd on hd  Htn  Chronic diastolic hf  cad    Risk of readmission: Godwin Fonseca has Moderate Risk of readmission after discharge from the hospital.    Discharged Condition: fair    Disposition: home    Important follow up:  Adrian Arce MD  [address] [id number] [phone]    Schedule an appointment as soon as possible for a visit in 1 week(s)          Reason for admission and hospital course:   Pt presented with feeling poorly and concern for dialysis catheter infection in setting of HD catheter that he uses for HD. He had missed previous dialysis session due to feeling unwell. CT chest without evidence of deeper infection. Seen by ID and started on abx. Blood cultures neg to date. Given Iv cefazolin and iv vanco and plan to continue this on dc with HD. Other medical issues were stable.     Day of discharge Exam  Vitals:    10/01/24 1130   BP: (!) 135/95   Pulse: 89   Resp: 19   Temp: 98 °F (36.7 °C)     Exam on day of discharge:  Gen: No acute distress, alert and oriented  CV: RRR, +s1/s2  Lungs: CTAB, good respiratory effort  Abdomen: s/nt/nd  Ext: Moves all 4 extremities, no c/c/e  Neuro: CN Intact, no focal deficits    Discharge meds     Medication List        START taking these medications      ceFAZolin in sodium chloride 0.9% 3 g/100mL Soln  Commonly known as: Ancef  Inject 100 mL (3 g total) into the vein Every Friday for 2 doses.     * ceFAZolin Sodium 2 g Solr  Inject 2 g into the vein Every Wednesday for 2 doses.     * ceFAZolin Sodium 2 g  Solr  Inject 2 g into the vein Every Monday for 2 doses.     Vancomycin HCl in NaCl 1-0.9 GM/250ML-% Soln  Inject 250 mL (1 g total) into the vein Every Monday, Wednesday, and Friday for 14 days. Pharmacy to dose, trough goals 15-20.           * This list has 2 medication(s) that are the same as other medications prescribed for you. Read the directions carefully, and ask your doctor or other care provider to review them with you.                CHANGE how you take these medications      cloNIDine 0.1 MG Tabs  Commonly known as: Catapres  Take 1 tablet (0.1 mg total) by mouth 2 (two) times daily.  What changed: when to take this            CONTINUE taking these medications      albuterol 108 (90 Base) MCG/ACT Aers  Commonly known as: Ventolin HFA     ARIPiprazole 15 MG Tabs  Commonly known as: Abilify     buPROPion  MG Tb24  Commonly known as: Wellbutrin XL     carvedilol 25 MG Tabs  Commonly known as: Coreg  Take 1 tablet (25 mg total) by mouth in the morning and 1 tablet (25 mg total) in the evening. Take with meals.     Fenofibrate 134 MG Caps  Take 1 capsule by mouth nightly.     FLUoxetine HCl 40 MG Caps  Commonly known as: PROZAC  Take 1 capsule (40 mg total) by mouth daily.     fluticasone propionate 50 MCG/ACT Susp  Commonly known as: Flonase  SPRAY ONCE INTO EACH NOSTRIL BID PRN     hydrALAZINE 50 MG Tabs  Commonly known as: Apresoline     HYDROcodone-acetaminophen  MG Tabs  Commonly known as: Norco  Take 1 tablet by mouth every 6 (six) hours as needed for Pain.     isosorbide mononitrate ER 30 MG Tb24  Commonly known as: Imdur     lamoTRIgine 150 MG Tabs  Commonly known as: LaMICtal  Take 1 tablet (150 mg total) by mouth daily.     levoFLOXacin 250 MG Tabs  Commonly known as: Levaquin     Lisdexamfetamine Dimesylate 70 MG Caps  Commonly known as: VYVANSE     losartan 100 MG Tabs  Commonly known as: Cozaar     memantine 5 MG Tabs  Commonly known as: Namenda     NIFEdipine ER 60 MG Tb24  Commonly  known as: ADALAT CC     Renvela 800 MG Tabs  Generic drug: sevelamer carbonate     tamsulosin 0.4 MG Caps  Commonly known as: Flomax     tiotropium 18 MCG Caps  Commonly known as: Spiriva Handihaler            ASK your doctor about these medications      ondansetron 4 MG Tbdp  Commonly known as: Zofran-ODT  Take 1 tablet (4 mg total) by mouth every 4 (four) hours as needed for Nausea.  Ask about: Should I take this medication?               Where to Get Your Medications        You can get these medications from any pharmacy    Bring a paper prescription for each of these medications  ceFAZolin in sodium chloride 0.9% 3 g/100mL Soln  ceFAZolin Sodium 2 g Solr  ceFAZolin Sodium 2 g Solr  Vancomycin HCl in NaCl 1-0.9 GM/250ML-% Soln       Consults: IP CONSULT TO NEPHROLOGY  IP CONSULT TO INFECTIOUS DISEASE  Radiology: CT ABDOMEN+PELVIS(CONTRAST ONLY)(CPT=74177)    Result Date: 10/6/2024  PROCEDURE:  CT ABDOMEN+PELVIS (CONTRAST ONLY) (CPT=74177)  COMPARISON:  [facility], CT, CT ABDOMEN+PELVIS(CPT=74176), 9/15/2024, 9:10 PM.  [facility], CT, CT ABDOMEN+PELVIS(CPT=74176), 8/30/2024, 9:57 AM.  [facility], CT, CT ABDOMEN+PELVIS(CONTRAST ONLY)(CPT=74177), 6/25/2024, 7:21 AM.  [facility], CT, CT ABDOMEN+PELVIS KIDNEYSTONE 2D RNDR(NO IV,NO ORAL)(CPT=74176), 6/23/2024, 9:21 AM.  INDICATIONS:  Rectal bleeding, sob  TECHNIQUE:  CT scanning was performed from the dome of the diaphragm to the pubic symphysis with non-ionic intravenous contrast material. Post contrast coronal MPR imaging was performed.  Dose reduction techniques were used. Dose information is transmitted to the ACR (American College of Radiology) NRDR (National Radiology Data Registry) which includes the Dose Index Registry.  PATIENT STATED HISTORY:(As transcribed by Technologist)  Rectal bleeding, sob   CONTRAST USED:  100cc of Isovue 370  FINDINGS:  LIVER:  No enlargement, atrophy, abnormal density, or significant focal lesion.  BILIARY:  No visible dilatation or  calcification.  PANCREAS:  No lesion, fluid collection, ductal dilatation, or atrophy.  SPLEEN:  No enlargement or focal lesion.  KIDNEYS:  No mass, obstruction, or calcification.  Tiny cyst in the right and left midpole of the kidney measures 4 mm. ADRENALS:  No mass or enlargement.  AORTA/VASCULAR:  No aneurysm or dissection.  RETROPERITONEUM:  No mass or adenopathy.  BOWEL/MESENTERY:  No visible mass, obstruction, or bowel wall thickening.  There are scattered diverticula throughout the colon without evidence of acute diverticulitis.  The appendix is normal.  Visualized small bowel is unremarkable. ABDOMINAL WALL:  No mass or hernia.  URINARY BLADDER:  There is diffuse mild wall thickening of the urinary bladder which can be seen with infectious cystitis. PELVIC NODES:  No adenopathy.  PELVIC ORGANS:  No visible mass.  Pelvic organs appropriate for patient age.  BONES:  No bony lesion or fracture.  Moderate to severe degenerative disc disease at L4-5 and L5-S1. LUNG BASES:  Decreased size of right pleural effusion with subsegmental atelectasis at the right lung base.  OTHER:  Negative.             CONCLUSION:   1. Diverticulosis of the colon without ends of diverticulitis.   2. Bladder wall thickening can be seen with cystitis.    LOCATION:  [facility]   Dictated by (CST): Jaycob Cassidy MD on 10/06/2024 at 10:14 PM     Finalized by (CST): Jaycob Cassidy MD on 10/06/2024 at 10:19 PM       CT CHEST(CONTRAST ONLY) (CPT=71260)    Result Date: 9/29/2024  PROCEDURE:  CT CHEST(CONTRAST ONLY) (CPT=71260)  COMPARISON:  [facility], CT, CTA CHEST + CT ABD (W) + CT PEL (W) (CPT=71275/56513), 11/23/2023, 4:31 PM.  INDICATIONS:  ID request - possible tunneled R subclavian catheter infection.  TECHNIQUE:  CT images were obtained with non-ionic intravenous contrast material. Dose reduction techniques were used. Dose information is transmitted to the ACR (American College of Radiology) NRDR (National Radiology Data Registry) which  includes the Dose Index Registry.  PATIENT STATED HISTORY:(As transcribed by Technologist)   body aches, chill, night sweats- noticed drainage from dialysis cath this am   CONTRAST USED:  75cc of Isovue 370  FINDINGS:  LUNGS:  Multifocal dependent wedge-shaped areas of consolidation in lower lobes are noted and appears similar to previous study likely indicating chronic postinflammatory scar and atelectasis.  There is emphysematous change noted in upper lobes. VASCULATURE:  No visible pulmonary arterial thrombus or attenuation.  BASIL:  Right hilar lymph node measures 2.8 x 1.7 cm (previously 2.5 x 1.5 cm). MEDIASTINUM:  Low right paratracheal lymph node series 2, image 41 measures 2.2 x 1.4 cm (previously 2.2 x 1.5 cm). CARDIAC:  Metallic densities within the mitral valve are presumably from prior surgical reconstruction or replacement of the valve. PLEURA:  No mass or effusion.  THORACIC AORTA:  No aneurysm.  CHEST WALL:  Right internal jugular tunneled dialysis catheter is noted.  The tip of this is in SVC.  The entirety of the catheter is not included on the chest CT.  There is no acute abnormality associated with the catheter within the limits of this study.  LIMITED ABDOMEN:  Limited images of the upper abdomen are unremarkable.  BONES:  No bony lesion or fracture.             CONCLUSION:  1. Right hilar and right mediastinal lymph nodes have increased slightly in size and are probably reactive. 2. Wedge-shaped areas of density in the right lung are unchanged and probably a combination of chronic scar and atelectasis. 3. Residual prior mitral valve surgery is noted. 4. There is otherwise no acute CT finding.   LOCATION:  [facility]   Dictated by (CST): Adrian Blevins MD on 9/29/2024 at 5:05 PM     Finalized by (CST): Adrian Blevins MD on 9/29/2024 at 5:09 PM       XR CHEST AP PORTABLE  (CPT=71045)    Result Date: 9/29/2024  PROCEDURE:  XR CHEST AP PORTABLE  (CPT=71045)  TECHNIQUE:  AP chest radiograph was obtained.   COMPARISON:  [facility], XR, XR CHEST AP PORTABLE  (CPT=71045), 9/15/2024, 8:01 PM.  INDICATIONS:  fatigue,pain and oozing from port nausea and vomiting . seen yesterday for same symptoms  PATIENT STATED HISTORY: (As transcribed by Technologist)  Pt c/o fatigue and nausea. Hx of heart surgery.    FINDINGS:  Valve prosthesis noted.  Cardiomegaly with normal pulmonary vascularity.  Small right pleural effusion.  Right dialysis catheter tips in the SVC.  No evidence of pneumothorax.            CONCLUSION:  No lobar pneumonia or overt congestive failure.  Small right pleural effusion.   LOCATION:  [id number]      Dictated by (CST): Jj Conley MD on 9/29/2024 at 2:44 PM     Finalized by (CST): Jj Conley MD on 9/29/2024 at 2:44 PM       IR CENTRAL VENOUS ACCESS    Result Date: 9/20/2024            PROCEDURE:  IR CENTRAL VENOUS CATHETER INSERTION  COMPARISON:  None.  INDICATIONS:  Dialysis, end-stage renal disease  TECHNIQUE:  Prior to the procedure, I discussed with the patient and/or legal representative the potential benefits, risks, and side effects of this procedure, the likelihood of the patient achieving goals; and the potential problems that might occur during recuperation.  I discussed reasonable alternatives to the procedure, including risks, benefits, steps to prevent infection and side effects related to the alternatives, and risks related to not receiving this procedure. A witnessed verbal and signed consent was obtained and documented in the patient's chart.  IV was checked and maintained by the nurse. Moderate conscious sedation was performed under continuous pulse oximetry and cardiac monitoring under my direct supervision.  The radiology nurse was in attendance throughout the exam. Moderate conscious sedation of 4 mg Versed and 200 mcg of Fentanyl was given.  Patient was assessed and monitoring of oxygen saturation, heart rate, and blood pressure by the nursing staff and myself during the exam for a  total intraservice time of 30 minutes of conscious sedation time from 8:22 a.m. to 8:52 a.m..  2 grams of Ancef were given pre-procedurally via existing IV.  The patient was placed supine on the angiographic table and the right chest and neck was prepped and covered with a full body drape in the usual sterile fashion.  All operators present for the case performed standard pre-procedural prep including hand washing, sterile gloves, gown, mask, and cap.  All aspects of the maximum sterile barrier technique were followed.  A preprocedural time out was performed with all physicians, technologists, and nurses involved with the procedure. Sonography of the right  neck demonstrated included right internal jugular vein and a patent right external jugular vein and a permanent image was obtained.  Under sonographic guidance, the right external jugular vein was accessed using a micropuncture system.  A guidewire was advanced into the IVC under fluoroscopic guidance.  Using blunt dissection a dual lumen tunneled catheter was placed via a peel-away sheath.  Both lumens flushed and returned easily.  The catheter was secured and heparinized.  A small amount of Dermabond was placed at the neck venotomy site.   Sterile gauze and transparent dressing was applied.  The patient tolerated the procedure well and there were no immediate complications. Routine post tunneled catheter insertion instructions were communicated to the patient.  ESTIMATED BLOOD LOSS: Less than 5cc.  FLUORO TIME/DOSE: 0.2 minutes  AIR KERMA: 1.13 mGy  IMPRESSION: Successful placement of right external jugular vein tunneled dialysis catheter ( 23 cm tip to cuff Bard catheter).  The right internal jugular vein was occluded more centrally.  PLAN: The patient will follow-up with nephrology and at dialysis.  Catheter is ready for use, routine post-catheter care.  LOCATION:  [facility]    Dictated by (CST): Quinn Bernal MD on 9/20/2024 at 9:43 AM     Finalized by  (CST): Quinn eBrnal MD on 9/20/2024 at 9:44 AM       US VENOUS DOPPLER LEG LEFT - DIAG IMG (CPT=93971)    Result Date: 9/19/2024  PROCEDURE:  US VENOUS DOPPLER LEG LEFT - DIAG IMG (CPT=93971)  COMPARISON:  [facility], US DUPLEX SCAN HEMODIALYSIS ACCESS  (CPT=93990), 9/18/2024, 2:38 PM.  INDICATIONS:  Left calf pain  TECHNIQUE:  Real time, grey scale, and duplex ultrasound was used to evaluate the lower extremity venous system. B-mode two-dimensional images of the vascular structures, Doppler spectral analysis, and color flow.  Doppler imaging were performed.  The following veins were imaged:  Common, deep, and superficial femoral, popliteal, sapheno-femoral junction, posterior tibial veins, and the contralateral common femoral vein.  PATIENT STATED HISTORY: (As transcribed by Technologist)     FINDINGS:  EXTREMITY EXAMINED:  Left lower extremity SAPHENOFEMORAL JUNCTION:  No reflux. THROMBI:  None visible. COMPRESSION:  Normal compressibility, phasicity, and augmentation. OTHER:  Negative.            CONCLUSION:  Negative for deep venous thrombosis of the left lower extremity   LOCATION:  [facility]    Dictated by (CST): Claude Singh MD on 9/19/2024 at 10:06 AM     Finalized by (CST): Claude Singh MD on 9/19/2024 at 10:06 AM       US DUPLEX SCAN HEMODIALYSIS ACCESS  (CPT=93990)    Result Date: 9/18/2024  PROCEDURE:  US DUPLEX SCAN HEMODIALYSIS ACCESS  (CPT=93990)  COMPARISON:  None.  INDICATIONS:  LUE AVF access issue  TECHNIQUE:  Real time gray scale and duplex color Doppler sonography was used to evaluate the left upper extremity arterial and venous system. Being noted two-dimensional images of the vascular structures, Doppler spectral analysis, and color Doppler imaging were performed  PATIENT STATED HISTORY: (As transcribed by Technologist)     FINDINGS:   Left UPPER EXTREMITY ARTERIAL: Proximal brachial: diameter 8 mm, velocity 109 centimeters/sec Mid brachial: diameter 7 mm, velocity 77 centimeters/sec Distal  brachial: diameter 7 mm, velocity 83 centimeters/sec Proximal radial: diameter 6 mm, velocity 95 centimeters/sec Mid radial: diameter 6 mm, velocity 89 centimeters/sec Distal radial: diameter 6 mm, velocity 89 centimeters/sec Fistula/graft arterial anastomosis: diameter 7 mm, velocity 345 centimeters/sec Proximal graft: diameter 3 mm, velocity 380 centimeters/sec Mid graft: diameter 4 mm, velocity 74 centimeters/sec Distal graft: diameter 67 mm, velocity 4 centimeters/sec  Left UPPER EXTREMITY VENOUS: Proximal arm cephalic vein: diameter 2 mm, velocity 6 centimeters/sec Mid arm cephalic vein:  diameter 2 mm, velocity 9 centimeters/sec Distal arm cephalic vein: diameter 2 mm, velocity 5 centimeters/sec Prox basilic vein: diameter 7 mm, velocity 24 centimeters/sec Mid basilic vein: diameter 6 mm, velocity 44 centimeters/sec Distal basilic vein: diameter 6 mm, velocity 29 centimeters/sec            CONCLUSION:  Elevated velocity of the left upper extremity fistula at the anastomosis and proximal venous outflow is suggestive of stenosis in this region.   LOCATION:  [id number]    Dictated by (CST): Smith Randle MD on 9/18/2024 at 4:07 PM     Finalized by (CST): Smith Randle MD on 9/18/2024 at 4:12 PM       CT ABDOMEN+PELVIS(CPT=74176)    Result Date: 9/15/2024  PROCEDURE:  CT ABDOMEN+PELVIS (CPT=74176)  COMPARISON:  [facility], CT, CT ABDOMEN+PELVIS(CPT=74176), 8/30/2024, 9:57 AM.  INDICATIONS:  perla, abd pain  TECHNIQUE:  Unenhanced multislice CT scanning was performed from the dome of the diaphragm to the pubic symphysis.  Dose reduction techniques were used. Dose information is transmitted to the ACR (American College of Radiology) NRDR (National Radiology Data Registry) which includes the Dose Index Registry.  PATIENT STATED HISTORY: (As transcribed by Technologist)     FINDINGS:  LIVER:  No enlargement, atrophy, abnormal density, or significant focal lesion.  BILIARY:  No visible dilatation or calcification.  PANCREAS:  No  lesion, fluid collection, ductal dilatation, or atrophy.  SPLEEN:  No enlargement or focal lesion.  KIDNEYS:  No mass, obstruction, or calcification.  ADRENALS:  No mass or enlargement.  AORTA/VASCULAR:    Unremarkable as seen on non-contrast imaging. RETROPERITONEUM:  No mass or adenopathy.  BOWEL/MESENTERY:  There is diffuse diverticulosis of the colon.  There is no CT evidence of diverticulitis. ABDOMINAL WALL:  No mass or hernia.  URINARY BLADDER:  No visible focal wall thickening, lesion, or calculus.  PELVIC NODES:  No adenopathy.  PELVIC ORGANS:  No visible mass.  Pelvic organs appropriate for patient age.  BONES:  There is advanced degenerative disc disease in the lumbar spine. LUNG BASES:  Dependent atelectasis right lower lobe is noted.  There is a small right pleural effusion. OTHER:  Negative.             CONCLUSION:  1. A specific etiology for abdominal pain is not evident from this study. 2. There is diverticulosis of colon without CT evidence of diverticulitis. 3. Small right pleural effusion has decreased since prior study.    LOCATION:  [facility]   Dictated by (CST): Adrian Blevins MD on 9/15/2024 at 10:12 PM     Finalized by (CST): Adrian Blevins MD on 9/15/2024 at 10:15 PM       XR CHEST AP PORTABLE  (CPT=71045)    Result Date: 9/15/2024  PROCEDURE:  XR CHEST AP PORTABLE  (CPT=71045)  TECHNIQUE:  AP chest radiograph was obtained.  COMPARISON:  [facility] , XR, XR CHEST PA + LAT CHEST (VZL=61019), 9/02/2024, 7:58 AM.  [facility] , XR, XR CHEST AP PORTABLE  (CPT=71045), 8/30/2024, 11:23 PM.  INDICATIONS:  perla, abd pain  PATIENT STATED HISTORY: (As transcribed by Technologist)  Patient states he has difficulty in breathing and productive cough for 2 days. History of asthma and pneumonia.    FINDINGS:  There is interval improvement in consolidation right lower lobe consistent with improved right lower lobe pneumonia or atelectasis.  The remainder of the lungs is clear.  Heart size is within normal limits.   Mediastinum and elenita are unremarkable.  Right internal jugular tunneled dialysis catheter has been removed.            CONCLUSION:  1. There is interval removal of right tunneled dialysis catheter. 2. There is improvement in opacity right lung base consistent with approved atelectasis or pneumonia.    LOCATION:  [facility]      Dictated by (CST): Adrian Blevins MD on 9/15/2024 at 8:23 PM     Finalized by (CST): Adrian Blevins MD on 9/15/2024 at 8:24 PM       Operative Procedures:   Activity: activity as tolerated  Diet: regular diet  Wound Care: none needed  Code Status: Full Code  O2: none  Total Time Coordinating Care: 35 minutes Patient had opportunity to ask questions and state understand and agree with therapeutic plan as outlined    I reconciled current and discharge medications on day of discharge.

## 2024-10-08 NOTE — PROGRESS NOTES
[facility] Nephrology  Inpatient Follow-up    Godwin Fonseca Patient Status:  Observation    1980 MRN [id number]   [facility] 7NE-A Attending Vicky Weiss,    Hosp Day # 1 PCP Adrian Small MD       SUBJECTIVE:     Patient seen and examined at bedside. No acute complaints today.     MEDICATIONS:     Current Facility-Administered Medications   Medication Dose Route Frequency    hydrocortisone (Anusol-HC) 25 MG rectal suppository 25 mg  25 mg Rectal BID    albuterol (Ventolin HFA) 108 (90 Base) MCG/ACT inhaler 2 puff  2 puff Inhalation Q6H PRN    ARIPiprazole (Abilify) tab 15 mg  15 mg Oral Daily    buPROPion ER (Wellbutrin XL) 24 hr tab 150 mg  150 mg Oral Daily    carvedilol (Coreg) tab 25 mg  25 mg Oral BID with meals    fenofibrate micronized (Lofibra) cap 134 mg  134 mg Oral Nightly    FLUoxetine (PROzac) cap 40 mg  40 mg Oral Daily    hydrALAZINE (Apresoline) tab 50 mg  50 mg Oral BID    HYDROcodone-acetaminophen (Norco)  MG per tab 1 tablet  1 tablet Oral Q6H PRN    isosorbide mononitrate ER (Imdur) 24 hr tab 30 mg  30 mg Oral Daily    lamoTRIgine (LaMICtal) tab 150 mg  150 mg Oral Daily    levoFLOXacin (Levaquin) tab 500 mg  500 mg Oral Q48HR @ 0700    amphetamine-dextroamphetamine (Adderall) tab 15 mg  15 mg Oral BID@0800,1200    losartan (Cozaar) tab 100 mg  100 mg Oral Daily    memantine (Namenda) tab 5 mg  5 mg Oral BID    NIFEdipine ER (Procardia-XL) 24 hr tab 60 mg  60 mg Oral BID    sevelamer carbonate (Renvela) tab 800 mg  800 mg Oral TID CC    tamsulosin (Flomax) cap 0.4 mg  0.4 mg Oral Daily    umeclidinium bromide (Incruse Ellipta) 62.5 MCG/ACT inhaler 1 puff  1 puff Inhalation Daily    acetaminophen (Tylenol) tab 650 mg  650 mg Oral Q6H PRN    ondansetron (Zofran) 4 MG/2ML injection 4 mg  4 mg Intravenous Q6H PRN    pantoprazole (Protonix) 40 mg in sodium chloride 0.9% PF 10 mL IV push  40 mg Intravenous Q12H    sodium chloride 0.9 % IV bolus 100 mL  100  mL Intravenous Q30 Min PRN    And    albumin human (Albumin) 25% injection 25 g  25 g Intravenous PRN Dialysis    hydrALAzine (Apresoline) 20 mg/mL injection 10 mg  10 mg Intravenous Q6H PRN    ceFAZolin (Ancef) 1 g in dextrose 5% 100mL IVPB-ADD  1 g Intravenous Q24H    HYDROmorphone (Dilaudid) 1 MG/ML injection 0.2 mg  0.2 mg Intravenous Q2H PRN    Or    HYDROmorphone (Dilaudid) 1 MG/ML injection 0.4 mg  0.4 mg Intravenous Q2H PRN    Or    HYDROmorphone (Dilaudid) 1 MG/ML injection 0.8 mg  0.8 mg Intravenous Q2H PRN    Vancomycin: PHARMACY DOSING  1 each Intravenous See Admin Instructions (RX holding)    cloNIDine (Catapres) tab 0.1 mg  0.1 mg Oral BID       PHYSICAL EXAM:     Vital Signs: /72 (BP Location: Right arm)   Pulse 91   Temp 97.6 °F (36.4 °C) (Oral)   Resp 20   Ht 6' 2\" (1.88 m)   Wt 240 lb (108.9 kg)   SpO2 95%   BMI 30.81 kg/m²   Temp (24hrs), Av.6 °F (36.4 °C), Min:97.3 °F (36.3 °C), Max:97.8 °F (36.6 °C)       Intake/Output Summary (Last 24 hours) at 10/8/2024 1455  Last data filed at 10/8/2024 0457  Gross per 24 hour   Intake 700 ml   Output 3000 ml   Net -2300 ml     Wt Readings from Last 3 Encounters:   10/07/24 240 lb (108.9 kg)   24 245 lb (111.1 kg)   24 245 lb (111.1 kg)       General: no acute distress  HEENT: normocephalic, atraumatic  CV: RRR  Respiratory: no distress  Abdomen: soft, non-tender  Extremities: no edema bilaterally  Skin: warm, dry  Neuro: awake, alert     LABORATORY DATA:     Lab Results   Component Value Date     (H) 10/08/2024    BUN 42 (H) 10/08/2024    BUNCREA 13.0 2022    CREATSERUM 7.45 (H) 10/08/2024    ANIONGAP 11 10/08/2024    GFR 59 (L) 2018    GFRNAA 30 (L) 2022    GFRAA 35 (L) 2022    CA 9.1 10/08/2024    OSMOCALC 294 10/08/2024    ALKPHO 67 10/06/2024    AST 27 10/06/2024    ALT 7 (L) 10/06/2024    BILT 0.4 10/06/2024    TP 7.4 10/06/2024    ALB 4.5 10/08/2024    GLOBULIN 3.8 10/06/2024     (L)  10/08/2024    K 4.2 10/08/2024     10/08/2024    CO2 23.0 10/08/2024     Lab Results   Component Value Date    WBC 5.8 10/08/2024    RBC 2.95 (L) 10/08/2024    HGB 9.4 (L) 10/08/2024    HCT 28.3 (L) 10/08/2024    .0 10/08/2024    MPV 11.5 12/14/2012    MCV 95.9 10/08/2024    MCH 31.9 10/08/2024    MCHC 33.2 10/08/2024    RDW 14.2 10/08/2024    NEPRELIM 3.67 10/08/2024    NEPERCENT 63.9 10/08/2024    LYPERCENT 17.9 10/08/2024    MOPERCENT 13.0 10/08/2024    EOPERCENT 4.0 10/08/2024    BAPERCENT 0.9 10/08/2024    NE 3.67 10/08/2024    LYMABS 1.03 10/08/2024    MOABSO 0.75 10/08/2024    EOABSO 0.23 10/08/2024    BAABSO 0.05 10/08/2024     Lab Results   Component Value Date    MALBP 167.00 05/24/2023    CREUR 142.00 05/24/2023    CREUR 142.00 05/24/2023     Lab Results   Component Value Date    COLORUR Colorless (A) 09/16/2024    CLARITY Clear 09/16/2024    SPECGRAVITY 1.012 09/16/2024    GLUUR Normal 09/16/2024    BILUR Negative 09/16/2024    KETUR Negative 09/16/2024    BLOODURINE 3+ (A) 09/16/2024    PHURINE 6.0 09/16/2024    PROUR 50 (A) 09/16/2024    UROBILINOGEN Normal 09/16/2024    NITRITE Negative 09/16/2024    LEUUR Negative 09/16/2024    WBCUR 1-5 09/16/2024    RBCUR 0-2 09/16/2024    EPIUR Few (A) 09/16/2024    BACUR None Seen 09/16/2024    CAOXUR Occasional (A) 04/20/2018    HYLUR Present (A) 05/14/2019       IMAGING:     Reviewed    ASSESSMENT:      # ESRD on HD  -MWF schedule outpatient  -[facility]  -L AVF (not mature)  -R TDC (placed 9/20/2024)     # HTN/Volume Status     # Anemia     # Secondary Hyperparathyroidism     PLAN:      -HD per MWF schedule   -UF with HD as tolerated  -Continue home BP medications - change clonidine to 0.1mg BID to avoid rebound  -Defer OWEN in setting of high BPs  -Continue sevelamer  -GI recommendations for bleeding.   -Continue antibiotics for catheter associated infection per ID  -Avoid nephrotoxins and renally dose medications for creatinine  clearance  -Monitor intake and output daily  -Daily weights        Okay for discharge from nephrology perspective. If still admitted, will dialyze tomorrow.     Thank you for allowing us to participate in the care of this patient.       Samantha Muñoz, DO [facility] - Nephrology

## 2024-10-08 NOTE — PLAN OF CARE
NURSING DISCHARGE NOTE    Discharged Home via Wheelchair.  Accompanied by RN  Belongings Taken by patient/family.

## 2024-10-08 NOTE — CM/SW NOTE
Patient failed inpatient criteria. Second level of review completed and supports observation. UR committee in agreement. Discussed with Dr. Weiss who approves observation status. Observation order written, will follow for signature.  EM jose c.  TI provided to patient.  Jackie CORNELL RN, 10/08/24, 12:39 PM

## 2024-10-08 NOTE — PLAN OF CARE
Assumed care at 0700  Patient alert, oriented x4  Norco given for pain  Ambulating independently  No BM    All consults cleared for discharge. AVS reviewed with patient. All questions answered    NURSING DISCHARGE NOTE    Discharged Home via Wheelchair.  Accompanied by Support staff  Belongings Taken by patient/family.

## 2024-10-08 NOTE — CM/SW NOTE
This is a Code 44. Patient failed inpatient criteria. Second level of review completed and supports observation.  UR committee in agreement. Discussed with Dr. Weiss   who approves observation status.  Observation order placed. MOON to be given   to the patient and signed copy placed in their chart.

## 2024-10-08 NOTE — PAYOR COMM NOTE
--------------OBSERVATION STATUS        ADMISSION REVIEW     Payor: [employer]  Subscriber #:  [id number]  Authorization Number: [id number]    Admit date: 10/7/24  Admit time:  1:12 AM       REVIEW DOCUMENTATION:    Patient Seen in: [place] [facility] [employer]    Chief Complaint   Patient presents with    GI Bleeding    Difficulty Breathing     Stated Complaint: Rectal bleeding, sob    At [facility]'s last month with diverticular bleedingan dneeded 3 units of blood/ may be 4- rectal bleeding started again yesterday and left sided abdominal pain started today  Not on blood thnners  Hospitalized beginning of this month for infected dialysis catheter and was hospitalized - still on antibotics  Dialysis as outpatiet with antbiotics as well   Lightheaded, no chest pain  Diarrhea since hospitalized on the antibiotics    Past Medical History:    Asthma (HCC)    Attention deficit hyperactivity disorder (ADHD)    Back problem    Bipolar 1 disorder (HCC)    CKD (chronic kidney disease) stage 3, GFR 30-59 ml/min ([facility])    Dr Meeks    Congestive heart disease ([facility])    COPD (chronic obstructive pulmonary disease) ([facility])    Coronary atherosclerosis    Deep vein thrombosis ([facility])    at age 17 R/T cast    Depression    Diabetes ([facility])    Essential hypertension    3/21 echo: severe concentric LVH with normal EF and no MR or pHTN    Extrinsic asthma, unspecified    Heart attack (HCC)    2016- angiogram- no intervention    Heart valve disease    mitral valve repair in 1994/    High blood pressure    High cholesterol    History of blood transfusion    History of mitral valve repair    Hyperlipidemia    Low back pain    tight and stiff after sweeping and mopping    LVH (left ventricular hypertrophy)    Migraines    Mixed hyperlipidemia     HDL 38 LDL 97 VLDL 57     Monoclonal gammopathy    IgG kappa     MVP (mitral valve prolapse)    Repair 1994 at [facility]; echoes as recently as 3/21 show mild or trivial  MR and no stenosis    Neuropathy    Osteoarthritis    hip ,knees    Pneumonia due to organism    Pulmonary embolism (HCC)    Renal disorder    Stroke (HCC)    TIA (transient ischemic attack)    Initial history of left-sided weakness and slurred speech. (+) cocaine. MRI of the brain, CT angiogram of the head and neck, and 2D echo are all unremarkable.     TMJ (dislocation of temporomandibular joint)    Troponin level elevated    Trop 60 60 47 with TIA and no CP: Lexiscan negative with EF 51     Past Surgical History:   Procedure Laterality Date    Av fistula revision, open Left     Colonoscopy N/A 03/26/2023    Procedure: COLONOSCOPY;  Surgeon: Heath Vu MD;  Location:  ENDOSCOPY    Colonoscopy N/A 12/30/2023    Procedure: COLONOSCOPY with cold snare polypectomy and forcep polypectomy;  Surgeon: Ousmane Suarez MD;  Location:  ENDOSCOPY    Colonoscopy & polypectomy  2019    Egd  2019    Duodenitis. Biopsied. EUS for weight loss was negative    Heart surgery      Hernia surgery  08/17/2022    Dr Barnes    Laminectomy,>2 sgmt,lumbar  09/06/2018    L4-L5 Decomp Discectomy ROEM L4-L5    Mitralplasty w cp bypass  1994    [facility]: Repair    Repair rotator cuff,chronic Left     torn and had a ruptured bicep    Valve repair  1994    mitral valve       Physical Exam     ED Triage Vitals [10/06/24 2006]   BP (!) 177/95   Pulse 101   Resp 22   Temp 98.1 °F (36.7 °C)   Temp src    SpO2 97 %   O2 Device None (Room air)       Current Vitals:   Vital Signs  BP: (!) 200/127  Pulse: 97  Resp: 21  Temp: 98.1 °F (36.7 °C)    Oxygen Therapy  SpO2: 99 %  O2 Device: None (Room air)      Physical Exam  Vitals and nursing note reviewed.   Constitutional:       General: He is not in acute distress.     Appearance: He is well-developed. He is obese. He is not toxic-appearing or diaphoretic.      Comments: Rubbing his left side of his abdomen intermittently moaning stating that he has severe pain in his abdomen.   HENT:      Head:  Atraumatic.      Right Ear: External ear normal.      Left Ear: External ear normal.      Nose: Nose normal.   Eyes:      General: No scleral icterus.        Right eye: No discharge.         Left eye: No discharge.      Conjunctiva/sclera: Conjunctivae normal.      Pupils: Pupils are equal, round, and reactive to light.   Neck:      Vascular: No JVD.   Cardiovascular:      Rate and Rhythm: Normal rate and regular rhythm.      Heart sounds: Normal heart sounds. No murmur heard.     No friction rub. No gallop.   Pulmonary:      Effort: Pulmonary effort is normal. No respiratory distress.      Breath sounds: Normal breath sounds. No stridor. No wheezing.   Chest:      Chest wall: No tenderness.   Abdominal:      General: Bowel sounds are normal. There is no distension.      Palpations: Abdomen is soft. There is no mass.      Tenderness: There is abdominal tenderness. There is no guarding or rebound.      Comments: Left abdomen   Genitourinary:     Rectum: Guaiac result negative.      Comments: Possibly just trace positive but no BRB noted at all on glove ad no stool in the vault  Musculoskeletal:         General: No tenderness or deformity. Normal range of motion.      Cervical back: Normal range of motion and neck supple.   Lymphadenopathy:      Cervical: No cervical adenopathy.   Skin:     General: Skin is warm and dry.      Coloration: Skin is not pale.      Findings: No erythema or rash.   Neurological:      General: No focal deficit present.      Mental Status: He is alert and oriented to person, place, and time.      Cranial Nerves: No cranial nerve deficit.      Coordination: Coordination normal.   Psychiatric:         Behavior: Behavior normal.         Judgment: Judgment normal.     ED Course     Labs Reviewed   CBC WITH DIFFERENTIAL WITH PLATELET - Abnormal; Notable for the following components:       Result Value    RBC 2.80 (*)     HGB 9.0 (*)     HCT 26.9 (*)     Monocyte Absolute 1.04 (*)     All other  components within normal limits   COMP METABOLIC PANEL (14) - Abnormal; Notable for the following components:    Glucose 207 (*)     BUN 67 (*)     Creatinine 9.13 (*)     Calcium, Total 7.7 (*)     Calculated Osmolality 309 (*)     eGFR-Cr 7 (*)     ALT 7 (*)     A/G Ratio 0.9 (*)     All other components within normal limits   C. DIFFICILE(TOXIGENIC)PCR   OCCULT BLOOD, STOOL   STOOL CULTURE W/SHIGATOXIN     ED Course as of 10/07/24 0012  ------------------------------------------------------------  Time: 10/06 2053  Value: Hemoglobin(!): 9.0  Comment: (Reviewed)  ------------------------------------------------------------  Time: 10/06 2102  Comment: Capsule study is not until peggy- says a GI doctor told him his colon had pockets all over his colon- Like an 77 yo Man       CT ABDOMEN+PELVIS(CONTRAST ONLY)(CPT=74177)   CONCLUSION:         1. Diverticulosis of the colon without ends of diverticulitis.         2. Bladder wall thickening can be seen with cystitis.         Disposition and Plan     Clinical Impression:  1. Diverticulosis of colon with hemorrhage    2. ESRD (end stage renal disease) ([facility])    3. Type 2 diabetes mellitus with chronic kidney disease on chronic dialysis, without long-term current use of insulin ([facility])    4. Coronary artery disease involving native coronary artery of native heart without angina pectoris         Disposition:  Admit  10/7/2024 12:00 am    Signed by Deanna Cervantes MD on 10/7/2024 12:12 AM         HISTORY AND PHYSICAL      Assessment/Plan:   44 year old male with PMH sig for Diverticulosis, ESRD on HD, diastolic HF, HTN who presents for rectal bleeding.     # Diverticular bleed suspected  # Diverticulosis  # Acute on chronic anemia  -CT A/P significant for colonic diverticulosis without diverticulitis.  -Hemoglobin 8.5 on admission, baseline ~9-10.  Trend CBC, transfuse as needed.  -Pain control as needed, noted to possibly have SUDD component of diverticular disease in  previous admission  -Keep n.p.o.for now  -GI consulted     # HD catheter line site infection  -Cont IV vanc/ ancef as per ID from previous encounter for cellulitis at HD cath site     #Prostatitis  -Cont Levaquin course for previously diagnosed prostatitis      # ESRD on HD  # Chronic diastolic heart failure  -Nephrology consulted   -Optimize volume status with hemodialysis     # Hypertension  -Continue losartan, Imdur, nifedipine, Coreg, clonidine     #Depression  #Bipolar disorder  -Continue bupropion, Abilify fluoxetine, Lamictal         GI CONSULT    ABDOMEN: Bowel sounds normoactive. Soft, no organomegaly or masses appreciated. Nontender.   EXTREMITIES: Without cyanosis. No peripheral edema appreciated.   RECTAL: Deferred.   NEURO: Motor and Gait grossly intact. Alert and Oriented x 3.    ASSESSMENT AND PLAN:   Acute on chronic anemia  Chronic rectal bleeding  End-stage renal disease on hemodialysis     Recommendations  - No active bleeding at this time, recommend follow up with the established gastroenterologist for outpatient capsule endoscopy of the small bowel ( Dr [facility])  - Recommend outpatient follow-up with surgery for hemorrhoidectomy   - regular diet   - already had EGD and colonoscopy with Dr Gallardo. No plans for repeat EGD and colonoscopy       MEDICATIONS ADMINISTERED IN LAST 1 DAY:  amphetamine-dextroamphetamine (Adderall) tab 15 mg       Date Action Dose Route User    10/7/2024 1247 Given 15 mg Oral Laurel Carreon RN    10/7/2024 0823 Given 15 mg Oral Colleen Vasquez RN          buPROPion ER (Wellbutrin XL) 24 hr tab 150 mg       Date Action Dose Route User    10/7/2024 0825 Given 150 mg Oral Colleen Vasquez RN          carvedilol (Coreg) tab 25 mg       Date Action Dose Route User    10/7/2024 1725 Given 25 mg Oral Laurel Carreon RN    10/7/2024 0825 Given 25 mg Oral Colleen Vasquez RN          ceFAZolin (Ancef) 1 g in dextrose 5% 100mL IVPB-ADD       Date Action Dose Route  User    10/7/2024 1620 New Bag 1 g Intravenous Colleen Vasquez RN          cloNIDine (Catapres) tab 0.1 mg       Date Action Dose Route User    10/7/2024 2000 Given 0.1 mg Oral Colleen Vasquez RN          fenofibrate micronized (Lofibra) cap 134 mg       Date Action Dose Route User    10/7/2024 2000 Given 134 mg Oral Colleen Vasquez RN          FLUoxetine (PROzac) cap 40 mg       Date Action Dose Route User    10/7/2024 0824 Given 40 mg Oral Colleen Vasquez RN          heparin (Porcine) 1000 UNIT/ML injection 1,500 Units       Date Action Dose Route User    10/7/2024 1531 Given 1,500 Units Intracatheter Colleen Vasquez RN          hydrALAzine (Apresoline) 20 mg/mL injection 10 mg       Date Action Dose Route User    10/7/2024 0545 Given 10 mg Intravenous Prisca Mukherjee RN          hydrALAZINE (Apresoline) tab 50 mg       Date Action Dose Route User    10/7/2024 2000 Given 50 mg Oral Colleen Vasquez RN    10/7/2024 1551 Given 50 mg Oral Laurel Carreon RN          HYDROcodone-acetaminophen (Norco)  MG per tab 1 tablet       Date Action Dose Route User    10/8/2024 0443 Given 1 tablet Oral Michelle Bailey RN          HYDROmorphone (Dilaudid) 1 MG/ML injection 0.8 mg       Date Action Dose Route User    10/8/2024 0210 Given 0.8 mg Intravenous Michelle Bailey RN    10/7/2024 2236 Given 0.8 mg Intravenous Colleen Vasquez RN    10/7/2024 1900 Given 0.8 mg Intravenous Laurel Carreon RN    10/7/2024 1531 Given 0.8 mg Intravenous Colleen Vasquez RN    10/7/2024 1250 Given 0.8 mg Intravenous Laurel Carreon RN    10/7/2024 1036 Given 0.8 mg Intravenous Laurel Carreon RN    10/7/2024 0827 Given 0.8 mg Intravenous Colleen Vasquez RN          isosorbide mononitrate ER (Imdur) 24 hr tab 30 mg       Date Action Dose Route User    10/7/2024 1551 Given 30 mg Oral Laurel Carreon, RN          levoFLOXacin (Levaquin) tab 500 mg       Date Action Dose Route User    10/7/2024 0825 Given 500 mg Oral Colleen Vasquez, RN           losartan (Cozaar) tab 100 mg       Date Action Dose Route User    10/7/2024 1551 Given 100 mg Oral Laurel Carreon RN          memantine (Namenda) tab 5 mg       Date Action Dose Route User    10/7/2024 2000 Given 5 mg Oral Colleen Vasquez RN    10/7/2024 0822 Given 5 mg Oral Colleen Vasquez RN          NIFEdipine ER (Procardia-XL) 24 hr tab 60 mg       Date Action Dose Route User    10/7/2024 2000 Given 60 mg Oral Colleen Vasquez RN    10/7/2024 1551 Given 60 mg Oral Laurel Carreon RN          ondansetron (Zofran) 4 MG/2ML injection 4 mg       Date Action Dose Route User    10/7/2024 2019 Given 4 mg Intravenous Colleen Vasquez RN          pantoprazole (Protonix) 40 mg in sodium chloride 0.9% PF 10 mL IV push       Date Action Dose Route User    10/7/2024 2001 Given 40 mg Intravenous Colleen Vasquez RN    10/7/2024 0826 Given 40 mg Intravenous Colleen Vasquez RN          sevelamer carbonate (Renvela) tab 800 mg       Date Action Dose Route User    10/7/2024 1725 Given 800 mg Oral Laurel Carreon RN    10/7/2024 1036 Given 800 mg Oral Laurel Carreon RN          tamsulosin (Flomax) cap 0.4 mg       Date Action Dose Route User    10/7/2024 0825 Given 0.4 mg Oral Colleen Vasquez RN          umeclidinium bromide (Incruse Ellipta) 62.5 MCG/ACT inhaler 1 puff       Date Action Dose Route User    10/7/2024 0744 Given 1 puff Inhalation Zhao Mustafa, AZALEA          vancomycin (Vancocin) 1,000 mg in sodium chloride 0.9% 250 mL IVPB-ADDV       Date Action Dose Route User    10/7/2024 1721 New Bag 1,000 mg Intravenous Luarel Carreon RN          ARIPiprazole (Abilify) tab 15 mg       Date Action Dose Route User    10/7/2024 0825 Given 15 mg Oral Colleen Vasquez RN          lamoTRIgine (LaMICtal) tab 150 mg       Date Action Dose Route User    10/7/2024 0822 Given 150 mg Oral Colleen Vasquez, RN            Vitals (last day)       Date/Time Temp Pulse Resp BP SpO2 Weight O2 Device O2 Flow Rate (L/min) [facility]    10/08/24 0457 97.6 °F  (36.4 °C) 88 17 121/64 100 % -- None (Room air) --     10/07/24 2342 97.4 °F (36.3 °C) 99 26 122/77 98 % -- None (Room air) --     10/07/24 1948 97.8 °F (36.6 °C) 97 16 156/111 96 % -- None (Room air) --     10/07/24 0755 -- 98 13 -- 93 % -- -- --     10/07/24 0754 97.9 °F (36.6 °C) 100 24 189/121 99 % -- None (Room air) 0 L/min     10/07/24 0753 -- 100 22 -- 99 % -- -- --     10/07/24 0752 -- 101 17 -- 100 % -- -- --     10/07/24 0751 -- 104 32 -- 100 % -- -- --     10/07/24 0750 -- 96 33 -- 99 % -- -- --     10/07/24 0749 -- 98 29 -- 100 % -- -- --     10/07/24 0630 -- 102 20 189/122 -- -- -- -- AB    10/07/24 0540 -- 98 22 190/122 -- -- -- -- AB    10/07/24 0127 97.7 °F (36.5 °C) 90 24 190/123 93 % -- None (Room air) -- AT    10/07/24 0015 -- 96 21 163/105 99 % -- None (Room air) -- OSCAR

## 2024-10-08 NOTE — DIETARY NOTE
Allopurinol refill.  Reviewed labs this spring and GFR 64.  Will send 1 refill in and patient should get labs rechecked at annual because may be decrease to 50 daily pending kidney function.     Yojana Marquez MD     [facility]   part of [facility]   CLINICAL NUTRITION    Godwin Fonseca     Admitting diagnosis:  Diverticulosis of colon with hemorrhage [K57.31]  ESRD (end stage renal disease) (HCC) [N18.6]  Coronary artery disease involving native coronary artery of native heart without angina pectoris [I25.10]  Type 2 diabetes mellitus with chronic kidney disease on chronic dialysis, without long-term current use of insulin (HCC) [E11.22, N18.6, Z99.2]    Ht: 188 cm (6' 2\")  Wt: 108.9 kg (240 lb).   Body mass index is 30.81 kg/m².  IBW: 86.4 kg    Wt Readings from Last 6 Encounters:   10/07/24 108.9 kg (240 lb)   09/29/24 111.1 kg (245 lb)   09/28/24 111.1 kg (245 lb)   09/16/24 111.1 kg (245 lb)   09/02/24 112.7 kg (248 lb 8 oz)   08/30/24 113.4 kg (250 lb)        Labs/Meds reviewed    Diet:       Procedures    Regular/General diet Is Patient on Accuchecks? No     Percent Meals Eaten (last 3 days)       Date/Time Percent Meals Eaten (%)    10/07/24 1000 100 %    10/07/24 2000 75 %            Pt chart reviewed d/t MST.  Patient reports fair appetite at this time.  Nursing notes reports Percent Meals Eaten (%): 75 % intake for last meal.  Tolerating po diet without diarrhea, emesis, or constipation.   No significant weight changes noted.     PMH includes Bipolar D/O, HLD, HTN, TIA, ESRD on HD, DM, HF. Pt seen for MST. Pt seen lying in bed, reports his appetite is fair.  Has had poor appetite for several weeks, but feels it's a little better currently. Was able to eat a little for breakfast this AM.  Continues to c/o abdominal pain.  No n/v. Last BM 10/6 . Pt appears well nourished per visual exam.  Pt states he feels he has lost about 20# in the past several months since his appetite has decreased. Per EMR - wt has been stable.  Pt drinks Ensure, and would like to have some while in the hospital.  States his Phos has been on the higher side, but has been forgetting to take his binders prior to meal times.  Pt is without  questions at present.    Patient is at low nutrition risk at this time.    Please consult if patient status changes or nutrition issues arise.    Magui Eagle RD, LDN, [facility]  Clinical Dietitian  Phone [phone]

## 2024-10-08 NOTE — PROGRESS NOTES
[facility]    Godwin Fonseca Patient Status:  Inpatient    1980 MRN [id number]   Location [facility] 7NE-A Attending Vicky Weiss, DO   Hosp Day # 1 PCP Adrian Small MD     Godwin Fonseca is a 44 year old male patient. HGB stable at 9.4. No nausea or vomiting. Has some cramps in abdomen. No rectal bleeding here in hospital     1. Diverticulosis of colon with hemorrhage    2. ESRD (end stage renal disease) ([facility])    3. Type 2 diabetes mellitus with chronic kidney disease on chronic dialysis, without long-term current use of insulin ([facility])    4. Coronary artery disease involving native coronary artery of native heart without angina pectoris        Past Medical History:    Asthma ([facility])    Attention deficit hyperactivity disorder (ADHD)    Back problem    Bipolar 1 disorder ([facility])    CKD (chronic kidney disease) stage 3, GFR 30-59 ml/min ([facility])    Dr Meeks    Congestive heart disease ([facility])    COPD (chronic obstructive pulmonary disease) ([facility])    Coronary atherosclerosis    Deep vein thrombosis ([facility])    at age 17 R/T cast    Depression    Diabetes ([facility])    Essential hypertension    3/21 echo: severe concentric LVH with normal EF and no MR or pHTN    Extrinsic asthma, unspecified    Heart attack ([facility])    - angiogram- no intervention    Heart valve disease    mitral valve repair in /    High blood pressure    High cholesterol    History of blood transfusion    History of mitral valve repair    Hyperlipidemia    Low back pain    tight and stiff after sweeping and mopping    LVH (left ventricular hypertrophy)    Migraines    Mixed hyperlipidemia     HDL 38 LDL 97 VLDL 57     Monoclonal gammopathy    IgG kappa     MVP (mitral valve prolapse)    Repair  at [place]; echoes as recently as 3/21 show mild or trivial MR and no stenosis    Neuropathy    Osteoarthritis    hip ,knees    Pneumonia due to organism    Pulmonary embolism (HCC)    Renal disorder    Stroke ([facility])    TIA (transient  ischemic attack)    Initial history of left-sided weakness and slurred speech. (+) cocaine. MRI of the brain, CT angiogram of the head and neck, and 2D echo are all unremarkable.     TMJ (dislocation of temporomandibular joint)    Troponin level elevated    Trop 60 60 47 with TIA and no CP: Lexiscan negative with EF 51       No current outpatient medications on file.     Allergies   Allergen Reactions    Hydrochlorothiazide RASH and HIVES     Principal Problem:    Diverticulosis of colon with hemorrhage  Active Problems:    ESRD (end stage renal disease) ([facility])    Type 2 diabetes mellitus with chronic kidney disease on chronic dialysis, without long-term current use of insulin ([facility])    Coronary artery disease involving native coronary artery of native heart without angina pectoris    Blood pressure 105/66, pulse 97, temperature 97.3 °F (36.3 °C), temperature source Oral, resp. rate 13, height 6' 2\" (1.88 m), weight 240 lb (108.9 kg), SpO2 95%.    ROS  GENERAL: well developed, well nourished, in no apparent distress  HEENT: normocephalic; normal nose, pharynx and TM's  EYES: PERRLA, EOMI, sclera anicteric, conjunctiva normal; fundi normal  NECK: supple, FROM, no nodes, no JVD, no thyromegaly, no carotid bruits  RESPIRATORY: clear to percussion and auscultation  CARDIOVASCULAR: S1, S2 normal, RRR; no S3, no S4; no click; murmur negative  ABDOMEN: normal, active bowel sounds, no masses, HSM or tenderness  RECTAL: ---  EXTREMITIES: no cyanosis, clubbing or edema, peripheral pulses intact  PSYCHIATRIC: alert, oriented times 3      Physical Exam  GENERAL: Well developed, well nourished, in no obvious distress.   ABDOMEN: Bowel sounds normoactive. Soft, no organomegaly or masses appreciated. Nontender.   EXTREMITIES: Without cyanosis. No peripheral edema appreciated.   RECTAL: Deferred.   NEURO: Motor and Gait grossly intact. Alert and Oriented x 3.      ASSESSMENT   Acute on chronic anemia: STABLE   Chronic rectal  bleeding: FROM INTERNAL HEMORRHOIDS   End-stage renal disease on hemodialysis     Recommendations  - No active bleeding at this time, recommend follow up with the established gastroenterologist for outpatient capsule endoscopy of the small bowel ( Dr [facility])  - Recommend outpatient follow-up with surgery for hemorrhoidectomy   - regular diet   - Anusol suppository every night for 30 days   - already had EGD and colonoscopy with Dr Gallardo. No plans for repeat EGD and colonoscopy   - GI will sign off. Please call if any questions or concerns     Elliott Shane DO  10/8/2024

## 2024-10-08 NOTE — PLAN OF CARE
Assumed care at 0700  Patient alert, oriented x4  Dilaudid given for abd pain  No BM today  Zofran given x1 for nausea  HD completed  IV abx  Tolerating low fat diet    Plan of care discussed with patient

## 2024-10-10 NOTE — PAYOR COMM NOTE
--------------  DISCHARGE REVIEW    Payor: [employer]  Subscriber #:  [id number]  Authorization Number: [id number]    Admit date: 10/7/24  Admit time:   1:12 AM  Discharge Date: 10/8/2024  4:13 PM   Admit Orders (From admission, onward)       Start     Ordered    10/08/24 1112  Place in observation Once  Once        Ordering Provider: Vicky Weiss DO   Question:  Diagnosis  Answer:  Diverticulosis of colon with hemorrhage    10/08/24 1112               Admitting Physician: Vicky Weiss DO  Attending Physician:  No att. providers found  Primary Care Physician: Adrian Arce MD

## 2024-10-17 ENCOUNTER — HOSPITAL ENCOUNTER (INPATIENT)
Facility: HOSPITAL | Age: 44
LOS: 5 days | Discharge: HOME | End: 2024-10-22
Attending: EMERGENCY MEDICINE | Admitting: HOSPITALIST
Payer: MEDICARE

## 2024-10-17 ENCOUNTER — APPOINTMENT (OUTPATIENT)
Dept: GENERAL RADIOLOGY | Age: 44
End: 2024-10-17
Attending: EMERGENCY MEDICINE
Payer: MEDICARE

## 2024-10-17 DIAGNOSIS — E87.5 HYPERKALEMIA: ICD-10-CM

## 2024-10-17 DIAGNOSIS — N18.6 ESRD (END STAGE RENAL DISEASE) ON DIALYSIS (HCC): ICD-10-CM

## 2024-10-17 DIAGNOSIS — M54.2 NECK PAIN: ICD-10-CM

## 2024-10-17 DIAGNOSIS — J18.9 PNEUMONIA OF RIGHT LOWER LOBE DUE TO INFECTIOUS ORGANISM: Primary | ICD-10-CM

## 2024-10-17 DIAGNOSIS — I16.1 HYPERTENSIVE EMERGENCY: ICD-10-CM

## 2024-10-17 DIAGNOSIS — Z99.2 ESRD (END STAGE RENAL DISEASE) ON DIALYSIS (HCC): ICD-10-CM

## 2024-10-17 LAB
ADENOVIRUS PCR:: NOT DETECTED
ALBUMIN SERPL-MCNC: 3.4 G/DL (ref 3.4–5)
ALBUMIN/GLOB SERPL: 0.9 {RATIO} (ref 1–2)
ALP LIVER SERPL-CCNC: 70 U/L
ALT SERPL-CCNC: 10 U/L
ANION GAP SERPL CALC-SCNC: 12 MMOL/L (ref 0–18)
AST SERPL-CCNC: 33 U/L (ref 15–37)
ATRIAL RATE: 99 BPM
B PARAPERT DNA SPEC QL NAA+PROBE: NOT DETECTED
B PERT DNA SPEC QL NAA+PROBE: NOT DETECTED
BASOPHILS # BLD AUTO: 0.07 X10(3) UL (ref 0–0.2)
BASOPHILS NFR BLD AUTO: 0.9 %
BILIRUB SERPL-MCNC: 0.4 MG/DL (ref 0.1–2)
BUN BLD-MCNC: 75 MG/DL (ref 9–23)
C PNEUM DNA SPEC QL NAA+PROBE: NOT DETECTED
CALCIUM BLD-MCNC: 7.8 MG/DL (ref 8.5–10.1)
CHLORIDE SERPL-SCNC: 105 MMOL/L (ref 98–112)
CO2 SERPL-SCNC: 20 MMOL/L (ref 21–32)
CORONAVIRUS 229E PCR:: NOT DETECTED
CORONAVIRUS HKU1 PCR:: NOT DETECTED
CORONAVIRUS NL63 PCR:: NOT DETECTED
CORONAVIRUS OC43 PCR:: NOT DETECTED
CREAT BLD-MCNC: 10.7 MG/DL
EGFRCR SERPLBLD CKD-EPI 2021: 5 ML/MIN/1.73M2 (ref 60–?)
EOSINOPHIL # BLD AUTO: 0.33 X10(3) UL (ref 0–0.7)
EOSINOPHIL NFR BLD AUTO: 4.4 %
ERYTHROCYTE [DISTWIDTH] IN BLOOD BY AUTOMATED COUNT: 15.4 %
FLUAV RNA SPEC QL NAA+PROBE: NOT DETECTED
FLUBV RNA SPEC QL NAA+PROBE: NOT DETECTED
GLOBULIN PLAS-MCNC: 3.6 G/DL (ref 2.8–4.4)
GLUCOSE BLD-MCNC: 128 MG/DL (ref 70–99)
HCT VFR BLD AUTO: 27.7 %
HGB BLD-MCNC: 8.7 G/DL
IMM GRANULOCYTES # BLD AUTO: 0.03 X10(3) UL (ref 0–1)
IMM GRANULOCYTES NFR BLD: 0.4 %
LACTATE SERPL-SCNC: 0.8 MMOL/L (ref 0.4–2)
LYMPHOCYTES # BLD AUTO: 1.17 X10(3) UL (ref 1–4)
LYMPHOCYTES NFR BLD AUTO: 15.5 %
MCH RBC QN AUTO: 32.1 PG (ref 26–34)
MCHC RBC AUTO-ENTMCNC: 31.4 G/DL (ref 31–37)
MCV RBC AUTO: 102.2 FL
METAPNEUMOVIRUS PCR:: NOT DETECTED
MONOCYTES # BLD AUTO: 0.87 X10(3) UL (ref 0.1–1)
MONOCYTES NFR BLD AUTO: 11.5 %
MYCOPLASMA PNEUMONIA PCR:: NOT DETECTED
NEUTROPHILS # BLD AUTO: 5.08 X10 (3) UL (ref 1.5–7.7)
NEUTROPHILS # BLD AUTO: 5.08 X10(3) UL (ref 1.5–7.7)
NEUTROPHILS NFR BLD AUTO: 67.3 %
OSMOLALITY SERPL CALC.SUM OF ELEC: 308 MOSM/KG (ref 275–295)
P AXIS: 43 DEGREES
P-R INTERVAL: 170 MS
PARAINFLUENZA 1 PCR:: NOT DETECTED
PARAINFLUENZA 2 PCR:: NOT DETECTED
PARAINFLUENZA 3 PCR:: NOT DETECTED
PARAINFLUENZA 4 PCR:: NOT DETECTED
PLATELET # BLD AUTO: 157 10(3)UL (ref 150–450)
PLATELETS.RETICULATED NFR BLD AUTO: 3 % (ref 0–7)
POTASSIUM SERPL-SCNC: 5.7 MMOL/L (ref 3.5–5.1)
PROT SERPL-MCNC: 7 G/DL (ref 6.4–8.2)
Q-T INTERVAL: 390 MS
QRS DURATION: 94 MS
QTC CALCULATION (BEZET): 500 MS
R AXIS: 31 DEGREES
RBC # BLD AUTO: 2.71 X10(6)UL
RHINOVIRUS/ENTERO PCR:: NOT DETECTED
RSV RNA SPEC QL NAA+PROBE: NOT DETECTED
SARS-COV-2 RNA NPH QL NAA+NON-PROBE: NOT DETECTED
SODIUM SERPL-SCNC: 137 MMOL/L (ref 136–145)
T AXIS: 65 DEGREES
VENTRICULAR RATE: 99 BPM
WBC # BLD AUTO: 7.6 X10(3) UL (ref 4–11)

## 2024-10-17 PROCEDURE — 71045 X-RAY EXAM CHEST 1 VIEW: CPT | Performed by: EMERGENCY MEDICINE

## 2024-10-17 PROCEDURE — 36415 COLL VENOUS BLD VENIPUNCTURE: CPT

## 2024-10-17 PROCEDURE — 85025 COMPLETE CBC W/AUTO DIFF WBC: CPT | Performed by: EMERGENCY MEDICINE

## 2024-10-17 PROCEDURE — 93005 ELECTROCARDIOGRAM TRACING: CPT

## 2024-10-17 PROCEDURE — 96365 THER/PROPH/DIAG IV INF INIT: CPT

## 2024-10-17 PROCEDURE — 96366 THER/PROPH/DIAG IV INF ADDON: CPT

## 2024-10-17 PROCEDURE — 80053 COMPREHEN METABOLIC PANEL: CPT | Performed by: EMERGENCY MEDICINE

## 2024-10-17 PROCEDURE — 0202U NFCT DS 22 TRGT SARS-COV-2: CPT | Performed by: INTERNAL MEDICINE

## 2024-10-17 PROCEDURE — 87040 BLOOD CULTURE FOR BACTERIA: CPT | Performed by: EMERGENCY MEDICINE

## 2024-10-17 PROCEDURE — 90935 HEMODIALYSIS ONE EVALUATION: CPT | Performed by: INTERNAL MEDICINE

## 2024-10-17 PROCEDURE — 96368 THER/DIAG CONCURRENT INF: CPT

## 2024-10-17 PROCEDURE — 83605 ASSAY OF LACTIC ACID: CPT | Performed by: EMERGENCY MEDICINE

## 2024-10-17 PROCEDURE — 99291 CRITICAL CARE FIRST HOUR: CPT

## 2024-10-17 PROCEDURE — 93010 ELECTROCARDIOGRAM REPORT: CPT

## 2024-10-17 PROCEDURE — 5A1D70Z PERFORMANCE OF URINARY FILTRATION, INTERMITTENT, LESS THAN 6 HOURS PER DAY: ICD-10-PCS | Performed by: INTERNAL MEDICINE

## 2024-10-17 PROCEDURE — 96375 TX/PRO/DX INJ NEW DRUG ADDON: CPT

## 2024-10-17 RX ORDER — LABETALOL HYDROCHLORIDE 5 MG/ML
20 INJECTION, SOLUTION INTRAVENOUS ONCE
Status: COMPLETED | OUTPATIENT
Start: 2024-10-17 | End: 2024-10-17

## 2024-10-17 RX ORDER — NITROGLYCERIN 0.4 MG/1
0.4 TABLET SUBLINGUAL ONCE
Status: COMPLETED | OUTPATIENT
Start: 2024-10-17 | End: 2024-10-17

## 2024-10-17 RX ORDER — ACETAMINOPHEN 325 MG/1
650 TABLET ORAL EVERY 6 HOURS PRN
Status: DISCONTINUED | OUTPATIENT
Start: 2024-10-17 | End: 2024-10-22

## 2024-10-17 RX ORDER — HEPARIN SODIUM 1000 [USP'U]/ML
1.5 INJECTION, SOLUTION INTRAVENOUS; SUBCUTANEOUS
Status: COMPLETED | OUTPATIENT
Start: 2024-10-17 | End: 2024-10-17

## 2024-10-17 RX ORDER — FLUTICASONE PROPIONATE 50 MCG
1 SPRAY, SUSPENSION (ML) NASAL DAILY
Status: DISCONTINUED | OUTPATIENT
Start: 2024-10-18 | End: 2024-10-22

## 2024-10-17 RX ORDER — ALBUMIN (HUMAN) 12.5 G/50ML
25 SOLUTION INTRAVENOUS
Status: ACTIVE | OUTPATIENT
Start: 2024-10-17 | End: 2024-10-19

## 2024-10-17 RX ORDER — CARVEDILOL 12.5 MG/1
25 TABLET ORAL 2 TIMES DAILY
Status: DISCONTINUED | OUTPATIENT
Start: 2024-10-17 | End: 2024-10-22

## 2024-10-17 RX ORDER — NITROGLYCERIN 20 MG/100ML
INJECTION INTRAVENOUS
Status: DISCONTINUED | OUTPATIENT
Start: 2024-10-17 | End: 2024-10-22

## 2024-10-17 RX ORDER — TAMSULOSIN HYDROCHLORIDE 0.4 MG/1
0.4 CAPSULE ORAL DAILY
Status: DISCONTINUED | OUTPATIENT
Start: 2024-10-18 | End: 2024-10-22

## 2024-10-17 RX ORDER — HYDROMORPHONE HYDROCHLORIDE 1 MG/ML
0.8 INJECTION, SOLUTION INTRAMUSCULAR; INTRAVENOUS; SUBCUTANEOUS EVERY 2 HOUR PRN
Status: DISCONTINUED | OUTPATIENT
Start: 2024-10-17 | End: 2024-10-18

## 2024-10-17 RX ORDER — HYDROMORPHONE HYDROCHLORIDE 1 MG/ML
0.4 INJECTION, SOLUTION INTRAMUSCULAR; INTRAVENOUS; SUBCUTANEOUS EVERY 2 HOUR PRN
Status: DISCONTINUED | OUTPATIENT
Start: 2024-10-17 | End: 2024-10-18

## 2024-10-17 RX ORDER — HYDRALAZINE HYDROCHLORIDE 20 MG/ML
10 INJECTION INTRAMUSCULAR; INTRAVENOUS EVERY 6 HOURS PRN
Status: DISCONTINUED | OUTPATIENT
Start: 2024-10-17 | End: 2024-10-22

## 2024-10-17 RX ORDER — ALBUTEROL SULFATE 90 UG/1
2 INHALANT RESPIRATORY (INHALATION) EVERY 6 HOURS PRN
Status: DISCONTINUED | OUTPATIENT
Start: 2024-10-17 | End: 2024-10-22

## 2024-10-17 RX ORDER — ISOSORBIDE MONONITRATE 30 MG/1
30 TABLET, EXTENDED RELEASE ORAL DAILY
Status: DISCONTINUED | OUTPATIENT
Start: 2024-10-18 | End: 2024-10-22

## 2024-10-17 RX ORDER — FUROSEMIDE 10 MG/ML
80 INJECTION INTRAMUSCULAR; INTRAVENOUS ONCE
Status: COMPLETED | OUTPATIENT
Start: 2024-10-17 | End: 2024-10-17

## 2024-10-17 RX ORDER — HYDRALAZINE HYDROCHLORIDE 20 MG/ML
20 INJECTION INTRAMUSCULAR; INTRAVENOUS ONCE
Status: COMPLETED | OUTPATIENT
Start: 2024-10-17 | End: 2024-10-17

## 2024-10-17 RX ORDER — NIFEDIPINE 60 MG/1
60 TABLET, EXTENDED RELEASE ORAL 2 TIMES DAILY
Status: DISCONTINUED | OUTPATIENT
Start: 2024-10-17 | End: 2024-10-22

## 2024-10-17 RX ORDER — HYDROMORPHONE HYDROCHLORIDE 1 MG/ML
0.2 INJECTION, SOLUTION INTRAMUSCULAR; INTRAVENOUS; SUBCUTANEOUS EVERY 2 HOUR PRN
Status: DISCONTINUED | OUTPATIENT
Start: 2024-10-17 | End: 2024-10-18

## 2024-10-17 RX ORDER — SEVELAMER CARBONATE 800 MG/1
800 TABLET, FILM COATED ORAL
Status: DISCONTINUED | OUTPATIENT
Start: 2024-10-17 | End: 2024-10-18

## 2024-10-17 RX ORDER — LISDEXAMFETAMINE DIMESYLATE 60 MG/1
60 CAPSULE ORAL DAILY
Status: DISCONTINUED | OUTPATIENT
Start: 2024-10-18 | End: 2024-10-19

## 2024-10-17 RX ORDER — SEVELAMER CARBONATE 800 MG/1
1600 TABLET, FILM COATED ORAL
Status: DISCONTINUED | OUTPATIENT
Start: 2024-10-17 | End: 2024-10-17

## 2024-10-17 RX ORDER — LABETALOL HYDROCHLORIDE 5 MG/ML
10 INJECTION, SOLUTION INTRAVENOUS EVERY 6 HOURS PRN
Status: DISCONTINUED | OUTPATIENT
Start: 2024-10-17 | End: 2024-10-22

## 2024-10-17 RX ORDER — LAMOTRIGINE 150 MG/1
150 TABLET ORAL DAILY
Status: DISCONTINUED | OUTPATIENT
Start: 2024-10-18 | End: 2024-10-22

## 2024-10-17 RX ORDER — FENOFIBRATE 134 MG/1
134 CAPSULE ORAL NIGHTLY
Status: DISCONTINUED | OUTPATIENT
Start: 2024-10-17 | End: 2024-10-22

## 2024-10-17 RX ORDER — CLONIDINE HYDROCHLORIDE 0.1 MG/1
0.1 TABLET ORAL ONCE
Status: COMPLETED | OUTPATIENT
Start: 2024-10-17 | End: 2024-10-17

## 2024-10-17 RX ORDER — LOSARTAN POTASSIUM 100 MG/1
100 TABLET ORAL DAILY
Status: DISCONTINUED | OUTPATIENT
Start: 2024-10-18 | End: 2024-10-22

## 2024-10-17 RX ORDER — ONDANSETRON 2 MG/ML
4 INJECTION INTRAMUSCULAR; INTRAVENOUS EVERY 6 HOURS PRN
Status: DISCONTINUED | OUTPATIENT
Start: 2024-10-17 | End: 2024-10-22

## 2024-10-17 RX ORDER — HYDRALAZINE HYDROCHLORIDE 50 MG/1
50 TABLET, FILM COATED ORAL 2 TIMES DAILY
Status: DISCONTINUED | OUTPATIENT
Start: 2024-10-17 | End: 2024-10-18

## 2024-10-17 RX ORDER — DEXTROAMPHETAMINE SACCHARATE, AMPHETAMINE ASPARTATE, DEXTROAMPHETAMINE SULFATE AND AMPHETAMINE SULFATE 1.25; 1.25; 1.25; 1.25 MG/1; MG/1; MG/1; MG/1
10 TABLET ORAL
Status: DISCONTINUED | OUTPATIENT
Start: 2024-10-17 | End: 2024-10-17

## 2024-10-17 RX ORDER — CARVEDILOL 12.5 MG/1
25 TABLET ORAL 2 TIMES DAILY WITH MEALS
Status: DISCONTINUED | OUTPATIENT
Start: 2024-10-17 | End: 2024-10-17

## 2024-10-17 RX ORDER — BUPROPION HYDROCHLORIDE 150 MG/1
150 TABLET ORAL DAILY
Status: DISCONTINUED | OUTPATIENT
Start: 2024-10-18 | End: 2024-10-22

## 2024-10-17 RX ORDER — MEMANTINE HYDROCHLORIDE 5 MG/1
5 TABLET ORAL 2 TIMES DAILY
Status: DISCONTINUED | OUTPATIENT
Start: 2024-10-17 | End: 2024-10-22

## 2024-10-17 RX ORDER — HEPARIN SODIUM 5000 [USP'U]/ML
5000 INJECTION, SOLUTION INTRAVENOUS; SUBCUTANEOUS EVERY 8 HOURS SCHEDULED
Status: DISCONTINUED | OUTPATIENT
Start: 2024-10-17 | End: 2024-10-22

## 2024-10-17 NOTE — ED QUICK NOTES
Orders for admission, patient is aware of plan and ready to go upstairs. Any questions, please call ED JASON Alexander at extension [phone].     Patient Covid vaccination status: Fully vaccinated     COVID Test Ordered in ED: None    COVID Suspicion at Admission: N/A    Running Infusions:    nitroGLYCERIN in dextrose 5% 20 mcg/min (10/17/24 0951)        Mental Status/LOC at time of transport: AOx3    Other pertinent information:   CIWA score: N/A   NIH score:  N/A

## 2024-10-17 NOTE — H&P
[facility] Hospitalist History and Physical      Chief Complaint   Patient presents with    Difficulty Breathing        PCP: Adrian Small MD      History of Present Illness: Patient is a 44 year old male with PMH sig for  ESRD on HD (M/W/F), bipolar, depression, DM 2, HTN, DL, CAD, COPD, HFpEF, and chronic abdominal pain who presented to the ED for evaluation of cough and SOB.  He missed HD yesterday due to feeling poorly, states he has been coughing up clear sputum.  No chest pain.  He has multiple recent admissions to , most recently 10/6-10/8 GI bleed, GI consulted, recommended capsule study and surgery for hemorrhoids.  No F/C, N/V. Denies sick contacts.      In the ED, BP elevated to 180s/130s, NTG gtt was started.  K 5.7.  CXR showed worsening moderate patchy opacity of R base c/w developing PNA and effusion on the R.  IV CTX, IV lasix, IV hydralazine, IV labetalol, SL NTG, given.      On my evaluation, pt c/o cough productive of clear sputum.      Past Medical History:    Anxiety state    Arrhythmia    Asthma ([facility])    Attention deficit hyperactivity disorder (ADHD)    Back problem    Bipolar 1 disorder ([facility])    CKD (chronic kidney disease) stage 3, GFR 30-59 ml/min ([facility])    Dr Meeks    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[facility])    Congestive heart disease ([facility])    COPD (chronic obstructive pulmonary disease) ([facility])    Coronary atherosclerosis    Deep vein thrombosis ([facility])    at age 17 R/T cast    Depression    Diabetes ([facility])    Dialysis patient ([facility])    Diverticulosis of large intestine    Essential hypertension    3/21 echo: severe concentric LVH with normal EF and no MR or pHTN    Extrinsic asthma, unspecified    Heart attack ([facility])    2016- angiogram- no intervention    Heart valve disease    mitral valve repair in 1994/    High blood pressure    High cholesterol    History of blood transfusion    History of mitral valve repair    Hyperlipidemia    Low back pain    tight and stiff after sweeping and  mopping    LVH (left ventricular hypertrophy)    Migraines    Mixed hyperlipidemia     HDL 38 LDL 97 VLDL 57     Monoclonal gammopathy    IgG kappa     Muscle weakness    MVP (mitral valve prolapse)    Repair 1994 at [place]; echoes as recently as 3/21 show mild or trivial MR and no stenosis    Neuropathy    Osteoarthritis    hip ,knees    Pneumonia due to organism    Pulmonary embolism (H[facility])    Renal disorder    Stroke (HCC)    TIA (transient ischemic attack)    Initial history of left-sided weakness and slurred speech. (+) cocaine. MRI of the brain, CT angiogram of the head and neck, and 2D echo are all unremarkable.     TMJ (dislocation of temporomandibular joint)    Troponin level elevated    Trop 60 60 47 with TIA and no CP: Lexiscan negative with EF 51    Visual impairment      Past Surgical History:   Procedure Laterality Date    Av fistula revision, open Left     Cabg      Colonoscopy N/A 03/26/2023    Procedure: COLONOSCOPY;  Surgeon: Heath Vu MD;  Location:  ENDOSCOPY    Colonoscopy N/A 12/30/2023    Procedure: COLONOSCOPY with cold snare polypectomy and forcep polypectomy;  Surgeon: Ousmane Suarez MD;  Location:  ENDOSCOPY    Colonoscopy & polypectomy  2019    Egd  2019    Duodenitis. Biopsied. EUS for weight loss was negative    Heart surgery      Hernia surgery  08/17/2022    Dr Barnes    Laminectomy,>2 sgmt,lumbar  09/06/2018    L4-L5 Decomp Discectomy ROEM L4-L5    Mitralplasty w cp bypass  1994    [place]: Repair    Repair rotator cuff,chronic Left     torn and had a ruptured bicep    Sinus surgery        Spine surgery procedure unlisted      Valve repair  1994    mitral valve        ALL:  Allergies[1]     Medications Ordered Prior to Encounter[2]      Social History     Tobacco Use    Smoking status: Former     Current packs/day: 0.00     Average packs/day: 1 pack/day for 27.0 years (27.0 ttl pk-yrs)     Types: Cigarettes     Start date: 2/28/1995     Quit date: 2/28/2022      Years since quittin.6     Passive exposure: Never    Smokeless tobacco: Never   Substance Use Topics    Alcohol use: No        Fam Hx  Family History   Problem Relation Age of Onset    Hypertension Father     Alcohol and Other Disorders Associated Father     Substance Abuse Father         cocaine    Dementia Father     Cancer Father         lung    Diabetes Mother     Cancer Mother         multiple myeloma    Hypertension Mother     Anxiety Maternal Aunt     Depression Maternal Aunt     Anxiety Maternal Aunt     Depression Maternal Aunt     Bipolar Disorder Maternal Aunt     Diabetes Maternal Grandmother     Hypertension Maternal Grandmother     Cancer Maternal Grandfather         stomach cancer    Diabetes Maternal Grandfather     Hypertension Maternal Grandfather     Alcohol and Other Disorders Associated Maternal Grandfather     Hypertension Paternal Grandmother     Hypertension Paternal Grandfather     Cancer Sister         uterine and ovarian    Hypertension Sister     Cancer Maternal Uncle         lung    Cancer Paternal Aunt         throat       Review of Systems  Comprehensive ROS reviewed and negative except for what is stated in HPI.      OBJECTIVE:  BP (!) 172/121 (BP Location: Right arm)   Pulse 106   Temp 97.6 °F (36.4 °C) (Oral)   Resp 26   Ht 6' 2\" (1.88 m)   Wt 256 lb 6.3 oz (116.3 kg)   SpO2 96%   BMI 32.92 kg/m²   Gen: No acute distress, alert and oriented x3, no focal neurologic deficits.  Chronically ill appearing male.    HEENT:  EOMI, PERRLA, OP clear, MMM  Pulm:  +R basilar crackles, normal respiratory effort  CV: Heart with regular rate and rhythm, no murmur.  Normal PMI.    Abd: Abdomen soft, nontender, nondistended, no organomegaly, bowel sounds present  MSK: Full range of motion in extremities, no clubbing, no cyanosis  Skin: no rashes or lesions  Neuro:  Grossly intact, no focal deficits  Lines: RIJ CVC  LUE AVF      Data Review:    LABS:   Lab Results   Component Value Date     WBC 7.6 10/17/2024    HGB 8.7 10/17/2024    HCT 27.7 10/17/2024    .0 10/17/2024    CREATSERUM 10.70 10/17/2024    BUN 75 10/17/2024     10/17/2024    K 5.7 10/17/2024     10/17/2024    CO2 20.0 10/17/2024     10/17/2024    CA 7.8 10/17/2024    ALB 3.4 10/17/2024    ALKPHO 70 10/17/2024    BILT 0.4 10/17/2024    TP 7.0 10/17/2024    AST 33 10/17/2024    ALT 10 10/17/2024       CXR: image personally reviewed.      Radiology: XR CHEST AP PORTABLE  (CPT=71045)    Result Date: 10/17/2024  PROCEDURE:  XR CHEST AP PORTABLE  (CPT=71045)  TECHNIQUE:  AP chest radiograph was obtained.  COMPARISON:  [facility], XR, XR CHEST AP PORTABLE  (CPT=71045), 9/29/2024, 1:49 PM.  INDICATIONS:  miss dialysis [facility]ZBETH  PATIENT STATED HISTORY: (As transcribed by Technologist)  Patient states he missed his dialysis, fluid overload, and shortness of breath.              CONCLUSION:    Worsening appearance, now with development of moderate patchy infiltrate right base with redemonstration elevated right diaphragm, and blunting the costophrenic angle consistent with developing pneumonia and effusion on the right.  Left chest stable.  Stable cardiac enlargement.  No pneumothorax.  No other significant change.   LOCATION:  [facility]      Dictated by (CST): Joe London MD on 10/17/2024 at 8:15 AM     Finalized by (CST): Joe London MD on 10/17/2024 at 8:15 AM       CT ABDOMEN+PELVIS(CONTRAST ONLY)(CPT=74177)    Result Date: 10/6/2024  PROCEDURE:  CT ABDOMEN+PELVIS (CONTRAST ONLY) (CPT=74177)  COMPARISON:  [facility], CT, CT ABDOMEN+PELVIS(CPT=74176), 9/15/2024, 9:10 PM.  [facility], CT, CT ABDOMEN+PELVIS(CPT=74176), 8/30/2024, 9:57 AM.  [facility], CT, CT ABDOMEN+PELVIS(CONTRAST ONLY)(CPT=74177), 6/25/2024, 7:21 AM.  [facility], CT, CT ABDOMEN+PELVIS KIDNEYSTONE 2D RNDR(NO IV,NO ORAL)(CPT=74176), 6/23/2024, 9:21 AM.  INDICATIONS:  Rectal bleeding, sob  TECHNIQUE:  CT scanning was performed from the dome of the diaphragm  to the pubic symphysis with non-ionic intravenous contrast material. Post contrast coronal MPR imaging was performed.  Dose reduction techniques were used. Dose information is transmitted to the ACR (American College of Radiology) NRDR (National Radiology Data Registry) which includes the Dose Index Registry.  PATIENT STATED HISTORY:(As transcribed by Technologist)  Rectal bleeding, sob   CONTRAST USED:  100cc of Isovue 370  FINDINGS:  LIVER:  No enlargement, atrophy, abnormal density, or significant focal lesion.  BILIARY:  No visible dilatation or calcification.  PANCREAS:  No lesion, fluid collection, ductal dilatation, or atrophy.  SPLEEN:  No enlargement or focal lesion.  KIDNEYS:  No mass, obstruction, or calcification.  Tiny cyst in the right and left midpole of the kidney measures 4 mm. ADRENALS:  No mass or enlargement.  AORTA/VASCULAR:  No aneurysm or dissection.  RETROPERITONEUM:  No mass or adenopathy.  BOWEL/MESENTERY:  No visible mass, obstruction, or bowel wall thickening.  There are scattered diverticula throughout the colon without evidence of acute diverticulitis.  The appendix is normal.  Visualized small bowel is unremarkable. ABDOMINAL WALL:  No mass or hernia.  URINARY BLADDER:  There is diffuse mild wall thickening of the urinary bladder which can be seen with infectious cystitis. PELVIC NODES:  No adenopathy.  PELVIC ORGANS:  No visible mass.  Pelvic organs appropriate for patient age.  BONES:  No bony lesion or fracture.  Moderate to severe degenerative disc disease at L4-5 and L5-S1. LUNG BASES:  Decreased size of right pleural effusion with subsegmental atelectasis at the right lung base.  OTHER:  Negative.             CONCLUSION:   1. Diverticulosis of the colon without ends of diverticulitis.   2. Bladder wall thickening can be seen with cystitis.    LOCATION:  [facility]   Dictated by (CST): Jaycob Cassidy MD on 10/06/2024 at 10:14 PM     Finalized by (CST): Jaycob Cassidy MD on 10/06/2024 at  10:19 PM       CT CHEST(CONTRAST ONLY) (CPT=71260)    Result Date: 9/29/2024  PROCEDURE:  CT CHEST(CONTRAST ONLY) (CPT=71260)  COMPARISON:  [facility], CT, CTA CHEST + CT ABD (W) + CT PEL (W) SH(CPT=71275/56375), 11/23/2023, 4:31 PM.  INDICATIONS:  ID request - possible tunneled R subclavian catheter infection.  TECHNIQUE:  CT images were obtained with non-ionic intravenous contrast material. Dose reduction techniques were used. Dose information is transmitted to the ACR (American College of Radiology) NRDR (National Radiology Data Registry) which includes the Dose Index Registry.  PATIENT STATED HISTORY:(As transcribed by Technologist)   body aches, chill, night sweats- noticed drainage from dialysis cath this am   CONTRAST USED:  75cc of Isovue 370  FINDINGS:  LUNGS:  Multifocal dependent wedge-shaped areas of consolidation in lower lobes are noted and appears similar to previous study likely indicating chronic postinflammatory scar and atelectasis.  There is emphysematous change noted in upper lobes. VASCULATURE:  No visible pulmonary arterial thrombus or attenuation.  BASIL:  Right hilar lymph node measures 2.8 x 1.7 cm (previously 2.5 x 1.5 cm). MEDIASTINUM:  Low right paratracheal lymph node series 2, image 41 measures 2.2 x 1.4 cm (previously 2.2 x 1.5 cm). CARDIAC:  Metallic densities within the mitral valve are presumably from prior surgical reconstruction or replacement of the valve. PLEURA:  No mass or effusion.  THORACIC AORTA:  No aneurysm.  CHEST WALL:  Right internal jugular tunneled dialysis catheter is noted.  The tip of this is in SVC.  The entirety of the catheter is not included on the chest CT.  There is no acute abnormality associated with the catheter within the limits of this study.  LIMITED ABDOMEN:  Limited images of the upper abdomen are unremarkable.  BONES:  No bony lesion or fracture.             CONCLUSION:  1. Right hilar and right mediastinal lymph nodes have increased slightly in  size and are probably reactive. 2. Wedge-shaped areas of density in the right lung are unchanged and probably a combination of chronic scar and atelectasis. 3. Residual prior mitral valve surgery is noted. 4. There is otherwise no acute CT finding.   LOCATION:  [facility]   Dictated by (CST): Adrian Blevins MD on 9/29/2024 at 5:05 PM     Finalized by (CST): Adrian Blevins MD on 9/29/2024 at 5:09 PM       XR CHEST AP PORTABLE  (CPT=71045)    Result Date: 9/29/2024  PROCEDURE:  XR CHEST AP PORTABLE  (CPT=71045)  TECHNIQUE:  AP chest radiograph was obtained.  COMPARISON:  [facility], XR, XR CHEST AP PORTABLE  (CPT=71045), 9/15/2024, 8:01 PM.  INDICATIONS:  fatigue,pain and oozing from port nausea and vomiting . seen yesterday for same symptoms  PATIENT STATED HISTORY: (As transcribed by Technologist)  Pt c/o fatigue and nausea. Hx of heart surgery.    FINDINGS:  Valve prosthesis noted.  Cardiomegaly with normal pulmonary vascularity.  Small right pleural effusion.  Right dialysis catheter tips in the SVC.  No evidence of pneumothorax.            CONCLUSION:  No lobar pneumonia or overt congestive failure.  Small right pleural effusion.   LOCATION:  [id number]      Dictated by (CST): Jj Conley MD on 9/29/2024 at 2:44 PM     Finalized by (CST): Jj Conley MD on 9/29/2024 at 2:44 PM       IR CENTRAL VENOUS ACCESS    Result Date: 9/20/2024            PROCEDURE:  IR CENTRAL VENOUS CATHETER INSERTION  COMPARISON:  None.  INDICATIONS:  Dialysis, end-stage renal disease  TECHNIQUE:  Prior to the procedure, I discussed with the patient and/or legal representative the potential benefits, risks, and side effects of this procedure, the likelihood of the patient achieving goals; and the potential problems that might occur during recuperation.  I discussed reasonable alternatives to the procedure, including risks, benefits, steps to prevent infection and side effects related to the alternatives, and risks related to not receiving  this procedure. A witnessed verbal and signed consent was obtained and documented in the patient's chart.  IV was checked and maintained by the nurse. Moderate conscious sedation was performed under continuous pulse oximetry and cardiac monitoring under my direct supervision.  The radiology nurse was in attendance throughout the exam. Moderate conscious sedation of 4 mg Versed and 200 mcg of Fentanyl was given.  Patient was assessed and monitoring of oxygen saturation, heart rate, and blood pressure by the nursing staff and myself during the exam for a total intraservice time of 30 minutes of conscious sedation time from 8:22 a.m. to 8:52 a.m..  2 grams of Ancef were given pre-procedurally via existing IV.  The patient was placed supine on the angiographic table and the right chest and neck was prepped and covered with a full body drape in the usual sterile fashion.  All operators present for the case performed standard pre-procedural prep including hand washing, sterile gloves, gown, mask, and cap.  All aspects of the maximum sterile barrier technique were followed.  A preprocedural time out was performed with all physicians, technologists, and nurses involved with the procedure. Sonography of the right  neck demonstrated included right internal jugular vein and a patent right external jugular vein and a permanent image was obtained.  Under sonographic guidance, the right external jugular vein was accessed using a micropuncture system.  A guidewire was advanced into the IVC under fluoroscopic guidance.  Using blunt dissection a dual lumen tunneled catheter was placed via a peel-away sheath.  Both lumens flushed and returned easily.  The catheter was secured and heparinized.  A small amount of Dermabond was placed at the neck venotomy site.   Sterile gauze and transparent dressing was applied.  The patient tolerated the procedure well and there were no immediate complications. Routine post tunneled catheter  insertion instructions were communicated to the patient.  ESTIMATED BLOOD LOSS: Less than 5cc.  FLUORO TIME/DOSE: 0.2 minutes  AIR KERMA: 1.13 mGy  IMPRESSION: Successful placement of right external jugular vein tunneled dialysis catheter ( 23 cm tip to cuff Bard catheter).  The right internal jugular vein was occluded more centrally.  PLAN: The patient will follow-up with nephrology and at dialysis.  Catheter is ready for use, routine post-catheter care.  LOCATION:  [facility]    Dictated by (CST): Quinn Bernal MD on 9/20/2024 at 9:43 AM     Finalized by (CST): Quinn Bernal MD on 9/20/2024 at 9:44 AM       US VENOUS DOPPLER LEG LEFT - DIAG IMG (CPT=93971)    Result Date: 9/19/2024  PROCEDURE:  US VENOUS DOPPLER LEG LEFT - DIAG IMG (CPT=93971)  COMPARISON:  [facility], US DUPLEX SCAN HEMODIALYSIS ACCESS  (CPT=93990), 9/18/2024, 2:38 PM.  INDICATIONS:  Left calf pain  TECHNIQUE:  Real time, grey scale, and duplex ultrasound was used to evaluate the lower extremity venous system. B-mode two-dimensional images of the vascular structures, Doppler spectral analysis, and color flow.  Doppler imaging were performed.  The following veins were imaged:  Common, deep, and superficial femoral, popliteal, sapheno-femoral junction, posterior tibial veins, and the contralateral common femoral vein.  PATIENT STATED HISTORY: (As transcribed by Technologist)     FINDINGS:  EXTREMITY EXAMINED:  Left lower extremity SAPHENOFEMORAL JUNCTION:  No reflux. THROMBI:  None visible. COMPRESSION:  Normal compressibility, phasicity, and augmentation. OTHER:  Negative.            CONCLUSION:  Negative for deep venous thrombosis of the left lower extremity   LOCATION:  [facility]    Dictated by (CST): Claude Singh MD on 9/19/2024 at 10:06 AM     Finalized by (CST): Claude Singh MD on 9/19/2024 at 10:06 AM       US DUPLEX SCAN HEMODIALYSIS ACCESS  (CPT=93990)    Result Date: 9/18/2024  PROCEDURE:  US DUPLEX SCAN HEMODIALYSIS ACCESS  (CPT=93990)   COMPARISON:  None.  INDICATIONS:  LUE AVF access issue  TECHNIQUE:  Real time gray scale and duplex color Doppler sonography was used to evaluate the left upper extremity arterial and venous system. Being noted two-dimensional images of the vascular structures, Doppler spectral analysis, and color Doppler imaging were performed  PATIENT STATED HISTORY: (As transcribed by Technologist)     FINDINGS:   Left UPPER EXTREMITY ARTERIAL: Proximal brachial: diameter 8 mm, velocity 109 centimeters/sec Mid brachial: diameter 7 mm, velocity 77 centimeters/sec Distal brachial: diameter 7 mm, velocity 83 centimeters/sec Proximal radial: diameter 6 mm, velocity 95 centimeters/sec Mid radial: diameter 6 mm, velocity 89 centimeters/sec Distal radial: diameter 6 mm, velocity 89 centimeters/sec Fistula/graft arterial anastomosis: diameter 7 mm, velocity 345 centimeters/sec Proximal graft: diameter 3 mm, velocity 380 centimeters/sec Mid graft: diameter 4 mm, velocity 74 centimeters/sec Distal graft: diameter 67 mm, velocity 4 centimeters/sec  Left UPPER EXTREMITY VENOUS: Proximal arm cephalic vein: diameter 2 mm, velocity 6 centimeters/sec Mid arm cephalic vein:  diameter 2 mm, velocity 9 centimeters/sec Distal arm cephalic vein: diameter 2 mm, velocity 5 centimeters/sec Prox basilic vein: diameter 7 mm, velocity 24 centimeters/sec Mid basilic vein: diameter 6 mm, velocity 44 centimeters/sec Distal basilic vein: diameter 6 mm, velocity 29 centimeters/sec            CONCLUSION:  Elevated velocity of the left upper extremity fistula at the anastomosis and proximal venous outflow is suggestive of stenosis in this region.   LOCATION:  [id number]    Dictated by (CST): Smith Randle MD on 9/18/2024 at 4:07 PM     Finalized by (CST): Smith Randle MD on 9/18/2024 at 4:12 PM          Assessment/Plan:     44 yr old male with PMH sig for  ESRD on HD (M/W/F), bipolar, depression, DM 2, HTN, DL, CAD, COPD, HFpEF, and chronic abdominal pain who presented  to the ED for evaluation of cough and SOB.     # Hypertensive emergency   - s/p NGT gtt  - HD today per renal  - restart home BP meds coreg 25 mg BID, imdur 30 mg/d, losartan 100 mg/d, nifedipine XL 60 mg/d   - increase clonidine to BID when restarted   - PRN IV labetalol and hydralazine     # Suspected RLL pneumonia   - start empiric zosyn for cover for HCAP  - check expanded flu panel   - f/u Bcx's    # ESRD on HD  - cont HD per renal  - renal c/s appreciated     # Chronic diastolic HF  - volume control with HD per renal     # Hx of GI bleed  - seen by GI last admit  - f/u with GI for capsule study  - hemorrhoid surgery when possible     # Chronic abd pain  # Chronic back pain  # Opioid dependence   - follows with pain clinic as outpt   - resume home pain medications   - wean off IV narcotics when possible     # HLD  - cont statin     # COPD  - no evidence of acute exacerbation   - resume home inhalers      # Major depression, recurrent   # Bipolar d/o  - stable  - resume home medications     DVT prophy - hep subcutaneous   Dispo:  inpt care.  POC d/w pt who agrees.     Outpatient records or previous hospital records reviewed.   [facility] hospitalist to continue to follow patient while in house  A total of 76 minutes taken with patient and coordinating care.  Greater than 50% face to face encounter.      Phong Freedman DO  [facility] Hospitalist         [1]   Allergies  Allergen Reactions    Hydrochlorothiazide RASH and HIVES   [2]   Current Facility-Administered Medications on File Prior to Encounter   Medication Dose Route Frequency Provider Last Rate Last Admin    [COMPLETED] influenza virus trivalent PF (Fluzone Trivalent) 0.5 mL IM injection (ages 6 months to 64 years) 0.5 mL  0.5 mL Intramuscular Prior to discharge Vicky Weiss DO   0.5 mL at 10/08/24 1549    [COMPLETED] heparin (Porcine) 1000 UNIT/ML injection 1,500 Units  1.5 mL Intracatheter Once Samaria Nava MD   1,500 Units at 10/07/24 1531     [COMPLETED] vancomycin (Vancocin) 1,000 mg in sodium chloride 0.9% 250 mL IVPB-ADDV  10 mg/kg Intravenous Once Phong Freedman DO   Stopped at 10/08/24 1142    [COMPLETED] HYDROmorphone (Dilaudid) 1 MG/ML injection 0.5 mg  0.5 mg Intravenous Once Deanna Cervantes MD   0.5 mg at 10/06/24 2130    [COMPLETED] ondansetron (Zofran) 4 MG/2ML injection 4 mg  4 mg Intravenous Once Deanna Cervantes MD   4 mg at 10/06/24 2132    [COMPLETED] sodium chloride 0.9 % IV bolus 1,000 mL  1,000 mL Intravenous Once Deanna Cervantes MD   Stopped at 10/06/24 2230    [COMPLETED] pantoprazole (Protonix) 80 mg in sodium chloride 0.9% PF 20 mL IV push  80 mg Intravenous Once Deanna Cervantes MD   80 mg at 10/06/24 2135    [COMPLETED] iopamidol 76% (ISOVUE-370) injection for power injector  100 mL Intravenous ONCE PRN Deanna Cervantes MD   100 mL at 10/06/24 2212    [COMPLETED] HYDROmorphone (Dilaudid) 1 MG/ML injection 0.5 mg  0.5 mg Intravenous Once Deanna Cervantes MD   0.5 mg at 10/06/24 2315    [COMPLETED] vancomycin (Vancocin) 2.25 g in sodium chloride 0.9% 500 mL IVPB premix  25 mg/kg Intravenous Once Selam Jesus  mL/hr at 09/30/24 1344 2,250 mg at 09/30/24 1344    [COMPLETED] heparin (Porcine) 1000 UNIT/ML injection 4,000 Units  4,000 Units Intravenous Once Samaria Nava MD   4,000 Units at 09/30/24 1311    [COMPLETED] HYDROcodone-acetaminophen (Norco) 5-325 MG per tab 2 tablet  2 tablet Oral Once Mckinley Macedo MD   2 tablet at 09/29/24 1447    [COMPLETED] vancomycin (Vancocin) 2,250 mg in sodium chloride 0.9% 500 mL IVPB  25 mg/kg Intravenous Once Mckinley Macedo  mL/hr at 09/29/24 1559 2,250 mg at 09/29/24 1559    [COMPLETED] iopamidol 76% (ISOVUE-370) injection for power injector  75 mL Intravenous ONCE PRN Mckinley Macedo MD   75 mL at 09/29/24 1645    [COMPLETED] HYDROmorphone (Dilaudid) 1 MG/ML injection 0.5 mg  0.5 mg Intravenous Once Petar Garcia MD   0.5 mg at 09/28/24 0652    [COMPLETED] ondansetron  (Zofran) 4 MG/2ML injection 4 mg  4 mg Intravenous Once Petar Garcia MD   4 mg at 24 0652    [COMPLETED] hydrALAzine (Apresoline) 20 mg/mL injection 10 mg  10 mg Intravenous Once Petar Garcia MD   10 mg at 24 0713    [COMPLETED] cloNIDine (Catapres) tab 0.1 mg  0.1 mg Oral Once Petar Garcia MD   0.1 mg at 24 0758    [COMPLETED] hydrALAzine (Apresoline) 20 mg/mL injection 10 mg  10 mg Intravenous Once Petar Garcia MD   10 mg at 24 0758    [COMPLETED] lidocaine-EPINEPHrine (Xylocaine-Epinephrine) 2 %-1:245518 injection             [COMPLETED] lidocaine (Xylocaine) 1 % injection             [COMPLETED] fentaNYL (Sublimaze) 50 mcg/mL injection             [COMPLETED] midazolam (Versed) 2 MG/2ML injection             [COMPLETED] ceFAZolin (Ancef) 1 g injection             [COMPLETED] heparin (Porcine) 5000 UNIT/ML injection             [COMPLETED] fentaNYL (Sublimaze) 50 mcg/mL injection             [COMPLETED] midazolam (Versed) 2 MG/2ML injection             [] sodium chloride 0.9 % IV bolus 100 mL  100 mL Intravenous Q30 Min PRN Toni, Ali, DO        And    [] albumin human (Albumin) 25% injection 25 g  25 g Intravenous PRN Dialysis Toni, Ali, DO   25 g at 24 1553    [COMPLETED] HYDROmorphone (Dilaudid) 1 MG/ML injection 0.5 mg  0.5 mg Intravenous Q30 Min PRN Mckinley Saunders MD   0.5 mg at 09/15/24 2227    [] HYDROmorphone (Dilaudid) 1 MG/ML injection 0.5 mg  0.5 mg Intravenous Q30 Min PRN Mckinley Saunders MD        [] ondansetron (Zofran) 4 MG/2ML injection 4 mg  4 mg Intravenous Q4H PRN Mckinley Saunders MD   4 mg at 24 0021    [COMPLETED] furosemide (Lasix) 10 mg/mL injection 40 mg  40 mg Intravenous Once Mckinley Saunders MD   40 mg at 09/15/24 2037    [COMPLETED] cloNIDine (Catapres) tab 0.1 mg  0.1 mg Oral Once Mckinley Saunders MD   0.1 mg at 09/15/24 2130    [COMPLETED] furosemide (Lasix) 10 mg/mL injection 40 mg  40 mg Intravenous Once Mckinley Saunders,  MD   40 mg at 09/15/24 2132    [COMPLETED] epoetin sylvia (Epogen, Procrit) 44837 UNIT/ML injection 10,000 Units  10,000 Units Intravenous Once Lenka Bond MD   10,000 Units at 24 1219    [] sodium chloride 0.9 % IV bolus 100 mL  100 mL Intravenous Q30 Min PRN Lenka Bond MD        And    [] albumin human (Albumin) 25% injection 25 g  25 g Intravenous PRN Dialysis Lenka Bond MD        [COMPLETED] HYDROmorphone (Dilaudid) 1 MG/ML injection 0.5 mg  0.5 mg Intravenous Once Lew, Meera, DO   0.5 mg at 24 0907    [COMPLETED] HYDROmorphone (Dilaudid) 1 MG/ML injection 1 mg  1 mg Intravenous Once Lew, Meera, DO   1 mg at 24 0938    [COMPLETED] HYDROmorphone (Dilaudid) 1 MG/ML injection 1 mg  1 mg Intravenous Once Lew Meera, DO   1 mg at 24 1109    [COMPLETED] furosemide (Lasix) 10 mg/mL injection 40 mg  40 mg Intravenous Once Peksa-Sink, Kerry L, DO   40 mg at 24 2325    [COMPLETED] HYDROmorphone (Dilaudid) 1 MG/ML injection 0.5 mg  0.5 mg Intravenous Once Peksa-Sink, Kerry L, DO   0.5 mg at 24 0014    [COMPLETED] predniSONE (Deltasone) tab 40 mg  40 mg Oral Once Del Cordova MD   40 mg at 24 1858    [COMPLETED] acetaminophen (Tylenol Extra Strength) tab 1,000 mg  1,000 mg Oral Once Camille Jones MD   1,000 mg at 24 1713    [COMPLETED] ampicillin-sulbactam (Unasyn) 3 g in sodium chloride 0.9% 100mL IVPB-ADD  3 g Intravenous Once Camille Jones MD   Stopped at 24 1743     Current Outpatient Medications on File Prior to Encounter   Medication Sig Dispense Refill    memantine 5 MG Oral Tab Take 1 tablet (5 mg total) by mouth 2 (two) times daily.      Lisdexamfetamine Dimesylate 70 MG Oral Cap Take 1 capsule (70 mg total) by mouth every morning.      HYDROcodone-acetaminophen (NORCO)  MG Oral Tab Take 1 tablet by mouth every 6 (six) hours as needed for Pain. 20 tablet 0    cloNIDine 0.1 MG Oral Tab Take 1 tablet (0.1 mg  total) by mouth 2 (two) times daily. 60 tablet 0    levoFLOXacin 250 MG Oral Tab TAKE 2 TABLETs BY MOUTH one time then take 1 tablet every 48 hours for 30 days      albuterol 108 (90 Base) MCG/ACT Inhalation Aero Soln Inhale 2 puffs into the lungs every 6 (six) hours as needed for Wheezing.      tiotropium 18 MCG Inhalation Cap Inhale 1 capsule (18 mcg total) into the lungs daily.      tamsulosin 0.4 MG Oral Cap Take 1 capsule (0.4 mg total) by mouth daily.      ARIPiprazole 15 MG Oral Tab Take 1 tablet (15 mg total) by mouth daily.      isosorbide mononitrate ER 30 MG Oral Tablet 24 Hr Take 1 tablet (30 mg total) by mouth daily. Hold if systolic blood pressure <130      buPROPion  MG Oral Tablet 24 Hr Take 1 tablet (150 mg total) by mouth daily.      lamoTRIgine (LAMICTAL) 150 MG Oral Tab Take 1 tablet (150 mg total) by mouth daily. 7 tablet 0    FLUoxetine HCl 40 MG Oral Cap Take 1 capsule (40 mg total) by mouth daily. 7 capsule 0    RENVELA 800 MG Oral Tab Take 1 tablet (800 mg total) by mouth 3 (three) times daily with meals.      losartan 100 MG Oral Tab Take 1 tablet (100 mg total) by mouth daily. Hold if systolic blood pressure <130      hydrALAZINE 50 MG Oral Tab Take 1 tablet (50 mg total) by mouth in the morning and 1 tablet (50 mg total) before bedtime. Hold if systolic blood pressure <130. 30 tablet 0    NIFEdipine ER 60 MG Oral Tablet 24 Hr Take 1 tablet (60 mg total) by mouth in the morning and 1 tablet (60 mg total) before bedtime. Hold if systolic blood pressure <130.      carvedilol 25 MG Oral Tab Take 1 tablet (25 mg total) by mouth in the morning and 1 tablet (25 mg total) in the evening. Take with meals. 60 tablet 6    Fenofibrate 134 MG Oral Cap Take 1 capsule by mouth nightly. 90 capsule 1    Fluticasone Propionate 50 MCG/ACT Nasal Suspension SPRAY ONCE INTO EACH NOSTRIL BID PRN 15.8 mL 0    [] HYDROcodone-acetaminophen 5-325 MG Oral Tab Take 1-2 tablets by mouth every 4 (four)  hours as needed for Pain. 15 tablet 0    [] Vancomycin HCl in NaCl 1-0.9 GM/250ML-% Intravenous Solution Inject 250 mL (1 g total) into the vein Every Monday, Wednesday, and Friday for 14 days. Pharmacy to dose, trough goals 15-20. 6 each 0    [] ceFAZolin in sodium chloride 0.9% 3 g/100mL Intravenous Solution Inject 100 mL (3 g total) into the vein Every Friday for 2 doses. 200 mL 0    [] ceFAZolin Sodium 2 g Intravenous Recon Soln Inject 2 g into the vein Every Monday for 2 doses. 4 g 0    [] ceFAZolin Sodium 2 g Intravenous Recon Soln Inject 2 g into the vein Every Wednesday for 2 doses. 4 g 0

## 2024-10-17 NOTE — CONSULTS
[facility]   part of [facility]    Report of Consultation    Godwin Fonseca Patient Status:  Inpatient    1980 MRN [id number]   Location [facility] 2NE-A Attending Phong Freedman, DO   Hosp Day # 0 PCP Adrian Small MD       REASON FOR CONSULT:     ESRD    HISTORY OF PRESENT ILLNESS:     45 yo M with history of ESRD on iHD MWF via RIJ CVC, LUE AVF not in use, hyperaldosteronism, DM, biopolar disorder, recurrent GI bleed presented with cough/sob. Missed HD yesterday due to feeling poorly. Nephrology consulted for further management. K 5.7 in ER - given lasix. Lokelma ordered but not given. Being treated for RLL pna.    REVIEW OF SYSTEMS:     Please see HPI for pertinent positives. 10 point review of systems otherwise reviewed and negative.     HISTORY:     Past Medical History:    Asthma (HCC)    Attention deficit hyperactivity disorder (ADHD)    Back problem    Bipolar 1 disorder (HCC)    CKD (chronic kidney disease) stage 3, GFR 30-59 ml/min ([facility])    Dr Meeks    Congestive heart disease ([facility])    COPD (chronic obstructive pulmonary disease) ([facility])    Coronary atherosclerosis    Deep vein thrombosis ([facility])    at age 17 R/T cast    Depression    Diabetes ([facility])    Essential hypertension    3/21 echo: severe concentric LVH with normal EF and no MR or pHTN    Extrinsic asthma, unspecified    Heart attack (HCC)    - angiogram- no intervention    Heart valve disease    mitral valve repair in /    High blood pressure    High cholesterol    History of blood transfusion    History of mitral valve repair    Hyperlipidemia    Low back pain    tight and stiff after sweeping and mopping    LVH (left ventricular hypertrophy)    Migraines    Mixed hyperlipidemia     HDL 38 LDL 97 VLDL 57     Monoclonal gammopathy    IgG kappa     MVP (mitral valve prolapse)    Repair  at [place]; echoes as recently as 3/21 show mild or trivial MR and no stenosis    Neuropathy    Osteoarthritis    hip  ,knees    Pneumonia due to organism    Pulmonary embolism (HCC)    Renal disorder    Stroke (HCC)    TIA (transient ischemic attack)    Initial history of left-sided weakness and slurred speech. (+) cocaine. MRI of the brain, CT angiogram of the head and neck, and 2D echo are all unremarkable.     TMJ (dislocation of temporomandibular joint)    Troponin level elevated    Trop 60 60 47 with TIA and no CP: Lexiscan negative with EF 51     Past Surgical History:   Procedure Laterality Date    Av fistula revision, open Left     Colonoscopy N/A 03/26/2023    Procedure: COLONOSCOPY;  Surgeon: Heath Vu MD;  Location:  ENDOSCOPY    Colonoscopy N/A 12/30/2023    Procedure: COLONOSCOPY with cold snare polypectomy and forcep polypectomy;  Surgeon: Ousmane Suarez MD;  Location:  ENDOSCOPY    Colonoscopy & polypectomy  2019    Egd  2019    Duodenitis. Biopsied. EUS for weight loss was negative    Heart surgery      Hernia surgery  08/17/2022    Dr Barnes    Laminectomy,>2 sgmt,lumbar  09/06/2018    L4-L5 Decomp Discectomy ROEM L4-L5    Mitralplasty w cp bypass  1994    [facility]: Repair    Repair rotator cuff,chronic Left     torn and had a ruptured bicep    Valve repair  1994    mitral valve     Family History   Problem Relation Age of Onset    Hypertension Father     Alcohol and Other Disorders Associated Father     Substance Abuse Father         cocaine    Dementia Father     Cancer Father         lung    Diabetes Mother     Cancer Mother         multiple myeloma    Hypertension Mother     Anxiety Maternal Aunt     Depression Maternal Aunt     Anxiety Maternal Aunt     Depression Maternal Aunt     Bipolar Disorder Maternal Aunt     Diabetes Maternal Grandmother     Hypertension Maternal Grandmother     Cancer Maternal Grandfather         stomach cancer    Diabetes Maternal Grandfather     Hypertension Maternal Grandfather     Alcohol and Other Disorders Associated Maternal Grandfather     Hypertension Paternal  Grandmother     Hypertension Paternal Grandfather     Cancer Sister         uterine and ovarian    Hypertension Sister     Cancer Maternal Uncle         lung    Cancer Paternal Aunt         throat      reports that he quit smoking about 2 years ago. His smoking use included cigarettes. He started smoking about 29 years ago. He has a 27 pack-year smoking history. He has never been exposed to tobacco smoke. He has never used smokeless tobacco. He reports that he does not drink alcohol and does not use drugs.    ALLERGIES:     Allergies[1]    MEDICATIONS:       Current Facility-Administered Medications:     nitroGLYCERIN in dextrose 5% 50 mg/250mL infusion premix, 5-400 mcg/min, Intravenous, Titrated    sodium zirconium cyclosilicate (Lokelma) oral packet 10 g, 10 g, Oral, Once  No current outpatient medications on file.         PHYSICAL EXAM:     Vital Signs: BP (!) 172/121 (BP Location: Right arm)   Pulse 106   Temp 97.6 °F (36.4 °C) (Oral)   Resp 26   Ht 188 cm (6' 2\")   Wt 256 lb 6.3 oz (116.3 kg)   SpO2 96%   BMI 32.92 kg/m²   Temp (24hrs), Av.9 °F (36.6 °C), Min:97.6 °F (36.4 °C), Max:98.1 °F (36.7 °C)       Intake/Output Summary (Last 24 hours) at 10/17/2024 1201  Last data filed at 10/17/2024 0942  Gross per 24 hour   Intake 100 ml   Output --   Net 100 ml     Wt Readings from Last 3 Encounters:   10/17/24 256 lb 6.3 oz (116.3 kg)   10/07/24 240 lb (108.9 kg)   24 245 lb (111.1 kg)       General: ill appearing  Skin: no visible rashes  HEENT: NCAT  Cardiac: Regular rate and rhythm, no murmur/gallop or rub  Lungs: R base diminished + crackles  Abdomen: Soft, NTND  Extremities: warm, well perfused, no leg edema  Neurologic/Psych: mentating well  RIJ CVC  LUE AVF + thrill and bruit but small    LABORATORY DATA:       Lab Results   Component Value Date     (H) 10/17/2024    BUN 75 (H) 10/17/2024    BUNCREA 13.0 2022    CREATSERUM 10.70 (H) 10/17/2024    ANIONGAP 12 10/17/2024    GFR 59  (L) 01/04/2018    GFRNAA 30 (L) 07/12/2022    GFRAA 35 (L) 07/12/2022    CA 7.8 (L) 10/17/2024    OSMOCALC 308 (H) 10/17/2024    ALKPHO 70 10/17/2024    AST 33 10/17/2024    ALT 10 (L) 10/17/2024    BILT 0.4 10/17/2024    TP 7.0 10/17/2024    ALB 3.4 10/17/2024    GLOBULIN 3.6 10/17/2024     10/17/2024    K 5.7 (H) 10/17/2024     10/17/2024    CO2 20.0 (L) 10/17/2024     Lab Results   Component Value Date    WBC 7.6 10/17/2024    RBC 2.71 (L) 10/17/2024    HGB 8.7 (L) 10/17/2024    HCT 27.7 (L) 10/17/2024    .0 10/17/2024    MPV 11.5 12/14/2012    .2 (H) 10/17/2024    MCH 32.1 10/17/2024    MCHC 31.4 10/17/2024    RDW 15.4 10/17/2024    NEPRELIM 5.08 10/17/2024    NEPERCENT 67.3 10/17/2024    LYPERCENT 15.5 10/17/2024    MOPERCENT 11.5 10/17/2024    EOPERCENT 4.4 10/17/2024    BAPERCENT 0.9 10/17/2024    NE 5.08 10/17/2024    LYMABS 1.17 10/17/2024    MOABSO 0.87 10/17/2024    EOABSO 0.33 10/17/2024    BAABSO 0.07 10/17/2024     Lab Results   Component Value Date    MALBP 167.00 05/24/2023    CREUR 142.00 05/24/2023    CREUR 142.00 05/24/2023     Lab Results   Component Value Date    COLORUR Colorless (A) 09/16/2024    CLARITY Clear 09/16/2024    SPECGRAVITY 1.012 09/16/2024    GLUUR Normal 09/16/2024    BILUR Negative 09/16/2024    KETUR Negative 09/16/2024    BLOODURINE 3+ (A) 09/16/2024    PHURINE 6.0 09/16/2024    PROUR 50 (A) 09/16/2024    UROBILINOGEN Normal 09/16/2024    NITRITE Negative 09/16/2024    LEUUR Negative 09/16/2024    WBCUR 1-5 09/16/2024    RBCUR 0-2 09/16/2024    EPIUR Few (A) 09/16/2024    BACUR None Seen 09/16/2024    CAOXUR Occasional (A) 04/20/2018    HYLUR Present (A) 05/14/2019         IMAGING:     Reviewed.      ASSESSMENT/PLAN:   43 yo M with history of ESRD on iHD MWF via RIJ CVC, LUE AVF not in use, hyperaldosteronism, DM, biopolar disorder, recurrent GI bleed presented with cough/sob.    ESRD:  -- HD today to make up for yesterday, then resume MWF per  schedule  -- 3-4 L UF  -- SW consult as patient interested in changing HD units    LUE AVF:  -- L arm precautions     Anemia in ESRD:  -- defer OWEN given high BP     MBD:  -- resume sevelamer to 1600 TID AC     Hyperkalemia:  -- 2K bath; ok to defer lokelma since doing HD    HTN:  -- s/p clonidine, lasix, hydralazine, labetalol, nitroglycerine gtt  -- UF with HD, then pending BP trend can restart home meds: coreg 25 mg BID, imdur 30 mg/d, losartan 100 mg/d, nifedipine XL 60 mg/d  -- clonidine BID not daily if restarting    Pneumonia:  -- started ceftraixone        Thank you for allowing me to participate in the care of your patient. Please do not hesitate to contact me with concerns or questions.    Lenka Bond MD  [facility]ph[facility]  [address] [place] [id number]    10/17/2024  12:01 PM         [1]   Allergies  Allergen Reactions    Hydrochlorothiazide RASH and HIVES

## 2024-10-17 NOTE — ED INITIAL ASSESSMENT (HPI)
Pt reports coming in due to missing one episode of dialysis. Reports he missed because he was not feeling well.

## 2024-10-17 NOTE — ED PROVIDER NOTES
Patient Seen in: [facility] In [place]      History     Chief Complaint   Patient presents with    Difficulty Breathing     Stated Complaint: miss dialysis LIZBETH    Subjective:   HPI      Patient has medical history notable low.  States has been cough for the last 2 days.  Did not feel well enough to go to dialysis yesterday.  Today he woke up very early in the morning and feels very short of breath.  Feels like he is overloaded.  No fevers no chills, states he is coughing up clear sputum.  Denies any chest pain.    Objective:     Past Medical History:    Asthma ([facility])    Attention deficit hyperactivity disorder (ADHD)    Back problem    Bipolar 1 disorder ([facility])    CKD (chronic kidney disease) stage 3, GFR 30-59 ml/min ([facility])    Dr Meeks    Congestive heart disease ([facility])    COPD (chronic obstructive pulmonary disease) ([facility])    Coronary atherosclerosis    Deep vein thrombosis ([facility])    at age 17 R/T cast    Depression    Diabetes ([facility])    Essential hypertension    3/21 echo: severe concentric LVH with normal EF and no MR or pHTN    Extrinsic asthma, unspecified    Heart attack ([facility])    2016- angiogram- no intervention    Heart valve disease    mitral valve repair in 1994/    High blood pressure    High cholesterol    History of blood transfusion    History of mitral valve repair    Hyperlipidemia    Low back pain    tight and stiff after sweeping and mopping    LVH (left ventricular hypertrophy)    Migraines    Mixed hyperlipidemia     HDL 38 LDL 97 VLDL 57     Monoclonal gammopathy    IgG kappa     MVP (mitral valve prolapse)    Repair 1994 at [place]; echoes as recently as 3/21 show mild or trivial MR and no stenosis    Neuropathy    Osteoarthritis    hip ,knees    Pneumonia due to organism    Pulmonary embolism (HCC)    Renal disorder    Stroke ([facility])    TIA (transient ischemic attack)    Initial history of left-sided weakness and slurred speech. (+) cocaine. MRI of the brain, CT  angiogram of the head and neck, and 2D echo are all unremarkable.     TMJ (dislocation of temporomandibular joint)    Troponin level elevated    Trop 60 60 47 with TIA and no CP: Lexiscan negative with EF 51              Past Surgical History:   Procedure Laterality Date    Av fistula revision, open Left     Colonoscopy N/A 2023    Procedure: COLONOSCOPY;  Surgeon: Heath Vu MD;  Location:  ENDOSCOPY    Colonoscopy N/A 2023    Procedure: COLONOSCOPY with cold snare polypectomy and forcep polypectomy;  Surgeon: Ousmane Suarez MD;  Location:  ENDOSCOPY    Colonoscopy & polypectomy  2019    Egd  2019    Duodenitis. Biopsied. EUS for weight loss was negative    Heart surgery      Hernia surgery  2022    Dr Barnes    Laminectomy,>2 sgmt,lumbar  2018    L4-L5 Decomp Discectomy ROEM L4-L5    Mitralplasty w cp bypass      [place]: Repair    Repair rotator cuff,chronic Left     torn and had a ruptured bicep    Valve repair      mitral valve                Social History     Socioeconomic History    Marital status:    Tobacco Use    Smoking status: Former     Current packs/day: 0.00     Average packs/day: 1 pack/day for 27.0 years (27.0 ttl pk-yrs)     Types: Cigarettes     Start date: 1995     Quit date: 2022     Years since quittin.6     Passive exposure: Never    Smokeless tobacco: Never   Vaping Use    Vaping status: Never Used   Substance and Sexual Activity    Alcohol use: No    Drug use: No     Social Drivers of Health     Financial Resource Strain: Medium Risk (2024)    [facility]    Overall Financial Resource Strain (CARDIA)     Difficulty of Paying Living Expenses: Somewhat hard   Food Insecurity: No Food Insecurity (10/7/2024)    Food Insecurity     Food Insecurity: Never true   Transportation Needs: No Transportation Needs (10/7/2024)    Transportation Needs     Lack of Transportation: No   Physical Activity: Inactive  (5/7/2024)    Received from [facility]    Exercise Vital Sign     Days of Exercise per Week: 0 days     Minutes of Exercise per Session: 0 min   Stress: Stress Concern Present (5/7/2024)    Received from [facility]    [employer] [place] [employer] - Occupational Stress Questionnaire     Feeling of Stress : Rather much   Social Connections: Unknown (5/7/2024)    Received from [facility]    Social Connection and Isolation Panel [NHANES]     Frequency of Communication with Friends and Family: More than three times a week     Frequency of Social Gatherings with Friends and Family: More than three times a week     Attends Rastafarian Services: Never     Active Member of Clubs or Organizations: No     Attends Club or Organization Meetings: Never     Marital Status: Patient unable to answer   Housing Stability: Low Risk  (10/7/2024)    Housing Stability     Housing Instability: No                  Physical Exam     ED Triage Vitals [10/17/24 0703]   BP (!) 168/118   Pulse 106   Resp (!) 28   Temp 98.1 °F (36.7 °C)   Temp src    SpO2 97 %   O2 Device None (Room air)       Current Vitals:   Vital Signs  BP: (!) 138/92  Pulse: 91  Resp: 26  Temp: 98.1 °F (36.7 °C)    Oxygen Therapy  SpO2: 98 %  O2 Device: None (Room air)        Physical Exam  Physical Exam   Constitutional: Awake alert, slight tachypnea but not struggling.  Sitting upright.  Head: Normocephalic and atraumatic.   Eyes: Conjunctivae are normal. Pupils are equal, round, and reactive to light.   Neck: Normal range of motion. No JVD  Cardiovascular: Normal rate, regular rhythm  Pulmonary/Chest: Tachypneic, sitting upright but no accessory muscle use, bibasilar crackles.  Abdominal: Soft. Not distended.  Neurological: Pt is alert and oriented to person, place, and time. no cranial nerve deficits. Speech fluent    No significant lower extremity edema      ED Course     Labs Reviewed   COMP METABOLIC PANEL (14) - Abnormal;  Notable for the following components:       Result Value    Glucose 128 (*)     Potassium 5.7 (*)     CO2 20.0 (*)     BUN 75 (*)     Creatinine 10.70 (*)     Calcium, Total 7.8 (*)     Calculated Osmolality 308 (*)     eGFR-Cr 5 (*)     ALT 10 (*)     A/G Ratio 0.9 (*)     All other components within normal limits   CBC WITH DIFFERENTIAL WITH PLATELET - Abnormal; Notable for the following components:    RBC 2.71 (*)     HGB 8.7 (*)     HCT 27.7 (*)     .2 (*)     All other components within normal limits   LACTIC ACID, PLASMA - Normal   BLOOD CULTURE   BLOOD CULTURE     EKG    Rate, intervals and axes as noted on EKG Report.  Rate: 99  Rhythm: Sinus Rhythm  Reading: Sinus rhythm with old ST changes stable from previous EKGs.  Acute ischemia not suggested.                Blood work notable for potassium 5.7 remainder of derangements consistent with a CKD.  Lactic acid fine.  Blood culture sent.    XR CHEST AP PORTABLE  (CPT=71045)    Result Date: 10/17/2024  CONCLUSION:    Worsening appearance, now with development of moderate patchy infiltrate right base with redemonstration elevated right diaphragm, and blunting the costophrenic angle consistent with developing pneumonia and effusion on the right.  Left chest stable.  Stable cardiac enlargement.  No pneumothorax.  No other significant change.   LOCATION:  [facility]      Dictated by (CST): Joe London MD on 10/17/2024 at 8:15 AM     Finalized by (CST): Joe London MD on 10/17/2024 at 8:15 AM          Medications   nitroGLYCERIN in dextrose 5% 50 mg/250mL infusion premix (20 mcg/min Intravenous Rate/Dose Change 10/17/24 0908)   sodium zirconium cyclosilicate (Lokelma) oral packet 10 g (has no administration in time range)   nitroglycerin (Nitrostat) SL tab 0.4 mg (0.4 mg Sublingual Given 10/17/24 3731)   cefTRIAXone (Rocephin) 2 g in sodium chloride 0.9% 100 mL IVPB-ADDV (0 g Intravenous Stopped 10/17/24 0942)   furosemide (Lasix) 10 mg/mL injection 80  mg (80 mg Intravenous Given 10/17/24 0859)   cloNIDine (Catapres) tab 0.1 mg (0.1 mg Oral Given 10/17/24 0832)   hydrALAzine (Apresoline) 20 mg/mL injection 20 mg (20 mg Intravenous Given 10/17/24 0901)   fentaNYL (Sublimaze) 50 mcg/mL injection 50 mcg (50 mcg Intravenous Given 10/17/24 0920)   labetalol (Trandate) 5 mg/mL injection 20 mg (20 mg Intravenous Given 10/17/24 0957)            MDM          Differential diagnoses considered: Flash pulmonary edema, pneumonia, fluid overload, hypertensive emergency.    -My concern upon the patient's arrival was that he was spiraling into flash pulmonary edema.  As such we gave him some nitroglycerin as we gave him back some of his usual antihypertensives as well as an additional dose of labetalol.  Blood pressure transitioned down nicely and it looks like we will be able to keep this at a low dose or shorted off prior to transfer.    -Chest x-ray shows pneumonia, patient has had a cough for a couple of days and he states he has had pneumonia before.  Will cover him some of Rocephin.    With regards to his hyperkalemia, there is no EKG changes as a result of this (PTA stable).  Lasix, Lokelma ordered.-    Discussed with Dr. Bond, nephrology, will see patient upon arrival in [place].    Patient will be admitted primarily to the [facility] hospitalist.  Care has been discussed with the admitting physician, Dr. Freedman      [id number]  I did eval the patient is prior to transfer.  As I walked in the room, he was laying back nearly flat using his cell phone.  Looks much better.  Will transition off nitro.          I visualized the radiology studies, my independent interpretation: Pneumonia suggested in the right base    *Discussion of ongoing management of this patient's care included:  nephrology, admitting physician  *Comorbidities contributing to the complexity of decision making:  ESRD, hypertension      A total of 45 minutes of critical care time (exclusive of billable procedures)  was administered to manage the patient's unstable vital signs, respiratory instability, and cardiovascular instability due to his hypertensive emergency necessitating nitroglycerin drip.  This involved direct patient intervention, complex decision making, and/or extensive discussions with the patient, family, and clinical staff.        Admission disposition: 10/17/2024  8:45 AM           Medical Decision Making      Disposition and Plan     Clinical Impression:  1. Pneumonia of right lower lobe due to infectious organism    2. Hypertensive emergency    3. ESRD (end stage renal disease) on dialysis (HCC)    4. Hyperkalemia         Disposition:  Admit  10/17/2024  8:45 am    Follow-up:  No follow-up provider specified.        Medications Prescribed:  Current Discharge Medication List              Supplementary Documentation:         Hospital Problems       Present on Admission  Date Reviewed: 10/8/2024            ICD-10-CM Noted POA    * (Principal) Pneumonia of right lower lobe due to infectious organism J18.9 10/17/2024 Unknown

## 2024-10-18 LAB
ALBUMIN SERPL-MCNC: 4.5 G/DL (ref 3.2–4.8)
ANION GAP SERPL CALC-SCNC: 13 MMOL/L (ref 0–18)
BASOPHILS # BLD AUTO: 0.08 X10(3) UL (ref 0–0.2)
BASOPHILS NFR BLD AUTO: 1.2 %
BUN BLD-MCNC: 38 MG/DL (ref 9–23)
CALCIUM BLD-MCNC: 8.9 MG/DL (ref 8.7–10.4)
CHLORIDE SERPL-SCNC: 101 MMOL/L (ref 98–112)
CO2 SERPL-SCNC: 24 MMOL/L (ref 21–32)
CREAT BLD-MCNC: 6.36 MG/DL
EGFRCR SERPLBLD CKD-EPI 2021: 10 ML/MIN/1.73M2 (ref 60–?)
EOSINOPHIL # BLD AUTO: 0.39 X10(3) UL (ref 0–0.7)
EOSINOPHIL NFR BLD AUTO: 6 %
ERYTHROCYTE [DISTWIDTH] IN BLOOD BY AUTOMATED COUNT: 15.2 %
GLUCOSE BLD-MCNC: 117 MG/DL (ref 70–99)
GLUCOSE BLD-MCNC: 168 MG/DL (ref 70–99)
HCT VFR BLD AUTO: 30.9 %
HGB BLD-MCNC: 10.3 G/DL
IMM GRANULOCYTES # BLD AUTO: 0.02 X10(3) UL (ref 0–1)
IMM GRANULOCYTES NFR BLD: 0.3 %
LYMPHOCYTES # BLD AUTO: 1.16 X10(3) UL (ref 1–4)
LYMPHOCYTES NFR BLD AUTO: 17.8 %
MCH RBC QN AUTO: 32.1 PG (ref 26–34)
MCHC RBC AUTO-ENTMCNC: 33.3 G/DL (ref 31–37)
MCV RBC AUTO: 96.3 FL
MONOCYTES # BLD AUTO: 0.76 X10(3) UL (ref 0.1–1)
MONOCYTES NFR BLD AUTO: 11.7 %
NEUTROPHILS # BLD AUTO: 4.09 X10 (3) UL (ref 1.5–7.7)
NEUTROPHILS # BLD AUTO: 4.09 X10(3) UL (ref 1.5–7.7)
NEUTROPHILS NFR BLD AUTO: 63 %
OSMOLALITY SERPL CALC.SUM OF ELEC: 296 MOSM/KG (ref 275–295)
PHOSPHATE SERPL-MCNC: 7.3 MG/DL (ref 2.4–5.1)
PLATELET # BLD AUTO: 197 10(3)UL (ref 150–450)
POTASSIUM SERPL-SCNC: 4.1 MMOL/L (ref 3.5–5.1)
RBC # BLD AUTO: 3.21 X10(6)UL
SODIUM SERPL-SCNC: 138 MMOL/L (ref 136–145)
WBC # BLD AUTO: 6.5 X10(3) UL (ref 4–11)

## 2024-10-18 PROCEDURE — 85025 COMPLETE CBC W/AUTO DIFF WBC: CPT | Performed by: INTERNAL MEDICINE

## 2024-10-18 PROCEDURE — 3E03317 INTRODUCTION OF OTHER THROMBOLYTIC INTO PERIPHERAL VEIN, PERCUTANEOUS APPROACH: ICD-10-PCS | Performed by: INTERNAL MEDICINE

## 2024-10-18 PROCEDURE — 82962 GLUCOSE BLOOD TEST: CPT

## 2024-10-18 PROCEDURE — 94640 AIRWAY INHALATION TREATMENT: CPT

## 2024-10-18 PROCEDURE — 80069 RENAL FUNCTION PANEL: CPT | Performed by: INTERNAL MEDICINE

## 2024-10-18 PROCEDURE — 90935 HEMODIALYSIS ONE EVALUATION: CPT | Performed by: INTERNAL MEDICINE

## 2024-10-18 RX ORDER — HYDROMORPHONE HYDROCHLORIDE 1 MG/ML
0.2 INJECTION, SOLUTION INTRAMUSCULAR; INTRAVENOUS; SUBCUTANEOUS EVERY 4 HOURS PRN
Status: DISCONTINUED | OUTPATIENT
Start: 2024-10-18 | End: 2024-10-22

## 2024-10-18 RX ORDER — CYCLOBENZAPRINE HCL 10 MG
10 TABLET ORAL 3 TIMES DAILY PRN
Status: DISCONTINUED | OUTPATIENT
Start: 2024-10-18 | End: 2024-10-22

## 2024-10-18 RX ORDER — SEVELAMER CARBONATE 800 MG/1
1600 TABLET, FILM COATED ORAL
Status: DISCONTINUED | OUTPATIENT
Start: 2024-10-18 | End: 2024-10-22

## 2024-10-18 RX ORDER — OXYCODONE HYDROCHLORIDE 5 MG/1
10 TABLET ORAL EVERY 4 HOURS PRN
Status: DISCONTINUED | OUTPATIENT
Start: 2024-10-18 | End: 2024-10-22

## 2024-10-18 RX ORDER — HYDROMORPHONE HYDROCHLORIDE 1 MG/ML
0.4 INJECTION, SOLUTION INTRAMUSCULAR; INTRAVENOUS; SUBCUTANEOUS EVERY 4 HOURS PRN
Status: DISCONTINUED | OUTPATIENT
Start: 2024-10-18 | End: 2024-10-22

## 2024-10-18 RX ORDER — HEPARIN SODIUM 1000 [USP'U]/ML
1.5 INJECTION, SOLUTION INTRAVENOUS; SUBCUTANEOUS
Status: COMPLETED | OUTPATIENT
Start: 2024-10-18 | End: 2024-10-18

## 2024-10-18 RX ORDER — ALBUMIN (HUMAN) 12.5 G/50ML
25 SOLUTION INTRAVENOUS
Status: ACTIVE | OUTPATIENT
Start: 2024-10-18 | End: 2024-10-20

## 2024-10-18 RX ORDER — HYDROMORPHONE HYDROCHLORIDE 1 MG/ML
0.8 INJECTION, SOLUTION INTRAMUSCULAR; INTRAVENOUS; SUBCUTANEOUS EVERY 4 HOURS PRN
Status: DISCONTINUED | OUTPATIENT
Start: 2024-10-18 | End: 2024-10-22

## 2024-10-18 RX ORDER — HYDRALAZINE HYDROCHLORIDE 50 MG/1
50 TABLET, FILM COATED ORAL EVERY 8 HOURS SCHEDULED
Status: DISCONTINUED | OUTPATIENT
Start: 2024-10-18 | End: 2024-10-22

## 2024-10-18 NOTE — PLAN OF CARE
Patient alert and oriented; VSS; cardiac monitor showing SR/ST- highest 106 BPM; lung sounds diminished, on room air, no cough noted; no edema; AV fistula to left arm which has bruit and thrill but not in use as maturing, left arm precautions; continent of bowel and bladder with LBM 10/16; patient up with a standby assist, but has been sleeping this morning; permacath to right chest with dressing that is clean/dry/intact with CHG bath done per PCT; patient to have dialysis, order called in and per Dialysis RN, will be here aroung 1430-Dr Bond informed and morning hydralazine held for dialysis.    1625: Per the HD RN, patient's permacath had decreased blood flow rate. Messaged Dr Bond to inform- received order for Alteplase to dwell in the permacath. Alteplase administered as ordered, to dwell for 1 hour.     1945: Patient sleeping this evening, on HD. During bedside report, patient complained of pain to neck, 8/10 and patient wanted dilaudid. Explained that Dr Freedman ordered oxycodone which needs to be given before can give IV pain medications.         Problem: CARDIOVASCULAR - ADULT  Goal: Maintains optimal cardiac output and hemodynamic stability  Description: INTERVENTIONS:  - Monitor vital signs, rhythm, and trends  - Monitor for bleeding, hypotension and signs of decreased cardiac output  - Evaluate effectiveness of vasoactive medications to optimize hemodynamic stability  - Monitor arterial and/or venous puncture sites for bleeding and/or hematoma  - Assess quality of pulses, skin color and temperature  - Assess for signs of decreased coronary artery perfusion - ex. Angina  - Evaluate fluid balance, assess for edema, trend weights  Outcome: Progressing  Goal: Absence of cardiac arrhythmias or at baseline  Description: INTERVENTIONS:  - Continuous cardiac monitoring, monitor vital signs, obtain 12 lead EKG if indicated  - Evaluate effectiveness of antiarrhythmic and heart rate control medications as ordered  -  Initiate emergency measures for life threatening arrhythmias  - Monitor electrolytes and administer replacement therapy as ordered  Outcome: Progressing     Problem: PAIN - ADULT  Goal: Verbalizes/displays adequate comfort level or patient's stated pain goal  Description: INTERVENTIONS:  - Encourage pt to monitor pain and request assistance  - Assess pain using appropriate pain scale  - Administer analgesics based on type and severity of pain and evaluate response  - Implement non-pharmacological measures as appropriate and evaluate response  - Consider cultural and social influences on pain and pain management  - Manage/alleviate anxiety  - Utilize distraction and/or relaxation techniques  - Monitor for opioid side effects  - Notify MD/LIP if interventions unsuccessful or patient reports new pain  - Anticipate increased pain with activity and pre-medicate as appropriate  Outcome: Progressing     Problem: Patient/Family Goals  Goal: Patient/Family Long Term Goal  Description: Patient's Long Term Goal: discharge home    Interventions:  - Hospitalist/Renal, labs, STAT dialysis, scheduled bp meds, prn pain meds, IV abx, daily weight, expanded flu panel, nitroglycerin gtt  - See additional Care Plan goals for specific interventions  Outcome: Progressing  Goal: Patient/Family Short Term Goal  Description: Patient's Short Term Goal: control blood pressure    Interventions:   - scheduled bp meds, prn pain meds, STAT dialysis  - See additional Care Plan goals for specific interventions  Outcome: Progressing

## 2024-10-18 NOTE — PLAN OF CARE
Pt admitted from [facility].  Presented to ER for complaint of shortness of breathe.  States he missed dialysis on Wednesday because he was not feeling well.  Reports he has been more tired and achy lately and has jillian having a sore throat and headaches..  Extended flu panel ordered and is negative.  Pt alert and oriented x 4.  Per hospitalist pt is diet controlled diabetic and no need for accuchecks at this time.  Continuous tele monitoring in place.  ST on the monitor.  Reports pain to neck and upper back area.  PRN dilaudid given with minimal improvement to pain.  Pt also given heat packs for comfort and extra pillow which he said is helping.   Tachypnea noted.  Pt hypertensive.  Er renal do not treat elevated bp until after dialysis.  HD at present.  Per renal plan will be HD tomorrow as well.   Room air.   Safety and comfort maintained.  Will continue to monitor.      Problem: CARDIOVASCULAR - ADULT  Goal: Maintains optimal cardiac output and hemodynamic stability  Description: INTERVENTIONS:  - Monitor vital signs, rhythm, and trends  - Monitor for bleeding, hypotension and signs of decreased cardiac output  - Evaluate effectiveness of vasoactive medications to optimize hemodynamic stability  - Monitor arterial and/or venous puncture sites for bleeding and/or hematoma  - Assess quality of pulses, skin color and temperature  - Assess for signs of decreased coronary artery perfusion - ex. Angina  - Evaluate fluid balance, assess for edema, trend weights  Outcome: Progressing  Goal: Absence of cardiac arrhythmias or at baseline  Description: INTERVENTIONS:  - Continuous cardiac monitoring, monitor vital signs, obtain 12 lead EKG if indicated  - Evaluate effectiveness of antiarrhythmic and heart rate control medications as ordered  - Initiate emergency measures for life threatening arrhythmias  - Monitor electrolytes and administer replacement therapy as ordered  Outcome: Progressing     Problem: PAIN -  ADULT  Goal: Verbalizes/displays adequate comfort level or patient's stated pain goal  Description: INTERVENTIONS:  - Encourage pt to monitor pain and request assistance  - Assess pain using appropriate pain scale  - Administer analgesics based on type and severity of pain and evaluate response  - Implement non-pharmacological measures as appropriate and evaluate response  - Consider cultural and social influences on pain and pain management  - Manage/alleviate anxiety  - Utilize distraction and/or relaxation techniques  - Monitor for opioid side effects  - Notify MD/LIP if interventions unsuccessful or patient reports new pain  - Anticipate increased pain with activity and pre-medicate as appropriate  Outcome: Progressing

## 2024-10-18 NOTE — PLAN OF CARE
[employer] [facility]  Edilberto Villa (MOB IV), [address] [facility] [place], [address]  Phone: [phone] Fax: [phone]    Discharge Summary 2-15    Patient name: Brian Goldman  : 1947  Provider#: [id number]  Referral source: Sixto Robertson MD      Medical/Treatment Diagnosis: Bilateral primary osteoarthritis of knee [M17.0]  Other symptoms and signs involving the musculoskeletal system [R29.898]     Prior Hospitalization: see medical history     Comorbidities: See Plan of Care  Prior Level of Function: See Plan of Care  Medications: Verified on Patient Summary List    Start of Care: 17      Onset Date: 2017   Visits from Start of Care: 6     Missed Visits: Cancelled 1  Reporting Period : 17 to 17    Assessment/Summary of care: The Pt has been seen for 6 outpatient physical therapy sessions with bilateral (left > right) knee pain following a fall from an 8 ft elevated surface in 2017. The Pt has met all of his therapeutic goals and progressed very well with his therapy program that has focused on improving his hip abduction and extension strength and stability, improving his gluteal activation with dynamic movements, improving his core strength and stability, stretching his hip musculature, and improving his activity tolerance and endurance via therapeutic exercises. The Pt believes that he is 75% improved since beginning therapy because his right knee no longer gives him any discomfort and he feels stronger, but his left knee continues to bother him with his ADLs and work duties. He states that the pain is mild during the day, but at night it is more moderate and throbs more frequently. At this time, it is believed that the Pt has reached maximum benefit from skilled PT and will continue to progress well independently with his HEP. He has been educated how to safely progress his HEP independently and voiced understanding.   The Pt is Patient alert and oriented x4. Up with standby assist. ST on tele. Maintaining o2 sats on RA. No c/o SOB. Tachypneic. Elevated bp during shift. Scheduled bp meds given and prn hydralazine. Blood pressure this am controlled. Continent of bowel/bladder, uses urinal. C/o severe neck pain, prn dilaudid given with some relief. Provided hot packs. Left arm precautions d/t AV fistula. R permacath for dialysis. Removed 4L yesterday during dialysis, tolerated well. IV abx given. Updated patient on POC, verbalized understanding. Safety precautions put in place, bed alarm on.     Problem: CARDIOVASCULAR - ADULT  Goal: Maintains optimal cardiac output and hemodynamic stability  Description: INTERVENTIONS:  - Monitor vital signs, rhythm, and trends  - Monitor for bleeding, hypotension and signs of decreased cardiac output  - Evaluate effectiveness of vasoactive medications to optimize hemodynamic stability  - Monitor arterial and/or venous puncture sites for bleeding and/or hematoma  - Assess quality of pulses, skin color and temperature  - Assess for signs of decreased coronary artery perfusion - ex. Angina  - Evaluate fluid balance, assess for edema, trend weights  Outcome: Progressing  Goal: Absence of cardiac arrhythmias or at baseline  Description: INTERVENTIONS:  - Continuous cardiac monitoring, monitor vital signs, obtain 12 lead EKG if indicated  - Evaluate effectiveness of antiarrhythmic and heart rate control medications as ordered  - Initiate emergency measures for life threatening arrhythmias  - Monitor electrolytes and administer replacement therapy as ordered  Outcome: Progressing     Problem: PAIN - ADULT  Goal: Verbalizes/displays adequate comfort level or patient's stated pain goal  Description: INTERVENTIONS:  - Encourage pt to monitor pain and request assistance  - Assess pain using appropriate pain scale  - Administer analgesics based on type and severity of pain and evaluate response  - Implement  discharged from skilled PT at this time and will contact his referring provider with any further questions or concerns. Thank you for this referral.    Maggy Spruce towards goals / Updated goals:  Short Term Goals: To be accomplished in 2 treatments:  1. The Pt will be independent and compliant with her HEP- met  Long Term Goals: To be accomplished in 5 treatments:  1. The Pt will score the MCII on his FOTO survey demonstrating improved overall function (45 to 51 points)- met (FOTO 72/100)  2. The Pt will be able to ascend and descend 1 standard flight of stairs with 0-2/10 pain to allow the Pt to be able to perform work duties with less pain- met  3. The Pt will be able to perform 5 consecutive sit to stands with 0-2/10 pain to allow the Pt to transfer with less discomfort- me    Progress towards goals / Updated goals:  Short Term Goals: To be accomplished in 2 treatments:  1. The Pt will be independent and compliant with her HEP- met  Long Term Goals: To be accomplished in 5 treatments:  1. The Pt will score the MCII on his FOTO survey demonstrating improved overall function (45 to 51 points)- met (FOTO 72/100)  2. The Pt will be able to ascend and descend 1 standard flight of stairs with 0-2/10 pain to allow the Pt to be able to perform work duties with less pain- met  3. The Pt will be able to perform 5 consecutive sit to stands with 0-2/10 pain to allow the Pt to transfer with less discomfort- met    G-Codes (GP)  Mobility    Goal  CJ= 20-39%  D/C  CJ= 20-39%    The severity rating is based on clinical judgment and the FOTO Score score.     RECOMMENDATIONS:  [x]Discontinue therapy: [x]Patient has reached or is progressing toward set goals     []Patient is non-compliant or has abdicated     []Due to lack of appreciable progress towards set goals     []Other    Xu Jeffery, PT 8/21/2017 11:55 AM non-pharmacological measures as appropriate and evaluate response  - Consider cultural and social influences on pain and pain management  - Manage/alleviate anxiety  - Utilize distraction and/or relaxation techniques  - Monitor for opioid side effects  - Notify MD/LIP if interventions unsuccessful or patient reports new pain  - Anticipate increased pain with activity and pre-medicate as appropriate  Outcome: Progressing     Problem: Patient/Family Goals  Goal: Patient/Family Long Term Goal  Description: Patient's Long Term Goal: discharge home    Interventions:  - Hospitalist/Renal, labs, STAT dialysis, scheduled bp meds, prn pain meds, IV abx, daily weight, expanded flu panel, nitroglycerin gtt  - See additional Care Plan goals for specific interventions  Outcome: Progressing  Goal: Patient/Family Short Term Goal  Description: Patient's Short Term Goal: control blood pressure    Interventions:   - scheduled bp meds, prn pain meds, STAT dialysis  - See additional Care Plan goals for specific interventions  Outcome: Progressing

## 2024-10-18 NOTE — PROGRESS NOTES
[facility]   part of [facility] Hospitalist Progress Note     Godwin Fonseca Patient Status:  Inpatient    1980 MRN [id number]   Location [facility] 2NE-A Attending Phong Freedman, DO   Hosp Day # 1 PCP Adrian Small MD     Follow Up:  The primary encounter diagnosis was Pneumonia of right lower lobe due to infectious organism. Diagnoses of Hypertensive emergency, ESRD (end stage renal disease) on dialysis (HCC), and Hyperkalemia were also pertinent to this visit.    Subjective:     Patient seen and examined.  States his breathing is improved.   C/o severe neck pain with radicular symptoms.  No F/C, N/V.    Objective:    Review of Systems:   10 point ROS completed and was negative, except for pertinent positive and negatives stated in subjective.    Vital signs:  Temp:  [97.7 °F (36.5 °C)-98 °F (36.7 °C)] 97.7 °F (36.5 °C)  Pulse:  [] 99  Resp:  [20-28] 20  BP: (132-194)/() 144/85  SpO2:  [93 %-98 %] 93 %    Physical Exam:    Gen: No acute distress, alert and oriented x3, no focal neurologic deficits.  Chronically ill appearing male.    HEENT:  EOMI, PERRLA, OP clear, MMM  Pulm:  +R basilar crackles, normal respiratory effort  CV: Heart with regular rate and rhythm, no murmur.  Normal PMI.    Abd: Abdomen soft, nontender, nondistended, no organomegaly, bowel sounds present  MSK: Full range of motion in extremities, no clubbing, no cyanosis  Skin: no rashes or lesions  Neuro:  Grossly intact, no focal deficits  Lines: RIJ CVC  LUE AVF         Diagnostic Data:    Labs:  Recent Labs   Lab 10/17/24  0744 10/18/24  0554   WBC 7.6 6.5   HGB 8.7* 10.3*   .2* 96.3   .0 197.0       Recent Labs   Lab 10/17/24  0744 10/18/24  0554   * 117*   BUN 75* 38*   CREATSERUM 10.70* 6.36*   CA 7.8* 8.9   ALB 3.4 4.5    138   K 5.7* 4.1    101   CO2 20.0* 24.0   ALKPHO 70  --    AST 33  --    ALT 10*  --    BILT 0.4  --    TP 7.0  --         Estimated Creatinine Clearance: 16.9 mL/min (A) (based on SCr of 6.36 mg/dL (H)).    No results for input(s): \"PTP\", \"INR\" in the last 168 hours.         COVID-19 Lab Results    COVID-19  Lab Results   Component Value Date    COVID19 Not Detected 10/17/2024    COVID19 Not Detected 09/28/2024    COVID19 Not Detected 09/15/2024       Pro-Calcitonin  No results for input(s): \"PCT\" in the last 168 hours.    Cardiac  No results for input(s): \"TROP\", \"PBNP\" in the last 168 hours.    Creatinine Kinase  No results for input(s): \"CK\" in the last 168 hours.    Inflammatory Markers  No results for input(s): \"CRP\", \"HARJEET\", \"LDH\", \"DDIMER\" in the last 168 hours.    Imaging: Imaging data reviewed in Epic.    Medications:    hydrALAZINE  50 mg Oral Q8H MARTHA    sevelamer carbonate  1,600 mg Oral TID CC    heparin  1.5 mL Intracatheter Once    ARIPiprazole  15 mg Oral Daily    buPROPion ER  150 mg Oral Daily    fenofibrate micronized  134 mg Oral Nightly    FLUoxetine HCl  40 mg Oral Daily    fluticasone propionate  1 spray Each Nare Daily    isosorbide mononitrate ER  30 mg Oral Daily    lamoTRIgine  150 mg Oral Daily    losartan  100 mg Oral Daily    memantine  5 mg Oral BID    NIFEdipine ER  60 mg Oral BID    tamsulosin  0.4 mg Oral Daily    umeclidinium bromide  1 puff Inhalation Daily    piperacillin-tazobactam  3.375 g Intravenous Q12H    heparin  5,000 Units Subcutaneous Q8H MARTHA    carvedilol  25 mg Oral BID    [Held by provider] Lisdexamfetamine Dimesylate  60 mg Oral Daily       Assessment & Plan:      44 yr old male with PMH sig for  ESRD on HD (M/W/F), bipolar, depression, DM 2, HTN, DL, CAD, COPD, HFpEF, and chronic abdominal pain who presented to the ED for evaluation of cough and SOB.      # Hypertensive emergency   - s/p NGT gtt  - HD today per renal  - restart home BP meds coreg 25 mg BID, imdur 30 mg/d, losartan 100 mg/d, nifedipine XL 60 mg/d.  Hydralazine increased to TID.  - clonidine not restarted   - PRN IV  labetalol and hydralazine      # Suspected RLL pneumonia   - cont empiric zosyn for cover for HCAP  - expanded flu panel neg   - f/u Bcx's     # ESRD on HD  - cont HD per renal  - renal c/s appreciated      # Chronic diastolic HF  - volume control with HD per renal      # Hx of GI bleed  - seen by GI last admit  - f/u with GI for capsule study  - hemorrhoid surgery when possible      # Chronic abd pain  # Chronic back pain  # Opioid dependence   - follows with pain clinic as outpt   - change norco to PRN oxy IR.  Add flexeril PRN  - wean off IV narcotics when possible   - pt to f/u with spine surgeon      # HLD  - cont statin     # COPD  - no evidence of acute exacerbation   - resume home inhalers      # Major depression, recurrent   # Bipolar d/o  - stable  - resume home medications     Plan of care: inpt care.  POC d/w pt who agrees.     Plan of care discussed with patient or family at bedside.    Phong Freedman, DO    Supplementary Documentation:     Quality:  DVT Prophylaxis: hep subcutaneous   CODE status: FULL  Abbott: no  Central line: yes  If COVID testing is negative, may discontinue isolation: yes     Estimated date of discharge: likely tomorrow   Discharge is dependent on: clinical course   At this point Mr. Fonseca is expected to be discharge to: home    Plan of care discussed with pt

## 2024-10-18 NOTE — CM/SW NOTE
10/18/24 1300   CM/SW Referral Data   Referral Source Social Work (self-referral)   Reason for Referral Discharge planning   Informant EMR;Clinical Staff Member;Patient   Readmission Assessment   Pt's living situation prior to admission? Home with family   Pt's level of independence at discharge? No assist/independent (minimal)   Medical Hx   Does patient have an established PCP? Yes   Patient Info   Patient's Current Mental Status at Time of Assessment Alert;Oriented   Patient's Home Environment House   Number of Levels in Home 2   Patient lives with Parent(s)   Patient Status Prior to Admission   Independent with ADLs and Mobility Yes   Services in place prior to admission Dialysis   Dialysis Clinic [facility]   Scheduled Dialysis days M-W-F   Dialysis scheduled time 1045   Discharge Needs   Anticipated D/C needs Outpatient dialysis     Patient is a 45 y/o male admitted for pneumonia of right lower lobe due to infectious organism. SW noted and acknowledged consult order to assist with changing pt's outpatient HD unit.    SW met with pt at bedside to discuss DC plan. Pt reports he lives with his mother in a 2 level house. Pt denied owning any DME and reports being independent with ADLs and mobility.    SW discussed pt's outpatient HD. Pt reports he is currently going for HD with [facility] at their [place] location. Pt stated his scheduled there is MWF at 10:45am. Pt is interested in switching units and would prefer the [facility] location or the [facility] location. Pt emphasized that he would like to keep his MWF scheduled and would like a chair time on the 1st or 2nd shift. SW stated she would reach out to [facility] to discuss transferring pt to either of his preferred locations.    Pt also expressed interest in completing POA. SEFERINO stated she would place a consult order for Spiritual Care to come and meet with pt to complete POA paperwork. Pt thanked SEFERINO and denied having any further  questions at this time.    [facility]FERINO [facility], Mary Lou ([phone]), and discussed pt's desire to switch clinics & his preferred schedule. Mary Lou stated she would reach out to pt's current [facility] and request for them to send pt's information to his preferred clinics. Mary Lou stated she would call SW back with updates when available.    SW placed consult order for Spiritual Care to complete POA paperwork.     to remain available for support and/or discharge planning.    Addendum: SEFERINO called Mary Lou with [facility] ([phone]) inquiring about the status in the change in clinics. Mary Lou stated that pt's current [facility] clinic was going to be sending over transfer information/paperwork to pt's preferred [facility]. Mary Lou stated that no chair time has been approved or finalized. Mary Lou requested for SW to follow up with [facility] or [facility] tomorrow for chair time information.    Elisabeth Perla, [facility]  Discharge Planner  [phone]

## 2024-10-18 NOTE — SPIRITUAL CARE NOTE
Spiritual Care Visit Note    Patient Name: Godwin Fonseca Date of Spiritual Care Visit: 10/18/24   : 1980 Primary Dx: Pneumonia of right lower lobe due to infectious organism       Referred By: Referral From: Nurse    Spiritual Care Taxonomy:    Intended Effects: Convey a calming presence    Methods: Offer support    Interventions: Acknowledge current situation;Active listening;Silent prayer    Visit Type/Summary:  introduced self and POA[facility] paper work to the patient; however the patient is not ready to complete it at this time. Handed over all the paperwork including POA[facility] instruction sheet. Encouraged patient to call Spiritual Care when he is ready to complete it.      - Spiritual Care: Responded to a request for spiritual care and assessed the patient for spiritual care needs. Consulted with RN prior to visit. Provided information regarding how to contact Spiritual Care and left a Spiritual Care information card.  - PoA: Other: Left PoA information and Spiritual Care contact information.    Spiritual Care support can be requested via an Epic consult. For urgent/immediate needs, please contact the On Call  at: [place]: ext [phone]

## 2024-10-18 NOTE — PROGRESS NOTES
[facility]   part of [facility]    Nephrology Progress Note    Godwin Fonseca Attending:  Phong Freedman DO       SUBJECTIVE:     Tolerated HD yesterday with 4L UF.  States breathing a little better but still has cough.    PHYSICAL EXAM:     Vital Signs: BP (!) 132/104 (BP Location: Right arm)   Pulse 94   Temp 97.9 °F (36.6 °C) (Oral)   Resp 20   Ht 188 cm (6' 2\")   Wt 245 lb 9.5 oz (111.4 kg)   SpO2 97%   BMI 31.53 kg/m²   Temp (24hrs), Av.9 °F (36.6 °C), Min:97.7 °F (36.5 °C), Max:98 °F (36.7 °C)       Intake/Output Summary (Last 24 hours) at 10/18/2024 1237  Last data filed at 10/18/2024 1057  Gross per 24 hour   Intake 580 ml   Output 4700 ml   Net -4120 ml     Wt Readings from Last 3 Encounters:   10/18/24 245 lb 9.5 oz (111.4 kg)   10/07/24 240 lb (108.9 kg)   24 245 lb (111.1 kg)          General: ill appearing  Skin: no visible rashes  HEENT: NCAT  Cardiac: Regular rate and rhythm, no murmur/gallop or rub  Lungs: R base diminished + crackles  Abdomen: Soft, NTND  Extremities: warm, well perfused, no leg edema  Neurologic/Psych: mentating well  RIJ CVC  LUE AVF + thrill and bruit but small     LABORATORY DATA:     Lab Results   Component Value Date     (H) 10/18/2024    BUN 38 (H) 10/18/2024    BUNCREA 13.0 2022    CREATSERUM 6.36 (H) 10/18/2024    ANIONGAP 13 10/18/2024    GFR 59 (L) 2018    GFRNAA 30 (L) 2022    GFRAA 35 (L) 2022    CA 8.9 10/18/2024    OSMOCALC 296 (H) 10/18/2024    ALKPHO 70 10/17/2024    AST 33 10/17/2024    ALT 10 (L) 10/17/2024    BILT 0.4 10/17/2024    TP 7.0 10/17/2024    ALB 4.5 10/18/2024    GLOBULIN 3.6 10/17/2024     10/18/2024    K 4.1 10/18/2024     10/18/2024    CO2 24.0 10/18/2024     Lab Results   Component Value Date    WBC 6.5 10/18/2024    RBC 3.21 (L) 10/18/2024    HGB 10.3 (L) 10/18/2024    HCT 30.9 (L) 10/18/2024    .0 10/18/2024    MPV 11.5 2012    MCV 96.3 10/18/2024    MCH 32.1  10/18/2024    MCHC 33.3 10/18/2024    RDW 15.2 10/18/2024    NEPRELIM 4.09 10/18/2024    NEPERCENT 63.0 10/18/2024    LYPERCENT 17.8 10/18/2024    MOPERCENT 11.7 10/18/2024    EOPERCENT 6.0 10/18/2024    BAPERCENT 1.2 10/18/2024    NE 4.09 10/18/2024    LYMABS 1.16 10/18/2024    MOABSO 0.76 10/18/2024    EOABSO 0.39 10/18/2024    BAABSO 0.08 10/18/2024     Lab Results   Component Value Date    MALBP 167.00 05/24/2023    CREUR 142.00 05/24/2023    CREUR 142.00 05/24/2023     Lab Results   Component Value Date    COLORUR Colorless (A) 09/16/2024    CLARITY Clear 09/16/2024    SPECGRAVITY 1.012 09/16/2024    GLUUR Normal 09/16/2024    BILUR Negative 09/16/2024    KETUR Negative 09/16/2024    BLOODURINE 3+ (A) 09/16/2024    PHURINE 6.0 09/16/2024    PROUR 50 (A) 09/16/2024    UROBILINOGEN Normal 09/16/2024    NITRITE Negative 09/16/2024    LEUUR Negative 09/16/2024    WBCUR 1-5 09/16/2024    RBCUR 0-2 09/16/2024    EPIUR Few (A) 09/16/2024    BACUR None Seen 09/16/2024    CAOXUR Occasional (A) 04/20/2018    HYLUR Present (A) 05/14/2019         IMAGING:     Reviewed.    MEDICATIONS:       Current Facility-Administered Medications   Medication Dose Route Frequency    hydrALAZINE (Apresoline) tab 50 mg  50 mg Oral Q8H MARTHA    sevelamer carbonate (Renvela) tab 1,600 mg  1,600 mg Oral TID CC    sodium chloride 0.9 % IV bolus 100 mL  100 mL Intravenous Q30 Min PRN    And    albumin human (Albumin) 25% injection 25 g  25 g Intravenous PRN Dialysis    heparin (Porcine) 1000 UNIT/ML injection 1,500 Units  1.5 mL Intracatheter Once    nitroGLYCERIN in dextrose 5% 50 mg/250mL infusion premix  5-400 mcg/min Intravenous Titrated    hydrALAzine (Apresoline) 20 mg/mL injection 10 mg  10 mg Intravenous Q6H PRN    labetalol (Trandate) 5 mg/mL injection 10 mg  10 mg Intravenous Q6H PRN    HYDROmorphone (Dilaudid) 1 MG/ML injection 0.2 mg  0.2 mg Intravenous Q2H PRN    Or    HYDROmorphone (Dilaudid) 1 MG/ML injection 0.4 mg  0.4 mg  Intravenous Q2H PRN    Or    HYDROmorphone (Dilaudid) 1 MG/ML injection 0.8 mg  0.8 mg Intravenous Q2H PRN    ondansetron (Zofran) 4 MG/2ML injection 4 mg  4 mg Intravenous Q6H PRN    acetaminophen (Tylenol) tab 650 mg  650 mg Oral Q6H PRN    sodium chloride 0.9 % IV bolus 100 mL  100 mL Intravenous Q30 Min PRN    And    albumin human (Albumin) 25% injection 25 g  25 g Intravenous PRN Dialysis    albuterol (Ventolin HFA) 108 (90 Base) MCG/ACT inhaler 2 puff  2 puff Inhalation Q6H PRN    ARIPiprazole (Abilify) tab 15 mg  15 mg Oral Daily    buPROPion ER (Wellbutrin XL) 24 hr tab 150 mg  150 mg Oral Daily    fenofibrate micronized (Lofibra) cap 134 mg  134 mg Oral Nightly    FLUoxetine (PROzac) cap 40 mg  40 mg Oral Daily    fluticasone propionate (Flonase) 50 MCG/ACT nasal suspension 1 spray  1 spray Each Nare Daily    isosorbide mononitrate ER (Imdur) 24 hr tab 30 mg  30 mg Oral Daily    lamoTRIgine (LaMICtal) tab 150 mg  150 mg Oral Daily    losartan (Cozaar) tab 100 mg  100 mg Oral Daily    memantine (Namenda) tab 5 mg  5 mg Oral BID    NIFEdipine ER (Procardia-XL) 24 hr tab 60 mg  60 mg Oral BID    tamsulosin (Flomax) cap 0.4 mg  0.4 mg Oral Daily    umeclidinium bromide (Incruse Ellipta) 62.5 MCG/ACT inhaler 1 puff  1 puff Inhalation Daily    piperacillin-tazobactam (Zosyn) 3.375 g in dextrose 5% 100 mL IVPB-ADDV  3.375 g Intravenous Q12H    heparin (Porcine) 5000 UNIT/ML injection 5,000 Units  5,000 Units Subcutaneous Q8H MARTHA    carvedilol (Coreg) tab 25 mg  25 mg Oral BID    [Held by provider] Lisdexamfetamine Dimesylate (VYVANSE) cap 60 mg  60 mg Oral Daily       ASSESSMENT/PLAN:     43 yo M with history of ESRD on iHD MWF via RIJ CVC, LUE AVF not in use, hyperaldosteronism, DM, biopolar disorder, recurrent GI bleed presented with cough/sob. Being treated for pneumonia and volume overload.     ESRD:  -- HD today, then continue MWF schedule  -- UF 3L  -- pt states today he would like to stay at his current HD  unit     LUE AVF:  -- L arm precautions     Anemia in ESRD:  -- defer OWEN hb 10.3     MBD:  -- resume sevelamer to 1600 TID AC     Hyperkalemia:  -- improved with HD     HTN:  -- s/p clonidine, lasix, hydralazine, labetalol, nitroglycerine gtt  -- home meds: coreg 25 mg BID, imdur 30 mg/d, losartan 100 mg/d, nifedipine XL 60 mg BID  -- hydralazine 50 changed to TID to avoid fluctuations  -- clonidine not restartted     Pneumonia:  -- started zosydemi    D/w RN. Will follow.     Addendum:  Low blood flow rates of 250-300 ml/min on HD.  Will use alteplase to declot line and then resume.  Add'l time 25 min.  D/w RN.  Nephrology will follow peripherally over the weekend as patient's next HD treatment tentatively would be Monday.        Thank you for allowing me to participate in the care of this patient. Please do not hesitate to call with questions or concerns.        Lenka Bond MD  [facility] Nephrology  [address] [place] [id number]    10/18/2024  12:37 PM

## 2024-10-18 NOTE — PAYOR COMM NOTE
--------------  ADMISSION REVIEW     Payor: [employer]  Subscriber #:  [id number]  Authorization Number: [id number]    Admit date: 10/17/24  Admit time: 11:26 AM       REVIEW DOCUMENTATION:     ED Provider Notes        ED Provider Notes signed by Stacy Shane MD at 10/17/2024 10:38 AM       Author: Stacy Shane MD Service: -- Author Type: Physician    Filed: 10/17/2024 10:38 AM Date of Service: 10/17/2024 10:28 AM Status: Signed    : Stacy Shane MD (Physician)           Patient Seen in: [facility] [place]      History     Chief Complaint   Patient presents with    Difficulty Breathing     Stated Complaint: miss dialysis LIZBETH    Subjective:   HPI      Patient has medical history notable low.  States has been cough for the last 2 days.  Did not feel well enough to go to dialysis yesterday.  Today he woke up very early in the morning and feels very short of breath.  Feels like he is overloaded.  No fevers no chills, states he is coughing up clear sputum.  Denies any chest pain.    Objective:     Past Medical History:    Asthma ([facility])    Attention deficit hyperactivity disorder (ADHD)    Back problem    Bipolar 1 disorder ([facility])    CKD (chronic kidney disease) stage 3, GFR 30-59 ml/min ([facility])    Dr Meeks    Congestive heart disease ([facility])    COPD (chronic obstructive pulmonary disease) ([facility])    Coronary atherosclerosis    Deep vein thrombosis ([facility])    at age 17 R/T cast    Depression    Diabetes ([facility])    Essential hypertension    3/21 echo: severe concentric LVH with normal EF and no MR or pHTN    Extrinsic asthma, unspecified    Heart attack ([facility])    2016- angiogram- no intervention    Heart valve disease    mitral valve repair in 1994/    High blood pressure    High cholesterol    History of blood transfusion    History of mitral valve repair    Hyperlipidemia    Low back pain    tight and stiff after sweeping and mopping    LVH (left ventricular hypertrophy)    Migraines     Mixed hyperlipidemia     HDL 38 LDL 97 VLDL 57     Monoclonal gammopathy    IgG kappa     MVP (mitral valve prolapse)    Repair  at [place]; echoes as recently as 3/21 show mild or trivial MR and no stenosis    Neuropathy    Osteoarthritis    hip ,knees    Pneumonia due to organism    Pulmonary embolism (HCC)    Renal disorder    Stroke (HCC)    TIA (transient ischemic attack)    Initial history of left-sided weakness and slurred speech. (+) cocaine. MRI of the brain, CT angiogram of the head and neck, and 2D echo are all unremarkable.     TMJ (dislocation of temporomandibular joint)    Troponin level elevated    Trop 60 60 47 with TIA and no CP: Lexiscan negative with EF 51              Past Surgical History:   Procedure Laterality Date    Av fistula revision, open Left     Colonoscopy N/A 2023    Procedure: COLONOSCOPY;  Surgeon: Heath Vu MD;  Location:  ENDOSCOPY    Colonoscopy N/A 2023    Procedure: COLONOSCOPY with cold snare polypectomy and forcep polypectomy;  Surgeon: Ousmane Suarez MD;  Location:  ENDOSCOPY    Colonoscopy & polypectomy      [facility]  2019    Duodenitis. Biopsied. EUS for weight loss was negative    Heart surgery      Hernia surgery  2022    Dr Barnes    Laminectomy,>2 sgmt,lumbar  2018    L4-L5 Decomp Discectomy ROEM L4-L5    Mitralplasty w cp bypass      [place]: Repair    Repair rotator cuff,chronic Left     torn and had a ruptured bicep    Valve repair      mitral valve                Social History     Socioeconomic History    Marital status:    Tobacco Use    Smoking status: Former     Current packs/day: 0.00     Average packs/day: 1 pack/day for 27.0 years (27.0 ttl pk-yrs)     Types: Cigarettes     Start date: 1995     Quit date: 2022     Years since quittin.6     Passive exposure: Never    Smokeless tobacco: Never   Vaping Use    Vaping status: Never Used   Substance and Sexual Activity    Alcohol use: No     Drug use: No     Social Drivers of Health     Financial Resource Strain: Medium Risk (5/7/2024)    Received from [facility]    Overall Financial Resource Strain (CARDIA)     Difficulty of Paying Living Expenses: Somewhat hard   Food Insecurity: No Food Insecurity (10/7/2024)    Food Insecurity     Food Insecurity: Never true   Transportation Needs: No Transportation Needs (10/7/2024)    Transportation Needs     Lack of Transportation: No   Physical Activity: Inactive (5/7/2024)    Received from [facility]    Exercise Vital Sign     Days of Exercise per Week: 0 days     Minutes of Exercise per Session: 0 min   Stress: Stress Concern Present (5/7/2024)    Received from [facility]    [employer] - Occupational Stress Questionnaire     Feeling of Stress : Rather much   Social Connections: Unknown (5/7/2024)    Received from [facility]    Social Connection and Isolation Panel [NHANES]     Frequency of Communication with Friends and Family: More than three times a week     Frequency of Social Gatherings with Friends and Family: More than three times a week     Attends [employer] Services: Never     Active Member of Clubs or Organizations: No     Attends Club or Organization Meetings: Never     Marital Status: Patient unable to answer   Housing Stability: Low Risk  (10/7/2024)    Housing Stability     Housing Instability: No                  Physical Exam     ED Triage Vitals [10/17/24 0703]   BP (!) 168/118   Pulse 106   Resp (!) 28   Temp 98.1 °F (36.7 °C)   Temp src    SpO2 97 %   O2 Device None (Room air)       Current Vitals:   Vital Signs  BP: (!) 138/92  Pulse: 91  Resp: 26  Temp: 98.1 °F (36.7 °C)    Oxygen Therapy  SpO2: 98 %  O2 Device: None (Room air)        Physical Exam  Physical Exam   Constitutional: Awake alert, slight tachypnea but not struggling.  Sitting upright.  Head: Normocephalic and atraumatic.   Eyes: Conjunctivae are normal.  Pupils are equal, round, and reactive to light.   Neck: Normal range of motion. No JVD  Cardiovascular: Normal rate, regular rhythm  Pulmonary/Chest: Tachypneic, sitting upright but no accessory muscle use, bibasilar crackles.  Abdominal: Soft. Not distended.  Neurological: Pt is alert and oriented to person, place, and time. no cranial nerve deficits. Speech fluent    No significant lower extremity edema      ED Course     Labs Reviewed   COMP METABOLIC PANEL (14) - Abnormal; Notable for the following components:       Result Value    Glucose 128 (*)     Potassium 5.7 (*)     CO2 20.0 (*)     BUN 75 (*)     Creatinine 10.70 (*)     Calcium, Total 7.8 (*)     Calculated Osmolality 308 (*)     eGFR-Cr 5 (*)     ALT 10 (*)     A/G Ratio 0.9 (*)     All other components within normal limits   CBC WITH DIFFERENTIAL WITH PLATELET - Abnormal; Notable for the following components:    RBC 2.71 (*)     HGB 8.7 (*)     HCT 27.7 (*)     .2 (*)     All other components within normal limits   LACTIC ACID, PLASMA - Normal   BLOOD CULTURE   BLOOD CULTURE     EKG    Rate, intervals and axes as noted on EKG Report.  Rate: 99  Rhythm: Sinus Rhythm  Reading: Sinus rhythm with old ST changes stable from previous EKGs.  Acute ischemia not suggested.                Blood work notable for potassium 5.7 remainder of derangements consistent with a CKD.  Lactic acid fine.  Blood culture sent.    XR CHEST AP PORTABLE  (CPT=71045)    Result Date: 10/17/2024  CONCLUSION:    Worsening appearance, now with development of moderate patchy infiltrate right base with redemonstration elevated right diaphragm, and blunting the costophrenic angle consistent with developing pneumonia and effusion on the right.  Left chest stable.  Stable cardiac enlargement.  No pneumothorax.  No other significant change.   LOCATION:  [facility]      Dictated by (CST): Joe London MD on 10/17/2024 at 8:15 AM     Finalized by (CST): Joe London MD on 10/17/2024  at 8:15 AM          Medications   nitroGLYCERIN in dextrose 5% 50 mg/250mL infusion premix (20 mcg/min Intravenous Rate/Dose Change 10/17/24 0951)   sodium zirconium cyclosilicate (Lokelma) oral packet 10 g (has no administration in time range)   nitroglycerin (Nitrostat) SL tab 0.4 mg (0.4 mg Sublingual Given 10/17/24 0731)   cefTRIAXone (Rocephin) 2 g in sodium chloride 0.9% 100 mL IVPB-ADDV (0 g Intravenous Stopped 10/17/24 0942)   furosemide (Lasix) 10 mg/mL injection 80 mg (80 mg Intravenous Given 10/17/24 0859)   cloNIDine (Catapres) tab 0.1 mg (0.1 mg Oral Given 10/17/24 0832)   hydrALAzine (Apresoline) 20 mg/mL injection 20 mg (20 mg Intravenous Given 10/17/24 0901)   fentaNYL (Sublimaze) 50 mcg/mL injection 50 mcg (50 mcg Intravenous Given 10/17/24 0920)   labetalol (Trandate) 5 mg/mL injection 20 mg (20 mg Intravenous Given 10/17/24 0957)           MDM          Differential diagnoses considered: Flash pulmonary edema, pneumonia, fluid overload, hypertensive emergency.    -My concern upon the patient's arrival was that he was spiraling into flash pulmonary edema.  As such we gave him some nitroglycerin as we gave him back some of his usual antihypertensives as well as an additional dose of labetalol.  Blood pressure transitioned down nicely and it looks like we will be able to keep this at a low dose or shorted off prior to transfer.    -Chest x-ray shows pneumonia, patient has had a cough for a couple of days and he states he has had pneumonia before.  Will cover him some of Rocephin.    With regards to his hyperkalemia, there is no EKG changes as a result of this (PTA stable).  Lasix, Lokelma ordered.-    Discussed with Dr. Bond, nephrology, will see patient upon arrival in [place].    Patient will be admitted primarily to the [facility] hospitalist.  Care has been discussed with the admitting physician, Dr. Freedman      0620  I did eval the patient is prior to transfer.  As I walked in the room, he was  laying back nearly flat using his cell phone.  Looks much better.  Will transition off nitro.          I visualized the radiology studies, my independent interpretation: Pneumonia suggested in the right base    *Discussion of ongoing management of this patient's care included:  nephrology, admitting physician  *Comorbidities contributing to the complexity of decision making:  ESRD, hypertension      A total of 45 minutes of critical care time (exclusive of billable procedures) was administered to manage the patient's unstable vital signs, respiratory instability, and cardiovascular instability due to his hypertensive emergency necessitating nitroglycerin drip.  This involved direct patient intervention, complex decision making, and/or extensive discussions with the patient, family, and clinical staff.        Admission disposition: 10/17/2024  8:45 AM           Medical Decision Making      Disposition and Plan     Clinical Impression:  1. Pneumonia of right lower lobe due to infectious organism    2. Hypertensive emergency    3. ESRD (end stage renal disease) on dialysis (HCC)    4. Hyperkalemia         Disposition:  Admit  10/17/2024  8:45 am    Follow-up:  No follow-up provider specified.        Medications Prescribed:  Current Discharge Medication List              Supplementary Documentation:         Hospital Problems       Present on Admission  Date Reviewed: 10/8/2024            ICD-10-CM Noted POA    * (Principal) Pneumonia of right lower lobe due to infectious organism J18.9 10/17/2024 Unknown                                                        Signed by Stacy Shane MD on 10/17/2024 10:38 AM         MEDICATIONS ADMINISTERED IN LAST 1 DAY:  alteplase (Activase) 2 mg in sterile water for injection (PF) 2.2 mL IV push to declot line       Date Action Dose Route User    10/18/2024 1623 Given 2 mg Intravenous Nancy, Savi Vicente RN          alteplase (Activase) 2 mg in sterile water for injection (PF) 2.2  mL IV push to declot line       Date Action Dose Route User    10/18/2024 1621 Given 2 mg Intravenous Savi Cruz RN          carvedilol (Coreg) tab 25 mg       Date Action Dose Route User    10/18/2024 0837 Given 25 mg Oral Alyssa Torres RN    10/17/2024 2125 Given 25 mg Oral Vanita Herbert RN          fenofibrate micronized (Lofibra) cap 134 mg       Date Action Dose Route User    10/17/2024 2126 Given 134 mg Oral Vanita Herbert RN          fluticasone propionate (Flonase) 50 MCG/ACT nasal suspension 1 spray       Date Action Dose Route User    10/18/2024 0845 Given 1 spray Each Nare Alyssa Torres RN          heparin (Porcine) 1000 UNIT/ML injection 1,500 Units       Date Action Dose Route User    10/17/2024 2000 Given 1,500 Units Intracatheter Vanita Herbert RN          heparin (Porcine) 5000 UNIT/ML injection 5,000 Units       Date Action Dose Route User    10/18/2024 1304 Given 5,000 Units Subcutaneous (Right Lower Abdomen) Alyssa Torres RN    10/18/2024 0544 Given 5,000 Units Subcutaneous (Left Lower Abdomen) Vanita Herbert RN    10/17/2024 2129 Given 5,000 Units Subcutaneous (Left Lower Abdomen) Vanita Herbert RN          hydrALAzine (Apresoline) 20 mg/mL injection 10 mg       Date Action Dose Route User    10/17/2024 2355 Given 10 mg Intravenous Vanita Herbert RN          hydrALAZINE (Apresoline) tab 50 mg       Date Action Dose Route User    10/17/2024 2126 Given 50 mg Oral Vanita Herbert RN          HYDROmorphone (Dilaudid) 1 MG/ML injection 0.8 mg       Date Action Dose Route User    10/18/2024 1259 Given 0.8 mg Intravenous Alyssa Torres RN    10/18/2024 0703 Given 0.8 mg Intravenous Vanita Herbert RN    10/18/2024 0229 Given 0.8 mg Intravenous ChemplavilKristina RN    10/17/2024 2128 Given 0.8 mg Intravenous Vanita Herbert RN          memantine (Namenda) tab 5 mg       Date Action Dose Route User    10/17/2024 2126 Given 5 mg Oral Vanita Herbetr RN           NIFEdipine ER (Procardia-XL) 24 hr tab 60 mg       Date Action Dose Route User    10/17/2024 2126 Given 60 mg Oral Vanita Herbert RN          piperacillin-tazobactam (Zosyn) 3.375 g in dextrose 5% 100 mL IVPB-ADDV       Date Action Dose Route User    10/18/2024 0821 New Bag 3.375 g Intravenous Alyssa Torres RN    10/17/2024 2129 New Bag 3.375 g Intravenous Vanita Herbert RN          sevelamer carbonate (Renvela) tab 800 mg       Date Action Dose Route User    10/18/2024 0836 Given 800 mg Oral Alyssa Torres RN    10/17/2024 1852 Given 800 mg Oral Emma Joel RN          sevelamer carbonate (Renvela) tab 1,600 mg       Date Action Dose Route User    10/18/2024 1500 Given 1,600 mg Oral Alyssa Torres RN          umeclidinium bromide (Incruse Ellipta) 62.5 MCG/ACT inhaler 1 puff       Date Action Dose Route User    10/18/2024 0837 Given 1 puff Inhalation Alyssa Torres RN            Vitals (last day)       Date/Time Temp Pulse Resp BP SpO2 Weight O2 Device O2 Flow Rate (L/min) [facility]    10/18/24 1256 97.7 °F (36.5 °C) 99 20 144/85 93 % -- None (Room air) -- AW    10/18/24 1057 -- 94 -- -- -- -- -- -- SZ    10/18/24 0846 97.9 °F (36.6 °C) 101 20 132/104 97 % -- None (Room air) -- AW    10/18/24 0541 -- 110 -- -- 95 % -- -- -- RS    10/18/24 0541 97.8 °F (36.6 °C) -- 28 150/97 -- 245 lb 9.5 oz (111.4 kg) None (Room air) --     10/18/24 0233 -- 98 -- 147/101 96 % -- -- -- SC    10/18/24 0228 -- 99 -- 141/98 95 % -- -- -- SC    10/17/24 2347 98 °F (36.7 °C) 90 26 173/103 98 % -- None (Room air) -- MC    10/17/24 1915 97.7 °F (36.5 °C) 96 -- 194/123 98 % -- -- -- RS    10/17/24 1137 97.6 °F (36.4 °C) 106 26 172/121 96 % -- None (Room air) --     10/17/24 1136 -- 95 24 164/112 98 % 256 lb 6.3 oz (116.3 kg) None (Room air) --     10/17/24 1133 -- 100 24 170/110 96 % -- None (Room air) --     10/17/24 1038 -- -- -- 143/98 -- -- -- -- [facility]    10/17/24 1024 -- 91 26 138/92 98 % -- None (Room air) -- CARMINE     10/17/24 0951 -- 106 29 163/112 98 % -- None (Room air) -- CARMINE    10/17/24 0916 -- 104 30 150/103 100 % -- None (Room air) -- CARMINE    10/17/24 0820 -- 108 28 181/130 96 % -- None (Room air) -- CARMINE    10/17/24 0730 -- 103 26 175/124 96 % -- None (Room air) -- CARMINE    10/17/24 0705 -- -- -- -- -- -- None (Room air) -- CARMINE    10/17/24 0703 98.1 °F (36.7 °C) 106 28 168/118 97 % 240 lb (108.9 kg) None (Room air) -- CARMINE                10/17 NEPHROLOGY CONSULT NOTE       REASON FOR CONSULT:      ESRD     HISTORY OF PRESENT ILLNESS:      45 yo M with history of ESRD on iHD MWF via RIJ CVC, LUE AVF not in use, hyperaldosteronism, DM, biopolar disorder, recurrent GI bleed presented with cough/sob. Missed HD yesterday due to feeling poorly. Nephrology consulted for further management. K 5.7 in ER - given lasix. Lokelma ordered but not given. Being treated for RLL pna.       PHYSICAL EXAM:      Vital Signs: BP (!) 172/121 (BP Location: Right arm)   Pulse 106   Temp 97.6 °F (36.4 °C) (Oral)   Resp 26   Ht 188 cm (6' 2\")   Wt 256 lb 6.3 oz (116.3 kg)   SpO2 96%   BMI 32.92 kg/m²   Temp (24hrs), Av.9 °F (36.6 °C), Min:97.6 °F (36.4 °C), Max:98.1 °F (36.7 °C)        Intake/Output Summary (Last 24 hours) at 10/17/2024 1201  Last data filed at 10/17/2024 0942      Gross per 24 hour   Intake 100 ml   Output --   Net 100 ml       ASSESSMENT/PLAN:   45 yo M with history of ESRD on iHD MWF via RIJ CVC, LUE AVF not in use, hyperaldosteronism, DM, biopolar disorder, recurrent GI bleed presented with cough/sob.     ESRD:  -- HD today to make up for yesterday, then resume MWF per schedule  -- 3-4 L UF  -- SW consult as patient interested in changing HD units     JAMESE AVF:  -- L arm precautions     Anemia in ESRD:  -- defer OWEN given high BP     MBD:  -- resume sevelamer to 1600 TID AC     Hyperkalemia:  -- 2K bath; ok to defer lokelma since doing HD     HTN:  -- s/p clonidine, lasix, hydralazine, labetalol, nitroglycerine gtt  --  UF with HD, then pending BP trend can restart home meds: coreg 25 mg BID, imdur 30 mg/d, losartan 100 mg/d, nifedipine XL 60 mg/d  -- clonidine BID not daily if restarting     Pneumonia:  -- started ceftraixone     H&P       History of Present Illness: Patient is a 44 year old male with PMH sig for  ESRD on HD (M/W/F), bipolar, depression, DM 2, HTN, DL, CAD, COPD, HFpEF, and chronic abdominal pain who presented to the ED for evaluation of cough and SOB.  He missed HD yesterday due to feeling poorly, states he has been coughing up clear sputum.  No chest pain.  He has multiple recent admissions to , most recently 10/6-10/8 GI bleed, GI consulted, recommended capsule study and surgery for hemorrhoids.  No F/C, N/V. Denies sick contacts.       In the ED, BP elevated to 180s/130s, NTG gtt was started.  K 5.7.  CXR showed worsening moderate patchy opacity of R base c/w developing PNA and effusion on the R.  IV CTX, IV lasix, IV hydralazine, IV labetalol, SL NTG, given.       On my evaluation, pt c/o cough productive of clear sputum.        Assessment/Plan:      44 yr old male with PMH sig for  ESRD on HD (M/W/F), bipolar, depression, DM 2, HTN, DL, CAD, COPD, HFpEF, and chronic abdominal pain who presented to the ED for evaluation of cough and SOB.      # Hypertensive emergency   - s/p NGT gtt  - HD today per renal  - restart home BP meds coreg 25 mg BID, imdur 30 mg/d, losartan 100 mg/d, nifedipine XL 60 mg/d   - increase clonidine to BID when restarted   - PRN IV labetalol and hydralazine      # Suspected RLL pneumonia   - start empiric zosyn for cover for HCAP  - check expanded flu panel   - f/u Bcx's     # ESRD on HD  - cont HD per renal  - renal c/s appreciated      # Chronic diastolic HF  - volume control with HD per renal      # Hx of GI bleed  - seen by GI last admit  - f/u with GI for capsule study  - hemorrhoid surgery when possible      # Chronic abd pain  # Chronic back pain  # Opioid dependence   -  follows with pain clinic as outpt   - resume home pain medications   - wean off IV narcotics when possible      # HLD  - cont statin     # COPD  - no evidence of acute exacerbation   - resume home inhalers      # Major depression, recurrent   # Bipolar d/o  - stable  - resume home medications      DVT prophy - hep subcutaneous   Dispo:  inpt care.  POC d/w pt who agrees.      10/18 NEPHROLOGY NOTE    SUBJECTIVE:      Tolerated HD yesterday with 4L UF.  States breathing a little better but still has cough.     PHYSICAL EXAM:      Vital Signs: BP (!) 132/104 (BP Location: Right arm)   Pulse 94   Temp 97.9 °F (36.6 °C) (Oral)   Resp 20   Ht 188 cm (6' 2\")   Wt 245 lb 9.5 oz (111.4 kg)   SpO2 97%   BMI 31.53 kg/m²   Temp (24hrs), Av.9 °F (36.6 °C), Min:97.7 °F (36.5 °C), Max:98 °F (36.7 °C)        Intake/Output Summary (Last 24 hours) at 10/18/2024 1237  Last data filed at 10/18/2024 1057      Gross per 24 hour   Intake 580 ml   Output 4700 ml   Net -4120 ml           General: ill appearing  Skin: no visible rashes  HEENT: NCAT  Cardiac: Regular rate and rhythm, no murmur/gallop or rub  Lungs: R base diminished + crackles  Abdomen: Soft, NTND  Extremities: warm, well perfused, no leg edema  Neurologic/Psych: mentating well  RIJ CVC  LUE AVF + thrill and bruit but small         ASSESSMENT/PLAN:      43 yo M with history of ESRD on iHD MWF via RIJ CVC, LUE AVF not in use, hyperaldosteronism, DM, biopolar disorder, recurrent GI bleed presented with cough/sob. Being treated for pneumonia and volume overload.     ESRD:  -- HD today, then continue MWF schedule  -- UF 3L  -- pt states today he would like to stay at his current HD unit     LUE AVF:  -- L arm precautions     Anemia in ESRD:  -- defer OWEN hb 10.3     MBD:  -- resume sevelamer to 1600 TID AC     Hyperkalemia:  -- improved with HD     HTN:  -- s/p clonidine, lasix, hydralazine, labetalol, nitroglycerine gtt  -- home meds: coreg 25 mg BID, imdur 30 mg/d,  losartan 100 mg/d, nifedipine XL 60 mg BID  -- hydralazine 50 changed to TID to avoid fluctuations  -- clonidine not restartted     Pneumonia:  -- started zosyn     D/w RN. Will follow.     Addendum:  Low blood flow rates of 250-300 ml/min on HD.  Will use alteplase to declot line and then resume.  Add'l time 25 min.  D/w RN.  Nephrology will follow peripherally over the weekend as patient's next HD treatment tentatively would be Monday.     10/18 HOSPITALIST NOTE    Follow Up:  The primary encounter diagnosis was Pneumonia of right lower lobe due to infectious organism. Diagnoses of Hypertensive emergency, ESRD (end stage renal disease) on dialysis (HCC), and Hyperkalemia were also pertinent to this visit.        Subjective:  Patient seen and examined.  States his breathing is improved.   C/o severe neck pain with radicular symptoms.  No F/C, N/V.    Vital signs:  Temp:  [97.7 °F (36.5 °C)-98 °F (36.7 °C)] 97.7 °F (36.5 °C)  Pulse:  [] 99  Resp:  [20-28] 20  BP: (132-194)/() 144/85  SpO2:  [93 %-98 %] 93 %    Recent Labs   Lab 10/17/24  0744 10/18/24  0554   WBC 7.6 6.5   HGB 8.7* 10.3*   .2* 96.3   .0 197.0              Recent Labs   Lab 10/17/24  0744 10/18/24  0554   * 117*   BUN 75* 38*   CREATSERUM 10.70* 6.36*   CA 7.8* 8.9   ALB 3.4 4.5    138   K 5.7* 4.1    101   CO2 20.0* 24.0   ALKPHO 70  --    AST 33  --    ALT 10*  --    BILT 0.4  --    TP 7.0  --        Assessment & Plan:  44 yr old male with PMH sig for  ESRD on HD (M/W/F), bipolar, depression, DM 2, HTN, DL, CAD, COPD, HFpEF, and chronic abdominal pain who presented to the ED for evaluation of cough and SOB.      # Hypertensive emergency   - s/p NGT gtt  - HD today per renal  - restart home BP meds coreg 25 mg BID, imdur 30 mg/d, losartan 100 mg/d, nifedipine XL 60 mg/d.  Hydralazine increased to TID.  - clonidine not restarted   - PRN IV labetalol and hydralazine      # Suspected RLL pneumonia   - cont  empiric zosyn for cover for HCAP  - expanded flu panel neg   - f/u Bcx's     # ESRD on HD  - cont HD per renal  - renal c/s appreciated      # Chronic diastolic HF  - volume control with HD per renal      # Hx of GI bleed  - seen by GI last admit  - f/u with GI for capsule study  - hemorrhoid surgery when possible      # Chronic abd pain  # Chronic back pain  # Opioid dependence   - follows with pain clinic as outpt   - change norco to PRN oxy IR.  Add flexeril PRN  - wean off IV narcotics when possible   - pt to f/u with spine surgeon      # HLD  - cont statin     # COPD  - no evidence of acute exacerbation   - resume home inhalers      # Major depression, recurrent   # Bipolar d/o  - stable  - resume home medications      Plan of care: inpt care.  POC d/w pt who agrees.

## 2024-10-19 ENCOUNTER — APPOINTMENT (OUTPATIENT)
Dept: MRI IMAGING | Facility: HOSPITAL | Age: 44
End: 2024-10-19
Attending: INTERNAL MEDICINE
Payer: MEDICARE

## 2024-10-19 LAB
ALBUMIN SERPL-MCNC: 4.5 G/DL (ref 3.2–4.8)
ANION GAP SERPL CALC-SCNC: 9 MMOL/L (ref 0–18)
BUN BLD-MCNC: 34 MG/DL (ref 9–23)
CALCIUM BLD-MCNC: 9.1 MG/DL (ref 8.7–10.4)
CHLORIDE SERPL-SCNC: 104 MMOL/L (ref 98–112)
CO2 SERPL-SCNC: 24 MMOL/L (ref 21–32)
CREAT BLD-MCNC: 5.63 MG/DL
EGFRCR SERPLBLD CKD-EPI 2021: 12 ML/MIN/1.73M2 (ref 60–?)
GLUCOSE BLD-MCNC: 105 MG/DL (ref 70–99)
GLUCOSE BLD-MCNC: 162 MG/DL (ref 70–99)
OSMOLALITY SERPL CALC.SUM OF ELEC: 292 MOSM/KG (ref 275–295)
PHOSPHATE SERPL-MCNC: 7.2 MG/DL (ref 2.4–5.1)
POTASSIUM SERPL-SCNC: 4.9 MMOL/L (ref 3.5–5.1)
SODIUM SERPL-SCNC: 137 MMOL/L (ref 136–145)

## 2024-10-19 PROCEDURE — 72141 MRI NECK SPINE W/O DYE: CPT | Performed by: INTERNAL MEDICINE

## 2024-10-19 PROCEDURE — 82962 GLUCOSE BLOOD TEST: CPT

## 2024-10-19 PROCEDURE — 80069 RENAL FUNCTION PANEL: CPT | Performed by: INTERNAL MEDICINE

## 2024-10-19 RX ORDER — LISDEXAMFETAMINE DIMESYLATE 60 MG/1
60 CAPSULE ORAL DAILY
Status: DISCONTINUED | OUTPATIENT
Start: 2024-10-19 | End: 2024-10-22

## 2024-10-19 RX ORDER — LISDEXAMFETAMINE DIMESYLATE 60 MG/1
60 CAPSULE ORAL DAILY
Status: DISCONTINUED | OUTPATIENT
Start: 2024-10-19 | End: 2024-10-19

## 2024-10-19 NOTE — PLAN OF CARE
Pt alert and oriented x4.  Up standby .  On room air.  NSR on tele.  Continent of bowel and low urine output (on HD)  Pt  complaints generalized pain, prn pain med given with relieved.  Plan of care discussed with pt.  Falls precautions in place.  Call light within reach.    @ 2310 HD completed , 3l out    Problem: CARDIOVASCULAR - ADULT  Goal: Maintains optimal cardiac output and hemodynamic stability  Description: INTERVENTIONS:  - Monitor vital signs, rhythm, and trends  - Monitor for bleeding, hypotension and signs of decreased cardiac output  - Evaluate effectiveness of vasoactive medications to optimize hemodynamic stability  - Monitor arterial and/or venous puncture sites for bleeding and/or hematoma  - Assess quality of pulses, skin color and temperature  - Assess for signs of decreased coronary artery perfusion - ex. Angina  - Evaluate fluid balance, assess for edema, trend weights  10/18/2024 2355 by Annie Lr RN  Outcome: Progressing  10/18/2024 2354 by Annie Lr, RN  Outcome: Progressing  Goal: Absence of cardiac arrhythmias or at baseline  Description: INTERVENTIONS:  - Continuous cardiac monitoring, monitor vital signs, obtain 12 lead EKG if indicated  - Evaluate effectiveness of antiarrhythmic and heart rate control medications as ordered  - Initiate emergency measures for life threatening arrhythmias  - Monitor electrolytes and administer replacement therapy as ordered  10/18/2024 2355 by Annie Lr RN  Outcome: Progressing  10/18/2024 2354 by Annie Lr, RN  Outcome: Progressing     Problem: PAIN - ADULT  Goal: Verbalizes/displays adequate comfort level or patient's stated pain goal  Description: INTERVENTIONS:  - Encourage pt to monitor pain and request assistance  - Assess pain using appropriate pain scale  - Administer analgesics based on type and severity of pain and evaluate response  - Implement non-pharmacological measures as appropriate and evaluate response  - Consider  cultural and social influences on pain and pain management  - Manage/alleviate anxiety  - Utilize distraction and/or relaxation techniques  - Monitor for opioid side effects  - Notify MD/LIP if interventions unsuccessful or patient reports new pain  - Anticipate increased pain with activity and pre-medicate as appropriate  10/18/2024 2355 by Annie Lr RN  Outcome: Progressing  10/18/2024 2354 by Annie Lr RN  Outcome: Progressing     Problem: HEMATOLOGIC - ADULT  Goal: Free from bleeding injury  Description: (Example usage: patient with low platelets)  INTERVENTIONS:  - Avoid intramuscular injections, enemas and rectal medication administration  - Ensure safe mobilization of patient  - Hold pressure on venipuncture sites to achieve adequate hemostasis  - Assess for signs and symptoms of internal bleeding  - Monitor lab trends  - Patient is to report abnormal signs of bleeding to staff  - Avoid use of toothpicks and dental floss  - Use electric shaver for shaving  - Use soft bristle tooth brush  - Limit straining and forceful nose blowing  10/18/2024 2355 by Annie Lr RN  Outcome: Progressing  10/18/2024 2354 by Annie Lr RN  Outcome: Progressing

## 2024-10-19 NOTE — PLAN OF CARE
Assumed care of pt at 0730. A/ox4, rm air, SR on tele. Reported pain at beginning of shift in neck and shoulders -- PRN IV pain medication given w/ relief. Breath sounds clear/diminished upon auscultation. Denies cough. Reports decreased urine output due to dialysis, but does still urinate. Impaired vision - wears glasses.    Discussed POC w/ pt.    Problem: CARDIOVASCULAR - ADULT  Goal: Maintains optimal cardiac output and hemodynamic stability  Description: INTERVENTIONS:  - Monitor vital signs, rhythm, and trends  - Monitor for bleeding, hypotension and signs of decreased cardiac output  - Evaluate effectiveness of vasoactive medications to optimize hemodynamic stability  - Monitor arterial and/or venous puncture sites for bleeding and/or hematoma  - Assess quality of pulses, skin color and temperature  - Assess for signs of decreased coronary artery perfusion - ex. Angina  - Evaluate fluid balance, assess for edema, trend weights  Outcome: Progressing  Goal: Absence of cardiac arrhythmias or at baseline  Description: INTERVENTIONS:  - Continuous cardiac monitoring, monitor vital signs, obtain 12 lead EKG if indicated  - Evaluate effectiveness of antiarrhythmic and heart rate control medications as ordered  - Initiate emergency measures for life threatening arrhythmias  - Monitor electrolytes and administer replacement therapy as ordered  Outcome: Progressing     Problem: PAIN - ADULT  Goal: Verbalizes/displays adequate comfort level or patient's stated pain goal  Description: INTERVENTIONS:  - Encourage pt to monitor pain and request assistance  - Assess pain using appropriate pain scale  - Administer analgesics based on type and severity of pain and evaluate response  - Implement non-pharmacological measures as appropriate and evaluate response  - Consider cultural and social influences on pain and pain management  - Manage/alleviate anxiety  - Utilize distraction and/or relaxation techniques  - Monitor for  opioid side effects  - Notify MD/LIP if interventions unsuccessful or patient reports new pain  - Anticipate increased pain with activity and pre-medicate as appropriate  Outcome: Progressing     Problem: Patient/Family Goals  Goal: Patient/Family Long Term Goal  Description: Patient's Long Term Goal: discharge home    Interventions:  - Hospitalist/Renal, labs, STAT dialysis, scheduled bp meds, prn pain meds, IV abx, daily weight, expanded flu panel, nitroglycerin gtt  - See additional Care Plan goals for specific interventions  Outcome: Progressing  Goal: Patient/Family Short Term Goal  Description: Patient's Short Term Goal: control blood pressure    Interventions:   - scheduled bp meds, prn pain meds, STAT dialysis  - See additional Care Plan goals for specific interventions  Outcome: Progressing     Problem: HEMATOLOGIC - ADULT  Goal: Free from bleeding injury  Description: (Example usage: patient with low platelets)  INTERVENTIONS:  - Avoid intramuscular injections, enemas and rectal medication administration  - Ensure safe mobilization of patient  - Hold pressure on venipuncture sites to achieve adequate hemostasis  - Assess for signs and symptoms of internal bleeding  - Monitor lab trends  - Patient is to report abnormal signs of bleeding to staff  - Avoid use of toothpicks and dental floss  - Use electric shaver for shaving  - Use soft bristle tooth brush  - Limit straining and forceful nose blowing  Outcome: Progressing

## 2024-10-19 NOTE — PROGRESS NOTES
[facility]   [facility]    Nephrology Progress Note    Godwin Fonseca Attending:  Phong Freedman DO       SUBJECTIVE:     No complaints    PHYSICAL EXAM:     Vital Signs: /53 (BP Location: Right arm)   Pulse 84   Temp 98.1 °F (36.7 °C) (Oral)   Resp 20   Ht 6' 2\" (1.88 m)   Wt 239 lb 13.8 oz (108.8 kg)   SpO2 (!) 89%   BMI 30.80 kg/m²   Temp (24hrs), Av.2 °F (36.8 °C), Min:97.6 °F (36.4 °C), Max:98.9 °F (37.2 °C)       Intake/Output Summary (Last 24 hours) at 10/19/2024 1442  Last data filed at 10/19/2024 1000  Gross per 24 hour   Intake 510 ml   Output 3750 ml   Net -3240 ml     Wt Readings from Last 3 Encounters:   10/19/24 239 lb 13.8 oz (108.8 kg)   10/07/24 240 lb (108.9 kg)   24 245 lb (111.1 kg)          General: ill appearing  Skin: no visible rashes  HEENT: NCAT  Cardiac: Regular rate and rhythm, no murmur/gallop or rub  Lungs: R base diminished + crackles  Abdomen: Soft, NTND  Extremities: warm, well perfused, no leg edema  Neurologic/Psych: mentating well  RIJ CVC  LUE AVF + thrill and bruit but small     LABORATORY DATA:     Lab Results   Component Value Date     (H) 10/19/2024    BUN 34 (H) 10/19/2024    BUNCREA 13.0 2022    CREATSERUM 5.63 (H) 10/19/2024    ANIONGAP 9 10/19/2024    GFR 59 (L) 2018    GFRNAA 30 (L) 2022    GFRAA 35 (L) 2022    CA 9.1 10/19/2024    OSMOCALC 292 10/19/2024    ALKPHO 70 10/17/2024    AST 33 10/17/2024    ALT 10 (L) 10/17/2024    BILT 0.4 10/17/2024    TP 7.0 10/17/2024    ALB 4.5 10/19/2024    GLOBULIN 3.6 10/17/2024     10/19/2024    K 4.9 10/19/2024     10/19/2024    CO2 24.0 10/19/2024     Lab Results   Component Value Date    WBC 6.5 10/18/2024    RBC 3.21 (L) 10/18/2024    HGB 10.3 (L) 10/18/2024    HCT 30.9 (L) 10/18/2024    .0 10/18/2024    MPV 11.5 2012    MCV 96.3 10/18/2024    MCH 32.1 10/18/2024    MCHC 33.3 10/18/2024    RDW 15.2 10/18/2024    NEPRELIM 4.09  10/18/2024    NEPERCENT 63.0 10/18/2024    LYPERCENT 17.8 10/18/2024    MOPERCENT 11.7 10/18/2024    EOPERCENT 6.0 10/18/2024    BAPERCENT 1.2 10/18/2024    NE 4.09 10/18/2024    LYMABS 1.16 10/18/2024    MOABSO 0.76 10/18/2024    EOABSO 0.39 10/18/2024    BAABSO 0.08 10/18/2024     Lab Results   Component Value Date    MALBP 167.00 05/24/2023    CREUR 142.00 05/24/2023    CREUR 142.00 05/24/2023     Lab Results   Component Value Date    COLORUR Colorless (A) 09/16/2024    CLARITY Clear 09/16/2024    SPECGRAVITY 1.012 09/16/2024    GLUUR Normal 09/16/2024    BILUR Negative 09/16/2024    KETUR Negative 09/16/2024    BLOODURINE 3+ (A) 09/16/2024    PHURINE 6.0 09/16/2024    PROUR 50 (A) 09/16/2024    UROBILINOGEN Normal 09/16/2024    NITRITE Negative 09/16/2024    LEUUR Negative 09/16/2024    WBCUR 1-5 09/16/2024    RBCUR 0-2 09/16/2024    EPIUR Few (A) 09/16/2024    BACUR None Seen 09/16/2024    CAOXUR Occasional (A) 04/20/2018    HYLUR Present (A) 05/14/2019         IMAGING:     Reviewed.    MEDICATIONS:       Current Facility-Administered Medications   Medication Dose Route Frequency    Lisdexamfetamine Dimesylate (VYVANSE) cap 60 mg - patient supplied  60 mg Oral Daily    hydrALAZINE (Apresoline) tab 50 mg  50 mg Oral Q8H MARTHA    sevelamer carbonate (Renvela) tab 1,600 mg  1,600 mg Oral TID CC    sodium chloride 0.9 % IV bolus 100 mL  100 mL Intravenous Q30 Min PRN    And    albumin human (Albumin) 25% injection 25 g  25 g Intravenous PRN Dialysis    oxyCODONE immediate release tab 10 mg  10 mg Oral Q4H PRN    cyclobenzaprine (Flexeril) tab 10 mg  10 mg Oral TID PRN    HYDROmorphone (Dilaudid) 1 MG/ML injection 0.2 mg  0.2 mg Intravenous Q4H PRN    Or    HYDROmorphone (Dilaudid) 1 MG/ML injection 0.4 mg  0.4 mg Intravenous Q4H PRN    Or    HYDROmorphone (Dilaudid) 1 MG/ML injection 0.8 mg  0.8 mg Intravenous Q4H PRN    nitroGLYCERIN in dextrose 5% 50 mg/250mL infusion premix  5-400 mcg/min Intravenous Titrated     hydrALAzine (Apresoline) 20 mg/mL injection 10 mg  10 mg Intravenous Q6H PRN    labetalol (Trandate) 5 mg/mL injection 10 mg  10 mg Intravenous Q6H PRN    ondansetron (Zofran) 4 MG/2ML injection 4 mg  4 mg Intravenous Q6H PRN    acetaminophen (Tylenol) tab 650 mg  650 mg Oral Q6H PRN    albuterol (Ventolin HFA) 108 (90 Base) MCG/ACT inhaler 2 puff  2 puff Inhalation Q6H PRN    ARIPiprazole (Abilify) tab 15 mg  15 mg Oral Daily    buPROPion ER (Wellbutrin XL) 24 hr tab 150 mg  150 mg Oral Daily    fenofibrate micronized (Lofibra) cap 134 mg  134 mg Oral Nightly    FLUoxetine (PROzac) cap 40 mg  40 mg Oral Daily    fluticasone propionate (Flonase) 50 MCG/ACT nasal suspension 1 spray  1 spray Each Nare Daily    isosorbide mononitrate ER (Imdur) 24 hr tab 30 mg  30 mg Oral Daily    lamoTRIgine (LaMICtal) tab 150 mg  150 mg Oral Daily    losartan (Cozaar) tab 100 mg  100 mg Oral Daily    memantine (Namenda) tab 5 mg  5 mg Oral BID    NIFEdipine ER (Procardia-XL) 24 hr tab 60 mg  60 mg Oral BID    tamsulosin (Flomax) cap 0.4 mg  0.4 mg Oral Daily    umeclidinium bromide (Incruse Ellipta) 62.5 MCG/ACT inhaler 1 puff  1 puff Inhalation Daily    piperacillin-tazobactam (Zosyn) 3.375 g in dextrose 5% 100 mL IVPB-ADDV  3.375 g Intravenous Q12H    heparin (Porcine) 5000 UNIT/ML injection 5,000 Units  5,000 Units Subcutaneous Q8H MARTHA    carvedilol (Coreg) tab 25 mg  25 mg Oral BID       ASSESSMENT/PLAN:     45 yo M with history of ESRD on iHD MWF via RIJ CVC, LUE AVF not in use, hyperaldosteronism, DM, biopolar disorder, recurrent GI bleed presented with cough/sob. Being treated for pneumonia and volume overload.     ESRD:  -- HD yesterday, then continue MWF schedule   -- pt states today he would like to stay at his current HD unit     LUE AVF:  -- L arm precautions     Anemia in ESRD:  -- defer OWEN hb 10.3     MBD:  -- resume sevelamer to 1600 TID AC     Hyperkalemia:  -- improved with HD     HTN:  -- s/p clonidine, lasix,  hydralazine, labetalol, nitroglycerine gtt  -- home meds: coreg 25 mg BID, imdur 30 mg/d, losartan 100 mg/d, nifedipine XL 60 mg BID  -- hydralazine 50 changed to TID to avoid fluctuations  -- clonidine not restartted     Pneumonia:  -- started zosyn    D/w RN. Will follow.      Samaria Nava MD  [facility]

## 2024-10-19 NOTE — PROGRESS NOTES
[facility]   part of [facility] Hospitalist Progress Note     Godwin Fonseca Patient Status:  Inpatient    1980 MRN [id number]   Location [facility] 2NE-A Attending Phong Freedman, DO   Hosp Day # 2 PCP Adrian Small MD     Follow Up:  The primary encounter diagnosis was Pneumonia of right lower lobe due to infectious organism. Diagnoses of Hypertensive emergency, ESRD (end stage renal disease) on dialysis (HCC), and Hyperkalemia were also pertinent to this visit.    Subjective:     Patient seen and examined.  States his neck pain and radicular symptoms continue to be severe, little improvement with oral oxy IR.  Feels his arms are weaker as well.  No F/C, N/V.    Objective:    Review of Systems:   10 point ROS completed and was negative, except for pertinent positive and negatives stated in subjective.    Vital signs:  Temp:  [97.6 °F (36.4 °C)-98.9 °F (37.2 °C)] 98.3 °F (36.8 °C)  Pulse:  [] 92  Resp:  [20-21] 20  BP: (130-149)/() 135/69  SpO2:  [87 %-96 %] 96 %    Physical Exam:    Gen: No acute distress, alert and oriented x3, no focal neurologic deficits.  Chronically ill appearing male.    HEENT:  EOMI, PERRLA, OP clear, MMM  Pulm:  + improving R basilar crackles, normal respiratory effort  CV: Heart with regular rate and rhythm, no murmur.  Normal PMI.    Abd: Abdomen soft, nontender, nondistended, no organomegaly, bowel sounds present  MSK: Full range of motion in extremities, no clubbing, no cyanosis  Skin: no rashes or lesions  Neuro:  Grossly intact, no focal deficits  Lines: RIJ CVC  LUE AVF         Diagnostic Data:    Labs:  Recent Labs   Lab 10/17/24  0744 10/18/24  0554   WBC 7.6 6.5   HGB 8.7* 10.3*   .2* 96.3   .0 197.0       Recent Labs   Lab 10/17/24  0744 10/18/24  0554 10/19/24  0717   * 117* 105*   BUN 75* 38* 34*   CREATSERUM 10.70* 6.36* 5.63*   CA 7.8* 8.9 9.1   ALB 3.4 4.5 4.5    138 137   K 5.7* 4.1  4.9    101 104   CO2 20.0* 24.0 24.0   ALKPHO 70  --   --    AST 33  --   --    ALT 10*  --   --    BILT 0.4  --   --    TP 7.0  --   --        Estimated Creatinine Clearance: 19.1 mL/min (A) (based on SCr of 5.63 mg/dL (H)).    No results for input(s): \"PTP\", \"INR\" in the last 168 hours.         COVID-19 Lab Results    COVID-19  Lab Results   Component Value Date    COVID19 Not Detected 10/17/2024    COVID19 Not Detected 09/28/2024    COVID19 Not Detected 09/15/2024       Pro-Calcitonin  No results for input(s): \"PCT\" in the last 168 hours.    Cardiac  No results for input(s): \"TROP\", \"PBNP\" in the last 168 hours.    Creatinine Kinase  No results for input(s): \"CK\" in the last 168 hours.    Inflammatory Markers  No results for input(s): \"CRP\", \"HARJEET\", \"LDH\", \"DDIMER\" in the last 168 hours.    Imaging: Imaging data reviewed in Epic.    Medications:    hydrALAZINE  50 mg Oral Q8H MARTHA    sevelamer carbonate  1,600 mg Oral TID CC    ARIPiprazole  15 mg Oral Daily    buPROPion ER  150 mg Oral Daily    fenofibrate micronized  134 mg Oral Nightly    FLUoxetine HCl  40 mg Oral Daily    fluticasone propionate  1 spray Each Nare Daily    isosorbide mononitrate ER  30 mg Oral Daily    lamoTRIgine  150 mg Oral Daily    losartan  100 mg Oral Daily    memantine  5 mg Oral BID    NIFEdipine ER  60 mg Oral BID    tamsulosin  0.4 mg Oral Daily    umeclidinium bromide  1 puff Inhalation Daily    piperacillin-tazobactam  3.375 g Intravenous Q12H    heparin  5,000 Units Subcutaneous Q8H MARTHA    carvedilol  25 mg Oral BID    [Held by provider] Lisdexamfetamine Dimesylate  60 mg Oral Daily       Assessment & Plan:      44 yr old male with PMH sig for  ESRD on HD (M/W/F), bipolar, depression, DM 2, HTN, DL, CAD, COPD, HFpEF, and chronic abdominal pain who presented to the ED for evaluation of cough and SOB.      # Hypertensive emergency   - s/p NGT gtt  - HD today per renal  - restart home BP meds coreg 25 mg BID, imdur 30 mg/d,  losartan 100 mg/d, nifedipine XL 60 mg/d.  Hydralazine increased to TID.  - clonidine not restarted   - PRN IV labetalol and hydralazine      # Suspected RLL pneumonia   - cont empiric zosyn for cover for HCAP  - expanded flu panel neg   - f/u Bcx's     # Chronic neck pain with cervical radiculopathy   - pt has failed steroid injections, was to see spine surgeon at [facility] but needed cardiac clearance.    - no with more severe symptoms, pain uncontrolled on oral medications, does not feel he can manage pain at home  - repeat MRI c-spine w/o contrast ordered (need to compare with 2/29/2024 study).  Pending results consider second opinion with spine surgery here.     # ESRD on HD  - cont HD per renal  - renal c/s appreciated      # Chronic diastolic HF  - volume control with HD per renal      # Hx of GI bleed  - seen by GI last admit  - f/u with GI for capsule study  - hemorrhoid surgery when possible      # Chronic abd pain  # Chronic back pain  # Opioid dependence   - follows with pain clinic as outpt   - changed norco to PRN oxy IR.  Added flexeril PRN  - wean off IV narcotics when possible   - repeat MRI c-spine ordered  - pt to f/u with spine surgeon      # HLD  - cont statin     # COPD  - no evidence of acute exacerbation   - resume home inhalers      # Major depression, recurrent   # Bipolar d/o  - stable  - resume home medications     Plan of care: inpt care.  POC d/w pt who agrees.     Plan of care discussed with patient or family at bedside.    Phong Freedman, DO    Supplementary Documentation:     Quality:  DVT Prophylaxis: hep subcutaneous   CODE status: FULL  Abbott: no  Central line: yes  If COVID testing is negative, may discontinue isolation: yes     Estimated date of discharge: 1-2 days  Discharge is dependent on: clinical course   At this point Mr. Fonseca is expected to be discharge to: home    Plan of care discussed with pt

## 2024-10-19 NOTE — SPIRITUAL CARE NOTE
Spiritual Care Visit Note    Patient Name: Godwin Fonseca Date of Spiritual Care Visit: 10/18/24   : 1980 Primary Dx: Pneumonia of right lower lobe due to infectious organism       Referred By: Referral From: Other (Comment) (Request POA)    Spiritual Care Taxonomy:    Intended Effects: Build relationship of care and support    Methods: Offer support    Interventions: Acknowledge current situation;Active listening;Silent prayer    Visit Type/Summary:     - PoA: Patient unable to complete PoAH at this time: Patient appeared to be sleeping.  remains available for follow up.    Spiritual Care support can be requested via an [facility] consult. For urgent/immediate needs, please contact the On Call  at: [place]: ext [phone]    Rev Ronda Singh MDiv

## 2024-10-20 LAB
ALBUMIN SERPL-MCNC: 4.1 G/DL (ref 3.2–4.8)
ANION GAP SERPL CALC-SCNC: 11 MMOL/L (ref 0–18)
BUN BLD-MCNC: 52 MG/DL (ref 9–23)
CALCIUM BLD-MCNC: 9 MG/DL (ref 8.7–10.4)
CHLORIDE SERPL-SCNC: 103 MMOL/L (ref 98–112)
CO2 SERPL-SCNC: 23 MMOL/L (ref 21–32)
CREAT BLD-MCNC: 7.74 MG/DL
EGFRCR SERPLBLD CKD-EPI 2021: 8 ML/MIN/1.73M2 (ref 60–?)
GLUCOSE BLD-MCNC: 133 MG/DL (ref 70–99)
OSMOLALITY SERPL CALC.SUM OF ELEC: 300 MOSM/KG (ref 275–295)
PHOSPHATE SERPL-MCNC: 8 MG/DL (ref 2.4–5.1)
POTASSIUM SERPL-SCNC: 4.8 MMOL/L (ref 3.5–5.1)
SODIUM SERPL-SCNC: 137 MMOL/L (ref 136–145)

## 2024-10-20 PROCEDURE — 80069 RENAL FUNCTION PANEL: CPT | Performed by: INTERNAL MEDICINE

## 2024-10-20 RX ORDER — HEPARIN SODIUM 1000 [USP'U]/ML
1.5 INJECTION, SOLUTION INTRAVENOUS; SUBCUTANEOUS
Status: COMPLETED | OUTPATIENT
Start: 2024-10-21 | End: 2024-10-22

## 2024-10-20 RX ORDER — ALBUMIN (HUMAN) 12.5 G/50ML
25 SOLUTION INTRAVENOUS
Status: DISCONTINUED | OUTPATIENT
Start: 2024-10-20 | End: 2024-10-22

## 2024-10-20 NOTE — PLAN OF CARE
Assumed care of patient at 1930.   Pt is Aox4 , resting comfortably in bed.   Patient is on room air.  Complains of no difficulty breathing.  Lung sounds diminished.   NSR on tele.   Pt is on a renal diet.   Pt is continent B&B, low urine output HD pt.   Complains of pain to the neck, shoulder, and back.  Pain medication given per order.   Pt is ambulating standby assist.   Skin in unremarkable.   Bed at lowest position, room cleared of clutter, bed alarm is on, and call light is within reach.   POC discussed, fall precautions reviewed, and questions answered.      Problem: CARDIOVASCULAR - ADULT  Goal: Maintains optimal cardiac output and hemodynamic stability  Description: INTERVENTIONS:  - Monitor vital signs, rhythm, and trends  - Monitor for bleeding, hypotension and signs of decreased cardiac output  - Evaluate effectiveness of vasoactive medications to optimize hemodynamic stability  - Monitor arterial and/or venous puncture sites for bleeding and/or hematoma  - Assess quality of pulses, skin color and temperature  - Assess for signs of decreased coronary artery perfusion - ex. Angina  - Evaluate fluid balance, assess for edema, trend weights  Outcome: Progressing  Goal: Absence of cardiac arrhythmias or at baseline  Description: INTERVENTIONS:  - Continuous cardiac monitoring, monitor vital signs, obtain 12 lead EKG if indicated  - Evaluate effectiveness of antiarrhythmic and heart rate control medications as ordered  - Initiate emergency measures for life threatening arrhythmias  - Monitor electrolytes and administer replacement therapy as ordered  Outcome: Progressing     Problem: PAIN - ADULT  Goal: Verbalizes/displays adequate comfort level or patient's stated pain goal  Description: INTERVENTIONS:  - Encourage pt to monitor pain and request assistance  - Assess pain using appropriate pain scale  - Administer analgesics based on type and severity of pain and evaluate response  - Implement  non-pharmacological measures as appropriate and evaluate response  - Consider cultural and social influences on pain and pain management  - Manage/alleviate anxiety  - Utilize distraction and/or relaxation techniques  - Monitor for opioid side effects  - Notify MD/LIP if interventions unsuccessful or patient reports new pain  - Anticipate increased pain with activity and pre-medicate as appropriate  Outcome: Progressing     Problem: Patient/Family Goals  Goal: Patient/Family Long Term Goal  Description: Patient's Long Term Goal: discharge home    Interventions:  - Hospitalist/Renal, labs, STAT dialysis, scheduled bp meds, prn pain meds, IV abx, daily weight, expanded flu panel, nitroglycerin gtt  - See additional Care Plan goals for specific interventions  Outcome: Progressing  Goal: Patient/Family Short Term Goal  Description: Patient's Short Term Goal: control blood pressure    Interventions:   - scheduled bp meds, prn pain meds, STAT dialysis  - See additional Care Plan goals for specific interventions  Outcome: Progressing     Problem: HEMATOLOGIC - ADULT  Goal: Free from bleeding injury  Description: (Example usage: patient with low platelets)  INTERVENTIONS:  - Avoid intramuscular injections, enemas and rectal medication administration  - Ensure safe mobilization of patient  - Hold pressure on venipuncture sites to achieve adequate hemostasis  - Assess for signs and symptoms of internal bleeding  - Monitor lab trends  - Patient is to report abnormal signs of bleeding to staff  - Avoid use of toothpicks and dental floss  - Use electric shaver for shaving  - Use soft bristle tooth brush  - Limit straining and forceful nose blowing  Outcome: Progressing

## 2024-10-20 NOTE — PROGRESS NOTES
[facility]   part of [facility] Hospitalist Progress Note     Godwin Fonseca Patient Status:  Inpatient    1980 MRN [id number]   Location [facility] 2NE-A Attending Phong Freedman, DO   Hosp Day # 3 PCP Adrian Small MD     Follow Up:  The primary encounter diagnosis was Pneumonia of right lower lobe due to infectious organism. Diagnoses of Hypertensive emergency, ESRD (end stage renal disease) on dialysis (HCC), and Hyperkalemia were also pertinent to this visit.    Subjective:     Patient seen and examined.  Still c/o neck pain although states oxy IR po is more effective this AM.  No F/C, N/V.    Objective:    Review of Systems:   10 point ROS completed and was negative, except for pertinent positive and negatives stated in subjective.    Vital signs:  Temp:  [97 °F (36.1 °C)-98.1 °F (36.7 °C)] 97.5 °F (36.4 °C)  Pulse:  [84-93] 87  Resp:  [19-21] 19  BP: ()/(53-87) 114/68  SpO2:  [89 %-97 %] 97 %    Physical Exam:    Gen: No acute distress, alert and oriented x3, no focal neurologic deficits.  Chronically ill appearing male.    HEENT:  EOMI, PERRLA, OP clear, MMM  Pulm:  + improving R basilar crackles, normal respiratory effort  CV: Heart with regular rate and rhythm, no murmur.  Normal PMI.    Abd: Abdomen soft, nontender, nondistended, no organomegaly, bowel sounds present  MSK: Full range of motion in extremities, no clubbing, no cyanosis  Skin: no rashes or lesions  Neuro:  Grossly intact, no focal deficits  Lines: RIJ CVC  LUE AVF         Diagnostic Data:    Labs:  Recent Labs   Lab 10/17/24  0744 10/18/24  0554   WBC 7.6 6.5   HGB 8.7* 10.3*   .2* 96.3   .0 197.0       Recent Labs   Lab 10/17/24  0744 10/18/24  0554 10/19/24  0717 10/20/24  0611   * 117* 105* 133*   BUN 75* 38* 34* 52*   CREATSERUM 10.70* 6.36* 5.63* 7.74*   CA 7.8* 8.9 9.1 9.0   ALB 3.4 4.5 4.5 4.1    138 137 137   K 5.7* 4.1 4.9 4.8    101 104 103    CO2 20.0* 24.0 24.0 23.0   ALKPHO 70  --   --   --    AST 33  --   --   --    ALT 10*  --   --   --    BILT 0.4  --   --   --    TP 7.0  --   --   --        Estimated Creatinine Clearance: 13.9 mL/min (A) (based on SCr of 7.74 mg/dL (H)).    No results for input(s): \"PTP\", \"INR\" in the last 168 hours.         COVID-19 Lab Results    COVID-19  Lab Results   Component Value Date    COVID19 Not Detected 10/17/2024    COVID19 Not Detected 09/28/2024    COVID19 Not Detected 09/15/2024       Pro-Calcitonin  No results for input(s): \"PCT\" in the last 168 hours.    Cardiac  No results for input(s): \"TROP\", \"PBNP\" in the last 168 hours.    Creatinine Kinase  No results for input(s): \"CK\" in the last 168 hours.    Inflammatory Markers  No results for input(s): \"CRP\", \"HARJEET\", \"LDH\", \"DDIMER\" in the last 168 hours.    Imaging: Imaging data reviewed in Epic.    Medications:    Lisdexamfetamine Dimesylate  60 mg Oral Daily    hydrALAZINE  50 mg Oral Q8H MARTHA    sevelamer carbonate  1,600 mg Oral TID CC    ARIPiprazole  15 mg Oral Daily    buPROPion ER  150 mg Oral Daily    fenofibrate micronized  134 mg Oral Nightly    FLUoxetine HCl  40 mg Oral Daily    fluticasone propionate  1 spray Each Nare Daily    isosorbide mononitrate ER  30 mg Oral Daily    lamoTRIgine  150 mg Oral Daily    losartan  100 mg Oral Daily    memantine  5 mg Oral BID    NIFEdipine ER  60 mg Oral BID    tamsulosin  0.4 mg Oral Daily    umeclidinium bromide  1 puff Inhalation Daily    piperacillin-tazobactam  3.375 g Intravenous Q12H    heparin  5,000 Units Subcutaneous Q8H MARTHA    carvedilol  25 mg Oral BID       Assessment & Plan:      44 yr old male with PMH sig for  ESRD on HD (M/W/F), bipolar, depression, DM 2, HTN, DL, CAD, COPD, HFpEF, and chronic abdominal pain who presented to the ED for evaluation of cough and SOB.      # Hypertensive emergency   - s/p NGT gtt  - HD today per renal  - restart home BP meds coreg 25 mg BID, imdur 30 mg/d, losartan 100  mg/d, nifedipine XL 60 mg/d.  Hydralazine increased to TID.  - clonidine not restarted   - PRN IV labetalol and hydralazine      # Suspected RLL pneumonia   - cont empiric zosyn for cover for HCAP  - expanded flu panel neg   - f/u Bcx's (NGTD)     # Chronic neck pain with cervical radiculopathy   - pt has failed steroid injections, was to see spine surgeon at [facility] but needed cardiac clearance.    - repeat MRI c-spine showed mild DJD.  Results discussed with pt, recommend outpt f/u with spine surgeon, no need for inpatient spine consult.    # ESRD on HD  - cont HD per renal  - renal c/s appreciated      # Chronic diastolic HF  - volume control with HD per renal      # Hx of GI bleed  - seen by GI last admit  - f/u with GI for capsule study  - hemorrhoid surgery when possible      # Chronic abd pain  # Chronic back pain  # Opioid dependence   - follows with pain clinic as outpt   - changed norco to PRN oxy IR.  Added flexeril PRN  - wean off IV narcotics when possible   - repeat MRI c-spine showed mild DJD   - pt to f/u with spine surgeon      # HLD  - cont statin     # COPD  - no evidence of acute exacerbation   - resume home inhalers      # Major depression, recurrent   # Bipolar d/o  - stable  - resume home medications     Plan of care: inpt care.  POC d/w pt who agrees.     Plan of care discussed with patient or family at bedside.    Phong Freedman, DO    Supplementary Documentation:     Quality:  DVT Prophylaxis: hep subcutaneous   CODE status: FULL  Abbott: no  Central line: yes  If COVID testing is negative, may discontinue isolation: yes     Estimated date of discharge: tomorrow after HD  Discharge is dependent on: clinical course   At this point Mr. Fonseca is expected to be discharge to: home    Plan of care discussed with pt

## 2024-10-20 NOTE — PROGRESS NOTES
[facility]   part of [facility]    Nephrology Progress Note    Godwin Fonseca Attending:  Phong Freedman DO       SUBJECTIVE:     No complaints    PHYSICAL EXAM:     Vital Signs: /80 (BP Location: Right arm)   Pulse 84   Temp 97.4 °F (36.3 °C) (Oral)   Resp 18   Ht 6' 2\" (1.88 m)   Wt 246 lb 7.6 oz (111.8 kg)   SpO2 99%   BMI 31.65 kg/m²   Temp (24hrs), Av.5 °F (36.4 °C), Min:97 °F (36.1 °C), Max:98.1 °F (36.7 °C)       Intake/Output Summary (Last 24 hours) at 10/20/2024 1307  Last data filed at 10/20/2024 0300  Gross per 24 hour   Intake 360 ml   Output 600 ml   Net -240 ml     Wt Readings from Last 3 Encounters:   10/20/24 246 lb 7.6 oz (111.8 kg)   10/07/24 240 lb (108.9 kg)   24 245 lb (111.1 kg)          General: ill appearing  Skin: no visible rashes  HEENT: NCAT  Cardiac: Regular rate and rhythm, no murmur/gallop or rub  Lungs: R base diminished + crackles  Abdomen: Soft, NTND  Extremities: warm, well perfused, no leg edema  Neurologic/Psych: mentating well  RIJ CVC  LUE AVF + thrill and bruit but small     LABORATORY DATA:     Lab Results   Component Value Date     (H) 10/20/2024    BUN 52 (H) 10/20/2024    BUNCREA 13.0 2022    CREATSERUM 7.74 (H) 10/20/2024    ANIONGAP 11 10/20/2024    GFR 59 (L) 2018    GFRNAA 30 (L) 2022    GFRAA 35 (L) 2022    CA 9.0 10/20/2024    OSMOCALC 300 (H) 10/20/2024    ALKPHO 70 10/17/2024    AST 33 10/17/2024    ALT 10 (L) 10/17/2024    BILT 0.4 10/17/2024    TP 7.0 10/17/2024    ALB 4.1 10/20/2024    GLOBULIN 3.6 10/17/2024     10/20/2024    K 4.8 10/20/2024     10/20/2024    CO2 23.0 10/20/2024     Lab Results   Component Value Date    WBC 6.5 10/18/2024    RBC 3.21 (L) 10/18/2024    HGB 10.3 (L) 10/18/2024    HCT 30.9 (L) 10/18/2024    .0 10/18/2024    MPV 11.5 2012    MCV 96.3 10/18/2024    MCH 32.1 10/18/2024    MCHC 33.3 10/18/2024    RDW 15.2 10/18/2024    NEPRELIM 4.09  10/18/2024    NEPERCENT 63.0 10/18/2024    LYPERCENT 17.8 10/18/2024    MOPERCENT 11.7 10/18/2024    EOPERCENT 6.0 10/18/2024    BAPERCENT 1.2 10/18/2024    NE 4.09 10/18/2024    LYMABS 1.16 10/18/2024    MOABSO 0.76 10/18/2024    EOABSO 0.39 10/18/2024    BAABSO 0.08 10/18/2024     Lab Results   Component Value Date    MALBP 167.00 05/24/2023    CREUR 142.00 05/24/2023    CREUR 142.00 05/24/2023     Lab Results   Component Value Date    COLORUR Colorless (A) 09/16/2024    CLARITY Clear 09/16/2024    SPECGRAVITY 1.012 09/16/2024    GLUUR Normal 09/16/2024    BILUR Negative 09/16/2024    KETUR Negative 09/16/2024    BLOODURINE 3+ (A) 09/16/2024    PHURINE 6.0 09/16/2024    PROUR 50 (A) 09/16/2024    UROBILINOGEN Normal 09/16/2024    NITRITE Negative 09/16/2024    LEUUR Negative 09/16/2024    WBCUR 1-5 09/16/2024    RBCUR 0-2 09/16/2024    EPIUR Few (A) 09/16/2024    BACUR None Seen 09/16/2024    CAOXUR Occasional (A) 04/20/2018    HYLUR Present (A) 05/14/2019         IMAGING:     Reviewed.    MEDICATIONS:       Current Facility-Administered Medications   Medication Dose Route Frequency    Lisdexamfetamine Dimesylate (VYVANSE) cap 60 mg - patient supplied  60 mg Oral Daily    hydrALAZINE (Apresoline) tab 50 mg  50 mg Oral Q8H MARTHA    sevelamer carbonate (Renvela) tab 1,600 mg  1,600 mg Oral TID CC    oxyCODONE immediate release tab 10 mg  10 mg Oral Q4H PRN    cyclobenzaprine (Flexeril) tab 10 mg  10 mg Oral TID PRN    HYDROmorphone (Dilaudid) 1 MG/ML injection 0.2 mg  0.2 mg Intravenous Q4H PRN    Or    HYDROmorphone (Dilaudid) 1 MG/ML injection 0.4 mg  0.4 mg Intravenous Q4H PRN    Or    HYDROmorphone (Dilaudid) 1 MG/ML injection 0.8 mg  0.8 mg Intravenous Q4H PRN    nitroGLYCERIN in dextrose 5% 50 mg/250mL infusion premix  5-400 mcg/min Intravenous Titrated    hydrALAzine (Apresoline) 20 mg/mL injection 10 mg  10 mg Intravenous Q6H PRN    labetalol (Trandate) 5 mg/mL injection 10 mg  10 mg Intravenous Q6H PRN     ondansetron (Zofran) 4 MG/2ML injection 4 mg  4 mg Intravenous Q6H PRN    acetaminophen (Tylenol) tab 650 mg  650 mg Oral Q6H PRN    albuterol (Ventolin HFA) 108 (90 Base) MCG/ACT inhaler 2 puff  2 puff Inhalation Q6H PRN    ARIPiprazole (Abilify) tab 15 mg  15 mg Oral Daily    buPROPion ER (Wellbutrin XL) 24 hr tab 150 mg  150 mg Oral Daily    fenofibrate micronized (Lofibra) cap 134 mg  134 mg Oral Nightly    FLUoxetine (PROzac) cap 40 mg  40 mg Oral Daily    fluticasone propionate (Flonase) 50 MCG/ACT nasal suspension 1 spray  1 spray Each Nare Daily    isosorbide mononitrate ER (Imdur) 24 hr tab 30 mg  30 mg Oral Daily    lamoTRIgine (LaMICtal) tab 150 mg  150 mg Oral Daily    losartan (Cozaar) tab 100 mg  100 mg Oral Daily    memantine (Namenda) tab 5 mg  5 mg Oral BID    NIFEdipine ER (Procardia-XL) 24 hr tab 60 mg  60 mg Oral BID    tamsulosin (Flomax) cap 0.4 mg  0.4 mg Oral Daily    umeclidinium bromide (Incruse Ellipta) 62.5 MCG/ACT inhaler 1 puff  1 puff Inhalation Daily    piperacillin-tazobactam (Zosyn) 3.375 g in dextrose 5% 100 mL IVPB-ADDV  3.375 g Intravenous Q12H    heparin (Porcine) 5000 UNIT/ML injection 5,000 Units  5,000 Units Subcutaneous Q8H MARTHA    carvedilol (Coreg) tab 25 mg  25 mg Oral BID       ASSESSMENT/PLAN:     43 yo M with history of ESRD on iHD MWF via RIJ CVC, LUE AVF not in use, hyperaldosteronism, DM, biopolar disorder, recurrent GI bleed presented with cough/sob. Being treated for pneumonia and volume overload.     ESRD:  -- HD tomorrow per MWF schedule, 3K, UF 2.5-3.5L as tolerated    HD directly managed  -- pt states today he would like to stay at his current HD unit     LUE AVF:  -- L arm precautions     Anemia in ESRD:  -- defer OWEN  as hgb > 10     MBD:  -- resume sevelamer to 1600 TID AC     Hyperkalemia:  -- improved with HD     HTN:  -- s/p clonidine, lasix, hydralazine, labetalol, nitroglycerine gtt  -- home meds: coreg 25 mg BID, imdur 30 mg/d, losartan 100 mg/d,  nifedipine XL 60 mg BID  -- hydralazine 50 changed to TID to avoid fluctuations  -- clonidine not restarted     Pneumonia:  -- abx per primary team    D/w RN. Will follow.      Samaria Nava MD  [facility]

## 2024-10-20 NOTE — PLAN OF CARE
Assumed care at 0730; Pt A/o x 4, stable VS with complaints of pain. Pain med provided; Saturating well on RA; Plan of care discussed with patient. Educated on fall risk and use of call light. Belongings and call light within reach. Bed at lowest position and locked.     Problem: CARDIOVASCULAR - ADULT  Goal: Maintains optimal cardiac output and hemodynamic stability  Description: INTERVENTIONS:  - Monitor vital signs, rhythm, and trends  - Monitor for bleeding, hypotension and signs of decreased cardiac output  - Evaluate effectiveness of vasoactive medications to optimize hemodynamic stability  - Monitor arterial and/or venous puncture sites for bleeding and/or hematoma  - Assess quality of pulses, skin color and temperature  - Assess for signs of decreased coronary artery perfusion - ex. Angina  - Evaluate fluid balance, assess for edema, trend weights  Outcome: Progressing  Goal: Absence of cardiac arrhythmias or at baseline  Description: INTERVENTIONS:  - Continuous cardiac monitoring, monitor vital signs, obtain 12 lead EKG if indicated  - Evaluate effectiveness of antiarrhythmic and heart rate control medications as ordered  - Initiate emergency measures for life threatening arrhythmias  - Monitor electrolytes and administer replacement therapy as ordered  Outcome: Progressing     Problem: PAIN - ADULT  Goal: Verbalizes/displays adequate comfort level or patient's stated pain goal  Description: INTERVENTIONS:  - Encourage pt to monitor pain and request assistance  - Assess pain using appropriate pain scale  - Administer analgesics based on type and severity of pain and evaluate response  - Implement non-pharmacological measures as appropriate and evaluate response  - Consider cultural and social influences on pain and pain management  - Manage/alleviate anxiety  - Utilize distraction and/or relaxation techniques  - Monitor for opioid side effects  - Notify MD/LIP if interventions unsuccessful or patient  reports new pain  - Anticipate increased pain with activity and pre-medicate as appropriate  Outcome: Progressing     Problem: Patient/Family Goals  Goal: Patient/Family Long Term Goal  Description: Patient's Long Term Goal: discharge home    Interventions:  - Hospitalist/Renal, labs, STAT dialysis, scheduled bp meds, prn pain meds, IV abx, daily weight, expanded flu panel, nitroglycerin gtt  - See additional Care Plan goals for specific interventions  Outcome: Progressing  Goal: Patient/Family Short Term Goal  Description: Patient's Short Term Goal: control blood pressure    Interventions:   - scheduled bp meds, prn pain meds, STAT dialysis  - See additional Care Plan goals for specific interventions  Outcome: Progressing     Problem: HEMATOLOGIC - ADULT  Goal: Free from bleeding injury  Description: (Example usage: patient with low platelets)  INTERVENTIONS:  - Avoid intramuscular injections, enemas and rectal medication administration  - Ensure safe mobilization of patient  - Hold pressure on venipuncture sites to achieve adequate hemostasis  - Assess for signs and symptoms of internal bleeding  - Monitor lab trends  - Patient is to report abnormal signs of bleeding to staff  - Avoid use of toothpicks and dental floss  - Use electric shaver for shaving  - Use soft bristle tooth brush  - Limit straining and forceful nose blowing  Outcome: Progressing

## 2024-10-20 NOTE — SPIRITUAL CARE NOTE
Spiritual Care Visit Note    Patient Name: Godwin Fonseca Date of Spiritual Care Visit: 10/20/24   : 1980 Primary Dx: Pneumonia of right lower lobe due to infectious organism       Referred By: Referral From: Nurse    Spiritual Care Taxonomy:    Intended Effects: Demonstrate caring and concern    Methods: Collaborate with care team member;Encouraging spiritual/Uatsdin practices;Offer spiritual/Uatsdin support    Interventions: Active listening;Ask guided questions;Explain  role;Prayer for healing    Visit Type/Summary:     - Spiritual Care: Consulted with RN prior to visit. Offered empathic listening and emotional support. Provided information regarding how to contact Spiritual Care and left a Spiritual Care information card.  remains available as needed for follow up.  - PoA: Other: Provided education regarding PoA for Healthcare. Left PoA information with patient for review.   assessed for other spiritual needs.  Left PoA information and Spiritual Care contact information. Patient is a [employer] but does not attend any Uatsdin services anymore. Patient is taking care of his mother who is 75 years old and also had a stroke four years ago. Patient stated that his mother id mobile now and they are supported by his family.      Spiritual Care support can be requested via an Epic consult. For urgent/immediate needs, please contact the On Call  at: [place]: ext [phone]           Chaplain Resident Ida stanford MA

## 2024-10-21 LAB
ALBUMIN SERPL-MCNC: 4 G/DL (ref 3.2–4.8)
ANION GAP SERPL CALC-SCNC: 14 MMOL/L (ref 0–18)
BUN BLD-MCNC: 51 MG/DL (ref 9–23)
CALCIUM BLD-MCNC: 9.3 MG/DL (ref 8.7–10.4)
CHLORIDE SERPL-SCNC: 103 MMOL/L (ref 98–112)
CO2 SERPL-SCNC: 19 MMOL/L (ref 21–32)
CREAT BLD-MCNC: 8.92 MG/DL
EGFRCR SERPLBLD CKD-EPI 2021: 7 ML/MIN/1.73M2 (ref 60–?)
GLUCOSE BLD-MCNC: 91 MG/DL (ref 70–99)
OSMOLALITY SERPL CALC.SUM OF ELEC: 295 MOSM/KG (ref 275–295)
PHOSPHATE SERPL-MCNC: 8.2 MG/DL (ref 2.4–5.1)
POTASSIUM SERPL-SCNC: 5 MMOL/L (ref 3.5–5.1)
SODIUM SERPL-SCNC: 136 MMOL/L (ref 136–145)

## 2024-10-21 PROCEDURE — 80069 RENAL FUNCTION PANEL: CPT | Performed by: INTERNAL MEDICINE

## 2024-10-21 PROCEDURE — 90935 HEMODIALYSIS ONE EVALUATION: CPT | Performed by: INTERNAL MEDICINE

## 2024-10-21 RX ORDER — HYDRALAZINE HYDROCHLORIDE 50 MG/1
50 TABLET, FILM COATED ORAL EVERY 8 HOURS SCHEDULED
Qty: 90 TABLET | Refills: 0 | Status: SHIPPED | OUTPATIENT
Start: 2024-10-21

## 2024-10-21 RX ORDER — OXYCODONE HYDROCHLORIDE 10 MG/1
10 TABLET ORAL EVERY 4 HOURS PRN
Qty: 20 TABLET | Refills: 0 | Status: SHIPPED | OUTPATIENT
Start: 2024-10-21

## 2024-10-21 NOTE — PLAN OF CARE
NURSING NOTES:  0800: Pt received AOx4. Room air. SR. Saline lock. Held BP meds- will have dialysis today.  1330: Dilaudid IV for neck pain. Pt refused Oxycodone PO pain meds. Pt may discharge after HD today.  1600: Hemodialysis catheter clogged. Dr. Nava at bedside and ordered for TPA.  1800: Hemodialysis started. Discharge order cancelled d/t dialysis will end up too late. Dr. Freedman and Dr. Nava awared.    Problem: CARDIOVASCULAR - ADULT  Goal: Maintains optimal cardiac output and hemodynamic stability  Description: INTERVENTIONS:  - Monitor vital signs, rhythm, and trends  - Monitor for bleeding, hypotension and signs of decreased cardiac output  - Evaluate effectiveness of vasoactive medications to optimize hemodynamic stability  - Monitor arterial and/or venous puncture sites for bleeding and/or hematoma  - Assess quality of pulses, skin color and temperature  - Assess for signs of decreased coronary artery perfusion - ex. Angina  - Evaluate fluid balance, assess for edema, trend weights  Outcome: Progressing     Problem: PAIN - ADULT  Goal: Verbalizes/displays adequate comfort level or patient's stated pain goal  Description: INTERVENTIONS:  - Encourage pt to monitor pain and request assistance  - Assess pain using appropriate pain scale  - Administer analgesics based on type and severity of pain and evaluate response  - Implement non-pharmacological measures as appropriate and evaluate response  - Consider cultural and social influences on pain and pain management  - Manage/alleviate anxiety  - Utilize distraction and/or relaxation techniques  - Monitor for opioid side effects  - Notify MD/LIP if interventions unsuccessful or patient reports new pain  - Anticipate increased pain with activity and pre-medicate as appropriate  Outcome: Progressing

## 2024-10-21 NOTE — PLAN OF CARE
Assumed care of patient at 1930. Aox4. RA. NSR. Pt reports severe pain. Continent of bowel and bladder. SBA. Bed locked and in lowest position. Call light and personal items within reach.    -R-upper chest permacath  -LUE (Wrist AV fistula)    Dialysis scheduled MWF, next 10/21. [facility] called and notified for HD scheduling    Problem: CARDIOVASCULAR - ADULT  Goal: Maintains optimal cardiac output and hemodynamic stability  Description: INTERVENTIONS:  - Monitor vital signs, rhythm, and trends  - Monitor for bleeding, hypotension and signs of decreased cardiac output  - Evaluate effectiveness of vasoactive medications to optimize hemodynamic stability  - Monitor arterial and/or venous puncture sites for bleeding and/or hematoma  - Assess quality of pulses, skin color and temperature  - Assess for signs of decreased coronary artery perfusion - ex. Angina  - Evaluate fluid balance, assess for edema, trend weights  Outcome: Progressing  Goal: Absence of cardiac arrhythmias or at baseline  Description: INTERVENTIONS:  - Continuous cardiac monitoring, monitor vital signs, obtain 12 lead EKG if indicated  - Evaluate effectiveness of antiarrhythmic and heart rate control medications as ordered  - Initiate emergency measures for life threatening arrhythmias  - Monitor electrolytes and administer replacement therapy as ordered  Outcome: Progressing     Problem: PAIN - ADULT  Goal: Verbalizes/displays adequate comfort level or patient's stated pain goal  Description: INTERVENTIONS:  - Encourage pt to monitor pain and request assistance  - Assess pain using appropriate pain scale  - Administer analgesics based on type and severity of pain and evaluate response  - Implement non-pharmacological measures as appropriate and evaluate response  - Consider cultural and social influences on pain and pain management  - Manage/alleviate anxiety  - Utilize distraction and/or relaxation techniques  - Monitor for opioid side  effects  - Notify MD/LIP if interventions unsuccessful or patient reports new pain  - Anticipate increased pain with activity and pre-medicate as appropriate  Outcome: Progressing     Problem: Patient/Family Goals  Goal: Patient/Family Long Term Goal  Description: Patient's Long Term Goal: discharge home    Interventions:  - Hospitalist/Renal, labs, STAT dialysis, scheduled bp meds, prn pain meds, IV abx, daily weight, expanded flu panel, nitroglycerin gtt  - See additional Care Plan goals for specific interventions  Outcome: Progressing  Goal: Patient/Family Short Term Goal  Description: Patient's Short Term Goal: control blood pressure    Interventions:   - scheduled bp meds, prn pain meds, STAT dialysis  - See additional Care Plan goals for specific interventions  Outcome: Progressing     Problem: HEMATOLOGIC - ADULT  Goal: Free from bleeding injury  Description: (Example usage: patient with low platelets)  INTERVENTIONS:  - Avoid intramuscular injections, enemas and rectal medication administration  - Ensure safe mobilization of patient  - Hold pressure on venipuncture sites to achieve adequate hemostasis  - Assess for signs and symptoms of internal bleeding  - Monitor lab trends  - Patient is to report abnormal signs of bleeding to staff  - Avoid use of toothpicks and dental floss  - Use electric shaver for shaving  - Use soft bristle tooth brush  - Limit straining and forceful nose blowing  Outcome: Progressing

## 2024-10-21 NOTE — PROGRESS NOTES
[facility]   part of [facility]    Nephrology Progress Note    Godwin Fonseca Attending:  Phong Freedman DO       SUBJECTIVE:     No complaints  Catheter sluggish     PHYSICAL EXAM:     Vital Signs: /88 (BP Location: Right arm)   Pulse 89   Temp 97.6 °F (36.4 °C) (Oral)   Resp 18   Ht 6' 2\" (1.88 m)   Wt 248 lb 14.4 oz (112.9 kg)   SpO2 97%   BMI 31.96 kg/m²   Temp (24hrs), Av.6 °F (36.4 °C), Min:97.5 °F (36.4 °C), Max:97.7 °F (36.5 °C)       Intake/Output Summary (Last 24 hours) at 10/21/2024 1651  Last data filed at 10/21/2024 1314  Gross per 24 hour   Intake 360 ml   Output 1500 ml   Net -1140 ml     Wt Readings from Last 3 Encounters:   10/21/24 248 lb 14.4 oz (112.9 kg)   10/07/24 240 lb (108.9 kg)   24 245 lb (111.1 kg)          General: ill appearing  Skin: no visible rashes  HEENT: NCAT  Cardiac: Regular rate and rhythm, no murmur/gallop or rub  Lungs: R base diminished + crackles  Abdomen: Soft, NTND  Extremities: warm, well perfused, no leg edema  Neurologic/Psych: mentating well  RIJ CVC  LUE AVF + thrill and bruit but small     LABORATORY DATA:     Lab Results   Component Value Date    GLU 91 10/21/2024    BUN 51 (H) 10/21/2024    BUNCREA 13.0 2022    CREATSERUM 8.92 (H) 10/21/2024    ANIONGAP 14 10/21/2024    GFR 59 (L) 2018    GFRNAA 30 (L) 2022    GFRAA 35 (L) 2022    CA 9.3 10/21/2024    OSMOCALC 295 10/21/2024    ALKPHO 70 10/17/2024    AST 33 10/17/2024    ALT 10 (L) 10/17/2024    BILT 0.4 10/17/2024    TP 7.0 10/17/2024    ALB 4.0 10/21/2024    GLOBULIN 3.6 10/17/2024     10/21/2024    K 5.0 10/21/2024     10/21/2024    CO2 19.0 (L) 10/21/2024     Lab Results   Component Value Date    WBC 6.5 10/18/2024    RBC 3.21 (L) 10/18/2024    HGB 10.3 (L) 10/18/2024    HCT 30.9 (L) 10/18/2024    .0 10/18/2024    MPV 11.5 2012    MCV 96.3 10/18/2024    MCH 32.1 10/18/2024    MCHC 33.3 10/18/2024    RDW 15.2 10/18/2024     NEPRELIM 4.09 10/18/2024    NEPERCENT 63.0 10/18/2024    LYPERCENT 17.8 10/18/2024    MOPERCENT 11.7 10/18/2024    EOPERCENT 6.0 10/18/2024    BAPERCENT 1.2 10/18/2024    NE 4.09 10/18/2024    LYMABS 1.16 10/18/2024    MOABSO 0.76 10/18/2024    EOABSO 0.39 10/18/2024    BAABSO 0.08 10/18/2024     Lab Results   Component Value Date    MALBP 167.00 05/24/2023    CREUR 142.00 05/24/2023    CREUR 142.00 05/24/2023     Lab Results   Component Value Date    COLORUR Colorless (A) 09/16/2024    CLARITY Clear 09/16/2024    SPECGRAVITY 1.012 09/16/2024    GLUUR Normal 09/16/2024    BILUR Negative 09/16/2024    KETUR Negative 09/16/2024    BLOODURINE 3+ (A) 09/16/2024    PHURINE 6.0 09/16/2024    PROUR 50 (A) 09/16/2024    UROBILINOGEN Normal 09/16/2024    NITRITE Negative 09/16/2024    LEUUR Negative 09/16/2024    WBCUR 1-5 09/16/2024    RBCUR 0-2 09/16/2024    EPIUR Few (A) 09/16/2024    BACUR None Seen 09/16/2024    CAOXUR Occasional (A) 04/20/2018    HYLUR Present (A) 05/14/2019         IMAGING:     Reviewed.    MEDICATIONS:       Current Facility-Administered Medications   Medication Dose Route Frequency    sodium chloride 0.9 % IV bolus 100 mL  100 mL Intravenous Q30 Min PRN    And    albumin human (Albumin) 25% injection 25 g  25 g Intravenous PRN Dialysis    heparin (Porcine) 1000 UNIT/ML injection 1,500 Units  1.5 mL Intracatheter Once    Lisdexamfetamine Dimesylate (VYVANSE) cap 60 mg - patient supplied  60 mg Oral Daily    hydrALAZINE (Apresoline) tab 50 mg  50 mg Oral Q8H MARTHA    sevelamer carbonate (Renvela) tab 1,600 mg  1,600 mg Oral TID CC    oxyCODONE immediate release tab 10 mg  10 mg Oral Q4H PRN    cyclobenzaprine (Flexeril) tab 10 mg  10 mg Oral TID PRN    HYDROmorphone (Dilaudid) 1 MG/ML injection 0.2 mg  0.2 mg Intravenous Q4H PRN    Or    HYDROmorphone (Dilaudid) 1 MG/ML injection 0.4 mg  0.4 mg Intravenous Q4H PRN    Or    HYDROmorphone (Dilaudid) 1 MG/ML injection 0.8 mg  0.8 mg Intravenous Q4H PRN     nitroGLYCERIN in dextrose 5% 50 mg/250mL infusion premix  5-400 mcg/min Intravenous Titrated    hydrALAzine (Apresoline) 20 mg/mL injection 10 mg  10 mg Intravenous Q6H PRN    labetalol (Trandate) 5 mg/mL injection 10 mg  10 mg Intravenous Q6H PRN    ondansetron (Zofran) 4 MG/2ML injection 4 mg  4 mg Intravenous Q6H PRN    acetaminophen (Tylenol) tab 650 mg  650 mg Oral Q6H PRN    albuterol (Ventolin HFA) 108 (90 Base) MCG/ACT inhaler 2 puff  2 puff Inhalation Q6H PRN    ARIPiprazole (Abilify) tab 15 mg  15 mg Oral Daily    buPROPion ER (Wellbutrin XL) 24 hr tab 150 mg  150 mg Oral Daily    fenofibrate micronized (Lofibra) cap 134 mg  134 mg Oral Nightly    FLUoxetine (PROzac) cap 40 mg  40 mg Oral Daily    fluticasone propionate (Flonase) 50 MCG/ACT nasal suspension 1 spray  1 spray Each Nare Daily    isosorbide mononitrate ER (Imdur) 24 hr tab 30 mg  30 mg Oral Daily    lamoTRIgine (LaMICtal) tab 150 mg  150 mg Oral Daily    losartan (Cozaar) tab 100 mg  100 mg Oral Daily    memantine (Namenda) tab 5 mg  5 mg Oral BID    NIFEdipine ER (Procardia-XL) 24 hr tab 60 mg  60 mg Oral BID    tamsulosin (Flomax) cap 0.4 mg  0.4 mg Oral Daily    umeclidinium bromide (Incruse Ellipta) 62.5 MCG/ACT inhaler 1 puff  1 puff Inhalation Daily    piperacillin-tazobactam (Zosyn) 3.375 g in dextrose 5% 100 mL IVPB-ADDV  3.375 g Intravenous Q12H    heparin (Porcine) 5000 UNIT/ML injection 5,000 Units  5,000 Units Subcutaneous Q8H MARTHA    carvedilol (Coreg) tab 25 mg  25 mg Oral BID       ASSESSMENT/PLAN:     45 yo M with history of ESRD on iHD MWF via RIJ CVC, LUE AVF not in use, hyperaldosteronism, DM, biopolar disorder, recurrent GI bleed presented with cough/sob. Being treated for pneumonia and volume overload.     ESRD:  -- HD todayper MWF schedule, 3K, UF 2.5-3.5L as tolerated    HD directly managed  Sluggish flow in catheter, plan for TPA and recheck  -- pt states today he would like to stay at his current HD unit     NINOSKA  AVF:  -- L arm precautions     Anemia in ESRD:  -- defer OWEN  as hgb > 10     MBD:  -- resume sevelamer to 1600 TID AC     Hyperkalemia:  -- improved with HD     HTN:  -- s/p clonidine, lasix, hydralazine, labetalol, nitroglycerine gtt  -- home meds: coreg 25 mg BID, imdur 30 mg/d, losartan 100 mg/d, nifedipine XL 60 mg BID  -- hydralazine 50 changed to TID to avoid fluctuations  -- clonidine not restarted  -- continue above regimen on discharge      Pneumonia:  -- abx per primary team    D/w RN. Will follow.      Samaria Nava MD  [facility]

## 2024-10-21 NOTE — CM/SW NOTE
SEFERINO called [facility] ([phone]) and spoke with Briana. SEFERINO inquired about status of this pt's clinic transfer. Briana informed SEFERINO that she had called and spoken to pt to notify him that his preferred clinics ([facility]) do not have availability for his preferred scheduled. Briana stated pt was agreeable with remaining at his [facility] with his regular scheduled of MWF at 10:45am.    SEFERINO met with pt at bedside to discuss the above. Pt aware that there is no current availability at his preferred clinics and is agreeable with remaninig at his [facility].     Plan will be for pt to DC today after he gets HD.     SW to fax H&P and AVS to [facility] when available (F: [phone]).     to remain available for support and/or discharge planning.    Addendum: SEFERINO faxed H&P and AVS to [facility] (F:[phone]).    JOSH Walters  Discharge Planner  [phone]

## 2024-10-21 NOTE — DISCHARGE SUMMARY
[facility] Internal Medicine Hospitalist Discharge Summary     Patient ID:  Godwin Fonseca  44 year old  4/12/1980    Admit date: 10/17/2024    Discharge date and time: 10/21/2024    Attending Physician: Phong Freedman DO     Primary Care Physician: Adrian Small MD     Discharge Diagnoses:   Hypertensive urgency   Possible RLL PNA  Chronic neck pain  ESRD on HD  Chronic diastolic HF  Chronic abd pain  Chronic back pain  Opioid dependence   HLD  COPD  Depression  Bipolar d/o    Please note that only IHP DMG and EMG patients enrolled in the Medicare ACO, [facility] ACO and [facility] HMOs will be handled by the [facility] Care Management team.  For all other patients, please follow usual protocol for discharge care transition.    Discharge Condition: stable    Disposition:  Home    Important Follow up:  - PCP: within 3 days   - Consults: Renal     Follow Up Items:  None    Hospital Course:      44 yr old male with PMH sig for  ESRD on HD (M/W/F), bipolar, depression, DM 2, HTN, DL, CAD, COPD, HFpEF, and chronic abdominal pain who presented to the ED for evaluation of cough and SOB.      # Hypertensive emergency   - s/p NGT gtt  - HD today per renal  - restart home BP meds coreg 25 mg BID, imdur 30 mg/d, losartan 100 mg/d, nifedipine XL 60 mg/d.  Hydralazine increased to TID.  - clonidine not restarted   - PRN IV labetalol and hydralazine      # Suspected RLL pneumonia   - however changes on CXR could also be due to fluid overload now better s/p HD  - cont empiric zosyn for cover for HCAP, pt can resume po levaquin on discharge   - expanded flu panel neg   - f/u Bcx's (NGTD)     # Chronic neck pain with cervical radiculopathy   - pt has failed steroid injections, was to see spine surgeon at [facility] but needed cardiac clearance.    - repeat MRI c-spine showed mild DJD.  Results discussed with pt, recommend outpt f/u with spine surgeon, no need for inpatient spine  consult.     # ESRD on HD  - cont HD per renal  - renal c/s appreciated      # Chronic diastolic HF  - volume control with HD per renal      # Hx of GI bleed  - seen by GI last admit  - f/u with GI for capsule study  - hemorrhoid surgery when possible      # Chronic abd pain  # Chronic back pain  # Opioid dependence   - follows with pain clinic as outpt   - changed norco to PRN oxy IR.  Added flexeril PRN.  - wean off IV narcotics when possible   - repeat MRI c-spine showed mild DJD   - pt to f/u with spine surgeon      # HLD  - cont statin     # COPD  - no evidence of acute exacerbation   - resume home inhalers      # Major depression, recurrent   # Bipolar d/o  - stable  - resume home medications     # SARAH  - PCP is having difficult time getting pt in to see pulm as outpt for SARAH tx.  This could be causing sleepiness and him to miss HD sessions.   - message sent to pulm about setting up pulm outpt f/u appt.    Stable for discharge home.     Consults: IP CONSULT TO NEPHROLOGY  IP CONSULT TO HOSPITALIST  IP CONSULT TO NEPHROLOGY  IP CONSULT TO HOSPITALIST  IP CONSULT TO SOCIAL WORK  IP CONSULT TO SPIRITUAL CARE    Operative Procedures:  None      Patient instructions:      I as the attending physician reconciled the current and discharge medications on day of discharge.     Current Discharge Medication List        START taking these medications    Details   oxyCODONE 10 MG Oral Tab Take 1 tablet (10 mg total) by mouth every 4 (four) hours as needed for Pain.           CONTINUE these medications which have CHANGED    Details   hydrALAZINE 50 MG Oral Tab Take 1 tablet (50 mg total) by mouth every 8 (eight) hours.           CONTINUE these medications which have NOT CHANGED    Details   memantine 5 MG Oral Tab Take 1 tablet (5 mg total) by mouth 2 (two) times daily.      Lisdexamfetamine Dimesylate 70 MG Oral Cap Take 1 capsule (70 mg total) by mouth every morning.      levoFLOXacin 250 MG Oral Tab TAKE 2 TABLETs BY  MOUTH one time then take 1 tablet every 48 hours for 30 days      albuterol 108 (90 Base) MCG/ACT Inhalation Aero Soln Inhale 2 puffs into the lungs every 6 (six) hours as needed for Wheezing.      tiotropium 18 MCG Inhalation Cap Inhale 1 capsule (18 mcg total) into the lungs daily.      tamsulosin 0.4 MG Oral Cap Take 1 capsule (0.4 mg total) by mouth daily.      ARIPiprazole 15 MG Oral Tab Take 1 tablet (15 mg total) by mouth daily.      isosorbide mononitrate ER 30 MG Oral Tablet 24 Hr Take 1 tablet (30 mg total) by mouth daily. Hold if systolic blood pressure <130      buPROPion  MG Oral Tablet 24 Hr Take 1 tablet (150 mg total) by mouth daily.      lamoTRIgine (LAMICTAL) 150 MG Oral Tab Take 1 tablet (150 mg total) by mouth daily.      FLUoxetine HCl 40 MG Oral Cap Take 1 capsule (40 mg total) by mouth daily.      RENVELA 800 MG Oral Tab Take 1 tablet (800 mg total) by mouth 3 (three) times daily with meals.      losartan 100 MG Oral Tab Take 1 tablet (100 mg total) by mouth daily. Hold if systolic blood pressure <130      NIFEdipine ER 60 MG Oral Tablet 24 Hr Take 1 tablet (60 mg total) by mouth in the morning and 1 tablet (60 mg total) before bedtime. Hold if systolic blood pressure <130.      carvedilol 25 MG Oral Tab Take 1 tablet (25 mg total) by mouth in the morning and 1 tablet (25 mg total) in the evening. Take with meals.      Fenofibrate 134 MG Oral Cap Take 1 capsule by mouth nightly.      Fluticasone Propionate 50 MCG/ACT Nasal Suspension SPRAY ONCE INTO EACH NOSTRIL BID PRN           STOP taking these medications       HYDROcodone-acetaminophen (NORCO)  MG Oral Tab        cloNIDine 0.1 MG Oral Tab        HYDROcodone-acetaminophen 5-325 MG Oral Tab        Vancomycin HCl in NaCl 1-0.9 GM/250ML-% Intravenous Solution        ceFAZolin in sodium chloride 0.9% 3 g/100mL Intravenous Solution        ceFAZolin Sodium 2 g Intravenous Recon Soln        ceFAZolin Sodium 2 g Intravenous Recon Soln               Activity: activity as tolerated  Diet: renal diet  Wound Care: as directed  Code Status: Full Code      Exam on day of discharge:     Vitals:    10/21/24 0733   BP: 126/88   Pulse: 82   Resp: 16   Temp: 97.7 °F (36.5 °C)       Gen: No acute distress, alert and oriented x3, no focal neurologic deficits.  Chronically ill appearing male.    HEENT:  EOMI, PERRLA, OP clear, MMM  Pulm:  + improving R basilar crackles, normal respiratory effort  CV: Heart with regular rate and rhythm, no murmur.  Normal PMI.    Abd: Abdomen soft, nontender, nondistended, no organomegaly, bowel sounds present  MSK: Full range of motion in extremities, no clubbing, no cyanosis  Skin: no rashes or lesions  Neuro:  Grossly intact, no focal deficits  Lines: RIALEKSANDRA CVC  NINOSKA AVJAYDE         Total time coordinating care for discharge: Greater than 30 minutes    Phong Freedman DO  [facility] Hospitalist

## 2024-10-21 NOTE — BH PROGRESS NOTE
Writer attempted to meet with patient two separate times. Both times patient was with the doctor.

## 2024-10-22 VITALS
WEIGHT: 241.19 LBS | HEART RATE: 89 BPM | HEIGHT: 74 IN | TEMPERATURE: 98 F | BODY MASS INDEX: 30.95 KG/M2 | RESPIRATION RATE: 16 BRPM | DIASTOLIC BLOOD PRESSURE: 87 MMHG | OXYGEN SATURATION: 97 % | SYSTOLIC BLOOD PRESSURE: 122 MMHG

## 2024-10-22 LAB
ALBUMIN SERPL-MCNC: 4.8 G/DL (ref 3.2–4.8)
ANION GAP SERPL CALC-SCNC: 10 MMOL/L (ref 0–18)
BUN BLD-MCNC: 33 MG/DL (ref 9–23)
CALCIUM BLD-MCNC: 9.6 MG/DL (ref 8.7–10.4)
CHLORIDE SERPL-SCNC: 98 MMOL/L (ref 98–112)
CO2 SERPL-SCNC: 26 MMOL/L (ref 21–32)
CREAT BLD-MCNC: 6.07 MG/DL
EGFRCR SERPLBLD CKD-EPI 2021: 11 ML/MIN/1.73M2 (ref 60–?)
GLUCOSE BLD-MCNC: 90 MG/DL (ref 70–99)
OSMOLALITY SERPL CALC.SUM OF ELEC: 285 MOSM/KG (ref 275–295)
PHOSPHATE SERPL-MCNC: 7.3 MG/DL (ref 2.4–5.1)
POTASSIUM SERPL-SCNC: 4.6 MMOL/L (ref 3.5–5.1)
SODIUM SERPL-SCNC: 134 MMOL/L (ref 136–145)

## 2024-10-22 PROCEDURE — 80069 RENAL FUNCTION PANEL: CPT | Performed by: INTERNAL MEDICINE

## 2024-10-22 NOTE — DISCHARGE SUMMARY
[facility] Internal Medicine Hospitalist Discharge Summary     Patient ID:  Godwin Fonseca  44 year old  4/12/1980    Admit date: 10/17/2024    Discharge date and time: 10/22/2024    Attending Physician: Phong Freedman DO     Primary Care Physician: Adrian Small MD     Discharge Diagnoses:   Hypertensive urgency   Possible RLL PNA  Chronic neck pain  ESRD on HD  Chronic diastolic HF  Chronic abd pain  Chronic back pain  Opioid dependence   HLD  COPD  Depression  Bipolar d/o    Please note that only IHP DMG and EMG patients enrolled in the Medicare ACO, [facility] ACO and [facility] HMOs will be handled by the [facility] Care Management team.  For all other patients, please follow usual protocol for discharge care transition.    Discharge Condition: stable    Disposition:  Home    Important Follow up:  - PCP: Dr. Small 10/24/24 at 2:30 pm  - Consults: Renal     Follow Up Items:  None    Hospital Course:      44 yr old male with PMH sig for  ESRD on HD (M/W/F), bipolar, depression, DM 2, HTN, DL, CAD, COPD, HFpEF, and chronic abdominal pain who presented to the ED for evaluation of cough and SOB.      # Hypertensive emergency   - s/p NGT gtt  - HD today per renal  - restart home BP meds coreg 25 mg BID, imdur 30 mg/d, losartan 100 mg/d, nifedipine XL 60 mg/d.  Hydralazine increased to TID.  - clonidine not restarted   - PRN IV labetalol and hydralazine      # Suspected RLL pneumonia   - however changes on CXR could also be due to fluid overload now better s/p HD  - cont empiric zosyn for cover for HCAP, pt can resume po levaquin on discharge   - expanded flu panel neg   - f/u Bcx's (NGTD)     # Chronic neck pain with cervical radiculopathy   - pt has failed steroid injections, was to see spine surgeon at [facility] but needed cardiac clearance.    - repeat MRI c-spine showed mild DJD.  Results discussed with pt, recommend outpt f/u with spine surgeon, no need for  inpatient spine consult.     # ESRD on HD  - cont HD per renal  - renal c/s appreciated      # Chronic diastolic HF  - volume control with HD per renal      # Hx of GI bleed  - seen by GI last admit  - f/u with GI for capsule study  - hemorrhoid surgery when possible      # Chronic abd pain  # Chronic back pain  # Opioid dependence   - follows with pain clinic as outpt   - changed norco to PRN oxy IR.  Added flexeril PRN.  - wean off IV narcotics when possible   - repeat MRI c-spine showed mild DJD   - pt to f/u with spine surgeon      # HLD  - cont statin     # COPD  - no evidence of acute exacerbation   - resume home inhalers      # Major depression, recurrent   # Bipolar d/o  - stable  - resume home medications     # SARAH  - PCP is having difficult time getting pt in to see pulm as outpt for SARAH tx.  This could be causing sleepiness and him to miss HD sessions.   - message sent to pulm about setting up pulm outpt f/u appt.    Stable for discharge home.     Consults: IP CONSULT TO NEPHROLOGY  IP CONSULT TO HOSPITALIST  IP CONSULT TO NEPHROLOGY  IP CONSULT TO HOSPITALIST  IP CONSULT TO SOCIAL WORK  IP CONSULT TO SPIRITUAL CARE    Operative Procedures:  None      Patient instructions:      I as the attending physician reconciled the current and discharge medications on day of discharge.     Current Discharge Medication List        START taking these medications    Details   oxyCODONE 10 MG Oral Tab Take 1 tablet (10 mg total) by mouth every 4 (four) hours as needed for Pain.           CONTINUE these medications which have CHANGED    Details   hydrALAZINE 50 MG Oral Tab Take 1 tablet (50 mg total) by mouth every 8 (eight) hours.           CONTINUE these medications which have NOT CHANGED    Details   memantine 5 MG Oral Tab Take 1 tablet (5 mg total) by mouth 2 (two) times daily.      Lisdexamfetamine Dimesylate 70 MG Oral Cap Take 1 capsule (70 mg total) by mouth every morning.      levoFLOXacin 250 MG Oral Tab TAKE  2 TABLETs BY MOUTH one time then take 1 tablet every 48 hours for 30 days      albuterol 108 (90 Base) MCG/ACT Inhalation Aero Soln Inhale 2 puffs into the lungs every 6 (six) hours as needed for Wheezing.      tiotropium 18 MCG Inhalation Cap Inhale 1 capsule (18 mcg total) into the lungs daily.      tamsulosin 0.4 MG Oral Cap Take 1 capsule (0.4 mg total) by mouth daily.      ARIPiprazole 15 MG Oral Tab Take 1 tablet (15 mg total) by mouth daily.      isosorbide mononitrate ER 30 MG Oral Tablet 24 Hr Take 1 tablet (30 mg total) by mouth daily. Hold if systolic blood pressure <130      buPROPion  MG Oral Tablet 24 Hr Take 1 tablet (150 mg total) by mouth daily.      lamoTRIgine (LAMICTAL) 150 MG Oral Tab Take 1 tablet (150 mg total) by mouth daily.      FLUoxetine HCl 40 MG Oral Cap Take 1 capsule (40 mg total) by mouth daily.      RENVELA 800 MG Oral Tab Take 1 tablet (800 mg total) by mouth 3 (three) times daily with meals.      losartan 100 MG Oral Tab Take 1 tablet (100 mg total) by mouth daily. Hold if systolic blood pressure <130      NIFEdipine ER 60 MG Oral Tablet 24 Hr Take 1 tablet (60 mg total) by mouth in the morning and 1 tablet (60 mg total) before bedtime. Hold if systolic blood pressure <130.      carvedilol 25 MG Oral Tab Take 1 tablet (25 mg total) by mouth in the morning and 1 tablet (25 mg total) in the evening. Take with meals.      Fenofibrate 134 MG Oral Cap Take 1 capsule by mouth nightly.      Fluticasone Propionate 50 MCG/ACT Nasal Suspension SPRAY ONCE INTO EACH NOSTRIL BID PRN           STOP taking these medications       HYDROcodone-acetaminophen (NORCO)  MG Oral Tab        cloNIDine 0.1 MG Oral Tab        HYDROcodone-acetaminophen 5-325 MG Oral Tab        Vancomycin HCl in NaCl 1-0.9 GM/250ML-% Intravenous Solution        ceFAZolin in sodium chloride 0.9% 3 g/100mL Intravenous Solution        ceFAZolin Sodium 2 g Intravenous Recon Soln        ceFAZolin Sodium 2 g  Intravenous Recon Soln              Activity: activity as tolerated  Diet: renal diet  Wound Care: as directed  Code Status: Full Code      Exam on day of discharge:     Vitals:    10/22/24 0735   BP: (!) 136/99   Pulse: 76   Resp: 18   Temp: 98.4 °F (36.9 °C)       Gen: No acute distress, alert and oriented x3, no focal neurologic deficits.  Chronically ill appearing male.    HEENT:  EOMI, PERRLA, OP clear, MMM  Pulm:  + improving R basilar crackles, normal respiratory effort  CV: Heart with regular rate and rhythm, no murmur.  Normal PMI.    Abd: Abdomen soft, nontender, nondistended, no organomegaly, bowel sounds present  MSK: Full range of motion in extremities, no clubbing, no cyanosis  Skin: no rashes or lesions  Neuro:  Grossly intact, no focal deficits  Lines: RIJ CVC  NINOSKA AVF         Total time coordinating care for discharge: Greater than 30 minutes    Phong Freedman DO  [facility] Hospitalist

## 2024-10-22 NOTE — PROGRESS NOTES
[facility]   part of [facility] Hospitalist Progress Note     Godwin Fonseca Patient Status:  Inpatient    1980 MRN [id number]   Location [facility] 2NE-A Attending Phong Freedman, DO   Hosp Day # 5 PCP Adrian Small MD     Follow Up:  The primary encounter diagnosis was Pneumonia of right lower lobe due to infectious organism. Diagnoses of Hypertensive emergency, ESRD (end stage renal disease) on dialysis (HCC), Hyperkalemia, and Neck pain were also pertinent to this visit.    Subjective:     Patient seen and examined.  Feeling better, denies SOB.  Neck pain controlled on oxy IR.  No F/C, N/V.    Objective:    Review of Systems:   10 point ROS completed and was negative, except for pertinent positive and negatives stated in subjective.    Vital signs:  Temp:  [97.1 °F (36.2 °C)-98.4 °F (36.9 °C)] 98.4 °F (36.9 °C)  Pulse:  [] 76  Resp:  [16-18] 18  BP: (123-146)/() 136/99  SpO2:  [93 %-100 %] 95 %    Physical Exam:    Gen: No acute distress, alert and oriented x3, no focal neurologic deficits.  Chronically ill appearing male.    HEENT:  EOMI, PERRLA, OP clear, MMM  Pulm:  + improving R basilar crackles, normal respiratory effort  CV: Heart with regular rate and rhythm, no murmur.  Normal PMI.    Abd: Abdomen soft, nontender, nondistended, no organomegaly, bowel sounds present  MSK: Full range of motion in extremities, no clubbing, no cyanosis  Skin: no rashes or lesions  Neuro:  Grossly intact, no focal deficits  Lines: [facility] CVC  LUE AVF         Diagnostic Data:    Labs:  Recent Labs   Lab 10/17/24  0744 10/18/24  0554   WBC 7.6 6.5   HGB 8.7* 10.3*   .2* 96.3   .0 197.0       Recent Labs   Lab 10/17/24  0744 10/18/24  0554 10/20/24  0611 10/21/24  0544 10/22/24  0632   *   < > 133* 91 90   BUN 75*   < > 52* 51* 33*   CREATSERUM 10.70*   < > 7.74* 8.92* 6.07*   CA 7.8*   < > 9.0 9.3 9.6   ALB 3.4   < > 4.1 4.0 4.8      < > 137  136 134*   K 5.7*   < > 4.8 5.0 4.6      < > 103 103 98   CO2 20.0*   < > 23.0 19.0* 26.0   ALKPHO 70  --   --   --   --    AST 33  --   --   --   --    ALT 10*  --   --   --   --    BILT 0.4  --   --   --   --    TP 7.0  --   --   --   --     < > = values in this interval not displayed.       Estimated Creatinine Clearance: 17.7 mL/min (A) (based on SCr of 6.07 mg/dL (H)).    No results for input(s): \"PTP\", \"INR\" in the last 168 hours.         COVID-19 Lab Results    COVID-19  Lab Results   Component Value Date    COVID19 Not Detected 10/17/2024    COVID19 Not Detected 09/28/2024    COVID19 Not Detected 09/15/2024       Pro-Calcitonin  No results for input(s): \"PCT\" in the last 168 hours.    Cardiac  No results for input(s): \"TROP\", \"PBNP\" in the last 168 hours.    Creatinine Kinase  No results for input(s): \"CK\" in the last 168 hours.    Inflammatory Markers  No results for input(s): \"CRP\", \"HARJEET\", \"LDH\", \"DDIMER\" in the last 168 hours.    Imaging: Imaging data reviewed in Epic.    Medications:    Lisdexamfetamine Dimesylate  60 mg Oral Daily    hydrALAZINE  50 mg Oral Q8H MARTHA    sevelamer carbonate  1,600 mg Oral TID CC    ARIPiprazole  15 mg Oral Daily    buPROPion ER  150 mg Oral Daily    fenofibrate micronized  134 mg Oral Nightly    FLUoxetine HCl  40 mg Oral Daily    fluticasone propionate  1 spray Each Nare Daily    isosorbide mononitrate ER  30 mg Oral Daily    lamoTRIgine  150 mg Oral Daily    losartan  100 mg Oral Daily    memantine  5 mg Oral BID    NIFEdipine ER  60 mg Oral BID    tamsulosin  0.4 mg Oral Daily    umeclidinium bromide  1 puff Inhalation Daily    piperacillin-tazobactam  3.375 g Intravenous Q12H    heparin  5,000 Units Subcutaneous Q8H MARTHA    carvedilol  25 mg Oral BID       Assessment & Plan:      44 yr old male with PMH sig for  ESRD on HD (M/W/F), bipolar, depression, DM 2, HTN, DL, CAD, COPD, HFpEF, and chronic abdominal pain who presented to the ED for evaluation of cough and  SOB.      # Hypertensive emergency   - s/p NGT gtt  - HD today per renal  - restart home BP meds coreg 25 mg BID, imdur 30 mg/d, losartan 100 mg/d, nifedipine XL 60 mg/d.  Hydralazine increased to TID.  - clonidine not restarted   - PRN IV labetalol and hydralazine      # Suspected RLL pneumonia   - however changes on CXR could also be due to fluid overload now better s/p HD  - cont empiric zosyn for cover for HCAP, pt can resume po levaquin on discharge   - expanded flu panel neg   - f/u Bcx's (NGTD)     # Chronic neck pain with cervical radiculopathy   - pt has failed steroid injections, was to see spine surgeon at [facility] but needed cardiac clearance.    - repeat MRI c-spine showed mild DJD.  Results discussed with pt, recommend outpt f/u with spine surgeon, no need for inpatient spine consult.     # ESRD on HD  - cont HD per renal  - renal c/s appreciated      # Chronic diastolic HF  - volume control with HD per renal      # Hx of GI bleed  - seen by GI last admit  - f/u with GI for capsule study  - hemorrhoid surgery when possible      # Chronic abd pain  # Chronic back pain  # Opioid dependence   - follows with pain clinic as outpt   - changed norco to PRN oxy IR.  Added flexeril PRN.  - wean off IV narcotics when possible   - repeat MRI c-spine showed mild DJD   - pt to f/u with spine surgeon      # HLD  - cont statin     # COPD  - no evidence of acute exacerbation   - resume home inhalers      # Major depression, recurrent   # Bipolar d/o  - stable  - resume home medications      # SARAH  - PCP is having difficult time getting pt in to see pulm as outpt for SARAH tx.  This could be causing sleepiness and him to miss HD sessions.   - message sent to pulm about setting up pulm outpt f/u appt for sleep study and CPAP.    Plan of care: inpt care.  POC d/w pt who agrees.     Plan of care discussed with patient or family at bedside.    Phong Freedman, DO    Supplementary Documentation:     Quality:  DVT  Prophylaxis: hep subcutaneous   CODE status: FULL  Abbott: no  Central line: yes  If COVID testing is negative, may discontinue isolation: yes     Estimated date of discharge: tomorrow   Discharge is dependent on: clinical course   At this point Mr. Fonseca is expected to be discharge to: home    Plan of care discussed with pt

## 2024-10-22 NOTE — PROGRESS NOTES
NURSING DISCHARGE NOTE    Discharged Home via Wheelchair.  Accompanied by RN  Belongings Taken by patient/family.    All upcoming appointments reviewed with and understood by patient. All medications reviewed with and understood by patient. Home medication given back to patient. All questions answered at this time. Patient left unit with no signs of distress.

## 2024-10-22 NOTE — PLAN OF CARE
Assumed care of pt @2330. Pt axox4. Completed dialysis @0030.3.2L out. Pt tolerated well. Dilaudid given for neck pain see mar. Pt refuses to take oxycodone po. Hot packs applied to neck for comfort. Pt resting comfortably at this time. Call light within reach. Bed alarm on.

## 2024-10-22 NOTE — PLAN OF CARE
Patient alert and oriented x 4. On RA. Up with SBA. NSR on tele. Continent of bowel/bladder. No complaints of shortness of breath, chest pain/discomfort. Complains of neck pain; prn meds given. POC: discharge today. Call light within reach. Fall precautions in place.     Problem: CARDIOVASCULAR - ADULT  Goal: Maintains optimal cardiac output and hemodynamic stability  Description: INTERVENTIONS:  - Monitor vital signs, rhythm, and trends  - Monitor for bleeding, hypotension and signs of decreased cardiac output  - Evaluate effectiveness of vasoactive medications to optimize hemodynamic stability  - Monitor arterial and/or venous puncture sites for bleeding and/or hematoma  - Assess quality of pulses, skin color and temperature  - Assess for signs of decreased coronary artery perfusion - ex. Angina  - Evaluate fluid balance, assess for edema, trend weights  Outcome: Progressing  Goal: Absence of cardiac arrhythmias or at baseline  Description: INTERVENTIONS:  - Continuous cardiac monitoring, monitor vital signs, obtain 12 lead EKG if indicated  - Evaluate effectiveness of antiarrhythmic and heart rate control medications as ordered  - Initiate emergency measures for life threatening arrhythmias  - Monitor electrolytes and administer replacement therapy as ordered  Outcome: Progressing     Problem: PAIN - ADULT  Goal: Verbalizes/displays adequate comfort level or patient's stated pain goal  Description: INTERVENTIONS:  - Encourage pt to monitor pain and request assistance  - Assess pain using appropriate pain scale  - Administer analgesics based on type and severity of pain and evaluate response  - Implement non-pharmacological measures as appropriate and evaluate response  - Consider cultural and social influences on pain and pain management  - Manage/alleviate anxiety  - Utilize distraction and/or relaxation techniques  - Monitor for opioid side effects  - Notify MD/LIP if interventions unsuccessful or patient  reports new pain  - Anticipate increased pain with activity and pre-medicate as appropriate  Outcome: Progressing     Problem: Patient/Family Goals  Goal: Patient/Family Long Term Goal  Description: Patient's Long Term Goal: discharge home    Interventions:  - Hospitalist/Renal, labs, STAT dialysis, scheduled bp meds, prn pain meds, IV abx, daily weight, expanded flu panel, nitroglycerin gtt  - See additional Care Plan goals for specific interventions  Outcome: Progressing  Goal: Patient/Family Short Term Goal  Description: Patient's Short Term Goal: control blood pressure    Interventions:   - scheduled bp meds, prn pain meds, STAT dialysis  - See additional Care Plan goals for specific interventions  Outcome: Progressing     Problem: HEMATOLOGIC - ADULT  Goal: Free from bleeding injury  Description: (Example usage: patient with low platelets)  INTERVENTIONS:  - Avoid intramuscular injections, enemas and rectal medication administration  - Ensure safe mobilization of patient  - Hold pressure on venipuncture sites to achieve adequate hemostasis  - Assess for signs and symptoms of internal bleeding  - Monitor lab trends  - Patient is to report abnormal signs of bleeding to staff  - Avoid use of toothpicks and dental floss  - Use electric shaver for shaving  - Use soft bristle tooth brush  - Limit straining and forceful nose blowing  Outcome: Progressing

## 2024-10-22 NOTE — BH PROGRESS NOTE
[facility] SOAP Note    Godwin Fonseca Patient Status:  Inpatient    1980 MRN [id number]   [facility] 2NE-A Attending Phong Freedman, DO   Hosp Day # 5 PCP Adrian Small MD       Writer met with patient at bedside for PHQ-for follow-up.  Patient does report some increased depression but states that he has struggled with lower mood for a while.  Patient states he recently got connected with an outpatient therapist and has an appointment scheduled for .  Patient states that he will begin services with therapist and see how he feels.      Patient's PHQ-4 score equals 6.  Patient was alert and oriented x 4.  Patient was calm and cooperative.  Patient's thought process was linear and intact.  Patient's speech was appropriate tone and volume.  Patient did not display any symptoms of reymundo or hypomania.  No delusional thinking noted.  Patient participated in meeting with writer.      Patient admits to some depressive symptoms and identifies that this is something that he has been feeling for a while.  Patient aware of how he has been feeling and actively seeking treatment.  Patient appears to be appropriately pursuing treatment.  Denies any SI or HI.      Patient already has an outpatient therapist established and denies any need for additional resources.  Patient has upcoming appointment with therapist.  Plan of action based on the above, no additional follow-up from psych liaison needed at this time.      Kita CORNELL LCSW  10/22/2024  8:43 AM

## 2024-10-23 NOTE — PAYOR COMM NOTE
--------------  DISCHARGE REVIEW    Payor: [employer]  Subscriber #:  [id number]  Authorization Number: [id number]    Admit date: 10/17/24  Admit time:  11:26 AM  Discharge Date: 10/22/2024  1:02 PM     Admitting Physician: Laila Ryder MD  Attending Physician:  No att. providers found  Primary Care Physician: Adrian Arce MD          Discharge Summary Notes        Discharge Summary signed by Phong Freedman DO at 10/22/2024 10:17 AM       Author: Phong Freedman DO Specialty: Internal Medicine Author Type: Physician    Filed: 10/22/2024 10:17 AM Date of Service: 10/22/2024 10:16 AM Status: Signed    : Phong Freedman DO (Physician)                                                          [facility] Internal Medicine Hospitalist Discharge Summary     Patient ID:  Godwin Fonseca  44 year old  4/12/1980    Admit date: 10/17/2024    Discharge date and time: 10/22/2024    Attending Physician: Phong Freedman DO     Primary Care Physician: Adrian Small MD     Discharge Diagnoses:   Hypertensive urgency   Possible RLL PNA  Chronic neck pain  ESRD on HD  Chronic diastolic HF  Chronic abd pain  Chronic back pain  Opioid dependence   HLD  COPD  Depression  Bipolar d/o    Please note that only IHP DMG and EMG patients enrolled in the Medicare ACO, [facility] ACO and [facility] HMOs will be handled by the [facility] Care Management team.  For all other patients, please follow usual protocol for discharge care transition.    Discharge Condition: stable    Disposition:  Home    Important Follow up:  - PCP: Dr. Small 10/24/24 at 2:30 pm  - Consults: Renal     Follow Up Items:  None    Hospital Course:      44 yr old male with PMH sig for  ESRD on HD (M/W/F), bipolar, depression, DM 2, HTN, DL, CAD, COPD, HFpEF, and chronic abdominal pain who presented to the ED for evaluation of cough and SOB.      # Hypertensive emergency   - s/p NGT gtt  - HD today per renal  - restart home BP meds coreg 25 mg  BID, imdur 30 mg/d, losartan 100 mg/d, nifedipine XL 60 mg/d.  Hydralazine increased to TID.  - clonidine not restarted   - PRN IV labetalol and hydralazine      # Suspected RLL pneumonia   - however changes on CXR could also be due to fluid overload now better s/p HD  - cont empiric zosyn for cover for HCAP, pt can resume po levaquin on discharge   - expanded flu panel neg   - f/u Bcx's (NGTD)     # Chronic neck pain with cervical radiculopathy   - pt has failed steroid injections, was to see spine surgeon at [facility] but needed cardiac clearance.    - repeat MRI c-spine showed mild DJD.  Results discussed with pt, recommend outpt f/u with spine surgeon, no need for inpatient spine consult.     # ESRD on HD  - cont HD per renal  - renal c/s appreciated      # Chronic diastolic HF  - volume control with HD per renal      # Hx of GI bleed  - seen by GI last admit  - f/u with GI for capsule study  - hemorrhoid surgery when possible      # Chronic abd pain  # Chronic back pain  # Opioid dependence   - follows with pain clinic as outpt   - changed norco to PRN oxy IR.  Added flexeril PRN.  - wean off IV narcotics when possible   - repeat MRI c-spine showed mild DJD   - pt to f/u with spine surgeon      # HLD  - cont statin     # COPD  - no evidence of acute exacerbation   - resume home inhalers      # Major depression, recurrent   # Bipolar d/o  - stable  - resume home medications     # SARAH  - PCP is having difficult time getting pt in to see pulm as outpt for SARAH tx.  This could be causing sleepiness and him to miss HD sessions.   - message sent to pulm about setting up pulm outpt f/u appt.    Stable for discharge home.     Consults: IP CONSULT TO NEPHROLOGY  IP CONSULT TO HOSPITALIST  IP CONSULT TO NEPHROLOGY  IP CONSULT TO HOSPITALIST  IP CONSULT TO SOCIAL WORK  IP CONSULT TO SPIRITUAL CARE    Operative Procedures:  None      Patient instructions:      I as the attending physician reconciled the current and  discharge medications on day of discharge.     Current Discharge Medication List        START taking these medications    Details   oxyCODONE 10 MG Oral Tab Take 1 tablet (10 mg total) by mouth every 4 (four) hours as needed for Pain.           CONTINUE these medications which have CHANGED    Details   hydrALAZINE 50 MG Oral Tab Take 1 tablet (50 mg total) by mouth every 8 (eight) hours.           CONTINUE these medications which have NOT CHANGED    Details   memantine 5 MG Oral Tab Take 1 tablet (5 mg total) by mouth 2 (two) times daily.      Lisdexamfetamine Dimesylate 70 MG Oral Cap Take 1 capsule (70 mg total) by mouth every morning.      levoFLOXacin 250 MG Oral Tab TAKE 2 TABLETs BY MOUTH one time then take 1 tablet every 48 hours for 30 days      albuterol 108 (90 Base) MCG/ACT Inhalation Aero Soln Inhale 2 puffs into the lungs every 6 (six) hours as needed for Wheezing.      tiotropium 18 MCG Inhalation Cap Inhale 1 capsule (18 mcg total) into the lungs daily.      tamsulosin 0.4 MG Oral Cap Take 1 capsule (0.4 mg total) by mouth daily.      ARIPiprazole 15 MG Oral Tab Take 1 tablet (15 mg total) by mouth daily.      isosorbide mononitrate ER 30 MG Oral Tablet 24 Hr Take 1 tablet (30 mg total) by mouth daily. Hold if systolic blood pressure <130      buPROPion  MG Oral Tablet 24 Hr Take 1 tablet (150 mg total) by mouth daily.      lamoTRIgine (LAMICTAL) 150 MG Oral Tab Take 1 tablet (150 mg total) by mouth daily.      FLUoxetine HCl 40 MG Oral Cap Take 1 capsule (40 mg total) by mouth daily.      RENVELA 800 MG Oral Tab Take 1 tablet (800 mg total) by mouth 3 (three) times daily with meals.      losartan 100 MG Oral Tab Take 1 tablet (100 mg total) by mouth daily. Hold if systolic blood pressure <130      NIFEdipine ER 60 MG Oral Tablet 24 Hr Take 1 tablet (60 mg total) by mouth in the morning and 1 tablet (60 mg total) before bedtime. Hold if systolic blood pressure <130.      carvedilol 25 MG Oral  Tab Take 1 tablet (25 mg total) by mouth in the morning and 1 tablet (25 mg total) in the evening. Take with meals.      Fenofibrate 134 MG Oral Cap Take 1 capsule by mouth nightly.      Fluticasone Propionate 50 MCG/ACT Nasal Suspension SPRAY ONCE INTO EACH NOSTRIL BID PRN           STOP taking these medications       HYDROcodone-acetaminophen (NORCO)  MG Oral Tab        cloNIDine 0.1 MG Oral Tab        HYDROcodone-acetaminophen 5-325 MG Oral Tab        Vancomycin HCl in NaCl 1-0.9 GM/250ML-% Intravenous Solution        ceFAZolin in sodium chloride 0.9% 3 g/100mL Intravenous Solution        ceFAZolin Sodium 2 g Intravenous Recon Soln        ceFAZolin Sodium 2 g Intravenous Recon Soln              Activity: activity as tolerated  Diet: renal diet  Wound Care: as directed  Code Status: Full Code      Exam on day of discharge:     Vitals:    10/22/24 0735   BP: (!) 136/99   Pulse: 76   Resp: 18   Temp: 98.4 °F (36.9 °C)       Gen: No acute distress, alert and oriented x3, no focal neurologic deficits.  Chronically ill appearing male.    HEENT:  EOMI, PERRLA, OP clear, MMM  Pulm:  + improving R basilar crackles, normal respiratory effort  CV: Heart with regular rate and rhythm, no murmur.  Normal PMI.    Abd: Abdomen soft, nontender, nondistended, no organomegaly, bowel sounds present  MSK: Full range of motion in extremities, no clubbing, no cyanosis  Skin: no rashes or lesions  Neuro:  Grossly intact, no focal deficits  Lines: [facility] CVC  E AV         Total time coordinating care for discharge: Greater than 30 minutes    Phong Freedman DO  [facility] Hospitalist       Electronically signed by Phong Freedman DO on 10/22/2024 10:17 AM         REVIEWER COMMENTS

## 2024-10-24 ENCOUNTER — HOSPITAL ENCOUNTER (EMERGENCY)
Age: 44
Discharge: HOME | End: 2024-10-25
Attending: EMERGENCY MEDICINE
Payer: MEDICARE

## 2024-10-24 DIAGNOSIS — T82.848A PAIN FROM A/V FISTULA (HCC): Primary | ICD-10-CM

## 2024-10-24 PROCEDURE — 99284 EMERGENCY DEPT VISIT MOD MDM: CPT

## 2024-10-24 PROCEDURE — 36415 COLL VENOUS BLD VENIPUNCTURE: CPT

## 2024-10-24 PROCEDURE — 99285 EMERGENCY DEPT VISIT HI MDM: CPT

## 2024-10-25 ENCOUNTER — APPOINTMENT (OUTPATIENT)
Dept: ULTRASOUND IMAGING | Age: 44
End: 2024-10-25
Attending: EMERGENCY MEDICINE
Payer: MEDICARE

## 2024-10-25 VITALS
SYSTOLIC BLOOD PRESSURE: 180 MMHG | DIASTOLIC BLOOD PRESSURE: 116 MMHG | HEART RATE: 90 BPM | OXYGEN SATURATION: 98 % | WEIGHT: 240 LBS | TEMPERATURE: 98 F | BODY MASS INDEX: 30.8 KG/M2 | HEIGHT: 74 IN | RESPIRATION RATE: 18 BRPM

## 2024-10-25 LAB
ALBUMIN SERPL-MCNC: 3.7 G/DL (ref 3.4–5)
ALBUMIN/GLOB SERPL: 1 {RATIO} (ref 1–2)
ALP LIVER SERPL-CCNC: 63 U/L
ALT SERPL-CCNC: 13 U/L
ANION GAP SERPL CALC-SCNC: 11 MMOL/L (ref 0–18)
AST SERPL-CCNC: 15 U/L (ref 15–37)
BASOPHILS # BLD AUTO: 0.08 X10(3) UL (ref 0–0.2)
BASOPHILS NFR BLD AUTO: 1.6 %
BILIRUB SERPL-MCNC: 0.2 MG/DL (ref 0.1–2)
BUN BLD-MCNC: 72 MG/DL (ref 9–23)
CALCIUM BLD-MCNC: 8.6 MG/DL (ref 8.5–10.1)
CHLORIDE SERPL-SCNC: 101 MMOL/L (ref 98–112)
CO2 SERPL-SCNC: 24 MMOL/L (ref 21–32)
CREAT BLD-MCNC: 10.1 MG/DL
EGFRCR SERPLBLD CKD-EPI 2021: 6 ML/MIN/1.73M2 (ref 60–?)
EOSINOPHIL # BLD AUTO: 0.18 X10(3) UL (ref 0–0.7)
EOSINOPHIL NFR BLD AUTO: 3.6 %
ERYTHROCYTE [DISTWIDTH] IN BLOOD BY AUTOMATED COUNT: 13.8 %
GLOBULIN PLAS-MCNC: 3.8 G/DL (ref 2.8–4.4)
GLUCOSE BLD-MCNC: 90 MG/DL (ref 70–99)
HCT VFR BLD AUTO: 29.3 %
HGB BLD-MCNC: 10 G/DL
IMM GRANULOCYTES # BLD AUTO: 0.01 X10(3) UL (ref 0–1)
IMM GRANULOCYTES NFR BLD: 0.2 %
LYMPHOCYTES # BLD AUTO: 1.15 X10(3) UL (ref 1–4)
LYMPHOCYTES NFR BLD AUTO: 23.2 %
MCH RBC QN AUTO: 33.3 PG (ref 26–34)
MCHC RBC AUTO-ENTMCNC: 34.1 G/DL (ref 31–37)
MCV RBC AUTO: 97.7 FL
MONOCYTES # BLD AUTO: 0.93 X10(3) UL (ref 0.1–1)
MONOCYTES NFR BLD AUTO: 18.8 %
NEUTROPHILS # BLD AUTO: 2.61 X10 (3) UL (ref 1.5–7.7)
NEUTROPHILS # BLD AUTO: 2.61 X10(3) UL (ref 1.5–7.7)
NEUTROPHILS NFR BLD AUTO: 52.6 %
OSMOLALITY SERPL CALC.SUM OF ELEC: 303 MOSM/KG (ref 275–295)
PLATELET # BLD AUTO: 219 10(3)UL (ref 150–450)
POTASSIUM SERPL-SCNC: 5.3 MMOL/L (ref 3.5–5.1)
PROT SERPL-MCNC: 7.5 G/DL (ref 6.4–8.2)
RBC # BLD AUTO: 3 X10(6)UL
SODIUM SERPL-SCNC: 136 MMOL/L (ref 136–145)
WBC # BLD AUTO: 5 X10(3) UL (ref 4–11)

## 2024-10-25 PROCEDURE — 80053 COMPREHEN METABOLIC PANEL: CPT | Performed by: EMERGENCY MEDICINE

## 2024-10-25 PROCEDURE — 85025 COMPLETE CBC W/AUTO DIFF WBC: CPT | Performed by: EMERGENCY MEDICINE

## 2024-10-25 PROCEDURE — 93990 DOPPLER FLOW TESTING: CPT | Performed by: EMERGENCY MEDICINE

## 2024-10-25 RX ORDER — HYDROCODONE BITARTRATE AND ACETAMINOPHEN 5; 325 MG/1; MG/1
1-2 TABLET ORAL EVERY 6 HOURS PRN
Qty: 10 TABLET | Refills: 0 | Status: SHIPPED | OUTPATIENT
Start: 2024-10-25 | End: 2024-10-30

## 2024-10-25 RX ORDER — HYDROCODONE BITARTRATE AND ACETAMINOPHEN 5; 325 MG/1; MG/1
2 TABLET ORAL ONCE
Status: COMPLETED | OUTPATIENT
Start: 2024-10-25 | End: 2024-10-25

## 2024-10-25 NOTE — ED INITIAL ASSESSMENT (HPI)
PT to the ED for evaluation of rectal bleeding and pain to the L arm and numbness to the fingers. PT reports he had a procedure in his L arm today to cannulate for a fistula. Last dialysis Wednesday.

## 2024-10-25 NOTE — ED PROVIDER NOTES
Patient Seen in: [facility] In [place]      History     Chief Complaint   Patient presents with    Pain     Stated Complaint: angiogram on left forearm today co of finger numbness, denies taking pain medic*    Subjective:   HPI      44-year-old male presents for evaluation of numbness and tingling to his left forearm and all 5 digits of his left hand since AV fistula cannulation by Dr. Marx at [facility] today at around noon.  He is still using his permacath for dialysis.  However Dr. Marx stated he could use the AV fistula after the surgery as soon as tomorrow.  Patient also complains of rectal bleeding.  He has been evaluated recently by GI for same symptoms felt to be due to hemorrhoidal bleeding.  He had extensive workup including EGD, colonoscopy and capsule endoscopy.  These were otherwise negative.    Objective:     Past Medical History:    Anxiety state    Arrhythmia    Asthma (Formerly [facility])    Attention deficit hyperactivity disorder (ADHD)    Back problem    Bipolar 1 disorder (Formerly [facility])    CKD (chronic kidney disease) stage 3, GFR 30-59 ml/min (Formerly [facility])    Dr Meeks    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[facility]ly [facility])    Congestive heart disease (Formerly [facility])    COPD (chronic obstructive pulmonary disease) (Formerly [facility])    Coronary atherosclerosis    Deep vein thrombosis ([facility])    at age 17 R/T cast    Depression    Diabetes ([facility]ly [facility])    Dialysis patient (Formerly [facility])    Diverticulosis of large intestine    Essential hypertension    3/21 echo: severe concentric LVH with normal EF and no MR or pHTN    Extrinsic asthma, unspecified    Heart attack ([facility])    2016- angiogram- no intervention    Heart valve disease    mitral valve repair in 1994/    High blood pressure    High cholesterol    History of blood transfusion    History of mitral valve repair    Hyperlipidemia    Low back pain    tight and stiff after sweeping and mopping    LVH (left ventricular hypertrophy)    Migraines    Mixed hyperlipidemia     HDL 38 LDL 97 VLDL 57 TG  287    Monoclonal gammopathy    IgG kappa     Muscle weakness    MVP (mitral valve prolapse)    Repair  at [facility]; echoes as recently as 3/21 show mild or trivial MR and no stenosis    Neuropathy    Osteoarthritis    hip ,knees    Pneumonia due to organism    Pulmonary embolism (HCC)    Renal disorder    Stroke (HCC)    TIA (transient ischemic attack)    Initial history of left-sided weakness and slurred speech. (+) cocaine. MRI of the brain, CT angiogram of the head and neck, and 2D echo are all unremarkable.     TMJ (dislocation of temporomandibular joint)    Troponin level elevated    Trop 60 60 47 with TIA and no CP: Lexiscan negative with EF 51    Visual impairment              Past Surgical History:   Procedure Laterality Date    Av fistula revision, open Left     Cabg      Colonoscopy N/A 2023    Procedure: COLONOSCOPY;  Surgeon: Heath Vu MD;  Location:  ENDOSCOPY    Colonoscopy N/A 2023    Procedure: COLONOSCOPY with cold snare polypectomy and forcep polypectomy;  Surgeon: Ousmane Suarez MD;  Location:  ENDOSCOPY    Colonoscopy & polypectomy      [facility]  2019    Duodenitis. Biopsied. EUS for weight loss was negative    Heart surgery      Hernia surgery  2022    Dr Barnes    Laminectomy,>2 sgmt,lumbar  2018    L4-L5 Decomp Discectomy RO[facility] L4-L5    Mitralplasty w cp bypass      [facility]: Repair    Repair rotator cuff,chronic Left     torn and had a ruptured bicep    Sinus surgery        Spine surgery procedure unlisted      Valve repair      mitral valve                Social History     Socioeconomic History    Marital status:    Tobacco Use    Smoking status: Former     Current packs/day: 0.00     Average packs/day: 1 pack/day for 27.0 years (27.0 ttl pk-yrs)     Types: Cigarettes     Start date: 1995     Quit date: 2022     Years since quittin.6     Passive exposure: Never    Smokeless tobacco: Never   Vaping Use    Vaping status: Never  Used   Substance and Sexual Activity    Alcohol use: No    Drug use: No     Social Drivers of Health     Financial Resource Strain: Medium Risk (5/7/2024)    Received from [facility]    Overall Financial Resource Strain (CARDIA)     Difficulty of Paying Living Expenses: Somewhat hard   Food Insecurity: No Food Insecurity (10/17/2024)    Food Insecurity     Food Insecurity: Never true   Transportation Needs: No Transportation Needs (10/17/2024)    Transportation Needs     Lack of Transportation: No   Physical Activity: Inactive (5/7/2024)    Received from [facility]    Exercise Vital Sign     Days of Exercise per Week: 0 days     Minutes of Exercise per Session: 0 min   Stress: Stress Concern Present (5/7/2024)    Received from [facility]    [employer] [place] [employer] - Occupational Stress Questionnaire     Feeling of Stress : Rather much   Social Connections: Unknown (5/7/2024)    Received from [facility]    Social Connection and Isolation Panel [NHANES]     Frequency of Communication with Friends and Family: More than three times a week     Frequency of Social Gatherings with Friends and Family: More than three times a week     Attends [employer] Services: Never     Active Member of Clubs or Organizations: No     Attends Club or Organization Meetings: Never     Marital Status: Patient unable to answer   Housing Stability: Low Risk  (10/17/2024)    Housing Stability     Housing Instability: No                  Physical Exam     ED Triage Vitals [10/24/24 2355]   BP (!) 197/116   Pulse 92   Resp 20   Temp 98.1 °F (36.7 °C)   Temp src Oral   SpO2 97 %   O2 Device None (Room air)       Current Vitals:   Vital Signs  BP: (!) 197/116  Pulse: 92  Resp: 20  Temp: 98.1 °F (36.7 °C)  Temp src: Oral    Oxygen Therapy  SpO2: 97 %  O2 Device: None (Room air)        Physical Exam     General: Alert, oriented, no apparent distress  HEENT:  Pupils equal reactive.  Extraocular  motions intact. MMM.  Neck: Supple  Lungs: Clear to auscultation bilaterally.  Heart: Regular rate and rhythm.  Palpable thrill to the left dorsal forearm overlying the AV fistula.  Warm hand.  Cap refill less than 2 seconds all 5 digits.  Abdomen: Soft, nontender.   Skin: No rash.  No edema.  Neurologic: No focal neurologic deficits.  Normal speech pattern  Musculoskeletal: No tenderness or deformity noted.  Full range of motion throughout.      ED Course     Labs Reviewed   COMP METABOLIC PANEL (14) - Abnormal; Notable for the following components:       Result Value    Potassium 5.3 (*)     BUN 72 (*)     Creatinine 10.10 (*)     Calculated Osmolality 303 (*)     eGFR-Cr 6 (*)     ALT 13 (*)     All other components within normal limits   CBC WITH DIFFERENTIAL WITH PLATELET - Abnormal; Notable for the following components:    RBC 3.00 (*)     HGB 10.0 (*)     HCT 29.3 (*)     All other components within normal limits                   MDM      Differential diagnosis includes DVT, ischemic limb, metabolic disturbance, cellulitis    Hemoglobin is 10 which is baseline.    Arterial duplex:  Patent left radial/cephalic vein fistula.  Case discussed with Dr. Garcia.  No other imaging necessary.  10 tablets of Norco provided to patient.    Close follow-up advised for persistent symptoms.  If worsening pain, color change or other new symptoms return here    Medical Decision Making      Disposition and Plan     Clinical Impression:  1. Pain from A/V fistula (HCC)         Disposition:  Discharge  10/25/2024  1:50 am    Follow-up:  Jacky Marx  [facility] [address] [address]  [phone]    Call in 2 day(s)  For wound re-check          Medications Prescribed:  Current Discharge Medication List        START taking these medications    Details   HYDROcodone-acetaminophen 5-325 MG Oral Tab Take 1-2 tablets by mouth every 6 (six) hours as needed for Pain.  Qty: 10 tablet, Refills: 0     Associated Diagnoses: Pain from A/V fistula (HCC)                 Supplementary Documentation:

## 2024-11-06 ENCOUNTER — APPOINTMENT (OUTPATIENT)
Dept: GENERAL RADIOLOGY | Age: 44
End: 2024-11-06
Attending: EMERGENCY MEDICINE
Payer: MEDICARE

## 2024-11-06 ENCOUNTER — HOSPITAL ENCOUNTER (INPATIENT)
Facility: HOSPITAL | Age: 44
LOS: 3 days | Discharge: HOME | End: 2024-11-09
Attending: EMERGENCY MEDICINE | Admitting: HOSPITALIST
Payer: MEDICARE

## 2024-11-06 ENCOUNTER — APPOINTMENT (OUTPATIENT)
Dept: MRI IMAGING | Age: 44
End: 2024-11-06
Attending: EMERGENCY MEDICINE
Payer: MEDICARE

## 2024-11-06 ENCOUNTER — APPOINTMENT (OUTPATIENT)
Dept: CT IMAGING | Age: 44
End: 2024-11-06
Attending: EMERGENCY MEDICINE
Payer: MEDICARE

## 2024-11-06 DIAGNOSIS — N18.6 ESRD (END STAGE RENAL DISEASE) ON DIALYSIS (HCC): ICD-10-CM

## 2024-11-06 DIAGNOSIS — R79.89 ELEVATED TROPONIN: ICD-10-CM

## 2024-11-06 DIAGNOSIS — Z99.2 ESRD (END STAGE RENAL DISEASE) ON DIALYSIS (HCC): ICD-10-CM

## 2024-11-06 DIAGNOSIS — I10 UNCONTROLLED HYPERTENSION: ICD-10-CM

## 2024-11-06 DIAGNOSIS — R47.01 EXPRESSIVE APHASIA: Primary | ICD-10-CM

## 2024-11-06 LAB
ALBUMIN SERPL-MCNC: 3.5 G/DL (ref 3.4–5)
ALBUMIN/GLOB SERPL: 0.9 {RATIO} (ref 1–2)
ALP LIVER SERPL-CCNC: 85 U/L
ALT SERPL-CCNC: 21 U/L
ANION GAP SERPL CALC-SCNC: 10 MMOL/L (ref 0–18)
APTT PPP: 27.5 SECONDS (ref 23–36)
AST SERPL-CCNC: 27 U/L (ref 15–37)
ATRIAL RATE: 103 BPM
BASOPHILS # BLD AUTO: 0.07 X10(3) UL (ref 0–0.2)
BASOPHILS NFR BLD AUTO: 0.8 %
BILIRUB SERPL-MCNC: 0.5 MG/DL (ref 0.1–2)
BUN BLD-MCNC: 78 MG/DL (ref 9–23)
CALCIUM BLD-MCNC: 8.5 MG/DL (ref 8.5–10.1)
CHLORIDE SERPL-SCNC: 105 MMOL/L (ref 98–112)
CHOLEST SERPL-MCNC: 167 MG/DL (ref ?–200)
CO2 SERPL-SCNC: 22 MMOL/L (ref 21–32)
CREAT BLD-MCNC: 10.5 MG/DL
EGFRCR SERPLBLD CKD-EPI 2021: 6 ML/MIN/1.73M2 (ref 60–?)
EOSINOPHIL # BLD AUTO: 0.35 X10(3) UL (ref 0–0.7)
EOSINOPHIL NFR BLD AUTO: 3.9 %
ERYTHROCYTE [DISTWIDTH] IN BLOOD BY AUTOMATED COUNT: 13.9 %
GLOBULIN PLAS-MCNC: 3.7 G/DL (ref 2.8–4.4)
GLUCOSE BLD-MCNC: 122 MG/DL (ref 70–99)
GLUCOSE BLD-MCNC: 151 MG/DL (ref 70–99)
GLUCOSE BLD-MCNC: 172 MG/DL (ref 70–99)
HCT VFR BLD AUTO: 28.1 %
HDLC SERPL-MCNC: 42 MG/DL (ref 40–59)
HGB BLD-MCNC: 9.6 G/DL
IMM GRANULOCYTES # BLD AUTO: 0.03 X10(3) UL (ref 0–1)
IMM GRANULOCYTES NFR BLD: 0.3 %
INR BLD: 1 (ref 0.8–1.2)
LDLC SERPL CALC-MCNC: 95 MG/DL (ref ?–100)
LYMPHOCYTES # BLD AUTO: 1.23 X10(3) UL (ref 1–4)
LYMPHOCYTES NFR BLD AUTO: 13.7 %
MCH RBC QN AUTO: 33.1 PG (ref 26–34)
MCHC RBC AUTO-ENTMCNC: 34.2 G/DL (ref 31–37)
MCV RBC AUTO: 96.9 FL
MONOCYTES # BLD AUTO: 0.93 X10(3) UL (ref 0.1–1)
MONOCYTES NFR BLD AUTO: 10.4 %
NEUTROPHILS # BLD AUTO: 6.36 X10 (3) UL (ref 1.5–7.7)
NEUTROPHILS # BLD AUTO: 6.36 X10(3) UL (ref 1.5–7.7)
NEUTROPHILS NFR BLD AUTO: 70.9 %
NONHDLC SERPL-MCNC: 125 MG/DL (ref ?–130)
OSMOLALITY SERPL CALC.SUM OF ELEC: 310 MOSM/KG (ref 275–295)
P AXIS: 36 DEGREES
P-R INTERVAL: 196 MS
PLATELET # BLD AUTO: 235 10(3)UL (ref 150–450)
POTASSIUM SERPL-SCNC: 4.5 MMOL/L (ref 3.5–5.1)
PROT SERPL-MCNC: 7.2 G/DL (ref 6.4–8.2)
PROTHROMBIN TIME: 13 SECONDS (ref 11.6–14.8)
Q-T INTERVAL: 362 MS
QRS DURATION: 86 MS
QTC CALCULATION (BEZET): 474 MS
R AXIS: 20 DEGREES
RBC # BLD AUTO: 2.9 X10(6)UL
SODIUM SERPL-SCNC: 137 MMOL/L (ref 136–145)
T AXIS: 44 DEGREES
TRIGL SERPL-MCNC: 172 MG/DL (ref 30–149)
TROPONIN I SERPL HS-MCNC: 474 NG/L
VENTRICULAR RATE: 103 BPM
VLDLC SERPL CALC-MCNC: 28 MG/DL (ref 0–30)
WBC # BLD AUTO: 9 X10(3) UL (ref 4–11)

## 2024-11-06 PROCEDURE — 71045 X-RAY EXAM CHEST 1 VIEW: CPT | Performed by: EMERGENCY MEDICINE

## 2024-11-06 PROCEDURE — 70450 CT HEAD/BRAIN W/O DYE: CPT | Performed by: EMERGENCY MEDICINE

## 2024-11-06 PROCEDURE — 5A1D70Z PERFORMANCE OF URINARY FILTRATION, INTERMITTENT, LESS THAN 6 HOURS PER DAY: ICD-10-PCS | Performed by: INTERNAL MEDICINE

## 2024-11-06 PROCEDURE — 70551 MRI BRAIN STEM W/O DYE: CPT | Performed by: EMERGENCY MEDICINE

## 2024-11-06 RX ORDER — DIPHENHYDRAMINE HYDROCHLORIDE 50 MG/ML
12.5 INJECTION INTRAMUSCULAR; INTRAVENOUS ONCE
Status: COMPLETED | OUTPATIENT
Start: 2024-11-06 | End: 2024-11-06

## 2024-11-06 RX ORDER — HYDROCODONE BITARTRATE AND ACETAMINOPHEN 5; 325 MG/1; MG/1
2 TABLET ORAL EVERY 4 HOURS PRN
Status: DISCONTINUED | OUTPATIENT
Start: 2024-11-06 | End: 2024-11-06

## 2024-11-06 RX ORDER — BUPROPION HYDROCHLORIDE 150 MG/1
150 TABLET ORAL DAILY
Status: DISCONTINUED | OUTPATIENT
Start: 2024-11-06 | End: 2024-11-09

## 2024-11-06 RX ORDER — ISOSORBIDE MONONITRATE 30 MG/1
30 TABLET, EXTENDED RELEASE ORAL DAILY
Status: DISCONTINUED | OUTPATIENT
Start: 2024-11-06 | End: 2024-11-06

## 2024-11-06 RX ORDER — HYDRALAZINE HYDROCHLORIDE 50 MG/1
50 TABLET, FILM COATED ORAL EVERY 8 HOURS SCHEDULED
Status: DISCONTINUED | OUTPATIENT
Start: 2024-11-06 | End: 2024-11-09

## 2024-11-06 RX ORDER — PROCHLORPERAZINE EDISYLATE 5 MG/ML
5 INJECTION INTRAMUSCULAR; INTRAVENOUS EVERY 8 HOURS PRN
Status: DISCONTINUED | OUTPATIENT
Start: 2024-11-06 | End: 2024-11-09

## 2024-11-06 RX ORDER — HEPARIN SODIUM 1000 [USP'U]/ML
1.5 INJECTION, SOLUTION INTRAVENOUS; SUBCUTANEOUS
Status: ACTIVE | OUTPATIENT
Start: 2024-11-06 | End: 2024-11-08

## 2024-11-06 RX ORDER — HEPARIN SODIUM 5000 [USP'U]/ML
5000 INJECTION, SOLUTION INTRAVENOUS; SUBCUTANEOUS EVERY 8 HOURS SCHEDULED
Status: DISCONTINUED | OUTPATIENT
Start: 2024-11-06 | End: 2024-11-09

## 2024-11-06 RX ORDER — ALBUTEROL SULFATE 90 UG/1
2 INHALANT RESPIRATORY (INHALATION) EVERY 6 HOURS PRN
Status: DISCONTINUED | OUTPATIENT
Start: 2024-11-06 | End: 2024-11-09

## 2024-11-06 RX ORDER — TAMSULOSIN HYDROCHLORIDE 0.4 MG/1
0.4 CAPSULE ORAL DAILY
Status: DISCONTINUED | OUTPATIENT
Start: 2024-11-06 | End: 2024-11-09

## 2024-11-06 RX ORDER — HYDROMORPHONE HYDROCHLORIDE 1 MG/ML
0.8 INJECTION, SOLUTION INTRAMUSCULAR; INTRAVENOUS; SUBCUTANEOUS EVERY 2 HOUR PRN
Status: DISCONTINUED | OUTPATIENT
Start: 2024-11-06 | End: 2024-11-09

## 2024-11-06 RX ORDER — HYDROMORPHONE HYDROCHLORIDE 1 MG/ML
1 INJECTION, SOLUTION INTRAMUSCULAR; INTRAVENOUS; SUBCUTANEOUS ONCE
Status: COMPLETED | OUTPATIENT
Start: 2024-11-06 | End: 2024-11-06

## 2024-11-06 RX ORDER — ACETAMINOPHEN 325 MG/1
650 TABLET ORAL EVERY 4 HOURS PRN
Status: DISCONTINUED | OUTPATIENT
Start: 2024-11-06 | End: 2024-11-09

## 2024-11-06 RX ORDER — POLYETHYLENE GLYCOL 3350 17 G/17G
17 POWDER, FOR SOLUTION ORAL DAILY PRN
Status: DISCONTINUED | OUTPATIENT
Start: 2024-11-06 | End: 2024-11-09

## 2024-11-06 RX ORDER — CARVEDILOL 12.5 MG/1
25 TABLET ORAL 2 TIMES DAILY WITH MEALS
Status: DISCONTINUED | OUTPATIENT
Start: 2024-11-06 | End: 2024-11-09

## 2024-11-06 RX ORDER — HYDROMORPHONE HYDROCHLORIDE 1 MG/ML
0.4 INJECTION, SOLUTION INTRAMUSCULAR; INTRAVENOUS; SUBCUTANEOUS EVERY 2 HOUR PRN
Status: DISCONTINUED | OUTPATIENT
Start: 2024-11-06 | End: 2024-11-09

## 2024-11-06 RX ORDER — NIFEDIPINE 60 MG/1
60 TABLET, EXTENDED RELEASE ORAL 2 TIMES DAILY
Status: DISCONTINUED | OUTPATIENT
Start: 2024-11-06 | End: 2024-11-09

## 2024-11-06 RX ORDER — HYDROMORPHONE HYDROCHLORIDE 1 MG/ML
0.2 INJECTION, SOLUTION INTRAMUSCULAR; INTRAVENOUS; SUBCUTANEOUS EVERY 2 HOUR PRN
Status: DISCONTINUED | OUTPATIENT
Start: 2024-11-06 | End: 2024-11-09

## 2024-11-06 RX ORDER — TRAMADOL HYDROCHLORIDE 50 MG/1
50 TABLET ORAL EVERY 4 HOURS PRN
Status: DISCONTINUED | OUTPATIENT
Start: 2024-11-06 | End: 2024-11-07

## 2024-11-06 RX ORDER — FLUTICASONE PROPIONATE 50 MCG
2 SPRAY, SUSPENSION (ML) NASAL DAILY
Status: DISCONTINUED | OUTPATIENT
Start: 2024-11-06 | End: 2024-11-09

## 2024-11-06 RX ORDER — LABETALOL HYDROCHLORIDE 5 MG/ML
10 INJECTION, SOLUTION INTRAVENOUS EVERY 4 HOURS PRN
Status: DISCONTINUED | OUTPATIENT
Start: 2024-11-06 | End: 2024-11-09

## 2024-11-06 RX ORDER — SENNOSIDES 8.6 MG
17.2 TABLET ORAL NIGHTLY PRN
Status: DISCONTINUED | OUTPATIENT
Start: 2024-11-06 | End: 2024-11-09

## 2024-11-06 RX ORDER — ONDANSETRON 2 MG/ML
4 INJECTION INTRAMUSCULAR; INTRAVENOUS EVERY 6 HOURS PRN
Status: DISCONTINUED | OUTPATIENT
Start: 2024-11-06 | End: 2024-11-09

## 2024-11-06 RX ORDER — LABETALOL HYDROCHLORIDE 5 MG/ML
20 INJECTION, SOLUTION INTRAVENOUS ONCE
Status: COMPLETED | OUTPATIENT
Start: 2024-11-06 | End: 2024-11-06

## 2024-11-06 RX ORDER — ALBUMIN (HUMAN) 12.5 G/50ML
25 SOLUTION INTRAVENOUS
Status: DISCONTINUED | OUTPATIENT
Start: 2024-11-06 | End: 2024-11-07

## 2024-11-06 RX ORDER — ASPIRIN 81 MG/1
324 TABLET, CHEWABLE ORAL ONCE
Status: COMPLETED | OUTPATIENT
Start: 2024-11-06 | End: 2024-11-06

## 2024-11-06 RX ORDER — SEVELAMER CARBONATE 800 MG/1
800 TABLET, FILM COATED ORAL
Status: DISCONTINUED | OUTPATIENT
Start: 2024-11-06 | End: 2024-11-07

## 2024-11-06 RX ORDER — BISACODYL 10 MG
10 SUPPOSITORY, RECTAL RECTAL
Status: DISCONTINUED | OUTPATIENT
Start: 2024-11-06 | End: 2024-11-09

## 2024-11-06 RX ORDER — MEMANTINE HYDROCHLORIDE 5 MG/1
5 TABLET ORAL 2 TIMES DAILY
Status: DISCONTINUED | OUTPATIENT
Start: 2024-11-06 | End: 2024-11-09

## 2024-11-06 RX ORDER — HYDRALAZINE HYDROCHLORIDE 20 MG/ML
10 INJECTION INTRAMUSCULAR; INTRAVENOUS ONCE
Status: COMPLETED | OUTPATIENT
Start: 2024-11-06 | End: 2024-11-06

## 2024-11-06 RX ORDER — HYDROCODONE BITARTRATE AND ACETAMINOPHEN 5; 325 MG/1; MG/1
1 TABLET ORAL EVERY 4 HOURS PRN
Status: DISCONTINUED | OUTPATIENT
Start: 2024-11-06 | End: 2024-11-06

## 2024-11-06 NOTE — H&P
[facility]      Chief Complaint   Patient presents with    Stroke        PCP: Adrian Small MD      History of Present Illness: Patient is a 44 year old male with PMH sig for TIA, ESRD on HD, HFpEF, HTN, T2DM, CAD, COPD, PE who presents for speech changes.  He he was at his neurology appointment today and was noted to be slurring his speech and was recommended to go to the ED for further evaluation.  States he has been having speaking and trouble finding his words past few days.  Upon examination he still having trouble speaking.  Also reports he has been having memory issues.  He also complains of neck pain.  He had a CT head and MRI brain done at [facility], which were negative.    Past Medical History:    Anxiety state    Arrhythmia    Asthma ([facility])    Attention deficit hyperactivity disorder (ADHD)    Back problem    Bipolar 1 disorder ([facility])    CKD (chronic kidney disease) stage 3, GFR 30-59 ml/min ([facility])    Dr Meeks    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[facility])    Congestive heart disease ([facility])    COPD (chronic obstructive pulmonary disease) ([facility])    Coronary atherosclerosis    Deep vein thrombosis ([facility])    at age 17 R/T cast    Depression    Diabetes ([facility])    Dialysis patient ([facility])    Diverticulosis of large intestine    Essential hypertension    3/21 echo: severe concentric LVH with normal EF and no MR or pHTN    Extrinsic asthma, unspecified    Heart attack ([facility])    2016- angiogram- no intervention    Heart valve disease    mitral valve repair in 1994/    High blood pressure    High cholesterol    History of blood transfusion    History of mitral valve repair    Hyperlipidemia    Low back pain    tight and stiff after sweeping and mopping    LVH (left ventricular hypertrophy)    Migraines    Mixed hyperlipidemia     HDL 38 LDL 97 VLDL 57     Monoclonal gammopathy    IgG kappa     Muscle weakness    MVP (mitral valve prolapse)    Repair 1994 at [place]; echoes  as recently as 3/21 show mild or trivial MR and no stenosis    Neuropathy    Osteoarthritis    hip ,knees    Pneumonia due to organism    Pulmonary embolism (HCC)    Renal disorder    Stroke (HCC)    TIA (transient ischemic attack)    Initial history of left-sided weakness and slurred speech. (+) cocaine. MRI of the brain, CT angiogram of the head and neck, and 2D echo are all unremarkable.     TMJ (dislocation of temporomandibular joint)    Troponin level elevated    Trop 60 60 47 with TIA and no CP: Lexiscan negative with EF 51    Visual impairment      Past Surgical History:   Procedure Laterality Date    Av fistula revision, open Left     Cabg      Colonoscopy N/A 2023    Procedure: COLONOSCOPY;  Surgeon: Heath Vu MD;  Location:  ENDOSCOPY    Colonoscopy N/A 2023    Procedure: COLONOSCOPY with cold snare polypectomy and forcep polypectomy;  Surgeon: Ousmane Suarez MD;  Location:  ENDOSCOPY    Colonoscopy & polypectomy      E[facility]  2019    Duodenitis. Biopsied. EUS for weight loss was negative    Heart surgery      Hernia surgery  2022    Dr Barnes    Laminectomy,>2 sgmt,lumbar  2018    L4-L5 Decomp Discectomy ROEM L4-L5    Mitralplasty w cp bypass      [facility]: Repair    Repair rotator cuff,chronic Left     torn and had a ruptured bicep    Sinus surgery        Spine surgery procedure unlisted      Valve repair      mitral valve        ALL:  Allergies[1]     No current outpatient medications on file.       Social History     Tobacco Use    Smoking status: Former     Current packs/day: 0.00     Average packs/day: 1 pack/day for 27.0 years (27.0 ttl pk-yrs)     Types: Cigarettes     Start date: 1995     Quit date: 2022     Years since quittin.6     Passive exposure: Never    Smokeless tobacco: Never   Substance Use Topics    Alcohol use: No        Fam Hx  Family History   Problem Relation Age of Onset    Hypertension Father     Alcohol and Other Disorders  Associated Father     Substance Abuse Father         cocaine    Dementia Father     Cancer Father         lung    Diabetes Mother     Cancer Mother         multiple myeloma    Hypertension Mother     Anxiety Maternal Aunt     Depression Maternal Aunt     Anxiety Maternal Aunt     Depression Maternal Aunt     Bipolar Disorder Maternal Aunt     Diabetes Maternal Grandmother     Hypertension Maternal Grandmother     Cancer Maternal Grandfather         stomach cancer    Diabetes Maternal Grandfather     Hypertension Maternal Grandfather     Alcohol and Other Disorders Associated Maternal Grandfather     Hypertension Paternal Grandmother     Hypertension Paternal Grandfather     Cancer Sister         uterine and ovarian    Hypertension Sister     Cancer Maternal Uncle         lung    Cancer Paternal Aunt         throat       Review of Systems  Comprehensive ROS reviewed and negative except for what is stated in HPI.      OBJECTIVE:  BP (!) 167/109   Pulse 99   Temp 99.2 °F (37.3 °C) (Temporal)   Resp 24   Ht 6' 2\" (1.88 m)   Wt 240 lb (108.9 kg)   SpO2 92%   BMI 30.81 kg/m²   Physical Exam:  General: Alert, awake, +aphasia.   HEENT:  Normocephalic, atraumatic.  Neck:  Trachea midline.  Neck supple.  Chest:  Clear to auscultation bilaterally. No wheezes, rales, or rhonchi.  CV:  Regular rate and rhythm.  Positive S1/S2. No murmur, no gallops, no rubs  GI: Bowel sounds present in all four quadrants, abdomen is soft, non-tender, non-distended.  Extremities: Bilateral lower extremity edema.  No cyanosis.  Neurological:  AAOx3.  Moving extremities.  Right-sided weakness.  + RUE twitching.  Skin:  Warm and dry.        Data Review:    LABS:   Lab Results   Component Value Date    WBC 9.0 11/06/2024    HGB 9.6 11/06/2024    HCT 28.1 11/06/2024    .0 11/06/2024    CREATSERUM 10.50 11/06/2024    BUN 78 11/06/2024     11/06/2024    K 4.5 11/06/2024     11/06/2024    CO2 22.0 11/06/2024      11/06/2024    CA 8.5 11/06/2024    ALB 3.5 11/06/2024    ALKPHO 85 11/06/2024    BILT 0.5 11/06/2024    TP 7.2 11/06/2024    AST 27 11/06/2024    ALT 21 11/06/2024    PTT 27.5 11/06/2024    INR 1.00 11/06/2024    PTP 13.0 11/06/2024    PGLU 172 11/06/2024       CTH: image personally reviewed.  Negative for acute infarct or hemorrhage.    Radiology: MRI BRAIN (CPT=70551)    Result Date: 11/6/2024  PROCEDURE:  MRI BRAIN (CPT=70551)  COMPARISON:  [facility] , MR, MRI BRAIN (CPT=70551), 8/24/2023, 9:12 PM.  [facility] , MR, MRI SPINE CERVICAL (CPT=72141), 10/19/2024, 8:25 PM.  [facility], CT, CT BRAIN OR HEAD ([id number]), 11/06/2024, 1:13 PM.  INDICATIONS:  slurred speech.  History of TIA.  Shaking and stuttering.  TECHNIQUE:  MRI of the brain was performed with multi-planar T1, T2-weighted images with FLAIR sequences and diffusion weighted images without infusion.  PATIENT STATED HISTORY: (As transcribed by Technologist)  Patient has history of TIA and states he has been having headache and neck pain with uncontrollable shaking and stuttering which has been worse for the past 2 days    FINDINGS:   The ventricles and sulci are normal in size for the patient's age.  No mass-effect, midline shift, hydrocephalus, herniation, extra-axial fluid collections, or signs of acute territorial infarct, or brain tumor.  No abnormality of midline structures. No sign of restricted diffusion. No pathologic gradient susceptibility pattern.  Flow voids are present within the internal carotid and basilar arteries. No sign of acute fluid in the paranasal sinuses. Postinflammatory changes are present within the paranasal sinuses, without findings indicating acute sinusitis.            CONCLUSION:  No abnormalities are seen.   LOCATION:  [id number]   Dictated by (CST): Joe London MD on 11/06/2024 at 3:44 PM     Finalized by (CST): Joe London MD on 11/06/2024 at 3:49 PM       CT BRAIN OR HEAD (CPT=70450)    Result Date: 11/6/2024  PROCEDURE:  CT  BRAIN OR HEAD (70687)  COMPARISON:  [facility], CT, CT BRAIN OR HEAD ([id number]), 3/16/2024, 1:45 AM.  INDICATIONS:  sob, dizziness, slurring  TECHNIQUE:  Noncontrast CT scanning is performed through the brain. Dose reduction techniques were used. Dose information is transmitted to the ACR (American College of Radiology) NRDR (National Radiology Data Registry) which includes the Dose Index Registry.  PATIENT STATED HISTORY: (As transcribed by Technologist)  Patient states he was at his neurologist and has had slurring of speech since Monday and got worse today with tremors.    FINDINGS:  Motion artifact slightly limits assessment. VENTRICLES/SULCI:  Ventricles and sulci are normal in size.  INTRACRANIAL:  There are no abnormal extraaxial fluid collections.  There is no midline shift.  There are no intraparenchymal brain abnormalities.  There is nothing specific for acute infarct.  There is no hemorrhage or mass lesion.  SINUSES:           No sign of acute sinusitis.  MASTOIDS:          No sign of acute inflammation. SKULL:             No evidence for fracture or osseous abnormality. OTHER:             None.            CONCLUSION:  Motion artifact slightly limits assessment.  Within the limitations of the exam there is no significant midline shift or mass effect.  No acute intracranial hemorrhage.  If there is persistent clinical concern then consider MRI.     LOCATION:  [facility]   Dictated by (CST): Smith Randle MD on 11/06/2024 at 1:25 PM     Finalized by (CST): Smith Randle MD on 11/06/2024 at 1:29 PM       XR CHEST AP PORTABLE  (CPT=71045)    Result Date: 11/6/2024  PROCEDURE:  XR CHEST AP PORTABLE  (CPT=71045)  TECHNIQUE:  AP chest radiograph was obtained.  COMPARISON:  [facility], XR, XR CHEST AP PORTABLE  (CPT=71045), 10/17/2024, 7:43 AM.  INDICATIONS:  sob, dizziness, slurring  PATIENT STATED HISTORY: (As transcribed by Technologist)  Patient came in the [facility] today due to shortness of breath, dizziness and slurring of  his words.  Patient offered no additional history.    FINDINGS:  Patient rotated to right.  Tunneled right internal jugular hemodialysis catheter remains in place.  Normal heart size and pulmonary vascularity.  Decreased right basilar opacity.  No new pulmonary opacity.  Mild blunting of right costophrenic angle.            CONCLUSION:  Decreased right basilar pneumonia.  Small right pleural effusion.   LOCATION:  [facility]      Dictated by (CST): Ricki Zaragoza MD on 11/06/2024 at 1:01 PM     Finalized by (CST): Ricki Zaragoza MD on 11/06/2024 at 1:02 PM       US DUPLEX SCAN HEMODIALYSIS ACCESS  (CPT=93990)    Result Date: 10/25/2024  PROCEDURE:  US DUPLEX SCAN HEMODIALYSIS ACCESS  (CPT=93990)  COMPARISON:  [facility], US DUPLEX SCAN HEMODIALYSIS ACCESS  (CPT=93990), 9/18/2024, 2:38 PM.  INDICATIONS:  angiogram on left forearm today co of finger numbness, denies taking pain medication pta, bleeding from rectum an hour ago, brusing on right forearm  TECHNIQUE:  Real time gray scale and duplex color Doppler sonography was used to evaluate the left upper extremity arterial and venous system. Being noted two-dimensional images of the vascular structures, Doppler spectral analysis, and color Doppler imaging were performed  PATIENT STATED HISTORY: (As transcribed by Technologist)  Patient has a left forearm AVF. States he had an angiogram earlier today to cannulate the fistula. Now states hand pain/numbness.    FINDINGS:   Left UPPER EXTREMITY ARTERIAL: Proximal brachial: diameter 8 mm, velocity 100 centimeters/sec, Mid brachial: diameter 8 mm, velocity 125 centimeters/sec, Distal brachial: diameter 8 mm, velocity 85 centimeters/sec, Proximal radial: diameter 6 mm, velocity 134 centimeters/sec Mid radial: diameter 6 mm, velocity 102 centimeters/sec Distal radial: diameter 4 mm, velocity 107 centimeters/sec Fistula/graft arterial anastomosis: diameter 5 mm, velocity 281 centimeters/sec Proximal graft: diameter 6 mm, velocity  216 centimeters/sec Mid graft: diameter 6 mm, velocity 100 centimeters/sec Distal graft: diameter 7 mm, velocity 86 centimeters/sec  Left UPPER EXTREMITY VENOUS: Proximal arm cephalic vein: diameter 3 mm, velocity 6 centimeters/sec Mid arm cephalic vein:  diameter 3 mm, velocity 8 centimeters/sec, Distal arm cephalic vein: diameter 4 mm, velocity 8 centimeters/sec Proximal forearm basilic vein: diameter 5 mm, velocity 14 centimeters/sec Mid forearm basilic vein: diameter 6 mm, velocity 14 centimeters/sec Distal basilic vein: diameter 6 mm, velocity 11 centimeters/sec  OTHER FINDINGS:            CONCLUSION:  The left radial artery to cephalic vein fistula is patent.  There is mild elevation of velocities at the arterial anastomosis could indicate a mild/moderate stenosis.   LOCATION:  [facility]    Dictated by (CST): Jaycob Cassidy MD on 10/25/2024 at 5:58 AM     Finalized by (CST): Jaycob Cassidy MD on 10/25/2024 at 6:03 AM       MRI SPINE CERVICAL (CPT=72141)    Result Date: 10/19/2024  PROCEDURE:  MRI SPINE CERVICAL (CPT=72141)  COMPARISON:  None.  INDICATIONS:  Worsening cervical radiculopathy pain, UE weakness.  No IV contrast.  TECHNIQUE:  Multiplanar T1 and T2 weighted images including fat suppression sequences.  Images acquired in sagittal and axial planes.   PATIENT STATED HISTORY: (As transcribed by Technologist)  Patient is being evaluated for Worsening cervical radiculopathy pain, UE weakness.    FINDINGS:  Motion artifact is present. CERVICAL DISC LEVELS: C2-C3:  Mild facet hypertrophy.  No spinal canal or neural foraminal stenosis. C3-C4:  Minimal posterior acid disc complex with bilateral facet hypertrophy.  No spinal canal or neural foraminal stenosis. C4-C5:  Minimal posterior osteophyte disc complex with bilateral facet hypertrophy.  No significant spinal canal stenosis.  Mild bilateral neural foraminal stenosis. C5-C6:  Minimal posterior acid disc complex with bilateral facet hypertrophy.  Mild to  moderate right-sided neural foraminal stenosis.  No spinal canal stenosis. C6-C7:  Is minimal posterior osteophyte disc complex with left paracentral predominance.  No significant spinal canal stenosis.  No significant neural foraminal stenosis. C7-T1:  Mild facet hypertrophy.  No spinal canal or neural foraminal stenosis.  CRANIOCERVICAL AREA:  Normal foramen magnum with no Chiari malformation.  PARASPINAL AREA:  Normal with no visible mass.  BONY STRUCTURES:  No fracture, pars defect, or osseous lesion.  Diffuse dark T1 and T2 marrow signal noted within the vertebral bodies which is nonspecific but may represent marrow conversion or possible myeloproliferative disease. CORD:  Normal caliber, contour, and signal intensity.             CONCLUSION:  Mild degenerative changes as described above.   LOCATION:  [facility]   Dictated by (CST): Quinn Bernal MD on 10/19/2024 at 9:12 PM     Finalized by (CST): Quinn Beranl MD on 10/19/2024 at 9:19 PM       XR CHEST AP PORTABLE  (CPT=71045)    Result Date: 10/17/2024  PROCEDURE:  XR CHEST AP PORTABLE  (CPT=71045)  TECHNIQUE:  AP chest radiograph was obtained.  COMPARISON:  [facility], XR, XR CHEST AP PORTABLE  (CPT=71045), 9/29/2024, 1:49 PM.  INDICATIONS:  miss dialysis [facility]  PATIENT STATED HISTORY: (As transcribed by Technologist)  Patient states he missed his dialysis, fluid overload, and shortness of breath.              CONCLUSION:    Worsening appearance, now with development of moderate patchy infiltrate right base with redemonstration elevated right diaphragm, and blunting the costophrenic angle consistent with developing pneumonia and effusion on the right.  Left chest stable.  Stable cardiac enlargement.  No pneumothorax.  No other significant change.   LOCATION:  [facility]      Dictated by (CST): Joe London MD on 10/17/2024 at 8:15 AM     Finalized by (CST): Joe London MD on 10/17/2024 at 8:15 AM          Assessment/Plan:   44 year old male with PMH sig  for TIA, ESRD on HD, HFpEF, HTN, T2DM, CAD, COPD, PE who presents for speech changes.     #CVA r/o  #TIA h/o  -CT and MRI brain negative  -Neurochecks  -Monitor on telemetry  -Cont ASA  -Neurology consulted, appreciate recommendations.  -PT/OT    # Hypertensive urgency  #cHTN  -No need for permissive hypertension given symptoms have been going on for several days.  Continue home Coreg, losartan, hydralazine, nifedipine.  IV labetalol as needed.    # ESRD on HD  # HFpEF, chronic  -Nephrology consulted  -Optimized volume status with hemodialysis    # Bipolar disorder  -Continue Abilify, bupropion, Lamictal    # Depression  -Continue fluoxetine    #COPD, chronic  -Continue home inhalers    Discussed with ED physician      CODE STATUS: Full code  DVT prophylaxis: Heparin subcu      Outpatient records or previous hospital records reviewed.   [facility] hospitalist to continue to follow patient while in house  A total of 90 minutes taken with patient and coordinating care.  Greater than 50% face to face encounter.      Vicky Weiss DO  [facility] Hospitalist      **Certification      PHYSICIAN Certification of Need for Inpatient Hospitalization - Initial Certification    Patient will require inpatient services that will reasonably be expected to span two midnight's based on the clinical documentation in H+P.   Based on patients current state of illness, I anticipate that, after discharge, patient will require TBD.         [1]   Allergies  Allergen Reactions    Hydrochlorothiazide RASH and HIVES

## 2024-11-06 NOTE — ED INITIAL ASSESSMENT (HPI)
Pt states he was at his neurologist and has had slurring of speech since Monday and got worse today w tremors. Pt states dr sent him here.

## 2024-11-06 NOTE — ED QUICK NOTES
Orders for admission, patient is aware of plan and ready to go upstairs. Any questions, please call ED RN Chelsi at extension A&OX#.     Patient Covid vaccination status: Fully vaccinated     COVID Test Ordered in ED: None    COVID Suspicion at Admission: N/A    Running Infusions:  None    Mental Status/LOC at time of transport: A&OX3    Other pertinent information:   CIWA score: N/A   NIH score:  1

## 2024-11-06 NOTE — ED PROVIDER NOTES
Patient Seen in: [facility] In [place]      History     Chief Complaint   Patient presents with    Stroke     Stated Complaint: sob, dizziness, slurring    Subjective:   HPI      The patient is a 44-year-old male presents emergency room with history of multiple complaints.  The patient has a history of previous TIA history of hypertension, end-stage renal disease, anxiety, and coronary atherosclerosis who states he was at his neurologist office today and has been having some slurring of his speech and difficulty finding words which has been ongoing over the last several days.  The patient reportedly has some residual right-sided weakness from his stroke/TIA that he had several years ago.  The patient has chronic neck pain which is unchanged today from previous.  Patient did have an MRI of the cervical spine done approximately 2 and half weeks ago which showed degenerative changes as per review of medical records.  The patient denies headache.  The patient states he is dialyzed Monday Wednesday Friday he was due for dialysis today but did not get dialyzed.  Patient states that the patient states that he has no history of any new weakness or numbness to his arms or legs.  The patient denies history of any other somatic complaints or discomfort at this time.    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[facility])    Congestive heart disease ([facility])    COPD (chronic obstructive pulmonary disease) ([facility])    Coronary atherosclerosis    Deep vein thrombosis ([facility])    at age 17 R/T cast    Depression    Diabetes ([facility])    Dialysis patient ([facility])    Diverticulosis of large intestine    Essential hypertension    3/21 echo: severe concentric LVH with normal EF and no MR or  pHTN    Extrinsic asthma, unspecified    Heart attack (HCC)    2016- angiogram- no intervention    Heart valve disease    mitral valve repair in 1994/    High blood pressure    High cholesterol    History of blood transfusion    History of mitral valve repair    Hyperlipidemia    Low back pain    tight and stiff after sweeping and mopping    LVH (left ventricular hypertrophy)    Migraines    Mixed hyperlipidemia     HDL 38 LDL 97 VLDL 57     Monoclonal gammopathy    IgG kappa     Muscle weakness    MVP (mitral valve prolapse)    Repair 1994 at [place]; echoes as recently as 3/21 show mild or trivial MR and no stenosis    Neuropathy    Osteoarthritis    hip ,knees    Pneumonia due to organism    Pulmonary embolism (HCC)    Renal disorder    Stroke (HCC)    TIA (transient ischemic attack)    Initial history of left-sided weakness and slurred speech. (+) cocaine. MRI of the brain, CT angiogram of the head and neck, and 2D echo are all unremarkable.     TMJ (dislocation of temporomandibular joint)    Troponin level elevated    Trop 60 60 47 with TIA and no CP: Lexiscan negative with EF 51    Visual impairment              Past Surgical History:   Procedure Laterality Date    Av fistula revision, open Left     Cabg      Colonoscopy N/A 03/26/2023    Procedure: COLONOSCOPY;  Surgeon: Heath Vu MD;  Location:  ENDOSCOPY    Colonoscopy N/A 12/30/2023    Procedure: COLONOSCOPY with cold snare polypectomy and forcep polypectomy;  Surgeon: Ousmane Suarez MD;  Location:  ENDOSCOPY    Colonoscopy & polypectomy  2019    Egd  2019    Duodenitis. Biopsied. EUS for weight loss was negative    Heart surgery      Hernia surgery  08/17/2022    Dr Barnes    Laminectomy,>2 sgmt,lumbar  09/06/2018    L4-L5 Decomp Discectomy ROEM L4-L5    Mitralplasty w cp bypass  1994    [place]: Repair    Repair rotator cuff,chronic Left     torn and had a ruptured bicep    Sinus surgery        Spine surgery procedure unlisted       Valve repair      mitral valve                Social History     Socioeconomic History    Marital status:    Tobacco Use    Smoking status: Former     Current packs/day: 0.00     Average packs/day: 1 pack/day for 27.0 years (27.0 ttl pk-yrs)     Types: Cigarettes     Start date: 1995     Quit date: 2022     Years since quittin.6     Passive exposure: Never    Smokeless tobacco: Never   Vaping Use    Vaping status: Never Used   Substance and Sexual Activity    Alcohol use: No    Drug use: No     Social Drivers of Health     Financial Resource Strain: Medium Risk (2024)    Received from [facility]    Overall Financial Resource Strain (CARDIA)     Difficulty of Paying Living Expenses: Somewhat hard   Food Insecurity: No Food Insecurity (10/17/2024)    Food Insecurity     Food Insecurity: Never true   Transportation Needs: No Transportation Needs (10/17/2024)    Transportation Needs     Lack of Transportation: No   Physical Activity: Inactive (2024)    Received from [facility]    Exercise Vital Sign     Days of Exercise per Week: 0 days     Minutes of Exercise per Session: 0 min   Stress: Stress Concern Present (2024)    Received from [facility]    [employer] - Occupational Stress Questionnaire     Feeling of Stress : Rather much   Social Connections: Unknown (2024)    Received from [facility]    Social Connection and Isolation Panel [NHANES]     Frequency of Communication with Friends and Family: More than three times a week     Frequency of Social Gatherings with Friends and Family: More than three times a week     Attends [employer] Services: Never     Active Member of Clubs or Organizations: No     Attends Club or Organization Meetings: Never     Marital Status: Patient unable to answer   Housing Stability: Low Risk  (10/17/2024)    Housing Stability     Housing Instability: No                  Physical Exam      ED Triage Vitals   BP 11/06/24 1155 (!) 188/123   Pulse 11/06/24 1155 112   Resp 11/06/24 1155 22   Temp 11/06/24 1158 99.2 °F (37.3 °C)   Temp src 11/06/24 1158 Temporal   SpO2 11/06/24 1225 96 %   O2 Device 11/06/24 1222 None (Room air)       Current Vitals:   Vital Signs  BP: (!) 177/119  Pulse: 101  Resp: 23  Temp: 99.2 °F (37.3 °C)  Temp src: Temporal    Oxygen Therapy  SpO2: 98 %  O2 Device: None (Room air)        Physical Exam  GENERAL: Well-developed, well-nourished male sitting up breathing easily in no apparent distress.  Patient is nontoxic in appearance.  HEENT: Head is normocephalic, atraumatic. Pupils are 4 mm equally round and reactive to light. Oropharynx is clear. Mucous membranes are moist.  NECK: There is no focal tenderness to palpation appreciated.  No stridor.  LUNGS: Clear at the apices with diminished breath sounds at both bases. There is good equal air entry bilaterally.  HEART: Regular rate and rhythm. Normal S1, S2 no S3, or S4. No murmur.  ABDOMEN: There is no focal tenderness to palpation appreciated anywhere throughout the abdomen. There is no guarding, no rebound, no mass, and no organomegaly appreciated. There is normoactive bowel sounds.   EXTREMITIES: There is no cyanosis, clubbing, or edema appreciated. Pulses are 2+ and equal in all 4 extremities.  There is an AV fistula with a palpable thrill noted to the left forearm.  NEURO: Patient is awake, alert and oriented to time place and person.  Patient does appear to have some difficulty finding words at this time.  Motor strength is 5 over 5 in all 4 extremities. There are no gross motor or sensory deficits appreciated. Cranial nerves II through XII are intact.  No evidence of any facial droop appreciated.          ED Course     Labs Reviewed   COMP METABOLIC PANEL (14) - Abnormal; Notable for the following components:       Result Value    Glucose 151 (*)     BUN 78 (*)     Creatinine 10.50 (*)     Calculated Osmolality 310 (*)      eGFR-Cr 6 (*)     A/G Ratio 0.9 (*)     All other components within normal limits   CBC WITH DIFFERENTIAL WITH PLATELET - Abnormal; Notable for the following components:    RBC 2.90 (*)     HGB 9.6 (*)     HCT 28.1 (*)     All other components within normal limits   TROPONIN I HIGH SENSITIVITY - Abnormal; Notable for the following components:    Troponin I (High Sensitivity) 474 (*)     All other components within normal limits   POCT GLUCOSE - Abnormal; Notable for the following components:    POC Glucose 172 (*)     All other components within normal limits   PROTHROMBIN TIME (PT) - Normal   PTT, ACTIVATED - Normal   LIPID PANEL     EKG    Rate, intervals and axes as noted on EKG Report.  Rate: 103  Rhythm: Sinus Rhythm  Reading: No acute ST elevation appreciated.                CT BRAIN OR HEAD (CPT=70450)    Result Date: 11/6/2024  CONCLUSION:  Motion artifact slightly limits assessment.  Within the limitations of the exam there is no significant midline shift or mass effect.  No acute intracranial hemorrhage.  If there is persistent clinical concern then consider MRI.     LOCATION:  [facility]   Dictated by (CST): Smith Randle MD on 11/06/2024 at 1:25 PM     Finalized by (CST): Smith Randle MD on 11/06/2024 at 1:29 PM       XR CHEST AP PORTABLE  (CPT=71045)    Result Date: 11/6/2024  CONCLUSION:  Decreased right basilar pneumonia.  Small right pleural effusion.   LOCATION:  [id number]      Dictated by (CST): Ricki Zaragoza MD on 11/06/2024 at 1:01 PM     Finalized by (CST): Ricki Zaragoza MD on 11/06/2024 at 1:02 PM              MDM      Patient's original NIH score is 1 here in the ER.    Admission disposition: 11/6/2024  1:56 PM           Medical Decision Making  Patient had IV line established blood work drawn including CBC, chemistries, BUN/creatinine, and blood sugar shows evidence of elevated BUN/creatinine as per baseline.  Patient troponin is elevated at 474 essentially unchanged from multiple repeat troponins had  been elevated for him over the last several months.  Patient's coags are unremarkable.  Patient was found to be hypertensive here in the ER he is chronically hypertensive as per discussion with Dr. Bond, from nephrology who knows the patient well from previous admissions he would like the patient to receive IV labetalol here in the ER and she will arrange to get the patient dialyzed today.  The patient's case has been discussed with Dr. Jones from neurology who is in agreement with the need for admission he would like an MRI to be ordered which we will attempt to get done from the emergency room here prior to transfer to the hospital to be admitted.  Dr. Jones did not feel the need for holding off on the patient's dialysis which is due today based on patient's symptoms.  The patient has been given IV hydralazine  for blood pressure here in the ER.  The patient's case we discussed the [facility] hospitalist.  The patient will be admitted for further care at this time.  Patient awaiting MRI and admission at this time.  Case endorsed to my colleague, Dr. Kay at this time.    14:45 patient will get MRI of the brain prior to transfer to the hospital from the emergency room.  Patient is not a candidate for TNK as his symptoms have been ongoing for greater than 24 hours and his NIH score is only a 1.  The patient is awaiting MRI and transfer at this time.  Patient's case has been discussed with the [facility] hospitalist.      Disposition and Plan     Clinical Impression:  1. Expressive aphasia    2. Uncontrolled hypertension    3. Elevated troponin         Disposition:  Admit  11/6/2024  1:56 pm    Follow-up:  No follow-up provider specified.        Medications Prescribed:  Current Discharge Medication List              Supplementary Documentation:         Hospital Problems       Present on Admission  Date Reviewed: 10/8/2024            ICD-10-CM Noted POA    * (Principal) Expressive aphasia R47.01 11/6/2024 Unknown

## 2024-11-07 LAB
ALBUMIN SERPL-MCNC: 4.3 G/DL (ref 3.2–4.8)
ANION GAP SERPL CALC-SCNC: 10 MMOL/L (ref 0–18)
ATRIAL RATE: 84 BPM
BASOPHILS # BLD AUTO: 0.08 X10(3) UL (ref 0–0.2)
BASOPHILS NFR BLD AUTO: 0.9 %
BUN BLD-MCNC: 37 MG/DL (ref 9–23)
CALCIUM BLD-MCNC: 8.9 MG/DL (ref 8.7–10.4)
CHLORIDE SERPL-SCNC: 101 MMOL/L (ref 98–112)
CO2 SERPL-SCNC: 27 MMOL/L (ref 21–32)
CREAT BLD-MCNC: 6.54 MG/DL
EGFRCR SERPLBLD CKD-EPI 2021: 10 ML/MIN/1.73M2 (ref 60–?)
EOSINOPHIL # BLD AUTO: 0.3 X10(3) UL (ref 0–0.7)
EOSINOPHIL NFR BLD AUTO: 3.4 %
ERYTHROCYTE [DISTWIDTH] IN BLOOD BY AUTOMATED COUNT: 14.1 %
FOLATE SERPL-MCNC: 16.6 NG/ML (ref 5.4–?)
GLUCOSE BLD-MCNC: 102 MG/DL (ref 70–99)
GLUCOSE BLD-MCNC: 169 MG/DL (ref 70–99)
GLUCOSE BLD-MCNC: 176 MG/DL (ref 70–99)
GLUCOSE BLD-MCNC: 99 MG/DL (ref 70–99)
HCT VFR BLD AUTO: 28.2 %
HGB BLD-MCNC: 9.8 G/DL
IMM GRANULOCYTES # BLD AUTO: 0.04 X10(3) UL (ref 0–1)
IMM GRANULOCYTES NFR BLD: 0.5 %
LYMPHOCYTES # BLD AUTO: 1.1 X10(3) UL (ref 1–4)
LYMPHOCYTES NFR BLD AUTO: 12.6 %
MAGNESIUM SERPL-MCNC: 1.9 MG/DL (ref 1.6–2.6)
MCH RBC QN AUTO: 32 PG (ref 26–34)
MCHC RBC AUTO-ENTMCNC: 34.8 G/DL (ref 31–37)
MCV RBC AUTO: 92.2 FL
MONOCYTES # BLD AUTO: 0.91 X10(3) UL (ref 0.1–1)
MONOCYTES NFR BLD AUTO: 10.4 %
NEUTROPHILS # BLD AUTO: 6.32 X10 (3) UL (ref 1.5–7.7)
NEUTROPHILS # BLD AUTO: 6.32 X10(3) UL (ref 1.5–7.7)
NEUTROPHILS NFR BLD AUTO: 72.2 %
OSMOLALITY SERPL CALC.SUM OF ELEC: 295 MOSM/KG (ref 275–295)
P AXIS: 50 DEGREES
P-R INTERVAL: 188 MS
PHOSPHATE SERPL-MCNC: 7 MG/DL (ref 2.4–5.1)
PLATELET # BLD AUTO: 216 10(3)UL (ref 150–450)
POTASSIUM SERPL-SCNC: 4.2 MMOL/L (ref 3.5–5.1)
Q-T INTERVAL: 472 MS
QRS DURATION: 92 MS
QTC CALCULATION (BEZET): 557 MS
R AXIS: 40 DEGREES
RBC # BLD AUTO: 3.06 X10(6)UL
SODIUM SERPL-SCNC: 138 MMOL/L (ref 136–145)
T AXIS: 47 DEGREES
TSI SER-ACNC: 1.62 UIU/ML (ref 0.55–4.78)
VENTRICULAR RATE: 84 BPM
VIT B12 SERPL-MCNC: 1194 PG/ML (ref 211–911)
WBC # BLD AUTO: 8.8 X10(3) UL (ref 4–11)

## 2024-11-07 PROCEDURE — 99223 1ST HOSP IP/OBS HIGH 75: CPT | Performed by: OTHER

## 2024-11-07 RX ORDER — HEPARIN SODIUM 1000 [USP'U]/ML
1.5 INJECTION, SOLUTION INTRAVENOUS; SUBCUTANEOUS
Status: DISCONTINUED | OUTPATIENT
Start: 2024-11-08 | End: 2024-11-09

## 2024-11-07 RX ORDER — LOSARTAN POTASSIUM 100 MG/1
100 TABLET ORAL DAILY
Status: DISCONTINUED | OUTPATIENT
Start: 2024-11-07 | End: 2024-11-09

## 2024-11-07 RX ORDER — ALBUMIN (HUMAN) 12.5 G/50ML
25 SOLUTION INTRAVENOUS
Status: DISCONTINUED | OUTPATIENT
Start: 2024-11-07 | End: 2024-11-09

## 2024-11-07 RX ORDER — METHOCARBAMOL 500 MG/1
500 TABLET, FILM COATED ORAL 3 TIMES DAILY
Status: DISCONTINUED | OUTPATIENT
Start: 2024-11-07 | End: 2024-11-09

## 2024-11-07 RX ORDER — ASPIRIN 81 MG/1
81 TABLET, CHEWABLE ORAL DAILY
Status: DISCONTINUED | OUTPATIENT
Start: 2024-11-07 | End: 2024-11-09

## 2024-11-07 RX ORDER — TRAMADOL HYDROCHLORIDE 50 MG/1
50 TABLET ORAL EVERY 12 HOURS PRN
Status: DISCONTINUED | OUTPATIENT
Start: 2024-11-07 | End: 2024-11-09

## 2024-11-07 RX ORDER — DIPHENHYDRAMINE HCL 25 MG
25 CAPSULE ORAL EVERY 6 HOURS PRN
Status: DISCONTINUED | OUTPATIENT
Start: 2024-11-07 | End: 2024-11-09

## 2024-11-07 RX ORDER — SEVELAMER CARBONATE 800 MG/1
1600 TABLET, FILM COATED ORAL
Status: DISCONTINUED | OUTPATIENT
Start: 2024-11-07 | End: 2024-11-08

## 2024-11-07 NOTE — OCCUPATIONAL THERAPY NOTE
OCCUPATIONAL THERAPY EVALUATION - INPATIENT     Room Number: 7615/7615-A  Evaluation Date: 11/7/2024  Type of Evaluation: Initial  Presenting Problem: slurred speech, expressive aphasia, body tremors and twitching; 11/6/24 MRI of brain neg for acute finding    Physician Order: IP Consult to Occupational Therapy  Reason for Therapy: ADL/IADL Dysfunction and Discharge Planning    OCCUPATIONAL THERAPY ASSESSMENT   Patient is currently functioning below baseline with self care and functional mobility. Prior to admission, patient's baseline is independent.  Patient is requiring minimum assistance as a result of the following impairments: decreased functional strength, decreased functional reach, decreased endurance, pain, impaired dynamic standing balance, impaired coordination, impaired motor planning, and medical status. Occupational Therapy will continue to follow for duration of hospitalization.    Patient will benefit from continued skilled OT Services at discharge to promote prior level of function and safety with additional support and return home with OP OT    History Related to Current Admission: Patient is a 44 year old male admitted on 11/6/2024 with Presenting Problem: slurred speech, expressive aphasia, body tremors and twitching; 11/6/24 MRI of brain neg for acute finding. Co-Morbidities : Chronic GIB, pt reports C5-6 compression fx, CKD on HD, DM2, HTN, CAD, COPD, bipolar d/o, ADD, neuropathy, anxiety, depression, PE    Labs: 11/7/24: Creatinine: 6.54    Imaging  MRI BRAIN (CPT=70551)  Result Date: 11/6/2024  CONCLUSION:  No abnormalities are seen.     Recent hospitalizations:  10/17/24-10/22/24: HTN urgency=home  10/6/24-10/8/24: diverticulosis of colon w/ hemorrhage=home  09/29/24-10/1/24: hemodialysis catheter infection= home  9/15-9/20/24: rectal bleeding=home  08/30/24-9/2/24: Acute respiratory failure w/ hypoxia, GIB=home  06/14-6/17/24: missed HD sessions=home  05/14/24-5/17/24:  pneumonia=home  24-24: missed HD, abdominal pain=home    Recommendations for nursing staff:   Transfers: one person and RW  Toileting location: toilet w/ riser      EVALUATION SESSION:  Patient Start of Session: supine    FUNCTIONAL TRANSFER ASSESSMENT  Sit to Stand: Edge of Bed; Chair  Edge of Bed: Contact Guard Assist  Chair: Contact Guard Assist    BED MOBILITY  Rolling: Stand-by Assist  Supine to Sit : Stand-by Assist  Sit to Supine (OT): Not Tested  Scooting: supervision    BALANCE ASSESSMENT  Static Sitting: Supervision  Static Standing: Contact Guard Assist    FUNCTIONAL ADL ASSESSMENT  Eating: Modified Independent  Grooming Seated: Modified Independent  LB Dressing Seated: Minimal Assist  Toileting Seated: Minimal Assist    ACTIVITY TOLERANCE:   Pulse: 98  Heart Rate Source: Monitor         Prior to session in supine: 140/73  Sitting EOB: 105/73  Standin/68  BP Method: Automatic  Patient Position: Sitting    O2 SATURATIONS  Oxygen Therapy  SPO2% on Room Air at Rest: 94  SPO2% Ambulation on Room Air: 94    COGNITION  Arousal/Alertness:  appropriate responses to stimuli  Attention Span:  appears intact  Orientation Level:  oriented x4  Memory:  appears intact  Following Commands:  follows one step commands without difficulty  Initiation: appears intact  Motor Planning: impaired  Perseveration: not present  Safety Judgement:  good awareness of safety precautions  Awareness of Errors:  good awareness of errors made  Awareness of Deficits:  fully aware of deficits  Problem Solving:  assistance required to identify errors made    COGNITION ASSESSMENTS     VISION  Head Position:  WDL = within defined limits  Tracking:  able to track stimulus in all quads without difficulty  Acuity:  able to read clock/calendar on wall without difficulty  Patient w/ c/o \"wavy\" vision ; no double vision reported    PERCEPTION  Overall Perception Status:   WFL - within functional limits    Communication: Able to make  needs known w/ noted slurred speech    Behavioral/Emotional/Social: pleasant, cooperative    UPPER EXTREMITY  ROM: within functional limits   Strength: is within functional limits   Coordination  Gross motor: impaired  Fine motor: impaired R>L  Sensation: Light touch:  intact  Proprioception:  intact  Stereognosis:  intact    Neurological findings:  Neurological Findings: Coordination - Finger Opposition;Coordination - Finger to Nose  Coordination - Finger to Nose: Left decreased accuracy;Right decreased accuracy     Coordination - Finger Opposition: Left decreased accuracy;Right decreased accuracy       EDUCATION PROVIDED  Patient Education : Role of Occupational Therapy; Plan of Care; Discharge Recommendations; Fall Prevention; Functional Transfer Techniques; Energy Conservation; Proper Body Mechanics  Patient's Response to Education: Verbalized Understanding; Requires Further Education    Equipment used: RW     Therapist comments:   At rest, noted involuntary twitching/jerky movements to all extremities, shoulders, neck, face and eyelids.  Tremors are present right > left hands and patient reports this has been present for a while now.  Patient states the symptoms that are new are the jerky movements and started after almost passing out after dialysis on 11/4/24    Patient End of Session: Up in chair;Needs met;Call light within reach;RN aware of session/findings;All patient questions and concerns addressed;Alarm set;Discussed recommendations with /    OCCUPATIONAL PROFILE    HOME SITUATION  Type of Home: House  Home Layout: Two level  Lives With: Other (Comment) (mother - pt is a CG for his mother, now his aunt and cousin are helping)    Toilet and Equipment: Comfort height toilet  Shower/Tub and Equipment: Walk-in shower;Shower chair  Other Equipment:  (cane, walker but does not use)    Occupation/Status: on disability x 2 years d/t ESRD on HD  Hand Dominance: Right  Drives: Yes  Patient  Regularly Uses: Reading glasses (shower chair)    Prior Level of Function: Pt reports that he is typically indep with ADLs and mobility. Reports living with his mom who had a previous stroke with R sided weakness so he assists her with laundry and stairs. Pt reports a previous TIA with residual R sided weakness as well Leg>arm, reports limited with long distance ambulation and stairs, takes a break.    SUBJECTIVE   \"I see wavy.\"    PAIN ASSESSMENT  Rating: Unable to rate  Location: all over, especially neck and arms  Management Techniques: Nurse notified    OBJECTIVE  Precautions: Bed/chair alarm  Fall Risk: High fall risk    ASSESSMENTS  AM-PAC ‘6-Clicks’ Inpatient Daily Activity Short Form  -   Putting on and taking off regular lower body clothing?: A Little  -   Bathing (including washing, rinsing, drying)?: A Little  -   Toileting, which includes using toilet, bedpan or urinal? : A Little  -   Putting on and taking off regular upper body clothing?: A Little  -   Taking care of personal grooming such as brushing teeth?: A Little  -   Eating meals?: A Little    AM-PAC Score:  Score: 18  Approx Degree of Impairment: 46.65%  Standardized Score (AM-PAC Scale): 38.66    ADDITIONAL TESTS     NEUROLOGICAL FINDINGS  Coordination - Finger to Nose: Left decreased accuracy; Right decreased accuracy  Coordination - Finger Opposition: Left decreased accuracy; Right decreased accuracy     PLAN  OT Device Recommendations: TBD  OT Treatment Plan: Balance activities;Energy conservation/work simplification techniques;ADL training;Functional transfer training;UE strengthening/ROM;Endurance training;Patient/Family education;Patient/Family training;Cognitive reorientation;Equipment eval/education;Compensatory technique education  Rehab Potential : Good  Frequency: 3-5x/week  Number of Visits to Meet Established Goals: 7    ADL Goals   Patient will perform grooming: with modified independent  Patient will perform lower body dressing:   with modified independent  Patient will perform toileting: with modified independent    Functional Transfer Goals  Patient will transfer from sit to supine:  with modified independent  Patient will transfer from supine to sit:  with modified independent  Patient will transfer from sit to stand:  with modified independent  Patient will transfer to toilet:  with modified independent      Patient Evaluation Complexity Level:   Occupational Profile/Medical History MODERATE - Expanded review of history including review of medical or therapy record   Specific performance deficits impacting engagement in ADL/IADL LOW  1 - 3 performance deficits    Client Assessment/Performance Deficits LOW - No comorbidities nor modifications of tasks    Clinical Decision Making LOW - Analysis of occupational profile, problem-focused assessments, limited treatment options    Overall Complexity LOW     OT Session Time: 30 minutes  Self-Care Home Management: 15 minutes

## 2024-11-07 NOTE — CM/SW NOTE
SW consulted for \"dialysis\". Chart reviewed, during pt's last admission pt expressed interest in switching HD locations to [facility]. Per conversations, preferred locations did not have available chair times for pt's preferred schedule. HD clinics were going to work with pt on switching clinics. Pt agreed to continue MWF schedule at [address] [address]  Center Phone: [phone]  Center Fax [phone]    JOSH Haynes

## 2024-11-07 NOTE — PLAN OF CARE
Assumed care at 1800  Patient with slurred speech, stuttering, mild expressive aphasia. Full body tremors at times  Complaining of throat pain and \"tongue swollen\" since last night. IV benadryl given x1  Dilaudid and tramadol given for pain  HD started this evening  BP elevated. Labetolol given x1. Patient requesting to take PO meds after HD completed    Plan of care discussed with patient

## 2024-11-07 NOTE — PROGRESS NOTES
[facility] Hospitalist Progress Note     PCP: Adrian Small MD    SUBJECTIVE:  Was reporting chest tightness overnight, however not this morning.  Mostly complaining of headache and neck pain/spasms.  Still having difficulty with speech and twitching.    OBJECTIVE:  Temp:  [97.6 °F (36.4 °C)-99.2 °F (37.3 °C)] 98.5 °F (36.9 °C)  Pulse:  [] 101  Resp:  [20-32] 20  BP: (149-203)/() 149/109  SpO2:  [92 %-100 %] 94 %    Intake/Output:    Intake/Output Summary (Last 24 hours) at 11/7/2024 1033  Last data filed at 11/7/2024 0356  Gross per 24 hour   Intake --   Output 500 ml   Net -500 ml       Last 3 Weights   11/06/24 1743 240 lb (108.9 kg)   11/06/24 1155 240 lb (108.9 kg)   10/24/24 2355 240 lb (108.9 kg)   10/22/24 0626 241 lb 2.9 oz (109.4 kg)   10/21/24 0500 248 lb 14.4 oz (112.9 kg)   10/20/24 0531 246 lb 7.6 oz (111.8 kg)   10/19/24 2326 239 lb 13.8 oz (108.8 kg)   10/19/24 0354 239 lb 13.8 oz (108.8 kg)   10/18/24 0541 245 lb 9.5 oz (111.4 kg)   10/17/24 1136 256 lb 6.3 oz (116.3 kg)   10/17/24 0703 240 lb (108.9 kg)       Exam  Physical Exam:  General: Alert, awake, +aphasia.   HEENT:  Normocephalic, atraumatic.  Neck:  Trachea midline.  Neck supple.  Chest:  Clear to auscultation bilaterally. No wheezes, rales, or rhonchi.  CV:  Regular rate and rhythm.  Positive S1/S2. No murmur, no gallops, no rubs  GI: Bowel sounds present in all four quadrants, abdomen is soft, non-tender, non-distended.  Extremities: Bilateral lower extremity edema.  No cyanosis.  Neurological:  AAOx3.  Moving extremities.  Right-sided weakness.  + RUE twitching.  Skin:  Warm and dry.      Data Review:       Labs:     Recent Labs   Lab 11/06/24  1206 11/07/24  0601   WBC 9.0 8.8   HGB 9.6* 9.8*   MCV 96.9 92.2   .0 216.0   INR 1.00  --        Recent Labs   Lab 11/06/24  1206 11/07/24  0601    138   K 4.5 4.2    101   CO2 22.0 27.0   BUN 78* 37*   CREATSERUM 10.50* 6.54*   CA 8.5 8.9   MG  --  1.9   PHOS  --   7.0*   * 102*       Recent Labs   Lab 11/06/24  1206 11/07/24  0601   ALT 21  --    AST 27  --    ALB 3.5 4.3       Recent Labs   Lab 11/06/24  1156 11/06/24  1847   PGLU 172* 122*       No results for input(s): \"TROP\" in the last 168 hours.      Meds:      aspirin  81 mg Oral Daily    losartan  100 mg Oral Daily    methocarbamol  500 mg Oral TID    sevelamer carbonate  1,600 mg Oral TID CC    heparin  1.5 mL Intracatheter Once    ARIPiprazole  15 mg Oral Daily    buPROPion ER  150 mg Oral Daily    carvedilol  25 mg Oral BID with meals    FLUoxetine HCl  40 mg Oral Daily    fluticasone propionate  2 spray Each Nare Daily    hydrALAZINE  50 mg Oral Q8H MARTHA    lamoTRIgine  150 mg Oral Daily    amphetamine-dextroamphetamine  15 mg Oral BID AC    memantine  5 mg Oral BID    NIFEdipine ER  60 mg Oral BID    tamsulosin  0.4 mg Oral Daily    heparin  5,000 Units Subcutaneous Q8H MARTHA         traMADol    sodium chloride **AND** albumin human    albuterol    labetalol    acetaminophen **OR** [DISCONTINUED] HYDROcodone-acetaminophen **OR** [DISCONTINUED] HYDROcodone-acetaminophen    ondansetron    prochlorperazine    polyethylene glycol (PEG 3350)    sennosides    bisacodyl    HYDROmorphone **OR** HYDROmorphone **OR** HYDROmorphone       Assessment/Plan:   44 year old male with PMH sig for TIA, ESRD on HD, HFpEF, HTN, T2DM, CAD, COPD, PE who presents for speech changes.      #Acute CVA vs movement disorder vs psychiatric disorder  #TIA/ CVA h/o  -CT and MRI brain negative  -Neurochecks  -Monitor on telemetry  -Cont ASA  -Neurology consulted, appreciate further recommendations.  -PT/OT     # Hypertensive urgency, resolved  #cHTN  -No need for permissive hypertension given symptoms have been going on for several days.  Continue home Coreg, losartan, hydralazine, nifedipine.  IV labetalol as needed.    # Cervical radiculopathy  -Pain control as needed  -Follow-up with spine surgeon     # ESRD on HD  # HFpEF,  chronic  -Nephrology consulted  -Optimized volume status with hemodialysis     # Bipolar disorder  -Continue Abilify, bupropion, Lamictal     # Depression  -Continue fluoxetine     #COPD, chronic  -Continue home inhalers    Vicky Weiss DO  [facility] Hospitalist

## 2024-11-07 NOTE — PLAN OF CARE
Assumed care at 0700.   A&O x4, RA, NSR on tele, VSS.  Q4 neuros- see flowsheets.  Full body tremors unrelieved by pain meds and muscle relaxant.  Slurred speech, clear at times. Stuttering when speaking.  Seen by PT/OT.  Orthostatics done with PT- see flowsheets/notes.  IV pain meds given x1.  PO benadryl given x1 for swollen tongue/throat per patient.  IVF TKO.  HD tomorrow, [facility] called.  Safety and comfort measures in place.  Patient updated on POC.

## 2024-11-07 NOTE — PLAN OF CARE
Assumed care at 2300.  Patient A&Ox4.  Tele SR/ST, on room air.  Cont to have slurred speech, expressive aphasia and body tremors. At times speech is clear.  HD completed 3L off.  Cont to c/o HA & left neck pain, Prn meds with some relief.  BP meds improved with PO meds post HD.  C/o chest tightness 7/10 & SOB, spO2 >95 on RA. EKG - unchanged, 2L NC for comfort. Hospitalist notified, chronic elevated trops, will re-eval in AM.  Patient aware of POC.

## 2024-11-07 NOTE — CONSULTS
[facility]   NEUROLOGY   CONSULT NOTE    Admission date: 11/6/2024  Reason for Consult: Expressive speech difficulty.  Chief Complaint: History of seizure.  Chief Complaint   Patient presents with    Stroke   ________________________________________________________________    History     History of Presenting Illness  44 year old male with multiple medical problems including coronary artery disease, arrhythmias, chronic kidney disease on dialysis, hypertension, diabetes, hyperlipidemia, valvular heart disease status post mitral valve repair consulted for strokelike symptoms.  Patient was admitted with multiple complaints including difficulty speaking, stuttering, slurring of speech for several days.  Patient has some residual right-sided weakness due to previous stroke.  Patient also has significant psychiatric disorder including previously diagnosed depression, anxiety, attention deficit hyperactivity disorder, bipolar disorder.  Patient currently lying comfortably in bed.  He is intermittently stuttering but does not seem to have significant expressive or receptive aphasia or word finding difficulties.  Denies any new weakness in the upper or lower extremities.  No evidence of jerky movements of the upper or lower extremity noted.    History obtained from patient and chart review.    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[facility])    Congestive heart disease (HCC)    COPD (chronic obstructive pulmonary disease) ([facility])    Coronary atherosclerosis    Deep vein thrombosis ([facility])    at age 17 R/T cast    Depression    Diabetes ([facility])    Dialysis patient ([facility])    Diverticulosis of large intestine    Essential hypertension    3/21 echo: severe concentric LVH with normal EF and  no MR or pHTN    Extrinsic asthma, unspecified    Heart attack (HCC)    2016- angiogram- no intervention    Heart valve disease    mitral valve repair in 1994/    High blood pressure    High cholesterol    History of blood transfusion    History of mitral valve repair    Hyperlipidemia    Low back pain    tight and stiff after sweeping and mopping    LVH (left ventricular hypertrophy)    Migraines    Mixed hyperlipidemia     HDL 38 LDL 97 VLDL 57     Monoclonal gammopathy    IgG kappa     Muscle weakness    MVP (mitral valve prolapse)    Repair 1994 at [place]; echoes as recently as 3/21 show mild or trivial MR and no stenosis    Neuropathy    Osteoarthritis    hip ,knees    Pneumonia due to organism    Pulmonary embolism (HCC)    Renal disorder    Stroke (HCC)    TIA (transient ischemic attack)    Initial history of left-sided weakness and slurred speech. (+) cocaine. MRI of the brain, CT angiogram of the head and neck, and 2D echo are all unremarkable.     TMJ (dislocation of temporomandibular joint)    Troponin level elevated    Trop 60 60 47 with TIA and no CP: Lexiscan negative with EF 51    Visual impairment     Past Surgical History:   Procedure Laterality Date    Av fistula revision, open Left     Cabg      Colonoscopy N/A 03/26/2023    Procedure: COLONOSCOPY;  Surgeon: Heath Vu MD;  Location:  ENDOSCOPY    Colonoscopy N/A 12/30/2023    Procedure: COLONOSCOPY with cold snare polypectomy and forcep polypectomy;  Surgeon: Ousmane Suarez MD;  Location:  ENDOSCOPY    Colonoscopy & polypectomy  2019    Egd  2019    Duodenitis. Biopsied. EUS for weight loss was negative    Heart surgery      Hernia surgery  08/17/2022    Dr Barnes    Laminectomy,>2 sgmt,lumbar  09/06/2018    L4-L5 Decomp Discectomy ROEM L4-L5    Mitralplasty w cp bypass  1994    [place]: Repair    Repair rotator cuff,chronic Left     torn and had a ruptured bicep    Sinus surgery        Spine surgery procedure unlisted       Valve repair      mitral valve     Social History     Socioeconomic History    Marital status:    Tobacco Use    Smoking status: Former     Current packs/day: 0.00     Average packs/day: 1 pack/day for 27.0 years (27.0 ttl pk-yrs)     Types: Cigarettes     Start date: 1995     Quit date: 2022     Years since quittin.6     Passive exposure: Never    Smokeless tobacco: Never   Vaping Use    Vaping status: Never Used   Substance and Sexual Activity    Alcohol use: No    Drug use: No     Social Drivers of Health     Financial Resource Strain: Medium Risk (2024)    Received from [facility]    Overall Financial Resource Strain (CARDIA)     Difficulty of Paying Living Expenses: Somewhat hard   Food Insecurity: No Food Insecurity (2024)    Food Insecurity     Food Insecurity: Never true   Transportation Needs: No Transportation Needs (2024)    Transportation Needs     Lack of Transportation: No   Physical Activity: Inactive (2024)    Received from [facility]    Exercise Vital Sign     Days of Exercise per Week: 0 days     Minutes of Exercise per Session: 0 min   Stress: Stress Concern Present (2024)    Received from [facility]    [employer] - Occupational Stress Questionnaire     Feeling of Stress : Rather much   Social Connections: Unknown (2024)    Received from [facility]    Social Connection and Isolation Panel [NHANES]     Frequency of Communication with Friends and Family: More than three times a week     Frequency of Social Gatherings with Friends and Family: More than three times a week     Attends Muslim Services: Never     Active Member of Clubs or Organizations: No     Attends Club or Organization Meetings: Never     Marital Status: Patient unable to answer   Housing Stability: Low Risk  (2024)    Housing Stability     Housing Instability: No     Family History   Problem Relation Age of  Onset    Hypertension Father     Alcohol and Other Disorders Associated Father     Substance Abuse Father         cocaine    Dementia Father     Cancer Father         lung    Diabetes Mother     Cancer Mother         multiple myeloma    Hypertension Mother     Anxiety Maternal Aunt     Depression Maternal Aunt     Anxiety Maternal Aunt     Depression Maternal Aunt     Bipolar Disorder Maternal Aunt     Diabetes Maternal Grandmother     Hypertension Maternal Grandmother     Cancer Maternal Grandfather         stomach cancer    Diabetes Maternal Grandfather     Hypertension Maternal Grandfather     Alcohol and Other Disorders Associated Maternal Grandfather     Hypertension Paternal Grandmother     Hypertension Paternal Grandfather     Cancer Sister         uterine and ovarian    Hypertension Sister     Cancer Maternal Uncle         lung    Cancer Paternal Aunt         throat     Allergies Allergies[1]    Home Meds  Current Outpatient Medications   Medication Instructions    albuterol 108 (90 Base) MCG/ACT Inhalation Aero Soln 2 puffs, Every 6 hours PRN    ARIPiprazole (ABILIFY) 15 mg, Daily    buPROPion ER (WELLBUTRIN XL) 150 mg, Daily    carvedilol (COREG) 25 mg, Oral, 2 times daily with meals    Fenofibrate 134 MG Oral Cap 1 capsule, Oral, Nightly    FLUoxetine HCl (PROZAC) 40 mg, Oral, Daily    Fluticasone Propionate 50 MCG/ACT Nasal Suspension SPRAY ONCE INTO EACH NOSTRIL BID PRN    hydrALAZINE (APRESOLINE) 50 mg, Oral, Every 8 hours scheduled    lamoTRIgine (LAMICTAL) 150 mg, Oral, Daily    levoFLOXacin 250 MG Oral Tab TAKE 2 TABLETs BY MOUTH one time then take 1 tablet every 48 hours for 30 days    Lisdexamfetamine Dimesylate (VYVANSE) 70 mg, Every morning    losartan (COZAAR) 100 mg, Daily    memantine (NAMENDA) 5 mg, 2 times daily    NIFEdipine ER (ADALAT CC) 60 mg, 2 times daily    RENVELA 800 MG Oral Tab 1 tablet, 3 times daily with meals    tamsulosin (FLOMAX) 0.4 mg, Daily    tiotropium (SPIRIVA  HANDIHALER) 18 mcg, Daily     Scheduled Meds:   aspirin  81 mg Oral Daily    losartan  100 mg Oral Daily    methocarbamol  500 mg Oral TID    sevelamer carbonate  1,600 mg Oral TID CC    [START ON 11/8/2024] heparin  1.5 mL Intracatheter Once    [START ON 11/8/2024] epoetin sylvia  5,000 Units Subcutaneous Once per day on Monday Wednesday Friday    heparin  1.5 mL Intracatheter Once    ARIPiprazole  15 mg Oral Daily    buPROPion ER  150 mg Oral Daily    carvedilol  25 mg Oral BID with meals    FLUoxetine HCl  40 mg Oral Daily    fluticasone propionate  2 spray Each Nare Daily    hydrALAZINE  50 mg Oral Q8H MARTHA    lamoTRIgine  150 mg Oral Daily    amphetamine-dextroamphetamine  15 mg Oral BID AC    memantine  5 mg Oral BID    NIFEdipine ER  60 mg Oral BID    tamsulosin  0.4 mg Oral Daily    heparin  5,000 Units Subcutaneous Q8H MARTHA     Continuous Infusions:  PRN Meds:  traMADol    diphenhydrAMINE    sodium chloride **AND** albumin human    albuterol    labetalol    acetaminophen **OR** [DISCONTINUED] HYDROcodone-acetaminophen **OR** [DISCONTINUED] HYDROcodone-acetaminophen    ondansetron    prochlorperazine    polyethylene glycol (PEG 3350)    sennosides    bisacodyl    HYDROmorphone **OR** HYDROmorphone **OR** HYDROmorphone    OBJECTIVE   VITAL SIGNS:   Temp:  [97.6 °F (36.4 °C)-98.9 °F (37.2 °C)] 98.5 °F (36.9 °C)  Pulse:  [] 98  Resp:  [18-29] 18  BP: ()/() 105/73  SpO2:  [92 %-100 %] 98 %    PHYSICAL EXAM:    NEUROLOGIC:    Mental Status: Awake, alert, oriented to person, place and time.  Follows simple commands, no obvious aphasia or dysarthria.  Stuttering noted.  Cranial nerves: PERRL.  Visual fields full.  EOMI. mild right nasolabial fold flattening.  Hearing grossly intact. Tongue midline with normal movements.   Motor: Right pronator drift; Motor exam is symmetrical and antigravity in all extremities bilaterally  Sensation: Intact to light touch bilaterally  Cerebellar: Normal Finger-To-Nose  test      LABORATORY DATA:  Last 24 hour labs were reviewed in detail.  Recent Labs   Lab 11/06/24  1206 11/07/24  0601    138   K 4.5 4.2    101   CO2 22.0 27.0   * 102*   BUN 78* 37*   CREATSERUM 10.50* 6.54*     Recent Labs   Lab 11/06/24  1206 11/07/24  0601   WBC 9.0 8.8   HGB 9.6* 9.8*   .0 216.0     Recent Labs   Lab 11/06/24  1206   ALT 21   AST 27     Recent Labs   Lab 11/07/24  0601   MG 1.9   PHOS 7.0*     Last A1c value was 4.6% done 3/8/2024.     Radiology:    MRI BRAIN (CPT=70551)    Result Date: 11/6/2024  CONCLUSION:  No abnormalities are seen.   LOCATION:  [id number]   Dictated by (CST): Joe London MD on 11/06/2024 at 3:44 PM     Finalized by (CST): Joe London MD on 11/06/2024 at 3:49 PM      ASSESSMENT/PLAN   44 year old male with:    Episode of stuttering/word finding difficulties/aphasia.  Symptoms noted to be intermittent while talking to the patient.  Findings likely to be due to nonneurological condition such as worsening of patient's psychiatry health.  Advised psychiatry consult for adjustment of medications/evaluations.  Concern for seizure.  Advise EEG for further evaluation.  History of previous stroke with residual right-sided weakness.  Continue home aspirin and fenofibrate.  Cognitive decline possibly related to pseudodementia.  Patient is being treated with memantine.  Advised to continue medication.      Principal Problem:    Expressive aphasia  Active Problems:    Elevated troponin    Uncontrolled hypertension       Emeka Hernandez MD  Neurohospitalist  [facility]    Disclaimer: This record was dictated using Dragon software. There may be errors due to voice recognition problems that were not realized and corrected during the completion of the note.           [1]   Allergies  Allergen Reactions    Hydrochlorothiazide RASH and HIVES

## 2024-11-07 NOTE — BH PROGRESS NOTE
[facility] SOAP Note    Godwin Fonseca Patient Status:  Inpatient    1980 MRN [id number]   [facility] 7NE-A Attending Vicky Weiss, DO   Hosp Day # 1 PCP Adrian Small MD       Writer met with patient at bedside for PHQ-4 follow-up.  Patient was seen on previous admission by writer.  Patient reports no significant change from prior PHQ follow-up.  Patient continues to report some increased depression but states that he has struggled with lower mood for a while. This is not new for patient.  Patient states he recently got connected with an outpatient therapist and is supposed to have an appointment on  but is not knowing if he will make appointment due to being in the hospital.  Patient is aware that he can reschedule.      Patient's PHQ-4 score equals 6.  Patient was alert and oriented x 4.  Patient was calm and cooperative.  Patient's thought process was linear and intact.  Patient's speech was slightly slurred at times. Patient did not display any symptoms of reymundo or hypomania.  No delusional thinking noted.  Patient participated in meeting with writer.    Patient admits to some depressive symptoms and identifies that this is something that he has been feeling for a while.  Patient aware of how he has been feeling and actively seeking treatment.  Patient appears to be appropriately pursuing treatment.  Denies any SI or HI.      Patient already has an outpatient therapist established and continues to deny any need for additional resources.  Patient has upcoming appointment with therapist.  Plan of action based on the above, no additional follow-up from psych liaison needed at this time.     Kita CORNELL LCSW  2024  12:35 PM

## 2024-11-07 NOTE — PHYSICAL THERAPY NOTE
PHYSICAL THERAPY EVALUATION - INPATIENT     Room Number: 7615/7615-A  Evaluation Date: 11/7/2024  Type of Evaluation: Initial  Physician Order: PT Eval and Treat    Presenting Problem: aphasia  Co-Morbidities : TIA, ESRD on HD, HFpEF, HTN, T2DM, CAD, COPD, PE  Reason for Therapy: Mobility Dysfunction and Discharge Planning    PHYSICAL THERAPY ASSESSMENT   Patient is a 44 year old male admitted 11/6/2024 for aphasia/slurred speech, tremors noted during session.  Prior to admission, patient's baseline is indep.  Patient is currently functioning below baseline with gait, stair negotiation, and standing prolonged periods.  Patient is requiring contact guard assist as a result of the following impairments: pain and tremors (inc during MMT however not present during weight bearing) and vision impairments reporting its \"wavy\" .  Physical Therapy will continue to follow for duration of hospitalization.    Patient will benefit from continued skilled PT Services at discharge to promote prior level of function.  Anticipate patient will return home with OP PT pending clinical course.     PLAN DURING HOSPITALIZATION  Nursing Mobility Recommendation : 1 Assist  PT Device Recommendation: Rolling walker  PT Treatment Plan: Bed mobility;Body mechanics;Coordination;Endurance;Energy conservation;Patient education;Family education;Gait training;Neuromuscular re-educate;Range of motion;Strengthening;Stoop training;Stair training;Transfer training;Balance training  Rehab Potential : Good  Frequency (Obs): 3-5x/week     CURRENT GOALS    Goal #1 Patient is able to demonstrate supine - sit EOB @ level: supervision     Goal #2 Patient is able to demonstrate transfers EOB to/from Chair/Wheelchair at assistance level: supervision     Goal #3 Patient is able to ambulate 150 feet with assist device:  LRAD  at assistance level: supervision     Goal #4 Patient will navigate stairs with rail and SBA   Goal #5    Goal #6    Goal Comments: Goals  established on 11/7/2024      PHYSICAL THERAPY MEDICAL/SOCIAL HISTORY  History related to current admission: Patient is a 44 year old male admitted on 11/6/2024: Presented with aphasia/slurred speech from outpatient neuro apt CT and MRI brain negative    Recent Admits  10/17-10/22/24: PNA, not seen by therapy  10/6-10/8/24: diverticulitis of oclon hemorrhage,not seen by therapy  9/29-10/1/24: HD catheter infection, no needs  9/15-9/20/24: CKD on chronic HD, no needs  8/30-9/2/24: hypervolemia, not seen by therapy  6/14-6/17/24: metabolic acidosis , not seen by therapy  5/14-5/17/24: PNA, not seen by therapy    HOME SITUATION  Type of Home: House  Home Layout: Two level  Stairs to Enter : 2                  Lives With: Other (Comment) (mother - pt is a CG for his mother, now his aunt and cousin are helping)    Drives: Yes   Patient Regularly Uses: Reading glasses (shower chair)      Prior Level of Gooding: Normally indep,denies any falls in the past 6 months,    SUBJECTIVE   tremors are the same but states speech is better, reports vision is wavy    OBJECTIVE  Precautions: Bed/chair alarm  Fall Risk: Standard fall risk    WEIGHT BEARING RESTRICTION     PAIN ASSESSMENT  Rating: Unable to rate  Location: back pain - chronic  Management Techniques: Activity promotion;Body mechanics;Repositioning    COGNITION  Overall Cognitive Status:  WFL - within functional limits    RANGE OF MOTION AND STRENGTH ASSESSMENT  Upper extremity ROM and strength are within functional limits     Lower extremity ROM is within functional limits     Lower extremity strength is within functional limits     BALANCE  Static Sitting: Good  Dynamic Sitting: Good  Static Standing: Fair -  Dynamic Standing: Fair -    ADDITIONAL TESTS  Additional Tests: Modified Todd              Modified Todd: 3                  ACTIVITY TOLERANCE           BP: 105/73  BP Location: Right arm  BP Method: Automatic  Patient Position: Sitting    O2 WALK        NEUROLOGICAL FINDINGS                        AM-PAC '6-Clicks' INPATIENT SHORT FORM - BASIC MOBILITY  How much difficulty does the patient currently have...  Patient Difficulty: Turning over in bed (including adjusting bedclothes, sheets and blankets)?: None   Patient Difficulty: Sitting down on and standing up from a chair with arms (e.g., wheelchair, bedside commode, etc.): None   Patient Difficulty: Moving from lying on back to sitting on the side of the bed?: A Little   How much help from another person does the patient currently need...   Help from Another: Moving to and from a bed to a chair (including a wheelchair)?: A Little   Help from Another: Need to walk in hospital room?: A Little   Help from Another: Climbing 3-5 steps with a railing?: A Little     AM-PAC Score:  Raw Score: 20   Approx Degree of Impairment: 35.83%   Standardized Score (AM-PAC Scale): 47.67   CMS Modifier (G-Code): CJ    FUNCTIONAL ABILITY STATUS  Gait Assessment   Functional Mobility/Gait Assessment  Gait Assistance: Contact guard assist  Distance (ft): 3  Assistive Device: Rolling walker  Pattern: Shuffle (minimal tremors with ambulation)    Skilled Therapy Provided     Bed Mobility:  Rolling: mod I   Supine to sit: mod I    Sit to supine: NT     Transfer Mobility:  Sit to stand: CGA   Stand to sit: CGA  Gait = CGA    Therapist's Comments: Discussed case with RN prior to session initiation. Pt agreeable to participation in therapy. Gait belt donned for out of bed mobility. Discussed benefits of use of RW at this time. Tremors actually diminished during standing and transfer to bedside chair. Limited out of bed mobility this session 2/2 drop in BP noted 103/90 in sitting and 90/68 in standing, SBP had been in upper 140s in supine.       Exercise/Education Provided:  Body mechanics  Functional activity tolerated  Transfer training    Patient End of Session: Up in chair;Call light within reach;Needs met;RN aware of  session/findings;All patient questions and concerns addressed;Hospital anti-slip socks;Alarm set      Patient Evaluation Complexity Level:  History Moderate - 1 or 2 personal factors and/or co-morbidities   Examination of body systems Moderate - addressing a total of 3 or more elements   Clinical Presentation  Moderate - Evolving   Clinical Decision Making Moderate Complexity       PT Session Time: 20 minutes  Gait Training:  minutes  Therapeutic Activity: 10 minutes  Neuromuscular Re-education:  minutes  Therapeutic Exercise:  minutes

## 2024-11-07 NOTE — CONSULTS
[facility]   part of [facility]    Report of Consultation    Godwin Fonseca Patient Status:  Inpatient    1980 MRN [id number]   Location [facility] 7NE-A Attending Vicky Weiss, DO   Hosp Day # 1 PCP Adrian Small MD       REASON FOR CONSULT:     ESRD    HISTORY OF PRESENT ILLNESS:     43 yo M with history of ESRD on iHD MWF via RIJ CVC, LUE AVF, hyperaldosteronism, DM, bipolar disorder, recurrent GI bleed, pneumonia recently presented with myoclonic movements, slurred speech and expressive aphasia that has progressively gotten worse over the past few days. He missed his outpatient HD yesterday while getting ER evaluation and was dialyzed after MRI brain was neg. He tolerated 3L UF. He has no leg swelling or shortness of breath. Still makes urine.     REVIEW OF SYSTEMS:     Please see HPI for pertinent positives. 10 point review of systems otherwise reviewed and negative.     HISTORY:     Past Medical History:    Anxiety state    Arrhythmia    Asthma ([facility])    Attention deficit hyperactivity disorder (ADHD)    Back problem    Bipolar 1 disorder ([facility])    CKD (chronic kidney disease) stage 3, GFR 30-59 ml/min ([facility])    Dr Meeks    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[facility])    Congestive heart disease ([facility])    COPD (chronic obstructive pulmonary disease) ([facility])    Coronary atherosclerosis    Deep vein thrombosis ([facility])    at age 17 R/T cast    Depression    Diabetes ([facility])    Dialysis patient ([facility])    Diverticulosis of large intestine    Essential hypertension    3/21 echo: severe concentric LVH with normal EF and no MR or pHTN    Extrinsic asthma, unspecified    Heart attack (HCC)    - angiogram- no intervention    Heart valve disease    mitral valve repair in /    High blood pressure    High cholesterol    History of blood transfusion    History of mitral valve repair    Hyperlipidemia    Low back pain    tight and stiff after sweeping and mopping    LVH (left ventricular hypertrophy)    Migraines     Mixed hyperlipidemia     HDL 38 LDL 97 VLDL 57     Monoclonal gammopathy    IgG kappa     Muscle weakness    MVP (mitral valve prolapse)    Repair 1994 at [place]; echoes as recently as 3/21 show mild or trivial MR and no stenosis    Neuropathy    Osteoarthritis    hip ,knees    Pneumonia due to organism    Pulmonary embolism (HCC)    Renal disorder    Stroke (HCC)    TIA (transient ischemic attack)    Initial history of left-sided weakness and slurred speech. (+) cocaine. MRI of the brain, CT angiogram of the head and neck, and 2D echo are all unremarkable.     TMJ (dislocation of temporomandibular joint)    Troponin level elevated    Trop 60 60 47 with TIA and no CP: Lexiscan negative with EF 51    Visual impairment     Past Surgical History:   Procedure Laterality Date    Av fistula revision, open Left     Cabg      Colonoscopy N/A 03/26/2023    Procedure: COLONOSCOPY;  Surgeon: Heath Vu MD;  Location:  ENDOSCOPY    Colonoscopy N/A 12/30/2023    Procedure: COLONOSCOPY with cold snare polypectomy and forcep polypectomy;  Surgeon: Ousmane Suarez MD;  Location:  ENDOSCOPY    Colonoscopy & polypectomy  2019    Egd  2019    Duodenitis. Biopsied. EUS for weight loss was negative    Heart surgery      Hernia surgery  08/17/2022    Dr Barnes    Laminectomy,>2 sgmt,lumbar  09/06/2018    L4-L5 Decomp Discectomy ROEM L4-L5    Mitralplasty w cp bypass  1994    [place]: Repair    Repair rotator cuff,chronic Left     torn and had a ruptured bicep    Sinus surgery        Spine surgery procedure unlisted      Valve repair  1994    mitral valve     Family History   Problem Relation Age of Onset    Hypertension Father     Alcohol and Other Disorders Associated Father     Substance Abuse Father         cocaine    Dementia Father     Cancer Father         lung    Diabetes Mother     Cancer Mother         multiple myeloma    Hypertension Mother     Anxiety Maternal Aunt     Depression Maternal Aunt      Anxiety Maternal Aunt     Depression Maternal Aunt     Bipolar Disorder Maternal Aunt     Diabetes Maternal Grandmother     Hypertension Maternal Grandmother     Cancer Maternal Grandfather         stomach cancer    Diabetes Maternal Grandfather     Hypertension Maternal Grandfather     Alcohol and Other Disorders Associated Maternal Grandfather     Hypertension Paternal Grandmother     Hypertension Paternal Grandfather     Cancer Sister         uterine and ovarian    Hypertension Sister     Cancer Maternal Uncle         lung    Cancer Paternal Aunt         throat      reports that he quit smoking about 2 years ago. His smoking use included cigarettes. He started smoking about 29 years ago. He has a 27 pack-year smoking history. He has never been exposed to tobacco smoke. He has never used smokeless tobacco. He reports that he does not drink alcohol and does not use drugs.    ALLERGIES:     Allergies[1]    MEDICATIONS:       Current Facility-Administered Medications:     aspirin chewable tab 81 mg, 81 mg, Oral, Daily    losartan (Cozaar) tab 100 mg, 100 mg, Oral, Daily    methocarbamol (Robaxin) tab 500 mg, 500 mg, Oral, TID    traMADol (Ultram) tab 50 mg, 50 mg, Oral, Q12H PRN    sevelamer carbonate (Renvela) tab 1,600 mg, 1,600 mg, Oral, TID CC    diphenhydrAMINE (Benadryl) cap/tab 25 mg, 25 mg, Oral, Q6H PRN    sodium chloride 0.9 % IV bolus 100 mL, 100 mL, Intravenous, Q30 Min PRN **AND** albumin human (Albumin) 25% injection 25 g, 25 g, Intravenous, PRN Dialysis    heparin (Porcine) 1000 UNIT/ML injection 1,500 Units, 1.5 mL, Intracatheter, Once    albuterol (Ventolin HFA) 108 (90 Base) MCG/ACT inhaler 2 puff, 2 puff, Inhalation, Q6H PRN    ARIPiprazole (Abilify) tab 15 mg, 15 mg, Oral, Daily    buPROPion ER (Wellbutrin XL) 24 hr tab 150 mg, 150 mg, Oral, Daily    carvedilol (Coreg) tab 25 mg, 25 mg, Oral, BID with meals    FLUoxetine (PROzac) cap 40 mg, 40 mg, Oral, Daily    fluticasone propionate (Flonase) 50  MCG/ACT nasal suspension 2 spray, 2 spray, Each Nare, Daily    hydrALAZINE (Apresoline) tab 50 mg, 50 mg, Oral, Q8H MARTHA    lamoTRIgine (LaMICtal) tab 150 mg, 150 mg, Oral, Daily    amphetamine-dextroamphetamine (Adderall) tab 15 mg, 15 mg, Oral, BID AC    memantine (Namenda) tab 5 mg, 5 mg, Oral, BID    NIFEdipine ER (Procardia-XL) 24 hr tab 60 mg, 60 mg, Oral, BID    tamsulosin (Flomax) cap 0.4 mg, 0.4 mg, Oral, Daily    labetalol (Trandate) 5 mg/mL injection 10 mg, 10 mg, Intravenous, Q4H PRN    heparin (Porcine) 5000 UNIT/ML injection 5,000 Units, 5,000 Units, Subcutaneous, Q8H MARTHA    acetaminophen (Tylenol) tab 650 mg, 650 mg, Oral, Q4H PRN **OR** [DISCONTINUED] HYDROcodone-acetaminophen (Norco) 5-325 MG per tab 1 tablet, 1 tablet, Oral, Q4H PRN **OR** [DISCONTINUED] HYDROcodone-acetaminophen (Norco) 5-325 MG per tab 2 tablet, 2 tablet, Oral, Q4H PRN    ondansetron (Zofran) 4 MG/2ML injection 4 mg, 4 mg, Intravenous, Q6H PRN    prochlorperazine (Compazine) 10 MG/2ML injection 5 mg, 5 mg, Intravenous, Q8H PRN    polyethylene glycol (PEG 3350) (Miralax) 17 g oral packet 17 g, 17 g, Oral, Daily PRN    sennosides (Senokot) tab 17.2 mg, 17.2 mg, Oral, Nightly PRN    bisacodyl (Dulcolax) 10 MG rectal suppository 10 mg, 10 mg, Rectal, Daily PRN    HYDROmorphone (Dilaudid) 1 MG/ML injection 0.2 mg, 0.2 mg, Intravenous, Q2H PRN **OR** HYDROmorphone (Dilaudid) 1 MG/ML injection 0.4 mg, 0.4 mg, Intravenous, Q2H PRN **OR** HYDROmorphone (Dilaudid) 1 MG/ML injection 0.8 mg, 0.8 mg, Intravenous, Q2H PRN  No current outpatient medications on file.         PHYSICAL EXAM:     Vital Signs: /73 (BP Location: Right arm)   Pulse 97   Temp 98.5 °F (36.9 °C) (Oral)   Resp 18   Ht 188 cm (6' 2\")   Wt 240 lb (108.9 kg)   SpO2 98%   BMI 30.81 kg/m²   Temp (24hrs), Av.5 °F (36.9 °C), Min:97.6 °F (36.4 °C), Max:98.9 °F (37.2 °C)       Intake/Output Summary (Last 24 hours) at 2024 1302  Last data filed at 2024  0356  Gross per 24 hour   Intake --   Output 500 ml   Net -500 ml     Wt Readings from Last 3 Encounters:   11/06/24 240 lb (108.9 kg)   10/24/24 240 lb (108.9 kg)   10/22/24 241 lb 2.9 oz (109.4 kg)     Gen - intermittent myoclonic jerking noted  HEENT - NCAT  CV - RRR  Resp - CTAB  Abd - soft   - no lobo  Ext - warm, no leg swelling  Vascular - RIJ tunnelled HD catheter, LUE AVF + thrill and bruit  MS - awake, alert, answering questions appropriately    LABORATORY DATA:       Lab Results   Component Value Date     (H) 11/07/2024    BUN 37 (H) 11/07/2024    BUNCREA 13.0 03/07/2022    CREATSERUM 6.54 (H) 11/07/2024    ANIONGAP 10 11/07/2024    GFR 59 (L) 01/04/2018    GFRNAA 30 (L) 07/12/2022    GFRAA 35 (L) 07/12/2022    CA 8.9 11/07/2024    OSMOCALC 295 11/07/2024    ALKPHO 85 11/06/2024    AST 27 11/06/2024    ALT 21 11/06/2024    BILT 0.5 11/06/2024    TP 7.2 11/06/2024    ALB 4.3 11/07/2024    GLOBULIN 3.7 11/06/2024     11/07/2024    K 4.2 11/07/2024     11/07/2024    CO2 27.0 11/07/2024     Lab Results   Component Value Date    WBC 8.8 11/07/2024    RBC 3.06 (L) 11/07/2024    HGB 9.8 (L) 11/07/2024    HCT 28.2 (L) 11/07/2024    .0 11/07/2024    MPV 11.5 12/14/2012    MCV 92.2 11/07/2024    MCH 32.0 11/07/2024    MCHC 34.8 11/07/2024    RDW 14.1 11/07/2024    NEPRELIM 6.32 11/07/2024    NEPERCENT 72.2 11/07/2024    LYPERCENT 12.6 11/07/2024    MOPERCENT 10.4 11/07/2024    EOPERCENT 3.4 11/07/2024    BAPERCENT 0.9 11/07/2024    NE 6.32 11/07/2024    LYMABS 1.10 11/07/2024    MOABSO 0.91 11/07/2024    EOABSO 0.30 11/07/2024    BAABSO 0.08 11/07/2024     Lab Results   Component Value Date    MALBP 167.00 05/24/2023    CREUR 142.00 05/24/2023    CREUR 142.00 05/24/2023     Lab Results   Component Value Date    COLORUR Colorless (A) 09/16/2024    CLARITY Clear 09/16/2024    SPECGRAVITY 1.012 09/16/2024    GLUUR Normal 09/16/2024    BILUR Negative 09/16/2024    KETUR Negative 09/16/2024     BLOODURINE 3+ (A) 09/16/2024    PHURINE 6.0 09/16/2024    PROUR 50 (A) 09/16/2024    UROBILINOGEN Normal 09/16/2024    NITRITE Negative 09/16/2024    LEUUR Negative 09/16/2024    WBCUR 1-5 09/16/2024    RBCUR 0-2 09/16/2024    EPIUR Few (A) 09/16/2024    BACUR None Seen 09/16/2024    CAOXUR Occasional (A) 04/20/2018    HYLUR Present (A) 05/14/2019         IMAGING:     Reviewed.      ASSESSMENT/PLAN:     45 yo M with history of ESRD on iHD MWF via RIJ CVC, LUE AVF, hyperaldosteronism, DM, bipolar disorder, recurrent GI bleed, pneumonia recently presented with myoclonic movements, slurred speech and expressive aphasia.    Nephrology consulted for ESRD management.    ESRD:  -- continue MWF HD schedule  -- avoid class 1 gadolinium agents    Anemia:  -- epo with HD tomorrow if BP ok    Hyperphosphatemia:  -- HD tomorrow  -- sevelamer 1600 TID AC  -- low phos diet    HTN:  -- coreg 25 mg BID, hydralazine 50 TID, losartan 100 mg/d, nifedipine XL 60 mg BID  -- imdur held    Myoclonus, expressive aphasia:  -- neurology consulted    Will follow.      Thank you for allowing me to participate in the care of your patient. Please do not hesitate to contact me with concerns or questions.    Lenka Bond MD  [facility]  [address] [place] [id number]    11/7/2024  1:02 PM         [1]   Allergies  Allergen Reactions    Hydrochlorothiazide RASH and HIVES

## 2024-11-08 ENCOUNTER — NURSE ONLY (OUTPATIENT)
Dept: ELECTROPHYSIOLOGY | Facility: HOSPITAL | Age: 44
End: 2024-11-08
Payer: MEDICARE

## 2024-11-08 PROBLEM — F31.32 BIPOLAR DISORDER WITH MODERATE DEPRESSION (HCC): Status: ACTIVE | Noted: 2024-11-08

## 2024-11-08 LAB
ALBUMIN SERPL-MCNC: 4.1 G/DL (ref 3.2–4.8)
ANION GAP SERPL CALC-SCNC: 11 MMOL/L (ref 0–18)
BASOPHILS # BLD AUTO: 0.07 X10(3) UL (ref 0–0.2)
BASOPHILS NFR BLD AUTO: 1.1 %
BUN BLD-MCNC: 48 MG/DL (ref 9–23)
CALCIUM BLD-MCNC: 8.8 MG/DL (ref 8.7–10.4)
CHLORIDE SERPL-SCNC: 102 MMOL/L (ref 98–112)
CO2 SERPL-SCNC: 26 MMOL/L (ref 21–32)
CREAT BLD-MCNC: 8.55 MG/DL
EGFRCR SERPLBLD CKD-EPI 2021: 7 ML/MIN/1.73M2 (ref 60–?)
EOSINOPHIL # BLD AUTO: 0.37 X10(3) UL (ref 0–0.7)
EOSINOPHIL NFR BLD AUTO: 5.8 %
ERYTHROCYTE [DISTWIDTH] IN BLOOD BY AUTOMATED COUNT: 13.7 %
GLUCOSE BLD-MCNC: 109 MG/DL (ref 70–99)
GLUCOSE BLD-MCNC: 117 MG/DL (ref 70–99)
GLUCOSE BLD-MCNC: 121 MG/DL (ref 70–99)
GLUCOSE BLD-MCNC: 130 MG/DL (ref 70–99)
GLUCOSE BLD-MCNC: 183 MG/DL (ref 70–99)
HCT VFR BLD AUTO: 29.4 %
HGB BLD-MCNC: 9.8 G/DL
IMM GRANULOCYTES # BLD AUTO: 0.01 X10(3) UL (ref 0–1)
IMM GRANULOCYTES NFR BLD: 0.2 %
LYMPHOCYTES # BLD AUTO: 1.14 X10(3) UL (ref 1–4)
LYMPHOCYTES NFR BLD AUTO: 17.7 %
MAGNESIUM SERPL-MCNC: 2 MG/DL (ref 1.6–2.6)
MCH RBC QN AUTO: 32.1 PG (ref 26–34)
MCHC RBC AUTO-ENTMCNC: 33.3 G/DL (ref 31–37)
MCV RBC AUTO: 96.4 FL
MONOCYTES # BLD AUTO: 0.78 X10(3) UL (ref 0.1–1)
MONOCYTES NFR BLD AUTO: 12.1 %
NEUTROPHILS # BLD AUTO: 4.06 X10 (3) UL (ref 1.5–7.7)
NEUTROPHILS # BLD AUTO: 4.06 X10(3) UL (ref 1.5–7.7)
NEUTROPHILS NFR BLD AUTO: 63.1 %
OSMOLALITY SERPL CALC.SUM OF ELEC: 301 MOSM/KG (ref 275–295)
PHOSPHATE SERPL-MCNC: 8 MG/DL (ref 2.4–5.1)
PLATELET # BLD AUTO: 222 10(3)UL (ref 150–450)
PLATELETS.RETICULATED NFR BLD AUTO: 2.7 % (ref 0–7)
POTASSIUM SERPL-SCNC: 4.6 MMOL/L (ref 3.5–5.1)
RBC # BLD AUTO: 3.05 X10(6)UL
SODIUM SERPL-SCNC: 139 MMOL/L (ref 136–145)
WBC # BLD AUTO: 6.4 X10(3) UL (ref 4–11)

## 2024-11-08 PROCEDURE — 90792 PSYCH DIAG EVAL W/MED SRVCS: CPT | Performed by: OTHER

## 2024-11-08 PROCEDURE — 95816 EEG AWAKE AND DROWSY: CPT | Performed by: OTHER

## 2024-11-08 PROCEDURE — 99233 SBSQ HOSP IP/OBS HIGH 50: CPT | Performed by: OTHER

## 2024-11-08 RX ORDER — SEVELAMER CARBONATE 800 MG/1
2400 TABLET, FILM COATED ORAL
Status: DISCONTINUED | OUTPATIENT
Start: 2024-11-08 | End: 2024-11-09

## 2024-11-08 NOTE — PLAN OF CARE
Patient ID: Aspen is a 46 year old female.    Chief Complaint   Patient presents with   • Follow-up     bp follow up, increased propranolol for a couple of days then went back down to previous dose  Chest pain follow up, now resolved. Feeling back to normal. Thinks BP was elevated due to stress.   No orthopnea or PND  Nocturia 2-3 times a night last 2-3 years    Wearing support stocking on left, told she has incompetent vein and has to go to surgeon.         Review of Systems   Constitutional: Negative for fatigue and unexpected weight change.   Respiratory: Negative for shortness of breath.    Cardiovascular: Negative for chest pain and leg swelling.   Neurological: Negative for dizziness, light-headedness and headaches.       Visit Vitals  /76   Pulse 62   Temp 98.2 °F (36.8 °C) (Oral)   Resp 14   Ht 5' 5\" (1.651 m)   Wt 93.4 kg (206 lb)   LMP  (LMP Unknown)   BMI 34.28 kg/m²          Physical Exam  Vitals and nursing note reviewed.   Constitutional:       Appearance: Normal appearance.   Cardiovascular:      Rate and Rhythm: Normal rate and regular rhythm.      Pulses: Normal pulses.      Heart sounds: Normal heart sounds. No murmur heard.  Pulmonary:      Effort: Pulmonary effort is normal.      Breath sounds: Normal breath sounds.   Chest:      Chest wall: No tenderness.   Musculoskeletal:      Right lower leg: No edema.      Left lower leg: No edema.   Skin:     General: Skin is warm and dry.   Neurological:      Mental Status: She is alert and oriented to person, place, and time.   Psychiatric:         Mood and Affect: Mood normal.         Behavior: Behavior normal.         Thought Content: Thought content normal.         Judgment: Judgment normal.             Assessment   Problem List Items Addressed This Visit        Cardiac and Vasculature    Primary hypertension     Well controlled on current meds without increasing  To check bp in the evening to see if medication lasts until that time  Follow up in  Assumed pt care @ 1930   Pt A/Ox4, NSR on tele, & on RA  Neuro checks Q4H; see flowsheets  Pt reporting neck & back pain, managed w/prns & heat packs  Comfort & Safety precautions maintained  Pt updated on plan of care      3 months           Relevant Medications    propRANolol (INDERAL) 10 MG tablet      Other Visit Diagnoses     Frequency of micturition    -  Primary    Relevant Orders    SERVICE TO URODYNAMICS OR URO GYN               Health Maintenance Summary     Colorectal Cancer Screen- (Colonoscopy - Every 10 Years)  Ordered on 7/11/2022    COVID-19 Vaccine (2 - Booster for Adela series)  Overdue since 5/25/2022    Influenza Vaccine (1)  Next due on 9/1/2022    Breast Cancer Screening (Yearly)  Next due on 4/12/2023    Depression Screening (Yearly)  Next due on 8/1/2023    DTaP/Tdap/Td Vaccine (2 - Td or Tdap)  Next due on 7/8/2026    Cervical Cancer Screen 30-64 - (PAP/HPV Co-Testing - Every 5 Years)  Next due on 5/3/2027    Hepatitis B Vaccine   Aged Out    Meningococcal Vaccine   Aged Out    HPV Vaccine   Aged Out    Pneumococcal Vaccine 0-64   Aged Out          Schedule follow up: in 3 months    Marni H Goldberg, MD

## 2024-11-08 NOTE — PHYSICAL THERAPY NOTE
PHYSICAL THERAPY TREATMENT NOTE - INPATIENT    Room Number: 7615/7615-A     Session: 1     Number of Visits to Meet Established Goals: 5    Presenting Problem: aphasia  Co-Morbidities : Chronic GIB, pt reports C5-6 compression fx, CKD on HD, DM2, HTN, CAD, COPD, bipolar d/o, ADD, neuropathy, anxiety, depression, PE    PHYSICAL THERAPY ASSESSMENT   Patient demonstrates good  progress this session, goals  updated to reflect patient performance.        Patient is requiring stand-by assist and contact guard assist as a result of the following impairments: medical status. No tingling, no facial droop or aphasia noted. Patient didn't exhibit any focal muscle weakness during session. Cga provided for safety of pt due to his c/o being extremely tired. Warm pack provided for his cervical spine pain.     Patient continues to function near baseline with bed mobility, transfers, and gait.  Next session anticipate patient to progress gait and stair negotiation.  Physical Therapy will continue to follow patient for duration of hospitalization.    Patient continues to benefit from continued skilled PT services: at discharge to promote prior level of function and safety with additional support and return home with OP PT.    PLAN DURING HOSPITALIZATION  Nursing Mobility Recommendation : 1 Assist  PT Device Recommendation: Rolling walker  PT Treatment Plan: Bed mobility;Body mechanics;Coordination;Endurance;Energy conservation;Patient education;Family education;Gait training;Neuromuscular re-educate;Range of motion;Strengthening;Stoop training;Stair training;Transfer training;Balance training  Frequency (Obs): 3-5x/week     CURRENT GOALS     Goal #1 Patient is able to demonstrate supine - sit EOB @ level: supervision  Met      Goal #2 Patient is able to demonstrate transfers EOB to/from Chair/Wheelchair at assistance level: supervision  Met      Goal #3 Patient is able to ambulate 150 feet with assist device: LRAD at assistance  level: supervision     Goal #4 Patient will navigate stairs with rail and SBA   Goal #5    Goal #6    Goal Comments: Goals established on 11/7/2024 11/8/2024 see above for progress.    SUBJECTIVE  \" I am just very tired.\"    OBJECTIVE  Precautions: Bed/chair alarm    WEIGHT BEARING RESTRICTION     PAIN ASSESSMENT   Rating: Unable to rate  Location: neck pain  Management Techniques: Activity promotion;Body mechanics;Repositioning    BALANCE                                                                                                                       Static Sitting: Good  Dynamic Sitting: Good           Static Standing: Fair -  Dynamic Standing: Fair -    ACTIVITY TOLERANCE                         O2 WALK       AM-PAC '6-Clicks' INPATIENT SHORT FORM - BASIC MOBILITY  How much difficulty does the patient currently have...  Patient Difficulty: Turning over in bed (including adjusting bedclothes, sheets and blankets)?: None   Patient Difficulty: Sitting down on and standing up from a chair with arms (e.g., wheelchair, bedside commode, etc.): None   Patient Difficulty: Moving from lying on back to sitting on the side of the bed?: None   How much help from another person does the patient currently need...   Help from Another: Moving to and from a bed to a chair (including a wheelchair)?: None   Help from Another: Need to walk in hospital room?: A Little   Help from Another: Climbing 3-5 steps with a railing?: A Little     AM-PAC Score:  Raw Score: 22   Approx Degree of Impairment: 20.91%   Standardized Score (AM-PAC Scale): 53.28   CMS Modifier (G-Code): CJ    FUNCTIONAL ABILITY STATUS  Gait Assessment   Functional Mobility/Gait Assessment  Gait Assistance: Contact guard assist (cga to sba)  Distance (ft): 250  Assistive Device: Rolling walker  Pattern: Within Functional Limits    Skilled Therapy Provided    Bed Mobility:  Rolling: mod ind   Supine<>Sit: mod ind   Sit<>Supine: mod ind     Transfer  Mobility:  Sit<>Stand: sup   Stand<>Sit: sup   Gait: SBA to cga with RW    Therapist's Comments: pt declined stair training due to being tired. Performed miguel a le exercises in sitting and standing.       THERAPEUTIC EXERCISES  Lower Extremity Alternating marching  Ankle pumps  Heel raises  LAQ  Marching and heel raises     Upper Extremity      Position Sitting & Standing     Repetitions   12   Sets   1     Patient End of Session: Up in chair;Call light within reach;Needs met;Alarm set    PT Session Time: 23 minutes  Gait Training: 15 minutes  Therapeutic Activity: 8 minutes  Therapeutic Exercise:  minutes   Neuromuscular Re-education:  minutes
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
## 2024-11-08 NOTE — PROGRESS NOTES
[facility]   part of [facility]    Nephrology Progress Note    Godwin Fonseca Attending:  Vicky Weiss DO       SUBJECTIVE:     Walking in hallway with PT. Denies more shaking movements.    PHYSICAL EXAM:     Vital Signs: /75 (BP Location: Right arm)   Pulse 100   Temp 97.4 °F (36.3 °C) (Oral)   Resp (!) 37   Ht 188 cm (6' 2\")   Wt 240 lb (108.9 kg)   SpO2 (!) 79%   BMI 30.81 kg/m²   Temp (24hrs), Av.7 °F (36.5 °C), Min:97.4 °F (36.3 °C), Max:98.2 °F (36.8 °C)       Intake/Output Summary (Last 24 hours) at 2024 1240  Last data filed at 2024 0800  Gross per 24 hour   Intake 480 ml   Output 720 ml   Net -240 ml     Wt Readings from Last 3 Encounters:   24 240 lb (108.9 kg)   10/24/24 240 lb (108.9 kg)   10/22/24 241 lb 2.9 oz (109.4 kg)       General: pleasant, well appearing, walking well  Skin: no visible rashes  HEENT: NCAT  Lungs: nonlabored with ambulation  Abdomen: nondistended  Extremities: no leg edema  Neurologic/Psych: mentating well  LUE AVF + thrill       LABORATORY DATA:     Lab Results   Component Value Date     (H) 2024    BUN 48 (H) 2024    BUNCREA 13.0 2022    CREATSERUM 8.55 (H) 2024    ANIONGAP 11 2024    GFR 59 (L) 2018    GFRNAA 30 (L) 2022    GFRAA 35 (L) 2022    CA 8.8 2024    OSMOCALC 301 (H) 2024    ALKPHO 85 2024    AST 27 2024    ALT 21 2024    BILT 0.5 2024    TP 7.2 2024    ALB 4.1 2024    GLOBULIN 3.7 2024     2024    K 4.6 2024     2024    CO2 26.0 2024     Lab Results   Component Value Date    WBC 6.4 2024    RBC 3.05 (L) 2024    HGB 9.8 (L) 2024    HCT 29.4 (L) 2024    .0 2024    MPV 11.5 2012    MCV 96.4 2024    MCH 32.1 2024    MCHC 33.3 2024    RDW 13.7 2024    NEPRELIM 4.06 2024    NEPERCENT 63.1 2024    LYPERCENT  17.7 11/08/2024    MOPERCENT 12.1 11/08/2024    EOPERCENT 5.8 11/08/2024    BAPERCENT 1.1 11/08/2024    NE 4.06 11/08/2024    LYMABS 1.14 11/08/2024    MOABSO 0.78 11/08/2024    EOABSO 0.37 11/08/2024    BAABSO 0.07 11/08/2024     Lab Results   Component Value Date    MALBP 167.00 05/24/2023    CREUR 142.00 05/24/2023    CREUR 142.00 05/24/2023     Lab Results   Component Value Date    COLORUR Colorless (A) 09/16/2024    CLARITY Clear 09/16/2024    SPECGRAVITY 1.012 09/16/2024    GLUUR Normal 09/16/2024    BILUR Negative 09/16/2024    KETUR Negative 09/16/2024    BLOODURINE 3+ (A) 09/16/2024    PHURINE 6.0 09/16/2024    PROUR 50 (A) 09/16/2024    UROBILINOGEN Normal 09/16/2024    NITRITE Negative 09/16/2024    LEUUR Negative 09/16/2024    WBCUR 1-5 09/16/2024    RBCUR 0-2 09/16/2024    EPIUR Few (A) 09/16/2024    BACUR None Seen 09/16/2024    CAOXUR Occasional (A) 04/20/2018    HYLUR Present (A) 05/14/2019         IMAGING:     Reviewed.    MEDICATIONS:       Current Facility-Administered Medications   Medication Dose Route Frequency    sevelamer carbonate (Renvela) tab 2,400 mg  2,400 mg Oral TID CC    aspirin chewable tab 81 mg  81 mg Oral Daily    losartan (Cozaar) tab 100 mg  100 mg Oral Daily    methocarbamol (Robaxin) tab 500 mg  500 mg Oral TID    traMADol (Ultram) tab 50 mg  50 mg Oral Q12H PRN    diphenhydrAMINE (Benadryl) cap/tab 25 mg  25 mg Oral Q6H PRN    sodium chloride 0.9 % IV bolus 100 mL  100 mL Intravenous Q30 Min PRN    And    albumin human (Albumin) 25% injection 25 g  25 g Intravenous PRN Dialysis    heparin (Porcine) 1000 UNIT/ML injection 1,500 Units  1.5 mL Intracatheter Once    epoetin sylvia (Epogen, Procrit) 5,000 Units injection  5,000 Units Subcutaneous Once per day on Monday Wednesday Friday    heparin (Porcine) 1000 UNIT/ML injection 1,500 Units  1.5 mL Intracatheter Once    albuterol (Ventolin HFA) 108 (90 Base) MCG/ACT inhaler 2 puff  2 puff Inhalation Q6H PRN    ARIPiprazole (Abilify)  tab 15 mg  15 mg Oral Daily    buPROPion ER (Wellbutrin XL) 24 hr tab 150 mg  150 mg Oral Daily    carvedilol (Coreg) tab 25 mg  25 mg Oral BID with meals    FLUoxetine (PROzac) cap 40 mg  40 mg Oral Daily    fluticasone propionate (Flonase) 50 MCG/ACT nasal suspension 2 spray  2 spray Each Nare Daily    hydrALAZINE (Apresoline) tab 50 mg  50 mg Oral Q8H MARTHA    lamoTRIgine (LaMICtal) tab 150 mg  150 mg Oral Daily    amphetamine-dextroamphetamine (Adderall) tab 15 mg  15 mg Oral BID AC    memantine (Namenda) tab 5 mg  5 mg Oral BID    NIFEdipine ER (Procardia-XL) 24 hr tab 60 mg  60 mg Oral BID    tamsulosin (Flomax) cap 0.4 mg  0.4 mg Oral Daily    labetalol (Trandate) 5 mg/mL injection 10 mg  10 mg Intravenous Q4H PRN    heparin (Porcine) 5000 UNIT/ML injection 5,000 Units  5,000 Units Subcutaneous Q8H MARTHA    acetaminophen (Tylenol) tab 650 mg  650 mg Oral Q4H PRN    ondansetron (Zofran) 4 MG/2ML injection 4 mg  4 mg Intravenous Q6H PRN    prochlorperazine (Compazine) 10 MG/2ML injection 5 mg  5 mg Intravenous Q8H PRN    polyethylene glycol (PEG 3350) (Miralax) 17 g oral packet 17 g  17 g Oral Daily PRN    sennosides (Senokot) tab 17.2 mg  17.2 mg Oral Nightly PRN    bisacodyl (Dulcolax) 10 MG rectal suppository 10 mg  10 mg Rectal Daily PRN    HYDROmorphone (Dilaudid) 1 MG/ML injection 0.2 mg  0.2 mg Intravenous Q2H PRN    Or    HYDROmorphone (Dilaudid) 1 MG/ML injection 0.4 mg  0.4 mg Intravenous Q2H PRN    Or    HYDROmorphone (Dilaudid) 1 MG/ML injection 0.8 mg  0.8 mg Intravenous Q2H PRN       ASSESSMENT/PLAN:     45 yo M with history of ESRD on iHD MWF via RIJ CVC, LUE AVF, hyperaldosteronism, DM, bipolar disorder, recurrent GI bleed, pneumonia recently presented with myoclonic movements, slurred speech and expressive aphasia.     Nephrology consulted for ESRD management.     ESRD:  -- continue MWF HD schedule  -- avoid class 1 gadolinium agents     Anemia:  -- epo with HD today     Hyperphosphatemia:  -- HD  today  -- sevelamer 2400 TID AC  -- low phos diet - decrease nuts     HTN:  -- coreg 25 mg BID, hydralazine 50 TID, losartan 100 mg/d, nifedipine XL 60 mg BID  -- imdur held     Myoclonus, expressive aphasia:  -- neurology consulted     Will follow.        Thank you for allowing me to participate in the care of this patient. Please do not hesitate to call with questions or concerns.        Lenka Bond MD  [facility]  [address] [place] [id number]    11/8/2024  12:40 PM

## 2024-11-08 NOTE — PROGRESS NOTES
[facility]  [facility] Neurology Progress Note    Godwin Fonseca Patient Status:  Inpatient    1980 MRN [id number]   Location [facility] 7NE-A Attending Vicky Weiss, DO   Hosp Day # 2 PCP Adrian Small MD     CC: Expressive speech difficulties    Subjective: Godwin Fonseca is a 44 years old male with multiple medical problems including coronary artery disease, arrhythmias, chronic kidney disease on dialysis, hypertension, diabetes, hyperlipidemia, valvular heart disease status post mitral valve repair consulted for strokelike symptoms.  Patient was admitted with multiple complaints including difficulty speaking, stuttering, slurring of speech for several days.  Patient has some residual right-sided weakness due to previous stroke.  Patient also has significant psychiatric disorder including previously diagnosed depression, anxiety, attention deficit hyperactivity disorder, bipolar disorder. Neurology was consulted for further investigations, EEG ordered.      Patient seen for a follow up visit today. In the chair, awake, alert and oriented x 4. Denies any new weakness or numbness. Still with some speech difficulties, stuttering. Reports mildly improved. No evidence of seizures or seizure like activity. Mild tremors in UEs noted.         MEDICATIONS:  No current outpatient medications on file.     Current Facility-Administered Medications   Medication Dose Route Frequency    sevelamer carbonate (Renvela) tab 2,400 mg  2,400 mg Oral TID CC    aspirin chewable tab 81 mg  81 mg Oral Daily    losartan (Cozaar) tab 100 mg  100 mg Oral Daily    methocarbamol (Robaxin) tab 500 mg  500 mg Oral TID    traMADol (Ultram) tab 50 mg  50 mg Oral Q12H PRN    diphenhydrAMINE (Benadryl) cap/tab 25 mg  25 mg Oral Q6H PRN    sodium chloride 0.9 % IV bolus 100 mL  100 mL Intravenous Q30 Min PRN    And    albumin human (Albumin) 25% injection 25 g  25 g Intravenous PRN Dialysis    heparin (Porcine) 1000 UNIT/ML injection  1,500 Units  1.5 mL Intracatheter Once    epoetin sylvia (Epogen, Procrit) 5,000 Units injection  5,000 Units Subcutaneous Once per day on Monday Wednesday Friday    heparin (Porcine) 1000 UNIT/ML injection 1,500 Units  1.5 mL Intracatheter Once    albuterol (Ventolin HFA) 108 (90 Base) MCG/ACT inhaler 2 puff  2 puff Inhalation Q6H PRN    ARIPiprazole (Abilify) tab 15 mg  15 mg Oral Daily    buPROPion ER (Wellbutrin XL) 24 hr tab 150 mg  150 mg Oral Daily    carvedilol (Coreg) tab 25 mg  25 mg Oral BID with meals    FLUoxetine (PROzac) cap 40 mg  40 mg Oral Daily    fluticasone propionate (Flonase) 50 MCG/ACT nasal suspension 2 spray  2 spray Each Nare Daily    hydrALAZINE (Apresoline) tab 50 mg  50 mg Oral Q8H MARTHA    lamoTRIgine (LaMICtal) tab 150 mg  150 mg Oral Daily    amphetamine-dextroamphetamine (Adderall) tab 15 mg  15 mg Oral BID AC    memantine (Namenda) tab 5 mg  5 mg Oral BID    NIFEdipine ER (Procardia-XL) 24 hr tab 60 mg  60 mg Oral BID    tamsulosin (Flomax) cap 0.4 mg  0.4 mg Oral Daily    labetalol (Trandate) 5 mg/mL injection 10 mg  10 mg Intravenous Q4H PRN    heparin (Porcine) 5000 UNIT/ML injection 5,000 Units  5,000 Units Subcutaneous Q8H MARTHA    acetaminophen (Tylenol) tab 650 mg  650 mg Oral Q4H PRN    ondansetron (Zofran) 4 MG/2ML injection 4 mg  4 mg Intravenous Q6H PRN    prochlorperazine (Compazine) 10 MG/2ML injection 5 mg  5 mg Intravenous Q8H PRN    polyethylene glycol (PEG 3350) (Miralax) 17 g oral packet 17 g  17 g Oral Daily PRN    sennosides (Senokot) tab 17.2 mg  17.2 mg Oral Nightly PRN    bisacodyl (Dulcolax) 10 MG rectal suppository 10 mg  10 mg Rectal Daily PRN    HYDROmorphone (Dilaudid) 1 MG/ML injection 0.2 mg  0.2 mg Intravenous Q2H PRN    Or    HYDROmorphone (Dilaudid) 1 MG/ML injection 0.4 mg  0.4 mg Intravenous Q2H PRN    Or    HYDROmorphone (Dilaudid) 1 MG/ML injection 0.8 mg  0.8 mg Intravenous Q2H PRN       REVIEW OF SYSTEMS:  A 10-point system was reviewed.  Pertinent  positives and negatives are noted in HPI.      PHYSICAL EXAMINATION:  VITAL SIGNS: /83 (BP Location: Right arm)   Pulse 98   Temp 97.6 °F (36.4 °C) (Oral)   Resp 20   Ht 74\"   Wt 240 lb (108.9 kg)   SpO2 98%   BMI 30.81 kg/m²   GENERAL:  Patient is a 44 year old male in no acute distress.  HEENT:  Normocephalic, atraumatic  ABD: Soft, non tender  SKIN: Warm, dry, no rashes    NEUROLOGICAL:   Mental status: Oriented to person, place, situation and time   Speech: mild to moderate stuttering noted, no obvious receptive aphasia  Memory and comprehension: Intact   Cranial Nerves: PERRL 3mm brisk, EOMI, no nystagmus, facial sensation intact, mild right nasolabial fold flattening noted,  tongue midline, shoulder shrug equal, remainder CN intact  Motor: Right pronator drift, no focal arm or leg weakness, mild tremoring noted R>L  Sensory: Intact to light touch bilaterally  Coordination: FTN intact  Gait: Deferred      Imaging/Diagnostics:  MRI BRAIN (CPT=70551)    Result Date: 11/6/2024  CONCLUSION:  No abnormalities are seen.   LOCATION:  [id number]   Dictated by (CST): Joe London MD on 11/06/2024 at 3:44 PM     Finalized by (CST): Joe London MD on 11/06/2024 at 3:49 PM       CT BRAIN OR HEAD (CPT=70450)    Result Date: 11/6/2024  CONCLUSION:  Motion artifact slightly limits assessment.  Within the limitations of the exam there is no significant midline shift or mass effect.  No acute intracranial hemorrhage.  If there is persistent clinical concern then consider MRI.     LOCATION:  [facility]   Dictated by (CST): Smith Randle MD on 11/06/2024 at 1:25 PM     Finalized by (CST): Smith Randle MD on 11/06/2024 at 1:29 PM       XR CHEST AP PORTABLE  (CPT=71045)    Result Date: 11/6/2024  CONCLUSION:  Decreased right basilar pneumonia.  Small right pleural effusion.   LOCATION:  [id number]      Dictated by (CST): Ricki Zaragoza MD on 11/06/2024 at 1:01 PM     Finalized by (CST): Ricki Zaragoza MD on 11/06/2024 at 1:02 PM           Labs:  Recent Labs   Lab 11/06/24  1206 11/07/24  0601 11/08/24  0543   RBC 2.90* 3.06* 3.05*   HGB 9.6* 9.8* 9.8*   HCT 28.1* 28.2* 29.4*   MCV 96.9 92.2 96.4   MCH 33.1 32.0 32.1   MCHC 34.2 34.8 33.3   RDW 13.9 14.1 13.7   NEPRELIM 6.36 6.32 4.06   WBC 9.0 8.8 6.4   .0 216.0 222.0         Recent Labs   Lab 11/06/24  1206 11/07/24  0601 11/08/24  0543   * 102* 109*   BUN 78* 37* 48*   CREATSERUM 10.50* 6.54* 8.55*   EGFRCR 6* 10* 7*   CA 8.5 8.9 8.8    138 139   K 4.5 4.2 4.6    101 102   CO2 22.0 27.0 26.0       Pre-morbid mRS 0-1      Assessment and Plan:    A 44 year old male with:     Episodes of stuttering/word finding difficulties/aphasia. Symptoms noted to be intermittent while talking to the patient. Findings likely to be due to nonneurological condition such as worsening of patient's psychiatry health.  Advised psychiatry consult for adjustment of medications/evaluations.  Concern for possible seizure activity -  EEG for further evaluation - pending  History of previous stroke with residual right-sided weakness. Continue home aspirin and fenofibrate.  Cognitive decline possibly related to pseudodementia. Patient is being treated with memantine. Advised to continue medication.  Discussed with patient, all questions answered. Discussed with dr. Hernandez. To follow with further recommendations if indicated.   Is this a shared or split note between Advanced Practice Provider and Physician? Yes       Iza QUEZADA  [facility]  11/8/2024, 1:33 PM   [place] # [id number]      Impression/plan/MDM:  Patient seen and examined personally.  Investigations reviewed.    Episode of stuttering/word finding difficulties/aphasia.  Symptoms noted to be intermittent while talking to the patient.  Findings likely to be due to nonneurological condition.  Patient has history of psychiatric disorder.  Advised psychiatry consult.    Concern for seizure.  EEG reported mild  slowing.  No epileptiform activity noted.  Continue seizure precaution.  History of previous stroke with residual right-sided weakness.  Continue home aspirin and fenofibrate.  Cognitive decline possibly related to pseudodementia.  Patient is being treated with memantine.  Advised to continue medication.  No further active neuro intervention at this time.  Please feel free to call with further queries.

## 2024-11-08 NOTE — PAYOR COMM NOTE
--------------  ADMISSION REVIEW     Payor: [employer]  Subscriber #:  [id number]  Authorization Number: [id number]    Admit date: 11/6/24  Admit time:  5:32 PM       REVIEW DOCUMENTATION:    Patient Seen in: [facility] [place]      History     Chief Complaint   Patient presents with    Stroke     Stated Complaint: sob, dizziness, slurring    Subjective:   HPI      The patient is a 44-year-old male presents emergency room with history of multiple complaints.  The patient has a history of previous TIA history of hypertension, end-stage renal disease, anxiety, and coronary atherosclerosis who states he was at his neurologist office today and has been having some slurring of his speech and difficulty finding words which has been ongoing over the last several days.  The patient reportedly has some residual right-sided weakness from his stroke/TIA that he had several years ago.  The patient has chronic neck pain which is unchanged today from previous.  Patient did have an MRI of the cervical spine done approximately 2 and half weeks ago which showed degenerative changes as per review of medical records.  The patient denies headache.  The patient states he is dialyzed Monday Wednesday Friday he was due for dialysis today but did not get dialyzed.  Patient states that the patient states that he has no history of any new weakness or numbness to his arms or legs.  The patient denies history of any other somatic complaints or discomfort at this time.    Objective:     Past Medical History:    Anxiety state    Arrhythmia    Asthma (Formerly [facility])    Attention deficit hyperactivity disorder (ADHD)    Back problem    Bipolar 1 disorder (Formerly [facility])    CKD (chronic kidney disease) stage 3, GFR 30-59 ml/min (Formerly [facility])    Dr Meeks    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Formerly [facility])    Congestive heart disease (Formerly [facility])    COPD (chronic obstructive pulmonary disease) (Formerly [facility])    Coronary atherosclerosis    Deep vein thrombosis (Formerly [facility])     at age 17 R/T cast    Depression    Diabetes (HCC)    Dialysis patient ([facility])    Diverticulosis of large intestine    Essential hypertension    3/21 echo: severe concentric LVH with normal EF and no MR or pHTN    Extrinsic asthma, unspecified    Heart attack (HCC)    2016- angiogram- no intervention    Heart valve disease    mitral valve repair in 1994/    High blood pressure    High cholesterol    History of blood transfusion    History of mitral valve repair    Hyperlipidemia    Low back pain    tight and stiff after sweeping and mopping    LVH (left ventricular hypertrophy)    Migraines    Mixed hyperlipidemia     HDL 38 LDL 97 VLDL 57     Monoclonal gammopathy    IgG kappa     Muscle weakness    MVP (mitral valve prolapse)    Repair 1994 at [place]; echoes as recently as 3/21 show mild or trivial MR and no stenosis    Neuropathy    Osteoarthritis    hip ,knees    Pneumonia due to organism    Pulmonary embolism (HCC)    Renal disorder    Stroke (HCC)    TIA (transient ischemic attack)    Initial history of left-sided weakness and slurred speech. (+) cocaine. MRI of the brain, CT angiogram of the head and neck, and 2D echo are all unremarkable.     TMJ (dislocation of temporomandibular joint)    Troponin level elevated    Trop 60 60 47 with TIA and no CP: Lexiscan negative with EF 51    Visual impairment     Past Surgical History:   Procedure Laterality Date    Av fistula revision, open Left     Cabg      Colonoscopy N/A 03/26/2023    Procedure: COLONOSCOPY;  Surgeon: Heath Vu MD;  Location:  ENDOSCOPY    Colonoscopy N/A 12/30/2023    Procedure: COLONOSCOPY with cold snare polypectomy and forcep polypectomy;  Surgeon: Ousmane Suarez MD;  Location:  ENDOSCOPY    Colonoscopy & polypectomy  2019    Egd  2019    Duodenitis. Biopsied. EUS for weight loss was negative    Heart surgery      Hernia surgery  08/17/2022    Dr Barnes    Laminectomy,>2 sgmt,lumbar  09/06/2018    L4-L5 Decomp Discectomy  ROEM L4-L5    Mitralplasty w cp bypass  1994    [place]: Repair    Repair rotator cuff,chronic Left     torn and had a ruptured bicep    Sinus surgery        Spine surgery procedure unlisted      Valve repair  1994    mitral valve     Physical Exam     ED Triage Vitals   BP 11/06/24 1155 (!) 188/123   Pulse 11/06/24 1155 112   Resp 11/06/24 1155 22   Temp 11/06/24 1158 99.2 °F (37.3 °C)   Temp src 11/06/24 1158 Temporal   SpO2 11/06/24 1225 96 %   O2 Device 11/06/24 1222 None (Room air)       Current Vitals:   Vital Signs  BP: (!) 177/119  Pulse: 101  Resp: 23  Temp: 99.2 °F (37.3 °C)  Temp src: Temporal    Oxygen Therapy  SpO2: 98 %  O2 Device: None (Room air)    Physical Exam  GENERAL: Well-developed, well-nourished male sitting up breathing easily in no apparent distress.  Patient is nontoxic in appearance.  HEENT: Head is normocephalic, atraumatic. Pupils are 4 mm equally round and reactive to light. Oropharynx is clear. Mucous membranes are moist.  NECK: There is no focal tenderness to palpation appreciated.  No stridor.  LUNGS: Clear at the apices with diminished breath sounds at both bases. There is good equal air entry bilaterally.  HEART: Regular rate and rhythm. Normal S1, S2 no S3, or S4. No murmur.  ABDOMEN: There is no focal tenderness to palpation appreciated anywhere throughout the abdomen. There is no guarding, no rebound, no mass, and no organomegaly appreciated. There is normoactive bowel sounds.   EXTREMITIES: There is no cyanosis, clubbing, or edema appreciated. Pulses are 2+ and equal in all 4 extremities.  There is an AV fistula with a palpable thrill noted to the left forearm.  NEURO: Patient is awake, alert and oriented to time place and person.  Patient does appear to have some difficulty finding words at this time.  Motor strength is 5 over 5 in all 4 extremities. There are no gross motor or sensory deficits appreciated. Cranial nerves II through XII are intact.  No evidence of any facial  droop appreciated.    Labs Reviewed   COMP METABOLIC PANEL (14) - Abnormal; Notable for the following components:       Result Value    Glucose 151 (*)     BUN 78 (*)     Creatinine 10.50 (*)     Calculated Osmolality 310 (*)     eGFR-Cr 6 (*)     A/G Ratio 0.9 (*)     All other components within normal limits   CBC WITH DIFFERENTIAL WITH PLATELET - Abnormal; Notable for the following components:    RBC 2.90 (*)     HGB 9.6 (*)     HCT 28.1 (*)     All other components within normal limits   TROPONIN I HIGH SENSITIVITY - Abnormal; Notable for the following components:    Troponin I (High Sensitivity) 474 (*)     All other components within normal limits   POCT GLUCOSE - Abnormal; Notable for the following components:    POC Glucose 172 (*)     All other components within normal limits   PROTHROMBIN TIME (PT) - Normal   PTT, ACTIVATED - Normal   LIPID PANEL     EKG    Rate, intervals and axes as noted on EKG Report.  Rate: 103  Rhythm: Sinus Rhythm  Reading: No acute ST elevation appreciated.    CT BRAIN OR HEAD (CPT=70450)    Result Date: 11/6/2024  CONCLUSION:  Motion artifact slightly limits assessment.  Within the limitations of the exam there is no significant midline shift or mass effect.  No acute intracranial hemorrhage.      XR CHEST AP PORTABLE  (CPT=71045)    Result Date: 11/6/2024  CONCLUSION:  Decreased right basilar pneumonia.  Small right pleural effusion.       MDM      Patient's original NIH score is 1 here in the ER.    Admission disposition: 11/6/2024  1:56 PM    Disposition and Plan     Clinical Impression:  1. Expressive aphasia    2. Uncontrolled hypertension    3. Elevated troponin         Disposition:  Admit  11/6/2024  1:56 pm      Signed by Ihsan Cassidy MD on 11/6/2024  2:58 PM       diphenhydrAMINE (Benadryl) 50 mg/mL injection 12.5 mg  Dose: 12.5 mg  Freq: Once Route: IV  Start: 11/06/24 2145 End: 11/06/24 2150  hydrALAzine (Apresoline) 20 mg/mL injection 10 mg  Dose: 10 mg  Freq:  Once Route: IV  Start: 11/06/24 1218 End: 11/06/24 1226  HYDROmorphone (Dilaudid) 1 MG/ML injection 1 mg  Dose: 1 mg  Freq: Once Route: IV  Start: 11/06/24 1355 End: 11/06/24 1359  labetalol (Trandate) 5 mg/mL injection 20 mg  Dose: 20 mg  Freq: Once Route: IV  Start: 11/06/24 1355 End: 11/06/24 1359  HYDROmorphone (Dilaudid) 1 MG/ML injection 0.4 mg  Dose: 0.4 mg  Freq: Every 2 hour PRN Route: IV  PRN Reason: moderate pain  Start: 11/06/24 2025 x 1    labetalol (Trandate) 5 mg/mL injection 10 mg  Dose: 10 mg  Freq: Every 4 hours PRN Route: IV  PRN Reason: Increased blood pressure  Start: 11/06/24 1747x 1         MRI BRAIN   CONCLUSION: No abnormalities are seen.     History and physical      Assessment/Plan:   44 year old male with PMH sig for TIA, ESRD on HD, HFpEF, HTN, T2DM, CAD, COPD, PE who presents for speech changes.      #CVA r/o  #TIA h/o  -CT and MRI brain negative  -Neurochecks  -Monitor on telemetry  -Cont ASA  -Neurology consulted, appreciate recommendations.  -PT/OT     # Hypertensive urgency  #cHTN  -No need for permissive hypertension given symptoms have been going on for several days.  Continue home Coreg, losartan, hydralazine, nifedipine.  IV labetalol as needed.     # ESRD on HD  # HFpEF, chronic  -Nephrology consulted  -Optimized volume status with hemodialysis     # Bipolar disorder  -Continue Abilify, bupropion, Lamictal     # Depression  -Continue fluoxetine     #COPD, chronic  -Continue home inhalers       Nephrology consult  11-7-24     Gen - intermittent myoclonic jerking noted  HEENT - NCAT  CV - RRR  Resp - CTAB  Abd - soft   - no lobo  Ext - warm, no leg swelling  Vascular - RIJ tunnelled HD catheter, LUE AVF + thrill and bruit  MS - awake, alert, answering questions appropriately     LABORATORY DATA:               Lab Results   Component Value Date      (H) 11/07/2024     BUN 37 (H) 11/07/2024     CREATSERUM 6.54 (H) 11/07/2024     ANIONGAP 10 11/07/2024     Lab Results    Component Value Date     WBC 8.8 11/07/2024     RBC 3.06 (L) 11/07/2024     HGB 9.8 (L) 11/07/2024     HCT 28.2 (L) 11/07/2024     .0 11/07/2024     CA 8.9 11/07/2024      OSMOCALC 295 11/07/2024     ALKPHO 85 11/06/2024     AST 27 11/06/2024     ALT 21 11/06/2024     BILT 0.5 11/06/2024     TP 7.2 11/06/2024     ALB 4.3 11/07/2024     GLOBULIN 3.7 11/06/2024      11/07/2024     K 4.2 11/07/2024      11/07/2024     CO2 27.0 11/07/2024       ASSESSMENT/PLAN:      45 yo M with history of ESRD on iHD MWF via RIJ CVC, LUE AVF, hyperaldosteronism, DM, bipolar disorder, recurrent GI bleed, pneumonia recently presented with myoclonic movements, slurred speech and expressive aphasia.     Nephrology consulted for ESRD management.     ESRD:  -- continue MWF HD schedule  -- avoid class 1 gadolinium agents     Anemia:  -- epo with HD tomorrow if BP ok     Hyperphosphatemia:  -- HD tomorrow  -- sevelamer 1600 TID AC  -- low phos diet     HTN:  -- coreg 25 mg BID, hydralazine 50 TID, losartan 100 mg/d, nifedipine XL 60 mg BID  -- imdur held     Myoclonus, expressive aphasia:  -- neurology consulted       NEUROLOGY   CONSULT NOTE  11-7-24     PHYSICAL EXAM:     NEUROLOGIC:    Mental Status: Awake, alert, oriented to person, place and time.  Follows simple commands, no obvious aphasia or dysarthria.  Stuttering noted.  Cranial nerves: PERRL.  Visual fields full.  EOMI. mild right nasolabial fold flattening.  Hearing grossly intact. Tongue midline with normal movements.   Motor: Right pronator drift; Motor exam is symmetrical and antigravity in all extremities bilaterally  Sensation: Intact to light touch bilaterally  Cerebellar: Normal Finger-To-Nose test    ASSESSMENT/PLAN   44 year old male with:     Episode of stuttering/word finding difficulties/aphasia.  Symptoms noted to be intermittent while talking to the patient.  Findings likely to be due to nonneurological condition such as worsening of patient's  psychiatry health.  Advised psychiatry consult for adjustment of medications/evaluations.  Concern for seizure.  Advise EEG for further evaluation.  History of previous stroke with residual right-sided weakness.  Continue home aspirin and fenofibrate.  Cognitive decline possibly related to pseudodementia.  Patient is being treated with memantine.  Advised to continue medication.        MEDICATIONS ADMINISTERED IN LAST 1 DAY:  aspirin chewable tab 81 mg       Date Action Dose Route User    11/8/2024 0818 Given 81 mg Oral Colleen Vasquez RN    11/7/2024 0929 Given 81 mg Oral Laurel Carreon RN          buPROPion ER (Wellbutrin XL) 24 hr tab 150 mg       Date Action Dose Route User    11/8/2024 0818 Given 150 mg Oral Colleen Vasquez RN    11/7/2024 0924 Given 150 mg Oral Laurel Carreon RN          carvedilol (Coreg) tab 25 mg       Date Action Dose Route User    11/7/2024 1704 Given 25 mg Oral Laurel Carreon RN    11/7/2024 0923 Given 25 mg Oral Laurel Carreon RN          diphenhydrAMINE (Benadryl) cap/tab 25 mg       Date Action Dose Route User    11/7/2024 1130 Given 25 mg Oral Laurel Carreon RN          FLUoxetine (PROzac) cap 40 mg       Date Action Dose Route User    11/8/2024 0818 Given 40 mg Oral Colleen Vasquez RN    11/7/2024 0923 Given 40 mg Oral Laurel Carreon RN          fluticasone propionate (Flonase) 50 MCG/ACT nasal suspension 2 spray       Date Action Dose Route User    11/7/2024 0924 Given 2 spray Each Nare Laurel Carreon RN          heparin (Porcine) 5000 UNIT/ML injection 5,000 Units       Date Action Dose Route User    11/8/2024 0819 Given 5,000 Units Subcutaneous (Left Lower Abdomen) Colleen Vasquez RN    11/7/2024 2352 Given 5,000 Units Subcutaneous (Left Lower Abdomen) Mary Lou Knott RN    11/7/2024 1706 Given 5,000 Units Subcutaneous (Left Lower Abdomen) Laurel Carreon RN    11/7/2024 0924 Given 5,000 Units Subcutaneous (Right Upper Abdomen) Laurel Carreon RN          hydrALAZINE (Apresoline)  tab 50 mg       Date Action Dose Route User    11/7/2024 2352 Given 50 mg Oral IsMary Lou myers RN    11/7/2024 1704 Given 50 mg Oral Laurel Carreon RN    11/7/2024 0923 Given 50 mg Oral Laurel Carreon RN          HYDROmorphone (Dilaudid) 1 MG/ML injection 0.4 mg       Date Action Dose Route User    11/8/2024 0626 Given 0.4 mg Intravenous IsmailMary Lou RN    11/8/2024 0330 Given 0.4 mg Intravenous IsmailMary Lou RN    11/7/2024 1701 Given 0.4 mg Intravenous Laurel Carreon RN          HYDROmorphone (Dilaudid) 1 MG/ML injection 0.8 mg       Date Action Dose Route User    11/7/2024 2131 Given 0.8 mg Intravenous IsmailMary Lou RN    11/7/2024 1904 Given 0.8 mg Intravenous Laurel Carreon RN    11/7/2024 0929 Given 0.8 mg Intravenous Laurel Carreon RN          losartan (Cozaar) tab 100 mg       Date Action Dose Route User    11/7/2024 0929 Given 100 mg Oral Laurel Carreon RN          memantine (Namenda) tab 5 mg       Date Action Dose Route User    11/8/2024 0818 Given 5 mg Oral Colleen Vasquez RN    11/7/2024 2130 Given 5 mg Oral Mary Lou Knott RN    11/7/2024 0922 Given 5 mg Oral Laurel Carreon RN          methocarbamol (Robaxin) tab 500 mg       Date Action Dose Route User    11/8/2024 0818 Given 500 mg Oral Colleen Vasquez RN    11/7/2024 2130 Given 500 mg Oral Mary Lou Knott RN    11/7/2024 1704 Given 500 mg Oral Laurel Carreon RN    11/7/2024 0940 Given 500 mg Oral Laurel Carreon RN          NIFEdipine ER (Procardia-XL) 24 hr tab 60 mg       Date Action Dose Route User    11/7/2024 2130 Given 60 mg Oral IsMary Lou myers RN    11/7/2024 0922 Given 60 mg Oral Laurel Carreon RN          sevelamer carbonate (Renvela) tab 800 mg       Date Action Dose Route User    11/7/2024 0923 Given 800 mg Oral Laurel Carreon RN          sevelamer carbonate (Renvela) tab 1,600 mg       Date Action Dose Route User    11/8/2024 0818 Given 1,600 mg Oral Colleen Vasquez RN    11/7/2024 1704 Given 1,600 mg Oral Laurel Carreon RN    11/7/2024  1338 Given 1,600 mg Oral Laurel Carreon RN          tamsulosin (Flomax) cap 0.4 mg       Date Action Dose Route User    11/8/2024 0818 Given 0.4 mg Oral Colleen Vasquez RN    11/7/2024 0924 Given 0.4 mg Oral Laurel Carreon RN          ARIPiprazole (Abilify) tab 15 mg       Date Action Dose Route User    11/8/2024 0818 Given 15 mg Oral Colleen Vasquez RN    11/7/2024 0929 Given 15 mg Oral Laurel Carreon RN          lamoTRIgine (LaMICtal) tab 150 mg       Date Action Dose Route User    11/8/2024 0818 Given 150 mg Oral Colleen Vasquez RN    11/7/2024 0923 Given 150 mg Oral Laurel Carreon RN          amphetamine-dextroamphetamine (Adderall) tab 15 mg       Date Action Dose Route User    11/8/2024 0626 Given 15 mg Oral IsMary Lou myers RN    11/7/2024 1704 Given 15 mg Oral Laurel Carreon RN            Vitals (last day)       Date/Time Temp Pulse Resp BP SpO2 Weight O2 Device O2 Flow Rate (L/min) [facility]    11/08/24 0800 97.4 °F (36.3 °C) 100 37 125/75 79 % -- Nasal cannula 2 L/min AB    11/08/24 0400 97.7 °F (36.5 °C) 95 20 110/78 97 % -- None (Room air) --     11/08/24 0000 97.7 °F (36.5 °C) 88 20 108/75 96 % -- None (Room air) --     11/07/24 2000 97.5 °F (36.4 °C) 89 24 113/68 95 % -- -- -- CM    11/07/24 1600 98.2 °F (36.8 °C) 94 42 121/80 98 % -- None (Room air) --     11/07/24 1400 -- 98 -- -- -- -- -- -- AO    11/07/24 1200 98.5 °F (36.9 °C) 97 18 105/73 98 % -- None (Room air) --     11/07/24 1140 -- -- -- 105/73 -- -- -- --     11/07/24 1139 -- -- -- 90/68 -- -- -- --     11/07/24 1126 -- -- -- 103/90 -- -- -- --     11/07/24 0800 98.5 °F (36.9 °C) 101 20 149/109 94 % -- None (Room air) --     11/07/24 0356 98.7 °F (37.1 °C) -- 20 -- -- -- -- -- OG    11/07/24 0356 -- 92 -- 149/91 98 % -- -- -- LR    11/07/24 0151 98.6 °F (37 °C) 94 20 158/114 96 % -- None (Room air) -- LR    11/07/24 0056 -- 99 20 169/113 95 % -- -- -- LR    11/07/24 0024 -- 99 23 181/126 95 % -- -- -- LR          11/06/24 1618 -- 99  24 167/109 Abnormal  92 % -- -- -- MM   11/06/24 1444 -- 96 26 173/117 Abnormal  100 % -- None (Room air) -- MM   11/06/24 1358 -- 101 23 177/119 Abnormal  98 % -- None (Room air) -- MM   11/06/24 1227 -- -- -- 172/129 Abnormal  -- -- -- -- AT   11/06/24 1225 -- 104 32 Abnormal  168/130 Abnormal  96 % -- None (Room air) -- AT   11/06/24 1222 -- -- -- -- -- -- None (Room air) -- AT   11/06/24 1158 99.2 °F (37.3 °C) -- -- -- -- -- -- -- SC   11/06/24 1155 -- 112 22 188/123 Abnormal  -- 240 lb (108.9 kg) -- -- SC

## 2024-11-08 NOTE — PLAN OF CARE
Assumed care at 0700  Patient alert, oriented x4  Tremors and slurred speech improving  Psych consulted. patient to follow-up outpatient with his psychiatrist  Worked with PT. Ambulated in halls  Dilaudid given for neck pain  HD this evening  EEG completed    Plan of care discussed with patient

## 2024-11-08 NOTE — PROCEDURES
EEG REPORT;    Reason for Examination: Altered mental status    Technical Summary:   18 Channels of EEG and 1 Channel of EKG was performed utilizing internation 10/20 method.        Background Activity:   The background activity consisted of 9 Hz waveforms, reactive to eye opening/ external stimulation.    Abnormality:  Throughout the recording low to medium voltage, polymorphic, 3 to 6 Hz slow activity was noted diffusely over both hemispheres      Activation:    Hyperventilation:   Not Performed.    Photic Stimulation:  Driving response seen.No       Sleep:  Stage I sleep seen.       Impression:  This is a Abnormal EEG. No focal, lateralized or generalized epileptiform activity seen.   Mild diffuse slowing into delta and theta range was noted.  This constellation of findings can be seen in encephalopathy due to metabolic/toxic etiology, medication effects or diffuse cerebral injury.  Clinical correlation is recommended.        Emeka Hernandez MD  [facility].

## 2024-11-09 VITALS
DIASTOLIC BLOOD PRESSURE: 91 MMHG | HEART RATE: 103 BPM | BODY MASS INDEX: 30.8 KG/M2 | RESPIRATION RATE: 22 BRPM | SYSTOLIC BLOOD PRESSURE: 131 MMHG | HEIGHT: 74 IN | OXYGEN SATURATION: 95 % | WEIGHT: 240 LBS | TEMPERATURE: 98 F

## 2024-11-09 PROBLEM — R79.89 ELEVATED TROPONIN: Status: RESOLVED | Noted: 2019-06-10 | Resolved: 2024-11-09

## 2024-11-09 LAB
ALBUMIN SERPL-MCNC: 4.1 G/DL (ref 3.2–4.8)
ANION GAP SERPL CALC-SCNC: 7 MMOL/L (ref 0–18)
BASOPHILS # BLD AUTO: 0.07 X10(3) UL (ref 0–0.2)
BASOPHILS NFR BLD AUTO: 1.1 %
BUN BLD-MCNC: 32 MG/DL (ref 9–23)
CALCIUM BLD-MCNC: 9.8 MG/DL (ref 8.7–10.4)
CHLORIDE SERPL-SCNC: 102 MMOL/L (ref 98–112)
CO2 SERPL-SCNC: 27 MMOL/L (ref 21–32)
CREAT BLD-MCNC: 6.23 MG/DL
EGFRCR SERPLBLD CKD-EPI 2021: 10 ML/MIN/1.73M2 (ref 60–?)
EOSINOPHIL # BLD AUTO: 0.29 X10(3) UL (ref 0–0.7)
EOSINOPHIL NFR BLD AUTO: 4.6 %
ERYTHROCYTE [DISTWIDTH] IN BLOOD BY AUTOMATED COUNT: 14.4 %
GLUCOSE BLD-MCNC: 117 MG/DL (ref 70–99)
GLUCOSE BLD-MCNC: 132 MG/DL (ref 70–99)
HCT VFR BLD AUTO: 32.5 %
HGB BLD-MCNC: 11 G/DL
IMM GRANULOCYTES # BLD AUTO: 0.01 X10(3) UL (ref 0–1)
IMM GRANULOCYTES NFR BLD: 0.2 %
LYMPHOCYTES # BLD AUTO: 1.15 X10(3) UL (ref 1–4)
LYMPHOCYTES NFR BLD AUTO: 18.4 %
MAGNESIUM SERPL-MCNC: 2 MG/DL (ref 1.6–2.6)
MCH RBC QN AUTO: 31.4 PG (ref 26–34)
MCHC RBC AUTO-ENTMCNC: 33.8 G/DL (ref 31–37)
MCV RBC AUTO: 92.9 FL
MONOCYTES # BLD AUTO: 0.65 X10(3) UL (ref 0.1–1)
MONOCYTES NFR BLD AUTO: 10.4 %
NEUTROPHILS # BLD AUTO: 4.09 X10 (3) UL (ref 1.5–7.7)
NEUTROPHILS # BLD AUTO: 4.09 X10(3) UL (ref 1.5–7.7)
NEUTROPHILS NFR BLD AUTO: 65.3 %
OSMOLALITY SERPL CALC.SUM OF ELEC: 290 MOSM/KG (ref 275–295)
PHOSPHATE SERPL-MCNC: 5 MG/DL (ref 2.4–5.1)
PLATELET # BLD AUTO: 216 10(3)UL (ref 150–450)
POTASSIUM SERPL-SCNC: 4.4 MMOL/L (ref 3.5–5.1)
RBC # BLD AUTO: 3.5 X10(6)UL
SODIUM SERPL-SCNC: 136 MMOL/L (ref 136–145)
WBC # BLD AUTO: 6.3 X10(3) UL (ref 4–11)

## 2024-11-09 NOTE — DISCHARGE INSTRUCTIONS
-Follow up with your PCP and psychiatrist in 1 week  -Continue dialysis sessions  -Keep taking all of your blood pressure medications

## 2024-11-09 NOTE — PLAN OF CARE
Assumed care at approx 0730.  Alert and oriented x4.  On room air, respirations even and unlabored.  NSR on tele.  Pain controlled with meds.  Safety precautions maintained, patient reports needs met, call light within reach.      Problem: Diabetes/Glucose Control  Goal: Glucose maintained within prescribed range  Description: INTERVENTIONS:  - Monitor Blood Glucose as ordered  - Assess for signs and symptoms of hyperglycemia and hypoglycemia  - Administer ordered medications to maintain glucose within target range  - Assess barriers to adequate nutritional intake and initiate nutrition consult as needed  - Instruct patient on self management of diabetes  Outcome: Progressing

## 2024-11-09 NOTE — PROGRESS NOTES
[facility]   part of [facility]    Nephrology Progress Note    Godwin Fonseca Attending:  Vicky Weiss DO       SUBJECTIVE:     Tolerated HD with 2.1L UF.  No complaints this morning.    PHYSICAL EXAM:     Vital Signs: BP (!) 131/91 (BP Location: Right arm)   Pulse 103   Temp 98.2 °F (36.8 °C) (Oral)   Resp 22   Ht 188 cm (6' 2\")   Wt 240 lb (108.9 kg)   SpO2 95%   BMI 30.81 kg/m²   Temp (24hrs), Av °F (36.7 °C), Min:97.6 °F (36.4 °C), Max:98.6 °F (37 °C)       Intake/Output Summary (Last 24 hours) at 2024 1146  Last data filed at 2024 0745  Gross per 24 hour   Intake --   Output 3100 ml   Net -3100 ml     Wt Readings from Last 3 Encounters:   24 240 lb (108.9 kg)   10/24/24 240 lb (108.9 kg)   10/22/24 241 lb 2.9 oz (109.4 kg)       General: pleasant, well appearing, walking well  Skin: no visible rashes  HEENT: NCAT  Lungs: nonlabored with ambulation  Abdomen: nondistended  Extremities: no leg edema  Neurologic/Psych: mentating well  LUE AVF + thrill  RIJ tunnelled HD catheter     LABORATORY DATA:     Lab Results   Component Value Date     (H) 2024    BUN 32 (H) 2024    BUNCREA 13.0 2022    CREATSERUM 6.23 (H) 2024    ANIONGAP 7 2024    GFR 59 (L) 2018    GFRNAA 30 (L) 2022    GFRAA 35 (L) 2022    CA 9.8 2024    OSMOCALC 290 2024    ALKPHO 85 2024    AST 27 2024    ALT 21 2024    BILT 0.5 2024    TP 7.2 2024    ALB 4.1 2024    GLOBULIN 3.7 2024     2024    K 4.4 2024     2024    CO2 27.0 2024     Lab Results   Component Value Date    WBC 6.3 2024    RBC 3.50 (L) 2024    HGB 11.0 (L) 2024    HCT 32.5 (L) 2024    .0 2024    MPV 11.5 2012    MCV 92.9 2024    MCH 31.4 2024    MCHC 33.8 2024    RDW 14.4 2024    NEPRELIM 4.09 2024    NEPERCENT 65.3 2024     LYPERCENT 18.4 11/09/2024    MOPERCENT 10.4 11/09/2024    EOPERCENT 4.6 11/09/2024    BAPERCENT 1.1 11/09/2024    NE 4.09 11/09/2024    LYMABS 1.15 11/09/2024    MOABSO 0.65 11/09/2024    EOABSO 0.29 11/09/2024    BAABSO 0.07 11/09/2024     Lab Results   Component Value Date    MALBP 167.00 05/24/2023    CREUR 142.00 05/24/2023    CREUR 142.00 05/24/2023     Lab Results   Component Value Date    COLORUR Colorless (A) 09/16/2024    CLARITY Clear 09/16/2024    SPECGRAVITY 1.012 09/16/2024    GLUUR Normal 09/16/2024    BILUR Negative 09/16/2024    KETUR Negative 09/16/2024    BLOODURINE 3+ (A) 09/16/2024    PHURINE 6.0 09/16/2024    PROUR 50 (A) 09/16/2024    UROBILINOGEN Normal 09/16/2024    NITRITE Negative 09/16/2024    LEUUR Negative 09/16/2024    WBCUR 1-5 09/16/2024    RBCUR 0-2 09/16/2024    EPIUR Few (A) 09/16/2024    BACUR None Seen 09/16/2024    CAOXUR Occasional (A) 04/20/2018    HYLUR Present (A) 05/14/2019         IMAGING:     Reviewed.    MEDICATIONS:       Current Facility-Administered Medications   Medication Dose Route Frequency    sevelamer carbonate (Renvela) tab 2,400 mg  2,400 mg Oral TID CC    aspirin chewable tab 81 mg  81 mg Oral Daily    losartan (Cozaar) tab 100 mg  100 mg Oral Daily    methocarbamol (Robaxin) tab 500 mg  500 mg Oral TID    traMADol (Ultram) tab 50 mg  50 mg Oral Q12H PRN    diphenhydrAMINE (Benadryl) cap/tab 25 mg  25 mg Oral Q6H PRN    sodium chloride 0.9 % IV bolus 100 mL  100 mL Intravenous Q30 Min PRN    And    albumin human (Albumin) 25% injection 25 g  25 g Intravenous PRN Dialysis    heparin (Porcine) 1000 UNIT/ML injection 1,500 Units  1.5 mL Intracatheter Once    epoetin sylvia (Epogen, Procrit) 5,000 Units injection  5,000 Units Subcutaneous Once per day on Monday Wednesday Friday    albuterol (Ventolin HFA) 108 (90 Base) MCG/ACT inhaler 2 puff  2 puff Inhalation Q6H PRN    ARIPiprazole (Abilify) tab 15 mg  15 mg Oral Daily    buPROPion ER (Wellbutrin XL) 24 hr tab  150 mg  150 mg Oral Daily    carvedilol (Coreg) tab 25 mg  25 mg Oral BID with meals    FLUoxetine (PROzac) cap 40 mg  40 mg Oral Daily    fluticasone propionate (Flonase) 50 MCG/ACT nasal suspension 2 spray  2 spray Each Nare Daily    hydrALAZINE (Apresoline) tab 50 mg  50 mg Oral Q8H MARTHA    lamoTRIgine (LaMICtal) tab 150 mg  150 mg Oral Daily    amphetamine-dextroamphetamine (Adderall) tab 15 mg  15 mg Oral BID AC    memantine (Namenda) tab 5 mg  5 mg Oral BID    NIFEdipine ER (Procardia-XL) 24 hr tab 60 mg  60 mg Oral BID    tamsulosin (Flomax) cap 0.4 mg  0.4 mg Oral Daily    labetalol (Trandate) 5 mg/mL injection 10 mg  10 mg Intravenous Q4H PRN    heparin (Porcine) 5000 UNIT/ML injection 5,000 Units  5,000 Units Subcutaneous Q8H MARTHA    acetaminophen (Tylenol) tab 650 mg  650 mg Oral Q4H PRN    ondansetron (Zofran) 4 MG/2ML injection 4 mg  4 mg Intravenous Q6H PRN    prochlorperazine (Compazine) 10 MG/2ML injection 5 mg  5 mg Intravenous Q8H PRN    polyethylene glycol (PEG 3350) (Miralax) 17 g oral packet 17 g  17 g Oral Daily PRN    sennosides (Senokot) tab 17.2 mg  17.2 mg Oral Nightly PRN    bisacodyl (Dulcolax) 10 MG rectal suppository 10 mg  10 mg Rectal Daily PRN    HYDROmorphone (Dilaudid) 1 MG/ML injection 0.2 mg  0.2 mg Intravenous Q2H PRN    Or    HYDROmorphone (Dilaudid) 1 MG/ML injection 0.4 mg  0.4 mg Intravenous Q2H PRN    Or    HYDROmorphone (Dilaudid) 1 MG/ML injection 0.8 mg  0.8 mg Intravenous Q2H PRN       ASSESSMENT/PLAN:     43 yo M with history of ESRD on iHD MWF via RIJ CVC, LUE AVF, hyperaldosteronism, DM, bipolar disorder, recurrent GI bleed, pneumonia recently presented with myoclonic movements, slurred speech and expressive aphasia.     Nephrology consulted for ESRD management.     ESRD:  -- continue MWF HD schedule  -- avoid class 1 gadolinium agents     Anemia:  -- epo with HD     Hyperphosphatemia:  -- sevelamer 2400 TID AC  -- low phos diet - decrease nuts     HTN:  -- coreg 25 mg  BID, hydralazine 50 TID, losartan 100 mg/d, nifedipine XL 60 mg BID  -- imdur held     Myoclonus, expressive aphasia:  -- neurology consulted    Ok to discharge from nephrology perspective.        Thank you for allowing me to participate in the care of this patient. Please do not hesitate to call with questions or concerns.        Lenka Bond MD  [facility]  [address] [place] [id number]    11/9/2024  11:46 AM

## 2024-11-09 NOTE — PLAN OF CARE
Discharged Home via Wheelchair.  Accompanied by Family member and Support staff  Belongings Taken by patient/family.     Discharge instructions given, patient verbalizes understanding, All questions answered, all belongings with patient.

## 2024-11-09 NOTE — DISCHARGE SUMMARY
[facility] Hospitalist Discharge Summary     Patient ID:  Godwin Fonseca  44 year old  4/12/1980    Admit date: 11/6/2024  Discharge date and time: 11/9/24  Attending Physician: Vicky Weiss DO   Primary Care Physician: Adrian Small MD   Discharge Diagnoses: Expressive aphasia [R47.01]  Elevated troponin [R79.89]  Uncontrolled hypertension [I10]    Discharge Condition: Stable    Disposition:  Home    Follow up:   - PCP within 1 week  - Consults: psych    Hospital Course:  44 year old male with PMH sig for TIA, ESRD on HD, HFpEF, HTN, T2DM, CAD, COPD, PE who presents for speech changes.  He he was at his neurology appointment today and was noted to be slurring his speech and was recommended to go to the ED for further evaluation.  States he has been having speaking and trouble finding his words past few days.  Upon examination he still having trouble speaking.  Also reports he has been having memory issues.  He also complains of neck pain.  He had a CT head and MRI brain done at [place] [facility], which were negative.     #Aphasia  #Cognitive dysfunction  #Pseudodementia  #TIA/ CVA h/o  -CT and MRI brain negative  -Neurochecks  -Monitor on telemetry  -Cont ASA  -Neurology consulted. Symptoms seem psychiatric in nature.  -Follow up EEG results - no seizure activity noted  -Psych consulted, patient would prefer following up with established psychiatrist to address medication changes.        # Hypertensive urgency, resolved  #cHTN  -Continue home Coreg, losartan, hydralazine, nifedipine.  IV labetalol as needed.     # Cervical radiculopathy  -Pain control as needed  -Follow-up with spine surgeon     # ESRD on HD  # HFpEF, chronic  -Nephrology consulted  -Optimized volume status with hemodialysis     # Bipolar disorder  -Continue Abilify, bupropion, Lamictal     # Depression  -Continue fluoxetine     #COPD, chronic  -Continue home inhalers    Exam on Day of DC:  BP (!) 131/91 (BP Location: Right arm)   Pulse 103   Temp 98.2  °F (36.8 °C) (Oral)   Resp 22   Ht 6' 2\" (1.88 m)   Wt 240 lb (108.9 kg)   SpO2 95%   BMI 30.81 kg/m²   Physical Exam:  General: Alert, awake, +aphasia/ stuttering.   HEENT:  Normocephalic, atraumatic.  Neck:  Trachea midline.  Neck supple.  Chest:  Clear to auscultation bilaterally. No wheezes, rales, or rhonchi.  CV:  Regular rate and rhythm.  Positive S1/S2. No murmur, no gallops, no rubs  GI: Bowel sounds present in all four quadrants, abdomen is soft, non-tender, non-distended.  Extremities: Bilateral lower extremity edema.  No cyanosis.  Neurological:  AAOx3.  Moving extremities.  Right-sided weakness.  + Tremors  Skin:  Warm and dry.      I as the attending physician reconciled the current and discharge medications on day of discharge.     Current Discharge Medication List        CONTINUE these medications which have NOT CHANGED    Details   hydrALAZINE 50 MG Oral Tab Take 1 tablet (50 mg total) by mouth every 8 (eight) hours.      memantine 5 MG Oral Tab Take 1 tablet (5 mg total) by mouth 2 (two) times daily.      Lisdexamfetamine Dimesylate 70 MG Oral Cap Take 1 capsule (70 mg total) by mouth every morning.      albuterol 108 (90 Base) MCG/ACT Inhalation Aero Soln Inhale 2 puffs into the lungs every 6 (six) hours as needed for Wheezing.      tamsulosin 0.4 MG Oral Cap Take 1 capsule (0.4 mg total) by mouth daily.      ARIPiprazole 15 MG Oral Tab Take 1 tablet (15 mg total) by mouth daily.      buPROPion  MG Oral Tablet 24 Hr Take 1 tablet (150 mg total) by mouth daily.      lamoTRIgine (LAMICTAL) 150 MG Oral Tab Take 1 tablet (150 mg total) by mouth daily.      FLUoxetine HCl 40 MG Oral Cap Take 1 capsule (40 mg total) by mouth daily.      RENVELA 800 MG Oral Tab Take 1 tablet (800 mg total) by mouth 3 (three) times daily with meals.      losartan 100 MG Oral Tab Take 1 tablet (100 mg total) by mouth daily. Hold if systolic blood pressure <130      NIFEdipine ER 60 MG Oral Tablet 24 Hr Take 1  tablet (60 mg total) by mouth in the morning and 1 tablet (60 mg total) before bedtime. Hold if systolic blood pressure <130.      carvedilol 25 MG Oral Tab Take 1 tablet (25 mg total) by mouth in the morning and 1 tablet (25 mg total) in the evening. Take with meals.      Fenofibrate 134 MG Oral Cap Take 1 capsule by mouth nightly.      tiotropium 18 MCG Inhalation Cap Inhale 1 capsule (18 mcg total) into the lungs daily.      Fluticasone Propionate 50 MCG/ACT Nasal Suspension SPRAY ONCE INTO EACH NOSTRIL BID PRN           STOP taking these medications       levoFLOXacin 250 MG Oral Tab              Vicky Weiss DO  [facility]

## 2024-11-09 NOTE — PROGRESS NOTES
[facility] Hospitalist Progress Note     PCP: Adrian Small MD    SUBJECTIVE:  Speech stuttering/ aphasia seems to have improved slightly. Still with tremors.     OBJECTIVE:  Temp:  [97.4 °F (36.3 °C)-98.6 °F (37 °C)] 98.6 °F (37 °C)  Pulse:  [] 100  Resp:  [20-37] 20  BP: (108-132)/(68-83) 132/81  SpO2:  [79 %-98 %] 95 %    Intake/Output:    Intake/Output Summary (Last 24 hours) at 11/8/2024 1832  Last data filed at 11/8/2024 0800  Gross per 24 hour   Intake --   Output 720 ml   Net -720 ml       Last 3 Weights   11/06/24 1743 240 lb (108.9 kg)   11/06/24 1155 240 lb (108.9 kg)   10/24/24 2355 240 lb (108.9 kg)   10/22/24 0626 241 lb 2.9 oz (109.4 kg)   10/21/24 0500 248 lb 14.4 oz (112.9 kg)   10/20/24 0531 246 lb 7.6 oz (111.8 kg)   10/19/24 2326 239 lb 13.8 oz (108.8 kg)   10/19/24 0354 239 lb 13.8 oz (108.8 kg)   10/18/24 0541 245 lb 9.5 oz (111.4 kg)   10/17/24 1136 256 lb 6.3 oz (116.3 kg)   10/17/24 0703 240 lb (108.9 kg)       Exam  Physical Exam:  General: Alert, awake, +aphasia.   HEENT:  Normocephalic, atraumatic.  Neck:  Trachea midline.  Neck supple.  Chest:  Clear to auscultation bilaterally. No wheezes, rales, or rhonchi.  CV:  Regular rate and rhythm.  Positive S1/S2. No murmur, no gallops, no rubs  GI: Bowel sounds present in all four quadrants, abdomen is soft, non-tender, non-distended.  Extremities: Bilateral lower extremity edema.  No cyanosis.  Neurological:  AAOx3.  Moving extremities.  Right-sided weakness.  + Tremors  Skin:  Warm and dry.      Data Review:       Labs:     Recent Labs   Lab 11/06/24  1206 11/07/24  0601 11/08/24  0543   WBC 9.0 8.8 6.4   HGB 9.6* 9.8* 9.8*   MCV 96.9 92.2 96.4   .0 216.0 222.0   INR 1.00  --   --        Recent Labs   Lab 11/06/24  1206 11/07/24  0601 11/08/24  0543    138 139   K 4.5 4.2 4.6    101 102   CO2 22.0 27.0 26.0   BUN 78* 37* 48*   CREATSERUM 10.50* 6.54* 8.55*   CA 8.5 8.9 8.8   MG  --  1.9 2.0   PHOS  --  7.0* 8.0*   GLU  151* 102* 109*       Recent Labs   Lab 11/06/24  1206 11/07/24  0601 11/08/24  0543   ALT 21  --   --    AST 27  --   --    ALB 3.5 4.3 4.1       Recent Labs   Lab 11/07/24  1640 11/07/24  2136 11/08/24  0543 11/08/24  1259 11/08/24  1540   PGLU 99 176* 117* 121* 130*       No results for input(s): \"TROP\" in the last 168 hours.      Meds:      sevelamer carbonate  2,400 mg Oral TID CC    aspirin  81 mg Oral Daily    losartan  100 mg Oral Daily    methocarbamol  500 mg Oral TID    heparin  1.5 mL Intracatheter Once    epoetin sylvia  5,000 Units Subcutaneous Once per day on Monday Wednesday Friday    ARIPiprazole  15 mg Oral Daily    buPROPion ER  150 mg Oral Daily    carvedilol  25 mg Oral BID with meals    FLUoxetine HCl  40 mg Oral Daily    fluticasone propionate  2 spray Each Nare Daily    hydrALAZINE  50 mg Oral Q8H MARTHA    lamoTRIgine  150 mg Oral Daily    amphetamine-dextroamphetamine  15 mg Oral BID AC    memantine  5 mg Oral BID    NIFEdipine ER  60 mg Oral BID    tamsulosin  0.4 mg Oral Daily    heparin  5,000 Units Subcutaneous Q8H MARTHA         traMADol    diphenhydrAMINE    sodium chloride **AND** albumin human    albuterol    labetalol    acetaminophen **OR** [DISCONTINUED] HYDROcodone-acetaminophen **OR** [DISCONTINUED] HYDROcodone-acetaminophen    ondansetron    prochlorperazine    polyethylene glycol (PEG 3350)    sennosides    bisacodyl    HYDROmorphone **OR** HYDROmorphone **OR** HYDROmorphone       Assessment/Plan:   44 year old male with PMH sig for TIA, ESRD on HD, HFpEF, HTN, T2DM, CAD, COPD, PE who presents for speech changes.      #Acute CVA vs movement disorder vs psychiatric disorder  #TIA/ CVA h/o  -CT and MRI brain negative  -Neurochecks  -Monitor on telemetry  -Cont ASA  -Neurology consulted. Symptoms seem psychiatric in nature.  -Follow up EEG results  -Psych consulted, patient would prefer following up with established psychiatrist to address medication changes.   -PT/OT  -Anticipate dc  tomrrow     # Hypertensive urgency, resolved  #cHTN  -Continue home Coreg, losartan, hydralazine, nifedipine.  IV labetalol as needed.    # Cervical radiculopathy  -Pain control as needed  -Follow-up with spine surgeon     # ESRD on HD  # HFpEF, chronic  -Nephrology consulted  -Optimized volume status with hemodialysis     # Bipolar disorder  -Continue Abilify, bupropion, Lamictal     # Depression  -Continue fluoxetine     #COPD, chronic  -Continue home inhalers    Full Code  Heparin subcu    Jackelineb Abhishek SKINNER  [facility]

## 2024-11-09 NOTE — PLAN OF CARE
Assumed care of pt at 1930, HD completed 2.1L off. Scheduled meds given. Pt c/o post neck pain and given ultram with relief. Pt was requesting dilaudid but was informed that he needed to take po pain meds in preparation for going home. Pt resting comfortably at present. No tremors or slurred speech noted tonight. No seizures noted.

## 2024-11-13 NOTE — PAYOR COMM NOTE
--------------  DISCHARGE REVIEW    Payor: [employer]  Subscriber #:  [id number]  Authorization Number: [id number]    Admit date: 11/6/24  Admit time:   5:32 PM  Discharge Date: 11/9/2024 12:11 PM     Admitting Physician: Laila Ryder MD  Attending Physician:  Victorina att. providers found  Primary Care Physician: Adrian Arce MD          Discharge Summary Notes        Discharge Summary signed by Vicky Weiss DO at 11/9/2024 10:44 AM       Author: Vicky Weiss DO Specialty: HOSPITALIST Author Type: Physician    Filed: 11/9/2024 10:44 AM Date of Service: 11/9/2024 10:40 AM Status: Signed    : Vicky Weiss DO (Physician)         [facility]ist Discharge Summary     Patient ID:  Godwin Fonseca  44 year old  4/12/1980    Admit date: 11/6/2024  Discharge date and time: 11/9/24  Attending Physician: Vicky Weiss DO   Primary Care Physician: Adrian Small MD   Discharge Diagnoses: Expressive aphasia [R47.01]  Elevated troponin [R79.89]  Uncontrolled hypertension [I10]    Discharge Condition: Stable    Disposition:  Home    Follow up:   - PCP within 1 week  - Consults: psych    Hospital Course:  44 year old male with PMH sig for TIA, ESRD on HD, HFpEF, HTN, T2DM, CAD, COPD, PE who presents for speech changes.  He he was at his neurology appointment today and was noted to be slurring his speech and was recommended to go to the ED for further evaluation.  States he has been having speaking and trouble finding his words past few days.  Upon examination he still having trouble speaking.  Also reports he has been having memory issues.  He also complains of neck pain.  He had a CT head and MRI brain done at [place] [facility], which were negative.     #Aphasia  #Cognitive dysfunction  #Pseudodementia  #TIA/ CVA h/o  -CT and MRI brain negative  -Neurochecks  -Monitor on telemetry  -Cont ASA  -Neurology consulted. Symptoms seem psychiatric in nature.  -Follow up EEG results - no seizure activity  noted  -Psych consulted, patient would prefer following up with established psychiatrist to address medication changes.        # Hypertensive urgency, resolved  #cHTN  -Continue home Coreg, losartan, hydralazine, nifedipine.  IV labetalol as needed.     # Cervical radiculopathy  -Pain control as needed  -Follow-up with spine surgeon     # ESRD on HD  # HFpEF, chronic  -Nephrology consulted  -Optimized volume status with hemodialysis     # Bipolar disorder  -Continue Abilify, bupropion, Lamictal     # Depression  -Continue fluoxetine     #COPD, chronic  -Continue home inhalers    Exam on Day of DC:  BP (!) 131/91 (BP Location: Right arm)   Pulse 103   Temp 98.2 °F (36.8 °C) (Oral)   Resp 22   Ht 6' 2\" (1.88 m)   Wt 240 lb (108.9 kg)   SpO2 95%   BMI 30.81 kg/m²   Physical Exam:  General: Alert, awake, +aphasia/ stuttering.   HEENT:  Normocephalic, atraumatic.  Neck:  Trachea midline.  Neck supple.  Chest:  Clear to auscultation bilaterally. No wheezes, rales, or rhonchi.  CV:  Regular rate and rhythm.  Positive S1/S2. No murmur, no gallops, no rubs  GI: Bowel sounds present in all four quadrants, abdomen is soft, non-tender, non-distended.  Extremities: Bilateral lower extremity edema.  No cyanosis.  Neurological:  AAOx3.  Moving extremities.  Right-sided weakness.  + Tremors  Skin:  Warm and dry.      I as the attending physician reconciled the current and discharge medications on day of discharge.     Current Discharge Medication List        CONTINUE these medications which have NOT CHANGED    Details   hydrALAZINE 50 MG Oral Tab Take 1 tablet (50 mg total) by mouth every 8 (eight) hours.      memantine 5 MG Oral Tab Take 1 tablet (5 mg total) by mouth 2 (two) times daily.      Lisdexamfetamine Dimesylate 70 MG Oral Cap Take 1 capsule (70 mg total) by mouth every morning.      albuterol 108 (90 Base) MCG/ACT Inhalation Aero Soln Inhale 2 puffs into the lungs every 6 (six) hours as needed for Wheezing.       tamsulosin 0.4 MG Oral Cap Take 1 capsule (0.4 mg total) by mouth daily.      ARIPiprazole 15 MG Oral Tab Take 1 tablet (15 mg total) by mouth daily.      buPROPion  MG Oral Tablet 24 Hr Take 1 tablet (150 mg total) by mouth daily.      lamoTRIgine (LAMICTAL) 150 MG Oral Tab Take 1 tablet (150 mg total) by mouth daily.      FLUoxetine HCl 40 MG Oral Cap Take 1 capsule (40 mg total) by mouth daily.      RENVELA 800 MG Oral Tab Take 1 tablet (800 mg total) by mouth 3 (three) times daily with meals.      losartan 100 MG Oral Tab Take 1 tablet (100 mg total) by mouth daily. Hold if systolic blood pressure <130      NIFEdipine ER 60 MG Oral Tablet 24 Hr Take 1 tablet (60 mg total) by mouth in the morning and 1 tablet (60 mg total) before bedtime. Hold if systolic blood pressure <130.      carvedilol 25 MG Oral Tab Take 1 tablet (25 mg total) by mouth in the morning and 1 tablet (25 mg total) in the evening. Take with meals.      Fenofibrate 134 MG Oral Cap Take 1 capsule by mouth nightly.      tiotropium 18 MCG Inhalation Cap Inhale 1 capsule (18 mcg total) into the lungs daily.      Fluticasone Propionate 50 MCG/ACT Nasal Suspension SPRAY ONCE INTO EACH NOSTRIL BID PRN           STOP taking these medications       levoFLOXacin 250 MG Oral Tab              Vicky Weiss DO  [facility]      Electronically signed by Vicky Weiss DO on 11/9/2024 10:44 AM         REVIEWER COMMENTS

## 2024-11-16 ENCOUNTER — APPOINTMENT (OUTPATIENT)
Dept: CT IMAGING | Age: 44
End: 2024-11-16
Attending: EMERGENCY MEDICINE
Payer: MEDICARE

## 2024-11-16 ENCOUNTER — HOSPITAL ENCOUNTER (OUTPATIENT)
Facility: HOSPITAL | Age: 44
Setting detail: OBSERVATION
LOS: 2 days | Discharge: HOME | End: 2024-11-19
Attending: EMERGENCY MEDICINE | Admitting: HOSPITALIST
Payer: MEDICARE

## 2024-11-16 DIAGNOSIS — K92.2 GASTROINTESTINAL HEMORRHAGE, UNSPECIFIED GASTROINTESTINAL HEMORRHAGE TYPE: ICD-10-CM

## 2024-11-16 DIAGNOSIS — K62.5 BRBPR (BRIGHT RED BLOOD PER RECTUM): ICD-10-CM

## 2024-11-16 DIAGNOSIS — R07.9 ACUTE CHEST PAIN: Primary | ICD-10-CM

## 2024-11-16 LAB
ALBUMIN SERPL-MCNC: 3.6 G/DL (ref 3.4–5)
ALBUMIN/GLOB SERPL: 0.9 {RATIO} (ref 1–2)
ALP LIVER SERPL-CCNC: 94 U/L
ALT SERPL-CCNC: 31 U/L
ANION GAP SERPL CALC-SCNC: 10 MMOL/L (ref 0–18)
AST SERPL-CCNC: 23 U/L (ref 15–37)
ATRIAL RATE: 88 BPM
ATRIAL RATE: 93 BPM
BASOPHILS # BLD AUTO: 0.09 X10(3) UL (ref 0–0.2)
BASOPHILS NFR BLD AUTO: 1.3 %
BILIRUB SERPL-MCNC: 0.5 MG/DL (ref 0.1–2)
BUN BLD-MCNC: 57 MG/DL (ref 9–23)
CALCIUM BLD-MCNC: 8.6 MG/DL (ref 8.5–10.1)
CHLORIDE SERPL-SCNC: 101 MMOL/L (ref 98–112)
CHOLEST SERPL-MCNC: 176 MG/DL (ref ?–200)
CO2 SERPL-SCNC: 25 MMOL/L (ref 21–32)
CREAT BLD-MCNC: 9.59 MG/DL
EGFRCR SERPLBLD CKD-EPI 2021: 6 ML/MIN/1.73M2 (ref 60–?)
EOSINOPHIL # BLD AUTO: 0.32 X10(3) UL (ref 0–0.7)
EOSINOPHIL NFR BLD AUTO: 4.6 %
ERYTHROCYTE [DISTWIDTH] IN BLOOD BY AUTOMATED COUNT: 13.9 %
GLOBULIN PLAS-MCNC: 3.9 G/DL (ref 2.8–4.4)
GLUCOSE BLD-MCNC: 100 MG/DL (ref 70–99)
GLUCOSE BLD-MCNC: 141 MG/DL (ref 70–99)
HCT VFR BLD AUTO: 29 %
HDLC SERPL-MCNC: 43 MG/DL (ref 40–59)
HGB BLD-MCNC: 9.9 G/DL
IMM GRANULOCYTES # BLD AUTO: 0.02 X10(3) UL (ref 0–1)
IMM GRANULOCYTES NFR BLD: 0.3 %
LDLC SERPL CALC-MCNC: 94 MG/DL (ref ?–100)
LIPASE SERPL-CCNC: 56 U/L (ref 12–53)
LYMPHOCYTES # BLD AUTO: 1.08 X10(3) UL (ref 1–4)
LYMPHOCYTES NFR BLD AUTO: 15.5 %
MCH RBC QN AUTO: 32.2 PG (ref 26–34)
MCHC RBC AUTO-ENTMCNC: 34.1 G/DL (ref 31–37)
MCV RBC AUTO: 94.5 FL
MONOCYTES # BLD AUTO: 0.88 X10(3) UL (ref 0.1–1)
MONOCYTES NFR BLD AUTO: 12.6 %
NEUTROPHILS # BLD AUTO: 4.59 X10 (3) UL (ref 1.5–7.7)
NEUTROPHILS # BLD AUTO: 4.59 X10(3) UL (ref 1.5–7.7)
NEUTROPHILS NFR BLD AUTO: 65.7 %
NONHDLC SERPL-MCNC: 133 MG/DL (ref ?–130)
OSMOLALITY SERPL CALC.SUM OF ELEC: 300 MOSM/KG (ref 275–295)
P AXIS: 38 DEGREES
P AXIS: 6 DEGREES
P-R INTERVAL: 180 MS
P-R INTERVAL: 192 MS
PLATELET # BLD AUTO: 263 10(3)UL (ref 150–450)
POTASSIUM SERPL-SCNC: 4.6 MMOL/L (ref 3.5–5.1)
PROT SERPL-MCNC: 7.5 G/DL (ref 6.4–8.2)
Q-T INTERVAL: 402 MS
Q-T INTERVAL: 408 MS
QRS DURATION: 88 MS
QRS DURATION: 90 MS
QTC CALCULATION (BEZET): 493 MS
QTC CALCULATION (BEZET): 499 MS
R AXIS: 16 DEGREES
R AXIS: 22 DEGREES
RBC # BLD AUTO: 3.07 X10(6)UL
SODIUM SERPL-SCNC: 136 MMOL/L (ref 136–145)
T AXIS: 147 DEGREES
T AXIS: 66 DEGREES
TRIGL SERPL-MCNC: 232 MG/DL (ref 30–149)
TROPONIN I SERPL HS-MCNC: 2338 NG/L
TROPONIN I SERPL HS-MCNC: 2352 NG/L
VENTRICULAR RATE: 88 BPM
VENTRICULAR RATE: 93 BPM
VLDLC SERPL CALC-MCNC: 38 MG/DL (ref 0–30)
WBC # BLD AUTO: 7 X10(3) UL (ref 4–11)

## 2024-11-16 PROCEDURE — 99285 EMERGENCY DEPT VISIT HI MDM: CPT

## 2024-11-16 PROCEDURE — 93010 ELECTROCARDIOGRAM REPORT: CPT

## 2024-11-16 PROCEDURE — 74176 CT ABD & PELVIS W/O CONTRAST: CPT | Performed by: EMERGENCY MEDICINE

## 2024-11-16 PROCEDURE — 84484 ASSAY OF TROPONIN QUANT: CPT | Performed by: EMERGENCY MEDICINE

## 2024-11-16 PROCEDURE — 96375 TX/PRO/DX INJ NEW DRUG ADDON: CPT

## 2024-11-16 PROCEDURE — 96376 TX/PRO/DX INJ SAME DRUG ADON: CPT

## 2024-11-16 PROCEDURE — 80053 COMPREHEN METABOLIC PANEL: CPT | Performed by: NURSE PRACTITIONER

## 2024-11-16 PROCEDURE — 85025 COMPLETE CBC W/AUTO DIFF WBC: CPT | Performed by: NURSE PRACTITIONER

## 2024-11-16 PROCEDURE — 96361 HYDRATE IV INFUSION ADD-ON: CPT

## 2024-11-16 PROCEDURE — 83690 ASSAY OF LIPASE: CPT | Performed by: NURSE PRACTITIONER

## 2024-11-16 PROCEDURE — 82962 GLUCOSE BLOOD TEST: CPT

## 2024-11-16 PROCEDURE — 93005 ELECTROCARDIOGRAM TRACING: CPT

## 2024-11-16 PROCEDURE — 84484 ASSAY OF TROPONIN QUANT: CPT | Performed by: NURSE PRACTITIONER

## 2024-11-16 PROCEDURE — 96374 THER/PROPH/DIAG INJ IV PUSH: CPT

## 2024-11-16 PROCEDURE — 80061 LIPID PANEL: CPT | Performed by: NURSE PRACTITIONER

## 2024-11-16 RX ORDER — FENOFIBRATE 134 MG/1
1 CAPSULE ORAL NIGHTLY
Status: DISCONTINUED | OUTPATIENT
Start: 2024-11-16 | End: 2024-11-16

## 2024-11-16 RX ORDER — HYDROMORPHONE HYDROCHLORIDE 1 MG/ML
0.4 INJECTION, SOLUTION INTRAMUSCULAR; INTRAVENOUS; SUBCUTANEOUS EVERY 2 HOUR PRN
Status: DISCONTINUED | OUTPATIENT
Start: 2024-11-16 | End: 2024-11-19

## 2024-11-16 RX ORDER — SODIUM CHLORIDE 9 MG/ML
INJECTION, SOLUTION INTRAVENOUS ONCE
Status: COMPLETED | OUTPATIENT
Start: 2024-11-16 | End: 2024-11-16

## 2024-11-16 RX ORDER — HYDROMORPHONE HYDROCHLORIDE 1 MG/ML
0.2 INJECTION, SOLUTION INTRAMUSCULAR; INTRAVENOUS; SUBCUTANEOUS EVERY 2 HOUR PRN
Status: DISCONTINUED | OUTPATIENT
Start: 2024-11-16 | End: 2024-11-19

## 2024-11-16 RX ORDER — LAMOTRIGINE 150 MG/1
150 TABLET ORAL DAILY
Status: DISCONTINUED | OUTPATIENT
Start: 2024-11-17 | End: 2024-11-19

## 2024-11-16 RX ORDER — DEXTROSE MONOHYDRATE AND SODIUM CHLORIDE 5; .45 G/100ML; G/100ML
INJECTION, SOLUTION INTRAVENOUS CONTINUOUS
Status: ACTIVE | OUTPATIENT
Start: 2024-11-16 | End: 2024-11-16

## 2024-11-16 RX ORDER — ALBUTEROL SULFATE 90 UG/1
2 INHALANT RESPIRATORY (INHALATION) EVERY 6 HOURS PRN
Status: DISCONTINUED | OUTPATIENT
Start: 2024-11-16 | End: 2024-11-19

## 2024-11-16 RX ORDER — CARVEDILOL 12.5 MG/1
25 TABLET ORAL
Status: DISCONTINUED | OUTPATIENT
Start: 2024-11-16 | End: 2024-11-19

## 2024-11-16 RX ORDER — SEVELAMER CARBONATE 800 MG/1
800 TABLET, FILM COATED ORAL
Status: DISCONTINUED | OUTPATIENT
Start: 2024-11-16 | End: 2024-11-19

## 2024-11-16 RX ORDER — BUPROPION HYDROCHLORIDE 150 MG/1
150 TABLET ORAL DAILY
Status: DISCONTINUED | OUTPATIENT
Start: 2024-11-17 | End: 2024-11-19

## 2024-11-16 RX ORDER — LOSARTAN POTASSIUM 100 MG/1
100 TABLET ORAL DAILY
Status: DISCONTINUED | OUTPATIENT
Start: 2024-11-17 | End: 2024-11-19

## 2024-11-16 RX ORDER — NIFEDIPINE 30 MG/1
60 TABLET, EXTENDED RELEASE ORAL 2 TIMES DAILY
Status: DISCONTINUED | OUTPATIENT
Start: 2024-11-16 | End: 2024-11-19

## 2024-11-16 RX ORDER — TAMSULOSIN HYDROCHLORIDE 0.4 MG/1
0.4 CAPSULE ORAL DAILY
Status: DISCONTINUED | OUTPATIENT
Start: 2024-11-17 | End: 2024-11-19

## 2024-11-16 RX ORDER — HYDROMORPHONE HYDROCHLORIDE 1 MG/ML
0.5 INJECTION, SOLUTION INTRAMUSCULAR; INTRAVENOUS; SUBCUTANEOUS EVERY 30 MIN PRN
Status: COMPLETED | OUTPATIENT
Start: 2024-11-16 | End: 2024-11-16

## 2024-11-16 RX ORDER — ASPIRIN 81 MG/1
324 TABLET, CHEWABLE ORAL ONCE
Status: COMPLETED | OUTPATIENT
Start: 2024-11-16 | End: 2024-11-16

## 2024-11-16 RX ORDER — ONDANSETRON 2 MG/ML
4 INJECTION INTRAMUSCULAR; INTRAVENOUS ONCE
Status: COMPLETED | OUTPATIENT
Start: 2024-11-16 | End: 2024-11-16

## 2024-11-16 RX ORDER — HYDROMORPHONE HYDROCHLORIDE 1 MG/ML
0.8 INJECTION, SOLUTION INTRAMUSCULAR; INTRAVENOUS; SUBCUTANEOUS EVERY 2 HOUR PRN
Status: DISCONTINUED | OUTPATIENT
Start: 2024-11-16 | End: 2024-11-19

## 2024-11-16 RX ORDER — HYDRALAZINE HYDROCHLORIDE 50 MG/1
50 TABLET, FILM COATED ORAL EVERY 8 HOURS SCHEDULED
Status: DISCONTINUED | OUTPATIENT
Start: 2024-11-16 | End: 2024-11-19

## 2024-11-16 RX ORDER — ONDANSETRON 2 MG/ML
4 INJECTION INTRAMUSCULAR; INTRAVENOUS EVERY 6 HOURS PRN
Status: DISCONTINUED | OUTPATIENT
Start: 2024-11-16 | End: 2024-11-19

## 2024-11-16 RX ORDER — LABETALOL HYDROCHLORIDE 5 MG/ML
20 INJECTION, SOLUTION INTRAVENOUS ONCE
Status: COMPLETED | OUTPATIENT
Start: 2024-11-16 | End: 2024-11-16

## 2024-11-16 RX ORDER — MEMANTINE HYDROCHLORIDE 5 MG/1
5 TABLET ORAL 2 TIMES DAILY
Status: DISCONTINUED | OUTPATIENT
Start: 2024-11-16 | End: 2024-11-19

## 2024-11-16 NOTE — ED QUICK NOTES
Mao Manuel (: 1964) is a 59 y.o. female, patient, here for evaluation of the following chief complaint(s):  Knee Pain (Left knee )       HPI:    Patient returns to the office now status post total knee replacement left. Patient is doing very well. She has noted a relative sharp area pain and points along the medial aspect of the tibia. However, she otherwise seems to made significant progress with range of motion since seen last in the office    Allergies   Allergen Reactions    Iodine Anaphylaxis    Shellfish Derived Anaphylaxis    Adhesive Rash and Itching     BANDAIDS    Morphine Rash, Itching and Hives     spoke with pt who explained morphine produced red splotches all over not hives; ok w/ it again if needed as well as other opioids; no complications w/ Lortab, Percocet, etc.  Other reaction(s): hives       Current Outpatient Medications   Medication Sig    aspirin (ASPIRIN) 325 mg tablet Take 325 mg by mouth daily. losartan (COZAAR) 50 mg tablet TAKE 1 TABLET BY MOUTH EVERY DAY FOR 30 DAYS    estradioL (ESTRACE) 1 mg tablet     cyclobenzaprine (FLEXERIL) 10 mg tablet Take  by mouth three (3) times daily as needed for Muscle Spasm(s). cetirizine (ZYRTEC) 10 mg tablet Take  by mouth daily. No current facility-administered medications for this visit.        Past Medical History:   Diagnosis Date    Arthritis     OSTEO    Chronic pain     DJD LUMBAR; SCIATICA, PAIN IN BUTTOCK ON LEFT OR RIGHT AT DIFFERENT TIMES    IBS (irritable bowel syndrome)     Mitral valve prolapse     Nausea & vomiting     RECENT PRE-OP ANTI-EMETIC EFFECTIVE        Past Surgical History:   Procedure Laterality Date    HX DILATION AND CURETTAGE      HX HEENT  11YO    STRABISMUS REPAIR    HX HYSTERECTOMY      HX ORTHOPAEDIC  9    KNEE ARTHROSCOPY LEFT    HX ORTHOPAEDIC      STAPLE IN L SHOULDER    IR INJ FORAMIN EPID LUMB ANES/STER SNGL  2020    IR INJ SPINE THER SUBST LUM/SAC W MARIA ESTHER Gomez  2019 Orders for admission, patient is aware of plan and ready to go upstairs. Any questions, please call ED RN Adrian at extension [phone].     Patient Covid vaccination status: Fully vaccinated     COVID Test Ordered in ED: None    COVID Suspicion at Admission: N/A    Running Infusions:      Mental Status/LOC at time of transport: A&O*4/4    Other pertinent information:   CIWA score: N/A   NIH score:  N/A         Family History   Problem Relation Age of Onset    Heart Surgery Mother     Arthritis-rheumatoid Mother     Cancer Father         LUNG - SMOKER    Stroke Brother 36        CEREBRAL ANEURYSM    Other Brother         PSORIATIC ARTHRITIS    Arthritis-rheumatoid Maternal Aunt         Social History     Socioeconomic History    Marital status: SINGLE     Spouse name: Not on file    Number of children: Not on file    Years of education: Not on file    Highest education level: Not on file   Occupational History    Not on file   Tobacco Use    Smoking status: Never    Smokeless tobacco: Never   Substance and Sexual Activity    Alcohol use: Yes     Alcohol/week: 0.8 standard drinks     Types: 1 Glasses of wine per week    Drug use: Not on file    Sexual activity: Not on file   Other Topics Concern    Not on file   Social History Narrative    Not on file     Social Determinants of Health     Financial Resource Strain: Not on file   Food Insecurity: Not on file   Transportation Needs: Not on file   Physical Activity: Not on file   Stress: Not on file   Social Connections: Not on file   Intimate Partner Violence: Not on file   Housing Stability: Not on file       Review of Systems   Musculoskeletal:         Left knee      Vitals: There were no vitals taken for this visit. There is no height or weight on file to calculate BMI. Ortho Exam     Left knee: Incision is healed well. She has minimal effusion significant decrease compared to prior exam.  She has 127 degrees of flexion she is terminal extension. I am unable to recreate her pain to palpation she has no instability. Patella is tracking well. Neurovascular examinations intact. ASSESSMENT/PLAN:    Patient is progressing well. I would suggest another 4 weeks of physical therapy to advance her stability and strength. We will keep an eye on this area of tenderness it is most likely soft tissue healing. I suspect it will go away.   Patient is to return to the office in 4 weeks.         Nay Pike MD

## 2024-11-16 NOTE — ED PROVIDER NOTES
This is a 44-year-old male past medical history of end-stage renal disease on dialysis, last dialysis yesterday, bipolar, asthma, hyperlipidemia, hypertension, MI, migraines, TIA, status post DVT/PE 1/23 status post 6 months Eliquis, NSTEMI 4/23 with cath showing no significant coronary artery disease.,  Severe MR status post mitral valve replacement 1994, redo robotically 2022, heart failure recent hospital admission 11/6 through 11/9/2024 hospitalized for aphasia, cognitive dysfunction no evidence stroke was found presents with multiple complaints.  Patient states after dialysis yesterday went to the grocery store and while in the grocery reviewed severe lower abdominal pain that dropped into his knees.  It happened twice so he went home.  He states he laid on the couch most the night when he got up this morning and the pain again returned at about 9 3 this morning had a voluminous episode of bright red blood per rectum x 1 with 3 small clots that were about the size of a quarter.  He continues to have burning lower abdominal pain in addition he is at chest pain has been coming intermittently throughout the day which he describes as sharp and radiating into his chest in the midline and lasting only a few seconds.  He denies shortness of breath.  No nausea or vomiting.  No hematemesis.  He presents here for further evaluation.        GENERAL: Awake, alert oriented x3, nontoxic appearing.  Appears uncomfortable.  SKIN: Normal, warm, and dry.  HEENT:  Pupils equally round and reactive to light. Conjuctiva clear.  Oropharynx is clear and moist.   Lungs: Clear to auscultation bilaterally with no rales, no retractions, and no wheezing.  HEART:  Regular rate and rhythm. S1 and S2. No murmurs, no rubs or gallops.   ABDOMEN: Soft, tender in the lower abdomen and nondistended. Normoactive bowel sounds. No rebound. No guarding.   Rectal: Bright red blood on glove  EXTREMITIES: Warm with brisk capillary refill.     Basic labs  were obtained.  CBC: White blood cell count 7.0.  Hemoglobin 9.9.  Previous hemoglobin 11.0 on 2024.  Platelet 263.  CMP: BUN 57.  Creatinine 0.59.  Glucose 141.  Bicarb 25.  Lipase 56.    Protonix was given.  Baby aspirin given.    CT scan abdomen pelvis without contrast showed some colonic diverticulosis.    Troponin elevated 2352  Repeat troponin 2338      Patient's troponins are chronically low but not this high.  Previous troponin  was 474    2D echo 3/10/2024 shows EF of 55 to 60%.    EKG    Rate, intervals and axes as noted on EKG Report.  Rate: 93  Rhythm: Sinus Rhythm  Reading: No acute ischemic changes.  Similar in appearance to EKG from 2024    Patient's blood pressure was elevated and was given IV labetalol 20 mg IV push.    Patient is aware of plan for admission.  He is being admitted for elevated troponin rule out acute coronary syndrome.  No heparin at this time due to rectal bleeding.  CT scan showed no obvious etiology for rectal bleeding.  Possible diverticular bleed.  He is to remain n.p.o. most likely undergo colonoscopy tomorrow.  Plan for admission discussed with patient.  Expresses understanding.  Patient was transferred via ambulance for direct admission to [facility].    Case discussed with [facility] cardiology Dr. Kathleen.  Will hold heparin for now.  They will evaluate him tomorrow.      Case discussed with [facility] GI Dr. Salinas agrees with current management.    Case discussed with [facility] hospitalist Dr. Joseph      EKG    Rate, intervals and axes as noted on EKG Report.  Rate: 88  Rhythm: Sinus Rhythm  Reading: No acute changes from EKG #1              Consult Orders   Consult to Cardiology [[id number]] ordered by Phong Freedman DO at 24 0835          Expand All Collapse All    [facility] Cardiology  Consultation Note              Godwin Fonseca Patient Status:  Inpatient    1980 MRN [id number]   Location [facility] 7NE-A Attending Jasmina  Phong MOY, DO   Hosp Day # 1 PCP Prieto Novak MD      Reason for consult: Troponin     Primary cardiologist: Devan Rankin MD      History of Present Illness:  Godwin Fonseca is a 44 year old male with ESRD on HD, H/o DVT/PE 1/23 s/p Eliquis x 6 mo, \"NSTEMI\" 4/23 with cath showing no sig CAD, h/o severe MR s/p MVR 1994, redo complex robotically assisted MVR ([facility] 2022), chronic HFpEF, h/o TIA, HTN, bipolar disease, who presented to [facility] on 9/15/2024 with SOB. Missed HD last Friday due to abdominal pain, vomiting and diarrhea. Since then has been SOB. He is at \"dry wt\" ~ 245-250 lbs. Denies LE edema. No CP. No fevers. BP has been high 180-190s. Last week had lower abd pain and dx'd with prostatitis, was taking levaquin. We are consulted for troponin ~ 500s. Again denies CP. Had cath 4/23 for + troponin that showed no sig CAD.   Impression:  44 year old male presenting with SOB after missing HD session (last 5 days PTA)  Mild troponin elevation - chronic, does not reflect ACS  \"NSTEMI\" 4/23 with cath showing no sig CAD  H/o severe MR s/p MVR 1994, redo complex robotically assisted MVR ([facility] 2022)  Chronic HFpEF  ESRD on HD  H/o DVT/PE 1/23 s/p Eliquis x 6 mo  Primary hyperaldosteronism  HTN - not controlled  H/o TIA  Bipolar disease  H/o cocaine use  H/o RLL PNA (8/8/24) treated with Abx, prednisone  Melena - C-scope, EGD no source, planned for capsule endoscopy  Chronic anemia     Recommendations:  HD per renal for volume and BP management  Continue usual home antihypertensive regimen for now with IV prn   Not on ASA due to recent GIB     Thank you for allowing our practice to participate in the care of your patient. Please do not hesitate to contact me if you have any questions.     Micheal Campos MD  Interventional Cardiology  [facility]  Office: [phone]               Reviewed  capsule endoscopy 10/15/2024 as below    IMPRESSION:  There is no evidence of active gastrointestinal  bleeding on this examination.     RECOMMENDATIONS:  At the present time, would recommend continuing to monitor the patient's hemoglobin.  Follow up with referring physician                Electronically signed by Chung Alfaro MD at 10/17/2024 10:11 AM               ASSESSMENT AND PLAN:   Acute on chronic anemia  Chronic rectal bleeding  End-stage renal disease on hemodialysis     Recommendations  - No active bleeding at this time, recommend follow up with the established gastroenterologist for outpatient capsule endoscopy of the small bowel ( Dr [facility])  - Recommend outpatient follow-up with surgery for hemorrhoidectomy   - regular diet   - already had EGD and colonoscopy with       Dr Gallardo. No plans for repeat EGD and colonoscopy      The patient indicates understanding of these issues and agrees to the plan.  Elliott Shane DO  10/7/2024  12:06 PM               Electronically signed by Elliott Shane DO at 10/7/2024 12:09 PM

## 2024-11-16 NOTE — ED INITIAL ASSESSMENT (HPI)
Abdominal pain S/P dialysis yesterday. 10/10 LLQ burning and stabbing pain that radiates to chest. Rectal bleeding noted at home today

## 2024-11-16 NOTE — ED PROVIDER NOTES
Patient Seen in: [facility] In [place]      History     Chief Complaint   Patient presents with    Abdominal Pain     Stated Complaint: abd pain - rectal bleeding - chest pain    Subjective:   HPI    Patient is a 44-year-old male with asthma, bipolar, history of GI bleed, arrhythmia, renal disease (hemodialysis yesterday), DM, HTN, COPD, PE, and history of MI 2016 here with multiple complaints including lower abdominal pain, GI/rectal bleeding, chest pain and SOB.      Abdominal pain started yesterday after HD.  Bright red blood in one stool and passed large clot. Patient states he has continued bleeding from rectum. Also experiencing lower back pain.     Lower abdominal pain restarted this moring along with chest pain and SOB.  Patient states pain is reproducible with deep inspiration.  States he feels a constant \"pressure.\"        Objective:     Past Medical History:    Anxiety state    Arrhythmia    Asthma ([facility])    Attention deficit hyperactivity disorder (ADHD)    Back problem    Bipolar 1 disorder ([facility])    CKD (chronic kidney disease) stage 3, GFR 30-59 ml/min ([facility])    Dr Meeks    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[facility])    Congestive heart disease ([facility])    COPD (chronic obstructive pulmonary disease) ([facility])    Coronary atherosclerosis    Deep vein thrombosis ([facility])    at age 17 R/T cast    Depression    Diabetes ([facility])    Dialysis patient ([facility])    Diverticulosis of large intestine    Essential hypertension    3/21 echo: severe concentric LVH with normal EF and no MR or pHTN    Extrinsic asthma, unspecified    Heart attack ([facility])    2016- angiogram- no intervention    Heart valve disease    mitral valve repair in 1994/    High blood pressure    High cholesterol    History of blood transfusion    History of mitral valve repair    Hyperlipidemia    Low back pain    tight and stiff after sweeping and mopping    LVH (left ventricular hypertrophy)    Migraines    Mixed hyperlipidemia     HDL 38 LDL  97 VLDL 57     Monoclonal gammopathy    IgG kappa     Muscle weakness    MVP (mitral valve prolapse)    Repair 1994 at [place]; echoes as recently as 3/21 show mild or trivial MR and no stenosis    Neuropathy    Osteoarthritis    hip ,knees    Pneumonia due to organism    Pulmonary embolism (HCC)    Renal disorder    Stroke (HCC)    TIA (transient ischemic attack)    Initial history of left-sided weakness and slurred speech. (+) cocaine. MRI of the brain, CT angiogram of the head and neck, and 2D echo are all unremarkable.     TMJ (dislocation of temporomandibular joint)    Troponin level elevated    Trop 60 60 47 with TIA and no CP: Lexiscan negative with EF 51    Visual impairment              No pertinent past surgical history.              No pertinent social history.                Physical Exam     ED Triage Vitals   BP 11/16/24 1236 (!) 167/109   Pulse 11/16/24 1236 93   Resp 11/16/24 1236 22   Temp 11/16/24 1307 98.7 °F (37.1 °C)   Temp src 11/16/24 1307 Oral   SpO2 11/16/24 1236 98 %   O2 Device 11/16/24 1236 None (Room air)       Current Vitals:   Vital Signs  BP: (!) 172/116  Pulse: 98  Resp: 20  Temp: 98.7 °F (37.1 °C)  Temp src: Oral    Oxygen Therapy  SpO2: 96 %  O2 Device: None (Room air)        Physical Exam  Vitals and nursing note reviewed.   Constitutional:       General: He is in acute distress.      Appearance: He is not ill-appearing.   Eyes:      Extraocular Movements: Extraocular movements intact.      Pupils: Pupils are equal, round, and reactive to light.   Cardiovascular:      Rate and Rhythm: Normal rate and regular rhythm.      Heart sounds: Normal heart sounds.   Pulmonary:      Effort: Pulmonary effort is normal.      Breath sounds: Normal breath sounds.   Abdominal:      General: Bowel sounds are normal.      Palpations: Abdomen is soft.      Tenderness: There is abdominal tenderness in the suprapubic area and left lower quadrant. There is guarding. There is no right CVA  tenderness, left CVA tenderness or rebound.   Genitourinary:     Rectum: Guaiac result positive. Internal hemorrhoid present. No anal fissure. Normal anal tone.      Comments: Visible blood around rectum          ED Course     Labs Reviewed   COMP METABOLIC PANEL (14) - Abnormal; Notable for the following components:       Result Value    Glucose 141 (*)     BUN 57 (*)     Creatinine 9.59 (*)     Calculated Osmolality 300 (*)     eGFR-Cr 6 (*)     A/G Ratio 0.9 (*)     All other components within normal limits   LIPASE - Abnormal; Notable for the following components:    Lipase 56 (*)     All other components within normal limits   CBC WITH DIFFERENTIAL WITH PLATELET - Abnormal; Notable for the following components:    RBC 3.07 (*)     HGB 9.9 (*)     HCT 29.0 (*)     All other components within normal limits   TROPONIN I HIGH SENSITIVITY - Abnormal; Notable for the following components:    Troponin I (High Sensitivity) 2,352 (*)     All other components within normal limits   LIPID PANEL   TROPONIN I HIGH SENSITIVITY     EKG    Rate, intervals and axes as noted on EKG Report.  Rate: 93  Rhythm: Sinus Rhythm  Reading: sinus, no obvious ischemia            CT ABDOMEN+PELVIS(CPT=74176)    Result Date: 11/16/2024  CONCLUSION:  1. Circumferential urinary bladder wall thickening, correlate for cystitis. 2. Colonic diverticulosis.   LOCATION:  [facility]   Dictated by (CST): Duy De La Rosa MD on 11/16/2024 at 2:47 PM     Finalized by (CST): Duy De La Rosa MD on 11/16/2024 at 2:50 PM               [facility]        Admission disposition: 11/16/2024  3:21 PM           Medical Decision Making  Differentials include but are not limited to GI bleed, diverticulitis, perforation, retroperitoneal bleed, pneumonia, MI and PE.  Patient's troponins normally elevated, more elevated here today at 2352.  Mild anemia-hemoglobin 9.9, reduced from previous 11.0 on 11/9/2024.  CT of the abdomen and pelvis unremarkable.  Patient to be admitted for  further observation, stabilization and management.    Patient presentation and plan of care reviewed and formulated with Dr. Mendoza, emergency department physician            Disposition and Plan     Clinical Impression:  1. Acute chest pain    2. Gastrointestinal hemorrhage, unspecified gastrointestinal hemorrhage type         Disposition:  Admit  11/16/2024  3:21 pm    Follow-up:  No follow-up provider specified.        Medications Prescribed:  Current Discharge Medication List              Supplementary Documentation:         Hospital Problems       Present on Admission  Date Reviewed: 11/9/2024            ICD-10-CM Noted POA    * (Principal) Acute chest pain R07.9 4/5/2023 Unknown

## 2024-11-17 ENCOUNTER — APPOINTMENT (OUTPATIENT)
Dept: CV DIAGNOSTICS | Facility: HOSPITAL | Age: 44
End: 2024-11-17
Attending: INTERNAL MEDICINE
Payer: MEDICARE

## 2024-11-17 LAB
ALBUMIN SERPL-MCNC: 3.9 G/DL (ref 3.2–4.8)
ALBUMIN/GLOB SERPL: 1.4 {RATIO} (ref 1–2)
ALP LIVER SERPL-CCNC: 91 U/L
ALT SERPL-CCNC: 17 U/L
ANION GAP SERPL CALC-SCNC: 9 MMOL/L (ref 0–18)
AST SERPL-CCNC: 20 U/L (ref ?–34)
BASOPHILS # BLD AUTO: 0.1 X10(3) UL (ref 0–0.2)
BASOPHILS NFR BLD AUTO: 1.6 %
BILIRUB SERPL-MCNC: 0.4 MG/DL (ref 0.3–1.2)
BILIRUB UR QL STRIP.AUTO: NEGATIVE
BUN BLD-MCNC: 54 MG/DL (ref 9–23)
CALCIUM BLD-MCNC: 8.8 MG/DL (ref 8.7–10.4)
CHLORIDE SERPL-SCNC: 104 MMOL/L (ref 98–112)
CLARITY UR REFRACT.AUTO: CLEAR
CO2 SERPL-SCNC: 26 MMOL/L (ref 21–32)
CREAT BLD-MCNC: 9.59 MG/DL
EGFRCR SERPLBLD CKD-EPI 2021: 6 ML/MIN/1.73M2 (ref 60–?)
EOSINOPHIL # BLD AUTO: 0.52 X10(3) UL (ref 0–0.7)
EOSINOPHIL NFR BLD AUTO: 8.2 %
ERYTHROCYTE [DISTWIDTH] IN BLOOD BY AUTOMATED COUNT: 13.7 %
GLOBULIN PLAS-MCNC: 2.8 G/DL (ref 2–3.5)
GLUCOSE BLD-MCNC: 100 MG/DL (ref 70–99)
GLUCOSE UR STRIP.AUTO-MCNC: NORMAL MG/DL
HCT VFR BLD AUTO: 28.4 %
HGB BLD-MCNC: 9.4 G/DL
IMM GRANULOCYTES # BLD AUTO: 0.01 X10(3) UL (ref 0–1)
IMM GRANULOCYTES NFR BLD: 0.2 %
KETONES UR STRIP.AUTO-MCNC: NEGATIVE MG/DL
LEUKOCYTE ESTERASE UR QL STRIP.AUTO: NEGATIVE
LYMPHOCYTES # BLD AUTO: 1.59 X10(3) UL (ref 1–4)
LYMPHOCYTES NFR BLD AUTO: 24.9 %
MAGNESIUM SERPL-MCNC: 2 MG/DL (ref 1.6–2.6)
MCH RBC QN AUTO: 31.8 PG (ref 26–34)
MCHC RBC AUTO-ENTMCNC: 33.1 G/DL (ref 31–37)
MCV RBC AUTO: 95.9 FL
MONOCYTES # BLD AUTO: 0.8 X10(3) UL (ref 0.1–1)
MONOCYTES NFR BLD AUTO: 12.5 %
NEUTROPHILS # BLD AUTO: 3.36 X10 (3) UL (ref 1.5–7.7)
NEUTROPHILS # BLD AUTO: 3.36 X10(3) UL (ref 1.5–7.7)
NEUTROPHILS NFR BLD AUTO: 52.6 %
NITRITE UR QL STRIP.AUTO: NEGATIVE
OSMOLALITY SERPL CALC.SUM OF ELEC: 303 MOSM/KG (ref 275–295)
PH UR STRIP.AUTO: 8.5 [PH] (ref 5–8)
PHOSPHATE SERPL-MCNC: 7.5 MG/DL (ref 2.4–5.1)
PLATELET # BLD AUTO: 223 10(3)UL (ref 150–450)
POTASSIUM SERPL-SCNC: 4.4 MMOL/L (ref 3.5–5.1)
PROT SERPL-MCNC: 6.7 G/DL (ref 5.7–8.2)
PROT UR STRIP.AUTO-MCNC: 100 MG/DL
RBC # BLD AUTO: 2.96 X10(6)UL
RBC UR QL AUTO: NEGATIVE
SODIUM SERPL-SCNC: 139 MMOL/L (ref 136–145)
SP GR UR STRIP.AUTO: 1.01 (ref 1–1.03)
UROBILINOGEN UR STRIP.AUTO-MCNC: NORMAL MG/DL
WBC # BLD AUTO: 6.4 X10(3) UL (ref 4–11)

## 2024-11-17 PROCEDURE — 83735 ASSAY OF MAGNESIUM: CPT | Performed by: INTERNAL MEDICINE

## 2024-11-17 PROCEDURE — 93306 TTE W/DOPPLER COMPLETE: CPT | Performed by: INTERNAL MEDICINE

## 2024-11-17 PROCEDURE — 85025 COMPLETE CBC W/AUTO DIFF WBC: CPT | Performed by: INTERNAL MEDICINE

## 2024-11-17 PROCEDURE — 81001 URINALYSIS AUTO W/SCOPE: CPT | Performed by: HOSPITALIST

## 2024-11-17 PROCEDURE — 94640 AIRWAY INHALATION TREATMENT: CPT

## 2024-11-17 PROCEDURE — 96376 TX/PRO/DX INJ SAME DRUG ADON: CPT

## 2024-11-17 PROCEDURE — 80053 COMPREHEN METABOLIC PANEL: CPT | Performed by: INTERNAL MEDICINE

## 2024-11-17 PROCEDURE — 84100 ASSAY OF PHOSPHORUS: CPT | Performed by: INTERNAL MEDICINE

## 2024-11-17 RX ORDER — HYDROCORTISONE ACETATE 25 MG/1
25 SUPPOSITORY RECTAL 2 TIMES DAILY
Status: DISCONTINUED | OUTPATIENT
Start: 2024-11-17 | End: 2024-11-19

## 2024-11-17 NOTE — H&P
.CC:   Chief Complaint   Patient presents with    Abdominal Pain        PCP: Adrian Small MD    History of Present Illness: Patient is a 44 year old male with PMH sig for ESRD on HD, COPD, hypertension, TIA, mitral valve repair, anxiety depression who presented with a couple episodes of rectal bleeding again.    He has had rectal bleeding over the last few months, thought to be from hemorrhoids. Egd/c-scope at OSH, and capsule here in August. C-scope with diverticulosis and hemorrhoids.  He was recommended previously to use Anusol suppositories but had not been compliant with this.  He has been trying to use Metamucil but does not use it regularly as sometimes he gets too many bowel movements.    He reports discomfort along the left lower abdomen over specific nodular area but he also has reported lower abdominal discomfort and dysuria.  He reports that he had been on Levaquin for 2 months for possible prostatitis as prescribed by his PCP.  He felt that this had helped at the time but the symptoms have recurred.  He also reports that he had been seen by urology and the cystoscope had been done and was told that everything looked okay at that time.  He does urinate twice a day still.      PMH  Past Medical History:    Anxiety state    Arrhythmia    Asthma (HCC)    Attention deficit hyperactivity disorder (ADHD)    Back problem    Bipolar 1 disorder (Formerly [facility])    CKD (chronic kidney disease) stage 3, GFR 30-59 ml/min (Formerly [facility])    Dr Meeks    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Formerly [facility])    Congestive heart disease (HCC)    COPD (chronic obstructive pulmonary disease) (Formerly [facility])    Coronary atherosclerosis    Deep vein thrombosis (Formerly [facility])    at age 17 R/T cast    Depression    Diabetes (HCC)    Dialysis patient (Formerly [facility])    Diverticulosis of large intestine    Essential hypertension    3/21 echo: severe concentric LVH with normal EF and no MR or pHTN    Extrinsic asthma, unspecified    Heart attack (HCC)    2016- angiogram- no intervention    Heart  valve disease    mitral valve repair in 1994/    High blood pressure    High cholesterol    History of blood transfusion    History of mitral valve repair    Hyperlipidemia    Low back pain    tight and stiff after sweeping and mopping    LVH (left ventricular hypertrophy)    Migraines    Mixed hyperlipidemia     HDL 38 LDL 97 VLDL 57     Monoclonal gammopathy    IgG kappa     Muscle weakness    MVP (mitral valve prolapse)    Repair 1994 at [facility]; echoes as recently as 3/21 show mild or trivial MR and no stenosis    Neuropathy    Osteoarthritis    hip ,knees    Pneumonia due to organism    Pulmonary embolism (HCC)    Renal disorder    Stroke (HCC)    TIA (transient ischemic attack)    Initial history of left-sided weakness and slurred speech. (+) cocaine. MRI of the brain, CT angiogram of the head and neck, and 2D echo are all unremarkable.     TMJ (dislocation of temporomandibular joint)    Troponin level elevated    Trop 60 60 47 with TIA and no CP: Lexiscan negative with EF 51    Visual impairment        PSH  Past Surgical History:   Procedure Laterality Date    Av fistula revision, open Left     Cabg      Colonoscopy N/A 03/26/2023    Procedure: COLONOSCOPY;  Surgeon: Heath Vu MD;  Location:  ENDOSCOPY    Colonoscopy N/A 12/30/2023    Procedure: COLONOSCOPY with cold snare polypectomy and forcep polypectomy;  Surgeon: Ousmane Suarez MD;  Location:  ENDOSCOPY    Colonoscopy & polypectomy  2019    Egd  2019    Duodenitis. Biopsied. EUS for weight loss was negative    Heart surgery      Hernia surgery  08/17/2022    Dr Barnes    Laminectomy,>2 sgmt,lumbar  09/06/2018    L4-L5 Decomp Discectomy ROEM L4-L5    Mitralplasty w cp bypass  1994    [facility]: Repair    Repair rotator cuff,chronic Left     torn and had a ruptured bicep    Sinus surgery        Spine surgery procedure unlisted      Valve repair  1994    mitral valve        ALL:  Allergies[1]     Home Medications:  Medications  Taking[2]      Soc Hx  Social History     Tobacco Use    Smoking status: Former     Current packs/day: 0.00     Average packs/day: 1 pack/day for 27.0 years (27.0 ttl pk-yrs)     Types: Cigarettes     Start date: 1995     Quit date: 2022     Years since quittin.7     Passive exposure: Never    Smokeless tobacco: Never   Substance Use Topics    Alcohol use: No        Fam Hx  Family History   Problem Relation Age of Onset    Hypertension Father     Alcohol and Other Disorders Associated Father     Substance Abuse Father         cocaine    Dementia Father     Cancer Father         lung    Diabetes Mother     Cancer Mother         multiple myeloma    Hypertension Mother     Anxiety Maternal Aunt     Depression Maternal Aunt     Anxiety Maternal Aunt     Depression Maternal Aunt     Bipolar Disorder Maternal Aunt     Diabetes Maternal Grandmother     Hypertension Maternal Grandmother     Cancer Maternal Grandfather         stomach cancer    Diabetes Maternal Grandfather     Hypertension Maternal Grandfather     Alcohol and Other Disorders Associated Maternal Grandfather     Hypertension Paternal Grandmother     Hypertension Paternal Grandfather     Cancer Sister         uterine and ovarian    Hypertension Sister     Cancer Maternal Uncle         lung    Cancer Paternal Aunt         throat       Review of Systems  Comprehensive ROS reviewed and negative except for what's stated above.       OBJECTIVE:  /77 (BP Location: Right arm)   Pulse 84   Temp 98.6 °F (37 °C) (Oral)   Resp 18   Ht 6' 2\" (1.88 m)   Wt 240 lb 15.4 oz (109.3 kg)   SpO2 100%   BMI 30.94 kg/m²     Gen- NAD, appears stated age  HEENT- NCAT, anicteric sclera, MMM, OP clear  Lymph- no cervical LAD  CV- RRR no murmurs. No PAMELA  Lungs- CTAB, good respiratory effort  Abd- soft, +ttp lower abd shy over suprapubic area and LLQ where small nodular area is felt  Derm- no rashes  MSK- good muscle strength and tone, no joint swelling  Neuro-  A&OX3, no focal deficits      Diagnostic Data:    CBC/Chem  Recent Labs   Lab 11/16/24  1300 11/17/24  0657   WBC 7.0 6.4   HGB 9.9* 9.4*   MCV 94.5 95.9   .0 223.0       Recent Labs   Lab 11/16/24  1300 11/17/24  0443    139   K 4.6 4.4    104   CO2 25.0 26.0   BUN 57* 54*   CREATSERUM 9.59* 9.59*   * 100*   CA 8.6 8.8   MG  --  2.0   PHOS  --  7.5*       Recent Labs   Lab 11/16/24  1300 11/17/24  0443   ALT 31 17   AST 23 20   ALB 3.6 3.9       No results for input(s): \"TROP\" in the last 168 hours.    Additional Diagnostics: personally reviewed EKG- nsr, no st/t abn      Radiology: CT ABDOMEN+PELVIS(CPT=74176)    Result Date: 11/16/2024  PROCEDURE:  CT ABDOMEN+PELVIS (CPT=74176)  COMPARISON:  [facility], CT, CT ABDOMEN+PELVIS(CONTRAST ONLY)(CPT=74177), 10/06/2024, 10:09 PM.  INDICATIONS:  abd pain - rectal bleeding - chest pain  TECHNIQUE:  Unenhanced multislice CT scanning was performed from the dome of the diaphragm to the pubic symphysis.  Dose reduction techniques were used. Dose information is transmitted to the ACR (American College of Radiology) NRDR (National Radiology Data Registry) which includes the Dose Index Registry.  PATIENT STATED HISTORY: (As transcribed by Technologist)  Abdominal pain S/P dialysis yesterday. 10/10 LLQ burning and stabbing pain that radiates to chest. Rectal bleeding noted at home today    FINDINGS:  LIVER:  No enlargement, atrophy, abnormal density, or significant focal lesion.  BILIARY:  No visible dilatation or calcification.  PANCREAS:  No lesion, fluid collection, ductal dilatation, or atrophy.  SPLEEN:  No enlargement or focal lesion.  KIDNEYS:  No mass, obstruction, or calcification.  ADRENALS:  No mass or enlargement.  AORTA/VASCULAR:    Unremarkable as seen on non-contrast imaging. RETROPERITONEUM:  No mass or adenopathy.  BOWEL/MESENTERY:  No dilated bowel or wall thickening.  Colonic diverticulosis, without diverticulitis.  Normal appendix.  ABDOMINAL WALL:  No mass or hernia.  URINARY BLADDER:  Circumferential urinary bladder wall thickening. PELVIC NODES:  No adenopathy.  PELVIC ORGANS:  No visible mass.  Pelvic organs appropriate for patient age.  BONES:  No bony lesion or fracture.  LUNG BASES:  Right basilar atelectasis, similar to prior. OTHER:  Negative.             CONCLUSION:  1. Circumferential urinary bladder wall thickening, correlate for cystitis. 2. Colonic diverticulosis.   LOCATION:  [id number]   Dictated by (CST): Duy De La Rosa MD on 11/16/2024 at 2:47 PM     Finalized by (CST): Duy De La Rosa MD on 11/16/2024 at 2:50 PM       MRI BRAIN (CPT=70551)    Result Date: 11/6/2024  PROCEDURE:  MRI BRAIN (CPT=70551)  COMPARISON:  [facility] , MR, MRI BRAIN (CPT=70551), 8/24/2023, 9:12 PM.  [facility] , MR, MRI SPINE CERVICAL (CPT=72141), 10/19/2024, 8:25 PM.  [place], CT, CT BRAIN OR HEAD (94003), 11/06/2024, 1:13 PM.  INDICATIONS:  slurred speech.  History of TIA.  Shaking and stuttering.  TECHNIQUE:  MRI of the brain was performed with multi-planar T1, T2-weighted images with FLAIR sequences and diffusion weighted images without infusion.  PATIENT STATED HISTORY: (As transcribed by Technologist)  Patient has history of TIA and states he has been having headache and neck pain with uncontrollable shaking and stuttering which has been worse for the past 2 days    FINDINGS:   The ventricles and sulci are normal in size for the patient's age.  No mass-effect, midline shift, hydrocephalus, herniation, extra-axial fluid collections, or signs of acute territorial infarct, or brain tumor.  No abnormality of midline structures. No sign of restricted diffusion. No pathologic gradient susceptibility pattern.  Flow voids are present within the internal carotid and basilar arteries. No sign of acute fluid in the paranasal sinuses. Postinflammatory changes are present within the paranasal sinuses, without findings indicating acute sinusitis.            CONCLUSION:   No abnormalities are seen.   LOCATION:  [id number]   Dictated by (CST): Joe London MD on 11/06/2024 at 3:44 PM     Finalized by (CST): Joe London MD on 11/06/2024 at 3:49 PM       CT BRAIN OR HEAD (CPT=70450)    Result Date: 11/6/2024  PROCEDURE:  CT BRAIN OR HEAD (60247)  COMPARISON:  [facility], CT, CT BRAIN OR HEAD ([id number]), 3/16/2024, 1:45 AM.  INDICATIONS:  sob, dizziness, slurring  TECHNIQUE:  Noncontrast CT scanning is performed through the brain. Dose reduction techniques were used. Dose information is transmitted to the ACR (American College of Radiology) NRDR (National Radiology Data Registry) which includes the Dose Index Registry.  PATIENT STATED HISTORY: (As transcribed by Technologist)  Patient states he was at his neurologist and has had slurring of speech since Monday and got worse today with tremors.    FINDINGS:  Motion artifact slightly limits assessment. VENTRICLES/SULCI:  Ventricles and sulci are normal in size.  INTRACRANIAL:  There are no abnormal extraaxial fluid collections.  There is no midline shift.  There are no intraparenchymal brain abnormalities.  There is nothing specific for acute infarct.  There is no hemorrhage or mass lesion.  SINUSES:           No sign of acute sinusitis.  MASTOIDS:          No sign of acute inflammation. SKULL:             No evidence for fracture or osseous abnormality. OTHER:             None.            CONCLUSION:  Motion artifact slightly limits assessment.  Within the limitations of the exam there is no significant midline shift or mass effect.  No acute intracranial hemorrhage.  If there is persistent clinical concern then consider MRI.     LOCATION:  [facility]   Dictated by (CST): Smith Randle MD on 11/06/2024 at 1:25 PM     Finalized by (CST): Smith Randle MD on 11/06/2024 at 1:29 PM       XR CHEST AP PORTABLE  (CPT=71045)    Result Date: 11/6/2024  PROCEDURE:  XR CHEST AP PORTABLE  (CPT=71045)  TECHNIQUE:  AP chest radiograph was obtained.  COMPARISON:   [facility], XR, XR CHEST AP PORTABLE  (CPT=71045), 10/17/2024, 7:43 AM.  INDICATIONS:  sob, dizziness, slurring  PATIENT STATED HISTORY: (As transcribed by Technologist)  Patient came in the [facility] today due to shortness of breath, dizziness and slurring of his words.  Patient offered no additional history.    FINDINGS:  Patient rotated to right.  Tunneled right internal jugular hemodialysis catheter remains in place.  Normal heart size and pulmonary vascularity.  Decreased right basilar opacity.  No new pulmonary opacity.  Mild blunting of right costophrenic angle.            CONCLUSION:  Decreased right basilar pneumonia.  Small right pleural effusion.   LOCATION:  [facility]      Dictated by (CST): Ricki Zaragoza MD on 11/06/2024 at 1:01 PM     Finalized by (CST): Ricki Zaragoza MD on 11/06/2024 at 1:02 PM       US DUPLEX SCAN HEMODIALYSIS ACCESS  (CPT=93990)    Result Date: 10/25/2024  PROCEDURE:  US DUPLEX SCAN HEMODIALYSIS ACCESS  (CPT=93990)  COMPARISON:  [facility], US DUPLEX SCAN HEMODIALYSIS ACCESS  (CPT=93990), 9/18/2024, 2:38 PM.  INDICATIONS:  angiogram on left forearm today co of finger numbness, denies taking pain medication pta, bleeding from rectum an hour ago, brusing on right forearm  TECHNIQUE:  Real time gray scale and duplex color Doppler sonography was used to evaluate the left upper extremity arterial and venous system. Being noted two-dimensional images of the vascular structures, Doppler spectral analysis, and color Doppler imaging were performed  PATIENT STATED HISTORY: (As transcribed by Technologist)  Patient has a left forearm AVF. States he had an angiogram earlier today to cannulate the fistula. Now states hand pain/numbness.    FINDINGS:   Left UPPER EXTREMITY ARTERIAL: Proximal brachial: diameter 8 mm, velocity 100 centimeters/sec, Mid brachial: diameter 8 mm, velocity 125 centimeters/sec, Distal brachial: diameter 8 mm, velocity 85 centimeters/sec, Proximal radial: diameter 6 mm, velocity  134 centimeters/sec Mid radial: diameter 6 mm, velocity 102 centimeters/sec Distal radial: diameter 4 mm, velocity 107 centimeters/sec Fistula/graft arterial anastomosis: diameter 5 mm, velocity 281 centimeters/sec Proximal graft: diameter 6 mm, velocity 216 centimeters/sec Mid graft: diameter 6 mm, velocity 100 centimeters/sec Distal graft: diameter 7 mm, velocity 86 centimeters/sec  Left UPPER EXTREMITY VENOUS: Proximal arm cephalic vein: diameter 3 mm, velocity 6 centimeters/sec Mid arm cephalic vein:  diameter 3 mm, velocity 8 centimeters/sec, Distal arm cephalic vein: diameter 4 mm, velocity 8 centimeters/sec Proximal forearm basilic vein: diameter 5 mm, velocity 14 centimeters/sec Mid forearm basilic vein: diameter 6 mm, velocity 14 centimeters/sec Distal basilic vein: diameter 6 mm, velocity 11 centimeters/sec  OTHER FINDINGS:            CONCLUSION:  The left radial artery to cephalic vein fistula is patent.  There is mild elevation of velocities at the arterial anastomosis could indicate a mild/moderate stenosis.   LOCATION:  [facility]    Dictated by (CST): Jaycob Cassidy MD on 10/25/2024 at 5:58 AM     Finalized by (CST): Jaycob Cassidy MD on 10/25/2024 at 6:03 AM       MRI SPINE CERVICAL (CPT=72141)    Result Date: 10/19/2024  PROCEDURE:  MRI SPINE CERVICAL (CPT=72141)  COMPARISON:  None.  INDICATIONS:  Worsening cervical radiculopathy pain, UE weakness.  No IV contrast.  TECHNIQUE:  Multiplanar T1 and T2 weighted images including fat suppression sequences.  Images acquired in sagittal and axial planes.   PATIENT STATED HISTORY: (As transcribed by Technologist)  Patient is being evaluated for Worsening cervical radiculopathy pain, UE weakness.    FINDINGS:  Motion artifact is present. CERVICAL DISC LEVELS: C2-C3:  Mild facet hypertrophy.  No spinal canal or neural foraminal stenosis. C3-C4:  Minimal posterior acid disc complex with bilateral facet hypertrophy.  No spinal canal or neural foraminal stenosis.  C4-C5:  Minimal posterior osteophyte disc complex with bilateral facet hypertrophy.  No significant spinal canal stenosis.  Mild bilateral neural foraminal stenosis. C5-C6:  Minimal posterior acid disc complex with bilateral facet hypertrophy.  Mild to moderate right-sided neural foraminal stenosis.  No spinal canal stenosis. C6-C7:  Is minimal posterior osteophyte disc complex with left paracentral predominance.  No significant spinal canal stenosis.  No significant neural foraminal stenosis. C7-T1:  Mild facet hypertrophy.  No spinal canal or neural foraminal stenosis.  CRANIOCERVICAL AREA:  Normal foramen magnum with no Chiari malformation.  PARASPINAL AREA:  Normal with no visible mass.  BONY STRUCTURES:  No fracture, pars defect, or osseous lesion.  Diffuse dark T1 and T2 marrow signal noted within the vertebral bodies which is nonspecific but may represent marrow conversion or possible myeloproliferative disease. CORD:  Normal caliber, contour, and signal intensity.             CONCLUSION:  Mild degenerative changes as described above.   LOCATION:  [facility]   Dictated by (CST): Quinn Bernal MD on 10/19/2024 at 9:12 PM     Finalized by (CST): Quinn Bernal MD on 10/19/2024 at 9:19 PM          Available outpatient records reviewed--    ASSESSMENT / PLAN:   44-year-old man with ESRD on HD, well-known to service, who presents with recurrent bright red blood per rectum.    Brbpr  -Seen by GI.  Has had recent endoscopies so no further are recommended at this time.  Suspect the bleeding is related to hemorrhoids and again recommend regular suppository use.  Patient agreeable to it at this time and they have been ordered  - Also discussed resuming high-fiber diet.  Nutrition consult might be helpful and patient encouraged to continue use of fiber supplements, either reducing Metamucil frequency if it is too much or trying perhaps a different type of fiber supplement.  -Hemoglobin stable    High trops  H/o severe  MR s/p MVR, redo complex robotically assisted MVR 2022  Chronic HFpEF  - h/o cath 2023 with mild irregularities  - EKG okay  - echo today  - has h/o chronic trop elevations    Finding of possible \"cystitis\" on imaging  - seen on previous CT as well  -Patient has had cystoscopy that looked okay in the past per his report.  He also has been treated for possible prostatitis.  He does seem to have more chronic discomfort in the suprapubic area.  - Will check UA here    LLQ lump- lipoma versus other subcutaneous nodule (?could it be from previous heparin injection site)  -No findings on imaging to suggest a deeper process.  And he seems to be tender over a specific site.  - Would trial lidocaine patch for supportive management over the area.    Esrd on hd- mwf.  As patient will likely need to remain in hospital overnight, will ask nephrology to see to help coordinate dialysis in the hospital.    SCDs        **Certification      PHYSICIAN Certification of Need for Inpatient Hospitalization - Initial Certification    Patient will require inpatient services that will reasonably be expected to span two midnight's based on the clinical documentation in H+P.   Based on patients current state of illness, I anticipate that, after discharge, patient will require TBD.      Laila Ryder MD  [facility]  Pager [phone]  Answering Service number: [phone]         [1]   Allergies  Allergen Reactions    Hydrochlorothiazide RASH and HIVES   [2]   Outpatient Medications Marked as Taking for the 11/16/24 encounter (Hospital Encounter)   Medication Sig Dispense Refill    hydrALAZINE 50 MG Oral Tab Take 1 tablet (50 mg total) by mouth every 8 (eight) hours. 90 tablet 0    memantine 5 MG Oral Tab Take 1 tablet (5 mg total) by mouth 2 (two) times daily.      Lisdexamfetamine Dimesylate 70 MG Oral Cap Take 1 capsule (70 mg total) by mouth every morning.      albuterol 108 (90 Base) MCG/ACT Inhalation Aero Soln Inhale 2 puffs into  the lungs every 6 (six) hours as needed for Wheezing.      tamsulosin 0.4 MG Oral Cap Take 1 capsule (0.4 mg total) by mouth daily.      ARIPiprazole 15 MG Oral Tab Take 1 tablet (15 mg total) by mouth daily.      buPROPion  MG Oral Tablet 24 Hr Take 1 tablet (150 mg total) by mouth daily.      lamoTRIgine (LAMICTAL) 150 MG Oral Tab Take 1 tablet (150 mg total) by mouth daily. (Patient taking differently: Take 1 tablet (150 mg total) by mouth daily. Reduced to 100mg/day for weening) 7 tablet 0    FLUoxetine HCl 40 MG Oral Cap Take 1 capsule (40 mg total) by mouth daily. 7 capsule 0    RENVELA 800 MG Oral Tab Take 1 tablet (800 mg total) by mouth 3 (three) times daily with meals.      losartan 100 MG Oral Tab Take 1 tablet (100 mg total) by mouth daily. Hold if systolic blood pressure <130      NIFEdipine ER 60 MG Oral Tablet 24 Hr Take 1 tablet (60 mg total) by mouth in the morning and 1 tablet (60 mg total) before bedtime. Hold if systolic blood pressure <130.      carvedilol 25 MG Oral Tab Take 1 tablet (25 mg total) by mouth in the morning and 1 tablet (25 mg total) in the evening. Take with meals. 60 tablet 6    Fenofibrate 134 MG Oral Cap Take 1 capsule by mouth nightly. 90 capsule 1    Fluticasone Propionate 50 MCG/ACT Nasal Suspension SPRAY ONCE INTO EACH NOSTRIL BID PRN 15.8 mL 0

## 2024-11-17 NOTE — PLAN OF CARE
Patient is alert, oriented x4. On room air maintaining SPO2 >90%. up with SBA. On left arm precaution. PRN Dilaudid given for abdominal pain. No BM throughout shift. Fluid discontinued. NPO midnight for possible colonoscopy. Tele showing NSR.       Problem: HEMATOLOGIC - ADULT  Goal: Free from bleeding injury  Description: (Example usage: patient with low platelets)  INTERVENTIONS:  - Avoid intramuscular injections, enemas and rectal medication administration  - Ensure safe mobilization of patient  - Hold pressure on venipuncture sites to achieve adequate hemostasis  - Assess for signs and symptoms of internal bleeding  - Monitor lab trends  - Patient is to report abnormal signs of bleeding to staff  - Avoid use of toothpicks and dental floss  - Use electric shaver for shaving  - Use soft bristle tooth brush  - Limit straining and forceful nose blowing  Outcome: Progressing     Problem: PAIN - ADULT  Goal: Verbalizes/displays adequate comfort level or patient's stated pain goal  Description: INTERVENTIONS:  - Encourage pt to monitor pain and request assistance  - Assess pain using appropriate pain scale  - Administer analgesics based on type and severity of pain and evaluate response  - Implement non-pharmacological measures as appropriate and evaluate response  - Consider cultural and social influences on pain and pain management  - Manage/alleviate anxiety  - Utilize distraction and/or relaxation techniques  - Monitor for opioid side effects  - Notify MD/LIP if interventions unsuccessful or patient reports new pain  - Anticipate increased pain with activity and pre-medicate as appropriate  11/17/2024 0654 by Sukh Drummond, RN  Outcome: Not Progressing  11/17/2024 0651 by Sukh Drummond, RN  Outcome: Not Progressing

## 2024-11-17 NOTE — PLAN OF CARE
Received patient at approxiamtely 1700. Alert and oriented x4. Breathing unlabored. Vitals stable with HTN- chronic per patient. NSR on tele. Patient presents with 10/10 pain to lower left abdomen; PRN pain medications given on arrival and somewhat effective- pain currently 6/10. Admission assessment complete with body check done with 2 RNs. Patient oriented to room. Call light and personal items in reach.

## 2024-11-17 NOTE — CONSULTS
[facility] CARDIOLOGY CONSULT      Godwin Fonseca is a 44 year old male who I assessed as follows:  Chief Complaint   Patient presents with    Abdominal Pain          REASON FOR CONSULTATION:    elevated troponin            ASSESSMENT/PLAN:     IMPRESSIONS:  Abd pain and rectal bleeding  Chest pain  Elevated troponin, flat curve. Cath 2023 for elevated troponin with mild irregularities  h/o severe MR s/p MVR 1994, redo complex robotically assisted MVR ([facility] 2022)   Chronic HFpEF  HTN  Bipolar disease  ESRD on HD  DVT/PE 1/23  History TIA        PLAN:  ECG reviewed  Given chronic elevations troponin and only mild \"irregularities\" at [facility] 2023 for elevated troponin, will forgo ischemic eval if EF on echo remains the same  Hold off on IV heparin given ongoing bleeding  Await GI eval  BP meds reviewed. Adjust as needed            --------------------------------------------------------------------------------------------------------------------------------    HPI:         Godwin Fonseca is a 44 year old male with ESRD on HD, H/o DVT/PE 1/23 s/p Eliquis x 6 mo, \"NSTEMI\" 4/23 with cath showing no sig CAD, h/o severe MR s/p MVR 1994, redo complex robotically assisted MVR ([facility] 2022), chronic HFpEF, h/o TIA, HTN, bipolar disease, who presented to [facility] for abdominal pain, rectal bleeding, and chest discomfort. Intermittent bleeding but worse yesterday. Still with abd pain. Chest pain yesterday, not related to exertion, not pleuritic, no associated dyspnea. Troponin over 2000.            No current outpatient medications on file.      Past Medical History:    Anxiety state    Arrhythmia    Asthma ([facility])    Attention deficit hyperactivity disorder (ADHD)    Back problem    Bipolar 1 disorder ([facility])    CKD (chronic kidney disease) stage 3, GFR 30-59 ml/min ([facility])    Dr Meeks    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HCC)    Congestive heart disease (HCC)    COPD (chronic obstructive pulmonary disease) (HCC)     Coronary atherosclerosis    Deep vein thrombosis (HCC)    at age 17 R/T cast    Depression    Diabetes (HCC)    Dialysis patient (HCC)    Diverticulosis of large intestine    Essential hypertension    3/21 echo: severe concentric LVH with normal EF and no MR or pHTN    Extrinsic asthma, unspecified    Heart attack (HCC)    2016- angiogram- no intervention    Heart valve disease    mitral valve repair in 1994/    High blood pressure    High cholesterol    History of blood transfusion    History of mitral valve repair    Hyperlipidemia    Low back pain    tight and stiff after sweeping and mopping    LVH (left ventricular hypertrophy)    Migraines    Mixed hyperlipidemia     HDL 38 LDL 97 VLDL 57     Monoclonal gammopathy    IgG kappa     Muscle weakness    MVP (mitral valve prolapse)    Repair 1994 at [facility]; echoes as recently as 3/21 show mild or trivial MR and no stenosis    Neuropathy    Osteoarthritis    hip ,knees    Pneumonia due to organism    Pulmonary embolism (HCC)    Renal disorder    Stroke (HCC)    TIA (transient ischemic attack)    Initial history of left-sided weakness and slurred speech. (+) cocaine. MRI of the brain, CT angiogram of the head and neck, and 2D echo are all unremarkable.     TMJ (dislocation of temporomandibular joint)    Troponin level elevated    Trop 60 60 47 with TIA and no CP: Lexiscan negative with EF 51    Visual impairment     Past Surgical History:   Procedure Laterality Date    Av fistula revision, open Left     Cabg      Colonoscopy N/A 03/26/2023    Procedure: COLONOSCOPY;  Surgeon: Heath Vu MD;  Location:  ENDOSCOPY    Colonoscopy N/A 12/30/2023    Procedure: COLONOSCOPY with cold snare polypectomy and forcep polypectomy;  Surgeon: Ousmane Suarez MD;  Location:  ENDOSCOPY    Colonoscopy & polypectomy  2019    Egd  2019    Duodenitis. Biopsied. EUS for weight loss was negative    Heart surgery      Hernia surgery  08/17/2022    Dr Barnes     Laminectomy,>2 sgmt,lumbar  2018    L4-L5 Decomp Discectomy ROEM L4-L5    Mitralplasty w cp bypass      [facility]: Repair    Repair rotator cuff,chronic Left     torn and had a ruptured bicep    Sinus surgery        Spine surgery procedure unlisted      Valve repair      mitral valve     Family History   Problem Relation Age of Onset    Hypertension Father     Alcohol and Other Disorders Associated Father     Substance Abuse Father         cocaine    Dementia Father     Cancer Father         lung    Diabetes Mother     Cancer Mother         multiple myeloma    Hypertension Mother     Anxiety Maternal Aunt     Depression Maternal Aunt     Anxiety Maternal Aunt     Depression Maternal Aunt     Bipolar Disorder Maternal Aunt     Diabetes Maternal Grandmother     Hypertension Maternal Grandmother     Cancer Maternal Grandfather         stomach cancer    Diabetes Maternal Grandfather     Hypertension Maternal Grandfather     Alcohol and Other Disorders Associated Maternal Grandfather     Hypertension Paternal Grandmother     Hypertension Paternal Grandfather     Cancer Sister         uterine and ovarian    Hypertension Sister     Cancer Maternal Uncle         lung    Cancer Paternal Aunt         throat      Social History:  Social History     Socioeconomic History    Marital status:    Tobacco Use    Smoking status: Former     Current packs/day: 0.00     Average packs/day: 1 pack/day for 27.0 years (27.0 ttl pk-yrs)     Types: Cigarettes     Start date: 1995     Quit date: 2022     Years since quittin.7     Passive exposure: Never    Smokeless tobacco: Never   Vaping Use    Vaping status: Never Used   Substance and Sexual Activity    Alcohol use: No    Drug use: No     Social Drivers of Health     Financial Resource Strain: Medium Risk (2024)    Received from [facility]    Overall Financial Resource Strain (CARDIA)     Difficulty of Paying Living Expenses: Somewhat hard    Food Insecurity: No Food Insecurity (11/16/2024)    Food Insecurity     Food Insecurity: Never true   Transportation Needs: No Transportation Needs (11/16/2024)    Transportation Needs     Lack of Transportation: No   Physical Activity: Inactive (5/7/2024)    Received from [facility]    Exercise Vital Sign     Days of Exercise per Week: 0 days     Minutes of Exercise per Session: 0 min   Stress: Stress Concern Present (5/7/2024)    Received from [facility]    [employer] - Occupational Stress Questionnaire     Feeling of Stress : Rather much   Social Connections: Unknown (5/7/2024)    Received from [facility]    Social Connection and Isolation Panel [NHANES]     Frequency of Communication with Friends and Family: More than three times a week     Frequency of Social Gatherings with Friends and Family: More than three times a week     Attends Bahai Services: Never     Active Member of Clubs or Organizations: No     Attends Club or Organization Meetings: Never     Marital Status: Patient unable to answer   Housing Stability: Low Risk  (11/16/2024)    Housing Stability     Housing Instability: No          Medications:  Current Facility-Administered Medications   Medication Dose Route Frequency    albuterol (Ventolin HFA) 108 (90 Base) MCG/ACT inhaler 2 puff  2 puff Inhalation Q6H PRN    ARIPiprazole (Abilify) tab 15 mg  15 mg Oral Daily    buPROPion ER (Wellbutrin XL) 24 hr tab 150 mg  150 mg Oral Daily    carvedilol (Coreg) tab 25 mg  25 mg Oral 2x Daily(Beta Blocker)    hydrALAZINE (Apresoline) tab 50 mg  50 mg Oral Q8H MARTHA    lamoTRIgine (LaMICtal) tab 150 mg  150 mg Oral Daily    losartan (Cozaar) tab 100 mg  100 mg Oral Daily    memantine (Namenda) tab 5 mg  5 mg Oral BID    NIFEdipine ER (Procardia-XL) 24 hr tab 60 mg  60 mg Oral BID    sevelamer carbonate (Renvela) tab 800 mg  800 mg Oral TID CC    tamsulosin (Flomax) cap 0.4 mg  0.4 mg Oral Daily     umeclidinium bromide (Incruse Ellipta) 62.5 MCG/ACT inhaler 1 puff  1 puff Inhalation Daily    melatonin tab 3 mg  3 mg Oral Nightly PRN    ondansetron (Zofran) 4 MG/2ML injection 4 mg  4 mg Intravenous Q6H PRN    HYDROmorphone (Dilaudid) 1 MG/ML injection 0.2 mg  0.2 mg Intravenous Q2H PRN    Or    HYDROmorphone (Dilaudid) 1 MG/ML injection 0.4 mg  0.4 mg Intravenous Q2H PRN    Or    HYDROmorphone (Dilaudid) 1 MG/ML injection 0.8 mg  0.8 mg Intravenous Q2H PRN    pantoprazole (Protonix) 40 mg in sodium chloride 0.9% PF 10 mL IV push  40 mg Intravenous Q12H           Allergies  Allergies[1]            REVIEW OF SYSTEMS:   GENERAL HEALTH: no fevers  SKIN: denies any unusual skin lesions or rashes   EARS: no recent changes in hearing  EYE: no recent vision changes  THROAT: denies sore throat  RESPIRATORY: denies cough or shortness of breath with exertion  CARDIOVASCULAR: no syncope or palpitations  GI: abdominal pain, rectal bleeding  NEURO: denies headaches and focal neurologic symptoms  PSYCH: no recent depression  ENDOCRINE: no change in sleep pattern  HEME: no easy bruising  All other systems reviewed and negative.          EXAM:         TELE: SR          /77 (BP Location: Right arm)   Pulse 84   Temp 98.6 °F (37 °C) (Oral)   Resp 18   Ht 6' 2\" (1.88 m)   Wt 240 lb 15.4 oz (109.3 kg)   SpO2 100%   BMI 30.94 kg/m²   Temp (24hrs), Av.5 °F (36.9 °C), Min:97.9 °F (36.6 °C), Max:98.7 °F (37.1 °C)    Wt Readings from Last 3 Encounters:   24 240 lb 15.4 oz (109.3 kg)   24 240 lb (108.9 kg)   10/24/24 240 lb (108.9 kg)         Intake/Output Summary (Last 24 hours) at 2024 0550  Last data filed at 2024 0511  Gross per 24 hour   Intake 360 ml   Output 600 ml   Net -240 ml         GENERAL: well developed, well nourished, in no apparent distress  SKIN: no rashes  HEENT: atraumatic, normocephalic, throat without erythema  NECK: supple, no bruits  LUNGS: clear to auscultation  CARDIO:  RRR without murmur or S3   GI: soft, nontender  EXTREMITIES: no cyanosis, clubbing or edema  NEUROLOGY: alert  PSYCH: cooperative          LABORATORY DATA:               ECG:        ECHO:          Labs:  Recent Labs   Lab 11/16/24  1300 11/17/24  0443   * 100*   BUN 57* 54*   CREATSERUM 9.59* 9.59*   CA 8.6 8.8    139   K 4.6 4.4    104   CO2 25.0 26.0     No results for input(s): \"TROP\" in the last 168 hours.  Recent Labs   Lab 11/16/24  1300   RBC 3.07*   HGB 9.9*   HCT 29.0*   MCV 94.5   MCH 32.2   MCHC 34.1   RDW 13.9   NEPRELIM 4.59   WBC 7.0   .0     Recent Labs   Lab 11/16/24  1300 11/16/24  1518   TROPHS 2,352* 2,338*       Imaging:  CT ABDOMEN+PELVIS(CPT=74176)    Result Date: 11/16/2024  CONCLUSION:  1. Circumferential urinary bladder wall thickening, correlate for cystitis. 2. Colonic diverticulosis.   LOCATION:  [facility]   Dictated by (CST): Duy De La Rosa MD on 11/16/2024 at 2:47 PM     Finalized by (CST): Duy De La Rosa MD on 11/16/2024 at 2:50 PM             Yrn Grewal MD        [1]   Allergies  Allergen Reactions    Hydrochlorothiazide RASH and HIVES

## 2024-11-17 NOTE — PLAN OF CARE
Assumed patient care at approximately 0700. Patient A&Ox4. Room air, pt denies any pain or shortness of breath. Vital signs stable, NSR on telemetry. Cont b/b. Up ab osvaldo. Call light and personal items in reach.     Problem: HEMATOLOGIC - ADULT  Goal: Free from bleeding injury  Description: (Example usage: patient with low platelets)  INTERVENTIONS:  - Avoid intramuscular injections, enemas and rectal medication administration  - Ensure safe mobilization of patient  - Hold pressure on venipuncture sites to achieve adequate hemostasis  - Assess for signs and symptoms of internal bleeding  - Monitor lab trends  - Patient is to report abnormal signs of bleeding to staff  - Avoid use of toothpicks and dental floss  - Use electric shaver for shaving  - Use soft bristle tooth brush  - Limit straining and forceful nose blowing  Outcome: Progressing     Problem: PAIN - ADULT  Goal: Verbalizes/displays adequate comfort level or patient's stated pain goal  Description: INTERVENTIONS:  - Encourage pt to monitor pain and request assistance  - Assess pain using appropriate pain scale  - Administer analgesics based on type and severity of pain and evaluate response  - Implement non-pharmacological measures as appropriate and evaluate response  - Consider cultural and social influences on pain and pain management  - Manage/alleviate anxiety  - Utilize distraction and/or relaxation techniques  - Monitor for opioid side effects  - Notify MD/LIP if interventions unsuccessful or patient reports new pain  - Anticipate increased pain with activity and pre-medicate as appropriate  Outcome: Progressing

## 2024-11-17 NOTE — CONSULTS
[facility]   part of [facility]    Report of Consultation    Godwin Fonseca Patient Status:  Inpatient    1980 MRN [id number]   Location [facility] 8NE-A Attending Laila Ryder MD   Hosp Day # 1 PCP Adrian Small MD     Date of Admission:  2024  Date of Consult:  24    Reason for Consultation:  Rectal bleeding    History of Present Illness:  Godwin Fonseca is a a(n) 44 year old male.   Pt with recurrent rectal bleeding that has brought him to the ER multiple times in the last few months and this has been determined to be from hemorrhoids.  Has full GI w/u.  EGD/colonoscopy in August at another hospital with Usha Gallardo.  Capsule endoscopy here with us in August.  Colonoscopy with diverticulosis and hemorrhoids.  Pt with 2 episodes of rectal bleeding yesterday prior to admission - both were bright red blood.  No further bleeding since.  He has baseline anemia of hgb of 8-9 range  His hgb yesterday was 9.9 and today is 9.4.  Pt also having chest pain and troponin >2000.  Cardiology consulted.  Pt also has a pain in the left groin/LLQ that is superficial under the skin and pt says he feels a \"bump\" that is \"extremely painful\"  CT :  CONCLUSION:    1. Circumferential urinary bladder wall thickening, correlate for cystitis.   2. Colonic diverticulosis.         History:  Past Medical History:    Anxiety state    Arrhythmia    Asthma ([facility])    Attention deficit hyperactivity disorder (ADHD)    Back problem    Bipolar 1 disorder ([facility])    CKD (chronic kidney disease) stage 3, GFR 30-59 ml/min ([facility])    Dr Meeks    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[facility])    Congestive heart disease ([facility])    COPD (chronic obstructive pulmonary disease) ([facility])    Coronary atherosclerosis    Deep vein thrombosis ([facility])    at age 17 R/T cast    Depression    Diabetes ([facility])    Dialysis patient ([facility])    Diverticulosis of large intestine    Essential hypertension    3/21 echo: severe concentric LVH with normal EF and no MR  or pHTN    Extrinsic asthma, unspecified    Heart attack (HCC)    2016- angiogram- no intervention    Heart valve disease    mitral valve repair in 1994/    High blood pressure    High cholesterol    History of blood transfusion    History of mitral valve repair    Hyperlipidemia    Low back pain    tight and stiff after sweeping and mopping    LVH (left ventricular hypertrophy)    Migraines    Mixed hyperlipidemia     HDL 38 LDL 97 VLDL 57     Monoclonal gammopathy    IgG kappa     Muscle weakness    MVP (mitral valve prolapse)    Repair 1994 at [place]; echoes as recently as 3/21 show mild or trivial MR and no stenosis    Neuropathy    Osteoarthritis    hip ,knees    Pneumonia due to organism    Pulmonary embolism (HCC)    Renal disorder    Stroke (HCC)    TIA (transient ischemic attack)    Initial history of left-sided weakness and slurred speech. (+) cocaine. MRI of the brain, CT angiogram of the head and neck, and 2D echo are all unremarkable.     TMJ (dislocation of temporomandibular joint)    Troponin level elevated    Trop 60 60 47 with TIA and no CP: Lexiscan negative with EF 51    Visual impairment     Past Surgical History:   Procedure Laterality Date    Av fistula revision, open Left     Cabg      Colonoscopy N/A 03/26/2023    Procedure: COLONOSCOPY;  Surgeon: Heath Vu MD;  Location:  ENDOSCOPY    Colonoscopy N/A 12/30/2023    Procedure: COLONOSCOPY with cold snare polypectomy and forcep polypectomy;  Surgeon: Ousmane Suarez MD;  Location:  ENDOSCOPY    Colonoscopy & polypectomy  2019    Egd  2019    Duodenitis. Biopsied. EUS for weight loss was negative    Heart surgery      Hernia surgery  08/17/2022    Dr Barnes    Laminectomy,>2 sgmt,lumbar  09/06/2018    L4-L5 Decomp Discectomy ROEM L4-L5    Mitralplasty w cp bypass  1994    [place]: Repair    Repair rotator cuff,chronic Left     torn and had a ruptured bicep    Sinus surgery        Spine surgery procedure unlisted      Valve  repair  1994    mitral valve     Family History   Problem Relation Age of Onset    Hypertension Father     Alcohol and Other Disorders Associated Father     Substance Abuse Father         cocaine    Dementia Father     Cancer Father         lung    Diabetes Mother     Cancer Mother         multiple myeloma    Hypertension Mother     Anxiety Maternal Aunt     Depression Maternal Aunt     Anxiety Maternal Aunt     Depression Maternal Aunt     Bipolar Disorder Maternal Aunt     Diabetes Maternal Grandmother     Hypertension Maternal Grandmother     Cancer Maternal Grandfather         stomach cancer    Diabetes Maternal Grandfather     Hypertension Maternal Grandfather     Alcohol and Other Disorders Associated Maternal Grandfather     Hypertension Paternal Grandmother     Hypertension Paternal Grandfather     Cancer Sister         uterine and ovarian    Hypertension Sister     Cancer Maternal Uncle         lung    Cancer Paternal Aunt         throat      reports that he quit smoking about 2 years ago. His smoking use included cigarettes. He started smoking about 29 years ago. He has a 27 pack-year smoking history. He has never been exposed to tobacco smoke. He has never used smokeless tobacco. He reports that he does not drink alcohol and does not use drugs.    Allergies:  Allergies[1]    Medications:    Current Facility-Administered Medications:     albuterol (Ventolin HFA) 108 (90 Base) MCG/ACT inhaler 2 puff, 2 puff, Inhalation, Q6H PRN    ARIPiprazole (Abilify) tab 15 mg, 15 mg, Oral, Daily    buPROPion ER (Wellbutrin XL) 24 hr tab 150 mg, 150 mg, Oral, Daily    carvedilol (Coreg) tab 25 mg, 25 mg, Oral, 2x Daily(Beta Blocker)    hydrALAZINE (Apresoline) tab 50 mg, 50 mg, Oral, Q8H MARTHA    lamoTRIgine (LaMICtal) tab 150 mg, 150 mg, Oral, Daily    losartan (Cozaar) tab 100 mg, 100 mg, Oral, Daily    memantine (Namenda) tab 5 mg, 5 mg, Oral, BID    NIFEdipine ER (Procardia-XL) 24 hr tab 60 mg, 60 mg, Oral, BID     sevelamer carbonate (Renvela) tab 800 mg, 800 mg, Oral, TID CC    tamsulosin (Flomax) cap 0.4 mg, 0.4 mg, Oral, Daily    umeclidinium bromide (Incruse Ellipta) 62.5 MCG/ACT inhaler 1 puff, 1 puff, Inhalation, Daily    melatonin tab 3 mg, 3 mg, Oral, Nightly PRN    ondansetron (Zofran) 4 MG/2ML injection 4 mg, 4 mg, Intravenous, Q6H PRN    HYDROmorphone (Dilaudid) 1 MG/ML injection 0.2 mg, 0.2 mg, Intravenous, Q2H PRN **OR** HYDROmorphone (Dilaudid) 1 MG/ML injection 0.4 mg, 0.4 mg, Intravenous, Q2H PRN **OR** HYDROmorphone (Dilaudid) 1 MG/ML injection 0.8 mg, 0.8 mg, Intravenous, Q2H PRN    pantoprazole (Protonix) 40 mg in sodium chloride 0.9% PF 10 mL IV push, 40 mg, Intravenous, Q12H    Review of Systems:  GENERAL: feels well otherwise  SKIN: neg  EYES: neg  HEENT: neg  LUNGS: neg  CARDIOVASCULAR: neg  GI: as above  NEURO: neg  PSYCHE:  neg    Physical Exam:    Blood pressure 138/77, pulse 84, temperature 98.6 °F (37 °C), temperature source Oral, resp. rate 18, height 74\", weight 240 lb 15.4 oz (109.3 kg), SpO2 100%.  GENERAL: well developed, well nourished, in no apparent distress  SKIN: no rashes, no jaundice  HEENT: atraumatic, normocephalic,  EYES:  no icterus  CHEST: no chest tenderness  LUNGS: clear to auscultation  CARDIO: RRR without murmur  GI: good BS's and no masses, HSM .  There is a 1 cm painful lump in the LLQ/left groin that pt points to.  This feels like a lipoma.    EXTREMITIES: no edema  NEURO: Oriented times three    Laboratory Data:  Lab Results   Component Value Date    WBC 6.4 11/17/2024    HGB 9.4 11/17/2024    HCT 28.4 11/17/2024    .0 11/17/2024    CREATSERUM 9.59 11/17/2024    BUN 54 11/17/2024     11/17/2024    K 4.4 11/17/2024     11/17/2024    CO2 26.0 11/17/2024     11/17/2024    CA 8.8 11/17/2024    ALB 3.9 11/17/2024    ALKPHO 91 11/17/2024    BILT 0.4 11/17/2024    TP 6.7 11/17/2024    AST 20 11/17/2024    ALT 17 11/17/2024    LIP 56 11/16/2024    MG 2.0  11/17/2024    PHOS 7.5 11/17/2024    TROPHS 2,338 11/16/2024       Imaging:  CONCLUSION:    1. Circumferential urinary bladder wall thickening, correlate for cystitis.   2. Colonic diverticulosis.         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 ([facility])    Chronic non-seasonal allergic rhinitis    Chronic sinusitis, unspecified location    ADHD (attention deficit hyperactivity disorder), inattentive type    Bipolar depression ([facility])    Essential hypertension    Mild persistent asthma without complication ([facility])    CKD (chronic kidney disease) stage 3, GFR 30-59 ml/min ([facility])    Self-inflicted injury    Bipolar disorder in partial remission, most recent episode unspecified type ([facility])    Family history of prostate cancer    Fatigue, unspecified type    Alkaline phosphatase elevation    Hyperlipidemia, mixed    Low serum HDL    Spinal stenosis of lumbar region with neurogenic claudication    Prolapsed lumbar disc    Status post lumbar surgery    Cauda equina syndrome ([facility])    Lumbar disc prolapse with compression radiculopathy    Syncope and collapse    Hypokalemia    Orthostatic hypotension    Hypertension, unspecified type    Weight loss    Chest pain, unspecified type    History of mitral valve stenosis    Acute pericarditis, unspecified type ([facility])    Cervical radiculopathy    Elevated blood protein    IgG monoclonal protein disorder    MGUS (monoclonal gammopathy of unknown significance)    Hypertensive urgency    Bipolar 2 disorder ([facility])    Employment problem    Stress    Anxiety disorder, unspecified type    Weakness of left lower extremity    Rectal bleeding    Paresthesia of foot, bilateral    Obesity (BMI 30-39.9)    TIA (transient ischemic attack)    TMJ dysfunction    Inverted papilloma of nasal cavity    Type 2 diabetes mellitus with stage 3b chronic kidney disease, without long-term current use of insulin ([facility])    Acute kidney injury  (HCC)    Metabolic acidosis    Hyperglycemia    Chronic kidney disease, unspecified CKD stage    Abdominal distension    SOB (shortness of breath)    Hypertensive crisis    Acute on chronic congestive heart failure, unspecified heart failure type (HCC)    Acute congestive heart failure (HCC)    Acute congestive heart failure, unspecified heart failure type (HCC)    Acute on chronic diastolic congestive heart failure (HCC)    Stage 4 chronic kidney disease (HCC)    ROBERT (acute kidney injury) (HCC)    Abdominal pain, left lower quadrant    Hypertensive emergency    Acute chest pain    Acute on chronic renal insufficiency    Chronic abdominal pain    Nonintractable headache, unspecified chronicity pattern, unspecified headache type    Chronic right shoulder pain    HCAP (healthcare-associated pneumonia)    Acute intractable headache, unspecified headache type    ESRD (end stage renal disease) on dialysis (HCC)    Diarrhea, unspecified type    Primary hypertension    Dyspnea, unspecified type    Abdominal pain, acute    ESRD on dialysis (HCC)    Chronic renal failure, unspecified CKD stage    Fluid overload    ESRD needing dialysis (HCC)    COVID-19 virus infection    Hypotension, unspecified hypotension type    Anemia    Acute renal failure (ARF) (HCC)    Neck pain    Acute nonintractable headache, unspecified headache type    GI bleed    Gastrointestinal hemorrhage, unspecified gastrointestinal hemorrhage type    Chronic kidney disease with end stage renal failure on dialysis (HCC)    Lower abdominal pain    ESRD (end stage renal disease) (HCC)    Resistant hypertension    Community acquired pneumonia of right lower lobe of lung    Acute renal failure with acute renal cortical necrosis superimposed on stage 4 chronic kidney disease (HCC)    Acute renal failure, unspecified acute renal failure type (HCC)    Hypervolemia, unspecified hypervolemia type    End stage renal disease on dialysis (HCC)    Acute respiratory  failure with hypoxia (HCC)    Stage 5 chronic kidney disease on chronic dialysis (HCC)    Anemia, unspecified type    Hyperkalemia    Hemodialysis catheter infection, initial encounter (HCC)    Type 2 diabetes mellitus with chronic kidney disease on chronic dialysis, without long-term current use of insulin (HCC)    Coronary artery disease involving native coronary artery of native heart without angina pectoris    Pneumonia of right lower lobe due to infectious organism    Expressive aphasia    Uncontrolled hypertension    Bipolar disorder with moderate depression (HCC)    BRBPR (bright red blood per rectum)       This is a 43 y/o with numerous medical issues.   Has been evaluated with full GI w/u for rectal bleeding that pt has been having for last few months.  Deemed to be hemorrhoids.  EGD/colonoscopy with pt's outpt  GI doctor Usha Gallardo in [place] in August and capsule endoscopy with us here in August all unremarkable.  Pt's bleeding pattern - bright red blood per rectum just 1 or 2 x then quick resolution and no drop in hgb.  This, I agree with his other GI doctors is suggestive of hemorrhoid bleeding.  Previously last month was recommended to use anusol suppositories.  Pt did not use these.  I agree with this recommended and will order this.    The LLQ pain is from what feels like a small lipoma on the LLQ/left groin.  Will have hospitalist address this.    Also CT shows cystitis - also will have hospitalist address.    High troponins - cardiology to see pt.    No further GI plans at this time.        Joe Salinas MD  11/17/2024  10:04 AM       [1]   Allergies  Allergen Reactions    Hydrochlorothiazide RASH and HIVES

## 2024-11-18 LAB
ALBUMIN SERPL-MCNC: 4.1 G/DL (ref 3.2–4.8)
ANION GAP SERPL CALC-SCNC: 11 MMOL/L (ref 0–18)
BUN BLD-MCNC: 63 MG/DL (ref 9–23)
CALCIUM BLD-MCNC: 9 MG/DL (ref 8.7–10.4)
CHLORIDE SERPL-SCNC: 104 MMOL/L (ref 98–112)
CO2 SERPL-SCNC: 24 MMOL/L (ref 21–32)
CREAT BLD-MCNC: 10.28 MG/DL
EGFRCR SERPLBLD CKD-EPI 2021: 6 ML/MIN/1.73M2 (ref 60–?)
ERYTHROCYTE [DISTWIDTH] IN BLOOD BY AUTOMATED COUNT: 13.5 %
GLUCOSE BLD-MCNC: 122 MG/DL (ref 70–99)
HCT VFR BLD AUTO: 29.1 %
HGB BLD-MCNC: 9.9 G/DL
MCH RBC QN AUTO: 31.5 PG (ref 26–34)
MCHC RBC AUTO-ENTMCNC: 34 G/DL (ref 31–37)
MCV RBC AUTO: 92.7 FL
OSMOLALITY SERPL CALC.SUM OF ELEC: 307 MOSM/KG (ref 275–295)
PHOSPHATE SERPL-MCNC: 7.4 MG/DL (ref 2.4–5.1)
PLATELET # BLD AUTO: 246 10(3)UL (ref 150–450)
POTASSIUM SERPL-SCNC: 4.5 MMOL/L (ref 3.5–5.1)
RBC # BLD AUTO: 3.14 X10(6)UL
SODIUM SERPL-SCNC: 139 MMOL/L (ref 136–145)
WBC # BLD AUTO: 7.1 X10(3) UL (ref 4–11)

## 2024-11-18 PROCEDURE — 85027 COMPLETE CBC AUTOMATED: CPT | Performed by: HOSPITALIST

## 2024-11-18 PROCEDURE — 80069 RENAL FUNCTION PANEL: CPT | Performed by: HOSPITALIST

## 2024-11-18 PROCEDURE — 90935 HEMODIALYSIS ONE EVALUATION: CPT | Performed by: STUDENT IN AN ORGANIZED HEALTH CARE EDUCATION/TRAINING PROGRAM

## 2024-11-18 PROCEDURE — 96376 TX/PRO/DX INJ SAME DRUG ADON: CPT

## 2024-11-18 RX ORDER — ALBUMIN (HUMAN) 12.5 G/50ML
25 SOLUTION INTRAVENOUS
Status: DISCONTINUED | OUTPATIENT
Start: 2024-11-18 | End: 2024-11-19

## 2024-11-18 RX ORDER — HEPARIN SODIUM 1000 [USP'U]/ML
1.5 INJECTION, SOLUTION INTRAVENOUS; SUBCUTANEOUS
Status: DISCONTINUED | OUTPATIENT
Start: 2024-11-18 | End: 2024-11-18

## 2024-11-18 NOTE — SPIRITUAL CARE NOTE
Spiritual Care Visit Note    Patient Name: Godwin Fonseca Date of Spiritual Care Visit: 24   : 1980 Primary Dx: Acute chest pain       Referred By:      Spiritual Care Taxonomy:    Intended Effects: Build relationship of care and support    Methods: Offer support    Interventions: Acknowledge current situation;Active listening;Explain  role;Silent prayer    Visit Type/Summary:     - Spiritual Care: Consulted with RN prior to visit. Patient declined a  visit at this time.  received referral from a nurse. Patient thanked  for support advising that he is sleeping.  advise to notify nurse if he would like a visit at another time.  remains available as needed for follow up.    Spiritual Care support can be requested via an Epic consult. For urgent/immediate needs, please contact the On Call  at: Edward: ext [phone]    Gisele. Jessica Herbert Mdiv.

## 2024-11-18 NOTE — PAYOR COMM NOTE
--------------  STATUS CHANGED TO OBSERVATION ON 11/18 PER ORDER BELOW       Order Requisition    Place in observation Once (Order #[id number]) on 11/18/24       ADMISSION REVIEW     Payor: [employer]  Subscriber #:  [id number]  Authorization Number: [id number]    Admit date: 11/16/24  Admit time:  5:16 PM       REVIEW DOCUMENTATION:     ED Provider Notes        ED Provider Notes signed by Cata Mei APRN at 11/16/2024  3:37 PM       Author: Cata Mei APRN Service: -- Author Type: APN    Filed: 11/16/2024  3:37 PM Date of Service: 11/16/2024  1:13 PM Status: Signed    : Cata Mei APRN (GISSELLE) Cosigner: Maria Elena Mendoza DO at 11/16/2024  9:57 PM           Patient Seen in: [facility] [place]      History     Chief Complaint   Patient presents with    Abdominal Pain     Stated Complaint: abd pain - rectal bleeding - chest pain    Subjective:   HPI    Patient is a 44-year-old male with asthma, bipolar, history of GI bleed, arrhythmia, renal disease (hemodialysis yesterday), DM, HTN, COPD, PE, and history of MI 2016 here with multiple complaints including lower abdominal pain, GI/rectal bleeding, chest pain and SOB.      Abdominal pain started yesterday after HD.  Bright red blood in one stool and passed large clot. Patient states he has continued bleeding from rectum. Also experiencing lower back pain.     Lower abdominal pain restarted this moring along with chest pain and SOB.  Patient states pain is reproducible with deep inspiration.  States he feels a constant \"pressure.\"        Objective:     Past Medical History:    Anxiety state    Arrhythmia    Asthma (HCC)    Attention deficit hyperactivity disorder (ADHD)    Back problem    Bipolar 1 disorder (HCC)    CKD (chronic kidney disease) stage 3, GFR 30-59 ml/min ([facility])    Dr Meeks    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HCC)    Congestive heart disease (HCC)    COPD (chronic obstructive pulmonary disease) ([facility])    Coronary  atherosclerosis    Deep vein thrombosis (HCC)    at age 17 R/T cast    Depression    Diabetes (HCC)    Dialysis patient (HCC)    Diverticulosis of large intestine    Essential hypertension    3/21 echo: severe concentric LVH with normal EF and no MR or pHTN    Extrinsic asthma, unspecified    Heart attack (HCC)    2016- angiogram- no intervention    Heart valve disease    mitral valve repair in 1994/    High blood pressure    High cholesterol    History of blood transfusion    History of mitral valve repair    Hyperlipidemia    Low back pain    tight and stiff after sweeping and mopping    LVH (left ventricular hypertrophy)    Migraines    Mixed hyperlipidemia     HDL 38 LDL 97 VLDL 57     Monoclonal gammopathy    IgG kappa     Muscle weakness    MVP (mitral valve prolapse)    Repair 1994 at [place]; echoes as recently as 3/21 show mild or trivial MR and no stenosis    Neuropathy    Osteoarthritis    hip ,knees    Pneumonia due to organism    Pulmonary embolism (HCC)    Renal disorder    Stroke (HCC)    TIA (transient ischemic attack)    Initial history of left-sided weakness and slurred speech. (+) cocaine. MRI of the brain, CT angiogram of the head and neck, and 2D echo are all unremarkable.     TMJ (dislocation of temporomandibular joint)    Troponin level elevated    Trop 60 60 47 with TIA and no CP: Lexiscan negative with EF 51    Visual impairment              No pertinent past surgical history.              No pertinent social history.                Physical Exam     ED Triage Vitals   BP 11/16/24 1236 (!) 167/109   Pulse 11/16/24 1236 93   Resp 11/16/24 1236 22   Temp 11/16/24 1307 98.7 °F (37.1 °C)   Temp src 11/16/24 1307 Oral   SpO2 11/16/24 1236 98 %   O2 Device 11/16/24 1236 None (Room air)       Current Vitals:   Vital Signs  BP: (!) 172/116  Pulse: 98  Resp: 20  Temp: 98.7 °F (37.1 °C)  Temp src: Oral    Oxygen Therapy  SpO2: 96 %  O2 Device: None (Room air)        Physical Exam  Vitals  and nursing note reviewed.   Constitutional:       General: He is in acute distress.      Appearance: He is not ill-appearing.   Eyes:      Extraocular Movements: Extraocular movements intact.      Pupils: Pupils are equal, round, and reactive to light.   Cardiovascular:      Rate and Rhythm: Normal rate and regular rhythm.      Heart sounds: Normal heart sounds.   Pulmonary:      Effort: Pulmonary effort is normal.      Breath sounds: Normal breath sounds.   Abdominal:      General: Bowel sounds are normal.      Palpations: Abdomen is soft.      Tenderness: There is abdominal tenderness in the suprapubic area and left lower quadrant. There is guarding. There is no right CVA tenderness, left CVA tenderness or rebound.   Genitourinary:     Rectum: Guaiac result positive. Internal hemorrhoid present. No anal fissure. Normal anal tone.      Comments: Visible blood around rectum          ED Course     Labs Reviewed   COMP METABOLIC PANEL (14) - Abnormal; Notable for the following components:       Result Value    Glucose 141 (*)     BUN 57 (*)     Creatinine 9.59 (*)     Calculated Osmolality 300 (*)     eGFR-Cr 6 (*)     A/G Ratio 0.9 (*)     All other components within normal limits   LIPASE - Abnormal; Notable for the following components:    Lipase 56 (*)     All other components within normal limits   CBC WITH DIFFERENTIAL WITH PLATELET - Abnormal; Notable for the following components:    RBC 3.07 (*)     HGB 9.9 (*)     HCT 29.0 (*)     All other components within normal limits   TROPONIN I HIGH SENSITIVITY - Abnormal; Notable for the following components:    Troponin I (High Sensitivity) 2,352 (*)     All other components within normal limits   LIPID PANEL   TROPONIN I HIGH SENSITIVITY     EKG    Rate, intervals and axes as noted on EKG Report.  Rate: 93  Rhythm: Sinus Rhythm  Reading: sinus, no obvious ischemia            CT ABDOMEN+PELVIS(CPT=74176)    Result Date: 11/16/2024  CONCLUSION:  1. Circumferential  urinary bladder wall thickening, correlate for cystitis. 2. Colonic diverticulosis.   LOCATION:  [facility]   Dictated by (CST): Duy De La Rosa MD on 11/16/2024 at 2:47 PM     Finalized by (CST): Duy De La Rosa MD on 11/16/2024 at 2:50 PM              [facility]        Admission disposition: 11/16/2024  3:21 PM           Medical Decision Making  Differentials include but are not limited to GI bleed, diverticulitis, perforation, retroperitoneal bleed, pneumonia, MI and PE.  Patient's troponins normally elevated, more elevated here today at 2352.  Mild anemia-hemoglobin 9.9, reduced from previous 11.0 on 11/9/2024.  CT of the abdomen and pelvis unremarkable.  Patient to be admitted for further observation, stabilization and management.    Patient presentation and plan of care reviewed and formulated with Dr. Mendoza, emergency department physician            Disposition and Plan     Clinical Impression:  1. Acute chest pain    2. Gastrointestinal hemorrhage, unspecified gastrointestinal hemorrhage type         Disposition:  Admit  11/16/2024  3:21 pm    Follow-up:  No follow-up provider specified.        Medications Prescribed:  Current Discharge Medication List              Supplementary Documentation:         Hospital Problems       Present on Admission  Date Reviewed: 11/9/2024            ICD-10-CM Noted POA    * (Principal) Acute chest pain R07.9 4/5/2023 Unknown                                                          Signed by Maria Elena Mendoza DO on 11/16/2024  9:57 PM       ED Provider Notes signed by Maria Elena Mendoza DO at 11/16/2024  4:26 PM       Author: Maria Elena Mendoza DO Service: -- Author Type: Physician    Filed: 11/16/2024  4:26 PM Date of Service: 11/16/2024  1:37 PM Status: Addendum    : Maria Elena Mendoza DO (Physician)    Related Notes: Original Note by Maria Elena Mendoza DO (Physician) filed at 11/16/2024  4:23 PM         This is a 44-year-old male past medical history of end-stage renal disease on  dialysis, last dialysis yesterday, bipolar, asthma, hyperlipidemia, hypertension, MI, migraines, TIA, status post DVT/PE 1/23 status post 6 months Eliquis, NSTEMI 4/23 with cath showing no significant coronary artery disease.,  Severe MR status post mitral valve replacement 1994, redo robotically 2022, heart failure recent hospital admission 11/6 through 11/9/2024 hospitalized for aphasia, cognitive dysfunction no evidence stroke was found presents with multiple complaints.  Patient states after dialysis yesterday went to the grocery store and while in the grocery reviewed severe lower abdominal pain that dropped into his knees.  It happened twice so he went home.  He states he laid on the couch most the night when he got up this morning and the pain again returned at about 9 3 this morning had a voluminous episode of bright red blood per rectum x 1 with 3 small clots that were about the size of a quarter.  He continues to have burning lower abdominal pain in addition he is at chest pain has been coming intermittently throughout the day which he describes as sharp and radiating into his chest in the midline and lasting only a few seconds.  He denies shortness of breath.  No nausea or vomiting.  No hematemesis.  He presents here for further evaluation.        GENERAL: Awake, alert oriented x3, nontoxic appearing.  Appears uncomfortable.  SKIN: Normal, warm, and dry.  HEENT:  Pupils equally round and reactive to light. Conjuctiva clear.  Oropharynx is clear and moist.   Lungs: Clear to auscultation bilaterally with no rales, no retractions, and no wheezing.  HEART:  Regular rate and rhythm. S1 and S2. No murmurs, no rubs or gallops.   ABDOMEN: Soft, tender in the lower abdomen and nondistended. Normoactive bowel sounds. No rebound. No guarding.   Rectal: Bright red blood on glove  EXTREMITIES: Warm with brisk capillary refill.     Basic labs were obtained.  CBC: White blood cell count 7.0.  Hemoglobin 9.9.  Previous  hemoglobin 11.0 on 2024.  Platelet 263.  CMP: BUN 57.  Creatinine 0.59.  Glucose 141.  Bicarb 25.  Lipase 56.    Protonix was given.  Baby aspirin given.    CT scan abdomen pelvis without contrast showed some colonic diverticulosis.    Troponin elevated 2352  Repeat troponin 2338      Patient's troponins are chronically low but not this high.  Previous troponin  was 474    2D echo 3/10/2024 shows EF of 55 to 60%.    EKG    Rate, intervals and axes as noted on EKG Report.  Rate: 93  Rhythm: Sinus Rhythm  Reading: No acute ischemic changes.  Similar in appearance to EKG from 2024    Patient's blood pressure was elevated and was given IV labetalol 20 mg IV push.    Patient is aware of plan for admission.  He is being admitted for elevated troponin rule out acute coronary syndrome.  No heparin at this time due to rectal bleeding.  CT scan showed no obvious etiology for rectal bleeding.  Possible diverticular bleed.  He is to remain n.p.o. most likely undergo colonoscopy tomorrow.  Plan for admission discussed with patient.  Expresses understanding.  Patient was transferred via ambulance for direct admission to [facility].    Case discussed with [facility] cardiology Dr. Kathleen.  Will hold heparin for now.  They will evaluate him tomorrow.      Case discussed with [facility] GI Dr. Salinas agrees with current management.    Case discussed with [facility] hospitalist Dr. Joseph      EKG    Rate, intervals and axes as noted on EKG Report.  Rate: 88  Rhythm: Sinus Rhythm  Reading: No acute changes from EKG #1              Consult Orders   Consult to Cardiology [[id number]] ordered by Phong Freedman DO at 24 0835          Expand All Collapse All    [facility] Cardiology  Consultation Note              Godwin Fonseca Patient Status:  Inpatient    1980 MRN [id number]   Location [facility] 7NE-A Attending Phong Freedman DO   Hosp Day # 1 PCP Prieto Novak MD      Reason for  consult: Troponin     Primary cardiologist: Devan Rankin MD      History of Present Illness:  Godwin Fonseca is a 44 year old male with ESRD on HD, H/o DVT/PE 1/23 s/p Eliquis x 6 mo, \"NSTEMI\" 4/23 with cath showing no sig CAD, h/o severe MR s/p MVR 1994, redo complex robotically assisted MVR ([facility] 2022), chronic HFpEF, h/o TIA, HTN, bipolar disease, who presented to [facility] on 9/15/2024 with SOB. Missed HD last Friday due to abdominal pain, vomiting and diarrhea. Since then has been SOB. He is at \"dry wt\" ~ 245-250 lbs. Denies LE edema. No CP. No fevers. BP has been high 180-190s. Last week had lower abd pain and dx'd with prostatitis, was taking levaquin. We are consulted for troponin ~ 500s. Again denies CP. Had cath 4/23 for + troponin that showed no sig CAD.   Impression:  44 year old male presenting with SOB after missing HD session (last 5 days PTA)  Mild troponin elevation - chronic, does not reflect ACS  \"NSTEMI\" 4/23 with cath showing no sig CAD  H/o severe MR s/p MVR 1994, redo complex robotically assisted MVR ([facility] 2022)  Chronic HFpEF  ESRD on HD  H/o DVT/PE 1/23 s/p Eliquis x 6 mo  Primary hyperaldosteronism  HTN - not controlled  H/o TIA  Bipolar disease  H/o cocaine use  H/o RLL PNA (8/8/24) treated with Abx, prednisone  Melena - C-scope, EGD no source, planned for capsule endoscopy  Chronic anemia     Recommendations:  HD per renal for volume and BP management  Continue usual home antihypertensive regimen for now with IV prn   Not on ASA due to recent GIB     Thank you for allowing our practice to participate in the care of your patient. Please do not hesitate to contact me if you have any questions.     Micheal Campos MD  Interventional Cardiology  [facility]  Office: [phone]               Reviewed  capsule endoscopy 10/15/2024 as below    IMPRESSION:  There is no evidence of active gastrointestinal bleeding on this examination.     RECOMMENDATIONS:  At the present time,  would recommend continuing to monitor the patient's hemoglobin.  Follow up with referring physician                Electronically signed by Chung Alfaro MD at 10/17/2024 10:11 AM               ASSESSMENT AND PLAN:   Acute on chronic anemia  Chronic rectal bleeding  End-stage renal disease on hemodialysis     Recommendations  - No active bleeding at this time, recommend follow up with the established gastroenterologist for outpatient capsule endoscopy of the small bowel ( Dr [facility])  - Recommend outpatient follow-up with surgery for hemorrhoidectomy   - regular diet   - already had EGD and colonoscopy with       Dr Gallardo. No plans for repeat EGD and colonoscopy      The patient indicates understanding of these issues and agrees to the plan.  Elliott Shane DO  10/7/2024  12:06 PM               Electronically signed by Elliott Shane DO at 10/7/2024 12:09 PM    Signed by Maria Elena Mendoza DO on 11/16/2024  4:26 PM         MEDICATIONS ADMINISTERED IN LAST 1 DAY:  buPROPion ER (Wellbutrin XL) 24 hr tab 150 mg       Date Action Dose Route User    11/18/2024 1004 Given 150 mg Oral Serina Youngblood RN          carvedilol (Coreg) tab 25 mg       Date Action Dose Route User    11/18/2024 0527 Given 25 mg Oral Olivia Mann RN    11/17/2024 1616 Given 25 mg Oral Nichelle Zuñiga RN          hydrALAZINE (Apresoline) tab 50 mg       Date Action Dose Route User    11/18/2024 0527 Given 50 mg Oral Olivia Mann RN    11/17/2024 2328 Given 50 mg Oral Elisabeth Cook RN    11/17/2024 1617 Given 50 mg Oral Nichelle Zuñiga RN          hydrocortisone (Anusol-HC) 25 MG rectal suppository 25 mg       Date Action Dose Route User    11/18/2024 1002 Given 25 mg Rectal Serina Youngblood RN    11/17/2024 1617 Given 25 mg Rectal Nichelle Zuñiga RN          HYDROmorphone (Dilaudid) 1 MG/ML injection 0.2 mg       Date Action Dose Route User    11/17/2024 1941 Given 0.2 mg Intravenous Joyce  Elisabeth GONZÁLES RN          HYDROmorphone (Dilaudid) 1 MG/ML injection 0.4 mg       Date Action Dose Route User    11/18/2024 0758 Given 0.4 mg Intravenous Che Jerome RN    11/18/2024 0525 Given 0.4 mg Intravenous TufuorOlivia RN    11/18/2024 0020 Given 0.4 mg Intravenous Olivia Mann RN    11/17/2024 1617 Given 0.4 mg Intravenous Nichelle Zuñiga RN          HYDROmorphone (Dilaudid) 1 MG/ML injection 0.8 mg       Date Action Dose Route User    11/18/2024 1002 Given 0.8 mg Intravenous Serina Youngblood RN          lamoTRIgine (LaMICtal) tab 150 mg       Date Action Dose Route User    11/18/2024 1003 Given 150 mg Oral Serina Youngblood RN          lidocaine-menthol 4-1 % patch 1 patch       Date Action Dose Route User    11/18/2024 1002 Patch Applied 1 patch Transdermal (Left Upper Abdomen) Serina Youngblood RN    11/17/2024 1617 Patch Applied 1 patch Transdermal (Left Lower Abdomen) Nichelle Zuñiga RN          memantine (Namenda) tab 5 mg       Date Action Dose Route User    11/18/2024 1003 Given 5 mg Oral Serina Youngblood RN    11/17/2024 2000 Given 5 mg Oral Elisabeth Cook RN          NIFEdipine ER (Procardia-XL) 24 hr tab 60 mg       Date Action Dose Route User    11/17/2024 2000 Given 60 mg Oral Elisabeth Cook RN          sevelamer carbonate (Renvela) tab 800 mg       Date Action Dose Route User    11/18/2024 1003 Given 800 mg Oral Serina Youngblood RN    11/17/2024 1617 Given 800 mg Oral Nichelle Zuñiga RN          tamsulosin (Flomax) cap 0.4 mg       Date Action Dose Route User    11/18/2024 1004 Given 0.4 mg Oral Serina Youngblood RN          umeclidinium bromide (Incruse Ellipta) 62.5 MCG/ACT inhaler 1 puff       Date Action Dose Route User    11/18/2024 1004 Given 1 puff Inhalation Serina Youngblood RN          ARIPiprazole (Abilify) tab 15 mg       Date Action Dose Route User    11/18/2024 1003 Given 15 mg Oral Serina Youngblood, RN            Vitals (last day)       Date/Time Temp  Pulse Resp BP SpO2 Weight O2 Device O2 Flow Rate (L/min) [facility]    11/18/24 1225 97.5 °F (36.4 °C) 86 18 136/82 100 % -- None (Room air) --     11/18/24 0841 97.9 °F (36.6 °C) 80 18 129/85 97 % -- None (Room air) --     11/18/24 0525 -- -- -- -- -- 240 lb 11.9 oz (109.2 kg) -- --     11/18/24 0525 97.8 °F (36.6 °C) 98 18 128/83 100 % -- None (Room air) -- LT    11/17/24 2304 -- 84 18 119/73 100 % -- None (Room air) --     11/17/24 1930 98.1 °F (36.7 °C) 88 20 124/79 100 % -- None (Room air) --     11/17/24 1608 97.6 °F (36.4 °C) 94 18 149/92 100 % -- None (Room air) --     11/17/24 1229 97.9 °F (36.6 °C) 90 18 131/92 -- -- None (Room air) --           CIWA Scores (since admission)       None

## 2024-11-18 NOTE — CONSULTS
[facility] - Nephrology  Inpatient Consultation    Godwin Fonseca Patient Status:  Observation    1980 MRN [id number]   Formerly [facility] 8NE-A Attending Laila Ryder MD   Hosp Day # 2 PCP Adrian Small MD     Reason for consult: ESRD on HD  Date of consultation: 2024     HISTORY OF PRESENT ILLNESS:     Godwin Fonseca is a 44 year old male with a medical history notable for ESRD on HD MWF who presents with rectal bleeding. Similar concerns in the past. Has not missed any dialysis sessions as of late. Currently using his fistula. Does admit to some bloating at this time with some abdominal discomfort. Nephrology consulted for further workup and management of dialysis.     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[facility])    Congestive heart disease ([facility])    COPD (chronic obstructive pulmonary disease) ([facility])    Coronary atherosclerosis    Deep vein thrombosis ([facility])    at age 17 R/T cast    Depression    Diabetes ([facility])    Dialysis patient (HCC)    Diverticulosis of large intestine    Essential hypertension    3/21 echo: severe concentric LVH with normal EF and no MR or pHTN    Extrinsic asthma, unspecified    Heart attack (HCC)    - angiogram- no intervention    Heart valve disease    mitral valve repair in /    High blood pressure    High cholesterol    History of blood transfusion    History of mitral valve repair    Hyperlipidemia    Low back pain    tight and stiff after sweeping and mopping    LVH (left ventricular hypertrophy)    Migraines    Mixed hyperlipidemia     HDL 38 LDL 97 VLDL 57     Monoclonal gammopathy    IgG kappa     Muscle weakness    MVP (mitral valve  prolapse)    Repair 1994 at [facility]; echoes as recently as 3/21 show mild or trivial MR and no stenosis    Neuropathy    Osteoarthritis    hip ,knees    Pneumonia due to organism    Pulmonary embolism (HCC)    Renal disorder    Stroke (HCC)    TIA (transient ischemic attack)    Initial history of left-sided weakness and slurred speech. (+) cocaine. MRI of the brain, CT angiogram of the head and neck, and 2D echo are all unremarkable.     TMJ (dislocation of temporomandibular joint)    Troponin level elevated    Trop 60 60 47 with TIA and no CP: Lexiscan negative with EF 51    Visual impairment     Past Surgical History:   Procedure Laterality Date    Av fistula revision, open Left     Cabg      Colonoscopy N/A 03/26/2023    Procedure: COLONOSCOPY;  Surgeon: Heath Vu MD;  Location:  ENDOSCOPY    Colonoscopy N/A 12/30/2023    Procedure: COLONOSCOPY with cold snare polypectomy and forcep polypectomy;  Surgeon: Ousmane Suarez MD;  Location:  ENDOSCOPY    Colonoscopy & polypectomy  2019    Egd  2019    Duodenitis. Biopsied. EUS for weight loss was negative    Heart surgery      Hernia surgery  08/17/2022    Dr Barnes    Laminectomy,>2 sgmt,lumbar  09/06/2018    L4-L5 Decomp Discectomy ROEM L4-L5    Mitralplasty w cp bypass  1994    [facility]: Repair    Repair rotator cuff,chronic Left     torn and had a ruptured bicep    Sinus surgery        Spine surgery procedure unlisted      Valve repair  1994    mitral valve     Family History   Problem Relation Age of Onset    Hypertension Father     Alcohol and Other Disorders Associated Father     Substance Abuse Father         cocaine    Dementia Father     Cancer Father         lung    Diabetes Mother     Cancer Mother         multiple myeloma    Hypertension Mother     Anxiety Maternal Aunt     Depression Maternal Aunt     Anxiety Maternal Aunt     Depression Maternal Aunt     Bipolar Disorder Maternal Aunt     Diabetes Maternal Grandmother     Hypertension Maternal  Grandmother     Cancer Maternal Grandfather         stomach cancer    Diabetes Maternal Grandfather     Hypertension Maternal Grandfather     Alcohol and Other Disorders Associated Maternal Grandfather     Hypertension Paternal Grandmother     Hypertension Paternal Grandfather     Cancer Sister         uterine and ovarian    Hypertension Sister     Cancer Maternal Uncle         lung    Cancer Paternal Aunt         throat      reports that he quit smoking about 2 years ago. His smoking use included cigarettes. He started smoking about 29 years ago. He has a 27 pack-year smoking history. He has never been exposed to tobacco smoke. He has never used smokeless tobacco. He reports that he does not drink alcohol and does not use drugs.  Allergies[1]    MEDICATIONS:       Current Facility-Administered Medications:     sodium chloride 0.9 % IV bolus 100 mL, 100 mL, Intravenous, Q30 Min PRN **AND** albumin human (Albumin) 25% injection 25 g, 25 g, Intravenous, PRN Dialysis    hydrocortisone (Anusol-HC) 25 MG rectal suppository 25 mg, 25 mg, Rectal, BID    lidocaine-menthol 4-1 % patch 1 patch, 1 patch, Transdermal, Daily    albuterol (Ventolin HFA) 108 (90 Base) MCG/ACT inhaler 2 puff, 2 puff, Inhalation, Q6H PRN    ARIPiprazole (Abilify) tab 15 mg, 15 mg, Oral, Daily    buPROPion ER (Wellbutrin XL) 24 hr tab 150 mg, 150 mg, Oral, Daily    carvedilol (Coreg) tab 25 mg, 25 mg, Oral, 2x Daily(Beta Blocker)    hydrALAZINE (Apresoline) tab 50 mg, 50 mg, Oral, Q8H MARTHA    lamoTRIgine (LaMICtal) tab 150 mg, 150 mg, Oral, Daily    losartan (Cozaar) tab 100 mg, 100 mg, Oral, Daily    memantine (Namenda) tab 5 mg, 5 mg, Oral, BID    NIFEdipine ER (Procardia-XL) 24 hr tab 60 mg, 60 mg, Oral, BID    sevelamer carbonate (Renvela) tab 800 mg, 800 mg, Oral, TID CC    tamsulosin (Flomax) cap 0.4 mg, 0.4 mg, Oral, Daily    umeclidinium bromide (Incruse Ellipta) 62.5 MCG/ACT inhaler 1 puff, 1 puff, Inhalation, Daily    melatonin tab 3 mg, 3  mg, Oral, Nightly PRN    ondansetron (Zofran) 4 MG/2ML injection 4 mg, 4 mg, Intravenous, Q6H PRN    HYDROmorphone (Dilaudid) 1 MG/ML injection 0.2 mg, 0.2 mg, Intravenous, Q2H PRN **OR** HYDROmorphone (Dilaudid) 1 MG/ML injection 0.4 mg, 0.4 mg, Intravenous, Q2H PRN **OR** HYDROmorphone (Dilaudid) 1 MG/ML injection 0.8 mg, 0.8 mg, Intravenous, Q2H PRN    Prescriptions Prior to Admission[2]     PHYSICAL EXAM:     Vital Signs: /82 (BP Location: Radial)   Pulse 86   Temp 97.5 °F (36.4 °C) (Oral)   Resp 18   Ht 6' 2\" (1.88 m)   Wt 240 lb 11.9 oz (109.2 kg)   SpO2 100%   BMI 30.91 kg/m²   Temp (24hrs), Av.8 °F (36.6 °C), Min:97.5 °F (36.4 °C), Max:98.1 °F (36.7 °C)       Intake/Output Summary (Last 24 hours) at 2024 1341  Last data filed at 2024 0841  Gross per 24 hour   Intake 222 ml   Output 800 ml   Net -578 ml     Wt Readings from Last 3 Encounters:   24 240 lb 11.9 oz (109.2 kg)   24 240 lb (108.9 kg)   10/24/24 240 lb (108.9 kg)       General: no acute distress  HEENT: normocephalic, atraumatic  CV: RRR  Respiratory: no respiratory distress  Abdomen: soft, non-tender  Extremities: no edema bilaterally  Skin: warm, dry  Neuro: awake, alert    LABORATORY DATA:     Lab Results   Component Value Date     (H) 2024    BUN 63 (H) 2024    BUNCREA 13.0 2022    CREATSERUM 10.28 (H) 2024    ANIONGAP 11 2024    GFR 59 (L) 2018    GFRNAA 30 (L) 2022    GFRAA 35 (L) 2022    CA 9.0 2024    OSMOCALC 307 (H) 2024    ALKPHO 91 2024    AST 20 2024    ALT 17 2024    BILT 0.4 2024    TP 6.7 2024    ALB 4.1 2024    GLOBULIN 2.8 2024     2024    K 4.5 2024     2024    CO2 24.0 2024     Lab Results   Component Value Date    WBC 7.1 2024    RBC 3.14 (L) 2024    HGB 9.9 (L) 2024    HCT 29.1 (L) 2024    .0 2024    MPV  11.5 12/14/2012    MCV 92.7 11/18/2024    MCH 31.5 11/18/2024    MCHC 34.0 11/18/2024    RDW 13.5 11/18/2024    NEPRELIM 3.36 11/17/2024    NEPERCENT 52.6 11/17/2024    LYPERCENT 24.9 11/17/2024    MOPERCENT 12.5 11/17/2024    EOPERCENT 8.2 11/17/2024    BAPERCENT 1.6 11/17/2024    NE 3.36 11/17/2024    LYMABS 1.59 11/17/2024    MOABSO 0.80 11/17/2024    EOABSO 0.52 11/17/2024    BAABSO 0.10 11/17/2024     Lab Results   Component Value Date    MALBP 167.00 05/24/2023    CREUR 142.00 05/24/2023    CREUR 142.00 05/24/2023     Lab Results   Component Value Date    COLORUR Light-Yellow 11/17/2024    CLARITY Clear 11/17/2024    SPECGRAVITY 1.013 11/17/2024    GLUUR Normal 11/17/2024    BILUR Negative 11/17/2024    KETUR Negative 11/17/2024    BLOODURINE Negative 11/17/2024    PHURINE 8.5 (H) 11/17/2024    PROUR 100 (A) 11/17/2024    UROBILINOGEN Normal 11/17/2024    NITRITE Negative 11/17/2024    LEUUR Negative 11/17/2024    WBCUR 1-5 11/17/2024    RBCUR 0-2 11/17/2024    EPIUR Few (A) 11/17/2024    BACUR Rare (A) 11/17/2024    CAOXUR Occasional (A) 04/20/2018    HYLUR Present (A) 05/14/2019       IMAGING:     Reviewed    ASSESSMENT:     # ESRD on HD  -MWF schedule outpatient  -[facility] AV      # HTN/Volume Status  -Home medications: carvedilol, hydralazine, losartan, nifedipine     # Anemia     # Secondary Hyperparathyroidism    PLAN:     -HD per MWF schedule - HD today  -UF with HD as tolerated  -Continue home BP medications   -Defer OWEN in setting of high BPs  -Continue sevelamer 800 TID  -Avoid nephrotoxins and renally dose medications for creatinine clearance  -Monitor intake and output daily  -Daily weights                   We will continue to follow.    Thank you for allowing us to participate in the care of this patient.         Samantha Muñoz DO  [facility] - Nephrology               [1]   Allergies  Allergen Reactions    Hydrochlorothiazide RASH and HIVES   [2]   Medications Prior to  Admission   Medication Sig Dispense Refill Last Dose/Taking    hydrALAZINE 50 MG Oral Tab Take 1 tablet (50 mg total) by mouth every 8 (eight) hours. 90 tablet 0 11/15/2024    memantine 5 MG Oral Tab Take 1 tablet (5 mg total) by mouth 2 (two) times daily.   11/15/2024    Lisdexamfetamine Dimesylate 70 MG Oral Cap Take 1 capsule (70 mg total) by mouth every morning.   11/15/2024 Morning    albuterol 108 (90 Base) MCG/ACT Inhalation Aero Soln Inhale 2 puffs into the lungs every 6 (six) hours as needed for Wheezing.   Past Week    tamsulosin 0.4 MG Oral Cap Take 1 capsule (0.4 mg total) by mouth daily.   11/15/2024    ARIPiprazole 15 MG Oral Tab Take 1 tablet (15 mg total) by mouth daily.   11/15/2024    buPROPion  MG Oral Tablet 24 Hr Take 1 tablet (150 mg total) by mouth daily.   11/15/2024    lamoTRIgine (LAMICTAL) 150 MG Oral Tab Take 1 tablet (150 mg total) by mouth daily. (Patient taking differently: Take 1 tablet (150 mg total) by mouth daily. Reduced to 100mg/day for weening) 7 tablet 0 11/15/2024    FLUoxetine HCl 40 MG Oral Cap Take 1 capsule (40 mg total) by mouth daily. 7 capsule 0 11/15/2024    RENVELA 800 MG Oral Tab Take 1 tablet (800 mg total) by mouth 3 (three) times daily with meals.   11/15/2024 Evening    losartan 100 MG Oral Tab Take 1 tablet (100 mg total) by mouth daily. Hold if systolic blood pressure <130   11/15/2024    NIFEdipine ER 60 MG Oral Tablet 24 Hr Take 1 tablet (60 mg total) by mouth in the morning and 1 tablet (60 mg total) before bedtime. Hold if systolic blood pressure <130.   11/15/2024    carvedilol 25 MG Oral Tab Take 1 tablet (25 mg total) by mouth in the morning and 1 tablet (25 mg total) in the evening. Take with meals. 60 tablet 6 11/15/2024    Fenofibrate 134 MG Oral Cap Take 1 capsule by mouth nightly. 90 capsule 1 11/15/2024    Fluticasone Propionate 50 MCG/ACT Nasal Suspension SPRAY ONCE INTO EACH NOSTRIL BID PRN 15.8 mL 0 11/16/2024    tiotropium 18 MCG  Inhalation Cap Inhale 1 capsule (18 mcg total) into the lungs daily.   Unknown

## 2024-11-18 NOTE — PROGRESS NOTES
[facility] Hospitalist Progress Note     PCP: Adrian Small MD    Chief Complaint: follow-up   Follow up for: The primary encounter diagnosis was Acute chest pain. A diagnosis of Gastrointestinal hemorrhage, unspecified gastrointestinal hemorrhage type was also pertinent to this visit.    Overnight/Interim Events:      SUBJECTIVE:  Reports burning pain, abd \"on fire\". Dilaudid helps. Supplements help, report was using at home. Red blood in stool.     OBJECTIVE:  Temp:  [97.5 °F (36.4 °C)-98.1 °F (36.7 °C)] 97.5 °F (36.4 °C)  Pulse:  [80-98] 86  Resp:  [18-20] 18  BP: (119-136)/(73-85) 136/82  SpO2:  [97 %-100 %] 100 %    Intake/Output:    Intake/Output Summary (Last 24 hours) at 11/18/2024 1725  Last data filed at 11/18/2024 0841  Gross per 24 hour   Intake --   Output 800 ml   Net -800 ml       Last 3 Weights   11/18/24 0525 240 lb 11.9 oz (109.2 kg)   11/16/24 1723 240 lb 15.4 oz (109.3 kg)   11/16/24 1236 240 lb (108.9 kg)   11/06/24 1743 240 lb (108.9 kg)   11/06/24 1155 240 lb (108.9 kg)   10/24/24 2355 240 lb (108.9 kg)       Exam    General: Alert, no distress, appears stated age.     Head:  Normocephalic, without obvious abnormality, atraumatic.   Eyes:  Sclera anicteric, EOMs intact.    Nose: Nares normal,  Mucosa normal    Throat: Lips normal   Neck: Supple, symmetrical, trachea midline   Lungs:   Clear to auscultation bilaterally. Normal effort   Chest wall:  No tenderness or deformity   Heart:  Regular rate and rhythm, S1, S2 normal, no murmur, rub or gallop appreciated   Abdomen:   Soft, NT/ND, Bowel sounds normal. No masses,  No organomegaly.    Extremities: Extremities normal, atraumatic, no cyanosis or LE edema.   Skin: Skin color, texture, turgor normal. No rashes or lesions.    Neurologic: Moving all extremities spontaneously, no focal deficit appreciated      Data Review:       Labs:     Recent Labs   Lab 11/16/24  1300 11/17/24  0657 11/18/24  0521   WBC 7.0 6.4 7.1   HGB 9.9* 9.4* 9.9*   MCV 94.5  95.9 92.7   .0 223.0 246.0       Recent Labs   Lab 11/16/24  1300 11/17/24  0443 11/18/24  0521    139 139   K 4.6 4.4 4.5    104 104   CO2 25.0 26.0 24.0   BUN 57* 54* 63*   CREATSERUM 9.59* 9.59* 10.28*   CA 8.6 8.8 9.0   MG  --  2.0  --    PHOS  --  7.5* 7.4*   * 100* 122*       Recent Labs   Lab 11/16/24  1300 11/17/24  0443 11/18/24  0521   ALT 31 17  --    AST 23 20  --    ALB 3.6 3.9 4.1       Recent Labs   Lab 11/16/24  2113   PGLU 100*       No results for input(s): \"TROP\" in the last 168 hours.      Meds:      hydrocortisone  25 mg Rectal BID    lidocaine-menthol  1 patch Transdermal Daily    ARIPiprazole  15 mg Oral Daily    buPROPion ER  150 mg Oral Daily    carvedilol  25 mg Oral 2x Daily(Beta Blocker)    hydrALAZINE  50 mg Oral Q8H MARTHA    lamoTRIgine  150 mg Oral Daily    losartan  100 mg Oral Daily    memantine  5 mg Oral BID    NIFEdipine ER  60 mg Oral BID    sevelamer carbonate  800 mg Oral TID CC    tamsulosin  0.4 mg Oral Daily    umeclidinium bromide  1 puff Inhalation Daily         sodium chloride **AND** albumin human    albuterol    melatonin    ondansetron    HYDROmorphone **OR** HYDROmorphone **OR** HYDROmorphone       Assessment/Plan:     44-year-old man with ESRD on HD, well-known to service, who presents with recurrent bright red blood per rectum.     Brbpr  -Seen by GI.  Has had recent endoscopies so no further are recommended at this time.  Suspect the bleeding is related to hemorrhoids and again recommend regular suppository use.  Patient agreeable to it at this time and they have been ordered  - Also discussed resuming high-fiber diet.  Nutrition consult might be helpful and patient encouraged to continue use of fiber supplements, either reducing Metamucil frequency if it is too much or trying perhaps a different type of fiber supplement.  -Hemoglobin stable  -EGD/colonoscopy with pt's outpt GI doctor Usha Gallardo in [place] in August and capsule endoscopy with  us here in August all unremarkable.      High trops  H/o severe MR s/p MVR, redo complex robotically assisted MVR 2022  Chronic HFpEF  - h/o cath 2023 with mild irregularities  - EKG okay  - echo today  - has h/o chronic trop elevations     Finding of possible \"cystitis\" on imaging  - seen on previous CT as well  -Patient has had cystoscopy that looked okay in the past per his report.  He also has been treated for possible prostatitis.  He does seem to have more chronic discomfort in the suprapubic area.  -UA rare bact, few sq     LLQ lump- lipoma versus other subcutaneous nodule (?could it be from previous heparin injection site)  -No findings on imaging to suggest a deeper process.  And he seems to be tender over a specific site.  - Would trial lidocaine patch for supportive management over the area.  - o/p f/u      Esrd on hd- mwf.  As patient will likely need to remain in hospital overnight, will ask nephrology to see to help coordinate dialysis in the hospital.     SCDs        Dispo: CTU, dc planning    Questions/concerns were discussed with patient by bedside. D/w RN. Reviewed chart including previous progress notes      Total Time spent with patient and coordinating care:  55 minutes    Yuriy Forrest MD  [facility] Hospitalist  [phone]  11/18/2024  5:25 PM

## 2024-11-18 NOTE — PLAN OF CARE
Assumed patient care at 2330  Pt oriented x 4  Sinus on tele, vitals stable, room air  ESRD on HD MWF    Abd pain management with prn dilaudid   Lidocaine patch per order   Safety precautions in place  Call light is within reach   Pt updated on poc   Needs met at this time     Problem: PAIN - ADULT  Goal: Verbalizes/displays adequate comfort level or patient's stated pain goal  Description: INTERVENTIONS:  - Encourage pt to monitor pain and request assistance  - Assess pain using appropriate pain scale  - Administer analgesics based on type and severity of pain and evaluate response  - Implement non-pharmacological measures as appropriate and evaluate response  - Consider cultural and social influences on pain and pain management  - Manage/alleviate anxiety  - Utilize distraction and/or relaxation techniques  - Monitor for opioid side effects  - Notify MD/LIP if interventions unsuccessful or patient reports new pain  - Anticipate increased pain with activity and pre-medicate as appropriate  Outcome: Progressing     Problem: HEMATOLOGIC - ADULT  Goal: Free from bleeding injury  Description: (Example usage: patient with low platelets)  INTERVENTIONS:  - Avoid intramuscular injections, enemas and rectal medication administration  - Ensure safe mobilization of patient  - Hold pressure on venipuncture sites to achieve adequate hemostasis  - Assess for signs and symptoms of internal bleeding  - Monitor lab trends  - Patient is to report abnormal signs of bleeding to staff  - Avoid use of toothpicks and dental floss  - Use electric shaver for shaving  - Use soft bristle tooth brush  - Limit straining and forceful nose blowing  Outcome: Progressing

## 2024-11-18 NOTE — PROGRESS NOTES
Cardiology Progress Note    Godwin Fonseca Patient Status:  Inpatient    1980 MRN [id number]   Location [facility] 8NE-A Attending Laila Ryder MD   Hosp Day # 2 PCP Adrian Small MD     IMPRESSIONS:  Abd pain and rectal bleeding. Hgb stable   Colonic diverticulosis   Chest pain: resolved   Elevated troponin, flat curve. Cath  for elevated troponin with mild irregularities  h/o severe MR s/p MVR , redo complex robotically assisted MVR ([facility]2022)   Chronic HFpEF  HTN  Bipolar disease  ESRD on HD  DVT/PE   History TIA           PLAN:  ECG reviewed. No  significant ischemic changes   Given chronic elevations troponin and only mild \"irregularities\" at [facility]  for elevated troponin, will forgo ischemic eval given normal EF on echo without wall motion abnormalities . Continue to monitor for chest pains   Moderate LVH and mild to moderate MR  Hold off on IV heparin given ongoing bleeding  Await GI eval  BP meds reviewed. Adjust as needed      Subjective:  Still with abdominal pain     Objective:  /83   Pulse 98   Temp 97.8 °F (36.6 °C) (Oral)   Resp 18   Ht 6' 2\" (1.88 m)   Wt 240 lb 11.9 oz (109.2 kg)   SpO2 100%   BMI 30.91 kg/m²   Temp (24hrs), Av.9 °F (36.6 °C), Min:97.6 °F (36.4 °C), Max:98.1 °F (36.7 °C)      Intake/Output Summary (Last 24 hours) at 2024 0811  Last data filed at 2024 1608  Gross per 24 hour   Intake 222 ml   Output --   Net 222 ml     Wt Readings from Last 3 Encounters:   24 240 lb 11.9 oz (109.2 kg)   24 240 lb (108.9 kg)   10/24/24 240 lb (108.9 kg)       General: Awake and alert; in no acute distress  Cardiac: Regular rate and regular rhythm; no murmurs/rubs/gallops are appreciated  Lungs: Clear to auscultation bilaterally; no accessory muscle use  Abdomen: Soft, non-tender; bowel sounds are normoactive  Extremities: No clubbing/cyanosis; moves all 4 extremities normally    Current Facility-Administered Medications    Medication Dose Route Frequency    hydrocortisone (Anusol-HC) 25 MG rectal suppository 25 mg  25 mg Rectal BID    lidocaine-menthol 4-1 % patch 1 patch  1 patch Transdermal Daily    albuterol (Ventolin HFA) 108 (90 Base) MCG/ACT inhaler 2 puff  2 puff Inhalation Q6H PRN    ARIPiprazole (Abilify) tab 15 mg  15 mg Oral Daily    buPROPion ER (Wellbutrin XL) 24 hr tab 150 mg  150 mg Oral Daily    carvedilol (Coreg) tab 25 mg  25 mg Oral 2x Daily(Beta Blocker)    hydrALAZINE (Apresoline) tab 50 mg  50 mg Oral Q8H MARTHA    lamoTRIgine (LaMICtal) tab 150 mg  150 mg Oral Daily    losartan (Cozaar) tab 100 mg  100 mg Oral Daily    memantine (Namenda) tab 5 mg  5 mg Oral BID    NIFEdipine ER (Procardia-XL) 24 hr tab 60 mg  60 mg Oral BID    sevelamer carbonate (Renvela) tab 800 mg  800 mg Oral TID CC    tamsulosin (Flomax) cap 0.4 mg  0.4 mg Oral Daily    umeclidinium bromide (Incruse Ellipta) 62.5 MCG/ACT inhaler 1 puff  1 puff Inhalation Daily    melatonin tab 3 mg  3 mg Oral Nightly PRN    ondansetron (Zofran) 4 MG/2ML injection 4 mg  4 mg Intravenous Q6H PRN    HYDROmorphone (Dilaudid) 1 MG/ML injection 0.2 mg  0.2 mg Intravenous Q2H PRN    Or    HYDROmorphone (Dilaudid) 1 MG/ML injection 0.4 mg  0.4 mg Intravenous Q2H PRN    Or    HYDROmorphone (Dilaudid) 1 MG/ML injection 0.8 mg  0.8 mg Intravenous Q2H PRN       Laboratory Data:  Lab Results   Component Value Date    WBC 7.1 11/18/2024    HGB 9.9 11/18/2024    HCT 29.1 11/18/2024    .0 11/18/2024     Lab Results   Component Value Date    INR 1.00 11/06/2024    INR 1.06 09/29/2024    INR 1.11 09/15/2024     Lab Results   Component Value Date     11/18/2024    K 4.5 11/18/2024     11/18/2024    CO2 24.0 11/18/2024    BUN 63 11/18/2024    CREATSERUM 10.28 11/18/2024     11/18/2024    CA 9.0 11/18/2024       Telemetry: No malignant tachyarrhythmias or bradyarrhythmias      Thank you for allowing our practice to participate in the care of your  patient. Please do not hesitate to contact me if you have any questions.    Yesenia Rubin MD

## 2024-11-19 VITALS
HEIGHT: 74 IN | SYSTOLIC BLOOD PRESSURE: 108 MMHG | RESPIRATION RATE: 20 BRPM | OXYGEN SATURATION: 100 % | DIASTOLIC BLOOD PRESSURE: 63 MMHG | BODY MASS INDEX: 31.51 KG/M2 | WEIGHT: 245.56 LBS | HEART RATE: 96 BPM | TEMPERATURE: 98 F

## 2024-11-19 LAB
ANION GAP SERPL CALC-SCNC: 5 MMOL/L (ref 0–18)
BUN BLD-MCNC: 41 MG/DL (ref 9–23)
CALCIUM BLD-MCNC: 9.7 MG/DL (ref 8.7–10.4)
CHLORIDE SERPL-SCNC: 101 MMOL/L (ref 98–112)
CO2 SERPL-SCNC: 27 MMOL/L (ref 21–32)
CREAT BLD-MCNC: 7.57 MG/DL
EGFRCR SERPLBLD CKD-EPI 2021: 8 ML/MIN/1.73M2 (ref 60–?)
GLUCOSE BLD-MCNC: 172 MG/DL (ref 70–99)
OSMOLALITY SERPL CALC.SUM OF ELEC: 290 MOSM/KG (ref 275–295)
POTASSIUM SERPL-SCNC: 4.5 MMOL/L (ref 3.5–5.1)
SODIUM SERPL-SCNC: 133 MMOL/L (ref 136–145)

## 2024-11-19 PROCEDURE — 96376 TX/PRO/DX INJ SAME DRUG ADON: CPT

## 2024-11-19 PROCEDURE — 80048 BASIC METABOLIC PNL TOTAL CA: CPT | Performed by: STUDENT IN AN ORGANIZED HEALTH CARE EDUCATION/TRAINING PROGRAM

## 2024-11-19 RX ORDER — LISDEXAMFETAMINE DIMESYLATE 60 MG/1
60 CAPSULE ORAL EVERY MORNING
COMMUNITY
Start: 2024-11-13

## 2024-11-19 RX ORDER — LAMOTRIGINE 100 MG/1
100 TABLET ORAL DAILY
Status: SHIPPED | COMMUNITY
Start: 2024-11-20

## 2024-11-19 RX ORDER — HYDROCODONE BITARTRATE AND ACETAMINOPHEN 5; 325 MG/1; MG/1
1 TABLET ORAL EVERY 4 HOURS PRN
Qty: 15 TABLET | Refills: 0 | Status: SHIPPED | OUTPATIENT
Start: 2024-11-19

## 2024-11-19 RX ORDER — LAMOTRIGINE 150 MG/1
150 TABLET ORAL DAILY
Status: SHIPPED | COMMUNITY
Start: 2024-11-19 | End: 2024-11-19 | Stop reason: DRUGHIGH

## 2024-11-19 RX ORDER — HYDROCORTISONE ACETATE 25 MG/1
25 SUPPOSITORY RECTAL 2 TIMES DAILY
Qty: 60 SUPPOSITORY | Refills: 0 | Status: SHIPPED | OUTPATIENT
Start: 2024-11-19 | End: 2024-12-19

## 2024-11-19 RX ORDER — HYDROCODONE BITARTRATE AND ACETAMINOPHEN 5; 325 MG/1; MG/1
2 TABLET ORAL EVERY 4 HOURS PRN
Status: DISCONTINUED | OUTPATIENT
Start: 2024-11-19 | End: 2024-11-19

## 2024-11-19 RX ORDER — LAMOTRIGINE 100 MG/1
100 TABLET ORAL DAILY
Status: ON HOLD | COMMUNITY
Start: 2024-11-20 | End: 2024-11-19

## 2024-11-19 RX ORDER — HYDROCODONE BITARTRATE AND ACETAMINOPHEN 5; 325 MG/1; MG/1
1 TABLET ORAL EVERY 4 HOURS PRN
Status: DISCONTINUED | OUTPATIENT
Start: 2024-11-19 | End: 2024-11-19

## 2024-11-19 NOTE — PLAN OF CARE
Assumed patient at 1930. Alert and oriented x 4 on room air. Lung sounds clear bilaterally. Normal sinus on tele. Patient states LUQ pain, prn medication given. Continent of bowel and bladder. Up standby assist. Patient updated on plan of care and verbalized understanding.     POC: HD MWF, pain control    Problem: HEMATOLOGIC - ADULT  Goal: Free from bleeding injury  Description: (Example usage: patient with low platelets)  INTERVENTIONS:  - Avoid intramuscular injections, enemas and rectal medication administration  - Ensure safe mobilization of patient  - Hold pressure on venipuncture sites to achieve adequate hemostasis  - Assess for signs and symptoms of internal bleeding  - Monitor lab trends  - Patient is to report abnormal signs of bleeding to staff  - Avoid use of toothpicks and dental floss  - Use electric shaver for shaving  - Use soft bristle tooth brush  - Limit straining and forceful nose blowing  Outcome: Progressing     Problem: PAIN - ADULT  Goal: Verbalizes/displays adequate comfort level or patient's stated pain goal  Description: INTERVENTIONS:  - Encourage pt to monitor pain and request assistance  - Assess pain using appropriate pain scale  - Administer analgesics based on type and severity of pain and evaluate response  - Implement non-pharmacological measures as appropriate and evaluate response  - Consider cultural and social influences on pain and pain management  - Manage/alleviate anxiety  - Utilize distraction and/or relaxation techniques  - Monitor for opioid side effects  - Notify MD/LIP if interventions unsuccessful or patient reports new pain  - Anticipate increased pain with activity and pre-medicate as appropriate  Outcome: Progressing

## 2024-11-19 NOTE — PROGRESS NOTES
Cardiology Progress Note    Godwin Fonseca Patient Status:  Inpatient    1980 MRN [id number]   Location [facility] 8NE-A Attending Laila Ryder MD   Hosp Day # 2 PCP Adrian Small MD     IMPRESSIONS:  Abd pain and rectal bleeding. Hgb stable. No further bleeding reported cine yesterday   Colonic diverticulosis   Chest pain: resolved   Elevated troponin, flat curve. Cath  for elevated troponin with mild irregularities  h/o severe MR s/p MVR , redo complex robotically assisted MVR (Danay )   Chronic HFpEF: euvolemic   HTN  Bipolar disease  ESRD on HD  DVT/PE   History TIA           PLAN:  Given chronic elevations troponin and only mild \"irregularities\" at [facility]  for elevated troponin, will forgo ischemic eval given normal EF on echo without wall motion abnormalities . Continue to monitor for chest pains. None reported today   Moderate LVH and mild to moderate MR  Tele   Continue coreg, hydralazine, losartan, and nifedipine   Stable from CV standpoint for DC. Will need 2-4 week follow up after DC      Subjective:  Abdominal pain is improved     Objective:  /86 (BP Location: Radial)   Pulse 91   Temp 97.6 °F (36.4 °C) (Oral)   Resp 20   Ht 6' 2\" (1.88 m)   Wt 245 lb 9.5 oz (111.4 kg)   SpO2 100%   BMI 31.53 kg/m²   Temp (24hrs), Av.8 °F (36.6 °C), Min:97.6 °F (36.4 °C), Max:98.2 °F (36.8 °C)      Intake/Output Summary (Last 24 hours) at 2024 1320  Last data filed at 2024 1211  Gross per 24 hour   Intake 600 ml   Output --   Net 600 ml     Wt Readings from Last 3 Encounters:   24 245 lb 9.5 oz (111.4 kg)   24 240 lb (108.9 kg)   10/24/24 240 lb (108.9 kg)       General: Awake and alert; in no acute distress  Cardiac: Regular rate and regular rhythm; no murmurs/rubs/gallops are appreciated  Lungs: Clear to auscultation bilaterally; no accessory muscle use  Abdomen: Soft, tender to touch but stable from prior   Extremities: No clubbing/cyanosis;  moves all 4 extremities normally    Current Facility-Administered Medications   Medication Dose Route Frequency    HYDROcodone-acetaminophen (Norco) 5-325 MG per tab 1 tablet  1 tablet Oral Q4H PRN    Or    HYDROcodone-acetaminophen (Norco) 5-325 MG per tab 2 tablet  2 tablet Oral Q4H PRN    sodium chloride 0.9 % IV bolus 100 mL  100 mL Intravenous Q30 Min PRN    And    albumin human (Albumin) 25% injection 25 g  25 g Intravenous PRN Dialysis    hydrocortisone (Anusol-HC) 25 MG rectal suppository 25 mg  25 mg Rectal BID    lidocaine-menthol 4-1 % patch 1 patch  1 patch Transdermal Daily    albuterol (Ventolin HFA) 108 (90 Base) MCG/ACT inhaler 2 puff  2 puff Inhalation Q6H PRN    ARIPiprazole (Abilify) tab 15 mg  15 mg Oral Daily    buPROPion ER (Wellbutrin XL) 24 hr tab 150 mg  150 mg Oral Daily    carvedilol (Coreg) tab 25 mg  25 mg Oral 2x Daily(Beta Blocker)    hydrALAZINE (Apresoline) tab 50 mg  50 mg Oral Q8H MARTHA    lamoTRIgine (LaMICtal) tab 150 mg  150 mg Oral Daily    losartan (Cozaar) tab 100 mg  100 mg Oral Daily    memantine (Namenda) tab 5 mg  5 mg Oral BID    NIFEdipine ER (Procardia-XL) 24 hr tab 60 mg  60 mg Oral BID    sevelamer carbonate (Renvela) tab 800 mg  800 mg Oral TID CC    tamsulosin (Flomax) cap 0.4 mg  0.4 mg Oral Daily    umeclidinium bromide (Incruse Ellipta) 62.5 MCG/ACT inhaler 1 puff  1 puff Inhalation Daily    melatonin tab 3 mg  3 mg Oral Nightly PRN    ondansetron (Zofran) 4 MG/2ML injection 4 mg  4 mg Intravenous Q6H PRN    HYDROmorphone (Dilaudid) 1 MG/ML injection 0.2 mg  0.2 mg Intravenous Q2H PRN    Or    HYDROmorphone (Dilaudid) 1 MG/ML injection 0.4 mg  0.4 mg Intravenous Q2H PRN    Or    HYDROmorphone (Dilaudid) 1 MG/ML injection 0.8 mg  0.8 mg Intravenous Q2H PRN       Laboratory Data:       Lab Results   Component Value Date    INR 1.00 11/06/2024    INR 1.06 09/29/2024    INR 1.11 09/15/2024     Lab Results   Component Value Date     11/19/2024    K 4.5 11/19/2024      11/19/2024    CO2 27.0 11/19/2024    BUN 41 11/19/2024    CREATSERUM 7.57 11/19/2024     11/19/2024    CA 9.7 11/19/2024       Telemetry: No malignant tachyarrhythmias or bradyarrhythmias      Thank you for allowing our practice to participate in the care of your patient. Please do not hesitate to contact me if you have any questions.    Yesenia Rubin MD

## 2024-11-19 NOTE — DISCHARGE SUMMARY
[facility] Discharge Summary   Patient ID:  Godwin Fonseca  [id number]  44 year old  4/12/1980    Admit date: 11/16/2024    Discharge date and time: 11/19/2024     Attending Physician: Laila Ryder MD     Primary Care Physician: Adrian Small MD     Admit Dx: Acute chest pain [R07.9]  Gastrointestinal hemorrhage, unspecified gastrointestinal hemorrhage type [K92.2]    Hospital Discharge Diagnoses:  rectal bleed, abd pain    Disposition: home, lives c family     Important follow up:  -PCP in [x] within 7 days [] within 14 days [] other      Joe Salinas MD  [address]  [address]  [facility] [address]  [phone]    Follow up  As needed    Yesenia Rubin MD  [address]  [address]  [facility] [address]  [phone]    Follow up in 2 week(s)    Hosp course:  44-year-old man with ESRD on HD, well-known to service, who presents with recurrent bright red blood per rectum.     Brbpr  -Seen by GI.  Has had recent endoscopies so no further are recommended at this time.  Suspect the bleeding is related to hemorrhoids and again recommend regular suppository use.  Patient agreeable to it at this time and they have been ordered  - Also discussed resuming high-fiber diet.  Nutrition consult might be helpful and patient encouraged to continue use of fiber supplements, either reducing Metamucil frequency if it is too much or trying perhaps a different type of fiber supplement.  -Hemoglobin stable  -EGD/colonoscopy with pt's outpt GI doctor Usha Gallardo in [place] in August and capsule endoscopy with us here in August all unremarkable.       High trops  H/o severe MR s/p MVR, redo complex robotically assisted MVR 2022  Chronic HFpEF  - h/o cath 2023 with mild irregularities  - EKG okay  - echo EF 60-65%, G2DD  - has h/o chronic trop elevations     Finding of possible \"cystitis\" on imaging  - seen on previous CT as well  -Patient has had cystoscopy that looked okay in the past per his report.  He also has been  treated for possible prostatitis.  He does seem to have more chronic discomfort in the suprapubic area.  -UA rare bact, few sq     LLQ lump- lipoma versus other subcutaneous nodule (?could it be from previous heparin injection site)  -No findings on imaging to suggest a deeper process.  And he seems to be tender over a specific site.  - Would trial lidocaine patch for supportive management over the area.  - o/p f/u       Esrd on hd- mwf.  nephrology coordinating dialysis in the hospital.     SCDs        Day of discharge Exam   Vitals:    11/19/24 1211   BP: 127/86   Pulse: 91   Resp: 20   Temp: 97.6 °F (36.4 °C)       Exam on day of discharge:  Pain subsiding. -140. BM 2d ago, not really bloody. Walking.     Gen: No acute distress, alert and oriented  CV: RRR, +s1/s2  Lungs: CTAB, good respiratory effort  Abdomen: s/nt/nd  Ext: Moves all 4 extremities, no c/c/e  Neuro: CN Intact, no focal deficits      Discharge meds     Medication List        START taking these medications      HYDROcodone-acetaminophen 5-325 MG Tabs  Commonly known as: Norco  Take 1 tablet by mouth every 4 (four) hours as needed.     hydrocortisone 25 MG Supp  Commonly known as: Anusol-HC  Place 1 suppository (25 mg total) rectally 2 (two) times daily.            CONTINUE taking these medications      albuterol 108 (90 Base) MCG/ACT Aers  Commonly known as: Ventolin HFA     ARIPiprazole 15 MG Tabs  Commonly known as: Abilify     buPROPion  MG Tb24  Commonly known as: Wellbutrin XL     carvedilol 25 MG Tabs  Commonly known as: Coreg  Take 1 tablet (25 mg total) by mouth in the morning and 1 tablet (25 mg total) in the evening. Take with meals.     Fenofibrate 134 MG Caps  Take 1 capsule by mouth nightly.     FLUoxetine HCl 40 MG Caps  Commonly known as: PROZAC  Take 1 capsule (40 mg total) by mouth daily.     fluticasone propionate 50 MCG/ACT Susp  Commonly known as: Flonase  SPRAY ONCE INTO EACH NOSTRIL BID PRN     hydrALAZINE 50 MG  Tabs  Commonly known as: Apresoline  Take 1 tablet (50 mg total) by mouth every 8 (eight) hours.     LaMICtal 150 MG Tabs  Generic drug: lamoTRIgine     Lisdexamfetamine Dimesylate 70 MG Caps  Commonly known as: VYVANSE     losartan 100 MG Tabs  Commonly known as: Cozaar     memantine 5 MG Tabs  Commonly known as: Namenda     NIFEdipine ER 60 MG Tb24  Commonly known as: ADALAT CC     Renvela 800 MG Tabs  Generic drug: sevelamer carbonate     tamsulosin 0.4 MG Caps  Commonly known as: Flomax     tiotropium 18 MCG Caps  Commonly known as: Spiriva Handihaler               Where to Get Your Medications        These medications were sent to [id number] - [place], [place] - [address] [phone], [phone]  [address] [place] [id number]      Phone: [phone]   HYDROcodone-acetaminophen 5-325 MG Tabs  hydrocortisone 25 MG Supp         Consults: IP CONSULT TO CARDIOLOGY  IP CONSULT TO GASTROENTEROLOGY  IP CONSULT TO HOSPITALIST  IP CONSULT TO SOCIAL WORK  IP CONSULT TO NEPHROLOGY    Radiology: CARD ECHO 2D DOPPLER (CPT=93306)    Result Date: 2024  Transthoracic Echocardiogram Name:Godwin Fonseca Date: 2024 :  1980 Ht:  (74in)  BP: 138 / 77 MRN:  [id number]    Age:  44years    Wt:  (240lb) HR: 84bpm Loc:  [facility]       Gndr: M          BSA: 2.35m^2 Sonographer: Evelyne GIBBS Ordering:    Yrn Grewal Consulting:  Samaria Nava Referring:   Jagdeep Joseph ---------------------------------------------------------------------------- History/Indications:   Chest pain.  Prior Mitral Repair. Blood tests:    Troponin elevated. ---------------------------------------------------------------------------- Procedure information:  A transthoracic complete 2D study was performed. Additional evaluation included M-mode, complete spectral Doppler, and color Doppler.  Patient status:  Inpatient.  Location:  Bedside.    Comparison was made to the study of 03/10/2024.    This was a routine study.  Transthoracic echocardiography for ventricular function evaluation and assessment of valvular function. Image quality was adequate. ECG rhythm:   Normal sinus ---------------------------------------------------------------------------- Conclusions: 1. Left ventricle: The cavity size was normal. Wall thickness was moderately    increased. Systolic function was normal. The estimated ejection fraction    was 60-65%, by visual assessment. No diagnostic evidence for regional    wall motion abnormalities. Features are consistent with a pseudonormal    left ventricular filling pattern, with concomitant abnormal relaxation    and increased filling pressure - grade 2 diastolic dysfunction. 2. Left atrium: The atrium was markedly dilated. The left atrial volume was    markedly increased. 3. Mitral valve: There was mild to moderate regurgitation, with multiple    jets. The mean diastolic gradient was 3mm Hg at a heart rate of 78 bpm. * ---------------------------------------------------------------------------- * Findings: Left ventricle:  The cavity size was normal. Wall thickness was moderately increased. Systolic function was normal. The estimated ejection fraction was 60-65%, by visual assessment. No diagnostic evidence for regional wall motion abnormalities. Features are consistent with a pseudonormal left ventricular filling pattern, with concomitant abnormal relaxation and increased filling pressure - grade 2 diastolic dysfunction. Left atrium:  The atrium was markedly dilated. The left atrial volume was markedly increased. Right ventricle:  The cavity size was normal. Systolic function was normal. Right atrium:  The atrium was normal in size. Mitral valve:  An annular ring was present. The annulus was mildly calcified. Leaflet separation was normal.  Doppler:  Transvalvular velocity was within the normal range. There was no evidence for stenosis. There was mild to moderate regurgitation, with multiple jets.    The  valve area by pressure half-time was 2.18cm^2. The valve area index by pressure half-time was 0.93cm^2/m^2.    The mean diastolic gradient was 3mm Hg at a heart rate of 78 bpm. Aortic valve:  The valve was structurally normal. The valve was trileaflet. The leaflets were normal thickness. Cusp separation was normal.  Doppler: Transvalvular velocity was within the normal range. There was no evidence for stenosis. There was trivial regurgitation. Tricuspid valve:  The valve is structurally normal. Leaflet separation was normal.  Doppler:  Transvalvular velocity was within the normal range. There was no evidence for stenosis. There was trivial regurgitation. Pulmonic valve:   The valve is structurally normal. Cusp separation was normal.  Doppler:  Transvalvular velocity was within the normal range. There was no evidence for stenosis. There was trivial regurgitation. Pericardium:   There was no pericardial effusion. Aorta: Aortic root: The aortic root was normal. Ascending aorta: The ascending aorta was normal. Pulmonary arteries: Systolic pressure could not be accurately estimated. Systemic veins:  Central venous respirophasic diameter changes are in the normal range (>50%). Inferior vena cava: The IVC was normal-sized. ---------------------------------------------------------------------------- Measurements  Left ventricle       Value          Ref       03/10/2024  IVS thickness,   (H) 1.5   cm       0.6 - 1.0 1.8  ED, PLAX  LV ID, ED, PLAX      4.8   cm       4.2 - 5.8 4.3  LV ID, ES, PLAX      3.0   cm       2.5 - 4.0 2.9  LV PW thickness, (H) 1.5   cm       0.6 - 1.0 1.5  ED, PLAX  IVS/LV PW ratio,     0.98           --------- 1.20  ED, PLAX  LV PW/LV ID          0.31           --------- 0.36  ratio, ED, PLAX  LV ejection          67    %        52 - 72   61  fraction  LV e', lateral   (L) 7.3   cm/sec   >=10.0    ----------  LV E/e', lateral (H) 17             <=13      ----------  LV e', medial    (L) 6.0   cm/sec    >=7.0     ----------  LV E/e', medial      20             --------- ----------  LV e', average       6.6   cm/sec   --------- ----------  LV E/e', average (H) 18             <=14      ----------  LVOT                 Value          Ref       03/10/2024  LVOT ID              2.6   cm       --------- ----------  Aortic root          Value          Ref       03/10/2024  Aortic root ID,      3.5   cm       2.9 - 4.4 ----------  ED  Ascending aorta      Value          Ref       03/10/2024  Ascending aorta      3.2   cm       2.2 - 3.8 ----------  ID, A-P, ED  Left atrium          Value          Ref       03/10/2024  LA ID, A-P, ES   (H) 5.0   cm       3.0 - 4.0 ----------  LA volume, S     (H) 197   ml       18 - 58   ----------  LA volume/bsa, S (H) 84    ml/m^2   16 - 34   ----------  LA volume, ES,   (H) 190   ml       18 - 58   ----------  1-p A4C  LA volume, ES,   (H) 200   ml       18 - 58   ----------  1-p A2C  LA volume, ES,       212   ml       --------- ----------  A/L  LA volume/bsa,   (H) 90    ml/m^2   16 - 34   ----------  ES, A/L  LA/aortic root       1.43           --------- ----------  ratio  Mitral valve         Value          Ref       03/10/2024  Mitral E-wave        1.2   m/sec    --------- ----------  peak velocity  Mitral A-wave        0.97  m/sec    --------- ----------  peak velocity  Mitral pressure      101   ms       --------- ----------  half-time  Mitral mean          3     mm Hg    --------- 2  gradient, D  Mitral peak          6     mm Hg    --------- 4  gradient, D  Mitral E/A           1.2            --------- ----------  ratio, peak  Mitral valve         2.18  cm^2     --------- ----------  area, PHT, DP  Mitral valve         0.93  cm^2/m^2 --------- ----------  area/bsa, PHT,  DP  Mitral maximal       651   cm/sec   --------- ----------  regurg velocity,  PISA  Mitral regurg        33.1  cm       --------- 24.8  VTI, PISA  Systemic veins       Value          Ref       03/10/2024   Estimated CVP        3     mm Hg    --------- ----------  Right ventricle      Value          Ref       03/10/2024  TAPSE, 2D            2.20  cm       >=1.70    ---------- Legend: (L)  and  (H)  fahad values outside specified reference range. ---------------------------------------------------------------------------- Prepared and electronically signed by Yesenia Rubin MD 11/18/2024 08:21     CT ABDOMEN+PELVIS(CPT=74176)    Result Date: 11/16/2024  PROCEDURE:  CT ABDOMEN+PELVIS (CPT=74176)  COMPARISON:  [facility], CT, CT ABDOMEN+PELVIS(CONTRAST ONLY)(CPT=74177), 10/06/2024, 10:09 PM.  INDICATIONS:  abd pain - rectal bleeding - chest pain  TECHNIQUE:  Unenhanced multislice CT scanning was performed from the dome of the diaphragm to the pubic symphysis.  Dose reduction techniques were used. Dose information is transmitted to the ACR (American College of Radiology) NRDR (National Radiology Data Registry) which includes the Dose Index Registry.  PATIENT STATED HISTORY: (As transcribed by Technologist)  Abdominal pain S/P dialysis yesterday. 10/10 LLQ burning and stabbing pain that radiates to chest. Rectal bleeding noted at home today    FINDINGS:  LIVER:  No enlargement, atrophy, abnormal density, or significant focal lesion.  BILIARY:  No visible dilatation or calcification.  PANCREAS:  No lesion, fluid collection, ductal dilatation, or atrophy.  SPLEEN:  No enlargement or focal lesion.  KIDNEYS:  No mass, obstruction, or calcification.  ADRENALS:  No mass or enlargement.  AORTA/VASCULAR:    Unremarkable as seen on non-contrast imaging. RETROPERITONEUM:  No mass or adenopathy.  BOWEL/MESENTERY:  No dilated bowel or wall thickening.  Colonic diverticulosis, without diverticulitis.  Normal appendix. ABDOMINAL WALL:  No mass or hernia.  URINARY BLADDER:  Circumferential urinary bladder wall thickening. PELVIC NODES:  No adenopathy.  PELVIC ORGANS:  No visible mass.  Pelvic organs appropriate for patient age.  BONES:   No bony lesion or fracture.  LUNG BASES:  Right basilar atelectasis, similar to prior. OTHER:  Negative.             CONCLUSION:  1. Circumferential urinary bladder wall thickening, correlate for cystitis. 2. Colonic diverticulosis.   LOCATION:  [id number]   Dictated by (CST): Duy De La Rosa MD on 11/16/2024 at 2:47 PM     Finalized by (CST): Duy De La Rosa MD on 11/16/2024 at 2:50 PM       MRI BRAIN (CPT=70551)    Result Date: 11/6/2024  PROCEDURE:  MRI BRAIN (CPT=70551)  COMPARISON:  [facility] , MR, MRI BRAIN (CPT=70551), 8/24/2023, 9:12 PM.  [facility] , MR, MRI SPINE CERVICAL (CPT=72141), 10/19/2024, 8:25 PM.  [facility], CT, CT BRAIN OR HEAD ([id number]), 11/06/2024, 1:13 PM.  INDICATIONS:  slurred speech.  History of TIA.  Shaking and stuttering.  TECHNIQUE:  MRI of the brain was performed with multi-planar T1, T2-weighted images with FLAIR sequences and diffusion weighted images without infusion.  PATIENT STATED HISTORY: (As transcribed by Technologist)  Patient has history of TIA and states he has been having headache and neck pain with uncontrollable shaking and stuttering which has been worse for the past 2 days    FINDINGS:   The ventricles and sulci are normal in size for the patient's age.  No mass-effect, midline shift, hydrocephalus, herniation, extra-axial fluid collections, or signs of acute territorial infarct, or brain tumor.  No abnormality of midline structures. No sign of restricted diffusion. No pathologic gradient susceptibility pattern.  Flow voids are present within the internal carotid and basilar arteries. No sign of acute fluid in the paranasal sinuses. Postinflammatory changes are present within the paranasal sinuses, without findings indicating acute sinusitis.            CONCLUSION:  No abnormalities are seen.   LOCATION:  [id number]   Dictated by (CST): Joe London MD on 11/06/2024 at 3:44 PM     Finalized by (CST): Joe London MD on 11/06/2024 at 3:49 PM       CT BRAIN OR HEAD  (CPT=70450)    Result Date: 11/6/2024  PROCEDURE:  CT BRAIN OR HEAD (85347)  COMPARISON:  [facility], CT, CT BRAIN OR HEAD ([id number]), 3/16/2024, 1:45 AM.  INDICATIONS:  sob, dizziness, slurring  TECHNIQUE:  Noncontrast CT scanning is performed through the brain. Dose reduction techniques were used. Dose information is transmitted to the ACR (American College of Radiology) NRDR (National Radiology Data Registry) which includes the Dose Index Registry.  PATIENT STATED HISTORY: (As transcribed by Technologist)  Patient states he was at his neurologist and has had slurring of speech since Monday and got worse today with tremors.    FINDINGS:  Motion artifact slightly limits assessment. VENTRICLES/SULCI:  Ventricles and sulci are normal in size.  INTRACRANIAL:  There are no abnormal extraaxial fluid collections.  There is no midline shift.  There are no intraparenchymal brain abnormalities.  There is nothing specific for acute infarct.  There is no hemorrhage or mass lesion.  SINUSES:           No sign of acute sinusitis.  MASTOIDS:          No sign of acute inflammation. SKULL:             No evidence for fracture or osseous abnormality. OTHER:             None.            CONCLUSION:  Motion artifact slightly limits assessment.  Within the limitations of the exam there is no significant midline shift or mass effect.  No acute intracranial hemorrhage.  If there is persistent clinical concern then consider MRI.     LOCATION:  [facility]   Dictated by (CST): Smith Randle MD on 11/06/2024 at 1:25 PM     Finalized by (CST): Smith Randle MD on 11/06/2024 at 1:29 PM       XR CHEST AP PORTABLE  (CPT=71045)    Result Date: 11/6/2024  PROCEDURE:  XR CHEST AP PORTABLE  (CPT=71045)  TECHNIQUE:  AP chest radiograph was obtained.  COMPARISON:  [facility], XR, XR CHEST AP PORTABLE  (CPT=71045), 10/17/2024, 7:43 AM.  INDICATIONS:  sob, dizziness, slurring  PATIENT STATED HISTORY: (As transcribed by Technologist)  Patient came in the [facility] today  due to shortness of breath, dizziness and slurring of his words.  Patient offered no additional history.    FINDINGS:  Patient rotated to right.  Tunneled right internal jugular hemodialysis catheter remains in place.  Normal heart size and pulmonary vascularity.  Decreased right basilar opacity.  No new pulmonary opacity.  Mild blunting of right costophrenic angle.            CONCLUSION:  Decreased right basilar pneumonia.  Small right pleural effusion.   LOCATION:  [facility]      Dictated by (CST): Ricki Zaragoza MD on 11/06/2024 at 1:01 PM     Finalized by (CST): Ricki Zaragoza MD on 11/06/2024 at 1:02 PM       US DUPLEX SCAN HEMODIALYSIS ACCESS  (CPT=93990)    Result Date: 10/25/2024  PROCEDURE:  US DUPLEX SCAN HEMODIALYSIS ACCESS  (CPT=93990)  COMPARISON:  [facility], US DUPLEX SCAN HEMODIALYSIS ACCESS  (CPT=93990), 9/18/2024, 2:38 PM.  INDICATIONS:  angiogram on left forearm today co of finger numbness, denies taking pain medication pta, bleeding from rectum an hour ago, brusing on right forearm  TECHNIQUE:  Real time gray scale and duplex color Doppler sonography was used to evaluate the left upper extremity arterial and venous system. Being noted two-dimensional images of the vascular structures, Doppler spectral analysis, and color Doppler imaging were performed  PATIENT STATED HISTORY: (As transcribed by Technologist)  Patient has a left forearm AVF. States he had an angiogram earlier today to cannulate the fistula. Now states hand pain/numbness.    FINDINGS:   Left UPPER EXTREMITY ARTERIAL: Proximal brachial: diameter 8 mm, velocity 100 centimeters/sec, Mid brachial: diameter 8 mm, velocity 125 centimeters/sec, Distal brachial: diameter 8 mm, velocity 85 centimeters/sec, Proximal radial: diameter 6 mm, velocity 134 centimeters/sec Mid radial: diameter 6 mm, velocity 102 centimeters/sec Distal radial: diameter 4 mm, velocity 107 centimeters/sec Fistula/graft arterial anastomosis: diameter 5 mm, velocity 281  centimeters/sec Proximal graft: diameter 6 mm, velocity 216 centimeters/sec Mid graft: diameter 6 mm, velocity 100 centimeters/sec Distal graft: diameter 7 mm, velocity 86 centimeters/sec  Left UPPER EXTREMITY VENOUS: Proximal arm cephalic vein: diameter 3 mm, velocity 6 centimeters/sec Mid arm cephalic vein:  diameter 3 mm, velocity 8 centimeters/sec, Distal arm cephalic vein: diameter 4 mm, velocity 8 centimeters/sec Proximal forearm basilic vein: diameter 5 mm, velocity 14 centimeters/sec Mid forearm basilic vein: diameter 6 mm, velocity 14 centimeters/sec Distal basilic vein: diameter 6 mm, velocity 11 centimeters/sec  OTHER FINDINGS:            CONCLUSION:  The left radial artery to cephalic vein fistula is patent.  There is mild elevation of velocities at the arterial anastomosis could indicate a mild/moderate stenosis.   LOCATION:  [facility]    Dictated by (CST): Jaycob Cassidy MD on 10/25/2024 at 5:58 AM     Finalized by (CST): Jaycob Cassidy MD on 10/25/2024 at 6:03 AM          Operative Procedures:      Code Status: Full Code    Total Time Coordinating Care: Greater than 30 minutes    Patient had opportunity to ask questions and state understand and agree with therapeutic plan as outlined. I reviewed and reconciled current and discharge medications on day of discharge and discussed meds with patient. JASON/w RN and Yesenia Rubin MD Charles Yohannan, MD  [facility]  [phone]  11/19/2024  2:06 PM

## 2024-11-19 NOTE — PLAN OF CARE
Assumed care of patient at 0700. Pt A/Ox 4. O2 sats maintained on room air. NSR on tele. Last BM 11/17, pt getting scheduled suppository. Diminished urine production, HD. Pt reports severe left sided abdominal pain, PRN [place] given. Pt up independently, instructed to call with any changes. Pt updated on plan of care. Care needs met. Bed in lowest position, Call light within reach.     POC: HD MWF, pain control    Problem: HEMATOLOGIC - ADULT  Goal: Free from bleeding injury  Description: (Example usage: patient with low platelets)  INTERVENTIONS:  - Avoid intramuscular injections, enemas and rectal medication administration  - Ensure safe mobilization of patient  - Hold pressure on venipuncture sites to achieve adequate hemostasis  - Assess for signs and symptoms of internal bleeding  - Monitor lab trends  - Patient is to report abnormal signs of bleeding to staff  - Avoid use of toothpicks and dental floss  - Use electric shaver for shaving  - Use soft bristle tooth brush  - Limit straining and forceful nose blowing  Outcome: Progressing     Problem: PAIN - ADULT  Goal: Verbalizes/displays adequate comfort level or patient's stated pain goal  Description: INTERVENTIONS:  - Encourage pt to monitor pain and request assistance  - Assess pain using appropriate pain scale  - Administer analgesics based on type and severity of pain and evaluate response  - Implement non-pharmacological measures as appropriate and evaluate response  - Consider cultural and social influences on pain and pain management  - Manage/alleviate anxiety  - Utilize distraction and/or relaxation techniques  - Monitor for opioid side effects  - Notify MD/LIP if interventions unsuccessful or patient reports new pain  - Anticipate increased pain with activity and pre-medicate as appropriate  Outcome: Progressing     Problem: Patient/Family Goals  Goal: Patient/Family Long Term Goal  Description: Patient's Long Term Goal: discharge    Interventions:  -  appropriate consult, taking prescribed meds  - See additional Care Plan goals for specific interventions  Outcome: Progressing  Goal: Patient/Family Short Term Goal  Description: Patient's Short Term Goal: feel better    Interventions:   - pain control   - See additional Care Plan goals for specific interventions  Outcome: Progressing

## 2024-11-19 NOTE — PLAN OF CARE
Acquired patient around 0730. Alert and oriented x4. On Ra. SPO2 within normal limits. NSR on tele. Pt denies sob or cp. Pt co LUQ pain, PRN pain medication. Plan for scheduled suppository and hemodialysis today. Continent of bowel and bladder. Call light within reach. Continue plan of care.     Problem: HEMATOLOGIC - ADULT  Goal: Free from bleeding injury  Description: (Example usage: patient with low platelets)  INTERVENTIONS:  - Avoid intramuscular injections, enemas and rectal medication administration  - Ensure safe mobilization of patient  - Hold pressure on venipuncture sites to achieve adequate hemostasis  - Assess for signs and symptoms of internal bleeding  - Monitor lab trends  - Patient is to report abnormal signs of bleeding to staff  - Avoid use of toothpicks and dental floss  - Use electric shaver for shaving  - Use soft bristle tooth brush  - Limit straining and forceful nose blowing  Outcome: Progressing     Problem: PAIN - ADULT  Goal: Verbalizes/displays adequate comfort level or patient's stated pain goal  Description: INTERVENTIONS:  - Encourage pt to monitor pain and request assistance  - Assess pain using appropriate pain scale  - Administer analgesics based on type and severity of pain and evaluate response  - Implement non-pharmacological measures as appropriate and evaluate response  - Consider cultural and social influences on pain and pain management  - Manage/alleviate anxiety  - Utilize distraction and/or relaxation techniques  - Monitor for opioid side effects  - Notify MD/LIP if interventions unsuccessful or patient reports new pain  - Anticipate increased pain with activity and pre-medicate as appropriate  Outcome: Progressing

## 2024-11-19 NOTE — PLAN OF CARE
NURSING DISCHARGE NOTE    Pt cleared for discharge by all consults.   Discharged Home via Ambulatory.  Accompanied by RN  Belongings Taken by patient/family.    Tele and IV discontinued without any complications. Pt provided with discharge paperwork and educated on discharge instructions including medication changes and follow up appointments. All questions answered. Pt transferred to [facility].

## 2024-11-19 NOTE — PROGRESS NOTES
[facility] Nephrology  Inpatient Follow-up    Godwin Fonseca Patient Status:  Observation    1980 MRN [id number]   [facility] 8NE-A Attending Laila Ryder MD   Hosp Day # 2 PCP Adrian Small MD       SUBJECTIVE:     Patient seen and examined at bedside. No acute complaints today.     MEDICATIONS:     Current Facility-Administered Medications   Medication Dose Route Frequency    HYDROcodone-acetaminophen (Norco) 5-325 MG per tab 1 tablet  1 tablet Oral Q4H PRN    Or    HYDROcodone-acetaminophen (Norco) 5-325 MG per tab 2 tablet  2 tablet Oral Q4H PRN    sodium chloride 0.9 % IV bolus 100 mL  100 mL Intravenous Q30 Min PRN    And    albumin human (Albumin) 25% injection 25 g  25 g Intravenous PRN Dialysis    hydrocortisone (Anusol-HC) 25 MG rectal suppository 25 mg  25 mg Rectal BID    lidocaine-menthol 4-1 % patch 1 patch  1 patch Transdermal Daily    albuterol (Ventolin HFA) 108 (90 Base) MCG/ACT inhaler 2 puff  2 puff Inhalation Q6H PRN    ARIPiprazole (Abilify) tab 15 mg  15 mg Oral Daily    buPROPion ER (Wellbutrin XL) 24 hr tab 150 mg  150 mg Oral Daily    carvedilol (Coreg) tab 25 mg  25 mg Oral 2x Daily(Beta Blocker)    hydrALAZINE (Apresoline) tab 50 mg  50 mg Oral Q8H MARTHA    lamoTRIgine (LaMICtal) tab 150 mg  150 mg Oral Daily    losartan (Cozaar) tab 100 mg  100 mg Oral Daily    memantine (Namenda) tab 5 mg  5 mg Oral BID    NIFEdipine ER (Procardia-XL) 24 hr tab 60 mg  60 mg Oral BID    sevelamer carbonate (Renvela) tab 800 mg  800 mg Oral TID CC    tamsulosin (Flomax) cap 0.4 mg  0.4 mg Oral Daily    umeclidinium bromide (Incruse Ellipta) 62.5 MCG/ACT inhaler 1 puff  1 puff Inhalation Daily    melatonin tab 3 mg  3 mg Oral Nightly PRN    ondansetron (Zofran) 4 MG/2ML injection 4 mg  4 mg Intravenous Q6H PRN    HYDROmorphone (Dilaudid) 1 MG/ML injection 0.2 mg  0.2 mg Intravenous Q2H PRN    Or    HYDROmorphone (Dilaudid) 1 MG/ML injection 0.4 mg  0.4 mg Intravenous Q2H PRN     Or    HYDROmorphone (Dilaudid) 1 MG/ML injection 0.8 mg  0.8 mg Intravenous Q2H PRN       PHYSICAL EXAM:     Vital Signs: /84 (BP Location: Radial)   Pulse 89   Temp 97.6 °F (36.4 °C) (Oral)   Resp 20   Ht 6' 2\" (1.88 m)   Wt 245 lb 9.5 oz (111.4 kg)   SpO2 100%   BMI 31.53 kg/m²   Temp (24hrs), Av.8 °F (36.6 °C), Min:97.5 °F (36.4 °C), Max:98.2 °F (36.8 °C)       Intake/Output Summary (Last 24 hours) at 2024 1153  Last data filed at 2024 1000  Gross per 24 hour   Intake 480 ml   Output --   Net 480 ml     Wt Readings from Last 3 Encounters:   24 245 lb 9.5 oz (111.4 kg)   24 240 lb (108.9 kg)   10/24/24 240 lb (108.9 kg)       General: no acute distress  HEENT: normocephalic, atraumatic  CV: RRR  Respiratory: no distress  Abdomen: soft, non-tender  Extremities: no edema bilaterally  Skin: warm, dry  Neuro: awake, alert     LABORATORY DATA:     Lab Results   Component Value Date     (H) 2024    BUN 41 (H) 2024    BUNCREA 13.0 2022    CREATSERUM 7.57 (H) 2024    ANIONGAP 5 2024    GFR 59 (L) 2018    GFRNAA 30 (L) 2022    GFRAA 35 (L) 2022    CA 9.7 2024    OSMOCALC 290 2024    ALKPHO 91 2024    AST 20 2024    ALT 17 2024    BILT 0.4 2024    TP 6.7 2024    ALB 4.1 2024    GLOBULIN 2.8 2024     (L) 2024    K 4.5 2024     2024    CO2 27.0 2024     Lab Results   Component Value Date    WBC 7.1 2024    RBC 3.14 (L) 2024    HGB 9.9 (L) 2024    HCT 29.1 (L) 2024    .0 2024    MPV 11.5 2012    MCV 92.7 2024    MCH 31.5 2024    MCHC 34.0 2024    RDW 13.5 2024    NEPRELIM 3.36 2024    NEPERCENT 52.6 2024    LYPERCENT 24.9 2024    MOPERCENT 12.5 2024    EOPERCENT 8.2 2024    BAPERCENT 1.6 2024    NE 3.36 2024    LYMABS 1.59 2024     MOABSO 0.80 11/17/2024    EOABSO 0.52 11/17/2024    BAABSO 0.10 11/17/2024     Lab Results   Component Value Date    MALBP 167.00 05/24/2023    CREUR 142.00 05/24/2023    CREUR 142.00 05/24/2023     Lab Results   Component Value Date    COLORUR Light-Yellow 11/17/2024    CLARITY Clear 11/17/2024    SPECGRAVITY 1.013 11/17/2024    GLUUR Normal 11/17/2024    BILUR Negative 11/17/2024    KETUR Negative 11/17/2024    BLOODURINE Negative 11/17/2024    PHURINE 8.5 (H) 11/17/2024    PROUR 100 (A) 11/17/2024    UROBILINOGEN Normal 11/17/2024    NITRITE Negative 11/17/2024    LEUUR Negative 11/17/2024    WBCUR 1-5 11/17/2024    RBCUR 0-2 11/17/2024    EPIUR Few (A) 11/17/2024    BACUR Rare (A) 11/17/2024    CAOXUR Occasional (A) 04/20/2018    HYLUR Present (A) 05/14/2019       IMAGING:     Reviewed    ASSESSMENT:      # ESRD on HD  -MWF schedule outpatient  -[facility]  -L AVF   -R CVC     # HTN/Volume Status  -Home medications: carvedilol, hydralazine, losartan, nifedipine      # Anemia     # Secondary Hyperparathyroidism     PLAN:      -HD per MWF schedule  -UF with HD as tolerated  -Continue home BP medications   -Defer OWEN in setting of high BPs  -Continue sevelamer 800 TID  -Avoid nephrotoxins and renally dose medications for creatinine clearance  -Monitor intake and output daily  -Daily weights           Okay for discharge from nephrology perspective    Thank you for allowing us to participate in the care of this patient.       Samantha Muñoz DO   [facility] - Nephrology

## 2024-11-19 NOTE — SPIRITUAL CARE NOTE
Spiritual Care Visit Note    Patient Name: Godwin Fonseca Date of Spiritual Care Visit: 24   : 1980 Primary Dx: Acute chest pain       Referred By:      Spiritual Care Taxonomy:    Intended Effects: Build relationship of care and support    Methods: Offer spiritual/Nondenominational support;Offer support;Offer emotional support;Encourage sharing of feelings    Interventions: Explain  role;Silent prayer;Active listening;Ask guided questions    Visit Type/Summary:     - Spiritual Care:  followed up with patient for spiritual care support. 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 He has a good support system in place with other family members and neighbors but is concerned for her at night.  affirmed the patient feelings of concern.  encouraged patient to focus also on his own care. Patient thanked  for visit and concern.   remains available as needed for follow up.    Spiritual Care support can be requested via an Epic consult. For urgent/immediate needs, please contact the On Call  at: Edward: ext [phone]    Pr. Jessica Herbert Mdiv.

## 2024-11-27 ENCOUNTER — HOSPITAL ENCOUNTER (EMERGENCY)
Age: 44
Discharge: LEFT BEFORE DISCHARGE | End: 2024-11-27
Payer: MEDICARE

## 2024-12-01 ENCOUNTER — HOSPITAL ENCOUNTER (OUTPATIENT)
Facility: HOSPITAL | Age: 44
Setting detail: OBSERVATION
Discharge: HOME | End: 2024-12-07
Attending: EMERGENCY MEDICINE | Admitting: INTERNAL MEDICINE
Payer: MEDICARE

## 2024-12-01 DIAGNOSIS — R04.0 NOSEBLEED: ICD-10-CM

## 2024-12-01 DIAGNOSIS — R53.1 WEAKNESS: Primary | ICD-10-CM

## 2024-12-01 DIAGNOSIS — N18.6 STAGE 5 CHRONIC KIDNEY DISEASE ON CHRONIC DIALYSIS (HCC): ICD-10-CM

## 2024-12-01 DIAGNOSIS — Z99.2 STAGE 5 CHRONIC KIDNEY DISEASE ON CHRONIC DIALYSIS (HCC): ICD-10-CM

## 2024-12-01 LAB
ALBUMIN SERPL-MCNC: 4.4 G/DL (ref 3.2–4.8)
ALBUMIN/GLOB SERPL: 1.5 {RATIO} (ref 1–2)
ALP LIVER SERPL-CCNC: 104 U/L
ALT SERPL-CCNC: 11 U/L
ANION GAP SERPL CALC-SCNC: 13 MMOL/L (ref 0–18)
AST SERPL-CCNC: 19 U/L (ref ?–34)
BASOPHILS # BLD AUTO: 0.07 X10(3) UL (ref 0–0.2)
BASOPHILS NFR BLD AUTO: 1 %
BILIRUB SERPL-MCNC: 0.3 MG/DL (ref 0.3–1.2)
BUN BLD-MCNC: 80 MG/DL (ref 9–23)
CALCIUM BLD-MCNC: 8.7 MG/DL (ref 8.7–10.4)
CHLORIDE SERPL-SCNC: 103 MMOL/L (ref 98–112)
CHOLEST SERPL-MCNC: 169 MG/DL (ref ?–200)
CO2 SERPL-SCNC: 21 MMOL/L (ref 21–32)
CREAT BLD-MCNC: 12.46 MG/DL
EGFRCR SERPLBLD CKD-EPI 2021: 5 ML/MIN/1.73M2 (ref 60–?)
EOSINOPHIL # BLD AUTO: 0.53 X10(3) UL (ref 0–0.7)
EOSINOPHIL NFR BLD AUTO: 7.8 %
ERYTHROCYTE [DISTWIDTH] IN BLOOD BY AUTOMATED COUNT: 13.9 %
GLOBULIN PLAS-MCNC: 2.9 G/DL (ref 2–3.5)
GLUCOSE BLD-MCNC: 106 MG/DL (ref 70–99)
GLUCOSE BLD-MCNC: 110 MG/DL (ref 70–99)
GLUCOSE BLD-MCNC: 119 MG/DL (ref 70–99)
GLUCOSE BLD-MCNC: 188 MG/DL (ref 70–99)
GLUCOSE BLD-MCNC: 99 MG/DL (ref 70–99)
HCT VFR BLD AUTO: 25.3 %
HDLC SERPL-MCNC: 38 MG/DL (ref 40–59)
HGB BLD-MCNC: 8.9 G/DL
IMM GRANULOCYTES # BLD AUTO: 0.02 X10(3) UL (ref 0–1)
IMM GRANULOCYTES NFR BLD: 0.3 %
LDLC SERPL CALC-MCNC: 106 MG/DL (ref ?–100)
LYMPHOCYTES # BLD AUTO: 1.24 X10(3) UL (ref 1–4)
LYMPHOCYTES NFR BLD AUTO: 18.3 %
MCH RBC QN AUTO: 32.7 PG (ref 26–34)
MCHC RBC AUTO-ENTMCNC: 35.2 G/DL (ref 31–37)
MCV RBC AUTO: 93 FL
MONOCYTES # BLD AUTO: 0.83 X10(3) UL (ref 0.1–1)
MONOCYTES NFR BLD AUTO: 12.3 %
NEUTROPHILS # BLD AUTO: 4.08 X10 (3) UL (ref 1.5–7.7)
NEUTROPHILS # BLD AUTO: 4.08 X10(3) UL (ref 1.5–7.7)
NEUTROPHILS NFR BLD AUTO: 60.3 %
NONHDLC SERPL-MCNC: 131 MG/DL (ref ?–130)
OSMOLALITY SERPL CALC.SUM OF ELEC: 308 MOSM/KG (ref 275–295)
PLATELET # BLD AUTO: 199 10(3)UL (ref 150–450)
POTASSIUM SERPL-SCNC: 4.2 MMOL/L (ref 3.5–5.1)
PROT SERPL-MCNC: 7.3 G/DL (ref 5.7–8.2)
RBC # BLD AUTO: 2.72 X10(6)UL
SODIUM SERPL-SCNC: 137 MMOL/L (ref 136–145)
TRIGL SERPL-MCNC: 137 MG/DL (ref 30–149)
TROPONIN I SERPL HS-MCNC: 226 NG/L
TROPONIN I SERPL HS-MCNC: 228 NG/L
TROPONIN I SERPL HS-MCNC: 255 NG/L
VLDLC SERPL CALC-MCNC: 23 MG/DL (ref 0–30)
WBC # BLD AUTO: 6.8 X10(3) UL (ref 4–11)

## 2024-12-01 RX ORDER — BUPROPION HYDROCHLORIDE 150 MG/1
150 TABLET ORAL DAILY
Status: DISCONTINUED | OUTPATIENT
Start: 2024-12-01 | End: 2024-12-07

## 2024-12-01 RX ORDER — MEMANTINE HYDROCHLORIDE 5 MG/1
5 TABLET ORAL 2 TIMES DAILY
Status: DISCONTINUED | OUTPATIENT
Start: 2024-12-01 | End: 2024-12-07

## 2024-12-01 RX ORDER — NIFEDIPINE 60 MG/1
60 TABLET, EXTENDED RELEASE ORAL 2 TIMES DAILY
Status: DISCONTINUED | OUTPATIENT
Start: 2024-12-01 | End: 2024-12-03

## 2024-12-01 RX ORDER — ALBUMIN (HUMAN) 12.5 G/50ML
25 SOLUTION INTRAVENOUS
Status: ACTIVE | OUTPATIENT
Start: 2024-12-01 | End: 2024-12-03

## 2024-12-01 RX ORDER — LAMOTRIGINE 100 MG/1
100 TABLET ORAL DAILY
Status: DISCONTINUED | OUTPATIENT
Start: 2024-12-01 | End: 2024-12-07

## 2024-12-01 RX ORDER — ARIPIPRAZOLE 15 MG/1
15 TABLET ORAL DAILY
Status: DISCONTINUED | OUTPATIENT
Start: 2024-12-01 | End: 2024-12-01 | Stop reason: SDUPTHER

## 2024-12-01 RX ORDER — CARVEDILOL 12.5 MG/1
25 TABLET ORAL 2 TIMES DAILY WITH MEALS
Status: DISCONTINUED | OUTPATIENT
Start: 2024-12-01 | End: 2024-12-04

## 2024-12-01 RX ORDER — HEPARIN SODIUM 5000 [USP'U]/ML
5000 INJECTION, SOLUTION INTRAVENOUS; SUBCUTANEOUS EVERY 8 HOURS SCHEDULED
Status: DISCONTINUED | OUTPATIENT
Start: 2024-12-01 | End: 2024-12-07

## 2024-12-01 RX ORDER — ACETAMINOPHEN 325 MG/1
650 TABLET ORAL EVERY 4 HOURS PRN
Status: DISCONTINUED | OUTPATIENT
Start: 2024-12-01 | End: 2024-12-07

## 2024-12-01 RX ORDER — HYDRALAZINE HYDROCHLORIDE 25 MG/1
25 TABLET, FILM COATED ORAL EVERY 8 HOURS SCHEDULED
Status: DISCONTINUED | OUTPATIENT
Start: 2024-12-01 | End: 2024-12-03

## 2024-12-01 RX ORDER — ALBUTEROL SULFATE 90 UG/1
2 INHALANT RESPIRATORY (INHALATION) EVERY 6 HOURS PRN
Status: DISCONTINUED | OUTPATIENT
Start: 2024-12-01 | End: 2024-12-07

## 2024-12-01 RX ORDER — FENOFIBRATE 134 MG/1
134 CAPSULE ORAL NIGHTLY
Status: DISCONTINUED | OUTPATIENT
Start: 2024-12-01 | End: 2024-12-07

## 2024-12-01 RX ORDER — HYDRALAZINE HYDROCHLORIDE 50 MG/1
50 TABLET, FILM COATED ORAL EVERY 8 HOURS SCHEDULED
Status: DISCONTINUED | OUTPATIENT
Start: 2024-12-01 | End: 2024-12-01

## 2024-12-01 RX ORDER — LOSARTAN POTASSIUM 100 MG/1
100 TABLET ORAL DAILY
Status: DISCONTINUED | OUTPATIENT
Start: 2024-12-01 | End: 2024-12-03

## 2024-12-01 RX ORDER — FLUTICASONE PROPIONATE 50 MCG
1 SPRAY, SUSPENSION (ML) NASAL DAILY
Status: DISCONTINUED | OUTPATIENT
Start: 2024-12-01 | End: 2024-12-07

## 2024-12-01 RX ORDER — HYDROCODONE BITARTRATE AND ACETAMINOPHEN 5; 325 MG/1; MG/1
1 TABLET ORAL EVERY 4 HOURS PRN
Status: DISCONTINUED | OUTPATIENT
Start: 2024-12-01 | End: 2024-12-02

## 2024-12-01 RX ORDER — HEPARIN SODIUM 1000 [USP'U]/ML
1.5 INJECTION, SOLUTION INTRAVENOUS; SUBCUTANEOUS
Status: COMPLETED | OUTPATIENT
Start: 2024-12-01 | End: 2024-12-01

## 2024-12-01 RX ORDER — TAMSULOSIN HYDROCHLORIDE 0.4 MG/1
0.4 CAPSULE ORAL DAILY
Status: DISCONTINUED | OUTPATIENT
Start: 2024-12-01 | End: 2024-12-07

## 2024-12-01 RX ORDER — HYDROCODONE BITARTRATE AND ACETAMINOPHEN 5; 325 MG/1; MG/1
2 TABLET ORAL EVERY 4 HOURS PRN
Status: DISCONTINUED | OUTPATIENT
Start: 2024-12-01 | End: 2024-12-02

## 2024-12-01 RX ORDER — SEVELAMER CARBONATE 800 MG/1
800 TABLET, FILM COATED ORAL
Status: DISCONTINUED | OUTPATIENT
Start: 2024-12-01 | End: 2024-12-03

## 2024-12-01 NOTE — H&P
General Medicine H&P     Chief Complaint   Patient presents with    Nose Bleed        PCP: Adrian Small MD    History of Present Illness: Patient is a 44 year old male with PMH including but not limited to ADD, bipolar, HTN, DVT/PE, hyperlipidemia, DM, CKD on dialysis, MGUS, cervical spondylosis, and TIA in 2021 who p/t [facility] ED c weakness and missed HD.    Pt states not doing well. Feels weak, legs giving out when going to bathroom. Exhausted. Feels like cuada equina (had it in 2018 from mass in spine). MIssed HD Friday. Some nose bleed.  When asked, last night +cp. Low BPs at home 80/60s    Saw Vishal Hurt MD at 11/06/2024. Still sees pyschiatrist.     No tob/etoh/drug use, used to use cocaine.     Admitted 11/16/2024-11/19/2024 for similar, many similar prior admits.     Lives c mom    Past Medical History:    Anxiety state    Arrhythmia    Asthma (HCC)    Attention deficit hyperactivity disorder (ADHD)    Back problem    Bipolar 1 disorder (HCC)    CKD (chronic kidney disease) stage 3, GFR 30-59 ml/min ([facility])    Dr Meeks    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HCC)    Congestive heart disease (HCC)    COPD (chronic obstructive pulmonary disease) (Former[facility])    Coronary atherosclerosis    Deep vein thrombosis (HCC)    at age 17 R/T cast    Depression    Diabetes (HCC)    Dialysis patient (HCC)    Diverticulosis of large intestine    Essential hypertension    3/21 echo: severe concentric LVH with normal EF and no MR or pHTN    Extrinsic asthma, unspecified    Heart attack (HCC)    2016- angiogram- no intervention    Heart valve disease    mitral valve repair in 1994/    High blood pressure    High cholesterol    History of blood transfusion    History of mitral valve repair    Hyperlipidemia    Low back pain    tight and stiff after sweeping and mopping    LVH (left ventricular hypertrophy)    Migraines    Mixed hyperlipidemia     HDL 38 LDL 97 VLDL 57     Monoclonal gammopathy    IgG kappa     Muscle weakness     MVP (mitral valve prolapse)    Repair 1994 at [place]; echoes as recently as 3/21 show mild or trivial MR and no stenosis    Neuropathy    Osteoarthritis    hip ,knees    Pneumonia due to organism    Pulmonary embolism (HCC)    Renal disorder    Stroke (HCC)    TIA (transient ischemic attack)    Initial history of left-sided weakness and slurred speech. (+) cocaine. MRI of the brain, CT angiogram of the head and neck, and 2D echo are all unremarkable.     TMJ (dislocation of temporomandibular joint)    Troponin level elevated    Trop 60 60 47 with TIA and no CP: Lexiscan negative with EF 51    Visual impairment      Past Surgical History:   Procedure Laterality Date    Av fistula revision, open Left     Cabg      Colonoscopy N/A 03/26/2023    Procedure: COLONOSCOPY;  Surgeon: Heath Vu MD;  Location:  ENDOSCOPY    Colonoscopy N/A 12/30/2023    Procedure: COLONOSCOPY with cold snare polypectomy and forcep polypectomy;  Surgeon: Ousmane Suarez MD;  Location:  ENDOSCOPY    Colonoscopy & polypectomy  2019    Egd  2019    Duodenitis. Biopsied. EUS for weight loss was negative    Heart surgery      Hernia surgery  08/17/2022    Dr Barnes    Laminectomy,>2 sgmt,lumbar  09/06/2018    L4-L5 Decomp Discectomy ROEM L4-L5    Mitralplasty w cp bypass  1994    [facility]: Repair    Repair rotator cuff,chronic Left     torn and had a ruptured bicep    Sinus surgery        Spine surgery procedure unlisted      Valve repair  1994    mitral valve        ALL:  Allergies[1]     buPROPion ER, 150 mg, Daily  carvedilol, 25 mg, BID with meals  fenofibrate micronized, 134 mg, Nightly  FLUoxetine, 40 mg, Daily  fluticasone propionate, 1 spray, Daily  hydrALAZINE, 50 mg, Q8H MARTHA  lamoTRIgine, 100 mg, Daily  losartan, 100 mg, Daily  memantine, 5 mg, BID  NIFEdipine ER, 60 mg, BID  tamsulosin, 0.4 mg, Daily  heparin, 5,000 Units, Q8H MARTHA  ARIPiprazole, 15 mg, Daily        Social History     Tobacco Use    Smoking status: Former      Current packs/day: 0.00     Average packs/day: 1 pack/day for 27.0 years (27.0 ttl pk-yrs)     Types: Cigarettes     Start date: 1995     Quit date: 2022     Years since quittin.7     Passive exposure: Never    Smokeless tobacco: Never   Substance Use Topics    Alcohol use: No        Fam Hx  Family History   Problem Relation Age of Onset    Hypertension Father     Alcohol and Other Disorders Associated Father     Substance Abuse Father         cocaine    Dementia Father     Cancer Father         lung    Diabetes Mother     Cancer Mother         multiple myeloma    Hypertension Mother     Anxiety Maternal Aunt     Depression Maternal Aunt     Anxiety Maternal Aunt     Depression Maternal Aunt     Bipolar Disorder Maternal Aunt     Diabetes Maternal Grandmother     Hypertension Maternal Grandmother     Cancer Maternal Grandfather         stomach cancer    Diabetes Maternal Grandfather     Hypertension Maternal Grandfather     Alcohol and Other Disorders Associated Maternal Grandfather     Hypertension Paternal Grandmother     Hypertension Paternal Grandfather     Cancer Sister         uterine and ovarian    Hypertension Sister     Cancer Maternal Uncle         lung    Cancer Paternal Aunt         throat       Review of Systems  Comprehensive ROS reviewed and negative except for what's stated above. \    OBJECTIVE:  /87   Pulse 95   Temp 97.4 °F (36.3 °C) (Oral)   Resp 19   Ht 6' 2\" (1.88 m)   Wt 240 lb (108.9 kg)   SpO2 94%   BMI 30.81 kg/m²     General:  Alert, no distress, appears stated age.    Head:  Normocephalic, without obvious abnormality, atraumatic.   Eyes:  Sclera anicteric, EOMs intact.    Nose: Nares normal,  Mucosa normal    Throat: Lips normal   Neck: Supple, symmetrical, trachea midline   Lungs:   Clear to auscultation bilaterally. Normal effort   Chest wall:  No tenderness or deformity   Heart:  Regular rate and rhythm, S1, S2 normal, no murmur, rub or gallop appreciated    Abdomen:   Soft, NT/ND, Bowel sounds normal. No masses,  No organomegaly.    Extremities/MSK: Extremities normal/normal movement, atraumatic, no cyanosis  or edema.   Skin: Skin color, texture, turgor normal. No rashes or lesions.    Neurologic: Moving all extremities spontaneously, no focal deficit appreciated          LABS:   Lab Results   Component Value Date    WBC 6.8 2024    HGB 8.9 2024    HCT 25.3 2024    .0 2024    CREATSERUM 12.46 2024    BUN 80 2024     2024    K 4.2 2024     2024    CO2 21.0 2024     2024    CA 8.7 2024    ALB 4.4 2024    ALKPHO 104 2024    BILT 0.3 2024    TP 7.3 2024    AST 19 2024    ALT 11 2024    PGLU 119 2024       Radiology: CARD ECHO 2D DOPPLER (CPT=93306)    Result Date: 2024  Transthoracic Echocardiogram Name:Godwin Fonseca Date: 2024 :  1980 Ht:  (74in)  BP: 138 / 77 MRN:  [id number]    Age:  44years    Wt:  (240lb) HR: 84bpm Loc:  [facility]       Gndr: M          BSA: 2.35m^2 Sonographer: Evelyne GIBBS Ordering:    Yrn Grewal Consulting:  Samaria Nava Referring:   Jagdeep Joseph ---------------------------------------------------------------------------- History/Indications:   Chest pain.  Prior Mitral Repair. Blood tests:    Troponin elevated. ---------------------------------------------------------------------------- Procedure information:  A transthoracic complete 2D study was performed. Additional evaluation included M-mode, complete spectral Doppler, and color Doppler.  Patient status:  Inpatient.  Location:  Bedside.    Comparison was made to the study of 03/10/2024.    This was a routine study. Transthoracic echocardiography for ventricular function evaluation and assessment of valvular function. Image quality was adequate. ECG rhythm:   Normal sinus  ---------------------------------------------------------------------------- Conclusions: 1. Left ventricle: The cavity size was normal. Wall thickness was moderately    increased. Systolic function was normal. The estimated ejection fraction    was 60-65%, by visual assessment. No diagnostic evidence for regional    wall motion abnormalities. Features are consistent with a pseudonormal    left ventricular filling pattern, with concomitant abnormal relaxation    and increased filling pressure - grade 2 diastolic dysfunction. 2. Left atrium: The atrium was markedly dilated. The left atrial volume was    markedly increased. 3. Mitral valve: There was mild to moderate regurgitation, with multiple    jets. The mean diastolic gradient was 3mm Hg at a heart rate of 78 bpm. * ---------------------------------------------------------------------------- * Findings: Left ventricle:  The cavity size was normal. Wall thickness was moderately increased. Systolic function was normal. The estimated ejection fraction was 60-65%, by visual assessment. No diagnostic evidence for regional wall motion abnormalities. Features are consistent with a pseudonormal left ventricular filling pattern, with concomitant abnormal relaxation and increased filling pressure - grade 2 diastolic dysfunction. Left atrium:  The atrium was markedly dilated. The left atrial volume was markedly increased. Right ventricle:  The cavity size was normal. Systolic function was normal. Right atrium:  The atrium was normal in size. Mitral valve:  An annular ring was present. The annulus was mildly calcified. Leaflet separation was normal.  Doppler:  Transvalvular velocity was within the normal range. There was no evidence for stenosis. There was mild to moderate regurgitation, with multiple jets.    The valve area by pressure half-time was 2.18cm^2. The valve area index by pressure half-time was 0.93cm^2/m^2.    The mean diastolic gradient was 3mm Hg at a heart  rate of 78 bpm. Aortic valve:  The valve was structurally normal. The valve was trileaflet. The leaflets were normal thickness. Cusp separation was normal.  Doppler: Transvalvular velocity was within the normal range. There was no evidence for stenosis. There was trivial regurgitation. Tricuspid valve:  The valve is structurally normal. Leaflet separation was normal.  Doppler:  Transvalvular velocity was within the normal range. There was no evidence for stenosis. There was trivial regurgitation. Pulmonic valve:   The valve is structurally normal. Cusp separation was normal.  Doppler:  Transvalvular velocity was within the normal range. There was no evidence for stenosis. There was trivial regurgitation. Pericardium:   There was no pericardial effusion. Aorta: Aortic root: The aortic root was normal. Ascending aorta: The ascending aorta was normal. Pulmonary arteries: Systolic pressure could not be accurately estimated. Systemic veins:  Central venous respirophasic diameter changes are in the normal range (>50%). Inferior vena cava: The IVC was normal-sized. ---------------------------------------------------------------------------- Measurements  Left ventricle       Value          Ref       03/10/2024  IVS thickness,   (H) 1.5   cm       0.6 - 1.0 1.8  ED, PLAX  LV ID, ED, PLAX      4.8   cm       4.2 - 5.8 4.3  LV ID, ES, PLAX      3.0   cm       2.5 - 4.0 2.9  LV PW thickness, (H) 1.5   cm       0.6 - 1.0 1.5  ED, PLAX  IVS/LV PW ratio,     0.98           --------- 1.20  ED, PLAX  LV PW/LV ID          0.31           --------- 0.36  ratio, ED, PLAX  LV ejection          67    %        52 - 72   61  fraction  LV e', lateral   (L) 7.3   cm/sec   >=10.0    ----------  LV E/e', lateral (H) 17             <=13      ----------  LV e', medial    (L) 6.0   cm/sec   >=7.0     ----------  LV E/e', medial      20             --------- ----------  LV e', average       6.6   cm/sec   --------- ----------  LV E/e', average (H)  18             <=14      ----------  LVOT                 Value          Ref       03/10/2024  LVOT ID              2.6   cm       --------- ----------  Aortic root          Value          Ref       03/10/2024  Aortic root ID,      3.5   cm       2.9 - 4.4 ----------  ED  Ascending aorta      Value          Ref       03/10/2024  Ascending aorta      3.2   cm       2.2 - 3.8 ----------  ID, A-P, ED  Left atrium          Value          Ref       03/10/2024  LA ID, A-P, ES   (H) 5.0   cm       3.0 - 4.0 ----------  LA volume, S     (H) 197   ml       18 - 58   ----------  LA volume/bsa, S (H) 84    ml/m^2   16 - 34   ----------  LA volume, ES,   (H) 190   ml       18 - 58   ----------  1-p A4C  LA volume, ES,   (H) 200   ml       18 - 58   ----------  1-p A2C  LA volume, ES,       212   ml       --------- ----------  A/L  LA volume/bsa,   (H) 90    ml/m^2   16 - 34   ----------  ES, A/L  LA/aortic root       1.43           --------- ----------  ratio  Mitral valve         Value          Ref       03/10/2024  Mitral E-wave        1.2   m/sec    --------- ----------  peak velocity  Mitral A-wave        0.97  m/sec    --------- ----------  peak velocity  Mitral pressure      101   ms       --------- ----------  half-time  Mitral mean          3     mm Hg    --------- 2  gradient, D  Mitral peak          6     mm Hg    --------- 4  gradient, D  Mitral E/A           1.2            --------- ----------  ratio, peak  Mitral valve         2.18  cm^2     --------- ----------  area, PHT, DP  Mitral valve         0.93  cm^2/m^2 --------- ----------  area/bsa, PHT,  DP  Mitral maximal       651   cm/sec   --------- ----------  regurg velocity,  PISA  Mitral regurg        33.1  cm       --------- 24.8  VTI, PISA  Systemic veins       Value          Ref       03/10/2024  Estimated CVP        3     mm Hg    --------- ----------  Right ventricle      Value          Ref       03/10/2024  TAPSE, 2D            2.20  cm       >=1.70     ---------- Legend: (L)  and  (H)  fahad values outside specified reference range. ---------------------------------------------------------------------------- Prepared and electronically signed by Yesenia Rubin MD 11/18/2024 08:21     CT ABDOMEN+PELVIS(CPT=74176)    Result Date: 11/16/2024  PROCEDURE:  CT ABDOMEN+PELVIS (CPT=74176)  COMPARISON:  [facility], CT, CT ABDOMEN+PELVIS(CONTRAST ONLY)(CPT=74177), 10/06/2024, 10:09 PM.  INDICATIONS:  abd pain - rectal bleeding - chest pain  TECHNIQUE:  Unenhanced multislice CT scanning was performed from the dome of the diaphragm to the pubic symphysis.  Dose reduction techniques were used. Dose information is transmitted to the ACR (American College of Radiology) NRDR (National Radiology Data Registry) which includes the Dose Index Registry.  PATIENT STATED HISTORY: (As transcribed by Technologist)  Abdominal pain S/P dialysis yesterday. 10/10 LLQ burning and stabbing pain that radiates to chest. Rectal bleeding noted at home today    FINDINGS:  LIVER:  No enlargement, atrophy, abnormal density, or significant focal lesion.  BILIARY:  No visible dilatation or calcification.  PANCREAS:  No lesion, fluid collection, ductal dilatation, or atrophy.  SPLEEN:  No enlargement or focal lesion.  KIDNEYS:  No mass, obstruction, or calcification.  ADRENALS:  No mass or enlargement.  AORTA/VASCULAR:    Unremarkable as seen on non-contrast imaging. RETROPERITONEUM:  No mass or adenopathy.  BOWEL/MESENTERY:  No dilated bowel or wall thickening.  Colonic diverticulosis, without diverticulitis.  Normal appendix. ABDOMINAL WALL:  No mass or hernia.  URINARY BLADDER:  Circumferential urinary bladder wall thickening. PELVIC NODES:  No adenopathy.  PELVIC ORGANS:  No visible mass.  Pelvic organs appropriate for patient age.  BONES:  No bony lesion or fracture.  LUNG BASES:  Right basilar atelectasis, similar to prior. OTHER:  Negative.             CONCLUSION:  1. Circumferential urinary  bladder wall thickening, correlate for cystitis. 2. Colonic diverticulosis.   LOCATION:  [id number]   Dictated by (CST): Duy De La Rosa MD on 11/16/2024 at 2:47 PM     Finalized by (CST): Duy De La Rosa MD on 11/16/2024 at 2:50 PM       MRI BRAIN (CPT=70551)    Result Date: 11/6/2024  PROCEDURE:  MRI BRAIN (CPT=70551)  COMPARISON:  [facility] , MR, MRI BRAIN (CPT=70551), 8/24/2023, 9:12 PM.  [facility] , MR, MRI SPINE CERVICAL (CPT=72141), 10/19/2024, 8:25 PM.  [facility], CT, CT BRAIN OR HEAD (60162), 11/06/2024, 1:13 PM.  INDICATIONS:  slurred speech.  History of TIA.  Shaking and stuttering.  TECHNIQUE:  MRI of the brain was performed with multi-planar T1, T2-weighted images with FLAIR sequences and diffusion weighted images without infusion.  PATIENT STATED HISTORY: (As transcribed by Technologist)  Patient has history of TIA and states he has been having headache and neck pain with uncontrollable shaking and stuttering which has been worse for the past 2 days    FINDINGS:   The ventricles and sulci are normal in size for the patient's age.  No mass-effect, midline shift, hydrocephalus, herniation, extra-axial fluid collections, or signs of acute territorial infarct, or brain tumor.  No abnormality of midline structures. No sign of restricted diffusion. No pathologic gradient susceptibility pattern.  Flow voids are present within the internal carotid and basilar arteries. No sign of acute fluid in the paranasal sinuses. Postinflammatory changes are present within the paranasal sinuses, without findings indicating acute sinusitis.            CONCLUSION:  No abnormalities are seen.   LOCATION:  [id number]   Dictated by (CST): Joe London MD on 11/06/2024 at 3:44 PM     Finalized by (CST): Joe London MD on 11/06/2024 at 3:49 PM       CT BRAIN OR HEAD (CPT=70450)    Result Date: 11/6/2024  PROCEDURE:  CT BRAIN OR HEAD (62412)  COMPARISON:  [facility], CT, CT BRAIN OR HEAD ([id number]), 3/16/2024, 1:45 AM.  INDICATIONS:  sob,  dizziness, slurring  TECHNIQUE:  Noncontrast CT scanning is performed through the brain. Dose reduction techniques were used. Dose information is transmitted to the ACR (American College of Radiology) NRDR (National Radiology Data Registry) which includes the Dose Index Registry.  PATIENT STATED HISTORY: (As transcribed by Technologist)  Patient states he was at his neurologist and has had slurring of speech since Monday and got worse today with tremors.    FINDINGS:  Motion artifact slightly limits assessment. VENTRICLES/SULCI:  Ventricles and sulci are normal in size.  INTRACRANIAL:  There are no abnormal extraaxial fluid collections.  There is no midline shift.  There are no intraparenchymal brain abnormalities.  There is nothing specific for acute infarct.  There is no hemorrhage or mass lesion.  SINUSES:           No sign of acute sinusitis.  MASTOIDS:          No sign of acute inflammation. SKULL:             No evidence for fracture or osseous abnormality. OTHER:             None.            CONCLUSION:  Motion artifact slightly limits assessment.  Within the limitations of the exam there is no significant midline shift or mass effect.  No acute intracranial hemorrhage.  If there is persistent clinical concern then consider MRI.     LOCATION:  [facility]   Dictated by (CST): Smith Randle MD on 11/06/2024 at 1:25 PM     Finalized by (CST): Smith Randle MD on 11/06/2024 at 1:29 PM       XR CHEST AP PORTABLE  (CPT=71045)    Result Date: 11/6/2024  PROCEDURE:  XR CHEST AP PORTABLE  (CPT=71045)  TECHNIQUE:  AP chest radiograph was obtained.  COMPARISON:  [facility], XR, XR CHEST AP PORTABLE  (CPT=71045), 10/17/2024, 7:43 AM.  INDICATIONS:  sob, dizziness, slurring  PATIENT STATED HISTORY: (As transcribed by Technologist)  Patient came in the [facility] today due to shortness of breath, dizziness and slurring of his words.  Patient offered no additional history.    FINDINGS:  Patient rotated to right.  Tunneled right internal  jugular hemodialysis catheter remains in place.  Normal heart size and pulmonary vascularity.  Decreased right basilar opacity.  No new pulmonary opacity.  Mild blunting of right costophrenic angle.            CONCLUSION:  Decreased right basilar pneumonia.  Small right pleural effusion.   LOCATION:  [id number]      Dictated by (CST): Ricki Zaragoza MD on 11/06/2024 at 1:01 PM     Finalized by (CST): Ricki Zaragoza MD on 11/06/2024 at 1:02 PM            ASSESSMENT / PLAN:    44 year old male with PMH including but not limited to ADD, bipolar, HTN, DVT/PE, hyperlipidemia, DM, CKD on dialysis, MGUS, cervical spondylosis, and TIA in 2021 who p/t [facility] ED c weakness and missed HD.    Weakness  - likely 2/2 missed HD, stressed need to go to HD even if weak as weill only make him weaker  - PT/OT  - if further worsening or sxs, can consider neuro c/s  - has seen as o/p Vishal Hurt MD on 11/06/2024.   -CT head and MRI brain negative 11/16    ESRD  - per renal, plan HD today    Elevated trop  - has had before, chronic   - apprec cards recs    HTN  - decrease hydralazine per cards   - pt reports Low BPs at home 80/60s    Hyperphosphatemia:  -- sevelamer 2400 TID AC    Non-obstructive minimal CAD on cath 4/24  H/o severe MR s/p MVR, redo complex robotically assisted MVR 2022  Chronic HFpEF  - echo EF 60-65%, G2DD  - h/o cath 2023 with mild irregularities    Anemia  Nose bleeds  - 2/2 ESRD, Hgb 8.9, follow    Finding of possible \"cystitis\" on imaging  - seen on previous CT as well  -Patient has had cystoscopy that looked okay in the past per his report.  He also has been treated for possible prostatitis.  He does seem to have more chronic discomfort in the suprapubic area.     LLQ lump- lipoma versus other subcutaneous nodule (?could it be from previous heparin injection site)  -No findings on imaging to suggest a deeper process.    - o/p f/u       # Cervical radiculopathy  -Pain control as needed  -Follow-up with spine surgeon         # Bipolar disorder  -Continue Abilify, bupropion, Lamictal   - follows c pyschiatrist, reports dose adjustment in meds    # Depression  -Continue fluoxetine      #COPD, chronic  -Continue home inhalers    Dispo: CTU  - Lives c mom      Outpatient records or previous hospital records reviewed. [facility] hospitalist to continue to follow patient while in house. A total of 75 minutes taken.  Greater than 50% face to face encounter.  D/w RN     Yuriy Forrest MD  [facility] Hospitalist  Pager: [phone]  12/1/2024  10:42 AM               [1]   Allergies  Allergen Reactions    Hydrochlorothiazide RASH and HIVES

## 2024-12-01 NOTE — PLAN OF CARE
Assumed care 0400, 11/30, NOC. Admission complete, consults following, VSS, meds ordered, needs met

## 2024-12-01 NOTE — CONSULTS
[facility]   part of [facility]    Report of Consultation    Godwin Fonseca Patient Status:  Observation    1980 MRN [id number]   Location [facility] 7NE-A Attending Yuriy Forrest MD   Hosp Day # 0 PCP Adrian Small MD     Date of consult: 2024    REASON FOR CONSULT:     ESRD    HISTORY OF PRESENT ILLNESS:     Godwin Fonseca is a a(n) 44 year old male with history of ESRD on iHD MWF via RIJ CVC, LUE AVF, hyperaldosteronism, DM, bipolar disorder, recurrent GI bleed, pneumonia recently presented with weakness and nose bleeds. Missed HD Friday due to weakness. No n/v/d. No other complaints     REVIEW OF SYSTEMS:     Please see HPI for pertinent positives. 10 point review of systems otherwise reviewed and negative.     HISTORY:     Past Medical History:    Anxiety state    Arrhythmia    Asthma (HCC)    Attention deficit hyperactivity disorder (ADHD)    Back problem    Bipolar 1 disorder (HCC)    CKD (chronic kidney disease) stage 3, GFR 30-59 ml/min ([facility])    Dr Meeks    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HCC)    Congestive heart disease (HCC)    COPD (chronic obstructive pulmonary disease) (HCC)    Coronary atherosclerosis    Deep vein thrombosis (HCC)    at age 17 R/T cast    Depression    Diabetes (HCC)    Dialysis patient (HCC)    Diverticulosis of large intestine    Essential hypertension    3/21 echo: severe concentric LVH with normal EF and no MR or pHTN    Extrinsic asthma, unspecified    Heart attack (HCC)    - angiogram- no intervention    Heart valve disease    mitral valve repair in /    High blood pressure    High cholesterol    History of blood transfusion    History of mitral valve repair    Hyperlipidemia    Low back pain    tight and stiff after sweeping and mopping    LVH (left ventricular hypertrophy)    Migraines    Mixed hyperlipidemia     HDL 38 LDL 97 VLDL 57     Monoclonal gammopathy    IgG kappa     Muscle weakness    MVP (mitral valve prolapse)     Repair 1994 at [facility]; echoes as recently as 3/21 show mild or trivial MR and no stenosis    Neuropathy    Osteoarthritis    hip ,knees    Pneumonia due to organism    Pulmonary embolism (HCC)    Renal disorder    Stroke (HCC)    TIA (transient ischemic attack)    Initial history of left-sided weakness and slurred speech. (+) cocaine. MRI of the brain, CT angiogram of the head and neck, and 2D echo are all unremarkable.     TMJ (dislocation of temporomandibular joint)    Troponin level elevated    Trop 60 60 47 with TIA and no CP: Lexiscan negative with EF 51    Visual impairment     Past Surgical History:   Procedure Laterality Date    Av fistula revision, open Left     Cabg      Colonoscopy N/A 03/26/2023    Procedure: COLONOSCOPY;  Surgeon: Heath Vu MD;  Location:  ENDOSCOPY    Colonoscopy N/A 12/30/2023    Procedure: COLONOSCOPY with cold snare polypectomy and forcep polypectomy;  Surgeon: Ousmane Suarez MD;  Location:  ENDOSCOPY    Colonoscopy & polypectomy  2019    Egd  2019    Duodenitis. Biopsied. EUS for weight loss was negative    Heart surgery      Hernia surgery  08/17/2022    Dr Barnes    Laminectomy,>2 sgmt,lumbar  09/06/2018    L4-L5 Decomp Discectomy ROEM L4-L5    Mitralplasty w cp bypass  1994    [facility]: Repair    Repair rotator cuff,chronic Left     torn and had a ruptured bicep    Sinus surgery        Spine surgery procedure unlisted      Valve repair  1994    mitral valve     Family History   Problem Relation Age of Onset    Hypertension Father     Alcohol and Other Disorders Associated Father     Substance Abuse Father         cocaine    Dementia Father     Cancer Father         lung    Diabetes Mother     Cancer Mother         multiple myeloma    Hypertension Mother     Anxiety Maternal Aunt     Depression Maternal Aunt     Anxiety Maternal Aunt     Depression Maternal Aunt     Bipolar Disorder Maternal Aunt     Diabetes Maternal Grandmother     Hypertension Maternal Grandmother      Cancer Maternal Grandfather         stomach cancer    Diabetes Maternal Grandfather     Hypertension Maternal Grandfather     Alcohol and Other Disorders Associated Maternal Grandfather     Hypertension Paternal Grandmother     Hypertension Paternal Grandfather     Cancer Sister         uterine and ovarian    Hypertension Sister     Cancer Maternal Uncle         lung    Cancer Paternal Aunt         throat      reports that he quit smoking about 2 years ago. His smoking use included cigarettes. He started smoking about 29 years ago. He has a 27 pack-year smoking history. He has never been exposed to tobacco smoke. He has never used smokeless tobacco. He reports that he does not drink alcohol and does not use drugs.    ALLERGIES:     Allergies[1]    MEDICATIONS:       Current Facility-Administered Medications:     albuterol (Ventolin HFA) 108 (90 Base) MCG/ACT inhaler 2 puff, 2 puff, Inhalation, Q6H PRN    buPROPion ER (Wellbutrin XL) 24 hr tab 150 mg, 150 mg, Oral, Daily    carvedilol (Coreg) tab 25 mg, 25 mg, Oral, BID with meals    fenofibrate micronized (Lofibra) cap 134 mg, 134 mg, Oral, Nightly    FLUoxetine (PROzac) cap 40 mg, 40 mg, Oral, Daily    fluticasone propionate (Flonase) 50 MCG/ACT nasal suspension 1 spray, 1 spray, Each Nare, Daily    hydrALAZINE (Apresoline) tab 50 mg, 50 mg, Oral, Q8H MARTHA    lamoTRIgine (LaMICtal) tab 100 mg, 100 mg, Oral, Daily    losartan (Cozaar) tab 100 mg, 100 mg, Oral, Daily    memantine (Namenda) tab 5 mg, 5 mg, Oral, BID    NIFEdipine ER (Procardia-XL) 24 hr tab 60 mg, 60 mg, Oral, BID    tamsulosin (Flomax) cap 0.4 mg, 0.4 mg, Oral, Daily    heparin (Porcine) 5000 UNIT/ML injection 5,000 Units, 5,000 Units, Subcutaneous, Q8H MARTHA    acetaminophen (Tylenol) tab 650 mg, 650 mg, Oral, Q4H PRN **OR** HYDROcodone-acetaminophen (Norco) 5-325 MG per tab 1 tablet, 1 tablet, Oral, Q4H PRN **OR** HYDROcodone-acetaminophen (Norco) 5-325 MG per tab 2 tablet, 2 tablet, Oral, Q4H PRN     ARIPiprazole (Abilify) tab 15 mg, 15 mg, Oral, Daily    sodium chloride 0.9 % IV bolus 100 mL, 100 mL, Intravenous, Q30 Min PRN **AND** albumin human (Albumin) 25% injection 25 g, 25 g, Intravenous, PRN Dialysis    heparin (Porcine) 1000 UNIT/ML injection 1,500 Units, 1.5 mL, Intracatheter, Once  No current outpatient medications on file.         PHYSICAL EXAM:     Vital Signs: /87   Pulse 95   Temp 97.4 °F (36.3 °C) (Oral)   Resp 19   Ht 6' 2\" (1.88 m)   Wt 240 lb (108.9 kg)   SpO2 94%   BMI 30.81 kg/m²   Temp (24hrs), Av.5 °F (36.4 °C), Min:97.4 °F (36.3 °C), Max:97.6 °F (36.4 °C)     No intake or output data in the 24 hours ending 24 1105  Wt Readings from Last 3 Encounters:   24 240 lb (108.9 kg)   24 245 lb 9.5 oz (111.4 kg)   24 240 lb (108.9 kg)       General: NAD  HEENT: NCAT, MMM, EOMI  Neck: Supple   Cardiac: Regular rate and rhythm   Lungs: CTAB   Abdomen: Soft, non-tender, non-distended   : No CVA tenderness  Extremities: no leg edema  Neurologic/Psych: mentating well, no asterixis  Skin: No rashes    LABORATORY DATA:       Lab Results   Component Value Date     (H) 2024    BUN 80 (H) 2024    BUNCREA 13.0 2022    CREATSERUM 12.46 (H) 2024    ANIONGAP 13 2024    GFR 59 (L) 2018    GFRNAA 30 (L) 2022    GFRAA 35 (L) 2022    CA 8.7 2024    OSMOCALC 308 (H) 2024    ALKPHO 104 2024    AST 19 2024    ALT 11 2024    BILT 0.3 2024    TP 7.3 2024    ALB 4.4 2024    GLOBULIN 2.9 2024     2024    K 4.2 2024     2024    CO2 21.0 2024     Lab Results   Component Value Date    WBC 6.8 2024    RBC 2.72 (L) 2024    HGB 8.9 (L) 2024    HCT 25.3 (L) 2024    .0 2024    MPV 11.5 2012    MCV 93.0 2024    MCH 32.7 2024    MCHC 35.2 2024    RDW 13.9 2024    NEPRELIM 4.08  12/01/2024    NEPERCENT 60.3 12/01/2024    LYPERCENT 18.3 12/01/2024    MOPERCENT 12.3 12/01/2024    EOPERCENT 7.8 12/01/2024    BAPERCENT 1.0 12/01/2024    NE 4.08 12/01/2024    LYMABS 1.24 12/01/2024    MOABSO 0.83 12/01/2024    EOABSO 0.53 12/01/2024    BAABSO 0.07 12/01/2024     Lab Results   Component Value Date    MALBP 167.00 05/24/2023    CREUR 142.00 05/24/2023    CREUR 142.00 05/24/2023     Lab Results   Component Value Date    COLORUR Light-Yellow 11/17/2024    CLARITY Clear 11/17/2024    SPECGRAVITY 1.013 11/17/2024    GLUUR Normal 11/17/2024    BILUR Negative 11/17/2024    KETUR Negative 11/17/2024    BLOODURINE Negative 11/17/2024    PHURINE 8.5 (H) 11/17/2024    PROUR 100 (A) 11/17/2024    UROBILINOGEN Normal 11/17/2024    NITRITE Negative 11/17/2024    LEUUR Negative 11/17/2024    WBCUR 1-5 11/17/2024    RBCUR 0-2 11/17/2024    EPIUR Few (A) 11/17/2024    BACUR Rare (A) 11/17/2024    CAOXUR Occasional (A) 04/20/2018    HYLUR Present (A) 05/14/2019         IMAGING:     All imaging studies personally reviewed.    No results found.      ASSESSMENT/PLAN:   Godwin Fonseca is a a(n) 44 year old male with history of ESRD on iHD MWF via RIJ CVC, LUE AVF, hyperaldosteronism, DM, bipolar disorder, recurrent GI bleed, pneumonia recently presented with weakness and nose bleeds    ESRD:  -- will plan for HD today as he missed Friday. Will use catheter here (has AVF but unclear what size needles they are using as outpt). HD 3K, UF 3-4L, HD directly managed  -- continue MWF HD schedule  -- avoid class 1 gadolinium agents     Anemia:  -- epo with HD if BP controlled     Hyperphosphatemia:  -- sevelamer 2400 TID AC  -- low phos diet - decrease nuts     HTN:  -- continue home meds      Thank you for allowing me to participate in the care of your patient. Please do not hesitate to contact me with concerns or questions.    Samaria Nava MD  [facility]    12/1/2024  11:05 AM         [1]    Allergies  Allergen Reactions    Hydrochlorothiazide RASH and HIVES

## 2024-12-01 NOTE — ED PROVIDER NOTES
Patient Seen in: [facility] In [place]      History     Chief Complaint   Patient presents with    Nose Bleed     Stated Complaint: nosebleed    Subjective:   HPI      Patient is a 44-year-old male with multiple chronic medical problems including end-stage renal disease on dialysis.  Says he was last dialyzed Wednesday.  Supposed be dialyzed yesterday though he says he was too weak to go.  He said intermittent nosebleeds this week.  He is not currently bleeding.  Says he feels lightheaded weak.  His blood pressure has been low.  Says he developed lower back pain that radiates down his legs.  Makes it hard for him to stand for long periods of time.  No traumas or falls.  Took an Uber to the ER.  No other specific complaints.  Patient is otherwise at his medical baseline.    Objective:     Past Medical History:    Anxiety state    Arrhythmia    Asthma ([facility])    Attention deficit hyperactivity disorder (ADHD)    Back problem    Bipolar 1 disorder ([facility])    CKD (chronic kidney disease) stage 3, GFR 30-59 ml/min ([facility])    Dr Meeks    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[facility])    Congestive heart disease ([facility])    COPD (chronic obstructive pulmonary disease) ([facility])    Coronary atherosclerosis    Deep vein thrombosis ([facility])    at age 17 R/T cast    Depression    Diabetes ([facility])    Dialysis patient ([facility])    Diverticulosis of large intestine    Essential hypertension    3/21 echo: severe concentric LVH with normal EF and no MR or pHTN    Extrinsic asthma, unspecified    Heart attack ([facility])    2016- angiogram- no intervention    Heart valve disease    mitral valve repair in 1994/    High blood pressure    High cholesterol    History of blood transfusion    History of mitral valve repair    Hyperlipidemia    Low back pain    tight and stiff after sweeping and mopping    LVH (left ventricular hypertrophy)    Migraines    Mixed hyperlipidemia     HDL 38 LDL 97 VLDL 57     Monoclonal gammopathy    IgG kappa      Muscle weakness    MVP (mitral valve prolapse)    Repair  at [facility]; echoes as recently as 3/21 show mild or trivial MR and no stenosis    Neuropathy    Osteoarthritis    hip ,knees    Pneumonia due to organism    Pulmonary embolism (HCC)    Renal disorder    Stroke (HCC)    TIA (transient ischemic attack)    Initial history of left-sided weakness and slurred speech. (+) cocaine. MRI of the brain, CT angiogram of the head and neck, and 2D echo are all unremarkable.     TMJ (dislocation of temporomandibular joint)    Troponin level elevated    Trop 60 60 47 with TIA and no CP: Lexiscan negative with EF 51    Visual impairment              Past Surgical History:   Procedure Laterality Date    Av fistula revision, open Left     Cabg      Colonoscopy N/A 2023    Procedure: COLONOSCOPY;  Surgeon: Heath Vu MD;  Location:  ENDOSCOPY    Colonoscopy N/A 2023    Procedure: COLONOSCOPY with cold snare polypectomy and forcep polypectomy;  Surgeon: Ousmane Suarez MD;  Location:  ENDOSCOPY    Colonoscopy & polypectomy  2019    Egd  2019    Duodenitis. Biopsied. EUS for weight loss was negative    Heart surgery      Hernia surgery  2022    Dr Barnes    Laminectomy,>2 sgmt,lumbar  2018    L4-L5 Decomp Discectomy ROEM L4-L5    Mitralplasty w cp bypass      [facility]: Repair    Repair rotator cuff,chronic Left     torn and had a ruptured bicep    Sinus surgery        Spine surgery procedure unlisted      Valve repair      mitral valve                Social History     Socioeconomic History    Marital status:    Tobacco Use    Smoking status: Former     Current packs/day: 0.00     Average packs/day: 1 pack/day for 27.0 years (27.0 ttl pk-yrs)     Types: Cigarettes     Start date: 1995     Quit date: 2022     Years since quittin.7     Passive exposure: Never    Smokeless tobacco: Never   Vaping Use    Vaping status: Never Used   Substance and Sexual Activity    Alcohol  use: No    Drug use: No     Social Drivers of Health     Financial Resource Strain: Medium Risk (5/7/2024)    Received from [facility]    Overall Financial Resource Strain (CARDIA)     Difficulty of Paying Living Expenses: Somewhat hard   Food Insecurity: No Food Insecurity (11/16/2024)    Food Insecurity     Food Insecurity: Never true   Transportation Needs: No Transportation Needs (11/16/2024)    Transportation Needs     Lack of Transportation: No   Physical Activity: Inactive (5/7/2024)    Received from [facility]    Exercise Vital Sign     Days of Exercise per Week: 0 days     Minutes of Exercise per Session: 0 min   Stress: Stress Concern Present (5/7/2024)    Received from [facility]    [employer] - Occupational Stress Questionnaire     Feeling of Stress : Rather much   Social Connections: Unknown (5/7/2024)    Received from [facility]    Social Connection and Isolation Panel [NHANES]     Frequency of Communication with Friends and Family: More than three times a week     Frequency of Social Gatherings with Friends and Family: More than three times a week     Attends [employer] Services: Never     Active Member of Clubs or Organizations: No     Attends Club or Organization Meetings: Never     Marital Status: Patient unable to answer   Housing Stability: Low Risk  (11/16/2024)    Housing Stability     Housing Instability: No                  Physical Exam     ED Triage Vitals [12/01/24 0148]   /88   Pulse 94   Resp 19   Temp 97.6 °F (36.4 °C)   Temp src Oral   SpO2 93 %   O2 Device None (Room air)       Current Vitals:   Vital Signs  BP: (!) 124/100  Pulse: 99  Resp: 20  Temp: 97.6 °F (36.4 °C)  Temp src: Oral    Oxygen Therapy  SpO2: 94 %  O2 Device: None (Room air)        Physical Exam  General: Patient is a chronically ill 44-year-old male  HEENT: Normal cephalic atraumatic.  Nonicteric sclera.  Some dried blood in the left nare.  No  active bleeding.  Moist mucous membranes.  No meningismus.  No adenopathy  Lungs: No tachypnea.  Lungs clear to auscultation bilaterally without rales/rhonchi.  Equal breath sounds bilaterally  Cardiac: No tachycardia.  No murmurs.  Regular rate and rhythm.  Abdomen: Soft and nontender throughout.  No rebound or guarding  Extremities: No clubbing/cyanosis/edema.  Skin: No rashes, no pallor  Neuro: Awake oriented ×3.  Nonfocal.  Good strength throughout    ED Course     Labs Reviewed   COMP METABOLIC PANEL (14) - Abnormal; Notable for the following components:       Result Value    Glucose 106 (*)     BUN 80 (*)     Creatinine 12.46 (*)     Calculated Osmolality 308 (*)     eGFR-Cr 5 (*)     All other components within normal limits   CBC WITH DIFFERENTIAL WITH PLATELET - Abnormal; Notable for the following components:    RBC 2.72 (*)     HGB 8.9 (*)     HCT 25.3 (*)     All other components within normal limits   TROPONIN I HIGH SENSITIVITY - Abnormal; Notable for the following components:    Troponin I (High Sensitivity) 228 (*)     All other components within normal limits   LIPID PANEL            Lab work reviewed.  BUN 80 with a creatinine of 12.46.  Potassium is 4.2.  Troponin elevated though chronically so.  Hemoglobin 8.9, slightly decreased from baseline.       MDM      Patient is here with multiple complaints.  His chief complaint is intermittent nosebleeds and weakness.  His blood pressure has been low.  No active bleeding on arrival here.  Says he feels lightheaded and weak.  He did miss dialysis.  Says he has back pain.  He is able to stand and walk chair to the bed.  Discussed with patient, will check blood work, EKG.  Will reassess.    Patient still complains of profound weakness.  Says he cannot get up and walk.  Patient with frequent hospitalizations.  Does have a remote history of cauda equina syndrome.  Does complain of leg weakness though was able to ambulate here.  Patient does not feel safe or  comfortable with outpatient dialysis.  Will admit for dialysis.  If weakness does not resolve, will consider MRI.  Monitor hemoglobin.    Medical Decision Making      Disposition and Plan     Clinical Impression:  1. Weakness    2. Stage 5 chronic kidney disease on chronic dialysis (HCC)    3. Nosebleed         Disposition:  There is no disposition on file for this visit.  There is no disposition time on file for this visit.    Follow-up:  No follow-up provider specified.        Medications Prescribed:  Current Discharge Medication List              Supplementary Documentation:

## 2024-12-01 NOTE — ED QUICK NOTES
Orders for admission, patient is aware of plan and ready to go upstairs. Any questions, please call ED RN vicenta at extension [phone].     Patient Covid vaccination status: Fully vaccinated     COVID Test Ordered in ED: None    COVID Suspicion at Admission: N/A    Running Infusions:  None    Mental Status/LOC at time of transport: A+O x4    Other pertinent information:   CIWA score: N/A   NIH score:  N/A

## 2024-12-01 NOTE — CONSULTS
[facility]  Report of Consultation    Godwin Fonseca Patient Status:  Observation    1980 MRN [id number]   [facility] 7NE-A Attending Yuriy Forrest MD   Hosp Day # 0 PCP Adrian Small MD     Reason for Consultation:   Weakness / +Trop    History of Present Illness:   Godwin Fonseca is a(n) 44 year old male with a past history of HTN, DVT / PE , Bipolar disorder, and valvular heart disease. Pt underwent MVR in  and redo with robotically assisted MVR .  Prior to MVR cath was unremarkable per pt.  Pt has demonstrated elevated Troponin values in the past - cath performed  with mild, non-obstructive CAD at that time despite troponin elevation.  Pt ha frequently been in the hospital with a variety of reasons, commonly with elevated troponin.  Values have been \"flat\" an atypical for injury.  Echo  in this context demonstrated NL EF.  Pt now presents with weakness.  States ambulation is limited by weakness.  No chest pain, pressure, heaviness, or tightness to suggest angina.  No SOB.  No palpitations.          Past Medical History:   Past Medical History:    Anxiety state    Arrhythmia    Asthma (Formerly [facility])    Attention deficit hyperactivity disorder (ADHD)    Back problem    Bipolar 1 disorder (Formerly [facility])    CKD (chronic kidney disease) stage 3, GFR 30-59 ml/min (Formerly [facility])    Dr Meeks    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Formerly [facility])    Congestive heart disease (Formerly [facility])    COPD (chronic obstructive pulmonary disease) (Formerly [facility])    Coronary atherosclerosis    Deep vein thrombosis (Formerly [facility])    at age 17 R/T cast    Depression    Diabetes (Formerly [facility])    Dialysis patient (Formerly [facility])    Diverticulosis of large intestine    Essential hypertension    3/21 echo: severe concentric LVH with normal EF and no MR or pHTN    Extrinsic asthma, unspecified    Heart attack (Formerly [facility])    - angiogram- no intervention    Heart valve disease    mitral valve repair in /    High blood pressure    High cholesterol    History of blood  transfusion    History of mitral valve repair    Hyperlipidemia    Low back pain    tight and stiff after sweeping and mopping    LVH (left ventricular hypertrophy)    Migraines    Mixed hyperlipidemia     HDL 38 LDL 97 VLDL 57     Monoclonal gammopathy    IgG kappa     Muscle weakness    MVP (mitral valve prolapse)    Repair 1994 at [place]; echoes as recently as 3/21 show mild or trivial MR and no stenosis    Neuropathy    Osteoarthritis    hip ,knees    Pneumonia due to organism    Pulmonary embolism (HCC)    Renal disorder    Stroke (HCC)    TIA (transient ischemic attack)    Initial history of left-sided weakness and slurred speech. (+) cocaine. MRI of the brain, CT angiogram of the head and neck, and 2D echo are all unremarkable.     TMJ (dislocation of temporomandibular joint)    Troponin level elevated    Trop 60 60 47 with TIA and no CP: Lexiscan negative with EF 51    Visual impairment       Social History:   Smoking:  None  Alcohol:  None    Family History:   No family history of premature arthrosclerotic heart disease     Medications:   Scheduled:    buPROPion ER  150 mg Oral Daily    carvedilol  25 mg Oral BID with meals    fenofibrate micronized  134 mg Oral Nightly    FLUoxetine  40 mg Oral Daily    fluticasone propionate  1 spray Each Nare Daily    hydrALAZINE  50 mg Oral Q8H MARTHA    lamoTRIgine  100 mg Oral Daily    losartan  100 mg Oral Daily    memantine  5 mg Oral BID    NIFEdipine ER  60 mg Oral BID    tamsulosin  0.4 mg Oral Daily    heparin  5,000 Units Subcutaneous Q8H MARTHA    ARIPiprazole  15 mg Oral Daily    heparin  1.5 mL Intracatheter Once       Continuous Infusion:       PRN Medications:     albuterol    acetaminophen **OR** HYDROcodone-acetaminophen **OR** HYDROcodone-acetaminophen    sodium chloride **AND** albumin human    Outpatient Medications:   Medications Ordered Prior to Encounter[1]    Allergies:   Allergies[2]    Review of Systems:   No fevers, chills, change in  weight or bowel habits.  Ten point review of systems is otherwise negative or unremarkable.    Physical Exam:   Vitals:    12/01/24 1200   BP: 139/88   Pulse: 99   Resp: 14   Temp: 97.9 °F (36.6 °C)     Wt Readings from Last 3 Encounters:   12/01/24 240 lb (108.9 kg)   11/19/24 245 lb 9.5 oz (111.4 kg)   11/06/24 240 lb (108.9 kg)           General: Well developed, well nourished male.  Pt is in no acute distress.  HEENT:   Normocephalic.  Atraumatic.  Eyes with no scleral icterus.  Neck: Supple.  No JVD.  Carotids 2+ and equal in symmetric fashion.  No bruits are noted.  Cardiac: Regular rate and rhythm.   There is a normal S1 and S2.  No S3 or S4.  No murmurs, rubs, or gallops.  PMI is non-displaced with a normal apical impulse.  Lungs: Clear to ascultation bilaterally.  No focal rales, rhonchi, or wheezes.  Good air movement is noted throughout all lung fields.  Abdomen: Soft.  Non-distended.  Non-tender.  Bowel sounds are present and normoactive.  No guarding or rebound.   Extremities: Extremities do not demonstrate any evidence of peripheral edema.   No cyanosis or clubbing of the digits is appreciated.  Femoral, Dorsalis Pedis, and Posterior Tibialis  pulses are 2+ and equal in a symmetric fashion.  Neurologic: Alert and oriented, normal affect.  No gross deficit appreciated.  Integument:  No visible rashes are appreciated.      Laboratories and Data:   Labs:    Recent Labs   Lab 12/01/24  0210   *   BUN 80*   CREATSERUM 12.46*   CA 8.7   ALB 4.4      K 4.2      CO2 21.0   ALKPHO 104   AST 19   ALT 11   BILT 0.3   TP 7.3       Recent Labs   Lab 12/01/24  0210   RBC 2.72*   HGB 8.9*   HCT 25.3*   MCV 93.0   MCH 32.7   MCHC 35.2   RDW 13.9   NEPRELIM 4.08   WBC 6.8   .0       No results for input(s): \"PTP\", \"INR\" in the last 168 hours.    No results for input(s): \"TROP\", \"CK\" in the last 168 hours.    Diagnostics:   Tele: SR  EKG: SR.  LVH.  ST and T abn. PRWP  Echo 11/24:    Conclusions:     1. Left ventricle: The cavity size was normal. Wall thickness was moderately      increased. Systolic function was normal. The estimated ejection fraction      was 60-65%, by visual assessment. No diagnostic evidence for regional      wall motion abnormalities. Features are consistent with a pseudonormal      left ventricular filling pattern, with concomitant abnormal relaxation      and increased filling pressure - grade 2 diastolic dysfunction.   2. Left atrium: The atrium was markedly dilated. The left atrial volume was      markedly increased.   3. Mitral valve: There was mild to moderate regurgitation, with multiple      jets. The mean diastolic gradient was 3mm Hg at a heart rate of 78 bpm.       Assessment:   Chronic Troponin elevation  -Potentially reflects ESRD  -Less than last admission  -Relatively \"flat\" / atypical pattern  2.     Fatigue  3.     HTN  4.     ESRD on HD  5.     Bipolar  6.     Severe MR    -S/P MVR 1994   -S/P robotic assisted MVR redo 2022  7.     Non-obstructive minimal CAD on cath 4/24, as above  8.     NL EF 11/24 Echo  9.     Modest BP  10.   Anemia      Plan:   Reduce Hydralazine   Repeat Troponin to trend values - anticipate \"flat\" pattern   Volume optimization with HD   Hb per primary Svc   Tele monitor    Yogesh Montemayor MD  12/1/2024  1:57 PM         [1]   Current Facility-Administered Medications on File Prior to Encounter   Medication Dose Route Frequency Provider Last Rate Last Admin    [COMPLETED] ondansetron (Zofran) 4 MG/2ML injection 4 mg  4 mg Intravenous Once SigifredoCata matthews APRN   4 mg at 11/16/24 1304    [COMPLETED] aspirin chewable tab 324 mg  324 mg Oral Once Maria Elena Mendoza DO   324 mg at 11/16/24 1357    [COMPLETED] HYDROmorphone (Dilaudid) 1 MG/ML injection 0.5 mg  0.5 mg Intravenous Q30 Min PRN Maria Elena Mendoza DO   0.5 mg at 11/16/24 1721    [COMPLETED] pantoprazole (Protonix) 40 mg in sodium chloride 0.9% PF 10 mL IV push  40 mg Intravenous  Once Maria Elena Mendoza DO   40 mg at 24 1412    [] dextrose 5%-sodium chloride 0.45% infusion   Intravenous Continuous Maria Elena Mendoza DO        [COMPLETED] sodium chloride 0.9% infusion   Intravenous Once Maria Elena Mendoza  mL/hr at 24 1414 New Bag at 24 1414    [COMPLETED] labetalol (Trandate) 5 mg/mL injection 20 mg  20 mg Intravenous Once Maria Elena Mendoza DO   20 mg at 24 1622    [COMPLETED] hydrALAzine (Apresoline) 20 mg/mL injection 10 mg  10 mg Intravenous Once Ihsan Cassidy MD   10 mg at 24 1226    [COMPLETED] labetalol (Trandate) 5 mg/mL injection 20 mg  20 mg Intravenous Once Ihsan Cassidy MD   20 mg at 24 1359    [COMPLETED] HYDROmorphone (Dilaudid) 1 MG/ML injection 1 mg  1 mg Intravenous Once Ihsan Cassidy MD   1 mg at 24 1359    [COMPLETED] aspirin chewable tab 324 mg  324 mg Oral Once Ihsan Cassidy MD   324 mg at 24 1404    [] heparin (Porcine) 1000 UNIT/ML injection 1,500 Units  1.5 mL Intracatheter Once Lenka Bond MD        [COMPLETED] diphenhydrAMINE (Benadryl) 50 mg/mL  injection 12.5 mg  12.5 mg Intravenous Once David Valdivia MD   12.5 mg at 24 2150    [COMPLETED] HYDROcodone-acetaminophen (Norco) 5-325 MG per tab 2 tablet  2 tablet Oral Once Gavi Charles MD   2 tablet at 10/25/24 0029    [COMPLETED] alteplase (Activase) 2 mg in sterile water for injection (PF) 2.2 mL IV push to declot line  2 mg Intravenous Once Samaria Nava MD   2 mg at 10/21/24 1611    [COMPLETED] alteplase (Activase) 2 mg in sterile water for injection (PF) 2.2 mL IV push to declot line  2 mg Intravenous Once Samaria Nava MD   2 mg at 10/21/24 1611    [COMPLETED] heparin (Porcine) 1000 UNIT/ML injection 1,500 Units  1.5 mL Intracatheter Once Samaria Nava MD   1,500 Units at 10/22/24 0020    [] sodium chloride 0.9 % IV bolus 100 mL  100 mL Intravenous Q30 Min PRN Lenka Bond MD        And     [] albumin human (Albumin) 25% injection 25 g  25 g Intravenous PRN Dialysis Lenka Bond MD        [COMPLETED] heparin (Porcine) 1000 UNIT/ML injection 1,500 Units  1.5 mL Intracatheter Once Lenka Bond MD   1,500 Units at 10/18/24 2020    [COMPLETED] alteplase (Activase) 2 mg in sterile water for injection (PF) 2.2 mL IV push to declot line  2 mg Intravenous Once Lenka Bond MD   2 mg at 10/18/24 1623    [COMPLETED] alteplase (Activase) 2 mg in sterile water for injection (PF) 2.2 mL IV push to declot line  2 mg Intravenous Once Lenka Bond MD   2 mg at 10/18/24 162    [COMPLETED] nitroglycerin (Nitrostat) SL tab 0.4 mg  0.4 mg Sublingual Once Stacy Shane MD   0.4 mg at 10/17/24 0731    [COMPLETED] cefTRIAXone (Rocephin) 2 g in sodium chloride 0.9% 100 mL IVPB-ADDV  2 g Intravenous Once Stacy Shane MD   Stopped at 10/17/24 0942    [COMPLETED] furosemide (Lasix) 10 mg/mL injection 80 mg  80 mg Intravenous Once Stacy Shane MD   80 mg at 10/17/24 0859    [COMPLETED] cloNIDine (Catapres) tab 0.1 mg  0.1 mg Oral Once Stacy Shane MD   0.1 mg at 10/17/24 0832    [COMPLETED] hydrALAzine (Apresoline) 20 mg/mL injection 20 mg  20 mg Intravenous Once Stacy Shane MD   20 mg at 10/17/24 0901    [COMPLETED] fentaNYL (Sublimaze) 50 mcg/mL injection 50 mcg  50 mcg Intravenous Once Stacy Shane MD   50 mcg at 10/17/24 0920    [COMPLETED] labetalol (Trandate) 5 mg/mL injection 20 mg  20 mg Intravenous Once Stacy Shane MD   20 mg at 10/17/24 0957    [] sodium chloride 0.9 % IV bolus 100 mL  100 mL Intravenous Q30 Min PRN Lenka Bond MD        And    [] albumin human (Albumin) 25% injection 25 g  25 g Intravenous PRN Dialysis Lenka Bond MD        [COMPLETED] heparin (Porcine) 1000 UNIT/ML injection 1,500 Units  1.5 mL Intracatheter Once Lenka Bond MD   1,500 Units at 10/17/24 2000    [COMPLETED] influenza virus trivalent PF (Fluzone Trivalent) 0.5 mL IM injection (ages 6 months to 64  years) 0.5 mL  0.5 mL Intramuscular Prior to discharge Vicky Weiss DO   0.5 mL at 10/08/24 1549    [COMPLETED] heparin (Porcine) 1000 UNIT/ML injection 1,500 Units  1.5 mL Intracatheter Once Samaria Nava MD   1,500 Units at 10/07/24 1531    [COMPLETED] vancomycin (Vancocin) 1,000 mg in sodium chloride 0.9% 250 mL IVPB-ADDV  10 mg/kg Intravenous Once Phong Freedman DO   Stopped at 10/08/24 1142    [COMPLETED] HYDROmorphone (Dilaudid) 1 MG/ML injection 0.5 mg  0.5 mg Intravenous Once Deanna Cervantes MD   0.5 mg at 10/06/24 2130    [COMPLETED] ondansetron (Zofran) 4 MG/2ML injection 4 mg  4 mg Intravenous Once Deanna Cervantes MD   4 mg at 10/06/24 2132    [COMPLETED] sodium chloride 0.9 % IV bolus 1,000 mL  1,000 mL Intravenous Once Deanna Cervantes MD   Stopped at 10/06/24 2230    [COMPLETED] pantoprazole (Protonix) 80 mg in sodium chloride 0.9% PF 20 mL IV push  80 mg Intravenous Once Deanna Cervantes MD   80 mg at 10/06/24 2135    [COMPLETED] iopamidol 76% (ISOVUE-370) injection for power injector  100 mL Intravenous ONCE PRN Deanna Cervantes MD   100 mL at 10/06/24 2212    [COMPLETED] HYDROmorphone (Dilaudid) 1 MG/ML injection 0.5 mg  0.5 mg Intravenous Once Deanna Cervantes MD   0.5 mg at 10/06/24 2315    [COMPLETED] vancomycin (Vancocin) 2.25 g in sodium chloride 0.9% 500 mL IVPB premix  25 mg/kg Intravenous Once Selam Jesus  mL/hr at 09/30/24 1344 2,250 mg at 09/30/24 1344    [COMPLETED] heparin (Porcine) 1000 UNIT/ML injection 4,000 Units  4,000 Units Intravenous Once Samaria Nava MD   4,000 Units at 09/30/24 1311    [COMPLETED] HYDROcodone-acetaminophen (Norco) 5-325 MG per tab 2 tablet  2 tablet Oral Once Mckinley Macedo MD   2 tablet at 09/29/24 1447    [COMPLETED] vancomycin (Vancocin) 2,250 mg in sodium chloride 0.9% 500 mL IVPB  25 mg/kg Intravenous Once Mckinley Macedo  mL/hr at 09/29/24 1559 2,250 mg at 09/29/24 1559    [COMPLETED] iopamidol 76% (ISOVUE-370) injection for  power injector  75 mL Intravenous ONCE PRN Mckinley Macedo MD   75 mL at 24 1645    [COMPLETED] HYDROmorphone (Dilaudid) 1 MG/ML injection 0.5 mg  0.5 mg Intravenous Once Petar Garcia MD   0.5 mg at 24 0652    [COMPLETED] ondansetron (Zofran) 4 MG/2ML injection 4 mg  4 mg Intravenous Once Petar Garcia MD   4 mg at 24 0652    [COMPLETED] hydrALAzine (Apresoline) 20 mg/mL injection 10 mg  10 mg Intravenous Once Petar Garcia MD   10 mg at 24 0713    [COMPLETED] cloNIDine (Catapres) tab 0.1 mg  0.1 mg Oral Once Petar Garcia MD   0.1 mg at 24 0758    [COMPLETED] hydrALAzine (Apresoline) 20 mg/mL injection 10 mg  10 mg Intravenous Once Petar Garcia MD   10 mg at 24 0758    [COMPLETED] lidocaine-EPINEPHrine (Xylocaine-Epinephrine) 2 %-1:[id number] injection             [COMPLETED] lidocaine (Xylocaine) 1 % injection             [COMPLETED] fentaNYL (Sublimaze) 50 mcg/mL injection             [COMPLETED] midazolam (Versed) 2 MG/2ML injection             [COMPLETED] ceFAZolin (Ancef) 1 g injection             [COMPLETED] heparin (Porcine) 5000 UNIT/ML injection             [COMPLETED] fentaNYL (Sublimaze) 50 mcg/mL injection             [COMPLETED] midazolam (Versed) 2 MG/2ML injection             [] sodium chloride 0.9 % IV bolus 100 mL  100 mL Intravenous Q30 Min PRN Toni, Ali, DO        And    [] albumin human (Albumin) 25% injection 25 g  25 g Intravenous PRN Dialysis Toni, Ali, DO   25 g at 24 1553    [COMPLETED] HYDROmorphone (Dilaudid) 1 MG/ML injection 0.5 mg  0.5 mg Intravenous Q30 Min PRN Mckinley Saunders MD   0.5 mg at 09/15/24 2227    [] HYDROmorphone (Dilaudid) 1 MG/ML injection 0.5 mg  0.5 mg Intravenous Q30 Min PRN Mckinley Saunders MD        [] ondansetron (Zofran) 4 MG/2ML injection 4 mg  4 mg Intravenous Q4H PRN Mckinley Saunders MD   4 mg at 24 0021    [COMPLETED] furosemide (Lasix) 10 mg/mL injection 40 mg  40 mg Intravenous  Once Mckinley Saunders MD   40 mg at 09/15/24 2037    [COMPLETED] cloNIDine (Catapres) tab 0.1 mg  0.1 mg Oral Once Mckinley Saunders MD   0.1 mg at 09/15/24 2130    [COMPLETED] furosemide (Lasix) 10 mg/mL injection 40 mg  40 mg Intravenous Once Mckinley Saunders MD   40 mg at 09/15/24 2132     Current Outpatient Medications on File Prior to Encounter   Medication Sig Dispense Refill    HYDROcodone-acetaminophen 5-325 MG Oral Tab Take 1 tablet by mouth every 4 (four) hours as needed. 15 tablet 0    hydrocortisone 25 MG Rectal Suppos Place 1 suppository (25 mg total) rectally 2 (two) times daily. 60 suppository 0    VYVANSE 60 MG Oral Cap Take 1 capsule (60 mg total) by mouth every morning.      lamoTRIgine 100 MG Oral Tab Take 1 tablet (100 mg total) by mouth daily.      hydrALAZINE 50 MG Oral Tab Take 1 tablet (50 mg total) by mouth every 8 (eight) hours. 90 tablet 0    memantine 5 MG Oral Tab Take 1 tablet (5 mg total) by mouth 2 (two) times daily.      albuterol 108 (90 Base) MCG/ACT Inhalation Aero Soln Inhale 2 puffs into the lungs every 6 (six) hours as needed for Wheezing.      tamsulosin 0.4 MG Oral Cap Take 1 capsule (0.4 mg total) by mouth daily.      ARIPiprazole 15 MG Oral Tab Take 1 tablet (15 mg total) by mouth daily.      buPROPion  MG Oral Tablet 24 Hr Take 1 tablet (150 mg total) by mouth daily.      FLUoxetine HCl 40 MG Oral Cap Take 1 capsule (40 mg total) by mouth daily. 7 capsule 0    RENVELA 800 MG Oral Tab Take 1 tablet (800 mg total) by mouth 3 (three) times daily with meals.      losartan 100 MG Oral Tab Take 1 tablet (100 mg total) by mouth daily. Hold if systolic blood pressure <130      NIFEdipine ER 60 MG Oral Tablet 24 Hr Take 1 tablet (60 mg total) by mouth in the morning and 1 tablet (60 mg total) before bedtime. Hold if systolic blood pressure <130.      carvedilol 25 MG Oral Tab Take 1 tablet (25 mg total) by mouth in the morning and 1 tablet (25 mg total) in the evening. Take with meals. 60  tablet 6    Fenofibrate 134 MG Oral Cap Take 1 capsule by mouth nightly. 90 capsule 1    Fluticasone Propionate 50 MCG/ACT Nasal Suspension SPRAY ONCE INTO EACH NOSTRIL BID PRN 15.8 mL 0   [2]   Allergies  Allergen Reactions    Hydrochlorothiazide RASH and HIVES

## 2024-12-01 NOTE — PLAN OF CARE
Assumed care at 0730  Patient alert, oriented x 4  VSS, RA throughout shift, NSR on tele  Pain controlled by PRNs  Up x SBA  Voiding in bathroom  (-) BM this shift  Tolerating diet well  HD done this shift  Call light within reach     Plan to continue to monitor  Patient updated on plan of care

## 2024-12-01 NOTE — ED INITIAL ASSESSMENT (HPI)
Two day history of intermittent nose bleeds and low blood pressure. Low back pain with weakness and difficulty standing. Occasional lightheadedness. Missed dialysis on Saturday

## 2024-12-02 ENCOUNTER — APPOINTMENT (OUTPATIENT)
Dept: GENERAL RADIOLOGY | Facility: HOSPITAL | Age: 44
End: 2024-12-02
Attending: INTERNAL MEDICINE
Payer: MEDICARE

## 2024-12-02 LAB
ALBUMIN SERPL-MCNC: 4.2 G/DL (ref 3.2–4.8)
ALBUMIN SERPL-MCNC: 4.2 G/DL (ref 3.2–4.8)
ALBUMIN/GLOB SERPL: 1.4 {RATIO} (ref 1–2)
ALP LIVER SERPL-CCNC: 109 U/L
ALT SERPL-CCNC: 9 U/L
ANION GAP SERPL CALC-SCNC: 12 MMOL/L (ref 0–18)
ANION GAP SERPL CALC-SCNC: 12 MMOL/L (ref 0–18)
AST SERPL-CCNC: 17 U/L (ref ?–34)
ATRIAL RATE: 94 BPM
BASOPHILS # BLD AUTO: 0.07 X10(3) UL (ref 0–0.2)
BASOPHILS NFR BLD AUTO: 1.5 %
BILIRUB SERPL-MCNC: 0.5 MG/DL (ref 0.3–1.2)
BUN BLD-MCNC: 41 MG/DL (ref 9–23)
BUN BLD-MCNC: 41 MG/DL (ref 9–23)
CALCIUM BLD-MCNC: 8.8 MG/DL (ref 8.7–10.4)
CALCIUM BLD-MCNC: 8.8 MG/DL (ref 8.7–10.4)
CHLORIDE SERPL-SCNC: 98 MMOL/L (ref 98–112)
CHLORIDE SERPL-SCNC: 98 MMOL/L (ref 98–112)
CO2 SERPL-SCNC: 26 MMOL/L (ref 21–32)
CO2 SERPL-SCNC: 26 MMOL/L (ref 21–32)
CREAT BLD-MCNC: 7.87 MG/DL
CREAT BLD-MCNC: 7.87 MG/DL
EGFRCR SERPLBLD CKD-EPI 2021: 8 ML/MIN/1.73M2 (ref 60–?)
EGFRCR SERPLBLD CKD-EPI 2021: 8 ML/MIN/1.73M2 (ref 60–?)
EOSINOPHIL # BLD AUTO: 0.27 X10(3) UL (ref 0–0.7)
EOSINOPHIL NFR BLD AUTO: 5.8 %
ERYTHROCYTE [DISTWIDTH] IN BLOOD BY AUTOMATED COUNT: 13.6 %
GLOBULIN PLAS-MCNC: 3 G/DL (ref 2–3.5)
GLUCOSE BLD-MCNC: 103 MG/DL (ref 70–99)
GLUCOSE BLD-MCNC: 107 MG/DL (ref 70–99)
GLUCOSE BLD-MCNC: 209 MG/DL (ref 70–99)
GLUCOSE BLD-MCNC: 209 MG/DL (ref 70–99)
HCT VFR BLD AUTO: 28 %
HGB BLD-MCNC: 9.6 G/DL
IMM GRANULOCYTES # BLD AUTO: 0.01 X10(3) UL (ref 0–1)
IMM GRANULOCYTES NFR BLD: 0.2 %
LACTATE SERPL-SCNC: 1.2 MMOL/L (ref 0.5–2)
LYMPHOCYTES # BLD AUTO: 0.67 X10(3) UL (ref 1–4)
LYMPHOCYTES NFR BLD AUTO: 14.4 %
MAGNESIUM SERPL-MCNC: 2.1 MG/DL (ref 1.6–2.6)
MCH RBC QN AUTO: 31.1 PG (ref 26–34)
MCHC RBC AUTO-ENTMCNC: 34.3 G/DL (ref 31–37)
MCV RBC AUTO: 90.6 FL
MONOCYTES # BLD AUTO: 0.44 X10(3) UL (ref 0.1–1)
MONOCYTES NFR BLD AUTO: 9.5 %
NEUTROPHILS # BLD AUTO: 3.18 X10 (3) UL (ref 1.5–7.7)
NEUTROPHILS # BLD AUTO: 3.18 X10(3) UL (ref 1.5–7.7)
NEUTROPHILS NFR BLD AUTO: 68.6 %
OSMOLALITY SERPL CALC.SUM OF ELEC: 298 MOSM/KG (ref 275–295)
OSMOLALITY SERPL CALC.SUM OF ELEC: 298 MOSM/KG (ref 275–295)
P AXIS: 13 DEGREES
P-R INTERVAL: 204 MS
PHOSPHATE SERPL-MCNC: 6.8 MG/DL (ref 2.4–5.1)
PLATELET # BLD AUTO: 230 10(3)UL (ref 150–450)
POTASSIUM SERPL-SCNC: 4 MMOL/L (ref 3.5–5.1)
POTASSIUM SERPL-SCNC: 4 MMOL/L (ref 3.5–5.1)
PROCALCITONIN SERPL-MCNC: 1.44 NG/ML (ref ?–0.05)
PROT SERPL-MCNC: 7.2 G/DL (ref 5.7–8.2)
Q-T INTERVAL: 394 MS
QRS DURATION: 96 MS
QTC CALCULATION (BEZET): 492 MS
R AXIS: 19 DEGREES
RBC # BLD AUTO: 3.09 X10(6)UL
SODIUM SERPL-SCNC: 136 MMOL/L (ref 136–145)
SODIUM SERPL-SCNC: 136 MMOL/L (ref 136–145)
T AXIS: 108 DEGREES
VENTRICULAR RATE: 94 BPM
WBC # BLD AUTO: 4.6 X10(3) UL (ref 4–11)

## 2024-12-02 PROCEDURE — 71045 X-RAY EXAM CHEST 1 VIEW: CPT | Performed by: INTERNAL MEDICINE

## 2024-12-02 RX ORDER — ACETAMINOPHEN 500 MG
500 TABLET ORAL EVERY 6 HOURS PRN
Status: DISCONTINUED | OUTPATIENT
Start: 2024-12-02 | End: 2024-12-07

## 2024-12-02 RX ORDER — ACETAMINOPHEN 500 MG
1000 TABLET ORAL EVERY 6 HOURS PRN
Status: DISCONTINUED | OUTPATIENT
Start: 2024-12-02 | End: 2024-12-07

## 2024-12-02 RX ORDER — ALBUMIN (HUMAN) 12.5 G/50ML
25 SOLUTION INTRAVENOUS
Status: ACTIVE | OUTPATIENT
Start: 2024-12-02 | End: 2024-12-04

## 2024-12-02 NOTE — PLAN OF CARE
Assumed care at 0730  Patient alert, oriented x 4  VSS, RA throughout shift, NSR on tele  Pain controlled by PRNs  Up x SBA  Voiding in bathroom  (-) BM this shift  Tolerating diet well  Call light within reach      Plan for dialysis tomorrow  Patient updated on plan of care

## 2024-12-02 NOTE — PROGRESS NOTES
[facility]   part of [facility]    Nephrology Progress Note    Godwin Fonseca Attending:  Yuriy Forrest MD     Cc: ESRD    SUBJECTIVE     Sweating, no other complaints     PHYSICAL EXAM   Vital signs: /66 (BP Location: Right arm)   Pulse 89   Temp 98.3 °F (36.8 °C) (Oral)   Resp 15   Ht 6' 2\" (1.88 m)   Wt 242 lb 9.6 oz (110 kg)   SpO2 99%   BMI 31.15 kg/m²   Temp (24hrs), Av °F (36.7 °C), Min:97.5 °F (36.4 °C), Max:98.3 °F (36.8 °C)       Intake/Output Summary (Last 24 hours) at 2024 1728  Last data filed at 2024 1100  Gross per 24 hour   Intake 910 ml   Output --   Net 910 ml     Wt Readings from Last 3 Encounters:   24 242 lb 9.6 oz (110 kg)   24 245 lb 9.5 oz (111.4 kg)   24 240 lb (108.9 kg)     General: NAD  HEENT: NCAT, EOMI, MMM  Neck: Supple   Cardiac: Regular rate and rhythm   Lungs: CTAB  Abdomen: Soft, non-tender  Extremities: No edema  Neurologic: No asterxis  Skin: Warm and dry, no rashes     MEDS     Current Facility-Administered Medications   Medication Dose Route Frequency    acetaminophen (Tylenol Extra Strength) tab 500 mg  500 mg Oral Q6H PRN    acetaminophen (Tylenol Extra Strength) tab 1,000 mg  1,000 mg Oral Q6H PRN    albuterol (Ventolin HFA) 108 (90 Base) MCG/ACT inhaler 2 puff  2 puff Inhalation Q6H PRN    buPROPion ER (Wellbutrin XL) 24 hr tab 150 mg  150 mg Oral Daily    carvedilol (Coreg) tab 25 mg  25 mg Oral BID with meals    fenofibrate micronized (Lofibra) cap 134 mg  134 mg Oral Nightly    FLUoxetine (PROzac) cap 40 mg  40 mg Oral Daily    fluticasone propionate (Flonase) 50 MCG/ACT nasal suspension 1 spray  1 spray Each Nare Daily    lamoTRIgine (LaMICtal) tab 100 mg  100 mg Oral Daily    losartan (Cozaar) tab 100 mg  100 mg Oral Daily    memantine (Namenda) tab 5 mg  5 mg Oral BID    NIFEdipine ER (Procardia-XL) 24 hr tab 60 mg  60 mg Oral BID    tamsulosin (Flomax) cap 0.4 mg  0.4 mg Oral Daily    heparin (Porcine) 5000  UNIT/ML injection 5,000 Units  5,000 Units Subcutaneous Q8H MARTHA    acetaminophen (Tylenol) tab 650 mg  650 mg Oral Q4H PRN    ARIPiprazole (Abilify) tab 15 mg  15 mg Oral Daily    sodium chloride 0.9 % IV bolus 100 mL  100 mL Intravenous Q30 Min PRN    And    albumin human (Albumin) 25% injection 25 g  25 g Intravenous PRN Dialysis    hydrALAZINE (Apresoline) tab 25 mg  25 mg Oral Q8H MARTHA    sevelamer carbonate (Renvela) tab 800 mg  800 mg Oral TID CC       LABS     Lab Results   Component Value Date    WBC 4.6 12/02/2024    HGB 9.6 12/02/2024    HCT 28.0 12/02/2024    .0 12/02/2024    CREATSERUM 7.87 12/02/2024    CREATSERUM 7.87 12/02/2024    BUN 41 12/02/2024    BUN 41 12/02/2024     12/02/2024     12/02/2024    K 4.0 12/02/2024    K 4.0 12/02/2024    CL 98 12/02/2024    CL 98 12/02/2024    CO2 26.0 12/02/2024    CO2 26.0 12/02/2024     12/02/2024     12/02/2024    CA 8.8 12/02/2024    CA 8.8 12/02/2024    ALB 4.2 12/02/2024    ALB 4.2 12/02/2024    ALKPHO 109 12/02/2024    BILT 0.5 12/02/2024    TP 7.2 12/02/2024    AST 17 12/02/2024    ALT 9 12/02/2024    MG 2.1 12/02/2024    PHOS 6.8 12/02/2024    PGLU 103 12/02/2024       IMAGING   All imaging studies personally reviewed.    No orders to display         ASSESSMENT & PLAN   44 year old male with history of ESRD on iHD MWF via [facility] CVC, LUE AVF, hyperaldosteronism, DM, bipolar disorder, recurrent GI bleed, pneumonia recently presented with weakness and nose bleeds    ESRD:  -- sp HD Sunday due to missed Friday.   -- will plan for HD Tuesday and Wednesday. Will use catheter here (has AVF but unclear what size needles they are using as outpt). HD 3K, UF 3-4L, HD directly managed  -- continue MWF HD schedule  -- avoid class 1 gadolinium agents     Anemia:  -- epo with HD if BP controlled     Hyperphosphatemia:  -- sevelamer 2400 TID AC  -- low phos diet - decrease nuts     HTN:  -- continue home meds     Diaphoresis  -- check blood  cultures and cultures from line    Thank you for allowing me to participate in the care of this patient. Please do not hesitate to call with questions or concerns.       Samaria Nava MD  [facility]ph[facility]

## 2024-12-02 NOTE — PHYSICAL THERAPY NOTE
PHYSICAL THERAPY EVALUATION - INPATIENT     Room Number: 7616/7616-A  Evaluation Date: 12/2/2024  Type of Evaluation: Initial  Physician Order: PT Eval and Treat    Presenting Problem: weakness, missed dialysis,  Co-Morbidities : ADD, bipolar, HTN, DVT/PE, DM, CKD, MGUS, TIA 2021  Reason for Therapy: Mobility Dysfunction and Discharge Planning    PHYSICAL THERAPY ASSESSMENT   Patient is a 44 year old male admitted 12/1/2024 for weakness, missed dialysis.  Prior to admission, patient's baseline is indepndent.  Patient is currently functioning near baseline with bed mobility, transfers, gait, and stair negotiation.  Patient is requiring stand-by assist and contact guard assist as a result of the following impairments: decreased functional strength, decreased endurance/aerobic capacity, impaired static and dynamic balance, impaired motor planning, decreased muscular endurance, and medical status.  Physical Therapy will continue to follow for duration of hospitalization.    Patient will benefit from continued skilled PT Services at discharge to promote prior level of function and safety with additional support and return home with home health PT.    PLAN DURING HOSPITALIZATION  Nursing Mobility Recommendation : 1 Assist  PT Device Recommendation: Rolling walker  PT Treatment Plan: Bed mobility;Body mechanics;Endurance;Energy conservation;Patient education;Family education;Gait training;Balance training;Transfer training;Strengthening;Neuromuscular re-educate  Rehab Potential : Fair  Frequency (Obs): 3x/week     CURRENT GOALS    Goal #1 Patient is able to demonstrate supine - sit EOB @ level: independent     Goal #2 Patient is able to demonstrate transfers EOB to/from Chair/Wheelchair at assistance level: supervision     Goal #3 Patient is able to ambulate 150 feet with assist device:  LRAD  at assistance level: supervision     Goal #4 Pt is able to ascend and descend stairs with supervision   Goal #5    Goal #6    Goal  Comments: Goals established on 2024      PHYSICAL THERAPY MEDICAL/SOCIAL HISTORY  History related to current admission: Patient is a 44 year old male admitted on 2024 from home for weakness, missed dialysis.     Previous Admissions in the past 6 months:   -2024: acute chest pain; not seen by therapy   -2024: expressive aphasia; rec OPPT  10/: pneumonia ; not seen by therapy   10/6-10/8/2024: Diverticulosis of colon with hemorrhage; not seen by therapy   -10/1/2024: hemodialysis catheter infection; no needs  9/: CKD 5; no needs  -2024: hypervolemia; not seen by therapy   [facility]       HOME SITUATION  Type of Home: House  Home Layout: Two level           Stairs to Bedroom: 13    Railing: Yes    Lives With: Parent(s)    Drives: Yes   Patient Regularly Uses: Reading glasses      Prior Level of [place]: Pt typically indep with ADLs and mobility. Notes having AD such as RW and cane, however has not been using. Notes it has been harder to get around at home recently. Denies any falls. Notes his right side is typically weaker. 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 Also has a cousin and aunt that have been helping as needed.     SUBJECTIVE  \"I'm pretty dizzy\"     OBJECTIVE  Precautions: Bed/chair alarm  Fall Risk: Standard fall risk    WEIGHT BEARING RESTRICTION     PAIN ASSESSMENT  Ratin          COGNITION  Overall Cognitive Status:  WFL - within functional limits    RANGE OF MOTION AND STRENGTH ASSESSMENT  See OT note for UE assessment      Lower extremity ROM is within functional limits      Lower extremity strength is within functional limits; grossly 4 to 5/5; mildly shaky in BLE during testing     BALANCE  Static Sitting: Good  Dynamic Sitting: Fair +  Static Standing: Fair -  Dynamic Standing: Poor +    ADDITIONAL TESTS  Additional Tests: Modified  Nunez              Modified Nunez: 1                  ACTIVITY TOLERANCE         Sittin/64  Standin/59  Supine 109/72                O2 WALK       NEUROLOGICAL FINDINGS  Neurological Findings: Coordination - Rapid Alternating Movement;Coordination - Heel to Shin;Sensation     Coordination - Heel to Shin: Symmetrical  Coordination - Rapid Alternating Movement: Symmetrical  Sensation: intact to light touch BLE         AM-PAC '6-Clicks' INPATIENT SHORT FORM - BASIC MOBILITY  How much difficulty does the patient currently have...  Patient Difficulty: Turning over in bed (including adjusting bedclothes, sheets and blankets)?: None   Patient Difficulty: Sitting down on and standing up from a chair with arms (e.g., wheelchair, bedside commode, etc.): A Little   Patient Difficulty: Moving from lying on back to sitting on the side of the bed?: None   How much help from another person does the patient currently need...   Help from Another: Moving to and from a bed to a chair (including a wheelchair)?: A Little   Help from Another: Need to walk in hospital room?: A Little   Help from Another: Climbing 3-5 steps with a railing?: A Little     AM-PAC Score:  Raw Score: 20   Approx Degree of Impairment: 35.83%   Standardized Score (AM-PAC Scale): 47.67   CMS Modifier (G-Code): CJ    FUNCTIONAL ABILITY STATUS  Gait Assessment   Functional Mobility/Gait Assessment  Distance (ft): 0    Skilled Therapy Provided     Bed Mobility:  Rolling: NT  Supine to sit: ind   Sit to supine: NT     Transfer Mobility:  Sit to stand: supervision   Stand to sit: supervision  Gait = NT    Therapist's Comments: RN cleared for session. Pt agreeable for therapy, received supine. Pt with overall indep bed mobility. Increased dizziness. BP readings as above. Unable to maintain upright standing posture due to increase in dizziness, pt preferring to look down. Deferred further ambulation due to increased dizziness and low BP. Instructed to call  for nursing staff for any needs and OOB mobility.     Exercise/Education Provided:  Bed mobility  Body mechanics  Energy conservation  Functional activity tolerated  Gait training  Posture  Strengthening  Transfer training    Patient End of Session: Needs met;Call light within reach;RN aware of session/findings;All patient questions and concerns addressed;Hospital anti-slip socks;Alarm set;In bed      Patient Evaluation Complexity Level:  History High - 3 or more personal factors and/or co-morbidities   Examination of body systems Low -  addressing 1-2 elements   Clinical Presentation Low- Stable   Clinical Decision Making Low Complexity       PT Session Time: 25 minutes   Therapeutic Activity: 10 minutes

## 2024-12-02 NOTE — OCCUPATIONAL THERAPY NOTE
OCCUPATIONAL THERAPY EVALUATION - INPATIENT     Room Number: 7616/7616-A  Evaluation Date: 12/2/2024  Type of Evaluation: Initial  Presenting Problem: weakness  Physician Order: IP Consult to Occupational Therapy  Reason for Therapy: ADL/IADL Dysfunction and Discharge Planning    OCCUPATIONAL THERAPY ASSESSMENT   Patient is currently functioning below baseline with self-care and functional mobility. Prior to admission, patient's baseline is mod I w/ self-care.  Patient is requiring supervision to min A as a result of the following impairments: decreased endurance, impaired standing balance, and medical status. Occupational Therapy will continue to follow for duration of hospitalization.    Patient will benefit from continued skilled OT Services at discharge to promote prior level of function.  Anticipate patient will return home with home health OT    History Related to Current Admission: Patient is a 44 year old male admitted on 12/1/2024 with Presenting Problem: weakness.   Co-Morbidities : ADD, bipolar, HTN, DVT/PE, DM, CKD, MGUS, TIA 2021    Recent hospitalizations:  11/16-11/19/24: Acute chest pain, GIB=home  11/6-11/9/24: expressive aphasia, elevated troponin=home  10/17/24-10/22/24: HTN urgency=home  10/6/24-10/8/24: diverticulosis of colon w/ hemorrhage=home  09/29/24-10/1/24: hemodialysis catheter infection= home  9/15-9/20/24: rectal bleeding=home  08/30/24-9/2/24: Acute respiratory failure w/ hypoxia, GIB=home  06/14-6/17/24: missed HD sessions=home  05/14/24-5/17/24: pneumonia=home  05/05/24-5/9/24: missed HD, abdominal pain=home    Recommendations for nursing staff:   Transfers: one person assist and RW  Toileting location: toilet w/ riser if BP stable    EVALUATION SESSION    Patient Start of Session: supine    FUNCTIONAL TRANSFER ASSESSMENT  Sit to Stand: Edge of Bed; Chair  Edge of Bed: Contact Guard Assist  Chair: Contact Guard Assist    BED MOBILITY  Rolling: Supervision  Supine to Sit :  Supervision  Sit to Supine (OT): Supervision    BALANCE ASSESSMENT  Static Sitting: Independent  Static Standing: Stand-by Assist    FUNCTIONAL ADL ASSESSMENT  Eating: Modified Independent  Grooming Seated: Supervision  LB Dressing Seated: Minimal Assist    ACTIVITY TOLERANCE:   Pt w/ c/o LH w/ sitting and worsens with standing   90/68 standing; 105/73 sitting; 140's in supine prior to act                      O2 SATURATIONS   94-95% on room air throughout session     Cognition  Alert, oriented x 3    Upper extremity  BUE AROM and strength WFL    EDUCATION PROVIDED  Patient Education : Role of Occupational Therapy; Plan of Care; Discharge Recommendations; Functional Transfer Techniques; Fall Prevention; Posture/Positioning; Edema Reduction; Energy Conservation; Proper Body Mechanics  Patient's Response to Education: Verbalized Understanding; Requires Further Education    Equipment used: RW  Demonstrates functional use, Would benefit from additional trial     Therapist comments: OT educated patient on safety,  sequencing, energy conservation, pain management, home modifications and adaptive equipment to increase independence with ADLs.    Patient w/ c/o LH with sitting and worsening in standing.  BP did drop during standing.  Patient able to maintain standing with supervision/SBA during BP check.  Took side steps to HOB with BUE support on RW w/ CGA.  Patient completed return to supine with supervision.  Scooting in bed w/ mod I.    Patient End of Session: In bed;Needs met;Call light within reach;RN aware of session/findings;All patient questions and concerns addressed;Alarm set;Discussed recommendations with /    OCCUPATIONAL PROFILE    HOME SITUATION  Type of Home: House  Home Layout: Two level  Lives With: Parent(s)    Toilet and Equipment: Standard height toilet  Shower/Tub and Equipment: Walk-in shower  Other Equipment: None    Occupation/Status: not working  Hand Dominance:  Right  Drives: Yes  Patient Regularly Uses: Reading glasses    Prior Level of Function: independent with ADLs and functional mobility without use of adaptive device.  Patient reports no recent falls.  Pt states his mother and aunt assist prn with IADLs when he is not feeling well.    SUBJECTIVE     PAIN ASSESSMENT  Rating: Unable to rate  Location: legs, back  Management Techniques: Nurse notified;Activity promotion;Repositioning    OBJECTIVE  Precautions: Bed/chair alarm  Fall Risk: High fall risk    WEIGHT BEARING RESTRICTION       ASSESSMENTS    AM-PAC '6-Clicks' Inpatient Daily Activity Short Form  -   Putting on and taking off regular lower body clothing?: A Little  -   Bathing (including washing, rinsing, drying)?: A Little  -   Toileting, which includes using toilet, bedpan or urinal? : A Little  -   Putting on and taking off regular upper body clothing?: None  -   Taking care of personal grooming such as brushing teeth?: None  -   Eating meals?: None    AM-PAC Score:  Score: 21  Approx Degree of Impairment: 32.79%  Standardized Score (AM-PAC Scale): 44.27    PLAN  OT Device Recommendations: TBD  OT Treatment Plan: Balance activities;Energy conservation/work simplification techniques;ADL training;Functional transfer training;UE strengthening/ROM;Endurance training;Patient/Family education;Patient/Family training;Cognitive reorientation;Equipment eval/education;Compensatory technique education  Rehab Potential : Good  Frequency: 3-5x/week  Number of Visits to Meet Established Goals: 7    ADL Goals  Patient will perform toileting with supervision and AE PRN  Patient will perform LB dressing with supervision and AE PRN    Functional Transfer Goals  Patient will perform bed mobility supine to sit with supervision  Patient will perform bed mobility sit to supine with supervision  Patient will perform toilet transfer with supervision    Additional Goals:  Patient will state precautions and maintain during  ADL      Patient Evaluation Complexity Level:   Occupational Profile/Medical History MODERATE - Expanded review of history including review of medical or therapy record   Specific performance deficits impacting engagement in ADL/IADL LOW  1 - 3 performance deficits    Client Assessment/Performance Deficits LOW - No comorbidities nor modifications of tasks    Clinical Decision Making LOW - Analysis of occupational profile, problem-focused assessments, limited treatment options    Overall Complexity LOW     OT Session Time: 30 minutes  Self-Care Home Management: 10 minutes

## 2024-12-02 NOTE — PROGRESS NOTES
[facility] Hospitalist Progress Note     PCP: Adrian Small MD    Chief Complaint: follow-up   Follow up for: The primary encounter diagnosis was Weakness. Diagnoses of Stage 5 chronic kidney disease on chronic dialysis (HCC) and Nosebleed were also pertinent to this visit.    Overnight/Interim Events:      SUBJECTIVE:  Pt feels tired. Some neck pain. Feels weak, legs not like normal. Discussed maybe norco contributing too?    OBJECTIVE:  Temp:  [97.5 °F (36.4 °C)-98.3 °F (36.8 °C)] 98.3 °F (36.8 °C)  Pulse:  [] 89  Resp:  [15-29] 15  BP: ()/() 105/66  SpO2:  [96 %-99 %] 99 %    Intake/Output:    Intake/Output Summary (Last 24 hours) at 12/2/2024 1740  Last data filed at 12/2/2024 1100  Gross per 24 hour   Intake 910 ml   Output --   Net 910 ml       Last 3 Weights   12/02/24 0442 242 lb 9.6 oz (110 kg)   12/01/24 0459 240 lb (108.9 kg)   12/01/24 0148 240 lb (108.9 kg)   11/19/24 0519 245 lb 9.5 oz (111.4 kg)   11/18/24 0525 240 lb 11.9 oz (109.2 kg)   11/16/24 1723 240 lb 15.4 oz (109.3 kg)   11/16/24 1236 240 lb (108.9 kg)   11/06/24 1743 240 lb (108.9 kg)   11/06/24 1155 240 lb (108.9 kg)       Exam    General: Alert, no distress, appears stated age.     Head:  Normocephalic, without obvious abnormality, atraumatic.   Eyes:  Sclera anicteric, EOMs intact.    Nose: Nares normal,  Mucosa normal    Throat: Lips normal   Neck: Supple, symmetrical, trachea midline   Lungs:   Clear to auscultation bilaterally. Normal effort   Chest wall:  No tenderness or deformity   Heart:  Regular rate and rhythm, S1, S2 normal, no murmur, rub or gallop appreciated   Abdomen:   Soft, NT/ND, Bowel sounds normal. No masses,  No organomegaly.    Extremities: Extremities normal, atraumatic, no cyanosis or LE edema.   Skin: Skin color, texture, turgor normal. No rashes or lesions.    Neurologic: Moving all extremities spontaneously, no focal deficit appreciated      Data Review:       Labs:     Recent Labs   Lab  12/01/24  0210 12/02/24  0544   WBC 6.8 4.6   HGB 8.9* 9.6*   MCV 93.0 90.6   .0 230.0       Recent Labs   Lab 12/01/24  0210 12/02/24  0544    136  136   K 4.2 4.0  4.0    98  98   CO2 21.0 26.0  26.0   BUN 80* 41*  41*   CREATSERUM 12.46* 7.87*  7.87*   CA 8.7 8.8  8.8   MG  --  2.1   PHOS  --  6.8*   * 209*  209*       Recent Labs   Lab 12/01/24  0210 12/02/24  0544   ALT 11 9*   AST 19 17   ALB 4.4 4.2  4.2       Recent Labs   Lab 12/01/24  0524 12/01/24  1332 12/01/24  1726 12/01/24 2050 12/02/24  0441   PGLU 119* 110* 99 188* 103*       No results for input(s): \"TROP\" in the last 168 hours.      Meds:      buPROPion ER  150 mg Oral Daily    carvedilol  25 mg Oral BID with meals    fenofibrate micronized  134 mg Oral Nightly    FLUoxetine  40 mg Oral Daily    fluticasone propionate  1 spray Each Nare Daily    lamoTRIgine  100 mg Oral Daily    losartan  100 mg Oral Daily    memantine  5 mg Oral BID    NIFEdipine ER  60 mg Oral BID    tamsulosin  0.4 mg Oral Daily    heparin  5,000 Units Subcutaneous Q8H MARTHA    ARIPiprazole  15 mg Oral Daily    hydrALAZINE  25 mg Oral Q8H MARTHA    sevelamer carbonate  800 mg Oral TID CC         acetaminophen    acetaminophen    sodium chloride **AND** albumin human    albuterol    acetaminophen **OR** [DISCONTINUED] HYDROcodone-acetaminophen **OR** [DISCONTINUED] HYDROcodone-acetaminophen    sodium chloride **AND** albumin human       Assessment/Plan:     44 year old male with PMH including but not limited to ADD, bipolar, HTN, DVT/PE, hyperlipidemia, DM, CKD on dialysis, MGUS, cervical spondylosis, and TIA in 2021 who p/t [facility] ED c weakness and missed HD.     Weakness  - likely 2/2 missed HD, stressed need to go to HD even if weak as weill only make him weaker; BP meds possibly contributing as well   - discussed narcotics can cause lethargy/drowsiness, agreeable to hold norco and just try tylenol   - PT/OT  - if further worsening or sxs, can  consider neuro c/s, d/w neuro. Will wait to see sxs post holding BP meds   - has seen as o/p Vishal Hurt MD on 11/06/2024.   -CT head and MRI brain negative 11/16       ESRD  - per renal, plan HD T and W, got Sun     Elevated trop  - has had before, chronic   - apprec cards recs        Hyperphosphatemia:  -- sevelamer 2400 TID AC       Non-obstructive minimal CAD on cath 4/24  H/o severe MR s/p MVR, redo complex robotically assisted MVR 2022  Chronic HFpEF  HTN  - Hold hydralazine, losartan, and nifedipine given lower BPs and orthostatsis and weakness; coreg ok. Follow BP and sxs, d/w Yesenia Rubin MD   - pt reports Low BPs at home 80/60s  - echo EF 60-65%, G2DD  - h/o cath 2023 with mild irregularities     Diaphoresis  - noted later in day, d/w MD Brandy   - check Blood cxs peripherally and from line, PCT, LA, CXR, UA (if makes urine), CXR; temp/WBC ok  - drsg for line when came in not optimal     Anemia  Nose bleeds  - 2/2 ESRD, Hgb 8.9 to 9.6, follow     Finding of possible \"cystitis\" on imaging  - seen on previous CT as well  -Patient has had cystoscopy that looked okay in the past per his report.  He also has been treated for possible prostatitis.  He does seem to have more chronic discomfort in the suprapubic area.     LLQ lump- lipoma versus other subcutaneous nodule (?could it be from previous heparin injection site)  -No findings on imaging to suggest a deeper process.    - o/p f/u       # Cervical radiculopathy  -Pain control as needed  -Follow-up with spine surgeon        # Bipolar disorder  -Continue Abilify, bupropion, Lamictal   - follows c pyschiatrist, reports dose adjustment in meds     # Depression  -Continue fluoxetine        #COPD, chronic  -Continue home inhalers     Dispo: CTU; frequent re-admissions   - Lives c mom       Questions/concerns were discussed with patient by bedside. D/w RN, Total Time spent with patient and coordinating care:  80 minutes 1188-5143 and 4039-2498  including discussion of cause of weakness and diaphoresis and d/w care team.     Yuriy Forrest MD  [facility] [place] [facility]  [phone]  12/2/2024  5:40 PM

## 2024-12-02 NOTE — PROGRESS NOTES
Cardiology Progress Note    Godwin Fonseca Patient Status:  Inpatient    1980 MRN [id number]   Location [facility] 8NE-A Attending Laila Ryder MD   Hosp Day # 0 PCP Adrian Small MD     IMPRESSIONS:  Chronic troponin elevation: flat trend up to 255. Lower than last admission which was >2300  Cath  for elevated troponin with mild irregularities  h/o severe MR s/p MVR , redo complex robotically assisted MVR (Bal2022)   Chronic HFpEF: euvolemic   HTN  Bipolar disease  ESRD on HD  DVT/PE   History TIA  Orthostatic positive today. Fatigue and inability to stand due to weakness on admission            PLAN:  Given chronic elevations troponin and only mild \"irregularities\" at [facility]  for elevated troponin, will forgo ischemic eval given normal EF on echo without wall motion abnormalities on  .   Monitor for chest pains   Moderate LVH and mild to moderate MR  Tele   Hold PM hydralazine, losartan, and nifedipine given orthostatic issues this am. Ok to give coreg.        Subjective:  Orthostatic + this am     Objective:  /76 (BP Location: Right arm)   Pulse 91   Temp 98.2 °F (36.8 °C) (Oral)   Resp 21   Ht 6' 2\" (1.88 m)   Wt 242 lb 9.6 oz (110 kg)   SpO2 96%   BMI 31.15 kg/m²   Temp (24hrs), Av °F (36.7 °C), Min:97.5 °F (36.4 °C), Max:98.3 °F (36.8 °C)      Intake/Output Summary (Last 24 hours) at 2024 1334  Last data filed at 2024 1100  Gross per 24 hour   Intake 600 ml   Output --   Net 600 ml     Wt Readings from Last 3 Encounters:   24 242 lb 9.6 oz (110 kg)   24 245 lb 9.5 oz (111.4 kg)   24 240 lb (108.9 kg)       General: Awake and alert; in no acute distress  Cardiac: Regular rate and regular rhythm; no murmurs/rubs/gallops are appreciated  Lungs: Clear to auscultation bilaterally; no accessory muscle use  Abdomen: Soft, tender to touch but stable from prior   Extremities: No clubbing/cyanosis; moves all 4 extremities  normally    Current Facility-Administered Medications   Medication Dose Route Frequency    albuterol (Ventolin HFA) 108 (90 Base) MCG/ACT inhaler 2 puff  2 puff Inhalation Q6H PRN    buPROPion ER (Wellbutrin XL) 24 hr tab 150 mg  150 mg Oral Daily    carvedilol (Coreg) tab 25 mg  25 mg Oral BID with meals    fenofibrate micronized (Lofibra) cap 134 mg  134 mg Oral Nightly    FLUoxetine (PROzac) cap 40 mg  40 mg Oral Daily    fluticasone propionate (Flonase) 50 MCG/ACT nasal suspension 1 spray  1 spray Each Nare Daily    lamoTRIgine (LaMICtal) tab 100 mg  100 mg Oral Daily    losartan (Cozaar) tab 100 mg  100 mg Oral Daily    memantine (Namenda) tab 5 mg  5 mg Oral BID    NIFEdipine ER (Procardia-XL) 24 hr tab 60 mg  60 mg Oral BID    tamsulosin (Flomax) cap 0.4 mg  0.4 mg Oral Daily    heparin (Porcine) 5000 UNIT/ML injection 5,000 Units  5,000 Units Subcutaneous Q8H MARTHA    acetaminophen (Tylenol) tab 650 mg  650 mg Oral Q4H PRN    Or    HYDROcodone-acetaminophen (Norco) 5-325 MG per tab 1 tablet  1 tablet Oral Q4H PRN    Or    HYDROcodone-acetaminophen (Norco) 5-325 MG per tab 2 tablet  2 tablet Oral Q4H PRN    ARIPiprazole (Abilify) tab 15 mg  15 mg Oral Daily    sodium chloride 0.9 % IV bolus 100 mL  100 mL Intravenous Q30 Min PRN    And    albumin human (Albumin) 25% injection 25 g  25 g Intravenous PRN Dialysis    hydrALAZINE (Apresoline) tab 25 mg  25 mg Oral Q8H MARTHA    sevelamer carbonate (Renvela) tab 800 mg  800 mg Oral TID CC       Laboratory Data:  Lab Results   Component Value Date    WBC 4.6 12/02/2024    HGB 9.6 12/02/2024    HCT 28.0 12/02/2024    .0 12/02/2024       Lab Results   Component Value Date    INR 1.00 11/06/2024    INR 1.06 09/29/2024    INR 1.11 09/15/2024     Lab Results   Component Value Date     12/02/2024     12/02/2024    K 4.0 12/02/2024    K 4.0 12/02/2024    CL 98 12/02/2024    CL 98 12/02/2024    CO2 26.0 12/02/2024    CO2 26.0 12/02/2024    BUN 41 12/02/2024     BUN 41 12/02/2024    CREATSERUM 7.87 12/02/2024    CREATSERUM 7.87 12/02/2024     12/02/2024     12/02/2024    CA 8.8 12/02/2024    CA 8.8 12/02/2024    MG 2.1 12/02/2024       Telemetry: No malignant tachyarrhythmias or bradyarrhythmias      Thank you for allowing our practice to participate in the care of your patient. Please do not hesitate to contact me if you have any questions.    Yesenia Rubin MD

## 2024-12-02 NOTE — PLAN OF CARE
Assumed care 1900, 12/1, NOC. HD complete (4L out over Q4H), medications resumed, VSS, no acute changes, needs met

## 2024-12-03 LAB
ALBUMIN SERPL-MCNC: 3.9 G/DL (ref 3.2–4.8)
ANION GAP SERPL CALC-SCNC: 12 MMOL/L (ref 0–18)
BUN BLD-MCNC: 61 MG/DL (ref 9–23)
CALCIUM BLD-MCNC: 9 MG/DL (ref 8.7–10.4)
CHLORIDE SERPL-SCNC: 100 MMOL/L (ref 98–112)
CO2 SERPL-SCNC: 25 MMOL/L (ref 21–32)
CREAT BLD-MCNC: 9.68 MG/DL
EGFRCR SERPLBLD CKD-EPI 2021: 6 ML/MIN/1.73M2 (ref 60–?)
ERYTHROCYTE [DISTWIDTH] IN BLOOD BY AUTOMATED COUNT: 13.4 %
GLUCOSE BLD-MCNC: 102 MG/DL (ref 70–99)
GLUCOSE BLD-MCNC: 114 MG/DL (ref 70–99)
GLUCOSE BLD-MCNC: 132 MG/DL (ref 70–99)
GLUCOSE BLD-MCNC: 196 MG/DL (ref 70–99)
HCT VFR BLD AUTO: 28.5 %
HGB BLD-MCNC: 9.7 G/DL
MAGNESIUM SERPL-MCNC: 2.3 MG/DL (ref 1.6–2.6)
MCH RBC QN AUTO: 31.1 PG (ref 26–34)
MCHC RBC AUTO-ENTMCNC: 34 G/DL (ref 31–37)
MCV RBC AUTO: 91.3 FL
OSMOLALITY SERPL CALC.SUM OF ELEC: 301 MOSM/KG (ref 275–295)
PHOSPHATE SERPL-MCNC: 7.5 MG/DL (ref 2.4–5.1)
PLATELET # BLD AUTO: 255 10(3)UL (ref 150–450)
POTASSIUM SERPL-SCNC: 4.4 MMOL/L (ref 3.5–5.1)
RBC # BLD AUTO: 3.12 X10(6)UL
SODIUM SERPL-SCNC: 137 MMOL/L (ref 136–145)
WBC # BLD AUTO: 5.3 X10(3) UL (ref 4–11)

## 2024-12-03 PROCEDURE — 99223 1ST HOSP IP/OBS HIGH 75: CPT | Performed by: INTERNAL MEDICINE

## 2024-12-03 RX ORDER — HEPARIN SODIUM 1000 [USP'U]/ML
1.5 INJECTION, SOLUTION INTRAVENOUS; SUBCUTANEOUS
Status: COMPLETED | OUTPATIENT
Start: 2024-12-03 | End: 2024-12-03

## 2024-12-03 RX ORDER — NIFEDIPINE 30 MG/1
30 TABLET, EXTENDED RELEASE ORAL 2 TIMES DAILY
Status: DISCONTINUED | OUTPATIENT
Start: 2024-12-03 | End: 2024-12-04

## 2024-12-03 RX ORDER — SEVELAMER CARBONATE 800 MG/1
2400 TABLET, FILM COATED ORAL
Status: DISCONTINUED | OUTPATIENT
Start: 2024-12-04 | End: 2024-12-07

## 2024-12-03 RX ORDER — HYDROCODONE BITARTRATE AND ACETAMINOPHEN 5; 325 MG/1; MG/1
1 TABLET ORAL ONCE
Status: COMPLETED | OUTPATIENT
Start: 2024-12-03 | End: 2024-12-03

## 2024-12-03 NOTE — PROGRESS NOTES
[facility] Hospitalist Progress Note     PCP: Adrian Small MD    Chief Complaint: follow-up   Follow up for: The primary encounter diagnosis was Weakness. Diagnoses of Stage 5 chronic kidney disease on chronic dialysis (HCC) and Nosebleed were also pertinent to this visit.    Overnight/Interim Events:      SUBJECTIVE:  Pt feels tired. Feels weak, legs not like normal. No chest pain, palpitations, shortness of breath, nausea, vomiting, abdominal pain.     OBJECTIVE:  Temp:  [97.9 °F (36.6 °C)-98.3 °F (36.8 °C)] 98.3 °F (36.8 °C)  Pulse:  [87-97] 97  Resp:  [10-21] 17  BP: (103-135)/(62-88) 135/82  SpO2:  [96 %-99 %] 99 %    Intake/Output:    Intake/Output Summary (Last 24 hours) at 12/3/2024 1146  Last data filed at 12/2/2024 1900  Gross per 24 hour   Intake 360 ml   Output --   Net 360 ml       Last 3 Weights   12/02/24 0442 242 lb 9.6 oz (110 kg)   12/01/24 0459 240 lb (108.9 kg)   12/01/24 0148 240 lb (108.9 kg)   11/19/24 0519 245 lb 9.5 oz (111.4 kg)   11/18/24 0525 240 lb 11.9 oz (109.2 kg)   11/16/24 1723 240 lb 15.4 oz (109.3 kg)   11/16/24 1236 240 lb (108.9 kg)   11/06/24 1743 240 lb (108.9 kg)   11/06/24 1155 240 lb (108.9 kg)       Exam    General: Alert, no distress, appears stated age.     Lungs:   Clear to auscultation bilaterally. Normal effort   Chest wall:  No tenderness or deformity   Heart:  Regular rate and rhythm, S1, S2 normal, no murmur,   Abdomen:   Soft, NT/ND, Bowel sounds normal. Norebound or guarding   Extremities: Extremities normal, atraumatic, no LE edema.   Skin: Skin color, texture, turgor normal. No rashes or lesions.    Neurologic: Moving all extremities spontaneously, no focal deficit appreciated      Data Review:       Labs:     Recent Labs   Lab 12/01/24 0210 12/02/24  0544 12/03/24  0558   WBC 6.8 4.6 5.3   HGB 8.9* 9.6* 9.7*   MCV 93.0 90.6 91.3   .0 230.0 255.0       Recent Labs   Lab 12/01/24 0210 12/02/24  0544 12/03/24  0558    136  136 137   K 4.2 4.0  4.0  4.4    98  98 100   CO2 21.0 26.0  26.0 25.0   BUN 80* 41*  41* 61*   CREATSERUM 12.46* 7.87*  7.87* 9.68*   CA 8.7 8.8  8.8 9.0   MG  --  2.1 2.3   PHOS  --  6.8* 7.5*   * 209*  209* 102*       Recent Labs   Lab 12/01/24  0210 12/02/24  0544 12/03/24  0558   ALT 11 9*  --    AST 19 17  --    ALB 4.4 4.2  4.2 3.9       Recent Labs   Lab 12/01/24  1726 12/01/24  2050 12/02/24  0441 12/02/24  2208 12/03/24  0532   PGLU 99 188* 103* 107* 132*       No results for input(s): \"TROP\" in the last 168 hours.      Meds:      NIFEdipine ER  30 mg Oral BID    buPROPion ER  150 mg Oral Daily    carvedilol  25 mg Oral BID with meals    fenofibrate micronized  134 mg Oral Nightly    FLUoxetine  40 mg Oral Daily    fluticasone propionate  1 spray Each Nare Daily    lamoTRIgine  100 mg Oral Daily    memantine  5 mg Oral BID    tamsulosin  0.4 mg Oral Daily    heparin  5,000 Units Subcutaneous Q8H MARTHA    ARIPiprazole  15 mg Oral Daily    sevelamer carbonate  800 mg Oral TID CC         acetaminophen    acetaminophen    sodium chloride **AND** albumin human    albuterol    acetaminophen **OR** [DISCONTINUED] HYDROcodone-acetaminophen **OR** [DISCONTINUED] HYDROcodone-acetaminophen       Assessment/Plan:     44 year old male with PMH including but not limited to ADD, bipolar, HTN, DVT/PE, hyperlipidemia, DM, CKD on dialysis, MGUS, cervical spondylosis, and TIA in 2021 who p/t  ED c weakness and missed HD.     Weakness  - likely 2/2 missed HD, stressed need to go to HD even if weak as weill only make him weaker; BP meds possibly contributing as well   - discussed narcotics can cause lethargy/drowsiness, agreeable to hold norco and just try tylenol   - PT/OT  - if further worsening or sxs, can consider neuro c/s, d/w neuro. Will wait to see sxs post holding BP meds   - has seen as o/p Vishal Hurt MD on 11/06/2024.   -CT head and MRI brain negative 11/16  -neuro consulted       ESRD  - per renal, plan HD T and  W, got Sun     Elevated trop  - has had before, chronic   - apprec cards recs        Hyperphosphatemia:  -- sevelamer 2400 TID AC       Non-obstructive minimal CAD on cath 4/24  H/o severe MR s/p MVR, redo complex robotically assisted MVR 2022  Chronic HFpEF  HTN  - Hold hydralazine, losartan, and nifedipine given lower BPs and orthostatsis and weakness; coreg ok. Follow BP and sxs, d/w Yesenia Rubin MD   - pt reports Low BPs at home 80/60s  - echo EF 60-65%, G2DD  - h/o cath 2023 with mild irregularities     Diaphoresis  - noted later in day, d/w MD Brnady   - check Blood cxs peripherally and from line, PCT, LA, CXR, UA (if makes urine), CXR; temp/WBC ok--> W/U currently neg  - drsg for line when came in not optimal     Anemia  Nose bleeds  - 2/2 ESRD, Hgb 8.9 to 9.6, follow     Finding of possible \"cystitis\" on imaging  - seen on previous CT as well  -Patient has had cystoscopy that looked okay in the past per his report.  He also has been treated for possible prostatitis.  He does seem to have more chronic discomfort in the suprapubic area.     LLQ lump- lipoma versus other subcutaneous nodule (?could it be from previous heparin injection site)  -No findings on imaging to suggest a deeper process.    - o/p f/u       # Cervical radiculopathy  -Pain control as needed  -Follow-up with spine surgeon        # Bipolar disorder  -Continue Abilify, bupropion, Lamictal   - follows c pyschiatrist, reports dose adjustment in meds     # Depression  -Continue fluoxetine        #COPD, chronic  -Continue home inhalers     Dispo: no discharge    Jhonny Rebolledo MD  [facility] Hospitalist  [phone]

## 2024-12-03 NOTE — PROGRESS NOTES
Cardiology Progress Note    Godwin Fonseca Patient Status:  Inpatient    1980 MRN [id number]   Location [facility] 8NE-A Attending Laila Ryder MD   Hosp Day # 0 PCP Adrian Small MD     IMPRESSIONS:  Chronic troponin elevation: flat trend up to 255. Lower than last admission which was >2300  Cath  for elevated troponin with mild irregularities  h/o severe MR s/p MVR , redo complex robotically assisted MVR (Bal2022)   Chronic HFpEF: euvolemic   HTN  Bipolar disease  ESRD on HD  DVT/PE   History TIA  Orthostatic positive yesterday. Fatigue and inability to stand due to weakness on admission            PLAN:  Given chronic elevations troponin and only mild \"irregularities\" at [facility]  for elevated troponin, will forgo ischemic eval given normal EF on echo without wall motion abnormalities on  .   Monitor for chest pains   Moderate LVH and mild to moderate MR  Tele   Given lower blood pressures than his baseline and + orthostatics with symptoms, will stop hydralazine and losartan for now and reduce nifedipine to 30 mg Bid. Continue coreg 25 mg BID. Goal BP in the short term to be liberalized to <150/90. Blood culture and infection workup per primary team         Subjective:  Orthostatic + this am     Objective:  /82 (BP Location: Right arm)   Pulse 97   Temp 98.3 °F (36.8 °C) (Oral)   Resp 17   Ht 6' 2\" (1.88 m)   Wt 242 lb 9.6 oz (110 kg)   SpO2 99%   BMI 31.15 kg/m²   Temp (24hrs), Av.1 °F (36.7 °C), Min:97.9 °F (36.6 °C), Max:98.3 °F (36.8 °C)      Intake/Output Summary (Last 24 hours) at 12/3/2024 0827  Last data filed at 2024 1900  Gross per 24 hour   Intake 910 ml   Output --   Net 910 ml     Wt Readings from Last 3 Encounters:   24 242 lb 9.6 oz (110 kg)   24 245 lb 9.5 oz (111.4 kg)   24 240 lb (108.9 kg)       General: Awake and alert; in no acute distress  Cardiac: Regular rate and regular rhythm; no murmurs/rubs/gallops are  appreciated  Lungs: Clear to auscultation bilaterally; no accessory muscle use  Abdomen: Soft, tender to touch but stable from prior   Extremities: No clubbing/cyanosis; moves all 4 extremities normally    Current Facility-Administered Medications   Medication Dose Route Frequency    acetaminophen (Tylenol Extra Strength) tab 500 mg  500 mg Oral Q6H PRN    acetaminophen (Tylenol Extra Strength) tab 1,000 mg  1,000 mg Oral Q6H PRN    sodium chloride 0.9 % IV bolus 100 mL  100 mL Intravenous Q30 Min PRN    And    albumin human (Albumin) 25% injection 25 g  25 g Intravenous PRN Dialysis    albuterol (Ventolin HFA) 108 (90 Base) MCG/ACT inhaler 2 puff  2 puff Inhalation Q6H PRN    buPROPion ER (Wellbutrin XL) 24 hr tab 150 mg  150 mg Oral Daily    carvedilol (Coreg) tab 25 mg  25 mg Oral BID with meals    fenofibrate micronized (Lofibra) cap 134 mg  134 mg Oral Nightly    FLUoxetine (PROzac) cap 40 mg  40 mg Oral Daily    fluticasone propionate (Flonase) 50 MCG/ACT nasal suspension 1 spray  1 spray Each Nare Daily    lamoTRIgine (LaMICtal) tab 100 mg  100 mg Oral Daily    losartan (Cozaar) tab 100 mg  100 mg Oral Daily    memantine (Namenda) tab 5 mg  5 mg Oral BID    NIFEdipine ER (Procardia-XL) 24 hr tab 60 mg  60 mg Oral BID    tamsulosin (Flomax) cap 0.4 mg  0.4 mg Oral Daily    heparin (Porcine) 5000 UNIT/ML injection 5,000 Units  5,000 Units Subcutaneous Q8H MARTHA    acetaminophen (Tylenol) tab 650 mg  650 mg Oral Q4H PRN    ARIPiprazole (Abilify) tab 15 mg  15 mg Oral Daily    sodium chloride 0.9 % IV bolus 100 mL  100 mL Intravenous Q30 Min PRN    And    albumin human (Albumin) 25% injection 25 g  25 g Intravenous PRN Dialysis    hydrALAZINE (Apresoline) tab 25 mg  25 mg Oral Q8H MARTHA    sevelamer carbonate (Renvela) tab 800 mg  800 mg Oral TID CC       Laboratory Data:  Lab Results   Component Value Date    WBC 5.3 12/03/2024    HGB 9.7 12/03/2024    HCT 28.5 12/03/2024    .0 12/03/2024       Lab Results    Component Value Date    INR 1.00 11/06/2024    INR 1.06 09/29/2024    INR 1.11 09/15/2024     Lab Results   Component Value Date     12/03/2024    K 4.4 12/03/2024     12/03/2024    CO2 25.0 12/03/2024    BUN 61 12/03/2024    CREATSERUM 9.68 12/03/2024     12/03/2024    CA 9.0 12/03/2024    MG 2.3 12/03/2024       Telemetry: No malignant tachyarrhythmias or bradyarrhythmias      Thank you for allowing our practice to participate in the care of your patient. Please do not hesitate to contact me if you have any questions.    Yesenia Rubin MD

## 2024-12-03 NOTE — PROGRESS NOTES
Assumed care at 0730  Patient alert, oriented x 4  VSS, RA throughout shift, NSR on tele  Pt stated of pain in back and 1x dose of Norco 5mg given  Pt stated of being tired after dialysis   Motivation needed to order food and get up to do orthos  Pt stated of dizziness and lightheaded when standing up  MRI ordered and will get done some time tomorrow per department  MRI screening form done  (-) BM this shift  Tolerating diet well  Call light within reach   Patient updated on plan of care

## 2024-12-03 NOTE — CONSULTS
[facility]  Neurology Consult Note    Godwin Fonseca Patient Status:  Observation    1980 MRN [id number]   Location [facility] 7NE-A Attending Jhonny Rebolledo MD   Hosp Day # 0 PCP Adrian Small MD       Reason for Consultation:   weakness    HPI:   Patient is a 44 year old male with a h/o htn, hl, dm2, cad, esrd on hd, copd, asthma, mitral valve prolapse s/p repair, spinal stenosis, anxiety do, bipolar do, adhd p/w weakness 12/3. Pt was admitted  for nosebleeds and weakness resulting in him missing HD. Reports generalized weakness from head down to feet over the last week, similar to prior episode in 2018 when he was told he had cauda equina. Denies any sensory changes or bowel incontinence.   Hospital course significant for troponemia, orthostatic hypotension, and progressive weakness.     Past Medical History:    Anxiety state    Arrhythmia    Asthma (Formerly [facility])    Attention deficit hyperactivity disorder (ADHD)    Back problem    Bipolar 1 disorder (Formerly [facility])    CKD (chronic kidney disease) stage 3, GFR 30-59 ml/min (Formerly [facility])    Dr Meeks    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Formerly [facility])    Congestive heart disease (Formerly [facility])    COPD (chronic obstructive pulmonary disease) (Formerly [facility])    Coronary atherosclerosis    Deep vein thrombosis (Formerly [facility])    at age 17 R/T cast    Depression    Diabetes (Formerly [facility])    Dialysis patient (Formerly [facility])    Diverticulosis of large intestine    Essential hypertension    3/21 echo: severe concentric LVH with normal EF and no MR or pHTN    Extrinsic asthma, unspecified    Heart attack (Formerly [facility])    - angiogram- no intervention    Heart valve disease    mitral valve repair in /    High blood pressure    High cholesterol    History of blood transfusion    History of mitral valve repair    Hyperlipidemia    Low back pain    tight and stiff after sweeping and mopping    LVH (left ventricular hypertrophy)    Migraines    Mixed hyperlipidemia     HDL 38 LDL 97 VLDL 57     Monoclonal gammopathy     IgG kappa     Muscle weakness    MVP (mitral valve prolapse)    Repair  at [place]; echoes as recently as 3/21 show mild or trivial MR and no stenosis    Neuropathy    Osteoarthritis    hip ,knees    Pneumonia due to organism    Pulmonary embolism (HCC)    Renal disorder    Stroke (HCC)    TIA (transient ischemic attack)    Initial history of left-sided weakness and slurred speech. (+) cocaine. MRI of the brain, CT angiogram of the head and neck, and 2D echo are all unremarkable.     TMJ (dislocation of temporomandibular joint)    Troponin level elevated    Trop 60 60 47 with TIA and no CP: Lexiscan negative with EF 51    Visual impairment       Past Surgical History:   Procedure Laterality Date    Av fistula revision, open Left     Cabg      Colonoscopy N/A 2023    Procedure: COLONOSCOPY;  Surgeon: Heath Vu MD;  Location:  ENDOSCOPY    Colonoscopy N/A 2023    Procedure: COLONOSCOPY with cold snare polypectomy and forcep polypectomy;  Surgeon: Ousmane Suarez MD;  Location:  ENDOSCOPY    Colonoscopy & polypectomy  2019    Egd  2019    Duodenitis. Biopsied. EUS for weight loss was negative    Heart surgery      Hernia surgery  2022    Dr Barnes    Laminectomy,>2 sgmt,lumbar  2018    L4-L5 Decomp Discectomy ROEM L4-L5    Mitralplasty w cp bypass      [place]: Repair    Repair rotator cuff,chronic Left     torn and had a ruptured bicep    Sinus surgery        Spine surgery procedure unlisted      Valve repair      mitral valve       Prescriptions Prior to Admission[1]  Allergies[2]  Social History     Socioeconomic History    Marital status:    Tobacco Use    Smoking status: Former     Current packs/day: 0.00     Average packs/day: 1 pack/day for 27.0 years (27.0 ttl pk-yrs)     Types: Cigarettes     Start date: 1995     Quit date: 2022     Years since quittin.7     Passive exposure: Never    Smokeless tobacco: Never   Vaping Use    Vaping status:  Never Used   Substance and Sexual Activity    Alcohol use: No    Drug use: No     Family History   Problem Relation Age of Onset    Hypertension Father     Alcohol and Other Disorders Associated Father     Substance Abuse Father         cocaine    Dementia Father     Cancer Father         lung    Diabetes Mother     Cancer Mother         multiple myeloma    Hypertension Mother     Anxiety Maternal Aunt     Depression Maternal Aunt     Anxiety Maternal Aunt     Depression Maternal Aunt     Bipolar Disorder Maternal Aunt     Diabetes Maternal Grandmother     Hypertension Maternal Grandmother     Cancer Maternal Grandfather         stomach cancer    Diabetes Maternal Grandfather     Hypertension Maternal Grandfather     Alcohol and Other Disorders Associated Maternal Grandfather     Hypertension Paternal Grandmother     Hypertension Paternal Grandfather     Cancer Sister         uterine and ovarian    Hypertension Sister     Cancer Maternal Uncle         lung    Cancer Paternal Aunt         throat       Current Meds:  Current Facility-Administered Medications   Medication Dose Route Frequency    NIFEdipine ER (Procardia-XL) 24 hr tab 30 mg  30 mg Oral BID    acetaminophen (Tylenol Extra Strength) tab 500 mg  500 mg Oral Q6H PRN    acetaminophen (Tylenol Extra Strength) tab 1,000 mg  1,000 mg Oral Q6H PRN    sodium chloride 0.9 % IV bolus 100 mL  100 mL Intravenous Q30 Min PRN    And    albumin human (Albumin) 25% injection 25 g  25 g Intravenous PRN Dialysis    albuterol (Ventolin HFA) 108 (90 Base) MCG/ACT inhaler 2 puff  2 puff Inhalation Q6H PRN    buPROPion ER (Wellbutrin XL) 24 hr tab 150 mg  150 mg Oral Daily    carvedilol (Coreg) tab 25 mg  25 mg Oral BID with meals    fenofibrate micronized (Lofibra) cap 134 mg  134 mg Oral Nightly    FLUoxetine (PROzac) cap 40 mg  40 mg Oral Daily    fluticasone propionate (Flonase) 50 MCG/ACT nasal suspension 1 spray  1 spray Each Nare Daily    lamoTRIgine (LaMICtal) tab 100  mg  100 mg Oral Daily    memantine (Namenda) tab 5 mg  5 mg Oral BID    tamsulosin (Flomax) cap 0.4 mg  0.4 mg Oral Daily    heparin (Porcine) 5000 UNIT/ML injection 5,000 Units  5,000 Units Subcutaneous Q8H MARTHA    acetaminophen (Tylenol) tab 650 mg  650 mg Oral Q4H PRN    ARIPiprazole (Abilify) tab 15 mg  15 mg Oral Daily    sevelamer carbonate (Renvela) tab 800 mg  800 mg Oral TID CC       Review of Systems:     Constitutional:    Denies unusual weight loss or weight gain, fever/chills or night sweats.  HEENT:                Denies changes in vision or difficulty swallowing.  Pulm:                    Denies dyspnea, cough, or sputum production  Cardiac:               Denies chest pain, palpitations or lower extremity edema.  GI:                         Denies constipation, heartburn or melena.  :                       Denies dysuria or hematuria.  Skin:                     Denies rashes or open areas.  Neuro:                  As per HPI    All other systems were reviewed and are negative.    Vitals:   Temp:  [97.5 °F (36.4 °C)-98.3 °F (36.8 °C)] 97.5 °F (36.4 °C)  Pulse:  [] 108  Resp:  [10-21] 21  BP: (103-135)/(62-94) 124/94  SpO2:  [97 %-99 %] 99 %  Body mass index is 31.15 kg/m².    Intake/Output:    Intake/Output Summary (Last 24 hours) at 12/3/2024 1350  Last data filed at 12/2/2024 1900  Gross per 24 hour   Intake 360 ml   Output --   Net 360 ml       Physical Examination:   General- No acute distress  CV- RRR  Resp- CTA Bilat  Neuro-  Mental status- awake and alert, regards and follows commands, oriented x3, speech fluent  Cranial nerves- pupils equally round and reactive to light, extraocular muscles intact, face symmetric, visual fields full  Motor- 5/5 throughout except LLE 4/5, DTRs 0-1/4 throughout  Sens-  Intact to light touch    Diagnostics:   XR CHEST AP PORTABLE  (CPT=71045)    Result Date: 12/2/2024  CONCLUSION:  See above.   LOCATION:  [place] [id number]      Dictated by (CST): Smith Radnle MD on  12/02/2024 at 7:46 PM     Finalized by (CST): Smith Randle MD on 12/02/2024 at 7:48 PM        Lab Review     Recent Labs   Lab 12/01/24 0210 12/02/24  0544 12/03/24  0558    136  136 137   K 4.2 4.0  4.0 4.4    98  98 100   CO2 21.0 26.0  26.0 25.0   * 209*  209* 102*   BUN 80* 41*  41* 61*   CREATSERUM 12.46* 7.87*  7.87* 9.68*     Recent Labs   Lab 12/01/24 0210 12/02/24  0544 12/03/24  0558   WBC 6.8 4.6 5.3   HGB 8.9* 9.6* 9.7*   .0 230.0 255.0       Assesment/Plan:     Neuro:  Weakness- differential includes neurogenic (eg spinal stenosis, peripheral nerve disorder etc) vs non-neurogenic (eg non-organic) vs toxic/metabolic/cardiogenic.   Given h/o spinal stenosis will check mri spine for further eval to start.   If negative may consider csf analysis with LP and/or outpt emg for further eval.     Goals of the Day: neurochecks, mri spine   A total of 45 minutes of critical care time (exclusive of billable procedures) was administered to manage and/or prevent neurologic instability. This involved direct patient intervention, complex decision making, and/or extensive discussions with the patient, family, and clinical staff.    Thank you for allowing me to participate in the care of this patient.     Carolyn Weiss MD, [facility]  Medical Director of System Neurosciences  Chief, Section of Neurocritical Care  [facility] [place]           [1]   Medications Prior to Admission   Medication Sig Dispense Refill Last Dose/Taking    HYDROcodone-acetaminophen 5-325 MG Oral Tab Take 1 tablet by mouth every 4 (four) hours as needed. 15 tablet 0 Past Week    hydrocortisone 25 MG Rectal Suppos Place 1 suppository (25 mg total) rectally 2 (two) times daily. 60 suppository 0 11/30/2024 Bedtime    VYVANSE 60 MG Oral Cap Take 1 capsule (60 mg total) by mouth every morning.   12/1/2024 Morning    lamoTRIgine 100 MG Oral Tab Take 1 tablet (100 mg total) by mouth daily.    12/1/2024 Morning    hydrALAZINE 50 MG Oral Tab Take 1 tablet (50 mg total) by mouth every 8 (eight) hours. 90 tablet 0 12/1/2024 Morning    memantine 5 MG Oral Tab Take 1 tablet (5 mg total) by mouth 2 (two) times daily.   12/1/2024 Morning    albuterol 108 (90 Base) MCG/ACT Inhalation Aero Soln Inhale 2 puffs into the lungs every 6 (six) hours as needed for Wheezing.   11/30/2024 Morning    tamsulosin 0.4 MG Oral Cap Take 1 capsule (0.4 mg total) by mouth daily.   12/1/2024 Morning    ARIPiprazole 15 MG Oral Tab Take 1 tablet (15 mg total) by mouth daily.   12/1/2024 Morning    buPROPion  MG Oral Tablet 24 Hr Take 1 tablet (150 mg total) by mouth daily.   12/1/2024 Morning    FLUoxetine HCl 40 MG Oral Cap Take 1 capsule (40 mg total) by mouth daily. 7 capsule 0 12/1/2024 Morning    RENVELA 800 MG Oral Tab Take 1 tablet (800 mg total) by mouth 3 (three) times daily with meals.   12/1/2024 Morning    losartan 100 MG Oral Tab Take 1 tablet (100 mg total) by mouth daily. Hold if systolic blood pressure <130   12/1/2024 Morning    NIFEdipine ER 60 MG Oral Tablet 24 Hr Take 1 tablet (60 mg total) by mouth in the morning and 1 tablet (60 mg total) before bedtime. Hold if systolic blood pressure <130.   12/1/2024 Morning    carvedilol 25 MG Oral Tab Take 1 tablet (25 mg total) by mouth in the morning and 1 tablet (25 mg total) in the evening. Take with meals. 60 tablet 6 12/1/2024 Morning    Fenofibrate 134 MG Oral Cap Take 1 capsule by mouth nightly. 90 capsule 1 12/1/2024 Morning    Fluticasone Propionate 50 MCG/ACT Nasal Suspension SPRAY ONCE INTO EACH NOSTRIL BID PRN 15.8 mL 0 12/1/2024 Morning   [2]   Allergies  Allergen Reactions    Hydrochlorothiazide RASH and HIVES

## 2024-12-04 ENCOUNTER — APPOINTMENT (OUTPATIENT)
Facility: HOSPITAL | Age: 44
End: 2024-12-04
Attending: HOSPITALIST
Payer: MEDICARE

## 2024-12-04 LAB
ALBUMIN SERPL-MCNC: 4.6 G/DL (ref 3.2–4.8)
ANION GAP SERPL CALC-SCNC: 14 MMOL/L (ref 0–18)
BASOPHILS # BLD AUTO: 0.12 X10(3) UL (ref 0–0.2)
BASOPHILS NFR BLD AUTO: 2.2 %
BUN BLD-MCNC: 44 MG/DL (ref 9–23)
CALCIUM BLD-MCNC: 10.2 MG/DL (ref 8.7–10.4)
CHLORIDE SERPL-SCNC: 94 MMOL/L (ref 98–112)
CO2 SERPL-SCNC: 30 MMOL/L (ref 21–32)
CREAT BLD-MCNC: 8.4 MG/DL
EGFRCR SERPLBLD CKD-EPI 2021: 7 ML/MIN/1.73M2 (ref 60–?)
EOSINOPHIL # BLD AUTO: 0.4 X10(3) UL (ref 0–0.7)
EOSINOPHIL NFR BLD AUTO: 7.4 %
ERYTHROCYTE [DISTWIDTH] IN BLOOD BY AUTOMATED COUNT: 13.3 %
GLUCOSE BLD-MCNC: 108 MG/DL (ref 70–99)
GLUCOSE BLD-MCNC: 142 MG/DL (ref 70–99)
GLUCOSE BLD-MCNC: 87 MG/DL (ref 70–99)
HCT VFR BLD AUTO: 34.9 %
HGB BLD-MCNC: 11.9 G/DL
IMM GRANULOCYTES # BLD AUTO: 0.01 X10(3) UL (ref 0–1)
IMM GRANULOCYTES NFR BLD: 0.2 %
LYMPHOCYTES # BLD AUTO: 1.7 X10(3) UL (ref 1–4)
LYMPHOCYTES NFR BLD AUTO: 31.3 %
MAGNESIUM SERPL-MCNC: 2.4 MG/DL (ref 1.6–2.6)
MCH RBC QN AUTO: 31.3 PG (ref 26–34)
MCHC RBC AUTO-ENTMCNC: 34.1 G/DL (ref 31–37)
MCV RBC AUTO: 91.8 FL
MONOCYTES # BLD AUTO: 0.98 X10(3) UL (ref 0.1–1)
MONOCYTES NFR BLD AUTO: 18 %
NEUTROPHILS # BLD AUTO: 2.23 X10 (3) UL (ref 1.5–7.7)
NEUTROPHILS # BLD AUTO: 2.23 X10(3) UL (ref 1.5–7.7)
NEUTROPHILS NFR BLD AUTO: 40.9 %
OSMOLALITY SERPL CALC.SUM OF ELEC: 297 MOSM/KG (ref 275–295)
PHOSPHATE SERPL-MCNC: 8 MG/DL (ref 2.4–5.1)
PLATELET # BLD AUTO: 314 10(3)UL (ref 150–450)
POTASSIUM SERPL-SCNC: 4.8 MMOL/L (ref 3.5–5.1)
RBC # BLD AUTO: 3.8 X10(6)UL
SODIUM SERPL-SCNC: 138 MMOL/L (ref 136–145)
WBC # BLD AUTO: 5.4 X10(3) UL (ref 4–11)

## 2024-12-04 PROCEDURE — 99232 SBSQ HOSP IP/OBS MODERATE 35: CPT

## 2024-12-04 RX ORDER — HYDROCODONE BITARTRATE AND ACETAMINOPHEN 5; 325 MG/1; MG/1
1 TABLET ORAL ONCE
Status: COMPLETED | OUTPATIENT
Start: 2024-12-04 | End: 2024-12-04

## 2024-12-04 RX ORDER — CARVEDILOL 6.25 MG/1
6.25 TABLET ORAL 2 TIMES DAILY WITH MEALS
Status: DISCONTINUED | OUTPATIENT
Start: 2024-12-04 | End: 2024-12-05

## 2024-12-04 RX ORDER — ONDANSETRON 2 MG/ML
4 INJECTION INTRAMUSCULAR; INTRAVENOUS EVERY 6 HOURS PRN
Status: DISCONTINUED | OUTPATIENT
Start: 2024-12-04 | End: 2024-12-07

## 2024-12-04 RX ORDER — ONDANSETRON 4 MG/1
4 TABLET, ORALLY DISINTEGRATING ORAL EVERY 6 HOURS PRN
Status: DISCONTINUED | OUTPATIENT
Start: 2024-12-04 | End: 2024-12-07

## 2024-12-04 NOTE — PROGRESS NOTES
[facility] Hospitalist Progress Note     PCP: Adrian Small MD    Chief Complaint: follow-up   Follow up for: The primary encounter diagnosis was Weakness. Diagnoses of Stage 5 chronic kidney disease on chronic dialysis (HCC) and Nosebleed were also pertinent to this visit.    Overnight/Interim Events:      SUBJECTIVE:  Pt feels tired. Feels weak, legs not like normal. No chest pain, palpitations, shortness of breath, nausea, vomiting, abdominal pain.     OBJECTIVE:  Temp:  [97.8 °F (36.6 °C)-98.3 °F (36.8 °C)] 97.9 °F (36.6 °C)  Pulse:  [] 90  Resp:  [13-22] 13  BP: ()/() 124/97  SpO2:  [97 %-98 %] 98 %    Intake/Output:  No intake or output data in the 24 hours ending 12/04/24 1606      Last 3 Weights   12/03/24 1616 236 lb 6.4 oz (107.2 kg)   12/02/24 0442 242 lb 9.6 oz (110 kg)   12/01/24 0459 240 lb (108.9 kg)   12/01/24 0148 240 lb (108.9 kg)   12/03/24 1611 236 lb 12.8 oz (107.4 kg)   11/19/24 0519 245 lb 9.5 oz (111.4 kg)   11/18/24 0525 240 lb 11.9 oz (109.2 kg)   11/16/24 1723 240 lb 15.4 oz (109.3 kg)   11/16/24 1236 240 lb (108.9 kg)       Exam    General: Alert, no distress, appears stated age.     Lungs:   Clear to auscultation bilaterally. Normal effort, no crackles, no wheezing   Heart:  Regular rate and rhythm, S1, S2 normal, no murmur,   Abdomen:   Soft, NT/ND, Bowel sounds normal. Norebound or guarding   Extremities: Extremities normal, atraumatic, no LE edema.   Neurologic: Moving all extremities spontaneously, no focal deficit appreciated      Data Review:       Labs:     Recent Labs   Lab 12/01/24  0210 12/02/24  0544 12/03/24  0558 12/04/24  0644   WBC 6.8 4.6 5.3 5.4   HGB 8.9* 9.6* 9.7* 11.9*   MCV 93.0 90.6 91.3 91.8   .0 230.0 255.0 314.0       Recent Labs   Lab 12/01/24  0210 12/02/24  0544 12/03/24  0558 12/04/24  0644    136  136 137 138   K 4.2 4.0  4.0 4.4 4.8    98  98 100 94*   CO2 21.0 26.0  26.0 25.0 30.0   BUN 80* 41*  41* 61* 44*    CREATSERUM 12.46* 7.87*  7.87* 9.68* 8.40*   CA 8.7 8.8  8.8 9.0 10.2   MG  --  2.1 2.3 2.4   PHOS  --  6.8* 7.5* 8.0*   * 209*  209* 102* 87       Recent Labs   Lab 12/01/24  0210 12/02/24  0544 12/03/24  0558 12/04/24  0644   ALT 11 9*  --   --    AST 19 17  --   --    ALB 4.4 4.2  4.2 3.9 4.6       Recent Labs   Lab 12/03/24  0532 12/03/24  1148 12/03/24  1612 12/04/24  1154 12/04/24  1540   PGLU 132* 114* 196* 142* 108*       No results for input(s): \"TROP\" in the last 168 hours.      Meds:      carvedilol  6.25 mg Oral BID with meals    sevelamer carbonate  2,400 mg Oral TID CC    buPROPion ER  150 mg Oral Daily    fenofibrate micronized  134 mg Oral Nightly    FLUoxetine  40 mg Oral Daily    fluticasone propionate  1 spray Each Nare Daily    lamoTRIgine  100 mg Oral Daily    memantine  5 mg Oral BID    tamsulosin  0.4 mg Oral Daily    heparin  5,000 Units Subcutaneous Q8H MARTHA    ARIPiprazole  15 mg Oral Daily         ondansetron    ondansetron    acetaminophen    acetaminophen    sodium chloride **AND** albumin human    albuterol    acetaminophen **OR** [DISCONTINUED] HYDROcodone-acetaminophen **OR** [DISCONTINUED] HYDROcodone-acetaminophen       Assessment/Plan:     44 year old male with PMH including but not limited to ADD, bipolar, HTN, DVT/PE, hyperlipidemia, DM, CKD on dialysis, MGUS, cervical spondylosis, and TIA in 2021 who p/t  ED c weakness and missed HD.     Weakness  - likely 2/2 missed HD, stressed need to go to HD even if weak as weill only make him weaker; BP meds possibly contributing as well   - discussed narcotics can cause lethargy/drowsiness, agreeable to hold norco and just try tylenol   - PT/OT--> home therapy   - has seen as o/p Vishal Hurt MD on 11/06/2024.   -CT head and MRI brain negative 11/16  -neuro following --> mri spine ordered     ESRD  - per renal, plan HD T and W, got Sun     Elevated trop  - has had before, chronic   - apprec cards recs       Hyperphosphatemia:  -- sevelamer 2400 TID AC     Non-obstructive minimal CAD on cath 4/24  H/o severe MR s/p MVR, redo complex robotically assisted MVR 2022  Chronic HFpEF  HTN  - Hold hydralazine, losartan, and nifedipine given lower BPs and orthostatsis and weakness; coreg ok. Follow BP and sxs, d/w Yesenia Rubin MD   - pt reports Low BPs at home 80/60s  - echo EF 60-65%, G2DD  - h/o cath 2023 with mild irregularities     Diaphoresis  - noted later in day, d/w MD Brandy   - check Blood cxs peripherally and from line, PCT, LA, CXR, UA (if makes urine), CXR; temp/WBC ok--> W/U currently neg  - drsg for line when came in not optimal     Anemia  Nose bleeds  - 2/2 ESRD  - trend     Finding of possible \"cystitis\" on imaging  - seen on previous CT as well  -Patient has had cystoscopy that looked okay in the past per his report.  He also has been treated for possible prostatitis.  He does seem to have more chronic discomfort in the suprapubic area.     LLQ lump- lipoma versus other subcutaneous nodule (?could it be from previous heparin injection site)  -No findings on imaging to suggest a deeper process.    - o/p f/u       # Cervical radiculopathy  -Pain control as needed  -Follow-up with spine surgeon  -MRI spine ordered per neuro      # Bipolar disorder  -Continue Abilify, bupropion, Lamictal   - follows c pyschiatrist, reports dose adjustment in meds     # Depression  -Continue fluoxetine      #COPD, chronic  -Continue home inhalers    #DVT Proph  -heparin sq     Dispo: no discharge    Jhonny Rebolledo MD  [facility] Hospitalist  [phone]

## 2024-12-04 NOTE — PHYSICAL THERAPY NOTE
PHYSICAL THERAPY TREATMENT NOTE - INPATIENT    Room Number: 7616/7616-A     Session: 1     Number of Visits to Meet Established Goals: 2    Presenting Problem: weakness, missed dialysis,  Co-Morbidities : ADD, bipolar, HTN, DVT/PE, DM, CKD, MGUS, TIA 2021    PHYSICAL THERAPY ASSESSMENT   Patient demonstrates limited progress this session, goals  remain in progress.      Orthostatic BP:   Supine - 139/106  Sitting - 124/98  Standing - 100/69 (Pt reported lightheadedness/fatigue)    Patient is requiring modified independent and supervision as a result of the following impairments: medical status and BP management .     Patient continues to function near baseline with bed mobility, transfers, and gait.  Next session anticipate patient to progress gait and standing prolonged periods.  Physical Therapy will continue to follow patient for duration of hospitalization.    Patient continues to benefit from continued skilled PT services: at discharge to promote prior level of function and safety with additional support and return home with home health PT.    PLAN DURING HOSPITALIZATION  Nursing Mobility Recommendation : 1 Assist  PT Device Recommendation: Rolling walker  PT Treatment Plan: Bed mobility;Body mechanics;Endurance;Energy conservation;Patient education;Family education;Gait training;Balance training;Transfer training;Strengthening;Neuromuscular re-educate  Frequency (Obs): 3x/week     CURRENT GOALS     Goal #1 Patient is able to demonstrate supine - sit EOB @ level: independent      Goal #2 Patient is able to demonstrate transfers EOB to/from Chair/Wheelchair at assistance level: supervision      Goal #3 Patient is able to ambulate 150 feet with assist device:  LRAD  at assistance level: supervision      Goal #4 Pt is able to ascend and descend stairs with supervision   Goal #5     Goal #6     Goal Comments: Goals established on 12/2/2024 12/4/2024 all goals ongoing    SUBJECTIVE  \"Yeah I usually am pretty  independent\"    OBJECTIVE  Precautions: Bed/chair alarm    WEIGHT BEARING RESTRICTION     PAIN ASSESSMENT   Ratin  Location: denied       BALANCE                                                                                                                       Static Sitting: Good  Dynamic Sitting: Fair +           Static Standing: Fair -  Dynamic Standing: Poor +    ACTIVITY TOLERANCE                         O2 WALK       AM-PAC '6-Clicks' INPATIENT SHORT FORM - BASIC MOBILITY  How much difficulty does the patient currently have...  Patient Difficulty: Turning over in bed (including adjusting bedclothes, sheets and blankets)?: None   Patient Difficulty: Sitting down on and standing up from a chair with arms (e.g., wheelchair, bedside commode, etc.): None   Patient Difficulty: Moving from lying on back to sitting on the side of the bed?: None   How much help from another person does the patient currently need...   Help from Another: Moving to and from a bed to a chair (including a wheelchair)?: A Little   Help from Another: Need to walk in hospital room?: A Little   Help from Another: Climbing 3-5 steps with a railing?: A Little     AM-PAC Score:  Raw Score: 21   Approx Degree of Impairment: 28.97%   Standardized Score (AM-PAC Scale): 50.25   CMS Modifier (G-Code): CJ    FUNCTIONAL ABILITY STATUS  Gait Assessment   Functional Mobility/Gait Assessment  Gait Assistance: Not tested  Distance (ft): 0 (side steps towards HOB)    Skilled Therapy Provided    Bed Mobility:  Rolling: Mod I   Supine<>Sit: Mod I   Sit<>Supine: Mod I     Transfer Mobility:  Sit<>Stand: Mod I   Stand<>Sit: Mod I   Gait: NT    Therapist's Comments: BP obtained - Pt reported \"too weak\" to walk. Encouraged up to chair for meals and amb to bathroom for toileting vs. Usage of urinal.       THERAPEUTIC EXERCISES  Lower Extremity Ankle pumps     Upper Extremity Elbow flex/ext     Position Sitting     Repetitions   Verbally encouraged   Sets         Patient End of Session: In bed;Needs met;Call light within reach;RN aware of session/findings;All patient questions and concerns addressed;Hospital anti-slip socks    PT Session Time: 10 minutes  Gait Trainin minutes  Therapeutic Activity: 10 minutes  Therapeutic Exercise: 0 minutes   Neuromuscular Re-education: 0 minutes

## 2024-12-04 NOTE — PROGRESS NOTES
[facility]   part of [facility]    Nephrology Progress Note    Godwin Fonseca Attending:  Yuriy Forrest MD     Cc: ESRD    SUBJECTIVE     Feels weak and tired  Cards and neuro on consult     PHYSICAL EXAM   Vital signs: BP (!) 124/97 (BP Location: Right arm)   Pulse 90   Temp 97.9 °F (36.6 °C) (Oral)   Resp 13   Ht 6' 2\" (1.88 m)   Wt 236 lb 6.4 oz (107.2 kg)   SpO2 98%   BMI 30.35 kg/m²   Temp (24hrs), Av.9 °F (36.6 °C), Min:97.8 °F (36.6 °C), Max:98.3 °F (36.8 °C)     No intake or output data in the 24 hours ending 24 1519    Wt Readings from Last 3 Encounters:   24 236 lb 6.4 oz (107.2 kg)   24 236 lb 12.8 oz (107.4 kg)   24 245 lb 9.5 oz (111.4 kg)     General: NAD  HEENT: NCAT, EOMI, MMM  Neck: Supple   Cardiac: Regular rate and rhythm   Lungs: CTAB  Abdomen: Soft, non-tender  Extremities: No edema  Neurologic: No asterxis  Skin: Warm and dry, no rashes     MEDS     Current Facility-Administered Medications   Medication Dose Route Frequency    ondansetron (Zofran) 4 MG/2ML injection 4 mg  4 mg Intravenous Q6H PRN    ondansetron (Zofran-ODT) disintegrating tab 4 mg  4 mg Oral Q6H PRN    carvedilol (Coreg) tab 6.25 mg  6.25 mg Oral BID with meals    sevelamer carbonate (Renvela) tab 2,400 mg  2,400 mg Oral TID CC    acetaminophen (Tylenol Extra Strength) tab 500 mg  500 mg Oral Q6H PRN    acetaminophen (Tylenol Extra Strength) tab 1,000 mg  1,000 mg Oral Q6H PRN    sodium chloride 0.9 % IV bolus 100 mL  100 mL Intravenous Q30 Min PRN    And    albumin human (Albumin) 25% injection 25 g  25 g Intravenous PRN Dialysis    albuterol (Ventolin HFA) 108 (90 Base) MCG/ACT inhaler 2 puff  2 puff Inhalation Q6H PRN    buPROPion ER (Wellbutrin XL) 24 hr tab 150 mg  150 mg Oral Daily    fenofibrate micronized (Lofibra) cap 134 mg  134 mg Oral Nightly    FLUoxetine (PROzac) cap 40 mg  40 mg Oral Daily    fluticasone propionate (Flonase) 50 MCG/ACT nasal suspension 1 spray  1  spray Each Nare Daily    lamoTRIgine (LaMICtal) tab 100 mg  100 mg Oral Daily    memantine (Namenda) tab 5 mg  5 mg Oral BID    tamsulosin (Flomax) cap 0.4 mg  0.4 mg Oral Daily    heparin (Porcine) 5000 UNIT/ML injection 5,000 Units  5,000 Units Subcutaneous Q8H MARTHA    acetaminophen (Tylenol) tab 650 mg  650 mg Oral Q4H PRN    ARIPiprazole (Abilify) tab 15 mg  15 mg Oral Daily       LABS     Lab Results   Component Value Date    WBC 5.4 12/04/2024    HGB 11.9 12/04/2024    HCT 34.9 12/04/2024    .0 12/04/2024    CREATSERUM 8.40 12/04/2024    BUN 44 12/04/2024     12/04/2024    K 4.8 12/04/2024    CL 94 12/04/2024    CO2 30.0 12/04/2024    GLU 87 12/04/2024    CA 10.2 12/04/2024    ALB 4.6 12/04/2024    MG 2.4 12/04/2024    PHOS 8.0 12/04/2024    PGLU 142 12/04/2024       IMAGING   All imaging studies personally reviewed.    XR CHEST AP PORTABLE  (CPT=71045)   Final Result   PROCEDURE:  XR CHEST AP PORTABLE  (CPT=71045)       TECHNIQUE:  AP chest radiograph was obtained.       COMPARISON:  [facility], XR, XR CHEST AP PORTABLE  (CPT=71045),    11/06/2024, 12:20 PM.       INDICATIONS:  Diaphoresis        PATIENT STATED HISTORY: (As transcribed by Technologist)  Patient offered    no additional history at this time.             FINDINGS:  Support devices appear overall stable.  Small right pleural    effusion is favored.  A background of mild vascular congestion and volume    overload is likely present.  Consolidation at the right lung base is    difficult to entirely exclude.  If there    is persistent concern then consider follow-up imaging.                         =====   CONCLUSION:  See above.           LOCATION:  [id number]                       Dictated by (CST): Smith Randle MD on 12/02/2024 at 7:46 PM        Finalized by (CST): Smith Randle MD on 12/02/2024 at 7:48 PM         MRI CERVICAL+THORACIC+LUMBAR SPINE  (CPT=72141/75045/19206)    (Results Pending)         ASSESSMENT & PLAN   44 year old male with  history of ESRD on iHD MWF via [facility] CVC, LUE AVF, hyperaldosteronism, DM, bipolar disorder, recurrent GI bleed, pneumonia recently presented with weakness and nose bleeds    ESRD:  -- sp HD Sunday due to missed Friday.   -- sp HD 12/3/24. BPs low. Hold off on HD today unless needed. Plan for next HD Thursday likely pending BPs  -- Will use catheter here (has AVF but unclear what size needles they are using as outpt).    -- continue MWF HD schedule  -- getting MRI, if using pavan let nephrology know   -- avoid class 1 gadolinium agents     Anemia:  -- epo with HD if BP controlled     Hyperphosphatemia:  -- sevelamer 2400 TID AC  -- low phos diet - decrease nuts     HTN:  -- continue home meds     Diaphoresis  -- pending = blood cultures and cultures from line    Thank you for allowing me to participate in the care of this patient. Please do not hesitate to call with questions or concerns.       Samaria Nava MD  [facility] Nephrology

## 2024-12-04 NOTE — PROGRESS NOTES
[facility]   part of [facility]    Nephrology Progress Note    Godwin Fonseca Attending:  Yuriy Forrest MD     Cc: ESRD    SUBJECTIVE     Sp HD, BPs low, feels tired  +orthostatics prior  Weak, neuro consulted     PHYSICAL EXAM   Vital signs: BP (!) 84/63 (BP Location: Right arm)   Pulse 119   Temp 97.5 °F (36.4 °C) (Oral)   Resp 17   Ht 6' 2\" (1.88 m)   Wt 236 lb 6.4 oz (107.2 kg)   SpO2 98%   BMI 30.35 kg/m²   Temp (24hrs), Av °F (36.7 °C), Min:97.5 °F (36.4 °C), Max:98.3 °F (36.8 °C)     No intake or output data in the 24 hours ending 24 1933    Wt Readings from Last 3 Encounters:   24 236 lb 6.4 oz (107.2 kg)   24 236 lb 12.8 oz (107.4 kg)   24 245 lb 9.5 oz (111.4 kg)     General: NAD  HEENT: NCAT, EOMI, MMM  Neck: Supple   Cardiac: Regular rate and rhythm   Lungs: CTAB  Abdomen: Soft, non-tender  Extremities: No edema  Neurologic: No asterxis  Skin: Warm and dry, no rashes     MEDS     Current Facility-Administered Medications   Medication Dose Route Frequency    NIFEdipine ER (Procardia-XL) 24 hr tab 30 mg  30 mg Oral BID    acetaminophen (Tylenol Extra Strength) tab 500 mg  500 mg Oral Q6H PRN    acetaminophen (Tylenol Extra Strength) tab 1,000 mg  1,000 mg Oral Q6H PRN    sodium chloride 0.9 % IV bolus 100 mL  100 mL Intravenous Q30 Min PRN    And    albumin human (Albumin) 25% injection 25 g  25 g Intravenous PRN Dialysis    albuterol (Ventolin HFA) 108 (90 Base) MCG/ACT inhaler 2 puff  2 puff Inhalation Q6H PRN    buPROPion ER (Wellbutrin XL) 24 hr tab 150 mg  150 mg Oral Daily    carvedilol (Coreg) tab 25 mg  25 mg Oral BID with meals    fenofibrate micronized (Lofibra) cap 134 mg  134 mg Oral Nightly    FLUoxetine (PROzac) cap 40 mg  40 mg Oral Daily    fluticasone propionate (Flonase) 50 MCG/ACT nasal suspension 1 spray  1 spray Each Nare Daily    lamoTRIgine (LaMICtal) tab 100 mg  100 mg Oral Daily    memantine (Namenda) tab 5 mg  5 mg Oral BID     tamsulosin (Flomax) cap 0.4 mg  0.4 mg Oral Daily    heparin (Porcine) 5000 UNIT/ML injection 5,000 Units  5,000 Units Subcutaneous Q8H MARTHA    acetaminophen (Tylenol) tab 650 mg  650 mg Oral Q4H PRN    ARIPiprazole (Abilify) tab 15 mg  15 mg Oral Daily    sevelamer carbonate (Renvela) tab 800 mg  800 mg Oral TID CC       LABS     Lab Results   Component Value Date    WBC 5.3 12/03/2024    HGB 9.7 12/03/2024    HCT 28.5 12/03/2024    .0 12/03/2024    CREATSERUM 9.68 12/03/2024    BUN 61 12/03/2024     12/03/2024    K 4.4 12/03/2024     12/03/2024    CO2 25.0 12/03/2024     12/03/2024    CA 9.0 12/03/2024    ALB 3.9 12/03/2024    MG 2.3 12/03/2024    PHOS 7.5 12/03/2024    PGLU 196 12/03/2024       IMAGING   All imaging studies personally reviewed.    XR CHEST AP PORTABLE  (CPT=71045)   Final Result   PROCEDURE:  XR CHEST AP PORTABLE  (CPT=71045)       TECHNIQUE:  AP chest radiograph was obtained.       COMPARISON:  [facility], XR, XR CHEST AP PORTABLE  (CPT=71045),    11/06/2024, 12:20 PM.       INDICATIONS:  Diaphoresis        PATIENT STATED HISTORY: (As transcribed by Technologist)  Patient offered    no additional history at this time.             FINDINGS:  Support devices appear overall stable.  Small right pleural    effusion is favored.  A background of mild vascular congestion and volume    overload is likely present.  Consolidation at the right lung base is    difficult to entirely exclude.  If there    is persistent concern then consider follow-up imaging.                         =====   CONCLUSION:  See above.           LOCATION:  [id number]                       Dictated by (CST): Smith Randle MD on 12/02/2024 at 7:46 PM        Finalized by (CST): Smith Randle MD on 12/02/2024 at 7:48 PM         MRI CERVICAL+THORACIC+LUMBAR SPINE  (CPT=72141/00053/18690)    (Results Pending)         ASSESSMENT & PLAN   44 year old male with history of ESRD on iHD MWF via RIJ CVC, LUE AVF,  hyperaldosteronism, DM, bipolar disorder, recurrent GI bleed, pneumonia recently presented with weakness and nose bleeds    ESRD:  -- sp HD Sunday due to missed Friday.   -- sp HD today. BPs low. Hold off on HD tomorrow unless needed   -- Will use catheter here (has AVF but unclear what size needles they are using as outpt).    -- continue MWF HD schedule  -- getting MRI, if using pavan let nephrology know   -- avoid class 1 gadolinium agents     Anemia:  -- epo with HD if BP controlled     Hyperphosphatemia:  -- sevelamer 2400 TID AC  -- low phos diet - decrease nuts     HTN:  -- continue home meds     Diaphoresis  -- pending = blood cultures and cultures from line    Thank you for allowing me to participate in the care of this patient. Please do not hesitate to call with questions or concerns.       Samaria Nava MD  [facility] Nephrology

## 2024-12-04 NOTE — PLAN OF CARE
Assumed care 1900, 12/3, NOC. 3.5L dialyzed, confirmed no contrast ordered with MRI (no gadolinium), tolerating medications, tele in place, non-narcotic pain relief

## 2024-12-04 NOTE — PROGRESS NOTES
Cardiology Progress Note    Gdowin Fonseca Patient Status:  Inpatient    1980 MRN [id number]   Location [facility] 8NE-A Attending Laila Ryder MD   Hosp Day # 0 PCP Adrian Small MD     IMPRESSIONS:  Chronic troponin elevation: flat trend up to 255. Lower than last admission which was >2300  Cath  for elevated troponin with mild irregularities  h/o severe MR s/p MVR , redo complex robotically assisted MVR (Danay )   Chronic HFpEF: euvolemic   HTN nit now with relative hypotension and multiple positive orthostatic blood pressures   Bipolar disease  ESRD on HD  DVT/PE   History TIA          PLAN:  Given chronic elevations troponin and only mild \"irregularities\" at [facility]  for elevated troponin, will forgo ischemic eval given normal EF on echo without wall motion abnormalities on  .   Continues to have relative hypotension and + orthostatic issues. Usually his blood pressure runs elevated. Will stop all anti-HTN for now and decrease coreg to 6.25 from 25 mg BID and remeasure orthostatics today. Neuro and infectious workup ongoing.  Goal BP in the short term to be liberalized to <150/90.   Monitor for chest pains   Moderate LVH and mild to moderate MR  Tele           Subjective:  Continues to be weak with lower BP     Objective:  /89 (BP Location: Right arm)   Pulse 103   Temp 97.8 °F (36.6 °C) (Oral)   Resp 16   Ht 6' 2\" (1.88 m)   Wt 236 lb 6.4 oz (107.2 kg)   SpO2 98%   BMI 30.35 kg/m²   Temp (24hrs), Av.9 °F (36.6 °C), Min:97.5 °F (36.4 °C), Max:98.3 °F (36.8 °C)    No intake or output data in the 24 hours ending 24 0858    Wt Readings from Last 3 Encounters:   24 236 lb 6.4 oz (107.2 kg)   24 236 lb 12.8 oz (107.4 kg)   24 245 lb 9.5 oz (111.4 kg)       General: Awake and alert; in no acute distress  Cardiac: Regular rate and regular rhythm; no murmurs/rubs/gallops are appreciated  Lungs: Clear to auscultation bilaterally; no accessory  muscle use  Abdomen: Soft, tender to touch but stable from prior   Extremities: No clubbing/cyanosis; moves all 4 extremities normally    Current Facility-Administered Medications   Medication Dose Route Frequency    ondansetron (Zofran) 4 MG/2ML injection 4 mg  4 mg Intravenous Q6H PRN    ondansetron (Zofran-ODT) disintegrating tab 4 mg  4 mg Oral Q6H PRN    NIFEdipine ER (Procardia-XL) 24 hr tab 30 mg  30 mg Oral BID    sevelamer carbonate (Renvela) tab 2,400 mg  2,400 mg Oral TID CC    acetaminophen (Tylenol Extra Strength) tab 500 mg  500 mg Oral Q6H PRN    acetaminophen (Tylenol Extra Strength) tab 1,000 mg  1,000 mg Oral Q6H PRN    sodium chloride 0.9 % IV bolus 100 mL  100 mL Intravenous Q30 Min PRN    And    albumin human (Albumin) 25% injection 25 g  25 g Intravenous PRN Dialysis    albuterol (Ventolin HFA) 108 (90 Base) MCG/ACT inhaler 2 puff  2 puff Inhalation Q6H PRN    buPROPion ER (Wellbutrin XL) 24 hr tab 150 mg  150 mg Oral Daily    carvedilol (Coreg) tab 25 mg  25 mg Oral BID with meals    fenofibrate micronized (Lofibra) cap 134 mg  134 mg Oral Nightly    FLUoxetine (PROzac) cap 40 mg  40 mg Oral Daily    fluticasone propionate (Flonase) 50 MCG/ACT nasal suspension 1 spray  1 spray Each Nare Daily    lamoTRIgine (LaMICtal) tab 100 mg  100 mg Oral Daily    memantine (Namenda) tab 5 mg  5 mg Oral BID    tamsulosin (Flomax) cap 0.4 mg  0.4 mg Oral Daily    heparin (Porcine) 5000 UNIT/ML injection 5,000 Units  5,000 Units Subcutaneous Q8H MARTHA    acetaminophen (Tylenol) tab 650 mg  650 mg Oral Q4H PRN    ARIPiprazole (Abilify) tab 15 mg  15 mg Oral Daily       Laboratory Data:  Lab Results   Component Value Date    WBC 5.4 12/04/2024    HGB 11.9 12/04/2024    HCT 34.9 12/04/2024    .0 12/04/2024       Lab Results   Component Value Date    INR 1.00 11/06/2024    INR 1.06 09/29/2024    INR 1.11 09/15/2024     Lab Results   Component Value Date     12/04/2024    K 4.8 12/04/2024    CL 94  12/04/2024    CO2 30.0 12/04/2024    BUN 44 12/04/2024    CREATSERUM 8.40 12/04/2024    GLU 87 12/04/2024    CA 10.2 12/04/2024    MG 2.4 12/04/2024       Telemetry: No malignant tachyarrhythmias or bradyarrhythmias      Thank you for allowing our practice to participate in the care of your patient. Please do not hesitate to contact me if you have any questions.    Yesenia Rubin MD

## 2024-12-04 NOTE — CM/SW NOTE
PT marisa completed 12/2. Current anticipated therapy need is for home health. Per PT note:    HOME SITUATION  Type of Home: House  Home Layout: Two level           Stairs to Bedroom: 13    Railing: Yes    Lives With: Parent(s)    Drives: Yes   Patient Regularly Uses: Reading glasses       Prior Level of [place]: Pt typically indep with ADLs and mobility. Notes having AD such as RW and cane, however has not been using. Notes it has been harder to get around at home recently. Denies any falls. Notes his right side is typically weaker. 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 Also has a cousin and aunt that have been helping as needed.     HH referrals sent via [employer]. CM/SW will follow up w/ pt and provide list of agencies, to secure services prior to DC.    NAVID FelizN, CMSRN    [id number]

## 2024-12-05 LAB
ALBUMIN SERPL-MCNC: 4.1 G/DL (ref 3.2–4.8)
ANION GAP SERPL CALC-SCNC: 15 MMOL/L (ref 0–18)
BASOPHILS # BLD AUTO: 0.11 X10(3) UL (ref 0–0.2)
BASOPHILS NFR BLD AUTO: 2.1 %
BUN BLD-MCNC: 67 MG/DL (ref 9–23)
CALCIUM BLD-MCNC: 10.1 MG/DL (ref 8.7–10.4)
CHLORIDE SERPL-SCNC: 92 MMOL/L (ref 98–112)
CO2 SERPL-SCNC: 28 MMOL/L (ref 21–32)
CREAT BLD-MCNC: 10.65 MG/DL
EGFRCR SERPLBLD CKD-EPI 2021: 5 ML/MIN/1.73M2 (ref 60–?)
EOSINOPHIL # BLD AUTO: 0.52 X10(3) UL (ref 0–0.7)
EOSINOPHIL NFR BLD AUTO: 9.9 %
ERYTHROCYTE [DISTWIDTH] IN BLOOD BY AUTOMATED COUNT: 13.2 %
GLUCOSE BLD-MCNC: 101 MG/DL (ref 70–99)
GLUCOSE BLD-MCNC: 105 MG/DL (ref 70–99)
GLUCOSE BLD-MCNC: 112 MG/DL (ref 70–99)
GLUCOSE BLD-MCNC: 116 MG/DL (ref 70–99)
HCT VFR BLD AUTO: 30.7 %
HGB BLD-MCNC: 10.9 G/DL
IMM GRANULOCYTES # BLD AUTO: 0.01 X10(3) UL (ref 0–1)
IMM GRANULOCYTES NFR BLD: 0.2 %
LYMPHOCYTES # BLD AUTO: 1.32 X10(3) UL (ref 1–4)
LYMPHOCYTES NFR BLD AUTO: 25.1 %
MAGNESIUM SERPL-MCNC: 2.6 MG/DL (ref 1.6–2.6)
MCH RBC QN AUTO: 31.4 PG (ref 26–34)
MCHC RBC AUTO-ENTMCNC: 35.5 G/DL (ref 31–37)
MCV RBC AUTO: 88.5 FL
MONOCYTES # BLD AUTO: 0.83 X10(3) UL (ref 0.1–1)
MONOCYTES NFR BLD AUTO: 15.8 %
NEUTROPHILS # BLD AUTO: 2.46 X10 (3) UL (ref 1.5–7.7)
NEUTROPHILS # BLD AUTO: 2.46 X10(3) UL (ref 1.5–7.7)
NEUTROPHILS NFR BLD AUTO: 46.9 %
OSMOLALITY SERPL CALC.SUM OF ELEC: 300 MOSM/KG (ref 275–295)
PHOSPHATE SERPL-MCNC: 8.7 MG/DL (ref 2.4–5.1)
PLATELET # BLD AUTO: 286 10(3)UL (ref 150–450)
POTASSIUM SERPL-SCNC: 4.1 MMOL/L (ref 3.5–5.1)
RBC # BLD AUTO: 3.47 X10(6)UL
SODIUM SERPL-SCNC: 135 MMOL/L (ref 136–145)
WBC # BLD AUTO: 5.3 X10(3) UL (ref 4–11)

## 2024-12-05 PROCEDURE — 99232 SBSQ HOSP IP/OBS MODERATE 35: CPT

## 2024-12-05 RX ORDER — GABAPENTIN 100 MG/1
100 CAPSULE ORAL NIGHTLY
Status: DISCONTINUED | OUTPATIENT
Start: 2024-12-05 | End: 2024-12-06

## 2024-12-05 RX ORDER — ALBUMIN (HUMAN) 12.5 G/50ML
25 SOLUTION INTRAVENOUS
Status: DISCONTINUED | OUTPATIENT
Start: 2024-12-05 | End: 2024-12-07

## 2024-12-05 RX ORDER — HEPARIN SODIUM 1000 [USP'U]/ML
1.5 INJECTION, SOLUTION INTRAVENOUS; SUBCUTANEOUS
Status: DISCONTINUED | OUTPATIENT
Start: 2024-12-06 | End: 2024-12-07

## 2024-12-05 RX ORDER — CARVEDILOL 12.5 MG/1
12.5 TABLET ORAL 2 TIMES DAILY WITH MEALS
Status: DISCONTINUED | OUTPATIENT
Start: 2024-12-05 | End: 2024-12-06

## 2024-12-05 NOTE — PROGRESS NOTES
[facility] Hospitalist Progress Note     PCP: Adrian Small MD    Chief Complaint: follow-up   Follow up for: The primary encounter diagnosis was Weakness. Diagnoses of Stage 5 chronic kidney disease on chronic dialysis (HCC) and Nosebleed were also pertinent to this visit.    Overnight/Interim Events:      SUBJECTIVE:  Pt feels tired. Feels weak, legs not like normal. No chest pain, palpitations, shortness of breath, nausea, vomiting, abdominal pain.     OBJECTIVE:  Temp:  [97.7 °F (36.5 °C)-98.2 °F (36.8 °C)] 98 °F (36.7 °C)  Pulse:  [] 88  Resp:  [13-21] 18  BP: (100-146)/() 146/98  SpO2:  [98 %] 98 %    Intake/Output:    Intake/Output Summary (Last 24 hours) at 12/5/2024 0803  Last data filed at 12/4/2024 1800  Gross per 24 hour   Intake 700 ml   Output 0 ml   Net 700 ml         Last 3 Weights   12/03/24 1616 236 lb 6.4 oz (107.2 kg)   12/02/24 0442 242 lb 9.6 oz (110 kg)   12/01/24 0459 240 lb (108.9 kg)   12/01/24 0148 240 lb (108.9 kg)   12/03/24 1611 236 lb 12.8 oz (107.4 kg)   11/19/24 0519 245 lb 9.5 oz (111.4 kg)   11/18/24 0525 240 lb 11.9 oz (109.2 kg)   11/16/24 1723 240 lb 15.4 oz (109.3 kg)   11/16/24 1236 240 lb (108.9 kg)       Exam    General: Alert, no distress, appears stated age.     Lungs:   Clear to auscultation bilaterally. Normal effort, no crackles, no wheezing   Heart:  Regular rate and rhythm, S1, S2 normal, no murmur,   Abdomen:   Soft, NT/ND, Bowel sounds normal. Norebound or guarding   Extremities: Extremities normal, atraumatic, no LE edema.   Neurologic: Moving all extremities spontaneously, no focal deficit appreciated      Data Review:       Labs:     Recent Labs   Lab 12/01/24  0210 12/02/24  0544 12/03/24  0558 12/04/24  0644   WBC 6.8 4.6 5.3 5.4   HGB 8.9* 9.6* 9.7* 11.9*   MCV 93.0 90.6 91.3 91.8   .0 230.0 255.0 314.0       Recent Labs   Lab 12/01/24 0210 12/02/24  0544 12/03/24  0558 12/04/24  0644    136  136 137 138   K 4.2 4.0  4.0 4.4 4.8   CL  103 98  98 100 94*   CO2 21.0 26.0  26.0 25.0 30.0   BUN 80* 41*  41* 61* 44*   CREATSERUM 12.46* 7.87*  7.87* 9.68* 8.40*   CA 8.7 8.8  8.8 9.0 10.2   MG  --  2.1 2.3 2.4   PHOS  --  6.8* 7.5* 8.0*   * 209*  209* 102* 87       Recent Labs   Lab 12/01/24  0210 12/02/24  0544 12/03/24  0558 12/04/24  0644   ALT 11 9*  --   --    AST 19 17  --   --    ALB 4.4 4.2  4.2 3.9 4.6       Recent Labs   Lab 12/03/24  0532 12/03/24  1148 12/03/24  1612 12/04/24  1154 12/04/24  1540   PGLU 132* 114* 196* 142* 108*       No results for input(s): \"TROP\" in the last 168 hours.      Meds:      carvedilol  6.25 mg Oral BID with meals    sevelamer carbonate  2,400 mg Oral TID CC    buPROPion ER  150 mg Oral Daily    fenofibrate micronized  134 mg Oral Nightly    FLUoxetine  40 mg Oral Daily    fluticasone propionate  1 spray Each Nare Daily    lamoTRIgine  100 mg Oral Daily    memantine  5 mg Oral BID    tamsulosin  0.4 mg Oral Daily    heparin  5,000 Units Subcutaneous Q8H MARTHA    ARIPiprazole  15 mg Oral Daily         ondansetron    ondansetron    acetaminophen    acetaminophen    albuterol    acetaminophen **OR** [DISCONTINUED] HYDROcodone-acetaminophen **OR** [DISCONTINUED] HYDROcodone-acetaminophen       Assessment/Plan:     44 year old male with PMH including but not limited to ADD, bipolar, HTN, DVT/PE, hyperlipidemia, DM, CKD on dialysis, MGUS, cervical spondylosis, and TIA in 2021 who p/t EH ED c weakness and missed HD.     Weakness  - likely 2/2 missed HD, stressed need to go to HD even if weak as weill only make him weaker; BP meds possibly contributing as well   - discussed narcotics can cause lethargy/drowsiness, agreeable to hold norco and just try tylenol   - PT/OT--> home therapy   - has seen as o/p Vishal Hurt MD on 11/06/2024.   -CT head and MRI brain negative 11/16  -neuro following --> mri spine ordered, pending     ESRD  - per renal, plan HD T and W, got Sun     Elevated trop  - has had before,  chronic   - apprec cards recs      Hyperphosphatemia:  -- sevelamer 2400 TID AC     Non-obstructive minimal CAD on cath 4/24  H/o severe MR s/p MVR, redo complex robotically assisted MVR 2022  Chronic HFpEF  HTN  - Hold hydralazine, losartan, and nifedipine given lower BPs and orthostatsis and weakness; coreg ok, dose lowered. Follow BP and sxs, d/w Yesenia Rubin MD   - pt reports Low BPs at home 80/60s  - echo EF 60-65%, G2DD  - h/o cath 2023 with mild irregularities     Diaphoresis  - noted later in day, d/w MD Brandy   - check Blood cxs peripherally and from line, PCT, LA, CXR, UA (if makes urine), CXR; temp/WBC ok--> W/U currently neg  - drsg for line when came in not optimal     Anemia  Nose bleeds  - 2/2 ESRD  - trend     Finding of possible \"cystitis\" on imaging  - seen on previous CT as well  -Patient has had cystoscopy that looked okay in the past per his report.  He also has been treated for possible prostatitis.  He does seem to have more chronic discomfort in the suprapubic area.     LLQ lump- lipoma versus other subcutaneous nodule (?could it be from previous heparin injection site)  -No findings on imaging to suggest a deeper process.    - o/p f/u       # Cervical radiculopathy  -Pain control as needed  -Follow-up with spine surgeon  -MRI spine ordered per neuro      # Bipolar disorder  -Continue Abilify, bupropion, Lamictal   - follows c pyschiatrist, reports dose adjustment in meds     # Depression  -Continue fluoxetine      #COPD, chronic  -Continue home inhalers    #DVT Proph  -heparin sq     Dispo: no discharge    Jhonny Rebolledo MD  [facility]  [phone]

## 2024-12-05 NOTE — OCCUPATIONAL THERAPY NOTE
OCCUPATIONAL THERAPY TREATMENT NOTE - INPATIENT     Room Number: 7616/7616-A  Session: ***   Number of Visits to Meet Established Goals: 7    Presenting Problem: weakness    ASSESSMENT   Patient demonstrates {OT/PT Demonstrates Progress:12055} progress this session, goals {OT/PT Goal Progress:12244}.    Patient continues to function {OT/PT Functioning Baseline:52650} baseline with {OT ADLs:40893}.   Contributing factors to remaining limitations include {OT Limitations/Impairments:14931}.  Next session anticipate patient to progress {OT ADLs:87853}.  Occupational Therapy will continue to follow patient for duration of hospitalization.    Patient continues to benefit from continued skilled OT services: {OT Services:84271}.     History: ***    WEIGHT BEARING RESTRICTION       Recommendations for nursing staff:   Transfers: ***  Toileting location: ***    TREATMENT SESSION:  Patient Start of Session: ***    FUNCTIONAL TRANSFER ASSESSMENT  Sit to Stand: Edge of Bed; Chair  Edge of Bed: Contact Guard Assist  Chair: Contact Guard Assist    BED MOBILITY  Rolling: Supervision  Supine to Sit : Supervision  Sit to Supine (OT): Supervision    BALANCE ASSESSMENT  Static Sitting: Independent  Static Standing: Stand-by Assist    FUNCTIONAL ADL ASSESSMENT  Eating: Modified Independent  Grooming Seated: Supervision  LB Dressing Seated: Minimal Assist    ACTIVITY TOLERANCE: ***     Heart Rate Source: Monitor           BP Method: Automatic  Patient Position: Sitting    O2 SATURATIONS       EDUCATION PROVIDED  Patient Education : Role of Occupational Therapy; Plan of Care; Discharge Recommendations; Functional Transfer Techniques; Fall Prevention; Posture/Positioning; Edema Reduction; Energy Conservation; Proper Body Mechanics  Patient's Response to Education: Verbalized Understanding; Requires Further Education    Equipment used: ***  Demonstrates functional use, Would benefit from additional trial ***     Exercises:    Exercises  Repetitions Comments   Scapular elevation     Scapular retraction     Shoulder rolls     Shoulder flexion     Shoulder abduction     Shoulder internal/external rotation     Forward punch     Elbow flexion     Elbow extension     Forearm pronation/supination     Wrist flexion/extension     Gross grasp/fist pumps     Ankle pumps     Knee extension     Marching       UPPER EXTREMITY  ROM: within functional limits {OT UE ROM Exception to WFLs:5379:::0}  Strength: is within functional limits {OT UE Strength Exception to WFLs:4400:::0}  Coordination  Gross motor: ***  Fine motor: ***  Sensation: {Sensation:3868:::0}  Tone: ***    Therapist comments: ***  Patient End of Session: Up in chair;With  staff;Needs met;Call light within reach;RN aware of session/findings;All patient questions and concerns addressed;Alarm set;Discussed recommendations with /    SUBJECTIVE  ***    PAIN ASSESSMENT  Rating: Unable to rate  Location: legs, back  Management Techniques: Activity promotion;Repositioning;Relaxation;Nurse notified     OBJECTIVE  Precautions: Bed/chair alarm    AM-PAC ‘6-Clicks’ Inpatient Daily Activity Short Form  -   Putting on and taking off regular lower body clothing?: A Little  -   Bathing (including washing, rinsing, drying)?: A Little  -   Toileting, which includes using toilet, bedpan or urinal? : A Little  -   Putting on and taking off regular upper body clothing?: None  -   Taking care of personal grooming such as brushing teeth?: None  -   Eating meals?: None    AM-PAC Score:  Score: 21  Approx Degree of Impairment: 32.79%  Standardized Score (AM-PAC Scale): 44.27    PLAN  OT Device Recommendations: TBD  OT Treatment Plan: Balance activities;Energy conservation/work simplification techniques;ADL training;Functional transfer training;UE strengthening/ROM;Endurance training;Patient/Family education;Patient/Family training;Cognitive reorientation;Equipment eval/education;Compensatory  technique education  Rehab Potential : Good  Frequency: 3-5x/week    OT Goals: ***    OT Session Time: *** minutes  Self-Care Home Management: *** minutes  Therapeutic Activity: *** minutes  Neuromuscular Re-education: *** minutes  Therapeutic Exercise: *** minutes  Cognitive Skills: *** minutes  Sensory Integrative: *** minutes  Orthotic Management and Training: *** minutes  Can add/delete any of these

## 2024-12-05 NOTE — CM/SW NOTE
12/05/24 1600   Discharge Needs   Anticipated D/C needs Home health care   Choice of Post-Acute Provider   Informed patient of right to choose their preferred provider Yes   List of appropriate post-acute services provided to patient/family with quality data Yes     Met w/pt and provided list of available HH providers.  Pt states he will review and notify SW/CM of choice    / to remain available for support and/or discharge planning.     Heike Savage MBA MSN, RN CTL/  [id number]

## 2024-12-05 NOTE — PROGRESS NOTES
[facility]  [facility] Neurology Progress Note    Godwin Fonseca Patient Status:  Observation    1980 MRN [id number]   Location [facility] 7NE-A Attending Jhonny Rebolledo MD   Hosp Day # 0 PCP Adrian Small MD     CC: BLE weakness    Subjective: Patient seen and assessed for a follow up visit today, sleeping in bed but easily aroused to voice, pleasant. Reports having neck pain, weakness to BLE still, denies intermittent spasms down both legs today. Denies s/s numbness/tingling/paresthesia anywhere on the body, no reports of nausea, vomiting, chest pain or sob. No new focal neurological deficit noted.            MEDICATIONS:  No current outpatient medications on file.     Current Facility-Administered Medications   Medication Dose Route Frequency    ondansetron (Zofran) 4 MG/2ML injection 4 mg  4 mg Intravenous Q6H PRN    ondansetron (Zofran-ODT) disintegrating tab 4 mg  4 mg Oral Q6H PRN    carvedilol (Coreg) tab 6.25 mg  6.25 mg Oral BID with meals    sevelamer carbonate (Renvela) tab 2,400 mg  2,400 mg Oral TID CC    acetaminophen (Tylenol Extra Strength) tab 500 mg  500 mg Oral Q6H PRN    acetaminophen (Tylenol Extra Strength) tab 1,000 mg  1,000 mg Oral Q6H PRN    albuterol (Ventolin HFA) 108 (90 Base) MCG/ACT inhaler 2 puff  2 puff Inhalation Q6H PRN    buPROPion ER (Wellbutrin XL) 24 hr tab 150 mg  150 mg Oral Daily    fenofibrate micronized (Lofibra) cap 134 mg  134 mg Oral Nightly    FLUoxetine (PROzac) cap 40 mg  40 mg Oral Daily    fluticasone propionate (Flonase) 50 MCG/ACT nasal suspension 1 spray  1 spray Each Nare Daily    lamoTRIgine (LaMICtal) tab 100 mg  100 mg Oral Daily    memantine (Namenda) tab 5 mg  5 mg Oral BID    tamsulosin (Flomax) cap 0.4 mg  0.4 mg Oral Daily    heparin (Porcine) 5000 UNIT/ML injection 5,000 Units  5,000 Units Subcutaneous Q8H MARTHA    acetaminophen (Tylenol) tab 650 mg  650 mg Oral Q4H PRN    ARIPiprazole (Abilify) tab 15 mg  15 mg Oral Daily       REVIEW OF  SYSTEMS:  A 10-point system was reviewed.  Pertinent positives and negatives are noted in HPI.      PHYSICAL EXAMINATION:  VITAL SIGNS: /89 (BP Location: Right arm)   Pulse 80   Temp 97.4 °F (36.3 °C) (Temporal)   Resp 19   Ht 74\"   Wt 236 lb 6.4 oz (107.2 kg)   SpO2 98%   BMI 30.35 kg/m²   GENERAL:  Patient is a 44 year old male in no acute distress.  HEENT:  Normocephalic, atraumatic  ABD: Soft, non tender  SKIN: Warm, dry, no rashes    NEUROLOGICAL:   Mental status: Oriented to person, place, situation and time   Speech: Fluent, no obvious aphasia or dysarthria  Memory and comprehension: Intact   Cranial Nerves: VFF, PERRL 3mm brisk, EOMI, no nystagmus, facial sensation intact, face symmetric, tongue midline, shoulder shrug equal, remainder CN intact  Motor: No drift, no focal arm weakness bilaterally, BLE weakness and motor exam 4/5, DTRs 1/4 throughout    Sensory: Intact to light touch bilaterally  Coordination: finger-to-nose test normal  Gait: Deferred    Imaging/Diagnostics:  XR CHEST AP PORTABLE  (CPT=71045)    Result Date: 12/2/2024  CONCLUSION:  See above.   LOCATION:  [id number]      Dictated by (CST): Smith Randle MD on 12/02/2024 at 7:46 PM     Finalized by (CST): Smith aRndle MD on 12/02/2024 at 7:48 PM          Labs:  Recent Labs   Lab 12/02/24  0544 12/03/24  0558 12/04/24  0644 12/05/24  1318   RBC 3.09* 3.12* 3.80* 3.47*   HGB 9.6* 9.7* 11.9* 10.9*   HCT 28.0* 28.5* 34.9* 30.7*   MCV 90.6 91.3 91.8 88.5   MCH 31.1 31.1 31.3 31.4   MCHC 34.3 34.0 34.1 35.5   RDW 13.6 13.4 13.3 13.2   NEPRELIM 3.18  --  2.23 2.46   WBC 4.6 5.3 5.4 5.3   .0 255.0 314.0 286.0         Recent Labs   Lab 12/03/24  0558 12/04/24  0644 12/05/24  1318   * 87 112*   BUN 61* 44* 67*   CREATSERUM 9.68* 8.40* 10.65*   EGFRCR 6* 7* 5*   CA 9.0 10.2 10.1    138 135*   K 4.4 4.8 4.1    94* 92*   CO2 25.0 30.0 28.0       Pre-morbid mRS 1-2      Assessment and Plan:    Weakness - pt presented with BLE  weakness, thus missed his HD, episode of nose bleed. Differential include neurogenic (eg spinal stenosis, peripheral nerve disorder etc) vs toxic/metabolic/cardiogenic.   - Considering pt hx of spinal stenosis - MRI cervical/thoracic/lumbar ordered, switched to STAT for further eval and pending. Per nursing to be done today.  - If negative may consider CSF analysis with LP and/or outpatient imaging for further evaluation.   2. ESRD  - pt on HD, Nephrology following.  3. Troponemia and Hypotension - Cardiology following pt.     4. Discussed plan of care with pt at bed side with positive verbal understanding. All questions answered, case discussed with nursing and Dr. Weiss. Will continue to follow.        Is this a shared or split note between Advanced Practice Provider and Physician? No     PILAR Toribio  [facility]  12/5/2024, 2:10 PM  [facility] # [id number]

## 2024-12-05 NOTE — PLAN OF CARE
Assumed care at approx 1930.  A/Ox4, RA, NSR on tele; VSS.  Back pain addressed w/ PRN Tylenol.  R extended IV flushing, but no longer drawing; Status changed back to lab draw. Stat lab paged.  Plan for HD pending blood pressure and Nephro clearance.  Plan for MRI w/o contrast.  Safety and comfort measures in place.  Pt updated on POC.

## 2024-12-05 NOTE — PROGRESS NOTES
Cardiology Progress Note    Godwin Fonseca Patient Status:  Inpatient    1980 MRN [id number]   Location [facility] 8NE-A Attending Laila Ryder MD   Hosp Day # 0 PCP Adrian Small MD     IMPRESSIONS:  Chronic troponin elevation: flat trend up to 255. Lower than last admission which was >2300  Cath  for elevated troponin with mild irregularities  h/o severe MR s/p MVR , redo complex robotically assisted MVR ([facility]2022)   Chronic HFpEF: euvolemic   HTN nit now with relative hypotension and multiple positive orthostatic blood pressures. Improved today   Bipolar disease  ESRD on HD  DVT/PE   History TIA          PLAN:  Given chronic elevations troponin and only mild \"irregularities\" at [facility]  for elevated troponin, will forgo ischemic eval given normal EF on echo without wall motion abnormalities on  .   BP improved off medications. Will increase coreg to 12.5 mg BID. Weakness is mildly improved. MRI spine is pending    Monitor for chest pains   Moderate LVH and mild to moderate MR  Tele       Subjective:  BP is improved today   Still with weakness but improved     Objective:  /89 (BP Location: Right arm)   Pulse 80   Temp 97.4 °F (36.3 °C) (Temporal)   Resp 19   Ht 6' 2\" (1.88 m)   Wt 236 lb 6.4 oz (107.2 kg)   SpO2 98%   BMI 30.35 kg/m²   Temp (24hrs), Av.8 °F (36.6 °C), Min:97.4 °F (36.3 °C), Max:98.2 °F (36.8 °C)      Intake/Output Summary (Last 24 hours) at 2024 1512  Last data filed at 2024 0900  Gross per 24 hour   Intake 240 ml   Output 0 ml   Net 240 ml       Wt Readings from Last 3 Encounters:   24 236 lb 6.4 oz (107.2 kg)   24 236 lb 12.8 oz (107.4 kg)   24 245 lb 9.5 oz (111.4 kg)       General: Awake and alert; in no acute distress  Cardiac: Regular rate and regular rhythm; no murmurs/rubs/gallops are appreciated  Lungs: Clear to auscultation bilaterally; no accessory muscle use  Abdomen: Soft, tender to touch but stable from  prior   Extremities: No clubbing/cyanosis; moves all 4 extremities normally    Current Facility-Administered Medications   Medication Dose Route Frequency    carvedilol (Coreg) tab 12.5 mg  12.5 mg Oral BID with meals    ondansetron (Zofran) 4 MG/2ML injection 4 mg  4 mg Intravenous Q6H PRN    ondansetron (Zofran-ODT) disintegrating tab 4 mg  4 mg Oral Q6H PRN    sevelamer carbonate (Renvela) tab 2,400 mg  2,400 mg Oral TID CC    acetaminophen (Tylenol Extra Strength) tab 500 mg  500 mg Oral Q6H PRN    acetaminophen (Tylenol Extra Strength) tab 1,000 mg  1,000 mg Oral Q6H PRN    albuterol (Ventolin HFA) 108 (90 Base) MCG/ACT inhaler 2 puff  2 puff Inhalation Q6H PRN    buPROPion ER (Wellbutrin XL) 24 hr tab 150 mg  150 mg Oral Daily    fenofibrate micronized (Lofibra) cap 134 mg  134 mg Oral Nightly    FLUoxetine (PROzac) cap 40 mg  40 mg Oral Daily    fluticasone propionate (Flonase) 50 MCG/ACT nasal suspension 1 spray  1 spray Each Nare Daily    lamoTRIgine (LaMICtal) tab 100 mg  100 mg Oral Daily    memantine (Namenda) tab 5 mg  5 mg Oral BID    tamsulosin (Flomax) cap 0.4 mg  0.4 mg Oral Daily    heparin (Porcine) 5000 UNIT/ML injection 5,000 Units  5,000 Units Subcutaneous Q8H MARTHA    acetaminophen (Tylenol) tab 650 mg  650 mg Oral Q4H PRN    ARIPiprazole (Abilify) tab 15 mg  15 mg Oral Daily       Laboratory Data:  Lab Results   Component Value Date    WBC 5.3 12/05/2024    HGB 10.9 12/05/2024    HCT 30.7 12/05/2024    .0 12/05/2024       Lab Results   Component Value Date    INR 1.00 11/06/2024    INR 1.06 09/29/2024    INR 1.11 09/15/2024     Lab Results   Component Value Date     12/05/2024    K 4.1 12/05/2024    CL 92 12/05/2024    CO2 28.0 12/05/2024    BUN 67 12/05/2024    CREATSERUM 10.65 12/05/2024     12/05/2024    CA 10.1 12/05/2024    MG 2.6 12/05/2024       Telemetry: No malignant tachyarrhythmias or bradyarrhythmias      Thank you for allowing our practice to participate in the  care of your patient. Please do not hesitate to contact me if you have any questions.    Yesenia Rubin MD

## 2024-12-05 NOTE — PROGRESS NOTES
Cardiology Progress Note    Godwin Fonseca Patient Status:  Inpatient    1980 MRN [id number]   Location [facility] 8NE-A Attending Laila Ryder MD   Hosp Day # 0 PCP Adrian Small MD     IMPRESSIONS:  Chronic troponin elevation: flat trend up to 255. Lower than last admission which was >2300  Cath  for elevated troponin with mild irregularities  h/o severe MR s/p MVR , redo complex robotically assisted MVR (Danay )   Chronic HFpEF: euvolemic   HTN nit now with relative hypotension and multiple positive orthostatic blood pressures   Bipolar disease  ESRD on HD  DVT/PE   History TIA          PLAN:  Given chronic elevations troponin and only mild \"irregularities\" at [facility]  for elevated troponin, will forgo ischemic eval given normal EF on echo without wall motion abnormalities on  .   Continues to have relative hypotension and + orthostatic issues. Usually his blood pressure runs elevated. Will stop all anti-HTN for now and decrease coreg to 6.25 from 25 mg BID and remeasure orthostatics today. Neuro and infectious workup ongoing.  Goal BP in the short term to be liberalized to <150/90.   Monitor for chest pains   Moderate LVH and mild to moderate MR  Tele           Subjective:  Continues to be weak with lower BP     Objective:  BP (!) 146/98 (BP Location: Right arm)   Pulse 88   Temp 98 °F (36.7 °C) (Oral)   Resp 18   Ht 6' 2\" (1.88 m)   Wt 236 lb 6.4 oz (107.2 kg)   SpO2 98%   BMI 30.35 kg/m²   Temp (24hrs), Av °F (36.7 °C), Min:97.7 °F (36.5 °C), Max:98.2 °F (36.8 °C)      Intake/Output Summary (Last 24 hours) at 2024 0823  Last data filed at 2024 1800  Gross per 24 hour   Intake 700 ml   Output 0 ml   Net 700 ml       Wt Readings from Last 3 Encounters:   24 236 lb 6.4 oz (107.2 kg)   24 236 lb 12.8 oz (107.4 kg)   24 245 lb 9.5 oz (111.4 kg)       General: Awake and alert; in no acute distress  Cardiac: Regular rate and regular rhythm;  no murmurs/rubs/gallops are appreciated  Lungs: Clear to auscultation bilaterally; no accessory muscle use  Abdomen: Soft, tender to touch but stable from prior   Extremities: No clubbing/cyanosis; moves all 4 extremities normally    Current Facility-Administered Medications   Medication Dose Route Frequency    ondansetron (Zofran) 4 MG/2ML injection 4 mg  4 mg Intravenous Q6H PRN    ondansetron (Zofran-ODT) disintegrating tab 4 mg  4 mg Oral Q6H PRN    carvedilol (Coreg) tab 6.25 mg  6.25 mg Oral BID with meals    sevelamer carbonate (Renvela) tab 2,400 mg  2,400 mg Oral TID CC    acetaminophen (Tylenol Extra Strength) tab 500 mg  500 mg Oral Q6H PRN    acetaminophen (Tylenol Extra Strength) tab 1,000 mg  1,000 mg Oral Q6H PRN    albuterol (Ventolin HFA) 108 (90 Base) MCG/ACT inhaler 2 puff  2 puff Inhalation Q6H PRN    buPROPion ER (Wellbutrin XL) 24 hr tab 150 mg  150 mg Oral Daily    fenofibrate micronized (Lofibra) cap 134 mg  134 mg Oral Nightly    FLUoxetine (PROzac) cap 40 mg  40 mg Oral Daily    fluticasone propionate (Flonase) 50 MCG/ACT nasal suspension 1 spray  1 spray Each Nare Daily    lamoTRIgine (LaMICtal) tab 100 mg  100 mg Oral Daily    memantine (Namenda) tab 5 mg  5 mg Oral BID    tamsulosin (Flomax) cap 0.4 mg  0.4 mg Oral Daily    heparin (Porcine) 5000 UNIT/ML injection 5,000 Units  5,000 Units Subcutaneous Q8H MARTHA    acetaminophen (Tylenol) tab 650 mg  650 mg Oral Q4H PRN    ARIPiprazole (Abilify) tab 15 mg  15 mg Oral Daily       Laboratory Data:         Lab Results   Component Value Date    INR 1.00 11/06/2024    INR 1.06 09/29/2024    INR 1.11 09/15/2024            Telemetry: No malignant tachyarrhythmias or bradyarrhythmias      Thank you for allowing our practice to participate in the care of your patient. Please do not hesitate to contact me if you have any questions.    Yesenia Rubin MD

## 2024-12-05 NOTE — PROGRESS NOTES
[facility]   part of [facility]    Nephrology Progress Note    Godwin Fonseca Attending:  Yuriy Forrest MD     Cc: ESRD    SUBJECTIVE     No complaints  Planning for MRI    PHYSICAL EXAM   Vital signs: /89 (BP Location: Right arm)   Pulse 80   Temp 97.4 °F (36.3 °C) (Temporal)   Resp 19   Ht 6' 2\" (1.88 m)   Wt 236 lb 6.4 oz (107.2 kg)   SpO2 98%   BMI 30.35 kg/m²   Temp (24hrs), Av.7 °F (36.5 °C), Min:97.4 °F (36.3 °C), Max:98 °F (36.7 °C)       Intake/Output Summary (Last 24 hours) at 2024 1748  Last data filed at 2024 0900  Gross per 24 hour   Intake 240 ml   Output 0 ml   Net 240 ml       Wt Readings from Last 3 Encounters:   24 236 lb 6.4 oz (107.2 kg)   24 236 lb 12.8 oz (107.4 kg)   24 245 lb 9.5 oz (111.4 kg)     General: NAD  HEENT: NCAT, EOMI, MMM  Neck: Supple   Cardiac: Regular rate and rhythm   Lungs: CTAB  Abdomen: Soft, non-tender  Extremities: No edema  Neurologic: No asterxis  Skin: Warm and dry, no rashes     MEDS     Current Facility-Administered Medications   Medication Dose Route Frequency    carvedilol (Coreg) tab 12.5 mg  12.5 mg Oral BID with meals    ondansetron (Zofran) 4 MG/2ML injection 4 mg  4 mg Intravenous Q6H PRN    ondansetron (Zofran-ODT) disintegrating tab 4 mg  4 mg Oral Q6H PRN    sevelamer carbonate (Renvela) tab 2,400 mg  2,400 mg Oral TID CC    acetaminophen (Tylenol Extra Strength) tab 500 mg  500 mg Oral Q6H PRN    acetaminophen (Tylenol Extra Strength) tab 1,000 mg  1,000 mg Oral Q6H PRN    albuterol (Ventolin HFA) 108 (90 Base) MCG/ACT inhaler 2 puff  2 puff Inhalation Q6H PRN    buPROPion ER (Wellbutrin XL) 24 hr tab 150 mg  150 mg Oral Daily    fenofibrate micronized (Lofibra) cap 134 mg  134 mg Oral Nightly    FLUoxetine (PROzac) cap 40 mg  40 mg Oral Daily    fluticasone propionate (Flonase) 50 MCG/ACT nasal suspension 1 spray  1 spray Each Nare Daily    lamoTRIgine (LaMICtal) tab 100 mg  100 mg Oral Daily     memantine (Namenda) tab 5 mg  5 mg Oral BID    tamsulosin (Flomax) cap 0.4 mg  0.4 mg Oral Daily    heparin (Porcine) 5000 UNIT/ML injection 5,000 Units  5,000 Units Subcutaneous Q8H MARTHA    acetaminophen (Tylenol) tab 650 mg  650 mg Oral Q4H PRN    ARIPiprazole (Abilify) tab 15 mg  15 mg Oral Daily       LABS     Lab Results   Component Value Date    WBC 5.3 12/05/2024    HGB 10.9 12/05/2024    HCT 30.7 12/05/2024    .0 12/05/2024    CREATSERUM 10.65 12/05/2024    BUN 67 12/05/2024     12/05/2024    K 4.1 12/05/2024    CL 92 12/05/2024    CO2 28.0 12/05/2024     12/05/2024    CA 10.1 12/05/2024    ALB 4.1 12/05/2024    MG 2.6 12/05/2024    PHOS 8.7 12/05/2024    PGLU 101 12/05/2024       IMAGING   All imaging studies personally reviewed.    XR CHEST AP PORTABLE  (CPT=71045)   Final Result   PROCEDURE:  XR CHEST AP PORTABLE  (CPT=71045)       TECHNIQUE:  AP chest radiograph was obtained.       COMPARISON:  [facility], XR, XR CHEST AP PORTABLE  (CPT=71045),    11/06/2024, 12:20 PM.       INDICATIONS:  Diaphoresis        PATIENT STATED HISTORY: (As transcribed by Technologist)  Patient offered    no additional history at this time.             FINDINGS:  Support devices appear overall stable.  Small right pleural    effusion is favored.  A background of mild vascular congestion and volume    overload is likely present.  Consolidation at the right lung base is    difficult to entirely exclude.  If there    is persistent concern then consider follow-up imaging.                         =====   CONCLUSION:  See above.           LOCATION:  [id number]                       Dictated by (CST): Smith Randle MD on 12/02/2024 at 7:46 PM        Finalized by (CST): Smith Randle MD on 12/02/2024 at 7:48 PM         MRI CERVICAL+THORACIC+LUMBAR SPINE  (CPT=72141/75485/18094)    (Results Pending)         ASSESSMENT & PLAN   44 year old male with history of ESRD on iHD MWF via [facility] CVC, LUE AVF, hyperaldosteronism, DM,  bipolar disorder, recurrent GI bleed, pneumonia recently presented with weakness and nose bleeds    ESRD:  -- sp HD Sunday due to missed Friday.   -- sp HD 12/3/24. Hold off on HD today given under TW (240) and labs stable.   -- plan for HD tomorrow to get back on MWF schedule, 3K, UF 3-4L as tolerated. HD directly managed  -- continue MWF HD schedule  -- Will use catheter here (has AVF but unclear what size needles they are using as outpt).    -- getting MRI, if using pavan let nephrology know   -- avoid class 1 gadolinium agents     Anemia:  -- epo with HD if BP controlled     Hyperphosphatemia:  -- sevelamer 2400 TID AC  -- low phos diet - decrease nuts     HTN:  -- historically has had high BPs, and then liable. Was low the other day, may meds on hold   -- holding many meds, adjustments per cards appreciated    Diaphoresis  -- pending = blood cultures and cultures from line = NGTD    Thank you for allowing me to participate in the care of this patient. Please do not hesitate to call with questions or concerns.       Samaria Nava MD  [facility] Nephrology

## 2024-12-06 ENCOUNTER — APPOINTMENT (OUTPATIENT)
Dept: MRI IMAGING | Facility: HOSPITAL | Age: 44
End: 2024-12-06
Payer: MEDICARE

## 2024-12-06 LAB
ALBUMIN SERPL-MCNC: 4.1 G/DL (ref 3.2–4.8)
ANION GAP SERPL CALC-SCNC: 15 MMOL/L (ref 0–18)
BASOPHILS # BLD AUTO: 0.12 X10(3) UL (ref 0–0.2)
BASOPHILS NFR BLD AUTO: 2.1 %
BUN BLD-MCNC: 71 MG/DL (ref 9–23)
CALCIUM BLD-MCNC: 9.7 MG/DL (ref 8.7–10.4)
CHLORIDE SERPL-SCNC: 99 MMOL/L (ref 98–112)
CO2 SERPL-SCNC: 22 MMOL/L (ref 21–32)
CREAT BLD-MCNC: 11.71 MG/DL
EGFRCR SERPLBLD CKD-EPI 2021: 5 ML/MIN/1.73M2 (ref 60–?)
EOSINOPHIL # BLD AUTO: 0.59 X10(3) UL (ref 0–0.7)
EOSINOPHIL NFR BLD AUTO: 10.3 %
ERYTHROCYTE [DISTWIDTH] IN BLOOD BY AUTOMATED COUNT: 13.2 %
GLUCOSE BLD-MCNC: 118 MG/DL (ref 70–99)
GLUCOSE BLD-MCNC: 124 MG/DL (ref 70–99)
GLUCOSE BLD-MCNC: 133 MG/DL (ref 70–99)
GLUCOSE BLD-MCNC: 146 MG/DL (ref 70–99)
GLUCOSE BLD-MCNC: 76 MG/DL (ref 70–99)
HCT VFR BLD AUTO: 29.1 %
HGB BLD-MCNC: 10.3 G/DL
IMM GRANULOCYTES # BLD AUTO: 0.02 X10(3) UL (ref 0–1)
IMM GRANULOCYTES NFR BLD: 0.3 %
LYMPHOCYTES # BLD AUTO: 1.75 X10(3) UL (ref 1–4)
LYMPHOCYTES NFR BLD AUTO: 30.6 %
MAGNESIUM SERPL-MCNC: 2.4 MG/DL (ref 1.6–2.6)
MCH RBC QN AUTO: 31.7 PG (ref 26–34)
MCHC RBC AUTO-ENTMCNC: 35.4 G/DL (ref 31–37)
MCV RBC AUTO: 89.5 FL
MONOCYTES # BLD AUTO: 0.75 X10(3) UL (ref 0.1–1)
MONOCYTES NFR BLD AUTO: 13.1 %
NEUTROPHILS # BLD AUTO: 2.49 X10 (3) UL (ref 1.5–7.7)
NEUTROPHILS # BLD AUTO: 2.49 X10(3) UL (ref 1.5–7.7)
NEUTROPHILS NFR BLD AUTO: 43.6 %
OSMOLALITY SERPL CALC.SUM OF ELEC: 302 MOSM/KG (ref 275–295)
PHOSPHATE SERPL-MCNC: 9.4 MG/DL (ref 2.4–5.1)
PLATELET # BLD AUTO: 303 10(3)UL (ref 150–450)
POTASSIUM SERPL-SCNC: 4.2 MMOL/L (ref 3.5–5.1)
RBC # BLD AUTO: 3.25 X10(6)UL
SODIUM SERPL-SCNC: 136 MMOL/L (ref 136–145)
TROPONIN I SERPL HS-MCNC: 85 NG/L
TROPONIN I SERPL HS-MCNC: 96 NG/L
WBC # BLD AUTO: 5.7 X10(3) UL (ref 4–11)

## 2024-12-06 PROCEDURE — 72148 MRI LUMBAR SPINE W/O DYE: CPT

## 2024-12-06 PROCEDURE — 72141 MRI NECK SPINE W/O DYE: CPT

## 2024-12-06 PROCEDURE — 72146 MRI CHEST SPINE W/O DYE: CPT

## 2024-12-06 PROCEDURE — 99233 SBSQ HOSP IP/OBS HIGH 50: CPT | Performed by: OTHER

## 2024-12-06 RX ORDER — ASPIRIN 81 MG/1
324 TABLET, CHEWABLE ORAL ONCE
Status: COMPLETED | OUTPATIENT
Start: 2024-12-06 | End: 2024-12-06

## 2024-12-06 RX ORDER — NIFEDIPINE 30 MG/1
30 TABLET, EXTENDED RELEASE ORAL DAILY
Status: DISCONTINUED | OUTPATIENT
Start: 2024-12-06 | End: 2024-12-07

## 2024-12-06 RX ORDER — GABAPENTIN 100 MG/1
100 CAPSULE ORAL 3 TIMES DAILY
Status: DISCONTINUED | OUTPATIENT
Start: 2024-12-06 | End: 2024-12-07

## 2024-12-06 RX ORDER — CARVEDILOL 12.5 MG/1
25 TABLET ORAL 2 TIMES DAILY WITH MEALS
Status: DISCONTINUED | OUTPATIENT
Start: 2024-12-06 | End: 2024-12-07

## 2024-12-06 RX ORDER — ASPIRIN 81 MG/1
TABLET, CHEWABLE ORAL
Status: DISPENSED
Start: 2024-12-06 | End: 2024-12-06

## 2024-12-06 RX ORDER — NITROGLYCERIN 0.4 MG/1
TABLET SUBLINGUAL
Status: COMPLETED
Start: 2024-12-06 | End: 2024-12-06

## 2024-12-06 RX ORDER — NITROGLYCERIN 0.4 MG/1
0.4 TABLET SUBLINGUAL EVERY 5 MIN PRN
Status: DISCONTINUED | OUTPATIENT
Start: 2024-12-06 | End: 2024-12-07

## 2024-12-06 NOTE — PROGRESS NOTES
Cardiology Progress Note    Godwin Fonseca Patient Status:  Inpatient    1980 MRN [id number]   Location [facility] 8NE-A Attending Laila Ryder MD   Hosp Day # 0 PCP Adrian Small MD     IMPRESSIONS:  Chronic troponin elevation: flat trend up to 255. Lower than last admission which was >2300  Cath  for elevated troponin with mild irregularities  h/o severe MR s/p MVR , redo complex robotically assisted MVR (Bal2022)   Chronic HFpEF: euvolemic   HTN: now with multiple positive orthostatic blood pressures. Improved over the past 2 days  Bipolar disease  ESRD on HD  DVT/PE   History TIA        PLAN:  Given chronic elevations troponin and only mild \"irregularities\" at [facility]  for elevated troponin, will forgo ischemic eval given normal EF on echo without wall motion abnormalities on  .   BP improved off medications. Will increase coreg back to 25 mg BID and add nifedipine 30 mg. Weakness is mildly improved. MRI spine completed   Monitor for chest pains   Moderate LVH and mild to moderate MR  Tele     Subjective:  BP higher today   Weakness today     Objective:  BP (!) 165/109 (BP Location: Right arm)   Pulse 93   Temp 97.9 °F (36.6 °C) (Oral)   Resp (!) 28   Ht 6' 2\" (1.88 m)   Wt 236 lb 6.4 oz (107.2 kg)   SpO2 98%   BMI 30.35 kg/m²   Temp (24hrs), Av.8 °F (36.6 °C), Min:97.3 °F (36.3 °C), Max:98.1 °F (36.7 °C)      Intake/Output Summary (Last 24 hours) at 2024 0928  Last data filed at 2024 0830  Gross per 24 hour   Intake 360 ml   Output --   Net 360 ml       Wt Readings from Last 3 Encounters:   24 236 lb 6.4 oz (107.2 kg)   24 236 lb 12.8 oz (107.4 kg)   24 245 lb 9.5 oz (111.4 kg)       General: Awake and alert; in no acute distress  Cardiac: Regular rate and regular rhythm; no murmurs/rubs/gallops are appreciated  Lungs: Clear to auscultation bilaterally; no accessory muscle use  Abdomen: Soft, tender to touch but stable from prior    Extremities: No clubbing/cyanosis; moves all 4 extremities normally    Current Facility-Administered Medications   Medication Dose Route Frequency    carvedilol (Coreg) tab 25 mg  25 mg Oral BID with meals    NIFEdipine ER (Procardia-XL) 24 hr tab 30 mg  30 mg Oral Daily    sodium chloride 0.9 % IV bolus 100 mL  100 mL Intravenous Q30 Min PRN    And    albumin human (Albumin) 25% injection 25 g  25 g Intravenous PRN Dialysis    heparin (Porcine) 1000 UNIT/ML injection 1,500 Units  1.5 mL Intracatheter Once    gabapentin (Neurontin) cap 100 mg  100 mg Oral Nightly    ondansetron (Zofran) 4 MG/2ML injection 4 mg  4 mg Intravenous Q6H PRN    ondansetron (Zofran-ODT) disintegrating tab 4 mg  4 mg Oral Q6H PRN    sevelamer carbonate (Renvela) tab 2,400 mg  2,400 mg Oral TID CC    acetaminophen (Tylenol Extra Strength) tab 500 mg  500 mg Oral Q6H PRN    acetaminophen (Tylenol Extra Strength) tab 1,000 mg  1,000 mg Oral Q6H PRN    albuterol (Ventolin HFA) 108 (90 Base) MCG/ACT inhaler 2 puff  2 puff Inhalation Q6H PRN    buPROPion ER (Wellbutrin XL) 24 hr tab 150 mg  150 mg Oral Daily    fenofibrate micronized (Lofibra) cap 134 mg  134 mg Oral Nightly    FLUoxetine (PROzac) cap 40 mg  40 mg Oral Daily    fluticasone propionate (Flonase) 50 MCG/ACT nasal suspension 1 spray  1 spray Each Nare Daily    lamoTRIgine (LaMICtal) tab 100 mg  100 mg Oral Daily    memantine (Namenda) tab 5 mg  5 mg Oral BID    tamsulosin (Flomax) cap 0.4 mg  0.4 mg Oral Daily    heparin (Porcine) 5000 UNIT/ML injection 5,000 Units  5,000 Units Subcutaneous Q8H MARTHA    acetaminophen (Tylenol) tab 650 mg  650 mg Oral Q4H PRN    ARIPiprazole (Abilify) tab 15 mg  15 mg Oral Daily       Laboratory Data:  Lab Results   Component Value Date    WBC 5.7 12/06/2024    HGB 10.3 12/06/2024    HCT 29.1 12/06/2024    .0 12/06/2024       Lab Results   Component Value Date    INR 1.00 11/06/2024    INR 1.06 09/29/2024    INR 1.11 09/15/2024     Lab Results    Component Value Date     12/06/2024    K 4.2 12/06/2024    CL 99 12/06/2024    CO2 22.0 12/06/2024    BUN 71 12/06/2024    CREATSERUM 11.71 12/06/2024    GLU 76 12/06/2024    CA 9.7 12/06/2024    MG 2.4 12/06/2024       Telemetry: No malignant tachyarrhythmias or bradyarrhythmias      Thank you for allowing our practice to participate in the care of your patient. Please do not hesitate to contact me if you have any questions.    Yesenia Rubin MD

## 2024-12-06 NOTE — PROGRESS NOTES
[facility]  [facility] Neurology Progress Note    Godwin Fonseca Patient Status:  Observation    1980 MRN [id number]   Location [facility] 7NE-A Attending Jhonny Rebolledo MD   Hosp Day # 0 PCP Adrian Small MD     CC: BLE weakness    Subjective:  Patient was seen for follow up visit today. Pt had RRT called for chest, neck and back pain during HD session earlier today. Pt in bed, reports feeling better and chest pain subsiding after nitroglycerin administration. Denies numbness/tingling/paresthesia anywhere on the body, no reports of nausea, vomiting, sob. No new focal neurological deficit noted.            MEDICATIONS:  No current outpatient medications on file.     Current Facility-Administered Medications   Medication Dose Route Frequency    carvedilol (Coreg) tab 25 mg  25 mg Oral BID with meals    NIFEdipine ER (Procardia-XL) 24 hr tab 30 mg  30 mg Oral Daily    sodium chloride 0.9 % IV bolus 100 mL  100 mL Intravenous Q30 Min PRN    And    albumin human (Albumin) 25% injection 25 g  25 g Intravenous PRN Dialysis    heparin (Porcine) 1000 UNIT/ML injection 1,500 Units  1.5 mL Intracatheter Once    gabapentin (Neurontin) cap 100 mg  100 mg Oral Nightly    ondansetron (Zofran) 4 MG/2ML injection 4 mg  4 mg Intravenous Q6H PRN    ondansetron (Zofran-ODT) disintegrating tab 4 mg  4 mg Oral Q6H PRN    sevelamer carbonate (Renvela) tab 2,400 mg  2,400 mg Oral TID CC    acetaminophen (Tylenol Extra Strength) tab 500 mg  500 mg Oral Q6H PRN    acetaminophen (Tylenol Extra Strength) tab 1,000 mg  1,000 mg Oral Q6H PRN    albuterol (Ventolin HFA) 108 (90 Base) MCG/ACT inhaler 2 puff  2 puff Inhalation Q6H PRN    buPROPion ER (Wellbutrin XL) 24 hr tab 150 mg  150 mg Oral Daily    fenofibrate micronized (Lofibra) cap 134 mg  134 mg Oral Nightly    FLUoxetine (PROzac) cap 40 mg  40 mg Oral Daily    fluticasone propionate (Flonase) 50 MCG/ACT nasal suspension 1 spray  1 spray Each Nare Daily    lamoTRIgine  (LaMICtal) tab 100 mg  100 mg Oral Daily    memantine (Namenda) tab 5 mg  5 mg Oral BID    tamsulosin (Flomax) cap 0.4 mg  0.4 mg Oral Daily    heparin (Porcine) 5000 UNIT/ML injection 5,000 Units  5,000 Units Subcutaneous Q8H MARTHA    acetaminophen (Tylenol) tab 650 mg  650 mg Oral Q4H PRN    ARIPiprazole (Abilify) tab 15 mg  15 mg Oral Daily       REVIEW OF SYSTEMS:  A 10-point system was reviewed.  Pertinent positives and negatives are noted in HPI.      PHYSICAL EXAMINATION:  VITAL SIGNS: BP (!) 165/109 (BP Location: Right arm)   Pulse 93   Temp 97.9 °F (36.6 °C) (Oral)   Resp (!) 28   Ht 74\"   Wt 236 lb 6.4 oz (107.2 kg)   SpO2 98%   BMI 30.35 kg/m²   GENERAL:  Patient is a 44 year old male in no acute distress.  HEENT:  Normocephalic, atraumatic  ABD: Soft, non tender  SKIN: Warm, dry, no rashes    NEUROLOGICAL:   Mental status: Oriented to person, place, situation and time   Speech: Fluent, no obvious aphasia or dysarthria  Memory and comprehension: Intact   Cranial Nerves: VFF, PERRL 3mm brisk, EOMI, no nystagmus, facial sensation intact, face symmetric, tongue midline, shoulder shrug equal, remainder CN intact  Motor: No drift, no focal arm weakness bilaterally, BLE weakness with motor exam 3-4/5, DTRs 0-1/4 throughout  Sensory: Intact to light touch  Coordination: Finger-to-nose test normal  Gait: Deferred      Imaging/Diagnostics:  MRI CERVICAL+THORACIC+LUMBAR SPINE  (CPT=72141/66027/86842)    Result Date: 12/6/2024  CONCLUSION:  Postsurgical changes of presumed prior laminectomy noted at L4-5 with a moderate diffuse disc bulge.  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  There is mild central canal stenosis noted at C5-6 and C6-7 with mild left neural foraminal stenosis favored at C5-6.     LOCATION:  [facility]   Dictated by (CST): Smith Randle MD on 12/06/2024 at 7:31 AM     Finalized  by (CST): Smith Randle MD on 12/06/2024 at 8:25 AM       XR CHEST AP PORTABLE  (CPT=71045)    Result Date: 12/2/2024  CONCLUSION:  See above.   LOCATION:  [id number]      Dictated by (CST): Smith Randle MD on 12/02/2024 at 7:46 PM     Finalized by (CST): Smith Randle MD on 12/02/2024 at 7:48 PM          Labs:  Recent Labs   Lab 12/04/24 0644 12/05/24  1318 12/06/24  0729   RBC 3.80* 3.47* 3.25*   HGB 11.9* 10.9* 10.3*   HCT 34.9* 30.7* 29.1*   MCV 91.8 88.5 89.5   MCH 31.3 31.4 31.7   MCHC 34.1 35.5 35.4   RDW 13.3 13.2 13.2   NEPRELIM 2.23 2.46 2.49   WBC 5.4 5.3 5.7   .0 286.0 303.0         Recent Labs   Lab 12/04/24 0644 12/05/24  1318 12/06/24  0729   GLU 87 112* 76   BUN 44* 67* 71*   CREATSERUM 8.40* 10.65* 11.71*   EGFRCR 7* 5* 5*   CA 10.2 10.1 9.7    135* 136   K 4.8 4.1 4.2   CL 94* 92* 99   CO2 30.0 28.0 22.0       Pre-morbid mRS 1-2      Assessment and Plan:     A 44 year old male with:    Weakness - pt presented with BLE weakness, thus missed his HD, episode of nose bleed. Differential include neurogenic (eg spinal stenosis, peripheral nerve disorder etc) vs toxic/metabolic/cardiogenic.   - Considering pt hx of spinal stenosis, MRI cervical/thoracic/lumbar ordered - revealed postsurgical changes of presumed prior laminectomy noted at L4-5 with a moderate diffuse disc bulge.  Overall mild bilateral neural foraminal stenosis is favored with some clumping of the nerve roots bilaterally in this region, which may represent arachnoiditis.There is mild central canal stenosis noted at C5-6 and C6-7 with mild left neural foraminal stenosis favored at C5-6.    - Given abnormal MRI spine results - Neurosurgery consulted for further investigation.  2.  ESRD  - pt on HD, Nephrology following.  3. Troponemia and Hypotension - Cardiology following pt.     4. Discussed plan of care with pt, all questions answered. Case discussed with nursing and Dr. Chaparro. Will continue to follow.          Is this a shared or  split note between Advanced Practice Provider and Physician? 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  I spent a total time of 35 minutes on patient care, with >50 % of the time was spent counseling patient/family and/or coordination of care regarding all studies' results, assessment, treatment options and care plan.    Subjective:   C/o back pain  Objective:   O: BP (!) 140/107   Pulse 93   Temp 97.9 °F (36.6 °C) (Oral)   Resp 18   Ht 74\"   Wt 236 lb 6.4 oz (107.2 kg)   SpO2 99%   BMI 30.35 kg/m²   Neuro exam unchanged, see above for detail    Antonio Chaparro MD (Michael)   Neurology  [facility]  12/6/2024

## 2024-12-06 NOTE — PROGRESS NOTES
[facility] Hospitalist Progress Note     PCP: Adrian Small MD    Chief Complaint: follow-up   Follow up for: The primary encounter diagnosis was Weakness. Diagnoses of Stage 5 chronic kidney disease on chronic dialysis (HCC) and Nosebleed were also pertinent to this visit.    Overnight/Interim Events:      SUBJECTIVE:  Pt seen and examined  Pt feels tired. Lying on his side  HS in session  Pt states legs feels weak, legs not like normal - can only walk short distance  On exam strength of legs normal  No chest pain, palpitations, shortness of breath, nausea, vomiting, abdominal pain.     OBJECTIVE:  Temp:  [97.3 °F (36.3 °C)-98.1 °F (36.7 °C)] 97.9 °F (36.6 °C)  Pulse:  [] 97  Resp:  [11-25] 16  BP: (135-187)/() 136/103  SpO2:  [97 %-98 %] 98 %    Intake/Output:    Intake/Output Summary (Last 24 hours) at 12/6/2024 0827  Last data filed at 12/5/2024 0900  Gross per 24 hour   Intake 240 ml   Output --   Net 240 ml         Last 3 Weights   12/03/24 1616 236 lb 6.4 oz (107.2 kg)   12/02/24 0442 242 lb 9.6 oz (110 kg)   12/01/24 0459 240 lb (108.9 kg)   12/01/24 0148 240 lb (108.9 kg)   12/03/24 1611 236 lb 12.8 oz (107.4 kg)   11/19/24 0519 245 lb 9.5 oz (111.4 kg)   11/18/24 0525 240 lb 11.9 oz (109.2 kg)   11/16/24 1723 240 lb 15.4 oz (109.3 kg)   11/16/24 1236 240 lb (108.9 kg)       Exam    General: Alert, no distress, appears stated age.     Lungs:   Clear to auscultation bilaterally. Normal effort, no crackles, no wheezing   Heart:  Regular rate and rhythm, S1, S2 normal, no murmur,   Abdomen:   Soft, NT/ND, Bowel sounds normal. Norebound or guarding   Extremities: Extremities normal, atraumatic, no LE edema.   Neurologic: Moving all extremities spontaneously, no focal deficit appreciated      Data Review:       Labs:     Recent Labs   Lab 12/02/24  0544 12/03/24  0558 12/04/24  0644 12/05/24  1318 12/06/24  0729   WBC 4.6 5.3 5.4 5.3 5.7   HGB 9.6* 9.7* 11.9* 10.9* 10.3*   MCV 90.6 91.3 91.8 88.5 89.5    .0 255.0 314.0 286.0 303.0       Recent Labs   Lab 12/01/24  0210 12/02/24  0544 12/03/24  0558 12/04/24  0644 12/05/24  1318    136  136 137 138 135*   K 4.2 4.0  4.0 4.4 4.8 4.1    98  98 100 94* 92*   CO2 21.0 26.0  26.0 25.0 30.0 28.0   BUN 80* 41*  41* 61* 44* 67*   CREATSERUM 12.46* 7.87*  7.87* 9.68* 8.40* 10.65*   CA 8.7 8.8  8.8 9.0 10.2 10.1   MG  --  2.1 2.3 2.4 2.6   PHOS  --  6.8* 7.5* 8.0* 8.7*   * 209*  209* 102* 87 112*       Recent Labs   Lab 12/01/24  0210 12/02/24  0544 12/03/24  0558 12/04/24  0644 12/05/24  1318   ALT 11 9*  --   --   --    AST 19 17  --   --   --    ALB 4.4 4.2  4.2 3.9 4.6 4.1       Recent Labs   Lab 12/04/24  1154 12/04/24  1540 12/05/24  1337 12/05/24  1741 12/05/24  2050   PGLU 142* 108* 105* 101* 116*       No results for input(s): \"TROP\" in the last 168 hours.      Meds:      carvedilol  25 mg Oral BID with meals    NIFEdipine ER  30 mg Oral Daily    heparin  1.5 mL Intracatheter Once    gabapentin  100 mg Oral Nightly    sevelamer carbonate  2,400 mg Oral TID CC    buPROPion ER  150 mg Oral Daily    fenofibrate micronized  134 mg Oral Nightly    FLUoxetine  40 mg Oral Daily    fluticasone propionate  1 spray Each Nare Daily    lamoTRIgine  100 mg Oral Daily    memantine  5 mg Oral BID    tamsulosin  0.4 mg Oral Daily    heparin  5,000 Units Subcutaneous Q8H MARTHA    ARIPiprazole  15 mg Oral Daily         sodium chloride **AND** albumin human    ondansetron    ondansetron    acetaminophen    acetaminophen    albuterol    acetaminophen **OR** [DISCONTINUED] HYDROcodone-acetaminophen **OR** [DISCONTINUED] HYDROcodone-acetaminophen       Assessment/Plan:     44 year old male with PMH including but not limited to ADD, bipolar, HTN, DVT/PE, hyperlipidemia, DM, CKD on dialysis, MGUS, cervical spondylosis, and TIA in 2021 who p/t  ED c weakness and missed HD.     Weakness  - likely 2/2 missed HD, stressed need to go to HD even if weak as  weill only make him weaker; BP meds possibly contributing as well   - discussed narcotics can cause lethargy/drowsiness, agreeable to hold norco and just try tylenol   - PT/OT--> home therapy   - has seen as o/p Vishal Hurt MD on 11/06/2024.   -CT head and MRI brain negative 11/16  -neuro following --> mri spine ordered >> result  pending     ESRD  - per renal, plan HD T and W, got Sun     Elevated trop  - has had before, chronic   - apprec cards recs      Hyperphosphatemia:  -- sevelamer 2400 TID AC     Non-obstructive minimal CAD on cath 4/24  H/o severe MR s/p MVR, redo complex robotically assisted MVR 2022  Chronic HFpEF  HTN  - Hold hydralazine, losartan, and nifedipine given lower BPs and orthostatsis and weakness; coreg ok, dose lowered. Follow BP and sxs, d/w Yesenia Rubin MD   - pt reports Low BPs at home 80/60s  - echo EF 60-65%, G2DD  - h/o cath 2023 with mild irregularities     Diaphoresis  - noted later in day, d/w MD Brandy   - check Blood cxs peripherally and from line, PCT, LA, CXR, UA (if makes urine), CXR; temp/WBC ok--> W/U currently neg  - drsg for line when came in not optimal     Anemia  Nose bleeds  - 2/2 ESRD  - trend     Finding of possible \"cystitis\" on imaging  - seen on previous CT as well  -Patient has had cystoscopy that looked okay in the past per his report.  He also has been treated for possible prostatitis.  He does seem to have more chronic discomfort in the suprapubic area.     LLQ lump- lipoma versus other subcutaneous nodule (?could it be from previous heparin injection site)  -No findings on imaging to suggest a deeper process.    - o/p f/u       # Cervical radiculopathy  -Pain control as needed  -Follow-up with spine surgeon  -MRI spine ordered per neuro      # Bipolar disorder  -Continue Abilify, bupropion, Lamictal   - follows c pyschiatrist, reports dose adjustment in meds     # Depression  -Continue fluoxetine      #COPD, chronic  -Continue home  inhalers    #DVT Proph  -heparin sq     Dispo: no discharge        Ria Gutierrez MD  [facility]  Pager [phone]  Answering Service number: [phone]

## 2024-12-06 NOTE — PROGRESS NOTES
[facility]   part of [facility]    Nephrology Progress Note    Godwin Fonseca Attending:  Yuriy Forrest MD     Cc: ESRD    SUBJECTIVE     No complaints  Sp MRI    PHYSICAL EXAM   Vital signs: BP (!) 140/107   Pulse 93   Temp 97.9 °F (36.6 °C) (Oral)   Resp 18   Ht 6' 2\" (1.88 m)   Wt 236 lb 6.4 oz (107.2 kg)   SpO2 99%   BMI 30.35 kg/m²   Temp (24hrs), Av.8 °F (36.6 °C), Min:97.3 °F (36.3 °C), Max:98.1 °F (36.7 °C)       Intake/Output Summary (Last 24 hours) at 2024 1644  Last data filed at 2024 1330  Gross per 24 hour   Intake 360 ml   Output --   Net 360 ml       Wt Readings from Last 3 Encounters:   24 236 lb 6.4 oz (107.2 kg)   24 236 lb 12.8 oz (107.4 kg)   24 245 lb 9.5 oz (111.4 kg)     General: NAD  HEENT: NCAT, EOMI, MMM  Neck: Supple   Cardiac: Regular rate and rhythm   Lungs: CTAB  Abdomen: Soft, non-tender  Extremities: No edema  Neurologic: No asterxis  Skin: Warm and dry, no rashes     MEDS     Current Facility-Administered Medications   Medication Dose Route Frequency    carvedilol (Coreg) tab 25 mg  25 mg Oral BID with meals    NIFEdipine ER (Procardia-XL) 24 hr tab 30 mg  30 mg Oral Daily    aspirin 81 MG chewable tab        nitroglycerin (Nitrostat) SL tab 0.4 mg  0.4 mg Sublingual Q5 Min PRN    gabapentin (Neurontin) cap 100 mg  100 mg Oral TID    sodium chloride 0.9 % IV bolus 100 mL  100 mL Intravenous Q30 Min PRN    And    albumin human (Albumin) 25% injection 25 g  25 g Intravenous PRN Dialysis    heparin (Porcine) 1000 UNIT/ML injection 1,500 Units  1.5 mL Intracatheter Once    ondansetron (Zofran) 4 MG/2ML injection 4 mg  4 mg Intravenous Q6H PRN    ondansetron (Zofran-ODT) disintegrating tab 4 mg  4 mg Oral Q6H PRN    sevelamer carbonate (Renvela) tab 2,400 mg  2,400 mg Oral TID CC    acetaminophen (Tylenol Extra Strength) tab 500 mg  500 mg Oral Q6H PRN    acetaminophen (Tylenol Extra Strength) tab 1,000 mg  1,000 mg Oral Q6H PRN     albuterol (Ventolin HFA) 108 (90 Base) MCG/ACT inhaler 2 puff  2 puff Inhalation Q6H PRN    buPROPion ER (Wellbutrin XL) 24 hr tab 150 mg  150 mg Oral Daily    fenofibrate micronized (Lofibra) cap 134 mg  134 mg Oral Nightly    FLUoxetine (PROzac) cap 40 mg  40 mg Oral Daily    fluticasone propionate (Flonase) 50 MCG/ACT nasal suspension 1 spray  1 spray Each Nare Daily    lamoTRIgine (LaMICtal) tab 100 mg  100 mg Oral Daily    memantine (Namenda) tab 5 mg  5 mg Oral BID    tamsulosin (Flomax) cap 0.4 mg  0.4 mg Oral Daily    heparin (Porcine) 5000 UNIT/ML injection 5,000 Units  5,000 Units Subcutaneous Q8H MARTHA    acetaminophen (Tylenol) tab 650 mg  650 mg Oral Q4H PRN    ARIPiprazole (Abilify) tab 15 mg  15 mg Oral Daily       LABS     Lab Results   Component Value Date    WBC 5.7 12/06/2024    HGB 10.3 12/06/2024    HCT 29.1 12/06/2024    .0 12/06/2024    CREATSERUM 11.71 12/06/2024    BUN 71 12/06/2024     12/06/2024    K 4.2 12/06/2024    CL 99 12/06/2024    CO2 22.0 12/06/2024    GLU 76 12/06/2024    CA 9.7 12/06/2024    ALB 4.1 12/06/2024    MG 2.4 12/06/2024    PHOS 9.4 12/06/2024    PGLU 133 12/06/2024       IMAGING   All imaging studies personally reviewed.    MRI CERVICAL+THORACIC+LUMBAR SPINE  (CPT=72141/06351/56061)   Final Result   PROCEDURE:  MRI CERVICAL+THORACIC+LUMBAR SPINE (CPT=72141/08980/72278)       COMPARISON:  [facility], MRI SPINE CERVICAL (CPT=72141), 10/19/2024, 8:25    PM.       INDICATIONS:  Back pain       TECHNIQUE:  A comprehensive examination was performed utilizing a variety    of imaging planes and imaging parameters to optimize visualization of    suspected pathology.  Images were performed without intravenous contrast.       PATIENT STATED HISTORY: (As transcribed by Technologist)  Pt stated that    he developed lower back pain that radiates down his legs.            FINDINGS:  Please note that the exam is somewhat limited due to motion.       Cervical spine:        There is straightening of the normal cervical lordosis.  No significant    spondylolisthesis is present.  Vertebral bodies appear maintained in    height.  Intervertebral disc spaces appear overall maintained.       No masses or fluid collections are present within the cervical spinal    canal.  The cervical spinal cord is normal in course and caliber.  No    areas of cord signal abnormality are seen.       Posterior disc osteophyte complex noted at C5-6 and C6-7 with overall mild    areas of central canal stenosis present.  This is similar to the prior    exam accounting for differences in technique.  Overall mild left neural    foraminal stenosis is favored at    C5-6.       Paravertebral soft tissues are unremarkable in appearance.       Thoracic spine:       The exam is significantly limited due to the degree of motion.  There is    normal thoracic kyphosis.  No significant spondylolisthesis is present.     Vertebral bodies appear maintained in height.  No definite cord signal    abnormality is seen.       Within the limitations of the exam no significant central canal or neural    foraminal stenosis is seen within the thoracic spine.  The visualized    paravertebral soft tissues are unremarkable in appearance.       Lumbar spine:       There is normal lumbar lordosis.  No significant spondylolisthesis is    present.  Vertebral bodies appear maintained in height.  Moderate    intervertebral disc space narrowing with diffuse disc desiccation noted at    L4-5 and L5-S1.  The distal spinal cord is    normal in course and caliber.  No areas of cord signal abnormality are    seen.       No evidence of significant central canal or neural foraminal stenosis    present at L1-2, L2-3.  At L3-4 there is a minimal diffuse disc bulge with    mild facet arthropathy resulting in overall minimal central canal    stenosis.  No significant neural foraminal    stenosis is seen.       At L4-5 postsurgical changes of prior  laminectomy are noted.  A moderate    diffuse disc bulge is present.  Overall mild bilateral neural foraminal    stenosis is favored.  No significant central canal stenosis is seen.     There is some clumping of nerve roots    bilaterally in this region which may represent arachnoiditis.       L5-S1 there is a mild diffuse disc bulge present with mild facet    arthropathy.  No significant central canal stenosis is seen.  There is no    significant neural foraminal stenosis.       Paravertebral soft tissues are unremarkable in appearance.                          =====   CONCLUSION:  Postsurgical changes of presumed prior laminectomy noted at    L4-5 with a moderate diffuse disc bulge.  Overall mild bilateral neural    foraminal stenosis is favored with some clumping of the nerve roots    bilaterally in this region, which may    represent arachnoiditis.       There is mild central canal stenosis noted at C5-6 and C6-7 with mild left    neural foraminal stenosis favored at C5-6.                   LOCATION:  [facility]           Dictated by (CST): Smith Randle MD on 12/06/2024 at 7:31 AM        Finalized by (CST): Smith Randle MD on 12/06/2024 at 8:25 AM         XR CHEST AP PORTABLE  (CPT=71045)   Final Result   PROCEDURE:  XR CHEST AP PORTABLE  (CPT=71045)       TECHNIQUE:  AP chest radiograph was obtained.       COMPARISON:  [facility], XR, XR CHEST AP PORTABLE  (CPT=71045),    11/06/2024, 12:20 PM.       INDICATIONS:  Diaphoresis        PATIENT STATED HISTORY: (As transcribed by Technologist)  Patient offered    no additional history at this time.             FINDINGS:  Support devices appear overall stable.  Small right pleural    effusion is favored.  A background of mild vascular congestion and volume    overload is likely present.  Consolidation at the right lung base is    difficult to entirely exclude.  If there    is persistent concern then consider follow-up imaging.                         =====   CONCLUSION:  See  above.           LOCATION:  [place] [id number]                       Dictated by (CST): Smith Randle MD on 12/02/2024 at 7:46 PM        Finalized by (CST): Smith Randle MD on 12/02/2024 at 7:48 PM               ASSESSMENT & PLAN   44 year old male with history of ESRD on iHD MWF via [facility] CVC, LUE AVF, hyperaldosteronism, DM, bipolar disorder, recurrent GI bleed, pneumonia recently presented with weakness and nose bleeds    ESRD:  -- sp HD Sunday due to missed Friday.   -- sp HD 12/3/24. Hold off on HD today given under TW (240) and labs stable.   -- plan for HD today to get back on MWF schedule, 3K, UF 3-4L as tolerated. HD directly managed  Noted chest pain and HA on HD, rapid called, blood returned and HD paused. BP did not drop on HD.   Was evaluated by cards and I d/w cards at length. Trops down. They do not think it is cardiac in origin. May be due to his spinal etiology. Ok to resume HD per cards. Will plan to remove less UF but resume. Pt did resume HD with no further pain, tolerated 2.1L but not more due to cramping at the end of the treatment   -- continue MWF HD schedule  -- Will use catheter here (has AVF but unclear what size needles they are using as outpt).    -- getting MRI, if using pavan let nephrology know   -- avoid class 1 gadolinium agents     Anemia:  -- epo with HD if BP controlled     Hyperphosphatemia:  -- sevelamer 2400 TID AC. low phos diet. May need to add phoslo     HTN:  -- historically has had high BPs, and then liable. Was low the other day, may meds on hold, now resuming, adjustments per cards appreciated    Diaphoresis, resolved/improved  -- pending = blood cultures and cultures from line = NGTD    Thank you for allowing me to participate in the care of this patient. Please do not hesitate to call with questions or concerns.     D/w Dr Rubin and RN   D/w dialysis RN    Samaria Nava MD  [facility] Nephrology

## 2024-12-06 NOTE — PLAN OF CARE
Assumed car at 1930  Patient ie alert and oriented X 4  On RA, SR on tele  Continent of B&B  Received Gabapentin for neck and shoulder pains. Effective.  Had some st elevations over night, EKG completed, informed cardiologist with results, NNO at this time  Had MRI this am  HD this AM.   Call light within reach.   All needs met.

## 2024-12-06 NOTE — PROGRESS NOTES
[facility]  RAPID RESPONSE NOTE     Godwin Raymundo Patient Status:  Observation    1980 MRN [id number]   Location [facility] 7NE-A Attending Jhonny Rebolledo MD   Hosp Day # 0 PCP Adrian Small MD     Reason for RRT:  Chest pain during HD    Physical Exam:    BP (!) 165/109 (BP Location: Right arm)   Pulse 93   Temp 97.9 °F (36.6 °C) (Oral)   Resp (!) 28   Ht 6' 2\" (1.88 m)   Wt 236 lb 6.4 oz (107.2 kg)   SpO2 98%   BMI 30.35 kg/m²   General: awake, but eyes closed  Respiratory: CTAB  Cardiovascular: RRR  Abdomen: soft NT ND  Neurologic: all intact  Extremities: moving all 4, no edema, no cyanosis    Diagnostic Data:      Labs: trop    Imaging: EKG    ASSESSMENT / PLAN:     Chest pain during HD  -pt describes the pain as sharp and stabbing  -locating on left chest wall, radiating down  -stat trop ordered >>  -stat EKG ordered >>  -cards came to assess pt. 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          Ria Brown Hospitalist  Pager [phone]  Answering Service number: [phone]

## 2024-12-06 NOTE — CM/SW NOTE
Met w/pt at the bedside to discuss HH choice.  He said he wanted to discuss w/his mother.  Notified him choice would be needed prior to discharge and he verbalized understanding    / to remain available for support and/or discharge planning.     Heike HANDA MSN, RN CTL/  [phone]

## 2024-12-06 NOTE — PLAN OF CARE
A/Ox4, RA, NSR on tele; VSS.  Back pain addressed w/ PRN Tylenol.  Plan for HD tomorrow per Nephro   Plan for MRI w/o contrast still  Safety and comfort measures in place.  Pt updated on POC and discharge plan

## 2024-12-07 VITALS
TEMPERATURE: 98 F | RESPIRATION RATE: 21 BRPM | WEIGHT: 236.38 LBS | HEART RATE: 100 BPM | DIASTOLIC BLOOD PRESSURE: 71 MMHG | SYSTOLIC BLOOD PRESSURE: 110 MMHG | OXYGEN SATURATION: 98 % | HEIGHT: 74 IN | BODY MASS INDEX: 30.34 KG/M2

## 2024-12-07 LAB
ALBUMIN SERPL-MCNC: 4.4 G/DL (ref 3.2–4.8)
ANION GAP SERPL CALC-SCNC: 14 MMOL/L (ref 0–18)
ATRIAL RATE: 89 BPM
ATRIAL RATE: 94 BPM
BUN BLD-MCNC: 51 MG/DL (ref 9–23)
CALCIUM BLD-MCNC: 9.9 MG/DL (ref 8.7–10.4)
CHLORIDE SERPL-SCNC: 98 MMOL/L (ref 98–112)
CO2 SERPL-SCNC: 25 MMOL/L (ref 21–32)
CREAT BLD-MCNC: 8.5 MG/DL
EGFRCR SERPLBLD CKD-EPI 2021: 7 ML/MIN/1.73M2 (ref 60–?)
ERYTHROCYTE [DISTWIDTH] IN BLOOD BY AUTOMATED COUNT: 13.2 %
GLUCOSE BLD-MCNC: 107 MG/DL (ref 70–99)
GLUCOSE BLD-MCNC: 108 MG/DL (ref 70–99)
GLUCOSE BLD-MCNC: 159 MG/DL (ref 70–99)
HCT VFR BLD AUTO: 31.5 %
HGB BLD-MCNC: 11 G/DL
MAGNESIUM SERPL-MCNC: 2.3 MG/DL (ref 1.6–2.6)
MCH RBC QN AUTO: 31.7 PG (ref 26–34)
MCHC RBC AUTO-ENTMCNC: 34.9 G/DL (ref 31–37)
MCV RBC AUTO: 90.8 FL
OSMOLALITY SERPL CALC.SUM OF ELEC: 298 MOSM/KG (ref 275–295)
P AXIS: 37 DEGREES
P AXIS: 43 DEGREES
P-R INTERVAL: 196 MS
P-R INTERVAL: 198 MS
PHOSPHATE SERPL-MCNC: 7.6 MG/DL (ref 2.4–5.1)
PLATELET # BLD AUTO: 327 10(3)UL (ref 150–450)
POTASSIUM SERPL-SCNC: 4.2 MMOL/L (ref 3.5–5.1)
Q-T INTERVAL: 410 MS
Q-T INTERVAL: 412 MS
QRS DURATION: 106 MS
QRS DURATION: 108 MS
QTC CALCULATION (BEZET): 498 MS
QTC CALCULATION (BEZET): 515 MS
R AXIS: 23 DEGREES
R AXIS: 41 DEGREES
RBC # BLD AUTO: 3.47 X10(6)UL
SODIUM SERPL-SCNC: 137 MMOL/L (ref 136–145)
T AXIS: 145 DEGREES
T AXIS: 151 DEGREES
VENTRICULAR RATE: 89 BPM
VENTRICULAR RATE: 94 BPM
WBC # BLD AUTO: 6.5 X10(3) UL (ref 4–11)

## 2024-12-07 PROCEDURE — 99233 SBSQ HOSP IP/OBS HIGH 50: CPT | Performed by: OTHER

## 2024-12-07 PROCEDURE — 99221 1ST HOSP IP/OBS SF/LOW 40: CPT | Performed by: NEUROLOGICAL SURGERY

## 2024-12-07 RX ORDER — GABAPENTIN 100 MG/1
200 CAPSULE ORAL 3 TIMES DAILY
Qty: 180 CAPSULE | Refills: 0 | Status: SHIPPED | OUTPATIENT
Start: 2024-12-07

## 2024-12-07 RX ORDER — GABAPENTIN 100 MG/1
100 CAPSULE ORAL 3 TIMES DAILY
Qty: 90 CAPSULE | Refills: 0 | Status: SHIPPED | OUTPATIENT
Start: 2024-12-07 | End: 2024-12-07

## 2024-12-07 RX ORDER — NIFEDIPINE 30 MG
30 TABLET, EXTENDED RELEASE ORAL DAILY
Qty: 30 TABLET | Refills: 0 | Status: SHIPPED | OUTPATIENT
Start: 2024-12-07 | End: 2025-01-06

## 2024-12-07 RX ORDER — MORPHINE SULFATE 2 MG/ML
2 INJECTION, SOLUTION INTRAMUSCULAR; INTRAVENOUS ONCE
Status: COMPLETED | OUTPATIENT
Start: 2024-12-07 | End: 2024-12-07

## 2024-12-07 NOTE — CM/SW NOTE
Spoke to pt and mother, confirmed [facility] choice. Reserved in [facility], AVS updated. SW to send AVS once available. RN updated.    SW/CM to remain available for dc planning, and/or additional need for support.    JOSH Morocho  Discharge Planner  [id number]

## 2024-12-07 NOTE — PROGRESS NOTES
INPATIENT NEUROLOGY PROGRESS NOTE    Date: 12/7/2024    Godwin Fonseca is a 44 year old male at Hospital Day ( LOS: 0 days )    Assessment:   Overall condition:  stable   Principal Problem:    Weakness  Active Problems:    Stage 5 chronic kidney disease on chronic dialysis (HCC)    Nosebleed    S/p laminectomy  Lumbar disc disease  Lumbar stenosis with radiculopathy  Peripheral neuropathy may also play a role  Recommendations:  Increase neurontin 200 mg tid as tolerated  Ok to dc from neurological standpoint  I discussed with patient/family regarding all studies' results, assessment, treatment options and care plan.  I spent a total time of 35 minutes on patient care, with >50 % of the time was spent counseling patient/family and/or coordination of care regarding all studies' results, assessment, treatment options and care plan.  Will sign off, may follow up in clinic as needed    Subjective:   Subjective:   Mr. Fonseca reports no side effect or benefit from neurontin    Meds & History   MEDICATIONS:  Current Facility-Administered Medications   Medication Dose Route Frequency    carvedilol (Coreg) tab 25 mg  25 mg Oral BID with meals    NIFEdipine ER (Procardia-XL) 24 hr tab 30 mg  30 mg Oral Daily    nitroglycerin (Nitrostat) SL tab 0.4 mg  0.4 mg Sublingual Q5 Min PRN    gabapentin (Neurontin) cap 100 mg  100 mg Oral TID    sodium chloride 0.9 % IV bolus 100 mL  100 mL Intravenous Q30 Min PRN    And    albumin human (Albumin) 25% injection 25 g  25 g Intravenous PRN Dialysis    heparin (Porcine) 1000 UNIT/ML injection 1,500 Units  1.5 mL Intracatheter Once    ondansetron (Zofran) 4 MG/2ML injection 4 mg  4 mg Intravenous Q6H PRN    ondansetron (Zofran-ODT) disintegrating tab 4 mg  4 mg Oral Q6H PRN    sevelamer carbonate (Renvela) tab 2,400 mg  2,400 mg Oral TID CC    acetaminophen (Tylenol Extra Strength) tab 500 mg  500 mg Oral Q6H PRN    acetaminophen (Tylenol Extra Strength) tab 1,000 mg  1,000 mg Oral Q6H PRN     albuterol (Ventolin HFA) 108 (90 Base) MCG/ACT inhaler 2 puff  2 puff Inhalation Q6H PRN    buPROPion ER (Wellbutrin XL) 24 hr tab 150 mg  150 mg Oral Daily    fenofibrate micronized (Lofibra) cap 134 mg  134 mg Oral Nightly    FLUoxetine (PROzac) cap 40 mg  40 mg Oral Daily    fluticasone propionate (Flonase) 50 MCG/ACT nasal suspension 1 spray  1 spray Each Nare Daily    lamoTRIgine (LaMICtal) tab 100 mg  100 mg Oral Daily    memantine (Namenda) tab 5 mg  5 mg Oral BID    tamsulosin (Flomax) cap 0.4 mg  0.4 mg Oral Daily    heparin (Porcine) 5000 UNIT/ML injection 5,000 Units  5,000 Units Subcutaneous Q8H MARTHA    acetaminophen (Tylenol) tab 650 mg  650 mg Oral Q4H PRN    ARIPiprazole (Abilify) tab 15 mg  15 mg Oral Daily        Objective:    Objective:   Physical Exam:  /71   Pulse 100   Temp 98.2 °F (36.8 °C) (Oral)   Resp 21   Ht 74\"   Wt 236 lb 6.4 oz (107.2 kg)   SpO2 98%   BMI 30.35 kg/m²   Mental status: Oriented to person, place, situation and time   Speech: Fluent, no obvious aphasia or dysarthria  Memory and comprehension: Intact   Cranial Nerves: VFF, PERRL 3mm brisk, EOMI, no nystagmus, facial sensation intact, face symmetric, tongue midline, shoulder shrug equal, remainder CN intact  Motor: No drift, no focal arm weakness bilaterally, BLE weakness with motor exam 3-4/5, DTRs 0-1/4 throughout  Sensory: Intact to light touch  Coordination: Finger-to-nose test normal  Gait: Deferred       Labs and imaging/Diagnostic studies on 12/7/2024      Antonio \"Albert\" MD Shankar   Neurology  [facility]  12/7/2024, 2:49 PM  Adrian Small MD

## 2024-12-07 NOTE — PHYSICAL THERAPY NOTE
PHYSICAL THERAPY TREATMENT NOTE - INPATIENT    Room Number: 7616/7616-A     Session: 2 - add one more session to increase ambulation distance/confidence    Number of Visits to Meet Established Goals: 2    Presenting Problem: weakness, missed dialysis,  Co-Morbidities : ADD, bipolar, HTN, DVT/PE, DM, CKD, MGUS, TIA 2021    PHYSICAL THERAPY ASSESSMENT   Patient demonstrates good  progress this session, goals  remain in progress.      Patient is requiring modified independent and supervision as a result of the following impairments: pain.     Patient continues to function near baseline with bed mobility, transfers, and gait.  Next session anticipate patient to progress gait and standing prolonged periods.  Physical Therapy will continue to follow patient for duration of hospitalization.    Patient continues to benefit from continued skilled PT services: at discharge to promote prior level of function and safety with additional support and return home with home health PT.    PLAN DURING HOSPITALIZATION  Nursing Mobility Recommendation : 1 Assist  PT Device Recommendation: Rolling walker  PT Treatment Plan: Bed mobility;Body mechanics;Endurance;Energy conservation;Patient education;Family education;Gait training;Balance training;Transfer training;Strengthening;Neuromuscular re-educate  Frequency (Obs): 3x/week     CURRENT GOALS     Goal #1 Patient is able to demonstrate supine - sit EOB @ level: independent      Goal #2 Patient is able to demonstrate transfers EOB to/from Chair/Wheelchair at assistance level: supervision      Goal #3 Patient is able to ambulate 150 feet with assist device:  LRAD  at assistance level: supervision      Goal #4 Pt is able to ascend and descend stairs with supervision   Goal #5     Goal #6     Goal Comments: Goals established on 12/2/2024 12/7/2024 all goals ongoing    SUBJECTIVE  \"Yeah I have to see more doctors when I leave to get more answers\"    OBJECTIVE  Precautions: Bed/chair  alarm    WEIGHT BEARING RESTRICTION     PAIN ASSESSMENT   Rating: Unable to rate  Location: generalized       BALANCE                                                                                                                       Static Sitting: Good  Dynamic Sitting: Fair +           Static Standing: Fair -  Dynamic Standing: Poor +    ACTIVITY TOLERANCE                         O2 WALK       AM-PAC '6-Clicks' INPATIENT SHORT FORM - BASIC MOBILITY  How much difficulty does the patient currently have...  Patient Difficulty: Turning over in bed (including adjusting bedclothes, sheets and blankets)?: None   Patient Difficulty: Sitting down on and standing up from a chair with arms (e.g., wheelchair, bedside commode, etc.): None   Patient Difficulty: Moving from lying on back to sitting on the side of the bed?: None   How much help from another person does the patient currently need...   Help from Another: Moving to and from a bed to a chair (including a wheelchair)?: None   Help from Another: Need to walk in hospital room?: A Little   Help from Another: Climbing 3-5 steps with a railing?: A Little     AM-PAC Score:  Raw Score: 22   Approx Degree of Impairment: 20.91%   Standardized Score (AM-PAC Scale): 53.28   CMS Modifier (G-Code): CJ    FUNCTIONAL ABILITY STATUS  Gait Assessment   Functional Mobility/Gait Assessment  Gait Assistance: Supervision  Distance (ft): 100 - 50  Assistive Device: Rolling walker  Pattern: R Flexed knee;L Flexed knee (flexed posture - chair follow provided for confidence)    Skilled Therapy Provided    Bed Mobility:  Rolling: Mod I   Supine<>Sit: Mod I   Sit<>Supine: Mod I     Transfer Mobility:  Sit<>Stand: Mod I   Stand<>Sit: Mod I   Gait: SBA with RW - Pt stated he owns RW at home    Therapist's Comments: BP MALATHI - MD Azar present to read MRI, Pt stated he is still having pain and radicular symptoms.  All scans negative     Pt required max encouragement to ambulate in [facility] - RN  Александр aware of session findings      THERAPEUTIC EXERCISES  Lower Extremity Ankle pumps     Upper Extremity Elbow flex/ext     Position Sitting     Repetitions   Verbally encouraged   Sets        Patient End of Session: In bed;Needs met;Call light within reach;RN aware of session/findings;Hospital anti-slip socks;All patient questions and concerns addressed    PT Session Time: 10 minutes  Gait Training: 10 minutes  Therapeutic Activity: 0 minutes  Therapeutic Exercise: 0 minutes   Neuromuscular Re-education: 0 minutes

## 2024-12-07 NOTE — PLAN OF CARE
Assumed care at 1930   Patient is alert and oriented X 4  Pain control with Gabapentin  Had some pain in the neck and back area. Effective.  Call light within reach   VSS. All needs met.

## 2024-12-07 NOTE — CONSULTS
[facility] Neurosurgery Consult    Godwin Fonseca Patient Status:  Observation    1980 MRN [id number]   Location [facility] 7NE-A Attending Ria Gutierrez MD   Hosp Day # 0 PCP Adrian Small MD     REASON FOR CONSULTATION:  Lumbar stenosis    HISTORY OF PRESENT ILLNESS:  Godwin Fonseca is a(n) 44 year old male with PMH of CKD, CHF, COPD, DM, history of L4-5 laminectomy with Dr. Robison in 2018 who presents on 2024 with lightheadedness/weakness and low back pain radiating down the legs. MRI cervical/thoracic/lumbar obtained  which demonstrated mild foraminal stenosis at L4-5 for which neurosurgery was consulted    Patient seen and examined, he reports continued back and radiating left greater than right leg pain for few weeks.  Denies numbness/tingling or paresthesias to the upper or lower extremities.    PAST MEDICAL HISTORY:  Past Medical History:    Anxiety state    Arrhythmia    Asthma ([facility])    Attention deficit hyperactivity disorder (ADHD)    Back problem    Bipolar 1 disorder ([facility])    CKD (chronic kidney disease) stage 3, GFR 30-59 ml/min ([facility])    Dr Meeks    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[facility])    Congestive heart disease (HCC)    COPD (chronic obstructive pulmonary disease) ([facility])    Coronary atherosclerosis    Deep vein thrombosis ([facility])    at age 17 R/T cast    Depression    Diabetes ([facility])    Dialysis patient ([facility])    Diverticulosis of large intestine    Essential hypertension    3/21 echo: severe concentric LVH with normal EF and no MR or pHTN    Extrinsic asthma, unspecified    Heart attack (HCC)    - angiogram- no intervention    Heart valve disease    mitral valve repair in /    High blood pressure    High cholesterol    History of blood transfusion    History of mitral valve repair    Hyperlipidemia    Low back pain    tight and stiff after sweeping and mopping    LVH (left ventricular hypertrophy)    Migraines    Mixed hyperlipidemia     HDL 38 LDL 97 VLDL 57     Monoclonal  gammopathy    IgG kappa     Muscle weakness    MVP (mitral valve prolapse)    Repair 1994 at [place]; echoes as recently as 3/21 show mild or trivial MR and no stenosis    Neuropathy    Osteoarthritis    hip ,knees    Pneumonia due to organism    Pulmonary embolism (HCC)    Renal disorder    Stroke (HCC)    TIA (transient ischemic attack)    Initial history of left-sided weakness and slurred speech. (+) cocaine. MRI of the brain, CT angiogram of the head and neck, and 2D echo are all unremarkable.     TMJ (dislocation of temporomandibular joint)    Troponin level elevated    Trop 60 60 47 with TIA and no CP: Lexiscan negative with EF 51    Visual impairment       PAST SURGICAL HISTORY:  Past Surgical History:   Procedure Laterality Date    Av fistula revision, open Left     Cabg      Colonoscopy N/A 03/26/2023    Procedure: COLONOSCOPY;  Surgeon: Heath Vu MD;  Location:  ENDOSCOPY    Colonoscopy N/A 12/30/2023    Procedure: COLONOSCOPY with cold snare polypectomy and forcep polypectomy;  Surgeon: Ousmane Suarez MD;  Location:  ENDOSCOPY    Colonoscopy & polypectomy  2019    Egd  2019    Duodenitis. Biopsied. EUS for weight loss was negative    Heart surgery      Hernia surgery  08/17/2022    Dr Barnes    Laminectomy,>2 sgmt,lumbar  09/06/2018    L4-L5 Decomp Discectomy ROEM L4-L5    Mitralplasty w cp bypass  1994    [place]: Repair    Repair rotator cuff,chronic Left     torn and had a ruptured bicep    Sinus surgery        Spine surgery procedure unlisted      Valve repair  1994    mitral valve       FAMILY HISTORY:  family history includes Alcohol and Other Disorders Associated in his father and maternal grandfather; Anxiety in his maternal aunt and maternal aunt; Bipolar Disorder in his maternal aunt; Cancer in his father, maternal grandfather, maternal uncle, mother, paternal aunt, and sister; Dementia in his father; Depression in his maternal aunt and maternal aunt; Diabetes in his maternal  grandfather, maternal grandmother, and mother; Hypertension in his father, maternal grandfather, maternal grandmother, mother, paternal grandfather, paternal grandmother, and sister; Substance Abuse in his father.    SOCIAL HISTORY:   reports that he quit smoking about 2 years ago. His smoking use included cigarettes. He started smoking about 29 years ago. He has a 27 pack-year smoking history. He has never been exposed to tobacco smoke. He has never used smokeless tobacco. He reports that he does not drink alcohol and does not use drugs.    ALLERGIES:  Allergies[1]    MEDICATIONS:  Prescriptions Prior to Admission[2]  Current Facility-Administered Medications   Medication Dose Route Frequency    carvedilol (Coreg) tab 25 mg  25 mg Oral BID with meals    NIFEdipine ER (Procardia-XL) 24 hr tab 30 mg  30 mg Oral Daily    nitroglycerin (Nitrostat) SL tab 0.4 mg  0.4 mg Sublingual Q5 Min PRN    gabapentin (Neurontin) cap 100 mg  100 mg Oral TID    sodium chloride 0.9 % IV bolus 100 mL  100 mL Intravenous Q30 Min PRN    And    albumin human (Albumin) 25% injection 25 g  25 g Intravenous PRN Dialysis    heparin (Porcine) 1000 UNIT/ML injection 1,500 Units  1.5 mL Intracatheter Once    ondansetron (Zofran) 4 MG/2ML injection 4 mg  4 mg Intravenous Q6H PRN    ondansetron (Zofran-ODT) disintegrating tab 4 mg  4 mg Oral Q6H PRN    sevelamer carbonate (Renvela) tab 2,400 mg  2,400 mg Oral TID CC    acetaminophen (Tylenol Extra Strength) tab 500 mg  500 mg Oral Q6H PRN    acetaminophen (Tylenol Extra Strength) tab 1,000 mg  1,000 mg Oral Q6H PRN    albuterol (Ventolin HFA) 108 (90 Base) MCG/ACT inhaler 2 puff  2 puff Inhalation Q6H PRN    buPROPion ER (Wellbutrin XL) 24 hr tab 150 mg  150 mg Oral Daily    fenofibrate micronized (Lofibra) cap 134 mg  134 mg Oral Nightly    FLUoxetine (PROzac) cap 40 mg  40 mg Oral Daily    fluticasone propionate (Flonase) 50 MCG/ACT nasal suspension 1 spray  1 spray Each Nare Daily     lamoTRIgine (LaMICtal) tab 100 mg  100 mg Oral Daily    memantine (Namenda) tab 5 mg  5 mg Oral BID    tamsulosin (Flomax) cap 0.4 mg  0.4 mg Oral Daily    heparin (Porcine) 5000 UNIT/ML injection 5,000 Units  5,000 Units Subcutaneous Q8H MARTHA    acetaminophen (Tylenol) tab 650 mg  650 mg Oral Q4H PRN    ARIPiprazole (Abilify) tab 15 mg  15 mg Oral Daily       REVIEW OF SYSTEMS:  Comprehensive Review of Systems obtained, and is negative other than that mentioned in the History of Present Illness.      PHYSICAL EXAMINATION:  VITAL SIGNS: /71   Pulse 100   Temp 98.2 °F (36.8 °C) (Oral)   Resp 21   Ht 74\"   Wt 236 lb 6.4 oz (107.2 kg)   SpO2 98%   BMI 30.35 kg/m²   GENERAL:  Patient is a 44 year old male in no acute distress.  HEENT:  Normocephalic, atraumatic    NEUROLOGICAL:  This patient is alert and orientated x 3.  Speech fluent. Comprehension intact.   Upper extremity strength is 5/5 bilaterally. Lower extremity strength is 5/5 bilaterally.     DIAGNOSTIC DATA:   Lab Results   Component Value Date    WBC 6.5 12/07/2024    HGB 11.0 12/07/2024    HCT 31.5 12/07/2024    .0 12/07/2024    CREATSERUM 8.50 12/07/2024    BUN 51 12/07/2024     12/07/2024    K 4.2 12/07/2024    CL 98 12/07/2024    CO2 25.0 12/07/2024     12/07/2024    CA 9.9 12/07/2024    ALB 4.4 12/07/2024    MG 2.3 12/07/2024    PHOS 7.6 12/07/2024    PGLU 159 12/07/2024       IMAGING:  MRI CERVICAL+THORACIC+LUMBAR SPINE  (CPT=72141/92753/45155)    Result Date: 12/6/2024  CONCLUSION:  Postsurgical changes of presumed prior laminectomy noted at L4-5 with a moderate diffuse disc bulge.  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  There is mild central canal stenosis noted at C5-6 and C6-7 with mild left neural foraminal stenosis favored at C5-6.     LOCATION:  [facility]   Dictated by (CST): Smith Randle MD on  12/06/2024 at 7:31 AM     Finalized by (CST): Smith Randle MD on 12/06/2024 at 8:25 AM       XR CHEST AP PORTABLE  (CPT=71045)    Result Date: 12/2/2024  CONCLUSION:  See above.   LOCATION:  [id number]      Dictated by (CST): Smith Randle MD on 12/02/2024 at 7:46 PM     Finalized by (CST): Smith Randle MD on 12/02/2024 at 7:48 PM         ASSESSMENT:  Godwin Fonseca is a very pleasant 45 y/o male who presents with LE subjective weakness, low back pain, radiating leg pain.  Found to have mild central stenosis at C5-6 and C6-7, mild bilateral neuroforaminal stenosis at L4-5    Plan:    1.  Patient is neurologically intact.  There is no significant pathology on MRIs of the entire spine.  There is no role for acute neurosurgical intervention.  2.  Neurology following for subjective weakness   3.  PT/OT/OOB  4.  Patient is appropriate for discharge from a neurosurgical standpoint.  No need for neurosurgery follow-up.  5. Medical management per hospitalist     Patient seen and examined with Dr. Azar. Patient agrees with the plan and all questions were answered.       Marcy Araujo M.S., PA-C  [address], [address] [place] [address]  [phone]  12/7/2024 1:54 PM    Patient seen examined with PA  Case discussed  Patient with history of back and neck pain  Patient recently had MRIs  His MRIs are fairly clean  He describes feeling a little weak  On examination he is pretty strong  No acute neurosurgical intervention needed  Recommend neurology follow-up for the weakness  If they find the other further issues they can call us  All questions were answered  Patient appreciative       [1]   Allergies  Allergen Reactions    Hydrochlorothiazide RASH and HIVES   [2]   Medications Prior to Admission   Medication Sig Dispense Refill Last Dose/Taking    HYDROcodone-acetaminophen 5-325 MG Oral Tab Take 1 tablet by mouth every 4 (four) hours as needed. 15 tablet 0 Past Week    hydrocortisone 25 MG  Rectal Suppos Place 1 suppository (25 mg total) rectally 2 (two) times daily. 60 suppository 0 11/30/2024 Bedtime    VYVANSE 60 MG Oral Cap Take 1 capsule (60 mg total) by mouth every morning.   12/1/2024 Morning    lamoTRIgine 100 MG Oral Tab Take 1 tablet (100 mg total) by mouth daily.   12/1/2024 Morning    hydrALAZINE 50 MG Oral Tab Take 1 tablet (50 mg total) by mouth every 8 (eight) hours. 90 tablet 0 12/1/2024 Morning    memantine 5 MG Oral Tab Take 1 tablet (5 mg total) by mouth 2 (two) times daily.   12/1/2024 Morning    albuterol 108 (90 Base) MCG/ACT Inhalation Aero Soln Inhale 2 puffs into the lungs every 6 (six) hours as needed for Wheezing.   11/30/2024 Morning    tamsulosin 0.4 MG Oral Cap Take 1 capsule (0.4 mg total) by mouth daily.   12/1/2024 Morning    ARIPiprazole 15 MG Oral Tab Take 1 tablet (15 mg total) by mouth daily.   12/1/2024 Morning    buPROPion  MG Oral Tablet 24 Hr Take 1 tablet (150 mg total) by mouth daily.   12/1/2024 Morning    FLUoxetine HCl 40 MG Oral Cap Take 1 capsule (40 mg total) by mouth daily. 7 capsule 0 12/1/2024 Morning    RENVELA 800 MG Oral Tab Take 1 tablet (800 mg total) by mouth 3 (three) times daily with meals.   12/1/2024 Morning    losartan 100 MG Oral Tab Take 1 tablet (100 mg total) by mouth daily. Hold if systolic blood pressure <130   12/1/2024 Morning    NIFEdipine ER 60 MG Oral Tablet 24 Hr Take 1 tablet (60 mg total) by mouth in the morning and 1 tablet (60 mg total) before bedtime. Hold if systolic blood pressure <130.   12/1/2024 Morning    carvedilol 25 MG Oral Tab Take 1 tablet (25 mg total) by mouth in the morning and 1 tablet (25 mg total) in the evening. Take with meals. 60 tablet 6 12/1/2024 Morning    Fenofibrate 134 MG Oral Cap Take 1 capsule by mouth nightly. 90 capsule 1 12/1/2024 Morning    Fluticasone Propionate 50 MCG/ACT Nasal Suspension SPRAY ONCE INTO EACH NOSTRIL BID PRN 15.8 mL 0 12/1/2024 Morning

## 2024-12-07 NOTE — PROGRESS NOTES
[facility] Cardiology Progress Note    Godwin Fonseca Patient Status:  Observation    1980 MRN [id number]   Location [facility] 7NE-A Attending Ria Gutierrez MD   Hosp Day # 0 PCP Adrian Small MD     Outpatient cardiologist: [facility] (Kevin Lockwood)  Reason for initial consult: elevated troponins (admitted for weakness and nosebleed) chest pain during HD    Subjective:  Hard night last night.  A lot of pain .    Denies chest pain at present.     Assessment and Plan:    Chronic troponin elevation: 85 down from  255. Lower than last admission which was >2300  Cath  for elevated troponin with mild irregularities  Non obstructive CAD no additional cardiac testing   h/o severe MR s/p MVR , redo complex robotically assisted MVR ([email or web address]) mild to moderate on echo will monitor  Chronic HFpEF: euvolemic   HTN: now with multiple positive orthostatic blood pressures. Improved over the past 2 days  Bipolar disease  ESRD on HD  DVT/PE  s/p 6 month anticoagulation   History TIA   Anemia per renal Epo    No changes at this time.  Monitor for symptoms. Monitor HR     Medications:   carvedilol  25 mg Oral BID with meals    NIFEdipine ER  30 mg Oral Daily    gabapentin  100 mg Oral TID    heparin  1.5 mL Intracatheter Once    sevelamer carbonate  2,400 mg Oral TID CC    buPROPion ER  150 mg Oral Daily    fenofibrate micronized  134 mg Oral Nightly    FLUoxetine  40 mg Oral Daily    fluticasone propionate  1 spray Each Nare Daily    lamoTRIgine  100 mg Oral Daily    memantine  5 mg Oral BID    tamsulosin  0.4 mg Oral Daily    heparin  5,000 Units Subcutaneous Q8H MARTHA    ARIPiprazole  15 mg Oral Daily       Continuous Infusions:      Allergies:  Allergies[1]    Intake/Output:    Intake/Output Summary (Last 24 hours) at 2024 0845  Last data filed at 2024 1500  Gross per 24 hour   Intake 120 ml   Output --   Net 120 ml       Last 3 Weights   24 1616 236 lb 6.4 oz (107.2 kg)    12/02/24 0442 242 lb 9.6 oz (110 kg)   12/01/24 0459 240 lb (108.9 kg)   12/01/24 0148 240 lb (108.9 kg)   12/03/24 1611 236 lb 12.8 oz (107.4 kg)   11/19/24 0519 245 lb 9.5 oz (111.4 kg)   11/18/24 0525 240 lb 11.9 oz (109.2 kg)   11/16/24 1723 240 lb 15.4 oz (109.3 kg)   11/16/24 1236 240 lb (108.9 kg)       Physical Exam:  Temp:  [97.9 °F (36.6 °C)-98.4 °F (36.9 °C)] 98.1 °F (36.7 °C)  Pulse:  [] 104  Resp:  [13-25] 25  BP: (119-168)/() 148/99  SpO2:  [95 %-99 %] 98 %    General: No apparent distress  HEENT: No focal deficits.  Neck: supple. JVP normal  Cardiac: Regular rhythm, S1, S2 normal, no rub or gallop.  No murmur.  Lungs: Clear bilaterally  Abdomen: Soft, non-tender.   Extremities: No LE edema, radial pulses 2+ and pedal pulses 2+  Neurologic: no focal deficits  Skin: Warm and dry.     Telemetry: NSR rare PVCs  Echo:  Conclusions:     1. Left ventricle: The cavity size was normal. Wall thickness was moderately      increased. Systolic function was normal. The estimated ejection fraction      was 60-65%, by visual assessment. No diagnostic evidence for regional      wall motion abnormalities. Features are consistent with a pseudonormal      left ventricular filling pattern, with concomitant abnormal relaxation      and increased filling pressure - grade 2 diastolic dysfunction.   2. Left atrium: The atrium was markedly dilated. The left atrial volume was      markedly increased.   3. Mitral valve: There was mild to moderate regurgitation, with multiple      jets. The mean diastolic gradient was 3mm Hg at a heart rate of 78 bpm.   *     Labs:  HEM:  Recent Labs   Lab 12/03/24  0558 12/04/24  0644 12/05/24  1318 12/06/24  0729 12/07/24  0616   WBC 5.3 5.4 5.3 5.7 6.5   HGB 9.7* 11.9* 10.9* 10.3* 11.0*   .0 314.0 286.0 303.0 327.0       Chem:  Recent Labs   Lab 12/03/24  0558 12/04/24  0644 12/05/24  1318 12/06/24  0729 12/07/24  0616    138 135* 136 137   K 4.4 4.8 4.1 4.2 4.2   CL  100 94* 92* 99 98   CO2 25.0 30.0 28.0 22.0 25.0   BUN 61* 44* 67* 71* 51*   CREATSERUM 9.68* 8.40* 10.65* 11.71* 8.50*   CA 9.0 10.2 10.1 9.7 9.9   MG 2.3 2.4 2.6 2.4 2.3   PHOS 7.5* 8.0* 8.7* 9.4* 7.6*   * 87 112* 76 108*       Recent Labs   Lab 12/01/24  0210 12/02/24  0544 12/03/24  0558 12/04/24  0644 12/05/24  1318 12/06/24  0729 12/07/24  0616   ALT 11 9*  --   --   --   --   --    AST 19 17  --   --   --   --   --    ALB 4.4 4.2  4.2 3.9 4.6 4.1 4.1 4.4       No results for input(s): \"TROP\", \"CK\" in the last 168 hours.    No results for input(s): \"PTP\", \"INR\" in the last 168 hours.      Cata Rodriguez, DONTRELL  12/7/2024  8:45 AM         [1]   Allergies  Allergen Reactions    Hydrochlorothiazide RASH and HIVES

## 2024-12-07 NOTE — PLAN OF CARE
A/Ox4, RA, NSR on tele; VSS.  Back pain addressed w/ PRN Tylenol.  HD completed. 2.1 L of fluids removed   Plan for MRI w/o contrast still  C/O severe sharp pain to left chest   Rapid response called for possible MI  ASA 325mg and 0.4mg of nitroglycerin given  Safety and comfort measures in place. Call light in reach  Patient updated on Plan of care and discharge plan

## 2024-12-07 NOTE — PLAN OF CARE
A/Ox4, RA,  NSR on tele; VSS.  Back pain addressed w/ PRN Tylenol.  Safety and comfort measures in place. Call light in reach  Patient updated on Plan of care and discharge plan  Patient discharged to home with home health

## 2024-12-07 NOTE — PROGRESS NOTES
[facility] Hospitalist Progress Note     PCP: Adrian Small MD    Chief Complaint: follow-up   Follow up for: The primary encounter diagnosis was Weakness. Diagnoses of Stage 5 chronic kidney disease on chronic dialysis (HCC) and Nosebleed were also pertinent to this visit.    Overnight/Interim Events:      SUBJECTIVE:  Pt seen and examined  Pt feels tired. Lying on his side  Had RRT for chest pain - found to be non-cardiac yest  This am says he feels better  Still weak  No chest pain, palpitations, shortness of breath, nausea, vomiting, abdominal pain.     OBJECTIVE:  Temp:  [97.9 °F (36.6 °C)-98.4 °F (36.9 °C)] 98.1 °F (36.7 °C)  Pulse:  [] 104  Resp:  [13-25] 25  BP: (119-168)/() 148/99  SpO2:  [95 %-99 %] 98 %    Intake/Output:    Intake/Output Summary (Last 24 hours) at 12/7/2024 0850  Last data filed at 12/6/2024 1500  Gross per 24 hour   Intake 120 ml   Output --   Net 120 ml         Last 3 Weights   12/03/24 1616 236 lb 6.4 oz (107.2 kg)   12/02/24 0442 242 lb 9.6 oz (110 kg)   12/01/24 0459 240 lb (108.9 kg)   12/01/24 0148 240 lb (108.9 kg)   12/03/24 1611 236 lb 12.8 oz (107.4 kg)   11/19/24 0519 245 lb 9.5 oz (111.4 kg)   11/18/24 0525 240 lb 11.9 oz (109.2 kg)   11/16/24 1723 240 lb 15.4 oz (109.3 kg)   11/16/24 1236 240 lb (108.9 kg)       Exam    General: Alert, no distress, appears stated age.     Lungs:   Clear to auscultation bilaterally. Normal effort, no crackles, no wheezing   Heart:  Regular rate and rhythm, S1, S2 normal, no murmur,   Abdomen:   Soft, NT/ND, Bowel sounds normal. Norebound or guarding   Extremities: Extremities normal, atraumatic, no LE edema.   Neurologic: Moving all extremities spontaneously, no focal deficit appreciated      Data Review:       Labs:     Recent Labs   Lab 12/03/24  0558 12/04/24  0644 12/05/24  1318 12/06/24  0729 12/07/24  0616   WBC 5.3 5.4 5.3 5.7 6.5   HGB 9.7* 11.9* 10.9* 10.3* 11.0*   MCV 91.3 91.8 88.5 89.5 90.8   .0 314.0 286.0 303.0  327.0       Recent Labs   Lab 12/03/24  0558 12/04/24  0644 12/05/24  1318 12/06/24  0729 12/07/24  0616    138 135* 136 137   K 4.4 4.8 4.1 4.2 4.2    94* 92* 99 98   CO2 25.0 30.0 28.0 22.0 25.0   BUN 61* 44* 67* 71* 51*   CREATSERUM 9.68* 8.40* 10.65* 11.71* 8.50*   CA 9.0 10.2 10.1 9.7 9.9   MG 2.3 2.4 2.6 2.4 2.3   PHOS 7.5* 8.0* 8.7* 9.4* 7.6*   * 87 112* 76 108*       Recent Labs   Lab 12/01/24  0210 12/02/24  0544 12/03/24  0558 12/04/24  0644 12/05/24  1318 12/06/24  0729 12/07/24  0616   ALT 11 9*  --   --   --   --   --    AST 19 17  --   --   --   --   --    ALB 4.4 4.2  4.2 3.9 4.6 4.1 4.1 4.4       Recent Labs   Lab 12/06/24  1129 12/06/24  1628 12/06/24  2021 12/06/24  2107 12/07/24  0541   PGLU 146* 133* 124* 118* 107*       No results for input(s): \"TROP\" in the last 168 hours.      Meds:      carvedilol  25 mg Oral BID with meals    NIFEdipine ER  30 mg Oral Daily    gabapentin  100 mg Oral TID    heparin  1.5 mL Intracatheter Once    sevelamer carbonate  2,400 mg Oral TID CC    buPROPion ER  150 mg Oral Daily    fenofibrate micronized  134 mg Oral Nightly    FLUoxetine  40 mg Oral Daily    fluticasone propionate  1 spray Each Nare Daily    lamoTRIgine  100 mg Oral Daily    memantine  5 mg Oral BID    tamsulosin  0.4 mg Oral Daily    heparin  5,000 Units Subcutaneous Q8H MARTHA    ARIPiprazole  15 mg Oral Daily         nitroglycerin    sodium chloride **AND** albumin human    ondansetron    ondansetron    acetaminophen    acetaminophen    albuterol    acetaminophen **OR** [DISCONTINUED] HYDROcodone-acetaminophen **OR** [DISCONTINUED] HYDROcodone-acetaminophen       Assessment/Plan:     44 year old male with PMH including but not limited to ADD, bipolar, HTN, DVT/PE, hyperlipidemia, DM, CKD on dialysis, MGUS, cervical spondylosis, and TIA in 2021 who p/t  ED c weakness and missed HD.     Weakness  - likely 2/2 missed HD, stressed need to go to HD even if weak as weiralph only make  him weaker; BP meds possibly contributing as well   - discussed narcotics can cause lethargy/drowsiness, agreeable to hold norco and just try tylenol   - PT/OT--> home therapy   - has seen as o/p Vishal Hurt MD on 11/06/2024.   -CT head and MRI brain negative 11/16  -neuro following --> mri spine ordered >> result  pending     ESRD  - per renal, plan HD T and W, got Sun     Elevated trop  - has had before, chronic   - apprec cards recs      Hyperphosphatemia:  -- sevelamer 2400 TID AC     Non-obstructive minimal CAD on cath 4/24  H/o severe MR s/p MVR, redo complex robotically assisted MVR 2022  Chronic HFpEF  HTN  - Hold hydralazine, losartan, and nifedipine given lower BPs and orthostatsis and weakness; coreg ok, dose lowered. Follow BP and sxs, d/w Yesenia Rubin MD   - pt reports Low BPs at home 80/60s  - echo EF 60-65%, G2DD  - h/o cath 2023 with mild irregularities     Diaphoresis  - noted later in day, d/w MD Brandy   - check Blood cxs peripherally and from line, PCT, LA, CXR, UA (if makes urine), CXR; temp/WBC ok--> W/U currently neg  - drsg for line when came in not optimal     Anemia  Nose bleeds  - 2/2 ESRD  - trend     Finding of possible \"cystitis\" on imaging  - seen on previous CT as well  -Patient has had cystoscopy that looked okay in the past per his report.  He also has been treated for possible prostatitis.  He does seem to have more chronic discomfort in the suprapubic area.     LLQ lump- lipoma versus other subcutaneous nodule (?could it be from previous heparin injection site)  -No findings on imaging to suggest a deeper process.    - o/p f/u       # Cervical radiculopathy  -Pain control as needed  -Follow-up with spine surgeon  -MRI spine ordered per neuro      # Bipolar disorder  -Continue Abilify, bupropion, Lamictal   - follows c pyschiatrist, reports dose adjustment in meds     # Depression  -Continue fluoxetine      #COPD, chronic  -Continue home inhalers    #DVT  Proph  -heparin sq     DISPO  Dc planning in process  Possible dc today  Nephro, cards following  PT/OT      Ria Gutierrez MD  [facility]  Pager [phone]  Answering Service number: [phone]

## 2024-12-07 NOTE — DISCHARGE INSTRUCTIONS
Sometimes managing your health at home requires assistance.  The [facility] team has recognized your preference to use [facility] (P:[phone]). A representative from the home health agency will contact you or your family to schedule your first visit.

## 2024-12-08 NOTE — PROGRESS NOTES
[facility]   part of [facility]    Nephrology Progress Note    Godwin Fonseca Attending:  Yuriy Forrest MD     Cc: ESRD    SUBJECTIVE     No complaints  Sp MRI    PHYSICAL EXAM   Vital signs: /71   Pulse 100   Temp 98.2 °F (36.8 °C) (Oral)   Resp 21   Ht 6' 2\" (1.88 m)   Wt 236 lb 6.4 oz (107.2 kg)   SpO2 98%   BMI 30.35 kg/m²   Temp (24hrs), Av.3 °F (36.8 °C), Min:98.1 °F (36.7 °C), Max:98.4 °F (36.9 °C)     No intake or output data in the 24 hours ending 24      Wt Readings from Last 3 Encounters:   24 236 lb 6.4 oz (107.2 kg)   24 236 lb 12.8 oz (107.4 kg)   24 245 lb 9.5 oz (111.4 kg)     General: NAD  HEENT: NCAT, EOMI, MMM  Neck: Supple   Cardiac: Regular rate and rhythm   Lungs: CTAB  Abdomen: Soft, non-tender  Extremities: No edema  Neurologic: No asterxis  Skin: Warm and dry, no rashes     MEDS     No current facility-administered medications for this encounter.       LABS     Lab Results   Component Value Date    WBC 6.5 2024    HGB 11.0 2024    HCT 31.5 2024    .0 2024    CREATSERUM 8.50 2024    BUN 51 2024     2024    K 4.2 2024    CL 98 2024    CO2 25.0 2024     2024    CA 9.9 2024    ALB 4.4 2024    MG 2.3 2024    PHOS 7.6 2024    PGLU 159 2024       IMAGING   All imaging studies personally reviewed.    MRI CERVICAL+THORACIC+LUMBAR SPINE  (CPT=72141/48342/44369)   Final Result   PROCEDURE:  MRI CERVICAL+THORACIC+LUMBAR SPINE (CPT=72141/47359/20979)       COMPARISON:  [facility], MRI SPINE CERVICAL (CPT=72141), 10/19/2024, 8:25    PM.       INDICATIONS:  Back pain       TECHNIQUE:  A comprehensive examination was performed utilizing a variety    of imaging planes and imaging parameters to optimize visualization of    suspected pathology.  Images were performed without intravenous contrast.       PATIENT STATED HISTORY: (As  transcribed by Technologist)  Pt stated that    he developed lower back pain that radiates down his legs.            FINDINGS:  Please note that the exam is somewhat limited due to motion.       Cervical spine:       There is straightening of the normal cervical lordosis.  No significant    spondylolisthesis is present.  Vertebral bodies appear maintained in    height.  Intervertebral disc spaces appear overall maintained.       No masses or fluid collections are present within the cervical spinal    canal.  The cervical spinal cord is normal in course and caliber.  No    areas of cord signal abnormality are seen.       Posterior disc osteophyte complex noted at C5-6 and C6-7 with overall mild    areas of central canal stenosis present.  This is similar to the prior    exam accounting for differences in technique.  Overall mild left neural    foraminal stenosis is favored at    C5-6.       Paravertebral soft tissues are unremarkable in appearance.       Thoracic spine:       The exam is significantly limited due to the degree of motion.  There is    normal thoracic kyphosis.  No significant spondylolisthesis is present.     Vertebral bodies appear maintained in height.  No definite cord signal    abnormality is seen.       Within the limitations of the exam no significant central canal or neural    foraminal stenosis is seen within the thoracic spine.  The visualized    paravertebral soft tissues are unremarkable in appearance.       Lumbar spine:       There is normal lumbar lordosis.  No significant spondylolisthesis is    present.  Vertebral bodies appear maintained in height.  Moderate    intervertebral disc space narrowing with diffuse disc desiccation noted at    L4-5 and L5-S1.  The distal spinal cord is    normal in course and caliber.  No areas of cord signal abnormality are    seen.       No evidence of significant central canal or neural foraminal stenosis    present at L1-2, L2-3.  At L3-4 there is a  minimal diffuse disc bulge with    mild facet arthropathy resulting in overall minimal central canal    stenosis.  No significant neural foraminal    stenosis is seen.       At L4-5 postsurgical changes of prior laminectomy are noted.  A moderate    diffuse disc bulge is present.  Overall mild bilateral neural foraminal    stenosis is favored.  No significant central canal stenosis is seen.     There is some clumping of nerve roots    bilaterally in this region which may represent arachnoiditis.       L5-S1 there is a mild diffuse disc bulge present with mild facet    arthropathy.  No significant central canal stenosis is seen.  There is no    significant neural foraminal stenosis.       Paravertebral soft tissues are unremarkable in appearance.                          =====   CONCLUSION:  Postsurgical changes of presumed prior laminectomy noted at    L4-5 with a moderate diffuse disc bulge.  Overall mild bilateral neural    foraminal stenosis is favored with some clumping of the nerve roots    bilaterally in this region, which may    represent arachnoiditis.       There is mild central canal stenosis noted at C5-6 and C6-7 with mild left    neural foraminal stenosis favored at C5-6.                   LOCATION:  [facility]           Dictated by (CST): Smith Randle MD on 12/06/2024 at 7:31 AM        Finalized by (CST): Smith Randle MD on 12/06/2024 at 8:25 AM         XR CHEST AP PORTABLE  (CPT=71045)   Final Result   PROCEDURE:  XR CHEST AP PORTABLE  (CPT=71045)       TECHNIQUE:  AP chest radiograph was obtained.       COMPARISON:  [facility], XR, XR CHEST AP PORTABLE  (CPT=71045),    11/06/2024, 12:20 PM.       INDICATIONS:  Diaphoresis        PATIENT STATED HISTORY: (As transcribed by Technologist)  Patient offered    no additional history at this time.             FINDINGS:  Support devices appear overall stable.  Small right pleural    effusion is favored.  A background of mild vascular congestion and volume    overload  is likely present.  Consolidation at the right lung base is    difficult to entirely exclude.  If there    is persistent concern then consider follow-up imaging.                         =====   CONCLUSION:  See above.           LOCATION:  [place] [id number]                       Dictated by (CST): Smith Randle MD on 12/02/2024 at 7:46 PM        Finalized by (CST): Smith Randle MD on 12/02/2024 at 7:48 PM               ASSESSMENT & PLAN   44 year old male with history of ESRD on iHD MWF via [facility] CVC, LUE AVF, hyperaldosteronism, DM, bipolar disorder, recurrent GI bleed, pneumonia recently presented with weakness and nose bleeds    ESRD:  -- sp HD Sunday due to missed Friday.   -- sp HD 12/3/24. Hold off on HD today given under TW (240) and labs stable.   -- sp HD yesterday to get back on MWF schedule  Noted chest pain and HA on HD, rapid called, blood returned and HD paused. BP did not drop on HD.   Was evaluated by cards and my partner d/w cards at length. Trops down. They do not think it is cardiac in origin. May be due to his spinal etiology. Ok to resume HD per cards. Will plan to remove less UF but resume. Pt did resume HD with no further pain, tolerated 2.1L but not more due to cramping at the end of the treatment   -- continue MWF HD schedule. No acute HD indication. Asymptomatic. Lytes/volume ok.   -- Will use catheter here (has AVF but unclear what size needles they are using as outpt).    -- getting MRI, if using pavan let nephrology know   -- avoid class 1 gadolinium agents     Anemia:  -- epo with HD if BP controlled     Hyperphosphatemia:  -- sevelamer 2400 TID AC. low phos diet. May need to add phoslo     HTN:  -- historically has had high BPs, and then liable. Was low the other day, may meds on hold, now resuming, adjustments per cards appreciated      Ok to dc from renal standpoint. Continue MWF dialysis outpatient.    Karen Jalloh MD  [facility]  Nephrology

## 2024-12-17 ENCOUNTER — APPOINTMENT (OUTPATIENT)
Dept: GENERAL RADIOLOGY | Age: 44
End: 2024-12-17
Attending: EMERGENCY MEDICINE
Payer: MEDICARE

## 2024-12-17 ENCOUNTER — HOSPITAL ENCOUNTER (OUTPATIENT)
Facility: HOSPITAL | Age: 44
Setting detail: OBSERVATION
Discharge: HOME | End: 2024-12-21
Attending: EMERGENCY MEDICINE | Admitting: STUDENT IN AN ORGANIZED HEALTH CARE EDUCATION/TRAINING PROGRAM
Payer: MEDICARE

## 2024-12-17 DIAGNOSIS — N18.9 CHRONIC RENAL FAILURE, UNSPECIFIED CKD STAGE: ICD-10-CM

## 2024-12-17 DIAGNOSIS — K92.2 GASTROINTESTINAL HEMORRHAGE, UNSPECIFIED GASTROINTESTINAL HEMORRHAGE TYPE: Primary | ICD-10-CM

## 2024-12-17 LAB
ALBUMIN SERPL-MCNC: 4.5 G/DL (ref 3.2–4.8)
ALBUMIN/GLOB SERPL: 1.5 {RATIO} (ref 1–2)
ALP LIVER SERPL-CCNC: 96 U/L
ALT SERPL-CCNC: 13 U/L
ANION GAP SERPL CALC-SCNC: 9 MMOL/L (ref 0–18)
ANTIBODY SCREEN: NEGATIVE
AST SERPL-CCNC: 27 U/L (ref ?–34)
BASOPHILS # BLD AUTO: 0.09 X10(3) UL (ref 0–0.2)
BASOPHILS NFR BLD AUTO: 1.1 %
BILIRUB SERPL-MCNC: 0.4 MG/DL (ref 0.3–1.2)
BUN BLD-MCNC: 49 MG/DL (ref 9–23)
CALCIUM BLD-MCNC: 9.2 MG/DL (ref 8.7–10.4)
CHLORIDE SERPL-SCNC: 101 MMOL/L (ref 98–112)
CO2 SERPL-SCNC: 27 MMOL/L (ref 21–32)
CREAT BLD-MCNC: 8.63 MG/DL
EGFRCR SERPLBLD CKD-EPI 2021: 7 ML/MIN/1.73M2 (ref 60–?)
EOSINOPHIL # BLD AUTO: 0.47 X10(3) UL (ref 0–0.7)
EOSINOPHIL NFR BLD AUTO: 5.8 %
ERYTHROCYTE [DISTWIDTH] IN BLOOD BY AUTOMATED COUNT: 14.4 %
GLOBULIN PLAS-MCNC: 3.1 G/DL (ref 2–3.5)
GLUCOSE BLD-MCNC: 79 MG/DL (ref 70–99)
HCT VFR BLD AUTO: 28.9 %
HGB BLD-MCNC: 10.1 G/DL
IMM GRANULOCYTES # BLD AUTO: 0.03 X10(3) UL (ref 0–1)
IMM GRANULOCYTES NFR BLD: 0.4 %
LYMPHOCYTES # BLD AUTO: 1.2 X10(3) UL (ref 1–4)
LYMPHOCYTES NFR BLD AUTO: 14.9 %
MCH RBC QN AUTO: 32.3 PG (ref 26–34)
MCHC RBC AUTO-ENTMCNC: 34.9 G/DL (ref 31–37)
MCV RBC AUTO: 92.3 FL
MONOCYTES # BLD AUTO: 1.16 X10(3) UL (ref 0.1–1)
MONOCYTES NFR BLD AUTO: 14.4 %
NEUTROPHILS # BLD AUTO: 5.12 X10 (3) UL (ref 1.5–7.7)
NEUTROPHILS # BLD AUTO: 5.12 X10(3) UL (ref 1.5–7.7)
NEUTROPHILS NFR BLD AUTO: 63.4 %
OSMOLALITY SERPL CALC.SUM OF ELEC: 296 MOSM/KG (ref 275–295)
PLATELET # BLD AUTO: 263 10(3)UL (ref 150–450)
POTASSIUM SERPL-SCNC: 3.8 MMOL/L (ref 3.5–5.1)
PROT SERPL-MCNC: 7.6 G/DL (ref 5.7–8.2)
RBC # BLD AUTO: 3.13 X10(6)UL
RH BLOOD TYPE: POSITIVE
SODIUM SERPL-SCNC: 137 MMOL/L (ref 136–145)
WBC # BLD AUTO: 8.1 X10(3) UL (ref 4–11)

## 2024-12-17 PROCEDURE — 85025 COMPLETE CBC W/AUTO DIFF WBC: CPT | Performed by: EMERGENCY MEDICINE

## 2024-12-17 PROCEDURE — 96375 TX/PRO/DX INJ NEW DRUG ADDON: CPT

## 2024-12-17 PROCEDURE — 93010 ELECTROCARDIOGRAM REPORT: CPT

## 2024-12-17 PROCEDURE — 96361 HYDRATE IV INFUSION ADD-ON: CPT

## 2024-12-17 PROCEDURE — 99285 EMERGENCY DEPT VISIT HI MDM: CPT

## 2024-12-17 PROCEDURE — 71045 X-RAY EXAM CHEST 1 VIEW: CPT | Performed by: EMERGENCY MEDICINE

## 2024-12-17 PROCEDURE — 86901 BLOOD TYPING SEROLOGIC RH(D): CPT | Performed by: EMERGENCY MEDICINE

## 2024-12-17 PROCEDURE — 80053 COMPREHEN METABOLIC PANEL: CPT | Performed by: EMERGENCY MEDICINE

## 2024-12-17 PROCEDURE — 86850 RBC ANTIBODY SCREEN: CPT | Performed by: EMERGENCY MEDICINE

## 2024-12-17 PROCEDURE — 96374 THER/PROPH/DIAG INJ IV PUSH: CPT

## 2024-12-17 PROCEDURE — 86900 BLOOD TYPING SEROLOGIC ABO: CPT | Performed by: EMERGENCY MEDICINE

## 2024-12-17 PROCEDURE — 93005 ELECTROCARDIOGRAM TRACING: CPT

## 2024-12-17 RX ORDER — TAMSULOSIN HYDROCHLORIDE 0.4 MG/1
0.4 CAPSULE ORAL DAILY
Status: DISCONTINUED | OUTPATIENT
Start: 2024-12-17 | End: 2024-12-21

## 2024-12-17 RX ORDER — SEVELAMER CARBONATE 800 MG/1
800 TABLET, FILM COATED ORAL
Status: DISCONTINUED | OUTPATIENT
Start: 2024-12-17 | End: 2024-12-18

## 2024-12-17 RX ORDER — FENOFIBRATE 134 MG/1
134 CAPSULE ORAL NIGHTLY
Status: DISCONTINUED | OUTPATIENT
Start: 2024-12-17 | End: 2024-12-21

## 2024-12-17 RX ORDER — HYDROCORTISONE ACETATE 25 MG/1
25 SUPPOSITORY RECTAL 2 TIMES DAILY
Status: DISCONTINUED | OUTPATIENT
Start: 2024-12-17 | End: 2024-12-21

## 2024-12-17 RX ORDER — PROCHLORPERAZINE EDISYLATE 5 MG/ML
5 INJECTION INTRAMUSCULAR; INTRAVENOUS EVERY 8 HOURS PRN
Status: DISCONTINUED | OUTPATIENT
Start: 2024-12-17 | End: 2024-12-21

## 2024-12-17 RX ORDER — CARVEDILOL 12.5 MG/1
25 TABLET ORAL 2 TIMES DAILY WITH MEALS
Status: DISCONTINUED | OUTPATIENT
Start: 2024-12-17 | End: 2024-12-21

## 2024-12-17 RX ORDER — ALBUTEROL SULFATE 90 UG/1
2 INHALANT RESPIRATORY (INHALATION) EVERY 6 HOURS PRN
Status: DISCONTINUED | OUTPATIENT
Start: 2024-12-17 | End: 2024-12-21

## 2024-12-17 RX ORDER — HYDROCODONE BITARTRATE AND ACETAMINOPHEN 5; 325 MG/1; MG/1
1 TABLET ORAL EVERY 6 HOURS PRN
Status: DISCONTINUED | OUTPATIENT
Start: 2024-12-17 | End: 2024-12-21

## 2024-12-17 RX ORDER — ONDANSETRON 2 MG/ML
4 INJECTION INTRAMUSCULAR; INTRAVENOUS ONCE
Status: COMPLETED | OUTPATIENT
Start: 2024-12-17 | End: 2024-12-17

## 2024-12-17 RX ORDER — GABAPENTIN 100 MG/1
200 CAPSULE ORAL 3 TIMES DAILY
Status: DISCONTINUED | OUTPATIENT
Start: 2024-12-17 | End: 2024-12-21

## 2024-12-17 RX ORDER — POLYETHYLENE GLYCOL 3350 17 G/17G
17 POWDER, FOR SOLUTION ORAL DAILY PRN
Status: DISCONTINUED | OUTPATIENT
Start: 2024-12-17 | End: 2024-12-21

## 2024-12-17 RX ORDER — NIFEDIPINE 30 MG/1
30 TABLET, EXTENDED RELEASE ORAL DAILY
Status: DISCONTINUED | OUTPATIENT
Start: 2024-12-18 | End: 2024-12-18

## 2024-12-17 RX ORDER — HYDROMORPHONE HYDROCHLORIDE 1 MG/ML
0.5 INJECTION, SOLUTION INTRAMUSCULAR; INTRAVENOUS; SUBCUTANEOUS EVERY 30 MIN PRN
Status: DISCONTINUED | OUTPATIENT
Start: 2024-12-17 | End: 2024-12-17

## 2024-12-17 RX ORDER — BISACODYL 10 MG
10 SUPPOSITORY, RECTAL RECTAL
Status: DISCONTINUED | OUTPATIENT
Start: 2024-12-17 | End: 2024-12-21

## 2024-12-17 RX ORDER — ONDANSETRON 2 MG/ML
4 INJECTION INTRAMUSCULAR; INTRAVENOUS EVERY 6 HOURS PRN
Status: DISCONTINUED | OUTPATIENT
Start: 2024-12-17 | End: 2024-12-21

## 2024-12-17 RX ORDER — LAMOTRIGINE 25 MG/1
100 TABLET ORAL DAILY
Status: DISCONTINUED | OUTPATIENT
Start: 2024-12-17 | End: 2024-12-21

## 2024-12-17 RX ORDER — ACETAMINOPHEN 500 MG
500 TABLET ORAL EVERY 4 HOURS PRN
Status: DISCONTINUED | OUTPATIENT
Start: 2024-12-17 | End: 2024-12-21

## 2024-12-17 RX ORDER — ARIPIPRAZOLE 5 MG/1
15 TABLET ORAL DAILY
Status: DISCONTINUED | OUTPATIENT
Start: 2024-12-17 | End: 2024-12-21

## 2024-12-17 RX ORDER — DEXTROAMPHETAMINE SACCHARATE, AMPHETAMINE ASPARTATE, DEXTROAMPHETAMINE SULFATE AND AMPHETAMINE SULFATE 2.5; 2.5; 2.5; 2.5 MG/1; MG/1; MG/1; MG/1
10 TABLET ORAL
Status: DISCONTINUED | OUTPATIENT
Start: 2024-12-17 | End: 2024-12-21

## 2024-12-17 RX ORDER — SENNOSIDES 8.6 MG
17.2 TABLET ORAL NIGHTLY PRN
Status: DISCONTINUED | OUTPATIENT
Start: 2024-12-17 | End: 2024-12-21

## 2024-12-17 RX ORDER — BUPROPION HYDROCHLORIDE 150 MG/1
150 TABLET ORAL DAILY
Status: DISCONTINUED | OUTPATIENT
Start: 2024-12-17 | End: 2024-12-21

## 2024-12-17 RX ORDER — SODIUM CHLORIDE 9 MG/ML
125 INJECTION, SOLUTION INTRAVENOUS CONTINUOUS
Status: DISCONTINUED | OUTPATIENT
Start: 2024-12-17 | End: 2024-12-17

## 2024-12-17 RX ORDER — MEMANTINE HYDROCHLORIDE 5 MG/1
5 TABLET ORAL 2 TIMES DAILY
Status: DISCONTINUED | OUTPATIENT
Start: 2024-12-17 | End: 2024-12-21

## 2024-12-17 NOTE — ED QUICK NOTES
Orders for admission, patient is aware of plan and ready to go upstairs. Any questions, please call ED RN Tiara at extension [phone].     Patient Covid vaccination status: Fully vaccinated     COVID Test Ordered in ED: None    COVID Suspicion at Admission: N/A    Running Infusions:    sodium chloride 125 mL/hr (12/17/24 1151)        Mental Status/LOC at time of transport: A&OX3    Other pertinent information: Left limb alert  CIWA score: N/A   NIH score:  N/A

## 2024-12-17 NOTE — ED PROVIDER NOTES
Patient Seen in: [facility] [place]      History     Chief Complaint   Patient presents with    GI Bleeding     Stated Complaint: rectal bleeding    Subjective:   HPI      44-year-old male who has a history of having chronic renal failure on dialysis who did get dialysis yesterday comes to the hospital with having rectal bleeding since Sunday.  He has had a few per day but more today.  He has a history of having diverticula with bleeding in the past and he says it feels similar.  It is maroon and clotted.  He does feel a bit weak and dizzy.  He denies any headaches.  No chest pain is.  He is no shortness of breath.  There is no fevers or chills.  He is no significant abdominal pain.  He is no other complaints at this time.    Objective:     Past Medical History:    Anxiety state    Arrhythmia    Asthma ([facility])    Attention deficit hyperactivity disorder (ADHD)    Back problem    Bipolar 1 disorder ([facility])    CKD (chronic kidney disease) stage 3, GFR 30-59 ml/min ([facility])    Dr Meeks    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[facility])    Congestive heart disease ([facility])    COPD (chronic obstructive pulmonary disease) ([facility])    Coronary atherosclerosis    Deep vein thrombosis ([facility])    at age 17 R/T cast    Depression    Diabetes ([facility])    Dialysis patient ([facility])    Diverticulosis of large intestine    Essential hypertension    3/21 echo: severe concentric LVH with normal EF and no MR or pHTN    Extrinsic asthma, unspecified    Heart attack ([facility])    2016- angiogram- no intervention    Heart valve disease    mitral valve repair in 1994/    High blood pressure    High cholesterol    History of blood transfusion    History of mitral valve repair    Hyperlipidemia    Low back pain    tight and stiff after sweeping and mopping    LVH (left ventricular hypertrophy)    Migraines    Mixed hyperlipidemia     HDL 38 LDL 97 VLDL 57     Monoclonal gammopathy    IgG kappa     Muscle weakness    MVP (mitral valve  prolapse)    Repair  at [place]; echoes as recently as 3/21 show mild or trivial MR and no stenosis    Neuropathy    Osteoarthritis    hip ,knees    Pneumonia due to organism    Pulmonary embolism (HCC)    Renal disorder    Stroke (HCC)    TIA (transient ischemic attack)    Initial history of left-sided weakness and slurred speech. (+) cocaine. MRI of the brain, CT angiogram of the head and neck, and 2D echo are all unremarkable.     TMJ (dislocation of temporomandibular joint)    Troponin level elevated    Trop 60 60 47 with TIA and no CP: Lexiscan negative with EF 51    Visual impairment              Past Surgical History:   Procedure Laterality Date    Av fistula revision, open Left     Cabg      Colonoscopy N/A 2023    Procedure: COLONOSCOPY;  Surgeon: Heath Vu MD;  Location:  ENDOSCOPY    Colonoscopy N/A 2023    Procedure: COLONOSCOPY with cold snare polypectomy and forcep polypectomy;  Surgeon: Ousmane Suarez MD;  Location:  ENDOSCOPY    Colonoscopy & polypectomy  2019    Egd  2019    Duodenitis. Biopsied. EUS for weight loss was negative    Heart surgery      Hernia surgery  2022    Dr Barnes    Laminectomy,>2 sgmt,lumbar  2018    L4-L5 Decomp Discectomy ROEM L4-L5    Mitralplasty w cp bypass      [place]: Repair    Repair rotator cuff,chronic Left     torn and had a ruptured bicep    Sinus surgery        Spine surgery procedure unlisted      Valve repair      mitral valve                Social History     Socioeconomic History    Marital status:    Tobacco Use    Smoking status: Former     Current packs/day: 0.00     Average packs/day: 1 pack/day for 27.0 years (27.0 ttl pk-yrs)     Types: Cigarettes     Start date: 1995     Quit date: 2022     Years since quittin.8     Passive exposure: Never    Smokeless tobacco: Never   Vaping Use    Vaping status: Never Used   Substance and Sexual Activity    Alcohol use: No    Drug use: No     Social  Drivers of Health     Financial Resource Strain: Medium Risk (5/7/2024)    Received from [facility]    Overall Financial Resource Strain (CARDIA)     Difficulty of Paying Living Expenses: Somewhat hard   Food Insecurity: No Food Insecurity (12/1/2024)    Food Insecurity     Food Insecurity: Never true   Transportation Needs: No Transportation Needs (12/1/2024)    Transportation Needs     Lack of Transportation: No   Physical Activity: Inactive (5/7/2024)    Received from [facility]    Exercise Vital Sign     Days of Exercise per Week: 0 days     Minutes of Exercise per Session: 0 min   Stress: Stress Concern Present (5/7/2024)    Received from [facility]    [employer] [place] [employer] - Occupational Stress Questionnaire     Feeling of Stress : Rather much   Social Connections: Unknown (5/7/2024)    Received from [facility]    Social Connection and Isolation Panel [NHANES]     Frequency of Communication with Friends and Family: More than three times a week     Frequency of Social Gatherings with Friends and Family: More than three times a week     Attends Zoroastrian Services: Never     Active Member of Clubs or Organizations: No     Attends Club or Organization Meetings: Never     Marital Status: Patient unable to answer   Housing Stability: Low Risk  (12/1/2024)    Housing Stability     Housing Instability: No                  Physical Exam     ED Triage Vitals   BP 12/17/24 1034 (!) 156/104   Pulse 12/17/24 1033 108   Resp 12/17/24 1033 16   Temp 12/17/24 1033 98.6 °F (37 °C)   Temp src 12/17/24 1033 Temporal   SpO2 12/17/24 1033 96 %   O2 Device 12/17/24 1033 None (Room air)       Current Vitals:   Vital Signs  BP: (!) 153/103  Pulse: 103  Resp: 17  Temp: 98.1 °F (36.7 °C)  Temp src: Oral    Oxygen Therapy  SpO2: 96 %  O2 Device: None (Room air)        Physical Exam  HEENT; NCAT, EOMI, throat clear, neck supple, no LAD, no JVD  Heart S1S2 RRR  lungs: CTAB  abd:  Soft NT, ND,  NABS without rebound or guarding  Ext no C/C/E    ED Course     Labs Reviewed   COMP METABOLIC PANEL (14) - Abnormal; Notable for the following components:       Result Value    BUN 49 (*)     Creatinine 8.63 (*)     Calculated Osmolality 296 (*)     eGFR-Cr 7 (*)     All other components within normal limits   CBC WITH DIFFERENTIAL WITH PLATELET - Abnormal; Notable for the following components:    RBC 3.13 (*)     HGB 10.1 (*)     HCT 28.9 (*)     Monocyte Absolute 1.16 (*)     All other components within normal limits   TYPE AND SCREEN    Narrative:     The following orders were created for panel order Type and screen.  Procedure                               Abnormality         Status                     ---------                               -----------         ------                     ABORH (Blood Type)[[id number]]                               Final result               Antibody Screen[[id number]]                                  Final result                 Please view results for these tests on the individual orders.   ABORH (BLOOD TYPE)   ANTIBODY SCREEN   RAINBOW DRAW LAVENDER   RAINBOW DRAW LIGHT GREEN   RAINBOW DRAW BLUE     EKG    Rate, intervals and axes as noted on EKG Report.  Rate: 105  Rhythm: Sinus Rhythm  Reading: , QRS of 90, patient has sinus tachycardia without acute ischemic change.           ED Course as of 12/17/24 1249  ------------------------------------------------------------  Time: 12/17 1248  Comment: While here the patient been typed and screened.  He was given Protonix.  He had a BUN/creatinine of 49 and 8.63.  His hemoglobin was 10.1 which is consistent with prior.  He had a chest x-ray on that I person interpreted showed no acute cardiopulmonary process.  Reviewed the radiology report as well.  At this time the patient be admitted to the hospitalist with GI on consult.       XR CHEST AP PORTABLE  (CPT=71045)    Result Date: 12/17/2024  PROCEDURE:  XR CHEST AP  PORTABLE  (CPT=71045)  TECHNIQUE:  AP chest radiograph was obtained.  COMPARISON:  [facility] , XR, XR CHEST AP PORTABLE  (CPT=71045), 12/02/2024, 6:51 PM.  INDICATIONS:  rectal bleeding  PATIENT STATED HISTORY: (As transcribed by Technologist)  Patient states nausea and weakness for 2 days.    FINDINGS:  Right internal jugular split tip hemodialysis catheter is in stable position.  Confluent opacity right lung base is consistent with right effusion and atelectasis and is unchanged.  Cardiac valve prosthesis is stable.  Heart size is within normal limits.  Mediastinum and elenita are unremarkable.  Chest wall structures are unremarkable.            CONCLUSION:  1. Right dialysis catheter is stable. 2. Confluent opacity right lung base is consistent with effusion and atelectasis or pneumonia.   LOCATION:  [facility]      Dictated by (CST): Adrian Blevins MD on 12/17/2024 at 11:57 AM     Finalized by (CST): Adrian Blevins MD on 12/17/2024 at 11:58 AM             Medications   sodium chloride 0.9% infusion (125 mL/hr Intravenous New Bag 12/17/24 1151)   HYDROmorphone (Dilaudid) 1 MG/ML injection 0.5 mg (has no administration in time range)   ondansetron (Zofran) 4 MG/2ML injection 4 mg (4 mg Intravenous Given 12/17/24 1144)   pantoprazole (Protonix) 40 mg in sodium chloride 0.9% PF 10 mL IV push (40 mg Intravenous Given 12/17/24 1145)            MDM      Differential diagnosis includes upper versus lower GI bleed.  Patient here has a known history of diverticula as well as hemorrhoids and at this time he will be admitted to the hospitalist with GI consult.    Critical Care Note:  The patient arrived with a condition with significant morbidity and mortality associated. The services I provided  were to promote improvement and reduce mortality specifically involving complex record review, complex medical decisions and interventions, and consultations outside the regular procedures and care normally rendered for 45 minutes of critical  care time    This note was prepared using Dragon Medical voice recognition dictation software.  As a result errors may occur.  When identified to these areas have been corrected.  While every attempt is made to correct errors during dictation discrepancies may still exist.  Please contact if there are any errors.          Medical Decision Making      Disposition and Plan     Clinical Impression:  1. Gastrointestinal hemorrhage, unspecified gastrointestinal hemorrhage type    2. Chronic renal failure, unspecified CKD stage         Disposition:  There is no disposition on file for this visit.  There is no disposition time on file for this visit.    Follow-up:  No follow-up provider specified.        Medications Prescribed:  Current Discharge Medication List              Supplementary Documentation:

## 2024-12-18 LAB
ALBUMIN SERPL-MCNC: 3.9 G/DL (ref 3.2–4.8)
ANION GAP SERPL CALC-SCNC: 13 MMOL/L (ref 0–18)
ATRIAL RATE: 105 BPM
BASOPHILS # BLD AUTO: 0.05 X10(3) UL (ref 0–0.2)
BASOPHILS NFR BLD AUTO: 0.9 %
BUN BLD-MCNC: 51 MG/DL (ref 9–23)
CALCIUM BLD-MCNC: 8.9 MG/DL (ref 8.7–10.4)
CHLORIDE SERPL-SCNC: 103 MMOL/L (ref 98–112)
CO2 SERPL-SCNC: 24 MMOL/L (ref 21–32)
CREAT BLD-MCNC: 9.23 MG/DL
DEPRECATED HBV CORE AB SER IA-ACNC: 566 NG/ML
EGFRCR SERPLBLD CKD-EPI 2021: 7 ML/MIN/1.73M2 (ref 60–?)
EOSINOPHIL # BLD AUTO: 0.53 X10(3) UL (ref 0–0.7)
EOSINOPHIL NFR BLD AUTO: 9.6 %
ERYTHROCYTE [DISTWIDTH] IN BLOOD BY AUTOMATED COUNT: 14.4 %
GLUCOSE BLD-MCNC: 109 MG/DL (ref 70–99)
HCT VFR BLD AUTO: 27.4 %
HGB BLD-MCNC: 9.3 G/DL
IMM GRANULOCYTES # BLD AUTO: 0.02 X10(3) UL (ref 0–1)
IMM GRANULOCYTES NFR BLD: 0.4 %
IRON SATN MFR SERPL: 21 %
IRON SERPL-MCNC: 51 UG/DL
LYMPHOCYTES # BLD AUTO: 1.12 X10(3) UL (ref 1–4)
LYMPHOCYTES NFR BLD AUTO: 20.2 %
MCH RBC QN AUTO: 31.7 PG (ref 26–34)
MCHC RBC AUTO-ENTMCNC: 33.9 G/DL (ref 31–37)
MCV RBC AUTO: 93.5 FL
MONOCYTES # BLD AUTO: 0.94 X10(3) UL (ref 0.1–1)
MONOCYTES NFR BLD AUTO: 17 %
NEUTROPHILS # BLD AUTO: 2.88 X10 (3) UL (ref 1.5–7.7)
NEUTROPHILS # BLD AUTO: 2.88 X10(3) UL (ref 1.5–7.7)
NEUTROPHILS NFR BLD AUTO: 51.9 %
OSMOLALITY SERPL CALC.SUM OF ELEC: 304 MOSM/KG (ref 275–295)
P AXIS: 28 DEGREES
P-R INTERVAL: 182 MS
PHOSPHATE SERPL-MCNC: 6.4 MG/DL (ref 2.4–5.1)
PLATELET # BLD AUTO: 223 10(3)UL (ref 150–450)
POTASSIUM SERPL-SCNC: 3.8 MMOL/L (ref 3.5–5.1)
Q-T INTERVAL: 380 MS
QRS DURATION: 90 MS
QTC CALCULATION (BEZET): 502 MS
R AXIS: 20 DEGREES
RBC # BLD AUTO: 2.93 X10(6)UL
SODIUM SERPL-SCNC: 140 MMOL/L (ref 136–145)
T AXIS: 83 DEGREES
TOTAL IRON BINDING CAPACITY: 248 UG/DL (ref 250–425)
TRANSFERRIN SERPL-MCNC: 190 MG/DL (ref 215–365)
VENTRICULAR RATE: 105 BPM
WBC # BLD AUTO: 5.5 X10(3) UL (ref 4–11)

## 2024-12-18 PROCEDURE — 83540 ASSAY OF IRON: CPT | Performed by: INTERNAL MEDICINE

## 2024-12-18 PROCEDURE — 80069 RENAL FUNCTION PANEL: CPT | Performed by: STUDENT IN AN ORGANIZED HEALTH CARE EDUCATION/TRAINING PROGRAM

## 2024-12-18 PROCEDURE — 85025 COMPLETE CBC W/AUTO DIFF WBC: CPT | Performed by: INTERNAL MEDICINE

## 2024-12-18 PROCEDURE — 90935 HEMODIALYSIS ONE EVALUATION: CPT | Performed by: INTERNAL MEDICINE

## 2024-12-18 PROCEDURE — 82728 ASSAY OF FERRITIN: CPT | Performed by: INTERNAL MEDICINE

## 2024-12-18 PROCEDURE — 83550 IRON BINDING TEST: CPT | Performed by: INTERNAL MEDICINE

## 2024-12-18 RX ORDER — LOSARTAN POTASSIUM 100 MG/1
100 TABLET ORAL DAILY
Status: DISCONTINUED | OUTPATIENT
Start: 2024-12-18 | End: 2024-12-21

## 2024-12-18 RX ORDER — NIFEDIPINE 30 MG/1
60 TABLET, EXTENDED RELEASE ORAL 2 TIMES DAILY
Status: DISCONTINUED | OUTPATIENT
Start: 2024-12-18 | End: 2024-12-21

## 2024-12-18 RX ORDER — HEPARIN SODIUM 1000 [USP'U]/ML
1.5 INJECTION, SOLUTION INTRAVENOUS; SUBCUTANEOUS
Status: DISPENSED | OUTPATIENT
Start: 2024-12-18 | End: 2024-12-20

## 2024-12-18 RX ORDER — SEVELAMER CARBONATE 800 MG/1
2400 TABLET, FILM COATED ORAL
Status: DISCONTINUED | OUTPATIENT
Start: 2024-12-18 | End: 2024-12-21

## 2024-12-18 RX ORDER — HYDRALAZINE HYDROCHLORIDE 50 MG/1
50 TABLET, FILM COATED ORAL EVERY 8 HOURS SCHEDULED
Status: DISCONTINUED | OUTPATIENT
Start: 2024-12-18 | End: 2024-12-19

## 2024-12-18 RX ORDER — ALBUMIN (HUMAN) 12.5 G/50ML
25 SOLUTION INTRAVENOUS
Status: ACTIVE | OUTPATIENT
Start: 2024-12-18 | End: 2024-12-20

## 2024-12-18 NOTE — H&P
[facility] Hospitalist H&P       CC:   Chief Complaint   Patient presents with    GI Bleeding        PCP: Adrian Small MD    History of Present Illness: 44 year old male with PMH sig for TIA, ESRD on HD, HFpEF, HTN, T2DM, CAD, COPD, PE who presents for GI bleed.  has been happeing for 3-4 days wehre he sees BRBPR in toilet bowl. He denies any loc or dizziness. He also endorses pain in his abdomen.  takes norco but ran out 1 week ago.  Of note, patient has history of frequent readmissions due to similar problems.  Has had an extensive GI workup in the past.  He is compliant with his suppository for hemorrhoids.  Denies any chest pain shortness of breath nausea vomiting fever chills at this time.  States that he is due for dialysis tomorrow.  In the ER, patient was hemodynamically stable.  Hemoglobin at baseline.  He is admitted for further workup and management.             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[facility])    Congestive heart disease (HCC)    COPD (chronic obstructive pulmonary disease) ([facility])    Coronary atherosclerosis    Deep vein thrombosis ([facility])    at age 17 R/T cast    Depression    Diabetes ([facility])    Dialysis patient ([facility])    Diverticulosis of large intestine    Essential hypertension    3/21 echo: severe concentric LVH with normal EF and no MR or pHTN    Extrinsic asthma, unspecified    Heart attack (HCC)    2016- angiogram- no intervention    Heart valve disease    mitral valve repair in 1994/    High blood pressure    High cholesterol    History of blood transfusion    History of mitral valve repair    Hyperlipidemia    Low back pain    tight and stiff after sweeping and mopping    LVH (left ventricular hypertrophy)    Migraines    Mixed hyperlipidemia      HDL 38 LDL 97 VLDL 57     Monoclonal gammopathy    IgG kappa     Muscle weakness    MVP (mitral valve prolapse)    Repair  at [place]; echoes as recently as 3/21 show mild or trivial MR and no stenosis    Neuropathy    Osteoarthritis    hip ,knees    Pneumonia due to organism    Pulmonary embolism (HCC)    Renal disorder    Stroke (HCC)    TIA (transient ischemic attack)    Initial history of left-sided weakness and slurred speech. (+) cocaine. MRI of the brain, CT angiogram of the head and neck, and 2D echo are all unremarkable.     TMJ (dislocation of temporomandibular joint)    Troponin level elevated    Trop 60 60 47 with TIA and no CP: Lexiscan negative with EF 51    Visual impairment        PSH  Past Surgical History:   Procedure Laterality Date    Av fistula revision, open Left     Cabg      Colonoscopy N/A 2023    Procedure: COLONOSCOPY;  Surgeon: Heath Vu MD;  Location:  ENDOSCOPY    Colonoscopy N/A 2023    Procedure: COLONOSCOPY with cold snare polypectomy and forcep polypectomy;  Surgeon: Ousmane Suarez MD;  Location:  ENDOSCOPY    Colonoscopy & polypectomy  2019    Egd  2019    Duodenitis. Biopsied. EUS for weight loss was negative    Heart surgery      Hernia surgery  2022    Dr Barnes    Laminectomy,>2 sgmt,lumbar  2018    L4-L5 Decomp Discectomy ROEM L4-L5    Mitralplasty w cp bypass      [place]: Repair    Repair rotator cuff,chronic Left     torn and had a ruptured bicep    Sinus surgery        Spine surgery procedure unlisted      Valve repair      mitral valve        ALL:  Allergies[1]     Home Medications:  Medications Taking[2]      Soc Hx  Social History     Tobacco Use    Smoking status: Former     Current packs/day: 0.00     Average packs/day: 1 pack/day for 27.0 years (27.0 ttl pk-yrs)     Types: Cigarettes     Start date: 1995     Quit date: 2022     Years since quittin.8     Passive exposure: Never    Smokeless  tobacco: Never   Substance Use Topics    Alcohol use: No        Fam Hx  Family History   Problem Relation Age of Onset    Hypertension Father     Alcohol and Other Disorders Associated Father     Substance Abuse Father         cocaine    Dementia Father     Cancer Father         lung    Diabetes Mother     Cancer Mother         multiple myeloma    Hypertension Mother     Anxiety Maternal Aunt     Depression Maternal Aunt     Anxiety Maternal Aunt     Depression Maternal Aunt     Bipolar Disorder Maternal Aunt     Diabetes Maternal Grandmother     Hypertension Maternal Grandmother     Cancer Maternal Grandfather         stomach cancer    Diabetes Maternal Grandfather     Hypertension Maternal Grandfather     Alcohol and Other Disorders Associated Maternal Grandfather     Hypertension Paternal Grandmother     Hypertension Paternal Grandfather     Cancer Sister         uterine and ovarian    Hypertension Sister     Cancer Maternal Uncle         lung    Cancer Paternal Aunt         throat       Review of Systems  Comprehensive ROS reviewed and negative except for what's stated above.     OBJECTIVE:  BP (!) 159/106 (BP Location: Right arm)   Pulse 96   Temp 97.8 °F (36.6 °C) (Oral)   Resp 16   Ht 6' 2\" (1.88 m)   Wt 240 lb (108.9 kg)   SpO2 98%   BMI 30.81 kg/m²   General:  Alert, no distress, chronically ill   Head:  Normocephalic, without obvious abnormality, atraumatic.   Eyes:  Sclera anicteric, No conjunctival pallor, EOMs intact.    Nose: Nares normal. Septum midline. Mucosa normal. No drainage.   Throat: Lips, mucosa, and tongue normal. Teeth and gums normal.   Neck: Supple, symmetrical, trachea midline,   Lungs:   Clear to auscultation bilaterally. Normal effort   Chest wall:  No tenderness or deformity.   Heart:  Regular rate and rhythm, S1, S2   Abdomen:   Soft, non-tender. Bowel sounds normal.  Non distended   Extremities: Extremities normal, atraumatic, no cyanosis or edema.   Skin: Skin color,  texture, turgor normal. No rashes or lesions.    Neurologic: Normal strength, no focal deficit appreciated     Diagnostic Data:    CBC/Chem  Recent Labs   Lab 12/17/24  1143   WBC 8.1   HGB 10.1*   MCV 92.3   .0       Recent Labs   Lab 12/17/24  1143      K 3.8      CO2 27.0   BUN 49*   CREATSERUM 8.63*   GLU 79   CA 9.2       Recent Labs   Lab 12/17/24  1143   ALT 13   AST 27   ALB 4.5       No results for input(s): \"TROP\" in the last 168 hours.    CXR: image personally reviewed     Radiology: XR CHEST AP PORTABLE  (CPT=71045)    Result Date: 12/17/2024  CONCLUSION:  1. Right dialysis catheter is stable. 2. Confluent opacity right lung base is consistent with effusion and atelectasis or pneumonia.   LOCATION:  [facility]      Dictated by (CST): Adrian Blevins MD on 12/17/2024 at 11:57 AM     Finalized by (CST): Adrian Blevins MD on 12/17/2024 at 11:58 AM          ASSESSMENT / PLAN:       44 year old male with PMH sig for TIA, ESRD on HD, HFpEF, HTN, T2DM, CAD, COPD, PE who presents for GI bleed. States has been happeing for 3-4 days wehre he sees BRBPR in toilet bowl.     Bright red blood per rectum  - Received IV fluids, MI patient is a dialysis patient  - Currently no further bleeding since admission  - Hemoglobin has been stable, check hemoglobin in the a.m.  - Recent EGD and colonoscopy reportedly normal in August 2024, normal capsule study in October 2024  - Suspect etiology to be secondary to diverticular bleed versus hemorrhoidal bleed  - GI on consult, recommendations appreciated    ESRD on hemodialysis  - Monday Wednesday Friday dialysis  - Renal on consult, appreciate recommendations    #cHTN  -Continue home Coreg, losartan, hydralazine, nifedipine.  IV labetalol as needed.     # Cervical radiculopathy  -Pain control as needed  -Follow-up with spine surgeon- knows that norco will not be prescribed at [facility]. Pt should follow up with his primary doc.     # Bipolar disorder  -Continue Abilify,  bupropion, Lamictal     # Depression  -Continue fluoxetine     #COPD, chronic  -Continue home inhalers    #chronic diastolic HF      FN:  - IVF: dc ivf  - Diet:renal    DVT Prophy: scd, heparin subcu  Atrophy: ambulate as mary  Lines:piv    Dispo: pending clinical course    Outpatient records or previous hospital records reviewed.     Further recommendations pending patient's clinical course.  [facility] hospitalist to continue to follow patient while in house    Patient and/or patient's family given opportunity to ask questions and note understanding and agreeing with therapeutic plan as outlined    Thank You,  DO Kevin Pritchard Hospitalist  Answering Service number: [phone]         [1]   Allergies  Allergen Reactions    Hydrochlorothiazide RASH and HIVES   [2]   Outpatient Medications Marked as Taking for the 12/17/24 encounter (Hospital Encounter)   Medication Sig Dispense Refill    gabapentin 100 MG Oral Cap Take 2 capsules (200 mg total) by mouth 3 (three) times daily. 180 capsule 0    NIFEdipine ER 30 MG Oral Tablet 24 Hr Take 1 tablet (30 mg total) by mouth daily. Hold if systolic blood pressure <130 30 tablet 0    hydrocortisone 25 MG Rectal Suppos Place 1 suppository (25 mg total) rectally 2 (two) times daily. 60 suppository 0    VYVANSE 60 MG Oral Cap Take 1 capsule (60 mg total) by mouth every morning.      lamoTRIgine 100 MG Oral Tab Take 1 tablet (100 mg total) by mouth daily.      memantine 5 MG Oral Tab Take 1 tablet (5 mg total) by mouth 2 (two) times daily.      albuterol 108 (90 Base) MCG/ACT Inhalation Aero Soln Inhale 2 puffs into the lungs every 6 (six) hours as needed for Wheezing.      tamsulosin 0.4 MG Oral Cap Take 1 capsule (0.4 mg total) by mouth daily.      ARIPiprazole 15 MG Oral Tab Take 1 tablet (15 mg total) by mouth daily.      buPROPion  MG Oral Tablet 24 Hr Take 1 tablet (150 mg total) by mouth daily.      FLUoxetine HCl 40 MG Oral Cap Take 1 capsule (40 mg total) by  mouth daily. 7 capsule 0    RENVELA 800 MG Oral Tab Take 1 tablet (800 mg total) by mouth 3 (three) times daily with meals.      carvedilol 25 MG Oral Tab Take 1 tablet (25 mg total) by mouth in the morning and 1 tablet (25 mg total) in the evening. Take with meals. 60 tablet 6    Fenofibrate 134 MG Oral Cap Take 1 capsule by mouth nightly. 90 capsule 1    Fluticasone Propionate 50 MCG/ACT Nasal Suspension SPRAY ONCE INTO EACH NOSTRIL BID PRN 15.8 mL 0

## 2024-12-18 NOTE — CONSULTS
[facility]   part of [facility]    Report of Consultation    Godwin Fonseca Patient Status:  Observation    1980 MRN [id number]   Location [facility] 4NW-A Attending Minna Costello, DO   Hosp Day # 0 PCP Adrian Small MD       REASON FOR CONSULT:     ESRD    HISTORY OF PRESENT ILLNESS:       43 yo M with history of ESRD on iHD MWF with LUE AVF and RIJ tunnelled HD catheter, HTN, severe MR s/p MVR x2, HFpEF, DVT/PE, TIA, MGUS, bipolar disorder, recurrent GI bleed presented with rectal bleeding x 3-4 days. States when he presented, the bleeding was becoming more severe over time which lead him to be evaluated. At the time of my visit, he states the bleeding is slowing down. He underwent HD 2 days ago. States he has his CVC in place because there were some clots in the AVF. He still makes urine. He denies leg swelling, cp, sob.    REVIEW OF SYSTEMS:     Please see HPI for pertinent positives. 10 point review of systems otherwise reviewed and negative.     HISTORY:     Past Medical History:    Anxiety state    Arrhythmia    Asthma (HCC)    Attention deficit hyperactivity disorder (ADHD)    Back problem    Bipolar 1 disorder (HCC)    CKD (chronic kidney disease) stage 3, GFR 30-59 ml/min (HCC)    Dr Meeks    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HCC)    Congestive heart disease (HCC)    COPD (chronic obstructive pulmonary disease) (HCC)    Coronary atherosclerosis    Deep vein thrombosis (HCC)    at age 17 R/T cast    Depression    Diabetes (HCC)    Dialysis patient (HCC)    Diverticulosis of large intestine    Essential hypertension    3/21 echo: severe concentric LVH with normal EF and no MR or pHTN    Extrinsic asthma, unspecified    Heart attack (HCC)    - angiogram- no intervention    Heart valve disease    mitral valve repair in /    High blood pressure    High cholesterol    History of blood transfusion    History of mitral valve repair    Hyperlipidemia    Low back pain    tight and stiff  after sweeping and mopping    LVH (left ventricular hypertrophy)    Migraines    Mixed hyperlipidemia     HDL 38 LDL 97 VLDL 57     Monoclonal gammopathy    IgG kappa     Muscle weakness    MVP (mitral valve prolapse)    Repair 1994 at [facility] [place]; echoes as recently as 3/21 show mild or trivial MR and no stenosis    Neuropathy    Osteoarthritis    hip ,knees    Pneumonia due to organism    Pulmonary embolism (HCC)    Renal disorder    Stroke (HCC)    TIA (transient ischemic attack)    Initial history of left-sided weakness and slurred speech. (+) cocaine. MRI of the brain, CT angiogram of the head and neck, and 2D echo are all unremarkable.     TMJ (dislocation of temporomandibular joint)    Troponin level elevated    Trop 60 60 47 with TIA and no CP: Lexiscan negative with EF 51    Visual impairment     Past Surgical History:   Procedure Laterality Date    Av fistula revision, open Left     Cabg      Colonoscopy N/A 03/26/2023    Procedure: COLONOSCOPY;  Surgeon: Heath Vu MD;  Location:  ENDOSCOPY    Colonoscopy N/A 12/30/2023    Procedure: COLONOSCOPY with cold snare polypectomy and forcep polypectomy;  Surgeon: Ousmane Suarez MD;  Location:  ENDOSCOPY    Colonoscopy & polypectomy  2019    Egd  2019    Duodenitis. Biopsied. EUS for weight loss was negative    Heart surgery      Hernia surgery  08/17/2022    Dr Barnes    Laminectomy,>2 sgmt,lumbar  09/06/2018    L4-L5 Decomp Discectomy ROEM L4-L5    Mitralplasty w cp bypass  1994    [facility]: Repair    Repair rotator cuff,chronic Left     torn and had a ruptured bicep    Sinus surgery        Spine surgery procedure unlisted      Valve repair  1994    mitral valve     Family History   Problem Relation Age of Onset    Hypertension Father     Alcohol and Other Disorders Associated Father     Substance Abuse Father         cocaine    Dementia Father     Cancer Father         lung    Diabetes Mother     Cancer Mother         multiple myeloma     Hypertension Mother     Anxiety Maternal Aunt     Depression Maternal Aunt     Anxiety Maternal Aunt     Depression Maternal Aunt     Bipolar Disorder Maternal Aunt     Diabetes Maternal Grandmother     Hypertension Maternal Grandmother     Cancer Maternal Grandfather         stomach cancer    Diabetes Maternal Grandfather     Hypertension Maternal Grandfather     Alcohol and Other Disorders Associated Maternal Grandfather     Hypertension Paternal Grandmother     Hypertension Paternal Grandfather     Cancer Sister         uterine and ovarian    Hypertension Sister     Cancer Maternal Uncle         lung    Cancer Paternal Aunt         throat      reports that he quit smoking about 2 years ago. His smoking use included cigarettes. He started smoking about 29 years ago. He has a 27 pack-year smoking history. He has never been exposed to tobacco smoke. He has never used smokeless tobacco. He reports that he does not drink alcohol and does not use drugs.    ALLERGIES:     Allergies[1]    MEDICATIONS:       Current Facility-Administered Medications:     sevelamer carbonate (Renvela) tab 2,400 mg, 2,400 mg, Oral, TID CC    hydrALAZINE (Apresoline) tab 50 mg, 50 mg, Oral, Q8H MARTHA    NIFEdipine ER (Procardia-XL) 24 hr tab 60 mg, 60 mg, Oral, BID    losartan (Cozaar) tab 100 mg, 100 mg, Oral, Daily    sodium chloride 0.9 % IV bolus 100 mL, 100 mL, Intravenous, Q30 Min PRN **AND** albumin human (Albumin) 25% injection 25 g, 25 g, Intravenous, PRN Dialysis    hydrocortisone (Anusol-HC) 25 MG rectal suppository 25 mg, 25 mg, Rectal, BID    acetaminophen (Tylenol Extra Strength) tab 500 mg, 500 mg, Oral, Q4H PRN    polyethylene glycol (PEG 3350) (Miralax) 17 g oral packet 17 g, 17 g, Oral, Daily PRN    sennosides (Senokot) tab 17.2 mg, 17.2 mg, Oral, Nightly PRN    bisacodyl (Dulcolax) 10 MG rectal suppository 10 mg, 10 mg, Rectal, Daily PRN    melatonin tab 3 mg, 3 mg, Oral, Nightly PRN    ondansetron (Zofran) 4 MG/2ML  injection 4 mg, 4 mg, Intravenous, Q6H PRN    prochlorperazine (Compazine) 10 MG/2ML injection 5 mg, 5 mg, Intravenous, Q8H PRN    albuterol (Ventolin HFA) 108 (90 Base) MCG/ACT inhaler 2 puff, 2 puff, Inhalation, Q6H PRN    ARIPiprazole (Abilify) tab 15 mg, 15 mg, Oral, Daily    buPROPion ER (Wellbutrin XL) 24 hr tab 150 mg, 150 mg, Oral, Daily    carvedilol (Coreg) tab 25 mg, 25 mg, Oral, BID with meals    fenofibrate micronized (Lofibra) cap 134 mg, 134 mg, Oral, Nightly    FLUoxetine (PROzac) cap 40 mg, 40 mg, Oral, Daily    gabapentin (Neurontin) cap 200 mg, 200 mg, Oral, TID    lamoTRIgine (LaMICtal) tab 100 mg, 100 mg, Oral, Daily    memantine (Namenda) tab 5 mg, 5 mg, Oral, BID    tamsulosin (Flomax) cap 0.4 mg, 0.4 mg, Oral, Daily    amphetamine-dextroamphetamine (Adderall) tab 10 mg, 10 mg, Oral, BID AC    HYDROcodone-acetaminophen (Norco) 5-325 MG per tab 1 tablet, 1 tablet, Oral, Q6H PRN  No current outpatient medications on file.         PHYSICAL EXAM:     Vital Signs: BP (!) 153/106 (BP Location: Right arm)   Pulse 93   Temp 98.2 °F (36.8 °C) (Oral)   Resp 18   Ht 188 cm (6' 2\")   Wt 240 lb (108.9 kg)   SpO2 98%   BMI 30.81 kg/m²   Temp (24hrs), Av °F (36.7 °C), Min:97.5 °F (36.4 °C), Max:98.5 °F (36.9 °C)     No intake or output data in the 24 hours ending 24 1141  Wt Readings from Last 3 Encounters:   24 240 lb (108.9 kg)   24 236 lb 6.4 oz (107.2 kg)   24 236 lb 12.8 oz (107.4 kg)       General: pleasant, well appearing  Skin: no visible rashes  HEENT: NCAT  Cardiac: Regular rate and rhythm, 3/6 systolic murmur  Lungs: CTAB, no wheeze, no rale, no rhonchi  Abdomen: Soft, NTND  Extremities: warm, well perfused, no leg edema  Neurologic/Psych: mentating well, no asterixis  RIJ tunnelled HD catheter  LUE AVF + thrill and bruit    LABORATORY DATA:       Lab Results   Component Value Date     (H) 2024    BUN 51 (H) 2024    BUNCREA 13.0 2022     CREATSERUM 9.23 (H) 12/18/2024    ANIONGAP 13 12/18/2024    GFR 59 (L) 01/04/2018    GFRNAA 30 (L) 07/12/2022    GFRAA 35 (L) 07/12/2022    CA 8.9 12/18/2024    OSMOCALC 304 (H) 12/18/2024    ALKPHO 96 12/17/2024    AST 27 12/17/2024    ALT 13 12/17/2024    BILT 0.4 12/17/2024    TP 7.6 12/17/2024    ALB 3.9 12/18/2024    GLOBULIN 3.1 12/17/2024     12/18/2024    K 3.8 12/18/2024     12/18/2024    CO2 24.0 12/18/2024     Lab Results   Component Value Date    WBC 5.5 12/18/2024    RBC 2.93 (L) 12/18/2024    HGB 9.3 (L) 12/18/2024    HCT 27.4 (L) 12/18/2024    .0 12/18/2024    MPV 11.5 12/14/2012    MCV 93.5 12/18/2024    MCH 31.7 12/18/2024    MCHC 33.9 12/18/2024    RDW 14.4 12/18/2024    NEPRELIM 2.88 12/18/2024    NEPERCENT 51.9 12/18/2024    LYPERCENT 20.2 12/18/2024    MOPERCENT 17.0 12/18/2024    EOPERCENT 9.6 12/18/2024    BAPERCENT 0.9 12/18/2024    NE 2.88 12/18/2024    LYMABS 1.12 12/18/2024    MOABSO 0.94 12/18/2024    EOABSO 0.53 12/18/2024    BAABSO 0.05 12/18/2024     Lab Results   Component Value Date    MALBP 167.00 05/24/2023    CREUR 142.00 05/24/2023    CREUR 142.00 05/24/2023     Lab Results   Component Value Date    COLORUR Light-Yellow 11/17/2024    CLARITY Clear 11/17/2024    SPECGRAVITY 1.013 11/17/2024    GLUUR Normal 11/17/2024    BILUR Negative 11/17/2024    KETUR Negative 11/17/2024    BLOODURINE Negative 11/17/2024    PHURINE 8.5 (H) 11/17/2024    PROUR 100 (A) 11/17/2024    UROBILINOGEN Normal 11/17/2024    NITRITE Negative 11/17/2024    LEUUR Negative 11/17/2024    WBCUR 1-5 11/17/2024    RBCUR 0-2 11/17/2024    EPIUR Few (A) 11/17/2024    BACUR Rare (A) 11/17/2024    CAOXUR Occasional (A) 04/20/2018    HYLUR Present (A) 05/14/2019         IMAGING:     Reviewed.      ASSESSMENT/PLAN:     45 yo M with history of ESRD on iHD MWF with LUE AVF and RIJ tunnelled HD catheter, HTN, severe MR s/p MVR x2, HFpEF, DVT/PE, TIA, MGUS, bipolar disorder, recurrent GI bleed presented  with rectal bleeding x 3-4 days.     ESRD:  -- HD today 2-3L UF    Anemia in ESRD:  -- ferrlicit x 1 for borderline low tsat  -- defer OWEN in the setting of uncontrolled HTN    Hyperphosphatemia:  -- resume sevelamer 2400 TID with meals  -- low phos diet    HTN:  -- resume antihypertensives as he was previously taking    LUE AVF:  -- L arm precautions    Rectal bleeding:  -- per GI        Thank you for allowing me to participate in the care of your patient. Please do not hesitate to contact me with concerns or questions.    Lenka Bond MD  [facility]  [address] [place] [id number]    12/18/2024  11:41 AM         [1]   Allergies  Allergen Reactions    Hydrochlorothiazide RASH and HIVES

## 2024-12-18 NOTE — CONSULTS
[facility] IP Report of Consultation Gastroenterology    12/18/2024    Godwin Fonseca  male   Chung Alfaro MD     [id number]  4/12/1980 Primary Care Physician  Adrian Small MD     Reason for Consultation: rectal bleeding    HPI    45 yo M with CAD, CHF, COPD, DM2, ESRD on HD, MGUS here for rectal bleeding. Seen by me in August for same in hospital. Admitted to outside hospital earlier August for same. EGD and colonoscopy reportedly normal. Outpatient capsule endoscopy normal.    Had episode of painless brbpr Sunday and Monday. No BM since.       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 (HCC)    Chronic non-seasonal allergic rhinitis    Chronic sinusitis, unspecified location    ADHD (attention deficit hyperactivity disorder), inattentive type    Bipolar depression (HCC)    Essential hypertension    Mild persistent asthma without complication (HCC)    CKD (chronic kidney disease) stage 3, GFR 30-59 ml/min (HCC)    Self-inflicted injury    Bipolar disorder in partial remission, most recent episode unspecified type (HCC)    Family history of prostate cancer    Fatigue, unspecified type    Alkaline phosphatase elevation    Hyperlipidemia, mixed    Low serum HDL    Spinal stenosis of lumbar region with neurogenic claudication    Prolapsed lumbar disc    Status post lumbar surgery    Cauda equina syndrome (HCC)    Lumbar disc prolapse with compression radiculopathy    Syncope and collapse    Hypokalemia    Orthostatic hypotension    Hypertension, unspecified type    Weight loss    Chest pain, unspecified type    History of mitral valve stenosis    Acute pericarditis, unspecified type (HCC)    Cervical radiculopathy    Elevated blood protein    IgG monoclonal protein disorder    MGUS (monoclonal gammopathy of unknown significance)    Hypertensive urgency    Bipolar 2 disorder (HCC)    Employment problem    Stress    Anxiety disorder,  unspecified type    Weakness of left lower extremity    Rectal bleeding    Paresthesia of foot, bilateral    Obesity (BMI 30-39.9)    TIA (transient ischemic attack)    TMJ dysfunction    Inverted papilloma of nasal cavity    Type 2 diabetes mellitus with stage 3b chronic kidney disease, without long-term current use of insulin (HCC)    Acute kidney injury (HCC)    Metabolic acidosis    Hyperglycemia    Chronic kidney disease, unspecified CKD stage    Abdominal distension    SOB (shortness of breath)    Hypertensive crisis    Acute on chronic congestive heart failure, unspecified heart failure type (HCC)    Acute congestive heart failure (HCC)    Acute congestive heart failure, unspecified heart failure type (HCC)    Acute on chronic diastolic congestive heart failure (HCC)    Stage 4 chronic kidney disease ([facility])    ROBERT (acute kidney injury) ([facility])    Abdominal pain, left lower quadrant    Hypertensive emergency    Acute chest pain    Acute on chronic renal insufficiency    Chronic abdominal pain    Nonintractable headache, unspecified chronicity pattern, unspecified headache type    Chronic right shoulder pain    HCAP (healthcare-associated pneumonia)    Acute intractable headache, unspecified headache type    ESRD (end stage renal disease) on dialysis ([facility])    Diarrhea, unspecified type    Primary hypertension    Dyspnea, unspecified type    Abdominal pain, acute    ESRD on dialysis ([facility])    Chronic renal failure, unspecified CKD stage    Fluid overload    ESRD needing dialysis ([facility])    COVID-19 virus infection    Hypotension, unspecified hypotension type    Anemia    Acute renal failure (ARF) ([facility])    Neck pain    Acute nonintractable headache, unspecified headache type    GI bleed    Gastrointestinal hemorrhage, unspecified gastrointestinal hemorrhage type    Chronic kidney disease with end stage renal failure on dialysis (HCC)    Lower abdominal pain    ESRD (end stage renal disease) ([facility])    Resistant  hypertension    Community acquired pneumonia of right lower lobe of lung    Acute renal failure with acute renal cortical necrosis superimposed on stage 4 chronic kidney disease (HCC)    Acute renal failure, unspecified acute renal failure type ([facility])    Hypervolemia, unspecified hypervolemia type    End stage renal disease on dialysis ([facility])    Acute respiratory failure with hypoxia ([facility])    Stage 5 chronic kidney disease on chronic dialysis ([facility])    Anemia, unspecified type    Hyperkalemia    Hemodialysis catheter infection, initial encounter ([facility])    Type 2 diabetes mellitus with chronic kidney disease on chronic dialysis, without long-term current use of insulin ([facility])    Coronary artery disease involving native coronary artery of native heart without angina pectoris    Pneumonia of right lower lobe due to infectious organism    Expressive aphasia    Uncontrolled hypertension    Bipolar disorder with moderate depression ([facility])    BRBPR (bright red blood per rectum)    Weakness    Nosebleed       PATIENT HISTORY:     Past Medical History:    Anxiety state    Arrhythmia    Asthma ([facility])    Attention deficit hyperactivity disorder (ADHD)    Back problem    Bipolar 1 disorder ([facility])    CKD (chronic kidney disease) stage 3, GFR 30-59 ml/min ([facility])    Dr Meeks    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[facility])    Congestive heart disease ([facility])    COPD (chronic obstructive pulmonary disease) ([facility])    Coronary atherosclerosis    Deep vein thrombosis ([facility])    at age 17 R/T cast    Depression    Diabetes ([facility])    Dialysis patient ([facility])    Diverticulosis of large intestine    Essential hypertension    3/21 echo: severe concentric LVH with normal EF and no MR or pHTN    Extrinsic asthma, unspecified    Heart attack ([facility])    2016- angiogram- no intervention    Heart valve disease    mitral valve repair in 1994/    High blood pressure    High cholesterol    History of blood transfusion    History of mitral valve repair    Hyperlipidemia    Low  back pain    tight and stiff after sweeping and mopping    LVH (left ventricular hypertrophy)    Migraines    Mixed hyperlipidemia     HDL 38 LDL 97 VLDL 57     Monoclonal gammopathy    IgG kappa     Muscle weakness    MVP (mitral valve prolapse)    Repair 1994 at [place]; echoes as recently as 3/21 show mild or trivial MR and no stenosis    Neuropathy    Osteoarthritis    hip ,knees    Pneumonia due to organism    Pulmonary embolism (HCC)    Renal disorder    Stroke (HCC)    TIA (transient ischemic attack)    Initial history of left-sided weakness and slurred speech. (+) cocaine. MRI of the brain, CT angiogram of the head and neck, and 2D echo are all unremarkable.     TMJ (dislocation of temporomandibular joint)    Troponin level elevated    Trop 60 60 47 with TIA and no CP: Lexiscan negative with EF 51    Visual impairment      Past Surgical History:   Procedure Laterality Date    Av fistula revision, open Left     Cabg      Colonoscopy N/A 03/26/2023    Procedure: COLONOSCOPY;  Surgeon: Heath Vu MD;  Location:  ENDOSCOPY    Colonoscopy N/A 12/30/2023    Procedure: COLONOSCOPY with cold snare polypectomy and forcep polypectomy;  Surgeon: Ousmane Suarez MD;  Location:  ENDOSCOPY    Colonoscopy & polypectomy  2019    Egd  2019    Duodenitis. Biopsied. EUS for weight loss was negative    Heart surgery      Hernia surgery  08/17/2022    Dr Barnes    Laminectomy,>2 sgmt,lumbar  09/06/2018    L4-L5 Decomp Discectomy ROEM L4-L5    Mitralplasty w cp bypass  1994    [place]: Repair    Repair rotator cuff,chronic Left     torn and had a ruptured bicep    Sinus surgery        Spine surgery procedure unlisted      Valve repair  1994    mitral valve      Family History   Problem Relation Age of Onset    Hypertension Father     Alcohol and Other Disorders Associated Father     Substance Abuse Father         cocaine    Dementia Father     Cancer Father         lung    Diabetes Mother     Cancer Mother          multiple myeloma    Hypertension Mother     Anxiety Maternal Aunt     Depression Maternal Aunt     Anxiety Maternal Aunt     Depression Maternal Aunt     Bipolar Disorder Maternal Aunt     Diabetes Maternal Grandmother     Hypertension Maternal Grandmother     Cancer Maternal Grandfather         stomach cancer    Diabetes Maternal Grandfather     Hypertension Maternal Grandfather     Alcohol and Other Disorders Associated Maternal Grandfather     Hypertension Paternal Grandmother     Hypertension Paternal Grandfather     Cancer Sister         uterine and ovarian    Hypertension Sister     Cancer Maternal Uncle         lung    Cancer Paternal Aunt         throat      Social History     Socioeconomic History    Marital status:    Tobacco Use    Smoking status: Former     Current packs/day: 0.00     Average packs/day: 1 pack/day for 27.0 years (27.0 ttl pk-yrs)     Types: Cigarettes     Start date: 1995     Quit date: 2022     Years since quittin.8     Passive exposure: Never    Smokeless tobacco: Never   Vaping Use    Vaping status: Never Used   Substance and Sexual Activity    Alcohol use: No    Drug use: No     Social Drivers of Health     Financial Resource Strain: Medium Risk (2024)    Received from [facility]    Overall Financial Resource Strain (CARDIA)     Difficulty of Paying Living Expenses: Somewhat hard   Food Insecurity: No Food Insecurity (2024)    Food Insecurity     Food Insecurity: Never true   Transportation Needs: No Transportation Needs (2024)    Transportation Needs     Lack of Transportation: No   Physical Activity: Inactive (2024)    Received from [facility]    Exercise Vital Sign     Days of Exercise per Week: 0 days     Minutes of Exercise per Session: 0 min   Stress: Stress Concern Present (2024)    Received from [facility]    [employer] - Occupational Stress Questionnaire     Feeling of  Stress : Rather much   Social Connections: Unknown (5/7/2024)    Received from [facility]    Social Connection and Isolation Panel [NHANES]     Frequency of Communication with Friends and Family: More than three times a week     Frequency of Social Gatherings with Friends and Family: More than three times a week     Attends Zoroastrian Services: Never     Active Member of Clubs or Organizations: No     Attends Club or Organization Meetings: Never     Marital Status: Patient unable to answer   Housing Stability: Low Risk  (12/17/2024)    Housing Stability     Housing Instability: No        Allergies;  Allergies[1]     Medications:    Current Facility-Administered Medications:     sevelamer carbonate (Renvela) tab 2,400 mg, 2,400 mg, Oral, TID CC    hydrALAZINE (Apresoline) tab 50 mg, 50 mg, Oral, Q8H MARTHA    NIFEdipine ER (Procardia-XL) 24 hr tab 60 mg, 60 mg, Oral, BID    losartan (Cozaar) tab 100 mg, 100 mg, Oral, Daily    sodium chloride 0.9 % IV bolus 100 mL, 100 mL, Intravenous, Q30 Min PRN **AND** albumin human (Albumin) 25% injection 25 g, 25 g, Intravenous, PRN Dialysis    hydrocortisone (Anusol-HC) 25 MG rectal suppository 25 mg, 25 mg, Rectal, BID    acetaminophen (Tylenol Extra Strength) tab 500 mg, 500 mg, Oral, Q4H PRN    polyethylene glycol (PEG 3350) (Miralax) 17 g oral packet 17 g, 17 g, Oral, Daily PRN    sennosides (Senokot) tab 17.2 mg, 17.2 mg, Oral, Nightly PRN    bisacodyl (Dulcolax) 10 MG rectal suppository 10 mg, 10 mg, Rectal, Daily PRN    melatonin tab 3 mg, 3 mg, Oral, Nightly PRN    ondansetron (Zofran) 4 MG/2ML injection 4 mg, 4 mg, Intravenous, Q6H PRN    prochlorperazine (Compazine) 10 MG/2ML injection 5 mg, 5 mg, Intravenous, Q8H PRN    albuterol (Ventolin HFA) 108 (90 Base) MCG/ACT inhaler 2 puff, 2 puff, Inhalation, Q6H PRN    ARIPiprazole (Abilify) tab 15 mg, 15 mg, Oral, Daily    buPROPion ER (Wellbutrin XL) 24 hr tab 150 mg, 150 mg, Oral, Daily    carvedilol (Coreg) tab 25  mg, 25 mg, Oral, BID with meals    fenofibrate micronized (Lofibra) cap 134 mg, 134 mg, Oral, Nightly    FLUoxetine (PROzac) cap 40 mg, 40 mg, Oral, Daily    gabapentin (Neurontin) cap 200 mg, 200 mg, Oral, TID    lamoTRIgine (LaMICtal) tab 100 mg, 100 mg, Oral, Daily    memantine (Namenda) tab 5 mg, 5 mg, Oral, BID    tamsulosin (Flomax) cap 0.4 mg, 0.4 mg, Oral, Daily    amphetamine-dextroamphetamine (Adderall) tab 10 mg, 10 mg, Oral, BID AC    HYDROcodone-acetaminophen (Norco) 5-325 MG per tab 1 tablet, 1 tablet, Oral, Q6H PRN     REVIEW OF SYSTEMS:   10 point ros reviewed. Pertinent positive per HPI.          EXAM:   BP (!) 153/106 (BP Location: Right arm)   Pulse 93   Temp 98.2 °F (36.8 °C) (Oral)   Resp 18   Ht 6' 2\" (1.88 m)   Wt 240 lb (108.9 kg)   SpO2 98%   BMI 30.81 kg/m²  Body mass index is 30.81 kg/m².  GENERAL: Well developed, well nourished, in no obvious distress.   CV: RRR  PULM: CTAB  ABDOMEN: Bowel sounds normoactive. Soft, no organomegaly or masses appreciated. Nontender.   EXTREMITIES: No peripheral edema appreciated.   NEURO: Alert and Oriented x 3.        LAB/IMAGING RESULTS:     Lab Results   Component Value Date    WBC 5.5 12/18/2024    HGB 9.3 12/18/2024    HCT 27.4 12/18/2024    .0 12/18/2024       Lab Results   Component Value Date    WBC 5.5 12/18/2024    HGB 9.3 12/18/2024    HCT 27.4 12/18/2024    .0 12/18/2024    CREATSERUM 9.23 12/18/2024    BUN 51 12/18/2024     12/18/2024    K 3.8 12/18/2024     12/18/2024    CO2 24.0 12/18/2024     12/18/2024    CA 8.9 12/18/2024    ALB 3.9 12/18/2024    ALKPHO 96 12/17/2024    BILT 0.4 12/17/2024    TP 7.6 12/17/2024    AST 27 12/17/2024    ALT 13 12/17/2024    PHOS 6.4 12/18/2024         ASSESSMENT AND PLAN:   Hematochezia  Chronic anemia     EGD and colonoscopy reportedly normal August 2024. Normal capsule October 2024. Suspect anemia is 2/2 MGUS and chronic kidney disease and bleeding due to resolved  diverticular bleed.     - regular diet  - discharge  - 1 tbsp citrucel fiber powder in 8 ounces liquid daily    The patient indicates understanding of these issues and agrees to the plan. Will sign off at this time.  Chung Alfaro MD  12/18/2024           [1]   Allergies  Allergen Reactions    Hydrochlorothiazide RASH and HIVES

## 2024-12-18 NOTE — PLAN OF CARE
Pt A&Ox4, RA, VSS. C/o RLQ pain, given PRN pain meds with relief. No BM overnight, no n/v. Tolerating clear liquids well. Pt resting in bed with call light in reach.    Problem: GASTROINTESTINAL - ADULT  Goal: Minimal or absence of nausea and vomiting  Description: INTERVENTIONS:  - Maintain adequate hydration with IV or PO as ordered and tolerated  - Nasogastric tube to low intermittent suction as ordered  - Evaluate effectiveness of ordered antiemetic medications  - Provide nonpharmacologic comfort measures as appropriate  - Advance diet as tolerated, if ordered  - Obtain nutritional consult as needed  - Evaluate fluid balance  Outcome: Progressing  Goal: Maintains or returns to baseline bowel function  Description: INTERVENTIONS:  - Assess bowel function  - Maintain adequate hydration with IV or PO as ordered and tolerated  - Evaluate effectiveness of GI medications  - Encourage mobilization and activity  - Obtain nutritional consult as needed  - Establish a toileting routine/schedule  - Consider collaborating with pharmacy to review patient's medication profile  Outcome: Progressing

## 2024-12-19 ENCOUNTER — APPOINTMENT (OUTPATIENT)
Dept: NUCLEAR MEDICINE | Facility: HOSPITAL | Age: 44
End: 2024-12-19
Attending: INTERNAL MEDICINE
Payer: MEDICARE

## 2024-12-19 PROBLEM — N18.9 CHRONIC RENAL FAILURE, UNSPECIFIED CKD STAGE: Status: RESOLVED | Noted: 2023-11-11 | Resolved: 2024-12-19

## 2024-12-19 PROBLEM — K92.2 GASTROINTESTINAL HEMORRHAGE, UNSPECIFIED GASTROINTESTINAL HEMORRHAGE TYPE: Status: RESOLVED | Noted: 2024-03-23 | Resolved: 2024-12-19

## 2024-12-19 PROBLEM — K92.2 GASTROINTESTINAL HEMORRHAGE, UNSPECIFIED GASTROINTESTINAL HEMORRHAGE TYPE: Status: ACTIVE | Noted: 2024-12-19

## 2024-12-19 LAB
ALBUMIN SERPL-MCNC: 4 G/DL (ref 3.2–4.8)
ANION GAP SERPL CALC-SCNC: 11 MMOL/L (ref 0–18)
BASOPHILS # BLD AUTO: 0.07 X10(3) UL (ref 0–0.2)
BASOPHILS NFR BLD AUTO: 1 %
BUN BLD-MCNC: 30 MG/DL (ref 9–23)
CALCIUM BLD-MCNC: 8.8 MG/DL (ref 8.7–10.4)
CHLORIDE SERPL-SCNC: 101 MMOL/L (ref 98–112)
CO2 SERPL-SCNC: 26 MMOL/L (ref 21–32)
CREAT BLD-MCNC: 6.72 MG/DL
EGFRCR SERPLBLD CKD-EPI 2021: 10 ML/MIN/1.73M2 (ref 60–?)
EOSINOPHIL # BLD AUTO: 0.56 X10(3) UL (ref 0–0.7)
EOSINOPHIL NFR BLD AUTO: 7.7 %
ERYTHROCYTE [DISTWIDTH] IN BLOOD BY AUTOMATED COUNT: 14.7 %
GLUCOSE BLD-MCNC: 175 MG/DL (ref 70–99)
HCT VFR BLD AUTO: 26.1 %
HCT VFR BLD AUTO: 28.1 %
HGB BLD-MCNC: 8.8 G/DL
HGB BLD-MCNC: 9.7 G/DL
HGB BLD-MCNC: 9.7 G/DL
IMM GRANULOCYTES # BLD AUTO: 0.02 X10(3) UL (ref 0–1)
IMM GRANULOCYTES NFR BLD: 0.3 %
LYMPHOCYTES # BLD AUTO: 1.18 X10(3) UL (ref 1–4)
LYMPHOCYTES NFR BLD AUTO: 16.2 %
MCH RBC QN AUTO: 31.8 PG (ref 26–34)
MCHC RBC AUTO-ENTMCNC: 34.5 G/DL (ref 31–37)
MCV RBC AUTO: 92.1 FL
MONOCYTES # BLD AUTO: 0.99 X10(3) UL (ref 0.1–1)
MONOCYTES NFR BLD AUTO: 13.6 %
NEUTROPHILS # BLD AUTO: 4.46 X10 (3) UL (ref 1.5–7.7)
NEUTROPHILS # BLD AUTO: 4.46 X10(3) UL (ref 1.5–7.7)
NEUTROPHILS NFR BLD AUTO: 61.2 %
OSMOLALITY SERPL CALC.SUM OF ELEC: 296 MOSM/KG (ref 275–295)
PHOSPHATE SERPL-MCNC: 5.2 MG/DL (ref 2.4–5.1)
PLATELET # BLD AUTO: 210 10(3)UL (ref 150–450)
POTASSIUM SERPL-SCNC: 3.9 MMOL/L (ref 3.5–5.1)
RBC # BLD AUTO: 3.05 X10(6)UL
SODIUM SERPL-SCNC: 138 MMOL/L (ref 136–145)
WBC # BLD AUTO: 7.3 X10(3) UL (ref 4–11)

## 2024-12-19 PROCEDURE — 78278 ACUTE GI BLOOD LOSS IMAGING: CPT | Performed by: INTERNAL MEDICINE

## 2024-12-19 PROCEDURE — 85018 HEMOGLOBIN: CPT | Performed by: INTERNAL MEDICINE

## 2024-12-19 PROCEDURE — 85025 COMPLETE CBC W/AUTO DIFF WBC: CPT | Performed by: STUDENT IN AN ORGANIZED HEALTH CARE EDUCATION/TRAINING PROGRAM

## 2024-12-19 PROCEDURE — 36415 COLL VENOUS BLD VENIPUNCTURE: CPT | Performed by: INTERNAL MEDICINE

## 2024-12-19 PROCEDURE — 78830 RP LOCLZJ TUM SPECT W/CT 1: CPT | Performed by: INTERNAL MEDICINE

## 2024-12-19 PROCEDURE — 80069 RENAL FUNCTION PANEL: CPT | Performed by: INTERNAL MEDICINE

## 2024-12-19 PROCEDURE — 85014 HEMATOCRIT: CPT | Performed by: INTERNAL MEDICINE

## 2024-12-19 RX ORDER — HYDROMORPHONE HYDROCHLORIDE 1 MG/ML
0.2 INJECTION, SOLUTION INTRAMUSCULAR; INTRAVENOUS; SUBCUTANEOUS EVERY 2 HOUR PRN
Status: DISCONTINUED | OUTPATIENT
Start: 2024-12-19 | End: 2024-12-21

## 2024-12-19 RX ORDER — HYDRALAZINE HYDROCHLORIDE 25 MG/1
25 TABLET, FILM COATED ORAL 2 TIMES DAILY
COMMUNITY

## 2024-12-19 RX ORDER — HYDRALAZINE HYDROCHLORIDE 25 MG/1
25 TABLET, FILM COATED ORAL 2 TIMES DAILY
Status: DISCONTINUED | OUTPATIENT
Start: 2024-12-19 | End: 2024-12-21

## 2024-12-19 RX ORDER — HYDROMORPHONE HYDROCHLORIDE 1 MG/ML
0.4 INJECTION, SOLUTION INTRAMUSCULAR; INTRAVENOUS; SUBCUTANEOUS EVERY 2 HOUR PRN
Status: DISCONTINUED | OUTPATIENT
Start: 2024-12-19 | End: 2024-12-20

## 2024-12-19 RX ORDER — HYDROMORPHONE HYDROCHLORIDE 1 MG/ML
0.8 INJECTION, SOLUTION INTRAMUSCULAR; INTRAVENOUS; SUBCUTANEOUS EVERY 2 HOUR PRN
Status: DISCONTINUED | OUTPATIENT
Start: 2024-12-19 | End: 2024-12-20

## 2024-12-19 RX ORDER — LOSARTAN POTASSIUM 100 MG/1
100 TABLET ORAL DAILY
Qty: 30 TABLET | Refills: 0 | Status: SHIPPED | OUTPATIENT
Start: 2024-12-20 | End: 2025-01-19

## 2024-12-19 NOTE — PROGRESS NOTES
[facility]    Godwin Fonseca  4/12/1980  [id number]   Provider:  [unfilled]  Adrian Small MD  [unfilled]       Episode of brbpr today with abdominal discomfort.  Allergies:  Allergies[1]     Current Outpatient Prescriptions:    Current Facility-Administered Medications:     sodium ferric gluconate (Ferrlecit) 125 mg in sodium chloride 0.9% 100mL IVPB premix, 125 mg, Intravenous, Once    hydrALAZINE (Apresoline) tab 25 mg, 25 mg, Oral, BID    sevelamer carbonate (Renvela) tab 2,400 mg, 2,400 mg, Oral, TID CC    NIFEdipine ER (Procardia-XL) 24 hr tab 60 mg, 60 mg, Oral, BID    losartan (Cozaar) tab 100 mg, 100 mg, Oral, Daily    sodium chloride 0.9 % IV bolus 100 mL, 100 mL, Intravenous, Q30 Min PRN **AND** albumin human (Albumin) 25% injection 25 g, 25 g, Intravenous, PRN Dialysis    heparin (Porcine) 1000 UNIT/ML injection 1,500 Units, 1.5 mL, Intracatheter, Once    hydrocortisone (Anusol-HC) 25 MG rectal suppository 25 mg, 25 mg, Rectal, BID    acetaminophen (Tylenol Extra Strength) tab 500 mg, 500 mg, Oral, Q4H PRN    polyethylene glycol (PEG 3350) (Miralax) 17 g oral packet 17 g, 17 g, Oral, Daily PRN    sennosides (Senokot) tab 17.2 mg, 17.2 mg, Oral, Nightly PRN    bisacodyl (Dulcolax) 10 MG rectal suppository 10 mg, 10 mg, Rectal, Daily PRN    melatonin tab 3 mg, 3 mg, Oral, Nightly PRN    ondansetron (Zofran) 4 MG/2ML injection 4 mg, 4 mg, Intravenous, Q6H PRN    prochlorperazine (Compazine) 10 MG/2ML injection 5 mg, 5 mg, Intravenous, Q8H PRN    albuterol (Ventolin HFA) 108 (90 Base) MCG/ACT inhaler 2 puff, 2 puff, Inhalation, Q6H PRN    ARIPiprazole (Abilify) tab 15 mg, 15 mg, Oral, Daily    buPROPion ER (Wellbutrin XL) 24 hr tab 150 mg, 150 mg, Oral, Daily    carvedilol (Coreg) tab 25 mg, 25 mg, Oral, BID with meals    fenofibrate micronized (Lofibra) cap 134 mg, 134 mg, Oral, Nightly    FLUoxetine (PROzac) cap 40 mg, 40 mg, Oral, Daily    gabapentin (Neurontin) cap 200 mg, 200 mg, Oral, TID     lamoTRIgine (LaMICtal) tab 100 mg, 100 mg, Oral, Daily    memantine (Namenda) tab 5 mg, 5 mg, Oral, BID    tamsulosin (Flomax) cap 0.4 mg, 0.4 mg, Oral, Daily    amphetamine-dextroamphetamine (Adderall) tab 10 mg, 10 mg, Oral, BID AC    HYDROcodone-acetaminophen (Norco) 5-325 MG per tab 1 tablet, 1 tablet, Oral, Q6H PRN       HISTORY:  Past Medical History:    Anxiety state    Arrhythmia    Asthma ([facility])    Attention deficit hyperactivity disorder (ADHD)    Back problem    Bipolar 1 disorder ([facility])    CKD (chronic kidney disease) stage 3, GFR 30-59 ml/min ([facility])    Dr Meeks    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[facility])    Congestive heart disease ([facility])    COPD (chronic obstructive pulmonary disease) ([facility])    Coronary atherosclerosis    Deep vein thrombosis ([facility])    at age 17 R/T cast    Depression    Diabetes ([facility])    Dialysis patient ([facility])    Diverticulosis of large intestine    Essential hypertension    3/21 echo: severe concentric LVH with normal EF and no MR or pHTN    Extrinsic asthma, unspecified    Heart attack ([facility])    2016- angiogram- no intervention    Heart valve disease    mitral valve repair in 1994/    High blood pressure    High cholesterol    History of blood transfusion    History of mitral valve repair    Hyperlipidemia    Low back pain    tight and stiff after sweeping and mopping    LVH (left ventricular hypertrophy)    Migraines    Mixed hyperlipidemia     HDL 38 LDL 97 VLDL 57     Monoclonal gammopathy    IgG kappa     Muscle weakness    MVP (mitral valve prolapse)    Repair 1994 at [place]; echoes as recently as 3/21 show mild or trivial MR and no stenosis    Neuropathy    Osteoarthritis    hip ,knees    Pneumonia due to organism    Pulmonary embolism ([facility])    Renal disorder    Stroke ([facility])    TIA (transient ischemic attack)    Initial history of left-sided weakness and slurred speech. (+) cocaine. MRI of the brain, CT angiogram of the head and neck, and 2D echo are all unremarkable.     TMJ  (dislocation of temporomandibular joint)    Troponin level elevated    Trop 60 60 47 with TIA and no CP: Lexiscan negative with EF 51    Visual impairment      Past Surgical History:   Procedure Laterality Date    Av fistula revision, open Left     Cabg      Colonoscopy N/A 03/26/2023    Procedure: COLONOSCOPY;  Surgeon: Heath Vu MD;  Location:  ENDOSCOPY    Colonoscopy N/A 12/30/2023    Procedure: COLONOSCOPY with cold snare polypectomy and forcep polypectomy;  Surgeon: Ousmane Suarez MD;  Location:  ENDOSCOPY    Colonoscopy & polypectomy  2019    Egd  2019    Duodenitis. Biopsied. EUS for weight loss was negative    Heart surgery      Hernia surgery  08/17/2022    Dr Barnes    Laminectomy,>2 sgmt,lumbar  09/06/2018    L4-L5 Decomp Discectomy ROEM L4-L5    Mitralplasty w cp bypass  1994    [facility]iano: Repair    Repair rotator cuff,chronic Left     torn and had a ruptured bicep    Sinus surgery        Spine surgery procedure unlisted      Valve repair  1994    mitral valve      Family History   Problem Relation Age of Onset    Hypertension Father     Alcohol and Other Disorders Associated Father     Substance Abuse Father         cocaine    Dementia Father     Cancer Father         lung    Diabetes Mother     Cancer Mother         multiple myeloma    Hypertension Mother     Anxiety Maternal Aunt     Depression Maternal Aunt     Anxiety Maternal Aunt     Depression Maternal Aunt     Bipolar Disorder Maternal Aunt     Diabetes Maternal Grandmother     Hypertension Maternal Grandmother     Cancer Maternal Grandfather         stomach cancer    Diabetes Maternal Grandfather     Hypertension Maternal Grandfather     Alcohol and Other Disorders Associated Maternal Grandfather     Hypertension Paternal Grandmother     Hypertension Paternal Grandfather     Cancer Sister         uterine and ovarian    Hypertension Sister     Cancer Maternal Uncle         lung    Cancer Paternal Aunt         throat      Social History      Socioeconomic History    Marital status:    Tobacco Use    Smoking status: Former     Current packs/day: 0.00     Average packs/day: 1 pack/day for 27.0 years (27.0 ttl pk-yrs)     Types: Cigarettes     Start date: 1995     Quit date: 2022     Years since quittin.8     Passive exposure: Never    Smokeless tobacco: Never   Vaping Use    Vaping status: Never Used   Substance and Sexual Activity    Alcohol use: No    Drug use: No     Social Drivers of Health     Financial Resource Strain: Medium Risk (2024)    Received from [facility]    Overall Financial Resource Strain (CARDIA)     Difficulty of Paying Living Expenses: Somewhat hard   Food Insecurity: No Food Insecurity (2024)    Food Insecurity     Food Insecurity: Never true   Transportation Needs: No Transportation Needs (2024)    Transportation Needs     Lack of Transportation: No   Physical Activity: Inactive (2024)    Received from [facility]    Exercise Vital Sign     Days of Exercise per Week: 0 days     Minutes of Exercise per Session: 0 min   Stress: Stress Concern Present (2024)    Received from [facility]    [employer] - Occupational Stress Questionnaire     Feeling of Stress : Rather much   Social Connections: Unknown (2024)    Received from [facility]    Social Connection and Isolation Panel [NHANES]     Frequency of Communication with Friends and Family: More than three times a week     Frequency of Social Gatherings with Friends and Family: More than three times a week     Attends Sabianist Services: Never     Active Member of Clubs or Organizations: No     Attends Club or Organization Meetings: Never     Marital Status: Patient unable to answer   Housing Stability: Low Risk  (2024)    Housing Stability     Housing Instability: No               REVIEW OF SYSTEMS:   10 point ros reviewed. Pertinent positive per  HPI.      EXAM:   /80 (BP Location: Right arm)   Pulse 102   Temp 97.5 °F (36.4 °C) (Oral)   Resp 18   Ht 6' 2\" (1.88 m)   Wt 240 lb (108.9 kg)   SpO2 98%   BMI 30.81 kg/m²  Body mass index is 30.81 kg/m².    GENERAL: Well developed, well nourished, in no obvious distress.   CV: RRR  PULM: CTAB  ABDOMEN: Bowel sounds normoactive. Soft, no organomegaly or masses appreciated. Nontender.   EXTREMITIES: No peripheral edema appreciated.   NEURO: Alert and Oriented x 3.      LAB/IMAGING RESULTS:   [unfilled]  Lab Results   Component Value Date    WBC 7.3 12/19/2024    HGB 9.7 12/19/2024    HCT 28.1 12/19/2024    .0 12/19/2024    CREATSERUM 6.72 12/19/2024    BUN 30 12/19/2024     12/19/2024    K 3.9 12/19/2024     12/19/2024    CO2 26.0 12/19/2024     12/19/2024    CA 8.8 12/19/2024    ALB 4.0 12/19/2024    PHOS 5.2 12/19/2024         ASSESSMENT AND PLAN:   Hematochezia  Chronic anemia     EGD and colonoscopy reportedly normal August 2024. Normal capsule October 2024. Suspect anemia is 2/2 MGUS and chronic kidney disease and bleeding due to diverticular bleed. Recurrent episode today.     - clear liquid diet  - stat hgb stable, monitor q8h  - stat tagged rbd scan    Chung Alfaro MD  12/19/2024           [1]   Allergies  Allergen Reactions    Hydrochlorothiazide RASH and HIVES

## 2024-12-19 NOTE — PROGRESS NOTES
[facility] Hospitalist Progress Note     CC: Hospital Follow up    PCP: Adrian Small MD       Assessment/Plan:     Principal Problem:    Gastrointestinal hemorrhage, unspecified gastrointestinal hemorrhage type  Active Problems:    Chronic renal failure, unspecified CKD stage          44 year old male with PMH sig for TIA, ESRD on HD, HFpEF, HTN, T2DM, CAD, COPD, PE who presents for GI bleed. States has been happeing for 3-4 days wehre he sees BRBPR in toilet bowl.      Bright red blood per rectum  - Received IV fluids, DC patient is a dialysis patient  - Currently no further bleeding since admission  - Hemoglobin has been stable, check hemoglobin in the a.m.  - Recent EGD and colonoscopy reportedly normal in August 2024, normal capsule study in October 2024  - Suspect etiology to be secondary to diverticular bleed versus hemorrhoidal bleed  - GI on consult, recommendations appreciated     ESRD on hemodialysis  - Monday Wednesday Friday dialysis, HD today  - Renal on consult, appreciate recommendations     #cHTN  -Continue home Coreg, losartan, hydralazine, nifedipine.  IV labetalol as needed.     # Cervical radiculopathy  -Pain control as needed  -Follow-up with spine surgeon- knows that norco will not be prescribed at [facility]. Pt should follow up with his primary doc.     # Bipolar disorder  -Continue Abilify, bupropion, Lamictal     # Depression  -Continue fluoxetine     #COPD, chronic  -Continue home inhalers     #chronic diastolic HF        FN:  - IVF: dc ivf  - Diet:renal     DVT Prophy: scd, heparin subcu  Atrophy: ambulate as mary  Lines:piv     Dispo: anticipate dc tomorrow am     Outpatient records or previous hospital records reviewed.      Further recommendations pending patient's clinical course.  [facility] hospitalist to continue to follow patient while in house     Patient and/or patient's family given opportunity to ask questions and note understanding and agreeing with therapeutic plan as outlined     Thank  Minna Westfall DO [facility]ist  Answering Service number: [phone]       Subjective:     No CP, SOB, or N/V. Awaiting GI eval. Did not have BM today. HD today.    OBJECTIVE:    Blood pressure 105/65, pulse 97, temperature 98.4 °F (36.9 °C), temperature source Oral, resp. rate 18, height 6' 2\" (1.88 m), weight 240 lb (108.9 kg), SpO2 95%.    Temp:  [97.9 °F (36.6 °C)-98.4 °F (36.9 °C)] 98.4 °F (36.9 °C)  Pulse:  [] 97  Resp:  [16-20] 18  BP: (105-167)/() 105/65  SpO2:  [92 %-98 %] 95 %      Intake/Output:    Intake/Output Summary (Last 24 hours) at 12/19/2024 0120  Last data filed at 12/18/2024 2200  Gross per 24 hour   Intake 200 ml   Output --   Net 200 ml       Last 3 Weights   12/17/24 1735 240 lb (108.9 kg)   12/17/24 1704 249 lb 14.4 oz (113.4 kg)   12/17/24 1033 240 lb (108.9 kg)   12/03/24 1616 236 lb 6.4 oz (107.2 kg)   12/02/24 0442 242 lb 9.6 oz (110 kg)   12/01/24 0459 240 lb (108.9 kg)   12/01/24 0148 240 lb (108.9 kg)   12/03/24 1611 236 lb 12.8 oz (107.4 kg)       Exam   Gen: No acute distress, alert and oriented x3, no focal neurologic deficits, chronically ill appearing  Heent: NC AT, mucous memb clear, neck supple  Pulm: Lungs clear, normal respiratory effort  CV: Heart with regular rate and rhythm, no peripheral edema  Abd: Abdomen soft, nontender, nondistended, bowel sounds present  MSK: Full range of motion in extremities,  Skin: no rashes or lesions  Neuro: AO*3, motor intact, no sensory deficits  Psyc: appropriate mood and affect      Data Review:       Labs:     Recent Labs   Lab 12/17/24  1143 12/18/24  0631   RBC 3.13* 2.93*   HGB 10.1* 9.3*   HCT 28.9* 27.4*   MCV 92.3 93.5   MCH 32.3 31.7   MCHC 34.9 33.9   RDW 14.4 14.4   NEPRELIM 5.12 2.88   WBC 8.1 5.5   .0 223.0         Recent Labs   Lab 12/17/24  1143 12/18/24  0631   GLU 79 109*   BUN 49* 51*   CREATSERUM 8.63* 9.23*   EGFRCR 7* 7*   CA 9.2 8.9    140   K 3.8 3.8    103   CO2 27.0  24.0       Recent Labs   Lab 12/17/24  1143 12/18/24  0631   ALT 13  --    AST 27  --    ALB 4.5 3.9         Imaging:  XR CHEST AP PORTABLE  (CPT=71045)    Result Date: 12/17/2024  CONCLUSION:  1. Right dialysis catheter is stable. 2. Confluent opacity right lung base is consistent with effusion and atelectasis or pneumonia.   LOCATION:  [facility]      Dictated by (CST): Adrian Blevins MD on 12/17/2024 at 11:57 AM     Finalized by (CST): Adrian Blevins MD on 12/17/2024 at 11:58 AM          Meds:      sevelamer carbonate  2,400 mg Oral TID CC    hydrALAZINE  50 mg Oral Q8H MARTHA    NIFEdipine ER  60 mg Oral BID    losartan  100 mg Oral Daily    heparin  1.5 mL Intracatheter Once    hydrocortisone  25 mg Rectal BID    ARIPiprazole  15 mg Oral Daily    buPROPion ER  150 mg Oral Daily    carvedilol  25 mg Oral BID with meals    fenofibrate micronized  134 mg Oral Nightly    FLUoxetine HCl  40 mg Oral Daily    gabapentin  200 mg Oral TID    lamoTRIgine  100 mg Oral Daily    memantine  5 mg Oral BID    tamsulosin  0.4 mg Oral Daily    amphetamine-dextroamphetamine  10 mg Oral BID AC         sodium chloride **AND** albumin human    acetaminophen    polyethylene glycol (PEG 3350)    sennosides    bisacodyl    melatonin    ondansetron    prochlorperazine    albuterol    HYDROcodone-acetaminophen

## 2024-12-19 NOTE — DISCHARGE SUMMARY
[facility] Internal Medicine Hospitalist Discharge Summary     Patient ID:  Godwin Fonseca  44 year old  4/12/1980    Admit date: 12/17/2024    Discharge date: 12/19/24    Attending Physician: Jagdeep Joseph DO     Primary Care Physician: Adrian Small MD     Discharge Diagnoses: Hematochezia, Mild Diverticular Bleed, Chronic Anemia    Discharge Condition: stable    Disposition:  Home    Important Follow Ups:  - PCP: 1-2 weeks    Hospital Course:      44 year old male with PMH sig for TIA, ESRD on HD, HFpEF, HTN, T2DM, CAD, COPD, PE who presents for GI bleed. States has been happeing for 3-4 days wehre he sees BRBPR in toilet bowl.      Hematochezia  Resolved Diverticular bleed  - Recent EGD and colonoscopy reportedly normal in August 2024, normal capsule study in October 2024  - Suspect etiology to be secondary to diverticular bleed  - GI consulted and signed off, no further inteventions recommended     ESRD on hemodialysis  - Monday Wednesday Friday dialysis, resume normal HD schedule     #cHTN  -resume home medications     # Cervical radiculopathy  -Pain control as needed  -Follow-up with spine surgeon- knows that norco will not be prescribed at [facility]. Pt should follow up with his primary doc.     # Bipolar disorder  -Continue Abilify, bupropion, Lamictal     # Depression  -Continue fluoxetine     #COPD, chronic  -Continue home inhalers     #chronic diastolic HF- noted    Consults: IP CONSULT TO GASTROENTEROLOGY  IP CONSULT TO RESPIRATORY CARE  IP CONSULT TO NEPHROLOGY    Operative Procedures:        Patient instructions:      I as the attending physician reconciled the current and discharge medications on day of discharge.     Current Discharge Medication List        START taking these medications    Details   losartan 100 MG Oral Tab Take 1 tablet (100 mg total) by mouth daily.           CONTINUE these medications which have NOT CHANGED    Details    hydrALAZINE 25 MG Oral Tab Take 1 tablet (25 mg total) by mouth 2 (two) times daily.      gabapentin 100 MG Oral Cap Take 2 capsules (200 mg total) by mouth 3 (three) times daily.      NIFEdipine ER 30 MG Oral Tablet 24 Hr Take 1 tablet (30 mg total) by mouth daily. Hold if systolic blood pressure <130      hydrocortisone 25 MG Rectal Suppos Place 1 suppository (25 mg total) rectally 2 (two) times daily.      VYVANSE 60 MG Oral Cap Take 1 capsule (60 mg total) by mouth every morning.      lamoTRIgine 100 MG Oral Tab Take 1 tablet (100 mg total) by mouth daily.      memantine 5 MG Oral Tab Take 1 tablet (5 mg total) by mouth 2 (two) times daily.      albuterol 108 (90 Base) MCG/ACT Inhalation Aero Soln Inhale 2 puffs into the lungs every 6 (six) hours as needed for Wheezing.      tamsulosin 0.4 MG Oral Cap Take 1 capsule (0.4 mg total) by mouth daily.      ARIPiprazole 15 MG Oral Tab Take 1 tablet (15 mg total) by mouth daily.      buPROPion  MG Oral Tablet 24 Hr Take 1 tablet (150 mg total) by mouth daily.      FLUoxetine HCl 40 MG Oral Cap Take 1 capsule (40 mg total) by mouth daily.      RENVELA 800 MG Oral Tab Take 1 tablet (800 mg total) by mouth 3 (three) times daily with meals.      carvedilol 25 MG Oral Tab Take 1 tablet (25 mg total) by mouth in the morning and 1 tablet (25 mg total) in the evening. Take with meals.      Fenofibrate 134 MG Oral Cap Take 1 capsule by mouth nightly.      Fluticasone Propionate 50 MCG/ACT Nasal Suspension SPRAY ONCE INTO EACH NOSTRIL BID PRN             Activity: activity as tolerated  Diet: cardiac diet  Wound Care: as directed  Code Status: Full Code      Exam on day of discharge:     Vitals:    12/19/24 0750   BP: 124/80   Pulse: 102   Resp: 18   Temp: 97.5 °F (36.4 °C)       Physical Exam:   General: no acute distress  Heart: RRR  Lungs: CTAB  Abd: Soft, NT, ND  Neuro: no focal deficits      Total time coordinating care for discharge: Greater than 30 minutes    Jagdeep  MARCELINA Joseph  [facility] Hospitalist

## 2024-12-19 NOTE — DISCHARGE SUMMARY
[facility] HOSPITALIST  DISCHARGE SUMMARY     Godwin Fonseca Patient Status:  Observation    1980 MRN [id number]   Location [facility] 7NE-A Attending No att. providers found   Hosp Day # 0 PCP Adrian Small MD     Date of Admission: 2024  Date of Discharge: 2024  Discharge Disposition: Home or Self Care    Discharge Diagnosis:     Weakness  ESRD  Elevated trop  Hyperphosphatemia:  Non-obstructive minimal CAD on cath   H/o severe MR s/p MVR, redo complex robotically assisted MVR   Chronic HFpEF  HTN  Diaphoresis  Anemia  Nose bleeds  Finding of possible \"cystitis\" on imaging  LLQ lump- lipoma versus other subcutaneous nodule (?could it be from previous heparin injection site)  # Cervical radiculopathy  # Bipolar disorder  # Depression  #COPD, chronic       History of Present Illness:   Patient is a 44 year old male with PMH including but not limited to ADD, bipolar, HTN, DVT/PE, hyperlipidemia, DM, CKD on dialysis, MGUS, cervical spondylosis, and TIA in  who p/t EH ED c weakness and missed HD.  Pt states not doing well. Feels weak, legs giving out when going to bathroom. Exhausted. Feels like cuada equina (had it in 2018 from mass in spine). MIssed HD Friday. Some nose bleed.  When asked, last night +cp. Low BPs at home 80/60.   Saw Vishal Hurt MD at 2024. Still sees pyschiatrist. No tob/etoh/drug use, used to use cocaine.   Admitted 2024-2024 for similar, many similar prior admits.        Brief Synopsis:     Weakness  - likely 2/2 missed HD, stressed need to go to HD even if weak as weill only make him weaker; BP meds possibly contributing as well   - discussed narcotics can cause lethargy/drowsiness, agreeable to hold norco and just try tylenol   - PT/OT--> home therapy   - has seen as o/p Vishal Hurt MD on 2024.   -CT head and MRI brain negative   -neuro following --> mri spine ordered >> result  pending     ESRD  - per renal, plan HD T and W,  got Sun     Elevated trop  - has had before, chronic   - apprec cards recs      Hyperphosphatemia:  -- sevelamer 2400 TID AC     Non-obstructive minimal CAD on cath 4/24  H/o severe MR s/p MVR, redo complex robotically assisted MVR 2022  Chronic HFpEF  HTN  - Hold hydralazine, losartan, and nifedipine given lower BPs and orthostatsis and weakness; coreg ok, dose lowered. Follow BP and sxs, d/w Yesenia Rubin MD   - pt reports Low BPs at home 80/60s  - echo EF 60-65%, G2DD  - h/o cath 2023 with mild irregularities     Diaphoresis  - noted later in day, d/w MD Brandy   - check Blood cxs peripherally and from line, PCT, LA, CXR, UA (if makes urine), CXR; temp/WBC ok--> W/U currently neg  - drsg for line when came in not optimal      Anemia  Nose bleeds  - 2/2 ESRD  - trend     Finding of possible \"cystitis\" on imaging  - seen on previous CT as well  -Patient has had cystoscopy that looked okay in the past per his report.  He also has been treated for possible prostatitis.  He does seem to have more chronic discomfort in the suprapubic area.     LLQ lump- lipoma versus other subcutaneous nodule (?could it be from previous heparin injection site)  -No findings on imaging to suggest a deeper process.    - o/p f/u       # Cervical radiculopathy  -Pain control as needed  -Follow-up with spine surgeon  -MRI spine ordered per neuro      # Bipolar disorder  -Continue Abilify, bupropion, Lamictal   - follows c pyschiatrist, reports dose adjustment in meds     # Depression  -Continue fluoxetine      #COPD, chronic  -Continue home inhalers     DC INSTRUCTIONS  Dc planning in process  dc today  Nephro, cards following  PT/OT       Lace+ Score: 78  59-90 High Risk  29-58 Medium Risk  0-28   Low Risk       TCM Follow-Up Recommendation:  LACE > 58: High Risk of readmission after discharge from the hospital.    Procedures during hospitalization:   none    Incidental or significant findings and recommendations (brief  descriptions):  none    Lab/Test results pending at Discharge:   none    Consultants:  Nephmehran Donald  NSGY    Discharge Medication List:     Discharge Medications        START taking these medications        Instructions Prescription details   gabapentin 100 MG Caps  Commonly known as: Neurontin      Take 2 capsules (200 mg total) by mouth 3 (three) times daily.   Quantity: 180 capsule  Refills: 0            CHANGE how you take these medications        Instructions Prescription details   NIFEdipine ER 30 MG Tb24  Commonly known as: Adalat CC  What changed:   medication strength  how much to take  when to take this      Take 1 tablet (30 mg total) by mouth daily. Hold if systolic blood pressure <130   Stop taking on: January 6, 2025  Quantity: 30 tablet  Refills: 0            CONTINUE taking these medications        Instructions Prescription details   albuterol 108 (90 Base) MCG/ACT Aers  Commonly known as: Ventolin HFA      Inhale 2 puffs into the lungs every 6 (six) hours as needed for Wheezing.   Refills: 0     ARIPiprazole 15 MG Tabs  Commonly known as: Abilify      Take 1 tablet (15 mg total) by mouth daily.   Refills: 0     buPROPion  MG Tb24  Commonly known as: Wellbutrin XL      Take 1 tablet (150 mg total) by mouth daily.   Refills: 0     carvedilol 25 MG Tabs  Commonly known as: Coreg      Take 1 tablet (25 mg total) by mouth in the morning and 1 tablet (25 mg total) in the evening. Take with meals.   Quantity: 60 tablet  Refills: 6     Fenofibrate 134 MG Caps      Take 1 capsule by mouth nightly.   Quantity: 90 capsule  Refills: 1     FLUoxetine HCl 40 MG Caps  Commonly known as: PROZAC      Take 1 capsule (40 mg total) by mouth daily.   Quantity: 7 capsule  Refills: 0     fluticasone propionate 50 MCG/ACT Susp  Commonly known as: Flonase      SPRAY ONCE INTO EACH NOSTRIL BID PRN   Quantity: 15.8 mL  Refills: 0     hydrocortisone 25 MG Supp  Commonly known as: Anusol-HC      Place 1 suppository (25  mg total) rectally 2 (two) times daily.   Stop taking on: December 19, 2024  Quantity: 60 suppository  Refills: 0     lamoTRIgine 100 MG Tabs  Commonly known as: LaMICtal      Take 1 tablet (100 mg total) by mouth daily.   Refills: 0     memantine 5 MG Tabs  Commonly known as: Namenda      Take 1 tablet (5 mg total) by mouth 2 (two) times daily.   Refills: 0     Renvela 800 MG Tabs  Generic drug: sevelamer carbonate      Take 1 tablet (800 mg total) by mouth 3 (three) times daily with meals.   Refills: 0     tamsulosin 0.4 MG Caps  Commonly known as: Flomax      Take 1 capsule (0.4 mg total) by mouth daily.   Refills: 0     Vyvanse 60 MG Caps  Generic drug: Lisdexamfetamine Dimesylate      Take 1 capsule (60 mg total) by mouth every morning.   Refills: 0            STOP taking these medications      hydrALAZINE 50 MG Tabs  Commonly known as: Apresoline        HYDROcodone-acetaminophen 5-325 MG Tabs  Commonly known as: Norco        losartan 100 MG Tabs  Commonly known as: Cozaar                  Where to Get Your Medications        These medications were sent to [id number] - [place], [place] - [address] [phone], [phone]  [address] [address]      Phone: [phone]   gabapentin 100 MG Caps  NIFEdipine ER 30 MG Tb24         [place] reviewed[place]   [place]      Follow-up appointment:   Adrian Arce MD  [address] [facility] [address] [phone]    Follow up in 1 week(s)      Antonio Chaparro MD  [address] [address] [phone]    Follow up in 1 month(s)      Appointments for Next 30 Days 12/18/2024 - 1/17/2025      None            OBJECTIVE:  Temp:  [97.9 °F (36.6 °C)-98.4 °F (36.9 °C)] 98.1 °F (36.7 °C)  Pulse:  [] 104  Resp:  [13-25] 25  BP: (119-168)/() 148/99  SpO2:  [95 %-99 %] 98 %     Exam     General: Alert, no distress, appears stated age.     Lungs:   Clear to auscultation bilaterally. Normal effort, no crackles, no wheezing   Heart:   Regular rate and rhythm, S1, S2 normal, no murmur,   Abdomen:   Soft, NT/ND, Bowel sounds normal. Norebound or guarding   Extremities: Extremities normal, atraumatic, no LE edema.   Neurologic: Moving all extremities spontaneously, no focal deficit appreciated      -----------------------------------------------------------------------------------------------  PATIENT DISCHARGE INSTRUCTIONS: See electronic chart    Ria Gutierrez MD    Time spent:  > 30 minutes

## 2024-12-19 NOTE — DISCHARGE INSTRUCTIONS
Amira Fonseca,    Thank you for allowing us to take care of your health during your admission at [facility]. You are stable for discharge at this time. Your diagnoses and specific instructions for your medications are listed below. Please ensure you follow up with your PCP in 1-2 weeks on discharge and all other follow up appointments listed below.    Diagnoses: Hematochezia, Chronica Anemia, Diverticular Bleed    Changes to Medications:  - Start fiber daily    Follow Ups:  - PCP in 1-2 weeks    Best Wishes and Good Jagdeep Anderson D.O.  [facility] Hospitalist

## 2024-12-19 NOTE — PROGRESS NOTES
[facility]   part of [facility]    Nephrology Progress Note    Godwin Fonseca Attending:  Jagdeep Joseph DO       SUBJECTIVE:     Feels better overall.  Denies more bleeding episodes.  Was not able to use the AVF for dialysis yesterday and he states he has an appointment already to follow up with vascular surgery regarding his AVF malfunction.    PHYSICAL EXAM:     Vital Signs: /80 (BP Location: Right arm)   Pulse 102   Temp 97.5 °F (36.4 °C) (Oral)   Resp 18   Ht 188 cm (6' 2\")   Wt 240 lb (108.9 kg)   SpO2 98%   BMI 30.81 kg/m²   Temp (24hrs), Av.1 °F (36.7 °C), Min:97.5 °F (36.4 °C), Max:98.4 °F (36.9 °C)       Intake/Output Summary (Last 24 hours) at 2024 1110  Last data filed at 2024 2200  Gross per 24 hour   Intake 200 ml   Output --   Net 200 ml     Wt Readings from Last 3 Encounters:   24 240 lb (108.9 kg)   24 236 lb 6.4 oz (107.2 kg)   24 236 lb 12.8 oz (107.4 kg)        General: pleasant, well appearing  Skin: no visible rashes  HEENT: NCAT  Cardiac: Regular rate and rhythm, 3/6 systolic murmur  Lungs: CTAB, no wheeze, no rale, no rhonchi  Abdomen: Soft, NTND  Extremities: warm, well perfused, no leg edema  Neurologic/Psych: mentating well, no asterixis  RIJ tunnelled HD catheter  LUE AVF + thrill and bruit    LABORATORY DATA:     Lab Results   Component Value Date     (H) 2024    BUN 30 (H) 2024    BUNCREA 13.0 2022    CREATSERUM 6.72 (H) 2024    ANIONGAP 11 2024    GFR 59 (L) 2018    GFRNAA 30 (L) 2022    GFRAA 35 (L) 2022    CA 8.8 2024    OSMOCALC 296 (H) 2024    ALKPHO 96 2024    AST 27 2024    ALT 13 2024    BILT 0.4 2024    TP 7.6 2024    ALB 4.0 2024    GLOBULIN 3.1 2024     2024    K 3.9 2024     2024    CO2 26.0 2024     Lab Results   Component Value Date    WBC 7.3 2024    RBC 3.05 (L)  12/19/2024    HGB 9.7 (L) 12/19/2024    HCT 28.1 (L) 12/19/2024    .0 12/19/2024    MPV 11.5 12/14/2012    MCV 92.1 12/19/2024    MCH 31.8 12/19/2024    MCHC 34.5 12/19/2024    RDW 14.7 12/19/2024    NEPRELIM 4.46 12/19/2024    NEPERCENT 61.2 12/19/2024    LYPERCENT 16.2 12/19/2024    MOPERCENT 13.6 12/19/2024    EOPERCENT 7.7 12/19/2024    BAPERCENT 1.0 12/19/2024    NE 4.46 12/19/2024    LYMABS 1.18 12/19/2024    MOABSO 0.99 12/19/2024    EOABSO 0.56 12/19/2024    BAABSO 0.07 12/19/2024     Lab Results   Component Value Date    MALBP 167.00 05/24/2023    CREUR 142.00 05/24/2023    CREUR 142.00 05/24/2023     Lab Results   Component Value Date    COLORUR Light-Yellow 11/17/2024    CLARITY Clear 11/17/2024    SPECGRAVITY 1.013 11/17/2024    GLUUR Normal 11/17/2024    BILUR Negative 11/17/2024    KETUR Negative 11/17/2024    BLOODURINE Negative 11/17/2024    PHURINE 8.5 (H) 11/17/2024    PROUR 100 (A) 11/17/2024    UROBILINOGEN Normal 11/17/2024    NITRITE Negative 11/17/2024    LEUUR Negative 11/17/2024    WBCUR 1-5 11/17/2024    RBCUR 0-2 11/17/2024    EPIUR Few (A) 11/17/2024    BACUR Rare (A) 11/17/2024    CAOXUR Occasional (A) 04/20/2018    HYLUR Present (A) 05/14/2019         IMAGING:     Reviewed.    MEDICATIONS:       Current Facility-Administered Medications   Medication Dose Route Frequency    sodium ferric gluconate (Ferrlecit) 125 mg in sodium chloride 0.9% 100mL IVPB premix  125 mg Intravenous Once    hydrALAZINE (Apresoline) tab 25 mg  25 mg Oral BID    sevelamer carbonate (Renvela) tab 2,400 mg  2,400 mg Oral TID CC    NIFEdipine ER (Procardia-XL) 24 hr tab 60 mg  60 mg Oral BID    losartan (Cozaar) tab 100 mg  100 mg Oral Daily    sodium chloride 0.9 % IV bolus 100 mL  100 mL Intravenous Q30 Min PRN    And    albumin human (Albumin) 25% injection 25 g  25 g Intravenous PRN Dialysis    heparin (Porcine) 1000 UNIT/ML injection 1,500 Units  1.5 mL Intracatheter Once    hydrocortisone (Anusol-HC)  25 MG rectal suppository 25 mg  25 mg Rectal BID    acetaminophen (Tylenol Extra Strength) tab 500 mg  500 mg Oral Q4H PRN    polyethylene glycol (PEG 3350) (Miralax) 17 g oral packet 17 g  17 g Oral Daily PRN    sennosides (Senokot) tab 17.2 mg  17.2 mg Oral Nightly PRN    bisacodyl (Dulcolax) 10 MG rectal suppository 10 mg  10 mg Rectal Daily PRN    melatonin tab 3 mg  3 mg Oral Nightly PRN    ondansetron (Zofran) 4 MG/2ML injection 4 mg  4 mg Intravenous Q6H PRN    prochlorperazine (Compazine) 10 MG/2ML injection 5 mg  5 mg Intravenous Q8H PRN    albuterol (Ventolin HFA) 108 (90 Base) MCG/ACT inhaler 2 puff  2 puff Inhalation Q6H PRN    ARIPiprazole (Abilify) tab 15 mg  15 mg Oral Daily    buPROPion ER (Wellbutrin XL) 24 hr tab 150 mg  150 mg Oral Daily    carvedilol (Coreg) tab 25 mg  25 mg Oral BID with meals    fenofibrate micronized (Lofibra) cap 134 mg  134 mg Oral Nightly    FLUoxetine (PROzac) cap 40 mg  40 mg Oral Daily    gabapentin (Neurontin) cap 200 mg  200 mg Oral TID    lamoTRIgine (LaMICtal) tab 100 mg  100 mg Oral Daily    memantine (Namenda) tab 5 mg  5 mg Oral BID    tamsulosin (Flomax) cap 0.4 mg  0.4 mg Oral Daily    amphetamine-dextroamphetamine (Adderall) tab 10 mg  10 mg Oral BID AC    HYDROcodone-acetaminophen (Norco) 5-325 MG per tab 1 tablet  1 tablet Oral Q6H PRN       ASSESSMENT/PLAN:     43 yo M with history of ESRD on iHD MWF with LUE AVF and RIJ tunnelled HD catheter, HTN, severe MR s/p MVR x2, HFpEF, DVT/PE, TIA, MGUS, bipolar disorder, recurrent GI bleed presented with rectal bleeding x 3-4 days.      ESRD:  -- HD tomorrow per outpatient schedule     Anemia in ESRD:  -- ferrlicit x 1 for borderline low tsat  -- OWEN with outpatient HD     Hyperphosphatemia:  -- resumed sevelamer 2400 TID with meals  -- low phos diet     HTN:  -- resumed antihypertensives as he was previously taking     LUE AVF with malfunction:  -- L arm precautions  -- outpatient vascular surgery follow  up    Rectal bleeding:  -- per GI    Ok to discharge from nephrology standpoint.  Discussed with hospitalist medicine.       Thank you for allowing me to participate in the care of this patient. Please do not hesitate to call with questions or concerns.        Lenka Bond MD  [facility]  [address] [place] [id number]    12/19/2024  11:10 AM

## 2024-12-19 NOTE — PLAN OF CARE
Patient is alert and oriented, denies pain or SOB. HD completed last night. NO BM or signs of bleeding overnight. Tolerated regular diet. Possible discharge home. Safety precautions in place, plan of care ongoing.     Problem: GASTROINTESTINAL - ADULT  Goal: Minimal or absence of nausea and vomiting  Description: INTERVENTIONS:  - Maintain adequate hydration with IV or PO as ordered and tolerated  - Nasogastric tube to low intermittent suction as ordered  - Evaluate effectiveness of ordered antiemetic medications  - Provide nonpharmacologic comfort measures as appropriate  - Advance diet as tolerated, if ordered  - Obtain nutritional consult as needed  - Evaluate fluid balance  Outcome: Progressing  Goal: Maintains or returns to baseline bowel function  Description: INTERVENTIONS:  - Assess bowel function  - Maintain adequate hydration with IV or PO as ordered and tolerated  - Evaluate effectiveness of GI medications  - Encourage mobilization and activity  - Obtain nutritional consult as needed  - Establish a toileting routine/schedule  - Consider collaborating with pharmacy to review patient's medication profile  Outcome: Progressing

## 2024-12-19 NOTE — PROGRESS NOTES
[facility]   part of [facility] Hospitalist Progress Note     Godwin Fonseca Patient Status:  Observation    1980 MRN [id number]   Location [facility] 4NW-A Attending Jagdeep Joseph, DO    Day # 0 PCP Adrian Small MD     Assessment & Plan:    44 year old male with PMH sig for TIA, ESRD on HD, HFpEF, HTN, T2DM, CAD, COPD, PE who presents for GI bleed.     Hematochezia - intermittent  Diverticular bleed?/Diverticulitis?  - Recent EGD and colonoscopy reportedly normal in 2024, normal capsule study in 2024  - Suspect etiology to be secondary to diverticular bleed  - GI consulted and signed off, no further inteventions recommended, then patient had another episode of hematochezia w/ dizziness/hypotension, GI notified, Tagged RBC scan ordered  - H/H every 8     ESRD on hemodialysis  -  dialysis, neph on for dialysis needs     #HTN  -continue Losartan, Coreg, Hydral, Nifedipine     #Cervical radiculopathy  -Pain control as needed  -Follow-up with spine surgeon- knows that norco will not be prescribed at [place]. Pt should follow up with his primary doc.     # Bipolar disorder  -Continue Abilify, bupropion, Lamictal     # Depression  -Continue fluoxetine     #COPD, chronic  -Continue home inhalers     #chronic diastolic HF- noted    DVT Ppx: Holding due to concern for bleeding, SCDs    Subjective:     Patient seen and examined this morning at bedside. Doing well, feeling better. Later notified by primary RN regarding hypotension, dizziness after recurrent hematochezia episode prior to discharge    Vital signs:  Temp:  [97.5 °F (36.4 °C)-98.4 °F (36.9 °C)] 97.5 °F (36.4 °C)  Pulse:  [] 88  Resp:  [16-18] 18  BP: (105-145)/(65-93) 112/69  SpO2:  [83 %-98 %] 83 %    Physical Exam:    General: No acute distress.   Respiratory: Clear to auscultation bilaterally. No wheezes.  Cardiovascular: S1, S2. Regular rate and rhythm  Abdomen: Soft,  nontender, nondistended.  Positive bowel sounds. No rebound or guarding.  Extremities: No edema.  Neuro:  Grossly non focal, no motor deficits.      Diagnostic Data:    Pertinent Labs and Imaging independently reviewed  Comments:  Repeat HgB 9.7    Jagdeep Joseph D.O.  [facility]

## 2024-12-20 PROBLEM — K92.2 GASTROINTESTINAL HEMORRHAGE, UNSPECIFIED GASTROINTESTINAL HEMORRHAGE TYPE: Status: RESOLVED | Noted: 2024-12-19 | Resolved: 2024-12-20

## 2024-12-20 LAB
ALBUMIN SERPL-MCNC: 4 G/DL (ref 3.2–4.8)
ANION GAP SERPL CALC-SCNC: 14 MMOL/L (ref 0–18)
BASOPHILS # BLD AUTO: 0.06 X10(3) UL (ref 0–0.2)
BASOPHILS NFR BLD AUTO: 1 %
BUN BLD-MCNC: 42 MG/DL (ref 9–23)
CALCIUM BLD-MCNC: 8.9 MG/DL (ref 8.7–10.4)
CHLORIDE SERPL-SCNC: 101 MMOL/L (ref 98–112)
CO2 SERPL-SCNC: 24 MMOL/L (ref 21–32)
CREAT BLD-MCNC: 8.79 MG/DL
EGFRCR SERPLBLD CKD-EPI 2021: 7 ML/MIN/1.73M2 (ref 60–?)
EOSINOPHIL # BLD AUTO: 0.57 X10(3) UL (ref 0–0.7)
EOSINOPHIL NFR BLD AUTO: 9.2 %
ERYTHROCYTE [DISTWIDTH] IN BLOOD BY AUTOMATED COUNT: 14.6 %
GLUCOSE BLD-MCNC: 94 MG/DL (ref 70–99)
HCT VFR BLD AUTO: 26.6 %
HCT VFR BLD AUTO: 27.2 %
HGB BLD-MCNC: 9 G/DL
HGB BLD-MCNC: 9.3 G/DL
IMM GRANULOCYTES # BLD AUTO: 0.01 X10(3) UL (ref 0–1)
IMM GRANULOCYTES NFR BLD: 0.2 %
LYMPHOCYTES # BLD AUTO: 1.46 X10(3) UL (ref 1–4)
LYMPHOCYTES NFR BLD AUTO: 23.5 %
MCH RBC QN AUTO: 32.6 PG (ref 26–34)
MCHC RBC AUTO-ENTMCNC: 35 G/DL (ref 31–37)
MCV RBC AUTO: 93.3 FL
MONOCYTES # BLD AUTO: 0.86 X10(3) UL (ref 0.1–1)
MONOCYTES NFR BLD AUTO: 13.8 %
NEUTROPHILS # BLD AUTO: 3.26 X10 (3) UL (ref 1.5–7.7)
NEUTROPHILS # BLD AUTO: 3.26 X10(3) UL (ref 1.5–7.7)
NEUTROPHILS NFR BLD AUTO: 52.3 %
OSMOLALITY SERPL CALC.SUM OF ELEC: 298 MOSM/KG (ref 275–295)
PHOSPHATE SERPL-MCNC: 5.2 MG/DL (ref 2.4–5.1)
PLATELET # BLD AUTO: 206 10(3)UL (ref 150–450)
POTASSIUM SERPL-SCNC: 3.9 MMOL/L (ref 3.5–5.1)
RBC # BLD AUTO: 2.85 X10(6)UL
SODIUM SERPL-SCNC: 139 MMOL/L (ref 136–145)
WBC # BLD AUTO: 6.2 X10(3) UL (ref 4–11)

## 2024-12-20 PROCEDURE — 85025 COMPLETE CBC W/AUTO DIFF WBC: CPT | Performed by: INTERNAL MEDICINE

## 2024-12-20 PROCEDURE — 80069 RENAL FUNCTION PANEL: CPT | Performed by: INTERNAL MEDICINE

## 2024-12-20 PROCEDURE — 85014 HEMATOCRIT: CPT | Performed by: INTERNAL MEDICINE

## 2024-12-20 PROCEDURE — 85018 HEMOGLOBIN: CPT | Performed by: INTERNAL MEDICINE

## 2024-12-20 PROCEDURE — 90935 HEMODIALYSIS ONE EVALUATION: CPT | Performed by: PEDIATRICS

## 2024-12-20 RX ORDER — SEVELAMER CARBONATE 800 MG/1
2400 TABLET, FILM COATED ORAL
Status: SHIPPED | COMMUNITY
Start: 2024-12-20

## 2024-12-20 RX ORDER — ALBUMIN (HUMAN) 12.5 G/50ML
25 SOLUTION INTRAVENOUS
Status: DISCONTINUED | OUTPATIENT
Start: 2024-12-20 | End: 2024-12-21

## 2024-12-20 RX ORDER — HEPARIN SODIUM 1000 [USP'U]/ML
1500 INJECTION, SOLUTION INTRAVENOUS; SUBCUTANEOUS
Status: COMPLETED | OUTPATIENT
Start: 2024-12-20 | End: 2024-12-20

## 2024-12-20 NOTE — DISCHARGE SUMMARY
[facility] Internal Medicine Hospitalist Discharge Summary     Patient ID:  Godwin Fonseca  44 year old  4/12/1980    Admit date: 12/17/2024    Discharge date: 12/20/24    Attending Physician: Jagdeep Joseph DO     Primary Care Physician: Adrian Small MD     Discharge Diagnoses: Hematochezia, Diverticular Bleed, Chronic Anemia    Discharge Condition: stable    Disposition:  Home    Important Follow Ups:  - PCP: 1-2 weeks    Hospital Course:      44 year old male with PMH sig for TIA, ESRD on HD, HFpEF, HTN, T2DM, CAD, COPD, PE who presents for GI bleed.     Hematochezia - intermittent  Diverticular bleed?/Diverticulitis?  - Recent EGD and colonoscopy reportedly normal in August 2024, normal capsule study in October 2024  - Tagged RBC scan negative  - Suspect etiology to be secondary to diverticular bleed  - GI consulted and signed off, no further inteventions recommended, daily fiber     ESRD on hemodialysis  - Resume normal dialysis schedule     #HTN  -continue Losartan, Coreg, Hydral, Nifedipine     #Cervical radiculopathy  -Pain control as needed  -Follow-up with spine surgeon- knows that norco will not be prescribed at [place]. Pt should follow up with his primary doc.     # Bipolar disorder  -Continue Abilify, bupropion, Lamictal     # Depression  -Continue fluoxetine     #COPD, chronic  -Continue home inhalers     #chronic diastolic HF- noted    Consults: IP CONSULT TO GASTROENTEROLOGY  IP CONSULT TO RESPIRATORY CARE  IP CONSULT TO NEPHROLOGY    Operative Procedures:        Patient instructions:      I as the attending physician reconciled the current and discharge medications on day of discharge.     Current Discharge Medication List        START taking these medications    Details   losartan 100 MG Oral Tab Take 1 tablet (100 mg total) by mouth daily.           CONTINUE these medications which have NOT CHANGED    Details   hydrALAZINE 25 MG Oral Tab Take 1  tablet (25 mg total) by mouth 2 (two) times daily.      gabapentin 100 MG Oral Cap Take 2 capsules (200 mg total) by mouth 3 (three) times daily.      NIFEdipine ER 30 MG Oral Tablet 24 Hr Take 1 tablet (30 mg total) by mouth daily. Hold if systolic blood pressure <130      VYVANSE 60 MG Oral Cap Take 1 capsule (60 mg total) by mouth every morning.      lamoTRIgine 100 MG Oral Tab Take 1 tablet (100 mg total) by mouth daily.      memantine 5 MG Oral Tab Take 1 tablet (5 mg total) by mouth 2 (two) times daily.      albuterol 108 (90 Base) MCG/ACT Inhalation Aero Soln Inhale 2 puffs into the lungs every 6 (six) hours as needed for Wheezing.      tamsulosin 0.4 MG Oral Cap Take 1 capsule (0.4 mg total) by mouth daily.      ARIPiprazole 15 MG Oral Tab Take 1 tablet (15 mg total) by mouth daily.      buPROPion  MG Oral Tablet 24 Hr Take 1 tablet (150 mg total) by mouth daily.      FLUoxetine HCl 40 MG Oral Cap Take 1 capsule (40 mg total) by mouth daily.      RENVELA 800 MG Oral Tab Take 1 tablet (800 mg total) by mouth 3 (three) times daily with meals.      carvedilol 25 MG Oral Tab Take 1 tablet (25 mg total) by mouth in the morning and 1 tablet (25 mg total) in the evening. Take with meals.      Fenofibrate 134 MG Oral Cap Take 1 capsule by mouth nightly.      Fluticasone Propionate 50 MCG/ACT Nasal Suspension SPRAY ONCE INTO EACH NOSTRIL BID PRN           STOP taking these medications       hydrocortisone 25 MG Rectal Suppos              Activity: activity as tolerated  Diet: cardiac diet  Wound Care: as directed  Code Status: Full Code      Exam on day of discharge:     Vitals:    12/20/24 0821   BP: 151/88   Pulse: 92   Resp: 18   Temp: 98 °F (36.7 °C)       Physical Exam:   General: no acute distress  Heart: RRR  Lungs: CTAB  Abd: Soft, NT, ND  Neuro: no focal deficits      Total time coordinating care for discharge: Greater than 30 minutes    MARCELINA العلي [facility] Hospitalist

## 2024-12-20 NOTE — PLAN OF CARE
Patient is alert and oriented, on RA, vitals stable, no fever overnight. Hgb stable, no signs of bleeding noted overnight., PRN Dilaudid given for abdominal cramping.  Blood loss study (-) for active bleed. Safety precaution sin place, plan of care ongoing.     Problem: GASTROINTESTINAL - ADULT  Goal: Minimal or absence of nausea and vomiting  Description: INTERVENTIONS:  - Maintain adequate hydration with IV or PO as ordered and tolerated  - Nasogastric tube to low intermittent suction as ordered  - Evaluate effectiveness of ordered antiemetic medications  - Provide nonpharmacologic comfort measures as appropriate  - Advance diet as tolerated, if ordered  - Obtain nutritional consult as needed  - Evaluate fluid balance  12/19/2024 2100 by Frances Fisher RN  Outcome: Progressing  12/19/2024 2059 by Frances Fisher RN  Outcome: Progressing     Problem: GASTROINTESTINAL - ADULT  Goal: Maintains or returns to baseline bowel function  Description: INTERVENTIONS:  - Assess bowel function  - Maintain adequate hydration with IV or PO as ordered and tolerated  - Evaluate effectiveness of GI medications  - Encourage mobilization and activity  - Obtain nutritional consult as needed  - Establish a toileting routine/schedule  - Consider collaborating with pharmacy to review patient's medication profile  12/19/2024 2100 by Frances Fisher, RN  Outcome: Not Progressing  12/19/2024 2059 by Frances Fisher RN  Outcome: Not Progressing     Problem: ANEMIA OF PREMATURITY  Goal: Hematocrit/Hemoblobin within normal range  Description: Interventions:  - Monitor CBC as ordered  - Administer Iron and nutrition supplements as ordered  - Administer epoetin sylvia as ordered  - Administer blood products as ordered  - Educate parent/family on condition and treatment plan  Outcome: Not Progressing

## 2024-12-20 NOTE — PROGRESS NOTES
[facility]  4/12/1980  [id number]   Provider:  [email or web address]@  Adrian Small MD  [unfilled]       No further bleeding today.  Allergies:  Allergies[1]     Current Outpatient Prescriptions:    Current Facility-Administered Medications:     sodium chloride 0.9 % IV bolus 100 mL, 100 mL, Intravenous, Q30 Min PRN **AND** albumin human (Albumin) 25% injection 25 g, 25 g, Intravenous, PRN Dialysis    epoetin sylvia (Epogen, Procrit) 06820 UNIT/ML injection 10,000 Units, 10,000 Units, Subcutaneous, Once    hydrALAZINE (Apresoline) tab 25 mg, 25 mg, Oral, BID    HYDROmorphone (Dilaudid) 1 MG/ML injection 0.2 mg, 0.2 mg, Intravenous, Q2H PRN **OR** HYDROmorphone (Dilaudid) 1 MG/ML injection 0.4 mg, 0.4 mg, Intravenous, Q2H PRN **OR** HYDROmorphone (Dilaudid) 1 MG/ML injection 0.8 mg, 0.8 mg, Intravenous, Q2H PRN    sevelamer carbonate (Renvela) tab 2,400 mg, 2,400 mg, Oral, TID CC    NIFEdipine ER (Procardia-XL) 24 hr tab 60 mg, 60 mg, Oral, BID    losartan (Cozaar) tab 100 mg, 100 mg, Oral, Daily    sodium chloride 0.9 % IV bolus 100 mL, 100 mL, Intravenous, Q30 Min PRN **AND** albumin human (Albumin) 25% injection 25 g, 25 g, Intravenous, PRN Dialysis    heparin (Porcine) 1000 UNIT/ML injection 1,500 Units, 1.5 mL, Intracatheter, Once    hydrocortisone (Anusol-HC) 25 MG rectal suppository 25 mg, 25 mg, Rectal, BID    acetaminophen (Tylenol Extra Strength) tab 500 mg, 500 mg, Oral, Q4H PRN    polyethylene glycol (PEG 3350) (Miralax) 17 g oral packet 17 g, 17 g, Oral, Daily PRN    sennosides (Senokot) tab 17.2 mg, 17.2 mg, Oral, Nightly PRN    bisacodyl (Dulcolax) 10 MG rectal suppository 10 mg, 10 mg, Rectal, Daily PRN    melatonin tab 3 mg, 3 mg, Oral, Nightly PRN    ondansetron (Zofran) 4 MG/2ML injection 4 mg, 4 mg, Intravenous, Q6H PRN    prochlorperazine (Compazine) 10 MG/2ML injection 5 mg, 5 mg, Intravenous, Q8H PRN    albuterol (Ventolin HFA) 108 (90 Base) MCG/ACT inhaler 2 puff, 2 puff, Inhalation, Q6H  PRN    ARIPiprazole (Abilify) tab 15 mg, 15 mg, Oral, Daily    buPROPion ER (Wellbutrin XL) 24 hr tab 150 mg, 150 mg, Oral, Daily    carvedilol (Coreg) tab 25 mg, 25 mg, Oral, BID with meals    fenofibrate micronized (Lofibra) cap 134 mg, 134 mg, Oral, Nightly    FLUoxetine (PROzac) cap 40 mg, 40 mg, Oral, Daily    gabapentin (Neurontin) cap 200 mg, 200 mg, Oral, TID    lamoTRIgine (LaMICtal) tab 100 mg, 100 mg, Oral, Daily    memantine (Namenda) tab 5 mg, 5 mg, Oral, BID    tamsulosin (Flomax) cap 0.4 mg, 0.4 mg, Oral, Daily    amphetamine-dextroamphetamine (Adderall) tab 10 mg, 10 mg, Oral, BID AC    HYDROcodone-acetaminophen (Norco) 5-325 MG per tab 1 tablet, 1 tablet, Oral, Q6H PRN       HISTORY:  Past Medical History:    Anxiety state    Arrhythmia    Asthma ([facility])    Attention deficit hyperactivity disorder (ADHD)    Back problem    Bipolar 1 disorder ([facility])    CKD (chronic kidney disease) stage 3, GFR 30-59 ml/min ([facility])    Dr Meeks    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[facility])    Congestive heart disease ([facility])    COPD (chronic obstructive pulmonary disease) ([facility])    Coronary atherosclerosis    Deep vein thrombosis ([facility])    at age 17 R/T cast    Depression    Diabetes ([facility])    Dialysis patient ([facility])    Diverticulosis of large intestine    Essential hypertension    3/21 echo: severe concentric LVH with normal EF and no MR or pHTN    Extrinsic asthma, unspecified    Heart attack (HCC)    2016- angiogram- no intervention    Heart valve disease    mitral valve repair in 1994/    High blood pressure    High cholesterol    History of blood transfusion    History of mitral valve repair    Hyperlipidemia    Low back pain    tight and stiff after sweeping and mopping    LVH (left ventricular hypertrophy)    Migraines    Mixed hyperlipidemia     HDL 38 LDL 97 VLDL 57     Monoclonal gammopathy    IgG kappa     Muscle weakness    MVP (mitral valve prolapse)    Repair 1994 at [place]; echoes as recently as 3/21 show mild  or trivial MR and no stenosis    Neuropathy    Osteoarthritis    hip ,knees    Pneumonia due to organism    Pulmonary embolism (HCC)    Renal disorder    Stroke (HCC)    TIA (transient ischemic attack)    Initial history of left-sided weakness and slurred speech. (+) cocaine. MRI of the brain, CT angiogram of the head and neck, and 2D echo are all unremarkable.     TMJ (dislocation of temporomandibular joint)    Troponin level elevated    Trop 60 60 47 with TIA and no CP: Lexiscan negative with EF 51    Visual impairment      Past Surgical History:   Procedure Laterality Date    Av fistula revision, open Left     Cabg      Colonoscopy N/A 03/26/2023    Procedure: COLONOSCOPY;  Surgeon: Heath Vu MD;  Location:  ENDOSCOPY    Colonoscopy N/A 12/30/2023    Procedure: COLONOSCOPY with cold snare polypectomy and forcep polypectomy;  Surgeon: Ousmane Suarez MD;  Location:  ENDOSCOPY    Colonoscopy & polypectomy  2019    Egd  2019    Duodenitis. Biopsied. EUS for weight loss was negative    Heart surgery      Hernia surgery  08/17/2022    Dr Barnes    Laminectomy,>2 sgmt,lumbar  09/06/2018    L4-L5 Decomp Discectomy ROEM L4-L5    Mitralplasty w cp bypass  1994    [place]: Repair    Repair rotator cuff,chronic Left     torn and had a ruptured bicep    Sinus surgery        Spine surgery procedure unlisted      Valve repair  1994    mitral valve      Family History   Problem Relation Age of Onset    Hypertension Father     Alcohol and Other Disorders Associated Father     Substance Abuse Father         cocaine    Dementia Father     Cancer Father         lung    Diabetes Mother     Cancer Mother         multiple myeloma    Hypertension Mother     Anxiety Maternal Aunt     Depression Maternal Aunt     Anxiety Maternal Aunt     Depression Maternal Aunt     Bipolar Disorder Maternal Aunt     Diabetes Maternal Grandmother     Hypertension Maternal Grandmother     Cancer Maternal Grandfather         stomach cancer     Diabetes Maternal Grandfather     Hypertension Maternal Grandfather     Alcohol and Other Disorders Associated Maternal Grandfather     Hypertension Paternal Grandmother     Hypertension Paternal Grandfather     Cancer Sister         uterine and ovarian    Hypertension Sister     Cancer Maternal Uncle         lung    Cancer Paternal Aunt         throat      Social History     Socioeconomic History    Marital status:    Tobacco Use    Smoking status: Former     Current packs/day: 0.00     Average packs/day: 1 pack/day for 27.0 years (27.0 ttl pk-yrs)     Types: Cigarettes     Start date: 1995     Quit date: 2022     Years since quittin.8     Passive exposure: Never    Smokeless tobacco: Never   Vaping Use    Vaping status: Never Used   Substance and Sexual Activity    Alcohol use: No    Drug use: No     Social Drivers of Health     Financial Resource Strain: Medium Risk (2024)    Received from [facility]    Overall Financial Resource Strain (CARDIA)     Difficulty of Paying Living Expenses: Somewhat hard   Food Insecurity: No Food Insecurity (2024)    Food Insecurity     Food Insecurity: Never true   Transportation Needs: No Transportation Needs (2024)    Transportation Needs     Lack of Transportation: No   Physical Activity: Inactive (2024)    Received from [facility]    Exercise Vital Sign     Days of Exercise per Week: 0 days     Minutes of Exercise per Session: 0 min   Stress: Stress Concern Present (2024)    Received from [facility]    [employer] - Occupational Stress Questionnaire     Feeling of Stress : Rather much   Social Connections: Unknown (2024)    Received from [facility]    Social Connection and Isolation Panel [NHANES]     Frequency of Communication with Friends and Family: More than three times a week     Frequency of Social Gatherings with Friends and Family: More than three times  a week     Attends Restoration Services: Never     Active Member of Clubs or Organizations: No     Attends Club or Organization Meetings: Never     Marital Status: Patient unable to answer   Housing Stability: Low Risk  (12/17/2024)    Housing Stability     Housing Instability: No               REVIEW OF SYSTEMS:   10 point ros reviewed. Pertinent positive per HPI.      EXAM:   /88 (BP Location: Right arm)   Pulse 92   Temp 98 °F (36.7 °C) (Oral)   Resp 18   Ht 6' 2\" (1.88 m)   Wt 240 lb (108.9 kg)   SpO2 95%   BMI 30.81 kg/m²  Body mass index is 30.81 kg/m².    GENERAL: Well developed, well nourished, in no obvious distress.   CV: RRR  PULM: CTAB  ABDOMEN: Bowel sounds normoactive. Soft, no organomegaly or masses appreciated. Nontender.   EXTREMITIES: No peripheral edema appreciated.   NEURO: Alert and Oriented x 3.      LAB/IMAGING RESULTS:   [unfilled]  Lab Results   Component Value Date    WBC 6.2 12/20/2024    HGB 9.3 12/20/2024    HCT 26.6 12/20/2024    .0 12/20/2024    CREATSERUM 8.79 12/20/2024    BUN 42 12/20/2024     12/20/2024    K 3.9 12/20/2024     12/20/2024    CO2 24.0 12/20/2024    GLU 94 12/20/2024    CA 8.9 12/20/2024    ALB 4.0 12/20/2024    PHOS 5.2 12/20/2024         ASSESSMENT AND PLAN:   Hematochezia  Chronic anemia     EGD and colonoscopy reportedly normal August 2024. Normal capsule October 2024. Suspect anemia is 2/2 MGUS and chronic kidney disease and bleeding due to diverticular bleed. Recurrent episode yesterday. Tagged rbc neg. Hgb at baseline 9.3.     - regular diet, discharge home    Chung Alfaro MD           [1]   Allergies  Allergen Reactions    Hydrochlorothiazide RASH and HIVES

## 2024-12-20 NOTE — PLAN OF CARE
Pt A/O x4. VSS. Afebrile. Pt c/o pain throughout day. PRN dilaudid and norco admin per MAR during day w/ some relief. Pt stated pain was on R side of abdomen, MD aware. Pt had 1 bloody BM on shift. MD (hospitalist and GI) notified. Stat hgb ordered and collected, 9.7.NUC-MED blood loss scan ordered and completed. No further evidence of bleeding on shift. H/H q8 hours ordered. Medications admin per MAR. CLD per GI. Fall and safety precautions in place. Call light within reach.

## 2024-12-20 NOTE — PROGRESS NOTES
[facility]   part of [facility]    Nephrology Progress Note    Godwin Fonseca Attending:  Jagdeep Joseph DO       SUBJECTIVE:     No complaints today.  He cancelled HD at his outpatient unit today.    PHYSICAL EXAM:     Vital Signs: /88 (BP Location: Right arm)   Pulse 92   Temp 98 °F (36.7 °C) (Oral)   Resp 18   Ht 188 cm (6' 2\")   Wt 240 lb (108.9 kg)   SpO2 95%   BMI 30.81 kg/m²   Temp (24hrs), Av °F (36.7 °C), Min:97.9 °F (36.6 °C), Max:98.1 °F (36.7 °C)       Intake/Output Summary (Last 24 hours) at 2024 1018  Last data filed at 2024  Gross per 24 hour   Intake 200 ml   Output --   Net 200 ml     Wt Readings from Last 3 Encounters:   24 240 lb (108.9 kg)   24 236 lb 6.4 oz (107.2 kg)   24 236 lb 12.8 oz (107.4 kg)          General: pleasant, well appearing  Skin: no visible rashes  HEENT: NCAT  Cardiac: Regular rate and rhythm, 3/6 systolic murmur  Lungs: CTAB, no wheeze, no rale, no rhonchi  Abdomen: Soft, NTND  Extremities: warm, well perfused, no leg edema  Neurologic/Psych: mentating well, no asterixis  RIJ tunnelled HD catheter  LUE AVF   LABORATORY DATA:     Lab Results   Component Value Date    GLU 94 2024    BUN 42 (H) 2024    BUNCREA 13.0 2022    CREATSERUM 8.79 (H) 2024    ANIONGAP 14 2024    GFR 59 (L) 2018    GFRNAA 30 (L) 2022    GFRAA 35 (L) 2022    CA 8.9 2024    OSMOCALC 298 (H) 2024    ALKPHO 96 2024    AST 27 2024    ALT 13 2024    BILT 0.4 2024    TP 7.6 2024    ALB 4.0 2024    GLOBULIN 3.1 2024     2024    K 3.9 2024     2024    CO2 24.0 2024     Lab Results   Component Value Date    WBC 6.2 2024    RBC 2.85 (L) 2024    HGB 9.3 (L) 2024    HCT 26.6 (L) 2024    .0 2024    MPV 11.5 2012    MCV 93.3 2024    MCH 32.6 2024    MCHC 35.0 2024     RDW 14.6 12/20/2024    NEPRELIM 3.26 12/20/2024    NEPERCENT 52.3 12/20/2024    LYPERCENT 23.5 12/20/2024    MOPERCENT 13.8 12/20/2024    EOPERCENT 9.2 12/20/2024    BAPERCENT 1.0 12/20/2024    NE 3.26 12/20/2024    LYMABS 1.46 12/20/2024    MOABSO 0.86 12/20/2024    EOABSO 0.57 12/20/2024    BAABSO 0.06 12/20/2024     Lab Results   Component Value Date    MALBP 167.00 05/24/2023    CREUR 142.00 05/24/2023    CREUR 142.00 05/24/2023     Lab Results   Component Value Date    COLORUR Light-Yellow 11/17/2024    CLARITY Clear 11/17/2024    SPECGRAVITY 1.013 11/17/2024    GLUUR Normal 11/17/2024    BILUR Negative 11/17/2024    KETUR Negative 11/17/2024    BLOODURINE Negative 11/17/2024    PHURINE 8.5 (H) 11/17/2024    PROUR 100 (A) 11/17/2024    UROBILINOGEN Normal 11/17/2024    NITRITE Negative 11/17/2024    LEUUR Negative 11/17/2024    WBCUR 1-5 11/17/2024    RBCUR 0-2 11/17/2024    EPIUR Few (A) 11/17/2024    BACUR Rare (A) 11/17/2024    CAOXUR Occasional (A) 04/20/2018    HYLUR Present (A) 05/14/2019         IMAGING:     Reviewed.    MEDICATIONS:       Current Facility-Administered Medications   Medication Dose Route Frequency    sodium chloride 0.9 % IV bolus 100 mL  100 mL Intravenous Q30 Min PRN    And    albumin human (Albumin) 25% injection 25 g  25 g Intravenous PRN Dialysis    epoetin sylvia (Epogen, Procrit) 56658 UNIT/ML injection 10,000 Units  10,000 Units Subcutaneous Once    hydrALAZINE (Apresoline) tab 25 mg  25 mg Oral BID    HYDROmorphone (Dilaudid) 1 MG/ML injection 0.2 mg  0.2 mg Intravenous Q2H PRN    Or    HYDROmorphone (Dilaudid) 1 MG/ML injection 0.4 mg  0.4 mg Intravenous Q2H PRN    Or    HYDROmorphone (Dilaudid) 1 MG/ML injection 0.8 mg  0.8 mg Intravenous Q2H PRN    sevelamer carbonate (Renvela) tab 2,400 mg  2,400 mg Oral TID CC    NIFEdipine ER (Procardia-XL) 24 hr tab 60 mg  60 mg Oral BID    losartan (Cozaar) tab 100 mg  100 mg Oral Daily    heparin (Porcine) 1000 UNIT/ML injection 1,500  Units  1.5 mL Intracatheter Once    hydrocortisone (Anusol-HC) 25 MG rectal suppository 25 mg  25 mg Rectal BID    acetaminophen (Tylenol Extra Strength) tab 500 mg  500 mg Oral Q4H PRN    polyethylene glycol (PEG 3350) (Miralax) 17 g oral packet 17 g  17 g Oral Daily PRN    sennosides (Senokot) tab 17.2 mg  17.2 mg Oral Nightly PRN    bisacodyl (Dulcolax) 10 MG rectal suppository 10 mg  10 mg Rectal Daily PRN    melatonin tab 3 mg  3 mg Oral Nightly PRN    ondansetron (Zofran) 4 MG/2ML injection 4 mg  4 mg Intravenous Q6H PRN    prochlorperazine (Compazine) 10 MG/2ML injection 5 mg  5 mg Intravenous Q8H PRN    albuterol (Ventolin HFA) 108 (90 Base) MCG/ACT inhaler 2 puff  2 puff Inhalation Q6H PRN    ARIPiprazole (Abilify) tab 15 mg  15 mg Oral Daily    buPROPion ER (Wellbutrin XL) 24 hr tab 150 mg  150 mg Oral Daily    carvedilol (Coreg) tab 25 mg  25 mg Oral BID with meals    fenofibrate micronized (Lofibra) cap 134 mg  134 mg Oral Nightly    FLUoxetine (PROzac) cap 40 mg  40 mg Oral Daily    gabapentin (Neurontin) cap 200 mg  200 mg Oral TID    lamoTRIgine (LaMICtal) tab 100 mg  100 mg Oral Daily    memantine (Namenda) tab 5 mg  5 mg Oral BID    tamsulosin (Flomax) cap 0.4 mg  0.4 mg Oral Daily    amphetamine-dextroamphetamine (Adderall) tab 10 mg  10 mg Oral BID AC    HYDROcodone-acetaminophen (Norco) 5-325 MG per tab 1 tablet  1 tablet Oral Q6H PRN       ASSESSMENT/PLAN:     43 yo M with history of ESRD on iHD MWF with LUE AVF and RIJ tunnelled HD catheter, HTN, severe MR s/p MVR x2, HFpEF, DVT/PE, TIA, MGUS, bipolar disorder, recurrent GI bleed presented with rectal bleeding x 3-4 days.      ESRD:  -- HD today     Anemia in ESRD:  -- ferrlicit x 1 for borderline low tsat  -- epo with HD today     Hyperphosphatemia:  -- resumed sevelamer 2400 TID with meals  -- low phos diet     HTN:  -- resumed antihypertensives as he was previously taking     LUE AVF with malfunction:  -- L arm precautions  -- outpatient  vascular surgery follow up     Rectal bleeding:  -- per GI    Ok to discharge today after HD from renal perspective.  Discussed with RN.    Addendum:   Patient was not discharged today so nephrology will follow peripherally over the weekend.    Thank you for allowing me to participate in the care of this patient. Please do not hesitate to call with questions or concerns.        Lenka Bond MD  [facility]  [address] [place] [id number]    12/20/2024  10:18 AM

## 2024-12-21 VITALS
WEIGHT: 240 LBS | SYSTOLIC BLOOD PRESSURE: 152 MMHG | OXYGEN SATURATION: 97 % | TEMPERATURE: 99 F | BODY MASS INDEX: 30.8 KG/M2 | DIASTOLIC BLOOD PRESSURE: 86 MMHG | HEART RATE: 91 BPM | HEIGHT: 74 IN | RESPIRATION RATE: 16 BRPM

## 2024-12-21 LAB
ALBUMIN SERPL-MCNC: 4 G/DL (ref 3.2–4.8)
ANION GAP SERPL CALC-SCNC: 8 MMOL/L (ref 0–18)
BASOPHILS # BLD AUTO: 0.06 X10(3) UL (ref 0–0.2)
BASOPHILS NFR BLD AUTO: 1 %
BUN BLD-MCNC: 28 MG/DL (ref 9–23)
CALCIUM BLD-MCNC: 9.2 MG/DL (ref 8.7–10.4)
CHLORIDE SERPL-SCNC: 105 MMOL/L (ref 98–112)
CO2 SERPL-SCNC: 25 MMOL/L (ref 21–32)
CREAT BLD-MCNC: 6.99 MG/DL
EGFRCR SERPLBLD CKD-EPI 2021: 9 ML/MIN/1.73M2 (ref 60–?)
EOSINOPHIL # BLD AUTO: 0.57 X10(3) UL (ref 0–0.7)
EOSINOPHIL NFR BLD AUTO: 9.2 %
ERYTHROCYTE [DISTWIDTH] IN BLOOD BY AUTOMATED COUNT: 15.1 %
GLUCOSE BLD-MCNC: 100 MG/DL (ref 70–99)
HCT VFR BLD AUTO: 28.8 %
HGB BLD-MCNC: 9.6 G/DL
IMM GRANULOCYTES # BLD AUTO: 0.02 X10(3) UL (ref 0–1)
IMM GRANULOCYTES NFR BLD: 0.3 %
LYMPHOCYTES # BLD AUTO: 1.3 X10(3) UL (ref 1–4)
LYMPHOCYTES NFR BLD AUTO: 21 %
MCH RBC QN AUTO: 31.2 PG (ref 26–34)
MCHC RBC AUTO-ENTMCNC: 33.3 G/DL (ref 31–37)
MCV RBC AUTO: 93.5 FL
MONOCYTES # BLD AUTO: 0.79 X10(3) UL (ref 0.1–1)
MONOCYTES NFR BLD AUTO: 12.8 %
NEUTROPHILS # BLD AUTO: 3.44 X10 (3) UL (ref 1.5–7.7)
NEUTROPHILS # BLD AUTO: 3.44 X10(3) UL (ref 1.5–7.7)
NEUTROPHILS NFR BLD AUTO: 55.7 %
OSMOLALITY SERPL CALC.SUM OF ELEC: 292 MOSM/KG (ref 275–295)
PHOSPHATE SERPL-MCNC: 5.2 MG/DL (ref 2.4–5.1)
PLATELET # BLD AUTO: 223 10(3)UL (ref 150–450)
POTASSIUM SERPL-SCNC: 4.3 MMOL/L (ref 3.5–5.1)
RBC # BLD AUTO: 3.08 X10(6)UL
SODIUM SERPL-SCNC: 138 MMOL/L (ref 136–145)
WBC # BLD AUTO: 6.2 X10(3) UL (ref 4–11)

## 2024-12-21 PROCEDURE — 80069 RENAL FUNCTION PANEL: CPT | Performed by: INTERNAL MEDICINE

## 2024-12-21 PROCEDURE — 85025 COMPLETE CBC W/AUTO DIFF WBC: CPT | Performed by: INTERNAL MEDICINE

## 2024-12-21 NOTE — PLAN OF CARE
Pt A/O x4. VSS. Afebrile. Pt c/o pain throughout day, PRN pain medication admin per MAR w/ relief. Medications admin per MAR. Pt cleared for DC by all consults-per neph, okay to DC after dialysis. Pt currently receiving dialysis. No bleeding noted on shift. Fall and safety precautions in place. Call light within reach.

## 2024-12-21 NOTE — PROGRESS NOTES
Pt awake.oriented x 4.  Mild abdominal discomfort.  Abdomen soft, nontender.  Active bowel sounds.  Tolerating diet, denies nausea/vomiting.  Lungs clear.  Cleared for discharge home this morning per hospitalist.  Updated pt re: plan of care and discharge plans.  Verbalizes understanding.  Shceudled meds given per MAR.  Safety measures in place.

## 2024-12-21 NOTE — PROGRESS NOTES
[facility]   part of [facility]    Nephrology Progress Note    Godwin Fonseca Attending:  Jagdeep Jsoeph DO       SUBJECTIVE:     No complaints today. Had abdominal pain yesterday  HD last night stopped after 3hrs 20min as circuit clotted. No complaints    PHYSICAL EXAM:     Vital Signs: /86 (BP Location: Right arm)   Pulse 91   Temp 98.5 °F (36.9 °C) (Oral)   Resp 16   Ht 6' 2\" (1.88 m)   Wt 240 lb (108.9 kg)   SpO2 97%   BMI 30.81 kg/m²   Temp (24hrs), Av.2 °F (36.8 °C), Min:97.6 °F (36.4 °C), Max:98.9 °F (37.2 °C)       Intake/Output Summary (Last 24 hours) at 2024 0906  Last data filed at 2024 2000  Gross per 24 hour   Intake 150 ml   Output 1900 ml   Net -1750 ml     Wt Readings from Last 3 Encounters:   24 240 lb (108.9 kg)   24 236 lb 6.4 oz (107.2 kg)   24 236 lb 12.8 oz (107.4 kg)          General: pleasant, well appearing  Skin: no visible rashes  HEENT: NCAT  Cardiac: Regular rate and rhythm, 3/6 systolic murmur  Lungs: CTAB, no wheeze, no rale, no rhonchi  Abdomen: Soft, NTND  Extremities: warm, well perfused, no leg edema  Neurologic/Psych: mentating well, no asterixis  RIJ tunnelled HD catheter  LUE AVF   LABORATORY DATA:     Lab Results   Component Value Date     (H) 2024    BUN 28 (H) 2024    BUNCREA 13.0 2022    CREATSERUM 6.99 (H) 2024    ANIONGAP 8 2024    GFR 59 (L) 2018    GFRNAA 30 (L) 2022    GFRAA 35 (L) 2022    CA 9.2 2024    OSMOCALC 292 2024    ALKPHO 96 2024    AST 27 2024    ALT 13 2024    BILT 0.4 2024    TP 7.6 2024    ALB 4.0 2024    GLOBULIN 3.1 2024     2024    K 4.3 2024     2024    CO2 25.0 2024     Lab Results   Component Value Date    WBC 6.2 2024    RBC 3.08 (L) 2024    HGB 9.6 (L) 2024    HCT 28.8 (L) 2024    .0 2024    MPV 11.5 2012     MCV 93.5 12/21/2024    MCH 31.2 12/21/2024    MCHC 33.3 12/21/2024    RDW 15.1 12/21/2024    NEPRELIM 3.44 12/21/2024    NEPERCENT 55.7 12/21/2024    LYPERCENT 21.0 12/21/2024    MOPERCENT 12.8 12/21/2024    EOPERCENT 9.2 12/21/2024    BAPERCENT 1.0 12/21/2024    NE 3.44 12/21/2024    LYMABS 1.30 12/21/2024    MOABSO 0.79 12/21/2024    EOABSO 0.57 12/21/2024    BAABSO 0.06 12/21/2024     Lab Results   Component Value Date    MALBP 167.00 05/24/2023    CREUR 142.00 05/24/2023    CREUR 142.00 05/24/2023     Lab Results   Component Value Date    COLORUR Light-Yellow 11/17/2024    CLARITY Clear 11/17/2024    SPECGRAVITY 1.013 11/17/2024    GLUUR Normal 11/17/2024    BILUR Negative 11/17/2024    KETUR Negative 11/17/2024    BLOODURINE Negative 11/17/2024    PHURINE 8.5 (H) 11/17/2024    PROUR 100 (A) 11/17/2024    UROBILINOGEN Normal 11/17/2024    NITRITE Negative 11/17/2024    LEUUR Negative 11/17/2024    WBCUR 1-5 11/17/2024    RBCUR 0-2 11/17/2024    EPIUR Few (A) 11/17/2024    BACUR Rare (A) 11/17/2024    CAOXUR Occasional (A) 04/20/2018    HYLUR Present (A) 05/14/2019         IMAGING:     Reviewed.    MEDICATIONS:       Current Facility-Administered Medications   Medication Dose Route Frequency    sodium chloride 0.9 % IV bolus 100 mL  100 mL Intravenous Q30 Min PRN    And    albumin human (Albumin) 25% injection 25 g  25 g Intravenous PRN Dialysis    hydrALAZINE (Apresoline) tab 25 mg  25 mg Oral BID    sevelamer carbonate (Renvela) tab 2,400 mg  2,400 mg Oral TID CC    NIFEdipine ER (Procardia-XL) 24 hr tab 60 mg  60 mg Oral BID    losartan (Cozaar) tab 100 mg  100 mg Oral Daily    hydrocortisone (Anusol-HC) 25 MG rectal suppository 25 mg  25 mg Rectal BID    acetaminophen (Tylenol Extra Strength) tab 500 mg  500 mg Oral Q4H PRN    polyethylene glycol (PEG 3350) (Miralax) 17 g oral packet 17 g  17 g Oral Daily PRN    sennosides (Senokot) tab 17.2 mg  17.2 mg Oral Nightly PRN    bisacodyl (Dulcolax) 10 MG rectal  suppository 10 mg  10 mg Rectal Daily PRN    melatonin tab 3 mg  3 mg Oral Nightly PRN    ondansetron (Zofran) 4 MG/2ML injection 4 mg  4 mg Intravenous Q6H PRN    prochlorperazine (Compazine) 10 MG/2ML injection 5 mg  5 mg Intravenous Q8H PRN    albuterol (Ventolin HFA) 108 (90 Base) MCG/ACT inhaler 2 puff  2 puff Inhalation Q6H PRN    ARIPiprazole (Abilify) tab 15 mg  15 mg Oral Daily    buPROPion ER (Wellbutrin XL) 24 hr tab 150 mg  150 mg Oral Daily    carvedilol (Coreg) tab 25 mg  25 mg Oral BID with meals    fenofibrate micronized (Lofibra) cap 134 mg  134 mg Oral Nightly    FLUoxetine (PROzac) cap 40 mg  40 mg Oral Daily    gabapentin (Neurontin) cap 200 mg  200 mg Oral TID    lamoTRIgine (LaMICtal) tab 100 mg  100 mg Oral Daily    memantine (Namenda) tab 5 mg  5 mg Oral BID    tamsulosin (Flomax) cap 0.4 mg  0.4 mg Oral Daily    amphetamine-dextroamphetamine (Adderall) tab 10 mg  10 mg Oral BID AC    HYDROcodone-acetaminophen (Norco) 5-325 MG per tab 1 tablet  1 tablet Oral Q6H PRN       ASSESSMENT/PLAN:     45 yo M with history of ESRD on iHD MWF with LUE AVF and RIJ tunnelled HD catheter, HTN, severe MR s/p MVR x2, HFpEF, DVT/PE, TIA, MGUS, bipolar disorder, recurrent GI bleed presented with rectal bleeding x 3-4 days.      ESRD:  -- HD MWF     Anemia in ESRD:  -- ferrlicit x 1 for borderline low tsat  -- epo with HD      Hyperphosphatemia:  -- resumed sevelamer 2400 TID with meals  -- low phos diet     HTN:  -- resumed antihypertensives as he was previously taking     LUE AVF with malfunction:  -- L arm precautions  -- outpatient vascular surgery follow up     Rectal bleeding:  -- per GI    Ok to discharge today after HD from renal perspective.  If pt not discharged today nephrology will follow peripherally over rest of the weekend.    Thank you for allowing me to participate in the care of this patient. Please do not hesitate to call with questions or concerns.      Samaria Nava MD  [facility]  Nephrology

## 2024-12-21 NOTE — PROGRESS NOTES
Dialysis completed.  1900 removed.  Continues to alternate dilaudid and norco for abdominal pain.  Using warm packs for cramping.  When asked about his pain he stated with was his typical diverticulosis pain.  Vital signs stable.  Afebrile.  No bowel movements this shift.

## 2024-12-21 NOTE — PROGRESS NOTES
NURSING DISCHARGE NOTE    Discharged Home via Wheelchair.  Accompanied by Support staff  Belongings Taken by patient/family.  Discussed discharge instructions with pt. Verbalizes understanding.  Pt stated he will call [employer] for transportation home.  Printed AVS given .

## 2024-12-25 ENCOUNTER — HOSPITAL ENCOUNTER (INPATIENT)
Facility: HOSPITAL | Age: 44
LOS: 1 days | Discharge: HOME HEALTH | End: 2024-12-26
Attending: EMERGENCY MEDICINE | Admitting: HOSPITALIST
Payer: MEDICARE

## 2024-12-25 ENCOUNTER — APPOINTMENT (OUTPATIENT)
Dept: CT IMAGING | Age: 44
End: 2024-12-25
Attending: EMERGENCY MEDICINE
Payer: MEDICARE

## 2024-12-25 ENCOUNTER — HOSPITAL ENCOUNTER (INPATIENT)
Facility: HOSPITAL | Age: 44
LOS: 1 days | Discharge: HOME | End: 2024-12-26
Attending: EMERGENCY MEDICINE | Admitting: HOSPITALIST
Payer: MEDICARE

## 2024-12-25 DIAGNOSIS — K62.5 RECTAL BLEEDING: Primary | ICD-10-CM

## 2024-12-25 DIAGNOSIS — K52.9 ACUTE COLITIS: ICD-10-CM

## 2024-12-25 DIAGNOSIS — D64.9 ANEMIA, UNSPECIFIED TYPE: ICD-10-CM

## 2024-12-25 DIAGNOSIS — Z99.2 ESRD ON HEMODIALYSIS (HCC): ICD-10-CM

## 2024-12-25 DIAGNOSIS — N18.6 ESRD ON HEMODIALYSIS (HCC): ICD-10-CM

## 2024-12-25 LAB
ALBUMIN SERPL-MCNC: 4.2 G/DL (ref 3.2–4.8)
ALBUMIN/GLOB SERPL: 1.5 {RATIO} (ref 1–2)
ALP LIVER SERPL-CCNC: 81 U/L
ALT SERPL-CCNC: 9 U/L
ANION GAP SERPL CALC-SCNC: 12 MMOL/L (ref 0–18)
ANTIBODY SCREEN: NEGATIVE
AST SERPL-CCNC: 25 U/L (ref ?–34)
BASOPHILS # BLD AUTO: 0.06 X10(3) UL (ref 0–0.2)
BASOPHILS NFR BLD AUTO: 1 %
BILIRUB SERPL-MCNC: 0.5 MG/DL (ref 0.3–1.2)
BUN BLD-MCNC: 90 MG/DL (ref 9–23)
CALCIUM BLD-MCNC: 8.6 MG/DL (ref 8.7–10.4)
CHLORIDE SERPL-SCNC: 108 MMOL/L (ref 98–112)
CO2 SERPL-SCNC: 20 MMOL/L (ref 21–32)
CREAT BLD-MCNC: 11.53 MG/DL
EGFRCR SERPLBLD CKD-EPI 2021: 5 ML/MIN/1.73M2 (ref 60–?)
EOSINOPHIL # BLD AUTO: 0.43 X10(3) UL (ref 0–0.7)
EOSINOPHIL NFR BLD AUTO: 7 %
ERYTHROCYTE [DISTWIDTH] IN BLOOD BY AUTOMATED COUNT: 15.9 %
GLOBULIN PLAS-MCNC: 2.8 G/DL (ref 2–3.5)
GLUCOSE BLD-MCNC: 94 MG/DL (ref 70–99)
HCT VFR BLD AUTO: 25.2 %
HGB BLD-MCNC: 8.7 G/DL
IMM GRANULOCYTES # BLD AUTO: 0.02 X10(3) UL (ref 0–1)
IMM GRANULOCYTES NFR BLD: 0.3 %
INR BLD: 1.11 (ref 0.8–1.2)
LIPASE SERPL-CCNC: 57 U/L (ref 12–53)
LYMPHOCYTES # BLD AUTO: 1.12 X10(3) UL (ref 1–4)
LYMPHOCYTES NFR BLD AUTO: 18.3 %
MCH RBC QN AUTO: 32.7 PG (ref 26–34)
MCHC RBC AUTO-ENTMCNC: 34.5 G/DL (ref 31–37)
MCV RBC AUTO: 94.7 FL
MONOCYTES # BLD AUTO: 0.67 X10(3) UL (ref 0.1–1)
MONOCYTES NFR BLD AUTO: 10.9 %
NEUTROPHILS # BLD AUTO: 3.82 X10 (3) UL (ref 1.5–7.7)
NEUTROPHILS # BLD AUTO: 3.82 X10(3) UL (ref 1.5–7.7)
NEUTROPHILS NFR BLD AUTO: 62.5 %
OSMOLALITY SERPL CALC.SUM OF ELEC: 317 MOSM/KG (ref 275–295)
PLATELET # BLD AUTO: 209 10(3)UL (ref 150–450)
POTASSIUM SERPL-SCNC: 4.6 MMOL/L (ref 3.5–5.1)
PROT SERPL-MCNC: 7 G/DL (ref 5.7–8.2)
PROTHROMBIN TIME: 14.1 SECONDS (ref 11.6–14.8)
RBC # BLD AUTO: 2.66 X10(6)UL
RH BLOOD TYPE: POSITIVE
SODIUM SERPL-SCNC: 140 MMOL/L (ref 136–145)
WBC # BLD AUTO: 6.1 X10(3) UL (ref 4–11)

## 2024-12-25 PROCEDURE — 99285 EMERGENCY DEPT VISIT HI MDM: CPT

## 2024-12-25 PROCEDURE — 85025 COMPLETE CBC W/AUTO DIFF WBC: CPT | Performed by: EMERGENCY MEDICINE

## 2024-12-25 PROCEDURE — 86900 BLOOD TYPING SEROLOGIC ABO: CPT | Performed by: EMERGENCY MEDICINE

## 2024-12-25 PROCEDURE — 80053 COMPREHEN METABOLIC PANEL: CPT | Performed by: EMERGENCY MEDICINE

## 2024-12-25 PROCEDURE — 85610 PROTHROMBIN TIME: CPT | Performed by: EMERGENCY MEDICINE

## 2024-12-25 PROCEDURE — 96374 THER/PROPH/DIAG INJ IV PUSH: CPT

## 2024-12-25 PROCEDURE — 86850 RBC ANTIBODY SCREEN: CPT | Performed by: EMERGENCY MEDICINE

## 2024-12-25 PROCEDURE — 74177 CT ABD & PELVIS W/CONTRAST: CPT | Performed by: EMERGENCY MEDICINE

## 2024-12-25 PROCEDURE — 83690 ASSAY OF LIPASE: CPT | Performed by: EMERGENCY MEDICINE

## 2024-12-25 PROCEDURE — 86901 BLOOD TYPING SEROLOGIC RH(D): CPT | Performed by: EMERGENCY MEDICINE

## 2024-12-25 RX ORDER — GABAPENTIN 100 MG/1
200 CAPSULE ORAL 3 TIMES DAILY
Status: DISCONTINUED | OUTPATIENT
Start: 2024-12-25 | End: 2024-12-26

## 2024-12-25 RX ORDER — FLUTICASONE PROPIONATE 50 MCG
1 SPRAY, SUSPENSION (ML) NASAL 2 TIMES DAILY PRN
Status: DISCONTINUED | OUTPATIENT
Start: 2024-12-25 | End: 2024-12-26

## 2024-12-25 RX ORDER — HYDRALAZINE HYDROCHLORIDE 20 MG/ML
10 INJECTION INTRAMUSCULAR; INTRAVENOUS EVERY 4 HOURS PRN
Status: DISCONTINUED | OUTPATIENT
Start: 2024-12-25 | End: 2024-12-26

## 2024-12-25 RX ORDER — ARIPIPRAZOLE 5 MG/1
15 TABLET ORAL DAILY
Status: DISCONTINUED | OUTPATIENT
Start: 2024-12-26 | End: 2024-12-26

## 2024-12-25 RX ORDER — HYDRALAZINE HYDROCHLORIDE 25 MG/1
25 TABLET, FILM COATED ORAL 2 TIMES DAILY
Status: DISCONTINUED | OUTPATIENT
Start: 2024-12-25 | End: 2024-12-26

## 2024-12-25 RX ORDER — LAMOTRIGINE 100 MG/1
100 TABLET ORAL DAILY
Status: DISCONTINUED | OUTPATIENT
Start: 2024-12-26 | End: 2024-12-26

## 2024-12-25 RX ORDER — LOSARTAN POTASSIUM 100 MG/1
100 TABLET ORAL DAILY
Status: DISCONTINUED | OUTPATIENT
Start: 2024-12-26 | End: 2024-12-26

## 2024-12-25 RX ORDER — HYDROMORPHONE HYDROCHLORIDE 1 MG/ML
0.5 INJECTION, SOLUTION INTRAMUSCULAR; INTRAVENOUS; SUBCUTANEOUS ONCE
Status: COMPLETED | OUTPATIENT
Start: 2024-12-25 | End: 2024-12-25

## 2024-12-25 RX ORDER — BUPROPION HYDROCHLORIDE 150 MG/1
150 TABLET ORAL DAILY
Status: DISCONTINUED | OUTPATIENT
Start: 2024-12-26 | End: 2024-12-26

## 2024-12-25 RX ORDER — NIFEDIPINE 30 MG/1
30 TABLET, EXTENDED RELEASE ORAL DAILY
Status: DISCONTINUED | OUTPATIENT
Start: 2024-12-26 | End: 2024-12-26

## 2024-12-25 RX ORDER — MEMANTINE HYDROCHLORIDE 5 MG/1
5 TABLET ORAL 2 TIMES DAILY
Status: DISCONTINUED | OUTPATIENT
Start: 2024-12-25 | End: 2024-12-26

## 2024-12-25 RX ORDER — ALBUTEROL SULFATE 90 UG/1
2 INHALANT RESPIRATORY (INHALATION) EVERY 6 HOURS PRN
Status: DISCONTINUED | OUTPATIENT
Start: 2024-12-25 | End: 2024-12-26

## 2024-12-25 RX ORDER — TAMSULOSIN HYDROCHLORIDE 0.4 MG/1
0.4 CAPSULE ORAL DAILY
Status: DISCONTINUED | OUTPATIENT
Start: 2024-12-26 | End: 2024-12-26

## 2024-12-25 RX ORDER — CARVEDILOL 12.5 MG/1
25 TABLET ORAL 2 TIMES DAILY WITH MEALS
Status: DISCONTINUED | OUTPATIENT
Start: 2024-12-26 | End: 2024-12-26

## 2024-12-25 NOTE — ED INITIAL ASSESSMENT (HPI)
Pt states he was admitted last week on 12/17 and stayed x 4 days for diverticulitis. Pt reports pain is worse today and is still having rectal bleeding.

## 2024-12-26 VITALS
DIASTOLIC BLOOD PRESSURE: 90 MMHG | BODY MASS INDEX: 30.8 KG/M2 | TEMPERATURE: 98 F | WEIGHT: 240 LBS | SYSTOLIC BLOOD PRESSURE: 148 MMHG | HEART RATE: 86 BPM | HEIGHT: 74 IN | RESPIRATION RATE: 18 BRPM | OXYGEN SATURATION: 98 %

## 2024-12-26 LAB
ANION GAP SERPL CALC-SCNC: 14 MMOL/L (ref 0–18)
BASOPHILS # BLD AUTO: 0.08 X10(3) UL (ref 0–0.2)
BASOPHILS NFR BLD AUTO: 1.5 %
BUN BLD-MCNC: 90 MG/DL (ref 9–23)
CALCIUM BLD-MCNC: 8.5 MG/DL (ref 8.7–10.4)
CHLORIDE SERPL-SCNC: 109 MMOL/L (ref 98–112)
CO2 SERPL-SCNC: 18 MMOL/L (ref 21–32)
CREAT BLD-MCNC: 11.06 MG/DL
EGFRCR SERPLBLD CKD-EPI 2021: 5 ML/MIN/1.73M2 (ref 60–?)
EOSINOPHIL # BLD AUTO: 0.48 X10(3) UL (ref 0–0.7)
EOSINOPHIL NFR BLD AUTO: 9.1 %
ERYTHROCYTE [DISTWIDTH] IN BLOOD BY AUTOMATED COUNT: 15.5 %
GLUCOSE BLD-MCNC: 82 MG/DL (ref 70–99)
HCT VFR BLD AUTO: 25 %
HGB BLD-MCNC: 8.3 G/DL
IMM GRANULOCYTES # BLD AUTO: 0.02 X10(3) UL (ref 0–1)
IMM GRANULOCYTES NFR BLD: 0.4 %
LYMPHOCYTES # BLD AUTO: 1.13 X10(3) UL (ref 1–4)
LYMPHOCYTES NFR BLD AUTO: 21.4 %
MAGNESIUM SERPL-MCNC: 2.1 MG/DL (ref 1.6–2.6)
MCH RBC QN AUTO: 31.3 PG (ref 26–34)
MCHC RBC AUTO-ENTMCNC: 33.2 G/DL (ref 31–37)
MCV RBC AUTO: 94.3 FL
MONOCYTES # BLD AUTO: 0.58 X10(3) UL (ref 0.1–1)
MONOCYTES NFR BLD AUTO: 11 %
NEUTROPHILS # BLD AUTO: 3 X10 (3) UL (ref 1.5–7.7)
NEUTROPHILS # BLD AUTO: 3 X10(3) UL (ref 1.5–7.7)
NEUTROPHILS NFR BLD AUTO: 56.6 %
OSMOLALITY SERPL CALC.SUM OF ELEC: 319 MOSM/KG (ref 275–295)
PLATELET # BLD AUTO: 183 10(3)UL (ref 150–450)
POTASSIUM SERPL-SCNC: 4.3 MMOL/L (ref 3.5–5.1)
RBC # BLD AUTO: 2.65 X10(6)UL
SODIUM SERPL-SCNC: 141 MMOL/L (ref 136–145)
WBC # BLD AUTO: 5.3 X10(3) UL (ref 4–11)

## 2024-12-26 PROCEDURE — 85025 COMPLETE CBC W/AUTO DIFF WBC: CPT

## 2024-12-26 PROCEDURE — 83735 ASSAY OF MAGNESIUM: CPT

## 2024-12-26 PROCEDURE — 80048 BASIC METABOLIC PNL TOTAL CA: CPT

## 2024-12-26 RX ORDER — ALBUMIN (HUMAN) 12.5 G/50ML
25 SOLUTION INTRAVENOUS
Status: DISCONTINUED | OUTPATIENT
Start: 2024-12-26 | End: 2024-12-26

## 2024-12-26 RX ORDER — DEXTROAMPHETAMINE SACCHARATE, AMPHETAMINE ASPARTATE, DEXTROAMPHETAMINE SULFATE AND AMPHETAMINE SULFATE 2.5; 2.5; 2.5; 2.5 MG/1; MG/1; MG/1; MG/1
10 TABLET ORAL
Status: DISCONTINUED | OUTPATIENT
Start: 2024-12-26 | End: 2024-12-26

## 2024-12-26 RX ORDER — SEVELAMER CARBONATE 800 MG/1
2400 TABLET, FILM COATED ORAL
Status: DISCONTINUED | OUTPATIENT
Start: 2024-12-26 | End: 2024-12-26

## 2024-12-26 RX ORDER — CALCIUM ACETATE 667 MG/1
667 CAPSULE ORAL DAILY
COMMUNITY

## 2024-12-26 RX ORDER — CLONIDINE HYDROCHLORIDE 0.1 MG/1
1 TABLET ORAL NIGHTLY
COMMUNITY
Start: 2024-12-12

## 2024-12-26 RX ORDER — HYDROMORPHONE HYDROCHLORIDE 1 MG/ML
INJECTION, SOLUTION INTRAMUSCULAR; INTRAVENOUS; SUBCUTANEOUS
Status: COMPLETED
Start: 2024-12-26 | End: 2024-12-26

## 2024-12-26 RX ORDER — FENOFIBRATE 134 MG/1
134 CAPSULE ORAL NIGHTLY
Status: DISCONTINUED | OUTPATIENT
Start: 2024-12-26 | End: 2024-12-26

## 2024-12-26 NOTE — CM/SW NOTE
12/26/24 1100   CM/SW Referral Data   Referral Source Social Work (self-referral)   Reason for Referral Discharge planning   Informant Patient   Patient Info   Patient's Home Environment House   Patient lives with Parent(s)   Discharge Needs   Anticipated D/C needs Home health care;Outpatient dialysis     SW noted that patient admitted from home with Rectal bleeding. SW met with patient at bedside to discuss DC planning. He confirmed that he lives with his mother and he is a patient at [facility] for [facility] HD. He reported that [facility] has been attempting to meet with him but his schedule hasn't allowed it. He would like this resumed at [facility] and he denied any other needs.     SW sent updated referral to [facility] in [place] with resume of care orders.     SW will continue to follow for plan of care changes and remain available for any additional DC needs or concerns.     Maria Ines Ricks MSW, LSW  Discharge Planner   [id number]

## 2024-12-26 NOTE — CONSULTS
[facility] - Nephrology  Inpatient Consultation    Godwin Fonseca Patient Status:  Inpatient    1980 MRN [id number]   Formerly [facility] 4NW-A Attending Velma Goodman, *   Hosp Day # 1 PCP Adrian Small MD     Reason for consult: ESRD on HD  Date of consultation: 2024     HISTORY OF PRESENT ILLNESS:     Godwin Fonseca is a 44 year old male with a medical history notable for ESRD on HD MWF who presents with rectal bleeding. Similar hospitalization in the past. GI following. No missed dialysis sessions recently. Nephrology consulted for further management of dialysis.     REVIEW OF SYSTEMS:     ROS as above, otherwise negative    HISTORY:     Past Medical History:    Anxiety state    Arrhythmia    Asthma (HCC)    Attention deficit hyperactivity disorder (ADHD)    Back problem    Bipolar 1 disorder (HCC)    CKD (chronic kidney disease) stage 3, GFR 30-59 ml/min ([facility])    Dr Meeks    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HCC)    Congestive heart disease (HCC)    COPD (chronic obstructive pulmonary disease) ([facility])    Coronary atherosclerosis    Deep vein thrombosis ([facility])    at age 17 R/T cast    Depression    Diabetes (HCC)    Dialysis patient (HCC)    Diverticulosis of large intestine    Essential hypertension    3/21 echo: severe concentric LVH with normal EF and no MR or pHTN    Extrinsic asthma, unspecified    Heart attack (HCC)    - angiogram- no intervention    Heart valve disease    mitral valve repair in /    High blood pressure    High cholesterol    History of blood transfusion    History of mitral valve repair    Hyperlipidemia    Low back pain    tight and stiff after sweeping and mopping    LVH (left ventricular hypertrophy)    Migraines    Mixed hyperlipidemia     HDL 38 LDL 97 VLDL 57     Monoclonal gammopathy    IgG kappa     Muscle weakness    MVP (mitral valve prolapse)    Repair  at [place]; echoes as recently as 3/21 show mild or trivial MR and no  stenosis    Neuropathy    Osteoarthritis    hip ,knees    Pneumonia due to organism    Pulmonary embolism (HCC)    Renal disorder    Stroke (HCC)    TIA (transient ischemic attack)    Initial history of left-sided weakness and slurred speech. (+) cocaine. MRI of the brain, CT angiogram of the head and neck, and 2D echo are all unremarkable.     TMJ (dislocation of temporomandibular joint)    Troponin level elevated    Trop 60 60 47 with TIA and no CP: Lexiscan negative with EF 51    Visual impairment     Past Surgical History:   Procedure Laterality Date    Av fistula revision, open Left     Cabg      Colonoscopy N/A 03/26/2023    Procedure: COLONOSCOPY;  Surgeon: Heath Vu MD;  Location:  ENDOSCOPY    Colonoscopy N/A 12/30/2023    Procedure: COLONOSCOPY with cold snare polypectomy and forcep polypectomy;  Surgeon: Ousmane Suarez MD;  Location:  ENDOSCOPY    Colonoscopy & polypectomy  2019    Egd  2019    Duodenitis. Biopsied. EUS for weight loss was negative    Heart surgery      Hernia surgery  08/17/2022    Dr Barnes    Laminectomy,>2 sgmt,lumbar  09/06/2018    L4-L5 Decomp Discectomy ROEM L4-L5    Mitralplasty w cp bypass  1994    [place]: Repair    Repair rotator cuff,chronic Left     torn and had a ruptured bicep    Sinus surgery        Spine surgery procedure unlisted      Valve repair  1994    mitral valve     Family History   Problem Relation Age of Onset    Hypertension Father     Alcohol and Other Disorders Associated Father     Substance Abuse Father         cocaine    Dementia Father     Cancer Father         lung    Diabetes Mother     Cancer Mother         multiple myeloma    Hypertension Mother     Anxiety Maternal Aunt     Depression Maternal Aunt     Anxiety Maternal Aunt     Depression Maternal Aunt     Bipolar Disorder Maternal Aunt     Diabetes Maternal Grandmother     Hypertension Maternal Grandmother     Cancer Maternal Grandfather         stomach cancer    Diabetes Maternal  Grandfather     Hypertension Maternal Grandfather     Alcohol and Other Disorders Associated Maternal Grandfather     Hypertension Paternal Grandmother     Hypertension Paternal Grandfather     Cancer Sister         uterine and ovarian    Hypertension Sister     Cancer Maternal Uncle         lung    Cancer Paternal Aunt         throat      reports that he quit smoking about 2 years ago. His smoking use included cigarettes. He started smoking about 29 years ago. He has a 27 pack-year smoking history. He has never been exposed to tobacco smoke. He has never used smokeless tobacco. He reports that he does not drink alcohol and does not use drugs.  Allergies[1]    MEDICATIONS:       Current Facility-Administered Medications:     fenofibrate micronized (Lofibra) cap 134 mg, 134 mg, Oral, Nightly    sevelamer carbonate (Renvela) tab 2,400 mg, 2,400 mg, Oral, TID CC    amphetamine-dextroamphetamine (Adderall) tab 10 mg, 10 mg, Oral, BID AC    albuterol (Ventolin HFA) 108 (90 Base) MCG/ACT inhaler 2 puff, 2 puff, Inhalation, Q6H PRN    ARIPiprazole (Abilify) tab 15 mg, 15 mg, Oral, Daily    buPROPion ER (Wellbutrin XL) 24 hr tab 150 mg, 150 mg, Oral, Daily    carvedilol (Coreg) tab 25 mg, 25 mg, Oral, BID with meals    FLUoxetine (PROzac) cap 40 mg, 40 mg, Oral, Daily    fluticasone propionate (Flonase) 50 MCG/ACT nasal suspension 1 spray, 1 spray, Each Nare, BID PRN    gabapentin (Neurontin) cap 200 mg, 200 mg, Oral, TID    hydrALAZINE (Apresoline) tab 25 mg, 25 mg, Oral, BID    lamoTRIgine (LaMICtal) tab 100 mg, 100 mg, Oral, Daily    losartan (Cozaar) tab 100 mg, 100 mg, Oral, Daily    memantine (Namenda) tab 5 mg, 5 mg, Oral, BID    NIFEdipine ER (Procardia-XL) 24 hr tab 30 mg, 30 mg, Oral, Daily    tamsulosin (Flomax) cap 0.4 mg, 0.4 mg, Oral, Daily    hydrALAzine (Apresoline) 20 mg/mL injection 10 mg, 10 mg, Intravenous, Q4H PRN    Prescriptions Prior to Admission[2]     PHYSICAL EXAM:     Vital Signs: BP (!) 158/98  (BP Location: Right arm)   Pulse 82   Temp 97.6 °F (36.4 °C) (Oral)   Resp 18   Ht 6' 2\" (1.88 m)   Wt 240 lb (108.9 kg)   SpO2 98%   BMI 30.81 kg/m²   Temp (24hrs), Av.6 °F (36.4 °C), Min:97.6 °F (36.4 °C), Max:97.7 °F (36.5 °C)     No intake or output data in the 24 hours ending 24 1136  Wt Readings from Last 3 Encounters:   24 240 lb (108.9 kg)   24 240 lb (108.9 kg)   24 236 lb 6.4 oz (107.2 kg)       General: no acute distress  HEENT: normocephalic, atraumatic  CV: RRR  Respiratory: no respiratory distress  Abdomen: soft, non-tender  Extremities: no edema bilaterally  Skin: warm, dry  Neuro: awake, alert    LABORATORY DATA:     Lab Results   Component Value Date    GLU 82 2024    BUN 90 (H) 2024    BUNCREA 13.0 2022    CREATSERUM 11.06 (H) 2024    ANIONGAP 14 2024    GFR 59 (L) 2018    GFRNAA 30 (L) 2022    GFRAA 35 (L) 2022    CA 8.5 (L) 2024    OSMOCALC 319 (H) 2024    ALKPHO 81 2024    AST 25 2024    ALT 9 (L) 2024    BILT 0.5 2024    TP 7.0 2024    ALB 4.2 2024    GLOBULIN 2.8 2024     2024    K 4.3 2024     2024    CO2 18.0 (L) 2024     Lab Results   Component Value Date    WBC 5.3 2024    RBC 2.65 (L) 2024    HGB 8.3 (L) 2024    HCT 25.0 (L) 2024    .0 2024    MPV 11.5 2012    MCV 94.3 2024    MCH 31.3 2024    MCHC 33.2 2024    RDW 15.5 2024    NEPRELIM 3.00 2024    NEPERCENT 56.6 2024    LYPERCENT 21.4 2024    MOPERCENT 11.0 2024    EOPERCENT 9.1 2024    BAPERCENT 1.5 2024    NE 3.00 2024    LYMABS 1.13 2024    MOABSO 0.58 2024    EOABSO 0.48 2024    BAABSO 0.08 2024     Lab Results   Component Value Date    MALBP 167.00 2023    CREUR 142.00 2023    CREUR 142.00 2023     Lab Results    Component Value Date    COLORUR Light-Yellow 11/17/2024    CLARITY Clear 11/17/2024    SPECGRAVITY 1.013 11/17/2024    GLUUR Normal 11/17/2024    BILUR Negative 11/17/2024    KETUR Negative 11/17/2024    BLOODURINE Negative 11/17/2024    PHURINE 8.5 (H) 11/17/2024    PROUR 100 (A) 11/17/2024    UROBILINOGEN Normal 11/17/2024    NITRITE Negative 11/17/2024    LEUUR Negative 11/17/2024    WBCUR 1-5 11/17/2024    RBCUR 0-2 11/17/2024    EPIUR Few (A) 11/17/2024    BACUR Rare (A) 11/17/2024    CAOXUR Occasional (A) 04/20/2018    HYLUR Present (A) 05/14/2019       IMAGING:     Reviewed    ASSESSMENT:      # ESRD on HD  -MWF schedule outpatient  -[facility]  -L AVF, R TDC     # HTN/Volume Status  -Home medications: carvedilol, hydralazine, losartan, nifedipine      # Anemia     # Secondary Hyperparathyroidism     PLAN:      -HD per MWF schedule - HD tomorrow per outpatient schedule.  -UF with HD as tolerated  -Continue home BP medications   -Defer OWEN in setting of high BPs  -Continue sevelamer 800 TID  -Avoid nephrotoxins and renally dose medications for creatinine clearance  -Monitor intake and output daily  -Daily weights         Okay for discharge from nephrology perspective. Outpatient dialysis scheduled tomorrow.     Thank you for allowing us to participate in the care of this patient.         DO [facility] - Nephrology               [1]   Allergies  Allergen Reactions    Hydrochlorothiazide RASH and HIVES   [2]   Medications Prior to Admission   Medication Sig Dispense Refill Last Dose/Taking    RENVELA 800 MG Oral Tab Take 3 tablets (2,400 mg total) by mouth 3 (three) times daily with meals.   12/25/2024 Morning    hydrALAZINE 25 MG Oral Tab Take 1 tablet (25 mg total) by mouth 2 (two) times daily.   12/25/2024 Morning    losartan 100 MG Oral Tab Take 1 tablet (100 mg total) by mouth daily. 30 tablet 0 12/25/2024 Morning    gabapentin 100 MG Oral Cap Take 2 capsules (200 mg  total) by mouth 3 (three) times daily. 180 capsule 0 2024 Morning    NIFEdipine ER 30 MG Oral Tablet 24 Hr Take 1 tablet (30 mg total) by mouth daily. Hold if systolic blood pressure <130 30 tablet 0 2024 Morning    VYVANSE 60 MG Oral Cap Take 1 capsule (60 mg total) by mouth every morning.   2024 Morning    lamoTRIgine 100 MG Oral Tab Take 1 tablet (100 mg total) by mouth daily.   2024 Morning    memantine 5 MG Oral Tab Take 1 tablet (5 mg total) by mouth 2 (two) times daily.   2024 Morning    albuterol 108 (90 Base) MCG/ACT Inhalation Aero Soln Inhale 2 puffs into the lungs every 6 (six) hours as needed for Wheezing.   Taking As Needed    tamsulosin 0.4 MG Oral Cap Take 1 capsule (0.4 mg total) by mouth daily.   2024 Morning    ARIPiprazole 15 MG Oral Tab Take 1 tablet (15 mg total) by mouth daily.   2024 Morning    buPROPion  MG Oral Tablet 24 Hr Take 1 tablet (150 mg total) by mouth daily.   2024 Morning    FLUoxetine HCl 40 MG Oral Cap Take 1 capsule (40 mg total) by mouth daily. 7 capsule 0 2024 Morning    carvedilol 25 MG Oral Tab Take 1 tablet (25 mg total) by mouth in the morning and 1 tablet (25 mg total) in the evening. Take with meals. 60 tablet 6 2024 Morning    Fenofibrate 134 MG Oral Cap Take 1 capsule by mouth nightly. 90 capsule 1 2024 Bedtime    [] hydrocortisone 25 MG Rectal Suppos Place 1 suppository (25 mg total) rectally 2 (two) times daily. 60 suppository 0     Fluticasone Propionate 50 MCG/ACT Nasal Suspension SPRAY ONCE INTO EACH NOSTRIL BID PRN 15.8 mL 0

## 2024-12-26 NOTE — PLAN OF CARE
Problem: GASTROINTESTINAL - ADULT  Goal: Minimal or absence of nausea and vomiting  Description: INTERVENTIONS:  - Maintain adequate hydration with IV or PO as ordered and tolerated  - Nasogastric tube to low intermittent suction as ordered  - Evaluate effectiveness of ordered antiemetic medications  - Provide nonpharmacologic comfort measures as appropriate  - Advance diet as tolerated, if ordered  - Obtain nutritional consult as needed  - Evaluate fluid balance  Outcome: Progressing  Goal: Maintains or returns to baseline bowel function  Description: INTERVENTIONS:  - Assess bowel function  - Maintain adequate hydration with IV or PO as ordered and tolerated  - Evaluate effectiveness of GI medications  - Encourage mobilization and activity  - Obtain nutritional consult as needed  - Establish a toileting routine/schedule  - Consider collaborating with pharmacy to review patient's medication profile  Outcome: Progressing     Problem: METABOLIC/FLUID AND ELECTROLYTES - ADULT  Goal: Glucose maintained within prescribed range  Description: INTERVENTIONS:  - Monitor Blood Glucose as ordered  - Assess for signs and symptoms of hyperglycemia and hypoglycemia  - Administer ordered medications to maintain glucose within target range  - Assess barriers to adequate nutritional intake and initiate nutrition consult as needed  - Instruct patient on self management of diabetes  Outcome: Progressing  Goal: Electrolytes maintained within normal limits  Description: INTERVENTIONS:  - Monitor labs and rhythm and assess patient for signs and symptoms of electrolyte imbalances  - Administer electrolyte replacement as ordered  - Monitor response to electrolyte replacements, including rhythm and repeat lab results as appropriate  - Fluid restriction as ordered  - Instruct patient on fluid and nutrition restrictions as appropriate  Outcome: Progressing  Goal: Hemodynamic stability and optimal renal function maintained  Description:  INTERVENTIONS:  - Monitor labs and assess for signs and symptoms of volume excess or deficit  - Monitor intake, output and patient weight  - Monitor urine specific gravity, serum osmolarity and serum sodium as indicated or ordered  - Monitor response to interventions for patient's volume status, including labs, urine output, blood pressure (other measures as available)  - Encourage oral intake as appropriate  - Instruct patient on fluid and nutrition restrictions as appropriate  Outcome: Progressing   Alert and orientated x4.  Admitted with rectal bleeding for past 3 days.  Complaining of abdominal pain.  Dialysis patient-last dialysis Tuesday.  Has left IV fistula and right permacath.  Dilaudid 0.5mg given for pain.  On tele.  NPO.

## 2024-12-26 NOTE — CONSULTS
[facility] IP Report of Consultation Gastroenterology    12/26/2024    Godwin Fonseca  male   Heath Vu MD     [id number]  4/12/1980 Primary Care Physician  Adrian Small MD     Reason for Consultation: chronic abd pain, rectal bleeding    HPI: 44M w/ mmp including ESRD on HD, MGUS, CAD, CHF, DM2, COPD, bipolar disorder + depression, among other issues. Very well-known to [facility] from prior cares. Just recently dc'd 12/20 from [facility] after OSH hospital stay, recent OSH EGD + COL + VCE unrevealing for overt single-point GIB source, NM tRBC scan done 12/19 in the midst of rectal bleeding and was negative. See Consult Note from Dr. Alfaro dated 12/18/24 for more recent details.    Returned to [facility] ED w/ episodes of BRBPR + ongoing chronic abd pain, similar to prior episodes. Con CT AP performed w/ nonspecific colitis but o/w largely bland (similar to previous imaging, likely chronic features per prior CT review, and recent COL unrevealing for active colonic inflammatory disease).    States he was having diarrhea + BRBPR prior to ED presentation, none noted since arriving. Remains NPO, wants to eat.    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 (HCC)    Chronic non-seasonal allergic rhinitis    Chronic sinusitis, unspecified location    ADHD (attention deficit hyperactivity disorder), inattentive type    Bipolar depression (HCC)    Essential hypertension    Mild persistent asthma without complication (HCC)    CKD (chronic kidney disease) stage 3, GFR 30-59 ml/min (HCC)    Self-inflicted injury    Bipolar disorder in partial remission, most recent episode unspecified type (HCC)    Family history of prostate cancer    Fatigue, unspecified type    Alkaline phosphatase elevation    Hyperlipidemia, mixed    Low serum HDL    Spinal stenosis of lumbar region with neurogenic claudication    Prolapsed lumbar disc    Status post lumbar  surgery    Cauda equina syndrome (HCC)    Lumbar disc prolapse with compression radiculopathy    Syncope and collapse    Hypokalemia    Orthostatic hypotension    Hypertension, unspecified type    Weight loss    Chest pain, unspecified type    History of mitral valve stenosis    Acute pericarditis, unspecified type ([facility])    Cervical radiculopathy    Elevated blood protein    IgG monoclonal protein disorder    MGUS (monoclonal gammopathy of unknown significance)    Hypertensive urgency    Bipolar 2 disorder ([facility])    Employment problem    Stress    Anxiety disorder, unspecified type    Weakness of left lower extremity    Rectal bleeding    Paresthesia of foot, bilateral    Obesity (BMI 30-39.9)    TIA (transient ischemic attack)    TMJ dysfunction    Inverted papilloma of nasal cavity    Type 2 diabetes mellitus with stage 3b chronic kidney disease, without long-term current use of insulin ([facility])    Acute kidney injury ([facility])    Metabolic acidosis    Hyperglycemia    Chronic kidney disease, unspecified CKD stage    Abdominal distension    SOB (shortness of breath)    Hypertensive crisis    Acute on chronic congestive heart failure, unspecified heart failure type (HCC)    Acute congestive heart failure (HCC)    Acute congestive heart failure, unspecified heart failure type ([facility])    Acute on chronic diastolic congestive heart failure ([facility])    Stage 4 chronic kidney disease (HCC)    ROBERT (acute kidney injury) ([facility])    Abdominal pain, left lower quadrant    Hypertensive emergency    Acute chest pain    Acute on chronic renal insufficiency    Chronic abdominal pain    Nonintractable headache, unspecified chronicity pattern, unspecified headache type    Chronic right shoulder pain    HCAP (healthcare-associated pneumonia)    Acute intractable headache, unspecified headache type    ESRD (end stage renal disease) on dialysis ([facility])    Diarrhea, unspecified type    Primary hypertension    Dyspnea, unspecified type    Abdominal  pain, acute    ESRD on dialysis ([facility])    Fluid overload    ESRD needing dialysis ([facility])    COVID-19 virus infection    Hypotension, unspecified hypotension type    Anemia    Acute renal failure (ARF) ([facility])    Neck pain    Acute nonintractable headache, unspecified headache type    GI bleed    Chronic kidney disease with end stage renal failure on dialysis (HCC)    Lower abdominal pain    ESRD (end stage renal disease) ([facility])    Resistant hypertension    Community acquired pneumonia of right lower lobe of lung    Acute renal failure with acute renal cortical necrosis superimposed on stage 4 chronic kidney disease ([facility])    Acute renal failure, unspecified acute renal failure type ([facility])    Hypervolemia, unspecified hypervolemia type    End stage renal disease on dialysis ([facility])    Acute respiratory failure with hypoxia ([facility])    Stage 5 chronic kidney disease on chronic dialysis ([facility])    Anemia, unspecified type    Hyperkalemia    Hemodialysis catheter infection, initial encounter ([facility])    Type 2 diabetes mellitus with chronic kidney disease on chronic dialysis, without long-term current use of insulin ([facility])    Coronary artery disease involving native coronary artery of native heart without angina pectoris    Pneumonia of right lower lobe due to infectious organism    Expressive aphasia    Uncontrolled hypertension    Bipolar disorder with moderate depression ([facility])    BRBPR (bright red blood per rectum)    Weakness    Nosebleed    ESRD on hemodialysis ([facility])    Acute colitis       PATIENT HISTORY:     Past Medical History:    Anxiety state    Arrhythmia    Asthma ([facility])    Attention deficit hyperactivity disorder (ADHD)    Back problem    Bipolar 1 disorder ([facility])    CKD (chronic kidney disease) stage 3, GFR 30-59 ml/min ([facility])    Dr Meeks    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[facility])    Congestive heart disease ([facility])    COPD (chronic obstructive pulmonary disease) ([facility])    Coronary atherosclerosis    Deep vein thrombosis ([facility])    at  age 17 R/T cast    Depression    Diabetes (HCC)    Dialysis patient (HCC)    Diverticulosis of large intestine    Essential hypertension    3/21 echo: severe concentric LVH with normal EF and no MR or pHTN    Extrinsic asthma, unspecified    Heart attack (HCC)    2016- angiogram- no intervention    Heart valve disease    mitral valve repair in 1994/    High blood pressure    High cholesterol    History of blood transfusion    History of mitral valve repair    Hyperlipidemia    Low back pain    tight and stiff after sweeping and mopping    LVH (left ventricular hypertrophy)    Migraines    Mixed hyperlipidemia     HDL 38 LDL 97 VLDL 57     Monoclonal gammopathy    IgG kappa     Muscle weakness    MVP (mitral valve prolapse)    Repair 1994 at [facility]; echoes as recently as 3/21 show mild or trivial MR and no stenosis    Neuropathy    Osteoarthritis    hip ,knees    Pneumonia due to organism    Pulmonary embolism (HCC)    Renal disorder    Stroke (HCC)    TIA (transient ischemic attack)    Initial history of left-sided weakness and slurred speech. (+) cocaine. MRI of the brain, CT angiogram of the head and neck, and 2D echo are all unremarkable.     TMJ (dislocation of temporomandibular joint)    Troponin level elevated    Trop 60 60 47 with TIA and no CP: Lexiscan negative with EF 51    Visual impairment      Past Surgical History:   Procedure Laterality Date    Av fistula revision, open Left     Cabg      Colonoscopy N/A 03/26/2023    Procedure: COLONOSCOPY;  Surgeon: Heath Vu MD;  Location:  ENDOSCOPY    Colonoscopy N/A 12/30/2023    Procedure: COLONOSCOPY with cold snare polypectomy and forcep polypectomy;  Surgeon: Ousmane Suarez MD;  Location:  ENDOSCOPY    Colonoscopy & polypectomy  2019    Egd  2019    Duodenitis. Biopsied. EUS for weight loss was negative    Heart surgery      Hernia surgery  08/17/2022    Dr Barnes    Laminectomy,>2 sgmt,lumbar  09/06/2018    L4-L5 Decomp Discectomy  MINEM L4-L5    Mitralplasty w cp bypass      Marissa: Repair    Repair rotator cuff,chronic Left     torn and had a ruptured bicep    Sinus surgery        Spine surgery procedure unlisted      Valve repair      mitral valve      Family History   Problem Relation Age of Onset    Hypertension Father     Alcohol and Other Disorders Associated Father     Substance Abuse Father         cocaine    Dementia Father     Cancer Father         lung    Diabetes Mother     Cancer Mother         multiple myeloma    Hypertension Mother     Anxiety Maternal Aunt     Depression Maternal Aunt     Anxiety Maternal Aunt     Depression Maternal Aunt     Bipolar Disorder Maternal Aunt     Diabetes Maternal Grandmother     Hypertension Maternal Grandmother     Cancer Maternal Grandfather         stomach cancer    Diabetes Maternal Grandfather     Hypertension Maternal Grandfather     Alcohol and Other Disorders Associated Maternal Grandfather     Hypertension Paternal Grandmother     Hypertension Paternal Grandfather     Cancer Sister         uterine and ovarian    Hypertension Sister     Cancer Maternal Uncle         lung    Cancer Paternal Aunt         throat      Social History     Socioeconomic History    Marital status:    Tobacco Use    Smoking status: Former     Current packs/day: 0.00     Average packs/day: 1 pack/day for 27.0 years (27.0 ttl pk-yrs)     Types: Cigarettes     Start date: 1995     Quit date: 2022     Years since quittin.8     Passive exposure: Never    Smokeless tobacco: Never   Vaping Use    Vaping status: Never Used   Substance and Sexual Activity    Alcohol use: No    Drug use: No     Social Drivers of Health     Financial Resource Strain: Medium Risk (2024)    [facility] from [facility]    Overall Financial Resource Strain (CARDIA)     Difficulty of Paying Living Expenses: Somewhat hard   Food Insecurity: No Food Insecurity (2024)    Food Insecurity     Food  Insecurity: Never true   Transportation Needs: No Transportation Needs (12/25/2024)    Transportation Needs     Lack of Transportation: No   Physical Activity: Inactive (5/7/2024)    Received from [facility]    Exercise Vital Sign     Days of Exercise per Week: 0 days     Minutes of Exercise per Session: 0 min   Stress: Stress Concern Present (5/7/2024)    Received from [facility]    [employer] [place] [employer] - Occupational Stress Questionnaire     Feeling of Stress : Rather much   Social Connections: Unknown (5/7/2024)    Received from [facility]    Social Connection and Isolation Panel [NHANES]     Frequency of Communication with Friends and Family: More than three times a week     Frequency of Social Gatherings with Friends and Family: More than three times a week     Attends Restorationist Services: Never     Active Member of Clubs or Organizations: No     Attends Club or Organization Meetings: Never     Marital Status: Patient unable to answer   Housing Stability: Low Risk  (12/25/2024)    Housing Stability     Housing Instability: No        Allergies;  Allergies[1]     Medications:    Current Facility-Administered Medications:     albuterol (Ventolin HFA) 108 (90 Base) MCG/ACT inhaler 2 puff, 2 puff, Inhalation, Q6H PRN    ARIPiprazole (Abilify) tab 15 mg, 15 mg, Oral, Daily    buPROPion ER (Wellbutrin XL) 24 hr tab 150 mg, 150 mg, Oral, Daily    carvedilol (Coreg) tab 25 mg, 25 mg, Oral, BID with meals    FLUoxetine (PROzac) cap 40 mg, 40 mg, Oral, Daily    fluticasone propionate (Flonase) 50 MCG/ACT nasal suspension 1 spray, 1 spray, Each Nare, BID PRN    gabapentin (Neurontin) cap 200 mg, 200 mg, Oral, TID    hydrALAZINE (Apresoline) tab 25 mg, 25 mg, Oral, BID    lamoTRIgine (LaMICtal) tab 100 mg, 100 mg, Oral, Daily    losartan (Cozaar) tab 100 mg, 100 mg, Oral, Daily    memantine (Namenda) tab 5 mg, 5 mg, Oral, BID    NIFEdipine ER (Procardia-XL) 24 hr tab 30 mg,  30 mg, Oral, Daily    tamsulosin (Flomax) cap 0.4 mg, 0.4 mg, Oral, Daily    hydrALAzine (Apresoline) 20 mg/mL injection 10 mg, 10 mg, Intravenous, Q4H PRN     REVIEW OF SYSTEMS:   10pt ROS performed and o/w negative, see HPI for pertinent details.      EXAM:   BP (!) 158/98 (BP Location: Right arm)   Pulse 82   Temp 97.6 °F (36.4 °C) (Oral)   Resp 18   Ht 6' 2\" (1.88 m)   Wt 240 lb (108.9 kg)   SpO2 98%   BMI 30.81 kg/m²  Body mass index is 30.81 kg/m².  Gen: cooperative, pleasant, not diaphoretic, nad   HEENT: ncat, normal neck ROM, normal op w/o ulcer/exudate, anicteric, mmm   Resp/Chest: normal respiratory effort, not dyspneic, no incr WOB/cough  CV: no reported palpitations or syncope  Abd: nt, nd, no clinical features suggestive of peritoneal s/s noted  Ext: no c/c/e   Skin: warm, perfused, no jaundice, no pallor  Neuro: aaox3, grossly intact, no asterixis noted, normal speech       LAB/IMAGING RESULTS:     Lab Results   Component Value Date    WBC 5.3 12/26/2024    HGB 8.3 12/26/2024    HCT 25.0 12/26/2024    .0 12/26/2024     [unfilled]  Lab Results   Component Value Date    WBC 5.3 12/26/2024    HGB 8.3 12/26/2024    HCT 25.0 12/26/2024    .0 12/26/2024    CREATSERUM 11.06 12/26/2024    BUN 90 12/26/2024     12/26/2024    K 4.3 12/26/2024     12/26/2024    CO2 18.0 12/26/2024    GLU 82 12/26/2024    CA 8.5 12/26/2024    ALB 4.2 12/25/2024    ALKPHO 81 12/25/2024    BILT 0.5 12/25/2024    TP 7.0 12/25/2024    AST 25 12/25/2024    ALT 9 12/25/2024    INR 1.11 12/25/2024    PTP 14.1 12/25/2024    LIP 57 12/25/2024    MG 2.1 12/26/2024     Con CT AT 12/25/24 (Images reviewed, similar thickening of the L colon on 11/16/24 CT. No acute inflammatory changes surrounding the colon on both the 12/25 and 11/16 images).  LUNG BASE:  Partially imaged scarring in the right lower lung with associated pleural thickening.  Small right pleural effusion.  Mitral valve prosthesis noted.   Elevation of the right diaphragm.  LIVER:  Unremarkable.  BILIARY:  Unremarkable.  SPLEEN:  Unremarkable.  PANCREAS:  Unremarkable.  ADRENALS:  Nonspecific adrenal thickening  KIDNEYS:  Subcentimeter hypodensities in the kidneys measuring up to 8 mm are too small to further characterize and warrant no specific follow-up.  AORTA/VASCULAR:  Unremarkable.  RETROPERITONEUM:  Unremarkable.  BOWEL/MESENTERY:  There is mild diffuse colonic wall thickening that is concerning for nonspecific colitis.  Clinical correlation recommended.  Uncomplicated colonic diverticulosis.  Scattered feces in the colon.  No large or small bowel dilatation.  No  free air or free fluid.  ABDOMINAL WALL:  Unremarkable.  PELVIC ORGANS:  Urinary bladder wall thickening with fatty induration is concerning for cystitis.  Clinical correlation recommended.  Prostate calcification.  Pelvic phleboliths.  LYMPH NODES:  No lymphadenopathy in the abdomen or pelvis.  BONES:  Degenerative changes in the spine  OTHER:  None.                   Impression   CONCLUSION:       1. Findings concerning for nonspecific colitis.  Clinical correlation recommended.     2. Uncomplicated colonic diverticulosis.     3. Urinary bladder wall thickening with fatty induration is concerning for cystitis.  Clinical correlation recommended.       4. Small right pleural effusion.       ASSESSMENT AND PLAN:   #Chronic multifactorial abdominal pain  #Abnormal CT of abdomen (likely chronic changes per review of prior imaging)  #Extensive colonic diverticulosis without overt features suggestive of active diverticulitis  #Complicated hemorrhoids, likely recurrent hemorrhoidal bleeding d/t trauma    Ordered stool studies + FCP in case diarrhea recurs this admission. Diarrhea + BRBPR already resolved per pt though.  Reviewed CT imaging from yesterday and from 11/16/24, largely similar w/o any new acute inflammatory surrounding changes. Suspect chronic changes that would not directly  contribute to acute abd pain in a pt w/ known intractable multifactorial chronic abd pain w/ significant functional component.  No plans for endoscopic evaluation at this time, may resume PO intake (Renal Diet ADAT).  Could potentially consider renally-dosed trial of Cipro + Flagyl x10d if stool studies positive for Infectious Colitis or sxs more suggestive of acute diverticulitis.  No immediate indication for repeat endoscopic evaluation given extensive repeated endoscopic + imaging evaluations over the last 2yrs, including recent OSH EGD + COL and negative VCE w/ [facility], along w/ negative tRBC scan 12/19/24.  Suspect chronic recurrent hemorrhoidal rather than diverticular bleeding. Manage supportively, reviewed gentle perianal cares + fiber supplementation + Bidet/washing methods again w/ pt.  Likely poor surgical candidate given multitude of other chronic comorbidities, supportive care advised.   May need to pursue Surgery consultation through academic center given multiple comorbidities, suspect high-risk for recovery/complications if hemorrhoidal or colonic resection attempted. Discussed today.  Question raised re: chronic cystitis/prostatitis contributing. Advised pt to d/w PCP + Renal + Urology further.  Will sign off for now, call if questions arise.  Discussed w/ pt + RN this morning.  OK to dc from a GI standpoint whenever appropriate.    The patient indicates understanding of these issues and agrees to the plan.      A total of 60 minutes was spent in direct patient care + assessment, chart review, and care coordination today.    Heath Vu MD  Department of Gastroenterology  [facility]  12/26/2024  8:13 AM         [1]   Allergies  Allergen Reactions    Hydrochlorothiazide RASH and HIVES

## 2024-12-26 NOTE — PROGRESS NOTES
1007: Fred called for inpatient hemodialysis.     1100: [facility] called to cancel hemodialysis appointment as patient will discharge today. He will go to his scheduled outpatient dialysis appointment tomorrow.

## 2024-12-26 NOTE — H&P
.CC:   Chief Complaint   Patient presents with    Rectal Bleeding        PCP: Adrian Small MD    History of Present Illness: Patient is a 44 year old male with PMH sig for esrd on hd, bipolar who presented with recurrent rectal bleeding and lower abd discomfort. Similar to previous presentation. Pt started taking fiber recently. Denies any constipation, has had soft stools. No sob.       PMH  Past Medical History:    Anxiety state    Arrhythmia    Asthma ([facility])    Attention deficit hyperactivity disorder (ADHD)    Back problem    Bipolar 1 disorder ([facility])    CKD (chronic kidney disease) stage 3, GFR 30-59 ml/min ([facility])    Dr Meeks    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[facility])    Congestive heart disease ([facility])    COPD (chronic obstructive pulmonary disease) ([facility])    Coronary atherosclerosis    Deep vein thrombosis ([facility])    at age 17 R/T cast    Depression    Diabetes ([facility])    Dialysis patient ([facility])    Diverticulosis of large intestine    Essential hypertension    3/21 echo: severe concentric LVH with normal EF and no MR or pHTN    Extrinsic asthma, unspecified    Heart attack ([facility])    2016- angiogram- no intervention    Heart valve disease    mitral valve repair in 1994/    High blood pressure    High cholesterol    History of blood transfusion    History of mitral valve repair    Hyperlipidemia    Low back pain    tight and stiff after sweeping and mopping    LVH (left ventricular hypertrophy)    Migraines    Mixed hyperlipidemia     HDL 38 LDL 97 VLDL 57     Monoclonal gammopathy    IgG kappa     Muscle weakness    MVP (mitral valve prolapse)    Repair 1994 at [place]; echoes as recently as 3/21 show mild or trivial MR and no stenosis    Neuropathy    Osteoarthritis    hip ,knees    Pneumonia due to organism    Pulmonary embolism ([facility])    Renal disorder    Stroke ([facility])    TIA (transient ischemic attack)    Initial history of left-sided weakness and slurred speech. (+) cocaine. MRI of the brain, CT angiogram of  the head and neck, and 2D echo are all unremarkable.     TMJ (dislocation of temporomandibular joint)    Troponin level elevated    Trop 60 60 47 with TIA and no CP: Lexiscan negative with EF 51    Visual impairment        PSH  Past Surgical History:   Procedure Laterality Date    Av fistula revision, open Left     Cabg      Colonoscopy N/A 2023    Procedure: COLONOSCOPY;  Surgeon: Heath Vu MD;  Location:  ENDOSCOPY    Colonoscopy N/A 2023    Procedure: COLONOSCOPY with cold snare polypectomy and forcep polypectomy;  Surgeon: Ousmane Suarez MD;  Location:  ENDOSCOPY    Colonoscopy & polypectomy  2019    Egd  2019    Duodenitis. Biopsied. EUS for weight loss was negative    Heart surgery      Hernia surgery  2022    Dr Barnes    Laminectomy,>2 sgmt,lumbar  2018    L4-L5 Decomp Discectomy ROEM L4-L5    Mitralplasty w cp bypass      [place]: Repair    Repair rotator cuff,chronic Left     torn and had a ruptured bicep    Sinus surgery        Spine surgery procedure unlisted      Valve repair      mitral valve        ALL:  Allergies[1]     Home Medications:  Medications Taking[2]      Soc Hx  Social History     Tobacco Use    Smoking status: Former     Current packs/day: 0.00     Average packs/day: 1 pack/day for 27.0 years (27.0 ttl pk-yrs)     Types: Cigarettes     Start date: 1995     Quit date: 2022     Years since quittin.8     Passive exposure: Never    Smokeless tobacco: Never   Substance Use Topics    Alcohol use: No        Fam Hx  Family History   Problem Relation Age of Onset    Hypertension Father     Alcohol and Other Disorders Associated Father     Substance Abuse Father         cocaine    Dementia Father     Cancer Father         lung    Diabetes Mother     Cancer Mother         multiple myeloma    Hypertension Mother     Anxiety Maternal Aunt     Depression Maternal Aunt     Anxiety Maternal Aunt     Depression Maternal Aunt     Bipolar Disorder  Maternal Aunt     Diabetes Maternal Grandmother     Hypertension Maternal Grandmother     Cancer Maternal Grandfather         stomach cancer    Diabetes Maternal Grandfather     Hypertension Maternal Grandfather     Alcohol and Other Disorders Associated Maternal Grandfather     Hypertension Paternal Grandmother     Hypertension Paternal Grandfather     Cancer Sister         uterine and ovarian    Hypertension Sister     Cancer Maternal Uncle         lung    Cancer Paternal Aunt         throat       Review of Systems  Comprehensive ROS reviewed and negative except for what's stated above.       OBJECTIVE:  BP (!) 158/98 (BP Location: Right arm)   Pulse 82   Temp 97.6 °F (36.4 °C) (Oral)   Resp 18   Ht 6' 2\" (1.88 m)   Wt 240 lb (108.9 kg)   SpO2 98%   BMI 30.81 kg/m²     Gen- NAD, appears stated age  HEENT- NCAT, anicteric sclera, MMM, OP clear  Lymph- no cervical LAD  CV- RRR no murmurs. No PAMELA  Lungs- CTAB, good respiratory effort  Abd- soft, mild ttp lower quadrants  Derm- no rashes  MSK- good muscle strength and tone, no joint swelling  Neuro- A&OX3, no focal deficits      Diagnostic Data:    CBC/Chem  Recent Labs   Lab 12/20/24  0109 12/20/24  0634 12/21/24  0633 12/25/24  1624 12/26/24  0656   WBC  --  6.2 6.2 6.1 5.3   HGB 9.0* 9.3* 9.6* 8.7* 8.3*   MCV  --  93.3 93.5 94.7 94.3   PLT  --  206.0 223.0 209.0 183.0   INR  --   --   --  1.11  --        Recent Labs   Lab 12/20/24  0634 12/21/24  0633 12/25/24  1624 12/26/24  0657    138 140 141   K 3.9 4.3 4.6 4.3    105 108 109   CO2 24.0 25.0 20.0* 18.0*   BUN 42* 28* 90* 90*   CREATSERUM 8.79* 6.99* 11.53* 11.06*   GLU 94 100* 94 82   CA 8.9 9.2 8.6* 8.5*   MG  --   --   --  2.1   PHOS 5.2* 5.2*  --   --        Recent Labs   Lab 12/20/24  0634 12/21/24  0633 12/25/24  1624   ALT  --   --  9*   AST  --   --  25   ALB 4.0 4.0 4.2       No results for input(s): \"TROP\" in the last 168 hours.        Radiology: CT ABDOMEN+PELVIS(CONTRAST  ONLY)(CPT=74177)    Result Date: 12/25/2024  PROCEDURE:  CT ABDOMEN+PELVIS (CONTRAST ONLY) (CPT=74177)  COMPARISON:  [facility], CT, CT ABDOMEN+PELVIS(CPT=74176), 11/16/2024, 2:33 PM.  INDICATIONS:  rectal bleeding  TECHNIQUE:  CT scanning was performed from the dome of the diaphragm to the pubic symphysis with non-ionic intravenous contrast material. Post contrast coronal MPR imaging was performed.  Dose reduction techniques were used. Dose information is transmitted to the ACR (American College of Radiology) NRDR (National Radiology Data Registry) which includes the Dose Index Registry.  PATIENT STATED HISTORY:(As transcribed by Technologist)  Rectal bleeding   CONTRAST USED:  100cc of Isovue 370  FINDINGS: LUNG BASE:  Partially imaged scarring in the right lower lung with associated pleural thickening.  Small right pleural effusion.  Mitral valve prosthesis noted.  Elevation of the right diaphragm. LIVER:  Unremarkable. BILIARY:  Unremarkable. SPLEEN:  Unremarkable. PANCREAS:  Unremarkable. ADRENALS:  Nonspecific adrenal thickening KIDNEYS:  Subcentimeter hypodensities in the kidneys measuring up to 8 mm are too small to further characterize and warrant no specific follow-up. AORTA/VASCULAR:  Unremarkable. RETROPERITONEUM:  Unremarkable. BOWEL/MESENTERY:  There is mild diffuse colonic wall thickening that is concerning for nonspecific colitis.  Clinical correlation recommended.  Uncomplicated colonic diverticulosis.  Scattered feces in the colon.  No large or small bowel dilatation.  No free air or free fluid. ABDOMINAL WALL:  Unremarkable. PELVIC ORGANS:  Urinary bladder wall thickening with fatty induration is concerning for cystitis.  Clinical correlation recommended.  Prostate calcification.  Pelvic phleboliths. LYMPH NODES:  No lymphadenopathy in the abdomen or pelvis. BONES:  Degenerative changes in the spine OTHER:  None.            CONCLUSION:   1. Findings concerning for nonspecific colitis.  Clinical  correlation recommended.  2. Uncomplicated colonic diverticulosis.  3. Urinary bladder wall thickening with fatty induration is concerning for cystitis.  Clinical correlation recommended.   4. Small right pleural effusion.  Please see above for further details.  LOCATION:  [facility]   Dictated by (CST): Jj Conley MD on 12/25/2024 at 5:33 PM     Finalized by (CST): Jj Conley MD on 12/25/2024 at 5:36 PM       NM GI BLOOD LOSS STUDY SPECT/CT (SINGLE) 1 DAY (CPT=78278/84369)    Result Date: 12/19/2024  PROCEDURE:  NM GI BLOOD LOSS STUDY SPECT/CT(SINGLE) 1 DAY (CPT=78278/34983)  COMPARISON:  None.  INDICATION:  Bright red blood per rectum  TECHNIQUE:  26.1 mCi Tc99m autologously labelled RBC's were re-injected intravenously, and dynamic images of the abdomen were obtained in the anterior projection for at least one hour. Additional dedicated SPECT images were taken with concurrent CT scan for both anatomical localization as well as attenuation correction. Scan was reformatted into multi-planar reconstructions on a dedicated workstation.  FINDINGS:  No areas of abnormal radiotracer uptake are seen.  No radiotracer uptake seen within the expected region of the bowel.  There is some questionable wall thickening of the urinary bladder although some of this may be due to incomplete distension.  If clinical symptoms persist then consider follow-up imaging or direct visualization if not already performed.            CONCLUSION:  See above.   LOCATION:  [id number]      Dictated by (CST): Smith Randle MD on 12/19/2024 at 5:36 PM     Finalized by (CST): Smith Randle MD on 12/19/2024 at 5:38 PM       XR CHEST AP PORTABLE  (CPT=71045)    Result Date: 12/17/2024  PROCEDURE:  XR CHEST AP PORTABLE  (CPT=71045)  TECHNIQUE:  AP chest radiograph was obtained.  COMPARISON:  [facility] , XR, XR CHEST AP PORTABLE  (CPT=71045), 12/02/2024, 6:51 PM.  INDICATIONS:  rectal bleeding  PATIENT STATED HISTORY: (As transcribed by Technologist)  Patient  states nausea and weakness for 2 days.    FINDINGS:  Right internal jugular split tip hemodialysis catheter is in stable position.  Confluent opacity right lung base is consistent with right effusion and atelectasis and is unchanged.  Cardiac valve prosthesis is stable.  Heart size is within normal limits.  Mediastinum and elenita are unremarkable.  Chest wall structures are unremarkable.            CONCLUSION:  1. Right dialysis catheter is stable. 2. Confluent opacity right lung base is consistent with effusion and atelectasis or pneumonia.   LOCATION:  [facility]      Dictated by (CST): Adrian Blevins MD on 12/17/2024 at 11:57 AM     Finalized by (CST): Adrian Blevins MD on 12/17/2024 at 11:58 AM       MRI CERVICAL+THORACIC+LUMBAR SPINE  (CPT=72141/74751/65056)    Result Date: 12/6/2024  PROCEDURE:  MRI CERVICAL+THORACIC+LUMBAR SPINE (CPT=72141/61638/99154)  COMPARISON:  [facility], MRI SPINE CERVICAL (CPT=72141), 10/19/2024, 8:25 PM.  INDICATIONS:  Back pain  TECHNIQUE:  A comprehensive examination was performed utilizing a variety of imaging planes and imaging parameters to optimize visualization of suspected pathology.  Images were performed without intravenous contrast.  PATIENT STATED HISTORY: (As transcribed by Technologist)  Pt stated that he developed lower back pain that radiates down his legs.    FINDINGS:  Please note that the exam is somewhat limited due to motion.  Cervical spine:  There is straightening of the normal cervical lordosis.  No significant spondylolisthesis is present.  Vertebral bodies appear maintained in height.  Intervertebral disc spaces appear overall maintained.  No masses or fluid collections are present within the cervical spinal canal.  The cervical spinal cord is normal in course and caliber.  No areas of cord signal abnormality are seen.  Posterior disc osteophyte complex noted at C5-6 and C6-7 with overall mild areas of central canal stenosis present.  This is similar to the prior  exam accounting for differences in technique.  Overall mild left neural foraminal stenosis is favored at C5-6.  Paravertebral soft tissues are unremarkable in appearance.  Thoracic spine:  The exam is significantly limited due to the degree of motion.  There is normal thoracic kyphosis.  No significant spondylolisthesis is present.  Vertebral bodies appear maintained in height.  No definite cord signal abnormality is seen.  Within the limitations of the exam no significant central canal or neural foraminal stenosis is seen within the thoracic spine.  The visualized paravertebral soft tissues are unremarkable in appearance.  Lumbar spine:  There is normal lumbar lordosis.  No significant spondylolisthesis is present.  Vertebral bodies appear maintained in height.  Moderate intervertebral disc space narrowing with diffuse disc desiccation noted at L4-5 and L5-S1.  The distal spinal cord is normal in course and caliber.  No areas of cord signal abnormality are seen.  No evidence of significant central canal or neural foraminal stenosis present at L1-2, L2-3.  At L3-4 there is a minimal diffuse disc bulge with mild facet arthropathy resulting in overall minimal central canal stenosis.  No significant neural foraminal stenosis is seen.  At L4-5 postsurgical changes of prior laminectomy are noted.  A moderate diffuse disc bulge is present.  Overall mild bilateral neural foraminal stenosis is favored.  No significant central canal stenosis is seen.  There is some clumping of nerve roots bilaterally in this region which may represent arachnoiditis.  L5-S1 there is a mild diffuse disc bulge present with mild facet arthropathy.  No significant central canal stenosis is seen.  There is no significant neural foraminal stenosis.  Paravertebral soft tissues are unremarkable in appearance.             CONCLUSION:  Postsurgical changes of presumed prior laminectomy noted at L4-5 with a moderate diffuse disc bulge.  Overall mild  bilateral neural foraminal stenosis is favored with some clumping of the nerve roots bilaterally in this region, which may represent arachnoiditis.  There is mild central canal stenosis noted at C5-6 and C6-7 with mild left neural foraminal stenosis favored at C5-6.     LOCATION:  [facility]   Dictated by (CST): Smith Randle MD on 12/06/2024 at 7:31 AM     Finalized by (CST): Smith Randle MD on 12/06/2024 at 8:25 AM       XR CHEST AP PORTABLE  (CPT=71045)    Result Date: 12/2/2024  PROCEDURE:  XR CHEST AP PORTABLE  (CPT=71045)  TECHNIQUE:  AP chest radiograph was obtained.  COMPARISON:  [facility], XR, XR CHEST AP PORTABLE  (CPT=71045), 11/06/2024, 12:20 PM.  INDICATIONS:  Diaphoresis  PATIENT STATED HISTORY: (As transcribed by Technologist)  Patient offered no additional history at this time.    FINDINGS:  Support devices appear overall stable.  Small right pleural effusion is favored.  A background of mild vascular congestion and volume overload is likely present.  Consolidation at the right lung base is difficult to entirely exclude.  If there  is persistent concern then consider follow-up imaging.            CONCLUSION:  See above.   LOCATION:  [id number]      Dictated by (CST): Smith Randle MD on 12/02/2024 at 7:46 PM     Finalized by (CST): Smith Randle MD on 12/02/2024 at 7:48 PM          Available outpatient records reviewed--    ASSESSMENT / PLAN:     44 year old male with PMH sig for TIA, ESRD on HD, HFpEF, HTN, T2DM, CAD, COPD, PE who presents for GI bleed.     Hematochezia - intermittent  Diverticular bleed?/Diverticulitis?  - Recent EGD and colonoscopy reportedly normal in August 2024, normal capsule study in October 2024  - Tagged RBC scan negative 12/19/24  - Suspect etiology to be secondary to chronic recurrent hemorrhoidal moreso than diverticular bleed. CT on this admit similar to previous with nonspecific colitis  - GI consulted and signed off, no further intervention. Rec fiber, gentle perianal care     #ESRD  on hemodialysis  - Resume normal dialysis schedule     #HTN  -continue Losartan, Coreg, Hydral, Nifedipine     #Cervical radiculopathy  -Pain control as needed  -Follow-up with spine surgeon     # Bipolar disorder  -Continue Abilify, bupropion, Lamictal     # Depression  -Continue fluoxetine     #COPD, chronic  -Continue home inhalers     #chronic diastolic HF- noted      Discussed with GI, nephrology, and patient. He is okay with dc home today. Will f/u with outpt hd tomorrow.      Laila Ryder MD  DM Hospitalist  Pager [phone]  Answering Service number: [phone]         [1]   Allergies  Allergen Reactions    Hydrochlorothiazide RASH and HIVES   [2]   Outpatient Medications Marked as Taking for the 12/25/24 encounter (Hospital Encounter)   Medication Sig Dispense Refill    RENVELA 800 MG Oral Tab Take 3 tablets (2,400 mg total) by mouth 3 (three) times daily with meals.      hydrALAZINE 25 MG Oral Tab Take 1 tablet (25 mg total) by mouth 2 (two) times daily.      losartan 100 MG Oral Tab Take 1 tablet (100 mg total) by mouth daily. 30 tablet 0    gabapentin 100 MG Oral Cap Take 2 capsules (200 mg total) by mouth 3 (three) times daily. 180 capsule 0    NIFEdipine ER 30 MG Oral Tablet 24 Hr Take 1 tablet (30 mg total) by mouth daily. Hold if systolic blood pressure <130 30 tablet 0    VYVANSE 60 MG Oral Cap Take 1 capsule (60 mg total) by mouth every morning.      lamoTRIgine 100 MG Oral Tab Take 1 tablet (100 mg total) by mouth daily.      memantine 5 MG Oral Tab Take 1 tablet (5 mg total) by mouth 2 (two) times daily.      albuterol 108 (90 Base) MCG/ACT Inhalation Aero Soln Inhale 2 puffs into the lungs every 6 (six) hours as needed for Wheezing.      tamsulosin 0.4 MG Oral Cap Take 1 capsule (0.4 mg total) by mouth daily.      ARIPiprazole 15 MG Oral Tab Take 1 tablet (15 mg total) by mouth daily.      buPROPion  MG Oral Tablet 24 Hr Take 1 tablet (150 mg total) by mouth daily.      FLUoxetine HCl  40 MG Oral Cap Take 1 capsule (40 mg total) by mouth daily. 7 capsule 0    carvedilol 25 MG Oral Tab Take 1 tablet (25 mg total) by mouth in the morning and 1 tablet (25 mg total) in the evening. Take with meals. 60 tablet 6    Fenofibrate 134 MG Oral Cap Take 1 capsule by mouth nightly. 90 capsule 1

## 2024-12-26 NOTE — PAYOR COMM NOTE
ADMISSION REVIEW     Payor: [employer]  Subscriber #:  [id number]  Authorization Number: [id number]    Admit date: 12/25/24  Admit time:  9:25 PM       REVIEW DOCUMENTATION:     ED Provider Notes        ED Provider Notes signed by Gama Ray DO at 12/25/2024  6:30 PM       Author: Gama Ray DO Service: -- Author Type: Physician    Filed: 12/25/2024  6:30 PM Date of Service: 12/25/2024  6:15 PM Status: Signed    : Gama Ray DO (Physician)           Patient Seen in: [facility] In [place]      History     Chief Complaint   Patient presents with    Rectal Bleeding     Stated Complaint: rectal bleeding    Subjective:   HPI      This is a 44-year-old male who presents emergency room for evaluation of rectal bleeding.  Patient has a history of end-stage renal disease on dialysis Monday, Wednesday, Friday, reports he did have dialysis yesterday due to the holiday week.  Patient states that he had an episode of rectal bleeding today, states he passed a few blood clots.  Has not had any further episodes.  Does complain of some discomfort in the lower abdomen mostly on the left side.  Denies any diarrhea.  Denies fevers or chills.  Denies chest pain or shortness of breath.  Patient was recently admitted for rectal bleeding,  Was evaluated by GI, had a tagged red blood cell scan performed which was negative.  Patient was told to go back to the ER if he has any further episodes of rectal bleeding.  Patient denies feel lightheaded or dizzy, denies nausea or vomiting.  Objective:     Past Medical History:    Anxiety state    Arrhythmia    Asthma (HCC)    Attention deficit hyperactivity disorder (ADHD)    Back problem    Bipolar 1 disorder (HCC)    CKD (chronic kidney disease) stage 3, GFR 30-59 ml/min ([facility])    Dr Meeks    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HCC)    Congestive heart disease (HCC)    COPD (chronic obstructive pulmonary disease) ([facility])    Coronary atherosclerosis    Deep vein  thrombosis (HCC)    at age 17 R/T cast    Depression    Diabetes (HCC)    Dialysis patient (HCC)    Diverticulosis of large intestine    Essential hypertension    3/21 echo: severe concentric LVH with normal EF and no MR or pHTN    Extrinsic asthma, unspecified    Heart attack (HCC)    2016- angiogram- no intervention    Heart valve disease    mitral valve repair in 1994/    High blood pressure    High cholesterol    History of blood transfusion    History of mitral valve repair    Hyperlipidemia    Low back pain    tight and stiff after sweeping and mopping    LVH (left ventricular hypertrophy)    Migraines    Mixed hyperlipidemia     HDL 38 LDL 97 VLDL 57     Monoclonal gammopathy    IgG kappa     Muscle weakness    MVP (mitral valve prolapse)    Repair 1994 at [place]; echoes as recently as 3/21 show mild or trivial MR and no stenosis    Neuropathy    Osteoarthritis    hip ,knees    Pneumonia due to organism    Pulmonary embolism (HCC)    Renal disorder    Stroke (HCC)    TIA (transient ischemic attack)    Initial history of left-sided weakness and slurred speech. (+) cocaine. MRI of the brain, CT angiogram of the head and neck, and 2D echo are all unremarkable.     TMJ (dislocation of temporomandibular joint)    Troponin level elevated    Trop 60 60 47 with TIA and no CP: Lexiscan negative with EF 51    Visual impairment              Past Surgical History:   Procedure Laterality Date    Av fistula revision, open Left     Cabg      Colonoscopy N/A 03/26/2023    Procedure: COLONOSCOPY;  Surgeon: Heath Vu MD;  Location:  ENDOSCOPY    Colonoscopy N/A 12/30/2023    Procedure: COLONOSCOPY with cold snare polypectomy and forcep polypectomy;  Surgeon: Ousmane Suarez MD;  Location:  ENDOSCOPY    Colonoscopy & polypectomy  2019    Egd  2019    Duodenitis. Biopsied. EUS for weight loss was negative    Heart surgery      Hernia surgery  08/17/2022    Dr Barnes    Laminectomy,>2 sgmt,lumbar  09/06/2018     L4-L5 Decomp Discectomy ROEM L4-L5    Mitralplasty w cp bypass      [place]: Repair    Repair rotator cuff,chronic Left     torn and had a ruptured bicep    Sinus surgery        Spine surgery procedure unlisted      Valve repair      mitral valve                Social History     Socioeconomic History    Marital status:    Tobacco Use    Smoking status: Former     Current packs/day: 0.00     Average packs/day: 1 pack/day for 27.0 years (27.0 ttl pk-yrs)     Types: Cigarettes     Start date: 1995     Quit date: 2022     Years since quittin.8     Passive exposure: Never    Smokeless tobacco: Never   Vaping Use    Vaping status: Never Used   Substance and Sexual Activity    Alcohol use: No    Drug use: No     Social Drivers of Health     Financial Resource Strain: Medium Risk (2024)    Received from [facility]    Overall Financial Resource Strain (CARDIA)     Difficulty of Paying Living Expenses: Somewhat hard   Food Insecurity: No Food Insecurity (2024)    Food Insecurity     Food Insecurity: Never true   Transportation Needs: No Transportation Needs (2024)    Transportation Needs     Lack of Transportation: No   Physical Activity: Inactive (2024)    Received from [facility]    Exercise Vital Sign     Days of Exercise per Week: 0 days     Minutes of Exercise per Session: 0 min   Stress: Stress Concern Present (2024)    Received from [facility]    [place] [employer] - Occupational Stress Questionnaire     Feeling of Stress : Rather much   Social Connections: Unknown (2024)    Received from [facility]    Social Connection and Isolation Panel [NHANES]     Frequency of Communication with Friends and Family: More than three times a week     Frequency of Social Gatherings with Friends and Family: More than three times a week     Attends Protestant Services: Never     Active Member of Clubs or  Organizations: No     Attends Club or Organization Meetings: Never     Marital Status: Patient unable to answer   Housing Stability: Low Risk  (12/17/2024)    Housing Stability     Housing Instability: No                  Physical Exam     ED Triage Vitals [12/25/24 1437]   /90   Pulse 97   Resp 22   Temp 97.6 °F (36.4 °C)   Temp src Oral   SpO2 96 %   O2 Device None (Room air)       Current Vitals:   Vital Signs  BP: 136/87  Pulse: 89  Resp: 20  Temp: 97.6 °F (36.4 °C)  Temp src: Oral    Oxygen Therapy  SpO2: 96 %  O2 Device: None (Room air)        Physical Exam  GENERAL: Patient is awake, alert, in no acute distress.  HEENT: no scleral icterus.  Mucous membranes are moist,  HEART: Regular rate and rhythm, no murmurs.  LUNGS: Clear to auscultation bilaterally.  No Rales, no rhonchi, no wheezing, no stridor.  ABDOMEN: Soft, nondistended, left-sided tender, no rebound, no rigidity, no guarding.no pulsatile masses. No CVA tenderness  EXTREMITIES: No peripheral edema, no calf tenderness    ED Course     Labs Reviewed   COMP METABOLIC PANEL (14) - Abnormal; Notable for the following components:       Result Value    CO2 20.0 (*)     BUN 90 (*)     Creatinine 11.53 (*)     Calcium, Total 8.6 (*)     Calculated Osmolality 317 (*)     eGFR-Cr 5 (*)     ALT 9 (*)     All other components within normal limits   CBC WITH DIFFERENTIAL WITH PLATELET - Abnormal; Notable for the following components:    RBC 2.66 (*)     HGB 8.7 (*)     HCT 25.2 (*)     All other components within normal limits   LIPASE - Abnormal; Notable for the following components:    Lipase 57 (*)     All other components within normal limits   PROTHROMBIN TIME (PT) - Normal   TYPE AND SCREEN    Narrative:     The following orders were created for panel order Type and screen.  Procedure                               Abnormality         Status                     ---------                               -----------         ------                     ABORH  (Blood Type)[[id number]]                               Final result               Antibody Screen[[id number]]                                  Final result                 Please view results for these tests on the individual orders.   ABORH (BLOOD TYPE)   ANTIBODY SCREEN   RAINBOW DRAW LAVENDER   RAINBOW DRAW LIGHT GREEN   RAINBOW DRAW BLUE         MDM        Differential diagnosis before testing includes but not limited to diverticulitis, diverticular bleed, electrode abnormality, coagulopathy, which is a medical condition that poses a threat to life/function    Past Medical History/comorbidities-as noted in HPI    Radiographic images  I personally reviewed the radiographs and my individual interpretation shows CT of the abdomen, no free air  I also reviewed the official reports that showed CT, concerning for nonspecific colitis.  Uncomplicated diverticulosis.  Urinary bladder wall thickening.  Small right pleural effusion.    Discussion of management (consult/physicians, social work, pharmacy,ect) GI mary Jacques hospitalist physician    Course of Events during Emergency Room Visit include patient IV established, blood work obtained.  Chemistry sodium 140 potassium 4.6 BUN 90 creatinine 11.53 glucose 94.  CBC white count 6.1 hemoglobin 8.7 platelet 209, on review of medical records patient hemoglobin on 12/21/2024 was 9.6.  CT performed.  Patient was discussed with GI, has not had any episodes of rectal bleeding throughout ER evaluation and is hemodynamically stable.  Will admit for further evaluation, discussed with hospitalist physician.  Discussed all results with the patient he is agrees with plan.    Shared decision making was utilized           Disposition:    Admission  I have discussed with the patient the results of test, differential diagnosis, and treatment plan. They expressed clear understanding of these instructions and agrees to the plan provided.       Note to patient: The 21st Century Cures  Act makes medical notes like these available to patients in the interest of transparency. However, this is a medical document intended as peer to peer communication. It is written in medical language and may contain abbreviations or verbiage that are unfamiliar. It may appear blunt or direct. Medical documents are intended to carry relevant information, facts as evident, and the clinical opinion of the practitioner.             Admission disposition: 12/25/2024  6:12 PM           Medical Decision Making      Disposition and Plan     Clinical Impression:  1. Rectal bleeding    2. Anemia, unspecified type    3. ESRD on hemodialysis (HCC)    4. Acute colitis         Disposition:  Admit  12/25/2024  6:12 pm    Follow-up:  No follow-up provider specified.        Medications Prescribed:  Current Discharge Medication List              Supplementary Documentation:         Hospital Problems       Present on Admission  Date Reviewed: 12/20/2024            ICD-10-CM Noted POA    * (Principal) Rectal bleeding K62.5 3/12/2021 Unknown                Signed by Gama Ray DO on 12/25/2024  6:30 PM         MEDICATIONS ADMINISTERED IN LAST 1 DAY:  ARIPiprazole (Abilify) tab 15 mg       Date Action Dose Route User    12/26/2024 0925 Given 15 mg Oral Jie Dickerson RN          buPROPion ER (Wellbutrin XL) 24 hr tab 150 mg       Date Action Dose Route User    12/26/2024 0925 Given 150 mg Oral Jie Dickerson RN          carvedilol (Coreg) tab 25 mg       Date Action Dose Route User    12/26/2024 0926 Given 25 mg Oral Jie Dickerson RN          FLUoxetine (PROzac) cap 40 mg       Date Action Dose Route User    12/26/2024 0926 Given 40 mg Oral Jie Dickerson RN          gabapentin (Neurontin) cap 200 mg       Date Action Dose Route User    12/26/2024 0925 Given 200 mg Oral Jie Dickerson RN    12/25/2024 2249 Given 200 mg Oral Kerry Shell RN          hydrALAzine (Apresoline) 20 mg/mL injection 10 mg       Date Action Dose Route User     12/26/2024 0427 Given 10 mg Intravenous Kerry Shell RN          hydrALAZINE (Apresoline) tab 25 mg       Date Action Dose Route User    12/26/2024 0926 Given 25 mg Oral Jie Dickerson RN    12/25/2024 2249 Given 25 mg Oral Kerry Shell RN          HYDROmorphone (Dilaudid) 1 MG/ML injection 0.5 mg       Date Action Dose Route User    12/25/2024 1852 Given 0.5 mg Intravenous Lizzy Darnell RN          HYDROmorphone (Dilaudid) 1 MG/ML injection 0.5 mg       Date Action Dose Route User    12/25/2024 2249 Given 0.5 mg Intravenous Kerry Shell RN          HYDROmorphone (Dilaudid) 1 MG/ML injection       Date Action Dose Route User    12/26/2024 0427 Given (none) (none) Kerry Shell RN          iopamidol 76% (ISOVUE-370) injection for power injector       Date Action Dose Route User    12/25/2024 1730 Given 100 mL Intravenous Eyal Madina          lamoTRIgine (LaMICtal) tab 100 mg       Date Action Dose Route User    12/26/2024 0925 Given 100 mg Oral Jie Dickerson RN          losartan (Cozaar) tab 100 mg       Date Action Dose Route User    12/26/2024 0925 Given 100 mg Oral Jie Dickerson RN          memantine (Namenda) tab 5 mg       Date Action Dose Route User    12/26/2024 0927 Given 5 mg Oral Jie Dickerson RN    12/25/2024 2249 Given 5 mg Oral Kerry Shell RN          NIFEdipine ER (Procardia-XL) 24 hr tab 30 mg       Date Action Dose Route User    12/26/2024 0925 Given 30 mg Oral Jie Dickerson RN          tamsulosin (Flomax) cap 0.4 mg       Date Action Dose Route User    12/26/2024 0925 Given 0.4 mg Oral Jie Dickerson RN            Vitals (last day)       Date/Time Temp Pulse Resp BP SpO2 Weight O2 Device O2 Flow Rate (L/min) [facility]    12/26/24 1153 98.2 °F (36.8 °C) -- 18 -- 98 % -- None (Room air) -- GM    12/26/24 1153 -- 86 -- 148/90 -- -- -- -- SG    12/26/24 1023 -- -- -- -- -- 240 lb (108.9 kg) -- -- DJ    12/26/24 0756 97.6 °F (36.4 °C) 82 18 158/98 98 % -- None (Room air) --  GM    12/26/24 0422 97.7 °F (36.5 °C) 90 -- 167/108 95 % -- None (Room air) -- PW    12/26/24 0000 97.6 °F (36.4 °C) 85 -- 159/109 -- -- None (Room air) -- PW    12/25/24 2011 97.7 °F (36.5 °C) 90 18 155/105 94 % -- None (Room air) -- MS    12/25/24 1928 -- 96 20 156/111 95 % -- None (Room air) -- KK    12/25/24 1823 -- 89 20 136/87 96 % -- None (Room air) -- KK    12/25/24 1604 -- 90 20 150/107 94 % -- None (Room air) -- KK    12/25/24 1437 97.6 °F (36.4 °C) 97 22 137/90 96 % 240 lb (108.9 kg) None (Room air) -- LM          12/26/2024 Gastroenterology Consult    12/26/2024     Godwin Fonseca  male    Heath Vu MD      [id number]  4/12/1980 Primary Care Physician  Adrian Small MD      Reason for Consultation: chronic abd pain, rectal bleeding     HPI: 44M w/ mmp including ESRD on HD, MGUS, CAD, CHF, DM2, COPD, bipolar disorder + depression, among other issues. Very well-known to [facility] from prior cares. Just recently dc'd 12/20 from [facility] after OSH hospital stay, recent OSH EGD + COL + VCE unrevealing for overt single-point GIB source, NM tRBC scan done 12/19 in the midst of rectal bleeding and was negative. See Consult Note from Dr. Alfaro dated 12/18/24 for more recent details.    Returned to [facility] ED w/ episodes of BRBPR + ongoing chronic abd pain, similar to prior episodes. Con CT AP performed w/ nonspecific colitis but o/w largely bland (similar to previous imaging, likely chronic features per prior CT review, and recent COL unrevealing for active colonic inflammatory disease).     States he was having diarrhea + BRBPR prior to ED presentation, none noted since arriving. Remains NPO, wants to eat.     PROBLEM LIST:          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 (HCC)    Chronic non-seasonal allergic rhinitis    Chronic sinusitis, unspecified location    ADHD (attention deficit hyperactivity disorder), inattentive type    Bipolar  depression (HCC)    Essential hypertension    Mild persistent asthma without complication (HCC)    CKD (chronic kidney disease) stage 3, GFR 30-59 ml/min (HCC)    Self-inflicted injury    Bipolar disorder in partial remission, most recent episode unspecified type (HCC)    Family history of prostate cancer    Fatigue, unspecified type    Alkaline phosphatase elevation    Hyperlipidemia, mixed    Low serum HDL    Spinal stenosis of lumbar region with neurogenic claudication    Prolapsed lumbar disc    Status post lumbar surgery    Cauda equina syndrome (HCC)    Lumbar disc prolapse with compression radiculopathy    Syncope and collapse    Hypokalemia    Orthostatic hypotension    Hypertension, unspecified type    Weight loss    Chest pain, unspecified type    History of mitral valve stenosis    Acute pericarditis, unspecified type (HCC)    Cervical radiculopathy    Elevated blood protein    IgG monoclonal protein disorder    MGUS (monoclonal gammopathy of unknown significance)    Hypertensive urgency    Bipolar 2 disorder (HCC)    Employment problem    Stress    Anxiety disorder, unspecified type    Weakness of left lower extremity    Rectal bleeding    Paresthesia of foot, bilateral    Obesity (BMI 30-39.9)    TIA (transient ischemic attack)    TMJ dysfunction    Inverted papilloma of nasal cavity    Type 2 diabetes mellitus with stage 3b chronic kidney disease, without long-term current use of insulin (HCC)    Acute kidney injury (HCC)    Metabolic acidosis    Hyperglycemia    Chronic kidney disease, unspecified CKD stage    Abdominal distension    SOB (shortness of breath)    Hypertensive crisis    Acute on chronic congestive heart failure, unspecified heart failure type (HCC)    Acute congestive heart failure (HCC)    Acute congestive heart failure, unspecified heart failure type (HCC)    Acute on chronic diastolic congestive heart failure (HCC)    Stage 4 chronic kidney disease (HCC)    ROBERT (acute kidney injury)  ([facility])    Abdominal pain, left lower quadrant    Hypertensive emergency    Acute chest pain    Acute on chronic renal insufficiency    Chronic abdominal pain    Nonintractable headache, unspecified chronicity pattern, unspecified headache type    Chronic right shoulder pain    HCAP (healthcare-associated pneumonia)    Acute intractable headache, unspecified headache type    ESRD (end stage renal disease) on dialysis ([facility])    Diarrhea, unspecified type    Primary hypertension    Dyspnea, unspecified type    Abdominal pain, acute    ESRD on dialysis ([facility])    Fluid overload    ESRD needing dialysis ([facility])    COVID-19 virus infection    Hypotension, unspecified hypotension type    Anemia    Acute renal failure (ARF) ([facility])    Neck pain    Acute nonintractable headache, unspecified headache type    GI bleed    Chronic kidney disease with end stage renal failure on dialysis (HCC)    Lower abdominal pain    ESRD (end stage renal disease) ([facility])    Resistant hypertension    Community acquired pneumonia of right lower lobe of lung    Acute renal failure with acute renal cortical necrosis superimposed on stage 4 chronic kidney disease ([facility])    Acute renal failure, unspecified acute renal failure type (HCC)    Hypervolemia, unspecified hypervolemia type    End stage renal disease on dialysis ([facility])    Acute respiratory failure with hypoxia ([facility])    Stage 5 chronic kidney disease on chronic dialysis ([facility])    Anemia, unspecified type    Hyperkalemia    Hemodialysis catheter infection, initial encounter ([facility])    Type 2 diabetes mellitus with chronic kidney disease on chronic dialysis, without long-term current use of insulin (HCC)    Coronary artery disease involving native coronary artery of native heart without angina pectoris    Pneumonia of right lower lobe due to infectious organism    Expressive aphasia    Uncontrolled hypertension    Bipolar disorder with moderate depression ([facility])    BRBPR (bright red blood per rectum)     Weakness    Nosebleed    ESRD on hemodialysis ([facility])    Acute colitis         PATIENT HISTORY:      Past Medical History       Past Medical History:    Anxiety state    Arrhythmia    Asthma ([facility])    Attention deficit hyperactivity disorder (ADHD)    Back problem    Bipolar 1 disorder ([facility])    CKD (chronic kidney disease) stage 3, GFR 30-59 ml/min ([facility])     Dr Meeks    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[facility])    Congestive heart disease ([facility])    COPD (chronic obstructive pulmonary disease) ([facility])    Coronary atherosclerosis    Deep vein thrombosis ([facility])     at age 17 R/T cast    Depression    Diabetes ([facility])    Dialysis patient ([facility])    Diverticulosis of large intestine    Essential hypertension     3/21 echo: severe concentric LVH with normal EF and no MR or pHTN    Extrinsic asthma, unspecified    Heart attack ([facility])     2016- angiogram- no intervention    Heart valve disease     mitral valve repair in 1994/    High blood pressure    High cholesterol    History of blood transfusion    History of mitral valve repair    Hyperlipidemia    Low back pain     tight and stiff after sweeping and mopping    LVH (left ventricular hypertrophy)    Migraines    Mixed hyperlipidemia      HDL 38 LDL 97 VLDL 57     Monoclonal gammopathy     IgG kappa     Muscle weakness    MVP (mitral valve prolapse)     Repair 1994 at [place]; echoes as recently as 3/21 show mild or trivial MR and no stenosis    Neuropathy    Osteoarthritis     hip ,knees    Pneumonia due to organism    Pulmonary embolism ([facility])    Renal disorder    Stroke ([facility])    TIA (transient ischemic attack)     Initial history of left-sided weakness and slurred speech. (+) cocaine. MRI of the brain, CT angiogram of the head and neck, and 2D echo are all unremarkable.     TMJ (dislocation of temporomandibular joint)    Troponin level elevated     Trop 60 60 47 with TIA and no CP: Lexiscan negative with EF 51    Visual impairment         Past Surgical History         Past  Surgical History:   Procedure Laterality Date    Av fistula revision, open Left      Cabg        Colonoscopy N/A 03/26/2023     Procedure: COLONOSCOPY;  Surgeon: Heath Vu MD;  Location:  ENDOSCOPY    Colonoscopy N/A 12/30/2023     Procedure: COLONOSCOPY with cold snare polypectomy and forcep polypectomy;  Surgeon: Ousmane Suarez MD;  Location:  ENDOSCOPY    Colonoscopy & polypectomy   2019    Egd   2019     Duodenitis. Biopsied. EUS for weight loss was negative    Heart surgery        Hernia surgery   08/17/2022     Dr Barnes    Laminectomy,>2 sgmt,lumbar   09/06/2018     L4-L5 Decomp Discectomy ROEM L4-L5    Mitralplasty w cp bypass   1994     [place]: Repair    Repair rotator cuff,chronic Left       torn and had a ruptured bicep    Sinus surgery          Spine surgery procedure unlisted        Valve repair   1994     mitral valve         Family History         Family History   Problem Relation Age of Onset    Hypertension Father      Alcohol and Other Disorders Associated Father      Substance Abuse Father           cocaine    Dementia Father      Cancer Father           lung    Diabetes Mother      Cancer Mother           multiple myeloma    Hypertension Mother      Anxiety Maternal Aunt      Depression Maternal Aunt      Anxiety Maternal Aunt      Depression Maternal Aunt      Bipolar Disorder Maternal Aunt      Diabetes Maternal Grandmother      Hypertension Maternal Grandmother      Cancer Maternal Grandfather           stomach cancer    Diabetes Maternal Grandfather      Hypertension Maternal Grandfather      Alcohol and Other Disorders Associated Maternal Grandfather      Hypertension Paternal Grandmother      Hypertension Paternal Grandfather      Cancer Sister           uterine and ovarian    Hypertension Sister      Cancer Maternal Uncle           lung    Cancer Paternal Aunt           throat         Short Social Hx on File   Social History            Socioeconomic History    Marital status:     Tobacco Use    Smoking status: Former       Current packs/day: 0.00       Average packs/day: 1 pack/day for 27.0 years (27.0 ttl pk-yrs)       Types: Cigarettes       Start date: 1995       Quit date: 2022       Years since quittin.8       Passive exposure: Never    Smokeless tobacco: Never   Vaping Use    Vaping status: Never Used   Substance and Sexual Activity    Alcohol use: No    Drug use: No      Social Drivers of Health           Financial Resource Strain: Medium Risk (2024)     Received from [facility]     Overall Financial Resource Strain (CARDIA)      Difficulty of Paying Living Expenses: Somewhat hard   Food Insecurity: No Food Insecurity (2024)     Food Insecurity      Food Insecurity: Never true   Transportation Needs: No Transportation Needs (2024)     Transportation Needs      Lack of Transportation: No   Physical Activity: Inactive (2024)     Received from [facility]     Exercise Vital Sign      Days of Exercise per Week: 0 days      Minutes of Exercise per Session: 0 min   Stress: Stress Concern Present (2024)     Received from [facility]     [place] [employer] - Occupational Stress Questionnaire      Feeling of Stress : Rather much   Social Connections: Unknown (2024)     Received from [facility]     Social Connection and Isolation Panel [NHANES]      Frequency of Communication with Friends and Family: More than three times a week      Frequency of Social Gatherings with Friends and Family: More than three times a week      Attends Adventism Services: Never      Active Member of Clubs or Organizations: No      Attends Club or Organization Meetings: Never      Marital Status: Patient unable to answer   Housing Stability: Low Risk  (2024)     Housing Stability      Housing Instability: No            Allergies;  [Allergies]    [Allergies]       Allergen Reactions     Hydrochlorothiazide RASH and HIVES         Medications:    Current Hospital Medications      Current Facility-Administered Medications:     albuterol (Ventolin HFA) 108 (90 Base) MCG/ACT inhaler 2 puff, 2 puff, Inhalation, Q6H PRN    ARIPiprazole (Abilify) tab 15 mg, 15 mg, Oral, Daily    buPROPion ER (Wellbutrin XL) 24 hr tab 150 mg, 150 mg, Oral, Daily    carvedilol (Coreg) tab 25 mg, 25 mg, Oral, BID with meals    FLUoxetine (PROzac) cap 40 mg, 40 mg, Oral, Daily    fluticasone propionate (Flonase) 50 MCG/ACT nasal suspension 1 spray, 1 spray, Each Nare, BID PRN    gabapentin (Neurontin) cap 200 mg, 200 mg, Oral, TID    hydrALAZINE (Apresoline) tab 25 mg, 25 mg, Oral, BID    lamoTRIgine (LaMICtal) tab 100 mg, 100 mg, Oral, Daily    losartan (Cozaar) tab 100 mg, 100 mg, Oral, Daily    memantine (Namenda) tab 5 mg, 5 mg, Oral, BID    NIFEdipine ER (Procardia-XL) 24 hr tab 30 mg, 30 mg, Oral, Daily    tamsulosin (Flomax) cap 0.4 mg, 0.4 mg, Oral, Daily    hydrALAzine (Apresoline) 20 mg/mL injection 10 mg, 10 mg, Intravenous, Q4H PRN         REVIEW OF SYSTEMS:   10pt ROS performed and o/w negative, see HPI for pertinent details.        EXAM:   BP (!) 158/98 (BP Location: Right arm)   Pulse 82   Temp 97.6 °F (36.4 °C) (Oral)   Resp 18   Ht 6' 2\" (1.88 m)   Wt 240 lb (108.9 kg)   SpO2 98%   BMI 30.81 kg/m²  Body mass index is 30.81 kg/m².  Gen: cooperative, pleasant, not diaphoretic, nad   HEENT: ncat, normal neck ROM, normal op w/o ulcer/exudate, anicteric, mmm   Resp/Chest: normal respiratory effort, not dyspneic, no incr WOB/cough  CV: no reported palpitations or syncope  Abd: nt, nd, no clinical features suggestive of peritoneal s/s noted  Ext: no c/c/e   Skin: warm, perfused, no jaundice, no pallor  Neuro: aaox3, grossly intact, no asterixis noted, normal speech         LAB/IMAGING RESULTS:            Lab Results   Component Value Date     WBC 5.3 12/26/2024     HGB 8.3 12/26/2024     HCT 25.0 12/26/2024      .0 12/26/2024      [unfilled]        Lab Results   Component Value Date     WBC 5.3 12/26/2024     HGB 8.3 12/26/2024     HCT 25.0 12/26/2024     .0 12/26/2024     CREATSERUM 11.06 12/26/2024     BUN 90 12/26/2024      12/26/2024     K 4.3 12/26/2024      12/26/2024     CO2 18.0 12/26/2024     GLU 82 12/26/2024     CA 8.5 12/26/2024     ALB 4.2 12/25/2024     ALKPHO 81 12/25/2024     BILT 0.5 12/25/2024     TP 7.0 12/25/2024     AST 25 12/25/2024     ALT 9 12/25/2024     INR 1.11 12/25/2024     PTP 14.1 12/25/2024     LIP 57 12/25/2024     MG 2.1 12/26/2024      Con CT AT 12/25/24 (Images reviewed, similar thickening of the L colon on 11/16/24 CT. No acute inflammatory changes surrounding the colon on both the 12/25 and 11/16 images).  LUNG BASE:  Partially imaged scarring in the right lower lung with associated pleural thickening.  Small right pleural effusion.  Mitral valve prosthesis noted.  Elevation of the right diaphragm.  LIVER:  Unremarkable.  BILIARY:  Unremarkable.  SPLEEN:  Unremarkable.  PANCREAS:  Unremarkable.  ADRENALS:  Nonspecific adrenal thickening  KIDNEYS:  Subcentimeter hypodensities in the kidneys measuring up to 8 mm are too small to further characterize and warrant no specific follow-up.  AORTA/VASCULAR:  Unremarkable.  RETROPERITONEUM:  Unremarkable.  BOWEL/MESENTERY:  There is mild diffuse colonic wall thickening that is concerning for nonspecific colitis.  Clinical correlation recommended.  Uncomplicated colonic diverticulosis.  Scattered feces in the colon.  No large or small bowel dilatation.  No  free air or free fluid.  ABDOMINAL WALL:  Unremarkable.  PELVIC ORGANS:  Urinary bladder wall thickening with fatty induration is concerning for cystitis.  Clinical correlation recommended.  Prostate calcification.  Pelvic phleboliths.  LYMPH NODES:  No lymphadenopathy in the abdomen or pelvis.  BONES:  Degenerative changes in the spine  OTHER:  None.                    Impression   CONCLUSION:       1. Findings concerning for nonspecific colitis.  Clinical correlation recommended.     2. Uncomplicated colonic diverticulosis.     3. Urinary bladder wall thickening with fatty induration is concerning for cystitis.  Clinical correlation recommended.       4. Small right pleural effusion.         ASSESSMENT AND PLAN:   #Chronic multifactorial abdominal pain  #Abnormal CT of abdomen (likely chronic changes per review of prior imaging)  #Extensive colonic diverticulosis without overt features suggestive of active diverticulitis  #Complicated hemorrhoids, likely recurrent hemorrhoidal bleeding d/t trauma     Ordered stool studies + FCP in case diarrhea recurs this admission. Diarrhea + BRBPR already resolved per pt though.  Reviewed CT imaging from yesterday and from 11/16/24, largely similar w/o any new acute inflammatory surrounding changes. Suspect chronic changes that would not directly contribute to acute abd pain in a pt w/ known intractable multifactorial chronic abd pain w/ significant functional component.  No plans for endoscopic evaluation at this time, may resume PO intake (Renal Diet ADAT).  Could potentially consider renally-dosed trial of Cipro + Flagyl x10d if stool studies positive for Infectious Colitis or sxs more suggestive of acute diverticulitis.  No immediate indication for repeat endoscopic evaluation given extensive repeated endoscopic + imaging evaluations over the last 2yrs, including recent OSH EGD + COL and negative VCE w/ [facility], along w/ negative tRBC scan 12/19/24.  Suspect chronic recurrent hemorrhoidal rather than diverticular bleeding. Manage supportively, reviewed gentle perianal cares + fiber supplementation + Bidet/washing methods again w/ pt.  Likely poor surgical candidate given multitude of other chronic comorbidities, supportive care advised.   May need to pursue Surgery consultation through academic center given multiple comorbidities,  suspect high-risk for recovery/complications if hemorrhoidal or colonic resection attempted. Discussed today.  Question raised re: chronic cystitis/prostatitis contributing. Advised pt to d/w PCP + Renal + Urology further.  Will sign off for now, call if questions arise.  Discussed w/ pt + RN this morning.  OK to dc from a GI standpoint whenever appropriate.     The patient indicates understanding of these issues and agrees to the plan.        A total of 60 minutes was spent in direct patient care + assessment, chart review, and care coordination today.     Heath Vu MD  Department of Gastroenterology  [facility]  12/26/2024  8:13 AM

## 2024-12-26 NOTE — PROGRESS NOTES
NURSING DISCHARGE NOTE    Discharged Home via Wheelchair.  Accompanied by  none  Belongings Taken by patient/family.    Patient discharged via uber. Discharge paperwork given to patient. Instructed to follow up with PCP and go to outpatient dialysis appointment tomorrow. Patient verbalized understanding. IV removed and intact. All questions and concerns addressed w/ patient.

## 2024-12-26 NOTE — ED PROVIDER NOTES
Patient Seen in: [facility] In [place]      History     Chief Complaint   Patient presents with    Rectal Bleeding     Stated Complaint: rectal bleeding    Subjective:   HPI      This is a 44-year-old male who presents emergency room for evaluation of rectal bleeding.  Patient has a history of end-stage renal disease on dialysis Monday, Wednesday, Friday, reports he did have dialysis yesterday due to the holiday week.  Patient states that he had an episode of rectal bleeding today, states he passed a few blood clots.  Has not had any further episodes.  Does complain of some discomfort in the lower abdomen mostly on the left side.  Denies any diarrhea.  Denies fevers or chills.  Denies chest pain or shortness of breath.  Patient was recently admitted for rectal bleeding,  Was evaluated by GI, had a tagged red blood cell scan performed which was negative.  Patient was told to go back to the ER if he has any further episodes of rectal bleeding.  Patient denies feel lightheaded or dizzy, denies nausea or vomiting.  Objective:     Past Medical History:    Anxiety state    Arrhythmia    Asthma ([facility])    Attention deficit hyperactivity disorder (ADHD)    Back problem    Bipolar 1 disorder ([facility])    CKD (chronic kidney disease) stage 3, GFR 30-59 ml/min ([facility])    Dr Meeks    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HCC)    Congestive heart disease (HCC)    COPD (chronic obstructive pulmonary disease) ([facility])    Coronary atherosclerosis    Deep vein thrombosis ([facility])    at age 17 R/T cast    Depression    Diabetes ([facility])    Dialysis patient (HCC)    Diverticulosis of large intestine    Essential hypertension    3/21 echo: severe concentric LVH with normal EF and no MR or pHTN    Extrinsic asthma, unspecified    Heart attack (HCC)    2016- angiogram- no intervention    Heart valve disease    mitral valve repair in 1994/    High blood pressure    High cholesterol    History of blood transfusion    History of mitral valve  repair    Hyperlipidemia    Low back pain    tight and stiff after sweeping and mopping    LVH (left ventricular hypertrophy)    Migraines    Mixed hyperlipidemia     HDL 38 LDL 97 VLDL 57     Monoclonal gammopathy    IgG kappa     Muscle weakness    MVP (mitral valve prolapse)    Repair 1994 at [facility]; echoes as recently as 3/21 show mild or trivial MR and no stenosis    Neuropathy    Osteoarthritis    hip ,knees    Pneumonia due to organism    Pulmonary embolism (HCC)    Renal disorder    Stroke (HCC)    TIA (transient ischemic attack)    Initial history of left-sided weakness and slurred speech. (+) cocaine. MRI of the brain, CT angiogram of the head and neck, and 2D echo are all unremarkable.     TMJ (dislocation of temporomandibular joint)    Troponin level elevated    Trop 60 60 47 with TIA and no CP: Lexiscan negative with EF 51    Visual impairment              Past Surgical History:   Procedure Laterality Date    Av fistula revision, open Left     Cabg      Colonoscopy N/A 03/26/2023    Procedure: COLONOSCOPY;  Surgeon: Heath Vu MD;  Location:  ENDOSCOPY    Colonoscopy N/A 12/30/2023    Procedure: COLONOSCOPY with cold snare polypectomy and forcep polypectomy;  Surgeon: Ousmane Suarez MD;  Location:  ENDOSCOPY    Colonoscopy & polypectomy  2019    Egd  2019    Duodenitis. Biopsied. EUS for weight loss was negative    Heart surgery      Hernia surgery  08/17/2022    Dr Barnes    Laminectomy,>2 sgmt,lumbar  09/06/2018    L4-L5 Decomp Discectomy ROEM L4-L5    Mitralplasty w cp bypass  1994    [facility]: Repair    Repair rotator cuff,chronic Left     torn and had a ruptured bicep    Sinus surgery        Spine surgery procedure unlisted      Valve repair  1994    mitral valve                Social History     Socioeconomic History    Marital status:    Tobacco Use    Smoking status: Former     Current packs/day: 0.00     Average packs/day: 1 pack/day for 27.0 years (27.0 ttl pk-yrs)      Types: Cigarettes     Start date: 1995     Quit date: 2022     Years since quittin.8     Passive exposure: Never    Smokeless tobacco: Never   Vaping Use    Vaping status: Never Used   Substance and Sexual Activity    Alcohol use: No    Drug use: No     Social Drivers of Health     Financial Resource Strain: Medium Risk (2024)    Received from [facility]    Overall Financial Resource Strain (CARDIA)     Difficulty of Paying Living Expenses: Somewhat hard   Food Insecurity: No Food Insecurity (2024)    Food Insecurity     Food Insecurity: Never true   Transportation Needs: No Transportation Needs (2024)    Transportation Needs     Lack of Transportation: No   Physical Activity: Inactive (2024)    Received from [facility]    Exercise Vital Sign     Days of Exercise per Week: 0 days     Minutes of Exercise per Session: 0 min   Stress: Stress Concern Present (2024)    Received from [facility]    Citizen of [place] [employer] Occupational [employer] - Occupational Stress Questionnaire     Feeling of Stress : Rather much   Social Connections: Unknown (2024)    Received from [facility]    Social Connection and Isolation Panel [NHANES]     Frequency of Communication with Friends and Family: More than three times a week     Frequency of Social Gatherings with Friends and Family: More than three times a week     Attends Episcopalian Services: Never     Active Member of Clubs or Organizations: No     Attends Club or Organization Meetings: Never     Marital Status: Patient unable to answer   Housing Stability: Low Risk  (2024)    Housing Stability     Housing Instability: No                  Physical Exam     ED Triage Vitals [24 1437]   /90   Pulse 97   Resp 22   Temp 97.6 °F (36.4 °C)   Temp src Oral   SpO2 96 %   O2 Device None (Room air)       Current Vitals:   Vital Signs  BP: 136/87  Pulse: 89  Resp: 20  Temp: 97.6 °F (36.4 °C)  Temp  src: Oral    Oxygen Therapy  SpO2: 96 %  O2 Device: None (Room air)        Physical Exam  GENERAL: Patient is awake, alert, in no acute distress.  HEENT: no scleral icterus.  Mucous membranes are moist,  HEART: Regular rate and rhythm, no murmurs.  LUNGS: Clear to auscultation bilaterally.  No Rales, no rhonchi, no wheezing, no stridor.  ABDOMEN: Soft, nondistended, left-sided tender, no rebound, no rigidity, no guarding.no pulsatile masses. No CVA tenderness  EXTREMITIES: No peripheral edema, no calf tenderness    ED Course     Labs Reviewed   COMP METABOLIC PANEL (14) - Abnormal; Notable for the following components:       Result Value    CO2 20.0 (*)     BUN 90 (*)     Creatinine 11.53 (*)     Calcium, Total 8.6 (*)     Calculated Osmolality 317 (*)     eGFR-Cr 5 (*)     ALT 9 (*)     All other components within normal limits   CBC WITH DIFFERENTIAL WITH PLATELET - Abnormal; Notable for the following components:    RBC 2.66 (*)     HGB 8.7 (*)     HCT 25.2 (*)     All other components within normal limits   LIPASE - Abnormal; Notable for the following components:    Lipase 57 (*)     All other components within normal limits   PROTHROMBIN TIME (PT) - Normal   TYPE AND SCREEN    Narrative:     The following orders were created for panel order Type and screen.  Procedure                               Abnormality         Status                     ---------                               -----------         ------                     ABORH (Blood Type)[[id number]]                               Final result               Antibody Screen[[id number]]                                  Final result                 Please view results for these tests on the individual orders.   ABORH (BLOOD TYPE)   ANTIBODY SCREEN   RAINBOW DRAW LAVENDER   RAINBOW DRAW LIGHT GREEN   RAINBOW DRAW BLUE                   MDM        Differential diagnosis before testing includes but not limited to diverticulitis, diverticular bleed, electrode  abnormality, coagulopathy, which is a medical condition that poses a threat to life/function    Past Medical History/comorbidities-as noted in HPI    Radiographic images  I personally reviewed the radiographs and my individual interpretation shows CT of the abdomen, no free air  I also reviewed the official reports that showed CT, concerning for nonspecific colitis.  Uncomplicated diverticulosis.  Urinary bladder wall thickening.  Small right pleural effusion.    Discussion of management (consult/physicians, social work, pharmacy,ect) GI mary Jacques hospitalist physician    Course of Events during Emergency Room Visit include patient IV established, blood work obtained.  Chemistry sodium 140 potassium 4.6 BUN 90 creatinine 11.53 glucose 94.  CBC white count 6.1 hemoglobin 8.7 platelet 209, on review of medical records patient hemoglobin on 12/21/2024 was 9.6.  CT performed.  Patient was discussed with GI, has not had any episodes of rectal bleeding throughout ER evaluation and is hemodynamically stable.  Will admit for further evaluation, discussed with hospitalist physician.  Discussed all results with the patient he is agrees with plan.    Shared decision making was utilized           Disposition:    Admission  I have discussed with the patient the results of test, differential diagnosis, and treatment plan. They expressed clear understanding of these instructions and agrees to the plan provided.       Note to patient: The 21st Century Cures Act makes medical notes like these available to patients in the interest of transparency. However, this is a medical document intended as peer to peer communication. It is written in medical language and may contain abbreviations or verbiage that are unfamiliar. It may appear blunt or direct. Medical documents are intended to carry relevant information, facts as evident, and the clinical opinion of the practitioner.             Admission disposition: 12/25/2024  6:12  PM           Medical Decision Making      Disposition and Plan     Clinical Impression:  1. Rectal bleeding    2. Anemia, unspecified type    3. ESRD on hemodialysis (HCC)    4. Acute colitis         Disposition:  Admit  12/25/2024  6:12 pm    Follow-up:  No follow-up provider specified.        Medications Prescribed:  Current Discharge Medication List              Supplementary Documentation:         Hospital Problems       Present on Admission  Date Reviewed: 12/20/2024            ICD-10-CM Noted POA    * (Principal) Rectal bleeding K62.5 3/12/2021 Unknown

## 2024-12-30 NOTE — PAYOR COMM NOTE
Discharge Notification    Patient Name: CHU VACA  Payor: [employer]  Subscriber #: [id number]  Authorization Number: [id number]  Admit Date/Time: 12/25/2024 3:39 PM  Discharge Date/Time: 12/26/2024 1:44 PM

## 2024-12-30 NOTE — PAYOR COMM NOTE
--------------  CONTINUED STAY REVIEW    Payor: [employer]  Subscriber #:  [id number]  Authorization Number: [id number]    Admit date: 12/25/24  Admit time:  9:25 PM    REVIEW DOCUMENTATION:12/26    Chief Complaint   Patient presents with    Rectal Bleeding         PCP: Adrian Small MD     History of Present Illness: Patient is a 44 year old male with PMH sig for esrd on hd, bipolar who presented with recurrent rectal bleeding and lower abd discomfort. Similar to previous presentation. Pt started taking fiber recently. Denies any constipation, has had soft stools. No sob.         PMH  Past Medical History       Past Medical History:    Anxiety state    Arrhythmia    Asthma ([facility])    Attention deficit hyperactivity disorder (ADHD)    Back problem    Bipolar 1 disorder ([facility])    CKD (chronic kidney disease) stage 3, GFR 30-59 ml/min ([facility])     Dr Meeks    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[facility])    Congestive heart disease ([facility])    COPD (chronic obstructive pulmonary disease) ([facility])    Coronary atherosclerosis    Deep vein thrombosis ([facility])     at age 17 R/T cast    Depression    Diabetes ([facility])    Dialysis patient ([facility]ly [facility])    Diverticulosis of large intestine    Essential hypertension     3/21 echo: severe concentric LVH with normal EF and no MR or pHTN    Extrinsic asthma, unspecified    Heart attack ([facility])     2016- angiogram- no intervention    Heart valve disease     mitral valve repair in 1994/    High blood pressure    High cholesterol    History of blood transfusion    History of mitral valve repair    Hyperlipidemia    Low back pain     tight and stiff after sweeping and mopping    LVH (left ventricular hypertrophy)    Migraines    Mixed hyperlipidemia      HDL 38 LDL 97 VLDL 57     Monoclonal gammopathy     IgG kappa     Muscle weakness    MVP (mitral valve prolapse)     Repair 1994 at [place]; echoes as recently as 3/21 show mild or trivial MR and no stenosis    Neuropathy    Osteoarthritis      hip ,knees    Pneumonia due to organism    Pulmonary embolism (HCC)    Renal disorder    Stroke (HCC)    TIA (transient ischemic attack)     Initial history of left-sided weakness and slurred speech. (+) cocaine. MRI of the brain, CT angiogram of the head and neck, and 2D echo are all unremarkable.     TMJ (dislocation of temporomandibular joint)    Troponin level elevated     Trop 60 60 47 with TIA and no CP: Lexiscan negative with EF 51    Visual impairment            PSH  Past Surgical History         Past Surgical History:   Procedure Laterality Date    Av fistula revision, open Left      Cabg        Colonoscopy N/A 03/26/2023     Procedure: COLONOSCOPY;  Surgeon: Heath Vu MD;  Location:  ENDOSCOPY    Colonoscopy N/A 12/30/2023     Procedure: COLONOSCOPY with cold snare polypectomy and forcep polypectomy;  Surgeon: Ousmane Suarez MD;  Location:  ENDOSCOPY    Colonoscopy & polypectomy   2019    Egd   2019     Duodenitis. Biopsied. EUS for weight loss was negative    Heart surgery        Hernia surgery   08/17/2022     Dr Barnes    Laminectomy,>2 sgmt,lumbar   09/06/2018     L4-L5 Decomp Discectomy ROEM L4-L5    Mitralplasty w cp bypass   1994     [place]: Repair    Repair rotator cuff,chronic Left       torn and had a ruptured bicep    Sinus surgery          Spine surgery procedure unlisted        Valve repair   1994     mitral valve            ALL:  [Allergies]     [Allergies]       Allergen Reactions    Hydrochlorothiazide RASH and HIVES        Home Medications:  [Medications Taking]    [Medications Taking]         Outpatient Medications Marked as Taking for the 12/25/24 encounter (Hospital Encounter)   Medication Sig Dispense Refill    RENVELA 800 MG Oral Tab Take 3 tablets (2,400 mg total) by mouth 3 (three) times daily with meals.        hydrALAZINE 25 MG Oral Tab Take 1 tablet (25 mg total) by mouth 2 (two) times daily.        losartan 100 MG Oral Tab Take 1 tablet (100 mg total) by mouth daily.  30 tablet 0    gabapentin 100 MG Oral Cap Take 2 capsules (200 mg total) by mouth 3 (three) times daily. 180 capsule 0    NIFEdipine ER 30 MG Oral Tablet 24 Hr Take 1 tablet (30 mg total) by mouth daily. Hold if systolic blood pressure <130 30 tablet 0    VYVANSE 60 MG Oral Cap Take 1 capsule (60 mg total) by mouth every morning.        lamoTRIgine 100 MG Oral Tab Take 1 tablet (100 mg total) by mouth daily.        memantine 5 MG Oral Tab Take 1 tablet (5 mg total) by mouth 2 (two) times daily.        albuterol 108 (90 Base) MCG/ACT Inhalation Aero Soln Inhale 2 puffs into the lungs every 6 (six) hours as needed for Wheezing.        tamsulosin 0.4 MG Oral Cap Take 1 capsule (0.4 mg total) by mouth daily.        ARIPiprazole 15 MG Oral Tab Take 1 tablet (15 mg total) by mouth daily.        buPROPion  MG Oral Tablet 24 Hr Take 1 tablet (150 mg total) by mouth daily.        FLUoxetine HCl 40 MG Oral Cap Take 1 capsule (40 mg total) by mouth daily. 7 capsule 0    carvedilol 25 MG Oral Tab Take 1 tablet (25 mg total) by mouth in the morning and 1 tablet (25 mg total) in the evening. Take with meals. 60 tablet 6    Fenofibrate 134 MG Oral Cap Take 1 capsule by mouth nightly. 90 capsule 1           Soc Hx  Social History            Tobacco Use    Smoking status: Former       Current packs/day: 0.00       Average packs/day: 1 pack/day for 27.0 years (27.0 ttl pk-yrs)       Types: Cigarettes       Start date: 1995       Quit date: 2022       Years since quittin.8       Passive exposure: Never    Smokeless tobacco: Never   Substance Use Topics    Alcohol use: No         Fam Hx  Family History         Family History   Problem Relation Age of Onset    Hypertension Father      Alcohol and Other Disorders Associated Father      Substance Abuse Father           cocaine    Dementia Father      Cancer Father           lung    Diabetes Mother      Cancer Mother           multiple myeloma    Hypertension Mother       Anxiety Maternal Aunt      Depression Maternal Aunt      Anxiety Maternal Aunt      Depression Maternal Aunt      Bipolar Disorder Maternal Aunt      Diabetes Maternal Grandmother      Hypertension Maternal Grandmother      Cancer Maternal Grandfather           stomach cancer    Diabetes Maternal Grandfather      Hypertension Maternal Grandfather      Alcohol and Other Disorders Associated Maternal Grandfather      Hypertension Paternal Grandmother      Hypertension Paternal Grandfather      Cancer Sister           uterine and ovarian    Hypertension Sister      Cancer Maternal Uncle           lung    Cancer Paternal Aunt           throat            Review of Systems  Comprehensive ROS reviewed and negative except for what's stated above.         OBJECTIVE:  BP (!) 158/98 (BP Location: Right arm)   Pulse 82   Temp 97.6 °F (36.4 °C) (Oral)   Resp 18   Ht 6' 2\" (1.88 m)   Wt 240 lb (108.9 kg)   SpO2 98%   BMI 30.81 kg/m²      Gen- NAD, appears stated age  HEENT- NCAT, anicteric sclera, MMM, OP clear  Lymph- no cervical LAD  CV- RRR no murmurs. No PAMELA  Lungs- CTAB, good respiratory effort  Abd- soft, mild ttp lower quadrants  Derm- no rashes  MSK- good muscle strength and tone, no joint swelling  Neuro- A&OX3, no focal deficits        Diagnostic Data:    CBC/Chem          Recent Labs   Lab 12/20/24  0109 12/20/24  0634 12/21/24  0633 12/25/24  1624 12/26/24  0656   WBC  --  6.2 6.2 6.1 5.3   HGB 9.0* 9.3* 9.6* 8.7* 8.3*   MCV  --  93.3 93.5 94.7 94.3   PLT  --  206.0 223.0 209.0 183.0   INR  --   --   --  1.11  --                 Recent Labs   Lab 12/20/24  0634 12/21/24  0633 12/25/24  1624 12/26/24  0657    138 140 141   K 3.9 4.3 4.6 4.3    105 108 109   CO2 24.0 25.0 20.0* 18.0*   BUN 42* 28* 90* 90*   CREATSERUM 8.79* 6.99* 11.53* 11.06*   GLU 94 100* 94 82   CA 8.9 9.2 8.6* 8.5*   MG  --   --   --  2.1   PHOS 5.2* 5.2*  --   --                Recent Labs   Lab 12/20/24  0634 12/21/24  0633  12/25/24  1624   ALT  --   --  9*   AST  --   --  25   ALB 4.0 4.0 4.2         No results for input(s): \"TROP\" in the last 168 hours.           Radiology: CT ABDOMEN+PELVIS(CONTRAST ONLY)(CPT=74177)     Result Date: 12/25/2024  PROCEDURE:  CT ABDOMEN+PELVIS (CONTRAST ONLY) (CPT=74177)  COMPARISON:  [facility], CT, CT ABDOMEN+PELVIS(CPT=74176), 11/16/2024, 2:33 PM.  INDICATIONS:  rectal bleeding  TECHNIQUE:  CT scanning was performed from the dome of the diaphragm to the pubic symphysis with non-ionic intravenous contrast material. Post contrast coronal MPR imaging was performed.  Dose reduction techniques were used. Dose information is transmitted to the ACR (American College of Radiology) NRDR (National Radiology Data Registry) which includes the Dose Index Registry.  PATIENT STATED HISTORY:(As transcribed by Technologist)  Rectal bleeding   CONTRAST USED:  100cc of Isovue 370  FINDINGS: LUNG BASE:  Partially imaged scarring in the right lower lung with associated pleural thickening.  Small right pleural effusion.  Mitral valve prosthesis noted.  Elevation of the right diaphragm. LIVER:  Unremarkable. BILIARY:  Unremarkable. SPLEEN:  Unremarkable. PANCREAS:  Unremarkable. ADRENALS:  Nonspecific adrenal thickening KIDNEYS:  Subcentimeter hypodensities in the kidneys measuring up to 8 mm are too small to further characterize and warrant no specific follow-up. AORTA/VASCULAR:  Unremarkable. RETROPERITONEUM:  Unremarkable. BOWEL/MESENTERY:  There is mild diffuse colonic wall thickening that is concerning for nonspecific colitis.  Clinical correlation recommended.  Uncomplicated colonic diverticulosis.  Scattered feces in the colon.  No large or small bowel dilatation.  No free air or free fluid. ABDOMINAL WALL:  Unremarkable. PELVIC ORGANS:  Urinary bladder wall thickening with fatty induration is concerning for cystitis.  Clinical correlation recommended.  Prostate calcification.  Pelvic phleboliths. LYMPH NODES:  No  lymphadenopathy in the abdomen or pelvis. BONES:  Degenerative changes in the spine OTHER:  None.             CONCLUSION:   1. Findings concerning for nonspecific colitis.  Clinical correlation recommended.  2. Uncomplicated colonic diverticulosis.  3. Urinary bladder wall thickening with fatty induration is concerning for cystitis.  Clinical correlation recommended.   4. Small right pleural effusion.  Please see above for further details.  LOCATION:  [facility]   Dictated by (CST): Jj Conley MD on 12/25/2024 at 5:33 PM     Finalized by (CST): Jj Conley MD on 12/25/2024 at 5:36 PM        NM GI BLOOD LOSS STUDY SPECT/CT (SINGLE) 1 DAY (CPT=78278/05860)     Result Date: 12/19/2024  PROCEDURE:  NM GI BLOOD LOSS STUDY SPECT/CT(SINGLE) 1 DAY (CPT=78278/01581)  COMPARISON:  None.  INDICATION:  Bright red blood per rectum  TECHNIQUE:  26.1 mCi Tc99m autologously labelled RBC's were re-injected intravenously, and dynamic images of the abdomen were obtained in the anterior projection for at least one hour. Additional dedicated SPECT images were taken with concurrent CT scan for both anatomical localization as well as attenuation correction. Scan was reformatted into multi-planar reconstructions on a dedicated workstation.  FINDINGS:  No areas of abnormal radiotracer uptake are seen.  No radiotracer uptake seen within the expected region of the bowel.  There is some questionable wall thickening of the urinary bladder although some of this may be due to incomplete distension.  If clinical symptoms persist then consider follow-up imaging or direct visualization if not already performed.             CONCLUSION:  See above.   LOCATION:  [id number]      Dictated by (CST): Smith Randle MD on 12/19/2024 at 5:36 PM     Finalized by (CST): Smith Randle MD on 12/19/2024 at 5:38 PM        XR CHEST AP PORTABLE  (CPT=71045)     Result Date: 12/17/2024  PROCEDURE:  XR CHEST AP PORTABLE  (CPT=71045)  TECHNIQUE:  AP chest radiograph was  obtained.  COMPARISON:  [facility] , XR, XR CHEST AP PORTABLE  (CPT=71045), 12/02/2024, 6:51 PM.  INDICATIONS:  rectal bleeding  PATIENT STATED HISTORY: (As transcribed by Technologist)  Patient states nausea and weakness for 2 days.    FINDINGS:  Right internal jugular split tip hemodialysis catheter is in stable position.  Confluent opacity right lung base is consistent with right effusion and atelectasis and is unchanged.  Cardiac valve prosthesis is stable.  Heart size is within normal limits.  Mediastinum and elenita are unremarkable.  Chest wall structures are unremarkable.             CONCLUSION:  1. Right dialysis catheter is stable. 2. Confluent opacity right lung base is consistent with effusion and atelectasis or pneumonia.   LOCATION:  [facility]      Dictated by (CST): Adrian Blevins MD on 12/17/2024 at 11:57 AM     Finalized by (CST): Adrian Blevins MD on 12/17/2024 at 11:58 AM        MRI CERVICAL+THORACIC+LUMBAR SPINE  (CPT=72141/42123/64239)     Result Date: 12/6/2024  PROCEDURE:  MRI CERVICAL+THORACIC+LUMBAR SPINE (CPT=72141/94183/75387)  COMPARISON:  [facility] , MR, MRI SPINE CERVICAL (CPT=72141), 10/19/2024, 8:25 PM.  INDICATIONS:  Back pain  TECHNIQUE:  A comprehensive examination was performed utilizing a variety of imaging planes and imaging parameters to optimize visualization of suspected pathology.  Images were performed without intravenous contrast.  PATIENT STATED HISTORY: (As transcribed by Technologist)  Pt stated that he developed lower back pain that radiates down his legs.    FINDINGS:  Please note that the exam is somewhat limited due to motion.  Cervical spine:  There is straightening of the normal cervical lordosis.  No significant spondylolisthesis is present.  Vertebral bodies appear maintained in height.  Intervertebral disc spaces appear overall maintained.  No masses or fluid collections are present within the cervical spinal canal.  The cervical spinal cord is normal in course and caliber.   No areas of cord signal abnormality are seen.  Posterior disc osteophyte complex noted at C5-6 and C6-7 with overall mild areas of central canal stenosis present.  This is similar to the prior exam accounting for differences in technique.  Overall mild left neural foraminal stenosis is favored at C5-6.  Paravertebral soft tissues are unremarkable in appearance.  Thoracic spine:  The exam is significantly limited due to the degree of motion.  There is normal thoracic kyphosis.  No significant spondylolisthesis is present.  Vertebral bodies appear maintained in height.  No definite cord signal abnormality is seen.  Within the limitations of the exam no significant central canal or neural foraminal stenosis is seen within the thoracic spine.  The visualized paravertebral soft tissues are unremarkable in appearance.  Lumbar spine:  There is normal lumbar lordosis.  No significant spondylolisthesis is present.  Vertebral bodies appear maintained in height.  Moderate intervertebral disc space narrowing with diffuse disc desiccation noted at L4-5 and L5-S1.  The distal spinal cord is normal in course and caliber.  No areas of cord signal abnormality are seen.  No evidence of significant central canal or neural foraminal stenosis present at L1-2, L2-3.  At L3-4 there is a minimal diffuse disc bulge with mild facet arthropathy resulting in overall minimal central canal stenosis.  No significant neural foraminal stenosis is seen.  At L4-5 postsurgical changes of prior laminectomy are noted.  A moderate diffuse disc bulge is present.  Overall mild bilateral neural foraminal stenosis is favored.  No significant central canal stenosis is seen.  There is some clumping of nerve roots bilaterally in this region which may represent arachnoiditis.  L5-S1 there is a mild diffuse disc bulge present with mild facet arthropathy.  No significant central canal stenosis is seen.  There is no significant neural foraminal stenosis.   Paravertebral soft tissues are unremarkable in appearance.              CONCLUSION:  Postsurgical changes of presumed prior laminectomy noted at L4-5 with a moderate diffuse disc bulge.  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  There is mild central canal stenosis noted at C5-6 and C6-7 with mild left neural foraminal stenosis favored at C5-6.     LOCATION:  [facility]   Dictated by (CST): Smith Randle MD on 12/06/2024 at 7:31 AM     Finalized by (CST): Smith Randle MD on 12/06/2024 at 8:25 AM        XR CHEST AP PORTABLE  (CPT=71045)     Result Date: 12/2/2024  PROCEDURE:  XR CHEST AP PORTABLE  (CPT=71045)  TECHNIQUE:  AP chest radiograph was obtained.  COMPARISON:  [facility], XR, XR CHEST AP PORTABLE  (CPT=71045), 11/06/2024, 12:20 PM.  INDICATIONS:  Diaphoresis  PATIENT STATED HISTORY: (As transcribed by Technologist)  Patient offered no additional history at this time.    FINDINGS:  Support devices appear overall stable.  Small right pleural effusion is favored.  A background of mild vascular congestion and volume overload is likely present.  Consolidation at the right lung base is difficult to entirely exclude.  If there  is persistent concern then consider follow-up imaging.             CONCLUSION:  See above.   LOCATION:  [id number]      Dictated by (CST): Smith Randle MD on 12/02/2024 at 7:46 PM     Finalized by (CST): Smith Randle MD on 12/02/2024 at 7:48 PM           Available outpatient records reviewed--     ASSESSMENT / PLAN:      44 year old male with PMH sig for TIA, ESRD on HD, HFpEF, HTN, T2DM, CAD, COPD, PE who presents for GI bleed.     Hematochezia - intermittent  Diverticular bleed?/Diverticulitis?  - Recent EGD and colonoscopy reportedly normal in August 2024, normal capsule study in October 2024  - Tagged RBC scan negative 12/19/24  - Suspect etiology to be secondary to chronic recurrent  hemorrhoidal moreso than diverticular bleed. CT on this admit similar to previous with nonspecific colitis  - GI consulted and signed off, no further intervention. Rec fiber, gentle perianal care     #ESRD on hemodialysis  - Resume normal dialysis schedule     #HTN  -continue Losartan, Coreg, Hydral, Nifedipine     #Cervical radiculopathy  -Pain control as needed  -Follow-up with spine surgeon     # Bipolar disorder  -Continue Abilify, bupropion, Lamictal     # Depression  -Continue fluoxetine     #COPD, chronic  -Continue home inhalers     #chronic diastolic HF- noted        Discussed with GI, nephrology, and patient. He is okay with dc home today. Will f/u with outpt hd tomorrow.         MEDICATIONS ADMINISTERED IN LAST 1 DAY:  Administration History       None            Vitals (last day) before discharge       Date/Time Temp Pulse Resp BP SpO2 Weight O2 Device O2 Flow Rate (L/min) [facility]    12/26/24 1153 98.2 °F (36.8 °C) -- 18 -- 98 % -- None (Room air) --     12/26/24 1153 -- 86 -- 148/90 -- -- -- -- SG    12/26/24 1023 -- -- -- -- -- 240 lb (108.9 kg) -- -- DJ    12/26/24 0756 97.6 °F (36.4 °C) 82 18 158/98 98 % -- None (Room air) --     12/26/24 0422 97.7 °F (36.5 °C) 90 -- 167/108 95 % -- None (Room air) -- PW    12/26/24 0000 97.6 °F (36.4 °C) 85 -- 159/109 -- -- None (Room air) -- PW    12/25/24 2011 97.7 °F (36.5 °C) 90 18 155/105 94 % -- None (Room air) -- MS    12/25/24 1928 -- 96 20 156/111 95 % -- None (Room air) -- KK    12/25/24 1823 -- 89 20 136/87 96 % -- None (Room air) --     12/25/24 1604 -- 90 20 150/107 94 % -- None (Room air) --     12/25/24 1437 97.6 °F (36.4 °C) 97 22 137/90 96 % 240 lb (108.9 kg) None (Room air) -- LM

## 2025-01-06 ENCOUNTER — HOSPITAL ENCOUNTER (EMERGENCY)
Age: 45
Discharge: HOME | End: 2025-01-06
Attending: EMERGENCY MEDICINE
Payer: MEDICARE

## 2025-01-06 ENCOUNTER — APPOINTMENT (OUTPATIENT)
Dept: CT IMAGING | Age: 45
End: 2025-01-06
Attending: EMERGENCY MEDICINE
Payer: MEDICARE

## 2025-01-06 VITALS
RESPIRATION RATE: 18 BRPM | HEIGHT: 74 IN | BODY MASS INDEX: 31.44 KG/M2 | WEIGHT: 245 LBS | HEART RATE: 80 BPM | SYSTOLIC BLOOD PRESSURE: 186 MMHG | TEMPERATURE: 98 F | OXYGEN SATURATION: 98 % | DIASTOLIC BLOOD PRESSURE: 123 MMHG

## 2025-01-06 DIAGNOSIS — Z99.2 STAGE 5 CHRONIC KIDNEY DISEASE ON CHRONIC DIALYSIS (HCC): ICD-10-CM

## 2025-01-06 DIAGNOSIS — K52.9 COLITIS: Primary | ICD-10-CM

## 2025-01-06 DIAGNOSIS — N18.6 STAGE 5 CHRONIC KIDNEY DISEASE ON CHRONIC DIALYSIS (HCC): ICD-10-CM

## 2025-01-06 LAB
ALBUMIN SERPL-MCNC: 4.3 G/DL (ref 3.2–4.8)
ALBUMIN/GLOB SERPL: 1.3 {RATIO} (ref 1–2)
ALP LIVER SERPL-CCNC: 84 U/L
ALT SERPL-CCNC: 11 U/L
ANION GAP SERPL CALC-SCNC: 12 MMOL/L (ref 0–18)
AST SERPL-CCNC: 28 U/L (ref ?–34)
ATRIAL RATE: 87 BPM
BASOPHILS # BLD AUTO: 0.12 X10(3) UL (ref 0–0.2)
BASOPHILS NFR BLD AUTO: 1.5 %
BILIRUB SERPL-MCNC: 0.3 MG/DL (ref 0.3–1.2)
BUN BLD-MCNC: 100 MG/DL (ref 9–23)
CALCIUM BLD-MCNC: 9.2 MG/DL (ref 8.7–10.4)
CHLORIDE SERPL-SCNC: 107 MMOL/L (ref 98–112)
CO2 SERPL-SCNC: 20 MMOL/L (ref 21–32)
CREAT BLD-MCNC: 14.19 MG/DL
EGFRCR SERPLBLD CKD-EPI 2021: 4 ML/MIN/1.73M2 (ref 60–?)
EOSINOPHIL # BLD AUTO: 0.34 X10(3) UL (ref 0–0.7)
EOSINOPHIL NFR BLD AUTO: 4.2 %
ERYTHROCYTE [DISTWIDTH] IN BLOOD BY AUTOMATED COUNT: 14.3 %
GLOBULIN PLAS-MCNC: 3.4 G/DL (ref 2–3.5)
GLUCOSE BLD-MCNC: 133 MG/DL (ref 70–99)
HCT VFR BLD AUTO: 31.4 %
HGB BLD-MCNC: 11.1 G/DL
IMM GRANULOCYTES # BLD AUTO: 0.03 X10(3) UL (ref 0–1)
IMM GRANULOCYTES NFR BLD: 0.4 %
LIPASE SERPL-CCNC: 77 U/L (ref 12–53)
LYMPHOCYTES # BLD AUTO: 1.56 X10(3) UL (ref 1–4)
LYMPHOCYTES NFR BLD AUTO: 19.3 %
MCH RBC QN AUTO: 32.4 PG (ref 26–34)
MCHC RBC AUTO-ENTMCNC: 35.4 G/DL (ref 31–37)
MCV RBC AUTO: 91.5 FL
MONOCYTES # BLD AUTO: 0.83 X10(3) UL (ref 0.1–1)
MONOCYTES NFR BLD AUTO: 10.3 %
NEUTROPHILS # BLD AUTO: 5.2 X10 (3) UL (ref 1.5–7.7)
NEUTROPHILS # BLD AUTO: 5.2 X10(3) UL (ref 1.5–7.7)
NEUTROPHILS NFR BLD AUTO: 64.3 %
OSMOLALITY SERPL CALC.SUM OF ELEC: 321 MOSM/KG (ref 275–295)
P AXIS: 37 DEGREES
P-R INTERVAL: 198 MS
PLATELET # BLD AUTO: 386 10(3)UL (ref 150–450)
POTASSIUM SERPL-SCNC: 3.8 MMOL/L (ref 3.5–5.1)
PROT SERPL-MCNC: 7.7 G/DL (ref 5.7–8.2)
Q-T INTERVAL: 420 MS
QRS DURATION: 96 MS
QTC CALCULATION (BEZET): 505 MS
R AXIS: 26 DEGREES
RBC # BLD AUTO: 3.43 X10(6)UL
SODIUM SERPL-SCNC: 139 MMOL/L (ref 136–145)
T AXIS: 83 DEGREES
VENTRICULAR RATE: 87 BPM
WBC # BLD AUTO: 8.1 X10(3) UL (ref 4–11)

## 2025-01-06 PROCEDURE — 85025 COMPLETE CBC W/AUTO DIFF WBC: CPT | Performed by: EMERGENCY MEDICINE

## 2025-01-06 PROCEDURE — 99285 EMERGENCY DEPT VISIT HI MDM: CPT

## 2025-01-06 PROCEDURE — 80053 COMPREHEN METABOLIC PANEL: CPT

## 2025-01-06 PROCEDURE — 93010 ELECTROCARDIOGRAM REPORT: CPT

## 2025-01-06 PROCEDURE — 74176 CT ABD & PELVIS W/O CONTRAST: CPT | Performed by: EMERGENCY MEDICINE

## 2025-01-06 PROCEDURE — 80053 COMPREHEN METABOLIC PANEL: CPT | Performed by: EMERGENCY MEDICINE

## 2025-01-06 PROCEDURE — 83690 ASSAY OF LIPASE: CPT

## 2025-01-06 PROCEDURE — 83690 ASSAY OF LIPASE: CPT | Performed by: EMERGENCY MEDICINE

## 2025-01-06 PROCEDURE — 85025 COMPLETE CBC W/AUTO DIFF WBC: CPT

## 2025-01-06 PROCEDURE — 96374 THER/PROPH/DIAG INJ IV PUSH: CPT

## 2025-01-06 PROCEDURE — 93005 ELECTROCARDIOGRAM TRACING: CPT

## 2025-01-06 RX ORDER — GABAPENTIN 100 MG/1
200 CAPSULE ORAL 3 TIMES DAILY
Qty: 180 CAPSULE | Refills: 0 | OUTPATIENT
Start: 2025-01-06

## 2025-01-06 RX ORDER — ACETAMINOPHEN 500 MG
1000 TABLET ORAL ONCE
Status: COMPLETED | OUTPATIENT
Start: 2025-01-06 | End: 2025-01-06

## 2025-01-06 RX ORDER — ACETAMINOPHEN 500 MG
1000 TABLET ORAL ONCE
Status: DISCONTINUED | OUTPATIENT
Start: 2025-01-06 | End: 2025-01-06

## 2025-01-06 RX ORDER — KETOROLAC TROMETHAMINE 30 MG/ML
30 INJECTION, SOLUTION INTRAMUSCULAR; INTRAVENOUS ONCE
Status: COMPLETED | OUTPATIENT
Start: 2025-01-06 | End: 2025-01-06

## 2025-01-06 NOTE — ED INITIAL ASSESSMENT (HPI)
Dialysis pt to ER for LLQ pain and 2 episodes of bloody stools since 10pm last night. Pt sts he received 2 units of blood last Tuesday at [facility]. No vomiting.   Benefits, risks, and possible complications of procedure explained to patient/caregiver who verbalized understanding and gave verbal consent.

## 2025-01-06 NOTE — TELEPHONE ENCOUNTER
Refused medication patient is followed by neurology in [place]      Medication: Gabapentin 100 mg      Date of last refill: 12/07/2024 (#180/0)  [place]     Last office visit: Seen by neurology in [place] seen at [facility] on 12/7/24  Due back to clinic per last office note:    Date next office visit scheduled:    No future appointments.        Last OV note recommendation:

## 2025-01-06 NOTE — ED PROVIDER NOTES
Patient Seen in: [facility] In [place]      History     Chief Complaint   Patient presents with    Abdomen/Flank Pain    Chest Pain Angina     Stated Complaint: rectal bleeding, abd pain, cp    Subjective:     HPI    44-year-old male with history of diabetes, hypertension, asthma, anxiety/depression, renal failure/dialysis. Reports experiencing abdominal pain on the left side, which is worsening. Currently on dialysis, with the last session on Saturday. No mention of breathing difficulties.     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[facility])    Congestive heart disease ([facility])    COPD (chronic obstructive pulmonary disease) ([facility])    Coronary atherosclerosis    Deep vein thrombosis ([facility])    at age 17 R/T cast    Depression    Diabetes ([facility])    Dialysis patient ([facility])    Diverticulosis of large intestine    Essential hypertension    3/21 echo: severe concentric LVH with normal EF and no MR or pHTN    Extrinsic asthma, unspecified    Heart attack ([facility])    2016- angiogram- no intervention    Heart valve disease    mitral valve repair in 1994/    High blood pressure    High cholesterol    History of blood transfusion    History of mitral valve repair    Hyperlipidemia    Low back pain    tight and stiff after sweeping and mopping    LVH (left ventricular hypertrophy)    Migraines    Mixed hyperlipidemia     HDL 38 LDL 97 VLDL 57     Monoclonal gammopathy    IgG kappa     Muscle weakness    MVP (mitral valve prolapse)    Repair 1994 at [place]; echoes as recently as 3/21 show mild or trivial MR and no stenosis    Neuropathy    Osteoarthritis    hip ,knees    Pneumonia due to organism    Pulmonary embolism (HCC)    Renal disorder    Stroke ([facility])    TIA  (transient ischemic attack)    Initial history of left-sided weakness and slurred speech. (+) cocaine. MRI of the brain, CT angiogram of the head and neck, and 2D echo are all unremarkable.     TMJ (dislocation of temporomandibular joint)    Troponin level elevated    Trop 60 60 47 with TIA and no CP: Lexiscan negative with EF 51    Visual impairment              Past Surgical History:   Procedure Laterality Date    Av fistula revision, open Left     Cabg      Colonoscopy N/A 2023    Procedure: COLONOSCOPY;  Surgeon: Heath Vu MD;  Location:  ENDOSCOPY    Colonoscopy N/A 2023    Procedure: COLONOSCOPY with cold snare polypectomy and forcep polypectomy;  Surgeon: Ousmane Suarez MD;  Location:  ENDOSCOPY    Colonoscopy & polypectomy      Egd  2019    Duodenitis. Biopsied. EUS for weight loss was negative    Heart surgery      Hernia surgery  2022    Dr Barnes    Laminectomy,>2 sgmt,lumbar  2018    L4-L5 Decomp Discectomy ROEM L4-L5    Mitralplasty w cp bypass      [place]: Repair    Repair rotator cuff,chronic Left     torn and had a ruptured bicep    Sinus surgery        Spine surgery procedure unlisted      Valve repair      mitral valve                Social History     Socioeconomic History    Marital status:    Tobacco Use    Smoking status: Former     Current packs/day: 0.00     Average packs/day: 1 pack/day for 27.0 years (27.0 ttl pk-yrs)     Types: Cigarettes     Start date: 1995     Quit date: 2022     Years since quittin.8     Passive exposure: Never    Smokeless tobacco: Never   Vaping Use    Vaping status: Never Used   Substance and Sexual Activity    Alcohol use: No    Drug use: No     Social Drivers of Health     Financial Resource Strain: Medium Risk (2024)    Received from [facility]    Overall Financial Resource Strain (CARDIA)     Difficulty of Paying Living Expenses: Somewhat hard   Food Insecurity: No Food Insecurity  (12/25/2024)    Food Insecurity     Food Insecurity: Never true   Transportation Needs: No Transportation Needs (12/25/2024)    Transportation Needs     Lack of Transportation: No   Physical Activity: Inactive (5/7/2024)    Received from [facility]    Exercise Vital Sign     Days of Exercise per Week: 0 days     Minutes of Exercise per Session: 0 min   Stress: Stress Concern Present (5/7/2024)    Received from [facility]    [employer] [place] [employer] - Occupational Stress Questionnaire     Feeling of Stress : Rather much   Social Connections: Unknown (5/7/2024)    Received from [facility]    Social Connection and Isolation Panel [NHANES]     Frequency of Communication with Friends and Family: More than three times a week     Frequency of Social Gatherings with Friends and Family: More than three times a week     Attends [employer] Services: Never     Active Member of Clubs or Organizations: No     Attends Club or Organization Meetings: Never     Marital Status: Patient unable to answer   Housing Stability: Low Risk  (12/25/2024)    Housing Stability     Housing Instability: No              Review of Systems    Positive for stated complaint: rectal bleeding, abd pain, cp  Other systems are as noted in HPI.  Constitutional and vital signs reviewed.      All other systems reviewed and negative except as noted above.    Physical Exam     ED Triage Vitals   BP 01/06/25 0538 (!) 199/136   Pulse 01/06/25 0538 85   Resp 01/06/25 0538 20   Temp 01/06/25 0538 97.8 °F (36.6 °C)   Temp src 01/06/25 0538 Oral   SpO2 01/06/25 0538 96 %   O2 Device 01/06/25 0654 None (Room air)       Current:BP (!) 186/123   Pulse 80   Temp 97.8 °F (36.6 °C) (Oral)   Resp 18   Ht 188 cm (6' 2\")   Wt 111.1 kg   SpO2 98%   BMI 31.46 kg/m²     General:  Vitals as listed.  No acute distress   HEENT: Sclerae anicteric.  Conjunctivae show no pallor.  Oropharynx clear, mucous membranes moist   Lungs: good  air exchange  Abdomen: Mild left abdominal tenderness.  No abdominal masses.  No peritoneal signs   Extremities: no edema, normal peripheral pulses   Neuro: Alert oriented and nonfocal      ED Course     Labs Reviewed   CBC WITH DIFFERENTIAL WITH PLATELET - Abnormal; Notable for the following components:       Result Value    RBC 3.43 (*)     HGB 11.1 (*)     HCT 31.4 (*)     All other components within normal limits   COMP METABOLIC PANEL (14) - Abnormal; Notable for the following components:    Glucose 133 (*)     CO2 20.0 (*)      (*)     Creatinine 14.19 (*)     Calculated Osmolality 321 (*)     eGFR-Cr 4 (*)     All other components within normal limits   LIPASE - Abnormal; Notable for the following components:    Lipase 77 (*)     All other components within normal limits   RAINBOW DRAW BLUE     CT ABDOMEN+PELVIS(CPT=74176)    Result Date: 1/6/2025  CONCLUSION:  There is some questionable circumferential mural thickening of the colon.  This is better appreciated on the prior exam.  Some of this may be due to incomplete distention and lack of contrast.  A nonspecific colitis is difficult to exclude.  Probable circumferential mural thickening of the urinary bladder may represent cystitis.   LOCATION:  [id number]   Dictated by (CST): Smith Randle MD on 1/06/2025 at 6:47 AM     Finalized by (CST): Smith Randle MD on 1/06/2025 at 6:54 AM      EKG    Rate, intervals and axes as noted on EKG Report.  Rate: 87  Rhythm: Sinus Rhythm  Reading: No acute ST-T changes           Labs consistent with baseline as well as improvement in hemoglobin after transfusion recently    ED COURSE and MDM       Differential diagnosis before testing included colitis versus ruptured abdominal arctic aneurysm, a medical condition that poses a threat to life.    I reviewed prior external notes including echocardiogram done 11/17/2024 that showed an ejection fraction of 60 to 65%    Given Toradol for pain control.  He was also given  Tylenol.    Note that the patient has an emergency department management plan due to frequent ED use for medication refills.  Strongly encouraged that he follow-up with his primary care physician.    I have discussed with the patient the results of testing, differential diagnosis, and treatment plan. They expressed clear understanding of these instructions and agrees to the plan provided.    Disposition and Plan     Clinical Impression:  1. Colitis    2. Stage 5 chronic kidney disease on chronic dialysis (HCC)         Disposition:  Discharge  1/6/2025  7:27 am    Follow-up:  Adrian Arce MD  [address] [facility] [address]  [phone]    Schedule an appointment as soon as possible for a visit in 3 day(s)          Medications Prescribed:  Current Discharge Medication List

## 2025-01-07 ENCOUNTER — HOSPITAL ENCOUNTER (EMERGENCY)
Age: 45
Discharge: HOME | End: 2025-01-07
Attending: EMERGENCY MEDICINE
Payer: MEDICARE

## 2025-01-07 ENCOUNTER — APPOINTMENT (OUTPATIENT)
Dept: GENERAL RADIOLOGY | Age: 45
End: 2025-01-07
Attending: EMERGENCY MEDICINE
Payer: MEDICARE

## 2025-01-07 VITALS
SYSTOLIC BLOOD PRESSURE: 174 MMHG | BODY MASS INDEX: 31.4 KG/M2 | WEIGHT: 244.69 LBS | HEART RATE: 86 BPM | OXYGEN SATURATION: 96 % | HEIGHT: 74 IN | RESPIRATION RATE: 19 BRPM | DIASTOLIC BLOOD PRESSURE: 104 MMHG | TEMPERATURE: 98 F

## 2025-01-07 DIAGNOSIS — N18.6 ESRD ON HEMODIALYSIS (HCC): ICD-10-CM

## 2025-01-07 DIAGNOSIS — Z99.2 ESRD ON HEMODIALYSIS (HCC): ICD-10-CM

## 2025-01-07 DIAGNOSIS — K62.5 RECTAL BLEEDING: Primary | ICD-10-CM

## 2025-01-07 DIAGNOSIS — Z91.199 PERSONAL HISTORY OF NONCOMPLIANCE WITH MEDICAL TREATMENT: ICD-10-CM

## 2025-01-07 LAB
ANION GAP SERPL CALC-SCNC: 14 MMOL/L (ref 0–18)
BASOPHILS # BLD AUTO: 0.11 X10(3) UL (ref 0–0.2)
BASOPHILS NFR BLD AUTO: 1.6 %
BUN BLD-MCNC: 101 MG/DL (ref 9–23)
CALCIUM BLD-MCNC: 9 MG/DL (ref 8.7–10.4)
CHLORIDE SERPL-SCNC: 106 MMOL/L (ref 98–112)
CO2 SERPL-SCNC: 19 MMOL/L (ref 21–32)
CREAT BLD-MCNC: 14.04 MG/DL
EGFRCR SERPLBLD CKD-EPI 2021: 4 ML/MIN/1.73M2 (ref 60–?)
EOSINOPHIL # BLD AUTO: 0.24 X10(3) UL (ref 0–0.7)
EOSINOPHIL NFR BLD AUTO: 3.5 %
ERYTHROCYTE [DISTWIDTH] IN BLOOD BY AUTOMATED COUNT: 14.2 %
GLUCOSE BLD-MCNC: 189 MG/DL (ref 70–99)
HCT VFR BLD AUTO: 31.3 %
HGB BLD-MCNC: 10.8 G/DL
IMM GRANULOCYTES # BLD AUTO: 0.02 X10(3) UL (ref 0–1)
IMM GRANULOCYTES NFR BLD: 0.3 %
LYMPHOCYTES # BLD AUTO: 0.79 X10(3) UL (ref 1–4)
LYMPHOCYTES NFR BLD AUTO: 11.4 %
MCH RBC QN AUTO: 31.7 PG (ref 26–34)
MCHC RBC AUTO-ENTMCNC: 34.5 G/DL (ref 31–37)
MCV RBC AUTO: 91.8 FL
MONOCYTES # BLD AUTO: 0.63 X10(3) UL (ref 0.1–1)
MONOCYTES NFR BLD AUTO: 9.1 %
NEUTROPHILS # BLD AUTO: 5.11 X10 (3) UL (ref 1.5–7.7)
NEUTROPHILS # BLD AUTO: 5.11 X10(3) UL (ref 1.5–7.7)
NEUTROPHILS NFR BLD AUTO: 74.1 %
OSMOLALITY SERPL CALC.SUM OF ELEC: 325 MOSM/KG (ref 275–295)
PLATELET # BLD AUTO: 347 10(3)UL (ref 150–450)
POTASSIUM SERPL-SCNC: 3.8 MMOL/L (ref 3.5–5.1)
RBC # BLD AUTO: 3.41 X10(6)UL
SODIUM SERPL-SCNC: 139 MMOL/L (ref 136–145)
WBC # BLD AUTO: 6.9 X10(3) UL (ref 4–11)

## 2025-01-07 PROCEDURE — 99285 EMERGENCY DEPT VISIT HI MDM: CPT

## 2025-01-07 PROCEDURE — 93010 ELECTROCARDIOGRAM REPORT: CPT

## 2025-01-07 PROCEDURE — 96374 THER/PROPH/DIAG INJ IV PUSH: CPT

## 2025-01-07 PROCEDURE — 85025 COMPLETE CBC W/AUTO DIFF WBC: CPT | Performed by: EMERGENCY MEDICINE

## 2025-01-07 PROCEDURE — 80048 BASIC METABOLIC PNL TOTAL CA: CPT | Performed by: EMERGENCY MEDICINE

## 2025-01-07 PROCEDURE — 93005 ELECTROCARDIOGRAM TRACING: CPT

## 2025-01-07 PROCEDURE — 71045 X-RAY EXAM CHEST 1 VIEW: CPT | Performed by: EMERGENCY MEDICINE

## 2025-01-07 RX ORDER — ACETAMINOPHEN 500 MG
1000 TABLET ORAL ONCE
Status: COMPLETED | OUTPATIENT
Start: 2025-01-07 | End: 2025-01-07

## 2025-01-07 RX ORDER — LIDOCAINE 50 MG/G
1 PATCH TOPICAL EVERY 24 HOURS
Qty: 30 PATCH | Refills: 0 | Status: ON HOLD | OUTPATIENT
Start: 2025-01-07

## 2025-01-07 RX ORDER — KETOROLAC TROMETHAMINE 15 MG/ML
15 INJECTION, SOLUTION INTRAMUSCULAR; INTRAVENOUS ONCE
Status: COMPLETED | OUTPATIENT
Start: 2025-01-07 | End: 2025-01-07

## 2025-01-07 NOTE — ED PROVIDER NOTES
Patient Seen in: [facility] In [place]      History     Chief Complaint   Patient presents with    Abdomen/Flank Pain    Bleeding     Stated Complaint: Rectal bleeding since yesterday morning. Abdominal pain and chest pain. Patient*    Subjective:   Patient is a 44-year-old male well-known to the emergency room who was here earlier today for same complaints.  Patient reports rectal bleeding.  He also reports denies abdominal pain.  Had been diagnosed with a colitis nonspecific.  Patient has had extensive GI workup including recent admissions in December for GI bleeding they did not find any source of the bleeding I suspect there was a diverticular bleed.  Patient reports he had increased bleeding tonight.  Patient's vital signs are stable.  His hemoglobin level will be checked here to make sure there is not any significant drop that requires transfusion.  Patient is to follow-up with GI.  He also was noncompliant with going to his dialysis today patient is advised that he needs to contact his dialysis center and get dialysis treatment today.    The history is provided by the patient.             Objective:     Past Medical History:    Anxiety state    Arrhythmia    Asthma ([facility])    Attention deficit hyperactivity disorder (ADHD)    Back problem    Bipolar 1 disorder (HCC)    CKD (chronic kidney disease) stage 3, GFR 30-59 ml/min ([facility])    Dr Meeks    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HCC)    Congestive heart disease (HCC)    COPD (chronic obstructive pulmonary disease) (HCC)    Coronary atherosclerosis    Deep vein thrombosis ([facility])    at age 17 R/T cast    Depression    Diabetes (HCC)    Dialysis patient (HCC)    Diverticulosis of large intestine    Essential hypertension    3/21 echo: severe concentric LVH with normal EF and no MR or pHTN    Extrinsic asthma, unspecified    Heart attack (HCC)    2016- angiogram- no intervention    Heart valve disease    mitral valve repair in 1994/    High blood  pressure    High cholesterol    History of blood transfusion    History of mitral valve repair    Hyperlipidemia    Low back pain    tight and stiff after sweeping and mopping    LVH (left ventricular hypertrophy)    Migraines    Mixed hyperlipidemia     HDL 38 LDL 97 VLDL 57     Monoclonal gammopathy    IgG kappa     Muscle weakness    MVP (mitral valve prolapse)    Repair 1994 at [place]; echoes as recently as 3/21 show mild or trivial MR and no stenosis    Neuropathy    Osteoarthritis    hip ,knees    Pneumonia due to organism    Pulmonary embolism (HCC)    Renal disorder    Stroke (HCC)    TIA (transient ischemic attack)    Initial history of left-sided weakness and slurred speech. (+) cocaine. MRI of the brain, CT angiogram of the head and neck, and 2D echo are all unremarkable.     TMJ (dislocation of temporomandibular joint)    Troponin level elevated    Trop 60 60 47 with TIA and no CP: Lexiscan negative with EF 51    Visual impairment              Past Surgical History:   Procedure Laterality Date    Av fistula revision, open Left     Cabg      Colonoscopy N/A 03/26/2023    Procedure: COLONOSCOPY;  Surgeon: Heath Vu MD;  Location:  ENDOSCOPY    Colonoscopy N/A 12/30/2023    Procedure: COLONOSCOPY with cold snare polypectomy and forcep polypectomy;  Surgeon: Ousmane Suarez MD;  Location:  ENDOSCOPY    Colonoscopy & polypectomy  2019    Egd  2019    Duodenitis. Biopsied. EUS for weight loss was negative    Heart surgery      Hernia surgery  08/17/2022    Dr Barnes    Laminectomy,>2 sgmt,lumbar  09/06/2018    L4-L5 Decomp Discectomy RO[facility] L4-L5    Mitralplasty w cp bypass  1994    [place]: Repair    Repair rotator cuff,chronic Left     torn and had a ruptured bicep    Sinus surgery        Spine surgery procedure unlisted      Valve repair  1994    mitral valve                No pertinent social history.                Physical Exam     ED Triage Vitals [01/07/25 0234]   BP (!) 189/132    Pulse 95   Resp 24   Temp 98 °F (36.7 °C)   Temp src Temporal   SpO2 97 %   O2 Device None (Room air)       Current Vitals:   Vital Signs  BP: (!) 174/104  Pulse: 86  Resp: 19  Temp: 98 °F (36.7 °C)  Temp src: Temporal    Oxygen Therapy  SpO2: 96 %  O2 Device: None (Room air)        Physical Exam  Vitals and nursing note reviewed.   Constitutional:       General: He is not in acute distress.     Appearance: He is well-developed. He is not ill-appearing or toxic-appearing.   HENT:      Head: Normocephalic and atraumatic.   Eyes:      Extraocular Movements: Extraocular movements intact.      Pupils: Pupils are equal, round, and reactive to light.   Cardiovascular:      Rate and Rhythm: Normal rate and regular rhythm.      Heart sounds: Normal heart sounds.   Pulmonary:      Effort: Pulmonary effort is normal.      Breath sounds: Normal breath sounds.   Abdominal:      General: Bowel sounds are normal.      Palpations: Abdomen is soft.      Tenderness: There is no abdominal tenderness. There is no guarding or rebound.   Skin:     General: Skin is warm.      Capillary Refill: Capillary refill takes less than 2 seconds.      Coloration: Skin is not pale.   Neurological:      General: No focal deficit present.      Mental Status: He is alert and oriented to person, place, and time.   Psychiatric:         Mood and Affect: Mood normal.         Behavior: Behavior normal.             ED Course     Labs Reviewed   CBC WITH DIFFERENTIAL WITH PLATELET - Abnormal; Notable for the following components:       Result Value    RBC 3.41 (*)     HGB 10.8 (*)     HCT 31.3 (*)     Lymphocyte Absolute 0.79 (*)     All other components within normal limits   BASIC METABOLIC PANEL (8) - Abnormal; Notable for the following components:    Glucose 189 (*)     CO2 19.0 (*)      (*)     Creatinine 14.04 (*)     Calculated Osmolality 325 (*)     eGFR-Cr 4 (*)     All other components within normal limits        EKG shows ventricular rate  of 90 no ST elevation OR interval of 204 QRS of 94 QTc of 501 with axes of 41/12/90 unchanged from previous most recent from January 6, 2025      Chest x-ray shows no substantial change from prior similar right pleural effusion and opacity.  Stable position tunneled right IJ central venous catheter.  Angioplasty.  Stable cardiac size and mediastinal contours no pneumothorax no new consolidation upper abdomen unremarkable no acute bony findings.     MDM      Social -negative tobacco, negative etoh, negative drugs  Family History-noncontributory  Past Medical History-bipolar disorder, chronic kidney disease, migraine, heart valve disease, diabetes, CVA, congestive heart disease, dialysis patient, TIA, COPD, hyperlipidemia, DVT, pulmonary embolism, depression    Differential diagnosis before testing included colitis, chronic rectal bleeding, anemia, anxiety    Co-morbidities that add to the complexity of management include: Patient is noncompliant with getting his dialysis today patient advised that he needs to contact his dialysis center and to be seen and treated today    Testing ordered during this visit included CBC CMP chest x-ray and EKG    Radiographic images  I personally reviewed the radiographs and my individual interpretation shows right-sided pleural effusion unchanged from previous from December 12  I also reviewed the official reports that showed Chest x-ray shows no substantial change from prior similar right pleural effusion and opacity.  Stable position tunneled right IJ central venous catheter.  Angioplasty.  Stable cardiac size and mediastinal contours no pneumothorax no new consolidation upper abdomen unremarkable no acute bony findings.    External chart review showed review of Care Everywhere in [facility] system shows patient was seen today less than 24 hours ago diagnosed with colitis at that time.  Patient's hemoglobin stable his vital signs are normal here.  He was advised to use Tylenol for pain  control and go to his dialysis    History obtained by an independent source included from patient    Discussion of management with patient    Social determinants of health that affect care include patient noncompliant with going to dialysis      Medications Provided: Tylenol    Course of Events during Emergency Room Visit include 44-year-old male well-known to the emergency room presents for complaint of rectal bleeding.  Will check his hemoglobin level to make sure there is not a significant drop from earlier today.  Patient will have his BMP checked show evaluate for his potassium.  Patient offered EKG and chest x-ray as well as.  Patient advised that he needs to contact his dialysis center and get in for treatment today.  Patient explained the importance of being compliant with his dialysis.  Patient is to follow-up with GI as outpatient.      Patient reported he had chest pain as well today.  His EKG is unremarkable and unchanged from previous.  He had similar complaint earlier today was given Tylenol and Toradol.  Patient has alert in his chart that he cannot receive narcotic medications.    CBC shows no significant drop in hemoglobin level.  Awaiting results of BMP.  If patient having persistent chest pain will recommend Lidoderm patch for home.  Patient's BMP shows no elevation in his potassium level his kidney function is elevated which was to be expected as patient skipped dialysis today is advised that he needs to contact his dialysis center and get dialysis today.  Patient is also informed that I will be giving a prescription for Lidoderm patch for pain control that he may take in addition to the Tylenol he was recommended to take.    Disposition:          Discharge  I have discussed with the patient the results of test, differential diagnosis, treatment plan, warning signs and symptoms which should prompt immediate return.  They expressed understanding of these instructions and agrees to the following  plan provided.  They were given written discharge instructions and agrees to return for any concerns and voiced understanding and all questions were answered.           Medical Decision Making      Disposition and Plan     Clinical Impression:  1. Rectal bleeding    2. ESRD on hemodialysis (HCC)    3. Personal history of noncompliance with medical treatment         Disposition:  Discharge  1/7/2025  4:08 am    Follow-up:  Adrian Arce MD  [address] [facility] [id number] [phone]    Schedule an appointment as soon as possible for a visit      Jagdeep Joseph,   [address] [address] [phone]    Schedule an appointment as soon as possible for a visit            Medications Prescribed:  Current Discharge Medication List        START taking these medications    Details   lidocaine 5 % External Patch Place 1 patch onto the skin daily.  Qty: 30 patch, Refills: 0                 Supplementary Documentation:

## 2025-01-07 NOTE — DISCHARGE INSTRUCTIONS
You may take Tylenol as needed for pain control    You need to contact your dialysis center and schedule dialysis today

## 2025-01-09 LAB
ATRIAL RATE: 90 BPM
P AXIS: 44 DEGREES
P-R INTERVAL: 204 MS
Q-T INTERVAL: 410 MS
QRS DURATION: 94 MS
QTC CALCULATION (BEZET): 501 MS
R AXIS: 12 DEGREES
T AXIS: 90 DEGREES
VENTRICULAR RATE: 90 BPM

## 2025-01-18 ENCOUNTER — HOSPITAL ENCOUNTER (INPATIENT)
Facility: HOSPITAL | Age: 45
LOS: 4 days | Discharge: HOME | End: 2025-01-22
Attending: EMERGENCY MEDICINE | Admitting: HOSPITALIST
Payer: MEDICARE

## 2025-01-18 ENCOUNTER — APPOINTMENT (OUTPATIENT)
Dept: GENERAL RADIOLOGY | Facility: HOSPITAL | Age: 45
End: 2025-01-18
Payer: MEDICARE

## 2025-01-18 ENCOUNTER — APPOINTMENT (OUTPATIENT)
Dept: CV DIAGNOSTICS | Facility: HOSPITAL | Age: 45
End: 2025-01-18
Attending: EMERGENCY MEDICINE
Payer: MEDICARE

## 2025-01-18 ENCOUNTER — HOSPITAL ENCOUNTER (INPATIENT)
Facility: HOSPITAL | Age: 45
LOS: 4 days | Discharge: HOME HEALTH | End: 2025-01-22
Attending: EMERGENCY MEDICINE | Admitting: HOSPITALIST
Payer: MEDICARE

## 2025-01-18 ENCOUNTER — OFFICE VISIT (OUTPATIENT)
Dept: FAMILY MEDICINE CLINIC | Facility: CLINIC | Age: 45
End: 2025-01-18
Payer: COMMERCIAL

## 2025-01-18 VITALS
SYSTOLIC BLOOD PRESSURE: 191 MMHG | TEMPERATURE: 98 F | DIASTOLIC BLOOD PRESSURE: 121 MMHG | OXYGEN SATURATION: 88 % | RESPIRATION RATE: 26 BRPM | HEART RATE: 116 BPM

## 2025-01-18 DIAGNOSIS — Z91.158 DIALYSIS PATIENT, NONCOMPLIANT: ICD-10-CM

## 2025-01-18 DIAGNOSIS — Z91.199 MEDICALLY NONCOMPLIANT: ICD-10-CM

## 2025-01-18 DIAGNOSIS — D64.9 ANEMIA, UNSPECIFIED TYPE: ICD-10-CM

## 2025-01-18 DIAGNOSIS — R07.9 CHEST PAIN OF UNCERTAIN ETIOLOGY: Primary | ICD-10-CM

## 2025-01-18 DIAGNOSIS — R06.02 SHORTNESS OF BREATH: Primary | ICD-10-CM

## 2025-01-18 DIAGNOSIS — Z87.19 HISTORY OF LOWER GI BLEEDING: ICD-10-CM

## 2025-01-18 LAB
ADENOVIRUS PCR:: NOT DETECTED
ALBUMIN SERPL-MCNC: 4.2 G/DL (ref 3.2–4.8)
ALBUMIN/GLOB SERPL: 1.6 {RATIO} (ref 1–2)
ALP LIVER SERPL-CCNC: 76 U/L
ALT SERPL-CCNC: 30 U/L
ANION GAP SERPL CALC-SCNC: 14 MMOL/L (ref 0–18)
ANTIBODY SCREEN: NEGATIVE
AST SERPL-CCNC: 37 U/L (ref ?–34)
B PARAPERT DNA SPEC QL NAA+PROBE: NOT DETECTED
B PERT DNA SPEC QL NAA+PROBE: NOT DETECTED
BASOPHILS # BLD AUTO: 0.08 X10(3) UL (ref 0–0.2)
BASOPHILS NFR BLD AUTO: 0.8 %
BILIRUB SERPL-MCNC: 0.4 MG/DL (ref 0.3–1.2)
BUN BLD-MCNC: 78 MG/DL (ref 9–23)
C PNEUM DNA SPEC QL NAA+PROBE: NOT DETECTED
CALCIUM BLD-MCNC: 8.3 MG/DL (ref 8.7–10.6)
CHLORIDE SERPL-SCNC: 106 MMOL/L (ref 98–112)
CHOLEST SERPL-MCNC: 162 MG/DL (ref ?–200)
CO2 SERPL-SCNC: 21 MMOL/L (ref 21–32)
CORONAVIRUS 229E PCR:: NOT DETECTED
CORONAVIRUS HKU1 PCR:: NOT DETECTED
CORONAVIRUS NL63 PCR:: NOT DETECTED
CORONAVIRUS OC43 PCR:: DETECTED
CREAT BLD-MCNC: 11.17 MG/DL
EGFRCR SERPLBLD CKD-EPI 2021: 5 ML/MIN/1.73M2 (ref 60–?)
EOSINOPHIL # BLD AUTO: 0.44 X10(3) UL (ref 0–0.7)
EOSINOPHIL NFR BLD AUTO: 4.4 %
ERYTHROCYTE [DISTWIDTH] IN BLOOD BY AUTOMATED COUNT: 15.4 %
FLUAV + FLUBV RNA SPEC NAA+PROBE: NEGATIVE
FLUAV + FLUBV RNA SPEC NAA+PROBE: NEGATIVE
FLUAV RNA SPEC QL NAA+PROBE: NOT DETECTED
FLUBV RNA SPEC QL NAA+PROBE: NOT DETECTED
GLOBULIN PLAS-MCNC: 2.7 G/DL (ref 2–3.5)
GLUCOSE BLD-MCNC: 111 MG/DL (ref 70–99)
HCT VFR BLD AUTO: 25.3 %
HDLC SERPL-MCNC: 45 MG/DL (ref 40–59)
HGB BLD-MCNC: 8 G/DL
HGB BLD-MCNC: 8.5 G/DL
IMM GRANULOCYTES # BLD AUTO: 0.04 X10(3) UL (ref 0–1)
IMM GRANULOCYTES NFR BLD: 0.4 %
IRON SATN MFR SERPL: 11 %
IRON SERPL-MCNC: 22 UG/DL
LDLC SERPL CALC-MCNC: 94 MG/DL (ref ?–100)
LYMPHOCYTES # BLD AUTO: 1.12 X10(3) UL (ref 1–4)
LYMPHOCYTES NFR BLD AUTO: 11.1 %
MCH RBC QN AUTO: 32.4 PG (ref 26–34)
MCHC RBC AUTO-ENTMCNC: 33.6 G/DL (ref 31–37)
MCV RBC AUTO: 96.6 FL
METAPNEUMOVIRUS PCR:: NOT DETECTED
MONOCYTES # BLD AUTO: 1.06 X10(3) UL (ref 0.1–1)
MONOCYTES NFR BLD AUTO: 10.5 %
MYCOPLASMA PNEUMONIA PCR:: NOT DETECTED
NEUTROPHILS # BLD AUTO: 7.32 X10 (3) UL (ref 1.5–7.7)
NEUTROPHILS # BLD AUTO: 7.32 X10(3) UL (ref 1.5–7.7)
NEUTROPHILS NFR BLD AUTO: 72.8 %
NONHDLC SERPL-MCNC: 117 MG/DL (ref ?–130)
NT-PROBNP SERPL-MCNC: ABNORMAL PG/ML (ref ?–125)
OSMOLALITY SERPL CALC.SUM OF ELEC: 316 MOSM/KG (ref 275–295)
PARAINFLUENZA 1 PCR:: NOT DETECTED
PARAINFLUENZA 2 PCR:: NOT DETECTED
PARAINFLUENZA 3 PCR:: NOT DETECTED
PARAINFLUENZA 4 PCR:: NOT DETECTED
PLATELET # BLD AUTO: 213 10(3)UL (ref 150–450)
POTASSIUM SERPL-SCNC: 3.9 MMOL/L (ref 3.5–5.1)
PROT SERPL-MCNC: 6.9 G/DL (ref 5.7–8.2)
RBC # BLD AUTO: 2.62 X10(6)UL
RH BLOOD TYPE: POSITIVE
RHINOVIRUS/ENTERO PCR:: NOT DETECTED
RSV RNA SPEC NAA+PROBE: NEGATIVE
RSV RNA SPEC QL NAA+PROBE: NOT DETECTED
SARS-COV-2 RNA NPH QL NAA+NON-PROBE: NOT DETECTED
SARS-COV-2 RNA RESP QL NAA+PROBE: NOT DETECTED
SODIUM SERPL-SCNC: 141 MMOL/L (ref 136–145)
TOTAL IRON BINDING CAPACITY: 198 UG/DL (ref 250–425)
TRANSFERRIN SERPL-MCNC: 148 MG/DL (ref 215–365)
TRIGL SERPL-MCNC: 129 MG/DL (ref 30–149)
TROPONIN I SERPL HS-MCNC: 493 NG/L
TROPONIN I SERPL HS-MCNC: 516 NG/L
VLDLC SERPL CALC-MCNC: 21 MG/DL (ref 0–30)
WBC # BLD AUTO: 10.1 X10(3) UL (ref 4–11)

## 2025-01-18 PROCEDURE — 80053 COMPREHEN METABOLIC PANEL: CPT

## 2025-01-18 PROCEDURE — 5A1D70Z PERFORMANCE OF URINARY FILTRATION, INTERMITTENT, LESS THAN 6 HOURS PER DAY: ICD-10-PCS | Performed by: INTERNAL MEDICINE

## 2025-01-18 PROCEDURE — 86850 RBC ANTIBODY SCREEN: CPT | Performed by: EMERGENCY MEDICINE

## 2025-01-18 PROCEDURE — 30233N1 TRANSFUSION OF NONAUTOLOGOUS RED BLOOD CELLS INTO PERIPHERAL VEIN, PERCUTANEOUS APPROACH: ICD-10-PCS | Performed by: HOSPITALIST

## 2025-01-18 PROCEDURE — 93010 ELECTROCARDIOGRAM REPORT: CPT

## 2025-01-18 PROCEDURE — 93321 DOPPLER ECHO F-UP/LMTD STD: CPT | Performed by: EMERGENCY MEDICINE

## 2025-01-18 PROCEDURE — 96374 THER/PROPH/DIAG INJ IV PUSH: CPT

## 2025-01-18 PROCEDURE — 84484 ASSAY OF TROPONIN QUANT: CPT

## 2025-01-18 PROCEDURE — 90935 HEMODIALYSIS ONE EVALUATION: CPT | Performed by: STUDENT IN AN ORGANIZED HEALTH CARE EDUCATION/TRAINING PROGRAM

## 2025-01-18 PROCEDURE — 71045 X-RAY EXAM CHEST 1 VIEW: CPT | Performed by: EMERGENCY MEDICINE

## 2025-01-18 PROCEDURE — 87150 DNA/RNA AMPLIFIED PROBE: CPT | Performed by: HOSPITALIST

## 2025-01-18 PROCEDURE — 80061 LIPID PANEL: CPT

## 2025-01-18 PROCEDURE — 87077 CULTURE AEROBIC IDENTIFY: CPT | Performed by: HOSPITALIST

## 2025-01-18 PROCEDURE — 85018 HEMOGLOBIN: CPT | Performed by: HOSPITALIST

## 2025-01-18 PROCEDURE — 85025 COMPLETE CBC W/AUTO DIFF WBC: CPT | Performed by: EMERGENCY MEDICINE

## 2025-01-18 PROCEDURE — 80061 LIPID PANEL: CPT | Performed by: EMERGENCY MEDICINE

## 2025-01-18 PROCEDURE — 93308 TTE F-UP OR LMTD: CPT | Performed by: EMERGENCY MEDICINE

## 2025-01-18 PROCEDURE — 99285 EMERGENCY DEPT VISIT HI MDM: CPT

## 2025-01-18 PROCEDURE — 36430 TRANSFUSION BLD/BLD COMPNT: CPT

## 2025-01-18 PROCEDURE — 0241U SARS-COV-2/FLU A AND B/RSV BY PCR (GENEXPERT): CPT

## 2025-01-18 PROCEDURE — 99291 CRITICAL CARE FIRST HOUR: CPT

## 2025-01-18 PROCEDURE — 83880 ASSAY OF NATRIURETIC PEPTIDE: CPT | Performed by: EMERGENCY MEDICINE

## 2025-01-18 PROCEDURE — 86901 BLOOD TYPING SEROLOGIC RH(D): CPT | Performed by: EMERGENCY MEDICINE

## 2025-01-18 PROCEDURE — 0241U SARS-COV-2/FLU A AND B/RSV BY PCR (GENEXPERT): CPT | Performed by: EMERGENCY MEDICINE

## 2025-01-18 PROCEDURE — 83550 IRON BINDING TEST: CPT | Performed by: HOSPITALIST

## 2025-01-18 PROCEDURE — 36415 COLL VENOUS BLD VENIPUNCTURE: CPT

## 2025-01-18 PROCEDURE — 93325 DOPPLER ECHO COLOR FLOW MAPG: CPT | Performed by: EMERGENCY MEDICINE

## 2025-01-18 PROCEDURE — 83540 ASSAY OF IRON: CPT | Performed by: HOSPITALIST

## 2025-01-18 PROCEDURE — 87205 SMEAR GRAM STAIN: CPT | Performed by: HOSPITALIST

## 2025-01-18 PROCEDURE — 0202U NFCT DS 22 TRGT SARS-COV-2: CPT | Performed by: HOSPITALIST

## 2025-01-18 PROCEDURE — 93005 ELECTROCARDIOGRAM TRACING: CPT

## 2025-01-18 PROCEDURE — 87040 BLOOD CULTURE FOR BACTERIA: CPT | Performed by: HOSPITALIST

## 2025-01-18 PROCEDURE — 84484 ASSAY OF TROPONIN QUANT: CPT | Performed by: HOSPITALIST

## 2025-01-18 PROCEDURE — 86900 BLOOD TYPING SEROLOGIC ABO: CPT | Performed by: EMERGENCY MEDICINE

## 2025-01-18 PROCEDURE — 80053 COMPREHEN METABOLIC PANEL: CPT | Performed by: EMERGENCY MEDICINE

## 2025-01-18 PROCEDURE — 86920 COMPATIBILITY TEST SPIN: CPT

## 2025-01-18 PROCEDURE — 85025 COMPLETE CBC W/AUTO DIFF WBC: CPT

## 2025-01-18 PROCEDURE — 84484 ASSAY OF TROPONIN QUANT: CPT | Performed by: EMERGENCY MEDICINE

## 2025-01-18 RX ORDER — GABAPENTIN 100 MG/1
200 CAPSULE ORAL 3 TIMES DAILY
Status: DISCONTINUED | OUTPATIENT
Start: 2025-01-18 | End: 2025-01-22

## 2025-01-18 RX ORDER — ACETAMINOPHEN 500 MG
500 TABLET ORAL EVERY 4 HOURS PRN
Status: DISCONTINUED | OUTPATIENT
Start: 2025-01-18 | End: 2025-01-22

## 2025-01-18 RX ORDER — ASPIRIN 81 MG/1
324 TABLET, CHEWABLE ORAL ONCE
Status: COMPLETED | OUTPATIENT
Start: 2025-01-18 | End: 2025-01-18

## 2025-01-18 RX ORDER — TAMSULOSIN HYDROCHLORIDE 0.4 MG/1
0.4 CAPSULE ORAL DAILY
Status: DISCONTINUED | OUTPATIENT
Start: 2025-01-19 | End: 2025-01-22

## 2025-01-18 RX ORDER — MEMANTINE HYDROCHLORIDE 5 MG/1
5 TABLET ORAL 2 TIMES DAILY
Status: DISCONTINUED | OUTPATIENT
Start: 2025-01-18 | End: 2025-01-22

## 2025-01-18 RX ORDER — CALCIUM ACETATE 667 MG/1
667 CAPSULE ORAL DAILY
Status: DISCONTINUED | OUTPATIENT
Start: 2025-01-19 | End: 2025-01-22

## 2025-01-18 RX ORDER — SODIUM CHLORIDE 9 MG/ML
INJECTION, SOLUTION INTRAVENOUS ONCE
Status: DISCONTINUED | OUTPATIENT
Start: 2025-01-18 | End: 2025-01-22

## 2025-01-18 RX ORDER — CLONIDINE HYDROCHLORIDE 0.1 MG/1
0.1 TABLET ORAL 2 TIMES DAILY
Status: DISCONTINUED | OUTPATIENT
Start: 2025-01-18 | End: 2025-01-22

## 2025-01-18 RX ORDER — MULTIVIT-MIN/IRON/FOLIC ACID/K 18-600-40
1 CAPSULE ORAL WEEKLY
Status: ON HOLD | COMMUNITY

## 2025-01-18 RX ORDER — LOSARTAN POTASSIUM 100 MG/1
100 TABLET ORAL DAILY
Status: DISCONTINUED | OUTPATIENT
Start: 2025-01-18 | End: 2025-01-19

## 2025-01-18 RX ORDER — SEVELAMER CARBONATE 800 MG/1
2400 TABLET, FILM COATED ORAL
Status: DISCONTINUED | OUTPATIENT
Start: 2025-01-18 | End: 2025-01-22

## 2025-01-18 RX ORDER — ACETAMINOPHEN 500 MG
1000 TABLET ORAL ONCE
Status: COMPLETED | OUTPATIENT
Start: 2025-01-18 | End: 2025-01-18

## 2025-01-18 RX ORDER — ACETAMINOPHEN 500 MG
500 TABLET ORAL EVERY 4 HOURS PRN
Status: DISCONTINUED | OUTPATIENT
Start: 2025-01-18 | End: 2025-01-18

## 2025-01-18 RX ORDER — HYDROCODONE BITARTRATE AND ACETAMINOPHEN 7.5; 325 MG/1; MG/1
1 TABLET ORAL EVERY 6 HOURS PRN
Status: ON HOLD | COMMUNITY

## 2025-01-18 RX ORDER — ALBUTEROL SULFATE 90 UG/1
2 INHALANT RESPIRATORY (INHALATION) EVERY 6 HOURS PRN
Status: DISCONTINUED | OUTPATIENT
Start: 2025-01-18 | End: 2025-01-22

## 2025-01-18 RX ORDER — LAMOTRIGINE 100 MG/1
100 TABLET ORAL DAILY
Status: DISCONTINUED | OUTPATIENT
Start: 2025-01-19 | End: 2025-01-22

## 2025-01-18 RX ORDER — BUPROPION HYDROCHLORIDE 150 MG/1
150 TABLET ORAL DAILY
Status: DISCONTINUED | OUTPATIENT
Start: 2025-01-19 | End: 2025-01-22

## 2025-01-18 RX ORDER — DOXYCYCLINE 100 MG/1
100 CAPSULE ORAL 2 TIMES DAILY
Status: DISCONTINUED | OUTPATIENT
Start: 2025-01-18 | End: 2025-01-22

## 2025-01-18 RX ORDER — CARVEDILOL 12.5 MG/1
25 TABLET ORAL 2 TIMES DAILY WITH MEALS
Status: DISCONTINUED | OUTPATIENT
Start: 2025-01-18 | End: 2025-01-18

## 2025-01-18 RX ORDER — HYDRALAZINE HYDROCHLORIDE 25 MG/1
25 TABLET, FILM COATED ORAL EVERY 8 HOURS SCHEDULED
Status: DISCONTINUED | OUTPATIENT
Start: 2025-01-18 | End: 2025-01-22

## 2025-01-18 RX ORDER — NITROGLYCERIN 0.4 MG/1
0.4 TABLET SUBLINGUAL EVERY 5 MIN PRN
Status: DISCONTINUED | OUTPATIENT
Start: 2025-01-18 | End: 2025-01-22

## 2025-01-18 RX ORDER — HYDROCODONE BITARTRATE AND ACETAMINOPHEN 5; 325 MG/1; MG/1
1 TABLET ORAL EVERY 6 HOURS PRN
Status: DISCONTINUED | OUTPATIENT
Start: 2025-01-18 | End: 2025-01-22

## 2025-01-18 RX ORDER — ONDANSETRON 2 MG/ML
4 INJECTION INTRAMUSCULAR; INTRAVENOUS ONCE
Status: COMPLETED | OUTPATIENT
Start: 2025-01-18 | End: 2025-01-18

## 2025-01-18 NOTE — ED QUICK NOTES
RN to bedside to administer aspirin. Pt noted to be rolling back & forth in stretcher is c/o generalized joint pain. Aspirin given, pt denies CP or LIZBETH at this time, states he is having a \"bad headache\" and diffuse body pain only.

## 2025-01-18 NOTE — PROGRESS NOTES
Pt. To [facility] c/o SOB.  Reports started last night, feels congested, coughing as well. Reports Hx of COPD.  Reports feels like last time he had Covid.  Pt. With multiple co-morbidities.  Pt. With labored breathing, unable to speak in complete sentences. Pt. In tripod position to help with breathing.   Upon evaluation of respiratory difficulty, EMS was called for transport as patient needs higher level of care.     Pt. Monitored while waiting for EMS, 02 saturations 88-91.  Lung sounds mildly coarse, but no wheezing noted. EMS arrived and care endorsed.    Reached out to pt about her message regarding an appointment, pt is Scheduled to be seen for left shoulder pain on 5/22/24    ----- Message from Letha Espinosa sent at 5/13/2024  2:57 PM CDT -----  Contact: self  [phone]  Patient called in this afternoon wanting to schedule an appointment concerning her left shoulder, but none populated. Can you get her scheduled. [facility]  [phone]. Thanks tpnicki

## 2025-01-18 NOTE — ED QUICK NOTES
Aspirin held at this time due to patient nausea. Zofran given. Patient also mentions headache s/p nitroglcerin.

## 2025-01-18 NOTE — ED PROVIDER NOTES
Patient Seen in: [facility] Emergency Department      History     Chief Complaint   Patient presents with   • Difficulty Breathing   • Body ache and/or chills     Stated Complaint:     Subjective:   HPI      Chest pain difficulty breathing, cough  Recent GI bleeding  Skipped Dialysis Friday- last Dialysis Wednesday  Off anticoagulants    Objective:     Past Medical History:   • Anxiety state   • Arrhythmia   • Asthma ([facility])   • Attention deficit hyperactivity disorder (ADHD)   • Back problem   • Bipolar 1 disorder ([facility])   • CKD (chronic kidney disease) stage 3, GFR 30-59 ml/min ([facility])    Dr Meeks   • 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[facility])   • Congestive heart disease ([facility])   • COPD (chronic obstructive pulmonary disease) ([facility])   • Coronary atherosclerosis   • Deep vein thrombosis ([facility])    at age 17 R/T cast   • Depression   • Diabetes ([facility])   • Dialysis patient ([facility])   • Diverticulosis of large intestine   • Essential hypertension    3/21 echo: severe concentric LVH with normal EF and no MR or pHTN   • Extrinsic asthma, unspecified   • Heart attack ([facility])    2016- angiogram- no intervention   • Heart valve disease    mitral valve repair in 1994/   • High blood pressure   • High cholesterol   • History of blood transfusion   • History of mitral valve repair   • Hyperlipidemia   • Low back pain    tight and stiff after sweeping and mopping   • LVH (left ventricular hypertrophy)   • Migraines   • Mixed hyperlipidemia     HDL 38 LDL 97 VLDL 57    • Monoclonal gammopathy    IgG kappa    • Muscle weakness   • MVP (mitral valve prolapse)    Repair 1994 at [place]; echoes as recently as 3/21 show mild or trivial MR and no stenosis   • Neuropathy   • Osteoarthritis    hip ,knees   • Pneumonia due to organism   • Pulmonary embolism ([facility])   • Renal disorder   • Stroke ([facility])   • TIA (transient ischemic attack)    Initial history of left-sided weakness and slurred speech. (+) cocaine. MRI of the brain, CT angiogram of  the head and neck, and 2D echo are all unremarkable.    • TMJ (dislocation of temporomandibular joint)   • Troponin level elevated    Trop 60 60 47 with TIA and no CP: Lexiscan negative with EF 51   • Visual impairment              Past Surgical History:   Procedure Laterality Date   • Av fistula revision, open Left    • Cabg     • Colonoscopy N/A 2023    Procedure: COLONOSCOPY;  Surgeon: Heath Vu MD;  Location:  ENDOSCOPY   • Colonoscopy N/A 2023    Procedure: COLONOSCOPY with cold snare polypectomy and forcep polypectomy;  Surgeon: Ousmane Suarez MD;  Location:  ENDOSCOPY   • Colonoscopy & polypectomy     • Egd  2019    Duodenitis. Biopsied. EUS for weight loss was negative   • Heart surgery     • Hernia surgery  2022    Dr Barnes   • Laminectomy,>2 sgmt,lumbar  2018    L4-L5 Decomp Discectomy ROEM L4-L5   • Mitralplasty w cp bypass      Christiano: Repair   • Repair rotator cuff,chronic Left     torn and had a ruptured bicep   • Sinus surgery       • Spine surgery procedure unlisted     • Valve repair      mitral valve                Social History     Socioeconomic History   • Marital status:    Tobacco Use   • Smoking status: Former     Current packs/day: 0.00     Average packs/day: 1 pack/day for 27.0 years (27.0 ttl pk-yrs)     Types: Cigarettes     Start date: 1995     Quit date: 2022     Years since quittin.8     Passive exposure: Never   • Smokeless tobacco: Never   Vaping Use   • Vaping status: Never Used   Substance and Sexual Activity   • Alcohol use: No   • Drug use: No     Social Drivers of Health     Financial Resource Strain: Medium Risk (2024)    Received from [facility]    Overall Financial Resource Strain (CARDIA)    • Difficulty of Paying Living Expenses: Somewhat hard   Food Insecurity: Low Risk  (2025)    Received from [facility] Food Security    • Within the past 12 months, the food you bought just  didn't last and you didn't have money to get more.: 3    • Within the past 12 months, you worried that your food would run out before you got money to buy more.: 3   Transportation Needs: Not At Risk (1/8/2025)    Received from [facility] Transportation Needs    • In the past 12 months, has lack of reliable transportation kept you from medical appointments, meetings, work or from getting things needed for daily living?: No   Physical Activity: Inactive (5/7/2024)    Received from [facility]    Exercise Vital Sign    • Days of Exercise per Week: 0 days    • Minutes of Exercise per Session: 0 min   Stress: Stress Concern Present (5/7/2024)    Received from [facility]    [employer] [place] [employer] - Occupational Stress Questionnaire    • Feeling of Stress : Rather much   Social Connections: Unknown (5/7/2024)    Received from [facility]    Social Connection and Isolation Panel [NHANES]    • Frequency of Communication with Friends and Family: More than three times a week    • Frequency of Social Gatherings with Friends and Family: More than three times a week    • Attends Evangelical Services: Never    • Active Member of Clubs or Organizations: No    • Attends Club or Organization Meetings: Never    • Marital Status: Patient unable to answer   Housing Stability: Not At Risk (1/8/2025)    Received from [facility] Housing    • What is your living situation today?: I have a steady place to live    • Think about the place you live. Do you have problems with any of the following?: None of the above                  Physical Exam     ED Triage Vitals [01/18/25 0950]   BP (!) 157/112   Pulse 109   Resp (!) 30   Temp 98.6 °F (37 °C)   Temp src Oral   SpO2 97 %   O2 Device None (Room air)       Current Vitals:   Vital Signs  BP: (!) 154/104  Pulse: 108  Resp: (!) 30  Temp: 98.6 °F (37 °C)  Temp src: Oral  MAP (mmHg): (!) 117    Oxygen Therapy  SpO2: 94 %  O2 Device:  None (Room air)        Physical Exam  ***      ED Course     Labs Reviewed   CBC WITH DIFFERENTIAL WITH PLATELET - Abnormal; Notable for the following components:       Result Value    RBC 2.62 (*)     HGB 8.5 (*)     HCT 25.3 (*)     Monocyte Absolute 1.06 (*)     All other components within normal limits   COMP METABOLIC PANEL (14)   PRO BETA NATRIURETIC PEPTIDE   TROPONIN I HIGH SENSITIVITY   TYPE AND SCREEN    Narrative:     The following orders were created for panel order Type and screen.  Procedure                               Abnormality         Status                     ---------                               -----------         ------                     ABORH (Blood Type)[[id number]]                               In process                 Antibody Screen[[id number]]                                  In process                   Please view results for these tests on the individual orders.   ABORH (BLOOD TYPE)   ANTIBODY SCREEN   RAINBOW DRAW LAVENDER   RAINBOW DRAW LIGHT GREEN   RAINBOW DRAW BLUE   SARS-COV-2/FLU A AND B/RSV BY PCR (GENEXPERT)     EKG    Rate, intervals and axes as noted on EKG Report.  Rate: ***  Rhythm: {ED ED RHYTHM:677915}  Reading: ***           ED Course as of 01/18/25 1113  ------------------------------------------------------------  Time: 01/18 1010  Value: Hemoglobin(!): 8.5  Comment: Recent multiple bouts of GI bleeding hemoglobin 10.5 [facility] on the ninth of this month so 9 days ago.  ------------------------------------------------------------  Time: 01/18 1049  Comment: Troponin 516- but always elevated- echocardiogram shows no active ischemia but moderate to severe mitral regurgitation    After nitroglycerin improvement in BP- will repeat EKG now  ------------------------------------------------------------  Time: 01/18 1112  Comment: Reviewed the chest x-ray and compared to previous, likely increased vascular congestion.  Without fevers or leukocytosis likely  not pneumonia but patient states he has beenFeeling generally sick and coughing things up so we can reassess this after dialysis but he certainly needs       ***       MDM      ***  Spoke with Dr Rubin -       MAURICIO    Disposition and Plan     Clinical Impression:  No diagnosis found.     Disposition:  There is no disposition on file for this visit.  There is no disposition time on file for this visit.    Follow-up:  No follow-up provider specified.        Medications Prescribed:  Current Discharge Medication List              Supplementary Documentation:                                                            (*)     Creatinine 8.71 (*)     Calculated Osmolality 303 (*)     eGFR-Cr 7 (*)     Phosphorus 7.5 (*)     All other components within normal limits   POCT GLUCOSE - Abnormal; Notable for the following components:    POC Glucose 124 (*)     All other components within normal limits   POCT GLUCOSE - Abnormal; Notable for the following components:    POC Glucose 133 (*)     All other components within normal limits   POCT GLUCOSE - Abnormal; Notable for the following components:    POC Glucose 126 (*)     All other components within normal limits   POCT GLUCOSE - Abnormal; Notable for the following components:    POC Glucose 110 (*)     All other components within normal limits   POCT GLUCOSE - Abnormal; Notable for the following components:    POC Glucose 140 (*)     All other components within normal limits   POCT GLUCOSE - Abnormal; Notable for the following components:    POC Glucose 103 (*)     All other components within normal limits   POCT GLUCOSE - Abnormal; Notable for the following components:    POC Glucose 148 (*)     All other components within normal limits   POCT GLUCOSE - Abnormal; Notable for the following components:    POC Glucose 121 (*)     All other components within normal limits   POCT GLUCOSE - Abnormal; Notable for the following components:    POC Glucose 100 (*)     All other components within normal limits   POCT GLUCOSE - Abnormal; Notable for the following components:    POC Glucose 151 (*)     All other components within normal limits   BLOOD CULTURE - Abnormal; Notable for the following components:    Blood Culture Result Gram positive deo (*)     Blood Smear Gram Positive Rods (*)     All other components within normal limits    Narrative:     Aerobic Bottle Volume - 8 mL  Anaerobic Bottle Volume - 8 mL  Aerobic Bottle Volume - 8 mL    Aerobic Bottle Time to Detection - 55 hr  42 min    BLOOD CULTURE - Abnormal; Notable for the following components:    Blood Culture Result  Staphylococcus epidermidis (*)     Blood Smear Positive Blood Culture (*)     Blood Smear Gram positive cocci in pairs and clusters (*)     All other components within normal limits    Narrative:     Anaerobic Bottle Volume - 11 mL  Aerobic Bottle Volume - 11 mL  Aerobic Bottle Volume - 11 mL    Aerobic Bottle Time to Detection - 20 hr    RESPIRATORY FLU EXPAND PANEL + COVID-19 - Abnormal; Notable for the following components:    Coronavirus Oc43 PCR: Detected (*)     All other components within normal limits    Narrative:     This test is intended for the simultaneous qualitative detection and differentiation of nucleic acids from multiple viral and bacterial respiratory organisms, including nucleic acid from Severe Acute Respiratory Syndrome Coronavirus 2 (SARS-CoV-2) in nasopharyngeal swab from individuals suspected of respiratory viral infection consistent with COVID-19 by their healthcare provider.    Test performed using the [employer] Respiratory Panel 2.1 (RP2.1) assay on the [employer] 2.0 System, [employer], [employer], [place] [address].    This test is being used under the Food and Drug Administration's Emergency Use Authorization.    The authorized Fact Sheet for Healthcare Providers for this assay is available upon request from the laboratory.    SARS and MERS coronaviruses are not tested on this assay.   BLD CULT ID BY PCR - Abnormal; Notable for the following components:    Staphylococcus epidermidis by PCR    Detected (*)     All other components within normal limits   LIPID PANEL - Normal   MAGNESIUM - Normal   MAGNESIUM - Normal   POCT GLUCOSE - Normal   POCT GLUCOSE - Normal   SARS-COV-2/FLU A AND B/RSV BY PCR (GENEXPERT) - Normal    Narrative:     This test is intended for the qualitative detection and differentiation of SARS-CoV-2, influenza A, influenza B, and respiratory syncytial virus (RSV) viral RNA in nasopharyngeal or nares swabs from individuals suspected of respiratory viral  infection consistent with COVID-19 by their healthcare provider. Signs and symptoms of respiratory viral infection due to SARS-CoV-2, influenza, and RSV can be similar.    Test performed using the Xpert Xpress SARS-CoV-2/FLU/RSV (real time RT-PCR)  assay on the [employer] instrument, [employer], [place] [address].   This test is being used under the Food and Drug Administration's Emergency Use Authorization.    The authorized Fact Sheet for Healthcare Providers for this assay is available upon request from the laboratory.   TYPE AND SCREEN    Narrative:     The following orders were created for panel order Type and screen.  Procedure                               Abnormality         Status                     ---------                               -----------         ------                     ABORH (Blood Type)[[id number]]                               Final result               Antibody Screen[[id number]]                                  Final result                 Please view results for these tests on the individual orders.   ABORH (BLOOD TYPE)   ANTIBODY SCREEN   PREPARE RBC   RAINBOW DRAW LAVENDER   RAINBOW DRAW LIGHT GREEN   RAINBOW DRAW BLUE   BLOOD CULTURE    Narrative:     Aerobic Bottle Volume - 9 mL   BLOOD CULTURE   BLD CULT ID BY PCR   SPUTUM CULTURE     EKG    Rate, intervals and axes as noted on EKG Report.  Rate: 108  Rhythm: Sinus Rhythm  Reading: Ann 8 bpm sinus rhythm with significant left ventricular hypertrophy as well as S waves across the septal leads this is an abnormal EKG there is concern for ST elevation in the anterior leads which could be rate related or could be from the patient significantly elevated blood pressure on arrival but in the setting of chest pain and discussed the case with [facility] cardiology.    EKG EKG done after nitroglycerin and decrease in the blood pressure    Rate, intervals and axes as noted on EKG Report.  Rate: 107  Rhythm: Sinus Rhythm  Reading: Rhythm ongoing  tachycardia sinus ST elevation only in V3 no significant reciprocal changes likely stable EKG cardiology continues to follow along           This the case with cardiac alert discussed with cardiology and cardiac alert was canceled after discussion.  This is a noncompliant diabetic patient with significant history of myocardial infarction pulmonary embolus but also noncompliant dialysis patient who does have chest pain but also left-sided abdominal pain and he does have an element of chronic chest pain.  He is EKG was grossly abnormal upon arrival so cardiac alert was called in and discussing the case with Dr. Karimi.  He and I both feel that this is likely not an acute presentation of a myocardial infarction and that an emergent trip to the Cath Lab right now is not the patient's most pressing need but rather the need for dialysis, the general feeling of illness, patient will need admission to the hospital.  He does seem to have some increased vascular congestion but the cardiac alert was canceled at this time.       ED Course as of 02/02/25 0903  ------------------------------------------------------------  Time: 01/18 1010  Value: Hemoglobin(!): 8.5  Comment: Recent multiple bouts of GI bleeding hemoglobin 10.5 [facility] on the ninth of this month so 9 days ago.  ------------------------------------------------------------  Time: 01/18 1049  Comment: Troponin 516- but always elevated- echocardiogram shows no active ischemia but moderate to severe mitral regurgitation    After nitroglycerin improvement in BP- will repeat EKG now  ------------------------------------------------------------  Time: 01/18 1112  Comment: Reviewed the chest x-ray and compared to previous, likely increased vascular congestion.  Without fevers or leukocytosis likely not pneumonia but patient states he has beenFeeling generally sick and coughing things up so we can reassess this after dialysis but he certainly needs       Chest  x-ray is independently interpreted by myself does show vascular congestion and a tunneled Shiley catheter with significant cardiomegaly the cardiomegaly is chronic vascular congestion would be concerning with all of the missed dialysis       MDM      44-year-old patient with an impressive list of past medical problems including but not limited to dialysis dependence, uncontrolled essential hypertension, monoclonal colopathy, coronary artery disease with both a history of myocardial infarction stroke but in addition to that pulmonary emboli as well.  He has had multiple multiple hospitalizations over the past few months some of which are for chest pain many of which are for rectal bleeding.  On review of those medical charts I do not have a clear etiology for the patient's rectal bleeding at 1 point it was thought to be possibly gastritis at another part hemorrhoids he has undergone blood transfusions for this.  In addition, he does have significant volume overload at times from dialysis and he does skip dialysis when he is not feeling well which is what happened this past week.  In addition to that he thinks he still having some rectal bleeding now his hemoglobin is 8.5 which is somewhat improved from previous but he has been recently hospitalized with blood transfusions for that.  Given his presentation with chest pain and difficulty breathing and an abnormal EKG which is always abnormal but perhaps more so right now with an accelerated rhythm and significant hypertension as it is unclear and we have had multiple answers depending on who asked the patient if he took his medicines today or last night or what he took.  Spoke with Dr Rubin -   Admission disposition: 1/18/2025 11:38 AM       This point we are going to treat the blood pressure start diuresing the patient arrange for dialysis but neither he nor I think that the chest pain is necessarily the most pressing issue at this moment.  The patient also also  stating that he has had a cough and congestion and coughing things up and felt generally ill so we clearly need to get viral testing and evaluate this patient who is at high risk for underlying infections with multiple hospitalizations indwelling lines dialysis for significant infection.    Unfortunately in addition to all of the other issues patient does have significant chronic pain and history of bipolar disorder and history of significant abuse of narcotic medications and many hospital situations this is documented well throughout multiple hospitalizations at both this hospital and most of the [facility] hospitals.  While I am absolutely happy to treat this patient's medical pain because he clearly has it, I do hesitate to continue giving him significant high-dose narcotic pain medications to mask symptoms.  Started the patient on nitroglycerin, aspirin, Zofran given for nausea, Tylenol given for chills tactile fever patient restarted on his home blood pressure medications given broad-spectrum antibiotics in the form of ceftriaxone because I really cannot rule out underlying pneumonia with his chest x-ray because by my personal read it looks like vascular congestion but certainly could be hiding pneumonia in the pleural effusion.    Cardiology saw the patient here in the emergency department hospitalist came and saw the patient as well respiratory viral panel ordered hemoglobin of 8.5 is improved from his hemoglobin of 6 at [facility] but certainly is down from his baseline.  This could be multifactorial but we have to be concerned for possible ongoing blood loss from a GI bleed of course he could just have anemia of chronic disease from his significant renal failure.  His BNP is significantly elevated over 40,000 he needs dialysis as I discussed with the nephrologist who also know the patient very well.  He will get dialysis today.    Patient absolutely requires admission to the hospital and ongoing care.  While he  has been persistently hypertensive with a sinus tachycardia in the room oxygen saturations have been adequate and he requires emergent dialysis to fix a good many of these concerns.    Critical care time exclusive of procedure time on this patient with multiple reevaluations critical decision making coordination of care review of the chart which was extensive and life-saving medical decision making was 65 minutes.    Medical Decision Making      Disposition and Plan     Clinical Impression:  1. Chest pain of uncertain etiology    2. Medically noncompliant    3. Dialysis patient, noncompliant    4. Anemia, unspecified type    5. History of lower GI bleeding         Disposition:  Admit  1/18/2025 11:38 am    Follow-up:  Adrian Arce MD  [address] [facility] [address]  [phone]    Follow up on 1/23/2025  at 1pm          Medications Prescribed:  Discharge Medication List as of 1/22/2025  3:38 PM        START taking these medications    Details   Vancomycin HCl in NaCl 1-0.9 GM/250ML-% Intravenous Solution Inject 250 mL (1 g total) into the vein 3 (three) times a week for 11 days. To be given with HD, through 2/1/2025. Weekly CBC with diff, CMP while on therapy, Check vanco levels as per HD protocol., Print Script, Disp-5000 mL, R-0                 Supplementary Documentation:         Hospital Problems       Present on Admission  Date Reviewed: 1/18/2025            ICD-10-CM Noted POA    * (Principal) Chest pain of uncertain etiology R07.9 1/18/2025 Unknown    Anemia, unspecified type D64.9 9/15/2024 Unknown    Dialysis patient, noncompliant Z91.158 1/18/2025 Unknown    History of lower GI bleeding Z87.19 1/18/2025 Unknown    Medically noncompliant Z91.199 1/18/2025 Unknown

## 2025-01-18 NOTE — ED INITIAL ASSESSMENT (HPI)
Patient to ED via EMS from a walk in clinic for LIZBETH. Patient presented to walk in clinic for chills, body aches, cough since last night. Patient is on hemodialysis and last went on 01/15/25.

## 2025-01-18 NOTE — CONSULTS
[facility] - Nephrology  Inpatient Consultation    Godwin Fonseca Patient Status:  Emergency    1980 MRN [id number]   [facility] EMERGENCY DEPARTMENT Attending Deanna Cervantes MD   Hosp Day # 0 PCP Adrian Small MD     Reason for consult: ESRD on HD  Date of consultation: 2025     HISTORY OF PRESENT ILLNESS:     Godwin Fonseca is a 44 year old male with a medical history notable for ESRD on HD MWF who presents with pain throughout body. Missed HD yesterday due to the pain. Multiple recent hospitalizations for GI bleeding concerns. Nephrology consulted for HD management.     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[facility])    Congestive heart disease (HCC)    COPD (chronic obstructive pulmonary disease) ([facility])    Coronary atherosclerosis    Deep vein thrombosis ([facility])    at age 17 R/T cast    Depression    Diabetes ([facility])    Dialysis patient ([facility])    Diverticulosis of large intestine    Essential hypertension    3/21 echo: severe concentric LVH with normal EF and no MR or pHTN    Extrinsic asthma, unspecified    Heart attack (HCC)    - angiogram- no intervention    Heart valve disease    mitral valve repair in /    High blood pressure    High cholesterol    History of blood transfusion    History of mitral valve repair    Hyperlipidemia    Low back pain    tight and stiff after sweeping and mopping    LVH (left ventricular hypertrophy)    Migraines    Mixed hyperlipidemia     HDL 38 LDL 97 VLDL 57     Monoclonal gammopathy    IgG kappa     Muscle weakness    MVP (mitral valve prolapse)    Repair  at [place]; echoes as recently as 3/21 show mild or trivial MR and no  stenosis    Neuropathy    Osteoarthritis    hip ,knees    Pneumonia due to organism    Pulmonary embolism (HCC)    Renal disorder    Stroke (HCC)    TIA (transient ischemic attack)    Initial history of left-sided weakness and slurred speech. (+) cocaine. MRI of the brain, CT angiogram of the head and neck, and 2D echo are all unremarkable.     TMJ (dislocation of temporomandibular joint)    Troponin level elevated    Trop 60 60 47 with TIA and no CP: Lexiscan negative with EF 51    Visual impairment     Past Surgical History:   Procedure Laterality Date    Av fistula revision, open Left     Cabg      Colonoscopy N/A 03/26/2023    Procedure: COLONOSCOPY;  Surgeon: Heath Vu MD;  Location:  ENDOSCOPY    Colonoscopy N/A 12/30/2023    Procedure: COLONOSCOPY with cold snare polypectomy and forcep polypectomy;  Surgeon: Ousmane Suarez MD;  Location:  ENDOSCOPY    Colonoscopy & polypectomy  2019    Egd  2019    Duodenitis. Biopsied. EUS for weight loss was negative    Heart surgery      Hernia surgery  08/17/2022    Dr Barnes    Laminectomy,>2 sgmt,lumbar  09/06/2018    L4-L5 Decomp Discectomy ROEM L4-L5    Mitralplasty w cp bypass  1994    [place]: Repair    Repair rotator cuff,chronic Left     torn and had a ruptured bicep    Sinus surgery        Spine surgery procedure unlisted      Valve repair  1994    mitral valve     Family History   Problem Relation Age of Onset    Hypertension Father     Alcohol and Other Disorders Associated Father     Substance Abuse Father         cocaine    Dementia Father     Cancer Father         lung    Diabetes Mother     Cancer Mother         multiple myeloma    Hypertension Mother     Anxiety Maternal Aunt     Depression Maternal Aunt     Anxiety Maternal Aunt     Depression Maternal Aunt     Bipolar Disorder Maternal Aunt     Diabetes Maternal Grandmother     Hypertension Maternal Grandmother     Cancer Maternal Grandfather         stomach cancer    Diabetes Maternal  Grandfather     Hypertension Maternal Grandfather     Alcohol and Other Disorders Associated Maternal Grandfather     Hypertension Paternal Grandmother     Hypertension Paternal Grandfather     Cancer Sister         uterine and ovarian    Hypertension Sister     Cancer Maternal Uncle         lung    Cancer Paternal Aunt         throat      reports that he quit smoking about 2 years ago. His smoking use included cigarettes. He started smoking about 29 years ago. He has a 27 pack-year smoking history. He has never been exposed to tobacco smoke. He has never used smokeless tobacco. He reports that he does not drink alcohol and does not use drugs.  Allergies[1]    MEDICATIONS:       Current Facility-Administered Medications:     nitroglycerin (Nitrostat) SL tab 0.4 mg, 0.4 mg, Sublingual, Q5 Min PRN    Prescriptions Prior to Admission[2]     PHYSICAL EXAM:     Vital Signs: BP (!) 158/114   Pulse 113   Temp 98.6 °F (37 °C) (Oral)   Resp 23   Ht 6' 2\" (1.88 m)   Wt 250 lb (113.4 kg)   SpO2 94%   BMI 32.10 kg/m²   Temp (24hrs), Av.5 °F (36.9 °C), Min:98.4 °F (36.9 °C), Max:98.6 °F (37 °C)     No intake or output data in the 24 hours ending 25 1345  Wt Readings from Last 3 Encounters:   25 250 lb (113.4 kg)   25 244 lb 11.4 oz (111 kg)   25 245 lb (111.1 kg)       General: + distress  HEENT: normocephalic, atraumatic  CV: RRR  Respiratory: no respiratory distress  Extremities: no edema bilaterally  Skin: warm, dry  Neuro: awake, alert    LABORATORY DATA:     Lab Results   Component Value Date     (H) 2025    BUN 78 (H) 2025    BUNCREA 13.0 2022    CREATSERUM 11.17 (H) 2025    ANIONGAP 14 2025    GFR 59 (L) 2018    GFRNAA 30 (L) 2022    GFRAA 35 (L) 2022    CA 8.3 (L) 2025    OSMOCALC 316 (H) 2025    ALKPHO 76 2025    AST 37 (H) 2025    ALT 30 2025    BILT 0.4 2025    TP 6.9 2025    ALB 4.2  01/18/2025    GLOBULIN 2.7 01/18/2025     01/18/2025    K 3.9 01/18/2025     01/18/2025    CO2 21.0 01/18/2025     Lab Results   Component Value Date    WBC 10.1 01/18/2025    RBC 2.62 (L) 01/18/2025    HGB 8.5 (L) 01/18/2025    HCT 25.3 (L) 01/18/2025    .0 01/18/2025    MPV 11.5 12/14/2012    MCV 96.6 01/18/2025    MCH 32.4 01/18/2025    MCHC 33.6 01/18/2025    RDW 15.4 01/18/2025    NEPRELIM 7.32 01/18/2025    NEPERCENT 72.8 01/18/2025    LYPERCENT 11.1 01/18/2025    MOPERCENT 10.5 01/18/2025    EOPERCENT 4.4 01/18/2025    BAPERCENT 0.8 01/18/2025    NE 7.32 01/18/2025    LYMABS 1.12 01/18/2025    MOABSO 1.06 (H) 01/18/2025    EOABSO 0.44 01/18/2025    BAABSO 0.08 01/18/2025     Lab Results   Component Value Date    MALBP 167.00 05/24/2023    CREUR 142.00 05/24/2023    CREUR 142.00 05/24/2023     Lab Results   Component Value Date    COLORUR Light-Yellow 11/17/2024    CLARITY Clear 11/17/2024    SPECGRAVITY 1.013 11/17/2024    GLUUR Normal 11/17/2024    BILUR Negative 11/17/2024    KETUR Negative 11/17/2024    BLOODURINE Negative 11/17/2024    PHURINE 8.5 (H) 11/17/2024    PROUR 100 (A) 11/17/2024    UROBILINOGEN Normal 11/17/2024    NITRITE Negative 11/17/2024    LEUUR Negative 11/17/2024    WBCUR 1-5 11/17/2024    RBCUR 0-2 11/17/2024    EPIUR Few (A) 11/17/2024    BACUR Rare (A) 11/17/2024    CAOXUR Occasional (A) 04/20/2018    HYLUR Present (A) 05/14/2019       IMAGING:     Reviewed    ASSESSMENT:      # ESRD on HD  -MWF schedule outpatient  -[facility]  -L AVF, R TDC     # HTN/Volume Status  -Home medications: carvedilol, hydralazine, losartan, nifedipine      # Anemia     # Secondary Hyperparathyroidism     PLAN:      -HD today as patient missed session yesterday. Advised compliance with dialysis.   -Will resume MWF schedule moving forward  -UF with HD as tolerated  -Continue home BP medications   -Defer OWEN in setting of high BPs  -Anemia management per primary - hemoglobin  lower than baseline.  -Continue sevelamer 800 TID  -Avoid nephrotoxins and renally dose medications for creatinine clearance  -Monitor intake and output daily  -Daily weights        We will continue to follow.    Thank you for allowing us to participate in the care of this patient.         DO [facility] - Nephrology               [1]   Allergies  Allergen Reactions    Hydrochlorothiazide RASH and HIVES   [2] (Not in a hospital admission)

## 2025-01-18 NOTE — CONSULTS
[facility]  Report of Consultation    Godwin Fonseca Patient Status:  Observation    1980 MRN [id number]   [facility] 7NE-A Attending Yuriy Forrest MD   Hosp Day # 0 PCP Adrian Small MD         Assessment:    Diffuse pain throughout his body including head, ear, neck, shoulder, chest and abdomen.  EKG with mild ST elevation in V3 not meeting criteria for STEMI without reciprocal changes.  Chronic troponin elevation.  Currently 516.  As high as 2000 in the past  ESRD on dialysis.  Noncompliance  Hypertension: Noncompliance with antihypertensives  Bipolar  Nonobstructive minimal CAD on angiogram done in   Status post mitral valve repair in  with redo robotic assisted mitral valve repair in       Plan:  EKG not meeting strict criteria for STEMI and patient has history of ST changes due to LVH.  Troponin is more elevated than previous admission but he has missed dialysis.  It is lower than his peak of .  Repeat EKG once blood pressure came down shows normalization without significant ST changes from baseline.  Preliminary echo at bedside shows normal LV function without distinct wall motion abnormalities I.  Given normalization of EKG with blood pressure and normal LVEF we will hold off on cardiac catheterization for now  Trend troponin  Monitor anemia which could be exacerbating chest pain in the setting of hypertension  Continue p.o. hydralazine 25 3 times daily, nifedipine 30 mg daily, and carvedilol 25 mg twice daily.  Goal blood pressure less than 150/90 given chronically elevated numbers and historic noncompliance with medications  Will monitor closely.  If significant changes in EKG and or elevations in troponin with worsening chest pains we will consider angiogram  Will need to monitor anemia as this is likely contributing to his fatigue  Final echo pending   I again explained the importance of needing to commit to dialysis and not miss sessions.  He is a high  risk for readmission patient with multiple admissions due to noncompliance with therapy.    Reason for Consultation:   Chest pain     History of Present Illness:   Last admission: Godwin Fonseca is a(n) 44 year old male with a past history of HTN, DVT / PE 1/23, Bipolar disorder, and valvular heart disease. Pt underwent MVR in 1994 and redo with robotically assisted MVR 2022.  Prior to MVR cath was unremarkable per pt.  Pt has demonstrated elevated Troponin values in the past - cath performed 2023 with mild, non-obstructive CAD at that time despite troponin elevation.  Pt ha frequently been in the hospital with a variety of reasons, commonly with elevated troponin.  Values have been \"flat\" an atypical for injury.  Echo 11/24 in this context demonstrated NL EF.  Pt now presents with weakness.  States ambulation is limited by weakness.  No chest pain, pressure, heaviness, or tightness to suggest angina.  No SOB.  No palpitations.        Multiple hospitalizations in the past due to noncompliance with therapy.  Noncompliance with antihypertensive regimen and dialysis.  Was recently seen within the last 10 days a restriction cognitive GI bleed with rectal bleeding.  This was felt to be due to hemorrhoids and was discharged.  It was again noted at that time that he had skipped dialysis due to not feeling well.  He needed urgent dialysis during that admission.  Was here at [facility] just prior to that for hematochezia which was felt to be due to possible diverticular bleed/diverticulitis.  It was noted that he had a normal EGD and colonoscopy in August 2024 with a normal capsule study in October 2024.  Tagged RBC scan was negative and this was felt to be due to most likely diverticular bleed he had chronic elevated troponins without chest pains and was continued on dialysis.    Now he comes in after missing dialysis yesterday complaining of diffuse pain throughout his body.  He denies palpitations but is severely fatigued.   Denies hematochezia.      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[facility])    Congestive heart disease (HCC)    COPD (chronic obstructive pulmonary disease) ([facility])    Coronary atherosclerosis    Deep vein thrombosis ([facility])    at age 17 R/T cast    Depression    Diabetes ([facility])    Dialysis patient ([facility])    Diverticulosis of large intestine    Essential hypertension    3/21 echo: severe concentric LVH with normal EF and no MR or pHTN    Extrinsic asthma, unspecified    Heart attack ([facility])    2016- angiogram- no intervention    Heart valve disease    mitral valve repair in 1994/    High blood pressure    High cholesterol    History of blood transfusion    History of mitral valve repair    Hyperlipidemia    Low back pain    tight and stiff after sweeping and mopping    LVH (left ventricular hypertrophy)    Migraines    Mixed hyperlipidemia     HDL 38 LDL 97 VLDL 57     Monoclonal gammopathy    IgG kappa     Muscle weakness    MVP (mitral valve prolapse)    Repair 1994 at [place]; echoes as recently as 3/21 show mild or trivial MR and no stenosis    Neuropathy    Osteoarthritis    hip ,knees    Pneumonia due to organism    Pulmonary embolism ([facility])    Renal disorder    Stroke ([facility])    TIA (transient ischemic attack)    Initial history of left-sided weakness and slurred speech. (+) cocaine. MRI of the brain, CT angiogram of the head and neck, and 2D echo are all unremarkable.     TMJ (dislocation of temporomandibular joint)    Troponin level elevated    Trop 60 60 47 with TIA and no CP: Lexiscan negative with EF 51    Visual impairment       Social History:   Smoking:  None  Alcohol:  None    Family History:   No family history of premature arthrosclerotic heart  disease     Medications:   Scheduled:         Continuous Infusion:       PRN Medications:     nitroglycerin    Outpatient Medications:   Medications Ordered Prior to Encounter[1]    Allergies:   Allergies[2]    Review of Systems:   No fevers, chills, change in weight or bowel habits.  Ten point review of systems is otherwise negative or unremarkable.    Physical Exam:   Vitals:    01/18/25 1030   BP: (!) 151/98   Pulse: 107   Resp: (!) 30   Temp:      Wt Readings from Last 3 Encounters:   01/18/25 250 lb (113.4 kg)   01/07/25 244 lb 11.4 oz (111 kg)   01/06/25 245 lb (111.1 kg)           General: in distress   HEENT:   Normocephalic.  Atraumatic.  Eyes with no scleral icterus.  Neck: Supple.  No JVD.  Carotids 2+ and equal in symmetric fashion.  No bruits are noted.  Cardiac: Regular rate and rhythm.   There is a normal S1 and S2.  No S3 or S4.  No murmurs, rubs, or gallops.  PMI is non-displaced with a normal apical impulse.  Lungs: Clear to ascultation bilaterally.  No focal rales, rhonchi, or wheezes.  Good air movement is noted throughout all lung fields.  Abdomen: Soft.  Non-distended.  Non-tender.  Bowel sounds are present and normoactive.  No guarding or rebound.   Extremities: Extremities do not demonstrate any evidence of peripheral edema.   No cyanosis or clubbing of the digits is appreciated.  Femoral, Dorsalis Pedis, and Posterior Tibialis  pulses are 2+ and equal in a symmetric fashion.  Neurologic: Alert and oriented, normal affect.  No gross deficit appreciated.  Integument:  No visible rashes are appreciated.      Laboratories and Data:   Labs:    Recent Labs   Lab 01/18/25  0957   *   BUN 78*   CREATSERUM 11.17*   CA 8.3*   ALB 4.2      K 3.9      CO2 21.0   ALKPHO 76   AST 37*   ALT 30   BILT 0.4   TP 6.9         Recent Labs   Lab 01/18/25  0957   RBC 2.62*   HGB 8.5*   HCT 25.3*   MCV 96.6   MCH 32.4   MCHC 33.6   RDW 15.4   NEPRELIM 7.32   WBC 10.1   .0       No results  for input(s): \"PTP\", \"INR\" in the last 168 hours.    No results for input(s): \"TROP\", \"CK\" in the last 168 hours.    Diagnostics:   EKG shows sinus rhythm with LVH and ST and T the abnormalities likely due to strain.  Normalization of these abnormalities to baseline after given nitroglycerin and reduction in blood pressure          Yesenia Rubin MD           [1]   Current Facility-Administered Medications on File Prior to Encounter   Medication Dose Route Frequency Provider Last Rate Last Admin    [COMPLETED] acetaminophen (Tylenol Extra Strength) tab 1,000 mg  1,000 mg Oral Once Mary Lou Feldman, DO   1,000 mg at 01/07/25 0315    [COMPLETED] ketorolac (Toradol) 15 MG/ML injection 15 mg  15 mg Intravenous Once Mary Lou Feldman, DO   15 mg at 01/07/25 0315    [COMPLETED] ketorolac (Toradol) 30 MG/ML injection 30 mg  30 mg Intravenous Once Del Cordova MD   30 mg at 01/06/25 0615    [COMPLETED] acetaminophen (Tylenol Extra Strength) tab 1,000 mg  1,000 mg Oral Once Del Cordova MD   1,000 mg at 01/06/25 0736    [COMPLETED] HYDROmorphone (Dilaudid) 1 MG/ML injection        Given at 12/26/24 0427    [COMPLETED] iopamidol 76% (ISOVUE-370) injection for power injector  100 mL Intravenous ONCE PRN Gama Ray, DO   100 mL at 12/25/24 1730    [COMPLETED] HYDROmorphone (Dilaudid) 1 MG/ML injection 0.5 mg  0.5 mg Intravenous Once Gama Ray, DO   0.5 mg at 12/25/24 1852    [COMPLETED] HYDROmorphone (Dilaudid) 1 MG/ML injection 0.5 mg  0.5 mg Intravenous Once Vicky Weiss, DO   0.5 mg at 12/25/24 2249    [COMPLETED] epoetin sylvia (Epogen, Procrit) 74461 UNIT/ML injection 10,000 Units  10,000 Units Subcutaneous Once Lenka Bond MD   10,000 Units at 12/20/24 2008    [COMPLETED] heparin (Porcine) 1000 UNIT/ML injection 1,500 Units  1,500 Units Intracatheter Once Samaria Nava MD   1,500 Units at 12/20/24 2101    [COMPLETED] sodium ferric gluconate (Ferrlecit) 125 mg in sodium chloride 0.9% 100mL IVPB premix  125 mg  Intravenous Once Lenka Bond MD   Stopped at 24 194    [] sodium chloride 0.9 % IV bolus 100 mL  100 mL Intravenous Q30 Min PRN Lenka Bond MD        And    [] albumin human (Albumin) 25% injection 25 g  25 g Intravenous PRN Dialysis Lenka Bond MD        [] heparin (Porcine) 1000 UNIT/ML injection 1,500 Units  1.5 mL Intracatheter Once Lenka Bond MD        [COMPLETED] ondansetron (Zofran) 4 MG/2ML injection 4 mg  4 mg Intravenous Once Albert Rock MD   4 mg at 24 1144    [COMPLETED] pantoprazole (Protonix) 40 mg in sodium chloride 0.9% PF 10 mL IV push  40 mg Intravenous Once Albert Rock MD   40 mg at 24 1145    [COMPLETED] morphINE PF 2 MG/ML injection 2 mg  2 mg Intravenous Once Jhonny Rebolledo MD   2 mg at 24 0511    [] aspirin 81 MG chewable tab             [COMPLETED] aspirin chewable tab 324 mg  324 mg Oral Once Yesenia Rubin MD   324 mg at 24 1145    [COMPLETED] HYDROcodone-acetaminophen (Norco) 5-325 MG per tab 1 tablet  1 tablet Oral Once Minna Costello DO   1 tablet at 24 0847    [COMPLETED] heparin (Porcine) 1000 UNIT/ML injection 1,500 Units  1.5 mL Intracatheter Once Samaria Nava MD   1,500 Units at 24 1259    [COMPLETED] HYDROcodone-acetaminophen (Norco) 5-325 MG per tab 1 tablet  1 tablet Oral Once Jhonny Rebolledo MD   1 tablet at 24 1810    [] sodium chloride 0.9 % IV bolus 100 mL  100 mL Intravenous Q30 Min PRN Samaria Nava MD        And    [] albumin human (Albumin) 25% injection 25 g  25 g Intravenous PRN Dialysis Samaria Nava MD        [] sodium chloride 0.9 % IV bolus 100 mL  100 mL Intravenous Q30 Min PRN Samaria Nava MD        And    [] albumin human (Albumin) 25% injection 25 g  25 g Intravenous PRN Dialysis Samaria Nava MD        [COMPLETED] heparin (Porcine) 1000 UNIT/ML injection 1,500 Units  1.5 mL Intracatheter Once Brandy,  MD Samaria   1,500 Units at 24 2100    [COMPLETED] ondansetron (Zofran) 4 MG/2ML injection 4 mg  4 mg Intravenous Once Cata Mei APRN   4 mg at 24 1304    [COMPLETED] aspirin chewable tab 324 mg  324 mg Oral Once Maria Elena Mendoza DO   324 mg at 24 1357    [COMPLETED] HYDROmorphone (Dilaudid) 1 MG/ML injection 0.5 mg  0.5 mg Intravenous Q30 Min PRN Maria Elena Mendoza DO   0.5 mg at 24 1721    [COMPLETED] pantoprazole (Protonix) 40 mg in sodium chloride 0.9% PF 10 mL IV push  40 mg Intravenous Once Maria Elena Mendoza DO   40 mg at 24 1412    [] dextrose 5%-sodium chloride 0.45% infusion   Intravenous Continuous Maria Elena Mendoza DO        [COMPLETED] sodium chloride 0.9% infusion   Intravenous Once Maria Elena Mendoza  mL/hr at 24 1414 New Bag at 24 1414    [COMPLETED] labetalol (Trandate) 5 mg/mL injection 20 mg  20 mg Intravenous Once Maria Elena Mendoza DO   20 mg at 24 1622    [COMPLETED] hydrALAzine (Apresoline) 20 mg/mL injection 10 mg  10 mg Intravenous Once Ihsan Cassidy MD   10 mg at 24 1226    [COMPLETED] labetalol (Trandate) 5 mg/mL injection 20 mg  20 mg Intravenous Once Ihsan Cassidy MD   20 mg at 24 1359    [COMPLETED] HYDROmorphone (Dilaudid) 1 MG/ML injection 1 mg  1 mg Intravenous Once Ihsan Cassidy MD   1 mg at 24 1359    [COMPLETED] aspirin chewable tab 324 mg  324 mg Oral Once Ihsan Cassidy MD   324 mg at 24 1404    [] heparin (Porcine) 1000 UNIT/ML injection 1,500 Units  1.5 mL Intracatheter Once Lenka Bond MD        [COMPLETED] diphenhydrAMINE (Benadryl) 50 mg/mL  injection 12.5 mg  12.5 mg Intravenous Once Alghalith, Mohammad, MD   12.5 mg at 240    [COMPLETED] HYDROcodone-acetaminophen (Norco) 5-325 MG per tab 2 tablet  2 tablet Oral Once Gavi Charles MD   2 tablet at 10/25/24 0029    [COMPLETED] alteplase (Activase) 2 mg in sterile water for injection (PF) 2.2 mL IV  push to declot line  2 mg Intravenous Once Samaria Nava MD   2 mg at 10/21/24 1611    [COMPLETED] alteplase (Activase) 2 mg in sterile water for injection (PF) 2.2 mL IV push to declot line  2 mg Intravenous Once Samaria Nava MD   2 mg at 10/21/24 1611    [COMPLETED] heparin (Porcine) 1000 UNIT/ML injection 1,500 Units  1.5 mL Intracatheter Once Samaria Nava MD   1,500 Units at 10/22/24 0020     Current Outpatient Medications on File Prior to Encounter   Medication Sig Dispense Refill    lidocaine 5 % External Patch Place 1 patch onto the skin daily. 30 patch 0    calcium acetate 667 MG Oral Cap Take 1 capsule (667 mg total) by mouth daily.      cloNIDine 0.1 MG Oral Tab Take 1 tablet (0.1 mg total) by mouth nightly.      RENVELA 800 MG Oral Tab Take 3 tablets (2,400 mg total) by mouth 3 (three) times daily with meals.      hydrALAZINE 25 MG Oral Tab Take 1 tablet (25 mg total) by mouth 2 (two) times daily.      losartan 100 MG Oral Tab Take 1 tablet (100 mg total) by mouth daily. 30 tablet 0    gabapentin 100 MG Oral Cap Take 2 capsules (200 mg total) by mouth 3 (three) times daily. 180 capsule 0    [] NIFEdipine ER 30 MG Oral Tablet 24 Hr Take 1 tablet (30 mg total) by mouth daily. Hold if systolic blood pressure <130 30 tablet 0    VYVANSE 60 MG Oral Cap Take 1 capsule (60 mg total) by mouth every morning.      lamoTRIgine 100 MG Oral Tab Take 1 tablet (100 mg total) by mouth daily.      memantine 5 MG Oral Tab Take 1 tablet (5 mg total) by mouth 2 (two) times daily.      albuterol 108 (90 Base) MCG/ACT Inhalation Aero Soln Inhale 2 puffs into the lungs every 6 (six) hours as needed for Wheezing.      tamsulosin 0.4 MG Oral Cap Take 1 capsule (0.4 mg total) by mouth daily.      ARIPiprazole 15 MG Oral Tab Take 1 tablet (15 mg total) by mouth daily.      buPROPion  MG Oral Tablet 24 Hr Take 1 tablet (150 mg total) by mouth daily.      FLUoxetine HCl 40 MG Oral Cap Take 1 capsule (40 mg  total) by mouth daily. 7 capsule 0    carvedilol 25 MG Oral Tab Take 1 tablet (25 mg total) by mouth in the morning and 1 tablet (25 mg total) in the evening. Take with meals. 60 tablet 6    Fenofibrate 134 MG Oral Cap Take 1 capsule by mouth nightly. 90 capsule 1    Fluticasone Propionate 50 MCG/ACT Nasal Suspension SPRAY ONCE INTO EACH NOSTRIL BID PRN 15.8 mL 0   [2]   Allergies  Allergen Reactions    Hydrochlorothiazide RASH and HIVES

## 2025-01-18 NOTE — H&P
[facility] Hospitalist H&P/Consult note       CC:   Chief Complaint   Patient presents with    Difficulty Breathing    Body ache and/or chills        PCP: Adrina Small MD    History of Present Illness: Patient is a 44 year old male with PMH sig for CAD, ESRD on HD, HTN, chf, anemia, here for sob, diffuse body aches    Reports he has not been feeling well since yesterday. Reports generalized body aches,headaches, sinus drainage, cough, on / off temps / chills , sob  No n/v.   No sick contacts   Reports intermittent rectal bleeding. Does report bleeding yesterday   Missed HD yesterday      PMH  Past Medical History:    Anxiety state    Arrhythmia    Asthma ([facility])    Attention deficit hyperactivity disorder (ADHD)    Back problem    Bipolar 1 disorder ([facility])    CKD (chronic kidney disease) stage 3, GFR 30-59 ml/min ([facility])    Dr Meeks    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[facility])    Congestive heart disease ([facility])    COPD (chronic obstructive pulmonary disease) ([facility])    Coronary atherosclerosis    Deep vein thrombosis ([facility])    at age 17 R/T cast    Depression    Diabetes ([facility])    Dialysis patient ([facility])    Diverticulosis of large intestine    Essential hypertension    3/21 echo: severe concentric LVH with normal EF and no MR or pHTN    Extrinsic asthma, unspecified    Heart attack ([facility])    2016- angiogram- no intervention    Heart valve disease    mitral valve repair in 1994/    High blood pressure    High cholesterol    History of blood transfusion    History of mitral valve repair    Hyperlipidemia    Low back pain    tight and stiff after sweeping and mopping    LVH (left ventricular hypertrophy)    Migraines    Mixed hyperlipidemia     HDL 38 LDL 97 VLDL 57     Monoclonal gammopathy    IgG kappa     Muscle weakness    MVP (mitral valve prolapse)    Repair 1994 at [place]; echoes as recently as 3/21 show mild or trivial MR and no stenosis    Neuropathy    Osteoarthritis    hip ,knees    Pneumonia due to organism     Pulmonary embolism ([facility])    Renal disorder    Stroke (HCC)    TIA (transient ischemic attack)    Initial history of left-sided weakness and slurred speech. (+) cocaine. MRI of the brain, CT angiogram of the head and neck, and 2D echo are all unremarkable.     TMJ (dislocation of temporomandibular joint)    Troponin level elevated    Trop 60 60 47 with TIA and no CP: Lexiscan negative with EF 51    Visual impairment        PSH  Past Surgical History:   Procedure Laterality Date    Av fistula revision, open Left     Cabg      Colonoscopy N/A 2023    Procedure: COLONOSCOPY;  Surgeon: Heath Vu MD;  Location:  ENDOSCOPY    Colonoscopy N/A 2023    Procedure: COLONOSCOPY with cold snare polypectomy and forcep polypectomy;  Surgeon: Ousmane Suarez MD;  Location:  ENDOSCOPY    Colonoscopy & polypectomy      Egd  2019    Duodenitis. Biopsied. EUS for weight loss was negative    Heart surgery      Hernia surgery  2022    Dr Barnes    Laminectomy,>2 sgmt,lumbar  2018    L4-L5 Decomp Discectomy ROEM L4-L5    Mitralplasty w cp bypass      [facility]: Repair    Repair rotator cuff,chronic Left     torn and had a ruptured bicep    Sinus surgery        Spine surgery procedure unlisted      Valve repair      mitral valve        ALL:  Allergies[1]     Home Medications:  Medications Taking[2]      Soc Hx  Social History     Tobacco Use    Smoking status: Former     Current packs/day: 0.00     Average packs/day: 1 pack/day for 27.0 years (27.0 ttl pk-yrs)     Types: Cigarettes     Start date: 1995     Quit date: 2022     Years since quittin.8     Passive exposure: Never    Smokeless tobacco: Never   Substance Use Topics    Alcohol use: No        Fam Hx  Family History   Problem Relation Age of Onset    Hypertension Father     Alcohol and Other Disorders Associated Father     Substance Abuse Father         cocaine    Dementia Father     Cancer Father         lung    Diabetes  Mother     Cancer Mother         multiple myeloma    Hypertension Mother     Anxiety Maternal Aunt     Depression Maternal Aunt     Anxiety Maternal Aunt     Depression Maternal Aunt     Bipolar Disorder Maternal Aunt     Diabetes Maternal Grandmother     Hypertension Maternal Grandmother     Cancer Maternal Grandfather         stomach cancer    Diabetes Maternal Grandfather     Hypertension Maternal Grandfather     Alcohol and Other Disorders Associated Maternal Grandfather     Hypertension Paternal Grandmother     Hypertension Paternal Grandfather     Cancer Sister         uterine and ovarian    Hypertension Sister     Cancer Maternal Uncle         lung    Cancer Paternal Aunt         throat       Review of Systems  Comprehensive ROS reviewed and negative except for what's stated above.         OBJECTIVE:  BP (!) 151/99   Pulse 112   Temp 98.6 °F (37 °C) (Oral)   Resp (!) 29   Ht 6' 2\" (1.88 m)   Wt 250 lb (113.4 kg)   SpO2 91%   BMI 32.10 kg/m²   General:  Alert, appears in pain   Head:  Normocephalic,     Eyes:  Sclera anicteric    Throat: dry   Neck: Supple,    Lungs:   Clear to auscultation bilaterally. Normal effort   Chest wall:  No tenderness or deformity.   Heart:  tachycardic   Abdomen:   Soft, NT/ND    Extremities: Atraumatic,    Skin: No visible rashes or lesions.    Neurologic: Normal strength, no focal deficit appreciated     Diagnostic Data:    CBC/Chem  Recent Labs   Lab 01/18/25  0957   WBC 10.1   HGB 8.5*   MCV 96.6   .0       Recent Labs   Lab 01/18/25  0957      K 3.9      CO2 21.0   BUN 78*   CREATSERUM 11.17*   *   CA 8.3*       Recent Labs   Lab 01/18/25  0957   ALT 30   AST 37*   ALB 4.2       No results for input(s): \"TROP\" in the last 168 hours.    Additional Diagnostics: ECG: sinus    CXR: image personally reviewed     Radiology: CARD ECHO LIMITED (CPT=93308/80307/14955)    Result Date: 1/18/2025  Transthoracic Echocardiogram Name:Godwin Fonseca Date:  2025 :  1980 Ht:  (74in)  BP: 151 / 98 MRN:  [id number]    Age:  44years    Wt:  (250lb) HR: 106bpm Loc:  [facility]       Gndr: M          BSA: 2.39m^2 Sonographer: Lavern GIBBS Ordering:    Deanna Cervantes Consulting:  Mathew Elizabeth ---------------------------------------------------------------------------- History/Indications:  Evaluate heart function. Mitral valve replacement. PMH:   Myocardial infarction. ---------------------------------------------------------------------------- Procedure information:  A transthoracic echocardiogram, limited study was performed. Additional evaluation included M-mode, limited 2D, limited spectral Doppler, and color Doppler.  Patient status:  Inpatient.  Location:  Room .    Comparison was made to the study of 2024.    This was a STAT study. Transthoracic echocardiography for ventricular function evaluation and assessment of valvular function. Image quality was adequate. ECG rhythm:   Sinus tachycardia ---------------------------------------------------------------------------- Conclusions: 1. Left ventricle: The cavity size was normal. Wall thickness was moderately    increased. There was concentric hypertrophy. Systolic function was    normal. The estimated ejection fraction was 60-65%, by visual assessment. 2. Left atrium: The left atrial volume was markedly increased. 3. Right atrium: The atrium was mildly dilated. 4. Mitral valve: A prosthetic device is present. The prosthesis had a normal    range of motion. The sewing ring appeared normal, had no rocking motion,    and showed no evidence of dehiscence. Leaflet separation was mildly    reduced. Transvalvular velocity was increased. The findings were    consistent with mild stenosis. There was moderate regurgitation. The mean    diastolic gradient was 10mm Hg at a heart rate 108 bpm. * ---------------------------------------------------------------------------- * Findings: Left ventricle:  The cavity size  was normal. Wall thickness was moderately increased. There was concentric hypertrophy. Systolic function was normal. The estimated ejection fraction was 60-65%, by visual assessment. Left atrium:  The atrium was markedly dilated. The left atrial volume was markedly increased. Right ventricle:  The cavity size was normal. Systolic function was normal. Right atrium:  The atrium was mildly dilated. Mitral valve:  A prosthetic device is present. The prosthesis had a normal range of motion. The sewing ring appeared normal, had no rocking motion, and showed no evidence of dehiscence. Leaflet separation was mildly reduced. Doppler:  Transvalvular velocity was increased. The findings were consistent with mild stenosis. There was moderate regurgitation.    The mean diastolic gradient was 10mm Hg at a heart rate 108 bpm. Aortic valve:   The valve was trileaflet. The leaflets were mildly calcified. Cusp separation was normal. Tricuspid valve:  The valve is structurally normal. Leaflet separation was normal. Pulmonic valve:   The valve is structurally normal. Cusp separation was normal. Pericardium:   There was no pericardial effusion. Aorta: Aortic root: The aortic root was normal. Ascending aorta: The ascending aorta was normal. Systemic veins:  Central venous respirophasic diameter changes are in the normal range (>50%). Inferior vena cava: The IVC was normal-sized. ---------------------------------------------------------------------------- Measurements  Left ventricle         Value        Ref       03/10/2024  IVS thickness, ED, (H) 1.7   cm     0.6 - 1.0 1.8  PLAX  LV ID, ED, PLAX        4.9   cm     4.2 - 5.8 4.3  LV ID, ES, PLAX        3.3   cm     2.5 - 4.0 2.9  LV PW thickness,   (H) 1.5   cm     0.6 - 1.0 1.5  ED, PLAX  IVS/LV PW ratio,       1.14         --------- 1.20  ED, PLAX  LV PW/LV ID ratio,     0.3          --------- 0.36  ED, PLAX  LV ejection            63    %      52 - 72   61  fraction  Ascending aorta         Value        Ref       03/10/2024  Ascending aorta        3.5   cm     2.2 - 3.8 ----------  ID, A-P, ED  Left atrium            Value        Ref       03/10/2024  LA ID, A-P, ES     (H) 6.1   cm     3.0 - 4.0 ----------  LA volume, S       (H) 175   ml     18 - 58   ----------  LA volume/bsa, S   (H) 73    ml/m^2 16 - 34   ----------  LA volume, ES, 1-p (H) 173   ml     18 - 58   ----------  A4C  LA volume, ES, 1-p (H) 176   ml     18 - 58   ----------  A2C  LA volume, ES, A/L     189   ml     --------- ----------  LA volume/bsa, ES, (H) 79    ml/m^2 16 - 34   ----------  A/L  Mitral valve           Value        Ref       03/10/2024  Mitral mean            10    mm Hg  --------- 2  gradient, D  Mitral peak            18    mm Hg  --------- 4  gradient, D  Mitral regurg VTI,     34.2  cm     --------- 24.8  PISA  Right ventricle        Value        Ref       03/10/2024  TAPSE, 2D              1.97  cm     >=1.70    ---------- Legend: (L)  and  (H)  fahad values outside specified reference range. ---------------------------------------------------------------------------- Prepared and electronically signed by Yesenia Rubin MD 01/18/2025 11:34     XR CHEST AP PORTABLE  (CPT=71045)    Result Date: 1/18/2025  PROCEDURE:  XR CHEST AP PORTABLE  (CPT=71045)  TECHNIQUE:  AP chest radiograph was obtained.  COMPARISON:  [facility], XR, XR CHEST AP PORTABLE  (CPT=71045), 1/07/2025, 3:01 AM.  INDICATIONS:  perla  PATIENT STATED HISTORY: (As transcribed by Technologist)  PERLA, chills, body aches, and cough today.    FINDINGS:   Right lower lobe consolidation is increased.  Right-sided central line with tip in the SVC.  No pneumothorax.  Sequelae of valve replacement is noted.  Cardiac silhouette is stable.  Pulmonary vasculature demonstrates minimal apically redistribution.  No pneumothorax.  Increased right pleural effusion.            CONCLUSION:  Increased right lower lobe consolidation right pleural effusion.    LOCATION:  [facility]      Dictated by (CST): Ryan Hernández MD on 1/18/2025 at 10:52 AM     Finalized by (CST): Ryan Hernández MD on 1/18/2025 at 10:52 AM       XR CHEST AP PORTABLE  (CPT=71045)    Result Date: 1/7/2025  PROCEDURE:  XR CHEST AP PORTABLE  (CPT=71045)  TECHNIQUE:  AP chest radiograph was obtained.  COMPARISON:  [facility], XR, XR CHEST AP PORTABLE  (CPT=71045), 12/17/2024, 11:47 AM.  INDICATIONS:  Rectal bleeding since yesterday morning. Abdominal pain and chest pain. Patient was just seen yesterday morning with the same symptoms. Patient states, I missed  PATIENT STATED HISTORY: (As transcribed by Technologist)  Upper left chest pain.              CONCLUSION:  Preliminary reading provided by radiologist from [facility].  Little change since the prior.  Stable cardiac enlargement mild.  Stable central venous catheter.  Mild atelectasis right base stable.  No sizable effusion.  No pneumothorax.  No sign of CHF. Consider upright PA and lateral examination of the chest, when tolerated.   LOCATION:  [facility]      Dictated by (CST): Joe London MD on 1/07/2025 at 8:36 AM     Finalized by (CST): Joe London MD on 1/07/2025 at 8:36 AM       CT ABDOMEN+PELVIS(CPT=74176)    Result Date: 1/6/2025  PROCEDURE:  CT ABDOMEN+PELVIS (CPT=74176)  [facility]BDOMEN+PELVIS(CONTRAST ONLY)(CPT=74177), 12/25/2024, 5:09 PM.  INDICATIONS:  rectal bleeding, abd pain, cp  TECHNIQUE:  Unenhanced multislice CT scanning was performed from the dome of the diaphragm to the pubic symphysis.  Dose reduction techniques were used. Dose information is transmitted to the ACR (American College of Radiology) NRDR (National Radiology Data Registry) which includes the Dose Index Registry.  PATIENT STATED HISTORY: (As transcribed by Technologist)  Rectal bleeding, abd pain, low GFR    FINDINGS:  Please note the exam is somewhat limited without intravenous or oral contrast.  LIVER:  No enlargement,  atrophy, abnormal density, or significant focal lesion.  BILIARY:  No visible dilatation or calcification.  PANCREAS:  No lesion, fluid collection, ductal dilatation, or atrophy.  SPLEEN:  No enlargement or focal lesion.  KIDNEYS:  No mass, obstruction, or calcification.  ADRENALS:  No mass or enlargement.  AORTA/VASCULAR:    Unremarkable as seen on non-contrast imaging. RETROPERITONEUM:  No mass or adenopathy.  BOWEL/MESENTERY:  There is a normal-appearing appendix.  No dilated loops of small bowel are seen.  Portions of the bowel are decompressed, limiting assessment.  Previously questioned wall thickening in the region of the colon is not as well appreciated on today's exam.  There may be some residual wall thickening such is in the region of the descending colon and sigmoid colon.  Some of this may be due to incomplete distension.  No free fluid is seen.  Lack of intravenous and oral contrast somewhat limits assessment. ABDOMINAL WALL:  No mass or hernia.  URINARY BLADDER:  There is probable circumferential mural thickening of the urinary bladder.  This is suspicious for cystitis. PELVIC NODES:  No adenopathy.  PELVIC ORGANS:  No visible mass.  Pelvic organs appropriate for patient age.  BONES:  No bony lesion or fracture.  LUNG BASES:  Linear opacities within the lungs likely represent areas of scarring or atelectasis. OTHER:  Negative.             CONCLUSION:  There is some questionable circumferential mural thickening of the colon.  This is better appreciated on the prior exam.  Some of this may be due to incomplete distention and lack of contrast.  A nonspecific colitis is difficult to exclude.  Probable circumferential mural thickening of the urinary bladder may represent cystitis.   LOCATION:  [id number]   Dictated by (CST): Smith Randle MD on 1/06/2025 at 6:47 AM     Finalized by (CST): Smith Randle MD on 1/06/2025 at 6:54 AM       CT ABDOMEN+PELVIS(CONTRAST ONLY)(CPT=74177)    Result Date:  12/25/2024  PROCEDURE:  CT ABDOMEN+PELVIS (CONTRAST ONLY) (CPT=74177)  COMPARISON:  [facility], CT, CT ABDOMEN+PELVIS(CPT=74176), 11/16/2024, 2:33 PM.  INDICATIONS:  rectal bleeding  TECHNIQUE:  CT scanning was performed from the dome of the diaphragm to the pubic symphysis with non-ionic intravenous contrast material. Post contrast coronal MPR imaging was performed.  Dose reduction techniques were used. Dose information is transmitted to the ACR (American College of Radiology) NRDR (National Radiology Data Registry) which includes the Dose Index Registry.  PATIENT STATED HISTORY:(As transcribed by Technologist)  Rectal bleeding   CONTRAST USED:  100cc of Isovue 370  FINDINGS: LUNG BASE:  Partially imaged scarring in the right lower lung with associated pleural thickening.  Small right pleural effusion.  Mitral valve prosthesis noted.  Elevation of the right diaphragm. LIVER:  Unremarkable. BILIARY:  Unremarkable. SPLEEN:  Unremarkable. PANCREAS:  Unremarkable. ADRENALS:  Nonspecific adrenal thickening KIDNEYS:  Subcentimeter hypodensities in the kidneys measuring up to 8 mm are too small to further characterize and warrant no specific follow-up. AORTA/VASCULAR:  Unremarkable. RETROPERITONEUM:  Unremarkable. BOWEL/MESENTERY:  There is mild diffuse colonic wall thickening that is concerning for nonspecific colitis.  Clinical correlation recommended.  Uncomplicated colonic diverticulosis.  Scattered feces in the colon.  No large or small bowel dilatation.  No free air or free fluid. ABDOMINAL WALL:  Unremarkable. PELVIC ORGANS:  Urinary bladder wall thickening with fatty induration is concerning for cystitis.  Clinical correlation recommended.  Prostate calcification.  Pelvic phleboliths. LYMPH NODES:  No lymphadenopathy in the abdomen or pelvis. BONES:  Degenerative changes in the spine OTHER:  None.            CONCLUSION:   1. Findings concerning for nonspecific colitis.  Clinical correlation recommended.  2.  Uncomplicated colonic diverticulosis.  3. Urinary bladder wall thickening with fatty induration is concerning for cystitis.  Clinical correlation recommended.   4. Small right pleural effusion.  Please see above for further details.  LOCATION:  [facility]   Dictated by (CST): Jj Conley MD on 12/25/2024 at 5:33 PM     Finalized by (CST): Jj Conley MD on 12/25/2024 at 5:36 PM       NM GI BLOOD LOSS STUDY SPECT/CT (SINGLE) 1 DAY (CPT=78278/76279)    Result Date: 12/19/2024  PROCEDURE:  NM GI BLOOD LOSS STUDY SPECT/CT(SINGLE) 1 DAY (CPT=78278/21308)  COMPARISON:  None.  INDICATION:  Bright red blood per rectum  TECHNIQUE:  26.1 mCi Tc99m autologously labelled RBC's were re-injected intravenously, and dynamic images of the abdomen were obtained in the anterior projection for at least one hour. Additional dedicated SPECT images were taken with concurrent CT scan for both anatomical localization as well as attenuation correction. Scan was reformatted into multi-planar reconstructions on a dedicated workstation.  FINDINGS:  No areas of abnormal radiotracer uptake are seen.  No radiotracer uptake seen within the expected region of the bowel.  There is some questionable wall thickening of the urinary bladder although some of this may be due to incomplete distension.  If clinical symptoms persist then consider follow-up imaging or direct visualization if not already performed.            CONCLUSION:  See above.   LOCATION:  [id number]      Dictated by (CST): Smith Randle MD on 12/19/2024 at 5:36 PM     Finalized by (CST): Smith Randle MD on 12/19/2024 at 5:38 PM          ASSESSMENT / PLAN:     Patient is a 44 year old male with PMH sig for CAD, ESRD on HD, HTN, chf, anemia, here for sob, diffuse body aches    Impression    -abnormal EKG - CARYN in V3, not meeting criteria for STEMI  -elevated troponin - chronic   -HTN  -non obstructive CAD on angiogram 2024  -sp MV repair 1994 and 2022  -chronic d CHF, volume management with  HD    -anemia, acute on chronic   -ESRD on HD  -bipolar do      Plan    *ID / Pulm  -pt with URI like symptoms. Rapid covid /flu neg, will check extended rvp  -CXR with RLL consolidation. Will start  abx  -check urine legionella / strep / sputum cx if able  - blood cx     *CV  -stat bedside echo reportedly ok, offical read pending  -trend trop / EKG  -if worsening symptoms and uptrending trop / changes in EKG may need angiogram  -sp aspirin in ER, cont  -cont rest of cardiac meds    *GI  -hgb lower than recently  -trend hgb  -recent admission for hematochezia, had neg tagged bleeding scan. Was also at [facility] - saw GI, though to have hemorrhoidal bleeding  -check iron stores   -transfuse to keep > 8 given his cardiac symptoms and dropping hgb will give 1 unit with HD  -GI eval    *renal  -dw renal, plan for HD today    *chronic conditions   -home meds as able    Scds      Further recommendations pending patient's clinical course. Kevin hospitalist to continue to follow patient while in house    Patient and/or patient's family given opportunity to ask questions and note understanding and agreeing with therapeutic plan as outlined    Mathew Brown Hospitalist  [phone]  Answering Service: [phone]           [1]   Allergies  Allergen Reactions    Hydrochlorothiazide RASH and HIVES   [2]   No outpatient medications have been marked as taking for the 1/18/25 encounter (Hospital Encounter).

## 2025-01-18 NOTE — SPIRITUAL CARE NOTE
Spiritual Care Visit Note    Patient Name: Godwin Fonseca Date of Spiritual Care Visit: 25   : 1980 Primary Dx: <principal problem not specified>       Referred By:      Spiritual Care Taxonomy:    Intended Effects: Helping someone feel comforted    Methods: Offer emotional support    Interventions: Share written prayer;Explain  role    Visit Type/Summary:     - Spiritual Care: Offered empathic listening and emotional support. Patient and family expressed appreciation for  visit. Provided information regarding how to contact Spiritual Care and left a Spiritual Care information card.  asked if Godwin was feeling anxious and he said no.  provided a written prayer to find inner strength.  remains available as needed for follow up.    Spiritual Care support can be requested via an Epic consult. For urgent/immediate needs, please contact the On Call  at: [facility]: [phone]

## 2025-01-18 NOTE — ED QUICK NOTES
Orders for admission, patient is aware of plan and ready to go upstairs. Any questions, please call [facility] JASON Leos at extension [phone].     Patient Covid vaccination status: Fully vaccinated     COVID Test Ordered in ED: SARS-CoV-2/Flu A and B/RSV by PCR (GeneXpert)    COVID Suspicion at Admission: N/A    Running Infusions:  None    Mental Status/LOC at time of transport: A&O x3    Other pertinent information:   CIWA score: N/A   NIH score:  N/A

## 2025-01-19 ENCOUNTER — APPOINTMENT (OUTPATIENT)
Dept: CT IMAGING | Facility: HOSPITAL | Age: 45
End: 2025-01-19
Attending: HOSPITALIST
Payer: MEDICARE

## 2025-01-19 LAB
ANION GAP SERPL CALC-SCNC: 12 MMOL/L (ref 0–18)
BUN BLD-MCNC: 32 MG/DL (ref 9–23)
CALCIUM BLD-MCNC: 8.6 MG/DL (ref 8.7–10.6)
CHLORIDE SERPL-SCNC: 102 MMOL/L (ref 98–112)
CO2 SERPL-SCNC: 24 MMOL/L (ref 21–32)
CREAT BLD-MCNC: 6.79 MG/DL
EGFRCR SERPLBLD CKD-EPI 2021: 9 ML/MIN/1.73M2 (ref 60–?)
ERYTHROCYTE [DISTWIDTH] IN BLOOD BY AUTOMATED COUNT: 15.4 %
GLUCOSE BLD-MCNC: 105 MG/DL (ref 70–99)
GLUCOSE BLD-MCNC: 124 MG/DL (ref 70–99)
GLUCOSE BLD-MCNC: 133 MG/DL (ref 70–99)
GLUCOSE BLD-MCNC: 93 MG/DL (ref 70–99)
HCT VFR BLD AUTO: 29.6 %
HGB BLD-MCNC: 9.9 G/DL
MAGNESIUM SERPL-MCNC: 1.8 MG/DL (ref 1.6–2.6)
MCH RBC QN AUTO: 32 PG (ref 26–34)
MCHC RBC AUTO-ENTMCNC: 33.4 G/DL (ref 31–37)
MCV RBC AUTO: 95.8 FL
OSMOLALITY SERPL CALC.SUM OF ELEC: 293 MOSM/KG (ref 275–295)
PLATELET # BLD AUTO: 206 10(3)UL (ref 150–450)
POTASSIUM SERPL-SCNC: 3.6 MMOL/L (ref 3.5–5.1)
RBC # BLD AUTO: 3.09 X10(6)UL
SODIUM SERPL-SCNC: 138 MMOL/L (ref 136–145)
WBC # BLD AUTO: 10 X10(3) UL (ref 4–11)

## 2025-01-19 PROCEDURE — 87040 BLOOD CULTURE FOR BACTERIA: CPT | Performed by: HOSPITALIST

## 2025-01-19 PROCEDURE — 94760 N-INVAS EAR/PLS OXIMETRY 1: CPT

## 2025-01-19 PROCEDURE — 80048 BASIC METABOLIC PNL TOTAL CA: CPT | Performed by: HOSPITALIST

## 2025-01-19 PROCEDURE — 70450 CT HEAD/BRAIN W/O DYE: CPT | Performed by: HOSPITALIST

## 2025-01-19 PROCEDURE — 85027 COMPLETE CBC AUTOMATED: CPT | Performed by: HOSPITALIST

## 2025-01-19 PROCEDURE — 82962 GLUCOSE BLOOD TEST: CPT

## 2025-01-19 PROCEDURE — 83735 ASSAY OF MAGNESIUM: CPT | Performed by: HOSPITALIST

## 2025-01-19 RX ORDER — FENOFIBRATE 134 MG/1
134 CAPSULE ORAL NIGHTLY
Status: DISCONTINUED | OUTPATIENT
Start: 2025-01-19 | End: 2025-01-22

## 2025-01-19 RX ORDER — LOSARTAN POTASSIUM 100 MG/1
100 TABLET ORAL DAILY
Status: DISCONTINUED | OUTPATIENT
Start: 2025-01-19 | End: 2025-01-22

## 2025-01-19 RX ORDER — LABETALOL HYDROCHLORIDE 5 MG/ML
10 INJECTION, SOLUTION INTRAVENOUS EVERY 4 HOURS PRN
Status: DISCONTINUED | OUTPATIENT
Start: 2025-01-19 | End: 2025-01-22

## 2025-01-19 RX ORDER — CARVEDILOL 12.5 MG/1
25 TABLET ORAL 2 TIMES DAILY WITH MEALS
Status: DISCONTINUED | OUTPATIENT
Start: 2025-01-19 | End: 2025-01-22

## 2025-01-19 RX ORDER — DEXTROAMPHETAMINE SACCHARATE, AMPHETAMINE ASPARTATE, DEXTROAMPHETAMINE SULFATE AND AMPHETAMINE SULFATE 1.25; 1.25; 1.25; 1.25 MG/1; MG/1; MG/1; MG/1
10 TABLET ORAL
Status: DISCONTINUED | OUTPATIENT
Start: 2025-01-19 | End: 2025-01-22

## 2025-01-19 RX ORDER — HYDRALAZINE HYDROCHLORIDE 20 MG/ML
10 INJECTION INTRAMUSCULAR; INTRAVENOUS EVERY 4 HOURS PRN
Status: DISCONTINUED | OUTPATIENT
Start: 2025-01-19 | End: 2025-01-22

## 2025-01-19 RX ORDER — METHOCARBAMOL 500 MG/1
500 TABLET, FILM COATED ORAL ONCE
Status: DISCONTINUED | OUTPATIENT
Start: 2025-01-19 | End: 2025-01-22

## 2025-01-19 RX ORDER — ISOSORBIDE MONONITRATE 30 MG/1
30 TABLET, EXTENDED RELEASE ORAL DAILY
Status: DISCONTINUED | OUTPATIENT
Start: 2025-01-19 | End: 2025-01-22

## 2025-01-19 RX ORDER — NIFEDIPINE 30 MG/1
30 TABLET, EXTENDED RELEASE ORAL DAILY
Status: DISCONTINUED | OUTPATIENT
Start: 2025-01-19 | End: 2025-01-22

## 2025-01-19 RX ORDER — FLUTICASONE PROPIONATE 50 MCG
1 SPRAY, SUSPENSION (ML) NASAL DAILY
Status: DISCONTINUED | OUTPATIENT
Start: 2025-01-20 | End: 2025-01-22

## 2025-01-19 NOTE — PROGRESS NOTES
[facility]  Report of Consultation    Godwin Fonseca Patient Status:  Observation    1980 MRN [id number]   Location [facility] 7NE-A Attending Yuriy Forrest MD   Hosp Day # 1 PCP Adrian Small MD         Assessment:    Diffuse pain throughout his body including head, ear, neck, shoulder, chest and abdomen.  EKG with mild ST elevation in V3 not meeting criteria for STEMI without reciprocal changes- improved with BP management   Chronic troponin elevation.  Currently 516. Downtrended to 493  As high as 2000 in the past  ESRD on dialysis.  Noncompliance  Hypertension: Noncompliance with antihypertensives  Tachycardida-coreg wasn't rsumed yesterday. Restarted this AM  Bipolar  Nonobstructive minimal CAD on angiogram done in   Status post mitral valve repair in  with redo robotic assisted mitral valve repair in       Plan:  EKG not meeting strict criteria for STEMI and patient has history of ST changes due to LVH.  Troponin is more elevated than previous admission but he has missed dialysis.  It is lower than his peak of 2000.  Repeat EKG once blood pressure came down shows normalization without significant ST changes from baseline.  Echo stable. Preliminary echo at bedside shows normal LV function without distinct wall motion abnormalities I.  Given normalization of EKG with blood pressure and normal LVEF we will hold off on cardiac catheterization for now  Resume imdur  Monitor anemia which could be exacerbating chest pain in the setting of hypertension  Continue p.o. hydralazine 25 3 times daily.  Goal blood pressure less than 150/90 given chronically elevated numbers and historic noncompliance with medications  Resume nifedipine 30 mg daily, and carvedilol 25 mg twice daily- not ordered yesterday   Will monitor closely.  If significant changes in EKG and or elevations in troponin with worsening chest pains we will consider angiogram  Will need to monitor anemia as this is likely  contributing to his fatigue  I again explained the importance of needing to commit to dialysis and not miss sessions.  He is a high risk for readmission patient with multiple admissions due to noncompliance with therapy.    Reason for Consultation:   Chest pain     History of Present Illness:   Last admission: Godwin Fonseca is a(n) 44 year old male with a past history of HTN, DVT / PE 1/23, Bipolar disorder, and valvular heart disease. Pt underwent MVR in 1994 and redo with robotically assisted MVR 2022.  Prior to MVR cath was unremarkable per pt.  Pt has demonstrated elevated Troponin values in the past - cath performed 2023 with mild, non-obstructive CAD at that time despite troponin elevation.  Pt ha frequently been in the hospital with a variety of reasons, commonly with elevated troponin.  Values have been \"flat\" an atypical for injury.  Echo 11/24 in this context demonstrated NL EF.  Pt now presents with weakness.  States ambulation is limited by weakness.  No chest pain, pressure, heaviness, or tightness to suggest angina.  No SOB.  No palpitations.        Multiple hospitalizations in the past due to noncompliance with therapy.  Noncompliance with antihypertensive regimen and dialysis.  Was recently seen within the last 10 days a restriction cognitive GI bleed with rectal bleeding.  This was felt to be due to hemorrhoids and was discharged.  It was again noted at that time that he had skipped dialysis due to not feeling well.  He needed urgent dialysis during that admission.  Was here at [facility] just prior to that for hematochezia which was felt to be due to possible diverticular bleed/diverticulitis.  It was noted that he had a normal EGD and colonoscopy in August 2024 with a normal capsule study in October 2024.  Tagged RBC scan was negative and this was felt to be due to most likely diverticular bleed he had chronic elevated troponins without chest pains and was continued on dialysis.    Now he comes in  after missing dialysis yesterday complaining of diffuse pain throughout his body.  He denies palpitations but is severely fatigued.  Denies hematochezia.      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[facility])    Congestive heart disease ([facility])    COPD (chronic obstructive pulmonary disease) ([facility])    Coronary atherosclerosis    Deep vein thrombosis ([facility])    at age 17 R/T cast    Depression    Diabetes ([facility])    Dialysis patient ([facility])    Diverticulosis of large intestine    Essential hypertension    3/21 echo: severe concentric LVH with normal EF and no MR or pHTN    Extrinsic asthma, unspecified    Heart attack ([facility])    2016- angiogram- no intervention    Heart valve disease    mitral valve repair in 1994/    High blood pressure    High cholesterol    History of blood transfusion    History of mitral valve repair    Hyperlipidemia    Low back pain    tight and stiff after sweeping and mopping    LVH (left ventricular hypertrophy)    Migraines    Mixed hyperlipidemia     HDL 38 LDL 97 VLDL 57     Monoclonal gammopathy    IgG kappa     Muscle weakness    MVP (mitral valve prolapse)    Repair 1994 at [place]; echoes as recently as 3/21 show mild or trivial MR and no stenosis    Neuropathy    Osteoarthritis    hip ,knees    Pneumonia due to organism    Pulmonary embolism ([facility])    Renal disorder    Stroke ([facility])    TIA (transient ischemic attack)    Initial history of left-sided weakness and slurred speech. (+) cocaine. MRI of the brain, CT angiogram of the head and neck, and 2D echo are all unremarkable.     TMJ (dislocation of temporomandibular joint)    Troponin level elevated    Trop 60 60 47 with TIA and no CP: Lexiscan negative with EF 51    Visual impairment        Social History:   Smoking:  None  Alcohol:  None    Family History:   No family history of premature arthrosclerotic heart disease     Medications:   Scheduled:    methocarbamol  500 mg Oral Once    losartan  100 mg Oral Daily    cloNIDine  0.1 mg Oral BID    hydrALAZINE  25 mg Oral Q8H MARTHA    sodium chloride   Intravenous Once    cefTRIAXone  2 g Intravenous Q24H    doxycycline  100 mg Oral BID    ARIPiprazole  15 mg Oral Daily    buPROPion ER  150 mg Oral Daily    calcium acetate  667 mg Oral Daily    FLUoxetine HCl  40 mg Oral Daily    gabapentin  200 mg Oral TID    lamoTRIgine  100 mg Oral Daily    memantine  5 mg Oral BID    sevelamer carbonate  2,400 mg Oral TID CC    tamsulosin  0.4 mg Oral Daily         Continuous Infusion:       PRN Medications:     nitroglycerin    albuterol    HYDROcodone-acetaminophen    acetaminophen    Outpatient Medications:   Medications Ordered Prior to Encounter[1]    Allergies:   Allergies[2]    Review of Systems:   No fevers, chills, change in weight or bowel habits.  Ten point review of systems is otherwise negative or unremarkable.    Physical Exam:   Vitals:    01/19/25 0609   BP: (!) 171/113   Pulse: 114   Resp: 24   Temp: 100.4 °F (38 °C)     Wt Readings from Last 3 Encounters:   01/19/25 242 lb 15.2 oz (110.2 kg)   01/07/25 244 lb 11.4 oz (111 kg)   01/06/25 245 lb (111.1 kg)           General: in distress   HEENT:   Normocephalic.  Atraumatic.  Eyes with no scleral icterus.  Neck: Supple.  No JVD.  Carotids 2+ and equal in symmetric fashion.  No bruits are noted.  Cardiac: Regular rate and rhythm.   There is a normal S1 and S2.  No S3 or S4.  No murmurs, rubs, or gallops.  PMI is non-displaced with a normal apical impulse.  Lungs: Clear to ascultation bilaterally.  No focal rales, rhonchi, or wheezes.  Good air movement is noted throughout all lung fields.  Abdomen: Soft.  Non-distended.  Non-tender.  Bowel sounds are present and normoactive.  No guarding or rebound.    Extremities: Extremities do not demonstrate any evidence of peripheral edema.   No cyanosis or clubbing of the digits is appreciated.  Femoral, Dorsalis Pedis, and Posterior Tibialis  pulses are 2+ and equal in a symmetric fashion.  Neurologic: Alert and oriented, normal affect.  No gross deficit appreciated.  Integument:  No visible rashes are appreciated.      Laboratories and Data:   Labs:    Recent Labs   Lab 01/18/25  0957   *   BUN 78*   CREATSERUM 11.17*   CA 8.3*   ALB 4.2      K 3.9      CO2 21.0   ALKPHO 76   AST 37*   ALT 30   BILT 0.4   TP 6.9         Recent Labs   Lab 01/18/25  0957 01/18/25  1405   RBC 2.62*  --    HGB 8.5* 8.0*   HCT 25.3*  --    MCV 96.6  --    MCH 32.4  --    MCHC 33.6  --    RDW 15.4  --    NEPRELIM 7.32  --    WBC 10.1  --    .0  --        No results for input(s): \"PTP\", \"INR\" in the last 168 hours.    No results for input(s): \"TROP\", \"CK\" in the last 168 hours.    Diagnostics:   EKG shows sinus rhythm with LVH and ST and T the abnormalities likely due to strain.  Normalization of these abnormalities to baseline after given nitroglycerin and reduction in blood pressure        Yasmin Ireland, APRN  1/19/2025  8:12 AM           [1]   Current Facility-Administered Medications on File Prior to Encounter   Medication Dose Route Frequency Provider Last Rate Last Admin    [COMPLETED] acetaminophen (Tylenol Extra Strength) tab 1,000 mg  1,000 mg Oral Once Mary Lou Feldman DO   1,000 mg at 01/07/25 0315    [COMPLETED] ketorolac (Toradol) 15 MG/ML injection 15 mg  15 mg Intravenous Once Mary Lou Feldman DO   15 mg at 01/07/25 0315    [COMPLETED] ketorolac (Toradol) 30 MG/ML injection 30 mg  30 mg Intravenous Once Del Cordova MD   30 mg at 01/06/25 0615    [COMPLETED] acetaminophen (Tylenol Extra Strength) tab 1,000 mg  1,000 mg Oral Once Del Cordova MD   1,000 mg at 01/06/25 0736    [COMPLETED] HYDROmorphone (Dilaudid) 1 MG/ML injection        Given at  24 0427    [COMPLETED] iopamidol 76% (ISOVUE-370) injection for power injector  100 mL Intravenous ONCE PRN Gama Ray, DO   100 mL at 24 1730    [COMPLETED] HYDROmorphone (Dilaudid) 1 MG/ML injection 0.5 mg  0.5 mg Intravenous Once Gama Ray, DO   0.5 mg at 24 1852    [COMPLETED] HYDROmorphone (Dilaudid) 1 MG/ML injection 0.5 mg  0.5 mg Intravenous Once Vicky Weiss, DO   0.5 mg at 24 2249    [COMPLETED] epoetin sylvia (Epogen, Procrit) 95432 UNIT/ML injection 10,000 Units  10,000 Units Subcutaneous Once Lenka Bond MD   10,000 Units at 24    [COMPLETED] heparin (Porcine) 1000 UNIT/ML injection 1,500 Units  1,500 Units Intracatheter Once Samaria Nava MD   1,500 Units at 24    [COMPLETED] sodium ferric gluconate (Ferrlecit) 125 mg in sodium chloride 0.9% 100mL IVPB premix  125 mg Intravenous Once Lenka Bond MD   Stopped at 24 194    [] sodium chloride 0.9 % IV bolus 100 mL  100 mL Intravenous Q30 Min PRN Lenka Bond MD        And    [] albumin human (Albumin) 25% injection 25 g  25 g Intravenous PRN Dialysis Lenka Bond MD        [] heparin (Porcine) 1000 UNIT/ML injection 1,500 Units  1.5 mL Intracatheter Once Lenka Bond MD        [COMPLETED] ondansetron (Zofran) 4 MG/2ML injection 4 mg  4 mg Intravenous Once Albert Rock MD   4 mg at 24 1144    [COMPLETED] pantoprazole (Protonix) 40 mg in sodium chloride 0.9% PF 10 mL IV push  40 mg Intravenous Once Albert Rock MD   40 mg at 24 1145    [COMPLETED] morphINE PF 2 MG/ML injection 2 mg  2 mg Intravenous Once Jhonny Rebolledo MD   2 mg at 24 0511    [] aspirin 81 MG chewable tab             [COMPLETED] aspirin chewable tab 324 mg  324 mg Oral Once Yesenia Rubin MD   324 mg at 24 1145    [COMPLETED] HYDROcodone-acetaminophen (Norco) 5-325 MG per tab 1 tablet  1 tablet Oral Once Minna Costello DO   1 tablet at 24 0847     [COMPLETED] heparin (Porcine) 1000 UNIT/ML injection 1,500 Units  1.5 mL Intracatheter Once Samaria Nava MD   1,500 Units at 24 1259    [COMPLETED] HYDROcodone-acetaminophen (Norco) 5-325 MG per tab 1 tablet  1 tablet Oral Once Jhonny Rebolledo MD   1 tablet at 24 1810    [] sodium chloride 0.9 % IV bolus 100 mL  100 mL Intravenous Q30 Min PRN Samaria Nava MD        And    [] albumin human (Albumin) 25% injection 25 g  25 g Intravenous PRN Dialysis Samaria Nava MD        [] sodium chloride 0.9 % IV bolus 100 mL  100 mL Intravenous Q30 Min PRN Samaria Nava MD        And    [] albumin human (Albumin) 25% injection 25 g  25 g Intravenous PRN Dialysis Samaria Nava MD        [COMPLETED] heparin (Porcine) 1000 UNIT/ML injection 1,500 Units  1.5 mL Intracatheter Once Samaria Nava MD   1,500 Units at 24 2100    [COMPLETED] ondansetron (Zofran) 4 MG/2ML injection 4 mg  4 mg Intravenous Once Cata Mei APRN   4 mg at 24 1304    [COMPLETED] aspirin chewable tab 324 mg  324 mg Oral Once Maria Elena Mendoza DO   324 mg at 24 1357    [COMPLETED] HYDROmorphone (Dilaudid) 1 MG/ML injection 0.5 mg  0.5 mg Intravenous Q30 Min PRN Maria Elena Mendoza DO   0.5 mg at 24 1721    [COMPLETED] pantoprazole (Protonix) 40 mg in sodium chloride 0.9% PF 10 mL IV push  40 mg Intravenous Once Maria Elena Mendoza, DO   40 mg at 24 1412    [] dextrose 5%-sodium chloride 0.45% infusion   Intravenous Continuous Maria Elena Mendoza DO        [COMPLETED] sodium chloride 0.9% infusion   Intravenous Once Maria Elena Mendoza  mL/hr at 24 1414 New Bag at 24 1414    [COMPLETED] labetalol (Trandate) 5 mg/mL injection 20 mg  20 mg Intravenous Once Maria Elena Mendoza DO   20 mg at 24 1622    [COMPLETED] hydrALAzine (Apresoline) 20 mg/mL injection 10 mg  10 mg Intravenous Once Ihsan Cassidy MD   10 mg at 24 1226    [COMPLETED] labetalol  (Trandate) 5 mg/mL injection 20 mg  20 mg Intravenous Once Ihsan Cassidy MD   20 mg at 24 1359    [COMPLETED] HYDROmorphone (Dilaudid) 1 MG/ML injection 1 mg  1 mg Intravenous Once Ihsan Cassidy MD   1 mg at 24 1359    [COMPLETED] aspirin chewable tab 324 mg  324 mg Oral Once Ihsan Cassidy MD   324 mg at 24 1404    [] heparin (Porcine) 1000 UNIT/ML injection 1,500 Units  1.5 mL Intracatheter Once Lenka Bond MD        [COMPLETED] diphenhydrAMINE (Benadryl) 50 mg/mL  injection 12.5 mg  12.5 mg Intravenous Once David Valdivia MD   12.5 mg at 24 2150    [COMPLETED] HYDROcodone-acetaminophen (Norco) 5-325 MG per tab 2 tablet  2 tablet Oral Once Gavi Charles MD   2 tablet at 10/25/24 0029    [COMPLETED] alteplase (Activase) 2 mg in sterile water for injection (PF) 2.2 mL IV push to declot line  2 mg Intravenous Once Samaria Nava MD   2 mg at 10/21/24 1611    [COMPLETED] alteplase (Activase) 2 mg in sterile water for injection (PF) 2.2 mL IV push to declot line  2 mg Intravenous Once Samaria Nava MD   2 mg at 10/21/24 1611    [COMPLETED] heparin (Porcine) 1000 UNIT/ML injection 1,500 Units  1.5 mL Intracatheter Once Samaria Nava MD   1,500 Units at 10/22/24 0020     Current Outpatient Medications on File Prior to Encounter   Medication Sig Dispense Refill    Cholecalciferol (VITAMIN D) 50 MCG (2000) Oral Cap Take 1 capsule (2,000 Units total) by mouth once a week.      HYDROcodone-acetaminophen 7.5-325 MG Oral Tab Take 1 tablet by mouth every 6 (six) hours as needed for Pain.      lidocaine 5 % External Patch Place 1 patch onto the skin daily. 30 patch 0    calcium acetate 667 MG Oral Cap Take 1 capsule (667 mg total) by mouth daily.      cloNIDine 0.1 MG Oral Tab Take 1 tablet (0.1 mg total) by mouth nightly.      RENVELA 800 MG Oral Tab Take 3 tablets (2,400 mg total) by mouth 3 (three) times daily with meals.      hydrALAZINE 25 MG Oral  Tab Take 1 tablet (25 mg total) by mouth 2 (two) times daily.      losartan 100 MG Oral Tab Take 1 tablet (100 mg total) by mouth daily. 30 tablet 0    gabapentin 100 MG Oral Cap Take 2 capsules (200 mg total) by mouth 3 (three) times daily. 180 capsule 0    VYVANSE 60 MG Oral Cap Take 1 capsule (60 mg total) by mouth every morning.      lamoTRIgine 100 MG Oral Tab Take 1 tablet (100 mg total) by mouth daily.      memantine 5 MG Oral Tab Take 1 tablet (5 mg total) by mouth 2 (two) times daily.      albuterol 108 (90 Base) MCG/ACT Inhalation Aero Soln Inhale 2 puffs into the lungs every 6 (six) hours as needed for Wheezing.      tamsulosin 0.4 MG Oral Cap Take 1 capsule (0.4 mg total) by mouth daily.      ARIPiprazole 15 MG Oral Tab Take 1 tablet (15 mg total) by mouth daily.      buPROPion  MG Oral Tablet 24 Hr Take 1 tablet (150 mg total) by mouth daily.      FLUoxetine HCl 40 MG Oral Cap Take 1 capsule (40 mg total) by mouth daily. 7 capsule 0    carvedilol 25 MG Oral Tab Take 1 tablet (25 mg total) by mouth in the morning and 1 tablet (25 mg total) in the evening. Take with meals. 60 tablet 6    Fenofibrate 134 MG Oral Cap Take 1 capsule by mouth nightly. 90 capsule 1    Fluticasone Propionate 50 MCG/ACT Nasal Suspension SPRAY ONCE INTO EACH NOSTRIL BID PRN 15.8 mL 0    [] NIFEdipine ER 30 MG Oral Tablet 24 Hr Take 1 tablet (30 mg total) by mouth daily. Hold if systolic blood pressure <130 30 tablet 0   [2]   Allergies  Allergen Reactions    Hydrochlorothiazide RASH and HIVES

## 2025-01-19 NOTE — PROGRESS NOTES
CC: follow-up hospital admission pain, weakness    SUBJECTIVE:  Interval History:      Sleeping but arousable. States he feels better overall  Having fever overnight    OBJECTIVE:  Scheduled Meds:    methocarbamol  500 mg Oral Once    carvedilol  25 mg Oral BID with meals    NIFEdipine ER  30 mg Oral Daily    losartan  100 mg Oral Daily    isosorbide mononitrate ER  30 mg Oral Daily    cloNIDine  0.1 mg Oral BID    hydrALAZINE  25 mg Oral Q8H MARTHA    sodium chloride   Intravenous Once    cefTRIAXone  2 g Intravenous Q24H    doxycycline  100 mg Oral BID    ARIPiprazole  15 mg Oral Daily    buPROPion ER  150 mg Oral Daily    calcium acetate  667 mg Oral Daily    FLUoxetine HCl  40 mg Oral Daily    gabapentin  200 mg Oral TID    lamoTRIgine  100 mg Oral Daily    memantine  5 mg Oral BID    sevelamer carbonate  2,400 mg Oral TID CC    tamsulosin  0.4 mg Oral Daily     Continuous Infusions:   PRN Meds:   labetalol    hydrALAzine    nitroglycerin    albuterol    HYDROcodone-acetaminophen    acetaminophen    PHYSICAL EXAM  Vital signs: Temp:  [98.1 °F (36.7 °C)-100.4 °F (38 °C)] 98.9 °F (37.2 °C)  Pulse:  [] 97  Resp:  [20-37] 24  BP: (144-177)/() 144/116  SpO2:  [90 %-99 %] 90 %      GENERAL - AAO  EYES- sclera anicteric,    HENT- normocephalic,   NECK - no JVD  CV- RRR  RESP - CTAB, normal resp effort  ABDOMEN- soft, NT/ND   EXT- no LE edema        Data Review:   Labs:   Recent Labs   Lab 01/18/25  0957 01/18/25  1405 01/19/25  0623   WBC 10.1  --  10.0   HGB 8.5* 8.0* 9.9*   MCV 96.6  --  95.8   .0  --  206.0       Recent Labs   Lab 01/18/25  0957 01/19/25  0623    138   K 3.9 3.6    102   CO2 21.0 24.0   BUN 78* 32*   CREATSERUM 11.17* 6.79*   CA 8.3* 8.6*   MG  --  1.8   * 105*       Recent Labs   Lab 01/18/25  0957   ALT 30   AST 37*   ALB 4.2       No results for input(s): \"PGLU\" in the last 168 hours.        ASSESSMENT/PLAN:    Patient is a 44 year old male with PMH sig for CAD,  ESRD on HD, HTN, chf, anemia, here for sob, diffuse body aches     Impression     -abnormal EKG - CARYN in V3, not meeting criteria for STEMI  -elevated troponin - chronic   -HTN  -non obstructive CAD on angiogram 2024  -sp MV repair 1994 and 2022  -chronic d CHF, volume management with HD     -anemia, acute on chronic   -ESRD on HD  -bipolar do        Plan     *ID / Pulm  -pt with URI like symptoms. Fevers overnight. Not septic.   Rapid covid /flu neg, extended panel positive for coronavirus (non covid)  -CXR with RLL consolidation. Possible superimposed bacterial pna as well. Started on abx  -check urine legionella / strep / sputum cx if able  - blood cx ngtd     *CV  -stat bedside echo overall ok  -trend trop / EKG - decreasing   -if worsening symptoms and uptrending trop / changes in EKG may need angiogram  -sp aspirin in ER, cont  -cont rest of cardiac meds     *GI  -hgb lower than recently  -trend hgb  -dropped to 8g  -recent admission for hematochezia, had neg tagged bleeding scan. Was also at [facility] - saw GI, though to have hemorrhoidal bleeding  -check iron stores   -given 1u with HD yesterday  -GI eval     *renal  -dw renal, cont HD per renal     *chronic conditions   -home meds as able    Given his increased lethargy today will check ct head ro CNS etiology  Resume vyvanse      Scds     ============    Addendum    Pt with positive blood cx 1/ 2 bottles. Appears to be staph epi. Possibly contaminant but he has a central line and risk for seeding. Will start on vanco pending final cx. Will have ID see as well    Will continue to follow while hospitalized. Please page me or the on-call hospitalist with questions or concerns.    Mathew Brown Hospitalist  [phone]  Answering Service: [phone]     Consent: Written consent obtained, risks reviewed including but not limited to crusting, scabbing, blistering, scarring, darker or lighter pigmentary change, and/or incomplete removal.

## 2025-01-19 NOTE — CONSULTS
[facility]   part of [facility]    Report of Consultation    Godwin Fonseca Patient Status:  Inpatient    1980 MRN [id number]   Location [facility] 2NE-A Attending Jhonny Rebolledo MD   Hosp Day # 1 PCP Adrian Small MD     Date of Admission:  2025  Date of Consult:  25    Reason for Consultation:  Chronic anemia  Rectal bleeding  STEMI  Coronovirus +    History of Present Illness:  Godwin Fonseca is a a(n) 44 year old male. Pt with severe multiple medication issues including ESRD on HD, MGUS, CAD, CHF COPD, bipolar, and ongoing chronic anemia and rectal bleeding from hemorrhoids and possible diverticulosis.  Has had full GI evaluation with EGD/colonoscopy and capsule endoscopy and bleeding scans all within the past few months.  Pt now with STEMI and Coronovirus +, rectal bleeding which is resolved and ongoing anemia s/p transfusion.    History:  Past Medical History:    Anxiety state    Arrhythmia    Asthma (HCC)    Attention deficit hyperactivity disorder (ADHD)    Back problem    Bipolar 1 disorder (HCC)    CKD (chronic kidney disease) stage 3, GFR 30-59 ml/min ([facility])    Dr Meeks    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HCC)    Congestive heart disease (HCC)    COPD (chronic obstructive pulmonary disease) ([facility])    Coronary atherosclerosis    Deep vein thrombosis ([facility])    at age 17 R/T cast    Depression    Diabetes (HCC)    Dialysis patient (HCC)    Diverticulosis of large intestine    Essential hypertension    3/21 echo: severe concentric LVH with normal EF and no MR or pHTN    Extrinsic asthma, unspecified    Heart attack (HCC)    - angiogram- no intervention    Heart valve disease    mitral valve repair in /    High blood pressure    High cholesterol    History of blood transfusion    History of mitral valve repair    Hyperlipidemia    Low back pain    tight and stiff after sweeping and mopping    LVH (left ventricular hypertrophy)    Migraines    Mixed hyperlipidemia      HDL 38 LDL 97 VLDL 57     Monoclonal gammopathy    IgG kappa     Muscle weakness    MVP (mitral valve prolapse)    Repair 1994 at [facility]; echoes as recently as 3/21 show mild or trivial MR and no stenosis    Neuropathy    Osteoarthritis    hip ,knees    Pneumonia due to organism    Pulmonary embolism (HCC)    Renal disorder    Stroke (HCC)    TIA (transient ischemic attack)    Initial history of left-sided weakness and slurred speech. (+) cocaine. MRI of the brain, CT angiogram of the head and neck, and 2D echo are all unremarkable.     TMJ (dislocation of temporomandibular joint)    Troponin level elevated    Trop 60 60 47 with TIA and no CP: Lexiscan negative with EF 51    Visual impairment     Past Surgical History:   Procedure Laterality Date    Av fistula revision, open Left     Cabg      Colonoscopy N/A 03/26/2023    Procedure: COLONOSCOPY;  Surgeon: Heath Vu MD;  Location:  ENDOSCOPY    Colonoscopy N/A 12/30/2023    Procedure: COLONOSCOPY with cold snare polypectomy and forcep polypectomy;  Surgeon: Ousmane Suarez MD;  Location:  ENDOSCOPY    Colonoscopy & polypectomy  2019    Egd  2019    Duodenitis. Biopsied. EUS for weight loss was negative    Heart surgery      Hernia surgery  08/17/2022    Dr Barnes    Laminectomy,>2 sgmt,lumbar  09/06/2018    L4-L5 Decomp Discectomy ROEM L4-L5    Mitralplasty w cp bypass  1994    [facility]: Repair    Repair rotator cuff,chronic Left     torn and had a ruptured bicep    Sinus surgery        Spine surgery procedure unlisted      Valve repair  1994    mitral valve     Family History   Problem Relation Age of Onset    Hypertension Father     Alcohol and Other Disorders Associated Father     Substance Abuse Father         cocaine    Dementia Father     Cancer Father         lung    Diabetes Mother     Cancer Mother         multiple myeloma    Hypertension Mother     Anxiety Maternal Aunt     Depression Maternal Aunt     Anxiety Maternal Aunt     Depression  Maternal Aunt     Bipolar Disorder Maternal Aunt     Diabetes Maternal Grandmother     Hypertension Maternal Grandmother     Cancer Maternal Grandfather         stomach cancer    Diabetes Maternal Grandfather     Hypertension Maternal Grandfather     Alcohol and Other Disorders Associated Maternal Grandfather     Hypertension Paternal Grandmother     Hypertension Paternal Grandfather     Cancer Sister         uterine and ovarian    Hypertension Sister     Cancer Maternal Uncle         lung    Cancer Paternal Aunt         throat      reports that he quit smoking about 2 years ago. His smoking use included cigarettes. He started smoking about 29 years ago. He has a 27 pack-year smoking history. He has never been exposed to tobacco smoke. He has never used smokeless tobacco. He reports that he does not drink alcohol and does not use drugs.    Allergies:  Allergies[1]    Medications:    Current Facility-Administered Medications:     methocarbamol (Robaxin) tab 500 mg, 500 mg, Oral, Once    carvedilol (Coreg) tab 25 mg, 25 mg, Oral, BID with meals    NIFEdipine ER (Procardia-XL) 24 hr tab 30 mg, 30 mg, Oral, Daily    labetalol (Trandate) 5 mg/mL injection 10 mg, 10 mg, Intravenous, Q4H PRN    hydrALAzine (Apresoline) 20 mg/mL injection 10 mg, 10 mg, Intravenous, Q4H PRN    losartan (Cozaar) tab 100 mg, 100 mg, Oral, Daily    isosorbide mononitrate ER (Imdur) 24 hr tab 30 mg, 30 mg, Oral, Daily    nitroglycerin (Nitrostat) SL tab 0.4 mg, 0.4 mg, Sublingual, Q5 Min PRN    cloNIDine (Catapres) tab 0.1 mg, 0.1 mg, Oral, BID    hydrALAZINE (Apresoline) tab 25 mg, 25 mg, Oral, Q8H MARTHA    sodium chloride 0.9% infusion, , Intravenous, Once    cefTRIAXone (Rocephin) 2 g in sodium chloride 0.9% 100 mL IVPB-ADDV, 2 g, Intravenous, Q24H    doxycycline (Vibramycin) cap 100 mg, 100 mg, Oral, BID    albuterol (Ventolin HFA) 108 (90 Base) MCG/ACT inhaler 2 puff, 2 puff, Inhalation, Q6H PRN    ARIPiprazole (Abilify) tab 15 mg, 15 mg,  Oral, Daily    buPROPion ER (Wellbutrin XL) 24 hr tab 150 mg, 150 mg, Oral, Daily    calcium acetate (Phoslo) cap 667 mg, 667 mg, Oral, Daily    FLUoxetine (PROzac) cap 40 mg, 40 mg, Oral, Daily    gabapentin (Neurontin) cap 200 mg, 200 mg, Oral, TID    lamoTRIgine (LaMICtal) tab 100 mg, 100 mg, Oral, Daily    memantine (Namenda) tab 5 mg, 5 mg, Oral, BID    sevelamer carbonate (Renvela) tab 2,400 mg, 2,400 mg, Oral, TID CC    tamsulosin (Flomax) cap 0.4 mg, 0.4 mg, Oral, Daily    HYDROcodone-acetaminophen (Norco) 5-325 MG per tab 1 tablet, 1 tablet, Oral, Q6H PRN    acetaminophen (Tylenol Extra Strength) tab 500 mg, 500 mg, Oral, Q4H PRN    Review of Systems:  GENERAL: feels well otherwise  SKIN: neg  EYES: neg  HEENT: neg  LUNGS: neg  CARDIOVASCULAR: neg  GI: as above  NEURO: neg  PSYCHE:  neg    Physical Exam:    Blood pressure (!) 118/91, pulse 96, temperature 98.9 °F (37.2 °C), temperature source Oral, resp. rate 24, height 74\", weight 242 lb 15.2 oz (110.2 kg), SpO2 90%.  GENERAL: well developed, well nourished, in no apparent distress  SKIN: no rashes, no jaundice  HEENT: atraumatic, normocephalic,  EYES:  no icterus  CHEST: no chest tenderness  LUNGS: clear to auscultation  CARDIO: RRR without murmur  GI: good BS's and no masses, HSM or tenderness  EXTREMITIES: no edema  NEURO: Oriented times three    Laboratory Data:  Lab Results   Component Value Date    WBC 10.0 01/19/2025    HGB 9.9 01/19/2025    HCT 29.6 01/19/2025    .0 01/19/2025    CREATSERUM 6.79 01/19/2025    BUN 32 01/19/2025     01/19/2025    K 3.6 01/19/2025     01/19/2025    CO2 24.0 01/19/2025     01/19/2025    CA 8.6 01/19/2025    MG 1.8 01/19/2025    TROPHS 493 01/18/2025       Imaging:      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 (HCC)    Chronic non-seasonal allergic rhinitis    Chronic sinusitis, unspecified location    ADHD  (attention deficit hyperactivity disorder), inattentive type    Bipolar depression (HCC)    Essential hypertension    Mild persistent asthma without complication (HCC)    CKD (chronic kidney disease) stage 3, GFR 30-59 ml/min (HCC)    Self-inflicted injury    Bipolar disorder in partial remission, most recent episode unspecified type (HCC)    Family history of prostate cancer    Fatigue, unspecified type    Alkaline phosphatase elevation    Hyperlipidemia, mixed    Low serum HDL    Spinal stenosis of lumbar region with neurogenic claudication    Prolapsed lumbar disc    Status post lumbar surgery    Cauda equina syndrome (HCC)    Lumbar disc prolapse with compression radiculopathy    Syncope and collapse    Hypokalemia    Orthostatic hypotension    Hypertension, unspecified type    Weight loss    Chest pain, unspecified type    History of mitral valve stenosis    Acute pericarditis, unspecified type ([facility])    Cervical radiculopathy    Elevated blood protein    IgG monoclonal protein disorder    MGUS (monoclonal gammopathy of unknown significance)    Hypertensive urgency    Bipolar 2 disorder ([facility])    Employment problem    Stress    Anxiety disorder, unspecified type    Weakness of left lower extremity    Rectal bleeding    Paresthesia of foot, bilateral    Obesity (BMI 30-39.9)    TIA (transient ischemic attack)    TMJ dysfunction    Inverted papilloma of nasal cavity    Type 2 diabetes mellitus with stage 3b chronic kidney disease, without long-term current use of insulin (HCC)    Acute kidney injury (HCC)    Metabolic acidosis    Hyperglycemia    Chronic kidney disease, unspecified CKD stage    Abdominal distension    SOB (shortness of breath)    Hypertensive crisis    Acute on chronic congestive heart failure, unspecified heart failure type (HCC)    Acute congestive heart failure (HCC)    Acute congestive heart failure, unspecified heart failure type (HCC)    Acute on chronic diastolic congestive heart failure  (HCC)    Stage 4 chronic kidney disease (HCC)    ROBERT (acute kidney injury) (HCC)    Abdominal pain, left lower quadrant    Hypertensive emergency    Acute chest pain    Acute on chronic renal insufficiency    Chronic abdominal pain    Nonintractable headache, unspecified chronicity pattern, unspecified headache type    Chronic right shoulder pain    HCAP (healthcare-associated pneumonia)    Acute intractable headache, unspecified headache type    ESRD (end stage renal disease) on dialysis (HCC)    Diarrhea, unspecified type    Primary hypertension    Dyspnea, unspecified type    Abdominal pain, acute    ESRD on dialysis (HCC)    Fluid overload    ESRD needing dialysis (HCC)    COVID-19 virus infection    Hypotension, unspecified hypotension type    Anemia    Acute renal failure (ARF) (HCC)    Neck pain    Acute nonintractable headache, unspecified headache type    GI bleed    Chronic kidney disease with end stage renal failure on dialysis (HCC)    Lower abdominal pain    ESRD (end stage renal disease) ([facility])    Resistant hypertension    Community acquired pneumonia of right lower lobe of lung    Acute renal failure with acute renal cortical necrosis superimposed on stage 4 chronic kidney disease (HCC)    Acute renal failure, unspecified acute renal failure type (HCC)    Hypervolemia, unspecified hypervolemia type    End stage renal disease on dialysis (HCC)    Acute respiratory failure with hypoxia (HCC)    Stage 5 chronic kidney disease on chronic dialysis (HCC)    Anemia, unspecified type    Hyperkalemia    Hemodialysis catheter infection, initial encounter (HCC)    Type 2 diabetes mellitus with chronic kidney disease on chronic dialysis, without long-term current use of insulin (HCC)    Coronary artery disease involving native coronary artery of native heart without angina pectoris    Pneumonia of right lower lobe due to infectious organism    Expressive aphasia    Uncontrolled hypertension    Bipolar disorder  with moderate depression (HCC)    BRBPR (bright red blood per rectum)    Weakness    Nosebleed    ESRD on hemodialysis (HCC)    Acute colitis    Chest pain of uncertain etiology    Medically noncompliant    Dialysis patient, noncompliant (HCC)    History of lower GI bleeding       This is a 43 y/o with multiple severe medical issues ESRD on HD, CAD, CHF, bipolar, chronic anemia.  Now with ongoing anemia, rectal bleeding transient and intermittent, STEMI, Coronovirus +    From an anemia standpoint, given his ESRD, MGUS and multiple severe chronic diseases, his resting hgb probably is going to be quite low in the 6-7 range, but at this level, pt can not survive.  He now has STEMI.  Thus, I believe he is going to be transfusion dependent.  He has had full GI w/u including EGD/colonoscopy, capsule endoscopy, bleed study, and only findings are hemorrhoids and diverticulosis.      At this time, no further GI plans and I do recommend weekly outpt hgb checks with transfusions as needed.    Discussed with pt and hospitalist in detail.      Joe Salinas MD  1/19/2025  10:05 AM       [1]   Allergies  Allergen Reactions    Hydrochlorothiazide RASH and HIVES

## 2025-01-19 NOTE — PLAN OF CARE
Patient admitted from ED for SOB and body aches. On droplet precautions for coronavirus. Patient is A&O x4 and cooperative throughout the shift. Patient is able to ambulate with stand by assist. Telemetry monitor reads ST and O2 is stable on RA. Patient missed dialysis appointment yesterday, so will complete HD today and get a RBC transfusion during for Hgb 8.0. Will need to collect a urine sample. Education was provided on plan of care, and patient verbalized understanding. Patient safety was maintained. Call light and belongings are in reach.     POC:  - Hgb GOAL >8.0  - Urine Sample  - IV abx  - Pain management    Problem: Patient/Family Goals  Goal: Patient/Family Long Term Goal  Description: Patient's Long Term Goal:     Interventions:  -   - See additional Care Plan goals for specific interventions  Outcome: Progressing  Goal: Patient/Family Short Term Goal  Description: Patient's Short Term Goal:     Interventions:   -   - See additional Care Plan goals for specific interventions  Outcome: Progressing     Problem: PAIN - ADULT  Goal: Verbalizes/displays adequate comfort level or patient's stated pain goal  Description: INTERVENTIONS:  - Encourage pt to monitor pain and request assistance  - Assess pain using appropriate pain scale  - Administer analgesics based on type and severity of pain and evaluate response  - Implement non-pharmacological measures as appropriate and evaluate response  - Consider cultural and social influences on pain and pain management  - Manage/alleviate anxiety  - Utilize distraction and/or relaxation techniques  - Monitor for opioid side effects  - Notify MD/LIP if interventions unsuccessful or patient reports new pain  - Anticipate increased pain with activity and pre-medicate as appropriate  Outcome: Progressing     Problem: RESPIRATORY - ADULT  Goal: Achieves optimal ventilation and oxygenation  Description: INTERVENTIONS:  - Assess for changes in respiratory status  - Assess for  changes in mentation and behavior  - Position to facilitate oxygenation and minimize respiratory effort  - Oxygen supplementation based on oxygen saturation or ABGs  - Provide Smoking Cessation handout, if applicable  - Encourage broncho-pulmonary hygiene including cough, deep breathe, Incentive Spirometry  - Assess the need for suctioning and perform as needed  - Assess and instruct to report SOB or any respiratory difficulty  - Respiratory Therapy support as indicated  - Manage/alleviate anxiety  - Monitor for signs/symptoms of CO2 retention  Outcome: Progressing

## 2025-01-19 NOTE — PROGRESS NOTES
[facility] - Nephrology  Inpatient Follow-up    Godwin Fonseca Patient Status:  Inpatient    1980 MRN [id number]   [facility] 2NE-A Attending Jhonny Rebolledo MD   Hosp Day # 1 PCP Adrian Small MD       SUBJECTIVE:     Patient seen and examined at bedside. No acute complaints today.     MEDICATIONS:     Current Facility-Administered Medications   Medication Dose Route Frequency    methocarbamol (Robaxin) tab 500 mg  500 mg Oral Once    carvedilol (Coreg) tab 25 mg  25 mg Oral BID with meals    NIFEdipine ER (Procardia-XL) 24 hr tab 30 mg  30 mg Oral Daily    labetalol (Trandate) 5 mg/mL injection 10 mg  10 mg Intravenous Q4H PRN    hydrALAzine (Apresoline) 20 mg/mL injection 10 mg  10 mg Intravenous Q4H PRN    losartan (Cozaar) tab 100 mg  100 mg Oral Daily    isosorbide mononitrate ER (Imdur) 24 hr tab 30 mg  30 mg Oral Daily    [START ON 2025] fluticasone propionate (Flonase) 50 MCG/ACT nasal suspension 1 spray  1 spray Each Nare Daily    Fenofibrate CAPS 1 capsule  1 capsule Oral Nightly    amphetamine-dextroamphetamine (Adderall) tab 10 mg  10 mg Oral BID AC    nitroglycerin (Nitrostat) SL tab 0.4 mg  0.4 mg Sublingual Q5 Min PRN    cloNIDine (Catapres) tab 0.1 mg  0.1 mg Oral BID    hydrALAZINE (Apresoline) tab 25 mg  25 mg Oral Q8H MARTHA    sodium chloride 0.9% infusion   Intravenous Once    cefTRIAXone (Rocephin) 2 g in sodium chloride 0.9% 100 mL IVPB-ADDV  2 g Intravenous Q24H    doxycycline (Vibramycin) cap 100 mg  100 mg Oral BID    albuterol (Ventolin HFA) 108 (90 Base) MCG/ACT inhaler 2 puff  2 puff Inhalation Q6H PRN    ARIPiprazole (Abilify) tab 15 mg  15 mg Oral Daily    buPROPion ER (Wellbutrin XL) 24 hr tab 150 mg  150 mg Oral Daily    calcium acetate (Phoslo) cap 667 mg  667 mg Oral Daily    FLUoxetine (PROzac) cap 40 mg  40 mg Oral Daily    gabapentin (Neurontin) cap 200 mg  200 mg Oral TID    lamoTRIgine (LaMICtal) tab 100 mg  100 mg Oral Daily    memantine  (Namenda) tab 5 mg  5 mg Oral BID    sevelamer carbonate (Renvela) tab 2,400 mg  2,400 mg Oral TID CC    tamsulosin (Flomax) cap 0.4 mg  0.4 mg Oral Daily    HYDROcodone-acetaminophen (Norco) 5-325 MG per tab 1 tablet  1 tablet Oral Q6H PRN    acetaminophen (Tylenol Extra Strength) tab 500 mg  500 mg Oral Q4H PRN       PHYSICAL EXAM:     Vital Signs: BP (!) 146/98 (BP Location: Right arm)   Pulse 98   Temp 99.8 °F (37.7 °C) (Oral)   Resp 22   Ht 6' 2\" (1.88 m)   Wt 242 lb 15.2 oz (110.2 kg)   SpO2 90%   BMI 31.19 kg/m²   Temp (24hrs), Av.3 °F (37.4 °C), Min:98.1 °F (36.7 °C), Max:100.4 °F (38 °C)       Intake/Output Summary (Last 24 hours) at 2025 1306  Last data filed at 2025 0900  Gross per 24 hour   Intake 810 ml   Output 4000 ml   Net -3190 ml     Wt Readings from Last 3 Encounters:   25 242 lb 15.2 oz (110.2 kg)   25 244 lb 11.4 oz (111 kg)   25 245 lb (111.1 kg)     General: no acute distress  HEENT: normocephalic, atraumatic  CV: RRR  Respiratory: no respiratory distress  Extremities: no edema bilaterally  Skin: warm, dry  Neuro: awake, alert      LABORATORY DATA:     Lab Results   Component Value Date     (H) 2025    BUN 32 (H) 2025    BUNCREA 13.0 2022    CREATSERUM 6.79 (H) 2025    ANIONGAP 12 2025    GFR 59 (L) 2018    GFRNAA 30 (L) 2022    GFRAA 35 (L) 2022    CA 8.6 (L) 2025    OSMOCALC 293 2025    ALKPHO 76 2025    AST 37 (H) 2025    ALT 30 2025    BILT 0.4 2025    TP 6.9 2025    ALB 4.2 2025    GLOBULIN 2.7 2025     2025    K 3.6 2025     2025    CO2 24.0 2025     Lab Results   Component Value Date    WBC 10.0 2025    RBC 3.09 (L) 2025    HGB 9.9 (L) 2025    HCT 29.6 (L) 2025    .0 2025    MPV 11.5 2012    MCV 95.8 2025    MCH 32.0 2025    MCHC 33.4 2025    RDW  15.4 01/19/2025    NEPRELIM 7.32 01/18/2025    NEPERCENT 72.8 01/18/2025    LYPERCENT 11.1 01/18/2025    MOPERCENT 10.5 01/18/2025    EOPERCENT 4.4 01/18/2025    BAPERCENT 0.8 01/18/2025    NE 7.32 01/18/2025    LYMABS 1.12 01/18/2025    MOABSO 1.06 (H) 01/18/2025    EOABSO 0.44 01/18/2025    BAABSO 0.08 01/18/2025     Lab Results   Component Value Date    MALBP 167.00 05/24/2023    CREUR 142.00 05/24/2023    CREUR 142.00 05/24/2023     Lab Results   Component Value Date    COLORUR Light-Yellow 11/17/2024    CLARITY Clear 11/17/2024    SPECGRAVITY 1.013 11/17/2024    GLUUR Normal 11/17/2024    BILUR Negative 11/17/2024    KETUR Negative 11/17/2024    BLOODURINE Negative 11/17/2024    PHURINE 8.5 (H) 11/17/2024    PROUR 100 (A) 11/17/2024    UROBILINOGEN Normal 11/17/2024    NITRITE Negative 11/17/2024    LEUUR Negative 11/17/2024    WBCUR 1-5 11/17/2024    RBCUR 0-2 11/17/2024    EPIUR Few (A) 11/17/2024    BACUR Rare (A) 11/17/2024    CAOXUR Occasional (A) 04/20/2018    HYLUR Present (A) 05/14/2019       IMAGING:     Reviewed    ASSESSMENT:      # ESRD on HD  -MWF schedule outpatient  -[facility]  -L AVF, R TDC     # HTN/Volume Status  -Home medications: carvedilol, hydralazine, losartan, nifedipine      # Anemia     # Secondary Hyperparathyroidism     PLAN:      -HD MWF, ordered for tomorrow.   -UF with HD as tolerated  -Continue home BP medications   -Defer OWEN in setting of high BPs  -Anemia management per primary  -Continue calcium acetate, sevelamer  -Avoid nephrotoxins and renally dose medications for creatinine clearance  -Monitor intake and output daily  -Daily weights     We will continue to follow.    Thank you for allowing us to participate in the care of this patient.       Samantha Muñoz, DO [facility] - Nephrology

## 2025-01-19 NOTE — PLAN OF CARE
Assumed care of patient at 1930. Aox4, lethargic. RA, Levi, reports feeling SOB at times. (+) Coronavirus-> Droplet precautions in place. ST, denies chest pain. SBA. Continent of bowel and bladder. Permacath to R-Upper-chest CDI (Dialysis MWF). Bed locked and in lowest position. Call light and personal items within reach.      -Fever 100.4f. Pt reported severe chills @2200. Tylenol given.   -1 Unit P-RBC's given at 2215 via dialysis pump.     POC:   Monitor HGB  IV/PO ABX  Dialysis MWF  DW/IO     Problem: PAIN - ADULT  Goal: Verbalizes/displays adequate comfort level or patient's stated pain goal  Description: INTERVENTIONS:  - Encourage pt to monitor pain and request assistance  - Assess pain using appropriate pain scale  - Administer analgesics based on type and severity of pain and evaluate response  - Implement non-pharmacological measures as appropriate and evaluate response  - Consider cultural and social influences on pain and pain management  - Manage/alleviate anxiety  - Utilize distraction and/or relaxation techniques  - Monitor for opioid side effects  - Notify MD/LIP if interventions unsuccessful or patient reports new pain  - Anticipate increased pain with activity and pre-medicate as appropriate  Outcome: Progressing     Problem: RESPIRATORY - ADULT  Goal: Achieves optimal ventilation and oxygenation  Description: INTERVENTIONS:  - Assess for changes in respiratory status  - Assess for changes in mentation and behavior  - Position to facilitate oxygenation and minimize respiratory effort  - Oxygen supplementation based on oxygen saturation or ABGs  - Provide Smoking Cessation handout, if applicable  - Encourage broncho-pulmonary hygiene including cough, deep breathe, Incentive Spirometry  - Assess the need for suctioning and perform as needed  - Assess and instruct to report SOB or any respiratory difficulty  - Respiratory Therapy support as indicated  - Manage/alleviate anxiety  - Monitor for  signs/symptoms of CO2 retention  Outcome: Progressing     Problem: CARDIOVASCULAR - ADULT  Goal: Maintains optimal cardiac output and hemodynamic stability  Description: INTERVENTIONS:  - Monitor vital signs, rhythm, and trends  - Monitor for bleeding, hypotension and signs of decreased cardiac output  - Evaluate effectiveness of vasoactive medications to optimize hemodynamic stability  - Monitor arterial and/or venous puncture sites for bleeding and/or hematoma  - Assess quality of pulses, skin color and temperature  - Assess for signs of decreased coronary artery perfusion - ex. Angina  - Evaluate fluid balance, assess for edema, trend weights  Reactivated  Goal: Absence of cardiac arrhythmias or at baseline  Description: INTERVENTIONS:  - Continuous cardiac monitoring, monitor vital signs, obtain 12 lead EKG if indicated  - Evaluate effectiveness of antiarrhythmic and heart rate control medications as ordered  - Initiate emergency measures for life threatening arrhythmias  - Monitor electrolytes and administer replacement therapy as ordered  Reactivated

## 2025-01-19 NOTE — PLAN OF CARE
Received patient from NOC RN at 0730. Patient A&O x4, very lethargic, MD aware. Patient on room air, bilat lung sounds diminished. Patient has decreased urine output due to being an HD patient. He is reporting no pain at this time and is resting in bed. Fall precautions in place, call light and belongings within reach. Plan of care evolving.     POC:   - CT head  - dialysis tomorrow  - monitor vitals/telemetry    Problem: PAIN - ADULT  Goal: Verbalizes/displays adequate comfort level or patient's stated pain goal  Description: INTERVENTIONS:  - Encourage pt to monitor pain and request assistance  - Assess pain using appropriate pain scale  - Administer analgesics based on type and severity of pain and evaluate response  - Implement non-pharmacological measures as appropriate and evaluate response  - Consider cultural and social influences on pain and pain management  - Manage/alleviate anxiety  - Utilize distraction and/or relaxation techniques  - Monitor for opioid side effects  - Notify MD/LIP if interventions unsuccessful or patient reports new pain  - Anticipate increased pain with activity and pre-medicate as appropriate  Outcome: Progressing     Problem: RESPIRATORY - ADULT  Goal: Achieves optimal ventilation and oxygenation  Description: INTERVENTIONS:  - Assess for changes in respiratory status  - Assess for changes in mentation and behavior  - Position to facilitate oxygenation and minimize respiratory effort  - Oxygen supplementation based on oxygen saturation or ABGs  - Provide Smoking Cessation handout, if applicable  - Encourage broncho-pulmonary hygiene including cough, deep breathe, Incentive Spirometry  - Assess the need for suctioning and perform as needed  - Assess and instruct to report SOB or any respiratory difficulty  - Respiratory Therapy support as indicated  - Manage/alleviate anxiety  - Monitor for signs/symptoms of CO2 retention  Outcome: Progressing     Problem: CARDIOVASCULAR -  ADULT  Goal: Maintains optimal cardiac output and hemodynamic stability  Description: INTERVENTIONS:  - Monitor vital signs, rhythm, and trends  - Monitor for bleeding, hypotension and signs of decreased cardiac output  - Evaluate effectiveness of vasoactive medications to optimize hemodynamic stability  - Monitor arterial and/or venous puncture sites for bleeding and/or hematoma  - Assess quality of pulses, skin color and temperature  - Assess for signs of decreased coronary artery perfusion - ex. Angina  - Evaluate fluid balance, assess for edema, trend weights  Outcome: Progressing  Goal: Absence of cardiac arrhythmias or at baseline  Description: INTERVENTIONS:  - Continuous cardiac monitoring, monitor vital signs, obtain 12 lead EKG if indicated  - Evaluate effectiveness of antiarrhythmic and heart rate control medications as ordered  - Initiate emergency measures for life threatening arrhythmias  - Monitor electrolytes and administer replacement therapy as ordered  Outcome: Progressing

## 2025-01-20 ENCOUNTER — APPOINTMENT (OUTPATIENT)
Facility: HOSPITAL | Age: 45
End: 2025-01-20
Attending: HOSPITALIST
Payer: MEDICARE

## 2025-01-20 ENCOUNTER — APPOINTMENT (OUTPATIENT)
Dept: CV DIAGNOSTICS | Facility: HOSPITAL | Age: 45
End: 2025-01-20
Attending: INTERNAL MEDICINE
Payer: MEDICARE

## 2025-01-20 LAB
ALBUMIN SERPL-MCNC: 3.9 G/DL (ref 3.2–4.8)
ANION GAP SERPL CALC-SCNC: 13 MMOL/L (ref 0–18)
ANION GAP SERPL CALC-SCNC: 13 MMOL/L (ref 0–18)
ATRIAL RATE: 107 BPM
ATRIAL RATE: 108 BPM
BLOOD TYPE BARCODE: 5100
BUN BLD-MCNC: 47 MG/DL (ref 9–23)
BUN BLD-MCNC: 47 MG/DL (ref 9–23)
CALCIUM BLD-MCNC: 7.9 MG/DL (ref 8.7–10.6)
CALCIUM BLD-MCNC: 7.9 MG/DL (ref 8.7–10.6)
CHLORIDE SERPL-SCNC: 104 MMOL/L (ref 98–112)
CHLORIDE SERPL-SCNC: 104 MMOL/L (ref 98–112)
CO2 SERPL-SCNC: 23 MMOL/L (ref 21–32)
CO2 SERPL-SCNC: 23 MMOL/L (ref 21–32)
CREAT BLD-MCNC: 8.41 MG/DL
CREAT BLD-MCNC: 8.41 MG/DL
EGFRCR SERPLBLD CKD-EPI 2021: 7 ML/MIN/1.73M2 (ref 60–?)
EGFRCR SERPLBLD CKD-EPI 2021: 7 ML/MIN/1.73M2 (ref 60–?)
ERYTHROCYTE [DISTWIDTH] IN BLOOD BY AUTOMATED COUNT: 15.3 %
GLUCOSE BLD-MCNC: 110 MG/DL (ref 70–99)
GLUCOSE BLD-MCNC: 126 MG/DL (ref 70–99)
GLUCOSE BLD-MCNC: 138 MG/DL (ref 70–99)
GLUCOSE BLD-MCNC: 138 MG/DL (ref 70–99)
GLUCOSE BLD-MCNC: 140 MG/DL (ref 70–99)
HCT VFR BLD AUTO: 28.7 %
HGB BLD-MCNC: 9.2 G/DL
MAGNESIUM SERPL-MCNC: 1.6 MG/DL (ref 1.6–2.6)
MCH RBC QN AUTO: 31.6 PG (ref 26–34)
MCHC RBC AUTO-ENTMCNC: 32.1 G/DL (ref 31–37)
MCV RBC AUTO: 98.6 FL
OSMOLALITY SERPL CALC.SUM OF ELEC: 304 MOSM/KG (ref 275–295)
OSMOLALITY SERPL CALC.SUM OF ELEC: 304 MOSM/KG (ref 275–295)
P AXIS: 37 DEGREES
P AXIS: 40 DEGREES
P-R INTERVAL: 176 MS
P-R INTERVAL: 180 MS
PHOSPHATE SERPL-MCNC: 7 MG/DL (ref 2.4–5.1)
PLATELET # BLD AUTO: 181 10(3)UL (ref 150–450)
POTASSIUM SERPL-SCNC: 3.9 MMOL/L (ref 3.5–5.1)
POTASSIUM SERPL-SCNC: 3.9 MMOL/L (ref 3.5–5.1)
Q-T INTERVAL: 364 MS
Q-T INTERVAL: 376 MS
QRS DURATION: 94 MS
QRS DURATION: 94 MS
QTC CALCULATION (BEZET): 485 MS
QTC CALCULATION (BEZET): 503 MS
R AXIS: 40 DEGREES
R AXIS: 46 DEGREES
RBC # BLD AUTO: 2.91 X10(6)UL
SODIUM SERPL-SCNC: 140 MMOL/L (ref 136–145)
SODIUM SERPL-SCNC: 140 MMOL/L (ref 136–145)
T AXIS: 73 DEGREES
T AXIS: 73 DEGREES
UNIT VOLUME: 350 ML
VENTRICULAR RATE: 107 BPM
VENTRICULAR RATE: 108 BPM
WBC # BLD AUTO: 6.5 X10(3) UL (ref 4–11)

## 2025-01-20 PROCEDURE — 94640 AIRWAY INHALATION TREATMENT: CPT

## 2025-01-20 PROCEDURE — 80069 RENAL FUNCTION PANEL: CPT | Performed by: STUDENT IN AN ORGANIZED HEALTH CARE EDUCATION/TRAINING PROGRAM

## 2025-01-20 PROCEDURE — 83735 ASSAY OF MAGNESIUM: CPT | Performed by: HOSPITALIST

## 2025-01-20 PROCEDURE — 90935 HEMODIALYSIS ONE EVALUATION: CPT | Performed by: PEDIATRICS

## 2025-01-20 PROCEDURE — 85027 COMPLETE CBC AUTOMATED: CPT | Performed by: HOSPITALIST

## 2025-01-20 PROCEDURE — 82962 GLUCOSE BLOOD TEST: CPT

## 2025-01-20 RX ORDER — IPRATROPIUM BROMIDE AND ALBUTEROL SULFATE 2.5; .5 MG/3ML; MG/3ML
3 SOLUTION RESPIRATORY (INHALATION) EVERY 4 HOURS PRN
Status: DISCONTINUED | OUTPATIENT
Start: 2025-01-20 | End: 2025-01-22

## 2025-01-20 RX ORDER — HEPARIN SODIUM 5000 [USP'U]/ML
5000 INJECTION, SOLUTION INTRAVENOUS; SUBCUTANEOUS EVERY 8 HOURS SCHEDULED
Status: DISCONTINUED | OUTPATIENT
Start: 2025-01-20 | End: 2025-01-22

## 2025-01-20 RX ORDER — DOCUSATE SODIUM 100 MG/1
100 CAPSULE, LIQUID FILLED ORAL 2 TIMES DAILY PRN
Status: DISCONTINUED | OUTPATIENT
Start: 2025-01-20 | End: 2025-01-22

## 2025-01-20 NOTE — PROGRESS NOTES
[facility]   part of [facility]    Nephrology Progress Note    Godwin Fonseca Attending:  Mathew Elizabeth DO       SUBJECTIVE:     Seen on HD  Tolerating treatment with CVC in use  States AVF still with clotting issues  No leg swelling or SOB    PHYSICAL EXAM:     Vital Signs: BP (!) 174/98 (BP Location: Right arm)   Pulse 93   Temp 98.2 °F (36.8 °C) (Oral)   Resp 18   Ht 188 cm (6' 2\")   Wt 248 lb 0.3 oz (112.5 kg)   SpO2 100%   BMI 31.84 kg/m²   Temp (24hrs), Av.2 °F (36.8 °C), Min:97.5 °F (36.4 °C), Max:98.7 °F (37.1 °C)       Intake/Output Summary (Last 24 hours) at 2025 1609  Last data filed at 2025 1544  Gross per 24 hour   Intake 1210 ml   Output 4000 ml   Net -2790 ml     Wt Readings from Last 3 Encounters:   25 248 lb 0.3 oz (112.5 kg)   25 244 lb 11.4 oz (111 kg)   25 245 lb (111.1 kg)       General: pleasant, well appearing  Skin: no visible rashes  HEENT: NCAT  Cardiac: Regular rate and rhythm, no murmur/gallop or rub  Lungs: CTAB, no wheeze, no rale, no rhonchi  Abdomen: Soft, NTND  Extremities: warm, well perfused, no leg edema  Neurologic/Psych: mentating well, no asterixis  LUE AVF + thrill and bruit  RIJ CVC       LABORATORY DATA:     Lab Results   Component Value Date     (H) 2025     (H) 2025    BUN 47 (H) 2025    BUN 47 (H) 2025    BUNCREA 13.0 2022    CREATSERUM 8.41 (H) 2025    CREATSERUM 8.41 (H) 2025    ANIONGAP 13 2025    ANIONGAP 13 2025    GFR 59 (L) 2018    GFRNAA 30 (L) 2022    GFRAA 35 (L) 2022    CA 7.9 (L) 2025    CA 7.9 (L) 2025    OSMOCALC 304 (H) 2025    OSMOCALC 304 (H) 2025    ALKPHO 76 2025    AST 37 (H) 2025    ALT 30 2025    BILT 0.4 2025    TP 6.9 2025    ALB 3.9 2025    GLOBULIN 2.7 2025     2025     2025    K 3.9 2025    K 3.9 2025    CL  104 01/20/2025     01/20/2025    CO2 23.0 01/20/2025    CO2 23.0 01/20/2025     Lab Results   Component Value Date    WBC 6.5 01/20/2025    RBC 2.91 (L) 01/20/2025    HGB 9.2 (L) 01/20/2025    HCT 28.7 (L) 01/20/2025    .0 01/20/2025    MPV 11.5 12/14/2012    MCV 98.6 01/20/2025    MCH 31.6 01/20/2025    MCHC 32.1 01/20/2025    RDW 15.3 01/20/2025    NEPRELIM 7.32 01/18/2025    NEPERCENT 72.8 01/18/2025    LYPERCENT 11.1 01/18/2025    MOPERCENT 10.5 01/18/2025    EOPERCENT 4.4 01/18/2025    BAPERCENT 0.8 01/18/2025    NE 7.32 01/18/2025    LYMABS 1.12 01/18/2025    MOABSO 1.06 (H) 01/18/2025    EOABSO 0.44 01/18/2025    BAABSO 0.08 01/18/2025     Lab Results   Component Value Date    MALBP 167.00 05/24/2023    CREUR 142.00 05/24/2023    CREUR 142.00 05/24/2023     Lab Results   Component Value Date    COLORUR Light-Yellow 11/17/2024    CLARITY Clear 11/17/2024    SPECGRAVITY 1.013 11/17/2024    GLUUR Normal 11/17/2024    BILUR Negative 11/17/2024    KETUR Negative 11/17/2024    BLOODURINE Negative 11/17/2024    PHURINE 8.5 (H) 11/17/2024    PROUR 100 (A) 11/17/2024    UROBILINOGEN Normal 11/17/2024    NITRITE Negative 11/17/2024    LEUUR Negative 11/17/2024    WBCUR 1-5 11/17/2024    RBCUR 0-2 11/17/2024    EPIUR Few (A) 11/17/2024    BACUR Rare (A) 11/17/2024    CAOXUR Occasional (A) 04/20/2018    HYLUR Present (A) 05/14/2019         IMAGING:     Reviewed.    MEDICATIONS:       Current Facility-Administered Medications   Medication Dose Route Frequency    vancomycin (Vancocin) 1,000 mg in sodium chloride 0.9% 250 mL IVPB-ADDV  1,000 mg Intravenous Once    methocarbamol (Robaxin) tab 500 mg  500 mg Oral Once    carvedilol (Coreg) tab 25 mg  25 mg Oral BID with meals    NIFEdipine ER (Procardia-XL) 24 hr tab 30 mg  30 mg Oral Daily    labetalol (Trandate) 5 mg/mL injection 10 mg  10 mg Intravenous Q4H PRN    hydrALAzine (Apresoline) 20 mg/mL injection 10 mg  10 mg Intravenous Q4H PRN    losartan (Cozaar)  tab 100 mg  100 mg Oral Daily    isosorbide mononitrate ER (Imdur) 24 hr tab 30 mg  30 mg Oral Daily    fluticasone propionate (Flonase) 50 MCG/ACT nasal suspension 1 spray  1 spray Each Nare Daily    [Held by provider] fenofibrate micronized (Lofibra) cap 134 mg  134 mg Oral Nightly    amphetamine-dextroamphetamine (Adderall) tab 10 mg  10 mg Oral BID AC    Vancomycin: PHARMACY DOSING  1 each Intravenous See Admin Instructions (RX holding)    nitroglycerin (Nitrostat) SL tab 0.4 mg  0.4 mg Sublingual Q5 Min PRN    cloNIDine (Catapres) tab 0.1 mg  0.1 mg Oral BID    hydrALAZINE (Apresoline) tab 25 mg  25 mg Oral Q8H MARTHA    sodium chloride 0.9% infusion   Intravenous Once    doxycycline (Vibramycin) cap 100 mg  100 mg Oral BID    albuterol (Ventolin HFA) 108 (90 Base) MCG/ACT inhaler 2 puff  2 puff Inhalation Q6H PRN    ARIPiprazole (Abilify) tab 15 mg  15 mg Oral Daily    buPROPion ER (Wellbutrin XL) 24 hr tab 150 mg  150 mg Oral Daily    calcium acetate (Phoslo) cap 667 mg  667 mg Oral Daily    FLUoxetine (PROzac) cap 40 mg  40 mg Oral Daily    gabapentin (Neurontin) cap 200 mg  200 mg Oral TID    lamoTRIgine (LaMICtal) tab 100 mg  100 mg Oral Daily    memantine (Namenda) tab 5 mg  5 mg Oral BID    sevelamer carbonate (Renvela) tab 2,400 mg  2,400 mg Oral TID CC    tamsulosin (Flomax) cap 0.4 mg  0.4 mg Oral Daily    HYDROcodone-acetaminophen (Norco) 5-325 MG per tab 1 tablet  1 tablet Oral Q6H PRN    acetaminophen (Tylenol Extra Strength) tab 500 mg  500 mg Oral Q4H PRN       ASSESSMENT/PLAN:     43 yo M with history of ESRD on iHD MWF with LUE AVF and RIJ tunnelled HD catheter, HTN, severe MR s/p MVR x2, HFpEF, DVT/PE, TIA, MGUS, bipolar disorder, recurrent GI bleed, diffuse body pain, missed HD treatment.    ESRD:  -- HD today, then MWF per schedule    Anemia:  -- epo 42352 today with HD    MBD:  -- increase sevelamer to 2400 TID AC  -- ca acetate 667 tid ac    HTN:  -- pt takes coreg and losartan pre-HD  --  takes remaining hypertensives pre-HD if systolic > 180 but should take clonidine consistently to avoid rebound    LUE AVF:  -- L arm precautions  -- outpatient vascular surgery follow up given malfunction    Staph epi bacteremia:  -- antibiotics per ID  -- ok to maintain CVC per notes      Thank you for allowing me to participate in the care of this patient. Please do not hesitate to call with questions or concerns.        Lenka Bond MD  [facility]  [address] [place] [id number]    1/20/2025  4:09 PM

## 2025-01-20 NOTE — CONSULTS
[facility] Pharmacy Dosing Service      Initial Pharmacokinetic Consult for Vancomycin Dosing     Godwin Fonseca is a 44 year old male who is being initiated on vancomycin therapy for bacteremia.  Pharmacy has been asked to dose vancomycin by Dr. Elizabeth.  .  The initial treatment and monitoring approach will be non-AUC strategy.        Weight and Temperature:    Wt Readings from Last 1 Encounters:   25 110.2 kg (242 lb 15.2 oz)        Temp Readings from Last 1 Encounters:   25 98.7 °F (37.1 °C) (Oral)      Labs:   Recent Labs   Lab 25  0957 25  0623   CREATSERUM 11.17* 6.79*      Estimated Creatinine Clearance: 15.8 mL/min (A) (based on SCr of 6.79 mg/dL (H)).     Recent Labs   Lab 25  0957 25  0623   WBC 10.1 10.0          The Pharmacokinetic Target is:    Trough/random 15-20 mg/L (applies to IV dosing in HD and IP dosing in APD)    Renal Dosing Considerations:    HD- M,W, F     Assessment/Plan:   Initial/Loading dose: Will receive 2250 mg IV (25 mg/kg, capped at 2250 mg) x 1 loading dose.      Maintenance dose: Pharmacy will dose vancomycin at 1000 mg IV after each dialysis    Monitorin) Plan for vancomycin trough to be obtained prior to the 3rd HD session    2) Pharmacy will order SCr as clinically indicated to assess renal function.    3) Pharmacy will monitor for toxicity and efficacy, adjust vancomycin dose and/or frequency, and order vancomycin levels as appropriate per the Pharmacy and Therapeutics Committee approved protocol until discontinuation of the medication.       We appreciate the opportunity to assist in the care of this patient.     Mairssa Ramos, PharmD  2025  7:35 PM  [facility]: [phone]

## 2025-01-20 NOTE — PLAN OF CARE
Pt. Alert and oriented this AM. Up in bathroom brushing teeth.  Pt. Is NSR on tele. On RA denies chest pain or shortness of breath. Pt. Is continent of bowel. Not making much if any urine.   Pt. In HD now  Pt. Up with standby assist.   BP's meds held before dialysis this AM as patient says he normally holds them all before as he can drop low.   Upon further discussion patient stated he takes the carvedilol and losartan before dialysis. BP's during dialysis -180. Nephrology notified. Okay to continue to hold BP meds. Can give carvedilol is SBP is above 180 per neprhology   Parameters placed on BP meds  Problem: Patient/Family Goals  Goal: Patient/Family Long Term Goal  Description: Patient's Long Term Goal: stay out of hospital     Interventions:  - social work  - education   - outpatient fiollow upt  - See additional Care Plan goals for specific interventions  Outcome: Progressing  Goal: Patient/Family Short Term Goal  Description: Patient's Short Term Goal: go home     Interventions:   - HD  - See additional Care Plan goals for specific interventions  Outcome: Progressing     Problem: PAIN - ADULT  Goal: Verbalizes/displays adequate comfort level or patient's stated pain goal  Description: INTERVENTIONS:  - Encourage pt to monitor pain and request assistance  - Assess pain using appropriate pain scale  - Administer analgesics based on type and severity of pain and evaluate response  - Implement non-pharmacological measures as appropriate and evaluate response  - Consider cultural and social influences on pain and pain management  - Manage/alleviate anxiety  - Utilize distraction and/or relaxation techniques  - Monitor for opioid side effects  - Notify MD/LIP if interventions unsuccessful or patient reports new pain  - Anticipate increased pain with activity and pre-medicate as appropriate  Outcome: Progressing     Problem: RESPIRATORY - ADULT  Goal: Achieves optimal ventilation and oxygenation  Description:  INTERVENTIONS:  - Assess for changes in respiratory status  - Assess for changes in mentation and behavior  - Position to facilitate oxygenation and minimize respiratory effort  - Oxygen supplementation based on oxygen saturation or ABGs  - Provide Smoking Cessation handout, if applicable  - Encourage broncho-pulmonary hygiene including cough, deep breathe, Incentive Spirometry  - Assess the need for suctioning and perform as needed  - Assess and instruct to report SOB or any respiratory difficulty  - Respiratory Therapy support as indicated  - Manage/alleviate anxiety  - Monitor for signs/symptoms of CO2 retention  Outcome: Progressing     Problem: CARDIOVASCULAR - ADULT  Goal: Maintains optimal cardiac output and hemodynamic stability  Description: INTERVENTIONS:  - Monitor vital signs, rhythm, and trends  - Monitor for bleeding, hypotension and signs of decreased cardiac output  - Evaluate effectiveness of vasoactive medications to optimize hemodynamic stability  - Monitor arterial and/or venous puncture sites for bleeding and/or hematoma  - Assess quality of pulses, skin color and temperature  - Assess for signs of decreased coronary artery perfusion - ex. Angina  - Evaluate fluid balance, assess for edema, trend weights  Outcome: Progressing  Goal: Absence of cardiac arrhythmias or at baseline  Description: INTERVENTIONS:  - Continuous cardiac monitoring, monitor vital signs, obtain 12 lead EKG if indicated  - Evaluate effectiveness of antiarrhythmic and heart rate control medications as ordered  - Initiate emergency measures for life threatening arrhythmias  - Monitor electrolytes and administer replacement therapy as ordered  Outcome: Progressing

## 2025-01-20 NOTE — CM/SW NOTE
SW noted and acknowledged order for HH services.    Per chart review, pt appears to be active with [facility].    SW sent referral to [facility] via [facility]. Bernard Roper at [facility] confirmed pt is current with them. SW sent MODESTA orders to [facility] via [facility].     to remain available for support and/or discharge planning.    JOSH Walters  Discharge Planner  [phone]

## 2025-01-20 NOTE — DISCHARGE INSTRUCTIONS
[facility]  [address], [address]  [place] [address]  Phone: [phone]  Fax: [phone]      Hemodialysis M-W-F  as previously scheduled  Antibx will be given with dialysis

## 2025-01-20 NOTE — PROGRESS NOTES
CC: follow-up hospital admission pain, weakness    SUBJECTIVE:  Interval History:      Looks better today, feels better too  Sitting in chair   Still coughing    OBJECTIVE:  Scheduled Meds:    vancomycin  1,000 mg Intravenous Once    methocarbamol  500 mg Oral Once    carvedilol  25 mg Oral BID with meals    NIFEdipine ER  30 mg Oral Daily    losartan  100 mg Oral Daily    isosorbide mononitrate ER  30 mg Oral Daily    fluticasone propionate  1 spray Each Nare Daily    [Held by provider] fenofibrate micronized  134 mg Oral Nightly    amphetamine-dextroamphetamine  10 mg Oral BID AC    Vancomycin IV  1 each Intravenous See Admin Instructions (RX holding)    cloNIDine  0.1 mg Oral BID    hydrALAZINE  25 mg Oral Q8H MARTHA    sodium chloride   Intravenous Once    cefTRIAXone  2 g Intravenous Q24H    doxycycline  100 mg Oral BID    ARIPiprazole  15 mg Oral Daily    buPROPion ER  150 mg Oral Daily    calcium acetate  667 mg Oral Daily    FLUoxetine HCl  40 mg Oral Daily    gabapentin  200 mg Oral TID    lamoTRIgine  100 mg Oral Daily    memantine  5 mg Oral BID    sevelamer carbonate  2,400 mg Oral TID CC    tamsulosin  0.4 mg Oral Daily     Continuous Infusions:   PRN Meds:   labetalol    hydrALAzine    nitroglycerin    albuterol    HYDROcodone-acetaminophen    acetaminophen    PHYSICAL EXAM  Vital signs: Temp:  [97.5 °F (36.4 °C)-99.8 °F (37.7 °C)] 97.9 °F (36.6 °C)  Pulse:  [] 89  Resp:  [18-24] 20  BP: (108-146)/(64-98) 118/78  SpO2:  [90 %-97 %] 94 %      GENERAL - AAO  EYES- sclera anicteric,    HENT- normocephalic,   NECK - no JVD  CV- RRR  RESP - rales in RLL, normal resp effort  ABDOMEN- soft, NT/ND   EXT- no LE edema        Data Review:   Labs:   Recent Labs   Lab 01/18/25  0957 01/18/25  1405 01/19/25  0623 01/20/25  0641   WBC 10.1  --  10.0 6.5   HGB 8.5* 8.0* 9.9* 9.2*   MCV 96.6  --  95.8 98.6   .0  --  206.0 181.0       Recent Labs   Lab 01/18/25  0957 01/19/25  0623 01/20/25  0641    138  140  140   K 3.9 3.6 3.9  3.9    102 104  104   CO2 21.0 24.0 23.0  23.0   BUN 78* 32* 47*  47*   CREATSERUM 11.17* 6.79* 8.41*  8.41*   CA 8.3* 8.6* 7.9*  7.9*   MG  --  1.8 1.6   PHOS  --   --  7.0*   * 105* 138*  138*       Recent Labs   Lab 01/18/25  0957 01/20/25  0641   ALT 30  --    AST 37*  --    ALB 4.2 3.9       Recent Labs   Lab 01/19/25  1205 01/19/25  1824 01/19/25  2108 01/20/25  0458   PGLU 93 124* 133* 126*           ASSESSMENT/PLAN:    Patient is a 44 year old male with PMH sig for CAD, ESRD on HD, HTN, chf, anemia, here for sob, diffuse body aches     Impression     -abnormal EKG - CARYN in V3, not meeting criteria for STEMI  -elevated troponin - chronic   -HTN  -non obstructive CAD on angiogram 2024  -sp MV repair 1994 and 2022  -chronic d CHF, volume management with HD     -anemia, acute on chronic   -ESRD on HD  -bipolar do        Plan     *ID / Pulm  -pt with URI like symptoms. Fevers overnight the first day. Not septic. No further fevers.   Rapid covid /flu neg, extended panel positive for coronavirus (non covid)  -CXR with RLL consolidation. Possible superimposed bacterial pna as well. Started on abx - rocephin / doxy  -check urine legionella / strep / sputum cx if able  - blood cx with 1 of 2 with staph epi - vanco added. Has central line. Repeat blood cx pending. Dw ID will see     *CV  -stat bedside echo overall ok  -trend trop / EKG - decreasing   -if worsening symptoms and uptrending trop / changes in EKG may need angiogram  -sp aspirin in ER, cont  -cont rest of cardiac meds     *GI  -hgb lower than recently  -trend hgb  -dropped to 8g  -recent admission for hematochezia, had neg tagged bleeding scan. Was also at [facility] - saw GI, though to have hemorrhoidal bleeding  -check iron stores  - iron deficient   -given 1u with HD on admission, hgb stable  -GI saw     *renal  -dw renal, cont HD per renal     *chronic conditions   -home meds as able           Scds       Will continue to follow while hospitalized. Please page me or the on-call hospitalist with questions or concerns.    Mathew Brown Hospitalist  [phone]  Answering Service: [phone]

## 2025-01-20 NOTE — PLAN OF CARE
Pt A&Ox4 drowsy, O2>90% on room air, norco given for pain, given multiple snacks throughout the night, rested well.    Problem: Patient/Family Goals  Goal: Patient/Family Long Term Goal  Description: Patient's Long Term Goal: no more chest pain    Interventions:  - administer RX as prescribed    - See additional Care Plan goals for specific interventions  Outcome: Progressing  Goal: Patient/Family Short Term Goal  Description: Patient's Short Term Goal: feel better    Interventions:   - administer RX as prescribed  - See additional Care Plan goals for specific interventions  Outcome: Progressing     Problem: PAIN - ADULT  Goal: Verbalizes/displays adequate comfort level or patient's stated pain goal  Description: INTERVENTIONS:  - Encourage pt to monitor pain and request assistance  - Assess pain using appropriate pain scale  - Administer analgesics based on type and severity of pain and evaluate response  - Implement non-pharmacological measures as appropriate and evaluate response  - Consider cultural and social influences on pain and pain management  - Manage/alleviate anxiety  - Utilize distraction and/or relaxation techniques  - Monitor for opioid side effects  - Notify MD/LIP if interventions unsuccessful or patient reports new pain  - Anticipate increased pain with activity and pre-medicate as appropriate  Outcome: Progressing     Problem: RESPIRATORY - ADULT  Goal: Achieves optimal ventilation and oxygenation  Description: INTERVENTIONS:  - Assess for changes in respiratory status  - Assess for changes in mentation and behavior  - Position to facilitate oxygenation and minimize respiratory effort  - Oxygen supplementation based on oxygen saturation or ABGs  - Provide Smoking Cessation handout, if applicable  - Encourage broncho-pulmonary hygiene including cough, deep breathe, Incentive Spirometry  - Assess the need for suctioning and perform as needed  - Assess and instruct to report SOB or any respiratory  difficulty  - Respiratory Therapy support as indicated  - Manage/alleviate anxiety  - Monitor for signs/symptoms of CO2 retention  Outcome: Progressing     Problem: CARDIOVASCULAR - ADULT  Goal: Maintains optimal cardiac output and hemodynamic stability  Description: INTERVENTIONS:  - Monitor vital signs, rhythm, and trends  - Monitor for bleeding, hypotension and signs of decreased cardiac output  - Evaluate effectiveness of vasoactive medications to optimize hemodynamic stability  - Monitor arterial and/or venous puncture sites for bleeding and/or hematoma  - Assess quality of pulses, skin color and temperature  - Assess for signs of decreased coronary artery perfusion - ex. Angina  - Evaluate fluid balance, assess for edema, trend weights  Outcome: Progressing  Goal: Absence of cardiac arrhythmias or at baseline  Description: INTERVENTIONS:  - Continuous cardiac monitoring, monitor vital signs, obtain 12 lead EKG if indicated  - Evaluate effectiveness of antiarrhythmic and heart rate control medications as ordered  - Initiate emergency measures for life threatening arrhythmias  - Monitor electrolytes and administer replacement therapy as ordered  Outcome: Progressing

## 2025-01-20 NOTE — CONSULTS
Infectious Disease Initial Consultation      Date of admission: 1/18/2025  9:46 AM     Date of service: 01/20/25 7:23 AM    Consult requested by: Mathew Elizabeth DO    Reason for consult: Staph epi bacteremia    Chief complaint: Fever    History of present illness: Godwin Fonseca is a 44 year old male with history of CAD, incisional disease on hemodialysis through dialysis line, hypertension, congestive heart failure, who presents here with worsening shortness of breath along with diffuse bodyaches that started 1 day prior to admission.  He did report some sinus drainage along with cough on and off fevers.  No other constitutional symptoms.    In the emergency room, patient was afebrile with a Tmax of 30 °C.  Otherwise hemodynamically stable.  Labs revealed anemia with a otherwise unremarkable CBC.  BMP showed a creatinine of 11.17 consistent with his end-stage renal disease.  LFTs were unremarkable.  proBNP was elevated at 40,000.  2 sets of blood cultures obtained, with 1 growing staph epi.  Repeat blood cultures were obtained on 1/19 and currently pending.  COVID-19 PCR, influenza A, influenza B and RSV by PCR were all negative.  Chest x-ray done at time of admission showed right lower lobe consolidation with small effusion.  CT brain did not show any acute abnormalities.  The patient was started initially on IV ceftriaxone and doxycycline for concerns of pneumonia, vancomycin was added on 1/19 after results of his blood cultures.    Review of systems:  All other components of the review of systems are negative, except those described in the history of present illness.     Past Medical History:    Anxiety state    Arrhythmia    Asthma ([facility])    Attention deficit hyperactivity disorder (ADHD)    Back problem    Bipolar 1 disorder ([facility])    CKD (chronic kidney disease) stage 3, GFR 30-59 ml/min ([facility])    Dr Meeks    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[facility])    Congestive heart disease ([facility])    COPD (chronic obstructive  pulmonary disease) (HCC)    Coronary atherosclerosis    Deep vein thrombosis (HCC)    at age 17 R/T cast    Depression    Diabetes (HCC)    Dialysis patient (HCC)    Diverticulosis of large intestine    Essential hypertension    3/21 echo: severe concentric LVH with normal EF and no MR or pHTN    Extrinsic asthma, unspecified    Heart attack (HCC)    2016- angiogram- no intervention    Heart valve disease    mitral valve repair in 1994/    High blood pressure    High cholesterol    History of blood transfusion    History of mitral valve repair    Hyperlipidemia    Low back pain    tight and stiff after sweeping and mopping    LVH (left ventricular hypertrophy)    Migraines    Mixed hyperlipidemia     HDL 38 LDL 97 VLDL 57     Monoclonal gammopathy    IgG kappa     Muscle weakness    MVP (mitral valve prolapse)    Repair 1994 at [facility]; echoes as recently as 3/21 show mild or trivial MR and no stenosis    Neuropathy    Osteoarthritis    hip ,knees    Pneumonia due to organism    Pulmonary embolism (HCC)    Renal disorder    Stroke (HCC)    TIA (transient ischemic attack)    Initial history of left-sided weakness and slurred speech. (+) cocaine. MRI of the brain, CT angiogram of the head and neck, and 2D echo are all unremarkable.     TMJ (dislocation of temporomandibular joint)    Troponin level elevated    Trop 60 60 47 with TIA and no CP: Lexiscan negative with EF 51    Visual impairment     Past Surgical History:   Procedure Laterality Date    Av fistula revision, open Left     Cabg      Colonoscopy N/A 03/26/2023    Procedure: COLONOSCOPY;  Surgeon: Heath Vu MD;  Location:  ENDOSCOPY    Colonoscopy N/A 12/30/2023    Procedure: COLONOSCOPY with cold snare polypectomy and forcep polypectomy;  Surgeon: Ousmane Suarez MD;  Location:  ENDOSCOPY    Colonoscopy & polypectomy  2019    Egd  2019    Duodenitis. Biopsied. EUS for weight loss was negative    Heart surgery      Hernia surgery   2022    Dr Barnes    Laminectomy,>2 sgmt,lumbar  2018    L4-L5 Decomp Discectomy ROEM L4-L5    Mitralplasty w cp bypass      [place]: Repair    Repair rotator cuff,chronic Left     torn and had a ruptured bicep    Sinus surgery        Spine surgery procedure unlisted      Valve repair      mitral valve     Social History     Socioeconomic History    Marital status:    Tobacco Use    Smoking status: Former     Current packs/day: 0.00     Average packs/day: 1 pack/day for 27.0 years (27.0 ttl pk-yrs)     Types: Cigarettes     Start date: 1995     Quit date: 2022     Years since quittin.8     Passive exposure: Never    Smokeless tobacco: Never   Vaping Use    Vaping status: Never Used   Substance and Sexual Activity    Alcohol use: No    Drug use: No     Social Drivers of Health     Financial Resource Strain: Medium Risk (2024)    Received from [facility]    Overall Financial Resource Strain (CARDIA)     Difficulty of Paying Living Expenses: Somewhat hard   Food Insecurity: No Food Insecurity (2025)    Food Insecurity     Food Insecurity: Never true   Transportation Needs: No Transportation Needs (2025)    Transportation Needs     Lack of Transportation: No   Physical Activity: Inactive (2024)    Received from [facility]    Exercise Vital Sign     Days of Exercise per Week: 0 days     Minutes of Exercise per Session: 0 min   Stress: Stress Concern Present (2024)    Received from [facility]    [employer] [place] [employer] - Occupational Stress Questionnaire     Feeling of Stress : Rather much   Social Connections: Unknown (2024)    Received from [facility]    Social Connection and Isolation Panel [NHANES]     Frequency of Communication with Friends and Family: More than three times a week     Frequency of Social Gatherings with Friends and Family: More than three times a week     Attends [employer]  Services: Never     Active Member of Clubs or Organizations: No     Attends Club or Organization Meetings: Never     Marital Status: Patient unable to answer   Housing Stability: Low Risk  (1/18/2025)    Housing Stability     Housing Instability: No     Family History   Problem Relation Age of Onset    Hypertension Father     Alcohol and Other Disorders Associated Father     Substance Abuse Father         cocaine    Dementia Father     Cancer Father         lung    Diabetes Mother     Cancer Mother         multiple myeloma    Hypertension Mother     Anxiety Maternal Aunt     Depression Maternal Aunt     Anxiety Maternal Aunt     Depression Maternal Aunt     Bipolar Disorder Maternal Aunt     Diabetes Maternal Grandmother     Hypertension Maternal Grandmother     Cancer Maternal Grandfather         stomach cancer    Diabetes Maternal Grandfather     Hypertension Maternal Grandfather     Alcohol and Other Disorders Associated Maternal Grandfather     Hypertension Paternal Grandmother     Hypertension Paternal Grandfather     Cancer Sister         uterine and ovarian    Hypertension Sister     Cancer Maternal Uncle         lung    Cancer Paternal Aunt         throat     Reviewed, see above    Medications:    methocarbamol    carvedilol    NIFEdipine ER    labetalol    hydrALAzine    losartan    isosorbide mononitrate ER    fluticasone propionate    [Held by provider] fenofibrate micronized    amphetamine-dextroamphetamine    Vancomycin IV    nitroglycerin    cloNIDine    hydrALAZINE    sodium chloride    cefTRIAXone    doxycycline    albuterol    ARIPiprazole    buPROPion ER    calcium acetate    FLUoxetine HCl    gabapentin    lamoTRIgine    memantine    sevelamer carbonate    tamsulosin    HYDROcodone-acetaminophen    acetaminophen     Allergies:  Allergies[1]    Physical Exam:  Vitals:    01/20/25 0500   BP: (!) 139/97   Pulse: 92   Resp: 18   Temp: 97.5 °F (36.4 °C)     Vitals signs and nursing note reviewed.    Constitutional:       Appearance: Normal appearance.   HENT:      Head: Normocephalic and atraumatic.      Mouth/Throat: Normal dentition     Mouth: Mucous membranes are moist.   Neck:      Musculoskeletal: Neck supple.   Cardiovascular:      Rate and Rhythm: Normal rate.      Heart sounds: Normal heart sounds. No murmur. No friction rub. No gallop.    Pulmonary:      Effort: Pulmonary effort is normal. No respiratory distress.      Breath sounds: Normal breath sounds. No stridor. No wheezing, rhonchi or rales.   Chest:      Chest wall: No tenderness.  Dialysis line noted on the right side without any signs of infection  Abdominal:      General: Abdomen is flat. There is no distension.      Palpations: Abdomen is soft. There is no mass.      Tenderness: There is no tenderness. There is no guarding or rebound.      Hernia: No hernia is present.   Musculoskeletal:      Right lower leg: No edema.      Left lower leg: No edema.   Skin:     General: Skin is warm and dry.   Neurological:      General: No focal deficit present.      Mental Status: Alert and oriented to person, place, and time.     Laboratory data:  I have independently reviewed all lab results; including old microbiological results.  Lab Results   Component Value Date    WBC 6.5 01/20/2025    HGB 9.2 01/20/2025    HCT 28.7 01/20/2025    .0 01/20/2025    CREATSERUM 8.41 01/20/2025    CREATSERUM 8.41 01/20/2025    BUN 47 01/20/2025    BUN 47 01/20/2025     01/20/2025     01/20/2025    K 3.9 01/20/2025    K 3.9 01/20/2025     01/20/2025     01/20/2025    CO2 23.0 01/20/2025    CO2 23.0 01/20/2025     01/20/2025     01/20/2025    CA 7.9 01/20/2025    CA 7.9 01/20/2025    ALB 3.9 01/20/2025    MG 1.6 01/20/2025    PHOS 7.0 01/20/2025        Recent Labs   Lab 01/18/25  0957 01/18/25  1405 01/20/25  0641   RBC 2.62*   < > 2.91*   HGB 8.5*   < > 9.2*   HCT 25.3*   < > 28.7*   MCV 96.6   < > 98.6   MCH 32.4   < > 31.6    MCHC 33.6   < > 32.1   RDW 15.4   < > 15.3   NEPRELIM 7.32  --   --    WBC 10.1   < > 6.5   .0   < > 181.0    < > = values in this interval not displayed.       Microbiology data:  Hospital Encounter on 01/18/25   1. Blood Culture     Status: Abnormal (Preliminary result)    Collection Time: 01/18/25  2:13 PM    Specimen: Blood,peripheral   Result Value Ref Range    Blood Culture Result Positive N/A    Blood Culture Result Staphylococcus epidermidis (A) N/A    Blood Smear Positive Blood Culture (A) N/A    Blood Smear Gram positive cocci in pairs and clusters (A) N/A         Radiology:  I have reviewed all imaging data available independently.   Chest x-ray on 1/18/2025:  Right lower lobe pneumonia/small effusion    Impression:  Godwin Fonseca is a 44 year old male with     Staph epi sepsis/bacteremia with threat to life  This is in the setting of a dialysis line  Given the presence of the dialysis line, will need to treat this as a real bacteremia.  However, we can salvage the line  Currently on IV vancomycin  Repeat cultures are currently pending  Right lower lobe pneumonia in the setting of a corona OC 43 infection  Currently on ceftriaxone and doxycycline  End-stage renal disease  On hemodialysis through dialysis line  Renal medicine following    Recommendations:     Discontinue ceftriaxone as his IV vancomycin would cover community-acquired organisms causing pneumonia  Continue IV vancomycin for now  Continue doxycycline for atypical coverage  Check TTE  Continue to follow-up on clearance cultures  Continue to monitor daily labs for antibiotic toxicity  Further recommendations will depend on the above work-up and clinical progress     The plan of care was discussed with the primary hospital team, Mathew Elizabeth DO     Recommendations were also discussed with the patient; all questions were answered.     Thank you for this consultation. Please don't hesitate to call the ID team for questions or any  acute changes in patient's clinical condition.    Please note that this report has been produced using speech recognition software and may contain errors related to that system including, but not limited to, errors in grammar, punctuation, and spelling, as well as words and phrases that possibly may have been recognized inappropriately.  If there are any questions or concerns, contact the dictating provider for clarification.    The  [employer] Cures Act makes medical notes like these available to patients in the interest of transparency. Please be advised this is a medical document. Medical documents are intended to carry relevant information, facts as evident, and the clinical opinion of the practitioner. The medical note is intended as peer to peer communication and may appear blunt or direct. It is written in medical language and may contain abbreviations or verbiage that are unfamiliar.     Selam Jesus MD  [facility]. Tel: [phone]. Fax: [phone].     Godwin Raymundo : 1980 MRN: [id number] CSN: [id number]          [1]   Allergies  Allergen Reactions    Hydrochlorothiazide RASH and HIVES

## 2025-01-20 NOTE — PROGRESS NOTES
Cardiology Progress Note    Godwin Fonseca Patient Status:  Inpatient    1980 MRN [id number]   Location [facility] 2NE-A Attending Mathew Elizabeth, DO   Hosp Day # 2 PCP Adrian Small MD     Assessment:     Diffuse pain throughout his body including head, ear, neck, shoulder, chest and abdomen on admission  EKG with mild ST elevation in V3 not meeting criteria for STEMI without reciprocal changes- improved with BP management   Chronic troponin elevation.  Downtrended to 493  As high as 2000 in the past  ESRD on dialysis.  Noncompliance  Hypertension: Noncompliance with antihypertensives  Tachycardida-resolved  Bipolar  Nonobstructive minimal CAD on angiogram done in   Status post mitral valve repair in  with redo robotic assisted mitral valve repair in         Plan:  EKG not meeting strict criteria for STEMI on admission and patient has history of ST changes due to LVH.  Troponin was more elevated than previous admission but he has missed dialysis.  It is lower than his peak of 2000.  Repeat EKG once blood pressure came down showed normalization without significant ST changes from baseline.  Echo stable. Given normalization of EKG with blood pressure and normal LVEF we will hold off on cardiac catheterization for now  Monitor anemia which could be exacerbating chest pain in the setting of hypertension  Continue current BP regimen. Well controlled   I again explained the importance of needing to commit to dialysis and not miss sessions.  He is a high risk for readmission patient with multiple admissions due to noncompliance with therapy.    Subjective:  No acute issues overnight     Objective:  /78 (BP Location: Right arm)   Pulse 97   Temp 97.9 °F (36.6 °C) (Oral)   Resp 20   Ht 6' 2\" (1.88 m)   Wt 248 lb 0.3 oz (112.5 kg)   SpO2 94%   BMI 31.84 kg/m²   Temp (24hrs), Av.4 °F (36.9 °C), Min:97.5 °F (36.4 °C), Max:99.8 °F (37.7 °C)      Intake/Output Summary (Last 24 hours)  at 1/20/2025 1034  Last data filed at 1/20/2025 0916  Gross per 24 hour   Intake 1410 ml   Output 0 ml   Net 1410 ml     Wt Readings from Last 3 Encounters:   01/20/25 248 lb 0.3 oz (112.5 kg)   01/07/25 244 lb 11.4 oz (111 kg)   01/06/25 245 lb (111.1 kg)       General: Awake and alert; in no acute distress  Cardiac: Regular rate and regular rhythm; no murmurs/rubs/gallops are appreciated  Lungs: Clear to auscultation bilaterally; no accessory muscle use  Abdomen: Soft, non-tender; bowel sounds are normoactive  Extremities: No clubbing/cyanosis; moves all 4 extremities normally    Current Facility-Administered Medications   Medication Dose Route Frequency    vancomycin (Vancocin) 1,000 mg in sodium chloride 0.9% 250 mL IVPB-ADDV  1,000 mg Intravenous Once    epoetin sylvia (Epogen, Procrit) 63420 UNIT/ML injection 10,000 Units  10,000 Units Intravenous Once    methocarbamol (Robaxin) tab 500 mg  500 mg Oral Once    carvedilol (Coreg) tab 25 mg  25 mg Oral BID with meals    NIFEdipine ER (Procardia-XL) 24 hr tab 30 mg  30 mg Oral Daily    labetalol (Trandate) 5 mg/mL injection 10 mg  10 mg Intravenous Q4H PRN    hydrALAzine (Apresoline) 20 mg/mL injection 10 mg  10 mg Intravenous Q4H PRN    losartan (Cozaar) tab 100 mg  100 mg Oral Daily    isosorbide mononitrate ER (Imdur) 24 hr tab 30 mg  30 mg Oral Daily    fluticasone propionate (Flonase) 50 MCG/ACT nasal suspension 1 spray  1 spray Each Nare Daily    [Held by provider] fenofibrate micronized (Lofibra) cap 134 mg  134 mg Oral Nightly    amphetamine-dextroamphetamine (Adderall) tab 10 mg  10 mg Oral BID AC    Vancomycin: PHARMACY DOSING  1 each Intravenous See Admin Instructions (RX holding)    nitroglycerin (Nitrostat) SL tab 0.4 mg  0.4 mg Sublingual Q5 Min PRN    cloNIDine (Catapres) tab 0.1 mg  0.1 mg Oral BID    hydrALAZINE (Apresoline) tab 25 mg  25 mg Oral Q8H MARTHA    sodium chloride 0.9% infusion   Intravenous Once    cefTRIAXone (Rocephin) 2 g in sodium  chloride 0.9% 100 mL IVPB-ADDV  2 g Intravenous Q24H    doxycycline (Vibramycin) cap 100 mg  100 mg Oral BID    albuterol (Ventolin HFA) 108 (90 Base) MCG/ACT inhaler 2 puff  2 puff Inhalation Q6H PRN    ARIPiprazole (Abilify) tab 15 mg  15 mg Oral Daily    buPROPion ER (Wellbutrin XL) 24 hr tab 150 mg  150 mg Oral Daily    calcium acetate (Phoslo) cap 667 mg  667 mg Oral Daily    FLUoxetine (PROzac) cap 40 mg  40 mg Oral Daily    gabapentin (Neurontin) cap 200 mg  200 mg Oral TID    lamoTRIgine (LaMICtal) tab 100 mg  100 mg Oral Daily    memantine (Namenda) tab 5 mg  5 mg Oral BID    sevelamer carbonate (Renvela) tab 2,400 mg  2,400 mg Oral TID CC    tamsulosin (Flomax) cap 0.4 mg  0.4 mg Oral Daily    HYDROcodone-acetaminophen (Norco) 5-325 MG per tab 1 tablet  1 tablet Oral Q6H PRN    acetaminophen (Tylenol Extra Strength) tab 500 mg  500 mg Oral Q4H PRN       Laboratory Data:  Lab Results   Component Value Date    WBC 6.5 01/20/2025    HGB 9.2 01/20/2025    HCT 28.7 01/20/2025    .0 01/20/2025     Lab Results   Component Value Date    INR 1.11 12/25/2024    INR 1.00 11/06/2024    INR 1.06 09/29/2024     Lab Results   Component Value Date     01/20/2025     01/20/2025    K 3.9 01/20/2025    K 3.9 01/20/2025     01/20/2025     01/20/2025    CO2 23.0 01/20/2025    CO2 23.0 01/20/2025    BUN 47 01/20/2025    BUN 47 01/20/2025    CREATSERUM 8.41 01/20/2025    CREATSERUM 8.41 01/20/2025     01/20/2025     01/20/2025    CA 7.9 01/20/2025    CA 7.9 01/20/2025    MG 1.6 01/20/2025       Telemetry: No malignant tachyarrhythmias or bradyarrhythmias      Thank you for allowing our practice to participate in the care of your patient. Please do not hesitate to contact me if you have any questions.    Yesenia Rubin MD

## 2025-01-21 LAB
ALBUMIN SERPL-MCNC: 4.1 G/DL (ref 3.2–4.8)
ANION GAP SERPL CALC-SCNC: 14 MMOL/L (ref 0–18)
BUN BLD-MCNC: 31 MG/DL (ref 9–23)
CALCIUM BLD-MCNC: 8.9 MG/DL (ref 8.7–10.6)
CHLORIDE SERPL-SCNC: 103 MMOL/L (ref 98–112)
CO2 SERPL-SCNC: 23 MMOL/L (ref 21–32)
CREAT BLD-MCNC: 6.65 MG/DL
EGFRCR SERPLBLD CKD-EPI 2021: 10 ML/MIN/1.73M2 (ref 60–?)
ERYTHROCYTE [DISTWIDTH] IN BLOOD BY AUTOMATED COUNT: 15.4 %
GLUCOSE BLD-MCNC: 100 MG/DL (ref 70–99)
GLUCOSE BLD-MCNC: 103 MG/DL (ref 70–99)
GLUCOSE BLD-MCNC: 121 MG/DL (ref 70–99)
GLUCOSE BLD-MCNC: 148 MG/DL (ref 70–99)
GLUCOSE BLD-MCNC: 98 MG/DL (ref 70–99)
HCT VFR BLD AUTO: 31.1 %
HGB BLD-MCNC: 10.4 G/DL
MCH RBC QN AUTO: 31.7 PG (ref 26–34)
MCHC RBC AUTO-ENTMCNC: 33.4 G/DL (ref 31–37)
MCV RBC AUTO: 94.8 FL
OSMOLALITY SERPL CALC.SUM OF ELEC: 297 MOSM/KG (ref 275–295)
PHOSPHATE SERPL-MCNC: 6.6 MG/DL (ref 2.4–5.1)
PLATELET # BLD AUTO: 205 10(3)UL (ref 150–450)
POTASSIUM SERPL-SCNC: 4 MMOL/L (ref 3.5–5.1)
RBC # BLD AUTO: 3.28 X10(6)UL
SODIUM SERPL-SCNC: 140 MMOL/L (ref 136–145)
WBC # BLD AUTO: 6.9 X10(3) UL (ref 4–11)

## 2025-01-21 PROCEDURE — 94760 N-INVAS EAR/PLS OXIMETRY 1: CPT

## 2025-01-21 PROCEDURE — 80069 RENAL FUNCTION PANEL: CPT | Performed by: INTERNAL MEDICINE

## 2025-01-21 PROCEDURE — 85027 COMPLETE CBC AUTOMATED: CPT | Performed by: HOSPITALIST

## 2025-01-21 PROCEDURE — 82962 GLUCOSE BLOOD TEST: CPT

## 2025-01-21 RX ORDER — LOSARTAN POTASSIUM 100 MG/1
100 TABLET ORAL DAILY
Qty: 30 TABLET | Refills: 0 | Status: ON HOLD | OUTPATIENT
Start: 2025-01-21 | End: 2025-02-20

## 2025-01-21 RX ORDER — CYCLOBENZAPRINE HCL 5 MG
5 TABLET ORAL 3 TIMES DAILY PRN
Status: DISCONTINUED | OUTPATIENT
Start: 2025-01-21 | End: 2025-01-22

## 2025-01-21 RX ORDER — ALBUMIN (HUMAN) 12.5 G/50ML
25 SOLUTION INTRAVENOUS
Status: DISCONTINUED | OUTPATIENT
Start: 2025-01-21 | End: 2025-01-22

## 2025-01-21 RX ORDER — NIFEDIPINE 30 MG
30 TABLET, EXTENDED RELEASE ORAL DAILY
Qty: 30 TABLET | Refills: 0 | Status: ON HOLD | OUTPATIENT
Start: 2025-01-21 | End: 2025-02-20

## 2025-01-21 NOTE — PROGRESS NOTES
CC: follow-up hospital admission pain, weakness    SUBJECTIVE:  Interval History:      Sleeping when walked in room  When awaken states he feels terrible    Having severe neck pain  States he has chronic neck pain and was told he may need surgery    OBJECTIVE:  Scheduled Meds:    lidocaine-menthol  1 patch Transdermal Daily    heparin  5,000 Units Subcutaneous Q8H MARTHA    methocarbamol  500 mg Oral Once    carvedilol  25 mg Oral BID with meals    NIFEdipine ER  30 mg Oral Daily    losartan  100 mg Oral Daily    isosorbide mononitrate ER  30 mg Oral Daily    fluticasone propionate  1 spray Each Nare Daily    [Held by provider] fenofibrate micronized  134 mg Oral Nightly    amphetamine-dextroamphetamine  10 mg Oral BID AC    Vancomycin IV  1 each Intravenous See Admin Instructions (RX holding)    cloNIDine  0.1 mg Oral BID    hydrALAZINE  25 mg Oral Q8H MARTHA    sodium chloride   Intravenous Once    doxycycline  100 mg Oral BID    ARIPiprazole  15 mg Oral Daily    buPROPion ER  150 mg Oral Daily    calcium acetate  667 mg Oral Daily    FLUoxetine HCl  40 mg Oral Daily    gabapentin  200 mg Oral TID    lamoTRIgine  100 mg Oral Daily    memantine  5 mg Oral BID    sevelamer carbonate  2,400 mg Oral TID CC    tamsulosin  0.4 mg Oral Daily     Continuous Infusions:   PRN Meds:   cyclobenzaprine    ipratropium-albuterol    docusate sodium    labetalol    hydrALAzine    nitroglycerin    albuterol    HYDROcodone-acetaminophen    acetaminophen    PHYSICAL EXAM  Vital signs: Temp:  [97.8 °F (36.6 °C)-98.4 °F (36.9 °C)] 97.8 °F (36.6 °C)  Pulse:  [82-96] 82  Resp:  [18-19] 18  BP: (109-159)/(69-98) 109/69  SpO2:  [76 %-98 %] 97 %      GENERAL - AAO  EYES- sclera anicteric,    HENT- normocephalic,   NECK - no JVD  CV- RRR  RESP - ctab anteriorly, normal resp effort  ABDOMEN- soft, NT/ND   EXT- no LE edema   No shoulder ttp / no pain with passive ROM       Data Review:   Labs:   Recent Labs   Lab 01/18/25  0957 01/18/25  1405  01/19/25  0623 01/20/25  0641 01/21/25  0740   WBC 10.1  --  10.0 6.5 6.9   HGB 8.5* 8.0* 9.9* 9.2* 10.4*   MCV 96.6  --  95.8 98.6 94.8   .0  --  206.0 181.0 205.0       Recent Labs   Lab 01/18/25  0957 01/19/25  0623 01/20/25  0641 01/21/25  0740    138 140  140 140   K 3.9 3.6 3.9  3.9 4.0    102 104  104 103   CO2 21.0 24.0 23.0  23.0 23.0   BUN 78* 32* 47*  47* 31*   CREATSERUM 11.17* 6.79* 8.41*  8.41* 6.65*   CA 8.3* 8.6* 7.9*  7.9* 8.9   MG  --  1.8 1.6  --    PHOS  --   --  7.0* 6.6*   * 105* 138*  138* 100*       Recent Labs   Lab 01/18/25  0957 01/20/25  0641 01/21/25  0740   ALT 30  --   --    AST 37*  --   --    ALB 4.2 3.9 4.1       Recent Labs   Lab 01/20/25  0458 01/20/25  1130 01/20/25  2207 01/21/25  0441 01/21/25  1242   PGLU 126* 110* 140* 103* 98           ASSESSMENT/PLAN:    Patient is a 44 year old male with PMH sig for CAD, ESRD on HD, HTN, chf, anemia, here for sob, diffuse body aches     Impression     -abnormal EKG - CARYN in V3, not meeting criteria for STEMI  -elevated troponin - chronic   -HTN  -non obstructive CAD on angiogram 2024  -sp MV repair 1994 and 2022  -chronic d CHF, volume management with HD     -anemia, acute on chronic   -ESRD on HD  -bipolar do        Plan     *ID / Pulm  -pt with URI like symptoms. Fevers overnight the first day. Not septic. No further fevers.   Rapid covid /flu neg, extended panel positive for coronavirus (non covid)  -CXR with RLL consolidation. Possible superimposed bacterial pna as well. Started on abx - rocephin / doxy  -check urine legionella / strep / sputum cx if able  - blood cx with 1 of 2 with staph epi - vanco added. Has central line. Repeat blood cx ngtd.  ID following     *CV  -stat bedside echo overall ok  -trend trop / EKG - decreasing   -if worsening symptoms and uptrending trop / changes in EKG may need angiogram  -sp aspirin in ER, cont  -cont rest of cardiac meds     *GI  -hgb lower than  recently  -trend hgb  -dropped to 8g  -recent admission for hematochezia, had neg tagged bleeding scan. Was also at [facility] - saw GI, though to have hemorrhoidal bleeding  -check iron stores  - iron deficient   -given 1u with HD on admission, hgb stable  -GI saw     *renal  -dw renal, cont HD per renal     *chronic conditions   -home meds as able    -having neck pain - add flexeril, appear more msk but if persist may need repeat imaging.       Scds       Will continue to follow while hospitalized. Please page me or the on-call hospitalist with questions or concerns.    Mathew Brown Hospitalist  [phone]  Answering Service: [phone]

## 2025-01-21 NOTE — PLAN OF CARE
Assumed pt care at 0730. A&O x 4. On room air. Reports chronic neck pain, MD notified, lidocaine patch applied. Vital signs stable, NSR on tele. Up with standby assist. (Additional assessments added based on pt, edema, wounds, etc).    Plan of care: IV abx, daily weights, strict I&Os, HD MWF.    Plan of care updated with patient. Questions answered, pt verbalized understanding. Call light is within reach. All needs met at this time.    Problem: Patient/Family Goals  Goal: Patient/Family Long Term Goal  Description: Patient's Long Term Goal: to go home    Interventions:  - increase ambulation  - See additional Care Plan goals for specific interventions  1/21/2025 1137 by Sruthi Eaton, RN  Outcome: Progressing  1/21/2025 1136 by Sruthi Eaton, RN  Outcome: Progressing  Goal: Patient/Family Short Term Goal  Description: Patient's Short Term Goal: increase mobility    Interventions:   - encourage ambulation  - See additional Care Plan goals for specific interventions  1/21/2025 1137 by Sruthi Eaton, RN  Outcome: Progressing  1/21/2025 1136 by Sruthi Eaton, RN  Outcome: Progressing     Problem: PAIN - ADULT  Goal: Verbalizes/displays adequate comfort level or patient's stated pain goal  Description: INTERVENTIONS:  - Encourage pt to monitor pain and request assistance  - Assess pain using appropriate pain scale  - Administer analgesics based on type and severity of pain and evaluate response  - Implement non-pharmacological measures as appropriate and evaluate response  - Consider cultural and social influences on pain and pain management  - Manage/alleviate anxiety  - Utilize distraction and/or relaxation techniques  - Monitor for opioid side effects  - Notify MD/LIP if interventions unsuccessful or patient reports new pain  - Anticipate increased pain with activity and pre-medicate as appropriate  1/21/2025 1137 by Sruthi Eaton, RN  Outcome: Progressing  1/21/2025 1136 by  Sruthi Eaton, RN  Outcome: Progressing     Problem: RESPIRATORY - ADULT  Goal: Achieves optimal ventilation and oxygenation  Description: INTERVENTIONS:  - Assess for changes in respiratory status  - Assess for changes in mentation and behavior  - Position to facilitate oxygenation and minimize respiratory effort  - Oxygen supplementation based on oxygen saturation or ABGs  - Provide Smoking Cessation handout, if applicable  - Encourage broncho-pulmonary hygiene including cough, deep breathe, Incentive Spirometry  - Assess the need for suctioning and perform as needed  - Assess and instruct to report SOB or any respiratory difficulty  - Respiratory Therapy support as indicated  - Manage/alleviate anxiety  - Monitor for signs/symptoms of CO2 retention  1/21/2025 1137 by Sruthi Eaton RN  Outcome: Progressing  1/21/2025 1136 by Sruthi Eaton RN  Outcome: Progressing     Problem: CARDIOVASCULAR - ADULT  Goal: Maintains optimal cardiac output and hemodynamic stability  Description: INTERVENTIONS:  - Monitor vital signs, rhythm, and trends  - Monitor for bleeding, hypotension and signs of decreased cardiac output  - Evaluate effectiveness of vasoactive medications to optimize hemodynamic stability  - Monitor arterial and/or venous puncture sites for bleeding and/or hematoma  - Assess quality of pulses, skin color and temperature  - Assess for signs of decreased coronary artery perfusion - ex. Angina  - Evaluate fluid balance, assess for edema, trend weights  1/21/2025 1137 by Sruthi Eaton, RN  Outcome: Progressing  1/21/2025 1136 by Sruthi Eaton, RN  Outcome: Progressing  Goal: Absence of cardiac arrhythmias or at baseline  Description: INTERVENTIONS:  - Continuous cardiac monitoring, monitor vital signs, obtain 12 lead EKG if indicated  - Evaluate effectiveness of antiarrhythmic and heart rate control medications as ordered  - Initiate emergency measures for life threatening  arrhythmias  - Monitor electrolytes and administer replacement therapy as ordered  1/21/2025 1137 by Sruthi Eaton, RN  Outcome: Progressing  1/21/2025 1136 by Sruthi Eaton, RN  Outcome: Progressing

## 2025-01-21 NOTE — PLAN OF CARE
Pt chief complaint upon admission: SOB. Received pt at 1930 during handoff.    A&Ox 4, labile mood, w/ some rambling. Strength: WNL. RA, 2L NC placed overnight for sleep. Lungs sounds w/ crackles. NSR/ST on tele, normotensive. BP meds had been spaced on while on HD earlier today. Meds given as ordered, BP stable. Complains of head and neck pain. Pt cutting off RN mid-sentence when asking about pain, \"8/10, I want dilaudid.\" Only Norco PRN ordered, pt frustrated but agreed to try norco first. Pt asleep within 10 mins of administration of medication.    GI/: Diminished urine production per pt. Still need urine sample. Pt reports constipation, colace given. Abd distended but soft.  Activity: SBA/Up ad osvaldo.  LDA: PIV & AVF on LUE, bruit & thrill present. HD cath on RIJ noted.    Skin: CDI.  Incision: NA.     All medications given as ordered. Pt resting in bed w/ all safety measures in place and call light within reach. Patient updated on plan of care, all questions answered.    Plan is for regular HD & BP control. IV abx, repeat Bcx pending.     Problem: PAIN - ADULT  Goal: Verbalizes/displays adequate comfort level or patient's stated pain goal  Description: INTERVENTIONS:  - Encourage pt to monitor pain and request assistance  - Assess pain using appropriate pain scale  - Administer analgesics based on type and severity of pain and evaluate response  - Implement non-pharmacological measures as appropriate and evaluate response  - Consider cultural and social influences on pain and pain management  - Manage/alleviate anxiety  - Utilize distraction and/or relaxation techniques  - Monitor for opioid side effects  - Notify MD/LIP if interventions unsuccessful or patient reports new pain  - Anticipate increased pain with activity and pre-medicate as appropriate  Outcome: Progressing     Problem: RESPIRATORY - ADULT  Goal: Achieves optimal ventilation and oxygenation  Description: INTERVENTIONS:  - Assess for changes in  respiratory status  - Assess for changes in mentation and behavior  - Position to facilitate oxygenation and minimize respiratory effort  - Oxygen supplementation based on oxygen saturation or ABGs  - Provide Smoking Cessation handout, if applicable  - Encourage broncho-pulmonary hygiene including cough, deep breathe, Incentive Spirometry  - Assess the need for suctioning and perform as needed  - Assess and instruct to report SOB or any respiratory difficulty  - Respiratory Therapy support as indicated  - Manage/alleviate anxiety  - Monitor for signs/symptoms of CO2 retention  Outcome: Progressing     Problem: CARDIOVASCULAR - ADULT  Goal: Maintains optimal cardiac output and hemodynamic stability  Description: INTERVENTIONS:  - Monitor vital signs, rhythm, and trends  - Monitor for bleeding, hypotension and signs of decreased cardiac output  - Evaluate effectiveness of vasoactive medications to optimize hemodynamic stability  - Monitor arterial and/or venous puncture sites for bleeding and/or hematoma  - Assess quality of pulses, skin color and temperature  - Assess for signs of decreased coronary artery perfusion - ex. Angina  - Evaluate fluid balance, assess for edema, trend weights  Outcome: Progressing  Goal: Absence of cardiac arrhythmias or at baseline  Description: INTERVENTIONS:  - Continuous cardiac monitoring, monitor vital signs, obtain 12 lead EKG if indicated  - Evaluate effectiveness of antiarrhythmic and heart rate control medications as ordered  - Initiate emergency measures for life threatening arrhythmias  - Monitor electrolytes and administer replacement therapy as ordered  Outcome: Progressing

## 2025-01-21 NOTE — PROGRESS NOTES
[facility]   part of [facility] Infectious Disease Progress Note    Godwin Fonseca Patient Status:  Inpatient    1980 MRN [id number]   [facility] 2NE-A Attending Mathew Elizabeth,    Hosp Day # 3 PCP Adrian Small MD     Subjective:  Pt sleeping comfortably     Objective:    Allergies:  Allergies[1]    Medications:    Current Facility-Administered Medications:     lidocaine-menthol 4-1 % patch 1 patch, 1 patch, Transdermal, Daily    ipratropium-albuterol (Duoneb) 0.5-2.5 (3) MG/3ML inhalation solution 3 mL, 3 mL, Nebulization, Q4H PRN    heparin (Porcine) 5000 UNIT/ML injection 5,000 Units, 5,000 Units, Subcutaneous, Q8H MARTHA    docusate sodium (Colace) cap 100 mg, 100 mg, Oral, BID PRN    methocarbamol (Robaxin) tab 500 mg, 500 mg, Oral, Once    carvedilol (Coreg) tab 25 mg, 25 mg, Oral, BID with meals    NIFEdipine ER (Procardia-XL) 24 hr tab 30 mg, 30 mg, Oral, Daily    labetalol (Trandate) 5 mg/mL injection 10 mg, 10 mg, Intravenous, Q4H PRN    hydrALAzine (Apresoline) 20 mg/mL injection 10 mg, 10 mg, Intravenous, Q4H PRN    losartan (Cozaar) tab 100 mg, 100 mg, Oral, Daily    isosorbide mononitrate ER (Imdur) 24 hr tab 30 mg, 30 mg, Oral, Daily    fluticasone propionate (Flonase) 50 MCG/ACT nasal suspension 1 spray, 1 spray, Each Nare, Daily    [Held by provider] fenofibrate micronized (Lofibra) cap 134 mg, 134 mg, Oral, Nightly    amphetamine-dextroamphetamine (Adderall) tab 10 mg, 10 mg, Oral, BID AC    Vancomycin: PHARMACY DOSING, 1 each, Intravenous, See Admin Instructions (RX holding)    nitroglycerin (Nitrostat) SL tab 0.4 mg, 0.4 mg, Sublingual, Q5 Min PRN    cloNIDine (Catapres) tab 0.1 mg, 0.1 mg, Oral, BID    hydrALAZINE (Apresoline) tab 25 mg, 25 mg, Oral, Q8H MARTHA    sodium chloride 0.9% infusion, , Intravenous, Once    doxycycline (Vibramycin) cap 100 mg, 100 mg, Oral, BID    albuterol (Ventolin HFA) 108 (90 Base) MCG/ACT inhaler 2 puff, 2 puff, Inhalation,  Q6H PRN    ARIPiprazole (Abilify) tab 15 mg, 15 mg, Oral, Daily    buPROPion ER (Wellbutrin XL) 24 hr tab 150 mg, 150 mg, Oral, Daily    calcium acetate (Phoslo) cap 667 mg, 667 mg, Oral, Daily    FLUoxetine (PROzac) cap 40 mg, 40 mg, Oral, Daily    gabapentin (Neurontin) cap 200 mg, 200 mg, Oral, TID    lamoTRIgine (LaMICtal) tab 100 mg, 100 mg, Oral, Daily    memantine (Namenda) tab 5 mg, 5 mg, Oral, BID    sevelamer carbonate (Renvela) tab 2,400 mg, 2,400 mg, Oral, TID CC    tamsulosin (Flomax) cap 0.4 mg, 0.4 mg, Oral, Daily    HYDROcodone-acetaminophen (Norco) 5-325 MG per tab 1 tablet, 1 tablet, Oral, Q6H PRN    acetaminophen (Tylenol Extra Strength) tab 500 mg, 500 mg, Oral, Q4H PRN    Physical Exam:  General: Sleeping. No apparent distress.  Vital Signs:  Blood pressure (!) 140/92, pulse 88, temperature 97.8 °F (36.6 °C), temperature source Oral, resp. rate 18, height 188 cm (6' 2\"), weight 224 lb 3.2 oz (101.7 kg), SpO2 96%.   Temp (24hrs), Av °F (36.7 °C), Min:97.8 °F (36.6 °C), Max:98.4 °F (36.9 °C)      HEENT: Exam is unremarkable.   Lungs: Clear to auscultation bilaterally.  Cardiac: Regular rate and rhythm. No murmur.  Abdomen:  Soft, non-distended, non-tender, with no rebound or guarding.   Extremities:  No lower extremity edema noted.  Without clubbing or cyanosis.    Skin: Normal texture and turgor.  Neurologic: Cranial nerves are grossly intact.      Labs:  Lab Results   Component Value Date    WBC 6.9 2025    HGB 10.4 2025    HCT 31.1 2025    .0 2025    CREATSERUM 6.65 2025    BUN 31 2025     2025    K 4.0 2025     2025    CO2 23.0 2025     2025    CA 8.9 2025    ALB 4.1 2025    PHOS 6.6 2025         Assessment/Plan:    1.  Staph epi bacteremia  -2/2 blood cultures with staph epi in 25, repeat on  NG  -no obvious vegetation seen on TTE  -on IV vancomycin  2.  PNA  -CXR with  RLL consolidation  -RVP positive for coronavirus  -on IV vancomycin and doxycycline  3.  ESRD on HD  4.  Hx of HD line site infection/cellulitis in 10/2024  -blood cultures NGTD  -s/p IV abx via HD    If you have any questions or concerns please call [facility] Infectious Disease at [phone].     PILAR Dorman         [1]   Allergies  Allergen Reactions    Hydrochlorothiazide RASH and HIVES

## 2025-01-21 NOTE — PROGRESS NOTES
Cardiology Progress Note    Godwin Fonseca Patient Status:  Inpatient    1980 MRN [id number]   Location [facility] 2NE-A Attending Mathew Elizabeth, DO   Hosp Day # 3 PCP Adrian Small MD     Assessment:     Diffuse pain throughout his body including head, ear, neck, shoulder, chest and abdomen on admission  EKG with mild ST elevation in V3 not meeting criteria for STEMI without reciprocal changes- improved with BP management   Chronic troponin elevation.  Downtrended to 493  As high as 2000 in the past  ESRD on dialysis.  Noncompliance  Hypertension: Noncompliance with antihypertensives  Tachycardida-resolved  Bipolar  Nonobstructive minimal CAD on angiogram done in   Status post mitral valve repair in  with redo robotic assisted mitral valve repair in         Plan:  EKG not meeting strict criteria for STEMI on admission and patient has history of ST changes due to LVH.  Troponin was more elevated than previous admission but he has missed dialysis.  It is lower than his peak of 2000.  Repeat EKG once blood pressure came down showed normalization without significant ST changes from baseline.  Echo stable. Given normalization of EKG with blood pressure and normal LVEF we will hold off on cardiac catheterization for now  Monitor anemia which could be exacerbating chest pain in the setting of hypertension  Continue current BP regimen. Well controlled   I again explained the importance of needing to commit to dialysis and not miss sessions.  He is a high risk for readmission patient with multiple admissions due to noncompliance with therapy.  Cardiology will follow peripherally.  Please call with questions.  He is to make an appointment to see his outpatient cardiologist within the next 2 weeks    Subjective:  No acute issues overnight     Objective:  /69 (BP Location: Right arm)   Pulse 82   Temp 97.8 °F (36.6 °C) (Oral)   Resp 18   Ht 6' 2\" (1.88 m)   Wt 224 lb 3.2 oz (101.7 kg)    SpO2 97%   BMI 28.79 kg/m²   Temp (24hrs), Av.9 °F (36.6 °C), Min:97.8 °F (36.6 °C), Max:98.4 °F (36.9 °C)      Intake/Output Summary (Last 24 hours) at 2025 1357  Last data filed at 2025 1234  Gross per 24 hour   Intake 1450 ml   Output 4000 ml   Net -2550 ml     Wt Readings from Last 3 Encounters:   25 224 lb 3.2 oz (101.7 kg)   25 244 lb 11.4 oz (111 kg)   25 245 lb (111.1 kg)       General: Awake and alert; in no acute distress  Cardiac: Regular rate and regular rhythm; no murmurs/rubs/gallops are appreciated  Lungs: Clear to auscultation bilaterally; no accessory muscle use  Abdomen: Soft, non-tender; bowel sounds are normoactive  Extremities: No clubbing/cyanosis; moves all 4 extremities normally    Current Facility-Administered Medications   Medication Dose Route Frequency    lidocaine-menthol 4-1 % patch 1 patch  1 patch Transdermal Daily    cyclobenzaprine (Flexeril) tab 5 mg  5 mg Oral TID PRN    ipratropium-albuterol (Duoneb) 0.5-2.5 (3) MG/3ML inhalation solution 3 mL  3 mL Nebulization Q4H PRN    heparin (Porcine) 5000 UNIT/ML injection 5,000 Units  5,000 Units Subcutaneous Q8H MARTHA    docusate sodium (Colace) cap 100 mg  100 mg Oral BID PRN    methocarbamol (Robaxin) tab 500 mg  500 mg Oral Once    carvedilol (Coreg) tab 25 mg  25 mg Oral BID with meals    NIFEdipine ER (Procardia-XL) 24 hr tab 30 mg  30 mg Oral Daily    labetalol (Trandate) 5 mg/mL injection 10 mg  10 mg Intravenous Q4H PRN    hydrALAzine (Apresoline) 20 mg/mL injection 10 mg  10 mg Intravenous Q4H PRN    losartan (Cozaar) tab 100 mg  100 mg Oral Daily    isosorbide mononitrate ER (Imdur) 24 hr tab 30 mg  30 mg Oral Daily    fluticasone propionate (Flonase) 50 MCG/ACT nasal suspension 1 spray  1 spray Each Nare Daily    [Held by provider] fenofibrate micronized (Lofibra) cap 134 mg  134 mg Oral Nightly    amphetamine-dextroamphetamine (Adderall) tab 10 mg  10 mg Oral BID AC    Vancomycin: PHARMACY  DOSING  1 each Intravenous See Admin Instructions (RX holding)    nitroglycerin (Nitrostat) SL tab 0.4 mg  0.4 mg Sublingual Q5 Min PRN    cloNIDine (Catapres) tab 0.1 mg  0.1 mg Oral BID    hydrALAZINE (Apresoline) tab 25 mg  25 mg Oral Q8H MARTHA    sodium chloride 0.9% infusion   Intravenous Once    doxycycline (Vibramycin) cap 100 mg  100 mg Oral BID    albuterol (Ventolin HFA) 108 (90 Base) MCG/ACT inhaler 2 puff  2 puff Inhalation Q6H PRN    ARIPiprazole (Abilify) tab 15 mg  15 mg Oral Daily    buPROPion ER (Wellbutrin XL) 24 hr tab 150 mg  150 mg Oral Daily    calcium acetate (Phoslo) cap 667 mg  667 mg Oral Daily    FLUoxetine (PROzac) cap 40 mg  40 mg Oral Daily    gabapentin (Neurontin) cap 200 mg  200 mg Oral TID    lamoTRIgine (LaMICtal) tab 100 mg  100 mg Oral Daily    memantine (Namenda) tab 5 mg  5 mg Oral BID    sevelamer carbonate (Renvela) tab 2,400 mg  2,400 mg Oral TID CC    tamsulosin (Flomax) cap 0.4 mg  0.4 mg Oral Daily    HYDROcodone-acetaminophen (Norco) 5-325 MG per tab 1 tablet  1 tablet Oral Q6H PRN    acetaminophen (Tylenol Extra Strength) tab 500 mg  500 mg Oral Q4H PRN       Laboratory Data:  Lab Results   Component Value Date    WBC 6.9 01/21/2025    HGB 10.4 01/21/2025    HCT 31.1 01/21/2025    .0 01/21/2025     Lab Results   Component Value Date    INR 1.11 12/25/2024    INR 1.00 11/06/2024    INR 1.06 09/29/2024     Lab Results   Component Value Date     01/21/2025    K 4.0 01/21/2025     01/21/2025    CO2 23.0 01/21/2025    BUN 31 01/21/2025    CREATSERUM 6.65 01/21/2025     01/21/2025    CA 8.9 01/21/2025       Telemetry: No malignant tachyarrhythmias or bradyarrhythmias      Thank you for allowing our practice to participate in the care of your patient. Please do not hesitate to contact me if you have any questions.    Yesenia Rubin MD

## 2025-01-21 NOTE — PAYOR COMM NOTE
--------------  ADMISSION REVIEW     Payor: [employer]  Subscriber #:  [id number]  Authorization Number: [id number]    Admit date: 1/18/25  Admit time:  3:56 PM       [facility] H[facility]P      History of Present Illness: Patient is a 44 year old male with PMH sig for CAD, ESRD on HD, HTN, chf, anemia, here for sob, diffuse body aches     Reports he has not been feeling well since yesterday. Reports generalized body aches,headaches, sinus drainage, cough, on / off temps / chills , sob  Reports intermittent rectal bleeding. Does report bleeding yesterday   Missed HD yesterday     BP (!) 151/99   Pulse 112   Temp 98.6 °F (37 °C) (Oral)   Resp (!) 29   Ht 6' 2\" (1.88 m)   Wt 250 lb (113.4 kg)   SpO2 91%   BMI 32.10 kg/m²   General:  Alert, appears in pain   Head:  Normocephalic,     Eyes:  Sclera anicteric    Throat: dry   Neck: Supple,    Lungs:   Clear to auscultation bilaterally. Normal effort   Chest wall:  No tenderness or deformity.   Heart:  tachycardic   Abdomen:   Soft, NT/ND    Extremities: Atraumatic,    Skin: No visible rashes or lesions.    Neurologic: Normal strength, no focal deficit appreciated      Lab 01/18/25  0957   WBC 10.1   HGB 8.5*   MCV 96.6   .0      Lab 01/18/25  0957      K 3.9      CO2 21.0   BUN 78*   CREATSERUM 11.17*   *   CA 8.3*      Lab 01/18/25  0957   ALT 30   AST 37*   ALB 4.2      XR CHEST AP PORTABLE  (CPT=71045)  Result Date: 1/18/2025  CONCLUSION:  Increased right lower lobe consolidation right pleural effusion.   LOCATION:  [facility]      Dictated by (CST): Ryan Hernández MD on 1/18/2025 at 10:52 AM     Finalized by (CST): Ryan Hernández MD on 1/18/2025 at 10:52 AM         ASSESSMENT / PLAN:      Patient is a 44 year old male with PMH sig for CAD, ESRD on HD, HTN, chf, anemia, here for sob, diffuse body aches     Impression     -abnormal EKG - CARYN in V3, not meeting criteria for STEMI  -elevated troponin - chronic    -HTN  -non obstructive CAD on angiogram 2024  -sp MV repair 1994 and 2022  -chronic d CHF, volume management with HD     -anemia, acute on chronic   -ESRD on HD  -bipolar do        Plan     *ID / Pulm  -pt with URI like symptoms. Rapid covid /flu neg, will check extended rvp  -CXR with RLL consolidation. Will start  abx  -check urine legionella / strep / sputum cx if able  - blood cx      *CV  -stat bedside echo reportedly ok, offical read pending  -trend trop / EKG  -if worsening symptoms and uptrending trop / changes in EKG may need angiogram  -sp aspirin in ER, cont  -cont rest of cardiac meds     *GI  -hgb lower than recently  -trend hgb  -recent admission for hematochezia, had neg tagged bleeding scan. Was also at [facility] - saw GI, though to have hemorrhoidal bleeding  -check iron stores   -transfuse to keep > 8 given his cardiac symptoms and dropping hgb will give 1 unit with HD  -GI eval     *renal  -dw renal, plan for HD today     *chronic conditions   -home meds as able     Scds             1/19/25     SUBJECTIVE:  Interval History:    Sleeping but arousable.    Having fever overnight     Scheduled Meds:    methocarbamol  500 mg Oral Once    carvedilol  25 mg Oral BID with meals    NIFEdipine ER  30 mg Oral Daily    losartan  100 mg Oral Daily    isosorbide mononitrate ER  30 mg Oral Daily    cloNIDine  0.1 mg Oral BID    hydrALAZINE  25 mg Oral Q8H MARTHA    sodium chloride   Intravenous Once    cefTRIAXone  2 g Intravenous Q24H    doxycycline  100 mg Oral BID    ARIPiprazole  15 mg Oral Daily    buPROPion ER  150 mg Oral Daily    calcium acetate  667 mg Oral Daily    FLUoxetine HCl  40 mg Oral Daily    gabapentin  200 mg Oral TID    lamoTRIgine  100 mg Oral Daily    memantine  5 mg Oral BID    sevelamer carbonate  2,400 mg Oral TID CC    tamsulosin  0.4 mg Oral Daily       PHYSICAL EXAM  Vital signs: Temp:  [98.1 °F (36.7 °C)-100.4 °F (38 °C)] 98.9 °F (37.2 °C)  Pulse:  [] 97  Resp:   [20-37] 24  BP: (144-177)/() 144/116  SpO2:  [90 %-99 %] 90 %      GENERAL - AAO  EYES- sclera anicteric,    HENT- normocephalic,   NECK - no JVD  CV- RRR  RESP - CTAB, normal resp effort  ABDOMEN- soft, NT/ND   EXT- no LE edema      Lab 01/18/25  0957 01/18/25  1405 01/19/25  0623   WBC 10.1  --  10.0   HGB 8.5* 8.0* 9.9*   MCV 96.6  --  95.8   .0  --  206.0      Lab 01/18/25  0957 01/19/25  0623    138   K 3.9 3.6    102   CO2 21.0 24.0   BUN 78* 32*   CREATSERUM 11.17* 6.79*   CA 8.3* 8.6*   MG  --  1.8   * 105*      Lab 01/18/25  0957   ALT 30   AST 37*   ALB 4.2         ASSESSMENT/PLAN:     Patient is a 44 year old male with PMH sig for CAD, ESRD on HD, HTN, chf, anemia, here for sob, diffuse body aches     Impression     -abnormal EKG - CARYN in V3, not meeting criteria for STEMI  -elevated troponin - chronic   -HTN  -non obstructive CAD on angiogram 2024  -sp MV repair 1994 and 2022  -chronic d CHF, volume management with HD     -anemia, acute on chronic   -ESRD on HD     Plan     *ID / Pulm  -pt with URI like symptoms. Fevers overnight. Not septic.   Rapid covid /flu neg, extended panel positive for coronavirus (non covid)  -CXR with RLL consolidation. Possible superimposed bacterial pna as well. Started on abx  -check urine legionella / strep / sputum cx if able  - blood cx ngtd     *CV  -stat bedside echo overall ok  -trend trop / EKG - decreasing   -if worsening symptoms and uptrending trop / changes in EKG may need angiogram  -sp aspirin in ER, cont  -cont rest of cardiac meds     *GI  -hgb lower than recently  -trend hgb  -dropped to 8g  -recent admission for hematochezia, had neg tagged bleeding scan. Was also at [facility] - saw GI, though to have hemorrhoidal bleeding  -check iron stores   -given 1u with HD yesterday  -GI eval     *renal  -dw renal, cont HD per renal     *chronic conditions   -home meds as able     Given his increased lethargy today will check  ct head ro CNS etiology  Resume vyvanse      Scds        Addendum     Pt with positive blood cx 1/ 2 bottles. Appears to be staph epi. Possibly contaminant but he has a central line and risk for seeding. Will start on vanco pending final cx. Will have ID see as well                1/20/25     Interval History:    Still coughing     Scheduled Meds:    vancomycin  1,000 mg Intravenous Once    methocarbamol  500 mg Oral Once    carvedilol  25 mg Oral BID with meals    NIFEdipine ER  30 mg Oral Daily    losartan  100 mg Oral Daily    isosorbide mononitrate ER  30 mg Oral Daily    fluticasone propionate  1 spray Each Nare Daily    [Held by provider] fenofibrate micronized  134 mg Oral Nightly    amphetamine-dextroamphetamine  10 mg Oral BID AC    Vancomycin IV  1 each Intravenous See Admin Instructions (RX holding)    cloNIDine  0.1 mg Oral BID    hydrALAZINE  25 mg Oral Q8H MARTHA    sodium chloride   Intravenous Once    cefTRIAXone  2 g Intravenous Q24H    doxycycline  100 mg Oral BID    ARIPiprazole  15 mg Oral Daily    buPROPion ER  150 mg Oral Daily    calcium acetate  667 mg Oral Daily    FLUoxetine HCl  40 mg Oral Daily    gabapentin  200 mg Oral TID    lamoTRIgine  100 mg Oral Daily    memantine  5 mg Oral BID    sevelamer carbonate  2,400 mg Oral TID CC    tamsulosin  0.4 mg Oral Daily      PHYSICAL EXAM  Vital signs: Temp:  [97.5 °F (36.4 °C)-99.8 °F (37.7 °C)] 97.9 °F (36.6 °C)  Pulse:  [] 89  Resp:  [18-24] 20  BP: (108-146)/(64-98) 118/78  SpO2:  [90 %-97 %] 94 %     EYES- sclera anicteric,    HENT- normocephalic,   NECK - no JVD  CV- RRR  RESP - rales in RLL, normal resp effort  ABDOMEN- soft, NT/ND   EXT- no LE edema      Lab 01/18/25  0957 01/18/25  1405 01/19/25  0623 01/20/25  0641   WBC 10.1  --  10.0 6.5   HGB 8.5* 8.0* 9.9* 9.2*   MCV 96.6  --  95.8 98.6   .0  --  206.0 181.0      Lab 01/18/25  0957 01/19/25  0623 01/20/25  0641    138 140  140   K 3.9 3.6 3.9  3.9    102 104   104   CO2 21.0 24.0 23.0  23.0   BUN 78* 32* 47*  47*   CREATSERUM 11.17* 6.79* 8.41*  8.41*   CA 8.3* 8.6* 7.9*  7.9*   MG  --  1.8 1.6   PHOS  --   --  7.0*   * 105* 138*  138*      Lab 01/18/25  0957 01/20/25  0641   ALT 30  --    AST 37*  --    ALB 4.2 3.9      Lab 01/19/25  1205 01/19/25  1824 01/19/25  2108 01/20/25  0458   PGLU 93 124* 133* 126*       ASSESSMENT/PLAN:  Patient is a 44 year old male with PMH sig for CAD, ESRD on HD, HTN, chf, anemia, here for sob, diffuse body aches     Impression  -abnormal EKG - CARYN in V3, not meeting criteria for STEMI  -elevated troponin - chronic   -HTN  -non obstructive CAD on angiogram 2024  -sp MV repair 1994 and 2022  -chronic d CHF, volume management with HD     -anemia, acute on chronic   -ESRD on HD  -bipolar do        Plan     *ID / Pulm  -pt with URI like symptoms. Fevers overnight the first day. Not septic. No further fevers.   Rapid covid /flu neg, extended panel positive for coronavirus (non covid)  -CXR with RLL consolidation. Possible superimposed bacterial pna as well. Started on abx - rocephin / doxy  -check urine legionella / strep / sputum cx if able  - blood cx with 1 of 2 with staph epi - vanco added. Has central line. Repeat blood cx pending. Dw ID will see     *CV  -stat bedside echo overall ok  -trend trop / EKG - decreasing   -if worsening symptoms and uptrending trop / changes in EKG may need angiogram  -sp aspirin in ER, cont  -cont rest of cardiac meds     *GI  -hgb lower than recently  -trend hgb  -dropped to 8g  -recent admission for hematochezia, had neg tagged bleeding scan. Was also at [facility] - saw GI, though to have hemorrhoidal bleeding  -check iron stores  - iron deficient   -given 1u with HD on admission, hgb stable  -GI saw     *renal  -dw renal, cont HD per renal     *chronic conditions   -home meds as able        Scds                     INFECTIOUS DISEASE  Reason for consult: Staph epi bacteremia      Chief complaint: Fever     History of present illness: Godwin Fonseca is a 44 year old male with history of CAD, incisional disease on hemodialysis through dialysis line, hypertension, congestive heart failure, who presents here with worsening shortness of breath along with diffuse bodyaches that started 1 day prior to admission.  He did report some sinus drainage along with cough on and off fevers.  No other constitutional symptoms.     In the emergency room, patient was afebrile with a Tmax of 30 °C.  Otherwise hemodynamically stable.  Labs revealed anemia with a otherwise unremarkable CBC.  BMP showed a creatinine of 11.17 consistent with his end-stage renal disease.  LFTs were unremarkable.  proBNP was elevated at 40,000.  2 sets of blood cultures obtained, with 1 growing staph epi.  Repeat blood cultures were obtained on 1/19 and currently pending.  COVID-19 PCR, influenza A, influenza B and RSV by PCR were all negative.  Chest x-ray done at time of admission showed right lower lobe consolidation with small effusion.  CT brain did not show any acute abnormalities.  The patient was started initially on IV ceftriaxone and doxycycline for concerns of pneumonia, vancomycin was added on 1/19 after results of his blood cultures.     Microbiology data:        Hospital Encounter on 01/18/25   1. Blood Culture     Status: Abnormal (Preliminary result)     Collection Time: 01/18/25  2:13 PM     Specimen: Blood,peripheral   Result Value Ref Range     Blood Culture Result Positive N/A     Blood Culture Result Staphylococcus epidermidis (A) N/A     Blood Smear Positive Blood Culture (A) N/A     Blood Smear Gram positive cocci in pairs and clusters (A) N/A      Chest x-ray on 1/18/2025:  Right lower lobe pneumonia/small effusion     Impression:  Godwin Fonseca is a 44 year old male with      Staph epi sepsis/bacteremia with threat to life  This is in the setting of a dialysis line  Given the presence of the dialysis line,  will need to treat this as a real bacteremia.  However, we can salvage the line  Currently on IV vancomycin  Repeat cultures are currently pending  Right lower lobe pneumonia in the setting of a corona OC 43 infection  Currently on ceftriaxone and doxycycline  End-stage renal disease  On hemodialysis through dialysis line  Renal medicine following     Recommendations:      Discontinue ceftriaxone as his IV vancomycin would cover community-acquired organisms causing pneumonia  Continue IV vancomycin for now  Continue doxycycline for atypical coverage  Check TTE  Continue to follow-up on clearance cultures  Continue to monitor daily labs for antibiotic toxicity    MEDICATIONS ADMINISTERED IN LAST 1 DAY:  amphetamine-dextroamphetamine (Adderall) tab 10 mg       Date Action Dose Route User    1/21/2025 0505 Given 10 mg Oral OcaBebe RN          buPROPion ER (Wellbutrin XL) 24 hr tab 150 mg       Date Action Dose Route User    1/20/2025 0851 Given 150 mg Oral Pamela Espino RN          calcium acetate (Phoslo) cap 667 mg       Date Action Dose Route User    1/20/2025 0857 Given 667 mg Oral Pamela Espino RN          carvedilol (Coreg) tab 25 mg       Date Action Dose Route User    1/20/2025 1732 Given 25 mg Oral Pamela Espion RN          cloNIDine (Catapres) tab 0.1 mg       Date Action Dose Route User    1/20/2025 2220 Given 0.1 mg Oral OcaBebe RN          docusate sodium (Colace) cap 100 mg       Date Action Dose Route User    1/20/2025 2240 Given 100 mg Oral OcaBebe RN          doxycycline (Vibramycin) cap 100 mg       Date Action Dose Route User    1/20/2025 2220 Given 100 mg Oral OcaBebe RN    1/20/2025 0851 Given 100 mg Oral Pamela Espino RN          epoetin sylvia (Epogen, Procrit) 05002 UNIT/ML injection 10,000 Units       Date Action Dose Route User    1/20/2025 1230 Given 10,000 Units Intravenous Pamela Espino RN          FLUoxetine (PROzac) cap 40 mg       Date  Action Dose Route User    1/20/2025 0851 Given 40 mg Oral Pamela Espino RN          fluticasone propionate (Flonase) 50 MCG/ACT nasal suspension 1 spray       Date Action Dose Route User    1/20/2025 0858 Given 1 spray Each Nare Pamela Espino RN          gabapentin (Neurontin) cap 200 mg       Date Action Dose Route User    1/20/2025 2220 Given 200 mg Oral OcaBebe RN    1/20/2025 1610 Given 200 mg Oral Pamela Espino RN    1/20/2025 0851 Given 200 mg Oral Pamela Espino RN          heparin (Porcine) 5000 UNIT/ML injection 5,000 Units       Date Action Dose Route User    1/21/2025 0511 Given 5,000 Units Subcutaneous (Left Lower Abdomen) Bebe Mott RN    1/20/2025 2220 Given 5,000 Units Subcutaneous (Right Lower Abdomen) Bebe Mott RN          hydrALAZINE (Apresoline) tab 25 mg       Date Action Dose Route User    1/21/2025 0505 Given 25 mg Oral OcaBebe RN    1/20/2025 2220 Given 25 mg Oral OcaBebe RN    1/20/2025 1614 Given 25 mg Oral Pamela Espino RN          HYDROcodone-acetaminophen (Norco) 5-325 MG per tab 1 tablet       Date Action Dose Route User    1/21/2025 0504 Given 1 tablet Oral OcBebe barone RN    1/20/2025 2240 Given 1 tablet Oral OcaBebe RN          ipratropium-albuterol (Duoneb) 0.5-2.5 (3) MG/3ML inhalation solution 3 mL       Date Action Dose Route User    1/20/2025 1827 Given 3 mL Nebulization Anabel Weeks RCP          isosorbide mononitrate ER (Imdur) 24 hr tab 30 mg       Date Action Dose Route User    1/20/2025 1617 Given 30 mg Oral Pamela Espino RN          lamoTRIgine (LaMICtal) tab 100 mg       Date Action Dose Route User    1/20/2025 0857 Given 100 mg Oral Pamela Espino RN          memantine (Namenda) tab 5 mg       Date Action Dose Route User    1/20/2025 2221 Given 5 mg Oral Oca, Bebe RN          sevelamer carbonate (Renvela) tab 2,400 mg       Date Action Dose Route User    1/20/2025 1732 Given 2,400 mg Oral  Pamela Espino RN    1/20/2025 1230 Given 2,400 mg Oral Pamela Espino RN    1/20/2025 0851 Given 2,400 mg Oral Pamela Espino RN          tamsulosin (Flomax) cap 0.4 mg       Date Action Dose Route User    1/20/2025 0851 Given 0.4 mg Oral Pamela Espino RN          vancomycin (Vancocin) 1,000 mg in sodium chloride 0.9% 250 mL IVPB-ADDV       Date Action Dose Route User    1/20/2025 1746 New Bag 1,000 mg Intravenous Pamela Espino RN          ARIPiprazole (Abilify) tab 15 mg       Date Action Dose Route User    1/20/2025 0857 Given 15 mg Oral Pamela Espino RN            Vitals (last day)       Date/Time Temp Pulse Resp BP SpO2 Weight O2 Device O2 Flow Rate (L/min) [facility]    01/21/25 0553 97.8 °F (36.6 °C) 92 19 115/94 95 % 224 lb 3.2 oz (101.7 kg) Nasal cannula 2 L/min     01/21/25 0212 -- -- -- -- 83 % -- Nasal cannula 2 L/min СВЕТЛАНА    01/21/25 0211 -- -- -- -- 76 % -- -- -- СВЕТЛАНА    01/21/25 0210 -- -- -- -- 85 % -- -- -- СВЕТЛАНА    01/21/25 0209 -- -- -- -- 86 % -- -- -- СВЕТЛАНА    01/21/25 0208 -- -- -- -- 87 % -- -- -- СВЕТЛАНА    01/20/25 2338 98.4 °F (36.9 °C) 91 19 119/74 90 % -- None (Room air) 0 L/min     01/20/25 1935 97.9 °F (36.6 °C) 96 19 -- 95 % -- None (Room air) 0 L/min     01/20/25 1935 -- -- -- 130/96 -- -- -- -- AA    01/20/25 1850 -- -- -- 129/98 -- -- -- -- AA    01/20/25 1608 97.8 °F (36.6 °C) 95 18 159/91 98 % -- None (Room air) 0 L/min     01/20/25 1115 98.2 °F (36.8 °C) 93 18 174/98 100 % -- None (Room air) 0 L/min     01/20/25 0714 97.9 °F (36.6 °C) 89 20 118/78 94 % -- None (Room air) 0 L/min     01/20/25 0500 97.5 °F (36.4 °C) 92 18 139/97 95 % 248 lb 0.3 oz (112.5 kg) None (Room air) -- AB       Blood Transfusion Record       Product Unit Status Volume Start End            Transfuse RBC       24  [id number]  [id number] Stopped 350 mL 01/18/25 2206 01/19/25 0100

## 2025-01-21 NOTE — PROGRESS NOTES
[facility]   part of [facility]    Nephrology Progress Note    Godwin Fonseca Attending:  Mathew Elizabeth DO       SUBJECTIVE:     Denies rectal bleeding.  Tolerated HD yesterday without issue per patient.    PHYSICAL EXAM:     Vital Signs: /65 (BP Location: Right arm)   Pulse 94   Temp 97.8 °F (36.6 °C) (Oral)   Resp 18   Ht 188 cm (6' 2\")   Wt 224 lb 3.2 oz (101.7 kg)   SpO2 91%   BMI 28.79 kg/m²   Temp (24hrs), Av.9 °F (36.6 °C), Min:97.8 °F (36.6 °C), Max:98.4 °F (36.9 °C)       Intake/Output Summary (Last 24 hours) at 2025 1631  Last data filed at 2025 1436  Gross per 24 hour   Intake 1368 ml   Output 0 ml   Net 1368 ml     Wt Readings from Last 3 Encounters:   25 224 lb 3.2 oz (101.7 kg)   25 244 lb 11.4 oz (111 kg)   25 245 lb (111.1 kg)     General: pleasant, well appearing  Skin: no visible rashes  HEENT: NCAT  Cardiac: Regular rate and rhythm, no murmur/gallop or rub  Lungs: CTAB, no wheeze, no rale, no rhonchi  Abdomen: Soft, NTND  Extremities: warm, well perfused, no leg edema  Neurologic/Psych: mentating well, no asterixis  LUE AVF + thrill and bruit  RIJ CVC       LABORATORY DATA:     Lab Results   Component Value Date     (H) 2025    BUN 31 (H) 2025    BUNCREA 13.0 2022    CREATSERUM 6.65 (H) 2025    ANIONGAP 14 2025    GFR 59 (L) 2018    GFRNAA 30 (L) 2022    GFRAA 35 (L) 2022    CA 8.9 2025    OSMOCALC 297 (H) 2025    ALKPHO 76 2025    AST 37 (H) 2025    ALT 30 2025    BILT 0.4 2025    TP 6.9 2025    ALB 4.1 2025    GLOBULIN 2.7 2025     2025    K 4.0 2025     2025    CO2 23.0 2025     Lab Results   Component Value Date    WBC 6.9 2025    RBC 3.28 (L) 2025    HGB 10.4 (L) 2025    HCT 31.1 (L) 2025    .0 2025    MPV 11.5 2012    MCV 94.8 2025    MCH 31.7  01/21/2025    MCHC 33.4 01/21/2025    RDW 15.4 01/21/2025    NEPRELIM 7.32 01/18/2025    NEPERCENT 72.8 01/18/2025    LYPERCENT 11.1 01/18/2025    MOPERCENT 10.5 01/18/2025    EOPERCENT 4.4 01/18/2025    BAPERCENT 0.8 01/18/2025    NE 7.32 01/18/2025    LYMABS 1.12 01/18/2025    MOABSO 1.06 (H) 01/18/2025    EOABSO 0.44 01/18/2025    BAABSO 0.08 01/18/2025     Lab Results   Component Value Date    MALBP 167.00 05/24/2023    CREUR 142.00 05/24/2023    CREUR 142.00 05/24/2023     Lab Results   Component Value Date    COLORUR Light-Yellow 11/17/2024    CLARITY Clear 11/17/2024    SPECGRAVITY 1.013 11/17/2024    GLUUR Normal 11/17/2024    BILUR Negative 11/17/2024    KETUR Negative 11/17/2024    BLOODURINE Negative 11/17/2024    PHURINE 8.5 (H) 11/17/2024    PROUR 100 (A) 11/17/2024    UROBILINOGEN Normal 11/17/2024    NITRITE Negative 11/17/2024    LEUUR Negative 11/17/2024    WBCUR 1-5 11/17/2024    RBCUR 0-2 11/17/2024    EPIUR Few (A) 11/17/2024    BACUR Rare (A) 11/17/2024    CAOXUR Occasional (A) 04/20/2018    HYLUR Present (A) 05/14/2019         IMAGING:     Reviewed.    MEDICATIONS:       Current Facility-Administered Medications   Medication Dose Route Frequency    lidocaine-menthol 4-1 % patch 1 patch  1 patch Transdermal Daily    cyclobenzaprine (Flexeril) tab 5 mg  5 mg Oral TID PRN    ipratropium-albuterol (Duoneb) 0.5-2.5 (3) MG/3ML inhalation solution 3 mL  3 mL Nebulization Q4H PRN    heparin (Porcine) 5000 UNIT/ML injection 5,000 Units  5,000 Units Subcutaneous Q8H MARTHA    docusate sodium (Colace) cap 100 mg  100 mg Oral BID PRN    methocarbamol (Robaxin) tab 500 mg  500 mg Oral Once    carvedilol (Coreg) tab 25 mg  25 mg Oral BID with meals    NIFEdipine ER (Procardia-XL) 24 hr tab 30 mg  30 mg Oral Daily    labetalol (Trandate) 5 mg/mL injection 10 mg  10 mg Intravenous Q4H PRN    hydrALAzine (Apresoline) 20 mg/mL injection 10 mg  10 mg Intravenous Q4H PRN    losartan (Cozaar) tab 100 mg  100 mg Oral  Daily    isosorbide mononitrate ER (Imdur) 24 hr tab 30 mg  30 mg Oral Daily    fluticasone propionate (Flonase) 50 MCG/ACT nasal suspension 1 spray  1 spray Each Nare Daily    [Held by provider] fenofibrate micronized (Lofibra) cap 134 mg  134 mg Oral Nightly    amphetamine-dextroamphetamine (Adderall) tab 10 mg  10 mg Oral BID AC    Vancomycin: PHARMACY DOSING  1 each Intravenous See Admin Instructions (RX holding)    nitroglycerin (Nitrostat) SL tab 0.4 mg  0.4 mg Sublingual Q5 Min PRN    cloNIDine (Catapres) tab 0.1 mg  0.1 mg Oral BID    hydrALAZINE (Apresoline) tab 25 mg  25 mg Oral Q8H MARTHA    sodium chloride 0.9% infusion   Intravenous Once    doxycycline (Vibramycin) cap 100 mg  100 mg Oral BID    albuterol (Ventolin HFA) 108 (90 Base) MCG/ACT inhaler 2 puff  2 puff Inhalation Q6H PRN    ARIPiprazole (Abilify) tab 15 mg  15 mg Oral Daily    buPROPion ER (Wellbutrin XL) 24 hr tab 150 mg  150 mg Oral Daily    calcium acetate (Phoslo) cap 667 mg  667 mg Oral Daily    FLUoxetine (PROzac) cap 40 mg  40 mg Oral Daily    gabapentin (Neurontin) cap 200 mg  200 mg Oral TID    lamoTRIgine (LaMICtal) tab 100 mg  100 mg Oral Daily    memantine (Namenda) tab 5 mg  5 mg Oral BID    sevelamer carbonate (Renvela) tab 2,400 mg  2,400 mg Oral TID CC    tamsulosin (Flomax) cap 0.4 mg  0.4 mg Oral Daily    HYDROcodone-acetaminophen (Norco) 5-325 MG per tab 1 tablet  1 tablet Oral Q6H PRN    acetaminophen (Tylenol Extra Strength) tab 500 mg  500 mg Oral Q4H PRN       ASSESSMENT/PLAN:     43 yo M with history of ESRD on iHD MWF with LUE AVF and RIJ tunnelled HD catheter, HTN, severe MR s/p MVR x2, HFpEF, DVT/PE, TIA, MGUS, bipolar disorder, recurrent GI bleed, diffuse body pain, missed HD treatment.     ESRD:  -- HD MWF per schedule     Anemia:  -- s/p epo, will hold now since uptrended >10     MBD:  -- increased sevelamer to 2400 TID AC  -- ca acetate 667 tid ac     HTN:  -- pt takes coreg and losartan pre-HD  -- takes remaining  hypertensives pre-HD if systolic > 180 but should take clonidine consistently to avoid rebound     LUE AVF:  -- L arm precautions  -- outpatient vascular surgery follow up given malfunction     Staph epi bacteremia:  -- antibiotics per ID  -- ok to maintain CVC per notes      Thank you for allowing me to participate in the care of this patient. Please do not hesitate to call with questions or concerns.        Lenka Bond MD  [facility]  [address] [place] [id number]    1/21/2025  4:31 PM

## 2025-01-22 VITALS
BODY MASS INDEX: 31.69 KG/M2 | HEIGHT: 74 IN | HEART RATE: 90 BPM | TEMPERATURE: 98 F | OXYGEN SATURATION: 98 % | DIASTOLIC BLOOD PRESSURE: 95 MMHG | WEIGHT: 246.94 LBS | RESPIRATION RATE: 20 BRPM | SYSTOLIC BLOOD PRESSURE: 124 MMHG

## 2025-01-22 LAB
ALBUMIN SERPL-MCNC: 4 G/DL (ref 3.2–4.8)
ANION GAP SERPL CALC-SCNC: 13 MMOL/L (ref 0–18)
BUN BLD-MCNC: 54 MG/DL (ref 9–23)
CALCIUM BLD-MCNC: 8.7 MG/DL (ref 8.7–10.6)
CHLORIDE SERPL-SCNC: 103 MMOL/L (ref 98–112)
CO2 SERPL-SCNC: 23 MMOL/L (ref 21–32)
CREAT BLD-MCNC: 8.71 MG/DL
EGFRCR SERPLBLD CKD-EPI 2021: 7 ML/MIN/1.73M2 (ref 60–?)
ERYTHROCYTE [DISTWIDTH] IN BLOOD BY AUTOMATED COUNT: 15.3 %
GLUCOSE BLD-MCNC: 100 MG/DL (ref 70–99)
GLUCOSE BLD-MCNC: 151 MG/DL (ref 70–99)
GLUCOSE BLD-MCNC: 96 MG/DL (ref 70–99)
HCT VFR BLD AUTO: 30 %
HGB BLD-MCNC: 9.9 G/DL
MCH RBC QN AUTO: 31.4 PG (ref 26–34)
MCHC RBC AUTO-ENTMCNC: 33 G/DL (ref 31–37)
MCV RBC AUTO: 95.2 FL
OSMOLALITY SERPL CALC.SUM OF ELEC: 303 MOSM/KG (ref 275–295)
PHOSPHATE SERPL-MCNC: 7.5 MG/DL (ref 2.4–5.1)
PLATELET # BLD AUTO: 199 10(3)UL (ref 150–450)
PLATELETS.RETICULATED NFR BLD AUTO: 2.4 % (ref 0–7)
POTASSIUM SERPL-SCNC: 4.2 MMOL/L (ref 3.5–5.1)
RBC # BLD AUTO: 3.15 X10(6)UL
SODIUM SERPL-SCNC: 139 MMOL/L (ref 136–145)
WBC # BLD AUTO: 6.1 X10(3) UL (ref 4–11)

## 2025-01-22 PROCEDURE — 90935 HEMODIALYSIS ONE EVALUATION: CPT | Performed by: PEDIATRICS

## 2025-01-22 PROCEDURE — 85027 COMPLETE CBC AUTOMATED: CPT | Performed by: HOSPITALIST

## 2025-01-22 PROCEDURE — 82962 GLUCOSE BLOOD TEST: CPT

## 2025-01-22 PROCEDURE — 80069 RENAL FUNCTION PANEL: CPT | Performed by: INTERNAL MEDICINE

## 2025-01-22 RX ORDER — CALCIUM ACETATE 667 MG/1
667 CAPSULE ORAL
Status: DISCONTINUED | OUTPATIENT
Start: 2025-01-22 | End: 2025-01-22

## 2025-01-22 RX ORDER — VANCOMYCIN/0.9 % SOD CHLORIDE 1 G/100 ML
1 PLASTIC BAG, INJECTION (ML) INTRAVENOUS
Qty: 5000 ML | Refills: 0 | Status: ON HOLD | OUTPATIENT
Start: 2025-01-22 | End: 2025-02-02

## 2025-01-22 NOTE — CM/SW NOTE
01/22/25 1100   CM/SW Referral Data   Referral Source Social Work (self-referral)   Reason for Referral Discharge planning   Informant EMR;Clinical Staff Member;Patient   Readmission Assessment   Pt's living situation prior to admission? Home with family   Was the recommended discharge plan achieved? Yes   Was pt. discharged w/out services? No   Medical Hx   Does patient have an established PCP? Yes   Patient Info   Patient's Current Mental Status at Time of Assessment Alert;Oriented   Patient's Home Environment House   Patient lives with   (family)   Patient Status Prior to Admission   Services in place prior to admission Home Health Care;Dialysis   Home Health Provider Info [facility]   Dialysis Clinic [facility]   Scheduled Dialysis days M-W-F   Dialysis scheduled time 1045   Discharge Needs   Anticipated D/C needs Home health care  (IV abx with HD)     Patient is a 43 y/o male admitted due to chest pain. SW noted and acknowledged order for outpatient abx.    Per order, pt is to have IV abx administered 3 times a week for 11 days with HD.    SEFERINO met with pt at bedside to discuss DC plan. Pt reports he lives at home with family. Pt also confirmed he received HD at [facility] on MWF at 10:45am.    SEFERINO discussed the need for OP IV abx and that MD wants the abx administered with his HD. Pt verbalized understanding and is agreeable with it.    SEFERINO also notified pt that [facility] order was sent to [facility] , so pt will be able to resume services with them at [facility]. Pt again verbalized understanding.    Pt thanked SEFERINO for stopping by and denied having any further questions at this time.    SEFERINO called [facility] ([phone]) and spoke with Shlaini. SEFERINO notified Shalini that pt will need IV abx with his HD. Shalini requested for SEFERINO to fax over the script, cultures, and ID note to them.    SEFERINO faxed the requested documents to [facility] (F: [phone]).     to remain available for support and/or discharge  planning.    Addendum: SEFERINO alled [facility] ([phone]) again and inquired if they received the faxed documents. SEFERINO was notified that documents were received. Pt is set to resume HD on Friday with [facility] and will be receiving his IV abx with HD.    SEFERINO updated RN.    JOSH Walters  Discharge Planner  [phone]

## 2025-01-22 NOTE — PLAN OF CARE
Discharge Note    Patient discharged at 1800. Explained discharge instructions and follow ups to patient. Verbalizes understanding, IV removed, tele monitor discontinued. Patient transported via wheelchair to [facility].

## 2025-01-22 NOTE — PROGRESS NOTES
[facility]   part of [facility]    Nephrology Progress Note    Godwin Fonseca Attending:  Minna Costello DO       SUBJECTIVE:     No complaints today.    PHYSICAL EXAM:     Vital Signs: BP (!) 137/102 (BP Location: Right arm)   Pulse 86   Temp 98.4 °F (36.9 °C) (Oral)   Resp 20   Ht 188 cm (6' 2\")   Wt 246 lb 14.6 oz (112 kg)   SpO2 95%   BMI 31.70 kg/m²   Temp (24hrs), Av.2 °F (36.8 °C), Min:97.6 °F (36.4 °C), Max:98.7 °F (37.1 °C)       Intake/Output Summary (Last 24 hours) at 2025 1227  Last data filed at 2025 1002  Gross per 24 hour   Intake 1078 ml   Output --   Net 1078 ml     Wt Readings from Last 3 Encounters:   25 246 lb 14.6 oz (112 kg)   25 244 lb 11.4 oz (111 kg)   25 245 lb (111.1 kg)       General: pleasant, well appearing  Skin: no visible rashes  HEENT: NCAT  Cardiac: Regular rate and rhythm, no murmur/gallop or rub  Lungs: CTAB, no wheeze, no rale, no rhonchi  Abdomen: Soft, NTND  Extremities: warm, well perfused, no leg edema  Neurologic/Psych: mentating well, no asterixis  LUE AVF + thrill and bruit  RIJ CVC    LABORATORY DATA:     Lab Results   Component Value Date    GLU 96 2025    BUN 54 (H) 2025    BUNCREA 13.0 2022    CREATSERUM 8.71 (H) 2025    ANIONGAP 13 2025    GFR 59 (L) 2018    GFRNAA 30 (L) 2022    GFRAA 35 (L) 2022    CA 8.7 2025    OSMOCALC 303 (H) 2025    ALKPHO 76 2025    AST 37 (H) 2025    ALT 30 2025    BILT 0.4 2025    TP 6.9 2025    ALB 4.0 2025    GLOBULIN 2.7 2025     2025    K 4.2 2025     2025    CO2 23.0 2025     Lab Results   Component Value Date    WBC 6.1 2025    RBC 3.15 (L) 2025    HGB 9.9 (L) 2025    HCT 30.0 (L) 2025    .0 2025    MPV 11.5 2012    MCV 95.2 2025    MCH 31.4 2025    MCHC 33.0 2025    RDW 15.3 2025     NEPRELIM 7.32 01/18/2025    NEPERCENT 72.8 01/18/2025    LYPERCENT 11.1 01/18/2025    MOPERCENT 10.5 01/18/2025    EOPERCENT 4.4 01/18/2025    BAPERCENT 0.8 01/18/2025    NE 7.32 01/18/2025    LYMABS 1.12 01/18/2025    MOABSO 1.06 (H) 01/18/2025    EOABSO 0.44 01/18/2025    BAABSO 0.08 01/18/2025     Lab Results   Component Value Date    MALBP 167.00 05/24/2023    CREUR 142.00 05/24/2023    CREUR 142.00 05/24/2023     Lab Results   Component Value Date    COLORUR Light-Yellow 11/17/2024    CLARITY Clear 11/17/2024    SPECGRAVITY 1.013 11/17/2024    GLUUR Normal 11/17/2024    BILUR Negative 11/17/2024    KETUR Negative 11/17/2024    BLOODURINE Negative 11/17/2024    PHURINE 8.5 (H) 11/17/2024    PROUR 100 (A) 11/17/2024    UROBILINOGEN Normal 11/17/2024    NITRITE Negative 11/17/2024    LEUUR Negative 11/17/2024    WBCUR 1-5 11/17/2024    RBCUR 0-2 11/17/2024    EPIUR Few (A) 11/17/2024    BACUR Rare (A) 11/17/2024    CAOXUR Occasional (A) 04/20/2018    HYLUR Present (A) 05/14/2019         IMAGING:     Reviewed.    MEDICATIONS:       Current Facility-Administered Medications   Medication Dose Route Frequency    calcium acetate (Phoslo) cap 667 mg  667 mg Oral TID CC    epoetin sylvia (Epogen, Procrit) 5,000 Units injection  5,000 Units Intravenous Once    vancomycin (Vancocin) 1,000 mg in sodium chloride 0.9% 250 mL IVPB-ADDV  10 mg/kg Intravenous Once    lidocaine-menthol 4-1 % patch 1 patch  1 patch Transdermal Daily    cyclobenzaprine (Flexeril) tab 5 mg  5 mg Oral TID PRN    sodium chloride 0.9 % IV bolus 100 mL  100 mL Intravenous Q30 Min PRN    And    albumin human (Albumin) 25% injection 25 g  25 g Intravenous PRN Dialysis    ipratropium-albuterol (Duoneb) 0.5-2.5 (3) MG/3ML inhalation solution 3 mL  3 mL Nebulization Q4H PRN    heparin (Porcine) 5000 UNIT/ML injection 5,000 Units  5,000 Units Subcutaneous Q8H MARTHA    docusate sodium (Colace) cap 100 mg  100 mg Oral BID PRN    methocarbamol (Robaxin) tab 500 mg   500 mg Oral Once    carvedilol (Coreg) tab 25 mg  25 mg Oral BID with meals    NIFEdipine ER (Procardia-XL) 24 hr tab 30 mg  30 mg Oral Daily    labetalol (Trandate) 5 mg/mL injection 10 mg  10 mg Intravenous Q4H PRN    hydrALAzine (Apresoline) 20 mg/mL injection 10 mg  10 mg Intravenous Q4H PRN    losartan (Cozaar) tab 100 mg  100 mg Oral Daily    isosorbide mononitrate ER (Imdur) 24 hr tab 30 mg  30 mg Oral Daily    fluticasone propionate (Flonase) 50 MCG/ACT nasal suspension 1 spray  1 spray Each Nare Daily    [Held by provider] fenofibrate micronized (Lofibra) cap 134 mg  134 mg Oral Nightly    amphetamine-dextroamphetamine (Adderall) tab 10 mg  10 mg Oral BID AC    Vancomycin: PHARMACY DOSING  1 each Intravenous See Admin Instructions (RX holding)    nitroglycerin (Nitrostat) SL tab 0.4 mg  0.4 mg Sublingual Q5 Min PRN    cloNIDine (Catapres) tab 0.1 mg  0.1 mg Oral BID    hydrALAZINE (Apresoline) tab 25 mg  25 mg Oral Q8H MARTHA    sodium chloride 0.9% infusion   Intravenous Once    doxycycline (Vibramycin) cap 100 mg  100 mg Oral BID    albuterol (Ventolin HFA) 108 (90 Base) MCG/ACT inhaler 2 puff  2 puff Inhalation Q6H PRN    ARIPiprazole (Abilify) tab 15 mg  15 mg Oral Daily    buPROPion ER (Wellbutrin XL) 24 hr tab 150 mg  150 mg Oral Daily    FLUoxetine (PROzac) cap 40 mg  40 mg Oral Daily    gabapentin (Neurontin) cap 200 mg  200 mg Oral TID    lamoTRIgine (LaMICtal) tab 100 mg  100 mg Oral Daily    memantine (Namenda) tab 5 mg  5 mg Oral BID    sevelamer carbonate (Renvela) tab 2,400 mg  2,400 mg Oral TID CC    tamsulosin (Flomax) cap 0.4 mg  0.4 mg Oral Daily    HYDROcodone-acetaminophen (Norco) 5-325 MG per tab 1 tablet  1 tablet Oral Q6H PRN    acetaminophen (Tylenol Extra Strength) tab 500 mg  500 mg Oral Q4H PRN       ASSESSMENT/PLAN:     45 yo M with history of ESRD on iHD MWF with LUE AVF and RIJ tunnelled HD catheter, HTN, severe MR s/p MVR x2, HFpEF, DVT/PE, TIA, MGUS, bipolar disorder, recurrent  GI bleed, diffuse body pain, missed HD treatment.     ESRD:  -- HD MWF per schedule     Anemia:  -- s/p epo, redose 5000 u today with Hb 9.9     MBD:  -- increased sevelamer to 2400 TID AC  -- ca acetate 667 tid ac  -- reiterated low phos diet    HTN:  -- pt takes coreg and losartan pre-HD  -- takes remaining hypertensives pre-HD if systolic > 180 but should take clonidine consistently to avoid rebound     LUE AVF:  -- L arm precautions  -- outpatient vascular surgery follow up given malfunction     Staph epi bacteremia:  -- antibiotics per ID  -- ok to maintain CVC per notes     Ok to discharge from renal perspective after HD if his outpatient HD chair and antibiotics are set up.  Discussed with ID and RN.      Thank you for allowing me to participate in the care of this patient. Please do not hesitate to call with questions or concerns.        Lenka Bond MD  [facility]

## 2025-01-22 NOTE — PLAN OF CARE
Assumed pt care at 0730. A&O x 4. On room air. Reports chronic neck pain. Vital signs stable, NSR on tele. Up independently.     Plan of care: HD, IV vanco, DC planning.    Plan of care updated with patient. Questions answered, pt verbalized understanding. Call light is within reach. All needs met at this time.    1320: RN updated by SW that IV sukhdevo is setup for outpatient HD. Next scheduled dialysis is Friday.    Problem: Patient/Family Goals  Goal: Patient/Family Long Term Goal  Description: Patient's Long Term Goal: to go home    Interventions:  - increase ambulation  - See additional Care Plan goals for specific interventions  Outcome: Progressing  Goal: Patient/Family Short Term Goal  Description: Patient's Short Term Goal: increase mobility    Interventions:   - encourage ambulation  - See additional Care Plan goals for specific interventions  Outcome: Progressing     Problem: PAIN - ADULT  Goal: Verbalizes/displays adequate comfort level or patient's stated pain goal  Description: INTERVENTIONS:  - Encourage pt to monitor pain and request assistance  - Assess pain using appropriate pain scale  - Administer analgesics based on type and severity of pain and evaluate response  - Implement non-pharmacological measures as appropriate and evaluate response  - Consider cultural and social influences on pain and pain management  - Manage/alleviate anxiety  - Utilize distraction and/or relaxation techniques  - Monitor for opioid side effects  - Notify MD/LIP if interventions unsuccessful or patient reports new pain  - Anticipate increased pain with activity and pre-medicate as appropriate  Outcome: Progressing     Problem: RESPIRATORY - ADULT  Goal: Achieves optimal ventilation and oxygenation  Description: INTERVENTIONS:  - Assess for changes in respiratory status  - Assess for changes in mentation and behavior  - Position to facilitate oxygenation and minimize respiratory effort  - Oxygen supplementation based on  oxygen saturation or ABGs  - Provide Smoking Cessation handout, if applicable  - Encourage broncho-pulmonary hygiene including cough, deep breathe, Incentive Spirometry  - Assess the need for suctioning and perform as needed  - Assess and instruct to report SOB or any respiratory difficulty  - Respiratory Therapy support as indicated  - Manage/alleviate anxiety  - Monitor for signs/symptoms of CO2 retention  Outcome: Progressing     Problem: CARDIOVASCULAR - ADULT  Goal: Maintains optimal cardiac output and hemodynamic stability  Description: INTERVENTIONS:  - Monitor vital signs, rhythm, and trends  - Monitor for bleeding, hypotension and signs of decreased cardiac output  - Evaluate effectiveness of vasoactive medications to optimize hemodynamic stability  - Monitor arterial and/or venous puncture sites for bleeding and/or hematoma  - Assess quality of pulses, skin color and temperature  - Assess for signs of decreased coronary artery perfusion - ex. Angina  - Evaluate fluid balance, assess for edema, trend weights  Outcome: Progressing  Goal: Absence of cardiac arrhythmias or at baseline  Description: INTERVENTIONS:  - Continuous cardiac monitoring, monitor vital signs, obtain 12 lead EKG if indicated  - Evaluate effectiveness of antiarrhythmic and heart rate control medications as ordered  - Initiate emergency measures for life threatening arrhythmias  - Monitor electrolytes and administer replacement therapy as ordered  Outcome: Progressing

## 2025-01-22 NOTE — PLAN OF CARE
Assumed patient at 1930. Alert and oriented x 4 on room air. Lung sounds diminished bilaterally. Normal sinus on tele. Continent of bowel and bladder. Up standby assist. Patient updated on plan of care and verbalized understanding.     POC: HD tomorrow  IV vanco and po doxycycline  DW/IO    Problem: Patient/Family Goals  Goal: Patient/Family Long Term Goal  Description: Patient's Long Term Goal: to go home    Interventions:  - increase ambulation  - See additional Care Plan goals for specific interventions  Outcome: Progressing  Goal: Patient/Family Short Term Goal  Description: Patient's Short Term Goal: increase mobility    Interventions:   - encourage ambulation  - See additional Care Plan goals for specific interventions  Outcome: Progressing     Problem: PAIN - ADULT  Goal: Verbalizes/displays adequate comfort level or patient's stated pain goal  Description: INTERVENTIONS:  - Encourage pt to monitor pain and request assistance  - Assess pain using appropriate pain scale  - Administer analgesics based on type and severity of pain and evaluate response  - Implement non-pharmacological measures as appropriate and evaluate response  - Consider cultural and social influences on pain and pain management  - Manage/alleviate anxiety  - Utilize distraction and/or relaxation techniques  - Monitor for opioid side effects  - Notify MD/LIP if interventions unsuccessful or patient reports new pain  - Anticipate increased pain with activity and pre-medicate as appropriate  Outcome: Progressing     Problem: RESPIRATORY - ADULT  Goal: Achieves optimal ventilation and oxygenation  Description: INTERVENTIONS:  - Assess for changes in respiratory status  - Assess for changes in mentation and behavior  - Position to facilitate oxygenation and minimize respiratory effort  - Oxygen supplementation based on oxygen saturation or ABGs  - Provide Smoking Cessation handout, if applicable  - Encourage broncho-pulmonary hygiene including  cough, deep breathe, Incentive Spirometry  - Assess the need for suctioning and perform as needed  - Assess and instruct to report SOB or any respiratory difficulty  - Respiratory Therapy support as indicated  - Manage/alleviate anxiety  - Monitor for signs/symptoms of CO2 retention  Outcome: Progressing     Problem: CARDIOVASCULAR - ADULT  Goal: Maintains optimal cardiac output and hemodynamic stability  Description: INTERVENTIONS:  - Monitor vital signs, rhythm, and trends  - Monitor for bleeding, hypotension and signs of decreased cardiac output  - Evaluate effectiveness of vasoactive medications to optimize hemodynamic stability  - Monitor arterial and/or venous puncture sites for bleeding and/or hematoma  - Assess quality of pulses, skin color and temperature  - Assess for signs of decreased coronary artery perfusion - ex. Angina  - Evaluate fluid balance, assess for edema, trend weights  Outcome: Progressing  Goal: Absence of cardiac arrhythmias or at baseline  Description: INTERVENTIONS:  - Continuous cardiac monitoring, monitor vital signs, obtain 12 lead EKG if indicated  - Evaluate effectiveness of antiarrhythmic and heart rate control medications as ordered  - Initiate emergency measures for life threatening arrhythmias  - Monitor electrolytes and administer replacement therapy as ordered  Outcome: Progressing

## 2025-01-22 NOTE — PROGRESS NOTES
Infectious Disease Progress Note      Date of admission: 1/18/2025  9:46 AM     Reason for consult: Staph epi bacteremia    Referring physician: Minna Costello DO    Subjective: The patient is feeling well.  Continues to have upper respiratory symptoms; however, those are improving.  No nausea or vomiting.  No diarrhea.  No shortness of breath.    The rest of the systems were reviewed and found to be negative except was mentioned above    Interval events: This is a 44-year-old male patient with history of CAD, end-stage renal disease on hemodialysis through dialysis line, presenting here with staph epi bacteremia, also found to have corona OC 43 infection.  There was a question of a secondary bacterial pneumonia.    Medications:    calcium acetate    epoetin sylvia    vancomycin    lidocaine-menthol    cyclobenzaprine    sodium chloride **AND** albumin human    ipratropium-albuterol    heparin    docusate sodium    methocarbamol    carvedilol    NIFEdipine ER    labetalol    hydrALAzine    losartan    isosorbide mononitrate ER    fluticasone propionate    [Held by provider] fenofibrate micronized    amphetamine-dextroamphetamine    Vancomycin IV    nitroglycerin    cloNIDine    hydrALAZINE    sodium chloride    doxycycline    albuterol    ARIPiprazole    buPROPion ER    FLUoxetine HCl    gabapentin    lamoTRIgine    memantine    sevelamer carbonate    tamsulosin    HYDROcodone-acetaminophen    acetaminophen     Allergies:  Allergies[1]    Physical Exam:  Vitals:    01/22/25 1002   BP:    Pulse: 92   Resp:    Temp:      Vitals signs and nursing note reviewed.   Constitutional:       Appearance: Normal appearance.   HENT:      Head: Normocephalic and atraumatic.      Mouth: Mucous membranes are moist.   Neck:      Musculoskeletal: Neck supple.   Cardiovascular:      Rate and Rhythm: Normal rate.      Heart sounds: Normal heart sounds. No murmur. No friction rub. No gallop.    Pulmonary:      Effort: Pulmonary effort  is normal. No respiratory distress.      Breath sounds: Normal breath sounds. No stridor. No wheezing, rhonchi or rales.   Chest:      Chest wall: No tenderness.  Dialysis line noted on the right side without any signs of infection  Abdominal:      General: Abdomen is flat. There is no distension.      Palpations: Abdomen is soft. There is no mass.      Tenderness: There is no tenderness. There is no guarding or rebound.      Hernia: No hernia is present.   Musculoskeletal:      Right lower leg: No edema.      Left lower leg: No edema.   Skin:     General: Skin is warm and dry.   Neurological:      General: No focal deficit present.      Mental Status: Alert and oriented to person, place, and time.       Laboratory data:  I have reviewed all the lab results independently.  Lab Results   Component Value Date    WBC 6.1 01/22/2025    HGB 9.9 01/22/2025    HCT 30.0 01/22/2025    .0 01/22/2025    CREATSERUM 8.71 01/22/2025    BUN 54 01/22/2025     01/22/2025    K 4.2 01/22/2025     01/22/2025    CO2 23.0 01/22/2025    GLU 96 01/22/2025    CA 8.7 01/22/2025    ALB 4.0 01/22/2025    PHOS 7.5 01/22/2025      Recent Labs   Lab 01/18/25  0957 01/18/25  1405 01/22/25  0615   RBC 2.62*   < > 3.15*   HGB 8.5*   < > 9.9*   HCT 25.3*   < > 30.0*   MCV 96.6   < > 95.2   MCH 32.4   < > 31.4   MCHC 33.6   < > 33.0   RDW 15.4   < > 15.3   NEPRELIM 7.32  --   --    WBC 10.1   < > 6.1   .0   < > 199.0    < > = values in this interval not displayed.      Microbiology data:  Hospital Encounter on 01/18/25   1. Blood Culture     Status: None (Preliminary result)    Collection Time: 01/19/25  6:21 PM    Specimen: Blood,peripheral   Result Value Ref Range    Blood Culture Result No Growth 2 Days N/A      Impression:  Godwin Fonseca is a 44 year old male with    Staph epi bacteremia with threat to life, with concerns for evolving sepsis  This is in the presence of a dialysis line  Currently on IV vancomycin  TTE  without endocarditis  Clearance cultures on 1/19/2025 without growth to date  Right lower lobe pneumonia in the setting of corona OC 43 infection  Currently on doxycycline and vancomycin  Clinically improving  End-stage renal disease  Hemodialysis through dialysis line  Renal medicine following    Recommendations:    Continue IV vancomycin, HD protocol to finish a total of 2 weeks of therapy through 2/1/2025  Weekly CBC with differential and CMP and vancomycin trough, sed rate and CRP.  Fax results to [facility]. Fax: [phone]. Tel: [phone].  PICC line care as per protocol.  Follow-up with infectious disease in 2 weeks after discharge  As for the doxycycline, the patient will need any further dosages once discharged.  Okay to discharge from ID perspective once the above is arranged  Continue to monitor daily labs for antibiotic toxicities  Further recommendations will depend on the above work-up and clinical progress     The plan of care was discussed with the primary hospital team, Minna Costello DO     Recommendations were also discussed with the patient; all questions were answered.     Thank you for this consultation. Please don't hesitate to call the ID team for questions or any acute changes in patient's clinical condition.    Please note that this report has been produced using speech recognition software and may contain errors related to that system including, but not limited to, errors in grammar, punctuation, and spelling, as well as words and phrases that possibly may have been recognized inappropriately.  If there are any questions or concerns, contact the dictating provider for clarification.    The 21st Century Cures Act makes medical notes like these available to patients in the interest of transparency. Please be advised this is a medical document. Medical documents are intended to carry relevant information, facts as evident, and the clinical opinion of the practitioner. The  medical note is intended as peer to peer communication and may appear blunt or direct. It is written in medical language and may contain abbreviations or verbiage that are unfamiliar.     Selam Jesus MD  [facility]. Tel: [phone]. Fax: [phone].     Godwin Fonseca : 1980 MRN: [id number] CSN: [id number]         [1]   Allergies  Allergen Reactions    Hydrochlorothiazide RASH and HIVES

## 2025-01-23 NOTE — PAYOR COMM NOTE
Discharge Notification    Patient Name: CHU VACA  Payor: [employer]  Subscriber #: [id number]  Authorization Number: [id number]  Admit Date/Time: 1/18/2025 9:46 AM  Discharge Date/Time: 1/22/2025 6:02 PM

## 2025-01-23 NOTE — PAYOR COMM NOTE
--------------  CONTINUED STAY REVIEW    Payor: [employer]  Subscriber #:  [id number]  Authorization Number: [id number]    Admit date: 1/18/25  Admit time:  3:56 PM    REVIEW DOCUMENTATION: 1/21  CC: follow-up hospital admission pain, weakness     SUBJECTIVE:  Interval History:       Sleeping when walked in room  When awaken states he feels terrible     Having severe neck pain  States he has chronic neck pain and was told he may need surgery     OBJECTIVE:  Scheduled Meds:   Scheduled Medications    lidocaine-menthol  1 patch Transdermal Daily    heparin  5,000 Units Subcutaneous Q8H MARTHA    methocarbamol  500 mg Oral Once    carvedilol  25 mg Oral BID with meals    NIFEdipine ER  30 mg Oral Daily    losartan  100 mg Oral Daily    isosorbide mononitrate ER  30 mg Oral Daily    fluticasone propionate  1 spray Each Nare Daily    [Held by provider] fenofibrate micronized  134 mg Oral Nightly    amphetamine-dextroamphetamine  10 mg Oral BID AC    Vancomycin IV  1 each Intravenous See Admin Instructions (RX holding)    cloNIDine  0.1 mg Oral BID    hydrALAZINE  25 mg Oral Q8H MARTHA    sodium chloride   Intravenous Once    doxycycline  100 mg Oral BID    ARIPiprazole  15 mg Oral Daily    buPROPion ER  150 mg Oral Daily    calcium acetate  667 mg Oral Daily    FLUoxetine HCl  40 mg Oral Daily    gabapentin  200 mg Oral TID    lamoTRIgine  100 mg Oral Daily    memantine  5 mg Oral BID    sevelamer carbonate  2,400 mg Oral TID CC    tamsulosin  0.4 mg Oral Daily         Continuous Infusions:   Medication Infusions        PRN Meds:   PRN Medications     cyclobenzaprine    ipratropium-albuterol    docusate sodium    labetalol    hydrALAzine    nitroglycerin    albuterol    HYDROcodone-acetaminophen    acetaminophen        PHYSICAL EXAM  Vital signs: Temp:  [97.8 °F (36.6 °C)-98.4 °F (36.9 °C)] 97.8 °F (36.6 °C)  Pulse:  [82-96] 82  Resp:  [18-19] 18  BP: (109-159)/(69-98) 109/69  SpO2:  [76 %-98 %] 97 %         GENERAL - AAO  EYES- sclera anicteric,    HENT- normocephalic,   NECK - no JVD  CV- RRR  RESP - ctab anteriorly, normal resp effort  ABDOMEN- soft, NT/ND   EXT- no LE edema   No shoulder ttp / no pain with passive ROM        Data Review:   Labs:           Recent Labs   Lab 01/18/25  0957 01/18/25  1405 01/19/25  0623 01/20/25  0641 01/21/25  0740   WBC 10.1  --  10.0 6.5 6.9   HGB 8.5* 8.0* 9.9* 9.2* 10.4*   MCV 96.6  --  95.8 98.6 94.8   .0  --  206.0 181.0 205.0                Recent Labs   Lab 01/18/25  0957 01/19/25  0623 01/20/25  0641 01/21/25  0740    138 140  140 140   K 3.9 3.6 3.9  3.9 4.0    102 104  104 103   CO2 21.0 24.0 23.0  23.0 23.0   BUN 78* 32* 47*  47* 31*   CREATSERUM 11.17* 6.79* 8.41*  8.41* 6.65*   CA 8.3* 8.6* 7.9*  7.9* 8.9   MG  --  1.8 1.6  --    PHOS  --   --  7.0* 6.6*   * 105* 138*  138* 100*               Recent Labs   Lab 01/18/25  0957 01/20/25  0641 01/21/25  0740   ALT 30  --   --    AST 37*  --   --    ALB 4.2 3.9 4.1                 Recent Labs   Lab 01/20/25  0458 01/20/25  1130 01/20/25  2207 01/21/25  0441 01/21/25  1242   PGLU 126* 110* 140* 103* 98               ASSESSMENT/PLAN:     Patient is a 44 year old male with PMH sig for CAD, ESRD on HD, HTN, chf, anemia, here for sob, diffuse body aches     Impression     -abnormal EKG - CARYN in V3, not meeting criteria for STEMI  -elevated troponin - chronic   -HTN  -non obstructive CAD on angiogram 2024  -sp MV repair 1994 and 2022  -chronic d CHF, volume management with HD     -anemia, acute on chronic   -ESRD on HD  -bipolar do        Plan     *ID / Pulm  -pt with URI like symptoms. Fevers overnight the first day. Not septic. No further fevers.   Rapid covid /flu neg, extended panel positive for coronavirus (non covid)  -CXR with RLL consolidation. Possible superimposed bacterial pna as well. Started on abx - rocephin / doxy  -check urine legionella / strep / sputum cx if able  - blood cx  with 1 of 2 with staph epi - vanco added. Has central line. Repeat blood cx ngtd.  ID following     *CV  -stat bedside echo overall ok  -trend trop / EKG - decreasing   -if worsening symptoms and uptrending trop / changes in EKG may need angiogram  -sp aspirin in ER, cont  -cont rest of cardiac meds     *GI  -hgb lower than recently  -trend hgb  -dropped to 8g  -recent admission for hematochezia, had neg tagged bleeding scan. Was also at [facility] - saw GI, though to have hemorrhoidal bleeding  -check iron stores  - iron deficient   -given 1u with HD on admission, hgb stable  -GI saw     *renal  -dw renal, cont HD per renal     *chronic conditions   -home meds as able     -having neck pain - add flexeril, appear more msk but if persist may need repeat imaging.       Scds        Will continue to follow while hospitalized. Please page me or the on-call hospitalist with questions or concerns.     Mathew Brown Hospitalist           MEDICATIONS ADMINISTERED IN LAST 1 DAY:  amphetamine-dextroamphetamine (Adderall) tab 10 mg       Date Action Dose Route User    Discharged on 1/22/2025 1/22/2025 1611 Given 10 mg Oral Sruthi Eaton RN          calcium acetate (Phoslo) cap 667 mg       Date Action Dose Route User    Discharged on 1/22/2025 1/22/2025 1611 Given 667 mg Oral Sruthi Eaton RN          carvedilol (Coreg) tab 25 mg       Date Action Dose Route User    Discharged on 1/22/2025 1/22/2025 1722 Given 25 mg Oral Sruthi Eaton, JASON          epoetin sylvia (Epogen, Procrit) 5,000 Units injection       Date Action Dose Route User    Discharged on 1/22/2025 1/22/2025 1610 Given by Other 5,000 Units Intravenous Sruthi Eaton RN          gabapentin (Neurontin) cap 200 mg       Date Action Dose Route User    Discharged on 1/22/2025 1/22/2025 1611 Given 200 mg Oral Sruthi Eaton, RN          heparin (Porcine) 5000 UNIT/ML injection 5,000 Units       Date  Action Dose Route User    Discharged on 1/22/2025 1/22/2025 1610 Given 5,000 Units Subcutaneous (Left Lower Abdomen) Sruthi Eaton RN          hydrALAZINE (Apresoline) tab 25 mg       Date Action Dose Route User    Discharged on 1/22/2025 1/22/2025 1610 Given 25 mg Oral Sruthi Eaton RN          HYDROcodone-acetaminophen (Norco) 5-325 MG per tab 1 tablet       Date Action Dose Route User    Discharged on 1/22/2025 1/22/2025 1318 Given 1 tablet Oral Sruthi Eaton RN          sevelamer carbonate (Renvela) tab 2,400 mg       Date Action Dose Route User    Discharged on 1/22/2025 1/22/2025 1611 Given 2,400 mg Oral Sruthi Eaton RN    1/22/2025 1318 Given 2,400 mg Oral Sruthi Eaton RN          vancomycin (Vancocin) 1,000 mg in sodium chloride 0.9% 250 mL IVPB-ADDV       Date Action Dose Route User    Discharged on 1/22/2025 1/22/2025 1612 New Bag 1,000 mg Intravenous Sruthi Eaton RN            Vitals (last day) before discharge       Date/Time Temp Pulse Resp BP SpO2 Weight O2 Device O2 Flow Rate (L/min) Who    01/22/25 1723 -- 90 -- 124/95 -- -- -- -- AR    01/22/25 1544 98.4 °F (36.9 °C) 85 20 98/68 98 % -- -- -- NS    01/22/25 1109 98.4 °F (36.9 °C) 86 20 137/102 -- -- -- -- NS    01/22/25 1002 -- 92 -- -- -- 246 lb 14.6 oz (112 kg) -- -- NS    01/22/25 0754 98.4 °F (36.9 °C) 88 20 126/91 95 % -- None (Room air) 0 L/min NS    01/22/25 0611 98.7 °F (37.1 °C) 83 17 137/77 100 % -- None (Room air) 0 L/min AO    01/21/25 2359 98.5 °F (36.9 °C) 90 17 109/71 96 % -- None (Room air) 0 L/min AO    01/21/25 1946 98 °F (36.7 °C) 87 17 101/63 92 % -- None (Room air) 0 L/min AO    01/21/25 1756 -- 94 -- -- 96 % -- -- -- NG    01/21/25 1636 97.6 °F (36.4 °C) 67 16 112/78 93 % -- None (Room air) -- AR    01/21/25 1441 -- 94 -- 113/65 -- -- -- -- AR    01/21/25 1436 -- 98 -- -- 91 % -- -- -- NG    01/21/25 1234 97.8 °F (36.6 °C) 82 18 109/69 97 % -- None (Room air) 0  L/min NG    01/21/25 1041 -- 89 -- 110/71 93 % -- None (Room air) 0 L/min AR    01/21/25 0846 97.8 °F (36.6 °C) 88 18 140/92 96 % -- Nasal cannula 1 L/min EL    01/21/25 0844 -- 90 -- 158/84 96 % -- -- -- EL    01/21/25 0553 97.8 °F (36.6 °C) 92 19 115/94 95 % 224 lb 3.2 oz (101.7 kg) Nasal cannula 2 L/min AO    01/21/25 0212 -- -- -- -- 83 % -- Nasal cannula 2 L/min ВСЕТЛАНА    01/21/25 0211 -- -- -- -- 76 % -- -- -- СВЕТЛАНА    01/21/25 0210 -- -- -- -- 85 % -- -- -- СВЕТЛАНА    01/21/25 0209 -- -- -- -- 86 % -- -- -- СВЕТЛАНА    01/21/25 0208 -- -- -- -- 87 % -- -- -- СВЕТЛАНА          CIWA Scores (last 5 days) before discharge       None          Blood Transfusion Record       Product Unit Status Volume Start End            Transfuse RBC       24  [id number]  [id number] Completed 01/19/25 0206 350 mL 01/18/25 2206 01/19/25 0100

## 2025-01-24 LAB
BACTERIA BLD CULT: POSITIVE
BACTERIA BLD CULT: POSITIVE
S EPIDERMIDIS DNA BLD POS QL NAA+NON-PRB: DETECTED

## 2025-01-28 NOTE — PROGRESS NOTES
PPHYSICIAN CLARIFICATION    Additional information related to patient's Staph Epi bacteremia and HD catheter.    Other - Staph Epi bacteremia unabble to determine if related to patient's HD catheter     This note is part of the patient's medical record.

## 2025-02-02 ENCOUNTER — HOSPITAL ENCOUNTER (INPATIENT)
Facility: HOSPITAL | Age: 45
LOS: 5 days | Discharge: HOME | End: 2025-02-07
Attending: EMERGENCY MEDICINE | Admitting: HOSPITALIST
Payer: MEDICARE

## 2025-02-02 ENCOUNTER — APPOINTMENT (OUTPATIENT)
Dept: GENERAL RADIOLOGY | Facility: HOSPITAL | Age: 45
End: 2025-02-02
Attending: EMERGENCY MEDICINE
Payer: MEDICARE

## 2025-02-02 DIAGNOSIS — R50.9 FEVER, UNSPECIFIED FEVER CAUSE: Primary | ICD-10-CM

## 2025-02-02 LAB
ADENOVIRUS PCR:: NOT DETECTED
ALBUMIN SERPL-MCNC: 4.4 G/DL (ref 3.2–4.8)
ALBUMIN/GLOB SERPL: 1.5 {RATIO} (ref 1–2)
ALP LIVER SERPL-CCNC: 90 U/L
ALT SERPL-CCNC: 17 U/L
ANION GAP SERPL CALC-SCNC: 20 MMOL/L (ref 0–18)
AST SERPL-CCNC: 45 U/L (ref ?–34)
B PARAPERT DNA SPEC QL NAA+PROBE: NOT DETECTED
B PERT DNA SPEC QL NAA+PROBE: NOT DETECTED
BASOPHILS # BLD AUTO: 0.13 X10(3) UL (ref 0–0.2)
BASOPHILS NFR BLD AUTO: 1.5 %
BILIRUB SERPL-MCNC: 0.5 MG/DL (ref 0.3–1.2)
BUN BLD-MCNC: 68 MG/DL (ref 9–23)
C PNEUM DNA SPEC QL NAA+PROBE: NOT DETECTED
CALCIUM BLD-MCNC: 8.4 MG/DL (ref 8.7–10.6)
CHLORIDE SERPL-SCNC: 98 MMOL/L (ref 98–112)
CO2 SERPL-SCNC: 21 MMOL/L (ref 21–32)
CORONAVIRUS 229E PCR:: NOT DETECTED
CORONAVIRUS HKU1 PCR:: NOT DETECTED
CORONAVIRUS NL63 PCR:: NOT DETECTED
CORONAVIRUS OC43 PCR:: DETECTED
CREAT BLD-MCNC: 15.7 MG/DL
EGFRCR SERPLBLD CKD-EPI 2021: 3 ML/MIN/1.73M2 (ref 60–?)
EOSINOPHIL # BLD AUTO: 0.35 X10(3) UL (ref 0–0.7)
EOSINOPHIL NFR BLD AUTO: 4.2 %
ERYTHROCYTE [DISTWIDTH] IN BLOOD BY AUTOMATED COUNT: 15.7 %
FLUAV RNA SPEC QL NAA+PROBE: NOT DETECTED
FLUBV RNA SPEC QL NAA+PROBE: NOT DETECTED
GLOBULIN PLAS-MCNC: 3 G/DL (ref 2–3.5)
GLUCOSE BLD-MCNC: 123 MG/DL (ref 70–99)
HCT VFR BLD AUTO: 30.6 %
HGB BLD-MCNC: 10.7 G/DL
IMM GRANULOCYTES # BLD AUTO: 0.06 X10(3) UL (ref 0–1)
IMM GRANULOCYTES NFR BLD: 0.7 %
LACTATE SERPL-SCNC: 1.1 MMOL/L (ref 0.5–2)
LYMPHOCYTES # BLD AUTO: 1.41 X10(3) UL (ref 1–4)
LYMPHOCYTES NFR BLD AUTO: 16.8 %
MCH RBC QN AUTO: 32.8 PG (ref 26–34)
MCHC RBC AUTO-ENTMCNC: 35 G/DL (ref 31–37)
MCV RBC AUTO: 93.9 FL
METAPNEUMOVIRUS PCR:: NOT DETECTED
MONOCYTES # BLD AUTO: 0.79 X10(3) UL (ref 0.1–1)
MONOCYTES NFR BLD AUTO: 9.4 %
MYCOPLASMA PNEUMONIA PCR:: NOT DETECTED
NEUTROPHILS # BLD AUTO: 5.66 X10 (3) UL (ref 1.5–7.7)
NEUTROPHILS # BLD AUTO: 5.66 X10(3) UL (ref 1.5–7.7)
NEUTROPHILS NFR BLD AUTO: 67.4 %
OSMOLALITY SERPL CALC.SUM OF ELEC: 309 MOSM/KG (ref 275–295)
PARAINFLUENZA 1 PCR:: NOT DETECTED
PARAINFLUENZA 2 PCR:: NOT DETECTED
PARAINFLUENZA 3 PCR:: NOT DETECTED
PARAINFLUENZA 4 PCR:: NOT DETECTED
PLATELET # BLD AUTO: 314 10(3)UL (ref 150–450)
POTASSIUM SERPL-SCNC: 3.8 MMOL/L (ref 3.5–5.1)
PROT SERPL-MCNC: 7.4 G/DL (ref 5.7–8.2)
RBC # BLD AUTO: 3.26 X10(6)UL
RHINOVIRUS/ENTERO PCR:: NOT DETECTED
RSV RNA SPEC QL NAA+PROBE: NOT DETECTED
SARS-COV-2 RNA NPH QL NAA+NON-PROBE: NOT DETECTED
SODIUM SERPL-SCNC: 139 MMOL/L (ref 136–145)
WBC # BLD AUTO: 8.4 X10(3) UL (ref 4–11)

## 2025-02-02 PROCEDURE — 5A1D70Z PERFORMANCE OF URINARY FILTRATION, INTERMITTENT, LESS THAN 6 HOURS PER DAY: ICD-10-PCS | Performed by: INTERNAL MEDICINE

## 2025-02-02 PROCEDURE — 87040 BLOOD CULTURE FOR BACTERIA: CPT | Performed by: EMERGENCY MEDICINE

## 2025-02-02 PROCEDURE — 96366 THER/PROPH/DIAG IV INF ADDON: CPT

## 2025-02-02 PROCEDURE — 99285 EMERGENCY DEPT VISIT HI MDM: CPT

## 2025-02-02 PROCEDURE — 90935 HEMODIALYSIS ONE EVALUATION: CPT | Performed by: INTERNAL MEDICINE

## 2025-02-02 PROCEDURE — 85025 COMPLETE CBC W/AUTO DIFF WBC: CPT | Performed by: EMERGENCY MEDICINE

## 2025-02-02 PROCEDURE — 80053 COMPREHEN METABOLIC PANEL: CPT | Performed by: EMERGENCY MEDICINE

## 2025-02-02 PROCEDURE — 96365 THER/PROPH/DIAG IV INF INIT: CPT

## 2025-02-02 PROCEDURE — 36415 COLL VENOUS BLD VENIPUNCTURE: CPT

## 2025-02-02 PROCEDURE — 0202U NFCT DS 22 TRGT SARS-COV-2: CPT | Performed by: INTERNAL MEDICINE

## 2025-02-02 PROCEDURE — 71045 X-RAY EXAM CHEST 1 VIEW: CPT | Performed by: EMERGENCY MEDICINE

## 2025-02-02 PROCEDURE — 83605 ASSAY OF LACTIC ACID: CPT | Performed by: EMERGENCY MEDICINE

## 2025-02-02 PROCEDURE — 96375 TX/PRO/DX INJ NEW DRUG ADDON: CPT

## 2025-02-02 RX ORDER — LOSARTAN POTASSIUM 100 MG/1
100 TABLET ORAL DAILY
Status: DISCONTINUED | OUTPATIENT
Start: 2025-02-02 | End: 2025-02-07

## 2025-02-02 RX ORDER — NIFEDIPINE 30 MG/1
30 TABLET, EXTENDED RELEASE ORAL DAILY
Status: DISCONTINUED | OUTPATIENT
Start: 2025-02-02 | End: 2025-02-07

## 2025-02-02 RX ORDER — ALBUMIN (HUMAN) 12.5 G/50ML
25 SOLUTION INTRAVENOUS
Status: ACTIVE | OUTPATIENT
Start: 2025-02-02 | End: 2025-02-04

## 2025-02-02 RX ORDER — LOSARTAN POTASSIUM 50 MG/1
100 TABLET ORAL ONCE
Status: COMPLETED | OUTPATIENT
Start: 2025-02-02 | End: 2025-02-02

## 2025-02-02 RX ORDER — MEMANTINE HYDROCHLORIDE 5 MG/1
5 TABLET ORAL 2 TIMES DAILY
Status: DISCONTINUED | OUTPATIENT
Start: 2025-02-02 | End: 2025-02-02

## 2025-02-02 RX ORDER — LAMOTRIGINE 100 MG/1
100 TABLET ORAL DAILY
Status: DISCONTINUED | OUTPATIENT
Start: 2025-02-02 | End: 2025-02-07

## 2025-02-02 RX ORDER — TAMSULOSIN HYDROCHLORIDE 0.4 MG/1
0.4 CAPSULE ORAL DAILY
Status: DISCONTINUED | OUTPATIENT
Start: 2025-02-02 | End: 2025-02-07

## 2025-02-02 RX ORDER — HYDRALAZINE HYDROCHLORIDE 20 MG/ML
10 INJECTION INTRAMUSCULAR; INTRAVENOUS ONCE
Status: COMPLETED | OUTPATIENT
Start: 2025-02-02 | End: 2025-02-02

## 2025-02-02 RX ORDER — CALCIUM ACETATE 667 MG/1
667 CAPSULE ORAL
Status: DISCONTINUED | OUTPATIENT
Start: 2025-02-02 | End: 2025-02-07

## 2025-02-02 RX ORDER — HYDRALAZINE HYDROCHLORIDE 25 MG/1
25 TABLET, FILM COATED ORAL 3 TIMES DAILY
Status: DISCONTINUED | OUTPATIENT
Start: 2025-02-02 | End: 2025-02-07

## 2025-02-02 RX ORDER — BISACODYL 10 MG
10 SUPPOSITORY, RECTAL RECTAL
Status: DISCONTINUED | OUTPATIENT
Start: 2025-02-02 | End: 2025-02-07

## 2025-02-02 RX ORDER — HEPARIN SODIUM 1000 [USP'U]/ML
1.5 INJECTION, SOLUTION INTRAVENOUS; SUBCUTANEOUS
Status: COMPLETED | OUTPATIENT
Start: 2025-02-02 | End: 2025-02-02

## 2025-02-02 RX ORDER — ALBUTEROL SULFATE 90 UG/1
2 INHALANT RESPIRATORY (INHALATION) EVERY 6 HOURS PRN
Status: DISCONTINUED | OUTPATIENT
Start: 2025-02-02 | End: 2025-02-07

## 2025-02-02 RX ORDER — POLYETHYLENE GLYCOL 3350 17 G/17G
17 POWDER, FOR SOLUTION ORAL DAILY PRN
Status: DISCONTINUED | OUTPATIENT
Start: 2025-02-02 | End: 2025-02-07

## 2025-02-02 RX ORDER — PROCHLORPERAZINE EDISYLATE 5 MG/ML
5 INJECTION INTRAMUSCULAR; INTRAVENOUS EVERY 8 HOURS PRN
Status: DISCONTINUED | OUTPATIENT
Start: 2025-02-02 | End: 2025-02-07

## 2025-02-02 RX ORDER — CARVEDILOL 12.5 MG/1
25 TABLET ORAL 2 TIMES DAILY WITH MEALS
Status: DISCONTINUED | OUTPATIENT
Start: 2025-02-02 | End: 2025-02-07

## 2025-02-02 RX ORDER — NIFEDIPINE 30 MG/1
30 TABLET, EXTENDED RELEASE ORAL ONCE
Status: COMPLETED | OUTPATIENT
Start: 2025-02-02 | End: 2025-02-02

## 2025-02-02 RX ORDER — ONDANSETRON 2 MG/ML
4 INJECTION INTRAMUSCULAR; INTRAVENOUS EVERY 6 HOURS PRN
Status: DISCONTINUED | OUTPATIENT
Start: 2025-02-02 | End: 2025-02-07

## 2025-02-02 RX ORDER — CLONIDINE HYDROCHLORIDE 0.1 MG/1
0.1 TABLET ORAL NIGHTLY
Status: DISCONTINUED | OUTPATIENT
Start: 2025-02-03 | End: 2025-02-07

## 2025-02-02 RX ORDER — MORPHINE SULFATE 4 MG/ML
4 INJECTION, SOLUTION INTRAMUSCULAR; INTRAVENOUS ONCE
Status: COMPLETED | OUTPATIENT
Start: 2025-02-02 | End: 2025-02-02

## 2025-02-02 RX ORDER — HEPARIN SODIUM 5000 [USP'U]/ML
5000 INJECTION, SOLUTION INTRAVENOUS; SUBCUTANEOUS EVERY 8 HOURS SCHEDULED
Status: DISCONTINUED | OUTPATIENT
Start: 2025-02-02 | End: 2025-02-07

## 2025-02-02 RX ORDER — CARVEDILOL 12.5 MG/1
25 TABLET ORAL ONCE
Status: COMPLETED | OUTPATIENT
Start: 2025-02-02 | End: 2025-02-02

## 2025-02-02 RX ORDER — BUPROPION HYDROCHLORIDE 150 MG/1
150 TABLET ORAL DAILY
Status: DISCONTINUED | OUTPATIENT
Start: 2025-02-02 | End: 2025-02-07

## 2025-02-02 RX ORDER — DIPHENHYDRAMINE HCL 25 MG
25 CAPSULE ORAL EVERY 6 HOURS PRN
Status: DISCONTINUED | OUTPATIENT
Start: 2025-02-02 | End: 2025-02-07

## 2025-02-02 RX ORDER — SEVELAMER CARBONATE 800 MG/1
2400 TABLET, FILM COATED ORAL
Status: DISCONTINUED | OUTPATIENT
Start: 2025-02-02 | End: 2025-02-07

## 2025-02-02 RX ORDER — CLONIDINE HYDROCHLORIDE 0.1 MG/1
0.1 TABLET ORAL ONCE
Status: COMPLETED | OUTPATIENT
Start: 2025-02-02 | End: 2025-02-02

## 2025-02-02 RX ORDER — SENNOSIDES 8.6 MG
17.2 TABLET ORAL NIGHTLY PRN
Status: DISCONTINUED | OUTPATIENT
Start: 2025-02-02 | End: 2025-02-07

## 2025-02-02 RX ORDER — HYDROCODONE BITARTRATE AND ACETAMINOPHEN 10; 325 MG/1; MG/1
1 TABLET ORAL EVERY 6 HOURS PRN
Status: DISCONTINUED | OUTPATIENT
Start: 2025-02-02 | End: 2025-02-07

## 2025-02-02 RX ORDER — GABAPENTIN 100 MG/1
200 CAPSULE ORAL 3 TIMES DAILY
Status: DISCONTINUED | OUTPATIENT
Start: 2025-02-02 | End: 2025-02-04

## 2025-02-02 NOTE — CONSULTS
[facility]   part of [facility] Infectious Disease  Report of Consultation    Godwin Fonseca Patient Status:  Inpatient    1980 MRN [id number]   Location [facility] 7NE-A Attending Jagdeep Joseph, DO   Hosp Day # 0 PCP Adrian Small MD     Date of Admission:  2025  Date of Consult:  2025    Reason for Consultation:  Recent bacteremia, ongoing treatment    History of Present Illness:  Godwin Fonseca is a a(n) 44 year old male being seen at your request regarding recent bacteremia.  Patient was recently admitted 25 with fevers and staph epi bacteremia with concurrent viral URI.  Patient does have a HD catheter which was used previously for HD access.  He was ultimately discharged on continued IV vancomycin planned through 25 to complete a 2 week course.      Patient presented to the ED today with some LIZBETH and failure to attend last two HD sessions.  Not only did he miss HD, he missed recent doses of IV vancomycin as well.  He reported body aches and fevers as well as N/V and decreased p.o. intake.     At his current dialysis center he is being dialyzed through his fistula without HD catheter being used at all.  He was advised to have this removed.    Patient was given dose of IV vancomycin in the ED.  Blood cultures were obtained.  We are asked to see and assist.    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[facility])    Congestive heart disease ([facility])    COPD (chronic obstructive pulmonary disease) ([facility])    Coronary atherosclerosis    Deep vein thrombosis ([facility])    at age 17 R/T cast    Depression    Diabetes ([facility])    Dialysis patient    Diverticulosis of large intestine    Essential hypertension    3/21 echo: severe concentric  LVH with normal EF and no MR or pHTN    Extrinsic asthma, unspecified    Heart attack (HCC)    2016- angiogram- no intervention    Heart valve disease    mitral valve repair in 1994/    High blood pressure    High cholesterol    History of blood transfusion    History of mitral valve repair    Hyperlipidemia    Low back pain    tight and stiff after sweeping and mopping    LVH (left ventricular hypertrophy)    Migraines    Mixed hyperlipidemia     HDL 38 LDL 97 VLDL 57     Monoclonal gammopathy    IgG kappa     Muscle weakness    MVP (mitral valve prolapse)    Repair 1994 at [place]; echoes as recently as 3/21 show mild or trivial MR and no stenosis    Neuropathy    Osteoarthritis    hip ,knees    Pneumonia due to organism    Pulmonary embolism (HCC)    Renal disorder    Stroke (HCC)    TIA (transient ischemic attack)    Initial history of left-sided weakness and slurred speech. (+) cocaine. MRI of the brain, CT angiogram of the head and neck, and 2D echo are all unremarkable.     TMJ (dislocation of temporomandibular joint)    Troponin level elevated    Trop 60 60 47 with TIA and no CP: Lexiscan negative with EF 51    Visual impairment     Past Surgical History:   Procedure Laterality Date    Av fistula revision, open Left     Cabg      Colonoscopy N/A 03/26/2023    Procedure: COLONOSCOPY;  Surgeon: Heath Vu MD;  Location:  ENDOSCOPY    Colonoscopy N/A 12/30/2023    Procedure: COLONOSCOPY with cold snare polypectomy and forcep polypectomy;  Surgeon: Ousmane Suarez MD;  Location:  ENDOSCOPY    Colonoscopy & polypectomy  2019    Egd  2019    Duodenitis. Biopsied. EUS for weight loss was negative    Heart surgery      Hernia surgery  08/17/2022    Dr Barnes    Laminectomy,>2 sgmt,lumbar  09/06/2018    L4-L5 Decomp Discectomy ROEM L4-L5    Mitralplasty w cp bypass  1994    [place]: Repair    Repair rotator cuff,chronic Left     torn and had a ruptured bicep    Sinus surgery        Spine surgery  procedure unlisted      Valve repair  1994    mitral valve     Family History   Problem Relation Age of Onset    Hypertension Father     Alcohol and Other Disorders Associated Father     Substance Abuse Father         cocaine    Dementia Father     Cancer Father         lung    Diabetes Mother     Cancer Mother         multiple myeloma    Hypertension Mother     Anxiety Maternal Aunt     Depression Maternal Aunt     Anxiety Maternal Aunt     Depression Maternal Aunt     Bipolar Disorder Maternal Aunt     Diabetes Maternal Grandmother     Hypertension Maternal Grandmother     Cancer Maternal Grandfather         stomach cancer    Diabetes Maternal Grandfather     Hypertension Maternal Grandfather     Alcohol and Other Disorders Associated Maternal Grandfather     Hypertension Paternal Grandmother     Hypertension Paternal Grandfather     Cancer Sister         uterine and ovarian    Hypertension Sister     Cancer Maternal Uncle         lung    Cancer Paternal Aunt         throat      reports that he quit smoking about 2 years ago. His smoking use included cigarettes. He started smoking about 29 years ago. He has a 27 pack-year smoking history. He has never been exposed to tobacco smoke. He has never used smokeless tobacco. He reports that he does not drink alcohol and does not use drugs.    Allergies:  Allergies[1]    Medications:    Current Facility-Administered Medications:     albuterol (Ventolin HFA) 108 (90 Base) MCG/ACT inhaler 2 puff, 2 puff, Inhalation, Q6H PRN    ARIPiprazole (Abilify) tab 15 mg, 15 mg, Oral, Daily    buPROPion ER (Wellbutrin XL) 24 hr tab 150 mg, 150 mg, Oral, Daily    calcium acetate (Phoslo) cap 667 mg, 667 mg, Oral, TID CC    carvedilol (Coreg) tab 25 mg, 25 mg, Oral, BID with meals    [START ON 2/3/2025] cloNIDine (Catapres) tab 0.1 mg, 0.1 mg, Oral, Nightly    FLUoxetine (PROzac) cap 40 mg, 40 mg, Oral, Daily    gabapentin (Neurontin) cap 200 mg, 200 mg, Oral, TID    hydrALAZINE  (Apresoline) tab 25 mg, 25 mg, Oral, TID    HYDROcodone-acetaminophen (Norco)  MG per tab 1 tablet, 1 tablet, Oral, Q6H PRN    lamoTRIgine (LaMICtal) tab 100 mg, 100 mg, Oral, Daily    losartan (Cozaar) tab 100 mg, 100 mg, Oral, Daily    NIFEdipine ER (Procardia-XL) 24 hr tab 30 mg, 30 mg, Oral, Daily    sevelamer carbonate (Renvela) tab 2,400 mg, 2,400 mg, Oral, TID CC    tamsulosin (Flomax) cap 0.4 mg, 0.4 mg, Oral, Daily    heparin (Porcine) 5000 UNIT/ML injection 5,000 Units, 5,000 Units, Subcutaneous, Q8H MARTHA    melatonin tab 3 mg, 3 mg, Oral, Nightly PRN    polyethylene glycol (PEG 3350) (Miralax) 17 g oral packet 17 g, 17 g, Oral, Daily PRN    sennosides (Senokot) tab 17.2 mg, 17.2 mg, Oral, Nightly PRN    bisacodyl (Dulcolax) 10 MG rectal suppository 10 mg, 10 mg, Rectal, Daily PRN    ondansetron (Zofran) 4 MG/2ML injection 4 mg, 4 mg, Intravenous, Q6H PRN    prochlorperazine (Compazine) 10 MG/2ML injection 5 mg, 5 mg, Intravenous, Q8H PRN    diphenhydrAMINE (Benadryl) cap/tab 25 mg, 25 mg, Oral, Q6H PRN    sodium chloride 0.9 % IV bolus 100 mL, 100 mL, Intravenous, Q30 Min PRN **AND** albumin human (Albumin) 25% injection 25 g, 25 g, Intravenous, PRN Dialysis    heparin (Porcine) 1000 UNIT/ML injection 1,500 Units, 1.5 mL, Intracatheter, Once    Review of Systems:    Constitutional:  Fevers, poor appetite.  Body aches.   HEENT:  No visual changes, oral ulcers, sore throat, difficulty swallowing.   Respiratory: SOB.   Cardiovascular: Negative for chest pain, palpitations, irregular heart beats.   Gastrointestinal:  No abdominal pain, nausea, vomiting, diarrhea, or constipation.   Genitourinary:  No dysuria, hematuria, urine urgency or frequency.   Integument/breast: Negative for rash, skin lesions, and pruritus.   Hematologic/lymphatic: Negative for easy bruising, bleeding, and lymphadenopathy.   Musculoskeletal: Negative for myalgias, arthralgias, muscle weakness.   Neurological: No focal neurologic  deficits, seizures, tremors.   Psych:  No h/o anxiety, depression, other psych d/o.   Endocrine: No history of of diabetes, thyroid disorder.    Remainder of 12 point review of systems otherwise negative.    Vital signs in last 24 hours:  Patient Vitals for the past 24 hrs:   BP Temp Temp src Pulse Resp SpO2 Weight   02/02/25 1128 -- -- -- -- -- -- 246 lb (111.6 kg)   02/02/25 1030 (!) 159/95 -- -- 97 20 98 % --   02/02/25 1000 (!) 120/99 -- -- 101 20 95 % --   02/02/25 0937 (!) 173/129 -- -- 90 18 100 % --   02/02/25 0845 (!) 200/136 -- -- 96 -- 100 % --   02/02/25 0815 -- -- -- 90 19 97 % --   02/02/25 0745 (!) 190/124 -- -- 97 (!) 29 100 % --   02/02/25 0715 (!) 189/116 -- -- 97 22 99 % --   02/02/25 0645 (!) 176/126 97.8 °F (36.6 °C) Temporal 98 20 98 % 246 lb 14.6 oz (112 kg)       Intake/Output:  I/O this shift:  In: 500 [IV PIGGYBACK:500]  Out: -     Physical Exam:   General: Awake, alert, non-tox and in NAD.   Head: Normocephalic, without obvious abnormality, atraumatic.   Eyes: Conjunctivae/corneas clear.  No scleral icterus.  No conjunctival     hemorrhage.   Nose: Nares normal.   Throat:  Oropharynx clear, MMs moist.   Neck: Trachea ML, no masses.   Lungs: CTA b/l no rhonchi, rales, wheezes.   Chest wall: No tenderness or deformity.   Heart: Regular rate and rhythm, normal S1S2, no murmurs.   Abdomen: Soft, NT/ND.  Bowel sounds present.  No organomegaly.   Extremity: No edema.   Skin: No rashes or lesions.   Neurological: No focal neurologic deficits.    Lab Data Review:  Lab Results   Component Value Date    WBC 8.4 02/02/2025    HGB 10.7 02/02/2025    HCT 30.6 02/02/2025    .0 02/02/2025    CREATSERUM 15.70 02/02/2025    BUN 68 02/02/2025     02/02/2025    K 3.8 02/02/2025    CL 98 02/02/2025    CO2 21.0 02/02/2025     02/02/2025    CA 8.4 02/02/2025    ALB 4.4 02/02/2025    ALKPHO 90 02/02/2025    BILT 0.5 02/02/2025    TP 7.4 02/02/2025    AST 45 02/02/2025    ALT 17 02/02/2025       Cultures:   Recent blood cultures positive CoNS 1/18/25, repeats negative 1/19/25    Blood cultures being repeated once again today    Radiology:  CONCLUSION:  There is blunting right costophrenic angle consistent with right effusion.       Assessment and Plan:    Recent admission for fevers in the setting of acute seasonal coronavirus infection and with 2/2 blood cultures isolating CoNS on 1/18/25  - While this was in the setting of HD catheter, bacteremia cleared quickly  - Patient was discharged on 2 additional weeks of IV vancomycin dosed with HD through 2/1/25  - Repeat blood cultures once again    2.  ESRD on HD  - Patient presented with SOB and RLL effusion with missed HD sessions this week  - Patient has also missed doses of IV vancomycin    3.  Multiple medical and psych issues as previous  - Supportive care    4.  Disposition - inpatient.  Given missed HD sessions, will restart IV vancomycin as well and continue while here.  Repeat blood cultures x 2 to assure no new breakthrough bacteremia.  As patient has been receiving HD via fistula, suggest removing HD catheter when able.  Check full RVP given new fevers.  Trending temps and WBCs.  Will follow with further recommendations.  D/w patient.    Radha Gan DO, [facility] Infectious Disease  [phone]    2/2/2025  12:48 PM         [1]   Allergies  Allergen Reactions    Hydrochlorothiazide RASH and HIVES    Fentanyl ITCHING and NAUSEA ONLY

## 2025-02-02 NOTE — ED QUICK NOTES
Orders for admission, patient is aware of plan and ready to go upstairs. Any questions, please call ED RN TWIN at extension [phone].     Patient Covid vaccination status: Fully vaccinated     COVID Test Ordered in ED: None    COVID Suspicion at Admission: N/A    Running Infusions:      Mental Status/LOC at time of transport: a/ox4    Other pertinent information:   CIWA score: N/A   NIH score:  N/A

## 2025-02-02 NOTE — CONSULTS
[facility]   part of [facility]    Report of Consultation    Godwin Fonseca Patient Status:  Inpatient    1980 MRN [id number]   Location [facility] 7NE-A Attending Jagdeep Joseph, DO   Hosp Day # 0 PCP Adrian Small MD     Date of consult: 2025    REASON FOR CONSULT:     ESRD    HISTORY OF PRESENT ILLNESS:     Godwin Fonseca is a a(n) 44 year old male w ho ESRD on HD MWF with LUE AVF (but also still have RIJ tunnelled HD catheter), HTN, severe MR s/p MVR x2, HFpEF, DVT/PE, TIA, MGUS, bipolar disorder, recurrent GI bleed who presents with missed HD treatment x 2, fever, body aches.     Was here earlier this month w staph epi bacteremia.   Notes last few days vomiting and unable to tolerate po intake  Last HD was Monday and they removed 4L UF    He states that at the HD center they only use his fistula now and there is no issues with it. States they do not use his catheter. States he was told to have the catheter removed.       REVIEW OF SYSTEMS:     Please see HPI for pertinent positives. 10 point review of systems otherwise reviewed and negative.     HISTORY:     Past Medical History:    Anxiety state    Arrhythmia    Asthma (HCC)    Attention deficit hyperactivity disorder (ADHD)    Back problem    Bipolar 1 disorder (HCC)    CKD (chronic kidney disease) stage 3, GFR 30-59 ml/min (HCC)    Dr Meeks    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HCC)    Congestive heart disease (HCC)    COPD (chronic obstructive pulmonary disease) (HCC)    Coronary atherosclerosis    Deep vein thrombosis (HCC)    at age 17 R/T cast    Depression    Diabetes (HCC)    Dialysis patient    Diverticulosis of large intestine    Essential hypertension    3/21 echo: severe concentric LVH with normal EF and no MR or pHTN    Extrinsic asthma, unspecified    Heart attack (HCC)    - angiogram- no intervention    Heart valve disease    mitral valve repair in /    High blood pressure    High cholesterol    History of blood  transfusion    History of mitral valve repair    Hyperlipidemia    Low back pain    tight and stiff after sweeping and mopping    LVH (left ventricular hypertrophy)    Migraines    Mixed hyperlipidemia     HDL 38 LDL 97 VLDL 57     Monoclonal gammopathy    IgG kappa     Muscle weakness    MVP (mitral valve prolapse)    Repair 1994 at [place]; echoes as recently as 3/21 show mild or trivial MR and no stenosis    Neuropathy    Osteoarthritis    hip ,knees    Pneumonia due to organism    Pulmonary embolism (HCC)    Renal disorder    Stroke (HCC)    TIA (transient ischemic attack)    Initial history of left-sided weakness and slurred speech. (+) cocaine. MRI of the brain, CT angiogram of the head and neck, and 2D echo are all unremarkable.     TMJ (dislocation of temporomandibular joint)    Troponin level elevated    Trop 60 60 47 with TIA and no CP: Lexiscan negative with EF 51    Visual impairment     Past Surgical History:   Procedure Laterality Date    Av fistula revision, open Left     Cabg      Colonoscopy N/A 03/26/2023    Procedure: COLONOSCOPY;  Surgeon: Heath Vu MD;  Location:  ENDOSCOPY    Colonoscopy N/A 12/30/2023    Procedure: COLONOSCOPY with cold snare polypectomy and forcep polypectomy;  Surgeon: Ousmane Suarez MD;  Location:  ENDOSCOPY    Colonoscopy & polypectomy  2019    [facility]  2019    Duodenitis. Biopsied. EUS for weight loss was negative    Heart surgery      Hernia surgery  08/17/2022    Dr Barnes    Laminectomy,>2 sgmt,lumbar  09/06/2018    L4-L5 Decomp Discectomy ROEM L4-L5    Mitralplasty w cp bypass  1994    [place]: Repair    Repair rotator cuff,chronic Left     torn and had a ruptured bicep    Sinus surgery        Spine surgery procedure unlisted      Valve repair  1994    mitral valve     Family History   Problem Relation Age of Onset    Hypertension Father     Alcohol and Other Disorders Associated Father     Substance Abuse Father         cocaine    Dementia Father      Cancer Father         lung    Diabetes Mother     Cancer Mother         multiple myeloma    Hypertension Mother     Anxiety Maternal Aunt     Depression Maternal Aunt     Anxiety Maternal Aunt     Depression Maternal Aunt     Bipolar Disorder Maternal Aunt     Diabetes Maternal Grandmother     Hypertension Maternal Grandmother     Cancer Maternal Grandfather         stomach cancer    Diabetes Maternal Grandfather     Hypertension Maternal Grandfather     Alcohol and Other Disorders Associated Maternal Grandfather     Hypertension Paternal Grandmother     Hypertension Paternal Grandfather     Cancer Sister         uterine and ovarian    Hypertension Sister     Cancer Maternal Uncle         lung    Cancer Paternal Aunt         throat      reports that he quit smoking about 2 years ago. His smoking use included cigarettes. He started smoking about 29 years ago. He has a 27 pack-year smoking history. He has never been exposed to tobacco smoke. He has never used smokeless tobacco. He reports that he does not drink alcohol and does not use drugs.    ALLERGIES:     Allergies[1]    MEDICATIONS:       Current Facility-Administered Medications:     albuterol (Ventolin HFA) 108 (90 Base) MCG/ACT inhaler 2 puff, 2 puff, Inhalation, Q6H PRN    ARIPiprazole (Abilify) tab 15 mg, 15 mg, Oral, Daily    buPROPion ER (Wellbutrin XL) 24 hr tab 150 mg, 150 mg, Oral, Daily    calcium acetate (Phoslo) cap 667 mg, 667 mg, Oral, Daily    carvedilol (Coreg) tab 25 mg, 25 mg, Oral, BID with meals    [START ON 2/3/2025] cloNIDine (Catapres) tab 0.1 mg, 0.1 mg, Oral, Nightly    FLUoxetine (PROzac) cap 40 mg, 40 mg, Oral, Daily    gabapentin (Neurontin) cap 200 mg, 200 mg, Oral, TID    hydrALAZINE (Apresoline) tab 25 mg, 25 mg, Oral, BID    HYDROcodone-acetaminophen (Norco) 7.5-325 MG per tab 1 tablet, 1 tablet, Oral, Q6H PRN    lamoTRIgine (LaMICtal) tab 100 mg, 100 mg, Oral, Daily    losartan (Cozaar) tab 100 mg, 100 mg, Oral, Daily     memantine (Namenda) tab 5 mg, 5 mg, Oral, BID    NIFEdipine ER (Procardia-XL) 24 hr tab 30 mg, 30 mg, Oral, Daily    sevelamer carbonate (Renvela) tab 2,400 mg, 2,400 mg, Oral, TID CC    tamsulosin (Flomax) cap 0.4 mg, 0.4 mg, Oral, Daily    heparin (Porcine) 5000 UNIT/ML injection 5,000 Units, 5,000 Units, Subcutaneous, Q8H MARTHA    melatonin tab 3 mg, 3 mg, Oral, Nightly PRN    polyethylene glycol (PEG 3350) (Miralax) 17 g oral packet 17 g, 17 g, Oral, Daily PRN    sennosides (Senokot) tab 17.2 mg, 17.2 mg, Oral, Nightly PRN    bisacodyl (Dulcolax) 10 MG rectal suppository 10 mg, 10 mg, Rectal, Daily PRN    ondansetron (Zofran) 4 MG/2ML injection 4 mg, 4 mg, Intravenous, Q6H PRN    prochlorperazine (Compazine) 10 MG/2ML injection 5 mg, 5 mg, Intravenous, Q8H PRN    diphenhydrAMINE (Benadryl) cap/tab 25 mg, 25 mg, Oral, Q6H PRN    sodium chloride 0.9 % IV bolus 100 mL, 100 mL, Intravenous, Q30 Min PRN **AND** albumin human (Albumin) 25% injection 25 g, 25 g, Intravenous, PRN Dialysis    heparin (Porcine) 1000 UNIT/ML injection 1,500 Units, 1.5 mL, Intracatheter, Once  No current outpatient medications on file.         PHYSICAL EXAM:     Vital Signs: BP (!) 159/95   Pulse 97   Temp 97.8 °F (36.6 °C) (Temporal)   Resp 20   Wt 246 lb (111.6 kg)   SpO2 98%   BMI 31.58 kg/m²   Temp (24hrs), Av.8 °F (36.6 °C), Min:97.8 °F (36.6 °C), Max:97.8 °F (36.6 °C)       Intake/Output Summary (Last 24 hours) at 2025 1232  Last data filed at 2025 1038  Gross per 24 hour   Intake 500 ml   Output --   Net 500 ml     Wt Readings from Last 3 Encounters:   25 246 lb (111.6 kg)   25 246 lb 14.6 oz (112 kg)   25 244 lb 11.4 oz (111 kg)       General: NAD  HEENT: NCAT, MMM, EOMI  Neck: Supple   Cardiac: Regular rate and rhythm   Lungs: CTAB   Abdomen: Soft, non-tender, non-distended   : No CVA tenderness  Extremities: no leg edema  Neurologic/Psych: mentating well, no asterixis  Skin: No  demarcus    LABORATORY DATA:       Lab Results   Component Value Date     (H) 02/02/2025    BUN 68 (H) 02/02/2025    BUNCREA 13.0 03/07/2022    CREATSERUM 15.70 (H) 02/02/2025    ANIONGAP 20 (H) 02/02/2025    GFR 59 (L) 01/04/2018    GFRNAA 30 (L) 07/12/2022    GFRAA 35 (L) 07/12/2022    CA 8.4 (L) 02/02/2025    OSMOCALC 309 (H) 02/02/2025    ALKPHO 90 02/02/2025    AST 45 (H) 02/02/2025    ALT 17 02/02/2025    BILT 0.5 02/02/2025    TP 7.4 02/02/2025    ALB 4.4 02/02/2025    GLOBULIN 3.0 02/02/2025     02/02/2025    K 3.8 02/02/2025    CL 98 02/02/2025    CO2 21.0 02/02/2025     Lab Results   Component Value Date    WBC 8.4 02/02/2025    RBC 3.26 (L) 02/02/2025    HGB 10.7 (L) 02/02/2025    HCT 30.6 (L) 02/02/2025    .0 02/02/2025    MPV 11.5 12/14/2012    MCV 93.9 02/02/2025    MCH 32.8 02/02/2025    MCHC 35.0 02/02/2025    RDW 15.7 02/02/2025    NEPRELIM 5.66 02/02/2025    NEPERCENT 67.4 02/02/2025    LYPERCENT 16.8 02/02/2025    MOPERCENT 9.4 02/02/2025    EOPERCENT 4.2 02/02/2025    BAPERCENT 1.5 02/02/2025    NE 5.66 02/02/2025    LYMABS 1.41 02/02/2025    MOABSO 0.79 02/02/2025    EOABSO 0.35 02/02/2025    BAABSO 0.13 02/02/2025     Lab Results   Component Value Date    MALBP 167.00 05/24/2023    CREUR 142.00 05/24/2023    CREUR 142.00 05/24/2023     Lab Results   Component Value Date    COLORUR Light-Yellow 11/17/2024    CLARITY Clear 11/17/2024    SPECGRAVITY 1.013 11/17/2024    GLUUR Normal 11/17/2024    BILUR Negative 11/17/2024    KETUR Negative 11/17/2024    BLOODURINE Negative 11/17/2024    PHURINE 8.5 (H) 11/17/2024    PROUR 100 (A) 11/17/2024    UROBILINOGEN Normal 11/17/2024    NITRITE Negative 11/17/2024    LEUUR Negative 11/17/2024    WBCUR 1-5 11/17/2024    RBCUR 0-2 11/17/2024    EPIUR Few (A) 11/17/2024    BACUR Rare (A) 11/17/2024    CAOXUR Occasional (A) 04/20/2018    HYLUR Present (A) 05/14/2019         IMAGING:     All imaging studies personally reviewed.    XR CHEST AP  PORTABLE  (CPT=71045)    Result Date: 2/2/2025  CONCLUSION:  There is blunting right costophrenic angle consistent with right effusion.   LOCATION:  [facility]      Dictated by (CST): Adrian Blevins MD on 2/02/2025 at 8:16 AM     Finalized by (CST): Adrian Blevins MD on 2/02/2025 at 8:17 AM          ASSESSMENT/PLAN:   Godwin Fonseca is a a(n) 44 year old  male w ho ESRD on HD MWF with LUE AVF (but also still have RIJ tunnelled HD catheter), HTN, severe MR s/p MVR x2, HFpEF, DVT/PE, TIA, MGUS, bipolar disorder, recurrent GI bleed who presents with missed HD treatment x 2, fever, body aches.      ESRD on HD  -- HD MWF per schedule, missed W and F, so will do HD today. Less UF given vomiting and unable to tolerate much po. Will try to use AVF and see if can remove catheter if it works or if needed from ID standpoint.     Anemia  -- epo prn hgb < 10     MBD / Hyperphosphatemia  -- increased sevelamer to 2400 TID AC  -- ca acetate 667 tid ac  -- reiterated low phos diet     HTN:  -- pt takes coreg and losartan pre-HD  -- takes remaining hypertensives pre-HD if systolic > 180 but should take clonidine consistently to avoid rebound    Fevers / recent bacteremia  -- ID consult. Abx per ID. Await ID recs re removing line as well      D/w Dr Joseph    Thank you for allowing me to participate in the care of your patient. Please do not hesitate to contact me with concerns or questions.    Samaria Nava MD  [facility]    2/2/2025  12:32 PM         [1]   Allergies  Allergen Reactions    Hydrochlorothiazide RASH and HIVES    Fentanyl ITCHING and NAUSEA ONLY

## 2025-02-02 NOTE — PROGRESS NOTES
[facility] Pharmacy Dosing Service      Initial Pharmacokinetic Consult for Vancomycin Dosing     Godwin Fonseca is a 44 year old male who is being continued on vancomycin therapy for  line infection from previous admission .  Pharmacy has been asked to dose vancomycin by Dr Joseph.  The initial treatment and monitoring approach will be non-AUC strategy.  Pt was on vancomycin w/HD (1 gram w/HD through 25) prior to admit; he missed his last 2 HD sessions (Wed/Fri) so he also missed his vancomycin doses. He received a vancomycin load this morning in ER 2250 mg IV.      Weight and Temperature:    Wt Readings from Last 1 Encounters:   25 111.6 kg (246 lb)        Temp Readings from Last 1 Encounters:   25 97.8 °F (36.6 °C) (Temporal)      Labs:   Recent Labs   Lab 25  0702   CREATSERUM 15.70*      Estimated Creatinine Clearance: 6.8 mL/min (A) (based on SCr of 15.7 mg/dL (H)).     Recent Labs   Lab 25  0702   WBC 8.4          The Pharmacokinetic Target is:    Trough/random 15-20 mg/L (applies to IV dosing in HD and IP dosing in APD)    Renal Dosing Considerations:    HD: Home regimen: M/W/F; HD today      Assessment/Plan:   Initial/Loading dose: Has received 2250 mg IV (25 mg/kg, capped at 2250 mg) x 1 loading dose. In ER this morning; he is receiving HD today;      Maintenance dose: Pharmacy will dose vancomycin per levels    Monitorin) Plan for vancomycin random level to be obtained  prior to next HD session    2) Pharmacy will order SCr as clinically indicated to assess renal function.    3) Pharmacy will monitor for toxicity and efficacy, adjust vancomycin dose and/or frequency, and order vancomycin levels as appropriate per the Pharmacy and Therapeutics Committee approved protocol until discontinuation of the medication.       We appreciate the opportunity to assist in the care of this patient.     Francine Escalera PharmD  2025  1:17 PM  [facility] Extension:  [phone]

## 2025-02-02 NOTE — ED PROVIDER NOTES
Patient Seen in: [facility] Emergency Department      History     Chief Complaint   Patient presents with    Difficulty Breathing     Stated Complaint: LIZBETH    Subjective:   HPI      Patient here complaining of fevers chills body aches.  He has medical issues noted below.  ESRD on dialysis.  He is recently diagnosed with a line infection in his dialysis port in his right upper chest.  He states he missed last 2 sessions of dialysis and has been having fevers chills and bodyaches.      No nausea no vomiting no cough.  He has she denies shortness of breath.    Objective:     Past Medical History:    Anxiety state    Arrhythmia    Asthma ([facility])    Attention deficit hyperactivity disorder (ADHD)    Back problem    Bipolar 1 disorder ([facility])    CKD (chronic kidney disease) stage 3, GFR 30-59 ml/min ([facility])    Dr Meeks    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[facility])    Congestive heart disease ([facility])    COPD (chronic obstructive pulmonary disease) ([facility])    Coronary atherosclerosis    Deep vein thrombosis ([facility])    at age 17 R/T cast    Depression    Diabetes ([facility])    Dialysis patient    Diverticulosis of large intestine    Essential hypertension    3/21 echo: severe concentric LVH with normal EF and no MR or pHTN    Extrinsic asthma, unspecified    Heart attack ([facility])    2016- angiogram- no intervention    Heart valve disease    mitral valve repair in 1994/    High blood pressure    High cholesterol    History of blood transfusion    History of mitral valve repair    Hyperlipidemia    Low back pain    tight and stiff after sweeping and mopping    LVH (left ventricular hypertrophy)    Migraines    Mixed hyperlipidemia     HDL 38 LDL 97 VLDL 57     Monoclonal gammopathy    IgG kappa     Muscle weakness    MVP (mitral valve prolapse)    Repair 1994 at [place]; echoes as recently as 3/21 show mild or trivial MR and no stenosis    Neuropathy    Osteoarthritis    hip ,knees    Pneumonia due to organism    Pulmonary embolism  (HCC)    Renal disorder    Stroke (HCC)    TIA (transient ischemic attack)    Initial history of left-sided weakness and slurred speech. (+) cocaine. MRI of the brain, CT angiogram of the head and neck, and 2D echo are all unremarkable.     TMJ (dislocation of temporomandibular joint)    Troponin level elevated    Trop 60 60 47 with TIA and no CP: Lexiscan negative with EF 51    Visual impairment              Past Surgical History:   Procedure Laterality Date    Av fistula revision, open Left     Cabg      Colonoscopy N/A 2023    Procedure: COLONOSCOPY;  Surgeon: Heath Vu MD;  Location:  ENDOSCOPY    Colonoscopy N/A 2023    Procedure: COLONOSCOPY with cold snare polypectomy and forcep polypectomy;  Surgeon: Ousmane Suarez MD;  Location:  ENDOSCOPY    Colonoscopy & polypectomy      [facility]  2019    Duodenitis. Biopsied. EUS for weight loss was negative    Heart surgery      Hernia surgery  2022    Dr Barnes    Laminectomy,>2 sgmt,lumbar  2018    L4-L5 Decomp Discectomy [facility] L4-L5    Mitralplasty w cp bypass      [place]: Repair    Repair rotator cuff,chronic Left     torn and had a ruptured bicep    Sinus surgery        Spine surgery procedure unlisted      Valve repair      mitral valve                Social History     Socioeconomic History    Marital status:    Tobacco Use    Smoking status: Former     Current packs/day: 0.00     Average packs/day: 1 pack/day for 27.0 years (27.0 ttl pk-yrs)     Types: Cigarettes     Start date: 1995     Quit date: 2022     Years since quittin.9     Passive exposure: Never    Smokeless tobacco: Never   Vaping Use    Vaping status: Never Used   Substance and Sexual Activity    Alcohol use: No    Drug use: No     Social Drivers of Health     Financial Resource Strain: Medium Risk (2024)    Received from [facility]    Overall Financial Resource Strain (CARDIA)     Difficulty of Paying Living Expenses:  Somewhat hard   Food Insecurity: No Food Insecurity (1/18/2025)    Food Insecurity     Food Insecurity: Never true   Transportation Needs: No Transportation Needs (1/18/2025)    Transportation Needs     Lack of Transportation: No   Physical Activity: Inactive (5/7/2024)    Received from [facility]    Exercise Vital Sign     Days of Exercise per Week: 0 days     Minutes of Exercise per Session: 0 min   Stress: Stress Concern Present (5/7/2024)    Received from [facility]    [employer] - Occupational Stress Questionnaire     Feeling of Stress : Rather much   Social Connections: Unknown (5/7/2024)    Received from [facility]    Social Connection and Isolation Panel [NHANES]     Frequency of Communication with Friends and Family: More than three times a week     Frequency of Social Gatherings with Friends and Family: More than three times a week     Attends Confucianist Services: Never     Active Member of Clubs or Organizations: No     Attends Club or Organization Meetings: Never     Marital Status: Patient unable to answer   Housing Stability: Low Risk  (1/18/2025)    Housing Stability     Housing Instability: No                  Physical Exam     ED Triage Vitals [02/02/25 0645]   BP (!) 176/126   Pulse 98   Resp 20   Temp 97.8 °F (36.6 °C)   Temp src Temporal   SpO2 98 %   O2 Device None (Room air)       Current Vitals:   Vital Signs  BP: (!) 173/129  Pulse: 90  Resp: 18  Temp: 97.8 °F (36.6 °C)  Temp src: Temporal  MAP (mmHg): (!) 154    Oxygen Therapy  SpO2: 100 %  O2 Device: None (Room air)        Physical Exam  Physical Exam   Constitutional: Awake, alert, well appearing  Head: Normocephalic and atraumatic.   Eyes: Conjunctivae are normal. Pupils are equal, round, and reactive to light.   Neck: Normal range of motion. No JVD  Cardiovascular: Normal rate, regular rhythm  Pulmonary/Chest: Normal effort.  No accessory muscle use.  No cyanosis.  Abdominal:  Soft. Not distended.  Neurological: Pt is alert and oriented to person, place, and time. no cranial nerve deficits. Speech fluent      Port in place right chest    ED Course     Labs Reviewed   COMP METABOLIC PANEL (14) - Abnormal; Notable for the following components:       Result Value    Glucose 123 (*)     Anion Gap 20 (*)     BUN 68 (*)     Creatinine 15.70 (*)     Calcium, Total 8.4 (*)     Calculated Osmolality 309 (*)     eGFR-Cr 3 (*)     AST 45 (*)     All other components within normal limits   CBC WITH DIFFERENTIAL WITH PLATELET - Abnormal; Notable for the following components:    RBC 3.26 (*)     HGB 10.7 (*)     HCT 30.6 (*)     All other components within normal limits   LACTIC ACID, PLASMA - Normal   RAINBOW DRAW LAVENDER   RAINBOW DRAW LIGHT GREEN   RAINBOW DRAW BLUE   RAINBOW DRAW GOLD   BLOOD CULTURE   BLOOD CULTURE            Blood work reviewed, consistent with a [facility] CT says.  Blood cultures lactic acid drawn.  Lactic acid is fine  Prior records reviewed.  Staph infection in dialysis port, plan was for vancomycin dosing during dialysis.       MDM            Differential diagnoses considered: Port infection, bacteremia, less likely viral process, acute pulmonary edema, attempt hypertensive urgency all considered    -Patient missed the last 2 sessions of dialysis meaning he is also missed his antibiotics.  , the patient is pretty hypertensive, - does not  need emergent dialysis now.  his respiratory status is adequate his electrolytes are acceptable  He reported fevers and chills, concerned that his infection may not be adequately treated and he may be bacteremic and.  Blood cultures will plan on admission.      -Nephrology consult, ID consult          I visualized the radiology studies, my independent interpretation: No pulmonary edema noted on chest x-ray    *Discussion of ongoing management of this patient's care included: admitting physician, nephrology  *Comorbidities contributing to the  complexity of decision making: Recent line infection, ESRD on dialysis,    *External charts reviewed: n/a  *Additional sources of history: n/a    Shared decision making was done by: patient, myself.      Admission disposition: 2/2/2025  9:07 AM           Medical Decision Making      Disposition and Plan     Clinical Impression:  1. Fever, unspecified fever cause         Disposition:  Admit  2/2/2025  9:07 am    Follow-up:  No follow-up provider specified.        Medications Prescribed:  Current Discharge Medication List              Supplementary Documentation:         Hospital Problems       Present on Admission  Date Reviewed: 1/18/2025            ICD-10-CM Noted POA    * (Principal) Fever, unspecified fever cause R50.9 2/2/2025 Unknown

## 2025-02-02 NOTE — PLAN OF CARE
Patient came to our unit, dialysis today, normal days MWF, due to missing 2 days, 2L were removed  Admission completed,   A&O4  On Room air  Complaints of itching when coming up to unit after receiving fentanyl, oral benadryl given. Improvements   Lungs were diminished in posterior bases  No dyspnea when lying   Call light within reach, all safety measures maintained       Problem: CARDIOVASCULAR - ADULT  Goal: Maintains optimal cardiac output and hemodynamic stability  Description: INTERVENTIONS:  - Monitor vital signs, rhythm, and trends  - Monitor for bleeding, hypotension and signs of decreased cardiac output  - Evaluate effectiveness of vasoactive medications to optimize hemodynamic stability  - Monitor arterial and/or venous puncture sites for bleeding and/or hematoma  - Assess quality of pulses, skin color and temperature  - Assess for signs of decreased coronary artery perfusion - ex. Angina  - Evaluate fluid balance, assess for edema, trend weights  Outcome: Progressing     Problem: RESPIRATORY - ADULT  Goal: Achieves optimal ventilation and oxygenation  Description: INTERVENTIONS:  - Assess for changes in respiratory status  - Assess for changes in mentation and behavior  - Position to facilitate oxygenation and minimize respiratory effort  - Oxygen supplementation based on oxygen saturation or ABGs  - Provide Smoking Cessation handout, if applicable  - Encourage broncho-pulmonary hygiene including cough, deep breathe, Incentive Spirometry  - Assess the need for suctioning and perform as needed  - Assess and instruct to report SOB or any respiratory difficulty  - Respiratory Therapy support as indicated  - Manage/alleviate anxiety  - Monitor for signs/symptoms of CO2 retention  Outcome: Progressing     Problem: METABOLIC/FLUID AND ELECTROLYTES - ADULT  Goal: Hemodynamic stability and optimal renal function maintained  Description: INTERVENTIONS:  - Monitor labs and assess for signs and symptoms of volume  excess or deficit  - Monitor intake, output and patient weight  - Monitor urine specific gravity, serum osmolarity and serum sodium as indicated or ordered  - Monitor response to interventions for patient's volume status, including labs, urine output, blood pressure (other measures as available)  - Encourage oral intake as appropriate  - Instruct patient on fluid and nutrition restrictions as appropriate  Outcome: Progressing     Problem: HEMATOLOGIC - ADULT  Goal: Maintains hematologic stability  Description: INTERVENTIONS  - Assess for signs and symptoms of bleeding or hemorrhage  - Monitor labs and vital signs for trends  - Administer supportive blood products/factors, fluids and medications as ordered and appropriate  - Administer supportive blood products/factors as ordered and appropriate  Outcome: Progressing

## 2025-02-02 NOTE — ED QUICK NOTES
Rounding completed   Warm blanket given to pt  Call light within reach.  Bed locked & in low position  Pt on phone w/ family member  Pain medication given- see MAR

## 2025-02-02 NOTE — H&P
[facility] Hospitalist History and Physical      PCP: Adrian Small MD    History of Present Illness: This is the story of Mr. Raymundo Connelly who is a 45 y/o M w/ PMHx of ESRD MWF, HTN, Severe MR s/p MVR, HFpEF, Hx of Prior DVT/PE, TIA, Bipolar Disorder, Recurrent prior GI bleeds who presents to the hospital after missing 2 dialysis sessions, fevers, body aches. He was recently admitted to the hospital w/ staph epi bacteremia/viral URI. During that admission he was found to have a line infection and was discharged on IV Vanco until 2/1 as per last ID note. His last dose of IV abx was the last time he got dialysis which was on Monday. He states that the dialysis center has NOT been using his HD catheter as he has a LUE fistula. Patient states that he has been trying to transfer to [facility] dialysis which is 5 minutes from his house instead of his current dialysis center which is 20 minutes from his house. This distance is directly contributing to his recurrent readmissions.  ED Vitals: HTN to 190s-200s systolic  Labs:   Labs indicating need for HD  HgB 10.7    CXR: R pleural effusion  Patient was found to be positive for Coronavirus, not COVID19, which also appears he was positive for in January  Bcx were drawn  He received several of his BP medications in the ED along w/ IV dose of Hydralazine, IV Morphine, Fentanyl and Vanc. ID and Nephrology were both consulted and patient was admitted to the hospital for further evaluation.    Past Medical History:    Anxiety state    Arrhythmia    Asthma ([facility])    Attention deficit hyperactivity disorder (ADHD)    Back problem    Bipolar 1 disorder ([facility])    CKD (chronic kidney disease) stage 3, GFR 30-59 ml/min ([facility])    Dr Meeks    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[facility])    Congestive heart disease (HCC)    COPD (chronic obstructive pulmonary disease) (Formerly [facility])    Coronary atherosclerosis    Deep vein thrombosis ([facility])    at age 17 R/T cast    Depression    Diabetes ([facility])     Dialysis patient    Diverticulosis of large intestine    Essential hypertension    3/21 echo: severe concentric LVH with normal EF and no MR or pHTN    Extrinsic asthma, unspecified    Heart attack (HCC)    2016- angiogram- no intervention    Heart valve disease    mitral valve repair in 1994/    High blood pressure    High cholesterol    History of blood transfusion    History of mitral valve repair    Hyperlipidemia    Low back pain    tight and stiff after sweeping and mopping    LVH (left ventricular hypertrophy)    Migraines    Mixed hyperlipidemia     HDL 38 LDL 97 VLDL 57     Monoclonal gammopathy    IgG kappa     Muscle weakness    MVP (mitral valve prolapse)    Repair 1994 at [facility]; echoes as recently as 3/21 show mild or trivial MR and no stenosis    Neuropathy    Osteoarthritis    hip ,knees    Pneumonia due to organism    Pulmonary embolism (HCC)    Renal disorder    Stroke (HCC)    TIA (transient ischemic attack)    Initial history of left-sided weakness and slurred speech. (+) cocaine. MRI of the brain, CT angiogram of the head and neck, and 2D echo are all unremarkable.     TMJ (dislocation of temporomandibular joint)    Troponin level elevated    Trop 60 60 47 with TIA and no CP: Lexiscan negative with EF 51    Visual impairment      Past Surgical History:   Procedure Laterality Date    Av fistula revision, open Left     Cabg      Colonoscopy N/A 03/26/2023    Procedure: COLONOSCOPY;  Surgeon: Heath Vu MD;  Location:  ENDOSCOPY    Colonoscopy N/A 12/30/2023    Procedure: COLONOSCOPY with cold snare polypectomy and forcep polypectomy;  Surgeon: Ousmane Suarez MD;  Location:  ENDOSCOPY    Colonoscopy & polypectomy  2019    Egd  2019    Duodenitis. Biopsied. EUS for weight loss was negative    Heart surgery      Hernia surgery  08/17/2022    Dr Barnes    Laminectomy,>2 sgmt,lumbar  09/06/2018    L4-L5 Decomp Discectomy ROEM L4-L5    Mitralplasty w cp bypass  1994    [facility]:  Repair    Repair rotator cuff,chronic Left     torn and had a ruptured bicep    Sinus surgery        Spine surgery procedure unlisted      Valve repair      mitral valve        No current outpatient medications on file.       Social History     Tobacco Use    Smoking status: Former     Current packs/day: 0.00     Average packs/day: 1 pack/day for 27.0 years (27.0 ttl pk-yrs)     Types: Cigarettes     Start date: 1995     Quit date: 2022     Years since quittin.9     Passive exposure: Never    Smokeless tobacco: Never   Substance Use Topics    Alcohol use: No        OBJECTIVE:  BP (!) 179/109 (BP Location: Right arm)   Pulse 95   Temp 98.3 °F (36.8 °C) (Oral)   Resp 26   Wt 246 lb (111.6 kg)   SpO2 91%   BMI 31.58 kg/m²   Gen: No acute distress, alert and oriented x3  Pulm: Lungs clear bilaterally, normal respiratory effort  CV: Heart with regular rate and rhythm  Abd: Abdomen soft, nontender, non-distended  Skin: no rashes or lesions  Extremities: no edema noted  Neuro:  no focal deficits      Data Review:    Pertinent Labs and Imaging independently reviewed    Assessment/Plan:   Outpatient records or previous hospital records reviewed.   [facility] Hospitalist to continue to follow patient while in house    A/P: 45 y/o M w/ PMHx of ESRD MWF, HTN, Severe MR s/p MVR, HFpEF, Hx of Prior DVT/PE, TIA, Bipolar Disorder, Recurrent prior GI bleeds who presents to the hospital after missing 2 dialysis sessions, fevers, body aches    Missed Dialysis  Hx of ESRD MWF  Hyperphosphatemia  Plan:  - Nephrology consulted, planning to dialyze today  - Considering removal of HD catheter  - Continue home meds, Sevalamer increased by Nephro    Hypertensive Urgency  Plan:  - Resume home meds, Coreg, Clonidine, Hydralazine, Losartan, Nifedipine  - PRN Hydralazine    Recent Staph Epi bacteremia  Current Coronavirus URI  Plan:  - ID consulted, continue IV Vanc for now  - Repeat Bcx ordered    BPD  - Resume home  meds    BPH  - Flomax    A total of 42 minutes taken with patient and coordinating care.  Greater than 50% face to face encounter.      Jagdeep Joseph D.O.  [facility]     **Certification      PHYSICIAN Certification of Need for Inpatient Hospitalization - Initial Certification    Patient will require inpatient services that will reasonably be expected to span two midnight's based on the clinical documentation in H+P.   Based on patients current state of illness, I anticipate that, after discharge, patient will require TBD.

## 2025-02-03 ENCOUNTER — APPOINTMENT (OUTPATIENT)
Dept: INTERVENTIONAL RADIOLOGY/VASCULAR | Facility: HOSPITAL | Age: 45
End: 2025-02-03
Attending: INTERNAL MEDICINE
Payer: MEDICARE

## 2025-02-03 LAB
ALBUMIN SERPL-MCNC: 3.9 G/DL (ref 3.2–4.8)
ANION GAP SERPL CALC-SCNC: 14 MMOL/L (ref 0–18)
BASOPHILS # BLD AUTO: 0.1 X10(3) UL (ref 0–0.2)
BASOPHILS NFR BLD AUTO: 1.8 %
BUN BLD-MCNC: 46 MG/DL (ref 9–23)
CALCIUM BLD-MCNC: 7.7 MG/DL (ref 8.7–10.6)
CHLORIDE SERPL-SCNC: 99 MMOL/L (ref 98–112)
CO2 SERPL-SCNC: 28 MMOL/L (ref 21–32)
CREAT BLD-MCNC: 10.61 MG/DL
EGFRCR SERPLBLD CKD-EPI 2021: 6 ML/MIN/1.73M2 (ref 60–?)
EOSINOPHIL # BLD AUTO: 0.52 X10(3) UL (ref 0–0.7)
EOSINOPHIL NFR BLD AUTO: 9.5 %
ERYTHROCYTE [DISTWIDTH] IN BLOOD BY AUTOMATED COUNT: 16.1 %
GLUCOSE BLD-MCNC: 102 MG/DL (ref 70–99)
HCT VFR BLD AUTO: 30 %
HGB BLD-MCNC: 10 G/DL
IMM GRANULOCYTES # BLD AUTO: 0.01 X10(3) UL (ref 0–1)
IMM GRANULOCYTES NFR BLD: 0.2 %
INR BLD: 1.08 (ref 0.8–1.2)
LYMPHOCYTES # BLD AUTO: 1.15 X10(3) UL (ref 1–4)
LYMPHOCYTES NFR BLD AUTO: 20.9 %
MAGNESIUM SERPL-MCNC: 2.1 MG/DL (ref 1.6–2.6)
MCH RBC QN AUTO: 31.7 PG (ref 26–34)
MCHC RBC AUTO-ENTMCNC: 33.3 G/DL (ref 31–37)
MCV RBC AUTO: 95.2 FL
MONOCYTES # BLD AUTO: 0.72 X10(3) UL (ref 0.1–1)
MONOCYTES NFR BLD AUTO: 13.1 %
NEUTROPHILS # BLD AUTO: 2.99 X10 (3) UL (ref 1.5–7.7)
NEUTROPHILS # BLD AUTO: 2.99 X10(3) UL (ref 1.5–7.7)
NEUTROPHILS NFR BLD AUTO: 54.5 %
OSMOLALITY SERPL CALC.SUM OF ELEC: 304 MOSM/KG (ref 275–295)
PHOSPHATE SERPL-MCNC: 8.6 MG/DL (ref 2.4–5.1)
PLATELET # BLD AUTO: 257 10(3)UL (ref 150–450)
POTASSIUM SERPL-SCNC: 4.1 MMOL/L (ref 3.5–5.1)
PROTHROMBIN TIME: 14.1 SECONDS (ref 11.6–14.8)
RBC # BLD AUTO: 3.15 X10(6)UL
SODIUM SERPL-SCNC: 141 MMOL/L (ref 136–145)
VANCOMYCIN SERPL-MCNC: 27.6 UG/ML (ref ?–40)
WBC # BLD AUTO: 5.5 X10(3) UL (ref 4–11)

## 2025-02-03 PROCEDURE — 36589 REMOVAL TUNNELED CV CATH: CPT | Performed by: RADIOLOGY

## 2025-02-03 PROCEDURE — 0JPT3XZ REMOVAL OF TUNNELED VASCULAR ACCESS DEVICE FROM TRUNK SUBCUTANEOUS TISSUE AND FASCIA, PERCUTANEOUS APPROACH: ICD-10-PCS | Performed by: RADIOLOGY

## 2025-02-03 PROCEDURE — 80069 RENAL FUNCTION PANEL: CPT | Performed by: INTERNAL MEDICINE

## 2025-02-03 PROCEDURE — 85610 PROTHROMBIN TIME: CPT | Performed by: INTERNAL MEDICINE

## 2025-02-03 PROCEDURE — 83735 ASSAY OF MAGNESIUM: CPT | Performed by: INTERNAL MEDICINE

## 2025-02-03 PROCEDURE — 87070 CULTURE OTHR SPECIMN AEROBIC: CPT | Performed by: RADIOLOGY

## 2025-02-03 PROCEDURE — 85025 COMPLETE CBC W/AUTO DIFF WBC: CPT | Performed by: INTERNAL MEDICINE

## 2025-02-03 PROCEDURE — 77001 FLUOROGUIDE FOR VEIN DEVICE: CPT | Performed by: RADIOLOGY

## 2025-02-03 PROCEDURE — 90935 HEMODIALYSIS ONE EVALUATION: CPT | Performed by: INTERNAL MEDICINE

## 2025-02-03 PROCEDURE — 05PY33Z REMOVAL OF INFUSION DEVICE FROM UPPER VEIN, PERCUTANEOUS APPROACH: ICD-10-PCS | Performed by: RADIOLOGY

## 2025-02-03 PROCEDURE — 80202 ASSAY OF VANCOMYCIN: CPT | Performed by: INTERNAL MEDICINE

## 2025-02-03 RX ORDER — LIDOCAINE HYDROCHLORIDE AND EPINEPHRINE BITARTRATE 20; .01 MG/ML; MG/ML
INJECTION, SOLUTION SUBCUTANEOUS
Status: COMPLETED
Start: 2025-02-03 | End: 2025-02-03

## 2025-02-03 RX ORDER — MIDAZOLAM HYDROCHLORIDE 1 MG/ML
INJECTION INTRAMUSCULAR; INTRAVENOUS
Status: DISCONTINUED
Start: 2025-02-03 | End: 2025-02-03 | Stop reason: WASHOUT

## 2025-02-03 RX ORDER — LIDOCAINE HYDROCHLORIDE 10 MG/ML
INJECTION, SOLUTION INFILTRATION; PERINEURAL
Status: COMPLETED
Start: 2025-02-03 | End: 2025-02-03

## 2025-02-03 RX ORDER — NALOXONE HYDROCHLORIDE 0.4 MG/ML
0.08 INJECTION, SOLUTION INTRAMUSCULAR; INTRAVENOUS; SUBCUTANEOUS AS NEEDED
Status: DISCONTINUED | OUTPATIENT
Start: 2025-02-03 | End: 2025-02-07

## 2025-02-03 NOTE — PROGRESS NOTES
[facility]   part of [facility] Hospitalist Progress Note     Godwin Fonseca Patient Status:  Inpatient    1980 MRN [id number]   Location [facility] 7NE-A Attending Jagdeep Joseph,    Hosp Day # 1 PCP Adrian Small MD     Assessment & Plan:    A/P: 43 y/o M w/ PMHx of ESRD MWF, HTN, Severe MR s/p MVR, HFpEF, Hx of Prior DVT/PE, TIA, Bipolar Disorder, Recurrent prior GI bleeds who presents to the hospital after missing 2 dialysis sessions, fevers, body aches     Missed Dialysis  Hx of ESRD MWF  Hyperphosphatemia  Plan:  - Nephrology consulted, planning to dialyze again today as per nursing staff  - Considering removal of HD catheter, defer to ID/Nephro on planning  - Continue home meds, Sevalamer increased by Nephro     Hypertensive Urgency - resolved  HTN  Plan:  - Resume home meds, Coreg, Clonidine, Hydralazine, Losartan, Nifedipine  - PRN Hydralazine     Recent Staph Epi bacteremia  Current Coronavirus URI  Repeat Bcx: ngtd  Plan:  - ID consulted, continue IV Vanc for now     BPD  - Resume home meds     BPH  - Flomax    DVT Ppx: SQH    Subjective:     Patient seen and examined this morning at bedside. Doing well, no acute complaints from overnight. Feeling better, wants to sleep.    Vital signs:  Temp:  [97.5 °F (36.4 °C)-98.4 °F (36.9 °C)] 98.4 °F (36.9 °C)  Pulse:  [] 85  Resp:  [15-28] 19  BP: (116-179)/() 153/94  SpO2:  [90 %-98 %] 94 %    Physical Exam:    General: No acute distress.   Respiratory: Clear to auscultation bilaterally. No wheezes.  Cardiovascular: S1, S2. Regular rate and rhythm  Abdomen: Soft, nontender, nondistended.  Positive bowel sounds. No rebound or guarding.  Extremities: No edema.  Neuro:  Grossly non focal, no motor deficits.      Diagnostic Data:    Pertinent Labs and Imaging independently reviewed  Comments:  Labs improving  HgB stable    Jagdeep Joseph D.O.  [facility] Hospitalist

## 2025-02-03 NOTE — PAYOR COMM NOTE
--------------  ADMISSION REVIEW   2/2-2/3  Payor: [employer]  Subscriber #:  [id number]  Authorization Number: [id number]    Admit date: 2/2/25  Admit time: 10:59 AM       REVIEW DOCUMENTATION:     ED Provider Notes        ED Provider Notes signed by Stacy Shane MD at 2/2/2025 10:01 AM       Author: Stacy Shane MD Service: Emergency Medicine Author Type: Physician    Filed: 2/2/2025 10:01 AM Date of Service: 2/2/2025  9:09 AM Status: Signed    : Stacy Shane MD (Physician)           Patient Seen in: [facility] Emergency Department      History     Chief Complaint   Patient presents with    Difficulty Breathing     Stated Complaint: LIZBETH    Subjective:   HPI      Patient here complaining of fevers chills body aches.  He has medical issues noted below.  ESRD on dialysis.  He is recently diagnosed with a line infection in his dialysis port in his right upper chest.  He states he missed last 2 sessions of dialysis and has been having fevers chills and bodyaches.      No nausea no vomiting no cough.  He has she denies shortness of breath.    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[facility])    Congestive heart disease ([facility])    COPD (chronic obstructive pulmonary disease) ([facility])    Coronary atherosclerosis    Deep vein thrombosis ([facility])    at age 17 R/T cast    Depression    Diabetes ([facility])    Dialysis patient    Diverticulosis of large intestine    Essential hypertension    3/21 echo: severe concentric LVH with normal EF and no MR or pHTN    Extrinsic asthma, unspecified    Heart attack ([facility])    2016- angiogram- no intervention    Heart valve disease    mitral valve repair in 1994/    High blood pressure    High cholesterol    History of blood transfusion     History of mitral valve repair    Hyperlipidemia    Low back pain    tight and stiff after sweeping and mopping    LVH (left ventricular hypertrophy)    Migraines    Mixed hyperlipidemia     HDL 38 LDL 97 VLDL 57     Monoclonal gammopathy    IgG kappa     Muscle weakness    MVP (mitral valve prolapse)    Repair 1994 at [place]; echoes as recently as 3/21 show mild or trivial MR and no stenosis    Neuropathy    Osteoarthritis    hip ,knees    Pneumonia due to organism    Pulmonary embolism ([facility])    Renal disorder    Stroke ([facility])    TIA (transient ischemic attack)    Initial history of left-sided weakness and slurred speech. (+) cocaine. MRI of the brain, CT angiogram of the head and neck, and 2D echo are all unremarkable.     TMJ (dislocation of temporomandibular joint)    Troponin level elevated    Trop 60 60 47 with TIA and no CP: Lexiscan negative with EF 51    Visual impairment     Physical Exam     ED Triage Vitals [02/02/25 0645]   BP (!) 176/126   Pulse 98   Resp 20   Temp 97.8 °F (36.6 °C)   Temp src Temporal   SpO2 98 %   O2 Device None (Room air)       Current Vitals:   Vital Signs  BP: (!) 173/129  Pulse: 90  Resp: 18  Temp: 97.8 °F (36.6 °C)  Temp src: Temporal  MAP (mmHg): (!) 154    Oxygen Therapy  SpO2: 100 %  O2 Device: None (Room air)        Physical Exam  Physical Exam   Constitutional: Awake, alert, well appearing  Head: Normocephalic and atraumatic.   Eyes: Conjunctivae are normal. Pupils are equal, round, and reactive to light.   Neck: Normal range of motion. No JVD  Cardiovascular: Normal rate, regular rhythm  Pulmonary/Chest: Normal effort.  No accessory muscle use.  No cyanosis.  Abdominal: Soft. Not distended.  Neurological: Pt is alert and oriented to person, place, and time. no cranial nerve deficits. Speech fluent      Port in place right chest    ED Course     Labs Reviewed   COMP METABOLIC PANEL (14) - Abnormal; Notable for the following components:       Result Value     Glucose 123 (*)     Anion Gap 20 (*)     BUN 68 (*)     Creatinine 15.70 (*)     Calcium, Total 8.4 (*)     Calculated Osmolality 309 (*)     eGFR-Cr 3 (*)     AST 45 (*)     All other components within normal limits   CBC WITH DIFFERENTIAL WITH PLATELET - Abnormal; Notable for the following components:    RBC 3.26 (*)     HGB 10.7 (*)     HCT 30.6 (*)     All other components within normal limits   LACTIC ACID, PLASMA - Normal   RAINBOW DRAW LAVENDER   RAINBOW DRAW LIGHT GREEN   RAINBOW DRAW BLUE   RAINBOW DRAW GOLD   BLOOD CULTURE   BLOOD CULTURE   Blood work reviewed, consistent with a [facility] CT says.  Blood cultures lactic acid drawn.  Lactic acid is fine  Prior records reviewed.  Staph infection in dialysis port, plan was for vancomycin dosing during dialysis.    Differential diagnoses considered: Port infection, bacteremia, less likely viral process, acute pulmonary edema, attempt hypertensive urgency all considered    -Patient missed the last 2 sessions of dialysis meaning he is also missed his antibiotics.  , the patient is pretty hypertensive, - does not  need emergent dialysis now.  his respiratory status is adequate his electrolytes are acceptable  He reported fevers and chills, concerned that his infection may not be adequately treated and he may be bacteremic and.  Blood cultures will plan on admission.      -Nephrology consult, ID consult          I visualized the radiology studies, my independent interpretation: No pulmonary edema noted on chest x-ray    *Discussion of ongoing management of this patient's care included: admitting physician, nephrology  *Comorbidities contributing to the complexity of decision making: Recent line infection, ESRD on dialysis,    *External charts reviewed: n/a  *Additional sources of history: n/a    Shared decision making was done by: patient, myself.      Admission disposition: 2/2/2025  9:07 AM      Disposition and Plan     Clinical Impression:  1. Fever, unspecified  fever cause         Disposition:  Admit  2/2/2025  9:07 am    Hospital Problems       Present on Admission  Date Reviewed: 1/18/2025            ICD-10-CM Noted POA    * (Principal) Fever, unspecified fever cause R50.9 2/2/2025 Unknown               2/2 H&P    History of Present Illness: This is the story of Mr. Raymundo Connelly who is a 43 y/o M w/ PMHx of ESRD MWF, HTN, Severe MR s/p MVR, HFpEF, Hx of Prior DVT/PE, TIA, Bipolar Disorder, Recurrent prior GI bleeds who presents to the hospital after missing 2 dialysis sessions, fevers, body aches. He was recently admitted to the hospital w/ staph epi bacteremia/viral URI. During that admission he was found to have a line infection and was discharged on IV Vanco until 2/1 as per last ID note. His last dose of IV abx was the last time he got dialysis which was on Monday. He states that the dialysis center has NOT been using his HD catheter as he has a LUE fistula. Patient states that he has been trying to transfer to [facility] dialysis which is 5 minutes from his house instead of his current dialysis center which is 20 minutes from his house. This distance is directly contributing to his recurrent readmissions.  ED Vitals: HTN to 190s-200s systolic  Labs:   Labs indicating need for HD  HgB 10.7    CXR: R pleural effusion  Patient was found to be positive for Coronavirus, not COVID19, which also appears he was positive for in January  Bcx were drawn  He received several of his BP medications in the ED along w/ IV dose of Hydralazine, IV Morphine, Fentanyl and Vanc. ID and Nephrology were both consulted and patient was admitted to the hospital for further evaluation.      A/P: 43 y/o M w/ PMHx of ESRD MWF, HTN, Severe MR s/p MVR, HFpEF, Hx of Prior DVT/PE, TIA, Bipolar Disorder, Recurrent prior GI bleeds who presents to the hospital after missing 2 dialysis sessions, fevers, body aches     Missed Dialysis  Hx of ESRD MWF  Hyperphosphatemia  Plan:  - Nephrology consulted,  planning to dialyze today  - Considering removal of HD catheter  - Continue home meds, Sevalamer increased by Nephro     Hypertensive Urgency  Plan:  - Resume home meds, Coreg, Clonidine, Hydralazine, Losartan, Nifedipine  - PRN Hydralazine     Recent Staph Epi bacteremia  Current Coronavirus URI  Plan:  - ID consulted, continue IV Vanc for now  - Repeat Bcx ordered     BPD  - Resume home meds     BPH  - Flomax        2/2 ID    Reason for Consultation:  Recent bacteremia, ongoing treatment     History of Present Illness:  Godwin Fonseca is a a(n) 44 year old male being seen at your request regarding recent bacteremia.  Patient was recently admitted 1/18/25 with fevers and staph epi bacteremia with concurrent viral URI.  Patient does have a HD catheter which was used previously for HD access.  He was ultimately discharged on continued IV vancomycin planned through 2/1/25 to complete a 2 week course.      Patient presented to the ED today with some LIZBETH and failure to attend last two HD sessions.  Not only did he miss HD, he missed recent doses of IV vancomycin as well.  He reported body aches and fevers as well as N/V and decreased p.o. intake.      At his current dialysis center he is being dialyzed through his fistula without HD catheter being used at all.  He was advised to have this removed.     Patient was given dose of IV vancomycin in the ED.  Blood cultures were obtained.  We are asked to see and assist.  Assessment and Plan:     Recent admission for fevers in the setting of acute seasonal coronavirus infection and with 2/2 blood cultures isolating CoNS on 1/18/25  - While this was in the setting of HD catheter, bacteremia cleared quickly  - Patient was discharged on 2 additional weeks of IV vancomycin dosed with HD through 2/1/25  - Repeat blood cultures once again     2.  ESRD on HD  - Patient presented with SOB and RLL effusion with missed HD sessions this week  - Patient has also missed doses of IV  vancomycin     3.  Multiple medical and psych issues as previous  - Supportive care     4.  Disposition - inpatient.  Given missed HD sessions, will restart IV vancomycin as well and continue while here.  Repeat blood cultures x 2 to assure no new breakthrough bacteremia.  As patient has been receiving HD via fistula, suggest removing HD catheter when able.  Check full RVP given new fevers.  Trending temps and WBCs.  Will follow with further recommendations.  D/w patient.        2/2 Nephrology    ESRD on HD  -- HD MWF per schedule, missed W and F, so will do HD today. Less UF given vomiting and unable to tolerate much po. Will try to use AVF and see if can remove catheter if it works or if needed from ID standpoint.      Anemia  -- epo prn hgb < 10      MBD / Hyperphosphatemia  -- increased sevelamer to 2400 TID AC  -- ca acetate 667 tid ac  -- reiterated low phos diet     HTN:  -- pt takes coreg and losartan pre-HD  -- takes remaining hypertensives pre-HD if systolic > 180 but should take clonidine consistently to avoid rebound     Fevers / recent bacteremia  -- ID consult. Abx per ID. Await ID recs re removing line as well           2/3    A/P: 45 y/o M w/ PMHx of ESRD MWF, HTN, Severe MR s/p MVR, HFpEF, Hx of Prior DVT/PE, TIA, Bipolar Disorder, Recurrent prior GI bleeds who presents to the hospital after missing 2 dialysis sessions, fevers, body aches     Missed Dialysis  Hx of ESRD MWF  Hyperphosphatemia  Plan:  - Nephrology consulted, planning to dialyze again today as per nursing staff  - Considering removal of HD catheter, defer to ID/Nephro on planning  - Continue home meds, Sevalamer increased by Nephro     Hypertensive Urgency - resolved  HTN  Plan:  - Resume home meds, Coreg, Clonidine, Hydralazine, Losartan, Nifedipine  - PRN Hydralazine     Recent Staph Epi bacteremia  Current Coronavirus URI  Repeat Bcx: ngtd  Plan:  - ID consulted, continue IV Vanc for now     BPD  - Resume home meds     BPH  -  Flomax     DVT Ppx: SQH        Subjective:  Patient seen and examined this morning at bedside. Doing well, no acute complaints from overnight. Feeling better, wants to sleep.     Vital signs:  Temp:  [97.5 °F (36.4 °C)-98.4 °F (36.9 °C)] 98.4 °F (36.9 °C)  Pulse:  [] 85  Resp:  [15-28] 19  BP: (116-179)/() 153/94  SpO2:  [90 %-98 %] 94 %     Physical Exam:    General: No acute distress.   Respiratory: Clear to auscultation bilaterally. No wheezes.  Cardiovascular: S1, S2. Regular rate and rhythm  Abdomen: Soft, nontender, nondistended.  Positive bowel sounds. No rebound or guarding.  Extremities: No edema.  Neuro:  Grossly non focal, no motor deficits.       Diagnostic Data:    Pertinent Labs and Imaging independently reviewed  Comments:  Labs improving  HgB stable          2/3 ID  . Reports his body aches and chills have stopped. Still feels some SOB compared to baseline but better. Afebrile since admission.         Vancomycin: PHARMACY DOSING, 1 each, Intravenous, See Admin Instructions (RX holding)        Physical Exam:  General: Alert, orientated x3.  Cooperative.  No apparent distress.  Vital Signs:  Blood pressure (!) 153/94, pulse 85, temperature 98.4 °F (36.9 °C), temperature source Oral, resp. rate 19, weight 246 lb (111.6 kg), SpO2 94%.   Temp (24hrs), Av.1 °F (36.7 °C), Min:97.5 °F (36.4 °C), Max:98.4 °F (36.9 °C)        HEENT: Exam is unremarkable.  Without scleral icterus.  Mucous membranes are moist. PERRLA.  Oropharynx is clear.  Lungs: Clear to auscultation bilaterally.  Cardiac: Regular rate   Abdomen:  Soft, non-distended, non-tender, with no rebound or guarding.     Extremities:  No lower extremity edema noted.  Without clubbing or cyanosis.    Skin: Normal texture and turgor.  Neurologic: Cranial nerves are grossly intact.       Labs:        Lab Results   Component Value Date     WBC 5.5 2025     HGB 10.0 2025     HCT 30.0 2025     .0 2025      CREATSERUM 10.61 02/03/2025     BUN 46 02/03/2025      02/03/2025     K 4.1 02/03/2025     CL 99 02/03/2025     CO2 28.0 02/03/2025      02/03/2025     CA 7.7 02/03/2025     ALB 3.9 02/03/2025     MG 2.1 02/03/2025     PHOS 8.6 02/03/2025         Radiology:  CXR:  Impression   CONCLUSION:  There is blunting right costophrenic angle consistent with right effusion.         Assessment/Plan:     1.  Recent admission with CoNS bacteremia  -admitted with viral infection and found to have 2/2 blood cultures with CoNS on 1/18/25  -in setting of HD catheter in place  -cleared bacteremia quickly on 1/19 and was DC on IV Vancomycin with HD through 2/1/25  -now admitted after missing 2 HD's sessions and reporting body aches, fevers, SOB and n/v   -repeat blood cultures 2/2/25 NGTD  -repeat RVP with seasonal coronavirus again  -no fevers since admission, no leukocytosis   -on IV Vancomycin      2. ESRD  -on HD; as above missed two HD sessions  -CXR with RLL effusion   -recommend HD cath removal given hx of CoNS bacteremia   -per nephrology           DISPO: Continue IV Vancomycin and follow up repeat blood cultures sent 2/2/25.  Follow temps.  Suggest removing HD catheter when able.  Will continue to follow along.  D/w patient bedside.             2/3 Nephrology    ESRD on HD  -- HD MWF per schedule, missed W and F, so did HD 2/2/25. Will plan HD again today to get back on MWF schedule, 3K, UF as toelrated. Per pt outpt nephrologist wants to get permcath removed, and he states at his outpt dialysis clinic they use the AVF w no issues, and do not use the catheter. We did use the AVF 2/2/25. If no issues cannulating and good BFR then should not need perm cath. ID recs also remove permcath. Will ask IR to remove permcath     Anemia  -- epo prn hgb < 10      MBD / Hyperphosphatemia  -- increased sevelamer to 2400 TID AC  -- ca acetate 667 tid ac  -- reiterated low phos diet     HTN:  -- pt takes coreg and losartan  pre-HD  -- takes remaining hypertensives pre-HD if systolic > 180 but should take clonidine consistently to avoid rebound     Fevers / recent bacteremia  -- ID consult. Abx per ID. Await ID recs re removing line as well       MEDICATIONS ADMINISTERED IN LAST 1 DAY:  buPROPion ER (Wellbutrin XL) 24 hr tab 150 mg       Date Action Dose Route User    2/3/2025 1009 Given 150 mg Oral Maria Ines Nicolas RN    2/2/2025 1821 Given 150 mg Oral Meera Bonilla RN          calcium acetate (Phoslo) cap 667 mg       Date Action Dose Route User    2/2/2025 1620 Given 667 mg Oral Meera Bonilla RN          carvedilol (Coreg) tab 25 mg       Date Action Dose Route User    2/3/2025 1009 Given 25 mg Oral Maria Ines Nicolas RN    2/2/2025 1951 Given 25 mg Oral Meera Bonilla RN          FLUoxetine (PROzac) cap 40 mg       Date Action Dose Route User    2/3/2025 1009 Given 40 mg Oral Maria Ines Nicolas RN    2/2/2025 1820 Given 40 mg Oral Meera Bonilla RN          gabapentin (Neurontin) cap 200 mg       Date Action Dose Route User    2/2/2025 1820 Given 200 mg Oral Meera Bonilla RN          heparin (Porcine) 1000 UNIT/ML injection 1,500 Units       Date Action Dose Route User    2/2/2025 1602 Given 1,500 Units Intracatheter Meera Bonilla RN          heparin (Porcine) 5000 UNIT/ML injection 5,000 Units       Date Action Dose Route User    2/3/2025 0540 Given 5,000 Units Subcutaneous (Left Upper Abdomen) Jackie Hernández RN    2/2/2025 2117 Given 5,000 Units Subcutaneous (Left Lower Abdomen) Jackie Hernández RN          hydrALAZINE (Apresoline) tab 25 mg       Date Action Dose Route User    2/3/2025 1009 Given 25 mg Oral Maria Ines Nicolas RN          HYDROcodone-acetaminophen (Norco)  MG per tab 1 tablet       Date Action Dose Route User    2/2/2025 2116 Given 1 tablet Oral Jackie Hernández RN          lamoTRIgine (LaMICtal) tab 100 mg       Date Action Dose Route User    2/3/2025 1009 Given 100 mg Oral Maria Isabel  JASON Spann    2/2/2025 1821 Given 100 mg Oral Meera Bonilla RN          losartan (Cozaar) tab 100 mg       Date Action Dose Route User    2/3/2025 1009 Given 100 mg Oral Maria Ines Nicolas RN          NIFEdipine ER (Procardia-XL) 24 hr tab 30 mg       Date Action Dose Route User    2/3/2025 1009 Given 30 mg Oral Maria Ines Nicolas RN          sevelamer carbonate (Renvela) tab 2,400 mg       Date Action Dose Route User    2/2/2025 1620 Given 2,400 mg Oral Meera Bonilla RN          tamsulosin (Flomax) cap 0.4 mg       Date Action Dose Route User    2/3/2025 1009 Given 0.4 mg Oral Maria Ines Nicolas RN    2/2/2025 1821 Given 0.4 mg Oral Meera Bonilla RN          ARIPiprazole (Abilify) tab 15 mg       Date Action Dose Route User    2/3/2025 1009 Given 15 mg Oral Maria Ines Nicolas RN    2/2/2025 1820 Given 15 mg Oral Meera Bonilla RN            Vitals (last day)       Date/Time Temp Pulse Resp BP SpO2 Weight O2 Device O2 Flow Rate (L/min) Who    02/03/25 1235 97.7 °F (36.5 °C) 79 18 134/97 -- -- None (Room air) -- SB    02/03/25 0820 98.4 °F (36.9 °C) 85 19 153/94 94 % -- None (Room air) -- SB    02/03/25 0544 97.5 °F (36.4 °C) 83 19 120/74 95 % -- None (Room air) -- VO    02/03/25 0000 98.1 °F (36.7 °C) 92 15 139/93 93 % -- None (Room air) -- VO    02/02/25 2130 -- -- -- -- 90 % -- Nasal cannula 2 L/min     02/02/25 2117 -- -- -- 116/70 -- -- -- -- KK    02/02/25 2000 98 °F (36.7 °C) 100 28 148/72 92 % -- None (Room air) -- VO    02/02/25 1600 98.3 °F (36.8 °C) 83 24 -- 91 % -- -- -- FL    02/02/25 1128 -- -- -- -- -- 246 lb (111.6 kg) -- -- ID    02/02/25 1110 98.3 °F (36.8 °C) 95 26 179/109 91 % -- None (Room air) -- ET    02/02/25 1030 -- 97 20 159/95 98 % -- None (Room air) -- MG    02/02/25 1000 -- 101 20 120/99 95 % -- -- -- MG    02/02/25 0937 -- 90 18 173/129 100 % -- None (Room air) -- MG    02/02/25 0845 -- 96 -- 200/136 100 % -- -- -- MG    02/02/25 0815 -- 90 19 -- 97 % -- -- -- MG    02/02/25 0753  -- -- -- -- -- -- None (Room air) -- MG    02/02/25 0745 -- 97 29 190/124 100 % -- -- -- MG    02/02/25 0715 -- 97 22 189/116 99 % -- None (Room air) -- MG    02/02/25 0645 97.8 °F (36.6 °C) 98 20 176/126 98 % 246 lb 14.6 oz (112 kg) None (Room air) -- CK          CIWA Scores (since admission)       None

## 2025-02-03 NOTE — PROCEDURES
[facility]    Godwin Fonseca Patient Status:  Inpatient    1980 MRN [id number]   Location [facility] INTERVENTIONAL SUITES Attending Jagdeep Joseph,    Hosp Day # 1 PCP Adrian Small MD         Brief Procedure Report    Pre-Operative Diagnosis: Bacteremia    Post-Operative Diagnosis: Same as above.    Procedure Performed: Right EJ tunneled permcath removal    Anesthesia: 1% lidocaine    EBL: 0    Complications: None    Summary of Case: Right E J tunneled permcath removed intoto. Cath tip was sent for routine cultures. Patient tolerated procedure well without immediate complication. Full report to follow in PACS.    Wilton Sharp

## 2025-02-03 NOTE — PLAN OF CARE
Pt received on room air. Desats with sleep, placed on 2 L nc.   Refused gabapentin  Tolerating po intake  Pt states he urinates sometimes.   Norco, heat pack prn chronic neck pain

## 2025-02-03 NOTE — PROCEDURES
[facility]  Pre-Procedure Note    Name: Godwin Fonseca  MRN#: [id number]  : 1980    Procedure:  Permcath removal    Indication: Bacteremia    Allergies:    Allergies[1]    Pertinent Medications:    Is patient on any Aspirin, Coumadin, or any other Anticoagulations/Antiplatelet medications?  no      Mental Status:  Alert and Oriented      Health Status: Acceptable for Procedure    Impression and Plans:    Bacteremia.  Tunneled Right EJ cath in place.  Request for cath removal.  Has a functioning AVF.    I have reviewed the above information prior to procedure.    I have discussed the risks and benefits and alternatives with the patient.  The patient understands and agrees to proceed with plan of care.    Wilton Sharp MD                 [1]   Allergies  Allergen Reactions    Hydrochlorothiazide RASH and HIVES    Fentanyl ITCHING and NAUSEA ONLY

## 2025-02-03 NOTE — PROGRESS NOTES
[facility]   part of [facility] Infectious Disease Progress Note    Godwin Fonseca Patient Status:  Inpatient    1980 MRN [id number]   [facility] 7NE-A Attending Jagdeep Joseph, DO   Hosp Day # 1 PCP Adrian Small MD     Subjective:  Chart reviewed, no acute events.  Pt seen bedside. He reports he is tired.   Reports his body aches and chills have stopped.  Still feels some SOB compared to baseline but better.  Afebrile since admission.      Objective:    Allergies:  Allergies[1]    Medications:    Current Facility-Administered Medications:     albuterol (Ventolin HFA) 108 (90 Base) MCG/ACT inhaler 2 puff, 2 puff, Inhalation, Q6H PRN    ARIPiprazole (Abilify) tab 15 mg, 15 mg, Oral, Daily    buPROPion ER (Wellbutrin XL) 24 hr tab 150 mg, 150 mg, Oral, Daily    calcium acetate (Phoslo) cap 667 mg, 667 mg, Oral, TID CC    carvedilol (Coreg) tab 25 mg, 25 mg, Oral, BID with meals    cloNIDine (Catapres) tab 0.1 mg, 0.1 mg, Oral, Nightly    FLUoxetine (PROzac) cap 40 mg, 40 mg, Oral, Daily    gabapentin (Neurontin) cap 200 mg, 200 mg, Oral, TID    hydrALAZINE (Apresoline) tab 25 mg, 25 mg, Oral, TID    HYDROcodone-acetaminophen (Norco)  MG per tab 1 tablet, 1 tablet, Oral, Q6H PRN    lamoTRIgine (LaMICtal) tab 100 mg, 100 mg, Oral, Daily    losartan (Cozaar) tab 100 mg, 100 mg, Oral, Daily    NIFEdipine ER (Procardia-XL) 24 hr tab 30 mg, 30 mg, Oral, Daily    sevelamer carbonate (Renvela) tab 2,400 mg, 2,400 mg, Oral, TID CC    tamsulosin (Flomax) cap 0.4 mg, 0.4 mg, Oral, Daily    heparin (Porcine) 5000 UNIT/ML injection 5,000 Units, 5,000 Units, Subcutaneous, Q8H MARTHA    melatonin tab 3 mg, 3 mg, Oral, Nightly PRN    polyethylene glycol (PEG 3350) (Miralax) 17 g oral packet 17 g, 17 g, Oral, Daily PRN    sennosides (Senokot) tab 17.2 mg, 17.2 mg, Oral, Nightly PRN    bisacodyl (Dulcolax) 10 MG rectal suppository 10 mg, 10 mg, Rectal, Daily PRN    ondansetron (Zofran) 4 MG/2ML  injection 4 mg, 4 mg, Intravenous, Q6H PRN    prochlorperazine (Compazine) 10 MG/2ML injection 5 mg, 5 mg, Intravenous, Q8H PRN    diphenhydrAMINE (Benadryl) cap/tab 25 mg, 25 mg, Oral, Q6H PRN    sodium chloride 0.9 % IV bolus 100 mL, 100 mL, Intravenous, Q30 Min PRN **AND** albumin human (Albumin) 25% injection 25 g, 25 g, Intravenous, PRN Dialysis    Vancomycin: PHARMACY DOSING, 1 each, Intravenous, See Admin Instructions (RX holding)    Physical Exam:  General: Alert, orientated x3.  Cooperative.  No apparent distress.  Vital Signs:  Blood pressure (!) 153/94, pulse 85, temperature 98.4 °F (36.9 °C), temperature source Oral, resp. rate 19, weight 246 lb (111.6 kg), SpO2 94%.   Temp (24hrs), Av.1 °F (36.7 °C), Min:97.5 °F (36.4 °C), Max:98.4 °F (36.9 °C)      HEENT: Exam is unremarkable.  Without scleral icterus.  Mucous membranes are moist. PERRLA.  Oropharynx is clear.  Lungs: Clear to auscultation bilaterally.  Cardiac: Regular rate   Abdomen:  Soft, non-distended, non-tender, with no rebound or guarding.     Extremities:  No lower extremity edema noted.  Without clubbing or cyanosis.    Skin: Normal texture and turgor.  Neurologic: Cranial nerves are grossly intact.      Labs:  Lab Results   Component Value Date    WBC 5.5 2025    HGB 10.0 2025    HCT 30.0 2025    .0 2025    CREATSERUM 10.61 2025    BUN 46 2025     2025    K 4.1 2025    CL 99 2025    CO2 28.0 2025     2025    CA 7.7 2025    ALB 3.9 2025    MG 2.1 2025    PHOS 8.6 2025       Radiology:  CXR:  Impression   CONCLUSION:  There is blunting right costophrenic angle consistent with right effusion.       Assessment/Plan:    1.  Recent admission with CoNS bacteremia  -admitted with viral infection and found to have 2/2 blood cultures with CoNS on 25  -in setting of HD catheter in place  -cleared bacteremia quickly on  and was DC  on IV Vancomycin with HD through 2/1/25  -now admitted after missing 2 HD's sessions and reporting body aches, fevers, SOB and n/v   -repeat blood cultures 2/2/25 NGTD  -repeat RVP with seasonal coronavirus again  -no fevers since admission, no leukocytosis   -on IV Vancomycin     2. ESRD  -on HD; as above missed two HD sessions  -CXR with RLL effusion   -recommend HD cath removal given hx of CoNS bacteremia   -per nephrology        DISPO: Continue IV Vancomycin and follow up repeat blood cultures sent 2/2/25.  Follow temps.  Suggest removing HD catheter when able.  Will continue to follow along.  D/w patient bedside.     If you have any questions or concerns please call [facility] at [phone].     MARLEE Ashraf  2/3/2025  10:39 AM         [1]   Allergies  Allergen Reactions    Hydrochlorothiazide RASH and HIVES    Fentanyl ITCHING and NAUSEA ONLY

## 2025-02-03 NOTE — CM/SW NOTE
SEFERINO consult regarding outpatient HD. SEFERINO called pt's outpatient HD Clinic ([facility] [phone]) to speak with SW. Notified that HD Laila GENTILE is at [facility]. SEFERINO called [facility] [phone], notified [facility] of pt admission and inquired on process of switching clinics. Laila states as pt now admitted, will follow up with  regarding transfer of services.     SEFERINO following.     JOSH Haynes

## 2025-02-03 NOTE — PROGRESS NOTES
[facility]   part of [facility]    Nephrology Progress Note    Godwin Fonseca Attending:  Jagdeep Joseph DO     Cc: ESRD    SUBJECTIVE     Sp HD yesterday, tolerated well 2L removed. Used fistula.   No vomiting. No other complaints    PHYSICAL EXAM   Vital signs: BP (!) 134/97 (BP Location: Right arm)   Pulse 79   Temp 97.7 °F (36.5 °C) (Oral)   Resp 18   Wt 246 lb (111.6 kg)   SpO2 94%   BMI 31.58 kg/m²   Temp (24hrs), Av °F (36.7 °C), Min:97.5 °F (36.4 °C), Max:98.4 °F (36.9 °C)       Intake/Output Summary (Last 24 hours) at 2/3/2025 1257  Last data filed at 2/3/2025 0544  Gross per 24 hour   Intake 390 ml   Output 500 ml   Net -110 ml     Wt Readings from Last 3 Encounters:   25 246 lb (111.6 kg)   25 246 lb 14.6 oz (112 kg)   25 244 lb 11.4 oz (111 kg)     General: NAD  HEENT: NCAT, EOMI, MMM  Neck: Supple   Cardiac: Regular rate and rhythm   Lungs: CTAB  Abdomen: Soft, non-tender, nondistended   Extremities: No edema  Neurologic: No asterxis  Skin: Warm and dry, no rashes     MEDS     Current Facility-Administered Medications   Medication Dose Route Frequency    albuterol (Ventolin HFA) 108 (90 Base) MCG/ACT inhaler 2 puff  2 puff Inhalation Q6H PRN    ARIPiprazole (Abilify) tab 15 mg  15 mg Oral Daily    buPROPion ER (Wellbutrin XL) 24 hr tab 150 mg  150 mg Oral Daily    calcium acetate (Phoslo) cap 667 mg  667 mg Oral TID CC    carvedilol (Coreg) tab 25 mg  25 mg Oral BID with meals    cloNIDine (Catapres) tab 0.1 mg  0.1 mg Oral Nightly    FLUoxetine (PROzac) cap 40 mg  40 mg Oral Daily    gabapentin (Neurontin) cap 200 mg  200 mg Oral TID    hydrALAZINE (Apresoline) tab 25 mg  25 mg Oral TID    HYDROcodone-acetaminophen (Norco)  MG per tab 1 tablet  1 tablet Oral Q6H PRN    lamoTRIgine (LaMICtal) tab 100 mg  100 mg Oral Daily    losartan (Cozaar) tab 100 mg  100 mg Oral Daily    NIFEdipine ER (Procardia-XL) 24 hr tab 30 mg  30 mg Oral Daily    sevelamer carbonate  (Renvela) tab 2,400 mg  2,400 mg Oral TID CC    tamsulosin (Flomax) cap 0.4 mg  0.4 mg Oral Daily    heparin (Porcine) 5000 UNIT/ML injection 5,000 Units  5,000 Units Subcutaneous Q8H MARTHA    melatonin tab 3 mg  3 mg Oral Nightly PRN    polyethylene glycol (PEG 3350) (Miralax) 17 g oral packet 17 g  17 g Oral Daily PRN    sennosides (Senokot) tab 17.2 mg  17.2 mg Oral Nightly PRN    bisacodyl (Dulcolax) 10 MG rectal suppository 10 mg  10 mg Rectal Daily PRN    ondansetron (Zofran) 4 MG/2ML injection 4 mg  4 mg Intravenous Q6H PRN    prochlorperazine (Compazine) 10 MG/2ML injection 5 mg  5 mg Intravenous Q8H PRN    diphenhydrAMINE (Benadryl) cap/tab 25 mg  25 mg Oral Q6H PRN    sodium chloride 0.9 % IV bolus 100 mL  100 mL Intravenous Q30 Min PRN    And    albumin human (Albumin) 25% injection 25 g  25 g Intravenous PRN Dialysis    Vancomycin: PHARMACY DOSING  1 each Intravenous See Admin Instructions (RX holding)       LABS     Lab Results   Component Value Date    WBC 5.5 02/03/2025    HGB 10.0 02/03/2025    HCT 30.0 02/03/2025    .0 02/03/2025    CREATSERUM 10.61 02/03/2025    BUN 46 02/03/2025     02/03/2025    K 4.1 02/03/2025    CL 99 02/03/2025    CO2 28.0 02/03/2025     02/03/2025    CA 7.7 02/03/2025    ALB 3.9 02/03/2025    MG 2.1 02/03/2025    PHOS 8.6 02/03/2025       IMAGING   All imaging studies personally reviewed.    XR CHEST AP PORTABLE  (CPT=71045)   Final Result   PROCEDURE:  XR CHEST AP PORTABLE  (CPT=71045)       TECHNIQUE:  AP chest radiograph was obtained.       COMPARISON:  [facility] , XR, XR CHEST AP PORTABLE  (CPT=71045), 1/18/2025,    10:06 AM.       INDICATIONS:  LIZBETH       PATIENT STATED HISTORY: (As transcribed by Technologist)  Patient offered    no additional history at this time.             FINDINGS:  Right hemodialysis catheter is stable with distal tip in region    of SVC.  There is interval improvement in parenchymal opacities in both    lungs which is probably  improved edema or pneumonia.  There is blunting at    the right costophrenic angle    which could represent small right effusion.  Heart size is within normal    limits.  Valvular prosthesis is stable.  Mediastinum and elenita are stable.                         =====   CONCLUSION:  There is blunting right costophrenic angle consistent with    right effusion.           LOCATION:  [facility]                       Dictated by (CST): Adrian Blevins MD on 2/02/2025 at 8:16 AM        Finalized by (CST): Adrian Blevins MD on 2/02/2025 at 8:17 AM         IR CENTRAL VENOUS ACCESS    (Results Pending)         ASSESSMENT & PLAN   44 year old  male w ho ESRD on HD MWF with LUE AVF (but also still have RIJ tunnelled HD catheter), HTN, severe MR s/p MVR x2, HFpEF, DVT/PE, TIA, MGUS, bipolar disorder, recurrent GI bleed who presents with missed HD treatment x 2, fever, body aches.       ESRD on HD  -- HD MWF per schedule, missed W and F, so did HD 2/2/25. Will plan HD again today to get back on MWF schedule, 3K, UF as toelrated. Per pt outpt nephrologist wants to get permcath removed, and he states at his outpt dialysis clinic they use the AVF w no issues, and do not use the catheter. We did use the AVF 2/2/25. If no issues cannulating and good BFR then should not need perm cath. ID recs also remove permcath. Will ask IR to remove permcath     Anemia  -- epo prn hgb < 10      MBD / Hyperphosphatemia  -- increased sevelamer to 2400 TID AC  -- ca acetate 667 tid ac  -- reiterated low phos diet     HTN:  -- pt takes coreg and losartan pre-HD  -- takes remaining hypertensives pre-HD if systolic > 180 but should take clonidine consistently to avoid rebound     Fevers / recent bacteremia  -- ID consult. Abx per ID. Await ID recs re removing line as well     D/w RN    Thank you for allowing me to participate in the care of this patient. Please do not hesitate to call with questions or concerns.       Samaria Nava MD  [facility]  Nephrology

## 2025-02-04 LAB
ALBUMIN SERPL-MCNC: 4.1 G/DL (ref 3.2–4.8)
ANION GAP SERPL CALC-SCNC: 13 MMOL/L (ref 0–18)
BUN BLD-MCNC: 29 MG/DL (ref 9–23)
CALCIUM BLD-MCNC: 8.3 MG/DL (ref 8.7–10.6)
CHLORIDE SERPL-SCNC: 99 MMOL/L (ref 98–112)
CO2 SERPL-SCNC: 26 MMOL/L (ref 21–32)
CREAT BLD-MCNC: 7.33 MG/DL
EGFRCR SERPLBLD CKD-EPI 2021: 9 ML/MIN/1.73M2 (ref 60–?)
GLUCOSE BLD-MCNC: 110 MG/DL (ref 70–99)
MAGNESIUM SERPL-MCNC: 2.1 MG/DL (ref 1.6–2.6)
OSMOLALITY SERPL CALC.SUM OF ELEC: 292 MOSM/KG (ref 275–295)
PHOSPHATE SERPL-MCNC: 5.8 MG/DL (ref 2.4–5.1)
POTASSIUM SERPL-SCNC: 3.9 MMOL/L (ref 3.5–5.1)
SODIUM SERPL-SCNC: 138 MMOL/L (ref 136–145)

## 2025-02-04 PROCEDURE — 80069 RENAL FUNCTION PANEL: CPT | Performed by: INTERNAL MEDICINE

## 2025-02-04 PROCEDURE — 83735 ASSAY OF MAGNESIUM: CPT | Performed by: INTERNAL MEDICINE

## 2025-02-04 PROCEDURE — 94760 N-INVAS EAR/PLS OXIMETRY 1: CPT

## 2025-02-04 NOTE — DIETARY NOTE
[facility]   part of [facility]   CLINICAL NUTRITION    Godwin Fonseca     Admitting diagnosis:  Fever, unspecified fever cause [R50.9]    Ht:  6'2\"  Wt: 111.6 kg (246 lb).   Body mass index is 31.58 kg/m².  IBW: 80.9 kg 178 lbs    Wt Readings from Last 6 Encounters:   02/02/25 111.6 kg (246 lb)   01/22/25 112 kg (246 lb 14.6 oz)   01/07/25 111 kg (244 lb 11.4 oz)   01/06/25 111.1 kg (245 lb)   12/26/24 108.9 kg (240 lb)   12/17/24 108.9 kg (240 lb)        Labs/Meds reviewed    Diet:       Procedures    Renal diet Renal; Sodium Restriction: 2 GM NA; Is Patient on Accuchecks? No     Percent Meals Eaten (last 3 days)       Date/Time Percent Meals Eaten (%)    02/02/25 2200 100 %    02/03/25 0110 100 %    02/03/25 0900 0 %     Percent Meals Eaten (%): did not want breakfast at 02/03/25 0900    02/03/25 1700 100 %     Percent Meals Eaten (%): Meatloaf, green beans, cream of chicken soup, grapes, gunnar food cake at 02/03/25 1700              Pt chart reviewed d/t at risk consult.  Patient reports good appetite at this time.  Nursing notes reports Percent Meals Eaten (%): 100 % (Meatloaf, green beans, cream of chicken soup, grapes, gunnar food cake) intake for last meal.  Tolerating po diet without diarrhea, emesis, or constipation.   No significant weight changes noted.     2/4/25- Pt states that prior to admission his appetite was poor, but has improved since. He says he does not think it had anything to do w missing 2 dialysis appointments because the low appetite started before that. States that he would feel full fast after beginning to eat but could not think of why. Was open to trying a Nepro shake. States that usual wt is 245-250 which is consistent w his current wt of 246. Very sleepy during visit. No n/v/d. Last BM ~3am. No wt concerns and has been eating more consistently since admission. Will follow to monitor PO and ONS intake. All questions answered at this time.     PMH: BPD, T2D, CAD, ESRD,  bacteremia    Patient is at low nutrition risk at this time.    Please consult if patient status changes or nutrition issues arise.    Ghislaine Monreal  Dietetic Intern

## 2025-02-04 NOTE — PROGRESS NOTES
[facility] Pharm[facility] Dosing Service      Follow Up Pharmacokinetic Consult for Vancomycin Dosing     Godwin Fonseca is a 44 year old male who is receiving vancomycin therapy for ine infection from previous admission .  Patient is on day 3 of vancomycin and is currently receiving   Vancomycin per levels . The current treatment and monitoring approach is non-AUC strategy.        Weight and Temperature:    Wt Readings from Last 1 Encounters:   25 111.6 kg (246 lb)         Temp Readings from Last 1 Encounters:   25 98.3 °F (36.8 °C) (Oral)      Labs:   Recent Labs   Lab 25  0702 25  0554 25  0634   CREATSERUM 15.70* 10.61* 7.33*      Estimated Creatinine Clearance: 14.6 mL/min (A) (based on SCr of 7.33 mg/dL (H)).     Recent Labs   Lab 25  0702 25  0554   WBC 8.4 5.5        Vancomycin Levels:  Lab Results   Component Value Date/Time    VANCR 27.6 2025 07:20 PM       Corresponding 24 h-AUC: N/A     The Pharmacokinetic Target is:      Trough/random 15-20 mg/L (applies to IV dosing in HD and IP dosing in APD)    Renal Dosing Considerations:    HD: Home regimen: M/W/F; HD today /     Hold Vanco on 2/3 random level=27.6     Assessment/Plan:     Maintenance Regimen:  Vancomycin per level    Monitorin) No Vancomycin today. No dialysis today . Plan for vancomycin random level to be obtained tomorrow     2) Pharmacy will order SCr as clinically indicated to assess renal function.    3) Pharmacy will monitor for toxicity and efficacy, adjust vancomycin dose and/or frequency, and order vancomycin levels as appropriate per the Pharmacy and Therapeutics Committee approved protocol until discontinuation of the medication.       We appreciate the opportunity to assist in the care of this patient.     Belén Lozada [facility]  2025  3:08 PM  [facility]  Pharmacy Extension: [phone]

## 2025-02-04 NOTE — CM/SW NOTE
SW attempted to meet with pt at bedside, however currently resting and asked for SW to come back. SW asked pt to notify RN when he is available.     JOSH Haynes       Alert and oriented to person, place, time/situation. normal mood and affect. +SI

## 2025-02-04 NOTE — PROGRESS NOTES
[facility]   part of [facility] Infectious Disease Progress Note    Godwin Fonseca Patient Status:  Inpatient    1980 MRN [id number]   Location [facility] 7NE-A Attending Jagdeep Joseph, DO   Hosp Day # 2 PCP Adrian Small MD     Subjective:  Chart reviewed, no acute events. HD cath removed yesterday in IR.  Pt seen bedside.  He continues to report he is tired but no focal complaints or symptoms.  Afebrile     Objective:    Allergies:  Allergies[1]    Medications:    Current Facility-Administered Medications:     naloxone (Narcan) 0.4 MG/ML injection 0.08 mg, 0.08 mg, Intravenous, PRN    albuterol (Ventolin HFA) 108 (90 Base) MCG/ACT inhaler 2 puff, 2 puff, Inhalation, Q6H PRN    ARIPiprazole (Abilify) tab 15 mg, 15 mg, Oral, Daily    buPROPion ER (Wellbutrin XL) 24 hr tab 150 mg, 150 mg, Oral, Daily    calcium acetate (Phoslo) cap 667 mg, 667 mg, Oral, TID CC    carvedilol (Coreg) tab 25 mg, 25 mg, Oral, BID with meals    cloNIDine (Catapres) tab 0.1 mg, 0.1 mg, Oral, Nightly    FLUoxetine (PROzac) cap 40 mg, 40 mg, Oral, Daily    gabapentin (Neurontin) cap 200 mg, 200 mg, Oral, TID    hydrALAZINE (Apresoline) tab 25 mg, 25 mg, Oral, TID    HYDROcodone-acetaminophen (Norco)  MG per tab 1 tablet, 1 tablet, Oral, Q6H PRN    lamoTRIgine (LaMICtal) tab 100 mg, 100 mg, Oral, Daily    losartan (Cozaar) tab 100 mg, 100 mg, Oral, Daily    NIFEdipine ER (Procardia-XL) 24 hr tab 30 mg, 30 mg, Oral, Daily    sevelamer carbonate (Renvela) tab 2,400 mg, 2,400 mg, Oral, TID CC    tamsulosin (Flomax) cap 0.4 mg, 0.4 mg, Oral, Daily    heparin (Porcine) 5000 UNIT/ML injection 5,000 Units, 5,000 Units, Subcutaneous, Q8H MARTHA    melatonin tab 3 mg, 3 mg, Oral, Nightly PRN    polyethylene glycol (PEG 3350) (Miralax) 17 g oral packet 17 g, 17 g, Oral, Daily PRN    sennosides (Senokot) tab 17.2 mg, 17.2 mg, Oral, Nightly PRN    bisacodyl (Dulcolax) 10 MG rectal suppository 10 mg, 10 mg, Rectal, Daily  PRN    ondansetron (Zofran) 4 MG/2ML injection 4 mg, 4 mg, Intravenous, Q6H PRN    prochlorperazine (Compazine) 10 MG/2ML injection 5 mg, 5 mg, Intravenous, Q8H PRN    diphenhydrAMINE (Benadryl) cap/tab 25 mg, 25 mg, Oral, Q6H PRN    Vancomycin: PHARMACY DOSING, 1 each, Intravenous, See Admin Instructions (RX holding)    Physical Exam:  General: Alert, orientated x3.  Cooperative.  No apparent distress.  Vital Signs:  Blood pressure (!) 135/110, pulse 99, temperature 98.3 °F (36.8 °C), temperature source Oral, resp. rate 18, weight 246 lb (111.6 kg), SpO2 97%.   Temp (24hrs), Av °F (36.7 °C), Min:97.7 °F (36.5 °C), Max:98.3 °F (36.8 °C)      HEENT: Exam is unremarkable.  Without scleral icterus.  Mucous membranes are moist. PERRLA.  Oropharynx is clear.  Lungs: Clear to auscultation bilaterally.  Cardiac: Regular rate   Abdomen:  Soft, non-distended, non-tender, with no rebound or guarding.     Extremities:  No lower extremity edema noted.  Without clubbing or cyanosis.    Skin: Normal texture and turgor.  Neurologic: Cranial nerves are grossly intact.      Labs:  Lab Results   Component Value Date    CREATSERUM 7.33 2025    BUN 29 2025     2025    K 3.9 2025    CL 99 2025    CO2 26.0 2025     2025    CA 8.3 2025    ALB 4.1 2025    INR 1.08 2025    MG 2.1 2025    PHOS 5.8 2025       Radiology:  CXR:  Impression   CONCLUSION:  There is blunting right costophrenic angle consistent with right effusion.       Assessment/Plan:    1.  Recent admission with CoNS bacteremia  -admitted with viral infection and found to have 2/2 blood cultures with CoNS on 25  -in setting of HD catheter in place  -cleared bacteremia quickly on  and was DC on IV Vancomycin with HD through 25  -now admitted after missing 2 HD's sessions and reporting body aches, fevers, SOB and n/v   -repeat blood cultures 25 NGTD  -repeat RVP with seasonal  coronavirus again  -no fevers since admission, no leukocytosis   -on IV Vancomycin     2. ESRD  -on HD; as above missed two HD sessions  -CXR with RLL effusion   -HD catheter removed yesterday, tip cultures NGTD   -per nephrology        DISPO: Continue IV Vancomycin and follow up repeat blood cultures sent 2/2/25.  Follow temps. If tip culture negative and repeat blood cultures remain negative anticipate stopping IV Vancomycin soon.  D/w patient.     If you have any questions or concerns please call [facility] at [phone].     MARLEE Ashraf  2/3/2025  10:39 AM         [1]   Allergies  Allergen Reactions    Hydrochlorothiazide RASH and HIVES    Fentanyl ITCHING and NAUSEA ONLY

## 2025-02-04 NOTE — PLAN OF CARE
Assumed care at 0730  Lethargic during the day  RA. NSR on tele. VSS  Denied pain today  [facility] called. Dialysis tmrw  Pt updated on POC  Safety precautions in place

## 2025-02-04 NOTE — PAYOR COMM NOTE
--------------  CONTINUED STAY REVIEW    Payor: [facility] MEDICARE  Subscriber #:  [id number]  Authorization Number: [id number]    Admit date: 2/2/25  Admit time: 10:59 AM    2/4/25        Assessment & Plan:    A/P: 43 y/o M w/ PMHx of ESRD MWF, HTN, Severe MR s/p MVR, HFpEF, Hx of Prior DVT/PE, TIA, Bipolar Disorder, Recurrent prior GI bleeds who presents to the hospital after missing 2 dialysis sessions, fevers, body aches     Missed Dialysis  Hx of ESRD MWF  Hyperphosphatemia  Plan:  - Nephrology managing dialysis  - s/p perm cath removal from IR, cultures taken from tip  - Continue home meds, Sevalamer increased by Nephro     Hypertensive Urgency   HTN  Plan:  - Resume home meds, Coreg, Clonidine, Hydralazine, Losartan, Nifedipine  - PRN Hydralazine     Recent Staph Epi bacteremia  Current Coronavirus URI  Repeat Bcx: ngtd  Plan:  - ID consulted, continue IV Vanc for now, defer to ID for duration     BPD  - Resume home meds     BPH  - Flomax     DVT Ppx: SQH     Temp:  [97.7 °F (36.5 °C)-98.3 °F (36.8 °C)] 98.3 °F (36.8 °C)  Pulse:  [85-99] 99  Resp:  [13-35] 18  BP: (112-153)/() 135/110  SpO2:  [91 %-100 %] 97 %     Physical Exam:     Respiratory: Clear to auscultation bilaterally. No wheezes.  Cardiovascular: S1, S2. Regular rate and rhythm  Abdomen: Soft, nontender, nondistended.  Positive bowel sounds. No rebound or guarding.  Extremities: No edema.  Neuro:  Grossly non focal, no motor deficits.                       INFECTIOUS DISEASE  Assessment/Plan:     1.  Recent admission with CoNS bacteremia  -admitted with viral infection and found to have 2/2 blood cultures with CoNS on 1/18/25  -in setting of HD catheter in place  -cleared bacteremia quickly on 1/19 and was DC on IV Vancomycin with HD through 2/1/25  -now admitted after missing 2 HD's sessions and reporting body aches, fevers, SOB and n/v   -repeat blood cultures 2/2/25 NGTD  -repeat RVP with seasonal coronavirus again  -no fevers  since admission, no leukocytosis   -on IV Vancomycin      2. ESRD  -on HD; as above missed two HD sessions  -CXR with RLL effusion   -HD catheter removed yesterday, tip cultures NGTD   -per nephrology      DISPO: Continue IV Vancomycin and follow up repeat blood cultures sent 2/2/25.  Follow temps. If tip culture negative and repeat blood cultures remain negative anticipate stopping IV Vancomycin soon.     MEDICATIONS ADMINISTERED IN LAST 1 DAY:  buPROPion ER (Wellbutrin XL) 24 hr tab 150 mg       Date Action Dose Route User    2/4/2025 1014 Given 150 mg Oral Maria Ines Nicolas RN          calcium acetate (Phoslo) cap 667 mg       Date Action Dose Route User    2/3/2025 1738 Given 667 mg Oral Maria Ines Nicolas RN          FLUoxetine (PROzac) cap 40 mg       Date Action Dose Route User    2/4/2025 1013 Given 40 mg Oral Maria Ines Nicolas RN          heparin (Porcine) 5000 UNIT/ML injection 5,000 Units       Date Action Dose Route User    2/4/2025 1423 Given 5,000 Units Subcutaneous (Left Upper Arm) Maria Ines Nicolas RN    2/4/2025 0525 Given 5,000 Units Subcutaneous (Left Lower Abdomen) Sandra Valdez RN    2/3/2025 2103 Given 5,000 Units Subcutaneous (Left Lower Abdomen) Sandra Valdez RN          hydrALAZINE (Apresoline) tab 25 mg       Date Action Dose Route User    2/4/2025 1423 Given 25 mg Oral Maria Ines Nicolas RN    2/4/2025 1014 Given 25 mg Oral Maria Ines Nicolas RN          HYDROcodone-acetaminophen (Norco)  MG per tab 1 tablet       Date Action Dose Route User    2/4/2025 0348 Given 1 tablet Oral Sandra Valdez RN          lamoTRIgine (LaMICtal) tab 100 mg       Date Action Dose Route User    2/4/2025 1014 Given 100 mg Oral Maria Ines Nicolas RN          losartan (Cozaar) tab 100 mg       Date Action Dose Route User    2/4/2025 1013 Given 100 mg Oral Buranosky, Maria Ines, RN          NIFEdipine ER (Procardia-XL) 24 hr tab 30 mg       Date Action Dose Route User    2/4/2025 9355  Given 30 mg Oral Maria Ines Nicolas RN          sevelamer carbonate (Renvela) tab 2,400 mg       Date Action Dose Route User    2/3/2025 1738 Given 2,400 mg Oral Maria Ines Nicolas RN          tamsulosin (Flomax) cap 0.4 mg       Date Action Dose Route User    2/4/2025 1014 Given 0.4 mg Oral Maria Ines Nicolas RN          ARIPiprazole (Abilify) tab 15 mg       Date Action Dose Route User    2/4/2025 1013 Given 15 mg Oral Maria Ines Nicolas RN            Vitals (last day)       Date/Time Temp Pulse Resp BP SpO2 Weight O2 Device O2 Flow Rate (L/min) [facility]    02/04/25 1245 98.3 °F (36.8 °C) 99 18 135/110 -- -- None (Room air) -- SB    02/04/25 0815 98 °F (36.7 °C) 92 14 153/111 97 % -- None (Room air) -- SB    02/04/25 0500 98.3 °F (36.8 °C) 87 13 144/83 93 % -- None (Room air) -- VO    02/04/25 0000 -- 93 28 131/103 95 % -- None (Room air) -- VO    02/03/25 2315 97.7 °F (36.5 °C) 89 15 143/96 95 % -- None (Room air) -- VO    02/03/25 2215 -- 87 15 148/92 91 % -- None (Room air) -- VO    02/03/25 2115 -- 90 13 141/90 92 % -- None (Room air) -- VO    02/03/25 2015 98.1 °F (36.7 °C) -- -- -- -- -- None (Room air) -- VO    02/03/25 1730 97.7 °F (36.5 °C) 94 35 134/97 -- -- None (Room air) -- SB    02/03/25 1235 97.7 °F (36.5 °C) 79 18 134/97 -- -- None (Room air) -- SB    02/03/25 0820 98.4 °F (36.9 °C) 85 19 153/94 94 % -- None (Room air) -- SB    02/03/25 0544 97.5 °F (36.4 °C) 83 19 120/74 95 % -- None (Room air) -- VO    02/03/25 0000 98.1 °F (36.7 °C) 92 15 139/93 93 % -- None (Room air) -- VO

## 2025-02-04 NOTE — PROGRESS NOTES
[facility]   part of [facility] Hospitalist Progress Note     Godwin Fonseca Patient Status:  Inpatient    1980 MRN [id number]   Location [facility] 7NE-A Attending Jagdeep Joseph, DO   Hosp Day # 2 PCP Adrian Small MD     Assessment & Plan:    A/P: 45 y/o M w/ PMHx of ESRD MWF, HTN, Severe MR s/p MVR, HFpEF, Hx of Prior DVT/PE, TIA, Bipolar Disorder, Recurrent prior GI bleeds who presents to the hospital after missing 2 dialysis sessions, fevers, body aches     Missed Dialysis  Hx of ESRD MWF  Hyperphosphatemia  Plan:  - Nephrology managing dialysis  - s/p perm cath removal from IR, cultures taken from tip  - Continue home meds, Sevalamer increased by Nephro     Hypertensive Urgency - resolved  HTN  Plan:  - Resume home meds, Coreg, Clonidine, Hydralazine, Losartan, Nifedipine  - PRN Hydralazine     Recent Staph Epi bacteremia  Current Coronavirus URI  Repeat Bcx: ngtd  Plan:  - ID consulted, continue IV Vanc for now, defer to ID for duration     BPD  - Resume home meds     BPH  - Flomax    DVT Ppx: SQH    Subjective:     Patient seen and examined this morning at bedside. Sleeping, did not want to speak to me, will attempt to see again later today.    Vital signs:  Temp:  [97.7 °F (36.5 °C)-98.3 °F (36.8 °C)] 98.3 °F (36.8 °C)  Pulse:  [85-99] 99  Resp:  [13-35] 18  BP: (112-153)/() 135/110  SpO2:  [91 %-100 %] 97 %    Physical Exam:    General: No acute distress.   Respiratory: Clear to auscultation bilaterally. No wheezes.  Cardiovascular: S1, S2. Regular rate and rhythm  Abdomen: Soft, nontender, nondistended.  Positive bowel sounds. No rebound or guarding.  Extremities: No edema.  Neuro:  Grossly non focal, no motor deficits.      Diagnostic Data:    Pertinent Labs and Imaging independently reviewed  Comments:  none    Jagdeep Joseph D.O.  [facility] Hospitalist

## 2025-02-04 NOTE — PROGRESS NOTES
[facility] - Nephrology  Inpatient Follow-up    Godwin Fonseca Patient Status:  Inpatient    1980 MRN [id number]   formerly [facility] 7NE-A Attending Jagdeep Joseph, DO   Hosp Day # 2 PCP Adrian Small MD       SUBJECTIVE:     Patient seen and examined at bedside. No acute complaints today.     MEDICATIONS:     Current Facility-Administered Medications   Medication Dose Route Frequency    naloxone (Narcan) 0.4 MG/ML injection 0.08 mg  0.08 mg Intravenous PRN    albuterol (Ventolin HFA) 108 (90 Base) MCG/ACT inhaler 2 puff  2 puff Inhalation Q6H PRN    ARIPiprazole (Abilify) tab 15 mg  15 mg Oral Daily    buPROPion ER (Wellbutrin XL) 24 hr tab 150 mg  150 mg Oral Daily    calcium acetate (Phoslo) cap 667 mg  667 mg Oral TID CC    carvedilol (Coreg) tab 25 mg  25 mg Oral BID with meals    cloNIDine (Catapres) tab 0.1 mg  0.1 mg Oral Nightly    FLUoxetine (PROzac) cap 40 mg  40 mg Oral Daily    gabapentin (Neurontin) cap 200 mg  200 mg Oral TID    hydrALAZINE (Apresoline) tab 25 mg  25 mg Oral TID    HYDROcodone-acetaminophen (Norco)  MG per tab 1 tablet  1 tablet Oral Q6H PRN    lamoTRIgine (LaMICtal) tab 100 mg  100 mg Oral Daily    losartan (Cozaar) tab 100 mg  100 mg Oral Daily    NIFEdipine ER (Procardia-XL) 24 hr tab 30 mg  30 mg Oral Daily    sevelamer carbonate (Renvela) tab 2,400 mg  2,400 mg Oral TID CC    tamsulosin (Flomax) cap 0.4 mg  0.4 mg Oral Daily    heparin (Porcine) 5000 UNIT/ML injection 5,000 Units  5,000 Units Subcutaneous Q8H MARTHA    melatonin tab 3 mg  3 mg Oral Nightly PRN    polyethylene glycol (PEG 3350) (Miralax) 17 g oral packet 17 g  17 g Oral Daily PRN    sennosides (Senokot) tab 17.2 mg  17.2 mg Oral Nightly PRN    bisacodyl (Dulcolax) 10 MG rectal suppository 10 mg  10 mg Rectal Daily PRN    ondansetron (Zofran) 4 MG/2ML injection 4 mg  4 mg Intravenous Q6H PRN    prochlorperazine (Compazine) 10 MG/2ML injection 5 mg  5 mg Intravenous Q8H PRN    diphenhydrAMINE  (Benadryl) cap/tab 25 mg  25 mg Oral Q6H PRN    Vancomycin: PHARMACY DOSING  1 each Intravenous See Admin Instructions (RX holding)       PHYSICAL EXAM:     Vital Signs: BP (!) 135/110 (BP Location: Right arm)   Pulse 99   Temp 98.3 °F (36.8 °C) (Oral)   Resp 18   Wt 246 lb (111.6 kg)   SpO2 97%   BMI 31.58 kg/m²   Temp (24hrs), Av °F (36.7 °C), Min:97.7 °F (36.5 °C), Max:98.3 °F (36.8 °C)       Intake/Output Summary (Last 24 hours) at 2025 1451  Last data filed at 2025 1000  Gross per 24 hour   Intake 1350 ml   Output 2000 ml   Net -650 ml     Wt Readings from Last 3 Encounters:   25 246 lb (111.6 kg)   25 246 lb 14.6 oz (112 kg)   25 244 lb 11.4 oz (111 kg)       General: no acute distress  HEENT: normocephalic, atraumatic  CV: RRR  Respiratory: no distress  Extremities: no edema bilaterally  Skin: warm, dry  Neuro: awake, alert     LABORATORY DATA:     Lab Results   Component Value Date     (H) 2025    BUN 29 (H) 2025    BUNCREA 13.0 2022    CREATSERUM 7.33 (H) 2025    ANIONGAP 13 2025    GFR 59 (L) 2018    GFRNAA 30 (L) 2022    GFRAA 35 (L) 2022    CA 8.3 (L) 2025    OSMOCALC 292 2025    ALKPHO 90 2025    AST 45 (H) 2025    ALT 17 2025    BILT 0.5 2025    TP 7.4 2025    ALB 4.1 2025    GLOBULIN 3.0 2025     2025    K 3.9 2025    CL 99 2025    CO2 26.0 2025     Lab Results   Component Value Date    WBC 5.5 2025    RBC 3.15 (L) 2025    HGB 10.0 (L) 2025    HCT 30.0 (L) 2025    .0 2025    MPV 11.5 2012    MCV 95.2 2025    MCH 31.7 2025    MCHC 33.3 2025    RDW 16.1 2025    NEPRELIM 2.99 2025    NEPERCENT 54.5 2025    LYPERCENT 20.9 2025    MOPERCENT 13.1 2025    EOPERCENT 9.5 2025    BAPERCENT 1.8 2025    NE 2.99 2025    LYMABS 1.15  02/03/2025    MOABSO 0.72 02/03/2025    EOABSO 0.52 02/03/2025    BAABSO 0.10 02/03/2025     Lab Results   Component Value Date    MALBP 167.00 05/24/2023    CREUR 142.00 05/24/2023    CREUR 142.00 05/24/2023     Lab Results   Component Value Date    COLORUR Light-Yellow 11/17/2024    CLARITY Clear 11/17/2024    SPECGRAVITY 1.013 11/17/2024    GLUUR Normal 11/17/2024    BILUR Negative 11/17/2024    KETUR Negative 11/17/2024    BLOODURINE Negative 11/17/2024    PHURINE 8.5 (H) 11/17/2024    PROUR 100 (A) 11/17/2024    UROBILINOGEN Normal 11/17/2024    NITRITE Negative 11/17/2024    LEUUR Negative 11/17/2024    WBCUR 1-5 11/17/2024    RBCUR 0-2 11/17/2024    EPIUR Few (A) 11/17/2024    BACUR Rare (A) 11/17/2024    CAOXUR Occasional (A) 04/20/2018    HYLUR Present (A) 05/14/2019       IMAGING:     Reviewed    ASSESSMENT/PLAN:     44 year old  male w ho ESRD on HD MWF with LUE AVF (but also still have RIJ tunnelled HD catheter), HTN, severe MR s/p MVR x2, HFpEF, DVT/PE, TIA, MGUS, bipolar disorder, recurrent GI bleed who presents with missed HD treatment x 2, fever, body aches.       ESRD on HD  -- HD MWF per schedule  -- LUE AVF  -- R perm cath removed 2/3     Anemia  -- epo prn hgb < 10      MBD / Hyperphosphatemia  -- increased sevelamer to 2400 TID AC  -- ca acetate 667 tid ac  -- reiterated low phos diet     HTN:  -- coreg and losartan pre-HD  -- takes remaining hypertensives pre-HD if systolic > 180 but should take clonidine consistently to avoid rebound     Fevers / recent bacteremia  -- Antibiotics per ID        We will continue to follow.    Thank you for allowing us to participate in the care of this patient.       DO Kevin Hallman [facility] - Nephrology

## 2025-02-04 NOTE — PLAN OF CARE
Assumed care at approximately 1930   A & O x 4  RA  NSR on tele  C/o neck pain overnight, managed with PRN Norco.   HD completed overnight, 2 L out.   Call light within reach   Patient updated on plan of care

## 2025-02-04 NOTE — PLAN OF CARE
Assumed care at 0730  Lethargic this morning/afternoon. Didn't sleep last night per pt  RA. NSR on tele. VSS  Denied pain today  HD permacath removed in IR. Vitals stable afterwards. Vitals until 1115  Will have dialysis tonight around 2000  Pt updated on POC  Safety precautions in place

## 2025-02-05 LAB
ALBUMIN SERPL-MCNC: 3.8 G/DL (ref 3.2–4.8)
ANION GAP SERPL CALC-SCNC: 16 MMOL/L (ref 0–18)
BUN BLD-MCNC: 45 MG/DL (ref 9–23)
CALCIUM BLD-MCNC: 8.4 MG/DL (ref 8.7–10.6)
CHLORIDE SERPL-SCNC: 100 MMOL/L (ref 98–112)
CO2 SERPL-SCNC: 25 MMOL/L (ref 21–32)
CREAT BLD-MCNC: 9.03 MG/DL
EGFRCR SERPLBLD CKD-EPI 2021: 7 ML/MIN/1.73M2 (ref 60–?)
GLUCOSE BLD-MCNC: 144 MG/DL (ref 70–99)
MAGNESIUM SERPL-MCNC: 2.3 MG/DL (ref 1.6–2.6)
OSMOLALITY SERPL CALC.SUM OF ELEC: 306 MOSM/KG (ref 275–295)
PHOSPHATE SERPL-MCNC: 6.4 MG/DL (ref 2.4–5.1)
POTASSIUM SERPL-SCNC: 3.8 MMOL/L (ref 3.5–5.1)
SODIUM SERPL-SCNC: 141 MMOL/L (ref 136–145)
VANCOMYCIN SERPL-MCNC: 17.3 UG/ML (ref ?–40)

## 2025-02-05 PROCEDURE — 80202 ASSAY OF VANCOMYCIN: CPT | Performed by: EMERGENCY MEDICINE

## 2025-02-05 PROCEDURE — 90935 HEMODIALYSIS ONE EVALUATION: CPT | Performed by: STUDENT IN AN ORGANIZED HEALTH CARE EDUCATION/TRAINING PROGRAM

## 2025-02-05 PROCEDURE — 80069 RENAL FUNCTION PANEL: CPT | Performed by: INTERNAL MEDICINE

## 2025-02-05 PROCEDURE — 83735 ASSAY OF MAGNESIUM: CPT | Performed by: INTERNAL MEDICINE

## 2025-02-05 NOTE — PAYOR COMM NOTE
--------------  CONTINUED STAY REVIEW    Payor: [facility] MEDICARE  Subscriber #:  [id number]  Authorization Number: [id number]    Admit date: 2/2/25  Admit time: 10:59 AM    2/5/25         Assessment & Plan:    A/P: 45 y/o M w/ PMHx of ESRD MWF, HTN, Severe MR s/p MVR, HFpEF, Hx of Prior DVT/PE, TIA, Bipolar Disorder, Recurrent prior GI bleeds who presents to the hospital after missing 2 dialysis sessions, fevers, body aches     Missed Dialysis  Hx of ESRD MWF  Hyperphosphatemia  Plan:  - Nephrology managing dialysis  - s/p perm cath removal from IR, cultures taken from tip  - Continue home meds, Sevalamer increased by Nephro     Hypertensive Urgency - resolved  HTN  Plan:  - Resume home meds, Coreg, Clonidine, Hydralazine, Losartan, Nifedipine  - PRN Hydralazine     Recent Staph Epi bacteremia  Current Coronavirus URI  Repeat Bcx: ngtd  Plan:  - ID consulted, continue IV Vanc for now, defer to ID for duration     BPD  - Resume home meds     BPH  - Flomax     DVT Ppx: SQH     Temp:  [98 °F (36.7 °C)-98.3 °F (36.8 °C)] 98.3 °F (36.8 °C)  Pulse:  [] 83  Resp:  [10-18] 18  BP: (112-154)/() 148/92  SpO2:  [96 %] 96 %     Respiratory: Clear to auscultation bilaterally. No wheezes.  Cardiovascular: S1, S2. Regular rate and rhythm  Abdomen: Soft, nontender, nondistended.  Positive bowel sounds. No rebound or guarding.  Extremities: No edema.  Neuro:  Grossly non focal, no motor deficits.                         NEPHROLOGY  ASSESSMENT/PLAN:      44 year old  male w ho ESRD on HD MWF with LUE AVF (but also still have RIJ tunnelled HD catheter), HTN, severe MR s/p MVR x2, HFpEF, DVT/PE, TIA, MGUS, bipolar disorder, recurrent GI bleed who presents with missed HD treatment x 2, fever, body aches.       ESRD on HD  -- HD MWF per schedule  -- LUE AVF  -- R perm cath removed 2/3     MBD / Hyperphosphatemia  -- increased sevelamer to 2400 TID AC  -- ca acetate 667 tid ac  -- reiterated low phos diet     HTN:  --  coreg and losartan pre-HD  -- takes remaining hypertensives pre-HD if systolic > 180 but should take clonidine consistently to avoid rebound     Fevers / recent bacteremia  -- Antibiotics per ID    MEDICATIONS ADMINISTERED IN LAST 1 DAY:  buPROPion ER (Wellbutrin XL) 24 hr tab 150 mg       Date Action Dose Route User    2/5/2025 1008 Given 150 mg Oral Lizzy Ingram RN          calcium acetate (Phoslo) cap 667 mg       Date Action Dose Route User    2/5/2025 1008 Given 667 mg Oral Lizzy Ingram RN    2/4/2025 1713 Given 667 mg Oral Maria Ines Nicolas RN          carvedilol (Coreg) tab 25 mg       Date Action Dose Route User    2/5/2025 1009 Given 25 mg Oral Lizzy Ingram RN    2/4/2025 1713 Given 25 mg Oral Maria Ines Nicolas RN          cloNIDine (Catapres) tab 0.1 mg       Date Action Dose Route User    2/4/2025 2103 Given 0.1 mg Oral Sandra Valdez RN          FLUoxetine (PROzac) cap 40 mg       Date Action Dose Route User    2/5/2025 1009 Given 40 mg Oral Lizzy Ingram RN          heparin (Porcine) 5000 UNIT/ML injection 5,000 Units       Date Action Dose Route User    2/5/2025 0537 Given 5,000 Units Subcutaneous (Left Lower Abdomen) Sandra Valdez RN    2/4/2025 2103 Given 5,000 Units Subcutaneous (Left Lower Abdomen) Sandra Valdez RN          hydrALAZINE (Apresoline) tab 25 mg       Date Action Dose Route User    2/4/2025 2103 Given 25 mg Oral Sandra Valdez RN          lamoTRIgine (LaMICtal) tab 100 mg       Date Action Dose Route User    2/5/2025 1009 Given 100 mg Oral Lizzy Ingram RN          losartan (Cozaar) tab 100 mg       Date Action Dose Route User    2/5/2025 1009 Given 100 mg Oral Lizzy Ingram RN          sevelamer carbonate (Renvela) tab 2,400 mg       Date Action Dose Route User    2/5/2025 1008 Given 2,400 mg Oral Lizzy Ingram RN    2/4/2025 1712 Given 2,400 mg Oral Maria Ines Nioclas, RN          tamsulosin (Flomax) cap 0.4 mg       Date Action Dose Route User     2/5/2025 1009 Given 0.4 mg Oral Lizzy Ingram RN          ARIPiprazole (Abilify) tab 15 mg       Date Action Dose Route User    2/5/2025 1009 Given 15 mg Oral Lizzy Ingram RN            Vitals (last day)       Date/Time Temp Pulse Resp BP SpO2 Weight O2 Device O2 Flow Rate (L/min) Who    02/05/25 1300 98.3 °F (36.8 °C) 83 18 148/92 96 % -- None (Room air) -- ID    02/05/25 0815 98 °F (36.7 °C) 91 14 154/112 -- -- None (Room air) -- ID    02/05/25 0445 98 °F (36.7 °C) 84 15 128/74 -- -- None (Room air) -- RP    02/05/25 0100 98.3 °F (36.8 °C) 91 14 151/84 -- -- None (Room air) -- RP    02/04/25 2030 98.1 °F (36.7 °C) 86 10 128/80 -- -- None (Room air) -- RP    02/04/25 1700 -- 100 16 -- -- -- -- -- SB    02/04/25 1600 98.1 °F (36.7 °C) 88 18 112/71 -- -- None (Room air) -- SB    02/04/25 1245 98.3 °F (36.8 °C) 99 18 135/110 -- -- None (Room air) -- SB    02/04/25 0815 98 °F (36.7 °C) 92 14 153/111 97 % -- None (Room air) -- SB    02/04/25 0700 -- 85 -- -- 100 % -- -- -- DG    02/04/25 0500 98.3 °F (36.8 °C) 87 13 144/83 93 % -- None (Room air) -- VO    02/04/25 0000 -- 93 28 131/103 95 % -- None (Room air) -- VO

## 2025-02-05 NOTE — PROGRESS NOTES
[facility] - Nephrology  Inpatient Follow-up    Godwin Fonseca Patient Status:  Inpatient    1980 MRN [id number]   [facility] 7NE-A Attending Jagdeep Joseph, DO   Hosp Day # 3 PCP Adrian Small MD       SUBJECTIVE:     Patient seen and examined at bedside. No acute complaints today.     MEDICATIONS:     Current Facility-Administered Medications   Medication Dose Route Frequency    vancomycin (Vancocin) 750 mg in sodium chloride 0.9% 250 mL IVPB-ADDV  7.5 mg/kg Intravenous Once    naloxone (Narcan) 0.4 MG/ML injection 0.08 mg  0.08 mg Intravenous PRN    albuterol (Ventolin HFA) 108 (90 Base) MCG/ACT inhaler 2 puff  2 puff Inhalation Q6H PRN    ARIPiprazole (Abilify) tab 15 mg  15 mg Oral Daily    buPROPion ER (Wellbutrin XL) 24 hr tab 150 mg  150 mg Oral Daily    calcium acetate (Phoslo) cap 667 mg  667 mg Oral TID CC    carvedilol (Coreg) tab 25 mg  25 mg Oral BID with meals    cloNIDine (Catapres) tab 0.1 mg  0.1 mg Oral Nightly    FLUoxetine (PROzac) cap 40 mg  40 mg Oral Daily    hydrALAZINE (Apresoline) tab 25 mg  25 mg Oral TID    HYDROcodone-acetaminophen (Norco)  MG per tab 1 tablet  1 tablet Oral Q6H PRN    lamoTRIgine (LaMICtal) tab 100 mg  100 mg Oral Daily    losartan (Cozaar) tab 100 mg  100 mg Oral Daily    NIFEdipine ER (Procardia-XL) 24 hr tab 30 mg  30 mg Oral Daily    sevelamer carbonate (Renvela) tab 2,400 mg  2,400 mg Oral TID CC    tamsulosin (Flomax) cap 0.4 mg  0.4 mg Oral Daily    heparin (Porcine) 5000 UNIT/ML injection 5,000 Units  5,000 Units Subcutaneous Q8H MARTHA    melatonin tab 3 mg  3 mg Oral Nightly PRN    polyethylene glycol (PEG 3350) (Miralax) 17 g oral packet 17 g  17 g Oral Daily PRN    sennosides (Senokot) tab 17.2 mg  17.2 mg Oral Nightly PRN    bisacodyl (Dulcolax) 10 MG rectal suppository 10 mg  10 mg Rectal Daily PRN    ondansetron (Zofran) 4 MG/2ML injection 4 mg  4 mg Intravenous Q6H PRN    prochlorperazine (Compazine) 10 MG/2ML injection 5  mg  5 mg Intravenous Q8H PRN    diphenhydrAMINE (Benadryl) cap/tab 25 mg  25 mg Oral Q6H PRN    Vancomycin: PHARMACY DOSING  1 each Intravenous See Admin Instructions (RX holding)       PHYSICAL EXAM:     Vital Signs: BP (!) 148/92 (BP Location: Right arm)   Pulse 83   Temp 98.3 °F (36.8 °C) (Oral)   Resp 18   Wt 246 lb (111.6 kg)   SpO2 96%   BMI 31.58 kg/m²   Temp (24hrs), Av.1 °F (36.7 °C), Min:98 °F (36.7 °C), Max:98.3 °F (36.8 °C)       Intake/Output Summary (Last 24 hours) at 2025 1348  Last data filed at 2025 1100  Gross per 24 hour   Intake 600 ml   Output --   Net 600 ml     Wt Readings from Last 3 Encounters:   25 246 lb (111.6 kg)   25 246 lb 14.6 oz (112 kg)   25 244 lb 11.4 oz (111 kg)       General: no acute distress  HEENT: normocephalic, atraumatic  CV: RRR  Respiratory: no distress  Extremities: no edema bilaterally  Neuro: awake, alert     LABORATORY DATA:     Lab Results   Component Value Date     (H) 2025    BUN 45 (H) 2025    BUNCREA 13.0 2022    CREATSERUM 9.03 (H) 2025    ANIONGAP 16 2025    GFR 59 (L) 2018    GFRNAA 30 (L) 2022    GFRAA 35 (L) 2022    CA 8.4 (L) 2025    OSMOCALC 306 (H) 2025    ALKPHO 90 2025    AST 45 (H) 2025    ALT 17 2025    BILT 0.5 2025    TP 7.4 2025    ALB 3.8 2025    GLOBULIN 3.0 2025     2025    K 3.8 2025     2025    CO2 25.0 2025     Lab Results   Component Value Date    WBC 5.5 2025    RBC 3.15 (L) 2025    HGB 10.0 (L) 2025    HCT 30.0 (L) 2025    .0 2025    MPV 11.5 2012    MCV 95.2 2025    MCH 31.7 2025    MCHC 33.3 2025    RDW 16.1 2025    NEPRELIM 2.99 2025    NEPERCENT 54.5 2025    LYPERCENT 20.9 2025    MOPERCENT 13.1 2025    EOPERCENT 9.5 2025    BAPERCENT 1.8 2025    NE 2.99  02/03/2025    LYMABS 1.15 02/03/2025    MOABSO 0.72 02/03/2025    EOABSO 0.52 02/03/2025    BAABSO 0.10 02/03/2025     Lab Results   Component Value Date    MALBP 167.00 05/24/2023    CREUR 142.00 05/24/2023    CREUR 142.00 05/24/2023     Lab Results   Component Value Date    COLORUR Light-Yellow 11/17/2024    CLARITY Clear 11/17/2024    SPECGRAVITY 1.013 11/17/2024    GLUUR Normal 11/17/2024    BILUR Negative 11/17/2024    KETUR Negative 11/17/2024    BLOODURINE Negative 11/17/2024    PHURINE 8.5 (H) 11/17/2024    PROUR 100 (A) 11/17/2024    UROBILINOGEN Normal 11/17/2024    NITRITE Negative 11/17/2024    LEUUR Negative 11/17/2024    WBCUR 1-5 11/17/2024    RBCUR 0-2 11/17/2024    EPIUR Few (A) 11/17/2024    BACUR Rare (A) 11/17/2024    CAOXUR Occasional (A) 04/20/2018    HYLUR Present (A) 05/14/2019       IMAGING:     Reviewed    ASSESSMENT/PLAN:     44 year old  male w ho ESRD on HD MWF with LUE AVF (but also still have RIJ tunnelled HD catheter), HTN, severe MR s/p MVR x2, HFpEF, DVT/PE, TIA, MGUS, bipolar disorder, recurrent GI bleed who presents with missed HD treatment x 2, fever, body aches.       ESRD on HD  -- HD MWF per schedule  -- LUE AVF  -- R perm cath removed 2/3     MBD / Hyperphosphatemia  -- increased sevelamer to 2400 TID AC  -- ca acetate 667 tid ac  -- reiterated low phos diet     HTN:  -- coreg and losartan pre-HD  -- takes remaining hypertensives pre-HD if systolic > 180 but should take clonidine consistently to avoid rebound     Fevers / recent bacteremia  -- Antibiotics per ID        We will continue to follow.    Thank you for allowing us to participate in the care of this patient.       DO Kevin Hallman [facility] - Nephrology

## 2025-02-05 NOTE — PLAN OF CARE
Assumed care at approximately 1930   A & O x 4  RA  NSR on tele   Plan for HD  Denies pain  Call light within reach   Patient updated on plan of care

## 2025-02-05 NOTE — PROGRESS NOTES
[facility]   part of [facility] Infectious Disease Progress Note    Godwin Fonseca Patient Status:  Inpatient    1980 MRN [id number]   [facility] 7NE-A Attending Jagdeep Joseph, DO   Hosp Day # 3 PCP Adrain Small MD     Subjective:  Pt complains of tiredness    Objective:    Allergies:  Allergies[1]    Medications:    Current Facility-Administered Medications:     vancomycin (Vancocin) 750 mg in sodium chloride 0.9% 250 mL IVPB-ADDV, 7.5 mg/kg, Intravenous, Once    naloxone (Narcan) 0.4 MG/ML injection 0.08 mg, 0.08 mg, Intravenous, PRN    albuterol (Ventolin HFA) 108 (90 Base) MCG/ACT inhaler 2 puff, 2 puff, Inhalation, Q6H PRN    ARIPiprazole (Abilify) tab 15 mg, 15 mg, Oral, Daily    buPROPion ER (Wellbutrin XL) 24 hr tab 150 mg, 150 mg, Oral, Daily    calcium acetate (Phoslo) cap 667 mg, 667 mg, Oral, TID CC    carvedilol (Coreg) tab 25 mg, 25 mg, Oral, BID with meals    cloNIDine (Catapres) tab 0.1 mg, 0.1 mg, Oral, Nightly    FLUoxetine (PROzac) cap 40 mg, 40 mg, Oral, Daily    hydrALAZINE (Apresoline) tab 25 mg, 25 mg, Oral, TID    HYDROcodone-acetaminophen (Norco)  MG per tab 1 tablet, 1 tablet, Oral, Q6H PRN    lamoTRIgine (LaMICtal) tab 100 mg, 100 mg, Oral, Daily    losartan (Cozaar) tab 100 mg, 100 mg, Oral, Daily    NIFEdipine ER (Procardia-XL) 24 hr tab 30 mg, 30 mg, Oral, Daily    sevelamer carbonate (Renvela) tab 2,400 mg, 2,400 mg, Oral, TID CC    tamsulosin (Flomax) cap 0.4 mg, 0.4 mg, Oral, Daily    heparin (Porcine) 5000 UNIT/ML injection 5,000 Units, 5,000 Units, Subcutaneous, Q8H MARTHA    melatonin tab 3 mg, 3 mg, Oral, Nightly PRN    polyethylene glycol (PEG 3350) (Miralax) 17 g oral packet 17 g, 17 g, Oral, Daily PRN    sennosides (Senokot) tab 17.2 mg, 17.2 mg, Oral, Nightly PRN    bisacodyl (Dulcolax) 10 MG rectal suppository 10 mg, 10 mg, Rectal, Daily PRN    ondansetron (Zofran) 4 MG/2ML injection 4 mg, 4 mg, Intravenous, Q6H PRN     prochlorperazine (Compazine) 10 MG/2ML injection 5 mg, 5 mg, Intravenous, Q8H PRN    diphenhydrAMINE (Benadryl) cap/tab 25 mg, 25 mg, Oral, Q6H PRN    Vancomycin: PHARMACY DOSING, 1 each, Intravenous, See Admin Instructions (RX holding)    Physical Exam:  General: Alert, orientated x3.  Cooperative.  No apparent distress.  Vital Signs:  Blood pressure (!) 154/112, pulse 91, temperature 98 °F (36.7 °C), temperature source Oral, resp. rate 14, weight 246 lb (111.6 kg), SpO2 97%.   Temp (24hrs), Av.1 °F (36.7 °C), Min:98 °F (36.7 °C), Max:98.3 °F (36.8 °C)      HEENT: Exam is unremarkable.  Without scleral icterus.  Mucous membranes are moist. PERRLA.  Oropharynx is clear.  Neck: No tenderness to palpitation.  Full range of motion to flexion and extension, lateral rotation and lateral flexion of cervical spine.  No JVD. Supple.   Lungs: Clear to auscultation bilaterally.  Cardiac: Regular rate and rhythm. No murmur.  Abdomen:  Soft, non-distended, non-tender, with no rebound or guarding.   Extremities:  No lower extremity edema noted.  Without clubbing or cyanosis.    Skin: Normal texture and turgor.  Neurologic: Cranial nerves are grossly intact.  Motor strength and sensory examination is grossly normal.  No focal neurologic deficit.    Labs:  Lab Results   Component Value Date    CREATSERUM 9.03 2025    BUN 45 2025     2025    K 3.8 2025     2025    CO2 25.0 2025     2025    CA 8.4 2025    ALB 3.8 2025    MG 2.3 2025    PHOS 6.4 2025         Assessment/Plan:    1.  N/V, fever and body aches  -secondary to coronavirus  -supportive care  2.  CoNS bacteremia  -seen on recent admission, blood culture positive on 25, repeat NG  -was dc on IV vancomycin through 25  -repeat here NGTD  3.  ESRD on HD  -via AVF  -HD cath not being used, s/p removal on 2/3/25, cultures NGTD  -missed 2 sessions PTA  4.  Dispo  -hopefully dc IV  vancomycin soon pending cultures    If you have any questions or concerns please call [facility] Infectious Disease at [phone].     PILAR Dorman         [1]   Allergies  Allergen Reactions    Hydrochlorothiazide RASH and HIVES    Fentanyl ITCHING and NAUSEA ONLY

## 2025-02-05 NOTE — PLAN OF CARE
Assumed care at 0730  Orientated x4, lethargic, NSR, RA   Denies pain     Dialysis today   Fair appetite   Needs met     Awaiting cultures

## 2025-02-05 NOTE — PROGRESS NOTES
[facility]   part of [facility] Hospitalist Progress Note     Godwin Fonseca Patient Status:  Inpatient    1980 MRN [id number]   Location [facility] 7NE-A Attending Jagdeep Joseph, DO   Hosp Day # 3 PCP Adrian Small MD     Assessment & Plan:    A/P: 43 y/o M w/ PMHx of ESRD MWF, HTN, Severe MR s/p MVR, HFpEF, Hx of Prior DVT/PE, TIA, Bipolar Disorder, Recurrent prior GI bleeds who presents to the hospital after missing 2 dialysis sessions, fevers, body aches     Missed Dialysis  Hx of ESRD MWF  Hyperphosphatemia  Plan:  - Nephrology managing dialysis  - s/p perm cath removal from IR, cultures taken from tip  - Continue home meds, Sevalamer increased by Nephro     Hypertensive Urgency - resolved  HTN  Plan:  - Resume home meds, Coreg, Clonidine, Hydralazine, Losartan, Nifedipine  - PRN Hydralazine     Recent Staph Epi bacteremia  Current Coronavirus URI  Repeat Bcx: ngtd  Plan:  - ID consulted, continue IV Vanc for now, defer to ID for duration     BPD  - Resume home meds     BPH  - Flomax    DVT Ppx: SQH    Subjective:     Patient seen and examined this morning at bedside. Doing well, happy that his perm cath is out. SW discussion regarding dialysis transfer pending    Vital signs:  Temp:  [98 °F (36.7 °C)-98.3 °F (36.8 °C)] 98.3 °F (36.8 °C)  Pulse:  [] 83  Resp:  [10-18] 18  BP: (112-154)/() 148/92  SpO2:  [96 %] 96 %    Physical Exam:    General: No acute distress.   Respiratory: Clear to auscultation bilaterally. No wheezes.  Cardiovascular: S1, S2. Regular rate and rhythm  Abdomen: Soft, nontender, nondistended.  Positive bowel sounds. No rebound or guarding.  Extremities: No edema.  Neuro:  Grossly non focal, no motor deficits.      Diagnostic Data:    Pertinent Labs and Imaging independently reviewed  Comments:  Bmp noted    Jagdeep Joseph D.O.  [facility] Hospitalist

## 2025-02-05 NOTE — PROGRESS NOTES
[facility] Pharmacy Dosing Service      Follow Up Pharmacokinetic Consult for Vancomycin Dosing     Godwin Fonseca is a 44 year old male who is receiving vancomycin therapy for  line infection . Patient is on day 4 of vancomycin and is currently being dosed by levels. The current treatment and monitoring approach is non-AUC strategy.        Weight and Temperature:    Wt Readings from Last 1 Encounters:   25 111.6 kg (246 lb)         Temp Readings from Last 1 Encounters:   25 98 °F (36.7 °C) (Temporal)      Labs:   Recent Labs   Lab 25  0554 25  0634 25  0600   CREATSERUM 10.61* 7.33* 9.03*      Estimated Creatinine Clearance: 11.9 mL/min (A) (based on SCr of 9.03 mg/dL (H)).     Recent Labs   Lab 25  0702 25  0554   WBC 8.4 5.5        Vancomycin Levels:  Lab Results   Component Value Date/Time    VANCR 17.3 2025 06:01 AM    VANCR 27.6 2025 07:20 PM       Corresponding 24 h-AUC: N/A     The Pharmacokinetic Target is:    Trough/random 15-20 mg/L (applies to IV dosing in HD and IP dosing in APD)    Renal Dosing Considerations:    HD: Current regimen: MWF, next session today     Assessment/Plan:   Maintenance Regimen:  Pulse dosing    Monitorin) Give vancomycin 750 mg (7.5 mg/kg) IVPB once after HD today.    2) Plan for vancomycin random level to be obtained   with AM labs    3) Pharmacy will order SCr as clinically indicated to assess renal function.    4) Pharmacy will monitor for toxicity and efficacy, adjust vancomycin dose and/or frequency, and order vancomycin levels as appropriate per the Pharmacy and Therapeutics Committee approved protocol until discontinuation of the medication.       We appreciate the opportunity to assist in the care of this patient.     Cande Block, PharmD  2025  8:10 AM  [facility]  Pharmacy Extension: [phone]

## 2025-02-06 LAB
ALBUMIN SERPL-MCNC: 3.8 G/DL (ref 3.2–4.8)
ANION GAP SERPL CALC-SCNC: 13 MMOL/L (ref 0–18)
BUN BLD-MCNC: 31 MG/DL (ref 9–23)
CALCIUM BLD-MCNC: 8.5 MG/DL (ref 8.7–10.6)
CHLORIDE SERPL-SCNC: 101 MMOL/L (ref 98–112)
CO2 SERPL-SCNC: 25 MMOL/L (ref 21–32)
CREAT BLD-MCNC: 6.58 MG/DL
EGFRCR SERPLBLD CKD-EPI 2021: 10 ML/MIN/1.73M2 (ref 60–?)
GLUCOSE BLD-MCNC: 190 MG/DL (ref 70–99)
MAGNESIUM SERPL-MCNC: 2.1 MG/DL (ref 1.6–2.6)
OSMOLALITY SERPL CALC.SUM OF ELEC: 300 MOSM/KG (ref 275–295)
PHOSPHATE SERPL-MCNC: 5.6 MG/DL (ref 2.4–5.1)
POTASSIUM SERPL-SCNC: 3.7 MMOL/L (ref 3.5–5.1)
SODIUM SERPL-SCNC: 139 MMOL/L (ref 136–145)

## 2025-02-06 PROCEDURE — 94760 N-INVAS EAR/PLS OXIMETRY 1: CPT

## 2025-02-06 PROCEDURE — 80069 RENAL FUNCTION PANEL: CPT | Performed by: INTERNAL MEDICINE

## 2025-02-06 PROCEDURE — 83735 ASSAY OF MAGNESIUM: CPT | Performed by: INTERNAL MEDICINE

## 2025-02-06 RX ORDER — HYDROMORPHONE HYDROCHLORIDE 1 MG/ML
0.4 INJECTION, SOLUTION INTRAMUSCULAR; INTRAVENOUS; SUBCUTANEOUS ONCE
Status: COMPLETED | OUTPATIENT
Start: 2025-02-06 | End: 2025-02-06

## 2025-02-06 RX ORDER — HYDROMORPHONE HYDROCHLORIDE 1 MG/ML
0.5 INJECTION, SOLUTION INTRAMUSCULAR; INTRAVENOUS; SUBCUTANEOUS ONCE
Status: COMPLETED | OUTPATIENT
Start: 2025-02-06 | End: 2025-02-06

## 2025-02-06 NOTE — PROGRESS NOTES
[facility]   part of [facility] Infectious Disease Consult    Godwin Fonseca Patient Status:  Inpatient    1980 MRN [id number]   Location [facility] 7NE-A Attending Ria Gutierrez MD   Hosp Day # 4 PCP MD Godwin Gallegos seen and examined,   Afebrile,   Previous entries noted,   Comfortable,     History:  Past Medical History:    Anxiety state    Arrhythmia    Asthma ([facility])    Attention deficit hyperactivity disorder (ADHD)    Back problem    Bipolar 1 disorder ([facility])    CKD (chronic kidney disease) stage 3, GFR 30-59 ml/min ([facility])    Dr Meeks    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[facility])    Congestive heart disease ([facility])    COPD (chronic obstructive pulmonary disease) ([facility])    Coronary atherosclerosis    Deep vein thrombosis ([facility])    at age 17 R/T cast    Depression    Diabetes ([facility])    Dialysis patient    Diverticulosis of large intestine    Essential hypertension    3/21 echo: severe concentric LVH with normal EF and no MR or pHTN    Extrinsic asthma, unspecified    Heart attack ([facility])    - angiogram- no intervention    Heart valve disease    mitral valve repair in /    High blood pressure    High cholesterol    History of blood transfusion    History of mitral valve repair    Hyperlipidemia    Low back pain    tight and stiff after sweeping and mopping    LVH (left ventricular hypertrophy)    Migraines    Mixed hyperlipidemia     HDL 38 LDL 97 VLDL 57     Monoclonal gammopathy    IgG kappa     Muscle weakness    MVP (mitral valve prolapse)    Repair  at [place]; echoes as recently as 3/21 show mild or trivial MR and no stenosis    Neuropathy    Osteoarthritis    hip ,knees    Pneumonia due to organism    Pulmonary embolism ([facility])    Renal disorder    Stroke ([facility])    TIA (transient ischemic attack)    Initial history of left-sided weakness and slurred speech. (+) cocaine. MRI of the brain, CT angiogram of the head and neck, and 2D echo are all unremarkable.      TMJ (dislocation of temporomandibular joint)    Troponin level elevated    Trop 60 60 47 with TIA and no CP: Lexiscan negative with EF 51    Visual impairment     Past Surgical History:   Procedure Laterality Date    Av fistula revision, open Left     Cabg      Colonoscopy N/A 03/26/2023    Procedure: COLONOSCOPY;  Surgeon: Heath Vu MD;  Location:  ENDOSCOPY    Colonoscopy N/A 12/30/2023    Procedure: COLONOSCOPY with cold snare polypectomy and forcep polypectomy;  Surgeon: Ousmane Suarez MD;  Location:  ENDOSCOPY    Colonoscopy & polypectomy  2019    Egd  2019    Duodenitis. Biopsied. EUS for weight loss was negative    Heart surgery      Hernia surgery  08/17/2022    Dr Barnes    Laminectomy,>2 sgmt,lumbar  09/06/2018    L4-L5 Decomp Discectomy ROEM L4-L5    Mitralplasty w cp bypass  1994    [place]: Repair    Repair rotator cuff,chronic Left     torn and had a ruptured bicep    Sinus surgery        Spine surgery procedure unlisted      Valve repair  1994    mitral valve     Family History   Problem Relation Age of Onset    Hypertension Father     Alcohol and Other Disorders Associated Father     Substance Abuse Father         cocaine    Dementia Father     Cancer Father         lung    Diabetes Mother     Cancer Mother         multiple myeloma    Hypertension Mother     Anxiety Maternal Aunt     Depression Maternal Aunt     Anxiety Maternal Aunt     Depression Maternal Aunt     Bipolar Disorder Maternal Aunt     Diabetes Maternal Grandmother     Hypertension Maternal Grandmother     Cancer Maternal Grandfather         stomach cancer    Diabetes Maternal Grandfather     Hypertension Maternal Grandfather     Alcohol and Other Disorders Associated Maternal Grandfather     Hypertension Paternal Grandmother     Hypertension Paternal Grandfather     Cancer Sister         uterine and ovarian    Hypertension Sister     Cancer Maternal Uncle         lung    Cancer Paternal Aunt         throat      reports  that he quit smoking about 2 years ago. His smoking use included cigarettes. He started smoking about 29 years ago. He has a 27 pack-year smoking history. He has never been exposed to tobacco smoke. He has never used smokeless tobacco. He reports that he does not drink alcohol and does not use drugs.    Allergies:  Allergies[1]    Medications:    Current Facility-Administered Medications:     naloxone (Narcan) 0.4 MG/ML injection 0.08 mg, 0.08 mg, Intravenous, PRN    albuterol (Ventolin HFA) 108 (90 Base) MCG/ACT inhaler 2 puff, 2 puff, Inhalation, Q6H PRN    ARIPiprazole (Abilify) tab 15 mg, 15 mg, Oral, Daily    buPROPion ER (Wellbutrin XL) 24 hr tab 150 mg, 150 mg, Oral, Daily    calcium acetate (Phoslo) cap 667 mg, 667 mg, Oral, TID CC    carvedilol (Coreg) tab 25 mg, 25 mg, Oral, BID with meals    cloNIDine (Catapres) tab 0.1 mg, 0.1 mg, Oral, Nightly    FLUoxetine (PROzac) cap 40 mg, 40 mg, Oral, Daily    hydrALAZINE (Apresoline) tab 25 mg, 25 mg, Oral, TID    HYDROcodone-acetaminophen (Norco)  MG per tab 1 tablet, 1 tablet, Oral, Q6H PRN    lamoTRIgine (LaMICtal) tab 100 mg, 100 mg, Oral, Daily    losartan (Cozaar) tab 100 mg, 100 mg, Oral, Daily    NIFEdipine ER (Procardia-XL) 24 hr tab 30 mg, 30 mg, Oral, Daily    sevelamer carbonate (Renvela) tab 2,400 mg, 2,400 mg, Oral, TID CC    tamsulosin (Flomax) cap 0.4 mg, 0.4 mg, Oral, Daily    heparin (Porcine) 5000 UNIT/ML injection 5,000 Units, 5,000 Units, Subcutaneous, Q8H MARTHA    melatonin tab 3 mg, 3 mg, Oral, Nightly PRN    polyethylene glycol (PEG 3350) (Miralax) 17 g oral packet 17 g, 17 g, Oral, Daily PRN    sennosides (Senokot) tab 17.2 mg, 17.2 mg, Oral, Nightly PRN    bisacodyl (Dulcolax) 10 MG rectal suppository 10 mg, 10 mg, Rectal, Daily PRN    ondansetron (Zofran) 4 MG/2ML injection 4 mg, 4 mg, Intravenous, Q6H PRN    prochlorperazine (Compazine) 10 MG/2ML injection 5 mg, 5 mg, Intravenous, Q8H PRN    diphenhydrAMINE (Benadryl) cap/tab 25 mg,  25 mg, Oral, Q6H PRN    Vancomycin: PHARMACY DOSING, 1 each, Intravenous, See Admin Instructions (RX holding)    Review of Systems:   Constitutional: Negative for anorexia, chills, fatigue, fevers, malaise, night sweats and weight loss.  Eyes: Negative for visual disturbance, irritation and redness.  Ears, nose, mouth, throat, and face: Negative for hearing loss, tinnitus, nasal congestion, snoring, sore throat, hoarseness and voice change.  Respiratory: Negative for cough, sputum, hemoptysis, chest pain, wheezing, dyspnea on exertion, or stridor.  Cardiovascular: Negative for chest pain, palpitations, irregular heart beats, syncope, fatigue, orthopnea, paroxysmal nocturnal dyspnea, lower extremity edema.  Gastrointestinal: Negative for dysphagia, odynophagia, reflux symptoms, nausea, vomiting, change in bowel habits, diarrhea, constipation and abdominal pain.  Integument/breast: Negative for rash, skin lesions, and pruritus.  Hematologic/lymphatic: Negative for easy bruising, bleeding, and lymphadenopathy.  Musculoskeletal: Negative for myalgias, arthralgias, muscle weakness.  Neurological: Negative for headaches, dizziness, seizures, memory problems, trouble swallowing, speech problems, gait problems and weakness.  Behavioral/Psych: Negative for active tobacco use.  Endocrine: No history of of diabetes, thyroid disorder.  All other review of systems are negative.    Vital signs in last 24 hours:  Patient Vitals for the past 24 hrs:   BP Temp Temp src Pulse Resp SpO2   02/06/25 1300 120/88 97.9 °F (36.6 °C) Oral 86 20 97 %   02/06/25 0800 (!) 150/101 98.1 °F (36.7 °C) Oral 90 20 98 %   02/06/25 0430 124/88 98 °F (36.7 °C) Oral 86 20 95 %   02/06/25 0000 95/59 97.9 °F (36.6 °C) Axillary 93 14 96 %   02/05/25 2000 118/73 97.7 °F (36.5 °C) Oral 89 20 95 %   02/05/25 1735 (!) 145/96 98.1 °F (36.7 °C) Oral 83 18 95 %       Physical Exam:   General: alert, cooperative, oriented.  No respiratory distress.   Head:  Normocephalic, without obvious abnormality, atraumatic.   Eyes: Conjunctivae/corneas clear.  No scleral icterus.  No conjunctival     hemorrhage.   Nose: Nares normal.   Throat: Lips, mucosa, and tongue normal.  No thrush noted.   Neck: Soft, supple neck; trachea midline, no adenopathy, no thyromegaly.   Lungs: CTAB, normal and equal bilateral chest rise   Chest wall: No tenderness or deformity.   Heart: Regular rate and rhythm, normal S1S2, no murmur.   Abdomen: soft, non-tender, non-distended, no masses, no guarding, no     rebound, positive BS.   Extremity: no edema, no cyanosis   Skin: No rashes or lesions.   Neurological: Alert, interactive, no focal deficits    Labs:  Lab Results   Component Value Date    CREATSERUM 6.58 02/06/2025    BUN 31 02/06/2025     02/06/2025    K 3.7 02/06/2025     02/06/2025    CO2 25.0 02/06/2025     02/06/2025    CA 8.5 02/06/2025    ALB 3.8 02/06/2025    MG 2.1 02/06/2025    PHOS 5.6 02/06/2025       Radiology:  Reviewed       Cultures:  Reviewed,     Assessment and Plan:    Fever and Body aches: after missing HD as OP,  - Secondary to seasonal Corona Virus, Supportive care,     2.    Hx of CoNS Bacteremia during last admission, when blood cul with staph Epi on 1/18/25  - While this was in the setting of HD catheter, bacteremia cleared quickly  - Patient was discharged on 2 additional weeks of IV vancomycin dosed with HD through 2/1/25  - Seems he missed some of the doses of IV Vanc as did not get HD,   - Repeat blood cul are negative, HD Cathter removed, 2/03 Tip cul negative,     3.     Hx of Severe MR with MVR: TTE during last admission with no vegetation,   - As bacteremia cleared fast, less likely to be PVE,   - Will get blood cul off of abx though,      4.  Disposition - inpatient. A middle aged male with fever due to Viral infection, also with hx of CoNS Bacteremia, ? Line related, Catheter tip cul are NGTD, Repeat blood cul NGTD,   - Repeat blood cul off  of abx, Can be done as OP in a week,   - DC IV Vanc after tomorrow dose,    - Supportive care,     Discussed with patient, RN, all questions answered, stable per ID, ID will sign off, Please call if any questions. Please follow up with ID in 10-14 days, Call to make appt,     Thank you for consulting [facility] ID for Godwin Fonseca.  If you have any questions or concerns please call [facility] Infectious Disease at [phone].     Malissa Monk MD  2/6/2025  3:29 PM         [1]   Allergies  Allergen Reactions    Hydrochlorothiazide RASH and HIVES    Fentanyl ITCHING and NAUSEA ONLY

## 2025-02-06 NOTE — PAYOR COMM NOTE
--------------  CONTINUED STAY REVIEW---CLINICALS FOR 2/6---PLEASE NOTE PATIENT IS STILL IN HOUSE      Payor: [employer]  Subscriber #:  [id number]  Authorization Number: [id number]    Admit date: 2/2/25  Admit time: 10:59 AM            Date of Service: 2/6/2025  8:37 AM     Signed         Assessment & Plan:    A/P: 43 y/o M w/ PMHx of ESRD MWF, HTN, Severe MR s/p MVR, HFpEF, Hx of Prior DVT/PE, TIA, Bipolar Disorder, Recurrent prior GI bleeds who presents to the hospital after missing 2 dialysis sessions, fevers, body aches     Missed Dialysis  Hx of ESRD MWF  Hyperphosphatemia  Plan:  - Nephrology managing dialysis  - s/p perm cath removal from IR, cultures taken from tip  - Continue home meds, Sevalamer increased by Nephro     Hypertensive Urgency - resolved  HTN  Plan:  - Resume home meds, Coreg, Clonidine, Hydralazine, Losartan, Nifedipine  - PRN Hydralazine     Recent Staph Epi bacteremia  Current Coronavirus URI  Repeat Bcx: ngtd  Plan:  - ID consulted, continue IV Vanc for now, defer to ID for duration     BPD  - Resume home meds     BPH  - Flomax     DVT Ppx: SQH     Subjective:      Patient seen and examined this morning at bedside.   Says he didn't sleep well  Says he is having neck and back pain  Afebrile        Vital signs:  Temp:  [97.7 °F (36.5 °C)-98.3 °F (36.8 °C)] 98 °F (36.7 °C)  Pulse:  [83-93] 86  Resp:  [14-20] 20  BP: ()/(59-96) 124/88  SpO2:  [95 %-96 %] 95 %     Physical Exam:    General: No acute distress.   Respiratory: Clear to auscultation bilaterally. No wheezes.  Cardiovascular: S1, S2. Regular rate and rhythm  Abdomen: Soft, nontender, nondistended.  Positive bowel sounds. No rebound or guarding.  Extremities: No edema.  Neuro:  Grossly non focal, no motor deficits.       Diagnostic Data:    Pertinent Labs and Imaging independently reviewed  Comments:  Bmp noted              Ria Gutierrez MD  [facility]                              MEDICATIONS ADMINISTERED IN  LAST 1 DAY:  buPROPion ER (Wellbutrin XL) 24 hr tab 150 mg       Date Action Dose Route User    2/6/2025 0937 Given 150 mg Oral Phuong Crowley RN          calcium acetate (Phoslo) cap 667 mg       Date Action Dose Route User    2/6/2025 1300 Given 667 mg Oral Phuong Crowley RN    2/6/2025 0937 Given 667 mg Oral Phuong Crowley RN    2/5/2025 1812 Given 667 mg Oral Lizzy Ingram RN          carvedilol (Coreg) tab 25 mg       Date Action Dose Route User    2/6/2025 0937 Given 25 mg Oral Phuong Crowley RN    2/5/2025 2210 Given 25 mg Oral Sandra Valdez RN          cloNIDine (Catapres) tab 0.1 mg       Date Action Dose Route User    2/5/2025 2156 Given 0.1 mg Oral Sandra Valdez RN          FLUoxetine (PROzac) cap 40 mg       Date Action Dose Route User    2/6/2025 0937 Given 40 mg Oral Phuong Crowley RN          heparin (Porcine) 5000 UNIT/ML injection 5,000 Units       Date Action Dose Route User    2/6/2025 0938 Given 5,000 Units Subcutaneous (Right Lower Abdomen) Phuong Crowley RN    2/6/2025 0141 Given 5,000 Units Subcutaneous (Left Lower Abdomen) Sandra Valdez RN    2/5/2025 1812 Given 5,000 Units Subcutaneous (Right Lower Abdomen) Lizzy Ingram RN          hydrALAZINE (Apresoline) tab 25 mg       Date Action Dose Route User    2/6/2025 0938 Given 25 mg Oral Phuong Crowley RN    2/5/2025 2155 Given 25 mg Oral Sandra Valdez RN          HYDROcodone-acetaminophen (Norco)  MG per tab 1 tablet       Date Action Dose Route User    2/6/2025 1303 Given 1 tablet Oral Phuong Crowley RN    2/5/2025 1818 Given 1 tablet Oral Lizzy Ingram RN          HYDROmorphone (Dilaudid) 1 MG/ML injection 0.5 mg       Date Action Dose Route User    2/6/2025 0142 Given 0.5 mg Intravenous Sandra Valdez JASON          lamoTRIgine (LaMICtal) tab 100 mg       Date Action Dose Route User    2/6/2025 0937 Given 100 mg Oral Phuong Crowley RN          losartan (Cozaar) tab 100 mg       Date Action  Dose Route User    2/6/2025 0937 Given 100 mg Oral Phuong Crowley RN          NIFEdipine ER (Procardia-XL) 24 hr tab 30 mg       Date Action Dose Route User    2/6/2025 0937 Given 30 mg Oral Phuong Crowley RN          sevelamer carbonate (Renvela) tab 2,400 mg       Date Action Dose Route User    2/6/2025 1300 Given 2,400 mg Oral Phuong Crowley RN    2/6/2025 0937 Given 2,400 mg Oral Phuong Crowley RN    2/5/2025 1811 Given 2,400 mg Oral Lizzy Ingram RN          tamsulosin (Flomax) cap 0.4 mg       Date Action Dose Route User    2/6/2025 0937 Given 0.4 mg Oral Phuong Crowley RN          vancomycin (Vancocin) 750 mg in sodium chloride 0.9% 250 mL IVPB-ADDV       Date Action Dose Route User    2/5/2025 2157 New Bag 750 mg Intravenous Sandra Valdez RN          ARIPiprazole (Abilify) tab 15 mg       Date Action Dose Route User    2/6/2025 0939 Given 15 mg Oral Phuong Crowley RN            Vitals (last day)       Date/Time Temp Pulse Resp BP SpO2 Weight O2 Device O2 Flow Rate (L/min) [facility]    02/06/25 1300 97.9 °F (36.6 °C) 86 20 120/88 97 % -- None (Room air) -- KR    02/06/25 0800 98.1 °F (36.7 °C) 90 20 150/101 98 % -- None (Room air) -- KR    02/06/25 0430 98 °F (36.7 °C) 86 20 124/88 95 % -- None (Room air) -- DR    02/06/25 0000 97.9 °F (36.6 °C) 93 14 95/59 96 % -- None (Room air) -- DR    02/05/25 2000 97.7 °F (36.5 °C) 89 20 118/73 95 % -- None (Room air) -- DR    02/05/25 1735 98.1 °F (36.7 °C) 83 18 145/96 95 % -- None (Room air) -- ID    02/05/25 1300 98.3 °F (36.8 °C) 83 18 148/92 96 % -- None (Room air) -- ID    02/05/25 0815 98 °F (36.7 °C) 91 14 154/112 -- -- None (Room air) -- ID    02/05/25 0445 98 °F (36.7 °C) 84 15 128/74 -- -- None (Room air) -- RP    02/05/25 0100 98.3 °F (36.8 °C) 91 14 151/84 -- -- None (Room air) -- RP

## 2025-02-06 NOTE — PROGRESS NOTES
[facility]   part of [facility] Hospitalist Progress Note     Godwin Fonseca Patient Status:  Inpatient    1980 MRN [id number]   Location [facility] 7NE-A Attending Jagdeep Joseph, DO   Hosp Day # 4 PCP Adrian Small MD     Assessment & Plan:    A/P: 43 y/o M w/ PMHx of ESRD MWF, HTN, Severe MR s/p MVR, HFpEF, Hx of Prior DVT/PE, TIA, Bipolar Disorder, Recurrent prior GI bleeds who presents to the hospital after missing 2 dialysis sessions, fevers, body aches     Missed Dialysis  Hx of ESRD MWF  Hyperphosphatemia  Plan:  - Nephrology managing dialysis  - s/p perm cath removal from IR, cultures taken from tip  - Continue home meds, Sevalamer increased by Nephro     Hypertensive Urgency - resolved  HTN  Plan:  - Resume home meds, Coreg, Clonidine, Hydralazine, Losartan, Nifedipine  - PRN Hydralazine     Recent Staph Epi bacteremia  Current Coronavirus URI  Repeat Bcx: ngtd  Plan:  - ID consulted, continue IV Vanc for now, defer to ID for duration     BPD  - Resume home meds     BPH  - Flomax    DVT Ppx: SQH    Subjective:     Patient seen and examined this morning at bedside.   Says he didn't sleep well  Says he is having neck and back pain  Afebrile      Vital signs:  Temp:  [97.7 °F (36.5 °C)-98.3 °F (36.8 °C)] 98 °F (36.7 °C)  Pulse:  [83-93] 86  Resp:  [14-20] 20  BP: ()/(59-96) 124/88  SpO2:  [95 %-96 %] 95 %    Physical Exam:    General: No acute distress.   Respiratory: Clear to auscultation bilaterally. No wheezes.  Cardiovascular: S1, S2. Regular rate and rhythm  Abdomen: Soft, nontender, nondistended.  Positive bowel sounds. No rebound or guarding.  Extremities: No edema.  Neuro:  Grossly non focal, no motor deficits.      Diagnostic Data:    Pertinent Labs and Imaging independently reviewed  Comments:  [facility] noted          Ria Gutierrez MD  [facility] Hospitalist  Pager [phone]  Answering Service number: [phone]

## 2025-02-06 NOTE — PLAN OF CARE
Assumed care at 0730  A/O x4   PRN pain meds given   RA  Call light within reach fall precautions in place  Pt updated on POC. All questions answered

## 2025-02-06 NOTE — PROGRESS NOTES
[facility] - Nephrology  Inpatient Follow-up    Godwin Fonseca Patient Status:  Inpatient    1980 MRN [id number]   Formerly [facility] 7NE-A Attending Jagdeep Joseph, DO   Hosp Day # 4 PCP Adrian Small MD       SUBJECTIVE:     Patient seen and examined at bedside. No acute complaints today.     MEDICATIONS:     Current Facility-Administered Medications   Medication Dose Route Frequency    naloxone (Narcan) 0.4 MG/ML injection 0.08 mg  0.08 mg Intravenous PRN    albuterol (Ventolin HFA) 108 (90 Base) MCG/ACT inhaler 2 puff  2 puff Inhalation Q6H PRN    ARIPiprazole (Abilify) tab 15 mg  15 mg Oral Daily    buPROPion ER (Wellbutrin XL) 24 hr tab 150 mg  150 mg Oral Daily    calcium acetate (Phoslo) cap 667 mg  667 mg Oral TID CC    carvedilol (Coreg) tab 25 mg  25 mg Oral BID with meals    cloNIDine (Catapres) tab 0.1 mg  0.1 mg Oral Nightly    FLUoxetine (PROzac) cap 40 mg  40 mg Oral Daily    hydrALAZINE (Apresoline) tab 25 mg  25 mg Oral TID    HYDROcodone-acetaminophen (Norco)  MG per tab 1 tablet  1 tablet Oral Q6H PRN    lamoTRIgine (LaMICtal) tab 100 mg  100 mg Oral Daily    losartan (Cozaar) tab 100 mg  100 mg Oral Daily    NIFEdipine ER (Procardia-XL) 24 hr tab 30 mg  30 mg Oral Daily    sevelamer carbonate (Renvela) tab 2,400 mg  2,400 mg Oral TID CC    tamsulosin (Flomax) cap 0.4 mg  0.4 mg Oral Daily    heparin (Porcine) 5000 UNIT/ML injection 5,000 Units  5,000 Units Subcutaneous Q8H MARTHA    melatonin tab 3 mg  3 mg Oral Nightly PRN    polyethylene glycol (PEG 3350) (Miralax) 17 g oral packet 17 g  17 g Oral Daily PRN    sennosides (Senokot) tab 17.2 mg  17.2 mg Oral Nightly PRN    bisacodyl (Dulcolax) 10 MG rectal suppository 10 mg  10 mg Rectal Daily PRN    ondansetron (Zofran) 4 MG/2ML injection 4 mg  4 mg Intravenous Q6H PRN    prochlorperazine (Compazine) 10 MG/2ML injection 5 mg  5 mg Intravenous Q8H PRN    diphenhydrAMINE (Benadryl) cap/tab 25 mg  25 mg Oral Q6H PRN     Vancomycin: PHARMACY DOSING  1 each Intravenous See Admin Instructions (RX holding)       PHYSICAL EXAM:     Vital Signs: BP (!) 150/101 (BP Location: Right arm)   Pulse 90   Temp 98.1 °F (36.7 °C) (Oral)   Resp 20   Wt 246 lb (111.6 kg)   SpO2 98%   BMI 31.58 kg/m²   Temp (24hrs), Av °F (36.7 °C), Min:97.7 °F (36.5 °C), Max:98.3 °F (36.8 °C)       Intake/Output Summary (Last 24 hours) at 2025 1228  Last data filed at 2025 2200  Gross per 24 hour   Intake 250 ml   Output 3450 ml   Net -3200 ml     Wt Readings from Last 3 Encounters:   25 246 lb (111.6 kg)   25 246 lb 14.6 oz (112 kg)   25 244 lb 11.4 oz (111 kg)       General: no acute distress  HEENT: normocephalic, atraumatic  CV: RRR  Respiratory: no distress  Extremities: no edema bilaterally  Neuro: awake, alert     LABORATORY DATA:     Lab Results   Component Value Date     (H) 2025    BUN 31 (H) 2025    BUNCREA 13.0 2022    CREATSERUM 6.58 (H) 2025    ANIONGAP 13 2025    GFR 59 (L) 2018    GFRNAA 30 (L) 2022    GFRAA 35 (L) 2022    CA 8.5 (L) 2025    OSMOCALC 300 (H) 2025    ALKPHO 90 2025    AST 45 (H) 2025    ALT 17 2025    BILT 0.5 2025    TP 7.4 2025    ALB 3.8 2025    GLOBULIN 3.0 2025     2025    K 3.7 2025     2025    CO2 25.0 2025     Lab Results   Component Value Date    WBC 5.5 2025    RBC 3.15 (L) 2025    HGB 10.0 (L) 2025    HCT 30.0 (L) 2025    .0 2025    MPV 11.5 2012    MCV 95.2 2025    MCH 31.7 2025    MCHC 33.3 2025    RDW 16.1 2025    NEPRELIM 2.99 2025    NEPERCENT 54.5 2025    LYPERCENT 20.9 2025    MOPERCENT 13.1 2025    EOPERCENT 9.5 2025    BAPERCENT 1.8 2025    NE 2.99 2025    LYMABS 1.15 2025    MOABSO 0.72 2025    EOABSO 0.52 2025     BAABSO 0.10 02/03/2025     Lab Results   Component Value Date    MALBP 167.00 05/24/2023    CREUR 142.00 05/24/2023    CREUR 142.00 05/24/2023     Lab Results   Component Value Date    COLORUR Light-Yellow 11/17/2024    CLARITY Clear 11/17/2024    SPECGRAVITY 1.013 11/17/2024    GLUUR Normal 11/17/2024    BILUR Negative 11/17/2024    KETUR Negative 11/17/2024    BLOODURINE Negative 11/17/2024    PHURINE 8.5 (H) 11/17/2024    PROUR 100 (A) 11/17/2024    UROBILINOGEN Normal 11/17/2024    NITRITE Negative 11/17/2024    LEUUR Negative 11/17/2024    WBCUR 1-5 11/17/2024    RBCUR 0-2 11/17/2024    EPIUR Few (A) 11/17/2024    BACUR Rare (A) 11/17/2024    CAOXUR Occasional (A) 04/20/2018    HYLUR Present (A) 05/14/2019       IMAGING:     Reviewed    ASSESSMENT/PLAN:     44 year old  male w ho ESRD on HD MWF with LUE AVF (but also still have RIJ tunnelled HD catheter), HTN, severe MR s/p MVR x2, HFpEF, DVT/PE, TIA, MGUS, bipolar disorder, recurrent GI bleed who presents with missed HD treatment x 2, fever, body aches.       ESRD on HD  -- HD MWF per schedule  -- LUE AVF  -- R perm cath removed 2/3     MBD / Hyperphosphatemia  -- increased sevelamer to 2400 TID AC  -- ca acetate 667 tid ac  -- reiterated low phos diet     HTN:  -- coreg and losartan pre-HD  -- takes remaining hypertensives pre-HD if systolic > 180 but should take clonidine consistently to avoid rebound     Fevers / recent bacteremia  -- Antibiotics per ID        We will continue to follow.    Thank you for allowing us to participate in the care of this patient.       DO Kevin Hallman [facility] - Nephrology

## 2025-02-06 NOTE — PLAN OF CARE
Assumed care at approximately 1930   A & O x 4  RA  NSR on tele   HD completed, 3 L out  BM x 1  IV vanco  Denies pain  Call light within reach   Patient updated on plan of care

## 2025-02-07 VITALS
OXYGEN SATURATION: 100 % | BODY MASS INDEX: 32 KG/M2 | HEART RATE: 78 BPM | SYSTOLIC BLOOD PRESSURE: 150 MMHG | TEMPERATURE: 97 F | RESPIRATION RATE: 21 BRPM | WEIGHT: 246 LBS | DIASTOLIC BLOOD PRESSURE: 105 MMHG

## 2025-02-07 LAB
ALBUMIN SERPL-MCNC: 3.7 G/DL (ref 3.2–4.8)
ANION GAP SERPL CALC-SCNC: 14 MMOL/L (ref 0–18)
BUN BLD-MCNC: 37 MG/DL (ref 9–23)
CALCIUM BLD-MCNC: 8.6 MG/DL (ref 8.7–10.6)
CHLORIDE SERPL-SCNC: 103 MMOL/L (ref 98–112)
CO2 SERPL-SCNC: 22 MMOL/L (ref 21–32)
CREAT BLD-MCNC: 8.09 MG/DL
EGFRCR SERPLBLD CKD-EPI 2021: 8 ML/MIN/1.73M2 (ref 60–?)
GLUCOSE BLD-MCNC: 133 MG/DL (ref 70–99)
MAGNESIUM SERPL-MCNC: 2.3 MG/DL (ref 1.6–2.6)
OSMOLALITY SERPL CALC.SUM OF ELEC: 299 MOSM/KG (ref 275–295)
PHOSPHATE SERPL-MCNC: 6.4 MG/DL (ref 2.4–5.1)
POTASSIUM SERPL-SCNC: 4.2 MMOL/L (ref 3.5–5.1)
SODIUM SERPL-SCNC: 139 MMOL/L (ref 136–145)
VANCOMYCIN SERPL-MCNC: 18.9 UG/ML (ref ?–40)

## 2025-02-07 PROCEDURE — 80202 ASSAY OF VANCOMYCIN: CPT | Performed by: INTERNAL MEDICINE

## 2025-02-07 PROCEDURE — 90935 HEMODIALYSIS ONE EVALUATION: CPT

## 2025-02-07 PROCEDURE — 80069 RENAL FUNCTION PANEL: CPT | Performed by: INTERNAL MEDICINE

## 2025-02-07 PROCEDURE — 83735 ASSAY OF MAGNESIUM: CPT | Performed by: INTERNAL MEDICINE

## 2025-02-07 RX ORDER — HYDROCODONE BITARTRATE AND ACETAMINOPHEN 5; 325 MG/1; MG/1
1 TABLET ORAL ONCE
Status: DISCONTINUED | OUTPATIENT
Start: 2025-02-07 | End: 2025-02-07

## 2025-02-07 RX ORDER — ONDANSETRON 4 MG/1
4 TABLET, ORALLY DISINTEGRATING ORAL EVERY 8 HOURS PRN
COMMUNITY

## 2025-02-07 NOTE — PROGRESS NOTES
[facility]   part of [facility] Hospitalist Progress Note     Godwin Fonseca Patient Status:  Inpatient    1980 MRN [id number]   Location [facility] 7NE-A Attending Jgadeep Joseph, DO   Hosp Day # 5 PCP Adrian Small MD     Assessment & Plan:    A/P: 45 y/o M w/ PMHx of ESRD MWF, HTN, Severe MR s/p MVR, HFpEF, Hx of Prior DVT/PE, TIA, Bipolar Disorder, Recurrent prior GI bleeds who presents to the hospital after missing 2 dialysis sessions, fevers, body aches     Missed Dialysis  Hx of ESRD MWF  Hyperphosphatemia  Plan:  - Nephrology managing dialysis  - s/p perm cath removal from IR, cultures taken from tip >> neg to date  - Continue home meds, Sevalamer increased by Nephro     Hypertensive Urgency - resolved  HTN  Plan:  - Resume home meds, Coreg, Clonidine, Hydralazine, Losartan, Nifedipine  - PRN Hydralazine     Recent Staph Epi bacteremia  Current Coronavirus URI  Repeat Bcx: ngtd  Plan:  - ID consulted, continue IV Vanc for now, defer to ID for duration >> per dr lovelace >> stop vanco after today  - no recs for PO abx at this time  - will await final ID recs     BPD  - Resume home meds     BPH  - Flomax    DVT Ppx: SQH    DISPO  Dc planning in process  ID and nephro following  Cleared for dc by ID  Await recs per nephro  Possible dc home today        Subjective:     Patient seen and examined this morning at bedside.   Says he didn't sleep well  Says he is having neck and back pain  Afebrile      Vital signs:  Temp:  [97.4 °F (36.3 °C)-98.1 °F (36.7 °C)] 97.8 °F (36.6 °C)  Pulse:  [75-89] 82  Resp:  [14-21] 21  BP: (105-138)/() 129/88  SpO2:  [95 %-98 %] 95 %    Physical Exam:    General: No acute distress.   Respiratory: Clear to auscultation bilaterally. No wheezes.  Cardiovascular: S1, S2. Regular rate and rhythm  Abdomen: Soft, nontender, nondistended.  Positive bowel sounds. No rebound or guarding.  Extremities: No edema.  Neuro:  Grossly non focal, no motor  deficits.      Diagnostic Data:    Pertinent Labs and Imaging independently reviewed  Comments:  Bmp noted          Ria Gutierrez MD  [facility]  Pager [phone]  Answering Service number: [phone]

## 2025-02-07 NOTE — CM/SW NOTE
SW notified by Nephrology of pt's desired HD clinic. Pt to be followed by Dr. Long. SW called [facility] Laura at [phone], left message for SW to call back.     Patient would like to go to: [facility]  [address], [place] [address]  Phone: [phone], Fax: [phone]      Addendum 2:20- SEFERINO notified by MD of medical clearance. SW spoke to HD SW, Laila who also spoke with pt regarding transfer. Request had been sent to HD clinic. HD SW to keep pt updated regarding transferring HD clinics.       JOSH Haynes

## 2025-02-07 NOTE — PLAN OF CARE
Assumed care at at 1930, patient is alert and oriented X4  Ambulatory, VSS, On RA  C/o pain and discomfort to the back pf his neck.  Norco given for pain control  Safety measures in place  Call light within reach  All needs met.

## 2025-02-07 NOTE — PLAN OF CARE
Problem: CARDIOVASCULAR - ADULT  Goal: Maintains optimal cardiac output and hemodynamic stability  Description: INTERVENTIONS:  - Monitor vital signs, rhythm, and trends  - Monitor for bleeding, hypotension and signs of decreased cardiac output  - Evaluate effectiveness of vasoactive medications to optimize hemodynamic stability  - Monitor arterial and/or venous puncture sites for bleeding and/or hematoma  - Assess quality of pulses, skin color and temperature  - Assess for signs of decreased coronary artery perfusion - ex. Angina  - Evaluate fluid balance, assess for edema, trend weights  Outcome: Progressing     Problem: RESPIRATORY - ADULT  Goal: Achieves optimal ventilation and oxygenation  Description: INTERVENTIONS:  - Assess for changes in respiratory status  - Assess for changes in mentation and behavior  - Position to facilitate oxygenation and minimize respiratory effort  - Oxygen supplementation based on oxygen saturation or ABGs  - Provide Smoking Cessation handout, if applicable  - Encourage broncho-pulmonary hygiene including cough, deep breathe, Incentive Spirometry  - Assess the need for suctioning and perform as needed  - Assess and instruct to report SOB or any respiratory difficulty  - Respiratory Therapy support as indicated  - Manage/alleviate anxiety  - Monitor for signs/symptoms of CO2 retention  Outcome: Progressing     Problem: METABOLIC/FLUID AND ELECTROLYTES - ADULT  Goal: Hemodynamic stability and optimal renal function maintained  Description: INTERVENTIONS:  - Monitor labs and assess for signs and symptoms of volume excess or deficit  - Monitor intake, output and patient weight  - Monitor urine specific gravity, serum osmolarity and serum sodium as indicated or ordered  - Monitor response to interventions for patient's volume status, including labs, urine output, blood pressure (other measures as available)  - Encourage oral intake as appropriate  - Instruct patient on fluid and  nutrition restrictions as appropriate  Outcome: Progressing     Problem: HEMATOLOGIC - ADULT  Goal: Maintains hematologic stability  Description: INTERVENTIONS  - Assess for signs and symptoms of bleeding or hemorrhage  - Monitor labs and vital signs for trends  - Administer supportive blood products/factors, fluids and medications as ordered and appropriate  - Administer supportive blood products/factors as ordered and appropriate  Outcome: Progressing

## 2025-02-07 NOTE — PROGRESS NOTES
[facility] - Nephrology  Inpatient Follow-up    Godwin Fonseca Patient Status:  Inpatient    1980 MRN [id number]   LTAC, located within [facility] 7NE-A Attending Jagdeep Joseph, DO   Hosp Day # 5 PCP Adrian Small MD       SUBJECTIVE:     Patient seen and examined at bedside. No acute complaints today.     MEDICATIONS:     Current Facility-Administered Medications   Medication Dose Route Frequency    HYDROcodone-acetaminophen (Norco) 5-325 MG per tab 1 tablet  1 tablet Oral Once    vancomycin (Vancocin) 750 mg in sodium chloride 0.9% 250 mL IVPB-ADDV  7.5 mg/kg Intravenous Once    naloxone (Narcan) 0.4 MG/ML injection 0.08 mg  0.08 mg Intravenous PRN    albuterol (Ventolin HFA) 108 (90 Base) MCG/ACT inhaler 2 puff  2 puff Inhalation Q6H PRN    ARIPiprazole (Abilify) tab 15 mg  15 mg Oral Daily    buPROPion ER (Wellbutrin XL) 24 hr tab 150 mg  150 mg Oral Daily    calcium acetate (Phoslo) cap 667 mg  667 mg Oral TID CC    carvedilol (Coreg) tab 25 mg  25 mg Oral BID with meals    cloNIDine (Catapres) tab 0.1 mg  0.1 mg Oral Nightly    FLUoxetine (PROzac) cap 40 mg  40 mg Oral Daily    hydrALAZINE (Apresoline) tab 25 mg  25 mg Oral TID    HYDROcodone-acetaminophen (Norco)  MG per tab 1 tablet  1 tablet Oral Q6H PRN    lamoTRIgine (LaMICtal) tab 100 mg  100 mg Oral Daily    losartan (Cozaar) tab 100 mg  100 mg Oral Daily    NIFEdipine ER (Procardia-XL) 24 hr tab 30 mg  30 mg Oral Daily    sevelamer carbonate (Renvela) tab 2,400 mg  2,400 mg Oral TID CC    tamsulosin (Flomax) cap 0.4 mg  0.4 mg Oral Daily    heparin (Porcine) 5000 UNIT/ML injection 5,000 Units  5,000 Units Subcutaneous Q8H MARTHA    melatonin tab 3 mg  3 mg Oral Nightly PRN    polyethylene glycol (PEG 3350) (Miralax) 17 g oral packet 17 g  17 g Oral Daily PRN    sennosides (Senokot) tab 17.2 mg  17.2 mg Oral Nightly PRN    bisacodyl (Dulcolax) 10 MG rectal suppository 10 mg  10 mg Rectal Daily PRN    ondansetron (Zofran) 4 MG/2ML injection 4  mg  4 mg Intravenous Q6H PRN    prochlorperazine (Compazine) 10 MG/2ML injection 5 mg  5 mg Intravenous Q8H PRN    diphenhydrAMINE (Benadryl) cap/tab 25 mg  25 mg Oral Q6H PRN       PHYSICAL EXAM:     Vital Signs: /88 (BP Location: Right arm)   Pulse 82   Temp 97.8 °F (36.6 °C) (Oral)   Resp 21   Wt 246 lb (111.6 kg)   SpO2 95%   BMI 31.58 kg/m²   Temp (24hrs), Av.8 °F (36.6 °C), Min:97.4 °F (36.3 °C), Max:98.1 °F (36.7 °C)       Intake/Output Summary (Last 24 hours) at 2025 1044  Last data filed at 2025 1600  Gross per 24 hour   Intake 210 ml   Output --   Net 210 ml     Wt Readings from Last 3 Encounters:   25 246 lb (111.6 kg)   25 246 lb 14.6 oz (112 kg)   25 244 lb 11.4 oz (111 kg)       General: no acute distress  HEENT: normocephalic, atraumatic  CV: RRR  Respiratory: no distress  Extremities: no edema bilaterally  Neuro: awake, alert     LABORATORY DATA:     Lab Results   Component Value Date     (H) 2025    BUN 37 (H) 2025    BUNCREA 13.0 2022    CREATSERUM 8.09 (H) 2025    ANIONGAP 14 2025    GFR 59 (L) 2018    GFRNAA 30 (L) 2022    GFRAA 35 (L) 2022    CA 8.6 (L) 2025    OSMOCALC 299 (H) 2025    ALKPHO 90 2025    AST 45 (H) 2025    ALT 17 2025    BILT 0.5 2025    TP 7.4 2025    ALB 3.7 2025    GLOBULIN 3.0 2025     2025    K 4.2 2025     2025    CO2 22.0 2025     Lab Results   Component Value Date    WBC 5.5 2025    RBC 3.15 (L) 2025    HGB 10.0 (L) 2025    HCT 30.0 (L) 2025    .0 2025    MPV 11.5 2012    MCV 95.2 2025    MCH 31.7 2025    MCHC 33.3 2025    RDW 16.1 2025    NEPRELIM 2.99 2025    NEPERCENT 54.5 2025    LYPERCENT 20.9 2025    MOPERCENT 13.1 2025    EOPERCENT 9.5 2025    BAPERCENT 1.8 2025    NE 2.99  02/03/2025    LYMABS 1.15 02/03/2025    MOABSO 0.72 02/03/2025    EOABSO 0.52 02/03/2025    BAABSO 0.10 02/03/2025     Lab Results   Component Value Date    MALBP 167.00 05/24/2023    CREUR 142.00 05/24/2023    CREUR 142.00 05/24/2023     Lab Results   Component Value Date    COLORUR Light-Yellow 11/17/2024    CLARITY Clear 11/17/2024    SPECGRAVITY 1.013 11/17/2024    GLUUR Normal 11/17/2024    BILUR Negative 11/17/2024    KETUR Negative 11/17/2024    BLOODURINE Negative 11/17/2024    PHURINE 8.5 (H) 11/17/2024    PROUR 100 (A) 11/17/2024    UROBILINOGEN Normal 11/17/2024    NITRITE Negative 11/17/2024    LEUUR Negative 11/17/2024    WBCUR 1-5 11/17/2024    RBCUR 0-2 11/17/2024    EPIUR Few (A) 11/17/2024    BACUR Rare (A) 11/17/2024    CAOXUR Occasional (A) 04/20/2018    HYLUR Present (A) 05/14/2019       IMAGING:     Reviewed    ASSESSMENT/PLAN:     44 year old  male w ho ESRD on HD MWF with LUE AVF (but also still have RIJ tunnelled HD catheter), HTN, severe MR s/p MVR x2, HFpEF, DVT/PE, TIA, MGUS, bipolar disorder, recurrent GI bleed who presents with missed HD treatment x 2, fever, body aches.       ESRD on HD  -- HD MWF per schedule  -- LUE AVF  -- R perm cath removed 2/3  -- SW working on placement to different facility - [facility] under Dr. Long.      MBD / Hyperphosphatemia  -- increased sevelamer to 2400 TID AC  -- ca acetate 667 tid ac  -- reiterated low phos diet     HTN:  -- coreg and losartan pre-HD  -- takes remaining hypertensives pre-HD if systolic > 180 but should take clonidine consistently to avoid rebound     Fevers / recent bacteremia  -- Antibiotics per ID        We will continue to follow.    Thank you for allowing us to participate in the care of this patient.       Samantha Muñoz, DO   [facility] - Nephrology

## 2025-02-07 NOTE — PROGRESS NOTES
[facility] Pharmacy Dosing Service      Follow Up Pharmacokinetic Consult for Vancomycin Dosing     Godwin Fonseca is a 44 year old male who is receiving vancomycin therapy for bacteremia. Patient is currently receiving 750 mg IV  after each HD . The current treatment and monitoring approach is non-AUC strategy.        Weight and Temperature:    Wt Readings from Last 1 Encounters:   02/02/25 111.6 kg (246 lb)         Temp Readings from Last 1 Encounters:   02/07/25 97.6 °F (36.4 °C) (Oral)      Labs:   Recent Labs   Lab 02/05/25  0600 02/06/25  0554 02/07/25  0519   CREATSERUM 9.03* 6.58* 8.09*      Estimated Creatinine Clearance: 13.3 mL/min (A) (based on SCr of 8.09 mg/dL (H)).     Recent Labs   Lab 02/02/25  0702 02/03/25  0554   WBC 8.4 5.5        Vancomycin Levels:  Lab Results   Component Value Date/Time    VANCR 18.9 02/07/2025 05:19 AM    VANCR 17.3 02/05/2025 06:01 AM      The Pharmacokinetic Target is:    Trough/random 15-20 mg/L (applies to IV dosing in HD and IP dosing in APD)    Renal Dosing Considerations:    HD: Home regimen: MWF     Assessment/Plan:   Maintenance Regimen: Give vancomycin 750mg IV x1 dose today after HD. This will complete vancomycin treatment per Dr Mokn's note from 2/6/2025.    Monitoring:   No additional vancomycin levels are needed at this time.      We appreciate the opportunity to assist in the care of this patient.     Keiry Flores, PharmD  2/7/2025  7:29 AM  [facility]armacy Extension: [phone]

## 2025-02-07 NOTE — PAT NURSING NOTE
NURSING DISCHARGE NOTE    Discharged Home via Wheelchair.  Accompanied by Support staff  Belongings Taken by patient/family.      Cleared for discharge by all consults. Discharge AVS completed and reviewed with patient. Discussed discharge medications, including purpose, dose, and side effects. Instructed to  scripts sent to pharmacy. Reviewed follow-ups and the importance of maintaining those appointments. Discussed discharge instructions/precautions and when to notify MD vs seek emergency medical care. All questions and concerns addressed appropriately. Pt demonstrates adequate understanding of all instructions.

## 2025-02-11 NOTE — PROGRESS NOTES
Provider Clarification    Additional information on the cause and effect relationship between the diagnosis of Bacteremia and the Perma cath central line:                    due to or associated with each other     This note is part of the patient's medical record.

## 2025-02-11 NOTE — DISCHARGE SUMMARY
[facility]  HOSPITALIST  DISCHARGE SUMMARY     Godwin Fonseca Patient Status:  Inpatient    1980 MRN [id number]   Location [facility] 7NE-A Attending No att. providers found   Hosp Day # 5 PCP Adrian Small MD     Date of Admission: 2025  Date of Discharge: 2025  Discharge Disposition: Home or Self Care    Discharge Diagnosis:   Missed Dialysis  Hx of ESRD MWF  Hyperphosphatemia  Hypertensive Urgency - resolved  HTN  Recent Staph Epi bacteremia  Current Coronavirus URI  BPD  BPH    History of Present Illness:   This is the story of Mr. Raymundo Connelly who is a 43 y/o M w/ PMHx of ESRD MWF, HTN, Severe MR s/p MVR, HFpEF, Hx of Prior DVT/PE, TIA, Bipolar Disorder, Recurrent prior GI bleeds who presents to the hospital after missing 2 dialysis sessions, fevers, body aches. He was recently admitted to the hospital w/ staph epi bacteremia/viral URI. During that admission he was found to have a line infection and was discharged on IV Vanco until  as per last ID note. His last dose of IV abx was the last time he got dialysis which was on Monday. He states that the dialysis center has NOT been using his HD catheter as he has a LUE fistula. Patient states that he has been trying to transfer to [facility] dialysis which is 5 minutes from his house instead of his current dialysis center which is 20 minutes from his house. This distance is directly contributing to his recurrent readmissions.     Brief Synopsis:     Missed Dialysis  Hx of ESRD MWF  Hyperphosphatemia  Plan:  - Nephrology managing dialysis  - s/p perm cath removal from IR, cultures taken from tip >> neg to date  - Continue home meds, Sevalamer increased by Nephro     Hypertensive Urgency - resolved  HTN  Plan:  - Resume home meds, Coreg, Clonidine, Hydralazine, Losartan, Nifedipine  - PRN Hydralazine     Recent Staph Epi bacteremia  Current Coronavirus URI  Repeat Bcx: ngtd  Plan:  - ID consulted, continue IV Vanc for now, defer to ID for duration >>  per dr lovelace >> stop vanco after today  - no recs for PO abx at this time  - will await final ID recs     BPD  - Resume home meds     BPH  - Flomax    DC INSTRUCTIONS  Dc planning in process  ID and nephro following  Cleared for dc by ID  Await recs per nephro  Possible dc home today       Lace+ Score: 77  59-90 High Risk  29-58 Medium Risk  0-28   Low Risk       TCM Follow-Up Recommendation:  LACE > 58: High Risk of readmission after discharge from the hospital.    Procedures during hospitalization:     - s/p perm cath removal from IR,     Incidental or significant findings and recommendations (brief descriptions):  none    Lab/Test results pending at Discharge:   none    Consultants:  Nephro  ID  IR    Discharge Medication List:     Discharge Medications        CONTINUE taking these medications        Instructions Prescription details   albuterol 108 (90 Base) MCG/ACT Aers  Commonly known as: Ventolin HFA      Inhale 2 puffs into the lungs every 6 (six) hours as needed for Wheezing.   Refills: 0     ARIPiprazole 15 MG Tabs  Commonly known as: Abilify      Take 1 tablet (15 mg total) by mouth daily.   Refills: 0     buPROPion  MG Tb24  Commonly known as: Wellbutrin XL      Take 1 tablet (150 mg total) by mouth daily.   Refills: 0     calcium acetate 667 MG Caps  Commonly known as: Phoslo      Take 1 capsule (667 mg total) by mouth with breakfast, with lunch, and with evening meal.   Refills: 0     carvedilol 25 MG Tabs  Commonly known as: Coreg      Take 1 tablet (25 mg total) by mouth in the morning and 1 tablet (25 mg total) in the evening. Take with meals.   Quantity: 60 tablet  Refills: 6     cloNIDine 0.1 MG Tabs  Commonly known as: Catapres      Take 1 tablet (0.1 mg total) by mouth nightly.   Refills: 0     FLUoxetine HCl 40 MG Caps  Commonly known as: PROZAC      Take 1 capsule (40 mg total) by mouth daily.   Quantity: 7 capsule  Refills: 0     fluticasone propionate 50 MCG/ACT Susp  Commonly known  as: Flonase      SPRAY ONCE INTO EACH NOSTRIL BID PRN   Quantity: 15.8 mL  Refills: 0     gabapentin 100 MG Caps  Commonly known as: Neurontin      Take 2 capsules (200 mg total) by mouth 3 (three) times daily.   Quantity: 180 capsule  Refills: 0     hydrALAZINE 25 MG Tabs  Commonly known as: Apresoline      Take 1 tablet (25 mg total) by mouth 3 (three) times daily.   Refills: 0     HYDROcodone-acetaminophen  MG Tabs  Commonly known as: Norco      Take 1 tablet by mouth every 6 (six) hours as needed for Pain.   Refills: 0     lamoTRIgine 100 MG Tabs  Commonly known as: LaMICtal      Take 1 tablet (100 mg total) by mouth daily.   Refills: 0     lidocaine 5 % Ptch  Commonly known as: Lidoderm      Place 1 patch onto the skin daily.   Quantity: 30 patch  Refills: 0     losartan 100 MG Tabs  Commonly known as: Cozaar      Take 1 tablet (100 mg total) by mouth daily.   Stop taking on: February 20, 2025  Quantity: 30 tablet  Refills: 0     memantine 5 MG Tabs  Commonly known as: Namenda      Take 1 tablet (5 mg total) by mouth 2 (two) times daily.   Refills: 0     NIFEdipine ER 30 MG Tb24  Commonly known as: Adalat CC      Take 1 tablet (30 mg total) by mouth daily. Hold if systolic blood pressure <130   Stop taking on: February 20, 2025  Quantity: 30 tablet  Refills: 0     ondansetron 4 MG Tbdp  Commonly known as: Zofran-ODT      Take 1 tablet (4 mg total) by mouth every 8 (eight) hours as needed for Nausea.   Refills: 0     Renvela 800 MG Tabs  Generic drug: sevelamer carbonate      Take 3 tablets (2,400 mg total) by mouth 3 (three) times daily with meals.   Refills: 0     tamsulosin 0.4 MG Caps  Commonly known as: Flomax      Take 1 capsule (0.4 mg total) by mouth daily.   Refills: 0     Vitamin D 50 MCG (2000 UT) Caps      Take 1 capsule (2,000 Units total) by mouth once a week.   Refills: 0     Vyvanse 60 MG Caps  Generic drug: Lisdexamfetamine Dimesylate      Take 1 capsule (60 mg total) by mouth every  morning.   Refills: 0            STOP taking these medications      Vancomycin HCl in NaCl 1-0.9 GM/250ML-% Soln                 [place] reviewed:   none    Follow-up appointment:   Adrian Arce MD  [address] [facility] [address] [phone]    Follow up in 1 week(s)      Malissa Monk MD  [address]  [address]  [place] [address] [phone]    Follow up in 10 day(s)      Appointments for Next 30 Days 2/11/2025 - 3/13/2025      None            Vital signs:  Temp:  [97.4 °F (36.3 °C)-98.1 °F (36.7 °C)] 97.8 °F (36.6 °C)  Pulse:  [75-89] 82  Resp:  [14-21] 21  BP: (105-138)/() 129/88  SpO2:  [95 %-98 %] 95 %     Physical Exam:    General: No acute distress.   Respiratory: Clear to auscultation bilaterally. No wheezes.  Cardiovascular: S1, S2. Regular rate and rhythm  Abdomen: Soft, nontender, nondistended.  Positive bowel sounds. No rebound or guarding.  Extremities: No edema.  Neuro:  Grossly non focal, no motor deficits.    -----------------------------------------------------------------------------------------------  PATIENT DISCHARGE INSTRUCTIONS: See electronic chart    Ria Gutierrez MD    Time spent:  > 30 minutes

## 2025-03-04 ENCOUNTER — HOSPITAL ENCOUNTER (OUTPATIENT)
Facility: HOSPITAL | Age: 45
Setting detail: OBSERVATION
LOS: 1 days | Discharge: HOME | End: 2025-03-07
Attending: EMERGENCY MEDICINE
Payer: MEDICARE

## 2025-03-04 ENCOUNTER — APPOINTMENT (OUTPATIENT)
Dept: GENERAL RADIOLOGY | Age: 45
End: 2025-03-04
Attending: EMERGENCY MEDICINE
Payer: MEDICARE

## 2025-03-04 DIAGNOSIS — R11.2 NAUSEA AND VOMITING IN ADULT: ICD-10-CM

## 2025-03-04 DIAGNOSIS — J18.9 NOSOCOMIAL PNEUMONIA: Primary | ICD-10-CM

## 2025-03-04 DIAGNOSIS — N18.6 ESRD (END STAGE RENAL DISEASE) (HCC): ICD-10-CM
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
## 2025-03-04 LAB
ALBUMIN SERPL-MCNC: 3.9 G/DL (ref 3.2–4.8)
ALBUMIN/GLOB SERPL: 1.3 {RATIO} (ref 1–2)
ALP LIVER SERPL-CCNC: 97 U/L
ALT SERPL-CCNC: 35 U/L
ANION GAP SERPL CALC-SCNC: 14 MMOL/L (ref 0–18)
AST SERPL-CCNC: 33 U/L (ref ?–34)
BASOPHILS # BLD AUTO: 0.1 X10(3) UL (ref 0–0.2)
BASOPHILS NFR BLD AUTO: 1.2 %
BILIRUB SERPL-MCNC: 0.4 MG/DL (ref 0.3–1.2)
BUN BLD-MCNC: 67 MG/DL (ref 9–23)
CALCIUM BLD-MCNC: 8.7 MG/DL (ref 8.7–10.6)
CHLORIDE SERPL-SCNC: 106 MMOL/L (ref 98–112)
CO2 SERPL-SCNC: 18 MMOL/L (ref 21–32)
CREAT BLD-MCNC: 10.02 MG/DL
EGFRCR SERPLBLD CKD-EPI 2021: 6 ML/MIN/1.73M2 (ref 60–?)
EOSINOPHIL # BLD AUTO: 0.25 X10(3) UL (ref 0–0.7)
EOSINOPHIL NFR BLD AUTO: 3.1 %
ERYTHROCYTE [DISTWIDTH] IN BLOOD BY AUTOMATED COUNT: 14.3 %
GLOBULIN PLAS-MCNC: 3.1 G/DL (ref 2–3.5)
GLUCOSE BLD-MCNC: 189 MG/DL (ref 70–99)
HCT VFR BLD AUTO: 24.9 %
HGB BLD-MCNC: 8.7 G/DL
IMM GRANULOCYTES # BLD AUTO: 0.02 X10(3) UL (ref 0–1)
IMM GRANULOCYTES NFR BLD: 0.2 %
LACTATE SERPL-SCNC: 0.9 MMOL/L (ref 0.5–2)
LYMPHOCYTES # BLD AUTO: 0.8 X10(3) UL (ref 1–4)
LYMPHOCYTES NFR BLD AUTO: 9.8 %
MCH RBC QN AUTO: 32.3 PG (ref 26–34)
MCHC RBC AUTO-ENTMCNC: 34.9 G/DL (ref 31–37)
MCV RBC AUTO: 92.6 FL
MONOCYTES # BLD AUTO: 0.57 X10(3) UL (ref 0.1–1)
MONOCYTES NFR BLD AUTO: 7 %
NEUTROPHILS # BLD AUTO: 6.4 X10 (3) UL (ref 1.5–7.7)
NEUTROPHILS # BLD AUTO: 6.4 X10(3) UL (ref 1.5–7.7)
NEUTROPHILS NFR BLD AUTO: 78.7 %
OSMOLALITY SERPL CALC.SUM OF ELEC: 310 MOSM/KG (ref 275–295)
PLATELET # BLD AUTO: 246 10(3)UL (ref 150–450)
POCT INFLUENZA A: NEGATIVE
POCT INFLUENZA B: NEGATIVE
POTASSIUM SERPL-SCNC: 3.6 MMOL/L (ref 3.5–5.1)
PROCALCITONIN SERPL-MCNC: 0.82 NG/ML (ref ?–0.05)
PROT SERPL-MCNC: 7 G/DL (ref 5.7–8.2)
RBC # BLD AUTO: 2.69 X10(6)UL
SARS-COV-2 RNA RESP QL NAA+PROBE: NOT DETECTED
SODIUM SERPL-SCNC: 138 MMOL/L (ref 136–145)
WBC # BLD AUTO: 8.1 X10(3) UL (ref 4–11)

## 2025-03-04 PROCEDURE — 80053 COMPREHEN METABOLIC PANEL: CPT | Performed by: EMERGENCY MEDICINE

## 2025-03-04 PROCEDURE — 87502 INFLUENZA DNA AMP PROBE: CPT | Performed by: EMERGENCY MEDICINE

## 2025-03-04 PROCEDURE — 96365 THER/PROPH/DIAG IV INF INIT: CPT

## 2025-03-04 PROCEDURE — 83605 ASSAY OF LACTIC ACID: CPT | Performed by: EMERGENCY MEDICINE

## 2025-03-04 PROCEDURE — 71045 X-RAY EXAM CHEST 1 VIEW: CPT | Performed by: EMERGENCY MEDICINE

## 2025-03-04 PROCEDURE — 93005 ELECTROCARDIOGRAM TRACING: CPT

## 2025-03-04 PROCEDURE — 99285 EMERGENCY DEPT VISIT HI MDM: CPT

## 2025-03-04 PROCEDURE — 87040 BLOOD CULTURE FOR BACTERIA: CPT | Performed by: EMERGENCY MEDICINE

## 2025-03-04 PROCEDURE — 36415 COLL VENOUS BLD VENIPUNCTURE: CPT

## 2025-03-04 PROCEDURE — 96376 TX/PRO/DX INJ SAME DRUG ADON: CPT

## 2025-03-04 PROCEDURE — 96375 TX/PRO/DX INJ NEW DRUG ADDON: CPT

## 2025-03-04 PROCEDURE — 84145 PROCALCITONIN (PCT): CPT | Performed by: EMERGENCY MEDICINE

## 2025-03-04 PROCEDURE — 85025 COMPLETE CBC W/AUTO DIFF WBC: CPT | Performed by: EMERGENCY MEDICINE

## 2025-03-04 PROCEDURE — 93010 ELECTROCARDIOGRAM REPORT: CPT

## 2025-03-04 RX ORDER — ALBUMIN (HUMAN) 12.5 G/50ML
25 SOLUTION INTRAVENOUS
Status: ACTIVE | OUTPATIENT
Start: 2025-03-04 | End: 2025-03-06

## 2025-03-04 RX ORDER — ONDANSETRON 2 MG/ML
4 INJECTION INTRAMUSCULAR; INTRAVENOUS ONCE
Status: COMPLETED | OUTPATIENT
Start: 2025-03-04 | End: 2025-03-04

## 2025-03-04 RX ORDER — POLYETHYLENE GLYCOL 3350 17 G/17G
17 POWDER, FOR SOLUTION ORAL DAILY PRN
Status: DISCONTINUED | OUTPATIENT
Start: 2025-03-04 | End: 2025-03-07

## 2025-03-04 RX ORDER — ERGOCALCIFEROL 1.25 MG/1
50000 CAPSULE, LIQUID FILLED ORAL WEEKLY
COMMUNITY

## 2025-03-04 RX ORDER — HYDROMORPHONE HYDROCHLORIDE 1 MG/ML
0.5 INJECTION, SOLUTION INTRAMUSCULAR; INTRAVENOUS; SUBCUTANEOUS ONCE
Status: COMPLETED | OUTPATIENT
Start: 2025-03-04 | End: 2025-03-04

## 2025-03-04 RX ORDER — BUPROPION HYDROCHLORIDE 150 MG/1
150 TABLET ORAL DAILY
Status: DISCONTINUED | OUTPATIENT
Start: 2025-03-05 | End: 2025-03-07

## 2025-03-04 RX ORDER — ALBUTEROL SULFATE 90 UG/1
2 INHALANT RESPIRATORY (INHALATION) EVERY 6 HOURS PRN
Status: DISCONTINUED | OUTPATIENT
Start: 2025-03-04 | End: 2025-03-07

## 2025-03-04 RX ORDER — CHOLECALCIFEROL (VITAMIN D3) 50 MCG
2000 TABLET ORAL WEEKLY
Status: DISCONTINUED | OUTPATIENT
Start: 2025-03-10 | End: 2025-03-07

## 2025-03-04 RX ORDER — NIFEDIPINE 30 MG/1
30 TABLET, EXTENDED RELEASE ORAL DAILY
Status: DISCONTINUED | OUTPATIENT
Start: 2025-03-05 | End: 2025-03-07

## 2025-03-04 RX ORDER — ARIPIPRAZOLE 10 MG/1
10 TABLET ORAL DAILY
COMMUNITY

## 2025-03-04 RX ORDER — LAMOTRIGINE 100 MG/1
100 TABLET ORAL DAILY
Status: DISCONTINUED | OUTPATIENT
Start: 2025-03-05 | End: 2025-03-07

## 2025-03-04 RX ORDER — HYDRALAZINE HYDROCHLORIDE 20 MG/ML
10 INJECTION INTRAMUSCULAR; INTRAVENOUS EVERY 6 HOURS PRN
Status: DISCONTINUED | OUTPATIENT
Start: 2025-03-04 | End: 2025-03-07

## 2025-03-04 RX ORDER — SODIUM CHLORIDE 9 MG/ML
125 INJECTION, SOLUTION INTRAVENOUS CONTINUOUS
Status: DISCONTINUED | OUTPATIENT
Start: 2025-03-04 | End: 2025-03-04

## 2025-03-04 RX ORDER — GABAPENTIN 100 MG/1
200 CAPSULE ORAL 3 TIMES DAILY
Status: DISCONTINUED | OUTPATIENT
Start: 2025-03-04 | End: 2025-03-07

## 2025-03-04 RX ORDER — CARVEDILOL 12.5 MG/1
25 TABLET ORAL 2 TIMES DAILY WITH MEALS
Status: DISCONTINUED | OUTPATIENT
Start: 2025-03-04 | End: 2025-03-07

## 2025-03-04 RX ORDER — ACETAMINOPHEN 500 MG
500 TABLET ORAL EVERY 4 HOURS PRN
Status: DISCONTINUED | OUTPATIENT
Start: 2025-03-04 | End: 2025-03-07

## 2025-03-04 RX ORDER — CALCIUM ACETATE 667 MG/1
667 CAPSULE ORAL
Status: DISCONTINUED | OUTPATIENT
Start: 2025-03-05 | End: 2025-03-07

## 2025-03-04 RX ORDER — HYDRALAZINE HYDROCHLORIDE 20 MG/ML
10 INJECTION INTRAMUSCULAR; INTRAVENOUS EVERY 30 MIN PRN
Status: DISCONTINUED | OUTPATIENT
Start: 2025-03-04 | End: 2025-03-04 | Stop reason: ALTCHOICE

## 2025-03-04 RX ORDER — NIFEDIPINE 30 MG
30 TABLET, EXTENDED RELEASE ORAL DAILY
COMMUNITY
End: 2025-03-07

## 2025-03-04 RX ORDER — SENNOSIDES 8.6 MG
17.2 TABLET ORAL NIGHTLY PRN
Status: DISCONTINUED | OUTPATIENT
Start: 2025-03-04 | End: 2025-03-07

## 2025-03-04 RX ORDER — HEPARIN SODIUM 5000 [USP'U]/ML
5000 INJECTION, SOLUTION INTRAVENOUS; SUBCUTANEOUS EVERY 8 HOURS SCHEDULED
Status: DISCONTINUED | OUTPATIENT
Start: 2025-03-04 | End: 2025-03-07

## 2025-03-04 RX ORDER — ARIPIPRAZOLE 5 MG/1
5 TABLET ORAL DAILY
COMMUNITY

## 2025-03-04 RX ORDER — TAMSULOSIN HYDROCHLORIDE 0.4 MG/1
0.4 CAPSULE ORAL DAILY
Status: DISCONTINUED | OUTPATIENT
Start: 2025-03-05 | End: 2025-03-07

## 2025-03-04 RX ORDER — LOSARTAN POTASSIUM 100 MG/1
100 TABLET ORAL DAILY
COMMUNITY

## 2025-03-04 RX ORDER — CLONIDINE HYDROCHLORIDE 0.1 MG/1
0.1 TABLET ORAL NIGHTLY
Status: DISCONTINUED | OUTPATIENT
Start: 2025-03-04 | End: 2025-03-05

## 2025-03-04 RX ORDER — LISDEXAMFETAMINE DIMESYLATE 30 MG/1
60 CAPSULE ORAL EVERY MORNING
Status: DISCONTINUED | OUTPATIENT
Start: 2025-03-05 | End: 2025-03-07

## 2025-03-04 RX ORDER — BISACODYL 10 MG
10 SUPPOSITORY, RECTAL RECTAL
Status: DISCONTINUED | OUTPATIENT
Start: 2025-03-04 | End: 2025-03-07

## 2025-03-04 RX ORDER — ARIPIPRAZOLE 5 MG/1
15 TABLET ORAL DAILY
Status: DISCONTINUED | OUTPATIENT
Start: 2025-03-05 | End: 2025-03-07

## 2025-03-04 RX ORDER — ONDANSETRON 2 MG/ML
4 INJECTION INTRAMUSCULAR; INTRAVENOUS EVERY 6 HOURS PRN
Status: DISCONTINUED | OUTPATIENT
Start: 2025-03-04 | End: 2025-03-04

## 2025-03-04 RX ORDER — HYDROMORPHONE HYDROCHLORIDE 1 MG/ML
0.5 INJECTION, SOLUTION INTRAMUSCULAR; INTRAVENOUS; SUBCUTANEOUS EVERY 30 MIN PRN
Status: COMPLETED | OUTPATIENT
Start: 2025-03-04 | End: 2025-03-04

## 2025-03-04 RX ORDER — HYDRALAZINE HYDROCHLORIDE 25 MG/1
25 TABLET, FILM COATED ORAL 3 TIMES DAILY
Status: DISCONTINUED | OUTPATIENT
Start: 2025-03-04 | End: 2025-03-07

## 2025-03-04 RX ORDER — PROCHLORPERAZINE EDISYLATE 5 MG/ML
5 INJECTION INTRAMUSCULAR; INTRAVENOUS EVERY 8 HOURS PRN
Status: DISCONTINUED | OUTPATIENT
Start: 2025-03-04 | End: 2025-03-07

## 2025-03-04 RX ORDER — LABETALOL HYDROCHLORIDE 5 MG/ML
20 INJECTION, SOLUTION INTRAVENOUS ONCE
Status: COMPLETED | OUTPATIENT
Start: 2025-03-04 | End: 2025-03-04

## 2025-03-04 NOTE — ED PROVIDER NOTES
Patient Seen in: [facility] In [place]      History     Chief Complaint   Patient presents with    Nausea/Vomiting/Diarrhea     Stated Complaint: nvd since sunday. missed dialysis Monday    Subjective:   HPI      44-year-old male with history of end-stage renal disease on dialysis Monday Wednesday Friday presents for evaluation of vomiting and diarrhea for the past 2 days.  Also describes body aches and headaches.  Slight cough and chest congestion.  No reported fever.  Due to his symptoms he has been unable to take his regular antihypertensive medications.  He also missed dialysis yesterday.  Thus his last dialysis session was Friday.  He feels a little short of breath.  No chest pain    Objective:     Past Medical History:    Anxiety state    Arrhythmia    Asthma ([facility])    Attention deficit hyperactivity disorder (ADHD)    Back problem    Bipolar 1 disorder ([facility])    CKD (chronic kidney disease) stage 3, GFR 30-59 ml/min ([facility])    Dr Meeks    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[facility])    Congestive heart disease ([facility])    COPD (chronic obstructive pulmonary disease) ([facility])    Coronary atherosclerosis    Deep vein thrombosis ([facility])    at age 17 R/T cast    Depression    Diabetes ([facility])    Dialysis patient    Diverticulosis of large intestine    Essential hypertension    3/21 echo: severe concentric LVH with normal EF and no MR or pHTN    Extrinsic asthma, unspecified    Heart attack ([facility])    2016- angiogram- no intervention    Heart valve disease    mitral valve repair in 1994/    High blood pressure    High cholesterol    History of blood transfusion    History of mitral valve repair    Hyperlipidemia    Low back pain    tight and stiff after sweeping and mopping    LVH (left ventricular hypertrophy)    Migraines    Mixed hyperlipidemia     HDL 38 LDL 97 VLDL 57     Monoclonal gammopathy    IgG kappa     Muscle weakness    MVP (mitral valve prolapse)    Repair 1994 at [place]; echoes as recently  as 3/21 show mild or trivial MR and no stenosis    Neuropathy    Osteoarthritis    hip ,knees    Pneumonia due to organism    Pulmonary embolism (HCC)    Renal disorder    Stroke (HCC)    TIA (transient ischemic attack)    Initial history of left-sided weakness and slurred speech. (+) cocaine. MRI of the brain, CT angiogram of the head and neck, and 2D echo are all unremarkable.     TMJ (dislocation of temporomandibular joint)    Troponin level elevated    Trop 60 60 47 with TIA and no CP: Lexiscan negative with EF 51    Visual impairment              Past Surgical History:   Procedure Laterality Date    Av fistula revision, open Left     Cabg      Colonoscopy N/A 03/26/2023    Procedure: COLONOSCOPY;  Surgeon: Heath Vu MD;  Location:  ENDOSCOPY    Colonoscopy N/A 12/30/2023    Procedure: COLONOSCOPY with cold snare polypectomy and forcep polypectomy;  Surgeon: Ousmane Suarez MD;  Location:  ENDOSCOPY    Colonoscopy & polypectomy  2019    Egd  2019    Duodenitis. Biopsied. EUS for weight loss was negative    Heart surgery      Hernia surgery  08/17/2022    Dr Barnes    Laminectomy,>2 sgmt,lumbar  09/06/2018    L4-L5 Decomp Discectomy ROEM L4-L5    Mitralplasty w cp bypass  1994    [facility]: Repair    Repair rotator cuff,chronic Left     torn and had a ruptured bicep    Sinus surgery        Spine surgery procedure unlisted      Valve repair  1994    mitral valve                Social History     Socioeconomic History    Marital status:    Tobacco Use    Smoking status: Former     Current packs/day: 0.00     Average packs/day: 1 pack/day for 27.0 years (27.0 ttl pk-yrs)     Types: Cigarettes     Start date: 2/28/1995     Quit date: 2/28/2022     Years since quitting: 3.0     Passive exposure: Never    Smokeless tobacco: Never   Vaping Use    Vaping status: Never Used   Substance and Sexual Activity    Alcohol use: No    Drug use: No     Social Drivers of Health     Food Insecurity: No Food Insecurity  (2/2/2025)    Food Insecurity     Food Insecurity: Never true   Transportation Needs: No Transportation Needs (2/2/2025)    Transportation Needs     Lack of Transportation: No   Housing Stability: Low Risk  (2/2/2025)    Housing Stability     Housing Instability: No                  Physical Exam     ED Triage Vitals [03/04/25 1706]   BP (!) 191/116   Pulse 101   Resp 22   Temp 98.2 °F (36.8 °C)   Temp src    SpO2 95 %   O2 Device None (Room air)       Current Vitals:   Vital Signs  BP: (!) 159/113  Pulse: 84  Resp: 20  Temp: 98.2 °F (36.8 °C)    Oxygen Therapy  SpO2: 100 %  O2 Device: None (Room air)        Physical Exam     General: Alert, oriented, no respiratory distress  HEENT:  Pupils equal reactive.  Extraocular motions intact. Dry MM  Neck: Supple  Lungs: Bilateral crackles, right greater than left  Heart: Regular rate and rhythm.  Abdomen: Soft, nontender.   Skin: No rash.  No edema.  Neurologic: No focal neurologic deficits.  Normal speech pattern  Musculoskeletal: No tenderness or deformity noted.  Full range of motion throughout.      ED Course     Labs Reviewed   COMP METABOLIC PANEL (14) - Abnormal; Notable for the following components:       Result Value    Glucose 189 (*)     CO2 18.0 (*)     BUN 67 (*)     Creatinine 10.02 (*)     Calculated Osmolality 310 (*)     eGFR-Cr 6 (*)     All other components within normal limits   CBC WITH DIFFERENTIAL WITH PLATELET - Abnormal; Notable for the following components:    RBC 2.69 (*)     HGB 8.7 (*)     HCT 24.9 (*)     Lymphocyte Absolute 0.80 (*)     All other components within normal limits   RAPID SARS-COV-2 BY PCR - Normal   POCT FLU TEST - Normal    Narrative:     This assay is a rapid molecular in vitro test utilizing nucleic acid amplification of influenza A and B viral RNA.   PROCALCITONIN   LACTIC ACID, PLASMA   RAINBOW DRAW LAVENDER   RAINBOW DRAW LIGHT GREEN   RAINBOW DRAW BLUE   RAINBOW DRAW GOLD   BLOOD CULTURE   BLOOD CULTURE     EKG    Rate,  intervals and axes as noted on EKG Report.  Rate: 97  Rhythm: Sinus Rhythm  Reading: LVH, appears similar to January 18, 2025                       MDM      Differential diagnosis includes pneumonia, flu, COVID, viral gastroenteritis, dehydration, fluid overload    Admission disposition: 3/4/2025  6:58 PM       Chemistry with a slightly low CO2.  Chronic kidney disease.  Normal potassium    CBC shows a chronic anemia    Negative flu and COVID    I have independently visualized the radiology images, my focus/limited interpretation: Right lower lobe infiltrate    Defer to radiologist for other/incidental findings    Medications   sodium chloride 0.9% infusion (125 mL/hr Intravenous New Bag 3/4/25 1737)   HYDROmorphone (Dilaudid) 1 MG/ML injection 0.5 mg (0.5 mg Intravenous Given 3/4/25 1830)   piperacillin-tazobactam (Zosyn) 4.5 g in dextrose 5% 100 mL IVPB-ADDV (4.5 g Intravenous New Bag 3/4/25 1846)   hydrALAzine (Apresoline) 20 mg/mL injection 10 mg (has no administration in time range)   ondansetron (Zofran) 4 MG/2ML injection 4 mg (4 mg Intravenous Given 3/4/25 1734)   labetalol (Trandate) 5 mg/mL injection 20 mg (20 mg Intravenous Given 3/4/25 1734)   labetalol (Trandate) 5 mg/mL injection 20 mg (20 mg Intravenous Given 3/4/25 1830)     SPoke with Dr Norton for admission.  Consult placed to [facility]    Currently the patient does not look like he is any distress regarding fluid overload even though he missed his last dialysis section probably because he has been vomiting and having diarrhea      Medical Decision Making  Amount and/or Complexity of Data Reviewed  External Data Reviewed: notes.     Details: Last discharge summary from 2/2/2025        Disposition and Plan     Clinical Impression:  1. Nosocomial pneumonia    2. ESRD (end stage renal disease) (HCC)    3. Nausea and vomiting in adult         Disposition:  Admit  3/4/2025  6:58 pm    Follow-up:  No follow-up provider specified.        Medications  Prescribed:  Current Discharge Medication List              Supplementary Documentation:         Hospital Problems       Present on Admission  Date Reviewed: 1/18/2025            ICD-10-CM Noted POA    * (Principal) Nosocomial pneumonia J18.9, Y95 3/4/2025 Unknown

## 2025-03-04 NOTE — ED INITIAL ASSESSMENT (HPI)
Pt to ed with c/o nausea, vomiting, cough, runy nose and diarrhea since Sunday, denies fevers. Missed dialysis on Monday

## 2025-03-05 ENCOUNTER — APPOINTMENT (OUTPATIENT)
Dept: CT IMAGING | Facility: HOSPITAL | Age: 45
End: 2025-03-05
Attending: INTERNAL MEDICINE
Payer: MEDICARE

## 2025-03-05 LAB
ALBUMIN SERPL-MCNC: 3.9 G/DL (ref 3.2–4.8)
ANION GAP SERPL CALC-SCNC: 15 MMOL/L (ref 0–18)
ATRIAL RATE: 97 BPM
BASOPHILS # BLD AUTO: 0.09 X10(3) UL (ref 0–0.2)
BASOPHILS NFR BLD AUTO: 1.1 %
BUN BLD-MCNC: 74 MG/DL (ref 9–23)
CALCIUM BLD-MCNC: 8.2 MG/DL (ref 8.7–10.6)
CHLORIDE SERPL-SCNC: 107 MMOL/L (ref 98–112)
CO2 SERPL-SCNC: 19 MMOL/L (ref 21–32)
CREAT BLD-MCNC: 10.4 MG/DL
EGFRCR SERPLBLD CKD-EPI 2021: 6 ML/MIN/1.73M2 (ref 60–?)
EOSINOPHIL # BLD AUTO: 0.37 X10(3) UL (ref 0–0.7)
EOSINOPHIL NFR BLD AUTO: 4.7 %
ERYTHROCYTE [DISTWIDTH] IN BLOOD BY AUTOMATED COUNT: 14.4 %
GLUCOSE BLD-MCNC: 121 MG/DL (ref 70–99)
HCT VFR BLD AUTO: 23.9 %
HGB BLD-MCNC: 8.3 G/DL
HGB BLD-MCNC: 8.5 G/DL
IMM GRANULOCYTES # BLD AUTO: 0.02 X10(3) UL (ref 0–1)
IMM GRANULOCYTES NFR BLD: 0.3 %
LYMPHOCYTES # BLD AUTO: 1.12 X10(3) UL (ref 1–4)
LYMPHOCYTES NFR BLD AUTO: 14.1 %
MAGNESIUM SERPL-MCNC: 2 MG/DL (ref 1.6–2.6)
MCH RBC QN AUTO: 31.7 PG (ref 26–34)
MCHC RBC AUTO-ENTMCNC: 34.7 G/DL (ref 31–37)
MCV RBC AUTO: 91.2 FL
MONOCYTES # BLD AUTO: 0.94 X10(3) UL (ref 0.1–1)
MONOCYTES NFR BLD AUTO: 11.9 %
NEUTROPHILS # BLD AUTO: 5.39 X10 (3) UL (ref 1.5–7.7)
NEUTROPHILS # BLD AUTO: 5.39 X10(3) UL (ref 1.5–7.7)
NEUTROPHILS NFR BLD AUTO: 67.9 %
OSMOLALITY SERPL CALC.SUM OF ELEC: 315 MOSM/KG (ref 275–295)
P AXIS: 33 DEGREES
P-R INTERVAL: 180 MS
PHOSPHATE SERPL-MCNC: 6.9 MG/DL (ref 2.4–5.1)
PLATELET # BLD AUTO: 225 10(3)UL (ref 150–450)
POTASSIUM SERPL-SCNC: 3.7 MMOL/L (ref 3.5–5.1)
Q-T INTERVAL: 418 MS
QRS DURATION: 94 MS
QTC CALCULATION (BEZET): 530 MS
R AXIS: 24 DEGREES
RBC # BLD AUTO: 2.62 X10(6)UL
SODIUM SERPL-SCNC: 141 MMOL/L (ref 136–145)
T AXIS: 105 DEGREES
VENTRICULAR RATE: 97 BPM
WBC # BLD AUTO: 7.9 X10(3) UL (ref 4–11)

## 2025-03-05 PROCEDURE — 85025 COMPLETE CBC W/AUTO DIFF WBC: CPT | Performed by: STUDENT IN AN ORGANIZED HEALTH CARE EDUCATION/TRAINING PROGRAM

## 2025-03-05 PROCEDURE — 74177 CT ABD & PELVIS W/CONTRAST: CPT | Performed by: INTERNAL MEDICINE

## 2025-03-05 PROCEDURE — 85018 HEMOGLOBIN: CPT | Performed by: INTERNAL MEDICINE

## 2025-03-05 PROCEDURE — 70450 CT HEAD/BRAIN W/O DYE: CPT | Performed by: INTERNAL MEDICINE

## 2025-03-05 PROCEDURE — 90935 HEMODIALYSIS ONE EVALUATION: CPT | Performed by: INTERNAL MEDICINE

## 2025-03-05 PROCEDURE — 80069 RENAL FUNCTION PANEL: CPT | Performed by: INTERNAL MEDICINE

## 2025-03-05 PROCEDURE — 36415 COLL VENOUS BLD VENIPUNCTURE: CPT | Performed by: STUDENT IN AN ORGANIZED HEALTH CARE EDUCATION/TRAINING PROGRAM

## 2025-03-05 PROCEDURE — 83735 ASSAY OF MAGNESIUM: CPT | Performed by: INTERNAL MEDICINE

## 2025-03-05 RX ORDER — TRAMADOL HYDROCHLORIDE 50 MG/1
50 TABLET ORAL EVERY 6 HOURS PRN
Status: DISCONTINUED | OUTPATIENT
Start: 2025-03-05 | End: 2025-03-05

## 2025-03-05 RX ORDER — TRAMADOL HYDROCHLORIDE 50 MG/1
50 TABLET ORAL EVERY 12 HOURS PRN
Status: DISCONTINUED | OUTPATIENT
Start: 2025-03-05 | End: 2025-03-07

## 2025-03-05 RX ORDER — CLONIDINE HYDROCHLORIDE 0.1 MG/1
0.1 TABLET ORAL 2 TIMES DAILY
Status: DISCONTINUED | OUTPATIENT
Start: 2025-03-05 | End: 2025-03-07

## 2025-03-05 RX ORDER — SEVELAMER CARBONATE 800 MG/1
2400 TABLET, FILM COATED ORAL
Status: DISCONTINUED | OUTPATIENT
Start: 2025-03-05 | End: 2025-03-07

## 2025-03-05 RX ORDER — ONDANSETRON 2 MG/ML
4 INJECTION INTRAMUSCULAR; INTRAVENOUS EVERY 6 HOURS PRN
Status: DISCONTINUED | OUTPATIENT
Start: 2025-03-05 | End: 2025-03-07

## 2025-03-05 NOTE — CONSULTS
[facility]   part of [facility]    Report of Consultation    Godwin Fonseca Patient Status:  Inpatient    1980 MRN [id number]   Location [facility] [place] 4NW-A Attending Regina Salmon MD   Hosp Day # 1 PCP Adrian Small MD       REASON FOR CONSULT:     ESRD    HISTORY OF PRESENT ILLNESS:     43 yo M with history of ESRD on iHD MWF via LUE AVF, HTN, severe MR s/p MVR x 2, HFpEF, DVT/PE, TIA, MGUS, bipolar disorder, recurrent GI bleed, diffuse body pain presented with n/v/poor oral intake x few days. He missed his usual dialysis treatment on Monday. He tolerated HD well on Friday. He still makes urine. His fistula has been functioning well. He has no leg swelling or shortness of breath.    REVIEW OF SYSTEMS:     Please see HPI for pertinent positives. 10 point review of systems otherwise reviewed and negative.     HISTORY:     Past Medical History:    Anxiety state    Arrhythmia    Asthma ([facility])    Attention deficit hyperactivity disorder (ADHD)    Back problem    Bipolar 1 disorder ([facility])    CKD (chronic kidney disease) stage 3, GFR 30-59 ml/min ([facility])    Dr Meeks    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[facility])    Congestive heart disease ([facility])    COPD (chronic obstructive pulmonary disease) ([facility])    Coronary atherosclerosis    Deep vein thrombosis ([facility])    at age 17 R/T cast    Depression    Diabetes ([facility])    Dialysis patient    Diverticulosis of large intestine    Essential hypertension    3/21 echo: severe concentric LVH with normal EF and no MR or pHTN    Extrinsic asthma, unspecified    Heart attack (HCC)    - angiogram- no intervention    Heart valve disease    mitral valve repair in /    High blood pressure    High cholesterol    History of blood transfusion    History of mitral valve repair    Hyperlipidemia    Low back pain    tight and stiff after sweeping and mopping    LVH (left ventricular hypertrophy)    Migraines    Mixed hyperlipidemia     HDL 38 LDL 97 VLDL 57     Monoclonal  gammopathy    IgG kappa     Muscle weakness    MVP (mitral valve prolapse)    Repair 1994 at [place]; echoes as recently as 3/21 show mild or trivial MR and no stenosis    Neuropathy    Osteoarthritis    hip ,knees    Pneumonia due to organism    Pulmonary embolism (HCC)    Renal disorder    Stroke (HCC)    TIA (transient ischemic attack)    Initial history of left-sided weakness and slurred speech. (+) cocaine. MRI of the brain, CT angiogram of the head and neck, and 2D echo are all unremarkable.     TMJ (dislocation of temporomandibular joint)    Troponin level elevated    Trop 60 60 47 with TIA and no CP: Lexiscan negative with EF 51    Visual impairment     Past Surgical History:   Procedure Laterality Date    Av fistula revision, open Left     Cabg      Colonoscopy N/A 03/26/2023    Procedure: COLONOSCOPY;  Surgeon: Heath Vu MD;  Location:  ENDOSCOPY    Colonoscopy N/A 12/30/2023    Procedure: COLONOSCOPY with cold snare polypectomy and forcep polypectomy;  Surgeon: Ousmane Suarez MD;  Location:  ENDOSCOPY    Colonoscopy & polypectomy  2019    Egd  2019    Duodenitis. Biopsied. EUS for weight loss was negative    Heart surgery      Hernia surgery  08/17/2022    Dr Barnes    Laminectomy,>2 sgmt,lumbar  09/06/2018    L4-L5 Decomp Discectomy ROEM L4-L5    Mitralplasty w cp bypass  1994    [place]: Repair    Repair rotator cuff,chronic Left     torn and had a ruptured bicep    Sinus surgery        Spine surgery procedure unlisted      Valve repair  1994    mitral valve     Family History   Problem Relation Age of Onset    Hypertension Father     Alcohol and Other Disorders Associated Father     Substance Abuse Father         cocaine    Dementia Father     Cancer Father         lung    Diabetes Mother     Cancer Mother         multiple myeloma    Hypertension Mother     Anxiety Maternal Aunt     Depression Maternal Aunt     Anxiety Maternal Aunt     Depression Maternal Aunt     Bipolar Disorder Maternal  Aunt     Diabetes Maternal Grandmother     Hypertension Maternal Grandmother     Cancer Maternal Grandfather         stomach cancer    Diabetes Maternal Grandfather     Hypertension Maternal Grandfather     Alcohol and Other Disorders Associated Maternal Grandfather     Hypertension Paternal Grandmother     Hypertension Paternal Grandfather     Cancer Sister         uterine and ovarian    Hypertension Sister     Cancer Maternal Uncle         lung    Cancer Paternal Aunt         throat      reports that he quit smoking about 3 years ago. His smoking use included cigarettes. He started smoking about 30 years ago. He has a 27 pack-year smoking history. He has never been exposed to tobacco smoke. He has never used smokeless tobacco. He reports that he does not drink alcohol and does not use drugs.    ALLERGIES:     Allergies[1]    MEDICATIONS:       Current Facility-Administered Medications:     cefTRIAXone (Rocephin) 2 g in sodium chloride 0.9% 100 mL IVPB-ADDV, 2 g, Intravenous, Q24H    traMADol (Ultram) tab 50 mg, 50 mg, Oral, Q12H PRN    sevelamer carbonate (Renvela) tab 2,400 mg, 2,400 mg, Oral, TID CC    ondansetron (Zofran) 4 MG/2ML injection 4 mg, 4 mg, Intravenous, Q6H PRN    pantoprazole (Protonix) 40 mg in sodium chloride 0.9% PF 10 mL IV push, 40 mg, Intravenous, Q24H    heparin (Porcine) 5000 UNIT/ML injection 5,000 Units, 5,000 Units, Subcutaneous, Q8H MARTHA    acetaminophen (Tylenol Extra Strength) tab 500 mg, 500 mg, Oral, Q4H PRN    melatonin tab 3 mg, 3 mg, Oral, Nightly PRN    prochlorperazine (Compazine) 10 MG/2ML injection 5 mg, 5 mg, Intravenous, Q8H PRN    polyethylene glycol (PEG 3350) (Miralax) 17 g oral packet 17 g, 17 g, Oral, Daily PRN    sennosides (Senokot) tab 17.2 mg, 17.2 mg, Oral, Nightly PRN    bisacodyl (Dulcolax) 10 MG rectal suppository 10 mg, 10 mg, Rectal, Daily PRN    hydrALAzine (Apresoline) 20 mg/mL injection 10 mg, 10 mg, Intravenous, Q6H PRN    albuterol (Ventolin HFA) 108 (90  Base) MCG/ACT inhaler 2 puff, 2 puff, Inhalation, Q6H PRN    ARIPiprazole (Abilify) tab 15 mg, 15 mg, Oral, Daily    buPROPion ER (Wellbutrin XL) 24 hr tab 150 mg, 150 mg, Oral, Daily    calcium acetate (Phoslo) cap 667 mg, 667 mg, Oral, TID with meals    carvedilol (Coreg) tab 25 mg, 25 mg, Oral, BID with meals    [START ON 3/10/2025] cholecalciferol (Vitamin D3) tab 2,000 Units, 2,000 Units, Oral, Weekly    cloNIDine (Catapres) tab 0.1 mg, 0.1 mg, Oral, Nightly    FLUoxetine (PROzac) cap 40 mg, 40 mg, Oral, Daily    gabapentin (Neurontin) cap 200 mg, 200 mg, Oral, TID    hydrALAZINE (Apresoline) tab 25 mg, 25 mg, Oral, TID    lamoTRIgine (LaMICtal) tab 100 mg, 100 mg, Oral, Daily    NIFEdipine ER (Procardia-XL) 24 hr tab 30 mg, 30 mg, Oral, Daily    tamsulosin (Flomax) cap 0.4 mg, 0.4 mg, Oral, Daily    [Held by provider] lisdexamfetamine (Vyvanse) cap 60 mg, 60 mg, Oral, QAM    sodium chloride 0.9 % IV bolus 100 mL, 100 mL, Intravenous, Q30 Min PRN **AND** albumin human (Albumin) 25% injection 25 g, 25 g, Intravenous, PRN Dialysis    doxycycline hyclate (Vibramycin) 100 mg in sodium chloride 0.9% 100 mL IVPB, 100 mg, Intravenous, Q12H  No current outpatient medications on file.         PHYSICAL EXAM:     Vital Signs: /80 (BP Location: Right arm)   Pulse 84   Temp 97.8 °F (36.6 °C) (Oral)   Resp 18   Ht 188 cm (6' 2\")   Wt 225 lb (102.1 kg)   SpO2 98%   BMI 28.89 kg/m²   Temp (24hrs), Av.9 °F (36.6 °C), Min:97.7 °F (36.5 °C), Max:98.2 °F (36.8 °C)       Intake/Output Summary (Last 24 hours) at 3/5/2025 1253  Last data filed at 3/5/2025 1115  Gross per 24 hour   Intake 340 ml   Output 1400 ml   Net -1060 ml     Wt Readings from Last 3 Encounters:   25 225 lb (102.1 kg)   25 246 lb (111.6 kg)   25 246 lb 14.6 oz (112 kg)       General: nad  Skin: no visible rashes  HEENT: NCAT  Cardiac: Regular rate and rhythm, no murmur/gallop or rub  Lungs: CTAB, no wheeze, no rale, no  rhonchi  Abdomen: Soft, NTND  Extremities: warm, well perfused, no leg edema  Neurologic/Psych: mentating well  LUE AVF functioning well on HD    LABORATORY DATA:       Lab Results   Component Value Date     (H) 03/05/2025    BUN 74 (H) 03/05/2025    BUNCREA 13.0 03/07/2022    CREATSERUM 10.40 (H) 03/05/2025    ANIONGAP 15 03/05/2025    GFR 59 (L) 01/04/2018    GFRNAA 30 (L) 07/12/2022    GFRAA 35 (L) 07/12/2022    CA 8.2 (L) 03/05/2025    OSMOCALC 315 (H) 03/05/2025    ALKPHO 97 03/04/2025    AST 33 03/04/2025    ALT 35 03/04/2025    BILT 0.4 03/04/2025    TP 7.0 03/04/2025    ALB 3.9 03/05/2025    GLOBULIN 3.1 03/04/2025     03/05/2025    K 3.7 03/05/2025     03/05/2025    CO2 19.0 (L) 03/05/2025     Lab Results   Component Value Date    WBC 7.9 03/05/2025    RBC 2.62 (L) 03/05/2025    HGB 8.5 (L) 03/05/2025    HCT 23.9 (L) 03/05/2025    .0 03/05/2025    MPV 11.5 12/14/2012    MCV 91.2 03/05/2025    MCH 31.7 03/05/2025    MCHC 34.7 03/05/2025    RDW 14.4 03/05/2025    NEPRELIM 5.39 03/05/2025    NEPERCENT 67.9 03/05/2025    LYPERCENT 14.1 03/05/2025    MOPERCENT 11.9 03/05/2025    EOPERCENT 4.7 03/05/2025    BAPERCENT 1.1 03/05/2025    NE 5.39 03/05/2025    LYMABS 1.12 03/05/2025    MOABSO 0.94 03/05/2025    EOABSO 0.37 03/05/2025    BAABSO 0.09 03/05/2025     Lab Results   Component Value Date    MALBP 167.00 05/24/2023    CREUR 142.00 05/24/2023    CREUR 142.00 05/24/2023     Lab Results   Component Value Date    COLORUR Light-Yellow 11/17/2024    CLARITY Clear 11/17/2024    SPECGRAVITY 1.013 11/17/2024    GLUUR Normal 11/17/2024    BILUR Negative 11/17/2024    KETUR Negative 11/17/2024    BLOODURINE Negative 11/17/2024    PHURINE 8.5 (H) 11/17/2024    PROUR 100 (A) 11/17/2024    UROBILINOGEN Normal 11/17/2024    NITRITE Negative 11/17/2024    LEUUR Negative 11/17/2024    WBCUR 1-5 11/17/2024    RBCUR 0-2 11/17/2024    EPIUR Few (A) 11/17/2024    BACUR Rare (A) 11/17/2024    CAOXUR  Occasional (A) 04/20/2018    HYLUR Present (A) 05/14/2019         IMAGING:     Reviewed.      ASSESSMENT/PLAN:     45 yo M with history of ESRD on iHD MWF via LUE AVF, HTN, severe MR s/p MVR x 2, HFpEF, DVT/PE, TIA, MGUS, bipolar disorder, recurrent GI bleed, diffuse body pain presented with n/v/poor oral intake x few days.    ESRD:  -- HD MWF per schedule     Anemia:  -- epo 5000 u     MBD:  -- increased sevelamer to 2400 TID AC; resume  -- ca acetate 667 tid ac  -- low phos diet     HTN:  -- pt takes coreg and losartan pre-HD  -- takes remaining hypertensives pre-HD if systolic > 180 but should take clonidine consistently to avoid rebound     LUE AVF:  -- L arm precautions     N/v:  -- GI consulted    Will follow.        Thank you for allowing me to participate in the care of your patient. Please do not hesitate to contact me with concerns or questions.    Lenka Bond MD  [facility]         [1]   Allergies  Allergen Reactions    Hydrochlorothiazide RASH and HIVES    Fentanyl ITCHING and NAUSEA ONLY

## 2025-03-05 NOTE — PLAN OF CARE
Dialysis concluded Vital signs stable,1.4L fluid off per dialysis nurse report. While having dialysis BP was on the 160s w/ diastolic on the low 110s.BP meds were administered per MD instructions. BP dropped w/ SBP on the 90s while on dialysis.. Dialysis nurse gave bolus NS after which BP went up w/ SBP on the 120s.All the rest of VS stable.  Patient was given tramadol earlier today for c/o pain on the back of the eye & neck pain,noted w/ relief. Kept NPO for CT abdomen,pelvis. GI consulted patient,further management will follow after CT abdomen per GI.No c/o nausea,no vomiting & not passing stools.Patient asleep at this time. All morning meds administered. Attended to all needs.

## 2025-03-05 NOTE — H&P
[facility] Hospitalist History and Physical     PCP: Adrian Small MD      Chief Complaint: weakness     History of Present Illness: Patient is a 44 year old male PMHx of ESRD MWF, HTN, Severe MR s/p MVR, HFpEF, Hx of Prior DVT/PE, TIA, Bipolar Disorder, Recurrent prior GI bleeds who presents to the hospital after missing dialysis 3/3 and also with nausea/vomiting and generalized weakness.  Last got dialysis on Friday 2/28.      States on 3/2 he vomited bile, and also had some dark / black stools.  No fevers, + abdominal discomfort. No chest pin no SOB. Missed dialysis on 3/3 due to his illness also hasn't taken meds due to his illness     In the ED, t afebrile, HR 100s, BP 190s systolics, RA satting 95.  Chest xray with possible RLL pna.  Pt started on IV abx.      This AM has a headache, and chronic neck pain.  Feels nauseous.     Current Meds  Scheduled Meds:    cefTRIAXone  2 g Intravenous Q24H    heparin  5,000 Units Subcutaneous Q8H MARTHA    ARIPiprazole  15 mg Oral Daily    buPROPion ER  150 mg Oral Daily    calcium acetate  667 mg Oral TID with meals    carvedilol  25 mg Oral BID with meals    [START ON 3/10/2025] cholecalciferol  2,000 Units Oral Weekly    cloNIDine  0.1 mg Oral Nightly    FLUoxetine HCl  40 mg Oral Daily    gabapentin  200 mg Oral TID    hydrALAZINE  25 mg Oral TID    lamoTRIgine  100 mg Oral Daily    NIFEdipine ER  30 mg Oral Daily    tamsulosin  0.4 mg Oral Daily    Lisdexamfetamine Dimesylate  60 mg Oral QAM    doxycycline  100 mg Intravenous Q12H     Continuous Infusions:   PRN Meds:   acetaminophen    melatonin    prochlorperazine    polyethylene glycol (PEG 3350)    sennosides    bisacodyl    hydrALAzine    albuterol    sodium chloride **AND** albumin human    Allergies[1]     Past Medical History:    Anxiety state    Arrhythmia    Asthma (HCC)    Attention deficit hyperactivity disorder (ADHD)    Back problem    Bipolar 1 disorder (HCC)    CKD (chronic kidney disease) stage 3, GFR 30-59  ml/min (HCC)    Dr Meeks    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HCC)    Congestive heart disease (HCC)    COPD (chronic obstructive pulmonary disease) (HCC)    Coronary atherosclerosis    Deep vein thrombosis (HCC)    at age 17 R/T cast    Depression    Diabetes (HCC)    Dialysis patient    Diverticulosis of large intestine    Essential hypertension    3/21 echo: severe concentric LVH with normal EF and no MR or pHTN    Extrinsic asthma, unspecified    Heart attack (HCC)    2016- angiogram- no intervention    Heart valve disease    mitral valve repair in 1994/    High blood pressure    High cholesterol    History of blood transfusion    History of mitral valve repair    Hyperlipidemia    Low back pain    tight and stiff after sweeping and mopping    LVH (left ventricular hypertrophy)    Migraines    Mixed hyperlipidemia     HDL 38 LDL 97 VLDL 57     Monoclonal gammopathy    IgG kappa     Muscle weakness    MVP (mitral valve prolapse)    Repair 1994 at [place]; echoes as recently as 3/21 show mild or trivial MR and no stenosis    Neuropathy    Osteoarthritis    hip ,knees    Pneumonia due to organism    Pulmonary embolism (HCC)    Renal disorder    Stroke (HCC)    TIA (transient ischemic attack)    Initial history of left-sided weakness and slurred speech. (+) cocaine. MRI of the brain, CT angiogram of the head and neck, and 2D echo are all unremarkable.     TMJ (dislocation of temporomandibular joint)    Troponin level elevated    Trop 60 60 47 with TIA and no CP: Lexiscan negative with EF 51    Visual impairment      Past Surgical History:   Procedure Laterality Date    Av fistula revision, open Left     Cabg      Colonoscopy N/A 03/26/2023    Procedure: COLONOSCOPY;  Surgeon: Heath Vu MD;  Location:  ENDOSCOPY    Colonoscopy N/A 12/30/2023    Procedure: COLONOSCOPY with cold snare polypectomy and forcep polypectomy;  Surgeon: Ousmane Suarez MD;  Location:  ENDOSCOPY    Colonoscopy & polypectomy   2019    Egd  2019    Duodenitis. Biopsied. EUS for weight loss was negative    Heart surgery      Hernia surgery  08/17/2022    Dr Barnes    Laminectomy,>2 sgmt,lumbar  09/06/2018    L4-L5 Decomp Discectomy ROEM L4-L5    Mitralplasty w cp bypass  1994    [place]: Repair    Repair rotator cuff,chronic Left     torn and had a ruptured bicep    Sinus surgery        Spine surgery procedure unlisted      Valve repair  1994    mitral valve      Social History     Tobacco Use    Smoking status: Former     Current packs/day: 0.00     Average packs/day: 1 pack/day for 27.0 years (27.0 ttl pk-yrs)     Types: Cigarettes     Start date: 2/28/1995     Quit date: 2/28/2022     Years since quitting: 3.0     Passive exposure: Never    Smokeless tobacco: Never   Substance Use Topics    Alcohol use: No      Family History   Problem Relation Age of Onset    Hypertension Father     Alcohol and Other Disorders Associated Father     Substance Abuse Father         cocaine    Dementia Father     Cancer Father         lung    Diabetes Mother     Cancer Mother         multiple myeloma    Hypertension Mother     Anxiety Maternal Aunt     Depression Maternal Aunt     Anxiety Maternal Aunt     Depression Maternal Aunt     Bipolar Disorder Maternal Aunt     Diabetes Maternal Grandmother     Hypertension Maternal Grandmother     Cancer Maternal Grandfather         stomach cancer    Diabetes Maternal Grandfather     Hypertension Maternal Grandfather     Alcohol and Other Disorders Associated Maternal Grandfather     Hypertension Paternal Grandmother     Hypertension Paternal Grandfather     Cancer Sister         uterine and ovarian    Hypertension Sister     Cancer Maternal Uncle         lung    Cancer Paternal Aunt         throat            Intake/Output:  I/O last 3 completed shifts:  In: 340 [P.O.:240; IV PIGGYBACK:100]  Out: -   Wt Readings from Last 3 Encounters:   03/04/25 225 lb (102.1 kg)   02/02/25 246 lb (111.6 kg)   01/22/25 246 lb 14.6  oz (112 kg)         Exam:  [unfilled]  Temp:  [97.7 °F (36.5 °C)-98.2 °F (36.8 °C)] 97.8 °F (36.6 °C)  Pulse:  [] 93  Resp:  [20-22] 20  BP: (159-199)/() 172/93  SpO2:  [92 %-100 %] 93 %  General:  Alert, no distress, appears stated age.   Head:  Normocephalic, without obvious abnormality, atraumatic.    Eyes:  Sclera anicteric, No conjunctival pallor, EOMs intact.    Throat: MMM     Neck: Supple, symmetrical, trachea midline    Lungs:   Clear to auscultation bilaterally. Normal effort    Chest wall:  No tenderness or deformity.   Heart:  Regular rate and rhythm, no peripheral edema    Abdomen:   Soft, NT/ND, +bs    MSK: Atraumatic, no cyanosis or edema.    Skin: No visible rashes or lesions.     Neurologic: Normal strength, no focal deficit appreciated            Labs:       Lab Results   Component Value Date    WBC 7.9 03/05/2025    HGB 8.3 03/05/2025    HCT 23.9 03/05/2025    .0 03/05/2025    CREATSERUM 10.02 03/04/2025    BUN 67 03/04/2025     03/04/2025    K 3.6 03/04/2025     03/04/2025    CO2 18.0 03/04/2025     03/04/2025    CA 8.7 03/04/2025    ALB 3.9 03/04/2025    ALKPHO 97 03/04/2025    BILT 0.4 03/04/2025    TP 7.0 03/04/2025    AST 33 03/04/2025    ALT 35 03/04/2025               Assessment/Plan:  Patient is a 44 year old male PMHx of ESRD MWF, HTN, Severe MR s/p MVR, HFpEF, Hx of Prior DVT/PE, TIA, Bipolar Disorder, Recurrent prior GI bleeds who presents to the hospital after missing dialysis 3/3 and also with nausea/vomiting and generalized weakness.  Last got dialysis on Friday 2/28.             Plan:    N/V, generalized weakness, abd discomfort   - check CT abd pelvis - has noted some black stools, IV ppi, check h/h    - has hx of GI bleed in the past had work in the past notable for diverticulosis and hemorrhoids  - GI consult     HTNsive urgency   - home meds restart   - BP not controlled, per pt did not take his meds  - check CTOH given headache and  uncontrolled BP     ESRD on MWF  - per renal   - pt missed HD on 3/3 due to above     Abnl chest xray   - possible pna  - abx for now    HFpEF  - compensated    Bipolar disorder  - home meds    Severe MR s/p MVR  - stable       Prophylaxis:  DVT: heparin subcutaneous     Full        Regina Salmon MD             [1]   Allergies  Allergen Reactions    Hydrochlorothiazide RASH and HIVES    Fentanyl ITCHING and NAUSEA ONLY

## 2025-03-05 NOTE — CONSULTS
[facility]  Report of Consultation    Godwin Fonseca Patient Status:  Inpatient    1980 MRN [id number]   Location [facility] 4NW-A Attending Regina Salmon MD   Hosp Day # 1 PCP Adrian Small MD     Reason for Consultation:  N/v/d    Godwin Fonseca is a a(n) 44 year old male.     Chronic anemia/abd pain. Seen by gi service mult times in the past    Capsule w/ Dr Alfaro 10/2024 --no sb bleeding.  Flex sig 3/2024 w Danny, punctate blood on large hemorrhoids, nl sigmoid.   2023 cscope w/ Dr santana w/ polyps and diverticulosis    Seen by Dr Salinas 25 for chronic aneia, bleeding, STEMI, coronovirus and he rec'ed serial hg but no further endoscopy    Pt denies asa/nsaids/ppi    States recent cipro for prostatitis.  Had n/v/d last 2-3 days, missed hd bc of that .   Some dark stool    States now has hunger and the n/v/d have improved.  Still some dyspepsia    No stool studies yet and imaging is pending.  Per RN/patient, no bms since being admitted    Denies current chest pain, sob to my interview or f/c/s.  States mostly improved since coronovirus infection    Hg admit was 8.3, bun/cr 74/10.4    Role and risk of endoscopy including morbidity/mortality reviewed w/ him, he states he is familiar given the mult endoscopies he has had in the past but did not currently want endoscopic eval    Denies heartburn or dysphagia         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 (HCC)    Chronic non-seasonal allergic rhinitis    Chronic sinusitis, unspecified location    ADHD (attention deficit hyperactivity disorder), inattentive type    Bipolar depression (HCC)    Essential hypertension    Mild persistent asthma without complication (HCC)    CKD (chronic kidney disease) stage 3, GFR 30-59 ml/min (HCC)    Self-inflicted injury    Bipolar disorder in partial remission, most recent episode unspecified type (HCC)    Family history of prostate cancer     Fatigue, unspecified type    Alkaline phosphatase elevation    Hyperlipidemia, mixed    Low serum HDL    Spinal stenosis of lumbar region with neurogenic claudication    Prolapsed lumbar disc    Status post lumbar surgery    Cauda equina syndrome (HCC)    Lumbar disc prolapse with compression radiculopathy    Syncope and collapse    Hypokalemia    Orthostatic hypotension    Hypertension, unspecified type    Weight loss    Chest pain, unspecified type    History of mitral valve stenosis    Acute pericarditis, unspecified type (HCC)    Cervical radiculopathy    Elevated blood protein    IgG monoclonal protein disorder    MGUS (monoclonal gammopathy of unknown significance)    Hypertensive urgency    Bipolar 2 disorder (HCC)    Employment problem    Stress    Anxiety disorder, unspecified type    Weakness of left lower extremity    Rectal bleeding    Paresthesia of foot, bilateral    Obesity (BMI 30-39.9)    TIA (transient ischemic attack)    TMJ dysfunction    Inverted papilloma of nasal cavity    Type 2 diabetes mellitus with stage 3b chronic kidney disease, without long-term current use of insulin (HCC)    Acute kidney injury    Metabolic acidosis    Hyperglycemia    Chronic kidney disease, unspecified CKD stage    Abdominal distension    SOB (shortness of breath)    Hypertensive crisis    Acute on chronic congestive heart failure, unspecified heart failure type (HCC)    Acute congestive heart failure (HCC)    Acute congestive heart failure, unspecified heart failure type (HCC)    Acute on chronic diastolic congestive heart failure (HCC)    Stage 4 chronic kidney disease (HCC)    ROBERT (acute kidney injury)    Abdominal pain, left lower quadrant    Hypertensive emergency    Acute chest pain    Acute on chronic renal insufficiency    Chronic abdominal pain    Nonintractable headache, unspecified chronicity pattern, unspecified headache type    Chronic right shoulder pain    HCAP (healthcare-associated pneumonia)     Acute intractable headache, unspecified headache type    ESRD (end stage renal disease) on dialysis ([facility])    Diarrhea, unspecified type    Primary hypertension    Dyspnea, unspecified type    Abdominal pain, acute    ESRD on dialysis ([facility])    Fluid overload    ESRD needing dialysis ([facility])    COVID-19 virus infection    Hypotension, unspecified hypotension type    Anemia    Acute renal failure (ARF)    Neck pain    Acute nonintractable headache, unspecified headache type    GI bleed    Chronic kidney disease with end stage renal failure on dialysis ([facility])    Lower abdominal pain    ESRD (end stage renal disease) ([facility])    Resistant hypertension    Community acquired pneumonia of right lower lobe of lung    Acute renal failure with acute renal cortical necrosis superimposed on stage 4 chronic kidney disease ([facility])    Acute renal failure, unspecified acute renal failure type    Hypervolemia, unspecified hypervolemia type    End stage renal disease on dialysis ([facility])    Acute respiratory failure with hypoxia ([facility])    Stage 5 chronic kidney disease on chronic dialysis ([facility])    Anemia, unspecified type    Hyperkalemia    Hemodialysis catheter infection, initial encounter    Type 2 diabetes mellitus with chronic kidney disease on chronic dialysis, without long-term current use of insulin ([facility])    Coronary artery disease involving native coronary artery of native heart without angina pectoris    Pneumonia of right lower lobe due to infectious organism    Expressive aphasia    Uncontrolled hypertension    Bipolar disorder with moderate depression ([facility])    BRBPR (bright red blood per rectum)    Weakness    Nosebleed    ESRD on hemodialysis (HCC)    Acute colitis    Chest pain of uncertain etiology    Medically noncompliant    Dialysis patient, noncompliant    History of lower GI bleeding    Fever, unspecified fever cause    Nosocomial pneumonia    Nausea and vomiting in adult         History:  Past Medical History:    Anxiety  state    Arrhythmia    Asthma ([facility])    Attention deficit hyperactivity disorder (ADHD)    Back problem    Bipolar 1 disorder ([facility])    CKD (chronic kidney disease) stage 3, GFR 30-59 ml/min ([facility])    Dr Meeks    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[facility])    Congestive heart disease ([facility])    COPD (chronic obstructive pulmonary disease) ([facility])    Coronary atherosclerosis    Deep vein thrombosis ([facility])    at age 17 R/T cast    Depression    Diabetes ([facility])    Dialysis patient    Diverticulosis of large intestine    Essential hypertension    3/21 echo: severe concentric LVH with normal EF and no MR or pHTN    Extrinsic asthma, unspecified    Heart attack ([facility])    2016- angiogram- no intervention    Heart valve disease    mitral valve repair in 1994/    High blood pressure    High cholesterol    History of blood transfusion    History of mitral valve repair    Hyperlipidemia    Low back pain    tight and stiff after sweeping and mopping    LVH (left ventricular hypertrophy)    Migraines    Mixed hyperlipidemia     HDL 38 LDL 97 VLDL 57     Monoclonal gammopathy    IgG kappa     Muscle weakness    MVP (mitral valve prolapse)    Repair 1994 at [place]; echoes as recently as 3/21 show mild or trivial MR and no stenosis    Neuropathy    Osteoarthritis    hip ,knees    Pneumonia due to organism    Pulmonary embolism ([facility])    Renal disorder    Stroke ([facility])    TIA (transient ischemic attack)    Initial history of left-sided weakness and slurred speech. (+) cocaine. MRI of the brain, CT angiogram of the head and neck, and 2D echo are all unremarkable.     TMJ (dislocation of temporomandibular joint)    Troponin level elevated    Trop 60 60 47 with TIA and no CP: Lexiscan negative with EF 51    Visual impairment       Past Surgical History:   Procedure Laterality Date    Av fistula revision, open Left     Cabg      Colonoscopy N/A 03/26/2023    Procedure: COLONOSCOPY;  Surgeon: Heath Vu MD;  Location:  ENDOSCOPY     Colonoscopy N/A 12/30/2023    Procedure: COLONOSCOPY with cold snare polypectomy and forcep polypectomy;  Surgeon: Ousmane Suarez MD;  Location:  ENDOSCOPY    Colonoscopy & polypectomy  2019    Egd  2019    Duodenitis. Biopsied. EUS for weight loss was negative    Heart surgery      Hernia surgery  08/17/2022    Dr Barnes    Laminectomy,>2 sgmt,lumbar  09/06/2018    L4-L5 Decomp Discectomy ROEM L4-L5    Mitralplasty w cp bypass  1994    [place]: Repair    Repair rotator cuff,chronic Left     torn and had a ruptured bicep    Sinus surgery        Spine surgery procedure unlisted      Valve repair  1994    mitral valve       Family History   Problem Relation Age of Onset    Hypertension Father     Alcohol and Other Disorders Associated Father     Substance Abuse Father         cocaine    Dementia Father     Cancer Father         lung    Diabetes Mother     Cancer Mother         multiple myeloma    Hypertension Mother     Anxiety Maternal Aunt     Depression Maternal Aunt     Anxiety Maternal Aunt     Depression Maternal Aunt     Bipolar Disorder Maternal Aunt     Diabetes Maternal Grandmother     Hypertension Maternal Grandmother     Cancer Maternal Grandfather         stomach cancer    Diabetes Maternal Grandfather     Hypertension Maternal Grandfather     Alcohol and Other Disorders Associated Maternal Grandfather     Hypertension Paternal Grandmother     Hypertension Paternal Grandfather     Cancer Sister         uterine and ovarian    Hypertension Sister     Cancer Maternal Uncle         lung    Cancer Paternal Aunt         throat        reports that he quit smoking about 3 years ago. His smoking use included cigarettes. He started smoking about 30 years ago. He has a 27 pack-year smoking history. He has never been exposed to tobacco smoke. He has never used smokeless tobacco. He reports that he does not drink alcohol and does not use drugs.    Allergies:  Allergies[1]    Medications:  Current  Facility-Administered Medications   Medication Dose Route Frequency    cefTRIAXone (Rocephin) 2 g in sodium chloride 0.9% 100 mL IVPB-ADDV  2 g Intravenous Q24H    traMADol (Ultram) tab 50 mg  50 mg Oral Q12H PRN    sevelamer carbonate (Renvela) tab 2,400 mg  2,400 mg Oral TID CC    ondansetron (Zofran) 4 MG/2ML injection 4 mg  4 mg Intravenous Q6H PRN    pantoprazole (Protonix) 40 mg in sodium chloride 0.9% PF 10 mL IV push  40 mg Intravenous Q24H    heparin (Porcine) 5000 UNIT/ML injection 5,000 Units  5,000 Units Subcutaneous Q8H MARTHA    acetaminophen (Tylenol Extra Strength) tab 500 mg  500 mg Oral Q4H PRN    melatonin tab 3 mg  3 mg Oral Nightly PRN    prochlorperazine (Compazine) 10 MG/2ML injection 5 mg  5 mg Intravenous Q8H PRN    polyethylene glycol (PEG 3350) (Miralax) 17 g oral packet 17 g  17 g Oral Daily PRN    sennosides (Senokot) tab 17.2 mg  17.2 mg Oral Nightly PRN    bisacodyl (Dulcolax) 10 MG rectal suppository 10 mg  10 mg Rectal Daily PRN    hydrALAzine (Apresoline) 20 mg/mL injection 10 mg  10 mg Intravenous Q6H PRN    albuterol (Ventolin HFA) 108 (90 Base) MCG/ACT inhaler 2 puff  2 puff Inhalation Q6H PRN    ARIPiprazole (Abilify) tab 15 mg  15 mg Oral Daily    buPROPion ER (Wellbutrin XL) 24 hr tab 150 mg  150 mg Oral Daily    calcium acetate (Phoslo) cap 667 mg  667 mg Oral TID with meals    carvedilol (Coreg) tab 25 mg  25 mg Oral BID with meals    [START ON 3/10/2025] cholecalciferol (Vitamin D3) tab 2,000 Units  2,000 Units Oral Weekly    cloNIDine (Catapres) tab 0.1 mg  0.1 mg Oral Nightly    FLUoxetine (PROzac) cap 40 mg  40 mg Oral Daily    gabapentin (Neurontin) cap 200 mg  200 mg Oral TID    hydrALAZINE (Apresoline) tab 25 mg  25 mg Oral TID    lamoTRIgine (LaMICtal) tab 100 mg  100 mg Oral Daily    NIFEdipine ER (Procardia-XL) 24 hr tab 30 mg  30 mg Oral Daily    tamsulosin (Flomax) cap 0.4 mg  0.4 mg Oral Daily    [Held by provider] lisdexamfetamine (Vyvanse) cap 60 mg  60 mg Oral  QAM    sodium chloride 0.9 % IV bolus 100 mL  100 mL Intravenous Q30 Min PRN    And    albumin human (Albumin) 25% injection 25 g  25 g Intravenous PRN Dialysis    doxycycline hyclate (Vibramycin) 100 mg in sodium chloride 0.9% 100 mL IVPB  100 mg Intravenous Q12H       Prior to Admission Medications   Prescriptions Last Dose Informant Patient Reported? Taking?   ARIPiprazole 10 MG Oral Tab   Yes Yes   Sig: Take 1 tablet (10 mg total) by mouth daily. Take 10mg (1 tablet) by mouth daily. Take each dose with 1 tablet of 5mg aripiprazole for a total daily dose of 15mg aripiprazole.   ARIPiprazole 5 MG Oral Tab   Yes Yes   Sig: Take 1 tablet (5 mg total) by mouth daily. Take 5mg (1 tablet) by mouth daily. Take each dose with 1 tablet of 10mg aripiprazole for a total daily dose of 15mg aripiprazole.   FLUoxetine HCl 40 MG Oral Cap 3/4/2025 Morning  No Yes   Sig: Take 1 capsule (40 mg total) by mouth daily.   Fluticasone Propionate 50 MCG/ACT Nasal Suspension 3/4/2025 Morning  No Yes   Sig: SPRAY ONCE INTO EACH NOSTRIL BID PRN   HYDROcodone-acetaminophen  MG Oral Tab 3/4/2025 Morning  Yes Yes   Sig: Take 1 tablet by mouth every 6 (six) hours as needed for Pain.   NIFEdipine ER 30 MG Oral Tablet 24 Hr   Yes Yes   Sig: Take 1 tablet (30 mg total) by mouth daily.   Patient taking differently: Take 1 tablet (30 mg total) by mouth daily as needed (DO NOT TAKE IF SBP IS BELOW 130).   VYVANSE 60 MG Oral Cap 3/4/2025 Morning  Yes Yes   Sig: Take 1 capsule (60 mg total) by mouth every morning.   albuterol 108 (90 Base) MCG/ACT Inhalation Aero Soln Past Week  Yes Yes   Sig: Inhale 2 puffs into the lungs every 6 (six) hours as needed for Wheezing.   buPROPion  MG Oral Tablet 24 Hr 3/4/2025  Yes Yes   Sig: Take 1 tablet (150 mg total) by mouth daily.   carvedilol 25 MG Oral Tab 3/4/2025  No Yes   Sig: Take 1 tablet (25 mg total) by mouth in the morning and 1 tablet (25 mg total) in the evening. Take with meals.    cloNIDine 0.1 MG Oral Tab 3/3/2025  Yes Yes   Sig: Take 1 tablet (0.1 mg total) by mouth nightly.   ergocalciferol 1.25 MG (40051 UT) Oral Cap   Yes Yes   Sig: Take 1 capsule (50,000 Units total) by mouth once a week.   gabapentin 100 MG Oral Cap 3/4/2025  No Yes   Sig: Take 2 capsules (200 mg total) by mouth 3 (three) times daily.   hydrALAZINE 25 MG Oral Tab 3/4/2025  Yes Yes   Sig: Take 1 tablet (25 mg total) by mouth 2 (two) times daily.   Patient taking differently: Take 1 tablet (25 mg total) by mouth 3 (three) times daily.   lamoTRIgine 100 MG Oral Tab 3/4/2025 Morning  Yes Yes   Sig: Take 1 tablet (100 mg total) by mouth daily.   lidocaine 5 % External Patch Past Week  No Yes   Sig: Place 1 patch onto the skin daily.   Patient taking differently: Place 1 patch onto the skin daily as needed.   losartan 100 MG Oral Tab   Yes Yes   Sig: Take 1 tablet (100 mg total) by mouth daily.   ondansetron 4 MG Oral Tablet Dispersible Past Week  Yes Yes   Sig: Take 1 tablet (4 mg total) by mouth every 8 (eight) hours as needed for Nausea.   tamsulosin 0.4 MG Oral Cap 3/3/2025 Evening  Yes Yes   Sig: Take 1 capsule (0.4 mg total) by mouth daily.      Facility-Administered Medications: None            Review of Systems:  Negative except as stated  GENERAL HEALTH: otherwise feels okay   RESPIRATORY: denies new/changing shortness of breath to my interview   CARDIOVASCULAR: denies chest pain to my intervie  GI: as above  GENITAL//GYN: as above  MUSCULOSKELETAL: denies new joint issues  NEURO: denies new sensory or motor complaint  PSYCHE: mood is unchanged a except as stated above  HEMATOLOGY: denies bruising or excessive bleeding except as stated   ALLERGY/IMM.:medication allergies as above           PHYSICAL EXAM   BP (!) 172/93   Pulse 93   Temp 97.8 °F (36.6 °C) (Oral)   Resp 20   Ht 6' 2\" (1.88 m)   Wt 225 lb (102.1 kg)   SpO2 93%   BMI 28.89 kg/m²     GENERAL: well developed, well nourished, in no apparent  distress, non toxic appearing  HEENT: normocephalic, mucous membranes moist.  EYES: non icteric   NECK:non tender, supple  RESPIRATORY: clear to percussion and auscultation  CARDIOVASCULAR: reg rate    ABDOMEN: normal, active bowel sounds, no masses, HSM or tenderness, soft, nondistended, no G/R/R and soft even w/ deep palpation  RECTAL: smear of flecks of light brown stool, no gross blood   EXTREMITIES: no le edema  NEURO:  Oriented x 3   BACK: no CVA tenderness  PSYCH: appropriate for the exam          LAB/IMAGING RESULTS:        Recent Labs   Lab 03/04/25 1739 03/05/25  0803   * 121*   BUN 67* 74*   CREATSERUM 10.02* 10.40*   CA 8.7 8.2*    141   K 3.6 3.7    107   CO2 18.0* 19.0*       Recent Labs   Lab 03/04/25 1739 03/05/25  0803   RBC 2.69* 2.62*   HGB 8.7* 8.3*   HCT 24.9* 23.9*   MCV 92.6 91.2   MCH 32.3 31.7   MCHC 34.9 34.7   RDW 14.3 14.4   NEPRELIM 6.40 5.39   WBC 8.1 7.9   .0 225.0       Recent Labs   Lab 03/04/25 1739 03/05/25  0803   ALKPHO 97  --    BILT 0.4  --    TP 7.0  --    ALB 3.9 3.9   ALT 35  --    AST 33  --        No results for input(s): \"JAMIL\", \"LIP\" in the last 168 hours.        ASSESSMENT AND PLAN:   1 n/v/d, abd discomfort -- states improved since admit.  Imaging pending, no bms' for stool studies.   Was on cipro for prostatitis and cdiff possible.  Viral infection possible as well    Describes dark stool, but flecks of light brown stool on my laverne and has hx of chronic anemia and elected prn transfusions in the past    Multiple options for how to proceed were discussed in detail with pt/family, they are agreeable to the following plan ...  --await stool/imaging  --clears depending on imaging  --transfuse as needed  --if rapid drop in hg, he will reconsider endoscopic eval  --may have some improvement in symptoms after HD as well  --could consider ppi if on asa in the future if he is willing            pt demonstrated understanding of risks of  morbidity/mortality if diagnoses and/or treatments were delayed balanced against risks of further investigation and/or treatment.       --the pt demonstrated understanding of the results and recommendations and options and the risk/benefit/limitations and alternatives to the plan.  All of their questions and concerns were addressed and were agreeable to the plan as listed      Discussed w/ Dr Viky Noguera MD  3/5/2025  10:57 AM         [1]   Allergies  Allergen Reactions    Hydrochlorothiazide RASH and HIVES    Fentanyl ITCHING and NAUSEA ONLY

## 2025-03-06 LAB
ALBUMIN SERPL-MCNC: 3.8 G/DL (ref 3.2–4.8)
ANION GAP SERPL CALC-SCNC: 11 MMOL/L (ref 0–18)
ATRIAL RATE: 90 BPM
BUN BLD-MCNC: 42 MG/DL (ref 9–23)
C DIFF TOX B STL QL: NEGATIVE
CALCIUM BLD-MCNC: 8.8 MG/DL (ref 8.7–10.6)
CHLORIDE SERPL-SCNC: 102 MMOL/L (ref 98–112)
CO2 SERPL-SCNC: 27 MMOL/L (ref 21–32)
CREAT BLD-MCNC: 7.33 MG/DL
EGFRCR SERPLBLD CKD-EPI 2021: 9 ML/MIN/1.73M2 (ref 60–?)
GLUCOSE BLD-MCNC: 97 MG/DL (ref 70–99)
HGB BLD-MCNC: 8.1 G/DL
MAGNESIUM SERPL-MCNC: 1.8 MG/DL (ref 1.6–2.6)
OSMOLALITY SERPL CALC.SUM OF ELEC: 300 MOSM/KG (ref 275–295)
P AXIS: 7 DEGREES
P-R INTERVAL: 160 MS
PHOSPHATE SERPL-MCNC: 5 MG/DL (ref 2.4–5.1)
POTASSIUM SERPL-SCNC: 3.4 MMOL/L (ref 3.5–5.1)
Q-T INTERVAL: 426 MS
QRS DURATION: 106 MS
QTC CALCULATION (BEZET): 521 MS
R AXIS: 52 DEGREES
SODIUM SERPL-SCNC: 140 MMOL/L (ref 136–145)
T AXIS: 191 DEGREES
VENTRICULAR RATE: 90 BPM

## 2025-03-06 PROCEDURE — 83735 ASSAY OF MAGNESIUM: CPT | Performed by: INTERNAL MEDICINE

## 2025-03-06 PROCEDURE — 36415 COLL VENOUS BLD VENIPUNCTURE: CPT | Performed by: INTERNAL MEDICINE

## 2025-03-06 PROCEDURE — 93010 ELECTROCARDIOGRAM REPORT: CPT | Performed by: INTERNAL MEDICINE

## 2025-03-06 PROCEDURE — 87493 C DIFF AMPLIFIED PROBE: CPT | Performed by: INTERNAL MEDICINE

## 2025-03-06 PROCEDURE — 93005 ELECTROCARDIOGRAM TRACING: CPT

## 2025-03-06 PROCEDURE — 85018 HEMOGLOBIN: CPT | Performed by: INTERNAL MEDICINE

## 2025-03-06 PROCEDURE — 90935 HEMODIALYSIS ONE EVALUATION: CPT | Performed by: INTERNAL MEDICINE

## 2025-03-06 PROCEDURE — 80069 RENAL FUNCTION PANEL: CPT | Performed by: INTERNAL MEDICINE

## 2025-03-06 RX ORDER — POTASSIUM CHLORIDE 1500 MG/1
20 TABLET, EXTENDED RELEASE ORAL ONCE
Status: COMPLETED | OUTPATIENT
Start: 2025-03-06 | End: 2025-03-06

## 2025-03-06 RX ORDER — ALBUMIN (HUMAN) 12.5 G/50ML
25 SOLUTION INTRAVENOUS
Status: DISCONTINUED | OUTPATIENT
Start: 2025-03-06 | End: 2025-03-07

## 2025-03-06 RX ORDER — LOSARTAN POTASSIUM 100 MG/1
100 TABLET ORAL DAILY
Status: DISCONTINUED | OUTPATIENT
Start: 2025-03-06 | End: 2025-03-07

## 2025-03-06 RX ORDER — BUTALBITAL, ACETAMINOPHEN AND CAFFEINE 50; 325; 40 MG/1; MG/1; MG/1
1 TABLET ORAL EVERY 4 HOURS PRN
Status: DISCONTINUED | OUTPATIENT
Start: 2025-03-06 | End: 2025-03-07

## 2025-03-06 RX ORDER — BUTALBITAL, ACETAMINOPHEN AND CAFFEINE 50; 325; 40 MG/1; MG/1; MG/1
1 TABLET ORAL ONCE
Status: COMPLETED | OUTPATIENT
Start: 2025-03-06 | End: 2025-03-06

## 2025-03-06 NOTE — PROGRESS NOTES
[facility]   part of [facility]    Nephrology Progress Note    Godwin Fonseca Attending:  Regina Salmon MD       SUBJECTIVE:     Complaining of orthopnea and abd bloating.  He feels he is volume overloaded.  He underwent HD yesterday with 1.4L UF.    PHYSICAL EXAM:     Vital Signs: BP (!) 155/105 (BP Location: Left arm)   Pulse 98   Temp 98.2 °F (36.8 °C) (Oral)   Resp 19   Ht 188 cm (6' 2\")   Wt 225 lb (102.1 kg)   SpO2 99%   BMI 28.89 kg/m²   Temp (24hrs), Av.1 °F (36.7 °C), Min:98 °F (36.7 °C), Max:98.2 °F (36.8 °C)       Intake/Output Summary (Last 24 hours) at 3/6/2025 1246  Last data filed at 3/6/2025 0930  Gross per 24 hour   Intake 1798 ml   Output --   Net 1798 ml     Wt Readings from Last 3 Encounters:   25 225 lb (102.1 kg)   25 246 lb (111.6 kg)   25 246 lb 14.6 oz (112 kg)          General: nad  Skin: no visible rashes  HEENT: NCAT  Cardiac: Regular rate and rhythm, no murmur/gallop or rub  Lungs: CTAB, no wheeze, no rale, no rhonchi  Abdomen: Soft, mild distension  Extremities: warm, well perfused, no leg edema  Neurologic/Psych: mentating well  LUE AVF   LABORATORY DATA:     Lab Results   Component Value Date    GLU 97 2025    BUN 42 (H) 2025    BUNCREA 13.0 2022    CREATSERUM 7.33 (H) 2025    ANIONGAP 11 2025    GFR 59 (L) 2018    GFRNAA 30 (L) 2022    GFRAA 35 (L) 2022    CA 8.8 2025    OSMOCALC 300 (H) 2025    ALKPHO 97 2025    AST 33 2025    ALT 35 2025    BILT 0.4 2025    TP 7.0 2025    ALB 3.8 2025    GLOBULIN 3.1 2025     2025    K 3.4 (L) 2025     2025    CO2 27.0 2025     Lab Results   Component Value Date    WBC 7.9 2025    RBC 2.62 (L) 2025    HGB 8.1 (L) 2025    HCT 23.9 (L) 2025    .0 2025    MPV 11.5 2012    MCV 91.2 2025    MCH 31.7 2025    MCHC 34.7 2025     RDW 14.4 03/05/2025    NEPRELIM 5.39 03/05/2025    NEPERCENT 67.9 03/05/2025    LYPERCENT 14.1 03/05/2025    MOPERCENT 11.9 03/05/2025    EOPERCENT 4.7 03/05/2025    BAPERCENT 1.1 03/05/2025    NE 5.39 03/05/2025    LYMABS 1.12 03/05/2025    MOABSO 0.94 03/05/2025    EOABSO 0.37 03/05/2025    BAABSO 0.09 03/05/2025     Lab Results   Component Value Date    MALBP 167.00 05/24/2023    CREUR 142.00 05/24/2023    CREUR 142.00 05/24/2023     Lab Results   Component Value Date    COLORUR Light-Yellow 11/17/2024    CLARITY Clear 11/17/2024    SPECGRAVITY 1.013 11/17/2024    GLUUR Normal 11/17/2024    BILUR Negative 11/17/2024    KETUR Negative 11/17/2024    BLOODURINE Negative 11/17/2024    PHURINE 8.5 (H) 11/17/2024    PROUR 100 (A) 11/17/2024    UROBILINOGEN Normal 11/17/2024    NITRITE Negative 11/17/2024    LEUUR Negative 11/17/2024    WBCUR 1-5 11/17/2024    RBCUR 0-2 11/17/2024    EPIUR Few (A) 11/17/2024    BACUR Rare (A) 11/17/2024    CAOXUR Occasional (A) 04/20/2018    HYLUR Present (A) 05/14/2019         IMAGING:     Reviewed.    MEDICATIONS:       Current Facility-Administered Medications   Medication Dose Route Frequency    butalbital-acetaminophen-caffeine (Fioricet) -40 MG per tab 1 tablet  1 tablet Oral Q4H PRN    sodium chloride 0.9 % IV bolus 100 mL  100 mL Intravenous Q30 Min PRN    And    albumin human (Albumin) 25% injection 25 g  25 g Intravenous PRN Dialysis    losartan (Cozaar) tab 100 mg  100 mg Oral Daily    traMADol (Ultram) tab 50 mg  50 mg Oral Q12H PRN    sevelamer carbonate (Renvela) tab 2,400 mg  2,400 mg Oral TID CC    ondansetron (Zofran) 4 MG/2ML injection 4 mg  4 mg Intravenous Q6H PRN    pantoprazole (Protonix) 40 mg in sodium chloride 0.9% PF 10 mL IV push  40 mg Intravenous Q24H    cloNIDine (Catapres) tab 0.1 mg  0.1 mg Oral BID    heparin (Porcine) 5000 UNIT/ML injection 5,000 Units  5,000 Units Subcutaneous Q8H MARTHA    acetaminophen (Tylenol Extra Strength) tab 500 mg  500  mg Oral Q4H PRN    melatonin tab 3 mg  3 mg Oral Nightly PRN    prochlorperazine (Compazine) 10 MG/2ML injection 5 mg  5 mg Intravenous Q8H PRN    polyethylene glycol (PEG 3350) (Miralax) 17 g oral packet 17 g  17 g Oral Daily PRN    sennosides (Senokot) tab 17.2 mg  17.2 mg Oral Nightly PRN    bisacodyl (Dulcolax) 10 MG rectal suppository 10 mg  10 mg Rectal Daily PRN    hydrALAzine (Apresoline) 20 mg/mL injection 10 mg  10 mg Intravenous Q6H PRN    albuterol (Ventolin HFA) 108 (90 Base) MCG/ACT inhaler 2 puff  2 puff Inhalation Q6H PRN    ARIPiprazole (Abilify) tab 15 mg  15 mg Oral Daily    buPROPion ER (Wellbutrin XL) 24 hr tab 150 mg  150 mg Oral Daily    calcium acetate (Phoslo) cap 667 mg  667 mg Oral TID with meals    carvedilol (Coreg) tab 25 mg  25 mg Oral BID with meals    [START ON 3/10/2025] cholecalciferol (Vitamin D3) tab 2,000 Units  2,000 Units Oral Weekly    FLUoxetine (PROzac) cap 40 mg  40 mg Oral Daily    gabapentin (Neurontin) cap 200 mg  200 mg Oral TID    hydrALAZINE (Apresoline) tab 25 mg  25 mg Oral TID    lamoTRIgine (LaMICtal) tab 100 mg  100 mg Oral Daily    NIFEdipine ER (Procardia-XL) 24 hr tab 30 mg  30 mg Oral Daily    tamsulosin (Flomax) cap 0.4 mg  0.4 mg Oral Daily    [Held by provider] lisdexamfetamine (Vyvanse) cap 60 mg  60 mg Oral QAM    sodium chloride 0.9 % IV bolus 100 mL  100 mL Intravenous Q30 Min PRN    And    albumin human (Albumin) 25% injection 25 g  25 g Intravenous PRN Dialysis       ASSESSMENT/PLAN:     45 yo M with history of ESRD on iHD MWF via LUE AVF, HTN, severe MR s/p MVR x 2, HFpEF, DVT/PE, TIA, MGUS, bipolar disorder, recurrent GI bleed, diffuse body pain presented with n/v/poor oral intake x few days.     ESRD:  -- isolated UF 2-3L today  -- HD MWF per schedule     Anemia:  -- epo 5000 u given on 3/5     MBD:  -- increased sevelamer to 2400 TID AC; resume  -- ca acetate 667 tid ac  -- low phos diet     Hypokalemia:  -- kcl 20    HTN:  -- pt takes coreg and  losartan pre-HD  -- takes remaining hypertensives pre-HD if systolic > 180 but should take clonidine consistently to avoid rebound and changed clonidine to BID      LUE AVF:  -- L arm precautions     N/v:  -- GI consulted     Will follow.       Thank you for allowing me to participate in the care of this patient. Please do not hesitate to call with questions or concerns.        Lenka Bond MD  [facility]

## 2025-03-06 NOTE — PROGRESS NOTES
[facility] Hospitalist Progress Note       SUBJECTIVE:  Pt feels okay  Feels like he needs another session of dilaysis has some SOB d/w renal plan from another session   No cough no nausea tolerating diet but having some diarrhea     OBJECTIVE:  Scheduled Meds:    losartan  100 mg Oral Daily    cefTRIAXone  2 g Intravenous Q24H    sevelamer carbonate  2,400 mg Oral TID CC    pantoprazole  40 mg Intravenous Q24H    cloNIDine  0.1 mg Oral BID    heparin  5,000 Units Subcutaneous Q8H MARTHA    ARIPiprazole  15 mg Oral Daily    buPROPion ER  150 mg Oral Daily    calcium acetate  667 mg Oral TID with meals    carvedilol  25 mg Oral BID with meals    [START ON 3/10/2025] cholecalciferol  2,000 Units Oral Weekly    FLUoxetine HCl  40 mg Oral Daily    gabapentin  200 mg Oral TID    hydrALAZINE  25 mg Oral TID    lamoTRIgine  100 mg Oral Daily    NIFEdipine ER  30 mg Oral Daily    tamsulosin  0.4 mg Oral Daily    [Held by provider] Lisdexamfetamine Dimesylate  60 mg Oral QAM    doxycycline  100 mg Intravenous Q12H     Continuous Infusions:   PRN Meds:   butalbital-acetaminophen-caffeine    sodium chloride **AND** albumin human    traMADol    ondansetron    acetaminophen    melatonin    prochlorperazine    polyethylene glycol (PEG 3350)    sennosides    bisacodyl    hydrALAzine    albuterol    sodium chloride **AND** albumin human    Vitals  Vitals:    03/06/25 0810   BP: (!) 155/105   Pulse: 98   Resp: 19   Temp: 98.2 °F (36.8 °C)         Exam   Gen-    no acute distress, alert and oriented x 3   RESP-   Lungs CTA, normal respiratory effort  CV-      Heart RRR, no mgr  Abd-    soft, nondistended, nontender, bowel sounds present  Skin-    no rash  Neuro-  no focal neurologic deficits  Ext-      No edema in extremities   Psych- alert and oriented x 3     Labs:     Recent Labs   Lab 03/04/25  1739 03/05/25  0803 03/05/25  1200 03/06/25  0732   WBC 8.1 7.9  --   --    HGB 8.7* 8.3* 8.5* 8.1*   MCV 92.6 91.2  --   --    .0 225.0  --    --        Recent Labs   Lab 03/04/25 1739 03/05/25  0803 03/06/25  0556    141 140   K 3.6 3.7 3.4*    107 102   CO2 18.0* 19.0* 27.0   BUN 67* 74* 42*   CREATSERUM 10.02* 10.40* 7.33*   CA 8.7 8.2* 8.8   MG  --  2.0 1.8   PHOS  --  6.9* 5.0   * 121* 97       Recent Labs   Lab 03/04/25 1739 03/05/25  0803 03/06/25  0556   ALT 35  --   --    AST 33  --   --    ALB 3.9 3.9 3.8       No results for input(s): \"PGLU\" in the last 168 hours.    AP:  44 year old male PMHx of ESRD MWF, HTN, Severe MR s/p MVR, HFpEF, Hx of Prior DVT/PE, TIA, Bipolar Disorder, Recurrent prior GI bleeds who presents to the hospital after missing dialysis 3/3 and also with nausea/vomiting and generalized weakness.  Last got dialysis on Friday 2/28.                Plan:    N/V, generalized weakness, abd discomfort - now resolved   - CT no acute issues noted   - d/w Dr. Noguera, hgb stable, no further work up in house and followed as outpt per Dr. Salinas's note 1/2025, follow up as outpt      HTNsive urgency - imrpved   - home meds restart   - CTOH no acute issues noted, possible artifact vs aneursym, follow up as outpt      ESRD on MWF  - per renal   - pt missed HD on 3/3 due to n/v      Abnl chest xray   - but per CT - looks more like scarring and atlectasis, will dc bx for now     Diarrhea  - check cdiff      HFpEF  - compensated     Bipolar disorder  - home meds     Severe MR s/p MVR  - stable       Prophylaxis:  DVT: heparin subcutaneous      Full          Regina Salmon MD

## 2025-03-06 NOTE — PLAN OF CARE
Problem: GASTROINTESTINAL - ADULT  Goal: Minimal or absence of nausea and vomiting  Description: INTERVENTIONS:  - Maintain adequate hydration with IV or PO as ordered and tolerated  - Nasogastric tube to low intermittent suction as ordered  - Evaluate effectiveness of ordered antiemetic medications  - Provide nonpharmacologic comfort measures as appropriate  - Advance diet as tolerated, if ordered  - Obtain nutritional consult as needed  - Evaluate fluid balance  Outcome: Progressing  Goal: Maintains or returns to baseline bowel function  Description: INTERVENTIONS:  - Assess bowel function  - Maintain adequate hydration with IV or PO as ordered and tolerated  - Evaluate effectiveness of GI medications  - Encourage mobilization and activity  - Obtain nutritional consult as needed  - Establish a toileting routine/schedule  - Consider collaborating with pharmacy to review patient's medication profile  Outcome: Progressing     Problem: METABOLIC/FLUID AND ELECTROLYTES - ADULT  Goal: Electrolytes maintained within normal limits  Description: INTERVENTIONS:  - Monitor labs and rhythm and assess patient for signs and symptoms of electrolyte imbalances  - Administer electrolyte replacement as ordered  - Monitor response to electrolyte replacements, including rhythm and repeat lab results as appropriate  - Fluid restriction as ordered  - Instruct patient on fluid and nutrition restrictions as appropriate  Outcome: Progressing     Problem: PAIN - ADULT  Goal: Verbalizes/displays adequate comfort level or patient's stated pain goal  Description: INTERVENTIONS:  - Encourage pt to monitor pain and request assistance  - Assess pain using appropriate pain scale  - Administer analgesics based on type and severity of pain and evaluate response  - Implement non-pharmacological measures as appropriate and evaluate response  - Consider cultural and social influences on pain and pain management  - Manage/alleviate anxiety  - Utilize  distraction and/or relaxation techniques  - Monitor for opioid side effects  - Notify MD/LIP if interventions unsuccessful or patient reports new pain  - Anticipate increased pain with activity and pre-medicate as appropriate  Outcome: Progressing     Problem: RISK FOR INFECTION - ADULT  Goal: Absence of fever/infection during anticipated neutropenic period  Description: INTERVENTIONS  - Monitor WBC  - Administer growth factors as ordered  - Implement neutropenic guidelines  Outcome: Progressing     Patient alert and oriented, states headache with improvement s/p firoicet, pt also reports that blurred vision from last night is resolving. Tolerates diet, diarrhea sample sent for C diff, C diff negative. Urine samples ordered, pt anuric during shift. Patient c/o  mild chest tightness/fulness in AM stating he feels that he has 'extra water' on him. Pt reports this feeling normally resolves after HD but did not resolve yesterday. Writer informed Dr. Salmon, EKG ordered and reviewed by Dr. Salmon at bedside, plan for additional dialysis session today. KIT digitalius notified of UF ordered for today. Dialysis at bedside. Patient reports improvement in his headache and declines need for further medications, heat packs provided. Patient updated progressing and in agreement with POC. Fall and safety precautions in place.

## 2025-03-06 NOTE — PROGRESS NOTES
[facility]  Progress Note    Godwin Fonseca Patient Status:  Observation    1980 MRN [id number]   [facility] 4NW-A Attending Regina Salmon MD   Hosp Day # 1 PCP Adrian Small MD     Subjective:  Godwin Fonseca is a(n) 44 year old male.         Current complaints:     Denies new issues    States n/v/d, dyspepsia and other gi symptoms resolved    Paresh diet so far, wants to have renal diet      Current Facility-Administered Medications   Medication Dose Route Frequency    cefTRIAXone (Rocephin) 2 g in sodium chloride 0.9% 100 mL IVPB-ADDV  2 g Intravenous Q24H    traMADol (Ultram) tab 50 mg  50 mg Oral Q12H PRN    sevelamer carbonate (Renvela) tab 2,400 mg  2,400 mg Oral TID CC    ondansetron (Zofran) 4 MG/2ML injection 4 mg  4 mg Intravenous Q6H PRN    pantoprazole (Protonix) 40 mg in sodium chloride 0.9% PF 10 mL IV push  40 mg Intravenous Q24H    cloNIDine (Catapres) tab 0.1 mg  0.1 mg Oral BID    heparin (Porcine) 5000 UNIT/ML injection 5,000 Units  5,000 Units Subcutaneous Q8H MARTHA    acetaminophen (Tylenol Extra Strength) tab 500 mg  500 mg Oral Q4H PRN    melatonin tab 3 mg  3 mg Oral Nightly PRN    prochlorperazine (Compazine) 10 MG/2ML injection 5 mg  5 mg Intravenous Q8H PRN    polyethylene glycol (PEG 3350) (Miralax) 17 g oral packet 17 g  17 g Oral Daily PRN    sennosides (Senokot) tab 17.2 mg  17.2 mg Oral Nightly PRN    bisacodyl (Dulcolax) 10 MG rectal suppository 10 mg  10 mg Rectal Daily PRN    hydrALAzine (Apresoline) 20 mg/mL injection 10 mg  10 mg Intravenous Q6H PRN    albuterol (Ventolin HFA) 108 (90 Base) MCG/ACT inhaler 2 puff  2 puff Inhalation Q6H PRN    ARIPiprazole (Abilify) tab 15 mg  15 mg Oral Daily    buPROPion ER (Wellbutrin XL) 24 hr tab 150 mg  150 mg Oral Daily    calcium acetate (Phoslo) cap 667 mg  667 mg Oral TID with meals    carvedilol (Coreg) tab 25 mg  25 mg Oral BID with meals    [START ON 3/10/2025] cholecalciferol (Vitamin D3) tab 2,000 Units  2,000  Units Oral Weekly    FLUoxetine (PROzac) cap 40 mg  40 mg Oral Daily    gabapentin (Neurontin) cap 200 mg  200 mg Oral TID    hydrALAZINE (Apresoline) tab 25 mg  25 mg Oral TID    lamoTRIgine (LaMICtal) tab 100 mg  100 mg Oral Daily    NIFEdipine ER (Procardia-XL) 24 hr tab 30 mg  30 mg Oral Daily    tamsulosin (Flomax) cap 0.4 mg  0.4 mg Oral Daily    [Held by provider] lisdexamfetamine (Vyvanse) cap 60 mg  60 mg Oral QAM    sodium chloride 0.9 % IV bolus 100 mL  100 mL Intravenous Q30 Min PRN    And    albumin human (Albumin) 25% injection 25 g  25 g Intravenous PRN Dialysis    doxycycline hyclate (Vibramycin) 100 mg in sodium chloride 0.9% 100 mL IVPB  100 mg Intravenous Q12H        Objective:    /78 (BP Location: Right arm)   Pulse 89   Temp 98.1 °F (36.7 °C) (Oral)   Resp 20   Ht 6' 2\" (1.88 m)   Wt 225 lb (102.1 kg)   SpO2 100%   BMI 28.89 kg/m²   General appearance: alert, appears stated age, and cooperative  Lungs: clear to auscultation bilaterally  Heart: reg rate   Abdomen: soft, non-tender; bowel sounds normal; no masses,  no organomegaly , no pain even w/ deep palpation  Extremities: no le edema  Neurologic: Grossly normal           Labs:     Recent Labs   Lab 03/04/25  1739 03/05/25  0803 03/05/25  1200   RBC 2.69* 2.62*  --    HGB 8.7* 8.3* 8.5*   HCT 24.9* 23.9*  --    MCV 92.6 91.2  --    MCH 32.3 31.7  --    MCHC 34.9 34.7  --    RDW 14.3 14.4  --    NEPRELIM 6.40 5.39  --    WBC 8.1 7.9  --    .0 225.0  --        Lab Results   Component Value Date    CREATSERUM 7.33 03/06/2025    BUN 42 03/06/2025     03/06/2025    K 3.4 03/06/2025     03/06/2025    CO2 27.0 03/06/2025    GLU 97 03/06/2025    CA 8.8 03/06/2025    MG 1.8 03/06/2025    PHOS 5.0 03/06/2025       Lab Results   Component Value Date    ALB 3.8 03/06/2025        )             Assessment and Plan:  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 (HCC)     Chronic non-seasonal allergic rhinitis    Chronic sinusitis, unspecified location    ADHD (attention deficit hyperactivity disorder), inattentive type    Bipolar depression ([facility])    Essential hypertension    Mild persistent asthma without complication ([facility])    CKD (chronic kidney disease) stage 3, GFR 30-59 ml/min ([facility])    Self-inflicted injury    Bipolar disorder in partial remission, most recent episode unspecified type (HCC)    Family history of prostate cancer    Fatigue, unspecified type    Alkaline phosphatase elevation    Hyperlipidemia, mixed    Low serum HDL    Spinal stenosis of lumbar region with neurogenic claudication    Prolapsed lumbar disc    Status post lumbar surgery    Cauda equina syndrome ([facility])    Lumbar disc prolapse with compression radiculopathy    Syncope and collapse    Hypokalemia    Orthostatic hypotension    Hypertension, unspecified type    Weight loss    Chest pain, unspecified type    History of mitral valve stenosis    Acute pericarditis, unspecified type ([facility])    Cervical radiculopathy    Elevated blood protein    IgG monoclonal protein disorder    MGUS (monoclonal gammopathy of unknown significance)    Hypertensive urgency    Bipolar 2 disorder ([facility])    Employment problem    Stress    Anxiety disorder, unspecified type    Weakness of left lower extremity    Rectal bleeding    Paresthesia of foot, bilateral    Obesity (BMI 30-39.9)    TIA (transient ischemic attack)    TMJ dysfunction    Inverted papilloma of nasal cavity    Type 2 diabetes mellitus with stage 3b chronic kidney disease, without long-term current use of insulin ([facility])    Acute kidney injury    Metabolic acidosis    Hyperglycemia    Chronic kidney disease, unspecified CKD stage    Abdominal distension    SOB (shortness of breath)    Hypertensive crisis    Acute on chronic congestive heart failure, unspecified heart failure type (HCC)    Acute congestive heart failure (HCC)    Acute congestive heart failure, unspecified  heart failure type (HCC)    Acute on chronic diastolic congestive heart failure (HCC)    Stage 4 chronic kidney disease (HCC)    ROBERT (acute kidney injury)    Abdominal pain, left lower quadrant    Hypertensive emergency    Acute chest pain    Acute on chronic renal insufficiency    Chronic abdominal pain    Nonintractable headache, unspecified chronicity pattern, unspecified headache type    Chronic right shoulder pain    HCAP (healthcare-associated pneumonia)    Acute intractable headache, unspecified headache type    ESRD (end stage renal disease) on dialysis (HCC)    Diarrhea, unspecified type    Primary hypertension    Dyspnea, unspecified type    Abdominal pain, acute    ESRD on dialysis (HCC)    Fluid overload    ESRD needing dialysis (HCC)    COVID-19 virus infection    Hypotension, unspecified hypotension type    Anemia    Acute renal failure (ARF)    Neck pain    Acute nonintractable headache, unspecified headache type    GI bleed    Chronic kidney disease with end stage renal failure on dialysis (HCC)    Lower abdominal pain    ESRD (end stage renal disease) (HCC)    Resistant hypertension    Community acquired pneumonia of right lower lobe of lung    Acute renal failure with acute renal cortical necrosis superimposed on stage 4 chronic kidney disease (HCC)    Acute renal failure, unspecified acute renal failure type    Hypervolemia, unspecified hypervolemia type    End stage renal disease on dialysis (HCC)    Acute respiratory failure with hypoxia (HCC)    Stage 5 chronic kidney disease on chronic dialysis (HCC)    Anemia, unspecified type    Hyperkalemia    Hemodialysis catheter infection, initial encounter    Type 2 diabetes mellitus with chronic kidney disease on chronic dialysis, without long-term current use of insulin (HCC)    Coronary artery disease involving native coronary artery of native heart without angina pectoris    Pneumonia of right lower lobe due to infectious organism    Expressive  aphasia    Uncontrolled hypertension    Bipolar disorder with moderate depression (HCC)    BRBPR (bright red blood per rectum)    Weakness    Nosebleed    ESRD on hemodialysis (HCC)    Acute colitis    Chest pain of uncertain etiology    Medically noncompliant    Dialysis patient, noncompliant    History of lower GI bleeding    Fever, unspecified fever cause    Nosocomial pneumonia    Nausea and vomiting in adult       1 n/v/d, abd discomfort -- all gi symptoms resolved.  Denies further bms, no stool studies.  Hg overnight stable, has chronic anemia and may cont to drop.      Multiple options for how to proceed were discussed in detail with pt/family, they are agreeable to the following plan ...  --defer ct findings to hospitalist regarding bladder  --check hg now and then periodically as outpt if stable, needs to be followed as outpt as per Dr Salinas's rec's 1/2025  --advised he f/u outpt w/ Dr Alfaro/Horace for chronic care      Otherwise okay to adv to renal diet    Will sign off for now, pls call w/ questions or clinical change    D/w rn today        pt demonstrated understanding of risks of morbidity/mortality if diagnoses and/or treatments were delayed balanced against risks of further investigation and/or treatment.     --patient demonstrated understanding of the results and recommendations and risks, benefits, limitations, and alternatives to the plan. All of their questions and concerns were addressed and were agreeable to the plan as listed      Bakari Noguera MD

## 2025-03-06 NOTE — PLAN OF CARE
Patient alert and oriented x4. VSS. Afebrile. No c/o n/v/diarrhea at this time. Tolerated FLD dinner. Advanced to soft. C/o headache. PRN tramadol and hot pack given. See MAR. Safety precautions continued. Call light within reach.   2358: Pt c/o blurry vision and rating headache at 7-8/10 despite PRN tramadol. Tylenol administered.  MD notified, order for fioricet x1 received   Problem: GASTROINTESTINAL - ADULT  Goal: Minimal or absence of nausea and vomiting  Description: INTERVENTIONS:  - Maintain adequate hydration with IV or PO as ordered and tolerated  - Nasogastric tube to low intermittent suction as ordered  - Evaluate effectiveness of ordered antiemetic medications  - Provide nonpharmacologic comfort measures as appropriate  - Advance diet as tolerated, if ordered  - Obtain nutritional consult as needed  - Evaluate fluid balance  Outcome: Progressing  Goal: Maintains or returns to baseline bowel function  Description: INTERVENTIONS:  - Assess bowel function  - Maintain adequate hydration with IV or PO as ordered and tolerated  - Evaluate effectiveness of GI medications  - Encourage mobilization and activity  - Obtain nutritional consult as needed  - Establish a toileting routine/schedule  - Consider collaborating with pharmacy to review patient's medication profile  Outcome: Progressing     Problem: PAIN - ADULT  Goal: Verbalizes/displays adequate comfort level or patient's stated pain goal  Description: INTERVENTIONS:  - Encourage pt to monitor pain and request assistance  - Assess pain using appropriate pain scale  - Administer analgesics based on type and severity of pain and evaluate response  - Implement non-pharmacological measures as appropriate and evaluate response  - Consider cultural and social influences on pain and pain management  - Manage/alleviate anxiety  - Utilize distraction and/or relaxation techniques  - Monitor for opioid side effects  - Notify MD/LIP if interventions unsuccessful or  patient reports new pain  - Anticipate increased pain with activity and pre-medicate as appropriate  Outcome: Not Progressing

## 2025-03-06 NOTE — PLAN OF CARE
Tolerated clear liquids w/ no nausea,vomiting & diarrhea. Not passing stools this shift. Advancing to full liquids for dinner.

## 2025-03-06 NOTE — CM/SW NOTE
This is a Code 44. Patient failed inpatient criteria. Second level of review completed and supports observation.  UR committee in agreement. UR RN Discussed with Dr. Salmon  who approves observation status.  Observation order placed. LUKE given to the patient and signed copy placed in their chart.    Alejandra Bela RN, /Utilization Review

## 2025-03-07 VITALS
DIASTOLIC BLOOD PRESSURE: 71 MMHG | OXYGEN SATURATION: 94 % | TEMPERATURE: 98 F | HEART RATE: 84 BPM | SYSTOLIC BLOOD PRESSURE: 112 MMHG | HEIGHT: 74 IN | BODY MASS INDEX: 28.88 KG/M2 | RESPIRATION RATE: 18 BRPM | WEIGHT: 225 LBS

## 2025-03-07 LAB
ALBUMIN SERPL-MCNC: 4 G/DL (ref 3.2–4.8)
ANION GAP SERPL CALC-SCNC: 14 MMOL/L (ref 0–18)
BASOPHILS # BLD AUTO: 0.08 X10(3) UL (ref 0–0.2)
BASOPHILS NFR BLD AUTO: 1.3 %
BUN BLD-MCNC: 55 MG/DL (ref 9–23)
CALCIUM BLD-MCNC: 8.9 MG/DL (ref 8.7–10.6)
CHLORIDE SERPL-SCNC: 105 MMOL/L (ref 98–112)
CO2 SERPL-SCNC: 23 MMOL/L (ref 21–32)
CREAT BLD-MCNC: 8.74 MG/DL
EGFRCR SERPLBLD CKD-EPI 2021: 7 ML/MIN/1.73M2 (ref 60–?)
EOSINOPHIL # BLD AUTO: 0.44 X10(3) UL (ref 0–0.7)
EOSINOPHIL NFR BLD AUTO: 6.9 %
ERYTHROCYTE [DISTWIDTH] IN BLOOD BY AUTOMATED COUNT: 14.9 %
GLUCOSE BLD-MCNC: 132 MG/DL (ref 70–99)
HCT VFR BLD AUTO: 23.5 %
HGB BLD-MCNC: 7.9 G/DL
HGB BLD-MCNC: 8.1 G/DL
IMM GRANULOCYTES # BLD AUTO: 0.03 X10(3) UL (ref 0–1)
IMM GRANULOCYTES NFR BLD: 0.5 %
L PNEUMO AG UR QL: NEGATIVE
LYMPHOCYTES # BLD AUTO: 0.94 X10(3) UL (ref 1–4)
LYMPHOCYTES NFR BLD AUTO: 14.8 %
MAGNESIUM SERPL-MCNC: 2 MG/DL (ref 1.6–2.6)
MCH RBC QN AUTO: 31.5 PG (ref 26–34)
MCHC RBC AUTO-ENTMCNC: 33.6 G/DL (ref 31–37)
MCV RBC AUTO: 93.6 FL
MONOCYTES # BLD AUTO: 0.91 X10(3) UL (ref 0.1–1)
MONOCYTES NFR BLD AUTO: 14.3 %
NEUTROPHILS # BLD AUTO: 3.96 X10 (3) UL (ref 1.5–7.7)
NEUTROPHILS # BLD AUTO: 3.96 X10(3) UL (ref 1.5–7.7)
NEUTROPHILS NFR BLD AUTO: 62.2 %
OSMOLALITY SERPL CALC.SUM OF ELEC: 311 MOSM/KG (ref 275–295)
PHOSPHATE SERPL-MCNC: 5.7 MG/DL (ref 2.4–5.1)
PLATELET # BLD AUTO: 215 10(3)UL (ref 150–450)
POTASSIUM SERPL-SCNC: 3.8 MMOL/L (ref 3.5–5.1)
RBC # BLD AUTO: 2.51 X10(6)UL
SODIUM SERPL-SCNC: 142 MMOL/L (ref 136–145)
STREP PNEUMO ANTIGEN, URINE: NEGATIVE
WBC # BLD AUTO: 6.4 X10(3) UL (ref 4–11)

## 2025-03-07 PROCEDURE — 85018 HEMOGLOBIN: CPT | Performed by: INTERNAL MEDICINE

## 2025-03-07 PROCEDURE — 87449 NOS EACH ORGANISM AG IA: CPT | Performed by: INTERNAL MEDICINE

## 2025-03-07 PROCEDURE — 90935 HEMODIALYSIS ONE EVALUATION: CPT | Performed by: INTERNAL MEDICINE

## 2025-03-07 PROCEDURE — 83735 ASSAY OF MAGNESIUM: CPT | Performed by: INTERNAL MEDICINE

## 2025-03-07 PROCEDURE — 85025 COMPLETE CBC W/AUTO DIFF WBC: CPT | Performed by: INTERNAL MEDICINE

## 2025-03-07 PROCEDURE — 80069 RENAL FUNCTION PANEL: CPT | Performed by: INTERNAL MEDICINE

## 2025-03-07 RX ORDER — SEVELAMER CARBONATE 800 MG/1
2400 TABLET, FILM COATED ORAL
Qty: 270 TABLET | Refills: 0 | Status: SHIPPED | OUTPATIENT
Start: 2025-03-07

## 2025-03-07 RX ORDER — ALBUMIN (HUMAN) 12.5 G/50ML
25 SOLUTION INTRAVENOUS
Status: DISCONTINUED | OUTPATIENT
Start: 2025-03-07 | End: 2025-03-07

## 2025-03-07 RX ORDER — CLONIDINE HYDROCHLORIDE 0.1 MG/1
0.1 TABLET ORAL 2 TIMES DAILY
Qty: 60 TABLET | Refills: 0 | Status: SHIPPED | OUTPATIENT
Start: 2025-03-07

## 2025-03-07 RX ORDER — CALCIUM ACETATE 667 MG/1
667 CAPSULE ORAL
Qty: 90 CAPSULE | Refills: 0 | Status: SHIPPED | OUTPATIENT
Start: 2025-03-07

## 2025-03-07 RX ORDER — NIFEDIPINE 30 MG
30 TABLET, EXTENDED RELEASE ORAL DAILY
Qty: 30 TABLET | Refills: 0 | Status: SHIPPED | OUTPATIENT
Start: 2025-03-08

## 2025-03-07 RX ORDER — HYDRALAZINE HYDROCHLORIDE 25 MG/1
25 TABLET, FILM COATED ORAL 3 TIMES DAILY
Status: SHIPPED | COMMUNITY
Start: 2025-03-07

## 2025-03-07 NOTE — DISCHARGE SUMMARY
[facility] [place]    Patient ID  Godwin Fonseca  [id number]  44 year old  4/12/1980  Admit date: 3/4/2025  Discharge date: No discharge date for patient encounter. ***  Attending: Regina Salmon MD   Primary Care Physician: Adrian Small MD ***  Reason for admission: *** (see HPI on HP for further detail)  Discharge condition: {condition:81262}  Disposition: {disposition:49661}    Important follow up:  -PCP within 7*** 14*** days or other ***  -specialists: ***    -labs: ***  -radiology: ***    Additional patient instructions  ***n/a    Discharge med list     Medication List        CONTINUE taking these medications      albuterol 108 (90 Base) MCG/ACT Aers  Commonly known as: Ventolin HFA     * ARIPiprazole 5 MG Tabs  Commonly known as: Abilify     * ARIPiprazole 10 MG Tabs  Commonly known as: Abilify     buPROPion  MG Tb24  Commonly known as: Wellbutrin XL     carvedilol 25 MG Tabs  Commonly known as: Coreg  Take 1 tablet (25 mg total) by mouth in the morning and 1 tablet (25 mg total) in the evening. Take with meals.     ergocalciferol 1.25 MG (42109 UT) Caps  Commonly known as: Vitamin D2     FLUoxetine HCl 40 MG Caps  Commonly known as: PROZAC  Take 1 capsule (40 mg total) by mouth daily.     fluticasone propionate 50 MCG/ACT Susp  Commonly known as: Flonase  SPRAY ONCE INTO EACH NOSTRIL BID PRN     gabapentin 100 MG Caps  Commonly known as: Neurontin  Take 2 capsules (200 mg total) by mouth 3 (three) times daily.     hydrALAZINE 25 MG Tabs  Commonly known as: Apresoline     lamoTRIgine 100 MG Tabs  Commonly known as: LaMICtal     losartan 100 MG Tabs  Commonly known as: Cozaar     ondansetron 4 MG Tbdp  Commonly known as: Zofran-ODT     tamsulosin 0.4 MG Caps  Commonly known as: Flomax     Vyvanse 60 MG Caps  Generic drug: Lisdexamfetamine Dimesylate           * This list has 2 medication(s) that are the same as other medications prescribed for you. Read the directions carefully, and ask your  doctor or other care provider to review them with you.                STOP taking these medications      HYDROcodone-acetaminophen  MG Tabs  Commonly known as: Norco            ASK your doctor about these medications      cloNIDine 0.1 MG Tabs  Commonly known as: Catapres     lidocaine 5 % Ptch  Commonly known as: Lidoderm  Place 1 patch onto the skin daily.     NIFEdipine ER 30 MG Tb24  Commonly known as: Adalat CC  Ask about: Which instructions should I use?              Discharge Diagnoses/Hospital course:  ***    Consults:  IP CONSULT TO HOSPITALIST  IP CONSULT TO NEPHROLOGY  IP CONSULT TO GASTROENTEROLOGY    Radiology:  CT BRAIN OR HEAD (CPT=70450)    Result Date: 3/5/2025  PROCEDURE:  CT BRAIN OR HEAD (06932)  [place]:  [place] CT BRAIN [place] HEAD ([id number]), 7/25/2015, 11:27 AM.  [place], CT, CT BRAIN OR HEAD ([id number]), 11/06/2024, 1:13 PM.  [place] , CT, CT BRAIN OR HEAD ([id number]), 1/19/2025, 11:07 AM.  INDICATIONS:  headache and high BP  TECHNIQUE:  Noncontrast CT scanning is performed through the brain. Dose reduction techniques were used. Dose information is transmitted to the ACR (American College of Radiology) NRDR (National Radiology Data Registry) which includes the Dose Index Registry.  PATIENT STATED HISTORY: (As transcribed by Technologist)  Headache    FINDINGS:  Ventricles and sulci are within normal limits.  There is no focal parenchymal attenuation abnormality.  There is no midline shift or mass-effect.  The basal cisterns are patent.  The gray-white matter differentiation is intact.  There is no acute intracranial hemorrhage or extra-axial fluid collection.   There is no evident fracture.  The visualized paranasal sinuses and mastoid air cells are unremarkable.  There is mild fullness at the tip of the basilar artery.             CONCLUSION:   1. No acute intracranial hemorrhage or hydrocephalus. If there is clinical concern for acute ischemia/infarction, an MRI of the brain would be  recommended for further evaluation.  2. There is mild fullness of the tip of the basilar artery.  This finding is nonspecific and likely artifactual, however, a nonemergent head MRA is recommended to exclude a basilar tip aneurysm.    LOCATION:  [id number]   Dictated by (CST): Stromberg, LeRoy, MD on 3/05/2025 at 1:27 PM     Finalized by (CST): Stromberg, LeRoy, MD on 3/05/2025 at 1:33 PM       CT ABDOMEN+PELVIS(CONTRAST ONLY)(CPT=74177)    Result Date: 3/5/2025  PROCEDURE:  CT ABDOMEN+PELVIS (CONTRAST ONLY) (CPT=74177)  COMPARISON:  None.  INDICATIONS:  Patient presents with diffuse abdominal pain.  TECHNIQUE:  CT scanning was performed from the dome of the diaphragm to the pubic symphysis with non-ionic intravenous contrast material. Post contrast coronal MPR imaging was performed.  Dose reduction techniques were used. Dose information is transmitted to the ACR (American College of Radiology) NRDR (National Radiology Data Registry) which includes the Dose Index Registry.  PATIENT STATED HISTORY:(As transcribed by Technologist)  Abdominal pain   CONTRAST USED:  100cc of Isovue 370  FINDINGS:  LIVER:  No enlargement, atrophy, abnormal density, or significant focal lesion.  BILIARY:  No visible dilatation or calcification.  PANCREAS:  No lesion, fluid collection, ductal dilatation, or atrophy.  SPLEEN:  No enlargement or focal lesion.  KIDNEYS:  No renal stones or hydronephrosis.  There is a small simple low-density cyst suggested along the midpole of the left kidney measuring 9 mm.  This is consistent with a benign cyst and no further workup is required or recommended.  ADRENALS:  No mass or enlargement.  AORTA/VASCULAR:  No aneurysm or dissection.  RETROPERITONEUM:  No mass or adenopathy.  BOWEL/MESENTERY:  No visible mass, obstruction, or bowel wall thickening.  Normal appendix.  There are scattered diverticular changes of the colon.  No evidence of acute diverticulitis or colitis. ABDOMINAL WALL:  No mass or hernia.   URINARY BLADDER:  There is mild bladder wall thickening, suggestive of cystitis.  No focal bladder lesions. PELVIC NODES:  No adenopathy.  PELVIC ORGANS:  No visible mass.  Pelvic organs appropriate for patient age.  BONES:  No bony lesion or fracture.  LUNG BASES:  There is a mild right effusion with passive atelectatic changes and/or scarring of the right lung base.            CONCLUSION:  1. There is mild diffuse bladder wall thickening suspicious for cystitis. 2. There is a mild right effusion with passive atelectatic changes and/or scarring of the right lung base. 3. Please see the body of the report above for further, less significant details.   LOCATION:  [facility]   Dictated by (CST): Mariam Hollis DO on 3/05/2025 at 1:29 PM     Finalized by (CST): Mariam Hollis DO on 3/05/2025 at 1:33 PM       XR CHEST AP PORTABLE  (CPT=71045)    Result Date: 3/4/2025  PROCEDURE:  XR CHEST AP PORTABLE  (CPT=71045)  TECHNIQUE:  AP chest radiograph was obtained.  COMPARISON:  [facility] , XR, XR CHEST AP PORTABLE  (CPT=71045), 2/02/2025, 8:18 AM.  INDICATIONS:  nvd since sunday. missed dialysis Monday  PATIENT STATED HISTORY: (As transcribed by Technologist)  Pt c/o nasuea,vomiting and diarrhea since 3/2/25.    FINDINGS:  Lung volumes are satisfactory.  New patchy opacities are seen in the mid to lower right lung with obscuration of the majority of the diaphragm contour.  The left lung remains clear.  No definite new pleural effusion.  Heart and pulmonary vessels appear stable, normal caliber.  Valve prosthetic ring noted.  The dialysis catheter is no longer visualized.  No pneumothorax.               CONCLUSION:  New airspace disease at the right lung base.  Correlate for pneumonia.  Continued clinical correlation and follow-up to radiographic resolution recommended.   LOCATION:  [facility]      Dictated by (CST): Jani Vasquez MD on 3/04/2025 at 6:18 PM     Finalized by (CST): Jani Vasquez MD on 3/04/2025 at 6:19 PM         Operative  reports:          Day of discharge exam:  Vitals:    03/07/25 1210   BP: (!) 175/103   Pulse: 94   Resp: 18   Temp: 98.1 °F (36.7 °C)     No acute distress, alert and oriented***  Lungs clear  Heart regular  Abdomen benign     Patient and/or family had opportunity to ask questions and expressed understanding and agreement with therapeutic plan as outlined      >30 minutes spent on discharge    Regina Salmon MD   obtained.  COMPARISON:  [facility] , XR, XR CHEST AP PORTABLE  (CPT=71045), 2/02/2025, 8:18 AM.  INDICATIONS:  nvd since sunday. missed dialysis Monday  PATIENT STATED HISTORY: (As transcribed by Technologist)  Pt c/o nasuea,vomiting and diarrhea since 3/2/25.    FINDINGS:  Lung volumes are satisfactory.  New patchy opacities are seen in the mid to lower right lung with obscuration of the majority of the diaphragm contour.  The left lung remains clear.  No definite new pleural effusion.  Heart and pulmonary vessels appear stable, normal caliber.  Valve prosthetic ring noted.  The dialysis catheter is no longer visualized.  No pneumothorax.               CONCLUSION:  New airspace disease at the right lung base.  Correlate for pneumonia.  Continued clinical correlation and follow-up to radiographic resolution recommended.   LOCATION:  [facility]      Dictated by (CST): Jani Vasquez MD on 3/04/2025 at 6:18 PM     Finalized by (CST): Jani Vasquez MD on 3/04/2025 at 6:19 PM         Operative reports:          Day of discharge exam:  Vitals:    03/07/25 1210   BP: (!) 175/103   Pulse: 94   Resp: 18   Temp: 98.1 °F (36.7 °C)     No acute distress,    Lungs clear  Heart regular  Abdomen benign     Patient and/or family had opportunity to ask questions and expressed understanding and agreement with therapeutic plan as outlined      31 minutes spent on discharge    Regina Salmon MD

## 2025-03-07 NOTE — PROGRESS NOTES
[facility] Hospitalist Progress Note       SUBJECTIVE:  Pt feels well this AM  No more SOB  No nausea no vomiting  Tolerating diet   BP better    Had diarrhea overnight, but none this AM    OBJECTIVE:  Scheduled Meds:    losartan  100 mg Oral Daily    sevelamer carbonate  2,400 mg Oral TID CC    pantoprazole  40 mg Intravenous Q24H    cloNIDine  0.1 mg Oral BID    heparin  5,000 Units Subcutaneous Q8H MARTHA    ARIPiprazole  15 mg Oral Daily    buPROPion ER  150 mg Oral Daily    calcium acetate  667 mg Oral TID with meals    carvedilol  25 mg Oral BID with meals    [START ON 3/10/2025] cholecalciferol  2,000 Units Oral Weekly    FLUoxetine HCl  40 mg Oral Daily    gabapentin  200 mg Oral TID    hydrALAZINE  25 mg Oral TID    lamoTRIgine  100 mg Oral Daily    NIFEdipine ER  30 mg Oral Daily    tamsulosin  0.4 mg Oral Daily    [Held by provider] Lisdexamfetamine Dimesylate  60 mg Oral QAM     Continuous Infusions:   PRN Meds:   sodium chloride **AND** albumin human    butalbital-acetaminophen-caffeine    sodium chloride **AND** albumin human    traMADol    ondansetron    acetaminophen    melatonin    prochlorperazine    polyethylene glycol (PEG 3350)    sennosides    bisacodyl    hydrALAzine    albuterol    Vitals  @3VITALS@     Exam   Gen-    no acute distress, alert    RESP-     normal respiratory effort  CV-      Heart RRR, no mgr  Abd-    soft, nondistended, nontender, bowel sounds present     Neuro-  no focal neurologic deficits  Ext-      No edema in extremities        Labs:     Recent Labs   Lab 03/04/25  1739 03/05/25  0803 03/05/25  1200 03/06/25  0732 03/07/25  0551   WBC 8.1 7.9  --   --  6.4   HGB 8.7* 8.3* 8.5* 8.1* 7.9*   MCV 92.6 91.2  --   --  93.6   .0 225.0  --   --  215.0       Recent Labs   Lab 03/04/25  1739 03/05/25  0803 03/06/25  0556 03/07/25  0551    141 140 142   K 3.6 3.7 3.4* 3.8    107 102 105   CO2 18.0* 19.0* 27.0 23.0   BUN 67* 74* 42* 55*   CREATSERUM 10.02* 10.40* 7.33*  8.74*   CA 8.7 8.2* 8.8 8.9   MG  --  2.0 1.8 2.0   PHOS  --  6.9* 5.0 5.7*   * 121* 97 132*       Recent Labs   Lab 03/04/25  1739 03/05/25  0803 03/06/25  0556 03/07/25  0551   ALT 35  --   --   --    AST 33  --   --   --    ALB 3.9 3.9 3.8 4.0       No results for input(s): \"PGLU\" in the last 168 hours.    AP:  44 year old male PMHx of ESRD MWF, HTN, Severe MR s/p MVR, HFpEF, Hx of Prior DVT/PE, TIA, Bipolar Disorder, Recurrent prior GI bleeds who presents to the hospital after missing dialysis 3/3 and also with nausea/vomiting and generalized weakness.  Last got dialysis on Friday 2/28.                Plan:    N/V, generalized weakness, abd discomfort - now resolved   - CT no acute issues noted   - d/w Dr. Noguera, hgb stable, no further work up in house and followed as outpt per Dr. Salinas's note 1/2025, follow up as outpt      HTNsive urgency - imrpved   - home meds restart   - CTOH no acute issues noted, possible artifact vs aneursym, follow up as outpt d/w pt      ESRD on MWF  - per renal   - pt missed HD on 3/3 due to n/v      Abnl chest xray   - but per CT - looks more like scarring and atlectasis, will dc abx for now     Diarrhea  - check cdiff - neg  - gi panel ordered      HFpEF  - compensated     Bipolar disorder  - home meds     Severe MR s/p MVR  - stable       Prophylaxis:  DVT: heparin subcutaneous      Full          Regina Salmon MD

## 2025-03-07 NOTE — PROGRESS NOTES
[facility]   part of [facility]    Nephrology Progress Note    Godwin Fonseca Attending:  Regina Salmon MD       SUBJECTIVE:     Feels better after he had 3L UF yesterday.  No complaints today.    PHYSICAL EXAM:     Vital Signs: BP (!) 159/102   Pulse 94   Temp 98.1 °F (36.7 °C) (Oral)   Resp 18   Ht 188 cm (6' 2\")   Wt 225 lb (102.1 kg)   SpO2 95%   BMI 28.89 kg/m²   Temp (24hrs), Av.1 °F (36.7 °C), Min:97.6 °F (36.4 °C), Max:98.6 °F (37 °C)       Intake/Output Summary (Last 24 hours) at 3/7/2025 143  Last data filed at 3/7/2025 1419  Gross per 24 hour   Intake 698 ml   Output 3125 ml   Net -2427 ml     Wt Readings from Last 3 Encounters:   25 225 lb (102.1 kg)   25 246 lb (111.6 kg)   25 246 lb 14.6 oz (112 kg)     General: nad  Skin: no visible rashes  HEENT: NCAT  Cardiac: Regular rate and rhythm, no murmur/gallop or rub  Lungs: CTAB, no wheeze, no rale, no rhonchi  Abdomen: Soft, mild distension  Extremities: warm, well perfused, no leg edema  Neurologic/Psych: mentating well  LUE AVF + thrill    LABORATORY DATA:     Lab Results   Component Value Date     (H) 2025    BUN 55 (H) 2025    BUNCREA 13.0 2022    CREATSERUM 8.74 (H) 2025    ANIONGAP 14 2025    GFR 59 (L) 2018    GFRNAA 30 (L) 2022    GFRAA 35 (L) 2022    CA 8.9 2025    OSMOCALC 311 (H) 2025    ALKPHO 97 2025    AST 33 2025    ALT 35 2025    BILT 0.4 2025    TP 7.0 2025    ALB 4.0 2025    GLOBULIN 3.1 2025     2025    K 3.8 2025     2025    CO2 23.0 2025     Lab Results   Component Value Date    WBC 6.4 2025    RBC 2.51 (L) 2025    HGB 8.1 (L) 2025    HCT 23.5 (L) 2025    .0 2025    MPV 11.5 2012    MCV 93.6 2025    MCH 31.5 2025    MCHC 33.6 2025    RDW 14.9 2025    NEPRELIM 3.96 2025    NEPERCENT  62.2 03/07/2025    LYPERCENT 14.8 03/07/2025    MOPERCENT 14.3 03/07/2025    EOPERCENT 6.9 03/07/2025    BAPERCENT 1.3 03/07/2025    NE 3.96 03/07/2025    LYMABS 0.94 (L) 03/07/2025    MOABSO 0.91 03/07/2025    EOABSO 0.44 03/07/2025    BAABSO 0.08 03/07/2025     Lab Results   Component Value Date    MALBP 167.00 05/24/2023    CREUR 142.00 05/24/2023    CREUR 142.00 05/24/2023     Lab Results   Component Value Date    COLORUR Light-Yellow 11/17/2024    CLARITY Clear 11/17/2024    SPECGRAVITY 1.013 11/17/2024    GLUUR Normal 11/17/2024    BILUR Negative 11/17/2024    KETUR Negative 11/17/2024    BLOODURINE Negative 11/17/2024    PHURINE 8.5 (H) 11/17/2024    PROUR 100 (A) 11/17/2024    UROBILINOGEN Normal 11/17/2024    NITRITE Negative 11/17/2024    LEUUR Negative 11/17/2024    WBCUR 1-5 11/17/2024    RBCUR 0-2 11/17/2024    EPIUR Few (A) 11/17/2024    BACUR Rare (A) 11/17/2024    CAOXUR Occasional (A) 04/20/2018    HYLUR Present (A) 05/14/2019         IMAGING:     Reviewed.    MEDICATIONS:       Current Facility-Administered Medications   Medication Dose Route Frequency    sodium chloride 0.9 % IV bolus 100 mL  100 mL Intravenous Q30 Min PRN    And    albumin human (Albumin) 25% injection 25 g  25 g Intravenous PRN Dialysis    butalbital-acetaminophen-caffeine (Fioricet) -40 MG per tab 1 tablet  1 tablet Oral Q4H PRN    losartan (Cozaar) tab 100 mg  100 mg Oral Daily    traMADol (Ultram) tab 50 mg  50 mg Oral Q12H PRN    sevelamer carbonate (Renvela) tab 2,400 mg  2,400 mg Oral TID CC    ondansetron (Zofran) 4 MG/2ML injection 4 mg  4 mg Intravenous Q6H PRN    pantoprazole (Protonix) 40 mg in sodium chloride 0.9% PF 10 mL IV push  40 mg Intravenous Q24H    cloNIDine (Catapres) tab 0.1 mg  0.1 mg Oral BID    heparin (Porcine) 5000 UNIT/ML injection 5,000 Units  5,000 Units Subcutaneous Q8H MARTHA    acetaminophen (Tylenol Extra Strength) tab 500 mg  500 mg Oral Q4H PRN    melatonin tab 3 mg  3 mg Oral Nightly PRN     prochlorperazine (Compazine) 10 MG/2ML injection 5 mg  5 mg Intravenous Q8H PRN    polyethylene glycol (PEG 3350) (Miralax) 17 g oral packet 17 g  17 g Oral Daily PRN    sennosides (Senokot) tab 17.2 mg  17.2 mg Oral Nightly PRN    bisacodyl (Dulcolax) 10 MG rectal suppository 10 mg  10 mg Rectal Daily PRN    hydrALAzine (Apresoline) 20 mg/mL injection 10 mg  10 mg Intravenous Q6H PRN    albuterol (Ventolin HFA) 108 (90 Base) MCG/ACT inhaler 2 puff  2 puff Inhalation Q6H PRN    ARIPiprazole (Abilify) tab 15 mg  15 mg Oral Daily    buPROPion ER (Wellbutrin XL) 24 hr tab 150 mg  150 mg Oral Daily    calcium acetate (Phoslo) cap 667 mg  667 mg Oral TID with meals    carvedilol (Coreg) tab 25 mg  25 mg Oral BID with meals    [START ON 3/10/2025] cholecalciferol (Vitamin D3) tab 2,000 Units  2,000 Units Oral Weekly    FLUoxetine (PROzac) cap 40 mg  40 mg Oral Daily    gabapentin (Neurontin) cap 200 mg  200 mg Oral TID    hydrALAZINE (Apresoline) tab 25 mg  25 mg Oral TID    lamoTRIgine (LaMICtal) tab 100 mg  100 mg Oral Daily    NIFEdipine ER (Procardia-XL) 24 hr tab 30 mg  30 mg Oral Daily    tamsulosin (Flomax) cap 0.4 mg  0.4 mg Oral Daily    [Held by provider] lisdexamfetamine (Vyvanse) cap 60 mg  60 mg Oral QAM       ASSESSMENT/PLAN:     43 yo M with history of ESRD on iHD MWF via LUE AVF, HTN, severe MR s/p MVR x 2, HFpEF, DVT/PE, TIA, MGUS, bipolar disorder, recurrent GI bleed, diffuse body pain presented with n/v/poor oral intake x few days.     ESRD:  -- HD MWF per schedule     Anemia:  -- epo 5000 with dialysis     MBD:  -- increased sevelamer to 2400 TID AC; resume  -- ca acetate 667 tid ac  -- low phos diet     Hypokalemia:  -- resolved     HTN:  -- pt takes coreg and losartan pre-HD  -- takes remaining hypertensives pre-HD if systolic > 180 but should take clonidine consistently to avoid rebound and changed clonidine to BID      LUE AVF:  -- L arm precautions     N/v:  -- GI consulted     Ok to discharge  from renal perspective after dialysis today.  Discsussed with RN.           Thank you for allowing me to participate in the care of this patient. Please do not hesitate to call with questions or concerns.        Lenka Bond MD  [facility]

## 2025-03-07 NOTE — PLAN OF CARE
Patient alert and oriented x4. VSS - BP improved after administering scheduled meds. See MAR. Afebrile. No c/o headache, blurry vision, SOB, or n/v. Reports still having diarrhea. MD notified, order for GI stool panel received. Appetite improving. Safety precautions continued. Call light within reach.   Problem: GASTROINTESTINAL - ADULT  Goal: Minimal or absence of nausea and vomiting  Description: INTERVENTIONS:  - Maintain adequate hydration with IV or PO as ordered and tolerated  - Nasogastric tube to low intermittent suction as ordered  - Evaluate effectiveness of ordered antiemetic medications  - Provide nonpharmacologic comfort measures as appropriate  - Advance diet as tolerated, if ordered  - Obtain nutritional consult as needed  - Evaluate fluid balance  Outcome: Progressing     Problem: PAIN - ADULT  Goal: Verbalizes/displays adequate comfort level or patient's stated pain goal  Description: INTERVENTIONS:  - Encourage pt to monitor pain and request assistance  - Assess pain using appropriate pain scale  - Administer analgesics based on type and severity of pain and evaluate response  - Implement non-pharmacological measures as appropriate and evaluate response  - Consider cultural and social influences on pain and pain management  - Manage/alleviate anxiety  - Utilize distraction and/or relaxation techniques  - Monitor for opioid side effects  - Notify MD/LIP if interventions unsuccessful or patient reports new pain  - Anticipate increased pain with activity and pre-medicate as appropriate  Outcome: Progressing     Problem: RISK FOR INFECTION - ADULT  Goal: Absence of fever/infection during anticipated neutropenic period  Description: INTERVENTIONS  - Monitor WBC  - Administer growth factors as ordered  - Implement neutropenic guidelines  Outcome: Progressing     Problem: GASTROINTESTINAL - ADULT  Goal: Maintains or returns to baseline bowel function  Description: INTERVENTIONS:  - Assess bowel  function  - Maintain adequate hydration with IV or PO as ordered and tolerated  - Evaluate effectiveness of GI medications  - Encourage mobilization and activity  - Obtain nutritional consult as needed  - Establish a toileting routine/schedule  - Consider collaborating with pharmacy to review patient's medication profile  Outcome: Not Progressing

## 2025-03-07 NOTE — DISCHARGE INSTRUCTIONS
You have a small artifact on CT of the head, follow up with neurology regarding this, it might be an aneurysm or just an artificat,    Make sure you take your blood pressure medications regularly

## 2025-03-08 ENCOUNTER — APPOINTMENT (OUTPATIENT)
Dept: CT IMAGING | Age: 45
End: 2025-03-08
Attending: EMERGENCY MEDICINE
Payer: MEDICARE

## 2025-03-08 ENCOUNTER — HOSPITAL ENCOUNTER (INPATIENT)
Facility: HOSPITAL | Age: 45
LOS: 1 days | Discharge: HOME | End: 2025-03-10
Attending: EMERGENCY MEDICINE | Admitting: INTERNAL MEDICINE
Payer: MEDICARE

## 2025-03-08 DIAGNOSIS — D63.1 ANEMIA DUE TO CHRONIC KIDNEY DISEASE, ON CHRONIC DIALYSIS (HCC): ICD-10-CM

## 2025-03-08 DIAGNOSIS — N18.5 CHRONIC RENAL FAILURE (CRF), STAGE 5 (HCC): ICD-10-CM

## 2025-03-08 DIAGNOSIS — Z99.2 ANEMIA DUE TO CHRONIC KIDNEY DISEASE, ON CHRONIC DIALYSIS (HCC): ICD-10-CM

## 2025-03-08 DIAGNOSIS — N18.6 ANEMIA DUE TO CHRONIC KIDNEY DISEASE, ON CHRONIC DIALYSIS (HCC): ICD-10-CM

## 2025-03-08 DIAGNOSIS — K62.5 RECTAL BLEEDING: Primary | ICD-10-CM

## 2025-03-08 DIAGNOSIS — K57.31 DIVERTICULOSIS OF COLON WITH HEMORRHAGE: ICD-10-CM

## 2025-03-08 LAB
ALBUMIN SERPL-MCNC: 3.9 G/DL (ref 3.2–4.8)
ALBUMIN/GLOB SERPL: 1.4 {RATIO} (ref 1–2)
ALP LIVER SERPL-CCNC: 93 U/L
ALT SERPL-CCNC: 38 U/L
ANION GAP SERPL CALC-SCNC: 12 MMOL/L (ref 0–18)
ANTIBODY SCREEN: NEGATIVE
APTT PPP: 27.8 SECONDS (ref 23–36)
AST SERPL-CCNC: 29 U/L (ref ?–34)
BASOPHILS # BLD AUTO: 0.09 X10(3) UL (ref 0–0.2)
BASOPHILS NFR BLD AUTO: 0.9 %
BILIRUB SERPL-MCNC: 0.3 MG/DL (ref 0.3–1.2)
BUN BLD-MCNC: 51 MG/DL (ref 9–23)
CALCIUM BLD-MCNC: 8.8 MG/DL (ref 8.7–10.6)
CHLORIDE SERPL-SCNC: 106 MMOL/L (ref 98–112)
CO2 SERPL-SCNC: 24 MMOL/L (ref 21–32)
CREAT BLD-MCNC: 7.66 MG/DL
EGFRCR SERPLBLD CKD-EPI 2021: 8 ML/MIN/1.73M2 (ref 60–?)
EOSINOPHIL # BLD AUTO: 0.34 X10(3) UL (ref 0–0.7)
EOSINOPHIL NFR BLD AUTO: 3.5 %
ERYTHROCYTE [DISTWIDTH] IN BLOOD BY AUTOMATED COUNT: 15.2 %
GLOBULIN PLAS-MCNC: 2.8 G/DL (ref 2–3.5)
GLUCOSE BLD-MCNC: 97 MG/DL (ref 70–99)
HCT VFR BLD AUTO: 24.1 %
HGB BLD-MCNC: 8.1 G/DL
IMM GRANULOCYTES # BLD AUTO: 0.06 X10(3) UL (ref 0–1)
IMM GRANULOCYTES NFR BLD: 0.6 %
INR BLD: 1.07 (ref 0.8–1.2)
LYMPHOCYTES # BLD AUTO: 1.57 X10(3) UL (ref 1–4)
LYMPHOCYTES NFR BLD AUTO: 16 %
MCH RBC QN AUTO: 32.7 PG (ref 26–34)
MCHC RBC AUTO-ENTMCNC: 33.6 G/DL (ref 31–37)
MCV RBC AUTO: 97.2 FL
MONOCYTES # BLD AUTO: 1.17 X10(3) UL (ref 0.1–1)
MONOCYTES NFR BLD AUTO: 11.9 %
NEUTROPHILS # BLD AUTO: 6.59 X10 (3) UL (ref 1.5–7.7)
NEUTROPHILS # BLD AUTO: 6.59 X10(3) UL (ref 1.5–7.7)
NEUTROPHILS NFR BLD AUTO: 67.1 %
OSMOLALITY SERPL CALC.SUM OF ELEC: 308 MOSM/KG (ref 275–295)
PLATELET # BLD AUTO: 217 10(3)UL (ref 150–450)
POTASSIUM SERPL-SCNC: 4.1 MMOL/L (ref 3.5–5.1)
PROT SERPL-MCNC: 6.7 G/DL (ref 5.7–8.2)
PROTHROMBIN TIME: 13.7 SECONDS (ref 11.6–14.8)
RBC # BLD AUTO: 2.48 X10(6)UL
RH BLOOD TYPE: POSITIVE
SODIUM SERPL-SCNC: 142 MMOL/L (ref 136–145)
WBC # BLD AUTO: 9.8 X10(3) UL (ref 4–11)

## 2025-03-08 PROCEDURE — 86901 BLOOD TYPING SEROLOGIC RH(D): CPT | Performed by: EMERGENCY MEDICINE

## 2025-03-08 PROCEDURE — 85610 PROTHROMBIN TIME: CPT | Performed by: EMERGENCY MEDICINE

## 2025-03-08 PROCEDURE — 85025 COMPLETE CBC W/AUTO DIFF WBC: CPT | Performed by: EMERGENCY MEDICINE

## 2025-03-08 PROCEDURE — 86850 RBC ANTIBODY SCREEN: CPT | Performed by: EMERGENCY MEDICINE

## 2025-03-08 PROCEDURE — 85730 THROMBOPLASTIN TIME PARTIAL: CPT | Performed by: EMERGENCY MEDICINE

## 2025-03-08 PROCEDURE — 96376 TX/PRO/DX INJ SAME DRUG ADON: CPT

## 2025-03-08 PROCEDURE — 99285 EMERGENCY DEPT VISIT HI MDM: CPT

## 2025-03-08 PROCEDURE — 80053 COMPREHEN METABOLIC PANEL: CPT | Performed by: EMERGENCY MEDICINE

## 2025-03-08 PROCEDURE — 86900 BLOOD TYPING SEROLOGIC ABO: CPT | Performed by: EMERGENCY MEDICINE

## 2025-03-08 PROCEDURE — 96374 THER/PROPH/DIAG INJ IV PUSH: CPT

## 2025-03-08 PROCEDURE — 74177 CT ABD & PELVIS W/CONTRAST: CPT | Performed by: EMERGENCY MEDICINE

## 2025-03-08 RX ORDER — MORPHINE SULFATE 4 MG/ML
4 INJECTION, SOLUTION INTRAMUSCULAR; INTRAVENOUS ONCE
Status: COMPLETED | OUTPATIENT
Start: 2025-03-08 | End: 2025-03-08

## 2025-03-08 RX ORDER — HYDROCODONE BITARTRATE AND ACETAMINOPHEN 10; 325 MG/1; MG/1
1 TABLET ORAL EVERY 6 HOURS PRN
COMMUNITY

## 2025-03-08 NOTE — PLAN OF CARE
Patient alert and oriented. States headaches controlled, denies any blurred vision. Patient tolerates diet, no diarrhea during shift, ambulates freely in room. HD at bedside this evening, 2L removed. Patient cleared from all services to DC after HD. AVS reviewed at length w patient, patient verbalizes understanding and states that all questions have been answered. IV removed. Patient discharged at approx 1830 accompanied by writer to [place] and was picked up by [employer] , all belongings with patient. Patient updated progressing an in agreement with POC and DC plan. Patient adequate for DC.

## 2025-03-09 ENCOUNTER — ANESTHESIA EVENT (OUTPATIENT)
Dept: ENDOSCOPY | Facility: HOSPITAL | Age: 45
End: 2025-03-09
Payer: MEDICARE

## 2025-03-09 ENCOUNTER — ANESTHESIA (OUTPATIENT)
Dept: ENDOSCOPY | Facility: HOSPITAL | Age: 45
End: 2025-03-09
Payer: MEDICARE

## 2025-03-09 PROBLEM — N18.5: Status: ACTIVE | Noted: 2025-03-09

## 2025-03-09 PROBLEM — K57.31 DIVERTICULOSIS OF COLON WITH HEMORRHAGE: Status: ACTIVE | Noted: 2025-03-09

## 2025-03-09 PROBLEM — D63.1 ANEMIA DUE TO CHRONIC KIDNEY DISEASE, ON CHRONIC DIALYSIS (HCC): Status: ACTIVE | Noted: 2025-03-09

## 2025-03-09 PROBLEM — Z99.2 ANEMIA DUE TO CHRONIC KIDNEY DISEASE, ON CHRONIC DIALYSIS (HCC): Status: ACTIVE | Noted: 2025-03-09

## 2025-03-09 PROBLEM — N18.6 ANEMIA DUE TO CHRONIC KIDNEY DISEASE, ON CHRONIC DIALYSIS (HCC): Status: ACTIVE | Noted: 2025-03-09

## 2025-03-09 LAB
ALBUMIN SERPL-MCNC: 4 G/DL (ref 3.2–4.8)
ALBUMIN/GLOB SERPL: 1.5 {RATIO} (ref 1–2)
ALP LIVER SERPL-CCNC: 92 U/L
ALT SERPL-CCNC: 32 U/L
ANION GAP SERPL CALC-SCNC: 13 MMOL/L (ref 0–18)
AST SERPL-CCNC: 30 U/L (ref ?–34)
BASOPHILS # BLD AUTO: 0.08 X10(3) UL (ref 0–0.2)
BASOPHILS NFR BLD AUTO: 1 %
BILIRUB SERPL-MCNC: 0.4 MG/DL (ref 0.3–1.2)
BUN BLD-MCNC: 56 MG/DL (ref 9–23)
CALCIUM BLD-MCNC: 8.9 MG/DL (ref 8.7–10.6)
CHLORIDE SERPL-SCNC: 104 MMOL/L (ref 98–112)
CO2 SERPL-SCNC: 26 MMOL/L (ref 21–32)
CREAT BLD-MCNC: 7.81 MG/DL
EGFRCR SERPLBLD CKD-EPI 2021: 8 ML/MIN/1.73M2 (ref 60–?)
EOSINOPHIL # BLD AUTO: 0.39 X10(3) UL (ref 0–0.7)
EOSINOPHIL NFR BLD AUTO: 4.7 %
ERYTHROCYTE [DISTWIDTH] IN BLOOD BY AUTOMATED COUNT: 15.1 %
GLOBULIN PLAS-MCNC: 2.6 G/DL (ref 2–3.5)
GLUCOSE BLD-MCNC: 89 MG/DL (ref 70–99)
HCT VFR BLD AUTO: 24.4 %
HGB BLD-MCNC: 8 G/DL
IMM GRANULOCYTES # BLD AUTO: 0.04 X10(3) UL (ref 0–1)
IMM GRANULOCYTES NFR BLD: 0.5 %
LYMPHOCYTES # BLD AUTO: 1.71 X10(3) UL (ref 1–4)
LYMPHOCYTES NFR BLD AUTO: 20.5 %
MAGNESIUM SERPL-MCNC: 2 MG/DL (ref 1.6–2.6)
MCH RBC QN AUTO: 31.9 PG (ref 26–34)
MCHC RBC AUTO-ENTMCNC: 32.8 G/DL (ref 31–37)
MCV RBC AUTO: 97.2 FL
MONOCYTES # BLD AUTO: 0.86 X10(3) UL (ref 0.1–1)
MONOCYTES NFR BLD AUTO: 10.3 %
NEUTROPHILS # BLD AUTO: 5.28 X10 (3) UL (ref 1.5–7.7)
NEUTROPHILS # BLD AUTO: 5.28 X10(3) UL (ref 1.5–7.7)
NEUTROPHILS NFR BLD AUTO: 63 %
OSMOLALITY SERPL CALC.SUM OF ELEC: 311 MOSM/KG (ref 275–295)
PLATELET # BLD AUTO: 210 10(3)UL (ref 150–450)
POTASSIUM SERPL-SCNC: 4.2 MMOL/L (ref 3.5–5.1)
PROT SERPL-MCNC: 6.6 G/DL (ref 5.7–8.2)
RBC # BLD AUTO: 2.51 X10(6)UL
SODIUM SERPL-SCNC: 143 MMOL/L (ref 136–145)
WBC # BLD AUTO: 8.4 X10(3) UL (ref 4–11)

## 2025-03-09 PROCEDURE — 88305 TISSUE EXAM BY PATHOLOGIST: CPT | Performed by: INTERNAL MEDICINE

## 2025-03-09 PROCEDURE — 83735 ASSAY OF MAGNESIUM: CPT | Performed by: HOSPITALIST

## 2025-03-09 PROCEDURE — 0DBL8ZZ EXCISION OF TRANSVERSE COLON, VIA NATURAL OR ARTIFICIAL OPENING ENDOSCOPIC: ICD-10-PCS | Performed by: INTERNAL MEDICINE

## 2025-03-09 PROCEDURE — 0DBK8ZZ EXCISION OF ASCENDING COLON, VIA NATURAL OR ARTIFICIAL OPENING ENDOSCOPIC: ICD-10-PCS | Performed by: INTERNAL MEDICINE

## 2025-03-09 PROCEDURE — 85025 COMPLETE CBC W/AUTO DIFF WBC: CPT | Performed by: HOSPITALIST

## 2025-03-09 PROCEDURE — 94760 N-INVAS EAR/PLS OXIMETRY 1: CPT

## 2025-03-09 PROCEDURE — 80053 COMPREHEN METABOLIC PANEL: CPT | Performed by: HOSPITALIST

## 2025-03-09 DEVICE — REPLAY HEMOSTASIS CLIP, 11MM SPAN
Type: IMPLANTABLE DEVICE | Site: COLON | Status: FUNCTIONAL
Brand: REPLAY

## 2025-03-09 RX ORDER — GABAPENTIN 100 MG/1
200 CAPSULE ORAL 3 TIMES DAILY
Status: DISCONTINUED | OUTPATIENT
Start: 2025-03-09 | End: 2025-03-10

## 2025-03-09 RX ORDER — FLUTICASONE PROPIONATE 50 MCG
1 SPRAY, SUSPENSION (ML) NASAL 2 TIMES DAILY
Status: DISCONTINUED | OUTPATIENT
Start: 2025-03-09 | End: 2025-03-10

## 2025-03-09 RX ORDER — HYDRALAZINE HYDROCHLORIDE 20 MG/ML
5 INJECTION INTRAMUSCULAR; INTRAVENOUS EVERY 6 HOURS PRN
Status: DISCONTINUED | OUTPATIENT
Start: 2025-03-09 | End: 2025-03-10

## 2025-03-09 RX ORDER — LABETALOL HYDROCHLORIDE 5 MG/ML
5 INJECTION, SOLUTION INTRAVENOUS EVERY 5 MIN PRN
Status: CANCELLED | OUTPATIENT
Start: 2025-03-09 | End: 2025-03-09

## 2025-03-09 RX ORDER — PROCHLORPERAZINE EDISYLATE 5 MG/ML
5 INJECTION INTRAMUSCULAR; INTRAVENOUS EVERY 8 HOURS PRN
Status: CANCELLED | OUTPATIENT
Start: 2025-03-09

## 2025-03-09 RX ORDER — HYDROCORTISONE ACETATE 25 MG/1
25 SUPPOSITORY RECTAL 2 TIMES DAILY
Status: DISCONTINUED | OUTPATIENT
Start: 2025-03-09 | End: 2025-03-10

## 2025-03-09 RX ORDER — LIDOCAINE HYDROCHLORIDE 10 MG/ML
INJECTION, SOLUTION EPIDURAL; INFILTRATION; INTRACAUDAL; PERINEURAL AS NEEDED
Status: DISCONTINUED | OUTPATIENT
Start: 2025-03-09 | End: 2025-03-09 | Stop reason: SURG

## 2025-03-09 RX ORDER — METOPROLOL TARTRATE 1 MG/ML
2.5 INJECTION, SOLUTION INTRAVENOUS ONCE
Status: CANCELLED | OUTPATIENT
Start: 2025-03-09 | End: 2025-03-09

## 2025-03-09 RX ORDER — NICOTINE POLACRILEX 4 MG
30 LOZENGE BUCCAL
Status: CANCELLED | OUTPATIENT
Start: 2025-03-09

## 2025-03-09 RX ORDER — SODIUM CHLORIDE, SODIUM LACTATE, POTASSIUM CHLORIDE, CALCIUM CHLORIDE 600; 310; 30; 20 MG/100ML; MG/100ML; MG/100ML; MG/100ML
INJECTION, SOLUTION INTRAVENOUS CONTINUOUS
Status: CANCELLED | OUTPATIENT
Start: 2025-03-09

## 2025-03-09 RX ORDER — CLONIDINE HYDROCHLORIDE 0.1 MG/1
0.1 TABLET ORAL 2 TIMES DAILY
Status: DISCONTINUED | OUTPATIENT
Start: 2025-03-09 | End: 2025-03-10

## 2025-03-09 RX ORDER — ONDANSETRON 2 MG/ML
4 INJECTION INTRAMUSCULAR; INTRAVENOUS EVERY 6 HOURS PRN
Status: DISCONTINUED | OUTPATIENT
Start: 2025-03-09 | End: 2025-03-10

## 2025-03-09 RX ORDER — HYDROMORPHONE HYDROCHLORIDE 1 MG/ML
0.6 INJECTION, SOLUTION INTRAMUSCULAR; INTRAVENOUS; SUBCUTANEOUS EVERY 5 MIN PRN
Status: CANCELLED | OUTPATIENT
Start: 2025-03-09 | End: 2025-03-09

## 2025-03-09 RX ORDER — SODIUM CHLORIDE 9 MG/ML
INJECTION, SOLUTION INTRAVENOUS CONTINUOUS PRN
Status: DISCONTINUED | OUTPATIENT
Start: 2025-03-09 | End: 2025-03-09 | Stop reason: SURG

## 2025-03-09 RX ORDER — MORPHINE SULFATE 4 MG/ML
4 INJECTION, SOLUTION INTRAMUSCULAR; INTRAVENOUS ONCE
Status: COMPLETED | OUTPATIENT
Start: 2025-03-09 | End: 2025-03-09

## 2025-03-09 RX ORDER — ERGOCALCIFEROL 1.25 MG/1
50000 CAPSULE, LIQUID FILLED ORAL WEEKLY
Status: DISCONTINUED | OUTPATIENT
Start: 2025-03-09 | End: 2025-03-10

## 2025-03-09 RX ORDER — ARIPIPRAZOLE 5 MG/1
5 TABLET ORAL DAILY
Status: DISCONTINUED | OUTPATIENT
Start: 2025-03-09 | End: 2025-03-10

## 2025-03-09 RX ORDER — DEXTROAMPHETAMINE SACCHARATE, AMPHETAMINE ASPARTATE, DEXTROAMPHETAMINE SULFATE AND AMPHETAMINE SULFATE 2.5; 2.5; 2.5; 2.5 MG/1; MG/1; MG/1; MG/1
10 TABLET ORAL
Status: DISCONTINUED | OUTPATIENT
Start: 2025-03-09 | End: 2025-03-10

## 2025-03-09 RX ORDER — DEXTROSE MONOHYDRATE 25 G/50ML
50 INJECTION, SOLUTION INTRAVENOUS
Status: CANCELLED | OUTPATIENT
Start: 2025-03-09

## 2025-03-09 RX ORDER — CALCIUM ACETATE 667 MG/1
667 CAPSULE ORAL
Status: DISCONTINUED | OUTPATIENT
Start: 2025-03-09 | End: 2025-03-10

## 2025-03-09 RX ORDER — HYDROMORPHONE HYDROCHLORIDE 1 MG/ML
0.4 INJECTION, SOLUTION INTRAMUSCULAR; INTRAVENOUS; SUBCUTANEOUS EVERY 5 MIN PRN
Status: CANCELLED | OUTPATIENT
Start: 2025-03-09 | End: 2025-03-09

## 2025-03-09 RX ORDER — HYDROCODONE BITARTRATE AND ACETAMINOPHEN 5; 325 MG/1; MG/1
2 TABLET ORAL ONCE AS NEEDED
Status: CANCELLED | OUTPATIENT
Start: 2025-03-09 | End: 2025-03-09

## 2025-03-09 RX ORDER — PROCHLORPERAZINE EDISYLATE 5 MG/ML
5 INJECTION INTRAMUSCULAR; INTRAVENOUS EVERY 8 HOURS PRN
Status: DISCONTINUED | OUTPATIENT
Start: 2025-03-09 | End: 2025-03-10

## 2025-03-09 RX ORDER — SEVELAMER CARBONATE 800 MG/1
2400 TABLET, FILM COATED ORAL
Status: DISCONTINUED | OUTPATIENT
Start: 2025-03-09 | End: 2025-03-10

## 2025-03-09 RX ORDER — CARVEDILOL 12.5 MG/1
25 TABLET ORAL 2 TIMES DAILY WITH MEALS
Status: DISCONTINUED | OUTPATIENT
Start: 2025-03-09 | End: 2025-03-10

## 2025-03-09 RX ORDER — HYDROCODONE BITARTRATE AND ACETAMINOPHEN 10; 325 MG/1; MG/1
1 TABLET ORAL EVERY 6 HOURS PRN
Status: DISCONTINUED | OUTPATIENT
Start: 2025-03-09 | End: 2025-03-10

## 2025-03-09 RX ORDER — HYDROMORPHONE HYDROCHLORIDE 1 MG/ML
0.2 INJECTION, SOLUTION INTRAMUSCULAR; INTRAVENOUS; SUBCUTANEOUS EVERY 4 HOURS PRN
Status: DISCONTINUED | OUTPATIENT
Start: 2025-03-09 | End: 2025-03-10

## 2025-03-09 RX ORDER — NALOXONE HYDROCHLORIDE 0.4 MG/ML
80 INJECTION, SOLUTION INTRAMUSCULAR; INTRAVENOUS; SUBCUTANEOUS AS NEEDED
Status: CANCELLED | OUTPATIENT
Start: 2025-03-09 | End: 2025-03-09

## 2025-03-09 RX ORDER — LAMOTRIGINE 100 MG/1
100 TABLET ORAL DAILY
Status: DISCONTINUED | OUTPATIENT
Start: 2025-03-09 | End: 2025-03-10

## 2025-03-09 RX ORDER — HYDROMORPHONE HYDROCHLORIDE 1 MG/ML
0.2 INJECTION, SOLUTION INTRAMUSCULAR; INTRAVENOUS; SUBCUTANEOUS EVERY 5 MIN PRN
Status: CANCELLED | OUTPATIENT
Start: 2025-03-09 | End: 2025-03-09

## 2025-03-09 RX ORDER — SODIUM CHLORIDE 9 MG/ML
INJECTION, SOLUTION INTRAVENOUS CONTINUOUS
Status: CANCELLED | OUTPATIENT
Start: 2025-03-09

## 2025-03-09 RX ORDER — ARIPIPRAZOLE 10 MG/1
10 TABLET ORAL DAILY
Status: DISCONTINUED | OUTPATIENT
Start: 2025-03-09 | End: 2025-03-10

## 2025-03-09 RX ORDER — ONDANSETRON 4 MG/1
4 TABLET, ORALLY DISINTEGRATING ORAL EVERY 8 HOURS PRN
Status: DISCONTINUED | OUTPATIENT
Start: 2025-03-09 | End: 2025-03-10

## 2025-03-09 RX ORDER — ALBUTEROL SULFATE 90 UG/1
2 INHALANT RESPIRATORY (INHALATION) EVERY 6 HOURS PRN
Status: DISCONTINUED | OUTPATIENT
Start: 2025-03-09 | End: 2025-03-10

## 2025-03-09 RX ORDER — HYDRALAZINE HYDROCHLORIDE 25 MG/1
25 TABLET, FILM COATED ORAL 3 TIMES DAILY
Status: DISCONTINUED | OUTPATIENT
Start: 2025-03-09 | End: 2025-03-10

## 2025-03-09 RX ORDER — ONDANSETRON 2 MG/ML
4 INJECTION INTRAMUSCULAR; INTRAVENOUS EVERY 6 HOURS PRN
Status: CANCELLED | OUTPATIENT
Start: 2025-03-09

## 2025-03-09 RX ORDER — LOSARTAN POTASSIUM 100 MG/1
100 TABLET ORAL DAILY
Status: DISCONTINUED | OUTPATIENT
Start: 2025-03-09 | End: 2025-03-10

## 2025-03-09 RX ORDER — NIFEDIPINE 30 MG/1
30 TABLET, EXTENDED RELEASE ORAL DAILY
Status: DISCONTINUED | OUTPATIENT
Start: 2025-03-09 | End: 2025-03-10

## 2025-03-09 RX ORDER — SODIUM CHLORIDE, SODIUM LACTATE, POTASSIUM CHLORIDE, CALCIUM CHLORIDE 600; 310; 30; 20 MG/100ML; MG/100ML; MG/100ML; MG/100ML
INJECTION, SOLUTION INTRAVENOUS CONTINUOUS
Status: DISCONTINUED | OUTPATIENT
Start: 2025-03-09 | End: 2025-03-09

## 2025-03-09 RX ORDER — NICOTINE POLACRILEX 4 MG
15 LOZENGE BUCCAL
Status: CANCELLED | OUTPATIENT
Start: 2025-03-09

## 2025-03-09 RX ORDER — HYDROCODONE BITARTRATE AND ACETAMINOPHEN 5; 325 MG/1; MG/1
1 TABLET ORAL ONCE AS NEEDED
Status: CANCELLED | OUTPATIENT
Start: 2025-03-09 | End: 2025-03-09

## 2025-03-09 RX ORDER — MEPERIDINE HYDROCHLORIDE 25 MG/ML
12.5 INJECTION INTRAMUSCULAR; INTRAVENOUS; SUBCUTANEOUS AS NEEDED
Status: CANCELLED | OUTPATIENT
Start: 2025-03-09

## 2025-03-09 RX ORDER — ACETAMINOPHEN 500 MG
500 TABLET ORAL EVERY 4 HOURS PRN
Status: DISCONTINUED | OUTPATIENT
Start: 2025-03-09 | End: 2025-03-10

## 2025-03-09 RX ORDER — ALBUMIN (HUMAN) 12.5 G/50ML
25 SOLUTION INTRAVENOUS
Status: DISCONTINUED | OUTPATIENT
Start: 2025-03-09 | End: 2025-03-10

## 2025-03-09 RX ORDER — NALOXONE HYDROCHLORIDE 0.4 MG/ML
80 INJECTION, SOLUTION INTRAMUSCULAR; INTRAVENOUS; SUBCUTANEOUS AS NEEDED
Status: ACTIVE | OUTPATIENT
Start: 2025-03-09 | End: 2025-03-10

## 2025-03-09 RX ORDER — BUPROPION HYDROCHLORIDE 150 MG/1
150 TABLET ORAL DAILY
Status: DISCONTINUED | OUTPATIENT
Start: 2025-03-09 | End: 2025-03-10

## 2025-03-09 RX ORDER — MIDAZOLAM HYDROCHLORIDE 1 MG/ML
1 INJECTION INTRAMUSCULAR; INTRAVENOUS EVERY 5 MIN PRN
Status: CANCELLED | OUTPATIENT
Start: 2025-03-09 | End: 2025-03-09

## 2025-03-09 RX ORDER — TAMSULOSIN HYDROCHLORIDE 0.4 MG/1
0.4 CAPSULE ORAL DAILY
Status: DISCONTINUED | OUTPATIENT
Start: 2025-03-09 | End: 2025-03-10

## 2025-03-09 RX ORDER — ACETAMINOPHEN 500 MG
1000 TABLET ORAL ONCE AS NEEDED
Status: CANCELLED | OUTPATIENT
Start: 2025-03-09 | End: 2025-03-09

## 2025-03-09 RX ADMIN — SODIUM CHLORIDE: 9 INJECTION, SOLUTION INTRAVENOUS at 12:51:00

## 2025-03-09 RX ADMIN — SODIUM CHLORIDE: 9 INJECTION, SOLUTION INTRAVENOUS at 12:09:00

## 2025-03-09 RX ADMIN — LIDOCAINE HYDROCHLORIDE 50 MG: 10 INJECTION, SOLUTION EPIDURAL; INFILTRATION; INTRACAUDAL; PERINEURAL at 12:09:00

## 2025-03-09 NOTE — CONSULTS
[facility]   part of [facility]    Report of Consultation    Gdowin Fonseca Patient Status:  Inpatient    1980 MRN [id number]   Location [facility] 4NW-A Attending Miguel Garsia, DO   Hosp Day # 0 PCP Adrian Small MD       REASON FOR CONSULT:     ESRD    HISTORY OF PRESENT ILLNESS:     43 yo M with history of ESRD on iHD MWF via LUE AVF, HTN, severe MR s/p MVR x 2, HFpEF, DVT/PE, TIA, MGUS, bipolar disorder, recurrent GI bleed, diffuse body pains presented with rectal bleeding. He was discharged from here after dialysis on 3/7 after being admitted  for nausea/vomiting. He has not been eating much the past 2 days. Underwent colonoscopy today.    REVIEW OF SYSTEMS:     Please see HPI for pertinent positives. 10 point review of systems otherwise reviewed and negative.     HISTORY:     Past Medical History:    Anxiety state    Arrhythmia    Asthma (HCC)    Attention deficit hyperactivity disorder (ADHD)    Back problem    Bipolar 1 disorder (HCC)    CKD (chronic kidney disease) stage 3, GFR 30-59 ml/min ([facility])    Dr Meeks    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[facility])    Congestive heart disease (HCC)    COPD (chronic obstructive pulmonary disease) ([facility])    Coronary atherosclerosis    Deep vein thrombosis ([facility])    at age 17 R/T cast    Depression    Diabetes ([facility])    Dialysis patient    Diverticulosis of large intestine    Essential hypertension    3/21 echo: severe concentric LVH with normal EF and no MR or pHTN    Extrinsic asthma, unspecified    Heart attack (HCC)    - angiogram- no intervention    Heart valve disease    mitral valve repair in /    High blood pressure    High cholesterol    History of blood transfusion    History of mitral valve repair    Hyperlipidemia    Low back pain    tight and stiff after sweeping and mopping    LVH (left ventricular hypertrophy)    Migraines    Mixed hyperlipidemia     HDL 38 LDL 97 VLDL 57     Monoclonal gammopathy    IgG kappa     Muscle  weakness    MVP (mitral valve prolapse)    Repair 1994 at [place]; echoes as recently as 3/21 show mild or trivial MR and no stenosis    Neuropathy    Osteoarthritis    hip ,knees    Pneumonia due to organism    Pulmonary embolism (HCC)    Renal disorder    Stroke (HCC)    TIA (transient ischemic attack)    Initial history of left-sided weakness and slurred speech. (+) cocaine. MRI of the brain, CT angiogram of the head and neck, and 2D echo are all unremarkable.     TMJ (dislocation of temporomandibular joint)    Troponin level elevated    Trop 60 60 47 with TIA and no CP: Lexiscan negative with EF 51    Visual impairment     Past Surgical History:   Procedure Laterality Date    Av fistula revision, open Left     Cabg      Colonoscopy N/A 03/26/2023    Procedure: COLONOSCOPY;  Surgeon: Heath Vu MD;  Location:  ENDOSCOPY    Colonoscopy N/A 12/30/2023    Procedure: COLONOSCOPY with cold snare polypectomy and forcep polypectomy;  Surgeon: Ousmane Suarez MD;  Location:  ENDOSCOPY    Colonoscopy & polypectomy  2019    Egd  2019    Duodenitis. Biopsied. EUS for weight loss was negative    Heart surgery      Hernia surgery  08/17/2022    Dr Barnes    Laminectomy,>2 sgmt,lumbar  09/06/2018    L4-L5 Decomp Discectomy ROEM L4-L5    Mitralplasty w cp bypass  1994    [place]: Repair    Repair rotator cuff,chronic Left     torn and had a ruptured bicep    Sinus surgery        Spine surgery procedure unlisted      Valve repair  1994    mitral valve     Family History   Problem Relation Age of Onset    Hypertension Father     Alcohol and Other Disorders Associated Father     Substance Abuse Father         cocaine    Dementia Father     Cancer Father         lung    Diabetes Mother     Cancer Mother         multiple myeloma    Hypertension Mother     Anxiety Maternal Aunt     Depression Maternal Aunt     Anxiety Maternal Aunt     Depression Maternal Aunt     Bipolar Disorder Maternal Aunt     Diabetes Maternal  Grandmother     Hypertension Maternal Grandmother     Cancer Maternal Grandfather         stomach cancer    Diabetes Maternal Grandfather     Hypertension Maternal Grandfather     Alcohol and Other Disorders Associated Maternal Grandfather     Hypertension Paternal Grandmother     Hypertension Paternal Grandfather     Cancer Sister         uterine and ovarian    Hypertension Sister     Cancer Maternal Uncle         lung    Cancer Paternal Aunt         throat      reports that he quit smoking about 3 years ago. His smoking use included cigarettes. He started smoking about 30 years ago. He has a 27 pack-year smoking history. He has never been exposed to tobacco smoke. He has never used smokeless tobacco. He reports that he does not drink alcohol and does not use drugs.    ALLERGIES:     Allergies[1]    MEDICATIONS:       Current Facility-Administered Medications:     albuterol (Ventolin HFA) 108 (90 Base) MCG/ACT inhaler 2 puff, 2 puff, Inhalation, Q6H PRN    ARIPiprazole (Abilify) tab 10 mg, 10 mg, Oral, Daily    ARIPiprazole (Abilify) tab 5 mg, 5 mg, Oral, Daily    buPROPion ER (Wellbutrin XL) 24 hr tab 150 mg, 150 mg, Oral, Daily    calcium acetate (Phoslo) cap 667 mg, 667 mg, Oral, TID with meals    carvedilol (Coreg) tab 25 mg, 25 mg, Oral, BID with meals    cloNIDine (Catapres) tab 0.1 mg, 0.1 mg, Oral, BID    ergocalciferol (Vitamin D2) cap 50,000 Units, 50,000 Units, Oral, Weekly    FLUoxetine (PROzac) cap 40 mg, 40 mg, Oral, Daily    fluticasone propionate (Flonase) 50 MCG/ACT nasal suspension 1 spray, 1 spray, Each Nare, BID    gabapentin (Neurontin) cap 200 mg, 200 mg, Oral, TID    hydrALAZINE (Apresoline) tab 25 mg, 25 mg, Oral, TID    HYDROcodone-acetaminophen (Norco)  MG per tab 1 tablet, 1 tablet, Oral, Q6H PRN    lamoTRIgine (LaMICtal) tab 100 mg, 100 mg, Oral, Daily    losartan (Cozaar) tab 100 mg, 100 mg, Oral, Daily    NIFEdipine ER (Procardia-XL) 24 hr tab 30 mg, 30 mg, Oral, Daily     ondansetron (Zofran-ODT) disintegrating tab 4 mg, 4 mg, Oral, Q8H PRN    sevelamer carbonate (Renvela) tab 2,400 mg, 2,400 mg, Oral, TID CC    tamsulosin (Flomax) cap 0.4 mg, 0.4 mg, Oral, Daily    amphetamine-dextroamphetamine (Adderall) tab 10 mg, 10 mg, Oral, BID@0800,1200    acetaminophen (Tylenol Extra Strength) tab 500 mg, 500 mg, Oral, Q4H PRN    ondansetron (Zofran) 4 MG/2ML injection 4 mg, 4 mg, Intravenous, Q6H PRN    prochlorperazine (Compazine) 10 MG/2ML injection 5 mg, 5 mg, Intravenous, Q8H PRN    hydrocortisone (Anusol-HC) 25 MG rectal suppository 25 mg, 25 mg, Rectal, BID  No current outpatient medications on file.         PHYSICAL EXAM:     Vital Signs: BP (!) 165/98 (BP Location: Right arm)   Pulse 83   Temp 97.4 °F (36.3 °C) (Oral)   Resp 20   Ht 188 cm (6' 2\")   Wt 240 lb (108.9 kg)   SpO2 99%   BMI 30.81 kg/m²   Temp (24hrs), Av.7 °F (36.5 °C), Min:97.3 °F (36.3 °C), Max:98.3 °F (36.8 °C)       Intake/Output Summary (Last 24 hours) at 3/9/2025 1612  Last data filed at 3/9/2025 1251  Gross per 24 hour   Intake 400 ml   Output 0 ml   Net 400 ml     Wt Readings from Last 3 Encounters:   25 240 lb (108.9 kg)   25 225 lb (102.1 kg)   25 246 lb (111.6 kg)       General: pleasant, well appearing  Skin: no visible rashes  HEENT: NCAT  Cardiac: Regular rate and rhythm, no murmur/gallop or rub  Lungs: CTAB, no wheeze, no rale, no rhonchi  Abdomen: Soft, NTND  Extremities: warm, well perfused, no leg edema  Neurologic/Psych: mentating well, no asterixis  LUE AVF + thrill and bruit    LABORATORY DATA:       Lab Results   Component Value Date    GLU 89 2025    BUN 56 (H) 2025    BUNCREA 13.0 2022    CREATSERUM 7.81 (H) 2025    ANIONGAP 13 2025    GFR 59 (L) 2018    GFRNAA 30 (L) 2022    GFRAA 35 (L) 2022    CA 8.9 2025    OSMOCALC 311 (H) 2025    ALKPHO 92 2025    AST 30 2025    ALT 32 2025    BILT 0.4  03/09/2025    TP 6.6 03/09/2025    ALB 4.0 03/09/2025    GLOBULIN 2.6 03/09/2025     03/09/2025    K 4.2 03/09/2025     03/09/2025    CO2 26.0 03/09/2025     Lab Results   Component Value Date    WBC 8.4 03/09/2025    RBC 2.51 (L) 03/09/2025    HGB 8.0 (L) 03/09/2025    HCT 24.4 (L) 03/09/2025    .0 03/09/2025    MPV 11.5 12/14/2012    MCV 97.2 03/09/2025    MCH 31.9 03/09/2025    MCHC 32.8 03/09/2025    RDW 15.1 03/09/2025    NEPRELIM 5.28 03/09/2025    NEPERCENT 63.0 03/09/2025    LYPERCENT 20.5 03/09/2025    MOPERCENT 10.3 03/09/2025    EOPERCENT 4.7 03/09/2025    BAPERCENT 1.0 03/09/2025    NE 5.28 03/09/2025    LYMABS 1.71 03/09/2025    MOABSO 0.86 03/09/2025    EOABSO 0.39 03/09/2025    BAABSO 0.08 03/09/2025     Lab Results   Component Value Date    MALBP 167.00 05/24/2023    CREUR 142.00 05/24/2023    CREUR 142.00 05/24/2023     Lab Results   Component Value Date    COLORUR Light-Yellow 11/17/2024    CLARITY Clear 11/17/2024    SPECGRAVITY 1.013 11/17/2024    GLUUR Normal 11/17/2024    BILUR Negative 11/17/2024    KETUR Negative 11/17/2024    BLOODURINE Negative 11/17/2024    PHURINE 8.5 (H) 11/17/2024    PROUR 100 (A) 11/17/2024    UROBILINOGEN Normal 11/17/2024    NITRITE Negative 11/17/2024    LEUUR Negative 11/17/2024    WBCUR 1-5 11/17/2024    RBCUR 0-2 11/17/2024    EPIUR Few (A) 11/17/2024    BACUR Rare (A) 11/17/2024    CAOXUR Occasional (A) 04/20/2018    HYLUR Present (A) 05/14/2019         IMAGING:     Reviewed.      ASSESSMENT/PLAN:   43 yo M with history of ESRD on iHD MWF via LUE AVF, HTN, severe MR s/p MVR x 2, HFpEF, DVT/PE, TIA, MGUS, bipolar disorder, recurrent GI bleed, diffuse body pains presented with rectal bleeding.     ESRD:  -- plan HD tomorrow     Anemia:  -- in the setting of bleeding  -- epo with HD    MBD:  -- sevelamer 2400 TID AC  -- calcium acetate 667 TIDAC  -- low phos diet    HTN:  -- coreg and losartan pre-HD  -- clonidine should be continued BID to avoid  rebound  -- takes remaining antihypertensives after HD unless SBP>180    LUE AVF:  -- L arm precautions    Rectal bleeding:  -- colonoscopy with pan-diverticulosis, hemorrhoids and polyps    D/w RN.        Thank you for allowing me to participate in the care of your patient. Please do not hesitate to contact me with concerns or questions.    Lenka Bond MD  [facility]         [1]   Allergies  Allergen Reactions    Hydrochlorothiazide RASH and HIVES    Fentanyl ITCHING and NAUSEA ONLY

## 2025-03-09 NOTE — ANESTHESIA POSTPROCEDURE EVALUATION
[facility]    Godwin Fonseca Patient Status:  Inpatient   Age/Gender 44 year old male MRN [id number]   Location [facility] 4NW-A Attending Miguel Garsia DO   Hosp Day # 0 PCP Adrian Small MD       Anesthesia Post-op Note    COLONOSCOPY WITH COLD SNARE POLYPECTOMY AND ENDO CLIPS X 2    Procedure Summary       Date: 03/09/25 Room / Location:  ENDOSCOPY 02 /  ENDOSCOPY    Anesthesia Start: 1205 Anesthesia Stop: 1301    Procedure: COLONOSCOPY WITH COLD SNARE POLYPECTOMY AND ENDO CLIPS X 2 Diagnosis:       Rectal bleeding      (POLYPS, DIVERTICULOSIS, HEMORROIDS)    Surgeons: Bakari Noguera MD Anesthesiologist: Edmond Esquivel MD    Anesthesia Type: MAC ASA Status: 3            Anesthesia Type: MAC    Vitals Value Taken Time   /105 03/09/25 1300   Temp   03/09/25 1300   Pulse 92 03/09/25 1300   Resp 16 03/09/25 1300   SpO2 97 % 03/09/25 1300   Vitals shown include unfiled device data.        Patient Location: Endoscopy    Anesthesia Type: MAC    Airway Patency: patent    Postop Pain Control: adequate    Mental Status: preanesthetic baseline    Nausea/Vomiting: none    Cardiopulmonary/Hydration status: stable euvolemic    Complications: no apparent anesthesia related complications    Postop vital signs: stable    Dental Exam: Unchanged from Preop    Patient to be discharged from PACU when criteria met.

## 2025-03-09 NOTE — CONSULTS
[facility]  Report of Consultation    Godwin Fonseca Patient Status:  Inpatient    1980 MRN [id number]   Location [facility] 4NW-A Attending Yuriy Forrest MD   Hosp Day # 0 PCP Adrian Small MD     Reason for Consultation:  Rectal bleeding    Godwin Fonseca is a a(n) 44 year old male.     Just discharged yesterday      Capsule w/ Dr Alfaro 10/2024 --no sb bleeding.  Flex sig 3/2024 w Danny, punctate blood on large hemorrhoids, nl sigmoid.   2023 cscope w/ Dr santana w/ polyps and diverticulosis     Seen by Dr Salinas 25 for chronic aneia, bleeding, STEMI, coronovirus and he rec'ed serial hg but no further endoscopy     Admit several days ago w/ abd pain, n/v/d. Imporved w/ conservative care    3/8 went home.  By 1 pm to 8 pm had 3 episodes of bright red blood per rectum, still some intermittent lower abd pain.  States passed stools as well.    Did eat lunch after bleeding started ~ 3 pm, bleeding after that and again 8 pm but no bleeding since    States similar symptoms in the past    Repeat CT in er w/ diverticulosis and bladder thickening, no diverticulitis    Denies chest pain, n/v, sob, neurologic changes, orthostatic symtpoms etc    States ROS otherwise negative/unchanged    Denies melena    Risk of colonoscopy including prep, sedation, bleeding, perforation, infection, miss rate or issues with accuracy, etc and possible morbidity/mortalty discussed.  We discussed risk, benefit, alternatives, limitations as well as not having the procedures.  All questions answered, pt demonstrated understanding.    negative for - appetite loss, gas/bloating, heartburn, hematemesis, melena, nausea/vomiting, stool incontinence, or swallowing difficulty/pain      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 (HCC)    Chronic non-seasonal allergic rhinitis    Chronic sinusitis, unspecified location    ADHD (attention deficit hyperactivity  disorder), inattentive type    Bipolar depression (HCC)    Essential hypertension    Mild persistent asthma without complication (HCC)    CKD (chronic kidney disease) stage 3, GFR 30-59 ml/min ([facility])    Self-inflicted injury    Bipolar disorder in partial remission, most recent episode unspecified type (HCC)    Family history of prostate cancer    Fatigue, unspecified type    Alkaline phosphatase elevation    Hyperlipidemia, mixed    Low serum HDL    Spinal stenosis of lumbar region with neurogenic claudication    Prolapsed lumbar disc    Status post lumbar surgery    Cauda equina syndrome ([facility])    Lumbar disc prolapse with compression radiculopathy    Syncope and collapse    Hypokalemia    Orthostatic hypotension    Hypertension, unspecified type    Weight loss    Chest pain, unspecified type    History of mitral valve stenosis    Acute pericarditis, unspecified type ([facility])    Cervical radiculopathy    Elevated blood protein    IgG monoclonal protein disorder    MGUS (monoclonal gammopathy of unknown significance)    Hypertensive urgency    Bipolar 2 disorder ([facility])    Employment problem    Stress    Anxiety disorder, unspecified type    Weakness of left lower extremity    Rectal bleeding    Paresthesia of foot, bilateral    Obesity (BMI 30-39.9)    TIA (transient ischemic attack)    TMJ dysfunction    Inverted papilloma of nasal cavity    Type 2 diabetes mellitus with stage 3b chronic kidney disease, without long-term current use of insulin (HCC)    Acute kidney injury    Metabolic acidosis    Hyperglycemia    Chronic kidney disease, unspecified CKD stage    Abdominal distension    SOB (shortness of breath)    Hypertensive crisis    Acute on chronic congestive heart failure, unspecified heart failure type (HCC)    Acute congestive heart failure (HCC)    Acute congestive heart failure, unspecified heart failure type (HCC)    Acute on chronic diastolic congestive heart failure (HCC)    Stage 4 chronic kidney disease  (HCC)    ROBERT (acute kidney injury)    Abdominal pain, left lower quadrant    Hypertensive emergency    Acute chest pain    Acute on chronic renal insufficiency    Chronic abdominal pain    Nonintractable headache, unspecified chronicity pattern, unspecified headache type    Chronic right shoulder pain    HCAP (healthcare-associated pneumonia)    Acute intractable headache, unspecified headache type    ESRD (end stage renal disease) on dialysis ([facility])    Diarrhea, unspecified type    Primary hypertension    Dyspnea, unspecified type    Abdominal pain, acute    ESRD on dialysis ([facility])    Fluid overload    ESRD needing dialysis ([facility])    COVID-19 virus infection    Hypotension, unspecified hypotension type    Anemia    Acute renal failure (ARF)    Neck pain    Acute nonintractable headache, unspecified headache type    GI bleed    Chronic kidney disease with end stage renal failure on dialysis (HCC)    Lower abdominal pain    ESRD (end stage renal disease) ([facility])    Resistant hypertension    Community acquired pneumonia of right lower lobe of lung    Acute renal failure with acute renal cortical necrosis superimposed on stage 4 chronic kidney disease ([facility])    Acute renal failure, unspecified acute renal failure type    Hypervolemia, unspecified hypervolemia type    End stage renal disease on dialysis ([facility])    Acute respiratory failure with hypoxia ([facility])    Stage 5 chronic kidney disease on chronic dialysis ([facility])    Anemia, unspecified type    Hyperkalemia    Hemodialysis catheter infection, initial encounter    Type 2 diabetes mellitus with chronic kidney disease on chronic dialysis, without long-term current use of insulin ([facility])    Coronary artery disease involving native coronary artery of native heart without angina pectoris    Pneumonia of right lower lobe due to infectious organism    Expressive aphasia    Uncontrolled hypertension    Bipolar disorder with moderate depression ([facility])    BRBPR (bright red blood per  rectum)    Weakness    Nosebleed    ESRD on hemodialysis (HCC)    Acute colitis    Chest pain of uncertain etiology    Medically noncompliant    Dialysis patient, noncompliant    History of lower GI bleeding    Fever, unspecified fever cause    Nosocomial pneumonia    Nausea and vomiting in adult    Anemia due to chronic kidney disease, on chronic dialysis (HCC)    Chronic renal failure (CRF), stage 5 (HCC)    Diverticulosis of colon with hemorrhage         History:  Past Medical History:    Anxiety state    Arrhythmia    Asthma ([facility])    Attention deficit hyperactivity disorder (ADHD)    Back problem    Bipolar 1 disorder ([facility])    CKD (chronic kidney disease) stage 3, GFR 30-59 ml/min ([facility])    Dr Meeks    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[facility])    Congestive heart disease ([facility])    COPD (chronic obstructive pulmonary disease) ([facility])    Coronary atherosclerosis    Deep vein thrombosis ([facility])    at age 17 R/T cast    Depression    Diabetes ([facility])    Dialysis patient    Diverticulosis of large intestine    Essential hypertension    3/21 echo: severe concentric LVH with normal EF and no MR or pHTN    Extrinsic asthma, unspecified    Heart attack ([facility])    2016- angiogram- no intervention    Heart valve disease    mitral valve repair in 1994/    High blood pressure    High cholesterol    History of blood transfusion    History of mitral valve repair    Hyperlipidemia    Low back pain    tight and stiff after sweeping and mopping    LVH (left ventricular hypertrophy)    Migraines    Mixed hyperlipidemia     HDL 38 LDL 97 VLDL 57     Monoclonal gammopathy    IgG kappa     Muscle weakness    MVP (mitral valve prolapse)    Repair 1994 at [place]; echoes as recently as 3/21 show mild or trivial MR and no stenosis    Neuropathy    Osteoarthritis    hip ,knees    Pneumonia due to organism    Pulmonary embolism ([facility])    Renal disorder    Stroke ([facility])    TIA (transient ischemic attack)    Initial history of left-sided weakness  and slurred speech. (+) cocaine. MRI of the brain, CT angiogram of the head and neck, and 2D echo are all unremarkable.     TMJ (dislocation of temporomandibular joint)    Troponin level elevated    Trop 60 60 47 with TIA and no CP: Lexiscan negative with EF 51    Visual impairment       Past Surgical History:   Procedure Laterality Date    Av fistula revision, open Left     Cabg      Colonoscopy N/A 03/26/2023    Procedure: COLONOSCOPY;  Surgeon: Heath Vu MD;  Location:  ENDOSCOPY    Colonoscopy N/A 12/30/2023    Procedure: COLONOSCOPY with cold snare polypectomy and forcep polypectomy;  Surgeon: Ousmane Suarez MD;  Location:  ENDOSCOPY    Colonoscopy & polypectomy  2019    Egd  2019    Duodenitis. Biopsied. EUS for weight loss was negative    Heart surgery      Hernia surgery  08/17/2022    Dr Barnes    Laminectomy,>2 sgmt,lumbar  09/06/2018    L4-L5 Decomp Discectomy ROEM L4-L5    Mitralplasty w cp bypass  1994    Christiano: Repair    Repair rotator cuff,chronic Left     torn and had a ruptured bicep    Sinus surgery        Spine surgery procedure unlisted      Valve repair  1994    mitral valve       Family History   Problem Relation Age of Onset    Hypertension Father     Alcohol and Other Disorders Associated Father     Substance Abuse Father         cocaine    Dementia Father     Cancer Father         lung    Diabetes Mother     Cancer Mother         multiple myeloma    Hypertension Mother     Anxiety Maternal Aunt     Depression Maternal Aunt     Anxiety Maternal Aunt     Depression Maternal Aunt     Bipolar Disorder Maternal Aunt     Diabetes Maternal Grandmother     Hypertension Maternal Grandmother     Cancer Maternal Grandfather         stomach cancer    Diabetes Maternal Grandfather     Hypertension Maternal Grandfather     Alcohol and Other Disorders Associated Maternal Grandfather     Hypertension Paternal Grandmother     Hypertension Paternal Grandfather     Cancer Sister         uterine and  ovarian    Hypertension Sister     Cancer Maternal Uncle         lung    Cancer Paternal Aunt         throat        reports that he quit smoking about 3 years ago. His smoking use included cigarettes. He started smoking about 30 years ago. He has a 27 pack-year smoking history. He has never been exposed to tobacco smoke. He has never used smokeless tobacco. He reports that he does not drink alcohol and does not use drugs.    Allergies:  Allergies[1]    Medications:  Current Facility-Administered Medications   Medication Dose Route Frequency    albuterol (Ventolin HFA) 108 (90 Base) MCG/ACT inhaler 2 puff  2 puff Inhalation Q6H PRN    ARIPiprazole (Abilify) tab 10 mg  10 mg Oral Daily    ARIPiprazole (Abilify) tab 5 mg  5 mg Oral Daily    buPROPion ER (Wellbutrin XL) 24 hr tab 150 mg  150 mg Oral Daily    calcium acetate (Phoslo) cap 667 mg  667 mg Oral TID with meals    carvedilol (Coreg) tab 25 mg  25 mg Oral BID with meals    cloNIDine (Catapres) tab 0.1 mg  0.1 mg Oral BID    ergocalciferol (Vitamin D2) cap 50,000 Units  50,000 Units Oral Weekly    FLUoxetine (PROzac) cap 40 mg  40 mg Oral Daily    fluticasone propionate (Flonase) 50 MCG/ACT nasal suspension 1 spray  1 spray Each Nare BID    gabapentin (Neurontin) cap 200 mg  200 mg Oral TID    hydrALAZINE (Apresoline) tab 25 mg  25 mg Oral TID    HYDROcodone-acetaminophen (Norco)  MG per tab 1 tablet  1 tablet Oral Q6H PRN    lamoTRIgine (LaMICtal) tab 100 mg  100 mg Oral Daily    losartan (Cozaar) tab 100 mg  100 mg Oral Daily    NIFEdipine ER (Procardia-XL) 24 hr tab 30 mg  30 mg Oral Daily    ondansetron (Zofran-ODT) disintegrating tab 4 mg  4 mg Oral Q8H PRN    sevelamer carbonate (Renvela) tab 2,400 mg  2,400 mg Oral TID CC    tamsulosin (Flomax) cap 0.4 mg  0.4 mg Oral Daily    lisdexamfetamine (Vyvanse) cap 60 mg  60 mg Oral QAM    acetaminophen (Tylenol Extra Strength) tab 500 mg  500 mg Oral Q4H PRN    ondansetron (Zofran) 4 MG/2ML injection 4 mg   4 mg Intravenous Q6H PRN    prochlorperazine (Compazine) 10 MG/2ML injection 5 mg  5 mg Intravenous Q8H PRN    polyethylene glycol-electrolyte (Golytely) 236 g oral solution 4,000 mL  4,000 mL Oral Once       Prior to Admission Medications   Prescriptions Last Dose Informant Patient Reported? Taking?   ARIPiprazole 10 MG Oral Tab   Yes Yes   Sig: Take 1 tablet (10 mg total) by mouth daily. Take 10mg (1 tablet) by mouth daily. Take each dose with 1 tablet of 5mg aripiprazole for a total daily dose of 15mg aripiprazole.   ARIPiprazole 5 MG Oral Tab   Yes Yes   Sig: Take 1 tablet (5 mg total) by mouth daily. Take 5mg (1 tablet) by mouth daily. Take each dose with 1 tablet of 10mg aripiprazole for a total daily dose of 15mg aripiprazole.   FLUoxetine HCl 40 MG Oral Cap   No Yes   Sig: Take 1 capsule (40 mg total) by mouth daily.   Fluticasone Propionate 50 MCG/ACT Nasal Suspension   No Yes   Sig: SPRAY ONCE INTO EACH NOSTRIL BID PRN   HYDROcodone-acetaminophen  MG Oral Tab   Yes Yes   Sig: Take 1 tablet by mouth every 6 (six) hours as needed for Pain.   NIFEdipine ER 30 MG Oral Tablet 24 Hr   No Yes   Sig: Take 1 tablet (30 mg total) by mouth daily.   VYVANSE 60 MG Oral Cap   Yes Yes   Sig: Take 1 capsule (60 mg total) by mouth every morning.   albuterol 108 (90 Base) MCG/ACT Inhalation Aero Soln   Yes Yes   Sig: Inhale 2 puffs into the lungs every 6 (six) hours as needed for Wheezing.   buPROPion  MG Oral Tablet 24 Hr   Yes Yes   Sig: Take 1 tablet (150 mg total) by mouth daily.   calcium acetate 667 MG Oral Cap   No Yes   Sig: Take 1 capsule (667 mg total) by mouth with breakfast, with lunch, and with evening meal.   carvedilol 25 MG Oral Tab   No Yes   Sig: Take 1 tablet (25 mg total) by mouth in the morning and 1 tablet (25 mg total) in the evening. Take with meals.   cloNIDine 0.1 MG Oral Tab   No Yes   Sig: Take 1 tablet (0.1 mg total) by mouth 2 (two) times daily.   ergocalciferol 1.25 MG (26337 UT)  Oral Cap   Yes Yes   Sig: Take 1 capsule (50,000 Units total) by mouth once a week.   gabapentin 100 MG Oral Cap   No Yes   Sig: Take 2 capsules (200 mg total) by mouth 3 (three) times daily.   hydrALAZINE 25 MG Oral Tab   Yes Yes   Sig: Take 1 tablet (25 mg total) by mouth 3 (three) times daily.   lamoTRIgine 100 MG Oral Tab   Yes Yes   Sig: Take 1 tablet (100 mg total) by mouth daily.   losartan 100 MG Oral Tab   Yes Yes   Sig: Take 1 tablet (100 mg total) by mouth daily.   ondansetron 4 MG Oral Tablet Dispersible   Yes Yes   Sig: Take 1 tablet (4 mg total) by mouth every 8 (eight) hours as needed for Nausea.   sevelamer carbonate 800 MG Oral Tab   No Yes   Sig: Take 3 tablets (2,400 mg total) by mouth 3 (three) times daily with meals.   tamsulosin 0.4 MG Oral Cap   Yes Yes   Sig: Take 1 capsule (0.4 mg total) by mouth daily.      Facility-Administered Medications: None         Medications Ordered Prior to Encounter[2]      Review of Systems:  States ros negative/unchanged  GENERAL HEALTH: otherwise feels well, no change in energy level  SKIN: denies any unusual skin lesions or rashes  EYES: denies new visual complaints or deficits  HEENT: denies new nasal congestion, sinus pain or sore throat, hearing changes  RESPIRATORY: denies new/changing shortness of breath     CARDIOVASCULAR: denies chest pain or RAMSAY; no palpitations    GI: as above  GENITAL//GYN: dialysis pt. States intermittent prostatitis  MUSCULOSKELETAL: denies new joint issues  NEURO: denies new sensory or motor complaint  PSYCHE: mood is unchanged a except as stated above  HEMATOLOGY: denies bruising or excessive bleeding except as stated  ENDOCRINE: denies unexpected wt gain or wt loss except as stated  ALLERGY/IMM.:medication allergies as above        PHYSICAL EXAM   BP (!) 185/119   Pulse 103   Temp 97.8 °F (36.6 °C)   Resp 22   Ht 6' 2\" (1.88 m)   Wt 240 lb (108.9 kg)   SpO2 98%   BMI 30.81 kg/m²     GENERAL: well developed, well  nourished, in no apparent distress  HEENT: normocephalic, mucous membranes moist.  EYES: eomi   NECK:non tender, supple  RESPIRATORY: clear to percussion and auscultation  CARDIOVASCULAR: reg rate     ABDOMEN: normal, active bowel sounds, no masses, HSM or tenderness, soft, nondistended, no G/R/R , no pain w/ palpation  RECTAL: light tint of pink liquid/mucous on laverne, no gross blood or pain   EXTREMITIES: no le edema  NEURO:  Oriented x 3   BACK: no CVA tenderness  PSYCH: appropriate for the exam           LAB/IMAGING RESULTS:     Lab Results   Component Value Date    PTT 27.8 03/08/2025    INR 1.07 03/08/2025     Recent Labs   Lab 03/06/25  0556 03/07/25  0551 03/08/25 2214   GLU 97 132* 97   BUN 42* 55* 51*   CREATSERUM 7.33* 8.74* 7.66*   CA 8.8 8.9 8.8    142 142   K 3.4* 3.8 4.1    105 106   CO2 27.0 23.0 24.0       Recent Labs   Lab 03/05/25  0803 03/05/25  1200 03/07/25  0551 03/07/25  1126 03/08/25  2214   RBC 2.62*  --  2.51*  --  2.48*   HGB 8.3*   < > 7.9*   < > 8.1*   HCT 23.9*  --  23.5*  --  24.1*   MCV 91.2  --  93.6  --  97.2   MCH 31.7  --  31.5  --  32.7   MCHC 34.7  --  33.6  --  33.6   RDW 14.4  --  14.9  --  15.2   NEPRELIM 5.39  --  3.96  --  6.59   WBC 7.9  --  6.4  --  9.8   .0  --  215.0  --  217.0    < > = values in this interval not displayed.       Recent Labs   Lab 03/04/25  1739 03/05/25  0803 03/06/25  0556 03/07/25  0551 03/08/25  2214   ALKPHO 97  --   --   --  93   BILT 0.4  --   --   --  0.3   TP 7.0  --   --   --  6.7   ALB 3.9   < > 3.8 4.0 3.9   ALT 35  --   --   --  38   AST 33  --   --   --  29    < > = values in this interval not displayed.       No results for input(s): \"JAMIL\", \"LIP\" in the last 168 hours.        ASSESSMENT AND PLAN:   1 rectal bleeding -- hg stable so far, describes heavier bleeding than what he felt was his hemorrhoids in the past    No bleeding for last ~ 7 hrs.    Has had diverticulosis in the past    Has had rectal bleeding in the  past as well    Avm, diverticula, polyp, malignancy, hemorrhoids, other more proximal source etc possible    Risk of benign or self limited process vs more dangerous or even life threatening processes discussed.  --serial hg/follow labs  --PEG prep now  --we discussed doing cscope later today vs ongoing monitoring but doing the prep now regardless in case bleeding becomes heavier            pt demonstrated understanding of risks of morbidity/mortality if diagnoses and/or treatments were delayed balanced against risks of further investigation and/or treatment.       --the pt demonstrated understanding of the results and recommendations and options and the risk/benefit/limitations and alternatives to the plan.  All of their questions and concerns were addressed and were agreeable to the plan as listed      Bakari Noguera MD  3/9/2025  3:05 AM         [1]   Allergies  Allergen Reactions    Hydrochlorothiazide RASH and HIVES    Fentanyl ITCHING and NAUSEA ONLY   [2]   Current Facility-Administered Medications on File Prior to Encounter   Medication Dose Route Frequency Provider Last Rate Last Admin    [COMPLETED] epoetin sylvia (Epogen, Procrit) 5,000 Units injection  5,000 Units Intravenous Once Lenka Bond MD   5,000 Units at 03/07/25 1600    [COMPLETED] butalbital-acetaminophen-caffeine (Fioricet) -40 MG per tab 1 tablet  1 tablet Oral Once Angus Ferguson MD   1 tablet at 03/06/25 0006    [COMPLETED] potassium chloride (Klor-Con M20) tab 20 mEq  20 mEq Oral Once Lenka Bond MD   20 mEq at 03/06/25 0943    [COMPLETED] epoetin sylvia (Epogen, Procrit) 5,000 Units injection  5,000 Units Intravenous Once Lenka Bond MD   5,000 Units at 03/05/25 1400    [COMPLETED] iopamidol 76% (ISOVUE-370) injection for power injector  100 mL Intravenous ONCE PRN Regina Salmon MD   100 mL at 03/05/25 1316    [COMPLETED] ondansetron (Zofran) 4 MG/2ML injection 4 mg  4 mg Intravenous Once Gavi Charles MD   4 mg at  25 173    [COMPLETED] labetalol (Trandate) 5 mg/mL injection 20 mg  20 mg Intravenous Once Gavi Charles MD   20 mg at 25 173    [COMPLETED] HYDROmorphone (Dilaudid) 1 MG/ML injection 0.5 mg  0.5 mg Intravenous Q30 Min PRN Gavi Charles MD   0.5 mg at 25 203    [COMPLETED] labetalol (Trandate) 5 mg/mL injection 20 mg  20 mg Intravenous Once Gavi Charles MD   20 mg at 25 183    [COMPLETED] piperacillin-tazobactam (Zosyn) 4.5 g in dextrose 5% 100 mL IVPB-ADDV  4.5 g Intravenous Once Gavi Charles MD   Stopped at 25 1916    [] sodium chloride 0.9 % IV bolus 100 mL  100 mL Intravenous Q30 Min PRN Samaria Nava MD        And    [] albumin human (Albumin) 25% injection 25 g  25 g Intravenous PRN Dialysis Samaria Nava MD        [COMPLETED] HYDROmorphone (Dilaudid) 1 MG/ML injection 0.5 mg  0.5 mg Intravenous Once Yuriy Forrest MD   0.5 mg at 25 2252    [COMPLETED] vancomycin (Vancocin) 750 mg in sodium chloride 0.9% 250 mL IVPB-ADDV  7.5 mg/kg Intravenous Once Malissa Monk  mL/hr at 25 1423 750 mg at 25 1423    [COMPLETED] HYDROmorphone (Dilaudid) 1 MG/ML injection 0.5 mg  0.5 mg Intravenous Once Jhonny Rebolledo MD   0.5 mg at 25 0142    [COMPLETED] HYDROmorphone (Dilaudid) 1 MG/ML injection 0.4 mg  0.4 mg Intravenous Once Ria Gutierrez MD   0.4 mg at 25 1853    [COMPLETED] vancomycin (Vancocin) 750 mg in sodium chloride 0.9% 250 mL IVPB-ADDV  7.5 mg/kg Intravenous Once Joseph, Jagdeep,  mL/hr at 25 750 mg at 25    [COMPLETED] lidocaine-EPINEPHrine (Xylocaine-Epinephrine) 2 %-1:309506 injection             [COMPLETED] lidocaine (Xylocaine) 1 % injection             [COMPLETED] fentaNYL (Sublimaze) 50 mcg/mL injection             [COMPLETED] vancomycin (Vancocin) 2.25 g in sodium chloride 0.9% 500 mL IVPB premix  25 mg/kg Intravenous Once Stacy Shane MD    Stopped at 25 1038    [COMPLETED] morphINE PF 4 MG/ML injection 4 mg  4 mg Intravenous Once Stacy Shane MD   4 mg at 25 0746    [COMPLETED] cloNIDine (Catapres) tab 0.1 mg  0.1 mg Oral Once Stacy Shane MD   0.1 mg at 25 0916    [COMPLETED] hydrALAzine (Apresoline) 20 mg/mL injection 10 mg  10 mg Intravenous Once Stacy Shane MD   10 mg at 25 0916    [COMPLETED] losartan (Cozaar) tab 100 mg  100 mg Oral Once Stacy Shane MD   100 mg at 25 0952    [COMPLETED] carvedilol (Coreg) tab 25 mg  25 mg Oral Once Stacy Shane MD   25 mg at 25 0952    [COMPLETED] NIFEdipine ER (Procardia-XL) 24 hr tab 30 mg  30 mg Oral Once Stacy Shane MD   30 mg at 25 1013    [COMPLETED] fentaNYL (Sublimaze) 50 mcg/mL injection 50 mcg  50 mcg Intravenous Once Stacy Shane MD   50 mcg at 25 1034    [] sodium chloride 0.9 % IV bolus 100 mL  100 mL Intravenous Q30 Min PRN Samaria Nava MD        And    [] albumin human (Albumin) 25% injection 25 g  25 g Intravenous PRN Dialysis Samaria Nava MD        [COMPLETED] heparin (Porcine) 1000 UNIT/ML injection 1,500 Units  1.5 mL Intracatheter Once Samaria Nava MD   1,500 Units at 25 1602    [COMPLETED] epoetin sylvia (Epogen, Procrit) 5,000 Units injection  5,000 Units Intravenous Once Lenka Bond MD   5,000 Units at 25 1610    [COMPLETED] vancomycin (Vancocin) 1,000 mg in sodium chloride 0.9% 250 mL IVPB-ADDV  10 mg/kg Intravenous Once Selam Jesus  mL/hr at 25 1612 1,000 mg at 25 1612    [COMPLETED] vancomycin (Vancocin) 1,000 mg in sodium chloride 0.9% 250 mL IVPB-ADDV  1,000 mg Intravenous Once Vandana Baker PharmD 250 mL/hr at 25 1746 1,000 mg at 25 1746    [COMPLETED] epoetin sylvia (Epogen, Procrit) 93823 UNIT/ML injection 10,000 Units  10,000 Units Intravenous Once Lenka Bond MD   10,000 Units at 25 1230    [COMPLETED] vancomycin (Vancocin) 2.25 g in sodium  chloride 0.9% 500 mL IVPB premix  25 mg/kg Intravenous Once Mathew Elizabeth,  mL/hr at 01/19/25 2033 2,250 mg at 01/19/25 2033    [COMPLETED] aspirin chewable tab 324 mg  324 mg Oral Once Deanna Cervantes MD   324 mg at 01/18/25 1111    [COMPLETED] ondansetron (Zofran) 4 MG/2ML injection 4 mg  4 mg Intravenous Once Deanna Cervantes MD   4 mg at 01/18/25 1031    [COMPLETED] acetaminophen (Tylenol Extra Strength) tab 1,000 mg  1,000 mg Oral Once Deanna Cervantes MD   1,000 mg at 01/18/25 1221    [COMPLETED] acetaminophen (Tylenol Extra Strength) tab 1,000 mg  1,000 mg Oral Once Mary Lou Feldman DO   1,000 mg at 01/07/25 0315    [COMPLETED] ketorolac (Toradol) 15 MG/ML injection 15 mg  15 mg Intravenous Once Mary Lou Feldman DO   15 mg at 01/07/25 0315    [COMPLETED] ketorolac (Toradol) 30 MG/ML injection 30 mg  30 mg Intravenous Once Del Cordova MD   30 mg at 01/06/25 0615    [COMPLETED] acetaminophen (Tylenol Extra Strength) tab 1,000 mg  1,000 mg Oral Once Del Cordova MD   1,000 mg at 01/06/25 0736    [COMPLETED] HYDROmorphone (Dilaudid) 1 MG/ML injection        Given at 12/26/24 0427    [COMPLETED] iopamidol 76% (ISOVUE-370) injection for power injector  100 mL Intravenous ONCE PRN Gama Ray, DO   100 mL at 12/25/24 1730    [COMPLETED] HYDROmorphone (Dilaudid) 1 MG/ML injection 0.5 mg  0.5 mg Intravenous Once Gama Ray DO   0.5 mg at 12/25/24 1852    [COMPLETED] HYDROmorphone (Dilaudid) 1 MG/ML injection 0.5 mg  0.5 mg Intravenous Once Vicky Weiss, DO   0.5 mg at 12/25/24 2249    [COMPLETED] epoetin sylvia (Epogen, Procrit) 71383 UNIT/ML injection 10,000 Units  10,000 Units Subcutaneous Once Lenka Bond MD   10,000 Units at 12/20/24 2008    [COMPLETED] heparin (Porcine) 1000 UNIT/ML injection 1,500 Units  1,500 Units Intracatheter Once Samaria Nava MD   1,500 Units at 12/20/24 2101    [COMPLETED] sodium ferric gluconate (Ferrlecit) 125 mg in sodium chloride 0.9% 100mL IVPB premix  125 mg  Intravenous Once Lenka Bond MD   Stopped at 24    [] sodium chloride 0.9 % IV bolus 100 mL  100 mL Intravenous Q30 Min PRN Lenka Bond MD        And    [] albumin human (Albumin) 25% injection 25 g  25 g Intravenous PRN Dialysis Lenka Bond MD        [] heparin (Porcine) 1000 UNIT/ML injection 1,500 Units  1.5 mL Intracatheter Once Lenka Bond MD        [COMPLETED] ondansetron (Zofran) 4 MG/2ML injection 4 mg  4 mg Intravenous Once Albert Rock MD   4 mg at 24 1144    [COMPLETED] pantoprazole (Protonix) 40 mg in sodium chloride 0.9% PF 10 mL IV push  40 mg Intravenous Once Albert Rock MD   40 mg at 24 1145     Current Outpatient Medications on File Prior to Encounter   Medication Sig Dispense Refill    HYDROcodone-acetaminophen  MG Oral Tab Take 1 tablet by mouth every 6 (six) hours as needed for Pain.      hydrALAZINE 25 MG Oral Tab Take 1 tablet (25 mg total) by mouth 3 (three) times daily.      cloNIDine 0.1 MG Oral Tab Take 1 tablet (0.1 mg total) by mouth 2 (two) times daily. 60 tablet 0    NIFEdipine ER 30 MG Oral Tablet 24 Hr Take 1 tablet (30 mg total) by mouth daily. 30 tablet 0    sevelamer carbonate 800 MG Oral Tab Take 3 tablets (2,400 mg total) by mouth 3 (three) times daily with meals. 270 tablet 0    calcium acetate 667 MG Oral Cap Take 1 capsule (667 mg total) by mouth with breakfast, with lunch, and with evening meal. 90 capsule 0    ARIPiprazole 5 MG Oral Tab Take 1 tablet (5 mg total) by mouth daily. Take 5mg (1 tablet) by mouth daily. Take each dose with 1 tablet of 10mg aripiprazole for a total daily dose of 15mg aripiprazole.      ARIPiprazole 10 MG Oral Tab Take 1 tablet (10 mg total) by mouth daily. Take 10mg (1 tablet) by mouth daily. Take each dose with 1 tablet of 5mg aripiprazole for a total daily dose of 15mg aripiprazole.      ergocalciferol 1.25 MG (12473 UT) Oral Cap Take 1 capsule (50,000 Units total) by mouth  once a week.      losartan 100 MG Oral Tab Take 1 tablet (100 mg total) by mouth daily.      ondansetron 4 MG Oral Tablet Dispersible Take 1 tablet (4 mg total) by mouth every 8 (eight) hours as needed for Nausea.      gabapentin 100 MG Oral Cap Take 2 capsules (200 mg total) by mouth 3 (three) times daily. 180 capsule 0    VYVANSE 60 MG Oral Cap Take 1 capsule (60 mg total) by mouth every morning.      lamoTRIgine 100 MG Oral Tab Take 1 tablet (100 mg total) by mouth daily.      albuterol 108 (90 Base) MCG/ACT Inhalation Aero Soln Inhale 2 puffs into the lungs every 6 (six) hours as needed for Wheezing.      tamsulosin 0.4 MG Oral Cap Take 1 capsule (0.4 mg total) by mouth daily.      buPROPion  MG Oral Tablet 24 Hr Take 1 tablet (150 mg total) by mouth daily.      FLUoxetine HCl 40 MG Oral Cap Take 1 capsule (40 mg total) by mouth daily. 7 capsule 0    carvedilol 25 MG Oral Tab Take 1 tablet (25 mg total) by mouth in the morning and 1 tablet (25 mg total) in the evening. Take with meals. 60 tablet 6    Fluticasone Propionate 50 MCG/ACT Nasal Suspension SPRAY ONCE INTO EACH NOSTRIL BID PRN 15.8 mL 0

## 2025-03-09 NOTE — ED QUICK NOTES
Orders for admission, patient is aware of plan and ready to go upstairs. Any questions, please call ED RN Jayme  at extension [phone].     Patient Covid vaccination status: Fully vaccinated     COVID Test Ordered in ED: None    COVID Suspicion at Admission: N/A    Running Infusions:  None    Mental Status/LOC at time of transport: A x O 4    Other pertinent information:   CIWA score: N/A   NIH score:  N/A

## 2025-03-09 NOTE — PLAN OF CARE
Problem: GASTROINTESTINAL - ADULT  Goal: Minimal or absence of nausea and vomiting  Description: INTERVENTIONS:  - Maintain adequate hydration with IV or PO as ordered and tolerated  - Nasogastric tube to low intermittent suction as ordered  - Evaluate effectiveness of ordered antiemetic medications  - Provide nonpharmacologic comfort measures as appropriate  - Advance diet as tolerated, if ordered  - Obtain nutritional consult as needed  - Evaluate fluid balance  Outcome: Progressing  Goal: Maintains or returns to baseline bowel function  Description: INTERVENTIONS:  - Assess bowel function  - Maintain adequate hydration with IV or PO as ordered and tolerated  - Evaluate effectiveness of GI medications  - Encourage mobilization and activity  - Obtain nutritional consult as needed  - Establish a toileting routine/schedule  - Consider collaborating with pharmacy to review patient's medication profile  Outcome: Progressing     Problem: GENITOURINARY - ADULT  Goal: Absence of urinary retention  Description: INTERVENTIONS:  - Assess patient’s ability to void and empty bladder  - Monitor intake/output and perform bladder scan as needed  - Follow urinary retention protocol/standard of care  - Consider collaborating with pharmacy to review patient's medication profile  - Implement strategies to promote bladder emptying  Outcome: Progressing     Problem: METABOLIC/FLUID AND ELECTROLYTES - ADULT  Goal: Glucose maintained within prescribed range  Description: INTERVENTIONS:  - Monitor Blood Glucose as ordered  - Assess for signs and symptoms of hyperglycemia and hypoglycemia  - Administer ordered medications to maintain glucose within target range  - Assess barriers to adequate nutritional intake and initiate nutrition consult as needed  - Instruct patient on self management of diabetes  Outcome: Progressing  Goal: Electrolytes maintained within normal limits  Description: INTERVENTIONS:  - Monitor labs and rhythm and  assess patient for signs and symptoms of electrolyte imbalances  - Administer electrolyte replacement as ordered  - Monitor response to electrolyte replacements, including rhythm and repeat lab results as appropriate  - Fluid restriction as ordered  - Instruct patient on fluid and nutrition restrictions as appropriate  Outcome: Progressing  Goal: Hemodynamic stability and optimal renal function maintained  Description: INTERVENTIONS:  - Monitor labs and assess for signs and symptoms of volume excess or deficit  - Monitor intake, output and patient weight  - Monitor urine specific gravity, serum osmolarity and serum sodium as indicated or ordered  - Monitor response to interventions for patient's volume status, including labs, urine output, blood pressure (other measures as available)  - Encourage oral intake as appropriate  - Instruct patient on fluid and nutrition restrictions as appropriate  Outcome: Progressing   Admitted from home.  Pt reports heavy rectal bleeding over the past 12 hours.  Has a history of diverticulosis.  NPO.  Plan is for colonoscopy.  Alert and orientated x4.  Denies dizziness or lightheadedness.  Hypertensive.  Golytely started.

## 2025-03-09 NOTE — ANESTHESIA PREPROCEDURE EVALUATION
PRE-OP EVALUATION    Patient Name: Godwin Fonseca    Admit Diagnosis: Rectal bleeding [K62.5]    Pre-op Diagnosis: Rectal bleeding [K62.5]    COLONOSCOPY    Anesthesia Procedure: COLONOSCOPY    Surgeon(s) and Role:     Danny CHO    Pre-op vitals reviewed.  Temp: 97.3 °F (36.3 °C)  Pulse: 97  Resp: 20  BP: 167/124  SpO2: 98 %  Body mass index is 30.81 kg/m².    Current medications reviewed.  Hospital Medications:   albuterol (Ventolin HFA) 108 (90 Base) MCG/ACT inhaler 2 puff  2 puff Inhalation Q6H PRN    ARIPiprazole (Abilify) tab 10 mg  10 mg Oral Daily    ARIPiprazole (Abilify) tab 5 mg  5 mg Oral Daily    buPROPion ER (Wellbutrin XL) 24 hr tab 150 mg  150 mg Oral Daily    calcium acetate (Phoslo) cap 667 mg  667 mg Oral TID with meals    carvedilol (Coreg) tab 25 mg  25 mg Oral BID with meals    cloNIDine (Catapres) tab 0.1 mg  0.1 mg Oral BID    ergocalciferol (Vitamin D2) cap 50,000 Units  50,000 Units Oral Weekly    FLUoxetine (PROzac) cap 40 mg  40 mg Oral Daily    fluticasone propionate (Flonase) 50 MCG/ACT nasal suspension 1 spray  1 spray Each Nare BID    gabapentin (Neurontin) cap 200 mg  200 mg Oral TID    hydrALAZINE (Apresoline) tab 25 mg  25 mg Oral TID    HYDROcodone-acetaminophen (Norco)  MG per tab 1 tablet  1 tablet Oral Q6H PRN    lamoTRIgine (LaMICtal) tab 100 mg  100 mg Oral Daily    losartan (Cozaar) tab 100 mg  100 mg Oral Daily    NIFEdipine ER (Procardia-XL) 24 hr tab 30 mg  30 mg Oral Daily    ondansetron (Zofran-ODT) disintegrating tab 4 mg  4 mg Oral Q8H PRN    sevelamer carbonate (Renvela) tab 2,400 mg  2,400 mg Oral TID CC    tamsulosin (Flomax) cap 0.4 mg  0.4 mg Oral Daily    amphetamine-dextroamphetamine (Adderall) tab 10 mg  10 mg Oral BID@0800,1200    acetaminophen (Tylenol Extra Strength) tab 500 mg  500 mg Oral Q4H PRN    ondansetron (Zofran) 4 MG/2ML injection 4 mg  4 mg Intravenous Q6H PRN    prochlorperazine (Compazine) 10 MG/2ML injection 5 mg  5 mg Intravenous Q8H PRN     [COMPLETED] morphINE PF 4 MG/ML injection 4 mg  4 mg Intravenous Once    [COMPLETED] polyethylene glycol-electrolyte (Golytely) 236 g oral solution 4,000 mL  4,000 mL Oral Once    [COMPLETED] morphINE PF 4 MG/ML injection 4 mg  4 mg Intravenous Once    [COMPLETED] iopamidol 76% (ISOVUE-370) injection for power injector  100 mL Intravenous ONCE PRN       Outpatient Medications:     Medications Prior to Admission   Medication Sig Dispense Refill Last Dose/Taking    HYDROcodone-acetaminophen  MG Oral Tab Take 1 tablet by mouth every 6 (six) hours as needed for Pain.   Taking As Needed    hydrALAZINE 25 MG Oral Tab Take 1 tablet (25 mg total) by mouth 3 (three) times daily.   Taking    cloNIDine 0.1 MG Oral Tab Take 1 tablet (0.1 mg total) by mouth 2 (two) times daily. 60 tablet 0 Taking    NIFEdipine ER 30 MG Oral Tablet 24 Hr Take 1 tablet (30 mg total) by mouth daily. 30 tablet 0 Taking    sevelamer carbonate 800 MG Oral Tab Take 3 tablets (2,400 mg total) by mouth 3 (three) times daily with meals. 270 tablet 0 Taking    calcium acetate 667 MG Oral Cap Take 1 capsule (667 mg total) by mouth with breakfast, with lunch, and with evening meal. 90 capsule 0 Taking    ARIPiprazole 5 MG Oral Tab Take 1 tablet (5 mg total) by mouth daily. Take 5mg (1 tablet) by mouth daily. Take each dose with 1 tablet of 10mg aripiprazole for a total daily dose of 15mg aripiprazole.   Taking    ARIPiprazole 10 MG Oral Tab Take 1 tablet (10 mg total) by mouth daily. Take 10mg (1 tablet) by mouth daily. Take each dose with 1 tablet of 5mg aripiprazole for a total daily dose of 15mg aripiprazole.   Taking    ergocalciferol 1.25 MG (83039 UT) Oral Cap Take 1 capsule (50,000 Units total) by mouth once a week.   Taking    losartan 100 MG Oral Tab Take 1 tablet (100 mg total) by mouth daily.   Taking    ondansetron 4 MG Oral Tablet Dispersible Take 1 tablet (4 mg total) by mouth every 8 (eight) hours as needed for Nausea.   Taking As  Needed    gabapentin 100 MG Oral Cap Take 2 capsules (200 mg total) by mouth 3 (three) times daily. 180 capsule 0 Taking    VYVANSE 60 MG Oral Cap Take 1 capsule (60 mg total) by mouth every morning.   Taking    lamoTRIgine 100 MG Oral Tab Take 1 tablet (100 mg total) by mouth daily.   Taking    albuterol 108 (90 Base) MCG/ACT Inhalation Aero Soln Inhale 2 puffs into the lungs every 6 (six) hours as needed for Wheezing.   Taking As Needed    tamsulosin 0.4 MG Oral Cap Take 1 capsule (0.4 mg total) by mouth daily.   Taking    buPROPion  MG Oral Tablet 24 Hr Take 1 tablet (150 mg total) by mouth daily.   Taking    FLUoxetine HCl 40 MG Oral Cap Take 1 capsule (40 mg total) by mouth daily. 7 capsule 0 Taking    carvedilol 25 MG Oral Tab Take 1 tablet (25 mg total) by mouth in the morning and 1 tablet (25 mg total) in the evening. Take with meals. 60 tablet 6 Taking    Fluticasone Propionate 50 MCG/ACT Nasal Suspension SPRAY ONCE INTO EACH NOSTRIL BID PRN 15.8 mL 0 Taking       Allergies: Hydrochlorothiazide and Fentanyl      Anesthesia Evaluation    Patient summary reviewed.    Anesthetic Complications  (-) history of anesthetic complications         GI/Hepatic/Renal      (-) GERD       (+) chronic renal disease (stage 4 CKD) and CRI and hemodialysis                   Cardiovascular  Comment: Echo 9/22  1. Left ventricle: The cavity size was normal. Wall thickness was mildly      increased. There was concentric hypertrophy. Systolic function was      normal. The estimated ejection fraction was 55-60%. No regional wall      motion abnormalities. Features are consistent with a pseudonormal left      ventricular filling pattern, with concomitant abnormal relaxation and      increased filling pressure - grade 2 diastolic dysfunction.   2. Mitral valve: s/p mitral valve repair in 1994. There was      moderate,posteriorly directed mitral regurgitation Due to the eccentric      jet, the mitral regurgitation is likely  underestimated   3. Left atrium: The left atrium was moderately dilated.  Negative cardiovascular ROS.  ECG reviewed.  Exercise tolerance: poor     MET: <=4    (+) obesity  (+) hypertension and well controlled  (+) hyperlipidemia  (+) CAD        (+) valvular problems/murmurs (MVR in 94 but still with mod regurg) and MR       (+) CHF  (-) angina     (-) RAMSAY         Endo/Other      (+) diabetes and well controlled,  not using insulin      (+) anemia                   Pulmonary      (+) asthma  (+) COPD       (-) shortness of breath     (-) sleep apnea       Neuro/Psych      (+) depression  (+) anxiety     (+) TIA      (+) headaches  (+) psychiatric history (bipolar disorder)         Patient Active Problem List:     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 (HCC)     Chronic non-seasonal allergic rhinitis     Chronic sinusitis, unspecified location     ADHD (attention deficit hyperactivity disorder), inattentive type     Bipolar depression (HCC)     Essential hypertension     Mild persistent asthma without complication (HCC)     CKD (chronic kidney disease) stage 3, GFR 30-59 ml/min (HCC)     Self-inflicted injury     Bipolar disorder in partial remission, most recent episode unspecified type (HCC)     Family history of prostate cancer     Fatigue, unspecified type     Alkaline phosphatase elevation     Hyperlipidemia, mixed     Low serum HDL     Spinal stenosis of lumbar region with neurogenic claudication     Prolapsed lumbar disc     Status post lumbar surgery     Cauda equina syndrome (HCC)     Lumbar disc prolapse with compression radiculopathy     Syncope and collapse     Hypokalemia     Orthostatic hypotension     Hypertension, unspecified type     Weight loss     Chest pain, unspecified type     History of mitral valve stenosis     Acute pericarditis, unspecified type (HCC)     Cervical radiculopathy     Elevated blood protein     IgG monoclonal protein disorder     MGUS (monoclonal gammopathy  of unknown significance)     Hypertensive urgency     Bipolar 2 disorder ([facility])     Employment problem     Stress     Anxiety disorder, unspecified type     Weakness of left lower extremity     Rectal bleeding     Paresthesia of foot, bilateral     Obesity (BMI 30-39.9)     TIA (transient ischemic attack)     TMJ dysfunction     Inverted papilloma of nasal cavity     Type 2 diabetes mellitus with stage 3b chronic kidney disease, without long-term current use of insulin (HCC)     Acute kidney injury     Metabolic acidosis     Hyperglycemia     Chronic kidney disease, unspecified CKD stage     Abdominal distension     SOB (shortness of breath)     Hypertensive crisis     Acute on chronic congestive heart failure, unspecified heart failure type (HCC)     Acute congestive heart failure (HCC)     Acute congestive heart failure, unspecified heart failure type (HCC)     Acute on chronic diastolic congestive heart failure ([facility])     Stage 4 chronic kidney disease ([facility])     ROBERT (acute kidney injury)     Abdominal pain, left lower quadrant     Hypertensive emergency     Acute chest pain     Acute on chronic renal insufficiency     Chronic abdominal pain     Nonintractable headache, unspecified chronicity pattern, unspecified headache type     Chronic right shoulder pain     HCAP (healthcare-associated pneumonia)     Acute intractable headache, unspecified headache type     ESRD (end stage renal disease) on dialysis ([facility])     Diarrhea, unspecified type     Primary hypertension     Dyspnea, unspecified type     Abdominal pain, acute     ESRD on dialysis ([facility])     Fluid overload     ESRD needing dialysis ([facility])     COVID-19 virus infection     Hypotension, unspecified hypotension type     Anemia     Acute renal failure (ARF)     Neck pain     Acute nonintractable headache, unspecified headache type     GI bleed     Chronic kidney disease with end stage renal failure on dialysis ([facility])     Lower abdominal pain     ESRD (end stage  renal disease) (HCC)     Resistant hypertension     Community acquired pneumonia of right lower lobe of lung     Acute renal failure with acute renal cortical necrosis superimposed on stage 4 chronic kidney disease (HCC)     Acute renal failure, unspecified acute renal failure type     Hypervolemia, unspecified hypervolemia type     End stage renal disease on dialysis (HCC)     Acute respiratory failure with hypoxia (HCC)     Stage 5 chronic kidney disease on chronic dialysis (HCC)     Anemia, unspecified type     Hyperkalemia     Hemodialysis catheter infection, initial encounter     Type 2 diabetes mellitus with chronic kidney disease on chronic dialysis, without long-term current use of insulin (HCC)     Coronary artery disease involving native coronary artery of native heart without angina pectoris     Pneumonia of right lower lobe due to infectious organism     Expressive aphasia     Uncontrolled hypertension     Bipolar disorder with moderate depression (HCC)     BRBPR (bright red blood per rectum)     Weakness     Nosebleed     ESRD on hemodialysis (HCC)     Acute colitis     Chest pain of uncertain etiology     Medically noncompliant     Dialysis patient, noncompliant     History of lower GI bleeding     Fever, unspecified fever cause     Nosocomial pneumonia     Nausea and vomiting in adult     Anemia due to chronic kidney disease, on chronic dialysis (HCC)     Chronic renal failure (CRF), stage 5 (HCC)     Diverticulosis of colon with hemorrhage            Past Surgical History:   Procedure Laterality Date    Av fistula revision, open Left     Cabg      Colonoscopy N/A 03/26/2023    Procedure: COLONOSCOPY;  Surgeon: Heath Vu MD;  Location:  ENDOSCOPY    Colonoscopy N/A 12/30/2023    Procedure: COLONOSCOPY with cold snare polypectomy and forcep polypectomy;  Surgeon: Ousmane Suarez MD;  Location:  ENDOSCOPY    Colonoscopy & polypectomy  2019    Egd  2019    Duodenitis. Biopsied. EUS for weight  loss was negative    Heart surgery      Hernia surgery  08/17/2022    Dr Barnes    Laminectomy,>2 sgmt,lumbar  09/06/2018    L4-L5 Decomp Discectomy ROEM L4-L5    Mitralplasty w cp bypass  1994    Christiano: Repair    Repair rotator cuff,chronic Left     torn and had a ruptured bicep    Sinus surgery        Spine surgery procedure unlisted      Valve repair  1994    mitral valve     Social History     Socioeconomic History    Marital status:    Tobacco Use    Smoking status: Former     Current packs/day: 0.00     Average packs/day: 1 pack/day for 27.0 years (27.0 ttl pk-yrs)     Types: Cigarettes     Start date: 2/28/1995     Quit date: 2/28/2022     Years since quitting: 3.0     Passive exposure: Never    Smokeless tobacco: Never   Vaping Use    Vaping status: Never Used   Substance and Sexual Activity    Alcohol use: No    Drug use: No     History   Drug Use No     Available pre-op labs reviewed.  Lab Results   Component Value Date    WBC 8.4 03/09/2025    RBC 2.51 (L) 03/09/2025    HGB 8.0 (L) 03/09/2025    HCT 24.4 (L) 03/09/2025    MCV 97.2 03/09/2025    MCH 31.9 03/09/2025    MCHC 32.8 03/09/2025    RDW 15.1 03/09/2025    .0 03/09/2025     Lab Results   Component Value Date     03/09/2025    K 4.2 03/09/2025     03/09/2025    CO2 26.0 03/09/2025    BUN 56 (H) 03/09/2025    CREATSERUM 7.81 (H) 03/09/2025    GLU 89 03/09/2025    CA 8.9 03/09/2025     Lab Results   Component Value Date    INR 1.07 03/08/2025         Airway      Mallampati: II  Mouth opening: >3 FB  TM distance: > 6 cm  Neck ROM: full Cardiovascular    Cardiovascular exam normal.  Rhythm: regular  Rate: normal     Dental  Comment: Patient denies any loose/missing/cracked teeth. No gross abnormalities or loose teeth noted on exam.      Dentition appears grossly intact         Pulmonary    Pulmonary exam normal.                 Other findings            ASA: 3   Plan: MAC  NPO status verified and patient meets  guidelines.  Patient has taken beta blockers in last 24 hours.  Post-procedure pain management plan discussed with surgeon and patient.    Comment:     A detailed discussion about the anesthetic plan was held with Godwin Fonseca in the preoperative area. Benefits and risks of MAC anesthesia were discussed, including intraoperative awareness/recall, PONV, reasonable expectations of post-operative pain/discomfort, aspiration, conversion to general anesthesia, dental injury, pressure/nerve injuries from positioning, and other serious but rare complications (life-threatening cardiopulmonary events). All questions were answered appropriately and patient demonstrated understanding of realistic expectations and risks of undergoing anesthesia. Godwin Fonseca consents to receiving anesthesia and wishes to proceed.  Plan/risks discussed with: patient              Present on Admission:  **None**

## 2025-03-09 NOTE — PLAN OF CARE
Pt Aox4.   Elevated BP.   Room air. PRN albuterol inhaler per request given.   Abdominal pain. Norco and Dilaudid PRN given.  Up independent.     GI Prep finished. Pt still not clear.   Tap water enema administered once prior colonoscopy. Procedure completed.     Pt back from the procedure.   Clear liquid diet initiated.   Will continue to monitor.     Plan for dialysis tomorrow. [facility] called.     Problem: GASTROINTESTINAL - ADULT  Goal: Minimal or absence of nausea and vomiting  Description: INTERVENTIONS:  - Maintain adequate hydration with IV or PO as ordered and tolerated  - Nasogastric tube to low intermittent suction as ordered  - Evaluate effectiveness of ordered antiemetic medications  - Provide nonpharmacologic comfort measures as appropriate  - Advance diet as tolerated, if ordered  - Obtain nutritional consult as needed  - Evaluate fluid balance  Outcome: Progressing  Goal: Maintains or returns to baseline bowel function  Description: INTERVENTIONS:  - Assess bowel function  - Maintain adequate hydration with IV or PO as ordered and tolerated  - Evaluate effectiveness of GI medications  - Encourage mobilization and activity  - Obtain nutritional consult as needed  - Establish a toileting routine/schedule  - Consider collaborating with pharmacy to review patient's medication profile  Outcome: Progressing     Problem: METABOLIC/FLUID AND ELECTROLYTES - ADULT  Goal: Glucose maintained within prescribed range  Description: INTERVENTIONS:  - Monitor Blood Glucose as ordered  - Assess for signs and symptoms of hyperglycemia and hypoglycemia  - Administer ordered medications to maintain glucose within target range  - Assess barriers to adequate nutritional intake and initiate nutrition consult as needed  - Instruct patient on self management of diabetes  Outcome: Progressing  Goal: Electrolytes maintained within normal limits  Description: INTERVENTIONS:  - Monitor labs and rhythm and assess patient for  signs and symptoms of electrolyte imbalances  - Administer electrolyte replacement as ordered  - Monitor response to electrolyte replacements, including rhythm and repeat lab results as appropriate  - Fluid restriction as ordered  - Instruct patient on fluid and nutrition restrictions as appropriate  Outcome: Progressing  Goal: Hemodynamic stability and optimal renal function maintained  Description: INTERVENTIONS:  - Monitor labs and assess for signs and symptoms of volume excess or deficit  - Monitor intake, output and patient weight  - Monitor urine specific gravity, serum osmolarity and serum sodium as indicated or ordered  - Monitor response to interventions for patient's volume status, including labs, urine output, blood pressure (other measures as available)  - Encourage oral intake as appropriate  - Instruct patient on fluid and nutrition restrictions as appropriate  Outcome: Progressing     Problem: GENITOURINARY - ADULT  Goal: Absence of urinary retention  Description: INTERVENTIONS:  - Assess patient’s ability to void and empty bladder  - Monitor intake/output and perform bladder scan as needed  - Follow urinary retention protocol/standard of care  - Consider collaborating with pharmacy to review patient's medication profile  - Implement strategies to promote bladder emptying  Outcome: Progressing

## 2025-03-09 NOTE — PROGRESS NOTES
Has drank half bottle of golytely.  Beginning to feel abdominal fullness and his lungs feel \"full\".  Saturating in high 90's.  BP is elevated.  No results frm golytely yet.  Last dialysis was Friday.

## 2025-03-09 NOTE — OPERATIVE REPORT
ENDOSCOPY OPERATIVE REPORT    Patient Name:  Godwin Fonseca  Medical Record #: [id number]  YOB: 1980  Date of Procedure: 3/9/2025    Preoperative Diagnosis:  rectal bleeding    Postoperative Diagnosis: diffuse pan-diverticulosis, hemorrhoids.  Flat 7 mm and 4 mm ascending and 3 mm transverse polyp    Procedure Performed: Colonoscopy with  snare, biopsy, and clipping      Anesthesia Given: MAC      Cecal withdrawal time: 23 minutes        Endoscopist:   Bakari Noguera MD      Procedure:   After the patient was interviewed and the procedure again discussed and questions addressed, the patient was brought to the GI Lab and monitoring of the B/P, pulse, and pulse oximetry was performed. The patient was then placed in the left lateral decubitus position and sedated with divided doses of IV medication; continuous vital signs were monitored throughout the procedure.    Medical reconciliation was completed. History and physical were reviewed. Informed consent was obtained.    The video colonoscope was inserted into the rectum after a digital rectal exam. The endoscope was advanced to the cecum as identified by the appendiceal orifice and ileocecal valve and then withdrawn. Upon insertion and withdrawl the mucosa was carefully examined and the preparation was Aronchick grade 2 (good).  The patient tolerated the procedure well.      Findings:     Endocuff used    In the proximal ascending colon, the scope was then retroflexed and withdrawn to the distal ascending colon while evaluating the mucosa. The scope was then straightened and then reinserted into the cecum and withdrawn in the forward viewing position.    A flat 7 mm and 4 mm polyp were seen in the ascemdomg and removed with the cold snare and recovered.  Mild oozing after 7 mm one was removed, 2 clips placed, no bleeding noted    A 3 mm polyp seen in the transverse and removed via cold forceps.    Pan-colonic diverticulosis was noted.    The overall  appearance of the mucosa was normal.    At the anal verge the endoscope was retroverted, large internal hemorrhoids were seen.    No other abnormalities were identified.     Throughtout the procedure vital signs were stable.        Specimens:  See above      Condition on discharge from procedure:  good      PLAN:    --he did not have a bm until after 2 L of the PEG and only old blood passed for a short time before prep cleared and there was scattered stool balls/debris of normal color scattered throughout the colon and the bleeding may have been anorectal/hemorrhoidal  --he does have diffused diverticulosis, other etio's of bleeding possible  --clears today while being monitored        MD [facility]      Addendum;    Reviewed findings and rec's w/ pt    He would like anusol, can do this for 5 days    Patient stated it is okay to use mychart to communicate any pathology or follow up recommendations etc    MD [facility]

## 2025-03-09 NOTE — H&P
[facility] Hospitalist History and Physical      Chief Complaint   Patient presents with    GI Bleeding        PCP: Adrian Small MD    History of Present Illness: Patient is a 44 year old male with history of ESRD on MWF HD, hemorrhoids, diverticulosis, hypertension and chronic anemia presented for BRBPR. Recently discharged yesterday, noted that he went home and had about 3 episodes of bright red rectal bleeding with associated abdominal pain. Pain very generalized to lower abdomen. Had very little po intake during this time. Admission work up with CT abdomen/ pelvis showing diverticulosis but no evidence of diverticulitis. Admitted for further evaluation.      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[facility])    Congestive heart disease ([facility])    COPD (chronic obstructive pulmonary disease) ([facility])    Coronary atherosclerosis    Deep vein thrombosis ([facility])    at age 17 R/T cast    Depression    Diabetes ([facility])    Dialysis patient    Diverticulosis of large intestine    Essential hypertension    3/21 echo: severe concentric LVH with normal EF and no MR or pHTN    Extrinsic asthma, unspecified    Heart attack (HCC)    2016- angiogram- no intervention    Heart valve disease    mitral valve repair in 1994/    High blood pressure    High cholesterol    History of blood transfusion    History of mitral valve repair    Hyperlipidemia    Low back pain    tight and stiff after sweeping and mopping    LVH (left ventricular hypertrophy)    Migraines    Mixed hyperlipidemia     HDL 38 LDL 97 VLDL 57     Monoclonal gammopathy    IgG kappa     Muscle weakness    MVP (mitral valve prolapse)    Repair 1994 at [place]; echoes as recently as 3/21 show mild or trivial MR  and no stenosis    Neuropathy    Osteoarthritis    hip ,knees    Pneumonia due to organism    Pulmonary embolism (HCC)    Renal disorder    Stroke (HCC)    TIA (transient ischemic attack)    Initial history of left-sided weakness and slurred speech. (+) cocaine. MRI of the brain, CT angiogram of the head and neck, and 2D echo are all unremarkable.     TMJ (dislocation of temporomandibular joint)    Troponin level elevated    Trop 60 60 47 with TIA and no CP: Lexiscan negative with EF 51    Visual impairment      Past Surgical History:   Procedure Laterality Date    Av fistula revision, open Left     Cabg      Colonoscopy N/A 03/26/2023    Procedure: COLONOSCOPY;  Surgeon: Heath Vu MD;  Location:  ENDOSCOPY    Colonoscopy N/A 12/30/2023    Procedure: COLONOSCOPY with cold snare polypectomy and forcep polypectomy;  Surgeon: Ousmane Suarez MD;  Location:  ENDOSCOPY    Colonoscopy & polypectomy  2019    Egd  2019    Duodenitis. Biopsied. EUS for weight loss was negative    Heart surgery      Hernia surgery  08/17/2022    Dr Barnes    Laminectomy,>2 sgmt,lumbar  09/06/2018    L4-L5 Decomp Discectomy ROEM L4-L5    Mitralplasty w cp bypass  1994    [place]: Repair    Repair rotator cuff,chronic Left     torn and had a ruptured bicep    Sinus surgery        Spine surgery procedure unlisted      Valve repair  1994    mitral valve      Social History     Tobacco Use    Smoking status: Former     Current packs/day: 0.00     Average packs/day: 1 pack/day for 27.0 years (27.0 ttl pk-yrs)     Types: Cigarettes     Start date: 2/28/1995     Quit date: 2/28/2022     Years since quitting: 3.0     Passive exposure: Never    Smokeless tobacco: Never   Substance Use Topics    Alcohol use: No      Family History   Problem Relation Age of Onset    Hypertension Father     Alcohol and Other Disorders Associated Father     Substance Abuse Father         cocaine    Dementia Father     Cancer Father         lung    Diabetes Mother      Cancer Mother         multiple myeloma    Hypertension Mother     Anxiety Maternal Aunt     Depression Maternal Aunt     Anxiety Maternal Aunt     Depression Maternal Aunt     Bipolar Disorder Maternal Aunt     Diabetes Maternal Grandmother     Hypertension Maternal Grandmother     Cancer Maternal Grandfather         stomach cancer    Diabetes Maternal Grandfather     Hypertension Maternal Grandfather     Alcohol and Other Disorders Associated Maternal Grandfather     Hypertension Paternal Grandmother     Hypertension Paternal Grandfather     Cancer Sister         uterine and ovarian    Hypertension Sister     Cancer Maternal Uncle         lung    Cancer Paternal Aunt         throat       Allergies[1]     Review of Systems  Comprehensive ROS reviewed and negative except for what is stated in HPI.      OBJECTIVE:  BP (!) 158/105   Pulse 92   Temp 97.3 °F (36.3 °C) (Oral)   Resp 20   Ht 6' 2\" (1.88 m)   Wt 240 lb (108.9 kg)   SpO2 98%   BMI 30.81 kg/m²   General: No apparent distress.  Alert and oriented.  HEENT:  EOMI, PERRLA.  Pulm: Clear breath sounds bilaterally.  Normal respiratory effort.  CV: Regular rate and rhythm, no murmur.   Abd: Soft, nontender, nondistended.   MSK: Moving extremities, no obvious deformities.    Skin: No lesions or rashes.  Neuro: No obvious focal deficits.    Data Review:    LABS:   Lab Results   Component Value Date    WBC 8.4 03/09/2025    HGB 8.0 03/09/2025    HCT 24.4 03/09/2025    .0 03/09/2025    CREATSERUM 7.81 03/09/2025    BUN 56 03/09/2025     03/09/2025    K 4.2 03/09/2025     03/09/2025    CO2 26.0 03/09/2025    GLU 89 03/09/2025    CA 8.9 03/09/2025    ALB 4.0 03/09/2025    ALKPHO 92 03/09/2025    BILT 0.4 03/09/2025    TP 6.6 03/09/2025    AST 30 03/09/2025    ALT 32 03/09/2025    PTT 27.8 03/08/2025    INR 1.07 03/08/2025    PTP 13.7 03/08/2025    MG 2.0 03/09/2025       All lab work and imaging personally reviewed.    Assessment/Plan:      Assessment/ Plan:     #Rectal bleeding  #Chronic anemia  -suspect this is likely related to chronic h/o hemorrhoid  -christianne completed this morning  -plan for colonoscopy this afternoon. D/w GI. Will follow.   -advance diet after procedure   -monitor hgb, hemodynamics, transfuse if Hgb <7    #ESRD  -nephro on consult for MWF HD management    #HTN  -resume home antihypertensives    #Bipolar  #HFpEF  -resume home meds accordingly     DVT ppx: SCDs  Diet: NPO , renal   Disposition: inpt     Outpatient records or previous hospital records reviewed.   [facility] hospitalist to continue to follow patient while in house.    Miguel Garsia DO  [facility] Hospitalist       [1]   Allergies  Allergen Reactions    Hydrochlorothiazide RASH and HIVES    Fentanyl ITCHING and NAUSEA ONLY

## 2025-03-09 NOTE — ED INITIAL ASSESSMENT (HPI)
Pt to ED with rectal bleeding- bright red with blood clots since 1300, abdominal pain. No blood thinners. Pt discharged from hospital today d/t PNA.   Suturegard Body: The suture ends were repeatedly re-tightened and re-clamped to achieve the desired tissue expansion.

## 2025-03-09 NOTE — ED PROVIDER NOTES
Patient Seen in: [facility] In [place]      History     Chief Complaint   Patient presents with    GI Bleeding     Stated Complaint: rectal bleeding, abdominal pain    Subjective:   HPI      44-year-old male who has experienced recurrent episodes of rectal bleeding. He has undergone four transfusions in the past, with the most recent transfusion occurring a few months ago. He reports having dialysis last Friday and was doing well at that time, although his hemoglobin was noted to be low at 8. He denies any significant belly pain. The patient describes having bloody bowel movements, with the most recent episode occurring about an hour ago. He estimates having approximately 5 bloody bowel movements today. He experiences a crampy feeling followed by passing clots.  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[facility])    Congestive heart disease ([facility])    COPD (chronic obstructive pulmonary disease) ([facility])    Coronary atherosclerosis    Deep vein thrombosis ([facility])    at age 17 R/T cast    Depression    Diabetes ([facility])    Dialysis patient    Diverticulosis of large intestine    Essential hypertension    3/21 echo: severe concentric LVH with normal EF and no MR or pHTN    Extrinsic asthma, unspecified    Heart attack ([facility])    2016- angiogram- no intervention    Heart valve disease    mitral valve repair in 1994/    High blood pressure    High cholesterol    History of blood transfusion    History of mitral valve repair    Hyperlipidemia    Low back pain    tight and stiff after sweeping and mopping    LVH (left ventricular hypertrophy)    Migraines    Mixed hyperlipidemia     HDL 38 LDL 97  VLDL 57     Monoclonal gammopathy    IgG kappa     Muscle weakness    MVP (mitral valve prolapse)    Repair 1994 at [place]; echoes as recently as 3/21 show mild or trivial MR and no stenosis    Neuropathy    Osteoarthritis    hip ,knees    Pneumonia due to organism    Pulmonary embolism (HCC)    Renal disorder    Stroke (HCC)    TIA (transient ischemic attack)    Initial history of left-sided weakness and slurred speech. (+) cocaine. MRI of the brain, CT angiogram of the head and neck, and 2D echo are all unremarkable.     TMJ (dislocation of temporomandibular joint)    Troponin level elevated    Trop 60 60 47 with TIA and no CP: Lexiscan negative with EF 51    Visual impairment              Past Surgical History:   Procedure Laterality Date    Av fistula revision, open Left     Cabg      Colonoscopy N/A 03/26/2023    Procedure: COLONOSCOPY;  Surgeon: Heath Vu MD;  Location:  ENDOSCOPY    Colonoscopy N/A 12/30/2023    Procedure: COLONOSCOPY with cold snare polypectomy and forcep polypectomy;  Surgeon: Ousmane Suarez MD;  Location:  ENDOSCOPY    Colonoscopy & polypectomy  2019    Egd  2019    Duodenitis. Biopsied. EUS for weight loss was negative    Heart surgery      Hernia surgery  08/17/2022    Dr Barnes    Laminectomy,>2 sgmt,lumbar  09/06/2018    L4-L5 Decomp Discectomy ROEM L4-L5    Mitralplasty w cp bypass  1994    [place]: Repair    Repair rotator cuff,chronic Left     torn and had a ruptured bicep    Sinus surgery        Spine surgery procedure unlisted      Valve repair  1994    mitral valve                Social History     Socioeconomic History    Marital status:    Tobacco Use    Smoking status: Former     Current packs/day: 0.00     Average packs/day: 1 pack/day for 27.0 years (27.0 ttl pk-yrs)     Types: Cigarettes     Start date: 2/28/1995     Quit date: 2/28/2022     Years since quitting: 3.0     Passive exposure: Never    Smokeless tobacco: Never   Vaping Use    Vaping  status: Never Used   Substance and Sexual Activity    Alcohol use: No    Drug use: No     Social Drivers of Health     Food Insecurity: No Food Insecurity (3/4/2025)    NCSS - Food Insecurity     Worried About Running Out of Food in the Last Year: No     Ran Out of Food in the Last Year: No   Transportation Needs: No Transportation Needs (3/4/2025)    NCSS - Transportation     Lack of Transportation: No   Housing Stability: Not At Risk (3/4/2025)    NCSS - Housing/Utilities     Has Housing: Yes     Worried About Losing Housing: No     Unable to Get Utilities: No                  Physical Exam     ED Triage Vitals [03/08/25 2124]   BP (!) 185/105   Pulse 100   Resp 20   Temp 98.3 °F (36.8 °C)   Temp src Oral   SpO2 99 %   O2 Device None (Room air)       Current Vitals:   Vital Signs  BP: (!) 178/124  Pulse: 102  Resp: 20  Temp: 98 °F (36.7 °C)  Temp src: Oral    Oxygen Therapy  SpO2: 99 %  O2 Device: None (Room air)        Physical Exam    Vital signs reviewed  General appearance: Patient is alert and in no acute distress  HEENT: Pupils equal react to light extraocular muscles intact no scleral icterus, mucous membranes are moist, there is no erythema or exudate in the posterior pharynx  Neck: Supple no JVD no lymphadenopathy no meningismus no carotid bruit  CV: Regular rate and rhythm loud diastolic murmur  Respiratory: Clear to auscultation bilaterally no crackles no wheezes no accessory muscle use  Abdomen: Left lower quadrant tenderness on exam no rebound no guarding  no hepatosplenomegaly bowel sounds are present , no pulsatile mass  Extremities: No clubbing cyanosis or edema 2+ distal pulses.  Neuro: Cranial nerves II through XII intact with no gross focal sensory or motor abnormality.      ED Course     Labs Reviewed   COMP METABOLIC PANEL (14) - Abnormal; Notable for the following components:       Result Value    BUN 51 (*)     Creatinine 7.66 (*)     Calculated Osmolality 308 (*)     eGFR-Cr 8 (*)     All  other components within normal limits   CBC WITH DIFFERENTIAL WITH PLATELET - Abnormal; Notable for the following components:    RBC 2.48 (*)     HGB 8.1 (*)     HCT 24.1 (*)     Monocyte Absolute 1.17 (*)     All other components within normal limits   PROTHROMBIN TIME (PT) - Normal   PTT, ACTIVATED - Normal   TYPE AND SCREEN    Narrative:     The following orders were created for panel order Type and screen.  Procedure                               Abnormality         Status                     ---------                               -----------         ------                     ABORH (Blood Type)[[id number]]                               Final result               Antibody Screen[[id number]]                                  Final result                 Please view results for these tests on the individual orders.   ABORH (BLOOD TYPE)   ANTIBODY SCREEN            Patient was evaluated in the emergency department and will have a type and screen CBC chemistry and coags.  He is not anticoagulated but has had to have transfusion in the past last one in a few months ago.     CT ABDOMEN+PELVIS(CONTRAST ONLY)(CPT=74177)    Result Date: 3/8/2025  CONCLUSION:   1. Diverticulosis throughout the colon without ends of diverticulitis.   2.  Continued bladder wall thickening can be seen in cystitis.  3. Chronic pleural thickening and trace effusion at the right lung base.   LOCATION:  [facility]   Dictated by (CST): Jaycob Cassidy MD on 3/08/2025 at 11:29 PM     Finalized by (CST): Jaycob Cassidy MD on 3/08/2025 at 11:33 PM          Patient does have diverticulosis throughout the colon without ends of diverticulitis.  Patient will be given a colon prep as GI wants him prep so they can take a look in the morning.  Patient was also given morphine and was having pain.  Will also hold off on giving blood at this time as patient hemoglobin is stable.  MDM      Differential diagnosis reflecting the complexity of care include: Rectal bleed,  hemorrhoid, diverticulosis, diverticulitis    Comorbidities that add complexity to management include: End-stage renal disease and recently just discharged for GI issues    External chart review was done and was noted: Hospitalist and GI notes were reviewed early as yesterday and patient was discharged home after dialysis on Friday      Discussions of management was done with: And hospitalist were consulted and will see him in the morning.    My independent interpretation of studies of: CT scan did not show any inflammatory changes consistent with diverticulitis except in the bladder area patient does appear to have diverticulosis    Diagnostic tests and medications considered but not ordered were: Blood transfusion was not started here we will keep a close eye on his hemoglobin    Social determinants of health that affect care: Chronic dialysis    Shared decision making was done by myself and patient along with GI and the hospitalist.        Admission disposition: 3/8/2025 11:58 PM           Medical Decision Making      Disposition and Plan     Clinical Impression:  1. Rectal bleeding    2. Anemia due to chronic kidney disease, on chronic dialysis (HCC)    3. Chronic renal failure (CRF), stage 5 (HCC)    4. Diverticulosis of colon with hemorrhage         Disposition:  Admit  3/8/2025 11:58 pm    Follow-up:  No follow-up provider specified.        Medications Prescribed:  Current Discharge Medication List              Supplementary Documentation:         Hospital Problems       Present on Admission  Date Reviewed: 1/18/2025            ICD-10-CM Noted POA    * (Principal) Rectal bleeding K62.5 3/12/2021 Unknown

## 2025-03-10 VITALS
WEIGHT: 251.13 LBS | HEIGHT: 74 IN | BODY MASS INDEX: 32.23 KG/M2 | TEMPERATURE: 98 F | HEART RATE: 88 BPM | RESPIRATION RATE: 18 BRPM | OXYGEN SATURATION: 96 % | SYSTOLIC BLOOD PRESSURE: 131 MMHG | DIASTOLIC BLOOD PRESSURE: 82 MMHG

## 2025-03-10 LAB
ANION GAP SERPL CALC-SCNC: 11 MMOL/L (ref 0–18)
BUN BLD-MCNC: 56 MG/DL (ref 9–23)
CALCIUM BLD-MCNC: 8.6 MG/DL (ref 8.7–10.6)
CHLORIDE SERPL-SCNC: 105 MMOL/L (ref 98–112)
CO2 SERPL-SCNC: 24 MMOL/L (ref 21–32)
CREAT BLD-MCNC: 8.15 MG/DL
EGFRCR SERPLBLD CKD-EPI 2021: 8 ML/MIN/1.73M2 (ref 60–?)
ERYTHROCYTE [DISTWIDTH] IN BLOOD BY AUTOMATED COUNT: 15 %
GLUCOSE BLD-MCNC: 97 MG/DL (ref 70–99)
HCT VFR BLD AUTO: 22.7 %
HGB BLD-MCNC: 7.8 G/DL
MCH RBC QN AUTO: 32.8 PG (ref 26–34)
MCHC RBC AUTO-ENTMCNC: 34.4 G/DL (ref 31–37)
MCV RBC AUTO: 95.4 FL
OSMOLALITY SERPL CALC.SUM OF ELEC: 305 MOSM/KG (ref 275–295)
PLATELET # BLD AUTO: 189 10(3)UL (ref 150–450)
POTASSIUM SERPL-SCNC: 4.5 MMOL/L (ref 3.5–5.1)
RBC # BLD AUTO: 2.38 X10(6)UL
SODIUM SERPL-SCNC: 140 MMOL/L (ref 136–145)
WBC # BLD AUTO: 6.7 X10(3) UL (ref 4–11)

## 2025-03-10 PROCEDURE — 85027 COMPLETE CBC AUTOMATED: CPT | Performed by: STUDENT IN AN ORGANIZED HEALTH CARE EDUCATION/TRAINING PROGRAM

## 2025-03-10 PROCEDURE — 5A1D70Z PERFORMANCE OF URINARY FILTRATION, INTERMITTENT, LESS THAN 6 HOURS PER DAY: ICD-10-PCS | Performed by: STUDENT IN AN ORGANIZED HEALTH CARE EDUCATION/TRAINING PROGRAM

## 2025-03-10 PROCEDURE — 94760 N-INVAS EAR/PLS OXIMETRY 1: CPT

## 2025-03-10 PROCEDURE — 90935 HEMODIALYSIS ONE EVALUATION: CPT | Performed by: INTERNAL MEDICINE

## 2025-03-10 PROCEDURE — 80048 BASIC METABOLIC PNL TOTAL CA: CPT | Performed by: STUDENT IN AN ORGANIZED HEALTH CARE EDUCATION/TRAINING PROGRAM

## 2025-03-10 RX ORDER — ATORVASTATIN CALCIUM 20 MG/1
1 TABLET, FILM COATED ORAL EVERY EVENING
COMMUNITY

## 2025-03-10 RX ORDER — PHENYLEPHRINE HYDROCHLORIDE 6.25 MG/1
1 SUPPOSITORY RECTAL 4 TIMES DAILY PRN
Qty: 24 SUPPOSITORY | Refills: 0 | Status: SHIPPED | COMMUNITY
Start: 2025-03-10

## 2025-03-10 NOTE — PROGRESS NOTES
[facility]   part of [facility]    Nephrology Progress Note    Godwin Fonseca Attending:  Miguel Garsia DO     Cc: ESRD    SUBJECTIVE     No complaints    PHYSICAL EXAM   Vital signs: /77 (BP Location: Right arm)   Pulse 92   Temp 97.9 °F (36.6 °C) (Oral)   Resp 18   Ht 6' 2\" (1.88 m)   Wt 251 lb 1.6 oz (113.9 kg)   SpO2 96%   BMI 32.24 kg/m²   Temp (24hrs), Av.7 °F (36.5 °C), Min:97.4 °F (36.3 °C), Max:97.9 °F (36.6 °C)       Intake/Output Summary (Last 24 hours) at 3/10/2025 1522  Last data filed at 3/10/2025 1440  Gross per 24 hour   Intake 1860 ml   Output --   Net 1860 ml     Wt Readings from Last 3 Encounters:   03/10/25 251 lb 1.6 oz (113.9 kg)   25 225 lb (102.1 kg)   25 246 lb (111.6 kg)     General: NAD  HEENT: NCAT, EOMI, MMM  Neck: Supple   Cardiac: Regular rate and rhythm   Lungs: CTAB  Abdomen: Soft, non-tender, nondistended   Extremities: No edema  Neurologic: No asterxis  Skin: Warm and dry, no rashes     MEDS     Current Facility-Administered Medications   Medication Dose Route Frequency    albuterol (Ventolin HFA) 108 (90 Base) MCG/ACT inhaler 2 puff  2 puff Inhalation Q6H PRN    ARIPiprazole (Abilify) tab 10 mg  10 mg Oral Daily    ARIPiprazole (Abilify) tab 5 mg  5 mg Oral Daily    buPROPion ER (Wellbutrin XL) 24 hr tab 150 mg  150 mg Oral Daily    calcium acetate (Phoslo) cap 667 mg  667 mg Oral TID with meals    carvedilol (Coreg) tab 25 mg  25 mg Oral BID with meals    cloNIDine (Catapres) tab 0.1 mg  0.1 mg Oral BID    ergocalciferol (Vitamin D2) cap 50,000 Units  50,000 Units Oral Weekly    FLUoxetine (PROzac) cap 40 mg  40 mg Oral Daily    fluticasone propionate (Flonase) 50 MCG/ACT nasal suspension 1 spray  1 spray Each Nare BID    gabapentin (Neurontin) cap 200 mg  200 mg Oral TID    hydrALAZINE (Apresoline) tab 25 mg  25 mg Oral TID    HYDROcodone-acetaminophen (Norco)  MG per tab 1 tablet  1 tablet Oral Q6H PRN    lamoTRIgine (LaMICtal) tab  100 mg  100 mg Oral Daily    losartan (Cozaar) tab 100 mg  100 mg Oral Daily    NIFEdipine ER (Procardia-XL) 24 hr tab 30 mg  30 mg Oral Daily    ondansetron (Zofran-ODT) disintegrating tab 4 mg  4 mg Oral Q8H PRN    sevelamer carbonate (Renvela) tab 2,400 mg  2,400 mg Oral TID CC    tamsulosin (Flomax) cap 0.4 mg  0.4 mg Oral Daily    amphetamine-dextroamphetamine (Adderall) tab 10 mg  10 mg Oral BID@0800,1200    acetaminophen (Tylenol Extra Strength) tab 500 mg  500 mg Oral Q4H PRN    ondansetron (Zofran) 4 MG/2ML injection 4 mg  4 mg Intravenous Q6H PRN    prochlorperazine (Compazine) 10 MG/2ML injection 5 mg  5 mg Intravenous Q8H PRN    hydrocortisone (Anusol-HC) 25 MG rectal suppository 25 mg  25 mg Rectal BID    sodium chloride 0.9 % IV bolus 100 mL  100 mL Intravenous Q30 Min PRN    And    albumin human (Albumin) 25% injection 25 g  25 g Intravenous PRN Dialysis    HYDROmorphone (Dilaudid) 1 MG/ML injection 0.2 mg  0.2 mg Intravenous Q4H PRN    hydrALAzine (Apresoline) 20 mg/mL injection 5 mg  5 mg Intravenous Q6H PRN       LABS     Lab Results   Component Value Date    WBC 6.7 03/10/2025    HGB 7.8 03/10/2025    HCT 22.7 03/10/2025    .0 03/10/2025    CREATSERUM 8.15 03/10/2025    BUN 56 03/10/2025     03/10/2025    K 4.5 03/10/2025     03/10/2025    CO2 24.0 03/10/2025    GLU 97 03/10/2025    CA 8.6 03/10/2025       IMAGING   All imaging studies personally reviewed.    CT ABDOMEN+PELVIS(CONTRAST ONLY)(CPT=74177)   Final Result   PROCEDURE:  CT ABDOMEN+PELVIS (CONTRAST ONLY) (CPT=74177)       COMPARISON:  [place] , CT, CT ABDOMEN+PELVIS(CONTRAST ONLY)(CPT=74177),    3/05/2025, 12:59 PM.  [place], CT, CT ABDOMEN+PELVIS(CPT=74176),    1/06/2025, 6:25 AM.  [place], CT, CT ABDOMEN+PELVIS(CONTRAST    ONLY)(CPT=74177), 12/25/2024, 5:09 PM.  [place],    CT, CT ABDOMEN+PELVIS(CPT=74176), 11/16/2024, 2:33 PM.       INDICATIONS:  rectal bleeding, abdominal pain       TECHNIQUE:  CT scanning  was performed from the dome of the diaphragm to    the pubic symphysis with non-ionic intravenous contrast material. Post    contrast coronal MPR imaging was performed.  Dose reduction techniques    were used. Dose information is    transmitted to the ACR (American College of Radiology) NRDR (National    Radiology Data Registry) which includes the Dose Index Registry.       PATIENT STATED HISTORY:(As transcribed by Technologist)  Mid to lower    bilateral abdominal pain and rectal bleeding.        CONTRAST USED:  100cc of Isovue 370       FINDINGS:     LIVER:  No enlargement, atrophy, abnormal density, or significant focal    lesion.     BILIARY:  No visible dilatation or calcification.     PANCREAS:  No lesion, fluid collection, ductal dilatation, or atrophy.     SPLEEN:  No enlargement or focal lesion.     KIDNEYS:  No mass, obstruction, or calcification.  Tiny cyst right midpole    measures 4 mm.  Tiny cyst in the left midpole measures 5 mm.    ADRENALS:  No mass or enlargement.     AORTA/VASCULAR:  No aneurysm or dissection.     RETROPERITONEUM:  No mass or adenopathy.     BOWEL/MESENTERY:  There is diverticular disease throughout the entire    colon.  There is no evidence of acute diverticulitis.  The appendix is    unremarkable.  Visualized small bowel is within normal limits.   ABDOMINAL WALL:  No mass or hernia.     URINARY BLADDER:  Urinary bladder demonstrates wall thickening which is    diffuse could be seen in cystitis and correlation with urinalysis is    recommended.   PELVIC NODES:  No adenopathy.     PELVIC ORGANS:  No visible mass.  Pelvic organs appropriate for patient    age.     BONES:  No bony lesion or fracture.  Moderate degenerative changes at the    lower lumbar spine.   LUNG BASES:  There is small pleural effusion and/or pleural thickening    involving the right mid and lower lung which is chronic.   OTHER:  Negative.                           =====   CONCLUSION:         1. Diverticulosis  throughout the colon without ends of diverticulitis.         2.  Continued bladder wall thickening can be seen in cystitis.       3. Chronic pleural thickening and trace effusion at the right lung base.           LOCATION:  [facility]           Dictated by (CST): Jaycob Cassidy MD on 3/08/2025 at 11:29 PM        Finalized by (CST): Jaycob Cassidy MD on 3/08/2025 at 11:33 PM               ASSESSMENT & PLAN   43 yo M with history of ESRD on iHD MWF via LUE AVF, HTN, severe MR s/p MVR x 2, HFpEF, DVT/PE, TIA, MGUS, bipolar disorder, recurrent GI bleed, diffuse body pains presented with rectal bleeding.      ESRD:  -- plan HD today, UF as tolerated, HD directly managed     Anemia:  -- in the setting of bleeding  -- epo with HD     MBD:  -- sevelamer 2400 TID AC  -- calcium acetate 667 TIDAC  -- low phos diet     HTN:  -- coreg and losartan pre-HD  -- clonidine should be continued BID to avoid rebound  -- takes remaining antihypertensives after HD unless SBP>180     LUE AVF:  -- L arm precautions     Rectal bleeding:  -- colonoscopy with pan-diverticulosis, hemorrhoids and polyps     Ok to discharge from nephrology after HD if ok w other services  D/w RN.    Thank you for allowing me to participate in the care of this patient. Please do not hesitate to call with questions or concerns.       Samaria Nava MD  [facility]

## 2025-03-10 NOTE — PROGRESS NOTES
[facility]   part of [facility]    Progress Note    Godwin Fonseca Patient Status:  Inpatient    1980 MRN [id number]   Location [facility] 4NW-A Attending Miguel Garsia,    Hosp Day # 1 PCP Ardian Small MD     Subjective:  Godwin Fonseca is a(n) 44 year old male.  No further GI bleeding    Objective:  Blood pressure (!) 147/92, pulse 93, temperature 97.6 °F (36.4 °C), resp. rate 18, height 74\", weight 251 lb 1.6 oz (113.9 kg), SpO2 99%.  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 (HCC)    Chronic non-seasonal allergic rhinitis    Chronic sinusitis, unspecified location    ADHD (attention deficit hyperactivity disorder), inattentive type    Bipolar depression (HCC)    Essential hypertension    Mild persistent asthma without complication (HCC)    CKD (chronic kidney disease) stage 3, GFR 30-59 ml/min (HCC)    Self-inflicted injury    Bipolar disorder in partial remission, most recent episode unspecified type (HCC)    Family history of prostate cancer    Fatigue, unspecified type    Alkaline phosphatase elevation    Hyperlipidemia, mixed    Low serum HDL    Spinal stenosis of lumbar region with neurogenic claudication    Prolapsed lumbar disc    Status post lumbar surgery    Cauda equina syndrome (HCC)    Lumbar disc prolapse with compression radiculopathy    Syncope and collapse    Hypokalemia    Orthostatic hypotension    Hypertension, unspecified type    Weight loss    Chest pain, unspecified type    History of mitral valve stenosis    Acute pericarditis, unspecified type  ([facility])    Cervical radiculopathy    Elevated blood protein    IgG monoclonal protein disorder    MGUS (monoclonal gammopathy of unknown significance)    Hypertensive urgency    Bipolar 2 disorder ([facility])    Employment problem    Stress    Anxiety disorder, unspecified type    Weakness of left lower extremity    Rectal bleeding    Paresthesia of foot, bilateral    Obesity (BMI 30-39.9)    TIA (transient ischemic attack)    TMJ dysfunction    Inverted papilloma of nasal cavity    Type 2 diabetes mellitus with stage 3b chronic kidney disease, without long-term current use of insulin ([facility])    Acute kidney injury    Metabolic acidosis    Hyperglycemia    Chronic kidney disease, unspecified CKD stage    Abdominal distension    SOB (shortness of breath)    Hypertensive crisis    Acute on chronic congestive heart failure, unspecified heart failure type ([facility])    Acute congestive heart failure ([facility])    Acute congestive heart failure, unspecified heart failure type ([facility])    Acute on chronic diastolic congestive heart failure ([facility])    Stage 4 chronic kidney disease ([facility])    ROBERT (acute kidney injury)    Abdominal pain, left lower quadrant    Hypertensive emergency    Acute chest pain    Acute on chronic renal insufficiency    Chronic abdominal pain    Nonintractable headache, unspecified chronicity pattern, unspecified headache type    Chronic right shoulder pain    HCAP (healthcare-associated pneumonia)    Acute intractable headache, unspecified headache type    ESRD (end stage renal disease) on dialysis ([facility])    Diarrhea, unspecified type    Primary hypertension    Dyspnea, unspecified type    Abdominal pain, acute    ESRD on dialysis ([facility])    Fluid overload    ESRD needing dialysis ([facility])    COVID-19 virus infection    Hypotension, unspecified hypotension type    Anemia    Acute renal failure (ARF)    Neck pain    Acute nonintractable headache, unspecified headache type    GI bleed    Chronic kidney disease with end stage renal  failure on dialysis (HCC)    Lower abdominal pain    ESRD (end stage renal disease) (HCC)    Resistant hypertension    Community acquired pneumonia of right lower lobe of lung    Acute renal failure with acute renal cortical necrosis superimposed on stage 4 chronic kidney disease (HCC)    Acute renal failure, unspecified acute renal failure type    Hypervolemia, unspecified hypervolemia type    End stage renal disease on dialysis (HCC)    Acute respiratory failure with hypoxia (HCC)    Stage 5 chronic kidney disease on chronic dialysis (HCC)    Anemia, unspecified type    Hyperkalemia    Hemodialysis catheter infection, initial encounter    Type 2 diabetes mellitus with chronic kidney disease on chronic dialysis, without long-term current use of insulin (HCC)    Coronary artery disease involving native coronary artery of native heart without angina pectoris    Pneumonia of right lower lobe due to infectious organism    Expressive aphasia    Uncontrolled hypertension    Bipolar disorder with moderate depression (HCC)    BRBPR (bright red blood per rectum)    Weakness    Nosebleed    ESRD on hemodialysis (HCC)    Acute colitis    Chest pain of uncertain etiology    Medically noncompliant    Dialysis patient, noncompliant    History of lower GI bleeding    Fever, unspecified fever cause    Nosocomial pneumonia    Nausea and vomiting in adult    Anemia due to chronic kidney disease, on chronic dialysis (HCC)    Chronic renal failure (CRF), stage 5 (HCC)    Diverticulosis of colon with hemorrhage       43 y/o with rectal bleeding that is hemorrhoidal  Had colonoscopy yesterday with 3 polyps removed.    Plan:  With no further GI bleeding, ok to discharge home  Instructed pt on Preparation H suppositories otc  He already has appointment with surgery eval of possible hemorrhoidectomy.  Keep this appointment.    Joe Salinas MD  3/10/2025  9:23 AM

## 2025-03-10 NOTE — PLAN OF CARE
A&Ox4, RA, VSS, afebrile. Pt c/o migraines, PRN pain meds per MAR. Pt also c/o some abdominal cramping, had 1 formed BM, no blood noted. Pt tolerating clear liquid diet. Scheduled for dialysis 3/10. Pt is resting in bed w/ call light in reach, safety precautions in place.    Problem: GASTROINTESTINAL - ADULT  Goal: Minimal or absence of nausea and vomiting  Description: INTERVENTIONS:  - Maintain adequate hydration with IV or PO as ordered and tolerated  - Nasogastric tube to low intermittent suction as ordered  - Evaluate effectiveness of ordered antiemetic medications  - Provide nonpharmacologic comfort measures as appropriate  - Advance diet as tolerated, if ordered  - Obtain nutritional consult as needed  - Evaluate fluid balance  Outcome: Progressing  Goal: Maintains or returns to baseline bowel function  Description: INTERVENTIONS:  - Assess bowel function  - Maintain adequate hydration with IV or PO as ordered and tolerated  - Evaluate effectiveness of GI medications  - Encourage mobilization and activity  - Obtain nutritional consult as needed  - Establish a toileting routine/schedule  - Consider collaborating with pharmacy to review patient's medication profile  Outcome: Progressing     Problem: GENITOURINARY - ADULT  Goal: Absence of urinary retention  Description: INTERVENTIONS:  - Assess patient’s ability to void and empty bladder  - Monitor intake/output and perform bladder scan as needed  - Follow urinary retention protocol/standard of care  - Consider collaborating with pharmacy to review patient's medication profile  - Implement strategies to promote bladder emptying  Outcome: Progressing     Problem: METABOLIC/FLUID AND ELECTROLYTES - ADULT  Goal: Glucose maintained within prescribed range  Description: INTERVENTIONS:  - Monitor Blood Glucose as ordered  - Assess for signs and symptoms of hyperglycemia and hypoglycemia  - Administer ordered medications to maintain glucose within target range  -  Assess barriers to adequate nutritional intake and initiate nutrition consult as needed  - Instruct patient on self management of diabetes  Outcome: Progressing  Goal: Electrolytes maintained within normal limits  Description: INTERVENTIONS:  - Monitor labs and rhythm and assess patient for signs and symptoms of electrolyte imbalances  - Administer electrolyte replacement as ordered  - Monitor response to electrolyte replacements, including rhythm and repeat lab results as appropriate  - Fluid restriction as ordered  - Instruct patient on fluid and nutrition restrictions as appropriate  Outcome: Progressing  Goal: Hemodynamic stability and optimal renal function maintained  Description: INTERVENTIONS:  - Monitor labs and assess for signs and symptoms of volume excess or deficit  - Monitor intake, output and patient weight  - Monitor urine specific gravity, serum osmolarity and serum sodium as indicated or ordered  - Monitor response to interventions for patient's volume status, including labs, urine output, blood pressure (other measures as available)  - Encourage oral intake as appropriate  - Instruct patient on fluid and nutrition restrictions as appropriate  Outcome: Progressing

## 2025-03-10 NOTE — PLAN OF CARE
Patient is A/O x4. VSS. Afebrile. Room air. Complaints of pain; PRN dilaudid given Q4.  Ambulatory. Voids. Regular diet; tolerating well. HD scheduled for today. All medications given per MAR. PIV saline locked. Safety precautions in place. Call light within reach. HD completed, 3000mL removed.  Patient has been cleared for discharge.  Discharge instructions were reviewed with patient.    Problem: GASTROINTESTINAL - ADULT  Goal: Minimal or absence of nausea and vomiting  Description: INTERVENTIONS:  - Maintain adequate hydration with IV or PO as ordered and tolerated  - Nasogastric tube to low intermittent suction as ordered  - Evaluate effectiveness of ordered antiemetic medications  - Provide nonpharmacologic comfort measures as appropriate  - Advance diet as tolerated, if ordered  - Obtain nutritional consult as needed  - Evaluate fluid balance  Outcome: Progressing  Goal: Maintains or returns to baseline bowel function  Description: INTERVENTIONS:  - Assess bowel function  - Maintain adequate hydration with IV or PO as ordered and tolerated  - Evaluate effectiveness of GI medications  - Encourage mobilization and activity  - Obtain nutritional consult as needed  - Establish a toileting routine/schedule  - Consider collaborating with pharmacy to review patient's medication profile  Outcome: Progressing     Problem: GENITOURINARY - ADULT  Goal: Absence of urinary retention  Description: INTERVENTIONS:  - Assess patient’s ability to void and empty bladder  - Monitor intake/output and perform bladder scan as needed  - Follow urinary retention protocol/standard of care  - Consider collaborating with pharmacy to review patient's medication profile  - Implement strategies to promote bladder emptying  Outcome: Progressing     Problem: METABOLIC/FLUID AND ELECTROLYTES - ADULT  Goal: Glucose maintained within prescribed range  Description: INTERVENTIONS:  - Monitor Blood Glucose as ordered  - Assess for signs and symptoms  of hyperglycemia and hypoglycemia  - Administer ordered medications to maintain glucose within target range  - Assess barriers to adequate nutritional intake and initiate nutrition consult as needed  - Instruct patient on self management of diabetes  Outcome: Progressing  Goal: Electrolytes maintained within normal limits  Description: INTERVENTIONS:  - Monitor labs and rhythm and assess patient for signs and symptoms of electrolyte imbalances  - Administer electrolyte replacement as ordered  - Monitor response to electrolyte replacements, including rhythm and repeat lab results as appropriate  - Fluid restriction as ordered  - Instruct patient on fluid and nutrition restrictions as appropriate  Outcome: Progressing  Goal: Hemodynamic stability and optimal renal function maintained  Description: INTERVENTIONS:  - Monitor labs and assess for signs and symptoms of volume excess or deficit  - Monitor intake, output and patient weight  - Monitor urine specific gravity, serum osmolarity and serum sodium as indicated or ordered  - Monitor response to interventions for patient's volume status, including labs, urine output, blood pressure (other measures as available)  - Encourage oral intake as appropriate  - Instruct patient on fluid and nutrition restrictions as appropriate  Outcome: Progressing

## 2025-03-10 NOTE — DISCHARGE SUMMARY
[facility] Hospitalist Discharge Summary     Patient ID:  Godwin Fonseca  44 year old  4/12/1980    Admit date: 3/8/2025  Discharge date and time:  3/10/25  Attending Physician: Miguel Garsia DO   Primary Care Physician: Adrian Small MD   Discharge Diagnoses: Diverticulosis of colon with hemorrhage [K57.31]  Rectal bleeding [K62.5]  Anemia due to chronic kidney disease, on chronic dialysis (HCC) [N18.6, D63.1, Z99.2]  Chronic renal failure (CRF), stage 5 (HCC) [N18.5]    Discharge Condition: Stable    Disposition:  Home    Follow up:   -GI as advised     Hospital Course:  Patient is a 44 year old male with history of ESRD on MWF HD, hemorrhoids, diverticulosis, hypertension and chronic anemia presented for [facility]BPR. Recently discharged yesterday, noted that he went home and had about 3 episodes of bright red rectal bleeding with associated abdominal pain. Pain very generalized to lower abdomen. Had very little po intake during this time. Admission work up with CT abdomen/ pelvis showing diverticulosis but no evidence of diverticulitis. Admitted for further evaluation     #Rectal bleeding  #Chronic anemia  -Colonoscopy completed  -No further bleeding since  -Tolerating diet  -Preparation H suppositories per GI  -Follow-up outpatient, cleared for DC this afternoon     #ESRD  -nephro on consult for MWF HD management     #HTN  -resume home antihypertensives     #Bipolar  #HFpEF  -resume home meds accordingly     Dispo: Tolerating diet, cleared by GI for discharge, H/H stable.  Plan for discharge today after hemodialysis session.  Discussed with patient and bedside RN.    Exam on Day of DC:  /77 (BP Location: Right arm)   Pulse 92   Temp 97.9 °F (36.6 °C) (Oral)   Resp 18   Ht 6' 2\" (1.88 m)   Wt 251 lb 1.6 oz (113.9 kg)   SpO2 96%   BMI 32.24 kg/m²   General: No apparent distress.  Alert and oriented.  HEENT:  EOMI, PERRLA.  Pulm: Clear breath sounds bilaterally.  Normal respiratory effort.  CV: Regular rate and  rhythm, no murmur.   Abd: Soft, nontender, nondistended.   MSK: Moving extremities, no obvious deformities.    Skin: No lesions or rashes.  Neuro: No obvious focal deficits.    I as the attending physician reconciled the current and discharge medications on day of discharge.     Current Discharge Medication List        START taking these medications    Details   polyethylene glycol, PEG 3350-KCl-NaBcb-NaCl-NaSulf, 236 g Oral Recon Soln Take 4,000 mL by mouth once for 1 dose.           CONTINUE these medications which have NOT CHANGED    Details   HYDROcodone-acetaminophen  MG Oral Tab Take 1 tablet by mouth every 6 (six) hours as needed for Pain.      hydrALAZINE 25 MG Oral Tab Take 1 tablet (25 mg total) by mouth 3 (three) times daily.      cloNIDine 0.1 MG Oral Tab Take 1 tablet (0.1 mg total) by mouth 2 (two) times daily.      NIFEdipine ER 30 MG Oral Tablet 24 Hr Take 1 tablet (30 mg total) by mouth daily.      sevelamer carbonate 800 MG Oral Tab Take 3 tablets (2,400 mg total) by mouth 3 (three) times daily with meals.      calcium acetate 667 MG Oral Cap Take 1 capsule (667 mg total) by mouth with breakfast, with lunch, and with evening meal.      !! ARIPiprazole 5 MG Oral Tab Take 1 tablet (5 mg total) by mouth daily. Take 5mg (1 tablet) by mouth daily. Take each dose with 1 tablet of 10mg aripiprazole for a total daily dose of 15mg aripiprazole.      !! ARIPiprazole 10 MG Oral Tab Take 1 tablet (10 mg total) by mouth daily. Take 10mg (1 tablet) by mouth daily. Take each dose with 1 tablet of 5mg aripiprazole for a total daily dose of 15mg aripiprazole.      ergocalciferol 1.25 MG (94098 UT) Oral Cap Take 1 capsule (50,000 Units total) by mouth once a week.      losartan 100 MG Oral Tab Take 1 tablet (100 mg total) by mouth daily.      ondansetron 4 MG Oral Tablet Dispersible Take 1 tablet (4 mg total) by mouth every 8 (eight) hours as needed for Nausea.      gabapentin 100 MG Oral Cap Take 2 capsules  (200 mg total) by mouth 3 (three) times daily.      VYVANSE 60 MG Oral Cap Take 1 capsule (60 mg total) by mouth every morning.      lamoTRIgine 100 MG Oral Tab Take 1 tablet (100 mg total) by mouth daily.      albuterol 108 (90 Base) MCG/ACT Inhalation Aero Soln Inhale 2 puffs into the lungs every 6 (six) hours as needed for Wheezing.      tamsulosin 0.4 MG Oral Cap Take 1 capsule (0.4 mg total) by mouth daily.      buPROPion  MG Oral Tablet 24 Hr Take 1 tablet (150 mg total) by mouth daily.      FLUoxetine HCl 40 MG Oral Cap Take 1 capsule (40 mg total) by mouth daily.      carvedilol 25 MG Oral Tab Take 1 tablet (25 mg total) by mouth in the morning and 1 tablet (25 mg total) in the evening. Take with meals.      Fluticasone Propionate 50 MCG/ACT Nasal Suspension SPRAY ONCE INTO EACH NOSTRIL BID PRN       !! - Potential duplicate medications found. Please discuss with provider.          Activity: activity as tolerated  Diet: renal diet  Wound Care: as directed  Code Status: Full Code    Total time coordinating care for discharge: Greater than 32 minutes    DO Kevin Peck [facility] Hospitalist

## 2025-03-11 NOTE — PAYOR COMM NOTE
--------------  ADMISSION REVIEW     Payor: [employer]  Subscriber #:  [id number]  Authorization Number: [id number]    Admit date: 3/9/25  Admit time: 12:54 AM       ED Provider Notes        History   HPI  44-year-old male who has experienced recurrent episodes of rectal bleeding. He has undergone four transfusions in the past, with the most recent transfusion occurring a few months ago. He reports having dialysis last Friday and was doing well at that time, although his hemoglobin was noted to be low at 8. He denies any significant belly pain. The patient describes having bloody bowel movements, with the most recent episode occurring about an hour ago. He estimates having approximately 5 bloody bowel movements today. He experiences a crampy feeling followed by passing clots.  He states some in the past this has been due to a diverticular bleed    ED Triage Vitals [03/08/25 2124]   BP (!) 185/105   Pulse 100   Resp 20   Temp 98.3 °F (36.8 °C)   Temp src Oral   SpO2 99 %   O2 Device None (Room air)   Vital Signs  BP: (!) 178/124  Pulse: 102  Resp: 20  Temp: 98 °F (36.7 °C)  Temp src: Oral    Oxygen Therapy  SpO2: 99 %  O2 Device: None (Room air)    HEENT: Pupils equal react to light extraocular muscles intact no scleral icterus, mucous membranes are moist, there is no erythema or exudate in the posterior pharynx  Neck: Supple no JVD no lymphadenopathy no meningismus no carotid bruit  CV: Regular rate and rhythm loud diastolic murmur  Respiratory: Clear to auscultation bilaterally no crackles no wheezes no accessory muscle use  Abdomen: Left lower quadrant tenderness on exam no rebound no guarding  no hepatosplenomegaly bowel sounds are present , no pulsatile mass  Extremities: No clubbing cyanosis or edema 2+ distal pulses.  Neuro: Cranial nerves II through XII intact with no gross focal sensory or motor abnormality.    Labs Reviewed   COMP METABOLIC PANEL (14) - Abnormal; Notable for the following  components:       Result Value    BUN 51 (*)     Creatinine 7.66 (*)     Calculated Osmolality 308 (*)     eGFR-Cr 8 (*)     All other components within normal limits   CBC WITH DIFFERENTIAL WITH PLATELET - Abnormal; Notable for the following components:    RBC 2.48 (*)     HGB 8.1 (*)     HCT 24.1 (*)     Monocyte Absolute 1.17 (*)    CT ABDOMEN+PELVIS(CONTRAST ONLY)(CPT=74177)  Result Date: 3/8/2025  CONCLUSION:   1. Diverticulosis throughout the colon without ends of diverticulitis.   2.  Continued bladder wall thickening can be seen in cystitis.  3. Chronic pleural thickening and trace effusion at the right lung base.   LOCATION:  [facility]   Dictated by (CST): Jaycob Cassidy MD on 3/08/2025 at 11:29 PM     Finalized by (CST): Jaycob Cassidy MD on 3/08/2025 at 11:33 PM      Admission disposition: 3/8/2025 11:58 PM    Disposition and Plan     Clinical Impression:  1. Rectal bleeding    2. Anemia due to chronic kidney disease, on chronic dialysis (HCC)    3. Chronic renal failure (CRF), stage 5 (HCC)    4. Diverticulosis of colon with hemorrhage       Disposition:  Admit  3/8/2025 11:58 pm       Godwin Fonseca #[id number]  Admission Info: Inpatient (Adm: 03/08/25)  Hospital Account: [id number]  Description: 44 year old M Primary Service: Medical Unit Info: [id number] MEDO     History and Physical    H&P signed by Miguel Garsia DO at 3/9/2025  1:37 PM    Author: Miguel Garsia DO Service: Hospitalist Author Type: Physician   Filed: 3/9/2025  1:37 PM Date of Service: 3/9/2025  9:29 AM Status: Signed   : Miguel Garsia DO (Physician)      [facility] Hospitalist History and Physical           Chief Complaint   Patient presents with    GI Bleeding         PCP: Adrian Small MD     History of Present Illness: Patient is a 44 year old male with history of ESRD on MWF HD, hemorrhoids, diverticulosis, hypertension and chronic anemia presented for BRBPR. Recently discharged yesterday, noted that he went home and  had about 3 episodes of bright red rectal bleeding with associated abdominal pain. Pain very generalized to lower abdomen. Had very little po intake during this time. Admission work up with CT abdomen/ pelvis showing diverticulosis but no evidence of diverticulitis. Admitted for further evaluation.       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[facility])    Congestive heart disease ([facility])    COPD (chronic obstructive pulmonary disease) ([facility])    Coronary atherosclerosis    Deep vein thrombosis ([facility])     at age 17 R/T cast    Depression    Diabetes ([facility])    Dialysis patient    Diverticulosis of large intestine    Essential hypertension     3/21 echo: severe concentric LVH with normal EF and no MR or pHTN    Extrinsic asthma, unspecified    Heart attack ([facility])     2016- angiogram- no intervention    Heart valve disease     mitral valve repair in 1994/    High blood pressure    High cholesterol    History of blood transfusion    History of mitral valve repair    Hyperlipidemia    Low back pain     tight and stiff after sweeping and mopping    LVH (left ventricular hypertrophy)    Migraines    Mixed hyperlipidemia      HDL 38 LDL 97 VLDL 57     Monoclonal gammopathy     IgG kappa     Muscle weakness    MVP (mitral valve prolapse)     Repair 1994 at [place]; echoes as recently as 3/21 show mild or trivial MR and no stenosis    Neuropathy    Osteoarthritis     hip ,knees    Pneumonia due to organism    Pulmonary embolism ([facility])    Renal disorder    Stroke ([facility])    TIA (transient ischemic attack)     Initial history of left-sided weakness and slurred speech. (+) cocaine. MRI of the brain, CT angiogram of the head and neck, and 2D echo are all unremarkable.     TMJ  (dislocation of temporomandibular joint)    Troponin level elevated     Trop 60 60 47 with TIA and no CP: Lexiscan negative with EF 51    Visual impairment            Past Surgical History:   Procedure Laterality Date    Av fistula revision, open Left      Cabg        Colonoscopy N/A 03/26/2023     Procedure: COLONOSCOPY;  Surgeon: Heath Vu MD;  Location:  ENDOSCOPY    Colonoscopy N/A 12/30/2023     Procedure: COLONOSCOPY with cold snare polypectomy and forcep polypectomy;  Surgeon: uOsmane Suarez MD;  Location:  ENDOSCOPY    Colonoscopy & polypectomy   2019    Egd   2019     Duodenitis. Biopsied. EUS for weight loss was negative    Heart surgery        Hernia surgery   08/17/2022     Dr Barnes    Laminectomy,>2 sgmt,lumbar   09/06/2018     L4-L5 Decomp Discectomy ROEM L4-L5    Mitralplasty w cp bypass   1994     Christiano: Repair    Repair rotator cuff,chronic Left       torn and had a ruptured bicep    Sinus surgery          Spine surgery procedure unlisted        Valve repair   1994     mitral valve      Social History            Tobacco Use    Smoking status: Former       Current packs/day: 0.00       Average packs/day: 1 pack/day for 27.0 years (27.0 ttl pk-yrs)       Types: Cigarettes       Start date: 2/28/1995       Quit date: 2/28/2022       Years since quitting: 3.0       Passive exposure: Never    Smokeless tobacco: Never   Substance Use Topics    Alcohol use: No            Family History   Problem Relation Age of Onset    Hypertension Father      Alcohol and Other Disorders Associated Father      Substance Abuse Father           cocaine    Dementia Father      Cancer Father           lung    Diabetes Mother      Cancer Mother           multiple myeloma    Hypertension Mother      Anxiety Maternal Aunt      Depression Maternal Aunt      Anxiety Maternal Aunt      Depression Maternal Aunt      Bipolar Disorder Maternal Aunt      Diabetes Maternal Grandmother      Hypertension Maternal Grandmother       Cancer Maternal Grandfather           stomach cancer    Diabetes Maternal Grandfather      Hypertension Maternal Grandfather      Alcohol and Other Disorders Associated Maternal Grandfather      Hypertension Paternal Grandmother      Hypertension Paternal Grandfather      Cancer Sister           uterine and ovarian    Hypertension Sister      Cancer Maternal Uncle           lung    Cancer Paternal Aunt           throat         [Allergies]     [Allergies]       Allergen Reactions    Hydrochlorothiazide RASH and HIVES    Fentanyl ITCHING and NAUSEA ONLY        Review of Systems  Comprehensive ROS reviewed and negative except for what is stated in HPI.       OBJECTIVE:  BP (!) 158/105   Pulse 92   Temp 97.3 °F (36.3 °C) (Oral)   Resp 20   Ht 6' 2\" (1.88 m)   Wt 240 lb (108.9 kg)   SpO2 98%   BMI 30.81 kg/m²   General: No apparent distress.  Alert and oriented.  HEENT:  EOMI, PERRLA.  Pulm: Clear breath sounds bilaterally.  Normal respiratory effort.  CV: Regular rate and rhythm, no murmur.   Abd: Soft, nontender, nondistended.   MSK: Moving extremities, no obvious deformities.    Skin: No lesions or rashes.  Neuro: No obvious focal deficits.     Data Review:    LABS:         Lab Results   Component Value Date     WBC 8.4 03/09/2025     HGB 8.0 03/09/2025     HCT 24.4 03/09/2025     .0 03/09/2025     CREATSERUM 7.81 03/09/2025     BUN 56 03/09/2025      03/09/2025     K 4.2 03/09/2025      03/09/2025     CO2 26.0 03/09/2025     GLU 89 03/09/2025     CA 8.9 03/09/2025     ALB 4.0 03/09/2025     ALKPHO 92 03/09/2025     BILT 0.4 03/09/2025     TP 6.6 03/09/2025     AST 30 03/09/2025     ALT 32 03/09/2025     PTT 27.8 03/08/2025     INR 1.07 03/08/2025     PTP 13.7 03/08/2025     MG 2.0 03/09/2025         All lab work and imaging personally reviewed.     Assessment/Plan:      Assessment/ Plan:      #Rectal bleeding  #Chronic anemia  -suspect this is likely related to chronic h/o  hemorrhoid  -christianne completed this morning  -plan for colonoscopy this afternoon. D/w GI. Will follow.   -advance diet after procedure   -monitor hgb, hemodynamics, transfuse if Hgb <7     #ESRD  -nephro on consult for MWF HD management     #HTN  -resume home antihypertensives     #Bipolar  #HFpEF  -resume home meds accordingly      DVT ppx: SCDs  Diet: NPO , renal   Disposition: inpt              3/9/25  GASTROENTEROLOGY  Reason for Consultation:  Rectal bleeding     Godwin Fonseca is a a(n) 44 year old male.      Just discharged yesterday       Capsule w/ Dr Alfaro 10/2024 --no sb bleeding.  Flex sig 3/2024 w Danny, punctate blood on large hemorrhoids, nl sigmoid.   12/2023 cscope w/ Dr santana w/ polyps and diverticulosis     Seen by Dr Salinas 1/19/25 for chronic aneia, bleeding, STEMI, coronovirus and he rec'ed serial hg but no further endoscopy     Admit several days ago w/ abd pain, n/v/d. Imporved w/ conservative care     3/8 went home.  By 1 pm to 8 pm had 3 episodes of bright red blood per rectum, still some intermittent lower abd pain.  States passed stools as well.     Did eat lunch after bleeding started ~ 3 pm, bleeding after that and again 8 pm but no bleeding since     States similar symptoms in the past     Repeat CT in er w/ diverticulosis and bladder thickening, no diverticulitis     Denies chest pain, n/v, sob, neurologic changes, orthostatic symtpoms etc     States ROS otherwise negative/unchanged     Denies melena     Risk of colonoscopy including prep, sedation, bleeding, perforation, infection, miss rate or issues with accuracy, etc and possible morbidity/mortalty discussed.  We discussed risk, benefit, alternatives, limitations as well as not having the procedures.  All questions answered, pt demonstrated understanding.     negative for - appetite loss, gas/bloating, heartburn, hematemesis, melena, nausea/vomiting, stool incontinence, or swallowing difficulty/pain   BP (!) 185/119   Pulse  103   Temp 97.8 °F (36.6 °C)   Resp 22   Ht 6' 2\" (1.88 m)   Wt 240 lb (108.9 kg)   SpO2 98%   BMI 30.81 kg/m²      GENERAL: well developed, well nourished, in no apparent distress  HEENT: normocephalic, mucous membranes moist.  EYES: eomi   NECK:non tender, supple  RESPIRATORY: clear to percussion and auscultation  CARDIOVASCULAR: reg rate     ABDOMEN: normal, active bowel sounds, no masses, HSM or tenderness, soft, nondistended, no G/R/R , no pain w/ palpation  RECTAL: light tint of pink liquid/mucous on laverne, no gross blood or pain   EXTREMITIES: no le edema  NEURO:  Oriented x 3   BACK: no CVA tenderness  PSYCH: appropriate for the exam              LAB/IMAGING RESULTS:            Lab Results   Component Value Date     PTT 27.8 03/08/2025     INR 1.07 03/08/2025            Recent Labs   Lab 03/06/25  0556 03/07/25  0551 03/08/25  2214   GLU 97 132* 97   BUN 42* 55* 51*   CREATSERUM 7.33* 8.74* 7.66*   CA 8.8 8.9 8.8    142 142   K 3.4* 3.8 4.1    105 106   CO2 27.0 23.0 24.0                 Recent Labs   Lab 03/05/25  0803 03/05/25  1200 03/07/25  0551 03/07/25  1126 03/08/25  2214   RBC 2.62*  --  2.51*  --  2.48*   HGB 8.3*   < > 7.9*   < > 8.1*   HCT 23.9*  --  23.5*  --  24.1*   MCV 91.2  --  93.6  --  97.2   MCH 31.7  --  31.5  --  32.7   MCHC 34.7  --  33.6  --  33.6   RDW 14.4  --  14.9  --  15.2   NEPRELIM 5.39  --  3.96  --  6.59   WBC 7.9  --  6.4  --  9.8   .0  --  215.0  --  217.0    < > = values in this interval not displayed.                 Recent Labs   Lab 03/04/25  1739 03/05/25  0803 03/06/25  0556 03/07/25  0551 03/08/25  2215   ALKPHO 97  --   --   --  93   BILT 0.4  --   --   --  0.3   TP 7.0  --   --   --  6.7   ALB 3.9   < > 3.8 4.0 3.9   ALT 35  --   --   --  38   AST 33  --   --   --  29    < > = values in this interval not displayed.         No results for input(s): \"JAMIL\", \"LIP\" in the last 168 hours.           ASSESSMENT AND PLAN:   1 rectal bleeding -- hg  stable so far, describes heavier bleeding than what he felt was his hemorrhoids in the past     No bleeding for last ~ 7 hrs.     Has had diverticulosis in the past     Has had rectal bleeding in the past as well     Avm, diverticula, polyp, malignancy, hemorrhoids, other more proximal source etc possible     Risk of benign or self limited process vs more dangerous or even life threatening processes discussed.  --serial hg/follow labs  --PEG prep now  --we discussed doing cscope later today vs ongoing monitoring but doing the prep now regardless in case bleeding becomes heavier         pt demonstrated understanding of risks of morbidity/mortality if diagnoses and/or treatments were delayed balanced against risks of further investigation and/or treatment.        NEPHROLOGY  REASON FOR CONSULT:      ESRD     HISTORY OF PRESENT ILLNESS:      45 yo M with history of ESRD on iHD MWF via LUE AVF, HTN, severe MR s/p MVR x 2, HFpEF, DVT/PE, TIA, MGUS, bipolar disorder, recurrent GI bleed, diffuse body pains presented with rectal bleeding. He was discharged from here after dialysis on 3/7 after being admitted  for nausea/vomiting. He has not been eating much the past 2 days. Underwent colonoscopy today.    Lab Results   Component Value Date     GLU 89 03/09/2025     BUN 56 (H) 03/09/2025     BUNCREA 13.0 03/07/2022     CREATSERUM 7.81 (H) 03/09/2025     ANIONGAP 13 03/09/2025     CA 8.9 03/09/2025     OSMOCALC 311 (H) 03/09/2025     ALKPHO 92 03/09/2025     AST 30 03/09/2025     ALT 32 03/09/2025     BILT 0.4 03/09/2025     TP 6.6 03/09/2025     ALB 4.0 03/09/2025     GLOBULIN 2.6 03/09/2025      03/09/2025     K 4.2 03/09/2025      03/09/2025     CO2 26.0 03/09/2025      Lab Results   Component Value Date     WBC 8.4 03/09/2025     RBC 2.51 (L) 03/09/2025     HGB 8.0 (L) 03/09/2025     HCT 24.4 (L) 03/09/2025     .0 03/09/2025     ASSESSMENT/PLAN:   45 yo M with history of ESRD on iHD MWF via LUE AVF,  HTN, severe MR s/p MVR x 2, HFpEF, DVT/PE, TIA, MGUS, bipolar disorder, recurrent GI bleed, diffuse body pains presented with rectal bleeding.      ESRD:  -- plan HD tomorrow      Anemia:  -- in the setting of bleeding  -- epo with HD     MBD:  -- sevelamer 2400 TID AC  -- calcium acetate 667 TIDAC  -- low phos diet     HTN:  -- coreg and losartan pre-HD  -- clonidine should be continued BID to avoid rebound  -- takes remaining antihypertensives after HD unless SBP>180     LUE AVF:  -- L arm precautions     Rectal bleeding:  -- colonoscopy with pan-diverticulosis, hemorrhoids and polyps       DATE OF DISCHARGE 3/10/25     [facility] Hospitalist Discharge Summary         Admit date: 3/8/2025  Discharge date and time:  3/10/25  Attending Physician: Miguel Garsia DO   Primary Care Physician: Adrian Small MD   Discharge Diagnoses: Diverticulosis of colon with hemorrhage [K57.31]  Rectal bleeding [K62.5]  Anemia due to chronic kidney disease, on chronic dialysis (HCC) [N18.6, D63.1, Z99.2]  Chronic renal failure (CRF), stage 5 (HCC) [N18.5]     Discharge Condition: Stable     Disposition:  Home     Follow up:   -GI as advised      Hospital Course:  Patient is a 44 year old male with history of ESRD on MWF HD, hemorrhoids, diverticulosis, hypertension and chronic anemia presented for BRBPR. Recently discharged yesterday, noted that he went home and had about 3 episodes of bright red rectal bleeding with associated abdominal pain. Pain very generalized to lower abdomen. Had very little po intake during this time. Admission work up with CT abdomen/ pelvis showing diverticulosis but no evidence of diverticulitis. Admitted for further evaluation      #Rectal bleeding  #Chronic anemia  -Colonoscopy completed  -No further bleeding since  -Tolerating diet  -Preparation H suppositories per GI  -Follow-up outpatient, cleared for DC this afternoon     #ESRD  -nephro on consult for MWF HD management     #HTN  -resume home  antihypertensives     #Bipolar  #HFpEF  -resume home meds accordingly      Dispo: Tolerating diet, cleared by GI for discharge, H/H stable.  Plan for discharge today after hemodialysis session.  Discussed with patient and bedside RN.     Exam on Day of DC:  /77 (BP Location: Right arm)   Pulse 92   Temp 97.9 °F (36.6 °C) (Oral)   Resp 18   Ht 6' 2\" (1.88 m)   Wt 251 lb 1.6 oz (113.9 kg)   SpO2 96%   BMI 32.24 kg/m²   General: No apparent distress.  Alert and oriented.  HEENT:  EOMI, PERRLA.  Pulm: Clear breath sounds bilaterally.  Normal respiratory effort.  CV: Regular rate and rhythm, no murmur.   Abd: Soft, nontender, nondistended.   MSK: Moving extremities, no obvious deformities.    Skin: No lesions or rashes.  Neuro: No obvious focal deficits.     Activity: activity as tolerated  Diet: renal diet  Wound Care: as directed  Code Status: Full Code             Vitals (last day) before discharge       Date/Time Temp Pulse Resp BP SpO2 Weight O2 Device O2 Flow Rate (L/min) [facility]    03/10/25 1900 -- 88 -- 131/82 -- -- -- --     03/10/25 1759 -- 92 -- 111/86 -- -- -- --     03/10/25 1146 97.9 °F (36.6 °C) 92 18 143/77 96 % -- None (Room air) --     03/10/25 0900 -- -- -- -- -- 251 lb 1.6 oz (113.9 kg) -- --     03/10/25 0750 -- 93 -- 147/92 99 % -- -- --     03/10/25 0750 97.6 °F (36.4 °C) -- 18 -- -- -- None (Room air) --     03/10/25 0410 97.6 °F (36.4 °C) 90 18 125/80 98 % -- None (Room air) --     03/10/25 0013 97.4 °F (36.3 °C) 88 20 143/86 97 % -- Nasal cannula 2 L/min     03/09/25 2047 97.8 °F (36.6 °C) 88 18 151/96 98 % -- None (Room air) -- LM    03/09/25 1639 -- 98 -- 174/100 99 % -- -- -- MK    03/09/25 1630 97.7 °F (36.5 °C) 102 20 179/100 98 % -- None (Room air) -- TS    03/09/25 1349 97.4 °F (36.3 °C) 83 20 165/98 99 % -- None (Room air) -- EM    03/09/25 1315 -- 92 -- 158/105 98 % -- -- -- HZ    03/09/25 1310 -- 94 -- 165/106 99 % -- -- -- HZ    03/09/25 1305 -- 94 -- 147/103  98 % -- -- -- HZ    03/09/25 1300 -- 96 -- 150/100 98 % -- Nasal cannula 3 L/min HZ    03/09/25 0740 97.3 °F (36.3 °C) 97 20 167/124 98 % -- None (Room air) -- EM    03/09/25 0550 97.5 °F (36.4 °C) 93 -- 188/116 94 % -- -- -- AP    03/09/25 0100 97.8 °F (36.6 °C) -- -- 185/119 -- -- -- -- AP    03/09/25 0019 -- 103 22 -- 98 % -- None (Room air) -- NZ

## 2025-03-14 ENCOUNTER — HOSPITAL ENCOUNTER (INPATIENT)
Facility: HOSPITAL | Age: 45
LOS: 1 days | Discharge: HOME | End: 2025-03-16
Attending: EMERGENCY MEDICINE
Payer: MEDICARE

## 2025-03-14 ENCOUNTER — APPOINTMENT (OUTPATIENT)
Dept: GENERAL RADIOLOGY | Age: 45
DRG: 304 | End: 2025-03-14
Payer: MEDICARE

## 2025-03-14 ENCOUNTER — APPOINTMENT (OUTPATIENT)
Dept: GENERAL RADIOLOGY | Age: 45
End: 2025-03-14
Payer: MEDICARE

## 2025-03-14 ENCOUNTER — HOSPITAL ENCOUNTER (INPATIENT)
Facility: HOSPITAL | Age: 45
LOS: 1 days | Discharge: HOME | DRG: 304 | End: 2025-03-16
Attending: EMERGENCY MEDICINE
Payer: MEDICARE

## 2025-03-14 ENCOUNTER — APPOINTMENT (OUTPATIENT)
Dept: CT IMAGING | Age: 45
End: 2025-03-14
Attending: EMERGENCY MEDICINE
Payer: MEDICARE

## 2025-03-14 ENCOUNTER — APPOINTMENT (OUTPATIENT)
Dept: CT IMAGING | Age: 45
DRG: 304 | End: 2025-03-14
Attending: EMERGENCY MEDICINE
Payer: MEDICARE

## 2025-03-14 DIAGNOSIS — I16.0 HYPERTENSIVE URGENCY: ICD-10-CM

## 2025-03-14 DIAGNOSIS — Z99.2 ESRD NEEDING DIALYSIS (HCC): ICD-10-CM

## 2025-03-14 DIAGNOSIS — D64.9 ANEMIA, UNSPECIFIED TYPE: ICD-10-CM

## 2025-03-14 DIAGNOSIS — E87.70 HYPERVOLEMIA, UNSPECIFIED HYPERVOLEMIA TYPE: Primary | ICD-10-CM

## 2025-03-14 DIAGNOSIS — N18.6 ESRD NEEDING DIALYSIS (HCC): ICD-10-CM

## 2025-03-14 LAB
ALBUMIN SERPL-MCNC: 4 G/DL (ref 3.2–4.8)
ALBUMIN/GLOB SERPL: 1.3 {RATIO} (ref 1–2)
ALP LIVER SERPL-CCNC: 90 U/L
ALT SERPL-CCNC: 65 U/L
ANION GAP SERPL CALC-SCNC: 11 MMOL/L (ref 0–18)
AST SERPL-CCNC: 60 U/L (ref ?–34)
BASOPHILS # BLD AUTO: 0.09 X10(3) UL (ref 0–0.2)
BASOPHILS NFR BLD AUTO: 1.3 %
BILIRUB SERPL-MCNC: 0.4 MG/DL (ref 0.3–1.2)
BUN BLD-MCNC: 62 MG/DL (ref 9–23)
CALCIUM BLD-MCNC: 9 MG/DL (ref 8.7–10.6)
CHLORIDE SERPL-SCNC: 100 MMOL/L (ref 98–112)
CO2 SERPL-SCNC: 27 MMOL/L (ref 21–32)
CREAT BLD-MCNC: 6.01 MG/DL
EGFRCR SERPLBLD CKD-EPI 2021: 11 ML/MIN/1.73M2 (ref 60–?)
EOSINOPHIL # BLD AUTO: 0.25 X10(3) UL (ref 0–0.7)
EOSINOPHIL NFR BLD AUTO: 3.5 %
ERYTHROCYTE [DISTWIDTH] IN BLOOD BY AUTOMATED COUNT: 15.5 %
GLOBULIN PLAS-MCNC: 3.2 G/DL (ref 2–3.5)
GLUCOSE BLD-MCNC: 132 MG/DL (ref 70–99)
GLUCOSE BLD-MCNC: 149 MG/DL (ref 70–99)
HCT VFR BLD AUTO: 24 %
HGB BLD-MCNC: 8.2 G/DL
IMM GRANULOCYTES # BLD AUTO: 0.02 X10(3) UL (ref 0–1)
IMM GRANULOCYTES NFR BLD: 0.3 %
LYMPHOCYTES # BLD AUTO: 1.18 X10(3) UL (ref 1–4)
LYMPHOCYTES NFR BLD AUTO: 16.5 %
MCH RBC QN AUTO: 33.1 PG (ref 26–34)
MCHC RBC AUTO-ENTMCNC: 34.2 G/DL (ref 31–37)
MCV RBC AUTO: 96.8 FL
MONOCYTES # BLD AUTO: 0.96 X10(3) UL (ref 0.1–1)
MONOCYTES NFR BLD AUTO: 13.4 %
NEUTROPHILS # BLD AUTO: 4.67 X10 (3) UL (ref 1.5–7.7)
NEUTROPHILS # BLD AUTO: 4.67 X10(3) UL (ref 1.5–7.7)
NEUTROPHILS NFR BLD AUTO: 65 %
NT-PROBNP SERPL-MCNC: ABNORMAL PG/ML (ref ?–125)
OSMOLALITY SERPL CALC.SUM OF ELEC: 305 MOSM/KG (ref 275–295)
PLATELET # BLD AUTO: 212 10(3)UL (ref 150–450)
POCT INFLUENZA A: NEGATIVE
POCT INFLUENZA B: NEGATIVE
POTASSIUM SERPL-SCNC: 4.1 MMOL/L (ref 3.5–5.1)
PROT SERPL-MCNC: 7.2 G/DL (ref 5.7–8.2)
RBC # BLD AUTO: 2.48 X10(6)UL
SARS-COV-2 RNA RESP QL NAA+PROBE: NOT DETECTED
SODIUM SERPL-SCNC: 138 MMOL/L (ref 136–145)
TROPONIN I SERPL HS-MCNC: 396 NG/L
WBC # BLD AUTO: 7.2 X10(3) UL (ref 4–11)

## 2025-03-14 PROCEDURE — 84484 ASSAY OF TROPONIN QUANT: CPT | Performed by: EMERGENCY MEDICINE

## 2025-03-14 PROCEDURE — 80061 LIPID PANEL: CPT | Performed by: EMERGENCY MEDICINE

## 2025-03-14 PROCEDURE — 87502 INFLUENZA DNA AMP PROBE: CPT | Performed by: EMERGENCY MEDICINE

## 2025-03-14 PROCEDURE — 74177 CT ABD & PELVIS W/CONTRAST: CPT | Performed by: EMERGENCY MEDICINE

## 2025-03-14 PROCEDURE — 85025 COMPLETE CBC W/AUTO DIFF WBC: CPT

## 2025-03-14 PROCEDURE — 96375 TX/PRO/DX INJ NEW DRUG ADDON: CPT

## 2025-03-14 PROCEDURE — 99291 CRITICAL CARE FIRST HOUR: CPT

## 2025-03-14 PROCEDURE — 82962 GLUCOSE BLOOD TEST: CPT

## 2025-03-14 PROCEDURE — 84484 ASSAY OF TROPONIN QUANT: CPT

## 2025-03-14 PROCEDURE — 93005 ELECTROCARDIOGRAM TRACING: CPT

## 2025-03-14 PROCEDURE — 80061 LIPID PANEL: CPT

## 2025-03-14 PROCEDURE — 83880 ASSAY OF NATRIURETIC PEPTIDE: CPT | Performed by: EMERGENCY MEDICINE

## 2025-03-14 PROCEDURE — 96365 THER/PROPH/DIAG IV INF INIT: CPT

## 2025-03-14 PROCEDURE — 87502 INFLUENZA DNA AMP PROBE: CPT

## 2025-03-14 PROCEDURE — 80053 COMPREHEN METABOLIC PANEL: CPT

## 2025-03-14 PROCEDURE — 99285 EMERGENCY DEPT VISIT HI MDM: CPT

## 2025-03-14 PROCEDURE — 83880 ASSAY OF NATRIURETIC PEPTIDE: CPT

## 2025-03-14 PROCEDURE — 96366 THER/PROPH/DIAG IV INF ADDON: CPT

## 2025-03-14 PROCEDURE — 85025 COMPLETE CBC W/AUTO DIFF WBC: CPT | Performed by: EMERGENCY MEDICINE

## 2025-03-14 PROCEDURE — 80053 COMPREHEN METABOLIC PANEL: CPT | Performed by: EMERGENCY MEDICINE

## 2025-03-14 PROCEDURE — 71045 X-RAY EXAM CHEST 1 VIEW: CPT

## 2025-03-14 PROCEDURE — 93010 ELECTROCARDIOGRAM REPORT: CPT

## 2025-03-14 RX ORDER — HYDROMORPHONE HYDROCHLORIDE 1 MG/ML
0.5 INJECTION, SOLUTION INTRAMUSCULAR; INTRAVENOUS; SUBCUTANEOUS ONCE
Status: COMPLETED | OUTPATIENT
Start: 2025-03-14 | End: 2025-03-14

## 2025-03-14 RX ORDER — HYDROMORPHONE HYDROCHLORIDE 1 MG/ML
INJECTION, SOLUTION INTRAMUSCULAR; INTRAVENOUS; SUBCUTANEOUS
Status: DISPENSED
Start: 2025-03-14 | End: 2025-03-15

## 2025-03-14 RX ORDER — NITROGLYCERIN 20 MG/100ML
INJECTION INTRAVENOUS
Status: DISCONTINUED | OUTPATIENT
Start: 2025-03-14 | End: 2025-03-16

## 2025-03-15 LAB
ATRIAL RATE: 103 BPM
CHOLEST SERPL-MCNC: 200 MG/DL (ref ?–200)
HBV SURFACE AG SER-ACNC: 0.4 [IU]/L
HBV SURFACE AG SERPL QL IA: NONREACTIVE
HDLC SERPL-MCNC: 56 MG/DL (ref 40–59)
LDLC SERPL CALC-MCNC: 108 MG/DL (ref ?–100)
NONHDLC SERPL-MCNC: 144 MG/DL (ref ?–130)
P AXIS: -29 DEGREES
P-R INTERVAL: 246 MS
Q-T INTERVAL: 488 MS
QRS DURATION: 98 MS
QTC CALCULATION (BEZET): 639 MS
R AXIS: -15 DEGREES
T AXIS: 143 DEGREES
TRIGL SERPL-MCNC: 211 MG/DL (ref 30–149)
TROPONIN I SERPL HS-MCNC: 367 NG/L
VENTRICULAR RATE: 103 BPM
VLDLC SERPL CALC-MCNC: 36 MG/DL (ref 0–30)

## 2025-03-15 PROCEDURE — 90935 HEMODIALYSIS ONE EVALUATION: CPT | Performed by: INTERNAL MEDICINE

## 2025-03-15 PROCEDURE — 5A1D70Z PERFORMANCE OF URINARY FILTRATION, INTERMITTENT, LESS THAN 6 HOURS PER DAY: ICD-10-PCS | Performed by: INTERNAL MEDICINE

## 2025-03-15 PROCEDURE — 94760 N-INVAS EAR/PLS OXIMETRY 1: CPT

## 2025-03-15 PROCEDURE — 87340 HEPATITIS B SURFACE AG IA: CPT | Performed by: INTERNAL MEDICINE

## 2025-03-15 PROCEDURE — 96376 TX/PRO/DX INJ SAME DRUG ADON: CPT

## 2025-03-15 PROCEDURE — 96375 TX/PRO/DX INJ NEW DRUG ADDON: CPT

## 2025-03-15 PROCEDURE — 84484 ASSAY OF TROPONIN QUANT: CPT | Performed by: HOSPITALIST

## 2025-03-15 RX ORDER — LOSARTAN POTASSIUM 100 MG/1
100 TABLET ORAL DAILY
Status: DISCONTINUED | OUTPATIENT
Start: 2025-03-15 | End: 2025-03-16

## 2025-03-15 RX ORDER — CALCIUM ACETATE 667 MG/1
667 CAPSULE ORAL
Status: DISCONTINUED | OUTPATIENT
Start: 2025-03-15 | End: 2025-03-16

## 2025-03-15 RX ORDER — CARVEDILOL 12.5 MG/1
25 TABLET ORAL 2 TIMES DAILY WITH MEALS
Status: DISCONTINUED | OUTPATIENT
Start: 2025-03-15 | End: 2025-03-16

## 2025-03-15 RX ORDER — CLONIDINE HYDROCHLORIDE 0.1 MG/1
0.1 TABLET ORAL 2 TIMES DAILY
Status: DISCONTINUED | OUTPATIENT
Start: 2025-03-15 | End: 2025-03-16

## 2025-03-15 RX ORDER — ALBUMIN (HUMAN) 12.5 G/50ML
25 SOLUTION INTRAVENOUS
Status: DISCONTINUED | OUTPATIENT
Start: 2025-03-15 | End: 2025-03-16

## 2025-03-15 RX ORDER — HEPARIN SODIUM 5000 [USP'U]/ML
5000 INJECTION, SOLUTION INTRAVENOUS; SUBCUTANEOUS EVERY 8 HOURS SCHEDULED
Status: DISCONTINUED | OUTPATIENT
Start: 2025-03-15 | End: 2025-03-16

## 2025-03-15 RX ORDER — MORPHINE SULFATE 2 MG/ML
2 INJECTION, SOLUTION INTRAMUSCULAR; INTRAVENOUS EVERY 2 HOUR PRN
Status: DISCONTINUED | OUTPATIENT
Start: 2025-03-15 | End: 2025-03-16

## 2025-03-15 RX ORDER — HYDROMORPHONE HYDROCHLORIDE 1 MG/ML
0.5 INJECTION, SOLUTION INTRAMUSCULAR; INTRAVENOUS; SUBCUTANEOUS ONCE
Status: COMPLETED | OUTPATIENT
Start: 2025-03-15 | End: 2025-03-15

## 2025-03-15 RX ORDER — MORPHINE SULFATE 4 MG/ML
4 INJECTION, SOLUTION INTRAMUSCULAR; INTRAVENOUS EVERY 2 HOUR PRN
Status: DISCONTINUED | OUTPATIENT
Start: 2025-03-15 | End: 2025-03-16

## 2025-03-15 RX ORDER — PROCHLORPERAZINE EDISYLATE 5 MG/ML
5 INJECTION INTRAMUSCULAR; INTRAVENOUS EVERY 8 HOURS PRN
Status: DISCONTINUED | OUTPATIENT
Start: 2025-03-15 | End: 2025-03-16

## 2025-03-15 RX ORDER — HYDRALAZINE HYDROCHLORIDE 25 MG/1
25 TABLET, FILM COATED ORAL 3 TIMES DAILY
Status: DISCONTINUED | OUTPATIENT
Start: 2025-03-15 | End: 2025-03-16

## 2025-03-15 RX ORDER — GABAPENTIN 100 MG/1
200 CAPSULE ORAL 3 TIMES DAILY
Status: DISCONTINUED | OUTPATIENT
Start: 2025-03-15 | End: 2025-03-16

## 2025-03-15 RX ORDER — NIFEDIPINE 30 MG/1
30 TABLET, EXTENDED RELEASE ORAL DAILY
Status: DISCONTINUED | OUTPATIENT
Start: 2025-03-15 | End: 2025-03-16

## 2025-03-15 RX ORDER — ATORVASTATIN CALCIUM 20 MG/1
20 TABLET, FILM COATED ORAL EVERY EVENING
Status: DISCONTINUED | OUTPATIENT
Start: 2025-03-15 | End: 2025-03-16

## 2025-03-15 RX ORDER — POLYETHYLENE GLYCOL 3350 17 G/17G
17 POWDER, FOR SOLUTION ORAL DAILY PRN
Status: DISCONTINUED | OUTPATIENT
Start: 2025-03-15 | End: 2025-03-16

## 2025-03-15 RX ORDER — BUPROPION HYDROCHLORIDE 150 MG/1
150 TABLET ORAL DAILY
Status: DISCONTINUED | OUTPATIENT
Start: 2025-03-15 | End: 2025-03-16

## 2025-03-15 RX ORDER — LAMOTRIGINE 100 MG/1
100 TABLET ORAL DAILY
Status: DISCONTINUED | OUTPATIENT
Start: 2025-03-15 | End: 2025-03-16

## 2025-03-15 RX ORDER — CALCIUM CARBONATE 500 MG/1
1000 TABLET, CHEWABLE ORAL EVERY 8 HOURS PRN
Status: DISCONTINUED | OUTPATIENT
Start: 2025-03-15 | End: 2025-03-16

## 2025-03-15 RX ORDER — ACETAMINOPHEN 500 MG
500 TABLET ORAL EVERY 4 HOURS PRN
Status: DISCONTINUED | OUTPATIENT
Start: 2025-03-15 | End: 2025-03-16

## 2025-03-15 RX ORDER — TAMSULOSIN HYDROCHLORIDE 0.4 MG/1
0.4 CAPSULE ORAL DAILY
Status: DISCONTINUED | OUTPATIENT
Start: 2025-03-15 | End: 2025-03-16

## 2025-03-15 RX ORDER — HYDRALAZINE HYDROCHLORIDE 20 MG/ML
10 INJECTION INTRAMUSCULAR; INTRAVENOUS ONCE
Status: COMPLETED | OUTPATIENT
Start: 2025-03-15 | End: 2025-03-15

## 2025-03-15 RX ORDER — MORPHINE SULFATE 2 MG/ML
1 INJECTION, SOLUTION INTRAMUSCULAR; INTRAVENOUS EVERY 2 HOUR PRN
Status: DISCONTINUED | OUTPATIENT
Start: 2025-03-15 | End: 2025-03-16

## 2025-03-15 RX ORDER — SENNOSIDES 8.6 MG
17.2 TABLET ORAL NIGHTLY PRN
Status: DISCONTINUED | OUTPATIENT
Start: 2025-03-15 | End: 2025-03-16

## 2025-03-15 RX ORDER — BISACODYL 10 MG
10 SUPPOSITORY, RECTAL RECTAL
Status: DISCONTINUED | OUTPATIENT
Start: 2025-03-15 | End: 2025-03-16

## 2025-03-15 RX ORDER — ONDANSETRON 2 MG/ML
4 INJECTION INTRAMUSCULAR; INTRAVENOUS EVERY 6 HOURS PRN
Status: DISCONTINUED | OUTPATIENT
Start: 2025-03-15 | End: 2025-03-16

## 2025-03-15 NOTE — CONSULTS
[facility]   part of [facility]    Report of Consultation    Godwin Fonseca Patient Status:  Inpatient    1980 MRN [id number]   Location [facility] 6NE-A Attending Minna Costello, DO   Hosp Day # 0 PCP Adrian Small MD     Date of consult: 3/15/2025    REASON FOR CONSULT:     ESRD    HISTORY OF PRESENT ILLNESS:     Godwin Fonseca is a a(n) 44 year old male w ho ESRD on iHD MWF via LUE AVF, HTN, severe MR s/p MVR x 2, HFpEF, DVT/PE, TIA, MGUS, bipolar disorder, recurrent GI bleed, diffuse body pains, recent rectal bleeding who now presents SOB. States after last admit when he drank the colonoscopy prep felt increasing dyspnea and overloaded. Went to HD yesterday, they attempted UF 4L but per pt only able to get around 2. Had some vomiting and cramping.     Seen and examined on HD currently. UF set to 3.5L net. Tolerating well so far. No complaints.     REVIEW OF SYSTEMS:     Please see HPI for pertinent positives. 10 point review of systems otherwise reviewed and negative.     HISTORY:     Past Medical History:    Anxiety state    Arrhythmia    Asthma (HCC)    Attention deficit hyperactivity disorder (ADHD)    Back problem    Bipolar 1 disorder (HCC)    CKD (chronic kidney disease) stage 3, GFR 30-59 ml/min (HCC)    Dr Meeks    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HCC)    Congestive heart disease (HCC)    COPD (chronic obstructive pulmonary disease) (HCC)    Coronary atherosclerosis    Deep vein thrombosis (HCC)    at age 17 R/T cast    Depression    Diabetes (HCC)    Dialysis patient    Diverticulosis of large intestine    Essential hypertension    3/21 echo: severe concentric LVH with normal EF and no MR or pHTN    Extrinsic asthma, unspecified    Heart attack (HCC)    - angiogram- no intervention    Heart valve disease    mitral valve repair in /    High blood pressure    High cholesterol    History of blood transfusion    History of mitral valve repair    Hyperlipidemia    Low back pain     tight and stiff after sweeping and mopping    LVH (left ventricular hypertrophy)    Migraines    Mixed hyperlipidemia     HDL 38 LDL 97 VLDL 57     Monoclonal gammopathy    IgG kappa     Muscle weakness    MVP (mitral valve prolapse)    Repair 1994 at [place]; echoes as recently as 3/21 show mild or trivial MR and no stenosis    Neuropathy    Osteoarthritis    hip ,knees    Pneumonia due to organism    Pulmonary embolism (HCC)    Renal disorder    Stroke (HCC)    TIA (transient ischemic attack)    Initial history of left-sided weakness and slurred speech. (+) cocaine. MRI of the brain, CT angiogram of the head and neck, and 2D echo are all unremarkable.     TMJ (dislocation of temporomandibular joint)    Troponin level elevated    Trop 60 60 47 with TIA and no CP: Lexiscan negative with EF 51    Visual impairment     Past Surgical History:   Procedure Laterality Date    Av fistula revision, open Left     Cabg      Colonoscopy N/A 03/26/2023    Procedure: COLONOSCOPY;  Surgeon: Heath Vu MD;  Location:  ENDOSCOPY    Colonoscopy N/A 12/30/2023    Procedure: COLONOSCOPY with cold snare polypectomy and forcep polypectomy;  Surgeon: Ousmane Suarez MD;  Location:  ENDOSCOPY    Colonoscopy N/A 3/9/2025    Procedure: COLONOSCOPY WITH COLD SNARE POLYPECTOMY AND ENDO CLIPS X 2;  Surgeon: Bakari Noguera MD;  Location:  ENDOSCOPY    Colonoscopy & polypectomy  2019    Egd  2019    Duodenitis. Biopsied. EUS for weight loss was negative    Heart surgery      Hernia surgery  08/17/2022    Dr Barnes    Laminectomy,>2 sgmt,lumbar  09/06/2018    L4-L5 Decomp Discectomy ROEM L4-L5    Mitralplasty w cp bypass  1994    [place]: Repair    Repair rotator cuff,chronic Left     torn and had a ruptured bicep    Sinus surgery        Spine surgery procedure unlisted      Valve repair  1994    mitral valve     Family History   Problem Relation Age of Onset    Hypertension Father     Alcohol and Other Disorders  Associated Father     Substance Abuse Father         cocaine    Dementia Father     Cancer Father         lung    Diabetes Mother     Cancer Mother         multiple myeloma    Hypertension Mother     Anxiety Maternal Aunt     Depression Maternal Aunt     Anxiety Maternal Aunt     Depression Maternal Aunt     Bipolar Disorder Maternal Aunt     Diabetes Maternal Grandmother     Hypertension Maternal Grandmother     Cancer Maternal Grandfather         stomach cancer    Diabetes Maternal Grandfather     Hypertension Maternal Grandfather     Alcohol and Other Disorders Associated Maternal Grandfather     Hypertension Paternal Grandmother     Hypertension Paternal Grandfather     Cancer Sister         uterine and ovarian    Hypertension Sister     Cancer Maternal Uncle         lung    Cancer Paternal Aunt         throat      reports that he quit smoking about 3 years ago. His smoking use included cigarettes. He started smoking about 30 years ago. He has a 27 pack-year smoking history. He has never been exposed to tobacco smoke. He has never used smokeless tobacco. He reports that he does not drink alcohol and does not use drugs.    ALLERGIES:     Allergies[1]    MEDICATIONS:       Current Facility-Administered Medications:     ARIPiprazole (Abilify) tab 15 mg, 15 mg, Oral, Daily    atorvastatin (Lipitor) tab 20 mg, 20 mg, Oral, QPM    buPROPion ER (Wellbutrin XL) 24 hr tab 150 mg, 150 mg, Oral, Daily    calcium acetate (Phoslo) cap 667 mg, 667 mg, Oral, TID with meals    carvedilol (Coreg) tab 25 mg, 25 mg, Oral, BID with meals    cloNIDine (Catapres) tab 0.1 mg, 0.1 mg, Oral, BID    FLUoxetine (PROzac) cap 40 mg, 40 mg, Oral, Daily    gabapentin (Neurontin) cap 200 mg, 200 mg, Oral, TID    hydrALAZINE (Apresoline) tab 25 mg, 25 mg, Oral, TID    lamoTRIgine (LaMICtal) tab 100 mg, 100 mg, Oral, Daily    losartan (Cozaar) tab 100 mg, 100 mg, Oral, Daily    NIFEdipine ER (Procardia-XL) 24 hr tab 30 mg, 30 mg, Oral, Daily     tamsulosin (Flomax) cap 0.4 mg, 0.4 mg, Oral, Daily    heparin (Porcine) 5000 UNIT/ML injection 5,000 Units, 5,000 Units, Subcutaneous, Q8H MARTHA    acetaminophen (Tylenol Extra Strength) tab 500 mg, 500 mg, Oral, Q4H PRN    melatonin tab 3 mg, 3 mg, Oral, Nightly PRN    polyethylene glycol (PEG 3350) (Miralax) 17 g oral packet 17 g, 17 g, Oral, Daily PRN    sennosides (Senokot) tab 17.2 mg, 17.2 mg, Oral, Nightly PRN    bisacodyl (Dulcolax) 10 MG rectal suppository 10 mg, 10 mg, Rectal, Daily PRN    ondansetron (Zofran) 4 MG/2ML injection 4 mg, 4 mg, Intravenous, Q6H PRN    prochlorperazine (Compazine) 10 MG/2ML injection 5 mg, 5 mg, Intravenous, Q8H PRN    morphINE PF 2 MG/ML injection 1 mg, 1 mg, Intravenous, Q2H PRN **OR** morphINE PF 2 MG/ML injection 2 mg, 2 mg, Intravenous, Q2H PRN **OR** morphINE PF 4 MG/ML injection 4 mg, 4 mg, Intravenous, Q2H PRN    sodium chloride 0.9 % IV bolus 100 mL, 100 mL, Intravenous, Q30 Min PRN **AND** albumin human (Albumin) 25% injection 25 g, 25 g, Intravenous, PRN Dialysis    nitroGLYCERIN in dextrose 5% 50 mg/250mL infusion premix,  mcg/min, Intravenous, Titrated  No current outpatient medications on file.         PHYSICAL EXAM:     Vital Signs: /81   Pulse 90   Temp 97.5 °F (36.4 °C)   Resp 19   Ht 6' 2\" (1.88 m)   Wt 256 lb 13.4 oz (116.5 kg)   SpO2 96%   BMI 32.98 kg/m²   Temp (24hrs), Av.9 °F (36.6 °C), Min:97.3 °F (36.3 °C), Max:98.9 °F (37.2 °C)       Intake/Output Summary (Last 24 hours) at 3/15/2025 1419  Last data filed at 3/15/2025 0600  Gross per 24 hour   Intake 232 ml   Output --   Net 232 ml     Wt Readings from Last 3 Encounters:   03/15/25 256 lb 13.4 oz (116.5 kg)   03/10/25 251 lb 1.6 oz (113.9 kg)   25 225 lb (102.1 kg)       General: NAD  HEENT: NCAT, MMM, EOMI  Neck: Supple   Cardiac: Regular rate and rhythm   Lungs: CTAB   Abdomen: Soft, non-tender, non-distended   : No CVA tenderness  Extremities: no leg  edema  Neurologic/Psych: mentating well, no asterixis  Skin: No rashes    LABORATORY DATA:       Lab Results   Component Value Date     (H) 03/14/2025    BUN 62 (H) 03/14/2025    BUNCREA 13.0 03/07/2022    CREATSERUM 6.01 (H) 03/14/2025    ANIONGAP 11 03/14/2025    GFR 59 (L) 01/04/2018    GFRNAA 30 (L) 07/12/2022    GFRAA 35 (L) 07/12/2022    CA 9.0 03/14/2025    OSMOCALC 305 (H) 03/14/2025    ALKPHO 90 03/14/2025    AST 60 (H) 03/14/2025    ALT 65 (H) 03/14/2025    BILT 0.4 03/14/2025    TP 7.2 03/14/2025    ALB 4.0 03/14/2025    GLOBULIN 3.2 03/14/2025     03/14/2025    K 4.1 03/14/2025     03/14/2025    CO2 27.0 03/14/2025     Lab Results   Component Value Date    WBC 7.2 03/14/2025    RBC 2.48 (L) 03/14/2025    HGB 8.2 (L) 03/14/2025    HCT 24.0 (L) 03/14/2025    .0 03/14/2025    MPV 11.5 12/14/2012    MCV 96.8 03/14/2025    MCH 33.1 03/14/2025    MCHC 34.2 03/14/2025    RDW 15.5 03/14/2025    NEPRELIM 4.67 03/14/2025    NEPERCENT 65.0 03/14/2025    LYPERCENT 16.5 03/14/2025    MOPERCENT 13.4 03/14/2025    EOPERCENT 3.5 03/14/2025    BAPERCENT 1.3 03/14/2025    NE 4.67 03/14/2025    LYMABS 1.18 03/14/2025    MOABSO 0.96 03/14/2025    EOABSO 0.25 03/14/2025    BAABSO 0.09 03/14/2025     Lab Results   Component Value Date    MALBP 167.00 05/24/2023    CREUR 142.00 05/24/2023    CREUR 142.00 05/24/2023     Lab Results   Component Value Date    COLORUR Light-Yellow 11/17/2024    CLARITY Clear 11/17/2024    SPECGRAVITY 1.013 11/17/2024    GLUUR Normal 11/17/2024    BILUR Negative 11/17/2024    KETUR Negative 11/17/2024    BLOODURINE Negative 11/17/2024    PHURINE 8.5 (H) 11/17/2024    PROUR 100 (A) 11/17/2024    UROBILINOGEN Normal 11/17/2024    NITRITE Negative 11/17/2024    LEUUR Negative 11/17/2024    WBCUR 1-5 11/17/2024    RBCUR 0-2 11/17/2024    EPIUR Few (A) 11/17/2024    BACUR Rare (A) 11/17/2024    CAOXUR Occasional (A) 04/20/2018    HYLUR Present (A) 05/14/2019         IMAGING:      All imaging studies personally reviewed.    CT ABDOMEN+PELVIS(CONTRAST ONLY)(CPT=74177)    Result Date: 3/15/2025  CONCLUSION:   1. Diverticulosis of the colon without evidence of diverticulitis.  2. Bladder wall thickening is chronic and stable can be seen with cystitis.  Correlation with urinalysis recommended.  3. Stable chronic pleural thickening in fluid as well as scarring at the right lung base.   LOCATION:  [facility]   Dictated by (CST): Jaycob Cassidy MD on 3/15/2025 at 0:32 AM     Finalized by (CST): Jaycob Cassidy MD on 3/15/2025 at 0:36 AM       XR CHEST AP PORTABLE  (CPT=71045)    Result Date: 3/14/2025  CONCLUSION:  Mild patchy airspace opacity at the right lung base some of which is chronic representing scarring or atelectasis with some increased overall opacity could represent superimposed pneumonitis or pneumonia.   LOCATION:  [facility]      Dictated by (CST): Jaycob Cassidy MD on 3/14/2025 at 10:40 PM     Finalized by (CST): Jaycob Cassidy MD on 3/14/2025 at 10:42 PM          ASSESSMENT/PLAN:   Godwin Fonseca is a a(n) 44 year old male w ho ESRD on iHD MWF via LUE AVF, HTN, severe MR s/p MVR x 2, HFpEF, DVT/PE, TIA, MGUS, bipolar disorder, recurrent GI bleed, diffuse body pains, recent rectal bleeding who now presents SOB    ESRD:  -- seen and examined on HD today. UF set to 3.5L net. Tolerating well so far. No complaints. HD directly managed  -- renally dose meds  -- avoid morphine and type 1 pavan     Anemia:   -- epo with HD once BPs improved     MBD / Hyperphosphatemia:  -- sevelamer 2400 TID AC  -- calcium acetate 667 TIDAC  -- low phos diet     HTN urgency / ho MV repair  -- on NTG drip per cards. UF w HD. Home BP meds  -- getting ECHO  PTA he had coreg and losartan pre-HD  take clonidine should be continued BID to avoid rebound  Typically takes remaining antihypertensives after HD unless SBP>180     LUE AVF:  -- L arm precautions        D/w dialysis RN    Thank you for allowing me to participate in  the care of your patient. Please do not hesitate to contact me with concerns or questions.    Samaria Nava MD  [facility]    3/15/2025  2:19 PM         [1]   Allergies  Allergen Reactions    Hydrochlorothiazide RASH and HIVES

## 2025-03-15 NOTE — H&P
[facility] Hospitalist H&P       CC:   Chief Complaint   Patient presents with    Dizziness     Short of breathe, dizziness    Difficulty Breathing        PCP: Adrian Small MD    History of Present Illness: Patient is a 44 year old male with PMH sig for *** presents with ***.  Patient states the symptom started *** with a duration of ***.  Symptoms are characterized as *** with *** radiations.  Associated symptoms are *** present.  No exacerbating or alleviating symptoms are appreciated.        PMH  Past Medical History:    Anxiety state    Arrhythmia    Asthma ([facility])    Attention deficit hyperactivity disorder (ADHD)    Back problem    Bipolar 1 disorder ([facility])    CKD (chronic kidney disease) stage 3, GFR 30-59 ml/min ([facility])    Dr Meeks    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[facility])    Congestive heart disease ([facility])    COPD (chronic obstructive pulmonary disease) ([facility])    Coronary atherosclerosis    Deep vein thrombosis ([facility])    at age 17 R/T cast    Depression    Diabetes ([facility])    Dialysis patient    Diverticulosis of large intestine    Essential hypertension    3/21 echo: severe concentric LVH with normal EF and no MR or pHTN    Extrinsic asthma, unspecified    Heart attack ([facility])    2016- angiogram- no intervention    Heart valve disease    mitral valve repair in 1994/    High blood pressure    High cholesterol    History of blood transfusion    History of mitral valve repair    Hyperlipidemia    Low back pain    tight and stiff after sweeping and mopping    LVH (left ventricular hypertrophy)    Migraines    Mixed hyperlipidemia     HDL 38 LDL 97 VLDL 57     Monoclonal gammopathy    IgG kappa     Muscle weakness    MVP (mitral valve prolapse)    Repair 1994 at [place]; echoes as recently as 3/21 show mild or trivial MR and no stenosis    Neuropathy    Osteoarthritis    hip ,knees    Pneumonia due to organism    Pulmonary embolism ([facility])    Renal disorder    Stroke ([facility])    TIA (transient ischemic attack)    Initial  history of left-sided weakness and slurred speech. (+) cocaine. MRI of the brain, CT angiogram of the head and neck, and 2D echo are all unremarkable.     TMJ (dislocation of temporomandibular joint)    Troponin level elevated    Trop 60 60 47 with TIA and no CP: Lexiscan negative with EF 51    Visual impairment        PSH  Past Surgical History:   Procedure Laterality Date    Av fistula revision, open Left     Cabg      Colonoscopy N/A 03/26/2023    Procedure: COLONOSCOPY;  Surgeon: Heath Vu MD;  Location:  ENDOSCOPY    Colonoscopy N/A 12/30/2023    Procedure: COLONOSCOPY with cold snare polypectomy and forcep polypectomy;  Surgeon: Ousmane Suarez MD;  Location:  ENDOSCOPY    Colonoscopy N/A 3/9/2025    Procedure: COLONOSCOPY WITH COLD SNARE POLYPECTOMY AND ENDO CLIPS X 2;  Surgeon: Bakari Noguera MD;  Location:  ENDOSCOPY    Colonoscopy & polypectomy  2019    Egd  2019    Duodenitis. Biopsied. EUS for weight loss was negative    Heart surgery      Hernia surgery  08/17/2022    Dr Barnes    Laminectomy,>2 sgmt,lumbar  09/06/2018    L4-L5 Decomp Discectomy ROEM L4-L5    Mitralplasty w cp bypass  1994    [place]: Repair    Repair rotator cuff,chronic Left     torn and had a ruptured bicep    Sinus surgery        Spine surgery procedure unlisted      Valve repair  1994    mitral valve        ALL:  Allergies[1]     Home Medications:  Medications Taking[2]      Soc Hx  Social History     Tobacco Use    Smoking status: Former     Current packs/day: 0.00     Average packs/day: 1 pack/day for 27.0 years (27.0 ttl pk-yrs)     Types: Cigarettes     Start date: 2/28/1995     Quit date: 2/28/2022     Years since quitting: 3.0     Passive exposure: Never    Smokeless tobacco: Never   Substance Use Topics    Alcohol use: No        Fam Hx  Family History   Problem Relation Age of Onset    Hypertension Father     Alcohol and Other Disorders Associated Father     Substance Abuse Father         cocaine    Dementia  Father     Cancer Father         lung    Diabetes Mother     Cancer Mother         multiple myeloma    Hypertension Mother     Anxiety Maternal Aunt     Depression Maternal Aunt     Anxiety Maternal Aunt     Depression Maternal Aunt     Bipolar Disorder Maternal Aunt     Diabetes Maternal Grandmother     Hypertension Maternal Grandmother     Cancer Maternal Grandfather         stomach cancer    Diabetes Maternal Grandfather     Hypertension Maternal Grandfather     Alcohol and Other Disorders Associated Maternal Grandfather     Hypertension Paternal Grandmother     Hypertension Paternal Grandfather     Cancer Sister         uterine and ovarian    Hypertension Sister     Cancer Maternal Uncle         lung    Cancer Paternal Aunt         throat       Review of Systems  Comprehensive ROS reviewed and negative except for what's stated above.     OBJECTIVE:  /81   Pulse 90   Temp 97.5 °F (36.4 °C)   Resp 19   Ht 6' 2\" (1.88 m)   Wt 256 lb 13.4 oz (116.5 kg)   SpO2 96%   BMI 32.98 kg/m²   General:  Alert, no distress   Head:  Normocephalic, without obvious abnormality, atraumatic.   Eyes:  Sclera anicteric, No conjunctival pallor, EOMs intact.    Nose: Nares normal. Septum midline. Mucosa normal. No drainage.   Throat: Lips, mucosa, and tongue normal. Teeth and gums normal.   Neck: Supple, symmetrical, trachea midline,   Lungs:   Clear to auscultation bilaterally. Normal effort   Chest wall:  No tenderness or deformity.   Heart:  Regular rate and rhythm, S1, S2 normal, no murmur, rub or gallop appreciated   Abdomen:   Soft, non-tender. Bowel sounds normal. No masses,  Non distended   Extremities: Extremities normal, atraumatic, no cyanosis or edema.   Skin: Skin color, texture, turgor normal. No rashes or lesions.    Neurologic: Normal strength, no focal deficit appreciated     Diagnostic Data:    CBC/Chem  Recent Labs   Lab 03/08/25  2214 03/09/25  0649 03/10/25  0544 03/14/25  2141   WBC 9.8 8.4 6.7 7.2    HGB 8.1* 8.0* 7.8* 8.2*   MCV 97.2 97.2 95.4 96.8   .0 210.0 189.0 212.0   INR 1.07  --   --   --        Recent Labs   Lab 03/08/25 2214 03/09/25  0649 03/10/25  0544 03/14/25  2141    143 140 138   K 4.1 4.2 4.5 4.1    104 105 100   CO2 24.0 26.0 24.0 27.0   BUN 51* 56* 56* 62*   CREATSERUM 7.66* 7.81* 8.15* 6.01*   GLU 97 89 97 132*   CA 8.8 8.9 8.6* 9.0   MG  --  2.0  --   --        Recent Labs   Lab 03/08/25 2214 03/09/25  0649 03/14/25  2141   ALT 38 32 65*   AST 29 30 60*   ALB 3.9 4.0 4.0       No results for input(s): \"TROP\" in the last 168 hours.    CXR: image personally reviewed     Radiology: CT ABDOMEN+PELVIS(CONTRAST ONLY)(CPT=74177)    Result Date: 3/15/2025  CONCLUSION:   1. Diverticulosis of the colon without evidence of diverticulitis.  2. Bladder wall thickening is chronic and stable can be seen with cystitis.  Correlation with urinalysis recommended.  3. Stable chronic pleural thickening in fluid as well as scarring at the right lung base.   LOCATION:  [facility]   Dictated by (CST): Jaycob Cassidy MD on 3/15/2025 at 0:32 AM     Finalized by (CST): Jaycob Cassidy MD on 3/15/2025 at 0:36 AM       XR CHEST AP PORTABLE  (CPT=71045)    Result Date: 3/14/2025  CONCLUSION:  Mild patchy airspace opacity at the right lung base some of which is chronic representing scarring or atelectasis with some increased overall opacity could represent superimposed pneumonitis or pneumonia.   LOCATION:  [facility]      Dictated by (CST): Jaycob Cassidy MD on 3/14/2025 at 10:40 PM     Finalized by (CST): Jaycob Cassidy MD on 3/14/2025 at 10:42 PM          ASSESSMENT / PLAN:       FN:  - IVF:  - Diet:    DVT Prophy:  Atrophy:  Lines:    Dispo: pending clinical course    Outpatient records or previous hospital records reviewed.     Further recommendations pending patient's clinical course.  [facility] hospitalist to continue to follow patient while in house    Patient and/or patient's family given opportunity to ask  questions and note understanding and agreeing with therapeutic plan as outlined    Thank You,  DO Kevin Pritchard Hospitalist  Answering Service number: [phone]         [1]   Allergies  Allergen Reactions    Hydrochlorothiazide RASH and HIVES   [2]   Outpatient Medications Marked as Taking for the 3/14/25 encounter (Hospital Encounter)   Medication Sig Dispense Refill    Phenylephrine HCl (PREPARATION H) 0.25 % Rectal Suppos Place 1 suppository rectally 4 (four) times daily as needed (Hemorrhoids after bowel movements). 24 suppository 0    atorvastatin 20 MG Oral Tab Take 1 tablet (20 mg total) by mouth every evening.      HYDROcodone-acetaminophen  MG Oral Tab Take 1 tablet by mouth every 6 (six) hours as needed for Pain.      hydrALAZINE 25 MG Oral Tab Take 1 tablet (25 mg total) by mouth 3 (three) times daily.      cloNIDine 0.1 MG Oral Tab Take 1 tablet (0.1 mg total) by mouth 2 (two) times daily. 60 tablet 0    NIFEdipine ER 30 MG Oral Tablet 24 Hr Take 1 tablet (30 mg total) by mouth daily. 30 tablet 0    sevelamer carbonate 800 MG Oral Tab Take 3 tablets (2,400 mg total) by mouth 3 (three) times daily with meals. 270 tablet 0    calcium acetate 667 MG Oral Cap Take 1 capsule (667 mg total) by mouth with breakfast, with lunch, and with evening meal. 90 capsule 0    ARIPiprazole 5 MG Oral Tab Take 1 tablet (5 mg total) by mouth daily. Take 5mg (1 tablet) by mouth daily. Take each dose with 1 tablet of 10mg aripiprazole for a total daily dose of 15mg aripiprazole.      ARIPiprazole 10 MG Oral Tab Take 1 tablet (10 mg total) by mouth daily. Take 10mg (1 tablet) by mouth daily. Take each dose with 1 tablet of 5mg aripiprazole for a total daily dose of 15mg aripiprazole.      ergocalciferol 1.25 MG (97251 UT) Oral Cap Take 1 capsule (50,000 Units total) by mouth once a week.      losartan 100 MG Oral Tab Take 1 tablet (100 mg total) by mouth daily.      gabapentin 100 MG Oral Cap Take 2 capsules  (200 mg total) by mouth 3 (three) times daily. 180 capsule 0    VYVANSE 60 MG Oral Cap Take 1 capsule (60 mg total) by mouth every morning.      lamoTRIgine 100 MG Oral Tab Take 1 tablet (100 mg total) by mouth daily.      albuterol 108 (90 Base) MCG/ACT Inhalation Aero Soln Inhale 2 puffs into the lungs every 6 (six) hours as needed for Wheezing.      tamsulosin 0.4 MG Oral Cap Take 1 capsule (0.4 mg total) by mouth daily.      buPROPion  MG Oral Tablet 24 Hr Take 1 tablet (150 mg total) by mouth daily.      FLUoxetine HCl 40 MG Oral Cap Take 1 capsule (40 mg total) by mouth daily. 7 capsule 0    carvedilol 25 MG Oral Tab Take 1 tablet (25 mg total) by mouth in the morning and 1 tablet (25 mg total) in the evening. Take with meals. 60 tablet 6    Fluticasone Propionate 50 MCG/ACT Nasal Suspension SPRAY ONCE INTO EACH NOSTRIL BID PRN 15.8 mL 0

## 2025-03-15 NOTE — PLAN OF CARE
Received pt. From  [facility] @ 8160. Pt. A & O x4. VSS - NSR. C/o SOB & stabbing abdominal pain. PRN pain medication given w/ good relief. Remains on room air. Pt. Does state he feels \"fluid overloaded\". Renal notified. Nitroglycerin infusing to maintain SBP within parameters. Plan for possible dialysis this AM if ok w/ cardiology d/t elevated troponin. Will continue to monitor closely.

## 2025-03-15 NOTE — PLAN OF CARE
Patient off nitroglycerin gtt.  Intermittent complaints of abdominal pain relieved by PRN.  Patient had ordered dialysis per renal cleared by cardiology.

## 2025-03-15 NOTE — ED PROVIDER NOTES
Patient Seen in: [facility]      History     Chief Complaint   Patient presents with    Dizziness     Short of breathe, dizziness    Difficulty Breathing     Stated Complaint: Hypertensive urgency    Subjective:   HPI      This is a 44-year-old male with history of end-stage renal disease on hemodialysis Monday Wednesday Friday presents emergency room for evaluation of shortness of breath, states he feels like he is \"fluid overloaded \".  Patient states symptoms started a few days ago while he was admitted to the hospital, states after drinking prep for colonoscopy he started feeling some shortness of breath.  Patient on review of medical records was admitted for bright red blood per rectum, had colonoscopy which did reveal hemorrhoids as the etiology.  Patient reports he did have dialysis today but still feels he is fluid overloaded, feels some heaviness in the chest, denies associated lightheaded dizzy nausea or diaphoresis.  Patient also complains of some abdominal discomfort did notice some blood per rectum tonight as well.  Denies fevers or chills    Objective:     Past Medical History:    Anxiety state    Arrhythmia    Asthma ([facility])    Attention deficit hyperactivity disorder (ADHD)    Back problem    Bipolar 1 disorder ([facility])    CKD (chronic kidney disease) stage 3, GFR 30-59 ml/min ([facility])    Dr Meeks    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[facility])    Congestive heart disease (HCC)    COPD (chronic obstructive pulmonary disease) ([facility])    Coronary atherosclerosis    Deep vein thrombosis ([facility])    at age 17 R/T cast    Depression    Diabetes ([facility])    Dialysis patient    Diverticulosis of large intestine    Essential hypertension    3/21 echo: severe concentric LVH with normal EF and no MR or pHTN    Extrinsic asthma, unspecified    Heart attack (HCC)    2016- angiogram- no intervention    Heart valve disease    mitral valve repair in 1994/    High blood pressure    High cholesterol    History of blood transfusion     History of mitral valve repair    Hyperlipidemia    Low back pain    tight and stiff after sweeping and mopping    LVH (left ventricular hypertrophy)    Migraines    Mixed hyperlipidemia     HDL 38 LDL 97 VLDL 57     Monoclonal gammopathy    IgG kappa     Muscle weakness    MVP (mitral valve prolapse)    Repair 1994 at [facility]; echoes as recently as 3/21 show mild or trivial MR and no stenosis    Neuropathy    Osteoarthritis    hip ,knees    Pneumonia due to organism    Pulmonary embolism (HCC)    Renal disorder    Stroke (HCC)    TIA (transient ischemic attack)    Initial history of left-sided weakness and slurred speech. (+) cocaine. MRI of the brain, CT angiogram of the head and neck, and 2D echo are all unremarkable.     TMJ (dislocation of temporomandibular joint)    Troponin level elevated    Trop 60 60 47 with TIA and no CP: Lexiscan negative with EF 51    Visual impairment              Past Surgical History:   Procedure Laterality Date    Av fistula revision, open Left     Cabg      Colonoscopy N/A 03/26/2023    Procedure: COLONOSCOPY;  Surgeon: Heath Vu MD;  Location:  ENDOSCOPY    Colonoscopy N/A 12/30/2023    Procedure: COLONOSCOPY with cold snare polypectomy and forcep polypectomy;  Surgeon: Ousmane Suarez MD;  Location:  ENDOSCOPY    Colonoscopy N/A 3/9/2025    Procedure: COLONOSCOPY WITH COLD SNARE POLYPECTOMY AND ENDO CLIPS X 2;  Surgeon: Bakari Noguera MD;  Location:  ENDOSCOPY    Colonoscopy & polypectomy  2019    Egd  2019    Duodenitis. Biopsied. EUS for weight loss was negative    Heart surgery      Hernia surgery  08/17/2022    Dr Barnes    Laminectomy,>2 sgmt,lumbar  09/06/2018    L4-L5 Decomp Discectomy ROEM L4-L5    Mitralplasty w cp bypass  1994    [facility]: Repair    Repair rotator cuff,chronic Left     torn and had a ruptured bicep    Sinus surgery        Spine surgery procedure unlisted      Valve repair  1994    mitral valve                Social History      Socioeconomic History    Marital status:    Tobacco Use    Smoking status: Former     Current packs/day: 0.00     Average packs/day: 1 pack/day for 27.0 years (27.0 ttl pk-yrs)     Types: Cigarettes     Start date: 2/28/1995     Quit date: 2/28/2022     Years since quitting: 3.0     Passive exposure: Never    Smokeless tobacco: Never   Vaping Use    Vaping status: Never Used   Substance and Sexual Activity    Alcohol use: No    Drug use: No     Social Drivers of Health     Food Insecurity: No Food Insecurity (3/15/2025)    NCSS - Food Insecurity     Worried About Running Out of Food in the Last Year: No     Ran Out of Food in the Last Year: No   Transportation Needs: No Transportation Needs (3/15/2025)    NCSS - Transportation     Lack of Transportation: No   Housing Stability: Not At Risk (3/15/2025)    NCSS - Housing/Utilities     Has Housing: Yes     Worried About Losing Housing: No     Unable to Get Utilities: No                  Physical Exam     ED Triage Vitals [03/14/25 2126]   /90   Pulse 106   Resp (!) 30   Temp 98.9 °F (37.2 °C)   Temp src Oral   SpO2 97 %   O2 Device None (Room air)       Current Vitals:   Vital Signs  BP: (!) 149/103  Pulse: 101  Resp: 23  Temp: 97.3 °F (36.3 °C)  Temp src: Temporal  MAP (mmHg): (!) 117    Oxygen Therapy  SpO2: 98 %  O2 Device: None (Room air)  Pulse Oximetry Type: Continuous  Oximetry Probe Site Changed: No  Pulse Ox Probe Location: Left hand        Physical Exam  GENERAL: Patient is awake, alert  HEENT: no scleral icterus.  Mucous membranes are moist, oropharynx is clear  NECK: Neck is supple, there is no nuchal rigidity.  HEART: Regular rate and rhythm, no murmurs.  LUNGS: Decreased breath sounds at the bases with fine rales.  No wheezing rhonchi or stridor  ABDOMEN: Soft, nondistended, mild diffuse tender, bowel sounds are present, no rebound, no rigidity, no guarding.no pulsatile masses. No CVA tenderness  EXTREMITIES: No peripheral edema, no calf  tenderness      ED Course     Labs Reviewed   COMP METABOLIC PANEL (14) - Abnormal; Notable for the following components:       Result Value    Glucose 132 (*)     BUN 62 (*)     Creatinine 6.01 (*)     Calculated Osmolality 305 (*)     eGFR-Cr 11 (*)     AST 60 (*)     ALT 65 (*)     All other components within normal limits   CBC WITH DIFFERENTIAL WITH PLATELET - Abnormal; Notable for the following components:    RBC 2.48 (*)     HGB 8.2 (*)     HCT 24.0 (*)     All other components within normal limits   TROPONIN I HIGH SENSITIVITY - Abnormal; Notable for the following components:    Troponin I (High Sensitivity) 396 (*)     All other components within normal limits   PRO BETA NATRIURETIC PEPTIDE - Abnormal; Notable for the following components:    Pro-Beta Natriuretic Peptide 25,022 (*)     All other components within normal limits   LIPID PANEL - Abnormal; Notable for the following components:    Cholesterol, Total 200 (*)     Triglycerides 211 (*)     LDL Cholesterol 108 (*)     VLDL 36 (*)     Non HDL Chol 144 (*)     All other components within normal limits   POCT GLUCOSE - Abnormal; Notable for the following components:    POC Glucose 149 (*)     All other components within normal limits   POCT FLU TEST - Normal    Narrative:     This assay is a rapid molecular in vitro test utilizing nucleic acid amplification of influenza A and B viral RNA.   RAPID SARS-COV-2 BY PCR - Normal   TROPONIN I HIGH SENSITIVITY   RAINBOW DRAW BLUE     EKG    Rate, intervals and axes as noted on EKG Report.  Rate: 103  Rhythm: Sinus Rhythm  Reading: Sinus tachycardia.  No ST elevation.                A total of 40 minutes of critical care time (exclusive of billable procedures) was administered to manage the patient's cardiovascular instability due to his hypertensive urgency requiring admission to the CNICU.  This involved direct patient intervention, complex decision making, and/or extensive discussions with the patient,  family, and clinical staff.         MDM        Differential diagnosis before testing includes but not limited to hypertensive emergency, hypertensive urgency, CHF/pulmonary edema, pneumonia, pneumothorax, perforated viscus, which is a medical condition that poses a threat to life/function    Past Medical History/comorbidities-as noted in HPI      Radiographic images  I personally reviewed the radiographs and my individual interpretation shows chest x-ray, no pneumothorax  I also reviewed the official reports that showed CT abdomen   CONCLUSION:       1. Diverticulosis of the colon without evidence of diverticulitis.     2. Bladder wall thickening is chronic and stable can be seen with cystitis.  Correlation with urinalysis recommended.     3. Stable chronic pleural thickening in fluid as well as scarring at the right lung base.        Chest x-ray: Mild patchy airspace opacity at the right lung base some of which is chronic representing scarring or atelectasis with some increased overall opacity could represent superimposed pneumonitis or pneumonia.       Discussion of management (consult/physicians, social work, pharmacy,ect) nephrology Dr. Nava, mary hospitalist, mary cardiology     External chart review included recent hospitalization medical records reviewed as in [facility]    Medications Provided: IV nitroglycerin, IV hydralazine    Course of Events during Emergency Room Visit include IV established, patient placed on cardiac monitor and pulse ox, patient was started on IV nitro, chest x-ray performed.  CBC white count 7.2 hemoglobin 8.2 platelet 212.  COVID and influenza testing were negative.  BMP elevated 25,022, troponin elevated 396.  Chemistry sodium 138 potassium 4.1 BUN 62 creatinine 6.21 glucose 132.  CT M/pelvis performed without acute finding.  Discussed with nephrology cardiology and hospitalist, patient will be transferred from [place] will require admission to the  ICU as we are titrating  the nitro and has required dose of hydralazine as well.  I discussed all results with the patient, patient agrees with plan    Shared decision making was utilized           Disposition:    Admission  I have discussed with the patient the results of test, differential diagnosis, and treatment plan. They expressed clear understanding of these instructions and agrees to the plan provided.       Note to patient: The 21st Century Cures Act makes medical notes like these available to patients in the interest of transparency. However, this is a medical document intended as peer to peer communication. It is written in medical language and may contain abbreviations or verbiage that are unfamiliar. It may appear blunt or direct. Medical documents are intended to carry relevant information, facts as evident, and the clinical opinion of the practitioner.             Admission disposition: 3/15/2025  1:26 AM           Medical Decision Making      Disposition and Plan     Clinical Impression:  1. Hypervolemia, unspecified hypervolemia type    2. ESRD needing dialysis (HCC)    3. Hypertensive urgency    4. Anemia, unspecified type         Disposition:  Admit  3/15/2025  1:26 am    Follow-up:  No follow-up provider specified.        Medications Prescribed:  Current Discharge Medication List              Supplementary Documentation:         Hospital Problems       Present on Admission  Date Reviewed: 1/18/2025            ICD-10-CM Noted POA    * (Principal) Hypervolemia, unspecified hypervolemia type E87.70 8/30/2024 Unknown    ESRD needing dialysis (HCC) N18.6, Z99.2 11/23/2023 Unknown    Hypertensive urgency I16.0 11/29/2020 Unknown

## 2025-03-15 NOTE — CONSULTS
[facility]   Division of Cardiology   Consultation Note     Godwin Fonseca Patient Status:  Hospitalized    1980 MRN [id number]   Location [facility] 6NE-A Attending Minna Costello,    Hosp Day # 0 PCP Adrian Small MD     Impression:  HTN urgency  Admission BP >200mmHg systolic  Longstanding HTN  On 5 drugs as outpatient  Currently controlled with NTG gtt; awaiting HD  Dyspnea/pulmonary edema  Due to volume from lack of HD/fluid removal  BRBPR  Recent colonoscopy (Monday) with polyps  Hgb stable at 8.2  Dyslipidemia  On statin  ESRD  HD dependent  Planning today  Nephrology following  Hx MV repair  Surgery years ago  Echo with moderate MR 2025 and mild to moderate MS (10mmHg gradient at )    Plan:  Continue current BP meds and NTG drip until he gets HD.  His BP will come down with dialysis.  May be hypotensive if dialyzed to his true \"dry weight\".  Echo to check MR severity and MS gradient given it's increase noted on last echo.  Continue outpatient meds.  Will follow in house.  Suspect he will feel dramatically better post dialysis.       Chief Complaint: HTN urgency    History of Present Illness: Godwin Fonseca is a(n) 44 year old male with hx MR repair and HD dependent renal failure who is well known to myself and the CCU staff.  He has had several prior admissions of dyspnea and HTN due to missed HD.     He notes he was undergoing eval for BRBPR.  He had colonoscopy done on Monday.  He had his standard dialysis appointments but they removed less fluid, knowing he was NPO and bowel prepping.  He could tell yesterday that he was volume overloaded.      He's had RAMSAY with mild exertion over the last day and now can feel \"heaviness\" in his chest.  This is the same sensation he has had with prior episodes of volume overload.  At baseline he can be active without any symptoms.  No CP, no RAMSAY, no syncope.    He has a headache.  He notes it's been present  since yesterday.  No neuro symptoms, however.       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[facility])    Congestive heart disease ([facility])    COPD (chronic obstructive pulmonary disease) ([facility])    Coronary atherosclerosis    Deep vein thrombosis ([facility])    at age 17 R/T cast    Depression    Diabetes ([facility])    Dialysis patient    Diverticulosis of large intestine    Essential hypertension    3/21 echo: severe concentric LVH with normal EF and no MR or pHTN    Extrinsic asthma, unspecified    Heart attack ([facility])    2016- angiogram- no intervention    Heart valve disease    mitral valve repair in 1994/    High blood pressure    High cholesterol    History of blood transfusion    History of mitral valve repair    Hyperlipidemia    Low back pain    tight and stiff after sweeping and mopping    LVH (left ventricular hypertrophy)    Migraines    Mixed hyperlipidemia     HDL 38 LDL 97 VLDL 57     Monoclonal gammopathy    IgG kappa     Muscle weakness    MVP (mitral valve prolapse)    Repair 1994 at [place]; echoes as recently as 3/21 show mild or trivial MR and no stenosis    Neuropathy    Osteoarthritis    hip ,knees    Pneumonia due to organism    Pulmonary embolism ([facility])    Renal disorder    Stroke ([facility])    TIA (transient ischemic attack)    Initial history of left-sided weakness and slurred speech. (+) cocaine. MRI of the brain, CT angiogram of the head and neck, and 2D echo are all unremarkable.     TMJ (dislocation of temporomandibular joint)    Troponin level elevated    Trop 60 60 47 with TIA and no CP: Lexiscan negative with EF 51    Visual impairment     Past Surgical History:   Procedure Laterality Date    Av fistula revision, open Left     Cabg      Colonoscopy N/A  03/26/2023    Procedure: COLONOSCOPY;  Surgeon: Heath Vu MD;  Location:  ENDOSCOPY    Colonoscopy N/A 12/30/2023    Procedure: COLONOSCOPY with cold snare polypectomy and forcep polypectomy;  Surgeon: Ousmane Suarez MD;  Location:  ENDOSCOPY    Colonoscopy N/A 3/9/2025    Procedure: COLONOSCOPY WITH COLD SNARE POLYPECTOMY AND ENDO CLIPS X 2;  Surgeon: Bakari Noguera MD;  Location:  ENDOSCOPY    Colonoscopy & polypectomy  2019    Egd  2019    Duodenitis. Biopsied. EUS for weight loss was negative    Heart surgery      Hernia surgery  08/17/2022    Dr Barnes    Laminectomy,>2 sgmt,lumbar  09/06/2018    L4-L5 Decomp Discectomy ROEM L4-L5    Mitralplasty w cp bypass  1994    [facility]iano: Repair    Repair rotator cuff,chronic Left     torn and had a ruptured bicep    Sinus surgery        Spine surgery procedure unlisted      Valve repair  1994    mitral valve     Social History     Socioeconomic History    Marital status:    Tobacco Use    Smoking status: Former     Current packs/day: 0.00     Average packs/day: 1 pack/day for 27.0 years (27.0 ttl pk-yrs)     Types: Cigarettes     Start date: 2/28/1995     Quit date: 2/28/2022     Years since quitting: 3.0     Passive exposure: Never    Smokeless tobacco: Never   Vaping Use    Vaping status: Never Used   Substance and Sexual Activity    Alcohol use: No    Drug use: No     Family History   Problem Relation Age of Onset    Hypertension Father     Alcohol and Other Disorders Associated Father     Substance Abuse Father         cocaine    Dementia Father     Cancer Father         lung    Diabetes Mother     Cancer Mother         multiple myeloma    Hypertension Mother     Anxiety Maternal Aunt     Depression Maternal Aunt     Anxiety Maternal Aunt     Depression Maternal Aunt     Bipolar Disorder Maternal Aunt     Diabetes Maternal Grandmother     Hypertension Maternal Grandmother     Cancer Maternal Grandfather         stomach cancer    Diabetes Maternal  Grandfather     Hypertension Maternal Grandfather     Alcohol and Other Disorders Associated Maternal Grandfather     Hypertension Paternal Grandmother     Hypertension Paternal Grandfather     Cancer Sister         uterine and ovarian    Hypertension Sister     Cancer Maternal Uncle         lung    Cancer Paternal Aunt         throat        Inpatient Medications:    ARIPiprazole  15 mg Oral Daily    atorvastatin  20 mg Oral QPM    buPROPion ER  150 mg Oral Daily    calcium acetate  667 mg Oral TID with meals    carvedilol  25 mg Oral BID with meals    cloNIDine  0.1 mg Oral BID    FLUoxetine HCl  40 mg Oral Daily    gabapentin  200 mg Oral TID    hydrALAZINE  25 mg Oral TID    lamoTRIgine  100 mg Oral Daily    losartan  100 mg Oral Daily    NIFEdipine ER  30 mg Oral Daily    tamsulosin  0.4 mg Oral Daily    heparin  5,000 Units Subcutaneous Q8H MARTHA       Continuous Infusions:    nitroGLYCERIN in dextrose 5% 120 mcg/min (03/15/25 0800)       PRN Medications:     acetaminophen    melatonin    polyethylene glycol (PEG 3350)    sennosides    bisacodyl    ondansetron    prochlorperazine    morphINE **OR** morphINE **OR** morphINE    sodium chloride **AND** albumin human    Outpatient Medications:   Medications Ordered Prior to Encounter[1]    Home Medications:  Medications Taking[2]    Allergies:   Allergies[3]     Review of Systems:   Ten point review of systems is unremarkable except as mentioned above.     Vitals:   /79   Pulse 91   Temp 97.5 °F (36.4 °C)   Resp 25   Ht 188 cm (6' 2\")   Wt 256 lb 13.4 oz (116.5 kg)   SpO2 99%   BMI 32.98 kg/m²   Wt Readings from Last 3 Encounters:   03/15/25 256 lb 13.4 oz (116.5 kg)   03/10/25 251 lb 1.6 oz (113.9 kg)   03/04/25 225 lb (102.1 kg)       Examination:  General: Well developed, well nourished male.  No distress.  Neck:  No JVD.  Normal ROM.  Cardiac: Regular rate and rhythm.  No murmurs, rubs, or gallops.   Lungs: Clear to ascultation bilaterally.     Abdomen: Soft.  Non-distended.  Non-tender.  Bowel sounds present.    Extremities: Warm, no significant edema.  Palpable pulses.  Neurologic: Alert and oriented.  No gross deficits.  Integument:  No visible rashes identified.  Psych: The patient's affect is appropriate.     Labs:   Recent Labs   Lab 03/08/25 2214 03/09/25 0649 03/10/25  0544 03/14/25  2141   WBC 9.8 8.4 6.7 7.2   HGB 8.1* 8.0* 7.8* 8.2*   MCV 97.2 97.2 95.4 96.8   .0 210.0 189.0 212.0   INR 1.07  --   --   --      Recent Labs   Lab 03/08/25 2214 03/09/25 0649 03/10/25  0544 03/14/25  2141    143 140 138   K 4.1 4.2 4.5 4.1    104 105 100   CO2 24.0 26.0 24.0 27.0   BUN 51* 56* 56* 62*   CREATSERUM 7.66* 7.81* 8.15* 6.01*   GLU 97 89 97 132*   CA 8.8 8.9 8.6* 9.0   MG  --  2.0  --   --      Recent Labs   Lab 03/08/25 2214 03/09/25  0649 03/14/25  2141   ALT 38 32 65*   AST 29 30 60*   ALB 3.9 4.0 4.0     Lab Results   Component Value Date    A1C 4.6 03/08/2024    A1C 5.4 08/24/2023    A1C 5.0 04/06/2023     Lab Results   Component Value Date    CHOLEST 200 (H) 03/14/2025    HDL 56 03/14/2025     (H) 03/14/2025    TRIG 211 (H) 03/14/2025        Studies:   Ekg: Sinus.  LVH strain.  Similar to prior tracings (which vary greatly).  Tele: Stable.  Echo (1/2025):   1. Left ventricle: The cavity size was normal. Wall thickness was moderately increased. There was concentric hypertrophy. Systolic function was normal. The estimated ejection fraction was 60-65%, by visual assessment.   2. Left atrium: The left atrial volume was markedly increased.   3. Right atrium: The atrium was mildly dilated.   4. Mitral valve: A prosthetic device is present. The prosthesis had a normal range of motion. The sewing ring appeared normal, had no rocking motion, and showed no evidence of dehiscence. Leaflet separation was mildly      reduced. Transvalvular velocity was increased. The findings were consistent with mild stenosis. There was  moderate regurgitation. The mean diastolic gradient was 10mm Hg at a heart rate 108 bpm.     Thank you for allowing [facility] to care for your patient. Please contact me with any questions!     Benja Reyes MD  General, Interventional  Structural & Endovascular  Cardiology           [1]   Current Facility-Administered Medications on File Prior to Encounter   Medication Dose Route Frequency Provider Last Rate Last Admin    [COMPLETED] epoetin sylvia (Epogen, Procrit) 5,000 Units injection  5,000 Units Subcutaneous Once Samaria Nava MD   5,000 Units at 03/10/25 1647    [COMPLETED] morphINE PF 4 MG/ML injection 4 mg  4 mg Intravenous Once Cody Chilel MD   4 mg at 25 0018    [COMPLETED] polyethylene glycol-electrolyte (Golytely) 236 g oral solution 4,000 mL  4,000 mL Oral Once Bakari Noguera MD   4,000 mL at 25 0315    [] naloxone (Narcan) 0.4 MG/ML injection 80 mcg  80 mcg Intravenous PRN Edmond Esquivel MD        [COMPLETED] morphINE PF 4 MG/ML injection 4 mg  4 mg Intravenous Once Cody Chilel MD   4 mg at 25 2229    [COMPLETED] iopamidol 76% (ISOVUE-370) injection for power injector  100 mL Intravenous ONCE PRN Cody Chilel MD   100 mL at 25 2317    [COMPLETED] epoetin sylvia (Epogen, Procrit) 5,000 Units injection  5,000 Units Intravenous Once Lenka Bond MD   5,000 Units at 25 1600    [COMPLETED] butalbital-acetaminophen-caffeine (Fioricet) -40 MG per tab 1 tablet  1 tablet Oral Once Angus Ferguson MD   1 tablet at 25 0006    [COMPLETED] potassium chloride (Klor-Con M20) tab 20 mEq  20 mEq Oral Once Lenka Bond MD   20 mEq at 25 0943    [COMPLETED] epoetin sylvia (Epogen, Procrit) 5,000 Units injection  5,000 Units Intravenous Once Lenka Bond MD   5,000 Units at 25 1400    [COMPLETED] iopamidol 76% (ISOVUE-370) injection for power injector  100 mL Intravenous ONCE PRN Regina Salmon MD   100 mL at 25 1316    [COMPLETED] ondansetron  (Zofran) 4 MG/2ML injection 4 mg  4 mg Intravenous Once Gavi Charles MD   4 mg at 25 173    [COMPLETED] labetalol (Trandate) 5 mg/mL injection 20 mg  20 mg Intravenous Once Gavi Charles MD   20 mg at 25 173    [COMPLETED] HYDROmorphone (Dilaudid) 1 MG/ML injection 0.5 mg  0.5 mg Intravenous Q30 Min PRN Gavi Charles MD   0.5 mg at 25 203    [COMPLETED] labetalol (Trandate) 5 mg/mL injection 20 mg  20 mg Intravenous Once Gavi Charles MD   20 mg at 25 183    [COMPLETED] piperacillin-tazobactam (Zosyn) 4.5 g in dextrose 5% 100 mL IVPB-ADDV  4.5 g Intravenous Once Gavi Charles MD   Stopped at 25 1916    [] sodium chloride 0.9 % IV bolus 100 mL  100 mL Intravenous Q30 Min PRN Samaria Nava MD        And    [] albumin human (Albumin) 25% injection 25 g  25 g Intravenous PRN Dialysis Samaria Nava MD        [COMPLETED] HYDROmorphone (Dilaudid) 1 MG/ML injection 0.5 mg  0.5 mg Intravenous Once Yuriy Forrest MD   0.5 mg at 25 2252    [COMPLETED] vancomycin (Vancocin) 750 mg in sodium chloride 0.9% 250 mL IVPB-ADDV  7.5 mg/kg Intravenous Once Malissa Monk  mL/hr at 25 1423 750 mg at 25 1423    [COMPLETED] HYDROmorphone (Dilaudid) 1 MG/ML injection 0.5 mg  0.5 mg Intravenous Once Jhonny Rebolledo MD   0.5 mg at 25 0142    [COMPLETED] HYDROmorphone (Dilaudid) 1 MG/ML injection 0.4 mg  0.4 mg Intravenous Once Ria Gutierrez MD   0.4 mg at 25 1853    [COMPLETED] vancomycin (Vancocin) 750 mg in sodium chloride 0.9% 250 mL IVPB-ADDV  7.5 mg/kg Intravenous Once Joseph, Jagdeep,  mL/hr at 25 750 mg at 25    [COMPLETED] lidocaine-EPINEPHrine (Xylocaine-Epinephrine) 2 %-1:214150 injection             [COMPLETED] lidocaine (Xylocaine) 1 % injection             [COMPLETED] fentaNYL (Sublimaze) 50 mcg/mL injection             [COMPLETED] vancomycin (Vancocin)  2.25 g in sodium chloride 0.9% 500 mL IVPB premix  25 mg/kg Intravenous Once Stacy Shane MD   Stopped at 25 1038    [COMPLETED] morphINE PF 4 MG/ML injection 4 mg  4 mg Intravenous Once Stacy Shane MD   4 mg at 25 0746    [COMPLETED] cloNIDine (Catapres) tab 0.1 mg  0.1 mg Oral Once Stacy Shane MD   0.1 mg at 25 0916    [COMPLETED] hydrALAzine (Apresoline) 20 mg/mL injection 10 mg  10 mg Intravenous Once Stacy Shane MD   10 mg at 25 0916    [COMPLETED] losartan (Cozaar) tab 100 mg  100 mg Oral Once Stacy Shane MD   100 mg at 25 0952    [COMPLETED] carvedilol (Coreg) tab 25 mg  25 mg Oral Once Stacy Shane MD   25 mg at 25 0952    [COMPLETED] NIFEdipine ER (Procardia-XL) 24 hr tab 30 mg  30 mg Oral Once Stacy Shane MD   30 mg at 25 1013    [COMPLETED] fentaNYL (Sublimaze) 50 mcg/mL injection 50 mcg  50 mcg Intravenous Once Stacy Shane MD   50 mcg at 25 1034    [] sodium chloride 0.9 % IV bolus 100 mL  100 mL Intravenous Q30 Min PRN Samaria Nava MD        And    [] albumin human (Albumin) 25% injection 25 g  25 g Intravenous PRN Dialysis Samaria Nava MD        [COMPLETED] heparin (Porcine) 1000 UNIT/ML injection 1,500 Units  1.5 mL Intracatheter Once Samaria Nava MD   1,500 Units at 25 1602    [COMPLETED] epoetin sylvia (Epogen, Procrit) 5,000 Units injection  5,000 Units Intravenous Once Lenka Bond MD   5,000 Units at 25 1610    [COMPLETED] vancomycin (Vancocin) 1,000 mg in sodium chloride 0.9% 250 mL IVPB-ADDV  10 mg/kg Intravenous Once Selam Jesus  mL/hr at 25 1612 1,000 mg at 25 1612    [COMPLETED] vancomycin (Vancocin) 1,000 mg in sodium chloride 0.9% 250 mL IVPB-ADDV  1,000 mg Intravenous Once Vandana Baker, Fabrizio 250 mL/hr at 25 1746 1,000 mg at 25 1746    [COMPLETED] epoetin sylvia (Epogen, Procrit) 34256 UNIT/ML injection 10,000 Units  10,000 Units Intravenous Once Ronda  MD Lenka   10,000 Units at 01/20/25 1230    [COMPLETED] vancomycin (Vancocin) 2.25 g in sodium chloride 0.9% 500 mL IVPB premix  25 mg/kg Intravenous Once Mathew Elizabeth  mL/hr at 01/19/25 2033 2,250 mg at 01/19/25 2033    [COMPLETED] aspirin chewable tab 324 mg  324 mg Oral Once Deanna Cervantes MD   324 mg at 01/18/25 1111    [COMPLETED] ondansetron (Zofran) 4 MG/2ML injection 4 mg  4 mg Intravenous Once Deanan Cervantes MD   4 mg at 01/18/25 1031    [COMPLETED] acetaminophen (Tylenol Extra Strength) tab 1,000 mg  1,000 mg Oral Once Deanna Cervantes MD   1,000 mg at 01/18/25 1221    [COMPLETED] acetaminophen (Tylenol Extra Strength) tab 1,000 mg  1,000 mg Oral Once Mary Lou Feldman DO   1,000 mg at 01/07/25 0315    [COMPLETED] ketorolac (Toradol) 15 MG/ML injection 15 mg  15 mg Intravenous Once Mary Lou Feldman DO   15 mg at 01/07/25 0315    [COMPLETED] ketorolac (Toradol) 30 MG/ML injection 30 mg  30 mg Intravenous Once Del Cordova MD   30 mg at 01/06/25 0615    [COMPLETED] acetaminophen (Tylenol Extra Strength) tab 1,000 mg  1,000 mg Oral Once Del Cordova MD   1,000 mg at 01/06/25 0736    [COMPLETED] HYDROmorphone (Dilaudid) 1 MG/ML injection        Given at 12/26/24 0427    [COMPLETED] iopamidol 76% (ISOVUE-370) injection for power injector  100 mL Intravenous ONCE PRN Gama Ray DO   100 mL at 12/25/24 1730    [COMPLETED] HYDROmorphone (Dilaudid) 1 MG/ML injection 0.5 mg  0.5 mg Intravenous Once Gama Ray DO   0.5 mg at 12/25/24 1852    [COMPLETED] HYDROmorphone (Dilaudid) 1 MG/ML injection 0.5 mg  0.5 mg Intravenous Once Vicky Weiss DO   0.5 mg at 12/25/24 2249    [COMPLETED] epoetin sylvia (Epogen, Procrit) 71917 UNIT/ML injection 10,000 Units  10,000 Units Subcutaneous Once Lenka Bond MD   10,000 Units at 12/20/24 2008    [COMPLETED] heparin (Porcine) 1000 UNIT/ML injection 1,500 Units  1,500 Units Intracatheter Once Samaria Nava MD   1,500 Units at 12/20/24 2101    [COMPLETED]  sodium ferric gluconate (Ferrlecit) 125 mg in sodium chloride 0.9% 100mL IVPB premix  125 mg Intravenous Once Lenka Bond MD   Stopped at 24 194    [] sodium chloride 0.9 % IV bolus 100 mL  100 mL Intravenous Q30 Min PRN eLnka Bond MD        And    [] albumin human (Albumin) 25% injection 25 g  25 g Intravenous PRN Dialysis Lenka Bond MD        [] heparin (Porcine) 1000 UNIT/ML injection 1,500 Units  1.5 mL Intracatheter Once Lenka Bond MD        [COMPLETED] ondansetron (Zofran) 4 MG/2ML injection 4 mg  4 mg Intravenous Once Albert Rock MD   4 mg at 24 1144    [COMPLETED] pantoprazole (Protonix) 40 mg in sodium chloride 0.9% PF 10 mL IV push  40 mg Intravenous Once Albert Rock MD   40 mg at 24 1145     Current Outpatient Medications on File Prior to Encounter   Medication Sig Dispense Refill    Phenylephrine HCl (PREPARATION H) 0.25 % Rectal Suppos Place 1 suppository rectally 4 (four) times daily as needed (Hemorrhoids after bowel movements). 24 suppository 0    atorvastatin 20 MG Oral Tab Take 1 tablet (20 mg total) by mouth every evening.      HYDROcodone-acetaminophen  MG Oral Tab Take 1 tablet by mouth every 6 (six) hours as needed for Pain.      hydrALAZINE 25 MG Oral Tab Take 1 tablet (25 mg total) by mouth 3 (three) times daily.      cloNIDine 0.1 MG Oral Tab Take 1 tablet (0.1 mg total) by mouth 2 (two) times daily. 60 tablet 0    NIFEdipine ER 30 MG Oral Tablet 24 Hr Take 1 tablet (30 mg total) by mouth daily. 30 tablet 0    sevelamer carbonate 800 MG Oral Tab Take 3 tablets (2,400 mg total) by mouth 3 (three) times daily with meals. 270 tablet 0    calcium acetate 667 MG Oral Cap Take 1 capsule (667 mg total) by mouth with breakfast, with lunch, and with evening meal. 90 capsule 0    ARIPiprazole 5 MG Oral Tab Take 1 tablet (5 mg total) by mouth daily. Take 5mg (1 tablet) by mouth daily. Take each dose with 1 tablet of 10mg  aripiprazole for a total daily dose of 15mg aripiprazole.      ARIPiprazole 10 MG Oral Tab Take 1 tablet (10 mg total) by mouth daily. Take 10mg (1 tablet) by mouth daily. Take each dose with 1 tablet of 5mg aripiprazole for a total daily dose of 15mg aripiprazole.      ergocalciferol 1.25 MG (75150 UT) Oral Cap Take 1 capsule (50,000 Units total) by mouth once a week.      losartan 100 MG Oral Tab Take 1 tablet (100 mg total) by mouth daily.      gabapentin 100 MG Oral Cap Take 2 capsules (200 mg total) by mouth 3 (three) times daily. 180 capsule 0    VYVANSE 60 MG Oral Cap Take 1 capsule (60 mg total) by mouth every morning.      lamoTRIgine 100 MG Oral Tab Take 1 tablet (100 mg total) by mouth daily.      albuterol 108 (90 Base) MCG/ACT Inhalation Aero Soln Inhale 2 puffs into the lungs every 6 (six) hours as needed for Wheezing.      tamsulosin 0.4 MG Oral Cap Take 1 capsule (0.4 mg total) by mouth daily.      buPROPion  MG Oral Tablet 24 Hr Take 1 tablet (150 mg total) by mouth daily.      FLUoxetine HCl 40 MG Oral Cap Take 1 capsule (40 mg total) by mouth daily. 7 capsule 0    carvedilol 25 MG Oral Tab Take 1 tablet (25 mg total) by mouth in the morning and 1 tablet (25 mg total) in the evening. Take with meals. 60 tablet 6    Fluticasone Propionate 50 MCG/ACT Nasal Suspension SPRAY ONCE INTO EACH NOSTRIL BID PRN 15.8 mL 0    [] polyethylene glycol, PEG 3350-KCl-NaBcb-NaCl-NaSulf, 236 g Oral Recon Soln Take 4,000 mL by mouth once for 1 dose. 4000 mL 0    ondansetron 4 MG Oral Tablet Dispersible Take 1 tablet (4 mg total) by mouth every 8 (eight) hours as needed for Nausea.     [2]   Outpatient Medications Marked as Taking for the 3/14/25 encounter (Hospital Encounter)   Medication Sig Dispense Refill    Phenylephrine HCl (PREPARATION H) 0.25 % Rectal Suppos Place 1 suppository rectally 4 (four) times daily as needed (Hemorrhoids after bowel movements). 24 suppository 0    atorvastatin 20 MG Oral  Tab Take 1 tablet (20 mg total) by mouth every evening.      HYDROcodone-acetaminophen  MG Oral Tab Take 1 tablet by mouth every 6 (six) hours as needed for Pain.      hydrALAZINE 25 MG Oral Tab Take 1 tablet (25 mg total) by mouth 3 (three) times daily.      cloNIDine 0.1 MG Oral Tab Take 1 tablet (0.1 mg total) by mouth 2 (two) times daily. 60 tablet 0    NIFEdipine ER 30 MG Oral Tablet 24 Hr Take 1 tablet (30 mg total) by mouth daily. 30 tablet 0    sevelamer carbonate 800 MG Oral Tab Take 3 tablets (2,400 mg total) by mouth 3 (three) times daily with meals. 270 tablet 0    calcium acetate 667 MG Oral Cap Take 1 capsule (667 mg total) by mouth with breakfast, with lunch, and with evening meal. 90 capsule 0    ARIPiprazole 5 MG Oral Tab Take 1 tablet (5 mg total) by mouth daily. Take 5mg (1 tablet) by mouth daily. Take each dose with 1 tablet of 10mg aripiprazole for a total daily dose of 15mg aripiprazole.      ARIPiprazole 10 MG Oral Tab Take 1 tablet (10 mg total) by mouth daily. Take 10mg (1 tablet) by mouth daily. Take each dose with 1 tablet of 5mg aripiprazole for a total daily dose of 15mg aripiprazole.      ergocalciferol 1.25 MG (49062 UT) Oral Cap Take 1 capsule (50,000 Units total) by mouth once a week.      losartan 100 MG Oral Tab Take 1 tablet (100 mg total) by mouth daily.      gabapentin 100 MG Oral Cap Take 2 capsules (200 mg total) by mouth 3 (three) times daily. 180 capsule 0    VYVANSE 60 MG Oral Cap Take 1 capsule (60 mg total) by mouth every morning.      lamoTRIgine 100 MG Oral Tab Take 1 tablet (100 mg total) by mouth daily.      albuterol 108 (90 Base) MCG/ACT Inhalation Aero Soln Inhale 2 puffs into the lungs every 6 (six) hours as needed for Wheezing.      tamsulosin 0.4 MG Oral Cap Take 1 capsule (0.4 mg total) by mouth daily.      buPROPion  MG Oral Tablet 24 Hr Take 1 tablet (150 mg total) by mouth daily.      FLUoxetine HCl 40 MG Oral Cap Take 1 capsule (40 mg total) by  mouth daily. 7 capsule 0    carvedilol 25 MG Oral Tab Take 1 tablet (25 mg total) by mouth in the morning and 1 tablet (25 mg total) in the evening. Take with meals. 60 tablet 6    Fluticasone Propionate 50 MCG/ACT Nasal Suspension SPRAY ONCE INTO EACH NOSTRIL BID PRN 15.8 mL 0   [3]   Allergies  Allergen Reactions    Hydrochlorothiazide RASH and HIVES

## 2025-03-15 NOTE — ED INITIAL ASSESSMENT (HPI)
Patient here with shortness of breath, states he feels like fluid overload. Dialysis MWF. Abdominal pain. Also complains of rectal bleeding. Discharged from [facility] Monday.

## 2025-03-16 VITALS
BODY MASS INDEX: 31.02 KG/M2 | SYSTOLIC BLOOD PRESSURE: 91 MMHG | HEART RATE: 84 BPM | OXYGEN SATURATION: 100 % | RESPIRATION RATE: 14 BRPM | DIASTOLIC BLOOD PRESSURE: 63 MMHG | TEMPERATURE: 98 F | WEIGHT: 241.69 LBS | HEIGHT: 74 IN

## 2025-03-16 LAB
ANION GAP SERPL CALC-SCNC: 9 MMOL/L (ref 0–18)
BUN BLD-MCNC: 43 MG/DL (ref 9–23)
CALCIUM BLD-MCNC: 8.7 MG/DL (ref 8.7–10.6)
CHLORIDE SERPL-SCNC: 100 MMOL/L (ref 98–112)
CO2 SERPL-SCNC: 30 MMOL/L (ref 21–32)
CREAT BLD-MCNC: 5.73 MG/DL
EGFRCR SERPLBLD CKD-EPI 2021: 12 ML/MIN/1.73M2 (ref 60–?)
ERYTHROCYTE [DISTWIDTH] IN BLOOD BY AUTOMATED COUNT: 15.3 %
GLUCOSE BLD-MCNC: 111 MG/DL (ref 70–99)
HCT VFR BLD AUTO: 24.2 %
HGB BLD-MCNC: 7.9 G/DL
MCH RBC QN AUTO: 31.7 PG (ref 26–34)
MCHC RBC AUTO-ENTMCNC: 32.6 G/DL (ref 31–37)
MCV RBC AUTO: 97.2 FL
OSMOLALITY SERPL CALC.SUM OF ELEC: 300 MOSM/KG (ref 275–295)
PLATELET # BLD AUTO: 172 10(3)UL (ref 150–450)
POTASSIUM SERPL-SCNC: 4 MMOL/L (ref 3.5–5.1)
RBC # BLD AUTO: 2.49 X10(6)UL
SODIUM SERPL-SCNC: 139 MMOL/L (ref 136–145)
WBC # BLD AUTO: 6.1 X10(3) UL (ref 4–11)

## 2025-03-16 PROCEDURE — 80048 BASIC METABOLIC PNL TOTAL CA: CPT | Performed by: HOSPITALIST

## 2025-03-16 PROCEDURE — 85027 COMPLETE CBC AUTOMATED: CPT | Performed by: HOSPITALIST

## 2025-03-16 RX ORDER — POLYETHYLENE GLYCOL 3350 17 G/17G
17 POWDER, FOR SOLUTION ORAL DAILY PRN
Status: ON HOLD | COMMUNITY
Start: 2025-03-16

## 2025-03-16 NOTE — PROGRESS NOTES
[facility]   Division of Cardiology   Consultation Note     Godwin Fonseca Patient Status:  Hospitalized    1980 MRN [id number]   Location [facility] 6NE-A Attending Minna Costello DO   Hosp Day # 1 PCP Adrian Small MD     Impression:  HTN urgency  Admission BP >200mmHg systolic  Longstanding HTN  On 5 drugs as outpatient  Currently controlled with NTG gtt; awaiting HD  Dyspnea/pulmonary edema  Due to volume from lack of HD/fluid removal  BRBPR  Recent colonoscopy (Monday) with polyps  Hgb stable at 8.2  Dyslipidemia  On statin  ESRD  HD dependent  Planning today  Nephrology following  Hx MV repair  Surgery years ago  Echo with moderate MR 2025 and mild to moderate MS (10mmHg gradient at )    Plan:  Ok to DC home.  Needs to FU with cardiology for outpatient echo and surveillance of mitral valve.  Can see [facility] cards in [place] or me up here.  FU for HD.  No med changes from cardiac perspective.       Subjective:   No complaints.  No CP.  No RAMSAY.  No SOB.       Vitals:   BP (!) 86/55   Pulse 85   Temp 97.4 °F (36.3 °C) (Temporal)   Resp 17   Ht 188 cm (6' 2\")   Wt 241 lb 11.2 oz (109.6 kg)   SpO2 93%   BMI 31.03 kg/m²   Wt Readings from Last 3 Encounters:   25 241 lb 11.2 oz (109.6 kg)   03/10/25 251 lb 1.6 oz (113.9 kg)   25 225 lb (102.1 kg)       Examination:  General: Well developed, well nourished male.  No distress.  Neck:  No JVD.  Normal ROM.  Cardiac: Regular rate and rhythm.  No murmurs, rubs, or gallops.   Lungs: Clear to ascultation bilaterally.    Abdomen: Soft.  Non-distended.  Non-tender.  Bowel sounds present.    Extremities: Warm, no significant edema.  Palpable pulses.  Neurologic: Alert and oriented.  No gross deficits.  Integument:  No visible rashes identified.  Psych: The patient's affect is appropriate.     Labs:   Recent Labs   Lab 03/10/25  0544 25  2141 25  0355   WBC 6.7 7.2 6.1   HGB 7.8* 8.2*  7.9*   MCV 95.4 96.8 97.2   .0 212.0 172.0     Recent Labs   Lab 03/10/25  0544 03/14/25  2141 03/16/25  0355    138 139   K 4.5 4.1 4.0    100 100   CO2 24.0 27.0 30.0   BUN 56* 62* 43*   CREATSERUM 8.15* 6.01* 5.73*   GLU 97 132* 111*   CA 8.6* 9.0 8.7     Recent Labs   Lab 03/14/25 2141   ALT 65*   AST 60*   ALB 4.0     Lab Results   Component Value Date    A1C 4.6 03/08/2024    A1C 5.4 08/24/2023    A1C 5.0 04/06/2023     Lab Results   Component Value Date    CHOLEST 200 (H) 03/14/2025    HDL 56 03/14/2025     (H) 03/14/2025    TRIG 211 (H) 03/14/2025        Studies:   Ekg: Sinus.  LVH strain.  Similar to prior tracings (which vary greatly).  Tele: Stable.  Echo (1/2025):   1. Left ventricle: The cavity size was normal. Wall thickness was moderately increased. There was concentric hypertrophy. Systolic function was normal. The estimated ejection fraction was 60-65%, by visual assessment.   2. Left atrium: The left atrial volume was markedly increased.   3. Right atrium: The atrium was mildly dilated.   4. Mitral valve: A prosthetic device is present. The prosthesis had a normal range of motion. The sewing ring appeared normal, had no rocking motion, and showed no evidence of dehiscence. Leaflet separation was mildly      reduced. Transvalvular velocity was increased. The findings were consistent with mild stenosis. There was moderate regurgitation. The mean diastolic gradient was 10mm Hg at a heart rate 108 bpm.     Thank you for allowing [facility] to care for your patient. Please contact me with any questions!     Benja Reyes MD  General, Interventional  Structural & Endovascular  Cardiology

## 2025-03-16 NOTE — PLAN OF CARE
Assumed patient care at 0730.  Vital signs stable.  Patient alert and oriented x 4.  Patient still with pain in his abdomen.  Eating full meals, no nausea.  Discharge orders received.  IVs and tele discontinued.  Patient discharged home after verbalizing understanding of discharge instructions.    Problem: CARDIOVASCULAR - ADULT  Goal: Maintains optimal cardiac output and hemodynamic stability  Description: INTERVENTIONS:- Monitor vital signs, rhythm, and trends- Monitor for bleeding, hypotension and signs of decreased cardiac output- Evaluate effectiveness of vasoactive medications to optimize hemodynamic stability- Monitor arterial and/or venous puncture sites for bleeding and/or hematoma- Assess quality of pulses, skin color and temperature- Assess for signs of decreased coronary artery perfusion - ex. Angina- Evaluate fluid balance, assess for edema, trend weights  Outcome: Adequate for Discharge  Goal: Absence of cardiac arrhythmias or at baseline  Description: INTERVENTIONS:- Continuous cardiac monitoring, monitor vital signs, obtain 12 lead EKG if indicated- Evaluate effectiveness of antiarrhythmic and heart rate control medications as ordered- Initiate emergency measures for life threatening arrhythmias- Monitor electrolytes and administer replacement therapy as ordered  Outcome: Adequate for Discharge     Problem: PAIN - ADULT  Goal: Verbalizes/displays adequate comfort level or patient's stated pain goal  Description: INTERVENTIONS:- Encourage pt to monitor pain and request assistance- Assess pain using appropriate pain scale- Administer analgesics based on type and severity of pain and evaluate response- Implement non-pharmacological measures as appropriate and evaluate response- Consider cultural and social influences on pain and pain management- Manage/alleviate anxiety- Utilize distraction and/or relaxation techniques- Monitor for opioid side effects- Notify MD/LIP if interventions unsuccessful or  patient reports new pain- Anticipate increased pain with activity and pre-medicate as appropriate  Outcome: Adequate for Discharge     Problem: RISK FOR INFECTION - ADULT  Goal: Absence of fever/infection during anticipated neutropenic period  Description: INTERVENTIONS- Monitor WBC- Administer growth factors as ordered- Implement neutropenic guidelines  Outcome: Adequate for Discharge     Problem: SAFETY ADULT - FALL  Goal: Free from fall injury  Description: INTERVENTIONS:- Assess pt frequently for physical needs- Identify cognitive and physical deficits and behaviors that affect risk of falls.- [employer] fall precautions as indicated by assessment.- Educate pt/family on patient safety including physical limitations- Instruct pt to call for assistance with activity based on assessment- Modify environment to reduce risk of injury- Provide assistive devices as appropriate- Consider OT/PT consult to assist with strengthening/mobility- Encourage toileting schedule  Outcome: Adequate for Discharge     Problem: DISCHARGE PLANNING  Goal: Discharge to home or other facility with appropriate resources  Description: INTERVENTIONS:- Identify barriers to discharge w/pt and caregiver- Include patient/family/discharge partner in discharge planning- Arrange for needed discharge resources and transportation as appropriate- Identify discharge learning needs (meds, wound care, etc)- Arrange for interpreters to assist at discharge as needed- Consider post-discharge preferences of patient/family/discharge partner- Complete POLST form as appropriate- Assess patient's ability to be responsible for managing their own health- Refer to Case Management Department for coordinating discharge planning if the patient needs post-hospital services based on physician/LIP order or complex needs related to functional status, cognitive ability or social support system  Outcome: Adequate for Discharge     Problem: Altered Communication/Language  Barrier  Goal: Patient/Family is able to understand and participate in their care  Description: Interventions:- Assess communication ability and preferred communication style- Implement communication aides and strategies- Use visual cues when possible- Listen attentively, be patient, do not interrupt- Minimize distractions- Allow time for understanding and response- Establish method for patient to ask for assistance (call light)- Provide an  as needed- Communicate barriers and strategies to overcome with those who interact with patient  Outcome: Adequate for Discharge

## 2025-03-16 NOTE — PROGRESS NOTES
[facility]   part of [facility]    Nephrology Progress Note    Godwin Fonseca Attending:  Minna Costello DO     Cc: ESRD    SUBJECTIVE     Sp HD yesterday, 3.4L UF, tolerated well, feels better today, dyspnea and BP improved     PHYSICAL EXAM   Vital signs: BP (!) 86/55   Pulse 85   Temp 97.4 °F (36.3 °C) (Temporal)   Resp 17   Ht 6' 2\" (1.88 m)   Wt 241 lb 11.2 oz (109.6 kg)   SpO2 93%   BMI 31.03 kg/m²   Temp (24hrs), Av.6 °F (36.4 °C), Min:97.4 °F (36.3 °C), Max:97.8 °F (36.6 °C)       Intake/Output Summary (Last 24 hours) at 3/16/2025 1615  Last data filed at 3/16/2025 0800  Gross per 24 hour   Intake 480 ml   Output 300 ml   Net 180 ml     Wt Readings from Last 3 Encounters:   25 241 lb 11.2 oz (109.6 kg)   03/10/25 251 lb 1.6 oz (113.9 kg)   25 225 lb (102.1 kg)     General: NAD  HEENT: NCAT, EOMI, MMM  Neck: Supple   Cardiac: Regular rate and rhythm   Lungs: CTAB  Abdomen: Soft, non-tender, nondistended   Extremities: No edema  Neurologic: No asterxis  Skin: Warm and dry, no rashes     MEDS     Current Facility-Administered Medications   Medication Dose Route Frequency    ARIPiprazole (Abilify) tab 15 mg  15 mg Oral Daily    atorvastatin (Lipitor) tab 20 mg  20 mg Oral QPM    buPROPion ER (Wellbutrin XL) 24 hr tab 150 mg  150 mg Oral Daily    calcium acetate (Phoslo) cap 667 mg  667 mg Oral TID with meals    carvedilol (Coreg) tab 25 mg  25 mg Oral BID with meals    cloNIDine (Catapres) tab 0.1 mg  0.1 mg Oral BID    FLUoxetine (PROzac) cap 40 mg  40 mg Oral Daily    gabapentin (Neurontin) cap 200 mg  200 mg Oral TID    hydrALAZINE (Apresoline) tab 25 mg  25 mg Oral TID    lamoTRIgine (LaMICtal) tab 100 mg  100 mg Oral Daily    losartan (Cozaar) tab 100 mg  100 mg Oral Daily    NIFEdipine ER (Procardia-XL) 24 hr tab 30 mg  30 mg Oral Daily    tamsulosin (Flomax) cap 0.4 mg  0.4 mg Oral Daily    heparin (Porcine) 5000 UNIT/ML injection 5,000 Units  5,000 Units Subcutaneous Q8H  MARTHA    acetaminophen (Tylenol Extra Strength) tab 500 mg  500 mg Oral Q4H PRN    melatonin tab 3 mg  3 mg Oral Nightly PRN    polyethylene glycol (PEG 3350) (Miralax) 17 g oral packet 17 g  17 g Oral Daily PRN    sennosides (Senokot) tab 17.2 mg  17.2 mg Oral Nightly PRN    bisacodyl (Dulcolax) 10 MG rectal suppository 10 mg  10 mg Rectal Daily PRN    ondansetron (Zofran) 4 MG/2ML injection 4 mg  4 mg Intravenous Q6H PRN    prochlorperazine (Compazine) 10 MG/2ML injection 5 mg  5 mg Intravenous Q8H PRN    morphINE PF 2 MG/ML injection 1 mg  1 mg Intravenous Q2H PRN    Or    morphINE PF 2 MG/ML injection 2 mg  2 mg Intravenous Q2H PRN    Or    morphINE PF 4 MG/ML injection 4 mg  4 mg Intravenous Q2H PRN    sodium chloride 0.9 % IV bolus 100 mL  100 mL Intravenous Q30 Min PRN    And    albumin human (Albumin) 25% injection 25 g  25 g Intravenous PRN Dialysis    calcium carbonate (Tums) chewable tab 1,000 mg  1,000 mg Oral Q8H PRN    nitroGLYCERIN in dextrose 5% 50 mg/250mL infusion premix   mcg/min Intravenous Titrated       LABS     Lab Results   Component Value Date    WBC 6.1 03/16/2025    HGB 7.9 03/16/2025    HCT 24.2 03/16/2025    .0 03/16/2025    CREATSERUM 5.73 03/16/2025    BUN 43 03/16/2025     03/16/2025    K 4.0 03/16/2025     03/16/2025    CO2 30.0 03/16/2025     03/16/2025    CA 8.7 03/16/2025       IMAGING   All imaging studies personally reviewed.    CT ABDOMEN+PELVIS(CONTRAST ONLY)(CPT=74177)   Final Result   PROCEDURE:  CT ABDOMEN+PELVIS (CONTRAST ONLY) (CPT=74177)       COMPARISON:  [facility], CT, CT ABDOMEN+PELVIS(CONTRAST ONLY)(CPT=74177),    3/08/2025, 11:09 PM.  [facility] , CT, CT ABDOMEN+PELVIS(CONTRAST    ONLY)(CPT=74177), 3/05/2025, 12:59 PM.  [facility], CT, CT    ABDOMEN+PELVIS(CPT=74176), 1/06/2025, 6:25 AM.  [facility],    CT, CT ABDOMEN+PELVIS(CONTRAST ONLY)(CPT=74177), 12/25/2024, 5:09 PM.       INDICATIONS:  abd pain s/p colonoscopy       TECHNIQUE:  CT  scanning was performed from the dome of the diaphragm to    the pubic symphysis with non-ionic intravenous contrast material. Post    contrast coronal MPR imaging was performed.  Dose reduction techniques    were used. Dose information is    transmitted to the ACR (American College of Radiology) NRDR (National    Radiology Data Registry) which includes the Dose Index Registry.       PATIENT STATED HISTORY:(As transcribed by Technologist)   abd pain s/p    colonoscopy         CONTRAST USED:  100cc of Isovue 370       FINDINGS:     LIVER:  No enlargement, atrophy, abnormal density, or significant focal    lesion.     BILIARY:  No visible dilatation or calcification.     PANCREAS:  No lesion, fluid collection, ductal dilatation, or atrophy.     SPLEEN:  No enlargement or focal lesion.     KIDNEYS:  No mass, obstruction, or calcification.     ADRENALS:  No mass or enlargement.     AORTA/VASCULAR:  No aneurysm or dissection.     RETROPERITONEUM:  No mass or adenopathy.     BOWEL/MESENTERY:  Diverticular disease throughout the colon without    evidence of diverticulitis.  The appendix is normal.  The visualized small    bowel is unremarkable.  There is no abscess or free air.   ABDOMINAL WALL:  No mass or hernia.     URINARY BLADDER:  Urinary bladder demonstrates wall thickening which can    be seen with cystitis and correlation with urinalysis recommended.   PELVIC NODES:  No adenopathy.     PELVIC ORGANS:  No visible mass.  Pelvic organs appropriate for patient    age.     BONES:  No bony lesion or fracture.  Moderate degenerative disc disease at    L4-5 and L5-S1.   LUNG BASES:  Trace chronic fluid at the right lung base and pleural    thickening with scarring and atelectasis at the right lung base.   OTHER:  Negative.                           =====   CONCLUSION:         1. Diverticulosis of the colon without evidence of diverticulitis.       2. Bladder wall thickening is chronic and stable can be seen with     cystitis.  Correlation with urinalysis recommended.       3. Stable chronic pleural thickening in fluid as well as scarring at the    right lung base.           LOCATION:  [facility]           Dictated by (CST): Jaycob Cassidy MD on 3/15/2025 at 0:32 AM        Finalized by (CST): Jaycob Cassidy MD on 3/15/2025 at 0:36 AM         XR CHEST AP PORTABLE  (CPT=71045)   Final Result   PROCEDURE:  XR CHEST AP PORTABLE  (CPT=71045)       TECHNIQUE:  AP chest radiograph was obtained.       COMPARISON:  [facility], XR, XR CHEST AP PORTABLE  (CPT=71045), 1/07/2025,    3:01 AM.  [facility], CT, CT ABDOMEN+PELVIS(CPT=74176), 1/06/2025, 6:25    AM.  [facility], CT, CT ABDOMEN+PELVIS(CONTRAST ONLY)(CPT=74177),    3/08/2025, 11:09 PM.  [facility] , XR, XR    CHEST AP PORTABLE  (CPT=71045), 1/18/2025, 10:06 AM.  [facility], CT, CT    ABDOMEN+PELVIS(CONTRAST ONLY)(CPT=74177), 3/08/2025, 11:09 [facility],    XR, XR CHEST AP PORTABLE  (CPT=71045), 3/04/2025, 5:40 PM.       INDICATIONS:  shortness of breath       PATIENT STATED HISTORY: (As transcribed by Technologist)  Patient here    with shortness of breath, states he feels like fluid overload. Dialysis    three times this week. Patient has abdominal pain. Also complains of    rectal bleeding.            FINDINGS:  Patchy airspace opacity at the right lung base has been seen on    multiple previous x-rays may represent areas of scarring or atelectasis.     There may be small amount of superimposed pneumonitis.  There is no    definite pleural effusion.  The left    lung is clear.  Heart is normal in size.  Cardiac valve is stable.                         =====   CONCLUSION:  Mild patchy airspace opacity at the right lung base some of    which is chronic representing scarring or atelectasis with some increased    overall opacity could represent superimposed pneumonitis or pneumonia.           LOCATION:  [facility]                       Dictated by (CST): Jaycob Cassidy MD on 3/14/2025 at 10:40  PM        Finalized by (CST): Jaycob Cassidy MD on 3/14/2025 at 10:42 PM               ASSESSMENT & PLAN   44 year old male w ho ESRD on iHD MWF via LUE AVF, HTN, severe MR s/p MVR x 2, HFpEF, DVT/PE, TIA, MGUS, bipolar disorder, recurrent GI bleed, diffuse body pains, recent rectal bleeding who now presents SOB     ESRD:  -- sp extra HD yesterday. Plan for next HD tomorrow UF as tolerated  -- renally dose meds  -- avoid morphine and type 1 pavan     Anemia:   -- epo with HD once BPs improved     MBD / Hyperphosphatemia:  -- sevelamer 2400 TID AC  -- calcium acetate 667 TIDAC  -- low phos diet     HTN urgency / ho MV repair  -- sp NTG drip per cards. UF w HD. Home BP meds   PTA he had coreg and losartan pre-HD  take clonidine should be continued BID to avoid rebound  Typically takes remaining antihypertensives after HD unless SBP>180     LUE AVF:  -- L arm precautions    Ok to discharge from nephrology. Pt to go to HD tomorrow.   D/w RN    Thank you for allowing me to participate in the care of this patient. Please do not hesitate to call with questions or concerns.       Samaria Nava MD  [facility]ph[facility]

## 2025-03-16 NOTE — PLAN OF CARE
Assumed care of pt. At 1930. Pt. A & O x4. VSS. KONG. Denies of any SOB overnight. Continue to c/o abdominal pain, relieved w/ PRN medication. Voiding. Tolerating PO intake. Will continue to monitor closely.

## 2025-03-17 NOTE — PAYOR COMM NOTE
--------------  ADMISSION REVIEW     Payor: [employer]  Subscriber #:  [id number]  Authorization Number: [id number]    Admit date: 3/15/25  Admit time:  2:55 AM       REVIEW DOCUMENTATION:    ED Provider Notes signed by Gama Ray DO at 3/15/2025  4:11 AM        Patient Seen in: [facility]      History     Chief Complaint   Patient presents with    Dizziness     Short of breathe, dizziness    Difficulty Breathing     Stated Complaint: Hypertensive urgency    Subjective:   HPI      This is a 44-year-old male with history of end-stage renal disease on hemodialysis Monday Wednesday Friday presents emergency room for evaluation of shortness of breath, states he feels like he is \"fluid overloaded \".  Patient states symptoms started a few days ago while he was admitted to the hospital, states after drinking prep for colonoscopy he started feeling some shortness of breath.  Patient on review of medical records was admitted for bright red blood per rectum, had colonoscopy which did reveal hemorrhoids as the etiology.  Patient reports he did have dialysis today but still feels he is fluid overloaded, feels some heaviness in the chest, denies associated lightheaded dizzy nausea or diaphoresis.  Patient also complains of some abdominal discomfort did notice some blood per rectum tonight as well.  Denies fevers or chills    Objective:     Past Medical History:    Anxiety state    Arrhythmia    Asthma (HCC)    Attention deficit hyperactivity disorder (ADHD)    Back problem    Bipolar 1 disorder (HCC)    CKD (chronic kidney disease) stage 3, GFR 30-59 ml/min (Formerly [facility])    Dr Meeks    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HCC)    Congestive heart disease (HCC)    COPD (chronic obstructive pulmonary disease) (Formerly [facility])    Coronary atherosclerosis    Deep vein thrombosis (Formerly [facility])    at age 17 R/T cast    Depression    Diabetes (Formerly [facility])    Dialysis patient    Diverticulosis of large intestine    Essential hypertension    3/21  echo: severe concentric LVH with normal EF and no MR or pHTN    Extrinsic asthma, unspecified    Heart attack (HCC)    2016- angiogram- no intervention    Heart valve disease    mitral valve repair in 1994/    High blood pressure    High cholesterol    History of blood transfusion    History of mitral valve repair    Hyperlipidemia    Low back pain    tight and stiff after sweeping and mopping    LVH (left ventricular hypertrophy)    Migraines    Mixed hyperlipidemia     HDL 38 LDL 97 VLDL 57     Monoclonal gammopathy    IgG kappa     Muscle weakness    MVP (mitral valve prolapse)    Repair 1994 at [place]; echoes as recently as 3/21 show mild or trivial MR and no stenosis    Neuropathy    Osteoarthritis    hip ,knees    Pneumonia due to organism    Pulmonary embolism (HCC)    Renal disorder    Stroke (HCC)    TIA (transient ischemic attack)    Initial history of left-sided weakness and slurred speech. (+) cocaine. MRI of the brain, CT angiogram of the head and neck, and 2D echo are all unremarkable.     TMJ (dislocation of temporomandibular joint)    Troponin level elevated    Trop 60 60 47 with TIA and no CP: Lexiscan negative with EF 51    Visual impairment       Physical Exam     ED Triage Vitals [03/14/25 2126]   /90   Pulse 106   Resp (!) 30   Temp 98.9 °F (37.2 °C)   Temp src Oral   SpO2 97 %   O2 Device None (Room air)       Current Vitals:   Vital Signs  BP: (!) 149/103  Pulse: 101  Resp: 23  Temp: 97.3 °F (36.3 °C)  Temp src: Temporal  MAP (mmHg): (!) 117    Oxygen Therapy  SpO2: 98 %  O2 Device: None (Room air)  Pulse Oximetry Type: Continuous  Oximetry Probe Site Changed: No  Pulse Ox Probe Location: Left hand        Physical Exam  GENERAL: Patient is awake, alert  HEENT: no scleral icterus.  Mucous membranes are moist, oropharynx is clear  NECK: Neck is supple, there is no nuchal rigidity.  HEART: Regular rate and rhythm, no murmurs.  LUNGS: Decreased breath sounds at the bases with  fine rales.  No wheezing rhonchi or stridor  ABDOMEN: Soft, nondistended, mild diffuse tender, bowel sounds are present, no rebound, no rigidity, no guarding.no pulsatile masses. No CVA tenderness  EXTREMITIES: No peripheral edema, no calf tenderness      ED Course     Labs Reviewed   COMP METABOLIC PANEL (14) - Abnormal; Notable for the following components:       Result Value    Glucose 132 (*)     BUN 62 (*)     Creatinine 6.01 (*)     Calculated Osmolality 305 (*)     eGFR-Cr 11 (*)     AST 60 (*)     ALT 65 (*)     All other components within normal limits   CBC WITH DIFFERENTIAL WITH PLATELET - Abnormal; Notable for the following components:    RBC 2.48 (*)     HGB 8.2 (*)     HCT 24.0 (*)     All other components within normal limits   TROPONIN I HIGH SENSITIVITY - Abnormal; Notable for the following components:    Troponin I (High Sensitivity) 396 (*)     All other components within normal limits   PRO BETA NATRIURETIC PEPTIDE - Abnormal; Notable for the following components:    Pro-Beta Natriuretic Peptide 25,022 (*)     All other components within normal limits   LIPID PANEL - Abnormal; Notable for the following components:    Cholesterol, Total 200 (*)     Triglycerides 211 (*)     LDL Cholesterol 108 (*)     VLDL 36 (*)     Non HDL Chol 144 (*)     All other components within normal limits   POCT GLUCOSE - Abnormal; Notable for the following components:    POC Glucose 149 (*)     All other components within normal limits   POCT FLU TEST - Normal    Narrative:     This assay is a rapid molecular in vitro test utilizing nucleic acid amplification of influenza A and B viral RNA.   RAPID SARS-COV-2 BY PCR - Normal   TROPONIN I HIGH SENSITIVITY   RAINBOW DRAW BLUE     EKG    Rate, intervals and axes as noted on EKG Report.  Rate: 103  Rhythm: Sinus Rhythm  Reading: Sinus tachycardia.  No ST elevation.        A total of 40 minutes of critical care time (exclusive of billable procedures) was administered to  manage the patient's cardiovascular instability due to his hypertensive urgency requiring admission to the CNICU.  This involved direct patient intervention, complex decision making, and/or extensive discussions with the patient, family, and clinical staff.          Differential diagnosis before testing includes but not limited to hypertensive emergency, hypertensive urgency, CHF/pulmonary edema, pneumonia, pneumothorax, perforated viscus, which is a medical condition that poses a threat to life/function    Past Medical History/comorbidities-as noted in HPI      Radiographic images  I personally reviewed the radiographs and my individual interpretation shows chest x-ray, no pneumothorax  I also reviewed the official reports that showed CT abdomen   CONCLUSION:       1. Diverticulosis of the colon without evidence of diverticulitis.     2. Bladder wall thickening is chronic and stable can be seen with cystitis.  Correlation with urinalysis recommended.     3. Stable chronic pleural thickening in fluid as well as scarring at the right lung base.        Chest x-ray: Mild patchy airspace opacity at the right lung base some of which is chronic representing scarring or atelectasis with some increased overall opacity could represent superimposed pneumonitis or pneumonia.       Discussion of management (consult/physicians, social work, pharmacy,ect) nephrology Dr. Nava, mary hospitalist, mary cardiology     External chart review included recent hospitalization medical records reviewed as in [facility]    Medications Provided: IV nitroglycerin, IV hydralazine    Course of Events during Emergency Room Visit include IV established, patient placed on cardiac monitor and pulse ox, patient was started on IV nitro, chest x-ray performed.  CBC white count 7.2 hemoglobin 8.2 platelet 212.  COVID and influenza testing were negative.  BMP elevated 25,022, troponin elevated 396.  Chemistry sodium 138 potassium 4.1 BUN 62 creatinine  6.21 glucose 132.  CT M/pelvis performed without acute finding.  Discussed with nephrology cardiology and hospitalist, patient will be transferred from [place] will require admission to the  ICU as we are titrating the nitro and has required dose of hydralazine as well.  I discussed all results with the patient, patient agrees with plan    Shared decision making was utilized           Disposition:    Admission  I have discussed with the patient the results of test, differential diagnosis, and treatment plan. They expressed clear understanding of these instructions and agrees to the plan provided.       Note to patient: The 21st Century Cures Act makes medical notes like these available to patients in the interest of transparency. However, this is a medical document intended as peer to peer communication. It is written in medical language and may contain abbreviations or verbiage that are unfamiliar. It may appear blunt or direct. Medical documents are intended to carry relevant information, facts as evident, and the clinical opinion of the practitioner.             Admission disposition: 3/15/2025  1:26 AM    Disposition and Plan     Clinical Impression:  1. Hypervolemia, unspecified hypervolemia type    2. ESRD needing dialysis (HCC)    3. Hypertensive urgency    4. Anemia, unspecified type         Disposition:  Admit  3/15/2025  1:26 am     Hospital Problems       Present on Admission  Date Reviewed: 1/18/2025            ICD-10-CM Noted POA    * (Principal) Hypervolemia, unspecified hypervolemia type E87.70 8/30/2024 Unknown    ESRD needing dialysis (HCC) N18.6, Z99.2 11/23/2023 Unknown    Hypertensive urgency I16.0 11/29/2020 Unknown                 3/15 Cardiology    Impression:  HTN urgency  Admission BP >200mmHg systolic  Longstanding HTN  On 5 drugs as outpatient  Currently controlled with NTG gtt; awaiting HD  Dyspnea/pulmonary edema  Due to volume from lack of HD/fluid removal  BRBPR  Recent  colonoscopy (Monday) with polyps  Hgb stable at 8.2  Dyslipidemia  On statin  ESRD  HD dependent  Planning today  Nephrology following  Hx MV repair  Surgery years ago  Echo with moderate MR 1/2025 and mild to moderate MS (10mmHg gradient at )     Plan:  Continue current BP meds and NTG drip until he gets HD.  His BP will come down with dialysis.  May be hypotensive if dialyzed to his true \"dry weight\".  Echo to check MR severity and MS gradient given it's increase noted on last echo.  Continue outpatient meds.  Will follow in house.  Suspect he will feel dramatically better post dialysis.         Chief Complaint: HTN urgency     History of Present Illness: Godwin Fonseca is a(n) 44 year old male with hx MR repair and HD dependent renal failure who is well known to myself and the CCU staff.  He has had several prior admissions of dyspnea and HTN due to missed HD.      He notes he was undergoing eval for BRBPR.  He had colonoscopy done on Monday.  He had his standard dialysis appointments but they removed less fluid, knowing he was NPO and bowel prepping.  He could tell yesterday that he was volume overloaded.       He's had RAMSAY with mild exertion over the last day and now can feel \"heaviness\" in his chest.  This is the same sensation he has had with prior episodes of volume overload.  At baseline he can be active without any symptoms.  No CP, no RAMSAY, no syncope.     He has a headache.  He notes it's been present since yesterday.  No neuro symptoms, however.             Recent Labs   Lab 03/08/25  2214 03/09/25  0649 03/10/25  0544 03/14/25  2141   WBC 9.8 8.4 6.7 7.2   HGB 8.1* 8.0* 7.8* 8.2*   MCV 97.2 97.2 95.4 96.8   .0 210.0 189.0 212.0   INR 1.07  --   --   --              Recent Labs   Lab 03/08/25 2214 03/09/25 0649 03/10/25  0544 03/14/25  2141    143 140 138   K 4.1 4.2 4.5 4.1    104 105 100   CO2 24.0 26.0 24.0 27.0   BUN 51* 56* 56* 62*   CREATSERUM 7.66* 7.81* 8.15* 6.01*   GLU  97 89 97 132*   CA 8.8 8.9 8.6* 9.0   MG  --  2.0  --   --             Recent Labs   Lab 03/08/25  2214 03/09/25  0649 03/14/25  2141   ALT 38 32 65*   AST 29 30 60*   ALB 3.9 4.0 4.0             3/15 Nephrology    REASON FOR CONSULT:      ESRD     HISTORY OF PRESENT ILLNESS:      Godwin Fonseca is a a(n) 44 year old male w ho ESRD on iHD MWF via LUE AVF, HTN, severe MR s/p MVR x 2, HFpEF, DVT/PE, TIA, MGUS, bipolar disorder, recurrent GI bleed, diffuse body pains, recent rectal bleeding who now presents SOB. States after last admit when he drank the colonoscopy prep felt increasing dyspnea and overloaded. Went to HD yesterday, they attempted UF 4L but per pt only able to get around 2. Had some vomiting and cramping.      Seen and examined on HD currently. UF set to 3.5L net. Tolerating well so far. No complaints.          ASSESSMENT/PLAN:   Godwin Fonseca is a a(n) 44 year old male w ho ESRD on iHD MWF via LUE AVF, HTN, severe MR s/p MVR x 2, HFpEF, DVT/PE, TIA, MGUS, bipolar disorder, recurrent GI bleed, diffuse body pains, recent rectal bleeding who now presents SOB     ESRD:  -- seen and examined on HD today. UF set to 3.5L net. Tolerating well so far. No complaints. HD directly managed  -- renally dose meds  -- avoid morphine and type 1 pavan     Anemia:   -- epo with HD once BPs improved     MBD / Hyperphosphatemia:  -- sevelamer 2400 TID AC  -- calcium acetate 667 TIDAC  -- low phos diet     HTN urgency / ho MV repair  -- on NTG drip per cards. UF w HD. Home BP meds  -- getting ECHO  PTA he had coreg and losartan pre-HD  take clonidine should be continued BID to avoid rebound  Typically takes remaining antihypertensives after HD unless SBP>180     LUE AVF:  -- L arm precautions            3/16 Cardiology    Impression:  HTN urgency  Admission BP >200mmHg systolic  Longstanding HTN  On 5 drugs as outpatient  Currently controlled with NTG gtt; awaiting HD  Dyspnea/pulmonary edema  Due to volume from lack of  HD/fluid removal  BRBPR  Recent colonoscopy (Monday) with polyps  Hgb stable at 8.2  Dyslipidemia  On statin  ESRD  HD dependent  Planning today  Nephrology following  Hx MV repair  Surgery years ago  Echo with moderate MR 1/2025 and mild to moderate MS (10mmHg gradient at )     Plan:  Ok to DC home.  Needs to FU with cardiology for outpatient echo and surveillance of mitral valve.  Can see [facility] cards in [place] or me up here.  FU for HD.  No med changes from cardiac perspective.         Subjective:   No complaints.  No CP.  No RAMSAY.  No SOB.         Vitals:   BP (!) 86/55   Pulse 85   Temp 97.4 °F (36.3 °C) (Temporal)   Resp 17   Ht 188 cm (6' 2\")   Wt 241 lb 11.2 oz (109.6 kg)   SpO2 93%   BMI 31.03 kg/m²       Wt Readings from Last 3 Encounters:   03/16/25 241 lb 11.2 oz (109.6 kg)   03/10/25 251 lb 1.6 oz (113.9 kg)   03/04/25 225 lb (102.1 kg)        Examination:  General: Well developed, well nourished male.  No distress.  Neck:  No JVD.  Normal ROM.  Cardiac: Regular rate and rhythm.  No murmurs, rubs, or gallops.   Lungs: Clear to ascultation bilaterally.    Abdomen: Soft.  Non-distended.  Non-tender.  Bowel sounds present.    Extremities: Warm, no significant edema.  Palpable pulses.  Neurologic: Alert and oriented.  No gross deficits.  Integument:  No visible rashes identified.  Psych: The patient's affect is appropriate.      Labs:         Recent Labs   Lab 03/10/25  0544 03/14/25 2141 03/16/25  0355   WBC 6.7 7.2 6.1   HGB 7.8* 8.2* 7.9*   MCV 95.4 96.8 97.2   .0 212.0 172.0            Recent Labs   Lab 03/10/25  0544 03/14/25 2141 03/16/25  0355    138 139   K 4.5 4.1 4.0    100 100   CO2 24.0 27.0 30.0   BUN 56* 62* 43*   CREATSERUM 8.15* 6.01* 5.73*   GLU 97 132* 111*   CA 8.6* 9.0 8.7               3/16 Nephrology    Sp HD yesterday, 3.4L UF, tolerated well, feels better today, dyspnea and BP improved       ESRD:  -- sp extra HD yesterday. Plan for next HD tomorrow UF  as tolerated  -- renally dose meds  -- avoid morphine and type 1 pavan     Anemia:   -- epo with HD once BPs improved     MBD / Hyperphosphatemia:  -- sevelamer 2400 TID AC  -- calcium acetate 667 TIDAC  -- low phos diet     HTN urgency / ho MV repair  -- sp NTG drip per cards. UF w HD. Home BP meds   PTA he had coreg and losartan pre-HD  take clonidine should be continued BID to avoid rebound  Typically takes remaining antihypertensives after HD unless SBP>180     LUE AVF:  -- L arm precautions     Medications 03/14/25 03/15/25 03/16/25              hydrALAzine (Apresoline) 20 mg/mL injection 10 mg  Dose: 10 mg  Freq: Once Route: IV  Start: 03/15/25 0124 End: 03/15/25 0126   Admin Instructions:   Titrate to maintain systolic BP within 140 & 160 , and diastolic within 90 & 100.     0126 CW-Given           hydrALAZINE (Apresoline) tab 25 mg  Dose: 25 mg  Freq: 3 times daily Route: OR  Indications of Use: HYPERTENSION  Start: 03/15/25 0900 End: 03/16/25 1944     0830 JM-Given   1700 JM-Given   2107 ALIA-Given      0811 SZ-Given   (1600 SZ)-Not Given [C]   1944-D/C'd      HYDROmorphone (Dilaudid) 1 MG/ML injection  Start: 03/14/25 2306 End: 03/15/25 1106   Admin Instructions:   Shwetha Tai: cabinet override    (2307 CW)-Not Given [C]   (2311 CW)-Not Given       1106-D/C'd         HYDROmorphone (Dilaudid) 1 MG/ML injection 0.5 mg  Dose: 0.5 mg  Freq: Once Route: IV  Start: 03/15/25 0030 End: 03/15/25 0037     0037 CW-Given           HYDROmorphone (Dilaudid) 1 MG/ML injection 0.5 mg  Dose: 0.5 mg  Freq: Once Route: IV  Start: 03/14/25 2302 End: 03/14/25 2302 2302 CW-Given                nitroGLYCERIN in dextrose 5% 50 mg/250mL infusion premix  Rate: 15-60 mL/hr Dose:  mcg/min  Freq: Titrated Route: IV  Last Dose: Stopped (03/15/25 1515)  Start: 03/14/25 2240 End: 03/16/25 1944   Admin Instructions:   Start at 50 mcg/min and increase by 10 mcg/min every 10 minutes up to 200 mcg/min prn CHF symptoms and SBP  greater than 100 mmHg.   Order specific questions:       2242 CW-New Bag   2321 CW-Rate/Dose Change   2331 CW-Rate/Dose Change     2358 CW-Rate/Dose Change        0010 CW-Rate/Dose Change   0022 CW-Rate/Dose Change   0040 CW-Rate/Dose Change     0216 CW-Handoff   0300 ALIA-Rate/Dose Change   0600 ALIA-Rate/Dose Verify     0630 ALIA-New Bag   0800 JM-Rate/Dose Verify   1100 JM-Rate/Dose Change     1334 JM-New Bag   1515 JM-Stopped       1944-D/C'd            Or   morphINE PF 2 MG/ML injection 2 mg  Dose: 2 mg  Freq: Every 2 hour PRN Route: IV  PRN Reason: moderate pain  Start: 03/15/25 0339 End: 03/16/25 1944   Admin Instructions:   Use PRN reason as a guide and follow range order policy. If oral pain meds are ordered and patient can tolerate oral intake, start with PRN oral pain medications first.        0903 JM-Given        1723 JM-Given   2028 ALIA-Given          0637 ALIA-Given   1302 SP-Given     1944-D/C'd        Or   morphINE PF 4 MG/ML injection 4 mg  Dose: 4 mg  Freq: Every 2 hour PRN Route: IV  PRN Reason: severe pain  Start: 03/15/25 0339 End: 03/16/25 1944   Admin Instructions:   Use PRN reason as a guide and follow range order policy. If oral pain meds are ordered and patient can tolerate oral intake, start with PRN oral pain medications first.     0405 ALIA-Given      1106 JM-Given               0124 ALIA-Given                  Vitals (last day) before discharge       Date/Time Temp Pulse Resp BP SpO2 Weight O2 Device O2 Flow Rate (L/min) [facility]    03/16/25 1615 -- 84 14 91/63 100 % -- -- --     03/16/25 1600 97.6 °F (36.4 °C) 84 17 86/65 97 % -- None (Room air) --     03/16/25 1500 -- 83 15 92/57 97 % -- -- --     03/16/25 1400 -- 88 22 97/53 99 % -- -- --     03/16/25 1300 -- 82 17 103/66 99 % -- -- -- SZ    03/16/25 1200 -- 82 18 93/53 100 % -- -- -- SZ    03/16/25 1100 -- 83 16 95/69 84 % -- -- -- SZ    03/16/25 1000 -- 85 17 86/55 93 % -- -- -- SZ    03/16/25 0900 -- 84 11 96/59 92 % -- -- -- SZ    03/16/25  0800 97.4 °F (36.3 °C) 97 22 146/99 97 % -- None (Room air) --     03/16/25 0700 -- 93 20 131/89 93 % -- -- -- ALIA    03/16/25 0600 -- 90 5 127/95 99 % 241 lb 11.2 oz (109.6 kg) -- -- ALIA    03/16/25 0500 -- 93 8 123/68 93 % -- -- -- ALIA    03/16/25 0430 97.8 °F (36.6 °C) 98 8 128/79 97 % -- None (Room air) -- ALIA    03/16/25 0400 -- 94 12 -- 97 % -- -- -- ALIA    03/16/25 0300 -- 86 16 121/67 97 % -- -- -- ALIA    03/16/25 0200 -- 91 17 125/109 100 % -- -- -- ALIA    03/16/25 0100 -- 87 12 136/82 96 % -- -- -- ALIA    03/16/25 0000 97.5 °F (36.4 °C) 94 14 147/73 93 % -- None (Room air) -- ALIA    03/15/25 2300 -- 96 14 140/86 94 % -- -- -- ALIA    03/15/25 2200 -- 91 15 123/100 93 % -- -- -- ALIA    03/15/25 2100 -- 92 10 134/82 94 % -- -- -- ALIA    03/15/25 2000 97.5 °F (36.4 °C) 88 15 103/87 98 % -- None (Room air) -- ALIA    03/15/25 1900 -- 94 22 136/98 95 % -- -- --     03/15/25 1800 -- 95 21 138/104 97 % -- -- --     03/15/25 1700 -- 93 23 131/84 98 % -- -- --     03/15/25 1600 97.7 °F (36.5 °C) 90 24 129/80 98 % -- Nasal cannula 4 L/min     03/15/25 1530 -- 94 24 110/94 98 % -- -- --     03/15/25 1515 -- 93 20 97/82 100 % -- -- --     03/15/25 1500 -- 90 26 122/60 96 % -- -- --     03/15/25 1400 -- 90 19 133/81 96 % -- -- --     03/15/25 1300 -- 86 18 143/87 99 % -- -- --     03/15/25 1200 97.8 °F (36.6 °C) 89 15 123/81 99 % -- Nasal cannula 4 L/min     03/15/25 1100 -- 91 25 128/79 99 % -- -- --     03/15/25 1000 -- 105 23 153/99 98 % -- -- --     03/15/25 1000 -- -- -- -- -- -- Nasal cannula 4 L/min     03/15/25 0947 -- 105 24 -- 100 % -- Nasal cannula 4 L/min     03/15/25 0934 -- -- -- -- -- 256 lb 13.4 oz (116.5 kg) -- --     03/15/25 0900 -- 104 26 143/88 94 % -- -- --     03/15/25 0800 97.5 °F (36.4 °C) 99 24 136/93 97 % -- None (Room air) --     03/15/25 0730 -- 102 15 148/97 97 % -- -- --     03/15/25 0700 -- 103 14 159/89 97 % -- -- --     03/15/25 0600 -- 102 19 158/107 94 % --  -- -- ALIA    03/15/25 0500 -- 100 14 159/126 96 % -- -- -- ALIA    03/15/25 0400 -- 101 23 149/103 98 % -- -- -- ALIA    03/15/25 0330 -- 105 34 148/131 95 % -- -- -- ALIA    03/15/25 0300 97.3 °F (36.3 °C) 105 22 153/116 97 % 247 lb 9.2 oz (112.3 kg) None (Room air) -- ALIA    03/15/25 0216 -- -- -- -- -- -- None (Room air) -- CW    03/15/25 0215 -- 104 18 166/104 99 % -- None (Room air) -- CW    03/15/25 0146 -- 109 17 159/118 98 % -- None (Room air) -- CW    03/15/25 0125 -- 103 18 172/120 100 % -- None (Room air) -- CW    03/15/25 0107 -- 104 20 165/126 99 % -- None (Room air) -- CW    03/15/25 0052 -- 101 19 170/113 100 % -- None (Room air) -- CW    03/15/25 0040 -- 105 18 173/118 99 % -- None (Room air) -- CW    03/15/25 0032 -- 105 18 162/117 100 % -- None (Room air) -- CW    03/15/25 0021 -- 103 18 160/116 100 % -- None (Room air) -- CW    03/15/25 0009 -- 105 24 169/117 98 % -- None (Room air) -- CW             --------------  DISCHARGE REVIEW    Payor: [employer]  Subscriber #:  [id number]  Authorization Number: [id number]    Admit date: 3/15/25  Admit time:   2:55 AM  Discharge Date: 3/16/2025  5:30 PM     Admitting Physician: Binu Singh DO  Attending Physician:  No att. providers found  Primary Care Physician: Adrian Arce MD        REVIEWER COMMENTS

## 2025-03-18 ENCOUNTER — APPOINTMENT (OUTPATIENT)
Dept: GENERAL RADIOLOGY | Age: 45
End: 2025-03-18
Attending: EMERGENCY MEDICINE
Payer: MEDICARE

## 2025-03-18 ENCOUNTER — HOSPITAL ENCOUNTER (OUTPATIENT)
Facility: HOSPITAL | Age: 45
Setting detail: OBSERVATION
Discharge: HOME | End: 2025-03-20
Attending: EMERGENCY MEDICINE | Admitting: INTERNAL MEDICINE
Payer: MEDICARE

## 2025-03-18 DIAGNOSIS — Z99.2 ESRD ON HEMODIALYSIS (HCC): ICD-10-CM

## 2025-03-18 DIAGNOSIS — D64.9 ANEMIA, UNSPECIFIED TYPE: ICD-10-CM

## 2025-03-18 DIAGNOSIS — R06.02 SHORTNESS OF BREATH: Primary | ICD-10-CM

## 2025-03-18 DIAGNOSIS — N18.9 CHRONIC KIDNEY DISEASE, UNSPECIFIED CKD STAGE: ICD-10-CM

## 2025-03-18 DIAGNOSIS — R79.89 ELEVATED TROPONIN: ICD-10-CM

## 2025-03-18 DIAGNOSIS — N18.6 ESRD ON HEMODIALYSIS (HCC): ICD-10-CM

## 2025-03-18 DIAGNOSIS — J18.9 PNEUMONIA OF RIGHT LOWER LOBE DUE TO INFECTIOUS ORGANISM: ICD-10-CM

## 2025-03-18 DIAGNOSIS — R79.89 ELEVATED BRAIN NATRIURETIC PEPTIDE (BNP) LEVEL: ICD-10-CM

## 2025-03-18 LAB
ALBUMIN SERPL-MCNC: 4 G/DL (ref 3.2–4.8)
ALBUMIN/GLOB SERPL: 1.2 {RATIO} (ref 1–2)
ALP LIVER SERPL-CCNC: 94 U/L
ALT SERPL-CCNC: 66 U/L
ANION GAP SERPL CALC-SCNC: 11 MMOL/L (ref 0–18)
AST SERPL-CCNC: 41 U/L (ref ?–34)
ATRIAL RATE: 93 BPM
BASOPHILS # BLD AUTO: 0.07 X10(3) UL (ref 0–0.2)
BASOPHILS NFR BLD AUTO: 1.4 %
BILIRUB SERPL-MCNC: 0.3 MG/DL (ref 0.3–1.2)
BUN BLD-MCNC: 67 MG/DL (ref 9–23)
CALCIUM BLD-MCNC: 8.5 MG/DL (ref 8.7–10.6)
CHLORIDE SERPL-SCNC: 105 MMOL/L (ref 98–112)
CO2 SERPL-SCNC: 21 MMOL/L (ref 21–32)
CREAT BLD-MCNC: 8.59 MG/DL
EGFRCR SERPLBLD CKD-EPI 2021: 7 ML/MIN/1.73M2 (ref 60–?)
EOSINOPHIL # BLD AUTO: 0.33 X10(3) UL (ref 0–0.7)
EOSINOPHIL NFR BLD AUTO: 6.7 %
ERYTHROCYTE [DISTWIDTH] IN BLOOD BY AUTOMATED COUNT: 15 %
GLOBULIN PLAS-MCNC: 3.3 G/DL (ref 2–3.5)
GLUCOSE BLD-MCNC: 165 MG/DL (ref 70–99)
GLUCOSE BLD-MCNC: 222 MG/DL (ref 70–99)
GLUCOSE BLD-MCNC: 95 MG/DL (ref 70–99)
HCT VFR BLD AUTO: 25.2 %
HGB BLD-MCNC: 8.3 G/DL
IMM GRANULOCYTES # BLD AUTO: 0.01 X10(3) UL (ref 0–1)
IMM GRANULOCYTES NFR BLD: 0.2 %
LACTATE SERPL-SCNC: 1 MMOL/L (ref 0.5–2)
LYMPHOCYTES # BLD AUTO: 1.1 X10(3) UL (ref 1–4)
LYMPHOCYTES NFR BLD AUTO: 22.2 %
MCH RBC QN AUTO: 32.8 PG (ref 26–34)
MCHC RBC AUTO-ENTMCNC: 32.9 G/DL (ref 31–37)
MCV RBC AUTO: 99.6 FL
MONOCYTES # BLD AUTO: 0.73 X10(3) UL (ref 0.1–1)
MONOCYTES NFR BLD AUTO: 14.7 %
NEUTROPHILS # BLD AUTO: 2.71 X10 (3) UL (ref 1.5–7.7)
NEUTROPHILS # BLD AUTO: 2.71 X10(3) UL (ref 1.5–7.7)
NEUTROPHILS NFR BLD AUTO: 54.8 %
NT-PROBNP SERPL-MCNC: 6357 PG/ML (ref ?–125)
OSMOLALITY SERPL CALC.SUM OF ELEC: 303 MOSM/KG (ref 275–295)
P AXIS: 34 DEGREES
P-R INTERVAL: 208 MS
PLATELET # BLD AUTO: 179 10(3)UL (ref 150–450)
POCT INFLUENZA A: NEGATIVE
POCT INFLUENZA B: NEGATIVE
POTASSIUM SERPL-SCNC: 4.4 MMOL/L (ref 3.5–5.1)
PROCALCITONIN SERPL-MCNC: 0.79 NG/ML (ref ?–0.05)
PROT SERPL-MCNC: 7.3 G/DL (ref 5.7–8.2)
Q-T INTERVAL: 388 MS
QRS DURATION: 108 MS
QTC CALCULATION (BEZET): 482 MS
R AXIS: 25 DEGREES
RBC # BLD AUTO: 2.53 X10(6)UL
SARS-COV-2 RNA RESP QL NAA+PROBE: NOT DETECTED
SODIUM SERPL-SCNC: 137 MMOL/L (ref 136–145)
T AXIS: 88 DEGREES
TROPONIN I SERPL HS-MCNC: 237 NG/L
TROPONIN I SERPL HS-MCNC: 245 NG/L
VENTRICULAR RATE: 93 BPM
WBC # BLD AUTO: 5 X10(3) UL (ref 4–11)

## 2025-03-18 PROCEDURE — 71045 X-RAY EXAM CHEST 1 VIEW: CPT | Performed by: EMERGENCY MEDICINE

## 2025-03-18 PROCEDURE — 87502 INFLUENZA DNA AMP PROBE: CPT | Performed by: EMERGENCY MEDICINE

## 2025-03-18 PROCEDURE — 80053 COMPREHEN METABOLIC PANEL: CPT | Performed by: EMERGENCY MEDICINE

## 2025-03-18 PROCEDURE — 84484 ASSAY OF TROPONIN QUANT: CPT | Performed by: EMERGENCY MEDICINE

## 2025-03-18 PROCEDURE — 99285 EMERGENCY DEPT VISIT HI MDM: CPT

## 2025-03-18 PROCEDURE — 82962 GLUCOSE BLOOD TEST: CPT

## 2025-03-18 PROCEDURE — 83605 ASSAY OF LACTIC ACID: CPT | Performed by: EMERGENCY MEDICINE

## 2025-03-18 PROCEDURE — 87040 BLOOD CULTURE FOR BACTERIA: CPT | Performed by: EMERGENCY MEDICINE

## 2025-03-18 PROCEDURE — 96365 THER/PROPH/DIAG IV INF INIT: CPT

## 2025-03-18 PROCEDURE — 94760 N-INVAS EAR/PLS OXIMETRY 1: CPT

## 2025-03-18 PROCEDURE — 83880 ASSAY OF NATRIURETIC PEPTIDE: CPT | Performed by: EMERGENCY MEDICINE

## 2025-03-18 PROCEDURE — 36415 COLL VENOUS BLD VENIPUNCTURE: CPT

## 2025-03-18 PROCEDURE — 96372 THER/PROPH/DIAG INJ SC/IM: CPT

## 2025-03-18 PROCEDURE — 86738 MYCOPLASMA ANTIBODY: CPT | Performed by: STUDENT IN AN ORGANIZED HEALTH CARE EDUCATION/TRAINING PROGRAM

## 2025-03-18 PROCEDURE — 93005 ELECTROCARDIOGRAM TRACING: CPT

## 2025-03-18 PROCEDURE — 85025 COMPLETE CBC W/AUTO DIFF WBC: CPT | Performed by: EMERGENCY MEDICINE

## 2025-03-18 PROCEDURE — 84145 PROCALCITONIN (PCT): CPT | Performed by: STUDENT IN AN ORGANIZED HEALTH CARE EDUCATION/TRAINING PROGRAM

## 2025-03-18 PROCEDURE — 93010 ELECTROCARDIOGRAM REPORT: CPT

## 2025-03-18 RX ORDER — HYDROMORPHONE HYDROCHLORIDE 1 MG/ML
0.6 INJECTION, SOLUTION INTRAMUSCULAR; INTRAVENOUS; SUBCUTANEOUS EVERY 5 MIN PRN
Status: DISCONTINUED | OUTPATIENT
Start: 2025-03-18 | End: 2025-03-18

## 2025-03-18 RX ORDER — IPRATROPIUM BROMIDE AND ALBUTEROL SULFATE 2.5; .5 MG/3ML; MG/3ML
3 SOLUTION RESPIRATORY (INHALATION) EVERY 4 HOURS PRN
Status: DISCONTINUED | OUTPATIENT
Start: 2025-03-18 | End: 2025-03-20

## 2025-03-18 RX ORDER — LABETALOL HYDROCHLORIDE 5 MG/ML
5 INJECTION, SOLUTION INTRAVENOUS EVERY 5 MIN PRN
Status: DISCONTINUED | OUTPATIENT
Start: 2025-03-18 | End: 2025-03-18

## 2025-03-18 RX ORDER — PROCHLORPERAZINE EDISYLATE 5 MG/ML
5 INJECTION INTRAMUSCULAR; INTRAVENOUS EVERY 8 HOURS PRN
Status: DISCONTINUED | OUTPATIENT
Start: 2025-03-18 | End: 2025-03-20

## 2025-03-18 RX ORDER — LEVOFLOXACIN 750 MG/1
750 TABLET, FILM COATED ORAL DAILY
Qty: 10 TABLET | Refills: 0 | Status: SHIPPED | OUTPATIENT
Start: 2025-03-18 | End: 2025-03-20

## 2025-03-18 RX ORDER — HYDROMORPHONE HYDROCHLORIDE 1 MG/ML
0.4 INJECTION, SOLUTION INTRAMUSCULAR; INTRAVENOUS; SUBCUTANEOUS EVERY 5 MIN PRN
Status: DISCONTINUED | OUTPATIENT
Start: 2025-03-18 | End: 2025-03-18

## 2025-03-18 RX ORDER — CALCIUM ACETATE 667 MG/1
667 CAPSULE ORAL
Status: DISCONTINUED | OUTPATIENT
Start: 2025-03-18 | End: 2025-03-20

## 2025-03-18 RX ORDER — ONDANSETRON 2 MG/ML
4 INJECTION INTRAMUSCULAR; INTRAVENOUS EVERY 6 HOURS PRN
Status: DISCONTINUED | OUTPATIENT
Start: 2025-03-18 | End: 2025-03-20

## 2025-03-18 RX ORDER — SODIUM CHLORIDE, SODIUM LACTATE, POTASSIUM CHLORIDE, CALCIUM CHLORIDE 600; 310; 30; 20 MG/100ML; MG/100ML; MG/100ML; MG/100ML
INJECTION, SOLUTION INTRAVENOUS CONTINUOUS
Status: DISCONTINUED | OUTPATIENT
Start: 2025-03-18 | End: 2025-03-18

## 2025-03-18 RX ORDER — HYDROCODONE BITARTRATE AND ACETAMINOPHEN 5; 325 MG/1; MG/1
2 TABLET ORAL ONCE AS NEEDED
Status: ACTIVE | OUTPATIENT
Start: 2025-03-18 | End: 2025-03-18

## 2025-03-18 RX ORDER — ACETAMINOPHEN 325 MG/1
650 TABLET ORAL EVERY 4 HOURS PRN
Status: DISCONTINUED | OUTPATIENT
Start: 2025-03-18 | End: 2025-03-20

## 2025-03-18 RX ORDER — PROCHLORPERAZINE EDISYLATE 5 MG/ML
5 INJECTION INTRAMUSCULAR; INTRAVENOUS EVERY 8 HOURS PRN
Status: DISCONTINUED | OUTPATIENT
Start: 2025-03-18 | End: 2025-03-18

## 2025-03-18 RX ORDER — AZITHROMYCIN 250 MG/1
500 TABLET, FILM COATED ORAL
Status: DISCONTINUED | OUTPATIENT
Start: 2025-03-18 | End: 2025-03-18

## 2025-03-18 RX ORDER — CLONIDINE HYDROCHLORIDE 0.1 MG/1
0.1 TABLET ORAL 2 TIMES DAILY
Status: DISCONTINUED | OUTPATIENT
Start: 2025-03-18 | End: 2025-03-20

## 2025-03-18 RX ORDER — TAMSULOSIN HYDROCHLORIDE 0.4 MG/1
0.4 CAPSULE ORAL DAILY
Status: DISCONTINUED | OUTPATIENT
Start: 2025-03-18 | End: 2025-03-20

## 2025-03-18 RX ORDER — LISDEXAMFETAMINE DIMESYLATE 30 MG/1
60 CAPSULE ORAL EVERY MORNING
Status: DISCONTINUED | OUTPATIENT
Start: 2025-03-18 | End: 2025-03-20

## 2025-03-18 RX ORDER — LOSARTAN POTASSIUM 100 MG/1
100 TABLET ORAL DAILY
Status: DISCONTINUED | OUTPATIENT
Start: 2025-03-18 | End: 2025-03-20

## 2025-03-18 RX ORDER — DEXTROSE MONOHYDRATE 25 G/50ML
50 INJECTION, SOLUTION INTRAVENOUS
Status: DISCONTINUED | OUTPATIENT
Start: 2025-03-18 | End: 2025-03-18

## 2025-03-18 RX ORDER — ACETAMINOPHEN 500 MG
500 TABLET ORAL EVERY 4 HOURS PRN
Status: DISCONTINUED | OUTPATIENT
Start: 2025-03-18 | End: 2025-03-20

## 2025-03-18 RX ORDER — HYDRALAZINE HYDROCHLORIDE 25 MG/1
25 TABLET, FILM COATED ORAL 3 TIMES DAILY
Status: DISCONTINUED | OUTPATIENT
Start: 2025-03-18 | End: 2025-03-20

## 2025-03-18 RX ORDER — HYDROCODONE BITARTRATE AND ACETAMINOPHEN 5; 325 MG/1; MG/1
1 TABLET ORAL ONCE AS NEEDED
Status: ACTIVE | OUTPATIENT
Start: 2025-03-18 | End: 2025-03-18

## 2025-03-18 RX ORDER — ERGOCALCIFEROL 1.25 MG/1
50000 CAPSULE, LIQUID FILLED ORAL WEEKLY
Status: DISCONTINUED | OUTPATIENT
Start: 2025-03-18 | End: 2025-03-20

## 2025-03-18 RX ORDER — ACETAMINOPHEN 500 MG
1000 TABLET ORAL ONCE
Status: COMPLETED | OUTPATIENT
Start: 2025-03-18 | End: 2025-03-18

## 2025-03-18 RX ORDER — TRAMADOL HYDROCHLORIDE 50 MG/1
50 TABLET ORAL ONCE
Status: COMPLETED | OUTPATIENT
Start: 2025-03-18 | End: 2025-03-18

## 2025-03-18 RX ORDER — SODIUM CHLORIDE 9 MG/ML
INJECTION, SOLUTION INTRAVENOUS CONTINUOUS
Status: DISCONTINUED | OUTPATIENT
Start: 2025-03-18 | End: 2025-03-18

## 2025-03-18 RX ORDER — ALBUMIN (HUMAN) 12.5 G/50ML
25 SOLUTION INTRAVENOUS
Status: ACTIVE | OUTPATIENT
Start: 2025-03-18 | End: 2025-03-20

## 2025-03-18 RX ORDER — MIDAZOLAM HYDROCHLORIDE 1 MG/ML
1 INJECTION INTRAMUSCULAR; INTRAVENOUS EVERY 5 MIN PRN
Status: DISCONTINUED | OUTPATIENT
Start: 2025-03-18 | End: 2025-03-18

## 2025-03-18 RX ORDER — ACETAMINOPHEN 500 MG
1000 TABLET ORAL ONCE AS NEEDED
Status: ACTIVE | OUTPATIENT
Start: 2025-03-18 | End: 2025-03-18

## 2025-03-18 RX ORDER — BUPROPION HYDROCHLORIDE 150 MG/1
150 TABLET ORAL DAILY
Status: DISCONTINUED | OUTPATIENT
Start: 2025-03-18 | End: 2025-03-20

## 2025-03-18 RX ORDER — NICOTINE POLACRILEX 4 MG
30 LOZENGE BUCCAL
Status: DISCONTINUED | OUTPATIENT
Start: 2025-03-18 | End: 2025-03-18

## 2025-03-18 RX ORDER — SEVELAMER CARBONATE 800 MG/1
2400 TABLET, FILM COATED ORAL
Status: DISCONTINUED | OUTPATIENT
Start: 2025-03-18 | End: 2025-03-20

## 2025-03-18 RX ORDER — ARIPIPRAZOLE 5 MG/1
5 TABLET ORAL DAILY
Status: DISCONTINUED | OUTPATIENT
Start: 2025-03-18 | End: 2025-03-20

## 2025-03-18 RX ORDER — ATORVASTATIN CALCIUM 20 MG/1
20 TABLET, FILM COATED ORAL EVERY EVENING
Status: DISCONTINUED | OUTPATIENT
Start: 2025-03-18 | End: 2025-03-20

## 2025-03-18 RX ORDER — DICYCLOMINE HYDROCHLORIDE 10 MG/ML
10 INJECTION INTRAMUSCULAR ONCE
Status: COMPLETED | OUTPATIENT
Start: 2025-03-18 | End: 2025-03-18

## 2025-03-18 RX ORDER — HEPARIN SODIUM 1000 [USP'U]/ML
1.5 INJECTION, SOLUTION INTRAVENOUS; SUBCUTANEOUS
Status: DISCONTINUED | OUTPATIENT
Start: 2025-03-18 | End: 2025-03-20

## 2025-03-18 RX ORDER — LAMOTRIGINE 100 MG/1
100 TABLET ORAL DAILY
Status: DISCONTINUED | OUTPATIENT
Start: 2025-03-18 | End: 2025-03-20

## 2025-03-18 RX ORDER — HEPARIN SODIUM 5000 [USP'U]/ML
5000 INJECTION, SOLUTION INTRAVENOUS; SUBCUTANEOUS EVERY 8 HOURS SCHEDULED
Status: DISCONTINUED | OUTPATIENT
Start: 2025-03-18 | End: 2025-03-20

## 2025-03-18 RX ORDER — POLYETHYLENE GLYCOL 3350 17 G/17G
17 POWDER, FOR SOLUTION ORAL DAILY PRN
Status: DISCONTINUED | OUTPATIENT
Start: 2025-03-18 | End: 2025-03-20

## 2025-03-18 RX ORDER — NICOTINE POLACRILEX 4 MG
15 LOZENGE BUCCAL
Status: DISCONTINUED | OUTPATIENT
Start: 2025-03-18 | End: 2025-03-18

## 2025-03-18 RX ORDER — ALBUTEROL SULFATE 90 UG/1
2 INHALANT RESPIRATORY (INHALATION) EVERY 6 HOURS PRN
Status: DISCONTINUED | OUTPATIENT
Start: 2025-03-18 | End: 2025-03-20

## 2025-03-18 RX ORDER — BENZONATATE 100 MG/1
200 CAPSULE ORAL 3 TIMES DAILY PRN
Status: DISCONTINUED | OUTPATIENT
Start: 2025-03-18 | End: 2025-03-20

## 2025-03-18 RX ORDER — POLYETHYLENE GLYCOL 3350 17 G/17G
17 POWDER, FOR SOLUTION ORAL 2 TIMES DAILY
Status: DISCONTINUED | OUTPATIENT
Start: 2025-03-18 | End: 2025-03-20

## 2025-03-18 RX ORDER — SENNOSIDES 8.6 MG
17.2 TABLET ORAL NIGHTLY PRN
Status: DISCONTINUED | OUTPATIENT
Start: 2025-03-18 | End: 2025-03-20

## 2025-03-18 RX ORDER — NIFEDIPINE 30 MG/1
30 TABLET, EXTENDED RELEASE ORAL DAILY
Status: DISCONTINUED | OUTPATIENT
Start: 2025-03-18 | End: 2025-03-20

## 2025-03-18 RX ORDER — MEPERIDINE HYDROCHLORIDE 25 MG/ML
12.5 INJECTION INTRAMUSCULAR; INTRAVENOUS; SUBCUTANEOUS AS NEEDED
Status: DISCONTINUED | OUTPATIENT
Start: 2025-03-18 | End: 2025-03-18

## 2025-03-18 RX ORDER — NALOXONE HYDROCHLORIDE 0.4 MG/ML
80 INJECTION, SOLUTION INTRAMUSCULAR; INTRAVENOUS; SUBCUTANEOUS AS NEEDED
Status: DISCONTINUED | OUTPATIENT
Start: 2025-03-18 | End: 2025-03-18

## 2025-03-18 RX ORDER — ARIPIPRAZOLE 5 MG/1
10 TABLET ORAL DAILY
Status: DISCONTINUED | OUTPATIENT
Start: 2025-03-18 | End: 2025-03-20

## 2025-03-18 RX ORDER — GABAPENTIN 100 MG/1
200 CAPSULE ORAL 3 TIMES DAILY
Status: DISCONTINUED | OUTPATIENT
Start: 2025-03-18 | End: 2025-03-20

## 2025-03-18 RX ORDER — METOPROLOL TARTRATE 1 MG/ML
2.5 INJECTION, SOLUTION INTRAVENOUS ONCE
Status: DISCONTINUED | OUTPATIENT
Start: 2025-03-18 | End: 2025-03-18

## 2025-03-18 RX ORDER — BISACODYL 10 MG
10 SUPPOSITORY, RECTAL RECTAL
Status: DISCONTINUED | OUTPATIENT
Start: 2025-03-18 | End: 2025-03-20

## 2025-03-18 RX ORDER — HYDROCODONE BITARTRATE AND ACETAMINOPHEN 5; 325 MG/1; MG/1
1 TABLET ORAL EVERY 4 HOURS PRN
Status: DISCONTINUED | OUTPATIENT
Start: 2025-03-18 | End: 2025-03-20

## 2025-03-18 RX ORDER — AZITHROMYCIN 250 MG/1
500 TABLET, FILM COATED ORAL
Status: DISCONTINUED | OUTPATIENT
Start: 2025-03-19 | End: 2025-03-20

## 2025-03-18 RX ORDER — HYDROMORPHONE HYDROCHLORIDE 1 MG/ML
0.2 INJECTION, SOLUTION INTRAMUSCULAR; INTRAVENOUS; SUBCUTANEOUS EVERY 5 MIN PRN
Status: DISCONTINUED | OUTPATIENT
Start: 2025-03-18 | End: 2025-03-18

## 2025-03-18 RX ORDER — HYDROCODONE BITARTRATE AND ACETAMINOPHEN 5; 325 MG/1; MG/1
2 TABLET ORAL EVERY 4 HOURS PRN
Status: DISCONTINUED | OUTPATIENT
Start: 2025-03-18 | End: 2025-03-20

## 2025-03-18 NOTE — CONSULTS
[facility] - Nephrology  Inpatient Consultation    Godwin Fonseca Patient Status:  Observation    1980 MRN [id number]   [facility] 3NE-A Attending Miguel Garsia, DO   Hosp Day # 0 PCP Adrian Small MD     Reason for consult: ESRD on HD  Date of consultation: 3/18/2025     HISTORY OF PRESENT ILLNESS:     Godwin Fonseca is a 44 year old male with a medical history notable for ESRD on HD MWF who presents with shortness of breath. Last dialysis session on Monday 3/17 per his normal schedule. No missed sessions recently. CXR on admission with concern for pneumonia. Admitted for further workup. Nephrology consulted for ESRD management with HD.    REVIEW OF SYSTEMS:     ROS as above, otherwise negative    HISTORY:     Past Medical History:    Anxiety state    Arrhythmia    Asthma (HCC)    Attention deficit hyperactivity disorder (ADHD)    Back problem    Bipolar 1 disorder (HCC)    CKD (chronic kidney disease) stage 3, GFR 30-59 ml/min (HCC)    Dr Meeks    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HCC)    Congestive heart disease (HCC)    COPD (chronic obstructive pulmonary disease) (HCC)    Coronary atherosclerosis    Deep vein thrombosis (HCC)    at age 17 R/T cast    Depression    Diabetes (HCC)    Dialysis patient    Diverticulosis of large intestine    Essential hypertension    3/21 echo: severe concentric LVH with normal EF and no MR or pHTN    Extrinsic asthma, unspecified    Heart attack (HCC)    - angiogram- no intervention    Heart valve disease    mitral valve repair in /    High blood pressure    High cholesterol    History of blood transfusion    History of mitral valve repair    Hyperlipidemia    Low back pain    tight and stiff after sweeping and mopping    LVH (left ventricular hypertrophy)    Migraines    Mixed hyperlipidemia     HDL 38 LDL 97 VLDL 57     Monoclonal gammopathy    IgG kappa     Muscle weakness    MVP (mitral valve prolapse)    Repair  at [place];  echoes as recently as 3/21 show mild or trivial MR and no stenosis    Neuropathy    Osteoarthritis    hip ,knees    Pneumonia due to organism    Pulmonary embolism (HCC)    Renal disorder    Stroke (HCC)    TIA (transient ischemic attack)    Initial history of left-sided weakness and slurred speech. (+) cocaine. MRI of the brain, CT angiogram of the head and neck, and 2D echo are all unremarkable.     TMJ (dislocation of temporomandibular joint)    Troponin level elevated    Trop 60 60 47 with TIA and no CP: Lexiscan negative with EF 51    Visual impairment     Past Surgical History:   Procedure Laterality Date    Av fistula revision, open Left     Cabg      Colonoscopy N/A 03/26/2023    Procedure: COLONOSCOPY;  Surgeon: Heath Vu MD;  Location:  ENDOSCOPY    Colonoscopy N/A 12/30/2023    Procedure: COLONOSCOPY with cold snare polypectomy and forcep polypectomy;  Surgeon: Ousmane Suarez MD;  Location:  ENDOSCOPY    Colonoscopy N/A 3/9/2025    Procedure: COLONOSCOPY WITH COLD SNARE POLYPECTOMY AND ENDO CLIPS X 2;  Surgeon: Bakari Noguera MD;  Location:  ENDOSCOPY    Colonoscopy & polypectomy  2019    Egd  2019    Duodenitis. Biopsied. EUS for weight loss was negative    Heart surgery      Hernia surgery  08/17/2022    Dr Barnes    Laminectomy,>2 sgmt,lumbar  09/06/2018    L4-L5 Decomp Discectomy ROEM L4-L5    Mitralplasty w cp bypass  1994    [place]: Repair    Repair rotator cuff,chronic Left     torn and had a ruptured bicep    Sinus surgery        Spine surgery procedure unlisted      Valve repair  1994    mitral valve     Family History   Problem Relation Age of Onset    Hypertension Father     Alcohol and Other Disorders Associated Father     Substance Abuse Father         cocaine    Dementia Father     Cancer Father         lung    Diabetes Mother     Cancer Mother         multiple myeloma    Hypertension Mother     Anxiety Maternal Aunt     Depression Maternal Aunt     Anxiety Maternal Aunt      Depression Maternal Aunt     Bipolar Disorder Maternal Aunt     Diabetes Maternal Grandmother     Hypertension Maternal Grandmother     Cancer Maternal Grandfather         stomach cancer    Diabetes Maternal Grandfather     Hypertension Maternal Grandfather     Alcohol and Other Disorders Associated Maternal Grandfather     Hypertension Paternal Grandmother     Hypertension Paternal Grandfather     Cancer Sister         uterine and ovarian    Hypertension Sister     Cancer Maternal Uncle         lung    Cancer Paternal Aunt         throat      reports that he quit smoking about 3 years ago. His smoking use included cigarettes. He started smoking about 30 years ago. He has a 27 pack-year smoking history. He has never been exposed to tobacco smoke. He has never used smokeless tobacco. He reports that he does not drink alcohol and does not use drugs.  Allergies[1]    MEDICATIONS:       Current Facility-Administered Medications:     albuterol (Ventolin HFA) 108 (90 Base) MCG/ACT inhaler 2 puff, 2 puff, Inhalation, Q6H PRN    ARIPiprazole (Abilify) tab 10 mg, 10 mg, Oral, Daily    ARIPiprazole (Abilify) tab 5 mg, 5 mg, Oral, Daily    atorvastatin (Lipitor) tab 20 mg, 20 mg, Oral, QPM    buPROPion ER (Wellbutrin XL) 24 hr tab 150 mg, 150 mg, Oral, Daily    calcium acetate (Phoslo) cap 667 mg, 667 mg, Oral, TID with meals    cloNIDine (Catapres) tab 0.1 mg, 0.1 mg, Oral, BID    ergocalciferol (Vitamin D2) cap 50,000 Units, 50,000 Units, Oral, Weekly    FLUoxetine (PROzac) cap 40 mg, 40 mg, Oral, Daily    gabapentin (Neurontin) cap 200 mg, 200 mg, Oral, TID    hydrALAZINE (Apresoline) tab 25 mg, 25 mg, Oral, TID    lamoTRIgine (LaMICtal) tab 100 mg, 100 mg, Oral, Daily    losartan (Cozaar) tab 100 mg, 100 mg, Oral, Daily    NIFEdipine ER (Procardia-XL) 24 hr tab 30 mg, 30 mg, Oral, Daily    sevelamer carbonate (Renvela) tab 2,400 mg, 2,400 mg, Oral, TID CC    tamsulosin (Flomax) cap 0.4 mg, 0.4 mg, Oral, Daily    [Held by  provider] lisdexamfetamine (Vyvanse) cap 60 mg, 60 mg, Oral, QAM    heparin (Porcine) 5000 UNIT/ML injection 5,000 Units, 5,000 Units, Subcutaneous, Q8H MARTHA    acetaminophen (Tylenol Extra Strength) tab 500 mg, 500 mg, Oral, Q4H PRN    acetaminophen (Tylenol) tab 650 mg, 650 mg, Oral, Q4H PRN **OR** HYDROcodone-acetaminophen (Norco) 5-325 MG per tab 1 tablet, 1 tablet, Oral, Q4H PRN **OR** HYDROcodone-acetaminophen (Norco) 5-325 MG per tab 2 tablet, 2 tablet, Oral, Q4H PRN    polyethylene glycol (PEG 3350) (Miralax) 17 g oral packet 17 g, 17 g, Oral, Daily PRN    sennosides (Senokot) tab 17.2 mg, 17.2 mg, Oral, Nightly PRN    bisacodyl (Dulcolax) 10 MG rectal suppository 10 mg, 10 mg, Rectal, Daily PRN    ondansetron (Zofran) 4 MG/2ML injection 4 mg, 4 mg, Intravenous, Q6H PRN    prochlorperazine (Compazine) 10 MG/2ML injection 5 mg, 5 mg, Intravenous, Q8H PRN    benzonatate (Tessalon) cap 200 mg, 200 mg, Oral, TID PRN    guaiFENesin (Robitussin) 100 MG/5 ML oral liquid 200 mg, 200 mg, Oral, Q4H PRN    [START ON 3/19/2025] cefTRIAXone (Rocephin) 2 g in sodium chloride 0.9% 100 mL IVPB-ADDV, 2 g, Intravenous, Q24H    [START ON 3/19/2025] azithromycin (Zithromax) tab 500 mg, 500 mg, Oral, Daily    acetaminophen (Tylenol Extra Strength) tab 1,000 mg, 1,000 mg, Oral, Once PRN **OR** HYDROcodone-acetaminophen (Norco) 5-325 MG per tab 1 tablet, 1 tablet, Oral, Once PRN **OR** HYDROcodone-acetaminophen (Norco) 5-325 MG per tab 2 tablet, 2 tablet, Oral, Once PRN    ondansetron (Zofran) 4 MG/2ML injection 4 mg, 4 mg, Intravenous, Q6H PRN    pantoprazole (Protonix) 40 mg in sodium chloride 0.9% PF 10 mL IV push, 40 mg, Intravenous, Q12H    Prescriptions Prior to Admission[2]     PHYSICAL EXAM:     Vital Signs: BP (!) 151/97 (BP Location: Right arm)   Pulse 94   Temp 97.9 °F (36.6 °C) (Oral)   Resp 20   Ht 6' 2\" (1.88 m)   Wt 257 lb (116.6 kg)   SpO2 91%   BMI 33.00 kg/m²   Temp (24hrs), Av.8 °F (36.6 °C), Min:97.7  °F (36.5 °C), Max:97.9 °F (36.6 °C)     No intake or output data in the 24 hours ending 03/18/25 1331  Wt Readings from Last 3 Encounters:   03/18/25 257 lb (116.6 kg)   03/16/25 241 lb 11.2 oz (109.6 kg)   03/10/25 251 lb 1.6 oz (113.9 kg)       General: no acute distress  HEENT: normocephalic, atraumatic  CV: RRR  Respiratory: no respiratory distress  Extremities: no edema bilaterally  Skin: warm, dry  Neuro: awake, alert    LABORATORY DATA:     Lab Results   Component Value Date    GLU 95 03/18/2025    BUN 67 (H) 03/18/2025    BUNCREA 13.0 03/07/2022    CREATSERUM 8.59 (H) 03/18/2025    ANIONGAP 11 03/18/2025    GFR 59 (L) 01/04/2018    GFRNAA 30 (L) 07/12/2022    GFRAA 35 (L) 07/12/2022    CA 8.5 (L) 03/18/2025    OSMOCALC 303 (H) 03/18/2025    ALKPHO 94 03/18/2025    AST 41 (H) 03/18/2025    ALT 66 (H) 03/18/2025    BILT 0.3 03/18/2025    TP 7.3 03/18/2025    ALB 4.0 03/18/2025    GLOBULIN 3.3 03/18/2025     03/18/2025    K 4.4 03/18/2025     03/18/2025    CO2 21.0 03/18/2025     Lab Results   Component Value Date    WBC 5.0 03/18/2025    RBC 2.53 (L) 03/18/2025    HGB 8.3 (L) 03/18/2025    HCT 25.2 (L) 03/18/2025    .0 03/18/2025    MPV 11.5 12/14/2012    MCV 99.6 03/18/2025    MCH 32.8 03/18/2025    MCHC 32.9 03/18/2025    RDW 15.0 03/18/2025    NEPRELIM 2.71 03/18/2025    NEPERCENT 54.8 03/18/2025    LYPERCENT 22.2 03/18/2025    MOPERCENT 14.7 03/18/2025    EOPERCENT 6.7 03/18/2025    BAPERCENT 1.4 03/18/2025    NE 2.71 03/18/2025    LYMABS 1.10 03/18/2025    MOABSO 0.73 03/18/2025    EOABSO 0.33 03/18/2025    BAABSO 0.07 03/18/2025     Lab Results   Component Value Date    MALBP 167.00 05/24/2023    CREUR 142.00 05/24/2023    CREUR 142.00 05/24/2023     Lab Results   Component Value Date    COLORUR Light-Yellow 11/17/2024    CLARITY Clear 11/17/2024    SPECGRAVITY 1.013 11/17/2024    GLUUR Normal 11/17/2024    BILUR Negative 11/17/2024    KETUR Negative 11/17/2024    BLOODURINE Negative  11/17/2024    PHURINE 8.5 (H) 11/17/2024    PROUR 100 (A) 11/17/2024    UROBILINOGEN Normal 11/17/2024    NITRITE Negative 11/17/2024    LEUUR Negative 11/17/2024    WBCUR 1-5 11/17/2024    RBCUR 0-2 11/17/2024    EPIUR Few (A) 11/17/2024    BACUR Rare (A) 11/17/2024    CAOXUR Occasional (A) 04/20/2018    HYLUR Present (A) 05/14/2019       IMAGING:     Reviewed    ASSESSMENT:      # ESRD on HD  -MWF schedule outpatient  -[facility] - planning to transfer to [facility]     # HTN/Volume Status  -Home medications: carvedilol, hydralazine, losartan, nifedipine, clonidine     # Anemia     # Secondary Hyperparathyroidism     PLAN:      -HD per MWF schedule  -UF with HD as tolerated  -Continue home BP medications   -Defer OWEN in setting of high BPs  -Anemia management per primary - hemoglobin lower than baseline.  -Continue sevelamer   -Avoid nephrotoxins and renally dose medications for creatinine clearance  -Monitor intake and output daily  -Daily weights        We will continue to follow.    Thank you for allowing us to participate in the care of this patient.         Samantha Muñoz, DO  [facility] - Nephrology               [1]   Allergies  Allergen Reactions    Hydrochlorothiazide RASH and HIVES   [2]   Medications Prior to Admission   Medication Sig Dispense Refill Last Dose/Taking    polyethylene glycol, PEG 3350, 17 g Oral Powd Pack Take 17 g by mouth daily as needed.   3/17/2025 Morning    Phenylephrine HCl (PREPARATION H) 0.25 % Rectal Suppos Place 1 suppository rectally 4 (four) times daily as needed (Hemorrhoids after bowel movements). 24 suppository 0 3/17/2025    atorvastatin 20 MG Oral Tab Take 1 tablet (20 mg total) by mouth every evening.   3/17/2025 Morning    HYDROcodone-acetaminophen  MG Oral Tab Take 1 tablet by mouth every 6 (six) hours as needed for Pain.   Past Week    hydrALAZINE 25 MG Oral Tab Take 1 tablet (25 mg total) by mouth 3 (three) times daily.   3/17/2025  Evening    cloNIDine 0.1 MG Oral Tab Take 1 tablet (0.1 mg total) by mouth 2 (two) times daily. 60 tablet 0 3/17/2025 Evening    NIFEdipine ER 30 MG Oral Tablet 24 Hr Take 1 tablet (30 mg total) by mouth daily. 30 tablet 0 3/17/2025 Morning    sevelamer carbonate 800 MG Oral Tab Take 3 tablets (2,400 mg total) by mouth 3 (three) times daily with meals. 270 tablet 0 3/17/2025    calcium acetate 667 MG Oral Cap Take 1 capsule (667 mg total) by mouth with breakfast, with lunch, and with evening meal. 90 capsule 0 3/17/2025    ARIPiprazole 5 MG Oral Tab Take 1 tablet (5 mg total) by mouth daily. Take 5mg (1 tablet) by mouth daily. Take each dose with 1 tablet of 10mg aripiprazole for a total daily dose of 15mg aripiprazole.   3/17/2025 Morning    ARIPiprazole 10 MG Oral Tab Take 1 tablet (10 mg total) by mouth daily. Take 10mg (1 tablet) by mouth daily. Take each dose with 1 tablet of 5mg aripiprazole for a total daily dose of 15mg aripiprazole.   3/17/2025 Morning    ergocalciferol 1.25 MG (32904 UT) Oral Cap Take 1 capsule (50,000 Units total) by mouth once a week.   Past Week    losartan 100 MG Oral Tab Take 1 tablet (100 mg total) by mouth daily.   3/17/2025 Morning    ondansetron 4 MG Oral Tablet Dispersible Take 1 tablet (4 mg total) by mouth every 8 (eight) hours as needed for Nausea.   Past Month    gabapentin 100 MG Oral Cap Take 2 capsules (200 mg total) by mouth 3 (three) times daily. 180 capsule 0 3/17/2025 Evening    VYVANSE 60 MG Oral Cap Take 1 capsule (60 mg total) by mouth every morning.   3/17/2025 Morning    lamoTRIgine 100 MG Oral Tab Take 1 tablet (100 mg total) by mouth daily.   3/17/2025 Morning    albuterol 108 (90 Base) MCG/ACT Inhalation Aero Soln Inhale 2 puffs into the lungs every 6 (six) hours as needed for Wheezing.   3/17/2025    tamsulosin 0.4 MG Oral Cap Take 1 capsule (0.4 mg total) by mouth daily.   3/17/2025 Morning    buPROPion  MG Oral Tablet 24 Hr Take 1 tablet (150 mg total)  by mouth daily.   3/17/2025 Morning    FLUoxetine HCl 40 MG Oral Cap Take 1 capsule (40 mg total) by mouth daily. 7 capsule 0 3/17/2025 Morning    carvedilol 25 MG Oral Tab Take 1 tablet (25 mg total) by mouth in the morning and 1 tablet (25 mg total) in the evening. Take with meals. 60 tablet 6 3/17/2025 Evening    Fluticasone Propionate 50 MCG/ACT Nasal Suspension SPRAY ONCE INTO EACH NOSTRIL BID PRN 15.8 mL 0 3/17/2025

## 2025-03-18 NOTE — H&P
[facility] Hospitalist History and Physical      Chief Complaint   Patient presents with    Difficulty Breathing        PCP: Adrian Small MD    History of Present Illness: Patient is a 44 year old male with ESRD on MWF HD, hemorrhoids, diverticulosis, hypertension and chronic anemia presented for dyspnea that began yesterday evening.  Patient with recent admission for rectal bleeding s/p colonoscopy found to be hemorrhoid related etiology and was discharged after dialysis session.  Patient reports he has been compliant with dialysis.  No fevers or chills. Notes on going rectal bleeding, reports most of the stools have been dark in nature.     In the ER, hypertensive, saturating well on room air.  Labs with initial troponin 237 and subsequent to 45.  proBNP elevated at 6357.  Lactate WNL.  Hemoglobin 8.3 (stable from his recent discharge) and BMP consistent with ESRD.  CXR with right lower lobe opacity versus evolving effusion.  Patient admitted for further monitoring.    Medical History:  Past Medical History:    Anxiety state    Arrhythmia    Asthma (HCC)    Attention deficit hyperactivity disorder (ADHD)    Back problem    Bipolar 1 disorder (HCC)    CKD (chronic kidney disease) stage 3, GFR 30-59 ml/min ([facility])    Dr Meeks    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HCC)    Congestive heart disease (HCC)    COPD (chronic obstructive pulmonary disease) ([facility])    Coronary atherosclerosis    Deep vein thrombosis ([facility])    at age 17 R/T cast    Depression    Diabetes (HCC)    Dialysis patient    Diverticulosis of large intestine    Essential hypertension    3/21 echo: severe concentric LVH with normal EF and no MR or pHTN    Extrinsic asthma, unspecified    Heart attack (HCC)    2016- angiogram- no intervention    Heart valve disease    mitral valve repair in 1994/    High blood pressure    High cholesterol    History of blood transfusion    History of mitral valve repair    Hyperlipidemia    Low back pain    tight  and stiff after sweeping and mopping    LVH (left ventricular hypertrophy)    Migraines    Mixed hyperlipidemia     HDL 38 LDL 97 VLDL 57     Monoclonal gammopathy    IgG kappa     Muscle weakness    MVP (mitral valve prolapse)    Repair 1994 at [place]; echoes as recently as 3/21 show mild or trivial MR and no stenosis    Neuropathy    Osteoarthritis    hip ,knees    Pneumonia due to organism    Pulmonary embolism (HCC)    Renal disorder    Stroke (HCC)    TIA (transient ischemic attack)    Initial history of left-sided weakness and slurred speech. (+) cocaine. MRI of the brain, CT angiogram of the head and neck, and 2D echo are all unremarkable.     TMJ (dislocation of temporomandibular joint)    Troponin level elevated    Trop 60 60 47 with TIA and no CP: Lexiscan negative with EF 51    Visual impairment      Past Surgical History:   Procedure Laterality Date    Av fistula revision, open Left     Cabg      Colonoscopy N/A 03/26/2023    Procedure: COLONOSCOPY;  Surgeon: Heath Vu MD;  Location:  ENDOSCOPY    Colonoscopy N/A 12/30/2023    Procedure: COLONOSCOPY with cold snare polypectomy and forcep polypectomy;  Surgeon: Ousmane Suarez MD;  Location:  ENDOSCOPY    Colonoscopy N/A 3/9/2025    Procedure: COLONOSCOPY WITH COLD SNARE POLYPECTOMY AND ENDO CLIPS X 2;  Surgeon: Bakari Noguera MD;  Location:  ENDOSCOPY    Colonoscopy & polypectomy  2019    Egd  2019    Duodenitis. Biopsied. EUS for weight loss was negative    Heart surgery      Hernia surgery  08/17/2022    Dr Barnes    Laminectomy,>2 sgmt,lumbar  09/06/2018    L4-L5 Decomp Discectomy ROEM L4-L5    Mitralplasty w cp bypass  1994    [place]: Repair    Repair rotator cuff,chronic Left     torn and had a ruptured bicep    Sinus surgery        Spine surgery procedure unlisted      Valve repair  1994    mitral valve      Social History     Tobacco Use    Smoking status: Former     Current packs/day: 0.00     Average packs/day: 1  pack/day for 27.0 years (27.0 ttl pk-yrs)     Types: Cigarettes     Start date: 2/28/1995     Quit date: 2/28/2022     Years since quitting: 3.0     Passive exposure: Never    Smokeless tobacco: Never   Substance Use Topics    Alcohol use: No      Family History   Problem Relation Age of Onset    Hypertension Father     Alcohol and Other Disorders Associated Father     Substance Abuse Father         cocaine    Dementia Father     Cancer Father         lung    Diabetes Mother     Cancer Mother         multiple myeloma    Hypertension Mother     Anxiety Maternal Aunt     Depression Maternal Aunt     Anxiety Maternal Aunt     Depression Maternal Aunt     Bipolar Disorder Maternal Aunt     Diabetes Maternal Grandmother     Hypertension Maternal Grandmother     Cancer Maternal Grandfather         stomach cancer    Diabetes Maternal Grandfather     Hypertension Maternal Grandfather     Alcohol and Other Disorders Associated Maternal Grandfather     Hypertension Paternal Grandmother     Hypertension Paternal Grandfather     Cancer Sister         uterine and ovarian    Hypertension Sister     Cancer Maternal Uncle         lung    Cancer Paternal Aunt         throat       Allergies[1]     Review of Systems  Comprehensive ROS reviewed and negative except for what is stated in HPI.      OBJECTIVE:  BP (!) 165/101 (BP Location: Right arm)   Pulse 100   Temp 97.8 °F (36.6 °C) (Oral)   Resp 20   Ht 6' 2\" (1.88 m)   Wt 257 lb (116.6 kg)   SpO2 90%   BMI 33.00 kg/m²   General: No apparent distress.  Alert and oriented.  HEENT:  EOMI, PERRLA.  Pulm: Diminished breath sounds.  Normal respiratory effort.  CV: Regular rate and rhythm, no murmur.   Abd: Soft, nontender, nondistended.   MSK: Full range of motion in extremities, no obvious deformities.    Skin: No lesions or rashes.  Neuro: No obvious focal deficits.    Data Review:    LABS:   Lab Results   Component Value Date    WBC 5.0 03/18/2025    HGB 8.3 03/18/2025    HCT  25.2 03/18/2025    .0 03/18/2025    CREATSERUM 8.59 03/18/2025    BUN 67 03/18/2025     03/18/2025    K 4.4 03/18/2025     03/18/2025    CO2 21.0 03/18/2025    GLU 95 03/18/2025    CA 8.5 03/18/2025    ALB 4.0 03/18/2025    ALKPHO 94 03/18/2025    BILT 0.3 03/18/2025    TP 7.3 03/18/2025    AST 41 03/18/2025    ALT 66 03/18/2025       All lab work and imaging personally reviewed.    Assessment/Plan:     Assessment/ Plan:     #Dyspnea  CXR w/ RLL opacity vs effusion  Suspect worsening related to fluid status   -will empirically cover with rocephin/ azithromycin. Procal pending.   -deescalate as possible  -remains on room air  -follow urinary antigens  -nebs PRN    #Rectal bleeding  #Hemorrhoids w/ history of lower GIB   #Chronic anemia  -hemoglobin 8 on presentation- baseline  -reports multiple episodes of dark/ tarry stools  -IV PPI, will consult GI , NPO and advance as tolerated    #Elevated troponin likely 2/2 type II demand  -no chest pain  -cardiology on consult     #ESRD  -nephro on consult for MWF HD management     #HTN  -resume home antihypertensives     #Bipolar  #HFpEF  -resume home meds accordingly     DVT ppx: heparin subcu   Diet: renal   Disposition: depending clinical status     Outpatient records or previous hospital records reviewed.   [facility] hospitalist to continue to follow patient while in house.    DO Kevin Peck [facility] Hospitalist         [1]   Allergies  Allergen Reactions    Hydrochlorothiazide RASH and HIVES

## 2025-03-18 NOTE — PLAN OF CARE
Pt ad with ABD , elevated trop. Renal, cards & GI consulted. Pt states he has upper left quadrant pain. GI signed off. Renal is managing dialysis orders. Pt alert x 4 able to make needs known. Pt resting comfortably in bed. Agreeable to plan of care.     Problem: PAIN - ADULT  Goal: Verbalizes/displays adequate comfort level or patient's stated pain goal  Description: INTERVENTIONS:- Encourage pt to monitor pain and request assistance- Assess pain using appropriate pain scale- Administer analgesics based on type and severity of pain and evaluate response- Implement non-pharmacological measures as appropriate and evaluate response- Consider cultural and social influences on pain and pain management- Manage/alleviate anxiety- Utilize distraction and/or relaxation techniques- Monitor for opioid side effects- Notify MD/LIP if interventions unsuccessful or patient reports new pain- Anticipate increased pain with activity and pre-medicate as appropriate  Outcome: Progressing

## 2025-03-18 NOTE — ED QUICK NOTES
Orders for admission, patient is aware of plan and ready to go upstairs. Any questions, please call ED RN Agus at extension [phone].     Patient Covid vaccination status: Fully vaccinated     COVID Test Ordered in ED: Rapid SARS-CoV-2 by PCR    COVID Suspicion at Admission: N/A    Running Infusions:  Zithromax @250ml/hr    Mental Status/LOC at time of transport: A+[id number]    Other pertinent information:   CIWA score: N/A   NIH score:  N/A

## 2025-03-18 NOTE — ED QUICK NOTES
Unable to obtain sufficient blood volume for blood cultures and lactic acid, Dr Feldman made aware

## 2025-03-18 NOTE — H&P
GASTROENTEROLOGY CONSULTATION  Ousmane Suarez MD    Department of Gastroenterology  [facility]    Godwin Fonseca Patient Status:  Observation    1980 MRN [id number]   Location [facility] 3NE-A Attending Miguel Garsia, DO   Hosp Day # 0 PCP Adrian Small MD     Reason for Consultation:  Rectal bleeding  Enlarged internal hemorrhoids  personal history of colon polyps     History of Present Illness:  Godwin Fonseca is a a(n) 44 year old male with h of CAD, CHF, DM, COPD, ESRD on HD, hx of PD and peritonitis, admitted with dyspnea.  Consult requested for evaluation of rectal bleeding.  Long standing hx of constipation and straining to have a bm.  Pt noticing blood dripping or squirting into the bowel with bms.  Baseline hgb 7.8 - 11, with 8.3 today.  On last admission for HTN urgency and rectal bleeding, colonoscopy on 3/9 done and failed to show an active source of GI bleeding.   VCE 10/2024 - normal.  Flex sig 3/2024 for rectal bleeding showed enlarged internal hemorrhoids. Colonoscopy 2023 - diverticulosis, enlarged internal hemorrhoids, and a small  adenomatous polyp. Colonoscopy 3/9/2025 - diverticulosis and several small adenomatous polyps removed.      History:  Past Medical History:    Anxiety state    Arrhythmia    Asthma (HCC)    Attention deficit hyperactivity disorder (ADHD)    Back problem    Bipolar 1 disorder (HCC)    CKD (chronic kidney disease) stage 3, GFR 30-59 ml/min (HCC)    Dr Meeks    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HCC)    Congestive heart disease (HCC)    COPD (chronic obstructive pulmonary disease) (HCC)    Coronary atherosclerosis    Deep vein thrombosis (HCC)    at age 17 R/T cast    Depression    Diabetes (HCC)    Dialysis patient    Diverticulosis of large intestine    Essential hypertension    3/21 echo: severe concentric LVH with normal EF and no MR or pHTN    Extrinsic asthma, unspecified    Heart attack (HCC)    - angiogram- no intervention    Heart valve  disease    mitral valve repair in 1994/    High blood pressure    High cholesterol    History of blood transfusion    History of mitral valve repair    Hyperlipidemia    Low back pain    tight and stiff after sweeping and mopping    LVH (left ventricular hypertrophy)    Migraines    Mixed hyperlipidemia     HDL 38 LDL 97 VLDL 57     Monoclonal gammopathy    IgG kappa     Muscle weakness    MVP (mitral valve prolapse)    Repair 1994 at [place]; echoes as recently as 3/21 show mild or trivial MR and no stenosis    Neuropathy    Osteoarthritis    hip ,knees    Pneumonia due to organism    Pulmonary embolism (HCC)    Renal disorder    Stroke (HCC)    TIA (transient ischemic attack)    Initial history of left-sided weakness and slurred speech. (+) cocaine. MRI of the brain, CT angiogram of the head and neck, and 2D echo are all unremarkable.     TMJ (dislocation of temporomandibular joint)    Troponin level elevated    Trop 60 60 47 with TIA and no CP: Lexiscan negative with EF 51    Visual impairment     Past Surgical History:   Procedure Laterality Date    Av fistula revision, open Left     Cabg      Colonoscopy N/A 03/26/2023    Procedure: COLONOSCOPY;  Surgeon: Heath Vu MD;  Location:  ENDOSCOPY    Colonoscopy N/A 12/30/2023    Procedure: COLONOSCOPY with cold snare polypectomy and forcep polypectomy;  Surgeon: Ousmane Suarez MD;  Location:  ENDOSCOPY    Colonoscopy N/A 3/9/2025    Procedure: COLONOSCOPY WITH COLD SNARE POLYPECTOMY AND ENDO CLIPS X 2;  Surgeon: Bakari Noguera MD;  Location:  ENDOSCOPY    Colonoscopy & polypectomy  2019    Egd  2019    Duodenitis. Biopsied. EUS for weight loss was negative    Heart surgery      Hernia surgery  08/17/2022    Dr Barnes    Laminectomy,>2 sgmt,lumbar  09/06/2018    L4-L5 Decomp Discectomy ROEM L4-L5    Mitralplasty w cp bypass  1994    [place]: Repair    Repair rotator cuff,chronic Left     torn and had a ruptured bicep    Sinus surgery         Spine surgery procedure unlisted      Valve repair  1994    mitral valve     Family History   Problem Relation Age of Onset    Hypertension Father     Alcohol and Other Disorders Associated Father     Substance Abuse Father         cocaine    Dementia Father     Cancer Father         lung    Diabetes Mother     Cancer Mother         multiple myeloma    Hypertension Mother     Anxiety Maternal Aunt     Depression Maternal Aunt     Anxiety Maternal Aunt     Depression Maternal Aunt     Bipolar Disorder Maternal Aunt     Diabetes Maternal Grandmother     Hypertension Maternal Grandmother     Cancer Maternal Grandfather         stomach cancer    Diabetes Maternal Grandfather     Hypertension Maternal Grandfather     Alcohol and Other Disorders Associated Maternal Grandfather     Hypertension Paternal Grandmother     Hypertension Paternal Grandfather     Cancer Sister         uterine and ovarian    Hypertension Sister     Cancer Maternal Uncle         lung    Cancer Paternal Aunt         throat      reports that he quit smoking about 3 years ago. His smoking use included cigarettes. He started smoking about 30 years ago. He has a 27 pack-year smoking history. He has never been exposed to tobacco smoke. He has never used smokeless tobacco. He reports that he does not drink alcohol and does not use drugs.    Allergies:  Allergies[1]    Medications:    Current Facility-Administered Medications:     albuterol (Ventolin HFA) 108 (90 Base) MCG/ACT inhaler 2 puff, 2 puff, Inhalation, Q6H PRN    ARIPiprazole (Abilify) tab 10 mg, 10 mg, Oral, Daily    ARIPiprazole (Abilify) tab 5 mg, 5 mg, Oral, Daily    atorvastatin (Lipitor) tab 20 mg, 20 mg, Oral, QPM    buPROPion ER (Wellbutrin XL) 24 hr tab 150 mg, 150 mg, Oral, Daily    calcium acetate (Phoslo) cap 667 mg, 667 mg, Oral, TID with meals    cloNIDine (Catapres) tab 0.1 mg, 0.1 mg, Oral, BID    ergocalciferol (Vitamin D2) cap 50,000 Units, 50,000 Units, Oral, Weekly     FLUoxetine (PROzac) cap 40 mg, 40 mg, Oral, Daily    gabapentin (Neurontin) cap 200 mg, 200 mg, Oral, TID    hydrALAZINE (Apresoline) tab 25 mg, 25 mg, Oral, TID    lamoTRIgine (LaMICtal) tab 100 mg, 100 mg, Oral, Daily    losartan (Cozaar) tab 100 mg, 100 mg, Oral, Daily    NIFEdipine ER (Procardia-XL) 24 hr tab 30 mg, 30 mg, Oral, Daily    sevelamer carbonate (Renvela) tab 2,400 mg, 2,400 mg, Oral, TID CC    tamsulosin (Flomax) cap 0.4 mg, 0.4 mg, Oral, Daily    [Held by provider] lisdexamfetamine (Vyvanse) cap 60 mg, 60 mg, Oral, QAM    heparin (Porcine) 5000 UNIT/ML injection 5,000 Units, 5,000 Units, Subcutaneous, Q8H MARTHA    acetaminophen (Tylenol Extra Strength) tab 500 mg, 500 mg, Oral, Q4H PRN    acetaminophen (Tylenol) tab 650 mg, 650 mg, Oral, Q4H PRN **OR** HYDROcodone-acetaminophen (Norco) 5-325 MG per tab 1 tablet, 1 tablet, Oral, Q4H PRN **OR** HYDROcodone-acetaminophen (Norco) 5-325 MG per tab 2 tablet, 2 tablet, Oral, Q4H PRN    polyethylene glycol (PEG 3350) (Miralax) 17 g oral packet 17 g, 17 g, Oral, Daily PRN    sennosides (Senokot) tab 17.2 mg, 17.2 mg, Oral, Nightly PRN    bisacodyl (Dulcolax) 10 MG rectal suppository 10 mg, 10 mg, Rectal, Daily PRN    ondansetron (Zofran) 4 MG/2ML injection 4 mg, 4 mg, Intravenous, Q6H PRN    prochlorperazine (Compazine) 10 MG/2ML injection 5 mg, 5 mg, Intravenous, Q8H PRN    benzonatate (Tessalon) cap 200 mg, 200 mg, Oral, TID PRN    guaiFENesin (Robitussin) 100 MG/5 ML oral liquid 200 mg, 200 mg, Oral, Q4H PRN    [START ON 3/19/2025] cefTRIAXone (Rocephin) 2 g in sodium chloride 0.9% 100 mL IVPB-ADDV, 2 g, Intravenous, Q24H    [START ON 3/19/2025] azithromycin (Zithromax) tab 500 mg, 500 mg, Oral, Daily    acetaminophen (Tylenol Extra Strength) tab 1,000 mg, 1,000 mg, Oral, Once PRN **OR** HYDROcodone-acetaminophen (Norco) 5-325 MG per tab 1 tablet, 1 tablet, Oral, Once PRN **OR** HYDROcodone-acetaminophen (Norco) 5-325 MG per tab 2 tablet, 2 tablet, Oral,  Once PRN    ondansetron (Zofran) 4 MG/2ML injection 4 mg, 4 mg, Intravenous, Q6H PRN    pantoprazole (Protonix) 40 mg in sodium chloride 0.9% PF 10 mL IV push, 40 mg, Intravenous, Q12H    ipratropium-albuterol (Duoneb) 0.5-2.5 (3) MG/3ML inhalation solution 3 mL, 3 mL, Nebulization, Q4H PRN    sodium chloride 0.9 % IV bolus 100 mL, 100 mL, Intravenous, Q30 Min PRN **AND** albumin human (Albumin) 25% injection 25 g, 25 g, Intravenous, PRN Dialysis    heparin (Porcine) 1000 UNIT/ML injection 1,500 Units, 1.5 mL, Intracatheter, PRN Dialysis    Review of Systems:  Gastrointestinal: See above  General: Denies fatigue, chills/fever, night sweats, weight loss, loss of appetite, weight gain, sleep disturbance.  Cardiovascular: Denies history of heart murmur, chest pain or angina.  Respiratory: Denies shortness of breath, chronic/frequent hoarseness, wheezing, chronic cough, cough up sputum.  Genitourinary: Denies kidney stones, painful/difficult urination, frequent urinary infections, frequent urination, blood in urine, incontinence, kidney failure.  Psychosocial: noncontributory  Neuro: no tremor, dysarthria, gait disturbance  Skin: Denies severe itching, unusual moles, rash, flushing, change in hair or nails.  Bone/joint: No joint pain or inflammation  Heme/Lymphatic: Denies easy bruising, anemia, excessive bleeding, enlarging or painful lymph nodes.  Allergy: Denies medication allergy, latex/rubber allergy, anaphylactic or other reaction to anesthesia, food allergy.   Eyes: Denies blurred/double vision, eye disease, glasses or contacts, glaucoma.  ENT: Denies nose or gums bleeding, mouth sores, bad breath or bad taste in mouth, hearing loss.  Physical Exam:    Blood pressure (!) 151/97, pulse 94, temperature 97.9 °F (36.6 °C), temperature source Oral, resp. rate 20, height 6' 2\" (1.88 m), weight 257 lb (116.6 kg), SpO2 91%.    General: Appears alert, oriented x3 and in no acute distress.  HEENT: Normal. No neck vein  distention. Thyroid not enlarged.  No lymphadenopathy.  CV: S1 and S2 normal.  No murmurs or gallops.  Lungs: Clear to auscultation.  Abdomen: Soft and nondistended.  Nontender.  No masses.  Bowel sounds are present.  Back: No CVA tenderness.  Extremities: No edema, cyanosis, or clubbing.  Skin: Warm and dry.  Rectal: grade 3/4 internal hemorrhoids with prolapse  Laboratory Data:  Lab Results   Component Value Date    WBC 5.0 03/18/2025    HGB 8.3 03/18/2025    HCT 25.2 03/18/2025    .0 03/18/2025    CREATSERUM 8.59 03/18/2025    BUN 67 03/18/2025     03/18/2025    K 4.4 03/18/2025     03/18/2025    CO2 21.0 03/18/2025    GLU 95 03/18/2025    CA 8.5 03/18/2025    ALB 4.0 03/18/2025    ALKPHO 94 03/18/2025    BILT 0.3 03/18/2025    TP 7.3 03/18/2025    AST 41 03/18/2025    ALT 66 03/18/2025    PGLU 165 03/18/2025         Assessment:     Rectal bleeding   Enlarged internal hemorrhoids  personal history of colon polyps    Last colonoscopy last week - done for rectal bleeding, showed diverticulosis and small adenomatous polyps.   Hx of enlarged hemorrhoids.  Pt has outpt appt to see surgery regarding hemorrhoidal management.  He is a surgical candidate for hemorrhoidectomy given frequency and volume of bleeding and that the hemorrhoids are prolapsing externally.  Plan:  1. Daily stool softeners.  2. Surgical evaluation - inpatient or outpt.  3. No role for further endoscopy.  4. Will sign off.    Cc: Dr. Garsia      Thank you for allowing to participate in the care of this patient.    Ousmane Suarez MD  3/18/2025  2:20 PM        [1]   Allergies  Allergen Reactions    Hydrochlorothiazide RASH and HIVES

## 2025-03-18 NOTE — PLAN OF CARE
Problem: PAIN - ADULT  Goal: Verbalizes/displays adequate comfort level or patient's stated pain goal  Description: INTERVENTIONS:- Encourage pt to monitor pain and request assistance- Assess pain using appropriate pain scale- Administer analgesics based on type and severity of pain and evaluate response- Implement non-pharmacological measures as appropriate and evaluate response- Consider cultural and social influences on pain and pain management- Manage/alleviate anxiety- Utilize distraction and/or relaxation techniques- Monitor for opioid side effects- Notify MD/LIP if interventions unsuccessful or patient reports new pain- Anticipate increased pain with activity and pre-medicate as appropriate  Outcome: Progressing     Problem: SAFETY ADULT - FALL  Goal: Free from fall injury  Description: INTERVENTIONS:- Assess pt frequently for physical needs- Identify cognitive and physical deficits and behaviors that affect risk of falls.- [place] fall precautions as indicated by assessment.- Educate pt/family on patient safety including physical limitations- Instruct pt to call for assistance with activity based on assessment- Modify environment to reduce risk of injury- Provide assistive devices as appropriate- Consider OT/PT consult to assist with strengthening/mobility- Encourage toileting schedule  Outcome: Progressing

## 2025-03-18 NOTE — CM/SW NOTE
SW received order fro home health services. Chart reviewed and pt has hx with [facility]. Per chart review, pt recently transferred HD clinics to [facility].     SW sent referral to [facility] in [place]. SW/CM to remain available for support and/or discharge planning.    JOSH Hammond  Discharge Planner

## 2025-03-18 NOTE — ED INITIAL ASSESSMENT (HPI)
PT to the ED for evaluation of SOB that began overnight. PT is a MWF dialysis PT. PT also reports GI bleed. PT states he was just admitted to the hospital Saturday for the same.

## 2025-03-18 NOTE — PROGRESS NOTES
NURSING ADMISSION NOTE      Patient admitted via Ambulance  Oriented to room.  Safety precautions initiated.  Bed in low position.  Call light in reach.    Admission navigator completed with patient by this RN. A/Ox4, on room air, NSR on tele. Ambulates, voids. ABX continued from ED. Complaints of pain to abdomen at this time. PRN norco given per mar. Regular diet. IVPB zithromax Q24 per mar. Patient updated with plan of care. All needs met at this time.

## 2025-03-18 NOTE — ED PROVIDER NOTES
Patient Seen in: [facility] In [place]      History     Chief Complaint   Patient presents with    Difficulty Breathing     Stated Complaint: SOB that started this evening (had dialysis today) also reports GI bleeding    Subjective:   Patient well-known to the emergency room with frequent ER visits for fluid overload and    The history is provided by the patient.             Objective:     Past Medical History:    Anxiety state    Arrhythmia    Asthma ([facility])    Attention deficit hyperactivity disorder (ADHD)    Back problem    Bipolar 1 disorder ([facility])    CKD (chronic kidney disease) stage 3, GFR 30-59 ml/min ([facility])    Dr Meeks    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[facility])    Congestive heart disease ([facility])    COPD (chronic obstructive pulmonary disease) ([facility])    Coronary atherosclerosis    Deep vein thrombosis ([facility])    at age 17 R/T cast    Depression    Diabetes ([facility])    Dialysis patient    Diverticulosis of large intestine    Essential hypertension    3/21 echo: severe concentric LVH with normal EF and no MR or pHTN    Extrinsic asthma, unspecified    Heart attack ([facility])    2016- angiogram- no intervention    Heart valve disease    mitral valve repair in 1994/    High blood pressure    High cholesterol    History of blood transfusion    History of mitral valve repair    Hyperlipidemia    Low back pain    tight and stiff after sweeping and mopping    LVH (left ventricular hypertrophy)    Migraines    Mixed hyperlipidemia     HDL 38 LDL 97 VLDL 57     Monoclonal gammopathy    IgG kappa     Muscle weakness    MVP (mitral valve prolapse)    Repair 1994 at [place]; echoes as recently as 3/21 show mild or trivial MR and no stenosis    Neuropathy    Osteoarthritis    hip ,knees    Pneumonia due to organism    Pulmonary embolism ([facility])    Renal disorder    Stroke ([facility])    TIA (transient ischemic attack)    Initial history of left-sided weakness and slurred speech. (+) cocaine. MRI of the brain, CT  angiogram of the head and neck, and 2D echo are all unremarkable.     TMJ (dislocation of temporomandibular joint)    Troponin level elevated    Trop 60 60 47 with TIA and no CP: Lexiscan negative with EF 51    Visual impairment              Past Surgical History:   Procedure Laterality Date    Av fistula revision, open Left     Cabg      Colonoscopy N/A 03/26/2023    Procedure: COLONOSCOPY;  Surgeon: Heath Vu MD;  Location:  ENDOSCOPY    Colonoscopy N/A 12/30/2023    Procedure: COLONOSCOPY with cold snare polypectomy and forcep polypectomy;  Surgeon: Ousmane Suarez MD;  Location:  ENDOSCOPY    Colonoscopy N/A 3/9/2025    Procedure: COLONOSCOPY WITH COLD SNARE POLYPECTOMY AND ENDO CLIPS X 2;  Surgeon: Bakari Noguera MD;  Location:  ENDOSCOPY    Colonoscopy & polypectomy  2019    Egd  2019    Duodenitis. Biopsied. EUS for weight loss was negative    Heart surgery      Hernia surgery  08/17/2022    Dr Barnes    Laminectomy,>2 sgmt,lumbar  09/06/2018    L4-L5 Decomp Discectomy ROEM L4-L5    Mitralplasty w cp bypass  1994    [place]: Repair    Repair rotator cuff,chronic Left     torn and had a ruptured bicep    Sinus surgery        Spine surgery procedure unlisted      Valve repair  1994    mitral valve                Social History     Socioeconomic History    Marital status:    Tobacco Use    Smoking status: Former     Current packs/day: 0.00     Average packs/day: 1 pack/day for 27.0 years (27.0 ttl pk-yrs)     Types: Cigarettes     Start date: 2/28/1995     Quit date: 2/28/2022     Years since quitting: 3.0     Passive exposure: Never    Smokeless tobacco: Never   Vaping Use    Vaping status: Never Used   Substance and Sexual Activity    Alcohol use: No    Drug use: No     Social Drivers of Health     Food Insecurity: No Food Insecurity (3/15/2025)    NCSS - Food Insecurity     Worried About Running Out of Food in the Last Year: No     Ran Out of Food in the Last Year: No   Transportation  Needs: No Transportation Needs (3/15/2025)    NCSS - Transportation     Lack of Transportation: No   Housing Stability: Not At Risk (3/15/2025)    NCSS - Housing/Utilities     Has Housing: Yes     Worried About Losing Housing: No     Unable to Get Utilities: No                  Physical Exam     ED Triage Vitals   BP 03/18/25 0057 (!) 153/109   Pulse 03/18/25 0057 93   Resp 03/18/25 0057 22   Temp 03/18/25 0100 97.7 °F (36.5 °C)   Temp src 03/18/25 0100 Temporal   SpO2 03/18/25 0057 98 %   O2 Device 03/18/25 0057 None (Room air)       Current Vitals:   Vital Signs  BP: (!) 142/99  Pulse: 91  Resp: 24  Temp: 97.7 °F (36.5 °C)  Temp src: Temporal    Oxygen Therapy  SpO2: 97 %  O2 Device: None (Room air)        Physical Exam  Vitals and nursing note reviewed.   Constitutional:       General: He is not in acute distress.     Appearance: He is well-developed. He is obese. He is not toxic-appearing.   HENT:      Head: Normocephalic and atraumatic.   Eyes:      Extraocular Movements: Extraocular movements intact.      Pupils: Pupils are equal, round, and reactive to light.   Cardiovascular:      Rate and Rhythm: Normal rate and regular rhythm.   Pulmonary:      Effort: Pulmonary effort is normal. No tachypnea.      Breath sounds: No decreased breath sounds or wheezing.   Abdominal:      General: Bowel sounds are normal.      Palpations: Abdomen is soft.      Tenderness: There is no abdominal tenderness. There is no guarding or rebound.   Musculoskeletal:      Cervical back: Normal range of motion and neck supple.      Right lower leg: No edema.      Left lower leg: No edema.   Skin:     General: Skin is warm.      Capillary Refill: Capillary refill takes less than 2 seconds.   Neurological:      General: No focal deficit present.      Mental Status: He is alert and oriented to person, place, and time.   Psychiatric:         Mood and Affect: Mood normal.             ED Course     Labs Reviewed   CBC WITH DIFFERENTIAL WITH  PLATELET - Abnormal; Notable for the following components:       Result Value    RBC 2.53 (*)     HGB 8.3 (*)     HCT 25.2 (*)     All other components within normal limits   COMP METABOLIC PANEL (14) - Abnormal; Notable for the following components:    BUN 67 (*)     Creatinine 8.59 (*)     Calcium, Total 8.5 (*)     Calculated Osmolality 303 (*)     eGFR-Cr 7 (*)     AST 41 (*)     ALT 66 (*)     All other components within normal limits   TROPONIN I HIGH SENSITIVITY - Abnormal; Notable for the following components:    Troponin I (High Sensitivity) 237 (*)     All other components within normal limits   PRO BETA NATRIURETIC PEPTIDE - Abnormal; Notable for the following components:    Pro-Beta Natriuretic Peptide 6,357 (*)     All other components within normal limits   TROPONIN I HIGH SENSITIVITY - Abnormal; Notable for the following components:    Troponin I (High Sensitivity) 245 (*)     All other components within normal limits   RAPID SARS-COV-2 BY PCR - Normal   POCT FLU TEST - Normal    Narrative:     This assay is a rapid molecular in vitro test utilizing nucleic acid amplification of influenza A and B viral RNA.   LACTIC ACID, PLASMA   RAINBOW DRAW LAVENDER   RAINBOW DRAW LIGHT GREEN   RAINBOW DRAW BLUE   BLOOD CULTURE   BLOOD CULTURE     EKG    Rate, intervals and axes as noted on EKG Report.  Rate: 93  Rhythm: Sinus Rhythm  Reading: Normal sinus rhythm no ST elevation AK interval of 208 QRS of 108 QTc of 482 with axes of 30/25/88 no ST elevation                Chest x-ray shows increased right lower lung opacity nonspecific may be representing developing pneumonia/aspiration query right small pleural effusion.  No pneumothorax.  Additional findings stable       MDM      Social -negative tobacco, negative etoh, negative drugs  Family History-noncontributory  Past Medical History-bipolar disorder chronic kidney disease migraines diabetes high cholesterol CHF CVA dialysis patients, heart attack, neuropathy,  COPD, pulmonary embolism, osteoarthritis, hyperlipidemia, anxiety, colon polyp    Differential diagnosis before testing included CHF, viral syndrome, fluid overload, noncompliance, anxiety    Co-morbidities that add to the complexity of management include: Patient reported also having some rectal bleeding but he does had multiple polyps removed.  Patient's hemoglobin level is actually improved from 2 days ago low suspicion for active GI bleeding    Testing ordered during this visit included chest x-ray EKG baseline labs    Radiographic images  I personally reviewed the radiographs and my individual interpretation shows questionable right lung infiltrate  I also reviewed the official reports that showed Chest x-ray shows increased right lower lung opacity nonspecific may be representing developing pneumonia/aspiration query right small pleural effusion.  No pneumothorax.  Additional findings stable    External chart review showed review of Care Everywhere in [facility] system shows patient just was hospitalized for similar presentation had colonoscopy with polyp removal while in the hospital and was started on a nitro drip along with hydralazine for hypertensive urgency his blood pressure is improved here at this time but is still quite elevated    History obtained by an independent source included from patient    Discussion of management with patient    Social determinants of health that affect care include patient was just discharged from the hospital on 16 March      Medications Provided: Rocephin azithromycin Tylenol    Course of Events during Emergency Room Visit include 44-year-old male with frequent hospitalizations who presents emergency room for continued shortness of breath.  Patient was just discharged from the hospital on the 16th of this month at that time patient had received dialysis on Friday and Saturday.  He did get his dialysis today as well.  Will check baseline labs including BMP chest x-ray and EKG.   Patient had colon polyps removal his hemoglobin level is actually improved from 8 in the hospital stable suspicion for active GI bleeding      Patient's initial troponin is elevated but greatly decreased from previous.  As is his BMP.  Patient will have a second troponin as long as not trending up we will plan for discharge home his chest x-ray shows a questionable area of infiltrate given recent hospitalization will start course of antibiotics.  He has had no fever setting normally and has no white count.  Will discharge home as long as second troponin is not elevated further      Patient second troponin slightly elevated.  Patient discussed with cardiology given the slightly increased they will bring the patient and treating for the pneumonia and trending the troponins.  No further orders at this time.  I will order blood cultures and give IV dose of antibiotics of Rocephin and azithromycin.  Patient will be discussed with hospitalist.      Disposition:    Admission  I have discussed with the patient the results of test, differential diagnosis, and treatment plan. They expressed clear understanding of these instructions and agrees to the plan provided.      Admission disposition: 3/18/2025  3:20 AM           Medical Decision Making      Disposition and Plan     Clinical Impression:  1. Shortness of breath    2. Anemia, unspecified type    3. Elevated troponin    4. Elevated brain natriuretic peptide (BNP) level    5. Pneumonia of right lower lobe due to infectious organism         Disposition:  Admit  3/18/2025  3:20 am    Follow-up:  Adrian Arce MD  [address] [facility] [address]  [phone]    Schedule an appointment as soon as possible for a visit            Medications Prescribed:  Current Discharge Medication List        START taking these medications    Details   levoFLOXacin 750 MG Oral Tab Take 1 tablet (750 mg total) by mouth daily for 10 days.  Qty: 10 tablet, Refills: 0                  Supplementary Documentation:         Hospital Problems       Present on Admission  Date Reviewed: 1/18/2025            ICD-10-CM Noted POA    * (Principal) Shortness of breath R06.02 3/18/2025 Unknown

## 2025-03-19 LAB
ANION GAP SERPL CALC-SCNC: 14 MMOL/L (ref 0–18)
BUN BLD-MCNC: 74 MG/DL (ref 9–23)
CALCIUM BLD-MCNC: 8.8 MG/DL (ref 8.7–10.6)
CHLORIDE SERPL-SCNC: 106 MMOL/L (ref 98–112)
CO2 SERPL-SCNC: 22 MMOL/L (ref 21–32)
CREAT BLD-MCNC: 9.38 MG/DL
EGFRCR SERPLBLD CKD-EPI 2021: 6 ML/MIN/1.73M2 (ref 60–?)
ERYTHROCYTE [DISTWIDTH] IN BLOOD BY AUTOMATED COUNT: 14.1 %
GLUCOSE BLD-MCNC: 111 MG/DL (ref 70–99)
HCT VFR BLD AUTO: 22.9 %
HGB BLD-MCNC: 7.7 G/DL
M PNEUMO IGG ABS: 127 U/ML
M PNEUMO IGM ABS: <770 U/ML
MCH RBC QN AUTO: 32.4 PG (ref 26–34)
MCHC RBC AUTO-ENTMCNC: 33.6 G/DL (ref 31–37)
MCV RBC AUTO: 96.2 FL
OSMOLALITY SERPL CALC.SUM OF ELEC: 317 MOSM/KG (ref 275–295)
PLATELET # BLD AUTO: 168 10(3)UL (ref 150–450)
POTASSIUM SERPL-SCNC: 4.5 MMOL/L (ref 3.5–5.1)
RBC # BLD AUTO: 2.38 X10(6)UL
SODIUM SERPL-SCNC: 142 MMOL/L (ref 136–145)
WBC # BLD AUTO: 4.8 X10(3) UL (ref 4–11)

## 2025-03-19 PROCEDURE — 90935 HEMODIALYSIS ONE EVALUATION: CPT | Performed by: STUDENT IN AN ORGANIZED HEALTH CARE EDUCATION/TRAINING PROGRAM

## 2025-03-19 PROCEDURE — 80048 BASIC METABOLIC PNL TOTAL CA: CPT | Performed by: INTERNAL MEDICINE

## 2025-03-19 PROCEDURE — 85027 COMPLETE CBC AUTOMATED: CPT | Performed by: INTERNAL MEDICINE

## 2025-03-19 RX ORDER — DICYCLOMINE HYDROCHLORIDE 10 MG/1
10 CAPSULE ORAL
Status: DISCONTINUED | OUTPATIENT
Start: 2025-03-19 | End: 2025-03-20

## 2025-03-19 RX ORDER — TRAMADOL HYDROCHLORIDE 50 MG/1
50 TABLET ORAL EVERY 12 HOURS PRN
Status: DISCONTINUED | OUTPATIENT
Start: 2025-03-19 | End: 2025-03-20

## 2025-03-19 NOTE — PROGRESS NOTES
[facility] Hospitalist Progress Note     Subjective:  No overnight events.  Patient remains on room air.  Hemodialysis this afternoon.     Review of Systems  Comprehensive ROS reviewed and negative except for what is stated in HPI.      OBJECTIVE:  BP (!) 169/96 (BP Location: Right arm)   Pulse 97   Temp 97.8 °F (36.6 °C) (Oral)   Resp 20   Ht 6' 2\" (1.88 m)   Wt 257 lb (116.6 kg)   SpO2 94%   BMI 33.00 kg/m²   General: No apparent distress.  Alert and oriented.  HEENT:  EOMI, PERRLA.  Pulm: Diminished breath sounds.  Normal respiratory effort.  CV: Regular rate and rhythm, no murmur.   Abd: Soft, nontender, nondistended.   MSK: Full range of motion in extremities, no obvious deformities.    Skin: No lesions or rashes.  Neuro: No obvious focal deficits.      Data Review:    LABS:   Lab Results   Component Value Date    WBC 4.8 03/19/2025    HGB 7.7 03/19/2025    HCT 22.9 03/19/2025    .0 03/19/2025    CREATSERUM 9.38 03/19/2025    BUN 74 03/19/2025     03/19/2025    K 4.5 03/19/2025     03/19/2025    CO2 22.0 03/19/2025     03/19/2025    CA 8.8 03/19/2025    PGLU 165 03/18/2025       All lab work and imaging personally reviewed.    Assessment/Plan:     Assessment/ Plan:     #Dyspnea  CXR w/ RLL opacity vs effusion  Suspect worsening related to fluid status   -procal reviewed.  Continue rocephin/azithromycin.  -remains on room air  -follow urinary antigens  -nebs PRN     #Rectal bleeding  #Hemorrhoids w/ history of lower GIB   #Chronic anemia  -hemoglobin 8 on presentation- baseline  -evaluated by GI due to persistent complaints--bleeding likely due to hemorrhoids as evident by recent scope.  Discussed with patient option for hemorrhoidectomy and will arrange outpatient follow-up with general surgery.     #Elevated troponin likely 2/2 type II demand  -no chest pain  -cardiology on consult- echo pending      #ESRD  -nephro on consult for MWF HD management     #HTN  -resume  home antihypertensives     #Bipolar  #HFpEF  -resume home meds accordingly      DVT ppx: heparin subcu   Diet: renal   Disposition: depending clinical status - dc tomorrow     D/w patient and bedside RN      Outpatient records or previous hospital records reviewed.   [facility] hospitalist to continue to follow patient while in house.     Miguel Garsia DO  [facility] Hospitalist

## 2025-03-19 NOTE — PROGRESS NOTES
[facility] - Nephrology  Inpatient Follow-up    Godwin Fonseca Patient Status:  Observation    1980 MRN [id number]   [facility] 3NE-A Attending Miguel Garsia,    Hosp Day # 0 PCP Adrian Small MD       SUBJECTIVE:     Patient seen and examined at bedside. HD today.     MEDICATIONS:     Current Facility-Administered Medications   Medication Dose Route Frequency    albuterol (Ventolin HFA) 108 (90 Base) MCG/ACT inhaler 2 puff  2 puff Inhalation Q6H PRN    ARIPiprazole (Abilify) tab 10 mg  10 mg Oral Daily    ARIPiprazole (Abilify) tab 5 mg  5 mg Oral Daily    atorvastatin (Lipitor) tab 20 mg  20 mg Oral QPM    buPROPion ER (Wellbutrin XL) 24 hr tab 150 mg  150 mg Oral Daily    calcium acetate (Phoslo) cap 667 mg  667 mg Oral TID with meals    cloNIDine (Catapres) tab 0.1 mg  0.1 mg Oral BID    ergocalciferol (Vitamin D2) cap 50,000 Units  50,000 Units Oral Weekly    FLUoxetine (PROzac) cap 40 mg  40 mg Oral Daily    gabapentin (Neurontin) cap 200 mg  200 mg Oral TID    hydrALAZINE (Apresoline) tab 25 mg  25 mg Oral TID    lamoTRIgine (LaMICtal) tab 100 mg  100 mg Oral Daily    losartan (Cozaar) tab 100 mg  100 mg Oral Daily    NIFEdipine ER (Procardia-XL) 24 hr tab 30 mg  30 mg Oral Daily    sevelamer carbonate (Renvela) tab 2,400 mg  2,400 mg Oral TID CC    tamsulosin (Flomax) cap 0.4 mg  0.4 mg Oral Daily    [Held by provider] lisdexamfetamine (Vyvanse) cap 60 mg  60 mg Oral QAM    heparin (Porcine) 5000 UNIT/ML injection 5,000 Units  5,000 Units Subcutaneous Q8H MARTHA    acetaminophen (Tylenol Extra Strength) tab 500 mg  500 mg Oral Q4H PRN    acetaminophen (Tylenol) tab 650 mg  650 mg Oral Q4H PRN    Or    HYDROcodone-acetaminophen (Norco) 5-325 MG per tab 1 tablet  1 tablet Oral Q4H PRN    Or    HYDROcodone-acetaminophen (Norco) 5-325 MG per tab 2 tablet  2 tablet Oral Q4H PRN    polyethylene glycol (PEG 3350) (Miralax) 17 g oral packet 17 g  17 g Oral Daily PRN    sennosides  (Senokot) tab 17.2 mg  17.2 mg Oral Nightly PRN    bisacodyl (Dulcolax) 10 MG rectal suppository 10 mg  10 mg Rectal Daily PRN    ondansetron (Zofran) 4 MG/2ML injection 4 mg  4 mg Intravenous Q6H PRN    prochlorperazine (Compazine) 10 MG/2ML injection 5 mg  5 mg Intravenous Q8H PRN    benzonatate (Tessalon) cap 200 mg  200 mg Oral TID PRN    guaiFENesin (Robitussin) 100 MG/5 ML oral liquid 200 mg  200 mg Oral Q4H PRN    cefTRIAXone (Rocephin) 2 g in sodium chloride 0.9% 100 mL IVPB-ADDV  2 g Intravenous Q24H    azithromycin (Zithromax) tab 500 mg  500 mg Oral Daily    ondansetron (Zofran) 4 MG/2ML injection 4 mg  4 mg Intravenous Q6H PRN    pantoprazole (Protonix) 40 mg in sodium chloride 0.9% PF 10 mL IV push  40 mg Intravenous Q12H    ipratropium-albuterol (Duoneb) 0.5-2.5 (3) MG/3ML inhalation solution 3 mL  3 mL Nebulization Q4H PRN    sodium chloride 0.9 % IV bolus 100 mL  100 mL Intravenous Q30 Min PRN    And    albumin human (Albumin) 25% injection 25 g  25 g Intravenous PRN Dialysis    heparin (Porcine) 1000 UNIT/ML injection 1,500 Units  1.5 mL Intracatheter PRN Dialysis    polyethylene glycol (PEG 3350) (Miralax) 17 g oral packet 17 g  17 g Oral BID       PHYSICAL EXAM:     Vital Signs: BP (!) 150/110 (BP Location: Right arm)   Pulse 94   Temp 97.8 °F (36.6 °C) (Oral)   Resp 20   Ht 6' 2\" (1.88 m)   Wt 257 lb (116.6 kg)   SpO2 92%   BMI 33.00 kg/m²   Temp (24hrs), Av.7 °F (36.5 °C), Min:97.5 °F (36.4 °C), Max:97.8 °F (36.6 °C)       Intake/Output Summary (Last 24 hours) at 3/19/2025 1453  Last data filed at 3/19/2025 1200  Gross per 24 hour   Intake 236 ml   Output 4000 ml   Net -3764 ml     Wt Readings from Last 3 Encounters:   25 257 lb (116.6 kg)   25 241 lb 11.2 oz (109.6 kg)   03/10/25 251 lb 1.6 oz (113.9 kg)       General: no acute distress  HEENT: normocephalic, atraumatic  CV: RRR  Respiratory: no distress,  Skin: warm, dry  Neuro: awake, alert     LABORATORY DATA:     Lab  Results   Component Value Date     (H) 03/19/2025    BUN 74 (H) 03/19/2025    BUNCREA 13.0 03/07/2022    CREATSERUM 9.38 (H) 03/19/2025    ANIONGAP 14 03/19/2025    GFR 59 (L) 01/04/2018    GFRNAA 30 (L) 07/12/2022    GFRAA 35 (L) 07/12/2022    CA 8.8 03/19/2025    OSMOCALC 317 (H) 03/19/2025    ALKPHO 94 03/18/2025    AST 41 (H) 03/18/2025    ALT 66 (H) 03/18/2025    BILT 0.3 03/18/2025    TP 7.3 03/18/2025    ALB 4.0 03/18/2025    GLOBULIN 3.3 03/18/2025     03/19/2025    K 4.5 03/19/2025     03/19/2025    CO2 22.0 03/19/2025     Lab Results   Component Value Date    WBC 4.8 03/19/2025    RBC 2.38 (L) 03/19/2025    HGB 7.7 (L) 03/19/2025    HCT 22.9 (L) 03/19/2025    .0 03/19/2025    MPV 11.5 12/14/2012    MCV 96.2 03/19/2025    MCH 32.4 03/19/2025    MCHC 33.6 03/19/2025    RDW 14.1 03/19/2025    NEPRELIM 2.71 03/18/2025    NEPERCENT 54.8 03/18/2025    LYPERCENT 22.2 03/18/2025    MOPERCENT 14.7 03/18/2025    EOPERCENT 6.7 03/18/2025    BAPERCENT 1.4 03/18/2025    NE 2.71 03/18/2025    LYMABS 1.10 03/18/2025    MOABSO 0.73 03/18/2025    EOABSO 0.33 03/18/2025    BAABSO 0.07 03/18/2025     Lab Results   Component Value Date    MALBP 167.00 05/24/2023    CREUR 142.00 05/24/2023    CREUR 142.00 05/24/2023     Lab Results   Component Value Date    COLORUR Light-Yellow 11/17/2024    CLARITY Clear 11/17/2024    SPECGRAVITY 1.013 11/17/2024    GLUUR Normal 11/17/2024    BILUR Negative 11/17/2024    KETUR Negative 11/17/2024    BLOODURINE Negative 11/17/2024    PHURINE 8.5 (H) 11/17/2024    PROUR 100 (A) 11/17/2024    UROBILINOGEN Normal 11/17/2024    NITRITE Negative 11/17/2024    LEUUR Negative 11/17/2024    WBCUR 1-5 11/17/2024    RBCUR 0-2 11/17/2024    EPIUR Few (A) 11/17/2024    BACUR Rare (A) 11/17/2024    CAOXUR Occasional (A) 04/20/2018    HYLUR Present (A) 05/14/2019       IMAGING:     Reviewed    ASSESSMENT:      # ESRD on HD  -MWF schedule outpatient  -[facility] - planning  to transfer to [facility]   AVF     # HTN/Volume Status  -Home medications: carvedilol, hydralazine, losartan, nifedipine, clonidine     # Anemia     # Secondary Hyperparathyroidism     PLAN:      -HD per MWF schedule  -UF with HD as tolerated  -Continue home BP medications   -Defer OWEN in setting of high BPs  -Anemia management per primary  -Continue sevelamer   -Avoid nephrotoxins and renally dose medications for creatinine clearance  -Monitor intake and output daily  -Daily weights        We will continue to follow.    Thank you for allowing us to participate in the care of this patient.       Samantha Muñoz [facility] - Nephrology

## 2025-03-19 NOTE — PLAN OF CARE
Assumed care at 1930. A/Ox4, on room air, NSR on tele. Regular diet. Complaints of pain, MD hanson, one time dose of tramadol given - see mar. Heparin for VTE. Ambulates. PIV saline locked. IVPB rocephin Q24 hours per mar. Patient updated with plan of care. All needs met at this time.    Problem: PAIN - ADULT  Goal: Verbalizes/displays adequate comfort level or patient's stated pain goal  Description: INTERVENTIONS:- Encourage pt to monitor pain and request assistance- Assess pain using appropriate pain scale- Administer analgesics based on type and severity of pain and evaluate response- Implement non-pharmacological measures as appropriate and evaluate response- Consider cultural and social influences on pain and pain management- Manage/alleviate anxiety- Utilize distraction and/or relaxation techniques- Monitor for opioid side effects- Notify MD/LIP if interventions unsuccessful or patient reports new pain- Anticipate increased pain with activity and pre-medicate as appropriate  Outcome: Progressing     Problem: SAFETY ADULT - FALL  Goal: Free from fall injury  Description: INTERVENTIONS:- Assess pt frequently for physical needs- Identify cognitive and physical deficits and behaviors that affect risk of falls.- Friant fall precautions as indicated by assessment.- Educate pt/family on patient safety including physical limitations- Instruct pt to call for assistance with activity based on assessment- Modify environment to reduce risk of injury- Provide assistive devices as appropriate- Consider OT/PT consult to assist with strengthening/mobility- Encourage toileting schedule  Outcome: Progressing

## 2025-03-19 NOTE — CONSULTS
[facility]   Division of Cardiology   Consultation Note     Godwin Fonseca Patient Status:  Hospitalized    1980 MRN [id number]   Location [facility] 3NE-A Attending Miguel Garsia, DO   Hosp Day # 0 PCP Adrian Small MD     Impression:  PNA  Antibiotics per primary team  O2 as needed  Suspect his dry weight is too high and he has a component of volume contributing  BRBPR  Hgb stable in the 8s  Chronic anemia  Recent colonoscopy (Monday) with polyps  GI consulted  Troponinemia  Given ESRD, checking troponin is not helpful outside of the context of symptoms highly suspicious for MI  Would not trend  HTN   Longstanding HTN  On 5 drugs as outpatientDyslipidemia  On statin  ESRD  HD dependent  Planning today  Nephrology following  Hx MV repair  Surgery years ago  Echo with moderate MR 2025 and mild to moderate MS (10mmHg gradient at )    Plan:  Antibiotics.  GI consulted -> surgical evaluation for hemorrhoidectomy.  Nephrology following -> HD.  Echo to check MR given 2 admissions for dyspnea in 1 week.  No more troponins.       Chief Complaint: Dyspnea, congestion, cough.  Hematochezia.    History of Present Illness: Godwin Fonseca is a(n) 44 year old male with ESRD on HD, poorly controlled HTN, MV repair, and recent rectal bleeding presented with increasing dyspnea (to my history).   He was discharged  afternoon.  By Monday evening he had dyspnea again.  He notes there was a cough and it was somewhat different than his prior SOB/RAMSAY.  He did have some mild CP but mainly dyspnea and cough.  He obviously had exposure to URI/covid/influenza as he had just left the hospital.  He had a CXR which showed an infiltrate.  He was diagnosed with PNA and admitted for IV antibiotics and HD.    He also notes rectal bleeding.  He had polyps recently removed.  He also notes melena.  He has no significant abdominal pain.  No nausea or vomiting.    No palps, no  syncope.  No dietary indiscretion.  No change in meds recently.         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[facility])    Congestive heart disease ([facility])    COPD (chronic obstructive pulmonary disease) ([facility])    Coronary atherosclerosis    Deep vein thrombosis ([facility])    at age 17 R/T cast    Depression    Diabetes ([facility])    Dialysis patient    Diverticulosis of large intestine    Essential hypertension    3/21 echo: severe concentric LVH with normal EF and no MR or pHTN    Extrinsic asthma, unspecified    Heart attack ([facility])    2016- angiogram- no intervention    Heart valve disease    mitral valve repair in 1994/    High blood pressure    High cholesterol    History of blood transfusion    History of mitral valve repair    Hyperlipidemia    Low back pain    tight and stiff after sweeping and mopping    LVH (left ventricular hypertrophy)    Migraines    Mixed hyperlipidemia     HDL 38 LDL 97 VLDL 57     Monoclonal gammopathy    IgG kappa     Muscle weakness    MVP (mitral valve prolapse)    Repair 1994 at [place]; echoes as recently as 3/21 show mild or trivial MR and no stenosis    Neuropathy    Osteoarthritis    hip ,knees    Pneumonia due to organism    Pulmonary embolism ([facility])    Renal disorder    Stroke ([facility])    TIA (transient ischemic attack)    Initial history of left-sided weakness and slurred speech. (+) cocaine. MRI of the brain, CT angiogram of the head and neck, and 2D echo are all unremarkable.     TMJ (dislocation of temporomandibular joint)    Troponin level elevated    Trop 60 60 47 with TIA and no CP: Lexiscan negative with EF 51    Visual impairment     Past Surgical History:   Procedure Laterality Date    Av fistula revision, open Left     Cabg       Colonoscopy N/A 03/26/2023    Procedure: COLONOSCOPY;  Surgeon: Heath Vu MD;  Location:  ENDOSCOPY    Colonoscopy N/A 12/30/2023    Procedure: COLONOSCOPY with cold snare polypectomy and forcep polypectomy;  Surgeon: Ousmane Suarez MD;  Location:  ENDOSCOPY    Colonoscopy N/A 3/9/2025    Procedure: COLONOSCOPY WITH COLD SNARE POLYPECTOMY AND ENDO CLIPS X 2;  Surgeon: Bakari Noguera MD;  Location:  ENDOSCOPY    Colonoscopy & polypectomy  2019    Egd  2019    Duodenitis. Biopsied. EUS for weight loss was negative    Heart surgery      Hernia surgery  08/17/2022    Dr Barnes    Laminectomy,>2 sgmt,lumbar  09/06/2018    L4-L5 Decomp Discectomy ROEM L4-L5    Mitralplasty w cp bypass  1994    Christiano: Repair    Repair rotator cuff,chronic Left     torn and had a ruptured bicep    Sinus surgery        Spine surgery procedure unlisted      Valve repair  1994    mitral valve     Social History     Socioeconomic History    Marital status:    Tobacco Use    Smoking status: Former     Current packs/day: 0.00     Average packs/day: 1 pack/day for 27.0 years (27.0 ttl pk-yrs)     Types: Cigarettes     Start date: 2/28/1995     Quit date: 2/28/2022     Years since quitting: 3.0     Passive exposure: Never    Smokeless tobacco: Never   Vaping Use    Vaping status: Never Used   Substance and Sexual Activity    Alcohol use: No    Drug use: No     Family History   Problem Relation Age of Onset    Hypertension Father     Alcohol and Other Disorders Associated Father     Substance Abuse Father         cocaine    Dementia Father     Cancer Father         lung    Diabetes Mother     Cancer Mother         multiple myeloma    Hypertension Mother     Anxiety Maternal Aunt     Depression Maternal Aunt     Anxiety Maternal Aunt     Depression Maternal Aunt     Bipolar Disorder Maternal Aunt     Diabetes Maternal Grandmother     Hypertension Maternal Grandmother     Cancer Maternal Grandfather         stomach cancer     Diabetes Maternal Grandfather     Hypertension Maternal Grandfather     Alcohol and Other Disorders Associated Maternal Grandfather     Hypertension Paternal Grandmother     Hypertension Paternal Grandfather     Cancer Sister         uterine and ovarian    Hypertension Sister     Cancer Maternal Uncle         lung    Cancer Paternal Aunt         throat        Inpatient Medications:    ARIPiprazole  10 mg Oral Daily    ARIPiprazole  5 mg Oral Daily    atorvastatin  20 mg Oral QPM    buPROPion ER  150 mg Oral Daily    calcium acetate  667 mg Oral TID with meals    cloNIDine  0.1 mg Oral BID    ergocalciferol  50,000 Units Oral Weekly    FLUoxetine HCl  40 mg Oral Daily    gabapentin  200 mg Oral TID    hydrALAZINE  25 mg Oral TID    lamoTRIgine  100 mg Oral Daily    losartan  100 mg Oral Daily    NIFEdipine ER  30 mg Oral Daily    sevelamer carbonate  2,400 mg Oral TID CC    tamsulosin  0.4 mg Oral Daily    [Held by provider] Lisdexamfetamine Dimesylate  60 mg Oral QAM    heparin  5,000 Units Subcutaneous Q8H MARTHA    cefTRIAXone  2 g Intravenous Q24H    azithromycin  500 mg Oral Daily    pantoprazole  40 mg Intravenous Q12H    polyethylene glycol (PEG 3350)  17 g Oral BID       Continuous Infusions:       PRN Medications:     albuterol    acetaminophen    acetaminophen **OR** HYDROcodone-acetaminophen **OR** HYDROcodone-acetaminophen    polyethylene glycol (PEG 3350)    sennosides    bisacodyl    ondansetron    prochlorperazine    benzonatate    guaiFENesin    ondansetron    ipratropium-albuterol    sodium chloride **AND** albumin human    heparin    Outpatient Medications:   Medications Ordered Prior to Encounter[1]    Home Medications:  Medications Taking[2]    Allergies:   Allergies[3]     Review of Systems:   Ten point review of systems is unremarkable except as mentioned above.     Vitals:   BP (!) 145/92 (BP Location: Right arm)   Pulse 91   Temp 97.7 °F (36.5 °C) (Oral)   Resp 20   Ht 188 cm (6' 2\")   Wt  257 lb (116.6 kg)   SpO2 92%   BMI 33.00 kg/m²   Wt Readings from Last 3 Encounters:   03/18/25 257 lb (116.6 kg)   03/16/25 241 lb 11.2 oz (109.6 kg)   03/10/25 251 lb 1.6 oz (113.9 kg)       Examination:  General: Well developed, well nourished male.  No distress.  Neck:  No JVD.  Normal ROM.  Cardiac: Regular rate and rhythm.  No murmurs, rubs, or gallops.   Lungs: Clear to ascultation bilaterally.    Abdomen: Soft.  Non-distended.  Non-tender.  Bowel sounds present.    Extremities: Warm, no significant edema.  Palpable pulses.  Neurologic: Alert and oriented.  No gross deficits.  Integument:  No visible rashes identified.  Psych: The patient's affect is appropriate.     Labs:   Recent Labs   Lab 03/14/25 2141 03/16/25 0355 03/18/25  0106   WBC 7.2 6.1 5.0   HGB 8.2* 7.9* 8.3*   MCV 96.8 97.2 99.6   .0 172.0 179.0     Recent Labs   Lab 03/14/25 2141 03/16/25 0355 03/18/25  0106    139 137   K 4.1 4.0 4.4    100 105   CO2 27.0 30.0 21.0   BUN 62* 43* 67*   CREATSERUM 6.01* 5.73* 8.59*   * 111* 95   CA 9.0 8.7 8.5*     Recent Labs   Lab 03/14/25 2141 03/18/25  0106   ALT 65* 66*   AST 60* 41*   ALB 4.0 4.0     Lab Results   Component Value Date    A1C 4.6 03/08/2024    A1C 5.4 08/24/2023    A1C 5.0 04/06/2023     Lab Results   Component Value Date    CHOLEST 200 (H) 03/14/2025    HDL 56 03/14/2025     (H) 03/14/2025    TRIG 211 (H) 03/14/2025        Studies:   Ekg: Sinus.  LVH strain.  Similar to prior tracings (which vary greatly).  Tele: Stable.  Echo (1/2025):   1. Left ventricle: The cavity size was normal. Wall thickness was moderately increased. There was concentric hypertrophy. Systolic function was normal. The estimated ejection fraction was 60-65%, by visual assessment.   2. Left atrium: The left atrial volume was markedly increased.   3. Right atrium: The atrium was mildly dilated.   4. Mitral valve: A prosthetic device is present. The prosthesis had a normal range  of motion. The sewing ring appeared normal, had no rocking motion, and showed no evidence of dehiscence. Leaflet separation was mildly      reduced. Transvalvular velocity was increased. The findings were consistent with mild stenosis. There was moderate regurgitation. The mean diastolic gradient was 10mm Hg at a heart rate 108 bpm.   Thank you for allowing [facility] to care for your patient. Please contact me with any questions!     Benja Reyes MD  General, Interventional  Structural & Endovascular  Cardiology             [1]   Current Facility-Administered Medications on File Prior to Encounter   Medication Dose Route Frequency Provider Last Rate Last Admin    [COMPLETED] HYDROmorphone (Dilaudid) 1 MG/ML injection 0.5 mg  0.5 mg Intravenous Once Gama Ray, DO   0.5 mg at 03/15/25 0037    [COMPLETED] hydrALAzine (Apresoline) 20 mg/mL injection 10 mg  10 mg Intravenous Once Gama Ray, DO   10 mg at 03/15/25 0126    [COMPLETED] HYDROmorphone (Dilaudid) 1 MG/ML injection 0.5 mg  0.5 mg Intravenous Once Gama Ray, DO   0.5 mg at 25 2302    [] HYDROmorphone (Dilaudid) 1 MG/ML injection             [COMPLETED] iopamidol 76% (ISOVUE-370) injection for power injector  100 mL Intravenous ONCE PRN Gama Ray, DO   100 mL at 25 2352    [COMPLETED] epoetin sylvia (Epogen, Procrit) 5,000 Units injection  5,000 Units Subcutaneous Once Samaria Nava MD   5,000 Units at 03/10/25 1647    [COMPLETED] morphINE PF 4 MG/ML injection 4 mg  4 mg Intravenous Once Cody Chilel MD   4 mg at 25 0018    [COMPLETED] polyethylene glycol-electrolyte (Golytely) 236 g oral solution 4,000 mL  4,000 mL Oral Once Bakari Noguera MD   4,000 mL at 25 0315    [] naloxone (Narcan) 0.4 MG/ML injection 80 mcg  80 mcg Intravenous PRN Edmond Esquivel MD        [COMPLETED] morphINE PF 4 MG/ML injection 4 mg  4 mg Intravenous Once Cody Chilel MD   4 mg at 03/08/25 2229    [COMPLETED] iopamidol 76%  (ISOVUE-370) injection for power injector  100 mL Intravenous ONCE PRN Cody Chilel MD   100 mL at 25 2317    [COMPLETED] epoetin sylvia (Epogen, Procrit) 5,000 Units injection  5,000 Units Intravenous Once Lenka Bond MD   5,000 Units at 25 1600    [COMPLETED] butalbital-acetaminophen-caffeine (Fioricet) -40 MG per tab 1 tablet  1 tablet Oral Once Angus Ferguson MD   1 tablet at 25 0006    [COMPLETED] potassium chloride (Klor-Con M20) tab 20 mEq  20 mEq Oral Once Lenka Bond MD   20 mEq at 25 0943    [COMPLETED] epoetin sylvia (Epogen, Procrit) 5,000 Units injection  5,000 Units Intravenous Once Lenka Bond MD   5,000 Units at 25 1400    [COMPLETED] iopamidol 76% (ISOVUE-370) injection for power injector  100 mL Intravenous ONCE PRN Regina Salmon MD   100 mL at 25 1316    [COMPLETED] ondansetron (Zofran) 4 MG/2ML injection 4 mg  4 mg Intravenous Once Gavi Charles MD   4 mg at 25 1734    [COMPLETED] labetalol (Trandate) 5 mg/mL injection 20 mg  20 mg Intravenous Once Gavi Charles MD   20 mg at 25 1734    [COMPLETED] HYDROmorphone (Dilaudid) 1 MG/ML injection 0.5 mg  0.5 mg Intravenous Q30 Min PRN Gavi Charles MD   0.5 mg at 25 2031    [COMPLETED] labetalol (Trandate) 5 mg/mL injection 20 mg  20 mg Intravenous Once Gavi Charles MD   20 mg at 25 1830    [COMPLETED] piperacillin-tazobactam (Zosyn) 4.5 g in dextrose 5% 100 mL IVPB-ADDV  4.5 g Intravenous Once Gavi Charles MD   Stopped at 25 1916    [] sodium chloride 0.9 % IV bolus 100 mL  100 mL Intravenous Q30 Min PRN Samaria Nava MD        And    [] albumin human (Albumin) 25% injection 25 g  25 g Intravenous PRN Dialysis Samaria Nava MD        [COMPLETED] HYDROmorphone (Dilaudid) 1 MG/ML injection 0.5 mg  0.5 mg Intravenous Once Yuriy Forrest MD   0.5 mg at 25 5773    [COMPLETED] vancomycin (Vancocin)  750 mg in sodium chloride 0.9% 250 mL IVPB-ADDV  7.5 mg/kg Intravenous Once Malissa Monk  mL/hr at 25 1423 750 mg at 25 1423    [COMPLETED] HYDROmorphone (Dilaudid) 1 MG/ML injection 0.5 mg  0.5 mg Intravenous Once Jhonny Rebolledo MD   0.5 mg at 25 0142    [COMPLETED] HYDROmorphone (Dilaudid) 1 MG/ML injection 0.4 mg  0.4 mg Intravenous Once Ria Gutierrez MD   0.4 mg at 25 1853    [COMPLETED] vancomycin (Vancocin) 750 mg in sodium chloride 0.9% 250 mL IVPB-ADDV  7.5 mg/kg Intravenous Once Jagdeep Joseph  mL/hr at 25 2157 750 mg at 25 215    [COMPLETED] lidocaine-EPINEPHrine (Xylocaine-Epinephrine) 2 %-1:[id number] injection             [COMPLETED] lidocaine (Xylocaine) 1 % injection             [COMPLETED] fentaNYL (Sublimaze) 50 mcg/mL injection             [COMPLETED] vancomycin (Vancocin) 2.25 g in sodium chloride 0.9% 500 mL IVPB premix  25 mg/kg Intravenous Once Stacy Shane MD   Stopped at 25 1038    [COMPLETED] morphINE PF 4 MG/ML injection 4 mg  4 mg Intravenous Once Stacy Shane MD   4 mg at 25 0746    [COMPLETED] cloNIDine (Catapres) tab 0.1 mg  0.1 mg Oral Once Stacy Shane MD   0.1 mg at 25 0916    [COMPLETED] hydrALAzine (Apresoline) 20 mg/mL injection 10 mg  10 mg Intravenous Once Stacy Shane MD   10 mg at 25 0916    [COMPLETED] losartan (Cozaar) tab 100 mg  100 mg Oral Once Stacy Shane MD   100 mg at 25 0952    [COMPLETED] carvedilol (Coreg) tab 25 mg  25 mg Oral Once Stacy Shane MD   25 mg at 25 0952    [COMPLETED] NIFEdipine ER (Procardia-XL) 24 hr tab 30 mg  30 mg Oral Once Stacy Shane MD   30 mg at 25 1013    [COMPLETED] fentaNYL (Sublimaze) 50 mcg/mL injection 50 mcg  50 mcg Intravenous Once Stacy Shane MD   50 mcg at 25 1034    [] sodium chloride 0.9 % IV bolus 100 mL  100 mL Intravenous Q30 Min PRN Samaria Nava MD        And    [] albumin human (Albumin) 25% injection 25 g  25  g Intravenous PRN Dialysis Samaria Nava MD        [COMPLETED] heparin (Porcine) 1000 UNIT/ML injection 1,500 Units  1.5 mL Intracatheter Once Samaria Nava MD   1,500 Units at 02/02/25 1602    [COMPLETED] epoetin sylvia (Epogen, Procrit) 5,000 Units injection  5,000 Units Intravenous Once Lenka Bond MD   5,000 Units at 01/22/25 1610    [COMPLETED] vancomycin (Vancocin) 1,000 mg in sodium chloride 0.9% 250 mL IVPB-ADDV  10 mg/kg Intravenous Once Selam Jesus  mL/hr at 01/22/25 1612 1,000 mg at 01/22/25 1612    [COMPLETED] vancomycin (Vancocin) 1,000 mg in sodium chloride 0.9% 250 mL IVPB-ADDV  1,000 mg Intravenous Once Vandana Baker PharmD 250 mL/hr at 01/20/25 1746 1,000 mg at 01/20/25 1746    [COMPLETED] epoetin sylvia (Epogen, Procrit) 56441 UNIT/ML injection 10,000 Units  10,000 Units Intravenous Once Lenka Bond MD   10,000 Units at 01/20/25 1230    [COMPLETED] vancomycin (Vancocin) 2.25 g in sodium chloride 0.9% 500 mL IVPB premix  25 mg/kg Intravenous Once Mathew Elizabeth  mL/hr at 01/19/25 2033 2,250 mg at 01/19/25 2033    [COMPLETED] aspirin chewable tab 324 mg  324 mg Oral Once Deanna Cervantes MD   324 mg at 01/18/25 1111    [COMPLETED] ondansetron (Zofran) 4 MG/2ML injection 4 mg  4 mg Intravenous Once Deanna Cervantes MD   4 mg at 01/18/25 1031    [COMPLETED] acetaminophen (Tylenol Extra Strength) tab 1,000 mg  1,000 mg Oral Once Deanna Cervantes MD   1,000 mg at 01/18/25 1221    [COMPLETED] acetaminophen (Tylenol Extra Strength) tab 1,000 mg  1,000 mg Oral Once Mary Lou Feldman DO   1,000 mg at 01/07/25 0315    [COMPLETED] ketorolac (Toradol) 15 MG/ML injection 15 mg  15 mg Intravenous Once Mary Lou Feldman DO   15 mg at 01/07/25 0315    [COMPLETED] ketorolac (Toradol) 30 MG/ML injection 30 mg  30 mg Intravenous Once Del Cordova MD   30 mg at 01/06/25 0615    [COMPLETED] acetaminophen (Tylenol Extra Strength) tab 1,000 mg  1,000 mg Oral Once Del Cordova MD   1,000 mg at  25 0736    [COMPLETED] HYDROmorphone (Dilaudid) 1 MG/ML injection        Given at 24 0427    [COMPLETED] iopamidol 76% (ISOVUE-370) injection for power injector  100 mL Intravenous ONCE PRN Gama Ray, DO   100 mL at 24 1730    [COMPLETED] HYDROmorphone (Dilaudid) 1 MG/ML injection 0.5 mg  0.5 mg Intravenous Once Gama Ray, DO   0.5 mg at 24 1852    [COMPLETED] HYDROmorphone (Dilaudid) 1 MG/ML injection 0.5 mg  0.5 mg Intravenous Once Vicky Weiss, DO   0.5 mg at 24 2249    [COMPLETED] epoetin sylvia (Epogen, Procrit) 48393 UNIT/ML injection 10,000 Units  10,000 Units Subcutaneous Once Lenka Bond MD   10,000 Units at 24    [COMPLETED] heparin (Porcine) 1000 UNIT/ML injection 1,500 Units  1,500 Units Intracatheter Once Samaria Nava MD   1,500 Units at 24    [COMPLETED] sodium ferric gluconate (Ferrlecit) 125 mg in sodium chloride 0.9% 100mL IVPB premix  125 mg Intravenous Once Lenka Bond MD   Stopped at 24    [] sodium chloride 0.9 % IV bolus 100 mL  100 mL Intravenous Q30 Min PRN Lenka Bond MD        And    [] albumin human (Albumin) 25% injection 25 g  25 g Intravenous PRN Dialysis Lenka Bond MD        [] heparin (Porcine) 1000 UNIT/ML injection 1,500 Units  1.5 mL Intracatheter Once Lenka Bond MD         Current Outpatient Medications on File Prior to Encounter   Medication Sig Dispense Refill    polyethylene glycol, PEG 3350, 17 g Oral Powd Pack Take 17 g by mouth daily as needed.      Phenylephrine HCl (PREPARATION H) 0.25 % Rectal Suppos Place 1 suppository rectally 4 (four) times daily as needed (Hemorrhoids after bowel movements). 24 suppository 0    atorvastatin 20 MG Oral Tab Take 1 tablet (20 mg total) by mouth every evening.      HYDROcodone-acetaminophen  MG Oral Tab Take 1 tablet by mouth every 6 (six) hours as needed for Pain.      hydrALAZINE 25 MG Oral Tab Take 1 tablet (25 mg total)  by mouth 3 (three) times daily.      cloNIDine 0.1 MG Oral Tab Take 1 tablet (0.1 mg total) by mouth 2 (two) times daily. 60 tablet 0    NIFEdipine ER 30 MG Oral Tablet 24 Hr Take 1 tablet (30 mg total) by mouth daily. 30 tablet 0    sevelamer carbonate 800 MG Oral Tab Take 3 tablets (2,400 mg total) by mouth 3 (three) times daily with meals. 270 tablet 0    calcium acetate 667 MG Oral Cap Take 1 capsule (667 mg total) by mouth with breakfast, with lunch, and with evening meal. 90 capsule 0    ARIPiprazole 5 MG Oral Tab Take 1 tablet (5 mg total) by mouth daily. Take 5mg (1 tablet) by mouth daily. Take each dose with 1 tablet of 10mg aripiprazole for a total daily dose of 15mg aripiprazole.      ARIPiprazole 10 MG Oral Tab Take 1 tablet (10 mg total) by mouth daily. Take 10mg (1 tablet) by mouth daily. Take each dose with 1 tablet of 5mg aripiprazole for a total daily dose of 15mg aripiprazole.      ergocalciferol 1.25 MG (72438 UT) Oral Cap Take 1 capsule (50,000 Units total) by mouth once a week.      losartan 100 MG Oral Tab Take 1 tablet (100 mg total) by mouth daily.      ondansetron 4 MG Oral Tablet Dispersible Take 1 tablet (4 mg total) by mouth every 8 (eight) hours as needed for Nausea.      gabapentin 100 MG Oral Cap Take 2 capsules (200 mg total) by mouth 3 (three) times daily. 180 capsule 0    VYVANSE 60 MG Oral Cap Take 1 capsule (60 mg total) by mouth every morning.      lamoTRIgine 100 MG Oral Tab Take 1 tablet (100 mg total) by mouth daily.      albuterol 108 (90 Base) MCG/ACT Inhalation Aero Soln Inhale 2 puffs into the lungs every 6 (six) hours as needed for Wheezing.      tamsulosin 0.4 MG Oral Cap Take 1 capsule (0.4 mg total) by mouth daily.      buPROPion  MG Oral Tablet 24 Hr Take 1 tablet (150 mg total) by mouth daily.      FLUoxetine HCl 40 MG Oral Cap Take 1 capsule (40 mg total) by mouth daily. 7 capsule 0    carvedilol 25 MG Oral Tab Take 1 tablet (25 mg total) by mouth in the  morning and 1 tablet (25 mg total) in the evening. Take with meals. 60 tablet 6    Fluticasone Propionate 50 MCG/ACT Nasal Suspension SPRAY ONCE INTO EACH NOSTRIL BID PRN 15.8 mL 0   [2]   Outpatient Medications Marked as Taking for the 3/18/25 encounter (Hospital Encounter)   Medication Sig Dispense Refill    levoFLOXacin 750 MG Oral Tab Take 1 tablet (750 mg total) by mouth daily for 10 days. 10 tablet 0    polyethylene glycol, PEG 3350, 17 g Oral Powd Pack Take 17 g by mouth daily as needed.      Phenylephrine HCl (PREPARATION H) 0.25 % Rectal Suppos Place 1 suppository rectally 4 (four) times daily as needed (Hemorrhoids after bowel movements). 24 suppository 0    atorvastatin 20 MG Oral Tab Take 1 tablet (20 mg total) by mouth every evening.      HYDROcodone-acetaminophen  MG Oral Tab Take 1 tablet by mouth every 6 (six) hours as needed for Pain.      hydrALAZINE 25 MG Oral Tab Take 1 tablet (25 mg total) by mouth 3 (three) times daily.      cloNIDine 0.1 MG Oral Tab Take 1 tablet (0.1 mg total) by mouth 2 (two) times daily. 60 tablet 0    NIFEdipine ER 30 MG Oral Tablet 24 Hr Take 1 tablet (30 mg total) by mouth daily. 30 tablet 0    sevelamer carbonate 800 MG Oral Tab Take 3 tablets (2,400 mg total) by mouth 3 (three) times daily with meals. 270 tablet 0    calcium acetate 667 MG Oral Cap Take 1 capsule (667 mg total) by mouth with breakfast, with lunch, and with evening meal. 90 capsule 0    ARIPiprazole 5 MG Oral Tab Take 1 tablet (5 mg total) by mouth daily. Take 5mg (1 tablet) by mouth daily. Take each dose with 1 tablet of 10mg aripiprazole for a total daily dose of 15mg aripiprazole.      ARIPiprazole 10 MG Oral Tab Take 1 tablet (10 mg total) by mouth daily. Take 10mg (1 tablet) by mouth daily. Take each dose with 1 tablet of 5mg aripiprazole for a total daily dose of 15mg aripiprazole.      ergocalciferol 1.25 MG (71509 UT) Oral Cap Take 1 capsule (50,000 Units total) by mouth once a week.       losartan 100 MG Oral Tab Take 1 tablet (100 mg total) by mouth daily.      ondansetron 4 MG Oral Tablet Dispersible Take 1 tablet (4 mg total) by mouth every 8 (eight) hours as needed for Nausea.      gabapentin 100 MG Oral Cap Take 2 capsules (200 mg total) by mouth 3 (three) times daily. 180 capsule 0    VYVANSE 60 MG Oral Cap Take 1 capsule (60 mg total) by mouth every morning.      lamoTRIgine 100 MG Oral Tab Take 1 tablet (100 mg total) by mouth daily.      albuterol 108 (90 Base) MCG/ACT Inhalation Aero Soln Inhale 2 puffs into the lungs every 6 (six) hours as needed for Wheezing.      tamsulosin 0.4 MG Oral Cap Take 1 capsule (0.4 mg total) by mouth daily.      buPROPion  MG Oral Tablet 24 Hr Take 1 tablet (150 mg total) by mouth daily.      FLUoxetine HCl 40 MG Oral Cap Take 1 capsule (40 mg total) by mouth daily. 7 capsule 0    carvedilol 25 MG Oral Tab Take 1 tablet (25 mg total) by mouth in the morning and 1 tablet (25 mg total) in the evening. Take with meals. 60 tablet 6    Fluticasone Propionate 50 MCG/ACT Nasal Suspension SPRAY ONCE INTO EACH NOSTRIL BID PRN 15.8 mL 0   [3]   Allergies  Allergen Reactions    Hydrochlorothiazide RASH and HIVES

## 2025-03-19 NOTE — PLAN OF CARE
Pt adm w LUQ pain, SOB.  Denies pain and sob at this time.   Pt currently receiving dialysis treatment. GI & cardiology stated no Intervention. Will continue to monitor.       Problem: PAIN - ADULT  Goal: Verbalizes/displays adequate comfort level or patient's stated pain goal  Description: INTERVENTIONS:- Encourage pt to monitor pain and request assistance- Assess pain using appropriate pain scale- Administer analgesics based on type and severity of pain and evaluate response- Implement non-pharmacological measures as appropriate and evaluate response- Consider cultural and social influences on pain and pain management- Manage/alleviate anxiety- Utilize distraction and/or relaxation techniques- Monitor for opioid side effects- Notify MD/LIP if interventions unsuccessful or patient reports new pain- Anticipate increased pain with activity and pre-medicate as appropriate  Outcome: Progressing     Problem: SAFETY ADULT - FALL  Goal: Free from fall injury  Description: INTERVENTIONS:- Assess pt frequently for physical needs- Identify cognitive and physical deficits and behaviors that affect risk of falls.- [place] fall precautions as indicated by assessment.- Educate pt/family on patient safety including physical limitations- Instruct pt to call for assistance with activity based on assessment- Modify environment to reduce risk of injury- Provide assistive devices as appropriate- Consider OT/PT consult to assist with strengthening/mobility- Encourage toileting schedule  Outcome: Progressing

## 2025-03-20 ENCOUNTER — APPOINTMENT (OUTPATIENT)
Dept: CV DIAGNOSTICS | Facility: HOSPITAL | Age: 45
End: 2025-03-20
Attending: INTERNAL MEDICINE
Payer: MEDICARE

## 2025-03-20 VITALS
BODY MASS INDEX: 32.98 KG/M2 | RESPIRATION RATE: 15 BRPM | DIASTOLIC BLOOD PRESSURE: 96 MMHG | HEIGHT: 74 IN | SYSTOLIC BLOOD PRESSURE: 135 MMHG | HEART RATE: 95 BPM | WEIGHT: 257 LBS | OXYGEN SATURATION: 96 % | TEMPERATURE: 98 F

## 2025-03-20 LAB
ANION GAP SERPL CALC-SCNC: 10 MMOL/L (ref 0–18)
BUN BLD-MCNC: 50 MG/DL (ref 9–23)
CALCIUM BLD-MCNC: 8.9 MG/DL (ref 8.7–10.6)
CHLORIDE SERPL-SCNC: 104 MMOL/L (ref 98–112)
CO2 SERPL-SCNC: 28 MMOL/L (ref 21–32)
CREAT BLD-MCNC: 7.91 MG/DL
EGFRCR SERPLBLD CKD-EPI 2021: 8 ML/MIN/1.73M2 (ref 60–?)
ERYTHROCYTE [DISTWIDTH] IN BLOOD BY AUTOMATED COUNT: 14.1 %
GLUCOSE BLD-MCNC: 101 MG/DL (ref 70–99)
HCT VFR BLD AUTO: 23.4 %
HGB BLD-MCNC: 7.9 G/DL
MCH RBC QN AUTO: 32.4 PG (ref 26–34)
MCHC RBC AUTO-ENTMCNC: 33.8 G/DL (ref 31–37)
MCV RBC AUTO: 95.9 FL
OSMOLALITY SERPL CALC.SUM OF ELEC: 307 MOSM/KG (ref 275–295)
PLATELET # BLD AUTO: 177 10(3)UL (ref 150–450)
POTASSIUM SERPL-SCNC: 4.1 MMOL/L (ref 3.5–5.1)
RBC # BLD AUTO: 2.44 X10(6)UL
SODIUM SERPL-SCNC: 142 MMOL/L (ref 136–145)
WBC # BLD AUTO: 4.2 X10(3) UL (ref 4–11)

## 2025-03-20 PROCEDURE — 93306 TTE W/DOPPLER COMPLETE: CPT | Performed by: INTERNAL MEDICINE

## 2025-03-20 PROCEDURE — 80048 BASIC METABOLIC PNL TOTAL CA: CPT | Performed by: STUDENT IN AN ORGANIZED HEALTH CARE EDUCATION/TRAINING PROGRAM

## 2025-03-20 PROCEDURE — 94760 N-INVAS EAR/PLS OXIMETRY 1: CPT

## 2025-03-20 PROCEDURE — 85027 COMPLETE CBC AUTOMATED: CPT | Performed by: STUDENT IN AN ORGANIZED HEALTH CARE EDUCATION/TRAINING PROGRAM

## 2025-03-20 RX ORDER — AMOXICILLIN AND CLAVULANATE POTASSIUM 500; 125 MG/1; MG/1
1 TABLET, FILM COATED ORAL
Qty: 7 TABLET | Refills: 0 | Status: SHIPPED | OUTPATIENT
Start: 2025-03-20 | End: 2025-03-27

## 2025-03-20 RX ORDER — BENZONATATE 200 MG/1
200 CAPSULE ORAL 3 TIMES DAILY PRN
Qty: 20 CAPSULE | Refills: 0 | Status: SHIPPED | OUTPATIENT
Start: 2025-03-20

## 2025-03-20 NOTE — PROGRESS NOTES
[facility]   Division of Cardiology   Consultation Note     Godwin Fonseca Patient Status:  Hospitalized    1980 MRN [id number]   Location [facility] 3NE-A Attending Miguel Garsia,    Hosp Day # 1 PCP Adrian Small MD     Impression:  PNA  Antibiotics per primary team  O2 as needed  Suspect his dry weight is too high and he has a component of volume contributing  BRBPR  Hgb stable in the 8s  Chronic anemia  Recent colonoscopy (Monday) with polyps  GI consulted  Troponinemia  Given ESRD, checking troponin is not helpful outside of the context of symptoms highly suspicious for MI  Would not trend  HTN   Longstanding HTN  On 5 drugs as outpatient  Dyslipidemia  On statin  ESRD  HD dependent  Planning today  Nephrology following  Hx MV repair  Surgery years ago  Echo with moderate MR 2025 and mild to moderate MS (10mmHg gradient at )    Plan:  Ok to DC.  FU with Cardiology; has outpatient cardiologist.   Echo stable with moderate MR.  FU with surgery re: hemorrhoids.  HD per nephrology.  Antibiotics per primary service.       Chief Complaint: Dyspnea, congestion, cough.  Hematochezia.    Subjective:  No overnight events.  CV stable.  Echo done.       Inpatient Medications:    dicyclomine  10 mg Oral TID AC&HS    ARIPiprazole  10 mg Oral Daily    ARIPiprazole  5 mg Oral Daily    atorvastatin  20 mg Oral QPM    buPROPion ER  150 mg Oral Daily    calcium acetate  667 mg Oral TID with meals    cloNIDine  0.1 mg Oral BID    ergocalciferol  50,000 Units Oral Weekly    FLUoxetine HCl  40 mg Oral Daily    gabapentin  200 mg Oral TID    hydrALAZINE  25 mg Oral TID    lamoTRIgine  100 mg Oral Daily    losartan  100 mg Oral Daily    NIFEdipine ER  30 mg Oral Daily    sevelamer carbonate  2,400 mg Oral TID CC    tamsulosin  0.4 mg Oral Daily    [Held by provider] Lisdexamfetamine Dimesylate  60 mg Oral QAM    heparin  5,000 Units Subcutaneous Q8H MARTHA    cefTRIAXone   2 g Intravenous Q24H    azithromycin  500 mg Oral Daily    pantoprazole  40 mg Intravenous Q12H    polyethylene glycol (PEG 3350)  17 g Oral BID        Vitals:   BP (!) 135/96 (BP Location: Right arm)   Pulse 95   Temp 97.9 °F (36.6 °C) (Oral)   Resp 15   Ht 188 cm (6' 2\")   Wt 257 lb (116.6 kg)   SpO2 96%   BMI 33.00 kg/m²   Wt Readings from Last 3 Encounters:   03/18/25 257 lb (116.6 kg)   03/16/25 241 lb 11.2 oz (109.6 kg)   03/10/25 251 lb 1.6 oz (113.9 kg)       Examination:  General: Well developed, well nourished male.  No distress.  Neck:  No JVD.  Normal ROM.  Cardiac: Regular rate and rhythm.  No murmurs, rubs, or gallops.   Lungs: Clear to ascultation bilaterally.    Abdomen: Soft.  Non-distended.  Non-tender.  Bowel sounds present.    Extremities: Warm, no significant edema.  Palpable pulses.  Neurologic: Alert and oriented.  No gross deficits.  Integument:  No visible rashes identified.  Psych: The patient's affect is appropriate.     Labs:   Recent Labs   Lab 03/14/25 2141 03/16/25  0355 03/18/25  0106 03/19/25  0741 03/20/25  0650   WBC 7.2 6.1 5.0 4.8 4.2   HGB 8.2* 7.9* 8.3* 7.7* 7.9*   MCV 96.8 97.2 99.6 96.2 95.9   .0 172.0 179.0 168.0 177.0     Recent Labs   Lab 03/14/25 2141 03/16/25  0355 03/18/25  0106 03/19/25  0741 03/20/25  0650    139 137 142 142   K 4.1 4.0 4.4 4.5 4.1    100 105 106 104   CO2 27.0 30.0 21.0 22.0 28.0   BUN 62* 43* 67* 74* 50*   CREATSERUM 6.01* 5.73* 8.59* 9.38* 7.91*   * 111* 95 111* 101*   CA 9.0 8.7 8.5* 8.8 8.9     Recent Labs   Lab 03/14/25  2141 03/18/25  0106   ALT 65* 66*   AST 60* 41*   ALB 4.0 4.0     Lab Results   Component Value Date    A1C 4.6 03/08/2024    A1C 5.4 08/24/2023    A1C 5.0 04/06/2023     Lab Results   Component Value Date    CHOLEST 200 (H) 03/14/2025    HDL 56 03/14/2025     (H) 03/14/2025    TRIG 211 (H) 03/14/2025        Studies:   Ekg: Sinus.  LVH strain.  Similar to prior tracings (which vary  greatly).  Tele: Stable.  Echo (1/2025):   1. Left ventricle: The cavity size was normal. Wall thickness was moderately      increased. There was concentric hypertrophy. Systolic function was      normal. The estimated ejection fraction was 60-65%, by visual assessment.   2. Left atrium: The atrium was markedly dilated. The left atrial volume was      markedly increased.   3. Right atrium: The atrium was mildly dilated.   4. Mitral valve: A prosthetic device is present. The prosthesis had a normal      range of motion. The sewing ring appeared normal, had no rocking motion,      and showed no evidence of dehiscence. Transvalvular velocity was      increased. The findings were consistent with mild stenosis. There was      moderate regurgitation. The mean diastolic gradient was 6mm Hg at heart      rate of 91 bpm.     Thank you for allowing [facility] to care for your patient. Please contact me with any questions!     Benja Reyes MD  General, Interventional  Structural & Endovascular  Cardiology

## 2025-03-20 NOTE — PLAN OF CARE
Assumed care at 1930. A&OX4. RA. NSR on tele. Renal diet. PIV SL. Heparin for VTE. L Arm precautions d/t fistula. Pt c/o ABD cramping/pain, MD hanson. Tramadol and Bentyl ordered and given per MAR. Up SBA. Safety precautions in place, call light within reach. Will continue with plan of care.      Problem: PAIN - ADULT  Goal: Verbalizes/displays adequate comfort level or patient's stated pain goal  Description: INTERVENTIONS:- Encourage pt to monitor pain and request assistance- Assess pain using appropriate pain scale- Administer analgesics based on type and severity of pain and evaluate response- Implement non-pharmacological measures as appropriate and evaluate response- Consider cultural and social influences on pain and pain management- Manage/alleviate anxiety- Utilize distraction and/or relaxation techniques- Monitor for opioid side effects- Notify MD/LIP if interventions unsuccessful or patient reports new pain- Anticipate increased pain with activity and pre-medicate as appropriate  Outcome: Progressing     Problem: SAFETY ADULT - FALL  Goal: Free from fall injury  Description: INTERVENTIONS:- Assess pt frequently for physical needs- Identify cognitive and physical deficits and behaviors that affect risk of falls.- [place] fall precautions as indicated by assessment.- Educate pt/family on patient safety including physical limitations- Instruct pt to call for assistance with activity based on assessment- Modify environment to reduce risk of injury- Provide assistive devices as appropriate- Consider OT/PT consult to assist with strengthening/mobility- Encourage toileting schedule  Outcome: Progressing

## 2025-03-20 NOTE — PROGRESS NOTES
[facility] Hospitalist Progress Note     Subjective:  No overnight events.  Patient remains on room air.    HD yest  Getting ECHO this am    Review of Systems  Comprehensive ROS reviewed and negative except for what is stated in HPI.      OBJECTIVE:  BP (!) 151/118 (BP Location: Right arm)   Pulse 96   Temp 97.8 °F (36.6 °C) (Oral)   Resp 16   Ht 6' 2\" (1.88 m)   Wt 257 lb (116.6 kg)   SpO2 96%   BMI 33.00 kg/m²   General: No apparent distress.  Alert and oriented.  HEENT:  EOMI, PERRLA.  Pulm: Diminished breath sounds.  Normal respiratory effort.  CV: Regular rate and rhythm, no murmur.   Abd: Soft, nontender, nondistended.   MSK: Full range of motion in extremities, no obvious deformities.    Skin: No lesions or rashes.  Neuro: No obvious focal deficits.      Data Review:    LABS:   Lab Results   Component Value Date    WBC 4.2 03/20/2025    HGB 7.9 03/20/2025    HCT 23.4 03/20/2025    .0 03/20/2025    CREATSERUM 7.91 03/20/2025    BUN 50 03/20/2025     03/20/2025    K 4.1 03/20/2025     03/20/2025    CO2 28.0 03/20/2025     03/20/2025    CA 8.9 03/20/2025       All lab work and imaging personally reviewed.    Assessment/Plan:     Assessment/ Plan:     #Dyspnea  CXR w/ RLL opacity vs effusion  Suspect worsening related to fluid status   -procal reviewed.  Continue rocephin/azithromycin.  -remains on room air  -follow urinary antigens  -nebs PRN     #Rectal bleeding  #Hemorrhoids w/ history of lower GIB   #Chronic anemia  -hemoglobin 8 on presentation- baseline  -evaluated by GI due to persistent complaints--bleeding likely due to hemorrhoids as evident by recent scope.  Discussed with patient option for hemorrhoidectomy and will arrange outpatient follow-up with general surgery.     #Elevated troponin likely 2/2 type II demand  -no chest pain  -cardiology on consult- echo pending      #ESRD  -nephro on consult for MWF HD management     #HTN  -resume home  antihypertensives     #Bipolar  #HFpEF  -resume home meds accordingly      DVT ppx: heparin subcu   Diet: renal     DISPO:  depending clinical status - possible dc later today if cleared by cards, nephro  GI signed off      D/w patient and bedside RN          Ria Gutierrez MD  [facility] Hospitalist  Pager [phone]  Answering Service number: [phone]

## 2025-03-20 NOTE — PLAN OF CARE
Pt Aox4, room air, NSR, VSS  Heparin VTE  Renal diet  Scheduled Benttyl  Up ad osvaldo  L arm fistula  Echo today  Patient eager to discharge home.  Plan of care reviewed    Problem: SAFETY ADULT - FALL  Goal: Free from fall injury  Description: INTERVENTIONS:- Assess pt frequently for physical needs- Identify cognitive and physical deficits and behaviors that affect risk of falls.- [place] fall precautions as indicated by assessment.- Educate pt/family on patient safety including physical limitations- Instruct pt to call for assistance with activity based on assessment- Modify environment to reduce risk of injury- Provide assistive devices as appropriate- Consider OT/PT consult to assist with strengthening/mobility- Encourage toileting schedule  3/20/2025 1433 by Brooke Curry, RN  Outcome: Progressing  3/20/2025 1115 by Brooke Curry, RN  Outcome: Progressing

## 2025-03-20 NOTE — PROGRESS NOTES
[facility]   Division of Cardiology   Consultation Note     Godwin Fonseca Patient Status:  Hospitalized    1980 MRN [id number]   Location [facility] 3NE-A Attending Miguel Garsia,    Hosp Day # 1 PCP Adrian Small MD     Impression:  PNA  Antibiotics per primary team  O2 as needed  Suspect his dry weight is too high and he has a component of volume contributing  BRBPR  Hgb stable in the 8s  Chronic anemia  Recent colonoscopy (Monday) with polyps  GI consulted  Troponinemia  Given ESRD, checking troponin is not helpful outside of the context of symptoms highly suspicious for MI  Would not trend  HTN   Longstanding HTN  On 5 drugs as outpatient  Dyslipidemia  On statin  ESRD  HD dependent  Planning today  Nephrology following  Hx MV repair  Surgery years ago  Echo with moderate MR 2025 and mild to moderate MS (10mmHg gradient at )    Plan:  Antibiotics.  GI consulted -> surgical evaluation for hemorrhoidectomy.  Nephrology following -> HD.  Echo to check MR, pending.  Improving on current therapy.  Anticipate DC in near future.       Chief Complaint: Dyspnea, congestion, cough.  Hematochezia.    Subjective:  No overnight events.  Notes breathing improved post HD today.  No CP.  Still have some chest \"congestion\" he attributes to pneumonia.  Overall better.       Inpatient Medications:    dicyclomine  10 mg Oral TID AC&HS    ARIPiprazole  10 mg Oral Daily    ARIPiprazole  5 mg Oral Daily    atorvastatin  20 mg Oral QPM    buPROPion ER  150 mg Oral Daily    calcium acetate  667 mg Oral TID with meals    cloNIDine  0.1 mg Oral BID    ergocalciferol  50,000 Units Oral Weekly    FLUoxetine HCl  40 mg Oral Daily    gabapentin  200 mg Oral TID    hydrALAZINE  25 mg Oral TID    lamoTRIgine  100 mg Oral Daily    losartan  100 mg Oral Daily    NIFEdipine ER  30 mg Oral Daily    sevelamer carbonate  2,400 mg Oral TID CC    tamsulosin  0.4 mg Oral Daily    [Held  by provider] Lisdexamfetamine Dimesylate  60 mg Oral QAM    heparin  5,000 Units Subcutaneous Q8H MARTHA    cefTRIAXone  2 g Intravenous Q24H    azithromycin  500 mg Oral Daily    pantoprazole  40 mg Intravenous Q12H    polyethylene glycol (PEG 3350)  17 g Oral BID        Vitals:   BP (!) 132/94 (BP Location: Left arm)   Pulse 101   Temp 98.2 °F (36.8 °C) (Oral)   Resp 18   Ht 188 cm (6' 2\")   Wt 257 lb (116.6 kg)   SpO2 92%   BMI 33.00 kg/m²   Wt Readings from Last 3 Encounters:   03/18/25 257 lb (116.6 kg)   03/16/25 241 lb 11.2 oz (109.6 kg)   03/10/25 251 lb 1.6 oz (113.9 kg)       Examination:  General: Well developed, well nourished male.  No distress.  Neck:  No JVD.  Normal ROM.  Cardiac: Regular rate and rhythm.  No murmurs, rubs, or gallops.   Lungs: Clear to ascultation bilaterally.    Abdomen: Soft.  Non-distended.  Non-tender.  Bowel sounds present.    Extremities: Warm, no significant edema.  Palpable pulses.  Neurologic: Alert and oriented.  No gross deficits.  Integument:  No visible rashes identified.  Psych: The patient's affect is appropriate.     Labs:   Recent Labs   Lab 03/14/25 2141 03/16/25 0355 03/18/25 0106 03/19/25  0741   WBC 7.2 6.1 5.0 4.8   HGB 8.2* 7.9* 8.3* 7.7*   MCV 96.8 97.2 99.6 96.2   .0 172.0 179.0 168.0     Recent Labs   Lab 03/14/25 2141 03/16/25 0355 03/18/25 0106 03/19/25  0741    139 137 142   K 4.1 4.0 4.4 4.5    100 105 106   CO2 27.0 30.0 21.0 22.0   BUN 62* 43* 67* 74*   CREATSERUM 6.01* 5.73* 8.59* 9.38*   * 111* 95 111*   CA 9.0 8.7 8.5* 8.8     Recent Labs   Lab 03/14/25 2141 03/18/25  0106   ALT 65* 66*   AST 60* 41*   ALB 4.0 4.0     Lab Results   Component Value Date    A1C 4.6 03/08/2024    A1C 5.4 08/24/2023    A1C 5.0 04/06/2023     Lab Results   Component Value Date    CHOLEST 200 (H) 03/14/2025    HDL 56 03/14/2025     (H) 03/14/2025    TRIG 211 (H) 03/14/2025        Studies:   Ekg: Sinus.  LVH strain.  Similar to  prior tracings (which vary greatly).  Tele: Stable.  Echo (1/2025):   1. Left ventricle: The cavity size was normal. Wall thickness was moderately increased. There was concentric hypertrophy. Systolic function was normal. The estimated ejection fraction was 60-65%, by visual assessment.   2. Left atrium: The left atrial volume was markedly increased.   3. Right atrium: The atrium was mildly dilated.   4. Mitral valve: A prosthetic device is present. The prosthesis had a normal range of motion. The sewing ring appeared normal, had no rocking motion, and showed no evidence of dehiscence. Leaflet separation was mildly      reduced. Transvalvular velocity was increased. The findings were consistent with mild stenosis. There was moderate regurgitation. The mean diastolic gradient was 10mm Hg at a heart rate 108 bpm.     Thank you for allowing [facility] to care for your patient. Please contact me with any questions!     Benja Reyes MD  General, Interventional  Structural & Endovascular  Cardiology

## 2025-03-20 NOTE — PROGRESS NOTES
NURSING DISCHARGE NOTE    Discharge AVS reviewed with patient including new medication prescriptions.  Peripheral IV and tele box removed  All patient belongings sent home with the patient  He was taken to the [facility] via wheelchair where his uber was waiting to drive him to his residence

## 2025-03-20 NOTE — PROGRESS NOTES
[facility] - Nephrology  Inpatient Follow-up    Godwin Fonseca Patient Status:  Observation    1980 MRN [id number]   [facility] 3NE-A Attending Miguel Garsia,    Hosp Day # 0 PCP Adrian Small MD       SUBJECTIVE:     Patient seen and examined at bedside.     MEDICATIONS:     Current Facility-Administered Medications   Medication Dose Route Frequency    traMADol (Ultram) tab 50 mg  50 mg Oral Q12H PRN    dicyclomine (Bentyl) cap 10 mg  10 mg Oral TID AC&HS    albuterol (Ventolin HFA) 108 (90 Base) MCG/ACT inhaler 2 puff  2 puff Inhalation Q6H PRN    ARIPiprazole (Abilify) tab 10 mg  10 mg Oral Daily    ARIPiprazole (Abilify) tab 5 mg  5 mg Oral Daily    atorvastatin (Lipitor) tab 20 mg  20 mg Oral QPM    buPROPion ER (Wellbutrin XL) 24 hr tab 150 mg  150 mg Oral Daily    calcium acetate (Phoslo) cap 667 mg  667 mg Oral TID with meals    cloNIDine (Catapres) tab 0.1 mg  0.1 mg Oral BID    ergocalciferol (Vitamin D2) cap 50,000 Units  50,000 Units Oral Weekly    FLUoxetine (PROzac) cap 40 mg  40 mg Oral Daily    gabapentin (Neurontin) cap 200 mg  200 mg Oral TID    hydrALAZINE (Apresoline) tab 25 mg  25 mg Oral TID    lamoTRIgine (LaMICtal) tab 100 mg  100 mg Oral Daily    losartan (Cozaar) tab 100 mg  100 mg Oral Daily    NIFEdipine ER (Procardia-XL) 24 hr tab 30 mg  30 mg Oral Daily    sevelamer carbonate (Renvela) tab 2,400 mg  2,400 mg Oral TID CC    tamsulosin (Flomax) cap 0.4 mg  0.4 mg Oral Daily    [Held by provider] lisdexamfetamine (Vyvanse) cap 60 mg  60 mg Oral QAM    heparin (Porcine) 5000 UNIT/ML injection 5,000 Units  5,000 Units Subcutaneous Q8H MARTHA    acetaminophen (Tylenol Extra Strength) tab 500 mg  500 mg Oral Q4H PRN    acetaminophen (Tylenol) tab 650 mg  650 mg Oral Q4H PRN    Or    HYDROcodone-acetaminophen (Norco) 5-325 MG per tab 1 tablet  1 tablet Oral Q4H PRN    Or    HYDROcodone-acetaminophen (Norco) 5-325 MG per tab 2 tablet  2 tablet Oral Q4H PRN     polyethylene glycol (PEG 3350) (Miralax) 17 g oral packet 17 g  17 g Oral Daily PRN    sennosides (Senokot) tab 17.2 mg  17.2 mg Oral Nightly PRN    bisacodyl (Dulcolax) 10 MG rectal suppository 10 mg  10 mg Rectal Daily PRN    ondansetron (Zofran) 4 MG/2ML injection 4 mg  4 mg Intravenous Q6H PRN    prochlorperazine (Compazine) 10 MG/2ML injection 5 mg  5 mg Intravenous Q8H PRN    benzonatate (Tessalon) cap 200 mg  200 mg Oral TID PRN    guaiFENesin (Robitussin) 100 MG/5 ML oral liquid 200 mg  200 mg Oral Q4H PRN    cefTRIAXone (Rocephin) 2 g in sodium chloride 0.9% 100 mL IVPB-ADDV  2 g Intravenous Q24H    azithromycin (Zithromax) tab 500 mg  500 mg Oral Daily    ondansetron (Zofran) 4 MG/2ML injection 4 mg  4 mg Intravenous Q6H PRN    pantoprazole (Protonix) 40 mg in sodium chloride 0.9% PF 10 mL IV push  40 mg Intravenous Q12H    ipratropium-albuterol (Duoneb) 0.5-2.5 (3) MG/3ML inhalation solution 3 mL  3 mL Nebulization Q4H PRN    heparin (Porcine) 1000 UNIT/ML injection 1,500 Units  1.5 mL Intracatheter PRN Dialysis    polyethylene glycol (PEG 3350) (Miralax) 17 g oral packet 17 g  17 g Oral BID       PHYSICAL EXAM:     Vital Signs: BP (!) 148/109 (BP Location: Right arm)   Pulse 96   Temp 98 °F (36.7 °C) (Oral)   Resp 15   Ht 6' 2\" (1.88 m)   Wt 257 lb (116.6 kg)   SpO2 98%   BMI 33.00 kg/m²   Temp (24hrs), Av °F (36.7 °C), Min:97.8 °F (36.6 °C), Max:98.2 °F (36.8 °C)     No intake or output data in the 24 hours ending 25 1349    Wt Readings from Last 3 Encounters:   25 257 lb (116.6 kg)   25 241 lb 11.2 oz (109.6 kg)   03/10/25 251 lb 1.6 oz (113.9 kg)       General: no acute distress  HEENT: normocephalic, atraumatic  CV: RRR  Respiratory: no distress  Skin: warm, dry  Neuro: awake, alert     LABORATORY DATA:     Lab Results   Component Value Date     (H) 2025    BUN 50 (H) 2025    BUNCREA 13.0 2022    CREATSERUM 7.91 (H) 2025    ANIONGAP 10  03/20/2025    GFR 59 (L) 01/04/2018    GFRNAA 30 (L) 07/12/2022    GFRAA 35 (L) 07/12/2022    CA 8.9 03/20/2025    OSMOCALC 307 (H) 03/20/2025    ALKPHO 94 03/18/2025    AST 41 (H) 03/18/2025    ALT 66 (H) 03/18/2025    BILT 0.3 03/18/2025    TP 7.3 03/18/2025    ALB 4.0 03/18/2025    GLOBULIN 3.3 03/18/2025     03/20/2025    K 4.1 03/20/2025     03/20/2025    CO2 28.0 03/20/2025     Lab Results   Component Value Date    WBC 4.2 03/20/2025    RBC 2.44 (L) 03/20/2025    HGB 7.9 (L) 03/20/2025    HCT 23.4 (L) 03/20/2025    .0 03/20/2025    MPV 11.5 12/14/2012    MCV 95.9 03/20/2025    MCH 32.4 03/20/2025    MCHC 33.8 03/20/2025    RDW 14.1 03/20/2025    NEPRELIM 2.71 03/18/2025    NEPERCENT 54.8 03/18/2025    LYPERCENT 22.2 03/18/2025    MOPERCENT 14.7 03/18/2025    EOPERCENT 6.7 03/18/2025    BAPERCENT 1.4 03/18/2025    NE 2.71 03/18/2025    LYMABS 1.10 03/18/2025    MOABSO 0.73 03/18/2025    EOABSO 0.33 03/18/2025    BAABSO 0.07 03/18/2025     Lab Results   Component Value Date    MALBP 167.00 05/24/2023    CREUR 142.00 05/24/2023    CREUR 142.00 05/24/2023     Lab Results   Component Value Date    COLORUR Light-Yellow 11/17/2024    CLARITY Clear 11/17/2024    SPECGRAVITY 1.013 11/17/2024    GLUUR Normal 11/17/2024    BILUR Negative 11/17/2024    KETUR Negative 11/17/2024    BLOODURINE Negative 11/17/2024    PHURINE 8.5 (H) 11/17/2024    PROUR 100 (A) 11/17/2024    UROBILINOGEN Normal 11/17/2024    NITRITE Negative 11/17/2024    LEUUR Negative 11/17/2024    WBCUR 1-5 11/17/2024    RBCUR 0-2 11/17/2024    EPIUR Few (A) 11/17/2024    BACUR Rare (A) 11/17/2024    CAOXUR Occasional (A) 04/20/2018    HYLUR Present (A) 05/14/2019       IMAGING:     Reviewed    ASSESSMENT:      # ESRD on HD  -MWF schedule outpatient  -[facility] - planning to transfer to [facility]     # HTN/Volume Status  -Home medications: carvedilol, hydralazine, losartan, nifedipine, clonidine     # Anemia     #  Secondary Hyperparathyroidism     PLAN:      -HD per MWF schedule  -UF with HD as tolerated  -Continue home BP medications   -Defer OWEN in setting of high BPs  -Anemia management per primary  -Continue sevelamer   -Avoid nephrotoxins and renally dose medications for iHD 3x/weekly  -Monitor intake and output daily  -Daily weights        We will continue to follow.    Thank you for allowing us to participate in the care of this patient.       DO Kevin Hallman [facility] - Nephrology       Medical

## 2025-03-21 ENCOUNTER — PATIENT OUTREACH (OUTPATIENT)
Dept: CASE MANAGEMENT | Age: 45
End: 2025-03-21

## 2025-03-21 NOTE — PROGRESS NOTES
Discharged 3/20 [facility]      PCP Request :  Schedule an appointment with Adrian Small as soon as possible for a visit Specialty: Internal Medicine [address] [address] [phone]  Tuesday 3/25 @10am w/ Dr. Adrian Arce (existing appt)      Spoke with pt who confirmed and existing PCP HFU appt.      No further assistance needed        Closing encounter

## 2025-03-25 NOTE — DISCHARGE SUMMARY
ADIS  HOSPITALIST  DISCHARGE SUMMARY     Godwin Fonseca Patient Status:  Observation    1980 MRN [id number]   Location [facility] 3NE-A Attending No att. providers found   Hosp Day # 0 PCP Adrian Small MD     Date of Admission: 3/18/2025  Date of Discharge: 3/20/2025  Discharge Disposition: Home or Self Care    Discharge Diagnosis:   #Dyspnea  #Rectal bleeding  #Hemorrhoids w/ history of lower GIB   #Chronic anemia  #Elevated troponin likely 2/2 type II demand  #ESRD  #HTN  #Bipolar  #HFpEF       History of Present Illness:   Patient is a 44 year old male with ESRD on MWF HD, hemorrhoids, diverticulosis, hypertension and chronic anemia presented for dyspnea that began yesterday evening.  Patient with recent admission for rectal bleeding s/p colonoscopy found to be hemorrhoid related etiology and was discharged after dialysis session.  Patient reports he has been compliant with dialysis.  No fevers or chills. Notes on going rectal bleeding, reports most of the stools have been dark in nature.   In the ER, hypertensive, saturating well on room air.  Labs with initial troponin 237 and subsequent to 45.  proBNP elevated at 6357.  Lactate WNL.  Hemoglobin 8.3 (stable from his recent discharge) and BMP consistent with ESRD.  CXR with right lower lobe opacity versus evolving effusion.  Patient admitted for further monitoring.    Brief Synopsis:     #Dyspnea  CXR w/ RLL opacity vs effusion  Suspect worsening related to fluid status   -procal reviewed.  Continue rocephin/azithromycin.  -remains on room air  -follow urinary antigens  -nebs PRN     #Rectal bleeding  #Hemorrhoids w/ history of lower GIB   #Chronic anemia  -hemoglobin 8 on presentation- baseline  -evaluated by GI due to persistent complaints--bleeding likely due to hemorrhoids as evident by recent scope.  Discussed with patient option for hemorrhoidectomy and will arrange outpatient follow-up with general surgery.     #Elevated troponin likely 2/2  type II demand  -no chest pain  -cardiology on consult- echo pending      #ESRD  -nephro on consult for MWF HD management     #HTN  -resume home antihypertensives     #Bipolar  #HFpEF  -resume home meds accordingly      DC INSTRUCTIONS  dc later today if cleared by ernie, nephro  GI signed off        D/w patient and bedside RN              Lace+ Score: 74  59-90 High Risk  29-58 Medium Risk  0-28   Low Risk       TCM Follow-Up Recommendation:  LACE > 58: High Risk of readmission after discharge from the hospital.    Procedures during hospitalization:   none    Incidental or significant findings and recommendations (brief descriptions):  none    Lab/Test results pending at Discharge:   none    Consultants:  Ernie  Nephro  GI    Discharge Medication List:     Discharge Medications        START taking these medications        Instructions Prescription details   amoxicillin clavulanate 500-125 MG Tabs  Commonly known as: Augmentin      Take 1 tablet by mouth daily with food for 7 days.   Stop taking on: March 27, 2025  Quantity: 7 tablet  Refills: 0     benzonatate 200 MG Caps  Commonly known as: Tessalon      Take 1 capsule (200 mg total) by mouth 3 (three) times daily as needed for cough.   Quantity: 20 capsule  Refills: 0            CONTINUE taking these medications        Instructions Prescription details   albuterol 108 (90 Base) MCG/ACT Aers  Commonly known as: Ventolin HFA      Inhale 2 puffs into the lungs every 6 (six) hours as needed for Wheezing.   Refills: 0     ARIPiprazole 5 MG Tabs  Commonly known as: Abilify      Take 1 tablet (5 mg total) by mouth daily. Take 5mg (1 tablet) by mouth daily. Take each dose with 1 tablet of 10mg aripiprazole for a total daily dose of 15mg aripiprazole.   Refills: 0     ARIPiprazole 10 MG Tabs  Commonly known as: Abilify      Take 1 tablet (10 mg total) by mouth daily. Take 10mg (1 tablet) by mouth daily. Take each dose with 1 tablet of 5mg aripiprazole for a total daily  dose of 15mg aripiprazole.   Refills: 0     atorvastatin 20 MG Tabs  Commonly known as: Lipitor      Take 1 tablet (20 mg total) by mouth every evening.   Refills: 0     buPROPion  MG Tb24  Commonly known as: Wellbutrin XL      Take 1 tablet (150 mg total) by mouth daily.   Refills: 0     calcium acetate 667 MG Caps  Commonly known as: Phoslo      Take 1 capsule (667 mg total) by mouth with breakfast, with lunch, and with evening meal.   Quantity: 90 capsule  Refills: 0     carvedilol 25 MG Tabs  Commonly known as: Coreg      Take 1 tablet (25 mg total) by mouth in the morning and 1 tablet (25 mg total) in the evening. Take with meals.   Quantity: 60 tablet  Refills: 6     cloNIDine 0.1 MG Tabs  Commonly known as: Catapres      Take 1 tablet (0.1 mg total) by mouth 2 (two) times daily.   Quantity: 60 tablet  Refills: 0     ergocalciferol 1.25 MG (98476 UT) Caps  Commonly known as: Vitamin D2      Take 1 capsule (50,000 Units total) by mouth once a week.   Refills: 0     FLUoxetine HCl 40 MG Caps  Commonly known as: PROZAC      Take 1 capsule (40 mg total) by mouth daily.   Quantity: 7 capsule  Refills: 0     fluticasone propionate 50 MCG/ACT Susp  Commonly known as: Flonase      SPRAY ONCE INTO EACH NOSTRIL BID PRN   Quantity: 15.8 mL  Refills: 0     gabapentin 100 MG Caps  Commonly known as: Neurontin      Take 2 capsules (200 mg total) by mouth 3 (three) times daily.   Quantity: 180 capsule  Refills: 0     hydrALAZINE 25 MG Tabs  Commonly known as: Apresoline      Take 1 tablet (25 mg total) by mouth 3 (three) times daily.   Refills: 0     HYDROcodone-acetaminophen  MG Tabs  Commonly known as: Norco      Take 1 tablet by mouth every 6 (six) hours as needed for Pain.   Refills: 0     lamoTRIgine 100 MG Tabs  Commonly known as: LaMICtal      Take 1 tablet (100 mg total) by mouth daily.   Refills: 0     losartan 100 MG Tabs  Commonly known as: Cozaar      Take 1 tablet (100 mg total) by mouth daily.    Refills: 0     NIFEdipine ER 30 MG Tb24  Commonly known as: Adalat CC      Take 1 tablet (30 mg total) by mouth daily.   Quantity: 30 tablet  Refills: 0     ondansetron 4 MG Tbdp  Commonly known as: Zofran-ODT      Take 1 tablet (4 mg total) by mouth every 8 (eight) hours as needed for Nausea.   Refills: 0     Polyethylene Glycol 3350 17 g Pack  Commonly known as: MIRALAX      Take 17 g by mouth daily as needed.   Refills: 0     Preparation H 0.25 % Supp  Generic drug: Phenylephrine HCl      Place 1 suppository rectally 4 (four) times daily as needed (Hemorrhoids after bowel movements).   Quantity: 24 suppository  Refills: 0     sevelamer carbonate 800 MG Tabs  Commonly known as: Renvela      Take 3 tablets (2,400 mg total) by mouth 3 (three) times daily with meals.   Quantity: 270 tablet  Refills: 0     tamsulosin 0.4 MG Caps  Commonly known as: Flomax      Take 1 capsule (0.4 mg total) by mouth daily.   Refills: 0     Vyvanse 60 MG Caps  Generic drug: Lisdexamfetamine Dimesylate      Take 1 capsule (60 mg total) by mouth every morning.   Refills: 0               Where to Get Your Medications        These medications were sent to [id number] - [place], [place] - [address] [phone], [phone]  [address] [place] [address]      Phone: [phone]   amoxicillin clavulanate 500-125 MG Tabs  benzonatate 200 MG Caps         [place]   [place]    Follow-up appointment:   Adrian Arce MD  [address] [facility] [address] [phone]    Schedule an appointment as soon as possible for a visit      Appointments for Next 30 Days 3/25/2025 - 4/24/2025        Date Arrival Time Visit Type Length Department Provider     4/10/2025  9:15 AM  [facility] MRA HEAD WWO [1412] 45 min [facility] E[facility] MR RM3 (3T WIDE)    Patient Instructions:     You may be subject to a fee if you do not show up for your appointment or you cancel within 24 hours of your appointment.    Please arrive 30 minutes prior to your  scheduled appointment time.  You will need time to change your clothes, fill out screening forms, use the restroom, and may need an IV if your exam requires contrast.  If you arrive too late, your appointment may need to be rescheduled.    Please do not wear any form of eye make-up to your MRI appointment. It can cause artifacts on the images and potentially obscure vital information.  You will be required to remove any eye make-up at your appointment.  You can eat, drink, and take your medication(s).     IF YOU REQUIRE ORAL SEDATION FOR YOUR MRI: Your physician is responsible for giving you a prescription for oral medication which you would fill at your local pharmacy. If you will be taking oral sedation, you must bring a  who will drive you home (the  does not necessarily have to stay throughout the procedure).        Location Instructions:     You may be subject to a fee if you do not show up for your appointment or you cancel within 24 hours of your appointment.  Your appointment will be at [facility] located at [address] [place] [address]. To reach Outpatient Registration, you may park or  in the South Parking Garage. Enter the double doors located on the ground floor and proceed to the lobby past the Information Desk to Outpatient Registration.&nbsp; The phone number for this location is [phone].  Because of the nature of the Emergency Department, please be advised of the possibility your appointment may be delayed at this location.  Due to safety reasons you will be required to change into a gown. You will be asked to remove all metallic items, such as watches, jewelry, hairpins, eyeglasses, hearing aids, and continuous glucose monitoring devices. Your purse, wallet or other personal items will remain in a secure locker or with your family during your exam.  Please bring your insurance card and photo ID. You will also need to bring your doctor's order unless your  physician's office submitted the order electronically or faxed the order. Without the order, your test may be delayed or postponed.  Children: Children under the age of 12 must have another adult caregiver with them.&nbsp; Please do not bring your child/children without a caregiver.&nbsp; Because of the highly sensitive equipment and privacy of all our patients, children will not be permitted in the exam rooms, unless otherwise noted and in accordance with departmental policy.  PATIENT RESPONSIBILITY ESTIMATE  - (Estimate) We will provide you with your estimated remaining deductible and coinsurance balance for your services at the time of check in.  - (Payment) Please be aware that you may be asked for payment at the time of service.  Masks are optional for all patients and visitors, unless otherwise indicated.                      Vital signs:     OBJECTIVE:  BP (!) 151/118 (BP Location: Right arm)   Pulse 96   Temp 97.8 °F (36.6 °C) (Oral)   Resp 16   Ht 6' 2\" (1.88 m)   Wt 257 lb (116.6 kg)   SpO2 96%   BMI 33.00 kg/m²   General: No apparent distress.  Alert and oriented.  HEENT:  EOMI, PERRLA.  Pulm: Diminished breath sounds.  Normal respiratory effort.  CV: Regular rate and rhythm, no murmur.   Abd: Soft, nontender, nondistended.   MSK: Full range of motion in extremities, no obvious deformities.    Skin: No lesions or rashes.  Neuro: No obvious focal deficits.  -----------------------------------------------------------------------------------------------  PATIENT DISCHARGE INSTRUCTIONS: See electronic chart    Ria Gutierrez MD    Time spent:  > 30 minutes

## 2025-03-25 NOTE — PAYOR COMM NOTE
--------------  DISCHARGE REVIEW    Payor: [employer]  Subscriber #:  [id number]  Authorization Number: [id number]    Admit date: N/A  Admit time:  N/A  Discharge Date: 3/20/2025  6:53 PM     Admitting Physician: Thomas Contreras DO  Attending Physician:  No att. providers found  Primary Care Physician: Adrian Arce MD       3/18/25 0518  Place in observation Once  Once     Completed     Service: Medical  Level of Care: Acute  Patient Class: Observation   Question: Diagnosis Answer: Shortness of breath    25       DULY  HOSPITALIST  DISCHARGE SUMMARY     Godwin Fonseca Patient Status:  Observation    1980 MRN [id number]   Location [facility] 3NE-A Attending No att. providers found   Hosp Day # 0 PCP Adrian Small MD     Date of Admission: 3/18/2025  Date of Discharge: 3/20/2025  Discharge Disposition: Home or Self Care    Discharge Diagnosis:   #Dyspnea  #Rectal bleeding  #Hemorrhoids w/ history of lower GIB   #Chronic anemia  #Elevated troponin likely 2/2 type II demand  #ESRD  #HTN  #Bipolar  #HFpEF       History of Present Illness:   Patient is a 44 year old male with ESRD on MWF HD, hemorrhoids, diverticulosis, hypertension and chronic anemia presented for dyspnea that began yesterday evening.  Patient with recent admission for rectal bleeding s/p colonoscopy found to be hemorrhoid related etiology and was discharged after dialysis session.  Patient reports he has been compliant with dialysis.  No fevers or chills. Notes on going rectal bleeding, reports most of the stools have been dark in nature.   In the ER, hypertensive, saturating well on room air.  Labs with initial troponin 237 and subsequent to 45.  proBNP elevated at 6357.  Lactate WNL.  Hemoglobin 8.3 (stable from his recent discharge) and BMP consistent with ESRD.  CXR with right lower lobe opacity versus evolving effusion.  Patient admitted for further monitoring.    Brief Synopsis:     #Dyspnea  CXR w/ RLL  opacity vs effusion  Suspect worsening related to fluid status   -procal reviewed.  Continue rocephin/azithromycin.  -remains on room air  -follow urinary antigens  -nebs PRN     #Rectal bleeding  #Hemorrhoids w/ history of lower GIB   #Chronic anemia  -hemoglobin 8 on presentation- baseline  -evaluated by GI due to persistent complaints--bleeding likely due to hemorrhoids as evident by recent scope.  Discussed with patient option for hemorrhoidectomy and will arrange outpatient follow-up with general surgery.     #Elevated troponin likely 2/2 type II demand  -no chest pain  -cardiology on consult- echo pending      #ESRD  -nephro on consult for MWF HD management     #HTN  -resume home antihypertensives     #Bipolar  #HFpEF  -resume home meds accordingly      DC INSTRUCTIONS  dc later today if cleared by ernie nephro  GI signed off        D/w patient and bedside RN              Lace+ Score: 74  59-90 High Risk  29-58 Medium Risk  0-28   Low Risk       TCM Follow-Up Recommendation:  LACE > 58: High Risk of readmission after discharge from the hospital.    Procedures during hospitalization:   none    Incidental or significant findings and recommendations (brief descriptions):  none    Lab/Test results pending at Discharge:   none    Consultants:  Ernie  Nephro  GI    Discharge Medication List:     Discharge Medications        START taking these medications        Instructions Prescription details   amoxicillin clavulanate 500-125 MG Tabs  Commonly known as: Augmentin      Take 1 tablet by mouth daily with food for 7 days.   Stop taking on: March 27, 2025  Quantity: 7 tablet  Refills: 0     benzonatate 200 MG Caps  Commonly known as: Tessalon      Take 1 capsule (200 mg total) by mouth 3 (three) times daily as needed for cough.   Quantity: 20 capsule  Refills: 0            CONTINUE taking these medications        Instructions Prescription details   albuterol 108 (90 Base) MCG/ACT Aers  Commonly known as: Ventolin  HFA      Inhale 2 puffs into the lungs every 6 (six) hours as needed for Wheezing.   Refills: 0     ARIPiprazole 5 MG Tabs  Commonly known as: Abilify      Take 1 tablet (5 mg total) by mouth daily. Take 5mg (1 tablet) by mouth daily. Take each dose with 1 tablet of 10mg aripiprazole for a total daily dose of 15mg aripiprazole.   Refills: 0     ARIPiprazole 10 MG Tabs  Commonly known as: Abilify      Take 1 tablet (10 mg total) by mouth daily. Take 10mg (1 tablet) by mouth daily. Take each dose with 1 tablet of 5mg aripiprazole for a total daily dose of 15mg aripiprazole.   Refills: 0     atorvastatin 20 MG Tabs  Commonly known as: Lipitor      Take 1 tablet (20 mg total) by mouth every evening.   Refills: 0     buPROPion  MG Tb24  Commonly known as: Wellbutrin XL      Take 1 tablet (150 mg total) by mouth daily.   Refills: 0     calcium acetate 667 MG Caps  Commonly known as: Phoslo      Take 1 capsule (667 mg total) by mouth with breakfast, with lunch, and with evening meal.   Quantity: 90 capsule  Refills: 0     carvedilol 25 MG Tabs  Commonly known as: Coreg      Take 1 tablet (25 mg total) by mouth in the morning and 1 tablet (25 mg total) in the evening. Take with meals.   Quantity: 60 tablet  Refills: 6     cloNIDine 0.1 MG Tabs  Commonly known as: Catapres      Take 1 tablet (0.1 mg total) by mouth 2 (two) times daily.   Quantity: 60 tablet  Refills: 0     ergocalciferol 1.25 MG (64836 UT) Caps  Commonly known as: Vitamin D2      Take 1 capsule (50,000 Units total) by mouth once a week.   Refills: 0     FLUoxetine HCl 40 MG Caps  Commonly known as: PROZAC      Take 1 capsule (40 mg total) by mouth daily.   Quantity: 7 capsule  Refills: 0     fluticasone propionate 50 MCG/ACT Susp  Commonly known as: Flonase      SPRAY ONCE INTO EACH NOSTRIL BID PRN   Quantity: 15.8 mL  Refills: 0     gabapentin 100 MG Caps  Commonly known as: Neurontin      Take 2 capsules (200 mg total) by mouth 3 (three) times  daily.   Quantity: 180 capsule  Refills: 0     hydrALAZINE 25 MG Tabs  Commonly known as: Apresoline      Take 1 tablet (25 mg total) by mouth 3 (three) times daily.   Refills: 0     HYDROcodone-acetaminophen  MG Tabs  Commonly known as: Norco      Take 1 tablet by mouth every 6 (six) hours as needed for Pain.   Refills: 0     lamoTRIgine 100 MG Tabs  Commonly known as: LaMICtal      Take 1 tablet (100 mg total) by mouth daily.   Refills: 0     losartan 100 MG Tabs  Commonly known as: Cozaar      Take 1 tablet (100 mg total) by mouth daily.   Refills: 0     NIFEdipine ER 30 MG Tb24  Commonly known as: Adalat CC      Take 1 tablet (30 mg total) by mouth daily.   Quantity: 30 tablet  Refills: 0     ondansetron 4 MG Tbdp  Commonly known as: Zofran-ODT      Take 1 tablet (4 mg total) by mouth every 8 (eight) hours as needed for Nausea.   Refills: 0     Polyethylene Glycol 3350 17 g Pack  Commonly known as: MIRALAX      Take 17 g by mouth daily as needed.   Refills: 0     Preparation H 0.25 % Supp  Generic drug: Phenylephrine HCl      Place 1 suppository rectally 4 (four) times daily as needed (Hemorrhoids after bowel movements).   Quantity: 24 suppository  Refills: 0     sevelamer carbonate 800 MG Tabs  Commonly known as: Renvela      Take 3 tablets (2,400 mg total) by mouth 3 (three) times daily with meals.   Quantity: 270 tablet  Refills: 0     tamsulosin 0.4 MG Caps  Commonly known as: Flomax      Take 1 capsule (0.4 mg total) by mouth daily.   Refills: 0     Vyvanse 60 MG Caps  Generic drug: Lisdexamfetamine Dimesylate      Take 1 capsule (60 mg total) by mouth every morning.   Refills: 0               Where to Get Your Medications        These medications were sent to [id number] - [place], [place] - [address] [phone], [phone]  [address] [address]      Phone: [phone]   amoxicillin clavulanate 500-125 MG Tabs  benzonatate 200 MG Caps         [place]    yes    Follow-up appointment:   Adrian Arce MD  [address] [address]  [phone]    Schedule an appointment as soon as possible for a visit      Appointments for Next 30 Days 3/25/2025 - 4/24/2025        Date Arrival Time Visit Type Length Department Provider     4/10/2025  9:15 AM  Critical access hospital [facility]WO [1412] 45 min [facility] MRI EH MR RM3 (3T WIDE)    Patient Instructions:     You may be subject to a fee if you do not show up for your appointment or you cancel within 24 hours of your appointment.    Please arrive 30 minutes prior to your scheduled appointment time.  You will need time to change your clothes, fill out screening forms, use the restroom, and may need an IV if your exam requires contrast.  If you arrive too late, your appointment may need to be rescheduled.    Please do not wear any form of eye make-up to your MRI appointment. It can cause artifacts on the images and potentially obscure vital information.  You will be required to remove any eye make-up at your appointment.  You can eat, drink, and take your medication(s).     IF YOU REQUIRE ORAL SEDATION FOR YOUR MRI: Your physician is responsible for giving you a prescription for oral medication which you would fill at your local pharmacy. If you will be taking oral sedation, you must bring a  who will drive you home (the  does not necessarily have to stay throughout the procedure).        Location Instructions:     You may be subject to a fee if you do not show up for your appointment or you cancel within 24 hours of your appointment.  Your appointment will be at [facility] located at [address] [place] [id number]. To reach Outpatient Registration, you may park or  in the South Parking Garage. Enter the double doors located on the ground floor and proceed to the lobby past the Information Desk to Outpatient Registration.&nbsp; The phone number for this location is [phone].  Because of  the nature of the Emergency Department, please be advised of the possibility your appointment may be delayed at this location.  Due to safety reasons you will be required to change into a gown. You will be asked to remove all metallic items, such as watches, jewelry, hairpins, eyeglasses, hearing aids, and continuous glucose monitoring devices. Your purse, wallet or other personal items will remain in a secure locker or with your family during your exam.  Please bring your insurance card and photo ID. You will also need to bring your doctor's order unless your physician's office submitted the order electronically or faxed the order. Without the order, your test may be delayed or postponed.  Children: Children under the age of 12 must have another adult caregiver with them.&nbsp; Please do not bring your child/children without a caregiver.&nbsp; Because of the highly sensitive equipment and privacy of all our patients, children will not be permitted in the exam rooms, unless otherwise noted and in accordance with departmental policy.  PATIENT RESPONSIBILITY ESTIMATE  - (Estimate) We will provide you with your estimated remaining deductible and coinsurance balance for your services at the time of check in.  - (Payment) Please be aware that you may be asked for payment at the time of service.  Masks are optional for all patients and visitors, unless otherwise indicated.                      Vital signs:     OBJECTIVE:  BP (!) 151/118 (BP Location: Right arm)   Pulse 96   Temp 97.8 °F (36.6 °C) (Oral)   Resp 16   Ht 6' 2\" (1.88 m)   Wt 257 lb (116.6 kg)   SpO2 96%   BMI 33.00 kg/m²   General: No apparent distress.  Alert and oriented.  HEENT:  EOMI, PERRLA.  Pulm: Diminished breath sounds.  Normal respiratory effort.  CV: Regular rate and rhythm, no murmur.   Abd: Soft, nontender, nondistended.   MSK: Full range of motion in extremities, no obvious deformities.    Skin: No lesions or rashes.  Neuro: No obvious  focal deficits.  -----------------------------------------------------------------------------------------------  PATIENT DISCHARGE INSTRUCTIONS: See electronic chart    Ria Gutierrez MD    Time spent:  > 30 minutes     Electronically signed by Ria Gutierrez MD on 3/25/2025 12:22 AM         REVIEWER COMMENTS    3/18 Nephrology     Godwin Fonseca is a 44 year old male with a medical history notable for ESRD on HD MWF who presents with shortness of breath. Last dialysis session on Monday 3/17 per his normal schedule. No missed sessions recently. CXR on admission with concern for pneumonia. Admitted for further workup. Nephrology consulted for ESRD management with HD.     # ESRD on HD  -MWF schedule outpatient  -[facility] - planning to [facility]     # HTN/Volume Status  -Home medications: carvedilol, hydralazine, losartan, nifedipine, clonidine     # Anemia     # Secondary Hyperparathyroidism     PLAN:      -HD per F schedule  -UF with HD as tolerated  -Continue home BP medications   -Defer OWEN in setting of high BPs  -Anemia management per primary - hemoglobin lower than baseline.  -Continue sevelamer   -Avoid nephrotoxins and renally dose medications for creatinine clearance  -Monitor intake and output daily  -Daily weights    3/19 EP  PNA  Antibiotics per primary team  O2 as needed  Suspect his dry weight is too high and he has a component of volume contributing  BRBPR  Hgb stable in the 8s  Chronic anemia  Recent colonoscopy (Monday) with polyps  GI consulted  Troponinemia  Given ESRD, checking troponin is not helpful outside of the context of symptoms highly suspicious for MI  Would not trend  HTN   Longstanding HTN  On 5 drugs as outpatient  Dyslipidemia  On statin  ESRD  HD dependent  Planning today  Nephrology following  Hx MV repair  Surgery years ago  Echo with moderate MR 1/2025 and mild to moderate MS (10mmHg gradient at )     Plan:  Antibiotics.  GI consulted ->  surgical evaluation for hemorrhoidectomy.  Nephrology following -> HD.  Echo to check MR, pending.  Improving on current therapy.  Anticipate DC in near future.      3/20 EP    Ok to DC.  FU with Cardiology; has outpatient cardiologist.   Echo stable with moderate MR.  FU with surgery re: hemorrhoids.  HD per nephrology.  Antibiotics per primary service.

## 2025-03-26 ENCOUNTER — HOSPITAL ENCOUNTER (OUTPATIENT)
Facility: HOSPITAL | Age: 45
Setting detail: OBSERVATION
Discharge: HOME | End: 2025-03-27
Attending: EMERGENCY MEDICINE | Admitting: STUDENT IN AN ORGANIZED HEALTH CARE EDUCATION/TRAINING PROGRAM
Payer: MEDICARE

## 2025-03-26 ENCOUNTER — APPOINTMENT (OUTPATIENT)
Dept: CT IMAGING | Age: 45
End: 2025-03-26
Attending: EMERGENCY MEDICINE
Payer: MEDICARE

## 2025-03-26 ENCOUNTER — APPOINTMENT (OUTPATIENT)
Dept: GENERAL RADIOLOGY | Age: 45
End: 2025-03-26
Attending: EMERGENCY MEDICINE
Payer: MEDICARE

## 2025-03-26 DIAGNOSIS — R55 SYNCOPE, VASOVAGAL: Primary | ICD-10-CM

## 2025-03-26 DIAGNOSIS — I10 HYPERTENSION, UNSPECIFIED TYPE: ICD-10-CM

## 2025-03-26 DIAGNOSIS — R06.02 SOB (SHORTNESS OF BREATH): ICD-10-CM

## 2025-03-26 DIAGNOSIS — Z91.158 NON-COMPLIANCE WITH RENAL DIALYSIS: ICD-10-CM

## 2025-03-26 DIAGNOSIS — K62.5 RECTAL BLEEDING: ICD-10-CM

## 2025-03-26 LAB
ALBUMIN SERPL-MCNC: 3.8 G/DL (ref 3.2–4.8)
ALBUMIN/GLOB SERPL: 1.2 {RATIO} (ref 1–2)
ALP LIVER SERPL-CCNC: 108 U/L
ALT SERPL-CCNC: 57 U/L
ANION GAP SERPL CALC-SCNC: 13 MMOL/L (ref 0–18)
AST SERPL-CCNC: 51 U/L (ref ?–34)
ATRIAL RATE: 101 BPM
BASOPHILS # BLD AUTO: 0.11 X10(3) UL (ref 0–0.2)
BASOPHILS NFR BLD AUTO: 1.7 %
BILIRUB SERPL-MCNC: 0.5 MG/DL (ref 0.3–1.2)
BUN BLD-MCNC: 85 MG/DL (ref 9–23)
CALCIUM BLD-MCNC: 9.4 MG/DL (ref 8.7–10.6)
CHLORIDE SERPL-SCNC: 106 MMOL/L (ref 98–112)
CHOLEST SERPL-MCNC: 167 MG/DL (ref ?–200)
CO2 SERPL-SCNC: 20 MMOL/L (ref 21–32)
CREAT BLD-MCNC: 11.73 MG/DL
CRP SERPL-MCNC: 1.4 MG/DL (ref ?–0.5)
EGFRCR SERPLBLD CKD-EPI 2021: 5 ML/MIN/1.73M2 (ref 60–?)
EOSINOPHIL # BLD AUTO: 0.16 X10(3) UL (ref 0–0.7)
EOSINOPHIL NFR BLD AUTO: 2.5 %
ERYTHROCYTE [DISTWIDTH] IN BLOOD BY AUTOMATED COUNT: 14 %
GLOBULIN PLAS-MCNC: 3.2 G/DL (ref 2–3.5)
GLUCOSE BLD-MCNC: 226 MG/DL (ref 70–99)
HCT VFR BLD AUTO: 22.7 %
HDLC SERPL-MCNC: 45 MG/DL (ref 40–59)
HGB BLD-MCNC: 8 G/DL
IMM GRANULOCYTES # BLD AUTO: 0.03 X10(3) UL (ref 0–1)
IMM GRANULOCYTES NFR BLD: 0.5 %
LDLC SERPL CALC-MCNC: 94 MG/DL (ref ?–100)
LIPASE SERPL-CCNC: 58 U/L (ref 12–53)
LYMPHOCYTES # BLD AUTO: 0.87 X10(3) UL (ref 1–4)
LYMPHOCYTES NFR BLD AUTO: 13.6 %
MCH RBC QN AUTO: 33.1 PG (ref 26–34)
MCHC RBC AUTO-ENTMCNC: 35.2 G/DL (ref 31–37)
MCV RBC AUTO: 93.8 FL
MONOCYTES # BLD AUTO: 0.63 X10(3) UL (ref 0.1–1)
MONOCYTES NFR BLD AUTO: 9.9 %
NEUTROPHILS # BLD AUTO: 4.58 X10 (3) UL (ref 1.5–7.7)
NEUTROPHILS # BLD AUTO: 4.58 X10(3) UL (ref 1.5–7.7)
NEUTROPHILS NFR BLD AUTO: 71.8 %
NONHDLC SERPL-MCNC: 122 MG/DL (ref ?–130)
NT-PROBNP SERPL-MCNC: ABNORMAL PG/ML (ref ?–125)
OSMOLALITY SERPL CALC.SUM OF ELEC: 321 MOSM/KG (ref 275–295)
P AXIS: 46 DEGREES
P-R INTERVAL: 192 MS
PLATELET # BLD AUTO: 224 10(3)UL (ref 150–450)
POTASSIUM SERPL-SCNC: 4.2 MMOL/L (ref 3.5–5.1)
PROT SERPL-MCNC: 7 G/DL (ref 5.7–8.2)
Q-T INTERVAL: 380 MS
QRS DURATION: 100 MS
QTC CALCULATION (BEZET): 492 MS
R AXIS: 39 DEGREES
RBC # BLD AUTO: 2.42 X10(6)UL
SODIUM SERPL-SCNC: 139 MMOL/L (ref 136–145)
T AXIS: 54 DEGREES
TRIGL SERPL-MCNC: 158 MG/DL (ref 30–149)
TROPONIN I SERPL HS-MCNC: 709 NG/L
TROPONIN I SERPL HS-MCNC: 772 NG/L
VENTRICULAR RATE: 101 BPM
VLDLC SERPL CALC-MCNC: 26 MG/DL (ref 0–30)
WBC # BLD AUTO: 6.4 X10(3) UL (ref 4–11)

## 2025-03-26 PROCEDURE — 94760 N-INVAS EAR/PLS OXIMETRY 1: CPT

## 2025-03-26 PROCEDURE — 84484 ASSAY OF TROPONIN QUANT: CPT | Performed by: INTERNAL MEDICINE

## 2025-03-26 PROCEDURE — 96375 TX/PRO/DX INJ NEW DRUG ADDON: CPT

## 2025-03-26 PROCEDURE — 85025 COMPLETE CBC W/AUTO DIFF WBC: CPT | Performed by: EMERGENCY MEDICINE

## 2025-03-26 PROCEDURE — 80061 LIPID PANEL: CPT | Performed by: EMERGENCY MEDICINE

## 2025-03-26 PROCEDURE — 80053 COMPREHEN METABOLIC PANEL: CPT | Performed by: EMERGENCY MEDICINE

## 2025-03-26 PROCEDURE — P9047 ALBUMIN (HUMAN), 25%, 50ML: HCPCS | Performed by: INTERNAL MEDICINE

## 2025-03-26 PROCEDURE — 99285 EMERGENCY DEPT VISIT HI MDM: CPT

## 2025-03-26 PROCEDURE — 96374 THER/PROPH/DIAG INJ IV PUSH: CPT

## 2025-03-26 PROCEDURE — 84484 ASSAY OF TROPONIN QUANT: CPT | Performed by: EMERGENCY MEDICINE

## 2025-03-26 PROCEDURE — 71045 X-RAY EXAM CHEST 1 VIEW: CPT | Performed by: EMERGENCY MEDICINE

## 2025-03-26 PROCEDURE — 93010 ELECTROCARDIOGRAM REPORT: CPT

## 2025-03-26 PROCEDURE — 83690 ASSAY OF LIPASE: CPT | Performed by: EMERGENCY MEDICINE

## 2025-03-26 PROCEDURE — 86140 C-REACTIVE PROTEIN: CPT | Performed by: EMERGENCY MEDICINE

## 2025-03-26 PROCEDURE — 90935 HEMODIALYSIS ONE EVALUATION: CPT | Performed by: INTERNAL MEDICINE

## 2025-03-26 PROCEDURE — 70450 CT HEAD/BRAIN W/O DYE: CPT | Performed by: EMERGENCY MEDICINE

## 2025-03-26 PROCEDURE — 93005 ELECTROCARDIOGRAM TRACING: CPT

## 2025-03-26 PROCEDURE — 83880 ASSAY OF NATRIURETIC PEPTIDE: CPT | Performed by: EMERGENCY MEDICINE

## 2025-03-26 RX ORDER — PANTOPRAZOLE SODIUM 40 MG/1
40 TABLET, DELAYED RELEASE ORAL
Status: DISCONTINUED | OUTPATIENT
Start: 2025-03-26 | End: 2025-03-27

## 2025-03-26 RX ORDER — BUPROPION HYDROCHLORIDE 150 MG/1
150 TABLET ORAL DAILY
Status: DISCONTINUED | OUTPATIENT
Start: 2025-03-26 | End: 2025-03-27

## 2025-03-26 RX ORDER — TAMSULOSIN HYDROCHLORIDE 0.4 MG/1
0.4 CAPSULE ORAL DAILY
Status: DISCONTINUED | OUTPATIENT
Start: 2025-03-26 | End: 2025-03-26

## 2025-03-26 RX ORDER — DEXTROAMPHETAMINE SACCHARATE, AMPHETAMINE ASPARTATE, DEXTROAMPHETAMINE SULFATE AND AMPHETAMINE SULFATE 2.5; 2.5; 2.5; 2.5 MG/1; MG/1; MG/1; MG/1
10 TABLET ORAL
Status: DISCONTINUED | OUTPATIENT
Start: 2025-03-26 | End: 2025-03-27

## 2025-03-26 RX ORDER — ATORVASTATIN CALCIUM 20 MG/1
20 TABLET, FILM COATED ORAL EVERY EVENING
Status: DISCONTINUED | OUTPATIENT
Start: 2025-03-26 | End: 2025-03-27

## 2025-03-26 RX ORDER — CLONIDINE HYDROCHLORIDE 0.1 MG/1
0.1 TABLET ORAL 2 TIMES DAILY
Status: DISCONTINUED | OUTPATIENT
Start: 2025-03-26 | End: 2025-03-27

## 2025-03-26 RX ORDER — ARIPIPRAZOLE 5 MG/1
5 TABLET ORAL DAILY
Status: DISCONTINUED | OUTPATIENT
Start: 2025-03-26 | End: 2025-03-26

## 2025-03-26 RX ORDER — GABAPENTIN 100 MG/1
200 CAPSULE ORAL 3 TIMES DAILY
Status: DISCONTINUED | OUTPATIENT
Start: 2025-03-26 | End: 2025-03-27

## 2025-03-26 RX ORDER — NIFEDIPINE 30 MG/1
30 TABLET, EXTENDED RELEASE ORAL DAILY
Status: DISCONTINUED | OUTPATIENT
Start: 2025-03-26 | End: 2025-03-26

## 2025-03-26 RX ORDER — ACETAMINOPHEN 500 MG
500 TABLET ORAL EVERY 6 HOURS PRN
Status: DISCONTINUED | OUTPATIENT
Start: 2025-03-26 | End: 2025-03-27

## 2025-03-26 RX ORDER — ACETAMINOPHEN 500 MG
500 TABLET ORAL EVERY 4 HOURS PRN
Status: DISCONTINUED | OUTPATIENT
Start: 2025-03-26 | End: 2025-03-27

## 2025-03-26 RX ORDER — ONDANSETRON 2 MG/ML
4 INJECTION INTRAMUSCULAR; INTRAVENOUS ONCE
Status: COMPLETED | OUTPATIENT
Start: 2025-03-26 | End: 2025-03-26

## 2025-03-26 RX ORDER — CALCIUM ACETATE 667 MG/1
667 CAPSULE ORAL
Status: DISCONTINUED | OUTPATIENT
Start: 2025-03-26 | End: 2025-03-27

## 2025-03-26 RX ORDER — SENNOSIDES 8.6 MG
17.2 TABLET ORAL NIGHTLY PRN
Status: DISCONTINUED | OUTPATIENT
Start: 2025-03-26 | End: 2025-03-27

## 2025-03-26 RX ORDER — MORPHINE SULFATE 4 MG/ML
4 INJECTION, SOLUTION INTRAMUSCULAR; INTRAVENOUS ONCE
Status: COMPLETED | OUTPATIENT
Start: 2025-03-26 | End: 2025-03-26

## 2025-03-26 RX ORDER — PANTOPRAZOLE SODIUM 40 MG/1
40 TABLET, DELAYED RELEASE ORAL
COMMUNITY

## 2025-03-26 RX ORDER — HYDROCODONE BITARTRATE AND ACETAMINOPHEN 5; 325 MG/1; MG/1
1 TABLET ORAL EVERY 6 HOURS PRN
COMMUNITY

## 2025-03-26 RX ORDER — NIFEDIPINE 30 MG/1
30 TABLET, EXTENDED RELEASE ORAL DAILY
Status: DISCONTINUED | OUTPATIENT
Start: 2025-03-27 | End: 2025-03-27

## 2025-03-26 RX ORDER — HYDRALAZINE HYDROCHLORIDE 25 MG/1
25 TABLET, FILM COATED ORAL 3 TIMES DAILY
Status: DISCONTINUED | OUTPATIENT
Start: 2025-03-26 | End: 2025-03-27

## 2025-03-26 RX ORDER — LAMOTRIGINE 100 MG/1
100 TABLET ORAL DAILY
Status: DISCONTINUED | OUTPATIENT
Start: 2025-03-26 | End: 2025-03-27

## 2025-03-26 RX ORDER — CARVEDILOL 12.5 MG/1
25 TABLET ORAL 2 TIMES DAILY WITH MEALS
Status: DISCONTINUED | OUTPATIENT
Start: 2025-03-26 | End: 2025-03-27

## 2025-03-26 RX ORDER — LABETALOL HYDROCHLORIDE 5 MG/ML
10 INJECTION, SOLUTION INTRAVENOUS ONCE
Status: COMPLETED | OUTPATIENT
Start: 2025-03-26 | End: 2025-03-26

## 2025-03-26 RX ORDER — HYDROCODONE BITARTRATE AND ACETAMINOPHEN 5; 325 MG/1; MG/1
1 TABLET ORAL EVERY 6 HOURS PRN
Status: DISCONTINUED | OUTPATIENT
Start: 2025-03-26 | End: 2025-03-27

## 2025-03-26 RX ORDER — HYDRALAZINE HYDROCHLORIDE 20 MG/ML
10 INJECTION INTRAMUSCULAR; INTRAVENOUS EVERY 6 HOURS PRN
Status: DISCONTINUED | OUTPATIENT
Start: 2025-03-26 | End: 2025-03-27

## 2025-03-26 RX ORDER — BENZONATATE 200 MG/1
200 CAPSULE ORAL 3 TIMES DAILY PRN
Status: DISCONTINUED | OUTPATIENT
Start: 2025-03-26 | End: 2025-03-27

## 2025-03-26 RX ORDER — SEVELAMER CARBONATE 800 MG/1
2400 TABLET, FILM COATED ORAL
Status: DISCONTINUED | OUTPATIENT
Start: 2025-03-26 | End: 2025-03-27

## 2025-03-26 RX ORDER — POLYETHYLENE GLYCOL 3350 17 G/17G
17 POWDER, FOR SOLUTION ORAL DAILY PRN
Status: DISCONTINUED | OUTPATIENT
Start: 2025-03-26 | End: 2025-03-27

## 2025-03-26 RX ORDER — ARIPIPRAZOLE 10 MG/1
10 TABLET ORAL DAILY
Status: DISCONTINUED | OUTPATIENT
Start: 2025-03-26 | End: 2025-03-26

## 2025-03-26 RX ORDER — ONDANSETRON 2 MG/ML
4 INJECTION INTRAMUSCULAR; INTRAVENOUS EVERY 6 HOURS PRN
Status: DISCONTINUED | OUTPATIENT
Start: 2025-03-26 | End: 2025-03-27

## 2025-03-26 RX ORDER — ALBUTEROL SULFATE 90 UG/1
2 INHALANT RESPIRATORY (INHALATION) EVERY 6 HOURS PRN
Status: DISCONTINUED | OUTPATIENT
Start: 2025-03-26 | End: 2025-03-27

## 2025-03-26 RX ORDER — AMOXICILLIN AND CLAVULANATE POTASSIUM 500; 125 MG/1; MG/1
500 TABLET, FILM COATED ORAL EVERY 8 HOURS SCHEDULED
Status: DISCONTINUED | OUTPATIENT
Start: 2025-03-26 | End: 2025-03-26

## 2025-03-26 RX ORDER — LOSARTAN POTASSIUM 100 MG/1
100 TABLET ORAL DAILY
Status: DISCONTINUED | OUTPATIENT
Start: 2025-03-26 | End: 2025-03-27

## 2025-03-26 RX ORDER — FLUTICASONE PROPIONATE 50 MCG
1 SPRAY, SUSPENSION (ML) NASAL DAILY
Status: DISCONTINUED | OUTPATIENT
Start: 2025-03-26 | End: 2025-03-27

## 2025-03-26 RX ORDER — BISACODYL 10 MG
10 SUPPOSITORY, RECTAL RECTAL
Status: DISCONTINUED | OUTPATIENT
Start: 2025-03-26 | End: 2025-03-27

## 2025-03-26 RX ORDER — ALBUMIN (HUMAN) 12.5 G/50ML
25 SOLUTION INTRAVENOUS
Status: DISCONTINUED | OUTPATIENT
Start: 2025-03-26 | End: 2025-03-27

## 2025-03-26 RX ORDER — PROCHLORPERAZINE EDISYLATE 5 MG/ML
5 INJECTION INTRAMUSCULAR; INTRAVENOUS EVERY 8 HOURS PRN
Status: DISCONTINUED | OUTPATIENT
Start: 2025-03-26 | End: 2025-03-27

## 2025-03-26 RX ORDER — AMOXICILLIN AND CLAVULANATE POTASSIUM 500; 125 MG/1; MG/1
500 TABLET, FILM COATED ORAL EVERY 24 HOURS
Status: DISCONTINUED | OUTPATIENT
Start: 2025-03-27 | End: 2025-03-27

## 2025-03-26 RX ORDER — TAMSULOSIN HYDROCHLORIDE 0.4 MG/1
0.4 CAPSULE ORAL NIGHTLY
Status: DISCONTINUED | OUTPATIENT
Start: 2025-03-27 | End: 2025-03-27

## 2025-03-26 NOTE — CONSULTS
[facility]   part of [facility]    Report of Consultation    Godwin Fonseca Patient Status:  Observation    1980 MRN [id number]   Location [facility] 8NE-A Attending Miguel Garsia, DO   Hosp Day # 0 PCP Adrian Small MD       REASON FOR CONSULT:     ESRD    HISTORY OF PRESENT ILLNESS:     45 yo M with history of ESRD on iHD MWF via LUE AVF, HTN, severe MR s/p MVR x 2, HFpEF, DVT/PE, TIA, MGUS, bipolar disorder, recurrent GI bleed, diffuse body pains presents with rectal bleeding again.  States he has been bleeding since Monday - had passing out episode he reports that day and did not go to dialysis as scheduled.  He has not been eating much.  He is short of breath but denies cough, fever.  He denies leg swelling.  Still makes urine.  States his fistula has been working well.    REVIEW OF SYSTEMS:     Please see HPI for pertinent positives. 10 point review of systems otherwise reviewed and negative.     HISTORY:     Past Medical History:    Anxiety state    Arrhythmia    Asthma (HCC)    Attention deficit hyperactivity disorder (ADHD)    Back problem    Bipolar 1 disorder (HCC)    CKD (chronic kidney disease) stage 3, GFR 30-59 ml/min (HCC)    Dr Meeks    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HCC)    Congestive heart disease (HCC)    COPD (chronic obstructive pulmonary disease) (formerly [facility])    Coronary atherosclerosis    Deep vein thrombosis (formerly [facility])    at age 17 R/T cast    Depression    Diabetes (formerly [facility])    Dialysis patient    Diverticulosis of large intestine    Essential hypertension    3/21 echo: severe concentric LVH with normal EF and no MR or pHTN    Extrinsic asthma, unspecified    Heart attack (HCC)    - angiogram- no intervention    Heart valve disease    mitral valve repair in /    High blood pressure    High cholesterol    History of blood transfusion    History of mitral valve repair    Hyperlipidemia    Low back pain    tight and stiff after sweeping and mopping    LVH (left ventricular  hypertrophy)    Migraines    Mixed hyperlipidemia     HDL 38 LDL 97 VLDL 57     Monoclonal gammopathy    IgG kappa     Muscle weakness    MVP (mitral valve prolapse)    Repair 1994 at [place]; echoes as recently as 3/21 show mild or trivial MR and no stenosis    Neuropathy    Osteoarthritis    hip ,knees    Pneumonia due to organism    Pulmonary embolism (HCC)    Renal disorder    Stroke (HCC)    TIA (transient ischemic attack)    Initial history of left-sided weakness and slurred speech. (+) cocaine. MRI of the brain, CT angiogram of the head and neck, and 2D echo are all unremarkable.     TMJ (dislocation of temporomandibular joint)    Troponin level elevated    Trop 60 60 47 with TIA and no CP: Lexiscan negative with EF 51    Visual impairment     Past Surgical History:   Procedure Laterality Date    Av fistula revision, open Left     Cabg      Colonoscopy N/A 03/26/2023    Procedure: COLONOSCOPY;  Surgeon: Heath Vu MD;  Location:  ENDOSCOPY    Colonoscopy N/A 12/30/2023    Procedure: COLONOSCOPY with cold snare polypectomy and forcep polypectomy;  Surgeon: Ousmane Suarez MD;  Location:  ENDOSCOPY    Colonoscopy N/A 3/9/2025    Procedure: COLONOSCOPY WITH COLD SNARE POLYPECTOMY AND ENDO CLIPS X 2;  Surgeon: Bakari Noguera MD;  Location:  ENDOSCOPY    Colonoscopy & polypectomy  2019    Egd  2019    Duodenitis. Biopsied. EUS for weight loss was negative    Heart surgery      Hernia surgery  08/17/2022    Dr Barnes    Laminectomy,>2 sgmt,lumbar  09/06/2018    L4-L5 Decomp Discectomy ROEM L4-L5    Mitralplasty w cp bypass  1994    [place]: Repair    Repair rotator cuff,chronic Left     torn and had a ruptured bicep    Sinus surgery        Spine surgery procedure unlisted      Valve repair  1994    mitral valve     Family History   Problem Relation Age of Onset    Hypertension Father     Alcohol and Other Disorders Associated Father     Substance Abuse Father         cocaine    Dementia  Father     Cancer Father         lung    Diabetes Mother     Cancer Mother         multiple myeloma    Hypertension Mother     Anxiety Maternal Aunt     Depression Maternal Aunt     Anxiety Maternal Aunt     Depression Maternal Aunt     Bipolar Disorder Maternal Aunt     Diabetes Maternal Grandmother     Hypertension Maternal Grandmother     Cancer Maternal Grandfather         stomach cancer    Diabetes Maternal Grandfather     Hypertension Maternal Grandfather     Alcohol and Other Disorders Associated Maternal Grandfather     Hypertension Paternal Grandmother     Hypertension Paternal Grandfather     Cancer Sister         uterine and ovarian    Hypertension Sister     Cancer Maternal Uncle         lung    Cancer Paternal Aunt         throat      reports that he quit smoking about 3 years ago. His smoking use included cigarettes. He started smoking about 30 years ago. He has a 27 pack-year smoking history. He has never been exposed to tobacco smoke. He has never used smokeless tobacco. He reports that he does not drink alcohol and does not use drugs.    ALLERGIES:     Allergies[1]    MEDICATIONS:       Current Facility-Administered Medications:     acetaminophen (Tylenol Extra Strength) tab 500 mg, 500 mg, Oral, Q4H PRN    melatonin tab 3 mg, 3 mg, Oral, Nightly PRN    ondansetron (Zofran) 4 MG/2ML injection 4 mg, 4 mg, Intravenous, Q6H PRN    prochlorperazine (Compazine) 10 MG/2ML injection 5 mg, 5 mg, Intravenous, Q8H PRN    polyethylene glycol (PEG 3350) (Miralax) 17 g oral packet 17 g, 17 g, Oral, Daily PRN    sennosides (Senokot) tab 17.2 mg, 17.2 mg, Oral, Nightly PRN    bisacodyl (Dulcolax) 10 MG rectal suppository 10 mg, 10 mg, Rectal, Daily PRN    hydrALAzine (Apresoline) 20 mg/mL injection 10 mg, 10 mg, Intravenous, Q6H PRN    acetaminophen (Tylenol Extra Strength) tab 500 mg, 500 mg, Oral, Q6H PRN    albuterol (Ventolin HFA) 108 (90 Base) MCG/ACT inhaler 2 puff, 2 puff, Inhalation, Q6H PRN     atorvastatin (Lipitor) tab 20 mg, 20 mg, Oral, QPM    benzonatate (Tessalon) cap 200 mg, 200 mg, Oral, TID PRN    buPROPion ER (Wellbutrin XL) 24 hr tab 150 mg, 150 mg, Oral, Daily    calcium acetate (Phoslo) cap 667 mg, 667 mg, Oral, TID with meals    carvedilol (Coreg) tab 25 mg, 25 mg, Oral, BID with meals    cloNIDine (Catapres) tab 0.1 mg, 0.1 mg, Oral, BID    FLUoxetine (PROzac) cap 40 mg, 40 mg, Oral, Daily    fluticasone propionate (Flonase) 50 MCG/ACT nasal suspension 1 spray, 1 spray, Each Nare, Daily    gabapentin (Neurontin) cap 200 mg, 200 mg, Oral, TID    hydrALAZINE (Apresoline) tab 25 mg, 25 mg, Oral, TID    HYDROcodone-acetaminophen (Norco) 5-325 MG per tab 1 tablet, 1 tablet, Oral, Q6H PRN    lamoTRIgine (LaMICtal) tab 100 mg, 100 mg, Oral, Daily    losartan (Cozaar) tab 100 mg, 100 mg, Oral, Daily    NIFEdipine ER (Procardia-XL) 24 hr tab 30 mg, 30 mg, Oral, Daily    pantoprazole (Protonix) DR tab 40 mg, 40 mg, Oral, QAM AC    phenylephrine-min oil-noe (Formula R) 0.25-14-74.9 % rectal ointment 1 g, 1 g, Rectal, QID PRN    sevelamer carbonate (Renvela) tab 2,400 mg, 2,400 mg, Oral, TID CC    tamsulosin (Flomax) cap 0.4 mg, 0.4 mg, Oral, Daily    amphetamine-dextroamphetamine (Adderall) tab 10 mg, 10 mg, Oral, BID AC    ARIPiprazole (Abilify) tab 15 mg, 15 mg, Oral, Daily    [START ON 3/27/2025] amoxicillin clavulanate (Augmentin) 500-125 MG per tab 500 mg, 500 mg, Oral, Q24H    sodium chloride 0.9 % IV bolus 100 mL, 100 mL, Intravenous, Q30 Min PRN **AND** albumin human (Albumin) 25% injection 25 g, 25 g, Intravenous, PRN Dialysis  No current outpatient medications on file.         PHYSICAL EXAM:     Vital Signs: BP (!) 168/112 (BP Location: Right arm)   Pulse 102   Temp 98.2 °F (36.8 °C) (Oral)   Resp 20   Wt 244 lb 7.8 oz (110.9 kg)   SpO2 95%   BMI 31.39 kg/m²   Temp (24hrs), Av.7 °F (36.5 °C), Min:97.2 °F (36.2 °C), Max:98.2 °F (36.8 °C)       Intake/Output Summary (Last 24 hours)  at 3/26/2025 1257  Last data filed at 3/26/2025 0945  Gross per 24 hour   Intake 290 ml   Output 500 ml   Net -210 ml     Wt Readings from Last 3 Encounters:   03/26/25 244 lb 7.8 oz (110.9 kg)   03/18/25 257 lb (116.6 kg)   03/16/25 241 lb 11.2 oz (109.6 kg)       General: appears fatigued, uncomfortable  Skin: no visible rashes  HEENT: NCAT  Cardiac: Regular rate and rhythm, no murmur/gallop or rub  Lungs: CTAB, no wheeze, no rale, no rhonchi  Abdomen: Soft, NTND  Extremities: warm, well perfused, no leg edema  Neurologic/Psych: mentating well, no asterixis  LUE AVF + thrill and bruit    LABORATORY DATA:       Lab Results   Component Value Date     (H) 03/26/2025    BUN 85 (H) 03/26/2025    BUNCREA 13.0 03/07/2022    CREATSERUM 11.73 (H) 03/26/2025    ANIONGAP 13 03/26/2025    GFR 59 (L) 01/04/2018    GFRNAA 30 (L) 07/12/2022    GFRAA 35 (L) 07/12/2022    CA 9.4 03/26/2025    OSMOCALC 321 (H) 03/26/2025    ALKPHO 108 03/26/2025    AST 51 (H) 03/26/2025    ALT 57 (H) 03/26/2025    BILT 0.5 03/26/2025    TP 7.0 03/26/2025    ALB 3.8 03/26/2025    GLOBULIN 3.2 03/26/2025     03/26/2025    K 4.2 03/26/2025     03/26/2025    CO2 20.0 (L) 03/26/2025     Lab Results   Component Value Date    WBC 6.4 03/26/2025    RBC 2.42 (L) 03/26/2025    HGB 8.0 (L) 03/26/2025    HCT 22.7 (L) 03/26/2025    .0 03/26/2025    MPV 11.5 12/14/2012    MCV 93.8 03/26/2025    MCH 33.1 03/26/2025    MCHC 35.2 03/26/2025    RDW 14.0 03/26/2025    NEPRELIM 4.58 03/26/2025    NEPERCENT 71.8 03/26/2025    LYPERCENT 13.6 03/26/2025    MOPERCENT 9.9 03/26/2025    EOPERCENT 2.5 03/26/2025    BAPERCENT 1.7 03/26/2025    NE 4.58 03/26/2025    LYMABS 0.87 (L) 03/26/2025    MOABSO 0.63 03/26/2025    EOABSO 0.16 03/26/2025    BAABSO 0.11 03/26/2025     Lab Results   Component Value Date    MALBP 167.00 05/24/2023    CREUR 142.00 05/24/2023    CREUR 142.00 05/24/2023     Lab Results   Component Value Date    COLORUR Light-Yellow  11/17/2024    CLARITY Clear 11/17/2024    SPECGRAVITY 1.013 11/17/2024    GLUUR Normal 11/17/2024    BILUR Negative 11/17/2024    KETUR Negative 11/17/2024    BLOODURINE Negative 11/17/2024    PHURINE 8.5 (H) 11/17/2024    PROUR 100 (A) 11/17/2024    UROBILINOGEN Normal 11/17/2024    NITRITE Negative 11/17/2024    LEUUR Negative 11/17/2024    WBCUR 1-5 11/17/2024    RBCUR 0-2 11/17/2024    EPIUR Few (A) 11/17/2024    BACUR Rare (A) 11/17/2024    CAOXUR Occasional (A) 04/20/2018    HYLUR Present (A) 05/14/2019         IMAGING:     Reviewed.      ASSESSMENT/PLAN:     43 yo M with history of ESRD on iHD MWF via LUE AVF, HTN, severe MR s/p MVR x 2, HFpEF, DVT/PE, TIA, MGUS, bipolar disorder, recurrent GI bleed, diffuse body pains presents with rectal bleeding again.    ESRD:  -- HD today 2-3L UF as tolerated  -- avoid morphine  -- gabapentin should be renally dosed - defer to RPh/primary service    Anemia:  -- in the setting of bleeding/ESRD  -- epo with HD when BPs better     MBD:  -- sevelamer 2400 TID AC  -- calcium acetate 667 TIDAC  -- low phos diet     HTN:  -- coreg and losartan pre-HD if SBP>180  -- clonidine should be continued BID to avoid rebound  -- takes remaining antihypertensives after HD unless SBP>180 per above     LUE AVF:  -- L arm precautions     Rectal bleeding:  -- colonoscopy with pan-diverticulosis, hemorrhoids and polyps recently     Will follow    Thank you for allowing me to participate in the care of your patient. Please do not hesitate to contact me with concerns or questions.    Lenka Bond MD  [facility]         [1]   Allergies  Allergen Reactions    Hydrochlorothiazide RASH and HIVES

## 2025-03-26 NOTE — ED PROVIDER NOTES
Patient Seen in: [facility] [place]      History     Chief Complaint   Patient presents with    Other     Stated Complaint: Rectal bleeding, abdominal pain, SOB    Subjective:   HPI      This is a 44-year-old male past medical history of bipolar disorder, hypertension, ESRD on HD Monday Wednesday Friday COPD now presents ED with complaints of profuse rectal bleeding tonight and abdominal pain which chronic shortness of breath and generalized weakness.  Reports symptoms been going on for over 24 hours.  Reports that he missed dialysis yesterday because of his symptoms.  Reports multiple episodes of bloody stools today.  Less complaint of diffuse abdominal pain cramping which she reports is chronic for him.  Denies any fever or chills.  Denies any chest pain or palpitations but feels short of breath.  Reports his last dialysis session was about 4 days ago.  Patient's reports that he was very lightheaded yesterday and fell and hit his head and has pain in the back of his head.  Denies LOC.  Denies being on any blood thinners.    Objective:     Past Medical History:    Anxiety state    Arrhythmia    Asthma ([facility])    Attention deficit hyperactivity disorder (ADHD)    Back problem    Bipolar 1 disorder (HCC)    CKD (chronic kidney disease) stage 3, GFR 30-59 ml/min ([facility])    Dr Meeks    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[facility])    Congestive heart disease (HCC)    COPD (chronic obstructive pulmonary disease) ([facility])    Coronary atherosclerosis    Deep vein thrombosis ([facility])    at age 17 R/T cast    Depression    Diabetes ([facility])    Dialysis patient    Diverticulosis of large intestine    Essential hypertension    3/21 echo: severe concentric LVH with normal EF and no MR or pHTN    Extrinsic asthma, unspecified    Heart attack (HCC)    2016- angiogram- no intervention    Heart valve disease    mitral valve repair in 1994/    High blood pressure    High cholesterol    History of blood transfusion    History of  mitral valve repair    Hyperlipidemia    Low back pain    tight and stiff after sweeping and mopping    LVH (left ventricular hypertrophy)    Migraines    Mixed hyperlipidemia     HDL 38 LDL 97 VLDL 57     Monoclonal gammopathy    IgG kappa     Muscle weakness    MVP (mitral valve prolapse)    Repair 1994 at [place]; echoes as recently as 3/21 show mild or trivial MR and no stenosis    Neuropathy    Osteoarthritis    hip ,knees    Pneumonia due to organism    Pulmonary embolism (HCC)    Renal disorder    Stroke (HCC)    TIA (transient ischemic attack)    Initial history of left-sided weakness and slurred speech. (+) cocaine. MRI of the brain, CT angiogram of the head and neck, and 2D echo are all unremarkable.     TMJ (dislocation of temporomandibular joint)    Troponin level elevated    Trop 60 60 47 with TIA and no CP: Lexiscan negative with EF 51    Visual impairment              Past Surgical History:   Procedure Laterality Date    Av fistula revision, open Left     Cabg      Colonoscopy N/A 03/26/2023    Procedure: COLONOSCOPY;  Surgeon: Heath Vu MD;  Location:  ENDOSCOPY    Colonoscopy N/A 12/30/2023    Procedure: COLONOSCOPY with cold snare polypectomy and forcep polypectomy;  Surgeon: Ousmane Suarez MD;  Location:  ENDOSCOPY    Colonoscopy N/A 3/9/2025    Procedure: COLONOSCOPY WITH COLD SNARE POLYPECTOMY AND ENDO CLIPS X 2;  Surgeon: Bakari Noguera MD;  Location:  ENDOSCOPY    Colonoscopy & polypectomy  2019    Egd  2019    Duodenitis. Biopsied. EUS for weight loss was negative    Heart surgery      Hernia surgery  08/17/2022    Dr Barnes    Laminectomy,>2 sgmt,lumbar  09/06/2018    L4-L5 Decomp Discectomy ROEM L4-L5    Mitralplasty w cp bypass  1994    [place]: Repair    Repair rotator cuff,chronic Left     torn and had a ruptured bicep    Sinus surgery        Spine surgery procedure unlisted      Valve repair  1994    mitral valve                Social History     Socioeconomic  History    Marital status:    Tobacco Use    Smoking status: Former     Current packs/day: 0.00     Average packs/day: 1 pack/day for 27.0 years (27.0 ttl pk-yrs)     Types: Cigarettes     Start date: 2/28/1995     Quit date: 2/28/2022     Years since quitting: 3.0     Passive exposure: Never    Smokeless tobacco: Never   Vaping Use    Vaping status: Never Used   Substance and Sexual Activity    Alcohol use: No    Drug use: No     Social Drivers of Health     Food Insecurity: No Food Insecurity (3/18/2025)    NCSS - Food Insecurity     Worried About Running Out of Food in the Last Year: No     Ran Out of Food in the Last Year: No   Transportation Needs: No Transportation Needs (3/18/2025)    NCSS - Transportation     Lack of Transportation: No   Housing Stability: Not At Risk (3/18/2025)    NCSS - Housing/Utilities     Has Housing: Yes     Worried About Losing Housing: No     Unable to Get Utilities: No                  Physical Exam     ED Triage Vitals   BP 03/26/25 0106 (!) 186/123   Pulse 03/26/25 0301 100   Resp 03/26/25 0106 22   Temp 03/26/25 0301 97.9 °F (36.6 °C)   Temp src 03/26/25 0301 Oral   SpO2 03/26/25 0301 94 %   O2 Device 03/26/25 0129 None (Room air)       Current Vitals:   Vital Signs  BP: (!) 190/122  Pulse: 100  Resp: 20  Temp: 97.9 °F (36.6 °C)  Temp src: Oral    Oxygen Therapy  SpO2: 94 %  O2 Device: None (Room air)        Physical Exam  Vitals and nursing note reviewed.   Constitutional:       Appearance: He is well-developed.   HENT:      Head: Normocephalic and atraumatic.   Eyes:      Pupils: Pupils are equal, round, and reactive to light.   Cardiovascular:      Rate and Rhythm: Normal rate and regular rhythm.   Pulmonary:      Effort: Pulmonary effort is normal.      Breath sounds: Normal breath sounds.   Abdominal:      General: Bowel sounds are normal.      Palpations: Abdomen is soft.   Musculoskeletal:         General: No deformity.      Cervical back: Normal range of motion  and neck supple.   Skin:     General: Skin is warm and dry.      Capillary Refill: Capillary refill takes less than 2 seconds.   Neurological:      Mental Status: He is alert and oriented to person, place, and time.             ED Course     Labs Reviewed   COMP METABOLIC PANEL (14) - Abnormal; Notable for the following components:       Result Value    Glucose 226 (*)     CO2 20.0 (*)     BUN 85 (*)     Creatinine 11.73 (*)     Calculated Osmolality 321 (*)     eGFR-Cr 5 (*)     AST 51 (*)     ALT 57 (*)     All other components within normal limits   TROPONIN I HIGH SENSITIVITY - Abnormal; Notable for the following components:    Troponin I (High Sensitivity) 709 (*)     All other components within normal limits   PRO BETA NATRIURETIC PEPTIDE - Abnormal; Notable for the following components:    Pro-Beta Natriuretic Peptide 38,419 (*)     All other components within normal limits   CBC WITH DIFFERENTIAL WITH PLATELET - Abnormal; Notable for the following components:    RBC 2.42 (*)     HGB 8.0 (*)     HCT 22.7 (*)     Lymphocyte Absolute 0.87 (*)     All other components within normal limits   LIPASE - Abnormal; Notable for the following components:    Lipase 58 (*)     All other components within normal limits   LIPID PANEL   C-REACTIVE PROTEIN   RAINBOW DRAW BLUE     EKG    Rate, intervals and axes as noted on EKG Report.  Rate: 101  Rhythm: Sinus Rhythm  Reading: J-point elevation V1 V2 V3 no gross ST elevation or depressions                Chest x-ray with right-sided consolidation appears improved from prior imaging right-sided pleural effusion as well.  CT brain negative for acute pathology no acute bleed.       MDM      This is a 44-year-old male who presents He with complaints of GI bleed shortness of breath generalized weakness malaise episode yesterday.  Patient is hypertensive on arrival appears nontoxic on exam and should be her examination benign answering questions appropriately moving all 4  extremities appropriately.  EKG is sinus no gross ST changes significant ischemia troponin is grossly elevated will need to trend.  Denies any chest pain though.  Chest x-ray with right-sided pleural effusion as well as consolidation which appears improved from prior imaging.  CT brain obtained as patient complaining of headache with status post fall yesterday after syncopal episode.  No boggy edema overlying the scalp.  CT brain negative for acute pathology.  Labs reviewed as expected as patient has missed 4 days of dialysis no significant hyperkalemia.  Given symptoms will admit to duly hospitalist.   Renal placed on consult. Per hospitalist will place GI  consult.  Patient's blood pressure is grossly elevated will give him a dose of labetalol here in the ED.  Patient will need dialysis today.        Medical Decision Making      Disposition and Plan     Clinical Impression:  1. Syncope, vasovagal    2. Rectal bleeding    3. Hypertension, unspecified type    4. SOB (shortness of breath)    5. Non-compliance with renal dialysis         Disposition:  There is no disposition on file for this visit.  There is no disposition time on file for this visit.    Follow-up:  No follow-up provider specified.        Medications Prescribed:  Current Discharge Medication List              Supplementary Documentation:

## 2025-03-26 NOTE — PLAN OF CARE
Received patient at 0730. Patient alert and oriented x4. Tele Rhythm NSR - ST.  O2 sats on RA. Lungs diminished. Bed is locked and low position. Call light & personal belongings within reach. C/O ABD  pain at this time, Norco PRN given.  Skin dry and intact. Reviewed plan of care with patient and verbalized understanding.     POC: Nephro and GI following  Dialysis today   Monitor BP    Problem: CARDIOVASCULAR - ADULT  Goal: Maintains optimal cardiac output and hemodynamic stability  Description: INTERVENTIONS:- Monitor vital signs, rhythm, and trends- Monitor for bleeding, hypotension and signs of decreased cardiac output- Evaluate effectiveness of vasoactive medications to optimize hemodynamic stability- Monitor arterial and/or venous puncture sites for bleeding and/or hematoma- Assess quality of pulses, skin color and temperature- Assess for signs of decreased coronary artery perfusion - ex. Angina- Evaluate fluid balance, assess for edema, trend weights  Outcome: Progressing  Goal: Absence of cardiac arrhythmias or at baseline  Description: INTERVENTIONS:- Continuous cardiac monitoring, monitor vital signs, obtain 12 lead EKG if indicated- Evaluate effectiveness of antiarrhythmic and heart rate control medications as ordered- Initiate emergency measures for life threatening arrhythmias- Monitor electrolytes and administer replacement therapy as ordered  Outcome: Progressing

## 2025-03-26 NOTE — CONSULTS
[facility]   part of [facility]    Report of Gastroenterology Consultation    Godwin Fonseca Patient Status:  Observation    1980 MRN [id number]   Location [facility] 8NE-A Attending Miguel Garsia, DO   Hosp Day # 0 PCP Adrian Small MD     Date of Admission:  3/26/2025  Date of Consult:  3/26/2025    Reason for Consultation:    Rectal bleeding    History of Present Illness:  Godwin Fonseca is a a(n) 44 year old male.     Patient with multiple medical problems including end-stage renal disease on hemodialysis presents with dizziness and weakness     C/o several months of rectal bleeding    Describes bleeding as bright red blood with some dark stool coating brown stool material    Patient had a colonoscopy about 2 weeks ago for this exact reason.  Polyps were removed.    History:  Past Medical History:    Anxiety state    Arrhythmia    Asthma (HCC)    Attention deficit hyperactivity disorder (ADHD)    Back problem    Bipolar 1 disorder (HCC)    CKD (chronic kidney disease) stage 3, GFR 30-59 ml/min (HCC)    Dr Meeks    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HCC)    Congestive heart disease (HCC)    COPD (chronic obstructive pulmonary disease) (HCC)    Coronary atherosclerosis    Deep vein thrombosis (HCC)    at age 17 R/T cast    Depression    Diabetes (HCC)    Dialysis patient    Diverticulosis of large intestine    Essential hypertension    3/21 echo: severe concentric LVH with normal EF and no MR or pHTN    Extrinsic asthma, unspecified    Heart attack (HCC)    - angiogram- no intervention    Heart valve disease    mitral valve repair in /    High blood pressure    High cholesterol    History of blood transfusion    History of mitral valve repair    Hyperlipidemia    Low back pain    tight and stiff after sweeping and mopping    LVH (left ventricular hypertrophy)    Migraines    Mixed hyperlipidemia     HDL 38 LDL 97 VLDL 57     Monoclonal gammopathy    IgG kappa     Muscle  weakness    MVP (mitral valve prolapse)    Repair 1994 at [place]; echoes as recently as 3/21 show mild or trivial MR and no stenosis    Neuropathy    Osteoarthritis    hip ,knees    Pneumonia due to organism    Pulmonary embolism (HCC)    Renal disorder    Stroke (HCC)    TIA (transient ischemic attack)    Initial history of left-sided weakness and slurred speech. (+) cocaine. MRI of the brain, CT angiogram of the head and neck, and 2D echo are all unremarkable.     TMJ (dislocation of temporomandibular joint)    Troponin level elevated    Trop 60 60 47 with TIA and no CP: Lexiscan negative with EF 51    Visual impairment     Past Surgical History:   Procedure Laterality Date    Av fistula revision, open Left     Cabg      Colonoscopy N/A 03/26/2023    Procedure: COLONOSCOPY;  Surgeon: Heath Vu MD;  Location:  ENDOSCOPY    Colonoscopy N/A 12/30/2023    Procedure: COLONOSCOPY with cold snare polypectomy and forcep polypectomy;  Surgeon: Ousmane Suarez MD;  Location:  ENDOSCOPY    Colonoscopy N/A 3/9/2025    Procedure: COLONOSCOPY WITH COLD SNARE POLYPECTOMY AND ENDO CLIPS X 2;  Surgeon: Bakari Noguera MD;  Location:  ENDOSCOPY    Colonoscopy & polypectomy  2019    Egd  2019    Duodenitis. Biopsied. EUS for weight loss was negative    Heart surgery      Hernia surgery  08/17/2022    Dr Barnes    Laminectomy,>2 sgmt,lumbar  09/06/2018    L4-L5 Decomp Discectomy ROEM L4-L5    Mitralplasty w cp bypass  1994    [place]: Repair    Repair rotator cuff,chronic Left     torn and had a ruptured bicep    Sinus surgery        Spine surgery procedure unlisted      Valve repair  1994    mitral valve     Family History   Problem Relation Age of Onset    Hypertension Father     Alcohol and Other Disorders Associated Father     Substance Abuse Father         cocaine    Dementia Father     Cancer Father         lung    Diabetes Mother     Cancer Mother         multiple myeloma    Hypertension Mother     Anxiety  Maternal Aunt     Depression Maternal Aunt     Anxiety Maternal Aunt     Depression Maternal Aunt     Bipolar Disorder Maternal Aunt     Diabetes Maternal Grandmother     Hypertension Maternal Grandmother     Cancer Maternal Grandfather         stomach cancer    Diabetes Maternal Grandfather     Hypertension Maternal Grandfather     Alcohol and Other Disorders Associated Maternal Grandfather     Hypertension Paternal Grandmother     Hypertension Paternal Grandfather     Cancer Sister         uterine and ovarian    Hypertension Sister     Cancer Maternal Uncle         lung    Cancer Paternal Aunt         throat      reports that he quit smoking about 3 years ago. His smoking use included cigarettes. He started smoking about 30 years ago. He has a 27 pack-year smoking history. He has never been exposed to tobacco smoke. He has never used smokeless tobacco. He reports that he does not drink alcohol and does not use drugs.    Allergies:  Allergies[1]    Medications:    Current Facility-Administered Medications:     acetaminophen (Tylenol Extra Strength) tab 500 mg, 500 mg, Oral, Q4H PRN    melatonin tab 3 mg, 3 mg, Oral, Nightly PRN    ondansetron (Zofran) 4 MG/2ML injection 4 mg, 4 mg, Intravenous, Q6H PRN    prochlorperazine (Compazine) 10 MG/2ML injection 5 mg, 5 mg, Intravenous, Q8H PRN    polyethylene glycol (PEG 3350) (Miralax) 17 g oral packet 17 g, 17 g, Oral, Daily PRN    sennosides (Senokot) tab 17.2 mg, 17.2 mg, Oral, Nightly PRN    bisacodyl (Dulcolax) 10 MG rectal suppository 10 mg, 10 mg, Rectal, Daily PRN    hydrALAzine (Apresoline) 20 mg/mL injection 10 mg, 10 mg, Intravenous, Q6H PRN    acetaminophen (Tylenol Extra Strength) tab 500 mg, 500 mg, Oral, Q6H PRN    albuterol (Ventolin HFA) 108 (90 Base) MCG/ACT inhaler 2 puff, 2 puff, Inhalation, Q6H PRN    atorvastatin (Lipitor) tab 20 mg, 20 mg, Oral, QPM    benzonatate (Tessalon) cap 200 mg, 200 mg, Oral, TID PRN    buPROPion ER (Wellbutrin XL) 24 hr tab  150 mg, 150 mg, Oral, Daily    calcium acetate (Phoslo) cap 667 mg, 667 mg, Oral, TID with meals    carvedilol (Coreg) tab 25 mg, 25 mg, Oral, BID with meals    cloNIDine (Catapres) tab 0.1 mg, 0.1 mg, Oral, BID    FLUoxetine (PROzac) cap 40 mg, 40 mg, Oral, Daily    fluticasone propionate (Flonase) 50 MCG/ACT nasal suspension 1 spray, 1 spray, Each Nare, Daily    gabapentin (Neurontin) cap 200 mg, 200 mg, Oral, TID    hydrALAZINE (Apresoline) tab 25 mg, 25 mg, Oral, TID    HYDROcodone-acetaminophen (Norco) 5-325 MG per tab 1 tablet, 1 tablet, Oral, Q6H PRN    lamoTRIgine (LaMICtal) tab 100 mg, 100 mg, Oral, Daily    losartan (Cozaar) tab 100 mg, 100 mg, Oral, Daily    pantoprazole (Protonix) DR tab 40 mg, 40 mg, Oral, QAM AC    phenylephrine-min oil-noe (Formula R) 0.25-14-74.9 % rectal ointment 1 g, 1 g, Rectal, QID PRN    sevelamer carbonate (Renvela) tab 2,400 mg, 2,400 mg, Oral, TID CC    amphetamine-dextroamphetamine (Adderall) tab 10 mg, 10 mg, Oral, BID AC    ARIPiprazole (Abilify) tab 15 mg, 15 mg, Oral, Daily    [START ON 3/27/2025] amoxicillin clavulanate (Augmentin) 500-125 MG per tab 500 mg, 500 mg, Oral, Q24H    sodium chloride 0.9 % IV bolus 100 mL, 100 mL, Intravenous, Q30 Min PRN **AND** albumin human (Albumin) 25% injection 25 g, 25 g, Intravenous, PRN Dialysis    [START ON 3/27/2025] NIFEdipine ER (Procardia-XL) 24 hr tab 30 mg, 30 mg, Oral, Daily    [START ON 3/27/2025] tamsulosin (Flomax) cap 0.4 mg, 0.4 mg, Oral, Nightly    Review of Systems:  Gastrointestinal: Denies positive test for blood stool, heartburn/indigestion/reflux, belching, difficulty swallowing, irregular bowel habits, painful swallowing, diarrhea, abdominal pain, constipation, nausea, incontinence of stool, vomiting, black stools, get full quickly at meals, blood in stools, abdominal distention, jaundice, flatulence, vomiting blood, bloating, hernia, laxative use, food/milk intolerance, pain with bowel movement,  hemorrhoids.  General: Denies fatigue, chills/fever, night sweats, weight loss, loss of appetite, weight gain, sleep disturbance.  Neurological: Denies frequent headaches, history of stroke, recent passing out, recent dizziness, convulsions/seizures, dementia.  Cardiovascular: Denies history of heart murmur, leg swelling, history of rheumatic fever, pacemaker, chest pain or pressure after eating or when upset, automatic defibrillator, angina, other implanted devices, irregular heart rate/palpitations, high cholesterol or triglycerides, coronary stents.  Respiratory: Denies shortness of breath, chronic/frequent hoarseness, wheezing, exposure to tuberculosis, chronic cough, spitting up blood, cough up sputum, sleep apnea.  Genitourinary: Denies kidney stones, painful/difficult urination, frequent urinary infections, frequent urination, blood in urine, incontinence, kidney failure, prostate problems.  Endocrine: Denies thyroid disease, denies diabetes.  Female complaints: Denies endometriosis, painful menstrual periods, heavy menstrual periods.  Patient is not pregnant.  Psychosocial: Denies history of mental illness, denies usually feeling lonely or depressed, denies history of depression, anxiety, history of physical or sexual abuse, stress, history of eating disorder.  Skin: Denies severe itching, unusual moles, rash, flushing, change in hair or nails.  Bone/joint: Denies arthritis, back pain, joint pain, osteoporosis.  Heme/Lymphatic: Denies easy bruising, anemia, excessive bleeding, enlarging or painful lymph nodes.  Allergy: Denies medication allergy, latex/rubber allergy, anaphylactic or other reaction to anesthesia, food allergy.   Eyes: Denies blurred/double vision, eye disease, glasses or contacts, glaucoma.  ENT: Denies nose or gums bleeding, mouth sores, bad breath or bad taste in mouth, hearing loss.  Physical Exam:    Blood pressure (!) 168/112, pulse 102, temperature 98.2 °F (36.8 °C), temperature source  Oral, resp. rate 20, weight 244 lb 7.8 oz (110.9 kg), SpO2 95%.    General: Appears alert, oriented x3 and in no acute distress.  HEENT: Normal. No neck vein distention. Thyroid not enlarged.  No lymphadenopathy.  CV: S1 and S2 normal.  No murmurs or gallops.  Lungs: Clear to auscultation.  Abdomen: Soft and nondistended.  Nontender.  No masses.  Bowel sounds are present.  Back: No CVA tenderness.  Extremities: No edema, cyanosis, or clubbing.  Skin: Warm    Laboratory Data:  Lab Results   Component Value Date    WBC 6.4 03/26/2025    HGB 8.0 03/26/2025    HCT 22.7 03/26/2025    .0 03/26/2025    CREATSERUM 11.73 03/26/2025    BUN 85 03/26/2025     03/26/2025    K 4.2 03/26/2025     03/26/2025    CO2 20.0 03/26/2025     03/26/2025    CA 9.4 03/26/2025    ALB 3.8 03/26/2025    ALKPHO 108 03/26/2025    BILT 0.5 03/26/2025    TP 7.0 03/26/2025    AST 51 03/26/2025    ALT 57 03/26/2025    LIP 58 03/26/2025    CRP 1.40 03/26/2025       Imaging:       Assessment/Plan:    Minor rectal bleeding with no drop in hemoglobin.  Recent colonoscopy showed diverticulosis and hemorrhoids.  Pattern of bleeding is most likely consistent with hemorrhoid bleeding.  Patient denies any constipation.    Hemorrhoidal care with Tucks pads, hemorrhoid cream (hydrocortisone cream or suppository daily) and high-fiber diet advised     Sign off at this point.  Please call us with questions.  Thank you very much for allowing us to participate in the care of your patient.      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 (HCC)    Chronic non-seasonal allergic rhinitis    Chronic sinusitis, unspecified location    ADHD (attention deficit hyperactivity disorder), inattentive type    Bipolar depression (HCC)    Essential hypertension    Mild persistent asthma without complication (HCC)    CKD (chronic kidney disease) stage 3, GFR 30-59 ml/min ([facility])    Self-inflicted  injury    Bipolar disorder in partial remission, most recent episode unspecified type (HCC)    Family history of prostate cancer    Fatigue, unspecified type    Alkaline phosphatase elevation    Hyperlipidemia, mixed    Low serum HDL    Spinal stenosis of lumbar region with neurogenic claudication    Prolapsed lumbar disc    Status post lumbar surgery    Cauda equina syndrome ([facility])    Lumbar disc prolapse with compression radiculopathy    Syncope and collapse    Hypokalemia    Orthostatic hypotension    Hypertension, unspecified type    Weight loss    Chest pain, unspecified type    History of mitral valve stenosis    Acute pericarditis, unspecified type ([facility])    Cervical radiculopathy    Elevated blood protein    IgG monoclonal protein disorder    MGUS (monoclonal gammopathy of unknown significance)    Hypertensive urgency    Bipolar 2 disorder ([facility])    Employment problem    Stress    Anxiety disorder, unspecified type    Weakness of left lower extremity    Rectal bleeding    Paresthesia of foot, bilateral    Obesity (BMI 30-39.9)    TIA (transient ischemic attack)    TMJ dysfunction    Inverted papilloma of nasal cavity    Type 2 diabetes mellitus with stage 3b chronic kidney disease, without long-term current use of insulin ([facility])    Acute kidney injury    Metabolic acidosis    Hyperglycemia    Chronic kidney disease, unspecified CKD stage    Abdominal distension    SOB (shortness of breath)    Hypertensive crisis    Acute on chronic congestive heart failure, unspecified heart failure type (HCC)    Acute congestive heart failure (HCC)    Acute congestive heart failure, unspecified heart failure type (HCC)    Acute on chronic diastolic congestive heart failure (HCC)    Stage 4 chronic kidney disease (HCC)    ROBERT (acute kidney injury)    Abdominal pain, left lower quadrant    Hypertensive emergency    Acute chest pain    Acute on chronic renal insufficiency    Chronic abdominal pain    Nonintractable headache,  unspecified chronicity pattern, unspecified headache type    Chronic right shoulder pain    HCAP (healthcare-associated pneumonia)    Acute intractable headache, unspecified headache type    ESRD (end stage renal disease) on dialysis ([facility])    Diarrhea, unspecified type    Primary hypertension    Dyspnea, unspecified type    Abdominal pain, acute    ESRD on dialysis ([facility])    Fluid overload    ESRD needing dialysis ([facility])    COVID-19 virus infection    Hypotension, unspecified hypotension type    Anemia    Acute renal failure (ARF)    Neck pain    Acute nonintractable headache, unspecified headache type    GI bleed    Chronic kidney disease with end stage renal failure on dialysis ([facility])    Lower abdominal pain    ESRD (end stage renal disease) ([facility])    Resistant hypertension    Community acquired pneumonia of right lower lobe of lung    Acute renal failure with acute renal cortical necrosis superimposed on stage 4 chronic kidney disease ([facility])    Acute renal failure, unspecified acute renal failure type    Hypervolemia, unspecified hypervolemia type    End stage renal disease on dialysis ([facility])    Acute respiratory failure with hypoxia ([facility])    Stage 5 chronic kidney disease on chronic dialysis ([facility])    Anemia, unspecified type    Hyperkalemia    Hemodialysis catheter infection, initial encounter    Type 2 diabetes mellitus with chronic kidney disease on chronic dialysis, without long-term current use of insulin ([facility])    Coronary artery disease involving native coronary artery of native heart without angina pectoris    Pneumonia of right lower lobe due to infectious organism    Expressive aphasia    Uncontrolled hypertension    Bipolar disorder with moderate depression ([facility])    BRBPR (bright red blood per rectum)    Weakness    Nosebleed    ESRD on hemodialysis ([facility])    Acute colitis    Chest pain of uncertain etiology    Medically noncompliant    Dialysis patient, noncompliant    History of lower GI bleeding    Fever,  unspecified fever cause    Nosocomial pneumonia    Nausea and vomiting in adult    Anemia due to chronic kidney disease, on chronic dialysis (HCC)    Chronic renal failure (CRF), stage 5 (HCC)    Diverticulosis of colon with hemorrhage    Shortness of breath    Elevated troponin    Elevated brain natriuretic peptide (BNP) level    Syncope, vasovagal    Non-compliance with renal dialysis            Tim Stafford MD  3/26/2025  4:28 PM         [1]   Allergies  Allergen Reactions    Hydrochlorothiazide RASH and HIVES

## 2025-03-26 NOTE — H&P
[facility] Hospitalist History and Physical      Chief Complaint   Patient presents with    Other        PCP: Adrian Small MD    History of Present Illness: Patient is a 44 year old male ESRD on MWF HD, hemorrhoids, diverticulosis, hypertension and chronic anemia presented for lightheadedness and presyncope.  Patient with recent hospital admission for pneumonia and rectal bleeding was discharged on oral antibiotics after evaluation of GI for chronic hemorrhoids.  Patient states recently, he has been more weak with increased rectal bleeding.  States he missed his dialysis session on Monday due to the symptoms.  States he felt very lightheaded and almost had a syncopal episode.  Denies LOC but he did fall.  No fevers or chills.    In the ER, hemodynamically stable.  CT head for acute findings.  CXR with persistent right sided lobar pneumonia.  Labs with Hgb 8 (near baseline of recent discharge), BMP with labs consistent with ESRD, troponin 709 and markedly elevated proBNP.  Patient admitted for further management.    Medical History:  Past Medical History:    Anxiety state    Arrhythmia    Asthma ([facility])    Attention deficit hyperactivity disorder (ADHD)    Back problem    Bipolar 1 disorder ([facility])    CKD (chronic kidney disease) stage 3, GFR 30-59 ml/min ([facility])    Dr Meeks    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[facility])    Congestive heart disease (HCC)    COPD (chronic obstructive pulmonary disease) ([facility])    Coronary atherosclerosis    Deep vein thrombosis ([facility])    at age 17 R/T cast    Depression    Diabetes ([facility])    Dialysis patient    Diverticulosis of large intestine    Essential hypertension    3/21 echo: severe concentric LVH with normal EF and no MR or pHTN    Extrinsic asthma, unspecified    Heart attack (HCC)    2016- angiogram- no intervention    Heart valve disease    mitral valve repair in 1994/    High blood pressure    High cholesterol    History of blood transfusion    History of mitral valve repair     Hyperlipidemia    Low back pain    tight and stiff after sweeping and mopping    LVH (left ventricular hypertrophy)    Migraines    Mixed hyperlipidemia     HDL 38 LDL 97 VLDL 57     Monoclonal gammopathy    IgG kappa     Muscle weakness    MVP (mitral valve prolapse)    Repair 1994 at [place]; echoes as recently as 3/21 show mild or trivial MR and no stenosis    Neuropathy    Osteoarthritis    hip ,knees    Pneumonia due to organism    Pulmonary embolism (HCC)    Renal disorder    Stroke (HCC)    TIA (transient ischemic attack)    Initial history of left-sided weakness and slurred speech. (+) cocaine. MRI of the brain, CT angiogram of the head and neck, and 2D echo are all unremarkable.     TMJ (dislocation of temporomandibular joint)    Troponin level elevated    Trop 60 60 47 with TIA and no CP: Lexiscan negative with EF 51    Visual impairment      Past Surgical History:   Procedure Laterality Date    Av fistula revision, open Left     Cabg      Colonoscopy N/A 03/26/2023    Procedure: COLONOSCOPY;  Surgeon: Heath Vu MD;  Location:  ENDOSCOPY    Colonoscopy N/A 12/30/2023    Procedure: COLONOSCOPY with cold snare polypectomy and forcep polypectomy;  Surgeon: Ousmane Suarez MD;  Location:  ENDOSCOPY    Colonoscopy N/A 3/9/2025    Procedure: COLONOSCOPY WITH COLD SNARE POLYPECTOMY AND ENDO CLIPS X 2;  Surgeon: Bakari Noguera MD;  Location:  ENDOSCOPY    Colonoscopy & polypectomy  2019    Egd  2019    Duodenitis. Biopsied. EUS for weight loss was negative    Heart surgery      Hernia surgery  08/17/2022    Dr Barnes    Laminectomy,>2 sgmt,lumbar  09/06/2018    L4-L5 Decomp Discectomy ROEM L4-L5    Mitralplasty w cp bypass  1994    [place]: Repair    Repair rotator cuff,chronic Left     torn and had a ruptured bicep    Sinus surgery        Spine surgery procedure unlisted      Valve repair  1994    mitral valve      Social History     Tobacco Use    Smoking status: Former     Current  packs/day: 0.00     Average packs/day: 1 pack/day for 27.0 years (27.0 ttl pk-yrs)     Types: Cigarettes     Start date: 2/28/1995     Quit date: 2/28/2022     Years since quitting: 3.0     Passive exposure: Never    Smokeless tobacco: Never   Substance Use Topics    Alcohol use: No      Family History   Problem Relation Age of Onset    Hypertension Father     Alcohol and Other Disorders Associated Father     Substance Abuse Father         cocaine    Dementia Father     Cancer Father         lung    Diabetes Mother     Cancer Mother         multiple myeloma    Hypertension Mother     Anxiety Maternal Aunt     Depression Maternal Aunt     Anxiety Maternal Aunt     Depression Maternal Aunt     Bipolar Disorder Maternal Aunt     Diabetes Maternal Grandmother     Hypertension Maternal Grandmother     Cancer Maternal Grandfather         stomach cancer    Diabetes Maternal Grandfather     Hypertension Maternal Grandfather     Alcohol and Other Disorders Associated Maternal Grandfather     Hypertension Paternal Grandmother     Hypertension Paternal Grandfather     Cancer Sister         uterine and ovarian    Hypertension Sister     Cancer Maternal Uncle         lung    Cancer Paternal Aunt         throat       Allergies[1]     Review of Systems  Comprehensive ROS reviewed and negative except for what is stated in HPI.      OBJECTIVE:  BP (!) 168/115 (BP Location: Right arm)   Pulse 104   Temp 98 °F (36.7 °C) (Oral)   Resp 22   Wt 244 lb 7.8 oz (110.9 kg)   SpO2 95%   BMI 31.39 kg/m²   General: Mild distress.  Alert and oriented.  HEENT:  EOMI, PERRLA.  Pulm: Diminished right-sided breath sounds.  Normal respiratory effort.  CV: Regular rate and rhythm, no murmur.   Abd: Soft, diffusely tender, nondistended.  MSK: Full range of motion in extremities, no obvious deformities.    Skin: No lesions or rashes.  Neuro: No obvious focal deficits.    Data Review:    LABS:   Lab Results   Component Value Date    WBC 6.4  03/26/2025    HGB 8.0 03/26/2025    HCT 22.7 03/26/2025    .0 03/26/2025    CREATSERUM 11.73 03/26/2025    BUN 85 03/26/2025     03/26/2025    K 4.2 03/26/2025     03/26/2025    CO2 20.0 03/26/2025     03/26/2025    CA 9.4 03/26/2025    ALB 3.8 03/26/2025    ALKPHO 108 03/26/2025    BILT 0.5 03/26/2025    TP 7.0 03/26/2025    AST 51 03/26/2025    ALT 57 03/26/2025    LIP 58 03/26/2025    CRP 1.40 03/26/2025       All lab work and imaging personally reviewed.    Assessment/Plan:     Assessment/ Plan:     #Rectal bleeding 2/2 chronic hemorrhoids  #Presyncope- likely vasovagal  -Hgb stable on admission --8  -Monitor hemoglobin, supportive care as required.  -GI consulted from ER. Will follow up recs. I d/w patient that he will likely need general surgery evaluation for hemorrhoidectomy.  Patient is agreeable to this inpatient if needed.    #Lobar pneumonia, recently ddx  #Acute hypoxic respiratory failure  -Patient on 1 L NC, attempt to wean to RA per baseline  -Continue Augmentin course to completion  -Supportive care as required.    #ESRD  -HD management per nephrology.     #Uncontrolled HTN  -Resume home antihypertensives     #Bipolar  #HFpEF  -Resume home meds accordingly     DVT ppx: SCDs  Diet: Renal  Disposition: obs    Outpatient records or previous hospital records reviewed.   Case discussed with patient/ family at bedside.     DO eKvin Peck [facility] Hospitalist         [1]   Allergies  Allergen Reactions    Hydrochlorothiazide RASH and HIVES

## 2025-03-26 NOTE — PLAN OF CARE
Received patient from [place] [facility] via ambulance. He was alert and oriented and complaining of abdominal pain. SBP of 180s. Skin check was done with the PCT. He refused to do orthostatic BP due to pain. Needs attended.    Problem: Patient/Family Goals  Goal: Patient/Family Long Term Goal  Description: Patient's Long Term Goal: will be able to go home without complication     Outcome: Progressing  Goal: Patient/Family Short Term Goal  Description: Patient's Short Term Goal: will control abdominal pain   Interventions: - routine vs, pain med, O2 prn, HD as ordered  Outcome: Progressing     Problem: CARDIOVASCULAR - ADULT  Goal: Maintains optimal cardiac output and hemodynamic stability  Description: INTERVENTIONS:- Monitor vital signs, rhythm, and trends- Monitor for bleeding, hypotension and signs of decreased cardiac output- Evaluate effectiveness of vasoactive medications to optimize hemodynamic stability- Monitor arterial and/or venous puncture sites for bleeding and/or hematoma- Assess quality of pulses, skin color and temperature- Assess for signs of decreased coronary artery perfusion - ex. Angina- Evaluate fluid balance, assess for edema, trend weights  Outcome: Progressing  Goal: Absence of cardiac arrhythmias or at baseline  Description: INTERVENTIONS:- Continuous cardiac monitoring, monitor vital signs, obtain 12 lead EKG if indicated- Evaluate effectiveness of antiarrhythmic and heart rate control medications as ordered- Initiate emergency measures for life threatening arrhythmias- Monitor electrolytes and administer replacement therapy as ordered  Outcome: Progressing     Problem: RESPIRATORY - ADULT  Goal: Achieves optimal ventilation and oxygenation  Description: INTERVENTIONS:- Assess for changes in respiratory status- Assess for changes in mentation and behavior- Position to facilitate oxygenation and minimize respiratory effort- Oxygen supplementation based on oxygen saturation or ABGs- Provide  Smoking Cessation handout, if applicable- Encourage broncho-pulmonary hygiene including cough, deep breathe, Incentive Spirometry- Assess the need for suctioning and perform as needed- Assess and instruct to report SOB or any respiratory difficulty- Respiratory Therapy support as indicated- Manage/alleviate anxiety- Monitor for signs/symptoms of CO2 retention  Outcome: Progressing     Problem: METABOLIC/FLUID AND ELECTROLYTES - ADULT  Goal: Electrolytes maintained within normal limits  Description: INTERVENTIONS:- Monitor labs and rhythm and assess patient for signs and symptoms of electrolyte imbalances- Administer electrolyte replacement as ordered- Monitor response to electrolyte replacements, including rhythm and repeat lab results as appropriate- Fluid restriction as ordered- Instruct patient on fluid and nutrition restrictions as appropriate  Outcome: Progressing

## 2025-03-26 NOTE — ED QUICK NOTES
Orders for admission, patient is aware of plan and ready to go upstairs. Any questions, please call ED RN Jayme at extension [phone].     Patient Covid vaccination status: Fully vaccinated     COVID Test Ordered in ED: None    COVID Suspicion at Admission: N/A    Running Infusions:  None    Mental Status/LOC at time of transport: A+OX4    Other pertinent information:  Skipped dialysis on Monday, chronic elevated troponin, and BNP  CIWA score: N/A   NIH score:  N/A

## 2025-03-26 NOTE — ED INITIAL ASSESSMENT (HPI)
Patient complains of rectal bleeding with defication for approx. 5 days. Patient also complains of weakness and SOB. Patient denies any chest pain, dizziness, or left arm pain. Patient is dialysis patient, missed dialysis yesterday.

## 2025-03-27 VITALS
OXYGEN SATURATION: 93 % | RESPIRATION RATE: 16 BRPM | DIASTOLIC BLOOD PRESSURE: 79 MMHG | SYSTOLIC BLOOD PRESSURE: 132 MMHG | WEIGHT: 242.31 LBS | BODY MASS INDEX: 31 KG/M2 | HEART RATE: 83 BPM | TEMPERATURE: 97 F

## 2025-03-27 LAB
ANION GAP SERPL CALC-SCNC: 10 MMOL/L (ref 0–18)
BASOPHILS # BLD AUTO: 0.09 X10(3) UL (ref 0–0.2)
BASOPHILS NFR BLD AUTO: 2 %
BUN BLD-MCNC: 37 MG/DL (ref 9–23)
CALCIUM BLD-MCNC: 8.7 MG/DL (ref 8.7–10.6)
CHLORIDE SERPL-SCNC: 104 MMOL/L (ref 98–112)
CO2 SERPL-SCNC: 27 MMOL/L (ref 21–32)
CREAT BLD-MCNC: 6.56 MG/DL
EGFRCR SERPLBLD CKD-EPI 2021: 10 ML/MIN/1.73M2 (ref 60–?)
EOSINOPHIL # BLD AUTO: 0.22 X10(3) UL (ref 0–0.7)
EOSINOPHIL NFR BLD AUTO: 4.8 %
ERYTHROCYTE [DISTWIDTH] IN BLOOD BY AUTOMATED COUNT: 13.8 %
GLUCOSE BLD-MCNC: 98 MG/DL (ref 70–99)
HCT VFR BLD AUTO: 22.1 %
HGB BLD-MCNC: 7.7 G/DL
IMM GRANULOCYTES # BLD AUTO: 0 X10(3) UL (ref 0–1)
IMM GRANULOCYTES NFR BLD: 0 %
LYMPHOCYTES # BLD AUTO: 1.15 X10(3) UL (ref 1–4)
LYMPHOCYTES NFR BLD AUTO: 25.2 %
MCH RBC QN AUTO: 32.1 PG (ref 26–34)
MCHC RBC AUTO-ENTMCNC: 34.8 G/DL (ref 31–37)
MCV RBC AUTO: 92.1 FL
MONOCYTES # BLD AUTO: 0.57 X10(3) UL (ref 0.1–1)
MONOCYTES NFR BLD AUTO: 12.5 %
NEUTROPHILS # BLD AUTO: 2.53 X10 (3) UL (ref 1.5–7.7)
NEUTROPHILS # BLD AUTO: 2.53 X10(3) UL (ref 1.5–7.7)
NEUTROPHILS NFR BLD AUTO: 55.5 %
OSMOLALITY SERPL CALC.SUM OF ELEC: 301 MOSM/KG (ref 275–295)
PHOSPHATE SERPL-MCNC: 4.7 MG/DL (ref 2.4–5.1)
PLATELET # BLD AUTO: 207 10(3)UL (ref 150–450)
POTASSIUM SERPL-SCNC: 3.6 MMOL/L (ref 3.5–5.1)
RBC # BLD AUTO: 2.4 X10(6)UL
SODIUM SERPL-SCNC: 141 MMOL/L (ref 136–145)
WBC # BLD AUTO: 4.6 X10(3) UL (ref 4–11)

## 2025-03-27 PROCEDURE — 85025 COMPLETE CBC W/AUTO DIFF WBC: CPT | Performed by: INTERNAL MEDICINE

## 2025-03-27 PROCEDURE — 80048 BASIC METABOLIC PNL TOTAL CA: CPT | Performed by: INTERNAL MEDICINE

## 2025-03-27 PROCEDURE — 96375 TX/PRO/DX INJ NEW DRUG ADDON: CPT

## 2025-03-27 PROCEDURE — 94760 N-INVAS EAR/PLS OXIMETRY 1: CPT

## 2025-03-27 PROCEDURE — 84100 ASSAY OF PHOSPHORUS: CPT | Performed by: INTERNAL MEDICINE

## 2025-03-27 PROCEDURE — 90935 HEMODIALYSIS ONE EVALUATION: CPT | Performed by: INTERNAL MEDICINE

## 2025-03-27 RX ORDER — ALBUMIN (HUMAN) 12.5 G/50ML
25 SOLUTION INTRAVENOUS
Status: DISCONTINUED | OUTPATIENT
Start: 2025-03-27 | End: 2025-03-27

## 2025-03-27 NOTE — PROGRESS NOTES
[facility]   part of [facility]    Nephrology Progress Note    Godwin Fonseca Attending:  Miguel Garsia DO       SUBJECTIVE:     Underwent HD but was unable to tolerate UF due to hypotension, got albumin. He received his antihypertensives pre HD.    He feels he needs HD today, endorses SOB but no leg swelling.    PHYSICAL EXAM:     Vital Signs: BP (!) 136/101 (BP Location: Right arm)   Pulse 85   Temp 97.8 °F (36.6 °C) (Oral)   Resp 17   Wt 242 lb 4.6 oz (109.9 kg)   SpO2 97%   BMI 31.11 kg/m²   Temp (24hrs), Av °F (36.7 °C), Min:97.7 °F (36.5 °C), Max:98.2 °F (36.8 °C)       Intake/Output Summary (Last 24 hours) at 3/27/2025 1044  Last data filed at 3/27/2025 0801  Gross per 24 hour   Intake 0 ml   Output 700 ml   Net -700 ml     Wt Readings from Last 3 Encounters:   25 242 lb 4.6 oz (109.9 kg)   25 257 lb (116.6 kg)   25 241 lb 11.2 oz (109.6 kg)     General: appears fatigued, uncomfortable  Skin: no visible rashes  HEENT: NCAT  Cardiac: Regular rate and rhythm, no murmur/gallop or rub  Lungs: crackles at bases  Abdomen: Soft, NTND  Extremities: warm, well perfused, no leg edema  Neurologic/Psych: mentating well, no asterixis  LUE AVF + thrill and bruit       LABORATORY DATA:     Lab Results   Component Value Date    GLU 98 2025    BUN 37 (H) 2025    BUNCREA 13.0 2022    CREATSERUM 6.56 (H) 2025    ANIONGAP 10 2025    GFR 59 (L) 2018    GFRNAA 30 (L) 2022    GFRAA 35 (L) 2022    CA 8.7 2025    OSMOCALC 301 (H) 2025    ALKPHO 108 2025    AST 51 (H) 2025    ALT 57 (H) 2025    BILT 0.5 2025    TP 7.0 2025    ALB 3.8 2025    GLOBULIN 3.2 2025     2025    K 3.6 2025     2025    CO2 27.0 2025     Lab Results   Component Value Date    WBC 4.6 2025    RBC 2.40 (L) 2025    HGB 7.7 (L) 2025    HCT 22.1 (L) 2025    .0  03/27/2025    MPV 11.5 12/14/2012    MCV 92.1 03/27/2025    MCH 32.1 03/27/2025    MCHC 34.8 03/27/2025    RDW 13.8 03/27/2025    NEPRELIM 2.53 03/27/2025    NEPERCENT 55.5 03/27/2025    LYPERCENT 25.2 03/27/2025    MOPERCENT 12.5 03/27/2025    EOPERCENT 4.8 03/27/2025    BAPERCENT 2.0 03/27/2025    NE 2.53 03/27/2025    LYMABS 1.15 03/27/2025    MOABSO 0.57 03/27/2025    EOABSO 0.22 03/27/2025    BAABSO 0.09 03/27/2025     Lab Results   Component Value Date    MALBP 167.00 05/24/2023    CREUR 142.00 05/24/2023    CREUR 142.00 05/24/2023     Lab Results   Component Value Date    COLORUR Light-Yellow 11/17/2024    CLARITY Clear 11/17/2024    SPECGRAVITY 1.013 11/17/2024    GLUUR Normal 11/17/2024    BILUR Negative 11/17/2024    KETUR Negative 11/17/2024    BLOODURINE Negative 11/17/2024    PHURINE 8.5 (H) 11/17/2024    PROUR 100 (A) 11/17/2024    UROBILINOGEN Normal 11/17/2024    NITRITE Negative 11/17/2024    LEUUR Negative 11/17/2024    WBCUR 1-5 11/17/2024    RBCUR 0-2 11/17/2024    EPIUR Few (A) 11/17/2024    BACUR Rare (A) 11/17/2024    CAOXUR Occasional (A) 04/20/2018    HYLUR Present (A) 05/14/2019         IMAGING:     Reviewed.    MEDICATIONS:       Current Facility-Administered Medications   Medication Dose Route Frequency    sodium chloride 0.9 % IV bolus 100 mL  100 mL Intravenous Q30 Min PRN    And    albumin human (Albumin) 25% injection 25 g  25 g Intravenous PRN Dialysis    acetaminophen (Tylenol Extra Strength) tab 500 mg  500 mg Oral Q4H PRN    melatonin tab 3 mg  3 mg Oral Nightly PRN    ondansetron (Zofran) 4 MG/2ML injection 4 mg  4 mg Intravenous Q6H PRN    prochlorperazine (Compazine) 10 MG/2ML injection 5 mg  5 mg Intravenous Q8H PRN    polyethylene glycol (PEG 3350) (Miralax) 17 g oral packet 17 g  17 g Oral Daily PRN    sennosides (Senokot) tab 17.2 mg  17.2 mg Oral Nightly PRN    bisacodyl (Dulcolax) 10 MG rectal suppository 10 mg  10 mg Rectal Daily PRN    hydrALAzine (Apresoline) 20 mg/mL  injection 10 mg  10 mg Intravenous Q6H PRN    acetaminophen (Tylenol Extra Strength) tab 500 mg  500 mg Oral Q6H PRN    albuterol (Ventolin HFA) 108 (90 Base) MCG/ACT inhaler 2 puff  2 puff Inhalation Q6H PRN    atorvastatin (Lipitor) tab 20 mg  20 mg Oral QPM    benzonatate (Tessalon) cap 200 mg  200 mg Oral TID PRN    buPROPion ER (Wellbutrin XL) 24 hr tab 150 mg  150 mg Oral Daily    calcium acetate (Phoslo) cap 667 mg  667 mg Oral TID with meals    carvedilol (Coreg) tab 25 mg  25 mg Oral BID with meals    cloNIDine (Catapres) tab 0.1 mg  0.1 mg Oral BID    FLUoxetine (PROzac) cap 40 mg  40 mg Oral Daily    fluticasone propionate (Flonase) 50 MCG/ACT nasal suspension 1 spray  1 spray Each Nare Daily    gabapentin (Neurontin) cap 200 mg  200 mg Oral TID    hydrALAZINE (Apresoline) tab 25 mg  25 mg Oral TID    HYDROcodone-acetaminophen (Norco) 5-325 MG per tab 1 tablet  1 tablet Oral Q6H PRN    lamoTRIgine (LaMICtal) tab 100 mg  100 mg Oral Daily    losartan (Cozaar) tab 100 mg  100 mg Oral Daily    pantoprazole (Protonix) DR tab 40 mg  40 mg Oral QAM AC    phenylephrine-min oil-noe (Formula R) 0.25-14-74.9 % rectal ointment 1 g  1 g Rectal QID PRN    sevelamer carbonate (Renvela) tab 2,400 mg  2,400 mg Oral TID CC    amphetamine-dextroamphetamine (Adderall) tab 10 mg  10 mg Oral BID AC    ARIPiprazole (Abilify) tab 15 mg  15 mg Oral Daily    amoxicillin clavulanate (Augmentin) 500-125 MG per tab 500 mg  500 mg Oral Q24H    sodium chloride 0.9 % IV bolus 100 mL  100 mL Intravenous Q30 Min PRN    And    albumin human (Albumin) 25% injection 25 g  25 g Intravenous PRN Dialysis    NIFEdipine ER (Procardia-XL) 24 hr tab 30 mg  30 mg Oral Daily    tamsulosin (Flomax) cap 0.4 mg  0.4 mg Oral Nightly       ASSESSMENT/PLAN:     45 yo M with history of ESRD on iHD MWF via LUE AVF, HTN, severe MR s/p MVR x 2, HFpEF, DVT/PE, TIA, MGUS, bipolar disorder, recurrent GI bleed, diffuse body pains presents with rectal bleeding  again.     ESRD:  -- HD today 2-3L UF as tolerated  -- avoid morphine  -- gabapentin should be renally dosed - defer to RPh/primary service     Anemia:  -- in the setting of bleeding/ESRD  -- epo with HD     MBD:  -- sevelamer 2400 TID AC  -- calcium acetate 667 TIDAC  -- low phos diet     HTN:  -- coreg and losartan pre-HD if SBP>180, otherwise hold  -- clonidine should be continued BID to avoid rebound  -- takes remaining antihypertensives after HD unless SBP>180 per above     LUE AVF:  -- L arm precautions     Rectal bleeding:  -- colonoscopy with pan-diverticulosis, hemorrhoids and polyps recently     D/w RN, will follow.      Thank you for allowing me to participate in the care of this patient. Please do not hesitate to call with questions or concerns.        Lenka Bond MD  [facility]

## 2025-03-27 NOTE — CONSULTS
[facility]  Report of Consultation    Godwin Fonseca Patient Status:  Observation    1980 MRN [id number]   Location [facility] 8NE-A Attending Miguel Garsia, DO   Hosp Day # 0 PCP Adrian Small MD     Reason for Consultation:  Rectal bleeding    History of Present Illness:  Godwin Fonseca is a a(n) 44 year old male.  Patient has a history of intermittent rectal bleeding underwent colonoscopy 2 weeks ago.  Has a history of hemorrhoids.  Has not seen a surgeon up to this point for this problem.  Is scheduled to see someone at [facility] in 2 months.  Admitted with syncope.  End-stage renal disease.  Bleeding has subsided.    History:  Past Medical History:    Anxiety state    Arrhythmia    Asthma ([facility])    Attention deficit hyperactivity disorder (ADHD)    Back problem    Bipolar 1 disorder (HCC)    CKD (chronic kidney disease) stage 3, GFR 30-59 ml/min ([facility])    Dr Meeks    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[facility])    Congestive heart disease (HCC)    COPD (chronic obstructive pulmonary disease) ([facility])    Coronary atherosclerosis    Deep vein thrombosis ([facility])    at age 17 R/T cast    Depression    Diabetes ([facility])    Dialysis patient    Diverticulosis of large intestine    Essential hypertension    3/21 echo: severe concentric LVH with normal EF and no MR or pHTN    Extrinsic asthma, unspecified    Heart attack (HCC)    - angiogram- no intervention    Heart valve disease    mitral valve repair in /    High blood pressure    High cholesterol    History of blood transfusion    History of mitral valve repair    Hyperlipidemia    Low back pain    tight and stiff after sweeping and mopping    LVH (left ventricular hypertrophy)    Migraines    Mixed hyperlipidemia     HDL 38 LDL 97 VLDL 57     Monoclonal gammopathy    IgG kappa     Muscle weakness    MVP (mitral valve prolapse)    Repair  at [place]; echoes as recently as 3/21 show mild or trivial MR and no stenosis    Neuropathy     Osteoarthritis    hip ,knees    Pneumonia due to organism    Pulmonary embolism (HCC)    Renal disorder    Stroke (HCC)    TIA (transient ischemic attack)    Initial history of left-sided weakness and slurred speech. (+) cocaine. MRI of the brain, CT angiogram of the head and neck, and 2D echo are all unremarkable.     TMJ (dislocation of temporomandibular joint)    Troponin level elevated    Trop 60 60 47 with TIA and no CP: Lexiscan negative with EF 51    Visual impairment     Past Surgical History:   Procedure Laterality Date    Av fistula revision, open Left     Cabg      Colonoscopy N/A 03/26/2023    Procedure: COLONOSCOPY;  Surgeon: Heath Vu MD;  Location:  ENDOSCOPY    Colonoscopy N/A 12/30/2023    Procedure: COLONOSCOPY with cold snare polypectomy and forcep polypectomy;  Surgeon: Ousmane Suarez MD;  Location:  ENDOSCOPY    Colonoscopy N/A 3/9/2025    Procedure: COLONOSCOPY WITH COLD SNARE POLYPECTOMY AND ENDO CLIPS X 2;  Surgeon: Bakari Noguera MD;  Location:  ENDOSCOPY    Colonoscopy & polypectomy  2019    Egd  2019    Duodenitis. Biopsied. EUS for weight loss was negative    Heart surgery      Hernia surgery  08/17/2022    Dr Barnes    Laminectomy,>2 sgmt,lumbar  09/06/2018    L4-L5 Decomp Discectomy ROEM L4-L5    Mitralplasty w cp bypass  1994    [facility]iano: Repair    Repair rotator cuff,chronic Left     torn and had a ruptured bicep    Sinus surgery        Spine surgery procedure unlisted      Valve repair  1994    mitral valve     Family History   Problem Relation Age of Onset    Hypertension Father     Alcohol and Other Disorders Associated Father     Substance Abuse Father         cocaine    Dementia Father     Cancer Father         lung    Diabetes Mother     Cancer Mother         multiple myeloma    Hypertension Mother     Anxiety Maternal Aunt     Depression Maternal Aunt     Anxiety Maternal Aunt     Depression Maternal Aunt     Bipolar Disorder Maternal Aunt     Diabetes Maternal  Grandmother     Hypertension Maternal Grandmother     Cancer Maternal Grandfather         stomach cancer    Diabetes Maternal Grandfather     Hypertension Maternal Grandfather     Alcohol and Other Disorders Associated Maternal Grandfather     Hypertension Paternal Grandmother     Hypertension Paternal Grandfather     Cancer Sister         uterine and ovarian    Hypertension Sister     Cancer Maternal Uncle         lung    Cancer Paternal Aunt         throat      reports that he quit smoking about 3 years ago. His smoking use included cigarettes. He started smoking about 30 years ago. He has a 27 pack-year smoking history. He has never been exposed to tobacco smoke. He has never used smokeless tobacco. He reports that he does not drink alcohol and does not use drugs.C.    Allergies:  Allergies[1]    Medications:    Current Facility-Administered Medications:     acetaminophen (Tylenol Extra Strength) tab 500 mg, 500 mg, Oral, Q4H PRN    melatonin tab 3 mg, 3 mg, Oral, Nightly PRN    ondansetron (Zofran) 4 MG/2ML injection 4 mg, 4 mg, Intravenous, Q6H PRN    prochlorperazine (Compazine) 10 MG/2ML injection 5 mg, 5 mg, Intravenous, Q8H PRN    polyethylene glycol (PEG 3350) (Miralax) 17 g oral packet 17 g, 17 g, Oral, Daily PRN    sennosides (Senokot) tab 17.2 mg, 17.2 mg, Oral, Nightly PRN    bisacodyl (Dulcolax) 10 MG rectal suppository 10 mg, 10 mg, Rectal, Daily PRN    hydrALAzine (Apresoline) 20 mg/mL injection 10 mg, 10 mg, Intravenous, Q6H PRN    acetaminophen (Tylenol Extra Strength) tab 500 mg, 500 mg, Oral, Q6H PRN    albuterol (Ventolin HFA) 108 (90 Base) MCG/ACT inhaler 2 puff, 2 puff, Inhalation, Q6H PRN    atorvastatin (Lipitor) tab 20 mg, 20 mg, Oral, QPM    benzonatate (Tessalon) cap 200 mg, 200 mg, Oral, TID PRN    buPROPion ER (Wellbutrin XL) 24 hr tab 150 mg, 150 mg, Oral, Daily    calcium acetate (Phoslo) cap 667 mg, 667 mg, Oral, TID with meals    carvedilol (Coreg) tab 25 mg, 25 mg, Oral, BID with  meals    cloNIDine (Catapres) tab 0.1 mg, 0.1 mg, Oral, BID    FLUoxetine (PROzac) cap 40 mg, 40 mg, Oral, Daily    fluticasone propionate (Flonase) 50 MCG/ACT nasal suspension 1 spray, 1 spray, Each Nare, Daily    gabapentin (Neurontin) cap 200 mg, 200 mg, Oral, TID    hydrALAZINE (Apresoline) tab 25 mg, 25 mg, Oral, TID    HYDROcodone-acetaminophen (Norco) 5-325 MG per tab 1 tablet, 1 tablet, Oral, Q6H PRN    lamoTRIgine (LaMICtal) tab 100 mg, 100 mg, Oral, Daily    losartan (Cozaar) tab 100 mg, 100 mg, Oral, Daily    pantoprazole (Protonix) DR tab 40 mg, 40 mg, Oral, QAM AC    phenylephrine-min oil-noe (Formula R) 0.25-14-74.9 % rectal ointment 1 g, 1 g, Rectal, QID PRN    sevelamer carbonate (Renvela) tab 2,400 mg, 2,400 mg, Oral, TID CC    amphetamine-dextroamphetamine (Adderall) tab 10 mg, 10 mg, Oral, BID AC    ARIPiprazole (Abilify) tab 15 mg, 15 mg, Oral, Daily    amoxicillin clavulanate (Augmentin) 500-125 MG per tab 500 mg, 500 mg, Oral, Q24H    sodium chloride 0.9 % IV bolus 100 mL, 100 mL, Intravenous, Q30 Min PRN **AND** albumin human (Albumin) 25% injection 25 g, 25 g, Intravenous, PRN Dialysis    NIFEdipine ER (Procardia-XL) 24 hr tab 30 mg, 30 mg, Oral, Daily    tamsulosin (Flomax) cap 0.4 mg, 0.4 mg, Oral, Nightly    Review of Systems:    GENERAL HEALTH: otherwise feels well, no weight loss, no fever or chills  SKIN: denies any unusual skin rashes or jaundice  HEENT: denies nasal congestion, sinus pain or sore throat; hearing loss negative  RESPIRATORY: denies shortness of breath, wheezing or cough   CARDIOVASCULAR: denies chest pain or RAMSAY; no palpitations   GI: denies nausea, vomiting, constipation, diarrhea; no rectal bleeding; no heartburn  GENITAL/: no dysuria, urgency or frequency, no tea colored urine  MUSCULOSKELETAL: no joint complaints upper or lower extremities  HEMATOLOGY: denies hx anemia; denies bruising or excessive bleeding    Physical Exam:  Blood pressure 127/82, pulse 98,  temperature 98.1 °F (36.7 °C), temperature source Oral, resp. rate 19, weight 242 lb 4.6 oz (109.9 kg), SpO2 97%.    General: Alert, orientated x3.  Cooperative.  No apparent distress.  HEENT: Exam is unremarkable.  Without scleral icterus.  Mucous membranes are moist. Oropharynx is clear.  Lungs: Clear to auscultation bilaterally.  Cardiac: Regular rate and rhythm. No murmur.  Abdomen: Benign  Extremities:  No lower extremity edema noted.  Without clubbing or cyanosis.    Skin: Normal texture and turgor.  Neurologic: Cranial nerves are grossly intact.  Motor strength and sensory examination is grossly normal.  No focal neurologic deficit.  Hemorrhoids present on exam    Laboratory Data:  Lab Results   Component Value Date    WBC 4.6 03/27/2025    HGB 7.7 03/27/2025    HCT 22.1 03/27/2025    .0 03/27/2025    CREATSERUM 6.56 03/27/2025    BUN 37 03/27/2025     03/27/2025    K 3.6 03/27/2025     03/27/2025    CO2 27.0 03/27/2025    GLU 98 03/27/2025    CA 8.7 03/27/2025       Impression and Plan:  Multiple medical problems, history of hemorrhoids with bleeding status post colonoscopy without other abnormality.  Will have him follow-up with Dr. Mckinley Mccrary as outpatient.  No need for immediate intervention      Patrick Barnes MD  3/27/2025  7:53 AM         [1]   Allergies  Allergen Reactions    Hydrochlorothiazide RASH and HIVES

## 2025-03-27 NOTE — PLAN OF CARE
Received patient at 0730. Patient alert and oriented x4. Tele Rhythm NSR - ST.  O2 sats on RA. Lungs diminished. Bed is locked and low position. Call light & personal belongings within reach. C/O ABD  pain at this time, Norco PRN given.  Skin dry and intact. Reviewed plan of care with patient and verbalized understanding.     POC: Nephro and GI following  Dialysis again today - AM BP meds held per parameters  Monitor BP  Gen surg rec OP intervention     Problem: CARDIOVASCULAR - ADULT  Goal: Maintains optimal cardiac output and hemodynamic stability  Description: INTERVENTIONS:- Monitor vital signs, rhythm, and trends- Monitor for bleeding, hypotension and signs of decreased cardiac output- Evaluate effectiveness of vasoactive medications to optimize hemodynamic stability- Monitor arterial and/or venous puncture sites for bleeding and/or hematoma- Assess quality of pulses, skin color and temperature- Assess for signs of decreased coronary artery perfusion - ex. Angina- Evaluate fluid balance, assess for edema, trend weights  Outcome: Progressing  Goal: Absence of cardiac arrhythmias or at baseline  Description: INTERVENTIONS:- Continuous cardiac monitoring, monitor vital signs, obtain 12 lead EKG if indicated- Evaluate effectiveness of antiarrhythmic and heart rate control medications as ordered- Initiate emergency measures for life threatening arrhythmias- Monitor electrolytes and administer replacement therapy as ordered  Outcome: Progressing

## 2025-03-27 NOTE — PLAN OF CARE
Received patient at 1930. Patient is A&O x 4. Reports generalized pain, narco administered.  Standby assist with ambulation. Diminished lung sound. On 1 L O2 nasal canula. NSR on tele monitor. Arm precaution in place- left arm fistula .Skin is dry intact. Bed in low position and call light within reach. Reviewed plan of care and patient verbalizes understanding.     Problem: Patient/Family Goals  Goal: Patient/Family Long Term Goal  Description: Patient's Long Term Goal: Discharge home  Interventions:- Medication adherence  See additional Care Plan goals for specific interventions  3/26/2025 2356 by David Jennings RN  Outcome: Progressing  3/26/2025 2355 by David Jennings RN  Outcome: Progressing  Goal: Patient/Family Short Term Goal  Description: Patient's Short Term Goal: stay safe  interventions: - call light within reach  See additional Care Plan goals for specific interventions  3/26/2025 2356 by David Jennings RN  Outcome: Progressing  3/26/2025 2355 by David Jennings RN  Outcome: Progressing     Problem: CARDIOVASCULAR - ADULT  Goal: Maintains optimal cardiac output and hemodynamic stability  Description: INTERVENTIONS:- Monitor vital signs, rhythm, and trends- Monitor for bleeding, hypotension and signs of decreased cardiac output- Evaluate effectiveness of vasoactive medications to optimize hemodynamic stability- Monitor arterial and/or venous puncture sites for bleeding and/or hematoma- Assess quality of pulses, skin color and temperature- Assess for signs of decreased coronary artery perfusion - ex. Angina- Evaluate fluid balance, assess for edema, trend weights  3/26/2025 2356 by David Jennings RN  Outcome: Progressing  3/26/2025 2355 by David Jennings RN  Outcome: Progressing  Goal: Absence of cardiac arrhythmias or at baseline  Description: INTERVENTIONS:- Continuous cardiac monitoring, monitor vital signs, obtain 12 lead EKG if indicated- Evaluate effectiveness of antiarrhythmic and heart rate control  medications as ordered- Initiate emergency measures for life threatening arrhythmias- Monitor electrolytes and administer replacement therapy as ordered  3/26/2025 2356 by David Jennings RN  Outcome: Progressing  3/26/2025 2355 by David Jennings RN  Outcome: Progressing     Problem: RESPIRATORY - ADULT  Goal: Achieves optimal ventilation and oxygenation  Description: INTERVENTIONS:- Assess for changes in respiratory status- Assess for changes in mentation and behavior- Position to facilitate oxygenation and minimize respiratory effort- Oxygen supplementation based on oxygen saturation or ABGs- Provide Smoking Cessation handout, if applicable- Encourage broncho-pulmonary hygiene including cough, deep breathe, Incentive Spirometry- Assess the need for suctioning and perform as needed- Assess and instruct to report SOB or any respiratory difficulty- Respiratory Therapy support as indicated- Manage/alleviate anxiety- Monitor for signs/symptoms of CO2 retention  3/26/2025 2356 by David Jennings RN  Outcome: Progressing  3/26/2025 2355 by David Jennings RN  Outcome: Progressing     Problem: METABOLIC/FLUID AND ELECTROLYTES - ADULT  Goal: Electrolytes maintained within normal limits  Description: INTERVENTIONS:- Monitor labs and rhythm and assess patient for signs and symptoms of electrolyte imbalances- Administer electrolyte replacement as ordered- Monitor response to electrolyte replacements, including rhythm and repeat lab results as appropriate- Fluid restriction as ordered- Instruct patient on fluid and nutrition restrictions as appropriate  3/26/2025 2356 by David Jennings RN  Outcome: Progressing  3/26/2025 2355 by David Jennings RN  Outcome: Progressing     Problem: Diabetes/Glucose Control  Goal: Glucose maintained within prescribed range  Description: INTERVENTIONS:- Monitor Blood Glucose as ordered- Assess for signs and symptoms of hyperglycemia and hypoglycemia- Administer ordered medications to maintain  glucose within target range- Assess barriers to adequate nutritional intake and initiate nutrition consult as needed- Instruct patient on self management of diabetes  3/26/2025 2356 by David Jennings, RN  Outcome: Progressing  3/26/2025 2355 by David Jennings, RN  Outcome: Progressing

## 2025-03-27 NOTE — DISCHARGE SUMMARY
[facility] Hospitalist Discharge Summary     Patient ID:  Godwin Fonseca  44 year old  4/12/1980    Admit date: 3/26/2025  Discharge date and time:  3/27/25  Attending Physician: Miguel Garsia DO   Primary Care Physician: Adrian Small MD   Discharge Diagnoses: Syncope, vasovagal [R55]  Rectal bleeding [K62.5]  SOB (shortness of breath) [R06.02]  Non-compliance with renal dialysis [Z91.158]  Hypertension, unspecified type [I10]    Discharge Condition: Stable      Disposition:  Home    Follow up:   -PCP and general surgery as advised     Hospital Course:   Patient is a 44 year old male ESRD on MWF HD, hemorrhoids, diverticulosis, hypertension and chronic anemia presented for lightheadedness and presyncope.  Patient with recent hospital admission for pneumonia and rectal bleeding was discharged on oral antibiotics after evaluation of GI for chronic hemorrhoids.  Patient states recently, he has been more weak with increased rectal bleeding.  States he missed his dialysis session on Monday due to the symptoms.  States he felt very lightheaded and almost had a syncopal episode.  Denies LOC but he did fall.  No fevers or chills.     In the ER, hemodynamically stable.  CT head for acute findings.  CXR with persistent right sided lobar pneumonia.  Labs with Hgb 8 (near baseline of recent discharge), BMP with labs consistent with ESRD, troponin 709 and markedly elevated proBNP.  Patient admitted for further management.  #Rectal bleeding 2/2 chronic hemorrhoids  #Presyncope- likely vasovagal  -Hgb stable on admission --8  -Monitor hemoglobin, supportive care as required.  -Evaluated by GI and general surgery-- no acute inpatient intervention required. Will arrange follow up outpatient for elective hemorrhoidectomy.  Patient is agreeable. D/w bedside RN.      #Lobar pneumonia, recently ddx  #Acute hypoxic respiratory failure  -weaned to RA, completed augmentin course today  -Supportive care as required.     #ESRD  -HD management  per nephrology.     #Uncontrolled HTN  -Resume home antihypertensives     #Bipolar  #HFpEF  -Resume home meds accordingly     Exam on Day of DC:  BP (!) 154/94 (BP Location: Right arm)   Pulse 90   Temp 97.7 °F (36.5 °C) (Oral)   Resp 16   Wt 242 lb 4.6 oz (109.9 kg)   SpO2 98%   BMI 31.11 kg/m²   General: No distress.  Alert and oriented.  HEENT:  EOMI, PERRLA.  Pulm: Diminished right-sided breath sounds.  Normal respiratory effort.  CV: Regular rate and rhythm, no murmur.   Abd: Soft, diffusely tender, nondistended.  MSK: Full range of motion in extremities, no obvious deformities.    Skin: No lesions or rashes.  Neuro: No obvious focal deficits.    I as the attending physician reconciled the current and discharge medications on day of discharge.     Current Discharge Medication List        CONTINUE these medications which have NOT CHANGED    Details   HYDROcodone-acetaminophen 5-325 MG Oral Tab Take 1 tablet by mouth every 6 (six) hours as needed for Pain.      pantoprazole 40 MG Oral Tab EC Take 1 tablet (40 mg total) by mouth every morning before breakfast.      benzonatate 200 MG Oral Cap Take 1 capsule (200 mg total) by mouth 3 (three) times daily as needed for cough.      polyethylene glycol, PEG 3350, 17 g Oral Powd Pack Take 17 g by mouth daily as needed.      Phenylephrine HCl (PREPARATION H) 0.25 % Rectal Suppos Place 1 suppository rectally 4 (four) times daily as needed (Hemorrhoids after bowel movements).      atorvastatin 20 MG Oral Tab Take 1 tablet (20 mg total) by mouth every evening.      hydrALAZINE 25 MG Oral Tab Take 1 tablet (25 mg total) by mouth 3 (three) times daily.      cloNIDine 0.1 MG Oral Tab Take 1 tablet (0.1 mg total) by mouth 2 (two) times daily.      NIFEdipine ER 30 MG Oral Tablet 24 Hr Take 1 tablet (30 mg total) by mouth daily.      sevelamer carbonate 800 MG Oral Tab Take 3 tablets (2,400 mg total) by mouth 3 (three) times daily with meals.      calcium acetate 667 MG  Oral Cap Take 1 capsule (667 mg total) by mouth with breakfast, with lunch, and with evening meal.      !! ARIPiprazole 5 MG Oral Tab Take 1 tablet (5 mg total) by mouth daily. Take 5mg (1 tablet) by mouth daily. Take each dose with 1 tablet of 10mg aripiprazole for a total daily dose of 15mg aripiprazole.      !! ARIPiprazole 10 MG Oral Tab Take 1 tablet (10 mg total) by mouth daily. Take 10mg (1 tablet) by mouth daily. Take each dose with 1 tablet of 5mg aripiprazole for a total daily dose of 15mg aripiprazole.      ergocalciferol 1.25 MG (88497 UT) Oral Cap Take 1 capsule (50,000 Units total) by mouth once a week. Every Monday.      losartan 100 MG Oral Tab Take 1 tablet (100 mg total) by mouth daily.      ondansetron 4 MG Oral Tablet Dispersible Take 1 tablet (4 mg total) by mouth every 8 (eight) hours as needed for Nausea.      gabapentin 100 MG Oral Cap Take 2 capsules (200 mg total) by mouth 3 (three) times daily.      VYVANSE 60 MG Oral Cap Take 1 capsule (60 mg total) by mouth every morning.      lamoTRIgine 100 MG Oral Tab Take 1 tablet (100 mg total) by mouth daily.      albuterol 108 (90 Base) MCG/ACT Inhalation Aero Soln Inhale 2 puffs into the lungs every 6 (six) hours as needed for Wheezing.      tamsulosin 0.4 MG Oral Cap Take 1 capsule (0.4 mg total) by mouth daily.      buPROPion  MG Oral Tablet 24 Hr Take 1 tablet (150 mg total) by mouth daily.      FLUoxetine HCl 40 MG Oral Cap Take 1 capsule (40 mg total) by mouth daily.      carvedilol 25 MG Oral Tab Take 1 tablet (25 mg total) by mouth in the morning and 1 tablet (25 mg total) in the evening. Take with meals.      Fluticasone Propionate 50 MCG/ACT Nasal Suspension SPRAY ONCE INTO EACH NOSTRIL BID PRN      HYDROcodone-acetaminophen  MG Oral Tab Take 1 tablet by mouth every 6 (six) hours as needed for Pain.       !! - Potential duplicate medications found. Please discuss with provider.        STOP taking these medications        amoxicillin clavulanate 500-125 MG Oral Tab              Activity: activity as tolerated  Diet: renal diet  Wound Care: as directed  Code Status: Full Code    Total time coordinating care for discharge: Greater than 32 minutes    DO Kevin Peck [facility]

## 2025-03-27 NOTE — PLAN OF CARE
Pt okay to discharge per primary and consults. IV removed. Discharge instructions and education went over with patient & and verbalized understanding. Patient transport by wheelchair.

## 2025-03-29 ENCOUNTER — APPOINTMENT (OUTPATIENT)
Dept: GENERAL RADIOLOGY | Age: 45
End: 2025-03-29
Attending: PHYSICIAN ASSISTANT
Payer: MEDICARE

## 2025-03-29 ENCOUNTER — APPOINTMENT (OUTPATIENT)
Dept: CT IMAGING | Age: 45
End: 2025-03-29
Attending: EMERGENCY MEDICINE
Payer: MEDICARE

## 2025-03-29 ENCOUNTER — HOSPITAL ENCOUNTER (OUTPATIENT)
Facility: HOSPITAL | Age: 45
Setting detail: OBSERVATION
Discharge: HOME | End: 2025-03-30
Attending: EMERGENCY MEDICINE | Admitting: STUDENT IN AN ORGANIZED HEALTH CARE EDUCATION/TRAINING PROGRAM
Payer: MEDICARE

## 2025-03-29 DIAGNOSIS — R10.9 LEFT SIDED ABDOMINAL PAIN: ICD-10-CM

## 2025-03-29 DIAGNOSIS — R07.9 CHEST PAIN OF UNCERTAIN ETIOLOGY: Primary | ICD-10-CM

## 2025-03-29 DIAGNOSIS — R06.02 SOB (SHORTNESS OF BREATH): ICD-10-CM

## 2025-03-29 LAB
ALBUMIN SERPL-MCNC: 4.2 G/DL (ref 3.2–4.8)
ALBUMIN/GLOB SERPL: 1.2 {RATIO} (ref 1–2)
ALP LIVER SERPL-CCNC: 107 U/L
ALT SERPL-CCNC: 46 U/L
ANION GAP SERPL CALC-SCNC: 12 MMOL/L (ref 0–18)
APTT PPP: 28.7 SECONDS (ref 23–36)
AST SERPL-CCNC: 40 U/L (ref ?–34)
BASOPHILS # BLD AUTO: 0.13 X10(3) UL (ref 0–0.2)
BASOPHILS NFR BLD AUTO: 1.7 %
BILIRUB SERPL-MCNC: 0.6 MG/DL (ref 0.3–1.2)
BUN BLD-MCNC: 56 MG/DL (ref 9–23)
CALCIUM BLD-MCNC: 9.6 MG/DL (ref 8.7–10.6)
CHLORIDE SERPL-SCNC: 106 MMOL/L (ref 98–112)
CO2 SERPL-SCNC: 22 MMOL/L (ref 21–32)
CREAT BLD-MCNC: 8.33 MG/DL
D DIMER PPP FEU-MCNC: 2.4 UG/ML FEU (ref ?–0.5)
EGFRCR SERPLBLD CKD-EPI 2021: 7 ML/MIN/1.73M2 (ref 60–?)
EOSINOPHIL # BLD AUTO: 0.31 X10(3) UL (ref 0–0.7)
EOSINOPHIL NFR BLD AUTO: 4.1 %
ERYTHROCYTE [DISTWIDTH] IN BLOOD BY AUTOMATED COUNT: 14.9 %
GLOBULIN PLAS-MCNC: 3.4 G/DL (ref 2–3.5)
GLUCOSE BLD-MCNC: 140 MG/DL (ref 70–99)
HCT VFR BLD AUTO: 24.3 %
HGB BLD-MCNC: 8.5 G/DL
IMM GRANULOCYTES # BLD AUTO: 0.03 X10(3) UL (ref 0–1)
IMM GRANULOCYTES NFR BLD: 0.4 %
INR BLD: 1.08 (ref 0.8–1.2)
LIPASE SERPL-CCNC: 74 U/L (ref 12–53)
LYMPHOCYTES # BLD AUTO: 1.29 X10(3) UL (ref 1–4)
LYMPHOCYTES NFR BLD AUTO: 16.9 %
MAGNESIUM SERPL-MCNC: 2.2 MG/DL (ref 1.6–2.6)
MCH RBC QN AUTO: 33.2 PG (ref 26–34)
MCHC RBC AUTO-ENTMCNC: 35 G/DL (ref 31–37)
MCV RBC AUTO: 94.9 FL
MONOCYTES # BLD AUTO: 0.85 X10(3) UL (ref 0.1–1)
MONOCYTES NFR BLD AUTO: 11.2 %
NEUTROPHILS # BLD AUTO: 5.01 X10 (3) UL (ref 1.5–7.7)
NEUTROPHILS # BLD AUTO: 5.01 X10(3) UL (ref 1.5–7.7)
NEUTROPHILS NFR BLD AUTO: 65.7 %
NT-PROBNP SERPL-MCNC: ABNORMAL PG/ML (ref ?–125)
OSMOLALITY SERPL CALC.SUM OF ELEC: 308 MOSM/KG (ref 275–295)
PLATELET # BLD AUTO: 224 10(3)UL (ref 150–450)
POCT INFLUENZA A: NEGATIVE
POCT INFLUENZA B: NEGATIVE
POTASSIUM SERPL-SCNC: 3.9 MMOL/L (ref 3.5–5.1)
PROT SERPL-MCNC: 7.6 G/DL (ref 5.7–8.2)
PROTHROMBIN TIME: 13.8 SECONDS (ref 11.6–14.8)
RBC # BLD AUTO: 2.56 X10(6)UL
SARS-COV-2 RNA RESP QL NAA+PROBE: NOT DETECTED
SODIUM SERPL-SCNC: 140 MMOL/L (ref 136–145)
TROPONIN I SERPL HS-MCNC: 332 NG/L
TROPONIN I SERPL HS-MCNC: 337 NG/L
WBC # BLD AUTO: 7.6 X10(3) UL (ref 4–11)

## 2025-03-29 PROCEDURE — 83690 ASSAY OF LIPASE: CPT | Performed by: PHYSICIAN ASSISTANT

## 2025-03-29 PROCEDURE — 96374 THER/PROPH/DIAG INJ IV PUSH: CPT

## 2025-03-29 PROCEDURE — 96376 TX/PRO/DX INJ SAME DRUG ADON: CPT

## 2025-03-29 PROCEDURE — 85025 COMPLETE CBC W/AUTO DIFF WBC: CPT | Performed by: PHYSICIAN ASSISTANT

## 2025-03-29 PROCEDURE — 96372 THER/PROPH/DIAG INJ SC/IM: CPT

## 2025-03-29 PROCEDURE — 87502 INFLUENZA DNA AMP PROBE: CPT | Performed by: PHYSICIAN ASSISTANT

## 2025-03-29 PROCEDURE — 93005 ELECTROCARDIOGRAM TRACING: CPT

## 2025-03-29 PROCEDURE — 99285 EMERGENCY DEPT VISIT HI MDM: CPT

## 2025-03-29 PROCEDURE — 71045 X-RAY EXAM CHEST 1 VIEW: CPT | Performed by: PHYSICIAN ASSISTANT

## 2025-03-29 PROCEDURE — 74177 CT ABD & PELVIS W/CONTRAST: CPT | Performed by: EMERGENCY MEDICINE

## 2025-03-29 PROCEDURE — 96375 TX/PRO/DX INJ NEW DRUG ADDON: CPT

## 2025-03-29 PROCEDURE — 84484 ASSAY OF TROPONIN QUANT: CPT | Performed by: PHYSICIAN ASSISTANT

## 2025-03-29 PROCEDURE — 85379 FIBRIN DEGRADATION QUANT: CPT | Performed by: EMERGENCY MEDICINE

## 2025-03-29 PROCEDURE — 71275 CT ANGIOGRAPHY CHEST: CPT | Performed by: EMERGENCY MEDICINE

## 2025-03-29 PROCEDURE — 85730 THROMBOPLASTIN TIME PARTIAL: CPT | Performed by: PHYSICIAN ASSISTANT

## 2025-03-29 PROCEDURE — 85610 PROTHROMBIN TIME: CPT | Performed by: PHYSICIAN ASSISTANT

## 2025-03-29 PROCEDURE — 83735 ASSAY OF MAGNESIUM: CPT | Performed by: PHYSICIAN ASSISTANT

## 2025-03-29 PROCEDURE — 93010 ELECTROCARDIOGRAM REPORT: CPT

## 2025-03-29 PROCEDURE — 80053 COMPREHEN METABOLIC PANEL: CPT | Performed by: PHYSICIAN ASSISTANT

## 2025-03-29 PROCEDURE — 83880 ASSAY OF NATRIURETIC PEPTIDE: CPT | Performed by: PHYSICIAN ASSISTANT

## 2025-03-29 RX ORDER — HYDRALAZINE HYDROCHLORIDE 20 MG/ML
10 INJECTION INTRAMUSCULAR; INTRAVENOUS ONCE
Status: DISCONTINUED | OUTPATIENT
Start: 2025-03-29 | End: 2025-03-29

## 2025-03-29 RX ORDER — HYDRALAZINE HYDROCHLORIDE 20 MG/ML
10 INJECTION INTRAMUSCULAR; INTRAVENOUS ONCE
Status: COMPLETED | OUTPATIENT
Start: 2025-03-29 | End: 2025-03-29

## 2025-03-29 RX ORDER — MORPHINE SULFATE 4 MG/ML
4 INJECTION, SOLUTION INTRAMUSCULAR; INTRAVENOUS ONCE
Status: COMPLETED | OUTPATIENT
Start: 2025-03-29 | End: 2025-03-29

## 2025-03-29 RX ORDER — CLONIDINE HYDROCHLORIDE 0.1 MG/1
0.2 TABLET ORAL ONCE
Status: COMPLETED | OUTPATIENT
Start: 2025-03-29 | End: 2025-03-29

## 2025-03-29 RX ORDER — KETOROLAC TROMETHAMINE 15 MG/ML
15 INJECTION, SOLUTION INTRAMUSCULAR; INTRAVENOUS ONCE
Status: COMPLETED | OUTPATIENT
Start: 2025-03-29 | End: 2025-03-29

## 2025-03-29 RX ORDER — HYDROMORPHONE HYDROCHLORIDE 1 MG/ML
0.2 INJECTION, SOLUTION INTRAMUSCULAR; INTRAVENOUS; SUBCUTANEOUS ONCE
Status: COMPLETED | OUTPATIENT
Start: 2025-03-29 | End: 2025-03-29

## 2025-03-29 RX ORDER — HEPARIN SODIUM 5000 [USP'U]/ML
5000 INJECTION, SOLUTION INTRAVENOUS; SUBCUTANEOUS EVERY 8 HOURS SCHEDULED
Status: DISCONTINUED | OUTPATIENT
Start: 2025-03-29 | End: 2025-03-30

## 2025-03-29 RX ORDER — ALBUMIN (HUMAN) 12.5 G/50ML
25 SOLUTION INTRAVENOUS
Status: DISCONTINUED | OUTPATIENT
Start: 2025-03-29 | End: 2025-03-30

## 2025-03-29 NOTE — ED QUICK NOTES
Orders for admission, patient is aware of plan and ready to go upstairs. Any questions, please call ED RN rishabh at extension [phone].     Patient Covid vaccination status: Fully vaccinated     COVID Test Ordered in ED: Rapid SARS-CoV-2 by PCR    COVID Suspicion at Admission: N/A    Running Infusions:  None    Mental Status/LOC at time of transport: a&ox4    Other pertinent information:   CIWA score: N/A   NIH score:  N/A

## 2025-03-29 NOTE — ED PROVIDER NOTES
Patient Seen in: [facility] In [place]      History     Chief Complaint   Patient presents with    Difficulty Breathing    Chest Pain Angina     Stated Complaint: SOB., abd pain and chest pain    Subjective:   HPI      44-year-old male past medical history of asthma, bipolar disorder, CKD, CHF, CAD, DVT/PE, diabetes, dialysis, stroke who presents to the ER for chest pain, shortness of breath and worsening of cough.  Patient was recently admitted for rectal bleeding likely related to chronic hemorrhoids as well as pneumonia and noncompliance with dialysis.  He was just discharged from the hospital 2 days ago and reports since then he noted worsening of cough yesterday.  Also reports today while he was trying to get up off the couch he noted midsternal/left-sided chest pain that has been persistent for the past 2 hours.  Reports also worsening shortness of breath since that time and chest pain is worse with ambulation.  Also had episode of vomiting with chest pain.  Also reports worsening of his chronic abdominal pain at the left upper quadrant which she has had for several months.  Reports subjective fever and chills at night as well as new congestion.  Denies any new sore throat, leg swelling, leg pain.  Reports persistent rectal bleeding with each bowel movement as well as frequent bowel movements, \"dark red blood\".  He was not discharged with any medications from his recent admission.  Next dialysis session is due Monday, last full dialysis session was 2 days ago.          Objective:     Past Medical History:    Anxiety state    Arrhythmia    Asthma (HCC)    Attention deficit hyperactivity disorder (ADHD)    Back problem    Bipolar 1 disorder (HCC)    CKD (chronic kidney disease) stage 3, GFR 30-59 ml/min ([facility])    Dr Meeks    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HCC)    Congestive heart disease (HCC)    COPD (chronic obstructive pulmonary disease) (HCC)    Coronary atherosclerosis    Deep vein  thrombosis (HCC)    at age 17 R/T cast    Depression    Diabetes (HCC)    Dialysis patient    Diverticulosis of large intestine    Essential hypertension    3/21 echo: severe concentric LVH with normal EF and no MR or pHTN    Extrinsic asthma, unspecified    Heart attack (HCC)    2016- angiogram- no intervention    Heart valve disease    mitral valve repair in 1994/    High blood pressure    High cholesterol    History of blood transfusion    History of mitral valve repair    Hyperlipidemia    Low back pain    tight and stiff after sweeping and mopping    LVH (left ventricular hypertrophy)    Migraines    Mixed hyperlipidemia     HDL 38 LDL 97 VLDL 57     Monoclonal gammopathy    IgG kappa     Muscle weakness    MVP (mitral valve prolapse)    Repair 1994 at [facility]; echoes as recently as 3/21 show mild or trivial MR and no stenosis    Neuropathy    Osteoarthritis    hip ,knees    Pneumonia due to organism    Pulmonary embolism (HCC)    Renal disorder    Stroke (HCC)    TIA (transient ischemic attack)    Initial history of left-sided weakness and slurred speech. (+) cocaine. MRI of the brain, CT angiogram of the head and neck, and 2D echo are all unremarkable.     TMJ (dislocation of temporomandibular joint)    Troponin level elevated    Trop 60 60 47 with TIA and no CP: Lexiscan negative with EF 51    Visual impairment              Past Surgical History:   Procedure Laterality Date    Av fistula revision, open Left     Cabg      Colonoscopy N/A 03/26/2023    Procedure: COLONOSCOPY;  Surgeon: Heath Vu MD;  Location:  ENDOSCOPY    Colonoscopy N/A 12/30/2023    Procedure: COLONOSCOPY with cold snare polypectomy and forcep polypectomy;  Surgeon: Ousmane Suarez MD;  Location:  ENDOSCOPY    Colonoscopy N/A 3/9/2025    Procedure: COLONOSCOPY WITH COLD SNARE POLYPECTOMY AND ENDO CLIPS X 2;  Surgeon: Bakari Noguera MD;  Location:  ENDOSCOPY    Colonoscopy & polypectomy  2019    [facility]  2019     Duodenitis. Biopsied. EUS for weight loss was negative    Heart surgery      Hernia surgery  08/17/2022    Dr Barnes    Laminectomy,>2 sgmt,lumbar  09/06/2018    L4-L5 Decomp Discectomy ROEM L4-L5    Mitralplasty w cp bypass  1994    [facility]: Repair    Repair rotator cuff,chronic Left     torn and had a ruptured bicep    Sinus surgery        Spine surgery procedure unlisted      Valve repair  1994    mitral valve                Social History     Socioeconomic History    Marital status:    Tobacco Use    Smoking status: Former     Current packs/day: 0.00     Average packs/day: 1 pack/day for 27.0 years (27.0 ttl pk-yrs)     Types: Cigarettes     Start date: 2/28/1995     Quit date: 2/28/2022     Years since quitting: 3.0     Passive exposure: Never    Smokeless tobacco: Never   Vaping Use    Vaping status: Never Used   Substance and Sexual Activity    Alcohol use: No    Drug use: No     Social Drivers of Health     Food Insecurity: No Food Insecurity (3/26/2025)    NCSS - Food Insecurity     Worried About Running Out of Food in the Last Year: No     Ran Out of Food in the Last Year: No   Transportation Needs: No Transportation Needs (3/26/2025)    NCSS - Transportation     Lack of Transportation: No   Housing Stability: Not At Risk (3/26/2025)    NCSS - Housing/Utilities     Has Housing: Yes     Worried About Losing Housing: No     Unable to Get Utilities: No                  Physical Exam     ED Triage Vitals   BP 03/29/25 1423 (!) 186/125   Pulse 03/29/25 1404 106   Resp 03/29/25 1419 (!) 28   Temp 03/29/25 1420 98.9 °F (37.2 °C)   Temp src --    SpO2 03/29/25 1404 96 %   O2 Device 03/29/25 1404 None (Room air)       Current Vitals:   Vital Signs  BP: (!) 177/101  Pulse: 106  Resp: 22  Temp: 98.9 °F (37.2 °C)    Oxygen Therapy  SpO2: 100 %  O2 Device: Nasal cannula  O2 Flow Rate (L/min): 2 L/min        Physical Exam  GENERAL APPEARANCE:  AxOx4,   HEENT:  NC, AT. MMM. EOMI, clear conjunctiva, oropharynx  clear.  NECK:  Supple without lymphadenopathy.  No stiffness or restricted ROM.  HEART:  Normal rate and regular rhythm, normal S1/S1, no m/r/g  LUNGS: Tachypneic, clear breath sounds otherwise.  CTAB, moving air well. No crackles or wheezes are heard.  ABDOMEN:  Soft, nontender, nondistended with good bowel sounds heard.  BACK: No CVAT, no obvious deformity.  EXTREMITIES:  Without cyanosis, clubbing or edema.  NEUROLOGICAL:  Grossly nonfocal. Alert and oriented, moving all 4 extremities. CN not formally tested but appear grossly intact. Observed to ambulate with normal gait.  Skin:  Warm and dry without any rash.        ED Course     Labs Reviewed   COMP METABOLIC PANEL (14) - Abnormal; Notable for the following components:       Result Value    Glucose 140 (*)     BUN 56 (*)     Creatinine 8.33 (*)     Calculated Osmolality 308 (*)     eGFR-Cr 7 (*)     AST 40 (*)     All other components within normal limits   CBC WITH DIFFERENTIAL WITH PLATELET - Abnormal; Notable for the following components:    RBC 2.56 (*)     HGB 8.5 (*)     HCT 24.3 (*)     All other components within normal limits   TROPONIN I HIGH SENSITIVITY - Abnormal; Notable for the following components:    Troponin I (High Sensitivity) 337 (*)     All other components within normal limits   TROPONIN I HIGH SENSITIVITY - Abnormal; Notable for the following components:    Troponin I (High Sensitivity) 332 (*)     All other components within normal limits   LIPASE - Abnormal; Notable for the following components:    Lipase 74 (*)     All other components within normal limits   PRO BETA NATRIURETIC PEPTIDE - Abnormal; Notable for the following components:    Pro-Beta Natriuretic Peptide 15,185 (*)     All other components within normal limits   D-DIMER - Abnormal; Notable for the following components:    D-Dimer 2.40 (*)     All other components within normal limits   PROTHROMBIN TIME (PT) - Normal   PTT, ACTIVATED - Normal   MAGNESIUM - Normal   POCT  FLU TEST - Normal    Narrative:     This assay is a rapid molecular in vitro test utilizing nucleic acid amplification of influenza A and B viral RNA.   RAPID SARS-COV-2 BY PCR - Normal   URINALYSIS, ROUTINE   RAINBOW DRAW LAVENDER   RAINBOW DRAW LIGHT GREEN   RAINBOW DRAW BLUE     EKG  EKG shows normal sinus rhythm with rate of 92 with no ST changes, normal axis and intervals, LVH and prolonged QTc was noted which is similar to prior.                CTA CHEST + CT ABD (W) + CT PEL (W) (CPT=71275/10577)    Result Date: 3/29/2025  PROCEDURE:  CTA CHEST + CT ABD (W) + CT PEL (W) SH(CPT=71275/88877)  COMPARISON:  [facility], CT, CT ABDOMEN+PELVIS(CONTRAST ONLY)(CPT=74177), 3/14/2025, 11:43 PM.  INDICATIONS:  SOB., abd pain and chest pain  TECHNIQUE:  CT of the chest (with CTA imaging), abdomen, and pelvis were obtained post- injection of non-ionic intravenous contrast material. Multi-planar reformatted/3-D images were created to optimize visualization of vascular anatomy.  Dose reduction techniques were used. Dose information is transmitted to the ACR (American College of Radiology) NRDR (National Radiology Data Registry) which includes the Dose Index Registry.  PATIENT STATED HISTORY:(As transcribed by Technologist)  The patient has shortness of breath with chest and abdominal pain. Patient is ESRD and approved for injection by nephrology.   CONTRAST USED:  100cc of Isovue 370  FINDINGS:   CHEST:   LUNGS/PLEURA:  Continued small right pleural effusion without left pleural effusion.  Multiple areas of paraseptal emphysema seen lungs bilaterally.  No pneumothorax seen.  Redemonstration scarring and chronic atelectasis in the mid-lower lung on the right.  Similar to the prior CT from a few days ago.  Bronchial wall thickening is present.  No sign of bronchiectasis.  THORACIC VASCULAR:  No sign of acute pulmonary embolism.  No filling defects seen centrally or peripherally in the pulmonary arteries.  Thoracic aorta normal  caliber, without sign of aneurysm, no sign of thoracic aortic dissection.  MEDIASTINUM/BASIL:  Mildly enlarged mediastinal and right hilar lymph nodes.  CARDIAC:  Unremarkable.  CHEST WALL:  Unremarkable.  OTHER:  None.  ABDOMEN/PELVIS:  LIVER:  Unremarkable.  BILIARY:  Unremarkable.  PANCREAS:  Unremarkable.  SPLEEN:  Unremarkable.  KIDNEYS:  No acute abnormality.  ADRENALS:  Unremarkable.  AORTA/VASCULAR:  No aortic aneurysm.  RETROPERITONEUM:  Unremarkable.  BOWEL/MESENTERY: No acute process.  No excessive stool in the colon, sign of colitis, diverticulitis, bowel obstruction, free air, large volume ascites, mesenteric adenopathy, mesenteric edema.  Normal appendix seen.  ABDOMINAL WALL:  Unremarkable.  URINARY BLADDER:  Urinary bladder wall thickening.  PELVIC NODES:  Unremarkable.  PELVIC ORGANS:  No acute process.  OTHER:  None.  SKELETAL STRUCTURES IN THE CHEST/ABDOMEN/PELVIS:  BONES:  No acute abnormality.             CONCLUSION:  Thickening urinary bladder wall correlate for cystitis.  No acute pulmonary embolism.  Small right pleural effusion and areas of pleural and parenchymal scarring redemonstrated in the right mid-lower lung.  Bronchial wall thickening suspect bronchitis.   LOCATION:  [facility]   Dictated by (CST): Joe London MD on 3/29/2025 at 5:06 PM     Finalized by (CST): Joe London MD on 3/29/2025 at 5:10 PM       XR CHEST AP PORTABLE  (CPT=71045)    Result Date: 3/29/2025  PROCEDURE:  XR CHEST AP PORTABLE  (CPT=71045)  TECHNIQUE:  AP chest radiograph was obtained.  COMPARISON:  [facility], XR, XR CHEST AP/PA (1 VIEW) (CPT=71045), 3/26/2025, 2:24 AM.  [facility], XR, XR CHEST AP PORTABLE  (CPT=71045), 3/18/2025, 1:00 AM.  INDICATIONS:  SOB., abd pain and chest pain  PATIENT STATED HISTORY: (As transcribed by Technologist)  Patient states he has shortness of breath for 1 week that worsened today and left sided chest pain. History of asthma and pneumonia.    FINDINGS:  The suspect trace right  pleural effusion.  There is patchy right lung airspace opacity which appears improved in comparison to 3/26/2025, likely reflecting pneumonia/aspiration in the appropriate clinical setting.  No pneumothorax.  Cardiomediastinal silhouette is stable.  Cardiac valvular replacement.            CONCLUSION:  See above   LOCATION:  [facility]      Dictated by (CST): Duy De La Rosa MD on 3/29/2025 at 2:43 PM     Finalized by (CST): Duy De La Rosa MD on 3/29/2025 at 2:44 PM       XR CHEST AP/PA (1 VIEW) (CPT=71045)    Result Date: 3/26/2025  PROCEDURE:  XR CHEST AP/PA (1 VIEW) (CPT=71045)  TECHNIQUE:  AP chest radiograph was obtained.  COMPARISON:  [facility], XR, XR CHEST AP PORTABLE  (CPT=71045), 3/18/2025, 1:00 AM.  INDICATIONS:  Rectal bleeding, abdominal pain, SOB  PATIENT STATED HISTORY: (As transcribed by Technologist)  Generalized weakness and shortness of breath.              CONCLUSION:  Patchy infiltrate and effusion in the right midlung and base.  Findings concerning for persistent pneumonia.  Heart size and pulmonary vascularity within normal limits.   LOCATION:  [id number]      Dictated by (CST): Arlyn Oglesby MD on 3/26/2025 at 8:14 AM     Finalized by (CST): Arlyn Oglesby MD on 3/26/2025 at 8:14 AM            MDM      44-year-old male who presents to the ER for chest pain, shortness of breath.  Recent mission, discharged 2 days ago, worsening of cough and rectal bleeding since that time as well.  Vitals show elevated blood pressure 186/125 as well as tachypnea.  Differential diagnosis includes but does not exclude cardiac ischemia related to anemia versus MI versus fluid overload versus pneumonia versus PE versus electrolyte abnormality.  Breath sounds decreased in bilateral lower lobes, tachypnea.  Abdominal discomfort on exam. Most recent colonoscopy was completed 3/9/2025 and showed diffuse diverticulosis, polyps and hemorrhoids.  EKG shows LVH and prolonged QTc but no other acute findings noted.  CBC shows  improved hemoglobin from prior admission, 8.5 today.  CMP with baseline creatinine for dialysis patient, no other acute findings noted.  BNP and troponin are both improved compared to prior admission levels.  Dimer was elevated so CT PE was obtained.  This shows bronchial wall thickening as well as small right pleural effusion but no other acute findings noted.  Added UA for findings of bladder inflammation on CT.  Blood pressure improving with hydralazine 10 mg x 2 given as well as clonidine 0.2 mg given.   Patient was also seen and evaluated by Dr. Reynoso. She discussed case with patient nephrologist on-call, Dr. Lio Bond.  Agree with admission to the hospital for dialysis tonight given the patient had contrast today.  Also reports likely noncompliance with dialysis, patient reported to us that he was told that he is not due for dialysis until Monday which nephrology states was not the discussion that they had with him.  Spoke with hospitalist on-call, Dr. Singh who agrees with plan for admission for observation and dialysis tonight.  Admission order was placed.    Admission disposition: 3/29/2025  5:23 PM           Medical Decision Making      Disposition and Plan     Clinical Impression:  1. Chest pain of uncertain etiology    2. SOB (shortness of breath)    3. Left sided abdominal pain         Disposition:  Admit  3/29/2025  5:23 pm    Follow-up:  No follow-up provider specified.        Medications Prescribed:  Current Discharge Medication List              Supplementary Documentation:         Hospital Problems       Present on Admission  Date Reviewed: 1/18/2025            ICD-10-CM Noted POA    * (Principal) Chest pain of uncertain etiology R07.9 1/18/2025 Unknown

## 2025-03-29 NOTE — ED QUICK NOTES
Jojo WHALEN notified pt was placed on O2 d/t c/o of worsening SOB. Pt also states pain is not improved post meds.

## 2025-03-30 VITALS
HEIGHT: 74 IN | SYSTOLIC BLOOD PRESSURE: 163 MMHG | RESPIRATION RATE: 15 BRPM | DIASTOLIC BLOOD PRESSURE: 114 MMHG | BODY MASS INDEX: 31.83 KG/M2 | WEIGHT: 248 LBS | HEART RATE: 76 BPM | TEMPERATURE: 97 F | OXYGEN SATURATION: 95 %

## 2025-03-30 LAB
ALBUMIN SERPL-MCNC: 4 G/DL (ref 3.2–4.8)
ANION GAP SERPL CALC-SCNC: 13 MMOL/L (ref 0–18)
ATRIAL RATE: 92 BPM
BASOPHILS # BLD AUTO: 0.09 X10(3) UL (ref 0–0.2)
BASOPHILS NFR BLD AUTO: 1.5 %
BILIRUB UR QL STRIP.AUTO: NEGATIVE
BUN BLD-MCNC: 61 MG/DL (ref 9–23)
CALCIUM BLD-MCNC: 9.3 MG/DL (ref 8.7–10.6)
CHLORIDE SERPL-SCNC: 106 MMOL/L (ref 98–112)
CLARITY UR REFRACT.AUTO: CLEAR
CO2 SERPL-SCNC: 21 MMOL/L (ref 21–32)
CREAT BLD-MCNC: 9.37 MG/DL
EGFRCR SERPLBLD CKD-EPI 2021: 6 ML/MIN/1.73M2 (ref 60–?)
EOSINOPHIL # BLD AUTO: 0.34 X10(3) UL (ref 0–0.7)
EOSINOPHIL NFR BLD AUTO: 5.6 %
ERYTHROCYTE [DISTWIDTH] IN BLOOD BY AUTOMATED COUNT: 14.7 %
GLUCOSE BLD-MCNC: 134 MG/DL (ref 70–99)
GLUCOSE UR STRIP.AUTO-MCNC: NORMAL MG/DL
HCT VFR BLD AUTO: 22.8 %
HGB BLD-MCNC: 7.8 G/DL
IMM GRANULOCYTES # BLD AUTO: 0.02 X10(3) UL (ref 0–1)
IMM GRANULOCYTES NFR BLD: 0.3 %
KETONES UR STRIP.AUTO-MCNC: NEGATIVE MG/DL
LEUKOCYTE ESTERASE UR QL STRIP.AUTO: NEGATIVE
LYMPHOCYTES # BLD AUTO: 1.09 X10(3) UL (ref 1–4)
LYMPHOCYTES NFR BLD AUTO: 17.8 %
MAGNESIUM SERPL-MCNC: 2.2 MG/DL (ref 1.6–2.6)
MCH RBC QN AUTO: 32.6 PG (ref 26–34)
MCHC RBC AUTO-ENTMCNC: 34.2 G/DL (ref 31–37)
MCV RBC AUTO: 95.4 FL
MONOCYTES # BLD AUTO: 0.71 X10(3) UL (ref 0.1–1)
MONOCYTES NFR BLD AUTO: 11.6 %
NEUTROPHILS # BLD AUTO: 3.86 X10 (3) UL (ref 1.5–7.7)
NEUTROPHILS # BLD AUTO: 3.86 X10(3) UL (ref 1.5–7.7)
NEUTROPHILS NFR BLD AUTO: 63.2 %
NITRITE UR QL STRIP.AUTO: NEGATIVE
OSMOLALITY SERPL CALC.SUM OF ELEC: 309 MOSM/KG (ref 275–295)
P AXIS: 35 DEGREES
P-R INTERVAL: 194 MS
PH UR STRIP.AUTO: 6.5 [PH] (ref 5–8)
PHOSPHATE SERPL-MCNC: 5.9 MG/DL (ref 2.4–5.1)
PLATELET # BLD AUTO: 211 10(3)UL (ref 150–450)
POTASSIUM SERPL-SCNC: 4.3 MMOL/L (ref 3.5–5.1)
PROT UR STRIP.AUTO-MCNC: 100 MG/DL
Q-T INTERVAL: 384 MS
QRS DURATION: 96 MS
QTC CALCULATION (BEZET): 474 MS
R AXIS: 30 DEGREES
RBC # BLD AUTO: 2.39 X10(6)UL
SODIUM SERPL-SCNC: 140 MMOL/L (ref 136–145)
SP GR UR STRIP.AUTO: 1.03 (ref 1–1.03)
T AXIS: 98 DEGREES
UROBILINOGEN UR STRIP.AUTO-MCNC: NORMAL MG/DL
VENTRICULAR RATE: 92 BPM
WBC # BLD AUTO: 6.1 X10(3) UL (ref 4–11)

## 2025-03-30 PROCEDURE — 80069 RENAL FUNCTION PANEL: CPT | Performed by: INTERNAL MEDICINE

## 2025-03-30 PROCEDURE — 85025 COMPLETE CBC W/AUTO DIFF WBC: CPT

## 2025-03-30 PROCEDURE — 81001 URINALYSIS AUTO W/SCOPE: CPT | Performed by: PHYSICIAN ASSISTANT

## 2025-03-30 PROCEDURE — 96375 TX/PRO/DX INJ NEW DRUG ADDON: CPT

## 2025-03-30 PROCEDURE — 83735 ASSAY OF MAGNESIUM: CPT

## 2025-03-30 PROCEDURE — 90935 HEMODIALYSIS ONE EVALUATION: CPT | Performed by: INTERNAL MEDICINE

## 2025-03-30 RX ORDER — LABETALOL HYDROCHLORIDE 5 MG/ML
10 INJECTION, SOLUTION INTRAVENOUS EVERY 4 HOURS PRN
Status: DISCONTINUED | OUTPATIENT
Start: 2025-03-30 | End: 2025-03-30

## 2025-03-30 RX ORDER — LAMOTRIGINE 100 MG/1
100 TABLET ORAL DAILY
Status: DISCONTINUED | OUTPATIENT
Start: 2025-03-30 | End: 2025-03-30

## 2025-03-30 RX ORDER — HYDROCODONE BITARTRATE AND ACETAMINOPHEN 5; 325 MG/1; MG/1
1 TABLET ORAL EVERY 4 HOURS PRN
Status: DISCONTINUED | OUTPATIENT
Start: 2025-03-30 | End: 2025-03-30

## 2025-03-30 RX ORDER — HYDRALAZINE HYDROCHLORIDE 25 MG/1
25 TABLET, FILM COATED ORAL 3 TIMES DAILY
Status: DISCONTINUED | OUTPATIENT
Start: 2025-03-30 | End: 2025-03-30

## 2025-03-30 RX ORDER — BUPROPION HYDROCHLORIDE 150 MG/1
150 TABLET ORAL DAILY
Status: DISCONTINUED | OUTPATIENT
Start: 2025-03-30 | End: 2025-03-30

## 2025-03-30 RX ORDER — LOSARTAN POTASSIUM 100 MG/1
100 TABLET ORAL DAILY
Status: DISCONTINUED | OUTPATIENT
Start: 2025-03-30 | End: 2025-03-30

## 2025-03-30 RX ORDER — CARVEDILOL 12.5 MG/1
25 TABLET ORAL 2 TIMES DAILY WITH MEALS
Status: DISCONTINUED | OUTPATIENT
Start: 2025-03-30 | End: 2025-03-30

## 2025-03-30 RX ORDER — HYDROCODONE BITARTRATE AND ACETAMINOPHEN 5; 325 MG/1; MG/1
2 TABLET ORAL EVERY 4 HOURS PRN
Status: DISCONTINUED | OUTPATIENT
Start: 2025-03-30 | End: 2025-03-30

## 2025-03-30 RX ORDER — TAMSULOSIN HYDROCHLORIDE 0.4 MG/1
0.4 CAPSULE ORAL DAILY
Status: DISCONTINUED | OUTPATIENT
Start: 2025-03-30 | End: 2025-03-30

## 2025-03-30 RX ORDER — GABAPENTIN 100 MG/1
200 CAPSULE ORAL 3 TIMES DAILY
Status: DISCONTINUED | OUTPATIENT
Start: 2025-03-30 | End: 2025-03-30

## 2025-03-30 RX ORDER — ATORVASTATIN CALCIUM 20 MG/1
20 TABLET, FILM COATED ORAL EVERY EVENING
Status: DISCONTINUED | OUTPATIENT
Start: 2025-03-30 | End: 2025-03-30

## 2025-03-30 RX ORDER — PANTOPRAZOLE SODIUM 40 MG/1
40 TABLET, DELAYED RELEASE ORAL
Status: DISCONTINUED | OUTPATIENT
Start: 2025-03-31 | End: 2025-03-30

## 2025-03-30 RX ORDER — NIFEDIPINE 30 MG/1
30 TABLET, EXTENDED RELEASE ORAL DAILY
Status: DISCONTINUED | OUTPATIENT
Start: 2025-03-30 | End: 2025-03-30

## 2025-03-30 NOTE — PLAN OF CARE
Pt A&Ox4 o2>95% on room air (2L via nasal canula placed for comfort), left arm fistula, pt states increase in work of breathing with episodes of SOB, chest tightness/discomfort after not receiving hemodialysis, 1x dose of IV pain rx given, pt denies dizziness and palpitation.    Problem: Diabetes/Glucose Control  Goal: Glucose maintained within prescribed range  Description: INTERVENTIONS:- Monitor Blood Glucose as ordered- Assess for signs and symptoms of hyperglycemia and hypoglycemia- Administer ordered medications to maintain glucose within target range- Assess barriers to adequate nutritional intake and initiate nutrition consult as needed- Instruct patient on self management of diabetes  Outcome: Progressing     Problem: Patient/Family Goals  Goal: Patient/Family Long Term Goal  Description: Patient's Long Term Goal: feel better Interventions:- follow MD plan of care- See additional Care Plan goals for specific interventions  Outcome: Progressing  Goal: Patient/Family Short Term Goal  Description: Patient's Short Term Goal: hemodialysis Interventions: - follow MD orders- See additional Care Plan goals for specific interventions  Outcome: Progressing

## 2025-03-30 NOTE — H&P (VIEW-ONLY)
[facility]   part of [facility]    Report of Consultation    Godwin Fonseca Patient Status:  Observation    1980 MRN [id number]   Location [facility] 2NE-A Attending Vicky Weiss, DO   Hosp Day # 0 PCP Adrian Small MD       REASON FOR CONSULT:     ESRD    HISTORY OF PRESENT ILLNESS:     43 yo M with history of ESRD on iHD MWF via LUE AVF, HTN, severe MR s/p MVR x 2, HFpEF, DVT/PE, TIA, MGUS, bipolar disorder, recurrent GI bleed, diffuse body pains presents with SOB, CP, cough. Was recently discharged after HD on 3/27; that hospitalization presented with rectal bleeding. States he still has this too. He didn't go to his scheduled HD on Friday citing that he had 2 HD treatments in hospital. He states he has not been eating well and vomiting but feels swollen in his abdomen. He was ordered for HD last night, but RN could not access his fistula. He is now undergoing HD without complaints.    REVIEW OF SYSTEMS:     Please see HPI for pertinent positives. 10 point review of systems otherwise reviewed and negative.     HISTORY:     Past Medical History:    Anxiety state    Arrhythmia    Asthma (HCC)    Attention deficit hyperactivity disorder (ADHD)    Back problem    Bipolar 1 disorder (HCC)    CKD (chronic kidney disease) stage 3, GFR 30-59 ml/min (HCC)    Dr Meeks    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HCC)    Congestive heart disease (HCC)    COPD (chronic obstructive pulmonary disease) (HCC)    Coronary atherosclerosis    Deep vein thrombosis (HCC)    at age 17 R/T cast    Depression    Diabetes (HCC)    Dialysis patient    Diverticulosis of large intestine    Essential hypertension    3/21 echo: severe concentric LVH with normal EF and no MR or pHTN    Extrinsic asthma, unspecified    Heart attack (HCC)    - angiogram- no intervention    Heart valve disease    mitral valve repair in /    High blood pressure    High cholesterol    History of blood transfusion    History of mitral valve  repair    Hyperlipidemia    Low back pain    tight and stiff after sweeping and mopping    LVH (left ventricular hypertrophy)    Migraines    Mixed hyperlipidemia     HDL 38 LDL 97 VLDL 57     Monoclonal gammopathy    IgG kappa     Muscle weakness    MVP (mitral valve prolapse)    Repair 1994 at [place]; echoes as recently as 3/21 show mild or trivial MR and no stenosis    Neuropathy    Osteoarthritis    hip ,knees    Pneumonia due to organism    Pulmonary embolism (HCC)    Renal disorder    Stroke (HCC)    TIA (transient ischemic attack)    Initial history of left-sided weakness and slurred speech. (+) cocaine. MRI of the brain, CT angiogram of the head and neck, and 2D echo are all unremarkable.     TMJ (dislocation of temporomandibular joint)    Troponin level elevated    Trop 60 60 47 with TIA and no CP: Lexiscan negative with EF 51    Visual impairment     Past Surgical History:   Procedure Laterality Date    Av fistula revision, open Left     Cabg      Colonoscopy N/A 03/26/2023    Procedure: COLONOSCOPY;  Surgeon: Heath Vu MD;  Location:  ENDOSCOPY    Colonoscopy N/A 12/30/2023    Procedure: COLONOSCOPY with cold snare polypectomy and forcep polypectomy;  Surgeon: Ousmane Suarez MD;  Location:  ENDOSCOPY    Colonoscopy N/A 3/9/2025    Procedure: COLONOSCOPY WITH COLD SNARE POLYPECTOMY AND ENDO CLIPS X 2;  Surgeon: Bakari Noguera MD;  Location:  ENDOSCOPY    Colonoscopy & polypectomy  2019    Egd  2019    Duodenitis. Biopsied. EUS for weight loss was negative    Heart surgery      Hernia surgery  08/17/2022    Dr Barnes    Laminectomy,>2 sgmt,lumbar  09/06/2018    L4-L5 Decomp Discectomy ROEM L4-L5    Mitralplasty w cp bypass  1994    [place]: Repair    Repair rotator cuff,chronic Left     torn and had a ruptured bicep    Sinus surgery        Spine surgery procedure unlisted      Valve repair  1994    mitral valve     Family History   Problem Relation Age of Onset    Hypertension  Father     Alcohol and Other Disorders Associated Father     Substance Abuse Father         cocaine    Dementia Father     Cancer Father         lung    Diabetes Mother     Cancer Mother         multiple myeloma    Hypertension Mother     Anxiety Maternal Aunt     Depression Maternal Aunt     Anxiety Maternal Aunt     Depression Maternal Aunt     Bipolar Disorder Maternal Aunt     Diabetes Maternal Grandmother     Hypertension Maternal Grandmother     Cancer Maternal Grandfather         stomach cancer    Diabetes Maternal Grandfather     Hypertension Maternal Grandfather     Alcohol and Other Disorders Associated Maternal Grandfather     Hypertension Paternal Grandmother     Hypertension Paternal Grandfather     Cancer Sister         uterine and ovarian    Hypertension Sister     Cancer Maternal Uncle         lung    Cancer Paternal Aunt         throat      reports that he quit smoking about 3 years ago. His smoking use included cigarettes. He started smoking about 30 years ago. He has a 27 pack-year smoking history. He has never been exposed to tobacco smoke. He has never used smokeless tobacco. He reports that he does not drink alcohol and does not use drugs.    ALLERGIES:     Allergies[1]    MEDICATIONS:       Current Facility-Administered Medications:     HYDROcodone-acetaminophen (Norco) 5-325 MG per tab 1 tablet, 1 tablet, Oral, Q4H PRN **OR** HYDROcodone-acetaminophen (Norco) 5-325 MG per tab 2 tablet, 2 tablet, Oral, Q4H PRN    labetalol (Trandate) 5 mg/mL injection 10 mg, 10 mg, Intravenous, Q4H PRN    heparin (Porcine) 5000 UNIT/ML injection 5,000 Units, 5,000 Units, Subcutaneous, Q8H MARTHA    sodium chloride 0.9 % IV bolus 100 mL, 100 mL, Intravenous, Q30 Min PRN **AND** albumin human (Albumin) 25% injection 25 g, 25 g, Intravenous, PRN Dialysis  No current outpatient medications on file.         PHYSICAL EXAM:     Vital Signs: BP (!) 163/114 (BP Location: Right arm)   Pulse 76   Temp 97 °F (36.1 °C)  (Oral)   Resp 15   Ht 188 cm (6' 2\")   Wt 248 lb 0.3 oz (112.5 kg)   SpO2 95%   BMI 31.84 kg/m²   Temp (24hrs), Av.7 °F (36.5 °C), Min:97 °F (36.1 °C), Max:98.9 °F (37.2 °C)       Intake/Output Summary (Last 24 hours) at 3/30/2025 1213  Last data filed at 3/30/2025 0900  Gross per 24 hour   Intake 280 ml   Output 550 ml   Net -270 ml     Wt Readings from Last 3 Encounters:   25 248 lb 0.3 oz (112.5 kg)   25 242 lb 4.6 oz (109.9 kg)   25 257 lb (116.6 kg)       General: pleasant, well appearing  Skin: no visible rashes  HEENT: NCAT  Cardiac: Regular rate and rhythm, no murmur/gallop or rub  Lungs: CTAB, no wheeze, no rale, no rhonchi  Abdomen: Soft, NTND  Extremities: warm, well perfused, no leg edema  Neurologic/Psych: mentating well, no asterixis    LABORATORY DATA:       Lab Results   Component Value Date     (H) 2025    BUN 61 (H) 2025    BUNCREA 13.0 2022    CREATSERUM 9.37 (H) 2025    ANIONGAP 13 2025    GFR 59 (L) 2018    GFRNAA 30 (L) 2022    GFRAA 35 (L) 2022    CA 9.3 2025    OSMOCALC 309 (H) 2025    ALKPHO 107 2025    AST 40 (H) 2025    ALT 46 2025    BILT 0.6 2025    TP 7.6 2025    ALB 4.0 2025    GLOBULIN 3.4 2025     2025    K 4.3 2025     2025    CO2 21.0 2025     Lab Results   Component Value Date    WBC 6.1 2025    RBC 2.39 (L) 2025    HGB 7.8 (L) 2025    HCT 22.8 (L) 2025    .0 2025    MPV 11.5 2012    MCV 95.4 2025    MCH 32.6 2025    MCHC 34.2 2025    RDW 14.7 2025    NEPRELIM 3.86 2025    NEPERCENT 63.2 2025    LYPERCENT 17.8 2025    MOPERCENT 11.6 2025    EOPERCENT 5.6 2025    BAPERCENT 1.5 2025    NE 3.86 2025    LYMABS 1.09 2025    MOABSO 0.71 2025    EOABSO 0.34 2025    BAABSO 0.09 2025      Lab Results   Component Value Date    MALBP 167.00 05/24/2023    CREUR 142.00 05/24/2023    CREUR 142.00 05/24/2023     Lab Results   Component Value Date    COLORUR Light-Yellow 03/30/2025    CLARITY Clear 03/30/2025    SPECGRAVITY 1.028 03/30/2025    GLUUR Normal 03/30/2025    BILUR Negative 03/30/2025    KETUR Negative 03/30/2025    BLOODURINE 2+ (A) 03/30/2025    PHURINE 6.5 03/30/2025    PROUR 100 (A) 03/30/2025    UROBILINOGEN Normal 03/30/2025    NITRITE Negative 03/30/2025    LEUUR Negative 03/30/2025    WBCUR 1-5 03/30/2025    RBCUR 0-2 03/30/2025    EPIUR Few (A) 03/30/2025    BACUR None Seen 03/30/2025    CAOXUR Occasional (A) 04/20/2018    HYLUR Present (A) 05/14/2019         IMAGING:     Reviewed.      ASSESSMENT/PLAN:   45 yo M with history of ESRD on iHD MWF via LUE AVF, HTN, severe MR s/p MVR x 2, HFpEF, DVT/PE, TIA, MGUS, bipolar disorder, recurrent GI bleed, diffuse body pains presents with SOB, CP, cough.    ESRD:  -- HD today 2-3L UF as tolerated, then resume HD tomorrow per outpatient schedule  -- avoid morphine  -- gabapentin should be renally dosed - defer to RPh/primary service     Anemia:  -- in the setting of bleeding/ESRD  -- epo with HD if BP lower; not today     MBD:  -- sevelamer 2400 TID AC  -- calcium acetate 667 TIDAC  -- low phos diet     HTN:  -- coreg and losartan pre-HD if SBP>180, otherwise hold  -- clonidine should be continued BID to avoid rebound  -- takes remaining antihypertensives after HD unless SBP>180 per above     LUE AVF:  -- L arm precautions     Rectal bleeding:  -- colonoscopy with pan-diverticulosis, hemorrhoids and polyps recently    Ok to discharge from renal perspective after HD today.       Thank you for allowing me to participate in the care of your patient. Please do not hesitate to contact me with concerns or questions.    Lenka Bond MD  [facility]         [1]   Allergies  Allergen Reactions    Hydrochlorothiazide RASH and HIVES

## 2025-03-30 NOTE — CONSULTS
[facility]   part of [facility]    Report of Consultation    Godwin Fonseca Patient Status:  Observation    1980 MRN [id number]   Location [facility] 2NE-A Attending Vicky Weiss, DO   Hosp Day # 0 PCP Adrian Small MD       REASON FOR CONSULT:     ESRD    HISTORY OF PRESENT ILLNESS:     43 yo M with history of ESRD on iHD MWF via LUE AVF, HTN, severe MR s/p MVR x 2, HFpEF, DVT/PE, TIA, MGUS, bipolar disorder, recurrent GI bleed, diffuse body pains presents with SOB, CP, cough. Was recently discharged after HD on 3/27; that hospitalization presented with rectal bleeding. States he still has this too. He didn't go to his scheduled HD on Friday citing that he had 2 HD treatments in hospital. He states he has not been eating well and vomiting but feels swollen in his abdomen. He was ordered for HD last night, but RN could not access his fistula. He is now undergoing HD without complaints.    REVIEW OF SYSTEMS:     Please see HPI for pertinent positives. 10 point review of systems otherwise reviewed and negative.     HISTORY:     Past Medical History:    Anxiety state    Arrhythmia    Asthma (HCC)    Attention deficit hyperactivity disorder (ADHD)    Back problem    Bipolar 1 disorder (HCC)    CKD (chronic kidney disease) stage 3, GFR 30-59 ml/min (HCC)    Dr Meeks    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HCC)    Congestive heart disease (HCC)    COPD (chronic obstructive pulmonary disease) (HCC)    Coronary atherosclerosis    Deep vein thrombosis (HCC)    at age 17 R/T cast    Depression    Diabetes (HCC)    Dialysis patient    Diverticulosis of large intestine    Essential hypertension    3/21 echo: severe concentric LVH with normal EF and no MR or pHTN    Extrinsic asthma, unspecified    Heart attack (HCC)    - angiogram- no intervention    Heart valve disease    mitral valve repair in /    High blood pressure    High cholesterol    History of blood transfusion    History of mitral valve  repair    Hyperlipidemia    Low back pain    tight and stiff after sweeping and mopping    LVH (left ventricular hypertrophy)    Migraines    Mixed hyperlipidemia     HDL 38 LDL 97 VLDL 57     Monoclonal gammopathy    IgG kappa     Muscle weakness    MVP (mitral valve prolapse)    Repair 1994 at [place]; echoes as recently as 3/21 show mild or trivial MR and no stenosis    Neuropathy    Osteoarthritis    hip ,knees    Pneumonia due to organism    Pulmonary embolism (HCC)    Renal disorder    Stroke (HCC)    TIA (transient ischemic attack)    Initial history of left-sided weakness and slurred speech. (+) cocaine. MRI of the brain, CT angiogram of the head and neck, and 2D echo are all unremarkable.     TMJ (dislocation of temporomandibular joint)    Troponin level elevated    Trop 60 60 47 with TIA and no CP: Lexiscan negative with EF 51    Visual impairment     Past Surgical History:   Procedure Laterality Date    Av fistula revision, open Left     Cabg      Colonoscopy N/A 03/26/2023    Procedure: COLONOSCOPY;  Surgeon: Heath Vu MD;  Location:  ENDOSCOPY    Colonoscopy N/A 12/30/2023    Procedure: COLONOSCOPY with cold snare polypectomy and forcep polypectomy;  Surgeon: Ousmane Suarez MD;  Location:  ENDOSCOPY    Colonoscopy N/A 3/9/2025    Procedure: COLONOSCOPY WITH COLD SNARE POLYPECTOMY AND ENDO CLIPS X 2;  Surgeon: Bakari Noguera MD;  Location:  ENDOSCOPY    Colonoscopy & polypectomy  2019    Egd  2019    Duodenitis. Biopsied. EUS for weight loss was negative    Heart surgery      Hernia surgery  08/17/2022    Dr Barnes    Laminectomy,>2 sgmt,lumbar  09/06/2018    L4-L5 Decomp Discectomy ROEM L4-L5    Mitralplasty w cp bypass  1994    [place]: Repair    Repair rotator cuff,chronic Left     torn and had a ruptured bicep    Sinus surgery        Spine surgery procedure unlisted      Valve repair  1994    mitral valve     Family History   Problem Relation Age of Onset    Hypertension  Father     Alcohol and Other Disorders Associated Father     Substance Abuse Father         cocaine    Dementia Father     Cancer Father         lung    Diabetes Mother     Cancer Mother         multiple myeloma    Hypertension Mother     Anxiety Maternal Aunt     Depression Maternal Aunt     Anxiety Maternal Aunt     Depression Maternal Aunt     Bipolar Disorder Maternal Aunt     Diabetes Maternal Grandmother     Hypertension Maternal Grandmother     Cancer Maternal Grandfather         stomach cancer    Diabetes Maternal Grandfather     Hypertension Maternal Grandfather     Alcohol and Other Disorders Associated Maternal Grandfather     Hypertension Paternal Grandmother     Hypertension Paternal Grandfather     Cancer Sister         uterine and ovarian    Hypertension Sister     Cancer Maternal Uncle         lung    Cancer Paternal Aunt         throat      reports that he quit smoking about 3 years ago. His smoking use included cigarettes. He started smoking about 30 years ago. He has a 27 pack-year smoking history. He has never been exposed to tobacco smoke. He has never used smokeless tobacco. He reports that he does not drink alcohol and does not use drugs.    ALLERGIES:     Allergies[1]    MEDICATIONS:       Current Facility-Administered Medications:     HYDROcodone-acetaminophen (Norco) 5-325 MG per tab 1 tablet, 1 tablet, Oral, Q4H PRN **OR** HYDROcodone-acetaminophen (Norco) 5-325 MG per tab 2 tablet, 2 tablet, Oral, Q4H PRN    labetalol (Trandate) 5 mg/mL injection 10 mg, 10 mg, Intravenous, Q4H PRN    heparin (Porcine) 5000 UNIT/ML injection 5,000 Units, 5,000 Units, Subcutaneous, Q8H MARTHA    sodium chloride 0.9 % IV bolus 100 mL, 100 mL, Intravenous, Q30 Min PRN **AND** albumin human (Albumin) 25% injection 25 g, 25 g, Intravenous, PRN Dialysis  No current outpatient medications on file.         PHYSICAL EXAM:     Vital Signs: BP (!) 163/114 (BP Location: Right arm)   Pulse 76   Temp 97 °F (36.1 °C)  (Oral)   Resp 15   Ht 188 cm (6' 2\")   Wt 248 lb 0.3 oz (112.5 kg)   SpO2 95%   BMI 31.84 kg/m²   Temp (24hrs), Av.7 °F (36.5 °C), Min:97 °F (36.1 °C), Max:98.9 °F (37.2 °C)       Intake/Output Summary (Last 24 hours) at 3/30/2025 1213  Last data filed at 3/30/2025 0900  Gross per 24 hour   Intake 280 ml   Output 550 ml   Net -270 ml     Wt Readings from Last 3 Encounters:   25 248 lb 0.3 oz (112.5 kg)   25 242 lb 4.6 oz (109.9 kg)   25 257 lb (116.6 kg)       General: pleasant, well appearing  Skin: no visible rashes  HEENT: NCAT  Cardiac: Regular rate and rhythm, no murmur/gallop or rub  Lungs: CTAB, no wheeze, no rale, no rhonchi  Abdomen: Soft, NTND  Extremities: warm, well perfused, no leg edema  Neurologic/Psych: mentating well, no asterixis    LABORATORY DATA:       Lab Results   Component Value Date     (H) 2025    BUN 61 (H) 2025    BUNCREA 13.0 2022    CREATSERUM 9.37 (H) 2025    ANIONGAP 13 2025    GFR 59 (L) 2018    GFRNAA 30 (L) 2022    GFRAA 35 (L) 2022    CA 9.3 2025    OSMOCALC 309 (H) 2025    ALKPHO 107 2025    AST 40 (H) 2025    ALT 46 2025    BILT 0.6 2025    TP 7.6 2025    ALB 4.0 2025    GLOBULIN 3.4 2025     2025    K 4.3 2025     2025    CO2 21.0 2025     Lab Results   Component Value Date    WBC 6.1 2025    RBC 2.39 (L) 2025    HGB 7.8 (L) 2025    HCT 22.8 (L) 2025    .0 2025    MPV 11.5 2012    MCV 95.4 2025    MCH 32.6 2025    MCHC 34.2 2025    RDW 14.7 2025    NEPRELIM 3.86 2025    NEPERCENT 63.2 2025    LYPERCENT 17.8 2025    MOPERCENT 11.6 2025    EOPERCENT 5.6 2025    BAPERCENT 1.5 2025    NE 3.86 2025    LYMABS 1.09 2025    MOABSO 0.71 2025    EOABSO 0.34 2025    BAABSO 0.09 2025      Lab Results   Component Value Date    MALBP 167.00 05/24/2023    CREUR 142.00 05/24/2023    CREUR 142.00 05/24/2023     Lab Results   Component Value Date    COLORUR Light-Yellow 03/30/2025    CLARITY Clear 03/30/2025    SPECGRAVITY 1.028 03/30/2025    GLUUR Normal 03/30/2025    BILUR Negative 03/30/2025    KETUR Negative 03/30/2025    BLOODURINE 2+ (A) 03/30/2025    PHURINE 6.5 03/30/2025    PROUR 100 (A) 03/30/2025    UROBILINOGEN Normal 03/30/2025    NITRITE Negative 03/30/2025    LEUUR Negative 03/30/2025    WBCUR 1-5 03/30/2025    RBCUR 0-2 03/30/2025    EPIUR Few (A) 03/30/2025    BACUR None Seen 03/30/2025    CAOXUR Occasional (A) 04/20/2018    HYLUR Present (A) 05/14/2019         IMAGING:     Reviewed.      ASSESSMENT/PLAN:   45 yo M with history of ESRD on iHD MWF via LUE AVF, HTN, severe MR s/p MVR x 2, HFpEF, DVT/PE, TIA, MGUS, bipolar disorder, recurrent GI bleed, diffuse body pains presents with SOB, CP, cough.    ESRD:  -- HD today 2-3L UF as tolerated, then resume HD tomorrow per outpatient schedule  -- avoid morphine  -- gabapentin should be renally dosed - defer to RPh/primary service     Anemia:  -- in the setting of bleeding/ESRD  -- epo with HD if BP lower; not today     MBD:  -- sevelamer 2400 TID AC  -- calcium acetate 667 TIDAC  -- low phos diet     HTN:  -- coreg and losartan pre-HD if SBP>180, otherwise hold  -- clonidine should be continued BID to avoid rebound  -- takes remaining antihypertensives after HD unless SBP>180 per above     LUE AVF:  -- L arm precautions     Rectal bleeding:  -- colonoscopy with pan-diverticulosis, hemorrhoids and polyps recently    Ok to discharge from renal perspective after HD today.       Thank you for allowing me to participate in the care of your patient. Please do not hesitate to contact me with concerns or questions.    Lenka Bond MD  [facility]         [1]   Allergies  Allergen Reactions    Hydrochlorothiazide RASH and HIVES

## 2025-03-30 NOTE — PLAN OF CARE
Patient alert and oriented x 4. On 2L NC; comfort. Up with SBA. NSR/ST on tele. Continent of bowel/bladder. Complains of SOB/pain; prn pain meds given. POC: HD today, monitor BP, pain control. Call light within reach. Fall precautions in place.     Problem: Diabetes/Glucose Control  Goal: Glucose maintained within prescribed range  Description: INTERVENTIONS:- Monitor Blood Glucose as ordered- Assess for signs and symptoms of hyperglycemia and hypoglycemia- Administer ordered medications to maintain glucose within target range- Assess barriers to adequate nutritional intake and initiate nutrition consult as needed- Instruct patient on self management of diabetes  3/30/2025 1008 by Nikole Nava, RN  Outcome: Progressing  3/30/2025 1007 by Nikole Nava, RN  Outcome: Progressing     Problem: Patient/Family Goals  Goal: Patient/Family Long Term Goal  Description: Patient's Long Term Goal: to go home   Interventions:  -nephrology to see  -monitor labs  See additional Care Plan goals for specific interventions  3/30/2025 1008 by Nikole Nava, RN  Outcome: Progressing  3/30/2025 1007 by Nikole Nava, RN  Outcome: Progressing  Goal: Patient/Family Short Term Goal  Description: Patient's Short Term Goal: to go home  Interventions:  -HD as ordered  -monitor BP  See additional Care Plan goals for specific interventions  3/30/2025 1008 by Nikole Nava, RN  Outcome: Progressing  3/30/2025 1007 by Nikole Nava, RN  Outcome: Progressing

## 2025-03-30 NOTE — H&P
[facility] Hospitalist History and Physical      Chief Complaint   Patient presents with    Difficulty Breathing    Chest Pain Angina        PCP: Adrian Small MD      History of Present Illness: Patient is a 44 year old male with PMH sig for ESRD on MWF HD, hemorrhoids, diverticulosis, hypertension, bipolar disorder who presents for chest pain and shortness of breath. Recent admission for rectal bleeding attributed to hemorrhoids for which he needs to follow up with surgery for. He missed his scheduled dialysis session this past Friday as he underwent dialysis the day before when he was hospitalized. Now c/o chest pain and shortness of breath, states he typically has these sx when he misses dialysis and his blood pressure is elevated. Also endorsing abdominal pain which is chronic.     Work up significant for elevated blood pressure, BNP 48515, high sensitivity troponin 337. CXR demonstrates right sided opacity improved from previous study.  CTA negative for PE, significant for small right pleural effusion and bronchial wall thickening. Nephrology consulted.     Past Medical History:    Anxiety state    Arrhythmia    Asthma ([facility])    Attention deficit hyperactivity disorder (ADHD)    Back problem    Bipolar 1 disorder ([facility])    CKD (chronic kidney disease) stage 3, GFR 30-59 ml/min ([facility])    Dr Meeks    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[facility])    Congestive heart disease (HCC)    COPD (chronic obstructive pulmonary disease) ([facility])    Coronary atherosclerosis    Deep vein thrombosis ([facility])    at age 17 R/T cast    Depression    Diabetes ([facility])    Dialysis patient    Diverticulosis of large intestine    Essential hypertension    3/21 echo: severe concentric LVH with normal EF and no MR or pHTN    Extrinsic asthma, unspecified    Heart attack (HCC)    2016- angiogram- no intervention    Heart valve disease    mitral valve repair in 1994/    High blood pressure    High cholesterol    History of blood transfusion     History of mitral valve repair    Hyperlipidemia    Low back pain    tight and stiff after sweeping and mopping    LVH (left ventricular hypertrophy)    Migraines    Mixed hyperlipidemia     HDL 38 LDL 97 VLDL 57     Monoclonal gammopathy    IgG kappa     Muscle weakness    MVP (mitral valve prolapse)    Repair 1994 at [facility]; echoes as recently as 3/21 show mild or trivial MR and no stenosis    Neuropathy    Osteoarthritis    hip ,knees    Pneumonia due to organism    Pulmonary embolism (HCC)    Renal disorder    Stroke (HCC)    TIA (transient ischemic attack)    Initial history of left-sided weakness and slurred speech. (+) cocaine. MRI of the brain, CT angiogram of the head and neck, and 2D echo are all unremarkable.     TMJ (dislocation of temporomandibular joint)    Troponin level elevated    Trop 60 60 47 with TIA and no CP: Lexiscan negative with EF 51    Visual impairment      Past Surgical History:   Procedure Laterality Date    Av fistula revision, open Left     Cabg      Colonoscopy N/A 03/26/2023    Procedure: COLONOSCOPY;  Surgeon: Heath Vu MD;  Location:  ENDOSCOPY    Colonoscopy N/A 12/30/2023    Procedure: COLONOSCOPY with cold snare polypectomy and forcep polypectomy;  Surgeon: Ousmane Suarez MD;  Location:  ENDOSCOPY    Colonoscopy N/A 3/9/2025    Procedure: COLONOSCOPY WITH COLD SNARE POLYPECTOMY AND ENDO CLIPS X 2;  Surgeon: Bakari Noguera MD;  Location:  ENDOSCOPY    Colonoscopy & polypectomy  2019    [facility]  2019    Duodenitis. Biopsied. EUS for weight loss was negative    Heart surgery      Hernia surgery  08/17/2022    Dr Barnes    Laminectomy,>2 sgmt,lumbar  09/06/2018    L4-L5 Decomp Discectomy ROEM L4-L5    Mitralplasty w cp bypass  1994    [facility]: Repair    Repair rotator cuff,chronic Left     torn and had a ruptured bicep    Sinus surgery        Spine surgery procedure unlisted      Valve repair  1994    mitral valve        ALL:  Allergies[1]     No current  outpatient medications on file.       Social History     Tobacco Use    Smoking status: Former     Current packs/day: 0.00     Average packs/day: 1 pack/day for 27.0 years (27.0 ttl pk-yrs)     Types: Cigarettes     Start date: 2/28/1995     Quit date: 2/28/2022     Years since quitting: 3.0     Passive exposure: Never    Smokeless tobacco: Never   Substance Use Topics    Alcohol use: No        Fam Hx  Family History   Problem Relation Age of Onset    Hypertension Father     Alcohol and Other Disorders Associated Father     Substance Abuse Father         cocaine    Dementia Father     Cancer Father         lung    Diabetes Mother     Cancer Mother         multiple myeloma    Hypertension Mother     Anxiety Maternal Aunt     Depression Maternal Aunt     Anxiety Maternal Aunt     Depression Maternal Aunt     Bipolar Disorder Maternal Aunt     Diabetes Maternal Grandmother     Hypertension Maternal Grandmother     Cancer Maternal Grandfather         stomach cancer    Diabetes Maternal Grandfather     Hypertension Maternal Grandfather     Alcohol and Other Disorders Associated Maternal Grandfather     Hypertension Paternal Grandmother     Hypertension Paternal Grandfather     Cancer Sister         uterine and ovarian    Hypertension Sister     Cancer Maternal Uncle         lung    Cancer Paternal Aunt         throat       Review of Systems  Comprehensive ROS reviewed and negative except for what is stated in HPI.      OBJECTIVE:  BP (!) 169/129 (BP Location: Right arm)   Pulse 80   Temp 97.8 °F (36.6 °C) (Oral)   Resp 15   Ht 6' 2\" (1.88 m)   Wt 248 lb 0.3 oz (112.5 kg)   SpO2 95%   BMI 31.84 kg/m²   Physical Exam:  General: Alert, awake, cooperative.  HEENT:  Normocephalic, atraumatic.  Neck:  Trachea midline.  No supple.   Chest:  Clear to auscultation bilaterally. No wheezes, rales, or rhonchi.  CV:  Regular rate and rhythm.  Positive S1/S2.   GI: Bowel sounds present in all four quadrants, abdomen is soft,  non-distended.  Extremities:  No lower extremity edema or cyanosis.  Neurological:  AAOx3. Moving all extremities.   Skin:  Warm and dry.        Data Review:    LABS:   Lab Results   Component Value Date    WBC 7.6 03/29/2025    HGB 8.5 03/29/2025    HCT 24.3 03/29/2025    .0 03/29/2025    CREATSERUM 8.33 03/29/2025    BUN 56 03/29/2025     03/29/2025    K 3.9 03/29/2025     03/29/2025    CO2 22.0 03/29/2025     03/29/2025    CA 9.6 03/29/2025    ALB 4.2 03/29/2025    ALKPHO 107 03/29/2025    BILT 0.6 03/29/2025    TP 7.6 03/29/2025    AST 40 03/29/2025    ALT 46 03/29/2025    PTT 28.7 03/29/2025    INR 1.08 03/29/2025    PTP 13.8 03/29/2025    LIP 74 03/29/2025    DDIMER 2.40 03/29/2025    MG 2.2 03/29/2025       CXR: image personally reviewed.  Right airspace opacity.     Radiology: CTA CHEST + CT ABD (W) + CT PEL (W) (CPT=71275/77641)    Result Date: 3/29/2025  PROCEDURE:  CTA CHEST + CT ABD (W) + CT PEL (W) SH(CPT=71275/50068)  COMPARISON:  [facility], CT, CT ABDOMEN+PELVIS(CONTRAST ONLY)(CPT=74177), 3/14/2025, 11:43 PM.  INDICATIONS:  SOB., abd pain and chest pain  TECHNIQUE:  CT of the chest (with CTA imaging), abdomen, and pelvis were obtained post- injection of non-ionic intravenous contrast material. Multi-planar reformatted/3-D images were created to optimize visualization of vascular anatomy.  Dose reduction techniques were used. Dose information is transmitted to the ACR (American College of Radiology) NRDR (National Radiology Data Registry) which includes the Dose Index Registry.  PATIENT STATED HISTORY:(As transcribed by Technologist)  The patient has shortness of breath with chest and abdominal pain. Patient is ESRD and approved for injection by nephrology.   CONTRAST USED:  100cc of Isovue 370  FINDINGS:   CHEST:   LUNGS/PLEURA:  Continued small right pleural effusion without left pleural effusion.  Multiple areas of paraseptal emphysema seen lungs bilaterally.  No  pneumothorax seen.  Redemonstration scarring and chronic atelectasis in the mid-lower lung on the right.  Similar to the prior CT from a few days ago.  Bronchial wall thickening is present.  No sign of bronchiectasis.  THORACIC VASCULAR:  No sign of acute pulmonary embolism.  No filling defects seen centrally or peripherally in the pulmonary arteries.  Thoracic aorta normal caliber, without sign of aneurysm, no sign of thoracic aortic dissection.  MEDIASTINUM/BASIL:  Mildly enlarged mediastinal and right hilar lymph nodes.  CARDIAC:  Unremarkable.  CHEST WALL:  Unremarkable.  OTHER:  None.  ABDOMEN/PELVIS:  LIVER:  Unremarkable.  BILIARY:  Unremarkable.  PANCREAS:  Unremarkable.  SPLEEN:  Unremarkable.  KIDNEYS:  No acute abnormality.  ADRENALS:  Unremarkable.  AORTA/VASCULAR:  No aortic aneurysm.  RETROPERITONEUM:  Unremarkable.  BOWEL/MESENTERY: No acute process.  No excessive stool in the colon, sign of colitis, diverticulitis, bowel obstruction, free air, large volume ascites, mesenteric adenopathy, mesenteric edema.  Normal appendix seen.  ABDOMINAL WALL:  Unremarkable.  URINARY BLADDER:  Urinary bladder wall thickening.  PELVIC NODES:  Unremarkable.  PELVIC ORGANS:  No acute process.  OTHER:  None.  SKELETAL STRUCTURES IN THE CHEST/ABDOMEN/PELVIS:  BONES:  No acute abnormality.             CONCLUSION:  Thickening urinary bladder wall correlate for cystitis.  No acute pulmonary embolism.  Small right pleural effusion and areas of pleural and parenchymal scarring redemonstrated in the right mid-lower lung.  Bronchial wall thickening suspect bronchitis.   LOCATION:  [facility]   Dictated by (CST): Joe London MD on 3/29/2025 at 5:06 PM     Finalized by (CST): Joe London MD on 3/29/2025 at 5:10 PM       XR CHEST AP PORTABLE  (CPT=71045)    Result Date: 3/29/2025  PROCEDURE:  XR CHEST AP PORTABLE  (CPT=71045)  TECHNIQUE:  AP chest radiograph was obtained.  COMPARISON:  [facility], XR, XR CHEST AP/PA (1 VIEW)  (CPT=71045), 3/26/2025, 2:24 AM.  [facility], XR, XR CHEST AP PORTABLE  (CPT=71045), 3/18/2025, 1:00 AM.  INDICATIONS:  SOB., abd pain and chest pain  PATIENT STATED HISTORY: (As transcribed by Technologist)  Patient states he has shortness of breath for 1 week that worsened today and left sided chest pain. History of asthma and pneumonia.    FINDINGS:  The suspect trace right pleural effusion.  There is patchy right lung airspace opacity which appears improved in comparison to 3/26/2025, likely reflecting pneumonia/aspiration in the appropriate clinical setting.  No pneumothorax.  Cardiomediastinal silhouette is stable.  Cardiac valvular replacement.            CONCLUSION:  See above   LOCATION:  [facility]      Dictated by (CST): Duy De La Rosa MD on 3/29/2025 at 2:43 PM     Finalized by (CST): Duy De La Rosa MD on 3/29/2025 at 2:44 PM       XR CHEST AP/PA (1 VIEW) (CPT=71045)    Result Date: 3/26/2025  PROCEDURE:  XR CHEST AP/PA (1 VIEW) (CPT=71045)  TECHNIQUE:  AP chest radiograph was obtained.  COMPARISON:  [facility], XR, XR CHEST AP PORTABLE  (CPT=71045), 3/18/2025, 1:00 AM.  INDICATIONS:  Rectal bleeding, abdominal pain, SOB  PATIENT STATED HISTORY: (As transcribed by Technologist)  Generalized weakness and shortness of breath.              CONCLUSION:  Patchy infiltrate and effusion in the right midlung and base.  Findings concerning for persistent pneumonia.  Heart size and pulmonary vascularity within normal limits.   LOCATION:  [id number]      Dictated by (CST): Arlyn Oglesby MD on 3/26/2025 at 8:14 AM     Finalized by (CST): Arlyn Oglesby MD on 3/26/2025 at 8:14 AM       CT BRAIN OR HEAD (CPT=70450)    Result Date: 3/26/2025  PROCEDURE:  CT BRAIN OR HEAD (58289)  COMPARISON:  None.  INDICATIONS:  Rectal bleeding, abdominal pain, SOB  TECHNIQUE:  Noncontrast CT scanning is performed through the brain. Dose reduction techniques were used. Dose information is transmitted to the ACR (American College of  Radiology) NRDR (National Radiology Data Registry) which includes the Dose Index Registry.  PATIENT STATED HISTORY: (As transcribed by Technologist)  Generalized weakness and shortness of breath.    FINDINGS:   VENTRICLES/SULCI:   Ventricles and sulci are normal in size.   INTRACRANIAL:  There are no abnormal extraaxial fluid collections.  There is no midline shift. No evidence of acute intracranial hemorrhage.  SINUSES:  Mild mucosal thickening in the ethmoid air cells and maxillary sinuses.  No significant inflammatory changes.  MASTOIDS:  The mastoids are clear.  SKULL:  No depressed calvarial fracture.            CONCLUSION:  No CT evidence for acute intracranial process.   LOCATION:  [id number]   Dictated by (CST): Arlyn Oglesby MD on 3/26/2025 at 6:18 AM     Finalized by (CST): Arlyn Oglesby MD on 3/26/2025 at 6:19 AM       CARD ECHO 2D DOPPLER (CPT=93306)    Result Date: 3/20/2025  Transthoracic Echocardiogram Name:Godwin Fonseca Date: 2025 :  1980 Ht:  (74in)  BP: 151 / 118 MRN:  [id number]    Age:  44years    Wt:  (257lb) HR: 95bpm Loc:  [facility]       Gndr: M          BSA: 2.42m^2 Sonographer: Jhonatan [facility] Ordering:    Benja Reyes MD Consulting:  Samantha Muñoz ---------------------------------------------------------------------------- History/Indications:   Dyspnea. Blood tests:    Troponin elevated. ---------------------------------------------------------------------------- Procedure information:  A transthoracic complete 2D study was performed. Additional evaluation included M-mode, complete spectral Doppler, and color Doppler.  Patient status:  Inpatient.  Location:  Bedside.    Comparison was made to the study of 2025.    This was a routine study. Transthoracic echocardiography for ventricular function evaluation and assessment of valvular function. Image quality was adequate. ECG rhythm:   Normal sinus ----------------------------------------------------------------------------  Conclusions: 1. Left ventricle: The cavity size was normal. Wall thickness was moderately    increased. There was concentric hypertrophy. Systolic function was    normal. The estimated ejection fraction was 60-65%, by visual assessment. 2. Left atrium: The atrium was markedly dilated. The left atrial volume was    markedly increased. 3. Right atrium: The atrium was mildly dilated. 4. Mitral valve: A prosthetic device is present. The prosthesis had a normal    range of motion. The sewing ring appeared normal, had no rocking motion,    and showed no evidence of dehiscence. Transvalvular velocity was    increased. The findings were consistent with mild stenosis. There was    moderate regurgitation. The mean diastolic gradient was 6mm Hg at heart    rate of 91 bpm. Impressions:  This study is compared with previous dated 01/18/2025: * ---------------------------------------------------------------------------- * Findings: Left ventricle:  The cavity size was normal. Wall thickness was moderately increased. There was concentric hypertrophy. Systolic function was normal. The estimated ejection fraction was 60-65%, by visual assessment. Left atrium:  The atrium was markedly dilated. The left atrial volume was markedly increased. Right ventricle:  The cavity size was normal. Systolic function was normal. Right atrium:  The atrium was mildly dilated. Mitral valve:  A prosthetic device is present. The prosthesis had a normal range of motion. The sewing ring appeared normal, had no rocking motion, and showed no evidence of dehiscence. Leaflet separation was normal.  Doppler: Transvalvular velocity was increased. The findings were consistent with mild stenosis. There was moderate regurgitation.    The mean diastolic gradient was 6mm Hg at heart rate of 91 bpm. Aortic valve:  The valve was structurally normal. The valve was trileaflet. Cusp separation was normal.  Doppler:  Transvalvular velocity was within the normal range.  There was no evidence for stenosis. There was no significant regurgitation. Tricuspid valve:  The valve is structurally normal. Leaflet separation was normal.  Doppler:  Transvalvular velocity was within the normal range. There was no evidence for stenosis. There was no significant regurgitation. Pulmonic valve:   The valve is structurally normal. Cusp separation was normal.  Doppler:  Transvalvular velocity was within the normal range. There was no evidence for stenosis. There was no significant regurgitation. Pericardium:   There was no pericardial effusion. Aorta: Aortic root: The aortic root was normal. Systemic veins:  Central venous respirophasic diameter changes are in the normal range (>50%). Inferior vena cava: The IVC was normal-sized. ---------------------------------------------------------------------------- Measurements  Left ventricle         Value        Ref       01/18/2025  IVS thickness, ED, (H) 1.4   cm     0.6 - 1.0 1.7  PLAX  LV ID, ED, PLAX        5.3   cm     4.2 - 5.8 4.9  LV ID, ES, PLAX        3.5   cm     2.5 - 4.0 3.3  LV PW thickness,   (H) 1.5   cm     0.6 - 1.0 1.5  ED, PLAX  IVS/LV PW ratio,       0.94         --------- 1.14  ED, PLAX  LV PW/LV ID ratio,     0.28         --------- 0.3  ED, PLAX  LV ejection            62    %      52 - 72   63  fraction  LV e', lateral     (L) 9.9   cm/sec >=10.0    ----------  LVOT                   Value        Ref       01/18/2025  LVOT peak              1.16  m/sec  --------- ----------  velocity, S  LVOT VTI, S            20.4  cm     --------- ----------  LVOT peak              5     mm Hg  --------- ----------  gradient, S  LVOT mean              3     mm Hg  --------- ----------  gradient, S  Aortic root            Value        Ref       01/18/2025  Aortic root ID, ED     3.8   cm     3.0 - 4.4 ----------  Ascending aorta        Value        Ref       01/18/2025  Ascending aorta        3.1   cm     2.2 - 3.8 3.5  ID, A-P, ED  Left atrium             Value        Ref       01/18/2025  LA ID, A-P, ES     (H) 5.5   cm     3.0 - 4.0 6.1  LA volume, S       (H) 240   ml     18 - 58   175  LA volume/bsa, S   (H) 99    ml/m^2 16 - 34   73  LA volume, ES, 1-p (H) 205   ml     18 - 58   173  A4C  LA volume, ES, 1-p (H) 236   ml     18 - 58   176  A2C  LA volume, ES, A/L     251   ml     --------- 189  LA volume/bsa, ES, (H) 104   ml/m^2 16 - 34   79  A/L  LA/aortic root         1.45         --------- ----------  ratio  Mitral valve           Value        Ref       01/18/2025  Mitral mean            6     mm Hg  --------- 10  gradient, D  Mitral peak            13    mm Hg  --------- 18  gradient, D  Mitral regurg VTI,     32.6  cm     --------- 34.2  PISA  Right ventricle        Value        Ref       01/18/2025  TAPSE, 2D              2.20  cm     >=1.70    1.97  RV s', lateral         13.7  cm/sec >=9.5     ---------- Legend: (L)  and  (H)  fahad values outside specified reference range. ---------------------------------------------------------------------------- Prepared and electronically signed by Cyrus Jacobs MD 03/20/2025 16:12     XR CHEST AP PORTABLE  (CPT=71045)    Result Date: 3/18/2025  PROCEDURE:  XR CHEST AP PORTABLE  (CPT=71045)  TECHNIQUE:  AP chest radiograph was obtained.  COMPARISON:  [facility], CT, CT ABDOMEN+PELVIS(CONTRAST ONLY)(CPT=74177), 3/14/2025, 11:43 PM.  [facility], XR, XR CHEST AP PORTABLE  (CPT=71045), 3/14/2025, 10:13 PM.  INDICATIONS:  SOB that started this evening (had dialysis today) also reports GI bleeding  PATIENT STATED HISTORY: (As transcribed by Technologist)  Patient reports shortness of breath that started this evening.    FINDINGS:  Cardiomediastinal silhouette is stable in size and appearance.  Increasing right lower lobe opacity.  Right pleural effusion cannot be excluded.  No pneumothorax.            CONCLUSION:  1. When compared to 3/14/2025 chest radiograph, increasing right lower lobe opacity may represent  pleural effusion with atelectasis/consolidation, correlate clinically.  ED M.D. notified of these findings with preliminary radiology report from [facility] services.    LOCATION:  [facility]      Dictated by (CST): Nuha Alfonso MD on 3/18/2025 at 8:14 AM     Finalized by (CST): Nuha Alfonso MD on 3/18/2025 at 8:16 AM       CT ABDOMEN+PELVIS(CONTRAST ONLY)(CPT=74177)    Result Date: 3/15/2025  PROCEDURE:  CT ABDOMEN+PELVIS (CONTRAST ONLY) (CPT=74177)  COMPARISON:  [facility], CT, CT ABDOMEN+PELVIS(CONTRAST ONLY)(CPT=74177), 3/08/2025, 11:09 PM.  [facility] , CT, CT ABDOMEN+PELVIS(CONTRAST ONLY)(CPT=74177), 3/05/2025, 12:59 PM.  [facility], CT, CT ABDOMEN+PELVIS(CPT=74176), 1/06/2025, 6:25 AM.  [facility],  CT, CT ABDOMEN+PELVIS(CONTRAST ONLY)(CPT=74177), 12/25/2024, 5:09 PM.  INDICATIONS:  abd pain s/p colonoscopy  TECHNIQUE:  CT scanning was performed from the dome of the diaphragm to the pubic symphysis with non-ionic intravenous contrast material. Post contrast coronal MPR imaging was performed.  Dose reduction techniques were used. Dose information is transmitted to the ACR (American College of Radiology) NRDR (National Radiology Data Registry) which includes the Dose Index Registry.  PATIENT STATED HISTORY:(As transcribed by Technologist)   abd pain s/p colonoscopy   CONTRAST USED:  100cc of Isovue 370  FINDINGS:  LIVER:  No enlargement, atrophy, abnormal density, or significant focal lesion.  BILIARY:  No visible dilatation or calcification.  PANCREAS:  No lesion, fluid collection, ductal dilatation, or atrophy.  SPLEEN:  No enlargement or focal lesion.  KIDNEYS:  No mass, obstruction, or calcification.  ADRENALS:  No mass or enlargement.  AORTA/VASCULAR:  No aneurysm or dissection.  RETROPERITONEUM:  No mass or adenopathy.  BOWEL/MESENTERY:  Diverticular disease throughout the colon without evidence of diverticulitis.  The appendix is normal.  The visualized small bowel is unremarkable.  There is no abscess or free air.  ABDOMINAL WALL:  No mass or hernia.  URINARY BLADDER:  Urinary bladder demonstrates wall thickening which can be seen with cystitis and correlation with urinalysis recommended. PELVIC NODES:  No adenopathy.  PELVIC ORGANS:  No visible mass.  Pelvic organs appropriate for patient age.  BONES:  No bony lesion or fracture.  Moderate degenerative disc disease at L4-5 and L5-S1. LUNG BASES:  Trace chronic fluid at the right lung base and pleural thickening with scarring and atelectasis at the right lung base. OTHER:  Negative.             CONCLUSION:   1. Diverticulosis of the colon without evidence of diverticulitis.  2. Bladder wall thickening is chronic and stable can be seen with cystitis.  Correlation with urinalysis recommended.  3. Stable chronic pleural thickening in fluid as well as scarring at the right lung base.   LOCATION:  [facility]   Dictated by (CST): Jaycob Cassidy MD on 3/15/2025 at 0:32 AM     Finalized by (CST): Jaycob Cassidy MD on 3/15/2025 at 0:36 AM       XR CHEST AP PORTABLE  (CPT=71045)    Result Date: 3/14/2025  PROCEDURE:  XR CHEST AP PORTABLE  (CPT=71045)  TECHNIQUE:  AP chest radiograph was obtained.  COMPARISON:  [facility], XR, XR CHEST AP PORTABLE  (CPT=71045), 1/07/2025, 3:01 AM.  [facility], CT, CT ABDOMEN+PELVIS(CPT=74176), 1/06/2025, 6:25 AM.  [facility], CT, CT ABDOMEN+PELVIS(CONTRAST ONLY)(CPT=74177), 3/08/2025, 11:09 PM.  [facility] , XR, XR CHEST AP PORTABLE  (CPT=71045), 1/18/2025, 10:06 AM.  [facility], CT, CT ABDOMEN+PELVIS(CONTRAST ONLY)(CPT=74177), 3/08/2025, 11:09 [facility], [facility]R, XR CHEST AP PORTABLE  (CPT=71045), 3/04/2025, 5:40 PM.  INDICATIONS:  shortness of breath  PATIENT STATED HISTORY: (As transcribed by Technologist)  Patient here with shortness of breath, states he feels like fluid overload. Dialysis three times this week. Patient has abdominal pain. Also complains of rectal bleeding.    FINDINGS:  Patchy airspace opacity at the right lung base has been seen on multiple  previous x-rays may represent areas of scarring or atelectasis.  There may be small amount of superimposed pneumonitis.  There is no definite pleural effusion.  The left lung is clear.  Heart is normal in size.  Cardiac valve is stable.            CONCLUSION:  Mild patchy airspace opacity at the right lung base some of which is chronic representing scarring or atelectasis with some increased overall opacity could represent superimposed pneumonitis or pneumonia.   LOCATION:  [facility]      Dictated by (CST): Jaycob Cassidy MD on 3/14/2025 at 10:40 PM     Finalized by (CST): Jaycob Cassidy MD on 3/14/2025 at 10:42 PM       CT ABDOMEN+PELVIS(CONTRAST ONLY)(CPT=74177)    Result Date: 3/8/2025  PROCEDURE:  CT ABDOMEN+PELVIS (CONTRAST ONLY) (CPT=74177)  COMPARISON:  [facility] , CT, CT ABDOMEN+PELVIS(CONTRAST ONLY)(CPT=74177), 3/05/2025, 12:59 PM.  [facility], CT, CT ABDOMEN+PELVIS(CPT=74176), 1/06/2025, 6:25 AM.  [facility], CT, CT ABDOMEN+PELVIS(CONTRAST ONLY)(CPT=74177), 12/25/2024, 5:09 PM.  [facility],  CT, CT ABDOMEN+PELVIS(CPT=74176), 11/16/2024, 2:33 PM.  INDICATIONS:  rectal bleeding, abdominal pain  TECHNIQUE:  CT scanning was performed from the dome of the diaphragm to the pubic symphysis with non-ionic intravenous contrast material. Post contrast coronal MPR imaging was performed.  Dose reduction techniques were used. Dose information is transmitted to the ACR (American College of Radiology) NRDR (National Radiology Data Registry) which includes the Dose Index Registry.  PATIENT STATED HISTORY:(As transcribed by Technologist)  Mid to lower bilateral abdominal pain and rectal bleeding.   CONTRAST USED:  100cc of Isovue 370  FINDINGS:  LIVER:  No enlargement, atrophy, abnormal density, or significant focal lesion.  BILIARY:  No visible dilatation or calcification.  PANCREAS:  No lesion, fluid collection, ductal dilatation, or atrophy.  SPLEEN:  No enlargement or focal lesion.  KIDNEYS:  No mass, obstruction, or  calcification.  Tiny cyst right midpole measures 4 mm.  Tiny cyst in the left midpole measures 5 mm. ADRENALS:  No mass or enlargement.  AORTA/VASCULAR:  No aneurysm or dissection.  RETROPERITONEUM:  No mass or adenopathy.  BOWEL/MESENTERY:  There is diverticular disease throughout the entire colon.  There is no evidence of acute diverticulitis.  The appendix is unremarkable.  Visualized small bowel is within normal limits. ABDOMINAL WALL:  No mass or hernia.  URINARY BLADDER:  Urinary bladder demonstrates wall thickening which is diffuse could be seen in cystitis and correlation with urinalysis is recommended. PELVIC NODES:  No adenopathy.  PELVIC ORGANS:  No visible mass.  Pelvic organs appropriate for patient age.  BONES:  No bony lesion or fracture.  Moderate degenerative changes at the lower lumbar spine. LUNG BASES:  There is small pleural effusion and/or pleural thickening involving the right mid and lower lung which is chronic. OTHER:  Negative.             CONCLUSION:   1. Diverticulosis throughout the colon without ends of diverticulitis.   2.  Continued bladder wall thickening can be seen in cystitis.  3. Chronic pleural thickening and trace effusion at the right lung base.   LOCATION:  [facility]   Dictated by (CST): Jaycob Cassidy MD on 3/08/2025 at 11:29 PM     Finalized by (CST): Jaycob Cassidy MD on 3/08/2025 at 11:33 PM       CT BRAIN OR HEAD (CPT=70450)    Result Date: 3/5/2025  PROCEDURE:  CT BRAIN OR HEAD (93944)  COMPARISON:  [facility], CT, CT BRAIN OR HEAD (25920), 7/25/2015, 11:27 AM.  [facility], CT, CT BRAIN OR HEAD (10963), 11/06/2024, 1:13 PM.  [facility] , CT, CT BRAIN OR HEAD (80399), 1/19/2025, 11:07 AM.  INDICATIONS:  headache and high BP  TECHNIQUE:  Noncontrast CT scanning is performed through the brain. Dose reduction techniques were used. Dose information is transmitted to the ACR (American College of Radiology) NRDR (National Radiology Data Registry) which includes the Dose Index  Registry.  PATIENT STATED HISTORY: (As transcribed by Technologist)  Headache    FINDINGS:  Ventricles and sulci are within normal limits.  There is no focal parenchymal attenuation abnormality.  There is no midline shift or mass-effect.  The basal cisterns are patent.  The gray-white matter differentiation is intact.  There is no acute intracranial hemorrhage or extra-axial fluid collection.   There is no evident fracture.  The visualized paranasal sinuses and mastoid air cells are unremarkable.  There is mild fullness at the tip of the basilar artery.             CONCLUSION:   1. No acute intracranial hemorrhage or hydrocephalus. If there is clinical concern for acute ischemia/infarction, an MRI of the brain would be recommended for further evaluation.  2. There is mild fullness of the tip of the basilar artery.  This finding is nonspecific and likely artifactual, however, a nonemergent head MRA is recommended to exclude a basilar tip aneurysm.    LOCATION:  [id number]   Dictated by (CST): Stromberg, LeRoy, MD on 3/05/2025 at 1:27 PM     Finalized by (CST): Stromberg, LeRoy, MD on 3/05/2025 at 1:33 PM       CT ABDOMEN+PELVIS(CONTRAST ONLY)(CPT=74177)    Result Date: 3/5/2025  PROCEDURE:  CT ABDOMEN+PELVIS (CONTRAST ONLY) (CPT=74177)  COMPARISON:  None.  INDICATIONS:  Patient presents with diffuse abdominal pain.  TECHNIQUE:  CT scanning was performed from the dome of the diaphragm to the pubic symphysis with non-ionic intravenous contrast material. Post contrast coronal MPR imaging was performed.  Dose reduction techniques were used. Dose information is transmitted to the ACR (American College of Radiology) NRDR (National Radiology Data Registry) which includes the Dose Index Registry.  PATIENT STATED HISTORY:(As transcribed by Technologist)  Abdominal pain   CONTRAST USED:  100cc of Isovue 370  FINDINGS:  LIVER:  No enlargement, atrophy, abnormal density, or significant focal lesion.  BILIARY:  No visible dilatation  or calcification.  PANCREAS:  No lesion, fluid collection, ductal dilatation, or atrophy.  SPLEEN:  No enlargement or focal lesion.  KIDNEYS:  No renal stones or hydronephrosis.  There is a small simple low-density cyst suggested along the midpole of the left kidney measuring 9 mm.  This is consistent with a benign cyst and no further workup is required or recommended.  ADRENALS:  No mass or enlargement.  AORTA/VASCULAR:  No aneurysm or dissection.  RETROPERITONEUM:  No mass or adenopathy.  BOWEL/MESENTERY:  No visible mass, obstruction, or bowel wall thickening.  Normal appendix.  There are scattered diverticular changes of the colon.  No evidence of acute diverticulitis or colitis. ABDOMINAL WALL:  No mass or hernia.  URINARY BLADDER:  There is mild bladder wall thickening, suggestive of cystitis.  No focal bladder lesions. PELVIC NODES:  No adenopathy.  PELVIC ORGANS:  No visible mass.  Pelvic organs appropriate for patient age.  BONES:  No bony lesion or fracture.  LUNG BASES:  There is a mild right effusion with passive atelectatic changes and/or scarring of the right lung base.            CONCLUSION:  1. There is mild diffuse bladder wall thickening suspicious for cystitis. 2. There is a mild right effusion with passive atelectatic changes and/or scarring of the right lung base. 3. Please see the body of the report above for further, less significant details.   LOCATION:  [facility]   Dictated by (CST): Mariam Hollis DO on 3/05/2025 at 1:29 PM     Finalized by (CST): Mariam Hollis DO on 3/05/2025 at 1:33 PM       XR CHEST AP PORTABLE  (CPT=71045)    Result Date: 3/4/2025  PROCEDURE:  XR CHEST AP PORTABLE  (CPT=71045)  TECHNIQUE:  AP chest radiograph was obtained.  COMPARISON:  [facility] , XR, XR CHEST AP PORTABLE  (CPT=71045), 2/02/2025, 8:18 AM.  INDICATIONS:  nvd since sunday. missed dialysis Monday  PATIENT STATED HISTORY: (As transcribed by Technologist)  Pt c/o nasuea,vomiting and diarrhea since 3/2/25.     FINDINGS:  Lung volumes are satisfactory.  New patchy opacities are seen in the mid to lower right lung with obscuration of the majority of the diaphragm contour.  The left lung remains clear.  No definite new pleural effusion.  Heart and pulmonary vessels appear stable, normal caliber.  Valve prosthetic ring noted.  The dialysis catheter is no longer visualized.  No pneumothorax.               CONCLUSION:  New airspace disease at the right lung base.  Correlate for pneumonia.  Continued clinical correlation and follow-up to radiographic resolution recommended.   LOCATION:  [facility]      Dictated by (CST): Jani Vasquez MD on 3/04/2025 at 6:18 PM     Finalized by (CST): Jani Vasquez MD on 3/04/2025 at 6:19 PM          Assessment/Plan:   44 year old male with PMH sig for ESRD on MWF HD, hemorrhoids, diverticulosis, hypertension, bipolar disorder who presents for chest pain and shortness of breath.    #ESRD on HD MWF  #Chronic anemia 2/2 above  #Hypertensive urgency  #cHTN  #Chest pain  #Elevated troponin 2/2 renal dysfunction  -Nephrology consulted for HD  -Flat troponin, chronically elevated due to decreased renal clearance. CP likely from wall stress in setting of elevated BP. as in the past.. Evaluated by cardiology recently. Echo 3/20 EF 60-65%, no WMA.  -Resume home nifedipine, losartan, hydralazine, coreg. IV labetalol as needed.     #Rectal bleeding 2/2 hemorrhoids  -Evaluated by GI and general surgery-- no acute inpatient intervention required. To follow up outpatient for elective hemorrhoidectomy.     #Bipolar disorder  -Continue bupropion, lamictal    #HLD  -statin    #HFpEF, chronic  -Optimize volume status w/ HD    DVT ppx: Heparin subcu  Code Status: Full code      Addendum: Patient okay for discharge per nephrology upon completion of HD. Resume outpatient HD as scheduled.     Outpatient records or previous hospital records reviewed.   [facility] hospitalist to continue to follow patient while in house  A total of  85 minutes taken with patient and coordinating care.  Greater than 50% face to face encounter.      Vicky Duran [facility] Hospitalist               [1]   Allergies  Allergen Reactions    Hydrochlorothiazide RASH and HIVES

## 2025-03-30 NOTE — PROGRESS NOTES
NURSING DISCHARGE NOTE    Discharged Home via Wheelchair.  Accompanied by Support staff  Belongings Taken by patient/family.    All upcoming appointments reviewed with and understood by patient. All medications reviewed with and understood by patient. Explained to pt that he still needs to go to outpatient dialysis tomorrow, per Dr. Bond, then he will be back on his normal M/W/F schedule. All questions answered at this time. Patient left unit with no signs of distress.

## 2025-04-04 ENCOUNTER — APPOINTMENT (OUTPATIENT)
Dept: GENERAL RADIOLOGY | Age: 45
End: 2025-04-04
Attending: EMERGENCY MEDICINE
Payer: MEDICARE

## 2025-04-04 ENCOUNTER — HOSPITAL ENCOUNTER (EMERGENCY)
Age: 45
Discharge: HOME | End: 2025-04-04
Attending: EMERGENCY MEDICINE
Payer: MEDICARE

## 2025-04-04 ENCOUNTER — APPOINTMENT (OUTPATIENT)
Dept: ULTRASOUND IMAGING | Age: 45
End: 2025-04-04
Attending: EMERGENCY MEDICINE
Payer: MEDICARE

## 2025-04-04 VITALS
OXYGEN SATURATION: 97 % | SYSTOLIC BLOOD PRESSURE: 150 MMHG | BODY MASS INDEX: 31.72 KG/M2 | DIASTOLIC BLOOD PRESSURE: 108 MMHG | RESPIRATION RATE: 20 BRPM | HEART RATE: 78 BPM | WEIGHT: 247.13 LBS | HEIGHT: 74 IN | TEMPERATURE: 98 F

## 2025-04-04 DIAGNOSIS — J90 PLEURAL EFFUSION: ICD-10-CM

## 2025-04-04 DIAGNOSIS — T82.590A DIALYSIS AV FISTULA MALFUNCTION, INITIAL ENCOUNTER: ICD-10-CM

## 2025-04-04 DIAGNOSIS — Z99.2 STAGE 5 CHRONIC KIDNEY DISEASE ON CHRONIC DIALYSIS (HCC): Primary | ICD-10-CM

## 2025-04-04 DIAGNOSIS — N18.6 STAGE 5 CHRONIC KIDNEY DISEASE ON CHRONIC DIALYSIS (HCC): Primary | ICD-10-CM

## 2025-04-04 LAB
ANION GAP SERPL CALC-SCNC: 10 MMOL/L (ref 0–18)
BUN BLD-MCNC: 35 MG/DL (ref 9–23)
CALCIUM BLD-MCNC: 8 MG/DL (ref 8.7–10.6)
CHLORIDE SERPL-SCNC: 103 MMOL/L (ref 98–112)
CO2 SERPL-SCNC: 28 MMOL/L (ref 21–32)
CREAT BLD-MCNC: 6.99 MG/DL
EGFRCR SERPLBLD CKD-EPI 2021: 9 ML/MIN/1.73M2 (ref 60–?)
GLUCOSE BLD-MCNC: 74 MG/DL (ref 70–99)
OSMOLALITY SERPL CALC.SUM OF ELEC: 299 MOSM/KG (ref 275–295)
POTASSIUM SERPL-SCNC: 4.2 MMOL/L (ref 3.5–5.1)
SODIUM SERPL-SCNC: 141 MMOL/L (ref 136–145)

## 2025-04-04 PROCEDURE — 93971 EXTREMITY STUDY: CPT | Performed by: EMERGENCY MEDICINE

## 2025-04-04 PROCEDURE — 80048 BASIC METABOLIC PNL TOTAL CA: CPT | Performed by: EMERGENCY MEDICINE

## 2025-04-04 PROCEDURE — 99285 EMERGENCY DEPT VISIT HI MDM: CPT

## 2025-04-04 PROCEDURE — 71046 X-RAY EXAM CHEST 2 VIEWS: CPT | Performed by: EMERGENCY MEDICINE

## 2025-04-04 PROCEDURE — 36415 COLL VENOUS BLD VENIPUNCTURE: CPT

## 2025-04-04 NOTE — ED PROVIDER NOTES
Patient Seen in: [facility] In [place]      History     Chief Complaint   Patient presents with    Lump Mass     Stated Complaint: lump to left fistula sent by [facility] for eval    Subjective:   HPI      Patient comes to the emergency department after noting a lump in his fistula.  The patient noted this at his hemodialysis appointment and the hemodialysis staff instructed the patient to come to the emergency department for further evaluation.  Additionally, the patient is noted to have some slight oozing from the puncture site from his dialysis today.  He was able to complete his dialysis today.  He has no other acute complaints.    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[facility])    Congestive heart disease ([facility])    COPD (chronic obstructive pulmonary disease) ([facility])    Coronary atherosclerosis    Deep vein thrombosis ([facility])    at age 17 R/T cast    Depression    Diabetes ([facility])    Dialysis patient    Diverticulosis of large intestine    Essential hypertension    3/21 echo: severe concentric LVH with normal EF and no MR or pHTN    Extrinsic asthma, unspecified    Heart attack ([facility])    2016- angiogram- no intervention    Heart valve disease    mitral valve repair in 1994/    High blood pressure    High cholesterol    History of blood transfusion    History of mitral valve repair    Hyperlipidemia    Low back pain    tight and stiff after sweeping and mopping    LVH (left ventricular hypertrophy)    Migraines    Mixed hyperlipidemia     HDL 38 LDL 97 VLDL 57     Monoclonal gammopathy    IgG kappa     Muscle weakness    MVP (mitral valve prolapse)    Repair 1994 at [place]; echoes as recently as 3/21 show mild or trivial MR and no stenosis     Neuropathy    Osteoarthritis    hip ,knees    Pneumonia due to organism    Pulmonary embolism (HCC)    Renal disorder    Stroke (HCC)    TIA (transient ischemic attack)    Initial history of left-sided weakness and slurred speech. (+) cocaine. MRI of the brain, CT angiogram of the head and neck, and 2D echo are all unremarkable.     TMJ (dislocation of temporomandibular joint)    Troponin level elevated    Trop 60 60 47 with TIA and no CP: Lexiscan negative with EF 51    Visual impairment              Past Surgical History:   Procedure Laterality Date    Av fistula revision, open Left     Cabg      Colonoscopy N/A 03/26/2023    Procedure: COLONOSCOPY;  Surgeon: Heath Vu MD;  Location:  ENDOSCOPY    Colonoscopy N/A 12/30/2023    Procedure: COLONOSCOPY with cold snare polypectomy and forcep polypectomy;  Surgeon: Ousmane Suarez MD;  Location:  ENDOSCOPY    Colonoscopy N/A 3/9/2025    Procedure: COLONOSCOPY WITH COLD SNARE POLYPECTOMY AND ENDO CLIPS X 2;  Surgeon: Bakari Noguera MD;  Location:  ENDOSCOPY    Colonoscopy & polypectomy  2019    [facility]  2019    Duodenitis. Biopsied. EUS for weight loss was negative    Heart surgery      Hernia surgery  08/17/2022    Dr Barnes    Laminectomy,>2 sgmt,lumbar  09/06/2018    L4-L5 Decomp Discectomy [facility] L4-L5    Mitralplasty w cp bypass  1994    [facility]: Repair    Repair rotator cuff,chronic Left     torn and had a ruptured bicep    Sinus surgery        Spine surgery procedure unlisted      Valve repair  1994    mitral valve                Social History     Socioeconomic History    Marital status:    Tobacco Use    Smoking status: Former     Current packs/day: 0.00     Average packs/day: 1 pack/day for 27.0 years (27.0 ttl pk-yrs)     Types: Cigarettes     Start date: 2/28/1995     Quit date: 2/28/2022     Years since quitting: 3.0     Passive exposure: Never    Smokeless tobacco: Never   Vaping Use    Vaping status: Never Used   Substance and Sexual  Activity    Alcohol use: No    Drug use: No     Social Drivers of Health     Food Insecurity: No Food Insecurity (3/29/2025)    NCSS - Food Insecurity     Worried About Running Out of Food in the Last Year: No     Ran Out of Food in the Last Year: No   Transportation Needs: No Transportation Needs (3/29/2025)    NCSS - Transportation     Lack of Transportation: No   Housing Stability: Not At Risk (3/29/2025)    NCSS - Housing/Utilities     Has Housing: Yes     Worried About Losing Housing: No     Unable to Get Utilities: No                  Physical Exam     ED Triage Vitals [04/04/25 1532]   BP (!) 147/93   Pulse 91   Resp 20   Temp 98 °F (36.7 °C)   Temp src Oral   SpO2 94 %   O2 Device None (Room air)       Current Vitals:   Vital Signs  BP: (!) 154/109  Pulse: 89  Resp: 18  Temp: 98 °F (36.7 °C)  Temp src: Oral    Oxygen Therapy  SpO2: 98 %  O2 Device: None (Room air)        Physical Exam  Vitals and nursing note reviewed.   Constitutional:       General: He is not in acute distress.     Appearance: He is well-developed. He is not ill-appearing.   Cardiovascular:      Comments: Left forearm has a slight ooze from a single puncture site.  Patient indicates an area of swelling and induration just distal to this.  However, palpable thrill is noted.  No generalized edema or cutaneous discoloration noted.  Distal CMS exam is otherwise intact.  Pulmonary:      Effort: Pulmonary effort is normal. No respiratory distress.      Breath sounds: Normal breath sounds. No wheezing.   Abdominal:      General: Bowel sounds are normal. There is no distension.      Palpations: Abdomen is soft.      Tenderness: There is no abdominal tenderness. There is no guarding.   Neurological:      Mental Status: He is alert and oriented to person, place, and time.            ED Course     Labs Reviewed   BASIC METABOLIC PANEL (8) - Abnormal; Notable for the following components:       Result Value    BUN 35 (*)     Creatinine 6.99 (*)      Calcium, Total 8.0 (*)     Calculated Osmolality 299 (*)     eGFR-Cr 9 (*)     All other components within normal limits       ED Course as of 04/04/25 2037  ------------------------------------------------------------  Time: 04/04 1745  Value: Basic Metabolic Panel (8)(!)  Comment: No acute abnormality.  Consistent with patient's history of end-stage renal failure.  ------------------------------------------------------------  Time: 04/04 1746  Value: XR CHEST PA + LAT CHEST (TGN=84478)  Comment: Images were independently viewed by me and increasing right lower lobe pleural effusion noted.  Patient currently has no respiratory complaints  ------------------------------------------------------------  Time: 04/04 1908  Value: US VENOUS DOPPLER ARM LEFT - DIAG IMG (CPT=93971)  Comment: Patent fistula  ------------------------------------------------------------  Time: 04/04 2006  Comment: Spoke with the physician covering for the patient's primary physician regarding the pulmonary findings on chest x-ray.  They are quite familiar with the patient and state that patient has had these findings in the past.    With regard to the patient's fistula, it has stopped bleeding.              MDM      Patient comes to the emergency department after undergoing hemodialysis.  He was concerned about a \"lump\" in his AV fistula and also some mild persistent bleeding from his AV fistula puncture site.  AV fistula clot was evaluated via ultrasound and there was no evidence of clot or obstruction.  Patient's workup revealed right lower lobe pleural effusion.  I reviewed this with the on-call primary physician who was familiar with the patient and stated that this has been noted in the past.  Patient was discharged home with instructions to follow-up closely with his primary physician and return for any acute change or worsening of symptoms.        Medical Decision Making      Disposition and Plan     Clinical Impression:  1. Stage 5  chronic kidney disease on chronic dialysis (HCC)    2. Dialysis AV fistula malfunction, initial encounter    3. Pleural effusion         Disposition:  Discharge  4/4/2025  8:04 pm    Follow-up:  Adrian Arce MD  [address] [facility] [address] [phone]    Call            Medications Prescribed:  Current Discharge Medication List              Supplementary Documentation:

## 2025-04-04 NOTE — ED INITIAL ASSESSMENT (HPI)
To ER for eval of bleeding to the left forearm fistula while at dialysis today. He states on Monday,they staff had a problem accessing it and his dialysis was cut short. He did not receive dialysis today or Wedn. He is also here for chronic rectal bleeding and shortness of breath. + bruitts noted to the site. No bleeding is noted at this time.

## 2025-04-05 ENCOUNTER — HOSPITAL ENCOUNTER (EMERGENCY)
Age: 45
Discharge: HOME | End: 2025-04-05
Attending: EMERGENCY MEDICINE
Payer: MEDICARE

## 2025-04-05 VITALS
HEART RATE: 81 BPM | OXYGEN SATURATION: 100 % | WEIGHT: 240 LBS | TEMPERATURE: 99 F | RESPIRATION RATE: 16 BRPM | HEIGHT: 74 IN | BODY MASS INDEX: 30.8 KG/M2 | SYSTOLIC BLOOD PRESSURE: 153 MMHG | DIASTOLIC BLOOD PRESSURE: 108 MMHG

## 2025-04-05 DIAGNOSIS — K62.5 RECTAL BLEEDING: ICD-10-CM

## 2025-04-05 DIAGNOSIS — G89.29 CHRONIC ABDOMINAL PAIN: Primary | ICD-10-CM

## 2025-04-05 DIAGNOSIS — R10.9 CHRONIC ABDOMINAL PAIN: Primary | ICD-10-CM

## 2025-04-05 LAB
ALBUMIN SERPL-MCNC: 3.9 G/DL (ref 3.2–4.8)
ALBUMIN/GLOB SERPL: 1.2 {RATIO} (ref 1–2)
ALP LIVER SERPL-CCNC: 96 U/L
ALT SERPL-CCNC: 25 U/L
ANION GAP SERPL CALC-SCNC: 11 MMOL/L (ref 0–18)
AST SERPL-CCNC: 33 U/L (ref ?–34)
BASOPHILS # BLD AUTO: 0.08 X10(3) UL (ref 0–0.2)
BASOPHILS NFR BLD AUTO: 1.6 %
BILIRUB SERPL-MCNC: 0.5 MG/DL (ref 0.3–1.2)
BUN BLD-MCNC: 45 MG/DL (ref 9–23)
CALCIUM BLD-MCNC: 7.9 MG/DL (ref 8.7–10.6)
CHLORIDE SERPL-SCNC: 104 MMOL/L (ref 98–112)
CO2 SERPL-SCNC: 26 MMOL/L (ref 21–32)
CREAT BLD-MCNC: 8.55 MG/DL
EGFRCR SERPLBLD CKD-EPI 2021: 7 ML/MIN/1.73M2 (ref 60–?)
EOSINOPHIL # BLD AUTO: 0.11 X10(3) UL (ref 0–0.7)
EOSINOPHIL NFR BLD AUTO: 2.2 %
ERYTHROCYTE [DISTWIDTH] IN BLOOD BY AUTOMATED COUNT: 14.6 %
GLOBULIN PLAS-MCNC: 3.2 G/DL (ref 2–3.5)
GLUCOSE BLD-MCNC: 239 MG/DL (ref 70–99)
HCT VFR BLD AUTO: 23 %
HGB BLD-MCNC: 7.5 G/DL
IMM GRANULOCYTES # BLD AUTO: 0.01 X10(3) UL (ref 0–1)
IMM GRANULOCYTES NFR BLD: 0.2 %
LIPASE SERPL-CCNC: 43 U/L (ref 12–53)
LYMPHOCYTES # BLD AUTO: 0.77 X10(3) UL (ref 1–4)
LYMPHOCYTES NFR BLD AUTO: 15.7 %
MCH RBC QN AUTO: 32.3 PG (ref 26–34)
MCHC RBC AUTO-ENTMCNC: 32.6 G/DL (ref 31–37)
MCV RBC AUTO: 99.1 FL
MONOCYTES # BLD AUTO: 0.53 X10(3) UL (ref 0.1–1)
MONOCYTES NFR BLD AUTO: 10.8 %
NEUTROPHILS # BLD AUTO: 3.41 X10 (3) UL (ref 1.5–7.7)
NEUTROPHILS # BLD AUTO: 3.41 X10(3) UL (ref 1.5–7.7)
NEUTROPHILS NFR BLD AUTO: 69.5 %
OSMOLALITY SERPL CALC.SUM OF ELEC: 311 MOSM/KG (ref 275–295)
PLATELET # BLD AUTO: 242 10(3)UL (ref 150–450)
POTASSIUM SERPL-SCNC: 3.9 MMOL/L (ref 3.5–5.1)
PROT SERPL-MCNC: 7.1 G/DL (ref 5.7–8.2)
RBC # BLD AUTO: 2.32 X10(6)UL
SODIUM SERPL-SCNC: 141 MMOL/L (ref 136–145)
WBC # BLD AUTO: 4.9 X10(3) UL (ref 4–11)

## 2025-04-05 PROCEDURE — 99284 EMERGENCY DEPT VISIT MOD MDM: CPT

## 2025-04-05 PROCEDURE — 85025 COMPLETE CBC W/AUTO DIFF WBC: CPT | Performed by: EMERGENCY MEDICINE

## 2025-04-05 PROCEDURE — 83690 ASSAY OF LIPASE: CPT | Performed by: EMERGENCY MEDICINE

## 2025-04-05 PROCEDURE — 96374 THER/PROPH/DIAG INJ IV PUSH: CPT

## 2025-04-05 PROCEDURE — 80053 COMPREHEN METABOLIC PANEL: CPT | Performed by: EMERGENCY MEDICINE

## 2025-04-05 RX ORDER — HYDROMORPHONE HYDROCHLORIDE 1 MG/ML
1 INJECTION, SOLUTION INTRAMUSCULAR; INTRAVENOUS; SUBCUTANEOUS ONCE
Status: COMPLETED | OUTPATIENT
Start: 2025-04-05 | End: 2025-04-05

## 2025-04-05 RX ORDER — HYDROCORTISONE ACETATE 25 MG/1
25 SUPPOSITORY RECTAL NIGHTLY
Qty: 7 SUPPOSITORY | Refills: 0 | Status: SHIPPED | OUTPATIENT
Start: 2025-04-05 | End: 2025-04-12

## 2025-04-05 NOTE — DISCHARGE INSTRUCTIONS
Use the Anusol suppositories as prescribed follow-up with your primary care doctor over the next few days return if worse

## 2025-04-05 NOTE — ED QUICK NOTES
DC instr were given to cont with his meds and dialysis routine. To keep his appointment as scheduled(to try to be seen sooner). To return to the nearest ER if any problems occur. He expressed an understanding and was dc'd home without much change in his status. His BP remains elevated and he states this is his normal level when he has not been dialyzed.

## 2025-04-05 NOTE — ED PROVIDER NOTES
Patient Seen in: [facility] In [place]      History     Chief Complaint   Patient presents with    GI Bleeding    Abdomen/Flank Pain    Difficulty Breathing     Stated Complaint: rectal bleeding, short of breath    Subjective:   HPI      44-year-old male complaining of abdominal pain patient has a history of end-stage renal disease last dialysis was yesterday he actually was seen last night because of the lump of his fistula and had an ultrasound which was unremarkable.  The patient comes back after now this morning he said he had about 4 episodes of slightly bloody loose stool.  He has had this intermittently in the past he was diagnosed with internal hemorrhoids and reportedly is scheduled for surgery.  He is complaining of crampy left lower quadrant abdominal pain no fever chills or vomiting.  He has been seen for this several times he has had 4 CT scans of the abdomen over this past month.  He had a colonoscopy by gastroenterology about a month ago as well he had a couple of polyps and internal hemorrhoids.    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[facility])    Congestive heart disease (HCC)    COPD (chronic obstructive pulmonary disease) ([facility])    Coronary atherosclerosis    Deep vein thrombosis ([facility])    at age 17 R/T cast    Depression    Diabetes ([facility])    Dialysis patient    Diverticulosis of large intestine    Essential hypertension    3/21 echo: severe concentric LVH with normal EF and no MR or pHTN    Extrinsic asthma, unspecified    Heart attack (HCC)    2016- angiogram- no intervention    Heart valve disease    mitral valve repair in 1994/    High blood pressure    High cholesterol    History of blood transfusion    History of mitral valve  repair    Hyperlipidemia    Low back pain    tight and stiff after sweeping and mopping    LVH (left ventricular hypertrophy)    Migraines    Mixed hyperlipidemia     HDL 38 LDL 97 VLDL 57     Monoclonal gammopathy    IgG kappa     Muscle weakness    MVP (mitral valve prolapse)    Repair 1994 at [place]; echoes as recently as 3/21 show mild or trivial MR and no stenosis    Neuropathy    Osteoarthritis    hip ,knees    Pneumonia due to organism    Pulmonary embolism (HCC)    Renal disorder    Stroke (HCC)    TIA (transient ischemic attack)    Initial history of left-sided weakness and slurred speech. (+) cocaine. MRI of the brain, CT angiogram of the head and neck, and 2D echo are all unremarkable.     TMJ (dislocation of temporomandibular joint)    Troponin level elevated    Trop 60 60 47 with TIA and no CP: Lexiscan negative with EF 51    Visual impairment              Past Surgical History:   Procedure Laterality Date    Av fistula revision, open Left     Cabg      Colonoscopy N/A 03/26/2023    Procedure: COLONOSCOPY;  Surgeon: Heath Vu MD;  Location:  ENDOSCOPY    Colonoscopy N/A 12/30/2023    Procedure: COLONOSCOPY with cold snare polypectomy and forcep polypectomy;  Surgeon: Ousmane Suarez MD;  Location:  ENDOSCOPY    Colonoscopy N/A 3/9/2025    Procedure: COLONOSCOPY WITH COLD SNARE POLYPECTOMY AND ENDO CLIPS X 2;  Surgeon: Bakari Noguera MD;  Location:  ENDOSCOPY    Colonoscopy & polypectomy  2019    Egd  2019    Duodenitis. Biopsied. EUS for weight loss was negative    Heart surgery      Hernia surgery  08/17/2022    Dr Barnes    Laminectomy,>2 sgmt,lumbar  09/06/2018    L4-L5 Decomp Discectomy ROEM L4-L5    Mitralplasty w cp bypass  1994    [place]: Repair    Repair rotator cuff,chronic Left     torn and had a ruptured bicep    Sinus surgery        Spine surgery procedure unlisted      Valve repair  1994    mitral valve                Social History     Socioeconomic History     Marital status:    Tobacco Use    Smoking status: Former     Current packs/day: 0.00     Average packs/day: 1 pack/day for 27.0 years (27.0 ttl pk-yrs)     Types: Cigarettes     Start date: 2/28/1995     Quit date: 2/28/2022     Years since quitting: 3.1     Passive exposure: Never    Smokeless tobacco: Never   Vaping Use    Vaping status: Never Used   Substance and Sexual Activity    Alcohol use: No    Drug use: No     Social Drivers of Health     Food Insecurity: No Food Insecurity (3/29/2025)    NCSS - Food Insecurity     Worried About Running Out of Food in the Last Year: No     Ran Out of Food in the Last Year: No   Transportation Needs: No Transportation Needs (3/29/2025)    NCSS - Transportation     Lack of Transportation: No   Housing Stability: Not At Risk (3/29/2025)    NCSS - Housing/Utilities     Has Housing: Yes     Worried About Losing Housing: No     Unable to Get Utilities: No                  Physical Exam     ED Triage Vitals [04/05/25 1044]   BP (!) 155/95   Pulse 92   Resp (!) 28   Temp 99 °F (37.2 °C)   Temp src Temporal   SpO2 96 %   O2 Device None (Room air)       Current Vitals:   Vital Signs  BP: (!) 163/104  Pulse: 88  Resp: 16  Temp: 99 °F (37.2 °C)  Temp src: Temporal    Oxygen Therapy  SpO2: 98 %  O2 Device: None (Room air)        Physical Exam  Patient is alert orient x 3 appears mildly uncomfortable lungs are clear cardiovascular exam shows regular rate and rhythm without murmurs abdomen soft there is some mild left lower quadrant tenderness no masses guarding rebound rectal exam was done with nurse as chaperone there is no external hemorrhoids noted no palpable masses there is small amount of bright red blood on digital exam.    ED Course     Labs Reviewed   COMP METABOLIC PANEL (14) - Abnormal; Notable for the following components:       Result Value    Glucose 239 (*)     BUN 45 (*)     Creatinine 8.55 (*)     Calcium, Total 7.9 (*)     Calculated Osmolality 311 (*)      eGFR-Cr 7 (*)     All other components within normal limits   CBC WITH DIFFERENTIAL WITH PLATELET - Abnormal; Notable for the following components:    RBC 2.32 (*)     HGB 7.5 (*)     HCT 23.0 (*)     Lymphocyte Absolute 0.77 (*)     All other components within normal limits   LIPASE - Normal   RAINBOW DRAW BLUE     Patient's hemoglobin 7.5 which is stable the BUN 45 creatinine 8.55 which is to be expected with end-stage renal disease.          MDM      Initial differential diagnosis includes colitis internal hemorrhoids diverticular bleeding        Medical Decision Making  Patient presents with intermittent chronic abdominal pain which is here for frequently and rectal bleeding has been seen for this several times in the past he seems hemodynamically stable he was given Dilaudid for pain appears comfortable prior to discharge advised him to contact primary care doctor and/or gastroenterology to see if he could be scheduled for the hemorrhoidectomy sooner.    Disposition and Plan     Clinical Impression:  1. Chronic abdominal pain    2. Rectal bleeding         Disposition:  Discharge  4/5/2025 12:22 pm    Follow-up:  Adrian Arce MD  [address] [facility] [address]  [phone]    Follow up in 2 day(s)            Medications Prescribed:  Current Discharge Medication List        START taking these medications    Details   hydrocortisone 25 MG Rectal Suppos Place 1 suppository (25 mg total) rectally at bedtime for 7 days.  Qty: 7 suppository, Refills: 0                 Supplementary Documentation:

## 2025-04-19 ENCOUNTER — HOSPITAL ENCOUNTER (OUTPATIENT)
Facility: HOSPITAL | Age: 45
Setting detail: OBSERVATION
LOS: 1 days | Discharge: HOME | End: 2025-04-24
Attending: EMERGENCY MEDICINE | Admitting: INTERNAL MEDICINE
Payer: MEDICARE

## 2025-04-19 ENCOUNTER — APPOINTMENT (OUTPATIENT)
Dept: CT IMAGING | Age: 45
End: 2025-04-19
Attending: EMERGENCY MEDICINE
Payer: MEDICARE

## 2025-04-19 ENCOUNTER — APPOINTMENT (OUTPATIENT)
Dept: GENERAL RADIOLOGY | Age: 45
End: 2025-04-19
Attending: EMERGENCY MEDICINE
Payer: MEDICARE

## 2025-04-19 DIAGNOSIS — R06.09 DOE (DYSPNEA ON EXERTION): Primary | ICD-10-CM

## 2025-04-19 LAB
ALBUMIN SERPL-MCNC: 4.1 G/DL (ref 3.2–4.8)
ALBUMIN/GLOB SERPL: 1.2 {RATIO} (ref 1–2)
ALP LIVER SERPL-CCNC: 112 U/L (ref 45–117)
ALT SERPL-CCNC: 84 U/L (ref 10–49)
ANION GAP SERPL CALC-SCNC: 12 MMOL/L (ref 0–18)
AST SERPL-CCNC: 53 U/L (ref ?–34)
BASOPHILS # BLD AUTO: 0.09 X10(3) UL (ref 0–0.2)
BASOPHILS NFR BLD AUTO: 1.5 %
BILIRUB SERPL-MCNC: 0.4 MG/DL (ref 0.3–1.2)
BUN BLD-MCNC: 53 MG/DL (ref 9–23)
CALCIUM BLD-MCNC: 8.9 MG/DL (ref 8.7–10.6)
CHLORIDE SERPL-SCNC: 100 MMOL/L (ref 98–112)
CO2 SERPL-SCNC: 25 MMOL/L (ref 21–32)
CREAT BLD-MCNC: 8.12 MG/DL (ref 0.7–1.3)
EGFRCR SERPLBLD CKD-EPI 2021: 8 ML/MIN/1.73M2 (ref 60–?)
EOSINOPHIL # BLD AUTO: 0.19 X10(3) UL (ref 0–0.7)
EOSINOPHIL NFR BLD AUTO: 3.1 %
ERYTHROCYTE [DISTWIDTH] IN BLOOD BY AUTOMATED COUNT: 13.7 %
GLOBULIN PLAS-MCNC: 3.3 G/DL (ref 2–3.5)
GLUCOSE BLD-MCNC: 229 MG/DL (ref 70–99)
HCT VFR BLD AUTO: 24.7 % (ref 39–53)
HGB BLD-MCNC: 8.4 G/DL (ref 13–17.5)
IMM GRANULOCYTES # BLD AUTO: 0.01 X10(3) UL (ref 0–1)
IMM GRANULOCYTES NFR BLD: 0.2 %
LYMPHOCYTES # BLD AUTO: 1.08 X10(3) UL (ref 1–4)
LYMPHOCYTES NFR BLD AUTO: 17.9 %
MCH RBC QN AUTO: 32.2 PG (ref 26–34)
MCHC RBC AUTO-ENTMCNC: 34 G/DL (ref 31–37)
MCV RBC AUTO: 94.6 FL (ref 80–100)
MONOCYTES # BLD AUTO: 0.63 X10(3) UL (ref 0.1–1)
MONOCYTES NFR BLD AUTO: 10.4 %
NEUTROPHILS # BLD AUTO: 4.04 X10 (3) UL (ref 1.5–7.7)
NEUTROPHILS # BLD AUTO: 4.04 X10(3) UL (ref 1.5–7.7)
NEUTROPHILS NFR BLD AUTO: 66.9 %
OSMOLALITY SERPL CALC.SUM OF ELEC: 306 MOSM/KG (ref 275–295)
PLATELET # BLD AUTO: 199 10(3)UL (ref 150–450)
POCT INFLUENZA A: NEGATIVE
POCT INFLUENZA B: NEGATIVE
POTASSIUM SERPL-SCNC: 3.6 MMOL/L (ref 3.5–5.1)
PROT SERPL-MCNC: 7.4 G/DL (ref 5.7–8.2)
RBC # BLD AUTO: 2.61 X10(6)UL (ref 4.3–5.7)
SARS-COV-2 RNA RESP QL NAA+PROBE: NOT DETECTED
SODIUM SERPL-SCNC: 137 MMOL/L (ref 136–145)
TROPONIN I SERPL HS-MCNC: 271 NG/L (ref ?–53)
WBC # BLD AUTO: 6 X10(3) UL (ref 4–11)

## 2025-04-19 PROCEDURE — 83880 ASSAY OF NATRIURETIC PEPTIDE: CPT | Performed by: EMERGENCY MEDICINE

## 2025-04-19 PROCEDURE — 99285 EMERGENCY DEPT VISIT HI MDM: CPT

## 2025-04-19 PROCEDURE — 80053 COMPREHEN METABOLIC PANEL: CPT | Performed by: EMERGENCY MEDICINE

## 2025-04-19 PROCEDURE — 74176 CT ABD & PELVIS W/O CONTRAST: CPT | Performed by: EMERGENCY MEDICINE

## 2025-04-19 PROCEDURE — 96375 TX/PRO/DX INJ NEW DRUG ADDON: CPT

## 2025-04-19 PROCEDURE — 84484 ASSAY OF TROPONIN QUANT: CPT | Performed by: EMERGENCY MEDICINE

## 2025-04-19 PROCEDURE — 80061 LIPID PANEL: CPT | Performed by: EMERGENCY MEDICINE

## 2025-04-19 PROCEDURE — 93005 ELECTROCARDIOGRAM TRACING: CPT

## 2025-04-19 PROCEDURE — 93010 ELECTROCARDIOGRAM REPORT: CPT

## 2025-04-19 PROCEDURE — 71045 X-RAY EXAM CHEST 1 VIEW: CPT | Performed by: EMERGENCY MEDICINE

## 2025-04-19 PROCEDURE — 87502 INFLUENZA DNA AMP PROBE: CPT | Performed by: EMERGENCY MEDICINE

## 2025-04-19 PROCEDURE — 71250 CT THORAX DX C-: CPT | Performed by: EMERGENCY MEDICINE

## 2025-04-19 PROCEDURE — 96374 THER/PROPH/DIAG INJ IV PUSH: CPT

## 2025-04-19 PROCEDURE — 85025 COMPLETE CBC W/AUTO DIFF WBC: CPT | Performed by: EMERGENCY MEDICINE

## 2025-04-19 RX ORDER — MORPHINE SULFATE 4 MG/ML
4 INJECTION, SOLUTION INTRAMUSCULAR; INTRAVENOUS ONCE
Status: COMPLETED | OUTPATIENT
Start: 2025-04-19 | End: 2025-04-19

## 2025-04-19 RX ORDER — CYCLOBENZAPRINE HCL 5 MG
1 TABLET ORAL 3 TIMES DAILY PRN
Status: ON HOLD | COMMUNITY
Start: 2024-08-28

## 2025-04-19 RX ORDER — LABETALOL HYDROCHLORIDE 5 MG/ML
20 INJECTION, SOLUTION INTRAVENOUS ONCE
Status: DISCONTINUED | OUTPATIENT
Start: 2025-04-19 | End: 2025-04-20

## 2025-04-20 PROBLEM — R06.09 DOE (DYSPNEA ON EXERTION): Status: ACTIVE | Noted: 2025-04-20

## 2025-04-20 LAB
ALBUMIN SERPL-MCNC: 4.1 G/DL (ref 3.2–4.8)
ANION GAP SERPL CALC-SCNC: 15 MMOL/L (ref 0–18)
ATRIAL RATE: 94 BPM
BUN BLD-MCNC: 53 MG/DL (ref 9–23)
CALCIUM BLD-MCNC: 9.4 MG/DL (ref 8.7–10.6)
CHLORIDE SERPL-SCNC: 100 MMOL/L (ref 98–112)
CHOLEST SERPL-MCNC: 155 MG/DL (ref ?–200)
CO2 SERPL-SCNC: 26 MMOL/L (ref 21–32)
CREAT BLD-MCNC: 8.84 MG/DL (ref 0.7–1.3)
EGFRCR SERPLBLD CKD-EPI 2021: 7 ML/MIN/1.73M2 (ref 60–?)
GLUCOSE BLD-MCNC: 101 MG/DL (ref 70–99)
HDLC SERPL-MCNC: 50 MG/DL (ref 40–59)
LDLC SERPL CALC-MCNC: 83 MG/DL (ref ?–100)
NONHDLC SERPL-MCNC: 105 MG/DL (ref ?–130)
NT-PROBNP SERPL-MCNC: 8688 PG/ML (ref ?–125)
OSMOLALITY SERPL CALC.SUM OF ELEC: 307 MOSM/KG (ref 275–295)
P AXIS: 39 DEGREES
P-R INTERVAL: 208 MS
PHOSPHATE SERPL-MCNC: 6.2 MG/DL (ref 2.4–5.1)
POTASSIUM SERPL-SCNC: 3.7 MMOL/L (ref 3.5–5.1)
Q-T INTERVAL: 386 MS
QRS DURATION: 96 MS
QTC CALCULATION (BEZET): 482 MS
R AXIS: 22 DEGREES
SODIUM SERPL-SCNC: 141 MMOL/L (ref 136–145)
T AXIS: 55 DEGREES
TRIGL SERPL-MCNC: 126 MG/DL (ref 30–149)
VENTRICULAR RATE: 94 BPM
VLDLC SERPL CALC-MCNC: 20 MG/DL (ref 0–30)

## 2025-04-20 PROCEDURE — 94640 AIRWAY INHALATION TREATMENT: CPT

## 2025-04-20 PROCEDURE — 96375 TX/PRO/DX INJ NEW DRUG ADDON: CPT

## 2025-04-20 PROCEDURE — 96372 THER/PROPH/DIAG INJ SC/IM: CPT

## 2025-04-20 PROCEDURE — 80069 RENAL FUNCTION PANEL: CPT | Performed by: HOSPITALIST

## 2025-04-20 PROCEDURE — 96376 TX/PRO/DX INJ SAME DRUG ADON: CPT

## 2025-04-20 RX ORDER — LAMOTRIGINE 100 MG/1
100 TABLET ORAL DAILY
Status: DISCONTINUED | OUTPATIENT
Start: 2025-04-20 | End: 2025-04-24

## 2025-04-20 RX ORDER — ARIPIPRAZOLE 5 MG/1
10 TABLET ORAL DAILY
Status: DISCONTINUED | OUTPATIENT
Start: 2025-04-20 | End: 2025-04-24

## 2025-04-20 RX ORDER — LOSARTAN POTASSIUM 100 MG/1
100 TABLET ORAL DAILY
Status: DISCONTINUED | OUTPATIENT
Start: 2025-04-20 | End: 2025-04-24

## 2025-04-20 RX ORDER — ONDANSETRON 2 MG/ML
4 INJECTION INTRAMUSCULAR; INTRAVENOUS EVERY 6 HOURS PRN
Status: DISCONTINUED | OUTPATIENT
Start: 2025-04-20 | End: 2025-04-24

## 2025-04-20 RX ORDER — POLYETHYLENE GLYCOL 3350 17 G/17G
17 POWDER, FOR SOLUTION ORAL DAILY PRN
Status: DISCONTINUED | OUTPATIENT
Start: 2025-04-20 | End: 2025-04-24

## 2025-04-20 RX ORDER — HYDRALAZINE HYDROCHLORIDE 20 MG/ML
10 INJECTION INTRAMUSCULAR; INTRAVENOUS EVERY 4 HOURS PRN
Status: DISCONTINUED | OUTPATIENT
Start: 2025-04-20 | End: 2025-04-24

## 2025-04-20 RX ORDER — CLONIDINE HYDROCHLORIDE 0.1 MG/1
0.1 TABLET ORAL 2 TIMES DAILY
Status: DISCONTINUED | OUTPATIENT
Start: 2025-04-20 | End: 2025-04-24

## 2025-04-20 RX ORDER — HYDRALAZINE HYDROCHLORIDE 25 MG/1
25 TABLET, FILM COATED ORAL 3 TIMES DAILY
Status: DISCONTINUED | OUTPATIENT
Start: 2025-04-20 | End: 2025-04-24

## 2025-04-20 RX ORDER — ALBUTEROL SULFATE 90 UG/1
2 INHALANT RESPIRATORY (INHALATION) EVERY 6 HOURS PRN
Status: DISCONTINUED | OUTPATIENT
Start: 2025-04-20 | End: 2025-04-24

## 2025-04-20 RX ORDER — TIOTROPIUM BROMIDE 18 UG/1
18 CAPSULE ORAL; RESPIRATORY (INHALATION) DAILY
Status: ON HOLD | COMMUNITY

## 2025-04-20 RX ORDER — MORPHINE SULFATE 4 MG/ML
4 INJECTION, SOLUTION INTRAMUSCULAR; INTRAVENOUS ONCE
Status: COMPLETED | OUTPATIENT
Start: 2025-04-20 | End: 2025-04-20

## 2025-04-20 RX ORDER — PROCHLORPERAZINE EDISYLATE 5 MG/ML
5 INJECTION INTRAMUSCULAR; INTRAVENOUS EVERY 8 HOURS PRN
Status: DISCONTINUED | OUTPATIENT
Start: 2025-04-20 | End: 2025-04-24

## 2025-04-20 RX ORDER — BUPROPION HYDROCHLORIDE 150 MG/1
150 TABLET ORAL DAILY
Status: DISCONTINUED | OUTPATIENT
Start: 2025-04-20 | End: 2025-04-24

## 2025-04-20 RX ORDER — GABAPENTIN 100 MG/1
200 CAPSULE ORAL 3 TIMES DAILY
Status: DISCONTINUED | OUTPATIENT
Start: 2025-04-20 | End: 2025-04-24

## 2025-04-20 RX ORDER — CALCIUM ACETATE 667 MG/1
667 CAPSULE ORAL
Status: DISCONTINUED | OUTPATIENT
Start: 2025-04-20 | End: 2025-04-24

## 2025-04-20 RX ORDER — CYCLOBENZAPRINE HCL 5 MG
5 TABLET ORAL 3 TIMES DAILY PRN
Status: DISCONTINUED | OUTPATIENT
Start: 2025-04-20 | End: 2025-04-24

## 2025-04-20 RX ORDER — BISACODYL 10 MG
10 SUPPOSITORY, RECTAL RECTAL
Status: DISCONTINUED | OUTPATIENT
Start: 2025-04-20 | End: 2025-04-24

## 2025-04-20 RX ORDER — SEVELAMER CARBONATE 800 MG/1
2400 TABLET, FILM COATED ORAL
Status: DISCONTINUED | OUTPATIENT
Start: 2025-04-20 | End: 2025-04-24

## 2025-04-20 RX ORDER — NIFEDIPINE 30 MG/1
30 TABLET, EXTENDED RELEASE ORAL DAILY
Status: DISCONTINUED | OUTPATIENT
Start: 2025-04-20 | End: 2025-04-24

## 2025-04-20 RX ORDER — ARIPIPRAZOLE 5 MG/1
5 TABLET ORAL DAILY
Status: DISCONTINUED | OUTPATIENT
Start: 2025-04-20 | End: 2025-04-24

## 2025-04-20 RX ORDER — SENNOSIDES 8.6 MG
17.2 TABLET ORAL NIGHTLY PRN
Status: DISCONTINUED | OUTPATIENT
Start: 2025-04-20 | End: 2025-04-24

## 2025-04-20 RX ORDER — TAMSULOSIN HYDROCHLORIDE 0.4 MG/1
0.4 CAPSULE ORAL DAILY
Status: DISCONTINUED | OUTPATIENT
Start: 2025-04-20 | End: 2025-04-24

## 2025-04-20 RX ORDER — ACETAMINOPHEN 500 MG
500 TABLET ORAL EVERY 4 HOURS PRN
Status: DISCONTINUED | OUTPATIENT
Start: 2025-04-20 | End: 2025-04-21

## 2025-04-20 RX ORDER — PANTOPRAZOLE SODIUM 40 MG/1
40 TABLET, DELAYED RELEASE ORAL
Status: DISCONTINUED | OUTPATIENT
Start: 2025-04-20 | End: 2025-04-24

## 2025-04-20 RX ORDER — HEPARIN SODIUM 5000 [USP'U]/ML
5000 INJECTION, SOLUTION INTRAVENOUS; SUBCUTANEOUS EVERY 8 HOURS SCHEDULED
Status: DISCONTINUED | OUTPATIENT
Start: 2025-04-20 | End: 2025-04-24

## 2025-04-20 RX ORDER — DEXTROAMPHETAMINE SACCHARATE, AMPHETAMINE ASPARTATE, DEXTROAMPHETAMINE SULFATE AND AMPHETAMINE SULFATE 2.5; 2.5; 2.5; 2.5 MG/1; MG/1; MG/1; MG/1
10 TABLET ORAL
Status: DISCONTINUED | OUTPATIENT
Start: 2025-04-20 | End: 2025-04-24

## 2025-04-20 RX ORDER — HYDROCODONE BITARTRATE AND ACETAMINOPHEN 10; 325 MG/1; MG/1
1 TABLET ORAL EVERY 6 HOURS PRN
Status: DISCONTINUED | OUTPATIENT
Start: 2025-04-20 | End: 2025-04-24

## 2025-04-20 RX ORDER — CARVEDILOL 12.5 MG/1
25 TABLET ORAL 2 TIMES DAILY WITH MEALS
Status: DISCONTINUED | OUTPATIENT
Start: 2025-04-20 | End: 2025-04-24

## 2025-04-20 RX ORDER — ATORVASTATIN CALCIUM 20 MG/1
20 TABLET, FILM COATED ORAL EVERY EVENING
Status: DISCONTINUED | OUTPATIENT
Start: 2025-04-20 | End: 2025-04-24

## 2025-04-20 RX ORDER — FLUTICASONE PROPIONATE 50 MCG
2 SPRAY, SUSPENSION (ML) NASAL DAILY
Status: DISCONTINUED | OUTPATIENT
Start: 2025-04-20 | End: 2025-04-24

## 2025-04-20 NOTE — ED PROVIDER NOTES
Patient Seen in: [facility] In [place]      History     Chief Complaint   Patient presents with    Abdomen/Flank Pain    Difficulty Breathing     Stated Complaint: shortness of breath, abdominal pain, problem with fistula    Subjective:   HPI    Patient is here concerned about persistent shortness of breath that been going on for 2 weeks.  States symptoms have been essentially stable since he left the hospital after admission for shortness of breath.  No fevers, no chills, no cough.  No chest pain.  No lightheadedness.  States he been compliant with his medications.  Has been going to dialysis in fact had a 4-hour session    Were dismount of fluid was taken off.  He was hoping to feel better but has until he wanted to come in.  Additionally is concerned about some swelling around his fistula.    There were no problems with dialysis yesterday otherwise.    Complains of persistent abdominal pain as well.  States he was recently diagnosed with diverticulitis.  Out of pain is on the left side.          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[facility])    Congestive heart disease ([facility])    COPD (chronic obstructive pulmonary disease) ([facility])    Coronary atherosclerosis    Deep vein thrombosis ([facility])    at age 17 R/T cast    Depression    Diabetes ([facility])    Dialysis patient    Diverticulosis of large intestine    Essential hypertension    3/21 echo: severe concentric LVH with normal EF and no MR or pHTN    Extrinsic asthma, unspecified    Heart attack ([facility])    2016- angiogram- no intervention    Heart valve disease    mitral valve repair in 1994/    High blood pressure    High cholesterol    History of blood transfusion     History of mitral valve repair    Hyperlipidemia    Low back pain    tight and stiff after sweeping and mopping    LVH (left ventricular hypertrophy)    Migraines    Mixed hyperlipidemia     HDL 38 LDL 97 VLDL 57     Monoclonal gammopathy    IgG kappa     Muscle weakness    MVP (mitral valve prolapse)    Repair 1994 at [place]; echoes as recently as 3/21 show mild or trivial MR and no stenosis    Neuropathy    Osteoarthritis    hip ,knees    Pneumonia due to organism    Pulmonary embolism (HCC)    Renal disorder    Stroke (HCC)    TIA (transient ischemic attack)    Initial history of left-sided weakness and slurred speech. (+) cocaine. MRI of the brain, CT angiogram of the head and neck, and 2D echo are all unremarkable.     TMJ (dislocation of temporomandibular joint)    Troponin level elevated    Trop 60 60 47 with TIA and no CP: Lexiscan negative with EF 51    Visual impairment              Past Surgical History:   Procedure Laterality Date    Av fistula revision, open Left     Cabg      Colonoscopy N/A 03/26/2023    Procedure: COLONOSCOPY;  Surgeon: Heath Vu MD;  Location:  ENDOSCOPY    Colonoscopy N/A 12/30/2023    Procedure: COLONOSCOPY with cold snare polypectomy and forcep polypectomy;  Surgeon: Ousmane Suarez MD;  Location:  ENDOSCOPY    Colonoscopy N/A 3/9/2025    Procedure: COLONOSCOPY WITH COLD SNARE POLYPECTOMY AND ENDO CLIPS X 2;  Surgeon: Bakari Noguera MD;  Location:  ENDOSCOPY    Colonoscopy & polypectomy  2019    Egd  2019    Duodenitis. Biopsied. EUS for weight loss was negative    Heart surgery      Hernia surgery  08/17/2022    Dr Barnes    Laminectomy,>2 sgmt,lumbar  09/06/2018    L4-L5 Decomp Discectomy ROEM L4-L5    Mitralplasty w cp bypass  1994    [place]: Repair    Repair rotator cuff,chronic Left     torn and had a ruptured bicep    Sinus surgery        Spine surgery procedure unlisted      Valve repair  1994    mitral valve                Social History      Socioeconomic History    Marital status:    Tobacco Use    Smoking status: Former     Current packs/day: 0.00     Average packs/day: 1 pack/day for 27.0 years (27.0 ttl pk-yrs)     Types: Cigarettes     Start date: 2/28/1995     Quit date: 2/28/2022     Years since quitting: 3.1     Passive exposure: Never    Smokeless tobacco: Never   Vaping Use    Vaping status: Never Used   Substance and Sexual Activity    Alcohol use: No    Drug use: No     Social Drivers of Health     Food Insecurity: No Food Insecurity (3/29/2025)    NCSS - Food Insecurity     Worried About Running Out of Food in the Last Year: No     Ran Out of Food in the Last Year: No   Transportation Needs: No Transportation Needs (3/29/2025)    NCSS - Transportation     Lack of Transportation: No   Housing Stability: Not At Risk (3/29/2025)    NCSS - Housing/Utilities     Has Housing: Yes     Worried About Losing Housing: No     Unable to Get Utilities: No                                Physical Exam     ED Triage Vitals [04/19/25 2126]   BP (!) 191/115   Pulse 93   Resp 24   Temp 97.4 °F (36.3 °C)   Temp src Temporal   SpO2 100 %   O2 Device None (Room air)       Current Vitals:   Vital Signs  BP: (!) 158/111  Pulse: 89  Resp: 20  Temp: 97.4 °F (36.3 °C)  Temp src: Temporal    Oxygen Therapy  SpO2: 100 %  O2 Device: None (Room air)        Physical Exam    Constitutional: Awake, alert, age appearing, non-toxic  Head: Normocephalic and atraumatic.     Eyes: EOM are normal. Pupils are equal, round, and reactive to light.   Neck: Normal range of motion. Neck supple. No JVD present.     Cardiovascular: Normal rate and regular rhythm.  Normal peripheral perfusion with good color.  Pulmonary/Chest: Normal effort.  No accessory muscle use.  No clubbing, no cyanosis.  Abdominal: Soft. There is no tenderness. There is no guarding.     Musculoskeletal: No swelling, deformity or ecchymosis.    Neurological: Pt is alert and oriented to person, place, and  time. No cranial nerve deficit.     Skin: Skin is warm and dry.   Psychiatric: Normal mood and affect. Thought content normal.         Fistula in the left forearm has a palpable thrill audible bruit, the area of swelling he is pointing to is a small vessel that is a few centimeters proximal to the fistula.       No erythema induration no fluctuance      Extremities warm, perfused with palpable radial pulse.      Decreased breath sounds right base no crackles no wheezes    Physical Exam                ED Course     Labs Reviewed   CBC WITH DIFFERENTIAL WITH PLATELET - Abnormal; Notable for the following components:       Result Value    RBC 2.61 (*)     HGB 8.4 (*)     HCT 24.7 (*)     All other components within normal limits   COMP METABOLIC PANEL (14) - Abnormal; Notable for the following components:    Glucose 229 (*)     BUN 53 (*)     Creatinine 8.12 (*)     Calculated Osmolality 306 (*)     eGFR-Cr 8 (*)     AST 53 (*)     ALT 84 (*)     All other components within normal limits   TROPONIN I HIGH SENSITIVITY - Abnormal; Notable for the following components:    Troponin I (High Sensitivity) 271 (*)     All other components within normal limits   RAPID SARS-COV-2 BY PCR - Normal   POCT FLU TEST - Normal    Narrative:     This assay is a rapid molecular in vitro test utilizing nucleic acid amplification of influenza A and B viral RNA.   LIPID PANEL   PRO BETA NATRIURETIC PEPTIDE   RAINBOW DRAW LAVENDER   RAINBOW DRAW LIGHT GREEN   RAINBOW DRAW BLUE     EKG    Rate, intervals and axes as noted on EKG Report.  Rate: 94  Rhythm: Sinus Rhythm  Reading: Sinus rhythm with LVH not changed compared to previous              Results            Blood reviewed, troponin is chronically elevated improved from more recent measurements.  Remainder blood work is at his baseline.        In reviewing prior records, his last hospitalization was on March 30 he was admitted for dialysis.                     MDM          Differential  diagnoses considered: Heart failure, hypertensive urgency, tension emergency, fluid overload, pneumonia, diverticulitis all considered      -No acute process in the abdomen  - Vitals have been fine.  - CT scan shows chronic/persistent changes in the right lower chest    -Patient has been satting fine on room air and ambulated well but then developed shortness of breath and some chest pressure.    -At he states that he is not at his baseline and he endorses some PND and orthopnea.    -Does not look overtly overloaded but  may not be at his dry weight.    Discussed going home with close follow-up-but he feels like that this is not a good idea given his respiratory status.  As such we will meet him for observation.    Patient will be admitted primarily to the [facility] hospitalist.    Care has been discussed with the admitting physician.    Nephrology to see the morning      I visualized the radiology studies, my independent interpretation: Persistent changes in the right lower chest noted on x-ray, pneumonia versus effusion          *Discussion of ongoing management of this patient's care included:  admitting physician  *Comorbidities contributing to the complexity of decision making:  ESRD on dialysis, heart failure  *External charts reviewed:  as noted above      Shared decision making was done by: patient, myself.      Admission disposition: 4/20/2025  2:16 AM           Medical Decision Making      Disposition and Plan     Clinical Impression:  1. RAMSAY (dyspnea on exertion)         Disposition:  Admit  4/20/2025  2:16 am    Follow-up:  No follow-up provider specified.        Medications Prescribed:  Current Discharge Medication List          Supplementary Documentation:         Hospital Problems       Present on Admission  Date Reviewed: 3/30/2025          ICD-10-CM Noted POA    * (Principal) RAMSAY (dyspnea on exertion) R06.09 4/20/2025 Unknown

## 2025-04-20 NOTE — ED QUICK NOTES
Orders for admission, patient is aware of plan and ready to go upstairs. Any questions, please call ED RN Jayme at extension [phone].     Patient Covid vaccination status: Fully vaccinated     COVID Test Ordered in ED: Rapid SARS-CoV-2 by PCR    COVID Suspicion at Admission: N/A    Running Infusions: Medication Infusions[1] None    Mental Status/LOC at time of transport: A x O 4    Other pertinent information:   CIWA score: N/A   NIH score:  N/A             [1]

## 2025-04-20 NOTE — PLAN OF CARE
Assumed care @ 0355  A&Ox4  RA  NSR on tele  Renal Diet  Pain management   Up ad osvaldo   Safety precautions in place  Continuing to meet pt needs  Meds iN MAR given  Need further details reference flowsheets  Problem: Patient/Family Goals  Goal: Patient/Family Long Term Goal  Description: Patient's Long Term Goal: discharge  Interventions: follow plan of care  - See additional Care Plan goals for specific interventions  Outcome: Progressing  Goal: Patient/Family Short Term Goal  Description: Patient's Short Term Goal: pain management, BP management  Interventions: PRN medications   - See additional Care Plan goals for specific interventions  Outcome: Progressing     Problem: PAIN - ADULT  Goal: Verbalizes/displays adequate comfort level or patient's stated pain goal  Description: INTERVENTIONS:- Encourage pt to monitor pain and request assistance- Assess pain using appropriate pain scale- Administer analgesics based on type and severity of pain and evaluate response- Implement non-pharmacological measures as appropriate and evaluate response- Consider cultural and social influences on pain and pain management- Manage/alleviate anxiety- Utilize distraction and/or relaxation techniques- Monitor for opioid side effects- Notify MD/LIP if interventions unsuccessful or patient reports new pain- Anticipate increased pain with activity and pre-medicate as appropriate  Outcome: Progressing

## 2025-04-20 NOTE — PROGRESS NOTES
NURSING ADMISSION NOTE      Patient admitted via Ambulance  Oriented to room.  Safety precautions initiated.  Bed in low position.  Call light in reach.    Admission navigator completed with patient by this RN. A/Ox4, on room air, NSR on tele. Renal diet. Ambulates, voids. PRN norco administered per mar. Heparin subQ for VTE. PIV saline locked. Patient updated with plan of care. All needs met at this time.    yes yes

## 2025-04-20 NOTE — H&P
[facility] Hospitalist H&P       CC:   Chief Complaint   Patient presents with    Abdomen/Flank Pain    Difficulty Breathing        PCP: Adrian Small MD    History of Present Illness:Patient is a 45 year old male with PMH sig for ESRD on MWF HD, hemorrhoids, diverticulosis, hypertension, bipolar disorder, history of frequent readmissions who presented for evaluation of shortness of breath.  Patient states that he has been feeling shortness of breath for the past 2 weeks.  He had a recent admission at the end of March and has had multiple similar nature admissions.  He is getting more fluid removed at his dialysis sessions yet he feels short of breath.  Denies any chest pain, denies any fevers chills diarrhea nausea vomiting urinary symptoms or any other complaints.  He does note that he is having some pain in his left arm at the site of his fistula, he notes that his arm feels more \"puffy\" than usual.  He states that at his last dialysis session when they tried to disconnect him he \"bled a lot\" and has been having similar episodes lately.  His last dialysis was on Friday.  Unfortunately, in March alone, patient was admitted 6 times.  In the ER, vital signs significant for blood pressure 191/115.  Labs consistent with chronic ESRD.  Patient was admitted for further workup and management.  When I evaluated the patient at bedside, he is laying comfortably in bed.  He is laying flat without needing any oxygen.    PMH  Past Medical History[1]     PSH  Past Surgical History[2]     ALL:  Allergies[3]     Home Medications:  Medications Taking[4]      Soc Hx  Social History     Tobacco Use    Smoking status: Former     Current packs/day: 0.00     Average packs/day: 1 pack/day for 27.0 years (27.0 ttl pk-yrs)     Types: Cigarettes     Start date: 2/28/1995     Quit date: 2/28/2022     Years since quitting: 3.1     Passive exposure: Never    Smokeless tobacco: Never   Substance Use Topics    Alcohol use: No        Fam Hx  Family  History[5]    Review of Systems  Comprehensive ROS reviewed and negative except for what's stated above.     OBJECTIVE:  BP (!) 175/113   Pulse 92   Temp 98.2 °F (36.8 °C) (Oral)   Resp 14   Ht 6' 2\" (1.88 m)   Wt 229 lb 14.4 oz (104.3 kg)   SpO2 97%   BMI 29.52 kg/m²   General:  Alert, no distress   Head:  Normocephalic, without obvious abnormality, atraumatic.   Eyes:  Sclera anicteric, No conjunctival pallor, EOMs intact.    Nose: Nares normal. Septum midline. Mucosa normal. No drainage.   Throat: Lips, mucosa, and tongue normal.     Neck: Supple, symmetrical, trachea midline,   Lungs:   Clear to auscultation bilaterally. Normal effort   Chest wall:  No tenderness or deformity.   Heart:  Regular rate and rhythm, S1, S2 normal,    Abdomen:   Soft, non-tender. Bowel sounds normal.  Non distended   Extremities: Extremities normal, atraumatic, left arm with fistula, with slightly more swelling than the right, no erythema, drainage appreciated, no warmth or signs of infection noted externally.  Patient endorses pain with deep palpation.  Fistula has good thrill   Skin: Skin color, texture, turgor normal. No rashes or lesions.    Neurologic: Normal strength, no focal deficit appreciated     Diagnostic Data:    CBC/Chem  Recent Labs   Lab 04/19/25 2208   WBC 6.0   HGB 8.4*   MCV 94.6   .0       Recent Labs   Lab 04/19/25 2208 04/20/25  0658    141   K 3.6 3.7    100   CO2 25.0 26.0   BUN 53* 53*   CREATSERUM 8.12* 8.84*   * 101*   CA 8.9 9.4   PHOS  --  6.2*       Recent Labs   Lab 04/19/25 2208 04/20/25  0658   ALT 84*  --    AST 53*  --    ALB 4.1 4.1       No results for input(s): \"TROP\" in the last 168 hours.    CXR: image personally reviewed     Radiology: CT CHEST+ABDOMEN+PELVIS(CPT=71250/14997)  Result Date: 4/20/2025  CONCLUSION:   1. No acute process identified within the chest, abdomen, or pelvis.  2. Scarring and volume loss of the right lung, similar compared to  03/29/2025.  Stable small right pleural effusion.  Stable mediastinal lymphadenopathy.  3. Punctate hyperdensity of the right distal ureter near the UVJ may reflect urolithiasis, though without evidence of collecting system obstruction.  4. Pancolonic diverticulosis without evidence of acute diverticulitis.      LOCATION:  [facility]    Dictated by (CST): Claude Singh MD on 4/20/2025 at 0:37 AM     Finalized by (CST): Claude Singh MD on 4/20/2025 at 0:46 AM       XR CHEST AP PORTABLE  (CPT=71045)  Result Date: 4/19/2025  CONCLUSION:  Stable cardiac and mediastinal contours with valvular prosthesis noted.  There continues to be confluent opacification of the right lower lung which likely represents some combination of atelectasis, airspace disease, and pleural effusion.  No appreciable pneumothorax.   LOCATION:  [facility]      Dictated by (CST): Claude Singh MD on 4/19/2025 at 11:02 PM     Finalized by (CST): Claude Singh MD on 4/19/2025 at 11:03 PM           ASSESSMENT / PLAN:     Patient is a 45 year old male with PMH sig for ESRD on MWF HD, hemorrhoids, diverticulosis, hypertension, bipolar disorder, history of frequent readmissions who presented for evaluation of shortness of breath.     Shortness of breath  ESRD on HD MWF  Hypertension  -CT chest abdomen pelvis obtained in the ER, not suggestive of fluid overload, patient clinically on room air laying flat  - No urgent indication for dialysis, discussed with nephrology  - Likely dialysis tomorrow once fistula is addressed  - Patient has had evaluation per cardiology 3 times in the last month, if complains of chest pain will consider cardiology,, currently patient is chest pain-free  -Resume home nifedipine losartan hydralazine Coreg    Rule out left fistula infection  - Clinically does not appear erythematous warm, has a good thrill but patient endorses pain and noted to have some swelling  - Discussed with  - vascular surgery-had a recent ultrasound in the  outpatient setting and was noted to be normal, holding further imaging until seen by vascular surgery.  -Holding IV antibiotics at this time as concern for infection is low given lack of symptoms  -Discussed with Dr. Jovel  - po norco prn for pain, no indication for IV pain meds at this time. Concern for dependence.     Bipolar disorder  - Continue bupropion and Lamictal    HFpEF, chronic  - Volume status with dialysis    FN:  - IVF:none  - Diet: cardiac    DVT Prophy: scd heparin subcu  Atrophy: ambulate as mary  Lines: piv    Dispo: Admit    Outpatient records or previous hospital records reviewed.     Further recommendations pending patient's clinical course.  [facility] hospitalist to continue to follow patient while in house    Patient and/or patient's family given opportunity to ask questions and note understanding and agreeing with therapeutic plan as outlined    Thank You,  DO Kevin Pritchard Hospitalist  Answering Service number: [phone]         [1]   Past Medical History:   Anxiety state    Arrhythmia    Asthma (HCC)    Attention deficit hyperactivity disorder (ADHD)    Back problem    Bipolar 1 disorder (HCC)    CKD (chronic kidney disease) stage 3, GFR 30-59 ml/min ([facility])    Dr Meeks    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[facility])    Congestive heart disease (HCC)    COPD (chronic obstructive pulmonary disease) ([facility])    Coronary atherosclerosis    Deep vein thrombosis ([facility])    at age 17 R/T cast    Depression    Diabetes (HCC)    Dialysis patient    Diverticulosis of large intestine    Essential hypertension    3/21 echo: severe concentric LVH with normal EF and no MR or pHTN    Extrinsic asthma, unspecified    Heart attack (HCC)    2016- angiogram- no intervention    Heart valve disease    mitral valve repair in 1994/    High blood pressure    High cholesterol    History of blood transfusion    History of mitral valve repair    Hyperlipidemia    Low back pain    tight and stiff after sweeping and mopping     LVH (left ventricular hypertrophy)    Migraines    Mixed hyperlipidemia     HDL 38 LDL 97 VLDL 57     Monoclonal gammopathy    IgG kappa     Muscle weakness    MVP (mitral valve prolapse)    Repair 1994 at [place]; echoes as recently as 3/21 show mild or trivial MR and no stenosis    Neuropathy    Osteoarthritis    hip ,knees    Pneumonia due to organism    Pulmonary embolism (HCC)    Renal disorder    Stroke (HCC)    TIA (transient ischemic attack)    Initial history of left-sided weakness and slurred speech. (+) cocaine. MRI of the brain, CT angiogram of the head and neck, and 2D echo are all unremarkable.     TMJ (dislocation of temporomandibular joint)    Troponin level elevated    Trop 60 60 47 with TIA and no CP: Lexiscan negative with EF 51    Visual impairment   [2]   Past Surgical History:  Procedure Laterality Date    Av fistula revision, open Left     Cabg      Colonoscopy N/A 03/26/2023    Procedure: COLONOSCOPY;  Surgeon: Heath Vu MD;  Location:  ENDOSCOPY    Colonoscopy N/A 12/30/2023    Procedure: COLONOSCOPY with cold snare polypectomy and forcep polypectomy;  Surgeon: Ousmane Suarez MD;  Location:  ENDOSCOPY    Colonoscopy N/A 3/9/2025    Procedure: COLONOSCOPY WITH COLD SNARE POLYPECTOMY AND ENDO CLIPS X 2;  Surgeon: Bakari Noguera MD;  Location:  ENDOSCOPY    Colonoscopy & polypectomy  2019    [facility]  2019    Duodenitis. Biopsied. EUS for weight loss was negative    Heart surgery      Hernia surgery  08/17/2022    Dr Barnes    Laminectomy,>2 sgmt,lumbar  09/06/2018    L4-L5 Decomp Discectomy ROEM L4-L5    Mitralplasty w cp bypass  1994    [place]: Repair    Repair rotator cuff,chronic Left     torn and had a ruptured bicep    Sinus surgery        Spine surgery procedure unlisted      Valve repair  1994    mitral valve   [3]   Allergies  Allergen Reactions    Hydrochlorothiazide RASH and HIVES   [4]   Outpatient Medications Marked as Taking for the 4/19/25 encounter  (Hospital Encounter)   Medication Sig Dispense Refill    tiotropium 18 MCG Inhalation Cap Inhale 1 capsule (18 mcg total) into the lungs in the morning.      cyclobenzaprine 5 MG Oral Tab Take 1 tablet (5 mg total) by mouth 3 (three) times daily as needed.      pantoprazole 40 MG Oral Tab EC Take 1 tablet (40 mg total) by mouth every morning before breakfast.      polyethylene glycol, PEG 3350, 17 g Oral Powd Pack Take 17 g by mouth daily as needed.      atorvastatin 20 MG Oral Tab Take 1 tablet (20 mg total) by mouth every evening.      HYDROcodone-acetaminophen  MG Oral Tab Take 1 tablet by mouth every 6 (six) hours as needed for Pain.      hydrALAZINE 25 MG Oral Tab Take 1 tablet (25 mg total) by mouth in the morning and 1 tablet (25 mg total) in the evening and 1 tablet (25 mg total) before bedtime.      cloNIDine 0.1 MG Oral Tab Take 1 tablet (0.1 mg total) by mouth 2 (two) times daily. 60 tablet 0    NIFEdipine ER 30 MG Oral Tablet 24 Hr Take 1 tablet (30 mg total) by mouth daily. 30 tablet 0    sevelamer carbonate 800 MG Oral Tab Take 3 tablets (2,400 mg total) by mouth 3 (three) times daily with meals. 270 tablet 0    calcium acetate 667 MG Oral Cap Take 1 capsule (667 mg total) by mouth with breakfast, with lunch, and with evening meal. 90 capsule 0    ARIPiprazole 5 MG Oral Tab Take 1 tablet (5 mg total) by mouth in the morning. Take 5mg (1 tablet) by mouth daily. Take each dose with 1 tablet of 10mg aripiprazole for a total daily dose of 15mg aripiprazole.      ARIPiprazole 10 MG Oral Tab Take 1 tablet (10 mg total) by mouth in the morning. Take 10mg (1 tablet) by mouth daily. Take each dose with 1 tablet of 5mg aripiprazole for a total daily dose of 15mg aripiprazole. .      ergocalciferol 1.25 MG (57443 UT) Oral Cap Take 1 capsule (50,000 Units total) by mouth once a week. Every Monday.      losartan 100 MG Oral Tab Take 1 tablet (100 mg total) by mouth in the morning.      ondansetron 4 MG Oral  Tablet Dispersible Take 1 tablet (4 mg total) by mouth every 8 (eight) hours as needed for Nausea.      gabapentin 100 MG Oral Cap Take 2 capsules (200 mg total) by mouth 3 (three) times daily. 180 capsule 0    VYVANSE 60 MG Oral Cap Take 1 capsule (60 mg total) by mouth every morning.      lamoTRIgine 100 MG Oral Tab Take 1 tablet (100 mg total) by mouth in the morning.      albuterol 108 (90 Base) MCG/ACT Inhalation Aero Soln Inhale 2 puffs into the lungs every 6 (six) hours as needed for Wheezing.      tamsulosin 0.4 MG Oral Cap Take 1 capsule (0.4 mg total) by mouth in the morning.      buPROPion  MG Oral Tablet 24 Hr Take 1 tablet (150 mg total) by mouth in the morning.      FLUoxetine HCl 40 MG Oral Cap Take 1 capsule (40 mg total) by mouth daily. 7 capsule 0    carvedilol 25 MG Oral Tab Take 1 tablet (25 mg total) by mouth in the morning and 1 tablet (25 mg total) in the evening. Take with meals. 60 tablet 6    Fluticasone Propionate 50 MCG/ACT Nasal Suspension SPRAY ONCE INTO EACH NOSTRIL BID PRN 15.8 mL 0   [5]   Family History  Problem Relation Age of Onset    Hypertension Father     Alcohol and Other Disorders Associated Father     Substance Abuse Father         cocaine    Dementia Father     Cancer Father         lung    Diabetes Mother     Cancer Mother         multiple myeloma    Hypertension Mother     Anxiety Maternal Aunt     Depression Maternal Aunt     Anxiety Maternal Aunt     Depression Maternal Aunt     Bipolar Disorder Maternal Aunt     Diabetes Maternal Grandmother     Hypertension Maternal Grandmother     Cancer Maternal Grandfather         stomach cancer    Diabetes Maternal Grandfather     Hypertension Maternal Grandfather     Alcohol and Other Disorders Associated Maternal Grandfather     Hypertension Paternal Grandmother     Hypertension Paternal Grandfather     Cancer Sister         uterine and ovarian    Hypertension Sister     Cancer Maternal Uncle         lung    Cancer  Paternal Aunt         throat

## 2025-04-20 NOTE — ED INITIAL ASSESSMENT (HPI)
"[facility]  RDA " Pt to ED with SOB x1 week, left sided abdominal pain with +n/v. Pt also reports swelling to fistula on left arm. Last hemodialysis was yesterday.    Method  RDA Method (mL): 1680     

## 2025-04-20 NOTE — PLAN OF CARE
Problem: Patient/Family Goals  Goal: Patient/Family Long Term Goal  Description: Patient's Long Term Goal: discharge  Interventions: follow plan of care  - See additional Care Plan goals for specific interventions  Outcome: Progressing  Goal: Patient/Family Short Term Goal  Description: Patient's Short Term Goal: pain management, BP management  Interventions: PRN medications   - See additional Care Plan goals for specific interventions  Outcome: Progressing     Problem: PAIN - ADULT  Goal: Verbalizes/displays adequate comfort level or patient's stated pain goal  Description: INTERVENTIONS:- Encourage pt to monitor pain and request assistance- Assess pain using appropriate pain scale- Administer analgesics based on type and severity of pain and evaluate response- Implement non-pharmacological measures as appropriate and evaluate response- Consider cultural and social influences on pain and pain management- Manage/alleviate anxiety- Utilize distraction and/or relaxation techniques- Monitor for opioid side effects- Notify MD/LIP if interventions unsuccessful or patient reports new pain- Anticipate increased pain with activity and pre-medicate as appropriate  Outcome: Progressing

## 2025-04-21 ENCOUNTER — APPOINTMENT (OUTPATIENT)
Dept: INTERVENTIONAL RADIOLOGY/VASCULAR | Facility: HOSPITAL | Age: 45
End: 2025-04-21
Attending: SURGERY
Payer: MEDICARE

## 2025-04-21 LAB
ALBUMIN SERPL-MCNC: 3.9 G/DL (ref 3.2–4.8)
ANION GAP SERPL CALC-SCNC: 13 MMOL/L (ref 0–18)
BASOPHILS # BLD AUTO: 0.08 X10(3) UL (ref 0–0.2)
BASOPHILS NFR BLD AUTO: 1.8 %
BUN BLD-MCNC: 63 MG/DL (ref 9–23)
CALCIUM BLD-MCNC: 8.8 MG/DL (ref 8.7–10.6)
CHLORIDE SERPL-SCNC: 103 MMOL/L (ref 98–112)
CO2 SERPL-SCNC: 25 MMOL/L (ref 21–32)
CREAT BLD-MCNC: 10.21 MG/DL (ref 0.7–1.3)
EGFRCR SERPLBLD CKD-EPI 2021: 6 ML/MIN/1.73M2 (ref 60–?)
EOSINOPHIL # BLD AUTO: 0.3 X10(3) UL (ref 0–0.7)
EOSINOPHIL NFR BLD AUTO: 6.7 %
ERYTHROCYTE [DISTWIDTH] IN BLOOD BY AUTOMATED COUNT: 13.2 %
GLUCOSE BLD-MCNC: 108 MG/DL (ref 70–99)
HCT VFR BLD AUTO: 23.2 % (ref 39–53)
HGB BLD-MCNC: 8 G/DL (ref 13–17.5)
IMM GRANULOCYTES # BLD AUTO: 0 X10(3) UL (ref 0–1)
IMM GRANULOCYTES NFR BLD: 0 %
LYMPHOCYTES # BLD AUTO: 1.35 X10(3) UL (ref 1–4)
LYMPHOCYTES NFR BLD AUTO: 29.9 %
MAGNESIUM SERPL-MCNC: 2.2 MG/DL (ref 1.6–2.6)
MCH RBC QN AUTO: 32.3 PG (ref 26–34)
MCHC RBC AUTO-ENTMCNC: 34.5 G/DL (ref 31–37)
MCV RBC AUTO: 93.5 FL (ref 80–100)
MONOCYTES # BLD AUTO: 0.5 X10(3) UL (ref 0.1–1)
MONOCYTES NFR BLD AUTO: 11.1 %
NEUTROPHILS # BLD AUTO: 2.28 X10 (3) UL (ref 1.5–7.7)
NEUTROPHILS # BLD AUTO: 2.28 X10(3) UL (ref 1.5–7.7)
NEUTROPHILS NFR BLD AUTO: 50.5 %
OSMOLALITY SERPL CALC.SUM OF ELEC: 311 MOSM/KG (ref 275–295)
PHOSPHATE SERPL-MCNC: 6.5 MG/DL (ref 2.4–5.1)
PLATELET # BLD AUTO: 188 10(3)UL (ref 150–450)
POTASSIUM SERPL-SCNC: 3.6 MMOL/L (ref 3.5–5.1)
RBC # BLD AUTO: 2.48 X10(6)UL (ref 4.3–5.7)
SODIUM SERPL-SCNC: 141 MMOL/L (ref 136–145)
WBC # BLD AUTO: 4.5 X10(3) UL (ref 4–11)

## 2025-04-21 PROCEDURE — 85025 COMPLETE CBC W/AUTO DIFF WBC: CPT | Performed by: HOSPITALIST

## 2025-04-21 PROCEDURE — 90935 HEMODIALYSIS ONE EVALUATION: CPT | Performed by: INTERNAL MEDICINE

## 2025-04-21 PROCEDURE — 99153 MOD SED SAME PHYS/QHP EA: CPT | Performed by: RADIOLOGY

## 2025-04-21 PROCEDURE — 99152 MOD SED SAME PHYS/QHP 5/>YRS: CPT | Performed by: RADIOLOGY

## 2025-04-21 PROCEDURE — 83735 ASSAY OF MAGNESIUM: CPT | Performed by: STUDENT IN AN ORGANIZED HEALTH CARE EDUCATION/TRAINING PROGRAM

## 2025-04-21 PROCEDURE — 96372 THER/PROPH/DIAG INJ SC/IM: CPT

## 2025-04-21 PROCEDURE — 36901 INTRO CATH DIALYSIS CIRCUIT: CPT | Performed by: RADIOLOGY

## 2025-04-21 PROCEDURE — 80069 RENAL FUNCTION PANEL: CPT | Performed by: HOSPITALIST

## 2025-04-21 RX ORDER — HEPARIN SODIUM 5000 [USP'U]/ML
INJECTION, SOLUTION INTRAVENOUS; SUBCUTANEOUS
Status: COMPLETED
Start: 2025-04-21 | End: 2025-04-21

## 2025-04-21 RX ORDER — MIDAZOLAM HYDROCHLORIDE 1 MG/ML
INJECTION INTRAMUSCULAR; INTRAVENOUS
Status: COMPLETED
Start: 2025-04-21 | End: 2025-04-21

## 2025-04-21 RX ORDER — TRAMADOL HYDROCHLORIDE 50 MG/1
50 TABLET ORAL EVERY 12 HOURS PRN
Status: DISCONTINUED | OUTPATIENT
Start: 2025-04-21 | End: 2025-04-24

## 2025-04-21 RX ORDER — ACETAMINOPHEN 500 MG
500 TABLET ORAL EVERY 4 HOURS PRN
Status: DISCONTINUED | OUTPATIENT
Start: 2025-04-21 | End: 2025-04-24

## 2025-04-21 RX ORDER — IODIXANOL 320 MG/ML
50 INJECTION, SOLUTION INTRAVASCULAR
Status: COMPLETED | OUTPATIENT
Start: 2025-04-21 | End: 2025-04-21

## 2025-04-21 RX ORDER — LIDOCAINE HYDROCHLORIDE 10 MG/ML
INJECTION, SOLUTION INFILTRATION; PERINEURAL
Status: COMPLETED
Start: 2025-04-21 | End: 2025-04-21

## 2025-04-21 NOTE — PROGRESS NOTES
[facility]   part of [facility]    Nephrology Progress Note    Godwin Fonseca Attending:  Yuriy Forrest MD     Cc: ESRD    SUBJECTIVE     Notes pain in LUE still, going for procedure w IR. No other complaints    PHYSICAL EXAM   Vital signs: /88 (BP Location: Right arm)   Pulse 79   Temp 98 °F (36.7 °C) (Oral)   Resp 23   Ht 6' 2\" (1.88 m)   Wt 229 lb 14.4 oz (104.3 kg)   SpO2 97%   BMI 29.52 kg/m²   Temp (24hrs), Av.2 °F (36.8 °C), Min:98 °F (36.7 °C), Max:98.4 °F (36.9 °C)       Intake/Output Summary (Last 24 hours) at 2025 1238  Last data filed at 2025 0428  Gross per 24 hour   Intake --   Output 850 ml   Net -850 ml     Wt Readings from Last 3 Encounters:   25 229 lb 14.4 oz (104.3 kg)   25 240 lb (108.9 kg)   25 247 lb 2.2 oz (112.1 kg)     General: NAD  HEENT: NCAT, EOMI, MMM  Neck: Supple   Cardiac: Regular rate and rhythm   Lungs: CTAB  Abdomen: Soft, non-tender, nondistended   Extremities: No edema  Neurologic: No asterxis  Skin: Warm and dry, no rashes     MEDS   Current Hospital Medications[1]    LABS     Lab Results   Component Value Date    WBC 4.5 2025    HGB 8.0 2025    HCT 23.2 2025    .0 2025    CREATSERUM 10.21 2025    BUN 63 2025     2025    K 3.6 2025     2025    CO2 25.0 2025     2025    CA 8.8 2025    ALB 3.9 2025    MG 2.2 2025    PHOS 6.5 2025       IMAGING   All imaging studies personally reviewed.    CT CHEST+ABDOMEN+PELVIS(CPT=71250/51874)   Final Result   PROCEDURE:  CT CHEST+ABDOMEN+PELVIS(CPT=71250/73181)       COMPARISON:  [facility], CT, CTA CHEST + CT ABD (W) + CT PEL (W)    SH(CPT=71275/69544), 3/29/2025, 4:34 PM.       INDICATIONS:  abnl cxr, SOB, llq pain       TECHNIQUE:  Following oral contrast administration, unenhanced multislice    CT scanning is performed through the chest, abdomen, and pelvis.  Dose     reduction techniques were used. Dose information is transmitted to the ACR    (American College of Radiology)    NRDR (National Radiology Data Registry) which includes the Dose Index    Registry.       PATIENT STATED HISTORY: (As transcribed by Technologist)  Patient reports    shortness of breath, lower left abdominal pain and generalized weakness.             FINDINGS:         CHEST:     Left atrial enlargement.  No pericardial effusion.  Mitral valve    prosthesis noted.  Mild coronary calcification.  Normal caliber of the    thoracic aorta and pulmonary trunk.  Enlarged lymph nodes of the    paratracheal station are stable, index right lower    paratracheal node measuring 1.6 cm (series 2, image 45).  Normal thyroid    and esophagus.  No central airway stenosis.       Stable right lung volume loss with areas of partial atelectasis or scar of    the right middle and right lower lobes.  Upper lobe predominant emphysema.     Mild mosaic attenuation suggesting mild air trapping.  No acute airspace    disease.  No new or enlarging    pulmonary nodules.  Small right pleural effusion, similar to prior.  No    pneumothorax.       Regional soft tissues are within normal limits.  No fracture or aggressive    osseous lesion.  Suture anchors of the left humeral head noted.       ABDOMEN/PELVIS:   Normal liver morphology.  Parenchymal hyperdensity of the liver may    reflect sequela of amiodarone therapy.  Normal gallbladder, bile ducts,    spleen, pancreas, and adrenal glands.  Bilateral renal cortical atrophy.     Subtle hyperdensity of the right distal    ureter near the UVJ (series 2, image 234, 235) may represent small    ureteral calculi, though there is no evidence of collecting system    obstruction.       Normal stomach.  No evidence of bowel inflammation or obstruction.  Normal    appendix.  Pancolonic diverticulosis without evidence of acute    diverticulitis.       Concentric bladder wall thickening, likely  accentuated by under    distention.  Normal prostate and seminal vesicles.       No ascites, pneumoperitoneum, or regional lymphadenopathy.       No abdominal aortic aneurysm.       Regional soft tissues are within normal limits.  Osteoarthritis of the    hips and spine.  No aggressive osseous lesions.                         =====   CONCLUSION:         1. No acute process identified within the chest, abdomen, or pelvis.       2. Scarring and volume loss of the right lung, similar compared to    03/29/2025.  Stable small right pleural effusion.  Stable mediastinal    lymphadenopathy.       3. Punctate hyperdensity of the right distal ureter near the UVJ may    reflect urolithiasis, though without evidence of collecting system    obstruction.       4. Pancolonic diverticulosis without evidence of acute diverticulitis.                     LOCATION:  [facility]               Dictated by (CST): Claude Singh MD on 4/20/2025 at 0:37 AM        Finalized by (CST): Claude Singh MD on 4/20/2025 at 0:46 AM         XR CHEST AP PORTABLE  (CPT=71045)   Final Result   PROCEDURE:  XR CHEST AP PORTABLE  (CPT=71045)       TECHNIQUE:  AP chest radiograph was obtained.       COMPARISON:  [facility], XR, XR CHEST PA + LAT CHEST (LUA=30925),    4/04/2025, 5:25 PM.       INDICATIONS:  shortness of breath, abdominal pain, problem with fistula       PATIENT STATED HISTORY: (As transcribed by Technologist)  Patient has    shortness of breath, dry cough, and chest tightness for around 2 weeks.    Patient stated tonight he started having left sided abdomen pain.             FINDINGS:                           =====   CONCLUSION:  Stable cardiac and mediastinal contours with valvular    prosthesis noted.  There continues to be confluent opacification of the    right lower lung which likely represents some combination of atelectasis,    airspace disease, and pleural effusion.     No appreciable pneumothorax.           LOCATION:  [facility]                        Dictated by (CST): Claude Singh MD on 4/19/2025 at 11:02 PM        Finalized by (CST): Claude Singh MD on 4/19/2025 at 11:03 PM         IR GRAFT PROCEDURE    (Results Pending)         ASSESSMENT & PLAN   45 year old male with PMH of ESRD on iHD MWF, HTN, MR s/p MVR, HFpEF, TIA, DVT/PE, MGUS, presents with RAMSAY:     ESRD on HD MWF:  -- [facility] in [place]  -- last HD Friday     Anemia in ESRD:  -- below goal will obtain [employer]     MBD:  -- Sevelamer 2400mg PO TID  -- Calcium Acetate 667 TID     HTN:  -- Resume Home medications     RAMSAY:  -- Does not appear to be volume mediated at this time.     LUE erythema  -- around fistula site        plan:   - Going to LUE fistulogram today w IR.   Resume iHD per MWF once cleared by vascular & IR.   Last HD Friday. If unable to do HD w fistula today or tomorrow will then need temp cath (or perm cath if unable to use long term)   - vascular surgery consult appreciated  - avoid hypotension  - renally adjust medications  - ok to use Iodinated contrast       Thank you for allowing me to participate in the care of this patient. Please do not hesitate to call with questions or concerns.       Samaria Nava MD  [facility]         [1]   Current Facility-Administered Medications   Medication Dose Route Frequency    acetaminophen (Tylenol Extra Strength) tab 500 mg  500 mg Oral Q4H PRN    traMADol (Ultram) tab 50 mg  50 mg Oral Q12H PRN    ARIPiprazole (Abilify) tab 10 mg  10 mg Oral Daily    ARIPiprazole (Abilify) tab 5 mg  5 mg Oral Daily    atorvastatin (Lipitor) tab 20 mg  20 mg Oral QPM    buPROPion ER (Wellbutrin XL) 24 hr tab 150 mg  150 mg Oral Daily    calcium acetate (Phoslo) cap 667 mg  667 mg Oral TID with meals    carvedilol (Coreg) tab 25 mg  25 mg Oral BID with meals    cloNIDine (Catapres) tab 0.1 mg  0.1 mg Oral BID    FLUoxetine (PROzac) cap 40 mg  40 mg Oral Daily    gabapentin (Neurontin) cap 200 mg  200 mg Oral TID    hydrALAZINE  (Apresoline) tab 25 mg  25 mg Oral TID    lamoTRIgine (LaMICtal) tab 100 mg  100 mg Oral Daily    losartan (Cozaar) tab 100 mg  100 mg Oral Daily    NIFEdipine ER (Procardia-XL) 24 hr tab 30 mg  30 mg Oral Daily    pantoprazole (Protonix) DR tab 40 mg  40 mg Oral QAM AC    sevelamer carbonate (Renvela) tab 2,400 mg  2,400 mg Oral TID CC    tamsulosin (Flomax) cap 0.4 mg  0.4 mg Oral Daily    amphetamine-dextroamphetamine (Adderall) tab 10 mg  10 mg Oral BID@0800,1200    HYDROcodone-acetaminophen (Norco)  MG per tab 1 tablet  1 tablet Oral Q6H PRN    cyclobenzaprine (Flexeril) tab 5 mg  5 mg Oral TID PRN    fluticasone propionate (Flonase) 50 MCG/ACT nasal suspension 2 spray  2 spray Each Nare Daily    heparin (Porcine) 5000 UNIT/ML injection 5,000 Units  5,000 Units Subcutaneous Q8H MARTHA    polyethylene glycol (PEG 3350) (Miralax) 17 g oral packet 17 g  17 g Oral Daily PRN    sennosides (Senokot) tab 17.2 mg  17.2 mg Oral Nightly PRN    bisacodyl (Dulcolax) 10 MG rectal suppository 10 mg  10 mg Rectal Daily PRN    ondansetron (Zofran) 4 MG/2ML injection 4 mg  4 mg Intravenous Q6H PRN    prochlorperazine (Compazine) 10 MG/2ML injection 5 mg  5 mg Intravenous Q8H PRN    umeclidinium bromide (Incruse Ellipta) 62.5 MCG/ACT inhaler 1 puff  1 puff Inhalation Daily    hydrALAzine (Apresoline) 20 mg/mL injection 10 mg  10 mg Intravenous Q4H PRN    albuterol (Ventolin HFA) 108 (90 Base) MCG/ACT inhaler 2 puff  2 puff Inhalation Q6H PRN

## 2025-04-21 NOTE — PLAN OF CARE
Assumed care of patient at 1930  A&Ox4, RA, NSR on tele  Reports pain, prn medications given per MAR  Renal diet  Up ad osvaldo  Safety precautions in place  All needs met at this time    Problem: Patient/Family Goals  Goal: Patient/Family Long Term Goal  Description: Patient's Long Term Goal: discharge  Interventions: follow plan of care  - See additional Care Plan goals for specific interventions  Outcome: Progressing  Goal: Patient/Family Short Term Goal  Description: Patient's Short Term Goal: pain management, BP management  Interventions: PRN medications   - See additional Care Plan goals for specific interventions  Outcome: Progressing     Problem: PAIN - ADULT  Goal: Verbalizes/displays adequate comfort level or patient's stated pain goal  Description: INTERVENTIONS:- Encourage pt to monitor pain and request assistance- Assess pain using appropriate pain scale- Administer analgesics based on type and severity of pain and evaluate response- Implement non-pharmacological measures as appropriate and evaluate response- Consider cultural and social influences on pain and pain management- Manage/alleviate anxiety- Utilize distraction and/or relaxation techniques- Monitor for opioid side effects- Notify MD/LIP if interventions unsuccessful or patient reports new pain- Anticipate increased pain with activity and pre-medicate as appropriate  Outcome: Progressing

## 2025-04-21 NOTE — PROGRESS NOTES
HD was unsuccessful with fistula. Dialysis RN got second dialysis RN to attempt. Reported arterial line had clot when pulled out per dialysis team. Possible need for cath. Nephrology notified.     LUCIE CHO updated order placed for cath, tomorrow. Vascular team updated.

## 2025-04-21 NOTE — DIETARY NOTE
[facility]   part of [facility]   CLINICAL NUTRITION    Godwin Fonseca     Admitting diagnosis:  RAMSAY (dyspnea on exertion) [R06.09]    Ht: 188 cm (6' 2\")  Wt: 104.3 kg (229 lb 14.4 oz).   Body mass index is 29.52 kg/m².  IBW: 86.4 kg    Wt Readings from Last 6 Encounters:   04/20/25 104.3 kg (229 lb 14.4 oz)   04/05/25 108.9 kg (240 lb)   04/04/25 112.1 kg (247 lb 2.2 oz)   03/30/25 112.5 kg (248 lb 0.3 oz)   03/27/25 109.9 kg (242 lb 4.6 oz)   03/18/25 116.6 kg (257 lb)        Pmhx - includes ESRD on MWF HD, hemorrhoids, diverticulosis, hypertension, bipolar disorder.    Labs/Meds reviewed    Diet:       Procedures    Low Fiber/Soft diet Low Fiber/Soft; Is Patient on Accuchecks? No     Percent Meals Eaten (last 3 days)       None               44 yo male. Presented for evaluation of SOB. MST triggered to see. Visited pt in room. Reports decreased appetite for some days due to taste change. Pt reports this am he had some clear liquids that tasted OK and is currently hungry. Will order ONS daily to supplement PO intake. Noted hyperphosphatemia, Phoslo ordered TID. Will monitor PO intake during admission.     Patient is at low nutrition risk at this time. Please consult if patient status changes or nutrition issues arise.    Magali Johnson RDN, LDN, [facility]  Clinical Dietitian   [id number]

## 2025-04-21 NOTE — PROGRESS NOTES
[facility] Hospitalist Progress Note     PCP: Adrian Small MD    Chief Complaint: follow-up   Follow up for: The encounter diagnosis was RAMSAY (dyspnea on exertion).    Overnight/Interim Events:      SUBJECTIVE:  Attempted to see earlier, at procedure. Some pain in arm. On 3L. No cp, some SOB.       OBJECTIVE:  Temp:  [98 °F (36.7 °C)-98.4 °F (36.9 °C)] 98 °F (36.7 °C)  Pulse:  [79-92] 79  Resp:  [15-32] 23  BP: (122-154)/() 128/88  SpO2:  [95 %-98 %] 97 %    Intake/Output:    Intake/Output Summary (Last 24 hours) at 4/21/2025 1503  Last data filed at 4/21/2025 0428  Gross per 24 hour   Intake --   Output 850 ml   Net -850 ml       Last 3 Weights   04/20/25 0452 229 lb 14.4 oz (104.3 kg)   04/19/25 2126 240 lb (108.9 kg)   04/05/25 1044 240 lb (108.9 kg)   04/04/25 1532 247 lb 2.2 oz (112.1 kg)       Exam    Throat: Lips, mucosa, and tongue normal.     Neck: Supple, symmetrical, trachea midline,   Lungs:   Clear to auscultation bilaterally. Normal effort   Chest wall:  No tenderness or deformity.   Heart:  Regular rate and rhythm, S1, S2 normal,    Abdomen:   Soft, non-tender. Bowel sounds normal.  Non distended   Extremities: Extremities normal, atraumatic, left arm with fistula, with slightly more swelling than the right, no erythema, drainage appreciated, no warmth or signs of infection noted externally.  Patient endorses pain with deep palpation.  Fistula has good thrill   Skin: Skin color, texture, turgor normal. No rashes or lesions.    Neurologic: Normal strength, no focal deficit appreciated      Data Review:       Labs:     Recent Labs   Lab 04/19/25 2208 04/21/25  0642   WBC 6.0 4.5   HGB 8.4* 8.0*   MCV 94.6 93.5   .0 188.0       Recent Labs   Lab 04/19/25 2208 04/20/25  0658 04/21/25  0642    141 141   K 3.6 3.7 3.6    100 103   CO2 25.0 26.0 25.0   BUN 53* 53* 63*   CREATSERUM 8.12* 8.84* 10.21*   CA 8.9 9.4 8.8   MG  --   --  2.2   PHOS  --  6.2* 6.5*   * 101* 108*        Recent Labs   Lab 04/19/25  2208 04/20/25  0658 04/21/25  0642   ALT 84*  --   --    AST 53*  --   --    ALB 4.1 4.1 3.9       No results for input(s): \"PGLU\" in the last 168 hours.    No results for input(s): \"TROP\" in the last 168 hours.      Meds:     Scheduled Medications[1]  Medication Infusions[2]  PRN Medications[3]       Assessment/Plan:     Patient is a 45 year old male with PMH sig for ESRD on MWF HD, hemorrhoids, diverticulosis, hypertension, bipolar disorder, history of frequent readmissions who presented for evaluation of shortness of breath.      Shortness of breath  ESRD on HD MWF  Hypertension  -CT chest abdomen pelvis obtained in the ER, not suggestive of fluid overload, patient clinically on room air laying flat  - No urgent indication for dialysis, was discussed with nephrology; plan for HD today, MWF schedule   - Patient has had evaluation per cardiology 3 times in the last month, if complains of chest pain will consider cardiology, currently patient is chest pain-free  -Resume home nifedipine losartan hydralazine Coreg       Rule out left fistula infection  - Clinically does not appear erythematous warm, has a good thrill but patient endorses pain and noted to have some swelling  - Was discussed with  - vascular surgery-had a recent ultrasound in the outpatient setting and was noted to be normal, holding further imaging until seen by vascular surgery.  -Holding IV antibiotics at this time as concern for infection is low given lack of symptoms  -Was discussed with Dr. Jovel  - po norco prn for pain, no indication for IV pain meds at this time. Concern for dependence. Tramadol added as states in pain   - pt states [facility] fistula  - Left arm fistulogram c mild narrowing at the level of the elbow within the venous outflow       Bipolar disorder  - Continue bupropion and Lamictal     HFpEF, chronic  - Volume status with dialysis     FN:  - IVF:none  - Diet: cardiac      DVT Prophy: scd heparin subcu  Atrophy: ambulate as mary  Lines: piv     Dispo: Admit     Questions/concerns were discussed with patient by bedside. D/w RN     Total Time spent with patient and coordinating care:  55 minutes    MD KEITH Palacios Hospitalist  [phone]  4/21/2025  3:03 PM           [1]    ARIPiprazole  10 mg Oral Daily    ARIPiprazole  5 mg Oral Daily    atorvastatin  20 mg Oral QPM    buPROPion ER  150 mg Oral Daily    calcium acetate  667 mg Oral TID with meals    carvedilol  25 mg Oral BID with meals    cloNIDine  0.1 mg Oral BID    FLUoxetine HCl  40 mg Oral Daily    gabapentin  200 mg Oral TID    hydrALAZINE  25 mg Oral TID    lamoTRIgine  100 mg Oral Daily    losartan  100 mg Oral Daily    NIFEdipine ER  30 mg Oral Daily    pantoprazole  40 mg Oral QAM AC    sevelamer carbonate  2,400 mg Oral TID CC    tamsulosin  0.4 mg Oral Daily    amphetamine-dextroamphetamine  10 mg Oral BID@0800,1200    fluticasone propionate  2 spray Each Nare Daily    heparin  5,000 Units Subcutaneous Q8H MARTHA    umeclidinium bromide  1 puff Inhalation Daily   [2] [3]   acetaminophen    traMADol    HYDROcodone-acetaminophen    cyclobenzaprine    polyethylene glycol (PEG 3350)    sennosides    bisacodyl    ondansetron    prochlorperazine    hydrALAzine    albuterol

## 2025-04-21 NOTE — PROCEDURES
[facility]   part of [facility]  Procedure Note    Godwin Fonseca Patient Status:  Observation    1980 MRN [id number]   [facility] INTERVENTIONAL SUITES Attending Yuriy Forrest MD   Hosp Day # 1 PCP Adrian Small MD     Procedure: Left arm fistulogram    Pre-Procedure Diagnosis:  Swelling    Post-Procedure Diagnosis: Same    Anesthesia:  Sedation    Findings:  Mild narrowing at the level of the elbow within the venous outflow    Specimens: None    Blood Loss:  < 5 cc    Tourniquet Time: None  Complications:  None  Drains:  None    Secondary Diagnosis:  N/A    Quinn Bernal MD  2025       Detail Level: Zone Detail Level: Detailed

## 2025-04-21 NOTE — PLAN OF CARE
Assumed pt care around 0730. Tele NSR. RA; NC 4L prn/NOC.  Fistulagram done today on left arm; okay to resume HD MWF, dialysis notified.  Renal diet.  Pt with left arm/swelling pain; tramadol for prn, would like IV pain meds.      Problem: PAIN - ADULT  Goal: Verbalizes/displays adequate comfort level or patient's stated pain goal  Description: INTERVENTIONS:- Encourage pt to monitor pain and request assistance- Assess pain using appropriate pain scale- Administer analgesics based on type and severity of pain and evaluate response- Implement non-pharmacological measures as appropriate and evaluate response- Consider cultural and social influences on pain and pain management- Manage/alleviate anxiety- Utilize distraction and/or relaxation techniques- Monitor for opioid side effects- Notify MD/LIP if interventions unsuccessful or patient reports new pain- Anticipate increased pain with activity and pre-medicate as appropriate  Outcome: Progressing     Problem: CARDIOVASCULAR - ADULT  Goal: Maintains optimal cardiac output and hemodynamic stability  Description: INTERVENTIONS:- Monitor vital signs, rhythm, and trends- Monitor for bleeding, hypotension and signs of decreased cardiac output- Evaluate effectiveness of vasoactive medications to optimize hemodynamic stability- Monitor arterial and/or venous puncture sites for bleeding and/or hematoma- Assess quality of pulses, skin color and temperature- Assess for signs of decreased coronary artery perfusion - ex. Angina- Evaluate fluid balance, assess for edema, trend weights  Outcome: Progressing  Goal: Absence of cardiac arrhythmias or at baseline  Description: INTERVENTIONS:- Continuous cardiac monitoring, monitor vital signs, obtain 12 lead EKG if indicated- Evaluate effectiveness of antiarrhythmic and heart rate control medications as ordered- Initiate emergency measures for life threatening arrhythmias- Monitor electrolytes and administer replacement therapy as  ordered  Outcome: Progressing

## 2025-04-21 NOTE — CM/SW NOTE
This is a Code 44. Patient failed inpatient criteria. Second level of review completed and supports observation.  UR committee in agreement. Discussed with Dr. Forrest  who approves observation status.  Observation order placed. LUKE given to the patient and signed copy placed in their chart.

## 2025-04-21 NOTE — CONSULTS
Vascular Surgery Consultation    Godwin Fonseca Patient Status:  Inpatient    1980 MRN [id number]   [facility] 3NE-A Attending Yuriy Forrest MD   Hosp Day # 1 PCP Adrian Small MD     Reason for Consultation:    Swelling of left upper extremity fistula    History of Present Illness: Godwin Fonseca is a a(n) 45 year old male with PMH of ESRD on iHD MWF, HTN, MR s/p MVR, HRpEF, TIA, DVT/PE who presents with shortness of breath and left upper extremity pain and swelling after dialysis. Patient states that he finished his session of dialysis when he developed prolonged bleeding after. Patient states that the dialysis center held pressure and was able to get the bleeding to stop after 5 minutes. Shortly after this he developed left arm swelling and pain which made it difficult for him to move his arm. Patient states that the swelling in his arm has slightly improved today after placing ice on it overnight. Patient denies any fevers/chills at home. He has been experiencing shortness of breath which he states was ongoing for the last two weeks. Patient states that he has had no problems with dialysis until his last session. He gets dialysis on MWF. He follows with Dr. Marx in [place]. At this time patient is laying comfortable in bed and denies feeling short of breath. Patient still with a significant amount of pain in his left upper extremity and states that he is having a difficult time moving his arm due to the swelling.     Review of Imaging: CT CHESST + ABD + Pelvis 2025    FINDINGS:       CHEST:    Left atrial enlargement.  No pericardial effusion.  Mitral valve prosthesis noted.  Mild coronary calcification.  Normal caliber of the thoracic aorta and pulmonary trunk.  Enlarged lymph nodes of the paratracheal station are stable, index right lower  paratracheal node measuring 1.6 cm (series 2, image 45).  Normal thyroid and esophagus.  No central airway stenosis.     Stable right  lung volume loss with areas of partial atelectasis or scar of the right middle and right lower lobes.  Upper lobe predominant emphysema.  Mild mosaic attenuation suggesting mild air trapping.  No acute airspace disease.  No new or enlarging  pulmonary nodules.  Small right pleural effusion, similar to prior.  No pneumothorax.     Regional soft tissues are within normal limits.  No fracture or aggressive osseous lesion.  Suture anchors of the left humeral head noted.     ABDOMEN/PELVIS:  Normal liver morphology.  Parenchymal hyperdensity of the liver may reflect sequela of amiodarone therapy.  Normal gallbladder, bile ducts, spleen, pancreas, and adrenal glands.  Bilateral renal cortical atrophy.  Subtle hyperdensity of the right distal  ureter near the UVJ (series 2, image 234, 235) may represent small ureteral calculi, though there is no evidence of collecting system obstruction.     Normal stomach.  No evidence of bowel inflammation or obstruction.  Normal appendix.  Pancolonic diverticulosis without evidence of acute diverticulitis.     Concentric bladder wall thickening, likely accentuated by under distention.  Normal prostate and seminal vesicles.     No ascites, pneumoperitoneum, or regional lymphadenopathy.     No abdominal aortic aneurysm.     Regional soft tissues are within normal limits.  Osteoarthritis of the hips and spine.  No aggressive osseous lesions.         Impression   CONCLUSION:       1. No acute process identified within the chest, abdomen, or pelvis.     2. Scarring and volume loss of the right lung, similar compared to 03/29/2025.  Stable small right pleural effusion.  Stable mediastinal lymphadenopathy.     3. Punctate hyperdensity of the right distal ureter near the UVJ may reflect urolithiasis, though without evidence of collecting system obstruction.     4. Pancolonic diverticulosis without evidence of acute diverticulitis.          History:  Past Medical History[1]  Past Surgical  History[2]  Family History[3]   reports that he quit smoking about 3 years ago. His smoking use included cigarettes. He started smoking about 30 years ago. He has a 27 pack-year smoking history. He has never been exposed to tobacco smoke. He has never used smokeless tobacco. He reports that he does not drink alcohol and does not use drugs.    Allergies:  Allergies[4]    Medications:  Current Hospital Medications[5]    Review of Systems:     12 point ROS with pertinent positives and negatives listed in the HPI     Physical Exam:  /82 (BP Location: Right arm)   Pulse 85   Temp 98.3 °F (36.8 °C) (Oral)   Resp 19   Ht 6' 2\" (1.88 m)   Wt 229 lb 14.4 oz (104.3 kg)   SpO2 98%   BMI 29.52 kg/m²   GENERAL: alert and orientated X 3, well developed, well nourished, in no apparent distress  SKIN: no rashes, no suspicious lesions, warm and dry  EXTREMITIES: No left upper extremity erythema seen on assessment. Moderate amount of left upper extremity edema with good thrill. Strong palpable left radial pulse.   NEURO/PSYCH: orientated x3, normal mood and affect, no sensory or motor deficit    ARTERIAL VASCULAR EXAM    Laboratory Data:  Lab Results   Component Value Date    WBC 4.5 04/21/2025    HGB 8.0 04/21/2025    HCT 23.2 04/21/2025    .0 04/21/2025    CREATSERUM 10.21 04/21/2025    BUN 63 04/21/2025     04/21/2025    K 3.6 04/21/2025     04/21/2025    CO2 25.0 04/21/2025     04/21/2025    CA 8.8 04/21/2025    ALB 3.9 04/21/2025    MG 2.2 04/21/2025    PHOS 6.5 04/21/2025     Impression and Plan:  44 yo male presents to the hospital with left upper extremity pain and swelling after dialysis. No left upper extremity erythema seen on assessment. Moderate amount of left upper extremity edema with good thrill. Strong palpable left radial pulse.     -NPO  -LUE Fistulagram later this afternoon with IR   -Continue to elevate arm throughout the day      Thank you for allowing me to participate in  the care of your patient.    Lydia MOY PILAR  4/21/2025  8:00 AM         [1]   Past Medical History:   Anxiety state    Arrhythmia    Asthma ([facility])    Attention deficit hyperactivity disorder (ADHD)    Back problem    Bipolar 1 disorder ([facility])    CKD (chronic kidney disease) stage 3, GFR 30-59 ml/min ([facility]ly [facility])    Dr Meeks    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[facility])    Congestive heart disease (HCC)    COPD (chronic obstructive pulmonary disease) (Formerly [facility])    Coronary atherosclerosis    Deep vein thrombosis ([facility])    at age 17 R/T cast    Depression    Diabetes ([facility])    Dialysis patient    Diverticulosis of large intestine    Essential hypertension    3/21 echo: severe concentric LVH with normal EF and no MR or pHTN    Extrinsic asthma, unspecified    Heart attack (HCC)    2016- angiogram- no intervention    Heart valve disease    mitral valve repair in 1994/    High blood pressure    High cholesterol    History of blood transfusion    History of mitral valve repair    Hyperlipidemia    Low back pain    tight and stiff after sweeping and mopping    LVH (left ventricular hypertrophy)    Migraines    Mixed hyperlipidemia     HDL 38 LDL 97 VLDL 57     Monoclonal gammopathy    IgG kappa     Muscle weakness    MVP (mitral valve prolapse)    Repair 1994 at [facility]; echoes as recently as 3/21 show mild or trivial MR and no stenosis    Neuropathy    Osteoarthritis    hip ,knees    Pneumonia due to organism    Pulmonary embolism (HCC)    Renal disorder    Stroke ([facility])    TIA (transient ischemic attack)    Initial history of left-sided weakness and slurred speech. (+) cocaine. MRI of the brain, CT angiogram of the head and neck, and 2D echo are all unremarkable.     TMJ (dislocation of temporomandibular joint)    Troponin level elevated    Trop 60 60 47 with TIA and no CP: Lexiscan negative with EF 51    Visual impairment   [2]   Past Surgical History:  Procedure Laterality Date    Av fistula revision, open Left     Cabg       Colonoscopy N/A 03/26/2023    Procedure: COLONOSCOPY;  Surgeon: Heath Vu MD;  Location:  ENDOSCOPY    Colonoscopy N/A 12/30/2023    Procedure: COLONOSCOPY with cold snare polypectomy and forcep polypectomy;  Surgeon: Ousmane Suarez MD;  Location:  ENDOSCOPY    Colonoscopy N/A 3/9/2025    Procedure: COLONOSCOPY WITH COLD SNARE POLYPECTOMY AND ENDO CLIPS X 2;  Surgeon: Bakari Noguera MD;  Location:  ENDOSCOPY    Colonoscopy & polypectomy  2019    Egd  2019    Duodenitis. Biopsied. EUS for weight loss was negative    Heart surgery      Hernia surgery  08/17/2022    Dr Barnes    Laminectomy,>2 sgmt,lumbar  09/06/2018    L4-L5 Decomp Discectomy ROEM L4-L5    Mitralplasty w cp bypass  1994    [facility]: Repair    Repair rotator cuff,chronic Left     torn and had a ruptured bicep    Sinus surgery        Spine surgery procedure unlisted      Valve repair  1994    mitral valve   [3]   Family History  Problem Relation Age of Onset    Hypertension Father     Alcohol and Other Disorders Associated Father     Substance Abuse Father         cocaine    Dementia Father     Cancer Father         lung    Diabetes Mother     Cancer Mother         multiple myeloma    Hypertension Mother     Anxiety Maternal Aunt     Depression Maternal Aunt     Anxiety Maternal Aunt     Depression Maternal Aunt     Bipolar Disorder Maternal Aunt     Diabetes Maternal Grandmother     Hypertension Maternal Grandmother     Cancer Maternal Grandfather         stomach cancer    Diabetes Maternal Grandfather     Hypertension Maternal Grandfather     Alcohol and Other Disorders Associated Maternal Grandfather     Hypertension Paternal Grandmother     Hypertension Paternal Grandfather     Cancer Sister         uterine and ovarian    Hypertension Sister     Cancer Maternal Uncle         lung    Cancer Paternal Aunt         throat   [4]   Allergies  Allergen Reactions    Hydrochlorothiazide RASH and HIVES   [5]   Current Facility-Administered  Medications:     acetaminophen (Tylenol Extra Strength) tab 500 mg, 500 mg, Oral, Q4H PRN    ARIPiprazole (Abilify) tab 10 mg, 10 mg, Oral, Daily    ARIPiprazole (Abilify) tab 5 mg, 5 mg, Oral, Daily    atorvastatin (Lipitor) tab 20 mg, 20 mg, Oral, QPM    buPROPion ER (Wellbutrin XL) 24 hr tab 150 mg, 150 mg, Oral, Daily    calcium acetate (Phoslo) cap 667 mg, 667 mg, Oral, TID with meals    carvedilol (Coreg) tab 25 mg, 25 mg, Oral, BID with meals    cloNIDine (Catapres) tab 0.1 mg, 0.1 mg, Oral, BID    FLUoxetine (PROzac) cap 40 mg, 40 mg, Oral, Daily    gabapentin (Neurontin) cap 200 mg, 200 mg, Oral, TID    hydrALAZINE (Apresoline) tab 25 mg, 25 mg, Oral, TID    lamoTRIgine (LaMICtal) tab 100 mg, 100 mg, Oral, Daily    losartan (Cozaar) tab 100 mg, 100 mg, Oral, Daily    NIFEdipine ER (Procardia-XL) 24 hr tab 30 mg, 30 mg, Oral, Daily    pantoprazole (Protonix) DR tab 40 mg, 40 mg, Oral, QAM AC    sevelamer carbonate (Renvela) tab 2,400 mg, 2,400 mg, Oral, TID CC    tamsulosin (Flomax) cap 0.4 mg, 0.4 mg, Oral, Daily    amphetamine-dextroamphetamine (Adderall) tab 10 mg, 10 mg, Oral, BID@0800,1200    HYDROcodone-acetaminophen (Norco)  MG per tab 1 tablet, 1 tablet, Oral, Q6H PRN    cyclobenzaprine (Flexeril) tab 5 mg, 5 mg, Oral, TID PRN    fluticasone propionate (Flonase) 50 MCG/ACT nasal suspension 2 spray, 2 spray, Each Nare, Daily    heparin (Porcine) 5000 UNIT/ML injection 5,000 Units, 5,000 Units, Subcutaneous, Q8H MARTHA    polyethylene glycol (PEG 3350) (Miralax) 17 g oral packet 17 g, 17 g, Oral, Daily PRN    sennosides (Senokot) tab 17.2 mg, 17.2 mg, Oral, Nightly PRN    bisacodyl (Dulcolax) 10 MG rectal suppository 10 mg, 10 mg, Rectal, Daily PRN    ondansetron (Zofran) 4 MG/2ML injection 4 mg, 4 mg, Intravenous, Q6H PRN    prochlorperazine (Compazine) 10 MG/2ML injection 5 mg, 5 mg, Intravenous, Q8H PRN    umeclidinium bromide (Incruse Ellipta) 62.5 MCG/ACT inhaler 1 puff, 1 puff, Inhalation,  Daily    hydrALAzine (Apresoline) 20 mg/mL injection 10 mg, 10 mg, Intravenous, Q4H PRN    albuterol (Ventolin HFA) 108 (90 Base) MCG/ACT inhaler 2 puff, 2 puff, Inhalation, Q6H PRN

## 2025-04-21 NOTE — PROGRESS NOTES
Told in report Dr. Bernal and Dr. Coombs agreed to continuing Hemodyalisis through fistula after fistulagram. Received order for HD will order.

## 2025-04-22 ENCOUNTER — APPOINTMENT (OUTPATIENT)
Dept: INTERVENTIONAL RADIOLOGY/VASCULAR | Facility: HOSPITAL | Age: 45
End: 2025-04-22
Attending: INTERNAL MEDICINE
Payer: MEDICARE

## 2025-04-22 LAB
ALBUMIN SERPL-MCNC: 3.8 G/DL (ref 3.2–4.8)
ANION GAP SERPL CALC-SCNC: 15 MMOL/L (ref 0–18)
BUN BLD-MCNC: 71 MG/DL (ref 9–23)
CALCIUM BLD-MCNC: 8.5 MG/DL (ref 8.7–10.6)
CHLORIDE SERPL-SCNC: 103 MMOL/L (ref 98–112)
CO2 SERPL-SCNC: 24 MMOL/L (ref 21–32)
CREAT BLD-MCNC: 10.59 MG/DL (ref 0.7–1.3)
EGFRCR SERPLBLD CKD-EPI 2021: 6 ML/MIN/1.73M2 (ref 60–?)
ERYTHROCYTE [DISTWIDTH] IN BLOOD BY AUTOMATED COUNT: 13.1 %
GLUCOSE BLD-MCNC: 114 MG/DL (ref 70–99)
HCT VFR BLD AUTO: 22.9 % (ref 39–53)
HGB BLD-MCNC: 7.9 G/DL (ref 13–17.5)
MAGNESIUM SERPL-MCNC: 2.4 MG/DL (ref 1.6–2.6)
MCH RBC QN AUTO: 31.6 PG (ref 26–34)
MCHC RBC AUTO-ENTMCNC: 34.5 G/DL (ref 31–37)
MCV RBC AUTO: 91.6 FL (ref 80–100)
OSMOLALITY SERPL CALC.SUM OF ELEC: 316 MOSM/KG (ref 275–295)
PHOSPHATE SERPL-MCNC: 7 MG/DL (ref 2.4–5.1)
PLATELET # BLD AUTO: 173 10(3)UL (ref 150–450)
POTASSIUM SERPL-SCNC: 4.1 MMOL/L (ref 3.5–5.1)
RBC # BLD AUTO: 2.5 X10(6)UL (ref 4.3–5.7)
SODIUM SERPL-SCNC: 142 MMOL/L (ref 136–145)
TROPONIN I SERPL HS-MCNC: 282 NG/L (ref ?–53)
WBC # BLD AUTO: 5 X10(3) UL (ref 4–11)

## 2025-04-22 PROCEDURE — 93005 ELECTROCARDIOGRAM TRACING: CPT

## 2025-04-22 PROCEDURE — 36558 INSERT TUNNELED CV CATH: CPT | Performed by: STUDENT IN AN ORGANIZED HEALTH CARE EDUCATION/TRAINING PROGRAM

## 2025-04-22 PROCEDURE — 99152 MOD SED SAME PHYS/QHP 5/>YRS: CPT | Performed by: STUDENT IN AN ORGANIZED HEALTH CARE EDUCATION/TRAINING PROGRAM

## 2025-04-22 PROCEDURE — 83735 ASSAY OF MAGNESIUM: CPT | Performed by: INTERNAL MEDICINE

## 2025-04-22 PROCEDURE — 96375 TX/PRO/DX INJ NEW DRUG ADDON: CPT

## 2025-04-22 PROCEDURE — 80069 RENAL FUNCTION PANEL: CPT | Performed by: HOSPITALIST

## 2025-04-22 PROCEDURE — 84484 ASSAY OF TROPONIN QUANT: CPT | Performed by: INTERNAL MEDICINE

## 2025-04-22 PROCEDURE — 96372 THER/PROPH/DIAG INJ SC/IM: CPT

## 2025-04-22 PROCEDURE — 85027 COMPLETE CBC AUTOMATED: CPT | Performed by: INTERNAL MEDICINE

## 2025-04-22 PROCEDURE — 93010 ELECTROCARDIOGRAM REPORT: CPT | Performed by: INTERNAL MEDICINE

## 2025-04-22 PROCEDURE — 76937 US GUIDE VASCULAR ACCESS: CPT | Performed by: STUDENT IN AN ORGANIZED HEALTH CARE EDUCATION/TRAINING PROGRAM

## 2025-04-22 PROCEDURE — 90935 HEMODIALYSIS ONE EVALUATION: CPT | Performed by: INTERNAL MEDICINE

## 2025-04-22 PROCEDURE — 77001 FLUOROGUIDE FOR VEIN DEVICE: CPT | Performed by: STUDENT IN AN ORGANIZED HEALTH CARE EDUCATION/TRAINING PROGRAM

## 2025-04-22 RX ORDER — HEPARIN SODIUM 5000 [USP'U]/ML
INJECTION, SOLUTION INTRAVENOUS; SUBCUTANEOUS
Status: COMPLETED
Start: 2025-04-22 | End: 2025-04-22

## 2025-04-22 RX ORDER — HYDROMORPHONE HYDROCHLORIDE 1 MG/ML
0.2 INJECTION, SOLUTION INTRAMUSCULAR; INTRAVENOUS; SUBCUTANEOUS EVERY 2 HOUR PRN
Refills: 0 | Status: DISCONTINUED | OUTPATIENT
Start: 2025-04-22 | End: 2025-04-22

## 2025-04-22 RX ORDER — MIDAZOLAM HYDROCHLORIDE 1 MG/ML
INJECTION INTRAMUSCULAR; INTRAVENOUS
Status: COMPLETED
Start: 2025-04-22 | End: 2025-04-22

## 2025-04-22 RX ORDER — LIDOCAINE HYDROCHLORIDE AND EPINEPHRINE BITARTRATE 20; .01 MG/ML; MG/ML
INJECTION, SOLUTION SUBCUTANEOUS
Status: COMPLETED
Start: 2025-04-22 | End: 2025-04-22

## 2025-04-22 RX ORDER — HYDROMORPHONE HYDROCHLORIDE 1 MG/ML
0.1 INJECTION, SOLUTION INTRAMUSCULAR; INTRAVENOUS; SUBCUTANEOUS EVERY 2 HOUR PRN
Refills: 0 | Status: DISCONTINUED | OUTPATIENT
Start: 2025-04-22 | End: 2025-04-22

## 2025-04-22 RX ORDER — HYDROMORPHONE HYDROCHLORIDE 1 MG/ML
0.2 INJECTION, SOLUTION INTRAMUSCULAR; INTRAVENOUS; SUBCUTANEOUS EVERY 6 HOURS PRN
Refills: 0 | Status: DISCONTINUED | OUTPATIENT
Start: 2025-04-22 | End: 2025-04-24

## 2025-04-22 RX ORDER — HYDROMORPHONE HYDROCHLORIDE 1 MG/ML
0.4 INJECTION, SOLUTION INTRAMUSCULAR; INTRAVENOUS; SUBCUTANEOUS EVERY 6 HOURS PRN
Refills: 0 | Status: DISCONTINUED | OUTPATIENT
Start: 2025-04-22 | End: 2025-04-24

## 2025-04-22 RX ORDER — HEPARIN SODIUM 1000 [USP'U]/ML
1.5 INJECTION, SOLUTION INTRAVENOUS; SUBCUTANEOUS
Status: COMPLETED | OUTPATIENT
Start: 2025-04-22 | End: 2025-04-22

## 2025-04-22 RX ORDER — LIDOCAINE HYDROCHLORIDE 10 MG/ML
INJECTION, SOLUTION EPIDURAL; INFILTRATION; INTRACAUDAL; PERINEURAL
Status: COMPLETED
Start: 2025-04-22 | End: 2025-04-22

## 2025-04-22 RX ORDER — HYDROMORPHONE HYDROCHLORIDE 1 MG/ML
0.4 INJECTION, SOLUTION INTRAMUSCULAR; INTRAVENOUS; SUBCUTANEOUS EVERY 6 HOURS PRN
Refills: 0 | Status: DISCONTINUED | OUTPATIENT
Start: 2025-04-22 | End: 2025-04-22

## 2025-04-22 NOTE — PROCEDURES
[facility]   part of [facility]  Procedure Note    Godwin Fonseca Patient Status:  Observation    1980 MRN [id number]   Location [facility] 3NE-A Attending Yuriy Forrest MD   Hosp Day # 1 PCP Adrian Small MD     Procedure: Tunneled dialysis catheter placement    Pre-Procedure Diagnosis:  ESRD    Post-Procedure Diagnosis: Same    Anesthesia:  Local and Sedation    Findings:    Severely stenosed right internal jugular vein.  Successful placement of left chest tunneled dialysis catheter via patent internal jugular vein.  Catheter aspirates and flushes freely. Locked with Heparin.    Catheter is ready for use.      Specimens: None    Blood Loss:  <5ml    Tourniquet Time: 0  Complications:  None  Drains:  None    Secondary Diagnosis:  None      Estrella Grodillo MD  2025

## 2025-04-22 NOTE — PAYOR COMM NOTE
--------------  STATUS CHANGED TO OBSERVATION ON 4/21/25 PER ORDER BELOW        Order Requisition    Place in observation Once (Order #[id number]) on 4/21/25       ADMISSION REVIEW     Payor: [employer]  Subscriber #:  [id number]  Authorization Number: [id number]    Admit date: 4/20/25  Admit time:  4:44 AM       4/19 Patient Seen in: [facility] In [place]    History     Chief Complaint   Patient presents with    Abdomen/Flank Pain    Difficulty Breathing     Stated Complaint: shortness of breath, abdominal pain, problem with fistula    Subjective:   HPI    Patient is here concerned about persistent shortness of breath that been going on for 2 weeks.  States symptoms have been essentially stable since he left the hospital after admission for shortness of breath.  No fevers, no chills, no cough.  No chest pain.  No lightheadedness.  States he been compliant with his medications.  Has been going to dialysis in fact had a 4-hour session    Were dismount of fluid was taken off.  He was hoping to feel better but has until he wanted to come in.  Additionally is concerned about some swelling around his fistula.    There were no problems with dialysis yesterday otherwise.    Complains of persistent abdominal pain as well.  States he was recently diagnosed with diverticulitis.  Out of pain is on the left side.          History of Present Illness               Objective:     Past Medical History:    Anxiety state    Arrhythmia    Asthma ([facility])    Attention deficit hyperactivity disorder (ADHD)    Back problem    Bipolar 1 disorder ([facility])    CKD (chronic kidney disease) stage 3, GFR 30-59 ml/min ([facility])    Dr Meeks    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[facility])    Congestive heart disease (HCC)    COPD (chronic obstructive pulmonary disease) ([facility])    Coronary atherosclerosis    Deep vein thrombosis ([facility])    at age 17 R/T cast    Depression    Diabetes ([facility])    Dialysis patient    Diverticulosis of large  intestine    Essential hypertension    3/21 echo: severe concentric LVH with normal EF and no MR or pHTN    Extrinsic asthma, unspecified    Heart attack (HCC)    2016- angiogram- no intervention    Heart valve disease    mitral valve repair in 1994/    High blood pressure    High cholesterol    History of blood transfusion    History of mitral valve repair    Hyperlipidemia    Low back pain    tight and stiff after sweeping and mopping    LVH (left ventricular hypertrophy)    Migraines    Mixed hyperlipidemia     HDL 38 LDL 97 VLDL 57     Monoclonal gammopathy    IgG kappa     Muscle weakness    MVP (mitral valve prolapse)    Repair 1994 at [employer]; echoes as recently as 3/21 show mild or trivial MR and no stenosis    Neuropathy    Osteoarthritis    hip ,knees    Pneumonia due to organism    Pulmonary embolism (HCC)    Renal disorder    Stroke (HCC)    TIA (transient ischemic attack)    Initial history of left-sided weakness and slurred speech. (+) cocaine. MRI of the brain, CT angiogram of the head and neck, and 2D echo are all unremarkable.     TMJ (dislocation of temporomandibular joint)    Troponin level elevated    Trop 60 60 47 with TIA and no CP: Lexiscan negative with EF 51    Visual impairment              Past Surgical History:   Procedure Laterality Date    Av fistula revision, open Left     Cabg      Colonoscopy N/A 03/26/2023    Procedure: COLONOSCOPY;  Surgeon: Heath Vu MD;  Location:  ENDOSCOPY    Colonoscopy N/A 12/30/2023    Procedure: COLONOSCOPY with cold snare polypectomy and forcep polypectomy;  Surgeon: Ousmane Suarez MD;  Location:  ENDOSCOPY    Colonoscopy N/A 3/9/2025    Procedure: COLONOSCOPY WITH COLD SNARE POLYPECTOMY AND ENDO CLIPS X 2;  Surgeon: Bakari Noguera MD;  Location:  ENDOSCOPY    Colonoscopy & polypectomy  2019    Egd  2019    Duodenitis. Biopsied. EUS for weight loss was negative    Heart surgery      Hernia surgery  08/17/2022    Dr Barnes     Laminectomy,>2 sgmt,lumbar  09/06/2018    L4-L5 Decomp Discectomy ROEM L4-L5    Mitralplasty w cp bypass  1994    Christiano: Repair    Repair rotator cuff,chronic Left     torn and had a ruptured bicep    Sinus surgery        Spine surgery procedure unlisted      Valve repair  1994    mitral valve                Social History     Socioeconomic History    Marital status:    Tobacco Use    Smoking status: Former     Current packs/day: 0.00     Average packs/day: 1 pack/day for 27.0 years (27.0 ttl pk-yrs)     Types: Cigarettes     Start date: 2/28/1995     Quit date: 2/28/2022     Years since quitting: 3.1     Passive exposure: Never    Smokeless tobacco: Never   Vaping Use    Vaping status: Never Used   Substance and Sexual Activity    Alcohol use: No    Drug use: No     Social Drivers of Health     Food Insecurity: No Food Insecurity (3/29/2025)    NCSS - Food Insecurity     Worried About Running Out of Food in the Last Year: No     Ran Out of Food in the Last Year: No   Transportation Needs: No Transportation Needs (3/29/2025)    NCSS - Transportation     Lack of Transportation: No   Housing Stability: Not At Risk (3/29/2025)    NCSS - Housing/Utilities     Has Housing: Yes     Worried About Losing Housing: No     Unable to Get Utilities: No                                Physical Exam     ED Triage Vitals [04/19/25 2126]   BP (!) 191/115   Pulse 93   Resp 24   Temp 97.4 °F (36.3 °C)   Temp src Temporal   SpO2 100 %   O2 Device None (Room air)       Current Vitals:   Vital Signs  BP: (!) 158/111  Pulse: 89  Resp: 20  Temp: 97.4 °F (36.3 °C)  Temp src: Temporal    Oxygen Therapy  SpO2: 100 %  O2 Device: None (Room air)        Physical Exam    Constitutional: Awake, alert, age appearing, non-toxic  Head: Normocephalic and atraumatic.     Eyes: EOM are normal. Pupils are equal, round, and reactive to light.   Neck: Normal range of motion. Neck supple. No JVD present.     Cardiovascular: Normal rate and regular  rhythm.  Normal peripheral perfusion with good color.  Pulmonary/Chest: Normal effort.  No accessory muscle use.  No clubbing, no cyanosis.  Abdominal: Soft. There is no tenderness. There is no guarding.     Musculoskeletal: No swelling, deformity or ecchymosis.    Neurological: Pt is alert and oriented to person, place, and time. No cranial nerve deficit.     Skin: Skin is warm and dry.   Psychiatric: Normal mood and affect. Thought content normal.         Fistula in the left forearm has a palpable thrill audible bruit, the area of swelling he is pointing to is a small vessel that is a few centimeters proximal to the fistula.       No erythema induration no fluctuance      Extremities warm, perfused with palpable radial pulse.      Decreased breath sounds right base no crackles no wheezes    Physical Exam                ED Course     Labs Reviewed   CBC WITH DIFFERENTIAL WITH PLATELET - Abnormal; Notable for the following components:       Result Value    RBC 2.61 (*)     HGB 8.4 (*)     HCT 24.7 (*)     All other components within normal limits   COMP METABOLIC PANEL (14) - Abnormal; Notable for the following components:    Glucose 229 (*)     BUN 53 (*)     Creatinine 8.12 (*)     Calculated Osmolality 306 (*)     eGFR-Cr 8 (*)     AST 53 (*)     ALT 84 (*)     All other components within normal limits   TROPONIN I HIGH SENSITIVITY - Abnormal; Notable for the following components:    Troponin I (High Sensitivity) 271 (*)     All other components within normal limits   RAPID SARS-COV-2 BY PCR - Normal   POCT FLU TEST - Normal    Narrative:     This assay is a rapid molecular in vitro test utilizing nucleic acid amplification of influenza A and B viral RNA.   LIPID PANEL   PRO BETA NATRIURETIC PEPTIDE   RAINBOW DRAW LAVENDER   RAINBOW DRAW LIGHT GREEN   RAINBOW DRAW BLUE     EKG    Rate, intervals and axes as noted on EKG Report.  Rate: 94  Rhythm: Sinus Rhythm  Reading: Sinus rhythm with LVH not changed compared  to previous              Results            Blood reviewed, troponin is chronically elevated improved from more recent measurements.  Remainder blood work is at his baseline.        In reviewing prior records, his last hospitalization was on March 30 he was admitted for dialysis.                    MDM          Differential diagnoses considered: Heart failure, hypertensive urgency, tension emergency, fluid overload, pneumonia, diverticulitis all considered      -No acute process in the abdomen  - Vitals have been fine.  - CT scan shows chronic/persistent changes in the right lower chest    -Patient has been satting fine on room air and ambulated well but then developed shortness of breath and some chest pressure.    -At he states that he is not at his baseline and he endorses some PND and orthopnea.    -Does not look overtly overloaded but  may not be at his dry weight.    Discussed going home with close follow-up-but he feels like that this is not a good idea given his respiratory status.  As such we will meet him for observation.    Patient will be [facility] the [facility] hospitalist.    Care has been discussed with the admitting physician.    Nephrology to see the morning      I visualized the radiology studies, my independent interpretation: Persistent changes in the right lower chest noted on x-ray, pneumonia versus effusion          *Discussion of ongoing management of this patient's care included:  admitting physician  *Comorbidities contributing to the complexity of decision making:  ESRD on dialysis, heart failure  *External charts reviewed:  as noted above      Shared decision making was done by: patient, myself.      Admission disposition: 4/20/2025  2:16 AM           Medical Decision Making      Disposition and Plan     Clinical Impression:  1. RAMSAY (dyspnea on exertion)         Disposition:  Admit  4/20/2025  2:16 am    Follow-up:  No follow-up provider specified.        Medications  Prescribed:  Current Discharge Medication List          Supplementary Documentation:         Hospital Problems       Present on Admission  Date Reviewed: 3/30/2025          ICD-10-CM Noted POA    * (Principal) RAMSAY (dyspnea on exertion) R06.09 4/20/2025 Unknown                                                                       Signed by Stacy Shane MD on 4/20/2025  2:38 AM         MEDICATIONS ADMINISTERED IN LAST 1 DAY:  amphetamine-dextroamphetamine (Adderall) tab 10 mg       Date Action Dose Route User    4/22/2025 1148 Given 10 mg Oral Jennifer Mathew RN    4/22/2025 0835 Given 10 mg Oral Jennifer Mathew RN          ARIPiprazole (Abilify) tab 10 mg       Date Action Dose Route User    4/22/2025 0958 Given 10 mg Oral Jennifer Mathew RN          ARIPiprazole (Abilify) tab 5 mg       Date Action Dose Route User    4/22/2025 0958 Given 5 mg Oral Jennifer Mathew RN          atorvastatin (Lipitor) tab 20 mg       Date Action Dose Route User    4/21/2025 1732 Given 20 mg Oral Rula Elliott RN          buPROPion ER (Wellbutrin XL) 24 hr tab 150 mg       Date Action Dose Route User    4/22/2025 0959 Given 150 mg Oral Jennifer Mathew RN          calcium acetate (Phoslo) cap 667 mg       Date Action Dose Route User    4/22/2025 1148 Given 667 mg Oral Jennifer Mathew RN    4/22/2025 0835 Given 667 mg Oral Jennifer Mathew RN    4/21/2025 1737 Given 667 mg Oral Rula Elliott RN          carvedilol (Coreg) tab 25 mg       Date Action Dose Route User    4/22/2025 0835 Given 25 mg Oral Jennifer Mathew RN          cloNIDine (Catapres) tab 0.1 mg       Date Action Dose Route User    4/22/2025 0835 Given 0.1 mg Oral Jennifer Mathew RN    4/21/2025 2105 Given 0.1 mg Oral Dalia Gale RN          FLUoxetine (PROzac) cap 40 mg       Date Action Dose Route User    4/22/2025 0958 Given 40 mg Oral Jennifer Mathew RN          fluticasone propionate (Flonase) 50 MCG/ACT nasal suspension 2 spray       Date  Action Dose Route User    4/22/2025 1001 Given 2 spray Each Nare Jennifer Mathew RN          gabapentin (Neurontin) cap 200 mg       Date Action Dose Route User    4/22/2025 0835 Given 200 mg Oral Jennifer Mathew RN    4/21/2025 2105 Given 200 mg Oral Dalia Gale RN    4/21/2025 1732 Given 200 mg Oral Rula Elliott RN          heparin (Porcine) 5000 UNIT/ML injection 5,000 Units       Date Action Dose Route User    4/22/2025 0559 Given 5,000 Units Subcutaneous (Left Upper Abdomen) Dalia Gale RN    4/21/2025 2106 Given 5,000 Units Subcutaneous (Left Lower Abdomen) Dalia Gale RN          hydrALAZINE (Apresoline) tab 25 mg       Date Action Dose Route User    4/22/2025 0835 Given 25 mg Oral Jennifer Mathew RN    4/21/2025 2105 Given 25 mg Oral Dalia Gale RN          HYDROcodone-acetaminophen (Norco)  MG per tab 1 tablet       Date Action Dose Route User    4/22/2025 0959 Given 1 tablet Oral Jennifer Mathew RN          lamoTRIgine (LaMICtal) tab 100 mg       Date Action Dose Route User    4/22/2025 0959 Given 100 mg Oral Jennifer Mathew RN          losartan (Cozaar) tab 100 mg       Date Action Dose Route User    4/22/2025 0958 Given 100 mg Oral Jennifer Mathew RN          NIFEdipine ER (Procardia-XL) 24 hr tab 30 mg       Date Action Dose Route User    4/22/2025 0959 Given 30 mg Oral Jennifer Mathew RN          pantoprazole (Protonix) DR tab 40 mg       Date Action Dose Route User    4/22/2025 0559 Given 40 mg Oral Dalia Gale RN          sevelamer carbonate (Renvela) tab 2,400 mg       Date Action Dose Route User    4/22/2025 1148 Given 2,400 mg Oral Jennifer Mathew RN    4/22/2025 0834 Given 2,400 mg Oral Jennifer Mathew RN    4/21/2025 1731 Given 2,400 mg Oral [place], Rula, RN          tamsulosin (Flomax) cap 0.4 mg       Date Action Dose Route User    4/22/2025 0958 Given 0.4 mg Oral Jennifer Mathew, RN            Vitals (last day)       Date/Time Temp Pulse Resp BP SpO2 Weight  O2 Device O2 Flow Rate (L/min) [facility]    04/22/25 1235 -- 79 22 118/79 99 % -- Nasal cannula 3 L/min     04/22/25 0800 -- 86 25 147/97 100 % -- Nasal cannula 3 L/min     04/22/25 0400 98 °F (36.7 °C) 86 14 127/89 100 % -- None (Room air) --     04/22/25 0000 97.9 °F (36.6 °C) 92 13 121/74 100 % -- None (Room air) --     04/21/25 2000 98.4 °F (36.9 °C) 85 17 123/84 95 % -- None (Room air) --     04/21/25 1714 97 °F (36.1 °C) 82 15 124/85 94 % -- None (Room air) -- HA    04/21/25 1200 98 °F (36.7 °C) 79 23 128/88 97 % -- None (Room air) -- HA    04/21/25 0730 98 °F (36.7 °C) 82 19 143/92 98 % -- None (Room air) -- HA    04/21/25 0428 98.3 °F (36.8 °C) 85 19 128/82 98 % -- Nasal cannula 4 L/min MM    04/21/25 0012 -- 89 23 138/104 98 % -- Nasal cannula 4 L/min MM          CIWA Scores (since admission)       None

## 2025-04-22 NOTE — PLAN OF CARE
Assumed care at 0730.  A&O 4.  1 L oxygen applied PRN for comfort.   NPO upon assumption of care for permacath placement. Regular diet ordered upon pt arrival back to the floor.  Tele- NSR.  VTE- Heparin.  L jugular HD permacath placed this AM.  Dialysis ordered and completed. (4 L removed)  PRN pain meds utilized. See MAR for more details.   Cardiology consulted.   X1 EKG ordered and completed. See results for more details.  US venous doppler of the L arm ordered to evaluate L arm fistula.   L arm prec.   SBA to the bathroom.    Pt oriented to the room, safety prec initiated, bed is in the lowest position and call light within reach. Pt updated on the plan of care. All needs met at this time.    1843: MD paged per pt request regarding L chest pain where the permacath was placed that is radiating up the pts L side of his neck. PRN pain med utilized. See MAR for more details.     Problem: Patient/Family Goals  Goal: Patient/Family Long Term Goal  Description: Patient's Long Term Goal: discharge  Interventions: follow plan of care  - See additional Care Plan goals for specific interventions  Outcome: Progressing  Goal: Patient/Family Short Term Goal  Description: Patient's Short Term Goal: pain management, BP management  Interventions: PRN medications   - See additional Care Plan goals for specific interventions  Outcome: Progressing     Problem: PAIN - ADULT  Goal: Verbalizes/displays adequate comfort level or patient's stated pain goal  Description: INTERVENTIONS:- Encourage pt to monitor pain and request assistance- Assess pain using appropriate pain scale- Administer analgesics based on type and severity of pain and evaluate response- Implement non-pharmacological measures as appropriate and evaluate response- Consider cultural and social influences on pain and pain management- Manage/alleviate anxiety- Utilize distraction and/or relaxation techniques- Monitor for opioid side effects- Notify MD/LIP if  interventions unsuccessful or patient reports new pain- Anticipate increased pain with activity and pre-medicate as appropriate  Outcome: Progressing     Problem: CARDIOVASCULAR - ADULT  Goal: Maintains optimal cardiac output and hemodynamic stability  Description: INTERVENTIONS:- Monitor vital signs, rhythm, and trends- Monitor for bleeding, hypotension and signs of decreased cardiac output- Evaluate effectiveness of vasoactive medications to optimize hemodynamic stability- Monitor arterial and/or venous puncture sites for bleeding and/or hematoma- Assess quality of pulses, skin color and temperature- Assess for signs of decreased coronary artery perfusion - ex. Angina- Evaluate fluid balance, assess for edema, trend weights  Outcome: Progressing  Goal: Absence of cardiac arrhythmias or at baseline  Description: INTERVENTIONS:- Continuous cardiac monitoring, monitor vital signs, obtain 12 lead EKG if indicated- Evaluate effectiveness of antiarrhythmic and heart rate control medications as ordered- Initiate emergency measures for life threatening arrhythmias- Monitor electrolytes and administer replacement therapy as ordered  Outcome: Progressing     Problem: CARDIOVASCULAR - ADULT  Goal: Absence of cardiac arrhythmias or at baseline  Description: INTERVENTIONS:- Continuous cardiac monitoring, monitor vital signs, obtain 12 lead EKG if indicated- Evaluate effectiveness of antiarrhythmic and heart rate control medications as ordered- Initiate emergency measures for life threatening arrhythmias- Monitor electrolytes and administer replacement therapy as ordered  Outcome: Progressing

## 2025-04-22 NOTE — PLAN OF CARE
Assumed care of patient at 1930  A&Ox4, RA, NSR on tele  Denies pain  NPO at mn  Up ad osvaldo  IR for HD access planned for tomorrow  Safety precautions in place  All needs met at this time    Problem: Patient/Family Goals  Goal: Patient/Family Long Term Goal  Description: Patient's Long Term Goal: discharge  Interventions: follow plan of care  - See additional Care Plan goals for specific interventions  Outcome: Progressing  Goal: Patient/Family Short Term Goal  Description: Patient's Short Term Goal: pain management, BP management  Interventions: PRN medications   - See additional Care Plan goals for specific interventions  Outcome: Progressing     Problem: PAIN - ADULT  Goal: Verbalizes/displays adequate comfort level or patient's stated pain goal  Description: INTERVENTIONS:- Encourage pt to monitor pain and request assistance- Assess pain using appropriate pain scale- Administer analgesics based on type and severity of pain and evaluate response- Implement non-pharmacological measures as appropriate and evaluate response- Consider cultural and social influences on pain and pain management- Manage/alleviate anxiety- Utilize distraction and/or relaxation techniques- Monitor for opioid side effects- Notify MD/LIP if interventions unsuccessful or patient reports new pain- Anticipate increased pain with activity and pre-medicate as appropriate  Outcome: Progressing     Problem: CARDIOVASCULAR - ADULT  Goal: Maintains optimal cardiac output and hemodynamic stability  Description: INTERVENTIONS:- Monitor vital signs, rhythm, and trends- Monitor for bleeding, hypotension and signs of decreased cardiac output- Evaluate effectiveness of vasoactive medications to optimize hemodynamic stability- Monitor arterial and/or venous puncture sites for bleeding and/or hematoma- Assess quality of pulses, skin color and temperature- Assess for signs of decreased coronary artery perfusion - ex. Angina- Evaluate fluid balance, assess for  edema, trend weights  Outcome: Progressing  Goal: Absence of cardiac arrhythmias or at baseline  Description: INTERVENTIONS:- Continuous cardiac monitoring, monitor vital signs, obtain 12 lead EKG if indicated- Evaluate effectiveness of antiarrhythmic and heart rate control medications as ordered- Initiate emergency measures for life threatening arrhythmias- Monitor electrolytes and administer replacement therapy as ordered  Outcome: Progressing

## 2025-04-22 NOTE — CM/SW NOTE
04/22/25 1100   CM/SEFERINO Referral Data   Referral Source Nurse   Informant EMR;Clinical Staff Member   Patient Status Prior to Admission   Services in place prior to admission Dialysis;Home Health Care   Home Health Provider Info Health system   Dialysis Clinic [facility]   Scheduled Dialysis days M-W-F   Discharge Needs   Anticipated D/C needs Home health care;Outpatient dialysis     SW received order for Home Health Services. Pt is a 44 y/o male admitted with RAMSAY. Per chart review, pt has HD at [facility] and is current with Health system.     Pt discussed in rounds and per RN, pt had a permcath placed today for HD. Per RN, pt was previously using a fistula.     SW sent referral to Health system in [place]. SEFERINO received message from [facility] confirming pt is current with the agency. MODESTA order attached to referral.     SEFERINO faxed permacath report to [facility] at fax: [phone]. SEFERINO/SEBASTIAN to remain available for support and/or discharge planning.    JOSH Hammond  Discharge Planner

## 2025-04-22 NOTE — INTERVAL H&P NOTE
The above referenced H&P was reviewed by Estrella Gordillo MD on 4/22/2025, the patient was examined and no significant changes have occurred in the patient's condition since the H&P was performed.  Risks, benefits, alternative treatments and consequences of no treatment were discussed.  We will proceed with procedure as planned.      Estrella Gordillo MD  4/22/2025  10:11 AM

## 2025-04-22 NOTE — H&P
[facility] Cardiology  Consultation Note      Godwin Fonseca Patient Status:  Observation    1980 MRN [id number]   Location [facility] 3NE-A Attending Yuriy Forrest MD   Hosp Day # 1 PCP Adrian Small MD     Impression:  1. long-standing HTN complicated by ESRD on HD--> unable to dialyze through AV fistula, uncontrolled BP as a result--> now s/p perm cath placement 25.    2. chronically elevated troponin, in setting of ESRD/HTN, usually in 200-500s range--> 282, ECG SR with non-spec repolarization changes    3. hx MV repair--> TTE (3/25): LVEF 60-65%, s/p MV repair- 6mmHg gradient, mod MR    4. chronic anemia, likely AoCD/ESRD    Recommendations:  - chronic persistent CP/SOB and chronically elevated HS troponin in setting of uncontrolled BP, missed HD.  BP improved post HD today.  Reviewed prior [facility]  (similar symptoms/cardiac enzymes)- no significant CAD.  CAD related ischemic disease unlikely- stress testing/LHC likely low yield.  Will defer.        History of Present Illness:  Godwin Fonseca is a 45 year old male who presented to [facility] on 2025.    Seen in cardiology consultation for troponin elevation.  Hx of ESRD 2/2 HTN on HD; unable to dialyze through fistula- IR fistulagram shows only mild narrowing at elbow. Unfortunately, still not able to dialyze through fistula, now s/p perm cath line placement. BP has been elevated and pt has c/o SOB. ECG shows SR with non-specific repolarization changes, HS trop 282 (usually in the 2-500 range).     Medications:  Current Hospital Medications[1]    Past Medical History[2]    Past Surgical History[3]    Family History  family history includes Alcohol and Other Disorders Associated in his father and maternal grandfather; Anxiety in his maternal aunt and maternal aunt; Bipolar Disorder in his maternal aunt; Cancer in his father, maternal grandfather, maternal uncle, mother, paternal aunt, and sister; Dementia in his father;  Depression in his maternal aunt and maternal aunt; Diabetes in his maternal grandfather, maternal grandmother, and mother; Hypertension in his father, maternal grandfather, maternal grandmother, mother, paternal grandfather, paternal grandmother, and sister; Substance Abuse in his father.    Social History   reports that he quit smoking about 3 years ago. His smoking use included cigarettes. He started smoking about 30 years ago. He has a 27 pack-year smoking history. He has never been exposed to tobacco smoke. He has never used smokeless tobacco. He reports that he does not drink alcohol and does not use drugs.     Allergies  Allergies[4]      Review of Systems:  Constitutional: negative for fevers  Eyes: negative for visual disturbance  Ears, nose, mouth, throat, and face: negative for epistaxis  Respiratory: negative for dyspnea on exertion  Cardiovascular: negative for chest pain  Gastrointestinal: negative for melena  Genitourinary:negative for hematuria  Hematologic/lymphatic: negative for bleeding  Musculoskeletal:negative for myalgias  Neurological: negative for dizziness and headaches  Endocrine: negative for temperature intolerance      Physical Exam:  Blood pressure 118/79, pulse 79, temperature 98 °F (36.7 °C), temperature source Oral, resp. rate 22, height 6' 2\" (1.88 m), weight 229 lb 14.4 oz (104.3 kg), SpO2 99%.  Temp (24hrs), Av.8 °F (36.6 °C), Min:97 °F (36.1 °C), Max:98.4 °F (36.9 °C)    Wt Readings from Last 3 Encounters:   25 229 lb 14.4 oz (104.3 kg)   25 240 lb (108.9 kg)   25 247 lb 2.2 oz (112.1 kg)       General: Awake and alert; in no acute distress  HEENT: Extraocular movements are intact; sclerae are anicteric; scalp is atrauamatic; no thyromegaly  Neck: Supple; no JVD; no carotid bruits  Cardiac: Regular rate and regular rhythm; no murmurs/rubs/gallops are appreciated; PMI is non-displaced; there is no evidence of a sternal heave  Lungs: Clear to auscultation  bilaterally; no accessory muscle use is noted  Abdomen: Soft, non-tender; bowel sounds are normoactive; no hepatosplenomegaly  Extremities: No clubbing or cyanosis; moves all 4 extremities normally  Psychiatric: Normal mood and affect; answers questions appropriately  Dermatologic: No rashes; normal skin turgor    Diagnostic testing:    EKG: Normal sinus rhythm    Labs:   Lab Results   Component Value Date    PT 13.9 12/14/2012    INR 1.08 03/29/2025    INR 1.07 03/08/2025        Lab Results   Component Value Date    WBC 5.0 04/22/2025    HGB 7.9 04/22/2025    HCT 22.9 04/22/2025    .0 04/22/2025    CREATSERUM 10.59 04/22/2025    BUN 71 04/22/2025     04/22/2025    K 4.1 04/22/2025     04/22/2025    CO2 24.0 04/22/2025     04/22/2025    CA 8.5 04/22/2025    ALB 3.8 04/22/2025    MG 2.4 04/22/2025    PHOS 7.0 04/22/2025         Thank you for allowing our practice to participate in the care of your patient. Please do not hesitate to contact me if you have any questions.             [1]   Current Facility-Administered Medications   Medication Dose Route Frequency    acetaminophen (Tylenol Extra Strength) tab 500 mg  500 mg Oral Q4H PRN    traMADol (Ultram) tab 50 mg  50 mg Oral Q12H PRN    ARIPiprazole (Abilify) tab 10 mg  10 mg Oral Daily    ARIPiprazole (Abilify) tab 5 mg  5 mg Oral Daily    atorvastatin (Lipitor) tab 20 mg  20 mg Oral QPM    buPROPion ER (Wellbutrin XL) 24 hr tab 150 mg  150 mg Oral Daily    calcium acetate (Phoslo) cap 667 mg  667 mg Oral TID with meals    carvedilol (Coreg) tab 25 mg  25 mg Oral BID with meals    cloNIDine (Catapres) tab 0.1 mg  0.1 mg Oral BID    FLUoxetine (PROzac) cap 40 mg  40 mg Oral Daily    gabapentin (Neurontin) cap 200 mg  200 mg Oral TID    hydrALAZINE (Apresoline) tab 25 mg  25 mg Oral TID    lamoTRIgine (LaMICtal) tab 100 mg  100 mg Oral Daily    losartan (Cozaar) tab 100 mg  100 mg Oral Daily    NIFEdipine ER (Procardia-XL) 24 hr tab 30 mg   30 mg Oral Daily    pantoprazole (Protonix) DR tab 40 mg  40 mg Oral QAM AC    sevelamer carbonate (Renvela) tab 2,400 mg  2,400 mg Oral TID CC    tamsulosin (Flomax) cap 0.4 mg  0.4 mg Oral Daily    amphetamine-dextroamphetamine (Adderall) tab 10 mg  10 mg Oral BID@0800,1200    HYDROcodone-acetaminophen (Norco)  MG per tab 1 tablet  1 tablet Oral Q6H PRN    cyclobenzaprine (Flexeril) tab 5 mg  5 mg Oral TID PRN    fluticasone propionate (Flonase) 50 MCG/ACT nasal suspension 2 spray  2 spray Each Nare Daily    heparin (Porcine) 5000 UNIT/ML injection 5,000 Units  5,000 Units Subcutaneous Q8H MARTHA    polyethylene glycol (PEG 3350) (Miralax) 17 g oral packet 17 g  17 g Oral Daily PRN    sennosides (Senokot) tab 17.2 mg  17.2 mg Oral Nightly PRN    bisacodyl (Dulcolax) 10 MG rectal suppository 10 mg  10 mg Rectal Daily PRN    ondansetron (Zofran) 4 MG/2ML injection 4 mg  4 mg Intravenous Q6H PRN    prochlorperazine (Compazine) 10 MG/2ML injection 5 mg  5 mg Intravenous Q8H PRN    umeclidinium bromide (Incruse Ellipta) 62.5 MCG/ACT inhaler 1 puff  1 puff Inhalation Daily    hydrALAzine (Apresoline) 20 mg/mL injection 10 mg  10 mg Intravenous Q4H PRN    albuterol (Ventolin HFA) 108 (90 Base) MCG/ACT inhaler 2 puff  2 puff Inhalation Q6H PRN   [2]   Past Medical History:   Anxiety state    Arrhythmia    Asthma (HCC)    Attention deficit hyperactivity disorder (ADHD)    Back problem    Bipolar 1 disorder ([facility])    CKD (chronic kidney disease) stage 3, GFR 30-59 ml/min ([facility])    Dr Meeks    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[facility])    Congestive heart disease (HCC)    COPD (chronic obstructive pulmonary disease) ([facility])    Coronary atherosclerosis    Deep vein thrombosis ([facility])    at age 17 R/T cast    Depression    Diabetes ([facility])    Dialysis patient    Diverticulosis of large intestine    Essential hypertension    3/21 echo: severe concentric LVH with normal EF and no MR or pHTN    Extrinsic asthma, unspecified    Heart attack  (HCC)    2016- angiogram- no intervention    Heart valve disease    mitral valve repair in 1994/    High blood pressure    High cholesterol    History of blood transfusion    History of mitral valve repair    Hyperlipidemia    Low back pain    tight and stiff after sweeping and mopping    LVH (left ventricular hypertrophy)    Migraines    Mixed hyperlipidemia     HDL 38 LDL 97 VLDL 57     Monoclonal gammopathy    IgG kappa     Muscle weakness    MVP (mitral valve prolapse)    Repair 1994 at [place]; echoes as recently as 3/21 show mild or trivial MR and no stenosis    Neuropathy    Osteoarthritis    hip ,knees    Pneumonia due to organism    Pulmonary embolism (HCC)    Renal disorder    Stroke (HCC)    TIA (transient ischemic attack)    Initial history of left-sided weakness and slurred speech. (+) cocaine. MRI of the brain, CT angiogram of the head and neck, and 2D echo are all unremarkable.     TMJ (dislocation of temporomandibular joint)    Troponin level elevated    Trop 60 60 47 with TIA and no CP: Lexiscan negative with EF 51    Visual impairment   [3]   Past Surgical History:  Procedure Laterality Date    Av fistula revision, open Left     Cabg      Colonoscopy N/A 03/26/2023    Procedure: COLONOSCOPY;  Surgeon: Heath Vu MD;  Location:  ENDOSCOPY    Colonoscopy N/A 12/30/2023    Procedure: COLONOSCOPY with cold snare polypectomy and forcep polypectomy;  Surgeon: Ousmane Suarez MD;  Location:  ENDOSCOPY    Colonoscopy N/A 3/9/2025    Procedure: COLONOSCOPY WITH COLD SNARE POLYPECTOMY AND ENDO CLIPS X 2;  Surgeon: Bakari Noguera MD;  Location:  ENDOSCOPY    Colonoscopy & polypectomy  2019    Egd  2019    Duodenitis. Biopsied. EUS for weight loss was negative    Heart surgery      Hernia surgery  08/17/2022    Dr Barnes    Laminectomy,>2 sgmt,lumbar  09/06/2018    L4-L5 Decomp Discectomy ROEM L4-L5    Mitralplasty w cp bypass  1994    [place]: Repair    Repair rotator cuff,chronic  Left     torn and had a ruptured bicep    Sinus surgery        Spine surgery procedure unlisted      Valve repair  1994    mitral valve   [4]   Allergies  Allergen Reactions    Hydrochlorothiazide RASH and HIVES

## 2025-04-22 NOTE — PROGRESS NOTES
[facility]   part of [facility]    Nephrology Progress Note    Godwin Fonseca Attending:  Yuriy Forrest MD     Cc: ESRD    SUBJECTIVE     Sp fistulagram 25 w IR. Narrowing noted, but no intervention able to take place. Per notes/RN IR d/w vascular.     Attempted HD last night but was unable to start HD. Per RN had clots coming from arterial but venous was ok.     Perm cath placed this morning  Pt still notes pain and swelling on LUE in forearm area, worst above the venous per pt    PHYSICAL EXAM   Vital signs: BP (!) 147/97 (BP Location: Right arm)   Pulse 86   Temp 98 °F (36.7 °C) (Oral)   Resp 25   Ht 6' 2\" (1.88 m)   Wt 229 lb 14.4 oz (104.3 kg)   SpO2 100%   BMI 29.52 kg/m²   Temp (24hrs), Av.8 °F (36.6 °C), Min:97 °F (36.1 °C), Max:98.4 °F (36.9 °C)       Intake/Output Summary (Last 24 hours) at 2025 1227  Last data filed at 2025 0958  Gross per 24 hour   Intake 840 ml   Output --   Net 840 ml     Wt Readings from Last 3 Encounters:   25 229 lb 14.4 oz (104.3 kg)   25 240 lb (108.9 kg)   25 247 lb 2.2 oz (112.1 kg)     General: NAD  HEENT: NCAT, EOMI, MMM  Neck: Supple   Cardiac: Regular rate and rhythm   Lungs: CTAB  Abdomen: Soft, non-tender, nondistended   Extremities: No LE edema  Neurologic: No asterxis  Skin: Warm and dry, no rashes     MEDS   Current Hospital Medications[1]    LABS     Lab Results   Component Value Date    WBC 5.0 2025    HGB 7.9 2025    HCT 22.9 2025    .0 2025    CREATSERUM 10.59 2025    BUN 71 2025     2025    K 4.1 2025     2025    CO2 24.0 2025     2025    CA 8.5 2025    ALB 3.8 2025    MG 2.4 2025    PHOS 7.0 2025       IMAGING   All imaging studies personally reviewed.    IR CENTRAL VENOUS ACCESS   Final Result                     =====   PROCEDURE:  IR PERMANENT CATHETER INSERTION EXCHNGE CHECK        INDICATIONS:  ESRD       TECHNIQUE:        Prior to the procedure, I discussed with the patient and/or legal    representative the potential benefits, risks, and side effects of this    procedure, the likelihood of the patient achieving goals; and the    potential problems that might occur during    recuperation.  I discussed reasonable alternatives to the procedure,    including risks, benefits, steps to prevent infection and side effects    related to the alternatives, and risks related to not receiving this    procedure. A witnessed verbal and signed    consent was obtained and documented in the patient's chart.        IV was checked and maintained by the nurse. Moderate conscious sedation    was performed under continuous pulse oximetry and cardiac monitoring under    my direct supervision.  The radiology nurse was in attendance throughout    the exam. Moderate conscious    sedation of 2 mg Versed and 100 mcg of Fentanyl was given.  Patient was    assessed and monitoring of oxygen saturation, heart rate, and blood    pressure by the nursing staff and myself during the exam for a total    intraservice time of 12 minutes of conscious    sedation time from 915 to 927.  Two grams of Ancef were given    pre-procedurally via existing IV.       The patient was placed supine on the angiographic table and the left chest    and neck was prepped and covered with a full body drape in the usual    sterile fashion.  All operators present for the case performed standard    pre-procedural prep including hand    washing, sterile gloves, gown, mask, and cap.  All aspects of the maximum    sterile barrier technique were followed.       A preprocedural time out was performed with all physicians, technologists,    and nurses involved with the procedure.   Ultrasound of the right neck demonstrated a severely stenotic right    internal jugular vein.   Sonography of the left  neck demonstrated a patent internal jugular vein    and a  permanent image was obtained.  Under sonographic guidance, the left      internal jugular vein was accessed using a micropuncture system.  A    guidewire was advanced into the IVC    under fluoroscopic guidance.  Using blunt dissection a dual lumen tunneled    catheter was placed via a peel-away sheath.  Both lumens flushed and    returned easily.  The catheter was secured and heparinized.  A small    amount of Dermabond was placed at the    neck venotomy site.   Sterile gauze and transparent dressing was applied.       The patient tolerated the procedure well and there were no immediate    complications. Routine post tunneled catheter insertion instructions were    communicated to the patient.       ESTIMATED BLOOD LOSS: Less than 5cc.       FLUORO TIME/DOSE:  0.4 minutes       AIR KERMA:  5.84 mGy       IMPRESSION: Successful placement of left internal jugular vein tunneled    dialysis catheter ( 27 cm tip to cuff Bard HemoSplit catheter)           LOCATION:  [facility]               Dictated by (CST): Duy De La Rosa MD on 4/22/2025 at 10:16 AM        Finalized by (CST): Duy De La Rosa MD on 4/22/2025 at 10:18 AM         IR GRAFT PROCEDURE   Final Result   PROCEDURE:  IR AV GRAFT DECLOT CHECK       INDICATIONS:  Left arm swelling       TECHNIQUE:    Prior to the procedure, I discussed with the patient and/or legal    representative the potential benefits, risks, and side effects of this    procedure, the likelihood of the patient achieving goals; and the    potential problems that might occur during    recuperation.  I discussed reasonable alternatives to the procedure,    including risks, benefits, steps to prevent infection and side effects    related to the alternatives, and risks related to not receiving this    procedure. A witnessed verbal and signed    consent was obtained and documented in the patient's chart.        IV was checked and maintained by the nurse. Moderate conscious sedation    was performed under  continuous pulse oximetry and cardiac monitoring under    my direct supervision.  The radiology nurse was in attendance throughout    the exam. Moderate conscious    sedation of 1 mg Versed and 50 mcg of Fentanyl was given.  Patient was    assessed and monitoring of oxygen saturation, heart rate, and blood    pressure by the nursing staff and myself during the exam for a total    intraservice time of 28 minutes of conscious    sedation time from 12:26 p.m. to 12:54 p.m..        The patient was placed supine on the angiographic table and the left arm    was prepped and covered with a full body drape in the usual sterile    fashion.  All operators present for the case performed standard    pre-procedural prep including hand washing,    sterile gloves, gown, mask, and cap.  All aspects of the maximum sterile    barrier technique were followed.       A preprocedural time out was performed with all physicians, technologists,    and nurses involved with the procedure.       The shunt was accessed with a micropuncture system pointing toward the    venous limb.  Shuntogram and central venograms were obtained.       SHUNTOGRAM:  The shunt was patent.  The axillary, subclavian, innominate    vein and SVC were patent. There was a stenosis within the venous outflow    at the level of the elbow.       Due to tortuosity proximal to the area of narrowing, a balloon catheter    cannot be tracked over the wire to this location.           Catheters were removed and hemostasis was achieved with manual    compression.  The patient tolerated the procedure well without any    immediate procedural complication.       CONTRAST:  50 mL of Isovue-300       ESTIMATED BLOOD LOSS: Less than 5cc.       FLUOROSCOPY TIME/DOSE: 5.3       AIR KERMA: 17.91 mGy                         =====   CONCLUSION:    1. There is a narrowing within the venous outflow at the level the elbow.     Due to tortuosity proximal to this narrowing, a balloon catheter  cannot be    advanced into the location in order to angioplasty.  There is good flow    through the fistula noted as    there are numerous collaterals proximal to this narrowing.   2. After discussion with Dr. Coombs, an attempt for dialysis will be made    through the fistula and if unsuccessful a tunneled dialysis catheter will    be placed.         PLAN:  Patient will follow-up with nephrology and at dialysis.       LOCATION:  [facility]                        Dictated by (CST): Quinn Bernal MD on 4/21/2025 at 12:59 PM        Finalized by (CST): Quinn Bernal MD on 4/21/2025 at 1:15 PM         CT CHEST+ABDOMEN+PELVIS(CPT=71250/99589)   Final Result   PROCEDURE:  CT CHEST+ABDOMEN+PELVIS(CPT=71250/64995)       COMPARISON:  [facility], CT, CTA CHEST + CT ABD (W) + CT PEL (W)    SH(CPT=71275/63200), 3/29/2025, 4:34 PM.       INDICATIONS:  abnl cxr, SOB, llq pain       TECHNIQUE:  Following oral contrast administration, unenhanced multislice    CT scanning is performed through the chest, abdomen, and pelvis.  Dose    reduction techniques were used. Dose information is transmitted to the ACR    (American College of Radiology)    NRDR (National Radiology Data Registry) which includes the Dose Index    Registry.       PATIENT STATED HISTORY: (As transcribed by Technologist)  Patient reports    shortness of breath, lower left abdominal pain and generalized weakness.             FINDINGS:         CHEST:     Left atrial enlargement.  No pericardial effusion.  Mitral valve    prosthesis noted.  Mild coronary calcification.  Normal caliber of the    thoracic aorta and pulmonary trunk.  Enlarged lymph nodes of the    paratracheal station are stable, index right lower    paratracheal node measuring 1.6 cm (series 2, image 45).  Normal thyroid    and esophagus.  No central airway stenosis.       Stable right lung volume loss with areas of partial atelectasis or scar of    the right middle and right lower lobes.  Upper lobe  predominant emphysema.     Mild mosaic attenuation suggesting mild air trapping.  No acute airspace    disease.  No new or enlarging    pulmonary nodules.  Small right pleural effusion, similar to prior.  No    pneumothorax.       Regional soft tissues are within normal limits.  No fracture or aggressive    osseous lesion.  Suture anchors of the left humeral head noted.       ABDOMEN/PELVIS:   Normal liver morphology.  Parenchymal hyperdensity of the liver may    reflect sequela of amiodarone therapy.  Normal gallbladder, bile ducts,    spleen, pancreas, and adrenal glands.  Bilateral renal cortical atrophy.     Subtle hyperdensity of the right distal    ureter near the UVJ (series 2, image 234, 235) may represent small    ureteral calculi, though there is no evidence of collecting system    obstruction.       Normal stomach.  No evidence of bowel inflammation or obstruction.  Normal    appendix.  Pancolonic diverticulosis without evidence of acute    diverticulitis.       Concentric bladder wall thickening, likely accentuated by under    distention.  Normal prostate and seminal vesicles.       No ascites, pneumoperitoneum, or regional lymphadenopathy.       No abdominal aortic aneurysm.       Regional soft tissues are within normal limits.  Osteoarthritis of the    hips and spine.  No aggressive osseous lesions.                         =====   CONCLUSION:         1. No acute process identified within the chest, abdomen, or pelvis.       2. Scarring and volume loss of the right lung, similar compared to    03/29/2025.  Stable small right pleural effusion.  Stable mediastinal    lymphadenopathy.       3. Punctate hyperdensity of the right distal ureter near the UVJ may    reflect urolithiasis, though without evidence of collecting system    obstruction.       4. Pancolonic diverticulosis without evidence of acute diverticulitis.                     LOCATION:  [facility]               Dictated by (CST): Claude Singh MD on  4/20/2025 at 0:37 AM        Finalized by (CST): Claude Singh MD on 4/20/2025 at 0:46 AM         XR CHEST AP PORTABLE  (CPT=71045)   Final Result   PROCEDURE:  XR CHEST AP PORTABLE  (CPT=71045)       TECHNIQUE:  AP chest radiograph was obtained.       COMPARISON:  [facility], XR, XR CHEST PA + LAT CHEST (UJI=97990),    4/04/2025, 5:25 PM.       INDICATIONS:  shortness of breath, abdominal pain, problem with fistula       PATIENT STATED HISTORY: (As transcribed by Technologist)  Patient has    shortness of breath, dry cough, and chest tightness for around 2 weeks.    Patient stated tonight he started having left sided abdomen pain.             FINDINGS:                           =====   CONCLUSION:  Stable cardiac and mediastinal contours with valvular    prosthesis noted.  There continues to be confluent opacification of the    right lower lung which likely represents some combination of atelectasis,    airspace disease, and pleural effusion.     No appreciable pneumothorax.           LOCATION:  [facility]                       Dictated by (CST): Claude Singh MD on 4/19/2025 at 11:02 PM        Finalized by (CST): Claude Singh MD on 4/19/2025 at 11:03 PM               ASSESSMENT & PLAN   45 year old male with PMH of ESRD on iHD MWF, HTN, MR s/p MVR, HFpEF, TIA, DVT/PE, MGUS, presents with RAMSAY:     ESRD on HD MWF:  -- [facility] in [place]  -- last HD Friday    AVF malfunction  -- IR fistulagram 4/21/25 noted Mild narrowing at the level of the elbow within the venous outflow. No intervention   -- attempted HD, unable to dialyze, 2 RNs attempted. Pt w pain and swelling. Vascular surgery consulted, await recs    LUE erythema  -- around fistula site      Anemia in ESRD:  -- below goal will obtain [employer]     MBD / Hyperphosphatemia:  -- Sevelamer 2400mg PO TID  -- Calcium Acetate 667 TID     HTN:  -- Resume Home medications     RAMSAY:  -- Does not appear to be volume mediated at this time.        plan:   - HD today via perm cath,  UF 3-4L, HD directly managed, 3K  - vascular surgery consult appreciated, may need LUE ultrasound, await recs.   - avoid hypotension  - renally adjust medications  - ok to use Iodinated contrast  - avoid morphine w ESRD     D/w Dr Forrest    Thank you for allowing me to participate in the care of this patient. Please do not hesitate to call with questions or concerns.       Samaria Nava MD  [facility] Nephrology         [1]   Current Facility-Administered Medications   Medication Dose Route Frequency    acetaminophen (Tylenol Extra Strength) tab 500 mg  500 mg Oral Q4H PRN    traMADol (Ultram) tab 50 mg  50 mg Oral Q12H PRN    ARIPiprazole (Abilify) tab 10 mg  10 mg Oral Daily    ARIPiprazole (Abilify) tab 5 mg  5 mg Oral Daily    atorvastatin (Lipitor) tab 20 mg  20 mg Oral QPM    buPROPion ER (Wellbutrin XL) 24 hr tab 150 mg  150 mg Oral Daily    calcium acetate (Phoslo) cap 667 mg  667 mg Oral TID with meals    carvedilol (Coreg) tab 25 mg  25 mg Oral BID with meals    cloNIDine (Catapres) tab 0.1 mg  0.1 mg Oral BID    FLUoxetine (PROzac) cap 40 mg  40 mg Oral Daily    gabapentin (Neurontin) cap 200 mg  200 mg Oral TID    hydrALAZINE (Apresoline) tab 25 mg  25 mg Oral TID    lamoTRIgine (LaMICtal) tab 100 mg  100 mg Oral Daily    losartan (Cozaar) tab 100 mg  100 mg Oral Daily    NIFEdipine ER (Procardia-XL) 24 hr tab 30 mg  30 mg Oral Daily    pantoprazole (Protonix) DR tab 40 mg  40 mg Oral QAM AC    sevelamer carbonate (Renvela) tab 2,400 mg  2,400 mg Oral TID CC    tamsulosin (Flomax) cap 0.4 mg  0.4 mg Oral Daily    amphetamine-dextroamphetamine (Adderall) tab 10 mg  10 mg Oral BID@0800,1200    HYDROcodone-acetaminophen (Norco)  MG per tab 1 tablet  1 tablet Oral Q6H PRN    cyclobenzaprine (Flexeril) tab 5 mg  5 mg Oral TID PRN    fluticasone propionate (Flonase) 50 MCG/ACT nasal suspension 2 spray  2 spray Each Nare Daily    heparin (Porcine) 5000 UNIT/ML injection 5,000 Units  5,000  Units Subcutaneous Q8H MARTHA    polyethylene glycol (PEG 3350) (Miralax) 17 g oral packet 17 g  17 g Oral Daily PRN    sennosides (Senokot) tab 17.2 mg  17.2 mg Oral Nightly PRN    bisacodyl (Dulcolax) 10 MG rectal suppository 10 mg  10 mg Rectal Daily PRN    ondansetron (Zofran) 4 MG/2ML injection 4 mg  4 mg Intravenous Q6H PRN    prochlorperazine (Compazine) 10 MG/2ML injection 5 mg  5 mg Intravenous Q8H PRN    umeclidinium bromide (Incruse Ellipta) 62.5 MCG/ACT inhaler 1 puff  1 puff Inhalation Daily    hydrALAzine (Apresoline) 20 mg/mL injection 10 mg  10 mg Intravenous Q4H PRN    albuterol (Ventolin HFA) 108 (90 Base) MCG/ACT inhaler 2 puff  2 puff Inhalation Q6H PRN

## 2025-04-22 NOTE — PROGRESS NOTES
[facility] Hospitalist Progress Note     PCP: Adrian Small MD    Chief Complaint: follow-up   Follow up for: The encounter diagnosis was RAMSAY (dyspnea on exertion).    Overnight/Interim Events:      SUBJECTIVE:  Pain in LUE/fistula site. When asked, No cp, some tightness, feels like someone sitting on him. asking about IV pain meds, saying this making somewhat loopy.       OBJECTIVE:  Temp:  [97 °F (36.1 °C)-98.4 °F (36.9 °C)] 98 °F (36.7 °C)  Pulse:  [82-92] 86  Resp:  [13-25] 25  BP: (121-147)/(74-97) 147/97  SpO2:  [94 %-100 %] 100 %    Intake/Output:    Intake/Output Summary (Last 24 hours) at 4/22/2025 1208  Last data filed at 4/22/2025 0958  Gross per 24 hour   Intake 840 ml   Output --   Net 840 ml       Last 3 Weights   04/20/25 0452 229 lb 14.4 oz (104.3 kg)   04/19/25 2126 240 lb (108.9 kg)   04/05/25 1044 240 lb (108.9 kg)   04/04/25 1532 247 lb 2.2 oz (112.1 kg)       Exam    Throat: Lips, mucosa, and tongue normal.     Neck: Supple, symmetrical, trachea midline,   Lungs:   Clear to auscultation bilaterally. Normal effort   Chest wall:  No tenderness or deformity.   Heart:  Regular rate and rhythm, S1, S2 normal,    Abdomen:   Soft, non-tender. Bowel sounds normal.  Non distended   Extremities: Extremities normal, atraumatic, left arm with fistula, with slightly more swelling than the right, no erythema, drainage appreciated, no warmth or signs of infection noted externally.  Patient endorses pain with deep palpation, some even at rest.  Fistula has good thrill   Skin: Skin color, texture, turgor normal. No rashes or lesions.    Neurologic: Normal strength, no focal deficit appreciated      Data Review:       Labs:     Recent Labs   Lab 04/19/25  2208 04/21/25  0642 04/22/25  0637   WBC 6.0 4.5 5.0   HGB 8.4* 8.0* 7.9*   MCV 94.6 93.5 91.6   .0 188.0 173.0       Recent Labs   Lab 04/19/25  2208 04/20/25  0658 04/21/25  0642 04/22/25  0637    141 141 142   K 3.6 3.7 3.6 4.1    100 103 103    CO2 25.0 26.0 25.0 24.0   BUN 53* 53* 63* 71*   CREATSERUM 8.12* 8.84* 10.21* 10.59*   CA 8.9 9.4 8.8 8.5*   MG  --   --  2.2 2.4   PHOS  --  6.2* 6.5* 7.0*   * 101* 108* 114*       Recent Labs   Lab 04/19/25  2208 04/20/25  0658 04/21/25  0642 04/22/25  0637   ALT 84*  --   --   --    AST 53*  --   --   --    ALB 4.1 4.1 3.9 3.8       No results for input(s): \"PGLU\" in the last 168 hours.    No results for input(s): \"TROP\" in the last 168 hours.      Meds:     Scheduled Medications[1]  Medication Infusions[2]  PRN Medications[3]       Assessment/Plan:     Patient is a 45 year old male with PMH sig for ESRD on MWF HD, hemorrhoids, diverticulosis, hypertension, bipolar disorder, history of frequent readmissions who presented for evaluation of shortness of breath.      Shortness of breath  ESRD on HD MWF  Hypertension  -CT chest abdomen pelvis obtained in the ER, not suggestive of fluid overload, patient clinically on room air laying flat  - No urgent indication for dialysis, was discussed with nephrology; plan for HD today, MWF schedule   - Patient has had evaluation per cardiology 3 times in the last month, initially holding cardiology eval as was chest pain-free  -Resume home nifedipine losartan hydralazine Coreg   -Tunneled dialysis catheter placement 4/22      Rule out left fistula infection  - Clinically does not appear erythematous warm, has a good thrill but patient endorses pain and noted to have some swelling  - Was discussed with  - vascular surgery-had a recent ultrasound in the outpatient setting and was noted to be normal, holding further imaging until seen by vascular surgery.  -Holding IV antibiotics at this time as concern for infection is low given lack of symptoms  -Was discussed with Dr. Jovel  - po norco prn for pain, no indication for IV pain meds at this time. Concern for dependence. Tramadol added as states in pain. Trying to maintain on orals, avoid morphine given ESRD  - pt  [facility] vascular placed fistula  - Left arm fistulogram c mild narrowing at the level of the elbow within the venous outflow   -Attempted HD 4/21 night but was unable to start HD. Per RN had clots coming from arterial but venous was ok. Vascular to eval for further assessment/plan of fistula; d/w MD Brandy   - consider LUE ultrasound          Bipolar disorder  - Continue bupropion and Lamictal     HFpEF, chronic  - Volume status with dialysis    CP  Chronically elevated trops   - doesn't seem significant but reports pressure on chest, \"someone sitting on him\"; usually volume issues but does have cardiac hx, will notify  - trop/EKG      FN:  - IVF:none  - Diet: cardiac     DVT Prophy: scd heparin subcu  Atrophy: ambulate as mary  Lines: piv     Dispo: CTU     Questions/concerns were discussed with patient by bedside. D/w RN. Kevin hospitalist to resume care in AM, will discuss plan with them      Total Time spent with patient and coordinating care:  55 minutes    Yuriy Forrest MD  [facility] Hospitalist  [phone]  4/22/2025  5:03 PM    Addendum  8/10 pain from perm cath, not helped c norco, give short course dilaudid    Yuriy Forrest MD  [facility] Hospitalist  [phone]  4/22/2025  6:46 PM             [1]    ARIPiprazole  10 mg Oral Daily    ARIPiprazole  5 mg Oral Daily    atorvastatin  20 mg Oral QPM    buPROPion ER  150 mg Oral Daily    calcium acetate  667 mg Oral TID with meals    carvedilol  25 mg Oral BID with meals    cloNIDine  0.1 mg Oral BID    FLUoxetine HCl  40 mg Oral Daily    gabapentin  200 mg Oral TID    hydrALAZINE  25 mg Oral TID    lamoTRIgine  100 mg Oral Daily    losartan  100 mg Oral Daily    NIFEdipine ER  30 mg Oral Daily    pantoprazole  40 mg Oral QAM AC    sevelamer carbonate  2,400 mg Oral TID CC    tamsulosin  0.4 mg Oral Daily    amphetamine-dextroamphetamine  10 mg Oral BID@0800,1200    fluticasone propionate  2 spray Each Nare Daily     heparin  5,000 Units Subcutaneous Q8H MARTHA    umeclidinium bromide  1 puff Inhalation Daily   [2] [3]   acetaminophen    traMADol    HYDROcodone-acetaminophen    cyclobenzaprine    polyethylene glycol (PEG 3350)    sennosides    bisacodyl    ondansetron    prochlorperazine    hydrALAzine    albuterol

## 2025-04-23 ENCOUNTER — APPOINTMENT (OUTPATIENT)
Dept: ULTRASOUND IMAGING | Facility: HOSPITAL | Age: 45
End: 2025-04-23
Attending: INTERNAL MEDICINE
Payer: MEDICARE

## 2025-04-23 LAB
ALBUMIN SERPL-MCNC: 4.1 G/DL (ref 3.2–4.8)
ANION GAP SERPL CALC-SCNC: 12 MMOL/L (ref 0–18)
BUN BLD-MCNC: 45 MG/DL (ref 9–23)
CALCIUM BLD-MCNC: 9 MG/DL (ref 8.7–10.6)
CHLORIDE SERPL-SCNC: 102 MMOL/L (ref 98–112)
CO2 SERPL-SCNC: 28 MMOL/L (ref 21–32)
CREAT BLD-MCNC: 8.05 MG/DL (ref 0.7–1.3)
DEPRECATED HBV CORE AB SER IA-ACNC: 471 NG/ML (ref 50–336)
EGFRCR SERPLBLD CKD-EPI 2021: 8 ML/MIN/1.73M2 (ref 60–?)
ERYTHROCYTE [DISTWIDTH] IN BLOOD BY AUTOMATED COUNT: 13.1 %
GLUCOSE BLD-MCNC: 107 MG/DL (ref 70–99)
HCT VFR BLD AUTO: 24.3 % (ref 39–53)
HGB BLD-MCNC: 8.2 G/DL (ref 13–17.5)
IRON SATN MFR SERPL: 19 % (ref 20–50)
IRON SERPL-MCNC: 41 UG/DL (ref 65–175)
MAGNESIUM SERPL-MCNC: 2.1 MG/DL (ref 1.6–2.6)
MCH RBC QN AUTO: 31.8 PG (ref 26–34)
MCHC RBC AUTO-ENTMCNC: 33.7 G/DL (ref 31–37)
MCV RBC AUTO: 94.2 FL (ref 80–100)
OSMOLALITY SERPL CALC.SUM OF ELEC: 306 MOSM/KG (ref 275–295)
PHOSPHATE SERPL-MCNC: 5.8 MG/DL (ref 2.4–5.1)
PLATELET # BLD AUTO: 188 10(3)UL (ref 150–450)
POTASSIUM SERPL-SCNC: 3.7 MMOL/L (ref 3.5–5.1)
RBC # BLD AUTO: 2.58 X10(6)UL (ref 4.3–5.7)
SODIUM SERPL-SCNC: 142 MMOL/L (ref 136–145)
TOTAL IRON BINDING CAPACITY: 216 UG/DL (ref 250–425)
TRANSFERRIN SERPL-MCNC: 165 MG/DL (ref 215–365)
WBC # BLD AUTO: 5 X10(3) UL (ref 4–11)

## 2025-04-23 PROCEDURE — 96372 THER/PROPH/DIAG INJ SC/IM: CPT

## 2025-04-23 PROCEDURE — 96376 TX/PRO/DX INJ SAME DRUG ADON: CPT

## 2025-04-23 PROCEDURE — 96365 THER/PROPH/DIAG IV INF INIT: CPT

## 2025-04-23 PROCEDURE — 93931 UPPER EXTREMITY STUDY: CPT | Performed by: INTERNAL MEDICINE

## 2025-04-23 PROCEDURE — 85027 COMPLETE CBC AUTOMATED: CPT | Performed by: INTERNAL MEDICINE

## 2025-04-23 PROCEDURE — 83540 ASSAY OF IRON: CPT | Performed by: INTERNAL MEDICINE

## 2025-04-23 PROCEDURE — 82728 ASSAY OF FERRITIN: CPT | Performed by: INTERNAL MEDICINE

## 2025-04-23 PROCEDURE — 83735 ASSAY OF MAGNESIUM: CPT | Performed by: INTERNAL MEDICINE

## 2025-04-23 PROCEDURE — 80069 RENAL FUNCTION PANEL: CPT | Performed by: HOSPITALIST

## 2025-04-23 PROCEDURE — 90935 HEMODIALYSIS ONE EVALUATION: CPT | Performed by: INTERNAL MEDICINE

## 2025-04-23 PROCEDURE — 94760 N-INVAS EAR/PLS OXIMETRY 1: CPT

## 2025-04-23 PROCEDURE — 83550 IRON BINDING TEST: CPT | Performed by: INTERNAL MEDICINE

## 2025-04-23 RX ORDER — HEPARIN SODIUM 1000 [USP'U]/ML
1.5 INJECTION, SOLUTION INTRAVENOUS; SUBCUTANEOUS
Status: COMPLETED | OUTPATIENT
Start: 2025-04-23 | End: 2025-04-23

## 2025-04-23 RX ORDER — ALBUMIN (HUMAN) 12.5 G/50ML
25 SOLUTION INTRAVENOUS
Status: DISCONTINUED | OUTPATIENT
Start: 2025-04-23 | End: 2025-04-24

## 2025-04-23 NOTE — PROGRESS NOTES
[facility] Hospitalist Progress Note     PCP: Adrian Small MD    Chief Complaint: follow-up   Follow up for: The encounter diagnosis was RAMSAY (dyspnea on exertion).    Overnight/Interim Events:      SUBJECTIVE:  Pain in LUE/fistula site. Also notes pain near HD cath insertion site.       OBJECTIVE:  Temp:  [97.5 °F (36.4 °C)-98.2 °F (36.8 °C)] 98.2 °F (36.8 °C)  Pulse:  [74-81] 78  Resp:  [17-23] 23  BP: ()/(60-77) 123/75  SpO2:  [94 %-99 %] 99 %    Intake/Output:    Intake/Output Summary (Last 24 hours) at 4/23/2025 1417  Last data filed at 4/22/2025 1721  Gross per 24 hour   Intake 480 ml   Output 4000 ml   Net -3520 ml       Last 3 Weights   04/20/25 0452 229 lb 14.4 oz (104.3 kg)   04/19/25 2126 240 lb (108.9 kg)   04/05/25 1044 240 lb (108.9 kg)   04/04/25 1532 247 lb 2.2 oz (112.1 kg)       Exam    Throat: Lips, mucosa, and tongue normal.     Neck: Supple, symmetrical, trachea midline,   Lungs:   Clear to auscultation bilaterally. Normal effort   Chest wall:  No tenderness or deformity.   Heart:  Regular rate and rhythm, S1, S2 normal,    Abdomen:   Soft, non-tender. Bowel sounds normal.  Non distended   Extremities: Extremities normal, atraumatic, left arm with fistula, with slightly more swelling than the right, no erythema, drainage appreciated, no warmth or signs of infection noted externally.  Patient endorses pain with deep palpation, some even at rest.  Fistula has good thrill   Skin: Skin color, texture, turgor normal. No rashes or lesions.    Neurologic: Normal strength, no focal deficit appreciated      Data Review:       Labs:     Recent Labs   Lab 04/19/25  2208 04/21/25  0642 04/22/25  0637 04/23/25  0756   WBC 6.0 4.5 5.0 5.0   HGB 8.4* 8.0* 7.9* 8.2*   MCV 94.6 93.5 91.6 94.2   .0 188.0 173.0 188.0       Recent Labs   Lab 04/19/25  2208 04/20/25  0658 04/21/25  0642 04/22/25  0637 04/23/25  0756    141 141 142 142   K 3.6 3.7 3.6 4.1 3.7    100 103 103 102   CO2 25.0 26.0  25.0 24.0 28.0   BUN 53* 53* 63* 71* 45*   CREATSERUM 8.12* 8.84* 10.21* 10.59* 8.05*   CA 8.9 9.4 8.8 8.5* 9.0   MG  --   --  2.2 2.4 2.1   PHOS  --  6.2* 6.5* 7.0* 5.8*   * 101* 108* 114* 107*       Recent Labs   Lab 04/19/25  2208 04/20/25  0658 04/21/25  0642 04/22/25  0637 04/23/25  0756   ALT 84*  --   --   --   --    AST 53*  --   --   --   --    ALB 4.1 4.1 3.9 3.8 4.1       No results for input(s): \"PGLU\" in the last 168 hours.    No results for input(s): \"TROP\" in the last 168 hours.      Meds:     Scheduled Medications[1]  Medication Infusions[2]  PRN Medications[3]       Assessment/Plan:     Patient is a 45 year old male with PMH sig for ESRD on MWF HD, hemorrhoids, diverticulosis, hypertension, bipolar disorder, history of frequent readmissions who presented for evaluation of shortness of breath.      Shortness of breath  ESRD on HD MWF  Hypertension  -CT chest abdomen pelvis obtained in the ER, not suggestive of fluid overload, patient clinically on room air laying flat  - No urgent indication for dialysis, was discussed with nephrology; plan for HD today, MWF schedule   - Patient has had evaluation per cardiology 3 times in the last month, initially holding cardiology eval as was chest pain-free  -Resume home nifedipine losartan hydralazine Coreg   -Tunneled dialysis catheter placement 4/22      Rule out left fistula infection  - Clinically does not appear erythematous warm, has a good thrill but patient endorses pain and noted to have some swelling  - Was discussed with  - vascular surgery-had a recent ultrasound in the outpatient setting and was noted to be normal, holding further imaging until seen by vascular surgery.  -Holding IV antibiotics at this time as concern for infection is low given lack of symptoms  -Was discussed with Dr. Jovel  - marlen norco prn for pain, no indication for IV pain meds at this time. Concern for dependence. Tramadol added as states in pain. Trying to  maintain on orals, avoid morphine given ESRD  - pt [facility]  - Left arm fistulogram c mild narrowing at the level of the elbow within the venous outflow   -Attempted HD 4/21 night but was unable to start HD. Per RN had clots coming from arterial but venous was ok.   -Discussed w/ vascular, plan to rest arm for 4-6 weeks, pending LUE ultrasound          Bipolar disorder  - Continue bupropion and Lamictal     HFpEF, chronic  - Volume status with dialysis    CP  Chronically elevated trops   - doesn't seem significant but reports pressure on chest, \"someone sitting on him\"; usually volume issues but does have cardiac hx, will notify  - trop/EKG      FN:  - IVF:none  - Diet: cardiac     DVT Prophy: scd heparin subcu  Atrophy: ambulate as mary  Lines: piv     Dispo: CTU, pending ultrasound      Questions/concerns were discussed with patient by bedside. D/w RN. [facility] hospitalist to resume care in AM, will discuss plan with them      Total Time spent with patient and coordinating care:  52 minutes    DO Kevin Roy [facility]  Hospitalist  Contact via [employer]             [1]    sodium ferric gluconate  125 mg Intravenous Once    heparin  1.5 mL Intracatheter Once    ARIPiprazole  10 mg Oral Daily    ARIPiprazole  5 mg Oral Daily    atorvastatin  20 mg Oral QPM    buPROPion ER  150 mg Oral Daily    calcium acetate  667 mg Oral TID with meals    carvedilol  25 mg Oral BID with meals    cloNIDine  0.1 mg Oral BID    FLUoxetine HCl  40 mg Oral Daily    gabapentin  200 mg Oral TID    hydrALAZINE  25 mg Oral TID    lamoTRIgine  100 mg Oral Daily    losartan  100 mg Oral Daily    NIFEdipine ER  30 mg Oral Daily    pantoprazole  40 mg Oral QAM AC    sevelamer carbonate  2,400 mg Oral TID CC    tamsulosin  0.4 mg Oral Daily    amphetamine-dextroamphetamine  10 mg Oral BID@0800,1200    fluticasone propionate  2 spray Each Nare Daily    heparin  5,000 Units Subcutaneous Q8H [facility]     umeclidinium bromide  1 puff Inhalation Daily   [2] [3]   sodium chloride **AND** albumin human    [DISCONTINUED] HYDROmorphone **OR** HYDROmorphone **OR** HYDROmorphone **OR** HYDROmorphone    acetaminophen    traMADol    HYDROcodone-acetaminophen    cyclobenzaprine    polyethylene glycol (PEG 3350)    sennosides    bisacodyl    ondansetron    prochlorperazine    hydrALAzine    albuterol

## 2025-04-23 NOTE — PROGRESS NOTES
[facility]   part of [facility]    Nephrology Progress Note    Godwin Fonseca Attending:  Yuriy Forrest MD     Cc: ESRD    SUBJECTIVE     Notes some dyspnea.  on arm. No other complains    PHYSICAL EXAM   Vital signs: /75 (BP Location: Right arm)   Pulse 78   Temp 98.2 °F (36.8 °C) (Oral)   Resp 23   Ht 6' 2\" (1.88 m)   Wt 229 lb 14.4 oz (104.3 kg)   SpO2 99%   BMI 29.52 kg/m²   Temp (24hrs), Av °F (36.7 °C), Min:97.5 °F (36.4 °C), Max:98.2 °F (36.8 °C)       Intake/Output Summary (Last 24 hours) at 2025 1348  Last data filed at 2025 1721  Gross per 24 hour   Intake 480 ml   Output 4000 ml   Net -3520 ml     Wt Readings from Last 3 Encounters:   25 229 lb 14.4 oz (104.3 kg)   25 240 lb (108.9 kg)   25 247 lb 2.2 oz (112.1 kg)     General: NAD  HEENT: NCAT, EOMI, MMM  Neck: Supple   Cardiac: Regular rate and rhythm   Lungs: CTAB  Abdomen: Soft, non-tender, nondistended   Extremities: No LE edema  Neurologic: No asterxis  Skin: Warm and dry, no rashes     MEDS   Current Hospital Medications[1]    LABS     Lab Results   Component Value Date    WBC 5.0 2025    HGB 8.2 2025    HCT 24.3 2025    .0 2025    CREATSERUM 8.05 2025    BUN 45 2025     2025    K 3.7 2025     2025    CO2 28.0 2025     2025    CA 9.0 2025    ALB 4.1 2025    MG 2.1 2025    PHOS 5.8 2025       IMAGING   All imaging studies personally reviewed.    IR CENTRAL VENOUS ACCESS   Final Result                     =====   PROCEDURE:  IR PERMANENT CATHETER INSERTION EXCHNGE CHECK       INDICATIONS:  ESRD       TECHNIQUE:        Prior to the procedure, I discussed with the patient and/or legal    representative the potential benefits, risks, and side effects of this    procedure, the likelihood of the patient achieving goals; and the    potential problems that might occur  during    recuperation.  I discussed reasonable alternatives to the procedure,    including risks, benefits, steps to prevent infection and side effects    related to the alternatives, and risks related to not receiving this    procedure. A witnessed verbal and signed    consent was obtained and documented in the patient's chart.        IV was checked and maintained by the nurse. Moderate conscious sedation    was performed under continuous pulse oximetry and cardiac monitoring under    my direct supervision.  The radiology nurse was in attendance throughout    the exam. Moderate conscious    sedation of 2 mg Versed and 100 mcg of Fentanyl was given.  Patient was    assessed and monitoring of oxygen saturation, heart rate, and blood    pressure by the nursing staff and myself during the exam for a total    intraservice time of 12 minutes of conscious    sedation time from 915 to 927.  Two grams of Ancef were given    pre-procedurally via existing IV.       The patient was placed supine on the angiographic table and the left chest    and neck was prepped and covered with a full body drape in the usual    sterile fashion.  All operators present for the case performed standard    pre-procedural prep including hand    washing, sterile gloves, gown, mask, and cap.  All aspects of the maximum    sterile barrier technique were followed.       A preprocedural time out was performed with all physicians, technologists,    and nurses involved with the procedure.   Ultrasound of the right neck demonstrated a severely stenotic right    internal jugular vein.   Sonography of the left  neck demonstrated a patent internal jugular vein    and a permanent image was obtained.  Under sonographic guidance, the left      internal jugular vein was accessed using a micropuncture system.  A    guidewire was advanced into the IVC    under fluoroscopic guidance.  Using blunt dissection a dual lumen tunneled    catheter was placed via a  peel-away sheath.  Both lumens flushed and    returned easily.  The catheter was secured and heparinized.  A small    amount of Dermabond was placed at the    neck venotomy site.   Sterile gauze and transparent dressing was applied.       The patient tolerated the procedure well and there were no immediate    complications. Routine post tunneled catheter insertion instructions were    communicated to the patient.       ESTIMATED BLOOD LOSS: Less than 5cc.       FLUORO TIME/DOSE:  0.4 minutes       AIR KERMA:  5.84 mGy       IMPRESSION: Successful placement of left internal jugular vein tunneled    dialysis catheter ( 27 cm tip to cuff Bard HemoSplit catheter)           LOCATION:  [facility]               Dictated by (CST): Duy De La Rosa MD on 4/22/2025 at 10:16 AM        Finalized by (CST): Duy De La Rosa MD on 4/22/2025 at 10:18 AM         IR GRAFT PROCEDURE   Final Result   PROCEDURE:  IR AV GRAFT DECLOT CHECK       INDICATIONS:  Left arm swelling       TECHNIQUE:    Prior to the procedure, I discussed with the patient and/or legal    representative the potential benefits, risks, and side effects of this    procedure, the likelihood of the patient achieving goals; and the    potential problems that might occur during    recuperation.  I discussed reasonable alternatives to the procedure,    including risks, benefits, steps to prevent infection and side effects    related to the alternatives, and risks related to not receiving this    procedure. A witnessed verbal and signed    consent was obtained and documented in the patient's chart.        IV was checked and maintained by the nurse. Moderate conscious sedation    was performed under continuous pulse oximetry and cardiac monitoring under    my direct supervision.  The radiology nurse was in attendance throughout    the exam. Moderate conscious    sedation of 1 mg Versed and 50 mcg of Fentanyl was given.  Patient was    assessed and monitoring of oxygen saturation,  heart rate, and blood    pressure by the nursing staff and myself during the exam for a total    intraservice time of 28 minutes of conscious    sedation time from 12:26 p.m. to 12:54 p.m..        The patient was placed supine on the angiographic table and the left arm    was prepped and covered with a full body drape in the usual sterile    fashion.  All operators present for the case performed standard    pre-procedural prep including hand washing,    sterile gloves, gown, mask, and cap.  All aspects of the maximum sterile    barrier technique were followed.       A preprocedural time out was performed with all physicians, technologists,    and nurses involved with the procedure.       The shunt was accessed with a micropuncture system pointing toward the    venous limb.  Shuntogram and central venograms were obtained.       SHUNTOGRAM:  The shunt was patent.  The axillary, subclavian, innominate    vein and SVC were patent. There was a stenosis within the venous outflow    at the level of the elbow.       Due to tortuosity proximal to the area of narrowing, a balloon catheter    cannot be tracked over the wire to this location.           Catheters were removed and hemostasis was achieved with manual    compression.  The patient tolerated the procedure well without any    immediate procedural complication.       CONTRAST:  50 mL of Isovue-300       ESTIMATED BLOOD LOSS: Less than 5cc.       FLUOROSCOPY TIME/DOSE: 5.3       AIR KERMA: 17.91 mGy                         =====   CONCLUSION:    1. There is a narrowing within the venous outflow at the level the elbow.     Due to tortuosity proximal to this narrowing, a balloon catheter cannot be    advanced into the location in order to angioplasty.  There is good flow    through the fistula noted as    there are numerous collaterals proximal to this narrowing.   2. After discussion with Dr. Coombs, an attempt for dialysis will be made    through the fistula and if  unsuccessful a tunneled dialysis catheter will    be placed.         PLAN:  Patient will follow-up with nephrology and at dialysis.       LOCATION:  [facility]                        Dictated by (CST): Quinn Bernal MD on 4/21/2025 at 12:59 PM        Finalized by (CST): Quinn Bernal MD on 4/21/2025 at 1:15 PM         CT CHEST+ABDOMEN+PELVIS(CPT=71250/18421)   Final Result   PROCEDURE:  CT CHEST+ABDOMEN+PELVIS(CPT=71250/27568)       COMPARISON:  [facility], CT, CTA CHEST + CT ABD (W) + CT PEL (W)    SH(CPT=71275/71630), 3/29/2025, 4:34 PM.       INDICATIONS:  abnl cxr, SOB, llq pain       TECHNIQUE:  Following oral contrast administration, unenhanced multislice    CT scanning is performed through the chest, abdomen, and pelvis.  Dose    reduction techniques were used. Dose information is transmitted to the ACR    (American College of Radiology)    NRDR (National Radiology Data Registry) which includes the Dose Index    Registry.       PATIENT STATED HISTORY: (As transcribed by Technologist)  Patient reports    shortness of breath, lower left abdominal pain and generalized weakness.             FINDINGS:         CHEST:     Left atrial enlargement.  No pericardial effusion.  Mitral valve    prosthesis noted.  Mild coronary calcification.  Normal caliber of the    thoracic aorta and pulmonary trunk.  Enlarged lymph nodes of the    paratracheal station are stable, index right lower    paratracheal node measuring 1.6 cm (series 2, image 45).  Normal thyroid    and esophagus.  No central airway stenosis.       Stable right lung volume loss with areas of partial atelectasis or scar of    the right middle and right lower lobes.  Upper lobe predominant emphysema.     Mild mosaic attenuation suggesting mild air trapping.  No acute airspace    disease.  No new or enlarging    pulmonary nodules.  Small right pleural effusion, similar to prior.  No    pneumothorax.       Regional soft tissues are within normal limits.  No  fracture or aggressive    osseous lesion.  Suture anchors of the left humeral head noted.       ABDOMEN/PELVIS:   Normal liver morphology.  Parenchymal hyperdensity of the liver may    reflect sequela of amiodarone therapy.  Normal gallbladder, bile ducts,    spleen, pancreas, and adrenal glands.  Bilateral renal cortical atrophy.     Subtle hyperdensity of the right distal    ureter near the UVJ (series 2, image 234, 235) may represent small    ureteral calculi, though there is no evidence of collecting system    obstruction.       Normal stomach.  No evidence of bowel inflammation or obstruction.  Normal    appendix.  Pancolonic diverticulosis without evidence of acute    diverticulitis.       Concentric bladder wall thickening, likely accentuated by under    distention.  Normal prostate and seminal vesicles.       No ascites, pneumoperitoneum, or regional lymphadenopathy.       No abdominal aortic aneurysm.       Regional soft tissues are within normal limits.  Osteoarthritis of the    hips and spine.  No aggressive osseous lesions.                         =====   CONCLUSION:         1. No acute process identified within the chest, abdomen, or pelvis.       2. Scarring and volume loss of the right lung, similar compared to    03/29/2025.  Stable small right pleural effusion.  Stable mediastinal    lymphadenopathy.       3. Punctate hyperdensity of the right distal ureter near the UVJ may    reflect urolithiasis, though without evidence of collecting system    obstruction.       4. Pancolonic diverticulosis without evidence of acute diverticulitis.                     LOCATION:  [facility]               Dictated by (CST): Claude Singh MD on 4/20/2025 at 0:37 AM        Finalized by (CST): Claude Singh MD on 4/20/2025 at 0:46 AM         XR CHEST AP PORTABLE  (CPT=71045)   Final Result   PROCEDURE:  XR CHEST AP PORTABLE  (CPT=71045)       TECHNIQUE:  AP chest radiograph was obtained.       COMPARISON:  [facility], XR, XR  CHEST PA + LAT CHEST (XLU=07484),    4/04/2025, 5:25 PM.       INDICATIONS:  shortness of breath, abdominal pain, problem with fistula       PATIENT STATED HISTORY: (As transcribed by Technologist)  Patient has    shortness of breath, dry cough, and chest tightness for around 2 weeks.    Patient stated tonight he started having left sided abdomen pain.             FINDINGS:                           =====   CONCLUSION:  Stable cardiac and mediastinal contours with valvular    prosthesis noted.  There continues to be confluent opacification of the    right lower lung which likely represents some combination of atelectasis,    airspace disease, and pleural effusion.     No appreciable pneumothorax.           LOCATION:  [facility]                       Dictated by (CST): Claude Singh MD on 4/19/2025 at 11:02 PM        Finalized by (CST): Claude Singh MD on 4/19/2025 at 11:03 PM         US VENOUS DOPPLER ARM LEFT - DIAG IMG (CPT=93971)    (Results Pending)         ASSESSMENT & PLAN   45 year old male with PMH of ESRD on iHD MWF, HTN, MR s/p MVR, HFpEF, TIA, DVT/PE, MGUS, presents with RAMSAY:     ESRD on HD MWF:  -- [facility] in [place]  -- last HD Friday    AVF malfunction  -- IR fistulagram 4/21/25 noted Mild narrowing at the level of the elbow within the venous outflow. No intervention   -- attempted HD, unable to dialyze, 2 RNs attempted. Pt w pain and swelling. Vascular surgery consulted, await recs    LUE erythema  -- around fistula site      Anemia in ESRD:  -- below goal will obtain Iron stores     MBD / Hyperphosphatemia:  -- Sevelamer 2400mg PO TID  -- Calcium Acetate 667 TID     HTN:  -- Resume Home medications     RAMSAY:  -- Does not appear to be volume mediated at this time.        plan:   - sp HD yesterday as missed Monday. Plan for HD today via perm cath to get back on MWF schedule, UF 3-4L, HD directly managed, 3K  - vascular surgery consult appreciated, d/w Dr Goodwin, plan to rest arm for 4-6 wks and do not use till  seen by vascular in the office and given ok. May need revision/etc.   -- still awaiting formal LUE ultrasound, await recs further after  -- iron stores low, give IV iron  - avoid hypotension  - renally adjust medications  - ok to use Iodinated contrast  - avoid morphine w ESRD        Thank you for allowing me to participate in the care of this patient. Please do not hesitate to call with questions or concerns.       Samaria Nava MD  [facility] Nephrology         [1]   Current Facility-Administered Medications   Medication Dose Route Frequency    HYDROmorphone (Dilaudid) 1 MG/ML injection 0.2 mg  0.2 mg Intravenous Q6H PRN    Or    HYDROmorphone (Dilaudid) 1 MG/ML injection 0.4 mg  0.4 mg Intravenous Q6H PRN    Or    HYDROmorphone (Dilaudid) 1 MG/ML injection 0.2 mg  0.2 mg Intravenous Q6H PRN    acetaminophen (Tylenol Extra Strength) tab 500 mg  500 mg Oral Q4H PRN    traMADol (Ultram) tab 50 mg  50 mg Oral Q12H PRN    ARIPiprazole (Abilify) tab 10 mg  10 mg Oral Daily    ARIPiprazole (Abilify) tab 5 mg  5 mg Oral Daily    atorvastatin (Lipitor) tab 20 mg  20 mg Oral QPM    buPROPion ER (Wellbutrin XL) 24 hr tab 150 mg  150 mg Oral Daily    calcium acetate (Phoslo) cap 667 mg  667 mg Oral TID with meals    carvedilol (Coreg) tab 25 mg  25 mg Oral BID with meals    cloNIDine (Catapres) tab 0.1 mg  0.1 mg Oral BID    FLUoxetine (PROzac) cap 40 mg  40 mg Oral Daily    gabapentin (Neurontin) cap 200 mg  200 mg Oral TID    hydrALAZINE (Apresoline) tab 25 mg  25 mg Oral TID    lamoTRIgine (LaMICtal) tab 100 mg  100 mg Oral Daily    losartan (Cozaar) tab 100 mg  100 mg Oral Daily    NIFEdipine ER (Procardia-XL) 24 hr tab 30 mg  30 mg Oral Daily    pantoprazole (Protonix) DR tab 40 mg  40 mg Oral QAM AC    sevelamer carbonate (Renvela) tab 2,400 mg  2,400 mg Oral TID CC    tamsulosin (Flomax) cap 0.4 mg  0.4 mg Oral Daily    amphetamine-dextroamphetamine (Adderall) tab 10 mg  10 mg Oral BID@0800,1200     HYDROcodone-acetaminophen (Norco)  MG per tab 1 tablet  1 tablet Oral Q6H PRN    cyclobenzaprine (Flexeril) tab 5 mg  5 mg Oral TID PRN    fluticasone propionate (Flonase) 50 MCG/ACT nasal suspension 2 spray  2 spray Each Nare Daily    heparin (Porcine) 5000 UNIT/ML injection 5,000 Units  5,000 Units Subcutaneous Q8H MARTHA    polyethylene glycol (PEG 3350) (Miralax) 17 g oral packet 17 g  17 g Oral Daily PRN    sennosides (Senokot) tab 17.2 mg  17.2 mg Oral Nightly PRN    bisacodyl (Dulcolax) 10 MG rectal suppository 10 mg  10 mg Rectal Daily PRN    ondansetron (Zofran) 4 MG/2ML injection 4 mg  4 mg Intravenous Q6H PRN    prochlorperazine (Compazine) 10 MG/2ML injection 5 mg  5 mg Intravenous Q8H PRN    umeclidinium bromide (Incruse Ellipta) 62.5 MCG/ACT inhaler 1 puff  1 puff Inhalation Daily    hydrALAzine (Apresoline) 20 mg/mL injection 10 mg  10 mg Intravenous Q4H PRN    albuterol (Ventolin HFA) 108 (90 Base) MCG/ACT inhaler 2 puff  2 puff Inhalation Q6H PRN

## 2025-04-23 NOTE — CM/SW NOTE
SEFERINO received call from Lizzy at [facility] stating pt is not an HD pt at the clinic. Lizzy stated pt transferred to [facility]. Lizzy provided contact number for [facility] [phone].    SEFERINO spoke with the  at [facility] who confirmed pt is at the clinic MWF for HD. The  confirmed pt is able to return at discharge and provided fax number [phone].    [facility]   [address] [facility] [address]  [place] [address]    SEFERINO faxed permacath report to [facility] at fax: [phone].    JOSH Hammond  Discharge Planner

## 2025-04-23 NOTE — PLAN OF CARE
Pt Aox4, NSR, room air,   PRN Dilaudid given for pain  Permacath to Left jugular, dressing clean and dry  Regular diet. Left arm restriction   HD today.    Problem: CARDIOVASCULAR - ADULT  Goal: Maintains optimal cardiac output and hemodynamic stability  Description: INTERVENTIONS:- Monitor vital signs, rhythm, and trends- Monitor for bleeding, hypotension and signs of decreased cardiac output- Evaluate effectiveness of vasoactive medications to optimize hemodynamic stability- Monitor arterial and/or venous puncture sites for bleeding and/or hematoma- Assess quality of pulses, skin color and temperature- Assess for signs of decreased coronary artery perfusion - ex. Angina- Evaluate fluid balance, assess for edema, trend weights  Outcome: Progressing  Goal: Absence of cardiac arrhythmias or at baseline  Description: INTERVENTIONS:- Continuous cardiac monitoring, monitor vital signs, obtain 12 lead EKG if indicated- Evaluate effectiveness of antiarrhythmic and heart rate control medications as ordered- Initiate emergency measures for life threatening arrhythmias- Monitor electrolytes and administer replacement therapy as ordered  Outcome: Progressing

## 2025-04-23 NOTE — PLAN OF CARE
Assumed care @1930, pt is alert and oriented x4, c/o of pain in LUE/fistula, tramadol was given.  Dilaudid q6h. Ra, 2 L of oxygen for comfort, NSR on tele,reg diet, heparin for prophyl. Larm precaution from fistula. SBA, . RA PIC c/d/i   with Saline lock. All safety precaution is in place. Plan of care is ongoing.  CHG was applied to the patient by this RN and PCT  Problem: PAIN - ADULT  Goal: Verbalizes/displays adequate comfort level or patient's stated pain goal  Description: INTERVENTIONS:- Encourage pt to monitor pain and request assistance- Assess pain using appropriate pain scale- Administer analgesics based on type and severity of pain and evaluate response- Implement non-pharmacological measures as appropriate and evaluate response- Consider cultural and social influences on pain and pain management- Manage/alleviate anxiety- Utilize distraction and/or relaxation techniques- Monitor for opioid side effects- Notify MD/LIP if interventions unsuccessful or patient reports new pain- Anticipate increased pain with activity and pre-medicate as appropriate  Outcome: Progressing     Problem: CARDIOVASCULAR - ADULT  Goal: Maintains optimal cardiac output and hemodynamic stability  Description: INTERVENTIONS:- Monitor vital signs, rhythm, and trends- Monitor for bleeding, hypotension and signs of decreased cardiac output- Evaluate effectiveness of vasoactive medications to optimize hemodynamic stability- Monitor arterial and/or venous puncture sites for bleeding and/or hematoma- Assess quality of pulses, skin color and temperature- Assess for signs of decreased coronary artery perfusion - ex. Angina- Evaluate fluid balance, assess for edema, trend weights  Outcome: Progressing  Goal: Absence of cardiac arrhythmias or at baseline  Description: INTERVENTIONS:- Continuous cardiac monitoring, monitor vital signs, obtain 12 lead EKG if indicated- Evaluate effectiveness of antiarrhythmic and heart rate control medications  as ordered- Initiate emergency measures for life threatening arrhythmias- Monitor electrolytes and administer replacement therapy as ordered  Outcome: Progressing

## 2025-04-23 NOTE — PROGRESS NOTES
[facility] Cardiology  Progress Note    Godwin Fonseca Patient Status:  Observation    1980 MRN [id number]   [facility] 3NE-A Attending Binu Singh, DO   Hosp Day # 1 PCP Adrian Small MD       Impression:  1. long-standing HTN complicated by ESRD on HD--> unable to dialyze through AV fistula, uncontrolled BP as a result--> now s/p perm cath placement 25.     2. chronically elevated troponin, in setting of ESRD/HTN, usually in 200-500s range--> 282, ECG SR with non-spec repolarization changes     3. hx MV repair--> TTE (3/25): LVEF 60-65%, s/p MV repair- 6mmHg gradient, mod MR     4. chronic anemia, likely AoCD/ESRD     Recommendations:  - chronic persistent CP/SOB and chronically elevated HS troponin in setting of uncontrolled BP.  Reviewed prior LHC  (similar symptoms/cardiac enzymes)- no significant CAD.  CAD related ischemic disease unlikely- stress testing/LHC likely low yield.  Will defer.    - follow telemetry  - HD schedule per nephrology.      Subjective:  The patient denies any resting chest pain or dyspnea at this time.    Objective:  /75 (BP Location: Right arm)   Pulse 78   Temp 98.2 °F (36.8 °C) (Oral)   Resp 23   Ht 6' 2\" (1.88 m)   Wt 229 lb 14.4 oz (104.3 kg)   SpO2 99%   BMI 29.52 kg/m²   Temp (24hrs), Av °F (36.7 °C), Min:97.5 °F (36.4 °C), Max:98.2 °F (36.8 °C)      Intake/Output Summary (Last 24 hours) at 2025 1305  Last data filed at 2025 1721  Gross per 24 hour   Intake 480 ml   Output 4000 ml   Net -3520 ml     Wt Readings from Last 3 Encounters:   25 229 lb 14.4 oz (104.3 kg)   25 240 lb (108.9 kg)   25 247 lb 2.2 oz (112.1 kg)       General: Awake and alert; in no acute distress  Cardiac: Regular rate and regular rhythm; no murmurs/rubs/gallops are appreciated  Lungs: Clear to auscultation bilaterally; no accessory muscle use  Abdomen: Soft, non-tender; bowel sounds are normoactive  Extremities: No  clubbing/cyanosis; moves all 4 extremities normally    Current Hospital Medications[1]    Laboratory Data:  Lab Results   Component Value Date    WBC 5.0 04/23/2025    HGB 8.2 04/23/2025    HCT 24.3 04/23/2025    .0 04/23/2025     Lab Results   Component Value Date    INR 1.08 03/29/2025    INR 1.07 03/08/2025    INR 1.08 02/03/2025     Lab Results   Component Value Date     04/23/2025    K 3.7 04/23/2025     04/23/2025    CO2 28.0 04/23/2025    BUN 45 04/23/2025    CREATSERUM 8.05 04/23/2025     04/23/2025    CA 9.0 04/23/2025    MG 2.1 04/23/2025       Telemetry: No malignant tachyarrhythmias or bradyarrhythmias      Thank you for allowing our practice to participate in the care of your patient. Please do not hesitate to contact me if you have any questions.             [1]   Current Facility-Administered Medications   Medication Dose Route Frequency    HYDROmorphone (Dilaudid) 1 MG/ML injection 0.2 mg  0.2 mg Intravenous Q6H PRN    Or    HYDROmorphone (Dilaudid) 1 MG/ML injection 0.4 mg  0.4 mg Intravenous Q6H PRN    Or    HYDROmorphone (Dilaudid) 1 MG/ML injection 0.2 mg  0.2 mg Intravenous Q6H PRN    acetaminophen (Tylenol Extra Strength) tab 500 mg  500 mg Oral Q4H PRN    traMADol (Ultram) tab 50 mg  50 mg Oral Q12H PRN    ARIPiprazole (Abilify) tab 10 mg  10 mg Oral Daily    ARIPiprazole (Abilify) tab 5 mg  5 mg Oral Daily    atorvastatin (Lipitor) tab 20 mg  20 mg Oral QPM    buPROPion ER (Wellbutrin XL) 24 hr tab 150 mg  150 mg Oral Daily    calcium acetate (Phoslo) cap 667 mg  667 mg Oral TID with meals    carvedilol (Coreg) tab 25 mg  25 mg Oral BID with meals    cloNIDine (Catapres) tab 0.1 mg  0.1 mg Oral BID    FLUoxetine (PROzac) cap 40 mg  40 mg Oral Daily    gabapentin (Neurontin) cap 200 mg  200 mg Oral TID    hydrALAZINE (Apresoline) tab 25 mg  25 mg Oral TID    lamoTRIgine (LaMICtal) tab 100 mg  100 mg Oral Daily    losartan (Cozaar) tab 100 mg  100 mg Oral Daily     NIFEdipine ER (Procardia-XL) 24 hr tab 30 mg  30 mg Oral Daily    pantoprazole (Protonix) DR tab 40 mg  40 mg Oral QAM AC    sevelamer carbonate (Renvela) tab 2,400 mg  2,400 mg Oral TID CC    tamsulosin (Flomax) cap 0.4 mg  0.4 mg Oral Daily    amphetamine-dextroamphetamine (Adderall) tab 10 mg  10 mg Oral BID@0800,1200    HYDROcodone-acetaminophen (Norco)  MG per tab 1 tablet  1 tablet Oral Q6H PRN    cyclobenzaprine (Flexeril) tab 5 mg  5 mg Oral TID PRN    fluticasone propionate (Flonase) 50 MCG/ACT nasal suspension 2 spray  2 spray Each Nare Daily    heparin (Porcine) 5000 UNIT/ML injection 5,000 Units  5,000 Units Subcutaneous Q8H MARTHA    polyethylene glycol (PEG 3350) (Miralax) 17 g oral packet 17 g  17 g Oral Daily PRN    sennosides (Senokot) tab 17.2 mg  17.2 mg Oral Nightly PRN    bisacodyl (Dulcolax) 10 MG rectal suppository 10 mg  10 mg Rectal Daily PRN    ondansetron (Zofran) 4 MG/2ML injection 4 mg  4 mg Intravenous Q6H PRN    prochlorperazine (Compazine) 10 MG/2ML injection 5 mg  5 mg Intravenous Q8H PRN    umeclidinium bromide (Incruse Ellipta) 62.5 MCG/ACT inhaler 1 puff  1 puff Inhalation Daily    hydrALAzine (Apresoline) 20 mg/mL injection 10 mg  10 mg Intravenous Q4H PRN    albuterol (Ventolin HFA) 108 (90 Base) MCG/ACT inhaler 2 puff  2 puff Inhalation Q6H PRN

## 2025-04-24 VITALS
HEIGHT: 74 IN | DIASTOLIC BLOOD PRESSURE: 83 MMHG | OXYGEN SATURATION: 93 % | WEIGHT: 229.88 LBS | BODY MASS INDEX: 29.5 KG/M2 | RESPIRATION RATE: 18 BRPM | SYSTOLIC BLOOD PRESSURE: 119 MMHG | TEMPERATURE: 99 F | HEART RATE: 85 BPM

## 2025-04-24 LAB
ALBUMIN SERPL-MCNC: 4.3 G/DL (ref 3.2–4.8)
ANION GAP SERPL CALC-SCNC: 13 MMOL/L (ref 0–18)
BUN BLD-MCNC: 34 MG/DL (ref 9–23)
CALCIUM BLD-MCNC: 9.7 MG/DL (ref 8.7–10.6)
CHLORIDE SERPL-SCNC: 101 MMOL/L (ref 98–112)
CO2 SERPL-SCNC: 26 MMOL/L (ref 21–32)
CREAT BLD-MCNC: 7.14 MG/DL (ref 0.7–1.3)
EGFRCR SERPLBLD CKD-EPI 2021: 9 ML/MIN/1.73M2 (ref 60–?)
GLUCOSE BLD-MCNC: 114 MG/DL (ref 70–99)
MAGNESIUM SERPL-MCNC: 2.2 MG/DL (ref 1.6–2.6)
OSMOLALITY SERPL CALC.SUM OF ELEC: 298 MOSM/KG (ref 275–295)
PHOSPHATE SERPL-MCNC: 6.4 MG/DL (ref 2.4–5.1)
POTASSIUM SERPL-SCNC: 4 MMOL/L (ref 3.5–5.1)
SODIUM SERPL-SCNC: 140 MMOL/L (ref 136–145)

## 2025-04-24 PROCEDURE — 80069 RENAL FUNCTION PANEL: CPT | Performed by: HOSPITALIST

## 2025-04-24 PROCEDURE — 96372 THER/PROPH/DIAG INJ SC/IM: CPT

## 2025-04-24 PROCEDURE — 83735 ASSAY OF MAGNESIUM: CPT | Performed by: INTERNAL MEDICINE

## 2025-04-24 PROCEDURE — 96376 TX/PRO/DX INJ SAME DRUG ADON: CPT

## 2025-04-24 PROCEDURE — 94760 N-INVAS EAR/PLS OXIMETRY 1: CPT

## 2025-04-24 RX ORDER — ALBUMIN (HUMAN) 12.5 G/50ML
25 SOLUTION INTRAVENOUS
Status: DISCONTINUED | OUTPATIENT
Start: 2025-04-24 | End: 2025-04-24

## 2025-04-24 NOTE — DISCHARGE SUMMARY
General Medicine Discharge Summary     Patient ID:  Godwin Fonseca  45 year old  4/12/1980    Admit date: 3/14/2025    Discharge date and time: 3/16/2025  5:30 PM     Attending Physician: No att. providers found     Consults: IP CONSULT TO NEPHROLOGY    Primary Care Physician: Adrian Small MD     Reason for admission: HTN urgency    Risk For Readmission: HIGH    Discharge Diagnoses: Hypertensive urgency [I16.0]  ESRD needing dialysis (HCC) [N18.6, Z99.2]  Hypervolemia, unspecified hypervolemia type [E87.70]  See Additional Discharge Diagnoses in Hospital Course    Discharged Condition: stable    Follow-up with labs/images appointments:     Exam  Gen: No acute distress  Pulm: Lungs clear, normal respiratory effort  CV: Heart with regular rate and rhythm  Abd: Abdomen soft,       HPI:     Hospital Course:     Patient is a 44 year old male ESRD on MWF HD, hemorrhoids, diverticulosis, hypertension and chronic anemia presented for shoertness of breath. Managed for HTN urgency 2/2 fluid overload. Seen and evalauted by cardiology and nephrology. 2D echo showed stable mitral valve. Received extra HD sessions and breathing improved. Cleared for dc with outpatient followup. Patient continues to have frequent readmissions, SW to assist, close follow up with PCP advised.    Shortness of breath  HTN urgency  2/2 fluid overload  - s/p HD, nitroglycerin off  - resume home antihypertensives  - cardio consult placed, plan for ECHO to check valve  - no chest pain to suggest ACS    ESRD on Hd  - MWF  - renal consult, s/p extra HD session     Recent GI bleed  Internal hemoorroids  AOCD  Acute on chronic anemia    #Bipolar  #HFpEF  -Resume home meds accordingly         Operative Procedures:      Imaging: US ARTERIAL DUPLEX UPPER EXTREMITY LEFT (CPT=93931)  Result Date: 4/23/2025  CONCLUSION:  1. Left upper extremity fistula.  Approximately 2.6 cm segment of thrombus within the venous outflow tract the level of the elbow.  This  corresponds to the recent fistulagram findings. 2. Arterial venous anastomosis is patent.   LOCATION:  [facility]    Dictated by (CST): Duy De La Rosa MD on 4/23/2025 at 3:25 PM     Finalized by (CST): Duy De La Rosa MD on 4/23/2025 at 3:29 PM       IR CENTRAL VENOUS ACCESS  Result Date: 4/22/2025  PROCEDURE:  IR PERMANENT CATHETER INSERTION EXCHNGE CHECK  INDICATIONS:  ESRD  TECHNIQUE:  Prior to the procedure, I discussed with the patient and/or legal representative the potential benefits, risks, and side effects of this procedure, the likelihood of the patient achieving goals; and the potential problems that might occur during recuperation.  I discussed reasonable alternatives to the procedure, including risks, benefits, steps to prevent infection and side effects related to the alternatives, and risks related to not receiving this procedure. A witnessed verbal and signed consent was obtained and documented in the patient's chart.  IV was checked and maintained by the nurse. Moderate conscious sedation was performed under continuous pulse oximetry and cardiac monitoring under my direct supervision.  The radiology nurse was in attendance throughout the exam. Moderate conscious sedation of 2 mg Versed and 100 mcg of Fentanyl was given.  Patient was assessed and monitoring of oxygen saturation, heart rate, and blood pressure by the nursing staff and myself during the exam for a total intraservice time of 12 minutes of conscious sedation time from 915 to 927.  Two grams of Ancef were given pre-procedurally via existing IV.  The patient was placed supine on the angiographic table and the left chest and neck was prepped and covered with a full body drape in the usual sterile fashion.  All operators present for the case performed standard pre-procedural prep including hand washing, sterile gloves, gown, mask, and cap.  All aspects of the maximum sterile barrier technique were followed.  A preprocedural time out was performed  with all physicians, technologists, and nurses involved with the procedure. Ultrasound of the right neck demonstrated a severely stenotic right internal jugular vein. Sonography of the left  neck demonstrated a patent internal jugular vein and a permanent image was obtained.  Under sonographic guidance, the left   internal jugular vein was accessed using a micropuncture system.  A guidewire was advanced into the IVC under fluoroscopic guidance.  Using blunt dissection a dual lumen tunneled catheter was placed via a peel-away sheath.  Both lumens flushed and returned easily.  The catheter was secured and heparinized.  A small amount of Dermabond was placed at the neck venotomy site.   Sterile gauze and transparent dressing was applied.  The patient tolerated the procedure well and there were no immediate complications. Routine post tunneled catheter insertion instructions were communicated to the patient.  ESTIMATED BLOOD LOSS: Less than 5cc.  FLUORO TIME/DOSE:  0.4 minutes  AIR KERMA:  5.84 mGy  IMPRESSION: Successful placement of left internal jugular vein tunneled dialysis catheter ( 27 cm tip to cuff Bard HemoSplit catheter)   LOCATION:  [facility]    Dictated by (CST): Duy De La Rosa MD on 4/22/2025 at 10:16 AM     Finalized by (CST): Duy De La Rosa MD on 4/22/2025 at 10:18 AM       IR GRAFT PROCEDURE  Result Date: 4/21/2025  CONCLUSION: 1. There is a narrowing within the venous outflow at the level the elbow.  Due to tortuosity proximal to this narrowing, a balloon catheter cannot be advanced into the location in order to angioplasty.  There is good flow through the fistula noted as there are numerous collaterals proximal to this narrowing. 2. After discussion with Dr. Coombs, an attempt for dialysis will be made through the fistula and if unsuccessful a tunneled dialysis catheter will be placed.   PLAN:  Patient will follow-up with nephrology and at dialysis.  LOCATION:  [facility]       Dictated by (CST): Dolores  MD Quinn on 4/21/2025 at 12:59 PM     Finalized by (CST): Quinn Bernal MD on 4/21/2025 at 1:15 PM           Disposition: home    Activity: activity as tolerated  Diet: cardiac diet  Wound Care: none needed  Code Status: Full Code  O2: none    Home Medication Changes:     Med list     Medication List        START taking these medications      Polyethylene Glycol 3350 17 g Pack  Commonly known as: MIRALAX            CONTINUE taking these medications      albuterol 108 (90 Base) MCG/ACT Aers  Commonly known as: Ventolin HFA     * ARIPiprazole 5 MG Tabs  Commonly known as: Abilify     * ARIPiprazole 10 MG Tabs  Commonly known as: Abilify     atorvastatin 20 MG Tabs  Commonly known as: Lipitor     buPROPion  MG Tb24  Commonly known as: Wellbutrin XL     calcium acetate 667 MG Caps  Commonly known as: Phoslo  Take 1 capsule (667 mg total) by mouth with breakfast, with lunch, and with evening meal.     carvedilol 25 MG Tabs  Commonly known as: Coreg  Take 1 tablet (25 mg total) by mouth in the morning and 1 tablet (25 mg total) in the evening. Take with meals.     cloNIDine 0.1 MG Tabs  Commonly known as: Catapres  Take 1 tablet (0.1 mg total) by mouth 2 (two) times daily.     ergocalciferol 1.25 MG (61931 UT) Caps  Commonly known as: Vitamin D2     FLUoxetine HCl 40 MG Caps  Commonly known as: PROZAC  Take 1 capsule (40 mg total) by mouth daily.     fluticasone propionate 50 MCG/ACT Susp  Commonly known as: Flonase  SPRAY ONCE INTO EACH NOSTRIL BID PRN     gabapentin 100 MG Caps  Commonly known as: Neurontin  Take 2 capsules (200 mg total) by mouth 3 (three) times daily.     hydrALAZINE 25 MG Tabs  Commonly known as: Apresoline     HYDROcodone-acetaminophen  MG Tabs  Commonly known as: Norco     lamoTRIgine 100 MG Tabs  Commonly known as: LaMICtal     losartan 100 MG Tabs  Commonly known as: Cozaar     NIFEdipine ER 30 MG Tb24  Commonly known as: Adalat CC  Take 1 tablet (30 mg total) by mouth daily.      ondansetron 4 MG Tbdp  Commonly known as: Zofran-ODT     Preparation H 0.25 % Supp  Generic drug: Phenylephrine HCl  Place 1 suppository rectally 4 (four) times daily as needed (Hemorrhoids after bowel movements).     sevelamer carbonate 800 MG Tabs  Commonly known as: Renvela  Take 3 tablets (2,400 mg total) by mouth 3 (three) times daily with meals.     tamsulosin 0.4 MG Caps  Commonly known as: Flomax     Vyvanse 60 MG Caps  Generic drug: Lisdexamfetamine Dimesylate           * This list has 2 medication(s) that are the same as other medications prescribed for you. Read the directions carefully, and ask your doctor or other care provider to review them with you.                STOP taking these medications      polyethylene glycol (PEG 3350-KCl-NaBcb-NaCl-NaSulf) 236 g Solr  Commonly known as: Golytely              FU   Follow-up Information       Adrian Arce MD Follow up in 1 week(s).    Specialty: Internal Medicine  Contact information:  [address] [place] [id number] [phone]               Benja Reyes MD Follow up in 2 week(s).    Specialty: CARDIOLOGY  Why: Follow up with Dr. Reyes in [place] or your [facility] cardiologist in [place].  Contact information:  [address] [place] [id number] [phone]                             [place] instructions:      Discharge medications reviewed and reconciled.    Total Time Coordinating Care: Greater than 30 minutes    Patient had opportunity to ask questions and state understand and agree with therapeutic plan as outlined    Thank You,    DO Kevin Pritchard Hospitalist  Pager

## 2025-04-24 NOTE — PROGRESS NOTES
[facility]   part of [facility]    Nephrology Progress Note    Godwin Fonseca Attending:  Yuriy Forrest MD     Cc: ESRD    SUBJECTIVE     Notes some dyspnea.  on arm. No other complains    PHYSICAL EXAM   Vital signs: /83 (BP Location: Right arm)   Pulse 85   Temp 98.6 °F (37 °C) (Oral)   Resp 18   Ht 6' 2\" (1.88 m)   Wt 229 lb 14.4 oz (104.3 kg)   SpO2 93%   BMI 29.52 kg/m²   Temp (24hrs), Av.3 °F (36.8 °C), Min:98.1 °F (36.7 °C), Max:98.6 °F (37 °C)     No intake or output data in the 24 hours ending 25 1719    Wt Readings from Last 3 Encounters:   25 229 lb 14.4 oz (104.3 kg)   25 240 lb (108.9 kg)   25 247 lb 2.2 oz (112.1 kg)     General: NAD  HEENT: NCAT, EOMI, MMM  Neck: Supple   Cardiac: Regular rate and rhythm   Lungs: CTAB  Abdomen: Soft, non-tender, nondistended   Extremities: No LE edema  Neurologic: No asterxis  Skin: Warm and dry, no rashes     MEDS   Current Hospital Medications[1]    LABS     Lab Results   Component Value Date    CREATSERUM 7.14 2025    BUN 34 2025     2025    K 4.0 2025     2025    CO2 26.0 2025     2025    CA 9.7 2025    ALB 4.3 2025    MG 2.2 2025    PHOS 6.4 2025       IMAGING   All imaging studies personally reviewed.    US ARTERIAL DUPLEX UPPER EXTREMITY LEFT (CPT=93931)   Final Result   PROCEDURE:  US ARTERIAL DUPLEX UPPER EXTREMITY LEFT (CPT=93931)       COMPARISON:  None.       INDICATIONS:  Dialysis fistula malfunction       TECHNIQUE:  Real time grey scale and duplex ultrasound was used to    evaluate the upper extremity arterial system. B-mode two dimensional    images of the vascular structures, Doppler spectral analysis, and color    flow Doppler imaging were performed.       PATIENT STATED HISTORY: (As transcribed by Technologist)  Unsuccessful    dialysis access.            FINDINGS:     There is a radiocephalic  fistula in the left upper extremity.  The radial    artery is patent with flow velocity measuring up to 237 centimeters/second    proximal to the anastomosis and cell approximately 77 centimeters/second    distal to the anastomosis.     Arterial venous anastomosis is widely patent with flow rate measuring    approximately 160 centimeters per 2nd.  The venous outflow is patent    proximally, though appears occluded at the level of the elbow with    intramural thrombus spanning a segment of    approximately 2.6 centimeters.                             =====   CONCLUSION:     1. Left upper extremity fistula.  Approximately 2.6 cm segment of thrombus    within the venous outflow tract the level of the elbow.  This corresponds    to the recent fistulagram findings.   2. Arterial venous anastomosis is patent.           LOCATION:  [facility]               Dictated by (CST): Duy De La Rosa MD on 4/23/2025 at 3:25 PM        Finalized by (CST): Duy De La Rosa MD on 4/23/2025 at 3:29 PM         IR CENTRAL VENOUS ACCESS   Final Result                     =====   PROCEDURE:  IR PERMANENT CATHETER INSERTION EXCHNGE CHECK       INDICATIONS:  ESRD       TECHNIQUE:        Prior to the procedure, I discussed with the patient and/or legal    representative the potential benefits, risks, and side effects of this    procedure, the likelihood of the patient achieving goals; and the    potential problems that might occur during    recuperation.  I discussed reasonable alternatives to the procedure,    including risks, benefits, steps to prevent infection and side effects    related to the alternatives, and risks related to not receiving this    procedure. A witnessed verbal and signed    consent was obtained and documented in the patient's chart.        IV was checked and maintained by the nurse. Moderate conscious sedation    was performed under continuous pulse oximetry and cardiac monitoring under    my direct supervision.  The radiology  nurse was in attendance throughout    the exam. Moderate conscious    sedation of 2 mg Versed and 100 mcg of Fentanyl was given.  Patient was    assessed and monitoring of oxygen saturation, heart rate, and blood    pressure by the nursing staff and myself during the exam for a total    intraservice time of 12 minutes of conscious    sedation time from 915 to 927.  Two grams of Ancef were given    pre-procedurally via existing IV.       The patient was placed supine on the angiographic table and the left chest    and neck was prepped and covered with a full body drape in the usual    sterile fashion.  All operators present for the case performed standard    pre-procedural prep including hand    washing, sterile gloves, gown, mask, and cap.  All aspects of the maximum    sterile barrier technique were followed.       A preprocedural time out was performed with all physicians, technologists,    and nurses involved with the procedure.   Ultrasound of the right neck demonstrated a severely stenotic right    internal jugular vein.   Sonography of the left  neck demonstrated a patent internal jugular vein    and a permanent image was obtained.  Under sonographic guidance, the left      internal jugular vein was accessed using a micropuncture system.  A    guidewire was advanced into the IVC    under fluoroscopic guidance.  Using blunt dissection a dual lumen tunneled    catheter was placed via a peel-away sheath.  Both lumens flushed and    returned easily.  The catheter was secured and heparinized.  A small    amount of Dermabond was placed at the    neck venotomy site.   Sterile gauze and transparent dressing was applied.       The patient tolerated the procedure well and there were no immediate    complications. Routine post tunneled catheter insertion instructions were    communicated to the patient.       ESTIMATED BLOOD LOSS: Less than 5cc.       FLUORO TIME/DOSE:  0.4 minutes       AIR KERMA:  5.84 mGy        IMPRESSION: Successful placement of left internal jugular vein tunneled    dialysis catheter ( 27 cm tip to cuff Bard HemoSplit catheter)           LOCATION:  [facility]               Dictated by (CST): Duy De La Rosa MD on 4/22/2025 at 10:16 AM        Finalized by (CST): Duy De La Rosa MD on 4/22/2025 at 10:18 AM         IR GRAFT PROCEDURE   Final Result   PROCEDURE:  IR AV GRAFT DECLOT CHECK       INDICATIONS:  Left arm swelling       TECHNIQUE:    Prior to the procedure, I discussed with the patient and/or legal    representative the potential benefits, risks, and side effects of this    procedure, the likelihood of the patient achieving goals; and the    potential problems that might occur during    recuperation.  I discussed reasonable alternatives to the procedure,    including risks, benefits, steps to prevent infection and side effects    related to the alternatives, and risks related to not receiving this    procedure. A witnessed verbal and signed    consent was obtained and documented in the patient's chart.        IV was checked and maintained by the nurse. Moderate conscious sedation    was performed under continuous pulse oximetry and cardiac monitoring under    my direct supervision.  The radiology nurse was in attendance throughout    the exam. Moderate conscious    sedation of 1 mg Versed and 50 mcg of Fentanyl was given.  Patient was    assessed and monitoring of oxygen saturation, heart rate, and blood    pressure by the nursing staff and myself during the exam for a total    intraservice time of 28 minutes of conscious    sedation time from 12:26 p.m. to 12:54 p.m..        The patient was placed supine on the angiographic table and the left arm    was prepped and covered with a full body drape in the usual sterile    fashion.  All operators present for the case performed standard    pre-procedural prep including hand washing,    sterile gloves, gown, mask, and cap.  All aspects of the maximum  sterile    barrier technique were followed.       A preprocedural time out was performed with all physicians, technologists,    and nurses involved with the procedure.       The shunt was accessed with a micropuncture system pointing toward the    venous limb.  Shuntogram and central venograms were obtained.       SHUNTOGRAM:  The shunt was patent.  The axillary, subclavian, innominate    vein and SVC were patent. There was a stenosis within the venous outflow    at the level of the elbow.       Due to tortuosity proximal to the area of narrowing, a balloon catheter    cannot be tracked over the wire to this location.           Catheters were removed and hemostasis was achieved with manual    compression.  The patient tolerated the procedure well without any    immediate procedural complication.       CONTRAST:  50 mL of Isovue-300       ESTIMATED BLOOD LOSS: Less than 5cc.       FLUOROSCOPY TIME/DOSE: 5.3       AIR KERMA: 17.91 mGy                         =====   CONCLUSION:    1. There is a narrowing within the venous outflow at the level the elbow.     Due to tortuosity proximal to this narrowing, a balloon catheter cannot be    advanced into the location in order to angioplasty.  There is good flow    through the fistula noted as    there are numerous collaterals proximal to this narrowing.   2. After discussion with Dr. Coombs, an attempt for dialysis will be made    through the fistula and if unsuccessful a tunneled dialysis catheter will    be placed.         PLAN:  Patient will follow-up with nephrology and at dialysis.       LOCATION:  [facility]                        Dictated by (CST): Quinn Bernal MD on 4/21/2025 at 12:59 PM        Finalized by (CST): Quinn Bernal MD on 4/21/2025 at 1:15 PM         CT CHEST+ABDOMEN+PELVIS(CPT=71250/76368)   Final Result   PROCEDURE:  CT CHEST+ABDOMEN+PELVIS(CPT=71250/17484)       COMPARISON:  [facility], CT, CTA CHEST + CT ABD (W) + CT PEL (W)    SH(CPT=71275/20155),  3/29/2025, 4:34 PM.       INDICATIONS:  abnl cxr, SOB, llq pain       TECHNIQUE:  Following oral contrast administration, unenhanced multislice    CT scanning is performed through the chest, abdomen, and pelvis.  Dose    reduction techniques were used. Dose information is transmitted to the ACR    (American College of Radiology)    NRDR (National Radiology Data Registry) which includes the Dose Index    Registry.       PATIENT STATED HISTORY: (As transcribed by Technologist)  Patient reports    shortness of breath, lower left abdominal pain and generalized weakness.             FINDINGS:         CHEST:     Left atrial enlargement.  No pericardial effusion.  Mitral valve    prosthesis noted.  Mild coronary calcification.  Normal caliber of the    thoracic aorta and pulmonary trunk.  Enlarged lymph nodes of the    paratracheal station are stable, index right lower    paratracheal node measuring 1.6 cm (series 2, image 45).  Normal thyroid    and esophagus.  No central airway stenosis.       Stable right lung volume loss with areas of partial atelectasis or scar of    the right middle and right lower lobes.  Upper lobe predominant emphysema.     Mild mosaic attenuation suggesting mild air trapping.  No acute airspace    disease.  No new or enlarging    pulmonary nodules.  Small right pleural effusion, similar to prior.  No    pneumothorax.       Regional soft tissues are within normal limits.  No fracture or aggressive    osseous lesion.  Suture anchors of the left humeral head noted.       ABDOMEN/PELVIS:   Normal liver morphology.  Parenchymal hyperdensity of the liver may    reflect sequela of amiodarone therapy.  Normal gallbladder, bile ducts,    spleen, pancreas, and adrenal glands.  Bilateral renal cortical atrophy.     Subtle hyperdensity of the right distal    ureter near the UVJ (series 2, image 234, 235) may represent small    ureteral calculi, though there is no evidence of collecting system    obstruction.        Normal stomach.  No evidence of bowel inflammation or obstruction.  Normal    appendix.  Pancolonic diverticulosis without evidence of acute    diverticulitis.       Concentric bladder wall thickening, likely accentuated by under    distention.  Normal prostate and seminal vesicles.       No ascites, pneumoperitoneum, or regional lymphadenopathy.       No abdominal aortic aneurysm.       Regional soft tissues are within normal limits.  Osteoarthritis of the    hips and spine.  No aggressive osseous lesions.                         =====   CONCLUSION:         1. No acute process identified within the chest, abdomen, or pelvis.       2. Scarring and volume loss of the right lung, similar compared to    03/29/2025.  Stable small right pleural effusion.  Stable mediastinal    lymphadenopathy.       3. Punctate hyperdensity of the right distal ureter near the UVJ may    reflect urolithiasis, though without evidence of collecting system    obstruction.       4. Pancolonic diverticulosis without evidence of acute diverticulitis.                     LOCATION:  [facility]               Dictated by (CST): Claude Singh MD on 4/20/2025 at 0:37 AM        Finalized by (CST): Claude Singh MD on 4/20/2025 at 0:46 AM         XR CHEST AP PORTABLE  (CPT=71045)   Final Result   PROCEDURE:  XR CHEST AP PORTABLE  (CPT=71045)       TECHNIQUE:  AP chest radiograph was obtained.       COMPARISON:  [facility], XR, XR CHEST PA + LAT CHEST (DBY=62328),    4/04/2025, 5:25 PM.       INDICATIONS:  shortness of breath, abdominal pain, problem with fistula       PATIENT STATED HISTORY: (As transcribed by Technologist)  Patient has    shortness of breath, dry cough, and chest tightness for around 2 weeks.    Patient stated tonight he started having left sided abdomen pain.             FINDINGS:                           =====   CONCLUSION:  Stable cardiac and mediastinal contours with valvular    prosthesis noted.  There continues to be confluent  opacification of the    right lower lung which likely represents some combination of atelectasis,    airspace disease, and pleural effusion.     No appreciable pneumothorax.           LOCATION:  [facility]                       Dictated by (CST): Claude Singh MD on 4/19/2025 at 11:02 PM        Finalized by (CST): Claude Singh MD on 4/19/2025 at 11:03 PM               ASSESSMENT & PLAN   45 year old male with PMH of ESRD on iHD MWF, HTN, MR s/p MVR, HFpEF, TIA, DVT/PE, MGUS, presents with RAMSAY:     ESRD on HD MWF:  -- [facility] in [place]  -- last HD Friday    AVF malfunction  -- IR fistulagram 4/21/25 noted Mild narrowing at the level of the elbow within the venous outflow. No intervention   -- attempted HD, unable to dialyze, 2 RNs attempted. Pt w pain and swelling.   US 1. Left upper extremity fistula.  Approximately 2.6 cm segment of thrombus within the venous outflow tract the level of the elbow.  This corresponds to the recent fistulagram findings.   2. Arterial venous anastomosis is patent  -- US w thrombus, per vascular no acute intervention. Vascular surgery consulted, defer to them       Anemia in ESRD:  -- below goal will obtain Iron stores     MBD / Hyperphosphatemia:  -- Sevelamer 2400mg PO TID  -- Calcium Acetate 667 TID     HTN:  -- Resume Home medications     RAMSAY:  -- Does not appear to be volume mediated at this time.        plan:   - sp HD tomorrow via perm cath per MWF schedule, UF 3-4L, HD directly managed, 3K  - vascular surgery consult appreciated, d/w Dr Goodwin, defer US findings to vascular, plan to rest arm and see him in office in 2wk for revision  -- iron stores low, gave IV iron  - avoid hypotension  - renally adjust medications  - ok to use Iodinated contrast  - avoid morphine w ESRD        Thank you for allowing me to participate in the care of this patient. Please do not hesitate to call with questions or concerns.       Samaria Nava MD  [facility]         [1]   Current  Facility-Administered Medications   Medication Dose Route Frequency    sodium chloride 0.9 % IV bolus 100 mL  100 mL Intravenous Q30 Min PRN    And    albumin human (Albumin) 25% injection 25 g  25 g Intravenous PRN Dialysis    HYDROmorphone (Dilaudid) 1 MG/ML injection 0.2 mg  0.2 mg Intravenous Q6H PRN    Or    HYDROmorphone (Dilaudid) 1 MG/ML injection 0.4 mg  0.4 mg Intravenous Q6H PRN    Or    HYDROmorphone (Dilaudid) 1 MG/ML injection 0.2 mg  0.2 mg Intravenous Q6H PRN    acetaminophen (Tylenol Extra Strength) tab 500 mg  500 mg Oral Q4H PRN    traMADol (Ultram) tab 50 mg  50 mg Oral Q12H PRN    ARIPiprazole (Abilify) tab 10 mg  10 mg Oral Daily    ARIPiprazole (Abilify) tab 5 mg  5 mg Oral Daily    atorvastatin (Lipitor) tab 20 mg  20 mg Oral QPM    buPROPion ER (Wellbutrin XL) 24 hr tab 150 mg  150 mg Oral Daily    calcium acetate (Phoslo) cap 667 mg  667 mg Oral TID with meals    carvedilol (Coreg) tab 25 mg  25 mg Oral BID with meals    cloNIDine (Catapres) tab 0.1 mg  0.1 mg Oral BID    FLUoxetine (PROzac) cap 40 mg  40 mg Oral Daily    gabapentin (Neurontin) cap 200 mg  200 mg Oral TID    hydrALAZINE (Apresoline) tab 25 mg  25 mg Oral TID    lamoTRIgine (LaMICtal) tab 100 mg  100 mg Oral Daily    losartan (Cozaar) tab 100 mg  100 mg Oral Daily    NIFEdipine ER (Procardia-XL) 24 hr tab 30 mg  30 mg Oral Daily    pantoprazole (Protonix) DR tab 40 mg  40 mg Oral QAM AC    sevelamer carbonate (Renvela) tab 2,400 mg  2,400 mg Oral TID CC    tamsulosin (Flomax) cap 0.4 mg  0.4 mg Oral Daily    amphetamine-dextroamphetamine (Adderall) tab 10 mg  10 mg Oral BID@0800,1200    HYDROcodone-acetaminophen (Norco)  MG per tab 1 tablet  1 tablet Oral Q6H PRN    cyclobenzaprine (Flexeril) tab 5 mg  5 mg Oral TID PRN    fluticasone propionate (Flonase) 50 MCG/ACT nasal suspension 2 spray  2 spray Each Nare Daily    heparin (Porcine) 5000 UNIT/ML injection 5,000 Units  5,000 Units Subcutaneous Q8H MARTHA    polyethylene  glycol (PEG 3350) (Miralax) 17 g oral packet 17 g  17 g Oral Daily PRN    sennosides (Senokot) tab 17.2 mg  17.2 mg Oral Nightly PRN    bisacodyl (Dulcolax) 10 MG rectal suppository 10 mg  10 mg Rectal Daily PRN    ondansetron (Zofran) 4 MG/2ML injection 4 mg  4 mg Intravenous Q6H PRN    prochlorperazine (Compazine) 10 MG/2ML injection 5 mg  5 mg Intravenous Q8H PRN    umeclidinium bromide (Incruse Ellipta) 62.5 MCG/ACT inhaler 1 puff  1 puff Inhalation Daily    hydrALAzine (Apresoline) 20 mg/mL injection 10 mg  10 mg Intravenous Q4H PRN    albuterol (Ventolin HFA) 108 (90 Base) MCG/ACT inhaler 2 puff  2 puff Inhalation Q6H PRN

## 2025-04-24 NOTE — PLAN OF CARE
Assumed care around 0730. A/O x4. RA. Tele NSR.  PRN dilaudid and tramadol for pain.  Left internal jugular permacath for HD: MWF. Last dialysis on 04/23/25.  All safety precautions met.    Problem: PAIN - ADULT  Goal: Verbalizes/displays adequate comfort level or patient's stated pain goal  Description: INTERVENTIONS:- Encourage pt to monitor pain and request assistance- Assess pain using appropriate pain scale- Administer analgesics based on type and severity of pain and evaluate response- Implement non-pharmacological measures as appropriate and evaluate response- Consider cultural and social influences on pain and pain management- Manage/alleviate anxiety- Utilize distraction and/or relaxation techniques- Monitor for opioid side effects- Notify MD/LIP if interventions unsuccessful or patient reports new pain- Anticipate increased pain with activity and pre-medicate as appropriate  Outcome: Progressing     Problem: CARDIOVASCULAR - ADULT  Goal: Maintains optimal cardiac output and hemodynamic stability  Description: INTERVENTIONS:- Monitor vital signs, rhythm, and trends- Monitor for bleeding, hypotension and signs of decreased cardiac output- Evaluate effectiveness of vasoactive medications to optimize hemodynamic stability- Monitor arterial and/or venous puncture sites for bleeding and/or hematoma- Assess quality of pulses, skin color and temperature- Assess for signs of decreased coronary artery perfusion - ex. Angina- Evaluate fluid balance, assess for edema, trend weights  Outcome: Progressing  Goal: Absence of cardiac arrhythmias or at baseline  Description: INTERVENTIONS:- Continuous cardiac monitoring, monitor vital signs, obtain 12 lead EKG if indicated- Evaluate effectiveness of antiarrhythmic and heart rate control medications as ordered- Initiate emergency measures for life threatening arrhythmias- Monitor electrolytes and administer replacement therapy as ordered  Outcome: Progressing

## 2025-04-24 NOTE — PROGRESS NOTES
[facility] Hospitalist Progress Note     PCP: Adrian Small MD    Chief Complaint: follow-up   Follow up for: The encounter diagnosis was RAMSAY (dyspnea on exertion).    Overnight/Interim Events:      SUBJECTIVE:  Pain in LUE/fistula site. On 4 L O2 NC this morning, attempting to wean.       OBJECTIVE:  Temp:  [98.1 °F (36.7 °C)-98.6 °F (37 °C)] 98.6 °F (37 °C)  Pulse:  [74-85] 85  Resp:  [12-18] 18  BP: ()/(61-83) 119/83  SpO2:  [93 %-99 %] 93 %    Intake/Output:  No intake or output data in the 24 hours ending 04/24/25 1705      Last 3 Weights   04/20/25 0452 229 lb 14.4 oz (104.3 kg)   04/19/25 2126 240 lb (108.9 kg)   04/05/25 1044 240 lb (108.9 kg)   04/04/25 1532 247 lb 2.2 oz (112.1 kg)       Exam    Throat: Lips, mucosa, and tongue normal.     Neck: Supple, symmetrical, trachea midline,   Lungs:   Clear to auscultation bilaterally. Normal effort   Chest wall:  No tenderness or deformity.   Heart:  Regular rate and rhythm, S1, S2 normal,    Abdomen:   Soft, non-tender. Bowel sounds normal.  Non distended   Extremities: Extremities normal, atraumatic, left arm with fistula, with slightly more swelling than the right, no erythema, drainage appreciated, no warmth or signs of infection noted externally.  Patient endorses pain with deep palpation, some even at rest.  Fistula has good thrill   Skin: Skin color, texture, turgor normal. No rashes or lesions.    Neurologic: Normal strength, no focal deficit appreciated      Data Review:       Labs:     Recent Labs   Lab 04/19/25  2208 04/21/25  0642 04/22/25  0637 04/23/25  0756   WBC 6.0 4.5 5.0 5.0   HGB 8.4* 8.0* 7.9* 8.2*   MCV 94.6 93.5 91.6 94.2   .0 188.0 173.0 188.0       Recent Labs   Lab 04/20/25  0658 04/21/25  0642 04/22/25  0637 04/23/25  0756 04/24/25  0738    141 142 142 140   K 3.7 3.6 4.1 3.7 4.0    103 103 102 101   CO2 26.0 25.0 24.0 28.0 26.0   BUN 53* 63* 71* 45* 34*   CREATSERUM 8.84* 10.21* 10.59* 8.05* 7.14*   CA 9.4 8.8 8.5*  9.0 9.7   MG  --  2.2 2.4 2.1 2.2   PHOS 6.2* 6.5* 7.0* 5.8* 6.4*   * 108* 114* 107* 114*       Recent Labs   Lab 04/19/25  2208 04/20/25  0658 04/21/25  0642 04/22/25  0637 04/23/25  0756 04/24/25  0738   ALT 84*  --   --   --   --   --    AST 53*  --   --   --   --   --    ALB 4.1 4.1 3.9 3.8 4.1 4.3       No results for input(s): \"PGLU\" in the last 168 hours.    No results for input(s): \"TROP\" in the last 168 hours.      Meds:     Scheduled Medications[1]  Medication Infusions[2]  PRN Medications[3]       Assessment/Plan:     Patient is a 45 year old male with PMH sig for ESRD on MWF HD, hemorrhoids, diverticulosis, hypertension, bipolar disorder, history of frequent readmissions who presented for evaluation of shortness of breath.      Shortness of breath  ESRD on HD MWF  Hypertension  -CT chest abdomen pelvis obtained in the ER, not suggestive of fluid overload, patient clinically on room air laying flat  - No urgent indication for dialysis, was discussed with nephrology; plan for HD today, MWF schedule   - Patient has had evaluation per cardiology 3 times in the last month, initially holding cardiology eval as was chest pain-free  -Resume home nifedipine losartan hydralazine Coreg   -Tunneled dialysis catheter placement 4/22       Rule out left fistula infection  - Clinically does not appear erythematous warm, has a good thrill but patient endorses pain and noted to have some swelling  - Was discussed with  - vascular surgery-had a recent ultrasound in the outpatient setting and was noted to be normal, holding further imaging until seen by vascular surgery.  -Holding IV antibiotics at this time as concern for infection is low given lack of symptoms  -Was discussed with Dr. Jovel  - marlen norco prn for pain, no indication for IV pain meds at this time. Concern for dependence. Tramadol added as states in pain. Trying to maintain on orals, avoid morphine given ESRD  - pt states [facility] placed  fistula  - Left arm fistulogram c mild narrowing at the level of the elbow within the venous outflow   -Attempted HD 4/21 night but was unable to start HD. Per RN had clots coming from arterial but venous was ok.   -Discussed w/ vascular, left upper extremity arterial duplex ultrasound shows 2.6 cm of thrombus within the venous outflow tract, no need for any vascular invention per vascular.  Follow-up with Dr. Coombs in 2 weeks.     Bipolar disorder  - Continue bupropion and Lamictal     HFpEF, chronic  - Volume status with dialysis    CP  Chronically elevated trops   - doesn't seem significant but reports pressure on chest, \"someone sitting on him\"; usually volume issues but does have cardiac hx, will notify  - trop/EKG      FN:  - IVF:none  - Diet: cardiac     DVT Prophy: scd heparin subcu  Atrophy: ambulate as mary  Lines: piv     Dispo: Possible dc later today, wean o2     Questions/concerns were discussed with patient by bedside. D/w RN. [facility] hospitalist to resume care in AM, will discuss plan with them      Total Time spent with patient and coordinating care:  36 minutes    DO Kevin Roy [facility]  Hospitalist  Contact via [email or web address]             [1]    ARIPiprazole  10 mg Oral Daily    ARIPiprazole  5 mg Oral Daily    atorvastatin  20 mg Oral QPM    buPROPion ER  150 mg Oral Daily    calcium acetate  667 mg Oral TID with meals    carvedilol  25 mg Oral BID with meals    cloNIDine  0.1 mg Oral BID    FLUoxetine HCl  40 mg Oral Daily    gabapentin  200 mg Oral TID    hydrALAZINE  25 mg Oral TID    lamoTRIgine  100 mg Oral Daily    losartan  100 mg Oral Daily    NIFEdipine ER  30 mg Oral Daily    pantoprazole  40 mg Oral QAM AC    sevelamer carbonate  2,400 mg Oral TID CC    tamsulosin  0.4 mg Oral Daily    amphetamine-dextroamphetamine  10 mg Oral BID@0800,1200    fluticasone propionate  2 spray Each Nare Daily    heparin  5,000 Units Subcutaneous Q8H MARTHA    umeclidinium bromide  1 puff  Inhalation Daily   [2] [3]   sodium chloride **AND** albumin human    [DISCONTINUED] HYDROmorphone **OR** HYDROmorphone **OR** HYDROmorphone **OR** HYDROmorphone    acetaminophen    traMADol    HYDROcodone-acetaminophen    cyclobenzaprine    polyethylene glycol (PEG 3350)    sennosides    bisacodyl    ondansetron    prochlorperazine    hydrALAzine    albuterol

## 2025-04-24 NOTE — PROGRESS NOTES
[facility] Cardiology  Progress Note    Godwin Fonseca Patient Status:  Observation    1980 MRN [id number]   Location [facility] 3NE-A Attending Binu Singh, DO   Hosp Day # 1 PCP Adrian Small MD       Impression:  1. long-standing HTN complicated by ESRD on HD--> unable to dialyze through AV fistula, uncontrolled BP as a result--> now s/p perm cath placement 25.     2. chronically elevated troponin, in setting of ESRD/HTN, usually in 200-500s range--> 282, ECG SR with non-spec repolarization changes     3. hx MV repair--> TTE (3/25): LVEF 60-65%, s/p MV repair- 6mmHg gradient, mod MR     4. chronic anemia, likely AoCD/ESRD     Recommendations:  - chronic persistent CP/SOB and chronically elevated HS troponin in setting of ESRD, chronic HTN.  Reviewed prior LHC  (similar symptoms/cardiac enzymes)- no significant CAD.  CAD related ischemic disease unlikely- stress testing/LHC likely low yield.  Will defer.    - follow telemetry  - HD schedule per nephrology. vascular- plan for AVF revision 2 weeks.      Subjective:  no acute issues or new symptoms today.      Objective:  /67 (BP Location: Right arm)   Pulse 77   Temp 98.3 °F (36.8 °C) (Oral)   Resp 15   Ht 6' 2\" (1.88 m)   Wt 229 lb 14.4 oz (104.3 kg)   SpO2 98%   BMI 29.52 kg/m²   Temp (24hrs), Av.2 °F (36.8 °C), Min:98.1 °F (36.7 °C), Max:98.3 °F (36.8 °C)    No intake or output data in the 24 hours ending 25 1537    Wt Readings from Last 3 Encounters:   25 229 lb 14.4 oz (104.3 kg)   25 240 lb (108.9 kg)   25 247 lb 2.2 oz (112.1 kg)       General: Awake and alert; in no acute distress  Cardiac: Regular rate and regular rhythm; no murmurs/rubs/gallops are appreciated  Lungs: Clear to auscultation bilaterally; no accessory muscle use  Abdomen: Soft, non-tender; bowel sounds are normoactive  Extremities: No clubbing/cyanosis; moves all 4 extremities normally    Current Hospital  Medications[1]    Laboratory Data:       Lab Results   Component Value Date    INR 1.08 03/29/2025    INR 1.07 03/08/2025    INR 1.08 02/03/2025     Lab Results   Component Value Date     04/24/2025    K 4.0 04/24/2025     04/24/2025    CO2 26.0 04/24/2025    BUN 34 04/24/2025    CREATSERUM 7.14 04/24/2025     04/24/2025    CA 9.7 04/24/2025    MG 2.2 04/24/2025       Telemetry: No malignant tachyarrhythmias or bradyarrhythmias      Thank you for allowing our practice to participate in the care of your patient. Please do not hesitate to contact me if you have any questions.             [1]   Current Facility-Administered Medications   Medication Dose Route Frequency    sodium chloride 0.9 % IV bolus 100 mL  100 mL Intravenous Q30 Min PRN    And    albumin human (Albumin) 25% injection 25 g  25 g Intravenous PRN Dialysis    HYDROmorphone (Dilaudid) 1 MG/ML injection 0.2 mg  0.2 mg Intravenous Q6H PRN    Or    HYDROmorphone (Dilaudid) 1 MG/ML injection 0.4 mg  0.4 mg Intravenous Q6H PRN    Or    HYDROmorphone (Dilaudid) 1 MG/ML injection 0.2 mg  0.2 mg Intravenous Q6H PRN    acetaminophen (Tylenol Extra Strength) tab 500 mg  500 mg Oral Q4H PRN    traMADol (Ultram) tab 50 mg  50 mg Oral Q12H PRN    ARIPiprazole (Abilify) tab 10 mg  10 mg Oral Daily    ARIPiprazole (Abilify) tab 5 mg  5 mg Oral Daily    atorvastatin (Lipitor) tab 20 mg  20 mg Oral QPM    buPROPion ER (Wellbutrin XL) 24 hr tab 150 mg  150 mg Oral Daily    calcium acetate (Phoslo) cap 667 mg  667 mg Oral TID with meals    carvedilol (Coreg) tab 25 mg  25 mg Oral BID with meals    cloNIDine (Catapres) tab 0.1 mg  0.1 mg Oral BID    FLUoxetine (PROzac) cap 40 mg  40 mg Oral Daily    gabapentin (Neurontin) cap 200 mg  200 mg Oral TID    hydrALAZINE (Apresoline) tab 25 mg  25 mg Oral TID    lamoTRIgine (LaMICtal) tab 100 mg  100 mg Oral Daily    losartan (Cozaar) tab 100 mg  100 mg Oral Daily    NIFEdipine ER (Procardia-XL) 24 hr tab 30 mg   30 mg Oral Daily    pantoprazole (Protonix) DR tab 40 mg  40 mg Oral QAM AC    sevelamer carbonate (Renvela) tab 2,400 mg  2,400 mg Oral TID CC    tamsulosin (Flomax) cap 0.4 mg  0.4 mg Oral Daily    amphetamine-dextroamphetamine (Adderall) tab 10 mg  10 mg Oral BID@0800,1200    HYDROcodone-acetaminophen (Norco)  MG per tab 1 tablet  1 tablet Oral Q6H PRN    cyclobenzaprine (Flexeril) tab 5 mg  5 mg Oral TID PRN    fluticasone propionate (Flonase) 50 MCG/ACT nasal suspension 2 spray  2 spray Each Nare Daily    heparin (Porcine) 5000 UNIT/ML injection 5,000 Units  5,000 Units Subcutaneous Q8H MARTHA    polyethylene glycol (PEG 3350) (Miralax) 17 g oral packet 17 g  17 g Oral Daily PRN    sennosides (Senokot) tab 17.2 mg  17.2 mg Oral Nightly PRN    bisacodyl (Dulcolax) 10 MG rectal suppository 10 mg  10 mg Rectal Daily PRN    ondansetron (Zofran) 4 MG/2ML injection 4 mg  4 mg Intravenous Q6H PRN    prochlorperazine (Compazine) 10 MG/2ML injection 5 mg  5 mg Intravenous Q8H PRN    umeclidinium bromide (Incruse Ellipta) 62.5 MCG/ACT inhaler 1 puff  1 puff Inhalation Daily    hydrALAzine (Apresoline) 20 mg/mL injection 10 mg  10 mg Intravenous Q4H PRN    albuterol (Ventolin HFA) 108 (90 Base) MCG/ACT inhaler 2 puff  2 puff Inhalation Q6H PRN

## 2025-04-24 NOTE — PLAN OF CARE
Problem: CARDIOVASCULAR - ADULT  Goal: Maintains optimal cardiac output and hemodynamic stability  Description: INTERVENTIONS:- Monitor vital signs, rhythm, and trends- Monitor for bleeding, hypotension and signs of decreased cardiac output- Evaluate effectiveness of vasoactive medications to optimize hemodynamic stability- Monitor arterial and/or venous puncture sites for bleeding and/or hematoma- Assess quality of pulses, skin color and temperature- Assess for signs of decreased coronary artery perfusion - ex. Angina- Evaluate fluid balance, assess for edema, trend weights  Outcome: Progressing

## 2025-04-25 LAB
ATRIAL RATE: 76 BPM
P AXIS: 45 DEGREES
P-R INTERVAL: 212 MS
Q-T INTERVAL: 440 MS
QRS DURATION: 106 MS
QTC CALCULATION (BEZET): 495 MS
R AXIS: 46 DEGREES
T AXIS: 75 DEGREES
VENTRICULAR RATE: 76 BPM

## 2025-04-25 NOTE — PROGRESS NOTES
NURSING DISCHARGE NOTE    Discharge AVS reviewed with patient  Peripheral IV and tele monitoring removed  All patient belongings sent home with patient.

## 2025-04-25 NOTE — DISCHARGE SUMMARY
General Medicine Discharge Summary     Patient ID:  Godwin Fonseca  45 year old  4/12/1980    Admit date: 4/19/2025    Discharge date and time: 4/24/2025  8:37 PM     Attending Physician: No att. providers found     Consults: IP CONSULT TO NEPHROLOGY  NURSING CONSULT TO DIETITIAN  IP CONSULT TO VASCULAR SURGERY    Primary Care Physician: Adrian Small MD     Reason for admission: SOB    Risk For Readmission: Moderate    Discharge Diagnoses: RAMSAY (dyspnea on exertion) [R06.09]  See Additional Discharge Diagnoses in Hospital Course    Discharged Condition: fair    Follow-up with labs/images appointments: Vascular, PCP, nephrology    Exam   Gen: Alert, no acute distress  Heent: Normocephalic, atraumatic, neck supple, EOMI, PERRLA  Pulm: Lungs CTAB, normal respiratory effort  CV:  Regular rate and rhythm, no murmurs/rubs/gallops  Abd: Soft, nontender, nondistended, bowel sounds present  Extremities: LUE erythema and swelling, no clubbing, pulses intact   Skin: No rashes or lesions  Neuro: AOx3, no focal neurologic deficits, CN II-XII grossly intact  Psych: appropriate mood and affect    Hospital Course: Patient is a 45 year old male with PMH sig for ESRD on MWF HD, hemorrhoids, diverticulosis, hypertension, bipolar disorder, history of frequent readmissions who presented for evaluation of shortness of breath.  CT imaging upon arrival to the ED not suggestive of any fluid overload.  No indication for urgent dialysis, per nephrology patient was to continue MWF schedule. SOB improved, main reason for admission was patient's left arm swelling and erythema around site of fistula, had good thrill however patient noted pain and swelling.  Vascular surgery consulted, fistulogram on 4/21 showed mild narrowing at level of elbow with an venous outflow, unable to achieve dialysis through this line.  As result, tunneled dialysis catheter was placed on 4/22 and dialysis was initiated through HD line.  Left upper extremity arterial  duplex ultrasound showed 2.6 cm thrombus within venous outflow tract.  Discussed with vascular, no plans for any immediate vascular invention at this time and patient will follow-up with Dr. Coombs in 2 weeks for further evaluation.  Patient otherwise was discharged at this time, seen by cardiology, nephrology as well with no other interventions indicated.    Operative Procedures:      Imaging: US ARTERIAL DUPLEX UPPER EXTREMITY LEFT (CPT=93931)  Result Date: 4/23/2025  CONCLUSION:  1. Left upper extremity fistula.  Approximately 2.6 cm segment of thrombus within the venous outflow tract the level of the elbow.  This corresponds to the recent fistulagram findings. 2. Arterial venous anastomosis is patent.   LOCATION:  [facility]    Dictated by (CST): Duy De La Rosa MD on 4/23/2025 at 3:25 PM     Finalized by (CST): Duy De La Rosa MD on 4/23/2025 at 3:29 PM       IR CENTRAL VENOUS ACCESS  Result Date: 4/22/2025  PROCEDURE:  IR PERMANENT CATHETER INSERTION EXCHNGE CHECK  INDICATIONS:  ESRD  TECHNIQUE:  Prior to the procedure, I discussed with the patient and/or legal representative the potential benefits, risks, and side effects of this procedure, the likelihood of the patient achieving goals; and the potential problems that might occur during recuperation.  I discussed reasonable alternatives to the procedure, including risks, benefits, steps to prevent infection and side effects related to the alternatives, and risks related to not receiving this procedure. A witnessed verbal and signed consent was obtained and documented in the patient's chart.  IV was checked and maintained by the nurse. Moderate conscious sedation was performed under continuous pulse oximetry and cardiac monitoring under my direct supervision.  The radiology nurse was in attendance throughout the exam. Moderate conscious sedation of 2 mg Versed and 100 mcg of Fentanyl was given.  Patient was assessed and monitoring of oxygen saturation, heart rate,  and blood pressure by the nursing staff and myself during the exam for a total intraservice time of 12 minutes of conscious sedation time from 915 to 927.  Two grams of Ancef were given pre-procedurally via existing IV.  The patient was placed supine on the angiographic table and the left chest and neck was prepped and covered with a full body drape in the usual sterile fashion.  All operators present for the case performed standard pre-procedural prep including hand washing, sterile gloves, gown, mask, and cap.  All aspects of the maximum sterile barrier technique were followed.  A preprocedural time out was performed with all physicians, technologists, and nurses involved with the procedure. Ultrasound of the right neck demonstrated a severely stenotic right internal jugular vein. Sonography of the left  neck demonstrated a patent internal jugular vein and a permanent image was obtained.  Under sonographic guidance, the left   internal jugular vein was accessed using a micropuncture system.  A guidewire was advanced into the IVC under fluoroscopic guidance.  Using blunt dissection a dual lumen tunneled catheter was placed via a peel-away sheath.  Both lumens flushed and returned easily.  The catheter was secured and heparinized.  A small amount of Dermabond was placed at the neck venotomy site.   Sterile gauze and transparent dressing was applied.  The patient tolerated the procedure well and there were no immediate complications. Routine post tunneled catheter insertion instructions were communicated to the patient.  ESTIMATED BLOOD LOSS: Less than 5cc.  FLUORO TIME/DOSE:  0.4 minutes  AIR KERMA:  5.84 mGy  IMPRESSION: Successful placement of left internal jugular vein tunneled dialysis catheter ( 27 cm tip to cuff Bard HemoSplit catheter)   LOCATION:  [place]    Dictated by (CST): Duy De La Rosa MD on 4/22/2025 at 10:16 AM     Finalized by (CST): Duy De La Rosa MD on 4/22/2025 at 10:18 AM           Disposition:  home    Home Medication Changes:     Med list     Medication List        CONTINUE taking these medications      albuterol 108 (90 Base) MCG/ACT Aers  Commonly known as: Ventolin HFA     * ARIPiprazole 5 MG Tabs  Commonly known as: Abilify     * ARIPiprazole 10 MG Tabs  Commonly known as: Abilify     atorvastatin 20 MG Tabs  Commonly known as: Lipitor     benzonatate 200 MG Caps  Commonly known as: Tessalon  Take 1 capsule (200 mg total) by mouth 3 (three) times daily as needed for cough.     buPROPion  MG Tb24  Commonly known as: Wellbutrin XL     calcium acetate 667 MG Caps  Commonly known as: Phoslo  Take 1 capsule (667 mg total) by mouth with breakfast, with lunch, and with evening meal.     carvedilol 25 MG Tabs  Commonly known as: Coreg  Take 1 tablet (25 mg total) by mouth in the morning and 1 tablet (25 mg total) in the evening. Take with meals.     cloNIDine 0.1 MG Tabs  Commonly known as: Catapres  Take 1 tablet (0.1 mg total) by mouth 2 (two) times daily.     cyclobenzaprine 5 MG Tabs  Commonly known as: Flexeril     ergocalciferol 1.25 MG (69881 UT) Caps  Commonly known as: Vitamin D2     FLUoxetine HCl 40 MG Caps  Commonly known as: PROZAC  Take 1 capsule (40 mg total) by mouth daily.     fluticasone propionate 50 MCG/ACT Susp  Commonly known as: Flonase  SPRAY ONCE INTO EACH NOSTRIL BID PRN     gabapentin 100 MG Caps  Commonly known as: Neurontin  Take 2 capsules (200 mg total) by mouth 3 (three) times daily.     hydrALAZINE 25 MG Tabs  Commonly known as: Apresoline     * HYDROcodone-acetaminophen  MG Tabs  Commonly known as: Norco     * HYDROcodone-acetaminophen 5-325 MG Tabs  Commonly known as: Norco     lamoTRIgine 100 MG Tabs  Commonly known as: LaMICtal     losartan 100 MG Tabs  Commonly known as: Cozaar     NIFEdipine ER 30 MG Tb24  Commonly known as: Adalat CC  Take 1 tablet (30 mg total) by mouth daily.     ondansetron 4 MG Tbdp  Commonly known as: Zofran-ODT     pantoprazole 40 MG  Tbec  Commonly known as: Protonix     Polyethylene Glycol 3350 17 g Pack  Commonly known as: MIRALAX     Preparation H 0.25 % Supp  Generic drug: Phenylephrine HCl  Place 1 suppository rectally 4 (four) times daily as needed (Hemorrhoids after bowel movements).     sevelamer carbonate 800 MG Tabs  Commonly known as: Renvela  Take 3 tablets (2,400 mg total) by mouth 3 (three) times daily with meals.     tamsulosin 0.4 MG Caps  Commonly known as: Flomax     tiotropium 18 MCG Caps  Commonly known as: Spiriva Handihaler     Vyvanse 60 MG Caps  Generic drug: Lisdexamfetamine Dimesylate           * This list has 4 medication(s) that are the same as other medications prescribed for you. Read the directions carefully, and ask your doctor or other care provider to review them with you.                STOP taking these medications      hydrocortisone 25 MG Supp  Commonly known as: Anusol-HC              FU   Follow-up Information       Albert Coombs MD Follow up in 2 week(s).    Specialty: SURGERY, VASCULAR  Contact information:  [address] [address] [phone]               Adrian Arce MD Follow up in 1 week(s).    Specialty: Internal Medicine  Contact information:  [facility] [facility] [address] [phone]                             NC instructions:      Other Discharge Instructions:         Continue outpatient dialysis M,W,F        I reconciled current and discharge medications on day of discharge, discussed changes with patient and noted changes above.       Total Time Coordinating Care: 36 minutes.     Patient had opportunity to ask questions and state understanding and agreement with therapeutic plan as outlined.    DO Kevin Roy [facility]  Hospitalist  Contact via [employer]

## 2025-04-27 ENCOUNTER — APPOINTMENT (OUTPATIENT)
Dept: GENERAL RADIOLOGY | Age: 45
End: 2025-04-27
Attending: EMERGENCY MEDICINE
Payer: MEDICARE

## 2025-04-27 ENCOUNTER — HOSPITAL ENCOUNTER (OUTPATIENT)
Facility: HOSPITAL | Age: 45
Setting detail: OBSERVATION
Discharge: HOME | End: 2025-04-30
Attending: EMERGENCY MEDICINE | Admitting: STUDENT IN AN ORGANIZED HEALTH CARE EDUCATION/TRAINING PROGRAM
Payer: MEDICARE

## 2025-04-27 DIAGNOSIS — R07.89 LEFT-SIDED CHEST WALL PAIN: Primary | ICD-10-CM

## 2025-04-27 LAB
ANION GAP SERPL CALC-SCNC: 9 MMOL/L (ref 0–18)
BASOPHILS # BLD AUTO: 0.08 X10(3) UL (ref 0–0.2)
BASOPHILS NFR BLD AUTO: 1.3 %
BUN BLD-MCNC: 70 MG/DL (ref 9–23)
CALCIUM BLD-MCNC: 9.1 MG/DL (ref 8.7–10.6)
CHLORIDE SERPL-SCNC: 105 MMOL/L (ref 98–112)
CO2 SERPL-SCNC: 23 MMOL/L (ref 21–32)
CREAT BLD-MCNC: 10.47 MG/DL (ref 0.7–1.3)
EGFRCR SERPLBLD CKD-EPI 2021: 6 ML/MIN/1.73M2 (ref 60–?)
EOSINOPHIL # BLD AUTO: 0.33 X10(3) UL (ref 0–0.7)
EOSINOPHIL NFR BLD AUTO: 5.4 %
ERYTHROCYTE [DISTWIDTH] IN BLOOD BY AUTOMATED COUNT: 12.8 %
GLUCOSE BLD-MCNC: 74 MG/DL (ref 70–99)
HCT VFR BLD AUTO: 23.6 % (ref 39–53)
HGB BLD-MCNC: 8.3 G/DL (ref 13–17.5)
IMM GRANULOCYTES # BLD AUTO: 0.01 X10(3) UL (ref 0–1)
IMM GRANULOCYTES NFR BLD: 0.2 %
LYMPHOCYTES # BLD AUTO: 1.61 X10(3) UL (ref 1–4)
LYMPHOCYTES NFR BLD AUTO: 26.4 %
MCH RBC QN AUTO: 32 PG (ref 26–34)
MCHC RBC AUTO-ENTMCNC: 35.2 G/DL (ref 31–37)
MCV RBC AUTO: 91.1 FL (ref 80–100)
MONOCYTES # BLD AUTO: 0.76 X10(3) UL (ref 0.1–1)
MONOCYTES NFR BLD AUTO: 12.4 %
NEUTROPHILS # BLD AUTO: 3.32 X10 (3) UL (ref 1.5–7.7)
NEUTROPHILS # BLD AUTO: 3.32 X10(3) UL (ref 1.5–7.7)
NEUTROPHILS NFR BLD AUTO: 54.3 %
OSMOLALITY SERPL CALC.SUM OF ELEC: 303 MOSM/KG (ref 275–295)
PLATELET # BLD AUTO: 167 10(3)UL (ref 150–450)
POTASSIUM SERPL-SCNC: 4.2 MMOL/L (ref 3.5–5.1)
RBC # BLD AUTO: 2.59 X10(6)UL (ref 4.3–5.7)
SODIUM SERPL-SCNC: 137 MMOL/L (ref 136–145)
WBC # BLD AUTO: 6.1 X10(3) UL (ref 4–11)

## 2025-04-27 PROCEDURE — 87040 BLOOD CULTURE FOR BACTERIA: CPT | Performed by: INTERNAL MEDICINE

## 2025-04-27 PROCEDURE — 96374 THER/PROPH/DIAG INJ IV PUSH: CPT

## 2025-04-27 PROCEDURE — 96375 TX/PRO/DX INJ NEW DRUG ADDON: CPT

## 2025-04-27 PROCEDURE — 71045 X-RAY EXAM CHEST 1 VIEW: CPT | Performed by: EMERGENCY MEDICINE

## 2025-04-27 PROCEDURE — 96372 THER/PROPH/DIAG INJ SC/IM: CPT

## 2025-04-27 PROCEDURE — 99285 EMERGENCY DEPT VISIT HI MDM: CPT

## 2025-04-27 PROCEDURE — 85025 COMPLETE CBC W/AUTO DIFF WBC: CPT | Performed by: EMERGENCY MEDICINE

## 2025-04-27 PROCEDURE — 80048 BASIC METABOLIC PNL TOTAL CA: CPT | Performed by: EMERGENCY MEDICINE

## 2025-04-27 RX ORDER — PANTOPRAZOLE SODIUM 40 MG/1
40 TABLET, DELAYED RELEASE ORAL
Status: DISCONTINUED | OUTPATIENT
Start: 2025-04-27 | End: 2025-04-30

## 2025-04-27 RX ORDER — HYDROMORPHONE HYDROCHLORIDE 1 MG/ML
0.5 INJECTION, SOLUTION INTRAMUSCULAR; INTRAVENOUS; SUBCUTANEOUS ONCE
Refills: 0 | Status: COMPLETED | OUTPATIENT
Start: 2025-04-27 | End: 2025-04-27

## 2025-04-27 RX ORDER — HYDRALAZINE HYDROCHLORIDE 25 MG/1
25 TABLET, FILM COATED ORAL 3 TIMES DAILY
Status: DISCONTINUED | OUTPATIENT
Start: 2025-04-27 | End: 2025-04-30

## 2025-04-27 RX ORDER — LOSARTAN POTASSIUM 100 MG/1
100 TABLET ORAL DAILY
Status: DISCONTINUED | OUTPATIENT
Start: 2025-04-27 | End: 2025-04-30

## 2025-04-27 RX ORDER — HEPARIN SODIUM 1000 [USP'U]/ML
1.5 INJECTION, SOLUTION INTRAVENOUS; SUBCUTANEOUS
Status: COMPLETED | OUTPATIENT
Start: 2025-04-28 | End: 2025-04-28

## 2025-04-27 RX ORDER — HYDROMORPHONE HYDROCHLORIDE 1 MG/ML
INJECTION, SOLUTION INTRAMUSCULAR; INTRAVENOUS; SUBCUTANEOUS
Status: DISPENSED
Start: 2025-04-27 | End: 2025-04-28

## 2025-04-27 RX ORDER — HYDROMORPHONE HYDROCHLORIDE 1 MG/ML
0.5 INJECTION, SOLUTION INTRAMUSCULAR; INTRAVENOUS; SUBCUTANEOUS ONCE
Refills: 0 | Status: DISCONTINUED | OUTPATIENT
Start: 2025-04-27 | End: 2025-04-30

## 2025-04-27 RX ORDER — HEPARIN SODIUM 5000 [USP'U]/ML
5000 INJECTION, SOLUTION INTRAVENOUS; SUBCUTANEOUS EVERY 8 HOURS SCHEDULED
Status: DISCONTINUED | OUTPATIENT
Start: 2025-04-27 | End: 2025-04-30

## 2025-04-27 RX ORDER — ALBUTEROL SULFATE 90 UG/1
2 INHALANT RESPIRATORY (INHALATION) EVERY 6 HOURS PRN
Status: DISCONTINUED | OUTPATIENT
Start: 2025-04-27 | End: 2025-04-30

## 2025-04-27 RX ORDER — ALBUMIN (HUMAN) 12.5 G/50ML
25 SOLUTION INTRAVENOUS
Status: ACTIVE | OUTPATIENT
Start: 2025-04-27 | End: 2025-04-29

## 2025-04-27 RX ORDER — BUPROPION HYDROCHLORIDE 150 MG/1
150 TABLET ORAL DAILY
Status: DISCONTINUED | OUTPATIENT
Start: 2025-04-27 | End: 2025-04-30

## 2025-04-27 RX ORDER — ATORVASTATIN CALCIUM 20 MG/1
20 TABLET, FILM COATED ORAL EVERY EVENING
Status: DISCONTINUED | OUTPATIENT
Start: 2025-04-27 | End: 2025-04-30

## 2025-04-27 RX ORDER — ENOXAPARIN SODIUM 100 MG/ML
40 INJECTION SUBCUTANEOUS DAILY
Status: DISCONTINUED | OUTPATIENT
Start: 2025-04-27 | End: 2025-04-27

## 2025-04-27 RX ORDER — CLONIDINE HYDROCHLORIDE 0.1 MG/1
0.1 TABLET ORAL 2 TIMES DAILY
Status: DISCONTINUED | OUTPATIENT
Start: 2025-04-27 | End: 2025-04-30

## 2025-04-27 RX ORDER — LAMOTRIGINE 100 MG/1
100 TABLET ORAL DAILY
Status: DISCONTINUED | OUTPATIENT
Start: 2025-04-27 | End: 2025-04-30

## 2025-04-27 RX ORDER — TAMSULOSIN HYDROCHLORIDE 0.4 MG/1
0.4 CAPSULE ORAL DAILY
Status: DISCONTINUED | OUTPATIENT
Start: 2025-04-27 | End: 2025-04-30

## 2025-04-27 RX ORDER — CYCLOBENZAPRINE HCL 5 MG
5 TABLET ORAL 3 TIMES DAILY PRN
Status: DISCONTINUED | OUTPATIENT
Start: 2025-04-27 | End: 2025-04-30

## 2025-04-27 RX ORDER — GABAPENTIN 100 MG/1
200 CAPSULE ORAL 3 TIMES DAILY
Status: DISCONTINUED | OUTPATIENT
Start: 2025-04-27 | End: 2025-04-30

## 2025-04-27 RX ORDER — HYDROMORPHONE HYDROCHLORIDE 1 MG/ML
0.5 INJECTION, SOLUTION INTRAMUSCULAR; INTRAVENOUS; SUBCUTANEOUS ONCE
Refills: 0 | Status: DISCONTINUED | OUTPATIENT
Start: 2025-04-27 | End: 2025-04-27

## 2025-04-27 RX ORDER — TRAMADOL HYDROCHLORIDE 50 MG/1
100 TABLET ORAL ONCE
Status: COMPLETED | OUTPATIENT
Start: 2025-04-27 | End: 2025-04-27

## 2025-04-27 RX ORDER — HYDRALAZINE HYDROCHLORIDE 20 MG/ML
10 INJECTION INTRAMUSCULAR; INTRAVENOUS EVERY 6 HOURS PRN
Status: DISCONTINUED | OUTPATIENT
Start: 2025-04-27 | End: 2025-04-30

## 2025-04-27 RX ORDER — ONDANSETRON 2 MG/ML
4 INJECTION INTRAMUSCULAR; INTRAVENOUS EVERY 6 HOURS PRN
Status: DISCONTINUED | OUTPATIENT
Start: 2025-04-27 | End: 2025-04-30

## 2025-04-27 RX ORDER — ACETAMINOPHEN 325 MG/1
650 TABLET ORAL EVERY 4 HOURS PRN
Status: DISCONTINUED | OUTPATIENT
Start: 2025-04-27 | End: 2025-04-30

## 2025-04-27 RX ORDER — HYDROCODONE BITARTRATE AND ACETAMINOPHEN 5; 325 MG/1; MG/1
1 TABLET ORAL EVERY 4 HOURS PRN
Refills: 0 | Status: DISCONTINUED | OUTPATIENT
Start: 2025-04-27 | End: 2025-04-30

## 2025-04-27 RX ORDER — FERROUS SULFATE 325(65) MG
325 TABLET, DELAYED RELEASE (ENTERIC COATED) ORAL
Status: DISCONTINUED | OUTPATIENT
Start: 2025-04-27 | End: 2025-04-30

## 2025-04-27 RX ORDER — HYDROMORPHONE HYDROCHLORIDE 1 MG/ML
1 INJECTION, SOLUTION INTRAMUSCULAR; INTRAVENOUS; SUBCUTANEOUS ONCE
Refills: 0 | Status: COMPLETED | OUTPATIENT
Start: 2025-04-27 | End: 2025-04-27

## 2025-04-27 RX ORDER — PROCHLORPERAZINE EDISYLATE 5 MG/ML
5 INJECTION INTRAMUSCULAR; INTRAVENOUS EVERY 8 HOURS PRN
Status: DISCONTINUED | OUTPATIENT
Start: 2025-04-27 | End: 2025-04-30

## 2025-04-27 RX ORDER — CALCIUM ACETATE 667 MG/1
667 CAPSULE ORAL
Status: DISCONTINUED | OUTPATIENT
Start: 2025-04-27 | End: 2025-04-30

## 2025-04-27 RX ORDER — ARIPIPRAZOLE 5 MG/1
15 TABLET ORAL DAILY
Status: DISCONTINUED | OUTPATIENT
Start: 2025-04-27 | End: 2025-04-30

## 2025-04-27 RX ORDER — CARVEDILOL 12.5 MG/1
25 TABLET ORAL 2 TIMES DAILY WITH MEALS
Status: DISCONTINUED | OUTPATIENT
Start: 2025-04-27 | End: 2025-04-30

## 2025-04-27 RX ORDER — NIFEDIPINE 30 MG/1
30 TABLET, EXTENDED RELEASE ORAL DAILY
Status: DISCONTINUED | OUTPATIENT
Start: 2025-04-27 | End: 2025-04-30

## 2025-04-27 RX ORDER — HYDROCODONE BITARTRATE AND ACETAMINOPHEN 5; 325 MG/1; MG/1
2 TABLET ORAL EVERY 4 HOURS PRN
Refills: 0 | Status: DISCONTINUED | OUTPATIENT
Start: 2025-04-27 | End: 2025-04-30

## 2025-04-27 RX ORDER — FERROUS SULFATE 325(65) MG
325 TABLET, DELAYED RELEASE (ENTERIC COATED) ORAL
COMMUNITY

## 2025-04-27 RX ORDER — SEVELAMER CARBONATE 800 MG/1
2400 TABLET, FILM COATED ORAL
Status: DISCONTINUED | OUTPATIENT
Start: 2025-04-27 | End: 2025-04-30

## 2025-04-27 RX ORDER — LABETALOL HYDROCHLORIDE 5 MG/ML
10 INJECTION, SOLUTION INTRAVENOUS ONCE
Status: COMPLETED | OUTPATIENT
Start: 2025-04-27 | End: 2025-04-27

## 2025-04-27 RX ORDER — ENOXAPARIN SODIUM 100 MG/ML
30 INJECTION SUBCUTANEOUS DAILY
Status: DISCONTINUED | OUTPATIENT
Start: 2025-04-27 | End: 2025-04-27 | Stop reason: ALTCHOICE

## 2025-04-27 NOTE — PLAN OF CARE
Pt Aox4. Hypertensive when admitted. Managed by scheduled medications. Other VSS.     Reported pain of the HD catheter site. Norco PRN administered.   BC drawn from  red and blue catheter per order. Dressing changed by HD nurse.     Sleeping for most of the day.   Up independent.     US neck/head ordered.     Problem: PAIN - ADULT  Goal: Verbalizes/displays adequate comfort level or patient's stated pain goal  Description: INTERVENTIONS:- Encourage pt to monitor pain and request assistance- Assess pain using appropriate pain scale- Administer analgesics based on type and severity of pain and evaluate response- Implement non-pharmacological measures as appropriate and evaluate response- Consider cultural and social influences on pain and pain management- Manage/alleviate anxiety- Utilize distraction and/or relaxation techniques- Monitor for opioid side effects- Notify MD/LIP if interventions unsuccessful or patient reports new pain- Anticipate increased pain with activity and pre-medicate as appropriate  Outcome: Progressing

## 2025-04-27 NOTE — CONSULTS
[facility]   part of [facility]    Report of Consultation    Godwin Fonseca Patient Status:  Observation    1980 MRN [id number]   Location [facility] 4NW-A Attending Jagdeep Joseph, DO   Hosp Day # 0 PCP Adrian Small MD     Date of consult: 2025    REASON FOR CONSULT:     ESRD    HISTORY OF PRESENT ILLNESS:     Godwin Fonseca is a a(n) 45 year old male w ho ESRD on HD MWF, HTN, MR s/p MVR, HFpEF, TIA, DVT/PE, MGUS, presents with pain. Was recently admitted and discharged. AVF being restarted, vascular consulted, need for revision. Perm cath placed. Got HD Friday. Notes pain above perm cath in neck. Denies discharge. Denies fevers or chills.     REVIEW OF SYSTEMS:     Please see HPI for pertinent positives. 10 point review of systems otherwise reviewed and negative.     HISTORY:     Past Medical History[1]  Past Surgical History[2]  Family History[3]   reports that he quit smoking about 3 years ago. His smoking use included cigarettes. He started smoking about 30 years ago. He has a 27 pack-year smoking history. He has never been exposed to tobacco smoke. He has never used smokeless tobacco. He reports that he does not drink alcohol and does not use drugs.    ALLERGIES:     Allergies[4]    MEDICATIONS:     Current Hospital Medications[5]  Prior to Admission Medications[6]      PHYSICAL EXAM:     Vital Signs: BP (!) 180/118   Pulse 84   Temp 98.1 °F (36.7 °C) (Temporal)   Resp 20   Ht 6' 2\" (1.88 m)   Wt 229 lb (103.9 kg)   SpO2 97%   BMI 29.40 kg/m²   Temp (24hrs), Av.1 °F (36.7 °C), Min:98.1 °F (36.7 °C), Max:98.1 °F (36.7 °C)     No intake or output data in the 24 hours ending 25 1022  Wt Readings from Last 3 Encounters:   25 229 lb (103.9 kg)   25 229 lb 14.4 oz (104.3 kg)   25 240 lb (108.9 kg)       General: NAD  HEENT: NCAT, MMM, EOMI  Neck: Supple   Cardiac: Regular rate and rhythm   Lungs: CTAB   Abdomen: Soft, non-tender, non-distended   : No CVA  tenderness  Extremities: no leg edema  Neurologic/Psych: mentating well, no asterixis  Skin: No rashes    LABORATORY DATA:       Lab Results   Component Value Date    GLU 74 04/27/2025    BUN 70 (H) 04/27/2025    BUNCREA 13.0 03/07/2022    CREATSERUM 10.47 (H) 04/27/2025    ANIONGAP 9 04/27/2025    GFR 59 (L) 01/04/2018    GFRNAA 30 (L) 07/12/2022    GFRAA 35 (L) 07/12/2022    CA 9.1 04/27/2025    OSMOCALC 303 (H) 04/27/2025    ALKPHO 112 04/19/2025    AST 53 (H) 04/19/2025    ALT 84 (H) 04/19/2025    BILT 0.4 04/19/2025    TP 7.4 04/19/2025    ALB 4.3 04/24/2025    GLOBULIN 3.3 04/19/2025     04/27/2025    K 4.2 04/27/2025     04/27/2025    CO2 23.0 04/27/2025     Lab Results   Component Value Date    WBC 6.1 04/27/2025    RBC 2.59 (L) 04/27/2025    HGB 8.3 (L) 04/27/2025    HCT 23.6 (L) 04/27/2025    .0 04/27/2025    MPV 11.5 12/14/2012    MCV 91.1 04/27/2025    MCH 32.0 04/27/2025    MCHC 35.2 04/27/2025    RDW 12.8 04/27/2025    NEPRELIM 3.32 04/27/2025    NEPERCENT 54.3 04/27/2025    LYPERCENT 26.4 04/27/2025    MOPERCENT 12.4 04/27/2025    EOPERCENT 5.4 04/27/2025    BAPERCENT 1.3 04/27/2025    NE 3.32 04/27/2025    LYMABS 1.61 04/27/2025    MOABSO 0.76 04/27/2025    EOABSO 0.33 04/27/2025    BAABSO 0.08 04/27/2025     Lab Results   Component Value Date    MALBP 167.00 05/24/2023    CREUR 142.00 05/24/2023    CREUR 142.00 05/24/2023     Lab Results   Component Value Date    COLORUR Light-Yellow 03/30/2025    CLARITY Clear 03/30/2025    SPECGRAVITY 1.028 03/30/2025    GLUUR Normal 03/30/2025    BILUR Negative 03/30/2025    KETUR Negative 03/30/2025    BLOODURINE 2+ (A) 03/30/2025    PHURINE 6.5 03/30/2025    PROUR 100 (A) 03/30/2025    UROBILINOGEN Normal 03/30/2025    NITRITE Negative 03/30/2025    LEUUR Negative 03/30/2025    WBCUR 1-5 03/30/2025    RBCUR 0-2 03/30/2025    EPIUR Few (A) 03/30/2025    BACUR None Seen 03/30/2025    CAOXUR Occasional (A) 04/20/2018    HYLUR Present (A)  05/14/2019         IMAGING:     All imaging studies personally reviewed.    XR CHEST AP PORTABLE  (CPT=71045)  Result Date: 4/27/2025  CONCLUSION:  Central venous catheter tips in SVC.  Stable airspace disease and effusion in the right midlung and base.  Stable heart size and pulmonary vascularity.  No pneumothorax.   LOCATION:  [facility]      Dictated by (CST): Arlyn Oglesby MD on 4/27/2025 at 7:34 AM     Finalized by (CST): Arlyn Oglesby MD on 4/27/2025 at 7:35 AM           ASSESSMENT/PLAN:   Godwin Fonseca is a a(n) 45 year old male w ho ESRD on HD MWF, HTN, MR s/p MVR, HFpEF, TIA, DVT/PE, MGUS, presents with pain     ESRD on HD MWF  -- [facility] in [place]  -- last HD Friday  -- plan for next HD Monday. No acute need for HD today.     Neck pain / swelling   -- tenderness above perm cath site. No discharge and does not appear infected on exam. May need US or Xray. No discharge. May need IR to remove catheter and place new one. Get blood cultures to ensure, consider ID consult to ensure no infection     AVF malfunction  -- last admit = IR fistulagram 4/21/25 noted Mild narrowing at the level of the elbow within the venous outflow. No intervention   -- last admit = US 1. Left upper extremity fistula.  Approximately 2.6 cm segment of thrombus within the venous outflow tract the level of the elbow.  This corresponds to the recent fistulagram findings. 2. Arterial venous anastomosis is patent  -- US w thrombus, per vascular no acute intervention. Vascular surgery consulted, recs wait 2 wks and then see in office for revision     Anemia in ESRD:  -- recheck iron stores      MBD / Hyperphosphatemia:  -- Sevelamer 2400mg PO TID  -- Calcium Acetate 667 TID     HTN:  -- Resume Home medications     D/w RN and Dr Joseph    Thank you for allowing me to participate in the care of your patient. Please do not hesitate to contact me with concerns or questions.    Samaria Nava MD  [facility]    4/27/2025  10:22  AM         [1]   Past Medical History:   Anxiety state    Arrhythmia    Asthma ([facility])    Attention deficit hyperactivity disorder (ADHD)    Back problem    Bipolar 1 disorder (HCC)    CKD (chronic kidney disease) stage 3, GFR 30-59 ml/min ([facility])    Dr Meeks    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[facility])    Congestive heart disease (HCC)    COPD (chronic obstructive pulmonary disease) ([facility])    Coronary atherosclerosis    Deep vein thrombosis ([facility])    at age 17 R/T cast    Depression    Diabetes ([facility])    Dialysis patient    Diverticulosis of large intestine    Essential hypertension    3/21 echo: severe concentric LVH with normal EF and no MR or pHTN    Extrinsic asthma, unspecified    Heart attack ([facility])    2016- angiogram- no intervention    Heart valve disease    mitral valve repair in 1994/    High blood pressure    High cholesterol    History of blood transfusion    History of mitral valve repair    Hyperlipidemia    Low back pain    tight and stiff after sweeping and mopping    LVH (left ventricular hypertrophy)    Migraines    Mixed hyperlipidemia     HDL 38 LDL 97 VLDL 57     Monoclonal gammopathy    IgG kappa     Muscle weakness    MVP (mitral valve prolapse)    Repair 1994 at [facility]; echoes as recently as 3/21 show mild or trivial MR and no stenosis    Neuropathy    Osteoarthritis    hip ,knees    Pneumonia due to organism    Pulmonary embolism ([facility])    Renal disorder    Stroke ([facility])    TIA (transient ischemic attack)    Initial history of left-sided weakness and slurred speech. (+) cocaine. MRI of the brain, CT angiogram of the head and neck, and 2D echo are all unremarkable.     TMJ (dislocation of temporomandibular joint)    Troponin level elevated    Trop 60 60 47 with TIA and no CP: Lexiscan negative with EF 51    Visual impairment   [2]   Past Surgical History:  Procedure Laterality Date    Av fistula revision, open Left     Cabg      Colonoscopy N/A 03/26/2023    Procedure: COLONOSCOPY;  Surgeon:  Heath Vu MD;  Location:  ENDOSCOPY    Colonoscopy N/A 12/30/2023    Procedure: COLONOSCOPY with cold snare polypectomy and forcep polypectomy;  Surgeon: Ousmane Suarez MD;  Location:  ENDOSCOPY    Colonoscopy N/A 3/9/2025    Procedure: COLONOSCOPY WITH COLD SNARE POLYPECTOMY AND ENDO CLIPS X 2;  Surgeon: Bakari Noguera MD;  Location:  ENDOSCOPY    Colonoscopy & polypectomy  2019    Egd  2019    Duodenitis. Biopsied. EUS for weight loss was negative    Heart surgery      Hernia surgery  08/17/2022    Dr Barnes    Laminectomy,>2 sgmt,lumbar  09/06/2018    L4-L5 Decomp Discectomy ROEM L4-L5    Mitralplasty w cp bypass  1994    [facility]: Repair    Repair rotator cuff,chronic Left     torn and had a ruptured bicep    Sinus surgery        Spine surgery procedure unlisted      Valve repair  1994    mitral valve   [3]   Family History  Problem Relation Age of Onset    Hypertension Father     Alcohol and Other Disorders Associated Father     Substance Abuse Father         cocaine    Dementia Father     Cancer Father         lung    Diabetes Mother     Cancer Mother         multiple myeloma    Hypertension Mother     Anxiety Maternal Aunt     Depression Maternal Aunt     Anxiety Maternal Aunt     Depression Maternal Aunt     Bipolar Disorder Maternal Aunt     Diabetes Maternal Grandmother     Hypertension Maternal Grandmother     Cancer Maternal Grandfather         stomach cancer    Diabetes Maternal Grandfather     Hypertension Maternal Grandfather     Alcohol and Other Disorders Associated Maternal Grandfather     Hypertension Paternal Grandmother     Hypertension Paternal Grandfather     Cancer Sister         uterine and ovarian    Hypertension Sister     Cancer Maternal Uncle         lung    Cancer Paternal Aunt         throat   [4]   Allergies  Allergen Reactions    Hydrochlorothiazide RASH and HIVES   [5]   Current Facility-Administered Medications:     albuterol (Ventolin HFA) 108 (90 Base) MCG/ACT  inhaler 2 puff, 2 puff, Inhalation, Q6H PRN    ARIPiprazole (Abilify) tab 15 mg, 15 mg, Oral, Daily    atorvastatin (Lipitor) tab 20 mg, 20 mg, Oral, QPM    buPROPion ER (Wellbutrin XL) 24 hr tab 150 mg, 150 mg, Oral, Daily    calcium acetate (Phoslo) cap 667 mg, 667 mg, Oral, TID with meals    carvedilol (Coreg) tab 25 mg, 25 mg, Oral, BID with meals    cloNIDine (Catapres) tab 0.1 mg, 0.1 mg, Oral, BID    cyclobenzaprine (Flexeril) tab 5 mg, 5 mg, Oral, TID PRN    ferrous sulfate DR tab 325 mg, 325 mg, Oral, Daily with breakfast    FLUoxetine (PROzac) cap 40 mg, 40 mg, Oral, Daily    gabapentin (Neurontin) cap 200 mg, 200 mg, Oral, TID    hydrALAZINE (Apresoline) tab 25 mg, 25 mg, Oral, TID    lamoTRIgine (LaMICtal) tab 100 mg, 100 mg, Oral, Daily    losartan (Cozaar) tab 100 mg, 100 mg, Oral, Daily    NIFEdipine ER (Procardia-XL) 24 hr tab 30 mg, 30 mg, Oral, Daily    pantoprazole (Protonix) DR tab 40 mg, 40 mg, Oral, QAM AC    sevelamer carbonate (Renvela) tab 2,400 mg, 2,400 mg, Oral, TID CC    tamsulosin (Flomax) cap 0.4 mg, 0.4 mg, Oral, Daily    melatonin tab 3 mg, 3 mg, Oral, Nightly PRN    acetaminophen (Tylenol) tab 650 mg, 650 mg, Oral, Q4H PRN **OR** HYDROcodone-acetaminophen (Norco) 5-325 MG per tab 1 tablet, 1 tablet, Oral, Q4H PRN **OR** HYDROcodone-acetaminophen (Norco) 5-325 MG per tab 2 tablet, 2 tablet, Oral, Q4H PRN    ondansetron (Zofran) 4 MG/2ML injection 4 mg, 4 mg, Intravenous, Q6H PRN    prochlorperazine (Compazine) 10 MG/2ML injection 5 mg, 5 mg, Intravenous, Q8H PRN    enoxaparin (Lovenox) 30 MG/0.3ML SUBQ injection 30 mg, 30 mg, Subcutaneous, Daily    hydrALAzine (Apresoline) 20 mg/mL injection 10 mg, 10 mg, Intravenous, Q6H PRN  [6]   No current outpatient medications on file.

## 2025-04-27 NOTE — ED PROVIDER NOTES
Patient Seen in: [facility] [place]      History     Chief Complaint   Patient presents with    Cath Tube Problem     Stated Complaint: New dialysis catheter with swelling and pain. Placed Thursday    Subjective:   HPI    44 yo male pmh of ESRD on HD with c/o left sided neck pain and chest wall pain. Patient reports he has a tunneled catheter placed placed 4/24/2025 due to edema overlying his left upper extremity where his AV fistula is.  Patient reports since the catheter was placed he began having chest wall pain.  Has been having increased pain over the last few days and tonight the pain got severe and was rating up to his neck as well as overlying his chest wall.  He was concerned that it was infected however denies any fever chills drainage or redness from the site.          History of Present Illness               Objective:     Past Medical History:    Anxiety state    Arrhythmia    Asthma ([facility])    Attention deficit hyperactivity disorder (ADHD)    Back problem    Bipolar 1 disorder ([facility])    CKD (chronic kidney disease) stage 3, GFR 30-59 ml/min ([facility])    Dr Meeks    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[facility])    Congestive heart disease ([facility])    COPD (chronic obstructive pulmonary disease) ([facility])    Coronary atherosclerosis    Deep vein thrombosis ([facility])    at age 17 R/T cast    Depression    Diabetes ([facility])    Dialysis patient    Diverticulosis of large intestine    Essential hypertension    3/21 echo: severe concentric LVH with normal EF and no MR or pHTN    Extrinsic asthma, unspecified    Heart attack ([facility])    2016- angiogram- no intervention    Heart valve disease    mitral valve repair in 1994/    High blood pressure    High cholesterol    History of blood transfusion    History of mitral valve repair    Hyperlipidemia    Low back pain    tight and stiff after sweeping and mopping    LVH (left ventricular hypertrophy)    Migraines    Mixed hyperlipidemia     HDL 38 LDL 97 VLDL 57 TG  287    Monoclonal gammopathy    IgG kappa     Muscle weakness    MVP (mitral valve prolapse)    Repair 1994 at [place]; echoes as recently as 3/21 show mild or trivial MR and no stenosis    Neuropathy    Osteoarthritis    hip ,knees    Pneumonia due to organism    Pulmonary embolism (HCC)    Renal disorder    Stroke (HCC)    TIA (transient ischemic attack)    Initial history of left-sided weakness and slurred speech. (+) cocaine. MRI of the brain, CT angiogram of the head and neck, and 2D echo are all unremarkable.     TMJ (dislocation of temporomandibular joint)    Troponin level elevated    Trop 60 60 47 with TIA and no CP: Lexiscan negative with EF 51    Visual impairment              Past Surgical History:   Procedure Laterality Date    Av fistula revision, open Left     Cabg      Colonoscopy N/A 03/26/2023    Procedure: COLONOSCOPY;  Surgeon: Heath Vu MD;  Location:  ENDOSCOPY    Colonoscopy N/A 12/30/2023    Procedure: COLONOSCOPY with cold snare polypectomy and forcep polypectomy;  Surgeon: Ousmane Suarez MD;  Location:  ENDOSCOPY    Colonoscopy N/A 3/9/2025    Procedure: COLONOSCOPY WITH COLD SNARE POLYPECTOMY AND ENDO CLIPS X 2;  Surgeon: Bakari Noguera MD;  Location:  ENDOSCOPY    Colonoscopy & polypectomy  2019    Egd  2019    Duodenitis. Biopsied. EUS for weight loss was negative    Heart surgery      Hernia surgery  08/17/2022    Dr Barnes    Laminectomy,>2 sgmt,lumbar  09/06/2018    L4-L5 Decomp Discectomy ROEM L4-L5    Mitralplasty w cp bypass  1994    [place]: Repair    Repair rotator cuff,chronic Left     torn and had a ruptured bicep    Sinus surgery        Spine surgery procedure unlisted      Valve repair  1994    mitral valve                Social History     Socioeconomic History    Marital status:    Tobacco Use    Smoking status: Former     Current packs/day: 0.00     Average packs/day: 1 pack/day for 27.0 years (27.0 ttl pk-yrs)     Types: Cigarettes     Start date:  2/28/1995     Quit date: 2/28/2022     Years since quitting: 3.1     Passive exposure: Never    Smokeless tobacco: Never   Vaping Use    Vaping status: Never Used   Substance and Sexual Activity    Alcohol use: No    Drug use: No     Social Drivers of Health     Food Insecurity: No Food Insecurity (4/20/2025)    NCSS - Food Insecurity     Worried About Running Out of Food in the Last Year: No     Ran Out of Food in the Last Year: No   Transportation Needs: No Transportation Needs (4/20/2025)    NCSS - Transportation     Lack of Transportation: No   Housing Stability: Not At Risk (4/20/2025)    NCSS - Housing/Utilities     Has Housing: Yes     Worried About Losing Housing: No     Unable to Get Utilities: No                                Physical Exam     ED Triage Vitals [04/27/25 0318]   BP (!) 204/121   Pulse 91   Resp 24   Temp 98.1 °F (36.7 °C)   Temp src Temporal   SpO2 97 %   O2 Device None (Room air)       Current Vitals:   Vital Signs  BP: (!) 183/123  Pulse: 92  Resp: 22  Temp: 98.1 °F (36.7 °C)  Temp src: Temporal    Oxygen Therapy  SpO2: 96 %  O2 Device: None (Room air)        Physical Exam  Vitals and nursing note reviewed.   Constitutional:       Appearance: He is well-developed.   HENT:      Head: Normocephalic and atraumatic.   Eyes:      Pupils: Pupils are equal, round, and reactive to light.   Cardiovascular:      Rate and Rhythm: Normal rate and regular rhythm.   Pulmonary:      Effort: Pulmonary effort is normal.      Breath sounds: Normal breath sounds.   Abdominal:      General: Bowel sounds are normal.      Palpations: Abdomen is soft.   Musculoskeletal:         General: No deformity.      Cervical back: Normal range of motion and neck supple.      Comments: Left anterior chest wall tunneled catheter site clean dry intact no edema no erythema   Skin:     General: Skin is warm and dry.      Capillary Refill: Capillary refill takes less than 2 seconds.   Neurological:      Mental Status: He is  alert and oriented to person, place, and time.           Physical Exam                ED Course     Labs Reviewed   CBC WITH DIFFERENTIAL WITH PLATELET - Abnormal; Notable for the following components:       Result Value    RBC 2.59 (*)     HGB 8.3 (*)     HCT 23.6 (*)     All other components within normal limits   BASIC METABOLIC PANEL (8)          Results            US ARTERIAL DUPLEX UPPER EXTREMITY LEFT (CPT=93931)  Result Date: 4/23/2025  CONCLUSION:  1. Left upper extremity fistula.  Approximately 2.6 cm segment of thrombus within the venous outflow tract the level of the elbow.  This corresponds to the recent fistulagram findings. 2. Arterial venous anastomosis is patent.   LOCATION:  [facility]    Dictated by (CST): Duy De La Rosa MD on 4/23/2025 at 3:25 PM     Finalized by (CST): Duy De La Rosa MD on 4/23/2025 at 3:29 PM       IR CENTRAL VENOUS ACCESS  Result Date: 4/22/2025  PROCEDURE:  IR PERMANENT CATHETER INSERTION EXCHNGE CHECK  INDICATIONS:  ESRD  TECHNIQUE:  Prior to the procedure, I discussed with the patient and/or legal representative the potential benefits, risks, and side effects of this procedure, the likelihood of the patient achieving goals; and the potential problems that might occur during recuperation.  I discussed reasonable alternatives to the procedure, including risks, benefits, steps to prevent infection and side effects related to the alternatives, and risks related to not receiving this procedure. A witnessed verbal and signed consent was obtained and documented in the patient's chart.  IV was checked and maintained by the nurse. Moderate conscious sedation was performed under continuous pulse oximetry and cardiac monitoring under my direct supervision.  The radiology nurse was in attendance throughout the exam. Moderate conscious sedation of 2 mg Versed and 100 mcg of Fentanyl was given.  Patient was assessed and monitoring of oxygen saturation, heart rate, and blood pressure by the  nursing staff and myself during the exam for a total intraservice time of 12 minutes of conscious sedation time from 915 to 927.  Two grams of Ancef were given pre-procedurally via existing IV.  The patient was placed supine on the angiographic table and the left chest and neck was prepped and covered with a full body drape in the usual sterile fashion.  All operators present for the case performed standard pre-procedural prep including hand washing, sterile gloves, gown, mask, and cap.  All aspects of the maximum sterile barrier technique were followed.  A preprocedural time out was performed with all physicians, technologists, and nurses involved with the procedure. Ultrasound of the right neck demonstrated a severely stenotic right internal jugular vein. Sonography of the left  neck demonstrated a patent internal jugular vein and a permanent image was obtained.  Under sonographic guidance, the left   internal jugular vein was accessed using a micropuncture system.  A guidewire was advanced into the IVC under fluoroscopic guidance.  Using blunt dissection a dual lumen tunneled catheter was placed via a peel-away sheath.  Both lumens flushed and returned easily.  The catheter was secured and heparinized.  A small amount of Dermabond was placed at the neck venotomy site.   Sterile gauze and transparent dressing was applied.  The patient tolerated the procedure well and there were no immediate complications. Routine post tunneled catheter insertion instructions were communicated to the patient.  ESTIMATED BLOOD LOSS: Less than 5cc.  FLUORO TIME/DOSE:  0.4 minutes  AIR KERMA:  5.84 mGy  IMPRESSION: Successful placement of left internal jugular vein tunneled dialysis catheter ( 27 cm tip to cuff Bard HemoSplit catheter)   LOCATION:  [place]    Dictated by (CST): Duy De La Rosa MD on 4/22/2025 at 10:16 AM     Finalized by (CST): Duy De La Rosa MD on 4/22/2025 at 10:18 AM       IR GRAFT PROCEDURE  Result Date:  4/21/2025  CONCLUSION: 1. There is a narrowing within the venous outflow at the level the elbow.  Due to tortuosity proximal to this narrowing, a balloon catheter cannot be advanced into the location in order to angioplasty.  There is good flow through the fistula noted as there are numerous collaterals proximal to this narrowing. 2. After discussion with Dr. Coombs, an attempt for dialysis will be made through the fistula and if unsuccessful a tunneled dialysis catheter will be placed.   PLAN:  Patient will follow-up with nephrology and at dialysis.  LOCATION:  [facility]       Dictated by (CST): Quinn Bernal MD on 4/21/2025 at 12:59 PM     Finalized by (CST): Quinn Bernal MD on 4/21/2025 at 1:15 PM       CT CHEST+ABDOMEN+PELVIS(CPT=71250/19679)  Result Date: 4/20/2025  CONCLUSION:   1. No acute process identified within the chest, abdomen, or pelvis.  2. Scarring and volume loss of the right lung, similar compared to 03/29/2025.  Stable small right pleural effusion.  Stable mediastinal lymphadenopathy.  3. Punctate hyperdensity of the right distal ureter near the UVJ may reflect urolithiasis, though without evidence of collecting system obstruction.  4. Pancolonic diverticulosis without evidence of acute diverticulitis.      LOCATION:  [facility]    Dictated by (CST): Claude Singh MD on 4/20/2025 at 0:37 AM     Finalized by (CST): Claude Singh MD on 4/20/2025 at 0:46 AM       XR CHEST AP PORTABLE  (CPT=71045)  Result Date: 4/19/2025  CONCLUSION:  Stable cardiac and mediastinal contours with valvular prosthesis noted.  There continues to be confluent opacification of the right lower lung which likely represents some combination of atelectasis, airspace disease, and pleural effusion.  No appreciable pneumothorax.   LOCATION:  [facility]      Dictated by (CST): Claude Singh MD on 4/19/2025 at 11:02 PM     Finalized by (CST): Claude Singh MD on 4/19/2025 at 11:03 PM                            MDM      This is a 45-year-old  male who presents He with complaints of catheter site pain.  Patient reports he been taking Norco without pain control.  Hypertensive on arrival nontoxic exam catheter site appears clean dry and intact no overlying edema or erythema.  He is use catheter Friday for dialysis and reports that it worked perfectly fine.  Reports that the pain is worsening and cannot take Norco for pain control as it makes him nauseous.  Patient was given IM dose of Dilaudid with still reporting 8 out of 10 pain.  Will obtain basic labs discussed admission for pain control and further evaluation of catheter.  Patient shows understanding plan and is in agreement plan discharged home stable condition Case discussed with hospitalist via [employer].    Admission disposition: 4/27/2025  5:41 AM           Medical Decision Making      Disposition and Plan     Clinical Impression:  1. Left-sided chest wall pain         Disposition:  Admit  4/27/2025  5:41 am    Follow-up:  No follow-up provider specified.        Medications Prescribed:  Current Discharge Medication List          Supplementary Documentation:         Hospital Problems       Present on Admission  Date Reviewed: 3/30/2025          ICD-10-CM Noted POA    * (Principal) Left-sided chest wall pain R07.89 4/27/2025 Unknown

## 2025-04-27 NOTE — ED INITIAL ASSESSMENT (HPI)
Dialysis cath placed to left side of neck place on Thursday.. Reports pain and swelling. Has been compliant with dialysis treatments.

## 2025-04-27 NOTE — H&P
[facility] Hospitalist History and Physical      PCP: Adrian Small MD    History of Present Illness: This is the story of Mr. Raymundo Connelly who is a 44 y/o M w/ PMHx of ESRD on HD, HFpEF, hypertension, type 2 diabetes, CAD, COPD, history of recurrent admissions, bipolar disorder who presents back to the hospital after recent discharge on 4/24 for evaluation of shortness of breath, and to rule out a left fistula infection.  At that time he was found to have a 2.6 cm thrombus within the venous outflow tract and as such a PermCath was placed for continuation of dialysis and patient was discharged.  He returns back to the emergency department with chief complaints of chest wall pain that was worsening and radiating up to his neck and he was concerned for infection and as such he came in for further evaluation.  ED vitals: Hypertensive  Labs:  No white count elevation  Hemoglobin 8.3    Chest x-ray with PermCath noted to be in the SVC, no other acute findings    Patient was admitted for further evaluation and treatment.    Past Medical History[1]   Past Surgical History[2]     Prior to Admission Medications[3]    Social History     Tobacco Use    Smoking status: Former     Current packs/day: 0.00     Average packs/day: 1 pack/day for 27.0 years (27.0 ttl pk-yrs)     Types: Cigarettes     Start date: 2/28/1995     Quit date: 2/28/2022     Years since quitting: 3.1     Passive exposure: Never    Smokeless tobacco: Never   Substance Use Topics    Alcohol use: No        OBJECTIVE:  BP (!) 180/118   Pulse 84   Temp 98.1 °F (36.7 °C) (Temporal)   Resp 20   Ht 6' 2\" (1.88 m)   Wt 229 lb (103.9 kg)   SpO2 97%   BMI 29.40 kg/m²   Gen: No acute distress, alert and oriented x3  Pulm: Lungs clear bilaterally, normal respiratory effort  CV: Heart with regular rate and rhythm  Abd: Abdomen soft, nontender, non-distended  Skin: no rashes or lesions  Extremities: no edema noted  Neuro:  no focal deficits      Data  Review:    Pertinent Labs and Imaging independently reviewed    Assessment/Plan:   Outpatient records or previous hospital records reviewed.   [facility] Hospitalist to continue to follow patient while in house    A/P: 45-year-old male with past medical history of ESRD on HD, HFpEF, hypertension, type 2 diabetes, CAD, COPD, history of recurrent admissions, bipolar disorder who presents to the hospital for evaluation of PermCath and concern for infection.    Concern for PermCath infection-likely ruled out  ESRD on HD  No evidence of fevers, white count elevation, chest x-ray with no acute findings, patient having no overt tenderness, redness, drainage from the site  Plan:  - Nephrology consulted for further assistance and evaluation of PermCath site along with any dialysis needs  -Ultrasound of the neck to evaluate PermCath and presence of any fluid collection, abscess  - Cultures from PermCath, not from skin site, follow  - As needed pain control, continue gabapentin  -Continue PhosLo, sevelamer    HFpEF  Hypertension  CAD  - Continue Coreg, clonidine, losartan, nifedipine, hydralazine statin    MDD/HILARIO/BPD  - Continue Abilify, Prozac, Lamictal, Wellbutrin    EMILY  - Continue ferrous sulfate    A total of 38 minutes taken with patient and coordinating care.  Greater than 50% face to face encounter.      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[facility])    Congestive heart disease ([facility])    COPD (chronic obstructive pulmonary disease) ([facility])    Coronary atherosclerosis    Deep vein thrombosis ([facility])    at age 17 R/T cast    Depression    Diabetes ([facility])    Dialysis patient    Diverticulosis of large intestine    Essential hypertension     3/21 echo: severe concentric LVH with normal EF and no MR or pHTN    Extrinsic asthma, unspecified    Heart attack ([facility])    2016- angiogram- no intervention    Heart valve disease    mitral valve repair in 1994/    High blood pressure    High cholesterol    History of blood transfusion    History of mitral valve repair    Hyperlipidemia    Low back pain    tight and stiff after sweeping and mopping    LVH (left ventricular hypertrophy)    Migraines    Mixed hyperlipidemia     HDL 38 LDL 97 VLDL 57     Monoclonal gammopathy    IgG kappa     Muscle weakness    MVP (mitral valve prolapse)    Repair 1994 at [place]; echoes as recently as 3/21 show mild or trivial MR and no stenosis    Neuropathy    Osteoarthritis    hip ,knees    Pneumonia due to organism    Pulmonary embolism ([facility])    Renal disorder    Stroke (HCC)    TIA (transient ischemic attack)    Initial history of left-sided weakness and slurred speech. (+) cocaine. MRI of the brain, CT angiogram of the head and neck, and 2D echo are all unremarkable.     TMJ (dislocation of temporomandibular joint)    Troponin level elevated    Trop 60 60 47 with TIA and no CP: Lexiscan negative with EF 51    Visual impairment   [2]   Past Surgical History:  Procedure Laterality Date    Av fistula revision, open Left     Cabg      Colonoscopy N/A 03/26/2023    Procedure: COLONOSCOPY;  Surgeon: Heath Vu MD;  Location:  ENDOSCOPY    Colonoscopy N/A 12/30/2023    Procedure: COLONOSCOPY with cold snare polypectomy and forcep polypectomy;  Surgeon: Ousmane Suarez MD;  Location:  ENDOSCOPY    Colonoscopy N/A 3/9/2025    Procedure: COLONOSCOPY WITH COLD SNARE POLYPECTOMY AND ENDO CLIPS X 2;  Surgeon: Bakari Noguera MD;  Location:  ENDOSCOPY    Colonoscopy & polypectomy  2019    Egd  2019    Duodenitis. Biopsied. EUS for weight loss was negative    Heart surgery      Hernia surgery  08/17/2022    Dr Barnes    Laminectomy,>2 sgmt,lumbar  09/06/2018     L4-L5 Decomp Discectomy ROEM L4-L5    Mitralplasty w cp bypass  1994    [place]: Repair    Repair rotator cuff,chronic Left     torn and had a ruptured bicep    Sinus surgery        Spine surgery procedure unlisted      Valve repair  1994    mitral valve   [3]   No current outpatient medications on file.

## 2025-04-27 NOTE — ED QUICK NOTES
Orders for admission, patient is aware of plan and ready to go upstairs. Any questions, please call ED RN Catherine at extension [phone].     Patient Covid vaccination status: Fully vaccinated     COVID Test Ordered in ED: None    COVID Suspicion at Admission: N/A    Running Infusions: Medication Infusions[1] None    Mental Status/LOC at time of transport: AOx3    Other pertinent information:   CIWA score: N/A   NIH score:  N/A             [1]

## 2025-04-28 ENCOUNTER — APPOINTMENT (OUTPATIENT)
Dept: ULTRASOUND IMAGING | Facility: HOSPITAL | Age: 45
End: 2025-04-28
Attending: INTERNAL MEDICINE
Payer: MEDICARE

## 2025-04-28 LAB
ALBUMIN SERPL-MCNC: 3.9 G/DL (ref 3.2–4.8)
ANION GAP SERPL CALC-SCNC: 13 MMOL/L (ref 0–18)
BASOPHILS # BLD AUTO: 0.08 X10(3) UL (ref 0–0.2)
BASOPHILS NFR BLD AUTO: 1.6 %
BUN BLD-MCNC: 75 MG/DL (ref 9–23)
CALCIUM BLD-MCNC: 9 MG/DL (ref 8.7–10.6)
CHLORIDE SERPL-SCNC: 107 MMOL/L (ref 98–112)
CO2 SERPL-SCNC: 20 MMOL/L (ref 21–32)
CREAT BLD-MCNC: 11.6 MG/DL (ref 0.7–1.3)
DEPRECATED HBV CORE AB SER IA-ACNC: 614 NG/ML (ref 50–336)
EGFRCR SERPLBLD CKD-EPI 2021: 5 ML/MIN/1.73M2 (ref 60–?)
EOSINOPHIL # BLD AUTO: 0.31 X10(3) UL (ref 0–0.7)
EOSINOPHIL NFR BLD AUTO: 6 %
ERYTHROCYTE [DISTWIDTH] IN BLOOD BY AUTOMATED COUNT: 12.6 %
GLUCOSE BLD-MCNC: 96 MG/DL (ref 70–99)
HCT VFR BLD AUTO: 24.3 % (ref 39–53)
HGB BLD-MCNC: 7.8 G/DL (ref 13–17.5)
IMM GRANULOCYTES # BLD AUTO: 0.01 X10(3) UL (ref 0–1)
IMM GRANULOCYTES NFR BLD: 0.2 %
IRON SATN MFR SERPL: 13 % (ref 20–50)
IRON SERPL-MCNC: 30 UG/DL (ref 65–175)
LYMPHOCYTES # BLD AUTO: 1.31 X10(3) UL (ref 1–4)
LYMPHOCYTES NFR BLD AUTO: 25.4 %
MAGNESIUM SERPL-MCNC: 2.3 MG/DL (ref 1.6–2.6)
MCH RBC QN AUTO: 31.7 PG (ref 26–34)
MCHC RBC AUTO-ENTMCNC: 32.1 G/DL (ref 31–37)
MCV RBC AUTO: 98.8 FL (ref 80–100)
MONOCYTES # BLD AUTO: 0.64 X10(3) UL (ref 0.1–1)
MONOCYTES NFR BLD AUTO: 12.4 %
NEUTROPHILS # BLD AUTO: 2.81 X10 (3) UL (ref 1.5–7.7)
NEUTROPHILS # BLD AUTO: 2.81 X10(3) UL (ref 1.5–7.7)
NEUTROPHILS NFR BLD AUTO: 54.4 %
OSMOLALITY SERPL CALC.SUM OF ELEC: 312 MOSM/KG (ref 275–295)
PHOSPHATE SERPL-MCNC: 5.9 MG/DL (ref 2.4–5.1)
PLATELET # BLD AUTO: 153 10(3)UL (ref 150–450)
POTASSIUM SERPL-SCNC: 4.4 MMOL/L (ref 3.5–5.1)
RBC # BLD AUTO: 2.46 X10(6)UL (ref 4.3–5.7)
SODIUM SERPL-SCNC: 140 MMOL/L (ref 136–145)
TOTAL IRON BINDING CAPACITY: 224 UG/DL (ref 250–425)
TRANSFERRIN SERPL-MCNC: 155 MG/DL (ref 215–365)
WBC # BLD AUTO: 5.2 X10(3) UL (ref 4–11)

## 2025-04-28 PROCEDURE — 85025 COMPLETE CBC W/AUTO DIFF WBC: CPT | Performed by: INTERNAL MEDICINE

## 2025-04-28 PROCEDURE — 83540 ASSAY OF IRON: CPT | Performed by: INTERNAL MEDICINE

## 2025-04-28 PROCEDURE — 82728 ASSAY OF FERRITIN: CPT | Performed by: INTERNAL MEDICINE

## 2025-04-28 PROCEDURE — 83735 ASSAY OF MAGNESIUM: CPT | Performed by: INTERNAL MEDICINE

## 2025-04-28 PROCEDURE — 83550 IRON BINDING TEST: CPT | Performed by: INTERNAL MEDICINE

## 2025-04-28 PROCEDURE — 90935 HEMODIALYSIS ONE EVALUATION: CPT | Performed by: INTERNAL MEDICINE

## 2025-04-28 PROCEDURE — 76536 US EXAM OF HEAD AND NECK: CPT | Performed by: INTERNAL MEDICINE

## 2025-04-28 PROCEDURE — 80069 RENAL FUNCTION PANEL: CPT | Performed by: INTERNAL MEDICINE

## 2025-04-28 PROCEDURE — 96372 THER/PROPH/DIAG INJ SC/IM: CPT

## 2025-04-28 NOTE — PROGRESS NOTES
[facility] Nephrology  Inpatient Follow-up    Godwin Fonseca Patient Status:  Observation    1980 MRN [id number]   Location [facility] 4NW-A Attending Regina Salmon MD   Hosp Day # 0 PCP Adrian Small MD       SUBJECTIVE:     Patient seen and examined at bedside. HD today.    MEDICATIONS:     Current Hospital Medications[1]    PHYSICAL EXAM:     Vital Signs: /80 (BP Location: Right arm)   Pulse 93   Temp 98 °F (36.7 °C) (Oral)   Resp 18   Ht 6' 2\" (1.88 m)   Wt 247 lb (112 kg)   SpO2 100%   BMI 31.71 kg/m²   Temp (24hrs), Av.9 °F (36.6 °C), Min:96.8 °F (36 °C), Max:98.3 °F (36.8 °C)       Intake/Output Summary (Last 24 hours) at 2025 1433  Last data filed at 2025 1300  Gross per 24 hour   Intake 1200 ml   Output 5200 ml   Net -4000 ml     Wt Readings from Last 3 Encounters:   25 247 lb (112 kg)   25 229 lb 14.4 oz (104.3 kg)   25 240 lb (108.9 kg)       General: no acute distress  HEENT: normocephalic, atraumatic  Respiratory: no distress  Abdomen: soft, non-tender  Extremities: no edema bilaterally  Skin: warm, dry  Neuro: awake, alert     LABORATORY DATA:     Lab Results   Component Value Date    GLU 96 2025    BUN 75 (H) 2025    BUNCREA 13.0 2022    CREATSERUM 11.60 (H) 2025    ANIONGAP 13 2025    GFR 59 (L) 2018    GFRNAA 30 (L) 2022    GFRAA 35 (L) 2022    CA 9.0 2025    OSMOCALC 312 (H) 2025    ALKPHO 112 2025    AST 53 (H) 2025    ALT 84 (H) 2025    BILT 0.4 2025    TP 7.4 2025    ALB 3.9 2025    GLOBULIN 3.3 2025     2025    K 4.4 2025     2025    CO2 20.0 (L) 2025     Lab Results   Component Value Date    WBC 5.2 2025    RBC 2.46 (L) 2025    HGB 7.8 (L) 2025    HCT 24.3 (L) 2025    .0 2025    MPV 11.5 2012    MCV 98.8 2025    MCH 31.7 2025    MCHC  32.1 04/28/2025    RDW 12.6 04/28/2025    NEPRELIM 2.81 04/28/2025    NEPERCENT 54.4 04/28/2025    LYPERCENT 25.4 04/28/2025    MOPERCENT 12.4 04/28/2025    EOPERCENT 6.0 04/28/2025    BAPERCENT 1.6 04/28/2025    NE 2.81 04/28/2025    LYMABS 1.31 04/28/2025    MOABSO 0.64 04/28/2025    EOABSO 0.31 04/28/2025    BAABSO 0.08 04/28/2025     Lab Results   Component Value Date    MALBP 167.00 05/24/2023    CREUR 142.00 05/24/2023    CREUR 142.00 05/24/2023     Lab Results   Component Value Date    COLORUR Light-Yellow 03/30/2025    CLARITY Clear 03/30/2025    SPECGRAVITY 1.028 03/30/2025    GLUUR Normal 03/30/2025    BILUR Negative 03/30/2025    KETUR Negative 03/30/2025    BLOODURINE 2+ (A) 03/30/2025    PHURINE 6.5 03/30/2025    PROUR 100 (A) 03/30/2025    UROBILINOGEN Normal 03/30/2025    NITRITE Negative 03/30/2025    LEUUR Negative 03/30/2025    WBCUR 1-5 03/30/2025    RBCUR 0-2 03/30/2025    EPIUR Few (A) 03/30/2025    BACUR None Seen 03/30/2025    CAOXUR Occasional (A) 04/20/2018    HYLUR Present (A) 05/14/2019       IMAGING:     Reviewed    ASSESSMENT:      # ESRD on HD  -MWF schedule outpatient  -Cholo NIETO AVF with thrombus - plan for revision as outpatient  -S/p TDC placement     # HTN/Volume Status  -Home medications: carvedilol, hydralazine, losartan, nifedipine, clonidine     # Anemia     # Secondary Hyperparathyroidism     PLAN:      -HD per MWF schedule  -UF with HD as tolerated  -Continue home BP medications   -Defer OWEN in setting of high BPs  -Anemia management per primary  -Continue sevelamer, calcium acetate  -Vascular surgery following for thrombus, plan for outpatient revision in couple of weeks.   -Avoid nephrotoxins and renally dose medications for iHD 3x/weekly  -Monitor intake and output daily  -Daily weights  We will continue to follow.    Thank you for allowing us to participate in the care of this patient.       Samantha Muñoz DO   [facility] - Nephrology           [1]    Current Facility-Administered Medications   Medication Dose Route Frequency    albuterol (Ventolin HFA) 108 (90 Base) MCG/ACT inhaler 2 puff  2 puff Inhalation Q6H PRN    ARIPiprazole (Abilify) tab 15 mg  15 mg Oral Daily    atorvastatin (Lipitor) tab 20 mg  20 mg Oral QPM    buPROPion ER (Wellbutrin XL) 24 hr tab 150 mg  150 mg Oral Daily    calcium acetate (Phoslo) cap 667 mg  667 mg Oral TID with meals    carvedilol (Coreg) tab 25 mg  25 mg Oral BID with meals    cloNIDine (Catapres) tab 0.1 mg  0.1 mg Oral BID    cyclobenzaprine (Flexeril) tab 5 mg  5 mg Oral TID PRN    ferrous sulfate DR tab 325 mg  325 mg Oral Daily with breakfast    FLUoxetine (PROzac) cap 40 mg  40 mg Oral Daily    gabapentin (Neurontin) cap 200 mg  200 mg Oral TID    hydrALAZINE (Apresoline) tab 25 mg  25 mg Oral TID    lamoTRIgine (LaMICtal) tab 100 mg  100 mg Oral Daily    losartan (Cozaar) tab 100 mg  100 mg Oral Daily    NIFEdipine ER (Procardia-XL) 24 hr tab 30 mg  30 mg Oral Daily    pantoprazole (Protonix) DR tab 40 mg  40 mg Oral QAM AC    sevelamer carbonate (Renvela) tab 2,400 mg  2,400 mg Oral TID CC    tamsulosin (Flomax) cap 0.4 mg  0.4 mg Oral Daily    melatonin tab 3 mg  3 mg Oral Nightly PRN    acetaminophen (Tylenol) tab 650 mg  650 mg Oral Q4H PRN    Or    HYDROcodone-acetaminophen (Norco) 5-325 MG per tab 1 tablet  1 tablet Oral Q4H PRN    Or    HYDROcodone-acetaminophen (Norco) 5-325 MG per tab 2 tablet  2 tablet Oral Q4H PRN    ondansetron (Zofran) 4 MG/2ML injection 4 mg  4 mg Intravenous Q6H PRN    prochlorperazine (Compazine) 10 MG/2ML injection 5 mg  5 mg Intravenous Q8H PRN    hydrALAzine (Apresoline) 20 mg/mL injection 10 mg  10 mg Intravenous Q6H PRN    HYDROmorphone (Dilaudid) 1 MG/ML injection 0.5 mg  0.5 mg Intravenous Once    heparin (Porcine) 5000 UNIT/ML injection 5,000 Units  5,000 Units Subcutaneous Q8H MARTHA    sodium chloride 0.9 % IV bolus 100 mL  100 mL Intravenous Q30 Min PRN    And    albumin human  (Albumin) 25% injection 25 g  25 g Intravenous PRN Dialysis

## 2025-04-28 NOTE — PROGRESS NOTES
Alert and orientated x4.  C/o neck pain.  Requesting IV pain medication.  Order given for 1 dose.  Good appetite.  Afebrile.

## 2025-04-28 NOTE — PLAN OF CARE
Pt Aox4. VSS. RA.   No complain of N/V/D.   Reported pain of the HD catheter site. Norco PRN administered.   Sleeping for most of the day.   Up independent.     Hemodialysis done today. Tolerated well.    US neck/head completed.     Problem: PAIN - ADULT  Goal: Verbalizes/displays adequate comfort level or patient's stated pain goal  Description: INTERVENTIONS:- Encourage pt to monitor pain and request assistance- Assess pain using appropriate pain scale- Administer analgesics based on type and severity of pain and evaluate response- Implement non-pharmacological measures as appropriate and evaluate response- Consider cultural and social influences on pain and pain management- Manage/alleviate anxiety- Utilize distraction and/or relaxation techniques- Monitor for opioid side effects- Notify MD/LIP if interventions unsuccessful or patient reports new pain- Anticipate increased pain with activity and pre-medicate as appropriate  Outcome: Progressing

## 2025-04-28 NOTE — DIETARY NOTE
[facility]   part of [facility]   CLINICAL NUTRITION    Godwin Fonseca     Admitting diagnosis:  Left-sided chest wall pain [R07.89]    Ht: 188 cm (6' 2\")  Wt: 112 kg (247 lb).   Body mass index is 31.71 kg/m².  IBW: 86kg    Wt Readings from Last 6 Encounters:   04/28/25 112 kg (247 lb)   04/20/25 104.3 kg (229 lb 14.4 oz)   04/05/25 108.9 kg (240 lb)   04/04/25 112.1 kg (247 lb 2.2 oz)   03/30/25 112.5 kg (248 lb 0.3 oz)   03/27/25 109.9 kg (242 lb 4.6 oz)        Labs/Meds reviewed    Diet:       Procedures    Renal diet Renal; Is Patient on Accuchecks? No     Percent Meals Eaten (last 3 days)       Date/Time Percent Meals Eaten (%)    04/27/25 1500 90 %            45 year old male admited with pain around new placed HD catheter.  Pt with ESRD on HD , HF , HTN, DM2, CAD and COPD    Pt chart reviewed d/t MST 2.  Patient reports good appetite at this time.  Nursing notes reports Percent Meals Eaten (%): 90 % intake for last meal.  Tolerating po diet without diarrhea, emesis, or constipation.   No significant weight changes noted.     Patient is at low nutrition risk at this time.    Please consult if patient status changes or nutrition issues arise.    iKya Youssef RD,LDN  Clinical Dietitian  [id number]

## 2025-04-28 NOTE — PROGRESS NOTES
[facility] Hospitalist Progress Note       SUBJECTIVE:  Having some neck pain   No fevers noted  No swelling noted     OBJECTIVE:  Scheduled Meds: Scheduled Medications[1]  Continuous Infusions: Medication Infusions[2]  PRN Meds: PRN Medications[3]    Vitals  Vitals:    04/28/25 0732   BP: (!) 143/94   Pulse: 86   Resp: 18   Temp: 98.3 °F (36.8 °C)         Exam   Gen-    no acute distress, alert and oriented x 3    RESP-   Lungs CTA, normal respiratory effort  CV-      Heart RRR, no mgr  Abd-    soft, nondistended, nontender, bowel sounds present  Skin-    no rash  Neuro-  no focal neurologic deficits  Ext-      No edema in extremities   Psych- alert and oriented x 3     Labs:     Recent Labs   Lab 04/22/25  0637 04/23/25  0756 04/27/25  0537 04/28/25  0631   WBC 5.0 5.0 6.1 5.2   HGB 7.9* 8.2* 8.3* 7.8*   MCV 91.6 94.2 91.1 98.8   .0 188.0 167.0 153.0       Recent Labs   Lab 04/22/25  0637 04/23/25  0756 04/24/25  0738 04/27/25  0537 04/28/25  0631    142 140 137 140   K 4.1 3.7 4.0 4.2 4.4    102 101 105 107   CO2 24.0 28.0 26.0 23.0 20.0*   BUN 71* 45* 34* 70* 75*   CREATSERUM 10.59* 8.05* 7.14* 10.47* 11.60*   CA 8.5* 9.0 9.7 9.1 9.0   MG 2.4 2.1 2.2  --  2.3   PHOS 7.0* 5.8* 6.4*  --  5.9*   * 107* 114* 74 96       Recent Labs   Lab 04/22/25  0637 04/23/25  0756 04/24/25  0738 04/28/25  0631   ALB 3.8 4.1 4.3 3.9       No results for input(s): \"PGLU\" in the last 168 hours.    AP:  45-year-old male with past medical history of ESRD on HD, HFpEF, hypertension, type 2 diabetes, CAD, COPD, history of recurrent admissions, bipolar disorder who presents to the hospital for evaluation of PermCath and concern for infection.     Concern for PermCath infection-likely ruled out  ESRD on HD  No evidence of fevers, white count elevation, chest x-ray with no acute findings, patient having no overt tenderness, redness, drainage from the site  Plan:  - Nephrology consulted for further assistance and  evaluation of PermCath site along with any dialysis needs  -Ultrasound of the neck to evaluate PermCath and presence of any fluid collection, abscess  - Cultures from PermCath, not from skin site, follow  - As needed pain control, continue gabapentin  -Continue PhosLo, sevelamer     HFpEF  Hypertension  CAD  - Continue Coreg, clonidine, losartan, nifedipine, hydralazine statin     MDD/HILARIO/BPD  - Continue Abilify, Prozac, Lamictal, Wellbutrin     EMILY  - Continue ferrous sulfate             Regina Salmon MD           [1]    ARIPiprazole  15 mg Oral Daily    atorvastatin  20 mg Oral QPM    buPROPion ER  150 mg Oral Daily    calcium acetate  667 mg Oral TID with meals    carvedilol  25 mg Oral BID with meals    cloNIDine  0.1 mg Oral BID    ferrous sulfate  325 mg Oral Daily with breakfast    FLUoxetine HCl  40 mg Oral Daily    gabapentin  200 mg Oral TID    hydrALAZINE  25 mg Oral TID    lamoTRIgine  100 mg Oral Daily    losartan  100 mg Oral Daily    NIFEdipine ER  30 mg Oral Daily    pantoprazole  40 mg Oral QAM AC    sevelamer carbonate  2,400 mg Oral TID CC    tamsulosin  0.4 mg Oral Daily    HYDROmorphone  0.5 mg Intravenous Once    HYDROmorphone        heparin  5,000 Units Subcutaneous Q8H MARTHA    heparin  1.5 mL Intracatheter Once   [2] [3]   albuterol    cyclobenzaprine    melatonin    acetaminophen **OR** HYDROcodone-acetaminophen **OR** HYDROcodone-acetaminophen    ondansetron    prochlorperazine    hydrALAzine    HYDROmorphone    sodium chloride **AND** albumin human

## 2025-04-29 ENCOUNTER — APPOINTMENT (OUTPATIENT)
Dept: ULTRASOUND IMAGING | Facility: HOSPITAL | Age: 45
End: 2025-04-29
Attending: INTERNAL MEDICINE
Payer: MEDICARE

## 2025-04-29 LAB
ALBUMIN SERPL-MCNC: 4.4 G/DL (ref 3.2–4.8)
ANION GAP SERPL CALC-SCNC: 10 MMOL/L (ref 0–18)
BASOPHILS # BLD AUTO: 0.08 X10(3) UL (ref 0–0.2)
BASOPHILS NFR BLD AUTO: 1.6 %
BUN BLD-MCNC: 45 MG/DL (ref 9–23)
CALCIUM BLD-MCNC: 9.4 MG/DL (ref 8.7–10.6)
CHLORIDE SERPL-SCNC: 103 MMOL/L (ref 98–112)
CO2 SERPL-SCNC: 26 MMOL/L (ref 21–32)
CREAT BLD-MCNC: 8.41 MG/DL (ref 0.7–1.3)
EGFRCR SERPLBLD CKD-EPI 2021: 7 ML/MIN/1.73M2 (ref 60–?)
EOSINOPHIL # BLD AUTO: 0.24 X10(3) UL (ref 0–0.7)
EOSINOPHIL NFR BLD AUTO: 4.7 %
ERYTHROCYTE [DISTWIDTH] IN BLOOD BY AUTOMATED COUNT: 12.5 %
GLUCOSE BLD-MCNC: 106 MG/DL (ref 70–99)
HCT VFR BLD AUTO: 26.5 % (ref 39–53)
HGB BLD-MCNC: 9.1 G/DL (ref 13–17.5)
IMM GRANULOCYTES # BLD AUTO: 0.01 X10(3) UL (ref 0–1)
IMM GRANULOCYTES NFR BLD: 0.2 %
LYMPHOCYTES # BLD AUTO: 1.6 X10(3) UL (ref 1–4)
LYMPHOCYTES NFR BLD AUTO: 31.3 %
MAGNESIUM SERPL-MCNC: 2.2 MG/DL (ref 1.6–2.6)
MCH RBC QN AUTO: 31.2 PG (ref 26–34)
MCHC RBC AUTO-ENTMCNC: 34.3 G/DL (ref 31–37)
MCV RBC AUTO: 90.8 FL (ref 80–100)
MONOCYTES # BLD AUTO: 0.75 X10(3) UL (ref 0.1–1)
MONOCYTES NFR BLD AUTO: 14.7 %
NEUTROPHILS # BLD AUTO: 2.43 X10 (3) UL (ref 1.5–7.7)
NEUTROPHILS # BLD AUTO: 2.43 X10(3) UL (ref 1.5–7.7)
NEUTROPHILS NFR BLD AUTO: 47.5 %
OSMOLALITY SERPL CALC.SUM OF ELEC: 300 MOSM/KG (ref 275–295)
PHOSPHATE SERPL-MCNC: 5.9 MG/DL (ref 2.4–5.1)
PLATELET # BLD AUTO: 205 10(3)UL (ref 150–450)
POTASSIUM SERPL-SCNC: 4.1 MMOL/L (ref 3.5–5.1)
RBC # BLD AUTO: 2.92 X10(6)UL (ref 4.3–5.7)
SODIUM SERPL-SCNC: 139 MMOL/L (ref 136–145)
WBC # BLD AUTO: 5.1 X10(3) UL (ref 4–11)

## 2025-04-29 PROCEDURE — 36415 COLL VENOUS BLD VENIPUNCTURE: CPT | Performed by: INTERNAL MEDICINE

## 2025-04-29 PROCEDURE — 80069 RENAL FUNCTION PANEL: CPT | Performed by: INTERNAL MEDICINE

## 2025-04-29 PROCEDURE — 94760 N-INVAS EAR/PLS OXIMETRY 1: CPT

## 2025-04-29 PROCEDURE — 83735 ASSAY OF MAGNESIUM: CPT | Performed by: INTERNAL MEDICINE

## 2025-04-29 PROCEDURE — 85025 COMPLETE CBC W/AUTO DIFF WBC: CPT | Performed by: INTERNAL MEDICINE

## 2025-04-29 PROCEDURE — 96372 THER/PROPH/DIAG INJ SC/IM: CPT

## 2025-04-29 PROCEDURE — 93971 EXTREMITY STUDY: CPT | Performed by: INTERNAL MEDICINE

## 2025-04-29 NOTE — PROGRESS NOTES
[facility] - Nephrology  Inpatient Follow-up    Godwin Fonseca Patient Status:  Observation    1980 MRN [id number]   Location [facility] 4NW-A Attending Regina Salmon MD   Hosp Day # 0 PCP Adrian Small MD       SUBJECTIVE:     Patient seen and examined at bedside.     MEDICATIONS:     Current Hospital Medications[1]    PHYSICAL EXAM:     Vital Signs: BP (!) 139/96 (BP Location: Right arm)   Pulse 87   Temp 98.1 °F (36.7 °C) (Oral)   Resp 18   Ht 6' 2\" (1.88 m)   Wt 234 lb 8 oz (106.4 kg)   SpO2 97%   BMI 30.11 kg/m²   Temp (24hrs), Av.1 °F (36.7 °C), Min:97.9 °F (36.6 °C), Max:98.2 °F (36.8 °C)       Intake/Output Summary (Last 24 hours) at 2025 1311  Last data filed at 2025 0341  Gross per 24 hour   Intake 837 ml   Output 500 ml   Net 337 ml     Wt Readings from Last 3 Encounters:   25 234 lb 8 oz (106.4 kg)   25 229 lb 14.4 oz (104.3 kg)   25 240 lb (108.9 kg)       General: no acute distress  HEENT: normocephalic, atraumatic  Respiratory: no distress  Extremities: no edema bilaterally  Neuro: awake, alert     LABORATORY DATA:     Lab Results   Component Value Date     (H) 2025    BUN 45 (H) 2025    BUNCREA 13.0 2022    CREATSERUM 8.41 (H) 2025    ANIONGAP 10 2025    GFR 59 (L) 2018    GFRNAA 30 (L) 2022    GFRAA 35 (L) 2022    CA 9.4 2025    OSMOCALC 300 (H) 2025    ALKPHO 112 2025    AST 53 (H) 2025    ALT 84 (H) 2025    BILT 0.4 2025    TP 7.4 2025    ALB 4.4 2025    GLOBULIN 3.3 2025     2025    K 4.1 2025     2025    CO2 26.0 2025     Lab Results   Component Value Date    WBC 5.1 2025    RBC 2.92 (L) 2025    HGB 9.1 (L) 2025    HCT 26.5 (L) 2025    .0 2025    MPV 11.5 2012    MCV 90.8 2025    MCH 31.2 2025    MCHC 34.3 2025    RDW 12.5 2025     NEPRELIM 2.43 04/29/2025    NEPERCENT 47.5 04/29/2025    LYPERCENT 31.3 04/29/2025    MOPERCENT 14.7 04/29/2025    EOPERCENT 4.7 04/29/2025    BAPERCENT 1.6 04/29/2025    NE 2.43 04/29/2025    LYMABS 1.60 04/29/2025    MOABSO 0.75 04/29/2025    EOABSO 0.24 04/29/2025    BAABSO 0.08 04/29/2025     Lab Results   Component Value Date    MALBP 167.00 05/24/2023    CREUR 142.00 05/24/2023    CREUR 142.00 05/24/2023     Lab Results   Component Value Date    COLORUR Light-Yellow 03/30/2025    CLARITY Clear 03/30/2025    SPECGRAVITY 1.028 03/30/2025    GLUUR Normal 03/30/2025    BILUR Negative 03/30/2025    KETUR Negative 03/30/2025    BLOODURINE 2+ (A) 03/30/2025    PHURINE 6.5 03/30/2025    PROUR 100 (A) 03/30/2025    UROBILINOGEN Normal 03/30/2025    NITRITE Negative 03/30/2025    LEUUR Negative 03/30/2025    WBCUR 1-5 03/30/2025    RBCUR 0-2 03/30/2025    EPIUR Few (A) 03/30/2025    BACUR None Seen 03/30/2025    CAOXUR Occasional (A) 04/20/2018    HYLUR Present (A) 05/14/2019       IMAGING:     Reviewed    ASSESSMENT:      # ESRD on HD  -MWF schedule outpatient  -Cholo Sanchez  -L AVF with thrombus - plan for revision as outpatient  -S/p TDC placement 4/22/2025     # HTN/Volume Status  -Home medications: carvedilol, hydralazine, losartan, nifedipine, clonidine     # Anemia     # Secondary Hyperparathyroidism     PLAN:      -HD per MWF schedule  -UF with HD as tolerated  -Continue home BP medications   -Defer OWEN in setting of high BPs  -Anemia management per primary  -Continue sevelamer, calcium acetate  -Vascular surgery following for thrombus, plan for outpatient revision in couple of weeks.   -Avoid nephrotoxins and renally dose medications for iHD 3x/weekly  -Monitor intake and output daily  -Daily weights    Okay for discharge from nephrology perspective.     Thank you for allowing us to participate in the care of this patient.       Samantha Muñoz, DO [facility] - Nephrology           [1]   Current  Facility-Administered Medications   Medication Dose Route Frequency    albuterol (Ventolin HFA) 108 (90 Base) MCG/ACT inhaler 2 puff  2 puff Inhalation Q6H PRN    ARIPiprazole (Abilify) tab 15 mg  15 mg Oral Daily    atorvastatin (Lipitor) tab 20 mg  20 mg Oral QPM    buPROPion ER (Wellbutrin XL) 24 hr tab 150 mg  150 mg Oral Daily    calcium acetate (Phoslo) cap 667 mg  667 mg Oral TID with meals    carvedilol (Coreg) tab 25 mg  25 mg Oral BID with meals    cloNIDine (Catapres) tab 0.1 mg  0.1 mg Oral BID    cyclobenzaprine (Flexeril) tab 5 mg  5 mg Oral TID PRN    ferrous sulfate DR tab 325 mg  325 mg Oral Daily with breakfast    FLUoxetine (PROzac) cap 40 mg  40 mg Oral Daily    gabapentin (Neurontin) cap 200 mg  200 mg Oral TID    hydrALAZINE (Apresoline) tab 25 mg  25 mg Oral TID    lamoTRIgine (LaMICtal) tab 100 mg  100 mg Oral Daily    losartan (Cozaar) tab 100 mg  100 mg Oral Daily    NIFEdipine ER (Procardia-XL) 24 hr tab 30 mg  30 mg Oral Daily    pantoprazole (Protonix) DR tab 40 mg  40 mg Oral QAM AC    sevelamer carbonate (Renvela) tab 2,400 mg  2,400 mg Oral TID CC    tamsulosin (Flomax) cap 0.4 mg  0.4 mg Oral Daily    melatonin tab 3 mg  3 mg Oral Nightly PRN    acetaminophen (Tylenol) tab 650 mg  650 mg Oral Q4H PRN    Or    HYDROcodone-acetaminophen (Norco) 5-325 MG per tab 1 tablet  1 tablet Oral Q4H PRN    Or    HYDROcodone-acetaminophen (Norco) 5-325 MG per tab 2 tablet  2 tablet Oral Q4H PRN    ondansetron (Zofran) 4 MG/2ML injection 4 mg  4 mg Intravenous Q6H PRN    prochlorperazine (Compazine) 10 MG/2ML injection 5 mg  5 mg Intravenous Q8H PRN    hydrALAzine (Apresoline) 20 mg/mL injection 10 mg  10 mg Intravenous Q6H PRN    HYDROmorphone (Dilaudid) 1 MG/ML injection 0.5 mg  0.5 mg Intravenous Once    heparin (Porcine) 5000 UNIT/ML injection 5,000 Units  5,000 Units Subcutaneous Q8H MARTHA    sodium chloride 0.9 % IV bolus 100 mL  100 mL Intravenous Q30 Min PRN    And    albumin human (Albumin)  25% injection 25 g  25 g Intravenous PRN Dialysis

## 2025-04-29 NOTE — PROGRESS NOTES
[facility]   part of [facility]  Progress Note      Godwin Fonseca Patient Status:  Observation    1980 MRN [id number]   Location [facility] 4NW-A Attending Regina Salmon MD   Hosp Day # 0 PCP Adrian Small MD       Subjective:   Has ongoing pain in left neck region as well as hoarse voice.  No fever but feels sweaty frequently    Objective:   Dressing c/d/I.  Minimal swelling around tube insertion site.  No oozing/bleeding and no warmth or redness    Vital Signs:  Blood pressure 108/78, pulse 92, temperature 98 °F (36.7 °C), temperature source Oral, resp. rate 20, height 74\", weight 234 lb 8 oz, SpO2 94%.    Input/Output:    Intake/Output Summary (Last 24 hours) at 2025 1439  Last data filed at 2025 0341  Gross per 24 hour   Intake 720 ml   Output 500 ml   Net 220 ml     24 hour drain output: N/A    Results:   Labs:  Lab Results   Component Value Date    WBC 5.1 2025    RBC 2.92 2025    HGB 9.1 2025    HCT 26.5 2025    MCV 90.8 2025    MCH 31.2 2025    MCHC 34.3 2025    RDW 12.5 2025    .0 2025       Microbiology:  N/A    Assessment and Plan:   44 yo M w/ prior tunneled HD cath placement of LUE, swelling in L neck area  -  May be due to small hematoma likely present on prior US head and neck.  No facial swelling but could consider US Doppler LUE to exclude new DVT  -  Catheter functioning well in HD  -  Unable to place R int jug tunneled HD cath given stenosis of R int jug vein during last procedure.  Given these findings would be loathe to remove existing L tunneled cath as alternative options are few and may be more prone to infection (groin catheter, etc)  - Ultimately may need revision of fistula, would check with vascular surg regarding his long term options for HD       Smith Randle MD  2025  2:39 PM

## 2025-04-29 NOTE — PROGRESS NOTES
[facility] Hospitalist Progress Note       SUBJECTIVE:  Having some pain around tunneled catheter site no erythema no drainage no fevers      OBJECTIVE:  Scheduled Meds: Scheduled Medications[1]  Continuous Infusions: Medication Infusions[2]  PRN Meds: PRN Medications[3]    Vitals  @3VITALS@     Exam   Gen-    no acute distress, alert and oriented x 3   RESP-   Lungs CTA, normal respiratory effort  CV-      Heart RRR, no mgr  Abd-    soft, nondistended, nontender, bowel sounds present  Skin-    no rash  Neuro-  no focal neurologic deficits  Ext-      No edema in extremities   Psych- alert and oriented x 3     Labs:     Recent Labs   Lab 04/23/25  0756 04/27/25  0537 04/28/25  0631 04/29/25  0606   WBC 5.0 6.1 5.2 5.1   HGB 8.2* 8.3* 7.8* 9.1*   MCV 94.2 91.1 98.8 90.8   .0 167.0 153.0 205.0       Recent Labs   Lab 04/23/25  0756 04/24/25  0738 04/27/25  0537 04/28/25  0631 04/29/25  0606    140 137 140 139   K 3.7 4.0 4.2 4.4 4.1    101 105 107 103   CO2 28.0 26.0 23.0 20.0* 26.0   BUN 45* 34* 70* 75* 45*   CREATSERUM 8.05* 7.14* 10.47* 11.60* 8.41*   CA 9.0 9.7 9.1 9.0 9.4   MG 2.1 2.2  --  2.3 2.2   PHOS 5.8* 6.4*  --  5.9* 5.9*   * 114* 74 96 106*       Recent Labs   Lab 04/23/25  0756 04/24/25  0738 04/28/25  0631 04/29/25  0606   ALB 4.1 4.3 3.9 4.4       No results for input(s): \"PGLU\" in the last 168 hours.    AP:  45-year-old male with past medical history of ESRD on HD, HFpEF, hypertension, type 2 diabetes, CAD, COPD, history of recurrent admissions, bipolar disorder who presents to the hospital for evaluation of PermCath and concern for infection.     Concern for PermCath infection-likely ruled out  ESRD on HD  No evidence of fevers, white count elevation, chest x-ray with no acute findings, patient having no overt tenderness, redness, drainage from the site  Plan:  - Nephrology consulted for further assistance and evaluation of PermCath site along with any dialysis needs  -Ultrasound of  the neck to evaluate PermCath - small hematoma noted, d/w IR will see pt however unlikley it will need to be replaced   - Cultures from PermCath, not from skin site, follow  - As needed pain control, continue gabapentin  -Continue PhosLo, sevelamer     HFpEF  Hypertension  CAD  - Continue Coreg, clonidine, losartan, nifedipine, hydralazine statin     MDD/HILARIO/BPD  - Continue Abilify, Prozac, Lamictal, Wellbutrin     EMILY  - Continue ferrous sulfate                 Regina Salmon MD             [1]    ARIPiprazole  15 mg Oral Daily    atorvastatin  20 mg Oral QPM    buPROPion ER  150 mg Oral Daily    calcium acetate  667 mg Oral TID with meals    carvedilol  25 mg Oral BID with meals    cloNIDine  0.1 mg Oral BID    ferrous sulfate  325 mg Oral Daily with breakfast    FLUoxetine HCl  40 mg Oral Daily    gabapentin  200 mg Oral TID    hydrALAZINE  25 mg Oral TID    lamoTRIgine  100 mg Oral Daily    losartan  100 mg Oral Daily    NIFEdipine ER  30 mg Oral Daily    pantoprazole  40 mg Oral QAM AC    sevelamer carbonate  2,400 mg Oral TID CC    tamsulosin  0.4 mg Oral Daily    HYDROmorphone  0.5 mg Intravenous Once    heparin  5,000 Units Subcutaneous Q8H MARTHA   [2] [3]   albuterol    cyclobenzaprine    melatonin    acetaminophen **OR** HYDROcodone-acetaminophen **OR** HYDROcodone-acetaminophen    ondansetron    prochlorperazine    hydrALAzine    sodium chloride **AND** albumin human

## 2025-04-29 NOTE — PLAN OF CARE
Pt received A&Ox4. VSS. RA. Tele. Afebrile. Denies pain. Medications given per MAR. Call light within reach.    Problem: PAIN - ADULT  Goal: Verbalizes/displays adequate comfort level or patient's stated pain goal  Description: INTERVENTIONS:- Encourage pt to monitor pain and request assistance- Assess pain using appropriate pain scale- Administer analgesics based on type and severity of pain and evaluate response- Implement non-pharmacological measures as appropriate and evaluate response- Consider cultural and social influences on pain and pain management- Manage/alleviate anxiety- Utilize distraction and/or relaxation techniques- Monitor for opioid side effects- Notify MD/LIP if interventions unsuccessful or patient reports new pain- Anticipate increased pain with activity and pre-medicate as appropriate  Outcome: Progressing

## 2025-04-29 NOTE — PLAN OF CARE
Pt received A&Ox4. VSS. Afebrile. C/o pain in neck by cath. MD aware. Pt refusing PRN norco. All meds per MAR. PIV saline locked per orders. Tolerating diet. Up ambulating in room. US doppler complete of LUE. See results. Updated on POC. Call light within reach.    Problem: PAIN - ADULT  Goal: Verbalizes/displays adequate comfort level or patient's stated pain goal  Description: INTERVENTIONS:- Encourage pt to monitor pain and request assistance- Assess pain using appropriate pain scale- Administer analgesics based on type and severity of pain and evaluate response- Implement non-pharmacological measures as appropriate and evaluate response- Consider cultural and social influences on pain and pain management- Manage/alleviate anxiety- Utilize distraction and/or relaxation techniques- Monitor for opioid side effects- Notify MD/LIP if interventions unsuccessful or patient reports new pain- Anticipate increased pain with activity and pre-medicate as appropriate  Outcome: Progressing

## 2025-04-30 VITALS
RESPIRATION RATE: 18 BRPM | HEIGHT: 74 IN | OXYGEN SATURATION: 100 % | BODY MASS INDEX: 30.46 KG/M2 | HEART RATE: 80 BPM | TEMPERATURE: 97 F | SYSTOLIC BLOOD PRESSURE: 124 MMHG | WEIGHT: 237.38 LBS | DIASTOLIC BLOOD PRESSURE: 73 MMHG

## 2025-04-30 LAB
ALBUMIN SERPL-MCNC: 4.3 G/DL (ref 3.2–4.8)
ANION GAP SERPL CALC-SCNC: 12 MMOL/L (ref 0–18)
BUN BLD-MCNC: 57 MG/DL (ref 9–23)
CALCIUM BLD-MCNC: 9.6 MG/DL (ref 8.7–10.6)
CHLORIDE SERPL-SCNC: 103 MMOL/L (ref 98–112)
CO2 SERPL-SCNC: 24 MMOL/L (ref 21–32)
CREAT BLD-MCNC: 10.62 MG/DL (ref 0.7–1.3)
EGFRCR SERPLBLD CKD-EPI 2021: 5 ML/MIN/1.73M2 (ref 60–?)
GLUCOSE BLD-MCNC: 136 MG/DL (ref 70–99)
MAGNESIUM SERPL-MCNC: 2.3 MG/DL (ref 1.6–2.6)
OSMOLALITY SERPL CALC.SUM OF ELEC: 306 MOSM/KG (ref 275–295)
PHOSPHATE SERPL-MCNC: 6.4 MG/DL (ref 2.4–5.1)
POTASSIUM SERPL-SCNC: 4.1 MMOL/L (ref 3.5–5.1)
SODIUM SERPL-SCNC: 139 MMOL/L (ref 136–145)

## 2025-04-30 PROCEDURE — 96372 THER/PROPH/DIAG INJ SC/IM: CPT

## 2025-04-30 PROCEDURE — 80069 RENAL FUNCTION PANEL: CPT | Performed by: INTERNAL MEDICINE

## 2025-04-30 PROCEDURE — 90935 HEMODIALYSIS ONE EVALUATION: CPT | Performed by: STUDENT IN AN ORGANIZED HEALTH CARE EDUCATION/TRAINING PROGRAM

## 2025-04-30 PROCEDURE — 83735 ASSAY OF MAGNESIUM: CPT | Performed by: INTERNAL MEDICINE

## 2025-04-30 RX ORDER — HEPARIN SODIUM 1000 [USP'U]/ML
1.5 INJECTION, SOLUTION INTRAVENOUS; SUBCUTANEOUS
Status: COMPLETED | OUTPATIENT
Start: 2025-04-30 | End: 2025-04-30

## 2025-04-30 NOTE — PROGRESS NOTES
[facility] - Nephrology  Inpatient Follow-up    Godwin Fonseca Patient Status:  Observation    1980 MRN [id number]   Location [facility] 4NW-A Attending Regina Salmon MD   Hosp Day # 0 PCP Adrian Small MD       SUBJECTIVE:     Patient seen and examined at bedside.     MEDICATIONS:     Current Hospital Medications[1]    PHYSICAL EXAM:     Vital Signs: /76 (BP Location: Right arm)   Pulse 75   Temp 98.3 °F (36.8 °C) (Oral)   Resp 18   Ht 6' 2\" (1.88 m)   Wt 237 lb 6.4 oz (107.7 kg)   SpO2 100%   BMI 30.48 kg/m²   Temp (24hrs), Av.2 °F (36.8 °C), Min:98 °F (36.7 °C), Max:98.3 °F (36.8 °C)       Intake/Output Summary (Last 24 hours) at 2025 1128  Last data filed at 2025 0759  Gross per 24 hour   Intake 960 ml   Output --   Net 960 ml     Wt Readings from Last 3 Encounters:   25 237 lb 6.4 oz (107.7 kg)   25 229 lb 14.4 oz (104.3 kg)   25 240 lb (108.9 kg)       General: no acute distress  HEENT: normocephalic, atraumatic  Respiratory: no distress  Extremities: no edema bilaterally  Neuro: awake, alert     LABORATORY DATA:     Lab Results   Component Value Date     (H) 2025    BUN 57 (H) 2025    BUNCREA 13.0 2022    CREATSERUM 10.62 (H) 2025    ANIONGAP 12 2025    GFR 59 (L) 2018    GFRNAA 30 (L) 2022    GFRAA 35 (L) 2022    CA 9.6 2025    OSMOCALC 306 (H) 2025    ALKPHO 112 2025    AST 53 (H) 2025    ALT 84 (H) 2025    BILT 0.4 2025    TP 7.4 2025    ALB 4.3 2025    GLOBULIN 3.3 2025     2025    K 4.1 2025     2025    CO2 24.0 2025     Lab Results   Component Value Date    WBC 5.1 2025    RBC 2.92 (L) 2025    HGB 9.1 (L) 2025    HCT 26.5 (L) 2025    .0 2025    MPV 11.5 2012    MCV 90.8 2025    MCH 31.2 2025    MCHC 34.3 2025    RDW 12.5 2025     NEPRELIM 2.43 04/29/2025    NEPERCENT 47.5 04/29/2025    LYPERCENT 31.3 04/29/2025    MOPERCENT 14.7 04/29/2025    EOPERCENT 4.7 04/29/2025    BAPERCENT 1.6 04/29/2025    NE 2.43 04/29/2025    LYMABS 1.60 04/29/2025    MOABSO 0.75 04/29/2025    EOABSO 0.24 04/29/2025    BAABSO 0.08 04/29/2025     Lab Results   Component Value Date    MALBP 167.00 05/24/2023    CREUR 142.00 05/24/2023    CREUR 142.00 05/24/2023     Lab Results   Component Value Date    COLORUR Light-Yellow 03/30/2025    CLARITY Clear 03/30/2025    SPECGRAVITY 1.028 03/30/2025    GLUUR Normal 03/30/2025    BILUR Negative 03/30/2025    KETUR Negative 03/30/2025    BLOODURINE 2+ (A) 03/30/2025    PHURINE 6.5 03/30/2025    PROUR 100 (A) 03/30/2025    UROBILINOGEN Normal 03/30/2025    NITRITE Negative 03/30/2025    LEUUR Negative 03/30/2025    WBCUR 1-5 03/30/2025    RBCUR 0-2 03/30/2025    EPIUR Few (A) 03/30/2025    BACUR None Seen 03/30/2025    CAOXUR Occasional (A) 04/20/2018    HYLUR Present (A) 05/14/2019       IMAGING:     Reviewed    ASSESSMENT:      # ESRD on HD  -MWF schedule outpatient  -Cholo Sanchez  -L AVF with thrombus - plan for revision as outpatient  -S/p TDC placement 4/22/2025     # HTN/Volume Status  -Home medications: carvedilol, hydralazine, losartan, nifedipine, clonidine     # Anemia     # Secondary Hyperparathyroidism     PLAN:      -HD per MWF schedule  -UF with HD as tolerated  -Continue home BP medications   -Defer OWEN in setting of high BPs  -Anemia management per primary  -Continue sevelamer, calcium acetate  -Vascular surgery following for thrombus, plan for outpatient revision in couple of weeks.   -Avoid nephrotoxins and renally dose medications for iHD 3x/weekly  -Monitor intake and output daily  -Daily weights    Okay for discharge from nephrology perspective.     Thank you for allowing us to participate in the care of this patient.       Samantha Muñoz, DO [facility] - Nephrology           [1]   Current  Facility-Administered Medications   Medication Dose Route Frequency    heparin (Porcine) 1000 UNIT/ML injection 1,500 Units  1.5 mL Intracatheter Once    albuterol (Ventolin HFA) 108 (90 Base) MCG/ACT inhaler 2 puff  2 puff Inhalation Q6H PRN    ARIPiprazole (Abilify) tab 15 mg  15 mg Oral Daily    atorvastatin (Lipitor) tab 20 mg  20 mg Oral QPM    buPROPion ER (Wellbutrin XL) 24 hr tab 150 mg  150 mg Oral Daily    calcium acetate (Phoslo) cap 667 mg  667 mg Oral TID with meals    carvedilol (Coreg) tab 25 mg  25 mg Oral BID with meals    cloNIDine (Catapres) tab 0.1 mg  0.1 mg Oral BID    cyclobenzaprine (Flexeril) tab 5 mg  5 mg Oral TID PRN    ferrous sulfate DR tab 325 mg  325 mg Oral Daily with breakfast    FLUoxetine (PROzac) cap 40 mg  40 mg Oral Daily    gabapentin (Neurontin) cap 200 mg  200 mg Oral TID    hydrALAZINE (Apresoline) tab 25 mg  25 mg Oral TID    lamoTRIgine (LaMICtal) tab 100 mg  100 mg Oral Daily    losartan (Cozaar) tab 100 mg  100 mg Oral Daily    NIFEdipine ER (Procardia-XL) 24 hr tab 30 mg  30 mg Oral Daily    pantoprazole (Protonix) DR tab 40 mg  40 mg Oral QAM AC    sevelamer carbonate (Renvela) tab 2,400 mg  2,400 mg Oral TID CC    tamsulosin (Flomax) cap 0.4 mg  0.4 mg Oral Daily    melatonin tab 3 mg  3 mg Oral Nightly PRN    acetaminophen (Tylenol) tab 650 mg  650 mg Oral Q4H PRN    Or    HYDROcodone-acetaminophen (Norco) 5-325 MG per tab 1 tablet  1 tablet Oral Q4H PRN    Or    HYDROcodone-acetaminophen (Norco) 5-325 MG per tab 2 tablet  2 tablet Oral Q4H PRN    ondansetron (Zofran) 4 MG/2ML injection 4 mg  4 mg Intravenous Q6H PRN    prochlorperazine (Compazine) 10 MG/2ML injection 5 mg  5 mg Intravenous Q8H PRN    hydrALAzine (Apresoline) 20 mg/mL injection 10 mg  10 mg Intravenous Q6H PRN    HYDROmorphone (Dilaudid) 1 MG/ML injection 0.5 mg  0.5 mg Intravenous Once    heparin (Porcine) 5000 UNIT/ML injection 5,000 Units  5,000 Units Subcutaneous Q8H MARTHA

## 2025-04-30 NOTE — DISCHARGE INSTRUCTIONS
Per Dr. Randle (IR): 44 yo M w/ prior tunneled HD cath placement of LUE, swelling in L neck area  -  May be due to small hematoma likely present on prior US head and neck.  No facial swelling but could consider US Doppler LUE to exclude new DVT  -  Catheter functioning well in HD  -  Unable to place R int jug tunneled HD cath given stenosis of R int jug vein during last procedure.  Given these findings would be loathe to remove existing L tunneled cath as alternative options are few and may be more prone to infection (groin catheter, etc)  - Ultimately may need revision of fistula, would check with vascular surg regarding his long term options for HD

## 2025-04-30 NOTE — PLAN OF CARE
Pt received A&O x 4. O2 sat above 90% on RA. Call light within reach. Safety precautions in place.     0430: Pt c/o of severe pain in HD catheter on chest. PRN Norco given with mild relief.    Problem: PAIN - ADULT  Goal: Verbalizes/displays adequate comfort level or patient's stated pain goal  Description: INTERVENTIONS:- Encourage pt to monitor pain and request assistance- Assess pain using appropriate pain scale- Administer analgesics based on type and severity of pain and evaluate response- Implement non-pharmacological measures as appropriate and evaluate response- Consider cultural and social influences on pain and pain management- Manage/alleviate anxiety- Utilize distraction and/or relaxation techniques- Monitor for opioid side effects- Notify MD/LIP if interventions unsuccessful or patient reports new pain- Anticipate increased pain with activity and pre-medicate as appropriate  Outcome: Progressing

## 2025-04-30 NOTE — DISCHARGE SUMMARY
[facility] [place] Hospitalist Discharge Summary    Patient ID  Godwin Fonseca  [id number]  45 year old  4/12/1980  Admit date: 4/27/2025  Discharge date: 4/30/25  Attending: Regina Salmon MD   Primary Care Physician: Adrian Small MD   Reason for admission:  (see HPI on HP for further detail)  Discharge condition: stable   Disposition: home    Important follow up:  -PCP and vascular    Discharge med list     Medication List        START taking these medications      tiotropium 18 MCG Caps  Commonly known as: Spiriva Handihaler            CHANGE how you take these medications      HYDROcodone-acetaminophen 5-325 MG Tabs  Commonly known as: Higden  What changed: Another medication with the same name was removed. Continue taking this medication, and follow the directions you see here.            CONTINUE taking these medications      albuterol 108 (90 Base) MCG/ACT Aers  Commonly known as: Ventolin HFA     * ARIPiprazole 5 MG Tabs  Commonly known as: Abilify     * ARIPiprazole 10 MG Tabs  Commonly known as: Abilify     atorvastatin 20 MG Tabs  Commonly known as: Lipitor     benzonatate 200 MG Caps  Commonly known as: Tessalon  Take 1 capsule (200 mg total) by mouth 3 (three) times daily as needed for cough.     buPROPion  MG Tb24  Commonly known as: Wellbutrin XL     calcium acetate 667 MG Caps  Commonly known as: Phoslo  Take 1 capsule (667 mg total) by mouth with breakfast, with lunch, and with evening meal.     carvedilol 25 MG Tabs  Commonly known as: Coreg  Take 1 tablet (25 mg total) by mouth in the morning and 1 tablet (25 mg total) in the evening. Take with meals.     cloNIDine 0.1 MG Tabs  Commonly known as: Catapres  Take 1 tablet (0.1 mg total) by mouth 2 (two) times daily.     cyclobenzaprine 5 MG Tabs  Commonly known as: Flexeril     ergocalciferol 1.25 MG (09006 UT) Caps  Commonly known as: Vitamin D2     ferrous sulfate 325 (65 FE) MG Tbec     FLUoxetine HCl 40 MG Caps  Commonly known as: PROZAC  Take 1 capsule (40  mg total) by mouth daily.     fluticasone propionate 50 MCG/ACT Susp  Commonly known as: Flonase  SPRAY ONCE INTO EACH NOSTRIL BID PRN     gabapentin 100 MG Caps  Commonly known as: Neurontin  Take 2 capsules (200 mg total) by mouth 3 (three) times daily.     hydrALAZINE 25 MG Tabs  Commonly known as: Apresoline     lamoTRIgine 100 MG Tabs  Commonly known as: LaMICtal     losartan 100 MG Tabs  Commonly known as: Cozaar     NIFEdipine ER 30 MG Tb24  Commonly known as: Adalat CC  Take 1 tablet (30 mg total) by mouth daily.     ondansetron 4 MG Tbdp  Commonly known as: Zofran-ODT     pantoprazole 40 MG Tbec  Commonly known as: Protonix     Polyethylene Glycol 3350 17 g Pack  Commonly known as: MIRALAX     sevelamer carbonate 800 MG Tabs  Commonly known as: Renvela  Take 3 tablets (2,400 mg total) by mouth 3 (three) times daily with meals.     tamsulosin 0.4 MG Caps  Commonly known as: Flomax     Vyvanse 60 MG Caps  Generic drug: Lisdexamfetamine Dimesylate           * This list has 2 medication(s) that are the same as other medications prescribed for you. Read the directions carefully, and ask your doctor or other care provider to review them with you.                STOP taking these medications      Preparation H 0.25 % Supp  Generic drug: Phenylephrine HCl              Discharge Diagnoses/Hospital course:   45-year-old male with past medical history of ESRD on HD, HFpEF, hypertension, type 2 diabetes, CAD, COPD, history of recurrent admissions, bipolar disorder who presents to the hospital for evaluation of PermCath and concern for infection.     Concern for PermCath infection-likely ruled out  ESRD on HD  No evidence of fevers, white count elevation, chest x-ray with no acute findings, patient having no overt tenderness, redness, drainage from the site  Plan:  - Nephrology consulted for further assistance and evaluation of PermCath site along with any dialysis needs  -Ultrasound of the neck to evaluate PermCath -  small hematoma noted, d/w IR no need to change it out b/c working well and no s/s of infectin, d/w vascular will fix fistula as outpt, d/w pt      - As needed pain control, continue gabapentin  -Continue PhosLo, sevelamer     HFpEF  Hypertension  CAD  - Continue Coreg, clonidine, losartan, nifedipine, hydralazine statin     MDD/HILARIO/BPD  - Continue Abilify, Prozac, Lamictal, Wellbutrin     EMILY  - Continue ferrous sulfate    Consults:  IP CONSULT TO HOSPITALIST  IP CONSULT TO NEPHROLOGY    Radiology:  US VENOUS DOPPLER ARM LEFT - DIAG IMG (CPT=93971)  Result Date: 4/29/2025  PROCEDURE:  US VENOUS DOPPLER ARM LEFT - DIAG IMG (CPT=93971)  COMPARISON:  US [facility], US VENOUS DOPPLER ARM LEFT - DIAG IMG (CPT=93971), 4/04/2025, 5:49 PM.  INDICATIONS:  Left upper extremity pain and swelling, neck swelling  TECHNIQUE:  Real time, grey scale, and duplex ultrasound was used to evaluate the upper extremity venous system. B-mode two-dimensional images of the vascular structures, Doppler spectral analysis, and color flow.  Doppler imaging were performed.  The following veins were imaged:  Subclavian, Jugular, Axillary, Brachial, Basilic, Cephalic, and the contralateral Subclavian and Jugular.  PATIENT STATED HISTORY: (As transcribed by Technologist)     FINDINGS:  EXTREMITY:  Left upper extremity THROMBI:  None visible. COMPRESSION:  Normal compressibility, phasicity, and augmentation of the subclavian, jugular, axillary, brachial, basilic, and cephalic veins. OTHER:  Negative.            CONCLUSION:  No evidence of deep venous thrombosis of the left upper extremity   LOCATION:  [facility]   Dictated by (CST): Claude Singh MD on 4/29/2025 at 5:31 PM     Finalized by (CST): Claude Singh MD on 4/29/2025 at 5:32 PM       US HEAD/NECK (CPT=76536)  Result Date: 4/28/2025  PROCEDURE:  US HEAD/NECK (CPT=76536)  COMPARISON:  [facility] , XS, IR PERMANENT CATHETER INSERTION EXCHNGE CHECK, 4/22/2025, 8:57 AM.  INDICATIONS:  Palpable lump superior  to PermCath insertion site.  TECHNIQUE:  Sonography was performed of the clinically requested area of interest.  PATIENT STATED HISTORY: (As transcribed by Technologist)     FINDINGS:  MASSES:  There is a hypoechoic lobulated structure within the subcutaneous tissue of the left neck along the region the palpable abnormality measuring 11 x 6 mm x 7 mm in size.  There is no flow within this lesion.  This could represent a focus of scar tissue or possibly a small hematoma. FLUID COLLECTIONS:  None. OTHER:  Negative.            CONCLUSION:  Hypoechoic nodular density correspond to palpable abnormality within the left neck subcutaneous tissues could represent a small hematoma or nodular focus of scar tissue.   LOCATION:  [facility]   Dictated by (CST): Jaycob Cassidy MD on 4/28/2025 at 5:00 PM     Finalized by (CST): Jaycob Cassidy MD on 4/28/2025 at 5:04 PM       XR CHEST AP PORTABLE  (CPT=71045)  Result Date: 4/27/2025  PROCEDURE:  XR CHEST AP PORTABLE  (CPT=71045)  TECHNIQUE:  AP chest radiograph was obtained.  COMPARISON:  [facility], XR, XR CHEST AP PORTABLE  (CPT=71045), 4/19/2025, 10:33 PM.  INDICATIONS:  New dialysis catheter with swelling and pain. Placed Thursday  PATIENT STATED HISTORY: (As transcribed by Technologist)    New dialysis catheter with swelling and pain. Placed Thursday .             CONCLUSION:  Central venous catheter tips in SVC.  Stable airspace disease and effusion in the right midlung and base.  Stable heart size and pulmonary vascularity.  No pneumothorax.   LOCATION:  [facility]      Dictated by (CST): Arlyn Oglesby MD on 4/27/2025 at 7:34 AM     Finalized by (CST): Arlyn Oglesby MD on 4/27/2025 at 7:35 AM       US ARTERIAL DUPLEX UPPER EXTREMITY LEFT (CPT=93931)  Result Date: 4/23/2025  PROCEDURE:  US ARTERIAL DUPLEX UPPER EXTREMITY LEFT (CPT=93931)  COMPARISON:  None.  INDICATIONS:  Dialysis fistula malfunction  TECHNIQUE:  Real time grey scale and duplex ultrasound was used to evaluate the  upper extremity arterial system. B-mode two dimensional images of the vascular structures, Doppler spectral analysis, and color flow Doppler imaging were performed.  PATIENT STATED HISTORY: (As transcribed by Technologist)  Unsuccessful dialysis access.    FINDINGS:  There is a radiocephalic fistula in the left upper extremity.  The radial artery is patent with flow velocity measuring up to 237 centimeters/second proximal to the anastomosis and cell approximately 77 centimeters/second distal to the anastomosis.  Arterial venous anastomosis is widely patent with flow rate measuring approximately 160 centimeters per 2nd.  The venous outflow is patent proximally, though appears occluded at the level of the elbow with intramural thrombus spanning a segment of approximately 2.6 centimeters.             CONCLUSION:  1. Left upper extremity fistula.  Approximately 2.6 cm segment of thrombus within the venous outflow tract the level of the elbow.  This corresponds to the recent fistulagram findings. 2. Arterial venous anastomosis is patent.   [facility]    Dictated by (CST): Duy De La Rosa MD on 4/23/2025 at 3:25 PM     Finalized by (CST): Duy De La Rosa MD on 4/23/2025 at 3:29 PM       IR CENTRAL VENOUS ACCESS  Result Date: 4/22/2025            PROCEDURE:  IR PERMANENT CATHETER INSERTION EXCHNGE CHECK  INDICATIONS:  ESRD  TECHNIQUE:  Prior to the procedure, I discussed with the patient and/or legal representative the potential benefits, risks, and side effects of this procedure, the likelihood of the patient achieving goals; and the potential problems that might occur during recuperation.  I discussed reasonable alternatives to the procedure, including risks, benefits, steps to prevent infection and side effects related to the alternatives, and risks related to not receiving this procedure. A witnessed verbal and signed consent was obtained and documented in the patient's chart.  IV was checked and maintained by the  nurse. Moderate conscious sedation was performed under continuous pulse oximetry and cardiac monitoring under my direct supervision.  The radiology nurse was in attendance throughout the exam. Moderate conscious sedation of 2 mg Versed and 100 mcg of Fentanyl was given.  Patient was assessed and monitoring of oxygen saturation, heart rate, and blood pressure by the nursing staff and myself during the exam for a total intraservice time of 12 minutes of conscious sedation time from 915 to 927.  Two grams of Ancef were given pre-procedurally via existing IV.  The patient was placed supine on the angiographic table and the left chest and neck was prepped and covered with a full body drape in the usual sterile fashion.  All operators present for the case performed standard pre-procedural prep including hand washing, sterile gloves, gown, mask, and cap.  All aspects of the maximum sterile barrier technique were followed.  A preprocedural time out was performed with all physicians, technologists, and nurses involved with the procedure. Ultrasound of the right neck demonstrated a severely stenotic right internal jugular vein. Sonography of the left  neck demonstrated a patent internal jugular vein and a permanent image was obtained.  Under sonographic guidance, the left   internal jugular vein was accessed using a micropuncture system.  A guidewire was advanced into the IVC under fluoroscopic guidance.  Using blunt dissection a dual lumen tunneled catheter was placed via a peel-away sheath.  Both lumens flushed and returned easily.  The catheter was secured and heparinized.  A small amount of Dermabond was placed at the neck venotomy site.   Sterile gauze and transparent dressing was applied.  The patient tolerated the procedure well and there were no immediate complications. Routine post tunneled catheter insertion instructions were communicated to the patient.  ESTIMATED BLOOD LOSS: Less than 5cc.  FLUORO TIME/DOSE:  0.4  minutes  AIR KERMA:  5.84 mGy  IMPRESSION: Successful placement of left internal jugular vein tunneled dialysis catheter ( 27 cm tip to cuff Bard HemoSplit catheter)   LOCATION:  [facility]    Dictated by (CST): Duy De La Rosa MD on 4/22/2025 at 10:16 AM     Finalized by (CST): Duy De La Rosa MD on 4/22/2025 at 10:18 AM       IR GRAFT PROCEDURE  Result Date: 4/21/2025  PROCEDURE:  IR AV GRAFT DECLOT CHECK  INDICATIONS:  Left arm swelling  TECHNIQUE: Prior to the procedure, I discussed with the patient and/or legal representative the potential benefits, risks, and side effects of this procedure, the likelihood of the patient achieving goals; and the potential problems that might occur during recuperation.  I discussed reasonable alternatives to the procedure, including risks, benefits, steps to prevent infection and side effects related to the alternatives, and risks related to not receiving this procedure. A witnessed verbal and signed consent was obtained and documented in the patient's chart.  IV was checked and maintained by the nurse. Moderate conscious sedation was performed under continuous pulse oximetry and cardiac monitoring under my direct supervision.  The radiology nurse was in attendance throughout the exam. Moderate conscious sedation of 1 mg Versed and 50 mcg of Fentanyl was given.  Patient was assessed and monitoring of oxygen saturation, heart rate, and blood pressure by the nursing staff and myself during the exam for a total intraservice time of 28 minutes of conscious sedation time from 12:26 p.m. to 12:54 p.m..  The patient was placed supine on the angiographic table and the left arm was prepped and covered with a full body drape in the usual sterile fashion.  All operators present for the case performed standard pre-procedural prep including hand washing, sterile gloves, gown, mask, and cap.  All aspects of the maximum sterile barrier technique were followed.  A preprocedural time out was performed  with all physicians, technologists, and nurses involved with the procedure.  The shunt was accessed with a micropuncture system pointing toward the venous limb.  Shuntogram and central venograms were obtained.  SHUNTOGRAM:  The shunt was patent.  The axillary, subclavian, innominate vein and SVC were patent. There was a stenosis within the venous outflow at the level of the elbow.  Due to tortuosity proximal to the area of narrowing, a balloon catheter cannot be tracked over the wire to this location.   Catheters were removed and hemostasis was achieved with manual compression.  The patient tolerated the procedure well without any immediate procedural complication.  CONTRAST:  50 mL of Isovue-300  ESTIMATED BLOOD LOSS: Less than 5cc.  FLUOROSCOPY TIME/DOSE: 5.3  AIR KERMA: 17.91 mGy            CONCLUSION: 1. There is a narrowing within the venous outflow at the level the elbow.  Due to tortuosity proximal to this narrowing, a balloon catheter cannot be advanced into the location in order to angioplasty.  There is good flow through the fistula noted as there are numerous collaterals proximal to this narrowing. 2. After discussion with Dr. Coombs, an attempt for dialysis will be made through the fistula and if unsuccessful a tunneled dialysis catheter will be placed.   PLAN:  Patient will follow-up with nephrology and at dialysis.  LOCATION:  [facility]       Dictated by (CST): Quinn Bernal MD on 4/21/2025 at 12:59 PM     Finalized by (CST): Quinn Bernal MD on 4/21/2025 at 1:15 PM       CT CHEST+ABDOMEN+PELVIS(CPT=71250/91952)  Result Date: 4/20/2025  PROCEDURE:  CT CHEST+ABDOMEN+PELVIS(CPT=71250/29790)  COMPARISON:  [facility], CT, CTA CHEST + CT ABD (W) + CT PEL (W) SH(CPT=71275/51703), 3/29/2025, 4:34 PM.  INDICATIONS:  abnl cxr, SOB, llq pain  TECHNIQUE:  Following oral contrast administration, unenhanced multislice CT scanning is performed through the chest, abdomen, and pelvis.  Dose reduction techniques  were used. Dose information is transmitted to the ACR (American College of Radiology) NRDR (National Radiology Data Registry) which includes the Dose Index Registry.  PATIENT STATED HISTORY: (As transcribed by Technologist)  Patient reports shortness of breath, lower left abdominal pain and generalized weakness.    FINDINGS:   CHEST:  Left atrial enlargement.  No pericardial effusion.  Mitral valve prosthesis noted.  Mild coronary calcification.  Normal caliber of the thoracic aorta and pulmonary trunk.  Enlarged lymph nodes of the paratracheal station are stable, index right lower paratracheal node measuring 1.6 cm (series 2, image 45).  Normal thyroid and esophagus.  No central airway stenosis.  Stable right lung volume loss with areas of partial atelectasis or scar of the right middle and right lower lobes.  Upper lobe predominant emphysema.  Mild mosaic attenuation suggesting mild air trapping.  No acute airspace disease.  No new or enlarging pulmonary nodules.  Small right pleural effusion, similar to prior.  No pneumothorax.  Regional soft tissues are within normal limits.  No fracture or aggressive osseous lesion.  Suture anchors of the left humeral head noted.  ABDOMEN/PELVIS: Normal liver morphology.  Parenchymal hyperdensity of the liver may reflect sequela of amiodarone therapy.  Normal gallbladder, bile ducts, spleen, pancreas, and adrenal glands.  Bilateral renal cortical atrophy.  Subtle hyperdensity of the right distal ureter near the UVJ (series 2, image 234, 235) may represent small ureteral calculi, though there is no evidence of collecting system obstruction.  Normal stomach.  No evidence of bowel inflammation or obstruction.  Normal appendix.  Pancolonic diverticulosis without evidence of acute diverticulitis.  Concentric bladder wall thickening, likely accentuated by under distention.  Normal prostate and seminal vesicles.  No ascites, pneumoperitoneum, or regional lymphadenopathy.  No abdominal  aortic aneurysm.  Regional soft tissues are within normal limits.  Osteoarthritis of the hips and spine.  No aggressive osseous lesions.            CONCLUSION:   1. No acute process identified within the chest, abdomen, or pelvis.  2. Scarring and volume loss of the right lung, similar compared to 03/29/2025.  Stable small right pleural effusion.  Stable mediastinal lymphadenopathy.  3. Punctate hyperdensity of the right distal ureter near the UVJ may reflect urolithiasis, though without evidence of collecting system obstruction.  4. Pancolonic diverticulosis without evidence of acute diverticulitis.      LOCATION:  [facility]    Dictated by (CST): Claude Singh MD on 4/20/2025 at 0:37 AM     Finalized by (CST): Claude Singh MD on 4/20/2025 at 0:46 AM       XR CHEST AP PORTABLE  (CPT=71045)  Result Date: 4/19/2025  PROCEDURE:  XR CHEST AP PORTABLE  (CPT=71045)  TECHNIQUE:  AP chest radiograph was obtained.  COMPARISON:  [facility], XR, XR CHEST PA + LAT CHEST (TQF=27517), 4/04/2025, 5:25 PM.  INDICATIONS:  shortness of breath, abdominal pain, problem with fistula  PATIENT STATED HISTORY: (As transcribed by Technologist)  Patient has shortness of breath, dry cough, and chest tightness for around 2 weeks. Patient stated tonight he started having left sided abdomen pain.    FINDINGS:             CONCLUSION:  Stable cardiac and mediastinal contours with valvular prosthesis noted.  There continues to be confluent opacification of the right lower lung which likely represents some combination of atelectasis, airspace disease, and pleural effusion.  No appreciable pneumothorax.   LOCATION:  [facility]      Dictated by (CST): Claude Singh MD on 4/19/2025 at 11:02 PM     Finalized by (CST): Claude Singh MD on 4/19/2025 at 11:03 PM       US VENOUS DOPPLER ARM LEFT - DIAG IMG (CPT=93971)  Result Date: 4/4/2025  PROCEDURE:  US VENOUS DOPPLER ARM LEFT - DIAG IMG (CPT=93971)  COMPARISON:  [facility], US, US DUPLEX SCAN HEMODIALYSIS ACCESS   (CPT=93990), 10/25/2024, 0:56 AM.  US [facility], US DUPLEX SCAN HEMODIALYSIS ACCESS  (CPT=93990), 9/18/2024, 2:38 PM.  INDICATIONS:  Left upper extremity pain and swelling.  TECHNIQUE:  Real time, grey scale, and duplex ultrasound was used to evaluate the upper extremity venous system. B-mode two-dimensional images of the vascular structures, Doppler spectral analysis, and color flow.  Doppler imaging were performed.  The following veins were imaged:  Subclavian, Jugular, Axillary, Brachial, Basilic, Cephalic, and the contralateral Subclavian and Jugular.  PATIENT STATED HISTORY: (As transcribed by Technologist)  Patient stated left forearm swelling, tenderness, history of left forearm fistula, unable to complete dialysis since Monday, fistula bleeding during dialysis on Monday.    FINDINGS:  EXTREMITY:  Left upper extremity THROMBI:  None visible. COMPRESSION:  Normal compressibility, phasicity, and augmentation of the subclavian, jugular, axillary, brachial, basilic, and cephalic veins. OTHER:  Left upper extremity fistula is patent.            CONCLUSION:  No evidence of deep vein thrombus in the left lower extremity.  Patent left upper extremity arterial venous fistula.   LOCATION:  [facility]   Dictated by (CST): Duy De La Rosa MD on 4/04/2025 at 7:00 PM     Finalized by (CST): Duy De La Rosa MD on 4/04/2025 at 7:01 PM       XR CHEST PA + LAT CHEST (TRY=46524)  Result Date: 4/4/2025  PROCEDURE:  XR CHEST PA + LAT CHEST (CPT=71046)  INDICATIONS:  lump to left fistula sent by [facility] for eval  COMPARISON:  [facility], XR, XR CHEST AP PORTABLE  (CPT=71045), 3/29/2025, 2:34 PM.  [facility], XR, XR CHEST AP/PA (1 VIEW) (CPT=71045), 3/26/2025, 2:24 AM.  TECHNIQUE:  PA and lateral chest radiographs were obtained.  PATIENT STATED HISTORY: (As transcribed by Technologist)  For past week; left arm swelling, short of breath and difficulty breathing.    FINDINGS:  Cardiac size is stable.  There is opacification the  right mid to lower lung zone likely representing combination of effusion and consolidation.  The right effusion may contain loculations.  Mild interstitial opacities.  No pneumothorax.            CONCLUSION:  1. Interval worsening of opacification right mid to lower lung zone likely representing combination of effusion and consolidation.  Effusion may contain loculations. 2. Mild interstitial opacities suggestive of mild edema.    LOCATION:  [facility]   Dictated by (CST): Quinn Bernal MD on 4/04/2025 at 5:36 PM     Finalized by (CST): Quinn Bernal MD on 4/04/2025 at 5:37 PM         Operative reports:          Day of discharge exam:  Vitals:    04/30/25 0759   BP: 118/76   Pulse: 75   Resp: 18   Temp: 98.3 °F (36.8 °C)     No acute distress,    Lungs clear  Heart regular  Abdomen benign     Patient and/or family had opportunity to ask questions and expressed understanding and agreement with therapeutic plan as outlined      31 minutes spent on discharge    Reigna Salmon MD

## 2025-04-30 NOTE — PLAN OF CARE
Patient is alert, c/o mild pain at HD catheter site, declined PRN. HD at bedside. Patient tolerated. AVS reviewed with patient. Patient verbalized all belongings returned. Patient taken downstairs via wheelchair by RN.

## 2025-05-05 ENCOUNTER — HOSPITAL ENCOUNTER (INPATIENT)
Facility: HOSPITAL | Age: 45
LOS: 4 days | Discharge: HOME | End: 2025-05-09
Attending: EMERGENCY MEDICINE | Admitting: INTERNAL MEDICINE
Payer: MEDICARE

## 2025-05-05 DIAGNOSIS — R10.9 ABDOMINAL PAIN: ICD-10-CM

## 2025-05-05 DIAGNOSIS — R11.2 NAUSEA VOMITING AND DIARRHEA: ICD-10-CM

## 2025-05-05 DIAGNOSIS — R19.7 NAUSEA VOMITING AND DIARRHEA: ICD-10-CM

## 2025-05-05 DIAGNOSIS — R10.9 INTRACTABLE ABDOMINAL PAIN: Primary | ICD-10-CM

## 2025-05-05 DIAGNOSIS — N18.6 ESRD (END STAGE RENAL DISEASE) (HCC): ICD-10-CM

## 2025-05-05 LAB
ALBUMIN SERPL-MCNC: 4.1 G/DL (ref 3.2–4.8)
ALBUMIN/GLOB SERPL: 1.2 {RATIO} (ref 1–2)
ALP LIVER SERPL-CCNC: 131 U/L (ref 45–117)
ALT SERPL-CCNC: 28 U/L (ref 10–49)
ANION GAP SERPL CALC-SCNC: 15 MMOL/L (ref 0–18)
AST SERPL-CCNC: 32 U/L (ref ?–34)
ATRIAL RATE: 95 BPM
BASOPHILS # BLD AUTO: 0.09 X10(3) UL (ref 0–0.2)
BASOPHILS NFR BLD AUTO: 1.2 %
BILIRUB SERPL-MCNC: 0.3 MG/DL (ref 0.3–1.2)
BUN BLD-MCNC: 66 MG/DL (ref 9–23)
CALCIUM BLD-MCNC: 8.2 MG/DL (ref 8.7–10.6)
CHLORIDE SERPL-SCNC: 100 MMOL/L (ref 98–112)
CO2 SERPL-SCNC: 22 MMOL/L (ref 21–32)
CREAT BLD-MCNC: 12.21 MG/DL (ref 0.7–1.3)
EGFRCR SERPLBLD CKD-EPI 2021: 5 ML/MIN/1.73M2 (ref 60–?)
EOSINOPHIL # BLD AUTO: 0.26 X10(3) UL (ref 0–0.7)
EOSINOPHIL NFR BLD AUTO: 3.5 %
ERYTHROCYTE [DISTWIDTH] IN BLOOD BY AUTOMATED COUNT: 12.6 %
GLOBULIN PLAS-MCNC: 3.5 G/DL (ref 2–3.5)
GLUCOSE BLD-MCNC: 148 MG/DL (ref 70–99)
GLUCOSE BLD-MCNC: 203 MG/DL (ref 70–99)
GLUCOSE BLD-MCNC: 98 MG/DL (ref 70–99)
HCT VFR BLD AUTO: 26 % (ref 39–53)
HGB BLD-MCNC: 9 G/DL (ref 13–17.5)
IMM GRANULOCYTES # BLD AUTO: 0.02 X10(3) UL (ref 0–1)
IMM GRANULOCYTES NFR BLD: 0.3 %
LIPASE SERPL-CCNC: 123 U/L (ref 12–53)
LYMPHOCYTES # BLD AUTO: 1.16 X10(3) UL (ref 1–4)
LYMPHOCYTES NFR BLD AUTO: 15.7 %
MCH RBC QN AUTO: 32.3 PG (ref 26–34)
MCHC RBC AUTO-ENTMCNC: 34.6 G/DL (ref 31–37)
MCV RBC AUTO: 93.2 FL (ref 80–100)
MONOCYTES # BLD AUTO: 0.74 X10(3) UL (ref 0.1–1)
MONOCYTES NFR BLD AUTO: 10 %
NEUTROPHILS # BLD AUTO: 5.14 X10 (3) UL (ref 1.5–7.7)
NEUTROPHILS # BLD AUTO: 5.14 X10(3) UL (ref 1.5–7.7)
NEUTROPHILS NFR BLD AUTO: 69.3 %
OSMOLALITY SERPL CALC.SUM OF ELEC: 309 MOSM/KG (ref 275–295)
P AXIS: 46 DEGREES
P-R INTERVAL: 196 MS
PLATELET # BLD AUTO: 236 10(3)UL (ref 150–450)
POTASSIUM SERPL-SCNC: 4.3 MMOL/L (ref 3.5–5.1)
PROT SERPL-MCNC: 7.6 G/DL (ref 5.7–8.2)
Q-T INTERVAL: 398 MS
QRS DURATION: 92 MS
QTC CALCULATION (BEZET): 500 MS
R AXIS: 23 DEGREES
RBC # BLD AUTO: 2.79 X10(6)UL (ref 4.3–5.7)
SODIUM SERPL-SCNC: 137 MMOL/L (ref 136–145)
T AXIS: 70 DEGREES
VENTRICULAR RATE: 95 BPM
WBC # BLD AUTO: 7.4 X10(3) UL (ref 4–11)

## 2025-05-05 PROCEDURE — 96375 TX/PRO/DX INJ NEW DRUG ADDON: CPT

## 2025-05-05 PROCEDURE — 94640 AIRWAY INHALATION TREATMENT: CPT

## 2025-05-05 PROCEDURE — 93010 ELECTROCARDIOGRAM REPORT: CPT

## 2025-05-05 PROCEDURE — 90935 HEMODIALYSIS ONE EVALUATION: CPT | Performed by: INTERNAL MEDICINE

## 2025-05-05 PROCEDURE — 93005 ELECTROCARDIOGRAM TRACING: CPT

## 2025-05-05 PROCEDURE — 96376 TX/PRO/DX INJ SAME DRUG ADON: CPT

## 2025-05-05 PROCEDURE — 5A1D70Z PERFORMANCE OF URINARY FILTRATION, INTERMITTENT, LESS THAN 6 HOURS PER DAY: ICD-10-PCS | Performed by: INTERNAL MEDICINE

## 2025-05-05 PROCEDURE — 85025 COMPLETE CBC W/AUTO DIFF WBC: CPT | Performed by: EMERGENCY MEDICINE

## 2025-05-05 PROCEDURE — 96361 HYDRATE IV INFUSION ADD-ON: CPT

## 2025-05-05 PROCEDURE — 87040 BLOOD CULTURE FOR BACTERIA: CPT | Performed by: HOSPITALIST

## 2025-05-05 PROCEDURE — 83690 ASSAY OF LIPASE: CPT | Performed by: EMERGENCY MEDICINE

## 2025-05-05 PROCEDURE — 80053 COMPREHEN METABOLIC PANEL: CPT | Performed by: EMERGENCY MEDICINE

## 2025-05-05 PROCEDURE — 96374 THER/PROPH/DIAG INJ IV PUSH: CPT

## 2025-05-05 PROCEDURE — 99285 EMERGENCY DEPT VISIT HI MDM: CPT

## 2025-05-05 PROCEDURE — 82962 GLUCOSE BLOOD TEST: CPT

## 2025-05-05 RX ORDER — BUPROPION HYDROCHLORIDE 150 MG/1
150 TABLET ORAL DAILY
Status: DISCONTINUED | OUTPATIENT
Start: 2025-05-06 | End: 2025-05-09

## 2025-05-05 RX ORDER — HYDRALAZINE HYDROCHLORIDE 25 MG/1
25 TABLET, FILM COATED ORAL 3 TIMES DAILY
Status: DISCONTINUED | OUTPATIENT
Start: 2025-05-05 | End: 2025-05-09

## 2025-05-05 RX ORDER — CALCIUM ACETATE 667 MG/1
667 CAPSULE ORAL
Status: DISCONTINUED | OUTPATIENT
Start: 2025-05-05 | End: 2025-05-09

## 2025-05-05 RX ORDER — GABAPENTIN 100 MG/1
200 CAPSULE ORAL 3 TIMES DAILY
Status: DISCONTINUED | OUTPATIENT
Start: 2025-05-05 | End: 2025-05-09

## 2025-05-05 RX ORDER — TAMSULOSIN HYDROCHLORIDE 0.4 MG/1
0.4 CAPSULE ORAL DAILY
Status: DISCONTINUED | OUTPATIENT
Start: 2025-05-05 | End: 2025-05-09

## 2025-05-05 RX ORDER — HYDROMORPHONE HYDROCHLORIDE 1 MG/ML
0.4 INJECTION, SOLUTION INTRAMUSCULAR; INTRAVENOUS; SUBCUTANEOUS EVERY 2 HOUR PRN
Refills: 0 | Status: DISCONTINUED | OUTPATIENT
Start: 2025-05-05 | End: 2025-05-09

## 2025-05-05 RX ORDER — ALBUTEROL SULFATE 90 UG/1
2 INHALANT RESPIRATORY (INHALATION) EVERY 6 HOURS PRN
Status: DISCONTINUED | OUTPATIENT
Start: 2025-05-05 | End: 2025-05-09

## 2025-05-05 RX ORDER — PANTOPRAZOLE SODIUM 40 MG/1
40 TABLET, DELAYED RELEASE ORAL
Status: DISCONTINUED | OUTPATIENT
Start: 2025-05-06 | End: 2025-05-09

## 2025-05-05 RX ORDER — ACETAMINOPHEN 500 MG
500 TABLET ORAL EVERY 6 HOURS PRN
Status: DISCONTINUED | OUTPATIENT
Start: 2025-05-05 | End: 2025-05-09

## 2025-05-05 RX ORDER — CARVEDILOL 12.5 MG/1
25 TABLET ORAL 2 TIMES DAILY WITH MEALS
Status: DISCONTINUED | OUTPATIENT
Start: 2025-05-05 | End: 2025-05-09

## 2025-05-05 RX ORDER — HYDROMORPHONE HYDROCHLORIDE 1 MG/ML
1 INJECTION, SOLUTION INTRAMUSCULAR; INTRAVENOUS; SUBCUTANEOUS EVERY 30 MIN PRN
Refills: 0 | Status: COMPLETED | OUTPATIENT
Start: 2025-05-05 | End: 2025-05-05

## 2025-05-05 RX ORDER — HYDROMORPHONE HYDROCHLORIDE 1 MG/ML
0.8 INJECTION, SOLUTION INTRAMUSCULAR; INTRAVENOUS; SUBCUTANEOUS EVERY 2 HOUR PRN
Refills: 0 | Status: DISCONTINUED | OUTPATIENT
Start: 2025-05-05 | End: 2025-05-09

## 2025-05-05 RX ORDER — HEPARIN SODIUM 5000 [USP'U]/ML
5000 INJECTION, SOLUTION INTRAVENOUS; SUBCUTANEOUS EVERY 8 HOURS SCHEDULED
Status: DISCONTINUED | OUTPATIENT
Start: 2025-05-05 | End: 2025-05-09

## 2025-05-05 RX ORDER — HYDROMORPHONE HYDROCHLORIDE 1 MG/ML
0.2 INJECTION, SOLUTION INTRAMUSCULAR; INTRAVENOUS; SUBCUTANEOUS EVERY 2 HOUR PRN
Refills: 0 | Status: DISCONTINUED | OUTPATIENT
Start: 2025-05-05 | End: 2025-05-09

## 2025-05-05 RX ORDER — DEXTROSE MONOHYDRATE 25 G/50ML
50 INJECTION, SOLUTION INTRAVENOUS
Status: DISCONTINUED | OUTPATIENT
Start: 2025-05-05 | End: 2025-05-09

## 2025-05-05 RX ORDER — ONDANSETRON 2 MG/ML
4 INJECTION INTRAMUSCULAR; INTRAVENOUS EVERY 4 HOURS PRN
Status: ACTIVE | OUTPATIENT
Start: 2025-05-05 | End: 2025-05-05

## 2025-05-05 RX ORDER — CLONIDINE HYDROCHLORIDE 0.1 MG/1
0.1 TABLET ORAL 2 TIMES DAILY
Status: DISCONTINUED | OUTPATIENT
Start: 2025-05-05 | End: 2025-05-09

## 2025-05-05 RX ORDER — NIFEDIPINE 30 MG/1
30 TABLET, EXTENDED RELEASE ORAL DAILY
Status: DISCONTINUED | OUTPATIENT
Start: 2025-05-06 | End: 2025-05-09

## 2025-05-05 RX ORDER — LOSARTAN POTASSIUM 100 MG/1
100 TABLET ORAL DAILY
Status: DISCONTINUED | OUTPATIENT
Start: 2025-05-06 | End: 2025-05-09

## 2025-05-05 RX ORDER — ATORVASTATIN CALCIUM 20 MG/1
20 TABLET, FILM COATED ORAL EVERY EVENING
Status: DISCONTINUED | OUTPATIENT
Start: 2025-05-05 | End: 2025-05-09

## 2025-05-05 RX ORDER — ALBUMIN (HUMAN) 12.5 G/50ML
25 SOLUTION INTRAVENOUS
Status: ACTIVE | OUTPATIENT
Start: 2025-05-05 | End: 2025-05-07

## 2025-05-05 RX ORDER — ARIPIPRAZOLE 5 MG/1
15 TABLET ORAL DAILY
Status: DISCONTINUED | OUTPATIENT
Start: 2025-05-06 | End: 2025-05-09

## 2025-05-05 RX ORDER — HYDROCODONE BITARTRATE AND ACETAMINOPHEN 5; 325 MG/1; MG/1
1 TABLET ORAL EVERY 6 HOURS PRN
Refills: 0 | Status: DISCONTINUED | OUTPATIENT
Start: 2025-05-05 | End: 2025-05-09

## 2025-05-05 RX ORDER — LAMOTRIGINE 100 MG/1
100 TABLET ORAL DAILY
Status: DISCONTINUED | OUTPATIENT
Start: 2025-05-06 | End: 2025-05-09

## 2025-05-05 RX ORDER — NICOTINE POLACRILEX 4 MG
30 LOZENGE BUCCAL
Status: DISCONTINUED | OUTPATIENT
Start: 2025-05-05 | End: 2025-05-09

## 2025-05-05 RX ORDER — SEVELAMER CARBONATE 800 MG/1
2400 TABLET, FILM COATED ORAL
Status: DISCONTINUED | OUTPATIENT
Start: 2025-05-05 | End: 2025-05-09

## 2025-05-05 RX ORDER — SODIUM CHLORIDE 9 MG/ML
INJECTION, SOLUTION INTRAVENOUS CONTINUOUS
Status: ACTIVE | OUTPATIENT
Start: 2025-05-05 | End: 2025-05-05

## 2025-05-05 RX ORDER — ONDANSETRON 2 MG/ML
4 INJECTION INTRAMUSCULAR; INTRAVENOUS ONCE
Status: COMPLETED | OUTPATIENT
Start: 2025-05-05 | End: 2025-05-05

## 2025-05-05 RX ORDER — HYDROMORPHONE HYDROCHLORIDE 1 MG/ML
0.5 INJECTION, SOLUTION INTRAMUSCULAR; INTRAVENOUS; SUBCUTANEOUS EVERY 30 MIN PRN
Status: ACTIVE | OUTPATIENT
Start: 2025-05-05 | End: 2025-05-05

## 2025-05-05 RX ORDER — NICOTINE POLACRILEX 4 MG
15 LOZENGE BUCCAL
Status: DISCONTINUED | OUTPATIENT
Start: 2025-05-05 | End: 2025-05-09

## 2025-05-05 NOTE — ED PROVIDER NOTES
Patient Seen in: [facility] In [place]      History     Chief Complaint   Patient presents with    Abdomen/Flank Pain     Stated Complaint: c/o of left sided abdominal pain started yesterday, cant keep food down    Subjective:   HPI    45-year-old male presents reporting left-sided abdominal pain with intractable nausea, vomiting, diarrhea.  Symptoms began 1 day ago.  He is on hemodialysis.  He reports a history of a previous hernia repair.  Most recent dialysis was on Friday.  Due for dialysis this morning.  Last episode of vomiting and diarrhea was just before coming to the ER.  Reports chills and sweats.  No known sick contacts.  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[facility])    Congestive heart disease ([facility])    COPD (chronic obstructive pulmonary disease) ([facility])    Coronary atherosclerosis    Deep vein thrombosis ([facility])    at age 17 R/T cast    Depression    Diabetes ([facility])    Dialysis patient    Diverticulosis of large intestine    Essential hypertension    3/21 echo: severe concentric LVH with normal EF and no MR or pHTN    Extrinsic asthma, unspecified    Heart attack ([facility])    2016- angiogram- no intervention    Heart valve disease    mitral valve repair in 1994/    High blood pressure    High cholesterol    History of blood transfusion    History of mitral valve repair    Hyperlipidemia    Low back pain    tight and stiff after sweeping and mopping    LVH (left ventricular hypertrophy)    Migraines    Mixed hyperlipidemia     HDL 38 LDL 97 VLDL 57     Monoclonal gammopathy    IgG kappa     Muscle weakness    MVP (mitral valve prolapse)    Repair 1994 at [place]; echoes as recently as 3/21 show  mild or trivial MR and no stenosis    Neuropathy    Osteoarthritis    hip ,knees    Pneumonia due to organism    Pulmonary embolism (HCC)    Renal disorder    Stroke (HCC)    TIA (transient ischemic attack)    Initial history of left-sided weakness and slurred speech. (+) cocaine. MRI of the brain, CT angiogram of the head and neck, and 2D echo are all unremarkable.     TMJ (dislocation of temporomandibular joint)    Troponin level elevated    Trop 60 60 47 with TIA and no CP: Lexiscan negative with EF 51    Visual impairment              Past Surgical History:   Procedure Laterality Date    Av fistula revision, open Left     Cabg      Colonoscopy N/A 03/26/2023    Procedure: COLONOSCOPY;  Surgeon: Heath Vu MD;  Location:  ENDOSCOPY    Colonoscopy N/A 12/30/2023    Procedure: COLONOSCOPY with cold snare polypectomy and forcep polypectomy;  Surgeon: Ousmane Suarze MD;  Location:  ENDOSCOPY    Colonoscopy N/A 3/9/2025    Procedure: COLONOSCOPY WITH COLD SNARE POLYPECTOMY AND ENDO CLIPS X 2;  Surgeon: Bakari Noguera MD;  Location:  ENDOSCOPY    Colonoscopy & polypectomy  2019    Egd  2019    Duodenitis. Biopsied. EUS for weight loss was negative    Heart surgery      Hernia surgery  08/17/2022    Dr Barnes    Laminectomy,>2 sgmt,lumbar  09/06/2018    L4-L5 Decomp Discectomy ROEM L4-L5    Mitralplasty w cp bypass  1994    [facility]: Repair    Repair rotator cuff,chronic Left     torn and had a ruptured bicep    Sinus surgery        Spine surgery procedure unlisted      Valve repair  1994    mitral valve                Social History     Socioeconomic History    Marital status:    Tobacco Use    Smoking status: Former     Current packs/day: 0.00     Average packs/day: 1 pack/day for 27.0 years (27.0 ttl pk-yrs)     Types: Cigarettes     Start date: 2/28/1995     Quit date: 2/28/2022     Years since quitting: 3.1     Passive exposure: Never    Smokeless tobacco: Never   Vaping Use    Vaping status:  Never Used   Substance and Sexual Activity    Alcohol use: No    Drug use: No     Social Drivers of Health     Food Insecurity: No Food Insecurity (4/27/2025)    NCSS - Food Insecurity     Worried About Running Out of Food in the Last Year: No     Ran Out of Food in the Last Year: No   Transportation Needs: No Transportation Needs (4/27/2025)    NCSS - Transportation     Lack of Transportation: No   Housing Stability: Not At Risk (4/27/2025)    NCSS - Housing/Utilities     Has Housing: Yes     Worried About Losing Housing: No     Unable to Get Utilities: No                                Physical Exam     ED Triage Vitals [05/05/25 0607]   BP (!) 190/114   Pulse 97   Resp 20   Temp 97.7 °F (36.5 °C)   Temp src Oral   SpO2 98 %   O2 Device None (Room air)       Current Vitals:   Vital Signs  BP: (!) 190/114  Pulse: 97  Resp: 20  Temp: 97.7 °F (36.5 °C)  Temp src: Oral    Oxygen Therapy  SpO2: 98 %  O2 Device: None (Room air)        Physical Exam  General:  Vitals as listed.  Appears uncomfortable  HEENT: Sclerae anicteric.  Dry mucous membranes  Neck: supple, no rigidity   Lungs: good air exchange and clear   Heart: regular rate rhythm.  Loud systolic murmur at the left sternal border  Abdomen: Generalized tenderness on palpation.  Normal bowel sounds.  No abdominal masses.  No peritoneal signs   Extremities: no edema, normal peripheral pulses   Neuro: Alert oriented and nonfocal     Physical Exam                ED Course     Labs Reviewed   COMP METABOLIC PANEL (14) - Abnormal; Notable for the following components:       Result Value    Glucose 203 (*)     BUN 66 (*)     Creatinine 12.21 (*)     Calcium, Total 8.2 (*)     Calculated Osmolality 309 (*)     eGFR-Cr 5 (*)     Alkaline Phosphatase 131 (*)     All other components within normal limits   CBC WITH DIFFERENTIAL WITH PLATELET - Abnormal; Notable for the following components:    RBC 2.79 (*)     HGB 9.0 (*)     HCT 26.0 (*)     All other components within  normal limits   LIPASE - Abnormal; Notable for the following components:    Lipase 123 (*)     All other components within normal limits   URINALYSIS WITH CULTURE REFLEX   RAINBOW DRAW LAVENDER   RAINBOW DRAW LIGHT GREEN   RAINBOW DRAW BLUE     EKG    Rate, intervals and axes as noted on EKG Report.  Rate: 95  Rhythm: Sinus Rhythm  Reading: No ST elevation MI              Results                           MDM      45-year-old male on hemodialysis presents with generalized abdominal pain with nausea, vomiting, diarrhea.  Generalized tenderness on palpation.  No rebound or guarding.  Normal bowel sounds.    Additional history obtained by evaluation of extensive medical documentation showing history of recurrent abdominal pain.  Numerous previous CT scan showing nonspecific colitis/enteritis.    Differential includes but is not limited to dehydration, metabolic disturbance, gastroenteritis, a life/function threat.    CBC, CMP, urinalysis, lipase ordered for further evaluation.  1 L NS bolus.  Dilaudid 1 mg IV, Zofran 4 mg IV.    Laboratory evaluation shows chronic kidney disease.  Potassium within normal limits.  Lipase is mildly elevated.  He does not have significant epigastric pain to suggest acute pancreatitis.  At this time I am trying to avoid any imaging as he has had 7 CT scans in the last 2 months.  He has received 3 rounds of Dilaudid 1 mg IV at this point and is still reporting uncontrolled pain.  He has missed his dialysis today.  Will hospitalize for further management, possible inpatient dialysis, pain control.      Admission disposition: 5/5/2025  8:47 AM           Medical Decision Making      Disposition and Plan     Clinical Impression:  1. Intractable abdominal pain    2. ESRD (end stage renal disease) (HCC)    3. Nausea vomiting and diarrhea         Disposition:  Admit  5/5/2025  8:47 am    Follow-up:  No follow-up provider specified.        Medications Prescribed:  Current Discharge Medication List           Supplementary Documentation:         Hospital Problems       Present on Admission  Date Reviewed: 3/30/2025          ICD-10-CM Noted POA    * (Principal) Intractable abdominal pain R10.9 5/5/2025 Unknown                                                                Yes - the patient is able to be screened

## 2025-05-05 NOTE — PROGRESS NOTES
NURSING ADMISSION NOTE      Patient admitted via Ambulance  Oriented to room.  Safety precautions initiated.  Bed in low position.  Call light in reach.  VSS Afebrile.  Note BP elevated.

## 2025-05-05 NOTE — H&P
[facility] H&P/Consult note       CC:   Chief Complaint   Patient presents with    Abdomen/Flank Pain        PCP: Adrian Small MD    History of Present Illness: Patient is a 45 year old male with PMH sig for HTN, CHF, ESRD on HD, DM, bipolar do, pavan, here for abd pain  Reports that since yesterday has had several episodes of emesis, non bloody, cannot keep things down. Also with watery diarrhea. Has also had chills / sweats. No fevers.   No cp, sob.   No sick contacts outside of hospice but has had mutlpel hospitalizations       PMH  Past Medical History[1]     PSH  Past Surgical History[2]     ALL:  Allergies[3]     Home Medications:  Medications Taking[4]      Soc Hx  Social History     Tobacco Use    Smoking status: Former     Current packs/day: 0.00     Average packs/day: 1 pack/day for 27.0 years (27.0 ttl pk-yrs)     Types: Cigarettes     Start date: 2/28/1995     Quit date: 2/28/2022     Years since quitting: 3.1     Passive exposure: Never    Smokeless tobacco: Never   Substance Use Topics    Alcohol use: No        Fam Hx  Family History[5]    Review of Systems  Comprehensive ROS reviewed and negative except for what's stated above.         OBJECTIVE:  BP (!) 192/126 (BP Location: Right arm)   Pulse 93   Temp 97.7 °F (36.5 °C) (Oral)   Resp 22   Wt 237 lb 7 oz (107.7 kg)   SpO2 92%   BMI 30.48 kg/m²   General:  Alert, no distress, appears stated age.   Head:  Normocephalic,    Eyes:  Sclera anicteric    Throat: MMM   Neck: Supple    Lungs:   Clear to auscultation bilaterally.    Chest wall:  No tenderness or deformity.   Heart:  Regular rate and rhythm,    Abdomen:   Soft mild ttp in epigastri /RUQ    Extremities: Atraumatic, no cyanosis or edema.  Swelling in left arm   Skin: No visible rashes or lesions.    Neurologic: Normal strength, no focal deficit appreciated     Diagnostic Data:    CBC/Chem  Recent Labs   Lab 04/29/25  0606 05/05/25  0627   WBC 5.1 7.4   HGB 9.1* 9.0*   MCV 90.8 93.2    .0 236.0       Recent Labs   Lab 04/29/25  0606 04/30/25  0559 05/05/25 0627    139 137   K 4.1 4.1 4.3    103 100   CO2 26.0 24.0 22.0   BUN 45* 57* 66*   CREATSERUM 8.41* 10.62* 12.21*   * 136* 203*   CA 9.4 9.6 8.2*   MG 2.2 2.3  --    PHOS 5.9* 6.4*  --        Recent Labs   Lab 04/29/25  0606 04/30/25  0559 05/05/25 0627   ALT  --   --  28   AST  --   --  32   ALB 4.4 4.3 4.1       No results for input(s): \"TROP\" in the last 168 hours.    Additional Diagnostics: ECG: sinus       Radiology: US VENOUS DOPPLER ARM LEFT - DIAG IMG (CPT=93971)  Result Date: 4/29/2025  PROCEDURE:  US VENOUS DOPPLER ARM LEFT - DIAG IMG (CPT=93971)  COMPARISON:  [facility] include [facility] beds at the [facility], US, US VENOUS DOPPLER ARM LEFT - DIAG IMG (CPT=93971), 4/04/2025, 5:49 PM.  INDICATIONS:  Left upper extremity pain and swelling, neck swelling  TECHNIQUE:  Real time, grey scale, and duplex ultrasound was used to evaluate the upper extremity venous system. B-mode two-dimensional images of the vascular structures, Doppler spectral analysis, and color flow.  Doppler imaging were performed.  The following veins were imaged:  Subclavian, Jugular, Axillary, Brachial, Basilic, Cephalic, and the contralateral Subclavian and Jugular.  PATIENT STATED HISTORY: (As transcribed by Technologist)     FINDINGS:  EXTREMITY:  Left upper extremity THROMBI:  None visible. COMPRESSION:  Normal compressibility, phasicity, and augmentation of the subclavian, jugular, axillary, brachial, basilic, and cephalic veins. OTHER:  Negative.            CONCLUSION:  No evidence of deep venous thrombosis of the left upper extremity   LOCATION:  [facility]   Dictated by (CST): Claude Singh MD on 4/29/2025 at 5:31 PM     Finalized by (CST): Claude Singh MD on 4/29/2025 at 5:32 PM       US HEAD/NECK (CPT=76536)  Result Date: 4/28/2025  PROCEDURE:  US HEAD/NECK (CPT=76536)  COMPARISON:  [facility] IR PERMANENT CATHETER INSERTION EXCHNGE CHECK, 4/22/2025, 8:57 AM.  INDICATIONS:   Palpable lump superior to PermCath insertion site.  TECHNIQUE:  Sonography was performed of the clinically requested area of interest.  PATIENT STATED HISTORY: (As transcribed by Technologist)     FINDINGS:  MASSES:  There is a hypoechoic lobulated structure within the subcutaneous tissue of the left neck along the region the palpable abnormality measuring 11 x 6 mm x 7 mm in size.  There is no flow within this lesion.  This could represent a focus of scar tissue or possibly a small hematoma. FLUID COLLECTIONS:  None. OTHER:  Negative.            CONCLUSION:  Hypoechoic nodular density correspond to palpable abnormality within the left neck subcutaneous tissues could represent a small hematoma or nodular focus of scar tissue.   LOCATION:  [facility]   Dictated by (CST): Jaycob Cassidy MD on 4/28/2025 at 5:00 PM     Finalized by (CST): Jaycob Cassidy MD on 4/28/2025 at 5:04 PM       XR CHEST AP PORTABLE  (CPT=71045)  Result Date: 4/27/2025  PROCEDURE:  XR CHEST AP PORTABLE  (CPT=71045)  TECHNIQUE:  AP chest radiograph was obtained.  COMPARISON:  [facility], XR, XR CHEST AP PORTABLE  (CPT=71045), 4/19/2025, 10:33 PM.  INDICATIONS:  New dialysis catheter with swelling and pain. Placed Thursday  PATIENT STATED HISTORY: (As transcribed by Technologist)    New dialysis catheter with swelling and pain. Placed Thursday .             CONCLUSION:  Central venous catheter tips in SVC.  Stable airspace disease and effusion in the right midlung and base.  Stable heart size and pulmonary vascularity.  No pneumothorax.   LOCATION:  [facility]      Dictated by (CST): Arlyn Oglesby MD on 4/27/2025 at 7:34 AM     Finalized by (CST): Arlyn Oglesby MD on 4/27/2025 at 7:35 AM       US ARTERIAL DUPLEX UPPER EXTREMITY LEFT (CPT=93931)  Result Date: 4/23/2025  PROCEDURE:  US ARTERIAL DUPLEX UPPER EXTREMITY LEFT (CPT=93931)  COMPARISON:  None.  INDICATIONS:  Dialysis fistula malfunction  TECHNIQUE:  Real time grey scale and duplex ultrasound was  used to evaluate the upper extremity arterial system. B-mode two dimensional images of the vascular structures, Doppler spectral analysis, and color flow Doppler imaging were performed.  PATIENT STATED HISTORY: (As transcribed by Technologist)  Unsuccessful dialysis access.    FINDINGS:  There is a radiocephalic fistula in the left upper extremity.  The radial artery is patent with flow velocity measuring up to 237 centimeters/second proximal to the anastomosis and cell approximately 77 centimeters/second distal to the anastomosis.  Arterial venous anastomosis is widely patent with flow rate measuring approximately 160 centimeters per 2nd.  The venous outflow is patent proximally, though appears occluded at the level of the elbow with intramural thrombus spanning a segment of approximately 2.6 centimeters.             CONCLUSION:  1. Left upper extremity fistula.  Approximately 2.6 cm segment of thrombus within the venous outflow tract the level of the elbow.  This corresponds to the recent fistulagram findings. 2. Arterial venous anastomosis is patent.   LOCATION:  [facility]    Dictated by (CST): Duy De La Rosa MD on 4/23/2025 at 3:25 PM     Finalized by (CST): Duy De La Rosa MD on 4/23/2025 at 3:29 PM       IR CENTRAL VENOUS ACCESS  Result Date: 4/22/2025            PROCEDURE:  IR PERMANENT CATHETER INSERTION EXCHNGE CHECK  INDICATIONS:  ESRD  TECHNIQUE:  Prior to the procedure, I discussed with the patient and/or legal representative the potential benefits, risks, and side effects of this procedure, the likelihood of the patient achieving goals; and the potential problems that might occur during recuperation.  I discussed reasonable alternatives to the procedure, including risks, benefits, steps to prevent infection and side effects related to the alternatives, and risks related to not receiving this procedure. A witnessed verbal and signed consent was obtained and documented in the patient's chart.  IV was checked  and maintained by the nurse. Moderate conscious sedation was performed under continuous pulse oximetry and cardiac monitoring under my direct supervision.  The radiology nurse was in attendance throughout the exam. Moderate conscious sedation of 2 mg Versed and 100 mcg of Fentanyl was given.  Patient was assessed and monitoring of oxygen saturation, heart rate, and blood pressure by the nursing staff and myself during the exam for a total intraservice time of 12 minutes of conscious sedation time from 915 to 927.  Two grams of Ancef were given pre-procedurally via existing IV.  The patient was placed supine on the angiographic table and the left chest and neck was prepped and covered with a full body drape in the usual sterile fashion.  All operators present for the case performed standard pre-procedural prep including hand washing, sterile gloves, gown, mask, and cap.  All aspects of the maximum sterile barrier technique were followed.  A preprocedural time out was performed with all physicians, technologists, and nurses involved with the procedure. Ultrasound of the right neck demonstrated a severely stenotic right internal jugular vein. Sonography of the left  neck demonstrated a patent internal jugular vein and a permanent image was obtained.  Under sonographic guidance, the left   internal jugular vein was accessed using a micropuncture system.  A guidewire was advanced into the IVC under fluoroscopic guidance.  Using blunt dissection a dual lumen tunneled catheter was placed via a peel-away sheath.  Both lumens flushed and returned easily.  The catheter was secured and heparinized.  A small amount of Dermabond was placed at the neck venotomy site.   Sterile gauze and transparent dressing was applied.  The patient tolerated the procedure well and there were no immediate complications. Routine post tunneled catheter insertion instructions were communicated to the patient.  ESTIMATED BLOOD LOSS: Less than 5cc.   FLUORO TIME/DOSE:  0.4 minutes  AIR KERMA:  5.84 mGy  IMPRESSION: Successful placement of left internal jugular vein tunneled dialysis catheter ( 27 cm tip to cuff Bard HemoSplit catheter)   LOCATION:  [facility]    Dictated by (CST): Duy De La Rosa MD on 4/22/2025 at 10:16 AM     Finalized by (CST): Duy De La Rosa MD on 4/22/2025 at 10:18 AM       IR GRAFT PROCEDURE  Result Date: 4/21/2025  PROCEDURE:  IR AV GRAFT DECLOT CHECK  INDICATIONS:  Left arm swelling  TECHNIQUE: Prior to the procedure, I discussed with the patient and/or legal representative the potential benefits, risks, and side effects of this procedure, the likelihood of the patient achieving goals; and the potential problems that might occur during recuperation.  I discussed reasonable alternatives to the procedure, including risks, benefits, steps to prevent infection and side effects related to the alternatives, and risks related to not receiving this procedure. A witnessed verbal and signed consent was obtained and documented in the patient's chart.  IV was checked and maintained by the nurse. Moderate conscious sedation was performed under continuous pulse oximetry and cardiac monitoring under my direct supervision.  The radiology nurse was in attendance throughout the exam. Moderate conscious sedation of 1 mg Versed and 50 mcg of Fentanyl was given.  Patient was assessed and monitoring of oxygen saturation, heart rate, and blood pressure by the nursing staff and myself during the exam for a total intraservice time of 28 minutes of conscious sedation time from 12:26 p.m. to 12:54 p.m..  The patient was placed supine on the angiographic table and the left arm was prepped and covered with a full body drape in the usual sterile fashion.  All operators present for the case performed standard pre-procedural prep including hand washing, sterile gloves, gown, mask, and cap.  All aspects of the maximum sterile barrier technique were followed.  A preprocedural  time out was performed with all physicians, technologists, and nurses involved with the procedure.  The shunt was accessed with a micropuncture system pointing toward the venous limb.  Shuntogram and central venograms were obtained.  SHUNTOGRAM:  The shunt was patent.  The axillary, subclavian, innominate vein and SVC were patent. There was a stenosis within the venous outflow at the level of the elbow.  Due to tortuosity proximal to the area of narrowing, a balloon catheter cannot be tracked over the wire to this location.   Catheters were removed and hemostasis was achieved with manual compression.  The patient tolerated the procedure well without any immediate procedural complication.  CONTRAST:  50 mL of Isovue-300  ESTIMATED BLOOD LOSS: Less than 5cc.  FLUOROSCOPY TIME/DOSE: 5.3  AIR KERMA: 17.91 mGy            CONCLUSION: 1. There is a narrowing within the venous outflow at the level the elbow.  Due to tortuosity proximal to this narrowing, a balloon catheter cannot be advanced into the location in order to angioplasty.  There is good flow through the fistula noted as there are numerous collaterals proximal to this narrowing. 2. After discussion with Dr. Coombs, an attempt for dialysis will be made through the fistula and if unsuccessful a tunneled dialysis catheter will be placed.   PLAN:  Patient will follow-up with nephrology and at dialysis.  LOCATION:  [facility]       Dictated by (CST): Quinn Bernal MD on 4/21/2025 at 12:59 PM     Finalized by (CST): Quinn Bernal MD on 4/21/2025 at 1:15 PM       CT CHEST+ABDOMEN+PELVIS(CPT=71250/88943)  Result Date: 4/20/2025  PROCEDURE:  CT CHEST+ABDOMEN+PELVIS(CPT=71250/12319)  COMPARISON:  [facility], CT, CTA CHEST + CT ABD (W) + CT PEL (W) SH(CPT=71275/14137), 3/29/2025, 4:34 PM.  INDICATIONS:  abnl cxr, SOB, llq pain  TECHNIQUE:  Following oral contrast administration, unenhanced multislice CT scanning is performed through the chest, abdomen, and pelvis.  Dose  reduction techniques were used. Dose information is transmitted to the ACR (American College of Radiology) NRDR (National Radiology Data Registry) which includes the Dose Index Registry.  PATIENT STATED HISTORY: (As transcribed by Technologist)  Patient reports shortness of breath, lower left abdominal pain and generalized weakness.    FINDINGS:   CHEST:  Left atrial enlargement.  No pericardial effusion.  Mitral valve prosthesis noted.  Mild coronary calcification.  Normal caliber of the thoracic aorta and pulmonary trunk.  Enlarged lymph nodes of the paratracheal station are stable, index right lower paratracheal node measuring 1.6 cm (series 2, image 45).  Normal thyroid and esophagus.  No central airway stenosis.  Stable right lung volume loss with areas of partial atelectasis or scar of the right middle and right lower lobes.  Upper lobe predominant emphysema.  Mild mosaic attenuation suggesting mild air trapping.  No acute airspace disease.  No new or enlarging pulmonary nodules.  Small right pleural effusion, similar to prior.  No pneumothorax.  Regional soft tissues are within normal limits.  No fracture or aggressive osseous lesion.  Suture anchors of the left humeral head noted.  ABDOMEN/PELVIS: Normal liver morphology.  Parenchymal hyperdensity of the liver may reflect sequela of amiodarone therapy.  Normal gallbladder, bile ducts, spleen, pancreas, and adrenal glands.  Bilateral renal cortical atrophy.  Subtle hyperdensity of the right distal ureter near the UVJ (series 2, image 234, 235) may represent small ureteral calculi, though there is no evidence of collecting system obstruction.  Normal stomach.  No evidence of bowel inflammation or obstruction.  Normal appendix.  Pancolonic diverticulosis without evidence of acute diverticulitis.  Concentric bladder wall thickening, likely accentuated by under distention.  Normal prostate and seminal vesicles.  No ascites, pneumoperitoneum, or regional  lymphadenopathy.  No abdominal aortic aneurysm.  Regional soft tissues are within normal limits.  Osteoarthritis of the hips and spine.  No aggressive osseous lesions.            CONCLUSION:   1. No acute process identified within the chest, abdomen, or pelvis.  2. Scarring and volume loss of the right lung, similar compared to 03/29/2025.  Stable small right pleural effusion.  Stable mediastinal lymphadenopathy.  3. Punctate hyperdensity of the right distal ureter near the UVJ may reflect urolithiasis, though without evidence of collecting system obstruction.  4. Pancolonic diverticulosis without evidence of acute diverticulitis.      LOCATION:  [facility]    Dictated by (CST): Claude Singh MD on 4/20/2025 at 0:37 AM     Finalized by (CST): Claude Singh MD on 4/20/2025 at 0:46 AM       XR CHEST AP PORTABLE  (CPT=71045)  Result Date: 4/19/2025  PROCEDURE:  XR CHEST AP PORTABLE  (CPT=71045)  TECHNIQUE:  AP chest radiograph was obtained.  COMPARISON:  [facility], XR, XR CHEST PA + LAT CHEST (HAY=81612), 4/04/2025, 5:25 PM.  INDICATIONS:  shortness of breath, abdominal pain, problem with fistula  PATIENT STATED HISTORY: (As transcribed by Technologist)  Patient has shortness of breath, dry cough, and chest tightness for around 2 weeks. Patient stated tonight he started having left sided abdomen pain.    FINDINGS:             CONCLUSION:  Stable cardiac and mediastinal contours with valvular prosthesis noted.  There continues to be confluent opacification of the right lower lung which likely represents some combination of atelectasis, airspace disease, and pleural effusion.  No appreciable pneumothorax.   LOCATION:  [facility]      Dictated by (CST): Claude Singh MD on 4/19/2025 at 11:02 PM     Finalized by (CST): Claude Singh MD on 4/19/2025 at 11:03 PM          ASSESSMENT / PLAN:     Patient is a 45 year old male with PMH sig for HTN, CHF, ESRD on HD, DM, bipolar do, pavan, here for abd pain    Impression    -N/V/D  -abd  pain  -elevated lipase     -ESRD on HD   -secondary hyperparathyroidism    -chronic HFpEF  -HTN, uncontrolled  -hx of MV repair    -DM 2    -MDD  -HILARIO  -Bipolar     Plan    *GI  -appears viral GE like symptoms  -check stool cx  -has upper abd pain - will check RUQ to eval GB. Mild lipase elevation but doubt pancreatitis - has had elevation previously  -if no improvement may need repeat CT    *CV  -on multiple bp meds at home, [facility], BP tends to drop with dialysis, will hold meds prior to dialysis. [facility] as well    *Endo  -iss    *renal  -HD today, [facility]  -getting US of LUE fistula. May need vascular eval too    Scds  Heparin      Further recommendations pending patient's clinical course. [facility] hospitalist to continue to follow patient while in house    Patient and/or patient's family given opportunity to ask questions and note understanding and agreeing with therapeutic plan as outlined    Mathew [facility] Hospitalist  [phone]  Answering Service: [phone]           [1]   Past Medical History:   Anxiety state    Arrhythmia    Asthma (HCC)    Attention deficit hyperactivity disorder (ADHD)    Back problem    Bipolar 1 disorder (HCC)    CKD (chronic kidney disease) stage 3, GFR 30-59 ml/min (HCC)    Dr Meeks    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HCC)    Congestive heart disease (HCC)    COPD (chronic obstructive pulmonary disease) (HCC)    Coronary atherosclerosis    Deep vein thrombosis (HCC)    at age 17 R/T cast    Depression    Diabetes (HCC)    Dialysis patient    Diverticulosis of large intestine    Essential hypertension    3/21 echo: severe concentric LVH with normal EF and no MR or pHTN    Extrinsic asthma, unspecified    Heart attack (HCC)    2016- angiogram- no intervention    Heart valve disease    mitral valve repair in 1994/    High blood pressure    High cholesterol    History of blood transfusion    History of mitral valve repair    Hyperlipidemia    Low back pain    tight and stiff  after sweeping and mopping    LVH (left ventricular hypertrophy)    Migraines    Mixed hyperlipidemia     HDL 38 LDL 97 VLDL 57     Monoclonal gammopathy    IgG kappa     Muscle weakness    MVP (mitral valve prolapse)    Repair 1994 at [place]; echoes as recently as 3/21 show mild or trivial MR and no stenosis    Neuropathy    Osteoarthritis    hip ,knees    Pneumonia due to organism    Pulmonary embolism (HCC)    Renal disorder    Stroke (HCC)    TIA (transient ischemic attack)    Initial history of left-sided weakness and slurred speech. (+) cocaine. MRI of the brain, CT angiogram of the head and neck, and 2D echo are all unremarkable.     TMJ (dislocation of temporomandibular joint)    Troponin level elevated    Trop 60 60 47 with TIA and no CP: Lexiscan negative with EF 51    Visual impairment   [2]   Past Surgical History:  Procedure Laterality Date    Av fistula revision, open Left     Cabg      Colonoscopy N/A 03/26/2023    Procedure: COLONOSCOPY;  Surgeon: Heath Vu MD;  Location:  ENDOSCOPY    Colonoscopy N/A 12/30/2023    Procedure: COLONOSCOPY with cold snare polypectomy and forcep polypectomy;  Surgeon: Ousmane Suarez MD;  Location:  ENDOSCOPY    Colonoscopy N/A 3/9/2025    Procedure: COLONOSCOPY WITH COLD SNARE POLYPECTOMY AND ENDO CLIPS X 2;  Surgeon: Bakari Noguera MD;  Location:  ENDOSCOPY    Colonoscopy & polypectomy  2019    [facility]  2019    Duodenitis. Biopsied. EUS for weight loss was negative    Heart surgery      Hernia surgery  08/17/2022    Dr Barnes    Laminectomy,>2 sgmt,lumbar  09/06/2018    L4-L5 Decomp Discectomy [facility] L4-L5    Mitralplasty w cp bypass  1994    [place]: Repair    Repair rotator cuff,chronic Left     torn and had a ruptured bicep    Sinus surgery        Spine surgery procedure unlisted      Valve repair  1994    mitral valve   [3]   Allergies  Allergen Reactions    Hydrochlorothiazide RASH and HIVES   [4]   Outpatient Medications Marked as Taking for  the 5/5/25 encounter (Hospital Encounter)   Medication Sig Dispense Refill    ferrous sulfate 325 (65 FE) MG Oral Tab EC Take 1 tablet (325 mg total) by mouth daily with breakfast.      tiotropium 18 MCG Inhalation Cap Inhale 1 capsule (18 mcg total) into the lungs in the morning.      cyclobenzaprine 5 MG Oral Tab Take 1 tablet (5 mg total) by mouth 3 (three) times daily as needed.      HYDROcodone-acetaminophen 5-325 MG Oral Tab Take 1 tablet by mouth every 6 (six) hours as needed for Pain.      pantoprazole 40 MG Oral Tab EC Take 1 tablet (40 mg total) by mouth every morning before breakfast.      benzonatate 200 MG Oral Cap Take 1 capsule (200 mg total) by mouth 3 (three) times daily as needed for cough. 20 capsule 0    polyethylene glycol, PEG 3350, 17 g Oral Powd Pack Take 17 g by mouth daily as needed.      atorvastatin 20 MG Oral Tab Take 1 tablet (20 mg total) by mouth every evening.      hydrALAZINE 25 MG Oral Tab Take 1 tablet (25 mg total) by mouth in the morning and 1 tablet (25 mg total) in the evening and 1 tablet (25 mg total) before bedtime.      cloNIDine 0.1 MG Oral Tab Take 1 tablet (0.1 mg total) by mouth 2 (two) times daily. 60 tablet 0    NIFEdipine ER 30 MG Oral Tablet 24 Hr Take 1 tablet (30 mg total) by mouth daily. 30 tablet 0    sevelamer carbonate 800 MG Oral Tab Take 3 tablets (2,400 mg total) by mouth 3 (three) times daily with meals. 270 tablet 0    calcium acetate 667 MG Oral Cap Take 1 capsule (667 mg total) by mouth with breakfast, with lunch, and with evening meal. 90 capsule 0    ARIPiprazole 5 MG Oral Tab Take 1 tablet (5 mg total) by mouth in the morning. Take 5mg (1 tablet) by mouth daily. Take each dose with 1 tablet of 10mg aripiprazole for a total daily dose of 15mg aripiprazole.      ARIPiprazole 10 MG Oral Tab Take 1 tablet (10 mg total) by mouth in the morning. Take 10mg (1 tablet) by mouth daily. Take each dose with 1 tablet of 5mg aripiprazole for a total daily dose of  15mg aripiprazole. .      ergocalciferol 1.25 MG (50379 UT) Oral Cap Take 1 capsule (50,000 Units total) by mouth once a week. Every Monday.      losartan 100 MG Oral Tab Take 1 tablet (100 mg total) by mouth in the morning.      gabapentin 100 MG Oral Cap Take 2 capsules (200 mg total) by mouth 3 (three) times daily. 180 capsule 0    VYVANSE 60 MG Oral Cap Take 1 capsule (60 mg total) by mouth every morning.      lamoTRIgine 100 MG Oral Tab Take 1 tablet (100 mg total) by mouth in the morning.      albuterol 108 (90 Base) MCG/ACT Inhalation Aero Soln Inhale 2 puffs into the lungs every 6 (six) hours as needed for Wheezing.      tamsulosin 0.4 MG Oral Cap Take 1 capsule (0.4 mg total) by mouth in the morning.      buPROPion  MG Oral Tablet 24 Hr Take 1 tablet (150 mg total) by mouth in the morning.      FLUoxetine HCl 40 MG Oral Cap Take 1 capsule (40 mg total) by mouth daily. 7 capsule 0    carvedilol 25 MG Oral Tab Take 1 tablet (25 mg total) by mouth in the morning and 1 tablet (25 mg total) in the evening. Take with meals. 60 tablet 6    Fluticasone Propionate 50 MCG/ACT Nasal Suspension SPRAY ONCE INTO EACH NOSTRIL BID PRN 15.8 mL 0   [5]   Family History  Problem Relation Age of Onset    Hypertension Father     Alcohol and Other Disorders Associated Father     Substance Abuse Father         cocaine    Dementia Father     Cancer Father         lung    Diabetes Mother     Cancer Mother         multiple myeloma    Hypertension Mother     Anxiety Maternal Aunt     Depression Maternal Aunt     Anxiety Maternal Aunt     Depression Maternal Aunt     Bipolar Disorder Maternal Aunt     Diabetes Maternal Grandmother     Hypertension Maternal Grandmother     Cancer Maternal Grandfather         stomach cancer    Diabetes Maternal Grandfather     Hypertension Maternal Grandfather     Alcohol and Other Disorders Associated Maternal Grandfather     Hypertension Paternal Grandmother     Hypertension Paternal Grandfather      Cancer Sister         uterine and ovarian    Hypertension Sister     Cancer Maternal Uncle         lung    Cancer Paternal Aunt         throat

## 2025-05-05 NOTE — ED INITIAL ASSESSMENT (HPI)
Patient arrives from home with c/o abdominal pain, nausea, vomiting, diarrhea, and joint pain since yesterday,

## 2025-05-05 NOTE — ED QUICK NOTES
Orders for admission, patient is aware of plan and ready to go upstairs. Any questions, please call ED RN Yohannes  at extension [phone].     Vaccinated? yes  Type of COVID test sent:no  COVID Suspicion level: L      Titratable drug(s) infusin.9 Nacl at 125ml/hr  Rate:    LOC at time of transport: Ox3    Other pertinent information: pt goes to dialysis M,W,F (missed today)  Nausea better but not gone with Zofran. Dilaudid relieves pain briefly  CIWA score=  NIH score=

## 2025-05-05 NOTE — CONSULTS
[facility]   part of [facility]    Report of Consultation    Godwin Fonseca Patient Status:  Inpatient    1980 MRN [id number]   Location [facility] 3NW-A Attending Yuriy Forrest MD   Hosp Day # 0 PCP Adrian Small MD       REASON FOR CONSULT:     ESRD    HISTORY OF PRESENT ILLNESS:     44 yo M with history of ESRD on iHD MWF via LUE AVF, HTN, severe MR s/p MVR x 2, HFpEF, DVT/PE, TIA, MGUS, bipolar disorder, recurrent GI bleed, diffuse body pains presents with n/v/abdominal pain/diarrhea x 1 day. Missed HD today. Nephrology consulted for ESRD management.  Notes that left arm is swollen around fistula. He had a CVC placed for dialysis in [facility] last week he reports.  No leg swelling.    REVIEW OF SYSTEMS:     Please see HPI for pertinent positives. 10 point review of systems otherwise reviewed and negative.     HISTORY:     Past Medical History[1]  Past Surgical History[2]  Family History[3]   reports that he quit smoking about 3 years ago. His smoking use included cigarettes. He started smoking about 30 years ago. He has a 27 pack-year smoking history. He has never been exposed to tobacco smoke. He has never used smokeless tobacco. He reports that he does not drink alcohol and does not use drugs.    ALLERGIES:     Allergies[4]    MEDICATIONS:     Current Hospital Medications[5]  Prior to Admission Medications[6]      PHYSICAL EXAM:     Vital Signs: BP (!) 192/126 (BP Location: Right arm)   Pulse 93   Temp 97.7 °F (36.5 °C) (Oral)   Resp 22   Wt 237 lb 7 oz (107.7 kg)   SpO2 92%   BMI 30.48 kg/m²   Temp (24hrs), Av.9 °F (36.6 °C), Min:97.7 °F (36.5 °C), Max:98.1 °F (36.7 °C)       Intake/Output Summary (Last 24 hours) at 2025 1136  Last data filed at 2025 1059  Gross per 24 hour   Intake 0 ml   Output --   Net 0 ml     Wt Readings from Last 3 Encounters:   25 237 lb 7 oz (107.7 kg)   25 237 lb 6.4 oz (107.7 kg)   25 229 lb 14.4 oz (104.3 kg)     Gen -  sitting in bed, itching  HEENT - NCAT  CV - RRR  Resp - CTAB  Abd - soft   - no lobo  Ext - warm no leg swelling  Vascular - LIJ tunnelled HD catheter, LUE AVF + thrill and bruit with distal small aneurysmal dilation    LABORATORY DATA:       Lab Results   Component Value Date     (H) 05/05/2025    BUN 66 (H) 05/05/2025    BUNCREA 13.0 03/07/2022    CREATSERUM 12.21 (H) 05/05/2025    ANIONGAP 15 05/05/2025    GFR 59 (L) 01/04/2018    GFRNAA 30 (L) 07/12/2022    GFRAA 35 (L) 07/12/2022    CA 8.2 (L) 05/05/2025    OSMOCALC 309 (H) 05/05/2025    ALKPHO 131 (H) 05/05/2025    AST 32 05/05/2025    ALT 28 05/05/2025    BILT 0.3 05/05/2025    TP 7.6 05/05/2025    ALB 4.1 05/05/2025    GLOBULIN 3.5 05/05/2025     05/05/2025    K 4.3 05/05/2025     05/05/2025    CO2 22.0 05/05/2025     Lab Results   Component Value Date    WBC 7.4 05/05/2025    RBC 2.79 (L) 05/05/2025    HGB 9.0 (L) 05/05/2025    HCT 26.0 (L) 05/05/2025    .0 05/05/2025    MPV 11.5 12/14/2012    MCV 93.2 05/05/2025    MCH 32.3 05/05/2025    MCHC 34.6 05/05/2025    RDW 12.6 05/05/2025    NEPRELIM 5.14 05/05/2025    NEPERCENT 69.3 05/05/2025    LYPERCENT 15.7 05/05/2025    MOPERCENT 10.0 05/05/2025    EOPERCENT 3.5 05/05/2025    BAPERCENT 1.2 05/05/2025    NE 5.14 05/05/2025    LYMABS 1.16 05/05/2025    MOABSO 0.74 05/05/2025    EOABSO 0.26 05/05/2025    BAABSO 0.09 05/05/2025     Lab Results   Component Value Date    MALBP 167.00 05/24/2023    CREUR 142.00 05/24/2023    CREUR 142.00 05/24/2023     Lab Results   Component Value Date    COLORUR Light-Yellow 03/30/2025    CLARITY Clear 03/30/2025    SPECGRAVITY 1.028 03/30/2025    GLUUR Normal 03/30/2025    BILUR Negative 03/30/2025    KETUR Negative 03/30/2025    BLOODURINE 2+ (A) 03/30/2025    PHURINE 6.5 03/30/2025    PROUR 100 (A) 03/30/2025    UROBILINOGEN Normal 03/30/2025    NITRITE Negative 03/30/2025    LEUUR Negative 03/30/2025    WBCUR 1-5 03/30/2025    RBCUR 0-2 03/30/2025     EPIUR Few (A) 03/30/2025    BACUR None Seen 03/30/2025    CAOXUR Occasional (A) 04/20/2018    HYLUR Present (A) 05/14/2019         IMAGING:     Reviewed.      ASSESSMENT/PLAN:     46 yo M with history of ESRD on iHD MWF via LUE AVF, HTN, severe MR s/p MVR x 2, HFpEF, DVT/PE, TIA, MGUS, bipolar disorder, recurrent GI bleed, diffuse body pains presents with n/v/abdominal pain.    ESRD:  -- HD today 2-3L UF as tolerated  -- avoid morphine  -- gabapentin should be renally dosed - defer to RPh/primary service     Anemia:  -- epo with HD if BP lower; not today     MBD:  -- trend phos  -- sevelamer 2400 TID AC  -- calcium acetate 667 TIDAC  -- low phos diet     HTN:  -- clonidine should be continued BID to avoid rebound  -- takes remaining antihypertensives after HD; hold pre HD for now since he has dropped BP with UF in the past here     LUE AVF with arm swelling:  -- L arm precautions  -- US duplex dialysis access  -- consider vascular surgery evaluation     D/w Dr. Elizabeth. Will follow.    Thank you for allowing me to participate in the care of your patient. Please do not hesitate to contact me with concerns or questions.    Lenka Bond MD  [facility]         [1]   Past Medical History:   Anxiety state    Arrhythmia    Asthma (HCC)    Attention deficit hyperactivity disorder (ADHD)    Back problem    Bipolar 1 disorder (HCC)    CKD (chronic kidney disease) stage 3, GFR 30-59 ml/min ([facility])    Dr Meeks    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[facility])    Congestive heart disease (HCC)    COPD (chronic obstructive pulmonary disease) ([facility])    Coronary atherosclerosis    Deep vein thrombosis ([facility])    at age 17 R/T cast    Depression    Diabetes ([facility])    Dialysis patient    Diverticulosis of large intestine    Essential hypertension    3/21 echo: severe concentric LVH with normal EF and no MR or pHTN    Extrinsic asthma, unspecified    Heart attack ([facility])    2016- angiogram- no intervention    Heart valve disease    mitral valve  repair in 1994/    High blood pressure    High cholesterol    History of blood transfusion    History of mitral valve repair    Hyperlipidemia    Low back pain    tight and stiff after sweeping and mopping    LVH (left ventricular hypertrophy)    Migraines    Mixed hyperlipidemia     HDL 38 LDL 97 VLDL 57     Monoclonal gammopathy    IgG kappa     Muscle weakness    MVP (mitral valve prolapse)    Repair 1994 at [place]; echoes as recently as 3/21 show mild or trivial MR and no stenosis    Neuropathy    Osteoarthritis    hip ,knees    Pneumonia due to organism    Pulmonary embolism (HCC)    Renal disorder    Stroke (HCC)    TIA (transient ischemic attack)    Initial history of left-sided weakness and slurred speech. (+) cocaine. MRI of the brain, CT angiogram of the head and neck, and 2D echo are all unremarkable.     TMJ (dislocation of temporomandibular joint)    Troponin level elevated    Trop 60 60 47 with TIA and no CP: Lexiscan negative with EF 51    Visual impairment   [2]   Past Surgical History:  Procedure Laterality Date    Av fistula revision, open Left     Cabg      Colonoscopy N/A 03/26/2023    Procedure: COLONOSCOPY;  Surgeon: Heath Vu MD;  Location:  ENDOSCOPY    Colonoscopy N/A 12/30/2023    Procedure: COLONOSCOPY with cold snare polypectomy and forcep polypectomy;  Surgeon: Ousmane Suarez MD;  Location:  ENDOSCOPY    Colonoscopy N/A 3/9/2025    Procedure: COLONOSCOPY WITH COLD SNARE POLYPECTOMY AND ENDO CLIPS X 2;  Surgeon: Bakari Noguera MD;  Location:  ENDOSCOPY    Colonoscopy & polypectomy  2019    Egd  2019    Duodenitis. Biopsied. EUS for weight loss was negative    Heart surgery      Hernia surgery  08/17/2022    Dr Barnes    Laminectomy,>2 sgmt,lumbar  09/06/2018    L4-L5 Decomp Discectomy ROEM L4-L5    Mitralplasty w cp bypass  1994    [place]: Repair    Repair rotator cuff,chronic Left     torn and had a ruptured bicep    Sinus surgery        Spine surgery  procedure unlisted      Valve repair  1994    mitral valve   [3]   Family History  Problem Relation Age of Onset    Hypertension Father     Alcohol and Other Disorders Associated Father     Substance Abuse Father         cocaine    Dementia Father     Cancer Father         lung    Diabetes Mother     Cancer Mother         multiple myeloma    Hypertension Mother     Anxiety Maternal Aunt     Depression Maternal Aunt     Anxiety Maternal Aunt     Depression Maternal Aunt     Bipolar Disorder Maternal Aunt     Diabetes Maternal Grandmother     Hypertension Maternal Grandmother     Cancer Maternal Grandfather         stomach cancer    Diabetes Maternal Grandfather     Hypertension Maternal Grandfather     Alcohol and Other Disorders Associated Maternal Grandfather     Hypertension Paternal Grandmother     Hypertension Paternal Grandfather     Cancer Sister         uterine and ovarian    Hypertension Sister     Cancer Maternal Uncle         lung    Cancer Paternal Aunt         throat   [4]   Allergies  Allergen Reactions    Hydrochlorothiazide RASH and HIVES   [5]   Current Facility-Administered Medications:     sodium chloride 0.9 % IV bolus 100 mL, 100 mL, Intravenous, Q30 Min PRN **AND** albumin human (Albumin) 25% injection 25 g, 25 g, Intravenous, PRN Dialysis  [6]   No current outpatient medications on file.

## 2025-05-06 ENCOUNTER — APPOINTMENT (OUTPATIENT)
Dept: ULTRASOUND IMAGING | Facility: HOSPITAL | Age: 45
End: 2025-05-06
Attending: HOSPITALIST
Payer: MEDICARE

## 2025-05-06 LAB
ALBUMIN SERPL-MCNC: 4.3 G/DL (ref 3.2–4.8)
ALBUMIN SERPL-MCNC: 4.3 G/DL (ref 3.2–4.8)
ALBUMIN/GLOB SERPL: 1.3 {RATIO} (ref 1–2)
ALP LIVER SERPL-CCNC: 130 U/L (ref 45–117)
ALT SERPL-CCNC: 17 U/L (ref 10–49)
ANION GAP SERPL CALC-SCNC: 12 MMOL/L (ref 0–18)
ANION GAP SERPL CALC-SCNC: 12 MMOL/L (ref 0–18)
AST SERPL-CCNC: 26 U/L (ref ?–34)
BILIRUB SERPL-MCNC: 0.5 MG/DL (ref 0.3–1.2)
BUN BLD-MCNC: 36 MG/DL (ref 9–23)
BUN BLD-MCNC: 36 MG/DL (ref 9–23)
CALCIUM BLD-MCNC: 8.7 MG/DL (ref 8.7–10.6)
CALCIUM BLD-MCNC: 8.7 MG/DL (ref 8.7–10.6)
CHLORIDE SERPL-SCNC: 101 MMOL/L (ref 98–112)
CHLORIDE SERPL-SCNC: 101 MMOL/L (ref 98–112)
CO2 SERPL-SCNC: 25 MMOL/L (ref 21–32)
CO2 SERPL-SCNC: 25 MMOL/L (ref 21–32)
CREAT BLD-MCNC: 8.84 MG/DL (ref 0.7–1.3)
CREAT BLD-MCNC: 8.84 MG/DL (ref 0.7–1.3)
EGFRCR SERPLBLD CKD-EPI 2021: 7 ML/MIN/1.73M2 (ref 60–?)
EGFRCR SERPLBLD CKD-EPI 2021: 7 ML/MIN/1.73M2 (ref 60–?)
ERYTHROCYTE [DISTWIDTH] IN BLOOD BY AUTOMATED COUNT: 12.6 %
GLOBULIN PLAS-MCNC: 3.2 G/DL (ref 2–3.5)
GLUCOSE BLD-MCNC: 143 MG/DL (ref 70–99)
GLUCOSE BLD-MCNC: 144 MG/DL (ref 70–99)
GLUCOSE BLD-MCNC: 82 MG/DL (ref 70–99)
GLUCOSE BLD-MCNC: 93 MG/DL (ref 70–99)
GLUCOSE BLD-MCNC: 93 MG/DL (ref 70–99)
GLUCOSE BLD-MCNC: 96 MG/DL (ref 70–99)
HCT VFR BLD AUTO: 24.7 % (ref 39–53)
HGB BLD-MCNC: 8.6 G/DL (ref 13–17.5)
LIPASE SERPL-CCNC: 53 U/L (ref 12–53)
MAGNESIUM SERPL-MCNC: 1.8 MG/DL (ref 1.6–2.6)
MCH RBC QN AUTO: 32 PG (ref 26–34)
MCHC RBC AUTO-ENTMCNC: 34.8 G/DL (ref 31–37)
MCV RBC AUTO: 91.8 FL (ref 80–100)
OSMOLALITY SERPL CALC.SUM OF ELEC: 294 MOSM/KG (ref 275–295)
OSMOLALITY SERPL CALC.SUM OF ELEC: 294 MOSM/KG (ref 275–295)
PHOSPHATE SERPL-MCNC: 5.5 MG/DL (ref 2.4–5.1)
PLATELET # BLD AUTO: 209 10(3)UL (ref 150–450)
POTASSIUM SERPL-SCNC: 4.1 MMOL/L (ref 3.5–5.1)
POTASSIUM SERPL-SCNC: 4.1 MMOL/L (ref 3.5–5.1)
PROT SERPL-MCNC: 7.5 G/DL (ref 5.7–8.2)
RBC # BLD AUTO: 2.69 X10(6)UL (ref 4.3–5.7)
SODIUM SERPL-SCNC: 138 MMOL/L (ref 136–145)
SODIUM SERPL-SCNC: 138 MMOL/L (ref 136–145)
WBC # BLD AUTO: 5.9 X10(3) UL (ref 4–11)

## 2025-05-06 PROCEDURE — 84100 ASSAY OF PHOSPHORUS: CPT | Performed by: HOSPITALIST

## 2025-05-06 PROCEDURE — 85027 COMPLETE CBC AUTOMATED: CPT | Performed by: HOSPITALIST

## 2025-05-06 PROCEDURE — 83735 ASSAY OF MAGNESIUM: CPT | Performed by: HOSPITALIST

## 2025-05-06 PROCEDURE — 82962 GLUCOSE BLOOD TEST: CPT

## 2025-05-06 PROCEDURE — 83690 ASSAY OF LIPASE: CPT | Performed by: HOSPITALIST

## 2025-05-06 PROCEDURE — 80053 COMPREHEN METABOLIC PANEL: CPT | Performed by: HOSPITALIST

## 2025-05-06 PROCEDURE — 76705 ECHO EXAM OF ABDOMEN: CPT | Performed by: HOSPITALIST

## 2025-05-06 RX ORDER — ALBUMIN (HUMAN) 12.5 G/50ML
25 SOLUTION INTRAVENOUS
Status: ACTIVE | OUTPATIENT
Start: 2025-05-06 | End: 2025-05-08

## 2025-05-06 NOTE — PROGRESS NOTES
Alert and oriented x 4    Afebrile. VSS  Note BP elevated.  BP medications given.  Will monitor.  Patient to have dialysis today.  O2 sat WNL on room air.  C/O abdomen pain.  Medicated per orders with relief of pain.    Denies nausea or emesis.  Started on clear liquids per orders and tolerated it well.  US to be done in am.  NPO after midnight.  Right FA IV intact. Saline locked.  [facility] contacted and will come today to do dialysis.

## 2025-05-06 NOTE — PROGRESS NOTES
[facility]   part of [facility]    Nephrology Progress Note    Godwin Fonseca Attending:  Yuriy Forrest MD       SUBJECTIVE:     Patient underwent HD yesterday - reports no issues with the treatment. UF not documented in chart but he thinks 3L UF. No cramping, sob, leg swelling. He feels his left arm is a little sore.    PHYSICAL EXAM:     Vital Signs: /87 (BP Location: Right arm)   Pulse 79   Temp 98.7 °F (37.1 °C) (Oral)   Resp 18   Wt 237 lb 7 oz (107.7 kg)   SpO2 97%   BMI 30.48 kg/m²   Temp (24hrs), Av °F (36.7 °C), Min:97.4 °F (36.3 °C), Max:98.7 °F (37.1 °C)       Intake/Output Summary (Last 24 hours) at 2025 1215  Last data filed at 2025 0530  Gross per 24 hour   Intake 720 ml   Output 0 ml   Net 720 ml     Wt Readings from Last 3 Encounters:   25 237 lb 7 oz (107.7 kg)   25 237 lb 6.4 oz (107.7 kg)   25 229 lb 14.4 oz (104.3 kg)     Gen - laying in bed, nad  HEENT - NCAT  CV - RRR  Resp - CTAB  Abd - soft   - no lobo  Ext - warm no leg swelling  Vascular - LIJ tunnelled HD catheter, LUE AVF + thrill and bruit with distal small aneurysmal dilation       LABORATORY DATA:     Lab Results   Component Value Date    GLU 93 2025    GLU 93 2025    BUN 36 (H) 2025    BUN 36 (H) 2025    BUNCREA 13.0 2022    CREATSERUM 8.84 (H) 2025    CREATSERUM 8.84 (H) 2025    ANIONGAP 12 2025    ANIONGAP 12 2025    GFR 59 (L) 2018    GFRNAA 30 (L) 2022    GFRAA 35 (L) 2022    CA 8.7 2025    CA 8.7 2025    OSMOCALC 294 2025    OSMOCALC 294 2025    ALKPHO 130 (H) 2025    AST 26 2025    ALT 17 2025    BILT 0.5 2025    TP 7.5 2025    ALB 4.3 2025    ALB 4.3 2025    GLOBULIN 3.2 2025     2025     2025    K 4.1 2025    K 4.1 2025     2025     2025    CO2 25.0 2025    CO2  25.0 05/06/2025     Lab Results   Component Value Date    WBC 5.9 05/06/2025    RBC 2.69 (L) 05/06/2025    HGB 8.6 (L) 05/06/2025    HCT 24.7 (L) 05/06/2025    .0 05/06/2025    MPV 11.5 12/14/2012    MCV 91.8 05/06/2025    MCH 32.0 05/06/2025    MCHC 34.8 05/06/2025    RDW 12.6 05/06/2025    NEPRELIM 5.14 05/05/2025    NEPERCENT 69.3 05/05/2025    LYPERCENT 15.7 05/05/2025    MOPERCENT 10.0 05/05/2025    EOPERCENT 3.5 05/05/2025    BAPERCENT 1.2 05/05/2025    NE 5.14 05/05/2025    LYMABS 1.16 05/05/2025    MOABSO 0.74 05/05/2025    EOABSO 0.26 05/05/2025    BAABSO 0.09 05/05/2025     Lab Results   Component Value Date    MALBP 167.00 05/24/2023    CREUR 142.00 05/24/2023    CREUR 142.00 05/24/2023     Lab Results   Component Value Date    COLORUR Light-Yellow 03/30/2025    CLARITY Clear 03/30/2025    SPECGRAVITY 1.028 03/30/2025    GLUUR Normal 03/30/2025    BILUR Negative 03/30/2025    KETUR Negative 03/30/2025    BLOODURINE 2+ (A) 03/30/2025    PHURINE 6.5 03/30/2025    PROUR 100 (A) 03/30/2025    UROBILINOGEN Normal 03/30/2025    NITRITE Negative 03/30/2025    LEUUR Negative 03/30/2025    WBCUR 1-5 03/30/2025    RBCUR 0-2 03/30/2025    EPIUR Few (A) 03/30/2025    BACUR None Seen 03/30/2025    CAOXUR Occasional (A) 04/20/2018    HYLUR Present (A) 05/14/2019         IMAGING:     Reviewed.    MEDICATIONS:       Current Hospital Medications[1]    ASSESSMENT/PLAN:     44 yo M with history of ESRD on iHD MWF via LUE AVF, HTN, severe MR s/p MVR x 2, HFpEF, DVT/PE, TIA, MGUS, bipolar disorder, recurrent GI bleed, diffuse body pains presents with n/v/abdominal pain.     ESRD:  -- HD MWF per outpatient schedule  -- avoid morphine  -- gabapentin should be renally dosed     Anemia:  -- epo with HD tomorrow     MBD:  -- trend phos  -- sevelamer 2400 TID AC  -- calcium acetate 667 TIDAC  -- low phos diet     HTN:  -- clonidine should be continued BID to avoid rebound  -- takes remaining antihypertensives after HD; hold  pre HD for now since he has dropped BP with UF in the past here     LUE AVF with arm swelling:  -- L arm precautions  -- US duplex dialysis access  -- consider vascular surgery evaluation      D/w Dr. Elizabeth. Will follow.    Thank you for allowing me to participate in the care of this patient. Please do not hesitate to call with questions or concerns.        [facility]         [1]   Current Facility-Administered Medications   Medication Dose Route Frequency    sodium chloride 0.9 % IV bolus 100 mL  100 mL Intravenous Q30 Min PRN    And    albumin human (Albumin) 25% injection 25 g  25 g Intravenous PRN Dialysis    cloNIDine (Catapres) tab 0.1 mg  0.1 mg Oral BID    albuterol (Ventolin HFA) 108 (90 Base) MCG/ACT inhaler 2 puff  2 puff Inhalation Q6H PRN    ARIPiprazole (Abilify) tab 15 mg  15 mg Oral Daily    atorvastatin (Lipitor) tab 20 mg  20 mg Oral QPM    buPROPion ER (Wellbutrin XL) 24 hr tab 150 mg  150 mg Oral Daily    calcium acetate (Phoslo) cap 667 mg  667 mg Oral TID with meals    carvedilol (Coreg) tab 25 mg  25 mg Oral BID with meals    FLUoxetine (PROzac) cap 40 mg  40 mg Oral Daily    gabapentin (Neurontin) cap 200 mg  200 mg Oral TID    hydrALAZINE (Apresoline) tab 25 mg  25 mg Oral TID    HYDROcodone-acetaminophen (Norco) 5-325 MG per tab 1 tablet  1 tablet Oral Q6H PRN    lamoTRIgine (LaMICtal) tab 100 mg  100 mg Oral Daily    losartan (Cozaar) tab 100 mg  100 mg Oral Daily    NIFEdipine ER (Procardia-XL) 24 hr tab 30 mg  30 mg Oral Daily    pantoprazole (Protonix) DR tab 40 mg  40 mg Oral QAM AC    sevelamer carbonate (Renvela) tab 2,400 mg  2,400 mg Oral TID CC    tamsulosin (Flomax) cap 0.4 mg  0.4 mg Oral Daily    umeclidinium bromide (Incruse Ellipta) 62.5 MCG/ACT inhaler 1 puff  1 puff Inhalation Daily    heparin (Porcine) 5000 UNIT/ML injection 5,000 Units  5,000 Units Subcutaneous Q8H MARTHA    acetaminophen (Tylenol Extra Strength) tab 500 mg  500 mg Oral Q6H PRN     HYDROmorphone (Dilaudid) 1 MG/ML injection 0.2 mg  0.2 mg Intravenous Q2H PRN    Or    HYDROmorphone (Dilaudid) 1 MG/ML injection 0.4 mg  0.4 mg Intravenous Q2H PRN    Or    HYDROmorphone (Dilaudid) 1 MG/ML injection 0.8 mg  0.8 mg Intravenous Q2H PRN    glucose (Dex4) 15 GM/59ML oral liquid 15 g  15 g Oral Q15 Min PRN    Or    glucose (Glutose) 40% oral gel 15 g  15 g Oral Q15 Min PRN    Or    glucose-vitamin C (Dex-4) chewable tab 4 tablet  4 tablet Oral Q15 Min PRN    Or    dextrose 50% injection 50 mL  50 mL Intravenous Q15 Min PRN    Or    glucose (Dex4) 15 GM/59ML oral liquid 30 g  30 g Oral Q15 Min PRN    Or    glucose (Glutose) 40% oral gel 30 g  30 g Oral Q15 Min PRN    Or    glucose-vitamin C (Dex-4) chewable tab 8 tablet  8 tablet Oral Q15 Min PRN    insulin aspart (NovoLOG) 100 Units/mL FlexPen 1-5 Units  1-5 Units Subcutaneous TID AC and HS

## 2025-05-06 NOTE — PROGRESS NOTES
CC: follow-up hospital admission abd pain    SUBJECTIVE:  Interval History:     Feels pain is better  No diarrhea  No emesis  Would like to eat    OBJECTIVE:  Scheduled Meds: Scheduled Medications[1]  Continuous Infusions: Medication Infusions[2]  PRN Meds: PRN Medications[3]    PHYSICAL EXAM  Vital signs: Temp:  [97.4 °F (36.3 °C)-98.7 °F (37.1 °C)] 98.7 °F (37.1 °C)  Pulse:  [78-91] 79  Resp:  [18-22] 18  BP: (119-152)/() 147/87  SpO2:  [92 %-97 %] 97 %      GENERAL - NAD, AAO  EYES- sclera anicteric   HENT- normocephalic,   NECK - no JVD  CV- RRR  RESP - CTAB, normal resp effort  ABDOMEN- soft, mild ttp in upper abd  EXT- no LE edema        Data Review:   Labs:   Recent Labs   Lab 05/05/25  0627 05/06/25  0540   WBC 7.4 5.9   HGB 9.0* 8.6*   MCV 93.2 91.8   .0 209.0       Recent Labs   Lab 04/30/25  0559 05/05/25  0627 05/06/25  0540    137 138  138   K 4.1 4.3 4.1  4.1    100 101  101   CO2 24.0 22.0 25.0  25.0   BUN 57* 66* 36*  36*   CREATSERUM 10.62* 12.21* 8.84*  8.84*   CA 9.6 8.2* 8.7  8.7   MG 2.3  --  1.8   PHOS 6.4*  --  5.5*   * 203* 93  93       Recent Labs   Lab 04/30/25  0559 05/05/25  0627 05/06/25  0540   ALT  --  28 17   AST  --  32 26   ALB 4.3 4.1 4.3  4.3       Recent Labs   Lab 05/05/25  1541 05/05/25  2148 05/06/25  0522   PGLU 98 148* 96           ASSESSMENT/PLAN:  Patient is a 45 year old male with PMH sig for HTN, CHF, ESRD on HD, DM, bipolar do, pavan, here for abd pain     Impression     -N/V/D  -abd pain  -elevated lipase      -ESRD on HD   -secondary hyperparathyroidism     -chronic HFpEF  -HTN, uncontrolled  -hx of MV repair     -DM 2     -MDD  -PAVAN  -Bipolar      Plan     *GI  -appears viral GE like symptoms  -check stool cx - no stools  -has upper abd pain - will check RUQ to eval GB. Mild lipase elevation but doubt pancreatitis - has had elevation previously - GB with sludge, borderline CBD  -will start on diet. If any worsening pain can  check MRCP     *CV  -on multiple bp meds at home, cont      *Endo  -iss     *renal  -HD MWF  dw Dr Bond  -getting US of LUE fistula.  Dw Dr Coombs, was supposed to see around this time -rec outpt fu     Scds  Heparin      Will continue to follow while hospitalized. Please page me or the on-call hospitalist with questions or concerns.    Mathew Brown Hospitalist  [phone]  Answering Service: [phone]         [1]    cloNIDine  0.1 mg Oral BID    ARIPiprazole  15 mg Oral Daily    atorvastatin  20 mg Oral QPM    buPROPion ER  150 mg Oral Daily    calcium acetate  667 mg Oral TID with meals    carvedilol  25 mg Oral BID with meals    FLUoxetine HCl  40 mg Oral Daily    gabapentin  200 mg Oral TID    hydrALAZINE  25 mg Oral TID    lamoTRIgine  100 mg Oral Daily    losartan  100 mg Oral Daily    NIFEdipine ER  30 mg Oral Daily    pantoprazole  40 mg Oral QAM AC    sevelamer carbonate  2,400 mg Oral TID CC    tamsulosin  0.4 mg Oral Daily    umeclidinium bromide  1 puff Inhalation Daily    heparin  5,000 Units Subcutaneous Q8H MARTHA    insulin aspart  1-5 Units Subcutaneous TID AC and HS   [2] [3]   sodium chloride **AND** albumin human    albuterol    HYDROcodone-acetaminophen    acetaminophen    HYDROmorphone **OR** HYDROmorphone **OR** HYDROmorphone    glucose **OR** glucose **OR** glucose-vitamin C **OR** dextrose **OR** glucose **OR** glucose **OR** glucose-vitamin C

## 2025-05-06 NOTE — PLAN OF CARE
Alert x 4. VSS on RA. Dialysis complete. Complains of mild abdominal pain. Tolerating clear liquid diet. Refused BP meds tonight stating his BP drops after dialysis. NPO at midnight for scans in AM. POC discussed. All current needs met. Call light in reach

## 2025-05-06 NOTE — CM/SW NOTE
05/06/25 0900   CM/SW Referral Data   Referral Source    Reason for Referral Discharge planning   Informant Patient     Patient is a 45 year old male admitted for abd pain.     Met with pt to determine discharge needs. Pt stating that he is not longer current with [facility]. Pt lives at home with mother and is iADLs. Pt drives himself to dialysis. Pt is current with [facility] for HD-MWF.    [facility]   [address]   [place] [address]      Elia Bear, MAKAYLA RN, CM  [phone]

## 2025-05-06 NOTE — PLAN OF CARE
ED admit 5/5 ABD pain and Lt arm fistula swelling, Pt is AAOX4, VSS, room air, TELE, UA and stool panel pending, glucose monitored and treated per orders, up ad osvaldo, US of ABD complete, US of Lt arm pending, IV / PO meds for pain, see MAR, will CTM.

## 2025-05-07 ENCOUNTER — APPOINTMENT (OUTPATIENT)
Dept: ULTRASOUND IMAGING | Facility: HOSPITAL | Age: 45
End: 2025-05-07
Attending: INTERNAL MEDICINE
Payer: MEDICARE

## 2025-05-07 LAB
ALBUMIN SERPL-MCNC: 3.8 G/DL (ref 3.2–4.8)
ANION GAP SERPL CALC-SCNC: 15 MMOL/L (ref 0–18)
BUN BLD-MCNC: 33 MG/DL (ref 9–23)
CALCIUM BLD-MCNC: 8.5 MG/DL (ref 8.7–10.6)
CHLORIDE SERPL-SCNC: 100 MMOL/L (ref 98–112)
CO2 SERPL-SCNC: 18 MMOL/L (ref 21–32)
CREAT BLD-MCNC: 9.98 MG/DL (ref 0.7–1.3)
EGFRCR SERPLBLD CKD-EPI 2021: 6 ML/MIN/1.73M2 (ref 60–?)
GLUCOSE BLD-MCNC: 101 MG/DL (ref 70–99)
GLUCOSE BLD-MCNC: 101 MG/DL (ref 70–99)
GLUCOSE BLD-MCNC: 112 MG/DL (ref 70–99)
GLUCOSE BLD-MCNC: 122 MG/DL (ref 70–99)
GLUCOSE BLD-MCNC: 142 MG/DL (ref 70–99)
OSMOLALITY SERPL CALC.SUM OF ELEC: 283 MOSM/KG (ref 275–295)
PHOSPHATE SERPL-MCNC: 6.6 MG/DL (ref 2.4–5.1)
POTASSIUM SERPL-SCNC: 4.2 MMOL/L (ref 3.5–5.1)
SODIUM SERPL-SCNC: 133 MMOL/L (ref 136–145)

## 2025-05-07 PROCEDURE — 93990 DOPPLER FLOW TESTING: CPT | Performed by: INTERNAL MEDICINE

## 2025-05-07 PROCEDURE — 90935 HEMODIALYSIS ONE EVALUATION: CPT | Performed by: INTERNAL MEDICINE

## 2025-05-07 PROCEDURE — 80069 RENAL FUNCTION PANEL: CPT | Performed by: INTERNAL MEDICINE

## 2025-05-07 PROCEDURE — 94760 N-INVAS EAR/PLS OXIMETRY 1: CPT

## 2025-05-07 PROCEDURE — 82962 GLUCOSE BLOOD TEST: CPT

## 2025-05-07 RX ORDER — HEPARIN SODIUM 1000 [USP'U]/ML
4000 INJECTION, SOLUTION INTRAVENOUS; SUBCUTANEOUS
Status: DISCONTINUED | OUTPATIENT
Start: 2025-05-07 | End: 2025-05-09

## 2025-05-07 NOTE — CONSULTS
GASTROENTEROLOGY CONSULTATION  Ousmane Suarez MD    Department of Gastroenterology  [facility]    Godwin Fonseca Patient Status:  Inpatient    1980 MRN [id number]   [facility] 3NW-A Attending Yuriy Forrest MD   Hosp Day # 2 PCP Adrian Small MD     Reason for Consultation:  Abdominal pain  Nausea and vomiting    History of Present Illness:  Godwin Fonseca is a a(n) 45 year old male with ESRD on HD, HTN, MR s/p MVR, DVT / PE, TIA, bipolar disorder, recurrent GI bleed.  GI consult requested for 1 day of n/v/diarrhea and abdominal pain.  Pt missed HD.  Pt describes 1 year of upper abdominal pain, episodic, last minutes to an hour, burning, associated with nausea and vomiting about 2 -3 days per week   On admission, lipase 123.  Normal liver function tests.  US abd showed mild GB sludge and cbd 7 mm.  MRCP ordered.  Colonoscopy 3/9/25 for rectal bleeding - diverticulosis and small polyps.  Normal VCE 10/2024.  Umbilical hernia repaired 2022    History:  Past Medical History[1]  Past Surgical History[2]  Family History[3]   reports that he quit smoking about 3 years ago. His smoking use included cigarettes. He started smoking about 30 years ago. He has a 27 pack-year smoking history. He has never been exposed to tobacco smoke. He has never used smokeless tobacco. He reports that he does not drink alcohol and does not use drugs.    Allergies:  Allergies[4]    Medications:  Current Hospital Medications[5]    Review of Systems:  Gastrointestinal: See above  General: Denies fatigue, chills/fever, night sweats, weight loss, loss of appetite, weight gain, sleep disturbance.  Cardiovascular: Denies history of heart murmur, chest pain or angina.  Respiratory: Denies shortness of breath, chronic/frequent hoarseness, wheezing, chronic cough, cough up sputum.  Genitourinary: Denies kidney stones, painful/difficult urination, frequent urinary infections, frequent urination, blood in urine,  incontinence, kidney failure.  Psychosocial: noncontributory  Neuro: no tremor, dysarthria, gait disturbance  Skin: Denies severe itching, unusual moles, rash, flushing, change in hair or nails.  Bone/joint: No joint pain or inflammation  Heme/Lymphatic: Denies easy bruising, anemia, excessive bleeding, enlarging or painful lymph nodes.  Allergy: Denies medication allergy, latex/rubber allergy, anaphylactic or other reaction to anesthesia, food allergy.   Eyes: Denies blurred/double vision, eye disease, glasses or contacts, glaucoma.  ENT: Denies nose or gums bleeding, mouth sores, bad breath or bad taste in mouth, hearing loss.  Physical Exam:    Blood pressure (!) 132/97, pulse 82, temperature 98.5 °F (36.9 °C), temperature source Oral, resp. rate 16, weight 237 lb 7 oz (107.7 kg), SpO2 96%.    General: Appears alert, oriented x3 and in no acute distress.  HEENT: Normal. No neck vein distention. Thyroid not enlarged.  No lymphadenopathy.  CV: S1 and S2 normal.  No murmurs or gallops.  Lungs: Clear to auscultation.  Abdomen: Soft and nondistended.  Mild epigastric tenderness.  No masses.  Bowel sounds are present.  Back: No CVA tenderness.  Extremities: No edema, cyanosis, or clubbing.  Skin: Warm and dry.  Rectal: deferred  Laboratory Data:  Lab Results   Component Value Date    CREATSERUM 9.98 05/07/2025    BUN 33 05/07/2025     05/07/2025    K 4.2 05/07/2025     05/07/2025    CO2 18.0 05/07/2025     05/07/2025    CA 8.5 05/07/2025    ALB 3.8 05/07/2025    PHOS 6.6 05/07/2025    PGLU 122 05/07/2025         Assessment:     Nausea and vomiting  Abdominal pain   Pt noted to have GB sludge with 7 mm cbd.  MRCP ordered for further evaluation.  Abdominal pain may represent biliary colic.  Will evaluate for PUD, GERD, gastritis.    Plan:  1. Agree with MRCP.  2. EGD with MAC tomorrow.  3. Continue pantoprazole 40 mg daily.        Thank you for allowing to participate in the care of this patient.     Ousmane Suarez MD  5/7/2025  6:36 PM          [1]   Past Medical History:   Anxiety state    Arrhythmia    Asthma (Former[facility])    Attention deficit hyperactivity disorder (ADHD)    Back problem    Bipolar 1 disorder ([facility])    CKD (chronic kidney disease) stage 3, GFR 30-59 ml/min (Formerly [facility])    Dr Meeks    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[facility]ly [facility])    Congestive heart disease (HCC)    COPD (chronic obstructive pulmonary disease) (Formerly [facility])    Coronary atherosclerosis    Deep vein thrombosis ([facility]ly [facility])    at age 17 R/T cast    Depression    Diabetes (Formerly [facility])    Dialysis patient    Diverticulosis of large intestine    Essential hypertension    3/21 echo: severe concentric LVH with normal EF and no MR or pHTN    Extrinsic asthma, unspecified    Heart attack ([facility])    2016- angiogram- no intervention    Heart valve disease    mitral valve repair in 1994/    High blood pressure    High cholesterol    History of blood transfusion    History of mitral valve repair    Hyperlipidemia    Low back pain    tight and stiff after sweeping and mopping    LVH (left ventricular hypertrophy)    Migraines    Mixed hyperlipidemia     HDL 38 LDL 97 VLDL 57     Monoclonal gammopathy    IgG kappa     Muscle weakness    MVP (mitral valve prolapse)    Repair 1994 at [place]; echoes as recently as 3/21 show mild or trivial MR and no stenosis    Neuropathy    Osteoarthritis    hip ,knees    Pneumonia due to organism    Pulmonary embolism ([facility])    Renal disorder    Stroke (Formerly [facility])    TIA (transient ischemic attack)    Initial history of left-sided weakness and slurred speech. (+) cocaine. MRI of the brain, CT angiogram of the head and neck, and 2D echo are all unremarkable.     TMJ (dislocation of temporomandibular joint)    Troponin level elevated    Trop 60 60 47 with TIA and no CP: Lexiscan negative with EF 51    Visual impairment   [2]   Past Surgical History:  Procedure Laterality Date    Av fistula revision, open Left     Cabg      Colonoscopy N/A 03/26/2023     Procedure: COLONOSCOPY;  Surgeon: Heath Vu MD;  Location:  ENDOSCOPY    Colonoscopy N/A 12/30/2023    Procedure: COLONOSCOPY with cold snare polypectomy and forcep polypectomy;  Surgeon: Ousmane Suarez MD;  Location:  ENDOSCOPY    Colonoscopy N/A 3/9/2025    Procedure: COLONOSCOPY WITH COLD SNARE POLYPECTOMY AND ENDO CLIPS X 2;  Surgeon: Bakari Noguera MD;  Location:  ENDOSCOPY    Colonoscopy & polypectomy  2019    Egd  2019    Duodenitis. Biopsied. EUS for weight loss was negative    Heart surgery      Hernia surgery  08/17/2022    Dr Barnes    Laminectomy,>2 sgmt,lumbar  09/06/2018    L4-L5 Decomp Discectomy ROEM L4-L5    Mitralplasty w cp bypass  1994    [place]: Repair    Repair rotator cuff,chronic Left     torn and had a ruptured bicep    Sinus surgery        Spine surgery procedure unlisted      Valve repair  1994    mitral valve   [3]   Family History  Problem Relation Age of Onset    Hypertension Father     Alcohol and Other Disorders Associated Father     Substance Abuse Father         cocaine    Dementia Father     Cancer Father         lung    Diabetes Mother     Cancer Mother         multiple myeloma    Hypertension Mother     Anxiety Maternal Aunt     Depression Maternal Aunt     Anxiety Maternal Aunt     Depression Maternal Aunt     Bipolar Disorder Maternal Aunt     Diabetes Maternal Grandmother     Hypertension Maternal Grandmother     Cancer Maternal Grandfather         stomach cancer    Diabetes Maternal Grandfather     Hypertension Maternal Grandfather     Alcohol and Other Disorders Associated Maternal Grandfather     Hypertension Paternal Grandmother     Hypertension Paternal Grandfather     Cancer Sister         uterine and ovarian    Hypertension Sister     Cancer Maternal Uncle         lung    Cancer Paternal Aunt         throat   [4]   Allergies  Allergen Reactions    Hydrochlorothiazide RASH and HIVES   [5]   Current Facility-Administered Medications:     heparin (Porcine)  1000 UNIT/ML injection 4,000 Units, 4,000 Units, Intracatheter, PRN Dialysis    sodium chloride 0.9 % IV bolus 100 mL, 100 mL, Intravenous, Q30 Min PRN **AND** albumin human (Albumin) 25% injection 25 g, 25 g, Intravenous, PRN Dialysis    cloNIDine (Catapres) tab 0.1 mg, 0.1 mg, Oral, BID    albuterol (Ventolin HFA) 108 (90 Base) MCG/ACT inhaler 2 puff, 2 puff, Inhalation, Q6H PRN    ARIPiprazole (Abilify) tab 15 mg, 15 mg, Oral, Daily    atorvastatin (Lipitor) tab 20 mg, 20 mg, Oral, QPM    buPROPion ER (Wellbutrin XL) 24 hr tab 150 mg, 150 mg, Oral, Daily    calcium acetate (Phoslo) cap 667 mg, 667 mg, Oral, TID with meals    carvedilol (Coreg) tab 25 mg, 25 mg, Oral, BID with meals    FLUoxetine (PROzac) cap 40 mg, 40 mg, Oral, Daily    gabapentin (Neurontin) cap 200 mg, 200 mg, Oral, TID    hydrALAZINE (Apresoline) tab 25 mg, 25 mg, Oral, TID    HYDROcodone-acetaminophen (Norco) 5-325 MG per tab 1 tablet, 1 tablet, Oral, Q6H PRN    lamoTRIgine (LaMICtal) tab 100 mg, 100 mg, Oral, Daily    losartan (Cozaar) tab 100 mg, 100 mg, Oral, Daily    NIFEdipine ER (Procardia-XL) 24 hr tab 30 mg, 30 mg, Oral, Daily    pantoprazole (Protonix) DR tab 40 mg, 40 mg, Oral, QAM AC    sevelamer carbonate (Renvela) tab 2,400 mg, 2,400 mg, Oral, TID CC    tamsulosin (Flomax) cap 0.4 mg, 0.4 mg, Oral, Daily    umeclidinium bromide (Incruse Ellipta) 62.5 MCG/ACT inhaler 1 puff, 1 puff, Inhalation, Daily    heparin (Porcine) 5000 UNIT/ML injection 5,000 Units, 5,000 Units, Subcutaneous, Q8H MARTHA    acetaminophen (Tylenol Extra Strength) tab 500 mg, 500 mg, Oral, Q6H PRN    HYDROmorphone (Dilaudid) 1 MG/ML injection 0.2 mg, 0.2 mg, Intravenous, Q2H PRN **OR** HYDROmorphone (Dilaudid) 1 MG/ML injection 0.4 mg, 0.4 mg, Intravenous, Q2H PRN **OR** HYDROmorphone (Dilaudid) 1 MG/ML injection 0.8 mg, 0.8 mg, Intravenous, Q2H PRN    glucose (Dex4) 15 GM/59ML oral liquid 15 g, 15 g, Oral, Q15 Min PRN **OR** glucose (Glutose) 40% oral gel 15 g,  15 g, Oral, Q15 Min PRN **OR** glucose-vitamin C (Dex-4) chewable tab 4 tablet, 4 tablet, Oral, Q15 Min PRN **OR** dextrose 50% injection 50 mL, 50 mL, Intravenous, Q15 Min PRN **OR** glucose (Dex4) 15 GM/59ML oral liquid 30 g, 30 g, Oral, Q15 Min PRN **OR** glucose (Glutose) 40% oral gel 30 g, 30 g, Oral, Q15 Min PRN **OR** glucose-vitamin C (Dex-4) chewable tab 8 tablet, 8 tablet, Oral, Q15 Min PRN    insulin aspart (NovoLOG) 100 Units/mL FlexPen 1-5 Units, 1-5 Units, Subcutaneous, TID AC and HS

## 2025-05-07 NOTE — PROGRESS NOTES
[facility]   part of [facility]    Nephrology Progress Note    Godwin Fonseca Attending:  Yuriy Forrest MD       SUBJECTIVE:     States he still has some abd pain and nausea but was able to eat crackers and ice cream and keep it down.  Seen on HD and tolerating well.  He still has not completed AV duplex.    PHYSICAL EXAM:     Vital Signs: BP (!) 165/109 (BP Location: Left arm)   Pulse 77   Temp 98 °F (36.7 °C) (Oral)   Resp 18   Wt 237 lb 7 oz (107.7 kg)   SpO2 99%   BMI 30.48 kg/m²   Temp (24hrs), Av.9 °F (36.6 °C), Min:97.8 °F (36.6 °C), Max:98 °F (36.7 °C)       Intake/Output Summary (Last 24 hours) at 2025 1304  Last data filed at 2025 0839  Gross per 24 hour   Intake 250 ml   Output 750 ml   Net -500 ml     Wt Readings from Last 3 Encounters:   25 237 lb 7 oz (107.7 kg)   25 237 lb 6.4 oz (107.7 kg)   25 229 lb 14.4 oz (104.3 kg)     Gen - laying in bed, nad  HEENT - NCAT  CV - RRR  Resp - CTAB  Abd - soft   - no lobo  Ext - warm no leg swelling  Vascular - LIJ tunnelled HD catheter, LUE AVF + thrill and bruit with distal small aneurysmal dilation       LABORATORY DATA:     Lab Results   Component Value Date     (H) 2025    BUN 33 (H) 2025    BUNCREA 13.0 2022    CREATSERUM 9.98 (H) 2025    ANIONGAP 15 2025    GFR 59 (L) 2018    GFRNAA 30 (L) 2022    GFRAA 35 (L) 2022    CA 8.5 (L) 2025    OSMOCALC 283 2025    ALKPHO 130 (H) 2025    AST 26 2025    ALT 17 2025    BILT 0.5 2025    TP 7.5 2025    ALB 3.8 2025    GLOBULIN 3.2 2025     (L) 2025    K 4.2 2025     2025    CO2 18.0 (L) 2025     Lab Results   Component Value Date    WBC 5.9 2025    RBC 2.69 (L) 2025    HGB 8.6 (L) 2025    HCT 24.7 (L) 2025    .0 2025    MPV 11.5 2012    MCV 91.8 2025    MCH 32.0 2025     MCHC 34.8 05/06/2025    RDW 12.6 05/06/2025    NEPRELIM 5.14 05/05/2025    NEPERCENT 69.3 05/05/2025    LYPERCENT 15.7 05/05/2025    MOPERCENT 10.0 05/05/2025    EOPERCENT 3.5 05/05/2025    BAPERCENT 1.2 05/05/2025    NE 5.14 05/05/2025    LYMABS 1.16 05/05/2025    MOABSO 0.74 05/05/2025    EOABSO 0.26 05/05/2025    BAABSO 0.09 05/05/2025     Lab Results   Component Value Date    MALBP 167.00 05/24/2023    CREUR 142.00 05/24/2023    CREUR 142.00 05/24/2023     Lab Results   Component Value Date    COLORUR Light-Yellow 03/30/2025    CLARITY Clear 03/30/2025    SPECGRAVITY 1.028 03/30/2025    GLUUR Normal 03/30/2025    BILUR Negative 03/30/2025    KETUR Negative 03/30/2025    BLOODURINE 2+ (A) 03/30/2025    PHURINE 6.5 03/30/2025    PROUR 100 (A) 03/30/2025    UROBILINOGEN Normal 03/30/2025    NITRITE Negative 03/30/2025    LEUUR Negative 03/30/2025    WBCUR 1-5 03/30/2025    RBCUR 0-2 03/30/2025    EPIUR Few (A) 03/30/2025    BACUR None Seen 03/30/2025    CAOXUR Occasional (A) 04/20/2018    HYLUR Present (A) 05/14/2019         IMAGING:     Reviewed.    MEDICATIONS:       Current Hospital Medications[1]    ASSESSMENT/PLAN:   46 yo M with history of ESRD on iHD MWF via LUE AVF, HTN, severe MR s/p MVR x 2, HFpEF, DVT/PE, TIA, MGUS, bipolar disorder, recurrent GI bleed, diffuse body pains presents with n/v/abdominal pain.     ESRD:  -- HD MWF per outpatient schedule  -- avoid morphine  -- gabapentin should be renally dosed     Anemia:  -- epo with HD today     MBD:  -- trend phos  -- sevelamer 2400 TID AC  -- calcium acetate 667 TIDAC  -- low phos diet     HTN:  -- clonidine should be continued BID to avoid rebound  -- takes remaining antihypertensives after HD but ok to hold hydralazine if there is a concern for dropping too much BP after HD today; hold pre HD for now since he has dropped BP with UF in the past here     LUE AVF with arm swelling:  -- L arm precautions  -- US duplex dialysis access  -- consider  vascular surgery evaluation      D/w RN. Will follow.        Thank you for allowing me to participate in the care of this patient. Please do not hesitate to call with questions or concerns.        MD Kevin Villafana Nephrology         [1]   Current Facility-Administered Medications   Medication Dose Route Frequency    heparin (Porcine) 1000 UNIT/ML injection 4,000 Units  4,000 Units Intracatheter PRN Dialysis    epoetin sylvia (Epogen, Procrit) 84752 UNIT/ML injection 10,000 Units  10,000 Units Intravenous Once    sodium chloride 0.9 % IV bolus 100 mL  100 mL Intravenous Q30 Min PRN    And    albumin human (Albumin) 25% injection 25 g  25 g Intravenous PRN Dialysis    cloNIDine (Catapres) tab 0.1 mg  0.1 mg Oral BID    albuterol (Ventolin HFA) 108 (90 Base) MCG/ACT inhaler 2 puff  2 puff Inhalation Q6H PRN    ARIPiprazole (Abilify) tab 15 mg  15 mg Oral Daily    atorvastatin (Lipitor) tab 20 mg  20 mg Oral QPM    buPROPion ER (Wellbutrin XL) 24 hr tab 150 mg  150 mg Oral Daily    calcium acetate (Phoslo) cap 667 mg  667 mg Oral TID with meals    carvedilol (Coreg) tab 25 mg  25 mg Oral BID with meals    FLUoxetine (PROzac) cap 40 mg  40 mg Oral Daily    gabapentin (Neurontin) cap 200 mg  200 mg Oral TID    hydrALAZINE (Apresoline) tab 25 mg  25 mg Oral TID    HYDROcodone-acetaminophen (Norco) 5-325 MG per tab 1 tablet  1 tablet Oral Q6H PRN    lamoTRIgine (LaMICtal) tab 100 mg  100 mg Oral Daily    losartan (Cozaar) tab 100 mg  100 mg Oral Daily    NIFEdipine ER (Procardia-XL) 24 hr tab 30 mg  30 mg Oral Daily    pantoprazole (Protonix) DR tab 40 mg  40 mg Oral QAM AC    sevelamer carbonate (Renvela) tab 2,400 mg  2,400 mg Oral TID CC    tamsulosin (Flomax) cap 0.4 mg  0.4 mg Oral Daily    umeclidinium bromide (Incruse Ellipta) 62.5 MCG/ACT inhaler 1 puff  1 puff Inhalation Daily    heparin (Porcine) 5000 UNIT/ML injection 5,000 Units  5,000 Units Subcutaneous Q8H MARTHA    acetaminophen (Tylenol Extra Strength) tab  500 mg  500 mg Oral Q6H PRN    HYDROmorphone (Dilaudid) 1 MG/ML injection 0.2 mg  0.2 mg Intravenous Q2H PRN    Or    HYDROmorphone (Dilaudid) 1 MG/ML injection 0.4 mg  0.4 mg Intravenous Q2H PRN    Or    HYDROmorphone (Dilaudid) 1 MG/ML injection 0.8 mg  0.8 mg Intravenous Q2H PRN    glucose (Dex4) 15 GM/59ML oral liquid 15 g  15 g Oral Q15 Min PRN    Or    glucose (Glutose) 40% oral gel 15 g  15 g Oral Q15 Min PRN    Or    glucose-vitamin C (Dex-4) chewable tab 4 tablet  4 tablet Oral Q15 Min PRN    Or    dextrose 50% injection 50 mL  50 mL Intravenous Q15 Min PRN    Or    glucose (Dex4) 15 GM/59ML oral liquid 30 g  30 g Oral Q15 Min PRN    Or    glucose (Glutose) 40% oral gel 30 g  30 g Oral Q15 Min PRN    Or    glucose-vitamin C (Dex-4) chewable tab 8 tablet  8 tablet Oral Q15 Min PRN    insulin aspart (NovoLOG) 100 Units/mL FlexPen 1-5 Units  1-5 Units Subcutaneous TID AC and HS

## 2025-05-07 NOTE — PLAN OF CARE
Alert x 4. VSS on RA. Medicated for pain. Tolerating diet. Awaiting US of L arm. Dialysis this morning. POC discussed. All current needs met. Call light in reach .

## 2025-05-07 NOTE — PAYOR COMM NOTE
--------------  ADMISSION REVIEW     Payor: [employer]  Subscriber #:  [id number]  Authorization Number: [id number]    Admit date: 5/5/25  Admit time: 10:59 AM     REVIEW DOCUMENTATION:  ED Provider Notes signed by Micheal Ritter MD at 5/5/2025  8:51 AM       Author: Micheal Ritter MD Service: -- Author Type: Physician    Filed: 5/5/2025  8:51 AM Date of Service: 5/5/2025  6:28 AM Status: Signed     Patient Seen in: [facility] In [place]    History     Chief Complaint   Patient presents with    Abdomen/Flank Pain     Stated Complaint: c/o of left sided abdominal pain started yesterday, cant keep food down    HPI  45-year-old male presents reporting left-sided abdominal pain with intractable nausea, vomiting, diarrhea.  Symptoms began 1 day ago.  He is on hemodialysis.  He reports a history of a previous hernia repair.  Most recent dialysis was on Friday.  Due for dialysis this morning.  Last episode of vomiting and diarrhea was just before coming to the ER.  Reports chills and sweats.  No known sick contacts.    Physical Exam     ED Triage Vitals [05/05/25 0607]   BP (!) 190/114   Pulse 97   Resp 20   Temp 97.7 °F (36.5 °C)   Temp src Oral   SpO2 98 %   O2 Device None (Room air)     Vital Signs  BP: (!) 190/114  Pulse: 97  Resp: 20  Temp: 97.7 °F (36.5 °C)  Temp src: Oral    Oxygen Therapy  SpO2: 98 %  O2 Device: None (Room air)    Physical Exam  General:  Vitals as listed.  Appears uncomfortable  HEENT: Sclerae anicteric.  Dry mucous membranes  Neck: supple, no rigidity   Lungs: good air exchange and clear   Heart: regular rate rhythm.  Loud systolic murmur at the left sternal border  Abdomen: Generalized tenderness on palpation.  Normal bowel sounds.  No abdominal masses.  No peritoneal signs   Extremities: no edema, normal peripheral pulses   Neuro: Alert oriented and nonfocal     ED Course     Labs Reviewed   COMP METABOLIC PANEL (14) - Abnormal; Notable for the following  components:       Result Value    Glucose 203 (*)     BUN 66 (*)     Creatinine 12.21 (*)     Calcium, Total 8.2 (*)     Calculated Osmolality 309 (*)     eGFR-Cr 5 (*)     Alkaline Phosphatase 131 (*)     All other components within normal limits   CBC WITH DIFFERENTIAL WITH PLATELET - Abnormal; Notable for the following components:    RBC 2.79 (*)     HGB 9.0 (*)     HCT 26.0 (*)     All other components within normal limits   LIPASE - Abnormal; Notable for the following components:    Lipase 123 (*)     All other components within normal limits   URINALYSIS WITH CULTURE REFLEX     EKG    Rate, intervals and axes as noted on EKG Report.  Rate: 95  Rhythm: Sinus Rhythm  Reading: No ST elevation MI    MDM      45-year-old male on hemodialysis presents with generalized abdominal pain with nausea, vomiting, diarrhea.  Generalized tenderness on palpation.  No rebound or guarding.  Normal bowel sounds.    Additional history obtained by evaluation of extensive medical documentation showing history of recurrent abdominal pain.  Numerous previous CT scan showing nonspecific colitis/enteritis.    Differential includes but is not limited to dehydration, metabolic disturbance, gastroenteritis, a life/function threat.    CBC, CMP, urinalysis, lipase ordered for further evaluation.  1 L NS bolus.  Dilaudid 1 mg IV, Zofran 4 mg IV.    Laboratory evaluation shows chronic kidney disease.  Potassium within normal limits.  Lipase is mildly elevated.  He does not have significant epigastric pain to suggest acute pancreatitis.  At this time I am trying to avoid any imaging as he has had 7 CT scans in the last 2 months.  He has received 3 rounds of Dilaudid 1 mg IV at this point and is still reporting uncontrolled pain.  He has missed his dialysis today.  Will hospitalize for further management, possible inpatient dialysis, pain control.    Admission disposition: 5/5/2025  8:47 AM    Medical Decision Making  Disposition and Plan      Clinical Impression:  1. Intractable abdominal pain    2. ESRD (end stage renal disease) (HCC)    3. Nausea vomiting and diarrhea       Disposition:  Admit  5/5/2025  8:47 am    Micheal Ritter MD on 5/5/2025  8:51 AM      Nephrology     REASON FOR CONSULT:   ESRD     HISTORY OF PRESENT ILLNESS:      44 yo M with history of ESRD on iHD MWF via LUE AVF, HTN, severe MR s/p MVR x 2, HFpEF, DVT/PE, TIA, MGUS, recurrent GI bleed, diffuse body pains presents with n/v/abdominal pain/diarrhea x 1 day. Missed HD today. Nephrology consulted for ESRD management.  Notes that left arm is swollen around fistula. He had a CVC placed for dialysis in [facility] last week he reports.  No leg swelling.      BP (!) 192/126       ASSESSMENT/PLAN:      44 yo M with history of ESRD on iHD MWF via LUE AVF, HTN, presents with n/v/abdominal pain.     ESRD:  -- HD today 2-3L UF as tolerated  -- avoid morphine  -- gabapentin should be renally dosed - defer to RPh/primary service     Anemia:  -- epo with HD if BP lower; not today     MBD:  -- trend phos  -- sevelamer 2400 TID AC  -- calcium acetate 667 TIDAC  -- low phos diet     HTN:  -- clonidine should be continued BID to avoid rebound  -- takes remaining antihypertensives after HD; hold pre HD for now since he has dropped BP with UF in the past here     LUE AVF with arm swelling:  -- L arm precautions  -- US duplex dialysis access  -- consider vascular surgery evaluation       Hospitalist H&P/Consult note     Chief Complaint   Patient presents with    Abdomen/Flank Pain     History of Present Illness:   45 year old male with PMH sig for HTN, CHF, ESRD on HD, DM here for abd pain  Reports that since yesterday has had several episodes of emesis, non bloody, cannot keep things down. Also with watery diarrhea. Has also had chills / sweats. No fevers.   No cp, sob.   No sick contacts outside of hospice but has had mutlpel hospitalizations       Lab 04/29/25  0606 05/05/25  0627   WBC 5.1 7.4   HGB  9.1* 9.0*   MCV 90.8 93.2   .0 236.0      Lab 04/29/25  0606 04/30/25  0559 05/05/25  0627    139 137   K 4.1 4.1 4.3    103 100   CO2 26.0 24.0 22.0   BUN 45* 57* 66*   CREATSERUM 8.41* 10.62* 12.21*   * 136* 203*   CA 9.4 9.6 8.2*   MG 2.2 2.3  --    PHOS 5.9* 6.4*  --       Lab 04/29/25  0606 04/30/25  0559 05/05/25  0627   ALT  --   --  28   AST  --   --  32   ALB 4.4 4.3 4.1       ASSESSMENT / PLAN:      Patient is a 45 year old male with PMH sig for HTN, CHF, ESRD on HD, DM here for abd pain     Impression  -N/V/D  -abd pain  -elevated lipase      -ESRD on HD   -secondary hyperparathyroidism     -chronic HFpEF  -HTN, uncontrolled  -hx of MV repair     -DM 2     -MDD    Plan  *GI  -appears viral GE like symptoms  -check stool cx  -has upper abd pain - will check RUQ to eval GB. Mild lipase elevation but doubt pancreatitis - has had elevation previously  -if no improvement may need repeat CT     *CV  -on multiple bp meds at home, dw renal, BP tends to drop with dialysis, will hold meds prior to dialysis. Emanuel RN as well     *Endo  -iss     *renal  -HD today, emanuel Bond  -getting US of LUE fistula. May need vascular eval too     Scds  Heparin     Further recommendations pending patient's clinical course    5/6/2025  Nephrology Progress Note   underwent HD yesterday - reports no issues with the treatment. UF not documented in chart but he thinks 3L UF. No cramping, sob, leg swelling. He feels his left arm is a little sore.     5/6/2025 0530      Gross per 24 hour   Intake 720 ml   Output 0 ml   Net 720 ml       ASSESSMENT/PLAN:   ESRD:  -- HD MWF per outpatient schedule  -- avoid morphine  -- gabapentin should be renally dosed     Anemia:  -- epo with HD tomorrow     MBD:  -- trend phos  -- sevelamer 2400 TID AC  -- calcium acetate 667 TIDAC  -- low phos diet     HTN:  -- clonidine should be continued BID to avoid rebound  -- takes remaining antihypertensives after HD; hold pre HD for now  since he has dropped BP with UF in the past here     LUE AVF with arm swelling:  -- L arm precautions  -- US duplex dialysis access  -- consider vascular surgery evaluation     Hospitalist   follow-up hospital admission abd pain   Feels pain is better  No diarrhea  No emesis  Would like to eat    Lab 05/05/25  0627 05/06/25  0540   WBC 7.4 5.9   HGB 9.0* 8.6*   MCV 93.2 91.8   .0 209.0      Lab 04/30/25  0559 05/05/25  0627 05/06/25  0540    137 138  138   K 4.1 4.3 4.1  4.1    100 101  101   CO2 24.0 22.0 25.0  25.0   BUN 57* 66* 36*  36*   CREATSERUM 10.62* 12.21* 8.84*  8.84*   CA 9.6 8.2* 8.7  8.7   MG 2.3  --  1.8   PHOS 6.4*  --  5.5*   * 203* 93  93      ALT  --  28 17   AST  --  32 26   ALB 4.3 4.1 4.3  4.3     Lab 05/05/25  1541 05/05/25  2148 05/06/25  0522   PGLU 98 148* 96     ASSESSMENT/PLAN:  Impression  -N/V/D  -abd pain  -elevated lipase      -ESRD on HD   -secondary hyperparathyroidism     -chronic HFpEF  -HTN, uncontrolled  -hx of MV repair     -DM 2     -MDD      Plan  *GI  -appears viral GE like symptoms  -check stool cx - no stools  -has upper abd pain - will check RUQ to eval GB. Mild lipase elevation but doubt pancreatitis - has had elevation previously - GB with sludge, borderline CBD  -will start on diet. If any worsening pain can check MRCP     *CV  -on multiple bp meds at home, cont      *Endo  -iss     *renal  -HD MWF  dw Dr oBnd  -getting US of LUE fistula.  Emanuel Coombs, was supposed to see around this time -rec outpt fu     Scds  Heparin     Will continue to follow while hospitalized    5/7/2025  Nephrology Progress Note   still has some abd pain and nausea but was able to eat crackers and ice cream and keep it down.   HD and tolerating well.  He still has not completed AV duplex.    5/7/2025 0839      Gross per 24 hour   Intake 250 ml   Output 750 ml   Net -500 ml     ASSESSMENT/PLAN:   ESRD:  -- HD MWF per outpatient schedule  -- avoid morphine  --  gabapentin should be renally dosed     Anemia:  -- epo with HD today     MBD:  -- trend phos  -- sevelamer 2400 TID AC  -- calcium acetate 667 TIDAC  -- low phos diet     HTN:  -- clonidine should be continued BID to avoid rebound  -- takes remaining antihypertensives after HD but ok to hold hydralazine if there is a concern for dropping too much BP after HD today; hold pre HD for now since he has dropped BP with UF in the past here     LUE AVF with arm swelling:  -- L arm precautions  -- US duplex dialysis access  -- consider vascular surgery evaluation     MEDICATIONS ADMINISTERED:  Medications 05/05/25 05/06/25 05/07/25   atorvastatin (Lipitor) tab 20 mg  Dose: 20 mg  Freq: Every evening Route: OR  Start: 05/05/25 1800    2100 LQ-Given        1700 DV-Given        1800          calcium acetate (Phoslo) cap 667 mg  Dose: 667 mg  Freq: 3 times daily with meals Route: OR  Start: 05/05/25 1215    (1215 DL)-Not Given   (1800 DL)-Not Given       0912 DV-Given   1306 DV-Given   1700 DV-Given      (0800 DL)-Not Given [C]   1224 DL-Given   1800        carvedilol (Coreg) tab 25 mg  Dose: 25 mg  Freq: 2 times daily with meals Route: OR  Start: 05/05/25 1800   Admin Instructions:   Hold pre HD    1756 DL-Given        0910 DV-Given   1700 DV-Given       1231 DL-Given   1800         cloNIDine (Catapres) tab 0.1 mg  Dose: 0.1 mg  Freq: 2 times daily Route: OR  Start: 05/05/25 1145    1545 DL-Given   (2100 LQ)-Not Given       0911 DV-Given   (2100 LQ)-Not Given       (1231 DL)-Not Given [C]   2100         epoetin sylvia (Epogen, Procrit) 49895 UNIT/ML injection 10,000 Units  Dose: 10,000 Units  Freq: Once Route: IV  Start: 05/07/25 1300 End: 05/07/25 1411   Admin Instructions:   With HD  Keep refrigerated   Order specific questions:         1411 DL-Given          gabapentin (Neurontin) cap 200 mg  Dose: 200 mg  Freq: 3 times daily Route: OR  Start: 05/05/25 1600    1548 DL-Given   2252 LQ-Given       0909 DV-Given   1700 DV-Given    2127 LQ-Given      1230 DL-Given   1600   2100        heparin (Porcine) 5000 UNIT/ML injection 5,000 Units  Dose: 5,000 Units  Freq: Every 8 hours scheduled Route: SC  Start: 05/05/25 1400    1500 DL-Given   2252 LQ-Given       0540 LQ-Given   1453 DV-Given   2127 LQ-Given      0510 LQ-Given   1400   2200        hydrALAZINE (Apresoline) tab 25 mg  Dose: 25 mg  Freq: 3 times daily Route: OR  Indications of Use: HYPERTENSION  Start: 05/05/25 1600   Admin Instructions:   Hold pre HD    1548 DL-Given   (2100 LQ)-Not Given       0910 DV-Given   1700 DV-Given   (2100 LQ)-Not Given      (1253 DL)-Not Given [C]   1600   2100        lamoTRIgine (LaMICtal) tab 100 mg  Dose: 100 mg  Freq: Daily Route: OR  Start: 05/06/25 0930     0912 DV-Given        1222 DL-Given [C]          losartan (Cozaar) tab 100 mg  Dose: 100 mg  Freq: Daily Route: OR  Start: 05/06/25 0930   Admin Instructions:   Hold pre HD     0911 DV-Given        1221 DL-Given [C]          NIFEdipine ER (Procardia-XL) 24 hr tab 30 mg  Dose: 30 mg  Freq: Daily Route: OR  Start: 05/06/25 0930   Admin Instructions:   Hold pre HD  Do not crush     0911 DV-Given        1222 DL-Given [C]          ondansetron (Zofran) 4 MG/2ML injection 4 mg  Dose: 4 mg  Freq: Once Route: IV  Start: 05/05/25 0629 End: 05/05/25 0638    0638 CW-Given            pantoprazole (Protonix) DR tab 40 mg  Dose: 40 mg  Freq: Every morning before breakfast Route: OR  Start: 05/06/25 0700   Admin Instructions:   Do not crush     (0700 LQ)-Not Given        0510 LQ-Given          sevelamer carbonate (Renvela) tab 2,400 mg  Dose: 2,400 mg  Freq: 3 times daily with meals Route: OR  Start: 05/05/25 1215    (1215 DL)-Not Given   (1700 DL)-Not Given       0912 DV-Given   1306 DV-Given   1700 DV-Given      (0800 DL)-Not Given [C]   1223 DL-Given   1700        sodium chloride 0.9 % IV bolus 1,000 mL  Dose: 1,000 mL  Freq: Once Route: IV  Last Dose: 1,000 mL (05/05/25 0637)  Start: 05/05/25 0629 End: 05/05/25  0737    0637 CW-New Bag   1010 EDIN-Handoff           tamsulosin (Flomax) cap 0.4 mg  Dose: 0.4 mg  Freq: Daily Route: OR  Start: 05/05/25 1215    1557 DL-Given        0911 DV-Given        1221 DL-Given [C]          umeclidinium bromide (Incruse Ellipta) 62.5 MCG/ACT inhaler 1 puff  Dose: 1 puff  Freq: Daily Route: IN  Start: 05/05/25 1215   Admin Instructions:   Do not shake; administer at the same time every day; do not use more than 1 inhalation in 24 hours; only open inhaler cover when ready for administration (opening and closing the cover without inhaling the medicine will cause a dose to be lost).  RT To Administer First Dose.    1324 AD-Given        0909 DV-Given        1238 DL-Given            heparin (Porcine) 1000 UNIT/ML injection 4,000 Units  Dose: 4,000 Units  Freq: As needed with Dialysis Route: IF  PRN Reason: Line care  Start: 05/07/25 1016   Admin Instructions:   2000 units for each lumen.      1036          HYDROcodone-acetaminophen (Norco) 5-325 MG per tab 1 tablet  Dose: 1 tablet  Freq: Every 6 hours PRN Route: OR  PRN Reason: moderate pain  Start: 05/05/25 1158      1011 DL-Given          Or   HYDROmorphone (Dilaudid) 1 MG/ML injection 0.4 mg  Dose: 0.4 mg  Freq: Every 2 hour PRN Route: IV  PRN Reason: moderate pain  Start: 05/05/25 1200   Admin Instructions:   Use PRN reason as a guide and follow range order policy. If oral pain meds are ordered and patient can tolerate oral intake, start with PRN oral pain medications first.       1558 DL-Given            0909 DV-Given   1457 DV-Given     2002 LQ-Given        0134 LQ-Given   0645 LQ-Given   1421 DL-Given        Or   HYDROmorphone (Dilaudid) 1 MG/ML injection 0.8 mg  Dose: 0.8 mg  Freq: Every 2 hour PRN Route: IV  PRN Reason: severe pain  Start: 05/05/25 1200   Admin Instructions:   Use PRN reason as a guide and follow range order policy. If oral pain meds are ordered and patient can tolerate oral intake, start with PRN oral pain medications  first.    1232 DL-Given      2319 LQ-Given      0526 AG-Given                                 HYDROmorphone (Dilaudid) 1 MG/ML injection 1 mg  Dose: 1 mg  Freq: Every 30 min PRN Route: IV  PRN Reason: severe pain  Start: 05/05/25 0628 End: 05/05/25 1010    0640 CW-Given   0723 EDIN-Given   0859 EDIN-Given     1010 EDIN-Given              Vitals       Date/Time Temp Pulse Resp BP SpO2 Weight O2 Device O2 Flow Rate (L/min) Who    05/07/25 0839 98 °F (36.7 °C) 77 18 165/109 99 % -- None (Room air) -- AK    05/07/25 0440 97.8 °F (36.6 °C) 78 18 135/80 95 % -- None (Room air) -- ANISA    05/06/25 2027 98 °F (36.7 °C) 80 18 117/76 98 % -- None (Room air) -- ANISA    05/06/25 1619 97.9 °F (36.6 °C) 76 18 98/65 92 % -- None (Room air) -- NC    05/06/25 1227 98 °F (36.7 °C) 76 18 134/79 100 % -- None (Room air) -- NC    05/06/25 0803 -- 79 -- 147/87 -- -- -- -- GL    05/06/25 0803 98.7 °F (37.1 °C) -- 18 -- 97 % -- None (Room air) -- NC    05/06/25 0530 98 °F (36.7 °C) 81 19 152/113 92 % -- None (Room air) -- [place]    05/06/25 0100 97.4 °F (36.3 °C) 78 18 132/79 95 % -- None (Room air) -- [place]     05/05/25 2157 97.9 °F (36.6 °C) 81 18 119/90 97 % -- None (Room air) -- [place]   05/05/25 1618 97.8 °F (36.6 °C) 91 22 -- 94 % -- None (Room air) -- DV   05/05/25 1059 97.7 °F (36.5 °C) 93 22 192/126 Abnormal  92 % -- None (Room air) -- DV   05/05/25 1012 -- 86 16 186/122 Abnormal  97 % -- None (Room air) -- EDIN   05/05/25 0906 98 °F (36.7 °C) 84 16 182/100 Abnormal  -- -- -- -- EDIN   05/05/25 0902 98.1 °F (36.7 °C) 84 16 182/112 Abnormal  98 % -- None (Room air) -- EDIN   05/05/25 0622 -- -- -- -- -- -- None (Room air) -- CW   05/05/25 0607 97.7 °F (36.5 °C) 97 20 190/114 Abnormal  98 % 237 lb 7 oz (107.7 kg) None (Room air) -- CW       FOR REVIEW/APPROVAL OF INPT ADMISSION

## 2025-05-07 NOTE — PLAN OF CARE
Problem: RISK FOR INFECTION - ADULT  Goal: Absence of fever/infection during anticipated neutropenic period  Description: INTERVENTIONS- Monitor WBC- Administer growth factors as ordered- Implement neutropenic guidelines  Outcome: Progressing     Problem: SAFETY ADULT - FALL  Goal: Free from fall injury  Description: INTERVENTIONS:- Assess pt frequently for physical needs- Identify cognitive and physical deficits and behaviors that affect risk of falls.- Damon fall precautions as indicated by assessment.- Educate pt/family on patient safety including physical limitations- Instruct pt to call for assistance with activity based on assessment- Modify environment to reduce risk of injury- Provide assistive devices as appropriate- Consider OT/PT consult to assist with strengthening/mobility- Encourage toileting schedule  Outcome: Progressing     Problem: METABOLIC/FLUID AND ELECTROLYTES - ADULT  Goal: Hemodynamic stability and optimal renal function maintained  Description: INTERVENTIONS:- Monitor labs and assess for signs and symptoms of volume excess or deficit- Monitor intake, output and patient weight- Monitor urine specific gravity, serum osmolarity and serum sodium as indicated or ordered- Monitor response to interventions for patient's volume status, including labs, urine output, blood pressure (other measures as available)- Encourage oral intake as appropriate- Instruct patient on fluid and nutrition restrictions as appropriate  Outcome: Progressing   The patient is alert and oriented x 4, and his vital signs are stable. He is tolerating full liquids with no complaint of nausea. However, he still complains of 8 out of 10 abdominal pain. MRI of the Abdomen ordered by Dr. Forrest. GI Service consult also ordered by Dr. Forrest.

## 2025-05-07 NOTE — PROGRESS NOTES
CC: follow-up hospital admission abd pain    SUBJECTIVE:  Interval History:       Having abd pain. +n, no v. Reports burning. Lying in bed    OBJECTIVE:  Scheduled Meds: Scheduled Medications[1]  Continuous Infusions: Medication Infusions[2]  PRN Meds: PRN Medications[3]    PHYSICAL EXAM  Vital signs: Temp:  [97.8 °F (36.6 °C)-98 °F (36.7 °C)] 98 °F (36.7 °C)  Pulse:  [76-80] 77  Resp:  [18] 18  BP: ()/() 165/109  SpO2:  [92 %-99 %] 99 %      GENERAL - NAD, AAO  EYES- sclera anicteric   HENT- normocephalic,   NECK - no JVD  CV- RRR  RESP - CTAB, normal resp effort  ABDOMEN- soft, mild ttp in upper abd  EXT- no LE edema        Data Review:   Labs:   Recent Labs   Lab 05/05/25  0627 05/06/25  0540   WBC 7.4 5.9   HGB 9.0* 8.6*   MCV 93.2 91.8   .0 209.0       Recent Labs   Lab 05/05/25  0627 05/06/25  0540 05/07/25  0620    138  138 133*   K 4.3 4.1  4.1 4.2    101  101 100   CO2 22.0 25.0  25.0 18.0*   BUN 66* 36*  36* 33*   CREATSERUM 12.21* 8.84*  8.84* 9.98*   CA 8.2* 8.7  8.7 8.5*   MG  --  1.8  --    PHOS  --  5.5* 6.6*   * 93  93 101*       Recent Labs   Lab 05/05/25  0627 05/06/25  0540 05/07/25  0620   ALT 28 17  --    AST 32 26  --    ALB 4.1 4.3  4.3 3.8       Recent Labs   Lab 05/06/25  1519 05/06/25  1823 05/06/25  2106 05/07/25  0613 05/07/25  1222   PGLU 82 143* 144* 112* 101*           ASSESSMENT/PLAN:  Patient is a 45 year old male with PMH sig for HTN, CHF, ESRD on HD, DM, bipolar do, pavan, here for abd pain     Impression     -N/V/D  -abd pain  -elevated lipase      -ESRD on HD   -secondary hyperparathyroidism     -chronic HFpEF  -HTN, uncontrolled  -hx of MV repair     -DM 2     -MDD  -PAVAN  -Bipolar      Plan     *GI  -appears viral GE like symptoms  -check stool cx - no stools  -has upper abd pain - will check RUQ to eval GB. Mild lipase elevation but doubt pancreatitis - has had elevation previously - GB with sludge, borderline CBD  -will start on  diet when sxs imrpoving  - still c worsening pain check MRCP and c/s GI as has chronic sxs; d/w Dr. Bond to ensure ok c kidneys/HD afterwards        *CV  -on multiple bp meds at home, cont      *Endo  -iss     *renal  -HD MWF    -getting US of LUE fistula.  Dw Dr Coombs, was supposed to see around this time -rec outpt fu     Scds  Heparin      Will continue to follow while hospitalized. Please page me or the on-call hospitalist with questions or concerns. Reviewed chart including previous progress notes    Yuriy Forrest MD  [facility] Hospitalist  [phone]  5/7/2025  3:23 PM         [1]    cloNIDine  0.1 mg Oral BID    ARIPiprazole  15 mg Oral Daily    atorvastatin  20 mg Oral QPM    buPROPion ER  150 mg Oral Daily    calcium acetate  667 mg Oral TID with meals    carvedilol  25 mg Oral BID with meals    FLUoxetine HCl  40 mg Oral Daily    gabapentin  200 mg Oral TID    hydrALAZINE  25 mg Oral TID    lamoTRIgine  100 mg Oral Daily    losartan  100 mg Oral Daily    NIFEdipine ER  30 mg Oral Daily    pantoprazole  40 mg Oral QAM AC    sevelamer carbonate  2,400 mg Oral TID CC    tamsulosin  0.4 mg Oral Daily    umeclidinium bromide  1 puff Inhalation Daily    heparin  5,000 Units Subcutaneous Q8H MARTHA    insulin aspart  1-5 Units Subcutaneous TID AC and HS   [2] [3]   heparin    sodium chloride **AND** albumin human    albuterol    HYDROcodone-acetaminophen    acetaminophen    HYDROmorphone **OR** HYDROmorphone **OR** HYDROmorphone    glucose **OR** glucose **OR** glucose-vitamin C **OR** dextrose **OR** glucose **OR** glucose **OR** glucose-vitamin C

## 2025-05-08 ENCOUNTER — APPOINTMENT (OUTPATIENT)
Dept: MRI IMAGING | Facility: HOSPITAL | Age: 45
End: 2025-05-08
Attending: INTERNAL MEDICINE
Payer: MEDICARE

## 2025-05-08 ENCOUNTER — ANESTHESIA (OUTPATIENT)
Dept: ENDOSCOPY | Facility: HOSPITAL | Age: 45
End: 2025-05-08
Payer: MEDICARE

## 2025-05-08 ENCOUNTER — ANESTHESIA EVENT (OUTPATIENT)
Dept: ENDOSCOPY | Facility: HOSPITAL | Age: 45
End: 2025-05-08
Payer: MEDICARE

## 2025-05-08 LAB
ALBUMIN SERPL-MCNC: 3.9 G/DL (ref 3.2–4.8)
ANION GAP SERPL CALC-SCNC: 14 MMOL/L (ref 0–18)
BILIRUB UR QL STRIP.AUTO: NEGATIVE
BUN BLD-MCNC: 29 MG/DL (ref 9–23)
CALCIUM BLD-MCNC: 8.8 MG/DL (ref 8.7–10.6)
CHLORIDE SERPL-SCNC: 99 MMOL/L (ref 98–112)
CLARITY UR REFRACT.AUTO: CLEAR
CO2 SERPL-SCNC: 25 MMOL/L (ref 21–32)
CREAT BLD-MCNC: 8.05 MG/DL (ref 0.7–1.3)
EGFRCR SERPLBLD CKD-EPI 2021: 8 ML/MIN/1.73M2 (ref 60–?)
GLUCOSE BLD-MCNC: 142 MG/DL (ref 70–99)
GLUCOSE BLD-MCNC: 151 MG/DL (ref 70–99)
GLUCOSE BLD-MCNC: 162 MG/DL (ref 70–99)
GLUCOSE BLD-MCNC: 90 MG/DL (ref 70–99)
GLUCOSE BLD-MCNC: 91 MG/DL (ref 70–99)
GLUCOSE UR STRIP.AUTO-MCNC: NORMAL MG/DL
KETONES UR STRIP.AUTO-MCNC: NEGATIVE MG/DL
LEUKOCYTE ESTERASE UR QL STRIP.AUTO: NEGATIVE
NITRITE UR QL STRIP.AUTO: NEGATIVE
OSMOLALITY SERPL CALC.SUM OF ELEC: 291 MOSM/KG (ref 275–295)
PH UR STRIP.AUTO: 7 [PH] (ref 5–8)
PHOSPHATE SERPL-MCNC: 6.5 MG/DL (ref 2.4–5.1)
POTASSIUM SERPL-SCNC: 3.7 MMOL/L (ref 3.5–5.1)
PROT UR STRIP.AUTO-MCNC: 100 MG/DL
RBC UR QL AUTO: NEGATIVE
SODIUM SERPL-SCNC: 138 MMOL/L (ref 136–145)
SP GR UR STRIP.AUTO: 1.01 (ref 1–1.03)
UROBILINOGEN UR STRIP.AUTO-MCNC: NORMAL MG/DL

## 2025-05-08 PROCEDURE — 74181 MRI ABDOMEN W/O CONTRAST: CPT | Performed by: INTERNAL MEDICINE

## 2025-05-08 PROCEDURE — 88305 TISSUE EXAM BY PATHOLOGIST: CPT | Performed by: INTERNAL MEDICINE

## 2025-05-08 PROCEDURE — 76376 3D RENDER W/INTRP POSTPROCES: CPT | Performed by: INTERNAL MEDICINE

## 2025-05-08 PROCEDURE — 82962 GLUCOSE BLOOD TEST: CPT

## 2025-05-08 PROCEDURE — 0DB98ZX EXCISION OF DUODENUM, VIA NATURAL OR ARTIFICIAL OPENING ENDOSCOPIC, DIAGNOSTIC: ICD-10-PCS | Performed by: INTERNAL MEDICINE

## 2025-05-08 PROCEDURE — 0DB68ZX EXCISION OF STOMACH, VIA NATURAL OR ARTIFICIAL OPENING ENDOSCOPIC, DIAGNOSTIC: ICD-10-PCS | Performed by: INTERNAL MEDICINE

## 2025-05-08 PROCEDURE — 80069 RENAL FUNCTION PANEL: CPT | Performed by: INTERNAL MEDICINE

## 2025-05-08 PROCEDURE — 81001 URINALYSIS AUTO W/SCOPE: CPT | Performed by: INTERNAL MEDICINE

## 2025-05-08 RX ORDER — LIDOCAINE HYDROCHLORIDE 10 MG/ML
INJECTION, SOLUTION EPIDURAL; INFILTRATION; INTRACAUDAL; PERINEURAL AS NEEDED
Status: DISCONTINUED | OUTPATIENT
Start: 2025-05-08 | End: 2025-05-08 | Stop reason: SURG

## 2025-05-08 RX ORDER — DEXTROAMPHETAMINE SACCHARATE, AMPHETAMINE ASPARTATE, DEXTROAMPHETAMINE SULFATE AND AMPHETAMINE SULFATE 2.5; 2.5; 2.5; 2.5 MG/1; MG/1; MG/1; MG/1
10 TABLET ORAL
Refills: 0 | Status: DISCONTINUED | OUTPATIENT
Start: 2025-05-08 | End: 2025-05-09

## 2025-05-08 RX ORDER — HYDROMORPHONE HYDROCHLORIDE 1 MG/ML
0.2 INJECTION, SOLUTION INTRAMUSCULAR; INTRAVENOUS; SUBCUTANEOUS EVERY 5 MIN PRN
Refills: 0 | OUTPATIENT
Start: 2025-05-08 | End: 2025-05-08

## 2025-05-08 RX ORDER — NALOXONE HYDROCHLORIDE 0.4 MG/ML
0.08 INJECTION, SOLUTION INTRAMUSCULAR; INTRAVENOUS; SUBCUTANEOUS AS NEEDED
OUTPATIENT
Start: 2025-05-08 | End: 2025-05-08

## 2025-05-08 RX ORDER — ALBUMIN (HUMAN) 12.5 G/50ML
25 SOLUTION INTRAVENOUS
Status: DISCONTINUED | OUTPATIENT
Start: 2025-05-08 | End: 2025-05-09

## 2025-05-08 RX ORDER — ONDANSETRON 2 MG/ML
4 INJECTION INTRAMUSCULAR; INTRAVENOUS EVERY 6 HOURS PRN
OUTPATIENT
Start: 2025-05-08

## 2025-05-08 RX ORDER — SODIUM CHLORIDE, SODIUM LACTATE, POTASSIUM CHLORIDE, CALCIUM CHLORIDE 600; 310; 30; 20 MG/100ML; MG/100ML; MG/100ML; MG/100ML
INJECTION, SOLUTION INTRAVENOUS CONTINUOUS PRN
Status: DISCONTINUED | OUTPATIENT
Start: 2025-05-08 | End: 2025-05-08 | Stop reason: SURG

## 2025-05-08 RX ORDER — HYDROMORPHONE HYDROCHLORIDE 1 MG/ML
0.4 INJECTION, SOLUTION INTRAMUSCULAR; INTRAVENOUS; SUBCUTANEOUS EVERY 5 MIN PRN
Refills: 0 | OUTPATIENT
Start: 2025-05-08 | End: 2025-05-08

## 2025-05-08 RX ORDER — HYDROMORPHONE HYDROCHLORIDE 1 MG/ML
0.6 INJECTION, SOLUTION INTRAMUSCULAR; INTRAVENOUS; SUBCUTANEOUS EVERY 5 MIN PRN
Refills: 0 | OUTPATIENT
Start: 2025-05-08 | End: 2025-05-08

## 2025-05-08 RX ORDER — SODIUM CHLORIDE, SODIUM LACTATE, POTASSIUM CHLORIDE, CALCIUM CHLORIDE 600; 310; 30; 20 MG/100ML; MG/100ML; MG/100ML; MG/100ML
INJECTION, SOLUTION INTRAVENOUS CONTINUOUS
OUTPATIENT
Start: 2025-05-08

## 2025-05-08 RX ORDER — SUCRALFATE 1 G/1
1 TABLET ORAL
Status: DISCONTINUED | OUTPATIENT
Start: 2025-05-08 | End: 2025-05-09

## 2025-05-08 RX ORDER — PROCHLORPERAZINE EDISYLATE 5 MG/ML
5 INJECTION INTRAMUSCULAR; INTRAVENOUS EVERY 8 HOURS PRN
OUTPATIENT
Start: 2025-05-08

## 2025-05-08 RX ADMIN — SODIUM CHLORIDE, SODIUM LACTATE, POTASSIUM CHLORIDE, CALCIUM CHLORIDE: 600; 310; 30; 20 INJECTION, SOLUTION INTRAVENOUS at 10:30:00

## 2025-05-08 RX ADMIN — LIDOCAINE HYDROCHLORIDE 25 MG: 10 INJECTION, SOLUTION EPIDURAL; INFILTRATION; INTRACAUDAL; PERINEURAL at 10:33:00

## 2025-05-08 NOTE — OPERATIVE REPORT
PATIENT NAME: Godwin Fonseca  MRN: [id number]  DATE OF OPERATION:  5/8/25  REFERRING PHYSICIAN: Dr. Echevarria  Medications:  MAC  PREOPERATIVE DIAGNOSIS    Upper abdominal pain   POSTOPERATIVE DIAGNOSIS:  LA grade A esophagitis with scattered erosions at the GE junction.  3 cm hiatal hernia   Normal stomach.  Biopsies were done to assess for h pylori infection.   Normal duodenum.   Biopsies were done to assess for celiac disease.     PROCEDURE PERFORMED:                Esophagogastroduodenoscopy with biopsies   Endoscopist:  Ousmane Suarez MD     PROCEDURE AND FINDINGS: The patient was placed into the left lateral decubitus after informed consent was obtained.  All questions were answered.  An updated history and physical were performed and an ASA score of 3 was assigned. Informed consent was obtained prior to the procedure, with review of possible complications including bleeding, infection, and missed cancer.  MAC sedation was administered.        The [employer] video gastroscope was introduced into the mouth and passed into the esophogus after administration of MAC sedation.  The entire examined esophagus was remarkable for LA grade A esophagitis with scattered erosions at the GE junction.   There was also a 3 cm hiatal hernia.  The entire examined stomach,  including retroflexion view was remarkable for normal mucosa.  Biopsies were done to assess for h pylori infection.  The examined duodenum to the third portion was normal.  Biopsies were done to assess for celiac disease.   The gastroscope was removed from the patient.  The procedure was completed. The patient tolerated the procedure well.    RECOMMENDATIONS    General diet.  The patient will be notified with biopsy results in 2 - 4 working days.   Pantoprazole 40 mg daily.  Sucralfate 1 gm po tid.  MRCP pending.  Ousmane Suarez MD, Gastroenterologist  Cc: Dr. Echevarria

## 2025-05-08 NOTE — ANESTHESIA PREPROCEDURE EVALUATION
PRE-OP EVALUATION    Patient Name: Godwin Fonseca    Admit Diagnosis: ESRD (end stage renal disease) (HCC) [N18.6]  Nausea vomiting and diarrhea [R11.2, R19.7]  Intractable abdominal pain [R10.9]    Pre-op Diagnosis: Abdominal pain [R10.9]    ESOPHAGOGASTRODUODENOSCOPY (EGD)    Anesthesia Procedure: ESOPHAGOGASTRODUODENOSCOPY (EGD)    Surgeons and Role:     * Ousmane Suarez MD - Primary    Pre-op vitals reviewed.  Temp: 98.3 °F (36.8 °C)  Pulse: 79  Resp: 18  BP: 136/82  SpO2: 98 %  Body mass index is 30.48 kg/m².    Current medications reviewed.  Hospital Medications:  Current Medications[1]    Outpatient Medications:   Prescriptions Prior to Admission[2]    Allergies: Hydrochlorothiazide      Anesthesia Evaluation    Patient summary reviewed.    Anesthetic Complications           GI/Hepatic/Renal                                 Cardiovascular                (+) obesity  (+) hypertension     (+) CAD                (+) CHF                Endo/Other      (+) diabetes                            Pulmonary      (+) asthma  (+) COPD       (+) shortness of breath            Neuro/Psych      (+) depression    (+) CVA   (+) TIA    (+) neuromuscular disease                     Past Surgical History[3]  Social Hx on file[4]  History   Drug Use No     Available pre-op labs reviewed.  Lab Results   Component Value Date    WBC 5.9 05/06/2025    RBC 2.69 (L) 05/06/2025    HGB 8.6 (L) 05/06/2025    HCT 24.7 (L) 05/06/2025    MCV 91.8 05/06/2025    MCH 32.0 05/06/2025    MCHC 34.8 05/06/2025    RDW 12.6 05/06/2025    .0 05/06/2025     Lab Results   Component Value Date     05/08/2025    K 3.7 05/08/2025    CL 99 05/08/2025    CO2 25.0 05/08/2025    BUN 29 (H) 05/08/2025    CREATSERUM 8.05 (H) 05/08/2025    GLU 91 05/08/2025    CA 8.8 05/08/2025            Airway      Mallampati: II  Mouth opening: 3 FB  TM distance: 4 - 6 cm  Neck ROM: limited Cardiovascular    Cardiovascular exam normal.  Rhythm: regular  Rate:  normal     Dental             Pulmonary    Pulmonary exam normal.                 Other findings              ASA: 3   Plan: MAC  NPO status verified and patient meets guidelines.          Plan/risks discussed with: patient                Present on Admission:  **None**             [1]    amphetamine-dextroamphetamine (Adderall) tab 10 mg  10 mg Oral BID AC    heparin (Porcine) 1000 UNIT/ML injection 4,000 Units  4,000 Units Intracatheter PRN Dialysis    [COMPLETED] epoetin sylvia (Epogen, Procrit) 92291 UNIT/ML injection 10,000 Units  10,000 Units Intravenous Once    sodium chloride 0.9 % IV bolus 100 mL  100 mL Intravenous Q30 Min PRN    And    albumin human (Albumin) 25% injection 25 g  25 g Intravenous PRN Dialysis    [COMPLETED] HYDROmorphone (Dilaudid) 1 MG/ML injection 1 mg  1 mg Intravenous Q30 Min PRN    [COMPLETED] sodium chloride 0.9 % IV bolus 1,000 mL  1,000 mL Intravenous Once    [COMPLETED] ondansetron (Zofran) 4 MG/2ML injection 4 mg  4 mg Intravenous Once    [] sodium chloride 0.9% infusion   Intravenous Continuous    [] ondansetron (Zofran) 4 MG/2ML injection 4 mg  4 mg Intravenous Q4H PRN    [] HYDROmorphone (Dilaudid) 1 MG/ML injection 0.5 mg  0.5 mg Intravenous Q30 Min PRN    [] sodium chloride 0.9 % IV bolus 100 mL  100 mL Intravenous Q30 Min PRN    And    [] albumin human (Albumin) 25% injection 25 g  25 g Intravenous PRN Dialysis    cloNIDine (Catapres) tab 0.1 mg  0.1 mg Oral BID    albuterol (Ventolin HFA) 108 (90 Base) MCG/ACT inhaler 2 puff  2 puff Inhalation Q6H PRN    ARIPiprazole (Abilify) tab 15 mg  15 mg Oral Daily    atorvastatin (Lipitor) tab 20 mg  20 mg Oral QPM    buPROPion ER (Wellbutrin XL) 24 hr tab 150 mg  150 mg Oral Daily    calcium acetate (Phoslo) cap 667 mg  667 mg Oral TID with meals    carvedilol (Coreg) tab 25 mg  25 mg Oral BID with meals    FLUoxetine (PROzac) cap 40 mg  40 mg Oral Daily    gabapentin (Neurontin) cap 200 mg  200 mg  Oral TID    hydrALAZINE (Apresoline) tab 25 mg  25 mg Oral TID    HYDROcodone-acetaminophen (Norco) 5-325 MG per tab 1 tablet  1 tablet Oral Q6H PRN    lamoTRIgine (LaMICtal) tab 100 mg  100 mg Oral Daily    losartan (Cozaar) tab 100 mg  100 mg Oral Daily    NIFEdipine ER (Procardia-XL) 24 hr tab 30 mg  30 mg Oral Daily    pantoprazole (Protonix) DR tab 40 mg  40 mg Oral QAM AC    sevelamer carbonate (Renvela) tab 2,400 mg  2,400 mg Oral TID CC    tamsulosin (Flomax) cap 0.4 mg  0.4 mg Oral Daily    umeclidinium bromide (Incruse Ellipta) 62.5 MCG/ACT inhaler 1 puff  1 puff Inhalation Daily    heparin (Porcine) 5000 UNIT/ML injection 5,000 Units  5,000 Units Subcutaneous Q8H MARTHA    acetaminophen (Tylenol Extra Strength) tab 500 mg  500 mg Oral Q6H PRN    HYDROmorphone (Dilaudid) 1 MG/ML injection 0.2 mg  0.2 mg Intravenous Q2H PRN    Or    HYDROmorphone (Dilaudid) 1 MG/ML injection 0.4 mg  0.4 mg Intravenous Q2H PRN    Or    HYDROmorphone (Dilaudid) 1 MG/ML injection 0.8 mg  0.8 mg Intravenous Q2H PRN    glucose (Dex4) 15 GM/59ML oral liquid 15 g  15 g Oral Q15 Min PRN    Or    glucose (Glutose) 40% oral gel 15 g  15 g Oral Q15 Min PRN    Or    glucose-vitamin C (Dex-4) chewable tab 4 tablet  4 tablet Oral Q15 Min PRN    Or    dextrose 50% injection 50 mL  50 mL Intravenous Q15 Min PRN    Or    glucose (Dex4) 15 GM/59ML oral liquid 30 g  30 g Oral Q15 Min PRN    Or    glucose (Glutose) 40% oral gel 30 g  30 g Oral Q15 Min PRN    Or    glucose-vitamin C (Dex-4) chewable tab 8 tablet  8 tablet Oral Q15 Min PRN    insulin aspart (NovoLOG) 100 Units/mL FlexPen 1-5 Units  1-5 Units Subcutaneous TID AC and HS   [2]   Medications Prior to Admission   Medication Sig Dispense Refill Last Dose/Taking    ferrous sulfate 325 (65 FE) MG Oral Tab EC Take 1 tablet (325 mg total) by mouth daily with breakfast.   5/4/2025    tiotropium 18 MCG Inhalation Cap Inhale 1 capsule (18 mcg total) into the lungs in the morning.   Past Week     cyclobenzaprine 5 MG Oral Tab Take 1 tablet (5 mg total) by mouth 3 (three) times daily as needed.   Past Week    HYDROcodone-acetaminophen 5-325 MG Oral Tab Take 1 tablet by mouth every 6 (six) hours as needed for Pain.   Past Week    pantoprazole 40 MG Oral Tab EC Take 1 tablet (40 mg total) by mouth every morning before breakfast.   Past Week    benzonatate 200 MG Oral Cap Take 1 capsule (200 mg total) by mouth 3 (three) times daily as needed for cough. 20 capsule 0 Past Month    polyethylene glycol, PEG 3350, 17 g Oral Powd Pack Take 17 g by mouth daily as needed.   5/4/2025    atorvastatin 20 MG Oral Tab Take 1 tablet (20 mg total) by mouth every evening.   5/4/2025    hydrALAZINE 25 MG Oral Tab Take 1 tablet (25 mg total) by mouth in the morning and 1 tablet (25 mg total) in the evening and 1 tablet (25 mg total) before bedtime.   5/4/2025    cloNIDine 0.1 MG Oral Tab Take 1 tablet (0.1 mg total) by mouth 2 (two) times daily. 60 tablet 0 5/4/2025    NIFEdipine ER 30 MG Oral Tablet 24 Hr Take 1 tablet (30 mg total) by mouth daily. 30 tablet 0 5/4/2025    sevelamer carbonate 800 MG Oral Tab Take 3 tablets (2,400 mg total) by mouth 3 (three) times daily with meals. 270 tablet 0 5/4/2025    calcium acetate 667 MG Oral Cap Take 1 capsule (667 mg total) by mouth with breakfast, with lunch, and with evening meal. 90 capsule 0 5/4/2025    ARIPiprazole 5 MG Oral Tab Take 1 tablet (5 mg total) by mouth in the morning. Take 5mg (1 tablet) by mouth daily. Take each dose with 1 tablet of 10mg aripiprazole for a total daily dose of 15mg aripiprazole.   5/4/2025    ARIPiprazole 10 MG Oral Tab Take 1 tablet (10 mg total) by mouth in the morning. Take 10mg (1 tablet) by mouth daily. Take each dose with 1 tablet of 5mg aripiprazole for a total daily dose of 15mg aripiprazole. .   5/4/2025    ergocalciferol 1.25 MG (22343 UT) Oral Cap Take 1 capsule (50,000 Units total) by mouth once a week. Every Monday.   Past Week    losartan  100 MG Oral Tab Take 1 tablet (100 mg total) by mouth in the morning.   5/4/2025    gabapentin 100 MG Oral Cap Take 2 capsules (200 mg total) by mouth 3 (three) times daily. 180 capsule 0 5/4/2025    VYVANSE 60 MG Oral Cap Take 1 capsule (60 mg total) by mouth every morning.   5/4/2025    lamoTRIgine 100 MG Oral Tab Take 1 tablet (100 mg total) by mouth in the morning.   5/4/2025    albuterol 108 (90 Base) MCG/ACT Inhalation Aero Soln Inhale 2 puffs into the lungs every 6 (six) hours as needed for Wheezing.   Past Week    tamsulosin 0.4 MG Oral Cap Take 1 capsule (0.4 mg total) by mouth in the morning.   5/4/2025    buPROPion  MG Oral Tablet 24 Hr Take 1 tablet (150 mg total) by mouth in the morning.   5/4/2025    FLUoxetine HCl 40 MG Oral Cap Take 1 capsule (40 mg total) by mouth daily. 7 capsule 0 5/4/2025    carvedilol 25 MG Oral Tab Take 1 tablet (25 mg total) by mouth in the morning and 1 tablet (25 mg total) in the evening. Take with meals. 60 tablet 6 5/4/2025    Fluticasone Propionate 50 MCG/ACT Nasal Suspension SPRAY ONCE INTO EACH NOSTRIL BID PRN 15.8 mL 0 5/4/2025    ondansetron 4 MG Oral Tablet Dispersible Take 1 tablet (4 mg total) by mouth every 8 (eight) hours as needed for Nausea.   More than a month   [3]   Past Surgical History:  Procedure Laterality Date    Av fistula revision, open Left     Cabg      Colonoscopy N/A 03/26/2023    Procedure: COLONOSCOPY;  Surgeon: Heath Vu MD;  Location:  ENDOSCOPY    Colonoscopy N/A 12/30/2023    Procedure: COLONOSCOPY with cold snare polypectomy and forcep polypectomy;  Surgeon: Ousmane Suarez MD;  Location:  ENDOSCOPY    Colonoscopy N/A 3/9/2025    Procedure: COLONOSCOPY WITH COLD SNARE POLYPECTOMY AND ENDO CLIPS X 2;  Surgeon: Bakari Noguera MD;  Location:  ENDOSCOPY    Colonoscopy & polypectomy  2019    Egd  2019    Duodenitis. Biopsied. EUS for weight loss was negative    Heart surgery      Hernia surgery  08/17/2022    Dr Barnes     Laminectomy,>2 sgmt,lumbar  09/06/2018    L4-L5 Decomp Discectomy ROEM L4-L5    Mitralplasty w cp bypass  1994    Christiano: Repair    Repair rotator cuff,chronic Left     torn and had a ruptured bicep    Sinus surgery        Spine surgery procedure unlisted      Valve repair  1994    mitral valve   [4]   Social History  Socioeconomic History    Marital status:    Tobacco Use    Smoking status: Former     Current packs/day: 0.00     Average packs/day: 1 pack/day for 27.0 years (27.0 ttl pk-yrs)     Types: Cigarettes     Start date: 2/28/1995     Quit date: 2/28/2022     Years since quitting: 3.1     Passive exposure: Never    Smokeless tobacco: Never   Vaping Use    Vaping status: Never Used   Substance and Sexual Activity    Alcohol use: No    Drug use: No

## 2025-05-08 NOTE — BRIEF OP NOTE
Pre-Operative Diagnosis: Abdominal pain [R10.9]     Post-Operative Diagnosis: HIATAL HERNIA, MILD ESOPHAGITIS      Procedure Performed:   ESOPHAGOGASTRODUODENOSCOPY (EGD) WITH BIOPSIES    Surgeons and Role:     * Ousmane Suarez MD - Primary    Assistant(s):        Surgical Findings: see above     Specimen: stomach and duodenum     Estimated Blood Loss: No data recorded    Dictation Number:  none    Ousmane Suarez MD  5/8/2025  10:44 AM

## 2025-05-08 NOTE — ANESTHESIA POSTPROCEDURE EVALUATION
[facility]    Godwin Fonseca Patient Status:  Inpatient   Age/Gender 45 year old male MRN [id number]   Location [facility] END[facility] Attending Yuriy Forrest MD   Hosp Day # 3 PCP Adrian Small MD       Anesthesia Post-op Note    ESOPHAGOGASTRODUODENOSCOPY (EGD) WITH BIOPSIES    Procedure Summary       Date: 05/08/25 Room / Location:  ENDOSCOPY 02 /  ENDOSCOPY    Anesthesia Start: 1030 Anesthesia Stop: 1044    Procedure: ESOPHAGOGASTRODUODENOSCOPY (EGD) WITH BIOPSIES Diagnosis:       Abdominal pain      (HIATAL HERNIA, MILD ESOPHAGITIS)    Surgeons: Ousmane Suarez MD Anesthesiologist: Candelario Taveras MD    Anesthesia Type: MAC ASA Status: 3            Anesthesia Type: MAC    Vitals Value Taken Time   /74 05/08/25 10:45   Temp 97.3 °F (36.3 °C) 05/08/25 10:45   Pulse 77 05/08/25 10:45   Resp 16 05/08/25 10:45   SpO2 92 % 05/08/25 10:45           Patient Location: Endoscopy    Anesthesia Type: MAC    Airway Patency: patent    Postop Pain Control: adequate    Mental Status: mildly sedated but able to meaningfully participate in the post-anesthesia evaluation    Nausea/Vomiting: none    Cardiopulmonary/Hydration status: stable euvolemic    Complications: no apparent anesthesia related complications    Postop vital signs: stable    Dental Exam: Unchanged from Preop    Patient to be discharged from PACU when criteria met.

## 2025-05-08 NOTE — PROGRESS NOTES
A/Ox4, RA, SL, NSR on tele.  Up ad osvaldo.  Low urine output d/t HD.  Pain managed with prn medication, no complaints of nausea this evening.  Left arm precautions in place.  Regular diet tolerated well, plan to go NPO at 0500 this AM.  Pt updated on POC, call light within reach, questions and concerns addressed.   Pt declines skin check this evening.

## 2025-05-08 NOTE — PROGRESS NOTES
Alert & oriented x4. VSS on room air. NSR on tele. Oliguric related to HD. Tolerating regular/sodium-restricted diet. I will make him NPO at 3pm in case MRCP can get done today. Ambulates independently. Denies nausea/chest pain/SOB. Pain controlled with PRN dilaudid. HD perm-cath intact. Left arm fistula slightly swollen. Ela called to schedule dialysis for after MRI (which will not happen until late tonight at the earliest). Patient updated on plan of care. Questions and concerns addressed. Frequent rounding performed

## 2025-05-08 NOTE — PROGRESS NOTES
[facility]   part of [facility]    Nephrology Progress Note    Godwin Fonseca Attending:  Yuriy Forrest MD       SUBJECTIVE:     Duplex AVF with venous thrombus.  Underwent EGD today.  Tolerating PO now.  Plan for MRCP noted.      PHYSICAL EXAM:     Vital Signs: /66 (BP Location: Left arm)   Pulse 70   Temp 97.8 °F (36.6 °C) (Oral)   Resp 16   Wt 237 lb 7 oz (107.7 kg)   SpO2 96%   BMI 30.48 kg/m²   Temp (24hrs), Av.1 °F (36.7 °C), Min:97.3 °F (36.3 °C), Max:98.8 °F (37.1 °C)       Intake/Output Summary (Last 24 hours) at 2025 1333  Last data filed at 2025 1114  Gross per 24 hour   Intake 1300 ml   Output 0 ml   Net 1300 ml     Wt Readings from Last 3 Encounters:   25 237 lb 7 oz (107.7 kg)   25 237 lb 6.4 oz (107.7 kg)   25 229 lb 14.4 oz (104.3 kg)     Gen - laying in bed, nad  HEENT - NCAT  CV - RRR  Resp - CTAB  Abd - soft   - no lobo  Ext - warm no leg swelling  Vascular - LIJ tunnelled HD catheter, LUE AVF + thrill and bruit with distal small aneurysmal dilation       LABORATORY DATA:     Lab Results   Component Value Date    GLU 91 2025    BUN 29 (H) 2025    BUNCREA 13.0 2022    CREATSERUM 8.05 (H) 2025    ANIONGAP 14 2025    GFR 59 (L) 2018    GFRNAA 30 (L) 2022    GFRAA 35 (L) 2022    CA 8.8 2025    OSMOCALC 291 2025    ALKPHO 130 (H) 2025    AST 26 2025    ALT 17 2025    BILT 0.5 2025    TP 7.5 2025    ALB 3.9 2025    GLOBULIN 3.2 2025     2025    K 3.7 2025    CL 99 2025    CO2 25.0 2025     Lab Results   Component Value Date    WBC 5.9 2025    RBC 2.69 (L) 2025    HGB 8.6 (L) 2025    HCT 24.7 (L) 2025    .0 2025    MPV 11.5 2012    MCV 91.8 2025    MCH 32.0 2025    MCHC 34.8 2025    RDW 12.6 2025    NEPRELIM 5.14 2025    NEPERCENT 69.3  05/05/2025    LYPERCENT 15.7 05/05/2025    MOPERCENT 10.0 05/05/2025    EOPERCENT 3.5 05/05/2025    BAPERCENT 1.2 05/05/2025    NE 5.14 05/05/2025    LYMABS 1.16 05/05/2025    MOABSO 0.74 05/05/2025    EOABSO 0.26 05/05/2025    BAABSO 0.09 05/05/2025     Lab Results   Component Value Date    MALBP 167.00 05/24/2023    CREUR 142.00 05/24/2023    CREUR 142.00 05/24/2023     Lab Results   Component Value Date    COLORUR Light-Yellow 03/30/2025    CLARITY Clear 03/30/2025    SPECGRAVITY 1.028 03/30/2025    GLUUR Normal 03/30/2025    BILUR Negative 03/30/2025    KETUR Negative 03/30/2025    BLOODURINE 2+ (A) 03/30/2025    PHURINE 6.5 03/30/2025    PROUR 100 (A) 03/30/2025    UROBILINOGEN Normal 03/30/2025    NITRITE Negative 03/30/2025    LEUUR Negative 03/30/2025    WBCUR 1-5 03/30/2025    RBCUR 0-2 03/30/2025    EPIUR Few (A) 03/30/2025    BACUR None Seen 03/30/2025    CAOXUR Occasional (A) 04/20/2018    HYLUR Present (A) 05/14/2019         IMAGING:     Reviewed.    MEDICATIONS:       Current Hospital Medications[1]    ASSESSMENT/PLAN:     46 yo M with history of ESRD on iHD MWF via LUE AVF, HTN, severe MR s/p MVR x 2, HFpEF, DVT/PE, TIA, MGUS, bipolar disorder, recurrent GI bleed, diffuse body pains presents with n/v/abdominal pain.     ESRD:  -- HD MWF per outpatient schedule but if receiving gadolinium will need dialysis immediately after then 2 sessions consecutively  -- avoid morphine  -- gabapentin should be renally dosed     Anemia:  -- epo 5/7     MBD:  -- trend phos  -- sevelamer 2400 TID AC  -- calcium acetate 667 TIDAC  -- low phos diet     HTN:  -- clonidine should be continued BID to avoid rebound  -- takes remaining antihypertensives after HD but ok to hold hydralazine if there is a concern for dropping too much BP after HD; hold pre HD for now since he has dropped BP with UF in the past here     LUE AVF with arm swelling:  -- L arm precautions  -- US duplex dialysis access with thrombus  -- vascular  surgery consult  -- consideration for anticoagulation to be balanced with prior GI bleeding; defer to primary/vascular          Thank you for allowing me to participate in the care of this patient. Please do not hesitate to call with questions or concerns.        MD Kevin Villafana Nephrology         [1]   Current Facility-Administered Medications   Medication Dose Route Frequency    amphetamine-dextroamphetamine (Adderall) tab 10 mg  10 mg Oral BID AC    sodium chloride 0.9 % IV bolus 100 mL  100 mL Intravenous Q30 Min PRN    And    albumin human (Albumin) 25% injection 25 g  25 g Intravenous PRN Dialysis    sucralfate (Carafate) tab 1 g  1 g Oral TID AC and HS (2200)    heparin (Porcine) 1000 UNIT/ML injection 4,000 Units  4,000 Units Intracatheter PRN Dialysis    cloNIDine (Catapres) tab 0.1 mg  0.1 mg Oral BID    albuterol (Ventolin HFA) 108 (90 Base) MCG/ACT inhaler 2 puff  2 puff Inhalation Q6H PRN    ARIPiprazole (Abilify) tab 15 mg  15 mg Oral Daily    atorvastatin (Lipitor) tab 20 mg  20 mg Oral QPM    buPROPion ER (Wellbutrin XL) 24 hr tab 150 mg  150 mg Oral Daily    calcium acetate (Phoslo) cap 667 mg  667 mg Oral TID with meals    carvedilol (Coreg) tab 25 mg  25 mg Oral BID with meals    FLUoxetine (PROzac) cap 40 mg  40 mg Oral Daily    gabapentin (Neurontin) cap 200 mg  200 mg Oral TID    hydrALAZINE (Apresoline) tab 25 mg  25 mg Oral TID    HYDROcodone-acetaminophen (Norco) 5-325 MG per tab 1 tablet  1 tablet Oral Q6H PRN    lamoTRIgine (LaMICtal) tab 100 mg  100 mg Oral Daily    losartan (Cozaar) tab 100 mg  100 mg Oral Daily    NIFEdipine ER (Procardia-XL) 24 hr tab 30 mg  30 mg Oral Daily    pantoprazole (Protonix) DR tab 40 mg  40 mg Oral QAM AC    sevelamer carbonate (Renvela) tab 2,400 mg  2,400 mg Oral TID CC    tamsulosin (Flomax) cap 0.4 mg  0.4 mg Oral Daily    umeclidinium bromide (Incruse Ellipta) 62.5 MCG/ACT inhaler 1 puff  1 puff Inhalation Daily    heparin (Porcine) 5000 UNIT/ML  injection 5,000 Units  5,000 Units Subcutaneous Q8H MARTHA    acetaminophen (Tylenol Extra Strength) tab 500 mg  500 mg Oral Q6H PRN    HYDROmorphone (Dilaudid) 1 MG/ML injection 0.2 mg  0.2 mg Intravenous Q2H PRN    Or    HYDROmorphone (Dilaudid) 1 MG/ML injection 0.4 mg  0.4 mg Intravenous Q2H PRN    Or    HYDROmorphone (Dilaudid) 1 MG/ML injection 0.8 mg  0.8 mg Intravenous Q2H PRN    glucose (Dex4) 15 GM/59ML oral liquid 15 g  15 g Oral Q15 Min PRN    Or    glucose (Glutose) 40% oral gel 15 g  15 g Oral Q15 Min PRN    Or    glucose-vitamin C (Dex-4) chewable tab 4 tablet  4 tablet Oral Q15 Min PRN    Or    dextrose 50% injection 50 mL  50 mL Intravenous Q15 Min PRN    Or    glucose (Dex4) 15 GM/59ML oral liquid 30 g  30 g Oral Q15 Min PRN    Or    glucose (Glutose) 40% oral gel 30 g  30 g Oral Q15 Min PRN    Or    glucose-vitamin C (Dex-4) chewable tab 8 tablet  8 tablet Oral Q15 Min PRN    insulin aspart (NovoLOG) 100 Units/mL FlexPen 1-5 Units  1-5 Units Subcutaneous TID AC and HS

## 2025-05-09 VITALS
HEART RATE: 96 BPM | OXYGEN SATURATION: 97 % | BODY MASS INDEX: 30 KG/M2 | RESPIRATION RATE: 18 BRPM | SYSTOLIC BLOOD PRESSURE: 122 MMHG | WEIGHT: 237.44 LBS | DIASTOLIC BLOOD PRESSURE: 84 MMHG | TEMPERATURE: 98 F

## 2025-05-09 LAB
ALBUMIN SERPL-MCNC: 4 G/DL (ref 3.2–4.8)
ANION GAP SERPL CALC-SCNC: 11 MMOL/L (ref 0–18)
BUN BLD-MCNC: 41 MG/DL (ref 9–23)
CALCIUM BLD-MCNC: 8.6 MG/DL (ref 8.7–10.6)
CHLORIDE SERPL-SCNC: 102 MMOL/L (ref 98–112)
CO2 SERPL-SCNC: 24 MMOL/L (ref 21–32)
CREAT BLD-MCNC: 10.11 MG/DL (ref 0.7–1.3)
EGFRCR SERPLBLD CKD-EPI 2021: 6 ML/MIN/1.73M2 (ref 60–?)
ERYTHROCYTE [DISTWIDTH] IN BLOOD BY AUTOMATED COUNT: 13.9 %
EST. AVERAGE GLUCOSE BLD GHB EST-MCNC: 74 MG/DL (ref 68–126)
GLUCOSE BLD-MCNC: 128 MG/DL (ref 70–99)
GLUCOSE BLD-MCNC: 131 MG/DL (ref 70–99)
GLUCOSE BLD-MCNC: 155 MG/DL (ref 70–99)
HBA1C MFR BLD: 4.2 % (ref ?–5.7)
HCT VFR BLD AUTO: 26 % (ref 39–53)
HGB BLD-MCNC: 9 G/DL (ref 13–17.5)
MCH RBC QN AUTO: 32.1 PG (ref 26–34)
MCHC RBC AUTO-ENTMCNC: 34.6 G/DL (ref 31–37)
MCV RBC AUTO: 92.9 FL (ref 80–100)
OSMOLALITY SERPL CALC.SUM OF ELEC: 297 MOSM/KG (ref 275–295)
PHOSPHATE SERPL-MCNC: 6.5 MG/DL (ref 2.4–5.1)
PLATELET # BLD AUTO: 205 10(3)UL (ref 150–450)
POTASSIUM SERPL-SCNC: 3.9 MMOL/L (ref 3.5–5.1)
RBC # BLD AUTO: 2.8 X10(6)UL (ref 4.3–5.7)
SODIUM SERPL-SCNC: 137 MMOL/L (ref 136–145)
WBC # BLD AUTO: 5.9 X10(3) UL (ref 4–11)

## 2025-05-09 PROCEDURE — 90935 HEMODIALYSIS ONE EVALUATION: CPT | Performed by: INTERNAL MEDICINE

## 2025-05-09 PROCEDURE — 85027 COMPLETE CBC AUTOMATED: CPT | Performed by: INTERNAL MEDICINE

## 2025-05-09 PROCEDURE — 80069 RENAL FUNCTION PANEL: CPT | Performed by: INTERNAL MEDICINE

## 2025-05-09 PROCEDURE — 83036 HEMOGLOBIN GLYCOSYLATED A1C: CPT | Performed by: INTERNAL MEDICINE

## 2025-05-09 PROCEDURE — 82962 GLUCOSE BLOOD TEST: CPT

## 2025-05-09 RX ORDER — POLYETHYLENE GLYCOL 3350 17 G/17G
17 POWDER, FOR SOLUTION ORAL 2 TIMES DAILY
Status: DISCONTINUED | OUTPATIENT
Start: 2025-05-09 | End: 2025-05-09

## 2025-05-09 RX ORDER — FLUOXETINE HYDROCHLORIDE 40 MG/1
40 CAPSULE ORAL DAILY
Qty: 10 CAPSULE | Refills: 0 | Status: SHIPPED | OUTPATIENT
Start: 2025-05-09

## 2025-05-09 RX ORDER — FLUTICASONE PROPIONATE 50 MCG
1 SPRAY, SUSPENSION (ML) NASAL DAILY
Status: DISCONTINUED | OUTPATIENT
Start: 2025-05-09 | End: 2025-05-09

## 2025-05-09 RX ORDER — DICYCLOMINE HYDROCHLORIDE 10 MG/1
10 CAPSULE ORAL 3 TIMES DAILY PRN
Status: DISCONTINUED | OUTPATIENT
Start: 2025-05-09 | End: 2025-05-09

## 2025-05-09 RX ORDER — DICYCLOMINE HYDROCHLORIDE 10 MG/1
10 CAPSULE ORAL 3 TIMES DAILY PRN
Qty: 30 CAPSULE | Refills: 0 | Status: SHIPPED | OUTPATIENT
Start: 2025-05-09

## 2025-05-09 NOTE — DISCHARGE SUMMARY
[facility] Discharge Summary   Patient ID:  Godwin Fonseca  [id number]  45 year old  4/12/1980    Admit date: 5/5/2025    Discharge date and time: 5/9/2025     Attending Physician: Yuriy Forrest MD     Primary Care Physician: Adrian Small MD     Admit Dx: ESRD (end stage renal disease) (HCC) [N18.6]  Nausea vomiting and diarrhea [R11.2, R19.7]  Intractable abdominal pain [R10.9]    Hospital Discharge Diagnoses:  abd pain/ESRD    Disposition: home    Important follow up:  -PCP in [x] within 7 days [] within 14 days [] other   Ousmane Suarez MD  [address]  [address]  [facility] [address] [phone]    Follow up in 4 week(s)        Hospital Course:   45 year old male with PMH sig for HTN, CHF, ESRD on HD, DM, bipolar do, hilario, here for abd pain     Impression     -N/V/D  -abd pain  -elevated lipase      -ESRD on HD   -secondary hyperparathyroidism     -chronic HFpEF  -HTN, uncontrolled  -hx of MV repair     -DM 2     -MDD  -HILARIO  -Bipolar      Plan     *GI  HIATAL HERNIA, MILD ESOPHAGITIS    -appears viral GE like symptoms  -also likely undering IBS  -check stool cx - no stools  -has upper abd pain - RUQ ultrasound to eval GB. Mild lipase elevation but doubt pancreatitis - has had elevation previously - GB with sludge, borderline CBD  -started on diet, sxs improving  - MRCP without abd pathology; f/u findings in liver as o/p   - c/s GI as has chronic sxs; d/w [facility]   - EGD 5/8 c mild esophagitis and a hiatal hernia. PPI and bentyl prn   - sxs seem better today         *CV  -on multiple bp meds at home, cont      *Endo  -iss     *renal  -HD MWF    -US of LUE fistula -->thrombus measures 4.2 cm in the venous outflow tract versus 2.6 cm on prior exam.   - VASCULAR notified, likely will need new fistula as o/p   - d/w Dr. Bond  - L arm precautions       Scds  Heparin     Day of discharge Exam   Vitals:    05/09/25 1242   BP: 122/84   Pulse: 96   Resp:    Temp:        Exam on day of  discharge:  Taking PO ok. Abd pain ok    Gen: No acute distress, alert and oriented  CV: RRR, +s1/s2  Lungs: CTAB, good respiratory effort  Abdomen: s/nt/nd  Ext: Moves all 4 extremities, no c/c/e. LUE fistula  Neuro: CN Intact, no focal deficits      Discharge meds     Medication List        START taking these medications      dicyclomine 10 MG Caps  Commonly known as: Bentyl  Take 1 capsule (10 mg total) by mouth 3 (three) times daily as needed.            CONTINUE taking these medications      albuterol 108 (90 Base) MCG/ACT Aers  Commonly known as: Ventolin HFA     * ARIPiprazole 5 MG Tabs  Commonly known as: Abilify     * ARIPiprazole 10 MG Tabs  Commonly known as: Abilify     atorvastatin 20 MG Tabs  Commonly known as: Lipitor     benzonatate 200 MG Caps  Commonly known as: Tessalon  Take 1 capsule (200 mg total) by mouth 3 (three) times daily as needed for cough.     buPROPion  MG Tb24  Commonly known as: Wellbutrin XL     calcium acetate 667 MG Caps  Commonly known as: Phoslo  Take 1 capsule (667 mg total) by mouth with breakfast, with lunch, and with evening meal.     carvedilol 25 MG Tabs  Commonly known as: Coreg  Take 1 tablet (25 mg total) by mouth in the morning and 1 tablet (25 mg total) in the evening. Take with meals.     cloNIDine 0.1 MG Tabs  Commonly known as: Catapres  Take 1 tablet (0.1 mg total) by mouth 2 (two) times daily.     cyclobenzaprine 5 MG Tabs  Commonly known as: Flexeril     ergocalciferol 1.25 MG (98907 UT) Caps  Commonly known as: Vitamin D2     ferrous sulfate 325 (65 FE) MG Tbec     FLUoxetine HCl 40 MG Caps  Commonly known as: PROZAC  Take 1 capsule (40 mg total) by mouth daily.     fluticasone propionate 50 MCG/ACT Susp  Commonly known as: Flonase  SPRAY ONCE INTO EACH NOSTRIL BID PRN     gabapentin 100 MG Caps  Commonly known as: Neurontin  Take 2 capsules (200 mg total) by mouth 3 (three) times daily.     hydrALAZINE 25 MG Tabs  Commonly known as: Apresoline      HYDROcodone-acetaminophen 5-325 MG Tabs  Commonly known as: Norco     lamoTRIgine 100 MG Tabs  Commonly known as: LaMICtal     losartan 100 MG Tabs  Commonly known as: Cozaar     NIFEdipine ER 30 MG Tb24  Commonly known as: Adalat CC  Take 1 tablet (30 mg total) by mouth daily.     ondansetron 4 MG Tbdp  Commonly known as: Zofran-ODT     pantoprazole 40 MG Tbec  Commonly known as: Protonix     Polyethylene Glycol 3350 17 g Pack  Commonly known as: MIRALAX     sevelamer carbonate 800 MG Tabs  Commonly known as: Renvela  Take 3 tablets (2,400 mg total) by mouth 3 (three) times daily with meals.     tamsulosin 0.4 MG Caps  Commonly known as: Flomax     tiotropium 18 MCG Caps  Commonly known as: Spiriva Handihaler     Vyvanse 60 MG Caps  Generic drug: Lisdexamfetamine Dimesylate           * This list has 2 medication(s) that are the same as other medications prescribed for you. Read the directions carefully, and ask your doctor or other care provider to review them with you.                   Where to Get Your Medications        These medications were sent to [id number] - [place], [place] - [address] [phone], [phone]  [address] [place] [id number]      Phone: [phone]   dicyclomine 10 MG Caps  FLUoxetine HCl 40 MG Caps         Consults: IP CONSULT TO HOSPITALIST  IP CONSULT TO HOSPITALIST  IP CONSULT TO NEPHROLOGY  IP CONSULT TO GASTROENTEROLOGY  IP CONSULT TO VASCULAR SURGERY    Radiology: MRI ABDOMEN AND MRCP W/3D (KBF=56916/67289)  Result Date: 5/9/2025  PROCEDURE:  MRI ABDOMEN&MRCP W/3D (CPT=74181/08245)  COMPARISON:  [facility], CT, CT CHEST+ABDOMEN+PELVIS(CPT=71250/72390), 4/19/2025, 11:47 PM.  [facility], US ABDOMEN LIMITED (CPT=76705), 5/06/2025, 6:01 AM.  INDICATIONS:  Abdominal pain, eval CBD  TECHNIQUE:    Multiplanar single shot fast spin echo, chemical shift imaging, breath hold T2 weighted images and 2-D fiesta sequences were acquired. No contrast material was administered.  Fluid sensitive imaging with MRCP. Reconstruction was also performed including 3D multi-projection imaging.  Both projection and source MRCP images are evaluated.  3-D RENDERING:   PATIENT STATED HISTORY:(As transcribed by Technologist)  eval CBD    FINDINGS:  LIVER:  No enlargement, atrophy, abnormal density, or significant focal lesion.  Diffuse low signal may represent hemosiderosis.  BILIARY:  No visible dilatation or filling defect.  PANCREAS:  No lesion, fluid collection, ductal dilatation, or atrophy.  SPLEEN:  No enlargement or focal lesion.  KIDNEYS:  No mass or obstruction.  Incidental tiny bilateral renal cysts. ADRENALS:  No mass or enlargement.  AORTA/VASCULAR:  No aneurysm or dissection.  RETROPERITONEUM:  No mass or adenopathy.  BOWEL/MESENTERY:  No visible mass, obstruction, or bowel wall thickening.  ABDOMINAL WALL:  No mass or hernia.  PELVIC NODES:  Normal. BONES:  No bony lesion or fracture.  LUNG BASES:  No visible pleural disease.  Lung bases not well assessed with MRI.              CONCLUSION:  1. No acute abnormality demonstrated MRI of the abdomen and MRCP. 2. Diffuse low signal intensity of the liver correlates with relative high attenuation of liver seen on CT of 4/19/2025.  Differential considerations include hemosiderosis and amiodarone therapy.   LOCATION:  [facility]   Dictated by (CST): Ricki Zaragoza MD on 5/09/2025 at 7:34 AM     Finalized by (CST): Ricki Zaragoza MD on 5/09/2025 at 7:42 AM       US DUPLEX SCAN HEMODIALYSIS ACCESS  (CPT=93990)  Result Date: 5/7/2025  PROCEDURE:  US DUPLEX SCAN HEMODIALYSIS ACCESS  (CPT=93990)  COMPARISON:  [facility], US ARTERIAL DUPLEX UPPER EXTREMITY LEFT (CPT=93931), 4/23/2025, 2:07 PM.  INDICATIONS:  left arm swelling  TECHNIQUE:  Real time gray scale and duplex color Doppler sonography was used to evaluate the left upper extremity arterial and venous system. Being noted two-dimensional images of the vascular structures, Doppler spectral analysis,  and color Doppler imaging were performed  PATIENT STATED HISTORY: (As transcribed by Technologist)  Left forearm pain.    FINDINGS:   Left proximal radial velocity is 212 centimeters/second proximal to the anastomosis. Left distal radial osseous 60 centimeters/second distal to the anastomosis.  The radiocephalic fistula arterial anastomosis velocity is 115 centimeters/second. Proximal fistula velocity is 156 centimeters/second. Mid fistula velocity 75 centimeter/second. Distal fistula is occluded at the level elbow at the venous anastomosis.  There is 4.2 cm of thrombus in the left fistula venous anastomosis extending to the cephalic vein.             CONCLUSION:  Similar findings to prior examination.  However, thrombus measures 4.2 cm in the venous outflow tract versus 2.6 cm on prior exam.   LOCATION:  [facility]    Dictated by (CST): Ryan Hernández MD on 5/07/2025 at 4:24 PM     Finalized by (CST): Ryan Hernández MD on 5/07/2025 at 4:29 PM       US ABDOMEN LIMITED (CPT=76705)  Result Date: 5/6/2025  PROCEDURE:  US ABDOMEN LIMITED (CPT=76705)  COMPARISON:  [facility], US ABDOMEN COMPLETE (CPT=76700), 6/01/2019, 8:15 AM.  INDICATIONS:  ruq us  PATIENT STATED HISTORY: (As transcribed by Technologist)     FINDINGS:  LIVER:  Normal size and echogenicity. No significant masses. BILIARY:  Mild gallbladder sludge.  Borderline dilation of the common bile duct measuring 7 mm.  No significant gallbladder wall thickening. PANCREAS:  Poorly assessed secondary to overlying bowel gas. OTHER:  Negative.            CONCLUSION:  Mild gallbladder sludge and borderline dilation of the common bile duct.    LOCATION:  [facility]   Dictated by ([facility]): Duy De La Rosa MD on 5/06/2025 at 7:20 AM     Finalized by (CST): Duy De La Rosa MD on 5/06/2025 at 7:22 AM       US VENOUS DOPPLER ARM LEFT - DIAG IMG (CPT=93971)  Result Date: 4/29/2025  PROCEDURE:  US VENOUS DOPPLER ARM LEFT - DIAG IMG (CPT=93971)  COMPARISON:  [facility]  US, US VENOUS DOPPLER ARM LEFT - DIAG IMG (CPT=93971), 4/04/2025, 5:49 PM.  INDICATIONS:  Left upper extremity pain and swelling, neck swelling  TECHNIQUE:  Real time, grey scale, and duplex ultrasound was used to evaluate the upper extremity venous system. B-mode two-dimensional images of the vascular structures, Doppler spectral analysis, and color flow.  Doppler imaging were performed.  The following veins were imaged:  Subclavian, Jugular, Axillary, Brachial, Basilic, Cephalic, and the contralateral Subclavian and Jugular.  PATIENT STATED HISTORY: (As transcribed by Technologist)     FINDINGS:  EXTREMITY:  Left upper extremity THROMBI:  None visible. COMPRESSION:  Normal compressibility, phasicity, and augmentation of the subclavian, jugular, axillary, brachial, basilic, and cephalic veins. OTHER:  Negative.            CONCLUSION:  No evidence of deep venous thrombosis of the left upper extremity   LOCATION:  [facility]   Dictated by (CST): Claude Singh MD on 4/29/2025 at 5:31 PM     Finalized by (CST): Claude Singh MD on 4/29/2025 at 5:32 PM       US HEAD/NECK (CPT=76536)  Result Date: 4/28/2025  PROCEDURE:  US HEAD/NECK (CPT=76536)  COMPARISON:  [facility] PERMANENT CATHETER INSERTION EXCHNGE CHECK, 4/22/2025, 8:57 AM.  INDICATIONS:  Palpable lump superior to PermCath insertion site.  TECHNIQUE:  Sonography was performed of the clinically requested area of interest.  PATIENT STATED HISTORY: (As transcribed by Technologist)     FINDINGS:  MASSES:  There is a hypoechoic lobulated structure within the subcutaneous tissue of the left neck along the region the palpable abnormality measuring 11 x 6 mm x 7 mm in size.  There is no flow within this lesion.  This could represent a focus of scar tissue or possibly a small hematoma. FLUID COLLECTIONS:  None. OTHER:  Negative.            CONCLUSION:  Hypoechoic nodular density correspond to palpable abnormality within the left neck subcutaneous tissues could represent a  small hematoma or nodular focus of scar tissue.   LOCATION:  [facility]   Dictated by (CST): Jaycob Cassidy MD on 4/28/2025 at 5:00 PM     Finalized by (CST): Jaycob Cassidy MD on 4/28/2025 at 5:04 PM       XR CHEST AP PORTABLE  (CPT=71045)  Result Date: 4/27/2025  PROCEDURE:  XR CHEST AP PORTABLE  (CPT=71045)  TECHNIQUE:  AP chest radiograph was obtained.  COMPARISON:  [facility], XR, XR CHEST AP PORTABLE  (CPT=71045), 4/19/2025, 10:33 PM.  INDICATIONS:  New dialysis catheter with swelling and pain. Placed Thursday  PATIENT STATED HISTORY: (As transcribed by Technologist)    New dialysis catheter with swelling and pain. Placed Thursday .             CONCLUSION:  Central venous catheter tips in SVC.  Stable airspace disease and effusion in the right midlung and base.  Stable heart size and pulmonary vascularity.  No pneumothorax.   LOCATION:  [facility]      Dictated by (CST): Arlyn Oglesby MD on 4/27/2025 at 7:34 AM     Finalized by (CST): Arlyn Oglesby MD on 4/27/2025 at 7:35 AM       US ARTERIAL DUPLEX UPPER EXTREMITY LEFT (CPT=93931)  Result Date: 4/23/2025  PROCEDURE:  US ARTERIAL DUPLEX UPPER EXTREMITY LEFT (CPT=93931)  COMPARISON:  None.  INDICATIONS:  Dialysis fistula malfunction  TECHNIQUE:  Real time grey scale and duplex ultrasound was used to evaluate the upper extremity arterial system. B-mode two dimensional images of the vascular structures, Doppler spectral analysis, and color flow Doppler imaging were performed.  PATIENT STATED HISTORY: (As transcribed by Technologist)  Unsuccessful dialysis access.    FINDINGS:  There is a radiocephalic fistula in the left upper extremity.  The radial artery is patent with flow velocity measuring up to 237 centimeters/second proximal to the anastomosis and cell approximately 77 centimeters/second distal to the anastomosis.  Arterial venous anastomosis is widely patent with flow rate measuring approximately 160 centimeters per 2nd.  The venous outflow is patent proximally,  though appears occluded at the level of the elbow with intramural thrombus spanning a segment of approximately 2.6 centimeters.             CONCLUSION:  1. Left upper extremity fistula.  Approximately 2.6 cm segment of thrombus within the venous outflow tract the level of the elbow.  This corresponds to the recent fistulagram findings. 2. Arterial venous anastomosis is patent.   LOCATION:  [facility]    Dictated by (CST): Duy De La Rosa MD on 4/23/2025 at 3:25 PM     Finalized by (CST): Duy De La Rosa MD on 4/23/2025 at 3:29 PM       IR CENTRAL VENOUS ACCESS  Result Date: 4/22/2025            PROCEDURE:  IR PERMANENT CATHETER INSERTION EXCHNGE CHECK  INDICATIONS:  ESRD  TECHNIQUE:  Prior to the procedure, I discussed with the patient and/or legal representative the potential benefits, risks, and side effects of this procedure, the likelihood of the patient achieving goals; and the potential problems that might occur during recuperation.  I discussed reasonable alternatives to the procedure, including risks, benefits, steps to prevent infection and side effects related to the alternatives, and risks related to not receiving this procedure. A witnessed verbal and signed consent was obtained and documented in the patient's chart.  IV was checked and maintained by the nurse. Moderate conscious sedation was performed under continuous pulse oximetry and cardiac monitoring under my direct supervision.  The radiology nurse was in attendance throughout the exam. Moderate conscious sedation of 2 mg Versed and 100 mcg of Fentanyl was given.  Patient was assessed and monitoring of oxygen saturation, heart rate, and blood pressure by the nursing staff and myself during the exam for a total intraservice time of 12 minutes of conscious sedation time from 915 to 927.  Two grams of Ancef were given pre-procedurally via existing IV.  The patient was placed supine on the angiographic table and the left chest and neck was prepped and  covered with a full body drape in the usual sterile fashion.  All operators present for the case performed standard pre-procedural prep including hand washing, sterile gloves, gown, mask, and cap.  All aspects of the maximum sterile barrier technique were followed.  A preprocedural time out was performed with all physicians, technologists, and nurses involved with the procedure. Ultrasound of the right neck demonstrated a severely stenotic right internal jugular vein. Sonography of the left  neck demonstrated a patent internal jugular vein and a permanent image was obtained.  Under sonographic guidance, the left   internal jugular vein was accessed using a micropuncture system.  A guidewire was advanced into the IVC under fluoroscopic guidance.  Using blunt dissection a dual lumen tunneled catheter was placed via a peel-away sheath.  Both lumens flushed and returned easily.  The catheter was secured and heparinized.  A small amount of Dermabond was placed at the neck venotomy site.   Sterile gauze and transparent dressing was applied.  The patient tolerated the procedure well and there were no immediate complications. Routine post tunneled catheter insertion instructions were communicated to the patient.  ESTIMATED BLOOD LOSS: Less than 5cc.  FLUORO TIME/DOSE:  0.4 minutes  AIR KERMA:  5.84 mGy  IMPRESSION: Successful placement of left internal jugular vein tunneled dialysis catheter ( 27 cm tip to cuff Bard HemoSplit catheter)   LOCATION:  [facility]    Dictated by (CST): Duy De La Rosa MD on 4/22/2025 at 10:16 AM     Finalized by (CST): Duy De La Rosa MD on 4/22/2025 at 10:18 AM       IR GRAFT PROCEDURE  Result Date: 4/21/2025  PROCEDURE:  IR AV GRAFT DECLOT CHECK  INDICATIONS:  Left arm swelling  TECHNIQUE: Prior to the procedure, I discussed with the patient and/or legal representative the potential benefits, risks, and side effects of this procedure, the likelihood of the patient achieving goals; and the potential  problems that might occur during recuperation.  I discussed reasonable alternatives to the procedure, including risks, benefits, steps to prevent infection and side effects related to the alternatives, and risks related to not receiving this procedure. A witnessed verbal and signed consent was obtained and documented in the patient's chart.  IV was checked and maintained by the nurse. Moderate conscious sedation was performed under continuous pulse oximetry and cardiac monitoring under my direct supervision.  The radiology nurse was in attendance throughout the exam. Moderate conscious sedation of 1 mg Versed and 50 mcg of Fentanyl was given.  Patient was assessed and monitoring of oxygen saturation, heart rate, and blood pressure by the nursing staff and myself during the exam for a total intraservice time of 28 minutes of conscious sedation time from 12:26 p.m. to 12:54 p.m..  The patient was placed supine on the angiographic table and the left arm was prepped and covered with a full body drape in the usual sterile fashion.  All operators present for the case performed standard pre-procedural prep including hand washing, sterile gloves, gown, mask, and cap.  All aspects of the maximum sterile barrier technique were followed.  A preprocedural time out was performed with all physicians, technologists, and nurses involved with the procedure.  The shunt was accessed with a micropuncture system pointing toward the venous limb.  Shuntogram and central venograms were obtained.  SHUNTOGRAM:  The shunt was patent.  The axillary, subclavian, innominate vein and SVC were patent. There was a stenosis within the venous outflow at the level of the elbow.  Due to tortuosity proximal to the area of narrowing, a balloon catheter cannot be tracked over the wire to this location.   Catheters were removed and hemostasis was achieved with manual compression.  The patient tolerated the procedure well without any immediate procedural  complication.  CONTRAST:  50 mL of Isovue-300  ESTIMATED BLOOD LOSS: Less than 5cc.  FLUOROSCOPY TIME/DOSE: 5.3  AIR KERMA: 17.91 mGy            CONCLUSION: 1. There is a narrowing within the venous outflow at the level the elbow.  Due to tortuosity proximal to this narrowing, a balloon catheter cannot be advanced into the location in order to angioplasty.  There is good flow through the fistula noted as there are numerous collaterals proximal to this narrowing. 2. After discussion with Dr. Coombs, an attempt for dialysis will be made through the fistula and if unsuccessful a tunneled dialysis catheter will be placed.   PLAN:  Patient will follow-up with nephrology and at dialysis.  LOCATION:  [facility]       Dictated by (CST): Quinn Bernal MD on 4/21/2025 at 12:59 PM     Finalized by (CST): Quinn Bernal MD on 4/21/2025 at 1:15 PM       CT CHEST+ABDOMEN+PELVIS(CPT=71250/87974)  Result Date: 4/20/2025  PROCEDURE:  CT CHEST+ABDOMEN+PELVIS(CPT=71250/85106)  COMPARISON:  [facility], CT, CTA CHEST + CT ABD (W) + CT PEL (W) SH(CPT=71275/75460), 3/29/2025, 4:34 PM.  INDICATIONS:  abnl cxr, SOB, llq pain  TECHNIQUE:  Following oral contrast administration, unenhanced multislice CT scanning is performed through the chest, abdomen, and pelvis.  Dose reduction techniques were used. Dose information is transmitted to the ACR (American College of Radiology) NRDR (National Radiology Data Registry) which includes the Dose Index Registry.  PATIENT STATED HISTORY: (As transcribed by Technologist)  Patient reports shortness of breath, lower left abdominal pain and generalized weakness.    FINDINGS:   CHEST:  Left atrial enlargement.  No pericardial effusion.  Mitral valve prosthesis noted.  Mild coronary calcification.  Normal caliber of the thoracic aorta and pulmonary trunk.  Enlarged lymph nodes of the paratracheal station are stable, index right lower paratracheal node measuring 1.6 cm (series 2, image 45).  Normal thyroid  and esophagus.  No central airway stenosis.  Stable right lung volume loss with areas of partial atelectasis or scar of the right middle and right lower lobes.  Upper lobe predominant emphysema.  Mild mosaic attenuation suggesting mild air trapping.  No acute airspace disease.  No new or enlarging pulmonary nodules.  Small right pleural effusion, similar to prior.  No pneumothorax.  Regional soft tissues are within normal limits.  No fracture or aggressive osseous lesion.  Suture anchors of the left humeral head noted.  ABDOMEN/PELVIS: Normal liver morphology.  Parenchymal hyperdensity of the liver may reflect sequela of amiodarone therapy.  Normal gallbladder, bile ducts, spleen, pancreas, and adrenal glands.  Bilateral renal cortical atrophy.  Subtle hyperdensity of the right distal ureter near the UVJ (series 2, image 234, 235) may represent small ureteral calculi, though there is no evidence of collecting system obstruction.  Normal stomach.  No evidence of bowel inflammation or obstruction.  Normal appendix.  Pancolonic diverticulosis without evidence of acute diverticulitis.  Concentric bladder wall thickening, likely accentuated by under distention.  Normal prostate and seminal vesicles.  No ascites, pneumoperitoneum, or regional lymphadenopathy.  No abdominal aortic aneurysm.  Regional soft tissues are within normal limits.  Osteoarthritis of the hips and spine.  No aggressive osseous lesions.            CONCLUSION:   1. No acute process identified within the chest, abdomen, or pelvis.  2. Scarring and volume loss of the right lung, similar compared to 03/29/2025.  Stable small right pleural effusion.  Stable mediastinal lymphadenopathy.  3. Punctate hyperdensity of the right distal ureter near the UVJ may reflect urolithiasis, though without evidence of collecting system obstruction.  4. Pancolonic diverticulosis without evidence of acute diverticulitis.      LOCATION:  [facility]    Dictated by (CST): Samantha  MD Claude on 4/20/2025 at 0:37 AM     Finalized by (CST): Claude Singh MD on 4/20/2025 at 0:46 AM       XR CHEST AP PORTABLE  (CPT=71045)  Result Date: 4/19/2025  PROCEDURE:  XR CHEST AP PORTABLE  (CPT=71045)  TECHNIQUE:  AP chest radiograph was obtained.  COMPARISON:  [facility], XR, XR CHEST PA + LAT CHEST (VNT=07552), 4/04/2025, 5:25 PM.  INDICATIONS:  shortness of breath, abdominal pain, problem with fistula  PATIENT STATED HISTORY: (As transcribed by Technologist)  Patient has shortness of breath, dry cough, and chest tightness for around 2 weeks. Patient stated tonight he started having left sided abdomen pain.    FINDINGS:             CONCLUSION:  Stable cardiac and mediastinal contours with valvular prosthesis noted.  There continues to be confluent opacification of the right lower lung which likely represents some combination of atelectasis, airspace disease, and pleural effusion.  No appreciable pneumothorax.   LOCATION:  [facility]      Dictated by (CST): Claude Singh MD on 4/19/2025 at 11:02 PM     Finalized by (CST): Claude Singh MD on 4/19/2025 at 11:03 PM          Operative Procedures: Procedure(s) (LRB):  ESOPHAGOGASTRODUODENOSCOPY (EGD) WITH BIOPSIES (N/A)     Code Status: Full Code    Total Time Coordinating Care: Greater than 30 minutes    Patient had opportunity to ask questions and state understand and agree with therapeutic plan as outlined. I reviewed and reconciled current and discharge medications on day of discharge and discussed meds with patient. D/w RN     Yuriy Forrest MD  [facility] Hospitalist  [phone]  5/9/2025  1:00 PM

## 2025-05-09 NOTE — PROGRESS NOTES
[facility]  Progress Note    Godwin Fonseca Patient Status:  Inpatient    1980 MRN [id number]   Location [facility] 3NW-A Attending Yuriy Forrest MD   Hosp Day # 4 PCP Adrian Small MD     Subjective:  No abdominal pain at present.  No bms x . 3 days.    Current Hospital Medications[1]    Past Medical History[2]  Past Surgical History[3]      Objective:  Blood pressure (!) 145/95, pulse 89, temperature 97.8 °F (36.6 °C), temperature source Oral, resp. rate 18, weight 237 lb 7 oz (107.7 kg), SpO2 97%.      EXAM:  BP (!) 145/95   Pulse 89   Temp 97.8 °F (36.6 °C) (Oral)   Resp 18   Wt 237 lb 7 oz (107.7 kg)   SpO2 97%   BMI 30.48 kg/m²   GENERAL: well developed, well nourished, in no apparent distress  HEENT: atraumatic, normocephalic  CHEST: no chest tenderness  LUNGS: clear to auscultation  CARDIO: RRR without murmur  GI: good BS's and no masses, HSM or tenderness  EXTREMITIES: no cyanosis, clubbing or edema      Labs:   Lab Results   Component Value Date    WBC 5.9 2025    HGB 9.0 2025    HCT 26.0 2025    .0 2025    CREATSERUM 10.11 2025    BUN 41 2025     2025    K 3.9 2025     2025    CO2 24.0 2025     2025    CA 8.6 2025    ALB 4.0 2025    PHOS 6.5 2025    PGLU 131 2025     Narrative   PROCEDURE:  MRI ABDOMEN&MRCP W/3D (CPT=74181/92625)     COMPARISON:  [facility], CT, CT CHEST+ABDOMEN+PELVIS(CPT=71250/09927), 2025, 11:47 PM.  [facility] , US, US ABDOMEN LIMITED (CPT=76705), 2025, 6:01 AM.     INDICATIONS:  Abdominal pain, eval CBD     TECHNIQUE:    Multiplanar single shot fast spin echo, chemical shift imaging, breath hold T2 weighted images and 2-D fiesta sequences were acquired. No contrast material was administered. Fluid sensitive imaging with MRCP. Reconstruction was also  performed including 3D multi-projection imaging.  Both projection and source MRCP  images are evaluated.     3-D RENDERING:       PATIENT STATED HISTORY:(As transcribed by Technologist)  eval CBD         FINDINGS:    LIVER:  No enlargement, atrophy, abnormal density, or significant focal lesion.  Diffuse low signal may represent hemosiderosis.    BILIARY:  No visible dilatation or filling defect.    PANCREAS:  No lesion, fluid collection, ductal dilatation, or atrophy.    SPLEEN:  No enlargement or focal lesion.    KIDNEYS:  No mass or obstruction.  Incidental tiny bilateral renal cysts.  ADRENALS:  No mass or enlargement.    AORTA/VASCULAR:  No aneurysm or dissection.    RETROPERITONEUM:  No mass or adenopathy.    BOWEL/MESENTERY:  No visible mass, obstruction, or bowel wall thickening.    ABDOMINAL WALL:  No mass or hernia.    PELVIC NODES:  Normal.  BONES:  No bony lesion or fracture.    LUNG BASES:  No visible pleural disease.  Lung bases not well assessed with MRI.                        Impression   CONCLUSION:    1. No acute abnormality demonstrated MRI of the abdomen and MRCP.  2. Diffuse low signal intensity of the liver correlates with relative high attenuation of liver seen on CT of 4/19/2025.  Differential considerations include hemosiderosis and amiodarone therapy.        LOCATION:  [place]        Dictated by (CST): Ricki Zaragoza MD on 5/09/2025 at 7:34 AM      Finalized by (CST): Ricki Zaragoza MD on 5/09/2025 at 7:42 AM       Assessment:     Upper abdominal pain    EGD yesterday shows mild esophagitis and a hiatal hernia.   MRCP negative for hepatobiliary pathology   Review of ct abdomen and pelvis from 4/15 shows large amount of right colon stool.  Suspect pt has symptoms consistent with irritable bowel syndrome, constipation predominant.   Hx of symptomatic hemorrhoids associated with straining to have bm.    Plan:  1. Increase miralax to 1 dose bid.  2. Stop carafate.  Continue pantoprazole 40 mg daily.  3. Dicyclomine 10 mg po every 6 hours as needed.  4. No further GI work up  recommended.  5. Will sign off.  Please have pt follow up with GI in 4 - 6 weeks.    Ousmane Suarez MD  5/9/2025  8:35 AM         [1]   Current Facility-Administered Medications:     amphetamine-dextroamphetamine (Adderall) tab 10 mg, 10 mg, Oral, BID AC    sodium chloride 0.9 % IV bolus 100 mL, 100 mL, Intravenous, Q30 Min PRN **AND** albumin human (Albumin) 25% injection 25 g, 25 g, Intravenous, PRN Dialysis    sucralfate (Carafate) tab 1 g, 1 g, Oral, TID AC and HS (2200)    heparin (Porcine) 1000 UNIT/ML injection 4,000 Units, 4,000 Units, Intracatheter, PRN Dialysis    cloNIDine (Catapres) tab 0.1 mg, 0.1 mg, Oral, BID    albuterol (Ventolin HFA) 108 (90 Base) MCG/ACT inhaler 2 puff, 2 puff, Inhalation, Q6H PRN    ARIPiprazole (Abilify) tab 15 mg, 15 mg, Oral, Daily    atorvastatin (Lipitor) tab 20 mg, 20 mg, Oral, QPM    buPROPion ER (Wellbutrin XL) 24 hr tab 150 mg, 150 mg, Oral, Daily    calcium acetate (Phoslo) cap 667 mg, 667 mg, Oral, TID with meals    carvedilol (Coreg) tab 25 mg, 25 mg, Oral, BID with meals    FLUoxetine (PROzac) cap 40 mg, 40 mg, Oral, Daily    gabapentin (Neurontin) cap 200 mg, 200 mg, Oral, TID    hydrALAZINE (Apresoline) tab 25 mg, 25 mg, Oral, TID    HYDROcodone-acetaminophen (Norco) 5-325 MG per tab 1 tablet, 1 tablet, Oral, Q6H PRN    lamoTRIgine (LaMICtal) tab 100 mg, 100 mg, Oral, Daily    losartan (Cozaar) tab 100 mg, 100 mg, Oral, Daily    NIFEdipine ER (Procardia-XL) 24 hr tab 30 mg, 30 mg, Oral, Daily    pantoprazole (Protonix) DR tab 40 mg, 40 mg, Oral, QAM AC    sevelamer carbonate (Renvela) tab 2,400 mg, 2,400 mg, Oral, TID CC    tamsulosin (Flomax) cap 0.4 mg, 0.4 mg, Oral, Daily    umeclidinium bromide (Incruse Ellipta) 62.5 MCG/ACT inhaler 1 puff, 1 puff, Inhalation, Daily    heparin (Porcine) 5000 UNIT/ML injection 5,000 Units, 5,000 Units, Subcutaneous, Q8H MARTHA    acetaminophen (Tylenol Extra Strength) tab 500 mg, 500 mg, Oral, Q6H PRN    HYDROmorphone (Dilaudid) 1  MG/ML injection 0.2 mg, 0.2 mg, Intravenous, Q2H PRN **OR** HYDROmorphone (Dilaudid) 1 MG/ML injection 0.4 mg, 0.4 mg, Intravenous, Q2H PRN **OR** HYDROmorphone (Dilaudid) 1 MG/ML injection 0.8 mg, 0.8 mg, Intravenous, Q2H PRN    glucose (Dex4) 15 GM/59ML oral liquid 15 g, 15 g, Oral, Q15 Min PRN **OR** glucose (Glutose) 40% oral gel 15 g, 15 g, Oral, Q15 Min PRN **OR** glucose-vitamin C (Dex-4) chewable tab 4 tablet, 4 tablet, Oral, Q15 Min PRN **OR** dextrose 50% injection 50 mL, 50 mL, Intravenous, Q15 Min PRN **OR** glucose (Dex4) 15 GM/59ML oral liquid 30 g, 30 g, Oral, Q15 Min PRN **OR** glucose (Glutose) 40% oral gel 30 g, 30 g, Oral, Q15 Min PRN **OR** glucose-vitamin C (Dex-4) chewable tab 8 tablet, 8 tablet, Oral, Q15 Min PRN    insulin aspart (NovoLOG) 100 Units/mL FlexPen 1-5 Units, 1-5 Units, Subcutaneous, TID AC and HS  [2]   Past Medical History:   Anxiety state    Arrhythmia    Asthma (HCC)    Attention deficit hyperactivity disorder (ADHD)    Back problem    Bipolar 1 disorder (HCC)    CKD (chronic kidney disease) stage 3, GFR 30-59 ml/min ([facility])    Dr Meeks    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HCC)    Congestive heart disease (HCC)    COPD (chronic obstructive pulmonary disease) (HCC)    Coronary atherosclerosis    Deep vein thrombosis ([facility])    at age 17 R/T cast    Depression    Diabetes ([facility])    Dialysis patient    Diverticulosis of large intestine    Essential hypertension    3/21 echo: severe concentric LVH with normal EF and no MR or pHTN    Extrinsic asthma, unspecified    Heart attack (HCC)    2016- angiogram- no intervention    Heart valve disease    mitral valve repair in 1994/    High blood pressure    High cholesterol    History of blood transfusion    History of mitral valve repair    Hyperlipidemia    Low back pain    tight and stiff after sweeping and mopping    LVH (left ventricular hypertrophy)    Migraines    Mixed hyperlipidemia     HDL 38 LDL 97 VLDL 57     Monoclonal  gammopathy    IgG kappa     Muscle weakness    MVP (mitral valve prolapse)    Repair 1994 at [facility]; echoes as recently as 3/21 show mild or trivial MR and no stenosis    Neuropathy    Osteoarthritis    hip ,knees    Pneumonia due to organism    Pulmonary embolism (HCC)    Renal disorder    Stroke (HCC)    TIA (transient ischemic attack)    Initial history of left-sided weakness and slurred speech. (+) cocaine. MRI of the brain, CT angiogram of the head and neck, and 2D echo are all unremarkable.     TMJ (dislocation of temporomandibular joint)    Troponin level elevated    Trop 60 60 47 with TIA and no CP: Lexiscan negative with EF 51    Visual impairment   [3]   Past Surgical History:  Procedure Laterality Date    Av fistula revision, open Left     Cabg      Colonoscopy N/A 03/26/2023    Procedure: COLONOSCOPY;  Surgeon: Heath Vu MD;  Location:  ENDOSCOPY    Colonoscopy N/A 12/30/2023    Procedure: COLONOSCOPY with cold snare polypectomy and forcep polypectomy;  Surgeon: Ousmane Suarez MD;  Location:  ENDOSCOPY    Colonoscopy N/A 3/9/2025    Procedure: COLONOSCOPY WITH COLD SNARE POLYPECTOMY AND ENDO CLIPS X 2;  Surgeon: Bakari Noguera MD;  Location:  ENDOSCOPY    Colonoscopy & polypectomy  2019    Egd  2019    Duodenitis. Biopsied. EUS for weight loss was negative    Heart surgery      Hernia surgery  08/17/2022    Dr Barnes    Laminectomy,>2 sgmt,lumbar  09/06/2018    L4-L5 Decomp Discectomy ROEM L4-L5    Mitralplasty w cp bypass  1994    [facility]: Repair    Repair rotator cuff,chronic Left     torn and had a ruptured bicep    Sinus surgery        Spine surgery procedure unlisted      Valve repair  1994    mitral valve

## 2025-05-09 NOTE — PROGRESS NOTES
NURSING DISCHARGE NOTE    Discharged Home via Ambulatory.  Accompanied by  self  Belongings Taken by patient/family.    Discharge education provided. Patient left unit at 1415

## 2025-05-09 NOTE — PROGRESS NOTES
CC: follow-up hospital admission abd pain    SUBJECTIVE:  Interval History:     Still c pain. Taking PO ok. Discussed plan and findings.     OBJECTIVE:  Scheduled Meds: Scheduled Medications[1]  Continuous Infusions: Medication Infusions[2]  PRN Meds: PRN Medications[3]    PHYSICAL EXAM  Vital signs: Temp:  [97.3 °F (36.3 °C)-98.8 °F (37.1 °C)] 98.2 °F (36.8 °C)  Pulse:  [67-89] 83  Resp:  [10-18] 18  BP: ()/(54-87) 133/87  SpO2:  [92 %-100 %] 95 %      GENERAL - NAD, AAO  EYES- sclera anicteric   HENT- normocephalic,   NECK - no JVD  CV- RRR  RESP - CTAB, normal resp effort  ABDOMEN- soft, mild ttp in upper abd  EXT- no LE edema        Data Review:   Labs:   Recent Labs   Lab 05/05/25 0627 05/06/25  0540   WBC 7.4 5.9   HGB 9.0* 8.6*   MCV 93.2 91.8   .0 209.0       Recent Labs   Lab 05/05/25 0627 05/06/25  0540 05/07/25  0620 05/08/25  0625    138  138 133* 138   K 4.3 4.1  4.1 4.2 3.7    101  101 100 99   CO2 22.0 25.0  25.0 18.0* 25.0   BUN 66* 36*  36* 33* 29*   CREATSERUM 12.21* 8.84*  8.84* 9.98* 8.05*   CA 8.2* 8.7  8.7 8.5* 8.8   MG  --  1.8  --   --    PHOS  --  5.5* 6.6* 6.5*   * 93  93 101* 91       Recent Labs   Lab 05/05/25  0627 05/06/25  0540 05/07/25  0620 05/08/25  0625   ALT 28 17  --   --    AST 32 26  --   --    ALB 4.1 4.3  4.3 3.8 3.9       Recent Labs   Lab 05/07/25  2216 05/08/25  0606 05/08/25  1131 05/08/25  1537 05/08/25  2130   PGLU 142* 151* 162* 142* 90       EGD  LA grade A esophagitis with scattered erosions at the GE junction.  3 cm hiatal hernia   Normal stomach.  Biopsies were done to assess for h pylori infection.   Normal duodenum.   Biopsies were done to assess for celiac disease.        ASSESSMENT/PLAN:  Patient is a 45 year old male with PMH sig for HTN, CHF, ESRD on HD, DM, bipolar do, pavan, here for abd pain     Impression     -N/V/D  -abd pain  -elevated lipase      -ESRD on HD   -secondary hyperparathyroidism     -chronic  HFpEF  -HTN, uncontrolled  -hx of MV repair     -DM 2     -MDD  -HILARIO  -Bipolar      Plan     *GI  HIATAL HERNIA, MILD ESOPHAGITIS    -appears viral GE like symptoms  -check stool cx - no stools  -has upper abd pain - will check RUQ to eval GB. Mild lipase elevation but doubt pancreatitis - has had elevation previously - GB with sludge, borderline CBD  -will start on diet when sxs improving  - still c worsening pain check MRCP without contrast   - c/s GI as has chronic sxs; d/w [facility] and Dr. Bond  - EGD noted; Sucralfate 1 gm po tid.        *CV  -on multiple bp meds at home, cont      *Endo  -iss     *renal  -HD MWF    -US of LUE fistula -->thrombus measures 4.2 cm in the venous outflow tract versus 2.6 cm on prior exam.   - VASCULAR notified, likely will need new fistula      Scds  Heparin      Will continue to follow while hospitalized. Please page me or the on-call hospitalist with questions or concerns. D/w RN     Yuriy Forrest MD  [facility] Hospitalist  [phone]  5/8/2025  10:35 PM       [1]    amphetamine-dextroamphetamine  10 mg Oral BID AC    sucralfate  1 g Oral TID AC and HS (2200)    cloNIDine  0.1 mg Oral BID    ARIPiprazole  15 mg Oral Daily    atorvastatin  20 mg Oral QPM    buPROPion ER  150 mg Oral Daily    calcium acetate  667 mg Oral TID with meals    carvedilol  25 mg Oral BID with meals    FLUoxetine HCl  40 mg Oral Daily    gabapentin  200 mg Oral TID    hydrALAZINE  25 mg Oral TID    lamoTRIgine  100 mg Oral Daily    losartan  100 mg Oral Daily    NIFEdipine ER  30 mg Oral Daily    pantoprazole  40 mg Oral QAM AC    sevelamer carbonate  2,400 mg Oral TID CC    tamsulosin  0.4 mg Oral Daily    umeclidinium bromide  1 puff Inhalation Daily    heparin  5,000 Units Subcutaneous Q8H MARTHA    insulin aspart  1-5 Units Subcutaneous TID AC and HS   [2] [3]   sodium chloride **AND** albumin human    heparin    albuterol    HYDROcodone-acetaminophen    acetaminophen    HYDROmorphone  **OR** HYDROmorphone **OR** HYDROmorphone    glucose **OR** glucose **OR** glucose-vitamin C **OR** dextrose **OR** glucose **OR** glucose **OR** glucose-vitamin C

## 2025-05-09 NOTE — PROGRESS NOTES
Patient is A&O x 4. Vital signs stable. Afebrile. On room air with .   NSR on tele. Patient refused Heparin injection tonight.   NPO for MRI/MRCP tonight. Unable to give oral medications tonight given NPO.   Patient is scheduled to have Hemodialysis tomorrow.  Hemodialysis PermaCath - intact.  Left arm Fistula slightly swollen. Left arm precautions in place.  Patient c/o abdominal pain. IV Dilaudid given per MAR with good relief.  Patient voided tonight - UA sent.  Ambulating independently.  Plan of care discussed with patient and he voiced understanding. All current needs met. Call light in reach.

## 2025-05-09 NOTE — PROGRESS NOTES
[facility]   part of [facility]    Nephrology Progress Note    Godwin Fonseca Attending:  Yuriy Forrest MD       SUBJECTIVE:     Seen on HD.  Tolerating treatment without issue.    PHYSICAL EXAM:     Vital Signs: /86 (BP Location: Right arm)   Pulse 97   Temp 98.4 °F (36.9 °C) (Oral)   Resp 18   Wt 237 lb 7 oz (107.7 kg)   SpO2 97%   BMI 30.48 kg/m²   Temp (24hrs), Av.1 °F (36.7 °C), Min:97.8 °F (36.6 °C), Max:98.5 °F (36.9 °C)       Intake/Output Summary (Last 24 hours) at 2025 1232  Last data filed at 2025 1150  Gross per 24 hour   Intake 1060 ml   Output 2400 ml   Net -1340 ml     Wt Readings from Last 3 Encounters:   25 237 lb 7 oz (107.7 kg)   25 237 lb 7 oz (107.7 kg)   25 237 lb 6.4 oz (107.7 kg)     Gen - laying in bed, nad  HEENT - NCAT  CV - RRR  Resp - CTAB  Abd - soft   - no lobo  Ext - warm no leg swelling  Vascular - LIJ tunnelled HD catheter, LUE AVF + thrill and bruit with distal small aneurysmal dilation    LABORATORY DATA:     Lab Results   Component Value Date     (H) 2025    BUN 41 (H) 2025    BUNCREA 13.0 2022    CREATSERUM 10.11 (H) 2025    ANIONGAP 11 2025    GFR 59 (L) 2018    GFRNAA 30 (L) 2022    GFRAA 35 (L) 2022    CA 8.6 (L) 2025    OSMOCALC 297 (H) 2025    ALKPHO 130 (H) 2025    AST 26 2025    ALT 17 2025    BILT 0.5 2025    TP 7.5 2025    ALB 4.0 2025    GLOBULIN 3.2 2025     2025    K 3.9 2025     2025    CO2 24.0 2025     Lab Results   Component Value Date    WBC 5.9 2025    RBC 2.80 (L) 2025    HGB 9.0 (L) 2025    HCT 26.0 (L) 2025    .0 2025    MPV 11.5 2012    MCV 92.9 2025    MCH 32.1 2025    MCHC 34.6 2025    RDW 13.9 2025    NEPRELIM 5.14 2025    NEPERCENT 69.3 2025    LYPERCENT 15.7 2025     MOPERCENT 10.0 05/05/2025    EOPERCENT 3.5 05/05/2025    BAPERCENT 1.2 05/05/2025    NE 5.14 05/05/2025    LYMABS 1.16 05/05/2025    MOABSO 0.74 05/05/2025    EOABSO 0.26 05/05/2025    BAABSO 0.09 05/05/2025     Lab Results   Component Value Date    MALBP 167.00 05/24/2023    CREUR 142.00 05/24/2023    CREUR 142.00 05/24/2023     Lab Results   Component Value Date    COLORUR Light-Yellow 05/08/2025    CLARITY Clear 05/08/2025    SPECGRAVITY 1.011 05/08/2025    GLUUR Normal 05/08/2025    BILUR Negative 05/08/2025    KETUR Negative 05/08/2025    BLOODURINE Negative 05/08/2025    PHURINE 7.0 05/08/2025    PROUR 100 (A) 05/08/2025    UROBILINOGEN Normal 05/08/2025    NITRITE Negative 05/08/2025    LEUUR Negative 05/08/2025    WBCUR 1-5 05/08/2025    RBCUR 3-5 (A) 05/08/2025    EPIUR None Seen 05/08/2025    BACUR None Seen 05/08/2025    CAOXUR Occasional (A) 04/20/2018    HYLUR Present (A) 05/14/2019         IMAGING:     Reviewed.    MEDICATIONS:       Current Hospital Medications[1]    ASSESSMENT/PLAN:     44 yo M with history of ESRD on iHD MWF via LUE AVF, HTN, severe MR s/p MVR x 2, HFpEF, DVT/PE, TIA, MGUS, bipolar disorder, recurrent GI bleed, diffuse body pains presents with n/v/abdominal pain.     ESRD:  -- HD MWF per outpatient schedule  -- avoid morphine  -- gabapentin should be renally dosed     Anemia:  -- epo 5/7     MBD:  -- trend phos  -- sevelamer 2400 TID AC  -- calcium acetate 667 TIDAC  -- low phos diet     HTN:  -- clonidine should be continued BID to avoid rebound  -- takes remaining antihypertensives after HD but ok to hold hydralazine if there is a concern for dropping too much BP after HD; hold antihypertensives (except clonidine per above) pre HD for now since he has dropped BP with UF in the past here     LUE AVF with arm swelling:  -- L arm precautions  -- US duplex dialysis access with thrombus - discussed with vascular surgery - he has a failing fistula; no further eval/intervention planned  for current fistula; patient will need outpatient follow up with vascular surgery for new AVF eval    D/w primary service. Ok to discharge after HD today.        Thank you for allowing me to participate in the care of this patient. Please do not hesitate to call with questions or concerns.        MD Kevin Villafana Nephrology         [1]   Current Facility-Administered Medications   Medication Dose Route Frequency    dicyclomine (Bentyl) cap 10 mg  10 mg Oral TID PRN    polyethylene glycol (PEG 3350) (Miralax) 17 g oral packet 17 g  17 g Oral BID    fluticasone propionate (Flonase) 50 MCG/ACT nasal suspension 1 spray  1 spray Each Nare Daily    amphetamine-dextroamphetamine (Adderall) tab 10 mg  10 mg Oral BID AC    sodium chloride 0.9 % IV bolus 100 mL  100 mL Intravenous Q30 Min PRN    And    albumin human (Albumin) 25% injection 25 g  25 g Intravenous PRN Dialysis    heparin (Porcine) 1000 UNIT/ML injection 4,000 Units  4,000 Units Intracatheter PRN Dialysis    cloNIDine (Catapres) tab 0.1 mg  0.1 mg Oral BID    albuterol (Ventolin HFA) 108 (90 Base) MCG/ACT inhaler 2 puff  2 puff Inhalation Q6H PRN    ARIPiprazole (Abilify) tab 15 mg  15 mg Oral Daily    atorvastatin (Lipitor) tab 20 mg  20 mg Oral QPM    buPROPion ER (Wellbutrin XL) 24 hr tab 150 mg  150 mg Oral Daily    calcium acetate (Phoslo) cap 667 mg  667 mg Oral TID with meals    carvedilol (Coreg) tab 25 mg  25 mg Oral BID with meals    FLUoxetine (PROzac) cap 40 mg  40 mg Oral Daily    gabapentin (Neurontin) cap 200 mg  200 mg Oral TID    hydrALAZINE (Apresoline) tab 25 mg  25 mg Oral TID    HYDROcodone-acetaminophen (Norco) 5-325 MG per tab 1 tablet  1 tablet Oral Q6H PRN    lamoTRIgine (LaMICtal) tab 100 mg  100 mg Oral Daily    losartan (Cozaar) tab 100 mg  100 mg Oral Daily    NIFEdipine ER (Procardia-XL) 24 hr tab 30 mg  30 mg Oral Daily    pantoprazole (Protonix) DR tab 40 mg  40 mg Oral QAM AC    sevelamer carbonate (Renvela) tab 2,400 mg   2,400 mg Oral TID CC    tamsulosin (Flomax) cap 0.4 mg  0.4 mg Oral Daily    umeclidinium bromide (Incruse Ellipta) 62.5 MCG/ACT inhaler 1 puff  1 puff Inhalation Daily    heparin (Porcine) 5000 UNIT/ML injection 5,000 Units  5,000 Units Subcutaneous Q8H MARTHA    acetaminophen (Tylenol Extra Strength) tab 500 mg  500 mg Oral Q6H PRN    HYDROmorphone (Dilaudid) 1 MG/ML injection 0.2 mg  0.2 mg Intravenous Q2H PRN    Or    HYDROmorphone (Dilaudid) 1 MG/ML injection 0.4 mg  0.4 mg Intravenous Q2H PRN    Or    HYDROmorphone (Dilaudid) 1 MG/ML injection 0.8 mg  0.8 mg Intravenous Q2H PRN    glucose (Dex4) 15 GM/59ML oral liquid 15 g  15 g Oral Q15 Min PRN    Or    glucose (Glutose) 40% oral gel 15 g  15 g Oral Q15 Min PRN    Or    glucose-vitamin C (Dex-4) chewable tab 4 tablet  4 tablet Oral Q15 Min PRN    Or    dextrose 50% injection 50 mL  50 mL Intravenous Q15 Min PRN    Or    glucose (Dex4) 15 GM/59ML oral liquid 30 g  30 g Oral Q15 Min PRN    Or    glucose (Glutose) 40% oral gel 30 g  30 g Oral Q15 Min PRN    Or    glucose-vitamin C (Dex-4) chewable tab 8 tablet  8 tablet Oral Q15 Min PRN    insulin aspart (NovoLOG) 100 Units/mL FlexPen 1-5 Units  1-5 Units Subcutaneous TID AC and HS

## 2025-05-09 NOTE — PROGRESS NOTES
Alert & oriented x4. VSS on room air. NSR on tele. Oliguric related to HD. Tolerating regular/sodium-restricted diet. Ambulates independently. Denies nausea/chest pain/SOB. Pain controlled with PRN dilaudid. HD perm-cath intact. Left arm fistula slightly swollen. Received dialysis today. Patient updated on plan of care. Questions and concerns addressed. Frequent rounding performed

## 2025-05-12 NOTE — PAYOR COMM NOTE
--------------  DISCHARGE REVIEW    Payor: [employer]  Subscriber #:  [id number]  Authorization Number: [id number]    Admit date: 5/5/25  Admit time:  10:59 AM  Discharge Date: 5/9/2025  2:18 PM     Admitting Physician: Yuriy Forrest MD  Primary Care Physician: Adrian Arce MD    Discharge Summary signed by Yuriy Forrest MD at 5/9/2025  3:04 PM       Author: Yuriy Forrest MD Specialty: HOSPITALIST, Internal Medicine Author Type: Physician    Filed: 5/9/2025  3:04 PM Date of Service: 5/9/2025  1:00 PM Status: Signed     [facility] [place] [facility] Discharge Summary   Patient ID:  [id number]  45 year old  4/12/1980    Admit date: 5/5/2025  Discharge date and time: 5/9/2025   Attending Physician: Yuriy Forrest MD   Primary Care Physician: Adrian Small MD     Admit Dx: ESRD (end stage renal disease) (HCC) [N18.6]  Nausea vomiting and diarrhea [R11.2, R19.7]  Intractable abdominal pain [R10.9]    Hospital Discharge Diagnoses:  abd pain/ESRD    Disposition: home  Important follow up:  -PCP in [x] within 7 days [] within 14 days [] other   Ousmane Suarez MD  [address]  [address]  [facility] [address] [phone]    Follow up in 4 week(s)    Hospital Course:   45 year old male with PMH sig for HTN, CHF, ESRD on HD, DM, here for abd pain     Impression  -N/V/D  -abd pain  -elevated lipase      -ESRD on HD   -secondary hyperparathyroidism     -chronic HFpEF  -HTN, uncontrolled  -hx of MV repair     -DM 2    Plan  *GI  HIATAL HERNIA, MILD ESOPHAGITIS    -appears viral GE like symptoms  -also likely undering IBS  -check stool cx - no stools  -has upper abd pain - RUQ ultrasound to eval GB. Mild lipase elevation but doubt pancreatitis - has had elevation previously - GB with sludge, borderline CBD  -started on diet, sxs improving  - MRCP without abd pathology; f/u findings in liver as o/p   - c/s GI as has chronic sxs; d/w [facility]   - EGD 5/8 c mild esophagitis and a  hiatal hernia. PPI and bentyl prn   - sxs seem better today       *CV  -on multiple bp meds at home, cont      *Endo  -iss     *renal  -HD MWF    -US of LUE fistula -->thrombus measures 4.2 cm in the venous outflow tract versus 2.6 cm on prior exam.   - VASCULAR notified, likely will need new fistula as o/p   - d/w Dr. Bond  - ISHAAN arm precautions     Scds  Heparin     Day of discharge Exam   Vitals:    05/09/25 1242   BP: 122/84   Pulse: 96   Resp:    Temp:        Exam on day of discharge:  Taking PO ok. Abd pain ok    Gen: No acute distress, alert and oriented  CV: RRR, +s1/s2  Lungs: CTAB, good respiratory effort  Abdomen: s/nt/nd  Ext: Moves all 4 extremities, no c/c/e. LUE fistula  Neuro: CN Intact, no focal deficits    Discharge meds  START taking these medications      dicyclomine 10 MG Caps  Commonly known as: Bentyl  Take 1 capsule (10 mg total) by mouth 3 (three) times daily as needed.            CONTINUE taking these medications      albuterol 108 (90 Base) MCG/ACT Aers  Commonly known as: Ventolin HFA     atorvastatin 20 MG Tabs  Commonly known as: Lipitor     benzonatate 200 MG Caps  Commonly known as: Tessalon  Take 1 capsule (200 mg total) by mouth 3 (three) times daily as needed for cough.     calcium acetate 667 MG Caps  Commonly known as: Phoslo  Take 1 capsule (667 mg total) by mouth with breakfast, with lunch, and with evening meal.     carvedilol 25 MG Tabs  Commonly known as: Coreg  Take 1 tablet (25 mg total) by mouth in the morning and 1 tablet (25 mg total) in the evening. Take with meals.     cloNIDine 0.1 MG Tabs  Commonly known as: Catapres  Take 1 tablet (0.1 mg total) by mouth 2 (two) times daily.     cyclobenzaprine 5 MG Tabs  Commonly known as: Flexeril     ergocalciferol 1.25 MG (91781 UT) Caps  Commonly known as: Vitamin D2     ferrous sulfate 325 (65 FE) MG Tbec     fluticasone propionate 50 MCG/ACT Susp  Commonly known as: Flonase  SPRAY ONCE INTO EACH NOSTRIL BID PRN     gabapentin  100 MG Caps  Commonly known as: Neurontin  Take 2 capsules (200 mg total) by mouth 3 (three) times daily.     hydrALAZINE 25 MG Tabs  Commonly known as: Apresoline     HYDROcodone-acetaminophen 5-325 MG Tabs  Commonly known as: Norco     lamoTRIgine 100 MG Tabs  Commonly known as: LaMICtal     losartan 100 MG Tabs  Commonly known as: Cozaar     NIFEdipine ER 30 MG Tb24  Commonly known as: Adalat CC  Take 1 tablet (30 mg total) by mouth daily.     ondansetron 4 MG Tbdp  Commonly known as: Zofran-ODT     pantoprazole 40 MG Tbec  Commonly known as: Protonix     Polyethylene Glycol 3350 17 g Pack  Commonly known as: MIRALAX     sevelamer carbonate 800 MG Tabs  Commonly known as: Renvela  Take 3 tablets (2,400 mg total) by mouth 3 (three) times daily with meals.     tamsulosin 0.4 MG Caps  Commonly known as: Flomax     tiotropium 18 MCG Caps  Commonly known as: Spiriva Handihaler     Vyvanse 60 MG Caps  Generic drug: Lisdexamfetamine Dimesylate               Consults: IP CONSULT TO HOSPITALIST  IP CONSULT TO HOSPITALIST  IP CONSULT TO NEPHROLOGY  IP CONSULT TO GASTROENTEROLOGY  IP CONSULT TO VASCULAR SURGERY  Operative Procedures: Procedure(s) (LRB):  ESOPHAGOGASTRODUODENOSCOPY (EGD) WITH BIOPSIES (N/A)     Code Status: Full Code    Yuriy Forrest MD  5/9/2025  1:00 PM      REVIEWER COMMENTS    FOR FINAL REVIEW/APPROVAL OF ALL INPT DAYS

## 2025-05-12 NOTE — PAYOR COMM NOTE
--------------  CONTINUED STAY REVIEW    Payor: [employer]  Subscriber #:  [id number]  Authorization Number: [id number]    Admit date: 5/5/25  Admit time: 10:59 AM    REVIEW DOCUMENTATION:  5/7/2025  GASTROENTEROLOGY CONSULTATION   Reason for Consultation:  Abdominal pain  Nausea and vomiting     History of Present Illness:  45 year old male with ESRD on HD, HTN, MR s/p MVR, DVT / PE, TIA,  recurrent GI bleed.  GI consult requested for 1 day of n/v/diarrhea and abdominal pain.  Pt missed HD.  Pt describes 1 year of upper abdominal pain, episodic, last minutes to an hour, burning, associated with nausea and vomiting about 2 -3 days per week                On admission, lipase 123.  Normal liver function tests.  US abd showed mild GB sludge and cbd 7 mm.  MRCP ordered.  Colonoscopy 3/9/25 for rectal bleeding - diverticulosis and small polyps.  Normal VCE 10/2024.  Umbilical hernia repaired 8/2022    Component Value Date     CREATSERUM 9.98 05/07/2025     BUN 33 05/07/2025      05/07/2025     K 4.2 05/07/2025      05/07/2025     CO2 18.0 05/07/2025      05/07/2025     CA 8.5 05/07/2025     ALB 3.8 05/07/2025     PHOS 6.6 05/07/2025     PGLU 122 05/07/2025     Assessment:     Nausea and vomiting  Abdominal pain                Pt noted to have GB sludge with 7 mm cbd.  MRCP ordered for further evaluation.  Abdominal pain may represent biliary colic.  Will evaluate for PUD, GERD, gastritis.      Plan:  1. Agree with MRCP.  2. EGD with MAC tomorrow.  3. Continue pantoprazole 40 mg daily.    5/8/2025  Operative Report   PREOPERATIVE DIAGNOSIS             Upper abdominal pain   POSTOPERATIVE DIAGNOSIS:  LA grade A esophagitis with scattered erosions at the GE junction.  3 cm hiatal hernia   Normal stomach.  Biopsies were done to assess for h pylori infection.   Normal duodenum.   Biopsies were done to assess for celiac disease.   PROCEDURE PERFORMED:                Esophagogastroduodenoscopy  with biopsies   RECOMMENDATIONS    General diet.  The patient will be notified with biopsy results in 2 - 4 working days.   Pantoprazole 40 mg daily.  Sucralfate 1 gm po tid.  MRCP pending.    5/9/2025  Nephrology Progress Note   Duplex AVF with venous thrombus.  Underwent EGD today.  Tolerating PO now.  Plan for MRCP noted.    5/8/2025 1114      Gross per 24 hour   Intake 1300 ml   Output 0 ml   Net 1300 ml       Lab 05/05/25  0627 05/06/25  0540 05/07/25  0620 05/08/25  0625    138  138 133* 138   K 4.3 4.1  4.1 4.2 3.7    101  101 100 99   CO2 22.0 25.0  25.0 18.0* 25.0   BUN 66* 36*  36* 33* 29*   CREATSERUM 12.21* 8.84*  8.84* 9.98* 8.05*   CA 8.2* 8.7  8.7 8.5* 8.8   MG  --  1.8  --   --    PHOS  --  5.5* 6.6* 6.5*   * 93  93 101* 91      ALT 28 17  --   --    AST 32 26  --   --    ALB 4.1 4.3  4.3 3.8 3.9      Lab 05/07/25  2216 05/08/25  0606 05/08/25  1131 05/08/25  1537 05/08/25  2130   PGLU 142* 151* 162* 142* 90     ASSESSMENT/PLAN:   ESRD:  -- HD MWF per outpatient schedule but if receiving gadolinium will need dialysis immediately after then 2 sessions consecutively  -- avoid morphine  -- gabapentin should be renally dosed     Anemia:  -- epo 5/7     MBD:  -- trend phos  -- sevelamer 2400 TID AC  -- calcium acetate 667 TIDAC  -- low phos diet     HTN:  -- clonidine should be continued BID to avoid rebound  -- takes remaining antihypertensives after HD but ok to hold hydralazine if there is a concern for dropping too much BP after HD; hold pre HD for now since he has dropped BP with UF in the past here     LUE AVF with arm swelling:  -- L arm precautions  -- US duplex dialysis access with thrombus  -- vascular surgery consult  -- consideration for anticoagulation to be balanced with prior GI bleeding; defer to primary/vascular     5/9/2025  Nephrology Progress Note   Tolerating treatment without issue     5/9/2025 1150      Gross per 24 hour   Intake 1060 ml   Output 2400 ml    Net -1340 ml       ASSESSMENT/PLAN:   ESRD:  -- HD MWF per outpatient schedule  -- avoid morphine  -- gabapentin should be renally dosed     Anemia:  -- epo 5/7     MBD:  -- trend phos  -- sevelamer 2400 TID AC  -- calcium acetate 667 TIDAC  -- low phos diet     HTN:  -- clonidine should be continued BID to avoid rebound  -- takes remaining antihypertensives after HD but ok to hold hydralazine if there is a concern for dropping too much BP after HD; hold antihypertensives (except clonidine per above) pre HD for now since he has dropped BP with UF in the past here     LUE AVF with arm swelling:  -- L arm precautions  -- US duplex dialysis access with thrombus - discussed with vascular surgery - he has a failing fistula; no further eval/intervention planned for current fistula; patient will need outpatient follow up with vascular surgery for new AVF eval     D/w primary service. Ok to discharge after HD today.        GI Progress Note    No abdominal pain at present. No bms x . 3 days.     Component Value Date     WBC 5.9 05/09/2025     HGB 9.0 05/09/2025     HCT 26.0 05/09/2025     .0 05/09/2025     CREATSERUM 10.11 05/09/2025     BUN 41 05/09/2025      05/09/2025     K 3.9 05/09/2025      05/09/2025     CO2 24.0 05/09/2025      05/09/2025     CA 8.6 05/09/2025     ALB 4.0 05/09/2025     PHOS 6.5 05/09/2025     PGLU 131 05/09/2025     Assessment:     Upper abdominal pain                EGD yesterday shows mild esophagitis and a hiatal hernia.               MRCP negative for hepatobiliary pathology               Review of ct abdomen and pelvis from 4/15 shows large amount of right colon stool.  Suspect pt has symptoms consistent with irritable bowel syndrome, constipation predominant.   Hx of symptomatic hemorrhoids associated with straining to have bm.     Plan:  1. Increase miralax to 1 dose bid.  2. Stop carafate.  Continue pantoprazole 40 mg daily.  3. Dicyclomine 10 mg po every 6 hours  as needed.  4. No further GI work up recommended.  5. Will sign off.  Please have pt follow up with GI in 4 - 6 weeks.    MEDICATIONS ADMINISTERED:  Medications 05/07/25 05/08/25 05/09/25   amphetamine-dextroamphetamine (Adderall) tab 10 mg  Dose: 10 mg  Freq: 2 times daily before meals Route: OR  Start: 05/08/25 1700 End: 05/09/25 1618   Admin Instructions:   To avoid insomnia, late evening doses should be avoided.     1552 AG-Given        0628 JQ-Given   1618-D/C'd       ARIPiprazole (Abilify) tab 15 mg  Dose: 15 mg  Freq: Daily Route: OR  Start: 05/06/25 0930 End: 05/09/25 1618    1220 DL-Given [C]        0840 KZ-Given        1241 AG-Given   1618-D/C'd       atorvastatin (Lipitor) tab 20 mg  Dose: 20 mg  Freq: Every evening Route: OR  Start: 05/05/25 1800 End: 05/09/25 1618    2014 DG-Given        1749 AG-Given        1618-D/C'd        buPROPion ER (Wellbutrin XL) 24 hr tab 150 mg  Dose: 150 mg  Freq: Daily Route: OR  Start: 05/06/25 0930 End: 05/09/25 1618   Admin Instructions:   DO NOT CRUSH, SPLIT, OR CHEW TABLET    1226 DL-Given [C]        (0930 AG)-Not Given        0930 AG-Given   1618-D/C'd       calcium acetate (Phoslo) cap 667 mg  Dose: 667 mg  Freq: 3 times daily with meals Route: OR  Start: 05/05/25 1215 End: 05/09/25 1618    (0800 DL)-Not Given [C]   1224 DL-Given   2206 DG-Given      (0800 KZ)-Not Given   1141 AG-Given   1551 AG-Given     (1800 AG)-Not Given [C]        (0800 AG)-Not Given   1027 AG-Given   1618-D/C'd      carvedilol (Coreg) tab 25 mg  Dose: 25 mg  Freq: 2 times daily with meals Route: OR  Start: 05/05/25 1800 End: 05/09/25 1618   Admin Instructions:   Hold pre HD    If the patient is not getting hemodialysis, hold for Systolic Blood Pressure less than 120, give if Systolic Blood Pressure is greater than 120.     1231 DL-Given   (2206 DG)-Not Given       0841 KZ-Given   (1750 AG)-Not Given       (0800 AG)-Not Given   1618-D/C'd       cloNIDine (Catapres) tab 0.1 mg  Dose: 0.1 mg  Freq:  2 times daily Route: OR  Start: 05/05/25 1145 End: 05/09/25 1618    (1231 DL)-Not Given [C]   (2205 DG)-Not Given       0841 KZ-Given   (2100 JQ)-Not Given       1027 AG-Given   1618-D/C'd       epoetin sylvia (Epogen, Procrit) 90488 UNIT/ML injection 10,000 Units  Dose: 10,000 Units  Freq: Once Route: IV  Start: 05/07/25 1300 End: 05/07/25 1411   Admin Instructions:   With HD  Keep refrigerated   Order specific questions:       1411 DL-Given            epoetin sylvia (Epogen, Procrit) 74448 UNIT/ML injection 10,000 Units  Dose: 10,000 Units  Freq: Once Route: IV  Start: 05/07/25 1500 End: 05/07/25 1300   Admin Instructions:   With HD  Keep refrigerated   Order specific questions:       1300-D/C'd          FLUoxetine (PROzac) cap 40 mg  Dose: 40 mg  Freq: Daily Route: OR  Start: 05/06/25 0930 End: 05/09/25 1618    1220 DL-Given [C]        0841 KZ-Given        1027 AG-Given   1618-D/C'd       fluticasone propionate (Flonase) 50 MCG/ACT nasal suspension 1 spray  Dose: 1 spray  Freq: Daily Route: Each Nare  Start: 05/09/25 1030 End: 05/09/25 1618      1154 AG-Given   1618-D/C'd       gabapentin (Neurontin) cap 200 mg  Dose: 200 mg  Freq: 3 times daily Route: OR  Start: 05/05/25 1600 End: 05/09/25 1618    1230 DL-Given   (1800 DL)-Not Given   2205 DG-Given      0841 KZ-Given   1552 AG-Given   (2100 JQ)-Not Given      1028 AG-Given      1618-D/C'd      heparin (Porcine) 5000 UNIT/ML injection 5,000 Units  Dose: 5,000 Units  Freq: Every 8 hours scheduled Route: SC  Start: 05/05/25 1400 End: 05/09/25 1618    0510 LQ-Given   1651 DL-Given   2217 DG-Given      (0600 RM)-Not Given   1500 AG-Given   (2200 JQ)-Not Given      0626 JQ-Given      1618-D/C'd      hydrALAZINE (Apresoline) tab 25 mg  Dose: 25 mg  Freq: 3 times daily Route: OR  Indications of Use: HYPERTENSION  Start: 05/05/25 1600 End: 05/09/25 1618   Admin Instructions:   Hold pre HD    (1253 DL)-Not Given [C]   1649 DL-Given   (2205 DG)-Not Given      0840 KZ-Given    1552 AG-Given   (2100 JQ)-Not Given [C]      (0900 AG)-Not Given      1618-D/C'd      insulin aspart (NovoLOG) 100 Units/mL FlexPen 1-5 Units  Dose: 1-5 Units  Freq: 3 times daily before meals and nightly Route: SC  Start: 05/05/25 1600 End: 05/09/25 1618   Admin Instructions:   CORRECTION FACTOR - COLUMN LOW DOSE.  Continue to give correction insulin (Novolog/aspart) even if NPO; DO NOT HOLD OR ALTER INSULIN DOSE WITHOUT A PHYSICIAN ORDER.  1 unit of Novolog/aspart insulin is expected to decrease blood glucose by 40 mg/dL.    Give 1 unit if blood glucose 141-180 mg/dL  Give 2 units if blood glucose 181-220 mg/dL  Give 3 units if blood glucose 221-260 mg/dL  Give 4 units if blood glucose 261-300 mg/dL  Give 5 units if blood glucose 301-350 mg/dL  Call Physician if blood glucose is greater than 351 mg/dL with time and last dose of correction insulin given.    (0700 LQ)-Not Given   (1231 DL)-Not Given   (1700 DL)-Not Given     (2100 DG)-Not Given [C]        (0700 DG)-Not Given   1140 AG-Given   1557 AG-Given     (2200 JQ)-Not Given        (0638 JQ)-Not Given   (1100 AG)-Not Given        1618-D/C'd        lamoTRIgine (LaMICtal) tab 100 mg  Dose: 100 mg  Freq: Daily Route: OR  Start: 05/06/25 0930 End: 05/09/25 1618    1222 DL-Given [C]        0841 KZ-Given        1243 AG-Given   1618-D/C'd       losartan (Cozaar) tab 100 mg  Dose: 100 mg  Freq: Daily Route: OR  Start: 05/06/25 0930 End: 05/09/25 1618   Admin Instructions:   Hold pre HD    1221 DL-Given [C]        0841 KZ-Given        1241 AG-Given   1618-D/C'd       NIFEdipine ER (Procardia-XL) 24 hr tab 30 mg  Dose: 30 mg  Freq: Daily Route: OR  Start: 05/06/25 0930 End: 05/09/25 1618   Admin Instructions:   Hold pre HD  Do not crush    1222 DL-Given [C]        0841 KZ-Given        1241 AG-Given   1618-D/C'd       ondansetron (Zofran) 4 MG/2ML injection 4 mg  Dose: 4 mg  Freq: Once Route: IV  Start: 05/05/25 0629 End: 05/05/25 0638         pantoprazole (Protonix)   tab 40 mg  Dose: 40 mg  Freq: Every morning before breakfast Route: OR  Start: 05/06/25 0700 End: 05/09/25 1618   Admin Instructions:   Do not crush    0510 LQ-Given        0654 RM-Given        0629 JQ-Given   1618-D/C'd       polyethylene glycol (PEG 3350) (Miralax) 17 g oral packet 17 g  Dose: 17 g  Freq: 2 times daily Route: OR  Start: 05/09/25 1000 End: 05/09/25 1618   Admin Instructions:   1 packet=17gm=1 heaping tablespoon; Dissolve in 8 oz of water      1027 AG-Given   1618-D/C'd       sevelamer carbonate (Renvela) tab 2,400 mg  Dose: 2,400 mg  Freq: 3 times daily with meals Route: OR  Start: 05/05/25 1215 End: 05/09/25 1618    (0800 DL)-Not Given [C]   1223 DL-Given   (1700 DG)-Not Given      (0800 KZ)-Not Given   1146 AG-Given   1551 AG-Given      (0800 AG)-Not Given   1028 AG-Given   1618-D/C'd      sodium chloride 0.9 % IV bolus 1,000 mL  Dose: 1,000 mL  Freq: Once Route: IV  Last Dose: 1,000 mL (05/05/25 0637)  Start: 05/05/25 0629 End: 05/05/25 0737         sucralfate (Carafate) tab 1 g  Dose: 1 g  Freq: 3 times daily before meals and nightly (2200) Route: OR  Start: 05/08/25 1130 End: 05/09/25 0951   Admin Instructions:   Administer on empty stomach. Separate administration from other oral medications by 2 hours if possible     1147 AG-Given   1557 AG-Given   (2200 JQ)-Not Given      0628 JQ-Given   0951-D/C'd       tamsulosin (Flomax) cap 0.4 mg  Dose: 0.4 mg  Freq: Daily Route: OR  Start: 05/05/25 1215 End: 05/09/25 1618    1221 DL-Given [C]        0841 KZ-Given        1028 AG-Given   1618-D/C'd       umeclidinium bromide (Incruse Ellipta) 62.5 MCG/ACT inhaler 1 puff  Dose: 1 puff  Freq: Daily Route: IN  Start: 05/05/25 1215 End: 05/09/25 1618   Admin Instructions:   Do not shake; administer at the same time every day; do not use more than 1 inhalation in 24 hours; only open inhaler cover when ready for administration (opening and closing the cover without inhaling the medicine will cause a dose to  be lost).  RT To Administer First Dose.    1238 DL-Given        0840 KZ-Given        1027 AG-Given   1618-D/C'd             User Information   Initial Name Initial Name   AD Discharge Provider, Automatic  [DISCHAUTO] AG Agustín Croft RN  [[id number]]   DG Ethan Whitehead RN  [id number]] DL Ethan Bourgeois RN  [id number]]   JQ Mukul Nguyen RN  [[id number]] KZ Joslyn Snow RN  [[id number]]   LQ Velma Childress RN  [[id number]]  Jj Nicholson III, RN  [[id number]]         Continuous Meds Sorted by Name  for Godwin Fonseca as of 05/07/25 through 05/09/25    1 Day 3 Days 7 Days 10 Days < Today >   Legend:       Medications 05/07/25 05/08/25 05/09/25   sodium chloride 0.9% infusion  Rate: 125 mL/hr  Freq: Continuous Route: IV  Start: 05/05/25 1200 End: 05/05/25 7399   Admin Instructions:   ED holding order only  Cancel if other admission orders exist!               User Information   ** No User Information **         PRN Meds Sorted by Name  for Godwin Fonseca as of 05/07/25 through 05/09/25    1 Day 3 Days 7 Days 10 Days < Today >   Legend:       Medications 05/07/25 05/08/25 05/09/25   acetaminophen (Tylenol Extra Strength) tab 500 mg  Dose: 500 mg  Freq: Every 6 hours PRN Route: OR  PRN Reasons: Fever,Headaches,moderate pain,mild pain  PRN Comment: equal to or greater than 100.4  Start: 05/05/25 1200 End: 05/09/25 1618   Admin Instructions:   do not initiate oral therapy until 6-8 hours after the last IV acetaminophen dose if IV acetaminophen was used previously      1618-D/C'd         sodium chloride 0.9 % IV bolus 100 mL  Dose: 100 mL  Freq: Every 30 min PRN Route: IV  PRN Comment: For SBP < 90 during dialysis  Start: 05/08/25 1023 End: 05/09/25 1618   Admin Instructions:   Bolus 100mL NS x 2 doses for SBP < 90 mmHg during dialysis.  If SBP still low after 2 doses, proceed to albumin.      1618-D/C'd        And   albumin human (Albumin) 25% injection 25 g  Dose: 25 g  Freq: As needed with Dialysis Route: IV  PRN Comment:  For SBP < 90 during dialysis  Start: 05/08/25 1023 End: 05/09/25 1618   Admin Instructions:   If SBP still less than 90 mmHg after NS boluses, give albumin x 1 dose.  Notify provider if SBP continues to be less than 90 mmHg after albumin dose.   Order specific questions:         1618-D/C'd         sodium chloride 0.9 % IV bolus 100 mL  Dose: 100 mL  Freq: Every 30 min PRN Route: IV  PRN Comment: For SBP < 90 during dialysis  Start: 05/06/25 1217 End: 05/08/25 1216   Admin Instructions:   Bolus 100mL NS x 2 doses for SBP < 90 mmHg during dialysis.  If SBP still low after 2 doses, proceed to albumin.     1216-D/C'd         And   albumin human (Albumin) 25% injection 25 g  Dose: 25 g  Freq: As needed with Dialysis Route: IV  PRN Comment: For SBP < 90 during dialysis  Start: 05/06/25 1217 End: 05/08/25 1216   Admin Instructions:   If SBP still less than 90 mmHg after NS boluses, give albumin x 1 dose.  Notify provider if SBP continues to be less than 90 mmHg after albumin dose.   Order specific questions:        1216-D/C'd          sodium chloride 0.9 % IV bolus 100 mL  Dose: 100 mL  Freq: Every 30 min PRN Route: IV  PRN Comment: For SBP < 90 during dialysis  Start: 05/05/25 1119 End: 05/07/25 1118   Admin Instructions:   Bolus 100mL NS x 2 doses for SBP < 90 mmHg during dialysis.  If SBP still low after 2 doses, proceed to albumin.    1118-D/C'd          And   albumin human (Albumin) 25% injection 25 g  Dose: 25 g  Freq: As needed with Dialysis Route: IV  PRN Comment: For SBP < 90 during dialysis  Start: 05/05/25 1119 End: 05/07/25 1118   Admin Instructions:   If SBP still less than 90 mmHg after NS boluses, give albumin x 1 dose.  Notify provider if SBP continues to be less than 90 mmHg after albumin dose.   Order specific questions:       1118-D/C'd          albuterol (Ventolin HFA) 108 (90 Base) MCG/ACT inhaler 2 puff  Dose: 2 puff  Freq: Every 6 hours PRN Route: IN  PRN Reason: Wheezing  Start: 05/05/25 1157 End:  05/09/25 1618   Admin Instructions:     Rinse and spit after use.  RT to administer first dose.      1618-D/C'd         glucose (Dex4) 15 GM/59ML oral liquid 15 g  Dose: 15 g  Freq: Every 15 min PRN Route: OR  PRN Reason: Low blood glucose  PRN Comment: less than 70 mg/dL, OR blood glucose  mg/dL with symptoms of hypoglycemia  Start: 05/05/25 1336 End: 05/09/25 1618   Admin Instructions:   Use PRN reason as a guide and follow hypoglycemia policy      1618-D/C'd        Or   glucose (Glutose) 40% oral gel 15 g  Dose: 15 g  Freq: Every 15 min PRN Route: OR  PRN Reason: Low blood glucose  PRN Comment: less than 70 mg/dL, OR blood glucose  mg/dL with symptoms of hypoglycemia  Start: 05/05/25 1336 End: 05/09/25 1618   Admin Instructions:   Use PRN reason as a guide and follow hypoglycemia policy      1618-D/C'd        Or   glucose-vitamin C (Dex-4) chewable tab 4 tablet  Dose: 4 tablet  Freq: Every 15 min PRN Route: OR  PRN Reason: Low blood glucose  PRN Comment: less than 70 mg/dL, OR blood glucose  mg/dL with symptoms of hypoglycemia  Start: 05/05/25 1336 End: 05/09/25 1618   Admin Instructions:   Use PRN reason as a guide and follow hypoglycemia policy.      1618-D/C'd        Or   dextrose 50% injection 50 mL  Dose: 50 mL  Freq: Every 15 min PRN Route: IV  PRN Reason: Low blood glucose  PRN Comment: less than 70 mg/dL, and patient NPO or altered consciousness  Start: 05/05/25 1336 End: 05/09/25 1618   Admin Instructions:   Administer slowly over 2-3 minutes.    Use PRN reason as a guide and follow hypoglycemia policy      1618-D/C'd        Or   glucose (Dex4) 15 GM/59ML oral liquid 30 g  Dose: 30 g  Freq: Every 15 min PRN Route: OR  PRN Reason: Low blood glucose  PRN Comment: less than 54 mg/dL  Start: 05/05/25 1336 End: 05/09/25 1618   Admin Instructions:   Use PRN reason as a guide and follow hypoglycemia policy      1618-D/C'd        Or   glucose (Glutose) 40% oral gel 30 g  Dose: 30 g  Freq: Every  15 min PRN Route: OR  PRN Reason: Low blood glucose  PRN Comment: less than 54 mg/dL  Start: 05/05/25 1336 End: 05/09/25 1618   Admin Instructions:   Use PRN reason as a guide and follow hypoglycemia policy      1618-D/C'd        Or   glucose-vitamin C (Dex-4) chewable tab 8 tablet  Dose: 8 tablet  Freq: Every 15 min PRN Route: OR  PRN Reason: Low blood glucose  PRN Comment: less than 54 mg/dL  Start: 05/05/25 1336 End: 05/09/25 1618   Admin Instructions:   Use PRN reason as a guide and follow hypoglycemia policy.      1618-D/C'd        dicyclomine (Bentyl) cap 10 mg  Dose: 10 mg  Freq: 3 times daily PRN Route: OR  PRN Reason: Cramping  Start: 05/09/25 0951 End: 05/09/25 1618      1028 AG-Given   1618-D/C'd       heparin (Porcine) 1000 UNIT/ML injection 4,000 Units  Dose: 4,000 Units  Freq: As needed with Dialysis Route: IF  PRN Reason: Line care  Start: 05/07/25 1016 End: 05/09/25 1618   Admin Instructions:   2000 units for each lumen.    1130 DL-Given by Other         1130 AG-Given by Other   1618-D/C'd       HYDROcodone-acetaminophen (Norco) 5-325 MG per tab 1 tablet  Dose: 1 tablet  Freq: Every 6 hours PRN Route: OR  PRN Reason: moderate pain  Start: 05/05/25 1158 End: 05/09/25 1618    1011 DL-Given   (2146 LQ)-Not Given [C]       1502 AG-Given        1618-D/C'd         HYDROmorphone (Dilaudid) 1 MG/ML injection 0.2 mg  Dose: 0.2 mg  Freq: Every 2 hour PRN Route: IV  PRN Reason: mild pain  Start: 05/05/25 1200 End: 05/09/25 1618   Admin Instructions:   Use PRN reason as a guide and follow range order policy. If oral pain meds are ordered and patient can tolerate oral intake, start with PRN oral pain medications first.                                                       1618-D/C'd        Or   HYDROmorphone (Dilaudid) 1 MG/ML injection 0.4 mg  Dose: 0.4 mg  Freq: Every 2 hour PRN Route: IV  PRN Reason: moderate pain  Start: 05/05/25 1200 End: 05/09/25 2858   Admin Instructions:   Use PRN reason as a guide and  follow range order policy. If oral pain meds are ordered and patient can tolerate oral intake, start with PRN oral pain medications first.    0134 LQ-Given   0645 LQ-Given   1421 DL-Given                     1140 AG-Given     2049 JQ-Given           0633 JQ-Given   0923 KD-Given     1618-D/C'd        Or   HYDROmorphone (Dilaudid) 1 MG/ML injection 0.8 mg  Dose: 0.8 mg  Freq: Every 2 hour PRN Route: IV  PRN Reason: severe pain  Start: 05/05/25 1200 End: 05/09/25 1618   Admin Instructions:   Use PRN reason as a guide and follow range order policy. If oral pain meds are ordered and patient can tolerate oral intake, start with PRN oral pain medications first.               2013 DG-Given   2206 DG-Given       0310 DG-Given   0840 KZ-Given                0314 JQ-Given           1618-D/C'd          Vitals       Date/Time Temp Pulse Resp BP SpO2 Weight O2 Device O2 Flow Rate (L/min) [facility]    05/09/25 1242 -- 96 -- 122/84 -- -- -- --     05/09/25 1213 98.4 °F (36.9 °C) 97 18 143/86 97 % -- None (Room air) --     05/09/25 0838 98.5 °F (36.9 °C) 89 18 142/83 98 % -- -- -- [place]    05/09/25 0716 -- -- -- 145/95 -- -- -- --     05/09/25 0639 97.8 °F (36.6 °C) 89 18 158/107 97 % -- -- --     05/09/25 0040 -- -- -- -- -- 237 lb 7 oz (107.7 kg) -- --     05/08/25 2130 98.2 °F (36.8 °C) 83 18 133/87 95 % -- None (Room air) -- [place]    05/08/25 2053 97.8 °F (36.6 °C) 88 18 122/83 94 % -- None (Room air) -- JQ    05/08/25 1748 -- 76 -- 109/57 -- -- -- -- AG    05/08/25 1539 98 °F (36.7 °C) 84 18 122/74 98 % -- None (Room air) 0 L/min     05/08/25 1131 97.8 °F (36.6 °C) 70 16 110/66 96 % -- None (Room air) 0 L/min     05/08/25 1111 -- 79 16 105/65 94 % -- -- -- MP    05/08/25 1110 -- 76 10 105/65 97 % -- -- -- MP    05/08/25 1105 -- 73 11 97/77 92 % -- None (Room air) -- MP    05/08/25 1100 -- 77 11 79/63 96 % -- -- -- MP    05/08/25 1055 -- 67 14 94/54 99 % -- -- -- MP    05/08/25 1050 -- 68 13 124/72 100 % -- -- -- MP     05/08/25 1048 -- 67 13 124/72 100 % -- Nasal cannula -- MP    05/08/25 1045 97.3 °F (36.3 °C) 77 16 111/74 92 % -- -- -- IVANNA    05/08/25 0824 98.3 °F (36.8 °C) 79 18 136/82 98 % -- None (Room air) 0 L/min JF    05/08/25 0607 97.6 °F (36.4 °C) 76 18 94/54 93 % -- None (Room air) -- AE    05/08/25 0013 98.8 °F (37.1 °C) 89 18 114/75 93 % -- None (Room air) -- AE     05/07/25 1838 -- 90 -- 113/76 -- -- -- -- DL   05/07/25 1607 98.5 °F (36.9 °C) 82 16 132/97 Abnormal  96 % -- None (Room air) -- KR   05/07/25 1300 -- -- -- -- 93 % -- None (Room air) -- KR   05/07/25 1100 97.2 °F (36.2 °C) 82 -- 140/109 Abnormal  -- -- -- -- KR   05/07/25 0839 98 °F (36.7 °C) 77 18 165/109 Abnormal  99 % -- None (Room air) -- AK   05/07/25 0440 97.8 °F (36.6 °C) 78 18 135/80 95 % -- None (Room air) -- ANISA       FOR CONTINUED STAY REVIEW/APPROVAL OF INPT ADMISSION

## 2025-05-15 ENCOUNTER — HOSPITAL ENCOUNTER (OUTPATIENT)
Facility: HOSPITAL | Age: 45
Setting detail: OBSERVATION
Discharge: HOME | End: 2025-05-16
Attending: EMERGENCY MEDICINE | Admitting: INTERNAL MEDICINE
Payer: MEDICARE

## 2025-05-15 DIAGNOSIS — G89.29 CHRONIC ABDOMINAL PAIN: ICD-10-CM

## 2025-05-15 DIAGNOSIS — N18.6 END STAGE RENAL DISEASE (HCC): ICD-10-CM

## 2025-05-15 DIAGNOSIS — K62.5 RECTAL BLEEDING: Primary | ICD-10-CM

## 2025-05-15 DIAGNOSIS — D64.9 ANEMIA, UNSPECIFIED TYPE: ICD-10-CM

## 2025-05-15 DIAGNOSIS — I15.0 RENOVASCULAR HYPERTENSION: ICD-10-CM

## 2025-05-15 DIAGNOSIS — R10.9 CHRONIC ABDOMINAL PAIN: ICD-10-CM

## 2025-05-15 LAB
ALBUMIN SERPL-MCNC: 4.2 G/DL (ref 3.2–4.8)
ALBUMIN/GLOB SERPL: 1.4 {RATIO} (ref 1–2)
ALP LIVER SERPL-CCNC: 113 U/L (ref 45–117)
ALT SERPL-CCNC: 38 U/L (ref 10–49)
ANION GAP SERPL CALC-SCNC: 18 MMOL/L (ref 0–18)
ANTIBODY SCREEN: NEGATIVE
AST SERPL-CCNC: 42 U/L (ref ?–34)
BASOPHILS # BLD AUTO: 0.08 X10(3) UL (ref 0–0.2)
BASOPHILS NFR BLD AUTO: 1.1 %
BILIRUB SERPL-MCNC: 0.4 MG/DL (ref 0.3–1.2)
BUN BLD-MCNC: 109 MG/DL (ref 9–23)
CALCIUM BLD-MCNC: 8.7 MG/DL (ref 8.7–10.6)
CHLORIDE SERPL-SCNC: 106 MMOL/L (ref 98–112)
CO2 SERPL-SCNC: 16 MMOL/L (ref 21–32)
CREAT BLD-MCNC: 15.5 MG/DL (ref 0.7–1.3)
EGFRCR SERPLBLD CKD-EPI 2021: 3 ML/MIN/1.73M2 (ref 60–?)
EOSINOPHIL # BLD AUTO: 0.19 X10(3) UL (ref 0–0.7)
EOSINOPHIL NFR BLD AUTO: 2.6 %
ERYTHROCYTE [DISTWIDTH] IN BLOOD BY AUTOMATED COUNT: 14 %
GLOBULIN PLAS-MCNC: 3.1 G/DL (ref 2–3.5)
GLUCOSE BLD-MCNC: 143 MG/DL (ref 70–99)
HCT VFR BLD AUTO: 23.4 % (ref 39–53)
HGB BLD-MCNC: 7.9 G/DL (ref 13–17.5)
HGB BLD-MCNC: 8 G/DL (ref 13–17.5)
HGB BLD-MCNC: 8.3 G/DL (ref 13–17.5)
IMM GRANULOCYTES # BLD AUTO: 0.02 X10(3) UL (ref 0–1)
IMM GRANULOCYTES NFR BLD: 0.3 %
LYMPHOCYTES # BLD AUTO: 1 X10(3) UL (ref 1–4)
LYMPHOCYTES NFR BLD AUTO: 13.5 %
MCH RBC QN AUTO: 32.4 PG (ref 26–34)
MCHC RBC AUTO-ENTMCNC: 33.8 G/DL (ref 31–37)
MCV RBC AUTO: 95.9 FL (ref 80–100)
MONOCYTES # BLD AUTO: 0.87 X10(3) UL (ref 0.1–1)
MONOCYTES NFR BLD AUTO: 11.7 %
NEUTROPHILS # BLD AUTO: 5.27 X10 (3) UL (ref 1.5–7.7)
NEUTROPHILS # BLD AUTO: 5.27 X10(3) UL (ref 1.5–7.7)
NEUTROPHILS NFR BLD AUTO: 70.8 %
OSMOLALITY SERPL CALC.SUM OF ELEC: 327 MOSM/KG (ref 275–295)
PLATELET # BLD AUTO: 172 10(3)UL (ref 150–450)
POTASSIUM SERPL-SCNC: 4.1 MMOL/L (ref 3.5–5.1)
PROT SERPL-MCNC: 7.3 G/DL (ref 5.7–8.2)
RBC # BLD AUTO: 2.44 X10(6)UL (ref 4.3–5.7)
RH BLOOD TYPE: POSITIVE
SODIUM SERPL-SCNC: 140 MMOL/L (ref 136–145)
WBC # BLD AUTO: 7.4 X10(3) UL (ref 4–11)

## 2025-05-15 PROCEDURE — 99285 EMERGENCY DEPT VISIT HI MDM: CPT

## 2025-05-15 PROCEDURE — 86900 BLOOD TYPING SEROLOGIC ABO: CPT | Performed by: EMERGENCY MEDICINE

## 2025-05-15 PROCEDURE — 80053 COMPREHEN METABOLIC PANEL: CPT | Performed by: EMERGENCY MEDICINE

## 2025-05-15 PROCEDURE — 97530 THERAPEUTIC ACTIVITIES: CPT

## 2025-05-15 PROCEDURE — 96374 THER/PROPH/DIAG INJ IV PUSH: CPT

## 2025-05-15 PROCEDURE — 86901 BLOOD TYPING SEROLOGIC RH(D): CPT | Performed by: EMERGENCY MEDICINE

## 2025-05-15 PROCEDURE — 96376 TX/PRO/DX INJ SAME DRUG ADON: CPT

## 2025-05-15 PROCEDURE — 96375 TX/PRO/DX INJ NEW DRUG ADDON: CPT

## 2025-05-15 PROCEDURE — 97535 SELF CARE MNGMENT TRAINING: CPT

## 2025-05-15 PROCEDURE — 97161 PT EVAL LOW COMPLEX 20 MIN: CPT

## 2025-05-15 PROCEDURE — 86850 RBC ANTIBODY SCREEN: CPT | Performed by: EMERGENCY MEDICINE

## 2025-05-15 PROCEDURE — 85018 HEMOGLOBIN: CPT | Performed by: INTERNAL MEDICINE

## 2025-05-15 PROCEDURE — 94640 AIRWAY INHALATION TREATMENT: CPT

## 2025-05-15 PROCEDURE — 94760 N-INVAS EAR/PLS OXIMETRY 1: CPT

## 2025-05-15 PROCEDURE — 85025 COMPLETE CBC W/AUTO DIFF WBC: CPT | Performed by: EMERGENCY MEDICINE

## 2025-05-15 PROCEDURE — 97165 OT EVAL LOW COMPLEX 30 MIN: CPT

## 2025-05-15 PROCEDURE — 90935 HEMODIALYSIS ONE EVALUATION: CPT | Performed by: INTERNAL MEDICINE

## 2025-05-15 RX ORDER — CLONIDINE HYDROCHLORIDE 0.1 MG/1
0.1 TABLET ORAL 2 TIMES DAILY
Status: DISCONTINUED | OUTPATIENT
Start: 2025-05-15 | End: 2025-05-16

## 2025-05-15 RX ORDER — MORPHINE SULFATE 4 MG/ML
2 INJECTION, SOLUTION INTRAMUSCULAR; INTRAVENOUS EVERY 4 HOURS PRN
Status: DISCONTINUED | OUTPATIENT
Start: 2025-05-15 | End: 2025-05-16

## 2025-05-15 RX ORDER — GABAPENTIN 100 MG/1
200 CAPSULE ORAL 3 TIMES DAILY
Status: DISCONTINUED | OUTPATIENT
Start: 2025-05-15 | End: 2025-05-16

## 2025-05-15 RX ORDER — ONDANSETRON 2 MG/ML
4 INJECTION INTRAMUSCULAR; INTRAVENOUS EVERY 6 HOURS PRN
Status: DISCONTINUED | OUTPATIENT
Start: 2025-05-15 | End: 2025-05-16

## 2025-05-15 RX ORDER — CARVEDILOL 12.5 MG/1
25 TABLET ORAL 2 TIMES DAILY WITH MEALS
Status: DISCONTINUED | OUTPATIENT
Start: 2025-05-15 | End: 2025-05-16

## 2025-05-15 RX ORDER — LISDEXAMFETAMINE DIMESYLATE 60 MG/1
60 CAPSULE ORAL EVERY MORNING
Status: DISCONTINUED | OUTPATIENT
Start: 2025-05-15 | End: 2025-05-15

## 2025-05-15 RX ORDER — DEXTROAMPHETAMINE SACCHARATE, AMPHETAMINE ASPARTATE, DEXTROAMPHETAMINE SULFATE AND AMPHETAMINE SULFATE 2.5; 2.5; 2.5; 2.5 MG/1; MG/1; MG/1; MG/1
10 TABLET ORAL
Refills: 0 | Status: DISCONTINUED | OUTPATIENT
Start: 2025-05-15 | End: 2025-05-16

## 2025-05-15 RX ORDER — ALBUTEROL SULFATE 90 UG/1
2 INHALANT RESPIRATORY (INHALATION) EVERY 6 HOURS PRN
Status: DISCONTINUED | OUTPATIENT
Start: 2025-05-15 | End: 2025-05-16

## 2025-05-15 RX ORDER — CALCIUM ACETATE 667 MG/1
667 CAPSULE ORAL
Status: DISCONTINUED | OUTPATIENT
Start: 2025-05-15 | End: 2025-05-16

## 2025-05-15 RX ORDER — PROCHLORPERAZINE EDISYLATE 5 MG/ML
5 INJECTION INTRAMUSCULAR; INTRAVENOUS EVERY 8 HOURS PRN
Status: DISCONTINUED | OUTPATIENT
Start: 2025-05-15 | End: 2025-05-16

## 2025-05-15 RX ORDER — ARIPIPRAZOLE 10 MG/1
15 TABLET ORAL DAILY
Status: DISCONTINUED | OUTPATIENT
Start: 2025-05-15 | End: 2025-05-16

## 2025-05-15 RX ORDER — HYDRALAZINE HYDROCHLORIDE 20 MG/ML
10 INJECTION INTRAMUSCULAR; INTRAVENOUS ONCE
Status: COMPLETED | OUTPATIENT
Start: 2025-05-15 | End: 2025-05-15

## 2025-05-15 RX ORDER — POLYETHYLENE GLYCOL 3350 17 G/17G
17 POWDER, FOR SOLUTION ORAL DAILY PRN
Status: DISCONTINUED | OUTPATIENT
Start: 2025-05-15 | End: 2025-05-16

## 2025-05-15 RX ORDER — ACETAMINOPHEN 500 MG
500 TABLET ORAL EVERY 4 HOURS PRN
Status: DISCONTINUED | OUTPATIENT
Start: 2025-05-15 | End: 2025-05-16

## 2025-05-15 RX ORDER — PANTOPRAZOLE SODIUM 40 MG/1
40 TABLET, DELAYED RELEASE ORAL
Status: DISCONTINUED | OUTPATIENT
Start: 2025-05-15 | End: 2025-05-16

## 2025-05-15 RX ORDER — SEVELAMER CARBONATE 800 MG/1
2400 TABLET, FILM COATED ORAL
Status: DISCONTINUED | OUTPATIENT
Start: 2025-05-15 | End: 2025-05-16

## 2025-05-15 RX ORDER — SENNOSIDES 8.6 MG
17.2 TABLET ORAL NIGHTLY PRN
Status: DISCONTINUED | OUTPATIENT
Start: 2025-05-15 | End: 2025-05-16

## 2025-05-15 RX ORDER — BUPROPION HYDROCHLORIDE 150 MG/1
150 TABLET ORAL DAILY
Status: DISCONTINUED | OUTPATIENT
Start: 2025-05-15 | End: 2025-05-16

## 2025-05-15 RX ORDER — BISACODYL 10 MG
10 SUPPOSITORY, RECTAL RECTAL
Status: DISCONTINUED | OUTPATIENT
Start: 2025-05-15 | End: 2025-05-16

## 2025-05-15 RX ORDER — LAMOTRIGINE 100 MG/1
100 TABLET ORAL DAILY
Status: DISCONTINUED | OUTPATIENT
Start: 2025-05-15 | End: 2025-05-16

## 2025-05-15 RX ORDER — HYDRALAZINE HYDROCHLORIDE 25 MG/1
25 TABLET, FILM COATED ORAL 3 TIMES DAILY
Status: DISCONTINUED | OUTPATIENT
Start: 2025-05-15 | End: 2025-05-16

## 2025-05-15 RX ORDER — TAMSULOSIN HYDROCHLORIDE 0.4 MG/1
0.4 CAPSULE ORAL DAILY
Status: DISCONTINUED | OUTPATIENT
Start: 2025-05-15 | End: 2025-05-16

## 2025-05-15 RX ORDER — HYDROCODONE BITARTRATE AND ACETAMINOPHEN 5; 325 MG/1; MG/1
1 TABLET ORAL EVERY 6 HOURS PRN
Status: DISCONTINUED | OUTPATIENT
Start: 2025-05-15 | End: 2025-05-16

## 2025-05-15 RX ORDER — ATORVASTATIN CALCIUM 20 MG/1
20 TABLET, FILM COATED ORAL EVERY EVENING
Status: DISCONTINUED | OUTPATIENT
Start: 2025-05-15 | End: 2025-05-16

## 2025-05-15 RX ORDER — MORPHINE SULFATE 4 MG/ML
4 INJECTION, SOLUTION INTRAMUSCULAR; INTRAVENOUS ONCE
Status: COMPLETED | OUTPATIENT
Start: 2025-05-15 | End: 2025-05-15

## 2025-05-15 RX ORDER — ALBUMIN (HUMAN) 12.5 G/50ML
25 SOLUTION INTRAVENOUS
Status: DISCONTINUED | OUTPATIENT
Start: 2025-05-15 | End: 2025-05-16

## 2025-05-15 RX ORDER — NIFEDIPINE 30 MG/1
30 TABLET, EXTENDED RELEASE ORAL DAILY
Status: DISCONTINUED | OUTPATIENT
Start: 2025-05-15 | End: 2025-05-16

## 2025-05-15 RX ORDER — HEPARIN SODIUM 1000 [USP'U]/ML
1.5 INJECTION, SOLUTION INTRAVENOUS; SUBCUTANEOUS
Status: COMPLETED | OUTPATIENT
Start: 2025-05-15 | End: 2025-05-16

## 2025-05-15 NOTE — ED PROVIDER NOTES
Patient Seen in: [facility] In [place]      History     Chief Complaint   Patient presents with    Bleeding     Stated Complaint: rectal bleeding    Subjective:   HPI  History of Present Illness            45-year-old male reports experiencing severe abdominal pain and bloody bowel movements, with three episodes occurring today. He describes the bleeding as originating from deep within, not associated with hemorrhoids. He has a history of similar episodes and has undergone endoscopy in the past, which indicated the issue was confined, leading to a consultation with a gastroenterologist. He denies missing any dialysis sessions and is not currently on any blood thinners.  Patient states he has had 4 very bloody bowel movements today no stool just all blood clots      Objective:     No pertinent past medical history.            No pertinent past surgical history.              No pertinent social history.                              Physical Exam     ED Triage Vitals   BP 05/15/25 0333 (!) 185/123   Pulse 05/15/25 0333 106   Resp 05/15/25 0333 (!) 34   Temp 05/15/25 0333 98.7 °F (37.1 °C)   Temp src 05/15/25 0333 Oral   SpO2 05/15/25 0333 98 %   O2 Device 05/15/25 0349 None (Room air)       Current Vitals:   Vital Signs  BP: (!) 149/95  Pulse: 105  Resp: (!) 27  Temp: 98.7 °F (37.1 °C)  Temp src: Oral    Oxygen Therapy  SpO2: 99 %  O2 Device: None (Room air)          Physical Exam    Vital signs reviewed  General appearance: Patient is alert and in moderate abdominal pain HEENT: Pupils equal react to light extraocular muscles intact no scleral icterus, mucous membranes are moist, there is no erythema or exudate in the posterior pharynx  Neck: Supple no JVD no lymphadenopathy no meningismus no carotid bruit  CV: Regular rate and rhythm no murmur rub  Respiratory: Clear to auscultation bilaterally no crackles no wheezes no accessory muscle use  Abdomen: Diffuse abdominal tenderness noted, no rebound  no guarding  no hepatosplenomegaly bowel sounds are present , no pulsatile mass, rectal exam bright red blood in vault no external hemorrhoids noted  Extremities: No clubbing cyanosis or edema 2+ distal pulses.  Neuro: Cranial nerves II through XII intact with no gross focal sensory or motor abnormality.            ED Course     Labs Reviewed   COMP METABOLIC PANEL (14) - Abnormal; Notable for the following components:       Result Value    Glucose 143 (*)     CO2 16.0 (*)      (*)     Creatinine 15.50 (*)     Calculated Osmolality 327 (*)     eGFR-Cr 3 (*)     AST 42 (*)     All other components within normal limits   CBC WITH DIFFERENTIAL WITH PLATELET - Abnormal; Notable for the following components:    RBC 2.44 (*)     HGB 7.9 (*)     HCT 23.4 (*)     All other components within normal limits   ABORH (BLOOD TYPE)   TYPE AND SCREEN    Narrative:     The following orders were created for panel order Type and screen.  Procedure                               Abnormality         Status                     ---------                               -----------         ------                     ABORH (Blood Type)[[id number]]                               Final result               Antibody Screen[[id number]]                                  In process                   Please view results for these tests on the individual orders.   ANTIBODY SCREEN   RAINBOW DRAW BLUE          Results            Patient was evaluated the emergency department had a CBC chemistry drawn.  Patient has had numerous CT scans and MRIs of his abdomen.  Wait for his blood work but due to his complex history we will admit patient for GI to see.        I discussed case with both the hospitalist and GI.  GI did not feel there would be much for them to do and really think surgery needs to see them eventually and take his hemorrhoids out.  Also hospitalist did not feel any imaging was necessary.  Patient was given some morphine for pain and will  be transferred to [facility] for further workup and management            MDM      Differential diagnosis reflecting the complexity of care include: Rectal bleeding, hemorrhoids, anemia, end-stage renal disease    Comorbidities that add complexity to management include: End-stage renal disease on chronic dialysis diabetes, heart attack, stroke, hypertension    External chart review was done and was noted: Patient's discharge summary from 10 days ago was reviewed and patient was admitted at that point for intractable abdominal pain nausea and vomiting and had a complete workup nothing was found        Discussions of management was done with: Hospitalist and GI were consulted and agree with plan      Diagnostic tests and medications considered but not ordered were: Repeat CT scan was considered but hospitalist did not feel another imaging was necessary    Social determinants of health that affect care: Patient continuously not to follow-up as he was been told numerous times to get his hemorrhoids removed    Shared decision making was done by myself and hospitalist and GI.  Patient will be admitted due to hemoglobin dropping and bloody bowel movements        Admission disposition: 5/15/2025  4:11 AM           Medical Decision Making      Disposition and Plan     Clinical Impression:  1. Rectal bleeding    2. End stage renal disease (HCC)    3. Chronic abdominal pain    4. Anemia, unspecified type    5. Renovascular hypertension         Disposition:  Admit  5/15/2025  4:11 am    Follow-up:  No follow-up provider specified.        Medications Prescribed:  Current Discharge Medication List                Supplementary Documentation: Patient was evaluated in the emergency department and at this point patient will need admission for further management of medical condition.  Patient was stable in the emergency department and will be transferred to floor for further definitive care.  Patient's questions were answered.    This  note was prepared using Dragon Medical voice recognition dictation software. As a result errors may occur. When identified these errors have been corrected. While every attempt is made to correct errors during dictation discrepancies may still exist        Hospital Problems       Present on Admission  Date Reviewed: 3/30/2025          ICD-10-CM Noted POA    * (Principal) Rectal bleeding K62.5 3/12/2021 Unknown

## 2025-05-15 NOTE — PLAN OF CARE
Pt is a&ox4. L limb precaution d/t av fistula. L permacath. RA. NSR, ST on tele. Voids per BRP, up SBA. Tolerating regular diet. P c/o pain PRN meds given see MAR. Kiara called for HD today 5/15. Pt updated on poc. No further needs at this time.    Problem: PAIN - ADULT  Goal: Verbalizes/displays adequate comfort level or patient's stated pain goal  Description: INTERVENTIONS:  - Encourage pt to monitor pain and request assistance  - Assess pain using appropriate pain scale  - Administer analgesics based on type and severity of pain and evaluate response  - Implement non-pharmacological measures as appropriate and evaluate response  - Consider cultural and social influences on pain and pain management  - Manage/alleviate anxiety  - Utilize distraction and/or relaxation techniques  - Monitor for opioid side effects  - Notify MD/LIP if interventions unsuccessful or patient reports new pain  - Anticipate increased pain with activity and pre-medicate as appropriate  Outcome: Progressing

## 2025-05-15 NOTE — CONSULTS
[facility]   part of [facility]    Report of Consultation    Godwin Fonseca Patient Status:  Observation    1980 MRN [id number]   [facility] 5NW-A Attending Yuriy Forrest MD   Hosp Day # 0 PCP Adrian Small MD     Date of consult: 5/15/2025    REASON FOR CONSULT:     ESRD    HISTORY OF PRESENT ILLNESS:     Godwin Fonseca is a a(n) 45 year old male w ho ESRD on iHD MWF via permcath (clotted/failed LUE AVF), HTN, severe MR s/p MVR x 2, HFpEF, DVT/PE, TIA, MGUS, bipolar disorder, recurrent GI bleed, diffuse body pains who presents with bloody bowel movements. Missed HD wed. Last HD Monday. No other complaints     REVIEW OF SYSTEMS:     Please see HPI for pertinent positives. 10 point review of systems otherwise reviewed and negative.     HISTORY:     Past Medical History[1]  Past Surgical History[2]  Family History[3]   reports that he quit smoking about 3 years ago. His smoking use included cigarettes. He started smoking about 30 years ago. He has a 27 pack-year smoking history. He has never been exposed to tobacco smoke. He has never used smokeless tobacco. He reports that he does not drink alcohol and does not use drugs.    ALLERGIES:     Allergies[4]    MEDICATIONS:     Current Hospital Medications[5]  Prior to Admission Medications[6]      PHYSICAL EXAM:     Vital Signs: /90   Pulse 105   Temp 98 °F (36.7 °C) (Oral)   Resp (!) 27   Ht 6' 2\" (1.88 m)   Wt 234 lb (106.1 kg)   SpO2 99%   BMI 30.04 kg/m²   Temp (24hrs), Av.5 °F (36.9 °C), Min:98 °F (36.7 °C), Max:98.7 °F (37.1 °C)     No intake or output data in the 24 hours ending 05/15/25 1543  Wt Readings from Last 3 Encounters:   05/15/25 234 lb (106.1 kg)   25 237 lb 7 oz (107.7 kg)   25 237 lb 7 oz (107.7 kg)       General: NAD  HEENT: NCAT, MMM, EOMI  Neck: Supple   Cardiac: Regular rate and rhythm   Lungs: CTAB   Abdomen: Soft, non-tender, non-distended   : No CVA tenderness  Extremities: no  leg edema  Neurologic/Psych: mentating well, no asterixis  Skin: No rashes    LABORATORY DATA:       Lab Results   Component Value Date     (H) 05/15/2025     (H) 05/15/2025    BUNCREA 13.0 03/07/2022    CREATSERUM 15.50 (H) 05/15/2025    ANIONGAP 18 05/15/2025    GFR 59 (L) 01/04/2018    GFRNAA 30 (L) 07/12/2022    GFRAA 35 (L) 07/12/2022    CA 8.7 05/15/2025    OSMOCALC 327 (H) 05/15/2025    ALKPHO 113 05/15/2025    AST 42 (H) 05/15/2025    ALT 38 05/15/2025    BILT 0.4 05/15/2025    TP 7.3 05/15/2025    ALB 4.2 05/15/2025    GLOBULIN 3.1 05/15/2025     05/15/2025    K 4.1 05/15/2025     05/15/2025    CO2 16.0 (L) 05/15/2025     Lab Results   Component Value Date    WBC 7.4 05/15/2025    RBC 2.44 (L) 05/15/2025    HGB 8.0 (L) 05/15/2025    HCT 23.4 (L) 05/15/2025    .0 05/15/2025    MPV 11.5 12/14/2012    MCV 95.9 05/15/2025    MCH 32.4 05/15/2025    MCHC 33.8 05/15/2025    RDW 14.0 05/15/2025    NEPRELIM 5.27 05/15/2025    NEPERCENT 70.8 05/15/2025    LYPERCENT 13.5 05/15/2025    MOPERCENT 11.7 05/15/2025    EOPERCENT 2.6 05/15/2025    BAPERCENT 1.1 05/15/2025    NE 5.27 05/15/2025    LYMABS 1.00 05/15/2025    MOABSO 0.87 05/15/2025    EOABSO 0.19 05/15/2025    BAABSO 0.08 05/15/2025     Lab Results   Component Value Date    MALBP 167.00 05/24/2023    CREUR 142.00 05/24/2023    CREUR 142.00 05/24/2023     Lab Results   Component Value Date    COLORUR Light-Yellow 05/08/2025    CLARITY Clear 05/08/2025    SPECGRAVITY 1.011 05/08/2025    GLUUR Normal 05/08/2025    BILUR Negative 05/08/2025    KETUR Negative 05/08/2025    BLOODURINE Negative 05/08/2025    PHURINE 7.0 05/08/2025    PROUR 100 (A) 05/08/2025    UROBILINOGEN Normal 05/08/2025    NITRITE Negative 05/08/2025    LEUUR Negative 05/08/2025    WBCUR 1-5 05/08/2025    RBCUR 3-5 (A) 05/08/2025    EPIUR None Seen 05/08/2025    BACUR None Seen 05/08/2025    CAOXUR Occasional (A) 04/20/2018    HYLUR Present (A) 05/14/2019          IMAGING:     All imaging studies personally reviewed.    No results found.      ASSESSMENT/PLAN:   45 year old male w ho ESRD on iHD MWF via permcath (clotted/failed LUE AVF), HTN, severe MR s/p MVR x 2, HFpEF, DVT/PE, TIA, MGUS, bipolar disorder, recurrent GI bleed, diffuse body pains who presents with bloody bowel movements.      ESRD:  -- HD MWF per outpatient schedule  -- missed HD Wednesday. Will do HD today w 0L UF. HD directly managed. Will need HD again tomorrow to stay on MWF schedule   -- avoid morphine  -- gabapentin should be renally dosed    Acidosis  -- HD     Anemia:  -- epo when BPs allow  -- transfusion prn      MBD / Hyperphosphatemia  -- trend phos  -- sevelamer 2400 TID AC  -- calcium acetate 667 TIDAC  -- low phos diet     HTN:  -- clonidine should be continued BID to avoid rebound  -- takes remaining antihypertensives after HD but ok to hold hydralazine if there is a concern for dropping too much BP after HD; hold antihypertensives (except clonidine per above) pre HD for now since he has dropped BP with UF in the past here     LUE AVF with arm swelling:  -- L arm precautions  -- US duplex dialysis access with thrombus - on last admit our team discussed with vascular surgery - he has a failing fistula; no further eval/intervention planned for current fistula; patient will need outpatient follow up with vascular surgery for new AVF eval    D/w Dr Forrest    Thank you for allowing me to participate in the care of your patient. Please do not hesitate to contact me with concerns or questions.    Samaria Nava MD  [facility]    5/15/2025  3:43 PM         [1]   Past Medical History:   Anxiety state    Arrhythmia    Asthma (HCC)    Attention deficit hyperactivity disorder (ADHD)    Back problem    Bipolar 1 disorder (HCC)    CKD (chronic kidney disease) stage 3, GFR 30-59 ml/min ([facility])    Dr Meeks    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HCC)    Congestive heart disease (HCC)     COPD (chronic obstructive pulmonary disease) (HCC)    Coronary atherosclerosis    Deep vein thrombosis (HCC)    at age 17 R/T cast    Depression    Diabetes (HCC)    Dialysis patient    Diverticulosis of large intestine    Essential hypertension    3/21 echo: severe concentric LVH with normal EF and no MR or pHTN    Extrinsic asthma, unspecified    Heart attack (HCC)    2016- angiogram- no intervention    Heart valve disease    mitral valve repair in 1994/    High blood pressure    High cholesterol    History of blood transfusion    History of mitral valve repair    Hyperlipidemia    Low back pain    tight and stiff after sweeping and mopping    LVH (left ventricular hypertrophy)    Migraines    Mixed hyperlipidemia     HDL 38 LDL 97 VLDL 57     Monoclonal gammopathy    IgG kappa     Muscle weakness    MVP (mitral valve prolapse)    Repair 1994 at [place]; echoes as recently as 3/21 show mild or trivial MR and no stenosis    Neuropathy    Osteoarthritis    hip ,knees    Pneumonia due to organism    Pulmonary embolism (HCC)    Renal disorder    Stroke (HCC)    TIA (transient ischemic attack)    Initial history of left-sided weakness and slurred speech. (+) cocaine. MRI of the brain, CT angiogram of the head and neck, and 2D echo are all unremarkable.     TMJ (dislocation of temporomandibular joint)    Troponin level elevated    Trop 60 60 47 with TIA and no CP: Lexiscan negative with EF 51    Visual impairment   [2]   Past Surgical History:  Procedure Laterality Date    Av fistula revision, open Left     Cabg      Colonoscopy N/A 03/26/2023    Procedure: COLONOSCOPY;  Surgeon: Heath Vu MD;  Location:  ENDOSCOPY    Colonoscopy N/A 12/30/2023    Procedure: COLONOSCOPY with cold snare polypectomy and forcep polypectomy;  Surgeon: Ousmane Suarez MD;  Location:  ENDOSCOPY    Colonoscopy N/A 3/9/2025    Procedure: COLONOSCOPY WITH COLD SNARE POLYPECTOMY AND ENDO CLIPS X 2;  Surgeon: Bakari Noguera  MD CHINMAY;  Location:  ENDOSCOPY    Colonoscopy & polypectomy  2019    Egd  2019    Duodenitis. Biopsied. EUS for weight loss was negative    Heart surgery      Hernia surgery  08/17/2022    Dr Barnes    Laminectomy,>2 sgmt,lumbar  09/06/2018    L4-L5 Decomp Discectomy ROEM L4-L5    Mitralplasty w cp bypass  1994    [place]: Repair    Repair rotator cuff,chronic Left     torn and had a ruptured bicep    Sinus surgery        Spine surgery procedure unlisted      Valve repair  1994    mitral valve   [3]   Family History  Problem Relation Age of Onset    Hypertension Father     Alcohol and Other Disorders Associated Father     Substance Abuse Father         cocaine    Dementia Father     Cancer Father         lung    Diabetes Mother     Cancer Mother         multiple myeloma    Hypertension Mother     Anxiety Maternal Aunt     Depression Maternal Aunt     Anxiety Maternal Aunt     Depression Maternal Aunt     Bipolar Disorder Maternal Aunt     Diabetes Maternal Grandmother     Hypertension Maternal Grandmother     Cancer Maternal Grandfather         stomach cancer    Diabetes Maternal Grandfather     Hypertension Maternal Grandfather     Alcohol and Other Disorders Associated Maternal Grandfather     Hypertension Paternal Grandmother     Hypertension Paternal Grandfather     Cancer Sister         uterine and ovarian    Hypertension Sister     Cancer Maternal Uncle         lung    Cancer Paternal Aunt         throat   [4]   Allergies  Allergen Reactions    Hydrochlorothiazide RASH and HIVES   [5]   Current Facility-Administered Medications:     albuterol (Ventolin HFA) 108 (90 Base) MCG/ACT inhaler 2 puff, 2 puff, Inhalation, Q6H PRN    ARIPiprazole (Abilify) tab 15 mg, 15 mg, Oral, Daily    atorvastatin (Lipitor) tab 20 mg, 20 mg, Oral, QPM    buPROPion ER (Wellbutrin XL) 24 hr tab 150 mg, 150 mg, Oral, Daily    calcium acetate (Phoslo) cap 667 mg, 667 mg, Oral, TID with meals    carvedilol (Coreg) tab 25 mg, 25 mg, Oral,  BID with meals    cloNIDine (Catapres) tab 0.1 mg, 0.1 mg, Oral, BID    FLUoxetine (PROzac) cap 40 mg, 40 mg, Oral, Daily    gabapentin (Neurontin) cap 200 mg, 200 mg, Oral, TID    hydrALAZINE (Apresoline) tab 25 mg, 25 mg, Oral, TID    HYDROcodone-acetaminophen (Norco) 5-325 MG per tab 1 tablet, 1 tablet, Oral, Q6H PRN    lamoTRIgine (LaMICtal) tab 100 mg, 100 mg, Oral, Daily    NIFEdipine ER (Procardia-XL) 24 hr tab 30 mg, 30 mg, Oral, Daily    pantoprazole (Protonix) DR tab 40 mg, 40 mg, Oral, QAM AC    sevelamer carbonate (Renvela) tab 2,400 mg, 2,400 mg, Oral, TID CC    tamsulosin (Flomax) cap 0.4 mg, 0.4 mg, Oral, Daily    umeclidinium bromide (Incruse Ellipta) 62.5 MCG/ACT inhaler 1 puff, 1 puff, Inhalation, Daily    acetaminophen (Tylenol Extra Strength) tab 500 mg, 500 mg, Oral, Q4H PRN    melatonin tab 3 mg, 3 mg, Oral, Nightly PRN    polyethylene glycol (PEG 3350) (Miralax) 17 g oral packet 17 g, 17 g, Oral, Daily PRN    sennosides (Senokot) tab 17.2 mg, 17.2 mg, Oral, Nightly PRN    bisacodyl (Dulcolax) 10 MG rectal suppository 10 mg, 10 mg, Rectal, Daily PRN    ondansetron (Zofran) 4 MG/2ML injection 4 mg, 4 mg, Intravenous, Q6H PRN    prochlorperazine (Compazine) 10 MG/2ML injection 5 mg, 5 mg, Intravenous, Q8H PRN    morphINE PF 4 MG/ML injection 2 mg, 2 mg, Intravenous, Q4H PRN    amphetamine-dextroamphetamine (Adderall) tab 10 mg, 10 mg, Oral, BID@0800,1200    sodium chloride 0.9 % IV bolus 100 mL, 100 mL, Intravenous, Q30 Min PRN **AND** albumin human (Albumin) 25% injection 25 g, 25 g, Intravenous, PRN Dialysis    heparin (Porcine) 1000 UNIT/ML injection 1,500 Units, 1.5 mL, Intracatheter, Once  [6]   No current outpatient medications on file.

## 2025-05-15 NOTE — ED QUICK NOTES
Orders for admission, patient is aware of plan and ready to go upstairs. Any questions, please call ED RN Jayme  at extension [phone].     Patient Covid vaccination status: Fully vaccinated     COVID Test Ordered in ED: None    COVID Suspicion at Admission: N/A    Running Infusions: Medication Infusions[1] None    Mental Status/LOC at time of transport: A x o 4    Other pertinent information:  hydralazine 10 mg and morphine 4 mg IVP given .   CIWA score: N/A   NIH score:  N/A             [1]

## 2025-05-15 NOTE — CM/SW NOTE
This is a Code 44. Patient failed inpatient criteria. Second level of review completed and supports observation.  UR committee in agreement. Discussed with Dr. Echevarria who approves observation status.  Observation order placed. LUKE given to the patient and signed copy placed in their chart.

## 2025-05-15 NOTE — DISCHARGE INSTRUCTIONS
Sometimes managing your health at home requires assistance.  The [facility] team has recognized your preference to use [facility] [phone].  A representative from the home health agency will contact you or your family to schedule your first visit.

## 2025-05-15 NOTE — CM/SW NOTE
05/15/25 1600   CM/SW Referral Data   Referral Source Social Work (self-referral)   Reason for Referral Discharge planning   Informant Patient     Sw met with pt to discuss dc planning. PT recs [facility].  Pt states he uses [facility] [phone] and would like to use them again.  Referral sent to [facility] in [place]- await acceptance.    Frances Calderon, JOVANI  /Discharge Planner

## 2025-05-15 NOTE — OCCUPATIONAL THERAPY NOTE
OCCUPATIONAL THERAPY EVALUATION - INPATIENT    Room Number: 508/508-A   Evaluation Date: 5/15/2025   Type of Evaluation: Initial  Presenting Problem: Rectal bleeding    Physician Order: IP Consult to Occupational Therapy  Reason for Therapy:  ADL/IADL Dysfunction and Discharge Planning      OCCUPATIONAL THERAPY ASSESSMENT   Patient is a 45 year old female admitted on 5/15/2025  with Presenting Problem: Rectal bleeding. Co-Morbidities : Chronic GIB, pt reports C5-6 compression fx, CKD on HD, DM2, HTN, CAD, COPD, bipolar d/o, ADD, neuropathy, anxiety, depression  Patient is currently functioning near baseline with self-care and functional mobility.  Prior to admission, patient's baseline is independent level.  Patient met all OT goals at mod I level.  Patient reports no further questions/concerns at this time.     Patient will be discharged from OT services at this time.  Will benefit from return to home setting at discharge from hospital.      WEIGHT BEARING RESTRICTION  Weight Bearing Restriction: None                Recommendations for nursing staff:   Transfers: one person   Toileting location: Toilet    EVALUATION SESSION:  Patient at start of session: supine  FUNCTIONAL TRANSFER ASSESSMENT  Sit to Stand: Chair; Edge of Bed  Edge of Bed: Modified Independent  Chair: Modified Independent      BED MOBILITY  Rolling: Independent  Supine to Sit : Modified Independent  Sit to Supine (OT): Modified Independent  Scooting: Mod I      BALANCE ASSESSMENT  Static Sitting: Modified Independent  Static Standing: Modified Independent      FUNCTIONAL ADL ASSESSMENT  Eating: Independent  Grooming Seated: Modified Independent  LB Dressing Seated: Modified Independent (set-up)  Toileting Seated: Modified Independent (set-up)        ACTIVITY TOLERANCE:   Pulse: 105  Heart Rate Source: Monitor                   O2 SATURATIONS  Oxygen Therapy  SPO2% on Room Air at Rest: 92  SPO2% Ambulation on Room Air: 95    COGNITION  Overall  Cognitive Status:  WFL - within functional limits  COGNITION ASSESSMENTS       Upper Extremity:   ROM: within functional limits   Strength: is within functional limits   Coordination:  Gross motor: WFL  Fine motor: WFL  Sensation: Light touch:  intact; no c/o numbness or tingling    Vision: no acute changes reported  Able to read clock on wall without difficulty    EDUCATION PROVIDED  Patient Education : Role of Occupational Therapy; Plan of Care; Discharge Recommendations; DME Recommendations; Functional Transfer Techniques; Fall Prevention; Posture/Positioning; Energy Conservation; Proper Body Mechanics  Patient's Response to Education: Verbalized Understanding      Therapist comments: OT educated patient on safety,  sequencing, energy conservation, pain management, home modifications and adaptive equipment to increase independence with ADLs.    Patient needs increased time to complete all tasks presented.  Patient was able to bring foot up to opposite knee (B'ly) to lay/doff socks  Walked in hallway with use of walker and increased time.    Patient End of Session: Up in chair, With  staff, Needs met, Call light within reach, RN aware of session/findings, All patient questions and concerns addressed, Alarm set, Discussed recommendations with /, Hospital anti-slip socks    OCCUPATIONAL PROFILE    HOME SITUATION  Type of Home: House  Home Layout: Two level  Lives With:  (mother)    Toilet and Equipment: Comfort height toilet, Grab bar  Shower/Tub and Equipment: Walk-in shower, Grab bar  Other Equipment:  (RW, cane)    Occupation/Status: not employed  Hand Dominance: Right  Drives: Yes  Patient Regularly Uses: Glasses (has RW and cane)    Prior Level of Function:   Pt reports that he is typically indep with ADLs and mobility. Reports living with his mom who pt assists laundry and stairs. Pt reports he has a previous TIA with residual R sided weakness and chronic back pain and reports  limited with long distance ambulation and stairs.  No reported falls in the past 6 months.    SUBJECTIVE  PAIN ASSESSMENT  Ratin  Location: low back and down legs  Management Techniques: Activity promotion, Body mechanics, Repositioning, Nurse notified    OBJECTIVE  Precautions: Bed/chair alarm  Fall Risk: High fall risk    WEIGHT BEARING RESTRICTION  Weight Bearing Restriction: None                AM-PAC ‘6-Clicks’ Inpatient Daily Activity Short Form  -   Putting on and taking off regular lower body clothing?: A Little  -   Bathing (including washing, rinsing, drying)?: A Little  -   Toileting, which includes using toilet, bedpan or urinal? : A Little  -   Putting on and taking off regular upper body clothing?: None  -   Taking care of personal grooming such as brushing teeth?: None  -   Eating meals?: None    AM-PAC Score:  Score: 21  Approx Degree of Impairment: 32.79%  Standardized Score (AM-PAC Scale): 44.27      ADDITIONAL TESTS     NEUROLOGICAL FINDINGS        PLAN   Patient has been evaluated and presents with no skilled Occupational Therapy needs at this time.  Patient discharged from Occupational Therapy services.  Please re-order if a new functional limitation presents during this admission.      Patient Evaluation Complexity Level:   Occupational Profile/Medical History LOW - Brief history including review of medical or therapy records    Specific performance deficits impacting engagement in ADL/IADL LOW  1 - 3 performance deficits    Client Assessment/Performance Deficits LOW - No comorbidities nor modifications of tasks    Clinical Decision Making LOW - Analysis of occupational profile, problem-focused assessments, limited treatment options    Overall Complexity LOW     OT Session Time: 30 minutes  Self-Care Home Management: 15 minutes

## 2025-05-15 NOTE — H&P
General Medicine H&P     Chief Complaint   Patient presents with    Bleeding        PCP: Adrian Small MD    History of Present Illness: Patient is a 45 year old male with PMH including but not limited to HTN, CHF, ESRD on HD, DM, bipolar do, pavan, here for abd pain and bloody BMs.     Feels full and releases dark clots but then disperses in toilet and red, occurred over past day. Pain across abd/back/torso. Has been seen by GI last admit and in past. Hgb 7.9. Currently sitting in chair, reports some pain.    Pt has had multiple recent admissions, last admitted 5/5/2025-5/9/2025 for abd pain. Previously admitted 4/27/2025 -4/30/25 for concern for PermCath infection.         Past Medical History[1]   Past Surgical History[2]     ALL:  Allergies[3]     Scheduled Meds[4]    Social History     Tobacco Use    Smoking status: Former     Current packs/day: 0.00     Average packs/day: 1 pack/day for 27.0 years (27.0 ttl pk-yrs)     Types: Cigarettes     Start date: 2/28/1995     Quit date: 2/28/2022     Years since quitting: 3.2     Passive exposure: Never    Smokeless tobacco: Never   Substance Use Topics    Alcohol use: No        Fam Hx  Family History[5]    Review of Systems  Comprehensive ROS reviewed and negative except for what's stated above. \    OBJECTIVE:  BP (!) 176/110 (BP Location: Right arm)   Pulse 105   Temp 98.7 °F (37.1 °C) (Oral)   Resp (!) 27   Ht 6' 2\" (1.88 m)   Wt 234 lb (106.1 kg)   SpO2 99%   BMI 30.04 kg/m²     General:  Alert, no distress, appears stated age.    Head:  Normocephalic, without obvious abnormality, atraumatic.   Eyes:  Sclera anicteric, EOMs intact.    Nose: Nares normal,  Mucosa normal    Throat: Lips normal   Neck: Supple, symmetrical, trachea midline   Lungs:   Clear to auscultation bilaterally. Normal effort   Chest wall:  No tenderness or deformity   Heart:  Regular rate and rhythm, S1, S2 normal, no murmur, rub or gallop appreciated   Abdomen:   Soft, NT/ND, Bowel sounds  normal. No masses,  No organomegaly.    Extremities/MSK: Extremities normal/normal movement, atraumatic, no cyanosis  or edema.   Skin: Skin color, texture, turgor normal. No rashes or lesions.    Neurologic: Moving all extremities spontaneously, no focal deficit appreciated          LABS:   Lab Results   Component Value Date    WBC 7.4 05/15/2025    HGB 8.0 05/15/2025    HCT 23.4 05/15/2025    .0 05/15/2025    CREATSERUM 15.50 05/15/2025     05/15/2025     05/15/2025    K 4.1 05/15/2025     05/15/2025    CO2 16.0 05/15/2025     05/15/2025    CA 8.7 05/15/2025    ALB 4.2 05/15/2025    ALKPHO 113 05/15/2025    BILT 0.4 05/15/2025    TP 7.3 05/15/2025    AST 42 05/15/2025    ALT 38 05/15/2025       Radiology: MRI ABDOMEN AND MRCP W/3D (WPO=27612/28342)  Result Date: 5/9/2025  PROCEDURE:  MRI ABDOMEN&MRCP W/3D (CPT=74181/87651)  COMPARISON:  [facility], CT, CT CHEST+ABDOMEN+PELVIS(CPT=71250/50012), 4/19/2025, 11:47 PM.  [facility] , US, US ABDOMEN LIMITED (CPT=76705), 5/06/2025, 6:01 AM.  INDICATIONS:  Abdominal pain, eval CBD  TECHNIQUE:    Multiplanar single shot fast spin echo, chemical shift imaging, breath hold T2 weighted images and 2-D fiesta sequences were acquired. No contrast material was administered. Fluid sensitive imaging with MRCP. Reconstruction was also performed including 3D multi-projection imaging.  Both projection and source MRCP images are evaluated.  3-D RENDERING:   PATIENT STATED HISTORY:(As transcribed by Technologist)  eval CBD    FINDINGS:  LIVER:  No enlargement, atrophy, abnormal density, or significant focal lesion.  Diffuse low signal may represent hemosiderosis.  BILIARY:  No visible dilatation or filling defect.  PANCREAS:  No lesion, fluid collection, ductal dilatation, or atrophy.  SPLEEN:  No enlargement or focal lesion.  KIDNEYS:  No mass or obstruction.  Incidental tiny bilateral renal cysts. ADRENALS:  No mass or enlargement.  AORTA/VASCULAR:  No  aneurysm or dissection.  RETROPERITONEUM:  No mass or adenopathy.  BOWEL/MESENTERY:  No visible mass, obstruction, or bowel wall thickening.  ABDOMINAL WALL:  No mass or hernia.  PELVIC NODES:  Normal. BONES:  No bony lesion or fracture.  LUNG BASES:  No visible pleural disease.  Lung bases not well assessed with MRI.              CONCLUSION:  1. No acute abnormality demonstrated MRI of the abdomen and MRCP. 2. Diffuse low signal intensity of the liver correlates with relative high attenuation of liver seen on CT of 4/19/2025.  Differential considerations include hemosiderosis and amiodarone therapy.   LOCATION:  [facility]   Dictated by (CST): Ricki Zaragoza MD on 5/09/2025 at 7:34 AM     Finalized by (CST): Ricki Zaragoza MD on 5/09/2025 at 7:42 AM       US DUPLEX SCAN HEMODIALYSIS ACCESS  (CPT=93990)  Result Date: 5/7/2025  PROCEDURE:  US DUPLEX SCAN HEMODIALYSIS ACCESS  (CPT=93990)  COMPARISON:  [facility], US ARTERIAL DUPLEX UPPER EXTREMITY LEFT (CPT=93931), 4/23/2025, 2:07 PM.  INDICATIONS:  left arm swelling  TECHNIQUE:  Real time gray scale and duplex color Doppler sonography was used to evaluate the left upper extremity arterial and venous system. Being noted two-dimensional images of the vascular structures, Doppler spectral analysis, and color Doppler imaging were performed  PATIENT STATED HISTORY: (As transcribed by Technologist)  Left forearm pain.    FINDINGS:   Left proximal radial velocity is 212 centimeters/second proximal to the anastomosis. Left distal radial osseous 60 centimeters/second distal to the anastomosis.  The radiocephalic fistula arterial anastomosis velocity is 115 centimeters/second. Proximal fistula velocity is 156 centimeters/second. Mid fistula velocity 75 centimeter/second. Distal fistula is occluded at the level elbow at the venous anastomosis.  There is 4.2 cm of thrombus in the left fistula venous anastomosis extending to the cephalic vein.             CONCLUSION:  Similar findings  to prior examination.  However, thrombus measures 4.2 cm in the venous outflow tract versus 2.6 cm on prior exam.   LOCATION:  [facility]    Dictated by (CST): Ryan Hernández MD on 5/07/2025 at 4:24 PM     Finalized by (CST): Ryan Hernández MD on 5/07/2025 at 4:29 PM       US ABDOMEN LIMITED (CPT=76705)  Result Date: 5/6/2025  PROCEDURE:  US ABDOMEN LIMITED (CPT=76705)  COMPARISON:  [facility], , US ABDOMEN COMPLETE (CPT=76700), 6/01/2019, 8:15 AM.  INDICATIONS:  ruq us  PATIENT STATED HISTORY: (As transcribed by Technologist)     FINDINGS:  LIVER:  Normal size and echogenicity. No significant masses. BILIARY:  Mild gallbladder sludge.  Borderline dilation of the common bile duct measuring 7 mm.  No significant gallbladder wall thickening. PANCREAS:  Poorly assessed secondary to overlying bowel gas. OTHER:  Negative.            CONCLUSION:  Mild gallbladder sludge and borderline dilation of the common bile duct.    LOCATION:  [facility]   Dictated by (CST): Duy De La Rosa MD on 5/06/2025 at 7:20 AM     Finalized by (CST): Duy De La Rosa MD on 5/06/2025 at 7:22 AM       US VENOUS DOPPLER ARM LEFT - DIAG IMG (CPT=93971)  Result Date: 4/29/2025  PROCEDURE:  US VENOUS DOPPLER ARM LEFT - DIAG IMG (CPT=93971)  COMPARISON:  [facility], US VENOUS DOPPLER ARM LEFT - DIAG IMG (CPT=93971), 4/04/2025, 5:49 PM.  INDICATIONS:  Left upper extremity pain and swelling, neck swelling  TECHNIQUE:  Real time, grey scale, and duplex ultrasound was used to evaluate the upper extremity venous system. B-mode two-dimensional images of the vascular structures, Doppler spectral analysis, and color flow.  Doppler imaging were performed.  The following veins were imaged:  Subclavian, Jugular, Axillary, Brachial, Basilic, Cephalic, and the contralateral Subclavian and Jugular.  PATIENT STATED HISTORY: (As transcribed by Technologist)     FINDINGS:  EXTREMITY:  Left upper extremity THROMBI:  None visible. COMPRESSION:  Normal  compressibility, phasicity, and augmentation of the subclavian, jugular, axillary, brachial, basilic, and cephalic veins. OTHER:  Negative.            CONCLUSION:  No evidence of deep venous thrombosis of the left upper extremity   LOCATION:  [facility]   Dictated by (CST): Claude Singh MD on 4/29/2025 at 5:31 PM     Finalized by (CST): Claude Singh MD on 4/29/2025 at 5:32 PM       US HEAD/NECK (CPT=76536)  Result Date: 4/28/2025  PROCEDURE:  US HEAD/NECK (CPT=76536)  COMPARISON:  [facility] , XS, IR PERMANENT CATHETER INSERTION EXCHNGE CHECK, 4/22/2025, 8:57 AM.  INDICATIONS:  Palpable lump superior to PermCath insertion site.  TECHNIQUE:  Sonography was performed of the clinically requested area of interest.  PATIENT STATED HISTORY: (As transcribed by Technologist)     FINDINGS:  MASSES:  There is a hypoechoic lobulated structure within the subcutaneous tissue of the left neck along the region the palpable abnormality measuring 11 x 6 mm x 7 mm in size.  There is no flow within this lesion.  This could represent a focus of scar tissue or possibly a small hematoma. FLUID COLLECTIONS:  None. OTHER:  Negative.            CONCLUSION:  Hypoechoic nodular density correspond to palpable abnormality within the left neck subcutaneous tissues could represent a small hematoma or nodular focus of scar tissue.   LOCATION:  [facility]   Dictated by (CST): Jaycob Cassidy MD on 4/28/2025 at 5:00 PM     Finalized by (CST): Jaycob Cassidy MD on 4/28/2025 at 5:04 PM       XR CHEST AP PORTABLE  (CPT=71045)  Result Date: 4/27/2025  PROCEDURE:  XR CHEST AP PORTABLE  (CPT=71045)  TECHNIQUE:  AP chest radiograph was obtained.  COMPARISON:  [facility], XR, XR CHEST AP PORTABLE  (CPT=71045), 4/19/2025, 10:33 PM.  INDICATIONS:  New dialysis catheter with swelling and pain. Placed Thursday  PATIENT STATED HISTORY: (As transcribed by Technologist)    New dialysis catheter with swelling and pain. Placed Thursday .             CONCLUSION:  Central venous  catheter tips in SVC.  Stable airspace disease and effusion in the right midlung and base.  Stable heart size and pulmonary vascularity.  No pneumothorax.   LOCATION:  [facility]      Dictated by (CST): Arlyn Oglesby MD on 4/27/2025 at 7:34 AM     Finalized by (CST): Arlyn Oglesby MD on 4/27/2025 at 7:35 AM       US ARTERIAL DUPLEX UPPER EXTREMITY LEFT (CPT=93931)  Result Date: 4/23/2025  PROCEDURE:  US ARTERIAL DUPLEX UPPER EXTREMITY LEFT (CPT=93931)  COMPARISON:  None.  INDICATIONS:  Dialysis fistula malfunction  TECHNIQUE:  Real time grey scale and duplex ultrasound was used to evaluate the upper extremity arterial system. B-mode two dimensional images of the vascular structures, Doppler spectral analysis, and color flow Doppler imaging were performed.  PATIENT STATED HISTORY: (As transcribed by Technologist)  Unsuccessful dialysis access.    FINDINGS:  There is a radiocephalic fistula in the left upper extremity.  The radial artery is patent with flow velocity measuring up to 237 centimeters/second proximal to the anastomosis and cell approximately 77 centimeters/second distal to the anastomosis.  Arterial venous anastomosis is widely patent with flow rate measuring approximately 160 centimeters per 2nd.  The venous outflow is patent proximally, though appears occluded at the level of the elbow with intramural thrombus spanning a segment of approximately 2.6 centimeters.             CONCLUSION:  1. Left upper extremity fistula.  Approximately 2.6 cm segment of thrombus within the venous outflow tract the level of the elbow.  This corresponds to the recent fistulagram findings. 2. Arterial venous anastomosis is patent.   LOCATION:  [facility]    Dictated by ([facility]): Duy De La Rosa MD on 4/23/2025 at 3:25 PM     Finalized by (CST): Duy De La Rosa MD on 4/23/2025 at 3:29 PM       IR CENTRAL VENOUS ACCESS  Result Date: 4/22/2025            PROCEDURE:  IR PERMANENT CATHETER INSERTION EXCHNGE CHECK  INDICATIONS:  ESRD   TECHNIQUE:  Prior to the procedure, I discussed with the patient and/or legal representative the potential benefits, risks, and side effects of this procedure, the likelihood of the patient achieving goals; and the potential problems that might occur during recuperation.  I discussed reasonable alternatives to the procedure, including risks, benefits, steps to prevent infection and side effects related to the alternatives, and risks related to not receiving this procedure. A witnessed verbal and signed consent was obtained and documented in the patient's chart.  IV was checked and maintained by the nurse. Moderate conscious sedation was performed under continuous pulse oximetry and cardiac monitoring under my direct supervision.  The radiology nurse was in attendance throughout the exam. Moderate conscious sedation of 2 mg Versed and 100 mcg of Fentanyl was given.  Patient was assessed and monitoring of oxygen saturation, heart rate, and blood pressure by the nursing staff and myself during the exam for a total intraservice time of 12 minutes of conscious sedation time from 915 to 927.  Two grams of Ancef were given pre-procedurally via existing IV.  The patient was placed supine on the angiographic table and the left chest and neck was prepped and covered with a full body drape in the usual sterile fashion.  All operators present for the case performed standard pre-procedural prep including hand washing, sterile gloves, gown, mask, and cap.  All aspects of the maximum sterile barrier technique were followed.  A preprocedural time out was performed with all physicians, technologists, and nurses involved with the procedure. Ultrasound of the right neck demonstrated a severely stenotic right internal jugular vein. Sonography of the left  neck demonstrated a patent internal jugular vein and a permanent image was obtained.  Under sonographic guidance, the left   internal jugular vein was accessed using a micropuncture  system.  A guidewire was advanced into the IVC under fluoroscopic guidance.  Using blunt dissection a dual lumen tunneled catheter was placed via a peel-away sheath.  Both lumens flushed and returned easily.  The catheter was secured and heparinized.  A small amount of Dermabond was placed at the neck venotomy site.   Sterile gauze and transparent dressing was applied.  The patient tolerated the procedure well and there were no immediate complications. Routine post tunneled catheter insertion instructions were communicated to the patient.  ESTIMATED BLOOD LOSS: Less than 5cc.  FLUORO TIME/DOSE:  0.4 minutes  AIR KERMA:  5.84 mGy  IMPRESSION: Successful placement of left internal jugular vein tunneled dialysis catheter ( 27 cm tip to cuff Bard HemoSplit catheter)   LOCATION:  [facility]    Dictated by (CST): Duy De La Rosa MD on 4/22/2025 at 10:16 AM     Finalized by (CST): Duy De La Rosa MD on 4/22/2025 at 10:18 AM       IR GRAFT PROCEDURE  Result Date: 4/21/2025  PROCEDURE:  IR AV GRAFT DECLOT CHECK  INDICATIONS:  Left arm swelling  TECHNIQUE: Prior to the procedure, I discussed with the patient and/or legal representative the potential benefits, risks, and side effects of this procedure, the likelihood of the patient achieving goals; and the potential problems that might occur during recuperation.  I discussed reasonable alternatives to the procedure, including risks, benefits, steps to prevent infection and side effects related to the alternatives, and risks related to not receiving this procedure. A witnessed verbal and signed consent was obtained and documented in the patient's chart.  IV was checked and maintained by the nurse. Moderate conscious sedation was performed under continuous pulse oximetry and cardiac monitoring under my direct supervision.  The radiology nurse was in attendance throughout the exam. Moderate conscious sedation of 1 mg Versed and 50 mcg of Fentanyl was given.  Patient was assessed and  monitoring of oxygen saturation, heart rate, and blood pressure by the nursing staff and myself during the exam for a total intraservice time of 28 minutes of conscious sedation time from 12:26 p.m. to 12:54 p.m..  The patient was placed supine on the angiographic table and the left arm was prepped and covered with a full body drape in the usual sterile fashion.  All operators present for the case performed standard pre-procedural prep including hand washing, sterile gloves, gown, mask, and cap.  All aspects of the maximum sterile barrier technique were followed.  A preprocedural time out was performed with all physicians, technologists, and nurses involved with the procedure.  The shunt was accessed with a micropuncture system pointing toward the venous limb.  Shuntogram and central venograms were obtained.  SHUNTOGRAM:  The shunt was patent.  The axillary, subclavian, innominate vein and SVC were patent. There was a stenosis within the venous outflow at the level of the elbow.  Due to tortuosity proximal to the area of narrowing, a balloon catheter cannot be tracked over the wire to this location.   Catheters were removed and hemostasis was achieved with manual compression.  The patient tolerated the procedure well without any immediate procedural complication.  CONTRAST:  50 mL of Isovue-300  ESTIMATED BLOOD LOSS: Less than 5cc.  FLUOROSCOPY TIME/DOSE: 5.3  AIR KERMA: 17.91 mGy            CONCLUSION: 1. There is a narrowing within the venous outflow at the level the elbow.  Due to tortuosity proximal to this narrowing, a balloon catheter cannot be advanced into the location in order to angioplasty.  There is good flow through the fistula noted as there are numerous collaterals proximal to this narrowing. 2. After discussion with Dr. Coombs, an attempt for dialysis will be made through the fistula and if unsuccessful a tunneled dialysis catheter will be placed.   PLAN:  Patient will follow-up with nephrology and  at dialysis.  LOCATION:  [facility]       Dictated by (CST): Quinn Bernal MD on 4/21/2025 at 12:59 PM     Finalized by (CST): Quinn Bernal MD on 4/21/2025 at 1:15 PM       CT CHEST+ABDOMEN+PELVIS(CPT=71250/61707)  Result Date: 4/20/2025  PROCEDURE:  CT CHEST+ABDOMEN+PELVIS(CPT=71250/17737)  COMPARISON:  [facility], CT, CTA CHEST + CT ABD (W) + CT PEL (W) SH(CPT=71275/20133), 3/29/2025, 4:34 PM.  INDICATIONS:  abnl cxr, SOB, llq pain  TECHNIQUE:  Following oral contrast administration, unenhanced multislice CT scanning is performed through the chest, abdomen, and pelvis.  Dose reduction techniques were used. Dose information is transmitted to the ACR (American College of Radiology) NRDR (National Radiology Data Registry) which includes the Dose Index Registry.  PATIENT STATED HISTORY: (As transcribed by Technologist)  Patient reports shortness of breath, lower left abdominal pain and generalized weakness.    FINDINGS:   CHEST:  Left atrial enlargement.  No pericardial effusion.  Mitral valve prosthesis noted.  Mild coronary calcification.  Normal caliber of the thoracic aorta and pulmonary trunk.  Enlarged lymph nodes of the paratracheal station are stable, index right lower paratracheal node measuring 1.6 cm (series 2, image 45).  Normal thyroid and esophagus.  No central airway stenosis.  Stable right lung volume loss with areas of partial atelectasis or scar of the right middle and right lower lobes.  Upper lobe predominant emphysema.  Mild mosaic attenuation suggesting mild air trapping.  No acute airspace disease.  No new or enlarging pulmonary nodules.  Small right pleural effusion, similar to prior.  No pneumothorax.  Regional soft tissues are within normal limits.  No fracture or aggressive osseous lesion.  Suture anchors of the left humeral head noted.  ABDOMEN/PELVIS: Normal liver morphology.  Parenchymal hyperdensity of the liver may reflect sequela of amiodarone therapy.  Normal gallbladder, bile  ducts, spleen, pancreas, and adrenal glands.  Bilateral renal cortical atrophy.  Subtle hyperdensity of the right distal ureter near the UVJ (series 2, image 234, 235) may represent small ureteral calculi, though there is no evidence of collecting system obstruction.  Normal stomach.  No evidence of bowel inflammation or obstruction.  Normal appendix.  Pancolonic diverticulosis without evidence of acute diverticulitis.  Concentric bladder wall thickening, likely accentuated by under distention.  Normal prostate and seminal vesicles.  No ascites, pneumoperitoneum, or regional lymphadenopathy.  No abdominal aortic aneurysm.  Regional soft tissues are within normal limits.  Osteoarthritis of the hips and spine.  No aggressive osseous lesions.            CONCLUSION:   1. No acute process identified within the chest, abdomen, or pelvis.  2. Scarring and volume loss of the right lung, similar compared to 03/29/2025.  Stable small right pleural effusion.  Stable mediastinal lymphadenopathy.  3. Punctate hyperdensity of the right distal ureter near the UVJ may reflect urolithiasis, though without evidence of collecting system obstruction.  4. Pancolonic diverticulosis without evidence of acute diverticulitis.      LOCATION:  [facility]    Dictated by (CST): Claude Singh MD on 4/20/2025 at 0:37 AM     Finalized by (CST): Claude Singh MD on 4/20/2025 at 0:46 AM       XR CHEST AP PORTABLE  (CPT=71045)  Result Date: 4/19/2025  PROCEDURE:  XR CHEST AP PORTABLE  (CPT=71045)  TECHNIQUE:  AP chest radiograph was obtained.  COMPARISON:  [facility], XR, XR CHEST PA + LAT CHEST (UAO=01560), 4/04/2025, 5:25 PM.  INDICATIONS:  shortness of breath, abdominal pain, problem with fistula  PATIENT STATED HISTORY: (As transcribed by Technologist)  Patient has shortness of breath, dry cough, and chest tightness for around 2 weeks. Patient stated tonight he started having left sided abdomen pain.    FINDINGS:             CONCLUSION:  Stable cardiac  and mediastinal contours with valvular prosthesis noted.  There continues to be confluent opacification of the right lower lung which likely represents some combination of atelectasis, airspace disease, and pleural effusion.  No appreciable pneumothorax.   LOCATION:  [facility]      Dictated by (CST): Claude Singh MD on 4/19/2025 at 11:02 PM     Finalized by (CST): Claude Singh MD on 4/19/2025 at 11:03 PM            ASSESSMENT / PLAN:    45 year old male with PMH including but not limited to HTN, CHF, ESRD on HD, DM, bipolar do, hilario, here for abd pain and bloody BMs.       Abd pain and bloody BMs  - GI following, apprec, d/w Joe Salinas MD    - EGD last week, colonoscopy 3/2025 and capsule endoscopy 10/2024 all without any other source of bleeding other than his hemorrhoids   - f/u with colorectal Dr. Mccrary for hemorrhoidectomy as outpt   - anusol if needed  - last admit RUQ ultrasound - GB with sludge, borderline CBD; Mild lipase elevation but doubt pancreatitis - has had elevation previously   - MRCP without abd pathology; f/u findings in liver as o/p   - EGD 5/8 c mild esophagitis and a hiatal hernia. PPI and bentyl prn       Anemia  [facility]  - 2/2 above as well as ESRD  - hgb 7.9, was 9.0 5/9/25  - follow  - Continue ferrous sulfate (will darken stool as well)  - BUN elevated may indicate GIB but missed HD yesterday, will do today      ESRD on HD   secondary hyperparathyroidism  - HD MWF    -US of LUE fistula -->thrombus measures 4.2 cm in the venous outflow tract versus 2.6 cm on prior exam.   - VASCULAR notified previously, likely will need new fistula as o/p   - L arm precautions   - renal on, d/w Dr. Nava      Chronic HFpEF  HTN, uncontrolled  Hx of MV repair   -on multiple bp meds at home, cont   - Continue Coreg, clonidine, losartan, nifedipine, hydralazine statin      DM 2  -A1c improved now       MDD/HILARIO/BPD  - Continue Abilify, Prozac, Lamictal, Wellbutrin     Scds  Heparin on hold      Outpatient records or  previous hospital records reviewed. [facility] hospitalist to continue to follow patient while in house. A total of 75 minutes taken.  Greater than 50% face to face encounter.  D/w RN    Yuriy Forrest MD  [facility] Hospitalist  Pager: [phone]  5/15/2025  11:15 AM         [1]   Past Medical History:   Anxiety state    Arrhythmia    Asthma ([facility])    Attention deficit hyperactivity disorder (ADHD)    Back problem    Bipolar 1 disorder ([facility])    CKD (chronic kidney disease) stage 3, GFR 30-59 ml/min ([facility])    Dr Meeks    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[facility])    Congestive heart disease (HCC)    COPD (chronic obstructive pulmonary disease) ([facility])    Coronary atherosclerosis    Deep vein thrombosis ([facility])    at age 17 R/T cast    Depression    Diabetes ([facility])    Dialysis patient    Diverticulosis of large intestine    Essential hypertension    3/21 echo: severe concentric LVH with normal EF and no MR or pHTN    Extrinsic asthma, unspecified    Heart attack ([facility])    2016- angiogram- no intervention    Heart valve disease    mitral valve repair in 1994/    High blood pressure    High cholesterol    History of blood transfusion    History of mitral valve repair    Hyperlipidemia    Low back pain    tight and stiff after sweeping and mopping    LVH (left ventricular hypertrophy)    Migraines    Mixed hyperlipidemia     HDL 38 LDL 97 VLDL 57     Monoclonal gammopathy    IgG kappa     Muscle weakness    MVP (mitral valve prolapse)    Repair 1994 at [place]; echoes as recently as 3/21 show mild or trivial MR and no stenosis    Neuropathy    Osteoarthritis    hip ,knees    Pneumonia due to organism    Pulmonary embolism (HCC)    Renal disorder    Stroke (HCC)    TIA (transient ischemic attack)    Initial history of left-sided weakness and slurred speech. (+) cocaine. MRI of the brain, CT angiogram of the head and neck, and 2D echo are all unremarkable.     TMJ (dislocation of temporomandibular joint)    Troponin  level elevated    Trop 60 60 47 with TIA and no CP: Lexiscan negative with EF 51    Visual impairment   [2]   Past Surgical History:  Procedure Laterality Date    Av fistula revision, open Left     Cabg      Colonoscopy N/A 03/26/2023    Procedure: COLONOSCOPY;  Surgeon: Heath Vu MD;  Location:  ENDOSCOPY    Colonoscopy N/A 12/30/2023    Procedure: COLONOSCOPY with cold snare polypectomy and forcep polypectomy;  Surgeon: Ousmane Suarez MD;  Location:  ENDOSCOPY    Colonoscopy N/A 3/9/2025    Procedure: COLONOSCOPY WITH COLD SNARE POLYPECTOMY AND ENDO CLIPS X 2;  Surgeon: Bakari Noguera MD;  Location:  ENDOSCOPY    Colonoscopy & polypectomy  2019    Egd  2019    Duodenitis. Biopsied. EUS for weight loss was negative    Heart surgery      Hernia surgery  08/17/2022    Dr Barnes    Laminectomy,>2 sgmt,lumbar  09/06/2018    L4-L5 Decomp Discectomy ROEM L4-L5    Mitralplasty w cp bypass  1994    [place]: Repair    Repair rotator cuff,chronic Left     torn and had a ruptured bicep    Sinus surgery        Spine surgery procedure unlisted      Valve repair  1994    mitral valve   [3]   Allergies  Allergen Reactions    Hydrochlorothiazide RASH and HIVES   [4] ARIPiprazole, 15 mg, Daily  atorvastatin, 20 mg, QPM  buPROPion ER, 150 mg, Daily  calcium acetate, 667 mg, TID with meals  carvedilol, 25 mg, BID with meals  cloNIDine, 0.1 mg, BID  FLUoxetine, 40 mg, Daily  gabapentin, 200 mg, TID  hydrALAZINE, 25 mg, TID  lamoTRIgine, 100 mg, Daily  NIFEdipine ER, 30 mg, Daily  pantoprazole, 40 mg, QAM AC  sevelamer carbonate, 2,400 mg, TID CC  tamsulosin, 0.4 mg, Daily  umeclidinium bromide, 1 puff, Daily  amphetamine-dextroamphetamine, 10 mg, BID@0800,1200  [5]   Family History  Problem Relation Age of Onset    Hypertension Father     Alcohol and Other Disorders Associated Father     Substance Abuse Father         cocaine    Dementia Father     Cancer Father         lung    Diabetes Mother     Cancer Mother          multiple myeloma    Hypertension Mother     Anxiety Maternal Aunt     Depression Maternal Aunt     Anxiety Maternal Aunt     Depression Maternal Aunt     Bipolar Disorder Maternal Aunt     Diabetes Maternal Grandmother     Hypertension Maternal Grandmother     Cancer Maternal Grandfather         stomach cancer    Diabetes Maternal Grandfather     Hypertension Maternal Grandfather     Alcohol and Other Disorders Associated Maternal Grandfather     Hypertension Paternal Grandmother     Hypertension Paternal Grandfather     Cancer Sister         uterine and ovarian    Hypertension Sister     Cancer Maternal Uncle         lung    Cancer Paternal Aunt         throat

## 2025-05-15 NOTE — CONSULTS
[facility]   part of [facility]    Report of Consultation    Godwin Fonseca Patient Status:  Inpatient    1980 MRN [id number]   Location [facility] 5NW-A Attending Yuriy Forrest MD   Hosp Day # 0 PCP Adrian Small MD     Date of Admission:  5/15/2025  Date of Consult:  5/15/25    Reason for Consultation:  Anemia  GI bleeding    History of Present Illness:  Godwin Fonseca is a a(n) 45 year old male. Pt well known to our GI service for ongoing hemorrhoid bleeding on top of renal failure on HD both causing anemia.  Pt has baseline hgb 7-9 range due to renal failure and will have intermittent rectal bleeding  Has had numerous GI evaluations the last was EGD done just 1 week ago for bleeding which was negative for any bleeding source.  Colonoscopy 3/9/25 for rectal bleeding - diverticulosis and small polyps and hemorrhoids. Normal VCE 10/2024. Umbilical hernia repaired 2022   Pt has been seen by surgery for hemorrhoids and was recommended pt see Dr. Mccrary - colorectal surgeon at [facility] as outpt.  Pt has yet to schedule this.    Now had more rectal bleeding prior to admission.  Since admission, no further bleeding all night and this AM per nursing staff.  Hgb in ER 7.9    History:  Past Medical History[1]  Past Surgical History[2]  Family History[3]   reports that he quit smoking about 3 years ago. His smoking use included cigarettes. He started smoking about 30 years ago. He has a 27 pack-year smoking history. He has never been exposed to tobacco smoke. He has never used smokeless tobacco. He reports that he does not drink alcohol and does not use drugs.    Allergies:  Allergies[4]    Medications:  Current Hospital Medications[5]    Review of Systems:  GENERAL: feels well otherwise  SKIN: neg  EYES: neg  HEENT: neg  LUNGS: neg  CARDIOVASCULAR: neg  GI: as above  NEURO: neg  PSYCHE:  neg    Physical Exam:    Blood pressure (!) 176/110, pulse 105, temperature 98.7 °F (37.1 °C), temperature  source Oral, resp. rate (!) 27, height 74\", weight 234 lb (106.1 kg), SpO2 99%.  GENERAL: well developed, well nourished, in no apparent distress  SKIN: no rashes, no jaundice  HEENT: atraumatic, normocephalic,  EYES:  no icterus  CHEST: no chest tenderness  LUNGS: clear to auscultation  CARDIO: RRR without murmur  GI: good BS's and no masses, HSM or tenderness  EXTREMITIES: no edema  NEURO: Oriented times three    Laboratory Data:  Lab Results   Component Value Date    WBC 7.4 05/15/2025    HGB 7.9 05/15/2025    HCT 23.4 05/15/2025    .0 05/15/2025    CREATSERUM 15.50 05/15/2025     05/15/2025     05/15/2025    K 4.1 05/15/2025     05/15/2025    CO2 16.0 05/15/2025     05/15/2025    CA 8.7 05/15/2025    ALB 4.2 05/15/2025    ALKPHO 113 05/15/2025    BILT 0.4 05/15/2025    TP 7.3 05/15/2025    AST 42 05/15/2025    ALT 38 05/15/2025       Imaging:      Assessment/Plan:  Problem List[6]    This is a 46 y/o well known to our GI team for hemorrhoid bleeding and anemia with baseline hgb 7-9 range from renal failure.  EGD last week, colonoscopy 3/2025 and capsule endoscopy 10/2024 all without any other source of bleeding other than his hemorrhoids  Pt has been seen by surgery team before and they recommended f/u with Dr. Mccrary - colorectal surgery at [facility].  Pt yet to schedule.    No further rectal bleeding since admission per nursing staff.    No further inpt GI plans at this time.  Pt to schedule outpt appointment with Dr. Mccrary from surgery.      Joe Salinas MD  5/15/2025  9:18 AM       [1]   Past Medical History:   Anxiety state    Arrhythmia    Asthma (HCC)    Attention deficit hyperactivity disorder (ADHD)    Back problem    Bipolar 1 disorder (HCC)    CKD (chronic kidney disease) stage 3, GFR 30-59 ml/min (HCC)    Dr Alausa    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HCC)    Congestive heart disease (HCC)    COPD (chronic obstructive pulmonary disease) (HCC)     Coronary atherosclerosis    Deep vein thrombosis (HCC)    at age 17 R/T cast    Depression    Diabetes (HCC)    Dialysis patient    Diverticulosis of large intestine    Essential hypertension    3/21 echo: severe concentric LVH with normal EF and no MR or pHTN    Extrinsic asthma, unspecified    Heart attack (HCC)    2016- angiogram- no intervention    Heart valve disease    mitral valve repair in 1994/    High blood pressure    High cholesterol    History of blood transfusion    History of mitral valve repair    Hyperlipidemia    Low back pain    tight and stiff after sweeping and mopping    LVH (left ventricular hypertrophy)    Migraines    Mixed hyperlipidemia     HDL 38 LDL 97 VLDL 57     Monoclonal gammopathy    IgG kappa     Muscle weakness    MVP (mitral valve prolapse)    Repair 1994 at [place]; echoes as recently as 3/21 show mild or trivial MR and no stenosis    Neuropathy    Osteoarthritis    hip ,knees    Pneumonia due to organism    Pulmonary embolism (HCC)    Renal disorder    Stroke (HCC)    TIA (transient ischemic attack)    Initial history of left-sided weakness and slurred speech. (+) cocaine. MRI of the brain, CT angiogram of the head and neck, and 2D echo are all unremarkable.     TMJ (dislocation of temporomandibular joint)    Troponin level elevated    Trop 60 60 47 with TIA and no CP: Lexiscan negative with EF 51    Visual impairment   [2]   Past Surgical History:  Procedure Laterality Date    Av fistula revision, open Left     Cabg      Colonoscopy N/A 03/26/2023    Procedure: COLONOSCOPY;  Surgeon: Heath Vu MD;  Location:  ENDOSCOPY    Colonoscopy N/A 12/30/2023    Procedure: COLONOSCOPY with cold snare polypectomy and forcep polypectomy;  Surgeon: Ousmane Suarez MD;  Location:  ENDOSCOPY    Colonoscopy N/A 3/9/2025    Procedure: COLONOSCOPY WITH COLD SNARE POLYPECTOMY AND ENDO CLIPS X 2;  Surgeon: Bakari Noguera MD;  Location:  ENDOSCOPY    Colonoscopy &  polypectomy  2019    Egd  2019    Duodenitis. Biopsied. EUS for weight loss was negative    Heart surgery      Hernia surgery  08/17/2022    Dr Barnes    Laminectomy,>2 sgmt,lumbar  09/06/2018    L4-L5 Decomp Discectomy ROEM L4-L5    Mitralplasty w cp bypass  1994    [place]: Repair    Repair rotator cuff,chronic Left     torn and had a ruptured bicep    Sinus surgery        Spine surgery procedure unlisted      Valve repair  1994    mitral valve   [3]   Family History  Problem Relation Age of Onset    Hypertension Father     Alcohol and Other Disorders Associated Father     Substance Abuse Father         cocaine    Dementia Father     Cancer Father         lung    Diabetes Mother     Cancer Mother         multiple myeloma    Hypertension Mother     Anxiety Maternal Aunt     Depression Maternal Aunt     Anxiety Maternal Aunt     Depression Maternal Aunt     Bipolar Disorder Maternal Aunt     Diabetes Maternal Grandmother     Hypertension Maternal Grandmother     Cancer Maternal Grandfather         stomach cancer    Diabetes Maternal Grandfather     Hypertension Maternal Grandfather     Alcohol and Other Disorders Associated Maternal Grandfather     Hypertension Paternal Grandmother     Hypertension Paternal Grandfather     Cancer Sister         uterine and ovarian    Hypertension Sister     Cancer Maternal Uncle         lung    Cancer Paternal Aunt         throat   [4]   Allergies  Allergen Reactions    Hydrochlorothiazide RASH and HIVES   [5]   Current Facility-Administered Medications:     albuterol (Ventolin HFA) 108 (90 Base) MCG/ACT inhaler 2 puff, 2 puff, Inhalation, Q6H PRN    ARIPiprazole (Abilify) tab 15 mg, 15 mg, Oral, Daily    atorvastatin (Lipitor) tab 20 mg, 20 mg, Oral, QPM    buPROPion ER (Wellbutrin XL) 24 hr tab 150 mg, 150 mg, Oral, Daily    calcium acetate (Phoslo) cap 667 mg, 667 mg, Oral, TID with meals    carvedilol (Coreg) tab 25 mg, 25 mg, Oral, BID with meals    cloNIDine (Catapres) tab 0.1  mg, 0.1 mg, Oral, BID    FLUoxetine (PROzac) cap 40 mg, 40 mg, Oral, Daily    gabapentin (Neurontin) cap 200 mg, 200 mg, Oral, TID    hydrALAZINE (Apresoline) tab 25 mg, 25 mg, Oral, TID    HYDROcodone-acetaminophen (Norco) 5-325 MG per tab 1 tablet, 1 tablet, Oral, Q6H PRN    lamoTRIgine (LaMICtal) tab 100 mg, 100 mg, Oral, Daily    NIFEdipine ER (Procardia-XL) 24 hr tab 30 mg, 30 mg, Oral, Daily    pantoprazole (Protonix) DR tab 40 mg, 40 mg, Oral, QAM AC    sevelamer carbonate (Renvela) tab 2,400 mg, 2,400 mg, Oral, TID CC    tamsulosin (Flomax) cap 0.4 mg, 0.4 mg, Oral, Daily    umeclidinium bromide (Incruse Ellipta) 62.5 MCG/ACT inhaler 1 puff, 1 puff, Inhalation, Daily    acetaminophen (Tylenol Extra Strength) tab 500 mg, 500 mg, Oral, Q4H PRN    melatonin tab 3 mg, 3 mg, Oral, Nightly PRN    polyethylene glycol (PEG 3350) (Miralax) 17 g oral packet 17 g, 17 g, Oral, Daily PRN    sennosides (Senokot) tab 17.2 mg, 17.2 mg, Oral, Nightly PRN    bisacodyl (Dulcolax) 10 MG rectal suppository 10 mg, 10 mg, Rectal, Daily PRN    ondansetron (Zofran) 4 MG/2ML injection 4 mg, 4 mg, Intravenous, Q6H PRN    prochlorperazine (Compazine) 10 MG/2ML injection 5 mg, 5 mg, Intravenous, Q8H PRN    morphINE PF 4 MG/ML injection 2 mg, 2 mg, Intravenous, Q4H PRN    amphetamine-dextroamphetamine (Adderall) tab 10 mg, 10 mg, Oral, BID@0800,1200  [6]   Patient Active Problem List  Diagnosis    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)    CKD (chronic kidney disease) stage 3, GFR 30-59 ml/min (HCC)    Self-inflicted injury    Bipolar disorder in partial remission, most recent episode unspecified type (HCC)    Family history of prostate cancer    Fatigue,  unspecified type    Alkaline phosphatase elevation    Hyperlipidemia, mixed    Low serum HDL    Spinal stenosis of lumbar region with neurogenic claudication    Prolapsed lumbar disc    Status post lumbar surgery    Cauda equina syndrome (HCC)    Lumbar disc prolapse with compression radiculopathy    Syncope and collapse    Hypokalemia    Orthostatic hypotension    Hypertension, unspecified type    Weight loss    Chest pain, unspecified type    History of mitral valve stenosis    Acute pericarditis, unspecified type (HCC)    Cervical radiculopathy    Elevated blood protein    IgG monoclonal protein disorder    MGUS (monoclonal gammopathy of unknown significance)    Hypertensive urgency    Bipolar 2 disorder (HCC)    Employment problem    Stress    Anxiety disorder, unspecified type    Weakness of left lower extremity    Rectal bleeding    Paresthesia of foot, bilateral    Obesity (BMI 30-39.9)    TIA (transient ischemic attack)    TMJ dysfunction    Inverted papilloma of nasal cavity    Type 2 diabetes mellitus with stage 3b chronic kidney disease, without long-term current use of insulin (HCC)    Acute kidney injury    Metabolic acidosis    Hyperglycemia    Chronic kidney disease, unspecified CKD stage    Abdominal distension    SOB (shortness of breath)    Hypertensive crisis    Acute on chronic congestive heart failure, unspecified heart failure type (HCC)    Acute congestive heart failure (HCC)    Acute congestive heart failure, unspecified heart failure type (HCC)    Acute on chronic diastolic congestive heart failure (HCC)    Stage 4 chronic kidney disease (HCC)    ROBERT (acute kidney injury)    Abdominal pain, left lower quadrant    Hypertensive emergency    Acute chest pain    Acute on chronic renal insufficiency    Chronic abdominal pain    Nonintractable headache, unspecified chronicity pattern, unspecified headache type    Chronic right shoulder pain    HCAP (healthcare-associated pneumonia)    Acute  intractable headache, unspecified headache type    ESRD (end stage renal disease) on dialysis ([facility])    Diarrhea, unspecified type    Primary hypertension    Dyspnea, unspecified type    Abdominal pain, acute    ESRD on dialysis ([facility])    Fluid overload    ESRD needing dialysis ([facility])    COVID-19 virus infection    Hypotension, unspecified hypotension type    Anemia    Acute renal failure (ARF)    Neck pain    Acute nonintractable headache, unspecified headache type    GI bleed    Chronic kidney disease with end stage renal failure on dialysis ([facility])    Lower abdominal pain    ESRD (end stage renal disease) ([facility])    Resistant hypertension    Community acquired pneumonia of right lower lobe of lung    Acute renal failure with acute renal cortical necrosis superimposed on stage 4 chronic kidney disease ([facility])    Acute renal failure, unspecified acute renal failure type    Hypervolemia, unspecified hypervolemia type    End stage renal disease on dialysis ([facility])    Acute respiratory failure with hypoxia ([facility])    Stage 5 chronic kidney disease on chronic dialysis ([facility])    Anemia, unspecified type    Hyperkalemia    Hemodialysis catheter infection, initial encounter    Type 2 diabetes mellitus with chronic kidney disease on chronic dialysis, without long-term current use of insulin ([facility])    Coronary artery disease involving native coronary artery of native heart without angina pectoris    Pneumonia of right lower lobe due to infectious organism    Expressive aphasia    Uncontrolled hypertension    Bipolar disorder with moderate depression ([facility])    BRBPR (bright red blood per rectum)    Weakness    Nosebleed    ESRD on hemodialysis (HCC)    Acute colitis    Chest pain of uncertain etiology    Medically noncompliant    Dialysis patient, noncompliant    History of lower GI bleeding    Fever, unspecified fever cause    Nosocomial pneumonia    Nausea and vomiting in adult    Anemia due to chronic kidney disease, on chronic dialysis  (HCC)    Chronic renal failure (CRF), stage 5 (HCC)    Diverticulosis of colon with hemorrhage    Shortness of breath    Elevated troponin    Elevated brain natriuretic peptide (BNP) level    Syncope, vasovagal    Non-compliance with renal dialysis    Left sided abdominal pain    RAMSAY (dyspnea on exertion)    Left-sided chest wall pain    Intractable abdominal pain    Nausea vomiting and diarrhea    End stage renal disease (HCC)    Renovascular hypertension

## 2025-05-15 NOTE — CM/SW NOTE
05/15/25 1100   Readmission Assessment   Factors that patient feels contributed to this readmission Acute/Chronic Clinical Presentation   Pt's living situation prior to admission? Home with family   Pt's level of independence at discharge? No assist/independent (minimal)   Pt. received education on diagnoses at time of discharge? Yes   Did you know who and how to call someone if you felt worse? Yes     Pt admitted to [facility] due to GIB- he had recent admit and colonoscopy.  Pt supposed to f/up with OP Colon- rectal surgeon. He has hx of ESRD- HD M-W-F at [facility].    No dc needs.    Frances Calderon LCSW  /Discharge Planner

## 2025-05-15 NOTE — PHYSICAL THERAPY NOTE
PHYSICAL THERAPY EVALUATION - INPATIENT     Room Number: 508/508-A  Evaluation Date: 5/15/2025  Type of Evaluation: Initial  Physician Order: PT Eval and Treat    Presenting Problem: rectal bleeding, abdominal and back pain  Co-Morbidities : bipolar 1 disorder, ADHD, CKD, MI, HTN, spine surgery, DVT, COPD, PE, stroke, valve repair, CABG  Reason for Therapy: Mobility Dysfunction and Discharge Planning    PHYSICAL THERAPY ASSESSMENT   Patient is a 45 year old male admitted 5/15/2025 for rectal bleeding, abdominal pain and back pain.  Prior to admission, patient's baseline is independent with no AD.  Patient is currently functioning below baseline with bed mobility, transfers, gait, and stair negotiation.  Patient is requiring contact guard assist as a result of the following impairments: decreased functional strength, decreased endurance/aerobic capacity, pain, impaired   balance, and decreased muscular endurance.  Physical Therapy will continue to follow for duration of hospitalization.    Patient will benefit from continued skilled PT Services at discharge to promote prior level of function.  Anticipate patient will return home with home health PT.    PLAN DURING HOSPITALIZATION  Nursing Mobility Recommendation : 1 Assist  PT Device Recommendation: Rolling walker  PT Treatment Plan: Bed mobility, Endurance, Energy conservation, Patient education, Family education, Gait training, Strengthening, Stair training, Transfer training, Balance training  Rehab Potential : Good  Frequency (Obs): 3-5x/week     CURRENT GOALS    Goal #1 Patient is able to demonstrate supine - sit EOB @ level: supervision     Goal #2 Patient is able to demonstrate transfers Sit to/from Stand at assistance level: supervision     Goal #3 Patient is able to ambulate 150 feet with assist device: walker - rolling at assistance level: supervision     Goal #4 Patient is able to stair climb a flight of stairs with supervision   Goal #5    Goal #6    Goal  Comments: Goals established on 5/15/2025      PHYSICAL THERAPY MEDICAL/SOCIAL HISTORY  History related to current admission: Patient is a 45 year old male admitted on 5/15/2025 from home for rectal bleeding, abdominal pain and back pain.    HOME SITUATION  Type of Home: House  Home Layout: Two level                     Lives With:  (mother)    Drives: Yes   Patient Regularly Uses: Glasses (has RW and cane)      Prior Level of Gray: Pt reports he is typically independent with ADLs, ambulates with no AD, and drives. Pt reports increased difficulty with stairs recently.     SUBJECTIVE  Pt pleasant and cooperative    OBJECTIVE  Precautions: Bed/chair alarm  Fall Risk: High fall risk    WEIGHT BEARING RESTRICTION     PAIN ASSESSMENT  Ratin  Location: back and abdomen  Management Techniques: Activity promotion, Relaxation, Repositioning    COGNITION  Overall Cognitive Status:  WFL - within functional limits    RANGE OF MOTION AND STRENGTH ASSESSMENT  Upper extremity ROM and strength are within functional limits     Lower extremity ROM is within functional limits     Lower extremity strength is within functional limits     BALANCE  Static Sitting: Good  Dynamic Sitting: Good  Static Standing: Fair -  Dynamic Standing: Poor +    ADDITIONAL TESTS                                    ACTIVITY TOLERANCE                         O2 WALK       NEUROLOGICAL FINDINGS                        AM-PAC '6-Clicks' INPATIENT SHORT FORM - BASIC MOBILITY  How much difficulty does the patient currently have...  Patient Difficulty: Turning over in bed (including adjusting bedclothes, sheets and blankets)?: None   Patient Difficulty: Sitting down on and standing up from a chair with arms (e.g., wheelchair, bedside commode, etc.): A Little   Patient Difficulty: Moving from lying on back to sitting on the side of the bed?: None   How much help from another person does the patient currently need...   Help from Another: Moving to and from  a bed to a chair (including a wheelchair)?: A Little   Help from Another: Need to walk in hospital room?: A Little   Help from Another: Climbing 3-5 steps with a railing?: A Little     AM-PAC Score:  Raw Score: 20   Approx Degree of Impairment: 35.83%   Standardized Score (AM-PAC Scale): 47.67   CMS Modifier (G-Code): CJ    FUNCTIONAL ABILITY STATUS  Gait Assessment   Functional Mobility/Gait Assessment  Gait Assistance: Contact guard assist  Distance (ft): 75  Assistive Device: Rolling walker  Pattern: Within Functional Limits    Skilled Therapy Provided: Per RN justin to work with pt. Pt received in supine and was agreeable to PT session.     Bed Mobility:  Rolling: mod ind   Supine to sit: supervision   Sit to supine: NT     Transfer Mobility:  Sit to stand: CGA   Stand to sit: CGA  Gait = Pt ambulated with RW and CGA    Therapist's Comments: Pt educated on role of therapy, goals for session, safety, fall prevention, and activity recommendations.     Exercise/Education Provided:  Bed mobility  Body mechanics  Energy conservation  Functional activity tolerated  Gait training  Posture  Strengthening  Transfer training    Patient End of Session: Up in chair, Needs met, Call light within reach, RN aware of session/findings, All patient questions and concerns addressed, Hospital anti-slip socks, Alarm set      Patient Evaluation Complexity Level:  History Moderate - 1 or 2 personal factors and/or co-morbidities   Examination of body systems Low -  addressing 1-2 elements   Clinical Presentation Low- Stable   Clinical Decision Making Low Complexity       PT Session Time: 25 minutes  Therapeutic Activity: 8 minutes

## 2025-05-15 NOTE — ED INITIAL ASSESSMENT (HPI)
Rectal bleeding with abdominal and back pain since yesterday . No emesis . No diarrhea .  On hemodialysis - last HD Wednesday .

## 2025-05-15 NOTE — PROGRESS NOTES
Fax from pharmacy asking for RX for Meloxicam 15 mg tab qty 90. Unable to load from current medication list. 1 tab once daily   Patient A&Ox4. Patient resting in bed, no apparent distress. Patient resting on room air. Patient  on tele/, vital sign monitoring.     Patient with reported pain, PRN medication given, see MAR for more details.       Limb precautions  Safety/fall precautions in place. Call light in reach.  Admission navigator completed, PTA med list reviewed with patient, MD notified to review meds.   Nephrology notified of consult

## 2025-05-16 VITALS
SYSTOLIC BLOOD PRESSURE: 133 MMHG | OXYGEN SATURATION: 96 % | RESPIRATION RATE: 19 BRPM | BODY MASS INDEX: 30.03 KG/M2 | TEMPERATURE: 99 F | WEIGHT: 234 LBS | DIASTOLIC BLOOD PRESSURE: 83 MMHG | HEIGHT: 74 IN | HEART RATE: 84 BPM

## 2025-05-16 LAB
ANION GAP SERPL CALC-SCNC: 9 MMOL/L (ref 0–18)
BASOPHILS # BLD AUTO: 0.06 X10(3) UL (ref 0–0.2)
BASOPHILS NFR BLD AUTO: 1.1 %
BUN BLD-MCNC: 52 MG/DL (ref 9–23)
CALCIUM BLD-MCNC: 8.1 MG/DL (ref 8.7–10.6)
CHLORIDE SERPL-SCNC: 107 MMOL/L (ref 98–112)
CO2 SERPL-SCNC: 21 MMOL/L (ref 21–32)
CREAT BLD-MCNC: 9.32 MG/DL (ref 0.7–1.3)
EGFRCR SERPLBLD CKD-EPI 2021: 6 ML/MIN/1.73M2 (ref 60–?)
EOSINOPHIL # BLD AUTO: 0.34 X10(3) UL (ref 0–0.7)
EOSINOPHIL NFR BLD AUTO: 6.4 %
ERYTHROCYTE [DISTWIDTH] IN BLOOD BY AUTOMATED COUNT: 13.5 %
GLUCOSE BLD-MCNC: 107 MG/DL (ref 70–99)
HCT VFR BLD AUTO: 21.8 % (ref 39–53)
HCT VFR BLD AUTO: 23.3 % (ref 39–53)
HGB BLD-MCNC: 7.5 G/DL (ref 13–17.5)
HGB BLD-MCNC: 7.9 G/DL (ref 13–17.5)
HGB BLD-MCNC: 8 G/DL (ref 13–17.5)
IMM GRANULOCYTES # BLD AUTO: 0.01 X10(3) UL (ref 0–1)
IMM GRANULOCYTES NFR BLD: 0.2 %
LYMPHOCYTES # BLD AUTO: 1.21 X10(3) UL (ref 1–4)
LYMPHOCYTES NFR BLD AUTO: 22.8 %
MAGNESIUM SERPL-MCNC: 1.8 MG/DL (ref 1.6–2.6)
MCH RBC QN AUTO: 31.5 PG (ref 26–34)
MCHC RBC AUTO-ENTMCNC: 34.4 G/DL (ref 31–37)
MCV RBC AUTO: 91.6 FL (ref 80–100)
MONOCYTES # BLD AUTO: 0.62 X10(3) UL (ref 0.1–1)
MONOCYTES NFR BLD AUTO: 11.7 %
NEUTROPHILS # BLD AUTO: 3.06 X10 (3) UL (ref 1.5–7.7)
NEUTROPHILS # BLD AUTO: 3.06 X10(3) UL (ref 1.5–7.7)
NEUTROPHILS NFR BLD AUTO: 57.8 %
OSMOLALITY SERPL CALC.SUM OF ELEC: 299 MOSM/KG (ref 275–295)
PLATELET # BLD AUTO: 147 10(3)UL (ref 150–450)
POTASSIUM SERPL-SCNC: 3.8 MMOL/L (ref 3.5–5.1)
RBC # BLD AUTO: 2.38 X10(6)UL (ref 4.3–5.7)
SODIUM SERPL-SCNC: 137 MMOL/L (ref 136–145)
WBC # BLD AUTO: 5.3 X10(3) UL (ref 4–11)

## 2025-05-16 PROCEDURE — 85018 HEMOGLOBIN: CPT | Performed by: INTERNAL MEDICINE

## 2025-05-16 PROCEDURE — 90935 HEMODIALYSIS ONE EVALUATION: CPT | Performed by: INTERNAL MEDICINE

## 2025-05-16 PROCEDURE — 94640 AIRWAY INHALATION TREATMENT: CPT

## 2025-05-16 PROCEDURE — 96376 TX/PRO/DX INJ SAME DRUG ADON: CPT

## 2025-05-16 PROCEDURE — 36415 COLL VENOUS BLD VENIPUNCTURE: CPT | Performed by: INTERNAL MEDICINE

## 2025-05-16 PROCEDURE — 83735 ASSAY OF MAGNESIUM: CPT | Performed by: INTERNAL MEDICINE

## 2025-05-16 PROCEDURE — 85014 HEMATOCRIT: CPT | Performed by: INTERNAL MEDICINE

## 2025-05-16 PROCEDURE — 80048 BASIC METABOLIC PNL TOTAL CA: CPT | Performed by: INTERNAL MEDICINE

## 2025-05-16 PROCEDURE — 85025 COMPLETE CBC W/AUTO DIFF WBC: CPT | Performed by: INTERNAL MEDICINE

## 2025-05-16 RX ORDER — HEPARIN SODIUM 1000 [USP'U]/ML
1.5 INJECTION, SOLUTION INTRAVENOUS; SUBCUTANEOUS
Status: COMPLETED | OUTPATIENT
Start: 2025-05-16 | End: 2025-05-16

## 2025-05-16 RX ORDER — IPRATROPIUM BROMIDE AND ALBUTEROL SULFATE 2.5; .5 MG/3ML; MG/3ML
3 SOLUTION RESPIRATORY (INHALATION) DAILY PRN
Status: DISCONTINUED | OUTPATIENT
Start: 2025-05-16 | End: 2025-05-16

## 2025-05-16 RX ORDER — ALBUMIN (HUMAN) 12.5 G/50ML
25 SOLUTION INTRAVENOUS
Status: DISCONTINUED | OUTPATIENT
Start: 2025-05-16 | End: 2025-05-16

## 2025-05-16 RX ORDER — HEPARIN SODIUM 1000 [USP'U]/ML
5000 INJECTION, SOLUTION INTRAVENOUS; SUBCUTANEOUS
Status: DISCONTINUED | OUTPATIENT
Start: 2025-05-16 | End: 2025-05-16

## 2025-05-16 NOTE — PLAN OF CARE
Pt is a&ox4. L limb precaution d/t av fistula. L permacath. RA. NSR, ST on tele. Voids per BRP, up SBA. Tolerating regular diet. HD today. Pt updated on poc. No further needs at this time.     Problem: PAIN - ADULT  Goal: Verbalizes/displays adequate comfort level or patient's stated pain goal  Description: INTERVENTIONS:  - Encourage pt to monitor pain and request assistance  - Assess pain using appropriate pain scale  - Administer analgesics based on type and severity of pain and evaluate response  - Implement non-pharmacological measures as appropriate and evaluate response  - Consider cultural and social influences on pain and pain management  - Manage/alleviate anxiety  - Utilize distraction and/or relaxation techniques  - Monitor for opioid side effects  - Notify MD/LIP if interventions unsuccessful or patient reports new pain  - Anticipate increased pain with activity and pre-medicate as appropriate  Outcome: Progressing

## 2025-05-16 NOTE — PROGRESS NOTES
[facility]   part of [facility]    Nephrology Progress Note    Godwin Fonseca Attending:  Yuriy Forrest MD     Cc: esrd    SUBJECTIVE     Sp HD late last night   No complaints now     PHYSICAL EXAM   Vital signs: BP (!) 144/98   Pulse 91   Temp 97.9 °F (36.6 °C) (Oral)   Resp 20   Ht 6' 2\" (1.88 m)   Wt 234 lb (106.1 kg)   SpO2 95%   BMI 30.04 kg/m²   Temp (24hrs), Av.9 °F (36.6 °C), Min:97.7 °F (36.5 °C), Max:98.1 °F (36.7 °C)       Intake/Output Summary (Last 24 hours) at 2025 1025  Last data filed at 2025 0455  Gross per 24 hour   Intake --   Output 1250 ml   Net -1250 ml     Wt Readings from Last 3 Encounters:   05/15/25 234 lb (106.1 kg)   25 237 lb 7 oz (107.7 kg)   25 237 lb 7 oz (107.7 kg)     General: NAD  HEENT: NCAT, EOMI, MMM  Neck: Supple   Cardiac: Regular rate and rhythm   Lungs: CTAB  Abdomen: Soft, non-tender, nondistended   Extremities: No edema  Neurologic: No asterxis  Skin: Warm and dry, no rashes     MEDS   Current Hospital Medications[1]    LABS     Lab Results   Component Value Date    WBC 5.3 2025    HGB 7.5 2025    HCT 21.8 2025    .0 2025    CREATSERUM 9.32 2025    BUN 52 2025     2025    K 3.8 2025     2025    CO2 21.0 2025     2025    CA 8.1 2025    MG 1.8 2025       IMAGING   All imaging studies personally reviewed.    No orders to display         ASSESSMENT & PLAN   45 year old male w ho ESRD on iHD MWF via permcath (clotted/failed LUE AVF), HTN, severe MR s/p MVR x 2, HFpEF, DVT/PE, TIA, MGUS, bipolar disorder, recurrent GI bleed, diffuse body pains who presents with bloody bowel movements.      ESRD:  -- HD MWF per outpatient schedule  -- missed HD Wednesday. sp HD Thurs w 0L UF.   -- plan for HD today to keep on MWF schedule, 3K, UF 2-3L as tolerated. HD directly managed.   -- avoid morphine  -- gabapentin should be renally dosed      Acidosis  -- HD     Anemia:  -- epo when BPs allow  -- transfusion prn      MBD / Hyperphosphatemia  -- trend phos  -- sevelamer 2400 TID AC  -- calcium acetate 667 TIDAC  -- low phos diet     HTN:  -- clonidine should be continued BID to avoid rebound  -- takes remaining antihypertensives after HD but ok to hold hydralazine if there is a concern for dropping too much BP after HD; hold antihypertensives (except clonidine per above) pre HD for now since he has dropped BP with UF in the past here     LUE AVF with arm swelling:  -- L arm precautions  -- US duplex dialysis access with thrombus - on last admit our team discussed with vascular surgery - he has a failing fistula; no further eval/intervention planned for current fistula; patient will need outpatient follow up with vascular surgery for new AVF eval       Thank you for allowing me to participate in the care of this patient. Please do not hesitate to call with questions or concerns.       Samaria Nava MD  [facility] Nephrology         [1]   Current Facility-Administered Medications   Medication Dose Route Frequency    ipratropium-albuterol (Duoneb) 0.5-2.5 (3) MG/3ML inhalation solution 3 mL  3 mL Nebulization Daily PRN    sodium chloride 0.9 % IV bolus 100 mL  100 mL Intravenous Q30 Min PRN    And    albumin human (Albumin) 25% injection 25 g  25 g Intravenous PRN Dialysis    albuterol (Ventolin HFA) 108 (90 Base) MCG/ACT inhaler 2 puff  2 puff Inhalation Q6H PRN    ARIPiprazole (Abilify) tab 15 mg  15 mg Oral Daily    atorvastatin (Lipitor) tab 20 mg  20 mg Oral QPM    buPROPion ER (Wellbutrin XL) 24 hr tab 150 mg  150 mg Oral Daily    calcium acetate (Phoslo) cap 667 mg  667 mg Oral TID with meals    carvedilol (Coreg) tab 25 mg  25 mg Oral BID with meals    cloNIDine (Catapres) tab 0.1 mg  0.1 mg Oral BID    FLUoxetine (PROzac) cap 40 mg  40 mg Oral Daily    gabapentin (Neurontin) cap 200 mg  200 mg Oral TID    hydrALAZINE (Apresoline) tab  25 mg  25 mg Oral TID    HYDROcodone-acetaminophen (Norco) 5-325 MG per tab 1 tablet  1 tablet Oral Q6H PRN    lamoTRIgine (LaMICtal) tab 100 mg  100 mg Oral Daily    NIFEdipine ER (Procardia-XL) 24 hr tab 30 mg  30 mg Oral Daily    pantoprazole (Protonix) DR tab 40 mg  40 mg Oral QAM AC    sevelamer carbonate (Renvela) tab 2,400 mg  2,400 mg Oral TID CC    tamsulosin (Flomax) cap 0.4 mg  0.4 mg Oral Daily    umeclidinium bromide (Incruse Ellipta) 62.5 MCG/ACT inhaler 1 puff  1 puff Inhalation Daily    acetaminophen (Tylenol Extra Strength) tab 500 mg  500 mg Oral Q4H PRN    melatonin tab 3 mg  3 mg Oral Nightly PRN    polyethylene glycol (PEG 3350) (Miralax) 17 g oral packet 17 g  17 g Oral Daily PRN    sennosides (Senokot) tab 17.2 mg  17.2 mg Oral Nightly PRN    bisacodyl (Dulcolax) 10 MG rectal suppository 10 mg  10 mg Rectal Daily PRN    ondansetron (Zofran) 4 MG/2ML injection 4 mg  4 mg Intravenous Q6H PRN    prochlorperazine (Compazine) 10 MG/2ML injection 5 mg  5 mg Intravenous Q8H PRN    morphINE PF 4 MG/ML injection 2 mg  2 mg Intravenous Q4H PRN    amphetamine-dextroamphetamine (Adderall) tab 10 mg  10 mg Oral BID@0800,1200    sodium chloride 0.9 % IV bolus 100 mL  100 mL Intravenous Q30 Min PRN    And    albumin human (Albumin) 25% injection 25 g  25 g Intravenous PRN Dialysis

## 2025-05-16 NOTE — PLAN OF CARE
Received pt A&Ox4.  on RA. NSR on tele. Tolerating diet. Voids via BRP, oliguric. HD overnight. Up SBA. Pain management per MAR. Plan of care continues, no further needs at this time.     Problem: PAIN - ADULT  Goal: Verbalizes/displays adequate comfort level or patient's stated pain goal  Description: INTERVENTIONS:  - Encourage pt to monitor pain and request assistance  - Assess pain using appropriate pain scale  - Administer analgesics based on type and severity of pain and evaluate response  - Implement non-pharmacological measures as appropriate and evaluate response  - Consider cultural and social influences on pain and pain management  - Manage/alleviate anxiety  - Utilize distraction and/or relaxation techniques  - Monitor for opioid side effects  - Notify MD/LIP if interventions unsuccessful or patient reports new pain  - Anticipate increased pain with activity and pre-medicate as appropriate  Outcome: Progressing     Problem: RISK FOR INFECTION - ADULT  Goal: Absence of fever/infection during anticipated neutropenic period  Description: INTERVENTIONS  - Monitor WBC  - Administer growth factors as ordered  - Implement neutropenic guidelines  Outcome: Progressing     Problem: SAFETY ADULT - FALL  Goal: Free from fall injury  Description: INTERVENTIONS:  - Assess pt frequently for physical needs  - Identify cognitive and physical deficits and behaviors that affect risk of falls.  - [place] fall precautions as indicated by assessment.  - Educate pt/family on patient safety including physical limitations  - Instruct pt to call for assistance with activity based on assessment  - Modify environment to reduce risk of injury  - Provide assistive devices as appropriate  - Consider OT/PT consult to assist with strengthening/mobility  - Encourage toileting schedule  Outcome: Progressing     Problem: DISCHARGE PLANNING  Goal: Discharge to home or other facility with appropriate resources  Description:  INTERVENTIONS:  - Identify barriers to discharge w/pt and caregiver  - Include patient/family/discharge partner in discharge planning  - Arrange for needed discharge resources and transportation as appropriate  - Identify discharge learning needs (meds, wound care, etc)  - Arrange for interpreters to assist at discharge as needed  - Consider post-discharge preferences of patient/family/discharge partner  - Complete POLST form as appropriate  - Assess patient's ability to be responsible for managing their own health  - Refer to Case Management Department for coordinating discharge planning if the patient needs post-hospital services based on physician/LIP order or complex needs related to functional status, cognitive ability or social support system  Outcome: Progressing

## 2025-05-16 NOTE — PROGRESS NOTES
[facility]   part of [facility] Hospitalist Progress Note     Godwin Fonseca Patient Status:  Observation    1980 MRN [id number]   Location [facility] 5NW-A Attending Yuriy Forrest MD   Hosp Day # 0 PCP Adrian Small MD     Subjective:     Patient seen and examined.   Tired and states he doesn't think the bleeding is \"all related to hemorrhoids\"  Admits to know bleeding \"so far\" while in the hospital  Plan is for HD today  Pt agreeable to dc with plan to fu with CRS Dr Mccrary on Monday, appt already made  Hgb stable so far      Objective:    Review of Systems:   10 point ROS completed and was negative, except for pertinent positive and negatives stated in subjective.    Vital signs:  Temp:  [97.7 °F (36.5 °C)-98.7 °F (37.1 °C)] 97.9 °F (36.6 °C)  Pulse:  [] 91  Resp:  [20-23] 20  BP: (132-176)/() 144/98  SpO2:  [88 %-98 %] 95 %    Physical Exam:    General: No acute distress.   HEENT:  EOMI, PERRLA, OP clear  Respiratory: Clear to auscultation bilaterally. No wheezes. No rhonchi.  Cardiovascular: S1, S2. Regular rate and rhythm. No murmurs.  Abdomen: Soft, nontender, nondistended.  Positive bowel sounds. No rebound or guarding.  Extremities: No edema.  Neuro:  Grossly non focal, no motor deficits.        Diagnostic Data:    Labs:  Recent Labs   Lab 05/15/25  0347 05/15/25  0954 05/15/25  1616 25  0001   WBC 7.4  --   --   --    HGB 7.9* 8.0* 8.3* 7.9*   MCV 95.9  --   --   --    .0  --   --   --        Recent Labs   Lab 05/15/25  0349   *   *   CREATSERUM 15.50*   CA 8.7   ALB 4.2      K 4.1      CO2 16.0*   ALKPHO 113   AST 42*   ALT 38   BILT 0.4   TP 7.3       Estimated Creatinine Clearance: 6.9 mL/min (A) (based on SCr of 15.5 mg/dL (H)).    No results for input(s): \"PTP\", \"INR\" in the last 168 hours.         COVID-19 Lab Results    COVID-19  Lab Results   Component Value Date    COVID19 Not Detected  04/19/2025    COVID19 Not Detected 03/29/2025    COVID19 Not Detected 03/18/2025       Pro-Calcitonin  No results for input(s): \"PCT\" in the last 168 hours.    Cardiac  No results for input(s): \"TROP\", \"PBNP\" in the last 168 hours.    Creatinine Kinase  No results for input(s): \"CK\" in the last 168 hours.    Inflammatory Markers  No results for input(s): \"CRP\", \"HARJEET\", \"LDH\", \"DDIMER\" in the last 168 hours.    Imaging: Imaging data reviewed in Epic.    Medications: Scheduled Medications[1]    Assessment & Plan:    45 year old male with PMH including but not limited to HTN, CHF, ESRD on HD, DM, bipolar do, pavan, here for abd pain and bloody BMs.         Abd pain and bloody BMs  - GI following, apprec, d/w Joe Salinas MD    - EGD last week, colonoscopy 3/2025 and capsule endoscopy 10/2024 all without any other source of bleeding other than his hemorrhoids   - f/u with colorectal Dr. Mccrary for hemorrhoidectomy as outpt   - anusol if needed  - last admit RUQ ultrasound - GB with sludge, borderline CBD; Mild lipase elevation but doubt pancreatitis - has had elevation previously   - MRCP without abd pathology; f/u findings in liver as o/p   - EGD 5/8 c mild esophagitis and a hiatal hernia. PPI and bentyl prn         Anemia  EMILY  - 2/2 above as well as ESRD  - hgb 7.9, was 9.0 5/9/25 >> 7.9 this am >> 7.5 this afternoon.  -rpeat H&H this afternoon >> if stable > pt can dc and fu with CRS as outpt  - follow  - Continue ferrous sulfate (will darken stool as well)  - BUN elevated may indicate GIB but missed HD >> sp HD 5/15        ESRD on HD   secondary hyperparathyroidism  - HD MWF    -US of LUE fistula -->thrombus measures 4.2 cm in the venous outflow tract versus 2.6 cm on prior exam.   - VASCULAR notified previously, likely will need new fistula as o/p   - L arm precautions   - renal on, d/w Dr. Nava        Chronic HFpEF  HTN, uncontrolled  Hx of MV repair   -on multiple bp meds at home, cont   - Continue Coreg,  clonidine, losartan, nifedipine, hydralazine statin        DM 2  -A1c improved now       MDD/HILARIO/BPD  - Continue Abilify, Prozac, Lamictal, Wellbutrin     Scds  Heparin on hold        DISPO:  Dc planning in process  Plan for DC home today  Repeat H&H this afternoon  If stable can dc with plans to fu with CRS      Ria Domínguez Hospitalist  Pager [phone]  Answering Service number: [phone]               [1]    ARIPiprazole  15 mg Oral Daily    atorvastatin  20 mg Oral QPM    buPROPion ER  150 mg Oral Daily    calcium acetate  667 mg Oral TID with meals    carvedilol  25 mg Oral BID with meals    cloNIDine  0.1 mg Oral BID    FLUoxetine  40 mg Oral Daily    gabapentin  200 mg Oral TID    hydrALAZINE  25 mg Oral TID    lamoTRIgine  100 mg Oral Daily    NIFEdipine ER  30 mg Oral Daily    pantoprazole  40 mg Oral QAM AC    sevelamer carbonate  2,400 mg Oral TID CC    tamsulosin  0.4 mg Oral Daily    umeclidinium bromide  1 puff Inhalation Daily    amphetamine-dextroamphetamine  10 mg Oral BID@0800,1200

## (undated) DEVICE — SUTURE VICRYL 2-0 FSL

## (undated) DEVICE — KIT VLV 5 PC AIR H2O SUCT BX ENDOGATOR CONN

## (undated) DEVICE — GIJAW SINGLE-USE BIOPSY FORCEPS WITH NEEDLE: Brand: GIJAW

## (undated) DEVICE — BLADELESS OBTURATOR: Brand: WECK VISTA

## (undated) DEVICE — STERILE SYNTHETIC POLYISOPRENE POWDER-FREE SURGICAL GLOVES WITH HYDROGEL COATING, SMOOTH FINISH, STRAIGHT FINGER: Brand: PROTEXIS

## (undated) DEVICE — SUTURE VLOC 90 2-0 9" 2145

## (undated) DEVICE — LIGHT HANDLE

## (undated) DEVICE — UNDYED BRAIDED (POLYGLACTIN 910), SYNTHETIC ABSORBABLE SUTURE: Brand: COATED VICRYL

## (undated) DEVICE — 1200CC GUARDIAN II: Brand: GUARDIAN

## (undated) DEVICE — KIT CUSTOM ENDOPROCEDURE STERIS

## (undated) DEVICE — STERILE POLYISOPRENE POWDER-FREE SURGICAL GLOVES: Brand: PROTEXIS

## (undated) DEVICE — LASSO POLYPECTOMY SNARE: Brand: LASSO

## (undated) DEVICE — TRANSPOSAL ULTRAFLEX DUO/QUAD ULTRA CART MANIFOLD

## (undated) DEVICE — SCD SLEEVE KNEE HI BLEND

## (undated) DEVICE — SUT MONOCRYL 4-0 PS-2 Y496G

## (undated) DEVICE — 10FT COMBINED O2 DELIVERY/CO2 MONITORING. FILTER WITH MICROSTREAM TYPE LUER: Brand: DUAL ADULT NASAL CANNULA

## (undated) DEVICE — STANDARD HYPODERMIC NEEDLE,POLYPROPYLENE HUB: Brand: MONOJECT

## (undated) DEVICE — SINUS CDS: Brand: MEDLINE INDUSTRIES, INC.

## (undated) DEVICE — INSUFFLATION NEEDLE TO ESTABLISH PNEUMOPERITONEUM.: Brand: INSUFFLATION NEEDLE

## (undated) DEVICE — V2 SPECIMEN COLLECTION MANIFOLD KIT: Brand: NEPTUNE

## (undated) DEVICE — SOL  .9 1000ML BTL

## (undated) DEVICE — ENDOSCOPY PACK - LOWER: Brand: MEDLINE INDUSTRIES, INC.

## (undated) DEVICE — CANNULA SEAL

## (undated) DEVICE — COVER WAND RF DETECT

## (undated) DEVICE — 40580 - THE PINK PAD - ADVANCED TRENDELENBURG POSITIONING KIT: Brand: 40580 - THE PINK PAD - ADVANCED TRENDELENBURG POSITIONING KIT

## (undated) DEVICE — 3M™ MICROFOAM™ TAPE 1528-4: Brand: 3M™ MICROFOAM™

## (undated) DEVICE — 3M™ RED DOT™ MONITORING ELECTRODE WITH FOAM TAPE AND STICKY GEL, 50/BAG, 20/CASE, 72/PLT 2570: Brand: RED DOT™

## (undated) DEVICE — MONOPOLAR CURVED SCISSORS: Brand: ENDOWRIST

## (undated) DEVICE — BIOGUARD CLEANING ADAPTER

## (undated) DEVICE — FENESTRATED BIPOLAR FORCEPS: Brand: ENDOWRIST

## (undated) DEVICE — VISUALIZATION SYSTEM: Brand: CLEARIFY

## (undated) DEVICE — Device

## (undated) DEVICE — BITEBLOCK ENDOSCP 60FR MAXI STRP

## (undated) DEVICE — ARM DRAPE

## (undated) DEVICE — COLUMN DRAPE

## (undated) DEVICE — SUTURE VLOC 180 0 12" 0316

## (undated) DEVICE — MEGA SUTURECUT ND: Brand: ENDOWRIST

## (undated) DEVICE — KENDALL SCD EXPRESS SLEEVES, THIGH LENGTH, MEDIUM: Brand: KENDALL SCD

## (undated) DEVICE — STERIS KITS

## (undated) DEVICE — TIP COVER ACCESSORY

## (undated) DEVICE — CAUTERY PENCIL

## (undated) DEVICE — KIT ENDO ORCAPOD 160/180/190

## (undated) DEVICE — TRAP POLYP W/ 2 SPEC TY CLR MAGNIFYING WIND

## (undated) DEVICE — 3.0MM PRECISION NEURO (MATCH HEAD)

## (undated) DEVICE — SUTURE VICRYL 1 OS-6

## (undated) DEVICE — LAMINECTOMY CDS: Brand: MEDLINE INDUSTRIES, INC.

## (undated) DEVICE — ANGLED MICRO-NEEDLE ELECTRODE: Brand: VALLEYLAB

## (undated) DEVICE — FLOSEAL HEMOSTATIC MATRIX, 5ML: Brand: FLOSEAL HEMOSTATIC MATRIX

## (undated) DEVICE — ROBOTIC GENERAL: Brand: MEDLINE INDUSTRIES, INC.

## (undated) DEVICE — PROGRASP FORCEPS: Brand: ENDOWRIST

## (undated) DEVICE — CLOSURE EXOFIN 1.0ML

## (undated) DEVICE — WRAP COOLING BACK W/NO PILLOW

## (undated) NOTE — LETTER
[address]  [address]  Consent for Procedure/Sedation  Date: 9/20/2024         Time: 0800    I hereby authorize Dr. Bernal, my physician and his/her assistants (if applicable), which may include medical students, residents, and/or fellows, to perform the following surgical operation/ procedure and administer such anesthesia as may be determined necessary by my physician: Permanent dialysis catheter insertion on Godwin Fonseca  2.   I recognize that during the surgical operation/procedure, unforeseen conditions may necessitate additional or different procedures than those listed above.  I, therefore, further authorize and request that the above-named surgeon, assistants, or designees perform such procedures as are, in their judgment, necessary and desirable.    3.   My surgeon/physician has discussed prior to my surgery the potential benefits, risks and side effects of this procedure; the likelihood of achieving goals; and potential problems that might occur during recuperation.  They also discussed reasonable alternatives to the procedure, including risks, benefits, and side effects related to the alternatives and risks related to not receiving this procedure.  I have had all my questions answered and I acknowledge that no guarantee has been made as to the result that may be obtained.    4.   Should the need arise during my operation/procedure, which includes change of level of care prior to discharge, I also consent to the administration of blood and/or blood products.  Further, I understand that despite careful testing and screening of blood or blood products by collecting agencies, I may still be subject to ill effects as a result of receiving a blood transfusion and/or blood products.  The following are some, but not all, of the potential risks that can occur: fever and allergic reactions, hemolytic reactions, transmission of diseases such as Hepatitis, AIDS and  Cytomegalovirus (CMV) and fluid overload.  In the event that I wish to have an autologous transfusion of my own blood, or a directed donor transfusion, I will discuss this with my physician.   Check only if Refusing Blood or Blood Products  I understand refusal of blood or blood products as deemed necessary by my physician may have serious consequences to my condition to include possible death. I hereby assume responsibility for my refusal and release the hospital, its personnel, and my physicians from any responsibility for the consequences of my refusal.         o  Refuse         5.   I authorize the use of any specimen, organs, tissues, body parts or foreign objects that may be removed from my body during the operation/procedure for diagnosis, research or teaching purposes and their subsequent disposal by hospital authorities.  I also authorize the release of specimen test results and/or written reports to my treating physician on the hospital medical staff or other referring or consulting physicians involved in my care, at the discretion of the Pathologist or my treating physician.    6.   I consent to the photographing or videotaping of the operations or procedures to be performed, including appropriate portions of my body for medical, scientific, or educational purposes, provided my identity is not revealed by the pictures or by descriptive texts accompanying them.  If the procedure has been photographed/videotaped, the surgeon will obtain the original picture, image, videotape or CD.  The hospital will not be responsible for storage, release or maintenance of the picture, image, tape or CD.    7.   I consent to the presence of a  or observers in the operating room as deemed necessary by my physician or their designees.    8.   I recognize that in the event my procedure results in extended X-Ray/fluoroscopy time, I may develop a skin reaction.    9. If I have a Do Not Attempt Resuscitation  (DNAR) order in place, that status will be suspended while in the operating room, procedural suite, and during the recovery period unless otherwise explicitly stated by me (or a person authorized to consent on my behalf). The surgeon or my attending physician will determine when the applicable recovery period ends for purposes of reinstating the DNAR order.  10. Patients having a sterilization procedure: I understand that if the procedure is successful the results will be permanent and it will therefore be impossible for me to inseminate, conceive, or bear children.  I also understand that the procedure is intended to result in sterility, although the result has not been guaranteed.   11. I acknowledge that my physician has explained sedation/analgesia administration to me including the risk and benefits I consent to the administration of sedation/analgesia as may be necessary or desirable in the judgment of my physician.    I CERTIFY THAT I HAVE READ AND FULLY UNDERSTAND THE ABOVE CONSENT TO OPERATION and/or OTHER PROCEDURE.        ____________________________________       _________________________________      ______________________________  Signature of Patient         Signature of Responsible Person        Printed Name of Responsible Person        ____________________________________      _________________________________      ______________________________       Signature of Witness          Relationship to Patient                       Date                                       Time  Patient Name: Godwin Fonseca  : 1980    Reviewed: 2024   Printed: 2024  Medical Record #: [id number] Page 1 of 1

## (undated) NOTE — LETTER
OSR/SARAH Notification    To: Dr Hoyos Postal Date:  10/22/2021  Fax #:     Patient Name: Katie Reyes / Sex: 4/12/1980-A: 35 y  male  Phone:    CSN: [id number]      Medical Records: [id number]    Surgeon(s):  Surgeon(s):  Dre Martell MD   Procedu

## (undated) NOTE — LETTER
June 6, 2019          [place] [id number]          Dear William Ponce:    Abdominal Ultrasound:    Kidneys are normal.  Hepatic steatosis. (fatty liver)  Please continue present management.       Nursing staff for Dr Shaila Decker

## (undated) NOTE — LETTER
[address]  [address]  Consent for Procedure/Sedation  Date: 4/21/25         Time: 1200    I hereby authorize Dr Bernal, my physician and his/her assistants (if applicable), which may include medical students, residents, and/or fellows, to perform the following surgical operation/ procedure and administer such anesthesia as may be determined necessary by my physician: Fistulagram with Possible Angioplasty on Godwin Fonseca  2.   I recognize that during the surgical operation/procedure, unforeseen conditions may necessitate additional or different procedures than those listed above.  I, therefore, further authorize and request that the above-named surgeon, assistants, or designees perform such procedures as are, in their judgment, necessary and desirable.    3.   My surgeon/physician has discussed prior to my surgery the potential benefits, risks and side effects of this procedure; the likelihood of achieving goals; and potential problems that might occur during recuperation.  They also discussed reasonable alternatives to the procedure, including risks, benefits, and side effects related to the alternatives and risks related to not receiving this procedure.  I have had all my questions answered and I acknowledge that no guarantee has been made as to the result that may be obtained.    4.   Should the need arise during my operation/procedure, which includes change of level of care prior to discharge, I also consent to the administration of blood and/or blood products.  Further, I understand that despite careful testing and screening of blood or blood products by collecting agencies, I may still be subject to ill effects as a result of receiving a blood transfusion and/or blood products.  The following are some, but not all, of the potential risks that can occur: fever and allergic reactions, hemolytic reactions, transmission of diseases such as Hepatitis, AIDS and  Cytomegalovirus (CMV) and fluid overload.  In the event that I wish to have an autologous transfusion of my own blood, or a directed donor transfusion, I will discuss this with my physician.   Check only if Refusing Blood or Blood Products  I understand refusal of blood or blood products as deemed necessary by my physician may have serious consequences to my condition to include possible death. I hereby assume responsibility for my refusal and release the hospital, its personnel, and my physicians from any responsibility for the consequences of my refusal.         o  Refuse         5.   I authorize the use of any specimen, organs, tissues, body parts or foreign objects that may be removed from my body during the operation/procedure for diagnosis, research or teaching purposes and their subsequent disposal by hospital authorities.  I also authorize the release of specimen test results and/or written reports to my treating physician on the hospital medical staff or other referring or consulting physicians involved in my care, at the discretion of the Pathologist or my treating physician.    6.   I consent to the photographing or videotaping of the operations or procedures to be performed, including appropriate portions of my body for medical, scientific, or educational purposes, provided my identity is not revealed by the pictures or by descriptive texts accompanying them.  If the procedure has been photographed/videotaped, the surgeon will obtain the original picture, image, videotape or CD.  The hospital will not be responsible for storage, release or maintenance of the picture, image, tape or CD.    7.   I consent to the presence of a  or observers in the operating room as deemed necessary by my physician or their designees.    8.   I recognize that in the event my procedure results in extended X-Ray/fluoroscopy time, I may develop a skin reaction.    9. If I have a Do Not Attempt Resuscitation  (DNAR) order in place, that status will be suspended while in the operating room, procedural suite, and during the recovery period unless otherwise explicitly stated by me (or a person authorized to consent on my behalf). The surgeon or my attending physician will determine when the applicable recovery period ends for purposes of reinstating the DNAR order.  10. Patients having a sterilization procedure: I understand that if the procedure is successful the results will be permanent and it will therefore be impossible for me to inseminate, conceive, or bear children.  I also understand that the procedure is intended to result in sterility, although the result has not been guaranteed.   11. I acknowledge that my physician has explained sedation/analgesia administration to me including the risk and benefits I consent to the administration of sedation/analgesia as may be necessary or desirable in the judgment of my physician.    I CERTIFY THAT I HAVE READ AND FULLY UNDERSTAND THE ABOVE CONSENT TO OPERATION and/or OTHER PROCEDURE.        ____________________________________       _________________________________      ______________________________  Signature of Patient         Signature of Responsible Person        Printed Name of Responsible Person        ____________________________________      _________________________________      ______________________________       Signature of Witness          Relationship to Patient                       Date                                       Time  Patient Name: Godwin Fonseca  : 1980    Reviewed: 2024   Printed: 2025  Medical Record #: [id number] Page 1 of 1

## (undated) NOTE — LETTER
[address]  [address]  Authorization for Surgical Operation and Procedure     Date:___________                                                                                                         Time:__________  I hereby authorize Surgeon(s):  Ousmane Suarez MD, my physician and his/her assistants (if applicable), which may include medical students, residents, and/or fellows, to perform the following surgical operation/ procedure and administer such anesthesia as may be determined necessary by my physician:  Operation/Procedure name (s) Procedure(s):  ESOPHAGOGASTRODUODENOSCOPY (EGD) on Godwin Raymundo   2.   I recognize that during the surgical operation/procedure, unforeseen conditions may necessitate additional or different procedures than those listed above.  I, therefore, further authorize and request that the above-named surgeon, assistants, or designees perform such procedures as are, in their judgment, necessary and desirable.    3.   My surgeon/physician has discussed prior to my surgery the potential benefits, risks and side effects of this procedure; the likelihood of achieving goals; and potential problems that might occur during recuperation.  They also discussed reasonable alternatives to the procedure, including risks, benefits, and side effects related to the alternatives and risks related to not receiving this procedure.  I have had all my questions answered and I acknowledge that no guarantee has been made as to the result that may be obtained.    4.   Should the need arise during my operation/procedure, which includes change of level of care prior to discharge, I also consent to the administration of blood and/or blood products.  Further, I understand that despite careful testing and screening of blood or blood products by collecting agencies, I may still be subject to ill effects as a result of receiving a blood transfusion and/or blood products.  The  following are some, but not all, of the potential risks that can occur: fever and allergic reactions, hemolytic reactions, transmission of diseases such as Hepatitis, AIDS and Cytomegalovirus (CMV) and fluid overload.  In the event that I wish to have an autologous transfusion of my own blood, or a directed donor transfusion, I will discuss this with my physician.  Check only if Refusing Blood or Blood Products  I understand refusal of blood or blood products as deemed necessary by my physician may have serious consequences to my condition to include possible death. I hereby assume responsibility for my refusal and release the hospital, its personnel, and my physicians from any responsibility for the consequences of my refusal.          o  Refuse      5.   I authorize the use of any specimen, organs, tissues, body parts or foreign objects that may be removed from my body during the operation/procedure for diagnosis, research or teaching purposes and their subsequent disposal by hospital authorities.  I also authorize the release of specimen test results and/or written reports to my treating physician on the hospital medical staff or other referring or consulting physicians involved in my care, at the discretion of the Pathologist or my treating physician.    6.   I consent to the photographing or videotaping of the operations or procedures to be performed, including appropriate portions of my body for medical, scientific, or educational purposes, provided my identity is not revealed by the pictures or by descriptive texts accompanying them.  If the procedure has been photographed/videotaped, the surgeon will obtain the original picture, image, videotape or CD.  The hospital will not be responsible for storage, release or maintenance of the picture, image, tape or CD.    7.   I consent to the presence of a  or observers in the operating room as deemed necessary by my physician or their designees.     8.   I recognize that in the event my procedure results in extended X-Ray/fluoroscopy time, I may develop a skin reaction.    9. If I have a Do Not Attempt Resuscitation (DNAR) order in place, that status will be suspended while in the operating room, procedural suite, and during the recovery period unless otherwise explicitly stated by me (or a person authorized to consent on my behalf). The surgeon or my attending physician will determine when the applicable recovery period ends for purposes of reinstating the DNAR order.  10. Patients having a sterilization procedure: I understand that if the procedure is successful the results will be permanent and it will therefore be impossible for me to inseminate, conceive, or bear children.  I also understand that the procedure is intended to result in sterility, although the result has not been guaranteed.   11. I acknowledge that my physician has explained sedation/analgesia administration to me including the risk and benefits I consent to the administration of sedation/analgesia as may be necessary or desirable in the judgment of my physician.    I CERTIFY THAT I HAVE READ AND FULLY UNDERSTAND THE ABOVE CONSENT TO OPERATION and/or OTHER PROCEDURE.    _________________________________________  __________________________________  Signature of Patient     Signature of Responsible Person         ___________________________________         Printed Name of Responsible Person           _________________________________                 Relationship to Patient  _________________________________________  ______________________________  Signature of Witness          Date  Time      Patient Name: Godwin Fonseca     : 1980                 Printed: May 7, 2025     Medical Record #: [id number]                     Page [address] of [address] [place] [address], [place]  [address]    Consent for Anesthesia    I, Godwin Fonseca agree to be  cared for by an anesthesiologist, who is specially trained to monitor me and give me medicine to put me to sleep or keep me comfortable during my procedure    I understand that my anesthesiologist is not an employee or agent of [facility] or [employer]. He or she works for [employer].    As the patient asking for anesthesia services, I agree to:  Allow the anesthesiologist (anesthesia doctor) to give me medicine and do additional procedures as necessary. Some examples are: Starting or using an “IV” to give me medicine, fluids or blood during my procedure, and having a breathing tube placed to help me breathe when I’m asleep (intubation). In the event that my heart stops working properly, I understand that my anesthesiologist will make every effort to sustain my life, unless otherwise directed by [facility] Do Not Resuscitate documents.  Tell my anesthesia doctor before my procedure:  If I am pregnant.  The last time that I ate or drank.  All of the medicines I take (including prescriptions, herbal supplements, and pills I can buy without a prescription (including street drugs/illegal medications). Failure to inform my anesthesiologist about these medicines may increase my risk of anesthetic complications.  If I am allergic to anything or have had a reaction to anesthesia before.  I understand how the anesthesia medicine will help me (benefits).  I understand that with any type of anesthesia medicine there are risks:  The most common risks are: nausea, vomiting, sore throat, muscle soreness, damage to my eyes, mouth, or teeth (from breathing tube placement).  Rare risks include: remembering what happened during my procedure, allergic reactions to medications, injury to my airway, heart, lungs, vision, nerves, or muscles and in extremely rare instances death.  My doctor has explained to me other choices available to me for my care (alternatives).  Pregnant Patients  (“epidural”):  I understand that the risks of having an epidural (medicine given into my back to help control pain during labor), include itching, low blood pressure, difficulty urinating, headache or slowing of the baby’s heart. Very rare risks include infection, bleeding, seizure, irregular heart rhythms and nerve injury.  Regional Anesthesia (“spinal”, “epidural”, & “nerve blocks”):  I understand that rare but potential complications include headache, bleeding, infection, seizure, irregular heart rhythms, and nerve injury.    I can change my mind about having anesthesia services at any time before I get the medicine.    _____________________________________________________________________________  Patient (or Representative) Signature/Relationship to Patient  Date   Time    _____________________________________________________________________________   Name (if used)    Language/Organization   Time    _____________________________________________________________________________  Anesthesiologist Signature     Date   Time  I have discussed the procedure and information above with the patient (or patient’s representative) and answered their questions. The patient or their representative has agreed to have anesthesia services.    _____________________________________________________________________________  Witness        Date   Time  I have verified that the signature is that of the patient or patient’s representative, and that it was signed before the procedure  Patient Name: Godwin Fonseca     : 1980                 Printed: May 7, 2025     Medical Record #: [id number]                     Page 2 of 2

## (undated) NOTE — LETTER
OUTSIDE TESTING RESULT REQUEST     IMPORTANT: FOR YOUR IMMEDIATE ATTENTION  Please FAX all test results listed below to: [phone]     Testing already done on or about: Oct 15- last week per pt     * * * * If testing is NOT complete, arrange with patien

## (undated) NOTE — LETTER
[facility]  [address] [address]  [phone]    Blood Transfusion Consent    In the course of your treatment, it may become necessary to administer a transfusion of blood or blood components. This form provides basic information concerning this procedure and, if signed by you, authorizes its administration. By signing this form, you agree that all of your questions about the administration of blood or blood products have been answered by the ordering medical professional or designee.    Description of Procedure  Blood is introduced into one of your veins, commonly in the arm, using a sterilized disposable needle. The amount of blood transfused, and whether the transfusion will be of blood or blood components is a judgement the physician will make based on your particular needs.    Risks  The transfusion is a common procedure of low risk.  MINOR AND TEMPORARY REACTIONS ARE NOT UNCOMMON, including a slight bruise, swelling or local reaction in the area where the needle pierces your skin, or a nonserious reaction to the transfused material itself, including headache, fever or mild skin reaction, such as rash.  Serious reactions are possible, though very unlikely, and include severe allergic reaction (shock) and destruction (hemolysis) of transfused blood cells.  Infectious diseases which are known to be transmitted by blood transfusion include certain types of viral Hepatitis(liver infection from a virus), Human Immunodeficiency Virus (HIV-1,2) infection, a viral infection known to cause Acquired Immunodeficiency Syndrome (AIDS), as well as certain other bacterial, viral, and parasitic diseases. While a minimal risk of acquiring an infectious disease from transfused blood exists, in accordance with the Federal and State law, all due care has been taken in donor selection and testing to avoid transmission of disease.    Alternatives  If loss of blood poses serious threats during your  treatment, THERE IS NO EFFECTIVE ALTERNATIVE TO BLOOD TRANSFUSION. However, if you have any further questions on this matter, your provider will fully explain the alternatives to you if it has not already been done.    I, ______________________________, have read/had read to me the above. I understand the matters bearing on the decision whether or not to authorize a transfusion of blood or blood components. I have no questions which have not been answered to my full satisfaction. I hereby consent to such transfusion as my physician may deem necessary or advisable in the course of my treatment.    ______________________________________________                    ___________________________  (Signature of Patient or Responsible party in case of minor,                 (Printed Name of Patient or incompetent, or unconscious patient)              Responsible Party)    ___________________________               _____________________  (Relationship to Patient if not self)                                    (Date and Time)    __________________________                                                           ______________________              (Signature of Witness)               (Printed Name of Witness)     Language line ()    Telephone/Verbal/Video Consent    __________________________                     ____________________  (Signature of 2nd Witness           (Printed Name of 2nd  Telephone/Verbal/Video Consent)           Witness)    Patient Name: Godwin Fonseca     : 1980                 Printed: 2025     Medical Record #: [id number]      Rev: 2023

## (undated) NOTE — MR AVS SNAPSHOT
[address]  [address] [facility] [address] [phone]               Thank you for choosing us for your health care visit with MARLEE Jones.   We are glad to serve you and happy to provide you with this summary of Lithium Carbonate 300 MG Caps   Take 1 capsule by mouth 2 (two) times daily with meals. SEROQUEL 300 MG Tabs   Generic drug:  QUEtiapine Fumarate   Take 50 mg by mouth 2 (two) times daily. * Notice:   This list has 2 medication(s) that active are less likely to develop some chronic diseases than adults who are inactive.      HOW TO GET STARTED: HOW TO STAY MOTIVATED:   Start activities slowly and build up over time Do what you like   Get your heart pumping – brisk walking, biking, swimmin

## (undated) NOTE — LETTER
[facility]  [address] [address]  [phone]    Blood Transfusion Consent    In the course of your treatment, it may become necessary to administer a transfusion of blood or blood components. This form provides basic information concerning this procedure and, if signed by you, authorizes its administration. By signing this form, you agree that all of your questions about the administration of blood or blood products have been answered by the ordering medical professional or designee.    Description of Procedure  Blood is introduced into one of your veins, commonly in the arm, using a sterilized disposable needle. The amount of blood transfused, and whether the transfusion will be of blood or blood components is a judgement the physician will make based on your particular needs.    Risks  The transfusion is a common procedure of low risk.  MINOR AND TEMPORARY REACTIONS ARE NOT UNCOMMON, including a slight bruise, swelling or local reaction in the area where the needle pierces your skin, or a nonserious reaction to the transfused material itself, including headache, fever or mild skin reaction, such as rash.  Serious reactions are possible, though very unlikely, and include severe allergic reaction (shock) and destruction (hemolysis) of transfused blood cells.  Infectious diseases which are known to be transmitted by blood transfusion include certain types of viral Hepatitis(liver infection from a virus), Human Immunodeficiency Virus (HIV-1,2) infection, a viral infection known to cause Acquired Immunodeficiency Syndrome (AIDS), as well as certain other bacterial, viral, and parasitic diseases. While a minimal risk of acquiring an infectious disease from transfused blood exists, in accordance with the Federal and State law, all due care has been taken in donor selection and testing to avoid transmission of disease.    Alternatives  If loss of blood poses serious threats during your  treatment, THERE IS NO EFFECTIVE ALTERNATIVE TO BLOOD TRANSFUSION. However, if you have any further questions on this matter, your provider will fully explain the alternatives to you if it has not already been done.    I, ______________________________, have read/had read to me the above. I understand the matters bearing on the decision whether or not to authorize a transfusion of blood or blood components. I have no questions which have not been answered to my full satisfaction. I hereby consent to such transfusion as my physician may deem necessary or advisable in the course of my treatment.    ______________________________________________                    ___________________________  (Signature of Patient or Responsible party in case of minor,                 (Printed Name of Patient or incompetent, or unconscious patient)              Responsible Party)    ___________________________               _____________________  (Relationship to Patient if not self)                                    (Date and Time)    __________________________                                                           ______________________              (Signature of Witness)               (Printed Name of Witness)     Language line ()    Telephone/Verbal/Video Consent    __________________________                     ____________________  (Signature of 2nd Witness           (Printed Name of 2nd  Telephone/Verbal/Video Consent)           Witness)    Patient Name: Godwin Fonseca     : 1980                 Printed: 2025     Medical Record #: [id number]      Rev: 2023

## (undated) NOTE — LETTER
[address]  [address]  Consent for Procedure/Sedation  Date: 02/03/2025         Time: 1630    I hereby authorize Dr. Sharp, my physician and his/her assistants (if applicable), which may include medical students, residents, and/or fellows, to perform the following surgical operation/ procedure and administer such anesthesia as may be determined necessary by my physician: Permanent dialysis catheter removal on Godwin Fonseca  2.   I recognize that during the surgical operation/procedure, unforeseen conditions may necessitate additional or different procedures than those listed above.  I, therefore, further authorize and request that the above-named surgeon, assistants, or designees perform such procedures as are, in their judgment, necessary and desirable.    3.   My surgeon/physician has discussed prior to my surgery the potential benefits, risks and side effects of this procedure; the likelihood of achieving goals; and potential problems that might occur during recuperation.  They also discussed reasonable alternatives to the procedure, including risks, benefits, and side effects related to the alternatives and risks related to not receiving this procedure.  I have had all my questions answered and I acknowledge that no guarantee has been made as to the result that may be obtained.    4.   Should the need arise during my operation/procedure, which includes change of level of care prior to discharge, I also consent to the administration of blood and/or blood products.  Further, I understand that despite careful testing and screening of blood or blood products by collecting agencies, I may still be subject to ill effects as a result of receiving a blood transfusion and/or blood products.  The following are some, but not all, of the potential risks that can occur: fever and allergic reactions, hemolytic reactions, transmission of diseases such as Hepatitis, AIDS and  Cytomegalovirus (CMV) and fluid overload.  In the event that I wish to have an autologous transfusion of my own blood, or a directed donor transfusion, I will discuss this with my physician.   Check only if Refusing Blood or Blood Products  I understand refusal of blood or blood products as deemed necessary by my physician may have serious consequences to my condition to include possible death. I hereby assume responsibility for my refusal and release the hospital, its personnel, and my physicians from any responsibility for the consequences of my refusal.         o  Refuse         5.   I authorize the use of any specimen, organs, tissues, body parts or foreign objects that may be removed from my body during the operation/procedure for diagnosis, research or teaching purposes and their subsequent disposal by hospital authorities.  I also authorize the release of specimen test results and/or written reports to my treating physician on the hospital medical staff or other referring or consulting physicians involved in my care, at the discretion of the Pathologist or my treating physician.    6.   I consent to the photographing or videotaping of the operations or procedures to be performed, including appropriate portions of my body for medical, scientific, or educational purposes, provided my identity is not revealed by the pictures or by descriptive texts accompanying them.  If the procedure has been photographed/videotaped, the surgeon will obtain the original picture, image, videotape or CD.  The hospital will not be responsible for storage, release or maintenance of the picture, image, tape or CD.    7.   I consent to the presence of a  or observers in the operating room as deemed necessary by my physician or their designees.    8.   I recognize that in the event my procedure results in extended X-Ray/fluoroscopy time, I may develop a skin reaction.    9. If I have a Do Not Attempt Resuscitation  (DNAR) order in place, that status will be suspended while in the operating room, procedural suite, and during the recovery period unless otherwise explicitly stated by me (or a person authorized to consent on my behalf). The surgeon or my attending physician will determine when the applicable recovery period ends for purposes of reinstating the DNAR order.  10. Patients having a sterilization procedure: I understand that if the procedure is successful the results will be permanent and it will therefore be impossible for me to inseminate, conceive, or bear children.  I also understand that the procedure is intended to result in sterility, although the result has not been guaranteed.   11. I acknowledge that my physician has explained sedation/analgesia administration to me including the risk and benefits I consent to the administration of sedation/analgesia as may be necessary or desirable in the judgment of my physician.    I CERTIFY THAT I HAVE READ AND FULLY UNDERSTAND THE ABOVE CONSENT TO OPERATION and/or OTHER PROCEDURE.        ____________________________________       _________________________________      ______________________________  Signature of Patient         Signature of Responsible Person        Printed Name of Responsible Person        ____________________________________      _________________________________      ______________________________       Signature of Witness          Relationship to Patient                       Date                                       Time  Patient Name: Godwin Fonseca  : 1980    Reviewed: 2024   Printed: February 3, 2025  Medical Record #: [id number] Page 1 of 1

## (undated) NOTE — LETTER
OUTSIDE TESTING RESULT REQUEST     IMPORTANT: FOR YOUR IMMEDIATE ATTENTION  Please FAX all test results listed below to: [phone]     Testing already done on or about: 10/2021     * * * * If testing is NOT complete, arrange with patient A.S.A.P. * * *

## (undated) NOTE — LETTER
[facility] Testing Department  Phone: [phone]  Right Fax: [phone]  [facility] [address] RN Date: 18    Patient Name: Ki Mendoza  Surgery Date: 2018    CSN: [id number]  Medical Record: [id number]   :

## (undated) NOTE — LETTER
Date & Time: 4/28/2022, 12:33 PM  Patient: Pao Session  Encounter Provider(s):    MD Marychuy Bergeron PA-C         This certifies that Katelyn Bright, a patient at an [facility] facility, am leaving the facility voluntarily and against the advice of my physician. I acknowledge that I have been:    1. informed that my physician believes that I need to receive care here;  2. informed that if I leave, I could become sicker or even die; and  3. provided discharge instructions consistent with my current diagnosis. I hereby release my physician, the facility, and its employees from all responsibility for any ill effects which may result from this action. __________________________________  Patient or authorized caregiver signature    __________________________________  RN signature    If no patient or patient representative signature was obtained, sign below to acknowledge that the form was reviewed with the patient and that the patient refused to sign.     __________________________________  RN signature

## (undated) NOTE — LETTER
Patient Name: Betty Meade CSN: [id number]  -Age / Sex: 1980-A: 35 y  male Medical Records: [id number]    ABNORMAL VALUES  Surgeon(s):  Casimiro Browning MD  Anesthesia Type: General  Procedure Description: Excision of left nasal papilloma       [facility]

## (undated) NOTE — LETTER
[address]  [address]  Consent for Procedure/Sedation  Date: 04/22/2025         Time: 0745    I hereby authorize Dr. Sharp, my physician and his/her assistants (if applicable), which may include medical students, residents, and/or fellows, to perform the following surgical operation/ procedure and administer such anesthesia as may be determined necessary by my physician: Permanent dialysis catheter insertion on Godwin Fonseca  2.   I recognize that during the surgical operation/procedure, unforeseen conditions may necessitate additional or different procedures than those listed above.  I, therefore, further authorize and request that the above-named surgeon, assistants, or designees perform such procedures as are, in their judgment, necessary and desirable.    3.   My surgeon/physician has discussed prior to my surgery the potential benefits, risks and side effects of this procedure; the likelihood of achieving goals; and potential problems that might occur during recuperation.  They also discussed reasonable alternatives to the procedure, including risks, benefits, and side effects related to the alternatives and risks related to not receiving this procedure.  I have had all my questions answered and I acknowledge that no guarantee has been made as to the result that may be obtained.    4.   Should the need arise during my operation/procedure, which includes change of level of care prior to discharge, I also consent to the administration of blood and/or blood products.  Further, I understand that despite careful testing and screening of blood or blood products by collecting agencies, I may still be subject to ill effects as a result of receiving a blood transfusion and/or blood products.  The following are some, but not all, of the potential risks that can occur: fever and allergic reactions, hemolytic reactions, transmission of diseases such as Hepatitis, AIDS and  Cytomegalovirus (CMV) and fluid overload.  In the event that I wish to have an autologous transfusion of my own blood, or a directed donor transfusion, I will discuss this with my physician.   Check only if Refusing Blood or Blood Products  I understand refusal of blood or blood products as deemed necessary by my physician may have serious consequences to my condition to include possible death. I hereby assume responsibility for my refusal and release the hospital, its personnel, and my physicians from any responsibility for the consequences of my refusal.         o  Refuse         5.   I authorize the use of any specimen, organs, tissues, body parts or foreign objects that may be removed from my body during the operation/procedure for diagnosis, research or teaching purposes and their subsequent disposal by hospital authorities.  I also authorize the release of specimen test results and/or written reports to my treating physician on the hospital medical staff or other referring or consulting physicians involved in my care, at the discretion of the Pathologist or my treating physician.    6.   I consent to the photographing or videotaping of the operations or procedures to be performed, including appropriate portions of my body for medical, scientific, or educational purposes, provided my identity is not revealed by the pictures or by descriptive texts accompanying them.  If the procedure has been photographed/videotaped, the surgeon will obtain the original picture, image, videotape or CD.  The hospital will not be responsible for storage, release or maintenance of the picture, image, tape or CD.    7.   I consent to the presence of a  or observers in the operating room as deemed necessary by my physician or their designees.    8.   I recognize that in the event my procedure results in extended X-Ray/fluoroscopy time, I may develop a skin reaction.    9. If I have a Do Not Attempt Resuscitation  (DNAR) order in place, that status will be suspended while in the operating room, procedural suite, and during the recovery period unless otherwise explicitly stated by me (or a person authorized to consent on my behalf). The surgeon or my attending physician will determine when the applicable recovery period ends for purposes of reinstating the DNAR order.  10. Patients having a sterilization procedure: I understand that if the procedure is successful the results will be permanent and it will therefore be impossible for me to inseminate, conceive, or bear children.  I also understand that the procedure is intended to result in sterility, although the result has not been guaranteed.   11. I acknowledge that my physician has explained sedation/analgesia administration to me including the risk and benefits I consent to the administration of sedation/analgesia as may be necessary or desirable in the judgment of my physician.    I CERTIFY THAT I HAVE READ AND FULLY UNDERSTAND THE ABOVE CONSENT TO OPERATION and/or OTHER PROCEDURE.        ____________________________________       _________________________________      ______________________________  Signature of Patient         Signature of Responsible Person        Printed Name of Responsible Person        ____________________________________      _________________________________      ______________________________       Signature of Witness          Relationship to Patient                       Date                                       Time  Patient Name: Godwin Fonseca  : 1980    Reviewed: 2024   Printed: 2025  Medical Record #: [id number] Page 1 of 1

## (undated) NOTE — LETTER
[address]  [address]  Authorization for Surgical Operation and Procedure     Date:___________                                                                                                         Time:__________  I hereby authorize Surgeon(s):  Bakari Noguera MD, my physician and his/her assistants (if applicable), which may include medical students, residents, and/or fellows, to perform the following surgical operation/ procedure and administer such anesthesia as may be determined necessary by my physician:  Operation/Procedure name (s) Procedure(s):  COLONOSCOPY on Godwin Fonseca   2.   I recognize that during the surgical operation/procedure, unforeseen conditions may necessitate additional or different procedures than those listed above.  I, therefore, further authorize and request that the above-named surgeon, assistants, or designees perform such procedures as are, in their judgment, necessary and desirable.    3.   My surgeon/physician has discussed prior to my surgery the potential benefits, risks and side effects of this procedure; the likelihood of achieving goals; and potential problems that might occur during recuperation.  They also discussed reasonable alternatives to the procedure, including risks, benefits, and side effects related to the alternatives and risks related to not receiving this procedure.  I have had all my questions answered and I acknowledge that no guarantee has been made as to the result that may be obtained.    4.   Should the need arise during my operation/procedure, which includes change of level of care prior to discharge, I also consent to the administration of blood and/or blood products.  Further, I understand that despite careful testing and screening of blood or blood products by collecting agencies, I may still be subject to ill effects as a result of receiving a blood transfusion and/or blood products.  The following are some,  but not all, of the potential risks that can occur: fever and allergic reactions, hemolytic reactions, transmission of diseases such as Hepatitis, AIDS and Cytomegalovirus (CMV) and fluid overload.  In the event that I wish to have an autologous transfusion of my own blood, or a directed donor transfusion, I will discuss this with my physician.  Check only if Refusing Blood or Blood Products  I understand refusal of blood or blood products as deemed necessary by my physician may have serious consequences to my condition to include possible death. I hereby assume responsibility for my refusal and release the hospital, its personnel, and my physicians from any responsibility for the consequences of my refusal.          o  Refuse      5.   I authorize the use of any specimen, organs, tissues, body parts or foreign objects that may be removed from my body during the operation/procedure for diagnosis, research or teaching purposes and their subsequent disposal by hospital authorities.  I also authorize the release of specimen test results and/or written reports to my treating physician on the hospital medical staff or other referring or consulting physicians involved in my care, at the discretion of the Pathologist or my treating physician.    6.   I consent to the photographing or videotaping of the operations or procedures to be performed, including appropriate portions of my body for medical, scientific, or educational purposes, provided my identity is not revealed by the pictures or by descriptive texts accompanying them.  If the procedure has been photographed/videotaped, the surgeon will obtain the original picture, image, videotape or CD.  The hospital will not be responsible for storage, release or maintenance of the picture, image, tape or CD.    7.   I consent to the presence of a  or observers in the operating room as deemed necessary by my physician or their designees.    8.   I recognize  that in the event my procedure results in extended X-Ray/fluoroscopy time, I may develop a skin reaction.    9. If I have a Do Not Attempt Resuscitation (DNAR) order in place, that status will be suspended while in the operating room, procedural suite, and during the recovery period unless otherwise explicitly stated by me (or a person authorized to consent on my behalf). The surgeon or my attending physician will determine when the applicable recovery period ends for purposes of reinstating the DNAR order.  10. Patients having a sterilization procedure: I understand that if the procedure is successful the results will be permanent and it will therefore be impossible for me to inseminate, conceive, or bear children.  I also understand that the procedure is intended to result in sterility, although the result has not been guaranteed.   11. I acknowledge that my physician has explained sedation/analgesia administration to me including the risk and benefits I consent to the administration of sedation/analgesia as may be necessary or desirable in the judgment of my physician.    I CERTIFY THAT I HAVE READ AND FULLY UNDERSTAND THE ABOVE CONSENT TO OPERATION and/or OTHER PROCEDURE.    _________________________________________  __________________________________  Signature of Patient     Signature of Responsible Person         ___________________________________         Printed Name of Responsible Person           _________________________________                 Relationship to Patient  _________________________________________  ______________________________  Signature of Witness          Date  Time      Patient Name: Godwin Fonseca     : 1980                 Printed: 2025     Medical Record #: [id number]                     Page 1 of [address] [place] [address]  [id number]    Consent for Anesthesia    I, Godwin Fonseca agree to be cared for by an  anesthesiologist, who is specially trained to monitor me and give me medicine to put me to sleep or keep me comfortable during my procedure    I understand that my anesthesiologist is not an employee or agent of [facility] or [employer]. He or she works for [employer].    As the patient asking for anesthesia services, I agree to:  Allow the anesthesiologist (anesthesia doctor) to give me medicine and do additional procedures as necessary. Some examples are: Starting or using an “IV” to give me medicine, fluids or blood during my procedure, and having a breathing tube placed to help me breathe when I’m asleep (intubation). In the event that my heart stops working properly, I understand that my anesthesiologist will make every effort to sustain my life, unless otherwise directed by [facility] Do Not Resuscitate documents.  Tell my anesthesia doctor before my procedure:  If I am pregnant.  The last time that I ate or drank.  All of the medicines I take (including prescriptions, herbal supplements, and pills I can buy without a prescription (including street drugs/illegal medications). Failure to inform my anesthesiologist about these medicines may increase my risk of anesthetic complications.  If I am allergic to anything or have had a reaction to anesthesia before.  I understand how the anesthesia medicine will help me (benefits).  I understand that with any type of anesthesia medicine there are risks:  The most common risks are: nausea, vomiting, sore throat, muscle soreness, damage to my eyes, mouth, or teeth (from breathing tube placement).  Rare risks include: remembering what happened during my procedure, allergic reactions to medications, injury to my airway, heart, lungs, vision, nerves, or muscles and in extremely rare instances death.  My doctor has explained to me other choices available to me for my care (alternatives).  Pregnant Patients (“epidural”):  I understand  that the risks of having an epidural (medicine given into my back to help control pain during labor), include itching, low blood pressure, difficulty urinating, headache or slowing of the baby’s heart. Very rare risks include infection, bleeding, seizure, irregular heart rhythms and nerve injury.  Regional Anesthesia (“spinal”, “epidural”, & “nerve blocks”):  I understand that rare but potential complications include headache, bleeding, infection, seizure, irregular heart rhythms, and nerve injury.    I can change my mind about having anesthesia services at any time before I get the medicine.    _____________________________________________________________________________  Patient (or Representative) Signature/Relationship to Patient  Date   Time    _____________________________________________________________________________   Name (if used)    Language/Organization   Time    _____________________________________________________________________________  Anesthesiologist Signature     Date   Time  I have discussed the procedure and information above with the patient (or patient’s representative) and answered their questions. The patient or their representative has agreed to have anesthesia services.    _____________________________________________________________________________  Witness        Date   Time  I have verified that the signature is that of the patient or patient’s representative, and that it was signed before the procedure  Patient Name: Godwin Fonseca     : 1980                 Printed: 2025     Medical Record #: [id number]                     Page 2 of 2

## (undated) NOTE — LETTER
[address]  [address]  Authorization for Surgical Operation and Procedure     Date:___________                                                                                                         Time:__________  I hereby authorize Surgeon(s):  Bakari Noguera MD, my physician and his/her assistants (if applicable), which may include medical students, residents, and/or fellows, to perform the following surgical operation/ procedure and administer such anesthesia as may be determined necessary by my physician:  Operation/Procedure name (s) Procedure(s):  COLONOSCOPY on Godwin Fonseca   2.   I recognize that during the surgical operation/procedure, unforeseen conditions may necessitate additional or different procedures than those listed above.  I, therefore, further authorize and request that the above-named surgeon, assistants, or designees perform such procedures as are, in their judgment, necessary and desirable.    3.   My surgeon/physician has discussed prior to my surgery the potential benefits, risks and side effects of this procedure; the likelihood of achieving goals; and potential problems that might occur during recuperation.  They also discussed reasonable alternatives to the procedure, including risks, benefits, and side effects related to the alternatives and risks related to not receiving this procedure.  I have had all my questions answered and I acknowledge that no guarantee has been made as to the result that may be obtained.    4.   Should the need arise during my operation/procedure, which includes change of level of care prior to discharge, I also consent to the administration of blood and/or blood products.  Further, I understand that despite careful testing and screening of blood or blood products by collecting agencies, I may still be subject to ill effects as a result of receiving a blood transfusion and/or blood products.  The following are some,  but not all, of the potential risks that can occur: fever and allergic reactions, hemolytic reactions, transmission of diseases such as Hepatitis, AIDS and Cytomegalovirus (CMV) and fluid overload.  In the event that I wish to have an autologous transfusion of my own blood, or a directed donor transfusion, I will discuss this with my physician.  Check only if Refusing Blood or Blood Products  I understand refusal of blood or blood products as deemed necessary by my physician may have serious consequences to my condition to include possible death. I hereby assume responsibility for my refusal and release the hospital, its personnel, and my physicians from any responsibility for the consequences of my refusal.          o  Refuse      5.   I authorize the use of any specimen, organs, tissues, body parts or foreign objects that may be removed from my body during the operation/procedure for diagnosis, research or teaching purposes and their subsequent disposal by hospital authorities.  I also authorize the release of specimen test results and/or written reports to my treating physician on the hospital medical staff or other referring or consulting physicians involved in my care, at the discretion of the Pathologist or my treating physician.    6.   I consent to the photographing or videotaping of the operations or procedures to be performed, including appropriate portions of my body for medical, scientific, or educational purposes, provided my identity is not revealed by the pictures or by descriptive texts accompanying them.  If the procedure has been photographed/videotaped, the surgeon will obtain the original picture, image, videotape or CD.  The hospital will not be responsible for storage, release or maintenance of the picture, image, tape or CD.    7.   I consent to the presence of a  or observers in the operating room as deemed necessary by my physician or their designees.    8.   I recognize  that in the event my procedure results in extended X-Ray/fluoroscopy time, I may develop a skin reaction.    9. If I have a Do Not Attempt Resuscitation (DNAR) order in place, that status will be suspended while in the operating room, procedural suite, and during the recovery period unless otherwise explicitly stated by me (or a person authorized to consent on my behalf). The surgeon or my attending physician will determine when the applicable recovery period ends for purposes of reinstating the DNAR order.  10. Patients having a sterilization procedure: I understand that if the procedure is successful the results will be permanent and it will therefore be impossible for me to inseminate, conceive, or bear children.  I also understand that the procedure is intended to result in sterility, although the result has not been guaranteed.   11. I acknowledge that my physician has explained sedation/analgesia administration to me including the risk and benefits I consent to the administration of sedation/analgesia as may be necessary or desirable in the judgment of my physician.    I CERTIFY THAT I HAVE READ AND FULLY UNDERSTAND THE ABOVE CONSENT TO OPERATION and/or OTHER PROCEDURE.    _________________________________________  __________________________________  Signature of Patient     Signature of Responsible Person         ___________________________________         Printed Name of Responsible Person           _________________________________                 Relationship to Patient  _________________________________________  ______________________________  Signature of Witness          Date  Time      Patient Name: Godwin Fonseca     : 1980                 Printed: 2025     Medical Record #: [id number]                     Page 1 of [address] [place] [address]  [address]    Consent for Anesthesia    I, Godwin Fonseca agree to be cared for by an  anesthesiologist, who is specially trained to monitor me and give me medicine to put me to sleep or keep me comfortable during my procedure    I understand that my anesthesiologist is not an employee or agent of [facility] or [employer]. He or she works for [employer].    As the patient asking for anesthesia services, I agree to:  Allow the anesthesiologist (anesthesia doctor) to give me medicine and do additional procedures as necessary. Some examples are: Starting or using an “IV” to give me medicine, fluids or blood during my procedure, and having a breathing tube placed to help me breathe when I’m asleep (intubation). In the event that my heart stops working properly, I understand that my anesthesiologist will make every effort to sustain my life, unless otherwise directed by [facility] Do Not Resuscitate documents.  Tell my anesthesia doctor before my procedure:  If I am pregnant.  The last time that I ate or drank.  All of the medicines I take (including prescriptions, herbal supplements, and pills I can buy without a prescription (including street drugs/illegal medications). Failure to inform my anesthesiologist about these medicines may increase my risk of anesthetic complications.  If I am allergic to anything or have had a reaction to anesthesia before.  I understand how the anesthesia medicine will help me (benefits).  I understand that with any type of anesthesia medicine there are risks:  The most common risks are: nausea, vomiting, sore throat, muscle soreness, damage to my eyes, mouth, or teeth (from breathing tube placement).  Rare risks include: remembering what happened during my procedure, allergic reactions to medications, injury to my airway, heart, lungs, vision, nerves, or muscles and in extremely rare instances death.  My doctor has explained to me other choices available to me for my care (alternatives).  Pregnant Patients (“epidural”):  I understand  that the risks of having an epidural (medicine given into my back to help control pain during labor), include itching, low blood pressure, difficulty urinating, headache or slowing of the baby’s heart. Very rare risks include infection, bleeding, seizure, irregular heart rhythms and nerve injury.  Regional Anesthesia (“spinal”, “epidural”, & “nerve blocks”):  I understand that rare but potential complications include headache, bleeding, infection, seizure, irregular heart rhythms, and nerve injury.    I can change my mind about having anesthesia services at any time before I get the medicine.    _____________________________________________________________________________  Patient (or Representative) Signature/Relationship to Patient  Date   Time    _____________________________________________________________________________   Name (if used)    Language/Organization   Time    _____________________________________________________________________________  Anesthesiologist Signature     Date   Time  I have discussed the procedure and information above with the patient (or patient’s representative) and answered their questions. The patient or their representative has agreed to have anesthesia services.    _____________________________________________________________________________  Witness        Date   Time  I have verified that the signature is that of the patient or patient’s representative, and that it was signed before the procedure  Patient Name: Godwin Fonseca     : 1980                 Printed: 2025     Medical Record #: [id number]                     Page 2 of 2

## (undated) NOTE — LETTER
Date & Time: 3/7/2022, 9:11 PM  Patient: Cuca Merritt  Encounter Provider(s):    Arlene Sepulveda MD         This certifies that Kayla Wylie, a patient at an [facility], am leaving the facility voluntarily and against the advice of my physician. I acknowledge that I have been:    1. informed that my physician believes that I need to receive care here;  2. informed that if I leave, I could become sicker or even die; and  3. provided discharge instructions consistent with my current diagnosis. I hereby release my physician, the facility, and its employees from all responsibility for any ill effects which may result from this action. __________________________________  Patient or authorized caregiver signature    __________________________________  RN signature    If no patient or patient representative signature was obtained, sign below to acknowledge that the form was reviewed with the patient and that the patient refused to sign.     __________________________________  RN signature

## (undated) NOTE — LETTER
[address]  [address]  Consent for Procedure/Sedation  Date: 4/21/25         Time: 1200    I hereby authorize Dr Bernal, my physician and his/her assistants (if applicable), which may include medical students, residents, and/or fellows, to perform the following surgical operation/ procedure and administer such anesthesia as may be determined necessary by my physician: Fistulogram with Possible Angioplasty and Permanent Dialysis Catheter Inseriton on Godwin Fonseca  2.   I recognize that during the surgical operation/procedure, unforeseen conditions may necessitate additional or different procedures than those listed above.  I, therefore, further authorize and request that the above-named surgeon, assistants, or designees perform such procedures as are, in their judgment, necessary and desirable.    3.   My surgeon/physician has discussed prior to my surgery the potential benefits, risks and side effects of this procedure; the likelihood of achieving goals; and potential problems that might occur during recuperation.  They also discussed reasonable alternatives to the procedure, including risks, benefits, and side effects related to the alternatives and risks related to not receiving this procedure.  I have had all my questions answered and I acknowledge that no guarantee has been made as to the result that may be obtained.    4.   Should the need arise during my operation/procedure, which includes change of level of care prior to discharge, I also consent to the administration of blood and/or blood products.  Further, I understand that despite careful testing and screening of blood or blood products by collecting agencies, I may still be subject to ill effects as a result of receiving a blood transfusion and/or blood products.  The following are some, but not all, of the potential risks that can occur: fever and allergic reactions, hemolytic reactions, transmission of diseases  such as Hepatitis, AIDS and Cytomegalovirus (CMV) and fluid overload.  In the event that I wish to have an autologous transfusion of my own blood, or a directed donor transfusion, I will discuss this with my physician.   Check only if Refusing Blood or Blood Products  I understand refusal of blood or blood products as deemed necessary by my physician may have serious consequences to my condition to include possible death. I hereby assume responsibility for my refusal and release the hospital, its personnel, and my physicians from any responsibility for the consequences of my refusal.         o  Refuse         5.   I authorize the use of any specimen, organs, tissues, body parts or foreign objects that may be removed from my body during the operation/procedure for diagnosis, research or teaching purposes and their subsequent disposal by hospital authorities.  I also authorize the release of specimen test results and/or written reports to my treating physician on the hospital medical staff or other referring or consulting physicians involved in my care, at the discretion of the Pathologist or my treating physician.    6.   I consent to the photographing or videotaping of the operations or procedures to be performed, including appropriate portions of my body for medical, scientific, or educational purposes, provided my identity is not revealed by the pictures or by descriptive texts accompanying them.  If the procedure has been photographed/videotaped, the surgeon will obtain the original picture, image, videotape or CD.  The hospital will not be responsible for storage, release or maintenance of the picture, image, tape or CD.    7.   I consent to the presence of a  or observers in the operating room as deemed necessary by my physician or their designees.    8.   I recognize that in the event my procedure results in extended X-Ray/fluoroscopy time, I may develop a skin reaction.    9. If I have a Do  Not Attempt Resuscitation (DNAR) order in place, that status will be suspended while in the operating room, procedural suite, and during the recovery period unless otherwise explicitly stated by me (or a person authorized to consent on my behalf). The surgeon or my attending physician will determine when the applicable recovery period ends for purposes of reinstating the DNAR order.  10. Patients having a sterilization procedure: I understand that if the procedure is successful the results will be permanent and it will therefore be impossible for me to inseminate, conceive, or bear children.  I also understand that the procedure is intended to result in sterility, although the result has not been guaranteed.   11. I acknowledge that my physician has explained sedation/analgesia administration to me including the risk and benefits I consent to the administration of sedation/analgesia as may be necessary or desirable in the judgment of my physician.    I CERTIFY THAT I HAVE READ AND FULLY UNDERSTAND THE ABOVE CONSENT TO OPERATION and/or OTHER PROCEDURE.        ____________________________________       _________________________________      ______________________________  Signature of Patient         Signature of Responsible Person        Printed Name of Responsible Person        ____________________________________      _________________________________      ______________________________       Signature of Witness          Relationship to Patient                       Date                                       Time  Patient Name: Godwin Fonseca  : 1980    Reviewed: 2024   Printed: 2025  Medical Record #: [id number] Page 1 of 1

## (undated) NOTE — LETTER
[address]  [address]  Authorization for Surgical Operation and Procedure     Date:___________                                                                                                         Time:__________  I hereby authorize Surgeon(s):  Bakari Noguera MD, my physician and his/her assistants (if applicable), which may include medical students, residents, and/or fellows, to perform the following surgical operation/ procedure and administer such anesthesia as may be determined necessary by my physician:  Operation/Procedure name (s) Procedure(s):  COLONOSCOPY with possible biopsy,,polyp removal, control of bleeding on Godwinlawanda Fonseca   2.   I recognize that during the surgical operation/procedure, unforeseen conditions may necessitate additional or different procedures than those listed above.  I, therefore, further authorize and request that the above-named surgeon, assistants, or designees perform such procedures as are, in their judgment, necessary and desirable.    3.   My surgeon/physician has discussed prior to my surgery the potential benefits, risks and side effects of this procedure; the likelihood of achieving goals; and potential problems that might occur during recuperation.  They also discussed reasonable alternatives to the procedure, including risks, benefits, and side effects related to the alternatives and risks related to not receiving this procedure.  I have had all my questions answered and I acknowledge that no guarantee has been made as to the result that may be obtained.    4.   Should the need arise during my operation/procedure, which includes change of level of care prior to discharge, I also consent to the administration of blood and/or blood products.  Further, I understand that despite careful testing and screening of blood or blood products by collecting agencies, I may still be subject to ill effects as a result of receiving a blood  transfusion and/or blood products.  The following are some, but not all, of the potential risks that can occur: fever and allergic reactions, hemolytic reactions, transmission of diseases such as Hepatitis, AIDS and Cytomegalovirus (CMV) and fluid overload.  In the event that I wish to have an autologous transfusion of my own blood, or a directed donor transfusion, I will discuss this with my physician.  Check only if Refusing Blood or Blood Products  I understand refusal of blood or blood products as deemed necessary by my physician may have serious consequences to my condition to include possible death. I hereby assume responsibility for my refusal and release the hospital, its personnel, and my physicians from any responsibility for the consequences of my refusal.          o  Refuse      5.   I authorize the use of any specimen, organs, tissues, body parts or foreign objects that may be removed from my body during the operation/procedure for diagnosis, research or teaching purposes and their subsequent disposal by hospital authorities.  I also authorize the release of specimen test results and/or written reports to my treating physician on the hospital medical staff or other referring or consulting physicians involved in my care, at the discretion of the Pathologist or my treating physician.    6.   I consent to the photographing or videotaping of the operations or procedures to be performed, including appropriate portions of my body for medical, scientific, or educational purposes, provided my identity is not revealed by the pictures or by descriptive texts accompanying them.  If the procedure has been photographed/videotaped, the surgeon will obtain the original picture, image, videotape or CD.  The hospital will not be responsible for storage, release or maintenance of the picture, image, tape or CD.    7.   I consent to the presence of a  or observers in the operating room as deemed  necessary by my physician or their designees.    8.   I recognize that in the event my procedure results in extended X-Ray/fluoroscopy time, I may develop a skin reaction.    9. If I have a Do Not Attempt Resuscitation (DNAR) order in place, that status will be suspended while in the operating room, procedural suite, and during the recovery period unless otherwise explicitly stated by me (or a person authorized to consent on my behalf). The surgeon or my attending physician will determine when the applicable recovery period ends for purposes of reinstating the DNAR order.  10. Patients having a sterilization procedure: I understand that if the procedure is successful the results will be permanent and it will therefore be impossible for me to inseminate, conceive, or bear children.  I also understand that the procedure is intended to result in sterility, although the result has not been guaranteed.   11. I acknowledge that my physician has explained sedation/analgesia administration to me including the risk and benefits I consent to the administration of sedation/analgesia as may be necessary or desirable in the judgment of my physician.    I CERTIFY THAT I HAVE READ AND FULLY UNDERSTAND THE ABOVE CONSENT TO OPERATION and/or OTHER PROCEDURE.    _________________________________________  __________________________________  Signature of Patient     Signature of Responsible Person         ___________________________________         Printed Name of Responsible Person           _________________________________                 Relationship to Patient  _________________________________________  ______________________________  Signature of Witness          Date  Time      Patient Name: Godwin Fonseca     : 1980                 Printed: 2025     Medical Record #: [id number]                     Page 1 of 2                                    [address]  [address]    Consent  for Anesthesia    I, Godwin Fonseca agree to be cared for by an anesthesiologist, who is specially trained to monitor me and give me medicine to put me to sleep or keep me comfortable during my procedure    I understand that my anesthesiologist is not an employee or agent of [facility] [employer]. He or she works for [employer].    As the patient asking for anesthesia services, I agree to:  Allow the anesthesiologist (anesthesia doctor) to give me medicine and do additional procedures as necessary. Some examples are: Starting or using an “IV” to give me medicine, fluids or blood during my procedure, and having a breathing tube placed to help me breathe when I’m asleep (intubation). In the event that my heart stops working properly, I understand that my anesthesiologist will make every effort to sustain my life, unless otherwise directed by [facility] Do Not Resuscitate documents.  Tell my anesthesia doctor before my procedure:  If I am pregnant.  The last time that I ate or drank.  All of the medicines I take (including prescriptions, herbal supplements, and pills I can buy without a prescription (including street drugs/illegal medications). Failure to inform my anesthesiologist about these medicines may increase my risk of anesthetic complications.  If I am allergic to anything or have had a reaction to anesthesia before.  I understand how the anesthesia medicine will help me (benefits).  I understand that with any type of anesthesia medicine there are risks:  The most common risks are: nausea, vomiting, sore throat, muscle soreness, damage to my eyes, mouth, or teeth (from breathing tube placement).  Rare risks include: remembering what happened during my procedure, allergic reactions to medications, injury to my airway, heart, lungs, vision, nerves, or muscles and in extremely rare instances death.  My doctor has explained to me other choices available to me for my care  (alternatives).  Pregnant Patients (“epidural”):  I understand that the risks of having an epidural (medicine given into my back to help control pain during labor), include itching, low blood pressure, difficulty urinating, headache or slowing of the baby’s heart. Very rare risks include infection, bleeding, seizure, irregular heart rhythms and nerve injury.  Regional Anesthesia (“spinal”, “epidural”, & “nerve blocks”):  I understand that rare but potential complications include headache, bleeding, infection, seizure, irregular heart rhythms, and nerve injury.    I can change my mind about having anesthesia services at any time before I get the medicine.    _____________________________________________________________________________  Patient (or Representative) Signature/Relationship to Patient  Date   Time    _____________________________________________________________________________   Name (if used)    Language/Organization   Time    _____________________________________________________________________________  Anesthesiologist Signature     Date   Time  I have discussed the procedure and information above with the patient (or patient’s representative) and answered their questions. The patient or their representative has agreed to have anesthesia services.    _____________________________________________________________________________  Witness        Date   Time  I have verified that the signature is that of the patient or patient’s representative, and that it was signed before the procedure  Patient Name: Godwin Fonseca     : 1980                 Printed: 2025     Medical Record #: [id number]                     Page 2 of 2

## (undated) NOTE — LETTER
[facility]  Phone: [phone]  Right Fax: [phone]  [facility] By:  Eliza Wilcox RN Date: 18    Patient Name: Rachael Keon  Surgery Date: 2018    CSN: [id number]  Medical Record: [id number]   :

## (undated) NOTE — ED AVS SNAPSHOT
Teresa Shabazz   MRN: [id number]    Department:  [address] [facility] in [place]   Date of Visit:  1/4/2018           Disclosure     Insurance plans vary and the physician(s) referred by the ER may not be covered by your plan.  Please contact y tell this physician (or your personal doctor if your instructions are to return to your personal doctor) about any new or lasting problems. The primary care or specialist physician will see patients referred from the [facility] Emergency Department.  Tonny Marley

## (undated) NOTE — LETTER
[facility]  Phone: [phone]  Right Fax: [phone]  [address] [place] [address] By:  Mary Felix RN Date: 8/31/18    Patient Name: [facility]  Surgery Date: 9/6/2018    CSN: [id number]  Medical Record: [id number] Rev 2/12/14

## (undated) NOTE — LETTER
[facility] [address], [address] [place] [address]  Consent for Procedure/Sedation  Date: 4/6/2023         Time: 1800    1. I hereby authorize Dr. Neymar Escoto, my physician and his/her assistants (if applicable), which may include medical students, residents, and/or fellows, to perform the following surgical operation/ procedure and administer such anesthesia as may be determined necessary by my physician:  Operation/Procedure name (s)  Cardiac Catheterization, Left Ventricular Cineangiography, Bilateral Selective Coronary Angiography and/or Right Heart Catheterization; possible Percutaneous Transluminal Coronary Angioplasty, Coronary Atherectomy, Coronary Stent, Intracoronary Thrombolytic therapy, Antiplatelet therapy and/or Intravascular Ultrasound on [facility]   2. I recognize that during the surgical operation/procedure, unforeseen conditions may necessitate additional or different procedures than those listed above. I, therefore, further authorize and request that the above-named surgeon, assistants, or designees perform such procedures as are, in their judgment, necessary and desirable. 3.   My surgeon/physician has discussed prior to my surgery the potential benefits, risks and side effects of this procedure; the likelihood of achieving goals; and potential problems that might occur during recuperation. They also discussed reasonable alternatives to the procedure, including risks, benefits, and side effects related to the alternatives and risks related to not receiving this procedure. I have had all my questions answered and I acknowledge that no guarantee has been made as to the result that may be obtained. 4.   Should the need arise during my operation/procedure, which includes change of level of care prior to discharge, I also consent to the administration of blood and/or blood products.   Further, I understand that despite careful testing and screening of blood or blood products by collecting agencies, I may still be subject to ill effects as a result of receiving a blood transfusion and/or blood products. The following are some, but not all, of the potential risks that can occur: fever and allergic reactions, hemolytic reactions, transmission of diseases such as Hepatitis, AIDS and Cytomegalovirus (CMV) and fluid overload. In the event that I wish to have an autologous transfusion of my own blood, or a directed donor transfusion, I will discuss this with my physician. Check only if Refusing Blood or Blood Products  I understand refusal of blood or blood products as deemed necessary by my physician may have serious consequences to my condition to include possible death. I hereby assume responsibility for my refusal and release the hospital, its personnel, and my physicians from any responsibility for the consequences of my refusal.          o  Refuse      5. I authorize the use of any specimen, organs, tissues, body parts or foreign objects that may be removed from my body during the operation/procedure for diagnosis, research or teaching purposes and their subsequent disposal by hospital authorities. I also authorize the release of specimen test results and/or written reports to my treating physician on the hospital medical staff or other referring or consulting physicians involved in my care, at the discretion of the Pathologist or my treating physician. 6.   I consent to the photographing or videotaping of the operations or procedures to be performed, including appropriate portions of my body for medical, scientific, or educational purposes, provided my identity is not revealed by the pictures or by descriptive texts accompanying them. If the procedure has been photographed/videotaped, the surgeon will obtain the original picture, image, videotape or CD.   The hospital will not be responsible for storage, release or maintenance of the picture, image, tape or CD.    7.   I consent to the presence of a  or observers in the operating room as deemed necessary by my physician or their designees. 8.   I recognize that in the event my procedure results in extended X-Ray/fluoroscopy time, I may develop a skin reaction. 9. If I have a Do Not Attempt Resuscitation (DNAR) order in place, that status will be suspended while in the operating room, procedural suite, and during the recovery period unless otherwise explicitly stated by me (or a person authorized to consent on my behalf). The surgeon or my attending physician will determine when the applicable recovery period ends for purposes of reinstating the DNAR order. 10. Patients having a sterilization procedure: I understand that if the procedure is successful the results will be permanent and it will therefore be impossible for me to inseminate, conceive, or bear children. I also understand that the procedure is intended to result in sterility, although the result has not been guaranteed. 11. I acknowledge that my physician has explained sedation/analgesia administration to me including the risk and benefits I consent to the administration of sedation/analgesia as may be necessary or desirable in the judgment of my physician.     I CERTIFY THAT I HAVE READ AND FULLY UNDERSTAND THE ABOVE CONSENT TO OPERATION and/or OTHER PROCEDURE.        ____________________________________       _________________________________      ______________________________  Signature of Patient         Signature of Responsible Person        Printed Name of Responsible Person    ____________________________________      _________________________________      ______________________________       Signature of Witness          Relationship to Patient                       Date                                       Time  Patient Name: Nelly Guadalupe     : 1980                 Printed: 2023      Medical Record #: [id number] Page 1 of 2